# Patient Record
Sex: FEMALE | Race: WHITE | NOT HISPANIC OR LATINO | Employment: OTHER | URBAN - NONMETROPOLITAN AREA
[De-identification: names, ages, dates, MRNs, and addresses within clinical notes are randomized per-mention and may not be internally consistent; named-entity substitution may affect disease eponyms.]

---

## 2017-04-12 ENCOUNTER — HOSPITAL ENCOUNTER (INPATIENT)
Facility: HOSPITAL | Age: 57
LOS: 8 days | Discharge: HOME HEALTH | End: 2017-04-20
Attending: EMERGENCY MEDICINE | Admitting: FAMILY MEDICINE

## 2017-04-12 ENCOUNTER — APPOINTMENT (OUTPATIENT)
Dept: GENERAL RADIOLOGY | Facility: HOSPITAL | Age: 57
End: 2017-04-12

## 2017-04-12 DIAGNOSIS — L03.116 CELLULITIS OF FOOT, LEFT: Primary | ICD-10-CM

## 2017-04-12 LAB
ALBUMIN SERPL-MCNC: 4 G/DL (ref 3.5–5)
ALBUMIN/GLOB SERPL: 1.3 G/DL (ref 1.5–2.5)
ALP SERPL-CCNC: 92 U/L (ref 35–104)
ALT SERPL W P-5'-P-CCNC: 22 U/L (ref 10–36)
ANION GAP SERPL CALCULATED.3IONS-SCNC: 4.1 MMOL/L (ref 3.6–11.2)
APTT PPP: 30.8 SECONDS (ref 24.4–31)
AST SERPL-CCNC: 20 U/L (ref 10–30)
BASOPHILS # BLD AUTO: 0.02 10*3/MM3 (ref 0–0.3)
BASOPHILS NFR BLD AUTO: 0.2 % (ref 0–2)
BILIRUB SERPL-MCNC: 0.6 MG/DL (ref 0.2–1.8)
BILIRUB UR QL STRIP: NEGATIVE
BUN BLD-MCNC: 20 MG/DL (ref 7–21)
BUN/CREAT SERPL: 11.2 (ref 7–25)
CALCIUM SPEC-SCNC: 8.8 MG/DL (ref 7.7–10)
CHLORIDE SERPL-SCNC: 99 MMOL/L (ref 99–112)
CLARITY UR: CLEAR
CO2 SERPL-SCNC: 29.9 MMOL/L (ref 24.3–31.9)
COLOR UR: YELLOW
CREAT BLD-MCNC: 1.78 MG/DL (ref 0.43–1.29)
D-LACTATE SERPL-SCNC: 0.8 MMOL/L (ref 0.5–2)
DEPRECATED RDW RBC AUTO: 44.9 FL (ref 37–54)
EOSINOPHIL # BLD AUTO: 0.07 10*3/MM3 (ref 0–0.7)
EOSINOPHIL NFR BLD AUTO: 0.9 % (ref 0–5)
ERYTHROCYTE [DISTWIDTH] IN BLOOD BY AUTOMATED COUNT: 13.3 % (ref 11.5–14.5)
GFR SERPL CREATININE-BSD FRML MDRD: 29 ML/MIN/1.73
GLOBULIN UR ELPH-MCNC: 3 GM/DL
GLUCOSE BLD-MCNC: 406 MG/DL (ref 70–110)
GLUCOSE BLDC GLUCOMTR-MCNC: 393 MG/DL (ref 70–130)
GLUCOSE UR STRIP-MCNC: ABNORMAL MG/DL
HCT VFR BLD AUTO: 32.6 % (ref 37–47)
HGB BLD-MCNC: 10.5 G/DL (ref 12–16)
HGB UR QL STRIP.AUTO: NEGATIVE
IMM GRANULOCYTES # BLD: 0.03 10*3/MM3 (ref 0–0.03)
IMM GRANULOCYTES NFR BLD: 0.4 % (ref 0–0.5)
INR PPP: 0.91 (ref 0.8–1.1)
KETONES UR QL STRIP: NEGATIVE
LEUKOCYTE ESTERASE UR QL STRIP.AUTO: NEGATIVE
LYMPHOCYTES # BLD AUTO: 1.43 10*3/MM3 (ref 1–3)
LYMPHOCYTES NFR BLD AUTO: 17.4 % (ref 21–51)
MCH RBC QN AUTO: 29.7 PG (ref 27–33)
MCHC RBC AUTO-ENTMCNC: 32.2 G/DL (ref 33–37)
MCV RBC AUTO: 92.4 FL (ref 80–94)
MONOCYTES # BLD AUTO: 0.83 10*3/MM3 (ref 0.1–0.9)
MONOCYTES NFR BLD AUTO: 10.1 % (ref 0–10)
NEUTROPHILS # BLD AUTO: 5.84 10*3/MM3 (ref 1.4–6.5)
NEUTROPHILS NFR BLD AUTO: 71 % (ref 30–70)
NITRITE UR QL STRIP: NEGATIVE
OSMOLALITY SERPL CALC.SUM OF ELEC: 286.1 MOSM/KG (ref 273–305)
PH UR STRIP.AUTO: 5.5 [PH] (ref 5–8)
PLATELET # BLD AUTO: 126 10*3/MM3 (ref 130–400)
PMV BLD AUTO: 11.1 FL (ref 6–10)
POTASSIUM BLD-SCNC: 4 MMOL/L (ref 3.5–5.3)
PROT SERPL-MCNC: 7 G/DL (ref 6–8)
PROT UR QL STRIP: NEGATIVE
PROTHROMBIN TIME: 10 SECONDS (ref 9.8–11.9)
RBC # BLD AUTO: 3.53 10*6/MM3 (ref 4.2–5.4)
SODIUM BLD-SCNC: 133 MMOL/L (ref 135–153)
SP GR UR STRIP: 1.01 (ref 1–1.03)
UROBILINOGEN UR QL STRIP: ABNORMAL
WBC NRBC COR # BLD: 8.22 10*3/MM3 (ref 4.5–12.5)

## 2017-04-12 PROCEDURE — 87070 CULTURE OTHR SPECIMN AEROBIC: CPT | Performed by: EMERGENCY MEDICINE

## 2017-04-12 PROCEDURE — 87077 CULTURE AEROBIC IDENTIFY: CPT | Performed by: EMERGENCY MEDICINE

## 2017-04-12 PROCEDURE — 87040 BLOOD CULTURE FOR BACTERIA: CPT | Performed by: EMERGENCY MEDICINE

## 2017-04-12 PROCEDURE — 85610 PROTHROMBIN TIME: CPT | Performed by: EMERGENCY MEDICINE

## 2017-04-12 PROCEDURE — 25010000002 VANCOMYCIN PER 500 MG: Performed by: EMERGENCY MEDICINE

## 2017-04-12 PROCEDURE — 73620 X-RAY EXAM OF FOOT: CPT

## 2017-04-12 PROCEDURE — 85025 COMPLETE CBC W/AUTO DIFF WBC: CPT | Performed by: EMERGENCY MEDICINE

## 2017-04-12 PROCEDURE — 83605 ASSAY OF LACTIC ACID: CPT | Performed by: EMERGENCY MEDICINE

## 2017-04-12 PROCEDURE — 99284 EMERGENCY DEPT VISIT MOD MDM: CPT

## 2017-04-12 PROCEDURE — 87205 SMEAR GRAM STAIN: CPT | Performed by: EMERGENCY MEDICINE

## 2017-04-12 PROCEDURE — 36415 COLL VENOUS BLD VENIPUNCTURE: CPT

## 2017-04-12 PROCEDURE — 63710000001 INSULIN REGULAR HUMAN PER 5 UNITS: Performed by: EMERGENCY MEDICINE

## 2017-04-12 PROCEDURE — 81003 URINALYSIS AUTO W/O SCOPE: CPT | Performed by: EMERGENCY MEDICINE

## 2017-04-12 PROCEDURE — 85730 THROMBOPLASTIN TIME PARTIAL: CPT | Performed by: EMERGENCY MEDICINE

## 2017-04-12 PROCEDURE — 80053 COMPREHEN METABOLIC PANEL: CPT | Performed by: EMERGENCY MEDICINE

## 2017-04-12 PROCEDURE — 87186 SC STD MICRODIL/AGAR DIL: CPT | Performed by: EMERGENCY MEDICINE

## 2017-04-12 PROCEDURE — 82962 GLUCOSE BLOOD TEST: CPT

## 2017-04-12 PROCEDURE — 87147 CULTURE TYPE IMMUNOLOGIC: CPT | Performed by: EMERGENCY MEDICINE

## 2017-04-12 PROCEDURE — 73630 X-RAY EXAM OF FOOT: CPT | Performed by: RADIOLOGY

## 2017-04-12 RX ORDER — LIDOCAINE HYDROCHLORIDE 10 MG/ML
10 INJECTION, SOLUTION EPIDURAL; INFILTRATION; INTRACAUDAL; PERINEURAL ONCE
Status: DISCONTINUED | OUTPATIENT
Start: 2017-04-12 | End: 2017-04-20 | Stop reason: HOSPADM

## 2017-04-12 RX ORDER — HYDROXYZINE HYDROCHLORIDE 10 MG/1
10 TABLET, FILM COATED ORAL DAILY
Status: ON HOLD | COMMUNITY
End: 2018-11-23

## 2017-04-12 RX ORDER — MELOXICAM 7.5 MG/1
7.5 TABLET ORAL DAILY
Status: ON HOLD | COMMUNITY
End: 2017-04-13

## 2017-04-12 RX ORDER — LIDOCAINE HYDROCHLORIDE 10 MG/ML
INJECTION, SOLUTION INFILTRATION; PERINEURAL
Status: DISPENSED
Start: 2017-04-12 | End: 2017-04-13

## 2017-04-12 RX ORDER — CLINDAMYCIN PHOSPHATE 900 MG/50ML
900 INJECTION INTRAVENOUS ONCE
Status: COMPLETED | OUTPATIENT
Start: 2017-04-12 | End: 2017-04-12

## 2017-04-12 RX ORDER — TIZANIDINE 4 MG/1
4 TABLET ORAL EVERY 6 HOURS PRN
COMMUNITY
End: 2018-11-25 | Stop reason: HOSPADM

## 2017-04-12 RX ORDER — CETIRIZINE HYDROCHLORIDE 5 MG/1
5 TABLET ORAL DAILY
Status: ON HOLD | COMMUNITY
End: 2017-04-13

## 2017-04-12 RX ORDER — FLUOXETINE HYDROCHLORIDE 20 MG/1
20 CAPSULE ORAL DAILY
COMMUNITY
End: 2018-11-25 | Stop reason: HOSPADM

## 2017-04-12 RX ORDER — TEMAZEPAM 30 MG/1
30 CAPSULE ORAL NIGHTLY
Status: ON HOLD | COMMUNITY
End: 2021-11-05

## 2017-04-12 RX ORDER — INSULIN GLARGINE 100 [IU]/ML
22 INJECTION, SOLUTION SUBCUTANEOUS NIGHTLY
Status: ON HOLD | COMMUNITY
End: 2017-05-25

## 2017-04-12 RX ORDER — ATENOLOL 50 MG/1
40 TABLET ORAL DAILY
Status: ON HOLD | COMMUNITY
End: 2017-04-13

## 2017-04-12 RX ADMIN — CLINDAMYCIN PHOSPHATE 900 MG: 18 INJECTION, SOLUTION INTRAVENOUS at 21:05

## 2017-04-12 RX ADMIN — SODIUM CHLORIDE 1000 ML: 9 INJECTION, SOLUTION INTRAVENOUS at 21:05

## 2017-04-12 RX ADMIN — VANCOMYCIN HYDROCHLORIDE 1000 MG: 5 INJECTION, POWDER, LYOPHILIZED, FOR SOLUTION INTRAVENOUS at 21:27

## 2017-04-12 RX ADMIN — HUMAN INSULIN 12 UNITS: 100 INJECTION, SOLUTION SUBCUTANEOUS at 22:18

## 2017-04-13 LAB
GLUCOSE BLDC GLUCOMTR-MCNC: 233 MG/DL (ref 70–130)
GLUCOSE BLDC GLUCOMTR-MCNC: 298 MG/DL (ref 70–130)
GLUCOSE BLDC GLUCOMTR-MCNC: 310 MG/DL (ref 70–130)

## 2017-04-13 PROCEDURE — 25010000002 VANCOMYCIN PER 500 MG: Performed by: FAMILY MEDICINE

## 2017-04-13 PROCEDURE — 25010000002 MORPHINE PER 10 MG: Performed by: FAMILY MEDICINE

## 2017-04-13 PROCEDURE — 82962 GLUCOSE BLOOD TEST: CPT

## 2017-04-13 PROCEDURE — 25010000002 CEFEPIME: Performed by: FAMILY MEDICINE

## 2017-04-13 PROCEDURE — 63710000001 INSULIN ASPART PER 5 UNITS: Performed by: FAMILY MEDICINE

## 2017-04-13 PROCEDURE — 63710000001 INSULIN DETEMIR PER 5 UNITS: Performed by: FAMILY MEDICINE

## 2017-04-13 RX ORDER — GABAPENTIN 400 MG/1
800 CAPSULE ORAL 3 TIMES DAILY
Status: CANCELLED | OUTPATIENT
Start: 2017-04-13

## 2017-04-13 RX ORDER — CETIRIZINE HYDROCHLORIDE 10 MG/1
10 TABLET ORAL DAILY
Status: CANCELLED | OUTPATIENT
Start: 2017-04-13

## 2017-04-13 RX ORDER — GABAPENTIN 800 MG/1
800 TABLET ORAL 3 TIMES DAILY
Status: ON HOLD | COMMUNITY
End: 2018-11-25 | Stop reason: SDUPTHER

## 2017-04-13 RX ORDER — HYDROXYZINE HYDROCHLORIDE 10 MG/1
10 TABLET, FILM COATED ORAL DAILY
Status: CANCELLED | OUTPATIENT
Start: 2017-04-13

## 2017-04-13 RX ORDER — HYDROCODONE BITARTRATE AND ACETAMINOPHEN 10; 325 MG/1; MG/1
1 TABLET ORAL EVERY 8 HOURS SCHEDULED
Status: DISCONTINUED | OUTPATIENT
Start: 2017-04-13 | End: 2017-04-20 | Stop reason: HOSPADM

## 2017-04-13 RX ORDER — HYDROCODONE BITARTRATE AND ACETAMINOPHEN 10; 325 MG/1; MG/1
1 TABLET ORAL 3 TIMES DAILY
Status: CANCELLED | OUTPATIENT
Start: 2017-04-13

## 2017-04-13 RX ORDER — FLUOXETINE HYDROCHLORIDE 20 MG/1
20 CAPSULE ORAL DAILY
Status: DISCONTINUED | OUTPATIENT
Start: 2017-04-13 | End: 2017-04-20 | Stop reason: HOSPADM

## 2017-04-13 RX ORDER — CETIRIZINE HYDROCHLORIDE 10 MG/1
10 TABLET ORAL DAILY
Status: ON HOLD | COMMUNITY
End: 2018-11-23

## 2017-04-13 RX ORDER — TIZANIDINE 4 MG/1
4 TABLET ORAL EVERY 6 HOURS SCHEDULED
Status: DISCONTINUED | OUTPATIENT
Start: 2017-04-13 | End: 2017-04-20 | Stop reason: HOSPADM

## 2017-04-13 RX ORDER — GABAPENTIN 400 MG/1
800 CAPSULE ORAL EVERY 8 HOURS SCHEDULED
Status: DISCONTINUED | OUTPATIENT
Start: 2017-04-13 | End: 2017-04-20 | Stop reason: HOSPADM

## 2017-04-13 RX ORDER — HYDROCODONE BITARTRATE AND ACETAMINOPHEN 10; 325 MG/1; MG/1
1 TABLET ORAL EVERY 6 HOURS PRN
Status: ON HOLD | COMMUNITY
End: 2018-11-25 | Stop reason: SDUPTHER

## 2017-04-13 RX ORDER — ATENOLOL 25 MG/1
50 TABLET ORAL 2 TIMES DAILY
Status: ON HOLD | COMMUNITY
End: 2017-05-25

## 2017-04-13 RX ORDER — NICOTINE POLACRILEX 4 MG
15 LOZENGE BUCCAL
Status: DISCONTINUED | OUTPATIENT
Start: 2017-04-13 | End: 2017-04-20 | Stop reason: HOSPADM

## 2017-04-13 RX ORDER — ATENOLOL 25 MG/1
25 TABLET ORAL DAILY
Status: CANCELLED | OUTPATIENT
Start: 2017-04-13

## 2017-04-13 RX ORDER — SODIUM CHLORIDE 9 MG/ML
75 INJECTION, SOLUTION INTRAVENOUS CONTINUOUS
Status: DISCONTINUED | OUTPATIENT
Start: 2017-04-13 | End: 2017-04-20 | Stop reason: HOSPADM

## 2017-04-13 RX ORDER — MORPHINE SULFATE 2 MG/ML
2 INJECTION, SOLUTION INTRAMUSCULAR; INTRAVENOUS
Status: DISCONTINUED | OUTPATIENT
Start: 2017-04-13 | End: 2017-04-20 | Stop reason: HOSPADM

## 2017-04-13 RX ORDER — TIZANIDINE 4 MG/1
4 TABLET ORAL 4 TIMES DAILY
Status: CANCELLED | OUTPATIENT
Start: 2017-04-13

## 2017-04-13 RX ORDER — ACETAMINOPHEN 325 MG/1
500 TABLET ORAL EVERY 4 HOURS PRN
Status: DISCONTINUED | OUTPATIENT
Start: 2017-04-13 | End: 2017-04-20 | Stop reason: HOSPADM

## 2017-04-13 RX ORDER — DEXTROSE MONOHYDRATE 25 G/50ML
25 INJECTION, SOLUTION INTRAVENOUS
Status: DISCONTINUED | OUTPATIENT
Start: 2017-04-13 | End: 2017-04-20 | Stop reason: HOSPADM

## 2017-04-13 RX ORDER — TEMAZEPAM 15 MG/1
30 CAPSULE ORAL NIGHTLY PRN
Status: DISCONTINUED | OUTPATIENT
Start: 2017-04-13 | End: 2017-04-20 | Stop reason: HOSPADM

## 2017-04-13 RX ORDER — CETIRIZINE HYDROCHLORIDE 10 MG/1
10 TABLET ORAL DAILY
Status: DISCONTINUED | OUTPATIENT
Start: 2017-04-13 | End: 2017-04-20 | Stop reason: HOSPADM

## 2017-04-13 RX ORDER — ATENOLOL 25 MG/1
25 TABLET ORAL DAILY
Status: DISCONTINUED | OUTPATIENT
Start: 2017-04-13 | End: 2017-04-20 | Stop reason: HOSPADM

## 2017-04-13 RX ORDER — FLUOXETINE HYDROCHLORIDE 20 MG/1
20 CAPSULE ORAL DAILY
Status: CANCELLED | OUTPATIENT
Start: 2017-04-13

## 2017-04-13 RX ORDER — HYDROXYZINE HYDROCHLORIDE 10 MG/1
10 TABLET, FILM COATED ORAL DAILY
Status: DISCONTINUED | OUTPATIENT
Start: 2017-04-13 | End: 2017-04-20 | Stop reason: HOSPADM

## 2017-04-13 RX ADMIN — ACETAMINOPHEN 487.5 MG: 325 TABLET, FILM COATED ORAL at 08:49

## 2017-04-13 RX ADMIN — INSULIN ASPART 7 UNITS: 100 INJECTION, SOLUTION INTRAVENOUS; SUBCUTANEOUS at 17:38

## 2017-04-13 RX ADMIN — HYDROCODONE BITARTRATE AND ACETAMINOPHEN 1 TABLET: 10; 325 TABLET ORAL at 20:51

## 2017-04-13 RX ADMIN — METRONIDAZOLE 500 MG: 500 INJECTION, SOLUTION INTRAVENOUS at 18:20

## 2017-04-13 RX ADMIN — CETIRIZINE HYDROCHLORIDE 10 MG: 10 TABLET ORAL at 12:10

## 2017-04-13 RX ADMIN — CEFEPIME 2 G: 2 INJECTION, POWDER, FOR SOLUTION INTRAVENOUS at 15:45

## 2017-04-13 RX ADMIN — VANCOMYCIN HYDROCHLORIDE 1000 MG: 5 INJECTION, POWDER, LYOPHILIZED, FOR SOLUTION INTRAVENOUS at 20:52

## 2017-04-13 RX ADMIN — SODIUM CHLORIDE 75 ML/HR: 9 INJECTION, SOLUTION INTRAVENOUS at 15:45

## 2017-04-13 RX ADMIN — MORPHINE SULFATE 2 MG: 2 INJECTION, SOLUTION INTRAMUSCULAR; INTRAVENOUS at 04:27

## 2017-04-13 RX ADMIN — TIZANIDINE 4 MG: 4 TABLET ORAL at 17:38

## 2017-04-13 RX ADMIN — ATENOLOL 25 MG: 25 TABLET ORAL at 12:10

## 2017-04-13 RX ADMIN — CEFEPIME 2 G: 2 INJECTION, POWDER, FOR SOLUTION INTRAVENOUS at 23:48

## 2017-04-13 RX ADMIN — SODIUM CHLORIDE 75 ML/HR: 9 INJECTION, SOLUTION INTRAVENOUS at 00:30

## 2017-04-13 RX ADMIN — MORPHINE SULFATE 2 MG: 2 INJECTION, SOLUTION INTRAMUSCULAR; INTRAVENOUS at 14:04

## 2017-04-13 RX ADMIN — MORPHINE SULFATE 2 MG: 2 INJECTION, SOLUTION INTRAMUSCULAR; INTRAVENOUS at 11:05

## 2017-04-13 RX ADMIN — HYDROCODONE BITARTRATE AND ACETAMINOPHEN 1 TABLET: 10; 325 TABLET ORAL at 12:10

## 2017-04-13 RX ADMIN — MORPHINE SULFATE 2 MG: 2 INJECTION, SOLUTION INTRAMUSCULAR; INTRAVENOUS at 08:53

## 2017-04-13 RX ADMIN — INSULIN DETEMIR 22 UNITS: 100 INJECTION, SOLUTION SUBCUTANEOUS at 20:53

## 2017-04-13 RX ADMIN — FLUOXETINE 20 MG: 20 CAPSULE ORAL at 12:10

## 2017-04-13 RX ADMIN — INSULIN ASPART 6 UNITS: 100 INJECTION, SOLUTION INTRAVENOUS; SUBCUTANEOUS at 20:51

## 2017-04-13 RX ADMIN — TIZANIDINE 4 MG: 4 TABLET ORAL at 12:10

## 2017-04-13 RX ADMIN — CEFEPIME 2 G: 2 INJECTION, POWDER, FOR SOLUTION INTRAVENOUS at 08:48

## 2017-04-13 RX ADMIN — TIZANIDINE 4 MG: 4 TABLET ORAL at 23:48

## 2017-04-13 RX ADMIN — MORPHINE SULFATE 2 MG: 2 INJECTION, SOLUTION INTRAMUSCULAR; INTRAVENOUS at 00:29

## 2017-04-13 RX ADMIN — GABAPENTIN 800 MG: 400 CAPSULE ORAL at 12:10

## 2017-04-13 RX ADMIN — METRONIDAZOLE 500 MG: 500 INJECTION, SOLUTION INTRAVENOUS at 12:00

## 2017-04-13 RX ADMIN — TEMAZEPAM 30 MG: 15 CAPSULE ORAL at 20:51

## 2017-04-13 RX ADMIN — GABAPENTIN 800 MG: 400 CAPSULE ORAL at 20:52

## 2017-04-13 RX ADMIN — INSULIN ASPART 4 UNITS: 100 INJECTION, SOLUTION INTRAVENOUS; SUBCUTANEOUS at 12:11

## 2017-04-13 RX ADMIN — HYDROXYZINE HYDROCHLORIDE 10 MG: 10 TABLET ORAL at 12:10

## 2017-04-13 NOTE — ED NOTES
IV medications and fluids completed. IV site to left forearm patent. Report called to Vicky CASTRO on [address].      Frank Francois RN  04/12/17 0457

## 2017-04-13 NOTE — PLAN OF CARE
Problem: Patient Care Overview (Adult)  Goal: Plan of Care Review  Outcome: Ongoing (interventions implemented as appropriate)  Goal: Adult Individualization and Mutuality  Outcome: Ongoing (interventions implemented as appropriate)  Goal: Discharge Needs Assessment  Outcome: Ongoing (interventions implemented as appropriate)    Problem: Cellulitis (Adult)  Goal: Signs and Symptoms of Listed Potential Problems Will be Absent or Manageable (Cellulitis)  Outcome: Ongoing (interventions implemented as appropriate)    Problem: Pressure Ulcer Risk (Helio Scale) (Adult,Obstetrics,Pediatric)  Goal: Identify Related Risk Factors and Signs and Symptoms  Outcome: Ongoing (interventions implemented as appropriate)  Goal: Skin Integrity  Outcome: Ongoing (interventions implemented as appropriate)    04/13/17 1527   Pressure Ulcer Risk (Helio Scale) (Adult,Obstetrics,Pediatric)   Skin Integrity making progress toward outcome

## 2017-04-13 NOTE — PROGRESS NOTES
Discharge Planning Assessment   Jose Alfredo     Patient Name: Reny Elena  MRN: [id number]  Today's Date: 4/13/2017    Admit Date: 4/12/2017          Discharge Needs Assessment       04/13/17 1445    Living Environment    Lives With alone    Living Arrangements house    Quality Of Family Relationships --   States she has family that assist her as needed.    Able to Return to Prior Living Arrangements yes    Discharge Needs Assessment    Concerns To Be Addressed no discharge needs identified    Readmission Within The Last 30 Days no previous admission in last 30 days    Outpatient/Agency/Support Group Needs --   Patient may need home health or DME at discharge. Will follow.    Community Agency Name(S) Previously has used [facility].    Equipment Currently Used at Home none    Equipment Needed After Discharge none    Transportation Available car   She does not drive. Family provides all transportation.    Discharge Disposition still a patient            Discharge Plan       04/13/17 1447    Case Management/Social Work Plan    Plan Plans to return home at discharge with good family support if needed.    Patient/Family In Agreement With Plan yes    Additional Comments Patient came to ED with c/o wound in her left toe area. I/D in ED with Cx taken. Receiving IV ATB's. Wnd has a drsg over area. ID consulted. Home Health or DME can be arranged with order. CM will follow and assist as needed.        Discharge Placement     No information found                Demographic Summary       04/13/17 1442    Referral Information    Admission Type inpatient    Arrived From home or self-care    Referral Source admission list    Reason For Consult discharge planning    Record Reviewed medical record    Primary Care Physician Information    Name Dr. Paulson-PCP.            Functional Status       04/13/17 1443    Functional Status Current    Communication 0-->understands/communicates without difficulty    Swallowing (if  score 2 or more for any item, consult Rehab Services) 0-->swallows foods/liquids without difficulty    Current Functional Level Comment States she is independent with ADL's.    Change in Functional Status Since Onset of Current Illness/Injury yes    Functional Status Prior    Communication 0-->understands/communicates without difficulty    Swallowing 0-->swallows foods/liquids without difficulty    Prior Functional Level Comment Previously she was independent with ADL's.    IADL    IADL Comments Indpendent with IADL's.    Activity Tolerance    Current Activity Limitations --   Per MD orders.    Usual Activity Tolerance excellent    Current Activity Tolerance moderate   She has a wound on left foot toes.    Cognitive/Perceptual/Developmental    Current Mental Status/Cognitive Functioning no deficits noted   Alert and oriented.    Recent Changes in Mental Status/Cognitive Functioning no changes    Employment/Financial    Financial Concerns none   She has [employer] coverage. She does not have medication copay or financial issues.             Psychosocial     None. Pleasant. Alert and oriented.            Abuse/Neglect     None            Legal       04/13/17 8188    Legal    Assistance with Managing/Advocating Healthcare Needs --   She does not have a POA or AD. Declines info.            Substance Abuse     None            Patient Forms     None          Erika Piper RN

## 2017-04-13 NOTE — ED PROVIDER NOTES
Subjective   Patient is a 57 y.o. female presenting with lower extremity pain.   Lower Extremity Issue   Location:  Foot  Time since incident:  8 hours  Injury: no    Foot location:  L foot  Pain details:     Quality:  Unable to specify (Patient has diabetes and neuropathy she now has a cellulitis her left foot and she doesn't feel much)    Radiates to:  Does not radiate    Severity:  Severe    Onset quality:  Sudden    Progression:  Worsening (This diabetic patient has a chronic low-grade infection of her left foot and her toes judgment fine until this morning the  went to change and surgery would became very red and swollen and with a large subcutaneous effusion between the great toe and)  Chronicity:  Recurrent  Worsened by:  Nothing  Associated symptoms: fever (patient states that she's had a fever since chest today she said it was above 204 today)        Review of Systems   Constitutional: Positive for fever (patient states that she's had a fever since chest today she said it was above 204 today). Negative for activity change.   HENT: Negative.  Negative for trouble swallowing.    Eyes: Negative for discharge.   Respiratory: Negative for cough, shortness of breath, wheezing and stridor.    Cardiovascular: Negative for chest pain.   Gastrointestinal: Negative for abdominal pain, diarrhea, nausea and vomiting.   Genitourinary: Negative for difficulty urinating, dysuria and flank pain.   Musculoskeletal: Negative for arthralgias and myalgias.        Her left foot has swelling and erythema around the web of the first and second toe there is a blister with fluid-filled area.  There is a red streak up the left leg   Skin: Negative for rash and wound.   Neurological: Negative for dizziness.   Psychiatric/Behavioral: Negative for agitation.   All other systems reviewed and are negative.      Past Medical History:   Diagnosis Date   • Arthritis    • Depression    • Diabetes mellitus    • Hypertension         Allergies   Allergen Reactions   • Codeine    • Penicillins    • Sulfa Antibiotics        Past Surgical History:   Procedure Laterality Date   • BACK SURGERY     •  SECTION     • DENTAL PROCEDURE     • HYSTERECTOMY     • JOINT REPLACEMENT     • TOE AMPUTATION     • TUBAL ABDOMINAL LIGATION         History reviewed. No pertinent family history.    Social History     Social History   • Marital status: Single     Spouse name: N/A   • Number of children: N/A   • Years of education: N/A     Social History Main Topics   • Smoking status: Never Smoker   • Smokeless tobacco: None   • Alcohol use No   • Drug use: No   • Sexual activity: Defer     Other Topics Concern   • None     Social History Narrative   • None           Objective   Physical Exam   Constitutional: She is oriented to person, place, and time. She appears well-developed and well-nourished.   HENT:   Head: Normocephalic.   Right Ear: External ear normal.   Left Ear: External ear normal.   Nose: Nose normal.   Mouth/Throat: Oropharynx is clear and moist.   Eyes: EOM are normal. Pupils are equal, round, and reactive to light.   Neck: Normal range of motion. Neck supple. No thyromegaly present.   Cardiovascular: Normal rate, regular rhythm, normal heart sounds and intact distal pulses.    Pulmonary/Chest: Effort normal and breath sounds normal. No respiratory distress. She has no wheezes. She has no rales.   Abdominal: Soft. She exhibits no distension. There is no tenderness. There is no rebound and no guarding.   Musculoskeletal: Normal range of motion. She exhibits no edema or tenderness.   Eft foot has erythema and swelling between the web of the first second digit of the left foot that's not acutely tender but she has neuropathy.  There is a large area of Listerine with fluid blister on the underside of the toe on the first digit.  There is a red streak going up the left leg retirement up the mid tibia   Neurological: She is alert and oriented to  person, place, and time. She has normal reflexes. No cranial nerve deficit.   Skin: Skin is warm and dry. No rash noted.   Psychiatric: She has a normal mood and affect. Her behavior is normal. Judgment and thought content normal.   Nursing note and vitals reviewed.      Incision & Drainage  Date/Time: 4/12/2017 10:03 PM  Performed by: CARLOS PIPER  Authorized by: CARLOS PIPER     Consent:     Consent obtained:  Verbal    Consent given by:  Patient    Risks discussed:  Bleeding and infection    Alternatives discussed:  No treatment  Location:     Indications for incision and drainage: Large blister was debridable between the first and second toe well.  Pre-procedure details:     Skin preparation:  Betadine  Anesthesia (see MAR for exact dosages):     Anesthesia method:  Local infiltration    Local anesthetic:  Lidocaine 1% w/o epi  Procedure details:     Complexity:  Simple    Needle aspiration: no      Incision types:  Single straight    Incision depth:  Dermal    Scalpel blade:  11    Wound management:  Irrigated with saline and probed and deloculated    Drainage:  Serous    Drainage amount:  Moderate    Wound treatment:  Wound left open    Packing materials:  None  Post-procedure details:     Patient tolerance of procedure:  Tolerated well, no immediate complications             ED Course  ED Course   Comment By Time   I did an I&D on this small little stronger and effusion between the web of the first and second toe was quite a bit of fluid there but was clear sent for culture.  I spoke to Dr. ALEJANDRA Ahuja.  He will admit the patient for Dr. Lorene Piper MD 04/12 2202                  MDM  Number of Diagnoses or Management Options  Cellulitis of foot, left:   Patient Progress  Patient progress: improved      Final diagnoses:   Cellulitis of foot, left            Carlos Piper MD  04/12/17 2211

## 2017-04-13 NOTE — PAYOR COMM NOTE
"Contact: Syl Stephens RN @ [facility]  Phone: [phone]  Fax: [phone]    Inpatient status  Clinical             Reny Merino (57 y.o. Female)     Date of Birth Social Security Number Address Home Phone MRN    1960  [id number] [place] [address] [phone] [id number]    Druze Marital Status          None Single       Admission Date Admission Type Admitting Provider Attending Provider Department, Room/Bed    4/12/17 Emergency Yandel Paulson MD Perry, Kelvin D, MD [address], 3351/1S    Discharge Date Discharge Disposition Discharge Destination                      Attending Provider: Alonso Ahuja MD     Allergies:  Codeine, Penicillins, Sulfa Antibiotics    Isolation:  None   Infection:  None   Code Status:  FULL    Ht:  66\" (167.6 cm)   Wt:  157 lb 9 oz (71.5 kg)    Admission Cmt:  None   Principal Problem:  None                Active Insurance as of 4/12/2017     Primary Coverage     Payor Plan Insurance Group Employer/Plan Group    [employer] [place] [employer]      Payor Plan Address Payor Plan Phone Number Effective From Effective To    [address] [address] [phone] 4/12/2017     [place] [id number]       Subscriber Name Subscriber Birth Date Member ID       RENY MERINO 1960 [id number]                 Emergency Contacts      (Rel.) Home Phone Work Phone Mobile Phone    Liza Oliva (Daughter) [phone] -- --            History & Physical     No notes of this type exist for this encounter.           Emergency Department Notes      Carlos Piper MD at 4/12/2017  8:22 PM      Procedure Orders:    1. Incision & Drainage [[id number]] ordered by Carlos Piper MD at 04/12/17 2203                Subjective   Patient is a 57 y.o. female presenting with lower extremity pain.   Lower Extremity Issue   Location:  Foot  Time since incident:  8 hours  Injury: no    Foot location:  L foot  Pain details:     Quality:  Unable to " specify (Patient has diabetes and neuropathy she now has a cellulitis her left foot and she doesn't feel much)    Radiates to:  Does not radiate    Severity:  Severe    Onset quality:  Sudden    Progression:  Worsening (This diabetic patient has a chronic low-grade infection of her left foot and her toes judgment fine until this morning the  went to change and surgery would became very red and swollen and with a large subcutaneous effusion between the great toe and)  Chronicity:  Recurrent  Worsened by:  Nothing  Associated symptoms: fever (patient states that she's had a fever since chest today she said it was above 204 today)        Review of Systems   Constitutional: Positive for fever (patient states that she's had a fever since chest today she said it was above 204 today). Negative for activity change.   HENT: Negative.  Negative for trouble swallowing.    Eyes: Negative for discharge.   Respiratory: Negative for cough, shortness of breath, wheezing and stridor.    Cardiovascular: Negative for chest pain.   Gastrointestinal: Negative for abdominal pain, diarrhea, nausea and vomiting.   Genitourinary: Negative for difficulty urinating, dysuria and flank pain.   Musculoskeletal: Negative for arthralgias and myalgias.        Her left foot has swelling and erythema around the web of the first and second toe there is a blister with fluid-filled area.  There is a red streak up the left leg   Skin: Negative for rash and wound.   Neurological: Negative for dizziness.   Psychiatric/Behavioral: Negative for agitation.   All other systems reviewed and are negative.      Past Medical History:   Diagnosis Date   • Arthritis    • Depression    • Diabetes mellitus    • Hypertension        Allergies   Allergen Reactions   • Codeine    • Penicillins    • Sulfa Antibiotics        Past Surgical History:   Procedure Laterality Date   • BACK SURGERY     •  SECTION     • DENTAL PROCEDURE     • HYSTERECTOMY     • JOINT  REPLACEMENT     • TOE AMPUTATION     • TUBAL ABDOMINAL LIGATION         History reviewed. No pertinent family history.    Social History     Social History   • Marital status: Single     Spouse name: N/A   • Number of children: N/A   • Years of education: N/A     Social History Main Topics   • Smoking status: Never Smoker   • Smokeless tobacco: None   • Alcohol use No   • Drug use: No   • Sexual activity: Defer     Other Topics Concern   • None     Social History Narrative   • None           Objective   Physical Exam   Constitutional: She is oriented to person, place, and time. She appears well-developed and well-nourished.   HENT:   Head: Normocephalic.   Right Ear: External ear normal.   Left Ear: External ear normal.   Nose: Nose normal.   Mouth/Throat: Oropharynx is clear and moist.   Eyes: EOM are normal. Pupils are equal, round, and reactive to light.   Neck: Normal range of motion. Neck supple. No thyromegaly present.   Cardiovascular: Normal rate, regular rhythm, normal heart sounds and intact distal pulses.    Pulmonary/Chest: Effort normal and breath sounds normal. No respiratory distress. She has no wheezes. She has no rales.   Abdominal: Soft. She exhibits no distension. There is no tenderness. There is no rebound and no guarding.   Musculoskeletal: Normal range of motion. She exhibits no edema or tenderness.   Eft foot has erythema and swelling between the web of the first second digit of the left foot that's not acutely tender but she has neuropathy.  There is a large area of Listerine with fluid blister on the underside of the toe on the first digit.  There is a red streak going up the left leg retirement up the mid tibia   Neurological: She is alert and oriented to person, place, and time. She has normal reflexes. No cranial nerve deficit.   Skin: Skin is warm and dry. No rash noted.   Psychiatric: She has a normal mood and affect. Her behavior is normal. Judgment and thought content normal.   Nursing  note and vitals reviewed.      Incision & Drainage  Date/Time: 4/12/2017 10:03 PM  Performed by: TETO SOSA  Authorized by: TETO SOSA     Consent:     Consent obtained:  Verbal    Consent given by:  Patient    Risks discussed:  Bleeding and infection    Alternatives discussed:  No treatment  Location:     Indications for incision and drainage: Large blister was debridable between the first and second toe well.  Pre-procedure details:     Skin preparation:  Betadine  Anesthesia (see MAR for exact dosages):     Anesthesia method:  Local infiltration    Local anesthetic:  Lidocaine 1% w/o epi  Procedure details:     Complexity:  Simple    Needle aspiration: no      Incision types:  Single straight    Incision depth:  Dermal    Scalpel blade:  11    Wound management:  Irrigated with saline and probed and deloculated    Drainage:  Serous    Drainage amount:  Moderate    Wound treatment:  Wound left open    Packing materials:  None  Post-procedure details:     Patient tolerance of procedure:  Tolerated well, no immediate complications            ED Course  ED Course   Comment By Time   I did an I&D on this small little stronger and effusion between the web of the first and second toe was quite a bit of fluid there but was clear sent for culture.  I spoke to Dr. ALEJANDRA Ahuja.  He will admit the patient for Dr. Lorene Sosa MD 04/12 2202                  MDM  Number of Diagnoses or Management Options  Cellulitis of foot, left:   Patient Progress  Patient progress: improved      Final diagnoses:   Cellulitis of foot, left            Teto Sosa MD  04/12/17 2211       Electronically signed by Teto Sosa MD at 4/12/2017 10:11 PM      Pallavi De Santiago at 4/12/2017  8:29 PM          accucheck 393mg/dl MD notified.      Pallavi De Santiago  04/12/17 2029       Electronically signed by Pallavi De Santiago at 4/12/2017  8:29 PM      Frank Francois RN at 4/12/2017 10:32 PM          IV medications and  fluids completed. IV site to left forearm patent. Report called to Vicky CASTRO on [address].      Frank Francois RN  04/12/17 2232       Electronically signed by Frank Francois RN at 4/12/2017 10:32 PM        Physician Progress Notes (last 24 hours) (Notes from 4/12/2017  9:57 AM through 4/13/2017  9:57 AM)     No notes of this type exist for this encounter.        All medication doses during the admission are shown, including meds that are no longer on order.     Scheduled Meds Sorted by Name for Reny Elena as of 04/13/17 0958       1 Day 3 Days 7 Days 10 Days < Today >    Legend:                           Inactive     Active     Other Encounter    Linked               Medications 04/04 04/05 04/06 04/07 04/08 04/09 04/10 04/11 04/12 04/13   atenolol (TENORMIN) tablet 25 mg  Dose: 25 mg Freq: Daily Route: PO  Start: 04/13/17 1200             1200          cefepime (MAXIPIME) 2 g/100 mL 0.9% NS (mbp)  Dose: 2 g Freq: Every 8 Hours Route: IV  Indications of Use: SKIN AND SOFT TISSUE INFECTION  Start: 04/13/17 0745    Admin Instructions:   Activate vial before using.             0848       [id number]          cetirizine (zyrTEC) tablet 10 mg  Dose: 10 mg Freq: Daily Route: PO  Start: 04/13/17 1200             1200          clindamycin (CLEOCIN) 900 mg in dextrose 5% 50 mL IVPB (premix)  Dose: 900 mg Freq: Once Route: IV  Indications of Use: SKIN AND SOFT TISSUE INFECTION  Last Dose: Stopped (04/12/17 2220)  Start: 04/12/17 2024 End: 04/12/17 2220    Admin Instructions:   Do Not refrigerate.            2105 2220           FLUoxetine (PROzac) capsule 20 mg  Dose: 20 mg Freq: Daily Route: PO  Start: 04/13/17 1200             1200          gabapentin (NEURONTIN) capsule 800 mg  Dose: 800 mg Freq: Every 8 Hours Scheduled Route: PO  Start: 04/13/17 1200             1200       2200          HYDROcodone-acetaminophen (NORCO)  MG per tablet 1 tablet  Dose: 1 tablet Freq: Every 8 Hours Scheduled Route:  PO  Start: 04/13/17 1200    Admin Instructions:   Do not exceed 4 grams of acetaminophen in a 24 hr period.             1200       2200          hydrOXYzine (ATARAX) tablet 10 mg  Dose: 10 mg Freq: Daily Route: PO  Start: 04/13/17 1200             1200          insulin detemir (LEVEMIR) injection 22 Units  Dose: 22 Units Freq: Nightly Route: SC  Start: 04/13/17 2100    Admin Instructions:   22 or 44 units depending on bs             2100          insulin regular (humuLIN R,novoLIN R) injection 12 Units  Dose: 12 Units Freq: Once Route: SC  Start: 04/12/17 2215 End: 04/12/17 2218    Admin Instructions:               2218           lidocaine PF 1% (XYLOCAINE) injection 10 mL  Dose: 10 mL Freq: Once Route: IJ  Start: 04/12/17 2132    Admin Instructions:   Vial to bedside - Document actual dose at time of admin by provider.            2031           Pharmacy Meds to Bed Consult  Freq: Daily Route: XX  Start: 04/13/17 0800    Admin Instructions:   Patient is enrolled in our Meds to Beds service.  Hours of service is 8 am to 6 pm.  Must have prescriptions by 5:30 pm.  Contact Pharmacy Patient Services at ext #[phone] when physician completes discharge reconciliation.  Any discharges after hours will be looked at the next morning.                 (0902) [C]          sodium chloride 0.9 % bolus 1,000 mL  Dose: 1,000 mL Freq: Once Route: IV  Last Dose: Stopped (04/12/17 2231)  Start: 04/12/17 2024 End: 04/12/17 2231 2105 2231           tiZANidine (ZANAFLEX) tablet 4 mg  Dose: 4 mg Freq: Every 6 Hours Scheduled Route: PO  Start: 04/13/17 1200             1200       1800          vancomycin (VANCOCIN) 1,000 mg in sodium chloride 0.9 % 250 mL IVPB  Dose: 1,000 mg Freq: Every 24 Hours Route: IV  Indications of Use: SKIN AND SOFT TISSUE INFECTION  Start: 04/13/17 2100             2100          vancomycin (VANCOCIN) 1,000 mg in sodium chloride 0.9 % 250 mL IVPB  Dose: 1,000 mg Freq: Once Route: IV  Indications of  Use: SKIN AND SOFT TISSUE INFECTION  Last Dose: Stopped (04/12/17 2231)  Start: 04/12/17 2024 End: 04/12/17 2231 2127 2231           Medications 04/04 04/05 04/06 04/07 04/08 04/09 04/10 04/11 04/12 04/13         Continuous Meds Sorted by Name for Reny Elena as of 04/13/17 0958      Legend:         Inactive     Active     Other Encounter    Linked               Medications 04/04 04/05 04/06 04/07 04/08 04/09 04/10 04/11 04/12 04/13   Pharmacy to dose vancomycin  Freq: Continuous PRN Route: XX  PRN Reason: Consult  Start: 04/13/17 0006 End: 04/13/17 0728             0728-D/C'd      sodium chloride 0.9 % infusion  Rate: 75 mL/hr Freq: Continuous Route: IV  Last Dose: 75 mL/hr (04/13/17 0030)  Start: 04/13/17 0045             0030                PRN Meds Sorted by Name for Reny Elena as of 04/13/17 0958      Legend:         Inactive     Active     Other Encounter    Linked               Medications 04/04 04/05 04/06 04/07 04/08 04/09 04/10 04/11 04/12 04/13   acetaminophen (TYLENOL) tablet 487.5 mg  Dose: 487.5 mg Freq: Every 4 Hours PRN Route: PO  PRN Reason: Fever  Start: 04/13/17 0703    Admin Instructions:   Do not exceed 4 grams of acetaminophen in a 24 hr period.             0849 [C]          morphine injection 2 mg  Dose: 2 mg Freq: Every 2 Hours PRN Route: IV  PRN Reason: Severe Pain (7-10)  Start: 04/13/17 0010 End: 04/23/17 0009             0029       0427       0853          Pharmacy to dose vancomycin  Freq: Continuous PRN Route: XX  PRN Reason: Consult  Start: 04/13/17 0006 End: 04/13/17 0728             0728-D/C'd      temazepam (RESTORIL) capsule 30 mg  Dose: 30 mg Freq: Nightly PRN Route: PO  PRN Reason: Sleep  Start: 04/13/17 0115                        Consult Notes (last 24 hours) (Notes from 4/12/2017  9:57 AM through 4/13/2017  9:57 AM)     No notes of this type exist for this encounter.

## 2017-04-13 NOTE — CONSULTS
INFECTIOUS DISEASE CONSULTATION REPORT      Referring Provider: Dr. Paulson  Reason for Consultation: Cellulitis      Principal problem: <principal problem not specified>    Subjective .     History of present illness:    As you well know Dr. Paulson, MsTyson Elena is a 57 y.o. years old female with past medical history significant for diabetes and hypertension, who presented to [facility] Emergency Department on 4/12/2017 for left foot pain and blister formation with redness and swelling after she punctured her foot while playing with her grandson a few weeks ago.  She she states she is scheduled for follow-up with podiatry but developed a fever of 101.5 and worsening of her foot which prompted her to come to the ED.  Normal lactic acid and normal white count.  An I&D was performed in the ED with culture collected.    Infectious Disease consultation was requested for antimicrobial management.     History taken from: patient chart RN    Case was discussed with patient and nursing staff    Review of Systems    Constitutional: See history of present illness   Eyes: no eye drainage, itching or redness.  HEENT: no mouth sores, dysphagia or nose bleed.  Respiratory: no for shortness of breath, cough or production of sputum.  Cardiovascular: no chest pain, no palpitations, no orthopnea.  Gastrointestinal: no nausea, vomiting or diarrhea. No abdominal pain, hematemesis or rectal bleeding.  Genitourinary: no dysuria or polyuria.  Hematologic/lymphatic: no lymph node abnormalities, no easy bruising or easy bleeding.  Musculoskeletal: no muscle or joint pain.  Skin: See history of present illness  Neurological: no loss of consciousness, no seizure, no headache.  Behavioral/Psych: no depression or suicidal ideation.  Endocrine: no hot flashes.  Immunologic: negative.    Past Medical History    Past Medical History:   Diagnosis Date   • Arthritis    • Depression    • Diabetes mellitus    • Hypertension        Past  "Surgical History    Past Surgical History:   Procedure Laterality Date   • BACK SURGERY     • CATARACT EXTRACTION      Left    •  SECTION     • DENTAL PROCEDURE     • HYSTERECTOMY     • TOE AMPUTATION     • TUBAL ABDOMINAL LIGATION         Family History    Family History   Problem Relation Age of Onset   • Heart disease Mother    • Cancer Mother    • COPD Mother        Social History    Social History   Substance Use Topics   • Smoking status: Never Smoker   • Smokeless tobacco: None   • Alcohol use No       Allergies    Codeine; Penicillins; and Sulfa antibiotics    Objective     /51 (BP Location: Right arm, Patient Position: Lying)  Pulse 76  Temp (!) 101.5 °F (38.6 °C) (Oral)   Resp 18  Ht 66\" (167.6 cm)  Wt 157 lb 9 oz (71.5 kg)  SpO2 97%  BMI 25.43 kg/m2    Temp:  [98 °F (36.7 °C)-101.5 °F (38.6 °C)] 101.5 °F (38.6 °C)        Intake/Output Summary (Last 24 hours) at 17 0978  Last data filed at 17 0300   Gross per 24 hour   Intake                0 ml   Output                0 ml   Net                0 ml         Physical Exam:     General Appearance:    Alert, cooperative, in no acute distress   Head:    Normocephalic, without obvious abnormality, atraumatic   Eyes:            Lids and lashes normal, conjunctivae and sclerae normal, no   icterus, no pallor, corneas clear, PERRLA   Ears:    Ears appear intact with no abnormalities noted   Throat:   No oral lesions, no thrush, oral mucosa moist   Neck:   No adenopathy, supple, trachea midline, no thyromegaly, no   carotid bruit, no JVD   Back:     No tenderness to percussion or palpation, range of motion   normal   Lungs:     Clear to auscultation,respirations regular, even and unlabored. No wheezing, no ronchi and no crackles.    Heart:    Regular rhythm and normal rate, normal S1 and S2, no            murmur, no gallop, no rub, no click   Chest Wall:    No abnormalities observed   Abdomen:     Normal bowel sounds, no masses, no " organomegaly, soft        non-tender, non-distended, no guarding, no rebound                tenderness   Rectal:     Deferred   Extremities:   Moves all extremities well, no edema, no cyanosis, no             redness   Pulses:   Pulses palpable and equal bilaterally   Skin:   status post left foot I&D with erythema and drainage.     Lymph nodes:   No palpable adenopathy   Neurologic:   Awake, alert and oriented x 3. Following commands.       Results:      Results from last 7 days  Lab Units 04/12/17 2047   WBC 10*3/mm3 8.22     Lab Results   Component Value Date    NEUTROABS 5.84 04/12/2017         Results from last 7 days  Lab Units 04/12/17 2047   CREATININE mg/dL 1.78*             Imaging Results (last 24 hours)     Procedure Component Value Units Date/Time    XR Foot 2 View Left [[id number]] Updated:  04/12/17 2118          Results Review:    I have personally reviewed laboratory data, culture results, radiology studies and antimicrobial therapy.    Hospital Medications (active)       Dose Frequency Start End    acetaminophen (TYLENOL) tablet 487.5 mg 487.5 mg Every 4 Hours PRN 4/13/2017     Sig - Route: Take 1.5 tablets by mouth Every 4 (Four) Hours As Needed for Fever. - Oral    atenolol (TENORMIN) tablet 25 mg 25 mg Daily 4/13/2017     Sig - Route: Take 1 tablet by mouth Daily. - Oral    cefepime (MAXIPIME) 2 g/100 mL 0.9% NS (mbp) 2 g Every 8 Hours 4/13/2017     Sig - Route: Infuse 100 mL into a venous catheter Every 8 (Eight) Hours. - Intravenous    cetirizine (zyrTEC) tablet 10 mg 10 mg Daily 4/13/2017     Sig - Route: Take 1 tablet by mouth Daily. - Oral    clindamycin (CLEOCIN) 900 mg in dextrose 5% 50 mL IVPB (premix) 900 mg Once 4/12/2017 4/12/2017    Sig - Route: Infuse 50 mL into a venous catheter 1 (One) Time. - Intravenous    FLUoxetine (PROzac) capsule 20 mg 20 mg Daily 4/13/2017     Sig - Route: Take 1 capsule by mouth Daily. - Oral    gabapentin (NEURONTIN) capsule 800 mg 800 mg Every 8 Hours  Scheduled 4/13/2017     Sig - Route: Take 2 capsules by mouth Every 8 (Eight) Hours. - Oral    HYDROcodone-acetaminophen (NORCO)  MG per tablet 1 tablet 1 tablet Every 8 Hours Scheduled 4/13/2017     Sig - Route: Take 1 tablet by mouth Every 8 (Eight) Hours. - Oral    hydrOXYzine (ATARAX) tablet 10 mg 10 mg Daily 4/13/2017     Sig - Route: Take 1 tablet by mouth Daily. - Oral    insulin detemir (LEVEMIR) injection 22 Units 22 Units Nightly 4/13/2017     Sig - Route: Inject 22 Units under the skin Every Night. - Subcutaneous    insulin regular (humuLIN R,novoLIN R) injection 12 Units 12 Units Once 4/12/2017 4/12/2017    Sig - Route: Inject 12 Units under the skin 1 (One) Time. - Subcutaneous    lidocaine PF 1% (XYLOCAINE) injection 10 mL 10 mL Once 4/12/2017     Sig - Route: Inject 10 mL as directed 1 (One) Time. - Injection    Cosign for Ordering: Accepted by Carlos Piper MD on 4/12/2017 10:11 PM    morphine injection 2 mg 2 mg Every 2 Hours PRN 4/13/2017 4/23/2017    Sig - Route: Infuse 1 mL into a venous catheter Every 2 (Two) Hours As Needed for Severe Pain (7-10). - Intravenous    Pharmacy Meds to Bed Consult  Daily 4/13/2017     Sig - Route: Daily. - Does not apply    sodium chloride 0.9 % bolus 1,000 mL 1,000 mL Once 4/12/2017 4/12/2017    Sig - Route: Infuse 1,000 mL into a venous catheter 1 (One) Time. - Intravenous    sodium chloride 0.9 % infusion 75 mL/hr Continuous 4/13/2017     Sig - Route: Infuse 75 mL/hr into a venous catheter Continuous. - Intravenous    temazepam (RESTORIL) capsule 30 mg 30 mg Nightly PRN 4/13/2017     Sig - Route: Take 2 capsules by mouth At Night As Needed for Sleep. - Oral    tiZANidine (ZANAFLEX) tablet 4 mg 4 mg Every 6 Hours Scheduled 4/13/2017     Sig - Route: Take 1 tablet by mouth Every 6 (Six) Hours. - Oral    vancomycin (VANCOCIN) 1,000 mg in sodium chloride 0.9 % 250 mL IVPB 1,000 mg Once 4/12/2017 4/12/2017    Sig - Route: Infuse 1,000 mg into a venous catheter  1 (One) Time. - Intravenous    vancomycin (VANCOCIN) 1,000 mg in sodium chloride 0.9 % 250 mL IVPB 1,000 mg Every 24 Hours 4/13/2017     Sig - Route: Infuse 1,000 mg into a venous catheter Daily. - Intravenous    Pharmacy to dose vancomycin (Discontinued)  Continuous PRN 4/13/2017 4/13/2017    Sig - Route: Continuous As Needed for Consult. - Does not apply    Reason for Discontinue: Dose adjustment            PROBLEM LIST:    Patient Active Problem List   Diagnosis   • Cellulitis of foot, left       Assessment/Plan     ASSESSMENT:    1.  Abscess of the foot    PLAN:    Unfortunately the patient carries a poor limb prognosis in the setting of possible abscess to the foot.  Would recommend adding pseudomonal and anaerobic coverage along with vancomycin until cultures are available.  Would continue cefepime 2 g IV every 12 hours and initiated Flagyl 500 mg IV every 8 hours.  We'll continue follow culture results and adjust antibiotic therapy appropriately.  CBC and CRP ordered for a.m.      Patients findings and recommendations were discussed with patient and nursing staff    Code Status: Full Code    Annalise Garcia PA-C  04/13/17  9:49 AM

## 2017-04-13 NOTE — PLAN OF CARE
Problem: Patient Care Overview (Adult)  Goal: Plan of Care Review  Outcome: Ongoing (interventions implemented as appropriate)    04/13/17 0107   Coping/Psychosocial Response Interventions   Plan Of Care Reviewed With patient   Patient Care Overview   Progress no change         Problem: Cellulitis (Adult)  Goal: Signs and Symptoms of Listed Potential Problems Will be Absent or Manageable (Cellulitis)  Outcome: Ongoing (interventions implemented as appropriate)    04/13/17 0107   Cellulitis   Problems Assessed (Cellulitis) all   Problems Present (Cellulitis) pain         Problem: Pressure Ulcer Risk (Helio Scale) (Adult,Obstetrics,Pediatric)  Goal: Identify Related Risk Factors and Signs and Symptoms  Outcome: Ongoing (interventions implemented as appropriate)  Goal: Skin Integrity  Outcome: Ongoing (interventions implemented as appropriate)    04/13/17 0107   Pressure Ulcer Risk (Helio Scale) (Adult,Obstetrics,Pediatric)   Skin Integrity making progress toward outcome

## 2017-04-13 NOTE — PROGRESS NOTES
Patient evaluated for vancomycin dosing to treat cellulitis. Based on her demographics and estimated renal function, will dose empirically at 1 gm q 24 hrs to target trough of 15-20 mg/L. Pharmacy will follow and obtain trough level when steady state is reached to assess need for dose adjustment. Thank you for consulting.    Ashely Real [facility]  04/13/17  7:25 AM

## 2017-04-14 ENCOUNTER — APPOINTMENT (OUTPATIENT)
Dept: ULTRASOUND IMAGING | Facility: HOSPITAL | Age: 57
End: 2017-04-14
Attending: PODIATRIST

## 2017-04-14 LAB
CRP SERPL-MCNC: 30.05 MG/DL (ref 0–0.99)
GLUCOSE BLDC GLUCOMTR-MCNC: 115 MG/DL (ref 70–130)
GLUCOSE BLDC GLUCOMTR-MCNC: 206 MG/DL (ref 70–130)
GLUCOSE BLDC GLUCOMTR-MCNC: 216 MG/DL (ref 70–130)
GLUCOSE BLDC GLUCOMTR-MCNC: 222 MG/DL (ref 70–130)
GLUCOSE BLDC GLUCOMTR-MCNC: 252 MG/DL (ref 70–130)

## 2017-04-14 PROCEDURE — 25010000002 VANCOMYCIN PER 500 MG: Performed by: FAMILY MEDICINE

## 2017-04-14 PROCEDURE — 25010000002 CEFEPIME

## 2017-04-14 PROCEDURE — 82962 GLUCOSE BLOOD TEST: CPT

## 2017-04-14 PROCEDURE — 63710000001 INSULIN ASPART PER 5 UNITS: Performed by: FAMILY MEDICINE

## 2017-04-14 PROCEDURE — 25010000002 MORPHINE PER 10 MG: Performed by: FAMILY MEDICINE

## 2017-04-14 PROCEDURE — 93925 LOWER EXTREMITY STUDY: CPT | Performed by: RADIOLOGY

## 2017-04-14 PROCEDURE — 63710000001 INSULIN DETEMIR PER 5 UNITS: Performed by: FAMILY MEDICINE

## 2017-04-14 PROCEDURE — 93925 LOWER EXTREMITY STUDY: CPT

## 2017-04-14 PROCEDURE — 87040 BLOOD CULTURE FOR BACTERIA: CPT | Performed by: PODIATRIST

## 2017-04-14 PROCEDURE — 86140 C-REACTIVE PROTEIN: CPT | Performed by: PODIATRIST

## 2017-04-14 PROCEDURE — 25010000002 CEFEPIME: Performed by: FAMILY MEDICINE

## 2017-04-14 RX ORDER — ALPRAZOLAM 0.5 MG/1
0.5 TABLET ORAL ONCE
Status: COMPLETED | OUTPATIENT
Start: 2017-04-14 | End: 2017-04-14

## 2017-04-14 RX ADMIN — TIZANIDINE 4 MG: 4 TABLET ORAL at 17:17

## 2017-04-14 RX ADMIN — CEFEPIME 2 G: 2 INJECTION, POWDER, FOR SOLUTION INTRAVENOUS at 07:37

## 2017-04-14 RX ADMIN — INSULIN ASPART 4 UNITS: 100 INJECTION, SOLUTION INTRAVENOUS; SUBCUTANEOUS at 22:21

## 2017-04-14 RX ADMIN — HYDROCODONE BITARTRATE AND ACETAMINOPHEN 1 TABLET: 10; 325 TABLET ORAL at 05:42

## 2017-04-14 RX ADMIN — TIZANIDINE 4 MG: 4 TABLET ORAL at 12:08

## 2017-04-14 RX ADMIN — TIZANIDINE 4 MG: 4 TABLET ORAL at 05:42

## 2017-04-14 RX ADMIN — ATENOLOL 25 MG: 25 TABLET ORAL at 08:52

## 2017-04-14 RX ADMIN — INSULIN DETEMIR 22 UNITS: 100 INJECTION, SOLUTION SUBCUTANEOUS at 22:22

## 2017-04-14 RX ADMIN — TIZANIDINE 4 MG: 4 TABLET ORAL at 23:57

## 2017-04-14 RX ADMIN — METRONIDAZOLE 500 MG: 500 INJECTION, SOLUTION INTRAVENOUS at 03:00

## 2017-04-14 RX ADMIN — HYDROXYZINE HYDROCHLORIDE 10 MG: 10 TABLET ORAL at 08:52

## 2017-04-14 RX ADMIN — GABAPENTIN 800 MG: 400 CAPSULE ORAL at 05:42

## 2017-04-14 RX ADMIN — HYDROCODONE BITARTRATE AND ACETAMINOPHEN 1 TABLET: 10; 325 TABLET ORAL at 14:06

## 2017-04-14 RX ADMIN — HYDROCODONE BITARTRATE AND ACETAMINOPHEN 1 TABLET: 10; 325 TABLET ORAL at 22:21

## 2017-04-14 RX ADMIN — METRONIDAZOLE 500 MG: 500 INJECTION, SOLUTION INTRAVENOUS at 18:19

## 2017-04-14 RX ADMIN — ALPRAZOLAM 0.5 MG: 0.5 TABLET ORAL at 02:42

## 2017-04-14 RX ADMIN — MORPHINE SULFATE 2 MG: 2 INJECTION, SOLUTION INTRAMUSCULAR; INTRAVENOUS at 20:35

## 2017-04-14 RX ADMIN — MORPHINE SULFATE 2 MG: 2 INJECTION, SOLUTION INTRAMUSCULAR; INTRAVENOUS at 18:16

## 2017-04-14 RX ADMIN — INSULIN ASPART 4 UNITS: 100 INJECTION, SOLUTION INTRAVENOUS; SUBCUTANEOUS at 17:17

## 2017-04-14 RX ADMIN — INSULIN ASPART 4 UNITS: 100 INJECTION, SOLUTION INTRAVENOUS; SUBCUTANEOUS at 08:23

## 2017-04-14 RX ADMIN — FLUOXETINE 20 MG: 20 CAPSULE ORAL at 08:52

## 2017-04-14 RX ADMIN — CEFEPIME 2 G: 2 INJECTION, POWDER, FOR SOLUTION INTRAVENOUS at 21:22

## 2017-04-14 RX ADMIN — GABAPENTIN 800 MG: 400 CAPSULE ORAL at 22:21

## 2017-04-14 RX ADMIN — SODIUM CHLORIDE 75 ML/HR: 9 INJECTION, SOLUTION INTRAVENOUS at 19:23

## 2017-04-14 RX ADMIN — VANCOMYCIN HYDROCHLORIDE 1000 MG: 5 INJECTION, POWDER, LYOPHILIZED, FOR SOLUTION INTRAVENOUS at 22:22

## 2017-04-14 RX ADMIN — MORPHINE SULFATE 2 MG: 2 INJECTION, SOLUTION INTRAMUSCULAR; INTRAVENOUS at 12:19

## 2017-04-14 RX ADMIN — GABAPENTIN 800 MG: 400 CAPSULE ORAL at 14:06

## 2017-04-14 RX ADMIN — MORPHINE SULFATE 2 MG: 2 INJECTION, SOLUTION INTRAMUSCULAR; INTRAVENOUS at 08:23

## 2017-04-14 RX ADMIN — METRONIDAZOLE 500 MG: 500 INJECTION, SOLUTION INTRAVENOUS at 12:00

## 2017-04-14 RX ADMIN — CETIRIZINE HYDROCHLORIDE 10 MG: 10 TABLET ORAL at 08:52

## 2017-04-14 NOTE — PLAN OF CARE
Problem: Patient Care Overview (Adult)  Goal: Plan of Care Review  Outcome: Ongoing (interventions implemented as appropriate)    04/13/17 0107 04/14/17 0730   Coping/Psychosocial Response Interventions   Plan Of Care Reviewed With --  patient   Patient Care Overview   Progress no change --        Goal: Adult Individualization and Mutuality  Outcome: Ongoing (interventions implemented as appropriate)  Goal: Discharge Needs Assessment  Outcome: Ongoing (interventions implemented as appropriate)    04/13/17 1445   Discharge Needs Assessment   Concerns To Be Addressed no discharge needs identified   Readmission Within The Last 30 Days no previous admission in last 30 days   Community Agency Name(S) Previously has uses [employer].   Equipment Needed After Discharge none   Discharge Disposition still a patient   Current Health   Outpatient/Agency/Support Group Needs (Patient may need home health or DME at discharge. Will follow.)   Living Environment   Transportation Available car  (She does not drive. Family provides all transportation.)   Self-Care   Equipment Currently Used at Home none         Problem: Cellulitis (Adult)  Intervention: Monitor/Manage Neurovascular Compromise    04/14/17 0730   Skin Interventions   Skin Protection adhesive use limited;tubing/devices free from skin contact       Intervention: Monitor/Manage Pain    04/14/17 0730 04/14/17 0800 04/14/17 0823   Manage Acute Burn Pain   Bowel Intervention ambulation promoted;adequate fluid intake promoted;privacy promoted --  --    Pain Management Interventions --  --  medicated   Safety Interventions   Medication Review/Management --  medications reviewed --        Intervention: Prevent/Manage Infection Progression    04/14/17 0730 04/14/17 0800   Safety Interventions   Infection Prevention --  barrier precautions utilized;glycemic control managed;rest/sleep promoted   Safety Interventions   Infection Management aseptic technique maintained  --        Intervention: Prevent/Manage DVT/VTE Risk    04/14/17 0730   Support Surgical/Anesthesia Recovery   Venous Thromboembolism Prevent/Manage intravenous hydration;fluids promoted         Goal: Signs and Symptoms of Listed Potential Problems Will be Absent or Manageable (Cellulitis)  Outcome: Ongoing (interventions implemented as appropriate)    04/14/17 0145   Cellulitis   Problems Assessed (Cellulitis) all   Problems Present (Cellulitis) pain         Problem: Pressure Ulcer Risk (Helio Scale) (Adult,Obstetrics,Pediatric)  Intervention: Promote/Optimize Nutrition    04/14/17 0730   Nutrition Interventions   Oral Nutrition Promotion rest periods promoted       Intervention: Prevent/Manage Excess Moisture    04/14/17 0730 04/14/17 0900   Hygiene Care   Bathing/Skin Care --  linen changed;bath, partial   Skin Interventions   Skin Protection adhesive use limited;tubing/devices free from skin contact --        Intervention: Maintain Head of Bed Elevation Less Than 30 degrees as Tolerated    04/14/17 0730   Positioning   Head Of Bed (HOB) Position HOB elevated       Intervention: Prevent/Minimize Sheer/Friction Injuries    04/14/17 0730   Positioning   Positioning/Transfer Devices pillows;in use   Skin Interventions   Pressure Reduction Techniques frequent weight shift encouraged;heels elevated off bed   Pressure Reduction Devices pressure-redistributing mattress utilized       Intervention: Turn/Reposition Often    04/14/17 0730 04/14/17 0900   Positioning   Positioning --  (enc. turn)   Skin Interventions   Pressure Reduction Techniques frequent weight shift encouraged;heels elevated off bed --          Goal: Identify Related Risk Factors and Signs and Symptoms  Outcome: Ongoing (interventions implemented as appropriate)  Goal: Skin Integrity  Outcome: Ongoing (interventions implemented as appropriate)    04/13/17 1527   Pressure Ulcer Risk (Helio Scale) (Adult,Obstetrics,Pediatric)   Skin Integrity making  progress toward outcome         Problem: Pain, Acute (Adult)  Goal: Identify Related Risk Factors and Signs and Symptoms  Outcome: Ongoing (interventions implemented as appropriate)  Goal: Acceptable Pain Control/Comfort Level  Outcome: Ongoing (interventions implemented as appropriate)    04/14/17 0145   Pain, Acute (Adult)   Acceptable Pain Control/Comfort Level making progress toward outcome

## 2017-04-14 NOTE — PLAN OF CARE
Problem: Patient Care Overview (Adult)  Goal: Plan of Care Review  Outcome: Ongoing (interventions implemented as appropriate)    04/13/17 0107 04/13/17 2051   Coping/Psychosocial Response Interventions   Plan Of Care Reviewed With --  patient   Patient Care Overview   Progress no change --          Problem: Cellulitis (Adult)  Goal: Signs and Symptoms of Listed Potential Problems Will be Absent or Manageable (Cellulitis)  Outcome: Ongoing (interventions implemented as appropriate)    04/14/17 0145   Cellulitis   Problems Assessed (Cellulitis) all   Problems Present (Cellulitis) pain         Problem: Pressure Ulcer Risk (Helio Scale) (Adult,Obstetrics,Pediatric)  Goal: Identify Related Risk Factors and Signs and Symptoms  Outcome: Ongoing (interventions implemented as appropriate)    Problem: Pain, Acute (Adult)  Goal: Identify Related Risk Factors and Signs and Symptoms  Outcome: Ongoing (interventions implemented as appropriate)  Goal: Acceptable Pain Control/Comfort Level  Outcome: Ongoing (interventions implemented as appropriate)    04/14/17 0145   Pain, Acute (Adult)   Acceptable Pain Control/Comfort Level making progress toward outcome

## 2017-04-14 NOTE — PROGRESS NOTES
Reny Elena 57 y.o.   04/14/17    Subjective: Patient currently alert but very confused last night pulling out IVs and talking to the spirits  Objective: Vital signs stable currently afebrile with a MAXIMUM TEMPERATURE of 101  Vital Signs (last 72 hrs)       04/10 0700  -  04/11 0659 04/11 0700  -  04/12 0659 04/12 0700  -  04/13 0659 04/13 0700  -  04/14 0616   Most Recent    Temp (°F)     98 -  (!)101.5    98.7 -  99.7     98.7 (37.1)    Heart Rate     69 -  76    71 -  76     76    Resp     18 -  20    17 -  18     18    BP     112/51 -  175/87    91/44 -  143/76     143/76    SpO2 (%)     94 -  97      91     91        Lab Results (last 72 hours)     Procedure Component Value Units Date/Time    POC Glucose Fingerstick [[id number]]  (Abnormal) Collected:  04/12/17 2027    Specimen:  Blood Updated:  04/12/17 2032     Glucose 393 (H) mg/dL     Narrative:       Meter: [id number] : [id number] LYNDON PEMBERTON    CBC & Differential [[id number]] Collected:  04/12/17 2047    Specimen:  Blood Updated:  04/12/17 2102    Narrative:       The following orders were created for panel order CBC & Differential.  Procedure                               Abnormality         Status                     ---------                               -----------         ------                     CBC Auto Differential[[id number]]         Abnormal            Final result                 Please view results for these tests on the individual orders.    CBC Auto Differential [[id number]]  (Abnormal) Collected:  04/12/17 2047    Specimen:  Blood from Arm, Right Updated:  04/12/17 2102     WBC 8.22 10*3/mm3      RBC 3.53 (L) 10*6/mm3      Hemoglobin 10.5 (L) g/dL      Hematocrit 32.6 (L) %      MCV 92.4 fL      MCH 29.7 pg      MCHC 32.2 (L) g/dL      RDW 13.3 %      RDW-SD 44.9 fl      MPV 11.1 (H) fL      Platelets 126 (L) 10*3/mm3      Neutrophil % 71.0 (H) %      Lymphocyte % 17.4 (L) %      Monocyte % 10.1 (H) %      Eosinophil % 0.9 %       Basophil % 0.2 %      Immature Grans % 0.4 %      Neutrophils, Absolute 5.84 10*3/mm3      Lymphocytes, Absolute 1.43 10*3/mm3      Monocytes, Absolute 0.83 10*3/mm3      Eosinophils, Absolute 0.07 10*3/mm3      Basophils, Absolute 0.02 10*3/mm3      Immature Grans, Absolute 0.03 10*3/mm3     Lactic Acid, Plasma [[id number]]  (Normal) Collected:  04/12/17 2047    Specimen:  Blood from Arm, Right Updated:  04/12/17 2116     Lactate 0.8 mmol/L     Comprehensive Metabolic Panel [[id number]]  (Abnormal) Collected:  04/12/17 2047    Specimen:  Blood from Arm, Right Updated:  04/12/17 2124     Glucose 406 (H) mg/dL      BUN 20 mg/dL      Creatinine 1.78 (H) mg/dL      Sodium 133 (L) mmol/L      Potassium 4.0 mmol/L      Chloride 99 mmol/L      CO2 29.9 mmol/L      Calcium 8.8 mg/dL      Total Protein 7.0 g/dL      Albumin 4.00 g/dL      ALT (SGPT) 22 U/L      AST (SGOT) 20 U/L      Alkaline Phosphatase 92 U/L       Note New Reference Ranges        Total Bilirubin 0.6 mg/dL      eGFR Non African Amer 29 (L) mL/min/1.73      Globulin 3.0 gm/dL      A/G Ratio 1.3 (L) g/dL      BUN/Creatinine Ratio 11.2     Anion Gap 4.1 mmol/L     Osmolality, Calculated [[id number]]  (Normal) Collected:  04/12/17 2047    Specimen:  Blood from Arm, Right Updated:  04/12/17 2124     Osmolality Calc 286.1 mOsm/kg     aPTT [[id number]]  (Normal) Collected:  04/12/17 2047    Specimen:  Blood from Arm, Right Updated:  04/12/17 2124     PTT 30.8 seconds     Narrative:       Heparin protocol:    Therapeutic range 56-80 seconds    Protime-INR [[id number]]  (Normal) Collected:  04/12/17 2047    Specimen:  Blood from Arm, Right Updated:  04/12/17 2124     Protime 10.0 Seconds      INR 0.91    Narrative:       Patients not on anticoagulant therapy:    INR 0.90-1.10     Suggested INR therapeutic range for stable oral anticoagulant therapy:             Routine therapy                      2.00-3.00           Recurrent MI                         2.50-3.50            Mechanical prosthetic valve          2.50-3.50    Urinalysis With / Culture If Indicated [[id number]]  (Abnormal) Collected:  04/12/17 2136    Specimen:  Urine from Urine, Clean Catch Updated:  04/12/17 2145     Color, UA Yellow     Appearance, UA Clear     pH, UA 5.5     Specific Gravity, UA 1.012     Glucose, UA >=1000 mg/dL (3+) (A)     Ketones, UA Negative     Bilirubin, UA Negative     Blood, UA Negative     Protein, UA Negative     Leuk Esterase, UA Negative     Nitrite, UA Negative     Urobilinogen, UA 0.2 E.U./dL    Narrative:       Urine microscopic not indicated.    Wound Culture [[id number]] Collected:  04/12/17 2047    Specimen:  Wound from Foot, Left Updated:  04/13/17 0057     Gram Stain Result No WBCs or organisms seen    POC Glucose Fingerstick [[id number]]  (Abnormal) Collected:  04/13/17 1159    Specimen:  Blood Updated:  04/13/17 1202     Glucose 233 (H) mg/dL     Narrative:       Meter: [id number] : [id number] [employer]    POC Glucose Fingerstick [[id number]]  (Abnormal) Collected:  04/13/17 1705    Specimen:  Blood Updated:  04/13/17 1708     Glucose 310 (H) mg/dL     Narrative:       Meter: [id number] : [id number]    POC Glucose Fingerstick [[id number]]  (Abnormal) Collected:  04/13/17 2039    Specimen:  Blood Updated:  04/13/17 2042     Glucose 298 (H) mg/dL     Narrative:       Meter: [id number] : [id number] rafael bravo    Blood Culture [[id number]]  (Normal) Collected:  04/12/17 2047    Specimen:  Blood from Arm, Right Updated:  04/13/17 2201     Blood Culture No growth at 24 hours    Blood Culture [[id number]]  (Normal) Collected:  04/12/17 2127    Specimen:  Blood from Arm, Left Updated:  04/14/17 0002     Blood Culture No growth at 24 hours    POC Glucose Fingerstick [[id number]]  (Abnormal) Collected:  04/14/17 0320    Specimen:  Blood Updated:  04/14/17 0323     Glucose 252 (H) mg/dL     Narrative:       Meter: [id number] : [id number] rafael bravo        Imaging  Results (last 24 hours)     Procedure Component Value Units Date/Time    XR Foot 2 View Left [[id number]] Collected:  04/13/17 1002     Updated:  04/13/17 1005    Narrative:       EXAMINATION: XR FOOT 2 VW LEFT-      CLINICAL INDICATION:Infection        COMPARISON: None      TECHNIQUE: 3 views left foot     FINDINGS:   Diffuse soft tissue edema most pronounced in the mid foot and forefoot  regions. Please correlate for underlying cellulitis. No destructive  osseous changes identified to suggest advanced osteomyelitis changes  radiographically. Please note bone scan or MRI for greater sensitivity  for osteomyelitis. No acute fracture identified.       Impression:       Findings most consistent with diffuse cellulitis but no acute osseous  findings identified. If concern for osteomyelitis, bone scan or MRI  offer improved sensitivity.     This report was finalized on 4/13/2017 10:03 AM by Dr. Helder Kennedy MD.           Assessment:Active Problems:    Cellulitis of foot, left   Abscess left foot does post I&D  Plan: And U IV antibiotics wound care and await culture results

## 2017-04-14 NOTE — H&P
ADMITTING DIAGNOSIS:  Left foot cellulitis and abscess.     HISTORY OF PRESENT ILLNESS:  The patient is a 57-year-old well-known insulin-dependent diabetic with hypertension, who presented to the emergency room with swelling in the forefoot of her left foot after having several days of swelling in her dorsal foot with some tenderness and fullness. She states that she punctured her foot a number of weeks ago outside. It was incised and drained and cultured in the emergency room and she was started on IV antibiotics.     PAST MEDICAL HISTORY:  Multiple foot infections and surgical drainage and debridement along with osteo and gangrene of toe.     SOCIAL HISTORY:   She does not smoke or drink.     ALLERGIES:   1.  PENICILLIN.  2.  CODEINE.   3.  SULFA.     PHYSICAL EXAMINATION:  GENERAL:  She is alert, oriented, and in no acute distress.   NECK: Supple.   LUNGS: Clear.   HEART: S1 and S2.   ABDOMEN: Soft.  Left foot is currently dressed following incision and drainage with tenderness to palpation and swelling over her dorsal ball of her foot.     IMPRESSION: Abscess and cellulitis of the foot.     PLAN:  Await cultures, IV antibiotics, and may need surgical intervention.         D: PARAMJIT 04/14/2017 15:04:00 ET  T: nahomy / 04/14/2017 15:26:59 ET  Revision Count: 0  Job ID: [id number]  [id number]  cc:        Dictator Signature:___________________________     Yandel Paulson MD

## 2017-04-14 NOTE — PROGRESS NOTES
"  I have personally seen and examined the patient today and discussed overnight interval progress and pertinent issues with nursing staff.    Subjective    The patient was very confused overnight and pulled out her IVs.  Foot reveals large blister with extending erythema to the dorsum with bruising, swelling, and tenderness as well as warmth.  Nurse at bedside    Review of Systems    Constitutional: no fever, chills and night sweats. No appetite change or unexpected weight change. No fatigue.  Eyes: no eye drainage, itching or redness.  HEENT: no mouth sores, dysphagia or nose bleed.  Respiratory: no for shortness of breath, cough or production of sputum.  Cardiovascular: no chest pain, no palpitations, no orthopnea.  Gastrointestinal: no nausea, vomiting or diarrhea. No abdominal pain, hematemesis or rectal bleeding.  Genitourinary: no dysuria or polyuria.  Hematologic/lymphatic: no lymph node abnormalities, no easy bruising or easy bleeding.  Musculoskeletal: Positive for foot pain  Skin: Positive for foot drainage  Neurological: no loss of consciousness, no seizure, no headache.  Behavioral/Psych: no depression or suicidal ideation.  Endocrine: no hot flashes.  Immunologic: negative.      History taken from: patient chart RN      Vital Signs    /77 (BP Location: Right arm, Patient Position: Lying)  Pulse 72  Temp 98.6 °F (37 °C) (Oral)   Resp 18  Ht 66\" (167.6 cm)  Wt 157 lb 9 oz (71.5 kg)  SpO2 91%  BMI 25.43 kg/m2    Temp:  [98.6 °F (37 °C)-99.7 °F (37.6 °C)] 98.6 °F (37 °C)      Intake/Output Summary (Last 24 hours) at 04/14/17 1053  Last data filed at 04/14/17 0737   Gross per 24 hour   Intake             2210 ml   Output              600 ml   Net             1610 ml     Intake & Output (last 3 days)       04/11 0701 - 04/12 0700 04/12 0701 - 04/13 0700 04/13 0701 - 04/14 0700 04/14 0701 - 04/15 0700    P.O.  0 1860     IV Piggyback   250 100    Total Intake(mL/kg)  0 (0) 2110 (29.5) 100 (1.4)    " Urine (mL/kg/hr)   600 (0.3)     Total Output     600      Net   0 +1510 +100            Unmeasured Urine Occurrence  1 x 6 x 1 x    Unmeasured Stool Occurrence  0 x 0 x           Physical exam:    General Appearance:    Slightly confused, Alert, cooperative, in no acute distress, nurse at bedside   Head:    Normocephalic, without obvious abnormality, atraumatic   Eyes:            Lids and lashes normal, conjunctivae and sclerae normal, no   icterus, no pallor, corneas clear, PERRLA   Ears:    Ears appear intact with no abnormalities noted   Throat:   No oral lesions, no thrush, oral mucosa moist   Neck:   No adenopathy, supple, trachea midline, no thyromegaly, no   carotid bruit, no JVD   Back:     No tenderness to percussion or palpation, range of motion   normal   Lungs:     Clear to auscultation,respirations regular, even and unlabored. No wheezing, no ronchi and no crackles.    Heart:    Regular rhythm and normal rate, normal S1 and S2, no            murmur, no gallop, no rub, no click   Chest Wall:    No abnormalities observed   Abdomen:     Normal bowel sounds, no masses, no organomegaly, soft        non-tender, non-distended, no guarding, no rebound                tenderness   Rectal:     Deferred   Extremities:   Foot reveals large blister with extending erythema to the dorsum with bruising, swelling, and tenderness as well as warmth.     Pulses:   Pulses palpable and equal bilaterally   Skin:  Foot reveals large blister with extending erythema to the dorsum with bruising, swelling, and tenderness as well as warmth.     Lymph nodes:   No palpable adenopathy   Neurologic:   Awake, alert, slightly confused           Results:      Results from last 7 days  Lab Units 04/12/17 2047   WBC 10*3/mm3 8.22     Lab Results   Component Value Date    NEUTROABS 5.84 04/12/2017         Results from last 7 days  Lab Units 04/12/17 2047   CREATININE mg/dL 1.78*     Results Review:    I have personally reviewed laboratory  data, culture results, radiology studies and antimicrobial therapy.    Hospital Medications (active)       Dose Frequency Start End    acetaminophen (TYLENOL) tablet 487.5 mg 487.5 mg Every 4 Hours PRN 4/13/2017     Sig - Route: Take 1.5 tablets by mouth Every 4 (Four) Hours As Needed for Fever. - Oral    ALPRAZolam (XANAX) tablet 0.5 mg 0.5 mg Once 4/14/2017 4/14/2017    Sig - Route: Take 1 tablet by mouth 1 (One) Time. - Oral    atenolol (TENORMIN) tablet 25 mg 25 mg Daily 4/13/2017     Sig - Route: Take 1 tablet by mouth Daily. - Oral    cefepime (MAXIPIME) 2 g/100 mL 0.9% NS (mbp) 2 g Every 12 Hours 4/14/2017     Sig - Route: Infuse 100 mL into a venous catheter Every 12 (Twelve) Hours. - Intravenous    cetirizine (zyrTEC) tablet 10 mg 10 mg Daily 4/13/2017     Sig - Route: Take 1 tablet by mouth Daily. - Oral    dextrose (D50W) solution 25 g 25 g Every 15 Minutes PRN 4/13/2017     Sig - Route: Infuse 50 mL into a venous catheter Every 15 (Fifteen) Minutes As Needed for Low Blood Sugar (Blood Sugar Less Than 70, Patient Has IV Access - Unresponsive, NPO or Unable To Safely Swallow). - Intravenous    dextrose (GLUTOSE) oral gel 15 g 15 g Every 15 Minutes PRN 4/13/2017     Sig - Route: Take 15 g by mouth Every 15 (Fifteen) Minutes As Needed for Low Blood Sugar (Blood Sugar Less Than 70, Patient Alert, Is Not NPO & Can Safely Swallow). - Oral    FLUoxetine (PROzac) capsule 20 mg 20 mg Daily 4/13/2017     Sig - Route: Take 1 capsule by mouth Daily. - Oral    gabapentin (NEURONTIN) capsule 800 mg 800 mg Every 8 Hours Scheduled 4/13/2017     Sig - Route: Take 2 capsules by mouth Every 8 (Eight) Hours. - Oral    glucagon (human recombinant) (GLUCAGEN DIAGNOSTIC) injection 1 mg 1 mg Every 15 Minutes PRN 4/13/2017     Sig - Route: Inject 1 mg under the skin Every 15 (Fifteen) Minutes As Needed (Blood Glucose Less Than 70 - Patient Without IV Access - Unresponsive, NPO or Unable To Safely Swallow). - Subcutaneous     HYDROcodone-acetaminophen (NORCO)  MG per tablet 1 tablet 1 tablet Every 8 Hours Scheduled 4/13/2017     Sig - Route: Take 1 tablet by mouth Every 8 (Eight) Hours. - Oral    hydrOXYzine (ATARAX) tablet 10 mg 10 mg Daily 4/13/2017     Sig - Route: Take 1 tablet by mouth Daily. - Oral    insulin aspart (novoLOG) injection 0-9 Units 0-9 Units 4 Times Daily Before Meals & Nightly 4/13/2017     Sig - Route: Inject 0-9 Units under the skin 4 (Four) Times a Day Before Meals & at Bedtime. - Subcutaneous    insulin detemir (LEVEMIR) injection 22 Units 22 Units Nightly 4/13/2017     Sig - Route: Inject 22 Units under the skin Every Night. - Subcutaneous    lidocaine PF 1% (XYLOCAINE) injection 10 mL 10 mL Once 4/12/2017     Sig - Route: Inject 10 mL as directed 1 (One) Time. - Injection    Cosign for Ordering: Accepted by Carlos Piper MD on 4/12/2017 10:11 PM    metroNIDAZOLE (FLAGYL) IVPB 500 mg 500 mg Every 8 Hours 4/13/2017     Sig - Route: Infuse 100 mL into a venous catheter Every 8 (Eight) Hours. - Intravenous    Cosign for Ordering: Accepted by Ricci Rubio MD on 4/13/2017 12:50 PM    morphine injection 2 mg 2 mg Every 2 Hours PRN 4/13/2017 4/23/2017    Sig - Route: Infuse 1 mL into a venous catheter Every 2 (Two) Hours As Needed for Severe Pain (7-10). - Intravenous    Pharmacy Meds to Bed Consult  Daily 4/13/2017     Sig - Route: Daily. - Does not apply    sodium chloride 0.9 % infusion 75 mL/hr Continuous 4/13/2017     Sig - Route: Infuse 75 mL/hr into a venous catheter Continuous. - Intravenous    temazepam (RESTORIL) capsule 30 mg 30 mg Nightly PRN 4/13/2017     Sig - Route: Take 2 capsules by mouth At Night As Needed for Sleep. - Oral    tiZANidine (ZANAFLEX) tablet 4 mg 4 mg Every 6 Hours Scheduled 4/13/2017     Sig - Route: Take 1 tablet by mouth Every 6 (Six) Hours. - Oral    vancomycin (VANCOCIN) 1,000 mg in sodium chloride 0.9 % 250 mL IVPB 1,000 mg Every 24 Hours 4/13/2017     Sig - Route:  Infuse 1,000 mg into a venous catheter Daily. - Intravenous    cefepime (MAXIPIME) 2 g/100 mL 0.9% NS (mbp) (Discontinued) 2 g Every 8 Hours 4/13/2017 4/14/2017    Sig - Route: Infuse 100 mL into a venous catheter Every 8 (Eight) Hours. - Intravenous            Cultures:    Blood Culture   Date Value Ref Range Status   04/12/2017 No growth at 24 hours  Preliminary   04/12/2017 No growth at 24 hours  Preliminary     Wound Culture   Date Value Ref Range Status   04/12/2017 No growth at 24 hours  Preliminary       PROBLEM LIST:    Patient Active Problem List   Diagnosis   • Cellulitis of foot, left       Assessment/Plan     ASSESSMENT:    1. Abscess of the foot     PLAN:     The patient was very confused overnight and pulled out her IVs.  Foot reveals large blister with extending erythema to the dorsum with bruising, swelling, and tenderness as well as warmth.  The patient most likely has underlying osteomyelitis and is going to require surgical evaluation and since she has seen Dr. ferguson in the past a consult was put in for him.    Unfortunately the patient carries a poor limb prognosis in the setting of possible abscess to the foot. Would recommend to continue pseudomonal and anaerobic coverage along with vancomycin until cultures are available. Would continue cefepime 2 g IV every 12 hours and initiated Flagyl 500 mg IV every 8 hours. We'll continue follow culture results and adjust antibiotic therapy appropriately.       Patients findings and recommendations were discussed with patient and nursing staff    Code Status: Full Code    Annalise Garcia PA-C  04/14/17  10:53 AM    Physician Attestation:    I have personally seen and examined the patient. I reviewed the patient's data including history of present illness, review of systems, physical examination, assessment and treatment plan and agree with findings above. The assessment and plan are my own.  I have reviewed and edited the note above after discussing the  findings with Annalise Garcia PA-C.    Ricci Rubio MD  Infectious Diseases  04/14/17  2:19 PM

## 2017-04-14 NOTE — NURSING NOTE
Patient has become confused and disoriented X3 throughout the shift and has admitted to talking to spirits. Patient had removed her 1st IV. Got 2nd IV access and explained to patient that we needed to keep it. Patient removed 2nd IV as well. Patient was seen to be having hallucinations. Patient admitted to seeing and speaking to her family who are . Patient seems to be anxious and says that she has a lot of family concerns right now. Checked patients VS, O2 saturation, and blood sugar - all seem to be stable. Called  and explained that patient was confused, anxious, and seeing hallucinations - He then gave a 1X order for 0.5 Xanax. Patient appears to be asleep in the bed right now with the bed alarm on.

## 2017-04-14 NOTE — PROGRESS NOTES
Cefepime dose was decreased to 2g iv q12hrs for CrCl = 31 ml/min based on literature recommendations per P&T. Thank you.

## 2017-04-15 ENCOUNTER — ANESTHESIA (OUTPATIENT)
Dept: PERIOP | Facility: HOSPITAL | Age: 57
End: 2017-04-15

## 2017-04-15 ENCOUNTER — ANESTHESIA EVENT (OUTPATIENT)
Dept: PERIOP | Facility: HOSPITAL | Age: 57
End: 2017-04-15

## 2017-04-15 LAB
ANION GAP SERPL CALCULATED.3IONS-SCNC: 10.4 MMOL/L (ref 3.6–11.2)
BUN BLD-MCNC: 13 MG/DL (ref 7–21)
BUN/CREAT SERPL: 9.4 (ref 7–25)
CALCIUM SPEC-SCNC: 8.7 MG/DL (ref 7.7–10)
CHLORIDE SERPL-SCNC: 107 MMOL/L (ref 99–112)
CO2 SERPL-SCNC: 17.6 MMOL/L (ref 24.3–31.9)
CREAT BLD-MCNC: 1.38 MG/DL (ref 0.43–1.29)
ERYTHROCYTE [SEDIMENTATION RATE] IN BLOOD: 81 MM/HR (ref 0–30)
GFR SERPL CREATININE-BSD FRML MDRD: 39 ML/MIN/1.73
GLUCOSE BLD-MCNC: 218 MG/DL (ref 70–110)
GLUCOSE BLDC GLUCOMTR-MCNC: 170 MG/DL (ref 70–130)
GLUCOSE BLDC GLUCOMTR-MCNC: 186 MG/DL (ref 70–130)
GLUCOSE BLDC GLUCOMTR-MCNC: 233 MG/DL (ref 70–130)
GLUCOSE BLDC GLUCOMTR-MCNC: 242 MG/DL (ref 70–130)
OSMOLALITY SERPL CALC.SUM OF ELEC: 276.9 MOSM/KG (ref 273–305)
POTASSIUM BLD-SCNC: 4.2 MMOL/L (ref 3.5–5.3)
SODIUM BLD-SCNC: 135 MMOL/L (ref 135–153)
VANCOMYCIN TROUGH SERPL-MCNC: 9.7 MCG/ML (ref 5–15)

## 2017-04-15 PROCEDURE — 87077 CULTURE AEROBIC IDENTIFY: CPT | Performed by: FAMILY MEDICINE

## 2017-04-15 PROCEDURE — 87070 CULTURE OTHR SPECIMN AEROBIC: CPT | Performed by: FAMILY MEDICINE

## 2017-04-15 PROCEDURE — 25010000002 PROPOFOL 10 MG/ML EMULSION: Performed by: NURSE ANESTHETIST, CERTIFIED REGISTERED

## 2017-04-15 PROCEDURE — 25010000002 MORPHINE PER 10 MG: Performed by: FAMILY MEDICINE

## 2017-04-15 PROCEDURE — 80202 ASSAY OF VANCOMYCIN: CPT

## 2017-04-15 PROCEDURE — 0JBR0ZZ EXCISION OF LEFT FOOT SUBCUTANEOUS TISSUE AND FASCIA, OPEN APPROACH: ICD-10-PCS | Performed by: PODIATRIST

## 2017-04-15 PROCEDURE — 82962 GLUCOSE BLOOD TEST: CPT

## 2017-04-15 PROCEDURE — 94799 UNLISTED PULMONARY SVC/PX: CPT

## 2017-04-15 PROCEDURE — 87147 CULTURE TYPE IMMUNOLOGIC: CPT | Performed by: FAMILY MEDICINE

## 2017-04-15 PROCEDURE — 87205 SMEAR GRAM STAIN: CPT | Performed by: PODIATRIST

## 2017-04-15 PROCEDURE — 87186 SC STD MICRODIL/AGAR DIL: CPT | Performed by: PODIATRIST

## 2017-04-15 PROCEDURE — 63710000001 INSULIN ASPART PER 5 UNITS: Performed by: FAMILY MEDICINE

## 2017-04-15 PROCEDURE — 25010000002 ONDANSETRON PER 1 MG: Performed by: NURSE ANESTHETIST, CERTIFIED REGISTERED

## 2017-04-15 PROCEDURE — 87070 CULTURE OTHR SPECIMN AEROBIC: CPT | Performed by: PODIATRIST

## 2017-04-15 PROCEDURE — 63710000001 INSULIN DETEMIR PER 5 UNITS: Performed by: FAMILY MEDICINE

## 2017-04-15 PROCEDURE — 80048 BASIC METABOLIC PNL TOTAL CA: CPT

## 2017-04-15 PROCEDURE — 85652 RBC SED RATE AUTOMATED: CPT | Performed by: PODIATRIST

## 2017-04-15 PROCEDURE — 25010000002 VANCOMYCIN PER 500 MG: Performed by: FAMILY MEDICINE

## 2017-04-15 PROCEDURE — 87205 SMEAR GRAM STAIN: CPT | Performed by: FAMILY MEDICINE

## 2017-04-15 PROCEDURE — 87147 CULTURE TYPE IMMUNOLOGIC: CPT | Performed by: PODIATRIST

## 2017-04-15 PROCEDURE — 87077 CULTURE AEROBIC IDENTIFY: CPT | Performed by: PODIATRIST

## 2017-04-15 PROCEDURE — 25010000002 CEFEPIME

## 2017-04-15 PROCEDURE — 0J9R0ZZ DRAINAGE OF LEFT FOOT SUBCUTANEOUS TISSUE AND FASCIA, OPEN APPROACH: ICD-10-PCS | Performed by: PODIATRIST

## 2017-04-15 PROCEDURE — 25010000002 MIDAZOLAM PER 1 MG: Performed by: NURSE ANESTHETIST, CERTIFIED REGISTERED

## 2017-04-15 PROCEDURE — 87186 SC STD MICRODIL/AGAR DIL: CPT | Performed by: FAMILY MEDICINE

## 2017-04-15 RX ORDER — MEPERIDINE HYDROCHLORIDE 25 MG/ML
12.5 INJECTION INTRAMUSCULAR; INTRAVENOUS; SUBCUTANEOUS
Status: DISCONTINUED | OUTPATIENT
Start: 2017-04-15 | End: 2017-04-15 | Stop reason: HOSPADM

## 2017-04-15 RX ORDER — HYDROCODONE BITARTRATE AND ACETAMINOPHEN 7.5; 325 MG/1; MG/1
1 TABLET ORAL EVERY 4 HOURS PRN
Status: DISCONTINUED | OUTPATIENT
Start: 2017-04-15 | End: 2017-04-18 | Stop reason: SDUPTHER

## 2017-04-15 RX ORDER — SODIUM CHLORIDE, SODIUM LACTATE, POTASSIUM CHLORIDE, CALCIUM CHLORIDE 600; 310; 30; 20 MG/100ML; MG/100ML; MG/100ML; MG/100ML
125 INJECTION, SOLUTION INTRAVENOUS CONTINUOUS
Status: CANCELLED | OUTPATIENT
Start: 2017-04-15

## 2017-04-15 RX ORDER — SODIUM CHLORIDE 9 MG/ML
100 INJECTION, SOLUTION INTRAVENOUS CONTINUOUS
Status: DISCONTINUED | OUTPATIENT
Start: 2017-04-15 | End: 2017-04-20 | Stop reason: HOSPADM

## 2017-04-15 RX ORDER — ONDANSETRON 2 MG/ML
INJECTION INTRAMUSCULAR; INTRAVENOUS AS NEEDED
Status: DISCONTINUED | OUTPATIENT
Start: 2017-04-15 | End: 2017-04-15 | Stop reason: SURG

## 2017-04-15 RX ORDER — PROPOFOL 10 MG/ML
VIAL (ML) INTRAVENOUS AS NEEDED
Status: DISCONTINUED | OUTPATIENT
Start: 2017-04-15 | End: 2017-04-15 | Stop reason: SURG

## 2017-04-15 RX ORDER — SODIUM HYPOCHLORITE 1.25 MG/ML
SOLUTION TOPICAL AS NEEDED
Status: DISCONTINUED | OUTPATIENT
Start: 2017-04-15 | End: 2017-04-15 | Stop reason: HOSPADM

## 2017-04-15 RX ORDER — IPRATROPIUM BROMIDE AND ALBUTEROL SULFATE 2.5; .5 MG/3ML; MG/3ML
3 SOLUTION RESPIRATORY (INHALATION) ONCE AS NEEDED
Status: DISCONTINUED | OUTPATIENT
Start: 2017-04-15 | End: 2017-04-15 | Stop reason: HOSPADM

## 2017-04-15 RX ORDER — IBUPROFEN 800 MG/1
800 TABLET ORAL EVERY 6 HOURS PRN
Status: DISCONTINUED | OUTPATIENT
Start: 2017-04-15 | End: 2017-04-18 | Stop reason: SDUPTHER

## 2017-04-15 RX ORDER — SODIUM CHLORIDE 0.9 % (FLUSH) 0.9 %
1-10 SYRINGE (ML) INJECTION AS NEEDED
Status: CANCELLED | OUTPATIENT
Start: 2017-04-15

## 2017-04-15 RX ORDER — MORPHINE SULFATE 2 MG/ML
1 INJECTION, SOLUTION INTRAMUSCULAR; INTRAVENOUS EVERY 4 HOURS PRN
Status: DISCONTINUED | OUTPATIENT
Start: 2017-04-15 | End: 2017-04-18 | Stop reason: SDUPTHER

## 2017-04-15 RX ORDER — FAMOTIDINE 10 MG/ML
INJECTION, SOLUTION INTRAVENOUS AS NEEDED
Status: DISCONTINUED | OUTPATIENT
Start: 2017-04-15 | End: 2017-04-15 | Stop reason: SURG

## 2017-04-15 RX ORDER — FENTANYL CITRATE 50 UG/ML
50 INJECTION, SOLUTION INTRAMUSCULAR; INTRAVENOUS
Status: DISCONTINUED | OUTPATIENT
Start: 2017-04-15 | End: 2017-04-15 | Stop reason: HOSPADM

## 2017-04-15 RX ORDER — SODIUM CHLORIDE, SODIUM LACTATE, POTASSIUM CHLORIDE, CALCIUM CHLORIDE 600; 310; 30; 20 MG/100ML; MG/100ML; MG/100ML; MG/100ML
INJECTION, SOLUTION INTRAVENOUS CONTINUOUS PRN
Status: DISCONTINUED | OUTPATIENT
Start: 2017-04-15 | End: 2017-04-15 | Stop reason: SURG

## 2017-04-15 RX ORDER — MIDAZOLAM HYDROCHLORIDE 1 MG/ML
INJECTION INTRAMUSCULAR; INTRAVENOUS AS NEEDED
Status: DISCONTINUED | OUTPATIENT
Start: 2017-04-15 | End: 2017-04-15 | Stop reason: SURG

## 2017-04-15 RX ORDER — NALOXONE HCL 0.4 MG/ML
0.4 VIAL (ML) INJECTION
Status: DISCONTINUED | OUTPATIENT
Start: 2017-04-15 | End: 2017-04-20 | Stop reason: HOSPADM

## 2017-04-15 RX ORDER — ONDANSETRON 2 MG/ML
4 INJECTION INTRAMUSCULAR; INTRAVENOUS ONCE AS NEEDED
Status: DISCONTINUED | OUTPATIENT
Start: 2017-04-15 | End: 2017-04-15 | Stop reason: HOSPADM

## 2017-04-15 RX ADMIN — VANCOMYCIN HYDROCHLORIDE 1000 MG: 5 INJECTION, POWDER, LYOPHILIZED, FOR SOLUTION INTRAVENOUS at 22:18

## 2017-04-15 RX ADMIN — MIDAZOLAM HYDROCHLORIDE 2 MG: 1 INJECTION, SOLUTION INTRAMUSCULAR; INTRAVENOUS at 08:00

## 2017-04-15 RX ADMIN — PROPOFOL 180 MG: 10 INJECTION, EMULSION INTRAVENOUS at 08:07

## 2017-04-15 RX ADMIN — HYDROCODONE BITARTRATE AND ACETAMINOPHEN 1 TABLET: 10; 325 TABLET ORAL at 21:26

## 2017-04-15 RX ADMIN — SODIUM CHLORIDE, POTASSIUM CHLORIDE, SODIUM LACTATE AND CALCIUM CHLORIDE: 600; 310; 30; 20 INJECTION, SOLUTION INTRAVENOUS at 07:48

## 2017-04-15 RX ADMIN — EPHEDRINE SULFATE 10 MG: 50 INJECTION INTRAMUSCULAR; INTRAVENOUS; SUBCUTANEOUS at 08:27

## 2017-04-15 RX ADMIN — CEFEPIME 2 G: 2 INJECTION, POWDER, FOR SOLUTION INTRAVENOUS at 21:26

## 2017-04-15 RX ADMIN — INSULIN ASPART 4 UNITS: 100 INJECTION, SOLUTION INTRAVENOUS; SUBCUTANEOUS at 18:05

## 2017-04-15 RX ADMIN — FAMOTIDINE 20 MG: 10 INJECTION, SOLUTION INTRAVENOUS at 08:00

## 2017-04-15 RX ADMIN — INSULIN ASPART 4 UNITS: 100 INJECTION, SOLUTION INTRAVENOUS; SUBCUTANEOUS at 21:26

## 2017-04-15 RX ADMIN — METRONIDAZOLE 500 MG: 500 INJECTION, SOLUTION INTRAVENOUS at 03:46

## 2017-04-15 RX ADMIN — HYDROCODONE BITARTRATE AND ACETAMINOPHEN 1 TABLET: 10; 325 TABLET ORAL at 15:12

## 2017-04-15 RX ADMIN — SODIUM CHLORIDE 100 ML/HR: 9 INJECTION, SOLUTION INTRAVENOUS at 15:13

## 2017-04-15 RX ADMIN — INSULIN ASPART 2 UNITS: 100 INJECTION, SOLUTION INTRAVENOUS; SUBCUTANEOUS at 12:54

## 2017-04-15 RX ADMIN — SODIUM CHLORIDE 75 ML/HR: 9 INJECTION, SOLUTION INTRAVENOUS at 12:54

## 2017-04-15 RX ADMIN — METRONIDAZOLE 500 MG: 500 INJECTION, SOLUTION INTRAVENOUS at 20:00

## 2017-04-15 RX ADMIN — METRONIDAZOLE 500 MG: 500 INJECTION, SOLUTION INTRAVENOUS at 12:54

## 2017-04-15 RX ADMIN — MORPHINE SULFATE 2 MG: 2 INJECTION, SOLUTION INTRAMUSCULAR; INTRAVENOUS at 03:46

## 2017-04-15 RX ADMIN — GABAPENTIN 800 MG: 400 CAPSULE ORAL at 21:26

## 2017-04-15 RX ADMIN — ATENOLOL 25 MG: 25 TABLET ORAL at 06:57

## 2017-04-15 RX ADMIN — GABAPENTIN 800 MG: 400 CAPSULE ORAL at 15:12

## 2017-04-15 RX ADMIN — TIZANIDINE 4 MG: 4 TABLET ORAL at 12:54

## 2017-04-15 RX ADMIN — INSULIN DETEMIR 22 UNITS: 100 INJECTION, SOLUTION SUBCUTANEOUS at 21:26

## 2017-04-15 RX ADMIN — ONDANSETRON 4 MG: 2 INJECTION, SOLUTION INTRAMUSCULAR; INTRAVENOUS at 08:00

## 2017-04-15 RX ADMIN — SODIUM CHLORIDE 100 ML/HR: 9 INJECTION, SOLUTION INTRAVENOUS at 12:55

## 2017-04-15 RX ADMIN — TIZANIDINE 4 MG: 4 TABLET ORAL at 18:05

## 2017-04-15 NOTE — ANESTHESIA PREPROCEDURE EVALUATION
Anesthesia Evaluation     Patient summary reviewed   no history of anesthetic complications:  NPO Status: > 8 hours   Airway   Mallampati: II  TM distance: >3 FB  Neck ROM: full  no difficulty expected  Dental - normal exam     Pulmonary - negative pulmonary ROS and normal exam   Cardiovascular     Rhythm: regular  Rate: normal    (+) hypertension well controlled,       Neuro/Psych  (+) psychiatric history Depression,    GI/Hepatic/Renal/Endo    (+)  diabetes mellitus type 2 poorly controlled using insulin,     Musculoskeletal     Abdominal  - normal exam   Substance History - negative use     OB/GYN negative ob/gyn ROS         Other   (+) arthritis                               Anesthesia Plan    ASA 3     general     intravenous induction   Anesthetic plan and risks discussed with patient and spouse/significant other.  Use of blood products discussed with patient and spouse/significant other  Consented to blood products.   Plan discussed with attending.

## 2017-04-15 NOTE — PROGRESS NOTES
"     LOS: 3 days     Chief Complaint:  Abscess of foot    Subjective     Interval History:     She is awake and alert today.  She is postoperative.  This morning she went incision and drainage of the subcutaneous abscess left foot with debridement of necrotic tissue.  She's having some pain postoperatively.  She's not having any fevers and denies any chills.  Blood sugars are running between 170 and 222 over the past 12 hours.  She is on Levemir and sliding scale insulin.  No episodes of hypoglycemia.      Objective     Vital Signs  /73 (BP Location: Right arm, Patient Position: Lying)  Pulse 75  Temp 97.9 °F (36.6 °C) (Oral)   Resp 20  Ht 66\" (167.6 cm)  Wt 157 lb 9 oz (71.5 kg)  SpO2 92%  BMI 25.43 kg/m2  Intake & Output (last day)       04/14 0701 - 04/15 0700 04/15 0701 - 04/16 0700    P.O. 1080     I.V. (mL/kg) 1000 (14) 500 (7)    IV Piggyback 100     Total Intake(mL/kg) 2180 (30.5) 500 (7)    Urine (mL/kg/hr)      Total Output        Net +2180 +500          Unmeasured Urine Occurrence 5 x     Unmeasured Stool Occurrence 0 x             Physical Exam:     General Appearance:    Alert, cooperative, in no acute distress   Head:    Normocephalic, without obvious abnormality, atraumatic   Eyes:            Lids and lashes normal, conjunctivae and sclerae normal, no   icterus, no pallor, corneas clear, PERRLA   Ears:    Ears appear intact with no abnormalities noted   Throat:   No oral lesions, no thrush, oral mucosa moist   Neck:   No adenopathy, supple, trachea midline, no thyromegaly, no   carotid bruit, no JVD   Lungs:     Clear to auscultation,respirations regular, even and                  unlabored    Heart:    Regular rhythm and normal rate, normal S1 and S2, no            murmur, no gallop, no rub, no click   Chest Wall:    No abnormalities observed   Abdomen:     Normal bowel sounds, no masses, no organomegaly, soft        non-tender, non-distended, no guarding, no rebound                " tenderness   Extremities:   diminished sensation both feet.  No edema or cyanosis.  Foot bandaged.     Pulses:   Pulses palpable and equal bilaterally   Skin:   No bleeding, bruising or rash   Lymph nodes:   No palpable adenopathy   Neurologic:   Cranial nerves 2 - 12 grossly intact, sensation intact, DTR       present and equal bilaterally        Results Review:    Lab Results   Component Value Date    WBC 8.22 04/12/2017    HGB 10.5 (L) 04/12/2017    HCT 32.6 (L) 04/12/2017    MCV 92.4 04/12/2017     (L) 04/12/2017       Lab Results   Component Value Date    GLUCOSE 218 (H) 04/15/2017    BUN 13 04/15/2017    CREATININE 1.38 (H) 04/15/2017    EGFRIFNONA 39 (L) 04/15/2017    BCR 9.4 04/15/2017     04/15/2017    K 4.2 04/15/2017     04/15/2017    CO2 17.6 (L) 04/15/2017    CALCIUM 8.7 04/15/2017    ALBUMIN 4.00 04/12/2017    LABIL2 1.3 (L) 04/12/2017    AST 20 04/12/2017    ALT 22 04/12/2017     Lab Results   Component Value Date    INR 0.91 04/12/2017       Glucose   Date Value Ref Range Status   04/15/2017 170 (H) 70 - 130 mg/dL Final   04/15/2017 186 (H) 70 - 130 mg/dL Final   04/14/2017 222 (H) 70 - 130 mg/dL Final   04/14/2017 206 (H) 70 - 130 mg/dL Final   04/14/2017 115 70 - 130 mg/dL Final          Medication Review:     Current Facility-Administered Medications:   •  acetaminophen (TYLENOL) tablet 487.5 mg, 487.5 mg, Oral, Q4H PRN, Yandel Paulson MD, 487.5 mg at 04/13/17 0849  •  atenolol (TENORMIN) tablet 25 mg, 25 mg, Oral, Daily, Yandel Paulson MD, 25 mg at 04/15/17 0657  •  cefepime (MAXIPIME) 2 g/100 mL 0.9% NS (mbp), 2 g, Intravenous, Q12H, Funmilayo Ventura, [facility], 2 g at 04/14/17 2122  •  cetirizine (zyrTEC) tablet 10 mg, 10 mg, Oral, Daily, Yandel Paulson MD, 10 mg at 04/14/17 0852  •  dextrose (D50W) solution 25 g, 25 g, Intravenous, Q15 Min PRN, Yandel Paulson MD  •  dextrose (GLUTOSE) oral gel 15 g, 15 g, Oral, Q15 Min PRN, Yandel Paulson MD  •  [START  ON 4/16/2017] enoxaparin (LOVENOX) syringe 40 mg, 40 mg, Subcutaneous, Daily, Basilio Morris MD  •  FLUoxetine (PROzac) capsule 20 mg, 20 mg, Oral, Daily, Yandel Paulson MD, 20 mg at 04/14/17 0852  •  gabapentin (NEURONTIN) capsule 800 mg, 800 mg, Oral, Q8H, Yandel Paulson MD, 800 mg at 04/14/17 2221  •  glucagon (human recombinant) (GLUCAGEN DIAGNOSTIC) injection 1 mg, 1 mg, Subcutaneous, Q15 Min PRN, Yandel Paulson MD  •  HYDROcodone-acetaminophen (NORCO)  MG per tablet 1 tablet, 1 tablet, Oral, Q8H, Yandel Paulson MD, 1 tablet at 04/14/17 2221  •  HYDROcodone-acetaminophen (NORCO) 7.5-325 MG per tablet 1 tablet, 1 tablet, Oral, Q4H PRN, Basilio Morris MD  •  hydrOXYzine (ATARAX) tablet 10 mg, 10 mg, Oral, Daily, Yandel Paulson MD, 10 mg at 04/14/17 0852  •  ibuprofen (ADVIL,MOTRIN) tablet 800 mg, 800 mg, Oral, Q6H PRN, Basilio Morris MD  •  insulin aspart (novoLOG) injection 0-9 Units, 0-9 Units, Subcutaneous, 4x Daily AC & at Bedtime, Yandel Paulson MD, 4 Units at 04/14/17 2221  •  insulin detemir (LEVEMIR) injection 22 Units, 22 Units, Subcutaneous, Nightly, Yandel Paulson MD, 22 Units at 04/14/17 2222  •  lidocaine PF 1% (XYLOCAINE) injection 10 mL, 10 mL, Injection, Once, Carlos Piper MD  •  metroNIDAZOLE (FLAGYL) IVPB 500 mg, 500 mg, Intravenous, Q8H, Annalise Garcia PA-C, 500 mg at 04/15/17 0346  •  morphine injection 1 mg, 1 mg, Intravenous, Q4H PRN **AND** naloxone (NARCAN) injection 0.4 mg, 0.4 mg, Intravenous, Q5 Min PRN, Basilio Morris MD  •  morphine injection 2 mg, 2 mg, Intravenous, Q2H PRN, Alonso Ahuja MD, 2 mg at 04/15/17 0346  •  Pharmacy Meds to Bed Consult, , Does not apply, Daily, Kiki Rahman, [facility]  •  promethazine (PHENERGAN) 12.5 mg in sodium chloride 0.9 % 50 mL, 12.5 mg, Intravenous, Q4H PRN, Basilio Morris MD  •  sodium chloride 0.9 % infusion, 75 mL/hr, Intravenous, Continuous, Alonso Ahuja MD, Last Rate: 75 mL/hr at 04/14/17  1923, 75 mL/hr at 04/14/17 1923  •  sodium chloride 0.9 % infusion, 100 mL/hr, Intravenous, Continuous, Basilio Morris MD  •  temazepam (RESTORIL) capsule 30 mg, 30 mg, Oral, Nightly PRN, Alonso Ahuja MD, 30 mg at 04/13/17 2051  •  tiZANidine (ZANAFLEX) tablet 4 mg, 4 mg, Oral, Q6H, Yandel Paulson MD, 4 mg at 04/14/17 2357  •  vancomycin (VANCOCIN) 1,000 mg in sodium chloride 0.9 % 250 mL IVPB, 1,000 mg, Intravenous, Q24H, Alonso Ahuja MD, 1,000 mg at 04/14/17 2222      Assessment/Plan     Active Problems:    Cellulitis of foot, left    Type 2 diabetes mellitus with diabetic peripheral neuropathy.  Requiring long-term insulin.    Sliding scale insulin.  Accu-Cheks every before meals and at bedtime.  Continue Levemir    Cefepime and vancomycin and Flagyl ongoing    Continue hydrocodone for pain control.  She does have when necessary morphine      Samuel Duane Kreis, MD  04/15/17  12:23 PM

## 2017-04-15 NOTE — PLAN OF CARE
Problem: Patient Care Overview (Adult)  Goal: Plan of Care Review  Outcome: Ongoing (interventions implemented as appropriate)    04/15/17 0934   Coping/Psychosocial Response Interventions   Plan Of Care Reviewed With patient         Problem: Cellulitis (Adult)  Goal: Signs and Symptoms of Listed Potential Problems Will be Absent or Manageable (Cellulitis)  Outcome: Ongoing (interventions implemented as appropriate)    04/14/17 0145   Cellulitis   Problems Assessed (Cellulitis) all   Problems Present (Cellulitis) pain         Problem: Pressure Ulcer Risk (Helio Scale) (Adult,Obstetrics,Pediatric)  Goal: Identify Related Risk Factors and Signs and Symptoms  Outcome: Ongoing (interventions implemented as appropriate)    04/15/17 0019   Pressure Ulcer Risk (Helio Scale)   Related Risk Factors (Pressure Ulcer Risk (Helio Scale)) infection;mobility impaired         Problem: Pain, Acute (Adult)  Goal: Identify Related Risk Factors and Signs and Symptoms  Outcome: Ongoing (interventions implemented as appropriate)    04/15/17 0019   Pain, Acute   Related Risk Factors (Acute Pain) persistent pain;infection   Signs and Symptoms (Acute Pain) guarding/abnormal posturing/positioning;questions meaning of pain

## 2017-04-15 NOTE — ANESTHESIA POSTPROCEDURE EVALUATION
Patient: Reny Elena    Procedure Summary     Date Anesthesia Start Anesthesia Stop Room / Location    04/15/17 0800 0850  [facility] / [facility]       Procedure Diagnosis Surgeon Provider    INCISION AND DRAINAGE LOWER EXTREMITY (Left ) Cellulitis of foot, left  (Cellulitis of foot, left [L03.116]) MD Jasvir Morales DO          Anesthesia Type: general  Last vitals  /82 (04/15/17 0934)    Temp 98.4 °F (36.9 °C) (04/15/17 0934)    Pulse 80 (04/15/17 0934)   Resp 15 (04/15/17 0922)    SpO2 98 % (04/15/17 0934)      Post Anesthesia Care and Evaluation    Patient location during evaluation: PHASE II  Patient participation: complete - patient participated  Level of consciousness: awake and alert  Pain score: 1  Pain management: adequate  Airway patency: patent  Anesthetic complications: No anesthetic complications  PONV Status: controlled  Cardiovascular status: acceptable  Respiratory status: acceptable  Hydration status: acceptable

## 2017-04-15 NOTE — OP NOTE
INCISION AND DRAINAGE LOWER EXTREMITY  Procedure Note    Reny Elena  4/12/2017 - 4/15/2017      Pre-op Diagnosis:   Cellulitis of foot, left [L03.116]    Post-op Diagnosis:     Post-Op Diagnosis Codes:     * Cellulitis of foot, left [L03.116]    Procedure(s):  1. Incision and Drainage Abcess Subcutaneous, Left Foot  2. Excisional Debridement Necrotic Tissue Plantar and Medial First MTPJ Wounds, Left foot     Surgeon(s):  Basilio Morris MD    Anesthesia: Choice    Staff:   Circulator: Brii Andino, RN  Scrub Person: Lizzy Pugh  Assistant: Curtis Szymanski    HEMOSTASIS:PCT, 250mmhg    Estimated Blood Loss: * No values recorded between 4/15/2017  8:03 AM and 4/15/2017  8:50 AM *    Specimens:                  Order Name Source Comment Collection Info Order Time   CULTURE, ROUTINE Toe, Left  Collected By: Basilio Morris MD 4/15/2017  8:43 AM   CULTURE, ROUTINE Toe, Left  Collected By: Basilio Morris MD 4/15/2017  8:43 AM   TISSUE EXAM Toe, Left  Collected By: Basilio Morris MD 4/15/2017  8:43 AM       Grafts/Implants:none     Injectables:.5% Marcaine Plain      Drains:           Findings: Abcess first Webspace, abcess medial and plantar central subq first MTPJ, Left foot     Complications: None   Procedure(s):    1. Incision and Drainage Abcess Subcutaneous, Left Foot.   Under IV sedation the patient was brought to the OR placed in operative table in the supine position.  The extremity was scrubbed prepped and draped in usual sterile manner.  The limb was elevated disseminated pneumatic tourniquet for approximately 250 mmHg pressure at the calf level.  Attention was then directed to the left lower extremity where the incision was made on the plantar wound where a Rika hemostat was used for dissection of the subcutaneous tissue abscess where abscess of the first webspace plantar central and medial first met jet a were decompressed and evacuated of infectious serosanguineous fluid.  This area  was fully irrigated with 2 L vancomycin infiltrated saline pulse lavage irrigation with light curettage.  The 3 mm plantar first MTP joint wound seen to sinus tract through the subcutaneous tissue to the medial first MPJ exit wound/abscess.  After thorough irrigation of all abscess voids and sinus tracts this was packed with quarter-inch string gauze soaked in quarter strength Dakin solution.    2. Excisional Debridement Necrotic Tissue Plantar and Medial First MTPJ Wounds, Left foot .  The nonviable sloughing necrotic tissue surrounding the entire first MTP joint was sharply excisionally debrided with iris scissors pickups, the sloughing necrotic tissue attached was curettaged to normal tissue layer.  The necrotic tissue within the plantar and medial first MTP joint wounds was sharply excised with iris scissors pickups and Leonid.  This tissue sent for C&S and Gram stain.  Povidone Dressing was applied to the macerated skin first MPJ area bulky dressing was applied.  A 10 cc point percent Marcaine plain lopez block was performed proximal to the infectious area postoperatively. Tourniquet released vascular response nourished extremity patient transferred or PACU vessel stable neurovascular status intact extremity irritation    Basilio Morris MD     Date: 4/15/2017  Time: 9:01 AM

## 2017-04-15 NOTE — ANESTHESIA PROCEDURE NOTES
Airway  Urgency: elective    Date/Time: 4/15/2017 8:07 AM  End Time:4/15/2017 8:08 AM    General Information and Staff    Patient location during procedure: OR  Anesthesiologist: WATSON DENISE  CRNA: SNEHA SEPULVEDA    Indications and Patient Condition  Indications for airway management: airway protection    Preoxygenated: yes  MILS maintained throughout  Mask difficulty assessment: 0 - not attempted    Final Airway Details  Final airway type: supraglottic airway      Successful airway: unique  Size 3  Airway Seal Pressure (cm H2O): 20    Number of attempts at approach: 1    Additional Comments  Atraumatic

## 2017-04-15 NOTE — PLAN OF CARE
Problem: Patient Care Overview (Adult)  Goal: Plan of Care Review    04/15/17 0019   Coping/Psychosocial Response Interventions   Plan Of Care Reviewed With patient   Patient Care Overview   Progress no change         Problem: Cellulitis (Adult)  Goal: Signs and Symptoms of Listed Potential Problems Will be Absent or Manageable (Cellulitis)    04/14/17 0145   Cellulitis   Problems Assessed (Cellulitis) all   Problems Present (Cellulitis) pain         Problem: Pressure Ulcer Risk (Helio Scale) (Adult,Obstetrics,Pediatric)  Goal: Identify Related Risk Factors and Signs and Symptoms    04/15/17 0019   Pressure Ulcer Risk (Helio Scale)   Related Risk Factors (Pressure Ulcer Risk (Helio Scale)) infection;mobility impaired       Goal: Skin Integrity    04/15/17 0019   Pressure Ulcer Risk (Helio Scale) (Adult,Obstetrics,Pediatric)   Skin Integrity making progress toward outcome         Problem: Pain, Acute (Adult)  Goal: Identify Related Risk Factors and Signs and Symptoms    04/15/17 0019   Pain, Acute   Related Risk Factors (Acute Pain) persistent pain;infection   Signs and Symptoms (Acute Pain) guarding/abnormal posturing/positioning;questions meaning of pain       Goal: Acceptable Pain Control/Comfort Level    04/15/17 0019   Pain, Acute (Adult)   Acceptable Pain Control/Comfort Level making progress toward outcome

## 2017-04-15 NOTE — BRIEF OP NOTE
INCISION AND DRAINAGE LOWER EXTREMITY  Procedure Note    Reny Elena  4/12/2017 - 4/15/2017    Pre-op Diagnosis:   Cellulitis of foot, left [L03.116]    Post-op Diagnosis:     Post-Op Diagnosis Codes:     * Cellulitis of foot, left [L03.116]    Procedure/CPT® Codes:      Procedure(s):  INCISION AND DRAINAGE LOWER EXTREMITY    Surgeon(s):  Basilio Morris MD    Anesthesia: Choice    Staff:   Circulator: Brii Andino RN  Scrub Person: Lizzy Pugh  Assistant: Curtis Szymanski    Estimated Blood Loss: * No values recorded between 4/15/2017  8:03 AM and 4/15/2017  8:50 AM *  Urine Voided: * No values recorded between 4/15/2017  8:03 AM and 4/15/2017  8:50 AM *    Specimens:                  ID Type Source Tests Collected by Time Destination   1 :  Swab Toe, Left CULTURE, ROUTINE Basilio Morris MD 4/15/2017 0841    A :  Tissue Toe, Left TISSUE EXAM, CULTURE, ROUTINE Basilio Morris MD 4/15/2017 0842            Basilio Morris MD     Date: 4/15/2017  Time: 8:59 AM

## 2017-04-16 LAB
ANION GAP SERPL CALCULATED.3IONS-SCNC: 7.4 MMOL/L (ref 3.6–11.2)
BASOPHILS # BLD AUTO: 0.03 10*3/MM3 (ref 0–0.3)
BASOPHILS NFR BLD AUTO: 0.5 % (ref 0–2)
BUN BLD-MCNC: 11 MG/DL (ref 7–21)
BUN/CREAT SERPL: 9.4 (ref 7–25)
CALCIUM SPEC-SCNC: 8.1 MG/DL (ref 7.7–10)
CHLORIDE SERPL-SCNC: 110 MMOL/L (ref 99–112)
CO2 SERPL-SCNC: 18.6 MMOL/L (ref 24.3–31.9)
CREAT BLD-MCNC: 1.17 MG/DL (ref 0.43–1.29)
DEPRECATED RDW RBC AUTO: 46.4 FL (ref 37–54)
EOSINOPHIL # BLD AUTO: 0.26 10*3/MM3 (ref 0–0.7)
EOSINOPHIL NFR BLD AUTO: 4.5 % (ref 0–5)
ERYTHROCYTE [DISTWIDTH] IN BLOOD BY AUTOMATED COUNT: 13.4 % (ref 11.5–14.5)
GFR SERPL CREATININE-BSD FRML MDRD: 47 ML/MIN/1.73
GLUCOSE BLD-MCNC: 237 MG/DL (ref 70–110)
GLUCOSE BLDC GLUCOMTR-MCNC: 199 MG/DL (ref 70–130)
GLUCOSE BLDC GLUCOMTR-MCNC: 212 MG/DL (ref 70–130)
GLUCOSE BLDC GLUCOMTR-MCNC: 220 MG/DL (ref 70–130)
GLUCOSE BLDC GLUCOMTR-MCNC: 224 MG/DL (ref 70–130)
HCT VFR BLD AUTO: 28.7 % (ref 37–47)
HGB BLD-MCNC: 8.4 G/DL (ref 12–16)
IMM GRANULOCYTES # BLD: 0.03 10*3/MM3 (ref 0–0.03)
IMM GRANULOCYTES NFR BLD: 0.5 % (ref 0–0.5)
LYMPHOCYTES # BLD AUTO: 1.09 10*3/MM3 (ref 1–3)
LYMPHOCYTES NFR BLD AUTO: 18.8 % (ref 21–51)
MCH RBC QN AUTO: 29 PG (ref 27–33)
MCHC RBC AUTO-ENTMCNC: 29.3 G/DL (ref 33–37)
MCV RBC AUTO: 99 FL (ref 80–94)
MONOCYTES # BLD AUTO: 0.85 10*3/MM3 (ref 0.1–0.9)
MONOCYTES NFR BLD AUTO: 14.7 % (ref 0–10)
NEUTROPHILS # BLD AUTO: 3.53 10*3/MM3 (ref 1.4–6.5)
NEUTROPHILS NFR BLD AUTO: 61 % (ref 30–70)
OSMOLALITY SERPL CALC.SUM OF ELEC: 279.1 MOSM/KG (ref 273–305)
PLATELET # BLD AUTO: 140 10*3/MM3 (ref 130–400)
PMV BLD AUTO: 11 FL (ref 6–10)
POTASSIUM BLD-SCNC: 4.1 MMOL/L (ref 3.5–5.3)
RBC # BLD AUTO: 2.9 10*6/MM3 (ref 4.2–5.4)
SODIUM BLD-SCNC: 136 MMOL/L (ref 135–153)
WBC NRBC COR # BLD: 5.79 10*3/MM3 (ref 4.5–12.5)

## 2017-04-16 PROCEDURE — 25010000002 ONDANSETRON PER 1 MG: Performed by: FAMILY MEDICINE

## 2017-04-16 PROCEDURE — 25010000002 ENOXAPARIN PER 10 MG: Performed by: PODIATRIST

## 2017-04-16 PROCEDURE — 94799 UNLISTED PULMONARY SVC/PX: CPT

## 2017-04-16 PROCEDURE — 87040 BLOOD CULTURE FOR BACTERIA: CPT | Performed by: FAMILY MEDICINE

## 2017-04-16 PROCEDURE — 63710000001 INSULIN ASPART PER 5 UNITS: Performed by: FAMILY MEDICINE

## 2017-04-16 PROCEDURE — 82962 GLUCOSE BLOOD TEST: CPT

## 2017-04-16 PROCEDURE — 80048 BASIC METABOLIC PNL TOTAL CA: CPT | Performed by: PODIATRIST

## 2017-04-16 PROCEDURE — 25010000002 VANCOMYCIN PER 500 MG

## 2017-04-16 PROCEDURE — 63710000001 INSULIN DETEMIR PER 5 UNITS: Performed by: FAMILY MEDICINE

## 2017-04-16 PROCEDURE — 25010000002 CEFEPIME

## 2017-04-16 PROCEDURE — 25010000002 PROMETHAZINE PER 50 MG: Performed by: PODIATRIST

## 2017-04-16 PROCEDURE — 85025 COMPLETE CBC W/AUTO DIFF WBC: CPT | Performed by: PODIATRIST

## 2017-04-16 PROCEDURE — 25010000002 MORPHINE PER 10 MG: Performed by: FAMILY MEDICINE

## 2017-04-16 RX ORDER — ONDANSETRON 4 MG/1
4 TABLET, FILM COATED ORAL EVERY 6 HOURS PRN
Status: DISCONTINUED | OUTPATIENT
Start: 2017-04-16 | End: 2017-04-20 | Stop reason: HOSPADM

## 2017-04-16 RX ORDER — ONDANSETRON 2 MG/ML
4 INJECTION INTRAMUSCULAR; INTRAVENOUS EVERY 6 HOURS PRN
Status: DISCONTINUED | OUTPATIENT
Start: 2017-04-16 | End: 2017-04-20 | Stop reason: HOSPADM

## 2017-04-16 RX ORDER — LABETALOL HYDROCHLORIDE 5 MG/ML
20 INJECTION, SOLUTION INTRAVENOUS EVERY 6 HOURS PRN
Status: DISCONTINUED | OUTPATIENT
Start: 2017-04-16 | End: 2017-04-20 | Stop reason: HOSPADM

## 2017-04-16 RX ORDER — SODIUM HYPOCHLORITE 2.5 MG/ML
SOLUTION TOPICAL EVERY 12 HOURS
Status: DISCONTINUED | OUTPATIENT
Start: 2017-04-16 | End: 2017-04-20 | Stop reason: HOSPADM

## 2017-04-16 RX ADMIN — INSULIN ASPART 4 UNITS: 100 INJECTION, SOLUTION INTRAVENOUS; SUBCUTANEOUS at 12:18

## 2017-04-16 RX ADMIN — CEFEPIME 2 G: 2 INJECTION, POWDER, FOR SOLUTION INTRAVENOUS at 20:24

## 2017-04-16 RX ADMIN — SODIUM HYPOCHLORITE: 2.5 SOLUTION TOPICAL at 20:30

## 2017-04-16 RX ADMIN — GABAPENTIN 800 MG: 400 CAPSULE ORAL at 21:24

## 2017-04-16 RX ADMIN — METRONIDAZOLE 500 MG: 500 INJECTION, SOLUTION INTRAVENOUS at 18:00

## 2017-04-16 RX ADMIN — METRONIDAZOLE 500 MG: 500 INJECTION, SOLUTION INTRAVENOUS at 12:29

## 2017-04-16 RX ADMIN — SODIUM CHLORIDE 75 ML/HR: 9 INJECTION, SOLUTION INTRAVENOUS at 08:41

## 2017-04-16 RX ADMIN — VANCOMYCIN HYDROCHLORIDE 1250 MG: 5 INJECTION, POWDER, LYOPHILIZED, FOR SOLUTION INTRAVENOUS at 15:03

## 2017-04-16 RX ADMIN — HYDROCODONE BITARTRATE AND ACETAMINOPHEN 1 TABLET: 10; 325 TABLET ORAL at 21:24

## 2017-04-16 RX ADMIN — TIZANIDINE 4 MG: 4 TABLET ORAL at 00:08

## 2017-04-16 RX ADMIN — ENOXAPARIN SODIUM 40 MG: 40 INJECTION SUBCUTANEOUS at 08:45

## 2017-04-16 RX ADMIN — LABETALOL HYDROCHLORIDE 20 MG: 5 INJECTION INTRAVENOUS at 14:04

## 2017-04-16 RX ADMIN — SODIUM HYPOCHLORITE: 2.5 SOLUTION TOPICAL at 13:19

## 2017-04-16 RX ADMIN — PROMETHAZINE HYDROCHLORIDE 12.5 MG: 25 INJECTION, SOLUTION INTRAMUSCULAR; INTRAVENOUS at 13:19

## 2017-04-16 RX ADMIN — MORPHINE SULFATE 2 MG: 2 INJECTION, SOLUTION INTRAMUSCULAR; INTRAVENOUS at 14:03

## 2017-04-16 RX ADMIN — ONDANSETRON 4 MG: 2 INJECTION, SOLUTION INTRAMUSCULAR; INTRAVENOUS at 21:32

## 2017-04-16 RX ADMIN — INSULIN ASPART 4 UNITS: 100 INJECTION, SOLUTION INTRAVENOUS; SUBCUTANEOUS at 21:24

## 2017-04-16 RX ADMIN — ONDANSETRON 4 MG: 2 INJECTION, SOLUTION INTRAMUSCULAR; INTRAVENOUS at 08:40

## 2017-04-16 RX ADMIN — HYDROCODONE BITARTRATE AND ACETAMINOPHEN 1 TABLET: 7.5; 325 TABLET ORAL at 18:41

## 2017-04-16 RX ADMIN — ATENOLOL 25 MG: 25 TABLET ORAL at 12:18

## 2017-04-16 RX ADMIN — INSULIN ASPART 4 UNITS: 100 INJECTION, SOLUTION INTRAVENOUS; SUBCUTANEOUS at 18:00

## 2017-04-16 RX ADMIN — INSULIN ASPART 2 UNITS: 100 INJECTION, SOLUTION INTRAVENOUS; SUBCUTANEOUS at 08:44

## 2017-04-16 RX ADMIN — SODIUM CHLORIDE 100 ML/HR: 9 INJECTION, SOLUTION INTRAVENOUS at 12:30

## 2017-04-16 RX ADMIN — MORPHINE SULFATE 2 MG: 2 INJECTION, SOLUTION INTRAMUSCULAR; INTRAVENOUS at 03:09

## 2017-04-16 RX ADMIN — TIZANIDINE 4 MG: 4 TABLET ORAL at 17:59

## 2017-04-16 RX ADMIN — METRONIDAZOLE 500 MG: 500 INJECTION, SOLUTION INTRAVENOUS at 03:08

## 2017-04-16 RX ADMIN — INSULIN DETEMIR 25 UNITS: 100 INJECTION, SOLUTION SUBCUTANEOUS at 21:24

## 2017-04-16 RX ADMIN — ACETAMINOPHEN 487.5 MG: 325 TABLET, FILM COATED ORAL at 14:04

## 2017-04-16 RX ADMIN — CEFEPIME 2 G: 2 INJECTION, POWDER, FOR SOLUTION INTRAVENOUS at 08:40

## 2017-04-16 RX ADMIN — PROMETHAZINE HYDROCHLORIDE 12.5 MG: 25 INJECTION, SOLUTION INTRAMUSCULAR; INTRAVENOUS at 04:17

## 2017-04-16 NOTE — PROGRESS NOTES
Pharmacokinetics Service Note:    Ms. lEena continues on day 5 of vancomycin 1 gm q24hrs for her L foot cellulitis.  A 20.5 hour post infusion trough level was reported as 9.7 mg/L last PM.  Her renal function has significantly improved with an estimated ClCr of 47 ml/min today.  The trough is less than desired and consistent with improved clearance.  Will change the dosage to 1.25 gm q18hrs to target troughs = 15-20 mg/L.  Will repeat a trough level at steady state.    Thank you.  Amy Hairston, Pharm.D.  4/16/2017  11:26 AM

## 2017-04-16 NOTE — PROGRESS NOTES
"     LOS: 4 days     Chief Complaint:  Abscess of foot    Subjective     Interval History:     She complains of having nausea this morning and is vomited about 6 times since 2 AM.  She denies any abdominal pain.  She is passing gas.  Blood glucose consistently running around 200 or higher.  No difficulty with urination.      Objective     Vital Signs  /79 (BP Location: Right arm, Patient Position: Lying)  Pulse 78  Temp 98.2 °F (36.8 °C) (Oral)   Resp 18  Ht 66\" (167.6 cm)  Wt 157 lb 9 oz (71.5 kg)  SpO2 96%  BMI 25.43 kg/m2  Intake & Output (last day)       04/15 0701 - 04/16 0700    P.O. 1080    I.V. (mL/kg) 500 (7)    Total Intake(mL/kg) 1580 (22.1)    Net +1580         Unmeasured Urine Occurrence 8 x    Unmeasured Stool Occurrence 5 x            Physical Exam:     General Appearance:    Alert, cooperative, in no acute distress   Head:    Normocephalic, without obvious abnormality, atraumatic   Eyes:            Lids and lashes normal, conjunctivae and sclerae normal, no   icterus, no pallor, corneas clear, PERRLA           Neck:   No adenopathy, supple, trachea midline, no thyromegaly, no   carotid bruit, no JVD   Lungs:     Clear to auscultation,respirations regular, even and                  unlabored    Heart:    Regular rhythm and normal rate, normal S1 and S2, no            murmur, no gallop, no rub, no click   Chest Wall:    No abnormalities observed   Abdomen:     Normal bowel sounds, no masses, no organomegaly, soft        non-tender, non-distended, no guarding, no rebound                tenderness   Extremities:   diminished sensation both feet.  No edema or cyanosis.  Left foot bandaged.     Pulses:   Pulses palpable and equal bilaterally   Skin:   No bleeding, bruising or rash                Results Review:    Lab Results   Component Value Date    WBC 5.79 04/16/2017    HGB 8.4 (L) 04/16/2017    HCT 28.7 (L) 04/16/2017    MCV 99.0 (H) 04/16/2017     04/16/2017       Lab Results "   Component Value Date    GLUCOSE 237 (H) 04/16/2017    BUN 11 04/16/2017    CREATININE 1.17 04/16/2017    EGFRIFNONA 47 (L) 04/16/2017    BCR 9.4 04/16/2017     04/16/2017    K 4.1 04/16/2017     04/16/2017    CO2 18.6 (L) 04/16/2017    CALCIUM 8.1 04/16/2017    ALBUMIN 4.00 04/12/2017    LABIL2 1.3 (L) 04/12/2017    AST 20 04/12/2017    ALT 22 04/12/2017     Lab Results   Component Value Date    INR 0.91 04/12/2017       Glucose   Date Value Ref Range Status   04/15/2017 233 (H) 70 - 130 mg/dL Final   04/15/2017 242 (H) 70 - 130 mg/dL Final   04/15/2017 170 (H) 70 - 130 mg/dL Final   04/15/2017 186 (H) 70 - 130 mg/dL Final   04/14/2017 222 (H) 70 - 130 mg/dL Final          Medication Review:     Current Facility-Administered Medications:   •  acetaminophen (TYLENOL) tablet 487.5 mg, 487.5 mg, Oral, Q4H PRN, Yandel Paulson MD, 487.5 mg at 04/13/17 0849  •  atenolol (TENORMIN) tablet 25 mg, 25 mg, Oral, Daily, Yandel Paulson MD, 25 mg at 04/15/17 0657  •  cefepime (MAXIPIME) 2 g/100 mL 0.9% NS (mbp), 2 g, Intravenous, Q12H, Funmilayo Ventuar, [facility], 2 g at 04/15/17 2126  •  cetirizine (zyrTEC) tablet 10 mg, 10 mg, Oral, Daily, Yandel Paulson MD, 10 mg at 04/14/17 0852  •  dextrose (D50W) solution 25 g, 25 g, Intravenous, Q15 Min PRN, Yandel Paulson MD  •  dextrose (GLUTOSE) oral gel 15 g, 15 g, Oral, Q15 Min PRN, Yandel Paulson MD  •  enoxaparin (LOVENOX) syringe 40 mg, 40 mg, Subcutaneous, Daily, Basilio Morris MD  •  FLUoxetine (PROzac) capsule 20 mg, 20 mg, Oral, Daily, Yandel Paulson MD, 20 mg at 04/14/17 0852  •  gabapentin (NEURONTIN) capsule 800 mg, 800 mg, Oral, Q8H, Yandel Paulson MD, 800 mg at 04/15/17 2126  •  glucagon (human recombinant) (GLUCAGEN DIAGNOSTIC) injection 1 mg, 1 mg, Subcutaneous, Q15 Min PRN, Yandel Paulson MD  •  HYDROcodone-acetaminophen (NORCO)  MG per tablet 1 tablet, 1 tablet, Oral, Q8H, Yandel Paulson MD, 1 tablet at  04/15/17 2126  •  HYDROcodone-acetaminophen (NORCO) 7.5-325 MG per tablet 1 tablet, 1 tablet, Oral, Q4H PRN, Basilio Morris MD  •  hydrOXYzine (ATARAX) tablet 10 mg, 10 mg, Oral, Daily, Yandel Paulson MD, 10 mg at 04/14/17 0852  •  ibuprofen (ADVIL,MOTRIN) tablet 800 mg, 800 mg, Oral, Q6H PRN, Basilio Morris MD  •  insulin aspart (novoLOG) injection 0-9 Units, 0-9 Units, Subcutaneous, 4x Daily AC & at Bedtime, Yandel Paulson MD, 4 Units at 04/15/17 2126  •  insulin detemir (LEVEMIR) injection 22 Units, 22 Units, Subcutaneous, Nightly, Yandel Paulson MD, 22 Units at 04/15/17 2126  •  lidocaine PF 1% (XYLOCAINE) injection 10 mL, 10 mL, Injection, Once, Carlos Piper MD  •  metroNIDAZOLE (FLAGYL) IVPB 500 mg, 500 mg, Intravenous, Q8H, Annalise Garcia PA-C, 500 mg at 04/16/17 0308  •  morphine injection 1 mg, 1 mg, Intravenous, Q4H PRN **AND** naloxone (NARCAN) injection 0.4 mg, 0.4 mg, Intravenous, Q5 Min PRN, Basilio Morris MD  •  morphine injection 2 mg, 2 mg, Intravenous, Q2H PRN, Alonso Ahuja MD, 2 mg at 04/16/17 0309  •  Pharmacy Meds to Bed Consult, , Does not apply, Daily, Kiki Rahman, [facility]  •  promethazine (PHENERGAN) 12.5 mg in sodium chloride 0.9 % 50 mL, 12.5 mg, Intravenous, Q4H PRN, Basilio Morris MD, 12.5 mg at 04/16/17 0417  •  sodium chloride 0.9 % infusion, 75 mL/hr, Intravenous, Continuous, Alonso Ahuja MD, Last Rate: 75 mL/hr at 04/15/17 1254, 75 mL/hr at 04/15/17 1254  •  sodium chloride 0.9 % infusion, 100 mL/hr, Intravenous, Continuous, Basilio oMrris MD, Last Rate: 100 mL/hr at 04/15/17 1513, 100 mL/hr at 04/15/17 1513  •  temazepam (RESTORIL) capsule 30 mg, 30 mg, Oral, Nightly PRN, Alonso Ahuja MD, 30 mg at 04/13/17 2051  •  tiZANidine (ZANAFLEX) tablet 4 mg, 4 mg, Oral, Q6H, Yandel Paulson MD, 4 mg at 04/16/17 0008  •  vancomycin (VANCOCIN) 1,000 mg in sodium chloride 0.9 % 250 mL IVPB, 1,000 mg, Intravenous, Q24H, Alonso Ahuja MD, 1,000 mg at  04/15/17 7610      Assessment/Plan     Active Problems:    Cellulitis of foot, left    Type 2 diabetes mellitus with diabetic peripheral neuropathy and hyperglycemia.  Requiring long-term insulin.    Sliding scale insulin.  Accu-Cheks every before meals and at bedtime.  Continue Levemir but increase to 25 units daily    Cefepime and vancomycin and Flagyl ongoing    Continue hydrocodone for pain control.  She does have when necessary morphine    She's having some vomiting this morning.  She's had when necessary Phenergan.  Give when necessary Zofran.  Her abdominal exam is benign      Samuel Duane Kreis, MD  04/16/17  6:53 AM

## 2017-04-16 NOTE — PLAN OF CARE
Problem: Patient Care Overview (Adult)  Goal: Plan of Care Review  Outcome: Ongoing (interventions implemented as appropriate)    04/16/17 0840   Coping/Psychosocial Response Interventions   Plan Of Care Reviewed With patient         Problem: Cellulitis (Adult)  Goal: Signs and Symptoms of Listed Potential Problems Will be Absent or Manageable (Cellulitis)  Outcome: Ongoing (interventions implemented as appropriate)    04/14/17 0145   Cellulitis   Problems Present (Cellulitis) pain         Problem: Pressure Ulcer Risk (Helio Scale) (Adult,Obstetrics,Pediatric)  Goal: Identify Related Risk Factors and Signs and Symptoms  Outcome: Ongoing (interventions implemented as appropriate)    04/15/17 0019   Pressure Ulcer Risk (Helio Scale)   Related Risk Factors (Pressure Ulcer Risk (Helio Scale)) infection;mobility impaired         Problem: Pain, Acute (Adult)  Goal: Identify Related Risk Factors and Signs and Symptoms  Outcome: Ongoing (interventions implemented as appropriate)    04/15/17 0019   Pain, Acute   Related Risk Factors (Acute Pain) persistent pain;infection   Signs and Symptoms (Acute Pain) guarding/abnormal posturing/positioning;questions meaning of pain

## 2017-04-16 NOTE — PLAN OF CARE
Problem: Patient Care Overview (Adult)  Goal: Plan of Care Review    04/16/17 0041   Coping/Psychosocial Response Interventions   Plan Of Care Reviewed With patient   Patient Care Overview   Progress no change         Problem: Cellulitis (Adult)  Goal: Signs and Symptoms of Listed Potential Problems Will be Absent or Manageable (Cellulitis)    04/14/17 0145   Cellulitis   Problems Assessed (Cellulitis) all   Problems Present (Cellulitis) pain         Problem: Pressure Ulcer Risk (Helio Scale) (Adult,Obstetrics,Pediatric)  Goal: Identify Related Risk Factors and Signs and Symptoms    04/15/17 0019   Pressure Ulcer Risk (Helio Scale)   Related Risk Factors (Pressure Ulcer Risk (Helio Scale)) infection;mobility impaired       Goal: Skin Integrity    04/16/17 0041   Pressure Ulcer Risk (Helio Scale) (Adult,Obstetrics,Pediatric)   Skin Integrity making progress toward outcome         Problem: Pain, Acute (Adult)  Goal: Identify Related Risk Factors and Signs and Symptoms    04/15/17 0019   Pain, Acute   Related Risk Factors (Acute Pain) persistent pain;infection   Signs and Symptoms (Acute Pain) guarding/abnormal posturing/positioning;questions meaning of pain       Goal: Acceptable Pain Control/Comfort Level    04/16/17 0041   Pain, Acute (Adult)   Acceptable Pain Control/Comfort Level making progress toward outcome         Problem: Perioperative Period (Adult)  Goal: Signs and Symptoms of Listed Potential Problems Will be Absent or Manageable (Perioperative Period)    04/16/17 0041   Perioperative Period   Problems Assessed (Perioperative Period) pain;wound complications;infection;situational response   Problems Present (Perioperative Period) pain;wound complications;infection;situational response

## 2017-04-16 NOTE — NURSING NOTE
Spoke with Dr Crockett concerning pt blood pressure elevation and temperature and he states to give labetalol 20 mg q6 prn for systolic blood pressure greater than 160 and to get a set of blood cultures x 2.

## 2017-04-17 ENCOUNTER — APPOINTMENT (OUTPATIENT)
Dept: MRI IMAGING | Facility: HOSPITAL | Age: 57
End: 2017-04-17

## 2017-04-17 LAB
BACTERIA SPEC AEROBE CULT: ABNORMAL
BACTERIA SPEC AEROBE CULT: ABNORMAL
BACTERIA SPEC AEROBE CULT: NORMAL
GLUCOSE BLDC GLUCOMTR-MCNC: 164 MG/DL (ref 70–130)
GLUCOSE BLDC GLUCOMTR-MCNC: 184 MG/DL (ref 70–130)
GLUCOSE BLDC GLUCOMTR-MCNC: 191 MG/DL (ref 70–130)
GLUCOSE BLDC GLUCOMTR-MCNC: 215 MG/DL (ref 70–130)
GRAM STN SPEC: ABNORMAL
GRAM STN SPEC: ABNORMAL

## 2017-04-17 PROCEDURE — 94799 UNLISTED PULMONARY SVC/PX: CPT

## 2017-04-17 PROCEDURE — 82962 GLUCOSE BLOOD TEST: CPT

## 2017-04-17 PROCEDURE — 73718 MRI LOWER EXTREMITY W/O DYE: CPT

## 2017-04-17 PROCEDURE — 73718 MRI LOWER EXTREMITY W/O DYE: CPT | Performed by: RADIOLOGY

## 2017-04-17 PROCEDURE — 25010000002 ONDANSETRON PER 1 MG: Performed by: FAMILY MEDICINE

## 2017-04-17 PROCEDURE — 63710000001 INSULIN DETEMIR PER 5 UNITS: Performed by: FAMILY MEDICINE

## 2017-04-17 PROCEDURE — 63710000001 INSULIN ASPART PER 5 UNITS: Performed by: FAMILY MEDICINE

## 2017-04-17 PROCEDURE — 25010000002 VANCOMYCIN PER 500 MG

## 2017-04-17 PROCEDURE — 25010000003 CEFAZOLIN PER 500 MG: Performed by: PHYSICIAN ASSISTANT

## 2017-04-17 PROCEDURE — 25010000002 CEFEPIME

## 2017-04-17 PROCEDURE — 25010000002 ENOXAPARIN PER 10 MG: Performed by: PODIATRIST

## 2017-04-17 RX ADMIN — METRONIDAZOLE 500 MG: 500 INJECTION, SOLUTION INTRAVENOUS at 02:21

## 2017-04-17 RX ADMIN — VANCOMYCIN HYDROCHLORIDE 1250 MG: 5 INJECTION, POWDER, LYOPHILIZED, FOR SOLUTION INTRAVENOUS at 11:22

## 2017-04-17 RX ADMIN — ATENOLOL 25 MG: 25 TABLET ORAL at 09:38

## 2017-04-17 RX ADMIN — HYDROCODONE BITARTRATE AND ACETAMINOPHEN 1 TABLET: 7.5; 325 TABLET ORAL at 11:31

## 2017-04-17 RX ADMIN — CEFAZOLIN SODIUM 2 G: 2 SOLUTION INTRAVENOUS at 14:15

## 2017-04-17 RX ADMIN — HYDROCODONE BITARTRATE AND ACETAMINOPHEN 1 TABLET: 10; 325 TABLET ORAL at 14:15

## 2017-04-17 RX ADMIN — HYDROXYZINE HYDROCHLORIDE 10 MG: 10 TABLET ORAL at 09:38

## 2017-04-17 RX ADMIN — GABAPENTIN 800 MG: 400 CAPSULE ORAL at 21:28

## 2017-04-17 RX ADMIN — CEFEPIME 2 G: 2 INJECTION, POWDER, FOR SOLUTION INTRAVENOUS at 09:38

## 2017-04-17 RX ADMIN — SODIUM CHLORIDE 100 ML/HR: 9 INJECTION, SOLUTION INTRAVENOUS at 06:37

## 2017-04-17 RX ADMIN — INSULIN ASPART 2 UNITS: 100 INJECTION, SOLUTION INTRAVENOUS; SUBCUTANEOUS at 17:29

## 2017-04-17 RX ADMIN — FLUOXETINE 20 MG: 20 CAPSULE ORAL at 09:38

## 2017-04-17 RX ADMIN — INSULIN ASPART 2 UNITS: 100 INJECTION, SOLUTION INTRAVENOUS; SUBCUTANEOUS at 12:54

## 2017-04-17 RX ADMIN — SODIUM CHLORIDE 100 ML/HR: 9 INJECTION, SOLUTION INTRAVENOUS at 22:12

## 2017-04-17 RX ADMIN — GABAPENTIN 800 MG: 400 CAPSULE ORAL at 14:15

## 2017-04-17 RX ADMIN — INSULIN DETEMIR 25 UNITS: 100 INJECTION, SOLUTION SUBCUTANEOUS at 21:35

## 2017-04-17 RX ADMIN — CEFAZOLIN SODIUM 2 G: 2 SOLUTION INTRAVENOUS at 21:31

## 2017-04-17 RX ADMIN — SODIUM HYPOCHLORITE: 2.5 SOLUTION TOPICAL at 21:00

## 2017-04-17 RX ADMIN — INSULIN ASPART 2 UNITS: 100 INJECTION, SOLUTION INTRAVENOUS; SUBCUTANEOUS at 09:38

## 2017-04-17 RX ADMIN — HYDROCODONE BITARTRATE AND ACETAMINOPHEN 1 TABLET: 10; 325 TABLET ORAL at 21:32

## 2017-04-17 RX ADMIN — ONDANSETRON 4 MG: 4 TABLET, FILM COATED ORAL at 22:12

## 2017-04-17 RX ADMIN — TIZANIDINE 4 MG: 4 TABLET ORAL at 00:17

## 2017-04-17 RX ADMIN — SODIUM HYPOCHLORITE: 2.5 SOLUTION TOPICAL at 09:39

## 2017-04-17 RX ADMIN — INSULIN ASPART 4 UNITS: 100 INJECTION, SOLUTION INTRAVENOUS; SUBCUTANEOUS at 22:12

## 2017-04-17 RX ADMIN — ENOXAPARIN SODIUM 40 MG: 40 INJECTION SUBCUTANEOUS at 09:38

## 2017-04-17 RX ADMIN — ONDANSETRON 4 MG: 2 INJECTION, SOLUTION INTRAMUSCULAR; INTRAVENOUS at 06:45

## 2017-04-17 RX ADMIN — TIZANIDINE 4 MG: 4 TABLET ORAL at 17:28

## 2017-04-17 RX ADMIN — TIZANIDINE 4 MG: 4 TABLET ORAL at 06:37

## 2017-04-17 RX ADMIN — HYDROCODONE BITARTRATE AND ACETAMINOPHEN 1 TABLET: 10; 325 TABLET ORAL at 06:37

## 2017-04-17 RX ADMIN — TIZANIDINE 4 MG: 4 TABLET ORAL at 12:54

## 2017-04-17 RX ADMIN — GABAPENTIN 800 MG: 400 CAPSULE ORAL at 06:37

## 2017-04-17 RX ADMIN — CETIRIZINE HYDROCHLORIDE 10 MG: 10 TABLET ORAL at 09:38

## 2017-04-17 NOTE — PROGRESS NOTES
Reny Elena 57 y.o.   04/17/17    Subjective: Patient currently alert and had episode of nausea vomiting yesterday morning that his seems to have cleared.  Objective: Pulse unstable patient alert wound left foot dressed  Vital Signs (last 72 hrs)       04/13 0700  -  04/14 0659 04/14 0700  -  04/15 0659 04/15 0700  -  04/16 0659 04/16 0700  -  04/17 0622   Most Recent    Temp (°F) 98.6 -  99.7    97.7 -  98.7    97.2 -  98.7    98.1 -  100.1     98.6 (37)    Heart Rate 71 -  76    61 -  98    72 -  90    79 -  97     82    Resp 17 -  18      18    8 -  20      18     18    BP 91/44 -  152/77    118/66 -  148/59    135/73 -  168/83    135/68 -  (!) 207/94     138/82    SpO2 (%)   91      93    92 -  100      91     91        Lab Results (last 72 hours)     Procedure Component Value Units Date/Time    POC Glucose Fingerstick [[id number]]  (Abnormal) Collected:  04/14/17 0719    Specimen:  Blood Updated:  04/14/17 0736     Glucose 216 (H) mg/dL     Narrative:       Meter: [id number] : [id number] Kamaljit Thresa L    POC Glucose Fingerstick [[id number]]  (Normal) Collected:  04/14/17 1206    Specimen:  Blood Updated:  04/14/17 1213     Glucose 115 mg/dL     Narrative:       Meter: [id number] : [id number] Kamaljit Bowensa L    C-reactive Protein [[id number]]  (Abnormal) Collected:  04/14/17 1429    Specimen:  Blood Updated:  04/14/17 1540     C-Reactive Protein 30.05 (H) mg/dL     POC Glucose Fingerstick [[id number]]  (Abnormal) Collected:  04/14/17 1709    Specimen:  Blood Updated:  04/14/17 1715     Glucose 206 (H) mg/dL     Narrative:       Meter: [id number] : [id number] Kamaljit Thresa L    POC Glucose Fingerstick [[id number]]  (Abnormal) Collected:  04/14/17 2210    Specimen:  Blood Updated:  04/14/17 2214     Glucose 222 (H) mg/dL     Narrative:       Meter: [id number] : [id number] stephani piedra    Sedimentation Rate [[id number]]  (Abnormal) Collected:  04/15/17 0034    Specimen:  Blood Updated:  04/15/17 0116      Sed Rate 81 (H) mm/hr     Basic Metabolic Panel [[id number]]  (Abnormal) Collected:  04/15/17 0034    Specimen:  Blood Updated:  04/15/17 0124     Glucose 218 (H) mg/dL      BUN 13 mg/dL      Creatinine 1.38 (H) mg/dL      Sodium 135 mmol/L      Potassium 4.2 mmol/L      Chloride 107 mmol/L      CO2 17.6 (L) mmol/L      Calcium 8.7 mg/dL      eGFR Non African Amer 39 (L) mL/min/1.73      BUN/Creatinine Ratio 9.4     Anion Gap 10.4 mmol/L     Narrative:       GFR Normal >60  Chronic Kidney Disease <60  Kidney Failure <15    Osmolality, Calculated [[id number]]  (Normal) Collected:  04/15/17 0034    Specimen:  Blood Updated:  04/15/17 0124     Osmolality Calc 276.9 mOsm/kg     POC Glucose Fingerstick [[id number]]  (Abnormal) Collected:  04/15/17 0746    Specimen:  Blood Updated:  04/15/17 0749     Glucose 186 (H) mg/dL     Narrative:       Physician Notified Meter: [id number] : [id number] Becca MICHELLE    POC Glucose Fingerstick [[id number]]  (Abnormal) Collected:  04/15/17 1147    Specimen:  Blood Updated:  04/15/17 1155     Glucose 170 (H) mg/dL     Narrative:       Meter: [id number] : [id number] [employer]    POC Glucose Fingerstick [[id number]]  (Abnormal) Collected:  04/15/17 1703    Specimen:  Blood Updated:  04/15/17 1708     Glucose 242 (H) mg/dL     Narrative:       Meter: [id number] : [id number] [employer]    POC Glucose Fingerstick [[id number]]  (Abnormal) Collected:  04/15/17 2043    Specimen:  Blood Updated:  04/15/17 2046     Glucose 233 (H) mg/dL     Narrative:       Meter: [id number] : [id number] Venkata Majano    Vancomycin, Trough [[id number]]  (Normal) Collected:  04/15/17 2003    Specimen:  Blood Updated:  04/15/17 2054     Vancomycin Trough 9.70 mcg/mL     CBC & Differential [[id number]] Collected:  04/16/17 0032    Specimen:  Blood Updated:  04/16/17 0203    Narrative:       The following orders were created for panel order CBC & Differential.  Procedure                                Abnormality         Status                     ---------                               -----------         ------                     CBC Auto Differential[[id number]]         Abnormal            Final result                 Please view results for these tests on the individual orders.    CBC Auto Differential [[id number]]  (Abnormal) Collected:  04/16/17 0032    Specimen:  Blood Updated:  04/16/17 0203     WBC 5.79 10*3/mm3      RBC 2.90 (L) 10*6/mm3      Hemoglobin 8.4 (L) g/dL      Hematocrit 28.7 (L) %      MCV 99.0 (H) fL      MCH 29.0 pg      MCHC 29.3 (L) g/dL      RDW 13.4 %      RDW-SD 46.4 fl      MPV 11.0 (H) fL      Platelets 140 10*3/mm3      Neutrophil % 61.0 %      Lymphocyte % 18.8 (L) %      Monocyte % 14.7 (H) %      Eosinophil % 4.5 %      Basophil % 0.5 %      Immature Grans % 0.5 %      Neutrophils, Absolute 3.53 10*3/mm3      Lymphocytes, Absolute 1.09 10*3/mm3      Monocytes, Absolute 0.85 10*3/mm3      Eosinophils, Absolute 0.26 10*3/mm3      Basophils, Absolute 0.03 10*3/mm3      Immature Grans, Absolute 0.03 10*3/mm3     Basic Metabolic Panel [[id number]]  (Abnormal) Collected:  04/16/17 0032    Specimen:  Blood Updated:  04/16/17 0236     Glucose 237 (H) mg/dL      BUN 11 mg/dL      Creatinine 1.17 mg/dL      Sodium 136 mmol/L      Potassium 4.1 mmol/L      Chloride 110 mmol/L      CO2 18.6 (L) mmol/L      Calcium 8.1 mg/dL      eGFR Non African Amer 47 (L) mL/min/1.73      BUN/Creatinine Ratio 9.4     Anion Gap 7.4 mmol/L     Narrative:       GFR Normal >60  Chronic Kidney Disease <60  Kidney Failure <15    Osmolality, Calculated [[id number]]  (Normal) Collected:  04/16/17 0032    Specimen:  Blood Updated:  04/16/17 0236     Osmolality Calc 279.1 mOsm/kg     Culture, Routine [[id number]]  (Normal) Collected:  04/15/17 1118    Specimen:  Tissue from Toe, Left Updated:  04/16/17 0704     Culture Culture in progress     Gram Stain Result Occasional Gram positive cocci in pairs    Culture, Routine  [[id number]]  (Abnormal) Collected:  04/15/17 0841    Specimen:  Swab from Toe, Left Updated:  04/16/17 0717     Culture Moderate growth (3+) Gram positive cocci, consistent with Staph spp (A)     Gram Stain Result Occasional Gram positive cocci in pairs    POC Glucose Fingerstick [[id number]]  (Abnormal) Collected:  04/16/17 0719    Specimen:  Blood Updated:  04/16/17 0744     Glucose 199 (H) mg/dL     Narrative:       Meter: [id number] : [id number] [employer]    Wound Culture [[id number]]  (Abnormal) Collected:  04/12/17 2047    Specimen:  Wound from Foot, Left Updated:  04/16/17 1106     Wound Culture Scant growth (1+) Gram positive cocci, consistent with Staph spp (A)     Gram Stain Result No WBCs or organisms seen    POC Glucose Fingerstick [[id number]]  (Abnormal) Collected:  04/16/17 1158    Specimen:  Blood Updated:  04/16/17 1207     Glucose 224 (H) mg/dL     Narrative:       Meter: [id number] : [id number] [employer]    Blood Culture [[id number]]  (Normal) Collected:  04/14/17 1429    Specimen:  Blood from Arm, Right Updated:  04/16/17 1501     Blood Culture No growth at 2 days    Blood Culture [[id number]]  (Normal) Collected:  04/14/17 1519    Specimen:  Blood from Arm, Right Updated:  04/16/17 1701     Blood Culture No growth at 2 days    POC Glucose Fingerstick [[id number]]  (Abnormal) Collected:  04/16/17 1706    Specimen:  Blood Updated:  04/16/17 1713     Glucose 220 (H) mg/dL     Narrative:       Meter: [id number] : [id number] [employer]    POC Glucose Fingerstick [[id number]]  (Abnormal) Collected:  04/16/17 2046    Specimen:  Blood Updated:  04/16/17 2049     Glucose 212 (H) mg/dL     Narrative:       Meter: [id number] : [id number] Venkata Majano    Blood Culture [[id number]]  (Normal) Collected:  04/12/17 2047    Specimen:  Blood from Arm, Right Updated:  04/16/17 2201     Blood Culture No growth at 4 days    Blood Culture [[id number]]  (Normal) Collected:  04/12/17 2122     Specimen:  Blood from Arm, Left Updated:  04/17/17 0001     Blood Culture No growth at 4 days    Blood Culture [[id number]]  (Normal) Collected:  04/16/17 1407    Specimen:  Blood from Arm, Right Updated:  04/17/17 0307     Blood Culture No growth at less than 24 hours    Blood Culture [[id number]]  (Normal) Collected:  04/16/17 1522    Specimen:  Blood from Arm, Right Updated:  04/17/17 0501     Blood Culture No growth at less than 24 hours        Imaging Results (last 24 hours)     ** No results found for the last 24 hours. **        Assessment:Active Problems:    Cellulitis of foot, left   Excess left foot  Plan: Patient states MRI ordered for evaluation of osteomyelitis

## 2017-04-17 NOTE — PLAN OF CARE
Problem: Patient Care Overview (Adult)  Goal: Plan of Care Review    04/17/17 0133   Coping/Psychosocial Response Interventions   Plan Of Care Reviewed With patient;spouse   Patient Care Overview   Progress improving         Problem: Cellulitis (Adult)  Goal: Signs and Symptoms of Listed Potential Problems Will be Absent or Manageable (Cellulitis)    04/17/17 0133   Cellulitis   Problems Assessed (Cellulitis) pain;infection;situational response   Problems Present (Cellulitis) pain;infection;situational response         Problem: Pressure Ulcer Risk (Helio Scale) (Adult,Obstetrics,Pediatric)  Goal: Identify Related Risk Factors and Signs and Symptoms    04/15/17 0019   Pressure Ulcer Risk (Helio Scale)   Related Risk Factors (Pressure Ulcer Risk (Helio Scale)) infection;mobility impaired       Goal: Skin Integrity    04/17/17 0133   Pressure Ulcer Risk (Helio Scale) (Adult,Obstetrics,Pediatric)   Skin Integrity making progress toward outcome         Problem: Pain, Acute (Adult)  Goal: Identify Related Risk Factors and Signs and Symptoms    04/15/17 0019   Pain, Acute   Related Risk Factors (Acute Pain) persistent pain;infection   Signs and Symptoms (Acute Pain) guarding/abnormal posturing/positioning;questions meaning of pain       Goal: Acceptable Pain Control/Comfort Level    04/17/17 0133   Pain, Acute (Adult)   Acceptable Pain Control/Comfort Level making progress toward outcome         Problem: Perioperative Period (Adult)  Goal: Signs and Symptoms of Listed Potential Problems Will be Absent or Manageable (Perioperative Period)    04/16/17 0041   Perioperative Period   Problems Assessed (Perioperative Period) pain;wound complications;infection;situational response   Problems Present (Perioperative Period) pain;wound complications;infection;situational response

## 2017-04-17 NOTE — PLAN OF CARE
Problem: Patient Care Overview (Adult)  Goal: Plan of Care Review  Outcome: Ongoing (interventions implemented as appropriate)    04/17/17 1705   Coping/Psychosocial Response Interventions   Plan Of Care Reviewed With patient   Patient Care Overview   Progress improving       Goal: Adult Individualization and Mutuality  Outcome: Ongoing (interventions implemented as appropriate)  Goal: Discharge Needs Assessment  Outcome: Ongoing (interventions implemented as appropriate)    Problem: Cellulitis (Adult)  Goal: Signs and Symptoms of Listed Potential Problems Will be Absent or Manageable (Cellulitis)  Outcome: Ongoing (interventions implemented as appropriate)    Problem: Pressure Ulcer Risk (Helio Scale) (Adult,Obstetrics,Pediatric)  Goal: Identify Related Risk Factors and Signs and Symptoms  Outcome: Ongoing (interventions implemented as appropriate)  Goal: Skin Integrity  Outcome: Ongoing (interventions implemented as appropriate)    04/17/17 1705   Pressure Ulcer Risk (Helio Scale) (Adult,Obstetrics,Pediatric)   Skin Integrity making progress toward outcome         Problem: Pain, Acute (Adult)  Goal: Identify Related Risk Factors and Signs and Symptoms  Outcome: Ongoing (interventions implemented as appropriate)  Goal: Acceptable Pain Control/Comfort Level  Outcome: Ongoing (interventions implemented as appropriate)    04/17/17 1705   Pain, Acute (Adult)   Acceptable Pain Control/Comfort Level making progress toward outcome

## 2017-04-17 NOTE — PAYOR COMM NOTE
"[facility]  NPI:[id number]    Utilization Review  Contact: Adele Oropeza RN  Phone: [phone]  Fax:[phone]    CLINICAL UPDATE  AUTH # [id number]  ADDITIONAL DAYS NEEDED  Reny Merino (57 y.o. Female)     Date of Birth Social Security Number Address Home Phone MRN    1960  [address] [place] [address] [phone] [id number]    [employer] Marital Status          None Single       Admission Date Admission Type Admitting Provider Attending Provider Department, Room/Bed    4/12/17 Emergency Yandel Paulson MD Watts, Yandel Tay MD [address], [facility]8/    Discharge Date Discharge Disposition Discharge Destination                      Attending Provider: Yandel Paulson MD     Allergies:  Codeine, Latex, Penicillins, Sulfa Antibiotics    Isolation:  None   Infection:  None   Code Status:  FULL    Ht:  66\" (167.6 cm)   Wt:  157 lb 9 oz (71.5 kg)    Admission Cmt:  None   Principal Problem:  None                Active Insurance as of 4/12/2017     Primary Coverage     Payor Plan Insurance Group Employer/Plan Group    [employer] [place] [employer]      Payor Plan Address Payor Plan Phone Number Effective From Effective To    [address] [address] [phone] 4/12/2017     [place] [address]       Subscriber Name Subscriber Birth Date Member ID       RENY MERION [id number]                 Emergency Contacts      (Rel.) Home Phone Work Phone Mobile Phone    Liza Oliva (Daughter) [phone] -- --               Physician Progress Notes (last 72 hours) (Notes from 4/14/2017  8:16 AM through 4/17/2017  8:16 AM)      Ricci Rubio MD at 4/14/2017 10:53 AM  Version 2 of 2           I have personally seen and examined the patient today and discussed overnight interval progress and pertinent issues with nursing staff.    Subjective    The patient was very confused overnight and pulled out her IVs.  Foot reveals large blister " "with extending erythema to the dorsum with bruising, swelling, and tenderness as well as warmth.  Nurse at bedside    Review of Systems    Constitutional: no fever, chills and night sweats. No appetite change or unexpected weight change. No fatigue.  Eyes: no eye drainage, itching or redness.  HEENT: no mouth sores, dysphagia or nose bleed.  Respiratory: no for shortness of breath, cough or production of sputum.  Cardiovascular: no chest pain, no palpitations, no orthopnea.  Gastrointestinal: no nausea, vomiting or diarrhea. No abdominal pain, hematemesis or rectal bleeding.  Genitourinary: no dysuria or polyuria.  Hematologic/lymphatic: no lymph node abnormalities, no easy bruising or easy bleeding.  Musculoskeletal: Positive for foot pain  Skin: Positive for foot drainage  Neurological: no loss of consciousness, no seizure, no headache.  Behavioral/Psych: no depression or suicidal ideation.  Endocrine: no hot flashes.  Immunologic: negative.      History taken from: patient chart RN      Vital Signs    /77 (BP Location: Right arm, Patient Position: Lying)  Pulse 72  Temp 98.6 °F (37 °C) (Oral)   Resp 18  Ht 66\" (167.6 cm)  Wt 157 lb 9 oz (71.5 kg)  SpO2 91%  BMI 25.43 kg/m2    Temp:  [98.6 °F (37 °C)-99.7 °F (37.6 °C)] 98.6 °F (37 °C)      Intake/Output Summary (Last 24 hours) at 04/14/17 1053  Last data filed at 04/14/17 0737   Gross per 24 hour   Intake             2210 ml   Output              600 ml   Net             1610 ml     Intake & Output (last 3 days)       04/11 0701 - 04/12 0700 04/12 0701 - 04/13 0700 04/13 0701 - 04/14 0700 04/14 0701 - 04/15 0700    P.O.  0 1860     IV Piggyback   250 100    Total Intake(mL/kg)  0 (0) 2110 (29.5) 100 (1.4)    Urine (mL/kg/hr)   600 (0.3)     Total Output     600      Net   0 +1510 +100            Unmeasured Urine Occurrence  1 x 6 x 1 x    Unmeasured Stool Occurrence  0 x 0 x           Physical exam:    General Appearance:    Slightly confused, Alert, " cooperative, in no acute distress, nurse at bedside   Head:    Normocephalic, without obvious abnormality, atraumatic   Eyes:            Lids and lashes normal, conjunctivae and sclerae normal, no   icterus, no pallor, corneas clear, PERRLA   Ears:    Ears appear intact with no abnormalities noted   Throat:   No oral lesions, no thrush, oral mucosa moist   Neck:   No adenopathy, supple, trachea midline, no thyromegaly, no   carotid bruit, no JVD   Back:     No tenderness to percussion or palpation, range of motion   normal   Lungs:     Clear to auscultation,respirations regular, even and unlabored. No wheezing, no ronchi and no crackles.    Heart:    Regular rhythm and normal rate, normal S1 and S2, no            murmur, no gallop, no rub, no click   Chest Wall:    No abnormalities observed   Abdomen:     Normal bowel sounds, no masses, no organomegaly, soft        non-tender, non-distended, no guarding, no rebound                tenderness   Rectal:     Deferred   Extremities:   Foot reveals large blister with extending erythema to the dorsum with bruising, swelling, and tenderness as well as warmth.     Pulses:   Pulses palpable and equal bilaterally   Skin:  Foot reveals large blister with extending erythema to the dorsum with bruising, swelling, and tenderness as well as warmth.     Lymph nodes:   No palpable adenopathy   Neurologic:   Awake, alert, slightly confused           Results:      Results from last 7 days  Lab Units 04/12/17 2047   WBC 10*3/mm3 8.22     Lab Results   Component Value Date    NEUTROABS 5.84 04/12/2017         Results from last 7 days  Lab Units 04/12/17 2047   CREATININE mg/dL 1.78*     Results Review:    I have personally reviewed laboratory data, culture results, radiology studies and antimicrobial therapy.    Hospital Medications (active)       Dose Frequency Start End    acetaminophen (TYLENOL) tablet 487.5 mg 487.5 mg Every 4 Hours PRN 4/13/2017     Sig - Route: Take 1.5 tablets  by mouth Every 4 (Four) Hours As Needed for Fever. - Oral    ALPRAZolam (XANAX) tablet 0.5 mg 0.5 mg Once 4/14/2017 4/14/2017    Sig - Route: Take 1 tablet by mouth 1 (One) Time. - Oral    atenolol (TENORMIN) tablet 25 mg 25 mg Daily 4/13/2017     Sig - Route: Take 1 tablet by mouth Daily. - Oral    cefepime (MAXIPIME) 2 g/100 mL 0.9% NS (mbp) 2 g Every 12 Hours 4/14/2017     Sig - Route: Infuse 100 mL into a venous catheter Every 12 (Twelve) Hours. - Intravenous    cetirizine (zyrTEC) tablet 10 mg 10 mg Daily 4/13/2017     Sig - Route: Take 1 tablet by mouth Daily. - Oral    dextrose (D50W) solution 25 g 25 g Every 15 Minutes PRN 4/13/2017     Sig - Route: Infuse 50 mL into a venous catheter Every 15 (Fifteen) Minutes As Needed for Low Blood Sugar (Blood Sugar Less Than 70, Patient Has IV Access - Unresponsive, NPO or Unable To Safely Swallow). - Intravenous    dextrose (GLUTOSE) oral gel 15 g 15 g Every 15 Minutes PRN 4/13/2017     Sig - Route: Take 15 g by mouth Every 15 (Fifteen) Minutes As Needed for Low Blood Sugar (Blood Sugar Less Than 70, Patient Alert, Is Not NPO & Can Safely Swallow). - Oral    FLUoxetine (PROzac) capsule 20 mg 20 mg Daily 4/13/2017     Sig - Route: Take 1 capsule by mouth Daily. - Oral    gabapentin (NEURONTIN) capsule 800 mg 800 mg Every 8 Hours Scheduled 4/13/2017     Sig - Route: Take 2 capsules by mouth Every 8 (Eight) Hours. - Oral    glucagon (human recombinant) (GLUCAGEN DIAGNOSTIC) injection 1 mg 1 mg Every 15 Minutes PRN 4/13/2017     Sig - Route: Inject 1 mg under the skin Every 15 (Fifteen) Minutes As Needed (Blood Glucose Less Than 70 - Patient Without IV Access - Unresponsive, NPO or Unable To Safely Swallow). - Subcutaneous    HYDROcodone-acetaminophen (NORCO)  MG per tablet 1 tablet 1 tablet Every 8 Hours Scheduled 4/13/2017     Sig - Route: Take 1 tablet by mouth Every 8 (Eight) Hours. - Oral    hydrOXYzine (ATARAX) tablet 10 mg 10 mg Daily 4/13/2017     Sig -  Route: Take 1 tablet by mouth Daily. - Oral    insulin aspart (novoLOG) injection 0-9 Units 0-9 Units 4 Times Daily Before Meals & Nightly 4/13/2017     Sig - Route: Inject 0-9 Units under the skin 4 (Four) Times a Day Before Meals & at Bedtime. - Subcutaneous    insulin detemir (LEVEMIR) injection 22 Units 22 Units Nightly 4/13/2017     Sig - Route: Inject 22 Units under the skin Every Night. - Subcutaneous    lidocaine PF 1% (XYLOCAINE) injection 10 mL 10 mL Once 4/12/2017     Sig - Route: Inject 10 mL as directed 1 (One) Time. - Injection    Cosign for Ordering: Accepted by Carlos Piper MD on 4/12/2017 10:11 PM    metroNIDAZOLE (FLAGYL) IVPB 500 mg 500 mg Every 8 Hours 4/13/2017     Sig - Route: Infuse 100 mL into a venous catheter Every 8 (Eight) Hours. - Intravenous    Cosign for Ordering: Accepted by Ricci Rubio MD on 4/13/2017 12:50 PM    morphine injection 2 mg 2 mg Every 2 Hours PRN 4/13/2017 4/23/2017    Sig - Route: Infuse 1 mL into a venous catheter Every 2 (Two) Hours As Needed for Severe Pain (7-10). - Intravenous    Pharmacy Meds to Bed Consult  Daily 4/13/2017     Sig - Route: Daily. - Does not apply    sodium chloride 0.9 % infusion 75 mL/hr Continuous 4/13/2017     Sig - Route: Infuse 75 mL/hr into a venous catheter Continuous. - Intravenous    temazepam (RESTORIL) capsule 30 mg 30 mg Nightly PRN 4/13/2017     Sig - Route: Take 2 capsules by mouth At Night As Needed for Sleep. - Oral    tiZANidine (ZANAFLEX) tablet 4 mg 4 mg Every 6 Hours Scheduled 4/13/2017     Sig - Route: Take 1 tablet by mouth Every 6 (Six) Hours. - Oral    vancomycin (VANCOCIN) 1,000 mg in sodium chloride 0.9 % 250 mL IVPB 1,000 mg Every 24 Hours 4/13/2017     Sig - Route: Infuse 1,000 mg into a venous catheter Daily. - Intravenous    cefepime (MAXIPIME) 2 g/100 mL 0.9% NS (mbp) (Discontinued) 2 g Every 8 Hours 4/13/2017 4/14/2017    Sig - Route: Infuse 100 mL into a venous catheter Every 8 (Eight) Hours. -  Intravenous            Cultures:    Blood Culture   Date Value Ref Range Status   04/12/2017 No growth at 24 hours  Preliminary   04/12/2017 No growth at 24 hours  Preliminary     Wound Culture   Date Value Ref Range Status   04/12/2017 No growth at 24 hours  Preliminary       PROBLEM LIST:    Patient Active Problem List   Diagnosis   • Cellulitis of foot, left       Assessment/Plan     ASSESSMENT:    1. Abscess of the foot     PLAN:     The patient was very confused overnight and pulled out her IVs.  Foot reveals large blister with extending erythema to the dorsum with bruising, swelling, and tenderness as well as warmth.  The patient most likely has underlying osteomyelitis and is going to require surgical evaluation and since she has seen Dr. ferguson in the past a consult was put in for him.    Unfortunately the patient carries a poor limb prognosis in the setting of possible abscess to the foot. Would recommend to continue pseudomonal and anaerobic coverage along with vancomycin until cultures are available. Would continue cefepime 2 g IV every 12 hours and initiated Flagyl 500 mg IV every 8 hours. We'll continue follow culture results and adjust antibiotic therapy appropriately.       Patients findings and recommendations were discussed with patient and nursing staff    Code Status: Full Code    Annalise Garcia PA-C  04/14/17  10:53 AM    Physician Attestation:    I have personally seen and examined the patient. I reviewed the patient's data including history of present illness, review of systems, physical examination, assessment and treatment plan and agree with findings above. The assessment and plan are my own.  I have reviewed and edited the note above after discussing the findings with Annalise Garcia PA-C.    Ricci Rubio MD  Infectious Diseases  04/14/17  2:19 PM       Electronically signed by Ricci Rubio MD at 4/14/2017  2:19 PM      Annalise Garcia PA-C at 4/14/2017 10:53 AM  Version 1  "of 2           I have personally seen and examined the patient today and discussed overnight interval progress and pertinent issues with nursing staff.    Subjective    The patient was very confused overnight and pulled out her IVs.  Foot reveals large blister with extending erythema to the dorsum with bruising, swelling, and tenderness as well as warmth.  Nurse at bedside    Review of Systems    Constitutional: no fever, chills and night sweats. No appetite change or unexpected weight change. No fatigue.  Eyes: no eye drainage, itching or redness.  HEENT: no mouth sores, dysphagia or nose bleed.  Respiratory: no for shortness of breath, cough or production of sputum.  Cardiovascular: no chest pain, no palpitations, no orthopnea.  Gastrointestinal: no nausea, vomiting or diarrhea. No abdominal pain, hematemesis or rectal bleeding.  Genitourinary: no dysuria or polyuria.  Hematologic/lymphatic: no lymph node abnormalities, no easy bruising or easy bleeding.  Musculoskeletal: Positive for foot pain  Skin: Positive for foot drainage  Neurological: no loss of consciousness, no seizure, no headache.  Behavioral/Psych: no depression or suicidal ideation.  Endocrine: no hot flashes.  Immunologic: negative.      History taken from: patient chart RN      Vital Signs    /77 (BP Location: Right arm, Patient Position: Lying)  Pulse 72  Temp 98.6 °F (37 °C) (Oral)   Resp 18  Ht 66\" (167.6 cm)  Wt 157 lb 9 oz (71.5 kg)  SpO2 91%  BMI 25.43 kg/m2    Temp:  [98.6 °F (37 °C)-99.7 °F (37.6 °C)] 98.6 °F (37 °C)      Intake/Output Summary (Last 24 hours) at 04/14/17 1053  Last data filed at 04/14/17 0737   Gross per 24 hour   Intake             2210 ml   Output              600 ml   Net             1610 ml     Intake & Output (last 3 days)       04/11 0701 - 04/12 0700 04/12 0701 - 04/13 0700 04/13 0701 - 04/14 0700 04/14 0701 - 04/15 0700    P.O.  0 1860     IV Piggyback   250 100    Total Intake(mL/kg)  0 (0) 2110 (29.5) " 100 (1.4)    Urine (mL/kg/hr)   600 (0.3)     Total Output     600      Net   0 +1510 +100            Unmeasured Urine Occurrence  1 x 6 x 1 x    Unmeasured Stool Occurrence  0 x 0 x           Physical exam:    General Appearance:    Slightly confused, Alert, cooperative, in no acute distress, nurse at bedside   Head:    Normocephalic, without obvious abnormality, atraumatic   Eyes:            Lids and lashes normal, conjunctivae and sclerae normal, no   icterus, no pallor, corneas clear, PERRLA   Ears:    Ears appear intact with no abnormalities noted   Throat:   No oral lesions, no thrush, oral mucosa moist   Neck:   No adenopathy, supple, trachea midline, no thyromegaly, no   carotid bruit, no JVD   Back:     No tenderness to percussion or palpation, range of motion   normal   Lungs:     Clear to auscultation,respirations regular, even and unlabored. No wheezing, no ronchi and no crackles.    Heart:    Regular rhythm and normal rate, normal S1 and S2, no            murmur, no gallop, no rub, no click   Chest Wall:    No abnormalities observed   Abdomen:     Normal bowel sounds, no masses, no organomegaly, soft        non-tender, non-distended, no guarding, no rebound                tenderness   Rectal:     Deferred   Extremities:   Foot reveals large blister with extending erythema to the dorsum with bruising, swelling, and tenderness as well as warmth.     Pulses:   Pulses palpable and equal bilaterally   Skin:  Foot reveals large blister with extending erythema to the dorsum with bruising, swelling, and tenderness as well as warmth.     Lymph nodes:   No palpable adenopathy   Neurologic:   Awake, alert, slightly confused           Results:      Results from last 7 days  Lab Units 04/12/17 2047   WBC 10*3/mm3 8.22     Lab Results   Component Value Date    NEUTROABS 5.84 04/12/2017         Results from last 7 days  Lab Units 04/12/17 2047   CREATININE mg/dL 1.78*     Results Review:    I have personally  reviewed laboratory data, culture results, radiology studies and antimicrobial therapy.    Hospital Medications (active)       Dose Frequency Start End    acetaminophen (TYLENOL) tablet 487.5 mg 487.5 mg Every 4 Hours PRN 4/13/2017     Sig - Route: Take 1.5 tablets by mouth Every 4 (Four) Hours As Needed for Fever. - Oral    ALPRAZolam (XANAX) tablet 0.5 mg 0.5 mg Once 4/14/2017 4/14/2017    Sig - Route: Take 1 tablet by mouth 1 (One) Time. - Oral    atenolol (TENORMIN) tablet 25 mg 25 mg Daily 4/13/2017     Sig - Route: Take 1 tablet by mouth Daily. - Oral    cefepime (MAXIPIME) 2 g/100 mL 0.9% NS (mbp) 2 g Every 12 Hours 4/14/2017     Sig - Route: Infuse 100 mL into a venous catheter Every 12 (Twelve) Hours. - Intravenous    cetirizine (zyrTEC) tablet 10 mg 10 mg Daily 4/13/2017     Sig - Route: Take 1 tablet by mouth Daily. - Oral    dextrose (D50W) solution 25 g 25 g Every 15 Minutes PRN 4/13/2017     Sig - Route: Infuse 50 mL into a venous catheter Every 15 (Fifteen) Minutes As Needed for Low Blood Sugar (Blood Sugar Less Than 70, Patient Has IV Access - Unresponsive, NPO or Unable To Safely Swallow). - Intravenous    dextrose (GLUTOSE) oral gel 15 g 15 g Every 15 Minutes PRN 4/13/2017     Sig - Route: Take 15 g by mouth Every 15 (Fifteen) Minutes As Needed for Low Blood Sugar (Blood Sugar Less Than 70, Patient Alert, Is Not NPO & Can Safely Swallow). - Oral    FLUoxetine (PROzac) capsule 20 mg 20 mg Daily 4/13/2017     Sig - Route: Take 1 capsule by mouth Daily. - Oral    gabapentin (NEURONTIN) capsule 800 mg 800 mg Every 8 Hours Scheduled 4/13/2017     Sig - Route: Take 2 capsules by mouth Every 8 (Eight) Hours. - Oral    glucagon (human recombinant) (GLUCAGEN DIAGNOSTIC) injection 1 mg 1 mg Every 15 Minutes PRN 4/13/2017     Sig - Route: Inject 1 mg under the skin Every 15 (Fifteen) Minutes As Needed (Blood Glucose Less Than 70 - Patient Without IV Access - Unresponsive, NPO or Unable To Safely Swallow). -  Subcutaneous    HYDROcodone-acetaminophen (NORCO)  MG per tablet 1 tablet 1 tablet Every 8 Hours Scheduled 4/13/2017     Sig - Route: Take 1 tablet by mouth Every 8 (Eight) Hours. - Oral    hydrOXYzine (ATARAX) tablet 10 mg 10 mg Daily 4/13/2017     Sig - Route: Take 1 tablet by mouth Daily. - Oral    insulin aspart (novoLOG) injection 0-9 Units 0-9 Units 4 Times Daily Before Meals & Nightly 4/13/2017     Sig - Route: Inject 0-9 Units under the skin 4 (Four) Times a Day Before Meals & at Bedtime. - Subcutaneous    insulin detemir (LEVEMIR) injection 22 Units 22 Units Nightly 4/13/2017     Sig - Route: Inject 22 Units under the skin Every Night. - Subcutaneous    lidocaine PF 1% (XYLOCAINE) injection 10 mL 10 mL Once 4/12/2017     Sig - Route: Inject 10 mL as directed 1 (One) Time. - Injection    Cosign for Ordering: Accepted by Carlos Piper MD on 4/12/2017 10:11 PM    metroNIDAZOLE (FLAGYL) IVPB 500 mg 500 mg Every 8 Hours 4/13/2017     Sig - Route: Infuse 100 mL into a venous catheter Every 8 (Eight) Hours. - Intravenous    Cosign for Ordering: Accepted by Ricci Rubio MD on 4/13/2017 12:50 PM    morphine injection 2 mg 2 mg Every 2 Hours PRN 4/13/2017 4/23/2017    Sig - Route: Infuse 1 mL into a venous catheter Every 2 (Two) Hours As Needed for Severe Pain (7-10). - Intravenous    Pharmacy Meds to Bed Consult  Daily 4/13/2017     Sig - Route: Daily. - Does not apply    sodium chloride 0.9 % infusion 75 mL/hr Continuous 4/13/2017     Sig - Route: Infuse 75 mL/hr into a venous catheter Continuous. - Intravenous    temazepam (RESTORIL) capsule 30 mg 30 mg Nightly PRN 4/13/2017     Sig - Route: Take 2 capsules by mouth At Night As Needed for Sleep. - Oral    tiZANidine (ZANAFLEX) tablet 4 mg 4 mg Every 6 Hours Scheduled 4/13/2017     Sig - Route: Take 1 tablet by mouth Every 6 (Six) Hours. - Oral    vancomycin (VANCOCIN) 1,000 mg in sodium chloride 0.9 % 250 mL IVPB 1,000 mg Every 24 Hours 4/13/2017      Sig - Route: Infuse 1,000 mg into a venous catheter Daily. - Intravenous    cefepime (MAXIPIME) 2 g/100 mL 0.9% NS (mbp) (Discontinued) 2 g Every 8 Hours 4/13/2017 4/14/2017    Sig - Route: Infuse 100 mL into a venous catheter Every 8 (Eight) Hours. - Intravenous            Cultures:    Blood Culture   Date Value Ref Range Status   04/12/2017 No growth at 24 hours  Preliminary   04/12/2017 No growth at 24 hours  Preliminary     Wound Culture   Date Value Ref Range Status   04/12/2017 No growth at 24 hours  Preliminary       PROBLEM LIST:    Patient Active Problem List   Diagnosis   • Cellulitis of foot, left       Assessment/Plan     ASSESSMENT:    1. Abscess of the foot     PLAN:     The patient was very confused overnight and pulled out her IVs.  Foot reveals large blister with extending erythema to the dorsum with bruising, swelling, and tenderness as well as warmth.  The patient most likely has underlying osteomyelitis and is going to require surgical evaluation and since she has seen Dr. ferguson in the past a consult was put in for him.    Unfortunately the patient carries a poor limb prognosis in the setting of possible abscess to the foot. Would recommend to continue pseudomonal and anaerobic coverage along with vancomycin until cultures are available. Would continue cefepime 2 g IV every 12 hours and initiated Flagyl 500 mg IV every 8 hours. We'll continue follow culture results and adjust antibiotic therapy appropriately.       Patients findings and recommendations were discussed with patient and nursing staff    Code Status: Full Code    Annalise Garcia PA-C  04/14/17  10:53 AM       Electronically signed by Annalise Garcia PA-C at 4/14/2017 10:57 AM      Samuel Duane Kreis, MD at 4/15/2017 12:23 PM  Version 1 of 1              LOS: 3 days     Chief Complaint:  Abscess of foot    Subjective     Interval History:     She is awake and alert today.  She is postoperative.  This morning she went incision  "and drainage of the subcutaneous abscess left foot with debridement of necrotic tissue.  She's having some pain postoperatively.  She's not having any fevers and denies any chills.  Blood sugars are running between 170 and 222 over the past 12 hours.  She is on Levemir and sliding scale insulin.  No episodes of hypoglycemia.      Objective     Vital Signs  /73 (BP Location: Right arm, Patient Position: Lying)  Pulse 75  Temp 97.9 °F (36.6 °C) (Oral)   Resp 20  Ht 66\" (167.6 cm)  Wt 157 lb 9 oz (71.5 kg)  SpO2 92%  BMI 25.43 kg/m2  Intake & Output (last day)       04/14 0701 - 04/15 0700 04/15 0701 - 04/16 0700    P.O. 1080     I.V. (mL/kg) 1000 (14) 500 (7)    IV Piggyback 100     Total Intake(mL/kg) 2180 (30.5) 500 (7)    Urine (mL/kg/hr)      Total Output        Net +2180 +500          Unmeasured Urine Occurrence 5 x     Unmeasured Stool Occurrence 0 x             Physical Exam:     General Appearance:    Alert, cooperative, in no acute distress   Head:    Normocephalic, without obvious abnormality, atraumatic   Eyes:            Lids and lashes normal, conjunctivae and sclerae normal, no   icterus, no pallor, corneas clear, PERRLA   Ears:    Ears appear intact with no abnormalities noted   Throat:   No oral lesions, no thrush, oral mucosa moist   Neck:   No adenopathy, supple, trachea midline, no thyromegaly, no   carotid bruit, no JVD   Lungs:     Clear to auscultation,respirations regular, even and                  unlabored    Heart:    Regular rhythm and normal rate, normal S1 and S2, no            murmur, no gallop, no rub, no click   Chest Wall:    No abnormalities observed   Abdomen:     Normal bowel sounds, no masses, no organomegaly, soft        non-tender, non-distended, no guarding, no rebound                tenderness   Extremities:   diminished sensation both feet.  No edema or cyanosis.  Foot bandaged.     Pulses:   Pulses palpable and equal bilaterally   Skin:   No bleeding, bruising or " rash   Lymph nodes:   No palpable adenopathy   Neurologic:   Cranial nerves 2 - 12 grossly intact, sensation intact, DTR       present and equal bilaterally        Results Review:    Lab Results   Component Value Date    WBC 8.22 04/12/2017    HGB 10.5 (L) 04/12/2017    HCT 32.6 (L) 04/12/2017    MCV 92.4 04/12/2017     (L) 04/12/2017       Lab Results   Component Value Date    GLUCOSE 218 (H) 04/15/2017    BUN 13 04/15/2017    CREATININE 1.38 (H) 04/15/2017    EGFRIFNONA 39 (L) 04/15/2017    BCR 9.4 04/15/2017     04/15/2017    K 4.2 04/15/2017     04/15/2017    CO2 17.6 (L) 04/15/2017    CALCIUM 8.7 04/15/2017    ALBUMIN 4.00 04/12/2017    LABIL2 1.3 (L) 04/12/2017    AST 20 04/12/2017    ALT 22 04/12/2017     Lab Results   Component Value Date    INR 0.91 04/12/2017       Glucose   Date Value Ref Range Status   04/15/2017 170 (H) 70 - 130 mg/dL Final   04/15/2017 186 (H) 70 - 130 mg/dL Final   04/14/2017 222 (H) 70 - 130 mg/dL Final   04/14/2017 206 (H) 70 - 130 mg/dL Final   04/14/2017 115 70 - 130 mg/dL Final          Medication Review:     Current Facility-Administered Medications:   •  acetaminophen (TYLENOL) tablet 487.5 mg, 487.5 mg, Oral, Q4H PRN, Yandel Paulson MD, 487.5 mg at 04/13/17 0849  •  atenolol (TENORMIN) tablet 25 mg, 25 mg, Oral, Daily, Yandel Paulson MD, 25 mg at 04/15/17 0657  •  cefepime (MAXIPIME) 2 g/100 mL 0.9% NS (mbp), 2 g, Intravenous, Q12H, Funmilayo Ventura [facility], 2 g at 04/14/17 2122  •  cetirizine (zyrTEC) tablet 10 mg, 10 mg, Oral, Daily, Yandel Paulson MD, 10 mg at 04/14/17 0852  •  dextrose (D50W) solution 25 g, 25 g, Intravenous, Q15 Min PRN, Yandel Paulson MD  •  dextrose (GLUTOSE) oral gel 15 g, 15 g, Oral, Q15 Min PRN, Yandel Paulson MD  •  [START ON 4/16/2017] enoxaparin (LOVENOX) syringe 40 mg, 40 mg, Subcutaneous, Daily, Basilio Morris MD  •  FLUoxetine (PROzac) capsule 20 mg, 20 mg, Oral, Daily, Yandel Paulson MD, 20 mg  at 04/14/17 0852  •  gabapentin (NEURONTIN) capsule 800 mg, 800 mg, Oral, Q8H, Yandel Paulson MD, 800 mg at 04/14/17 2221  •  glucagon (human recombinant) (GLUCAGEN DIAGNOSTIC) injection 1 mg, 1 mg, Subcutaneous, Q15 Min PRN, Yandel Paulson MD  •  HYDROcodone-acetaminophen (NORCO)  MG per tablet 1 tablet, 1 tablet, Oral, Q8H, Yandel Paulson MD, 1 tablet at 04/14/17 2221  •  HYDROcodone-acetaminophen (NORCO) 7.5-325 MG per tablet 1 tablet, 1 tablet, Oral, Q4H PRN, Basilio Morris MD  •  hydrOXYzine (ATARAX) tablet 10 mg, 10 mg, Oral, Daily, Yandel Paulson MD, 10 mg at 04/14/17 0852  •  ibuprofen (ADVIL,MOTRIN) tablet 800 mg, 800 mg, Oral, Q6H PRN, Basilio Morris MD  •  insulin aspart (novoLOG) injection 0-9 Units, 0-9 Units, Subcutaneous, 4x Daily AC & at Bedtime, Yandel Paulson MD, 4 Units at 04/14/17 2221  •  insulin detemir (LEVEMIR) injection 22 Units, 22 Units, Subcutaneous, Nightly, Yandel Paulson MD, 22 Units at 04/14/17 2222  •  lidocaine PF 1% (XYLOCAINE) injection 10 mL, 10 mL, Injection, Once, Carlos Piper MD  •  metroNIDAZOLE (FLAGYL) IVPB 500 mg, 500 mg, Intravenous, Q8H, Annalise Garcia PA-C, 500 mg at 04/15/17 0346  •  morphine injection 1 mg, 1 mg, Intravenous, Q4H PRN **AND** naloxone (NARCAN) injection 0.4 mg, 0.4 mg, Intravenous, Q5 Min PRN, Basilio Morris MD  •  morphine injection 2 mg, 2 mg, Intravenous, Q2H PRN, Alonso Ahuja MD, 2 mg at 04/15/17 0346  •  Pharmacy Meds to Bed Consult, , Does not apply, Daily, Kiki Rahman, [facility]  •  promethazine (PHENERGAN) 12.5 mg in sodium chloride 0.9 % 50 mL, 12.5 mg, Intravenous, Q4H PRN, Basilio Morris MD  •  sodium chloride 0.9 % infusion, 75 mL/hr, Intravenous, Continuous, Alonso Ahuja MD, Last Rate: 75 mL/hr at 04/14/17 1923, 75 mL/hr at 04/14/17 1923  •  sodium chloride 0.9 % infusion, 100 mL/hr, Intravenous, Continuous, Basilio Morris MD  •  temazepam (RESTORIL) capsule 30 mg, 30 mg, Oral, Nightly  "PRN, Alonso Ahuja MD, 30 mg at 04/13/17 2051  •  tiZANidine (ZANAFLEX) tablet 4 mg, 4 mg, Oral, Q6H, Yandel Paulson MD, 4 mg at 04/14/17 7087  •  vancomycin (VANCOCIN) 1,000 mg in sodium chloride 0.9 % 250 mL IVPB, 1,000 mg, Intravenous, Q24H, Alonso Ahuja MD, 1,000 mg at 04/14/17 2222      Assessment/Plan     Active Problems:    Cellulitis of foot, left    Type 2 diabetes mellitus with diabetic peripheral neuropathy.  Requiring long-term insulin.    Sliding scale insulin.  Accu-Cheks every before meals and at bedtime.  Continue Levemir    Cefepime and vancomycin and Flagyl ongoing    Continue hydrocodone for pain control.  She does have when necessary morphine      Samuel Duane Kreis, MD  04/15/17  12:23 PM       Electronically signed by Samuel Duane Kreis, MD at 4/15/2017 12:26 PM      Samuel Duane Kreis, MD at 4/16/2017  6:53 AM  Version 1 of 1              LOS: 4 days     Chief Complaint:  Abscess of foot    Subjective     Interval History:     She complains of having nausea this morning and is vomited about 6 times since 2 AM.  She denies any abdominal pain.  She is passing gas.  Blood glucose consistently running around 200 or higher.  No difficulty with urination.      Objective     Vital Signs  /79 (BP Location: Right arm, Patient Position: Lying)  Pulse 78  Temp 98.2 °F (36.8 °C) (Oral)   Resp 18  Ht 66\" (167.6 cm)  Wt 157 lb 9 oz (71.5 kg)  SpO2 96%  BMI 25.43 kg/m2  Intake & Output (last day)       04/15 0701 - 04/16 0700    P.O. 1080    I.V. (mL/kg) 500 (7)    Total Intake(mL/kg) 1580 (22.1)    Net +1580         Unmeasured Urine Occurrence 8 x    Unmeasured Stool Occurrence 5 x            Physical Exam:     General Appearance:    Alert, cooperative, in no acute distress   Head:    Normocephalic, without obvious abnormality, atraumatic   Eyes:            Lids and lashes normal, conjunctivae and sclerae normal, no   icterus, no pallor, corneas clear, PERRLA           Neck:   No " adenopathy, supple, trachea midline, no thyromegaly, no   carotid bruit, no JVD   Lungs:     Clear to auscultation,respirations regular, even and                  unlabored    Heart:    Regular rhythm and normal rate, normal S1 and S2, no            murmur, no gallop, no rub, no click   Chest Wall:    No abnormalities observed   Abdomen:     Normal bowel sounds, no masses, no organomegaly, soft        non-tender, non-distended, no guarding, no rebound                tenderness   Extremities:   diminished sensation both feet.  No edema or cyanosis.  Left foot bandaged.     Pulses:   Pulses palpable and equal bilaterally   Skin:   No bleeding, bruising or rash                Results Review:    Lab Results   Component Value Date    WBC 5.79 04/16/2017    HGB 8.4 (L) 04/16/2017    HCT 28.7 (L) 04/16/2017    MCV 99.0 (H) 04/16/2017     04/16/2017       Lab Results   Component Value Date    GLUCOSE 237 (H) 04/16/2017    BUN 11 04/16/2017    CREATININE 1.17 04/16/2017    EGFRIFNONA 47 (L) 04/16/2017    BCR 9.4 04/16/2017     04/16/2017    K 4.1 04/16/2017     04/16/2017    CO2 18.6 (L) 04/16/2017    CALCIUM 8.1 04/16/2017    ALBUMIN 4.00 04/12/2017    LABIL2 1.3 (L) 04/12/2017    AST 20 04/12/2017    ALT 22 04/12/2017     Lab Results   Component Value Date    INR 0.91 04/12/2017       Glucose   Date Value Ref Range Status   04/15/2017 233 (H) 70 - 130 mg/dL Final   04/15/2017 242 (H) 70 - 130 mg/dL Final   04/15/2017 170 (H) 70 - 130 mg/dL Final   04/15/2017 186 (H) 70 - 130 mg/dL Final   04/14/2017 222 (H) 70 - 130 mg/dL Final          Medication Review:     Current Facility-Administered Medications:   •  acetaminophen (TYLENOL) tablet 487.5 mg, 487.5 mg, Oral, Q4H PRN, Yandel Paulson MD, 487.5 mg at 04/13/17 0849  •  atenolol (TENORMIN) tablet 25 mg, 25 mg, Oral, Daily, Yandel Paulson MD, 25 mg at 04/15/17 0657  •  cefepime (MAXIPIME) 2 g/100 mL 0.9% NS (mbp), 2 g, Intravenous, Q12H, Funmilayo  Bria Ventura, [facility], 2 g at 04/15/17 2126  •  cetirizine (zyrTEC) tablet 10 mg, 10 mg, Oral, Daily, Yandel Paulson MD, 10 mg at 04/14/17 0852  •  dextrose (D50W) solution 25 g, 25 g, Intravenous, Q15 Min PRN, Yandel Paulson MD  •  dextrose (GLUTOSE) oral gel 15 g, 15 g, Oral, Q15 Min PRN, Yandel Paulson MD  •  enoxaparin (LOVENOX) syringe 40 mg, 40 mg, Subcutaneous, Daily, Basilio Morris MD  •  FLUoxetine (PROzac) capsule 20 mg, 20 mg, Oral, Daily, Yandel Paulson MD, 20 mg at 04/14/17 0852  •  gabapentin (NEURONTIN) capsule 800 mg, 800 mg, Oral, Q8H, Yandel Paulson MD, 800 mg at 04/15/17 2126  •  glucagon (human recombinant) (GLUCAGEN DIAGNOSTIC) injection 1 mg, 1 mg, Subcutaneous, Q15 Min PRN, Yandel Paulson MD  •  HYDROcodone-acetaminophen (NORCO)  MG per tablet 1 tablet, 1 tablet, Oral, Q8H, Yandel Paulson MD, 1 tablet at 04/15/17 2126  •  HYDROcodone-acetaminophen (NORCO) 7.5-325 MG per tablet 1 tablet, 1 tablet, Oral, Q4H PRN, Basilio Morris MD  •  hydrOXYzine (ATARAX) tablet 10 mg, 10 mg, Oral, Daily, Yandel Paulson MD, 10 mg at 04/14/17 0852  •  ibuprofen (ADVIL,MOTRIN) tablet 800 mg, 800 mg, Oral, Q6H PRN, Basilio Morris MD  •  insulin aspart (novoLOG) injection 0-9 Units, 0-9 Units, Subcutaneous, 4x Daily AC & at Bedtime, Yandel Paulson MD, 4 Units at 04/15/17 2126  •  insulin detemir (LEVEMIR) injection 22 Units, 22 Units, Subcutaneous, Nightly, Yandel Paulson MD, 22 Units at 04/15/17 2126  •  lidocaine PF 1% (XYLOCAINE) injection 10 mL, 10 mL, Injection, Once, Carlos Piper MD  •  metroNIDAZOLE (FLAGYL) IVPB 500 mg, 500 mg, Intravenous, Q8H, Annalise Garcia PA-C, 500 mg at 04/16/17 0308  •  morphine injection 1 mg, 1 mg, Intravenous, Q4H PRN **AND** naloxone (NARCAN) injection 0.4 mg, 0.4 mg, Intravenous, Q5 Min PRN, Basilio Morris MD  •  morphine injection 2 mg, 2 mg, Intravenous, Q2H PRN, Alonso Ahuja MD, 2 mg at 04/16/17 0309  •  Pharmacy  Meds to Bed Consult, , Does not apply, Daily, Kiki Rahman, [facility]  •  promethazine (PHENERGAN) 12.5 mg in sodium chloride 0.9 % 50 mL, 12.5 mg, Intravenous, Q4H PRN, Basilio Morris MD, 12.5 mg at 04/16/17 0417  •  sodium chloride 0.9 % infusion, 75 mL/hr, Intravenous, Continuous, Alonso Ahuja MD, Last Rate: 75 mL/hr at 04/15/17 1254, 75 mL/hr at 04/15/17 1254  •  sodium chloride 0.9 % infusion, 100 mL/hr, Intravenous, Continuous, Basilio Morris MD, Last Rate: 100 mL/hr at 04/15/17 1513, 100 mL/hr at 04/15/17 1513  •  temazepam (RESTORIL) capsule 30 mg, 30 mg, Oral, Nightly PRN, Alonso Ahuja MD, 30 mg at 04/13/17 2051  •  tiZANidine (ZANAFLEX) tablet 4 mg, 4 mg, Oral, Q6H, Yandel Paulson MD, 4 mg at 04/16/17 0008  •  vancomycin (VANCOCIN) 1,000 mg in sodium chloride 0.9 % 250 mL IVPB, 1,000 mg, Intravenous, Q24H, Alonso Ahuja MD, 1,000 mg at 04/15/17 2218      Assessment/Plan     Active Problems:    Cellulitis of foot, left    Type 2 diabetes mellitus with diabetic peripheral neuropathy and hyperglycemia.  Requiring long-term insulin.    Sliding scale insulin.  Accu-Cheks every before meals and at bedtime.  Continue Levemir but increase to 25 units daily    Cefepime and vancomycin and Flagyl ongoing    Continue hydrocodone for pain control.  She does have when necessary morphine    She's having some vomiting this morning.  She's had when necessary Phenergan.  Give when necessary Zofran.  Her abdominal exam is benign      Samuel Duane Kreis, MD  04/16/17  6:53 AM       Electronically signed by Samuel Duane Kreis, MD at 4/16/2017  6:55 AM      Yandel Paulson MD at 4/17/2017  6:22 AM  Version 1 of 1         Reny S Kassy 57 y.o.   04/17/17    Subjective: Patient currently alert and had episode of nausea vomiting yesterday morning that his seems to have cleared.  Objective: Pulse unstable patient alert wound left foot dressed  Vital Signs (last 72 hrs)       04/13 0700  -  04/14 0659 04/14 0700  -   04/15 0659 04/15 0700  -  04/16 0659 04/16 0700  -  04/17 0622   Most Recent    Temp (°F) 98.6 -  99.7    97.7 -  98.7    97.2 -  98.7    98.1 -  100.1     98.6 (37)    Heart Rate 71 -  76    61 -  98    72 -  90    79 -  97     82    Resp 17 -  18      18    8 -  20      18     18    BP 91/44 -  152/77    118/66 -  148/59    135/73 -  168/83    135/68 -  (!) 207/94     138/82    SpO2 (%)   91      93    92 -  100      91     91        Lab Results (last 72 hours)     Procedure Component Value Units Date/Time    POC Glucose Fingerstick [[id number]]  (Abnormal) Collected:  04/14/17 0719    Specimen:  Blood Updated:  04/14/17 0736     Glucose 216 (H) mg/dL     Narrative:       Meter: [id number] : [id number] Mari Thresa L    POC Glucose Fingerstick [[id number]]  (Normal) Collected:  04/14/17 1206    Specimen:  Blood Updated:  04/14/17 1213     Glucose 115 mg/dL     Narrative:       Meter: [id number] : [id number] Mari Thresa L    C-reactive Protein [[id number]]  (Abnormal) Collected:  04/14/17 1429    Specimen:  Blood Updated:  04/14/17 1540     C-Reactive Protein 30.05 (H) mg/dL     POC Glucose Fingerstick [[id number]]  (Abnormal) Collected:  04/14/17 1709    Specimen:  Blood Updated:  04/14/17 1715     Glucose 206 (H) mg/dL     Narrative:       Meter: [id number] : [id number] Mari Thresa L    POC Glucose Fingerstick [[id number]]  (Abnormal) Collected:  04/14/17 2210    Specimen:  Blood Updated:  04/14/17 2214     Glucose 222 (H) mg/dL     Narrative:       Meter: [id number] : [id number] stephani piedra    Sedimentation Rate [[id number]]  (Abnormal) Collected:  04/15/17 0034    Specimen:  Blood Updated:  04/15/17 0116     Sed Rate 81 (H) mm/hr     Basic Metabolic Panel [[id number]]  (Abnormal) Collected:  04/15/17 0034    Specimen:  Blood Updated:  04/15/17 0124     Glucose 218 (H) mg/dL      BUN 13 mg/dL      Creatinine 1.38 (H) mg/dL      Sodium 135 mmol/L      Potassium 4.2 mmol/L      Chloride 107 mmol/L      CO2  17.6 (L) mmol/L      Calcium 8.7 mg/dL      eGFR Non African Amer 39 (L) mL/min/1.73      BUN/Creatinine Ratio 9.4     Anion Gap 10.4 mmol/L     Narrative:       GFR Normal >60  Chronic Kidney Disease <60  Kidney Failure <15    Osmolality, Calculated [[id number]]  (Normal) Collected:  04/15/17 0034    Specimen:  Blood Updated:  04/15/17 0124     Osmolality Calc 276.9 mOsm/kg     POC Glucose Fingerstick [[id number]]  (Abnormal) Collected:  04/15/17 0746    Specimen:  Blood Updated:  04/15/17 0749     Glucose 186 (H) mg/dL     Narrative:       Physician Notified Meter: [id number] : [id number] Becca MICHELLE    POC Glucose Fingerstick [[id number]]  (Abnormal) Collected:  04/15/17 1147    Specimen:  Blood Updated:  04/15/17 1155     Glucose 170 (H) mg/dL     Narrative:       Meter: [id number] : [id number] [employer]    POC Glucose Fingerstick [[id number]]  (Abnormal) Collected:  04/15/17 1703    Specimen:  Blood Updated:  04/15/17 1708     Glucose 242 (H) mg/dL     Narrative:       Meter: [id number] : [id number] [employer]    POC Glucose Fingerstick [[id number]]  (Abnormal) Collected:  04/15/17 2043    Specimen:  Blood Updated:  04/15/17 2046     Glucose 233 (H) mg/dL     Narrative:       Meter: [id number] : [id number] Venkata Majano    Vancomycin, Trough [[id number]]  (Normal) Collected:  04/15/17 2003    Specimen:  Blood Updated:  04/15/17 2054     Vancomycin Trough 9.70 mcg/mL     CBC & Differential [[id number]] Collected:  04/16/17 0032    Specimen:  Blood Updated:  04/16/17 0203    Narrative:       The following orders were created for panel order CBC & Differential.  Procedure                               Abnormality         Status                     ---------                               -----------         ------                     CBC Auto Differential[[id number]]         Abnormal            Final result                 Please view results for these tests on the individual orders.    CBC Auto  Differential [[id number]]  (Abnormal) Collected:  04/16/17 0032    Specimen:  Blood Updated:  04/16/17 0203     WBC 5.79 10*3/mm3      RBC 2.90 (L) 10*6/mm3      Hemoglobin 8.4 (L) g/dL      Hematocrit 28.7 (L) %      MCV 99.0 (H) fL      MCH 29.0 pg      MCHC 29.3 (L) g/dL      RDW 13.4 %      RDW-SD 46.4 fl      MPV 11.0 (H) fL      Platelets 140 10*3/mm3      Neutrophil % 61.0 %      Lymphocyte % 18.8 (L) %      Monocyte % 14.7 (H) %      Eosinophil % 4.5 %      Basophil % 0.5 %      Immature Grans % 0.5 %      Neutrophils, Absolute 3.53 10*3/mm3      Lymphocytes, Absolute 1.09 10*3/mm3      Monocytes, Absolute 0.85 10*3/mm3      Eosinophils, Absolute 0.26 10*3/mm3      Basophils, Absolute 0.03 10*3/mm3      Immature Grans, Absolute 0.03 10*3/mm3     Basic Metabolic Panel [[id number]]  (Abnormal) Collected:  04/16/17 0032    Specimen:  Blood Updated:  04/16/17 0236     Glucose 237 (H) mg/dL      BUN 11 mg/dL      Creatinine 1.17 mg/dL      Sodium 136 mmol/L      Potassium 4.1 mmol/L      Chloride 110 mmol/L      CO2 18.6 (L) mmol/L      Calcium 8.1 mg/dL      eGFR Non African Amer 47 (L) mL/min/1.73      BUN/Creatinine Ratio 9.4     Anion Gap 7.4 mmol/L     Narrative:       GFR Normal >60  Chronic Kidney Disease <60  Kidney Failure <15    Osmolality, Calculated [[id number]]  (Normal) Collected:  04/16/17 0032    Specimen:  Blood Updated:  04/16/17 0236     Osmolality Calc 279.1 mOsm/kg     Culture, Routine [[id number]]  (Normal) Collected:  04/15/17 1118    Specimen:  Tissue from Toe, Left Updated:  04/16/17 0704     Culture Culture in progress     Gram Stain Result Occasional Gram positive cocci in pairs    Culture, Routine [[id number]]  (Abnormal) Collected:  04/15/17 0841    Specimen:  Swab from Toe, Left Updated:  04/16/17 0717     Culture Moderate growth (3+) Gram positive cocci, consistent with Staph spp (A)     Gram Stain Result Occasional Gram positive cocci in pairs    POC Glucose Fingerstick [[id number]]   (Abnormal) Collected:  04/16/17 0719    Specimen:  Blood Updated:  04/16/17 0744     Glucose 199 (H) mg/dL     Narrative:       Meter: [id number] : [id number] Sulema Smith    Wound Culture [[id number]]  (Abnormal) Collected:  04/12/17 2047    Specimen:  Wound from Foot, Left Updated:  04/16/17 1106     Wound Culture Scant growth (1+) Gram positive cocci, consistent with Staph spp (A)     Gram Stain Result No WBCs or organisms seen    POC Glucose Fingerstick [[id number]]  (Abnormal) Collected:  04/16/17 1158    Specimen:  Blood Updated:  04/16/17 1207     Glucose 224 (H) mg/dL     Narrative:       Meter: [id number] : [id number] Sulema Smith    Blood Culture [[id number]]  (Normal) Collected:  04/14/17 1429    Specimen:  Blood from Arm, Right Updated:  04/16/17 1501     Blood Culture No growth at 2 days    Blood Culture [[id number]]  (Normal) Collected:  04/14/17 1519    Specimen:  Blood from Arm, Right Updated:  04/16/17 1701     Blood Culture No growth at 2 days    POC Glucose Fingerstick [[id number]]  (Abnormal) Collected:  04/16/17 1706    Specimen:  Blood Updated:  04/16/17 1713     Glucose 220 (H) mg/dL     Narrative:       Meter: [id number] : [id number] Sulema Sarah    POC Glucose Fingerstick [[id number]]  (Abnormal) Collected:  04/16/17 2046    Specimen:  Blood Updated:  04/16/17 2049     Glucose 212 (H) mg/dL     Narrative:       Meter: [id number] : [id number] Venkata Majano    Blood Culture [[id number]]  (Normal) Collected:  04/12/17 2047    Specimen:  Blood from Arm, Right Updated:  04/16/17 2201     Blood Culture No growth at 4 days    Blood Culture [[id number]]  (Normal) Collected:  04/12/17 2127    Specimen:  Blood from Arm, Left Updated:  04/17/17 0001     Blood Culture No growth at 4 days    Blood Culture [[id number]]  (Normal) Collected:  04/16/17 1407    Specimen:  Blood from Arm, Right Updated:  04/17/17 0307     Blood Culture No growth at less than 24 hours    Blood Culture [[id number]]   (Normal) Collected:  04/16/17 1522    Specimen:  Blood from Arm, Right Updated:  04/17/17 0501     Blood Culture No growth at less than 24 hours        Imaging Results (last 24 hours)     ** No results found for the last 24 hours. **        Assessment:Active Problems:    Cellulitis of foot, left   Excess left foot  Plan: Patient states MRI ordered for evaluation of osteomyelitis     Electronically signed by Yandel Paulson MD at 4/17/2017  6:23 AM           Consult Notes (last 72 hours) (Notes from 4/14/2017  8:16 AM through 4/17/2017  8:16 AM)      Basilio Morris MD at 4/14/2017  2:17 PM  Version 1 of 1     Consult Orders:    1. Inpatient Consult to Podiatry [[id number]] ordered by Ricci Rubio MD at 04/14/17 0851                  PODIATRIC SURGERY CONSULTATION REPORT      Referring Provider: Lorene  Reason for Consultation: Left foot infection Gas Gangrene, Abcess rule out OM      Principal problem: <principal problem not specified>    Subjective .        History of present illness:   Ms. Elena, 57 y.o. years old female with past medical history significant for diabetes,hypertension presented to [facility] on 4/12/2017 for left foot pain and blister formation with redness and swelling after she punctured her foot while playing with her grandson a few weeks ago. She developed a fever of 101.5 and worsening symptoms which prompted her to come to the ED. Denies vomiting or fever. Podiatric Surgery consultation was requested for lower extremity management. History taken from: mayo, Vibha RN, Case was discussed with patient and nursing staff     Review of Systems     Constitutional: See history of present illness, confusion   Eyes: no eye drainage, itching or redness.  HEENT: no mouth sores, dysphagia or nose bleed.  Respiratory: no for shortness of breath, cough or production of sputum.  Cardiovascular: no chest pain, no palpitations, no orthopnea.  Gastrointestinal: no  "nausea, vomiting or diarrhea. No abdominal pain, hematemesis or rectal bleeding.  Genitourinary: no dysuria or polyuria.  Hematologic/lymphatic: no lymph node abnormalities, no easy bruising or easy bleeding.  Musculoskeletal: no muscle or joint pain.  Skin: See history of present illness  Neurological: no loss of consciousness, no seizure, no headache.  Behavioral/Psych: no depression or suicidal ideation.  Endocrine: no hot flashes.  Immunologic: negative.     Past Medical History      Medical History         Past Medical History:   Diagnosis Date   • Arthritis     • Depression     • Diabetes mellitus     • Hypertension              Past Surgical History      Surgical History          Past Surgical History:   Procedure Laterality Date   • BACK SURGERY       • CATARACT EXTRACTION         Left    •  SECTION       • DENTAL PROCEDURE       • HYSTERECTOMY       • TOE AMPUTATION       • TUBAL ABDOMINAL LIGATION                Family History           Family History   Problem Relation Age of Onset   • Heart disease Mother     • Cancer Mother     • COPD Mother           Social History          Social History   Substance Use Topics   • Smoking status: Never Smoker   • Smokeless tobacco: None   • Alcohol use No         Allergies     Codeine; Penicillins; and Sulfa antibiotics        Objective          /51 (BP Location: Right arm, Patient Position: Lying)  Pulse 76  Temp (!) 101.5 °F (38.6 °C) (Oral)  Resp 18  Ht 66\" (167.6 cm)  Wt 157 lb 9 oz (71.5 kg)  SpO2 97%  BMI 25.43 kg/m2     Temp: [98 °F (36.7 °C)-101.5 °F (38.6 °C)] 101.5 °F (38.6 °C)           Intake/Output Summary (Last 24 hours) at 17 0973  Last data filed at 17 0300    Gross per 24 hour   Intake 0 ml   Output 0 ml   Net 0 ml            Physical Exam:      General Appearance:  Alert but seems confusedLe, cooperative, in no acute distress   Head:  Normocephalic, without obvious abnormality, atraumatic   Eyes:      Lids and lashes " normal, conjunctivae and sclerae normal, no icterus, no pallor, corneas clear, PERRLA   Ears:  Ears appear intact with no abnormalities noted   Throat: No oral lesions, no thrush, oral mucosa moist   Neck: No adenopathy, supple, trachea midline, no thyromegaly, no carotid bruit, no JVD   Back:  No tenderness to percussion or palpation, range of motion normal   Lungs:  Clear to auscultation,respirations regular, even and unlabored. No wheezing, no ronchi and no crackles.   Heart:  Regular rhythm and normal rate, normal S1 and S2, no murmur, no gallop, no rub, no click   Chest Wall:  No abnormalities observed   Abdomen:  Normal bowel sounds, no masses, no organomegaly, soft non-tender, non-distended, no guarding, no rebound tenderness   Rectal:  Deferred   Extremities: Left foot necrotic sloughing tissue entire first mtpj dorsum, medial and plantar with extensive erythema, edema, warmth. Mild crepitus with Range of motion first mtpj. Erythema extends tip hallux over 2nd and 3rd mtpj.  Clinical Gas Gangrene. Plantar 2mm puncture wound with drainaing purulent fluid.    Pulses: Pulses palpable and equal bilaterally   Skin: status post left foot I&D with erythema and drainage.    Lymph nodes: No palpable adenopathy   Neurologic: Awake, alert and oriented x 3. Following commands.         Results:        Results from last 7 days  Lab Units 04/12/17 2047   WBC 10*3/mm3 8.22            Lab Results   Component Value Date     NEUTROABS 5.84 04/12/2017            Results from last 7 days  Lab Units 04/12/17 2047   CREATININE mg/dL 1.78*          Imaging Results (last 24 hours)     Procedure Component Value Units Date/Time     XR Foot 2 View Left [[id number]] Updated: 04/12/17 2118            Results Review:  I have personally reviewed laboratory data, culture results, radiology studies and antimicrobial therapy.     Hospital Medications (active)        Dose Frequency Start End     acetaminophen (TYLENOL) tablet 487.5 mg 487.5 mg  Every 4 Hours PRN 4/13/2017       Sig - Route: Take 1.5 tablets by mouth Every 4 (Four) Hours As Needed for Fever. - Oral     atenolol (TENORMIN) tablet 25 mg 25 mg Daily 4/13/2017       Sig - Route: Take 1 tablet by mouth Daily. - Oral     cefepime (MAXIPIME) 2 g/100 mL 0.9% NS (mbp) 2 g Every 8 Hours 4/13/2017       Sig - Route: Infuse 100 mL into a venous catheter Every 8 (Eight) Hours. - Intravenous     cetirizine (zyrTEC) tablet 10 mg 10 mg Daily 4/13/2017       Sig - Route: Take 1 tablet by mouth Daily. - Oral     clindamycin (CLEOCIN) 900 mg in dextrose 5% 50 mL IVPB (premix) 900 mg Once 4/12/2017 4/12/2017     Sig - Route: Infuse 50 mL into a venous catheter 1 (One) Time. - Intravenous     FLUoxetine (PROzac) capsule 20 mg 20 mg Daily 4/13/2017       Sig - Route: Take 1 capsule by mouth Daily. - Oral     gabapentin (NEURONTIN) capsule 800 mg 800 mg Every 8 Hours Scheduled 4/13/2017       Sig - Route: Take 2 capsules by mouth Every 8 (Eight) Hours. - Oral     HYDROcodone-acetaminophen (NORCO)  MG per tablet 1 tablet 1 tablet Every 8 Hours Scheduled 4/13/2017       Sig - Route: Take 1 tablet by mouth Every 8 (Eight) Hours. - Oral     hydrOXYzine (ATARAX) tablet 10 mg 10 mg Daily 4/13/2017       Sig - Route: Take 1 tablet by mouth Daily. - Oral     insulin detemir (LEVEMIR) injection 22 Units 22 Units Nightly 4/13/2017       Sig - Route: Inject 22 Units under the skin Every Night. - Subcutaneous     insulin regular (humuLIN R,novoLIN R) injection 12 Units 12 Units Once 4/12/2017 4/12/2017     Sig - Route: Inject 12 Units under the skin 1 (One) Time. - Subcutaneous     lidocaine PF 1% (XYLOCAINE) injection 10 mL 10 mL Once 4/12/2017       Sig - Route: Inject 10 mL as directed 1 (One) Time. - Injection     Cosign for Ordering: Accepted by Carlos Piper MD on 4/12/2017 10:11 PM     morphine injection 2 mg 2 mg Every 2 Hours PRN 4/13/2017 4/23/2017     Sig - Route: Infuse 1 mL into a venous catheter Every 2  (Two) Hours As Needed for Severe Pain (7-10). - Intravenous     Pharmacy Meds to Bed Consult   Daily 4/13/2017       Sig - Route: Daily. - Does not apply     sodium chloride 0.9 % bolus 1,000 mL 1,000 mL Once 4/12/2017 4/12/2017     Sig - Route: Infuse 1,000 mL into a venous catheter 1 (One) Time. - Intravenous     sodium chloride 0.9 % infusion 75 mL/hr Continuous 4/13/2017       Sig - Route: Infuse 75 mL/hr into a venous catheter Continuous. - Intravenous     temazepam (RESTORIL) capsule 30 mg 30 mg Nightly PRN 4/13/2017       Sig - Route: Take 2 capsules by mouth At Night As Needed for Sleep. - Oral     tiZANidine (ZANAFLEX) tablet 4 mg 4 mg Every 6 Hours Scheduled 4/13/2017       Sig - Route: Take 1 tablet by mouth Every 6 (Six) Hours. - Oral     vancomycin (VANCOCIN) 1,000 mg in sodium chloride 0.9 % 250 mL IVPB 1,000 mg Once 4/12/2017 4/12/2017     Sig - Route: Infuse 1,000 mg into a venous catheter 1 (One) Time. - Intravenous     vancomycin (VANCOCIN) 1,000 mg in sodium chloride 0.9 % 250 mL IVPB 1,000 mg Every 24 Hours 4/13/2017       Sig - Route: Infuse 1,000 mg into a venous catheter Daily. - Intravenous     Pharmacy to dose vancomycin (Discontinued)   Continuous PRN 4/13/2017 4/13/2017     Sig - Route: Continuous As Needed for Consult. - Does not apply     Reason for Discontinue: Dose adjustment             PROBLEM LIST:         Patient Active Problem List   Diagnosis   • Cellulitis of foot, left          ASSESSMENT:     1. Gas Gangrene with Abcess, Cellulitis, Edema, Rule out septic Joint/Osteomyeltis Left foot  2. Diabetes with Neuropathy, Left.      PLAN:     Unfortunately the patient carries a poor limb prognosis in the setting of possible gas gangrene with abcess rule out om/septic joint. Although the patient has eaten breakfast and lunch today, will keep npo thereafter for immediate I and D with Deep Culture. Check CRP and sed rate with blood cultures. Advise Diagnostic imaging to evaluate the  degree of Osteomyelitis involved. ID Following has advised pseudomonal and anaerobic coverage along with vancomycin until cultures back. Will Follow        Patients findings and recommendations were discussed with patient and nursing staff     Code Status: Full Code    Basilio Morris  04/14/2017  11:30 a.m.          Electronically signed by Basilio Morris MD at 4/14/2017  4:00 PM

## 2017-04-17 NOTE — PROGRESS NOTES
PODIATRIC SURGERY PROGRESS NOTE     LOS: 5 days     Subjective     Interval History:     Patient Complaints: none   History taken from: patient      Objective     Vital Signs  Temp:  [97.9 °F (36.6 °C)-98.4 °F (36.9 °C)] 98.3 °F (36.8 °C)  Heart Rate:  [74-82] 78  Resp:  [20] 20  BP: (138-158)/(70-82) 148/72    Labs & Rads    Imaging Results (last 72 hours)     Procedure Component Value Units Date/Time    US Arterial Doppler Lower Extremity Complete [[id number]] Collected:  04/15/17 0831     Updated:  04/15/17 0928    Narrative:       US ARTERIAL DOPPLER LOWER EXTREMITY  COMPLETE-     REASON FOR EXAM:  dm ulcer; L03.116-Cellulitis of left lower limb.     TECHNIQUE:  Multiple real-time color doppler images were obtained.  M-mode Doppler was used for velocity determination and spectral pattern.  Stenosis was evaluated using the NASCET or similar measurement  technique.     Right leg: The external iliac and common femoral arteries are normal in  caliber. The Doppler wave patterns are triphasic. The velocities were 90  and 112 cm/s. There is flow in the profunda femoral and superficial  femoral artery. The superficial femoral artery flow is triphasic  throughout the thigh. The popliteal artery has a triphasic wave pattern,  a velocity of 67 cm/s. There is triphasic flow in the posterior tibial  artery to the ankle. The velocity was 75 cm/s.     Left leg: The external iliac and common femoral arteries are normal in  caliber. The velocities were 146 and 100 cm/s. Doppler wave patterns are  triphasic. There was triphasic flow in the superficial femoral artery.  The popliteal artery has a triphasic wave pattern, velocity of 89 cm/s.  There is triphasic flow in the posterior tibial artery to the ankle.       Impression:       Triphasic flows throughout both lower extremities. No  significant stenosis or occluded vessels were demonstrated.     This report was finalized on 4/15/2017 9:26 AM by Dr. Emanuel Aguiar II, MD.        MRI Foot Left Without Contrast [[id number]] Collected:  04/17/17 1130     Updated:  04/17/17 1328    Narrative:       MRI FOOT LEFT WITHOUT CONTRAST-     REASON FOR EXAM:  Abscess OM; L03.116-Cellulitis of left lower limb.     FINDINGS: Scans were obtained through the foot in the sagittal, axial  and coronal planes using T1 and T2-weighted sequences. The patient had  difficulty holding still for the exam despite repeating sequences. There  is motion artifact on the exam. The marrow signal in the bones of the  foot showed no areas of edema to suggest osteomyelitis. There is soft  tissue swelling around the foot with a defect along the plantar surface  at the level of the 1st metatarsophalangeal joint space.       Impression:       Limited exam due to motion artifact. There were no marrow  signal changes to suggest osteomyelitis. There is edema and a open wound  in the foot just beneath the 1st metatarsophalangeal joint.     This report was finalized on 4/17/2017 1:26 PM by Dr. Emanuel Aguiar II, MD.             Results from last 7 days  Lab Units 04/16/17  0032 04/14/17  1429 04/12/17  2047   WBC 10*3/mm3 5.79  --  8.22   CRP mg/dL  --  30.05*  --      Lab Results   Component Value Date    NEUTROABS 3.53 04/16/2017       Results from last 7 days  Lab Units 04/16/17  0032   CREATININE mg/dL 1.17       Blood Culture   Date Value Ref Range Status   04/16/2017 No growth at 24 hours  Preliminary               Culture   Date Value Ref Range Status   04/15/2017 (A)  Preliminary    Light growth (2+) Gram positive cocci, consistent with Staph spp     Wound Culture   Date Value Ref Range Status   04/12/2017 Scant growth (1+) Staphylococcus aureus (A)  Final       Results Review:    I have personally reviewed laboratory data, culture results, radiology studies and antimicrobial therapy.    Physical Exam:   Physical Exam Erythema with necrosis skin first mtpj,  Edema, erythema. Open necrotic wounds medial and plantar serous  drainage.      Results Review:     I reviewed the patient's new clinical results.      Assessment:  Active Problems:    Cellulitis of foot, left      Plan:Continue packing, IV antibiotic per ID, plan DC with home health.         Basilio Morris MD  04/17/17  7:15 PM

## 2017-04-17 NOTE — PROGRESS NOTES
Discharge Planning Assessment   Jose Alfredo     Patient Name: Reny Elena  MRN: [id number]  Today's Date: 4/17/2017    Admit Date: 4/12/2017       Discharge Plan       04/17/17 1341    Case Management/Social Work Plan    Plan SS received consult per md request for wound management. SS spoke to daughter, Liza Oliva [phone] who states pt has utilized [facility] in the past and will need [facility] arranged prior to discharge. [facility] to be arranged with MD order. CM following.           Expected Discharge Date and Time     Expected Discharge Date Expected Discharge Time    Apr 19, 2017           Jihan Dugan

## 2017-04-17 NOTE — PROGRESS NOTES
"  I have personally seen and examined the patient today and discussed overnight interval progress and pertinent issues with nursing staff.    Subjective    Status post I&D for left foot over the weekend.  No complaints this morning.  Culture finalized as MSSA.    Review of Systems    Constitutional: no fever, chills and night sweats. No appetite change or unexpected weight change. No fatigue.  Eyes: no eye drainage, itching or redness.  HEENT: no mouth sores, dysphagia or nose bleed.  Respiratory: no for shortness of breath, cough or production of sputum.  Cardiovascular: no chest pain, no palpitations, no orthopnea.  Gastrointestinal: no nausea, vomiting or diarrhea. No abdominal pain, hematemesis or rectal bleeding.  Genitourinary: no dysuria or polyuria.  Hematologic/lymphatic: no lymph node abnormalities, no easy bruising or easy bleeding.  Musculoskeletal: Positive for foot pain  Skin: Status post I&D of the left foot  Neurological: no loss of consciousness, no seizure, no headache.  Behavioral/Psych: no depression or suicidal ideation.  Endocrine: no hot flashes.  Immunologic: negative.      History taken from: patient chart RN      Vital Signs    /70 (BP Location: Right arm, Patient Position: Lying)  Pulse 80  Temp 97.9 °F (36.6 °C) (Oral)   Resp 20  Ht 66\" (167.6 cm)  Wt 157 lb 9 oz (71.5 kg)  SpO2 98%  BMI 25.43 kg/m2    Temp:  [97.9 °F (36.6 °C)-100.1 °F (37.8 °C)] 97.9 °F (36.6 °C)      Intake/Output Summary (Last 24 hours) at 04/17/17 1210  Last data filed at 04/17/17 0813   Gross per 24 hour   Intake             1080 ml   Output                0 ml   Net             1080 ml     Intake & Output (last 3 days)       04/14 0701 - 04/15 0700 04/15 0701 - 04/16 0700 04/16 0701 - 04/17 0700 04/17 0701 - 04/18 0700    P.O. 1080 1080 720 360    I.V. (mL/kg) 1000 (14) 500 (7)      IV Piggyback 100       Total Intake(mL/kg) 2180 (30.5) 1580 (22.1) 720 (10.1) 360 (5)    Urine (mL/kg/hr)        Total " Output            Net +2180 +1580 +720 +360            Unmeasured Urine Occurrence 5 x 8 x 9 x     Unmeasured Stool Occurrence 0 x 5 x 3 x           Physical exam:    General Appearance:    Slightly confused, Alert, cooperative, in no acute distress, nurse at bedside   Head:    Normocephalic, without obvious abnormality, atraumatic   Eyes:            Lids and lashes normal, conjunctivae and sclerae normal, no   icterus, no pallor, corneas clear, PERRLA   Ears:    Ears appear intact with no abnormalities noted   Throat:   No oral lesions, no thrush, oral mucosa moist   Neck:   No adenopathy, supple, trachea midline, no thyromegaly, no   carotid bruit, no JVD   Back:     No tenderness to percussion or palpation, range of motion   normal   Lungs:     Clear to auscultation,respirations regular, even and unlabored. No wheezing, no ronchi and no crackles.    Heart:    Regular rhythm and normal rate, normal S1 and S2, no            murmur, no gallop, no rub, no click   Chest Wall:    No abnormalities observed   Abdomen:     Normal bowel sounds, no masses, no organomegaly, soft        non-tender, non-distended, no guarding, no rebound                tenderness   Rectal:     Deferred   Extremities:   Foot reveals large blister with extending erythema to the dorsum with bruising, swelling, and tenderness as well as warmth.     Pulses:   Pulses palpable and equal bilaterally   Skin:  Foot reveals large blister with extending erythema to the dorsum with bruising, swelling, and tenderness as well as warmth.     Lymph nodes:   No palpable adenopathy   Neurologic:   Awake, alert, slightly confused           Results:      Results from last 7 days  Lab Units 04/16/17  0032 04/12/17  2047   WBC 10*3/mm3 5.79 8.22     Lab Results   Component Value Date    NEUTROABS 3.53 04/16/2017         Results from last 7 days  Lab Units 04/16/17  0032   CREATININE mg/dL 1.17     Results Review:    I have personally reviewed laboratory data,  culture results, radiology studies and antimicrobial therapy.    Hospital Medications (active)       Dose Frequency Start End    acetaminophen (TYLENOL) tablet 487.5 mg 487.5 mg Every 4 Hours PRN 4/13/2017     Sig - Route: Take 1.5 tablets by mouth Every 4 (Four) Hours As Needed for Fever. - Oral    ALPRAZolam (XANAX) tablet 0.5 mg 0.5 mg Once 4/14/2017 4/14/2017    Sig - Route: Take 1 tablet by mouth 1 (One) Time. - Oral    atenolol (TENORMIN) tablet 25 mg 25 mg Daily 4/13/2017     Sig - Route: Take 1 tablet by mouth Daily. - Oral    cefepime (MAXIPIME) 2 g/100 mL 0.9% NS (mbp) 2 g Every 12 Hours 4/14/2017     Sig - Route: Infuse 100 mL into a venous catheter Every 12 (Twelve) Hours. - Intravenous    cetirizine (zyrTEC) tablet 10 mg 10 mg Daily 4/13/2017     Sig - Route: Take 1 tablet by mouth Daily. - Oral    dextrose (D50W) solution 25 g 25 g Every 15 Minutes PRN 4/13/2017     Sig - Route: Infuse 50 mL into a venous catheter Every 15 (Fifteen) Minutes As Needed for Low Blood Sugar (Blood Sugar Less Than 70, Patient Has IV Access - Unresponsive, NPO or Unable To Safely Swallow). - Intravenous    dextrose (GLUTOSE) oral gel 15 g 15 g Every 15 Minutes PRN 4/13/2017     Sig - Route: Take 15 g by mouth Every 15 (Fifteen) Minutes As Needed for Low Blood Sugar (Blood Sugar Less Than 70, Patient Alert, Is Not NPO & Can Safely Swallow). - Oral    FLUoxetine (PROzac) capsule 20 mg 20 mg Daily 4/13/2017     Sig - Route: Take 1 capsule by mouth Daily. - Oral    gabapentin (NEURONTIN) capsule 800 mg 800 mg Every 8 Hours Scheduled 4/13/2017     Sig - Route: Take 2 capsules by mouth Every 8 (Eight) Hours. - Oral    glucagon (human recombinant) (GLUCAGEN DIAGNOSTIC) injection 1 mg 1 mg Every 15 Minutes PRN 4/13/2017     Sig - Route: Inject 1 mg under the skin Every 15 (Fifteen) Minutes As Needed (Blood Glucose Less Than 70 - Patient Without IV Access - Unresponsive, NPO or Unable To Safely Swallow). - Subcutaneous     HYDROcodone-acetaminophen (NORCO)  MG per tablet 1 tablet 1 tablet Every 8 Hours Scheduled 4/13/2017     Sig - Route: Take 1 tablet by mouth Every 8 (Eight) Hours. - Oral    hydrOXYzine (ATARAX) tablet 10 mg 10 mg Daily 4/13/2017     Sig - Route: Take 1 tablet by mouth Daily. - Oral    insulin aspart (novoLOG) injection 0-9 Units 0-9 Units 4 Times Daily Before Meals & Nightly 4/13/2017     Sig - Route: Inject 0-9 Units under the skin 4 (Four) Times a Day Before Meals & at Bedtime. - Subcutaneous    insulin detemir (LEVEMIR) injection 22 Units 22 Units Nightly 4/13/2017     Sig - Route: Inject 22 Units under the skin Every Night. - Subcutaneous    lidocaine PF 1% (XYLOCAINE) injection 10 mL 10 mL Once 4/12/2017     Sig - Route: Inject 10 mL as directed 1 (One) Time. - Injection    Cosign for Ordering: Accepted by Carlos Piper MD on 4/12/2017 10:11 PM    metroNIDAZOLE (FLAGYL) IVPB 500 mg 500 mg Every 8 Hours 4/13/2017     Sig - Route: Infuse 100 mL into a venous catheter Every 8 (Eight) Hours. - Intravenous    Cosign for Ordering: Accepted by Ricci Rubio MD on 4/13/2017 12:50 PM    morphine injection 2 mg 2 mg Every 2 Hours PRN 4/13/2017 4/23/2017    Sig - Route: Infuse 1 mL into a venous catheter Every 2 (Two) Hours As Needed for Severe Pain (7-10). - Intravenous    Pharmacy Meds to Bed Consult  Daily 4/13/2017     Sig - Route: Daily. - Does not apply    sodium chloride 0.9 % infusion 75 mL/hr Continuous 4/13/2017     Sig - Route: Infuse 75 mL/hr into a venous catheter Continuous. - Intravenous    temazepam (RESTORIL) capsule 30 mg 30 mg Nightly PRN 4/13/2017     Sig - Route: Take 2 capsules by mouth At Night As Needed for Sleep. - Oral    tiZANidine (ZANAFLEX) tablet 4 mg 4 mg Every 6 Hours Scheduled 4/13/2017     Sig - Route: Take 1 tablet by mouth Every 6 (Six) Hours. - Oral    vancomycin (VANCOCIN) 1,000 mg in sodium chloride 0.9 % 250 mL IVPB 1,000 mg Every 24 Hours 4/13/2017     Sig - Route:  Infuse 1,000 mg into a venous catheter Daily. - Intravenous    cefepime (MAXIPIME) 2 g/100 mL 0.9% NS (mbp) (Discontinued) 2 g Every 8 Hours 4/13/2017 4/14/2017    Sig - Route: Infuse 100 mL into a venous catheter Every 8 (Eight) Hours. - Intravenous            Cultures:    Blood Culture   Date Value Ref Range Status   04/12/2017 No growth at 24 hours  Preliminary   04/12/2017 No growth at 24 hours  Preliminary     Wound Culture   Date Value Ref Range Status   04/12/2017 No growth at 24 hours  Preliminary       PROBLEM LIST:    Patient Active Problem List   Diagnosis   • Cellulitis of foot, left       Assessment/Plan     ASSESSMENT:    1. Abscess of the foot     PLAN:    Based on culture results antibiotic therapy was optimized to cefazolin 2 g IV every 8 hours and duration to be decided when MRI results are available as the patient might require prolonged therapy and/or debridement.     Status post of the left foot over the weekend.    Unfortunately the patient carries a poor limb prognosis in the setting of possible abscess to the foot.       Patients findings and recommendations were discussed with patient and nursing staff    Code Status: Full Code    Annalise Garcia PA-C  04/17/17  12:10 PM

## 2017-04-18 LAB
ANION GAP SERPL CALCULATED.3IONS-SCNC: 8.7 MMOL/L (ref 3.6–11.2)
BACTERIA SPEC AEROBE CULT: ABNORMAL
BACTERIA SPEC AEROBE CULT: NORMAL
BASOPHILS # BLD AUTO: 0.03 10*3/MM3 (ref 0–0.3)
BASOPHILS NFR BLD AUTO: 0.5 % (ref 0–2)
BUN BLD-MCNC: 9 MG/DL (ref 7–21)
BUN/CREAT SERPL: 9.6 (ref 7–25)
CALCIUM SPEC-SCNC: 8.3 MG/DL (ref 7.7–10)
CHLORIDE SERPL-SCNC: 108 MMOL/L (ref 99–112)
CO2 SERPL-SCNC: 23.3 MMOL/L (ref 24.3–31.9)
CREAT BLD-MCNC: 0.94 MG/DL (ref 0.43–1.29)
DEPRECATED RDW RBC AUTO: 49.3 FL (ref 37–54)
EOSINOPHIL # BLD AUTO: 0.16 10*3/MM3 (ref 0–0.7)
EOSINOPHIL NFR BLD AUTO: 2.7 % (ref 0–5)
ERYTHROCYTE [DISTWIDTH] IN BLOOD BY AUTOMATED COUNT: 13.8 % (ref 11.5–14.5)
GFR SERPL CREATININE-BSD FRML MDRD: 61 ML/MIN/1.73
GLUCOSE BLD-MCNC: 195 MG/DL (ref 70–110)
GLUCOSE BLDC GLUCOMTR-MCNC: 143 MG/DL (ref 70–130)
GLUCOSE BLDC GLUCOMTR-MCNC: 145 MG/DL (ref 70–130)
GLUCOSE BLDC GLUCOMTR-MCNC: 204 MG/DL (ref 70–130)
GLUCOSE BLDC GLUCOMTR-MCNC: 233 MG/DL (ref 70–130)
GRAM STN SPEC: ABNORMAL
HCT VFR BLD AUTO: 31.1 % (ref 37–47)
HGB BLD-MCNC: 9.5 G/DL (ref 12–16)
IMM GRANULOCYTES # BLD: 0.07 10*3/MM3 (ref 0–0.03)
IMM GRANULOCYTES NFR BLD: 1.2 % (ref 0–0.5)
LYMPHOCYTES # BLD AUTO: 0.89 10*3/MM3 (ref 1–3)
LYMPHOCYTES NFR BLD AUTO: 15.2 % (ref 21–51)
MCH RBC QN AUTO: 29.6 PG (ref 27–33)
MCHC RBC AUTO-ENTMCNC: 30.5 G/DL (ref 33–37)
MCV RBC AUTO: 96.9 FL (ref 80–94)
MONOCYTES # BLD AUTO: 0.55 10*3/MM3 (ref 0.1–0.9)
MONOCYTES NFR BLD AUTO: 9.4 % (ref 0–10)
NEUTROPHILS # BLD AUTO: 4.14 10*3/MM3 (ref 1.4–6.5)
NEUTROPHILS NFR BLD AUTO: 71 % (ref 30–70)
OSMOLALITY SERPL CALC.SUM OF ELEC: 283.4 MOSM/KG (ref 273–305)
PLATELET # BLD AUTO: 187 10*3/MM3 (ref 130–400)
PMV BLD AUTO: 10.9 FL (ref 6–10)
POTASSIUM BLD-SCNC: 3.7 MMOL/L (ref 3.5–5.3)
RBC # BLD AUTO: 3.21 10*6/MM3 (ref 4.2–5.4)
SODIUM BLD-SCNC: 140 MMOL/L (ref 135–153)
WBC NRBC COR # BLD: 5.84 10*3/MM3 (ref 4.5–12.5)

## 2017-04-18 PROCEDURE — 82962 GLUCOSE BLOOD TEST: CPT

## 2017-04-18 PROCEDURE — 25010000003 CEFAZOLIN PER 500 MG: Performed by: PHYSICIAN ASSISTANT

## 2017-04-18 PROCEDURE — 63710000001 INSULIN ASPART PER 5 UNITS: Performed by: FAMILY MEDICINE

## 2017-04-18 PROCEDURE — 80048 BASIC METABOLIC PNL TOTAL CA: CPT | Performed by: PODIATRIST

## 2017-04-18 PROCEDURE — 85025 COMPLETE CBC W/AUTO DIFF WBC: CPT | Performed by: PODIATRIST

## 2017-04-18 PROCEDURE — 25010000002 ONDANSETRON PER 1 MG: Performed by: FAMILY MEDICINE

## 2017-04-18 PROCEDURE — 25010000002 ENOXAPARIN PER 10 MG: Performed by: PODIATRIST

## 2017-04-18 PROCEDURE — 94799 UNLISTED PULMONARY SVC/PX: CPT

## 2017-04-18 PROCEDURE — 63710000001 INSULIN DETEMIR PER 5 UNITS: Performed by: FAMILY MEDICINE

## 2017-04-18 RX ORDER — IBUPROFEN 800 MG/1
800 TABLET ORAL EVERY 6 HOURS PRN
Status: DISCONTINUED | OUTPATIENT
Start: 2017-04-18 | End: 2017-04-20 | Stop reason: HOSPADM

## 2017-04-18 RX ORDER — L.ACID,CASEI/B.ANIMAL/S.THERMO 16B CELL
1 CAPSULE ORAL DAILY
Status: DISCONTINUED | OUTPATIENT
Start: 2017-04-18 | End: 2017-04-20 | Stop reason: HOSPADM

## 2017-04-18 RX ORDER — HYDROCODONE BITARTRATE AND ACETAMINOPHEN 7.5; 325 MG/1; MG/1
1 TABLET ORAL EVERY 4 HOURS PRN
Status: DISCONTINUED | OUTPATIENT
Start: 2017-04-18 | End: 2017-04-20 | Stop reason: HOSPADM

## 2017-04-18 RX ORDER — SODIUM CHLORIDE 9 MG/ML
100 INJECTION, SOLUTION INTRAVENOUS CONTINUOUS
Status: DISCONTINUED | OUTPATIENT
Start: 2017-04-18 | End: 2017-04-20 | Stop reason: HOSPADM

## 2017-04-18 RX ORDER — MORPHINE SULFATE 2 MG/ML
1 INJECTION, SOLUTION INTRAMUSCULAR; INTRAVENOUS EVERY 4 HOURS PRN
Status: DISCONTINUED | OUTPATIENT
Start: 2017-04-18 | End: 2017-04-20 | Stop reason: HOSPADM

## 2017-04-18 RX ORDER — NALOXONE HCL 0.4 MG/ML
0.4 VIAL (ML) INJECTION
Status: DISCONTINUED | OUTPATIENT
Start: 2017-04-18 | End: 2017-04-20 | Stop reason: HOSPADM

## 2017-04-18 RX ADMIN — GABAPENTIN 800 MG: 400 CAPSULE ORAL at 05:35

## 2017-04-18 RX ADMIN — CEFAZOLIN SODIUM 2 G: 2 SOLUTION INTRAVENOUS at 12:31

## 2017-04-18 RX ADMIN — INSULIN ASPART 4 UNITS: 100 INJECTION, SOLUTION INTRAVENOUS; SUBCUTANEOUS at 21:25

## 2017-04-18 RX ADMIN — ENOXAPARIN SODIUM 40 MG: 40 INJECTION SUBCUTANEOUS at 09:00

## 2017-04-18 RX ADMIN — TIZANIDINE 4 MG: 4 TABLET ORAL at 05:35

## 2017-04-18 RX ADMIN — INSULIN DETEMIR 25 UNITS: 100 INJECTION, SOLUTION SUBCUTANEOUS at 21:32

## 2017-04-18 RX ADMIN — GABAPENTIN 800 MG: 400 CAPSULE ORAL at 21:23

## 2017-04-18 RX ADMIN — HYDROCODONE BITARTRATE AND ACETAMINOPHEN 1 TABLET: 10; 325 TABLET ORAL at 14:42

## 2017-04-18 RX ADMIN — FLUOXETINE 20 MG: 20 CAPSULE ORAL at 09:00

## 2017-04-18 RX ADMIN — SODIUM HYPOCHLORITE: 2.5 SOLUTION TOPICAL at 09:01

## 2017-04-18 RX ADMIN — HYDROCODONE BITARTRATE AND ACETAMINOPHEN 1 TABLET: 10; 325 TABLET ORAL at 21:23

## 2017-04-18 RX ADMIN — ONDANSETRON 4 MG: 2 INJECTION, SOLUTION INTRAMUSCULAR; INTRAVENOUS at 20:39

## 2017-04-18 RX ADMIN — SODIUM HYPOCHLORITE: 2.5 SOLUTION TOPICAL at 23:38

## 2017-04-18 RX ADMIN — GABAPENTIN 800 MG: 400 CAPSULE ORAL at 14:42

## 2017-04-18 RX ADMIN — TIZANIDINE 4 MG: 4 TABLET ORAL at 00:05

## 2017-04-18 RX ADMIN — SODIUM CHLORIDE 75 ML/HR: 9 INJECTION, SOLUTION INTRAVENOUS at 10:12

## 2017-04-18 RX ADMIN — ONDANSETRON 4 MG: 2 INJECTION, SOLUTION INTRAMUSCULAR; INTRAVENOUS at 06:36

## 2017-04-18 RX ADMIN — TIZANIDINE 4 MG: 4 TABLET ORAL at 12:30

## 2017-04-18 RX ADMIN — HYDROCODONE BITARTRATE AND ACETAMINOPHEN 1 TABLET: 10; 325 TABLET ORAL at 05:35

## 2017-04-18 RX ADMIN — TIZANIDINE 4 MG: 4 TABLET ORAL at 17:23

## 2017-04-18 RX ADMIN — Medication 1 CAPSULE: at 11:59

## 2017-04-18 RX ADMIN — CETIRIZINE HYDROCHLORIDE 10 MG: 10 TABLET ORAL at 09:00

## 2017-04-18 RX ADMIN — ATENOLOL 25 MG: 25 TABLET ORAL at 09:00

## 2017-04-18 RX ADMIN — HYDROCODONE BITARTRATE AND ACETAMINOPHEN 1 TABLET: 7.5; 325 TABLET ORAL at 11:15

## 2017-04-18 RX ADMIN — TIZANIDINE 4 MG: 4 TABLET ORAL at 23:38

## 2017-04-18 RX ADMIN — INSULIN ASPART 4 UNITS: 100 INJECTION, SOLUTION INTRAVENOUS; SUBCUTANEOUS at 17:23

## 2017-04-18 RX ADMIN — CEFAZOLIN SODIUM 2 G: 2 SOLUTION INTRAVENOUS at 20:40

## 2017-04-18 RX ADMIN — HYDROXYZINE HYDROCHLORIDE 10 MG: 10 TABLET ORAL at 09:00

## 2017-04-18 RX ADMIN — CEFAZOLIN SODIUM 2 G: 2 SOLUTION INTRAVENOUS at 05:35

## 2017-04-18 NOTE — PROGRESS NOTES
Reny Elena 57 y.o.   04/18/17    Subjective: Patient currently doing well lying in bed no complaints  Objective: STABLE WOUND DRESSED  Vital Signs (last 72 hrs)       04/14 0700  -  04/15 0659 04/15 0700  -  04/16 0659 04/16 0700  -  04/17 0659 04/17 0700  -  04/18 0620   Most Recent    Temp (°F) 97.7 -  98.7    97.2 -  98.7    98.1 -  100.1    97.9 -  98.4     98.1 (36.7)    Heart Rate 61 -  98    72 -  90    79 -  97    68 -  80     78    Resp   18    8 -  20      18      20     20    /66 -  148/59    135/73 -  168/83    135/68 -  (!) 207/94    144/70 -  175/79     175/79    SpO2 (%)   93    92 -  100      91    96 -  98     96        Lab Results (last 72 hours)     Procedure Component Value Units Date/Time    POC Glucose Fingerstick [[id number]]  (Abnormal) Collected:  04/15/17 0746    Specimen:  Blood Updated:  04/15/17 0749     Glucose 186 (H) mg/dL     Narrative:       Physician Notified Meter: [id number] : [id number] Becca MICHELLE    POC Glucose Fingerstick [[id number]]  (Abnormal) Collected:  04/15/17 1147    Specimen:  Blood Updated:  04/15/17 1155     Glucose 170 (H) mg/dL     Narrative:       Meter: [id number] : [id number] [employer]    POC Glucose Fingerstick [[id number]]  (Abnormal) Collected:  04/15/17 1703    Specimen:  Blood Updated:  04/15/17 1708     Glucose 242 (H) mg/dL     Narrative:       Meter: [id number] : [id number] [employer]    POC Glucose Fingerstick [[id number]]  (Abnormal) Collected:  04/15/17 2043    Specimen:  Blood Updated:  04/15/17 2046     Glucose 233 (H) mg/dL     Narrative:       Meter: [id number] : [id number] Venkata Majano    Vancomycin, Trough [[id number]]  (Normal) Collected:  04/15/17 2003    Specimen:  Blood Updated:  04/15/17 2054     Vancomycin Trough 9.70 mcg/mL     CBC & Differential [[id number]] Collected:  04/16/17 0032    Specimen:  Blood Updated:  04/16/17 0203    Narrative:       The following orders were created for panel order CBC &  Differential.  Procedure                               Abnormality         Status                     ---------                               -----------         ------                     CBC Auto Differential[[id number]]         Abnormal            Final result                 Please view results for these tests on the individual orders.    CBC Auto Differential [[id number]]  (Abnormal) Collected:  04/16/17 0032    Specimen:  Blood Updated:  04/16/17 0203     WBC 5.79 10*3/mm3      RBC 2.90 (L) 10*6/mm3      Hemoglobin 8.4 (L) g/dL      Hematocrit 28.7 (L) %      MCV 99.0 (H) fL      MCH 29.0 pg      MCHC 29.3 (L) g/dL      RDW 13.4 %      RDW-SD 46.4 fl      MPV 11.0 (H) fL      Platelets 140 10*3/mm3      Neutrophil % 61.0 %      Lymphocyte % 18.8 (L) %      Monocyte % 14.7 (H) %      Eosinophil % 4.5 %      Basophil % 0.5 %      Immature Grans % 0.5 %      Neutrophils, Absolute 3.53 10*3/mm3      Lymphocytes, Absolute 1.09 10*3/mm3      Monocytes, Absolute 0.85 10*3/mm3      Eosinophils, Absolute 0.26 10*3/mm3      Basophils, Absolute 0.03 10*3/mm3      Immature Grans, Absolute 0.03 10*3/mm3     Basic Metabolic Panel [[id number]]  (Abnormal) Collected:  04/16/17 0032    Specimen:  Blood Updated:  04/16/17 0236     Glucose 237 (H) mg/dL      BUN 11 mg/dL      Creatinine 1.17 mg/dL      Sodium 136 mmol/L      Potassium 4.1 mmol/L      Chloride 110 mmol/L      CO2 18.6 (L) mmol/L      Calcium 8.1 mg/dL      eGFR Non African Amer 47 (L) mL/min/1.73      BUN/Creatinine Ratio 9.4     Anion Gap 7.4 mmol/L     Narrative:       GFR Normal >60  Chronic Kidney Disease <60  Kidney Failure <15    Osmolality, Calculated [[id number]]  (Normal) Collected:  04/16/17 0032    Specimen:  Blood Updated:  04/16/17 0236     Osmolality Calc 279.1 mOsm/kg     POC Glucose Fingerstick [[id number]]  (Abnormal) Collected:  04/16/17 0719    Specimen:  Blood Updated:  04/16/17 0744     Glucose 199 (H) mg/dL     Narrative:       Meter: [id number]  : [id number] Ontela    POC Glucose Fingerstick [[id number]]  (Abnormal) Collected:  04/16/17 1158    Specimen:  Blood Updated:  04/16/17 1207     Glucose 224 (H) mg/dL     Narrative:       Meter: [id number] : [id number] Ontela    POC Glucose Fingerstick [[id number]]  (Abnormal) Collected:  04/16/17 1706    Specimen:  Blood Updated:  04/16/17 1713     Glucose 220 (H) mg/dL     Narrative:       Meter: [id number] : [id number] Ontela    POC Glucose Fingerstick [[id number]]  (Abnormal) Collected:  04/16/17 2046    Specimen:  Blood Updated:  04/16/17 2049     Glucose 212 (H) mg/dL     Narrative:       Meter: [id number] : [id number] Venkata Majano    Culture, Routine [[id number]]  (Abnormal)  (Susceptibility) Collected:  04/15/17 0841    Specimen:  Swab from Toe, Left Updated:  04/17/17 0720     Culture Moderate growth (3+) Staphylococcus aureus (A)     Gram Stain Result Occasional Gram positive cocci in pairs    Susceptibility      Staphylococcus aureus     DAVID     Amoxicillin + Clavulanate <=4/2 ug/ml Susceptible     Ampicillin <=2 ug/ml Resistant     Ampicillin + Sulbactam <=8/4 ug/ml Susceptible     Ceftriaxone <=8 ug/ml Susceptible     Ciprofloxacin <=1 ug/ml Susceptible     Clindamycin <=0.5 ug/ml Susceptible     Erythromycin <=0.5 ug/ml Susceptible     Gentamicin <=4 ug/ml Susceptible     Levofloxacin <=1 ug/ml Susceptible  [1]      Moxifloxacin <=0.5 ug/ml Susceptible     Oxacillin <=0.25 ug/ml Susceptible     Penicillin G <=0.03 ug/ml Resistant     Rifampin <=1 ug/ml Susceptible     Tetracycline <=4 ug/ml Susceptible     Trimethoprim + Sulfamethoxazole <=0.5/9.5 ug/ml Susceptible     Vancomycin 1 ug/ml Susceptible            [1]   Staphylococcus species may develop resistance during prolonged therapy with quinolones.  Isolates that are initially susceptible may become resistant within three to four days after initiation of therapy. Testing of repeat isolates may be warranted.                  Wound Culture [[id number]]  (Abnormal)  (Susceptibility) Collected:  04/12/17 2047    Specimen:  Wound from Foot, Left Updated:  04/17/17 0731     Wound Culture Scant growth (1+) Staphylococcus aureus (A)     Gram Stain Result No WBCs or organisms seen    Susceptibility      Staphylococcus aureus     DAVID     Amoxicillin + Clavulanate <=4/2 ug/ml Susceptible     Ampicillin <=2 ug/ml Resistant     Ampicillin + Sulbactam <=8/4 ug/ml Susceptible     Ceftriaxone <=8 ug/ml Susceptible     Ciprofloxacin <=1 ug/ml Susceptible     Clindamycin <=0.5 ug/ml Susceptible     Erythromycin <=0.5 ug/ml Susceptible     Gentamicin <=4 ug/ml Susceptible     Levofloxacin <=1 ug/ml Susceptible  [1]      Moxifloxacin <=0.5 ug/ml Susceptible     Oxacillin <=0.25 ug/ml Susceptible     Penicillin G <=0.03 ug/ml Resistant     Rifampin <=1 ug/ml Susceptible     Tetracycline <=4 ug/ml Susceptible     Trimethoprim + Sulfamethoxazole <=0.5/9.5 ug/ml Susceptible     Vancomycin 1 ug/ml Susceptible            [1]   Staphylococcus species may develop resistance during prolonged therapy with quinolones.  Isolates that are initially susceptible may become resistant within three to four days after initiation of therapy. Testing of repeat isolates may be warranted.                 POC Glucose Fingerstick [[id number]]  (Abnormal) Collected:  04/17/17 0746    Specimen:  Blood Updated:  04/17/17 0750     Glucose 164 (H) mg/dL     Narrative:       Meter: [id number] : [id number] chucho robert    Culture, Routine [[id number]]  (Abnormal) Collected:  04/15/17 1118    Specimen:  Tissue from Toe, Left Updated:  04/17/17 0830     Culture Light growth (2+) Gram positive cocci, consistent with Staph spp (A)     Gram Stain Result Occasional Gram positive cocci in pairs    POC Glucose Fingerstick [[id number]]  (Abnormal) Collected:  04/17/17 1133    Specimen:  Blood Updated:  04/17/17 1137     Glucose 184 (H) mg/dL     Narrative:       Meter: [id number]  : [id number] rankin robert    Blood Culture [[id number]]  (Normal) Collected:  04/16/17 1407    Specimen:  Blood from Arm, Right Updated:  04/17/17 1501     Blood Culture No growth at 24 hours    Blood Culture [[id number]]  (Normal) Collected:  04/14/17 1429    Specimen:  Blood from Arm, Right Updated:  04/17/17 1501     Blood Culture No growth at 3 days    Blood Culture [[id number]]  (Normal) Collected:  04/14/17 1519    Specimen:  Blood from Arm, Right Updated:  04/17/17 1601     Blood Culture No growth at 3 days    POC Glucose Fingerstick [[id number]]  (Abnormal) Collected:  04/17/17 1655    Specimen:  Blood Updated:  04/17/17 1700     Glucose 191 (H) mg/dL     Narrative:       Meter: [id number] : [id number] chucho jones    Blood Culture [[id number]]  (Normal) Collected:  04/16/17 1522    Specimen:  Blood from Arm, Right Updated:  04/17/17 1701     Blood Culture No growth at 24 hours    POC Glucose Fingerstick [[id number]]  (Abnormal) Collected:  04/17/17 2135    Specimen:  Blood Updated:  04/17/17 2143     Glucose 215 (H) mg/dL     Narrative:       Meter: [id number] : [id number] harvey fermin    Blood Culture [[id number]]  (Normal) Collected:  04/12/17 2047    Specimen:  Blood from Arm, Right Updated:  04/17/17 2201     Blood Culture No growth at 5 days    Blood Culture [[id number]]  (Normal) Collected:  04/12/17 2127    Specimen:  Blood from Arm, Left Updated:  04/18/17 0002     Blood Culture No growth at 5 days    CBC & Differential [[id number]] Collected:  04/18/17 0029    Specimen:  Blood Updated:  04/18/17 0114    Narrative:       The following orders were created for panel order CBC & Differential.  Procedure                               Abnormality         Status                     ---------                               -----------         ------                     CBC Auto Differential[[id number]]         Abnormal            Final result                 Please view results for these tests on the individual  orders.    CBC Auto Differential [[id number]]  (Abnormal) Collected:  04/18/17 0029    Specimen:  Blood Updated:  04/18/17 0114     WBC 5.84 10*3/mm3      RBC 3.21 (L) 10*6/mm3      Hemoglobin 9.5 (L) g/dL      Hematocrit 31.1 (L) %      MCV 96.9 (H) fL      MCH 29.6 pg      MCHC 30.5 (L) g/dL      RDW 13.8 %      RDW-SD 49.3 fl      MPV 10.9 (H) fL      Platelets 187 10*3/mm3      Neutrophil % 71.0 (H) %      Lymphocyte % 15.2 (L) %      Monocyte % 9.4 %      Eosinophil % 2.7 %      Basophil % 0.5 %      Immature Grans % 1.2 (H) %      Neutrophils, Absolute 4.14 10*3/mm3      Lymphocytes, Absolute 0.89 (L) 10*3/mm3      Monocytes, Absolute 0.55 10*3/mm3      Eosinophils, Absolute 0.16 10*3/mm3      Basophils, Absolute 0.03 10*3/mm3      Immature Grans, Absolute 0.07 (H) 10*3/mm3     Basic Metabolic Panel [[id number]]  (Abnormal) Collected:  04/18/17 0029    Specimen:  Blood Updated:  04/18/17 0142     Glucose 195 (H) mg/dL      BUN 9 mg/dL      Creatinine 0.94 mg/dL      Sodium 140 mmol/L      Potassium 3.7 mmol/L      Chloride 108 mmol/L      CO2 23.3 (L) mmol/L      Calcium 8.3 mg/dL      eGFR Non African Amer 61 mL/min/1.73      BUN/Creatinine Ratio 9.6     Anion Gap 8.7 mmol/L     Narrative:       GFR Normal >60  Chronic Kidney Disease <60  Kidney Failure <15    Osmolality, Calculated [[id number]]  (Normal) Collected:  04/18/17 0029    Specimen:  Blood Updated:  04/18/17 0142     Osmolality Calc 283.4 mOsm/kg         Imaging Results (last 24 hours)     Procedure Component Value Units Date/Time    MRI Foot Left Without Contrast [[id number]] Collected:  04/17/17 1130     Updated:  04/17/17 1328    Narrative:       MRI FOOT LEFT WITHOUT CONTRAST-     REASON FOR EXAM:  Abscess OM; L03.116-Cellulitis of left lower limb.     FINDINGS: Scans were obtained through the foot in the sagittal, axial  and coronal planes using T1 and T2-weighted sequences. The patient had  difficulty holding still for the exam despite repeating  sequences. There  is motion artifact on the exam. The marrow signal in the bones of the  foot showed no areas of edema to suggest osteomyelitis. There is soft  tissue swelling around the foot with a defect along the plantar surface  at the level of the 1st metatarsophalangeal joint space.       Impression:       Limited exam due to motion artifact. There were no marrow  signal changes to suggest osteomyelitis. There is edema and a open wound  in the foot just beneath the 1st metatarsophalangeal joint.     This report was finalized on 4/17/2017 1:26 PM by Dr. Emanuel Aguiar II, MD.           Assessment:Active Problems:    Cellulitis of foot, left   Abscess on the left foot  Plan: MRI showed no signs of osteomyelitis will continue with the IV antibiotics and wound care

## 2017-04-18 NOTE — PLAN OF CARE
Problem: Patient Care Overview (Adult)  Goal: Plan of Care Review  Outcome: Ongoing (interventions implemented as appropriate)    04/17/17 1705 04/17/17 2100   Coping/Psychosocial Response Interventions   Plan Of Care Reviewed With --  patient   Patient Care Overview   Progress improving --        Goal: Adult Individualization and Mutuality  Outcome: Ongoing (interventions implemented as appropriate)    Problem: Cellulitis (Adult)  Goal: Signs and Symptoms of Listed Potential Problems Will be Absent or Manageable (Cellulitis)  Outcome: Ongoing (interventions implemented as appropriate)    Problem: Pressure Ulcer Risk (Ehlio Scale) (Adult,Obstetrics,Pediatric)  Goal: Identify Related Risk Factors and Signs and Symptoms  Outcome: Ongoing (interventions implemented as appropriate)  Goal: Skin Integrity  Outcome: Ongoing (interventions implemented as appropriate)    Problem: Pain, Acute (Adult)  Goal: Identify Related Risk Factors and Signs and Symptoms  Outcome: Ongoing (interventions implemented as appropriate)  Goal: Acceptable Pain Control/Comfort Level  Outcome: Ongoing (interventions implemented as appropriate)    Problem: Perioperative Period (Adult)  Goal: Signs and Symptoms of Listed Potential Problems Will be Absent or Manageable (Perioperative Period)  Outcome: Ongoing (interventions implemented as appropriate)

## 2017-04-18 NOTE — PLAN OF CARE
Problem: Patient Care Overview (Adult)  Goal: Plan of Care Review  Outcome: Ongoing (interventions implemented as appropriate)  Goal: Adult Individualization and Mutuality  Outcome: Ongoing (interventions implemented as appropriate)  Goal: Discharge Needs Assessment  Outcome: Ongoing (interventions implemented as appropriate)    Problem: Cellulitis (Adult)  Goal: Signs and Symptoms of Listed Potential Problems Will be Absent or Manageable (Cellulitis)  Outcome: Ongoing (interventions implemented as appropriate)    Problem: Pressure Ulcer Risk (Helio Scale) (Adult,Obstetrics,Pediatric)  Goal: Identify Related Risk Factors and Signs and Symptoms  Outcome: Ongoing (interventions implemented as appropriate)  Goal: Skin Integrity  Outcome: Ongoing (interventions implemented as appropriate)    Problem: Pain, Acute (Adult)  Goal: Identify Related Risk Factors and Signs and Symptoms  Outcome: Ongoing (interventions implemented as appropriate)  Goal: Acceptable Pain Control/Comfort Level  Outcome: Ongoing (interventions implemented as appropriate)    Problem: Perioperative Period (Adult)  Goal: Signs and Symptoms of Listed Potential Problems Will be Absent or Manageable (Perioperative Period)  Outcome: Ongoing (interventions implemented as appropriate)

## 2017-04-18 NOTE — PROGRESS NOTES
"  I have personally seen and examined the patient today and discussed overnight interval progress and pertinent issues with nursing staff.    Subjective    Left foot I&D on Saturday by Dr. Morris that looks significantly better.  The wound is clean without drainage.  No fever reported but the patient had 3 loose bowel movement overnight.  Left toe culture from 4/15/2017 finalized MSSA which is the same as the culture from 4/12/2017.     Review of Systems    Constitutional: no fever, chills and night sweats. No appetite change or unexpected weight change. No fatigue.  Eyes: no eye drainage, itching or redness.  HEENT: no mouth sores, dysphagia or nose bleed.  Respiratory: no for shortness of breath, cough or production of sputum.  Cardiovascular: no chest pain, no palpitations, no orthopnea.  Gastrointestinal: no nausea, vomiting or diarrhea. No abdominal pain, hematemesis or rectal bleeding.  Genitourinary: no dysuria or polyuria.  Hematologic/lymphatic: no lymph node abnormalities, no easy bruising or easy bleeding.  Musculoskeletal: Positive for foot pain  Skin: Status post I&D of the left foot  Neurological: no loss of consciousness, no seizure, no headache.  Behavioral/Psych: no depression or suicidal ideation.  Endocrine: no hot flashes.  Immunologic: negative.      History taken from: patient chart RN      Vital Signs    /68 (BP Location: Right arm, Patient Position: Lying)  Pulse 70  Temp 98.7 °F (37.1 °C) (Oral)   Resp 20  Ht 66\" (167.6 cm)  Wt 157 lb 9 oz (71.5 kg)  SpO2 100%  BMI 25.43 kg/m2    Temp:  [98.1 °F (36.7 °C)-98.7 °F (37.1 °C)] 98.7 °F (37.1 °C)      Intake/Output Summary (Last 24 hours) at 04/18/17 1148  Last data filed at 04/18/17 0300   Gross per 24 hour   Intake             1200 ml   Output                0 ml   Net             1200 ml     Intake & Output (last 3 days)       04/15 0701 - 04/16 0700 04/16 0701 - 04/17 0700 04/17 0701 - 04/18 0700 04/18 0701 - 04/19 0700    P.O. " [id number]     I.V. (mL/kg) 500 (7)       IV Piggyback        Total Intake(mL/kg) 1580 (22.1) 720 (10.1) 1560 (21.8)     Net +1580 +720 +1560              Unmeasured Urine Occurrence 8 x 9 x 5 x     Unmeasured Stool Occurrence 5 x 3 x 1 x           Physical exam:    General Appearance:    Slightly confused, Alert, cooperative, in no acute distress, nurse at bedside   Head:    Normocephalic, without obvious abnormality, atraumatic   Eyes:            Lids and lashes normal, conjunctivae and sclerae normal, no   icterus, no pallor, corneas clear, PERRLA   Ears:    Ears appear intact with no abnormalities noted   Throat:   No oral lesions, no thrush, oral mucosa moist   Neck:   No adenopathy, supple, trachea midline, no thyromegaly, no   carotid bruit, no JVD   Back:     No tenderness to percussion or palpation, range of motion   normal   Lungs:     Clear to auscultation,respirations regular, even and unlabored. No wheezing, no ronchi and no crackles.    Heart:    Regular rhythm and normal rate, normal S1 and S2, no            murmur, no gallop, no rub, no click   Chest Wall:    No abnormalities observed   Abdomen:     Normal bowel sounds, no masses, no organomegaly, soft        non-tender, non-distended, no guarding, no rebound                tenderness   Rectal:     Deferred   Extremities:   Left foot I&D on Saturday by Dr. Morris that looks significantly better.  The wound is clean without drainage.   Pulses:   Pulses palpable and equal bilaterally   Skin:  Left foot I&D on Saturday by Dr. Morris that looks significantly better.  The wound is clean without drainage.     Lymph nodes:   No palpable adenopathy   Neurologic:   Awake, alert, slightly confused           Results:      Results from last 7 days  Lab Units 04/18/17  0029 04/16/17  0032 04/12/17  2047   WBC 10*3/mm3 5.84 5.79 8.22     Lab Results   Component Value Date    NEUTROABS 4.14 04/18/2017         Results from last 7 days  Lab Units 04/18/17  0029    CREATININE mg/dL 0.94     Results Review:    I have personally reviewed laboratory data, culture results, radiology studies and antimicrobial therapy.    Hospital Medications (active)       Dose Frequency Start End    acetaminophen (TYLENOL) tablet 487.5 mg 487.5 mg Every 4 Hours PRN 4/13/2017     Sig - Route: Take 1.5 tablets by mouth Every 4 (Four) Hours As Needed for Fever. - Oral    ALPRAZolam (XANAX) tablet 0.5 mg 0.5 mg Once 4/14/2017 4/14/2017    Sig - Route: Take 1 tablet by mouth 1 (One) Time. - Oral    atenolol (TENORMIN) tablet 25 mg 25 mg Daily 4/13/2017     Sig - Route: Take 1 tablet by mouth Daily. - Oral    cefepime (MAXIPIME) 2 g/100 mL 0.9% NS (mbp) 2 g Every 12 Hours 4/14/2017     Sig - Route: Infuse 100 mL into a venous catheter Every 12 (Twelve) Hours. - Intravenous    cetirizine (zyrTEC) tablet 10 mg 10 mg Daily 4/13/2017     Sig - Route: Take 1 tablet by mouth Daily. - Oral    dextrose (D50W) solution 25 g 25 g Every 15 Minutes PRN 4/13/2017     Sig - Route: Infuse 50 mL into a venous catheter Every 15 (Fifteen) Minutes As Needed for Low Blood Sugar (Blood Sugar Less Than 70, Patient Has IV Access - Unresponsive, NPO or Unable To Safely Swallow). - Intravenous    dextrose (GLUTOSE) oral gel 15 g 15 g Every 15 Minutes PRN 4/13/2017     Sig - Route: Take 15 g by mouth Every 15 (Fifteen) Minutes As Needed for Low Blood Sugar (Blood Sugar Less Than 70, Patient Alert, Is Not NPO & Can Safely Swallow). - Oral    FLUoxetine (PROzac) capsule 20 mg 20 mg Daily 4/13/2017     Sig - Route: Take 1 capsule by mouth Daily. - Oral    gabapentin (NEURONTIN) capsule 800 mg 800 mg Every 8 Hours Scheduled 4/13/2017     Sig - Route: Take 2 capsules by mouth Every 8 (Eight) Hours. - Oral    glucagon (human recombinant) (GLUCAGEN DIAGNOSTIC) injection 1 mg 1 mg Every 15 Minutes PRN 4/13/2017     Sig - Route: Inject 1 mg under the skin Every 15 (Fifteen) Minutes As Needed (Blood Glucose Less Than 70 - Patient  Without IV Access - Unresponsive, NPO or Unable To Safely Swallow). - Subcutaneous    HYDROcodone-acetaminophen (NORCO)  MG per tablet 1 tablet 1 tablet Every 8 Hours Scheduled 4/13/2017     Sig - Route: Take 1 tablet by mouth Every 8 (Eight) Hours. - Oral    hydrOXYzine (ATARAX) tablet 10 mg 10 mg Daily 4/13/2017     Sig - Route: Take 1 tablet by mouth Daily. - Oral    insulin aspart (novoLOG) injection 0-9 Units 0-9 Units 4 Times Daily Before Meals & Nightly 4/13/2017     Sig - Route: Inject 0-9 Units under the skin 4 (Four) Times a Day Before Meals & at Bedtime. - Subcutaneous    insulin detemir (LEVEMIR) injection 22 Units 22 Units Nightly 4/13/2017     Sig - Route: Inject 22 Units under the skin Every Night. - Subcutaneous    lidocaine PF 1% (XYLOCAINE) injection 10 mL 10 mL Once 4/12/2017     Sig - Route: Inject 10 mL as directed 1 (One) Time. - Injection    Cosign for Ordering: Accepted by Carlos Piper MD on 4/12/2017 10:11 PM    metroNIDAZOLE (FLAGYL) IVPB 500 mg 500 mg Every 8 Hours 4/13/2017     Sig - Route: Infuse 100 mL into a venous catheter Every 8 (Eight) Hours. - Intravenous    Cosign for Ordering: Accepted by Ricci Rubio MD on 4/13/2017 12:50 PM    morphine injection 2 mg 2 mg Every 2 Hours PRN 4/13/2017 4/23/2017    Sig - Route: Infuse 1 mL into a venous catheter Every 2 (Two) Hours As Needed for Severe Pain (7-10). - Intravenous    Pharmacy Meds to Bed Consult  Daily 4/13/2017     Sig - Route: Daily. - Does not apply    sodium chloride 0.9 % infusion 75 mL/hr Continuous 4/13/2017     Sig - Route: Infuse 75 mL/hr into a venous catheter Continuous. - Intravenous    temazepam (RESTORIL) capsule 30 mg 30 mg Nightly PRN 4/13/2017     Sig - Route: Take 2 capsules by mouth At Night As Needed for Sleep. - Oral    tiZANidine (ZANAFLEX) tablet 4 mg 4 mg Every 6 Hours Scheduled 4/13/2017     Sig - Route: Take 1 tablet by mouth Every 6 (Six) Hours. - Oral    vancomycin (VANCOCIN) 1,000 mg in  sodium chloride 0.9 % 250 mL IVPB 1,000 mg Every 24 Hours 4/13/2017     Sig - Route: Infuse 1,000 mg into a venous catheter Daily. - Intravenous    cefepime (MAXIPIME) 2 g/100 mL 0.9% NS (mbp) (Discontinued) 2 g Every 8 Hours 4/13/2017 4/14/2017    Sig - Route: Infuse 100 mL into a venous catheter Every 8 (Eight) Hours. - Intravenous            Cultures:    Blood Culture   Date Value Ref Range Status   04/12/2017 No growth at 24 hours  Preliminary   04/12/2017 No growth at 24 hours  Preliminary     Wound Culture   Date Value Ref Range Status   04/12/2017 No growth at 24 hours  Preliminary       PROBLEM LIST:    Patient Active Problem List   Diagnosis   • Cellulitis of foot, left       Assessment/Plan     ASSESSMENT:    1. Abscess of the foot     PLAN:    Left foot I&D on Saturday by Dr. Morirs that looks significantly better.  The wound is clean without drainage.  No fever reported but the patient had 3 loose bowel movement overnight.  Left toe culture from 4/15/2017 finalized MSSA which is the same as the culture from 4/12/2017.  The patient is tolerating high-dose IV cefazolin 2 g IV every 8 hours and if the patient is unable to do every 8 hours dosing then high-dose Rocephin or daptomycin would be adequate for the patient upon discharge.      Even though the MRI shows no osteomyelitis the patient's clinical presentation with plantar and dorsal cellulitis and bruising as well as CRP level of 30 are all indicative of acute osteomyelitis.  For now would treat as such.  IV antibiotic therapy will be recommended to continue through 5/15/2017 as the patient carries a very high morbidity and guarded limb prognosis.    In the setting of diarrhea and broad-spectrum antibiotic coverage C. difficile toxin PCR was ordered as well as Risaquad.         Patients findings and recommendations were discussed with patient and nursing staff    Code Status: Full Code     Written by Annalise Garcia PA-C, acting as scribe for   Joanne.    Annalise Garcia PA-C  04/18/17  11:48 AM    Physician Attestation:    I have personally seen and examined the patient. I reviewed the patient's data including history of present illness, review of systems, physical examination, assessment and treatment plan and agree with findings above. The assessment and plan are my own.  I have reviewed and edited the note above after discussing the findings with Annalise Garcia PA-C.    Ricci Rubio MD  Infectious Diseases  04/19/17  4:06 PM

## 2017-04-18 NOTE — PROGRESS NOTES
Antimicrobial Length of Therapy:    Day 2 cefazolin (day 7 total antibiotic therapy)    Thank you.  Amy Hairston, Pharm.D.  4/18/2017  2:30 PM

## 2017-04-18 NOTE — PLAN OF CARE
Problem: Patient Care Overview (Adult)  Goal: Plan of Care Review  Outcome: Ongoing (interventions implemented as appropriate)    04/17/17 1705 04/17/17 2100   Coping/Psychosocial Response Interventions   Plan Of Care Reviewed With --  patient   Patient Care Overview   Progress improving --        Goal: Adult Individualization and Mutuality  Outcome: Ongoing (interventions implemented as appropriate)    Problem: Cellulitis (Adult)  Goal: Signs and Symptoms of Listed Potential Problems Will be Absent or Manageable (Cellulitis)  Outcome: Ongoing (interventions implemented as appropriate)    Problem: Pressure Ulcer Risk (Helio Scale) (Adult,Obstetrics,Pediatric)  Goal: Identify Related Risk Factors and Signs and Symptoms  Outcome: Ongoing (interventions implemented as appropriate)  Goal: Skin Integrity  Outcome: Ongoing (interventions implemented as appropriate)    Problem: Pain, Acute (Adult)  Goal: Identify Related Risk Factors and Signs and Symptoms  Outcome: Ongoing (interventions implemented as appropriate)  Goal: Acceptable Pain Control/Comfort Level  Outcome: Ongoing (interventions implemented as appropriate)    Problem: Perioperative Period (Adult)  Goal: Signs and Symptoms of Listed Potential Problems Will be Absent or Manageable (Perioperative Period)  Outcome: Ongoing (interventions implemented as appropriate)

## 2017-04-19 LAB
BACTERIA SPEC AEROBE CULT: NORMAL
BACTERIA SPEC AEROBE CULT: NORMAL
CRP SERPL-MCNC: 3.18 MG/DL (ref 0–0.99)
GLUCOSE BLDC GLUCOMTR-MCNC: 174 MG/DL (ref 70–130)
GLUCOSE BLDC GLUCOMTR-MCNC: 191 MG/DL (ref 70–130)
GLUCOSE BLDC GLUCOMTR-MCNC: 193 MG/DL (ref 70–130)
GLUCOSE BLDC GLUCOMTR-MCNC: 206 MG/DL (ref 70–130)

## 2017-04-19 PROCEDURE — 63710000001 INSULIN ASPART PER 5 UNITS: Performed by: FAMILY MEDICINE

## 2017-04-19 PROCEDURE — 82962 GLUCOSE BLOOD TEST: CPT

## 2017-04-19 PROCEDURE — 86140 C-REACTIVE PROTEIN: CPT | Performed by: PODIATRIST

## 2017-04-19 PROCEDURE — 63710000001 INSULIN DETEMIR PER 5 UNITS: Performed by: FAMILY MEDICINE

## 2017-04-19 PROCEDURE — 25010000002 ENOXAPARIN PER 10 MG: Performed by: PODIATRIST

## 2017-04-19 PROCEDURE — 94799 UNLISTED PULMONARY SVC/PX: CPT

## 2017-04-19 PROCEDURE — 25010000003 CEFAZOLIN PER 500 MG: Performed by: PHYSICIAN ASSISTANT

## 2017-04-19 RX ADMIN — CETIRIZINE HYDROCHLORIDE 10 MG: 10 TABLET ORAL at 09:15

## 2017-04-19 RX ADMIN — GABAPENTIN 800 MG: 400 CAPSULE ORAL at 13:49

## 2017-04-19 RX ADMIN — SODIUM HYPOCHLORITE: 2.5 SOLUTION TOPICAL at 21:21

## 2017-04-19 RX ADMIN — SODIUM CHLORIDE 75 ML/HR: 9 INJECTION, SOLUTION INTRAVENOUS at 17:29

## 2017-04-19 RX ADMIN — HYDROCODONE BITARTRATE AND ACETAMINOPHEN 1 TABLET: 10; 325 TABLET ORAL at 05:35

## 2017-04-19 RX ADMIN — INSULIN ASPART 2 UNITS: 100 INJECTION, SOLUTION INTRAVENOUS; SUBCUTANEOUS at 17:18

## 2017-04-19 RX ADMIN — SODIUM HYPOCHLORITE: 2.5 SOLUTION TOPICAL at 09:20

## 2017-04-19 RX ADMIN — TIZANIDINE 4 MG: 4 TABLET ORAL at 05:33

## 2017-04-19 RX ADMIN — CEFAZOLIN SODIUM 2 G: 2 SOLUTION INTRAVENOUS at 21:22

## 2017-04-19 RX ADMIN — SODIUM CHLORIDE 100 ML/HR: 9 INJECTION, SOLUTION INTRAVENOUS at 02:10

## 2017-04-19 RX ADMIN — GABAPENTIN 800 MG: 400 CAPSULE ORAL at 21:21

## 2017-04-19 RX ADMIN — CEFAZOLIN SODIUM 2 G: 2 SOLUTION INTRAVENOUS at 05:33

## 2017-04-19 RX ADMIN — FLUOXETINE 20 MG: 20 CAPSULE ORAL at 09:15

## 2017-04-19 RX ADMIN — ENOXAPARIN SODIUM 40 MG: 40 INJECTION SUBCUTANEOUS at 09:15

## 2017-04-19 RX ADMIN — TIZANIDINE 4 MG: 4 TABLET ORAL at 11:57

## 2017-04-19 RX ADMIN — HYDROCODONE BITARTRATE AND ACETAMINOPHEN 1 TABLET: 10; 325 TABLET ORAL at 21:21

## 2017-04-19 RX ADMIN — INSULIN ASPART 4 UNITS: 100 INJECTION, SOLUTION INTRAVENOUS; SUBCUTANEOUS at 22:03

## 2017-04-19 RX ADMIN — INSULIN ASPART 2 UNITS: 100 INJECTION, SOLUTION INTRAVENOUS; SUBCUTANEOUS at 09:15

## 2017-04-19 RX ADMIN — ATENOLOL 25 MG: 25 TABLET ORAL at 09:14

## 2017-04-19 RX ADMIN — Medication 1 CAPSULE: at 09:14

## 2017-04-19 RX ADMIN — HYDROXYZINE HYDROCHLORIDE 10 MG: 10 TABLET ORAL at 09:15

## 2017-04-19 RX ADMIN — HYDROCODONE BITARTRATE AND ACETAMINOPHEN 1 TABLET: 10; 325 TABLET ORAL at 13:49

## 2017-04-19 RX ADMIN — INSULIN ASPART 2 UNITS: 100 INJECTION, SOLUTION INTRAVENOUS; SUBCUTANEOUS at 11:57

## 2017-04-19 RX ADMIN — HYDROCODONE BITARTRATE AND ACETAMINOPHEN 1 TABLET: 7.5; 325 TABLET ORAL at 09:19

## 2017-04-19 RX ADMIN — INSULIN DETEMIR 25 UNITS: 100 INJECTION, SOLUTION SUBCUTANEOUS at 22:04

## 2017-04-19 RX ADMIN — TIZANIDINE 4 MG: 4 TABLET ORAL at 17:18

## 2017-04-19 RX ADMIN — CEFAZOLIN SODIUM 2 G: 2 SOLUTION INTRAVENOUS at 13:49

## 2017-04-19 RX ADMIN — GABAPENTIN 800 MG: 400 CAPSULE ORAL at 05:33

## 2017-04-19 NOTE — DISCHARGE PLACEMENT REQUEST
"Reny Merino (57 y.o. Female)     Date of Birth Social Security Number Address Home Phone MRN    1960  [address] [place] [address] [phone] [id number]    [employer] Marital Status          None Single       Admission Date Admission Type Admitting Provider Attending Provider Department, Room/Bed    4/12/17 Emergency Yandel Paulson MD Watts, John Michael, MD [address], [address]/    Discharge Date Discharge Disposition Discharge Destination                      Attending Provider: Yandel Paulson MD     Allergies:  Codeine, Latex, Penicillins, Sulfa Antibiotics    Isolation:  None   Infection:  None   Code Status:  FULL    Ht:  66\" (167.6 cm)   Wt:  157 lb 9 oz (71.5 kg)    Admission Cmt:  None   Principal Problem:  None                Active Insurance as of 4/12/2017     Primary Coverage     Payor Plan Insurance Group Employer/Plan Group    [employer] [place] [employer]      Payor Plan Address Payor Plan Phone Number Effective From Effective To    [address] [address] [phone] 4/12/2017     [place] [address]       Subscriber Name Subscriber Birth Date Member ID       RENY MERINO [id number]                 Emergency Contacts      (Rel.) Home Phone Work Phone Mobile Phone    Liza Oliva (Daughter) [phone] -- --               History & Physical      H&P signed by Yandel Paulson MD at 4/16/2017  7:09 PM            ADMITTING DIAGNOSIS:  Left foot cellulitis and abscess.     HISTORY OF PRESENT ILLNESS:  The patient is a 57-year-old well-known insulin-dependent diabetic with hypertension, who presented to the emergency room with swelling in the forefoot of her left foot after having several days of swelling in her dorsal foot with some tenderness and fullness. She states that she punctured her foot a number of weeks ago outside. It was incised and drained and cultured in the emergency room and she was started on IV antibiotics. "     PAST MEDICAL HISTORY:  Multiple foot infections and surgical drainage and debridement along with osteo and gangrene of toe.     SOCIAL HISTORY:   She does not smoke or drink.     ALLERGIES:   1.  PENICILLIN.  2.  CODEINE.   3.  SULFA.     PHYSICAL EXAMINATION:  GENERAL:  She is alert, oriented, and in no acute distress.   NECK: Supple.   LUNGS: Clear.   HEART: S1 and S2.   ABDOMEN: Soft.  Left foot is currently dressed following incision and drainage with tenderness to palpation and swelling over her dorsal ball of her foot.     IMPRESSION: Abscess and cellulitis of the foot.     PLAN:  Await cultures, IV antibiotics, and may need surgical intervention.         D: PARAMJIT 04/14/2017 15:04:00 ET  T: nahomy / 04/14/2017 15:26:59 ET  Revision Count: 0  Job ID: [id number]  [id number]  cc:        Dictator Signature:___________________________     Yandel Paulson MD     Electronically signed by Yandel Paulson MD at 4/16/2017  7:09 PM        Vital Signs (last 24 hours)       04/18 0700  -  04/19 0659 04/19 0700  -  04/19 1304   Most Recent    Temp (°F) 97.7 -  98.9      97.9     97.9 (36.6)    Heart Rate 67 -  72      65     65    Resp 18 -  20       18    /55 -  179/84      141/71     141/71    SpO2 (%) 92 -  100      97     97          Intake & Output (last day)       04/18 0701 - 04/19 0700 04/19 0701 - 04/20 0700    P.O. 960     Total Intake(mL/kg) 960 (13.4)     Net +960            Unmeasured Urine Occurrence 8 x     Unmeasured Stool Occurrence 1 x         Hospital Medications (active)       Dose Frequency Start End    acetaminophen (TYLENOL) tablet 487.5 mg 487.5 mg Every 4 Hours PRN 4/13/2017     Sig - Route: Take 1.5 tablets by mouth Every 4 (Four) Hours As Needed for Fever. - Oral    atenolol (TENORMIN) tablet 25 mg 25 mg Daily 4/13/2017     Sig - Route: Take 1 tablet by mouth Daily. - Oral    ceFAZolin (ANCEF) IVPB (duplex) 2 g 2 g Every 8 Hours 4/17/2017     Sig - Route: Infuse 2,000 mg into a venous  catheter Every 8 (Eight) Hours. - Intravenous    Cosign for Ordering: Required by Ricci Rubio MD    cetirizine (zyrTEC) tablet 10 mg 10 mg Daily 4/13/2017     Sig - Route: Take 1 tablet by mouth Daily. - Oral    dextrose (D50W) solution 25 g 25 g Every 15 Minutes PRN 4/13/2017     Sig - Route: Infuse 50 mL into a venous catheter Every 15 (Fifteen) Minutes As Needed for Low Blood Sugar (Blood Sugar Less Than 70, Patient Has IV Access - Unresponsive, NPO or Unable To Safely Swallow). - Intravenous    dextrose (GLUTOSE) oral gel 15 g 15 g Every 15 Minutes PRN 4/13/2017     Sig - Route: Take 15 g by mouth Every 15 (Fifteen) Minutes As Needed for Low Blood Sugar (Blood Sugar Less Than 70, Patient Alert, Is Not NPO & Can Safely Swallow). - Oral    enoxaparin (LOVENOX) syringe 40 mg 40 mg Daily 4/18/2017     Sig - Route: Inject 0.4 mL under the skin Daily. - Subcutaneous    FLUoxetine (PROzac) capsule 20 mg 20 mg Daily 4/13/2017     Sig - Route: Take 1 capsule by mouth Daily. - Oral    gabapentin (NEURONTIN) capsule 800 mg 800 mg Every 8 Hours Scheduled 4/13/2017     Sig - Route: Take 2 capsules by mouth Every 8 (Eight) Hours. - Oral    glucagon (human recombinant) (GLUCAGEN DIAGNOSTIC) injection 1 mg 1 mg Every 15 Minutes PRN 4/13/2017     Sig - Route: Inject 1 mg under the skin Every 15 (Fifteen) Minutes As Needed (Blood Glucose Less Than 70 - Patient Without IV Access - Unresponsive, NPO or Unable To Safely Swallow). - Subcutaneous    HYDROcodone-acetaminophen (NORCO)  MG per tablet 1 tablet 1 tablet Every 8 Hours Scheduled 4/13/2017     Sig - Route: Take 1 tablet by mouth Every 8 (Eight) Hours. - Oral    HYDROcodone-acetaminophen (NORCO) 7.5-325 MG per tablet 1 tablet 1 tablet Every 4 Hours PRN 4/18/2017 4/28/2017    Sig - Route: Take 1 tablet by mouth Every 4 (Four) Hours As Needed for Moderate Pain (4-6). - Oral    hydrOXYzine (ATARAX) tablet 10 mg 10 mg Daily 4/13/2017     Sig - Route: Take 1 tablet by  "mouth Daily. - Oral    ibuprofen (ADVIL,MOTRIN) tablet 800 mg 800 mg Every 6 Hours PRN 4/18/2017     Sig - Route: Take 1 tablet by mouth Every 6 (Six) Hours As Needed for Mild Pain (1-3). - Oral    insulin aspart (novoLOG) injection 0-9 Units 0-9 Units 4 Times Daily Before Meals & Nightly 4/13/2017     Sig - Route: Inject 0-9 Units under the skin 4 (Four) Times a Day Before Meals & at Bedtime. - Subcutaneous    insulin detemir (LEVEMIR) injection 25 Units 25 Units Nightly 4/16/2017     Sig - Route: Inject 25 Units under the skin Every Night. - Subcutaneous    labetalol (NORMODYNE,TRANDATE) injection 20 mg 20 mg Every 6 Hours PRN 4/16/2017     Sig - Route: Infuse 4 mL into a venous catheter Every 6 (Six) Hours As Needed for High Blood Pressure. - Intravenous    lidocaine PF 1% (XYLOCAINE) injection 10 mL 10 mL Once 4/12/2017     Sig - Route: Inject 10 mL as directed 1 (One) Time. - Injection    Cosign for Ordering: Accepted by Carlos Piper MD on 4/12/2017 10:11 PM    morphine injection 1 mg 1 mg Every 4 Hours PRN 4/18/2017 4/28/2017    Sig - Route: Infuse 0.5 mL into a venous catheter Every 4 (Four) Hours As Needed for Moderate Pain (4-6). - Intravenous    Linked Group 1:  \"And\" Linked Group Details        morphine injection 2 mg 2 mg Every 2 Hours PRN 4/13/2017 4/23/2017    Sig - Route: Infuse 1 mL into a venous catheter Every 2 (Two) Hours As Needed for Severe Pain (7-10). - Intravenous    naloxone (NARCAN) injection 0.4 mg 0.4 mg Every 5 Minutes PRN 4/18/2017     Sig - Route: Infuse 1 mL into a venous catheter Every 5 (Five) Minutes As Needed for Respiratory Depression. - Intravenous    Linked Group 1:  \"And\" Linked Group Details        naloxone (NARCAN) injection 0.4 mg 0.4 mg Every 5 Minutes PRN 4/15/2017     Sig - Route: Infuse 1 mL into a venous catheter Every 5 (Five) Minutes As Needed for Respiratory Depression. - Intravenous    Linked Group 2:  \"And\" Linked Group Details        ondansetron (ZOFRAN) " injection 4 mg 4 mg Every 6 Hours PRN 4/16/2017     Sig - Route: Infuse 2 mL into a venous catheter Every 6 (Six) Hours As Needed for Nausea or Vomiting. - Intravenous    ondansetron (ZOFRAN) tablet 4 mg 4 mg Every 6 Hours PRN 4/16/2017     Sig - Route: Take 1 tablet by mouth Every 6 (Six) Hours As Needed for Nausea or Vomiting. - Oral    Pharmacy Meds to Bed Consult  Daily 4/13/2017     Sig - Route: Daily. - Does not apply    promethazine (PHENERGAN) 12.5 mg in sodium chloride 0.9 % 50 mL 12.5 mg Every 4 Hours PRN 4/18/2017     Sig - Route: Infuse 12.5 mg into a venous catheter Every 4 (Four) Hours As Needed for Nausea. - Intravenous    promethazine (PHENERGAN) 12.5 mg in sodium chloride 0.9 % 50 mL 12.5 mg Every 4 Hours PRN 4/15/2017     Sig - Route: Infuse 12.5 mg into a venous catheter Every 4 (Four) Hours As Needed for Nausea. - Intravenous    Risaquad-2 capsule 1 capsule 1 capsule Daily 4/18/2017     Sig - Route: Take 1 capsule by mouth Daily. - Oral    sodium chloride 0.9 % infusion 75 mL/hr Continuous 4/13/2017     Sig - Route: Infuse 75 mL/hr into a venous catheter Continuous. - Intravenous    sodium chloride 0.9 % infusion 100 mL/hr Continuous 4/18/2017     Sig - Route: Infuse 100 mL/hr into a venous catheter Continuous. - Intravenous    sodium chloride 0.9 % infusion 100 mL/hr Continuous 4/15/2017     Sig - Route: Infuse 100 mL/hr into a venous catheter Continuous. - Intravenous    sodium hypochlorite (DAKIN'S) topical solution 0.25% (half strength)  Every 12 Hours 4/16/2017     Sig - Route: Irrigate with  as directed Every 12 (Twelve) Hours. - Irrigation    temazepam (RESTORIL) capsule 30 mg 30 mg Nightly PRN 4/13/2017     Sig - Route: Take 2 capsules by mouth At Night As Needed for Sleep. - Oral    tiZANidine (ZANAFLEX) tablet 4 mg 4 mg Every 6 Hours Scheduled 4/13/2017     Sig - Route: Take 1 tablet by mouth Every 6 (Six) Hours. - Oral            Lab Results (last 24 hours)     Procedure Component  Value Units Date/Time    Blood Culture [[id number]]  (Normal) Collected:  04/16/17 1407    Specimen:  Blood from Arm, Right Updated:  04/18/17 1501     Blood Culture No growth at 2 days    Blood Culture [[id number]]  (Normal) Collected:  04/14/17 1429    Specimen:  Blood from Arm, Right Updated:  04/18/17 1501     Blood Culture No growth at 4 days    Blood Culture [[id number]]  (Normal) Collected:  04/14/17 1519    Specimen:  Blood from Arm, Right Updated:  04/18/17 1601     Blood Culture No growth at 4 days    POC Glucose Fingerstick [[id number]]  (Abnormal) Collected:  04/18/17 1648    Specimen:  Blood Updated:  04/18/17 1652     Glucose 233 (H) mg/dL     Narrative:       Meter: [id number] : [id number] chucho jones    Blood Culture [[id number]]  (Normal) Collected:  04/16/17 1522    Specimen:  Blood from Arm, Right Updated:  04/18/17 1701     Blood Culture No growth at 2 days    POC Glucose Fingerstick [[id number]]  (Abnormal) Collected:  04/18/17 2046    Specimen:  Blood Updated:  04/18/17 2110     Glucose 204 (H) mg/dL     Narrative:       Meter: [id number] : [id number] harvey fermin    POC Glucose Fingerstick [[id number]]  (Abnormal) Collected:  04/19/17 0753    Specimen:  Blood Updated:  04/19/17 0758     Glucose 193 (H) mg/dL     Narrative:       Meter: [id number] : [id number] chucho jones    POC Glucose Fingerstick [[id number]]  (Abnormal) Collected:  04/19/17 1123    Specimen:  Blood Updated:  04/19/17 1126     Glucose 191 (H) mg/dL     Narrative:       Meter: [id number] : [id number]        Imaging Results (last 24 hours)     ** No results found for the last 24 hours. **        Orders (last 24 hrs)     Start     Ordered    04/19/17 1223  Inpatient Consult to Case Management   Once     Provider:  (Not yet assigned)    04/19/17 1223    04/19/17 1132  Inpatient Consult For PICC  Once     Comments:  After Line Placement, Flushes and Line Care Should Be Ordered Using the Line Care  (Flushes & Dressing Changes) - All Line Types Order Set   Provider:  (Not yet assigned)    04/19/17 1131    04/19/17 1127  POC Glucose Fingerstick  Once      04/19/17 1126    04/19/17 0759  POC Glucose Fingerstick  Once      04/19/17 0758    04/19/17 0715  Inpatient Consult to Diabetes Educator  Once     Comments:  Glucose > 400 when admitted   Provider:  (Not yet assigned)    04/19/17 0715    04/18/17 2110  POC Glucose Fingerstick  Once      04/18/17 2109    04/18/17 1653  POC Glucose Fingerstick  Once      04/18/17 1652    04/18/17 1100  Risaquad-2 capsule 1 capsule  Daily      04/18/17 0958    04/18/17 0100  sodium chloride 0.9 % infusion  Continuous      04/18/17 0016    04/18/17 0016  ibuprofen (ADVIL,MOTRIN) tablet 800 mg  Every 6 Hours PRN      04/18/17 0016    04/18/17 0016  HYDROcodone-acetaminophen (NORCO) 7.5-325 MG per tablet 1 tablet  Every 4 Hours PRN      04/18/17 0016    04/18/17 0016  morphine injection 1 mg  Every 4 Hours PRN      04/18/17 0016    04/18/17 0016  naloxone (NARCAN) injection 0.4 mg  Every 5 Minutes PRN      04/18/17 0016    04/18/17 0016  promethazine (PHENERGAN) 12.5 mg in sodium chloride 0.9 % 50 mL  Every 4 Hours PRN      04/18/17 0016    04/18/17 0016  enoxaparin (LOVENOX) syringe 40 mg  Daily      04/18/17 0016    04/17/17 1300  ceFAZolin (ANCEF) IVPB (duplex) 2 g  Every 8 Hours      04/17/17 1214    04/16/17 2100  insulin detemir (LEVEMIR) injection 25 Units  Nightly      04/16/17 0655    04/16/17 2000  sodium hypochlorite (DAKIN'S) topical solution 0.25% (half strength)  Every 12 Hours      04/16/17 1212    04/16/17 1349  labetalol (NORMODYNE,TRANDATE) injection 20 mg  Every 6 Hours PRN      04/16/17 1349    04/16/17 0653  ondansetron (ZOFRAN) tablet 4 mg  Every 6 Hours PRN      04/16/17 0655    04/16/17 0653  ondansetron (ZOFRAN) injection 4 mg  Every 6 Hours PRN      04/16/17 0655    04/15/17 1145  sodium chloride 0.9 % infusion  Continuous      04/15/17 1102    04/15/17  1102  naloxone (NARCAN) injection 0.4 mg  Every 5 Minutes PRN      04/15/17 1102    04/15/17 1102  promethazine (PHENERGAN) 12.5 mg in sodium chloride 0.9 % 50 mL  Every 4 Hours PRN      04/15/17 1102    04/13/17 1700  POC Glucose Fingerstick  4 Times Daily Before Meals & at Bedtime      04/13/17 1117    04/13/17 1200  atenolol (TENORMIN) tablet 25 mg  Daily      04/13/17 0900    04/13/17 1200  cetirizine (zyrTEC) tablet 10 mg  Daily      04/13/17 0900    04/13/17 1200  FLUoxetine (PROzac) capsule 20 mg  Daily      04/13/17 0900    04/13/17 1200  gabapentin (NEURONTIN) capsule 800 mg  Every 8 Hours Scheduled      04/13/17 0900    04/13/17 1200  HYDROcodone-acetaminophen (NORCO)  MG per tablet 1 tablet  Every 8 Hours Scheduled      04/13/17 0900    04/13/17 1200  hydrOXYzine (ATARAX) tablet 10 mg  Daily      04/13/17 0900    04/13/17 1200  tiZANidine (ZANAFLEX) tablet 4 mg  Every 6 Hours Scheduled      04/13/17 0900    04/13/17 1200  dextrose (GLUTOSE) oral gel 15 g  Every 15 Minutes PRN      04/13/17 1117    04/13/17 1200  dextrose (D50W) solution 25 g  Every 15 Minutes PRN      04/13/17 1117    04/13/17 1200  glucagon (human recombinant) (GLUCAGEN DIAGNOSTIC) injection 1 mg  Every 15 Minutes PRN      04/13/17 1117    04/13/17 1200  insulin aspart (novoLOG) injection 0-9 Units  4 Times Daily Before Meals & Nightly      04/13/17 1117    04/13/17 0800  Pharmacy Meds to Bed Consult  Daily      04/13/17 0129    04/13/17 0703  acetaminophen (TYLENOL) tablet 487.5 mg  Every 4 Hours PRN      04/13/17 0705    04/13/17 0115  temazepam (RESTORIL) capsule 30 mg  Nightly PRN      04/13/17 0115    04/13/17 0045  sodium chloride 0.9 % infusion  Continuous      04/13/17 0007    04/13/17 0010  morphine injection 2 mg  Every 2 Hours PRN      04/13/17 0010    04/12/17 2132  lidocaine PF 1% (XYLOCAINE) injection 10 mL  Once      04/12/17 2130    Unscheduled  Apply ice to affected area  As Needed      04/18/17 0016    Unscheduled   Apply ice to affected area  As Needed      04/15/17 1102    --  insulin glargine (LANTUS) 100 UNIT/ML injection  Nightly      04/12/17 1945    --  insulin lispro (humaLOG) 100 UNIT/ML injection  3 Times Daily Before Meals      04/12/17 1945    --  tiZANidine (ZANAFLEX) 4 MG tablet  4 Times Daily      04/12/17 1945    --  FLUoxetine (PROzac) 20 MG capsule  Daily      04/12/17 1945    --  hydrOXYzine (ATARAX) 10 MG tablet  Daily      04/12/17 1945    --  temazepam (RESTORIL) 30 MG capsule  Nightly PRN      04/12/17 1945    --  atenolol (TENORMIN) 25 MG tablet  Daily      04/13/17 0030    --  cetirizine (zyrTEC) 10 MG tablet  Daily      04/13/17 0030    --  gabapentin (NEURONTIN) 800 MG tablet  3 Times Daily      04/13/17 0030    --  HYDROcodone-acetaminophen (NORCO)  MG per tablet  3 Times Daily      04/13/17 0030    Signed and Held  sodium chloride 0.9 % flush 1-10 mL  As Needed,   Status:  Canceled      Signed and Held    Signed and Held  lactated ringers infusion  Continuous,   Status:  Canceled      Signed and Held    Signed and Held  sodium chloride 0.9 % flush 1-10 mL  As Needed,   Status:  Canceled      Signed and Held    Signed and Held  lactated ringers infusion  Continuous,   Status:  Canceled      Signed and Held             Operative/Procedure Notes (most recent note)      Basilio Morris MD at 4/15/2017  9:00 AM  Version 1 of 1             INCISION AND DRAINAGE LOWER EXTREMITY  Procedure Note    Reny GOMEZ Kassy  4/12/2017 - 4/15/2017      Pre-op Diagnosis:   Cellulitis of foot, left [L03.116]    Post-op Diagnosis:     Post-Op Diagnosis Codes:     * Cellulitis of foot, left [L03.116]    Procedure(s):  1. Incision and Drainage Abcess Subcutaneous, Left Foot  2. Excisional Debridement Necrotic Tissue Plantar and Medial First MTPJ Wounds, Left foot     Surgeon(s):  Basilio Morris MD    Anesthesia: Choice    Staff:   Circulator: Brii Andino RN  Scrub Person: Lizzy Pugh  Assistant: Curtis  Josh Arlinrashad    HEMOSTASIS:PCT, 250mmhg    Estimated Blood Loss: * No values recorded between 4/15/2017  8:03 AM and 4/15/2017  8:50 AM *    Specimens:                  Order Name Source Comment Collection Info Order Time   CULTURE, ROUTINE Toe, Left  Collected By: Basilio Morris MD 4/15/2017  8:43 AM   CULTURE, ROUTINE Toe, Left  Collected By: Basilio Morris MD 4/15/2017  8:43 AM   TISSUE EXAM Toe, Left  Collected By: Basilio Morris MD 4/15/2017  8:43 AM       Grafts/Implants:none     Injectables:.5% Marcaine Plain      Drains:           Findings: Abcess first Webspace, abcess medial and plantar central subq first MTPJ, Left foot     Complications: None   Procedure(s):    1. Incision and Drainage Abcess Subcutaneous, Left Foot.   Under IV sedation the patient was brought to the OR placed in operative table in the supine position.  The extremity was scrubbed prepped and draped in usual sterile manner.  The limb was elevated disseminated pneumatic tourniquet for approximately 250 mmHg pressure at the calf level.  Attention was then directed to the left lower extremity where the incision was made on the plantar wound where a Rika hemostat was used for dissection of the subcutaneous tissue abscess where abscess of the first webspace plantar central and medial first met jet a were decompressed and evacuated of infectious serosanguineous fluid.  This area was fully irrigated with 2 L vancomycin infiltrated saline pulse lavage irrigation with light curettage.  The 3 mm plantar first MTP joint wound seen to sinus tract through the subcutaneous tissue to the medial first MPJ exit wound/abscess.  After thorough irrigation of all abscess voids and sinus tracts this was packed with quarter-inch string gauze soaked in quarter strength Dakin solution.    2. Excisional Debridement Necrotic Tissue Plantar and Medial First MTPJ Wounds, Left foot .  The nonviable sloughing necrotic tissue surrounding the entire first MTP joint  was sharply excisionally debrided with iris scissors pickups, the sloughing necrotic tissue attached was curettaged to normal tissue layer.  The necrotic tissue within the plantar and medial first MTP joint wounds was sharply excised with iris scissors pickups and Leonid.  This tissue sent for C&S and Gram stain.  Povidone Dressing was applied to the macerated skin first MPJ area bulky dressing was applied.  A 10 cc point percent Marcaine plain lopez block was performed proximal to the infectious area postoperatively. Tourniquet released vascular response nourished extremity patient transferred or PACU vessel stable neurovascular status intact extremity irritation    Basilio Morris MD     Date: 4/15/2017  Time: 9:01 AM     Electronically signed by Basilio Morris MD at 4/15/2017  9:11 AM           Physician Progress Notes (most recent note)      Annalise Garcia PA-C at 4/19/2017 11:02 AM  Version 1 of 1           I have personally seen and examined the patient today and discussed overnight interval progress and pertinent issues with nursing staff.    Subjective    She continues to do well on IV cefazolin with no complaints and no issues reported by her nurse.  Normal white count.    Review of Systems    Constitutional: no fever, chills and night sweats. No appetite change or unexpected weight change. No fatigue.  Eyes: no eye drainage, itching or redness.  HEENT: no mouth sores, dysphagia or nose bleed.  Respiratory: no for shortness of breath, cough or production of sputum.  Cardiovascular: no chest pain, no palpitations, no orthopnea.  Gastrointestinal: no nausea, vomiting or diarrhea. No abdominal pain, hematemesis or rectal bleeding.  Genitourinary: no dysuria or polyuria.  Hematologic/lymphatic: no lymph node abnormalities, no easy bruising or easy bleeding.  Musculoskeletal: Positive for foot pain  Skin: Status post I&D of the left foot  Neurological: no loss of consciousness, no seizure, no  "headache.  Behavioral/Psych: no depression or suicidal ideation.  Endocrine: no hot flashes.  Immunologic: negative.      History taken from: patient chart RN      Vital Signs    /82 (BP Location: Left arm, Patient Position: Lying)  Pulse 67  Temp 98.5 °F (36.9 °C) (Oral)   Resp 18  Ht 66\" (167.6 cm)  Wt 157 lb 9 oz (71.5 kg)  SpO2 97%  BMI 25.43 kg/m2    Temp:  [97.7 °F (36.5 °C)-98.9 °F (37.2 °C)] 98.5 °F (36.9 °C)      Intake/Output Summary (Last 24 hours) at 04/19/17 1102  Last data filed at 04/19/17 0300   Gross per 24 hour   Intake              960 ml   Output                0 ml   Net              960 ml     Intake & Output (last 3 days)       04/16 0701 - 04/17 0700 04/17 0701 - 04/18 0700 04/18 0701 - 04/19 0700 04/19 0701 - 04/20 0700    P.O. 720 1560 960     I.V. (mL/kg)        Total Intake(mL/kg) 720 (10.1) 1560 (21.8) 960 (13.4)     Net +720 +1560 +960              Unmeasured Urine Occurrence 9 x 5 x 8 x     Unmeasured Stool Occurrence 3 x 1 x 1 x           Physical exam:    General Appearance:    Slightly confused, Alert, cooperative, in no acute distress, nurse at bedside   Head:    Normocephalic, without obvious abnormality, atraumatic   Eyes:            Lids and lashes normal, conjunctivae and sclerae normal, no   icterus, no pallor, corneas clear, PERRLA   Ears:    Ears appear intact with no abnormalities noted   Throat:   No oral lesions, no thrush, oral mucosa moist   Neck:   No adenopathy, supple, trachea midline, no thyromegaly, no   carotid bruit, no JVD   Back:     No tenderness to percussion or palpation, range of motion   normal   Lungs:     Clear to auscultation,respirations regular, even and unlabored. No wheezing, no ronchi and no crackles.    Heart:    Regular rhythm and normal rate, normal S1 and S2, no            murmur, no gallop, no rub, no click   Chest Wall:    No abnormalities observed   Abdomen:     Normal bowel sounds, no masses, no organomegaly, soft        " non-tender, non-distended, no guarding, no rebound                tenderness   Rectal:     Deferred   Extremities:   Left foot I&D on Saturday by Dr. Morris that looks significantly better.  The wound is clean without drainage.   Pulses:   Pulses palpable and equal bilaterally   Skin:  Left foot I&D on Saturday by Dr. Morris that looks significantly better.  The wound is clean without drainage.     Lymph nodes:   No palpable adenopathy   Neurologic:   Awake, alert, slightly confused           Results:      Results from last 7 days  Lab Units 04/18/17  0029 04/16/17  0032 04/12/17  2047   WBC 10*3/mm3 5.84 5.79 8.22     Lab Results   Component Value Date    NEUTROABS 4.14 04/18/2017         Results from last 7 days  Lab Units 04/18/17  0029   CREATININE mg/dL 0.94     Results Review:    I have personally reviewed laboratory data, culture results, radiology studies and antimicrobial therapy.    Hospital Medications (active)       Dose Frequency Start End    acetaminophen (TYLENOL) tablet 487.5 mg 487.5 mg Every 4 Hours PRN 4/13/2017     Sig - Route: Take 1.5 tablets by mouth Every 4 (Four) Hours As Needed for Fever. - Oral    ALPRAZolam (XANAX) tablet 0.5 mg 0.5 mg Once 4/14/2017 4/14/2017    Sig - Route: Take 1 tablet by mouth 1 (One) Time. - Oral    atenolol (TENORMIN) tablet 25 mg 25 mg Daily 4/13/2017     Sig - Route: Take 1 tablet by mouth Daily. - Oral    cefepime (MAXIPIME) 2 g/100 mL 0.9% NS (mbp) 2 g Every 12 Hours 4/14/2017     Sig - Route: Infuse 100 mL into a venous catheter Every 12 (Twelve) Hours. - Intravenous    cetirizine (zyrTEC) tablet 10 mg 10 mg Daily 4/13/2017     Sig - Route: Take 1 tablet by mouth Daily. - Oral    dextrose (D50W) solution 25 g 25 g Every 15 Minutes PRN 4/13/2017     Sig - Route: Infuse 50 mL into a venous catheter Every 15 (Fifteen) Minutes As Needed for Low Blood Sugar (Blood Sugar Less Than 70, Patient Has IV Access - Unresponsive, NPO or Unable To Safely Swallow). - Intravenous     dextrose (GLUTOSE) oral gel 15 g 15 g Every 15 Minutes PRN 4/13/2017     Sig - Route: Take 15 g by mouth Every 15 (Fifteen) Minutes As Needed for Low Blood Sugar (Blood Sugar Less Than 70, Patient Alert, Is Not NPO & Can Safely Swallow). - Oral    FLUoxetine (PROzac) capsule 20 mg 20 mg Daily 4/13/2017     Sig - Route: Take 1 capsule by mouth Daily. - Oral    gabapentin (NEURONTIN) capsule 800 mg 800 mg Every 8 Hours Scheduled 4/13/2017     Sig - Route: Take 2 capsules by mouth Every 8 (Eight) Hours. - Oral    glucagon (human recombinant) (GLUCAGEN DIAGNOSTIC) injection 1 mg 1 mg Every 15 Minutes PRN 4/13/2017     Sig - Route: Inject 1 mg under the skin Every 15 (Fifteen) Minutes As Needed (Blood Glucose Less Than 70 - Patient Without IV Access - Unresponsive, NPO or Unable To Safely Swallow). - Subcutaneous    HYDROcodone-acetaminophen (NORCO)  MG per tablet 1 tablet 1 tablet Every 8 Hours Scheduled 4/13/2017     Sig - Route: Take 1 tablet by mouth Every 8 (Eight) Hours. - Oral    hydrOXYzine (ATARAX) tablet 10 mg 10 mg Daily 4/13/2017     Sig - Route: Take 1 tablet by mouth Daily. - Oral    insulin aspart (novoLOG) injection 0-9 Units 0-9 Units 4 Times Daily Before Meals & Nightly 4/13/2017     Sig - Route: Inject 0-9 Units under the skin 4 (Four) Times a Day Before Meals & at Bedtime. - Subcutaneous    insulin detemir (LEVEMIR) injection 22 Units 22 Units Nightly 4/13/2017     Sig - Route: Inject 22 Units under the skin Every Night. - Subcutaneous    lidocaine PF 1% (XYLOCAINE) injection 10 mL 10 mL Once 4/12/2017     Sig - Route: Inject 10 mL as directed 1 (One) Time. - Injection    Cosign for Ordering: Accepted by Carlos Piper MD on 4/12/2017 10:11 PM    metroNIDAZOLE (FLAGYL) IVPB 500 mg 500 mg Every 8 Hours 4/13/2017     Sig - Route: Infuse 100 mL into a venous catheter Every 8 (Eight) Hours. - Intravenous    Cosign for Ordering: Accepted by Ricci Rubio MD on 4/13/2017 12:50 PM    morphine  injection 2 mg 2 mg Every 2 Hours PRN 4/13/2017 4/23/2017    Sig - Route: Infuse 1 mL into a venous catheter Every 2 (Two) Hours As Needed for Severe Pain (7-10). - Intravenous    Pharmacy Meds to Bed Consult  Daily 4/13/2017     Sig - Route: Daily. - Does not apply    sodium chloride 0.9 % infusion 75 mL/hr Continuous 4/13/2017     Sig - Route: Infuse 75 mL/hr into a venous catheter Continuous. - Intravenous    temazepam (RESTORIL) capsule 30 mg 30 mg Nightly PRN 4/13/2017     Sig - Route: Take 2 capsules by mouth At Night As Needed for Sleep. - Oral    tiZANidine (ZANAFLEX) tablet 4 mg 4 mg Every 6 Hours Scheduled 4/13/2017     Sig - Route: Take 1 tablet by mouth Every 6 (Six) Hours. - Oral    vancomycin (VANCOCIN) 1,000 mg in sodium chloride 0.9 % 250 mL IVPB 1,000 mg Every 24 Hours 4/13/2017     Sig - Route: Infuse 1,000 mg into a venous catheter Daily. - Intravenous    cefepime (MAXIPIME) 2 g/100 mL 0.9% NS (mbp) (Discontinued) 2 g Every 8 Hours 4/13/2017 4/14/2017    Sig - Route: Infuse 100 mL into a venous catheter Every 8 (Eight) Hours. - Intravenous            Cultures:    Blood Culture   Date Value Ref Range Status   04/12/2017 No growth at 24 hours  Preliminary   04/12/2017 No growth at 24 hours  Preliminary     Wound Culture   Date Value Ref Range Status   04/12/2017 No growth at 24 hours  Preliminary       PROBLEM LIST:    Patient Active Problem List   Diagnosis   • Cellulitis of foot, left       Assessment/Plan     ASSESSMENT:    1. Abscess of the foot     PLAN:    The patient is tolerating high-dose IV cefazolin 2 g IV every 8 hours and if the patient is unable to do every 8 hours dosing then high-dose Rocephin or daptomycin would be adequate for the patient upon discharge through 5/15/2017.    Left foot I&D on Saturday by Dr. Morris that looks significantly better.  The wound is clean without drainage.   Left toe culture from 4/15/2017 finalized MSSA which is the same as the culture from 4/12/2017.         Even though the MRI shows no osteomyelitis the patient's clinical presentation with plantar and dorsal cellulitis and bruising as well as CRP level of 30 are all indicative of acute osteomyelitis.  For now would treat as such.  IV antibiotic therapy will be recommended to continue through 5/15/2017 as the patient carries a very high morbidity and guarded limb prognosis.    The patient is okay to discharge from infectious disease standpoint on IV antibiotic therapy.    In the setting of diarrhea and broad-spectrum antibiotic coverage C. difficile toxin PCR was ordered as well as Risaquad.         Patients findings and recommendations were discussed with patient and nursing staff    Code Status: Full Code     Written by Annalise Garcia PA-C, acting as scribe for Dr. Rubio.    Annalise Garcia PA-C  04/19/17  11:02 AM         Electronically signed by Annalise Garcia PA-C at 4/19/2017 11:05 AM           Consult Notes (most recent note)      Basilio Morris MD at 4/14/2017  2:17 PM  Version 1 of 1     Consult Orders:    1. Inpatient Consult to Podiatry [[id number]] ordered by Ricci Rubio MD at 04/14/17 0851                  PODIATRIC SURGERY CONSULTATION REPORT      Referring Provider: Lorene  Reason for Consultation: Left foot infection Gas Gangrene, Abcess rule out OM      Principal problem: <principal problem not specified>    Subjective .        History of present illness:   Ms. Elena, 57 y.o. years old female with past medical history significant for diabetes,hypertension presented to [facility] Emergency Department on 4/12/2017 for left foot pain and blister formation with redness and swelling after she punctured her foot while playing with her grandson a few weeks ago. She developed a fever of 101.5 and worsening symptoms which prompted her to come to the ED. Denies vomiting or fever. Podiatric Surgery consultation was requested for lower extremity management. History taken from:  "patientVibha RN, Case was discussed with patient and nursing staff     Review of Systems     Constitutional: See history of present illness, confusion   Eyes: no eye drainage, itching or redness.  HEENT: no mouth sores, dysphagia or nose bleed.  Respiratory: no for shortness of breath, cough or production of sputum.  Cardiovascular: no chest pain, no palpitations, no orthopnea.  Gastrointestinal: no nausea, vomiting or diarrhea. No abdominal pain, hematemesis or rectal bleeding.  Genitourinary: no dysuria or polyuria.  Hematologic/lymphatic: no lymph node abnormalities, no easy bruising or easy bleeding.  Musculoskeletal: no muscle or joint pain.  Skin: See history of present illness  Neurological: no loss of consciousness, no seizure, no headache.  Behavioral/Psych: no depression or suicidal ideation.  Endocrine: no hot flashes.  Immunologic: negative.     Past Medical History      Medical History         Past Medical History:   Diagnosis Date   • Arthritis     • Depression     • Diabetes mellitus     • Hypertension              Past Surgical History      Surgical History          Past Surgical History:   Procedure Laterality Date   • BACK SURGERY       • CATARACT EXTRACTION         Left    •  SECTION       • DENTAL PROCEDURE       • HYSTERECTOMY       • TOE AMPUTATION       • TUBAL ABDOMINAL LIGATION                Family History           Family History   Problem Relation Age of Onset   • Heart disease Mother     • Cancer Mother     • COPD Mother           Social History          Social History   Substance Use Topics   • Smoking status: Never Smoker   • Smokeless tobacco: None   • Alcohol use No         Allergies     Codeine; Penicillins; and Sulfa antibiotics        Objective          /51 (BP Location: Right arm, Patient Position: Lying)  Pulse 76  Temp (!) 101.5 °F (38.6 °C) (Oral)  Resp 18  Ht 66\" (167.6 cm)  Wt 157 lb 9 oz (71.5 kg)  SpO2 97%  BMI 25.43 kg/m2     Temp: [98 °F (36.7 " °C)-101.5 °F (38.6 °C)] 101.5 °F (38.6 °C)           Intake/Output Summary (Last 24 hours) at 04/13/17 0949  Last data filed at 04/13/17 0300    Gross per 24 hour   Intake 0 ml   Output 0 ml   Net 0 ml            Physical Exam:      General Appearance:  Alert but seems confusedLe, cooperative, in no acute distress   Head:  Normocephalic, without obvious abnormality, atraumatic   Eyes:      Lids and lashes normal, conjunctivae and sclerae normal, no icterus, no pallor, corneas clear, PERRLA   Ears:  Ears appear intact with no abnormalities noted   Throat: No oral lesions, no thrush, oral mucosa moist   Neck: No adenopathy, supple, trachea midline, no thyromegaly, no carotid bruit, no JVD   Back:  No tenderness to percussion or palpation, range of motion normal   Lungs:  Clear to auscultation,respirations regular, even and unlabored. No wheezing, no ronchi and no crackles.   Heart:  Regular rhythm and normal rate, normal S1 and S2, no murmur, no gallop, no rub, no click   Chest Wall:  No abnormalities observed   Abdomen:  Normal bowel sounds, no masses, no organomegaly, soft non-tender, non-distended, no guarding, no rebound tenderness   Rectal:  Deferred   Extremities: Left foot necrotic sloughing tissue entire first mtpj dorsum, medial and plantar with extensive erythema, edema, warmth. Mild crepitus with Range of motion first mtpj. Erythema extends tip hallux over 2nd and 3rd mtpj.  Clinical Gas Gangrene. Plantar 2mm puncture wound with drainaing purulent fluid.    Pulses: Pulses palpable and equal bilaterally   Skin: status post left foot I&D with erythema and drainage.    Lymph nodes: No palpable adenopathy   Neurologic: Awake, alert and oriented x 3. Following commands.         Results:        Results from last 7 days  Lab Units 04/12/17 2047   WBC 10*3/mm3 8.22            Lab Results   Component Value Date     NEUTROABS 5.84 04/12/2017            Results from last 7 days  Lab Units 04/12/17 2047   CREATININE  mg/dL 1.78*          Imaging Results (last 24 hours)     Procedure Component Value Units Date/Time     XR Foot 2 View Left [[id number]] Updated: 04/12/17 2118            Results Review:  I have personally reviewed laboratory data, culture results, radiology studies and antimicrobial therapy.     Hospital Medications (active)        Dose Frequency Start End     acetaminophen (TYLENOL) tablet 487.5 mg 487.5 mg Every 4 Hours PRN 4/13/2017       Sig - Route: Take 1.5 tablets by mouth Every 4 (Four) Hours As Needed for Fever. - Oral     atenolol (TENORMIN) tablet 25 mg 25 mg Daily 4/13/2017       Sig - Route: Take 1 tablet by mouth Daily. - Oral     cefepime (MAXIPIME) 2 g/100 mL 0.9% NS (mbp) 2 g Every 8 Hours 4/13/2017       Sig - Route: Infuse 100 mL into a venous catheter Every 8 (Eight) Hours. - Intravenous     cetirizine (zyrTEC) tablet 10 mg 10 mg Daily 4/13/2017       Sig - Route: Take 1 tablet by mouth Daily. - Oral     clindamycin (CLEOCIN) 900 mg in dextrose 5% 50 mL IVPB (premix) 900 mg Once 4/12/2017 4/12/2017     Sig - Route: Infuse 50 mL into a venous catheter 1 (One) Time. - Intravenous     FLUoxetine (PROzac) capsule 20 mg 20 mg Daily 4/13/2017       Sig - Route: Take 1 capsule by mouth Daily. - Oral     gabapentin (NEURONTIN) capsule 800 mg 800 mg Every 8 Hours Scheduled 4/13/2017       Sig - Route: Take 2 capsules by mouth Every 8 (Eight) Hours. - Oral     HYDROcodone-acetaminophen (NORCO)  MG per tablet 1 tablet 1 tablet Every 8 Hours Scheduled 4/13/2017       Sig - Route: Take 1 tablet by mouth Every 8 (Eight) Hours. - Oral     hydrOXYzine (ATARAX) tablet 10 mg 10 mg Daily 4/13/2017       Sig - Route: Take 1 tablet by mouth Daily. - Oral     insulin detemir (LEVEMIR) injection 22 Units 22 Units Nightly 4/13/2017       Sig - Route: Inject 22 Units under the skin Every Night. - Subcutaneous     insulin regular (humuLIN R,novoLIN R) injection 12 Units 12 Units Once 4/12/2017 4/12/2017     Sig -  Route: Inject 12 Units under the skin 1 (One) Time. - Subcutaneous     lidocaine PF 1% (XYLOCAINE) injection 10 mL 10 mL Once 4/12/2017       Sig - Route: Inject 10 mL as directed 1 (One) Time. - Injection     Cosign for Ordering: Accepted by Carlos Piper MD on 4/12/2017 10:11 PM     morphine injection 2 mg 2 mg Every 2 Hours PRN 4/13/2017 4/23/2017     Sig - Route: Infuse 1 mL into a venous catheter Every 2 (Two) Hours As Needed for Severe Pain (7-10). - Intravenous     Pharmacy Meds to Bed Consult   Daily 4/13/2017       Sig - Route: Daily. - Does not apply     sodium chloride 0.9 % bolus 1,000 mL 1,000 mL Once 4/12/2017 4/12/2017     Sig - Route: Infuse 1,000 mL into a venous catheter 1 (One) Time. - Intravenous     sodium chloride 0.9 % infusion 75 mL/hr Continuous 4/13/2017       Sig - Route: Infuse 75 mL/hr into a venous catheter Continuous. - Intravenous     temazepam (RESTORIL) capsule 30 mg 30 mg Nightly PRN 4/13/2017       Sig - Route: Take 2 capsules by mouth At Night As Needed for Sleep. - Oral     tiZANidine (ZANAFLEX) tablet 4 mg 4 mg Every 6 Hours Scheduled 4/13/2017       Sig - Route: Take 1 tablet by mouth Every 6 (Six) Hours. - Oral     vancomycin (VANCOCIN) 1,000 mg in sodium chloride 0.9 % 250 mL IVPB 1,000 mg Once 4/12/2017 4/12/2017     Sig - Route: Infuse 1,000 mg into a venous catheter 1 (One) Time. - Intravenous     vancomycin (VANCOCIN) 1,000 mg in sodium chloride 0.9 % 250 mL IVPB 1,000 mg Every 24 Hours 4/13/2017       Sig - Route: Infuse 1,000 mg into a venous catheter Daily. - Intravenous     Pharmacy to dose vancomycin (Discontinued)   Continuous PRN 4/13/2017 4/13/2017     Sig - Route: Continuous As Needed for Consult. - Does not apply     Reason for Discontinue: Dose adjustment             PROBLEM LIST:         Patient Active Problem List   Diagnosis   • Cellulitis of foot, left          ASSESSMENT:     1. Gas Gangrene with Abcess, Cellulitis, Edema, Rule out septic  Joint/Osteomyeltis Left foot  2. Diabetes with Neuropathy, Left.      PLAN:     Unfortunately the patient carries a poor limb prognosis in the setting of possible gas gangrene with abcess rule out om/septic joint. Although the patient has eaten breakfast and lunch today, will keep npo thereafter for immediate I and D with Deep Culture. Check CRP and sed rate with blood cultures. Advise Diagnostic imaging to evaluate the degree of Osteomyelitis involved. ID Following has advised pseudomonal and anaerobic coverage along with vancomycin until cultures back. Will Follow        Patients findings and recommendations were discussed with patient and nursing staff     Code Status: Full Code    Basilio Morris  04/14/2017  11:30 a.m.          Electronically signed by Basilio Morris MD at 4/14/2017  4:00 PM

## 2017-04-19 NOTE — PROGRESS NOTES
"  I have personally seen and examined the patient today and discussed overnight interval progress and pertinent issues with nursing staff.    Subjective    She continues to do well on IV cefazolin with no complaints and no issues reported by her nurse.  Normal white count.    Review of Systems    Constitutional: no fever, chills and night sweats. No appetite change or unexpected weight change. No fatigue.  Eyes: no eye drainage, itching or redness.  HEENT: no mouth sores, dysphagia or nose bleed.  Respiratory: no for shortness of breath, cough or production of sputum.  Cardiovascular: no chest pain, no palpitations, no orthopnea.  Gastrointestinal: no nausea, vomiting or diarrhea. No abdominal pain, hematemesis or rectal bleeding.  Genitourinary: no dysuria or polyuria.  Hematologic/lymphatic: no lymph node abnormalities, no easy bruising or easy bleeding.  Musculoskeletal: Positive for foot pain  Skin: Status post I&D of the left foot  Neurological: no loss of consciousness, no seizure, no headache.  Behavioral/Psych: no depression or suicidal ideation.  Endocrine: no hot flashes.  Immunologic: negative.      History taken from: patient chart RN      Vital Signs    /82 (BP Location: Left arm, Patient Position: Lying)  Pulse 67  Temp 98.5 °F (36.9 °C) (Oral)   Resp 18  Ht 66\" (167.6 cm)  Wt 157 lb 9 oz (71.5 kg)  SpO2 97%  BMI 25.43 kg/m2    Temp:  [97.7 °F (36.5 °C)-98.9 °F (37.2 °C)] 98.5 °F (36.9 °C)      Intake/Output Summary (Last 24 hours) at 04/19/17 1102  Last data filed at 04/19/17 0300   Gross per 24 hour   Intake              960 ml   Output                0 ml   Net              960 ml     Intake & Output (last 3 days)       04/16 0701 - 04/17 0700 04/17 0701 - 04/18 0700 04/18 0701 - 04/19 0700 04/19 0701 - 04/20 0700    P.O. 720 1560 960     I.V. (mL/kg)        Total Intake(mL/kg) 720 (10.1) 1560 (21.8) 960 (13.4)     Net +720 +1560 +960              Unmeasured Urine Occurrence 9 x 5 x 8 x "     Unmeasured Stool Occurrence 3 x 1 x 1 x           Physical exam:    General Appearance:    Slightly confused, Alert, cooperative, in no acute distress, nurse at bedside   Head:    Normocephalic, without obvious abnormality, atraumatic   Eyes:            Lids and lashes normal, conjunctivae and sclerae normal, no   icterus, no pallor, corneas clear, PERRLA   Ears:    Ears appear intact with no abnormalities noted   Throat:   No oral lesions, no thrush, oral mucosa moist   Neck:   No adenopathy, supple, trachea midline, no thyromegaly, no   carotid bruit, no JVD   Back:     No tenderness to percussion or palpation, range of motion   normal   Lungs:     Clear to auscultation,respirations regular, even and unlabored. No wheezing, no ronchi and no crackles.    Heart:    Regular rhythm and normal rate, normal S1 and S2, no            murmur, no gallop, no rub, no click   Chest Wall:    No abnormalities observed   Abdomen:     Normal bowel sounds, no masses, no organomegaly, soft        non-tender, non-distended, no guarding, no rebound                tenderness   Rectal:     Deferred   Extremities:   Left foot I&D on Saturday by Dr. Morris that looks significantly better.  The wound is clean without drainage.   Pulses:   Pulses palpable and equal bilaterally   Skin:  Left foot I&D on Saturday by Dr. Morris that looks significantly better.  The wound is clean without drainage.     Lymph nodes:   No palpable adenopathy   Neurologic:   Awake, alert, slightly confused           Results:      Results from last 7 days  Lab Units 04/18/17  0029 04/16/17  0032 04/12/17  2047   WBC 10*3/mm3 5.84 5.79 8.22     Lab Results   Component Value Date    NEUTROABS 4.14 04/18/2017         Results from last 7 days  Lab Units 04/18/17  0029   CREATININE mg/dL 0.94     Results Review:    I have personally reviewed laboratory data, culture results, radiology studies and antimicrobial therapy.    Hospital Medications (active)       Dose  Frequency Start End    acetaminophen (TYLENOL) tablet 487.5 mg 487.5 mg Every 4 Hours PRN 4/13/2017     Sig - Route: Take 1.5 tablets by mouth Every 4 (Four) Hours As Needed for Fever. - Oral    ALPRAZolam (XANAX) tablet 0.5 mg 0.5 mg Once 4/14/2017 4/14/2017    Sig - Route: Take 1 tablet by mouth 1 (One) Time. - Oral    atenolol (TENORMIN) tablet 25 mg 25 mg Daily 4/13/2017     Sig - Route: Take 1 tablet by mouth Daily. - Oral    cefepime (MAXIPIME) 2 g/100 mL 0.9% NS (mbp) 2 g Every 12 Hours 4/14/2017     Sig - Route: Infuse 100 mL into a venous catheter Every 12 (Twelve) Hours. - Intravenous    cetirizine (zyrTEC) tablet 10 mg 10 mg Daily 4/13/2017     Sig - Route: Take 1 tablet by mouth Daily. - Oral    dextrose (D50W) solution 25 g 25 g Every 15 Minutes PRN 4/13/2017     Sig - Route: Infuse 50 mL into a venous catheter Every 15 (Fifteen) Minutes As Needed for Low Blood Sugar (Blood Sugar Less Than 70, Patient Has IV Access - Unresponsive, NPO or Unable To Safely Swallow). - Intravenous    dextrose (GLUTOSE) oral gel 15 g 15 g Every 15 Minutes PRN 4/13/2017     Sig - Route: Take 15 g by mouth Every 15 (Fifteen) Minutes As Needed for Low Blood Sugar (Blood Sugar Less Than 70, Patient Alert, Is Not NPO & Can Safely Swallow). - Oral    FLUoxetine (PROzac) capsule 20 mg 20 mg Daily 4/13/2017     Sig - Route: Take 1 capsule by mouth Daily. - Oral    gabapentin (NEURONTIN) capsule 800 mg 800 mg Every 8 Hours Scheduled 4/13/2017     Sig - Route: Take 2 capsules by mouth Every 8 (Eight) Hours. - Oral    glucagon (human recombinant) (GLUCAGEN DIAGNOSTIC) injection 1 mg 1 mg Every 15 Minutes PRN 4/13/2017     Sig - Route: Inject 1 mg under the skin Every 15 (Fifteen) Minutes As Needed (Blood Glucose Less Than 70 - Patient Without IV Access - Unresponsive, NPO or Unable To Safely Swallow). - Subcutaneous    HYDROcodone-acetaminophen (NORCO)  MG per tablet 1 tablet 1 tablet Every 8 Hours Scheduled 4/13/2017     Sig -  Route: Take 1 tablet by mouth Every 8 (Eight) Hours. - Oral    hydrOXYzine (ATARAX) tablet 10 mg 10 mg Daily 4/13/2017     Sig - Route: Take 1 tablet by mouth Daily. - Oral    insulin aspart (novoLOG) injection 0-9 Units 0-9 Units 4 Times Daily Before Meals & Nightly 4/13/2017     Sig - Route: Inject 0-9 Units under the skin 4 (Four) Times a Day Before Meals & at Bedtime. - Subcutaneous    insulin detemir (LEVEMIR) injection 22 Units 22 Units Nightly 4/13/2017     Sig - Route: Inject 22 Units under the skin Every Night. - Subcutaneous    lidocaine PF 1% (XYLOCAINE) injection 10 mL 10 mL Once 4/12/2017     Sig - Route: Inject 10 mL as directed 1 (One) Time. - Injection    Cosign for Ordering: Accepted by Carlos Piper MD on 4/12/2017 10:11 PM    metroNIDAZOLE (FLAGYL) IVPB 500 mg 500 mg Every 8 Hours 4/13/2017     Sig - Route: Infuse 100 mL into a venous catheter Every 8 (Eight) Hours. - Intravenous    Cosign for Ordering: Accepted by Ricci Rubio MD on 4/13/2017 12:50 PM    morphine injection 2 mg 2 mg Every 2 Hours PRN 4/13/2017 4/23/2017    Sig - Route: Infuse 1 mL into a venous catheter Every 2 (Two) Hours As Needed for Severe Pain (7-10). - Intravenous    Pharmacy Meds to Bed Consult  Daily 4/13/2017     Sig - Route: Daily. - Does not apply    sodium chloride 0.9 % infusion 75 mL/hr Continuous 4/13/2017     Sig - Route: Infuse 75 mL/hr into a venous catheter Continuous. - Intravenous    temazepam (RESTORIL) capsule 30 mg 30 mg Nightly PRN 4/13/2017     Sig - Route: Take 2 capsules by mouth At Night As Needed for Sleep. - Oral    tiZANidine (ZANAFLEX) tablet 4 mg 4 mg Every 6 Hours Scheduled 4/13/2017     Sig - Route: Take 1 tablet by mouth Every 6 (Six) Hours. - Oral    vancomycin (VANCOCIN) 1,000 mg in sodium chloride 0.9 % 250 mL IVPB 1,000 mg Every 24 Hours 4/13/2017     Sig - Route: Infuse 1,000 mg into a venous catheter Daily. - Intravenous    cefepime (MAXIPIME) 2 g/100 mL 0.9% NS (mbp)  (Discontinued) 2 g Every 8 Hours 4/13/2017 4/14/2017    Sig - Route: Infuse 100 mL into a venous catheter Every 8 (Eight) Hours. - Intravenous            Cultures:    Blood Culture   Date Value Ref Range Status   04/12/2017 No growth at 24 hours  Preliminary   04/12/2017 No growth at 24 hours  Preliminary     Wound Culture   Date Value Ref Range Status   04/12/2017 No growth at 24 hours  Preliminary       PROBLEM LIST:    Patient Active Problem List   Diagnosis   • Cellulitis of foot, left       Assessment/Plan     ASSESSMENT:    1. Abscess of the foot     PLAN:    The patient is tolerating high-dose IV cefazolin 2 g IV every 8 hours and if the patient is unable to do every 8 hours dosing then high-dose Rocephin or daptomycin would be adequate for the patient upon discharge through 5/15/2017.    Left foot I&D on Saturday by Dr. Morris that looks significantly better.  The wound is clean without drainage.   Left toe culture from 4/15/2017 finalized MSSA which is the same as the culture from 4/12/2017.        Even though the MRI shows no osteomyelitis the patient's clinical presentation with plantar and dorsal cellulitis and bruising as well as CRP level of 30 are all indicative of acute osteomyelitis.  For now would treat as such.  IV antibiotic therapy will be recommended to continue through 5/15/2017 as the patient carries a very high morbidity and guarded limb prognosis.    The patient is okay to discharge from infectious disease standpoint on IV antibiotic therapy.    In the setting of diarrhea and broad-spectrum antibiotic coverage C. difficile toxin PCR was ordered as well as Risaquad.         Patients findings and recommendations were discussed with patient and nursing staff    Code Status: Full Code     Written by Annalise Garcia PA-C, acting as scribe for Dr. Rubio.    Annalise Garcia PA-C  04/19/17  11:02 AM    Physician Attestation:    I have personally seen and examined the patient. I reviewed the  patient's data including history of present illness, review of systems, physical examination, assessment and treatment plan and agree with findings above. The assessment and plan are my own.  I have reviewed and edited the note above after discussing the findings with Annalise Garcia PA-C.    Ricci Rubio MD  Infectious Diseases  04/19/17  4:06 PM

## 2017-04-19 NOTE — PROGRESS NOTES
PODIATRIC SURGERY PROGRESS NOTE     LOS: 7 days     Subjective     Interval History:     Patient Complaints: None     Objective     Vital Signs  Temp:  [97.7 °F (36.5 °C)-98.5 °F (36.9 °C)] 98.2 °F (36.8 °C)  Heart Rate:  [65-77] 77  Resp:  [18-20] 18  BP: (126-178)/(55-83) 156/78    Labs & Rads    Imaging Results (last 72 hours)     Procedure Component Value Units Date/Time    MRI Foot Left Without Contrast [[id number]] Collected:  04/17/17 1130     Updated:  04/17/17 1328    Narrative:       MRI FOOT LEFT WITHOUT CONTRAST-     REASON FOR EXAM:  Abscess OM; L03.116-Cellulitis of left lower limb.     FINDINGS: Scans were obtained through the foot in the sagittal, axial  and coronal planes using T1 and T2-weighted sequences. The patient had  difficulty holding still for the exam despite repeating sequences. There  is motion artifact on the exam. The marrow signal in the bones of the  foot showed no areas of edema to suggest osteomyelitis. There is soft  tissue swelling around the foot with a defect along the plantar surface  at the level of the 1st metatarsophalangeal joint space.       Impression:       Limited exam due to motion artifact. There were no marrow  signal changes to suggest osteomyelitis. There is edema and a open wound  in the foot just beneath the 1st metatarsophalangeal joint.     This report was finalized on 4/17/2017 1:26 PM by Dr. Emanuel Aguiar II, MD.             Results from last 7 days  Lab Units 04/18/17  0029 04/16/17  0032 04/14/17  1429 04/12/17  2047   WBC 10*3/mm3 5.84 5.79  --  8.22   CRP mg/dL  --   --  30.05*  --      Lab Results   Component Value Date    NEUTROABS 4.14 04/18/2017       Results from last 7 days  Lab Units 04/18/17  0029   CREATININE mg/dL 0.94       Blood Culture   Date Value Ref Range Status   04/16/2017 No growth at 3 days  Preliminary            Culture   Date Value Ref Range Status   04/15/2017 Light growth (2+) Staphylococcus aureus (A)  Final     Wound Culture    Date Value Ref Range Status   04/12/2017 Scant growth (1+) Staphylococcus aureus (A)  Final       Results Review:    I have personally reviewed laboratory data, culture results, radiology studies and antimicrobial therapy.    Physical Exam:   Physical Exam: Continued Mild Erythema and Edema, Plantar and medial MTPJ wound no drainage.      Results Review:     I reviewed the patient's new clinical results.      Assessment:  Active Problems:    Cellulitis of foot, left    Acute Osteomyelitis, Left First MTPJ    DM with PN    Elevated CRP    History partial foot amputation.       Plan: Continue IV antibiotics, agree with plan for treating as acute OM with depth of infection and elevated CRP level. Continue Aseptic packing for secondary granulation, no indication for negative pressure therapy. Will Follow.         Basilio Morris MD  04/19/17  6:21 PM

## 2017-04-19 NOTE — PROGRESS NOTES
Discharge Planning Assessment   [facility]     Patient Name: Reny Elena  MRN: [id number]  Today's Date: 4/19/2017    Admit Date: 4/12/2017          Discharge Plan       04/19/17 1125    Case Management/Social Work Plan    Plan SS spoke to pt who states plans to return home at discharge. Pt states having a friend that is a RN whom assist as needed, however she recently had surgery. Pt states sign. other will assist with her care after discharge while friend is recovering from surgery. Pt request [facility]. Pt states being informed by MD that she will need long-term IV antibiotics. [facility] and IV antibiotics to be arranged with MD order. MD has discussed PICC line with pt. Pt has good family support. Pt's dtr and son lives nearby. SS to follow and assist as needed.     13:40 SS received consult home health/home iv abx. Pt request [facility]. [phone] Leanna [facility]. SS faxed information to [facility] [phone]. [facility] to be contacted at discharge. SS contacted [facility] [place] [facility] [phone] per Dilia who will verify IV antibiotic copay. SS faxed information including note by Dr. Rubio to [facility] [place] [phone]. SS to follow.     14:27 SS received call from [facility] per Anu who states pt has no copay for IV Rocephin 2 grams every 24 hours. SS received call from [facility] per Mariola who request prescription for Dakins and wound care due to [employer] requirements. SS to follow.           Expected Discharge Date and Time     Expected Discharge Date Expected Discharge Time    Apr 20, 2017         Jihan Dugan

## 2017-04-19 NOTE — DISCHARGE PLACEMENT REQUEST
"Reny Merino (57 y.o. Female)     Date of Birth Social Security Number Address Home Phone MRN    1960  [address] [place] [address] [phone] [id number]    Sikh Marital Status          None Single       Admission Date Admission Type Admitting Provider Attending Provider Department, Room/Bed    4/12/17 Emergency Yandel Paulson MD Watts, John Michael, MD [address], [address]/    Discharge Date Discharge Disposition Discharge Destination                      Attending Provider: Yandel Paulson MD     Allergies:  Codeine, Latex, Penicillins, Sulfa Antibiotics    Isolation:  None   Infection:  None   Code Status:  FULL    Ht:  66\" (167.6 cm)   Wt:  157 lb 9 oz (71.5 kg)    Admission Cmt:  None   Principal Problem:  None                Active Insurance as of 4/12/2017     Primary Coverage     Payor Plan Insurance Group Employer/Plan Group    [employer] [place] [employer] [place]      Payor Plan Address Payor Plan Phone Number Effective From Effective To    [address] [address] [phone] 4/12/2017     [place] [address]       Subscriber Name Subscriber Birth Date Member ID       RENY MERINO [id number]                 Emergency Contacts      (Rel.) Home Phone Work Phone Mobile Phone    Liza Oliva (Daughter) [phone] -- --               History & Physical      H&P signed by Yandel Paulson MD at 4/16/2017  7:09 PM            ADMITTING DIAGNOSIS:  Left foot cellulitis and abscess.     HISTORY OF PRESENT ILLNESS:  The patient is a 57-year-old well-known insulin-dependent diabetic with hypertension, who presented to the emergency room with swelling in the forefoot of her left foot after having several days of swelling in her dorsal foot with some tenderness and fullness. She states that she punctured her foot a number of weeks ago outside. It was incised and drained and cultured in the emergency room and she was started on IV antibiotics. "     PAST MEDICAL HISTORY:  Multiple foot infections and surgical drainage and debridement along with osteo and gangrene of toe.     SOCIAL HISTORY:   She does not smoke or drink.     ALLERGIES:   1.  PENICILLIN.  2.  CODEINE.   3.  SULFA.     PHYSICAL EXAMINATION:  GENERAL:  She is alert, oriented, and in no acute distress.   NECK: Supple.   LUNGS: Clear.   HEART: S1 and S2.   ABDOMEN: Soft.  Left foot is currently dressed following incision and drainage with tenderness to palpation and swelling over her dorsal ball of her foot.     IMPRESSION: Abscess and cellulitis of the foot.     PLAN:  Await cultures, IV antibiotics, and may need surgical intervention.         D: PARAMJIT 04/14/2017 15:04:00 ET  T: nahomy / 04/14/2017 15:26:59 ET  Revision Count: 0  Job ID: [id number]  [id number]  cc:        Dictator Signature:___________________________     Yandel Paulson MD     Electronically signed by Yandel Paulson MD at 4/16/2017  7:09 PM        Hospital Medications (active)       Dose Frequency Start End    acetaminophen (TYLENOL) tablet 487.5 mg 487.5 mg Every 4 Hours PRN 4/13/2017     Sig - Route: Take 1.5 tablets by mouth Every 4 (Four) Hours As Needed for Fever. - Oral    atenolol (TENORMIN) tablet 25 mg 25 mg Daily 4/13/2017     Sig - Route: Take 1 tablet by mouth Daily. - Oral    ceFAZolin (ANCEF) IVPB (duplex) 2 g 2 g Every 8 Hours 4/17/2017     Sig - Route: Infuse 2,000 mg into a venous catheter Every 8 (Eight) Hours. - Intravenous    Cosign for Ordering: Required by Ricci Rubio MD    cetirizine (zyrTEC) tablet 10 mg 10 mg Daily 4/13/2017     Sig - Route: Take 1 tablet by mouth Daily. - Oral    dextrose (D50W) solution 25 g 25 g Every 15 Minutes PRN 4/13/2017     Sig - Route: Infuse 50 mL into a venous catheter Every 15 (Fifteen) Minutes As Needed for Low Blood Sugar (Blood Sugar Less Than 70, Patient Has IV Access - Unresponsive, NPO or Unable To Safely Swallow). - Intravenous    dextrose (GLUTOSE) oral  gel 15 g 15 g Every 15 Minutes PRN 4/13/2017     Sig - Route: Take 15 g by mouth Every 15 (Fifteen) Minutes As Needed for Low Blood Sugar (Blood Sugar Less Than 70, Patient Alert, Is Not NPO & Can Safely Swallow). - Oral    enoxaparin (LOVENOX) syringe 40 mg 40 mg Daily 4/18/2017     Sig - Route: Inject 0.4 mL under the skin Daily. - Subcutaneous    FLUoxetine (PROzac) capsule 20 mg 20 mg Daily 4/13/2017     Sig - Route: Take 1 capsule by mouth Daily. - Oral    gabapentin (NEURONTIN) capsule 800 mg 800 mg Every 8 Hours Scheduled 4/13/2017     Sig - Route: Take 2 capsules by mouth Every 8 (Eight) Hours. - Oral    glucagon (human recombinant) (GLUCAGEN DIAGNOSTIC) injection 1 mg 1 mg Every 15 Minutes PRN 4/13/2017     Sig - Route: Inject 1 mg under the skin Every 15 (Fifteen) Minutes As Needed (Blood Glucose Less Than 70 - Patient Without IV Access - Unresponsive, NPO or Unable To Safely Swallow). - Subcutaneous    HYDROcodone-acetaminophen (NORCO)  MG per tablet 1 tablet 1 tablet Every 8 Hours Scheduled 4/13/2017     Sig - Route: Take 1 tablet by mouth Every 8 (Eight) Hours. - Oral    HYDROcodone-acetaminophen (NORCO) 7.5-325 MG per tablet 1 tablet 1 tablet Every 4 Hours PRN 4/18/2017 4/28/2017    Sig - Route: Take 1 tablet by mouth Every 4 (Four) Hours As Needed for Moderate Pain (4-6). - Oral    hydrOXYzine (ATARAX) tablet 10 mg 10 mg Daily 4/13/2017     Sig - Route: Take 1 tablet by mouth Daily. - Oral    ibuprofen (ADVIL,MOTRIN) tablet 800 mg 800 mg Every 6 Hours PRN 4/18/2017     Sig - Route: Take 1 tablet by mouth Every 6 (Six) Hours As Needed for Mild Pain (1-3). - Oral    insulin aspart (novoLOG) injection 0-9 Units 0-9 Units 4 Times Daily Before Meals & Nightly 4/13/2017     Sig - Route: Inject 0-9 Units under the skin 4 (Four) Times a Day Before Meals & at Bedtime. - Subcutaneous    insulin detemir (LEVEMIR) injection 25 Units 25 Units Nightly 4/16/2017     Sig - Route: Inject 25 Units under the skin  "Every Night. - Subcutaneous    labetalol (NORMODYNE,TRANDATE) injection 20 mg 20 mg Every 6 Hours PRN 4/16/2017     Sig - Route: Infuse 4 mL into a venous catheter Every 6 (Six) Hours As Needed for High Blood Pressure. - Intravenous    lidocaine PF 1% (XYLOCAINE) injection 10 mL 10 mL Once 4/12/2017     Sig - Route: Inject 10 mL as directed 1 (One) Time. - Injection    Cosign for Ordering: Accepted by Carlos Piper MD on 4/12/2017 10:11 PM    morphine injection 1 mg 1 mg Every 4 Hours PRN 4/18/2017 4/28/2017    Sig - Route: Infuse 0.5 mL into a venous catheter Every 4 (Four) Hours As Needed for Moderate Pain (4-6). - Intravenous    Linked Group 1:  \"And\" Linked Group Details        morphine injection 2 mg 2 mg Every 2 Hours PRN 4/13/2017 4/23/2017    Sig - Route: Infuse 1 mL into a venous catheter Every 2 (Two) Hours As Needed for Severe Pain (7-10). - Intravenous    naloxone (NARCAN) injection 0.4 mg 0.4 mg Every 5 Minutes PRN 4/18/2017     Sig - Route: Infuse 1 mL into a venous catheter Every 5 (Five) Minutes As Needed for Respiratory Depression. - Intravenous    Linked Group 1:  \"And\" Linked Group Details        naloxone (NARCAN) injection 0.4 mg 0.4 mg Every 5 Minutes PRN 4/15/2017     Sig - Route: Infuse 1 mL into a venous catheter Every 5 (Five) Minutes As Needed for Respiratory Depression. - Intravenous    Linked Group 2:  \"And\" Linked Group Details        ondansetron (ZOFRAN) injection 4 mg 4 mg Every 6 Hours PRN 4/16/2017     Sig - Route: Infuse 2 mL into a venous catheter Every 6 (Six) Hours As Needed for Nausea or Vomiting. - Intravenous    ondansetron (ZOFRAN) tablet 4 mg 4 mg Every 6 Hours PRN 4/16/2017     Sig - Route: Take 1 tablet by mouth Every 6 (Six) Hours As Needed for Nausea or Vomiting. - Oral    Pharmacy Meds to Bed Consult  Daily 4/13/2017     Sig - Route: Daily. - Does not apply    promethazine (PHENERGAN) 12.5 mg in sodium chloride 0.9 % 50 mL 12.5 mg Every 4 Hours PRN 4/18/2017     Sig - " Route: Infuse 12.5 mg into a venous catheter Every 4 (Four) Hours As Needed for Nausea. - Intravenous    promethazine (PHENERGAN) 12.5 mg in sodium chloride 0.9 % 50 mL 12.5 mg Every 4 Hours PRN 4/15/2017     Sig - Route: Infuse 12.5 mg into a venous catheter Every 4 (Four) Hours As Needed for Nausea. - Intravenous    Risaquad-2 capsule 1 capsule 1 capsule Daily 4/18/2017     Sig - Route: Take 1 capsule by mouth Daily. - Oral    sodium chloride 0.9 % infusion 75 mL/hr Continuous 4/13/2017     Sig - Route: Infuse 75 mL/hr into a venous catheter Continuous. - Intravenous    sodium chloride 0.9 % infusion 100 mL/hr Continuous 4/18/2017     Sig - Route: Infuse 100 mL/hr into a venous catheter Continuous. - Intravenous    sodium chloride 0.9 % infusion 100 mL/hr Continuous 4/15/2017     Sig - Route: Infuse 100 mL/hr into a venous catheter Continuous. - Intravenous    sodium hypochlorite (DAKIN'S) topical solution 0.25% (half strength)  Every 12 Hours 4/16/2017     Sig - Route: Irrigate with  as directed Every 12 (Twelve) Hours. - Irrigation    temazepam (RESTORIL) capsule 30 mg 30 mg Nightly PRN 4/13/2017     Sig - Route: Take 2 capsules by mouth At Night As Needed for Sleep. - Oral    tiZANidine (ZANAFLEX) tablet 4 mg 4 mg Every 6 Hours Scheduled 4/13/2017     Sig - Route: Take 1 tablet by mouth Every 6 (Six) Hours. - Oral            Lab Results (last 24 hours)     Procedure Component Value Units Date/Time    Blood Culture [[id number]]  (Normal) Collected:  04/16/17 1407    Specimen:  Blood from Arm, Right Updated:  04/18/17 1501     Blood Culture No growth at 2 days    Blood Culture [[id number]]  (Normal) Collected:  04/14/17 1429    Specimen:  Blood from Arm, Right Updated:  04/18/17 1501     Blood Culture No growth at 4 days    Blood Culture [[id number]]  (Normal) Collected:  04/14/17 1519    Specimen:  Blood from Arm, Right Updated:  04/18/17 1601     Blood Culture No growth at 4 days    POC Glucose Fingerstick [[id number]]   (Abnormal) Collected:  04/18/17 1648    Specimen:  Blood Updated:  04/18/17 1652     Glucose 233 (H) mg/dL     Narrative:       Meter: [id number] : [id number] chucho jones    Blood Culture [[id number]]  (Normal) Collected:  04/16/17 1522    Specimen:  Blood from Arm, Right Updated:  04/18/17 1701     Blood Culture No growth at 2 days    POC Glucose Fingerstick [[id number]]  (Abnormal) Collected:  04/18/17 2046    Specimen:  Blood Updated:  04/18/17 2110     Glucose 204 (H) mg/dL     Narrative:       Meter: [id number] : [id number] harvey zander    POC Glucose Fingerstick [[id number]]  (Abnormal) Collected:  04/19/17 0753    Specimen:  Blood Updated:  04/19/17 0758     Glucose 193 (H) mg/dL     Narrative:       Meter: [id number] : [id number] chucho jones    POC Glucose Fingerstick [[id number]]  (Abnormal) Collected:  04/19/17 1123    Specimen:  Blood Updated:  04/19/17 1126     Glucose 191 (H) mg/dL     Narrative:       Meter: [id number] : [id number] chucho jones           Operative/Procedure Notes (most recent note)      Basilio Morris MD at 4/15/2017  9:00 AM  Version 1 of 1             INCISION AND DRAINAGE LOWER EXTREMITY  Procedure Note    Reny Elena  4/12/2017 - 4/15/2017      Pre-op Diagnosis:   Cellulitis of foot, left [L03.116]    Post-op Diagnosis:     Post-Op Diagnosis Codes:     * Cellulitis of foot, left [L03.116]    Procedure(s):  1. Incision and Drainage Abcess Subcutaneous, Left Foot  2. Excisional Debridement Necrotic Tissue Plantar and Medial First MTPJ Wounds, Left foot     Surgeon(s):  Basilio Morris MD    Anesthesia: Choice    Staff:   Circulator: Brii Andino RN  Scrub Person: Lizzy Pugh  Assistant: Curtis Szymanski    HEMOSTASIS:PCT, 250mmhg    Estimated Blood Loss: * No values recorded between 4/15/2017  8:03 AM and 4/15/2017  8:50 AM *    Specimens:                  Order Name Source Comment Collection Info Order Time   CULTURE, ROUTINE Toe, Left  Collected  By: Basilio Morris MD 4/15/2017  8:43 AM   CULTURE, ROUTINE Toe, Left  Collected By: Basilio Morris MD 4/15/2017  8:43 AM   TISSUE EXAM Toe, Left  Collected By: Basilio Morris MD 4/15/2017  8:43 AM       Grafts/Implants:none     Injectables:.5% Marcaine Plain      Drains:           Findings: Abcess first Webspace, abcess medial and plantar central subq first MTPJ, Left foot     Complications: None   Procedure(s):    1. Incision and Drainage Abcess Subcutaneous, Left Foot.   Under IV sedation the patient was brought to the OR placed in operative table in the supine position.  The extremity was scrubbed prepped and draped in usual sterile manner.  The limb was elevated disseminated pneumatic tourniquet for approximately 250 mmHg pressure at the calf level.  Attention was then directed to the left lower extremity where the incision was made on the plantar wound where a Rika hemostat was used for dissection of the subcutaneous tissue abscess where abscess of the first webspace plantar central and medial first met jet a were decompressed and evacuated of infectious serosanguineous fluid.  This area was fully irrigated with 2 L vancomycin infiltrated saline pulse lavage irrigation with light curettage.  The 3 mm plantar first MTP joint wound seen to sinus tract through the subcutaneous tissue to the medial first MPJ exit wound/abscess.  After thorough irrigation of all abscess voids and sinus tracts this was packed with quarter-inch string gauze soaked in quarter strength Dakin solution.    2. Excisional Debridement Necrotic Tissue Plantar and Medial First MTPJ Wounds, Left foot .  The nonviable sloughing necrotic tissue surrounding the entire first MTP joint was sharply excisionally debrided with iris scissors pickups, the sloughing necrotic tissue attached was curettaged to normal tissue layer.  The necrotic tissue within the plantar and medial first MTP joint wounds was sharply excised with iris scissors pickups  donna Jaquez.  This tissue sent for C&S and Gram stain.  Povidone Dressing was applied to the macerated skin first MPJ area bulky dressing was applied.  A 10 cc point percent Marcaine plain lopez block was performed proximal to the infectious area postoperatively. Tourniquet released vascular response nourished extremity patient transferred or PACU vessel stable neurovascular status intact extremity irritation    Basilio Morris MD     Date: 4/15/2017  Time: 9:01 AM     Electronically signed by Basilio Morris MD at 4/15/2017  9:11 AM        [address] [address]  Phone:  [phone]  Fax:          Patient:     Reny Elena MRN:  [id number]   [address] [place] [address] :  1960  SSN:    Phone: [phone] Sex:  F      INSURANCE PAYOR PLAN GROUP # SUBSCRIBER ID   Primary: [facility] [id number]   [id number]   Admitting Diagnosis: Cellulitis of foot, left [L03.116]  Order Date:  2017               Inpatient Consult to Case Management        (Order ID: [id number])     Diagnosis:         Priority:  Routine Expected Date:   Expiration Date:        Interval:   Count:    Reason for Consult? home health/home iv abx     Specimen Type:   Specimen Source:   Specimen Taken Date:   Specimen Taken Time:                         Authorizing Provider:Yandel Paulson MD  Order Entered By: Yandel Paulson MD 2017 12:23 PM     Electronically signed by: Yandel Paulson MD (NPI: [id number]) 2017 12:23 PM

## 2017-04-19 NOTE — PLAN OF CARE
Problem: Patient Care Overview (Adult)  Goal: Plan of Care Review  Outcome: Ongoing (interventions implemented as appropriate)    04/19/17 1422   Coping/Psychosocial Response Interventions   Plan Of Care Reviewed With patient   Patient Care Overview   Progress improving       Goal: Adult Individualization and Mutuality  Outcome: Ongoing (interventions implemented as appropriate)  Goal: Discharge Needs Assessment  Outcome: Ongoing (interventions implemented as appropriate)    Problem: Cellulitis (Adult)  Goal: Signs and Symptoms of Listed Potential Problems Will be Absent or Manageable (Cellulitis)  Outcome: Ongoing (interventions implemented as appropriate)    04/19/17 1422   Cellulitis   Problems Assessed (Cellulitis) pain;infection   Problems Present (Cellulitis) pain;situational response         Problem: Pressure Ulcer Risk (Helio Scale) (Adult,Obstetrics,Pediatric)  Goal: Identify Related Risk Factors and Signs and Symptoms  Outcome: Ongoing (interventions implemented as appropriate)  Goal: Skin Integrity  Outcome: Ongoing (interventions implemented as appropriate)    04/19/17 1422   Pressure Ulcer Risk (Helio Scale) (Adult,Obstetrics,Pediatric)   Skin Integrity making progress toward outcome         Problem: Pain, Acute (Adult)  Goal: Identify Related Risk Factors and Signs and Symptoms  Outcome: Ongoing (interventions implemented as appropriate)  Goal: Acceptable Pain Control/Comfort Level  Outcome: Ongoing (interventions implemented as appropriate)    04/19/17 1422   Pain, Acute (Adult)   Acceptable Pain Control/Comfort Level making progress toward outcome

## 2017-04-19 NOTE — PLAN OF CARE
Problem: Cellulitis (Adult)  Goal: Signs and Symptoms of Listed Potential Problems Will be Absent or Manageable (Cellulitis)  Outcome: Ongoing (interventions implemented as appropriate)    Problem: Pressure Ulcer Risk (Helio Scale) (Adult,Obstetrics,Pediatric)  Goal: Identify Related Risk Factors and Signs and Symptoms  Outcome: Ongoing (interventions implemented as appropriate)  Goal: Skin Integrity  Outcome: Ongoing (interventions implemented as appropriate)    Problem: Pain, Acute (Adult)  Goal: Identify Related Risk Factors and Signs and Symptoms  Outcome: Ongoing (interventions implemented as appropriate)  Goal: Acceptable Pain Control/Comfort Level  Outcome: Ongoing (interventions implemented as appropriate)    Problem: Perioperative Period (Adult)  Goal: Signs and Symptoms of Listed Potential Problems Will be Absent or Manageable (Perioperative Period)  Outcome: Ongoing (interventions implemented as appropriate)

## 2017-04-19 NOTE — PROGRESS NOTES
Reny Elena 57 y.o.   04/19/17    Subjective: Patient doing well no complaints  Objective: Vital signs stable wound looks good with packing removed and redressed this morning  Vital Signs (last 72 hrs)       04/15 0700  -  04/16 0659 04/16 0700  -  04/17 0659 04/17 0700  -  04/18 0659 04/18 0700  -  04/19 0626   Most Recent    Temp (°F) 97.2 -  98.7    98.1 -  100.1    97.9 -  98.4    97.7 -  98.9     97.7 (36.5)    Heart Rate 72 -  90    79 -  97    68 -  80    70 -  72     72    Resp 8 -  20      18      20      20     20    /73 -  168/83    135/68 -  (!) 207/94    144/70 -  175/79    126/55 -  179/84     126/55    SpO2 (%) 92 -  100      91    96 -  98    92 -  100     93        Lab Results (last 72 hours)     Procedure Component Value Units Date/Time    POC Glucose Fingerstick [[id number]]  (Abnormal) Collected:  04/16/17 0719    Specimen:  Blood Updated:  04/16/17 0744     Glucose 199 (H) mg/dL     Narrative:       Meter: [id number] : [id number] [employer]    POC Glucose Fingerstick [[id number]]  (Abnormal) Collected:  04/16/17 1158    Specimen:  Blood Updated:  04/16/17 1207     Glucose 224 (H) mg/dL     Narrative:       Meter: [id number] : [id number] [employer]    POC Glucose Fingerstick [[id number]]  (Abnormal) Collected:  04/16/17 1706    Specimen:  Blood Updated:  04/16/17 1713     Glucose 220 (H) mg/dL     Narrative:       Meter: [id number] : [id number] [employer]    POC Glucose Fingerstick [[id number]]  (Abnormal) Collected:  04/16/17 2046    Specimen:  Blood Updated:  04/16/17 2049     Glucose 212 (H) mg/dL     Narrative:       Meter: [id number] : [id number] Venkata Majano    Culture, Routine [[id number]]  (Abnormal)  (Susceptibility) Collected:  04/15/17 0841    Specimen:  Swab from Toe, Left Updated:  04/17/17 0720     Culture Moderate growth (3+) Staphylococcus aureus (A)     Gram Stain Result Occasional Gram positive cocci in pairs    Susceptibility       Staphylococcus aureus     DAVID     Amoxicillin + Clavulanate <=4/2 ug/ml Susceptible     Ampicillin <=2 ug/ml Resistant     Ampicillin + Sulbactam <=8/4 ug/ml Susceptible     Ceftriaxone <=8 ug/ml Susceptible     Ciprofloxacin <=1 ug/ml Susceptible     Clindamycin <=0.5 ug/ml Susceptible     Erythromycin <=0.5 ug/ml Susceptible     Gentamicin <=4 ug/ml Susceptible     Levofloxacin <=1 ug/ml Susceptible  [1]      Moxifloxacin <=0.5 ug/ml Susceptible     Oxacillin <=0.25 ug/ml Susceptible     Penicillin G <=0.03 ug/ml Resistant     Rifampin <=1 ug/ml Susceptible     Tetracycline <=4 ug/ml Susceptible     Trimethoprim + Sulfamethoxazole <=0.5/9.5 ug/ml Susceptible     Vancomycin 1 ug/ml Susceptible            [1]   Staphylococcus species may develop resistance during prolonged therapy with quinolones.  Isolates that are initially susceptible may become resistant within three to four days after initiation of therapy. Testing of repeat isolates may be warranted.                 Wound Culture [[id number]]  (Abnormal)  (Susceptibility) Collected:  04/12/17 2047    Specimen:  Wound from Foot, Left Updated:  04/17/17 0731     Wound Culture Scant growth (1+) Staphylococcus aureus (A)     Gram Stain Result No WBCs or organisms seen    Susceptibility      Staphylococcus aureus     DAVID     Amoxicillin + Clavulanate <=4/2 ug/ml Susceptible     Ampicillin <=2 ug/ml Resistant     Ampicillin + Sulbactam <=8/4 ug/ml Susceptible     Ceftriaxone <=8 ug/ml Susceptible     Ciprofloxacin <=1 ug/ml Susceptible     Clindamycin <=0.5 ug/ml Susceptible     Erythromycin <=0.5 ug/ml Susceptible     Gentamicin <=4 ug/ml Susceptible     Levofloxacin <=1 ug/ml Susceptible  [1]      Moxifloxacin <=0.5 ug/ml Susceptible     Oxacillin <=0.25 ug/ml Susceptible     Penicillin G <=0.03 ug/ml Resistant     Rifampin <=1 ug/ml Susceptible     Tetracycline <=4 ug/ml Susceptible     Trimethoprim + Sulfamethoxazole <=0.5/9.5 ug/ml Susceptible     Vancomycin 1  ug/ml Susceptible            [1]   Staphylococcus species may develop resistance during prolonged therapy with quinolones.  Isolates that are initially susceptible may become resistant within three to four days after initiation of therapy. Testing of repeat isolates may be warranted.                 POC Glucose Fingerstick [[id number]]  (Abnormal) Collected:  04/17/17 0746    Specimen:  Blood Updated:  04/17/17 0750     Glucose 164 (H) mg/dL     Narrative:       Meter: [id number] : [id number] [employer]    POC Glucose Fingerstick [[id number]]  (Abnormal) Collected:  04/17/17 1133    Specimen:  Blood Updated:  04/17/17 1137     Glucose 184 (H) mg/dL     Narrative:       Meter: [id number] : [id number] [employer]    POC Glucose Fingerstick [[id number]]  (Abnormal) Collected:  04/17/17 1655    Specimen:  Blood Updated:  04/17/17 1700     Glucose 191 (H) mg/dL     Narrative:       Meter: [id number] : [id number] [employer]    POC Glucose Fingerstick [[id number]]  (Abnormal) Collected:  04/17/17 2135    Specimen:  Blood Updated:  04/17/17 2143     Glucose 215 (H) mg/dL     Narrative:       Meter: [id number] : [id number] harvey fermin    Blood Culture [[id number]]  (Normal) Collected:  04/12/17 2047    Specimen:  Blood from Arm, Right Updated:  04/17/17 2201     Blood Culture No growth at 5 days    Blood Culture [[id number]]  (Normal) Collected:  04/12/17 2127    Specimen:  Blood from Arm, Left Updated:  04/18/17 0002     Blood Culture No growth at 5 days    CBC & Differential [[id number]] Collected:  04/18/17 0029    Specimen:  Blood Updated:  04/18/17 0114    Narrative:       The following orders were created for panel order CBC & Differential.  Procedure                               Abnormality         Status                     ---------                               -----------         ------                     CBC Auto Differential[[id number]]         Abnormal            Final result                 Please  view results for these tests on the individual orders.    CBC Auto Differential [[id number]]  (Abnormal) Collected:  04/18/17 0029    Specimen:  Blood Updated:  04/18/17 0114     WBC 5.84 10*3/mm3      RBC 3.21 (L) 10*6/mm3      Hemoglobin 9.5 (L) g/dL      Hematocrit 31.1 (L) %      MCV 96.9 (H) fL      MCH 29.6 pg      MCHC 30.5 (L) g/dL      RDW 13.8 %      RDW-SD 49.3 fl      MPV 10.9 (H) fL      Platelets 187 10*3/mm3      Neutrophil % 71.0 (H) %      Lymphocyte % 15.2 (L) %      Monocyte % 9.4 %      Eosinophil % 2.7 %      Basophil % 0.5 %      Immature Grans % 1.2 (H) %      Neutrophils, Absolute 4.14 10*3/mm3      Lymphocytes, Absolute 0.89 (L) 10*3/mm3      Monocytes, Absolute 0.55 10*3/mm3      Eosinophils, Absolute 0.16 10*3/mm3      Basophils, Absolute 0.03 10*3/mm3      Immature Grans, Absolute 0.07 (H) 10*3/mm3     Basic Metabolic Panel [[id number]]  (Abnormal) Collected:  04/18/17 0029    Specimen:  Blood Updated:  04/18/17 0142     Glucose 195 (H) mg/dL      BUN 9 mg/dL      Creatinine 0.94 mg/dL      Sodium 140 mmol/L      Potassium 3.7 mmol/L      Chloride 108 mmol/L      CO2 23.3 (L) mmol/L      Calcium 8.3 mg/dL      eGFR Non African Amer 61 mL/min/1.73      BUN/Creatinine Ratio 9.6     Anion Gap 8.7 mmol/L     Narrative:       GFR Normal >60  Chronic Kidney Disease <60  Kidney Failure <15    Osmolality, Calculated [[id number]]  (Normal) Collected:  04/18/17 0029    Specimen:  Blood Updated:  04/18/17 0142     Osmolality Calc 283.4 mOsm/kg     Culture, Routine [[id number]]  (Abnormal)  (Susceptibility) Collected:  04/15/17 1118    Specimen:  Tissue from Toe, Left Updated:  04/18/17 0715     Culture Light growth (2+) Staphylococcus aureus (A)     Gram Stain Result Occasional Gram positive cocci in pairs    Susceptibility      Staphylococcus aureus     DAVID     Amoxicillin + Clavulanate <=4/2 ug/ml Susceptible     Ampicillin <=2 ug/ml Resistant     Ampicillin + Sulbactam <=8/4 ug/ml Susceptible      Ceftriaxone <=8 ug/ml Susceptible     Ciprofloxacin <=1 ug/ml Susceptible     Clindamycin <=0.5 ug/ml Susceptible     Erythromycin <=0.5 ug/ml Susceptible     Gentamicin <=4 ug/ml Susceptible     Levofloxacin <=1 ug/ml Susceptible  [1]      Moxifloxacin <=0.5 ug/ml Susceptible     Oxacillin <=0.25 ug/ml Susceptible     Penicillin G <=0.03 ug/ml Resistant     Rifampin <=1 ug/ml Susceptible     Tetracycline <=4 ug/ml Susceptible     Trimethoprim + Sulfamethoxazole <=0.5/9.5 ug/ml Susceptible     Vancomycin 1 ug/ml Susceptible            [1]   Staphylococcus species may develop resistance during prolonged therapy with quinolones.  Isolates that are initially susceptible may become resistant within three to four days after initiation of therapy. Testing of repeat isolates may be warranted.                 POC Glucose Fingerstick [[id number]]  (Abnormal) Collected:  04/18/17 0720    Specimen:  Blood Updated:  04/18/17 0724     Glucose 143 (H) mg/dL     Narrative:       Meter: [id number] : [employer]    POC Glucose Fingerstick [[id number]]  (Abnormal) Collected:  04/18/17 1201    Specimen:  Blood Updated:  04/18/17 1205     Glucose 145 (H) mg/dL     Narrative:       Meter: [id number] : [employer]    Blood Culture [[id number]]  (Normal) Collected:  04/16/17 1407    Specimen:  Blood from Arm, Right Updated:  04/18/17 1501     Blood Culture No growth at 2 days    Blood Culture [[id number]]  (Normal) Collected:  04/14/17 1429    Specimen:  Blood from Arm, Right Updated:  04/18/17 1501     Blood Culture No growth at 4 days    Blood Culture [[id number]]  (Normal) Collected:  04/14/17 1519    Specimen:  Blood from Arm, Right Updated:  04/18/17 1601     Blood Culture No growth at 4 days    POC Glucose Fingerstick [[id number]]  (Abnormal) Collected:  04/18/17 1648    Specimen:  Blood Updated:  04/18/17 1652     Glucose 233 (H) mg/dL     Narrative:       Meter: [id number] : [employer]    Blood  Culture [[id number]]  (Normal) Collected:  04/16/17 1522    Specimen:  Blood from Arm, Right Updated:  04/18/17 1701     Blood Culture No growth at 2 days    POC Glucose Fingerstick [[id number]]  (Abnormal) Collected:  04/18/17 2046    Specimen:  Blood Updated:  04/18/17 2110     Glucose 204 (H) mg/dL     Narrative:       Meter: [id number] : [id number] harvey fermin        Imaging Results (last 24 hours)     ** No results found for the last 24 hours. **        Assessment:Active Problems:    Cellulitis of foot, left   Abscess left foot  Plan: Home with IV antibiotics O1 okay with infectious disease

## 2017-04-19 NOTE — DISCHARGE PLACEMENT REQUEST
"Reny Merino (57 y.o. Female)     Date of Birth Social Security Number Address Home Phone MRN    1960  [address] [place] [address] [phone] [id number]    Muslim Marital Status          None Single       Admission Date Admission Type Admitting Provider Attending Provider Department, Room/Bed    4/12/17 Emergency Yandel Paulson MD Watts, John Michael, MD [address], 3328/    Discharge Date Discharge Disposition Discharge Destination                      Attending Provider: Yandel Paulson MD     Allergies:  Codeine, Latex, Penicillins, Sulfa Antibiotics    Isolation:  None   Infection:  None   Code Status:  FULL    Ht:  66\" (167.6 cm)   Wt:  157 lb 9 oz (71.5 kg)    Admission Cmt:  None   Principal Problem:  None                Active Insurance as of 4/12/2017     Primary Coverage     Payor Plan Insurance Group Employer/Plan Group    [employer] [place]      Payor Plan Address Payor Plan Phone Number Effective From Effective To    [address] [address] [phone] 4/12/2017     [place] [address]       Subscriber Name Subscriber Birth Date Member ID       RENY MERINO [id number]                 Emergency Contacts      (Rel.) Home Phone Work Phone Mobile Phone    Liza Oliva (Daughter) [phone] -- --               Physician Progress Notes (last 24 hours) (Notes from 4/18/2017  1:02 PM through 4/19/2017  1:02 PM)      Yandel Paulson MD at 4/19/2017  6:26 AM  Version 1 of 1         Reny Merino 57 y.o.   04/19/17    Subjective: Patient doing well no complaints  Objective: Vital signs stable wound looks good with packing removed and redressed this morning  Vital Signs (last 72 hrs)       04/15 0700  -  04/16 0659 04/16 0700  -  04/17 0659 04/17 0700  -  04/18 0659 04/18 0700  -  04/19 0626   Most Recent    Temp (°F) 97.2 -  98.7    98.1 -  100.1    97.9 -  98.4    97.7 -  98.9     97.7 (36.5)    Heart Rate 72 -  90    " 79 -  97    68 -  80    70 -  72     72    Resp 8 -  20      18      20      20     20    /73 -  168/83    135/68 -  (!) 207/94    144/70 -  175/79    126/55 -  179/84     126/55    SpO2 (%) 92 -  100      91    96 -  98    92 -  100     93        Lab Results (last 72 hours)     Procedure Component Value Units Date/Time    POC Glucose Fingerstick [[id number]]  (Abnormal) Collected:  04/16/17 0719    Specimen:  Blood Updated:  04/16/17 0744     Glucose 199 (H) mg/dL     Narrative:       Meter: [id number] : [id number] [employer]    POC Glucose Fingerstick [[id number]]  (Abnormal) Collected:  04/16/17 1158    Specimen:  Blood Updated:  04/16/17 1207     Glucose 224 (H) mg/dL     Narrative:       Meter: [id number] : [id number] [employer]    POC Glucose Fingerstick [[id number]]  (Abnormal) Collected:  04/16/17 1706    Specimen:  Blood Updated:  04/16/17 1713     Glucose 220 (H) mg/dL     Narrative:       Meter: [id number] : [id number] [employer]    POC Glucose Fingerstick [[id number]]  (Abnormal) Collected:  04/16/17 2046    Specimen:  Blood Updated:  04/16/17 2049     Glucose 212 (H) mg/dL     Narrative:       Meter: [id number] : [id number] [employer]    Culture, Routine [[id number]]  (Abnormal)  (Susceptibility) Collected:  04/15/17 0841    Specimen:  Swab from Toe, Left Updated:  04/17/17 0720     Culture Moderate growth (3+) Staphylococcus aureus (A)     Gram Stain Result Occasional Gram positive cocci in pairs    Susceptibility      Staphylococcus aureus     DAVID     Amoxicillin + Clavulanate <=4/2 ug/ml Susceptible     Ampicillin <=2 ug/ml Resistant     Ampicillin + Sulbactam <=8/4 ug/ml Susceptible     Ceftriaxone <=8 ug/ml Susceptible     Ciprofloxacin <=1 ug/ml Susceptible     Clindamycin <=0.5 ug/ml Susceptible     Erythromycin <=0.5 ug/ml Susceptible     Gentamicin <=4 ug/ml Susceptible     Levofloxacin <=1 ug/ml Susceptible  [1]      Moxifloxacin <=0.5 ug/ml Susceptible      Oxacillin <=0.25 ug/ml Susceptible     Penicillin G <=0.03 ug/ml Resistant     Rifampin <=1 ug/ml Susceptible     Tetracycline <=4 ug/ml Susceptible     Trimethoprim + Sulfamethoxazole <=0.5/9.5 ug/ml Susceptible     Vancomycin 1 ug/ml Susceptible            [1]   Staphylococcus species may develop resistance during prolonged therapy with quinolones.  Isolates that are initially susceptible may become resistant within three to four days after initiation of therapy. Testing of repeat isolates may be warranted.                 Wound Culture [[id number]]  (Abnormal)  (Susceptibility) Collected:  04/12/17 2047    Specimen:  Wound from Foot, Left Updated:  04/17/17 0731     Wound Culture Scant growth (1+) Staphylococcus aureus (A)     Gram Stain Result No WBCs or organisms seen    Susceptibility      Staphylococcus aureus     DAVID     Amoxicillin + Clavulanate <=4/2 ug/ml Susceptible     Ampicillin <=2 ug/ml Resistant     Ampicillin + Sulbactam <=8/4 ug/ml Susceptible     Ceftriaxone <=8 ug/ml Susceptible     Ciprofloxacin <=1 ug/ml Susceptible     Clindamycin <=0.5 ug/ml Susceptible     Erythromycin <=0.5 ug/ml Susceptible     Gentamicin <=4 ug/ml Susceptible     Levofloxacin <=1 ug/ml Susceptible  [1]      Moxifloxacin <=0.5 ug/ml Susceptible     Oxacillin <=0.25 ug/ml Susceptible     Penicillin G <=0.03 ug/ml Resistant     Rifampin <=1 ug/ml Susceptible     Tetracycline <=4 ug/ml Susceptible     Trimethoprim + Sulfamethoxazole <=0.5/9.5 ug/ml Susceptible     Vancomycin 1 ug/ml Susceptible            [1]   Staphylococcus species may develop resistance during prolonged therapy with quinolones.  Isolates that are initially susceptible may become resistant within three to four days after initiation of therapy. Testing of repeat isolates may be warranted.                 POC Glucose Fingerstick [[id number]]  (Abnormal) Collected:  04/17/17 0746    Specimen:  Blood Updated:  04/17/17 0750     Glucose 164 (H) mg/dL     Narrative:        Meter: [id number] : [id number] [employer]    POC Glucose Fingerstick [[id number]]  (Abnormal) Collected:  04/17/17 1133    Specimen:  Blood Updated:  04/17/17 1137     Glucose 184 (H) mg/dL     Narrative:       Meter: [id number] : [id number] [employer]    POC Glucose Fingerstick [[id number]]  (Abnormal) Collected:  04/17/17 1655    Specimen:  Blood Updated:  04/17/17 1700     Glucose 191 (H) mg/dL     Narrative:       Meter: [id number] : [id number] [employer]    POC Glucose Fingerstick [[id number]]  (Abnormal) Collected:  04/17/17 2135    Specimen:  Blood Updated:  04/17/17 2143     Glucose 215 (H) mg/dL     Narrative:       Meter: [id number] : [id number] harvey zander    Blood Culture [[id number]]  (Normal) Collected:  04/12/17 2047    Specimen:  Blood from Arm, Right Updated:  04/17/17 2201     Blood Culture No growth at 5 days    Blood Culture [[id number]]  (Normal) Collected:  04/12/17 2127    Specimen:  Blood from Arm, Left Updated:  04/18/17 0002     Blood Culture No growth at 5 days    CBC & Differential [[id number]] Collected:  04/18/17 0029    Specimen:  Blood Updated:  04/18/17 0114    Narrative:       The following orders were created for panel order CBC & Differential.  Procedure                               Abnormality         Status                     ---------                               -----------         ------                     CBC Auto Differential[[id number]]         Abnormal            Final result                 Please view results for these tests on the individual orders.    CBC Auto Differential [[id number]]  (Abnormal) Collected:  04/18/17 0029    Specimen:  Blood Updated:  04/18/17 0114     WBC 5.84 10*3/mm3      RBC 3.21 (L) 10*6/mm3      Hemoglobin 9.5 (L) g/dL      Hematocrit 31.1 (L) %      MCV 96.9 (H) fL      MCH 29.6 pg      MCHC 30.5 (L) g/dL      RDW 13.8 %      RDW-SD 49.3 fl      MPV 10.9 (H) fL      Platelets 187 10*3/mm3      Neutrophil % 71.0 (H) %       Lymphocyte % 15.2 (L) %      Monocyte % 9.4 %      Eosinophil % 2.7 %      Basophil % 0.5 %      Immature Grans % 1.2 (H) %      Neutrophils, Absolute 4.14 10*3/mm3      Lymphocytes, Absolute 0.89 (L) 10*3/mm3      Monocytes, Absolute 0.55 10*3/mm3      Eosinophils, Absolute 0.16 10*3/mm3      Basophils, Absolute 0.03 10*3/mm3      Immature Grans, Absolute 0.07 (H) 10*3/mm3     Basic Metabolic Panel [[id number]]  (Abnormal) Collected:  04/18/17 0029    Specimen:  Blood Updated:  04/18/17 0142     Glucose 195 (H) mg/dL      BUN 9 mg/dL      Creatinine 0.94 mg/dL      Sodium 140 mmol/L      Potassium 3.7 mmol/L      Chloride 108 mmol/L      CO2 23.3 (L) mmol/L      Calcium 8.3 mg/dL      eGFR Non African Amer 61 mL/min/1.73      BUN/Creatinine Ratio 9.6     Anion Gap 8.7 mmol/L     Narrative:       GFR Normal >60  Chronic Kidney Disease <60  Kidney Failure <15    Osmolality, Calculated [[id number]]  (Normal) Collected:  04/18/17 0029    Specimen:  Blood Updated:  04/18/17 0142     Osmolality Calc 283.4 mOsm/kg     Culture, Routine [[id number]]  (Abnormal)  (Susceptibility) Collected:  04/15/17 1118    Specimen:  Tissue from Toe, Left Updated:  04/18/17 0715     Culture Light growth (2+) Staphylococcus aureus (A)     Gram Stain Result Occasional Gram positive cocci in pairs    Susceptibility      Staphylococcus aureus     DAVID     Amoxicillin + Clavulanate <=4/2 ug/ml Susceptible     Ampicillin <=2 ug/ml Resistant     Ampicillin + Sulbactam <=8/4 ug/ml Susceptible     Ceftriaxone <=8 ug/ml Susceptible     Ciprofloxacin <=1 ug/ml Susceptible     Clindamycin <=0.5 ug/ml Susceptible     Erythromycin <=0.5 ug/ml Susceptible     Gentamicin <=4 ug/ml Susceptible     Levofloxacin <=1 ug/ml Susceptible  [1]      Moxifloxacin <=0.5 ug/ml Susceptible     Oxacillin <=0.25 ug/ml Susceptible     Penicillin G <=0.03 ug/ml Resistant     Rifampin <=1 ug/ml Susceptible     Tetracycline <=4 ug/ml Susceptible     Trimethoprim + Sulfamethoxazole  <=0.5/9.5 ug/ml Susceptible     Vancomycin 1 ug/ml Susceptible            [1]   Staphylococcus species may develop resistance during prolonged therapy with quinolones.  Isolates that are initially susceptible may become resistant within three to four days after initiation of therapy. Testing of repeat isolates may be warranted.                 POC Glucose Fingerstick [[id number]]  (Abnormal) Collected:  04/18/17 0720    Specimen:  Blood Updated:  04/18/17 0724     Glucose 143 (H) mg/dL     Narrative:       Meter: [id number] : [id number] [employer]    POC Glucose Fingerstick [[id number]]  (Abnormal) Collected:  04/18/17 1201    Specimen:  Blood Updated:  04/18/17 1205     Glucose 145 (H) mg/dL     Narrative:       Meter: [id number] : [id number] [employer]    Blood Culture [[id number]]  (Normal) Collected:  04/16/17 1407    Specimen:  Blood from Arm, Right Updated:  04/18/17 1501     Blood Culture No growth at 2 days    Blood Culture [[id number]]  (Normal) Collected:  04/14/17 1429    Specimen:  Blood from Arm, Right Updated:  04/18/17 1501     Blood Culture No growth at 4 days    Blood Culture [[id number]]  (Normal) Collected:  04/14/17 1519    Specimen:  Blood from Arm, Right Updated:  04/18/17 1601     Blood Culture No growth at 4 days    POC Glucose Fingerstick [[id number]]  (Abnormal) Collected:  04/18/17 1648    Specimen:  Blood Updated:  04/18/17 1652     Glucose 233 (H) mg/dL     Narrative:       Meter: [id number] : [id number] [employer]    Blood Culture [[id number]]  (Normal) Collected:  04/16/17 1522    Specimen:  Blood from Arm, Right Updated:  04/18/17 1701     Blood Culture No growth at 2 days    POC Glucose Fingerstick [[id number]]  (Abnormal) Collected:  04/18/17 2046    Specimen:  Blood Updated:  04/18/17 2110     Glucose 204 (H) mg/dL     Narrative:       Meter: [id number] : [id number] harvey fermin        Imaging Results (last 24 hours)     ** No results found for the last 24 hours. **     "    Assessment:Active Problems:    Cellulitis of foot, left   Abscess left foot  Plan: Home with IV antibiotics O1 okay with infectious disease     Electronically signed by Yandel Paulson MD at 4/19/2017  6:26 AM      Annalise Garcia PA-C at 4/19/2017 11:02 AM  Version 1 of 1           I have personally seen and examined the patient today and discussed overnight interval progress and pertinent issues with nursing staff.    Subjective    She continues to do well on IV cefazolin with no complaints and no issues reported by her nurse.  Normal white count.    Review of Systems    Constitutional: no fever, chills and night sweats. No appetite change or unexpected weight change. No fatigue.  Eyes: no eye drainage, itching or redness.  HEENT: no mouth sores, dysphagia or nose bleed.  Respiratory: no for shortness of breath, cough or production of sputum.  Cardiovascular: no chest pain, no palpitations, no orthopnea.  Gastrointestinal: no nausea, vomiting or diarrhea. No abdominal pain, hematemesis or rectal bleeding.  Genitourinary: no dysuria or polyuria.  Hematologic/lymphatic: no lymph node abnormalities, no easy bruising or easy bleeding.  Musculoskeletal: Positive for foot pain  Skin: Status post I&D of the left foot  Neurological: no loss of consciousness, no seizure, no headache.  Behavioral/Psych: no depression or suicidal ideation.  Endocrine: no hot flashes.  Immunologic: negative.      History taken from: patient chart RN      Vital Signs    /82 (BP Location: Left arm, Patient Position: Lying)  Pulse 67  Temp 98.5 °F (36.9 °C) (Oral)   Resp 18  Ht 66\" (167.6 cm)  Wt 157 lb 9 oz (71.5 kg)  SpO2 97%  BMI 25.43 kg/m2    Temp:  [97.7 °F (36.5 °C)-98.9 °F (37.2 °C)] 98.5 °F (36.9 °C)      Intake/Output Summary (Last 24 hours) at 04/19/17 1102  Last data filed at 04/19/17 0300   Gross per 24 hour   Intake              960 ml   Output                0 ml   Net              960 ml     Intake & " Output (last 3 days)       04/16 0701 - 04/17 0700 04/17 0701 - 04/18 0700 04/18 0701 - 04/19 0700 04/19 0701 - 04/20 0700    P.O. 720 1560 960     I.V. (mL/kg)        Total Intake(mL/kg) 720 (10.1) 1560 (21.8) 960 (13.4)     Net +720 +1560 +960              Unmeasured Urine Occurrence 9 x 5 x 8 x     Unmeasured Stool Occurrence 3 x 1 x 1 x           Physical exam:    General Appearance:    Slightly confused, Alert, cooperative, in no acute distress, nurse at bedside   Head:    Normocephalic, without obvious abnormality, atraumatic   Eyes:            Lids and lashes normal, conjunctivae and sclerae normal, no   icterus, no pallor, corneas clear, PERRLA   Ears:    Ears appear intact with no abnormalities noted   Throat:   No oral lesions, no thrush, oral mucosa moist   Neck:   No adenopathy, supple, trachea midline, no thyromegaly, no   carotid bruit, no JVD   Back:     No tenderness to percussion or palpation, range of motion   normal   Lungs:     Clear to auscultation,respirations regular, even and unlabored. No wheezing, no ronchi and no crackles.    Heart:    Regular rhythm and normal rate, normal S1 and S2, no            murmur, no gallop, no rub, no click   Chest Wall:    No abnormalities observed   Abdomen:     Normal bowel sounds, no masses, no organomegaly, soft        non-tender, non-distended, no guarding, no rebound                tenderness   Rectal:     Deferred   Extremities:   Left foot I&D on Saturday by Dr. Morris that looks significantly better.  The wound is clean without drainage.   Pulses:   Pulses palpable and equal bilaterally   Skin:  Left foot I&D on Saturday by Dr. Morris that looks significantly better.  The wound is clean without drainage.     Lymph nodes:   No palpable adenopathy   Neurologic:   Awake, alert, slightly confused           Results:      Results from last 7 days  Lab Units 04/18/17  0029 04/16/17  0032 04/12/17 2047   WBC 10*3/mm3 5.84 5.79 8.22     Lab Results   Component  Value Date    NEUTROABS 4.14 04/18/2017         Results from last 7 days  Lab Units 04/18/17  0029   CREATININE mg/dL 0.94     Results Review:    I have personally reviewed laboratory data, culture results, radiology studies and antimicrobial therapy.    Hospital Medications (active)       Dose Frequency Start End    acetaminophen (TYLENOL) tablet 487.5 mg 487.5 mg Every 4 Hours PRN 4/13/2017     Sig - Route: Take 1.5 tablets by mouth Every 4 (Four) Hours As Needed for Fever. - Oral    ALPRAZolam (XANAX) tablet 0.5 mg 0.5 mg Once 4/14/2017 4/14/2017    Sig - Route: Take 1 tablet by mouth 1 (One) Time. - Oral    atenolol (TENORMIN) tablet 25 mg 25 mg Daily 4/13/2017     Sig - Route: Take 1 tablet by mouth Daily. - Oral    cefepime (MAXIPIME) 2 g/100 mL 0.9% NS (mbp) 2 g Every 12 Hours 4/14/2017     Sig - Route: Infuse 100 mL into a venous catheter Every 12 (Twelve) Hours. - Intravenous    cetirizine (zyrTEC) tablet 10 mg 10 mg Daily 4/13/2017     Sig - Route: Take 1 tablet by mouth Daily. - Oral    dextrose (D50W) solution 25 g 25 g Every 15 Minutes PRN 4/13/2017     Sig - Route: Infuse 50 mL into a venous catheter Every 15 (Fifteen) Minutes As Needed for Low Blood Sugar (Blood Sugar Less Than 70, Patient Has IV Access - Unresponsive, NPO or Unable To Safely Swallow). - Intravenous    dextrose (GLUTOSE) oral gel 15 g 15 g Every 15 Minutes PRN 4/13/2017     Sig - Route: Take 15 g by mouth Every 15 (Fifteen) Minutes As Needed for Low Blood Sugar (Blood Sugar Less Than 70, Patient Alert, Is Not NPO & Can Safely Swallow). - Oral    FLUoxetine (PROzac) capsule 20 mg 20 mg Daily 4/13/2017     Sig - Route: Take 1 capsule by mouth Daily. - Oral    gabapentin (NEURONTIN) capsule 800 mg 800 mg Every 8 Hours Scheduled 4/13/2017     Sig - Route: Take 2 capsules by mouth Every 8 (Eight) Hours. - Oral    glucagon (human recombinant) (GLUCAGEN DIAGNOSTIC) injection 1 mg 1 mg Every 15 Minutes PRN 4/13/2017     Sig - Route: Inject 1  mg under the skin Every 15 (Fifteen) Minutes As Needed (Blood Glucose Less Than 70 - Patient Without IV Access - Unresponsive, NPO or Unable To Safely Swallow). - Subcutaneous    HYDROcodone-acetaminophen (NORCO)  MG per tablet 1 tablet 1 tablet Every 8 Hours Scheduled 4/13/2017     Sig - Route: Take 1 tablet by mouth Every 8 (Eight) Hours. - Oral    hydrOXYzine (ATARAX) tablet 10 mg 10 mg Daily 4/13/2017     Sig - Route: Take 1 tablet by mouth Daily. - Oral    insulin aspart (novoLOG) injection 0-9 Units 0-9 Units 4 Times Daily Before Meals & Nightly 4/13/2017     Sig - Route: Inject 0-9 Units under the skin 4 (Four) Times a Day Before Meals & at Bedtime. - Subcutaneous    insulin detemir (LEVEMIR) injection 22 Units 22 Units Nightly 4/13/2017     Sig - Route: Inject 22 Units under the skin Every Night. - Subcutaneous    lidocaine PF 1% (XYLOCAINE) injection 10 mL 10 mL Once 4/12/2017     Sig - Route: Inject 10 mL as directed 1 (One) Time. - Injection    Cosign for Ordering: Accepted by Carlos Piper MD on 4/12/2017 10:11 PM    metroNIDAZOLE (FLAGYL) IVPB 500 mg 500 mg Every 8 Hours 4/13/2017     Sig - Route: Infuse 100 mL into a venous catheter Every 8 (Eight) Hours. - Intravenous    Cosign for Ordering: Accepted by Ricci Rubio MD on 4/13/2017 12:50 PM    morphine injection 2 mg 2 mg Every 2 Hours PRN 4/13/2017 4/23/2017    Sig - Route: Infuse 1 mL into a venous catheter Every 2 (Two) Hours As Needed for Severe Pain (7-10). - Intravenous    Pharmacy Meds to Bed Consult  Daily 4/13/2017     Sig - Route: Daily. - Does not apply    sodium chloride 0.9 % infusion 75 mL/hr Continuous 4/13/2017     Sig - Route: Infuse 75 mL/hr into a venous catheter Continuous. - Intravenous    temazepam (RESTORIL) capsule 30 mg 30 mg Nightly PRN 4/13/2017     Sig - Route: Take 2 capsules by mouth At Night As Needed for Sleep. - Oral    tiZANidine (ZANAFLEX) tablet 4 mg 4 mg Every 6 Hours Scheduled 4/13/2017     Sig -  Route: Take 1 tablet by mouth Every 6 (Six) Hours. - Oral    vancomycin (VANCOCIN) 1,000 mg in sodium chloride 0.9 % 250 mL IVPB 1,000 mg Every 24 Hours 4/13/2017     Sig - Route: Infuse 1,000 mg into a venous catheter Daily. - Intravenous    cefepime (MAXIPIME) 2 g/100 mL 0.9% NS (mbp) (Discontinued) 2 g Every 8 Hours 4/13/2017 4/14/2017    Sig - Route: Infuse 100 mL into a venous catheter Every 8 (Eight) Hours. - Intravenous            Cultures:    Blood Culture   Date Value Ref Range Status   04/12/2017 No growth at 24 hours  Preliminary   04/12/2017 No growth at 24 hours  Preliminary     Wound Culture   Date Value Ref Range Status   04/12/2017 No growth at 24 hours  Preliminary       PROBLEM LIST:    Patient Active Problem List   Diagnosis   • Cellulitis of foot, left       Assessment/Plan     ASSESSMENT:    1. Abscess of the foot     PLAN:    The patient is tolerating high-dose IV cefazolin 2 g IV every 8 hours and if the patient is unable to do every 8 hours dosing then high-dose Rocephin or daptomycin would be adequate for the patient upon discharge through 5/15/2017.    Left foot I&D on Saturday by Dr. Morris that looks significantly better.  The wound is clean without drainage.   Left toe culture from 4/15/2017 finalized MSSA which is the same as the culture from 4/12/2017.        Even though the MRI shows no osteomyelitis the patient's clinical presentation with plantar and dorsal cellulitis and bruising as well as CRP level of 30 are all indicative of acute osteomyelitis.  For now would treat as such.  IV antibiotic therapy will be recommended to continue through 5/15/2017 as the patient carries a very high morbidity and guarded limb prognosis.    The patient is okay to discharge from infectious disease standpoint on IV antibiotic therapy.    In the setting of diarrhea and broad-spectrum antibiotic coverage C. difficile toxin PCR was ordered as well as Risaquad.         Patients findings and recommendations  were discussed with patient and nursing staff    Code Status: Full Code     Written by Annalise Garcia PA-C, acting as scribe for Dr. Rubio.    Annalise Garcia PA-C  04/19/17  11:02 AM         Electronically signed by Annalise Garcia PA-C at 4/19/2017 11:05 AM

## 2017-04-20 ENCOUNTER — APPOINTMENT (OUTPATIENT)
Dept: GENERAL RADIOLOGY | Facility: HOSPITAL | Age: 57
End: 2017-04-20

## 2017-04-20 VITALS
RESPIRATION RATE: 18 BRPM | HEART RATE: 64 BPM | HEIGHT: 66 IN | DIASTOLIC BLOOD PRESSURE: 73 MMHG | OXYGEN SATURATION: 95 % | BODY MASS INDEX: 25.32 KG/M2 | TEMPERATURE: 97.9 F | WEIGHT: 157.56 LBS | SYSTOLIC BLOOD PRESSURE: 175 MMHG

## 2017-04-20 LAB
GLUCOSE BLDC GLUCOMTR-MCNC: 103 MG/DL (ref 70–130)
GLUCOSE BLDC GLUCOMTR-MCNC: 208 MG/DL (ref 70–130)

## 2017-04-20 PROCEDURE — 25010000003 CEFAZOLIN PER 500 MG: Performed by: PHYSICIAN ASSISTANT

## 2017-04-20 PROCEDURE — C1751 CATH, INF, PER/CENT/MIDLINE: HCPCS

## 2017-04-20 PROCEDURE — 63710000001 INSULIN ASPART PER 5 UNITS: Performed by: FAMILY MEDICINE

## 2017-04-20 PROCEDURE — 94799 UNLISTED PULMONARY SVC/PX: CPT

## 2017-04-20 PROCEDURE — 82962 GLUCOSE BLOOD TEST: CPT

## 2017-04-20 PROCEDURE — 71010 XR CHEST 1 VW: CPT | Performed by: RADIOLOGY

## 2017-04-20 PROCEDURE — 25010000002 CEFTRIAXONE: Performed by: FAMILY MEDICINE

## 2017-04-20 PROCEDURE — 25010000002 ENOXAPARIN PER 10 MG: Performed by: PODIATRIST

## 2017-04-20 PROCEDURE — 71010 HC CHEST PA OR AP: CPT

## 2017-04-20 RX ORDER — SODIUM CHLORIDE 0.9 % (FLUSH) 0.9 %
10 SYRINGE (ML) INJECTION EVERY 12 HOURS SCHEDULED
Status: DISCONTINUED | OUTPATIENT
Start: 2017-04-20 | End: 2017-04-20 | Stop reason: HOSPADM

## 2017-04-20 RX ORDER — SODIUM HYPOCHLORITE 2.5 MG/ML
SOLUTION TOPICAL EVERY 12 HOURS
Qty: 360 ML | Refills: 0 | Status: ON HOLD | OUTPATIENT
Start: 2017-04-20 | End: 2017-05-25

## 2017-04-20 RX ORDER — SODIUM CHLORIDE 0.9 % (FLUSH) 0.9 %
10 SYRINGE (ML) INJECTION AS NEEDED
Status: DISCONTINUED | OUTPATIENT
Start: 2017-04-20 | End: 2017-04-20 | Stop reason: HOSPADM

## 2017-04-20 RX ADMIN — CEFTRIAXONE 2 G: 2 INJECTION, POWDER, FOR SOLUTION INTRAMUSCULAR; INTRAVENOUS at 10:28

## 2017-04-20 RX ADMIN — ENOXAPARIN SODIUM 40 MG: 40 INJECTION SUBCUTANEOUS at 10:28

## 2017-04-20 RX ADMIN — FLUOXETINE 20 MG: 20 CAPSULE ORAL at 10:27

## 2017-04-20 RX ADMIN — TIZANIDINE 4 MG: 4 TABLET ORAL at 05:25

## 2017-04-20 RX ADMIN — CETIRIZINE HYDROCHLORIDE 10 MG: 10 TABLET ORAL at 10:27

## 2017-04-20 RX ADMIN — GABAPENTIN 800 MG: 400 CAPSULE ORAL at 05:25

## 2017-04-20 RX ADMIN — TIZANIDINE 4 MG: 4 TABLET ORAL at 12:22

## 2017-04-20 RX ADMIN — INSULIN ASPART 4 UNITS: 100 INJECTION, SOLUTION INTRAVENOUS; SUBCUTANEOUS at 12:22

## 2017-04-20 RX ADMIN — SODIUM HYPOCHLORITE: 2.5 SOLUTION TOPICAL at 10:30

## 2017-04-20 RX ADMIN — TIZANIDINE 4 MG: 4 TABLET ORAL at 00:55

## 2017-04-20 RX ADMIN — HYDROCODONE BITARTRATE AND ACETAMINOPHEN 1 TABLET: 7.5; 325 TABLET ORAL at 12:21

## 2017-04-20 RX ADMIN — Medication 10 ML: at 10:30

## 2017-04-20 RX ADMIN — Medication 1 CAPSULE: at 10:28

## 2017-04-20 RX ADMIN — HYDROCODONE BITARTRATE AND ACETAMINOPHEN 1 TABLET: 10; 325 TABLET ORAL at 05:25

## 2017-04-20 RX ADMIN — Medication 10 ML: at 12:23

## 2017-04-20 RX ADMIN — ATENOLOL 25 MG: 25 TABLET ORAL at 10:28

## 2017-04-20 RX ADMIN — HYDROXYZINE HYDROCHLORIDE 10 MG: 10 TABLET ORAL at 10:27

## 2017-04-20 RX ADMIN — CEFAZOLIN SODIUM 2 G: 2 SOLUTION INTRAVENOUS at 05:00

## 2017-04-20 NOTE — DISCHARGE PLACEMENT REQUEST
"Reny Merino (57 y.o. Female)     Date of Birth Social Security Number Address Home Phone MRN    1960  [address] [place] [address] [phone] [id number]    Christianity Marital Status          None Single       Admission Date Admission Type Admitting Provider Attending Provider Department, Room/Bed    4/12/17 Emergency Yandel Paulson MD Watts, John Michael, MD [address], [facility]8/    Discharge Date Discharge Disposition Discharge Destination         Home-Health Care [facility]             Attending Provider: Yandel Paulson MD     Allergies:  Codeine, Latex, Penicillins, Sulfa Antibiotics    Isolation:  None   Infection:  None   Code Status:  FULL    Ht:  66\" (167.6 cm)   Wt:  157 lb 9 oz (71.5 kg)    Admission Cmt:  None   Principal Problem:  None                Active Insurance as of 4/12/2017     Primary Coverage     Payor Plan Insurance Group Employer/Plan Group    [employer] [place]      Payor Plan Address Payor Plan Phone Number Effective From Effective To    [address] [address] [phone] 4/12/2017     [place] [address]       Subscriber Name Subscriber Birth Date Member ID       RENY MERINO 1960 [id number]                 Emergency Contacts      (Rel.) Home Phone Work Phone Mobile Phone    Liza Oliva (Daughter) [phone] -- --              Discharge Summary     No notes of this type exist for this encounter.        Discharge Order     Start     Ordered    04/20/17 1000  Discharge patient  Once     Comments:  Continue IV Antibiotics per Home Health   Expected Discharge Date:  04/20/17    Discharge Disposition:  Home-Health Care Svc    Please choose which facility the patient is currently admitted if they are being discharged to another facility or unit.:  [facility]       Question:  Please choose which facility the patient is currently admitted if they are being discharged to another facility or unit.  Answer:  [facility]    " 04/20/17 1000

## 2017-04-20 NOTE — PROGRESS NOTES
Discharge Planning Assessment   Jose Alfredo     Patient Name: Reny Elena  MRN: [id number]  Today's Date: 4/20/2017    Admit Date: 4/12/2017       Discharge Plan       04/20/17 1224    Final Note    Final Note Pt is being discharged home today. SS received prescriptions for dakins, wound care and IV Rocephin 2 grams every 24 hours through 5-15-17. SS contacted [facility] per Jd and [facility] per Mariola. Nurse to call report to [facility] to deliver IV antibiotics to pt's home. Pt's next dose of IV antibiotics will be due on 4-21-17 at 10:28. No other needs identified.         Discharge Placement     Facility/Agency Request Status Selected? Address Phone Number Fax Number    [facility] Accepted     [address] [facility] [id number] [phone]        Expected Discharge Date and Time     Expected Discharge Date Expected Discharge Time    Apr 20, 2017                Jihan Dugan

## 2017-04-20 NOTE — DISCHARGE INSTR - APPOINTMENTS
Follow-up appointment has been scheduled for you to see Dr. Paulson on April 24, 2017 at 2:45 pm.  You can reach the office at .                                                                                                                                                                                                                                                                                                                                                                                                                                                                                                                                                                                                                                                                                                                                Follow-up appointment has been scheduled for you to see Dr. Morris on May 5, 2017 at 1:00 pm.  You can reach the office at .

## 2017-04-20 NOTE — PLAN OF CARE
Problem: Patient Care Overview (Adult)  Goal: Plan of Care Review  Outcome: Ongoing (interventions implemented as appropriate)    04/19/17 1422 04/19/17 2100   Coping/Psychosocial Response Interventions   Plan Of Care Reviewed With --  patient   Patient Care Overview   Progress improving --        Goal: Adult Individualization and Mutuality  Outcome: Ongoing (interventions implemented as appropriate)    Problem: Cellulitis (Adult)  Goal: Signs and Symptoms of Listed Potential Problems Will be Absent or Manageable (Cellulitis)  Outcome: Ongoing (interventions implemented as appropriate)    Problem: Pressure Ulcer Risk (Helio Scale) (Adult,Obstetrics,Pediatric)  Goal: Identify Related Risk Factors and Signs and Symptoms  Outcome: Ongoing (interventions implemented as appropriate)  Goal: Skin Integrity  Outcome: Ongoing (interventions implemented as appropriate)    Problem: Pain, Acute (Adult)  Goal: Identify Related Risk Factors and Signs and Symptoms  Outcome: Ongoing (interventions implemented as appropriate)  Goal: Acceptable Pain Control/Comfort Level  Outcome: Ongoing (interventions implemented as appropriate)    Problem: Perioperative Period (Adult)  Goal: Signs and Symptoms of Listed Potential Problems Will be Absent or Manageable (Perioperative Period)  Outcome: Ongoing (interventions implemented as appropriate)

## 2017-04-20 NOTE — CONSULTS
"Diabetes Education  Assessment/Teaching    Patient Name:  Reny Elena  YOB: 1960  MRN: [id number]  Admit Date:  4/12/2017      Assessment Date:  4/20/2017       Most Recent Value    General Information      Height  5' 6\" (1.676 m)    Height Method  Stated    Weight  157 lb 9 oz (71.5 kg)    Weight Method  Bed scale    Pregnancy Assessment     Diabetes History     What type of diabetes do you have?  Type 2    Length of Diabetes Diagnosis  10 + years [\"20 yrs\"]    Have you had diabetes education/teaching in the past?  yes [with my mother 5/6 yrs ago]    Do you test your blood sugar at home?  yes    How would you rate your diabetes control?  -- [\"good while I have been in here\"]    What makes it difficult for you to take care of your diabetes or yourself?  surgery on feet    Do you have any diabetes complications?  foot ulcers    Education Preferences     Nutrition Information     What is the biggest challenge you have with your diet?  Other (comment) [\"been eating out last year when traveling info on former and 1800 basic info provided]    What type of support do you currently use to help you with your health issues?  problems with chewing ( no teeth) discussed options with food, \"fritos are husbands\"    Assessment Topics     Healthy Eating - Assessment  Competent    Being Active - Assessment  Competent    Taking Medication - Assessment  Competent    Problem Solving - Assessment  Competent    Reducing Risk - Assessment  Competent    Healthy Coping - Assessment  Competent    Monitoring - Assessment  Competent    DM Goals                Most Recent Value    DM Education Needs     Meter  Has own    Frequency of Testing  3 times a day    Medication  Insulin    Problem Solving  Hypoglycemia, Hyperglycemia, Sick days, Signs, Symptoms, Treatment, Other (comment)    Reducing Risks  A1C testing, Foot care, Other (comment)    Healthy Eating  Reviewed meal plan    Healthy Coping  Appropriate    Discharge Plan  " Home, Follow-up with PCP, Home Care    Motivation  Moderate    Teaching Method  Explanation, Discussion, Handouts, Teach back    Patient Response  Verbalized understanding            Other Comments:          Electronically signed by:  Karrie Rodríguez RN  04/20/17 12:12 PM

## 2017-04-20 NOTE — PROGRESS NOTES
"  I have personally seen and examined the patient today and discussed overnight interval progress and pertinent issues with nursing staff.    Subjective    The patient continues to show improvement with improvement of her wound and no complaints.     Review of Systems    Constitutional: no fever, chills and night sweats. No appetite change or unexpected weight change. No fatigue.  Eyes: no eye drainage, itching or redness.  HEENT: no mouth sores, dysphagia or nose bleed.  Respiratory: no for shortness of breath, cough or production of sputum.  Cardiovascular: no chest pain, no palpitations, no orthopnea.  Gastrointestinal: no nausea, vomiting or diarrhea. No abdominal pain, hematemesis or rectal bleeding.  Genitourinary: no dysuria or polyuria.  Hematologic/lymphatic: no lymph node abnormalities, no easy bruising or easy bleeding.  Musculoskeletal: Positive for foot pain  Skin: Status post I&D of the left foot  Neurological: no loss of consciousness, no seizure, no headache.  Behavioral/Psych: no depression or suicidal ideation.  Endocrine: no hot flashes.  Immunologic: negative.      History taken from: patient chart RN      Vital Signs    /73 (BP Location: Left arm, Patient Position: Lying)  Pulse 64  Temp 97.9 °F (36.6 °C) (Oral)   Resp 18  Ht 66\" (167.6 cm)  Wt 157 lb 9 oz (71.5 kg)  SpO2 94%  BMI 25.43 kg/m2    Temp:  [97.9 °F (36.6 °C)-98.6 °F (37 °C)] 97.9 °F (36.6 °C)      Intake/Output Summary (Last 24 hours) at 04/20/17 1008  Last data filed at 04/19/17 2300   Gross per 24 hour   Intake             1440 ml   Output                0 ml   Net             1440 ml     Intake & Output (last 3 days)       04/17 0701 - 04/18 0700 04/18 0701 - 04/19 0700 04/19 0701 - 04/20 0700 04/20 0701 - 04/21 0700    P.O. 9366 [id number] 1254     Total Intake(mL/kg) 1560 (21.8) 960 (13.4) 1440 (20.1)     Net +1560 +960 +1440              Unmeasured Urine Occurrence 5 x 8 x 8 x     Unmeasured Stool Occurrence 1 x 1 x 0 x  "          Physical exam:    General Appearance:    Slightly confused, Alert, cooperative, in no acute distress, nurse at bedside   Head:    Normocephalic, without obvious abnormality, atraumatic   Eyes:            Lids and lashes normal, conjunctivae and sclerae normal, no   icterus, no pallor, corneas clear, PERRLA   Ears:    Ears appear intact with no abnormalities noted   Throat:   No oral lesions, no thrush, oral mucosa moist   Neck:   No adenopathy, supple, trachea midline, no thyromegaly, no   carotid bruit, no JVD   Back:     No tenderness to percussion or palpation, range of motion   normal   Lungs:     Clear to auscultation,respirations regular, even and unlabored. No wheezing, no ronchi and no crackles.    Heart:    Regular rhythm and normal rate, normal S1 and S2, no            murmur, no gallop, no rub, no click   Chest Wall:    No abnormalities observed   Abdomen:     Normal bowel sounds, no masses, no organomegaly, soft        non-tender, non-distended, no guarding, no rebound                tenderness   Rectal:     Deferred   Extremities:   Left foot I&D on Saturday by Dr. Morris that looks significantly better.  The wound is clean without drainage.   Pulses:   Pulses palpable and equal bilaterally   Skin:  Left foot I&D on Saturday by Dr. Morris that looks significantly better.  The wound is clean without drainage.     Lymph nodes:   No palpable adenopathy   Neurologic:   Awake, alert, slightly confused           Results:      Results from last 7 days  Lab Units 04/18/17  0029 04/16/17  0032   WBC 10*3/mm3 5.84 5.79     Lab Results   Component Value Date    NEUTROABS 4.14 04/18/2017         Results from last 7 days  Lab Units 04/18/17  0029   CREATININE mg/dL 0.94     Results Review:    I have personally reviewed laboratory data, culture results, radiology studies and antimicrobial therapy.    Hospital Medications (active)       Dose Frequency Start End    acetaminophen (TYLENOL) tablet 487.5 mg 487.5 mg  Every 4 Hours PRN 4/13/2017     Sig - Route: Take 1.5 tablets by mouth Every 4 (Four) Hours As Needed for Fever. - Oral    ALPRAZolam (XANAX) tablet 0.5 mg 0.5 mg Once 4/14/2017 4/14/2017    Sig - Route: Take 1 tablet by mouth 1 (One) Time. - Oral    atenolol (TENORMIN) tablet 25 mg 25 mg Daily 4/13/2017     Sig - Route: Take 1 tablet by mouth Daily. - Oral    cefepime (MAXIPIME) 2 g/100 mL 0.9% NS (mbp) 2 g Every 12 Hours 4/14/2017     Sig - Route: Infuse 100 mL into a venous catheter Every 12 (Twelve) Hours. - Intravenous    cetirizine (zyrTEC) tablet 10 mg 10 mg Daily 4/13/2017     Sig - Route: Take 1 tablet by mouth Daily. - Oral    dextrose (D50W) solution 25 g 25 g Every 15 Minutes PRN 4/13/2017     Sig - Route: Infuse 50 mL into a venous catheter Every 15 (Fifteen) Minutes As Needed for Low Blood Sugar (Blood Sugar Less Than 70, Patient Has IV Access - Unresponsive, NPO or Unable To Safely Swallow). - Intravenous    dextrose (GLUTOSE) oral gel 15 g 15 g Every 15 Minutes PRN 4/13/2017     Sig - Route: Take 15 g by mouth Every 15 (Fifteen) Minutes As Needed for Low Blood Sugar (Blood Sugar Less Than 70, Patient Alert, Is Not NPO & Can Safely Swallow). - Oral    FLUoxetine (PROzac) capsule 20 mg 20 mg Daily 4/13/2017     Sig - Route: Take 1 capsule by mouth Daily. - Oral    gabapentin (NEURONTIN) capsule 800 mg 800 mg Every 8 Hours Scheduled 4/13/2017     Sig - Route: Take 2 capsules by mouth Every 8 (Eight) Hours. - Oral    glucagon (human recombinant) (GLUCAGEN DIAGNOSTIC) injection 1 mg 1 mg Every 15 Minutes PRN 4/13/2017     Sig - Route: Inject 1 mg under the skin Every 15 (Fifteen) Minutes As Needed (Blood Glucose Less Than 70 - Patient Without IV Access - Unresponsive, NPO or Unable To Safely Swallow). - Subcutaneous    HYDROcodone-acetaminophen (NORCO)  MG per tablet 1 tablet 1 tablet Every 8 Hours Scheduled 4/13/2017     Sig - Route: Take 1 tablet by mouth Every 8 (Eight) Hours. - Oral     hydrOXYzine (ATARAX) tablet 10 mg 10 mg Daily 4/13/2017     Sig - Route: Take 1 tablet by mouth Daily. - Oral    insulin aspart (novoLOG) injection 0-9 Units 0-9 Units 4 Times Daily Before Meals & Nightly 4/13/2017     Sig - Route: Inject 0-9 Units under the skin 4 (Four) Times a Day Before Meals & at Bedtime. - Subcutaneous    insulin detemir (LEVEMIR) injection 22 Units 22 Units Nightly 4/13/2017     Sig - Route: Inject 22 Units under the skin Every Night. - Subcutaneous    lidocaine PF 1% (XYLOCAINE) injection 10 mL 10 mL Once 4/12/2017     Sig - Route: Inject 10 mL as directed 1 (One) Time. - Injection    Cosign for Ordering: Accepted by Carlos Piper MD on 4/12/2017 10:11 PM    metroNIDAZOLE (FLAGYL) IVPB 500 mg 500 mg Every 8 Hours 4/13/2017     Sig - Route: Infuse 100 mL into a venous catheter Every 8 (Eight) Hours. - Intravenous    Cosign for Ordering: Accepted by Ricci Rubio MD on 4/13/2017 12:50 PM    morphine injection 2 mg 2 mg Every 2 Hours PRN 4/13/2017 4/23/2017    Sig - Route: Infuse 1 mL into a venous catheter Every 2 (Two) Hours As Needed for Severe Pain (7-10). - Intravenous    Pharmacy Meds to Bed Consult  Daily 4/13/2017     Sig - Route: Daily. - Does not apply    sodium chloride 0.9 % infusion 75 mL/hr Continuous 4/13/2017     Sig - Route: Infuse 75 mL/hr into a venous catheter Continuous. - Intravenous    temazepam (RESTORIL) capsule 30 mg 30 mg Nightly PRN 4/13/2017     Sig - Route: Take 2 capsules by mouth At Night As Needed for Sleep. - Oral    tiZANidine (ZANAFLEX) tablet 4 mg 4 mg Every 6 Hours Scheduled 4/13/2017     Sig - Route: Take 1 tablet by mouth Every 6 (Six) Hours. - Oral    vancomycin (VANCOCIN) 1,000 mg in sodium chloride 0.9 % 250 mL IVPB 1,000 mg Every 24 Hours 4/13/2017     Sig - Route: Infuse 1,000 mg into a venous catheter Daily. - Intravenous    cefepime (MAXIPIME) 2 g/100 mL 0.9% NS (mbp) (Discontinued) 2 g Every 8 Hours 4/13/2017 4/14/2017    Sig - Route:  Infuse 100 mL into a venous catheter Every 8 (Eight) Hours. - Intravenous            Cultures:    Blood Culture   Date Value Ref Range Status   04/12/2017 No growth at 24 hours  Preliminary   04/12/2017 No growth at 24 hours  Preliminary     Wound Culture   Date Value Ref Range Status   04/12/2017 No growth at 24 hours  Preliminary       PROBLEM LIST:    Patient Active Problem List   Diagnosis   • Cellulitis of foot, left       Assessment/Plan     ASSESSMENT:    1. Abscess of the foot     PLAN:    The patient continues to show improvement with improvement of her wound and no complaints.  Would continue with recommendations below.    The patient is tolerating high-dose IV cefazolin 2 g IV every 8 hours and if the patient is unable to do every 8 hours dosing then high-dose Rocephin or daptomycin would be adequate for the patient upon discharge through 5/15/2017.    Left foot I&D on Saturday by Dr. Morris that looks significantly better.  The wound is clean without drainage.   Left toe culture from 4/15/2017 finalized MSSA which is the same as the culture from 4/12/2017.        Even though the MRI shows no osteomyelitis the patient's clinical presentation with plantar and dorsal cellulitis and bruising as well as CRP level of 30 are all indicative of acute osteomyelitis.  For now would treat as such.  IV antibiotic therapy will be recommended to continue through 5/15/2017 as the patient carries a very high morbidity and guarded limb prognosis.    The patient is okay to discharge from infectious disease standpoint on IV antibiotic therapy.    In the setting of diarrhea and broad-spectrum antibiotic coverage C. difficile toxin PCR was ordered as well as Risaquad.     Infectious Disease will sign off at this time. Please call back with any questions. Thank you!    Patients findings and recommendations were discussed with patient and nursing staff    Code Status: Full Code       Annalise Garcia PA-C  04/20/17  10:08  AM

## 2017-04-21 LAB
BACTERIA SPEC AEROBE CULT: NORMAL
BACTERIA SPEC AEROBE CULT: NORMAL

## 2017-04-27 ENCOUNTER — LAB REQUISITION (OUTPATIENT)
Dept: LAB | Facility: HOSPITAL | Age: 57
End: 2017-04-27

## 2017-04-27 DIAGNOSIS — E11.40 TYPE 2 DIABETES MELLITUS WITH DIABETIC NEUROPATHY (HCC): ICD-10-CM

## 2017-04-27 LAB — HBA1C MFR BLD: 9.2 % (ref 4.5–5.7)

## 2017-04-27 PROCEDURE — 83036 HEMOGLOBIN GLYCOSYLATED A1C: CPT | Performed by: FAMILY MEDICINE

## 2017-05-08 ENCOUNTER — LAB REQUISITION (OUTPATIENT)
Dept: LAB | Facility: HOSPITAL | Age: 57
End: 2017-05-08

## 2017-05-08 DIAGNOSIS — R89.9 UNSPECIFIED ABNORMAL FINDING IN SPECIMENS FROM OTHER ORGANS, SYSTEMS AND TISSUES: ICD-10-CM

## 2017-05-08 LAB
BASOPHILS # BLD AUTO: 0.04 10*3/MM3 (ref 0–0.3)
BASOPHILS NFR BLD AUTO: 1.2 % (ref 0–2)
CRP SERPL-MCNC: <0.5 MG/DL (ref 0–0.99)
DEPRECATED RDW RBC AUTO: 45 FL (ref 37–54)
EOSINOPHIL # BLD AUTO: 0.19 10*3/MM3 (ref 0–0.7)
EOSINOPHIL NFR BLD AUTO: 5.8 % (ref 0–5)
ERYTHROCYTE [DISTWIDTH] IN BLOOD BY AUTOMATED COUNT: 13.8 % (ref 11.5–14.5)
ERYTHROCYTE [SEDIMENTATION RATE] IN BLOOD: 29 MM/HR (ref 0–30)
HCT VFR BLD AUTO: 32.7 % (ref 37–47)
HGB BLD-MCNC: 10.2 G/DL (ref 12–16)
IMM GRANULOCYTES # BLD: 0.01 10*3/MM3 (ref 0–0.03)
IMM GRANULOCYTES NFR BLD: 0.3 % (ref 0–0.5)
LYMPHOCYTES # BLD AUTO: 1.39 10*3/MM3 (ref 1–3)
LYMPHOCYTES NFR BLD AUTO: 42.8 % (ref 21–51)
MCH RBC QN AUTO: 29.2 PG (ref 27–33)
MCHC RBC AUTO-ENTMCNC: 31.2 G/DL (ref 33–37)
MCV RBC AUTO: 93.7 FL (ref 80–94)
MONOCYTES # BLD AUTO: 0.28 10*3/MM3 (ref 0.1–0.9)
MONOCYTES NFR BLD AUTO: 8.6 % (ref 0–10)
NEUTROPHILS # BLD AUTO: 1.34 10*3/MM3 (ref 1.4–6.5)
NEUTROPHILS NFR BLD AUTO: 41.3 % (ref 30–70)
PLATELET # BLD AUTO: 132 10*3/MM3 (ref 130–400)
PMV BLD AUTO: 12.1 FL (ref 6–10)
RBC # BLD AUTO: 3.49 10*6/MM3 (ref 4.2–5.4)
WBC NRBC COR # BLD: 3.25 10*3/MM3 (ref 4.5–12.5)

## 2017-05-08 PROCEDURE — 85025 COMPLETE CBC W/AUTO DIFF WBC: CPT | Performed by: PODIATRIST

## 2017-05-08 PROCEDURE — 85652 RBC SED RATE AUTOMATED: CPT | Performed by: PODIATRIST

## 2017-05-08 PROCEDURE — 87186 SC STD MICRODIL/AGAR DIL: CPT | Performed by: PODIATRIST

## 2017-05-08 PROCEDURE — 87070 CULTURE OTHR SPECIMN AEROBIC: CPT | Performed by: PODIATRIST

## 2017-05-08 PROCEDURE — 86140 C-REACTIVE PROTEIN: CPT | Performed by: PODIATRIST

## 2017-05-08 PROCEDURE — 87077 CULTURE AEROBIC IDENTIFY: CPT | Performed by: PODIATRIST

## 2017-05-08 PROCEDURE — 87205 SMEAR GRAM STAIN: CPT | Performed by: PODIATRIST

## 2017-05-11 LAB
BACTERIA SPEC AEROBE CULT: ABNORMAL
BACTERIA SPEC AEROBE CULT: ABNORMAL
GRAM STN SPEC: ABNORMAL

## 2017-05-15 ENCOUNTER — HOSPITAL ENCOUNTER (EMERGENCY)
Facility: HOSPITAL | Age: 57
Discharge: HOME | End: 2017-05-15
Attending: EMERGENCY MEDICINE | Admitting: EMERGENCY MEDICINE

## 2017-05-15 ENCOUNTER — APPOINTMENT (OUTPATIENT)
Dept: CT IMAGING | Facility: HOSPITAL | Age: 57
End: 2017-05-15

## 2017-05-15 ENCOUNTER — APPOINTMENT (OUTPATIENT)
Dept: GENERAL RADIOLOGY | Facility: HOSPITAL | Age: 57
End: 2017-05-15

## 2017-05-15 VITALS
WEIGHT: 145 LBS | RESPIRATION RATE: 18 BRPM | SYSTOLIC BLOOD PRESSURE: 127 MMHG | OXYGEN SATURATION: 95 % | BODY MASS INDEX: 24.16 KG/M2 | HEART RATE: 76 BPM | TEMPERATURE: 98.8 F | HEIGHT: 65 IN | DIASTOLIC BLOOD PRESSURE: 71 MMHG

## 2017-05-15 DIAGNOSIS — I10 ESSENTIAL HYPERTENSION: ICD-10-CM

## 2017-05-15 DIAGNOSIS — R51.9 NONINTRACTABLE EPISODIC HEADACHE, UNSPECIFIED HEADACHE TYPE: Primary | ICD-10-CM

## 2017-05-15 LAB
ALBUMIN SERPL-MCNC: 4.1 G/DL (ref 3.5–5)
ALBUMIN/GLOB SERPL: 1.2 G/DL (ref 1.5–2.5)
ALP SERPL-CCNC: 104 U/L (ref 35–104)
ALT SERPL W P-5'-P-CCNC: 25 U/L (ref 10–36)
ANION GAP SERPL CALCULATED.3IONS-SCNC: 2.9 MMOL/L (ref 3.6–11.2)
AST SERPL-CCNC: 30 U/L (ref 10–30)
BACTERIA UR QL AUTO: ABNORMAL /HPF
BASOPHILS # BLD AUTO: 0.05 10*3/MM3 (ref 0–0.3)
BASOPHILS NFR BLD AUTO: 1 % (ref 0–2)
BILIRUB SERPL-MCNC: 0.3 MG/DL (ref 0.2–1.8)
BILIRUB UR QL STRIP: NEGATIVE
BUN BLD-MCNC: 10 MG/DL (ref 7–21)
BUN/CREAT SERPL: 11.2 (ref 7–25)
CALCIUM SPEC-SCNC: 9.5 MG/DL (ref 7.7–10)
CHLORIDE SERPL-SCNC: 109 MMOL/L (ref 99–112)
CK MB SERPL-CCNC: 1.54 NG/ML (ref 0–5)
CK MB SERPL-RTO: 1.7 % (ref 0–3)
CK SERPL-CCNC: 89 U/L (ref 24–173)
CLARITY UR: CLEAR
CO2 SERPL-SCNC: 32.1 MMOL/L (ref 24.3–31.9)
COLOR UR: YELLOW
CREAT BLD-MCNC: 0.89 MG/DL (ref 0.43–1.29)
CRP SERPL-MCNC: <0.5 MG/DL (ref 0–0.99)
DEPRECATED RDW RBC AUTO: 45.3 FL (ref 37–54)
EOSINOPHIL # BLD AUTO: 0.17 10*3/MM3 (ref 0–0.7)
EOSINOPHIL NFR BLD AUTO: 3.3 % (ref 0–5)
ERYTHROCYTE [DISTWIDTH] IN BLOOD BY AUTOMATED COUNT: 13.9 % (ref 11.5–14.5)
GFR SERPL CREATININE-BSD FRML MDRD: 65 ML/MIN/1.73
GLOBULIN UR ELPH-MCNC: 3.4 GM/DL
GLUCOSE BLD-MCNC: 147 MG/DL (ref 70–110)
GLUCOSE UR STRIP-MCNC: NEGATIVE MG/DL
HCT VFR BLD AUTO: 37.1 % (ref 37–47)
HGB BLD-MCNC: 11.6 G/DL (ref 12–16)
HGB UR QL STRIP.AUTO: NEGATIVE
HYALINE CASTS UR QL AUTO: ABNORMAL /LPF
IMM GRANULOCYTES # BLD: 0 10*3/MM3 (ref 0–0.03)
IMM GRANULOCYTES NFR BLD: 0 % (ref 0–0.5)
KETONES UR QL STRIP: NEGATIVE
LEUKOCYTE ESTERASE UR QL STRIP.AUTO: NEGATIVE
LYMPHOCYTES # BLD AUTO: 1.42 10*3/MM3 (ref 1–3)
LYMPHOCYTES NFR BLD AUTO: 27.3 % (ref 21–51)
MCH RBC QN AUTO: 28.8 PG (ref 27–33)
MCHC RBC AUTO-ENTMCNC: 31.3 G/DL (ref 33–37)
MCV RBC AUTO: 92.1 FL (ref 80–94)
MONOCYTES # BLD AUTO: 0.38 10*3/MM3 (ref 0.1–0.9)
MONOCYTES NFR BLD AUTO: 7.3 % (ref 0–10)
MYOGLOBIN SERPL-MCNC: 39 NG/ML (ref 0–109)
NEUTROPHILS # BLD AUTO: 3.19 10*3/MM3 (ref 1.4–6.5)
NEUTROPHILS NFR BLD AUTO: 61.1 % (ref 30–70)
NITRITE UR QL STRIP: NEGATIVE
OSMOLALITY SERPL CALC.SUM OF ELEC: 288.6 MOSM/KG (ref 273–305)
PH UR STRIP.AUTO: 6 [PH] (ref 5–8)
PLATELET # BLD AUTO: 174 10*3/MM3 (ref 130–400)
PMV BLD AUTO: 10.3 FL (ref 6–10)
POTASSIUM BLD-SCNC: 4.2 MMOL/L (ref 3.5–5.3)
PROT SERPL-MCNC: 7.5 G/DL (ref 6–8)
PROT UR QL STRIP: ABNORMAL
RBC # BLD AUTO: 4.03 10*6/MM3 (ref 4.2–5.4)
RBC # UR: ABNORMAL /HPF
REF LAB TEST METHOD: ABNORMAL
SODIUM BLD-SCNC: 144 MMOL/L (ref 135–153)
SP GR UR STRIP: 1.01 (ref 1–1.03)
SQUAMOUS #/AREA URNS HPF: ABNORMAL /HPF
TROPONIN I SERPL-MCNC: <0.006 NG/ML
UROBILINOGEN UR QL STRIP: ABNORMAL
WBC NRBC COR # BLD: 5.21 10*3/MM3 (ref 4.5–12.5)
WBC UR QL AUTO: ABNORMAL /HPF

## 2017-05-15 PROCEDURE — 85025 COMPLETE CBC W/AUTO DIFF WBC: CPT | Performed by: EMERGENCY MEDICINE

## 2017-05-15 PROCEDURE — 80053 COMPREHEN METABOLIC PANEL: CPT | Performed by: EMERGENCY MEDICINE

## 2017-05-15 PROCEDURE — 93005 ELECTROCARDIOGRAM TRACING: CPT | Performed by: EMERGENCY MEDICINE

## 2017-05-15 PROCEDURE — 86140 C-REACTIVE PROTEIN: CPT | Performed by: EMERGENCY MEDICINE

## 2017-05-15 PROCEDURE — 25010000002 LORAZEPAM PER 2 MG: Performed by: EMERGENCY MEDICINE

## 2017-05-15 PROCEDURE — 96374 THER/PROPH/DIAG INJ IV PUSH: CPT

## 2017-05-15 PROCEDURE — 82550 ASSAY OF CK (CPK): CPT | Performed by: EMERGENCY MEDICINE

## 2017-05-15 PROCEDURE — 82553 CREATINE MB FRACTION: CPT | Performed by: EMERGENCY MEDICINE

## 2017-05-15 PROCEDURE — 99284 EMERGENCY DEPT VISIT MOD MDM: CPT

## 2017-05-15 PROCEDURE — 71010 XR CHEST 1 VW: CPT | Performed by: RADIOLOGY

## 2017-05-15 PROCEDURE — 70450 CT HEAD/BRAIN W/O DYE: CPT

## 2017-05-15 PROCEDURE — 96375 TX/PRO/DX INJ NEW DRUG ADDON: CPT

## 2017-05-15 PROCEDURE — 93010 ELECTROCARDIOGRAM REPORT: CPT | Performed by: INTERNAL MEDICINE

## 2017-05-15 PROCEDURE — 84484 ASSAY OF TROPONIN QUANT: CPT | Performed by: EMERGENCY MEDICINE

## 2017-05-15 PROCEDURE — 83874 ASSAY OF MYOGLOBIN: CPT | Performed by: EMERGENCY MEDICINE

## 2017-05-15 PROCEDURE — 81001 URINALYSIS AUTO W/SCOPE: CPT | Performed by: EMERGENCY MEDICINE

## 2017-05-15 PROCEDURE — 70450 CT HEAD/BRAIN W/O DYE: CPT | Performed by: RADIOLOGY

## 2017-05-15 PROCEDURE — 96361 HYDRATE IV INFUSION ADD-ON: CPT

## 2017-05-15 PROCEDURE — 71010 HC CHEST PA OR AP: CPT

## 2017-05-15 RX ORDER — LORAZEPAM 2 MG/ML
1 INJECTION INTRAMUSCULAR ONCE
Status: COMPLETED | OUTPATIENT
Start: 2017-05-15 | End: 2017-05-15

## 2017-05-15 RX ORDER — OXYCODONE AND ACETAMINOPHEN 10; 325 MG/1; MG/1
1 TABLET ORAL ONCE
Status: COMPLETED | OUTPATIENT
Start: 2017-05-15 | End: 2017-05-15

## 2017-05-15 RX ORDER — SODIUM CHLORIDE 0.9 % (FLUSH) 0.9 %
10 SYRINGE (ML) INJECTION AS NEEDED
Status: DISCONTINUED | OUTPATIENT
Start: 2017-05-15 | End: 2017-05-15 | Stop reason: HOSPADM

## 2017-05-15 RX ORDER — LABETALOL HYDROCHLORIDE 5 MG/ML
10 INJECTION, SOLUTION INTRAVENOUS ONCE
Status: COMPLETED | OUTPATIENT
Start: 2017-05-15 | End: 2017-05-15

## 2017-05-15 RX ORDER — SODIUM CHLORIDE 9 MG/ML
125 INJECTION, SOLUTION INTRAVENOUS CONTINUOUS
Status: DISCONTINUED | OUTPATIENT
Start: 2017-05-15 | End: 2017-05-15 | Stop reason: HOSPADM

## 2017-05-15 RX ADMIN — SODIUM CHLORIDE 125 ML/HR: 9 INJECTION, SOLUTION INTRAVENOUS at 19:30

## 2017-05-15 RX ADMIN — LABETALOL HYDROCHLORIDE 10 MG: 5 INJECTION, SOLUTION INTRAVENOUS at 19:30

## 2017-05-15 RX ADMIN — LORAZEPAM 1 MG: 2 INJECTION INTRAMUSCULAR; INTRAVENOUS at 18:19

## 2017-05-15 RX ADMIN — SODIUM CHLORIDE 500 ML: 9 INJECTION, SOLUTION INTRAVENOUS at 18:21

## 2017-05-15 RX ADMIN — OXYCODONE HYDROCHLORIDE AND ACETAMINOPHEN 1 TABLET: 10; 325 TABLET ORAL at 18:20

## 2017-05-23 ENCOUNTER — HOSPITAL ENCOUNTER (OUTPATIENT)
Dept: GENERAL RADIOLOGY | Facility: HOSPITAL | Age: 57
Discharge: HOME | End: 2017-05-23
Attending: FAMILY MEDICINE

## 2017-05-23 ENCOUNTER — TRANSCRIBE ORDERS (OUTPATIENT)
Dept: INFUSION THERAPY | Facility: HOSPITAL | Age: 57
End: 2017-05-23

## 2017-05-23 ENCOUNTER — HOSPITAL ENCOUNTER (OUTPATIENT)
Dept: INFUSION THERAPY | Facility: HOSPITAL | Age: 57
Discharge: HOME | End: 2017-05-23
Attending: FAMILY MEDICINE | Admitting: FAMILY MEDICINE

## 2017-05-23 VITALS
HEART RATE: 70 BPM | DIASTOLIC BLOOD PRESSURE: 88 MMHG | TEMPERATURE: 98.5 F | RESPIRATION RATE: 18 BRPM | SYSTOLIC BLOOD PRESSURE: 154 MMHG

## 2017-05-23 DIAGNOSIS — L97.509 DIABETIC FOOT ULCER ASSOCIATED WITH DIABETES MELLITUS DUE TO UNDERLYING CONDITION, UNSPECIFIED LATERALITY: Primary | ICD-10-CM

## 2017-05-23 DIAGNOSIS — L03.119 CELLULITIS AND ABSCESS OF FOOT: ICD-10-CM

## 2017-05-23 DIAGNOSIS — E08.621 DIABETIC FOOT ULCER ASSOCIATED WITH DIABETES MELLITUS DUE TO UNDERLYING CONDITION, UNSPECIFIED LATERALITY: Primary | ICD-10-CM

## 2017-05-23 DIAGNOSIS — M86.9 OSTEOMYELITIS OF ANKLE OR FOOT: Primary | ICD-10-CM

## 2017-05-23 DIAGNOSIS — M86.9 OSTEOMYELITIS OF ANKLE OR FOOT: ICD-10-CM

## 2017-05-23 DIAGNOSIS — L97.529 TYPE 1 DIABETES MELLITUS WITH LEFT DIABETIC FOOT ULCER (HCC): Primary | ICD-10-CM

## 2017-05-23 DIAGNOSIS — E10.621 TYPE 1 DIABETES MELLITUS WITH LEFT DIABETIC FOOT ULCER (HCC): Primary | ICD-10-CM

## 2017-05-23 DIAGNOSIS — L02.619 CELLULITIS AND ABSCESS OF FOOT: ICD-10-CM

## 2017-05-23 PROCEDURE — 71010 HC CHEST PA OR AP: CPT

## 2017-05-23 PROCEDURE — G0463 HOSPITAL OUTPT CLINIC VISIT: HCPCS

## 2017-05-23 PROCEDURE — 71010 XR CHEST 1 VW: CPT | Performed by: RADIOLOGY

## 2017-05-24 ENCOUNTER — HOSPITAL ENCOUNTER (OUTPATIENT)
Dept: INFUSION THERAPY | Facility: HOSPITAL | Age: 57
Discharge: HOME | End: 2017-05-24
Admitting: NURSE PRACTITIONER

## 2017-05-24 VITALS
TEMPERATURE: 98.7 F | SYSTOLIC BLOOD PRESSURE: 190 MMHG | RESPIRATION RATE: 20 BRPM | HEART RATE: 70 BPM | DIASTOLIC BLOOD PRESSURE: 108 MMHG

## 2017-05-24 DIAGNOSIS — L97.509 DIABETIC FOOT ULCER ASSOCIATED WITH DIABETES MELLITUS DUE TO UNDERLYING CONDITION, UNSPECIFIED LATERALITY: ICD-10-CM

## 2017-05-24 DIAGNOSIS — E08.621 DIABETIC FOOT ULCER ASSOCIATED WITH DIABETES MELLITUS DUE TO UNDERLYING CONDITION, UNSPECIFIED LATERALITY: ICD-10-CM

## 2017-05-24 DIAGNOSIS — M86.9 OSTEOMYELITIS OF ANKLE OR FOOT: ICD-10-CM

## 2017-05-24 RX ORDER — LOSARTAN POTASSIUM 100 MG/1
100 TABLET ORAL DAILY
Status: ON HOLD | COMMUNITY
End: 2018-11-23

## 2017-05-25 ENCOUNTER — HOSPITAL ENCOUNTER (INPATIENT)
Facility: HOSPITAL | Age: 57
LOS: 5 days | Discharge: HOME HEALTH | End: 2017-05-30
Attending: FAMILY MEDICINE | Admitting: FAMILY MEDICINE

## 2017-05-25 DIAGNOSIS — L03.119 CELLULITIS AND ABSCESS OF FOOT: Primary | ICD-10-CM

## 2017-05-25 DIAGNOSIS — L02.619 CELLULITIS AND ABSCESS OF FOOT: Primary | ICD-10-CM

## 2017-05-25 LAB
CRP SERPL-MCNC: <0.5 MG/DL (ref 0–0.99)
GLUCOSE BLDC GLUCOMTR-MCNC: 201 MG/DL (ref 70–130)

## 2017-05-25 PROCEDURE — 63710000001 INSULIN ASPART PER 5 UNITS: Performed by: FAMILY MEDICINE

## 2017-05-25 PROCEDURE — 63710000001 INSULIN DETEMIR PER 5 UNITS: Performed by: FAMILY MEDICINE

## 2017-05-25 PROCEDURE — 82962 GLUCOSE BLOOD TEST: CPT

## 2017-05-25 PROCEDURE — 25010000002 DAPTOMYCIN PER 1 MG: Performed by: FAMILY MEDICINE

## 2017-05-25 PROCEDURE — 86140 C-REACTIVE PROTEIN: CPT | Performed by: PODIATRIST

## 2017-05-25 PROCEDURE — 25010000002 HEPARIN (PORCINE) PER 1000 UNITS: Performed by: FAMILY MEDICINE

## 2017-05-25 RX ORDER — BUSPIRONE HYDROCHLORIDE 10 MG/1
10 TABLET ORAL EVERY 12 HOURS SCHEDULED
Status: DISCONTINUED | OUTPATIENT
Start: 2017-05-25 | End: 2017-05-30 | Stop reason: HOSPADM

## 2017-05-25 RX ORDER — DEXTROSE MONOHYDRATE 25 G/50ML
25 INJECTION, SOLUTION INTRAVENOUS
Status: DISCONTINUED | OUTPATIENT
Start: 2017-05-25 | End: 2017-05-30 | Stop reason: HOSPADM

## 2017-05-25 RX ORDER — ATENOLOL 50 MG/1
100 TABLET ORAL DAILY
Status: DISCONTINUED | OUTPATIENT
Start: 2017-05-26 | End: 2017-05-30 | Stop reason: HOSPADM

## 2017-05-25 RX ORDER — HEPARIN SODIUM 5000 [USP'U]/ML
5000 INJECTION, SOLUTION INTRAVENOUS; SUBCUTANEOUS EVERY 8 HOURS SCHEDULED
Status: DISCONTINUED | OUTPATIENT
Start: 2017-05-25 | End: 2017-05-26 | Stop reason: SDUPTHER

## 2017-05-25 RX ORDER — CETIRIZINE HYDROCHLORIDE 10 MG/1
10 TABLET ORAL DAILY
Status: DISCONTINUED | OUTPATIENT
Start: 2017-05-26 | End: 2017-05-27

## 2017-05-25 RX ORDER — TEMAZEPAM 15 MG/1
30 CAPSULE ORAL NIGHTLY PRN
Status: DISCONTINUED | OUTPATIENT
Start: 2017-05-25 | End: 2017-05-30 | Stop reason: HOSPADM

## 2017-05-25 RX ORDER — BUSPIRONE HYDROCHLORIDE 10 MG/1
10 TABLET ORAL 2 TIMES DAILY
Status: ON HOLD | COMMUNITY
End: 2018-11-23

## 2017-05-25 RX ORDER — ATENOLOL 100 MG/1
50 TABLET ORAL 2 TIMES DAILY
Status: ON HOLD | COMMUNITY
End: 2018-11-23

## 2017-05-25 RX ORDER — HYDROCODONE BITARTRATE AND ACETAMINOPHEN 10; 325 MG/1; MG/1
1 TABLET ORAL EVERY 6 HOURS PRN
Status: DISCONTINUED | OUTPATIENT
Start: 2017-05-25 | End: 2017-05-28

## 2017-05-25 RX ORDER — NICOTINE POLACRILEX 4 MG
15 LOZENGE BUCCAL
Status: DISCONTINUED | OUTPATIENT
Start: 2017-05-25 | End: 2017-05-30 | Stop reason: HOSPADM

## 2017-05-25 RX ORDER — HYDROXYZINE HYDROCHLORIDE 10 MG/1
10 TABLET, FILM COATED ORAL DAILY
Status: DISCONTINUED | OUTPATIENT
Start: 2017-05-26 | End: 2017-05-30 | Stop reason: HOSPADM

## 2017-05-25 RX ORDER — GABAPENTIN 400 MG/1
800 CAPSULE ORAL EVERY 8 HOURS SCHEDULED
Status: DISCONTINUED | OUTPATIENT
Start: 2017-05-25 | End: 2017-05-30 | Stop reason: HOSPADM

## 2017-05-25 RX ORDER — FLUOXETINE HYDROCHLORIDE 20 MG/1
20 CAPSULE ORAL DAILY
Status: DISCONTINUED | OUTPATIENT
Start: 2017-05-26 | End: 2017-05-30 | Stop reason: HOSPADM

## 2017-05-25 RX ORDER — LOSARTAN POTASSIUM 50 MG/1
100 TABLET ORAL DAILY
Status: DISCONTINUED | OUTPATIENT
Start: 2017-05-26 | End: 2017-05-30 | Stop reason: HOSPADM

## 2017-05-25 RX ORDER — TIZANIDINE 4 MG/1
4 TABLET ORAL 4 TIMES DAILY
Status: DISCONTINUED | OUTPATIENT
Start: 2017-05-25 | End: 2017-05-30 | Stop reason: HOSPADM

## 2017-05-25 RX ADMIN — GABAPENTIN 800 MG: 400 CAPSULE ORAL at 21:13

## 2017-05-25 RX ADMIN — HYDROCODONE BITARTRATE AND ACETAMINOPHEN 1 TABLET: 10; 325 TABLET ORAL at 21:13

## 2017-05-25 RX ADMIN — TIZANIDINE 4 MG: 4 TABLET ORAL at 21:14

## 2017-05-25 RX ADMIN — DAPTOMYCIN 400 MG: 500 INJECTION, POWDER, LYOPHILIZED, FOR SOLUTION INTRAVENOUS at 21:41

## 2017-05-25 RX ADMIN — INSULIN ASPART 2 UNITS: 100 INJECTION, SOLUTION INTRAVENOUS; SUBCUTANEOUS at 21:48

## 2017-05-25 RX ADMIN — INSULIN DETEMIR 22 UNITS: 100 INJECTION, SOLUTION SUBCUTANEOUS at 21:40

## 2017-05-25 RX ADMIN — BUSPIRONE HYDROCHLORIDE 10 MG: 10 TABLET ORAL at 21:13

## 2017-05-25 RX ADMIN — TEMAZEPAM 30 MG: 15 CAPSULE ORAL at 21:42

## 2017-05-25 RX ADMIN — HEPARIN SODIUM 5000 UNITS: 5000 INJECTION, SOLUTION INTRAVENOUS; SUBCUTANEOUS at 21:41

## 2017-05-26 ENCOUNTER — ANESTHESIA EVENT (OUTPATIENT)
Dept: PERIOP | Facility: HOSPITAL | Age: 57
End: 2017-05-26

## 2017-05-26 ENCOUNTER — ANESTHESIA (OUTPATIENT)
Dept: PERIOP | Facility: HOSPITAL | Age: 57
End: 2017-05-26

## 2017-05-26 PROBLEM — L02.619 CELLULITIS AND ABSCESS OF FOOT: Status: ACTIVE | Noted: 2017-05-26

## 2017-05-26 PROBLEM — L03.119 CELLULITIS AND ABSCESS OF FOOT: Status: ACTIVE | Noted: 2017-05-26

## 2017-05-26 LAB
GLUCOSE BLDC GLUCOMTR-MCNC: 145 MG/DL (ref 70–130)
GLUCOSE BLDC GLUCOMTR-MCNC: 146 MG/DL (ref 70–130)
GLUCOSE BLDC GLUCOMTR-MCNC: 149 MG/DL (ref 70–130)
GLUCOSE BLDC GLUCOMTR-MCNC: 271 MG/DL (ref 70–130)

## 2017-05-26 PROCEDURE — 25010000002 DAPTOMYCIN PER 1 MG: Performed by: FAMILY MEDICINE

## 2017-05-26 PROCEDURE — 25010000002 CEFTRIAXONE: Performed by: FAMILY MEDICINE

## 2017-05-26 PROCEDURE — 82962 GLUCOSE BLOOD TEST: CPT

## 2017-05-26 PROCEDURE — 87070 CULTURE OTHR SPECIMN AEROBIC: CPT | Performed by: PODIATRIST

## 2017-05-26 PROCEDURE — 94799 UNLISTED PULMONARY SVC/PX: CPT

## 2017-05-26 PROCEDURE — 25010000002 FENTANYL CITRATE (PF) 100 MCG/2ML SOLUTION: Performed by: NURSE ANESTHETIST, CERTIFIED REGISTERED

## 2017-05-26 PROCEDURE — 0HBNXZZ EXCISION OF LEFT FOOT SKIN, EXTERNAL APPROACH: ICD-10-PCS | Performed by: PODIATRIST

## 2017-05-26 PROCEDURE — 63710000001 INSULIN DETEMIR PER 5 UNITS: Performed by: FAMILY MEDICINE

## 2017-05-26 PROCEDURE — 63710000001 INSULIN ASPART PER 5 UNITS: Performed by: FAMILY MEDICINE

## 2017-05-26 PROCEDURE — 25010000002 MIDAZOLAM PER 1 MG: Performed by: NURSE ANESTHETIST, CERTIFIED REGISTERED

## 2017-05-26 PROCEDURE — 87205 SMEAR GRAM STAIN: CPT | Performed by: PODIATRIST

## 2017-05-26 PROCEDURE — 25010000002 ONDANSETRON PER 1 MG: Performed by: PODIATRIST

## 2017-05-26 RX ORDER — FENTANYL CITRATE 50 UG/ML
50 INJECTION, SOLUTION INTRAMUSCULAR; INTRAVENOUS
Status: DISCONTINUED | OUTPATIENT
Start: 2017-05-26 | End: 2017-05-26 | Stop reason: HOSPADM

## 2017-05-26 RX ORDER — HYDROCODONE BITARTRATE AND ACETAMINOPHEN 7.5; 325 MG/1; MG/1
1 TABLET ORAL EVERY 4 HOURS PRN
Status: DISCONTINUED | OUTPATIENT
Start: 2017-05-26 | End: 2017-05-28

## 2017-05-26 RX ORDER — PROPOFOL 10 MG/ML
VIAL (ML) INTRAVENOUS AS NEEDED
Status: DISCONTINUED | OUTPATIENT
Start: 2017-05-26 | End: 2017-05-26

## 2017-05-26 RX ORDER — MIDAZOLAM HYDROCHLORIDE 1 MG/ML
INJECTION INTRAMUSCULAR; INTRAVENOUS AS NEEDED
Status: DISCONTINUED | OUTPATIENT
Start: 2017-05-26 | End: 2017-05-26 | Stop reason: SURG

## 2017-05-26 RX ORDER — MAGNESIUM HYDROXIDE 1200 MG/15ML
LIQUID ORAL AS NEEDED
Status: DISCONTINUED | OUTPATIENT
Start: 2017-05-26 | End: 2017-05-26 | Stop reason: HOSPADM

## 2017-05-26 RX ORDER — IPRATROPIUM BROMIDE AND ALBUTEROL SULFATE 2.5; .5 MG/3ML; MG/3ML
3 SOLUTION RESPIRATORY (INHALATION) ONCE AS NEEDED
Status: DISCONTINUED | OUTPATIENT
Start: 2017-05-26 | End: 2017-05-26 | Stop reason: HOSPADM

## 2017-05-26 RX ORDER — NALOXONE HCL 0.4 MG/ML
0.4 VIAL (ML) INJECTION
Status: DISCONTINUED | OUTPATIENT
Start: 2017-05-26 | End: 2017-05-26

## 2017-05-26 RX ORDER — BUPIVACAINE HYDROCHLORIDE 2.5 MG/ML
INJECTION, SOLUTION EPIDURAL; INFILTRATION; INTRACAUDAL AS NEEDED
Status: DISCONTINUED | OUTPATIENT
Start: 2017-05-26 | End: 2017-05-26 | Stop reason: HOSPADM

## 2017-05-26 RX ORDER — SODIUM CHLORIDE, SODIUM LACTATE, POTASSIUM CHLORIDE, CALCIUM CHLORIDE 600; 310; 30; 20 MG/100ML; MG/100ML; MG/100ML; MG/100ML
125 INJECTION, SOLUTION INTRAVENOUS CONTINUOUS
Status: DISCONTINUED | OUTPATIENT
Start: 2017-05-26 | End: 2017-05-26

## 2017-05-26 RX ORDER — LIDOCAINE HYDROCHLORIDE 20 MG/ML
INJECTION, SOLUTION INFILTRATION; PERINEURAL AS NEEDED
Status: DISCONTINUED | OUTPATIENT
Start: 2017-05-26 | End: 2017-05-26 | Stop reason: SURG

## 2017-05-26 RX ORDER — MORPHINE SULFATE 2 MG/ML
1 INJECTION, SOLUTION INTRAMUSCULAR; INTRAVENOUS EVERY 4 HOURS PRN
Status: DISCONTINUED | OUTPATIENT
Start: 2017-05-26 | End: 2017-05-26

## 2017-05-26 RX ORDER — FENTANYL CITRATE 50 UG/ML
INJECTION, SOLUTION INTRAMUSCULAR; INTRAVENOUS AS NEEDED
Status: DISCONTINUED | OUTPATIENT
Start: 2017-05-26 | End: 2017-05-26 | Stop reason: SURG

## 2017-05-26 RX ORDER — HEPARIN SODIUM 5000 [USP'U]/ML
5000 INJECTION, SOLUTION INTRAVENOUS; SUBCUTANEOUS EVERY 8 HOURS SCHEDULED
Status: CANCELLED | OUTPATIENT
Start: 2017-05-26

## 2017-05-26 RX ORDER — ONDANSETRON 2 MG/ML
4 INJECTION INTRAMUSCULAR; INTRAVENOUS EVERY 6 HOURS PRN
Status: DISCONTINUED | OUTPATIENT
Start: 2017-05-26 | End: 2017-05-30 | Stop reason: HOSPADM

## 2017-05-26 RX ORDER — ONDANSETRON 2 MG/ML
4 INJECTION INTRAMUSCULAR; INTRAVENOUS ONCE AS NEEDED
Status: DISCONTINUED | OUTPATIENT
Start: 2017-05-26 | End: 2017-05-26 | Stop reason: HOSPADM

## 2017-05-26 RX ORDER — SODIUM CHLORIDE 9 MG/ML
100 INJECTION, SOLUTION INTRAVENOUS CONTINUOUS
Status: DISCONTINUED | OUTPATIENT
Start: 2017-05-26 | End: 2017-05-30 | Stop reason: HOSPADM

## 2017-05-26 RX ORDER — MEPERIDINE HYDROCHLORIDE 25 MG/ML
12.5 INJECTION INTRAMUSCULAR; INTRAVENOUS; SUBCUTANEOUS
Status: DISCONTINUED | OUTPATIENT
Start: 2017-05-26 | End: 2017-05-26 | Stop reason: HOSPADM

## 2017-05-26 RX ORDER — SODIUM CHLORIDE 0.9 % (FLUSH) 0.9 %
1-10 SYRINGE (ML) INJECTION AS NEEDED
Status: DISCONTINUED | OUTPATIENT
Start: 2017-05-26 | End: 2017-05-26 | Stop reason: HOSPADM

## 2017-05-26 RX ADMIN — SODIUM CHLORIDE 100 ML/HR: 9 INJECTION, SOLUTION INTRAVENOUS at 16:17

## 2017-05-26 RX ADMIN — BUSPIRONE HYDROCHLORIDE 10 MG: 10 TABLET ORAL at 20:57

## 2017-05-26 RX ADMIN — MIDAZOLAM HYDROCHLORIDE 2 MG: 1 INJECTION, SOLUTION INTRAMUSCULAR; INTRAVENOUS at 14:11

## 2017-05-26 RX ADMIN — ONDANSETRON 4 MG: 2 INJECTION, SOLUTION INTRAMUSCULAR; INTRAVENOUS at 16:17

## 2017-05-26 RX ADMIN — SODIUM CHLORIDE, POTASSIUM CHLORIDE, SODIUM LACTATE AND CALCIUM CHLORIDE: 600; 310; 30; 20 INJECTION, SOLUTION INTRAVENOUS at 14:11

## 2017-05-26 RX ADMIN — INSULIN ASPART 6 UNITS: 100 INJECTION, SOLUTION INTRAVENOUS; SUBCUTANEOUS at 21:12

## 2017-05-26 RX ADMIN — FENTANYL CITRATE 100 MCG: 50 INJECTION INTRAMUSCULAR; INTRAVENOUS at 14:11

## 2017-05-26 RX ADMIN — INSULIN DETEMIR 22 UNITS: 100 INJECTION, SOLUTION SUBCUTANEOUS at 20:59

## 2017-05-26 RX ADMIN — HYDROCODONE BITARTRATE AND ACETAMINOPHEN 1 TABLET: 10; 325 TABLET ORAL at 12:45

## 2017-05-26 RX ADMIN — DAPTOMYCIN 400 MG: 500 INJECTION, POWDER, LYOPHILIZED, FOR SOLUTION INTRAVENOUS at 20:55

## 2017-05-26 RX ADMIN — LIDOCAINE HYDROCHLORIDE 60 MG: 20 INJECTION, SOLUTION INFILTRATION; PERINEURAL at 14:13

## 2017-05-26 RX ADMIN — GABAPENTIN 800 MG: 400 CAPSULE ORAL at 06:03

## 2017-05-26 RX ADMIN — TIZANIDINE 4 MG: 4 TABLET ORAL at 17:31

## 2017-05-26 RX ADMIN — ATENOLOL 100 MG: 50 TABLET ORAL at 08:55

## 2017-05-26 RX ADMIN — HYDROCODONE BITARTRATE AND ACETAMINOPHEN 1 TABLET: 10; 325 TABLET ORAL at 06:22

## 2017-05-26 RX ADMIN — CEFTRIAXONE 2 G: 2 INJECTION, POWDER, FOR SOLUTION INTRAMUSCULAR; INTRAVENOUS at 08:56

## 2017-05-26 RX ADMIN — LOSARTAN POTASSIUM 100 MG: 50 TABLET, FILM COATED ORAL at 08:55

## 2017-05-27 LAB
ANION GAP SERPL CALCULATED.3IONS-SCNC: 3 MMOL/L (ref 3.6–11.2)
BASOPHILS # BLD AUTO: 0.02 10*3/MM3 (ref 0–0.3)
BASOPHILS NFR BLD AUTO: 0.4 % (ref 0–2)
BUN BLD-MCNC: 21 MG/DL (ref 7–21)
BUN/CREAT SERPL: 16.5 (ref 7–25)
CALCIUM SPEC-SCNC: 8.7 MG/DL (ref 7.7–10)
CHLORIDE SERPL-SCNC: 108 MMOL/L (ref 99–112)
CO2 SERPL-SCNC: 29 MMOL/L (ref 24.3–31.9)
CREAT BLD-MCNC: 1.27 MG/DL (ref 0.43–1.29)
DEPRECATED RDW RBC AUTO: 44.2 FL (ref 37–54)
EOSINOPHIL # BLD AUTO: 0.18 10*3/MM3 (ref 0–0.7)
EOSINOPHIL NFR BLD AUTO: 3.9 % (ref 0–5)
ERYTHROCYTE [DISTWIDTH] IN BLOOD BY AUTOMATED COUNT: 13.5 % (ref 11.5–14.5)
GFR SERPL CREATININE-BSD FRML MDRD: 43 ML/MIN/1.73
GLUCOSE BLD-MCNC: 283 MG/DL (ref 70–110)
GLUCOSE BLDC GLUCOMTR-MCNC: 174 MG/DL (ref 70–130)
GLUCOSE BLDC GLUCOMTR-MCNC: 222 MG/DL (ref 70–130)
GLUCOSE BLDC GLUCOMTR-MCNC: 225 MG/DL (ref 70–130)
GLUCOSE BLDC GLUCOMTR-MCNC: 275 MG/DL (ref 70–130)
HCT VFR BLD AUTO: 32.6 % (ref 37–47)
HGB BLD-MCNC: 10.2 G/DL (ref 12–16)
IMM GRANULOCYTES # BLD: 0.01 10*3/MM3 (ref 0–0.03)
IMM GRANULOCYTES NFR BLD: 0.2 % (ref 0–0.5)
LYMPHOCYTES # BLD AUTO: 1.3 10*3/MM3 (ref 1–3)
LYMPHOCYTES NFR BLD AUTO: 28.4 % (ref 21–51)
MCH RBC QN AUTO: 29.4 PG (ref 27–33)
MCHC RBC AUTO-ENTMCNC: 31.3 G/DL (ref 33–37)
MCV RBC AUTO: 93.9 FL (ref 80–94)
MONOCYTES # BLD AUTO: 0.31 10*3/MM3 (ref 0.1–0.9)
MONOCYTES NFR BLD AUTO: 6.8 % (ref 0–10)
NEUTROPHILS # BLD AUTO: 2.75 10*3/MM3 (ref 1.4–6.5)
NEUTROPHILS NFR BLD AUTO: 60.3 % (ref 30–70)
OSMOLALITY SERPL CALC.SUM OF ELEC: 292.6 MOSM/KG (ref 273–305)
PLATELET # BLD AUTO: 109 10*3/MM3 (ref 130–400)
PMV BLD AUTO: 10.7 FL (ref 6–10)
POTASSIUM BLD-SCNC: 4.5 MMOL/L (ref 3.5–5.3)
RBC # BLD AUTO: 3.47 10*6/MM3 (ref 4.2–5.4)
SODIUM BLD-SCNC: 140 MMOL/L (ref 135–153)
WBC NRBC COR # BLD: 4.57 10*3/MM3 (ref 4.5–12.5)

## 2017-05-27 PROCEDURE — 85025 COMPLETE CBC W/AUTO DIFF WBC: CPT | Performed by: PODIATRIST

## 2017-05-27 PROCEDURE — 63710000001 INSULIN ASPART PER 5 UNITS: Performed by: FAMILY MEDICINE

## 2017-05-27 PROCEDURE — 63710000001 INSULIN DETEMIR PER 5 UNITS: Performed by: FAMILY MEDICINE

## 2017-05-27 PROCEDURE — 82962 GLUCOSE BLOOD TEST: CPT

## 2017-05-27 PROCEDURE — 94799 UNLISTED PULMONARY SVC/PX: CPT

## 2017-05-27 PROCEDURE — 25010000002 ENOXAPARIN PER 10 MG: Performed by: PODIATRIST

## 2017-05-27 PROCEDURE — 25010000002 DAPTOMYCIN PER 1 MG: Performed by: FAMILY MEDICINE

## 2017-05-27 PROCEDURE — 80048 BASIC METABOLIC PNL TOTAL CA: CPT | Performed by: PODIATRIST

## 2017-05-27 PROCEDURE — 25010000002 CEFTRIAXONE: Performed by: FAMILY MEDICINE

## 2017-05-27 RX ORDER — CETIRIZINE HYDROCHLORIDE 10 MG/1
5 TABLET ORAL DAILY
Status: DISCONTINUED | OUTPATIENT
Start: 2017-05-28 | End: 2017-05-30 | Stop reason: HOSPADM

## 2017-05-27 RX ADMIN — SODIUM CHLORIDE 100 ML/HR: 9 INJECTION, SOLUTION INTRAVENOUS at 01:38

## 2017-05-27 RX ADMIN — SODIUM CHLORIDE 100 ML/HR: 9 INJECTION, SOLUTION INTRAVENOUS at 12:06

## 2017-05-27 RX ADMIN — BUSPIRONE HYDROCHLORIDE 10 MG: 10 TABLET ORAL at 08:49

## 2017-05-27 RX ADMIN — CEFTRIAXONE 2 G: 2 INJECTION, POWDER, FOR SOLUTION INTRAMUSCULAR; INTRAVENOUS at 08:41

## 2017-05-27 RX ADMIN — INSULIN DETEMIR 22 UNITS: 100 INJECTION, SOLUTION SUBCUTANEOUS at 08:41

## 2017-05-27 RX ADMIN — TIZANIDINE 4 MG: 4 TABLET ORAL at 12:06

## 2017-05-27 RX ADMIN — HYDROCODONE BITARTRATE AND ACETAMINOPHEN 1 TABLET: 10; 325 TABLET ORAL at 21:40

## 2017-05-27 RX ADMIN — DAPTOMYCIN 400 MG: 500 INJECTION, POWDER, LYOPHILIZED, FOR SOLUTION INTRAVENOUS at 21:27

## 2017-05-27 RX ADMIN — TIZANIDINE 4 MG: 4 TABLET ORAL at 17:37

## 2017-05-27 RX ADMIN — TEMAZEPAM 30 MG: 15 CAPSULE ORAL at 01:36

## 2017-05-27 RX ADMIN — LOSARTAN POTASSIUM 100 MG: 50 TABLET, FILM COATED ORAL at 08:49

## 2017-05-27 RX ADMIN — GABAPENTIN 800 MG: 400 CAPSULE ORAL at 05:58

## 2017-05-27 RX ADMIN — FLUOXETINE 20 MG: 20 CAPSULE ORAL at 08:49

## 2017-05-27 RX ADMIN — INSULIN ASPART 6 UNITS: 100 INJECTION, SOLUTION INTRAVENOUS; SUBCUTANEOUS at 17:37

## 2017-05-27 RX ADMIN — INSULIN ASPART 2 UNITS: 100 INJECTION, SOLUTION INTRAVENOUS; SUBCUTANEOUS at 08:37

## 2017-05-27 RX ADMIN — INSULIN DETEMIR 22 UNITS: 100 INJECTION, SOLUTION SUBCUTANEOUS at 21:41

## 2017-05-27 RX ADMIN — ENOXAPARIN SODIUM 40 MG: 40 INJECTION SUBCUTANEOUS at 08:49

## 2017-05-27 RX ADMIN — ATENOLOL 100 MG: 50 TABLET ORAL at 08:48

## 2017-05-27 RX ADMIN — HYDROCODONE BITARTRATE AND ACETAMINOPHEN 1 TABLET: 10; 325 TABLET ORAL at 14:35

## 2017-05-27 RX ADMIN — HYDROXYZINE HYDROCHLORIDE 10 MG: 10 TABLET ORAL at 08:49

## 2017-05-27 RX ADMIN — INSULIN ASPART 4 UNITS: 100 INJECTION, SOLUTION INTRAVENOUS; SUBCUTANEOUS at 12:06

## 2017-05-27 RX ADMIN — GABAPENTIN 800 MG: 400 CAPSULE ORAL at 21:24

## 2017-05-27 RX ADMIN — INSULIN ASPART 4 UNITS: 100 INJECTION, SOLUTION INTRAVENOUS; SUBCUTANEOUS at 21:42

## 2017-05-27 RX ADMIN — HYDROCODONE BITARTRATE AND ACETAMINOPHEN 1 TABLET: 10; 325 TABLET ORAL at 07:27

## 2017-05-27 RX ADMIN — GABAPENTIN 800 MG: 400 CAPSULE ORAL at 14:35

## 2017-05-27 RX ADMIN — CETIRIZINE HYDROCHLORIDE 10 MG: 10 TABLET ORAL at 08:48

## 2017-05-27 RX ADMIN — TIZANIDINE 4 MG: 4 TABLET ORAL at 08:49

## 2017-05-27 RX ADMIN — BUSPIRONE HYDROCHLORIDE 10 MG: 10 TABLET ORAL at 21:24

## 2017-05-27 RX ADMIN — HYDROCODONE BITARTRATE AND ACETAMINOPHEN 1 TABLET: 10; 325 TABLET ORAL at 00:29

## 2017-05-28 LAB
GLUCOSE BLDC GLUCOMTR-MCNC: 185 MG/DL (ref 70–130)
GLUCOSE BLDC GLUCOMTR-MCNC: 221 MG/DL (ref 70–130)
GLUCOSE BLDC GLUCOMTR-MCNC: 262 MG/DL (ref 70–130)
GLUCOSE BLDC GLUCOMTR-MCNC: 272 MG/DL (ref 70–130)

## 2017-05-28 PROCEDURE — 25010000002 CEFTRIAXONE: Performed by: FAMILY MEDICINE

## 2017-05-28 PROCEDURE — 63710000001 INSULIN ASPART PER 5 UNITS: Performed by: FAMILY MEDICINE

## 2017-05-28 PROCEDURE — 82962 GLUCOSE BLOOD TEST: CPT

## 2017-05-28 PROCEDURE — 25010000002 ENOXAPARIN PER 10 MG: Performed by: PODIATRIST

## 2017-05-28 PROCEDURE — 63710000001 INSULIN DETEMIR PER 5 UNITS: Performed by: FAMILY MEDICINE

## 2017-05-28 PROCEDURE — 25010000002 DAPTOMYCIN PER 1 MG: Performed by: FAMILY MEDICINE

## 2017-05-28 PROCEDURE — 94799 UNLISTED PULMONARY SVC/PX: CPT

## 2017-05-28 RX ORDER — SODIUM HYPOCHLORITE 1.25 MG/ML
SOLUTION TOPICAL 2 TIMES DAILY
Status: DISCONTINUED | OUTPATIENT
Start: 2017-05-28 | End: 2017-05-29

## 2017-05-28 RX ORDER — OXYCODONE HYDROCHLORIDE AND ACETAMINOPHEN 5; 325 MG/1; MG/1
2 TABLET ORAL EVERY 6 HOURS PRN
Status: DISCONTINUED | OUTPATIENT
Start: 2017-05-28 | End: 2017-05-30 | Stop reason: HOSPADM

## 2017-05-28 RX ADMIN — INSULIN DETEMIR 22 UNITS: 100 INJECTION, SOLUTION SUBCUTANEOUS at 08:34

## 2017-05-28 RX ADMIN — INSULIN ASPART 6 UNITS: 100 INJECTION, SOLUTION INTRAVENOUS; SUBCUTANEOUS at 12:04

## 2017-05-28 RX ADMIN — BUSPIRONE HYDROCHLORIDE 10 MG: 10 TABLET ORAL at 08:35

## 2017-05-28 RX ADMIN — INSULIN ASPART 6 UNITS: 100 INJECTION, SOLUTION INTRAVENOUS; SUBCUTANEOUS at 22:30

## 2017-05-28 RX ADMIN — CETIRIZINE HYDROCHLORIDE 5 MG: 10 TABLET ORAL at 08:35

## 2017-05-28 RX ADMIN — ENOXAPARIN SODIUM 40 MG: 40 INJECTION SUBCUTANEOUS at 08:34

## 2017-05-28 RX ADMIN — TIZANIDINE 4 MG: 4 TABLET ORAL at 12:07

## 2017-05-28 RX ADMIN — TIZANIDINE 4 MG: 4 TABLET ORAL at 22:02

## 2017-05-28 RX ADMIN — TIZANIDINE 4 MG: 4 TABLET ORAL at 08:34

## 2017-05-28 RX ADMIN — LOSARTAN POTASSIUM 100 MG: 50 TABLET, FILM COATED ORAL at 08:35

## 2017-05-28 RX ADMIN — FLUOXETINE 20 MG: 20 CAPSULE ORAL at 08:35

## 2017-05-28 RX ADMIN — GABAPENTIN 800 MG: 400 CAPSULE ORAL at 22:01

## 2017-05-28 RX ADMIN — TIZANIDINE 4 MG: 4 TABLET ORAL at 17:41

## 2017-05-28 RX ADMIN — BUSPIRONE HYDROCHLORIDE 10 MG: 10 TABLET ORAL at 22:00

## 2017-05-28 RX ADMIN — HYDROCODONE BITARTRATE AND ACETAMINOPHEN 1 TABLET: 10; 325 TABLET ORAL at 05:42

## 2017-05-28 RX ADMIN — INSULIN ASPART 4 UNITS: 100 INJECTION, SOLUTION INTRAVENOUS; SUBCUTANEOUS at 17:41

## 2017-05-28 RX ADMIN — GABAPENTIN 800 MG: 400 CAPSULE ORAL at 05:42

## 2017-05-28 RX ADMIN — INSULIN ASPART 2 UNITS: 100 INJECTION, SOLUTION INTRAVENOUS; SUBCUTANEOUS at 08:34

## 2017-05-28 RX ADMIN — ATENOLOL 100 MG: 50 TABLET ORAL at 08:35

## 2017-05-28 RX ADMIN — CEFTRIAXONE 2 G: 2 INJECTION, POWDER, FOR SOLUTION INTRAMUSCULAR; INTRAVENOUS at 08:38

## 2017-05-28 RX ADMIN — OXYCODONE HYDROCHLORIDE AND ACETAMINOPHEN 2 TABLET: 5; 325 TABLET ORAL at 17:41

## 2017-05-28 RX ADMIN — DAPTOMYCIN 400 MG: 500 INJECTION, POWDER, LYOPHILIZED, FOR SOLUTION INTRAVENOUS at 22:00

## 2017-05-28 RX ADMIN — GABAPENTIN 800 MG: 400 CAPSULE ORAL at 13:55

## 2017-05-28 RX ADMIN — SODIUM HYPOCHLORITE: 1.25 SOLUTION TOPICAL at 17:42

## 2017-05-28 RX ADMIN — HYDROXYZINE HYDROCHLORIDE 10 MG: 10 TABLET ORAL at 08:35

## 2017-05-28 RX ADMIN — OXYCODONE HYDROCHLORIDE AND ACETAMINOPHEN 2 TABLET: 5; 325 TABLET ORAL at 12:04

## 2017-05-28 RX ADMIN — INSULIN DETEMIR 22 UNITS: 100 INJECTION, SOLUTION SUBCUTANEOUS at 22:32

## 2017-05-29 LAB
BACTERIA SPEC AEROBE CULT: NORMAL
GLUCOSE BLDC GLUCOMTR-MCNC: 119 MG/DL (ref 70–130)
GLUCOSE BLDC GLUCOMTR-MCNC: 179 MG/DL (ref 70–130)
GLUCOSE BLDC GLUCOMTR-MCNC: 259 MG/DL (ref 70–130)
GLUCOSE BLDC GLUCOMTR-MCNC: 72 MG/DL (ref 70–130)
GRAM STN SPEC: NORMAL

## 2017-05-29 PROCEDURE — 82962 GLUCOSE BLOOD TEST: CPT

## 2017-05-29 PROCEDURE — 25010000002 ENOXAPARIN PER 10 MG: Performed by: PODIATRIST

## 2017-05-29 PROCEDURE — 94799 UNLISTED PULMONARY SVC/PX: CPT

## 2017-05-29 PROCEDURE — 63710000001 INSULIN DETEMIR PER 5 UNITS: Performed by: FAMILY MEDICINE

## 2017-05-29 PROCEDURE — 25010000002 CEFTRIAXONE: Performed by: FAMILY MEDICINE

## 2017-05-29 PROCEDURE — 63710000001 INSULIN ASPART PER 5 UNITS: Performed by: FAMILY MEDICINE

## 2017-05-29 PROCEDURE — 25010000002 DAPTOMYCIN PER 1 MG: Performed by: FAMILY MEDICINE

## 2017-05-29 RX ORDER — SODIUM HYPOCHLORITE 1.25 MG/ML
SOLUTION TOPICAL EVERY 12 HOURS
Status: DISCONTINUED | OUTPATIENT
Start: 2017-05-29 | End: 2017-05-30 | Stop reason: HOSPADM

## 2017-05-29 RX ADMIN — TIZANIDINE 4 MG: 4 TABLET ORAL at 08:38

## 2017-05-29 RX ADMIN — INSULIN ASPART 2 UNITS: 100 INJECTION, SOLUTION INTRAVENOUS; SUBCUTANEOUS at 08:36

## 2017-05-29 RX ADMIN — GABAPENTIN 800 MG: 400 CAPSULE ORAL at 21:44

## 2017-05-29 RX ADMIN — HYDROXYZINE HYDROCHLORIDE 10 MG: 10 TABLET ORAL at 08:38

## 2017-05-29 RX ADMIN — OXYCODONE HYDROCHLORIDE AND ACETAMINOPHEN 2 TABLET: 5; 325 TABLET ORAL at 03:56

## 2017-05-29 RX ADMIN — SODIUM HYPOCHLORITE: 1.25 SOLUTION TOPICAL at 21:44

## 2017-05-29 RX ADMIN — ENOXAPARIN SODIUM 40 MG: 40 INJECTION SUBCUTANEOUS at 08:35

## 2017-05-29 RX ADMIN — ATENOLOL 100 MG: 50 TABLET ORAL at 08:35

## 2017-05-29 RX ADMIN — INSULIN ASPART 6 UNITS: 100 INJECTION, SOLUTION INTRAVENOUS; SUBCUTANEOUS at 13:48

## 2017-05-29 RX ADMIN — TIZANIDINE 4 MG: 4 TABLET ORAL at 13:52

## 2017-05-29 RX ADMIN — BUSPIRONE HYDROCHLORIDE 10 MG: 10 TABLET ORAL at 08:37

## 2017-05-29 RX ADMIN — TEMAZEPAM 30 MG: 15 CAPSULE ORAL at 21:58

## 2017-05-29 RX ADMIN — BUSPIRONE HYDROCHLORIDE 10 MG: 10 TABLET ORAL at 21:44

## 2017-05-29 RX ADMIN — GABAPENTIN 800 MG: 400 CAPSULE ORAL at 05:14

## 2017-05-29 RX ADMIN — INSULIN DETEMIR 22 UNITS: 100 INJECTION, SOLUTION SUBCUTANEOUS at 21:00

## 2017-05-29 RX ADMIN — SODIUM HYPOCHLORITE: 1.25 SOLUTION TOPICAL at 08:34

## 2017-05-29 RX ADMIN — INSULIN DETEMIR 22 UNITS: 100 INJECTION, SOLUTION SUBCUTANEOUS at 09:58

## 2017-05-29 RX ADMIN — CEFTRIAXONE 2 G: 2 INJECTION, POWDER, FOR SOLUTION INTRAMUSCULAR; INTRAVENOUS at 08:34

## 2017-05-29 RX ADMIN — GABAPENTIN 800 MG: 400 CAPSULE ORAL at 15:10

## 2017-05-29 RX ADMIN — CETIRIZINE HYDROCHLORIDE 5 MG: 10 TABLET ORAL at 08:36

## 2017-05-29 RX ADMIN — OXYCODONE HYDROCHLORIDE AND ACETAMINOPHEN 2 TABLET: 5; 325 TABLET ORAL at 12:07

## 2017-05-29 RX ADMIN — DAPTOMYCIN 400 MG: 500 INJECTION, POWDER, LYOPHILIZED, FOR SOLUTION INTRAVENOUS at 21:58

## 2017-05-29 RX ADMIN — TIZANIDINE 4 MG: 4 TABLET ORAL at 21:44

## 2017-05-29 RX ADMIN — OXYCODONE HYDROCHLORIDE AND ACETAMINOPHEN 2 TABLET: 5; 325 TABLET ORAL at 18:35

## 2017-05-29 RX ADMIN — LOSARTAN POTASSIUM 100 MG: 50 TABLET, FILM COATED ORAL at 08:37

## 2017-05-29 RX ADMIN — TIZANIDINE 4 MG: 4 TABLET ORAL at 17:29

## 2017-05-29 RX ADMIN — FLUOXETINE 20 MG: 20 CAPSULE ORAL at 08:38

## 2017-05-30 VITALS
WEIGHT: 150 LBS | HEART RATE: 63 BPM | SYSTOLIC BLOOD PRESSURE: 119 MMHG | OXYGEN SATURATION: 88 % | HEIGHT: 65 IN | BODY MASS INDEX: 24.99 KG/M2 | RESPIRATION RATE: 18 BRPM | TEMPERATURE: 97.9 F | DIASTOLIC BLOOD PRESSURE: 53 MMHG

## 2017-05-30 LAB — GLUCOSE BLDC GLUCOMTR-MCNC: 157 MG/DL (ref 70–130)

## 2017-05-30 PROCEDURE — 94799 UNLISTED PULMONARY SVC/PX: CPT

## 2017-05-30 PROCEDURE — 63710000001 INSULIN ASPART PER 5 UNITS: Performed by: FAMILY MEDICINE

## 2017-05-30 PROCEDURE — 25010000002 ENOXAPARIN PER 10 MG: Performed by: PODIATRIST

## 2017-05-30 PROCEDURE — 82962 GLUCOSE BLOOD TEST: CPT

## 2017-05-30 PROCEDURE — 63710000001 INSULIN DETEMIR PER 5 UNITS: Performed by: FAMILY MEDICINE

## 2017-05-30 PROCEDURE — 25010000002 CEFTRIAXONE: Performed by: FAMILY MEDICINE

## 2017-05-30 RX ORDER — SODIUM HYPOCHLORITE 1.25 MG/ML
SOLUTION TOPICAL
Qty: 1 BOTTLE | Refills: 0 | Status: SHIPPED | OUTPATIENT
Start: 2017-05-30 | End: 2017-06-21

## 2017-05-30 RX ORDER — OXYCODONE AND ACETAMINOPHEN 10; 325 MG/1; MG/1
1 TABLET ORAL EVERY 6 HOURS PRN
Qty: 60 TABLET | Refills: 0 | Status: SHIPPED | OUTPATIENT
Start: 2017-05-30 | End: 2017-06-13 | Stop reason: SDUPTHER

## 2017-05-30 RX ADMIN — LOSARTAN POTASSIUM 100 MG: 50 TABLET, FILM COATED ORAL at 09:31

## 2017-05-30 RX ADMIN — ENOXAPARIN SODIUM 40 MG: 40 INJECTION SUBCUTANEOUS at 09:31

## 2017-05-30 RX ADMIN — CETIRIZINE HYDROCHLORIDE 5 MG: 10 TABLET ORAL at 09:31

## 2017-05-30 RX ADMIN — INSULIN DETEMIR 22 UNITS: 100 INJECTION, SOLUTION SUBCUTANEOUS at 09:32

## 2017-05-30 RX ADMIN — FLUOXETINE 20 MG: 20 CAPSULE ORAL at 09:31

## 2017-05-30 RX ADMIN — BUSPIRONE HYDROCHLORIDE 10 MG: 10 TABLET ORAL at 09:31

## 2017-05-30 RX ADMIN — CEFTRIAXONE 2 G: 2 INJECTION, POWDER, FOR SOLUTION INTRAMUSCULAR; INTRAVENOUS at 09:32

## 2017-05-30 RX ADMIN — ATENOLOL 100 MG: 50 TABLET ORAL at 09:30

## 2017-05-30 RX ADMIN — GABAPENTIN 800 MG: 400 CAPSULE ORAL at 06:09

## 2017-05-30 RX ADMIN — TIZANIDINE 4 MG: 4 TABLET ORAL at 11:54

## 2017-05-30 RX ADMIN — OXYCODONE HYDROCHLORIDE AND ACETAMINOPHEN 2 TABLET: 5; 325 TABLET ORAL at 06:09

## 2017-05-30 RX ADMIN — SODIUM HYPOCHLORITE: 1.25 SOLUTION TOPICAL at 09:33

## 2017-05-30 RX ADMIN — INSULIN ASPART 2 UNITS: 100 INJECTION, SOLUTION INTRAVENOUS; SUBCUTANEOUS at 09:33

## 2017-05-30 RX ADMIN — TIZANIDINE 4 MG: 4 TABLET ORAL at 09:31

## 2017-05-30 RX ADMIN — HYDROXYZINE HYDROCHLORIDE 10 MG: 10 TABLET ORAL at 09:30

## 2017-05-30 RX ADMIN — OXYCODONE HYDROCHLORIDE AND ACETAMINOPHEN 2 TABLET: 5; 325 TABLET ORAL at 00:13

## 2017-06-08 NOTE — OP NOTE
INCISION AND DRAINAGE LOWER EXTREMITY  Procedure Note    Reny Elena  5/25/2017 - 5/26/2017      Pre-op Diagnosis:   Cellulitis and abscess of foot [L03.119, L02.619]    Post-op Diagnosis:     Post-Op Diagnosis Codes:     * Cellulitis and abscess of foot [L03.119, L02.619]      Procedure(s):  Irrigation and Debridment abcess diabetic wound left foot with deep culture    Surgeon(s):  Basilio Morris MD    Procedure: Incision and Drainage Abcess with Soft tissue Non Excisional Debridement via Currettage Left foot    Anesthesia: General    Staff:   Circulator: Rosie Spears RN  Scrub Person: Christal Monsivais  Assistant: Simon Viera    HEMOSTASIS:Pneumatic Tourniquet 250mmhg    Estimated Blood Loss: *No blood loss documented*    MATERIALS: None     Specimens:                  Order Name Source Comment Collection Info Order Time   CULTURE, ROUTINE Foot, Left  Collected By: Basilio Morris MD 5/26/2017  2:24 PM       Grafts/Implants:None     Injectables: 10cc Marcaine Plain       Drains:           Findings:None     Complications: None      Incision and Drainage Abcess with Soft tissue Non Excisional Debridement via Currettage Left foot  Under IV sedation, the patient was brought to OR and placed operative table supine position. Anesthesia administered successfully.  The limb was scrubbed prepped and draped usual sterile manner. Pneumatic ankle tourniquet elevated after eschmarch esanguination. Attention directed to the left foot where open wound plantar first MTPJ was debrided of hyperkeratotic tissue 10 blade. The open wound was probed through sinus tract leading to the first interspace abcess void where theabcess fluid collection correlating with the MRI abcess location was released/decompressed with swab culture for C/S and gram stain. Pulse lavage irrigation with 2 liters vancomycin solution with currettage of the soft tissue void perimeter to lightly bleeding. Dakins 1/4 string gauze packing of the wound with  bulky dressing, light ace wrap. Tourniquet released 250mmhg. Patient tolerated procedure well transferred from OR to PACU with vital signs stable.     Basilio Morris MD     Date: 6/8/2017  Time: 12:23 PM

## 2017-06-12 NOTE — DISCHARGE SUMMARY
DATE OF ADMISSION:  05/25/2017  DATE OF DISCHARGE:   05/30/2017    ADMITTING DIAGNOSIS:  Diabetic foot abscess, left foot.    DISCHARGE DIAGNOSIS:  Diabetic foot abscess, left foot, status post incision and drainage.    HISTORY/HOSPITAL COURSE:  The patient is a 57-year-old admitted with abscess of her left foot secondary to diabetic foot abscess. The patient underwent the 1st metatarsal phalangeal space for incision and drainage and undergoing packing.  She has been on IV antibiotics during his course of stay and with the assistance of Infectious Disease, the patient was discharged on daptomycin and Rocephin and will continue this for 4 weeks. The patient is to follow back up with Dr. Morirs.       D: PARAMJIT 06/12/2017 13:59:14 ET  T: tf / 06/12/2017 15:20:55 ET  Revision Count: 0  Job ID: [id number]  [id number]  cc:        Dictator Signature:___________________________     Yandel Paulson MD

## 2017-06-19 ENCOUNTER — LAB REQUISITION (OUTPATIENT)
Dept: LAB | Facility: HOSPITAL | Age: 57
End: 2017-06-19

## 2017-06-19 DIAGNOSIS — M86.9 OSTEOMYELITIS (HCC): ICD-10-CM

## 2017-06-19 LAB
BASOPHILS # BLD AUTO: 0.03 10*3/MM3 (ref 0–0.3)
BASOPHILS NFR BLD AUTO: 0.7 % (ref 0–2)
CK SERPL-CCNC: 80 U/L (ref 24–173)
CRP SERPL-MCNC: <0.5 MG/DL (ref 0–0.99)
DEPRECATED RDW RBC AUTO: 40.7 FL (ref 37–54)
EOSINOPHIL # BLD AUTO: 0.4 10*3/MM3 (ref 0–0.7)
EOSINOPHIL NFR BLD AUTO: 9.7 % (ref 0–5)
ERYTHROCYTE [DISTWIDTH] IN BLOOD BY AUTOMATED COUNT: 13 % (ref 11.5–14.5)
HCT VFR BLD AUTO: 38.4 % (ref 37–47)
HGB BLD-MCNC: 12.6 G/DL (ref 12–16)
IMM GRANULOCYTES # BLD: 0 10*3/MM3 (ref 0–0.03)
IMM GRANULOCYTES NFR BLD: 0 % (ref 0–0.5)
LYMPHOCYTES # BLD AUTO: 1.2 10*3/MM3 (ref 1–3)
LYMPHOCYTES NFR BLD AUTO: 29.1 % (ref 21–51)
MCH RBC QN AUTO: 28.8 PG (ref 27–33)
MCHC RBC AUTO-ENTMCNC: 32.8 G/DL (ref 33–37)
MCV RBC AUTO: 87.7 FL (ref 80–94)
MONOCYTES # BLD AUTO: 0.31 10*3/MM3 (ref 0.1–0.9)
MONOCYTES NFR BLD AUTO: 7.5 % (ref 0–10)
NEUTROPHILS # BLD AUTO: 2.19 10*3/MM3 (ref 1.4–6.5)
NEUTROPHILS NFR BLD AUTO: 53 % (ref 30–70)
PLATELET # BLD AUTO: 143 10*3/MM3 (ref 130–400)
PMV BLD AUTO: 10.8 FL (ref 6–10)
RBC # BLD AUTO: 4.38 10*6/MM3 (ref 4.2–5.4)
WBC NRBC COR # BLD: 4.13 10*3/MM3 (ref 4.5–12.5)

## 2017-06-19 PROCEDURE — 86140 C-REACTIVE PROTEIN: CPT | Performed by: PODIATRIST

## 2017-06-19 PROCEDURE — 82550 ASSAY OF CK (CPK): CPT | Performed by: PODIATRIST

## 2017-06-19 PROCEDURE — 85025 COMPLETE CBC W/AUTO DIFF WBC: CPT | Performed by: PODIATRIST

## 2017-06-20 ENCOUNTER — HOSPITAL ENCOUNTER (OUTPATIENT)
Facility: HOSPITAL | Age: 57
Setting detail: OBSERVATION
Discharge: HOME HEALTH | End: 2017-06-23
Attending: EMERGENCY MEDICINE | Admitting: FAMILY MEDICINE

## 2017-06-20 DIAGNOSIS — E11.621 DIABETIC ULCER OF LEFT FOOT ASSOCIATED WITH TYPE 2 DIABETES MELLITUS (HCC): Primary | ICD-10-CM

## 2017-06-20 DIAGNOSIS — L97.529 DIABETIC ULCER OF LEFT FOOT ASSOCIATED WITH TYPE 2 DIABETES MELLITUS (HCC): Primary | ICD-10-CM

## 2017-06-20 LAB
BASOPHILS # BLD AUTO: 0.04 10*3/MM3 (ref 0–0.3)
BASOPHILS NFR BLD AUTO: 0.7 % (ref 0–2)
DEPRECATED RDW RBC AUTO: 40.7 FL (ref 37–54)
EOSINOPHIL # BLD AUTO: 0.34 10*3/MM3 (ref 0–0.7)
EOSINOPHIL NFR BLD AUTO: 6 % (ref 0–5)
ERYTHROCYTE [DISTWIDTH] IN BLOOD BY AUTOMATED COUNT: 13 % (ref 11.5–14.5)
HCT VFR BLD AUTO: 39.3 % (ref 37–47)
HGB BLD-MCNC: 13.1 G/DL (ref 12–16)
IMM GRANULOCYTES # BLD: 0 10*3/MM3 (ref 0–0.03)
IMM GRANULOCYTES NFR BLD: 0 % (ref 0–0.5)
LYMPHOCYTES # BLD AUTO: 1.82 10*3/MM3 (ref 1–3)
LYMPHOCYTES NFR BLD AUTO: 31.9 % (ref 21–51)
MCH RBC QN AUTO: 29 PG (ref 27–33)
MCHC RBC AUTO-ENTMCNC: 33.3 G/DL (ref 33–37)
MCV RBC AUTO: 87.1 FL (ref 80–94)
MONOCYTES # BLD AUTO: 0.42 10*3/MM3 (ref 0.1–0.9)
MONOCYTES NFR BLD AUTO: 7.4 % (ref 0–10)
NEUTROPHILS # BLD AUTO: 3.08 10*3/MM3 (ref 1.4–6.5)
NEUTROPHILS NFR BLD AUTO: 54 % (ref 30–70)
PLATELET # BLD AUTO: 149 10*3/MM3 (ref 130–400)
PMV BLD AUTO: 10.8 FL (ref 6–10)
RBC # BLD AUTO: 4.51 10*6/MM3 (ref 4.2–5.4)
WBC NRBC COR # BLD: 5.7 10*3/MM3 (ref 4.5–12.5)

## 2017-06-20 PROCEDURE — 85025 COMPLETE CBC W/AUTO DIFF WBC: CPT | Performed by: EMERGENCY MEDICINE

## 2017-06-20 PROCEDURE — 99284 EMERGENCY DEPT VISIT MOD MDM: CPT

## 2017-06-20 PROCEDURE — 80053 COMPREHEN METABOLIC PANEL: CPT | Performed by: EMERGENCY MEDICINE

## 2017-06-20 PROCEDURE — 86140 C-REACTIVE PROTEIN: CPT | Performed by: EMERGENCY MEDICINE

## 2017-06-20 PROCEDURE — 25010000002 MEPERIDINE PER 100 MG: Performed by: EMERGENCY MEDICINE

## 2017-06-20 RX ORDER — CLONIDINE HYDROCHLORIDE 0.1 MG/1
0.1 TABLET ORAL ONCE
Status: COMPLETED | OUTPATIENT
Start: 2017-06-20 | End: 2017-06-20

## 2017-06-20 RX ORDER — MEPERIDINE HYDROCHLORIDE 25 MG/ML
25 INJECTION INTRAMUSCULAR; INTRAVENOUS; SUBCUTANEOUS ONCE
Status: COMPLETED | OUTPATIENT
Start: 2017-06-20 | End: 2017-06-20

## 2017-06-20 RX ADMIN — MEPERIDINE HYDROCHLORIDE 25 MG: 25 INJECTION, SOLUTION INTRAMUSCULAR; INTRAVENOUS; SUBCUTANEOUS at 23:38

## 2017-06-20 RX ADMIN — CLONIDINE HYDROCHLORIDE 0.1 MG: 0.1 TABLET ORAL at 23:38

## 2017-06-21 PROBLEM — L97.509 DIABETIC FOOT ULCERS (HCC): Status: ACTIVE | Noted: 2017-06-21

## 2017-06-21 PROBLEM — E11.621 DIABETIC FOOT ULCERS: Status: ACTIVE | Noted: 2017-06-21

## 2017-06-21 LAB
ALBUMIN SERPL-MCNC: 4.3 G/DL (ref 3.5–5)
ALBUMIN/GLOB SERPL: 1.2 G/DL (ref 1.5–2.5)
ALP SERPL-CCNC: 111 U/L (ref 35–104)
ALT SERPL W P-5'-P-CCNC: 29 U/L (ref 10–36)
ANION GAP SERPL CALCULATED.3IONS-SCNC: 9.7 MMOL/L (ref 3.6–11.2)
AST SERPL-CCNC: 23 U/L (ref 10–30)
BILIRUB SERPL-MCNC: 0.3 MG/DL (ref 0.2–1.8)
BUN BLD-MCNC: 19 MG/DL (ref 7–21)
BUN/CREAT SERPL: 14.6 (ref 7–25)
CALCIUM SPEC-SCNC: 9.6 MG/DL (ref 7.7–10)
CHLORIDE SERPL-SCNC: 105 MMOL/L (ref 99–112)
CO2 SERPL-SCNC: 26.3 MMOL/L (ref 24.3–31.9)
CREAT BLD-MCNC: 1.3 MG/DL (ref 0.43–1.29)
CRP SERPL-MCNC: <0.5 MG/DL (ref 0–0.99)
GFR SERPL CREATININE-BSD FRML MDRD: 42 ML/MIN/1.73
GLOBULIN UR ELPH-MCNC: 3.6 GM/DL
GLUCOSE BLD-MCNC: 338 MG/DL (ref 70–110)
GLUCOSE BLDC GLUCOMTR-MCNC: 219 MG/DL (ref 70–130)
GLUCOSE BLDC GLUCOMTR-MCNC: 229 MG/DL (ref 70–130)
GLUCOSE BLDC GLUCOMTR-MCNC: 259 MG/DL (ref 70–130)
GLUCOSE BLDC GLUCOMTR-MCNC: 294 MG/DL (ref 70–130)
GLUCOSE BLDC GLUCOMTR-MCNC: 331 MG/DL (ref 70–130)
OSMOLALITY SERPL CALC.SUM OF ELEC: 296.8 MOSM/KG (ref 273–305)
POTASSIUM BLD-SCNC: 4.2 MMOL/L (ref 3.5–5.3)
PROT SERPL-MCNC: 7.9 G/DL (ref 6–8)
SODIUM BLD-SCNC: 141 MMOL/L (ref 135–153)

## 2017-06-21 PROCEDURE — 25010000002 HEPARIN (PORCINE) PER 1000 UNITS: Performed by: FAMILY MEDICINE

## 2017-06-21 PROCEDURE — 82962 GLUCOSE BLOOD TEST: CPT

## 2017-06-21 PROCEDURE — 25010000002 CEFTRIAXONE: Performed by: INTERNAL MEDICINE

## 2017-06-21 PROCEDURE — 96372 THER/PROPH/DIAG INJ SC/IM: CPT

## 2017-06-21 PROCEDURE — G0378 HOSPITAL OBSERVATION PER HR: HCPCS

## 2017-06-21 PROCEDURE — 63710000001 INSULIN ASPART PER 5 UNITS: Performed by: FAMILY MEDICINE

## 2017-06-21 PROCEDURE — 25010000002 VANCOMYCIN PER 500 MG

## 2017-06-21 PROCEDURE — 63710000001 INSULIN DETEMIR PER 5 UNITS: Performed by: FAMILY MEDICINE

## 2017-06-21 RX ORDER — HYDROXYZINE HYDROCHLORIDE 10 MG/1
10 TABLET, FILM COATED ORAL DAILY
Status: DISCONTINUED | OUTPATIENT
Start: 2017-06-21 | End: 2017-06-23 | Stop reason: HOSPADM

## 2017-06-21 RX ORDER — CLONIDINE HYDROCHLORIDE 0.1 MG/1
0.1 TABLET ORAL EVERY 12 HOURS SCHEDULED
Status: DISCONTINUED | OUTPATIENT
Start: 2017-06-21 | End: 2017-06-23 | Stop reason: HOSPADM

## 2017-06-21 RX ORDER — LOSARTAN POTASSIUM 50 MG/1
100 TABLET ORAL DAILY
Status: CANCELLED | OUTPATIENT
Start: 2017-06-21

## 2017-06-21 RX ORDER — BUSPIRONE HYDROCHLORIDE 10 MG/1
10 TABLET ORAL EVERY 12 HOURS SCHEDULED
Status: DISCONTINUED | OUTPATIENT
Start: 2017-06-21 | End: 2017-06-23 | Stop reason: HOSPADM

## 2017-06-21 RX ORDER — CETIRIZINE HYDROCHLORIDE 10 MG/1
5 TABLET ORAL DAILY
Status: CANCELLED | OUTPATIENT
Start: 2017-06-21

## 2017-06-21 RX ORDER — OXYCODONE AND ACETAMINOPHEN 10; 325 MG/1; MG/1
1 TABLET ORAL EVERY 8 HOURS SCHEDULED
Status: CANCELLED | OUTPATIENT
Start: 2017-06-21

## 2017-06-21 RX ORDER — OXYCODONE AND ACETAMINOPHEN 10; 325 MG/1; MG/1
1 TABLET ORAL 3 TIMES DAILY
Status: CANCELLED | OUTPATIENT
Start: 2017-06-21

## 2017-06-21 RX ORDER — LOSARTAN POTASSIUM 50 MG/1
100 TABLET ORAL DAILY
Status: DISCONTINUED | OUTPATIENT
Start: 2017-06-21 | End: 2017-06-23 | Stop reason: HOSPADM

## 2017-06-21 RX ORDER — NICOTINE POLACRILEX 4 MG
15 LOZENGE BUCCAL
Status: DISCONTINUED | OUTPATIENT
Start: 2017-06-21 | End: 2017-06-23 | Stop reason: HOSPADM

## 2017-06-21 RX ORDER — TEMAZEPAM 15 MG/1
30 CAPSULE ORAL NIGHTLY PRN
Status: DISCONTINUED | OUTPATIENT
Start: 2017-06-21 | End: 2017-06-23 | Stop reason: HOSPADM

## 2017-06-21 RX ORDER — LIDOCAINE HYDROCHLORIDE 10 MG/ML
20 INJECTION, SOLUTION INFILTRATION; PERINEURAL ONCE
Status: DISCONTINUED | OUTPATIENT
Start: 2017-06-21 | End: 2017-06-23 | Stop reason: HOSPADM

## 2017-06-21 RX ORDER — FLUOXETINE HYDROCHLORIDE 20 MG/1
20 CAPSULE ORAL DAILY
Status: CANCELLED | OUTPATIENT
Start: 2017-06-21

## 2017-06-21 RX ORDER — HEPARIN SODIUM 5000 [USP'U]/ML
5000 INJECTION, SOLUTION INTRAVENOUS; SUBCUTANEOUS EVERY 8 HOURS SCHEDULED
Status: DISCONTINUED | OUTPATIENT
Start: 2017-06-21 | End: 2017-06-23 | Stop reason: HOSPADM

## 2017-06-21 RX ORDER — CLONIDINE HYDROCHLORIDE 0.1 MG/1
0.1 TABLET ORAL 2 TIMES DAILY
COMMUNITY
End: 2018-11-25 | Stop reason: HOSPADM

## 2017-06-21 RX ORDER — TIZANIDINE 4 MG/1
4 TABLET ORAL 4 TIMES DAILY
Status: CANCELLED | OUTPATIENT
Start: 2017-06-21

## 2017-06-21 RX ORDER — ATENOLOL 50 MG/1
50 TABLET ORAL EVERY 12 HOURS SCHEDULED
Status: CANCELLED | OUTPATIENT
Start: 2017-06-21

## 2017-06-21 RX ORDER — ATENOLOL 50 MG/1
50 TABLET ORAL 2 TIMES DAILY
Status: DISCONTINUED | OUTPATIENT
Start: 2017-06-21 | End: 2017-06-23 | Stop reason: HOSPADM

## 2017-06-21 RX ORDER — HYDROCODONE BITARTRATE AND ACETAMINOPHEN 7.5; 325 MG/1; MG/1
1 TABLET ORAL EVERY 8 HOURS PRN
Status: DISCONTINUED | OUTPATIENT
Start: 2017-06-21 | End: 2017-06-23 | Stop reason: HOSPADM

## 2017-06-21 RX ORDER — GABAPENTIN 400 MG/1
800 CAPSULE ORAL EVERY 8 HOURS PRN
Status: DISCONTINUED | OUTPATIENT
Start: 2017-06-21 | End: 2017-06-23 | Stop reason: HOSPADM

## 2017-06-21 RX ORDER — DEXTROSE MONOHYDRATE 25 G/50ML
25 INJECTION, SOLUTION INTRAVENOUS
Status: DISCONTINUED | OUTPATIENT
Start: 2017-06-21 | End: 2017-06-23 | Stop reason: HOSPADM

## 2017-06-21 RX ORDER — HYDROCODONE BITARTRATE AND ACETAMINOPHEN 10; 325 MG/1; MG/1
1 TABLET ORAL EVERY 6 HOURS PRN
Status: CANCELLED | OUTPATIENT
Start: 2017-06-21

## 2017-06-21 RX ORDER — CETIRIZINE HYDROCHLORIDE 10 MG/1
5 TABLET ORAL DAILY
Status: DISCONTINUED | OUTPATIENT
Start: 2017-06-21 | End: 2017-06-23 | Stop reason: HOSPADM

## 2017-06-21 RX ORDER — BUSPIRONE HYDROCHLORIDE 10 MG/1
10 TABLET ORAL 2 TIMES DAILY
Status: CANCELLED | OUTPATIENT
Start: 2017-06-21

## 2017-06-21 RX ORDER — CLONIDINE HYDROCHLORIDE 0.1 MG/1
0.1 TABLET ORAL 2 TIMES DAILY
Status: CANCELLED | OUTPATIENT
Start: 2017-06-21

## 2017-06-21 RX ORDER — TIZANIDINE 4 MG/1
4 TABLET ORAL 4 TIMES DAILY
Status: DISCONTINUED | OUTPATIENT
Start: 2017-06-21 | End: 2017-06-23 | Stop reason: HOSPADM

## 2017-06-21 RX ORDER — HYDROCODONE BITARTRATE AND ACETAMINOPHEN 10; 325 MG/1; MG/1
1 TABLET ORAL EVERY 6 HOURS PRN
Status: DISCONTINUED | OUTPATIENT
Start: 2017-06-21 | End: 2017-06-23 | Stop reason: HOSPADM

## 2017-06-21 RX ORDER — TEMAZEPAM 15 MG/1
30 CAPSULE ORAL NIGHTLY PRN
Status: CANCELLED | OUTPATIENT
Start: 2017-06-21

## 2017-06-21 RX ORDER — GABAPENTIN 300 MG/1
600 CAPSULE ORAL EVERY 12 HOURS SCHEDULED
Status: CANCELLED | OUTPATIENT
Start: 2017-06-21

## 2017-06-21 RX ORDER — HYDROXYZINE HYDROCHLORIDE 10 MG/1
10 TABLET, FILM COATED ORAL DAILY
Status: CANCELLED | OUTPATIENT
Start: 2017-06-21

## 2017-06-21 RX ORDER — FLUOXETINE HYDROCHLORIDE 20 MG/1
20 CAPSULE ORAL DAILY
Status: DISCONTINUED | OUTPATIENT
Start: 2017-06-21 | End: 2017-06-23 | Stop reason: HOSPADM

## 2017-06-21 RX ADMIN — ATENOLOL 50 MG: 50 TABLET ORAL at 17:51

## 2017-06-21 RX ADMIN — ATENOLOL 50 MG: 50 TABLET ORAL at 08:37

## 2017-06-21 RX ADMIN — INSULIN ASPART 10 UNITS: 100 INJECTION, SOLUTION INTRAVENOUS; SUBCUTANEOUS at 17:14

## 2017-06-21 RX ADMIN — TIZANIDINE 4 MG: 4 TABLET ORAL at 21:04

## 2017-06-21 RX ADMIN — CLONIDINE HYDROCHLORIDE 0.1 MG: 0.1 TABLET ORAL at 21:04

## 2017-06-21 RX ADMIN — CEFTRIAXONE 2 G: 2 INJECTION, POWDER, FOR SOLUTION INTRAMUSCULAR; INTRAVENOUS at 10:57

## 2017-06-21 RX ADMIN — FLUOXETINE 20 MG: 20 CAPSULE ORAL at 13:03

## 2017-06-21 RX ADMIN — TIZANIDINE 4 MG: 4 TABLET ORAL at 17:51

## 2017-06-21 RX ADMIN — HYDROCODONE BITARTRATE AND ACETAMINOPHEN 1 TABLET: 10; 325 TABLET ORAL at 21:31

## 2017-06-21 RX ADMIN — TIZANIDINE 4 MG: 4 TABLET ORAL at 13:02

## 2017-06-21 RX ADMIN — CLONIDINE HYDROCHLORIDE 0.1 MG: 0.1 TABLET ORAL at 11:52

## 2017-06-21 RX ADMIN — VANCOMYCIN HYDROCHLORIDE 1250 MG: 5 INJECTION, POWDER, LYOPHILIZED, FOR SOLUTION INTRAVENOUS at 13:04

## 2017-06-21 RX ADMIN — HYDROCODONE BITARTRATE AND ACETAMINOPHEN 1 TABLET: 7.5; 325 TABLET ORAL at 07:03

## 2017-06-21 RX ADMIN — INSULIN ASPART 8 UNITS: 100 INJECTION, SOLUTION INTRAVENOUS; SUBCUTANEOUS at 21:31

## 2017-06-21 RX ADMIN — HEPARIN SODIUM 5000 UNITS: 5000 INJECTION, SOLUTION INTRAVENOUS; SUBCUTANEOUS at 21:05

## 2017-06-21 RX ADMIN — BUSPIRONE HYDROCHLORIDE 10 MG: 10 TABLET ORAL at 13:04

## 2017-06-21 RX ADMIN — HEPARIN SODIUM 5000 UNITS: 5000 INJECTION, SOLUTION INTRAVENOUS; SUBCUTANEOUS at 11:51

## 2017-06-21 RX ADMIN — HYDROXYZINE HYDROCHLORIDE 10 MG: 10 TABLET ORAL at 13:02

## 2017-06-21 RX ADMIN — HYDROCODONE BITARTRATE AND ACETAMINOPHEN 1 TABLET: 10; 325 TABLET ORAL at 13:33

## 2017-06-21 RX ADMIN — INSULIN DETEMIR 22 UNITS: 100 INJECTION, SOLUTION SUBCUTANEOUS at 11:53

## 2017-06-21 RX ADMIN — INSULIN DETEMIR 22 UNITS: 100 INJECTION, SOLUTION SUBCUTANEOUS at 21:34

## 2017-06-21 RX ADMIN — INSULIN ASPART 8 UNITS: 100 INJECTION, SOLUTION INTRAVENOUS; SUBCUTANEOUS at 11:51

## 2017-06-21 RX ADMIN — BUSPIRONE HYDROCHLORIDE 10 MG: 10 TABLET ORAL at 21:05

## 2017-06-21 RX ADMIN — CETIRIZINE HYDROCHLORIDE 5 MG: 10 TABLET ORAL at 13:03

## 2017-06-21 RX ADMIN — LOSARTAN POTASSIUM 100 MG: 50 TABLET, FILM COATED ORAL at 13:02

## 2017-06-21 RX ADMIN — TEMAZEPAM 30 MG: 15 CAPSULE ORAL at 22:39

## 2017-06-21 RX ADMIN — HEPARIN SODIUM 5000 UNITS: 5000 INJECTION, SOLUTION INTRAVENOUS; SUBCUTANEOUS at 15:25

## 2017-06-21 NOTE — NURSING NOTE
Dry wound to left foot covered with black eschar.  Callous noted to edges.  Per GLORIA Groves, patient will have debridement tomorrow with Dr. Morris.      Treatment ordered.

## 2017-06-21 NOTE — PLAN OF CARE
Problem: Patient Care Overview (Adult)  Goal: Plan of Care Review  Outcome: Ongoing (interventions implemented as appropriate)  Goal: Discharge Needs Assessment  Outcome: Ongoing (interventions implemented as appropriate)    Problem: Diabetes, Type 2 (Adult)  Goal: Signs and Symptoms of Listed Potential Problems Will be Absent or Manageable (Diabetes, Type 2)  Outcome: Ongoing (interventions implemented as appropriate)    Problem: Infection, Risk/Actual (Adult)  Goal: Identify Related Risk Factors and Signs and Symptoms  Outcome: Ongoing (interventions implemented as appropriate)  Goal: Infection Prevention/Resolution  Outcome: Ongoing (interventions implemented as appropriate)

## 2017-06-21 NOTE — ED PROVIDER NOTES
Subjective   HPI Comments: 56 yo WF, presented to ER w/ c/o of wound on left foot.  Pt is hx for diabetic foot ulcers.  Pt caregiver reports that wound on lateral side of left foot has worsened over course of day.  Foot has become swollen and he noticed red streaking.  Previous tx of foot ulcer on planter side has been healing, he admitted to packing wound.  Pt is taking IV ABX at home.        Review of Systems   Constitutional: Negative.  Negative for fever.   HENT: Negative.    Respiratory: Negative.    Cardiovascular: Negative.  Negative for chest pain.   Gastrointestinal: Negative.  Negative for abdominal pain.   Endocrine: Negative.    Genitourinary: Negative.  Negative for dysuria.   Musculoskeletal: Positive for gait problem.   Skin: Negative.    Psychiatric/Behavioral: Negative.    All other systems reviewed and are negative.      Past Medical History:   Diagnosis Date   • Arthritis    • Depression    • Diabetes mellitus    • Hypertension        Allergies   Allergen Reactions   • Codeine      Pt tolerates Norco @ home   • Latex      Added based on information entered during case entry, please review and add reactions, type, and severity as needed   • Morphine And Related Confusion   • Penicillins    • Sulfa Antibiotics        Past Surgical History:   Procedure Laterality Date   • BACK SURGERY     • CATARACT EXTRACTION      Left    •  SECTION     • DENTAL PROCEDURE     • HYSTERECTOMY     • INCISION AND DRAINAGE LEG Left 4/15/2017    Procedure: INCISION AND DRAINAGE LOWER EXTREMITY;  Surgeon: Basilio Morris MD;  Location: [facility] OR;  Service:    • INCISION AND DRAINAGE LEG Left 2017    Procedure: Irrigation and Debridment abcess diabetic wound left foot with deep culture;  Surgeon: Basilio Morris MD;  Location: [facility] OR;  Service:    • TOE AMPUTATION     • TUBAL ABDOMINAL LIGATION         Family History   Problem Relation Age of Onset   • Heart disease Mother    • Cancer Mother    • COPD Mother         Social History     Social History   • Marital status:      Spouse name: N/A   • Number of children: N/A   • Years of education: N/A     Social History Main Topics   • Smoking status: Never Smoker   • Smokeless tobacco: None   • Alcohol use No   • Drug use: No   • Sexual activity: Defer     Other Topics Concern   • None     Social History Narrative           Objective   Physical Exam   Constitutional: She is oriented to person, place, and time. She appears well-developed and well-nourished. No distress.   HENT:   Head: Normocephalic and atraumatic.   Nose: Nose normal.   Eyes: Conjunctivae and EOM are normal. Pupils are equal, round, and reactive to light.   Neck: Normal range of motion. Neck supple. No JVD present. No tracheal deviation present.   Cardiovascular: Normal rate, regular rhythm and normal heart sounds.    No murmur heard.  Pulmonary/Chest: Effort normal and breath sounds normal. No respiratory distress. She has no wheezes.   Abdominal: Soft. Bowel sounds are normal. There is no tenderness.   Musculoskeletal: Normal range of motion. She exhibits no edema or deformity.   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    Neurological: She is alert and oriented to person, place, and time. No cranial nerve deficit.   Skin: Skin is warm and dry. No rash noted. She is not diaphoretic. No erythema. No pallor.   Lateral left foot, ulcer w/ discoloration.  Erythema noted surrounding wound.  Swelling noted to left foot.  Planter side diabetic foot ulcer appears to be healing    Psychiatric: She has a normal mood and affect. Her behavior is normal. Thought content normal.   Nursing note and vitals reviewed.      Procedures         ED Course  ED Course                  MDM  Number of Diagnoses or Management Options  Diabetic ulcer of left foot associated with type 2 diabetes mellitus: established and worsening     Amount and/or Complexity of Data Reviewed  Clinical lab tests: ordered and reviewed    Risk  of Complications, Morbidity, and/or Mortality  Presenting problems: moderate  Diagnostic procedures: moderate  Management options: moderate    Patient Progress  Patient progress: stable      Final diagnoses:   Diabetic ulcer of left foot associated with type 2 diabetes mellitus            ESSENCE Quintanilla  06/21/17 0034

## 2017-06-21 NOTE — NURSING NOTE
Dr Morris called instructed pt he will do debridement in am.. Patient now taking fluids and eating  Clean dressing reapplied to left foot

## 2017-06-21 NOTE — CONSULTS
"Diabetes Education  Assessment/Teaching    Patient Name:  Reny Elena  YOB: 1960  MRN: [id number]  Admit Date:  6/20/2017      Assessment Date:  6/21/2017       Most Recent Value    General Information      Height  5' 5\" (1.651 m)    Height Method  Stated    Weight  145 lb 3.2 oz (65.9 kg)    Weight Method  Bed scale    Pregnancy Assessment     Diabetes History     What type of diabetes do you have?  Type 2    Length of Diabetes Diagnosis  10 + years    Current DM knowledge  good    Have you had diabetes education/teaching in the past?  yes [mother 5/6 yrs learned about it]    Do you test your blood sugar at home?  yes    How would you rate your diabetes control?  good    When was the last time your doctor checked your feet?  Dr Morris is coming to take care of my foot    What makes it difficult for you to take care of your diabetes or yourself?  above    Do you have any diabetes complications?  foot ulcers    Education Preferences     Nutrition Information     Assessment Topics     Healthy Eating - Assessment  Competent    Being Active - Assessment  Competent    Taking Medication - Assessment  Competent    Problem Solving - Assessment  Competent    Reducing Risk - Assessment  Competent    Healthy Coping - Assessment  Competent    Monitoring - Assessment  Competent    DM Goals                Most Recent Value    DM Education Needs     Meter  Has own    Problem Solving  Hypoglycemia, Hyperglycemia, Sick days, Signs, Symptoms, Treatment    Healthy Coping  Appropriate    Discharge Plan  Home    Motivation  Engaged    Teaching Method  Explanation, Discussion, Teach back    Patient Response  Verbalized understanding            Other Comments:          Electronically signed by:  Karrie Rodríguez RN  06/21/17 1:51 PM  "

## 2017-06-21 NOTE — H&P
CHIEF COMPLAINT:  New ulcer on left foot.     HISTORY OF PRESENT ILLNESS: The patient is a 57-year-old insulin-dependent diabetic that is having a new ulcer developed over her distal 5th MCP joint. She was brought to the emergency room by her  and was admitted for new diabetic ulcer. She recently has been going through therapy and is on home IV antibiotics for osteomyelitis, status post I and D of foot abscess. She currently is taking dapsone and Rocephin. The patient was admitted, continued on IV antibiotics and will have Dr. Morris evaluate. If change in antibiotics need to occur, then we will consult infectious disease.     SOCIAL HISTORY: The patient does not smoke or drink.     ALLERGIES:   1.  CODEINE.    2.  SULFA DRUGS.     MEDICATIONS:  1.  Atenolol 100.    2.  BuSpar 10.   3.  Zyrtec 10.   4.  Prozac 20.  5.  Gabapentin 800.   6.  Hydrocodone 10.   7.  Levemir.   8.  Losartan 100.    9.  Temazepam.     PAST SURGICAL HISTORY:   1.  Previous foot surgeries with amputation of great toe.    2.  Hysterectomy.    3.  Prior back surgery.     PHYSICAL EXAMINATION:  GENERAL:  She is alert, oriented, in no acute distress.   NECK: Supple.   LUNGS: Clear.   HEART: S1 and S2.   ABDOMEN: Soft.    EXTREMITIES:  Left foot dressed.     IMPRESSION: New diabetic foot ulcer.     PLAN: Continue antibiotics and consult Dr. Morris for further evaluation and recommendations.       D: PARAMJIT 06/21/2017 06:27:41 ET  T: brittany / 06/21/2017 06:34:20 ET  Revision Count: 0  Job ID: [id number]  [id number]  cc:        Dictator Signature:___________________________     Yandel Paulson MD

## 2017-06-21 NOTE — PROGRESS NOTES
Discharge Planning Assessment  [facility]     Patient Name: Reny Elena  MRN: [id number]  Today's Date: 6/21/2017    Admit Date: 6/20/2017          Discharge Needs Assessment       06/21/17 1234    Living Environment    Lives With significant other   Pt lives with significant other, Cliff Abbie.     Transportation Available family or friend will provide    Living Environment    Quality Of Family Relationships supportive    Able to Return to Prior Living Arrangements yes    Discharge Needs Assessment    Readmission Within The Last 30 Days --   Pt was discharged from [facility] [place] on 5-30-17.     Outpatient/Agency/Support Group Needs --   Pt is receiving IV Rocephin and IV Daptomycin from [facility] and was ordered 7 more days of IV antibiotics on 6-20-17. ID consulted and states pt can be discharged. SS attempted contact with Dr. Paulson and left message.      Equipment Currently Used at Home wheelchair;cane, straight    Discharge Facility/Level Of Care Needs home with home health   Pt utilizes [facility] for skilled nursing services.     Discharge Disposition home healthcare service            Discharge Plan       06/21/17 1235    Case Management/Social Work Plan    Plan Pt lives at home with sign. other, Cliff Leiva and plans to return home at discharge. [phone]. Pt has a wheelchair and straight cane from unknown provider. Pt is currently receiving home IV Rocephin and IV Daptomycin at home from [facility] and has been ordered IV antibiotics for one more week. [facility] [phone] to be contacted at discharge. SS to follow and assist as needed with discharge planning.     13:13 SS spoke to Dr. Paulson who states nurse informed him that pt is scheduled for surgery in am. SS to follow and assist as needed with discharge planning.     Patient/Family In Agreement With Plan yes        Discharge  Placement     Facility/Agency Request Status Selected? Address Phone Number Fax Number    [facility] [place] Pending - No Request Sent     [address] [place] [address] [phone]                Demographic Summary       06/21/17 1234    Referral Information    Referral Source nursing    Reason For Consult --   SS received consult HH. SS spoke to pt.     Primary Care Physician Information    Name Dr. Paulson and pt also see's Dr. Morris and Dr. Mendez          Legal       06/21/17 1235    Legal    Legal Comments Pt does not have a POA or advance directive. Pt has received information about advance directive in the past.           Jihan Dugan

## 2017-06-21 NOTE — PLAN OF CARE
Problem: Patient Care Overview (Adult)  Goal: Discharge Needs Assessment  Outcome: Ongoing (interventions implemented as appropriate)  Discharge Planning Assessment   [facility]     Patient Name: Rney Elena                   MRN: [id number]  Today's Date: 6/21/2017                     Admit Date: 6/20/2017             Discharge Needs Assessment        06/21/17 1235     Living Environment     Lives With significant other   Pt lives with significant other, Cliff Leiva.      Transportation Available family or friend will provide     Living Environment     Quality Of Family Relationships supportive     Able to Return to Prior Living Arrangements yes     Discharge Needs Assessment     Readmission Within The Last 30 Days --   Pt was discharged from [facility] on 5-30-17.      Outpatient/Agency/Support Group Needs --   Pt is receiving IV Rocephin and IV Daptomycin from [facility] and was ordered 7 more days of IV antibiotics on 6-20-17. ID consulted and states pt can be discharged. SS attempted contact with Dr. Paulson and left message.       Equipment Currently Used at Home wheelchair;cane, straight     Discharge Facility/Level Of Care Needs home with home health   [facility].      Discharge Disposition home healthcare service       [facility]       Discharge Plan        06/21/17 1238     Case Management/Social Work Plan     Plan Pt lives at home with sign. other, Cliff Leiva and plans to return home at discharge. [phone] to be contacted at discharge. Pt has a wheelchair and straight cane from unknown provider. Pt is currently receiving home IV Rocephin and IV Daptomycin at home from [facility] and has been ordered IV antibiotics for one more week. [facility] [phone] to be contacted at discharge. SS to follow and assist as needed with discharge planning.      Patient/Family In  Agreement With Plan yes       mk  Discharge Placement      Facility/Agency Request Status Selected? Address Phone Number Fax Number     [facility] [place] Pending - No Request Sent       [address] [place] [address] [address] [phone]                     Demographic Summary        06/21/17 1234     Referral Information     Referral Source nursing     Reason For Consult --   SS received consult HH. SS spoke to pt.      Primary Care Physician Information     Name Dr. Paulson and pt also see's Dr. Morris and Dr. Mendez        [facility]       Legal        06/21/17 1235     Legal     Legal Comments Pt does not have a POA or advance directive. Pt has received information about advance directive in the past.    Jihan Dugan

## 2017-06-21 NOTE — PHARMACY RECOMMENDATION
Recommend renally adjusting home medications with patients current renal function of approximately 38mL/min. Literature recommends zyrtec 5 mg daily for CrCl less than 50 mL/min and gabapentin dose is recommended to dose at 200-700 mg BID for CrCl 30-59 mL/min.  Thank you,  Megan Lakhani. Luis, [facility]  06/21/17  11:38 AM

## 2017-06-21 NOTE — CONSULTS
INFECTIOUS DISEASE CONSULTATION REPORT      Referring Provider: Dr. Paulson  Reason for Consultation: 5th metatarsal diabetic ulcer      Subjective .     History of present illness:    As you well know Dr. Paulson, MsTyson Elena is a 57 y.o. years old female with past medical history significant for arthritis, depression, diabetes mellitus, hypertension, who presented to [facility] Emergency Department on 6/20/2017 for diabetic foot ulcer.  Patient had previous diagnosis of plantar ulcer with underlying osteomyelitis and has been following up with Dr. Maradiaga and Dr. ferguson as outpatient receiving IV daptomycin and high-dose Rocephin with significant improvement of the infection, decreased amount of packing and complete healing of the ulcer.  But unfortunately since then and possibly related to a compression boot patient developed a left foot fifth metatarsal ulcer with scab and surrounding erythema.    Infectious Disease consultation was requested for antimicrobial management.     History taken from: patient chart RN    Case was discussed with patient and nursing staff    Review of Systems     Constitutional: no fever, chills and night sweats. No appetite change or unexpected weight change. No fatigue.  Eyes: no eye drainage, itching or redness.  HEENT: no mouth sores, dysphagia or nose bleed.  Respiratory: no for shortness of breath, cough or production of sputum.  Cardiovascular: no chest pain, no palpitations, no orthopnea.  Gastrointestinal: no nausea, vomiting or diarrhea. No abdominal pain, hematemesis or rectal bleeding.  Genitourinary: no dysuria or polyuria.  Hematologic/lymphatic: no lymph node abnormalities, no easy bruising or easy bleeding.  Musculoskeletal: See history of present illness.  Skin: See history of present illness  Neurological: no loss of consciousness, no seizure, no headache.  Behavioral/Psych: no depression or suicidal ideation.  Endocrine: no hot flashes.  Immunologic:  "negative.    Past Medical History    Past Medical History:   Diagnosis Date   • Arthritis    • Depression    • Diabetes mellitus    • Hypertension        Past Surgical History    Past Surgical History:   Procedure Laterality Date   • BACK SURGERY     • CATARACT EXTRACTION      Left    •  SECTION     • DENTAL PROCEDURE     • HYSTERECTOMY     • INCISION AND DRAINAGE LEG Left 4/15/2017    Procedure: INCISION AND DRAINAGE LOWER EXTREMITY;  Surgeon: Basilio Morris MD;  Location: [facility];  Service:    • INCISION AND DRAINAGE LEG Left 2017    Procedure: Irrigation and Debridment abcess diabetic wound left foot with deep culture;  Surgeon: Basilio Morris MD;  Location: [facility];  Service:    • TOE AMPUTATION     • TUBAL ABDOMINAL LIGATION         Family History    Family History   Problem Relation Age of Onset   • Heart disease Mother    • Cancer Mother    • COPD Mother        Social History    Social History   Substance Use Topics   • Smoking status: Never Smoker   • Smokeless tobacco: None   • Alcohol use No       Allergies    Codeine; Latex; Morphine and related; Penicillins; and Sulfa antibiotics    Objective     BP (!) 190/97 (BP Location: Left arm, Patient Position: Lying) Comment: rn darvin aware  Pulse 66  Temp 98.1 °F (36.7 °C) (Oral)   Resp 20  Ht 65\" (165.1 cm)  Wt 145 lb 3.2 oz (65.9 kg)  LMP 2005  SpO2 97%  BMI 24.16 kg/m2    Temp:  [97.5 °F (36.4 °C)-98.5 °F (36.9 °C)] 98.1 °F (36.7 °C)      Physical Exam:      General Appearance:    Alert, cooperative, in no acute distress   Head:    Normocephalic, without obvious abnormality, atraumatic   Eyes:            Lids and lashes normal, conjunctivae and sclerae normal, no   icterus, no pallor, corneas clear, PERRLA   Ears:    Ears appear intact with no abnormalities noted   Throat:   No oral lesions, no thrush, oral mucosa moist   Neck:   No adenopathy, supple, trachea midline, no thyromegaly, no   carotid bruit, no JVD   Back:     " No tenderness to percussion or palpation, range of motion   normal   Lungs:     Clear to auscultation,respirations regular, even and unlabored. No wheezing, no ronchi and no crackles.    Heart:    Regular rhythm and normal rate, normal S1 and S2, no            murmur, no gallop, no rub, no click   Chest Wall:    No abnormalities observed   Abdomen:     Normal bowel sounds, no masses, no organomegaly, soft        non-tender, non-distended, no guarding, no rebound                tenderness   Rectal:     Deferred   Extremities:   Moves all extremities well, no edema, no cyanosis, no             redness   Pulses:   Pulses palpable and equal bilaterally   Skin:   Left fifth metatarsal ulcer with scab and surrounding erythema.  No drainage .   Lymph nodes:   No palpable adenopathy   Neurologic:   Awake, alert and oriented x 3. Following commands.       Results:      Results from last 7 days  Lab Units 06/20/17  2337 06/19/17  1200   WBC 10*3/mm3 5.70 4.13*     Lab Results   Component Value Date    NEUTROABS 3.08 06/20/2017         Results from last 7 days  Lab Units 06/20/17  2337   CREATININE mg/dL 1.30*         Results from last 7 days  Lab Units 06/20/17  2337 06/19/17  1200   CRP mg/dL <0.50 <0.50       Imaging Results (last 24 hours)     ** No results found for the last 24 hours. **            Cultures:         Results Review:    I have personally reviewed laboratory data, culture results, radiology studies and antimicrobial therapy.    Hospital Medications (active)       Dose Frequency Start End    atenolol (TENORMIN) tablet 50 mg 50 mg 2 Times Daily 6/21/2017     Sig - Route: Take 1 tablet by mouth 2 (Two) Times a Day. - Oral    CloNIDine (CATAPRES) tablet 0.1 mg 0.1 mg Once 6/20/2017 6/20/2017    Sig - Route: Take 1 tablet by mouth 1 (One) Time. - Oral    dextrose (D50W) solution 25 g 25 g Every 15 Minutes PRN 6/21/2017     Sig - Route: Infuse 50 mL into a venous catheter Every 15 (Fifteen) Minutes As Needed for  Low Blood Sugar (Blood Sugar Less Than 70, Patient Has IV Access - Unresponsive, NPO or Unable To Safely Swallow). - Intravenous    dextrose (GLUTOSE) oral gel 15 g 15 g Every 15 Minutes PRN 6/21/2017     Sig - Route: Take 15 g by mouth Every 15 (Fifteen) Minutes As Needed for Low Blood Sugar (Blood Sugar Less Than 70, Patient Alert, Is Not NPO & Can Safely Swallow). - Oral    glucagon (human recombinant) (GLUCAGEN DIAGNOSTIC) injection 1 mg 1 mg Every 15 Minutes PRN 6/21/2017     Sig - Route: Inject 1 mg under the skin Every 15 (Fifteen) Minutes As Needed (Blood Glucose Less Than 70 - Patient Without IV Access - Unresponsive, NPO or Unable To Safely Swallow). - Subcutaneous    heparin (porcine) 5000 UNIT/ML injection 5,000 Units 5,000 Units Every 8 Hours Scheduled 6/21/2017     Sig - Route: Inject 1 mL under the skin Every 8 (Eight) Hours. - Subcutaneous    HYDROcodone-acetaminophen (NORCO) 7.5-325 MG per tablet 1 tablet 1 tablet Every 8 Hours PRN 6/21/2017 7/1/2017    Sig - Route: Take 1 tablet by mouth Every 8 (Eight) Hours As Needed for Moderate Pain (4-6). - Oral    insulin aspart (novoLOG) injection 0-14 Units 0-14 Units 4 Times Daily Before Meals & Nightly 6/21/2017     Sig - Route: Inject 0-14 Units under the skin 4 (Four) Times a Day Before Meals & at Bedtime. - Subcutaneous    insulin detemir (LEVEMIR) injection 22 Units 22 Units Every 12 Hours Scheduled 6/21/2017     Sig - Route: Inject 22 Units under the skin Every 12 (Twelve) Hours. - Subcutaneous    meperidine (DEMEROL) injection 25 mg 25 mg Once 6/20/2017 6/20/2017    Sig - Route: Infuse 1 mL into a venous catheter 1 (One) Time. - Intravenous            PROBLEM LIST:    Patient Active Problem List   Diagnosis   • Cellulitis of foot, left   • Cellulitis and abscess of foot   • Diabetic foot ulcers       Assessment/Plan     ASSESSMENT:    1.  Diabetic foot ulcer versus osteomyelitis    PLAN:    At this point patient shows no evidence suggestive of  active infection especially in the setting of normal CRP level.  Most likely her ulcer is related to a mechanical pressure and would require topical therapy and the relief of the pressure with very close surgical follow-up as she is at very high risk of developing underlying osteomyelitis.  For now would recommend for now to continue antibiotic therapy Kendall substituted daptomycin with vancomycin and to continue high-dose Rocephin 2 g IV every 24 hours as her regimen is supposed to finish in about a week with already follow-up scheduled with Dr. Maradiaga in July.    Patient can be discharged home from ID standpoint.      Patients findings and recommendations were discussed with patient    Code Status: Full Code    Attestation:  NIKO Bryant RN acting as scribe for Dr. Joanne Bryant RN  06/21/17  9:24 AM

## 2017-06-21 NOTE — PROGRESS NOTES
Pharmacokinetics Service Note:    Ms. Elena has been evaluated for vancomycin dosing to treat her L 5th MT diabetic foot ulcer/osteomyelitis.  Based on her previous dosing history on our service (4/2017) along with her currently estimated ClCr of around 38 ml/min and demographics, will dose at 1.25 gm q18hrs to target troughs = 15-20 mg/L.  Will follow her renal function closely and monitor/adjust as appropriate.      Thank you.  Amy Hairston, Pharm.D.  6/21/2017  10:12 AM

## 2017-06-21 NOTE — ED NOTES
Patient lying in bed at this time. Requests a blanket. [place] provided to patient. PA at bedside with RN. Updated patient on POC and awaiting for room to become available. Denies any needs at this time. Resps even and unlabored. CANDIE. TRISTAN. Call light within reach.      Nkechi Herzog RN  06/21/17 1445

## 2017-06-22 LAB
ANION GAP SERPL CALCULATED.3IONS-SCNC: 7.2 MMOL/L (ref 3.6–11.2)
BUN BLD-MCNC: 18 MG/DL (ref 7–21)
BUN/CREAT SERPL: 15.8 (ref 7–25)
CALCIUM SPEC-SCNC: 9.1 MG/DL (ref 7.7–10)
CHLORIDE SERPL-SCNC: 105 MMOL/L (ref 99–112)
CO2 SERPL-SCNC: 28.8 MMOL/L (ref 24.3–31.9)
CREAT BLD-MCNC: 1.14 MG/DL (ref 0.43–1.29)
GFR SERPL CREATININE-BSD FRML MDRD: 49 ML/MIN/1.73
GLUCOSE BLD-MCNC: 113 MG/DL (ref 70–110)
GLUCOSE BLDC GLUCOMTR-MCNC: 134 MG/DL (ref 70–130)
GLUCOSE BLDC GLUCOMTR-MCNC: 147 MG/DL (ref 70–130)
GLUCOSE BLDC GLUCOMTR-MCNC: 243 MG/DL (ref 70–130)
GLUCOSE BLDC GLUCOMTR-MCNC: 264 MG/DL (ref 70–130)
OSMOLALITY SERPL CALC.SUM OF ELEC: 284 MOSM/KG (ref 273–305)
POTASSIUM BLD-SCNC: 4 MMOL/L (ref 3.5–5.3)
SODIUM BLD-SCNC: 141 MMOL/L (ref 135–153)

## 2017-06-22 PROCEDURE — 25010000002 CEFTRIAXONE: Performed by: INTERNAL MEDICINE

## 2017-06-22 PROCEDURE — 87205 SMEAR GRAM STAIN: CPT | Performed by: PODIATRIST

## 2017-06-22 PROCEDURE — 87070 CULTURE OTHR SPECIMN AEROBIC: CPT | Performed by: PODIATRIST

## 2017-06-22 PROCEDURE — 82962 GLUCOSE BLOOD TEST: CPT

## 2017-06-22 PROCEDURE — 63710000001 INSULIN DETEMIR PER 5 UNITS: Performed by: FAMILY MEDICINE

## 2017-06-22 PROCEDURE — G0378 HOSPITAL OBSERVATION PER HR: HCPCS

## 2017-06-22 PROCEDURE — 25010000002 HEPARIN (PORCINE) PER 1000 UNITS: Performed by: FAMILY MEDICINE

## 2017-06-22 PROCEDURE — 63710000001 INSULIN ASPART PER 5 UNITS: Performed by: FAMILY MEDICINE

## 2017-06-22 PROCEDURE — 96372 THER/PROPH/DIAG INJ SC/IM: CPT

## 2017-06-22 PROCEDURE — 25010000002 VANCOMYCIN PER 500 MG

## 2017-06-22 PROCEDURE — 80048 BASIC METABOLIC PNL TOTAL CA: CPT

## 2017-06-22 RX ADMIN — HYDROCODONE BITARTRATE AND ACETAMINOPHEN 1 TABLET: 7.5; 325 TABLET ORAL at 09:05

## 2017-06-22 RX ADMIN — INSULIN ASPART 5 UNITS: 100 INJECTION, SOLUTION INTRAVENOUS; SUBCUTANEOUS at 12:06

## 2017-06-22 RX ADMIN — BUSPIRONE HYDROCHLORIDE 10 MG: 10 TABLET ORAL at 20:47

## 2017-06-22 RX ADMIN — LOSARTAN POTASSIUM 100 MG: 50 TABLET, FILM COATED ORAL at 09:04

## 2017-06-22 RX ADMIN — CETIRIZINE HYDROCHLORIDE 5 MG: 10 TABLET ORAL at 09:04

## 2017-06-22 RX ADMIN — HEPARIN SODIUM 5000 UNITS: 5000 INJECTION, SOLUTION INTRAVENOUS; SUBCUTANEOUS at 05:28

## 2017-06-22 RX ADMIN — INSULIN ASPART 8 UNITS: 100 INJECTION, SOLUTION INTRAVENOUS; SUBCUTANEOUS at 18:10

## 2017-06-22 RX ADMIN — TIZANIDINE 4 MG: 4 TABLET ORAL at 09:04

## 2017-06-22 RX ADMIN — ATENOLOL 50 MG: 50 TABLET ORAL at 09:05

## 2017-06-22 RX ADMIN — BUSPIRONE HYDROCHLORIDE 10 MG: 10 TABLET ORAL at 09:06

## 2017-06-22 RX ADMIN — CEFTRIAXONE 2 G: 2 INJECTION, POWDER, FOR SOLUTION INTRAMUSCULAR; INTRAVENOUS at 10:53

## 2017-06-22 RX ADMIN — TIZANIDINE 4 MG: 4 TABLET ORAL at 18:09

## 2017-06-22 RX ADMIN — FLUOXETINE 20 MG: 20 CAPSULE ORAL at 09:04

## 2017-06-22 RX ADMIN — INSULIN DETEMIR 22 UNITS: 100 INJECTION, SOLUTION SUBCUTANEOUS at 20:56

## 2017-06-22 RX ADMIN — CLONIDINE HYDROCHLORIDE 0.1 MG: 0.1 TABLET ORAL at 09:04

## 2017-06-22 RX ADMIN — TEMAZEPAM 30 MG: 15 CAPSULE ORAL at 23:48

## 2017-06-22 RX ADMIN — CLONIDINE HYDROCHLORIDE 0.1 MG: 0.1 TABLET ORAL at 20:47

## 2017-06-22 RX ADMIN — ATENOLOL 50 MG: 50 TABLET ORAL at 18:09

## 2017-06-22 RX ADMIN — INSULIN DETEMIR 22 UNITS: 100 INJECTION, SOLUTION SUBCUTANEOUS at 09:07

## 2017-06-22 RX ADMIN — TIZANIDINE 4 MG: 4 TABLET ORAL at 12:06

## 2017-06-22 RX ADMIN — TIZANIDINE 4 MG: 4 TABLET ORAL at 20:47

## 2017-06-22 RX ADMIN — HYDROCODONE BITARTRATE AND ACETAMINOPHEN 1 TABLET: 10; 325 TABLET ORAL at 05:28

## 2017-06-22 RX ADMIN — HYDROXYZINE HYDROCHLORIDE 10 MG: 10 TABLET ORAL at 09:04

## 2017-06-22 RX ADMIN — HYDROCODONE BITARTRATE AND ACETAMINOPHEN 1 TABLET: 10; 325 TABLET ORAL at 14:15

## 2017-06-22 RX ADMIN — HEPARIN SODIUM 5000 UNITS: 5000 INJECTION, SOLUTION INTRAVENOUS; SUBCUTANEOUS at 14:10

## 2017-06-22 RX ADMIN — VANCOMYCIN HYDROCHLORIDE 1250 MG: 5 INJECTION, POWDER, LYOPHILIZED, FOR SOLUTION INTRAVENOUS at 23:52

## 2017-06-22 RX ADMIN — VANCOMYCIN HYDROCHLORIDE 1250 MG: 5 INJECTION, POWDER, LYOPHILIZED, FOR SOLUTION INTRAVENOUS at 05:28

## 2017-06-22 RX ADMIN — HYDROCODONE BITARTRATE AND ACETAMINOPHEN 1 TABLET: 10; 325 TABLET ORAL at 20:55

## 2017-06-22 RX ADMIN — HEPARIN SODIUM 5000 UNITS: 5000 INJECTION, SOLUTION INTRAVENOUS; SUBCUTANEOUS at 21:01

## 2017-06-22 NOTE — PROGRESS NOTES
Nutrition Services    Patient Name:  Reny Elena  YOB: 1960  MRN: [id number]  Admit Date:  6/20/2017      WILL ADD CONSISTENT CARB to regular diet order, and WILL ADD XTRA PROTEIN W/ MEALS, per protocol for healing.    RECOMMEND:  ADDING MVI DAILY to aid in healing.     RD will follow, thank you      Electronically signed by:  Leah Hightower RD  06/22/17 1:36 PM

## 2017-06-22 NOTE — PLAN OF CARE
Problem: Patient Care Overview (Adult)  Goal: Plan of Care Review  Outcome: Ongoing (interventions implemented as appropriate)    06/22/17 0316   Coping/Psychosocial Response Interventions   Plan Of Care Reviewed With patient   Patient Care Overview   Progress no change         Problem: Infection, Risk/Actual (Adult)  Goal: Identify Related Risk Factors and Signs and Symptoms  Outcome: Ongoing (interventions implemented as appropriate)

## 2017-06-22 NOTE — PROGRESS NOTES
Reny Elena 57 y.o.   06/22/17    Subjective: Patient feels well has no complaints  Objective: Vital signs stable no change physical exam with left foot dressed  Vital Signs (last 72 hrs)       06/18 0700  -  06/19 0659 06/19 0700  -  06/20 0659 06/20 0700  -  06/21 0659 06/21 0700  -  06/22 0631   Most Recent    Temp (°F)     97.5 -  98.5    97.9 -  98.4     97.9 (36.6)    Heart Rate     65 -  75    60 -  72     60    Resp     18 -  20 18 -  20     18    BP     174/88 -  (!) 199/102    106/58 -  (!) 190/97     106/58    SpO2 (%)     95 -  99      91     91        Lab Results (last 72 hours)     Procedure Component Value Units Date/Time    CBC & Differential [[id number]] Collected:  06/20/17 2337    Specimen:  Blood Updated:  06/20/17 2346    Narrative:       The following orders were created for panel order CBC & Differential.  Procedure                               Abnormality         Status                     ---------                               -----------         ------                     CBC Auto Differential[[id number]]        Abnormal            Final result                 Please view results for these tests on the individual orders.    CBC Auto Differential [[id number]]  (Abnormal) Collected:  06/20/17 2337    Specimen:  Blood Updated:  06/20/17 2346     WBC 5.70 10*3/mm3      RBC 4.51 10*6/mm3      Hemoglobin 13.1 g/dL      Hematocrit 39.3 %      MCV 87.1 fL      MCH 29.0 pg      MCHC 33.3 g/dL      RDW 13.0 %      RDW-SD 40.7 fl      MPV 10.8 (H) fL      Platelets 149 10*3/mm3      Neutrophil % 54.0 %      Lymphocyte % 31.9 %      Monocyte % 7.4 %      Eosinophil % 6.0 (H) %      Basophil % 0.7 %      Immature Grans % 0.0 %      Neutrophils, Absolute 3.08 10*3/mm3      Lymphocytes, Absolute 1.82 10*3/mm3      Monocytes, Absolute 0.42 10*3/mm3      Eosinophils, Absolute 0.34 10*3/mm3      Basophils, Absolute 0.04 10*3/mm3      Immature Grans, Absolute 0.00 10*3/mm3     Comprehensive Metabolic  Panel [[id number]]  (Abnormal) Collected:  06/20/17 2337    Specimen:  Blood Updated:  06/21/17 0019     Glucose 338 (H) mg/dL      BUN 19 mg/dL      Creatinine 1.30 (H) mg/dL      Sodium 141 mmol/L      Potassium 4.2 mmol/L      Chloride 105 mmol/L      CO2 26.3 mmol/L      Calcium 9.6 mg/dL      Total Protein 7.9 g/dL      Albumin 4.30 g/dL      ALT (SGPT) 29 U/L      AST (SGOT) 23 U/L      Alkaline Phosphatase 111 (H) U/L       Note New Reference Ranges        Total Bilirubin 0.3 mg/dL      eGFR Non African Amer 42 (L) mL/min/1.73      Globulin 3.6 gm/dL      A/G Ratio 1.2 (L) g/dL      BUN/Creatinine Ratio 14.6     Anion Gap 9.7 mmol/L     Osmolality, Calculated [[id number]]  (Normal) Collected:  06/20/17 2337    Specimen:  Blood Updated:  06/21/17 0019     Osmolality Calc 296.8 mOsm/kg     C-reactive Protein [[id number]]  (Normal) Collected:  06/20/17 2337    Specimen:  Blood Updated:  06/21/17 0021     C-Reactive Protein <0.50 mg/dL     POC Glucose Fingerstick [[id number]]  (Abnormal) Collected:  06/21/17 0438    Specimen:  Blood Updated:  06/21/17 0444     Glucose 219 (H) mg/dL     Narrative:       Meter: [id number] : [id number] katie montes    POC Glucose Fingerstick [[id number]]  (Abnormal) Collected:  06/21/17 0753    Specimen:  Blood Updated:  06/21/17 0803     Glucose 229 (H) mg/dL     Narrative:       Meter: [id number] : [id number] Peace Yaneli S    POC Glucose Fingerstick [[id number]]  (Abnormal) Collected:  06/21/17 1140    Specimen:  Blood Updated:  06/21/17 1149     Glucose 294 (H) mg/dL     Narrative:       Meter: [id number] : [id number] Peace Yaneli S    POC Glucose Fingerstick [[id number]]  (Abnormal) Collected:  06/21/17 1704    Specimen:  Blood Updated:  06/21/17 1712     Glucose 331 (H) mg/dL     Narrative:       Meter: [id number] : [id number] Amisha GOMEZ    POC Glucose Fingerstick [[id number]]  (Abnormal) Collected:  06/21/17 2110    Specimen:  Blood Updated:  06/21/17 2136      Glucose 259 (H) mg/dL     Narrative:       Meter: [id number] : [id number] kateryna miller    Basic Metabolic Panel [[id number]]  (Abnormal) Collected:  06/22/17 0125    Specimen:  Blood Updated:  06/22/17 0335     Glucose 113 (H) mg/dL      BUN 18 mg/dL      Creatinine 1.14 mg/dL      Sodium 141 mmol/L      Potassium 4.0 mmol/L      Chloride 105 mmol/L      CO2 28.8 mmol/L      Calcium 9.1 mg/dL      eGFR Non African Amer 49 (L) mL/min/1.73      BUN/Creatinine Ratio 15.8     Anion Gap 7.2 mmol/L     Narrative:       GFR Normal >60  Chronic Kidney Disease <60  Kidney Failure <15    Osmolality, Calculated [[id number]]  (Normal) Collected:  06/22/17 0125    Specimen:  Blood Updated:  06/22/17 0336     Osmolality Calc 284.0 mOsm/kg         Imaging Results (last 24 hours)     ** No results found for the last 24 hours. **        Assessment:Active Problems:    Diabetic foot ulcers     Plan:Patient to have a left foot debridement today and home when okay with Dr. ferguson

## 2017-06-22 NOTE — NURSING NOTE
Dr Morris here. Time ou done  debridment of wound on left outer foot .. Wound culture sent to lab.  Pt tolerated well.

## 2017-06-22 NOTE — PLAN OF CARE
Problem: Infection, Risk/Actual (Adult)  Goal: Identify Related Risk Factors and Signs and Symptoms  Outcome: Ongoing (interventions implemented as appropriate)    06/22/17 1024   Infection, Risk/Actual   Infection, Risk/Actual: Related Risk Factors chronic illness/condition;tissue perfusion altered

## 2017-06-22 NOTE — CONSULTS
PODIATRIC SURGERY CONSULTATION REPORT      Referring Provider: Lorene   Reason for Consultation: Left foot Necrosis 5th MTPJ with redness.       Principal problem: <principal problem not specified>    Subjective .     History of present illness:     Ms. Reny Elena is a 57 y.o. years old female with past medical history significant for DM with Ulcers, OM. Admitted for worsening necrosis left foot 5th MTPJ area with redness failure IV antibiotics. Podiatric surgery consultation was requested for  management.   History taken from: patient. Case was discussed with patient    Review of Systems    Constitutional: no fever, chills and night sweats. No appetite change or unexpected weight change. No fatigue.  Eyes: no eye drainage, itching or redness.  HEENT: no mouth sores, dysphagia or nose bleed.  Respiratory: no for shortness of breath, cough or production of sputum.  Cardiovascular: no chest pain, no palpitations, no orthopnea.  Gastrointestinal: no nausea, vomiting or diarrhea. No abdominal pain, hematemesis or rectal bleeding.  Genitourinary: no dysuria or polyuria.  Hematologic/lymphatic: no lymph node abnormalities, no easy bruising or easy bleeding.  Musculoskeletal: no muscle or joint pain.  Skin: No rash and no itching.  Neurological: no loss of consciousness, no seizure, no headache.  Behavioral/Psych: no depression or suicidal ideation.  Endocrine: no hot flashes.  Immunologic: negative.    Past Medical History    Past Medical History:   Diagnosis Date   • Arthritis    • Depression    • Diabetes mellitus    • Hypertension        Past Surgical History    Past Surgical History:   Procedure Laterality Date   • BACK SURGERY     • CATARACT EXTRACTION      Left    •  SECTION     • DENTAL PROCEDURE     • HYSTERECTOMY     • INCISION AND DRAINAGE LEG Left 4/15/2017    Procedure: INCISION AND DRAINAGE LOWER EXTREMITY;  Surgeon: Basilio Morris MD;  Location: [facility];  Service:    • INCISION AND DRAINAGE  "LEG Left 5/26/2017    Procedure: Irrigation and Debridment abcess diabetic wound left foot with deep culture;  Surgeon: Basilio Morris MD;  Location: [facility];  Service:    • TOE AMPUTATION     • TUBAL ABDOMINAL LIGATION         Family History    Family History   Problem Relation Age of Onset   • Heart disease Mother    • Cancer Mother    • COPD Mother        Social History    Social History   Substance Use Topics   • Smoking status: Never Smoker   • Smokeless tobacco: None   • Alcohol use No       Allergies    Codeine; Latex; Morphine and related; Penicillins; and Sulfa antibiotics    Objective     /70 (BP Location: Left arm, Patient Position: Lying)  Pulse 62  Temp 98.2 °F (36.8 °C) (Oral)   Resp 20  Ht 65\" (165.1 cm)  Wt 145 lb 3.2 oz (65.9 kg)  LMP 05/26/2005  SpO2 91%  BMI 24.16 kg/m2    Temp:  [97.9 °F (36.6 °C)-98.4 °F (36.9 °C)] 98.2 °F (36.8 °C)        Intake/Output Summary (Last 24 hours) at 06/22/17 0907  Last data filed at 06/21/17 1858   Gross per 24 hour   Intake              840 ml   Output                0 ml   Net              840 ml         Physical Exam:     General Appearance:    Alert, cooperative, in no acute distress   Head:    Normocephalic, without obvious abnormality, atraumatic   Eyes:            Lids and lashes normal, conjunctivae and sclerae normal, no   icterus, no pallor, corneas clear, PERRLA   Ears:    Ears appear intact with no abnormalities noted   Throat:   No oral lesions, no thrush, oral mucosa moist   Neck:   No adenopathy, supple, trachea midline, no thyromegaly, no   carotid bruit, no JVD   Back:     No tenderness to percussion or palpation, range of motion   normal   Lungs:     Clear to auscultation,respirations regular, even and unlabored. No wheezing, no ronchi and no crackles.    Heart:    Regular rhythm and normal rate, normal S1 and S2, no            murmur, no gallop, no rub, no click   Chest Wall:    No abnormalities observed   Abdomen:     Normal " bowel sounds, no masses, no organomegaly, soft        non-tender, non-distended, no guarding, no rebound                tenderness   Rectal:     Deferred   Extremities: Necrotic 2cm x 1cm necrotic soft tissue with surrounding erythema, tender.    Pulses:   Decreased Pulses palpable and equal bilaterally   Skin: Ulcer probe subq plantar first MTPJ   Lymph nodes:   No palpable adenopathy   Neurologic:   Awake, alert and oriented x 3. Following commands.       Results:      Results from last 7 days  Lab Units 06/20/17  2337 06/19/17  1200   WBC 10*3/mm3 5.70 4.13*     Lab Results   Component Value Date    NEUTROABS 3.08 06/20/2017         Results from last 7 days  Lab Units 06/22/17  0125   CREATININE mg/dL 1.14         Results from last 7 days  Lab Units 06/20/17  2337 06/19/17  1200   CRP mg/dL <0.50 <0.50       Imaging Results (last 24 hours)     ** No results found for the last 24 hours. **            Results Review:    I have personally reviewed laboratory data, culture results, radiology studies and antimicrobial therapy.    Hospital Medications (active)       Dose Frequency Start End    atenolol (TENORMIN) tablet 50 mg 50 mg 2 Times Daily 6/21/2017     Sig - Route: Take 1 tablet by mouth 2 (Two) Times a Day. - Oral    busPIRone (BUSPAR) tablet 10 mg 10 mg Every 12 Hours Scheduled 6/21/2017     Sig - Route: Take 1 tablet by mouth Every 12 (Twelve) Hours. - Oral    cefTRIAXone (ROCEPHIN) 2 g/100 mL 0.9% NS VTB (AVELINA) 2 g Every 24 Hours 6/21/2017     Sig - Route: Infuse 100 mL into a venous catheter Daily. - Intravenous    cetirizine (zyrTEC) tablet 5 mg 5 mg Daily 6/21/2017     Sig - Route: Take 0.5 tablets by mouth Daily. - Oral    CloNIDine (CATAPRES) tablet 0.1 mg 0.1 mg Every 12 Hours Scheduled 6/21/2017     Sig - Route: Take 1 tablet by mouth Every 12 (Twelve) Hours. - Oral    dextrose (D50W) solution 25 g 25 g Every 15 Minutes PRN 6/21/2017     Sig - Route: Infuse 50 mL into a venous catheter Every 15  (Fifteen) Minutes As Needed for Low Blood Sugar (Blood Sugar Less Than 70, Patient Has IV Access - Unresponsive, NPO or Unable To Safely Swallow). - Intravenous    dextrose (GLUTOSE) oral gel 15 g 15 g Every 15 Minutes PRN 6/21/2017     Sig - Route: Take 15 g by mouth Every 15 (Fifteen) Minutes As Needed for Low Blood Sugar (Blood Sugar Less Than 70, Patient Alert, Is Not NPO & Can Safely Swallow). - Oral    FLUoxetine (PROzac) capsule 20 mg 20 mg Daily 6/21/2017     Sig - Route: Take 1 capsule by mouth Daily. - Oral    gabapentin (NEURONTIN) capsule 800 mg 800 mg Every 8 Hours PRN 6/21/2017     Sig - Route: Take 2 capsules by mouth Every 8 (Eight) Hours As Needed (nerve pain). - Oral    glucagon (human recombinant) (GLUCAGEN DIAGNOSTIC) injection 1 mg 1 mg Every 15 Minutes PRN 6/21/2017     Sig - Route: Inject 1 mg under the skin Every 15 (Fifteen) Minutes As Needed (Blood Glucose Less Than 70 - Patient Without IV Access - Unresponsive, NPO or Unable To Safely Swallow). - Subcutaneous    heparin (porcine) 5000 UNIT/ML injection 5,000 Units 5,000 Units Every 8 Hours Scheduled 6/21/2017     Sig - Route: Inject 1 mL under the skin Every 8 (Eight) Hours. - Subcutaneous    HYDROcodone-acetaminophen (NORCO)  MG per tablet 1 tablet 1 tablet Every 6 Hours PRN 6/21/2017     Sig - Route: Take 1 tablet by mouth Every 6 (Six) Hours As Needed for Moderate Pain (4-6). - Oral    HYDROcodone-acetaminophen (NORCO) 7.5-325 MG per tablet 1 tablet 1 tablet Every 8 Hours PRN 6/21/2017 7/1/2017    Sig - Route: Take 1 tablet by mouth Every 8 (Eight) Hours As Needed for Moderate Pain (4-6). - Oral    hydrOXYzine (ATARAX) tablet 10 mg 10 mg Daily 6/21/2017     Sig - Route: Take 1 tablet by mouth Daily. - Oral    insulin aspart (novoLOG) injection 0-14 Units 0-14 Units 4 Times Daily Before Meals & Nightly 6/21/2017     Sig - Route: Inject 0-14 Units under the skin 4 (Four) Times a Day Before Meals & at Bedtime. - Subcutaneous     insulin detemir (LEVEMIR) injection 22 Units 22 Units Every 12 Hours Scheduled 6/21/2017     Sig - Route: Inject 22 Units under the skin Every 12 (Twelve) Hours. - Subcutaneous    lidocaine (XYLOCAINE) 1 % injection 20 mL 20 mL Once 6/21/2017     Sig - Route: 20 mL by Infiltration route 1 (One) Time. - Infiltration    losartan (COZAAR) tablet 100 mg 100 mg Daily 6/21/2017     Sig - Route: Take 2 tablets by mouth Daily. - Oral    temazepam (RESTORIL) capsule 30 mg 30 mg Nightly PRN 6/21/2017     Sig - Route: Take 2 capsules by mouth At Night As Needed for Sleep. - Oral    tiZANidine (ZANAFLEX) tablet 4 mg 4 mg 4 Times Daily 6/21/2017     Sig - Route: Take 1 tablet by mouth 4 (Four) Times a Day. - Oral    vancomycin (VANCOCIN) 1,250 mg in sodium chloride 0.9 % 250 mL IVPB 1,250 mg Every 18 Hours 6/21/2017     Sig - Route: Infuse 1,250 mg into a venous catheter Every 18 (Eighteen) Hours. - Intravenous    Pharmacy to dose vancomycin (Discontinued)  Continuous PRN 6/21/2017 6/21/2017    Sig - Route: Continuous As Needed for Consult. - Does not apply            PROBLEM LIST:    Patient Active Problem List   Diagnosis   • Cellulitis of foot, left   • Cellulitis and abscess of foot   • Diabetic foot ulcers       Plan  Excisional Debridement left foot necrotic tissue with culture, aseptic packing wound bed. IV Antibioticcs with ID consult. Will follow.       Patients findings and recommendations were discussed with patient and family    Code Status: Full Code    Basilio Morris MD  06/22/17  9:07 AM

## 2017-06-23 ENCOUNTER — DOCUMENTATION (OUTPATIENT)
Dept: PODIATRY | Facility: HOSPITAL | Age: 57
End: 2017-06-23

## 2017-06-23 VITALS
TEMPERATURE: 97.9 F | HEIGHT: 65 IN | OXYGEN SATURATION: 93 % | BODY MASS INDEX: 24.19 KG/M2 | DIASTOLIC BLOOD PRESSURE: 76 MMHG | RESPIRATION RATE: 20 BRPM | SYSTOLIC BLOOD PRESSURE: 158 MMHG | WEIGHT: 145.2 LBS | HEART RATE: 72 BPM

## 2017-06-23 PROBLEM — L97.529 DIABETIC ULCER OF LEFT FOOT ASSOCIATED WITH TYPE 2 DIABETES MELLITUS: Status: ACTIVE | Noted: 2017-06-23

## 2017-06-23 PROBLEM — E11.621 DIABETIC ULCER OF LEFT FOOT ASSOCIATED WITH TYPE 2 DIABETES MELLITUS (HCC): Status: ACTIVE | Noted: 2017-06-23

## 2017-06-23 LAB
GLUCOSE BLDC GLUCOMTR-MCNC: 104 MG/DL (ref 70–130)
GLUCOSE BLDC GLUCOMTR-MCNC: 253 MG/DL (ref 70–130)
GLUCOSE BLDC GLUCOMTR-MCNC: 269 MG/DL (ref 70–130)

## 2017-06-23 PROCEDURE — 63710000001 INSULIN DETEMIR PER 5 UNITS: Performed by: FAMILY MEDICINE

## 2017-06-23 PROCEDURE — G0378 HOSPITAL OBSERVATION PER HR: HCPCS

## 2017-06-23 PROCEDURE — 25010000002 VANCOMYCIN PER 500 MG

## 2017-06-23 PROCEDURE — 25010000002 HEPARIN (PORCINE) PER 1000 UNITS: Performed by: FAMILY MEDICINE

## 2017-06-23 PROCEDURE — 25010000002 CEFTRIAXONE: Performed by: INTERNAL MEDICINE

## 2017-06-23 PROCEDURE — 63710000001 INSULIN ASPART PER 5 UNITS: Performed by: FAMILY MEDICINE

## 2017-06-23 PROCEDURE — 82962 GLUCOSE BLOOD TEST: CPT

## 2017-06-23 RX ADMIN — HYDROCODONE BITARTRATE AND ACETAMINOPHEN 1 TABLET: 10; 325 TABLET ORAL at 03:11

## 2017-06-23 RX ADMIN — HEPARIN SODIUM 5000 UNITS: 5000 INJECTION, SOLUTION INTRAVENOUS; SUBCUTANEOUS at 06:00

## 2017-06-23 RX ADMIN — ATENOLOL 50 MG: 50 TABLET ORAL at 08:14

## 2017-06-23 RX ADMIN — LOSARTAN POTASSIUM 100 MG: 50 TABLET, FILM COATED ORAL at 08:14

## 2017-06-23 RX ADMIN — TIZANIDINE 4 MG: 4 TABLET ORAL at 17:10

## 2017-06-23 RX ADMIN — HYDROXYZINE HYDROCHLORIDE 10 MG: 10 TABLET ORAL at 08:14

## 2017-06-23 RX ADMIN — INSULIN ASPART 8 UNITS: 100 INJECTION, SOLUTION INTRAVENOUS; SUBCUTANEOUS at 17:11

## 2017-06-23 RX ADMIN — CETIRIZINE HYDROCHLORIDE 5 MG: 10 TABLET ORAL at 08:14

## 2017-06-23 RX ADMIN — INSULIN ASPART 8 UNITS: 100 INJECTION, SOLUTION INTRAVENOUS; SUBCUTANEOUS at 12:39

## 2017-06-23 RX ADMIN — VANCOMYCIN HYDROCHLORIDE 1250 MG: 5 INJECTION, POWDER, LYOPHILIZED, FOR SOLUTION INTRAVENOUS at 17:03

## 2017-06-23 RX ADMIN — INSULIN DETEMIR 22 UNITS: 100 INJECTION, SOLUTION SUBCUTANEOUS at 08:10

## 2017-06-23 RX ADMIN — ATENOLOL 50 MG: 50 TABLET ORAL at 17:10

## 2017-06-23 RX ADMIN — FLUOXETINE 20 MG: 20 CAPSULE ORAL at 08:14

## 2017-06-23 RX ADMIN — TIZANIDINE 4 MG: 4 TABLET ORAL at 08:14

## 2017-06-23 RX ADMIN — HYDROCODONE BITARTRATE AND ACETAMINOPHEN 1 TABLET: 10; 325 TABLET ORAL at 09:44

## 2017-06-23 RX ADMIN — CLONIDINE HYDROCHLORIDE 0.1 MG: 0.1 TABLET ORAL at 08:14

## 2017-06-23 RX ADMIN — CEFTRIAXONE 2 G: 2 INJECTION, POWDER, FOR SOLUTION INTRAMUSCULAR; INTRAVENOUS at 10:36

## 2017-06-23 RX ADMIN — HEPARIN SODIUM 5000 UNITS: 5000 INJECTION, SOLUTION INTRAVENOUS; SUBCUTANEOUS at 13:37

## 2017-06-23 RX ADMIN — TIZANIDINE 4 MG: 4 TABLET ORAL at 12:50

## 2017-06-23 RX ADMIN — BUSPIRONE HYDROCHLORIDE 10 MG: 10 TABLET ORAL at 08:14

## 2017-06-23 NOTE — DISCHARGE INSTRUCTIONS
Left foot dressing needs to be changed twice a day.. Clean with saline and pack with gauze and apply a dry dressing.

## 2017-06-23 NOTE — PROGRESS NOTES
"  I have personally seen and examined the patient today and discussed overnight interval progress and pertinent issues with nursing staff.    Subjective    Dr. Morris performed unroofing of ulcer with wound culture from 6/22/2017 so far showing no growth and as stated before we do not believe the patient has active infection but rather mechanical pressure ulcer.  Antibiotic therapy would need to be discontinued upon discharge.    Review of Systems    Constitutional: no fever, chills and night sweats. No appetite change or unexpected weight change. No fatigue.  Eyes: no eye drainage, itching or redness.  HEENT: no mouth sores, dysphagia or nose bleed.  Respiratory: no for shortness of breath, cough or production of sputum.  Cardiovascular: no chest pain, no palpitations, no orthopnea.  Gastrointestinal: no nausea, vomiting or diarrhea. No abdominal pain, hematemesis or rectal bleeding.  Genitourinary: no dysuria or polyuria.  Hematologic/lymphatic: no lymph node abnormalities, no easy bruising or easy bleeding.  Musculoskeletal: no muscle or joint pain.  Skin: No rash and no itching.  Neurological: no loss of consciousness, no seizure, no headache.  Behavioral/Psych: no depression or suicidal ideation.  Endocrine: no hot flashes.  Immunologic: negative.      History taken from: patient chart RN      Vital Signs    /72 (BP Location: Left arm, Patient Position: Lying)  Pulse 72  Temp 97.9 °F (36.6 °C) (Oral)   Resp 20  Ht 65\" (165.1 cm)  Wt 145 lb 3.2 oz (65.9 kg)  LMP 05/26/2005  SpO2 93%  BMI 24.16 kg/m2    Temp:  [97.6 °F (36.4 °C)-98.3 °F (36.8 °C)] 97.9 °F (36.6 °C)      Intake/Output Summary (Last 24 hours) at 06/23/17 1106  Last data filed at 06/23/17 1036   Gross per 24 hour   Intake             1550 ml   Output                0 ml   Net             1550 ml     Intake & Output (last 3 days)       06/20 0701 - 06/21 0700 06/21 0701 - 06/22 0700 06/22 0701 - 06/23 0700 06/23 0701 - 06/24 0700    P.O.  " 840 1560     IV Piggyback   250 100    Total Intake(mL/kg)  840 (12.8) 1810 (27.5) 100 (1.5)    Net   +840 +1810 +100            Unmeasured Urine Occurrence 2 x 5 x 7 x     Unmeasured Stool Occurrence 0 x  1 x         Physical Exam:       General Appearance:  Alert, cooperative, in no acute distress   Head:  Normocephalic, without obvious abnormality, atraumatic   Eyes:      Lids and lashes normal, conjunctivae and sclerae normal, no icterus, no pallor, corneas clear, PERRLA   Ears:  Ears appear intact with no abnormalities noted   Throat: No oral lesions, no thrush, oral mucosa moist   Neck: No adenopathy, supple, trachea midline, no thyromegaly, no carotid bruit, no JVD   Back:  No tenderness to percussion or palpation, range of motion normal   Lungs:  Clear to auscultation,respirations regular, even and unlabored. No wheezing, no ronchi and no crackles.   Heart:  Regular rhythm and normal rate, normal S1 and S2, no murmur, no gallop, no rub, no click   Chest Wall:  No abnormalities observed   Abdomen:  Normal bowel sounds, no masses, no organomegaly, soft non-tender, non-distended, no guarding, no rebound tenderness   Rectal:  Deferred   Extremities: Moves all extremities well, no edema, no cyanosis, no redness   Pulses: Pulses palpable and equal bilaterally   Skin: Left fifth metatarsal ulcer with scab and surrounding erythema. No drainage .   Lymph nodes: No palpable adenopathy   Neurologic: Awake, alert and oriented x 3. Following commands.       Results:      Results from last 7 days  Lab Units 06/20/17  2337 06/19/17  1200   WBC 10*3/mm3 5.70 4.13*     Lab Results   Component Value Date    NEUTROABS 3.08 06/20/2017         Results from last 7 days  Lab Units 06/22/17  0125   CREATININE mg/dL 1.14         Results from last 7 days  Lab Units 06/20/17  2337 06/19/17  1200   CRP mg/dL <0.50 <0.50         Results Review:    I have personally reviewed laboratory data, culture results, radiology studies and  antimicrobial therapy.    Hospital Medications (active)       Dose Frequency Start End    atenolol (TENORMIN) tablet 50 mg 50 mg 2 Times Daily 6/21/2017     Sig - Route: Take 1 tablet by mouth 2 (Two) Times a Day. - Oral    busPIRone (BUSPAR) tablet 10 mg 10 mg Every 12 Hours Scheduled 6/21/2017     Sig - Route: Take 1 tablet by mouth Every 12 (Twelve) Hours. - Oral    cefTRIAXone (ROCEPHIN) 2 g/100 mL 0.9% NS VTB (AVELINA) 2 g Every 24 Hours 6/21/2017     Sig - Route: Infuse 100 mL into a venous catheter Daily. - Intravenous    cetirizine (zyrTEC) tablet 5 mg 5 mg Daily 6/21/2017     Sig - Route: Take 0.5 tablets by mouth Daily. - Oral    CloNIDine (CATAPRES) tablet 0.1 mg 0.1 mg Every 12 Hours Scheduled 6/21/2017     Sig - Route: Take 1 tablet by mouth Every 12 (Twelve) Hours. - Oral    dextrose (D50W) solution 25 g 25 g Every 15 Minutes PRN 6/21/2017     Sig - Route: Infuse 50 mL into a venous catheter Every 15 (Fifteen) Minutes As Needed for Low Blood Sugar (Blood Sugar Less Than 70, Patient Has IV Access - Unresponsive, NPO or Unable To Safely Swallow). - Intravenous    dextrose (GLUTOSE) oral gel 15 g 15 g Every 15 Minutes PRN 6/21/2017     Sig - Route: Take 15 g by mouth Every 15 (Fifteen) Minutes As Needed for Low Blood Sugar (Blood Sugar Less Than 70, Patient Alert, Is Not NPO & Can Safely Swallow). - Oral    FLUoxetine (PROzac) capsule 20 mg 20 mg Daily 6/21/2017     Sig - Route: Take 1 capsule by mouth Daily. - Oral    gabapentin (NEURONTIN) capsule 800 mg 800 mg Every 8 Hours PRN 6/21/2017     Sig - Route: Take 2 capsules by mouth Every 8 (Eight) Hours As Needed (nerve pain). - Oral    glucagon (human recombinant) (GLUCAGEN DIAGNOSTIC) injection 1 mg 1 mg Every 15 Minutes PRN 6/21/2017     Sig - Route: Inject 1 mg under the skin Every 15 (Fifteen) Minutes As Needed (Blood Glucose Less Than 70 - Patient Without IV Access - Unresponsive, NPO or Unable To Safely Swallow). - Subcutaneous    heparin (porcine)  5000 UNIT/ML injection 5,000 Units 5,000 Units Every 8 Hours Scheduled 6/21/2017     Sig - Route: Inject 1 mL under the skin Every 8 (Eight) Hours. - Subcutaneous    HYDROcodone-acetaminophen (NORCO)  MG per tablet 1 tablet 1 tablet Every 6 Hours PRN 6/21/2017     Sig - Route: Take 1 tablet by mouth Every 6 (Six) Hours As Needed for Moderate Pain (4-6). - Oral    HYDROcodone-acetaminophen (NORCO) 7.5-325 MG per tablet 1 tablet 1 tablet Every 8 Hours PRN 6/21/2017 7/1/2017    Sig - Route: Take 1 tablet by mouth Every 8 (Eight) Hours As Needed for Moderate Pain (4-6). - Oral    hydrOXYzine (ATARAX) tablet 10 mg 10 mg Daily 6/21/2017     Sig - Route: Take 1 tablet by mouth Daily. - Oral    insulin aspart (novoLOG) injection 0-14 Units 0-14 Units 4 Times Daily Before Meals & Nightly 6/21/2017     Sig - Route: Inject 0-14 Units under the skin 4 (Four) Times a Day Before Meals & at Bedtime. - Subcutaneous    insulin detemir (LEVEMIR) injection 22 Units 22 Units Every 12 Hours Scheduled 6/21/2017     Sig - Route: Inject 22 Units under the skin Every 12 (Twelve) Hours. - Subcutaneous    lidocaine (XYLOCAINE) 1 % injection 20 mL 20 mL Once 6/21/2017     Sig - Route: 20 mL by Infiltration route 1 (One) Time. - Infiltration    losartan (COZAAR) tablet 100 mg 100 mg Daily 6/21/2017     Sig - Route: Take 2 tablets by mouth Daily. - Oral    temazepam (RESTORIL) capsule 30 mg 30 mg Nightly PRN 6/21/2017     Sig - Route: Take 2 capsules by mouth At Night As Needed for Sleep. - Oral    tiZANidine (ZANAFLEX) tablet 4 mg 4 mg 4 Times Daily 6/21/2017     Sig - Route: Take 1 tablet by mouth 4 (Four) Times a Day. - Oral    vancomycin (VANCOCIN) 1,250 mg in sodium chloride 0.9 % 250 mL IVPB 1,250 mg Every 18 Hours 6/21/2017     Sig - Route: Infuse 1,250 mg into a venous catheter Every 18 (Eighteen) Hours. - Intravenous            Cultures:    Wound Culture   Date Value Ref Range Status   06/22/2017 No growth at 24 hours  Preliminary        PROBLEM LIST:    Patient Active Problem List   Diagnosis   • Cellulitis of foot, left   • Cellulitis and abscess of foot   • Diabetic foot ulcers       Assessment/Plan     ASSESSMENT:    1. Diabetic foot ulcer versus osteomyelitis     PLAN:    Dr. Morris performed unroofing of ulcer with wound culture from 6/22/2017 so far showing no growth and as stated before we do not believe the patient has active infection but rather mechanical pressure ulcer.  Antibiotic therapy would need to be discontinued upon discharge.     At this point patient shows no evidence suggestive of active infection especially in the setting of normal CRP level. Most likely her ulcer is related to a mechanical pressure and would require topical therapy and the relief of the pressure with very close surgical follow-up as she is at very high risk of developing underlying osteomyelitis. For now would recommend for now to continue antibiotic therapy Michael substituted daptomycin with vancomycin and to continue high-dose Rocephin 2 g IV every 24 hours as her regimen is supposed to finish in about a week with already follow-up scheduled with Dr. Maradiaga in July.     Patient can be discharged home from ID standpoint.      Patients findings and recommendations were discussed with patient and nursing staff    Code Status: Full Code     Scribed for Dr. Rubio by Annalise Garcia PA-C.     Annalise Garcia PA-C  06/23/17  11:06 AM    Physician Attestation:    I have personally seen and examined the patient. I reviewed the patient's data including history of present illness, review of systems, physical examination, assessment and treatment plan and agree with findings above. The assessment and plan are my own.  I have reviewed and edited the note above after discussing the findings with Annalise Garcia PA-C.    Ricci Rubio MD  Infectious Diseases  06/23/17  12:38 PM

## 2017-06-23 NOTE — PROGRESS NOTES
Discharge Planning Assessment   [facility]     Patient Name: Reny Elena  MRN: [id number]  Today's Date: 6/23/2017    Admit Date: 6/20/2017       Discharge Plan       06/23/17 1155    Case Management/Social Work Plan    Plan SS spoke to pt. Pt lives with significant other and plans to return home at discharge. [facility] utilizes [facility] [phone] to be contacted at discharge. Pt has a wheelchair and cane. Pt utilizes [facility] [phone] for IV antibiotics. SS to follow.         Discharge Placement     Facility/Agency Request Status Selected? Address Phone Number Fax Number    [facility] [place] Pending - No Request Sent     [address] [facility] [place] [address] [phone]        Expected Discharge Date and Time     Expected Discharge Date Expected Discharge Time    Jun 23, 2017           Jihan Dugan

## 2017-06-23 NOTE — PLAN OF CARE
Problem: Patient Care Overview (Adult)  Goal: Plan of Care Review  Outcome: Ongoing (interventions implemented as appropriate)    06/23/17 0404   Coping/Psychosocial Response Interventions   Plan Of Care Reviewed With patient   Patient Care Overview   Progress no change         Problem: Diabetes, Type 2 (Adult)  Goal: Signs and Symptoms of Listed Potential Problems Will be Absent or Manageable (Diabetes, Type 2)  Outcome: Ongoing (interventions implemented as appropriate)    Problem: Infection, Risk/Actual (Adult)  Goal: Identify Related Risk Factors and Signs and Symptoms  Outcome: Ongoing (interventions implemented as appropriate)

## 2017-06-23 NOTE — DISCHARGE PLACEMENT REQUEST
"Reny Merino (57 y.o. Female)     Date of Birth Social Security Number Address Home Phone MRN    1960  [address] [place] [address] [phone] [id number]    Rastafari Marital Status          Shinto        Admission Date Admission Type Admitting Provider Attending Provider Department, Room/Bed    6/20/17 Emergency Yandel Paulson MD Watts, John Michael, MD [address], [address]/    Discharge Date Discharge Disposition Discharge Destination         Home-Health Care [facility]             Attending Provider: Yandel Paulson MD     Allergies:  Codeine, Latex, Morphine And Related, Penicillins, Sulfa Antibiotics    Isolation:  None   Infection:  None   Code Status:  FULL    Ht:  65\" (165.1 cm)   Wt:  145 lb 3.2 oz (65.9 kg)    Admission Cmt:  None   Principal Problem:  None                Active Insurance as of 6/20/2017     Primary Coverage     Payor Plan Insurance Group Employer/Plan Group    [employer] [place] [employer]ICA[place]      Payor Plan Address Payor Plan Phone Number Effective From Effective To    [address] [address] [phone] 4/12/2017     [place] [id number]       Subscriber Name Subscriber Birth Date Member ID       RENY MERINO 1960 [id number]                 Emergency Contacts      (Rel.) Home Phone Work Phone Mobile Phone    Liza Oliva (Daughter) [phone] -- --            History & Physical     No notes of this type exist for this encounter.        Vital Signs (last 24 hours)       06/22 0700  -  06/23 0659 06/23 0700  -  06/23 1714   Most Recent    Temp (°F) 97.6 -  98.3      97.9     97.9 (36.6)    Heart Rate 59 -  64      72     72    Resp 18 -  20      20     20    BP 93/43 -  169/81    134/72 -  158/76     158/76    SpO2 (%) 93 -  96       93          Intake & Output (last day)       06/22 0701 - 06/23 0700 06/23 0701 - 06/24 0700    P.O. 1560 600    IV Piggyback 250 100    Total Intake(mL/kg) 1810 (27.5) 700 (10.6)    Net " +1810 +700          Unmeasured Urine Occurrence 7 x     Unmeasured Stool Occurrence 1 x         Hospital Medications (active)       Dose Frequency Start End    atenolol (TENORMIN) tablet 50 mg 50 mg 2 Times Daily 6/21/2017     Sig - Route: Take 1 tablet by mouth 2 (Two) Times a Day. - Oral    busPIRone (BUSPAR) tablet 10 mg 10 mg Every 12 Hours Scheduled 6/21/2017     Sig - Route: Take 1 tablet by mouth Every 12 (Twelve) Hours. - Oral    cefTRIAXone (ROCEPHIN) 2 g/100 mL 0.9% NS VTB (AVELINA) 2 g Every 24 Hours 6/21/2017     Sig - Route: Infuse 100 mL into a venous catheter Daily. - Intravenous    cetirizine (zyrTEC) tablet 5 mg 5 mg Daily 6/21/2017     Sig - Route: Take 0.5 tablets by mouth Daily. - Oral    CloNIDine (CATAPRES) tablet 0.1 mg 0.1 mg Every 12 Hours Scheduled 6/21/2017     Sig - Route: Take 1 tablet by mouth Every 12 (Twelve) Hours. - Oral    dextrose (D50W) solution 25 g 25 g Every 15 Minutes PRN 6/21/2017     Sig - Route: Infuse 50 mL into a venous catheter Every 15 (Fifteen) Minutes As Needed for Low Blood Sugar (Blood Sugar Less Than 70, Patient Has IV Access - Unresponsive, NPO or Unable To Safely Swallow). - Intravenous    dextrose (GLUTOSE) oral gel 15 g 15 g Every 15 Minutes PRN 6/21/2017     Sig - Route: Take 15 g by mouth Every 15 (Fifteen) Minutes As Needed for Low Blood Sugar (Blood Sugar Less Than 70, Patient Alert, Is Not NPO & Can Safely Swallow). - Oral    FLUoxetine (PROzac) capsule 20 mg 20 mg Daily 6/21/2017     Sig - Route: Take 1 capsule by mouth Daily. - Oral    gabapentin (NEURONTIN) capsule 800 mg 800 mg Every 8 Hours PRN 6/21/2017     Sig - Route: Take 2 capsules by mouth Every 8 (Eight) Hours As Needed (nerve pain). - Oral    glucagon (human recombinant) (GLUCAGEN DIAGNOSTIC) injection 1 mg 1 mg Every 15 Minutes PRN 6/21/2017     Sig - Route: Inject 1 mg under the skin Every 15 (Fifteen) Minutes As Needed (Blood Glucose Less Than 70 - Patient Without IV Access - Unresponsive, NPO  or Unable To Safely Swallow). - Subcutaneous    heparin (porcine) 5000 UNIT/ML injection 5,000 Units 5,000 Units Every 8 Hours Scheduled 6/21/2017     Sig - Route: Inject 1 mL under the skin Every 8 (Eight) Hours. - Subcutaneous    HYDROcodone-acetaminophen (NORCO)  MG per tablet 1 tablet 1 tablet Every 6 Hours PRN 6/21/2017     Sig - Route: Take 1 tablet by mouth Every 6 (Six) Hours As Needed for Moderate Pain (4-6). - Oral    HYDROcodone-acetaminophen (NORCO) 7.5-325 MG per tablet 1 tablet 1 tablet Every 8 Hours PRN 6/21/2017 7/1/2017    Sig - Route: Take 1 tablet by mouth Every 8 (Eight) Hours As Needed for Moderate Pain (4-6). - Oral    hydrOXYzine (ATARAX) tablet 10 mg 10 mg Daily 6/21/2017     Sig - Route: Take 1 tablet by mouth Daily. - Oral    insulin aspart (novoLOG) injection 0-14 Units 0-14 Units 4 Times Daily Before Meals & Nightly 6/21/2017     Sig - Route: Inject 0-14 Units under the skin 4 (Four) Times a Day Before Meals & at Bedtime. - Subcutaneous    insulin detemir (LEVEMIR) injection 22 Units 22 Units Every 12 Hours Scheduled 6/21/2017     Sig - Route: Inject 22 Units under the skin Every 12 (Twelve) Hours. - Subcutaneous    lidocaine (XYLOCAINE) 1 % injection 20 mL 20 mL Once 6/21/2017     Sig - Route: 20 mL by Infiltration route 1 (One) Time. - Infiltration    losartan (COZAAR) tablet 100 mg 100 mg Daily 6/21/2017     Sig - Route: Take 2 tablets by mouth Daily. - Oral    temazepam (RESTORIL) capsule 30 mg 30 mg Nightly PRN 6/21/2017     Sig - Route: Take 2 capsules by mouth At Night As Needed for Sleep. - Oral    tiZANidine (ZANAFLEX) tablet 4 mg 4 mg 4 Times Daily 6/21/2017     Sig - Route: Take 1 tablet by mouth 4 (Four) Times a Day. - Oral    vancomycin (VANCOCIN) 1,250 mg in sodium chloride 0.9 % 250 mL IVPB 1,250 mg Every 18 Hours 6/21/2017     Sig - Route: Infuse 1,250 mg into a venous catheter Every 18 (Eighteen) Hours. - Intravenous            Lab Results (last 24 hours)      Procedure Component Value Units Date/Time    POC Glucose Fingerstick [[id number]]  (Abnormal) Collected:  06/22/17 1717    Specimen:  Blood Updated:  06/22/17 1723     Glucose 264 (H) mg/dL     Narrative:       Meter: [id number] : [id number] phyllis hallman    POC Glucose Fingerstick [[id number]]  (Abnormal) Collected:  06/22/17 2050    Specimen:  Blood Updated:  06/22/17 2116     Glucose 134 (H) mg/dL     Narrative:       Meter: [id number] : [id number] kateryna floydelle angela    Wound Culture [[id number]]  (Normal) Collected:  06/22/17 0955    Specimen:  Wound from Foot, Left Updated:  06/23/17 0732     Wound Culture No growth at 24 hours     Gram Stain Result Rare (1+) WBCs seen      No organisms seen    POC Glucose Fingerstick [[id number]]  (Normal) Collected:  06/23/17 0726    Specimen:  Blood Updated:  06/23/17 0745     Glucose 104 mg/dL     Narrative:       Meter: [id number] : [id number] Kamaljit Bowensa L    POC Glucose Fingerstick [[id number]]  (Abnormal) Collected:  06/23/17 1203    Specimen:  Blood Updated:  06/23/17 1215     Glucose 253 (H) mg/dL     Narrative:       Meter: [id number] : [id number] Kamaljit Thresa L        Imaging Results (last 24 hours)     ** No results found for the last 24 hours. **        Orders (last 24 hrs)     Start     Ordered    06/23/17 1614  Discharge patient  Once     Comments:  Pharmacy to assist with IV Antibiotic treatment at home.    06/23/17 1614    06/23/17 1600  Change Dressing  2 Times Daily     Comments:  Normal saline wet-to-dry    06/23/17 1551    06/23/17 1216  POC Glucose Fingerstick  Once      06/23/17 1215    06/23/17 1126  Initiate Observation Status  Once      06/23/17 1126    06/23/17 0746  POC Glucose Fingerstick  Once      06/23/17 0745    06/23/17 0000  DAPTOmycin 400 mg in sodium chloride 0.9 % 50 mL  Every 24 Hours,   Status:  Discontinued      06/23/17 1553    06/23/17 0000  cefTRIAXone (ROCEPHIN) 2 g/100 mL 0.9% NS (MBP)  Every 24 Hours,   Status:   Discontinued      06/23/17 1553    06/22/17 2117  POC Glucose Fingerstick  Once      06/22/17 2116    06/22/17 1724  POC Glucose Fingerstick  Once      06/22/17 1723    06/21/17 1715  lidocaine (XYLOCAINE) 1 % injection 20 mL  Once      06/21/17 1641    06/21/17 1300  tiZANidine (ZANAFLEX) tablet 4 mg  4 Times Daily      06/21/17 1146    06/21/17 1300  losartan (COZAAR) tablet 100 mg  Daily      06/21/17 1146    06/21/17 1300  hydrOXYzine (ATARAX) tablet 10 mg  Daily      06/21/17 1146    06/21/17 1300  FLUoxetine (PROzac) capsule 20 mg  Daily      06/21/17 1146    06/21/17 1300  cetirizine (zyrTEC) tablet 5 mg  Daily      06/21/17 1146    06/21/17 1300  busPIRone (BUSPAR) tablet 10 mg  Every 12 Hours Scheduled      06/21/17 1146    06/21/17 1200  vancomycin (VANCOCIN) 1,250 mg in sodium chloride 0.9 % 250 mL IVPB  Every 18 Hours      06/21/17 1010    06/21/17 1145  temazepam (RESTORIL) capsule 30 mg  Nightly PRN      06/21/17 1146    06/21/17 1145  HYDROcodone-acetaminophen (NORCO)  MG per tablet 1 tablet  Every 6 Hours PRN      06/21/17 1146    06/21/17 1145  gabapentin (NEURONTIN) capsule 800 mg  Every 8 Hours PRN      06/21/17 1146    06/21/17 1130  insulin aspart (novoLOG) injection 0-14 Units  4 Times Daily Before Meals & Nightly      06/21/17 0905    06/21/17 1100  POC Glucose Fingerstick  4 Times Daily Before Meals & at Bedtime      06/21/17 0905    06/21/17 1100  insulin detemir (LEVEMIR) injection 22 Units  Every 12 Hours Scheduled      06/21/17 0907    06/21/17 1100  cefTRIAXone (ROCEPHIN) 2 g/100 mL 0.9% NS VTB (AVELINA)  Every 24 Hours      06/21/17 0945    06/21/17 1100  CloNIDine (CATAPRES) tablet 0.1 mg  Every 12 Hours Scheduled      06/21/17 1024    06/21/17 0904  dextrose (GLUTOSE) oral gel 15 g  Every 15 Minutes PRN      06/21/17 0905    06/21/17 0904  dextrose (D50W) solution 25 g  Every 15 Minutes PRN      06/21/17 0905    06/21/17 0904  glucagon (human recombinant) (GLUCAGEN DIAGNOSTIC)  injection 1 mg  Every 15 Minutes PRN      06/21/17 0905    06/21/17 0900  atenolol (TENORMIN) tablet 50 mg  2 Times Daily      06/21/17 0701    06/21/17 0700  heparin (porcine) 5000 UNIT/ML injection 5,000 Units  Every 8 Hours Scheduled      06/21/17 0624    06/21/17 0658  HYDROcodone-acetaminophen (NORCO) 7.5-325 MG per tablet 1 tablet  Every 8 Hours PRN      06/21/17 0659    06/21/17 0000  vancomycin 1,250 mg in sodium chloride 0.9 % 250 mL IVPB  Every 18 Hours,   Status:  Discontinued      06/21/17 1254    05/30/17 0000  DAPTOmycin in sodium chloride 0.9 % 50 mL  Every 24 Hours      06/23/17 1604    05/30/17 0000  cefTRIAXone (ROCEPHIN) 2 g/100 mL 0.9% NS (MBP)  Every 24 Hours      06/23/17 1604    --  CloNIDine (CATAPRES) 0.1 MG tablet  2 Times Daily      06/21/17 0931          Operative/Procedure Notes (most recent note)     No notes of this type exist for this encounter.           Physician Progress Notes (most recent note)      Ricci Rubio MD at 6/23/2017 11:06 AM  Version 2 of 2           I have personally seen and examined the patient today and discussed overnight interval progress and pertinent issues with nursing staff.    Subjective    Dr. Morris performed unroofing of ulcer with wound culture from 6/22/2017 so far showing no growth and as stated before we do not believe the patient has active infection but rather mechanical pressure ulcer.  Antibiotic therapy would need to be discontinued upon discharge.    Review of Systems    Constitutional: no fever, chills and night sweats. No appetite change or unexpected weight change. No fatigue.  Eyes: no eye drainage, itching or redness.  HEENT: no mouth sores, dysphagia or nose bleed.  Respiratory: no for shortness of breath, cough or production of sputum.  Cardiovascular: no chest pain, no palpitations, no orthopnea.  Gastrointestinal: no nausea, vomiting or diarrhea. No abdominal pain, hematemesis or rectal bleeding.  Genitourinary: no dysuria or  "polyuria.  Hematologic/lymphatic: no lymph node abnormalities, no easy bruising or easy bleeding.  Musculoskeletal: no muscle or joint pain.  Skin: No rash and no itching.  Neurological: no loss of consciousness, no seizure, no headache.  Behavioral/Psych: no depression or suicidal ideation.  Endocrine: no hot flashes.  Immunologic: negative.      History taken from: patient chart RN      Vital Signs    /72 (BP Location: Left arm, Patient Position: Lying)  Pulse 72  Temp 97.9 °F (36.6 °C) (Oral)   Resp 20  Ht 65\" (165.1 cm)  Wt 145 lb 3.2 oz (65.9 kg)  LMP 05/26/2005  SpO2 93%  BMI 24.16 kg/m2    Temp:  [97.6 °F (36.4 °C)-98.3 °F (36.8 °C)] 97.9 °F (36.6 °C)      Intake/Output Summary (Last 24 hours) at 06/23/17 1106  Last data filed at 06/23/17 1036   Gross per 24 hour   Intake             1550 ml   Output                0 ml   Net             1550 ml     Intake & Output (last 3 days)       06/20 0701 - 06/21 0700 06/21 0701 - 06/22 0700 06/22 0701 - 06/23 0700 06/23 0701 - 06/24 0700    P.O.  840 1560     IV Piggyback   250 100    Total Intake(mL/kg)  840 (12.8) 1810 (27.5) 100 (1.5)    Net   +840 +1810 +100            Unmeasured Urine Occurrence 2 x 5 x 7 x     Unmeasured Stool Occurrence 0 x  1 x         Physical Exam:       General Appearance:  Alert, cooperative, in no acute distress   Head:  Normocephalic, without obvious abnormality, atraumatic   Eyes:      Lids and lashes normal, conjunctivae and sclerae normal, no icterus, no pallor, corneas clear, PERRLA   Ears:  Ears appear intact with no abnormalities noted   Throat: No oral lesions, no thrush, oral mucosa moist   Neck: No adenopathy, supple, trachea midline, no thyromegaly, no carotid bruit, no JVD   Back:  No tenderness to percussion or palpation, range of motion normal   Lungs:  Clear to auscultation,respirations regular, even and unlabored. No wheezing, no ronchi and no crackles.   Heart:  Regular rhythm and normal rate, normal S1 and " S2, no murmur, no gallop, no rub, no click   Chest Wall:  No abnormalities observed   Abdomen:  Normal bowel sounds, no masses, no organomegaly, soft non-tender, non-distended, no guarding, no rebound tenderness   Rectal:  Deferred   Extremities: Moves all extremities well, no edema, no cyanosis, no redness   Pulses: Pulses palpable and equal bilaterally   Skin: Left fifth metatarsal ulcer with scab and surrounding erythema. No drainage .   Lymph nodes: No palpable adenopathy   Neurologic: Awake, alert and oriented x 3. Following commands.       Results:      Results from last 7 days  Lab Units 06/20/17  2337 06/19/17  1200   WBC 10*3/mm3 5.70 4.13*     Lab Results   Component Value Date    NEUTROABS 3.08 06/20/2017         Results from last 7 days  Lab Units 06/22/17  0125   CREATININE mg/dL 1.14         Results from last 7 days  Lab Units 06/20/17  2337 06/19/17  1200   CRP mg/dL <0.50 <0.50         Results Review:    I have personally reviewed laboratory data, culture results, radiology studies and antimicrobial therapy.    Hospital Medications (active)       Dose Frequency Start End    atenolol (TENORMIN) tablet 50 mg 50 mg 2 Times Daily 6/21/2017     Sig - Route: Take 1 tablet by mouth 2 (Two) Times a Day. - Oral    busPIRone (BUSPAR) tablet 10 mg 10 mg Every 12 Hours Scheduled 6/21/2017     Sig - Route: Take 1 tablet by mouth Every 12 (Twelve) Hours. - Oral    cefTRIAXone (ROCEPHIN) 2 g/100 mL 0.9% NS VTB (AVELINA) 2 g Every 24 Hours 6/21/2017     Sig - Route: Infuse 100 mL into a venous catheter Daily. - Intravenous    cetirizine (zyrTEC) tablet 5 mg 5 mg Daily 6/21/2017     Sig - Route: Take 0.5 tablets by mouth Daily. - Oral    CloNIDine (CATAPRES) tablet 0.1 mg 0.1 mg Every 12 Hours Scheduled 6/21/2017     Sig - Route: Take 1 tablet by mouth Every 12 (Twelve) Hours. - Oral    dextrose (D50W) solution 25 g 25 g Every 15 Minutes PRN 6/21/2017     Sig - Route: Infuse 50 mL into a venous catheter Every 15  (Fifteen) Minutes As Needed for Low Blood Sugar (Blood Sugar Less Than 70, Patient Has IV Access - Unresponsive, NPO or Unable To Safely Swallow). - Intravenous    dextrose (GLUTOSE) oral gel 15 g 15 g Every 15 Minutes PRN 6/21/2017     Sig - Route: Take 15 g by mouth Every 15 (Fifteen) Minutes As Needed for Low Blood Sugar (Blood Sugar Less Than 70, Patient Alert, Is Not NPO & Can Safely Swallow). - Oral    FLUoxetine (PROzac) capsule 20 mg 20 mg Daily 6/21/2017     Sig - Route: Take 1 capsule by mouth Daily. - Oral    gabapentin (NEURONTIN) capsule 800 mg 800 mg Every 8 Hours PRN 6/21/2017     Sig - Route: Take 2 capsules by mouth Every 8 (Eight) Hours As Needed (nerve pain). - Oral    glucagon (human recombinant) (GLUCAGEN DIAGNOSTIC) injection 1 mg 1 mg Every 15 Minutes PRN 6/21/2017     Sig - Route: Inject 1 mg under the skin Every 15 (Fifteen) Minutes As Needed (Blood Glucose Less Than 70 - Patient Without IV Access - Unresponsive, NPO or Unable To Safely Swallow). - Subcutaneous    heparin (porcine) 5000 UNIT/ML injection 5,000 Units 5,000 Units Every 8 Hours Scheduled 6/21/2017     Sig - Route: Inject 1 mL under the skin Every 8 (Eight) Hours. - Subcutaneous    HYDROcodone-acetaminophen (NORCO)  MG per tablet 1 tablet 1 tablet Every 6 Hours PRN 6/21/2017     Sig - Route: Take 1 tablet by mouth Every 6 (Six) Hours As Needed for Moderate Pain (4-6). - Oral    HYDROcodone-acetaminophen (NORCO) 7.5-325 MG per tablet 1 tablet 1 tablet Every 8 Hours PRN 6/21/2017 7/1/2017    Sig - Route: Take 1 tablet by mouth Every 8 (Eight) Hours As Needed for Moderate Pain (4-6). - Oral    hydrOXYzine (ATARAX) tablet 10 mg 10 mg Daily 6/21/2017     Sig - Route: Take 1 tablet by mouth Daily. - Oral    insulin aspart (novoLOG) injection 0-14 Units 0-14 Units 4 Times Daily Before Meals & Nightly 6/21/2017     Sig - Route: Inject 0-14 Units under the skin 4 (Four) Times a Day Before Meals & at Bedtime. - Subcutaneous     insulin detemir (LEVEMIR) injection 22 Units 22 Units Every 12 Hours Scheduled 6/21/2017     Sig - Route: Inject 22 Units under the skin Every 12 (Twelve) Hours. - Subcutaneous    lidocaine (XYLOCAINE) 1 % injection 20 mL 20 mL Once 6/21/2017     Sig - Route: 20 mL by Infiltration route 1 (One) Time. - Infiltration    losartan (COZAAR) tablet 100 mg 100 mg Daily 6/21/2017     Sig - Route: Take 2 tablets by mouth Daily. - Oral    temazepam (RESTORIL) capsule 30 mg 30 mg Nightly PRN 6/21/2017     Sig - Route: Take 2 capsules by mouth At Night As Needed for Sleep. - Oral    tiZANidine (ZANAFLEX) tablet 4 mg 4 mg 4 Times Daily 6/21/2017     Sig - Route: Take 1 tablet by mouth 4 (Four) Times a Day. - Oral    vancomycin (VANCOCIN) 1,250 mg in sodium chloride 0.9 % 250 mL IVPB 1,250 mg Every 18 Hours 6/21/2017     Sig - Route: Infuse 1,250 mg into a venous catheter Every 18 (Eighteen) Hours. - Intravenous            Cultures:    Wound Culture   Date Value Ref Range Status   06/22/2017 No growth at 24 hours  Preliminary       PROBLEM LIST:    Patient Active Problem List   Diagnosis   • Cellulitis of foot, left   • Cellulitis and abscess of foot   • Diabetic foot ulcers       Assessment/Plan     ASSESSMENT:    1. Diabetic foot ulcer versus osteomyelitis     PLAN:    Dr. Morris performed unroofing of ulcer with wound culture from 6/22/2017 so far showing no growth and as stated before we do not believe the patient has active infection but rather mechanical pressure ulcer.  Antibiotic therapy would need to be discontinued upon discharge.     At this point patient shows no evidence suggestive of active infection especially in the setting of normal CRP level. Most likely her ulcer is related to a mechanical pressure and would require topical therapy and the relief of the pressure with very close surgical follow-up as she is at very high risk of developing underlying osteomyelitis. For now would recommend for now to continue  antibiotic therapy [place] substituted daptomycin with vancomycin and to continue high-dose Rocephin 2 g IV every 24 hours as her regimen is supposed to finish in about a week with already follow-up scheduled with Dr. Maradiaga in July.     Patient can be discharged home from ID standpoint.      Patients findings and recommendations were discussed with patient and nursing staff    Code Status: Full Code     Scribed for Dr. Rubio by Annalise Garcia PA-C.     Annalise Garcia PA-C  06/23/17  11:06 AM    Physician Attestation:    I have personally seen and examined the patient. I reviewed the patient's data including history of present illness, review of systems, physical examination, assessment and treatment plan and agree with findings above. The assessment and plan are my own.  I have reviewed and edited the note above after discussing the findings with Annalise Garcia PA-C.    Ricci Rubio MD  Infectious Diseases  06/23/17  12:38 PM       Electronically signed by Ricci Rubio MD at 6/23/2017 12:38 PM      Annalise Garcia PA-C at 6/23/2017 11:06 AM  Version 1 of 2           I have personally seen and examined the patient today and discussed overnight interval progress and pertinent issues with nursing staff.    Subjective    Dr. Morris performed unroofing of ulcer with wound culture from 6/22/2017 so far showing no growth and as stated before we do not believe the patient has active infection but rather mechanical pressure ulcer.  Antibiotic therapy would need to be discontinued upon discharge.    Review of Systems    Constitutional: no fever, chills and night sweats. No appetite change or unexpected weight change. No fatigue.  Eyes: no eye drainage, itching or redness.  HEENT: no mouth sores, dysphagia or nose bleed.  Respiratory: no for shortness of breath, cough or production of sputum.  Cardiovascular: no chest pain, no palpitations, no orthopnea.  Gastrointestinal: no nausea, vomiting or diarrhea. No  "abdominal pain, hematemesis or rectal bleeding.  Genitourinary: no dysuria or polyuria.  Hematologic/lymphatic: no lymph node abnormalities, no easy bruising or easy bleeding.  Musculoskeletal: no muscle or joint pain.  Skin: No rash and no itching.  Neurological: no loss of consciousness, no seizure, no headache.  Behavioral/Psych: no depression or suicidal ideation.  Endocrine: no hot flashes.  Immunologic: negative.      History taken from: patient chart RN      Vital Signs    /72 (BP Location: Left arm, Patient Position: Lying)  Pulse 72  Temp 97.9 °F (36.6 °C) (Oral)   Resp 20  Ht 65\" (165.1 cm)  Wt 145 lb 3.2 oz (65.9 kg)  LMP 05/26/2005  SpO2 93%  BMI 24.16 kg/m2    Temp:  [97.6 °F (36.4 °C)-98.3 °F (36.8 °C)] 97.9 °F (36.6 °C)      Intake/Output Summary (Last 24 hours) at 06/23/17 1106  Last data filed at 06/23/17 1036   Gross per 24 hour   Intake             1550 ml   Output                0 ml   Net             1550 ml     Intake & Output (last 3 days)       06/20 0701 - 06/21 0700 06/21 0701 - 06/22 0700 06/22 0701 - 06/23 0700 06/23 0701 - 06/24 0700    P.O.  840 1560     IV Piggyback   250 100    Total Intake(mL/kg)  840 (12.8) 1810 (27.5) 100 (1.5)    Net   +840 +1810 +100            Unmeasured Urine Occurrence 2 x 5 x 7 x     Unmeasured Stool Occurrence 0 x  1 x         Physical Exam:       General Appearance:  Alert, cooperative, in no acute distress   Head:  Normocephalic, without obvious abnormality, atraumatic   Eyes:      Lids and lashes normal, conjunctivae and sclerae normal, no icterus, no pallor, corneas clear, PERRLA   Ears:  Ears appear intact with no abnormalities noted   Throat: No oral lesions, no thrush, oral mucosa moist   Neck: No adenopathy, supple, trachea midline, no thyromegaly, no carotid bruit, no JVD   Back:  No tenderness to percussion or palpation, range of motion normal   Lungs:  Clear to auscultation,respirations regular, even and unlabored. No wheezing, no " ronchi and no crackles.   Heart:  Regular rhythm and normal rate, normal S1 and S2, no murmur, no gallop, no rub, no click   Chest Wall:  No abnormalities observed   Abdomen:  Normal bowel sounds, no masses, no organomegaly, soft non-tender, non-distended, no guarding, no rebound tenderness   Rectal:  Deferred   Extremities: Moves all extremities well, no edema, no cyanosis, no redness   Pulses: Pulses palpable and equal bilaterally   Skin: Left fifth metatarsal ulcer with scab and surrounding erythema. No drainage .   Lymph nodes: No palpable adenopathy   Neurologic: Awake, alert and oriented x 3. Following commands.       Results:      Results from last 7 days  Lab Units 06/20/17  2337 06/19/17  1200   WBC 10*3/mm3 5.70 4.13*     Lab Results   Component Value Date    NEUTROABS 3.08 06/20/2017         Results from last 7 days  Lab Units 06/22/17  0125   CREATININE mg/dL 1.14         Results from last 7 days  Lab Units 06/20/17  2337 06/19/17  1200   CRP mg/dL <0.50 <0.50         Results Review:    I have personally reviewed laboratory data, culture results, radiology studies and antimicrobial therapy.    Hospital Medications (active)       Dose Frequency Start End    atenolol (TENORMIN) tablet 50 mg 50 mg 2 Times Daily 6/21/2017     Sig - Route: Take 1 tablet by mouth 2 (Two) Times a Day. - Oral    busPIRone (BUSPAR) tablet 10 mg 10 mg Every 12 Hours Scheduled 6/21/2017     Sig - Route: Take 1 tablet by mouth Every 12 (Twelve) Hours. - Oral    cefTRIAXone (ROCEPHIN) 2 g/100 mL 0.9% NS VTB (AVELINA) 2 g Every 24 Hours 6/21/2017     Sig - Route: Infuse 100 mL into a venous catheter Daily. - Intravenous    cetirizine (zyrTEC) tablet 5 mg 5 mg Daily 6/21/2017     Sig - Route: Take 0.5 tablets by mouth Daily. - Oral    CloNIDine (CATAPRES) tablet 0.1 mg 0.1 mg Every 12 Hours Scheduled 6/21/2017     Sig - Route: Take 1 tablet by mouth Every 12 (Twelve) Hours. - Oral    dextrose (D50W) solution 25 g 25 g Every 15 Minutes PRN  6/21/2017     Sig - Route: Infuse 50 mL into a venous catheter Every 15 (Fifteen) Minutes As Needed for Low Blood Sugar (Blood Sugar Less Than 70, Patient Has IV Access - Unresponsive, NPO or Unable To Safely Swallow). - Intravenous    dextrose (GLUTOSE) oral gel 15 g 15 g Every 15 Minutes PRN 6/21/2017     Sig - Route: Take 15 g by mouth Every 15 (Fifteen) Minutes As Needed for Low Blood Sugar (Blood Sugar Less Than 70, Patient Alert, Is Not NPO & Can Safely Swallow). - Oral    FLUoxetine (PROzac) capsule 20 mg 20 mg Daily 6/21/2017     Sig - Route: Take 1 capsule by mouth Daily. - Oral    gabapentin (NEURONTIN) capsule 800 mg 800 mg Every 8 Hours PRN 6/21/2017     Sig - Route: Take 2 capsules by mouth Every 8 (Eight) Hours As Needed (nerve pain). - Oral    glucagon (human recombinant) (GLUCAGEN DIAGNOSTIC) injection 1 mg 1 mg Every 15 Minutes PRN 6/21/2017     Sig - Route: Inject 1 mg under the skin Every 15 (Fifteen) Minutes As Needed (Blood Glucose Less Than 70 - Patient Without IV Access - Unresponsive, NPO or Unable To Safely Swallow). - Subcutaneous    heparin (porcine) 5000 UNIT/ML injection 5,000 Units 5,000 Units Every 8 Hours Scheduled 6/21/2017     Sig - Route: Inject 1 mL under the skin Every 8 (Eight) Hours. - Subcutaneous    HYDROcodone-acetaminophen (NORCO)  MG per tablet 1 tablet 1 tablet Every 6 Hours PRN 6/21/2017     Sig - Route: Take 1 tablet by mouth Every 6 (Six) Hours As Needed for Moderate Pain (4-6). - Oral    HYDROcodone-acetaminophen (NORCO) 7.5-325 MG per tablet 1 tablet 1 tablet Every 8 Hours PRN 6/21/2017 7/1/2017    Sig - Route: Take 1 tablet by mouth Every 8 (Eight) Hours As Needed for Moderate Pain (4-6). - Oral    hydrOXYzine (ATARAX) tablet 10 mg 10 mg Daily 6/21/2017     Sig - Route: Take 1 tablet by mouth Daily. - Oral    insulin aspart (novoLOG) injection 0-14 Units 0-14 Units 4 Times Daily Before Meals & Nightly 6/21/2017     Sig - Route: Inject 0-14 Units under the skin  4 (Four) Times a Day Before Meals & at Bedtime. - Subcutaneous    insulin detemir (LEVEMIR) injection 22 Units 22 Units Every 12 Hours Scheduled 6/21/2017     Sig - Route: Inject 22 Units under the skin Every 12 (Twelve) Hours. - Subcutaneous    lidocaine (XYLOCAINE) 1 % injection 20 mL 20 mL Once 6/21/2017     Sig - Route: 20 mL by Infiltration route 1 (One) Time. - Infiltration    losartan (COZAAR) tablet 100 mg 100 mg Daily 6/21/2017     Sig - Route: Take 2 tablets by mouth Daily. - Oral    temazepam (RESTORIL) capsule 30 mg 30 mg Nightly PRN 6/21/2017     Sig - Route: Take 2 capsules by mouth At Night As Needed for Sleep. - Oral    tiZANidine (ZANAFLEX) tablet 4 mg 4 mg 4 Times Daily 6/21/2017     Sig - Route: Take 1 tablet by mouth 4 (Four) Times a Day. - Oral    vancomycin (VANCOCIN) 1,250 mg in sodium chloride 0.9 % 250 mL IVPB 1,250 mg Every 18 Hours 6/21/2017     Sig - Route: Infuse 1,250 mg into a venous catheter Every 18 (Eighteen) Hours. - Intravenous            Cultures:    Wound Culture   Date Value Ref Range Status   06/22/2017 No growth at 24 hours  Preliminary       PROBLEM LIST:    Patient Active Problem List   Diagnosis   • Cellulitis of foot, left   • Cellulitis and abscess of foot   • Diabetic foot ulcers       Assessment/Plan     ASSESSMENT:    1. Diabetic foot ulcer versus osteomyelitis     PLAN:    Dr. Morris performed unroofing of ulcer with wound culture from 6/22/2017 so far showing no growth and as stated before we do not believe the patient has active infection but rather mechanical pressure ulcer.  Antibiotic therapy would need to be discontinued upon discharge.     At this point patient shows no evidence suggestive of active infection especially in the setting of normal CRP level. Most likely her ulcer is related to a mechanical pressure and would require topical therapy and the relief of the pressure with very close surgical follow-up as she is at very high risk of developing underlying  osteomyelitis. For now would recommend for now to continue antibiotic therapy [place] substituted daptomycin with vancomycin and to continue high-dose Rocephin 2 g IV every 24 hours as her regimen is supposed to finish in about a week with already follow-up scheduled with Dr. Maradiaga in July.     Patient can be discharged home from ID standpoint.      Patients findings and recommendations were discussed with patient and nursing staff    Code Status: Full Code     Scribed for Dr. Rubio by Annalise Garcia PA-C.     Annalise Garcia PA-C  06/23/17  11:06 AM       Electronically signed by Annalise Garcia PA-C at 6/23/2017 11:10 AM           Consult Notes (most recent note)      Basilio Morris MD at 6/22/2017  9:06 AM  Version 1 of 1     Consult Orders:    1. Inpatient Consult to Podiatry [[id number]] ordered by ESSENCE Quintanilla at 06/21/17 0029                  PODIATRIC SURGERY CONSULTATION REPORT      Referring Provider: Lorene   Reason for Consultation: Left foot Necrosis 5th MTPJ with redness.       Principal problem: <principal problem not specified>    Subjective .     History of present illness:     Ms. Reny Elena is a 57 y.o. years old female with past medical history significant for DM with Ulcers, OM. Admitted for worsening necrosis left foot 5th MTPJ area with redness failure IV antibiotics. Podiatric surgery consultation was requested for  management.   History taken from: patient. Case was discussed with patient    Review of Systems    Constitutional: no fever, chills and night sweats. No appetite change or unexpected weight change. No fatigue.  Eyes: no eye drainage, itching or redness.  HEENT: no mouth sores, dysphagia or nose bleed.  Respiratory: no for shortness of breath, cough or production of sputum.  Cardiovascular: no chest pain, no palpitations, no orthopnea.  Gastrointestinal: no nausea, vomiting or diarrhea. No abdominal pain, hematemesis or rectal bleeding.  Genitourinary: no dysuria or  "polyuria.  Hematologic/lymphatic: no lymph node abnormalities, no easy bruising or easy bleeding.  Musculoskeletal: no muscle or joint pain.  Skin: No rash and no itching.  Neurological: no loss of consciousness, no seizure, no headache.  Behavioral/Psych: no depression or suicidal ideation.  Endocrine: no hot flashes.  Immunologic: negative.    Past Medical History    Past Medical History:   Diagnosis Date   • Arthritis    • Depression    • Diabetes mellitus    • Hypertension        Past Surgical History    Past Surgical History:   Procedure Laterality Date   • BACK SURGERY     • CATARACT EXTRACTION      Left    •  SECTION     • DENTAL PROCEDURE     • HYSTERECTOMY     • INCISION AND DRAINAGE LEG Left 4/15/2017    Procedure: INCISION AND DRAINAGE LOWER EXTREMITY;  Surgeon: Basilio Morris MD;  Location: [facility];  Service:    • INCISION AND DRAINAGE LEG Left 2017    Procedure: Irrigation and Debridment abcess diabetic wound left foot with deep culture;  Surgeon: Basilio Morris MD;  Location: [facility];  Service:    • TOE AMPUTATION     • TUBAL ABDOMINAL LIGATION         Family History    Family History   Problem Relation Age of Onset   • Heart disease Mother    • Cancer Mother    • COPD Mother        Social History    Social History   Substance Use Topics   • Smoking status: Never Smoker   • Smokeless tobacco: None   • Alcohol use No       Allergies    Codeine; Latex; Morphine and related; Penicillins; and Sulfa antibiotics    Objective     /70 (BP Location: Left arm, Patient Position: Lying)  Pulse 62  Temp 98.2 °F (36.8 °C) (Oral)   Resp 20  Ht 65\" (165.1 cm)  Wt 145 lb 3.2 oz (65.9 kg)  LMP 2005  SpO2 91%  BMI 24.16 kg/m2    Temp:  [97.9 °F (36.6 °C)-98.4 °F (36.9 °C)] 98.2 °F (36.8 °C)        Intake/Output Summary (Last 24 hours) at 17 0907  Last data filed at 17 1858   Gross per 24 hour   Intake              840 ml   Output                0 ml   Net              " 840 ml         Physical Exam:     General Appearance:    Alert, cooperative, in no acute distress   Head:    Normocephalic, without obvious abnormality, atraumatic   Eyes:            Lids and lashes normal, conjunctivae and sclerae normal, no   icterus, no pallor, corneas clear, PERRLA   Ears:    Ears appear intact with no abnormalities noted   Throat:   No oral lesions, no thrush, oral mucosa moist   Neck:   No adenopathy, supple, trachea midline, no thyromegaly, no   carotid bruit, no JVD   Back:     No tenderness to percussion or palpation, range of motion   normal   Lungs:     Clear to auscultation,respirations regular, even and unlabored. No wheezing, no ronchi and no crackles.    Heart:    Regular rhythm and normal rate, normal S1 and S2, no            murmur, no gallop, no rub, no click   Chest Wall:    No abnormalities observed   Abdomen:     Normal bowel sounds, no masses, no organomegaly, soft        non-tender, non-distended, no guarding, no rebound                tenderness   Rectal:     Deferred   Extremities: Necrotic 2cm x 1cm necrotic soft tissue with surrounding erythema, tender.    Pulses:   Decreased Pulses palpable and equal bilaterally   Skin: Ulcer probe subq plantar first MTPJ   Lymph nodes:   No palpable adenopathy   Neurologic:   Awake, alert and oriented x 3. Following commands.       Results:      Results from last 7 days  Lab Units 06/20/17  2337 06/19/17  1200   WBC 10*3/mm3 5.70 4.13*     Lab Results   Component Value Date    NEUTROABS 3.08 06/20/2017         Results from last 7 days  Lab Units 06/22/17  0125   CREATININE mg/dL 1.14         Results from last 7 days  Lab Units 06/20/17  2337 06/19/17  1200   CRP mg/dL <0.50 <0.50       Imaging Results (last 24 hours)     ** No results found for the last 24 hours. **            Results Review:    I have personally reviewed laboratory data, culture results, radiology studies and antimicrobial therapy.    Hospital Medications (active)        Dose Frequency Start End    atenolol (TENORMIN) tablet 50 mg 50 mg 2 Times Daily 6/21/2017     Sig - Route: Take 1 tablet by mouth 2 (Two) Times a Day. - Oral    busPIRone (BUSPAR) tablet 10 mg 10 mg Every 12 Hours Scheduled 6/21/2017     Sig - Route: Take 1 tablet by mouth Every 12 (Twelve) Hours. - Oral    cefTRIAXone (ROCEPHIN) 2 g/100 mL 0.9% NS VTB (AVELINA) 2 g Every 24 Hours 6/21/2017     Sig - Route: Infuse 100 mL into a venous catheter Daily. - Intravenous    cetirizine (zyrTEC) tablet 5 mg 5 mg Daily 6/21/2017     Sig - Route: Take 0.5 tablets by mouth Daily. - Oral    CloNIDine (CATAPRES) tablet 0.1 mg 0.1 mg Every 12 Hours Scheduled 6/21/2017     Sig - Route: Take 1 tablet by mouth Every 12 (Twelve) Hours. - Oral    dextrose (D50W) solution 25 g 25 g Every 15 Minutes PRN 6/21/2017     Sig - Route: Infuse 50 mL into a venous catheter Every 15 (Fifteen) Minutes As Needed for Low Blood Sugar (Blood Sugar Less Than 70, Patient Has IV Access - Unresponsive, NPO or Unable To Safely Swallow). - Intravenous    dextrose (GLUTOSE) oral gel 15 g 15 g Every 15 Minutes PRN 6/21/2017     Sig - Route: Take 15 g by mouth Every 15 (Fifteen) Minutes As Needed for Low Blood Sugar (Blood Sugar Less Than 70, Patient Alert, Is Not NPO & Can Safely Swallow). - Oral    FLUoxetine (PROzac) capsule 20 mg 20 mg Daily 6/21/2017     Sig - Route: Take 1 capsule by mouth Daily. - Oral    gabapentin (NEURONTIN) capsule 800 mg 800 mg Every 8 Hours PRN 6/21/2017     Sig - Route: Take 2 capsules by mouth Every 8 (Eight) Hours As Needed (nerve pain). - Oral    glucagon (human recombinant) (GLUCAGEN DIAGNOSTIC) injection 1 mg 1 mg Every 15 Minutes PRN 6/21/2017     Sig - Route: Inject 1 mg under the skin Every 15 (Fifteen) Minutes As Needed (Blood Glucose Less Than 70 - Patient Without IV Access - Unresponsive, NPO or Unable To Safely Swallow). - Subcutaneous    heparin (porcine) 5000 UNIT/ML injection 5,000 Units 5,000 Units Every 8 Hours  Scheduled 6/21/2017     Sig - Route: Inject 1 mL under the skin Every 8 (Eight) Hours. - Subcutaneous    HYDROcodone-acetaminophen (NORCO)  MG per tablet 1 tablet 1 tablet Every 6 Hours PRN 6/21/2017     Sig - Route: Take 1 tablet by mouth Every 6 (Six) Hours As Needed for Moderate Pain (4-6). - Oral    HYDROcodone-acetaminophen (NORCO) 7.5-325 MG per tablet 1 tablet 1 tablet Every 8 Hours PRN 6/21/2017 7/1/2017    Sig - Route: Take 1 tablet by mouth Every 8 (Eight) Hours As Needed for Moderate Pain (4-6). - Oral    hydrOXYzine (ATARAX) tablet 10 mg 10 mg Daily 6/21/2017     Sig - Route: Take 1 tablet by mouth Daily. - Oral    insulin aspart (novoLOG) injection 0-14 Units 0-14 Units 4 Times Daily Before Meals & Nightly 6/21/2017     Sig - Route: Inject 0-14 Units under the skin 4 (Four) Times a Day Before Meals & at Bedtime. - Subcutaneous    insulin detemir (LEVEMIR) injection 22 Units 22 Units Every 12 Hours Scheduled 6/21/2017     Sig - Route: Inject 22 Units under the skin Every 12 (Twelve) Hours. - Subcutaneous    lidocaine (XYLOCAINE) 1 % injection 20 mL 20 mL Once 6/21/2017     Sig - Route: 20 mL by Infiltration route 1 (One) Time. - Infiltration    losartan (COZAAR) tablet 100 mg 100 mg Daily 6/21/2017     Sig - Route: Take 2 tablets by mouth Daily. - Oral    temazepam (RESTORIL) capsule 30 mg 30 mg Nightly PRN 6/21/2017     Sig - Route: Take 2 capsules by mouth At Night As Needed for Sleep. - Oral    tiZANidine (ZANAFLEX) tablet 4 mg 4 mg 4 Times Daily 6/21/2017     Sig - Route: Take 1 tablet by mouth 4 (Four) Times a Day. - Oral    vancomycin (VANCOCIN) 1,250 mg in sodium chloride 0.9 % 250 mL IVPB 1,250 mg Every 18 Hours 6/21/2017     Sig - Route: Infuse 1,250 mg into a venous catheter Every 18 (Eighteen) Hours. - Intravenous    Pharmacy to dose vancomycin (Discontinued)  Continuous PRN 6/21/2017 6/21/2017    Sig - Route: Continuous As Needed for Consult. - Does not apply            PROBLEM  LIST:    Patient Active Problem List   Diagnosis   • Cellulitis of foot, left   • Cellulitis and abscess of foot   • Diabetic foot ulcers       Plan  Excisional Debridement left foot necrotic tissue with culture, aseptic packing wound bed. IV Antibioticcs with ID consult. Will follow.       Patients findings and recommendations were discussed with patient and family    Code Status: Full Code    Basilio Morris MD  06/22/17  9:07 AM       Electronically signed by Basilio Morris MD at 6/22/2017  9:10 AM           Nutrition Notes (most recent note)      Leah Hightower RD at 6/22/2017  1:36 PM  Version 1 of 1         Nutrition Services    Patient Name:  Reny Elena  YOB: 1960  MRN: [id number]  Admit Date:  6/20/2017      WILL ADD CONSISTENT CARB to regular diet order, and WILL ADD XTRA PROTEIN W/ MEALS, per protocol for healing.    RECOMMEND:  ADDING MVI DAILY to aid in healing.     RD will follow, thank you      Electronically signed by:  Leah Hightower RD  06/22/17 1:36 PM      Electronically signed by Leah Hightower RD at 6/22/2017  1:38 PM        Physical Therapy Notes (most recent note)     No notes of this type exist for this encounter.        Occupational Therapy Notes (most recent note)     No notes of this type exist for this encounter.        Speech Language Pathology Notes (most recent note)     No notes of this type exist for this encounter.        Respiratory Therapy Notes (most recent note)     No notes of this type exist for this encounter.            Discharge Summary     No notes of this type exist for this encounter.        Discharge Order     Start     Ordered    06/23/17 1614  Discharge patient  Once     Comments:  Pharmacy to assist with IV Antibiotic treatment at home.   Expected Discharge Date:  06/23/17    Dis[place] Dis[place] [facility] [place] which facility the patient is currently admitted if they are being discharged to another facility or unit.:    [facility]       Question:  Please choose which facility the patient is currently admitted if they are being discharged to another facility or unit.  Answer:  [facility]    06/23/17 1614    06/21/17 1254  Discharge patient  Once,   Status:  Canceled     Expected Discharge Date:  06/21/17    Discharge Disposition:  Home or Self Care        06/21/17 1250

## 2017-06-23 NOTE — PROGRESS NOTES
Reny Elena 57 y.o.   06/23/17    Subjective: Patient doing well well post debridement of left diabetic foot ulcer  Objective: Vital signs stable foot dressed  Vital Signs (last 72 hrs)       06/19 0700  -  06/20 0659 06/20 0700  -  06/21 0659 06/21 0700  -  06/22 0659 06/22 0700  -  06/23 0644   Most Recent    Temp (°F)   97.5 -  98.5    97.9 -  98.4    97.6 -  98.3     98 (36.7)    Heart Rate   65 -  75    60 -  72    59 -  64     61    Resp   18 -  20    18 -  20    18 -  20     18    BP   174/88 -  (!) 199/102    106/58 -  (!) 190/97    93/43 -  169/81     149/75    SpO2 (%)   95 -  99      91    93 -  96     93        Lab Results (last 72 hours)     Procedure Component Value Units Date/Time    CBC & Differential [[id number]] Collected:  06/20/17 2337    Specimen:  Blood Updated:  06/20/17 2346    Narrative:       The following orders were created for panel order CBC & Differential.  Procedure                               Abnormality         Status                     ---------                               -----------         ------                     CBC Auto Differential[[id number]]        Abnormal            Final result                 Please view results for these tests on the individual orders.    CBC Auto Differential [[id number]]  (Abnormal) Collected:  06/20/17 2337    Specimen:  Blood Updated:  06/20/17 2346     WBC 5.70 10*3/mm3      RBC 4.51 10*6/mm3      Hemoglobin 13.1 g/dL      Hematocrit 39.3 %      MCV 87.1 fL      MCH 29.0 pg      MCHC 33.3 g/dL      RDW 13.0 %      RDW-SD 40.7 fl      MPV 10.8 (H) fL      Platelets 149 10*3/mm3      Neutrophil % 54.0 %      Lymphocyte % 31.9 %      Monocyte % 7.4 %      Eosinophil % 6.0 (H) %      Basophil % 0.7 %      Immature Grans % 0.0 %      Neutrophils, Absolute 3.08 10*3/mm3      Lymphocytes, Absolute 1.82 10*3/mm3      Monocytes, Absolute 0.42 10*3/mm3      Eosinophils, Absolute 0.34 10*3/mm3      Basophils, Absolute 0.04 10*3/mm3      Immature  Grans, Absolute 0.00 10*3/mm3     Comprehensive Metabolic Panel [[id number]]  (Abnormal) Collected:  06/20/17 2337    Specimen:  Blood Updated:  06/21/17 0019     Glucose 338 (H) mg/dL      BUN 19 mg/dL      Creatinine 1.30 (H) mg/dL      Sodium 141 mmol/L      Potassium 4.2 mmol/L      Chloride 105 mmol/L      CO2 26.3 mmol/L      Calcium 9.6 mg/dL      Total Protein 7.9 g/dL      Albumin 4.30 g/dL      ALT (SGPT) 29 U/L      AST (SGOT) 23 U/L      Alkaline Phosphatase 111 (H) U/L       Note New Reference Ranges        Total Bilirubin 0.3 mg/dL      eGFR Non African Amer 42 (L) mL/min/1.73      Globulin 3.6 gm/dL      A/G Ratio 1.2 (L) g/dL      BUN/Creatinine Ratio 14.6     Anion Gap 9.7 mmol/L     Osmolality, Calculated [[id number]]  (Normal) Collected:  06/20/17 2337    Specimen:  Blood Updated:  06/21/17 0019     Osmolality Calc 296.8 mOsm/kg     C-reactive Protein [[id number]]  (Normal) Collected:  06/20/17 2337    Specimen:  Blood Updated:  06/21/17 0021     C-Reactive Protein <0.50 mg/dL     POC Glucose Fingerstick [[id number]]  (Abnormal) Collected:  06/21/17 0438    Specimen:  Blood Updated:  06/21/17 0444     Glucose 219 (H) mg/dL     Narrative:       Meter: [id number] : [id number] katie montes    POC Glucose Fingerstick [[id number]]  (Abnormal) Collected:  06/21/17 0753    Specimen:  Blood Updated:  06/21/17 0803     Glucose 229 (H) mg/dL     Narrative:       Meter: [id number] : [id number] Peace Yaneli S    POC Glucose Fingerstick [[id number]]  (Abnormal) Collected:  06/21/17 1140    Specimen:  Blood Updated:  06/21/17 1149     Glucose 294 (H) mg/dL     Narrative:       Meter: [id number] : [id number] Peace Yaneli S    POC Glucose Fingerstick [[id number]]  (Abnormal) Collected:  06/21/17 1704    Specimen:  Blood Updated:  06/21/17 1712     Glucose 331 (H) mg/dL     Narrative:       Meter: [id number] : [id number] Amisha GOMEZ    POC Glucose Fingerstick [[id number]]  (Abnormal) Collected:   06/21/17 2110    Specimen:  Blood Updated:  06/21/17 2136     Glucose 259 (H) mg/dL     Narrative:       Meter: [id number] : [id number] miller    Basic Metabolic Panel [[id number]]  (Abnormal) Collected:  06/22/17 0125    Specimen:  Blood Updated:  06/22/17 0335     Glucose 113 (H) mg/dL      BUN 18 mg/dL      Creatinine 1.14 mg/dL      Sodium 141 mmol/L      Potassium 4.0 mmol/L      Chloride 105 mmol/L      CO2 28.8 mmol/L      Calcium 9.1 mg/dL      eGFR Non African Amer 49 (L) mL/min/1.73      BUN/Creatinine Ratio 15.8     Anion Gap 7.2 mmol/L     Narrative:       GFR Normal >60  Chronic Kidney Disease <60  Kidney Failure <15    Osmolality, Calculated [[id number]]  (Normal) Collected:  06/22/17 0125    Specimen:  Blood Updated:  06/22/17 0336     Osmolality Calc 284.0 mOsm/kg     POC Glucose Fingerstick [[id number]]  (Abnormal) Collected:  06/22/17 0733    Specimen:  Blood Updated:  06/22/17 0741     Glucose 147 (H) mg/dL     Narrative:       Meter: [id number] : [id number] Amisha GOMEZ    POC Glucose Fingerstick [[id number]]  (Abnormal) Collected:  06/22/17 1200    Specimen:  Blood Updated:  06/22/17 1238     Glucose 243 (H) mg/dL     Narrative:       Meter: [id number] : [id number] Amisha Groves S    Wound Culture [[id number]] Collected:  06/22/17 0955    Specimen:  Wound from Foot, Left Updated:  06/22/17 1518     Gram Stain Result Rare (1+) WBCs seen      No organisms seen    POC Glucose Fingerstick [[id number]]  (Abnormal) Collected:  06/22/17 1717    Specimen:  Blood Updated:  06/22/17 1723     Glucose 264 (H) mg/dL     Narrative:       Meter: [id number] : [id number] phyllis hallman    POC Glucose Fingerstick [[id number]]  (Abnormal) Collected:  06/22/17 2050    Specimen:  Blood Updated:  06/22/17 2116     Glucose 134 (H) mg/dL     Narrative:       Meter: [id number] : [id number] miller        Imaging Results (last 24 hours)     ** No results found for the last 24  hours. **        Assessment:Active Problems:    Diabetic foot ulcers     Plan:Doing well post treatment and okay for discharge if okay with Dr. ferguson today

## 2017-06-23 NOTE — PROGRESS NOTES
Discharge Planning Assessment   Jose Alfredo     Patient Name: Reny Elena  MRN: [id number]  Today's Date: 6/23/2017    Admit Date: 6/20/2017       Discharge Plan       06/23/17 1715    Final Note    Final Note Pt is being discharged home. Pt to continue IV Rocephin and IV Daptomycin for one week. SS contacted [facility] per Dilia who states pt has one week of IV antibiotics at home. Pt utilizes [facility] [place], [facility]. SS contacted [facility] per Tayler. Nurse to call report to [facility]. SS faxed information to [facility]. No other needs identified.         Discharge Placement     Facility/Agency Request Status Selected? Address Phone Number Fax Number    [facility] Accepted     [address] [facility] [address] [phone]        Expected Discharge Date and Time     Expected Discharge Date Expected Discharge Time    Jun 23, 2017           Jihan Dugan

## 2017-06-24 LAB
BACTERIA SPEC AEROBE CULT: NO GROWTH
GRAM STN SPEC: NORMAL
GRAM STN SPEC: NORMAL

## 2017-06-24 NOTE — PAYOR COMM NOTE
"[facility]   GRANT PEREZ  PHONE  [phone]  FAX  [phone]    PATIENT D/C 6/23/17    Reny Merino (57 y.o. Female)     Date of Birth Social Security Number Address Home Phone MRN    1960  [address] [address] [phone] [id number]    [employer] Marital Status          Yazidi        Admission Date Admission Type Admitting Provider Attending Provider Department, Room/Bed    6/20/17 Emergency Yandel Paulson MD  [address], 3347/1S    Discharge Date Discharge Disposition Discharge Destination        6/23/2017 Home-Health Care Svc             Attending Provider: (none)    Allergies:  Codeine, Latex, Morphine And Related, Penicillins, Sulfa Antibiotics    Isolation:  None   Infection:  None   Code Status:  Prior    Ht:  65\" (165.1 cm)   Wt:  145 lb 3.2 oz (65.9 kg)    Admission Cmt:  None   Principal Problem:  None                Active Insurance as of 6/20/2017     Primary Coverage     Payor Plan Insurance Group Employer/Plan Group    [employer] [place] [employer]ICA[employer]      Payor Plan Address Payor Plan Phone Number Effective From Effective To    [address] [address] [phone] 4/12/2017     [place] [address]       Subscriber Name Subscriber Birth Date Member ID       RENY MERINO 1960 [id number]                 Emergency Contacts      (Rel.) Home Phone Work Phone Mobile Phone    Liza Oliva (Daughter) [phone] -- --              "

## 2017-06-25 NOTE — OP NOTE
DATE OF SURGERY: 06/22/2017    PREOPERATIVE DIAGNOSES:  1.  Necrotic diabetic wound 2 x 1 cm left 5th metatarsal head.   2.  Diabetes with neuropathy.     POSTOPERATIVE DIAGNOSES:   1.  Necrotic diabetic wound 2 x 1 cm left 5th metatarsal head.   2.  Diabetes with neuropathy.     PROCEDURE: Excisional debridement of 2 x 1 cm necrotic wound subcutaneous depth, left foot.     SURGEON: Basilio Morris DPM     SPECIMEN: Swab culture and sensitivity, and Gram stain.     DESCRIPTION OF PROCEDURE: Excisional debridement of subcutaneous depth wound, 2 x 1 cm, left foot, iodine prep: Left foot was excisionally debrided with a #15 blade pickup, removing nonviable, gangrenous necrotic tissue. The wound base was deep. Subcutaneous was lightly curettaged to lightly bleeding subcutaneous tissue. Swab culture was sent for culture and sensitivity and Gram stain. Bulky dressing was applied. The patient tolerated the procedure well.          D: HAROLDO 06/23/2017 21:20:32 ET  T: co / 06/23/2017 22:08:15 ET  Revision Count: 1  Job ID: [id number]  [id number]  cc:        Dictator Signature:___________________________     Basilio Morris DPM

## 2017-07-28 ENCOUNTER — LAB REQUISITION (OUTPATIENT)
Dept: LAB | Facility: HOSPITAL | Age: 57
End: 2017-07-28

## 2017-07-28 DIAGNOSIS — N19 KIDNEY FAILURE: ICD-10-CM

## 2017-07-28 LAB
ALBUMIN UR-MCNC: 55.3 MG/L
ANION GAP SERPL CALCULATED.3IONS-SCNC: 6 MMOL/L (ref 3.6–11.2)
BUN BLD-MCNC: 27 MG/DL (ref 7–21)
BUN/CREAT SERPL: 23.3 (ref 7–25)
CALCIUM SPEC-SCNC: 8.2 MG/DL (ref 7.7–10)
CHLORIDE SERPL-SCNC: 109 MMOL/L (ref 99–112)
CO2 SERPL-SCNC: 22 MMOL/L (ref 24.3–31.9)
CREAT BLD-MCNC: 1.16 MG/DL (ref 0.43–1.29)
CREAT UR-MCNC: 26.7 MG/DL
CREAT UR-MCNC: 26.7 MG/DL
GFR SERPL CREATININE-BSD FRML MDRD: 48 ML/MIN/1.73
GLUCOSE BLD-MCNC: 262 MG/DL (ref 70–110)
MICROALBUMIN/CREAT UR: 207.1 MG/G
OSMOLALITY SERPL CALC.SUM OF ELEC: 288 MOSM/KG (ref 273–305)
POTASSIUM BLD-SCNC: 4.5 MMOL/L (ref 3.5–5.3)
SODIUM BLD-SCNC: 137 MMOL/L (ref 135–153)

## 2017-07-28 PROCEDURE — 82043 UR ALBUMIN QUANTITATIVE: CPT | Performed by: INTERNAL MEDICINE

## 2017-07-28 PROCEDURE — 80048 BASIC METABOLIC PNL TOTAL CA: CPT | Performed by: INTERNAL MEDICINE

## 2017-07-28 PROCEDURE — 82570 ASSAY OF URINE CREATININE: CPT | Performed by: INTERNAL MEDICINE

## 2017-09-14 ENCOUNTER — LAB REQUISITION (OUTPATIENT)
Dept: LAB | Facility: HOSPITAL | Age: 57
End: 2017-09-14

## 2017-09-14 DIAGNOSIS — D64.9 ANEMIA: ICD-10-CM

## 2017-09-14 DIAGNOSIS — M86.9 OSTEOMYELITIS (HCC): ICD-10-CM

## 2017-09-14 LAB
BASOPHILS # BLD AUTO: 0.02 10*3/MM3 (ref 0–0.3)
BASOPHILS NFR BLD AUTO: 0.5 % (ref 0–2)
CRP SERPL-MCNC: <0.5 MG/DL (ref 0–0.99)
DEPRECATED RDW RBC AUTO: 44 FL (ref 37–54)
EOSINOPHIL # BLD AUTO: 0.17 10*3/MM3 (ref 0–0.7)
EOSINOPHIL NFR BLD AUTO: 4 % (ref 0–5)
ERYTHROCYTE [DISTWIDTH] IN BLOOD BY AUTOMATED COUNT: 13.9 % (ref 11.5–14.5)
ERYTHROCYTE [SEDIMENTATION RATE] IN BLOOD: 1 MM/HR (ref 0–30)
HCT VFR BLD AUTO: 35.5 % (ref 37–47)
HGB BLD-MCNC: 11.7 G/DL (ref 12–16)
IMM GRANULOCYTES # BLD: 0.01 10*3/MM3 (ref 0–0.03)
IMM GRANULOCYTES NFR BLD: 0.2 % (ref 0–0.5)
LYMPHOCYTES # BLD AUTO: 1.41 10*3/MM3 (ref 1–3)
LYMPHOCYTES NFR BLD AUTO: 33.1 % (ref 21–51)
MCH RBC QN AUTO: 29.2 PG (ref 27–33)
MCHC RBC AUTO-ENTMCNC: 33 G/DL (ref 33–37)
MCV RBC AUTO: 88.5 FL (ref 80–94)
MONOCYTES # BLD AUTO: 0.29 10*3/MM3 (ref 0.1–0.9)
MONOCYTES NFR BLD AUTO: 6.8 % (ref 0–10)
NEUTROPHILS # BLD AUTO: 2.36 10*3/MM3 (ref 1.4–6.5)
NEUTROPHILS NFR BLD AUTO: 55.4 % (ref 30–70)
PLATELET # BLD AUTO: 111 10*3/MM3 (ref 130–400)
PMV BLD AUTO: 11.4 FL (ref 6–10)
RBC # BLD AUTO: 4.01 10*6/MM3 (ref 4.2–5.4)
WBC NRBC COR # BLD: 4.26 10*3/MM3 (ref 4.5–12.5)

## 2017-09-14 PROCEDURE — 86140 C-REACTIVE PROTEIN: CPT | Performed by: PODIATRIST

## 2017-09-14 PROCEDURE — 85652 RBC SED RATE AUTOMATED: CPT | Performed by: PODIATRIST

## 2017-09-14 PROCEDURE — 85025 COMPLETE CBC W/AUTO DIFF WBC: CPT | Performed by: PODIATRIST

## 2017-10-16 ENCOUNTER — LAB REQUISITION (OUTPATIENT)
Dept: LAB | Facility: HOSPITAL | Age: 57
End: 2017-10-16

## 2017-10-16 DIAGNOSIS — N18.9 CHRONIC KIDNEY DISEASE: ICD-10-CM

## 2017-10-16 DIAGNOSIS — E11.9 TYPE 2 DIABETES MELLITUS WITHOUT COMPLICATIONS (HCC): ICD-10-CM

## 2017-10-16 DIAGNOSIS — I10 ESSENTIAL (PRIMARY) HYPERTENSION: ICD-10-CM

## 2017-10-16 LAB
ALBUMIN UR-MCNC: 149.2 MG/L
ANION GAP SERPL CALCULATED.3IONS-SCNC: 7.6 MMOL/L (ref 3.6–11.2)
BUN BLD-MCNC: 25 MG/DL (ref 7–21)
BUN/CREAT SERPL: 18.8 (ref 7–25)
CALCIUM SPEC-SCNC: 9.8 MG/DL (ref 7.7–10)
CHLORIDE SERPL-SCNC: 102 MMOL/L (ref 99–112)
CO2 SERPL-SCNC: 27.4 MMOL/L (ref 24.3–31.9)
CREAT BLD-MCNC: 1.33 MG/DL (ref 0.43–1.29)
CREAT UR-MCNC: 73.4 MG/DL
CREAT UR-MCNC: 73.4 MG/DL
GFR SERPL CREATININE-BSD FRML MDRD: 41 ML/MIN/1.73
GLUCOSE BLD-MCNC: 227 MG/DL (ref 70–110)
MICROALBUMIN/CREAT UR: 203.3 MG/G
OSMOLALITY SERPL CALC.SUM OF ELEC: 285.4 MOSM/KG (ref 273–305)
POTASSIUM BLD-SCNC: 4.7 MMOL/L (ref 3.5–5.3)
PROT UR-MCNC: 21.9 MG/DL
PROT/CREAT UR: 298.4 MG/G CREA (ref 0–200)
SODIUM BLD-SCNC: 137 MMOL/L (ref 135–153)

## 2017-10-16 PROCEDURE — 82043 UR ALBUMIN QUANTITATIVE: CPT | Performed by: INTERNAL MEDICINE

## 2017-10-16 PROCEDURE — 84156 ASSAY OF PROTEIN URINE: CPT | Performed by: INTERNAL MEDICINE

## 2017-10-16 PROCEDURE — 80048 BASIC METABOLIC PNL TOTAL CA: CPT | Performed by: INTERNAL MEDICINE

## 2017-10-16 PROCEDURE — 82570 ASSAY OF URINE CREATININE: CPT | Performed by: INTERNAL MEDICINE

## 2017-11-09 ENCOUNTER — LAB REQUISITION (OUTPATIENT)
Dept: LAB | Facility: HOSPITAL | Age: 57
End: 2017-11-09

## 2017-11-09 DIAGNOSIS — I10 ESSENTIAL (PRIMARY) HYPERTENSION: ICD-10-CM

## 2017-11-09 DIAGNOSIS — D64.9 ANEMIA: ICD-10-CM

## 2017-11-09 LAB
ALBUMIN SERPL-MCNC: 3.9 G/DL (ref 3.5–5)
ALBUMIN/GLOB SERPL: 1.6 G/DL (ref 1.5–2.5)
ALP SERPL-CCNC: 88 U/L (ref 35–104)
ALT SERPL W P-5'-P-CCNC: 23 U/L (ref 10–36)
ANION GAP SERPL CALCULATED.3IONS-SCNC: 4.3 MMOL/L (ref 3.6–11.2)
AST SERPL-CCNC: 20 U/L (ref 10–30)
BASOPHILS # BLD AUTO: 0.02 10*3/MM3 (ref 0–0.3)
BASOPHILS NFR BLD AUTO: 0.5 % (ref 0–2)
BILIRUB SERPL-MCNC: 0.3 MG/DL (ref 0.2–1.8)
BUN BLD-MCNC: 22 MG/DL (ref 7–21)
BUN/CREAT SERPL: 17.1 (ref 7–25)
CALCIUM SPEC-SCNC: 8.7 MG/DL (ref 7.7–10)
CHLORIDE SERPL-SCNC: 107 MMOL/L (ref 99–112)
CO2 SERPL-SCNC: 28.7 MMOL/L (ref 24.3–31.9)
CREAT BLD-MCNC: 1.29 MG/DL (ref 0.43–1.29)
DEPRECATED RDW RBC AUTO: 42.3 FL (ref 37–54)
EOSINOPHIL # BLD AUTO: 0.35 10*3/MM3 (ref 0–0.7)
EOSINOPHIL NFR BLD AUTO: 8.2 % (ref 0–5)
ERYTHROCYTE [DISTWIDTH] IN BLOOD BY AUTOMATED COUNT: 12.6 % (ref 11.5–14.5)
GFR SERPL CREATININE-BSD FRML MDRD: 42 ML/MIN/1.73
GLOBULIN UR ELPH-MCNC: 2.5 GM/DL
GLUCOSE BLD-MCNC: 283 MG/DL (ref 70–110)
HCT VFR BLD AUTO: 34.2 % (ref 37–47)
HGB BLD-MCNC: 11.1 G/DL (ref 12–16)
IMM GRANULOCYTES # BLD: 0 10*3/MM3 (ref 0–0.03)
IMM GRANULOCYTES NFR BLD: 0 % (ref 0–0.5)
LYMPHOCYTES # BLD AUTO: 1.49 10*3/MM3 (ref 1–3)
LYMPHOCYTES NFR BLD AUTO: 34.9 % (ref 21–51)
MCH RBC QN AUTO: 29.8 PG (ref 27–33)
MCHC RBC AUTO-ENTMCNC: 32.5 G/DL (ref 33–37)
MCV RBC AUTO: 91.7 FL (ref 80–94)
MONOCYTES # BLD AUTO: 0.14 10*3/MM3 (ref 0.1–0.9)
MONOCYTES NFR BLD AUTO: 3.3 % (ref 0–10)
NEUTROPHILS # BLD AUTO: 2.27 10*3/MM3 (ref 1.4–6.5)
NEUTROPHILS NFR BLD AUTO: 53.1 % (ref 30–70)
OSMOLALITY SERPL CALC.SUM OF ELEC: 293 MOSM/KG (ref 273–305)
PLATELET # BLD AUTO: 136 10*3/MM3 (ref 130–400)
PMV BLD AUTO: 10.1 FL (ref 6–10)
POTASSIUM BLD-SCNC: 4.4 MMOL/L (ref 3.5–5.3)
PROT SERPL-MCNC: 6.4 G/DL (ref 6–8)
RBC # BLD AUTO: 3.73 10*6/MM3 (ref 4.2–5.4)
SODIUM BLD-SCNC: 140 MMOL/L (ref 135–153)
WBC NRBC COR # BLD: 4.27 10*3/MM3 (ref 4.5–12.5)

## 2017-11-09 PROCEDURE — 85025 COMPLETE CBC W/AUTO DIFF WBC: CPT | Performed by: PODIATRIST

## 2017-11-09 PROCEDURE — 80053 COMPREHEN METABOLIC PANEL: CPT | Performed by: PODIATRIST

## 2017-12-14 ENCOUNTER — LAB REQUISITION (OUTPATIENT)
Dept: LAB | Facility: HOSPITAL | Age: 57
End: 2017-12-14

## 2017-12-14 DIAGNOSIS — R80.9 PROTEINURIA: ICD-10-CM

## 2017-12-14 DIAGNOSIS — N17.9 ACUTE KIDNEY FAILURE (HCC): ICD-10-CM

## 2017-12-14 LAB
ALBUMIN UR-MCNC: 167.9 MG/L
ANION GAP SERPL CALCULATED.3IONS-SCNC: 4.5 MMOL/L (ref 3.6–11.2)
BUN BLD-MCNC: 16 MG/DL (ref 7–21)
BUN/CREAT SERPL: 16.2 (ref 7–25)
CALCIUM SPEC-SCNC: 8.5 MG/DL (ref 7.7–10)
CHLORIDE SERPL-SCNC: 108 MMOL/L (ref 99–112)
CO2 SERPL-SCNC: 29.5 MMOL/L (ref 24.3–31.9)
CREAT BLD-MCNC: 0.99 MG/DL (ref 0.43–1.29)
CREAT UR-MCNC: 70.9 MG/DL
CREAT UR-MCNC: 73 MG/DL
GFR SERPL CREATININE-BSD FRML MDRD: 57 ML/MIN/1.73
GLUCOSE BLD-MCNC: 279 MG/DL (ref 70–110)
MICROALBUMIN/CREAT UR: 236.8 MG/G
OSMOLALITY SERPL CALC.SUM OF ELEC: 294.3 MOSM/KG (ref 273–305)
POTASSIUM BLD-SCNC: 4.3 MMOL/L (ref 3.5–5.3)
PROT UR-MCNC: 33.7 MG/DL
PROT/CREAT UR: 461.6 MG/G CREA (ref 0–200)
SODIUM BLD-SCNC: 142 MMOL/L (ref 135–153)

## 2017-12-14 PROCEDURE — 82043 UR ALBUMIN QUANTITATIVE: CPT | Performed by: INTERNAL MEDICINE

## 2017-12-14 PROCEDURE — 84156 ASSAY OF PROTEIN URINE: CPT | Performed by: INTERNAL MEDICINE

## 2017-12-14 PROCEDURE — 80048 BASIC METABOLIC PNL TOTAL CA: CPT | Performed by: INTERNAL MEDICINE

## 2017-12-14 PROCEDURE — 82570 ASSAY OF URINE CREATININE: CPT | Performed by: INTERNAL MEDICINE

## 2018-01-12 ENCOUNTER — LAB REQUISITION (OUTPATIENT)
Dept: LAB | Facility: HOSPITAL | Age: 58
End: 2018-01-12

## 2018-01-12 DIAGNOSIS — I10 ESSENTIAL (PRIMARY) HYPERTENSION: ICD-10-CM

## 2018-01-12 LAB
ANION GAP SERPL CALCULATED.3IONS-SCNC: 3.9 MMOL/L (ref 3.6–11.2)
BUN BLD-MCNC: 20 MG/DL (ref 7–21)
BUN/CREAT SERPL: 18.7 (ref 7–25)
CALCIUM SPEC-SCNC: 8.5 MG/DL (ref 7.7–10)
CHLORIDE SERPL-SCNC: 111 MMOL/L (ref 99–112)
CO2 SERPL-SCNC: 25.1 MMOL/L (ref 24.3–31.9)
CREAT BLD-MCNC: 1.07 MG/DL (ref 0.43–1.29)
GFR SERPL CREATININE-BSD FRML MDRD: 52 ML/MIN/1.73
GLUCOSE BLD-MCNC: 211 MG/DL (ref 70–110)
OSMOLALITY SERPL CALC.SUM OF ELEC: 288.3 MOSM/KG (ref 273–305)
POTASSIUM BLD-SCNC: 4.2 MMOL/L (ref 3.5–5.3)
SODIUM BLD-SCNC: 140 MMOL/L (ref 135–153)

## 2018-01-12 PROCEDURE — 80048 BASIC METABOLIC PNL TOTAL CA: CPT | Performed by: INTERNAL MEDICINE

## 2018-01-18 ENCOUNTER — APPOINTMENT (OUTPATIENT)
Dept: GENERAL RADIOLOGY | Facility: HOSPITAL | Age: 58
End: 2018-01-18

## 2018-01-18 ENCOUNTER — HOSPITAL ENCOUNTER (EMERGENCY)
Facility: HOSPITAL | Age: 58
Discharge: HOME | End: 2018-01-18
Attending: EMERGENCY MEDICINE | Admitting: EMERGENCY MEDICINE

## 2018-01-18 ENCOUNTER — APPOINTMENT (OUTPATIENT)
Dept: CT IMAGING | Facility: HOSPITAL | Age: 58
End: 2018-01-18

## 2018-01-18 VITALS
RESPIRATION RATE: 18 BRPM | DIASTOLIC BLOOD PRESSURE: 112 MMHG | HEIGHT: 66 IN | BODY MASS INDEX: 22.34 KG/M2 | TEMPERATURE: 98.2 F | SYSTOLIC BLOOD PRESSURE: 143 MMHG | WEIGHT: 139 LBS | OXYGEN SATURATION: 100 % | HEART RATE: 62 BPM

## 2018-01-18 DIAGNOSIS — I95.9 TRANSIENT HYPOTENSION: ICD-10-CM

## 2018-01-18 DIAGNOSIS — R55 NEAR SYNCOPE: Primary | ICD-10-CM

## 2018-01-18 DIAGNOSIS — Z79.4 TYPE 2 DIABETES MELLITUS WITH HYPERGLYCEMIA, WITH LONG-TERM CURRENT USE OF INSULIN (HCC): ICD-10-CM

## 2018-01-18 DIAGNOSIS — E11.65 TYPE 2 DIABETES MELLITUS WITH HYPERGLYCEMIA, WITH LONG-TERM CURRENT USE OF INSULIN (HCC): ICD-10-CM

## 2018-01-18 LAB
ALBUMIN SERPL-MCNC: 4.4 G/DL (ref 3.5–5)
ALBUMIN/GLOB SERPL: 1.6 G/DL (ref 1.5–2.5)
ALP SERPL-CCNC: 115 U/L (ref 35–104)
ALT SERPL W P-5'-P-CCNC: 17 U/L (ref 10–36)
ANION GAP SERPL CALCULATED.3IONS-SCNC: 5.4 MMOL/L (ref 3.6–11.2)
AST SERPL-CCNC: 24 U/L (ref 10–30)
BACTERIA UR QL AUTO: NORMAL /HPF
BASOPHILS # BLD AUTO: 0.03 10*3/MM3 (ref 0–0.3)
BASOPHILS NFR BLD AUTO: 0.6 % (ref 0–2)
BILIRUB SERPL-MCNC: 0.9 MG/DL (ref 0.2–1.8)
BILIRUB UR QL STRIP: NEGATIVE
BUN BLD-MCNC: 20 MG/DL (ref 7–21)
BUN/CREAT SERPL: 14.2 (ref 7–25)
CALCIUM SPEC-SCNC: 9.2 MG/DL (ref 7.7–10)
CHLORIDE SERPL-SCNC: 99 MMOL/L (ref 99–112)
CK MB SERPL-CCNC: 28.24 NG/ML (ref 0–5)
CK MB SERPL-CCNC: 30.66 NG/ML (ref 0–5)
CK MB SERPL-RTO: 10.3 % (ref 0–3)
CK MB SERPL-RTO: 10.9 % (ref 0–3)
CK SERPL-CCNC: 275 U/L (ref 24–173)
CK SERPL-CCNC: 281 U/L (ref 24–173)
CLARITY UR: CLEAR
CO2 SERPL-SCNC: 26.6 MMOL/L (ref 24.3–31.9)
COLOR UR: YELLOW
CREAT BLD-MCNC: 1.41 MG/DL (ref 0.43–1.29)
D-LACTATE SERPL-SCNC: 1.6 MMOL/L (ref 0.5–2)
DEPRECATED RDW RBC AUTO: 41.9 FL (ref 37–54)
DEVELOPER EXPIRATION DATE: NORMAL
DEVELOPER LOT NUMBER: NORMAL
EOSINOPHIL # BLD AUTO: 0.32 10*3/MM3 (ref 0–0.7)
EOSINOPHIL NFR BLD AUTO: 6.9 % (ref 0–5)
ERYTHROCYTE [DISTWIDTH] IN BLOOD BY AUTOMATED COUNT: 13.2 % (ref 11.5–14.5)
EXPIRATION DATE: NORMAL
FECAL OCCULT BLOOD SCREEN, POC: NEGATIVE
FLUAV AG NPH QL: NEGATIVE
FLUBV AG NPH QL IA: NEGATIVE
GFR SERPL CREATININE-BSD FRML MDRD: 38 ML/MIN/1.73
GLOBULIN UR ELPH-MCNC: 2.8 GM/DL
GLUCOSE BLD-MCNC: 522 MG/DL (ref 70–110)
GLUCOSE BLDC GLUCOMTR-MCNC: 298 MG/DL (ref 70–130)
GLUCOSE UR STRIP-MCNC: ABNORMAL MG/DL
HBA1C MFR BLD: 8.9 % (ref 4.5–5.7)
HCT VFR BLD AUTO: 33.9 % (ref 37–47)
HGB BLD-MCNC: 11.3 G/DL (ref 12–16)
HGB UR QL STRIP.AUTO: NEGATIVE
HYALINE CASTS UR QL AUTO: NORMAL /LPF
IMM GRANULOCYTES # BLD: 0 10*3/MM3 (ref 0–0.03)
IMM GRANULOCYTES NFR BLD: 0 % (ref 0–0.5)
KETONES UR QL STRIP: NEGATIVE
LEUKOCYTE ESTERASE UR QL STRIP.AUTO: ABNORMAL
LIPASE SERPL-CCNC: 29 U/L (ref 13–60)
LYMPHOCYTES # BLD AUTO: 1.51 10*3/MM3 (ref 1–3)
LYMPHOCYTES NFR BLD AUTO: 32.6 % (ref 21–51)
Lab: NORMAL
MAGNESIUM SERPL-MCNC: 2.1 MG/DL (ref 1.7–2.6)
MCH RBC QN AUTO: 29.8 PG (ref 27–33)
MCHC RBC AUTO-ENTMCNC: 33.3 G/DL (ref 33–37)
MCV RBC AUTO: 89.4 FL (ref 80–94)
MONOCYTES # BLD AUTO: 0.38 10*3/MM3 (ref 0.1–0.9)
MONOCYTES NFR BLD AUTO: 8.2 % (ref 0–10)
NEGATIVE CONTROL: NEGATIVE
NEUTROPHILS # BLD AUTO: 2.39 10*3/MM3 (ref 1.4–6.5)
NEUTROPHILS NFR BLD AUTO: 51.7 % (ref 30–70)
NITRITE UR QL STRIP: NEGATIVE
OSMOLALITY SERPL CALC.SUM OF ELEC: 288.8 MOSM/KG (ref 273–305)
PH UR STRIP.AUTO: 6 [PH] (ref 5–8)
PLATELET # BLD AUTO: 135 10*3/MM3 (ref 130–400)
PMV BLD AUTO: 11.2 FL (ref 6–10)
POSITIVE CONTROL: POSITIVE
POTASSIUM BLD-SCNC: 5.2 MMOL/L (ref 3.5–5.3)
PROT SERPL-MCNC: 7.2 G/DL (ref 6–8)
PROT UR QL STRIP: NEGATIVE
RBC # BLD AUTO: 3.79 10*6/MM3 (ref 4.2–5.4)
RBC # UR: NORMAL /HPF
REF LAB TEST METHOD: NORMAL
SODIUM BLD-SCNC: 131 MMOL/L (ref 135–153)
SP GR UR STRIP: 1.01 (ref 1–1.03)
SQUAMOUS #/AREA URNS HPF: NORMAL /HPF
TROPONIN I SERPL-MCNC: 0.01 NG/ML
TROPONIN I SERPL-MCNC: <0.006 NG/ML
TSH SERPL DL<=0.05 MIU/L-ACNC: 2.31 MIU/ML (ref 0.55–4.78)
UROBILINOGEN UR QL STRIP: ABNORMAL
WBC NRBC COR # BLD: 4.63 10*3/MM3 (ref 4.5–12.5)
WBC UR QL AUTO: NORMAL /HPF

## 2018-01-18 PROCEDURE — 82270 OCCULT BLOOD FECES: CPT | Performed by: EMERGENCY MEDICINE

## 2018-01-18 PROCEDURE — 87804 INFLUENZA ASSAY W/OPTIC: CPT | Performed by: EMERGENCY MEDICINE

## 2018-01-18 PROCEDURE — 83605 ASSAY OF LACTIC ACID: CPT | Performed by: EMERGENCY MEDICINE

## 2018-01-18 PROCEDURE — 83735 ASSAY OF MAGNESIUM: CPT | Performed by: EMERGENCY MEDICINE

## 2018-01-18 PROCEDURE — 81001 URINALYSIS AUTO W/SCOPE: CPT | Performed by: EMERGENCY MEDICINE

## 2018-01-18 PROCEDURE — 70450 CT HEAD/BRAIN W/O DYE: CPT | Performed by: RADIOLOGY

## 2018-01-18 PROCEDURE — 80050 GENERAL HEALTH PANEL: CPT | Performed by: EMERGENCY MEDICINE

## 2018-01-18 PROCEDURE — 82550 ASSAY OF CK (CPK): CPT | Performed by: EMERGENCY MEDICINE

## 2018-01-18 PROCEDURE — 93005 ELECTROCARDIOGRAM TRACING: CPT | Performed by: EMERGENCY MEDICINE

## 2018-01-18 PROCEDURE — 63710000001 INSULIN REGULAR HUMAN PER 5 UNITS: Performed by: EMERGENCY MEDICINE

## 2018-01-18 PROCEDURE — 93010 ELECTROCARDIOGRAM REPORT: CPT | Performed by: INTERNAL MEDICINE

## 2018-01-18 PROCEDURE — 83690 ASSAY OF LIPASE: CPT | Performed by: EMERGENCY MEDICINE

## 2018-01-18 PROCEDURE — 96360 HYDRATION IV INFUSION INIT: CPT

## 2018-01-18 PROCEDURE — 82962 GLUCOSE BLOOD TEST: CPT

## 2018-01-18 PROCEDURE — 84484 ASSAY OF TROPONIN QUANT: CPT | Performed by: EMERGENCY MEDICINE

## 2018-01-18 PROCEDURE — 99284 EMERGENCY DEPT VISIT MOD MDM: CPT

## 2018-01-18 PROCEDURE — 83036 HEMOGLOBIN GLYCOSYLATED A1C: CPT | Performed by: EMERGENCY MEDICINE

## 2018-01-18 PROCEDURE — 71045 X-RAY EXAM CHEST 1 VIEW: CPT

## 2018-01-18 PROCEDURE — 70450 CT HEAD/BRAIN W/O DYE: CPT

## 2018-01-18 PROCEDURE — 82553 CREATINE MB FRACTION: CPT | Performed by: EMERGENCY MEDICINE

## 2018-01-18 PROCEDURE — 87040 BLOOD CULTURE FOR BACTERIA: CPT | Performed by: EMERGENCY MEDICINE

## 2018-01-18 PROCEDURE — 36415 COLL VENOUS BLD VENIPUNCTURE: CPT

## 2018-01-18 PROCEDURE — 71045 X-RAY EXAM CHEST 1 VIEW: CPT | Performed by: RADIOLOGY

## 2018-01-18 PROCEDURE — 96361 HYDRATE IV INFUSION ADD-ON: CPT

## 2018-01-18 RX ORDER — SODIUM CHLORIDE 9 MG/ML
150 INJECTION, SOLUTION INTRAVENOUS CONTINUOUS
Status: DISCONTINUED | OUTPATIENT
Start: 2018-01-18 | End: 2018-01-18 | Stop reason: HOSPADM

## 2018-01-18 RX ADMIN — SODIUM CHLORIDE 150 ML/HR: 9 INJECTION, SOLUTION INTRAVENOUS at 13:55

## 2018-01-18 RX ADMIN — SODIUM CHLORIDE 1000 ML: 9 INJECTION, SOLUTION INTRAVENOUS at 12:39

## 2018-01-18 RX ADMIN — HUMAN INSULIN 5 UNITS: 100 INJECTION, SOLUTION SUBCUTANEOUS at 14:56

## 2018-01-18 NOTE — ED NOTES
Patient states she raised her arms up this morning to grab her jeans out of her closet and felt like she was going to pass out. Patients spouse states patient then fell to the ground and was out for a few seconds but was never completely out. Patient states she took her BP at home and it was 68/44 and then when they went to PCP office her BP was 58/54. Patient states here lately she gets dizzy and light headed when she raises her hands up above her head.  Patient resting quietly on stretcher with no complaints. Pt AAOx4. No respiratory distress noted. Respirations even and unlabored. Pt denies any needs at this time. Skin PWD. Will continue to monitor and follow plan of care. Bed rails up x 2, bed in lowest position, call light within reach.      Ashely Stevens RN  01/18/18 5137

## 2018-01-18 NOTE — ED PROVIDER NOTES
Subjective   HPI Comments: Patient is a 57-year-old white female sent from Dr. Lorene barron for evaluation.  She and her  (common-law) tell me that she had an appointment that was a regularly scheduled visit with Dr. Paulson today.  She has not been ill in any way.   states that her blood pressure at home was 68 systolic, with Dr. Lorene barron and she had an episode of near syncope where she did not completely lose consciousness.  He states that her blood pressure Dr. Paulson office was 50 systolic.  Patient was thus sent to the emergency department.  Upon arrival here she voices she feels better she no longer feels lightheaded.  She has had no fever, chills, headaches, chest pain, shortness of breath, nausea, vomiting, diarrhea, hemoptysis, hematemesis, hematochezia or melena.  She has had no focal numbness or weakness, no dizziness until the episode today at Dr. Lorene barron.  She denies having had any other associated symptoms or complaints.  She tells me that she is asymptomatic now.    Patient is a 57 y.o. female presenting with syncope.   History provided by:  Patient and spouse  Syncope   Associated symptoms: no chest pain, no confusion, no diaphoresis, no difficulty breathing, no fever, no focal weakness, no headaches, no nausea, no palpitations, no recent fall, no recent injury, no recent surgery, no rectal bleeding, no seizures, no shortness of breath, no visual change and no vomiting        Review of Systems   Constitutional: Negative for chills, diaphoresis and fever.   HENT: Negative for congestion, ear pain, sore throat and tinnitus.    Eyes: Negative for photophobia, pain and redness.   Respiratory: Negative for shortness of breath.    Cardiovascular: Positive for syncope. Negative for chest pain and palpitations.   Gastrointestinal: Negative for abdominal distention, abdominal pain, blood in stool, diarrhea, nausea and vomiting.   Endocrine: Negative for polydipsia, polyphagia and polyuria.    Genitourinary: Negative for difficulty urinating, dysuria and flank pain.   Musculoskeletal: Negative for back pain, neck pain and neck stiffness.   Skin: Negative for rash and wound.   Neurological: Negative for tremors, focal weakness, seizures, syncope, facial asymmetry, speech difficulty, numbness and headaches.   Psychiatric/Behavioral: Negative for confusion.   All other systems reviewed and are negative.      Past Medical History:   Diagnosis Date   • Arthritis    • Depression    • Diabetes mellitus    • Hypertension        Allergies   Allergen Reactions   • Codeine      Pt tolerates Norco @ home   • Latex      Added based on information entered during case entry, please review and add reactions, type, and severity as needed   • Morphine And Related Confusion   • Penicillins    • Sulfa Antibiotics        Past Surgical History:   Procedure Laterality Date   • BACK SURGERY     • CATARACT EXTRACTION      Left    •  SECTION     • DENTAL PROCEDURE     • HYSTERECTOMY     • INCISION AND DRAINAGE LEG Left 4/15/2017    Procedure: INCISION AND DRAINAGE LOWER EXTREMITY;  Surgeon: Basilio Morris MD;  Location: [facility];  Service:    • INCISION AND DRAINAGE LEG Left 2017    Procedure: Irrigation and Debridment abcess diabetic wound left foot with deep culture;  Surgeon: Basilio Morris MD;  Location: [facility];  Service:    • TOE AMPUTATION     • TUBAL ABDOMINAL LIGATION         Family History   Problem Relation Age of Onset   • Heart disease Mother    • Cancer Mother    • COPD Mother        Social History     Social History   • Marital status:      Spouse name: N/A   • Number of children: N/A   • Years of education: N/A     Social History Main Topics   • Smoking status: Never Smoker   • Smokeless tobacco: None   • Alcohol use No   • Drug use: No   • Sexual activity: Defer     Other Topics Concern   • None     Social History Narrative           Objective   Physical Exam   Constitutional: She is  oriented to person, place, and time. She appears well-developed and well-nourished. No distress.   HENT:   Head: Normocephalic and atraumatic.   Eyes: EOM are normal. Pupils are equal, round, and reactive to light. No scleral icterus.   Neck: Normal range of motion. Neck supple. No rigidity. No tracheal deviation present.   Cardiovascular: Normal rate, regular rhythm and intact distal pulses.    Pulmonary/Chest: Effort normal and breath sounds normal. No respiratory distress. She exhibits no tenderness.   Abdominal: Soft. Bowel sounds are normal. She exhibits no distension. There is no tenderness. There is no rebound and no guarding.   Genitourinary:   Genitourinary Comments: Rectal exam reveals no palpable masses.  Stool is soft, brown, heme negative.   Musculoskeletal: Normal range of motion. She exhibits no tenderness.   Neurological: She is alert and oriented to person, place, and time. She has normal strength. No sensory deficit. She exhibits normal muscle tone. Coordination normal. GCS eye subscore is 4. GCS verbal subscore is 5. GCS motor subscore is 6.   Skin: Skin is warm and dry. She is not diaphoretic. No cyanosis.   Psychiatric: She has a normal mood and affect. Her behavior is normal.   Vitals reviewed.      Procedures  EKG shows sinus bradycardia with a rate of 58.  There is some baseline artifact.  Nonseptic ST-T changes.  CT Head Without Contrast   Final Result   No acute intracranial abnormality.       This report was finalized on 1/18/2018 1:39 PM by Dr. Harrison Biswas MD.          XR Chest 1 View   Final Result   No radiographic evidence of acute cardiac or pulmonary   disease.       This report was finalized on 1/18/2018 12:57 PM by Dr. Harrison Biswas MD.            Results for orders placed or performed during the hospital encounter of 01/18/18   Influenza Antigen, Rapid - Swab, Nasopharynx   Result Value Ref Range    Influenza A Ag, EIA Negative Negative    Influenza B Ag, EIA Negative Negative    Comprehensive Metabolic Panel   Result Value Ref Range    Glucose 522 (C) 70 - 110 mg/dL    BUN 20 7 - 21 mg/dL    Creatinine 1.41 (H) 0.43 - 1.29 mg/dL    Sodium 131 (L) 135 - 153 mmol/L    Potassium 5.2 3.5 - 5.3 mmol/L    Chloride 99 99 - 112 mmol/L    CO2 26.6 24.3 - 31.9 mmol/L    Calcium 9.2 7.7 - 10.0 mg/dL    Total Protein 7.2 6.0 - 8.0 g/dL    Albumin 4.40 3.50 - 5.00 g/dL    ALT (SGPT) 17 10 - 36 U/L    AST (SGOT) 24 10 - 30 U/L    Alkaline Phosphatase 115 (H) 35 - 104 U/L    Total Bilirubin 0.9 0.2 - 1.8 mg/dL    eGFR Non African Amer 38 (L) >60 mL/min/1.73    Globulin 2.8 gm/dL    A/G Ratio 1.6 1.5 - 2.5 g/dL    BUN/Creatinine Ratio 14.2 7.0 - 25.0    Anion Gap 5.4 3.6 - 11.2 mmol/L   Lipase   Result Value Ref Range    Lipase 29 13 - 60 U/L   Urinalysis With / Culture If Indicated - Urine, Clean Catch   Result Value Ref Range    Color, UA Yellow Yellow, Straw    Appearance, UA Clear Clear    pH, UA 6.0 5.0 - 8.0    Specific Gravity, UA 1.011 1.005 - 1.030    Glucose, UA >=1000 mg/dL (3+) (A) Negative    Ketones, UA Negative Negative    Bilirubin, UA Negative Negative    Blood, UA Negative Negative    Protein, UA Negative Negative    Leuk Esterase, UA Trace (A) Negative    Nitrite, UA Negative Negative    Urobilinogen, UA 0.2 E.U./dL 0.2 - 1.0 E.U./dL   CK   Result Value Ref Range    Creatine Kinase 275 (H) 24 - 173 U/L   CK-MB   Result Value Ref Range    CKMB 28.24 (C) 0.00 - 5.00 ng/mL   Troponin   Result Value Ref Range    Troponin I 0.007 <=0.040 ng/mL   Lactic Acid, Plasma   Result Value Ref Range    Lactate 1.6 0.5 - 2.0 mmol/L   TSH   Result Value Ref Range    TSH 2.313 0.550 - 4.780 mIU/mL   Magnesium   Result Value Ref Range    Magnesium 2.1 1.7 - 2.6 mg/dL   CBC Auto Differential   Result Value Ref Range    WBC 4.63 4.50 - 12.50 10*3/mm3    RBC 3.79 (L) 4.20 - 5.40 10*6/mm3    Hemoglobin 11.3 (L) 12.0 - 16.0 g/dL    Hematocrit 33.9 (L) 37.0 - 47.0 %    MCV 89.4 80.0 - 94.0 fL    MCH 29.8 27.0  - 33.0 pg    MCHC 33.3 33.0 - 37.0 g/dL    RDW 13.2 11.5 - 14.5 %    RDW-SD 41.9 37.0 - 54.0 fl    MPV 11.2 (H) 6.0 - 10.0 fL    Platelets 135 130 - 400 10*3/mm3    Neutrophil % 51.7 30.0 - 70.0 %    Lymphocyte % 32.6 21.0 - 51.0 %    Monocyte % 8.2 0.0 - 10.0 %    Eosinophil % 6.9 (H) 0.0 - 5.0 %    Basophil % 0.6 0.0 - 2.0 %    Immature Grans % 0.0 0.0 - 0.5 %    Neutrophils, Absolute 2.39 1.40 - 6.50 10*3/mm3    Lymphocytes, Absolute 1.51 1.00 - 3.00 10*3/mm3    Monocytes, Absolute 0.38 0.10 - 0.90 10*3/mm3    Eosinophils, Absolute 0.32 0.00 - 0.70 10*3/mm3    Basophils, Absolute 0.03 0.00 - 0.30 10*3/mm3    Immature Grans, Absolute 0.00 0.00 - 0.03 10*3/mm3   Hemoglobin A1c   Result Value Ref Range    Hemoglobin A1C 8.90 (H) 4.50 - 5.70 %   Osmolality, Calculated   Result Value Ref Range    Osmolality Calc 288.8 273.0 - 305.0 mOsm/kg   CK-MB Index   Result Value Ref Range    CK-MB Index 10.3 (H) 0.0 - 3.0 %   CK   Result Value Ref Range    Creatine Kinase 281 (H) 24 - 173 U/L   CK-MB   Result Value Ref Range    CKMB 30.66 (C) 0.00 - 5.00 ng/mL   Troponin   Result Value Ref Range    Troponin I <0.006 <=0.040 ng/mL   Urinalysis, Microscopic Only - Urine, Clean Catch   Result Value Ref Range    RBC, UA 0-2 None Seen, 0-2 /HPF    WBC, UA 0-2 None Seen, 0-2 /HPF    Bacteria, UA None Seen None Seen /HPF    Squamous Epithelial Cells, UA None Seen None Seen, 0-2 /HPF    Hyaline Casts, UA None Seen None Seen /LPF    Methodology Automated Microscopy    CK-MB Index   Result Value Ref Range    CK-MB Index 10.9 (H) 0.0 - 3.0 %   POC Occult Blood Stool   Result Value Ref Range    Fecal Occult Blood Negative Negative    Lot Number [id number]     Expiration Date 12/19     DEVELOPER LOT NUMBER [id number]     DEVELOPER EXPIRATION DATE 2/20     Positive Control Positive Positive    Negative Control Negative Negative   POC Glucose Once   Result Value Ref Range    Glucose 298 (H) 70 - 130 mg/dL              ED Course  ED Course   Comment By  Time   Patient's emergency department stay has not been complicated.  Never has she shown any signs of distress.  She voices she feels much better and she wants to go home.  I discussed case with Dr. Jaimes, who advises that the patient does not meet admission criteria and advises for me to discharge her to home.  I discussed the cardiac enzymes with Dr. Prather, who advises that the elevated MB fractions are not significant, and with her elevated troponins that it is fine for her to go home.  Patient is very pleased that she does not have been admitted to the hospital.  She advises that she will follow-up closely with Dr. Paulson.  She will return the emergency Department right away for symptoms worsen or if she has any other problems. Yoan Santos MD 01/18 3157                  [facility]    Final diagnoses:   Near syncope   Type 2 diabetes mellitus with hyperglycemia, with long-term current use of insulin   Transient hypotension             Please note that portions of this note were completed with a voice recognition program. Efforts were made to edit the dictations, but occasionally words are mistranscribed.       Yoan Santos MD  01/18/18 1800

## 2018-01-18 NOTE — ED NOTES
Notified Dr. Santos of patients rising BP. Patient states she has not taken her BP medication today.      Ashely Stevens RN  01/18/18 3662

## 2018-01-18 NOTE — DISCHARGE INSTRUCTIONS
Home in care of family.  Rest and drink plenty of fluids.  Take your medications as prescribed.  Follow-up with Dr. Paulson in the office tomorrow for recheck.  Return the emergency Department right away if symptoms worsen/any problems.    Hypertension  Hypertension, commonly called high blood pressure, is when the force of blood pumping through your arteries is too strong. Your arteries are the blood vessels that carry blood from your heart throughout your body. A blood pressure reading consists of a higher number over a lower number, such as 110/72. The higher number (systolic) is the pressure inside your arteries when your heart pumps. The lower number (diastolic) is the pressure inside your arteries when your heart relaxes. Ideally you want your blood pressure below 120/80.  Hypertension forces your heart to work harder to pump blood. Your arteries may become narrow or stiff. Having untreated or uncontrolled hypertension can cause heart attack, stroke, kidney disease, and other problems.  What increases the risk?  Some risk factors for high blood pressure are controllable. Others are not.  Risk factors you cannot control include:  · Race. You may be at higher risk if you are .  · Age. Risk increases with age.  · Gender. Men are at higher risk than women before age 45 years. After age 65, women are at higher risk than men.  Risk factors you can control include:  · Not getting enough exercise or physical activity.  · Being overweight.  · Getting too much fat, sugar, calories, or salt in your diet.  · Drinking too much alcohol.  What are the signs or symptoms?  Hypertension does not usually cause signs or symptoms. Extremely high blood pressure (hypertensive crisis) may cause headache, anxiety, shortness of breath, and nosebleed.  How is this diagnosed?  To check if you have hypertension, your health care provider will measure your blood pressure while you are seated, with your arm held at the level  of your heart. It should be measured at least twice using the same arm. Certain conditions can cause a difference in blood pressure between your right and left arms. A blood pressure reading that is higher than normal on one occasion does not mean that you need treatment. If it is not clear whether you have high blood pressure, you may be asked to return on a different day to have your blood pressure checked again. Or, you may be asked to monitor your blood pressure at home for 1 or more weeks.  How is this treated?  Treating high blood pressure includes making lifestyle changes and possibly taking medicine. Living a healthy lifestyle can help lower high blood pressure. You may need to change some of your habits.  Lifestyle changes may include:  · Following the DASH diet. This diet is high in fruits, vegetables, and whole grains. It is low in salt, red meat, and added sugars.  · Keep your sodium intake below 2,300 mg per day.  · Getting at least 30-45 minutes of aerobic exercise at least 4 times per week.  · Losing weight if necessary.  · Not smoking.  · Limiting alcoholic beverages.  · Learning ways to reduce stress.  Your health care provider may prescribe medicine if lifestyle changes are not enough to get your blood pressure under control, and if one of the following is true:  · You are 18-59 years of age and your systolic blood pressure is above 140.  · You are 60 years of age or older, and your systolic blood pressure is above 150.  · Your diastolic blood pressure is above 90.  · You have diabetes, and your systolic blood pressure is over 140 or your diastolic blood pressure is over 90.  · You have kidney disease and your blood pressure is above 140/90.  · You have heart disease and your blood pressure is above 140/90.  Your personal target blood pressure may vary depending on your medical conditions, your age, and other factors.  Follow these instructions at home:  · Have your blood pressure rechecked as  directed by your health care provider.  · Take medicines only as directed by your health care provider. Follow the directions carefully. Blood pressure medicines must be taken as prescribed. The medicine does not work as well when you skip doses. Skipping doses also puts you at risk for problems.  · Do not smoke.  · Monitor your blood pressure at home as directed by your health care provider.  Contact a health care provider if:  · You think you are having a reaction to medicines taken.  · You have recurrent headaches or feel dizzy.  · You have swelling in your ankles.  · You have trouble with your vision.  Get help right away if:  · You develop a severe headache or confusion.  · You have unusual weakness, numbness, or feel faint.  · You have severe chest or abdominal pain.  · You vomit repeatedly.  · You have trouble breathing.  This information is not intended to replace advice given to you by your health care provider. Make sure you discuss any questions you have with your health care provider.  Document Released: 12/18/2006 Document Revised: 05/25/2017 Document Reviewed: 10/10/2014  [employer] Patient Education © 2017 [employer]

## 2018-01-23 LAB
BACTERIA SPEC AEROBE CULT: NORMAL
BACTERIA SPEC AEROBE CULT: NORMAL

## 2018-04-06 ENCOUNTER — LAB (OUTPATIENT)
Dept: LAB | Facility: HOSPITAL | Age: 58
End: 2018-04-06
Attending: INTERNAL MEDICINE

## 2018-04-06 ENCOUNTER — TRANSCRIBE ORDERS (OUTPATIENT)
Dept: ADMINISTRATIVE | Facility: HOSPITAL | Age: 58
End: 2018-04-06

## 2018-04-06 DIAGNOSIS — R80.9 PROTEINURIA, UNSPECIFIED TYPE: Primary | ICD-10-CM

## 2018-04-06 DIAGNOSIS — R80.9 PROTEINURIA, UNSPECIFIED TYPE: ICD-10-CM

## 2018-04-06 LAB
ALBUMIN UR-MCNC: 378.3 MG/L
ANION GAP SERPL CALCULATED.3IONS-SCNC: 6.3 MMOL/L (ref 3.6–11.2)
BUN BLD-MCNC: 23 MG/DL (ref 7–21)
BUN/CREAT SERPL: 20.4 (ref 7–25)
CALCIUM SPEC-SCNC: 9.4 MG/DL (ref 7.7–10)
CHLORIDE SERPL-SCNC: 103 MMOL/L (ref 99–112)
CO2 SERPL-SCNC: 30.7 MMOL/L (ref 24.3–31.9)
CREAT BLD-MCNC: 1.13 MG/DL (ref 0.43–1.29)
CREAT UR-MCNC: 49.4 MG/DL
CREAT UR-MCNC: 50.2 MG/DL
GFR SERPL CREATININE-BSD FRML MDRD: 49 ML/MIN/1.73
GLUCOSE BLD-MCNC: 247 MG/DL (ref 70–110)
MICROALBUMIN/CREAT UR: 753.6 MG/G
OSMOLALITY SERPL CALC.SUM OF ELEC: 291.3 MOSM/KG (ref 273–305)
POTASSIUM BLD-SCNC: 4.4 MMOL/L (ref 3.5–5.3)
PROT UR-MCNC: 55.2 MG/DL
PROT/CREAT UR: 1117.4 MG/G CREA (ref 0–200)
SODIUM BLD-SCNC: 140 MMOL/L (ref 135–153)

## 2018-04-06 PROCEDURE — 80048 BASIC METABOLIC PNL TOTAL CA: CPT

## 2018-04-06 PROCEDURE — 82570 ASSAY OF URINE CREATININE: CPT

## 2018-04-06 PROCEDURE — 36415 COLL VENOUS BLD VENIPUNCTURE: CPT

## 2018-04-06 PROCEDURE — 84156 ASSAY OF PROTEIN URINE: CPT

## 2018-04-06 PROCEDURE — 82043 UR ALBUMIN QUANTITATIVE: CPT

## 2018-06-16 ENCOUNTER — HOSPITAL ENCOUNTER (EMERGENCY)
Facility: HOSPITAL | Age: 58
Discharge: HOME | End: 2018-06-16
Attending: EMERGENCY MEDICINE | Admitting: EMERGENCY MEDICINE

## 2018-06-16 ENCOUNTER — APPOINTMENT (OUTPATIENT)
Dept: GENERAL RADIOLOGY | Facility: HOSPITAL | Age: 58
End: 2018-06-16

## 2018-06-16 VITALS
BODY MASS INDEX: 22.5 KG/M2 | HEART RATE: 62 BPM | HEIGHT: 66 IN | OXYGEN SATURATION: 97 % | SYSTOLIC BLOOD PRESSURE: 167 MMHG | WEIGHT: 140 LBS | RESPIRATION RATE: 18 BRPM | TEMPERATURE: 98.4 F | DIASTOLIC BLOOD PRESSURE: 70 MMHG

## 2018-06-16 DIAGNOSIS — M79.672 LEFT FOOT PAIN: Primary | ICD-10-CM

## 2018-06-16 LAB
ALBUMIN SERPL-MCNC: 4.8 G/DL (ref 3.4–4.8)
ALBUMIN/GLOB SERPL: 1.5 G/DL (ref 1.5–2.5)
ALP SERPL-CCNC: 99 U/L (ref 35–104)
ALT SERPL W P-5'-P-CCNC: 18 U/L (ref 10–36)
ANION GAP SERPL CALCULATED.3IONS-SCNC: 6.3 MMOL/L (ref 3.6–11.2)
AST SERPL-CCNC: 18 U/L (ref 10–30)
BACTERIA UR QL AUTO: NORMAL /HPF
BASOPHILS # BLD AUTO: 0.03 10*3/MM3 (ref 0–0.3)
BASOPHILS NFR BLD AUTO: 0.5 % (ref 0–2)
BILIRUB SERPL-MCNC: 0.6 MG/DL (ref 0.2–1.8)
BILIRUB UR QL STRIP: NEGATIVE
BUN BLD-MCNC: 20 MG/DL (ref 7–21)
BUN/CREAT SERPL: 18 (ref 7–25)
CALCIUM SPEC-SCNC: 9.6 MG/DL (ref 7.7–10)
CHLORIDE SERPL-SCNC: 105 MMOL/L (ref 99–112)
CLARITY UR: CLEAR
CO2 SERPL-SCNC: 27.7 MMOL/L (ref 24.3–31.9)
COLOR UR: YELLOW
CREAT BLD-MCNC: 1.11 MG/DL (ref 0.43–1.29)
CRP SERPL-MCNC: 2.4 MG/DL (ref 0–0.99)
D-LACTATE SERPL-SCNC: 0.8 MMOL/L (ref 0.5–2)
DEPRECATED RDW RBC AUTO: 40.9 FL (ref 37–54)
EOSINOPHIL # BLD AUTO: 0.12 10*3/MM3 (ref 0–0.7)
EOSINOPHIL NFR BLD AUTO: 1.9 % (ref 0–5)
ERYTHROCYTE [DISTWIDTH] IN BLOOD BY AUTOMATED COUNT: 12.8 % (ref 11.5–14.5)
ERYTHROCYTE [SEDIMENTATION RATE] IN BLOOD: 53 MM/HR (ref 0–30)
GFR SERPL CREATININE-BSD FRML MDRD: 50 ML/MIN/1.73
GLOBULIN UR ELPH-MCNC: 3.2 GM/DL
GLUCOSE BLD-MCNC: 264 MG/DL (ref 70–110)
GLUCOSE UR STRIP-MCNC: ABNORMAL MG/DL
HCT VFR BLD AUTO: 39.2 % (ref 37–47)
HGB BLD-MCNC: 13.2 G/DL (ref 12–16)
HGB UR QL STRIP.AUTO: ABNORMAL
HYALINE CASTS UR QL AUTO: NORMAL /LPF
IMM GRANULOCYTES # BLD: 0.01 10*3/MM3 (ref 0–0.03)
IMM GRANULOCYTES NFR BLD: 0.2 % (ref 0–0.5)
KETONES UR QL STRIP: ABNORMAL
LEUKOCYTE ESTERASE UR QL STRIP.AUTO: NEGATIVE
LYMPHOCYTES # BLD AUTO: 1.46 10*3/MM3 (ref 1–3)
LYMPHOCYTES NFR BLD AUTO: 23.4 % (ref 21–51)
MCH RBC QN AUTO: 29.9 PG (ref 27–33)
MCHC RBC AUTO-ENTMCNC: 33.7 G/DL (ref 33–37)
MCV RBC AUTO: 88.7 FL (ref 80–94)
MONOCYTES # BLD AUTO: 0.27 10*3/MM3 (ref 0.1–0.9)
MONOCYTES NFR BLD AUTO: 4.3 % (ref 0–10)
NEUTROPHILS # BLD AUTO: 4.36 10*3/MM3 (ref 1.4–6.5)
NEUTROPHILS NFR BLD AUTO: 69.7 % (ref 30–70)
NITRITE UR QL STRIP: NEGATIVE
OSMOLALITY SERPL CALC.SUM OF ELEC: 289.3 MOSM/KG (ref 273–305)
PH UR STRIP.AUTO: <=5 [PH] (ref 5–8)
PLATELET # BLD AUTO: 180 10*3/MM3 (ref 130–400)
PMV BLD AUTO: 10.2 FL (ref 6–10)
POTASSIUM BLD-SCNC: 4.2 MMOL/L (ref 3.5–5.3)
PROT SERPL-MCNC: 8 G/DL (ref 6–8)
PROT UR QL STRIP: ABNORMAL
RBC # BLD AUTO: 4.42 10*6/MM3 (ref 4.2–5.4)
RBC # UR: NORMAL /HPF
REF LAB TEST METHOD: NORMAL
SODIUM BLD-SCNC: 139 MMOL/L (ref 135–153)
SP GR UR STRIP: 1.02 (ref 1–1.03)
SQUAMOUS #/AREA URNS HPF: NORMAL /HPF
UROBILINOGEN UR QL STRIP: ABNORMAL
WBC NRBC COR # BLD: 6.25 10*3/MM3 (ref 4.5–12.5)
WBC UR QL AUTO: NORMAL /HPF

## 2018-06-16 PROCEDURE — 73630 X-RAY EXAM OF FOOT: CPT | Performed by: RADIOLOGY

## 2018-06-16 PROCEDURE — 86140 C-REACTIVE PROTEIN: CPT | Performed by: NURSE PRACTITIONER

## 2018-06-16 PROCEDURE — 99283 EMERGENCY DEPT VISIT LOW MDM: CPT

## 2018-06-16 PROCEDURE — 85025 COMPLETE CBC W/AUTO DIFF WBC: CPT | Performed by: NURSE PRACTITIONER

## 2018-06-16 PROCEDURE — 36415 COLL VENOUS BLD VENIPUNCTURE: CPT

## 2018-06-16 PROCEDURE — 73630 X-RAY EXAM OF FOOT: CPT

## 2018-06-16 PROCEDURE — 80053 COMPREHEN METABOLIC PANEL: CPT | Performed by: NURSE PRACTITIONER

## 2018-06-16 PROCEDURE — 85652 RBC SED RATE AUTOMATED: CPT | Performed by: NURSE PRACTITIONER

## 2018-06-16 PROCEDURE — 87040 BLOOD CULTURE FOR BACTERIA: CPT | Performed by: NURSE PRACTITIONER

## 2018-06-16 PROCEDURE — 83605 ASSAY OF LACTIC ACID: CPT | Performed by: NURSE PRACTITIONER

## 2018-06-16 PROCEDURE — 81001 URINALYSIS AUTO W/SCOPE: CPT | Performed by: NURSE PRACTITIONER

## 2018-06-16 RX ORDER — SODIUM CHLORIDE 0.9 % (FLUSH) 0.9 %
10 SYRINGE (ML) INJECTION AS NEEDED
Status: DISCONTINUED | OUTPATIENT
Start: 2018-06-16 | End: 2018-06-16 | Stop reason: HOSPADM

## 2018-06-16 RX ORDER — DOXYCYCLINE 100 MG/1
100 CAPSULE ORAL 2 TIMES DAILY
Qty: 20 CAPSULE | Refills: 0 | Status: SHIPPED | OUTPATIENT
Start: 2018-06-16 | End: 2018-06-26

## 2018-06-16 RX ORDER — OXYCODONE AND ACETAMINOPHEN 10; 325 MG/1; MG/1
1 TABLET ORAL ONCE
Status: COMPLETED | OUTPATIENT
Start: 2018-06-16 | End: 2018-06-16

## 2018-06-16 RX ADMIN — OXYCODONE HYDROCHLORIDE AND ACETAMINOPHEN 1 TABLET: 10; 325 TABLET ORAL at 15:54

## 2018-06-17 NOTE — ED PROVIDER NOTES
Subjective     History provided by:  Patient  Lower Extremity Issue   Location:  Foot  Injury: no    Foot location:  L foot  Pain details:     Quality:  Aching    Radiates to:  Does not radiate    Severity:  Moderate    Onset quality:  Gradual    Timing:  Constant    Progression:  Worsening  Chronicity:  Recurrent  Dislocation: no    Foreign body present:  No foreign bodies  Tetanus status:  Up to date  Prior injury to area:  Yes  Relieved by:  None tried  Worsened by:  Nothing  Ineffective treatments:  None tried  Associated symptoms: decreased ROM and swelling    Associated symptoms: no fever        Review of Systems   Constitutional: Negative.  Negative for fever.   HENT: Negative.    Respiratory: Negative.    Cardiovascular: Negative.  Negative for chest pain.   Gastrointestinal: Negative.  Negative for abdominal pain.   Endocrine: Negative.    Genitourinary: Negative.  Negative for dysuria.   Skin: Negative.    Neurological: Negative.    Psychiatric/Behavioral: Negative.    All other systems reviewed and are negative.      Past Medical History:   Diagnosis Date   • Arthritis    • Depression    • Diabetes mellitus    • Hypertension        Allergies   Allergen Reactions   • Codeine      Pt tolerates Norco @ home   • Latex      Added based on information entered during case entry, please review and add reactions, type, and severity as needed   • Morphine And Related Confusion   • Penicillins    • Sulfa Antibiotics        Past Surgical History:   Procedure Laterality Date   • BACK SURGERY     • CATARACT EXTRACTION      Left    •  SECTION     • DENTAL PROCEDURE     • HYSTERECTOMY     • INCISION AND DRAINAGE LEG Left 4/15/2017    Procedure: INCISION AND DRAINAGE LOWER EXTREMITY;  Surgeon: Basilio Morris MD;  Location: [facility];  Service:    • INCISION AND DRAINAGE LEG Left 2017    Procedure: Irrigation and Debridment abcess diabetic wound left foot with deep culture;  Surgeon: Basilio Morris MD;   Location: [facility] OR;  Service:    • TOE AMPUTATION     • TUBAL ABDOMINAL LIGATION         Family History   Problem Relation Age of Onset   • Heart disease Mother    • Cancer Mother    • COPD Mother        Social History     Social History   • Marital status:      Social History Main Topics   • Smoking status: Never Smoker   • Alcohol use No   • Drug use: No   • Sexual activity: Defer     Other Topics Concern   • Not on file           Objective   Physical Exam   Constitutional: She is oriented to person, place, and time. She appears well-developed and well-nourished. No distress.   HENT:   Head: Normocephalic and atraumatic.   Right Ear: External ear normal.   Left Ear: External ear normal.   Nose: Nose normal.   Eyes: Conjunctivae and EOM are normal. Pupils are equal, round, and reactive to light.   Neck: Normal range of motion. Neck supple. No JVD present. No tracheal deviation present.   Cardiovascular: Normal rate, regular rhythm and normal heart sounds.    No murmur heard.  Pulmonary/Chest: Effort normal and breath sounds normal. No respiratory distress. She has no wheezes.   Abdominal: Soft. Bowel sounds are normal. There is no tenderness.   Musculoskeletal: She exhibits no edema or deformity.        Left foot: There is decreased range of motion and tenderness.   Neurological: She is alert and oriented to person, place, and time. No cranial nerve deficit.   Skin: Skin is warm and dry. No rash noted. She is not diaphoretic. No erythema. No pallor.   Psychiatric: She has a normal mood and affect. Her behavior is normal. Thought content normal.   Nursing note and vitals reviewed.      Procedures           ED Course                  MDM  Number of Diagnoses or Management Options  Left foot pain: new and does not require workup     Amount and/or Complexity of Data Reviewed  Clinical lab tests: reviewed  Tests in the radiology section of CPT®: reviewed  Decide to obtain previous medical records or to obtain  history from someone other than the patient: yes    Risk of Complications, Morbidity, and/or Mortality  Presenting problems: low  Diagnostic procedures: low  Management options: low    Patient Progress  Patient progress: stable        Final diagnoses:   Left foot pain            Brie Juarez, APRN  06/17/18 [id number]

## 2018-06-21 LAB
BACTERIA SPEC AEROBE CULT: NORMAL
BACTERIA SPEC AEROBE CULT: NORMAL

## 2018-07-18 ENCOUNTER — APPOINTMENT (OUTPATIENT)
Dept: GENERAL RADIOLOGY | Facility: HOSPITAL | Age: 58
End: 2018-07-18

## 2018-07-18 ENCOUNTER — APPOINTMENT (OUTPATIENT)
Dept: CT IMAGING | Facility: HOSPITAL | Age: 58
End: 2018-07-18

## 2018-07-18 ENCOUNTER — HOSPITAL ENCOUNTER (EMERGENCY)
Facility: HOSPITAL | Age: 58
Discharge: ANOTHER HOSPITAL | End: 2018-07-18
Attending: EMERGENCY MEDICINE | Admitting: EMERGENCY MEDICINE

## 2018-07-18 ENCOUNTER — APPOINTMENT (OUTPATIENT)
Dept: NUCLEAR MEDICINE | Facility: HOSPITAL | Age: 58
End: 2018-07-18

## 2018-07-18 VITALS
WEIGHT: 136 LBS | SYSTOLIC BLOOD PRESSURE: 151 MMHG | DIASTOLIC BLOOD PRESSURE: 78 MMHG | RESPIRATION RATE: 16 BRPM | TEMPERATURE: 97.5 F | BODY MASS INDEX: 22.66 KG/M2 | OXYGEN SATURATION: 100 % | HEART RATE: 59 BPM | HEIGHT: 65 IN

## 2018-07-18 DIAGNOSIS — N17.9 AKI (ACUTE KIDNEY INJURY) (HCC): ICD-10-CM

## 2018-07-18 DIAGNOSIS — S82.831A CLOSED FRACTURE OF DISTAL END OF RIGHT FIBULA, UNSPECIFIED FRACTURE MORPHOLOGY, INITIAL ENCOUNTER: ICD-10-CM

## 2018-07-18 DIAGNOSIS — K92.2 GASTROINTESTINAL HEMORRHAGE, UNSPECIFIED GASTROINTESTINAL HEMORRHAGE TYPE: ICD-10-CM

## 2018-07-18 DIAGNOSIS — R55 SYNCOPE, UNSPECIFIED SYNCOPE TYPE: Primary | ICD-10-CM

## 2018-07-18 LAB
6-ACETYL MORPHINE: NEGATIVE
A-A DO2: 23.3 MMHG (ref 0–300)
ALBUMIN SERPL-MCNC: 3.7 G/DL (ref 3.4–4.8)
ALBUMIN/GLOB SERPL: 1.4 G/DL (ref 1.5–2.5)
ALP SERPL-CCNC: 108 U/L (ref 35–104)
ALT SERPL W P-5'-P-CCNC: 16 U/L (ref 10–36)
AMMONIA BLD-SCNC: 20 UMOL/L (ref 11–51)
AMPHET+METHAMPHET UR QL: NEGATIVE
AMYLASE SERPL-CCNC: 65 U/L (ref 28–100)
ANION GAP SERPL CALCULATED.3IONS-SCNC: 8 MMOL/L (ref 3.6–11.2)
APAP SERPL-MCNC: <10 MCG/ML (ref 0–200)
ARTERIAL PATENCY WRIST A: POSITIVE
AST SERPL-CCNC: 20 U/L (ref 10–30)
ATMOSPHERIC PRESS: 728 MMHG
BACTERIA UR QL AUTO: ABNORMAL /HPF
BARBITURATES UR QL SCN: NEGATIVE
BASE EXCESS BLDA CALC-SCNC: -6.2 MMOL/L
BASOPHILS # BLD AUTO: 0.03 10*3/MM3 (ref 0–0.3)
BASOPHILS NFR BLD AUTO: 0.5 % (ref 0–2)
BDY SITE: ABNORMAL
BENZODIAZ UR QL SCN: POSITIVE
BILIRUB SERPL-MCNC: 0.2 MG/DL (ref 0.2–1.8)
BILIRUB UR QL STRIP: NEGATIVE
BODY TEMPERATURE: 98.6 C
BUN BLD-MCNC: 69 MG/DL (ref 7–21)
BUN/CREAT SERPL: 33.8 (ref 7–25)
BUPRENORPHINE SERPL-MCNC: NEGATIVE NG/ML
CALCIUM SPEC-SCNC: 8.5 MG/DL (ref 7.7–10)
CANNABINOIDS SERPL QL: NEGATIVE
CHLORIDE SERPL-SCNC: 106 MMOL/L (ref 99–112)
CLARITY UR: CLEAR
CO2 SERPL-SCNC: 22 MMOL/L (ref 24.3–31.9)
COCAINE UR QL: NEGATIVE
COHGB MFR BLD: 0.9 % (ref 0–5)
COLOR UR: YELLOW
CREAT BLD-MCNC: 2.04 MG/DL (ref 0.43–1.29)
CRP SERPL-MCNC: 0.54 MG/DL (ref 0–0.99)
D DIMER PPP FEU-MCNC: 5.21 MCGFEU/ML (ref 0–0.5)
D-LACTATE SERPL-SCNC: 0.7 MMOL/L (ref 0.5–2)
DEPRECATED RDW RBC AUTO: 41.5 FL (ref 37–54)
DEVELOPER EXPIRATION DATE: ABNORMAL
DEVELOPER LOT NUMBER: ABNORMAL
EOSINOPHIL # BLD AUTO: 0.26 10*3/MM3 (ref 0–0.7)
EOSINOPHIL NFR BLD AUTO: 4.1 % (ref 0–5)
ERYTHROCYTE [DISTWIDTH] IN BLOOD BY AUTOMATED COUNT: 13 % (ref 11.5–14.5)
ETHANOL BLD-MCNC: <10 MG/DL
ETHANOL UR QL: <0.01 %
EXPIRATION DATE: ABNORMAL
FECAL OCCULT BLOOD SCREEN, POC: POSITIVE
GFR SERPL CREATININE-BSD FRML MDRD: 25 ML/MIN/1.73
GLOBULIN UR ELPH-MCNC: 2.6 GM/DL
GLUCOSE BLD-MCNC: 392 MG/DL (ref 70–110)
GLUCOSE BLDC GLUCOMTR-MCNC: 195 MG/DL (ref 70–130)
GLUCOSE BLDC GLUCOMTR-MCNC: 390 MG/DL (ref 70–130)
GLUCOSE UR STRIP-MCNC: ABNORMAL MG/DL
HCO3 BLDA-SCNC: 20.3 MMOL/L (ref 22–26)
HCT VFR BLD AUTO: 33.2 % (ref 37–47)
HCT VFR BLD CALC: 33 % (ref 37–47)
HGB BLD-MCNC: 10.8 G/DL (ref 12–16)
HGB BLDA-MCNC: 11.1 G/DL (ref 12–16)
HGB UR QL STRIP.AUTO: NEGATIVE
HOLD SPECIMEN: NORMAL
HOLD SPECIMEN: NORMAL
HOROWITZ INDEX BLD+IHG-RTO: 21 %
HYALINE CASTS UR QL AUTO: ABNORMAL /LPF
IMM GRANULOCYTES # BLD: 0.01 10*3/MM3 (ref 0–0.03)
IMM GRANULOCYTES NFR BLD: 0.2 % (ref 0–0.5)
KETONES UR QL STRIP: NEGATIVE
LEUKOCYTE ESTERASE UR QL STRIP.AUTO: NEGATIVE
LIPASE SERPL-CCNC: 188 U/L (ref 13–60)
LYMPHOCYTES # BLD AUTO: 2.3 10*3/MM3 (ref 1–3)
LYMPHOCYTES NFR BLD AUTO: 35.9 % (ref 21–51)
Lab: ABNORMAL
MAGNESIUM SERPL-MCNC: 2.2 MG/DL (ref 1.7–2.6)
MCH RBC QN AUTO: 29.2 PG (ref 27–33)
MCHC RBC AUTO-ENTMCNC: 32.5 G/DL (ref 33–37)
MCV RBC AUTO: 89.7 FL (ref 80–94)
METHADONE UR QL SCN: NEGATIVE
METHGB BLD QL: 0.4 % (ref 0–3)
MODALITY: ABNORMAL
MONOCYTES # BLD AUTO: 0.56 10*3/MM3 (ref 0.1–0.9)
MONOCYTES NFR BLD AUTO: 8.8 % (ref 0–10)
NEGATIVE CONTROL: NEGATIVE
NEUTROPHILS # BLD AUTO: 3.24 10*3/MM3 (ref 1.4–6.5)
NEUTROPHILS NFR BLD AUTO: 50.5 % (ref 30–70)
NITRITE UR QL STRIP: NEGATIVE
OPIATES UR QL: NEGATIVE
OSMOLALITY SERPL CALC.SUM OF ELEC: 308.4 MOSM/KG (ref 273–305)
OXYCODONE UR QL SCN: NEGATIVE
OXYHGB MFR BLDV: 89.6 % (ref 85–100)
PCO2 BLDA: 44.2 MM HG (ref 35–45)
PCO2 TEMP ADJ BLD: ABNORMAL MM HG (ref 35–45)
PCP UR QL SCN: NEGATIVE
PH BLDA: 7.28 PH UNITS (ref 7.35–7.45)
PH UR STRIP.AUTO: <=5 [PH] (ref 5–8)
PH, TEMP CORRECTED: ABNORMAL PH UNITS (ref 7.35–7.45)
PLATELET # BLD AUTO: 142 10*3/MM3 (ref 130–400)
PMV BLD AUTO: 10.8 FL (ref 6–10)
PO2 BLDA: 66.8 MM HG (ref 80–100)
PO2 TEMP ADJ BLD: ABNORMAL MM HG (ref 83–108)
POSITIVE CONTROL: POSITIVE
POTASSIUM BLD-SCNC: 4.6 MMOL/L (ref 3.5–5.3)
PROT SERPL-MCNC: 6.3 G/DL (ref 6–8)
PROT UR QL STRIP: ABNORMAL
RBC # BLD AUTO: 3.7 10*6/MM3 (ref 4.2–5.4)
RBC # UR: ABNORMAL /HPF
REF LAB TEST METHOD: ABNORMAL
SALICYLATES SERPL-MCNC: <1 MG/DL (ref 0–30)
SAO2 % BLDCOA: 90.8 % (ref 90–100)
SODIUM BLD-SCNC: 136 MMOL/L (ref 135–153)
SP GR UR STRIP: 1.02 (ref 1–1.03)
SQUAMOUS #/AREA URNS HPF: ABNORMAL /HPF
TROPONIN I SERPL-MCNC: <0.006 NG/ML
TROPONIN I SERPL-MCNC: <0.006 NG/ML
UROBILINOGEN UR QL STRIP: ABNORMAL
WBC NRBC COR # BLD: 6.4 10*3/MM3 (ref 4.5–12.5)
WBC UR QL AUTO: ABNORMAL /HPF
WHOLE BLOOD HOLD SPECIMEN: NORMAL
WHOLE BLOOD HOLD SPECIMEN: NORMAL

## 2018-07-18 PROCEDURE — 73564 X-RAY EXAM KNEE 4 OR MORE: CPT | Performed by: RADIOLOGY

## 2018-07-18 PROCEDURE — 80307 DRUG TEST PRSMV CHEM ANLYZR: CPT | Performed by: PHYSICIAN ASSISTANT

## 2018-07-18 PROCEDURE — P9612 CATHETERIZE FOR URINE SPEC: HCPCS

## 2018-07-18 PROCEDURE — 83735 ASSAY OF MAGNESIUM: CPT | Performed by: PHYSICIAN ASSISTANT

## 2018-07-18 PROCEDURE — 82375 ASSAY CARBOXYHB QUANT: CPT | Performed by: PHYSICIAN ASSISTANT

## 2018-07-18 PROCEDURE — A9540 TC99M MAA: HCPCS | Performed by: EMERGENCY MEDICINE

## 2018-07-18 PROCEDURE — 82805 BLOOD GASES W/O2 SATURATION: CPT | Performed by: PHYSICIAN ASSISTANT

## 2018-07-18 PROCEDURE — 83690 ASSAY OF LIPASE: CPT | Performed by: PHYSICIAN ASSISTANT

## 2018-07-18 PROCEDURE — 84484 ASSAY OF TROPONIN QUANT: CPT | Performed by: PHYSICIAN ASSISTANT

## 2018-07-18 PROCEDURE — 74176 CT ABD & PELVIS W/O CONTRAST: CPT | Performed by: RADIOLOGY

## 2018-07-18 PROCEDURE — 82270 OCCULT BLOOD FECES: CPT | Performed by: PHYSICIAN ASSISTANT

## 2018-07-18 PROCEDURE — 80053 COMPREHEN METABOLIC PANEL: CPT | Performed by: PHYSICIAN ASSISTANT

## 2018-07-18 PROCEDURE — 73610 X-RAY EXAM OF ANKLE: CPT | Performed by: RADIOLOGY

## 2018-07-18 PROCEDURE — 72125 CT NECK SPINE W/O DYE: CPT | Performed by: RADIOLOGY

## 2018-07-18 PROCEDURE — 82962 GLUCOSE BLOOD TEST: CPT

## 2018-07-18 PROCEDURE — 73564 X-RAY EXAM KNEE 4 OR MORE: CPT

## 2018-07-18 PROCEDURE — 70450 CT HEAD/BRAIN W/O DYE: CPT

## 2018-07-18 PROCEDURE — 70450 CT HEAD/BRAIN W/O DYE: CPT | Performed by: RADIOLOGY

## 2018-07-18 PROCEDURE — 93005 ELECTROCARDIOGRAM TRACING: CPT | Performed by: EMERGENCY MEDICINE

## 2018-07-18 PROCEDURE — 0 TECHNETIUM TC 99M PENTETATE KIT: Performed by: EMERGENCY MEDICINE

## 2018-07-18 PROCEDURE — 63710000001 INSULIN REGULAR HUMAN PER 5 UNITS: Performed by: PHYSICIAN ASSISTANT

## 2018-07-18 PROCEDURE — 78582 LUNG VENTILAT&PERFUS IMAGING: CPT | Performed by: RADIOLOGY

## 2018-07-18 PROCEDURE — 83050 HGB METHEMOGLOBIN QUAN: CPT | Performed by: PHYSICIAN ASSISTANT

## 2018-07-18 PROCEDURE — 71250 CT THORAX DX C-: CPT | Performed by: RADIOLOGY

## 2018-07-18 PROCEDURE — 96365 THER/PROPH/DIAG IV INF INIT: CPT

## 2018-07-18 PROCEDURE — 71045 X-RAY EXAM CHEST 1 VIEW: CPT

## 2018-07-18 PROCEDURE — 96361 HYDRATE IV INFUSION ADD-ON: CPT

## 2018-07-18 PROCEDURE — 82140 ASSAY OF AMMONIA: CPT | Performed by: PHYSICIAN ASSISTANT

## 2018-07-18 PROCEDURE — 81001 URINALYSIS AUTO W/SCOPE: CPT | Performed by: PHYSICIAN ASSISTANT

## 2018-07-18 PROCEDURE — 99285 EMERGENCY DEPT VISIT HI MDM: CPT

## 2018-07-18 PROCEDURE — 85379 FIBRIN DEGRADATION QUANT: CPT | Performed by: PHYSICIAN ASSISTANT

## 2018-07-18 PROCEDURE — 72170 X-RAY EXAM OF PELVIS: CPT

## 2018-07-18 PROCEDURE — 71250 CT THORAX DX C-: CPT

## 2018-07-18 PROCEDURE — 83605 ASSAY OF LACTIC ACID: CPT | Performed by: PHYSICIAN ASSISTANT

## 2018-07-18 PROCEDURE — 72125 CT NECK SPINE W/O DYE: CPT

## 2018-07-18 PROCEDURE — 82150 ASSAY OF AMYLASE: CPT | Performed by: PHYSICIAN ASSISTANT

## 2018-07-18 PROCEDURE — A9539 TC99M PENTETATE: HCPCS | Performed by: EMERGENCY MEDICINE

## 2018-07-18 PROCEDURE — 36600 WITHDRAWAL OF ARTERIAL BLOOD: CPT | Performed by: PHYSICIAN ASSISTANT

## 2018-07-18 PROCEDURE — 78582 LUNG VENTILAT&PERFUS IMAGING: CPT

## 2018-07-18 PROCEDURE — 0 TECHNETIUM ALBUMIN AGGREGATED: Performed by: EMERGENCY MEDICINE

## 2018-07-18 PROCEDURE — 74176 CT ABD & PELVIS W/O CONTRAST: CPT

## 2018-07-18 PROCEDURE — 96376 TX/PRO/DX INJ SAME DRUG ADON: CPT

## 2018-07-18 PROCEDURE — 73610 X-RAY EXAM OF ANKLE: CPT

## 2018-07-18 PROCEDURE — 72170 X-RAY EXAM OF PELVIS: CPT | Performed by: RADIOLOGY

## 2018-07-18 PROCEDURE — 86140 C-REACTIVE PROTEIN: CPT | Performed by: PHYSICIAN ASSISTANT

## 2018-07-18 PROCEDURE — 85025 COMPLETE CBC W/AUTO DIFF WBC: CPT | Performed by: PHYSICIAN ASSISTANT

## 2018-07-18 PROCEDURE — 96375 TX/PRO/DX INJ NEW DRUG ADDON: CPT

## 2018-07-18 PROCEDURE — 25010000002 LORAZEPAM PER 2 MG: Performed by: EMERGENCY MEDICINE

## 2018-07-18 PROCEDURE — 87040 BLOOD CULTURE FOR BACTERIA: CPT | Performed by: PHYSICIAN ASSISTANT

## 2018-07-18 PROCEDURE — 71045 X-RAY EXAM CHEST 1 VIEW: CPT | Performed by: RADIOLOGY

## 2018-07-18 RX ORDER — SODIUM CHLORIDE 9 MG/ML
125 INJECTION, SOLUTION INTRAVENOUS CONTINUOUS
Status: DISCONTINUED | OUTPATIENT
Start: 2018-07-18 | End: 2018-07-18 | Stop reason: HOSPADM

## 2018-07-18 RX ORDER — LORAZEPAM 2 MG/ML
0.5 INJECTION INTRAMUSCULAR ONCE
Status: COMPLETED | OUTPATIENT
Start: 2018-07-18 | End: 2018-07-18

## 2018-07-18 RX ORDER — PANTOPRAZOLE SODIUM 40 MG/10ML
80 INJECTION, POWDER, LYOPHILIZED, FOR SOLUTION INTRAVENOUS ONCE
Status: COMPLETED | OUTPATIENT
Start: 2018-07-18 | End: 2018-07-18

## 2018-07-18 RX ORDER — HYDROCODONE BITARTRATE AND ACETAMINOPHEN 5; 325 MG/1; MG/1
1 TABLET ORAL ONCE
Status: COMPLETED | OUTPATIENT
Start: 2018-07-18 | End: 2018-07-18

## 2018-07-18 RX ORDER — PANTOPRAZOLE SODIUM 40 MG/10ML
INJECTION, POWDER, LYOPHILIZED, FOR SOLUTION INTRAVENOUS
Status: COMPLETED
Start: 2018-07-18 | End: 2018-07-18

## 2018-07-18 RX ORDER — SODIUM CHLORIDE 0.9 % (FLUSH) 0.9 %
10 SYRINGE (ML) INJECTION AS NEEDED
Status: DISCONTINUED | OUTPATIENT
Start: 2018-07-18 | End: 2018-07-18 | Stop reason: HOSPADM

## 2018-07-18 RX ADMIN — SODIUM CHLORIDE 1000 ML: 9 INJECTION, SOLUTION INTRAVENOUS at 11:20

## 2018-07-18 RX ADMIN — SODIUM CHLORIDE 125 ML/HR: 9 INJECTION, SOLUTION INTRAVENOUS at 15:31

## 2018-07-18 RX ADMIN — PANTOPRAZOLE SODIUM 8 MG/HR: 40 INJECTION, POWDER, FOR SOLUTION INTRAVENOUS at 15:54

## 2018-07-18 RX ADMIN — HUMAN INSULIN 5 UNITS: 100 INJECTION, SOLUTION SUBCUTANEOUS at 11:21

## 2018-07-18 RX ADMIN — HYDROCODONE BITARTRATE AND ACETAMINOPHEN 1 TABLET: 5; 325 TABLET ORAL at 15:42

## 2018-07-18 RX ADMIN — LORAZEPAM 0.5 MG: 2 INJECTION INTRAMUSCULAR; INTRAVENOUS at 15:43

## 2018-07-18 RX ADMIN — Medication 1 DOSE: at 14:17

## 2018-07-18 RX ADMIN — PANTOPRAZOLE SODIUM 80 MG: 40 INJECTION, POWDER, FOR SOLUTION INTRAVENOUS at 15:32

## 2018-07-18 RX ADMIN — KIT FOR THE PREPARATION OF TECHNETIUM TC 99M PENTETATE 1 DOSE: 20 INJECTION, POWDER, LYOPHILIZED, FOR SOLUTION INTRAVENOUS; RESPIRATORY (INHALATION) at 14:01

## 2018-07-18 RX ADMIN — PANTOPRAZOLE SODIUM 80 MG: 40 INJECTION, POWDER, LYOPHILIZED, FOR SOLUTION INTRAVENOUS at 15:32

## 2018-07-23 LAB
BACTERIA SPEC AEROBE CULT: NORMAL
BACTERIA SPEC AEROBE CULT: NORMAL

## 2018-11-21 ENCOUNTER — HOSPITAL ENCOUNTER (INPATIENT)
Facility: HOSPITAL | Age: 58
LOS: 3 days | Discharge: HOME | End: 2018-11-25
Attending: EMERGENCY MEDICINE | Admitting: HOSPITALIST

## 2018-11-21 ENCOUNTER — APPOINTMENT (OUTPATIENT)
Dept: GENERAL RADIOLOGY | Facility: HOSPITAL | Age: 58
End: 2018-11-21

## 2018-11-21 ENCOUNTER — APPOINTMENT (OUTPATIENT)
Dept: CT IMAGING | Facility: HOSPITAL | Age: 58
End: 2018-11-21

## 2018-11-21 DIAGNOSIS — A41.9 SEVERE SEPSIS (HCC): ICD-10-CM

## 2018-11-21 DIAGNOSIS — R65.20 SEVERE SEPSIS (HCC): ICD-10-CM

## 2018-11-21 DIAGNOSIS — R41.82 ALTERED MENTAL STATUS, UNSPECIFIED ALTERED MENTAL STATUS TYPE: Primary | ICD-10-CM

## 2018-11-21 DIAGNOSIS — R73.9 HYPERGLYCEMIA: ICD-10-CM

## 2018-11-21 LAB
6-ACETYL MORPHINE: NEGATIVE
A-A DO2: 18.7 MMHG (ref 0–300)
ALBUMIN SERPL-MCNC: 4.2 G/DL (ref 3.4–4.8)
ALBUMIN SERPL-MCNC: 4.7 G/DL (ref 3.4–4.8)
ALBUMIN/GLOB SERPL: 1.4 G/DL (ref 1.5–2.5)
ALBUMIN/GLOB SERPL: 1.4 G/DL (ref 1.5–2.5)
ALP SERPL-CCNC: 138 U/L (ref 35–104)
ALP SERPL-CCNC: 172 U/L (ref 35–104)
ALT SERPL W P-5'-P-CCNC: 14 U/L (ref 10–36)
ALT SERPL W P-5'-P-CCNC: 21 U/L (ref 10–36)
AMPHET+METHAMPHET UR QL: NEGATIVE
ANION GAP SERPL CALCULATED.3IONS-SCNC: 15.1 MMOL/L (ref 3.6–11.2)
ANION GAP SERPL CALCULATED.3IONS-SCNC: 17 MMOL/L (ref 3.6–11.2)
ARTERIAL PATENCY WRIST A: ABNORMAL
AST SERPL-CCNC: 23 U/L (ref 10–30)
AST SERPL-CCNC: 25 U/L (ref 10–30)
ATMOSPHERIC PRESS: 734 MMHG
BACTERIA UR QL AUTO: ABNORMAL /HPF
BARBITURATES UR QL SCN: NEGATIVE
BASE EXCESS BLDA CALC-SCNC: 2.7 MMOL/L
BASOPHILS # BLD AUTO: 0.04 10*3/MM3 (ref 0–0.3)
BASOPHILS NFR BLD AUTO: 0.4 % (ref 0–2)
BDY SITE: ABNORMAL
BENZODIAZ UR QL SCN: POSITIVE
BILIRUB SERPL-MCNC: 0.5 MG/DL (ref 0.2–1.8)
BILIRUB SERPL-MCNC: 0.8 MG/DL (ref 0.2–1.8)
BILIRUB UR QL STRIP: NEGATIVE
BNP SERPL-MCNC: 163 PG/ML (ref 0–100)
BODY TEMPERATURE: 103.8 C
BUN BLD-MCNC: 17 MG/DL (ref 7–21)
BUN BLD-MCNC: 20 MG/DL (ref 7–21)
BUN/CREAT SERPL: 14.5 (ref 7–25)
BUN/CREAT SERPL: 14.8 (ref 7–25)
BUPRENORPHINE SERPL-MCNC: NEGATIVE NG/ML
CALCIUM SPEC-SCNC: 9.4 MG/DL (ref 7.7–10)
CALCIUM SPEC-SCNC: 9.8 MG/DL (ref 7.7–10)
CANNABINOIDS SERPL QL: NEGATIVE
CHLORIDE SERPL-SCNC: 108 MMOL/L (ref 99–112)
CHLORIDE SERPL-SCNC: 97 MMOL/L (ref 99–112)
CLARITY UR: ABNORMAL
CO2 SERPL-SCNC: 21.9 MMOL/L (ref 24.3–31.9)
CO2 SERPL-SCNC: 28 MMOL/L (ref 24.3–31.9)
COCAINE UR QL: NEGATIVE
COHGB MFR BLD: 1.5 % (ref 0–5)
COLOR UR: YELLOW
CREAT BLD-MCNC: 1.15 MG/DL (ref 0.43–1.29)
CREAT BLD-MCNC: 1.38 MG/DL (ref 0.43–1.29)
CRP SERPL-MCNC: <0.5 MG/DL (ref 0–0.99)
D-LACTATE SERPL-SCNC: 5.9 MMOL/L (ref 0.5–2)
DEPRECATED RDW RBC AUTO: 40.6 FL (ref 37–54)
EOSINOPHIL # BLD AUTO: 0.03 10*3/MM3 (ref 0–0.7)
EOSINOPHIL NFR BLD AUTO: 0.3 % (ref 0–5)
ERYTHROCYTE [DISTWIDTH] IN BLOOD BY AUTOMATED COUNT: 13.1 % (ref 11.5–14.5)
FLUAV AG NPH QL: NEGATIVE
FLUBV AG NPH QL IA: NEGATIVE
GFR SERPL CREATININE-BSD FRML MDRD: 39 ML/MIN/1.73
GFR SERPL CREATININE-BSD FRML MDRD: 48 ML/MIN/1.73
GLOBULIN UR ELPH-MCNC: 2.9 GM/DL
GLOBULIN UR ELPH-MCNC: 3.3 GM/DL
GLUCOSE BLD-MCNC: 176 MG/DL (ref 70–110)
GLUCOSE BLD-MCNC: 402 MG/DL (ref 70–110)
GLUCOSE BLDC GLUCOMTR-MCNC: 212 MG/DL (ref 70–130)
GLUCOSE BLDC GLUCOMTR-MCNC: 417 MG/DL (ref 70–130)
GLUCOSE UR STRIP-MCNC: ABNORMAL MG/DL
HCO3 BLDA-SCNC: 27.1 MMOL/L (ref 22–26)
HCT VFR BLD AUTO: 37.9 % (ref 37–47)
HCT VFR BLD CALC: 39 % (ref 37–47)
HGB BLD-MCNC: 12.8 G/DL (ref 12–16)
HGB BLDA-MCNC: 13.4 G/DL (ref 12–16)
HGB UR QL STRIP.AUTO: ABNORMAL
HOROWITZ INDEX BLD+IHG-RTO: 21 %
IMM GRANULOCYTES # BLD: 0.02 10*3/MM3 (ref 0–0.03)
IMM GRANULOCYTES NFR BLD: 0.2 % (ref 0–0.5)
KETONES UR QL STRIP: ABNORMAL
LEUKOCYTE ESTERASE UR QL STRIP.AUTO: NEGATIVE
LYMPHOCYTES # BLD AUTO: 0.89 10*3/MM3 (ref 1–3)
LYMPHOCYTES NFR BLD AUTO: 9.7 % (ref 21–51)
MCH RBC QN AUTO: 29 PG (ref 27–33)
MCHC RBC AUTO-ENTMCNC: 33.8 G/DL (ref 33–37)
MCV RBC AUTO: 85.9 FL (ref 80–94)
METHADONE UR QL SCN: NEGATIVE
METHGB BLD QL: 0.5 % (ref 0–3)
MODALITY: ABNORMAL
MONOCYTES # BLD AUTO: 0.27 10*3/MM3 (ref 0.1–0.9)
MONOCYTES NFR BLD AUTO: 2.9 % (ref 0–10)
NEUTROPHILS # BLD AUTO: 7.91 10*3/MM3 (ref 1.4–6.5)
NEUTROPHILS NFR BLD AUTO: 86.5 % (ref 30–70)
NITRITE UR QL STRIP: NEGATIVE
OPIATES UR QL: POSITIVE
OSMOLALITY SERPL CALC.SUM OF ELEC: 294.5 MOSM/KG (ref 273–305)
OSMOLALITY SERPL CALC.SUM OF ELEC: 302.6 MOSM/KG (ref 273–305)
OXYCODONE UR QL SCN: NEGATIVE
OXYHGB MFR BLDV: 88.1 % (ref 85–100)
PCO2 BLDA: 41.1 MM HG (ref 35–45)
PCP UR QL SCN: NEGATIVE
PH BLDA: 7.44 PH UNITS (ref 7.35–7.45)
PH UR STRIP.AUTO: 6.5 [PH] (ref 5–8)
PLATELET # BLD AUTO: 203 10*3/MM3 (ref 130–400)
PMV BLD AUTO: 10.6 FL (ref 6–10)
PO2 BLDA: 55.5 MM HG (ref 80–100)
POTASSIUM BLD-SCNC: 3.2 MMOL/L (ref 3.5–5.3)
POTASSIUM BLD-SCNC: 3.4 MMOL/L (ref 3.5–5.3)
PROT SERPL-MCNC: 7.1 G/DL (ref 6–8)
PROT SERPL-MCNC: 8 G/DL (ref 6–8)
PROT UR QL STRIP: ABNORMAL
RBC # BLD AUTO: 4.41 10*6/MM3 (ref 4.2–5.4)
RBC # UR: ABNORMAL /HPF
REF LAB TEST METHOD: ABNORMAL
SAO2 % BLDCOA: 89.9 % (ref 90–100)
SODIUM BLD-SCNC: 142 MMOL/L (ref 135–153)
SODIUM BLD-SCNC: 145 MMOL/L (ref 135–153)
SP GR UR STRIP: >1.03 (ref 1–1.03)
SQUAMOUS #/AREA URNS HPF: ABNORMAL /HPF
TROPONIN I SERPL-MCNC: 0.01 NG/ML
UROBILINOGEN UR QL STRIP: ABNORMAL
WBC NRBC COR # BLD: 9.16 10*3/MM3 (ref 4.5–12.5)
WBC UR QL AUTO: ABNORMAL /HPF

## 2018-11-21 PROCEDURE — P9612 CATHETERIZE FOR URINE SPEC: HCPCS

## 2018-11-21 PROCEDURE — 80307 DRUG TEST PRSMV CHEM ANLYZR: CPT | Performed by: PHYSICIAN ASSISTANT

## 2018-11-21 PROCEDURE — 36415 COLL VENOUS BLD VENIPUNCTURE: CPT

## 2018-11-21 PROCEDURE — 81001 URINALYSIS AUTO W/SCOPE: CPT | Performed by: PHYSICIAN ASSISTANT

## 2018-11-21 PROCEDURE — 82375 ASSAY CARBOXYHB QUANT: CPT | Performed by: PHYSICIAN ASSISTANT

## 2018-11-21 PROCEDURE — 87040 BLOOD CULTURE FOR BACTERIA: CPT | Performed by: PHYSICIAN ASSISTANT

## 2018-11-21 PROCEDURE — 83050 HGB METHEMOGLOBIN QUAN: CPT | Performed by: PHYSICIAN ASSISTANT

## 2018-11-21 PROCEDURE — 82805 BLOOD GASES W/O2 SATURATION: CPT | Performed by: PHYSICIAN ASSISTANT

## 2018-11-21 PROCEDURE — 00JU3ZZ INSPECTION OF SPINAL CANAL, PERCUTANEOUS APPROACH: ICD-10-PCS | Performed by: EMERGENCY MEDICINE

## 2018-11-21 PROCEDURE — 25010000002 KETOROLAC TROMETHAMINE PER 15 MG: Performed by: PHYSICIAN ASSISTANT

## 2018-11-21 PROCEDURE — 25010000002 VANCOMYCIN 5 G RECONSTITUTED SOLUTION 5,000 MG VIAL: Performed by: PHYSICIAN ASSISTANT

## 2018-11-21 PROCEDURE — 63710000001 INSULIN REGULAR HUMAN PER 5 UNITS: Performed by: PHYSICIAN ASSISTANT

## 2018-11-21 PROCEDURE — 70450 CT HEAD/BRAIN W/O DYE: CPT

## 2018-11-21 PROCEDURE — 71045 X-RAY EXAM CHEST 1 VIEW: CPT

## 2018-11-21 PROCEDURE — 87804 INFLUENZA ASSAY W/OPTIC: CPT | Performed by: PHYSICIAN ASSISTANT

## 2018-11-21 PROCEDURE — 82962 GLUCOSE BLOOD TEST: CPT

## 2018-11-21 PROCEDURE — 25010000002 ONDANSETRON PER 1 MG: Performed by: PHYSICIAN ASSISTANT

## 2018-11-21 PROCEDURE — 84484 ASSAY OF TROPONIN QUANT: CPT | Performed by: PHYSICIAN ASSISTANT

## 2018-11-21 PROCEDURE — 93005 ELECTROCARDIOGRAM TRACING: CPT | Performed by: PHYSICIAN ASSISTANT

## 2018-11-21 PROCEDURE — 83605 ASSAY OF LACTIC ACID: CPT | Performed by: PHYSICIAN ASSISTANT

## 2018-11-21 PROCEDURE — 86140 C-REACTIVE PROTEIN: CPT | Performed by: PHYSICIAN ASSISTANT

## 2018-11-21 PROCEDURE — 80053 COMPREHEN METABOLIC PANEL: CPT | Performed by: PHYSICIAN ASSISTANT

## 2018-11-21 PROCEDURE — 99285 EMERGENCY DEPT VISIT HI MDM: CPT

## 2018-11-21 PROCEDURE — 85025 COMPLETE CBC W/AUTO DIFF WBC: CPT | Performed by: PHYSICIAN ASSISTANT

## 2018-11-21 PROCEDURE — 83880 ASSAY OF NATRIURETIC PEPTIDE: CPT | Performed by: PHYSICIAN ASSISTANT

## 2018-11-21 PROCEDURE — 25010000002 PROPOFOL 10 MG/ML EMULSION: Performed by: EMERGENCY MEDICINE

## 2018-11-21 PROCEDURE — 83735 ASSAY OF MAGNESIUM: CPT | Performed by: INTERNAL MEDICINE

## 2018-11-21 PROCEDURE — 70450 CT HEAD/BRAIN W/O DYE: CPT | Performed by: RADIOLOGY

## 2018-11-21 PROCEDURE — 36600 WITHDRAWAL OF ARTERIAL BLOOD: CPT | Performed by: PHYSICIAN ASSISTANT

## 2018-11-21 PROCEDURE — 71045 X-RAY EXAM CHEST 1 VIEW: CPT | Performed by: RADIOLOGY

## 2018-11-21 PROCEDURE — 25010000002 PROMETHAZINE PER 50 MG: Performed by: PHYSICIAN ASSISTANT

## 2018-11-21 RX ORDER — ACETAMINOPHEN 650 MG/1
650 SUPPOSITORY RECTAL ONCE
Status: COMPLETED | OUTPATIENT
Start: 2018-11-21 | End: 2018-11-21

## 2018-11-21 RX ORDER — MIDAZOLAM HYDROCHLORIDE 1 MG/ML
INJECTION INTRAMUSCULAR; INTRAVENOUS
Status: DISCONTINUED
Start: 2018-11-21 | End: 2018-11-21 | Stop reason: WASHOUT

## 2018-11-21 RX ORDER — PROMETHAZINE HYDROCHLORIDE 25 MG/ML
12.5 INJECTION, SOLUTION INTRAMUSCULAR; INTRAVENOUS ONCE
Status: COMPLETED | OUTPATIENT
Start: 2018-11-21 | End: 2018-11-21

## 2018-11-21 RX ORDER — ONDANSETRON 2 MG/ML
4 INJECTION INTRAMUSCULAR; INTRAVENOUS ONCE
Status: COMPLETED | OUTPATIENT
Start: 2018-11-21 | End: 2018-11-21

## 2018-11-21 RX ORDER — KETOROLAC TROMETHAMINE 30 MG/ML
30 INJECTION, SOLUTION INTRAMUSCULAR; INTRAVENOUS ONCE
Status: COMPLETED | OUTPATIENT
Start: 2018-11-21 | End: 2018-11-21

## 2018-11-21 RX ORDER — ACETAMINOPHEN 650 MG/1
650 SUPPOSITORY RECTAL ONCE
Status: DISCONTINUED | OUTPATIENT
Start: 2018-11-21 | End: 2018-11-21

## 2018-11-21 RX ORDER — MIDAZOLAM HYDROCHLORIDE 1 MG/ML
INJECTION INTRAMUSCULAR; INTRAVENOUS
Status: DISPENSED
Start: 2018-11-21 | End: 2018-11-22

## 2018-11-21 RX ORDER — PROPOFOL 10 MG/ML
VIAL (ML) INTRAVENOUS
Status: DISPENSED
Start: 2018-11-21 | End: 2018-11-22

## 2018-11-21 RX ORDER — SODIUM CHLORIDE 0.9 % (FLUSH) 0.9 %
10 SYRINGE (ML) INJECTION AS NEEDED
Status: DISCONTINUED | OUTPATIENT
Start: 2018-11-21 | End: 2018-11-25 | Stop reason: HOSPADM

## 2018-11-21 RX ADMIN — HUMAN INSULIN 6 UNITS: 100 INJECTION, SOLUTION SUBCUTANEOUS at 20:42

## 2018-11-21 RX ADMIN — ACETAMINOPHEN 650 MG: 650 SUPPOSITORY RECTAL at 20:40

## 2018-11-21 RX ADMIN — ACETAMINOPHEN 650 MG: 650 SUPPOSITORY RECTAL at 22:40

## 2018-11-21 RX ADMIN — ONDANSETRON 4 MG: 2 INJECTION INTRAMUSCULAR; INTRAVENOUS at 20:08

## 2018-11-21 RX ADMIN — PROMETHAZINE HYDROCHLORIDE 12.5 MG: 25 INJECTION INTRAMUSCULAR; INTRAVENOUS at 21:10

## 2018-11-21 RX ADMIN — AZTREONAM 2 G: 2 INJECTION, POWDER, FOR SOLUTION INTRAMUSCULAR; INTRAVENOUS at 22:01

## 2018-11-21 RX ADMIN — KETOROLAC TROMETHAMINE 30 MG: 30 INJECTION, SOLUTION INTRAMUSCULAR; INTRAVENOUS at 22:03

## 2018-11-21 RX ADMIN — MIDAZOLAM HYDROCHLORIDE 2 MG: 5 INJECTION INTRAMUSCULAR; INTRAVENOUS at 23:36

## 2018-11-21 RX ADMIN — PROPOFOL 80 MG: 10 INJECTION, EMULSION INTRAVENOUS at 23:39

## 2018-11-21 RX ADMIN — VANCOMYCIN HYDROCHLORIDE 1250 MG: 5 INJECTION, POWDER, LYOPHILIZED, FOR SOLUTION INTRAVENOUS at 22:27

## 2018-11-21 RX ADMIN — SODIUM CHLORIDE 1000 ML: 9 INJECTION, SOLUTION INTRAVENOUS at 20:40

## 2018-11-21 RX ADMIN — PROPOFOL 40 MG: 10 INJECTION, EMULSION INTRAVENOUS at 23:43

## 2018-11-21 RX ADMIN — MIDAZOLAM HYDROCHLORIDE 2 MG: 5 INJECTION INTRAMUSCULAR; INTRAVENOUS at 23:28

## 2018-11-22 ENCOUNTER — APPOINTMENT (OUTPATIENT)
Dept: GENERAL RADIOLOGY | Facility: HOSPITAL | Age: 58
End: 2018-11-22

## 2018-11-22 ENCOUNTER — APPOINTMENT (OUTPATIENT)
Dept: CARDIOLOGY | Facility: HOSPITAL | Age: 58
End: 2018-11-22
Attending: INTERNAL MEDICINE

## 2018-11-22 PROBLEM — A41.9 SEVERE SEPSIS: Status: ACTIVE | Noted: 2018-11-22

## 2018-11-22 PROBLEM — R65.20 SEVERE SEPSIS (HCC): Status: ACTIVE | Noted: 2018-11-22

## 2018-11-22 LAB
AMMONIA BLD-SCNC: 24 UMOL/L (ref 11–51)
BACTERIA UR QL AUTO: ABNORMAL /HPF
BH CV ECHO MEAS - % IVS THICK: 7.9 %
BH CV ECHO MEAS - % LVPW THICK: 76.7 %
BH CV ECHO MEAS - ACS: 1.7 CM
BH CV ECHO MEAS - AO MAX PG (FULL): 4.8 MMHG
BH CV ECHO MEAS - AO MAX PG: 11.1 MMHG
BH CV ECHO MEAS - AO MEAN PG (FULL): 3.2 MMHG
BH CV ECHO MEAS - AO MEAN PG: 6.5 MMHG
BH CV ECHO MEAS - AO ROOT AREA (BSA CORRECTED): 1.8
BH CV ECHO MEAS - AO ROOT AREA: 6.9 CM^2
BH CV ECHO MEAS - AO ROOT DIAM: 3 CM
BH CV ECHO MEAS - AO V2 MAX: 166.7 CM/SEC
BH CV ECHO MEAS - AO V2 MEAN: 119.4 CM/SEC
BH CV ECHO MEAS - AO V2 VTI: 42.3 CM
BH CV ECHO MEAS - AVA(I,A): 1.7 CM^2
BH CV ECHO MEAS - AVA(I,D): 1.7 CM^2
BH CV ECHO MEAS - AVA(V,A): 1.7 CM^2
BH CV ECHO MEAS - AVA(V,D): 1.7 CM^2
BH CV ECHO MEAS - BSA(HAYCOCK): 1.6 M^2
BH CV ECHO MEAS - BSA: 1.6 M^2
BH CV ECHO MEAS - BZI_BMI: 21.6 KILOGRAMS/M^2
BH CV ECHO MEAS - BZI_METRIC_HEIGHT: 162.6 CM
BH CV ECHO MEAS - BZI_METRIC_WEIGHT: 57.2 KG
BH CV ECHO MEAS - EDV(CUBED): 101.5 ML
BH CV ECHO MEAS - EDV(MOD-SP4): 69 ML
BH CV ECHO MEAS - EDV(TEICH): 100.6 ML
BH CV ECHO MEAS - EF(CUBED): 72.3 %
BH CV ECHO MEAS - EF(MOD-SP4): 63.8 %
BH CV ECHO MEAS - EF(TEICH): 64 %
BH CV ECHO MEAS - ESV(CUBED): 28.1 ML
BH CV ECHO MEAS - ESV(MOD-SP4): 25 ML
BH CV ECHO MEAS - ESV(TEICH): 36.2 ML
BH CV ECHO MEAS - FS: 34.8 %
BH CV ECHO MEAS - IVS/LVPW: 1.1
BH CV ECHO MEAS - IVSD: 1 CM
BH CV ECHO MEAS - IVSS: 1.1 CM
BH CV ECHO MEAS - LA DIMENSION: 3.2 CM
BH CV ECHO MEAS - LA/AO: 1.1
BH CV ECHO MEAS - LV DIASTOLIC VOL/BSA (35-75): 42.9 ML/M^2
BH CV ECHO MEAS - LV MASS(C)D: 152.3 GRAMS
BH CV ECHO MEAS - LV MASS(C)DI: 94.8 GRAMS/M^2
BH CV ECHO MEAS - LV MASS(C)S: 133.4 GRAMS
BH CV ECHO MEAS - LV MASS(C)SI: 83 GRAMS/M^2
BH CV ECHO MEAS - LV MAX PG: 6.3 MMHG
BH CV ECHO MEAS - LV MEAN PG: 3.3 MMHG
BH CV ECHO MEAS - LV SYSTOLIC VOL/BSA (12-30): 15.6 ML/M^2
BH CV ECHO MEAS - LV V1 MAX: 125.6 CM/SEC
BH CV ECHO MEAS - LV V1 MEAN: 84.1 CM/SEC
BH CV ECHO MEAS - LV V1 VTI: 33.4 CM
BH CV ECHO MEAS - LVIDD: 4.7 CM
BH CV ECHO MEAS - LVIDS: 3 CM
BH CV ECHO MEAS - LVLD AP4: 6.7 CM
BH CV ECHO MEAS - LVLS AP4: 5.9 CM
BH CV ECHO MEAS - LVOT AREA (M): 2.3 CM^2
BH CV ECHO MEAS - LVOT AREA: 2.2 CM^2
BH CV ECHO MEAS - LVOT DIAM: 1.7 CM
BH CV ECHO MEAS - LVPWD: 0.91 CM
BH CV ECHO MEAS - LVPWS: 1.6 CM
BH CV ECHO MEAS - MV A MAX VEL: 104.1 CM/SEC
BH CV ECHO MEAS - MV E MAX VEL: 88.8 CM/SEC
BH CV ECHO MEAS - MV E/A: 0.85
BH CV ECHO MEAS - PA ACC SLOPE: 1423 CM/SEC^2
BH CV ECHO MEAS - PA ACC TIME: 0.09 SEC
BH CV ECHO MEAS - PA PR(ACCEL): 37.8 MMHG
BH CV ECHO MEAS - RAP SYSTOLE: 10 MMHG
BH CV ECHO MEAS - RVDD: 1 CM
BH CV ECHO MEAS - RVSP: 34.2 MMHG
BH CV ECHO MEAS - SI(AO): 182.2 ML/M^2
BH CV ECHO MEAS - SI(CUBED): 45.7 ML/M^2
BH CV ECHO MEAS - SI(LVOT): 45.6 ML/M^2
BH CV ECHO MEAS - SI(MOD-SP4): 27.4 ML/M^2
BH CV ECHO MEAS - SI(TEICH): 40.1 ML/M^2
BH CV ECHO MEAS - SV(AO): 292.8 ML
BH CV ECHO MEAS - SV(CUBED): 73.4 ML
BH CV ECHO MEAS - SV(LVOT): 73.3 ML
BH CV ECHO MEAS - SV(MOD-SP4): 44 ML
BH CV ECHO MEAS - SV(TEICH): 64.4 ML
BH CV ECHO MEAS - TR MAX VEL: 245.8 CM/SEC
BILIRUB UR QL STRIP: NEGATIVE
CK MB SERPL-CCNC: 0.97 NG/ML (ref 0–5)
CK MB SERPL-CCNC: 1.26 NG/ML (ref 0–5)
CK MB SERPL-RTO: 0.5 % (ref 0–3)
CK MB SERPL-RTO: 0.7 % (ref 0–3)
CK SERPL-CCNC: 181 U/L (ref 24–173)
CK SERPL-CCNC: 187 U/L (ref 24–173)
CLARITY UR: CLEAR
COLOR UR: YELLOW
D-LACTATE SERPL-SCNC: 2.6 MMOL/L (ref 0.5–2)
GLUCOSE BLDC GLUCOMTR-MCNC: 216 MG/DL (ref 70–130)
GLUCOSE BLDC GLUCOMTR-MCNC: 241 MG/DL (ref 70–130)
GLUCOSE BLDC GLUCOMTR-MCNC: 255 MG/DL (ref 70–130)
GLUCOSE BLDC GLUCOMTR-MCNC: 275 MG/DL (ref 70–130)
GLUCOSE BLDC GLUCOMTR-MCNC: 307 MG/DL (ref 70–130)
GLUCOSE BLDC GLUCOMTR-MCNC: 345 MG/DL (ref 70–130)
GLUCOSE BLDC GLUCOMTR-MCNC: 368 MG/DL (ref 70–130)
GLUCOSE UR STRIP-MCNC: ABNORMAL MG/DL
HBA1C MFR BLD: 11.5 % (ref 4.5–5.7)
HGB UR QL STRIP.AUTO: ABNORMAL
HOLD SPECIMEN: NORMAL
HYALINE CASTS UR QL AUTO: ABNORMAL /LPF
KETONES UR QL STRIP: ABNORMAL
LEUKOCYTE ESTERASE UR QL STRIP.AUTO: NEGATIVE
MAGNESIUM SERPL-MCNC: 2 MG/DL (ref 1.7–2.6)
MAXIMAL PREDICTED HEART RATE: 160 BPM
MYOGLOBIN SERPL-MCNC: 216 NG/ML (ref 0–109)
MYOGLOBIN SERPL-MCNC: 414 NG/ML (ref 0–109)
NITRITE UR QL STRIP: NEGATIVE
PH UR STRIP.AUTO: 5.5 [PH] (ref 5–8)
PHOSPHATE SERPL-MCNC: 2.8 MG/DL (ref 2.7–4.5)
PROT UR QL STRIP: ABNORMAL
RBC # UR: ABNORMAL /HPF
REF LAB TEST METHOD: ABNORMAL
SP GR UR STRIP: 1.03 (ref 1–1.03)
SQUAMOUS #/AREA URNS HPF: ABNORMAL /HPF
STRESS TARGET HR: 136 BPM
TROPONIN I SERPL-MCNC: 0.05 NG/ML
TROPONIN I SERPL-MCNC: 0.06 NG/ML
UROBILINOGEN UR QL STRIP: ABNORMAL
WBC UR QL AUTO: ABNORMAL /HPF

## 2018-11-22 PROCEDURE — 73590 X-RAY EXAM OF LOWER LEG: CPT

## 2018-11-22 PROCEDURE — 25010000002 VANCOMYCIN 5 G RECONSTITUTED SOLUTION 5,000 MG VIAL: Performed by: INTERNAL MEDICINE

## 2018-11-22 PROCEDURE — 73610 X-RAY EXAM OF ANKLE: CPT

## 2018-11-22 PROCEDURE — 82140 ASSAY OF AMMONIA: CPT | Performed by: SPECIALIST

## 2018-11-22 PROCEDURE — 93306 TTE W/DOPPLER COMPLETE: CPT

## 2018-11-22 PROCEDURE — 83036 HEMOGLOBIN GLYCOSYLATED A1C: CPT | Performed by: INTERNAL MEDICINE

## 2018-11-22 PROCEDURE — 83930 ASSAY OF BLOOD OSMOLALITY: CPT | Performed by: INTERNAL MEDICINE

## 2018-11-22 PROCEDURE — 94799 UNLISTED PULMONARY SVC/PX: CPT

## 2018-11-22 PROCEDURE — 73610 X-RAY EXAM OF ANKLE: CPT | Performed by: RADIOLOGY

## 2018-11-22 PROCEDURE — 83605 ASSAY OF LACTIC ACID: CPT | Performed by: PHYSICIAN ASSISTANT

## 2018-11-22 PROCEDURE — 25010000002 HEPARIN (PORCINE) PER 1000 UNITS: Performed by: INTERNAL MEDICINE

## 2018-11-22 PROCEDURE — 73590 X-RAY EXAM OF LOWER LEG: CPT | Performed by: RADIOLOGY

## 2018-11-22 PROCEDURE — 93005 ELECTROCARDIOGRAM TRACING: CPT | Performed by: INTERNAL MEDICINE

## 2018-11-22 PROCEDURE — 82962 GLUCOSE BLOOD TEST: CPT

## 2018-11-22 PROCEDURE — 84100 ASSAY OF PHOSPHORUS: CPT | Performed by: INTERNAL MEDICINE

## 2018-11-22 PROCEDURE — 92610 EVALUATE SWALLOWING FUNCTION: CPT

## 2018-11-22 PROCEDURE — 82550 ASSAY OF CK (CPK): CPT | Performed by: INTERNAL MEDICINE

## 2018-11-22 PROCEDURE — 99291 CRITICAL CARE FIRST HOUR: CPT | Performed by: INTERNAL MEDICINE

## 2018-11-22 PROCEDURE — 83874 ASSAY OF MYOGLOBIN: CPT | Performed by: INTERNAL MEDICINE

## 2018-11-22 PROCEDURE — 84484 ASSAY OF TROPONIN QUANT: CPT | Performed by: INTERNAL MEDICINE

## 2018-11-22 PROCEDURE — 81001 URINALYSIS AUTO W/SCOPE: CPT | Performed by: INTERNAL MEDICINE

## 2018-11-22 PROCEDURE — 63710000001 INSULIN DETEMIR PER 5 UNITS: Performed by: HOSPITALIST

## 2018-11-22 PROCEDURE — G8997 SWALLOW GOAL STATUS: HCPCS

## 2018-11-22 PROCEDURE — 25010000002 MIDAZOLAM PER 1 MG

## 2018-11-22 PROCEDURE — 63710000001 INSULIN ASPART PER 5 UNITS: Performed by: INTERNAL MEDICINE

## 2018-11-22 PROCEDURE — 93306 TTE W/DOPPLER COMPLETE: CPT | Performed by: INTERNAL MEDICINE

## 2018-11-22 PROCEDURE — G8998 SWALLOW D/C STATUS: HCPCS

## 2018-11-22 PROCEDURE — 82553 CREATINE MB FRACTION: CPT | Performed by: INTERNAL MEDICINE

## 2018-11-22 PROCEDURE — G8996 SWALLOW CURRENT STATUS: HCPCS

## 2018-11-22 RX ORDER — SENNA AND DOCUSATE SODIUM 50; 8.6 MG/1; MG/1
2 TABLET, FILM COATED ORAL NIGHTLY
Status: DISCONTINUED | OUTPATIENT
Start: 2018-11-22 | End: 2018-11-25 | Stop reason: HOSPADM

## 2018-11-22 RX ORDER — ASPIRIN 300 MG/1
600 SUPPOSITORY RECTAL EVERY 6 HOURS PRN
Status: DISCONTINUED | OUTPATIENT
Start: 2018-11-22 | End: 2018-11-25 | Stop reason: HOSPADM

## 2018-11-22 RX ORDER — IBUPROFEN 400 MG/1
400 TABLET ORAL EVERY 4 HOURS PRN
Status: DISCONTINUED | OUTPATIENT
Start: 2018-11-22 | End: 2018-11-25 | Stop reason: HOSPADM

## 2018-11-22 RX ORDER — SODIUM CHLORIDE 0.9 % (FLUSH) 0.9 %
3 SYRINGE (ML) INJECTION EVERY 12 HOURS SCHEDULED
Status: DISCONTINUED | OUTPATIENT
Start: 2018-11-22 | End: 2018-11-25 | Stop reason: HOSPADM

## 2018-11-22 RX ORDER — NICOTINE POLACRILEX 4 MG
15 LOZENGE BUCCAL
Status: DISCONTINUED | OUTPATIENT
Start: 2018-11-22 | End: 2018-11-25 | Stop reason: HOSPADM

## 2018-11-22 RX ORDER — ASPIRIN 81 MG/1
81 TABLET ORAL DAILY
Status: DISCONTINUED | OUTPATIENT
Start: 2018-11-22 | End: 2018-11-25 | Stop reason: HOSPADM

## 2018-11-22 RX ORDER — DEXTROSE MONOHYDRATE 25 G/50ML
25 INJECTION, SOLUTION INTRAVENOUS
Status: DISCONTINUED | OUTPATIENT
Start: 2018-11-22 | End: 2018-11-25 | Stop reason: HOSPADM

## 2018-11-22 RX ORDER — SODIUM CHLORIDE 9 MG/ML
50 INJECTION, SOLUTION INTRAVENOUS CONTINUOUS
Status: DISCONTINUED | OUTPATIENT
Start: 2018-11-22 | End: 2018-11-25 | Stop reason: HOSPADM

## 2018-11-22 RX ORDER — NITROGLYCERIN 0.4 MG/1
0.4 TABLET SUBLINGUAL
Status: DISCONTINUED | OUTPATIENT
Start: 2018-11-22 | End: 2018-11-25 | Stop reason: HOSPADM

## 2018-11-22 RX ORDER — ECHINACEA PURPUREA EXTRACT 125 MG
2 TABLET ORAL AS NEEDED
Status: DISCONTINUED | OUTPATIENT
Start: 2018-11-22 | End: 2018-11-25 | Stop reason: HOSPADM

## 2018-11-22 RX ORDER — MIDAZOLAM HYDROCHLORIDE 5 MG/ML
INJECTION INTRAMUSCULAR; INTRAVENOUS
Status: COMPLETED | OUTPATIENT
Start: 2018-11-21 | End: 2018-11-21

## 2018-11-22 RX ORDER — PANTOPRAZOLE SODIUM 40 MG/10ML
40 INJECTION, POWDER, LYOPHILIZED, FOR SOLUTION INTRAVENOUS
Status: DISCONTINUED | OUTPATIENT
Start: 2018-11-22 | End: 2018-11-25 | Stop reason: HOSPADM

## 2018-11-22 RX ORDER — PROPOFOL 10 MG/ML
VIAL (ML) INTRAVENOUS
Status: COMPLETED | OUTPATIENT
Start: 2018-11-21 | End: 2018-11-21

## 2018-11-22 RX ORDER — UREA 10 %
1 LOTION (ML) TOPICAL DAILY
Status: DISCONTINUED | OUTPATIENT
Start: 2018-11-22 | End: 2018-11-25 | Stop reason: HOSPADM

## 2018-11-22 RX ORDER — ACETAMINOPHEN 325 MG/1
650 TABLET ORAL EVERY 6 HOURS PRN
Status: DISCONTINUED | OUTPATIENT
Start: 2018-11-22 | End: 2018-11-25 | Stop reason: HOSPADM

## 2018-11-22 RX ORDER — HEPARIN SODIUM 5000 [USP'U]/ML
5000 INJECTION, SOLUTION INTRAVENOUS; SUBCUTANEOUS EVERY 12 HOURS SCHEDULED
Status: DISCONTINUED | OUTPATIENT
Start: 2018-11-22 | End: 2018-11-25 | Stop reason: HOSPADM

## 2018-11-22 RX ORDER — SODIUM CHLORIDE 0.9 % (FLUSH) 0.9 %
3-10 SYRINGE (ML) INJECTION AS NEEDED
Status: DISCONTINUED | OUTPATIENT
Start: 2018-11-22 | End: 2018-11-25 | Stop reason: HOSPADM

## 2018-11-22 RX ADMIN — SODIUM CHLORIDE 125 ML/HR: 9 INJECTION, SOLUTION INTRAVENOUS at 11:34

## 2018-11-22 RX ADMIN — HEPARIN SODIUM 5000 UNITS: 5000 INJECTION INTRAVENOUS; SUBCUTANEOUS at 02:39

## 2018-11-22 RX ADMIN — Medication 1 TABLET: at 08:50

## 2018-11-22 RX ADMIN — PANTOPRAZOLE SODIUM 40 MG: 40 INJECTION, POWDER, FOR SOLUTION INTRAVENOUS at 06:39

## 2018-11-22 RX ADMIN — INSULIN DETEMIR 10 UNITS: 100 INJECTION, SOLUTION SUBCUTANEOUS at 13:42

## 2018-11-22 RX ADMIN — VANCOMYCIN HYDROCHLORIDE 1000 MG: 5 INJECTION, POWDER, LYOPHILIZED, FOR SOLUTION INTRAVENOUS at 22:28

## 2018-11-22 RX ADMIN — ASPIRIN 81 MG: 81 TABLET ORAL at 08:50

## 2018-11-22 RX ADMIN — INSULIN ASPART 4 UNITS: 100 INJECTION, SOLUTION INTRAVENOUS; SUBCUTANEOUS at 15:31

## 2018-11-22 RX ADMIN — AZTREONAM 2 G: 2 INJECTION, POWDER, FOR SOLUTION INTRAMUSCULAR; INTRAVENOUS at 17:48

## 2018-11-22 RX ADMIN — INSULIN ASPART 4 UNITS: 100 INJECTION, SOLUTION INTRAVENOUS; SUBCUTANEOUS at 11:35

## 2018-11-22 RX ADMIN — HEPARIN SODIUM 5000 UNITS: 5000 INJECTION INTRAVENOUS; SUBCUTANEOUS at 08:50

## 2018-11-22 RX ADMIN — HEPARIN SODIUM 5000 UNITS: 5000 INJECTION INTRAVENOUS; SUBCUTANEOUS at 22:38

## 2018-11-22 RX ADMIN — SODIUM CHLORIDE, PRESERVATIVE FREE 3 ML: 5 INJECTION INTRAVENOUS at 22:38

## 2018-11-22 RX ADMIN — ACETAMINOPHEN 650 MG: 325 TABLET, FILM COATED ORAL at 13:00

## 2018-11-22 RX ADMIN — INSULIN ASPART 6 UNITS: 100 INJECTION, SOLUTION INTRAVENOUS; SUBCUTANEOUS at 08:50

## 2018-11-22 RX ADMIN — INSULIN DETEMIR 10 UNITS: 100 INJECTION, SOLUTION SUBCUTANEOUS at 22:41

## 2018-11-22 RX ADMIN — DEXMEDETOMIDINE HYDROCHLORIDE 0.2 MCG/KG/HR: 100 INJECTION, SOLUTION INTRAVENOUS at 01:15

## 2018-11-22 RX ADMIN — AZTREONAM 2 G: 2 INJECTION, POWDER, FOR SOLUTION INTRAMUSCULAR; INTRAVENOUS at 11:35

## 2018-11-22 RX ADMIN — SODIUM CHLORIDE 125 ML/HR: 9 INJECTION, SOLUTION INTRAVENOUS at 02:34

## 2018-11-22 RX ADMIN — SODIUM CHLORIDE 125 ML/HR: 9 INJECTION, SOLUTION INTRAVENOUS at 22:29

## 2018-11-22 RX ADMIN — AZTREONAM 2 G: 2 INJECTION, POWDER, FOR SOLUTION INTRAMUSCULAR; INTRAVENOUS at 02:39

## 2018-11-22 RX ADMIN — DEXMEDETOMIDINE HYDROCHLORIDE 1.5 MCG/KG/HR: 100 INJECTION, SOLUTION INTRAVENOUS at 04:07

## 2018-11-22 RX ADMIN — ACETAMINOPHEN 650 MG: 325 TABLET, FILM COATED ORAL at 22:40

## 2018-11-22 RX ADMIN — INSULIN ASPART 7 UNITS: 100 INJECTION, SOLUTION INTRAVENOUS; SUBCUTANEOUS at 22:40

## 2018-11-22 RX ADMIN — SODIUM CHLORIDE, PRESERVATIVE FREE 3 ML: 5 INJECTION INTRAVENOUS at 08:52

## 2018-11-22 RX ADMIN — INSULIN ASPART 8 UNITS: 100 INJECTION, SOLUTION INTRAVENOUS; SUBCUTANEOUS at 04:10

## 2018-11-23 LAB
ANION GAP SERPL CALCULATED.3IONS-SCNC: 11.2 MMOL/L (ref 3.6–11.2)
BASOPHILS # BLD AUTO: 0.03 10*3/MM3 (ref 0–0.3)
BASOPHILS NFR BLD AUTO: 0.4 % (ref 0–2)
BUN BLD-MCNC: 24 MG/DL (ref 7–21)
BUN/CREAT SERPL: 18.3 (ref 7–25)
CALCIUM SPEC-SCNC: 8.3 MG/DL (ref 7.7–10)
CHLORIDE SERPL-SCNC: 106 MMOL/L (ref 99–112)
CO2 SERPL-SCNC: 22.8 MMOL/L (ref 24.3–31.9)
CREAT BLD-MCNC: 1.31 MG/DL (ref 0.43–1.29)
DEPRECATED RDW RBC AUTO: 42.3 FL (ref 37–54)
EOSINOPHIL # BLD AUTO: 0.09 10*3/MM3 (ref 0–0.7)
EOSINOPHIL NFR BLD AUTO: 1.2 % (ref 0–5)
ERYTHROCYTE [DISTWIDTH] IN BLOOD BY AUTOMATED COUNT: 13.7 % (ref 11.5–14.5)
GFR SERPL CREATININE-BSD FRML MDRD: 41 ML/MIN/1.73
GLUCOSE BLD-MCNC: 224 MG/DL (ref 70–110)
GLUCOSE BLDC GLUCOMTR-MCNC: 157 MG/DL (ref 70–130)
GLUCOSE BLDC GLUCOMTR-MCNC: 162 MG/DL (ref 70–130)
GLUCOSE BLDC GLUCOMTR-MCNC: 178 MG/DL (ref 70–130)
GLUCOSE BLDC GLUCOMTR-MCNC: 207 MG/DL (ref 70–130)
GLUCOSE BLDC GLUCOMTR-MCNC: 331 MG/DL (ref 70–130)
HCT VFR BLD AUTO: 32 % (ref 37–47)
HGB BLD-MCNC: 10.7 G/DL (ref 12–16)
IMM GRANULOCYTES # BLD: 0.01 10*3/MM3 (ref 0–0.03)
IMM GRANULOCYTES NFR BLD: 0.1 % (ref 0–0.5)
LYMPHOCYTES # BLD AUTO: 2.17 10*3/MM3 (ref 1–3)
LYMPHOCYTES NFR BLD AUTO: 29.5 % (ref 21–51)
MAGNESIUM SERPL-MCNC: 2 MG/DL (ref 1.7–2.6)
MAGNESIUM SERPL-MCNC: 2 MG/DL (ref 1.7–2.6)
MCH RBC QN AUTO: 29.4 PG (ref 27–33)
MCHC RBC AUTO-ENTMCNC: 33.4 G/DL (ref 33–37)
MCV RBC AUTO: 87.9 FL (ref 80–94)
MONOCYTES # BLD AUTO: 0.53 10*3/MM3 (ref 0.1–0.9)
MONOCYTES NFR BLD AUTO: 7.2 % (ref 0–10)
NEUTROPHILS # BLD AUTO: 4.53 10*3/MM3 (ref 1.4–6.5)
NEUTROPHILS NFR BLD AUTO: 61.6 % (ref 30–70)
OSMOLALITY SERPL CALC.SUM OF ELEC: 290.4 MOSM/KG (ref 273–305)
PLATELET # BLD AUTO: 168 10*3/MM3 (ref 130–400)
PMV BLD AUTO: 10.5 FL (ref 6–10)
POTASSIUM BLD-SCNC: 2.7 MMOL/L (ref 3.5–5.3)
RBC # BLD AUTO: 3.64 10*6/MM3 (ref 4.2–5.4)
SODIUM BLD-SCNC: 140 MMOL/L (ref 135–153)
WBC NRBC COR # BLD: 7.36 10*3/MM3 (ref 4.5–12.5)

## 2018-11-23 PROCEDURE — 82962 GLUCOSE BLOOD TEST: CPT

## 2018-11-23 PROCEDURE — 85025 COMPLETE CBC W/AUTO DIFF WBC: CPT | Performed by: HOSPITALIST

## 2018-11-23 PROCEDURE — 63710000001 INSULIN DETEMIR PER 5 UNITS: Performed by: HOSPITALIST

## 2018-11-23 PROCEDURE — 83735 ASSAY OF MAGNESIUM: CPT | Performed by: INTERNAL MEDICINE

## 2018-11-23 PROCEDURE — 25010000002 HEPARIN (PORCINE) PER 1000 UNITS: Performed by: INTERNAL MEDICINE

## 2018-11-23 PROCEDURE — 94799 UNLISTED PULMONARY SVC/PX: CPT

## 2018-11-23 PROCEDURE — 25010000002 HYDRALAZINE PER 20 MG: Performed by: HOSPITALIST

## 2018-11-23 PROCEDURE — 83735 ASSAY OF MAGNESIUM: CPT | Performed by: HOSPITALIST

## 2018-11-23 PROCEDURE — 99232 SBSQ HOSP IP/OBS MODERATE 35: CPT | Performed by: HOSPITALIST

## 2018-11-23 PROCEDURE — 80048 BASIC METABOLIC PNL TOTAL CA: CPT | Performed by: HOSPITALIST

## 2018-11-23 PROCEDURE — 63710000001 INSULIN ASPART PER 5 UNITS: Performed by: INTERNAL MEDICINE

## 2018-11-23 PROCEDURE — 25010000002 VANCOMYCIN 5 G RECONSTITUTED SOLUTION 5,000 MG VIAL: Performed by: INTERNAL MEDICINE

## 2018-11-23 RX ORDER — INSULIN GLARGINE 100 [IU]/ML
30 INJECTION, SOLUTION SUBCUTANEOUS 2 TIMES DAILY
Status: ON HOLD | COMMUNITY
End: 2018-11-25 | Stop reason: SDUPTHER

## 2018-11-23 RX ORDER — ESCITALOPRAM OXALATE 10 MG/1
10 TABLET ORAL NIGHTLY
Status: CANCELLED | OUTPATIENT
Start: 2018-11-23

## 2018-11-23 RX ORDER — ESCITALOPRAM OXALATE 10 MG/1
10 TABLET ORAL NIGHTLY
Status: DISCONTINUED | OUTPATIENT
Start: 2018-11-23 | End: 2018-11-25 | Stop reason: HOSPADM

## 2018-11-23 RX ORDER — ATENOLOL 25 MG/1
25 TABLET ORAL 2 TIMES DAILY
Status: CANCELLED | OUTPATIENT
Start: 2018-11-23

## 2018-11-23 RX ORDER — ATENOLOL 25 MG/1
25 TABLET ORAL 2 TIMES DAILY
COMMUNITY
End: 2019-03-20 | Stop reason: HOSPADM

## 2018-11-23 RX ORDER — FLUDROCORTISONE ACETATE 0.1 MG/1
0.1 TABLET ORAL DAILY
Status: DISCONTINUED | OUTPATIENT
Start: 2018-11-23 | End: 2018-11-23

## 2018-11-23 RX ORDER — FLUDROCORTISONE ACETATE 0.1 MG/1
0.1 TABLET ORAL DAILY
Status: CANCELLED | OUTPATIENT
Start: 2018-11-23

## 2018-11-23 RX ORDER — ATENOLOL 25 MG/1
25 TABLET ORAL 2 TIMES DAILY
Status: DISCONTINUED | OUTPATIENT
Start: 2018-11-23 | End: 2018-11-25 | Stop reason: HOSPADM

## 2018-11-23 RX ORDER — GABAPENTIN 400 MG/1
400 CAPSULE ORAL 3 TIMES DAILY
Status: DISCONTINUED | OUTPATIENT
Start: 2018-11-23 | End: 2018-11-25 | Stop reason: HOSPADM

## 2018-11-23 RX ORDER — ESCITALOPRAM OXALATE 10 MG/1
10 TABLET ORAL NIGHTLY
COMMUNITY
End: 2019-03-20 | Stop reason: HOSPADM

## 2018-11-23 RX ORDER — FLUDROCORTISONE ACETATE 0.1 MG/1
0.1 TABLET ORAL DAILY
COMMUNITY
End: 2019-04-10

## 2018-11-23 RX ORDER — HYDRALAZINE HYDROCHLORIDE 20 MG/ML
10 INJECTION INTRAMUSCULAR; INTRAVENOUS EVERY 4 HOURS PRN
Status: DISCONTINUED | OUTPATIENT
Start: 2018-11-23 | End: 2018-11-25 | Stop reason: HOSPADM

## 2018-11-23 RX ORDER — PANTOPRAZOLE SODIUM 40 MG/1
40 TABLET, DELAYED RELEASE ORAL DAILY
Status: CANCELLED | OUTPATIENT
Start: 2018-11-23

## 2018-11-23 RX ORDER — HYDROXYZINE HYDROCHLORIDE 25 MG/1
25 TABLET, FILM COATED ORAL NIGHTLY PRN
Status: CANCELLED | OUTPATIENT
Start: 2018-11-23

## 2018-11-23 RX ORDER — TEMAZEPAM 15 MG/1
30 CAPSULE ORAL NIGHTLY
Status: DISCONTINUED | OUTPATIENT
Start: 2018-11-23 | End: 2018-11-25 | Stop reason: HOSPADM

## 2018-11-23 RX ORDER — ERGOCALCIFEROL 1.25 MG/1
50000 CAPSULE ORAL WEEKLY
Status: ON HOLD | COMMUNITY
End: 2021-11-05

## 2018-11-23 RX ORDER — POTASSIUM CHLORIDE 7.45 MG/ML
10 INJECTION INTRAVENOUS
Status: DISCONTINUED | OUTPATIENT
Start: 2018-11-23 | End: 2018-11-25 | Stop reason: HOSPADM

## 2018-11-23 RX ORDER — POTASSIUM CHLORIDE 20 MEQ/1
40 TABLET, EXTENDED RELEASE ORAL AS NEEDED
Status: DISCONTINUED | OUTPATIENT
Start: 2018-11-23 | End: 2018-11-25 | Stop reason: HOSPADM

## 2018-11-23 RX ORDER — POTASSIUM CHLORIDE 1.5 G/1.77G
40 POWDER, FOR SOLUTION ORAL AS NEEDED
Status: DISCONTINUED | OUTPATIENT
Start: 2018-11-23 | End: 2018-11-25 | Stop reason: HOSPADM

## 2018-11-23 RX ORDER — POTASSIUM CHLORIDE 20 MEQ/1
40 TABLET, EXTENDED RELEASE ORAL EVERY 4 HOURS
Status: COMPLETED | OUTPATIENT
Start: 2018-11-23 | End: 2018-11-23

## 2018-11-23 RX ORDER — HYDROXYZINE HYDROCHLORIDE 25 MG/1
25 TABLET, FILM COATED ORAL NIGHTLY PRN
COMMUNITY
End: 2018-11-25 | Stop reason: HOSPADM

## 2018-11-23 RX ORDER — FLUOXETINE HYDROCHLORIDE 20 MG/1
20 CAPSULE ORAL DAILY
Status: CANCELLED | OUTPATIENT
Start: 2018-11-23

## 2018-11-23 RX ORDER — PANTOPRAZOLE SODIUM 40 MG/1
40 TABLET, DELAYED RELEASE ORAL DAILY
Status: ON HOLD | COMMUNITY
End: 2022-07-02

## 2018-11-23 RX ORDER — AMLODIPINE BESYLATE 10 MG/1
10 TABLET ORAL
Status: DISCONTINUED | OUTPATIENT
Start: 2018-11-23 | End: 2018-11-25 | Stop reason: HOSPADM

## 2018-11-23 RX ADMIN — INSULIN ASPART 7 UNITS: 100 INJECTION, SOLUTION INTRAVENOUS; SUBCUTANEOUS at 01:54

## 2018-11-23 RX ADMIN — POTASSIUM CHLORIDE 40 MEQ: 1500 TABLET, EXTENDED RELEASE ORAL at 09:09

## 2018-11-23 RX ADMIN — IBUPROFEN 400 MG: 400 TABLET, FILM COATED ORAL at 16:27

## 2018-11-23 RX ADMIN — ACETAMINOPHEN 650 MG: 325 TABLET, FILM COATED ORAL at 05:50

## 2018-11-23 RX ADMIN — SODIUM CHLORIDE, PRESERVATIVE FREE 3 ML: 5 INJECTION INTRAVENOUS at 09:13

## 2018-11-23 RX ADMIN — HEPARIN SODIUM 5000 UNITS: 5000 INJECTION INTRAVENOUS; SUBCUTANEOUS at 09:16

## 2018-11-23 RX ADMIN — POTASSIUM CHLORIDE 40 MEQ: 1500 TABLET, EXTENDED RELEASE ORAL at 16:27

## 2018-11-23 RX ADMIN — SODIUM CHLORIDE 125 ML/HR: 9 INJECTION, SOLUTION INTRAVENOUS at 09:11

## 2018-11-23 RX ADMIN — INSULIN DETEMIR 10 UNITS: 100 INJECTION, SOLUTION SUBCUTANEOUS at 09:08

## 2018-11-23 RX ADMIN — ESCITALOPRAM OXALATE 10 MG: 10 TABLET, FILM COATED ORAL at 20:52

## 2018-11-23 RX ADMIN — Medication 1 TABLET: at 09:09

## 2018-11-23 RX ADMIN — SODIUM CHLORIDE, PRESERVATIVE FREE 3 ML: 5 INJECTION INTRAVENOUS at 20:53

## 2018-11-23 RX ADMIN — INSULIN ASPART 2 UNITS: 100 INJECTION, SOLUTION INTRAVENOUS; SUBCUTANEOUS at 23:39

## 2018-11-23 RX ADMIN — INSULIN ASPART 2 UNITS: 100 INJECTION, SOLUTION INTRAVENOUS; SUBCUTANEOUS at 16:28

## 2018-11-23 RX ADMIN — AZTREONAM 2 G: 2 INJECTION, POWDER, FOR SOLUTION INTRAMUSCULAR; INTRAVENOUS at 17:10

## 2018-11-23 RX ADMIN — AZTREONAM 2 G: 2 INJECTION, POWDER, FOR SOLUTION INTRAMUSCULAR; INTRAVENOUS at 01:54

## 2018-11-23 RX ADMIN — INSULIN DETEMIR 25 UNITS: 100 INJECTION, SOLUTION SUBCUTANEOUS at 20:49

## 2018-11-23 RX ADMIN — AMLODIPINE BESYLATE 10 MG: 10 TABLET ORAL at 06:57

## 2018-11-23 RX ADMIN — INSULIN ASPART 2 UNITS: 100 INJECTION, SOLUTION INTRAVENOUS; SUBCUTANEOUS at 05:50

## 2018-11-23 RX ADMIN — INSULIN ASPART 2 UNITS: 100 INJECTION, SOLUTION INTRAVENOUS; SUBCUTANEOUS at 09:07

## 2018-11-23 RX ADMIN — PANTOPRAZOLE SODIUM 40 MG: 40 INJECTION, POWDER, FOR SOLUTION INTRAVENOUS at 05:50

## 2018-11-23 RX ADMIN — INSULIN ASPART 4 UNITS: 100 INJECTION, SOLUTION INTRAVENOUS; SUBCUTANEOUS at 12:28

## 2018-11-23 RX ADMIN — HYDRALAZINE HYDROCHLORIDE 10 MG: 20 INJECTION INTRAMUSCULAR; INTRAVENOUS at 17:05

## 2018-11-23 RX ADMIN — TEMAZEPAM 30 MG: 15 CAPSULE ORAL at 21:29

## 2018-11-23 RX ADMIN — HEPARIN SODIUM 5000 UNITS: 5000 INJECTION INTRAVENOUS; SUBCUTANEOUS at 20:52

## 2018-11-23 RX ADMIN — ATENOLOL 25 MG: 25 TABLET ORAL at 16:27

## 2018-11-23 RX ADMIN — HYDRALAZINE HYDROCHLORIDE 10 MG: 20 INJECTION INTRAMUSCULAR; INTRAVENOUS at 21:29

## 2018-11-23 RX ADMIN — FLUDROCORTISONE ACETATE 0.1 MG: 0.1 TABLET ORAL at 16:27

## 2018-11-23 RX ADMIN — ASPIRIN 81 MG: 81 TABLET ORAL at 09:09

## 2018-11-23 RX ADMIN — POTASSIUM CHLORIDE 40 MEQ: 1500 TABLET, EXTENDED RELEASE ORAL at 12:27

## 2018-11-23 RX ADMIN — VANCOMYCIN HYDROCHLORIDE 1000 MG: 5 INJECTION, POWDER, LYOPHILIZED, FOR SOLUTION INTRAVENOUS at 21:30

## 2018-11-23 RX ADMIN — SENNOSIDES AND DOCUSATE SODIUM 2 TABLET: 8.6; 5 TABLET ORAL at 20:52

## 2018-11-23 RX ADMIN — AZTREONAM 2 G: 2 INJECTION, POWDER, FOR SOLUTION INTRAMUSCULAR; INTRAVENOUS at 09:08

## 2018-11-23 RX ADMIN — INSULIN ASPART 6 UNITS: 100 INJECTION, SOLUTION INTRAVENOUS; SUBCUTANEOUS at 20:50

## 2018-11-23 RX ADMIN — GABAPENTIN 400 MG: 400 CAPSULE ORAL at 23:32

## 2018-11-23 RX ADMIN — SALINE NASAL SPRAY 2 SPRAY: 1.5 SOLUTION NASAL at 02:00

## 2018-11-24 ENCOUNTER — APPOINTMENT (OUTPATIENT)
Dept: GENERAL RADIOLOGY | Facility: HOSPITAL | Age: 58
End: 2018-11-24

## 2018-11-24 LAB
ANION GAP SERPL CALCULATED.3IONS-SCNC: 9.8 MMOL/L (ref 3.6–11.2)
BASOPHILS # BLD AUTO: 0.03 10*3/MM3 (ref 0–0.3)
BASOPHILS NFR BLD AUTO: 0.4 % (ref 0–2)
BUN BLD-MCNC: 14 MG/DL (ref 7–21)
BUN/CREAT SERPL: 16.7 (ref 7–25)
CALCIUM SPEC-SCNC: 8.4 MG/DL (ref 7.7–10)
CHLORIDE SERPL-SCNC: 109 MMOL/L (ref 99–112)
CO2 SERPL-SCNC: 21.2 MMOL/L (ref 24.3–31.9)
CREAT BLD-MCNC: 0.84 MG/DL (ref 0.43–1.29)
DEPRECATED RDW RBC AUTO: 46.8 FL (ref 37–54)
EOSINOPHIL # BLD AUTO: 0.15 10*3/MM3 (ref 0–0.7)
EOSINOPHIL NFR BLD AUTO: 1.8 % (ref 0–5)
ERYTHROCYTE [DISTWIDTH] IN BLOOD BY AUTOMATED COUNT: 14.3 % (ref 11.5–14.5)
GFR SERPL CREATININE-BSD FRML MDRD: 69 ML/MIN/1.73
GLUCOSE BLD-MCNC: 165 MG/DL (ref 70–110)
GLUCOSE BLDC GLUCOMTR-MCNC: 147 MG/DL (ref 70–130)
GLUCOSE BLDC GLUCOMTR-MCNC: 274 MG/DL (ref 70–130)
GLUCOSE BLDC GLUCOMTR-MCNC: 312 MG/DL (ref 70–130)
GLUCOSE BLDC GLUCOMTR-MCNC: 318 MG/DL (ref 70–130)
GLUCOSE BLDC GLUCOMTR-MCNC: 382 MG/DL (ref 70–130)
GLUCOSE BLDC GLUCOMTR-MCNC: 75 MG/DL (ref 70–130)
HCT VFR BLD AUTO: 36.2 % (ref 37–47)
HGB BLD-MCNC: 11.8 G/DL (ref 12–16)
IMM GRANULOCYTES # BLD: 0.02 10*3/MM3 (ref 0–0.03)
IMM GRANULOCYTES NFR BLD: 0.2 % (ref 0–0.5)
LYMPHOCYTES # BLD AUTO: 2.64 10*3/MM3 (ref 1–3)
LYMPHOCYTES NFR BLD AUTO: 32.2 % (ref 21–51)
MCH RBC QN AUTO: 29.3 PG (ref 27–33)
MCHC RBC AUTO-ENTMCNC: 32.6 G/DL (ref 33–37)
MCV RBC AUTO: 89.8 FL (ref 80–94)
MONOCYTES # BLD AUTO: 0.73 10*3/MM3 (ref 0.1–0.9)
MONOCYTES NFR BLD AUTO: 8.9 % (ref 0–10)
NEUTROPHILS # BLD AUTO: 4.63 10*3/MM3 (ref 1.4–6.5)
NEUTROPHILS NFR BLD AUTO: 56.5 % (ref 30–70)
OSMOLALITY SERPL CALC.SUM OF ELEC: 283.6 MOSM/KG (ref 273–305)
OSMOLALITY SERPL: 297 MOSMOL/KG (ref 275–295)
PLATELET # BLD AUTO: 169 10*3/MM3 (ref 130–400)
PMV BLD AUTO: 10.4 FL (ref 6–10)
POTASSIUM BLD-SCNC: 3.6 MMOL/L (ref 3.5–5.3)
RBC # BLD AUTO: 4.03 10*6/MM3 (ref 4.2–5.4)
SODIUM BLD-SCNC: 140 MMOL/L (ref 135–153)
WBC NRBC COR # BLD: 8.2 10*3/MM3 (ref 4.5–12.5)

## 2018-11-24 PROCEDURE — 25010000002 HEPARIN (PORCINE) PER 1000 UNITS: Performed by: INTERNAL MEDICINE

## 2018-11-24 PROCEDURE — 80048 BASIC METABOLIC PNL TOTAL CA: CPT | Performed by: HOSPITALIST

## 2018-11-24 PROCEDURE — 63710000001 INSULIN ASPART PER 5 UNITS: Performed by: INTERNAL MEDICINE

## 2018-11-24 PROCEDURE — 63710000001 INSULIN DETEMIR PER 5 UNITS: Performed by: HOSPITALIST

## 2018-11-24 PROCEDURE — 71045 X-RAY EXAM CHEST 1 VIEW: CPT

## 2018-11-24 PROCEDURE — 71045 X-RAY EXAM CHEST 1 VIEW: CPT | Performed by: RADIOLOGY

## 2018-11-24 PROCEDURE — 99232 SBSQ HOSP IP/OBS MODERATE 35: CPT | Performed by: HOSPITALIST

## 2018-11-24 PROCEDURE — 82962 GLUCOSE BLOOD TEST: CPT

## 2018-11-24 PROCEDURE — 85025 COMPLETE CBC W/AUTO DIFF WBC: CPT | Performed by: HOSPITALIST

## 2018-11-24 RX ADMIN — ATENOLOL 25 MG: 25 TABLET ORAL at 20:18

## 2018-11-24 RX ADMIN — AMLODIPINE BESYLATE 10 MG: 10 TABLET ORAL at 08:42

## 2018-11-24 RX ADMIN — SODIUM CHLORIDE 50 ML/HR: 9 INJECTION, SOLUTION INTRAVENOUS at 04:07

## 2018-11-24 RX ADMIN — GABAPENTIN 400 MG: 400 CAPSULE ORAL at 08:42

## 2018-11-24 RX ADMIN — INSULIN ASPART 8 UNITS: 100 INJECTION, SOLUTION INTRAVENOUS; SUBCUTANEOUS at 15:47

## 2018-11-24 RX ADMIN — INSULIN ASPART 7 UNITS: 100 INJECTION, SOLUTION INTRAVENOUS; SUBCUTANEOUS at 20:32

## 2018-11-24 RX ADMIN — TEMAZEPAM 30 MG: 15 CAPSULE ORAL at 20:18

## 2018-11-24 RX ADMIN — AZTREONAM 2 G: 2 INJECTION, POWDER, FOR SOLUTION INTRAMUSCULAR; INTRAVENOUS at 02:05

## 2018-11-24 RX ADMIN — ATENOLOL 25 MG: 25 TABLET ORAL at 08:42

## 2018-11-24 RX ADMIN — DOXYCYCLINE 100 MG: 100 INJECTION, POWDER, LYOPHILIZED, FOR SOLUTION INTRAVENOUS at 15:10

## 2018-11-24 RX ADMIN — GABAPENTIN 400 MG: 400 CAPSULE ORAL at 15:47

## 2018-11-24 RX ADMIN — SODIUM CHLORIDE, PRESERVATIVE FREE 3 ML: 5 INJECTION INTRAVENOUS at 08:41

## 2018-11-24 RX ADMIN — HEPARIN SODIUM 5000 UNITS: 5000 INJECTION INTRAVENOUS; SUBCUTANEOUS at 20:17

## 2018-11-24 RX ADMIN — Medication 1 TABLET: at 08:42

## 2018-11-24 RX ADMIN — GABAPENTIN 400 MG: 400 CAPSULE ORAL at 20:18

## 2018-11-24 RX ADMIN — INSULIN DETEMIR 25 UNITS: 100 INJECTION, SOLUTION SUBCUTANEOUS at 22:40

## 2018-11-24 RX ADMIN — DOXYCYCLINE 100 MG: 100 INJECTION, POWDER, LYOPHILIZED, FOR SOLUTION INTRAVENOUS at 22:40

## 2018-11-24 RX ADMIN — ASPIRIN 81 MG: 81 TABLET ORAL at 08:42

## 2018-11-24 RX ADMIN — PANTOPRAZOLE SODIUM 40 MG: 40 INJECTION, POWDER, FOR SOLUTION INTRAVENOUS at 05:37

## 2018-11-24 RX ADMIN — INSULIN DETEMIR 25 UNITS: 100 INJECTION, SOLUTION SUBCUTANEOUS at 08:42

## 2018-11-24 RX ADMIN — ESCITALOPRAM OXALATE 10 MG: 10 TABLET, FILM COATED ORAL at 20:18

## 2018-11-24 RX ADMIN — HEPARIN SODIUM 5000 UNITS: 5000 INJECTION INTRAVENOUS; SUBCUTANEOUS at 08:42

## 2018-11-24 RX ADMIN — SODIUM CHLORIDE, PRESERVATIVE FREE 3 ML: 5 INJECTION INTRAVENOUS at 20:33

## 2018-11-25 VITALS
TEMPERATURE: 98.1 F | RESPIRATION RATE: 20 BRPM | OXYGEN SATURATION: 98 % | SYSTOLIC BLOOD PRESSURE: 162 MMHG | HEART RATE: 75 BPM | BODY MASS INDEX: 22.77 KG/M2 | WEIGHT: 133.4 LBS | DIASTOLIC BLOOD PRESSURE: 89 MMHG | HEIGHT: 64 IN

## 2018-11-25 PROBLEM — L03.116 CELLULITIS OF FOOT, LEFT: Status: RESOLVED | Noted: 2017-04-12 | Resolved: 2018-11-25

## 2018-11-25 PROBLEM — L02.619 CELLULITIS AND ABSCESS OF FOOT: Status: RESOLVED | Noted: 2017-05-26 | Resolved: 2018-11-25

## 2018-11-25 PROBLEM — E11.621 DIABETIC FOOT ULCERS: Status: RESOLVED | Noted: 2017-06-21 | Resolved: 2018-11-25

## 2018-11-25 PROBLEM — L97.509 DIABETIC FOOT ULCERS: Status: RESOLVED | Noted: 2017-06-21 | Resolved: 2018-11-25

## 2018-11-25 PROBLEM — L03.119 CELLULITIS AND ABSCESS OF FOOT: Status: RESOLVED | Noted: 2017-05-26 | Resolved: 2018-11-25

## 2018-11-25 LAB
CRP SERPL-MCNC: 0.65 MG/DL (ref 0–0.99)
D-LACTATE SERPL-SCNC: 1.5 MMOL/L (ref 0.5–2)
GLUCOSE BLDC GLUCOMTR-MCNC: 108 MG/DL (ref 70–130)
GLUCOSE BLDC GLUCOMTR-MCNC: 195 MG/DL (ref 70–130)
MAGNESIUM SERPL-MCNC: 1.8 MG/DL (ref 1.7–2.6)
POTASSIUM BLD-SCNC: 3.6 MMOL/L (ref 3.5–5.3)

## 2018-11-25 PROCEDURE — 83735 ASSAY OF MAGNESIUM: CPT | Performed by: INTERNAL MEDICINE

## 2018-11-25 PROCEDURE — 86140 C-REACTIVE PROTEIN: CPT | Performed by: HOSPITALIST

## 2018-11-25 PROCEDURE — 63710000001 INSULIN ASPART PER 5 UNITS: Performed by: INTERNAL MEDICINE

## 2018-11-25 PROCEDURE — 99239 HOSP IP/OBS DSCHRG MGMT >30: CPT | Performed by: HOSPITALIST

## 2018-11-25 PROCEDURE — 84132 ASSAY OF SERUM POTASSIUM: CPT | Performed by: INTERNAL MEDICINE

## 2018-11-25 PROCEDURE — 63710000001 INSULIN DETEMIR PER 5 UNITS: Performed by: HOSPITALIST

## 2018-11-25 PROCEDURE — 94799 UNLISTED PULMONARY SVC/PX: CPT

## 2018-11-25 PROCEDURE — 83605 ASSAY OF LACTIC ACID: CPT | Performed by: HOSPITALIST

## 2018-11-25 PROCEDURE — 25010000002 HEPARIN (PORCINE) PER 1000 UNITS: Performed by: INTERNAL MEDICINE

## 2018-11-25 PROCEDURE — 82962 GLUCOSE BLOOD TEST: CPT

## 2018-11-25 RX ORDER — HYDROCODONE BITARTRATE AND ACETAMINOPHEN 10; 325 MG/1; MG/1
0.5 TABLET ORAL EVERY 6 HOURS PRN
Qty: 30 TABLET | Refills: 0
Start: 2018-11-25 | End: 2019-03-15 | Stop reason: DRUGHIGH

## 2018-11-25 RX ORDER — SENNA AND DOCUSATE SODIUM 50; 8.6 MG/1; MG/1
1 TABLET, FILM COATED ORAL 2 TIMES DAILY
Qty: 60 TABLET | Refills: 0 | Status: ON HOLD | OUTPATIENT
Start: 2018-11-25 | End: 2019-03-16

## 2018-11-25 RX ORDER — DOXYCYCLINE HYCLATE 100 MG/1
100 TABLET, DELAYED RELEASE ORAL 2 TIMES DAILY
Qty: 10 TABLET | Refills: 0 | Status: ON HOLD | OUTPATIENT
Start: 2018-11-25 | End: 2018-12-25

## 2018-11-25 RX ORDER — AMLODIPINE BESYLATE 10 MG/1
10 TABLET ORAL
Qty: 30 TABLET | Refills: 3 | Status: SHIPPED | OUTPATIENT
Start: 2018-11-26 | End: 2019-03-20 | Stop reason: HOSPADM

## 2018-11-25 RX ORDER — INSULIN GLARGINE 100 [IU]/ML
25 INJECTION, SOLUTION SUBCUTANEOUS 2 TIMES DAILY
Qty: 1 ML | Refills: 12 | Status: ON HOLD | OUTPATIENT
Start: 2018-11-25 | End: 2019-08-08 | Stop reason: SDUPTHER

## 2018-11-25 RX ORDER — GABAPENTIN 800 MG/1
400 TABLET ORAL 3 TIMES DAILY
Qty: 90 TABLET | Refills: 0 | Status: SHIPPED | OUTPATIENT
Start: 2018-11-25 | End: 2019-03-15 | Stop reason: DRUGHIGH

## 2018-11-25 RX ORDER — POTASSIUM CHLORIDE 20 MEQ/1
40 TABLET, EXTENDED RELEASE ORAL EVERY 4 HOURS
Status: DISCONTINUED | OUTPATIENT
Start: 2018-11-25 | End: 2018-11-25 | Stop reason: HOSPADM

## 2018-11-25 RX ADMIN — AMLODIPINE BESYLATE 10 MG: 10 TABLET ORAL at 09:03

## 2018-11-25 RX ADMIN — DOXYCYCLINE 100 MG: 100 INJECTION, POWDER, LYOPHILIZED, FOR SOLUTION INTRAVENOUS at 09:02

## 2018-11-25 RX ADMIN — HEPARIN SODIUM 5000 UNITS: 5000 INJECTION INTRAVENOUS; SUBCUTANEOUS at 09:03

## 2018-11-25 RX ADMIN — INSULIN DETEMIR 25 UNITS: 100 INJECTION, SOLUTION SUBCUTANEOUS at 08:00

## 2018-11-25 RX ADMIN — PANTOPRAZOLE SODIUM 40 MG: 40 INJECTION, POWDER, FOR SOLUTION INTRAVENOUS at 05:14

## 2018-11-25 RX ADMIN — ATENOLOL 25 MG: 25 TABLET ORAL at 09:03

## 2018-11-25 RX ADMIN — Medication 1 TABLET: at 09:03

## 2018-11-25 RX ADMIN — POTASSIUM CHLORIDE 40 MEQ: 1500 TABLET, EXTENDED RELEASE ORAL at 09:01

## 2018-11-25 RX ADMIN — GABAPENTIN 400 MG: 400 CAPSULE ORAL at 09:01

## 2018-11-25 RX ADMIN — SODIUM CHLORIDE, PRESERVATIVE FREE 3 ML: 5 INJECTION INTRAVENOUS at 09:04

## 2018-11-25 RX ADMIN — INSULIN ASPART 2 UNITS: 100 INJECTION, SOLUTION INTRAVENOUS; SUBCUTANEOUS at 09:02

## 2018-11-25 RX ADMIN — ASPIRIN 81 MG: 81 TABLET ORAL at 09:03

## 2018-11-26 ENCOUNTER — READMISSION MANAGEMENT (OUTPATIENT)
Dept: CALL CENTER | Facility: HOSPITAL | Age: 58
End: 2018-11-26

## 2018-11-26 ENCOUNTER — TELEPHONE (OUTPATIENT)
Dept: TELEMETRY | Facility: HOSPITAL | Age: 58
End: 2018-11-26

## 2018-11-26 LAB
BACTERIA SPEC AEROBE CULT: NORMAL
BACTERIA SPEC AEROBE CULT: NORMAL

## 2018-11-26 NOTE — OUTREACH NOTE
Prep Survey      Responses   Facility patient discharged from?  [facility]   Is patient eligible?  Yes   Discharge diagnosis  Acute metabolic encephalopathy, acute gastroenteritis probably viral, probable right-sided pneumonia   Does the patient have one of the following disease processes/diagnoses(primary or secondary)?  Sepsis   Does the patient have Home health ordered?  No   What is the Home health agency?   has 24 hour caregivers   Is there a DME ordered?  No   What DME was ordered?  already has DMS at home-wheelchair, rolling walker,  bedside commode and shower chair   Prep survey completed?  Yes          Megan Noel RN

## 2018-11-28 ENCOUNTER — READMISSION MANAGEMENT (OUTPATIENT)
Dept: CALL CENTER | Facility: HOSPITAL | Age: 58
End: 2018-11-28

## 2018-11-28 NOTE — OUTREACH NOTE
Sepsis Week 1 Survey      Responses   Facility patient discharged from?  [facility]   Does the patient have one of the following disease processes/diagnoses(primary or secondary)?  Sepsis   Is there a successful TCM telephone encounter documented?  No   Week 1 attempt successful?  No   Unsuccessful attempts  Attempt 1          Rika Magallanes RN

## 2018-11-29 ENCOUNTER — READMISSION MANAGEMENT (OUTPATIENT)
Dept: CALL CENTER | Facility: HOSPITAL | Age: 58
End: 2018-11-29

## 2018-11-29 NOTE — OUTREACH NOTE
Sepsis Week 1 Survey      Responses   Facility patient discharged from?  [facility]   Does the patient have one of the following disease processes/diagnoses(primary or secondary)?  Sepsis   Is there a successful TCM telephone encounter documented?  No   Week 1 attempt successful?  Yes   Call start time  1508   Call end time  1524   Discharge diagnosis  Acute metabolic encephalopathy, acute gastroenteritis probably viral, probable right-sided pneumonia   Meds reviewed with patient/caregiver?  Yes   Is the patient having any side effects they believe may be caused by any medication additions or changes?  No   Does the patient have all medications related to this admission filled (includes all antibiotics, inhalers, nebulizers,steroids,etc.)  Yes   Is the patient taking all medications as directed (includes completed medication regime)?  Yes   Comments regarding appointments  Orthopaedist appt today 11/28/2018   Does the patient have a primary care provider?   Yes   Comments regarding PCP  Has a followup scheduled next week with Dr Paulson   Does the patient have an appointment with their PCP within 7 days of discharge?  Greater than 7 days   What is preventing the patient from scheduling follow up appointments within 7 days of discharge?  Earlier appointment not available   Nursing Interventions  Verified appointment date/time/provider, Educated patient on importance of making appointment, Advised patient to make appointment   Has the patient kept scheduled appointments due by today?  Yes   What is the Home health agency?   -   Psychosocial issues?  Yes   Did the patient receive a copy of their discharge instructions?  Yes   Nursing interventions  Reviewed instructions with patient   What is the patient's perception of their health status since discharge?  Improving   Nursing interventions  Nurse provided patient education   Is the patient/caregiver able to teach back Sepsis?  S - Shivering,fever or very cold, E - Extreme  pain or generalized discomfort (worst ever,especially abdomen), P - Pale or discolored skin, S - Sleepy, difficult to arouse,confused, I -   I feel like I might die-a feeling of hopelessness, S - Short of breath   Nursing interventions  Nurse provided reassurance to patient, Nurse provided patient education   Is patient/caregiver able to teach back steps to recovery at home?  Set small, achievable goals for return to baseline health, Rest and regain strength, Talk about feelings with family/friends, Make a list of questions for PCP appoinment   Is the patient/caregiver able to teach back signs and symptoms of worsening condition:  Fever, Hyperthermia, Rapid heart rate (>90), Shortness of breath/rapid respiratory rate, Altered mental status(confusion/coma), Edema, High blood glucose without diabetes   Is the patient/caregiver able to teach back the hierarchy of who to call/visit for symptoms/problems? PCP, Specialist, Home health nurse, Urgent Care, ED, 911  Yes   Week 1 call completed?  Yes          Harry Collier RN

## 2018-12-07 ENCOUNTER — READMISSION MANAGEMENT (OUTPATIENT)
Dept: CALL CENTER | Facility: HOSPITAL | Age: 58
End: 2018-12-07

## 2018-12-07 NOTE — OUTREACH NOTE
Sepsis Week 2 Survey      Responses   Facility patient discharged from?  [facility]   Does the patient have one of the following disease processes/diagnoses(primary or secondary)?  Sepsis   Week 2 attempt successful?  Yes   Call start time  0834   Call end time  0842   Discharge diagnosis  Acute metabolic encephalopathy, acute gastroenteritis probably viral, probable right-sided pneumonia   Meds reviewed with patient/caregiver?  Yes   Is the patient taking all medications as directed (includes completed medication regime)?  Yes   Comments regarding appointments  Orthopaedist appt today 11/28/2018   Does the patient have a primary care provider?   Yes   Has the patient kept scheduled appointments due by today?  Yes   Comments  Pt had a new cast put on yesterday due to having a wound on her shin. Wound being closely monitered.   Week 2 call completed?  Yes          Rika Magallanes RN

## 2018-12-15 ENCOUNTER — READMISSION MANAGEMENT (OUTPATIENT)
Dept: CALL CENTER | Facility: HOSPITAL | Age: 58
End: 2018-12-15

## 2018-12-15 NOTE — OUTREACH NOTE
Sepsis Week 3 Survey      Responses   Facility patient discharged from?  [facility]   Does the patient have one of the following disease processes/diagnoses(primary or secondary)?  Sepsis   Week 3 attempt successful?  No   Unsuccessful attempts  Attempt 1          Camilla Kelly RN

## 2018-12-17 ENCOUNTER — READMISSION MANAGEMENT (OUTPATIENT)
Dept: CALL CENTER | Facility: HOSPITAL | Age: 58
End: 2018-12-17

## 2018-12-17 NOTE — OUTREACH NOTE
Sepsis Week 3 Survey      Responses   Facility patient discharged from?  [facility]   Does the patient have one of the following disease processes/diagnoses(primary or secondary)?  Sepsis   Week 3 attempt successful?  Yes   Call start time  1642   Call end time  1652   Discharge diagnosis  Acute metabolic encephalopathy, acute gastroenteritis probably viral, probable right-sided pneumonia   Is patient permission given to speak with other caregiver?  No   Meds reviewed with patient/caregiver?  Yes   Is the patient having any side effects they believe may be caused by any medication additions or changes?  No   Does the patient have all medications related to this admission filled (includes all antibiotics, inhalers, nebulizers,steroids,etc.)  Yes   Is the patient taking all medications as directed (includes completed medication regime)?  Yes   Does the patient have a primary care provider?   Yes   Comments regarding PCP  Yandel Paulson MD   Does the patient have an appointment with their PCP within 7 days of discharge?  Greater than 7 days   What is preventing the patient from scheduling follow up appointments within 7 days of discharge?  Earlier appointment not available   Nursing Interventions  Verified appointment date/time/provider   Has the patient kept scheduled appointments due by today?  Yes   Comments  Patient to return and have new cast put on this thurs. 12/20/18.    Has home health visited the patient within 72 hours of discharge?  N/A   What DME was ordered?  already has DMS at home-wheelchair, rolling walker,  bedside commode and shower chair   Did the patient receive a copy of their discharge instructions?  Yes   Nursing interventions  Reviewed instructions with patient   What is the patient's perception of their health status since discharge?  Improving   Nursing interventions  Nurse provided patient education   Is the patient/caregiver able to teach back Sepsis?  S - Shivering,fever or very cold, E -  Extreme pain or generalized discomfort (worst ever,especially abdomen), P - Pale or discolored skin, S - Sleepy, difficult to arouse,confused, I -   I feel like I might die-a feeling of hopelessness, S - Short of breath   Nursing interventions  Nurse provided reassurance to patient, Nurse provided patient education   Is patient/caregiver able to teach back steps to recovery at home?  Set small, achievable goals for return to baseline health, Rest and regain strength   Is the patient/caregiver able to teach back signs and symptoms of worsening condition:  Fever, Hyperthermia, Rapid heart rate (>90), Shortness of breath/rapid respiratory rate, Altered mental status(confusion/coma), Edema, High blood glucose without diabetes   Is the patient/caregiver able to teach back the hierarchy of who to call/visit for symptoms/problems? PCP, Specialist, Home health nurse, Urgent Care, ED, 911  Yes   Week 3 call completed?  Yes          Charu Soto RN

## 2018-12-25 ENCOUNTER — APPOINTMENT (OUTPATIENT)
Dept: GENERAL RADIOLOGY | Facility: HOSPITAL | Age: 58
End: 2018-12-25

## 2018-12-25 ENCOUNTER — HOSPITAL ENCOUNTER (OUTPATIENT)
Facility: HOSPITAL | Age: 58
Discharge: HOME | End: 2018-12-30
Attending: EMERGENCY MEDICINE | Admitting: ORTHOPAEDIC SURGERY

## 2018-12-25 DIAGNOSIS — S82.401A CLOSED FRACTURE OF RIGHT TIBIA AND FIBULA, INITIAL ENCOUNTER: Primary | ICD-10-CM

## 2018-12-25 DIAGNOSIS — S82.201A CLOSED FRACTURE OF RIGHT TIBIA AND FIBULA, INITIAL ENCOUNTER: Primary | ICD-10-CM
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
## 2018-12-25 LAB
ALBUMIN SERPL-MCNC: 3 G/DL (ref 3.4–4.8)
ALBUMIN/GLOB SERPL: 1.3 G/DL (ref 1.5–2.5)
ALP SERPL-CCNC: 146 U/L (ref 35–104)
ALT SERPL W P-5'-P-CCNC: 16 U/L (ref 10–36)
ANION GAP SERPL CALCULATED.3IONS-SCNC: 5.9 MMOL/L (ref 3.6–11.2)
AST SERPL-CCNC: 14 U/L (ref 10–30)
BASOPHILS # BLD AUTO: 0.01 10*3/MM3 (ref 0–0.3)
BASOPHILS NFR BLD AUTO: 0.3 % (ref 0–2)
BILIRUB SERPL-MCNC: 0.7 MG/DL (ref 0.2–1.8)
BUN BLD-MCNC: 29 MG/DL (ref 7–21)
BUN/CREAT SERPL: 22 (ref 7–25)
CALCIUM SPEC-SCNC: 7.7 MG/DL (ref 7.7–10)
CHLORIDE SERPL-SCNC: 105 MMOL/L (ref 99–112)
CO2 SERPL-SCNC: 25.1 MMOL/L (ref 24.3–31.9)
CREAT BLD-MCNC: 1.32 MG/DL (ref 0.43–1.29)
DEPRECATED RDW RBC AUTO: 45.3 FL (ref 37–54)
EOSINOPHIL # BLD AUTO: 0.2 10*3/MM3 (ref 0–0.7)
EOSINOPHIL NFR BLD AUTO: 5 % (ref 0–5)
ERYTHROCYTE [DISTWIDTH] IN BLOOD BY AUTOMATED COUNT: 14.1 % (ref 11.5–14.5)
GFR SERPL CREATININE-BSD FRML MDRD: 41 ML/MIN/1.73
GLOBULIN UR ELPH-MCNC: 2.3 GM/DL
GLUCOSE BLD-MCNC: 424 MG/DL (ref 70–110)
GLUCOSE BLDC GLUCOMTR-MCNC: 354 MG/DL (ref 70–130)
HCT VFR BLD AUTO: 23.5 % (ref 37–47)
HGB BLD-MCNC: 7.4 G/DL (ref 12–16)
IMM GRANULOCYTES # BLD AUTO: 0.01 10*3/MM3 (ref 0–0.03)
IMM GRANULOCYTES NFR BLD AUTO: 0.3 % (ref 0–0.5)
LYMPHOCYTES # BLD AUTO: 0.99 10*3/MM3 (ref 1–3)
LYMPHOCYTES NFR BLD AUTO: 24.8 % (ref 21–51)
MAGNESIUM SERPL-MCNC: 2.2 MG/DL (ref 1.7–2.6)
MCH RBC QN AUTO: 29.1 PG (ref 27–33)
MCHC RBC AUTO-ENTMCNC: 31.5 G/DL (ref 33–37)
MCV RBC AUTO: 92.5 FL (ref 80–94)
MONOCYTES # BLD AUTO: 0.49 10*3/MM3 (ref 0.1–0.9)
MONOCYTES NFR BLD AUTO: 12.3 % (ref 0–10)
NEUTROPHILS # BLD AUTO: 2.3 10*3/MM3 (ref 1.4–6.5)
NEUTROPHILS NFR BLD AUTO: 57.3 % (ref 30–70)
OSMOLALITY SERPL CALC.SUM OF ELEC: 295.9 MOSM/KG (ref 273–305)
PLATELET # BLD AUTO: 157 10*3/MM3 (ref 130–400)
PMV BLD AUTO: 9.7 FL (ref 6–10)
POTASSIUM BLD-SCNC: 4.2 MMOL/L (ref 3.5–5.3)
PROT SERPL-MCNC: 5.3 G/DL (ref 6–8)
RBC # BLD AUTO: 2.54 10*6/MM3 (ref 4.2–5.4)
SODIUM BLD-SCNC: 136 MMOL/L (ref 135–153)
WBC NRBC COR # BLD: 4 10*3/MM3 (ref 4.5–12.5)

## 2018-12-25 PROCEDURE — 99223 1ST HOSP IP/OBS HIGH 75: CPT | Performed by: HOSPITALIST

## 2018-12-25 PROCEDURE — 73610 X-RAY EXAM OF ANKLE: CPT

## 2018-12-25 PROCEDURE — G0378 HOSPITAL OBSERVATION PER HR: HCPCS

## 2018-12-25 PROCEDURE — 94799 UNLISTED PULMONARY SVC/PX: CPT

## 2018-12-25 PROCEDURE — 71045 X-RAY EXAM CHEST 1 VIEW: CPT

## 2018-12-25 PROCEDURE — 83735 ASSAY OF MAGNESIUM: CPT | Performed by: HOSPITALIST

## 2018-12-25 PROCEDURE — 96374 THER/PROPH/DIAG INJ IV PUSH: CPT

## 2018-12-25 PROCEDURE — 96375 TX/PRO/DX INJ NEW DRUG ADDON: CPT

## 2018-12-25 PROCEDURE — 82962 GLUCOSE BLOOD TEST: CPT

## 2018-12-25 PROCEDURE — 25010000002 HYDROMORPHONE PER 4 MG: Performed by: PHYSICIAN ASSISTANT

## 2018-12-25 PROCEDURE — 71045 X-RAY EXAM CHEST 1 VIEW: CPT | Performed by: RADIOLOGY

## 2018-12-25 PROCEDURE — 25010000002 HYDROMORPHONE PER 4 MG: Performed by: HOSPITALIST

## 2018-12-25 PROCEDURE — 85025 COMPLETE CBC W/AUTO DIFF WBC: CPT | Performed by: PHYSICIAN ASSISTANT

## 2018-12-25 PROCEDURE — 96376 TX/PRO/DX INJ SAME DRUG ADON: CPT

## 2018-12-25 PROCEDURE — 73610 X-RAY EXAM OF ANKLE: CPT | Performed by: RADIOLOGY

## 2018-12-25 PROCEDURE — 96361 HYDRATE IV INFUSION ADD-ON: CPT

## 2018-12-25 PROCEDURE — 99284 EMERGENCY DEPT VISIT MOD MDM: CPT

## 2018-12-25 PROCEDURE — 25010000002 HYDROMORPHONE 1 MG/ML SOLUTION: Performed by: EMERGENCY MEDICINE

## 2018-12-25 PROCEDURE — 80053 COMPREHEN METABOLIC PANEL: CPT | Performed by: PHYSICIAN ASSISTANT

## 2018-12-25 PROCEDURE — 63710000001 INSULIN ASPART PER 5 UNITS: Performed by: HOSPITALIST

## 2018-12-25 PROCEDURE — 73590 X-RAY EXAM OF LOWER LEG: CPT

## 2018-12-25 PROCEDURE — 93005 ELECTROCARDIOGRAM TRACING: CPT | Performed by: PHYSICIAN ASSISTANT

## 2018-12-25 PROCEDURE — 63710000001 INSULIN DETEMIR PER 5 UNITS: Performed by: HOSPITALIST

## 2018-12-25 PROCEDURE — 73590 X-RAY EXAM OF LOWER LEG: CPT | Performed by: RADIOLOGY

## 2018-12-25 RX ORDER — ACETAMINOPHEN 325 MG/1
650 TABLET ORAL EVERY 4 HOURS PRN
Status: DISCONTINUED | OUTPATIENT
Start: 2018-12-25 | End: 2018-12-30 | Stop reason: HOSPADM

## 2018-12-25 RX ORDER — PROMETHAZINE HYDROCHLORIDE 12.5 MG/1
12.5 TABLET ORAL EVERY 6 HOURS PRN
Status: DISCONTINUED | OUTPATIENT
Start: 2018-12-25 | End: 2018-12-30 | Stop reason: HOSPADM

## 2018-12-25 RX ORDER — AMLODIPINE BESYLATE 10 MG/1
10 TABLET ORAL
Status: CANCELLED | OUTPATIENT
Start: 2018-12-26

## 2018-12-25 RX ORDER — HYDROCODONE BITARTRATE AND ACETAMINOPHEN 7.5; 325 MG/1; MG/1
1 TABLET ORAL EVERY 8 HOURS PRN
COMMUNITY
End: 2018-12-30 | Stop reason: HOSPADM

## 2018-12-25 RX ORDER — DEXTROSE MONOHYDRATE 25 G/50ML
25 INJECTION, SOLUTION INTRAVENOUS
Status: DISCONTINUED | OUTPATIENT
Start: 2018-12-25 | End: 2018-12-26

## 2018-12-25 RX ORDER — HYDROCODONE BITARTRATE AND ACETAMINOPHEN 7.5; 325 MG/1; MG/1
1 TABLET ORAL EVERY 4 HOURS PRN
Qty: 15 TABLET | Refills: 0 | Status: ON HOLD | OUTPATIENT
Start: 2018-12-25 | End: 2019-03-16

## 2018-12-25 RX ORDER — HYDROXYZINE HYDROCHLORIDE 25 MG/1
25 TABLET, FILM COATED ORAL DAILY PRN
Status: ON HOLD | COMMUNITY
End: 2019-08-06

## 2018-12-25 RX ORDER — NICOTINE POLACRILEX 4 MG
15 LOZENGE BUCCAL
Status: DISCONTINUED | OUTPATIENT
Start: 2018-12-25 | End: 2018-12-26

## 2018-12-25 RX ORDER — HYDROMORPHONE HYDROCHLORIDE 1 MG/ML
0.5 INJECTION, SOLUTION INTRAMUSCULAR; INTRAVENOUS; SUBCUTANEOUS
Status: DISCONTINUED | OUTPATIENT
Start: 2018-12-25 | End: 2018-12-30 | Stop reason: HOSPADM

## 2018-12-25 RX ORDER — TIZANIDINE 4 MG/1
4 TABLET ORAL EVERY 6 HOURS PRN
Status: CANCELLED | OUTPATIENT
Start: 2018-12-25

## 2018-12-25 RX ORDER — HYDROCODONE BITARTRATE AND ACETAMINOPHEN 10; 325 MG/1; MG/1
1 TABLET ORAL EVERY 6 HOURS PRN
Status: CANCELLED | OUTPATIENT
Start: 2018-12-25

## 2018-12-25 RX ORDER — TIZANIDINE 4 MG/1
4 TABLET ORAL EVERY 6 HOURS PRN
COMMUNITY

## 2018-12-25 RX ORDER — GABAPENTIN 400 MG/1
800 CAPSULE ORAL 3 TIMES DAILY
Status: CANCELLED | OUTPATIENT
Start: 2018-12-26

## 2018-12-25 RX ORDER — SODIUM CHLORIDE 0.9 % (FLUSH) 0.9 %
10 SYRINGE (ML) INJECTION AS NEEDED
Status: DISCONTINUED | OUTPATIENT
Start: 2018-12-25 | End: 2018-12-30 | Stop reason: HOSPADM

## 2018-12-25 RX ORDER — ATENOLOL 25 MG/1
25 TABLET ORAL 2 TIMES DAILY
Status: CANCELLED | OUTPATIENT
Start: 2018-12-26

## 2018-12-25 RX ORDER — SODIUM CHLORIDE 0.9 % (FLUSH) 0.9 %
3 SYRINGE (ML) INJECTION EVERY 12 HOURS SCHEDULED
Status: DISCONTINUED | OUTPATIENT
Start: 2018-12-25 | End: 2018-12-30 | Stop reason: HOSPADM

## 2018-12-25 RX ORDER — PANTOPRAZOLE SODIUM 40 MG/10ML
40 INJECTION, POWDER, LYOPHILIZED, FOR SOLUTION INTRAVENOUS
Status: DISCONTINUED | OUTPATIENT
Start: 2018-12-25 | End: 2018-12-30

## 2018-12-25 RX ORDER — HYDROCODONE BITARTRATE AND ACETAMINOPHEN 5; 325 MG/1; MG/1
1 TABLET ORAL ONCE
Status: COMPLETED | OUTPATIENT
Start: 2018-12-25 | End: 2018-12-25

## 2018-12-25 RX ORDER — HYDROCODONE BITARTRATE AND ACETAMINOPHEN 7.5; 325 MG/1; MG/1
1 TABLET ORAL EVERY 8 HOURS PRN
Status: CANCELLED | OUTPATIENT
Start: 2018-12-25

## 2018-12-25 RX ORDER — SODIUM CHLORIDE 9 MG/ML
100 INJECTION, SOLUTION INTRAVENOUS CONTINUOUS
Status: DISCONTINUED | OUTPATIENT
Start: 2018-12-25 | End: 2018-12-27

## 2018-12-25 RX ORDER — SENNA AND DOCUSATE SODIUM 50; 8.6 MG/1; MG/1
2 TABLET, FILM COATED ORAL 2 TIMES DAILY
Status: DISCONTINUED | OUTPATIENT
Start: 2018-12-25 | End: 2018-12-27

## 2018-12-25 RX ORDER — SODIUM CHLORIDE 0.9 % (FLUSH) 0.9 %
3-10 SYRINGE (ML) INJECTION AS NEEDED
Status: DISCONTINUED | OUTPATIENT
Start: 2018-12-25 | End: 2018-12-30 | Stop reason: HOSPADM

## 2018-12-25 RX ADMIN — INSULIN ASPART 12 UNITS: 100 INJECTION, SOLUTION INTRAVENOUS; SUBCUTANEOUS at 22:36

## 2018-12-25 RX ADMIN — HYDROMORPHONE HYDROCHLORIDE 1 MG: 1 INJECTION, SOLUTION INTRAMUSCULAR; INTRAVENOUS; SUBCUTANEOUS at 17:25

## 2018-12-25 RX ADMIN — SENNOSIDES AND DOCUSATE SODIUM 2 TABLET: 8.6; 5 TABLET ORAL at 22:16

## 2018-12-25 RX ADMIN — SODIUM CHLORIDE 1000 ML: 9 INJECTION, SOLUTION INTRAVENOUS at 17:26

## 2018-12-25 RX ADMIN — HYDROMORPHONE HYDROCHLORIDE 0.5 MG: 1 INJECTION, SOLUTION INTRAMUSCULAR; INTRAVENOUS; SUBCUTANEOUS at 22:23

## 2018-12-25 RX ADMIN — PANTOPRAZOLE SODIUM 40 MG: 40 INJECTION, POWDER, FOR SOLUTION INTRAVENOUS at 22:16

## 2018-12-25 RX ADMIN — SODIUM CHLORIDE 100 ML/HR: 9 INJECTION, SOLUTION INTRAVENOUS at 22:16

## 2018-12-25 RX ADMIN — INSULIN DETEMIR 15 UNITS: 100 INJECTION, SOLUTION SUBCUTANEOUS at 22:36

## 2018-12-25 RX ADMIN — HYDROCODONE BITARTRATE AND ACETAMINOPHEN 1 TABLET: 5; 325 TABLET ORAL at 16:38

## 2018-12-26 ENCOUNTER — READMISSION MANAGEMENT (OUTPATIENT)
Dept: CALL CENTER | Facility: HOSPITAL | Age: 58
End: 2018-12-26

## 2018-12-26 LAB
ABO + RH BLD: NORMAL
ABO GROUP BLD: NORMAL
ABO GROUP BLD: NORMAL
AMORPH URATE CRY URNS QL MICRO: ABNORMAL /HPF
ANION GAP SERPL CALCULATED.3IONS-SCNC: 5.2 MMOL/L (ref 3.6–11.2)
APTT PPP: 39.7 SECONDS (ref 23.8–36.1)
BACTERIA UR QL AUTO: ABNORMAL /HPF
BASOPHILS # BLD AUTO: 0.02 10*3/MM3 (ref 0–0.3)
BASOPHILS NFR BLD AUTO: 0.5 % (ref 0–2)
BH BB BLOOD EXPIRATION DATE: NORMAL
BH BB BLOOD TYPE BARCODE: 5100
BH BB DISPENSE STATUS: NORMAL
BH BB PRODUCT CODE: NORMAL
BH BB UNIT NUMBER: NORMAL
BILIRUB UR QL STRIP: NEGATIVE
BLD GP AB SCN SERPL QL: NEGATIVE
BUN BLD-MCNC: 29 MG/DL (ref 7–21)
BUN/CREAT SERPL: 21.5 (ref 7–25)
CALCIUM SPEC-SCNC: 7.6 MG/DL (ref 7.7–10)
CHLORIDE SERPL-SCNC: 109 MMOL/L (ref 99–112)
CLARITY UR: CLEAR
CO2 SERPL-SCNC: 27.8 MMOL/L (ref 24.3–31.9)
COLOR UR: YELLOW
CREAT BLD-MCNC: 1.35 MG/DL (ref 0.43–1.29)
DEPRECATED RDW RBC AUTO: 46.7 FL (ref 37–54)
EOSINOPHIL # BLD AUTO: 0.29 10*3/MM3 (ref 0–0.7)
EOSINOPHIL NFR BLD AUTO: 7 % (ref 0–5)
ERYTHROCYTE [DISTWIDTH] IN BLOOD BY AUTOMATED COUNT: 14.3 % (ref 11.5–14.5)
GFR SERPL CREATININE-BSD FRML MDRD: 40 ML/MIN/1.73
GLUCOSE BLD-MCNC: 122 MG/DL (ref 70–110)
GLUCOSE BLDC GLUCOMTR-MCNC: 218 MG/DL (ref 70–130)
GLUCOSE BLDC GLUCOMTR-MCNC: 308 MG/DL (ref 70–130)
GLUCOSE BLDC GLUCOMTR-MCNC: 76 MG/DL (ref 70–130)
GLUCOSE BLDC GLUCOMTR-MCNC: 92 MG/DL (ref 70–130)
GLUCOSE UR STRIP-MCNC: ABNORMAL MG/DL
HCT VFR BLD AUTO: 22.2 % (ref 37–47)
HCT VFR BLD AUTO: 27.9 % (ref 37–47)
HGB BLD-MCNC: 6.8 G/DL (ref 12–16)
HGB BLD-MCNC: 8.8 G/DL (ref 12–16)
HGB UR QL STRIP.AUTO: NEGATIVE
HYALINE CASTS UR QL AUTO: ABNORMAL /LPF
IMM GRANULOCYTES # BLD AUTO: 0.01 10*3/MM3 (ref 0–0.03)
IMM GRANULOCYTES NFR BLD AUTO: 0.2 % (ref 0–0.5)
INR PPP: 1.12 (ref 0.9–1.1)
KETONES UR QL STRIP: NEGATIVE
LEUKOCYTE ESTERASE UR QL STRIP.AUTO: NEGATIVE
LYMPHOCYTES # BLD AUTO: 1.38 10*3/MM3 (ref 1–3)
LYMPHOCYTES NFR BLD AUTO: 33.1 % (ref 21–51)
MCH RBC QN AUTO: 28.6 PG (ref 27–33)
MCHC RBC AUTO-ENTMCNC: 30.6 G/DL (ref 33–37)
MCV RBC AUTO: 93.3 FL (ref 80–94)
MONOCYTES # BLD AUTO: 0.45 10*3/MM3 (ref 0.1–0.9)
MONOCYTES NFR BLD AUTO: 10.8 % (ref 0–10)
NEUTROPHILS # BLD AUTO: 2.02 10*3/MM3 (ref 1.4–6.5)
NEUTROPHILS NFR BLD AUTO: 48.4 % (ref 30–70)
NITRITE UR QL STRIP: NEGATIVE
OSMOLALITY SERPL CALC.SUM OF ELEC: 290.3 MOSM/KG (ref 273–305)
PH UR STRIP.AUTO: <=5 [PH] (ref 5–8)
PLATELET # BLD AUTO: 160 10*3/MM3 (ref 130–400)
PMV BLD AUTO: 9.6 FL (ref 6–10)
POTASSIUM BLD-SCNC: 3.9 MMOL/L (ref 3.5–5.3)
PROT UR QL STRIP: ABNORMAL
PROTHROMBIN TIME: 14.6 SECONDS (ref 11–15.4)
RBC # BLD AUTO: 2.38 10*6/MM3 (ref 4.2–5.4)
RBC # UR: ABNORMAL /HPF
REF LAB TEST METHOD: ABNORMAL
RH BLD: POSITIVE
RH BLD: POSITIVE
SODIUM BLD-SCNC: 142 MMOL/L (ref 135–153)
SP GR UR STRIP: 1.02 (ref 1–1.03)
SQUAMOUS #/AREA URNS HPF: ABNORMAL /HPF
T&S EXPIRATION DATE: NORMAL
TSH SERPL DL<=0.05 MIU/L-ACNC: 5.71 MIU/ML (ref 0.55–4.78)
UNIT  ABO: NORMAL
UNIT  RH: NORMAL
UROBILINOGEN UR QL STRIP: ABNORMAL
WBC NRBC COR # BLD: 4.17 10*3/MM3 (ref 4.5–12.5)
WBC UR QL AUTO: ABNORMAL /HPF

## 2018-12-26 PROCEDURE — 86850 RBC ANTIBODY SCREEN: CPT | Performed by: HOSPITALIST

## 2018-12-26 PROCEDURE — 86900 BLOOD TYPING SEROLOGIC ABO: CPT

## 2018-12-26 PROCEDURE — 94799 UNLISTED PULMONARY SVC/PX: CPT

## 2018-12-26 PROCEDURE — 96376 TX/PRO/DX INJ SAME DRUG ADON: CPT

## 2018-12-26 PROCEDURE — 25010000002 HYDROMORPHONE PER 4 MG: Performed by: HOSPITALIST

## 2018-12-26 PROCEDURE — P9016 RBC LEUKOCYTES REDUCED: HCPCS

## 2018-12-26 PROCEDURE — 85730 THROMBOPLASTIN TIME PARTIAL: CPT | Performed by: HOSPITALIST

## 2018-12-26 PROCEDURE — 85014 HEMATOCRIT: CPT | Performed by: HOSPITALIST

## 2018-12-26 PROCEDURE — 80048 BASIC METABOLIC PNL TOTAL CA: CPT | Performed by: HOSPITALIST

## 2018-12-26 PROCEDURE — 63710000001 INSULIN DETEMIR PER 5 UNITS: Performed by: HOSPITALIST

## 2018-12-26 PROCEDURE — 84443 ASSAY THYROID STIM HORMONE: CPT | Performed by: HOSPITALIST

## 2018-12-26 PROCEDURE — 96361 HYDRATE IV INFUSION ADD-ON: CPT

## 2018-12-26 PROCEDURE — 86901 BLOOD TYPING SEROLOGIC RH(D): CPT | Performed by: HOSPITALIST

## 2018-12-26 PROCEDURE — 63710000001 INSULIN ASPART PER 5 UNITS: Performed by: HOSPITALIST

## 2018-12-26 PROCEDURE — 86901 BLOOD TYPING SEROLOGIC RH(D): CPT

## 2018-12-26 PROCEDURE — G0378 HOSPITAL OBSERVATION PER HR: HCPCS

## 2018-12-26 PROCEDURE — 85025 COMPLETE CBC W/AUTO DIFF WBC: CPT | Performed by: HOSPITALIST

## 2018-12-26 PROCEDURE — 81001 URINALYSIS AUTO W/SCOPE: CPT | Performed by: HOSPITALIST

## 2018-12-26 PROCEDURE — 86900 BLOOD TYPING SEROLOGIC ABO: CPT | Performed by: HOSPITALIST

## 2018-12-26 PROCEDURE — 82962 GLUCOSE BLOOD TEST: CPT

## 2018-12-26 PROCEDURE — 25010000002 HYDROMORPHONE PER 4 MG: Performed by: PHYSICIAN ASSISTANT

## 2018-12-26 PROCEDURE — 51702 INSERT TEMP BLADDER CATH: CPT

## 2018-12-26 PROCEDURE — 36430 TRANSFUSION BLD/BLD COMPNT: CPT

## 2018-12-26 PROCEDURE — 85610 PROTHROMBIN TIME: CPT | Performed by: HOSPITALIST

## 2018-12-26 PROCEDURE — 99232 SBSQ HOSP IP/OBS MODERATE 35: CPT | Performed by: HOSPITALIST

## 2018-12-26 PROCEDURE — 86923 COMPATIBILITY TEST ELECTRIC: CPT

## 2018-12-26 PROCEDURE — 85018 HEMOGLOBIN: CPT | Performed by: HOSPITALIST

## 2018-12-26 RX ORDER — SENNA AND DOCUSATE SODIUM 50; 8.6 MG/1; MG/1
1 TABLET, FILM COATED ORAL 2 TIMES DAILY
Status: DISCONTINUED | OUTPATIENT
Start: 2018-12-26 | End: 2018-12-26

## 2018-12-26 RX ORDER — TEMAZEPAM 15 MG/1
30 CAPSULE ORAL NIGHTLY
Status: DISCONTINUED | OUTPATIENT
Start: 2018-12-26 | End: 2018-12-30 | Stop reason: HOSPADM

## 2018-12-26 RX ORDER — SODIUM CHLORIDE 9 MG/ML
INJECTION, SOLUTION INTRAVENOUS
Status: COMPLETED
Start: 2018-12-26 | End: 2018-12-26

## 2018-12-26 RX ORDER — PANTOPRAZOLE SODIUM 40 MG/1
40 TABLET, DELAYED RELEASE ORAL DAILY
Status: DISCONTINUED | OUTPATIENT
Start: 2018-12-26 | End: 2018-12-26

## 2018-12-26 RX ORDER — HYDROCODONE BITARTRATE AND ACETAMINOPHEN 10; 325 MG/1; MG/1
1 TABLET ORAL EVERY 6 HOURS PRN
Status: DISCONTINUED | OUTPATIENT
Start: 2018-12-26 | End: 2018-12-30 | Stop reason: HOSPADM

## 2018-12-26 RX ORDER — ESCITALOPRAM OXALATE 10 MG/1
10 TABLET ORAL NIGHTLY
Status: DISCONTINUED | OUTPATIENT
Start: 2018-12-26 | End: 2018-12-30 | Stop reason: HOSPADM

## 2018-12-26 RX ORDER — NICOTINE POLACRILEX 4 MG
15 LOZENGE BUCCAL
Status: DISCONTINUED | OUTPATIENT
Start: 2018-12-26 | End: 2018-12-30 | Stop reason: HOSPADM

## 2018-12-26 RX ORDER — CLINDAMYCIN PHOSPHATE 900 MG/50ML
900 INJECTION INTRAVENOUS
Status: DISCONTINUED | OUTPATIENT
Start: 2018-12-27 | End: 2018-12-27

## 2018-12-26 RX ORDER — GABAPENTIN 400 MG/1
400 CAPSULE ORAL EVERY 8 HOURS SCHEDULED
Status: DISCONTINUED | OUTPATIENT
Start: 2018-12-26 | End: 2018-12-30 | Stop reason: HOSPADM

## 2018-12-26 RX ORDER — DEXTROSE MONOHYDRATE 25 G/50ML
25 INJECTION, SOLUTION INTRAVENOUS
Status: DISCONTINUED | OUTPATIENT
Start: 2018-12-26 | End: 2018-12-30 | Stop reason: HOSPADM

## 2018-12-26 RX ORDER — FLUDROCORTISONE ACETATE 0.1 MG/1
0.1 TABLET ORAL DAILY
Status: DISCONTINUED | OUTPATIENT
Start: 2018-12-26 | End: 2018-12-30 | Stop reason: HOSPADM

## 2018-12-26 RX ORDER — HYDROXYZINE HYDROCHLORIDE 25 MG/1
25 TABLET, FILM COATED ORAL 3 TIMES DAILY PRN
Status: DISCONTINUED | OUTPATIENT
Start: 2018-12-26 | End: 2018-12-30 | Stop reason: HOSPADM

## 2018-12-26 RX ORDER — TIZANIDINE 4 MG/1
4 TABLET ORAL EVERY 8 HOURS PRN
Status: DISCONTINUED | OUTPATIENT
Start: 2018-12-26 | End: 2018-12-30 | Stop reason: HOSPADM

## 2018-12-26 RX ADMIN — INSULIN ASPART 5 UNITS: 100 INJECTION, SOLUTION INTRAVENOUS; SUBCUTANEOUS at 21:21

## 2018-12-26 RX ADMIN — HYDROMORPHONE HYDROCHLORIDE 0.5 MG: 1 INJECTION, SOLUTION INTRAMUSCULAR; INTRAVENOUS; SUBCUTANEOUS at 04:22

## 2018-12-26 RX ADMIN — HYDROMORPHONE HYDROCHLORIDE 0.5 MG: 1 INJECTION, SOLUTION INTRAMUSCULAR; INTRAVENOUS; SUBCUTANEOUS at 16:01

## 2018-12-26 RX ADMIN — FLUDROCORTISONE ACETATE 0.1 MG: 0.1 TABLET ORAL at 21:12

## 2018-12-26 RX ADMIN — GABAPENTIN 400 MG: 400 CAPSULE ORAL at 21:12

## 2018-12-26 RX ADMIN — HYDROMORPHONE HYDROCHLORIDE 0.5 MG: 1 INJECTION, SOLUTION INTRAMUSCULAR; INTRAVENOUS; SUBCUTANEOUS at 19:05

## 2018-12-26 RX ADMIN — SODIUM CHLORIDE, PRESERVATIVE FREE 10 ML: 5 INJECTION INTRAVENOUS at 08:49

## 2018-12-26 RX ADMIN — TEMAZEPAM 30 MG: 15 CAPSULE ORAL at 21:20

## 2018-12-26 RX ADMIN — INSULIN ASPART 3 UNITS: 100 INJECTION, SOLUTION INTRAVENOUS; SUBCUTANEOUS at 18:45

## 2018-12-26 RX ADMIN — HYDROMORPHONE HYDROCHLORIDE 0.5 MG: 1 INJECTION, SOLUTION INTRAMUSCULAR; INTRAVENOUS; SUBCUTANEOUS at 11:28

## 2018-12-26 RX ADMIN — PANTOPRAZOLE SODIUM 40 MG: 40 INJECTION, POWDER, FOR SOLUTION INTRAVENOUS at 08:49

## 2018-12-26 RX ADMIN — SODIUM CHLORIDE 250 ML: 9 INJECTION, SOLUTION INTRAVENOUS at 04:29

## 2018-12-26 RX ADMIN — INSULIN DETEMIR 10 UNITS: 100 INJECTION, SOLUTION SUBCUTANEOUS at 21:14

## 2018-12-26 RX ADMIN — ESCITALOPRAM 10 MG: 10 TABLET, FILM COATED ORAL at 21:12

## 2018-12-26 RX ADMIN — HYDROMORPHONE HYDROCHLORIDE 0.5 MG: 1 INJECTION, SOLUTION INTRAMUSCULAR; INTRAVENOUS; SUBCUTANEOUS at 22:28

## 2018-12-26 RX ADMIN — OFLOXACIN 50000 UNITS: 300 TABLET, COATED ORAL at 21:12

## 2018-12-26 RX ADMIN — PANTOPRAZOLE SODIUM 40 MG: 40 INJECTION, POWDER, FOR SOLUTION INTRAVENOUS at 18:45

## 2018-12-26 RX ADMIN — SENNOSIDES AND DOCUSATE SODIUM 2 TABLET: 8.6; 5 TABLET ORAL at 21:12

## 2018-12-26 RX ADMIN — GABAPENTIN 400 MG: 400 CAPSULE ORAL at 16:00

## 2018-12-26 RX ADMIN — SODIUM CHLORIDE 100 ML/HR: 9 INJECTION, SOLUTION INTRAVENOUS at 15:07

## 2018-12-26 RX ADMIN — SODIUM CHLORIDE, PRESERVATIVE FREE 3 ML: 5 INJECTION INTRAVENOUS at 08:51

## 2018-12-26 NOTE — OUTREACH NOTE
Sepsis Week 4 Survey      Responses   Facility patient discharged from?  [facility]   Does the patient have one of the following disease processes/diagnoses(primary or secondary)?  Sepsis   Week 4 attempt successful?  No   Revoke  Readmitted          Lily Pacheco RN

## 2018-12-27 LAB
ANION GAP SERPL CALCULATED.3IONS-SCNC: 5.3 MMOL/L (ref 3.6–11.2)
BASOPHILS # BLD AUTO: 0.02 10*3/MM3 (ref 0–0.3)
BASOPHILS NFR BLD AUTO: 0.5 % (ref 0–2)
BUN BLD-MCNC: 14 MG/DL (ref 7–21)
BUN/CREAT SERPL: 12.8 (ref 7–25)
CALCIUM SPEC-SCNC: 8.3 MG/DL (ref 7.7–10)
CHLORIDE SERPL-SCNC: 108 MMOL/L (ref 99–112)
CO2 SERPL-SCNC: 29.7 MMOL/L (ref 24.3–31.9)
CREAT BLD-MCNC: 1.09 MG/DL (ref 0.43–1.29)
DEPRECATED RDW RBC AUTO: 46.6 FL (ref 37–54)
EOSINOPHIL # BLD AUTO: 0.3 10*3/MM3 (ref 0–0.7)
EOSINOPHIL NFR BLD AUTO: 7.5 % (ref 0–5)
ERYTHROCYTE [DISTWIDTH] IN BLOOD BY AUTOMATED COUNT: 14.3 % (ref 11.5–14.5)
GFR SERPL CREATININE-BSD FRML MDRD: 51 ML/MIN/1.73
GLUCOSE BLD-MCNC: 130 MG/DL (ref 70–110)
GLUCOSE BLDC GLUCOMTR-MCNC: 159 MG/DL (ref 70–130)
GLUCOSE BLDC GLUCOMTR-MCNC: 240 MG/DL (ref 70–130)
GLUCOSE BLDC GLUCOMTR-MCNC: 265 MG/DL (ref 70–130)
GLUCOSE BLDC GLUCOMTR-MCNC: 357 MG/DL (ref 70–130)
HCT VFR BLD AUTO: 30.4 % (ref 37–47)
HEMOCCULT STL QL: NEGATIVE
HGB BLD-MCNC: 9.5 G/DL (ref 12–16)
IMM GRANULOCYTES # BLD AUTO: 0.01 10*3/MM3 (ref 0–0.03)
IMM GRANULOCYTES NFR BLD AUTO: 0.3 % (ref 0–0.5)
LYMPHOCYTES # BLD AUTO: 1.03 10*3/MM3 (ref 1–3)
LYMPHOCYTES NFR BLD AUTO: 25.9 % (ref 21–51)
MCH RBC QN AUTO: 28.9 PG (ref 27–33)
MCHC RBC AUTO-ENTMCNC: 31.3 G/DL (ref 33–37)
MCV RBC AUTO: 92.4 FL (ref 80–94)
MONOCYTES # BLD AUTO: 0.39 10*3/MM3 (ref 0.1–0.9)
MONOCYTES NFR BLD AUTO: 9.8 % (ref 0–10)
NEUTROPHILS # BLD AUTO: 2.23 10*3/MM3 (ref 1.4–6.5)
NEUTROPHILS NFR BLD AUTO: 56 % (ref 30–70)
OSMOLALITY SERPL CALC.SUM OF ELEC: 287.2 MOSM/KG (ref 273–305)
PLATELET # BLD AUTO: 193 10*3/MM3 (ref 130–400)
PMV BLD AUTO: 9.1 FL (ref 6–10)
POTASSIUM BLD-SCNC: 4.6 MMOL/L (ref 3.5–5.3)
RBC # BLD AUTO: 3.29 10*6/MM3 (ref 4.2–5.4)
SODIUM BLD-SCNC: 143 MMOL/L (ref 135–153)
WBC NRBC COR # BLD: 3.98 10*3/MM3 (ref 4.5–12.5)

## 2018-12-27 PROCEDURE — 82272 OCCULT BLD FECES 1-3 TESTS: CPT | Performed by: INTERNAL MEDICINE

## 2018-12-27 PROCEDURE — 96376 TX/PRO/DX INJ SAME DRUG ADON: CPT

## 2018-12-27 PROCEDURE — 82962 GLUCOSE BLOOD TEST: CPT

## 2018-12-27 PROCEDURE — 96361 HYDRATE IV INFUSION ADD-ON: CPT

## 2018-12-27 PROCEDURE — 63710000001 INSULIN DETEMIR PER 5 UNITS: Performed by: HOSPITALIST

## 2018-12-27 PROCEDURE — 99233 SBSQ HOSP IP/OBS HIGH 50: CPT | Performed by: PHYSICIAN ASSISTANT

## 2018-12-27 PROCEDURE — G0378 HOSPITAL OBSERVATION PER HR: HCPCS

## 2018-12-27 PROCEDURE — 85025 COMPLETE CBC W/AUTO DIFF WBC: CPT | Performed by: HOSPITALIST

## 2018-12-27 PROCEDURE — 63710000001 INSULIN ASPART PER 5 UNITS: Performed by: HOSPITALIST

## 2018-12-27 PROCEDURE — 94799 UNLISTED PULMONARY SVC/PX: CPT

## 2018-12-27 PROCEDURE — 80048 BASIC METABOLIC PNL TOTAL CA: CPT | Performed by: HOSPITALIST

## 2018-12-27 PROCEDURE — 25010000002 HYDROMORPHONE PER 4 MG: Performed by: PHYSICIAN ASSISTANT

## 2018-12-27 PROCEDURE — 25010000002 HYDROMORPHONE PER 4 MG: Performed by: HOSPITALIST

## 2018-12-27 PROCEDURE — 99232 SBSQ HOSP IP/OBS MODERATE 35: CPT | Performed by: INTERNAL MEDICINE

## 2018-12-27 RX ORDER — ATENOLOL 25 MG/1
25 TABLET ORAL 2 TIMES DAILY
Status: DISCONTINUED | OUTPATIENT
Start: 2018-12-27 | End: 2018-12-30 | Stop reason: HOSPADM

## 2018-12-27 RX ORDER — CLINDAMYCIN PHOSPHATE 900 MG/50ML
900 INJECTION INTRAVENOUS
Status: DISCONTINUED | OUTPATIENT
Start: 2018-12-28 | End: 2018-12-28 | Stop reason: HOSPADM

## 2018-12-27 RX ORDER — DOCUSATE SODIUM 100 MG/1
100 CAPSULE, LIQUID FILLED ORAL 2 TIMES DAILY
Status: DISCONTINUED | OUTPATIENT
Start: 2018-12-27 | End: 2018-12-30 | Stop reason: HOSPADM

## 2018-12-27 RX ADMIN — HYDROMORPHONE HYDROCHLORIDE 0.5 MG: 1 INJECTION, SOLUTION INTRAMUSCULAR; INTRAVENOUS; SUBCUTANEOUS at 22:01

## 2018-12-27 RX ADMIN — INSULIN ASPART 3 UNITS: 100 INJECTION, SOLUTION INTRAVENOUS; SUBCUTANEOUS at 11:12

## 2018-12-27 RX ADMIN — GABAPENTIN 400 MG: 400 CAPSULE ORAL at 14:43

## 2018-12-27 RX ADMIN — HYDROMORPHONE HYDROCHLORIDE 0.5 MG: 1 INJECTION, SOLUTION INTRAMUSCULAR; INTRAVENOUS; SUBCUTANEOUS at 14:43

## 2018-12-27 RX ADMIN — INSULIN ASPART 7 UNITS: 100 INJECTION, SOLUTION INTRAVENOUS; SUBCUTANEOUS at 20:19

## 2018-12-27 RX ADMIN — INSULIN DETEMIR 10 UNITS: 100 INJECTION, SOLUTION SUBCUTANEOUS at 20:20

## 2018-12-27 RX ADMIN — TEMAZEPAM 30 MG: 15 CAPSULE ORAL at 20:18

## 2018-12-27 RX ADMIN — HYDROMORPHONE HYDROCHLORIDE 0.5 MG: 1 INJECTION, SOLUTION INTRAMUSCULAR; INTRAVENOUS; SUBCUTANEOUS at 11:12

## 2018-12-27 RX ADMIN — HYDROMORPHONE HYDROCHLORIDE 0.5 MG: 1 INJECTION, SOLUTION INTRAMUSCULAR; INTRAVENOUS; SUBCUTANEOUS at 02:40

## 2018-12-27 RX ADMIN — PANTOPRAZOLE SODIUM 40 MG: 40 INJECTION, POWDER, FOR SOLUTION INTRAVENOUS at 17:26

## 2018-12-27 RX ADMIN — PANTOPRAZOLE SODIUM 40 MG: 40 INJECTION, POWDER, FOR SOLUTION INTRAVENOUS at 08:04

## 2018-12-27 RX ADMIN — SODIUM CHLORIDE 100 ML/HR: 9 INJECTION, SOLUTION INTRAVENOUS at 01:00

## 2018-12-27 RX ADMIN — SODIUM CHLORIDE, PRESERVATIVE FREE 3 ML: 5 INJECTION INTRAVENOUS at 20:13

## 2018-12-27 RX ADMIN — SODIUM CHLORIDE 100 ML/HR: 9 INJECTION, SOLUTION INTRAVENOUS at 11:04

## 2018-12-27 RX ADMIN — ATENOLOL 25 MG: 25 TABLET ORAL at 20:13

## 2018-12-27 RX ADMIN — ESCITALOPRAM 10 MG: 10 TABLET, FILM COATED ORAL at 20:13

## 2018-12-27 RX ADMIN — INSULIN ASPART 4 UNITS: 100 INJECTION, SOLUTION INTRAVENOUS; SUBCUTANEOUS at 16:55

## 2018-12-28 ENCOUNTER — ANESTHESIA EVENT (OUTPATIENT)
Dept: PERIOP | Facility: HOSPITAL | Age: 58
End: 2018-12-28

## 2018-12-28 ENCOUNTER — APPOINTMENT (OUTPATIENT)
Dept: GENERAL RADIOLOGY | Facility: HOSPITAL | Age: 58
End: 2018-12-28

## 2018-12-28 ENCOUNTER — ANESTHESIA (OUTPATIENT)
Dept: PERIOP | Facility: HOSPITAL | Age: 58
End: 2018-12-28

## 2018-12-28 LAB
25(OH)D3 SERPL-MCNC: 34 NG/ML
ALBUMIN SERPL-MCNC: 3.4 G/DL (ref 3.4–4.8)
ALBUMIN/GLOB SERPL: 1.3 G/DL (ref 1.5–2.5)
ALP SERPL-CCNC: 164 U/L (ref 35–104)
ALT SERPL W P-5'-P-CCNC: 14 U/L (ref 10–36)
ANION GAP SERPL CALCULATED.3IONS-SCNC: 6.3 MMOL/L (ref 3.6–11.2)
AST SERPL-CCNC: 15 U/L (ref 10–30)
BASOPHILS # BLD AUTO: 0.01 10*3/MM3 (ref 0–0.3)
BASOPHILS NFR BLD AUTO: 0.2 % (ref 0–2)
BILIRUB SERPL-MCNC: 0.8 MG/DL (ref 0.2–1.8)
BUN BLD-MCNC: 16 MG/DL (ref 7–21)
BUN/CREAT SERPL: 15.8 (ref 7–25)
CALCIUM SPEC-SCNC: 8.5 MG/DL (ref 7.7–10)
CHLORIDE SERPL-SCNC: 106 MMOL/L (ref 99–112)
CO2 SERPL-SCNC: 29.7 MMOL/L (ref 24.3–31.9)
CREAT BLD-MCNC: 1.01 MG/DL (ref 0.43–1.29)
DEPRECATED RDW RBC AUTO: 44.7 FL (ref 37–54)
EOSINOPHIL # BLD AUTO: 0.23 10*3/MM3 (ref 0–0.7)
EOSINOPHIL NFR BLD AUTO: 5.3 % (ref 0–5)
ERYTHROCYTE [DISTWIDTH] IN BLOOD BY AUTOMATED COUNT: 14 % (ref 11.5–14.5)
FERRITIN SERPL-MCNC: 308 NG/ML (ref 10–290.3)
FOLATE SERPL-MCNC: 10.25 NG/ML (ref 5.4–20)
GFR SERPL CREATININE-BSD FRML MDRD: 56 ML/MIN/1.73
GLOBULIN UR ELPH-MCNC: 2.6 GM/DL
GLUCOSE BLD-MCNC: 232 MG/DL (ref 70–110)
GLUCOSE BLDC GLUCOMTR-MCNC: 187 MG/DL (ref 70–130)
GLUCOSE BLDC GLUCOMTR-MCNC: 223 MG/DL (ref 70–130)
GLUCOSE BLDC GLUCOMTR-MCNC: 235 MG/DL (ref 70–130)
GLUCOSE BLDC GLUCOMTR-MCNC: 252 MG/DL (ref 70–130)
HCT VFR BLD AUTO: 30.4 % (ref 37–47)
HGB BLD-MCNC: 9.7 G/DL (ref 12–16)
IMM GRANULOCYTES # BLD AUTO: 0.02 10*3/MM3 (ref 0–0.03)
IMM GRANULOCYTES NFR BLD AUTO: 0.5 % (ref 0–0.5)
IRON 24H UR-MRATE: 40 MCG/DL (ref 49–151)
IRON SATN MFR SERPL: 17 % (ref 15–50)
LYMPHOCYTES # BLD AUTO: 1.09 10*3/MM3 (ref 1–3)
LYMPHOCYTES NFR BLD AUTO: 25.1 % (ref 21–51)
MAGNESIUM SERPL-MCNC: 1.8 MG/DL (ref 1.7–2.6)
MCH RBC QN AUTO: 28.8 PG (ref 27–33)
MCHC RBC AUTO-ENTMCNC: 31.9 G/DL (ref 33–37)
MCV RBC AUTO: 90.2 FL (ref 80–94)
MONOCYTES # BLD AUTO: 0.42 10*3/MM3 (ref 0.1–0.9)
MONOCYTES NFR BLD AUTO: 9.7 % (ref 0–10)
NEUTROPHILS # BLD AUTO: 2.57 10*3/MM3 (ref 1.4–6.5)
NEUTROPHILS NFR BLD AUTO: 59.2 % (ref 30–70)
OSMOLALITY SERPL CALC.SUM OF ELEC: 291.7 MOSM/KG (ref 273–305)
PHOSPHATE SERPL-MCNC: 3 MG/DL (ref 2.7–4.5)
PLATELET # BLD AUTO: 186 10*3/MM3 (ref 130–400)
PMV BLD AUTO: 9 FL (ref 6–10)
POTASSIUM BLD-SCNC: 3.8 MMOL/L (ref 3.5–5.3)
PROT SERPL-MCNC: 6 G/DL (ref 6–8)
RBC # BLD AUTO: 3.37 10*6/MM3 (ref 4.2–5.4)
SODIUM BLD-SCNC: 142 MMOL/L (ref 135–153)
TIBC SERPL-MCNC: 238 MCG/DL (ref 241–421)
VIT B12 BLD-MCNC: 354 PG/ML (ref 211–911)
WBC NRBC COR # BLD: 4.34 10*3/MM3 (ref 4.5–12.5)

## 2018-12-28 PROCEDURE — 82746 ASSAY OF FOLIC ACID SERUM: CPT | Performed by: INTERNAL MEDICINE

## 2018-12-28 PROCEDURE — 96376 TX/PRO/DX INJ SAME DRUG ADON: CPT

## 2018-12-28 PROCEDURE — 25010000002 ONDANSETRON PER 1 MG: Performed by: NURSE ANESTHETIST, CERTIFIED REGISTERED

## 2018-12-28 PROCEDURE — G0378 HOSPITAL OBSERVATION PER HR: HCPCS

## 2018-12-28 PROCEDURE — C1713 ANCHOR/SCREW BN/BN,TIS/BN: HCPCS | Performed by: ORTHOPAEDIC SURGERY

## 2018-12-28 PROCEDURE — 76000 FLUOROSCOPY <1 HR PHYS/QHP: CPT

## 2018-12-28 PROCEDURE — 83735 ASSAY OF MAGNESIUM: CPT | Performed by: PHYSICIAN ASSISTANT

## 2018-12-28 PROCEDURE — 84100 ASSAY OF PHOSPHORUS: CPT | Performed by: PHYSICIAN ASSISTANT

## 2018-12-28 PROCEDURE — 99232 SBSQ HOSP IP/OBS MODERATE 35: CPT | Performed by: INTERNAL MEDICINE

## 2018-12-28 PROCEDURE — G0108 DIAB MANAGE TRN  PER INDIV: HCPCS

## 2018-12-28 PROCEDURE — 96375 TX/PRO/DX INJ NEW DRUG ADDON: CPT

## 2018-12-28 PROCEDURE — 25010000002 HYDRALAZINE PER 20 MG: Performed by: NURSE PRACTITIONER

## 2018-12-28 PROCEDURE — 83540 ASSAY OF IRON: CPT | Performed by: INTERNAL MEDICINE

## 2018-12-28 PROCEDURE — 82962 GLUCOSE BLOOD TEST: CPT

## 2018-12-28 PROCEDURE — 82306 VITAMIN D 25 HYDROXY: CPT | Performed by: INTERNAL MEDICINE

## 2018-12-28 PROCEDURE — 85025 COMPLETE CBC W/AUTO DIFF WBC: CPT | Performed by: INTERNAL MEDICINE

## 2018-12-28 PROCEDURE — 25010000002 FENTANYL CITRATE (PF) 100 MCG/2ML SOLUTION: Performed by: ANESTHESIOLOGY

## 2018-12-28 PROCEDURE — 83550 IRON BINDING TEST: CPT | Performed by: INTERNAL MEDICINE

## 2018-12-28 PROCEDURE — 25010000002 DEXAMETHASONE PER 1 MG: Performed by: NURSE ANESTHETIST, CERTIFIED REGISTERED

## 2018-12-28 PROCEDURE — 82607 VITAMIN B-12: CPT | Performed by: INTERNAL MEDICINE

## 2018-12-28 PROCEDURE — 63710000001 INSULIN ASPART PER 5 UNITS: Performed by: HOSPITALIST

## 2018-12-28 PROCEDURE — 73590 X-RAY EXAM OF LOWER LEG: CPT | Performed by: RADIOLOGY

## 2018-12-28 PROCEDURE — 82728 ASSAY OF FERRITIN: CPT | Performed by: INTERNAL MEDICINE

## 2018-12-28 PROCEDURE — 80053 COMPREHEN METABOLIC PANEL: CPT | Performed by: PHYSICIAN ASSISTANT

## 2018-12-28 PROCEDURE — 25010000002 MIDAZOLAM PER 1 MG: Performed by: ANESTHESIOLOGY

## 2018-12-28 PROCEDURE — 63710000001 INSULIN ASPART PER 5 UNITS: Performed by: INTERNAL MEDICINE

## 2018-12-28 PROCEDURE — 25010000002 PROPOFOL 10 MG/ML EMULSION: Performed by: ANESTHESIOLOGY

## 2018-12-28 PROCEDURE — 25010000002 HYDROMORPHONE PER 4 MG: Performed by: PHYSICIAN ASSISTANT

## 2018-12-28 PROCEDURE — 94799 UNLISTED PULMONARY SVC/PX: CPT

## 2018-12-28 DEVICE — IMPLANTABLE DEVICE: Type: IMPLANTABLE DEVICE | Site: TIBIA | Status: FUNCTIONAL

## 2018-12-28 DEVICE — SCRW LK S/TAP T8STRDRV 2.7X36MM: Type: IMPLANTABLE DEVICE | Site: TIBIA | Status: FUNCTIONAL

## 2018-12-28 DEVICE — WAX BONE HEMO NAT 2.5G: Type: IMPLANTABLE DEVICE | Site: TIBIA | Status: FUNCTIONAL

## 2018-12-28 DEVICE — SCRW LK VA/LCP S/TAP STRDRV 3.5X34MM: Type: IMPLANTABLE DEVICE | Site: TIBIA | Status: FUNCTIONAL

## 2018-12-28 DEVICE — SCRW LK S/TAP T8STRDRV 2.7X34MM: Type: IMPLANTABLE DEVICE | Site: TIBIA | Status: FUNCTIONAL

## 2018-12-28 DEVICE — SCRW LK S/TAP T8STRDRV 2.7X18MM: Type: IMPLANTABLE DEVICE | Site: TIBIA | Status: FUNCTIONAL

## 2018-12-28 DEVICE — SCRW LK VA/LCP S/TAP STRDRV 3.5X32MM: Type: IMPLANTABLE DEVICE | Site: TIBIA | Status: FUNCTIONAL

## 2018-12-28 DEVICE — SCRW LK S/TAP T8STRDRV 2.7X38MM: Type: IMPLANTABLE DEVICE | Site: TIBIA | Status: FUNCTIONAL

## 2018-12-28 DEVICE — SCRW LK VA/LCP S/TAP STRDRV 3.5X30MM: Type: IMPLANTABLE DEVICE | Site: TIBIA | Status: FUNCTIONAL

## 2018-12-28 DEVICE — SCRW LK S/TAP T8STRDRV 2.7X20MM: Type: IMPLANTABLE DEVICE | Site: TIBIA | Status: FUNCTIONAL

## 2018-12-28 DEVICE — SCRW LK S/TAP STRDRV 3.5X10MM: Type: IMPLANTABLE DEVICE | Site: TIBIA | Status: FUNCTIONAL

## 2018-12-28 RX ORDER — SODIUM CHLORIDE 9 MG/ML
100 INJECTION, SOLUTION INTRAVENOUS CONTINUOUS
Status: DISCONTINUED | OUTPATIENT
Start: 2018-12-28 | End: 2018-12-29

## 2018-12-28 RX ORDER — SODIUM CHLORIDE 0.9 % (FLUSH) 0.9 %
3-10 SYRINGE (ML) INJECTION AS NEEDED
Status: DISCONTINUED | OUTPATIENT
Start: 2018-12-28 | End: 2018-12-28 | Stop reason: HOSPADM

## 2018-12-28 RX ORDER — HYDRALAZINE HYDROCHLORIDE 20 MG/ML
10 INJECTION INTRAMUSCULAR; INTRAVENOUS EVERY 4 HOURS PRN
Status: DISCONTINUED | OUTPATIENT
Start: 2018-12-28 | End: 2018-12-30 | Stop reason: HOSPADM

## 2018-12-28 RX ORDER — FAMOTIDINE 10 MG/ML
INJECTION, SOLUTION INTRAVENOUS AS NEEDED
Status: DISCONTINUED | OUTPATIENT
Start: 2018-12-28 | End: 2018-12-28 | Stop reason: SURG

## 2018-12-28 RX ORDER — CLINDAMYCIN PHOSPHATE 900 MG/50ML
900 INJECTION INTRAVENOUS EVERY 8 HOURS
Status: DISCONTINUED | OUTPATIENT
Start: 2018-12-29 | End: 2018-12-30 | Stop reason: HOSPADM

## 2018-12-28 RX ORDER — PROPOFOL 10 MG/ML
VIAL (ML) INTRAVENOUS AS NEEDED
Status: DISCONTINUED | OUTPATIENT
Start: 2018-12-28 | End: 2018-12-28 | Stop reason: SURG

## 2018-12-28 RX ORDER — MIDAZOLAM HYDROCHLORIDE 1 MG/ML
INJECTION INTRAMUSCULAR; INTRAVENOUS AS NEEDED
Status: DISCONTINUED | OUTPATIENT
Start: 2018-12-28 | End: 2018-12-28 | Stop reason: SURG

## 2018-12-28 RX ORDER — SODIUM CHLORIDE 0.9 % (FLUSH) 0.9 %
3 SYRINGE (ML) INJECTION EVERY 12 HOURS SCHEDULED
Status: DISCONTINUED | OUTPATIENT
Start: 2018-12-28 | End: 2018-12-28 | Stop reason: HOSPADM

## 2018-12-28 RX ORDER — HYDRALAZINE HYDROCHLORIDE 20 MG/ML
10 INJECTION INTRAMUSCULAR; INTRAVENOUS ONCE
Status: COMPLETED | OUTPATIENT
Start: 2018-12-28 | End: 2018-12-28

## 2018-12-28 RX ORDER — DEXAMETHASONE SODIUM PHOSPHATE 4 MG/ML
INJECTION, SOLUTION INTRA-ARTICULAR; INTRALESIONAL; INTRAMUSCULAR; INTRAVENOUS; SOFT TISSUE AS NEEDED
Status: DISCONTINUED | OUTPATIENT
Start: 2018-12-28 | End: 2018-12-28 | Stop reason: SURG

## 2018-12-28 RX ORDER — FENTANYL CITRATE 50 UG/ML
50 INJECTION, SOLUTION INTRAMUSCULAR; INTRAVENOUS
Status: DISCONTINUED | OUTPATIENT
Start: 2018-12-28 | End: 2018-12-28 | Stop reason: HOSPADM

## 2018-12-28 RX ORDER — SODIUM CHLORIDE 9 MG/ML
INJECTION, SOLUTION INTRAVENOUS
Status: DISPENSED
Start: 2018-12-28 | End: 2018-12-29

## 2018-12-28 RX ORDER — IPRATROPIUM BROMIDE AND ALBUTEROL SULFATE 2.5; .5 MG/3ML; MG/3ML
3 SOLUTION RESPIRATORY (INHALATION) ONCE AS NEEDED
Status: DISCONTINUED | OUTPATIENT
Start: 2018-12-28 | End: 2018-12-28 | Stop reason: HOSPADM

## 2018-12-28 RX ORDER — MEPERIDINE HYDROCHLORIDE 25 MG/ML
12.5 INJECTION INTRAMUSCULAR; INTRAVENOUS; SUBCUTANEOUS
Status: DISCONTINUED | OUTPATIENT
Start: 2018-12-28 | End: 2018-12-28 | Stop reason: HOSPADM

## 2018-12-28 RX ORDER — AMLODIPINE BESYLATE 10 MG/1
10 TABLET ORAL
Status: DISCONTINUED | OUTPATIENT
Start: 2018-12-28 | End: 2018-12-30 | Stop reason: HOSPADM

## 2018-12-28 RX ORDER — ONDANSETRON 2 MG/ML
4 INJECTION INTRAMUSCULAR; INTRAVENOUS ONCE AS NEEDED
Status: DISCONTINUED | OUTPATIENT
Start: 2018-12-28 | End: 2018-12-28 | Stop reason: HOSPADM

## 2018-12-28 RX ORDER — FENTANYL CITRATE 50 UG/ML
INJECTION, SOLUTION INTRAMUSCULAR; INTRAVENOUS AS NEEDED
Status: DISCONTINUED | OUTPATIENT
Start: 2018-12-28 | End: 2018-12-28 | Stop reason: SURG

## 2018-12-28 RX ORDER — SODIUM CHLORIDE, SODIUM LACTATE, POTASSIUM CHLORIDE, CALCIUM CHLORIDE 600; 310; 30; 20 MG/100ML; MG/100ML; MG/100ML; MG/100ML
125 INJECTION, SOLUTION INTRAVENOUS CONTINUOUS
Status: DISCONTINUED | OUTPATIENT
Start: 2018-12-28 | End: 2018-12-28

## 2018-12-28 RX ORDER — ONDANSETRON 2 MG/ML
INJECTION INTRAMUSCULAR; INTRAVENOUS AS NEEDED
Status: DISCONTINUED | OUTPATIENT
Start: 2018-12-28 | End: 2018-12-28 | Stop reason: SURG

## 2018-12-28 RX ADMIN — PROPOFOL 50 MG: 10 INJECTION, EMULSION INTRAVENOUS at 19:10

## 2018-12-28 RX ADMIN — SODIUM CHLORIDE, PRESERVATIVE FREE 3 ML: 5 INJECTION INTRAVENOUS at 08:08

## 2018-12-28 RX ADMIN — INSULIN ASPART 3 UNITS: 100 INJECTION, SOLUTION INTRAVENOUS; SUBCUTANEOUS at 17:21

## 2018-12-28 RX ADMIN — INSULIN ASPART 4 UNITS: 100 INJECTION, SOLUTION INTRAVENOUS; SUBCUTANEOUS at 08:02

## 2018-12-28 RX ADMIN — GABAPENTIN 400 MG: 400 CAPSULE ORAL at 13:18

## 2018-12-28 RX ADMIN — HYDROMORPHONE HYDROCHLORIDE 0.5 MG: 1 INJECTION, SOLUTION INTRAMUSCULAR; INTRAVENOUS; SUBCUTANEOUS at 16:19

## 2018-12-28 RX ADMIN — ONDANSETRON 4 MG: 2 INJECTION, SOLUTION INTRAMUSCULAR; INTRAVENOUS at 20:18

## 2018-12-28 RX ADMIN — FENTANYL CITRATE 50 MCG: 50 INJECTION INTRAMUSCULAR; INTRAVENOUS at 21:34

## 2018-12-28 RX ADMIN — INSULIN ASPART 3 UNITS: 100 INJECTION, SOLUTION INTRAVENOUS; SUBCUTANEOUS at 11:21

## 2018-12-28 RX ADMIN — FENTANYL CITRATE 50 MCG: 50 INJECTION INTRAMUSCULAR; INTRAVENOUS at 21:35

## 2018-12-28 RX ADMIN — HYDRALAZINE HYDROCHLORIDE 10 MG: 20 INJECTION INTRAMUSCULAR; INTRAVENOUS at 15:19

## 2018-12-28 RX ADMIN — HYDROMORPHONE HYDROCHLORIDE 0.5 MG: 1 INJECTION, SOLUTION INTRAMUSCULAR; INTRAVENOUS; SUBCUTANEOUS at 13:18

## 2018-12-28 RX ADMIN — DEXAMETHASONE SODIUM PHOSPHATE 8 MG: 4 INJECTION, SOLUTION INTRAMUSCULAR; INTRAVENOUS at 20:18

## 2018-12-28 RX ADMIN — FENTANYL CITRATE 100 MCG: 50 INJECTION INTRAMUSCULAR; INTRAVENOUS at 19:16

## 2018-12-28 RX ADMIN — HYDROMORPHONE HYDROCHLORIDE 0.5 MG: 1 INJECTION, SOLUTION INTRAMUSCULAR; INTRAVENOUS; SUBCUTANEOUS at 03:05

## 2018-12-28 RX ADMIN — INSULIN ASPART 5 UNITS: 100 INJECTION, SOLUTION INTRAVENOUS; SUBCUTANEOUS at 17:21

## 2018-12-28 RX ADMIN — PANTOPRAZOLE SODIUM 40 MG: 40 INJECTION, POWDER, FOR SOLUTION INTRAVENOUS at 08:01

## 2018-12-28 RX ADMIN — CLINDAMYCIN PHOSPHATE 900 MG: 900 INJECTION, SOLUTION INTRAVENOUS at 23:34

## 2018-12-28 RX ADMIN — SODIUM CHLORIDE 100 ML/HR: 9 INJECTION, SOLUTION INTRAVENOUS at 23:35

## 2018-12-28 RX ADMIN — FLUDROCORTISONE ACETATE 0.1 MG: 0.1 TABLET ORAL at 08:01

## 2018-12-28 RX ADMIN — ATENOLOL 25 MG: 25 TABLET ORAL at 08:02

## 2018-12-28 RX ADMIN — FAMOTIDINE 20 MG: 10 INJECTION, SOLUTION INTRAVENOUS at 20:18

## 2018-12-28 RX ADMIN — HYDROMORPHONE HYDROCHLORIDE 0.5 MG: 1 INJECTION, SOLUTION INTRAMUSCULAR; INTRAVENOUS; SUBCUTANEOUS at 08:15

## 2018-12-28 RX ADMIN — SODIUM CHLORIDE, POTASSIUM CHLORIDE, SODIUM LACTATE AND CALCIUM CHLORIDE: 600; 310; 30; 20 INJECTION, SOLUTION INTRAVENOUS at 19:07

## 2018-12-28 RX ADMIN — MIDAZOLAM HYDROCHLORIDE 2 MG: 1 INJECTION, SOLUTION INTRAMUSCULAR; INTRAVENOUS at 19:00

## 2018-12-28 RX ADMIN — PANTOPRAZOLE SODIUM 40 MG: 40 INJECTION, POWDER, FOR SOLUTION INTRAVENOUS at 17:23

## 2018-12-28 RX ADMIN — HYDROMORPHONE HYDROCHLORIDE 0.5 MG: 1 INJECTION, SOLUTION INTRAMUSCULAR; INTRAVENOUS; SUBCUTANEOUS at 23:34

## 2018-12-28 NOTE — ANESTHESIA PREPROCEDURE EVALUATION
Anesthesia Evaluation     Patient summary reviewed   no history of anesthetic complications:  NPO Solid Status: > 8 hours  NPO Liquid Status: > 8 hours           Airway   Mallampati: II  TM distance: >3 FB  Neck ROM: full  Dental - normal exam     Pulmonary - negative pulmonary ROS and normal exam   Cardiovascular     Rhythm: regular  Rate: normal    (+) hypertension well controlled, hyperlipidemia,       Neuro/Psych  (+) psychiatric history Depression,     GI/Hepatic/Renal/Endo    (+)   diabetes mellitus type 2 poorly controlled using insulin,     Musculoskeletal     Abdominal  - normal exam   Substance History - negative use     OB/GYN negative ob/gyn ROS         Other   (+) arthritis                     Anesthesia Plan    ASA 3     general     intravenous induction   Anesthetic plan, all risks, benefits, and alternatives have been provided, discussed and informed consent has been obtained with: patient.

## 2018-12-29 LAB
ANION GAP SERPL CALCULATED.3IONS-SCNC: 8.2 MMOL/L (ref 3.6–11.2)
ANION GAP SERPL CALCULATED.3IONS-SCNC: 9.6 MMOL/L (ref 3.6–11.2)
BASOPHILS # BLD AUTO: 0.01 10*3/MM3 (ref 0–0.3)
BASOPHILS NFR BLD AUTO: 0.2 % (ref 0–2)
BUN BLD-MCNC: 25 MG/DL (ref 7–21)
BUN BLD-MCNC: 29 MG/DL (ref 7–21)
BUN/CREAT SERPL: 21 (ref 7–25)
BUN/CREAT SERPL: 22 (ref 7–25)
CALCIUM SPEC-SCNC: 7.9 MG/DL (ref 7.7–10)
CALCIUM SPEC-SCNC: 7.9 MG/DL (ref 7.7–10)
CHLORIDE SERPL-SCNC: 103 MMOL/L (ref 99–112)
CHLORIDE SERPL-SCNC: 104 MMOL/L (ref 99–112)
CO2 SERPL-SCNC: 26.4 MMOL/L (ref 24.3–31.9)
CO2 SERPL-SCNC: 27.8 MMOL/L (ref 24.3–31.9)
CREAT BLD-MCNC: 1.19 MG/DL (ref 0.43–1.29)
CREAT BLD-MCNC: 1.32 MG/DL (ref 0.43–1.29)
DEPRECATED RDW RBC AUTO: 43.9 FL (ref 37–54)
EOSINOPHIL # BLD AUTO: 0 10*3/MM3 (ref 0–0.7)
EOSINOPHIL NFR BLD AUTO: 0 % (ref 0–5)
ERYTHROCYTE [DISTWIDTH] IN BLOOD BY AUTOMATED COUNT: 13.8 % (ref 11.5–14.5)
GFR SERPL CREATININE-BSD FRML MDRD: 41 ML/MIN/1.73
GFR SERPL CREATININE-BSD FRML MDRD: 46 ML/MIN/1.73
GLUCOSE BLD-MCNC: 404 MG/DL (ref 70–110)
GLUCOSE BLD-MCNC: 454 MG/DL (ref 70–110)
GLUCOSE BLDC GLUCOMTR-MCNC: 380 MG/DL (ref 70–130)
GLUCOSE BLDC GLUCOMTR-MCNC: 403 MG/DL (ref 70–130)
GLUCOSE BLDC GLUCOMTR-MCNC: 479 MG/DL (ref 70–130)
GLUCOSE BLDC GLUCOMTR-MCNC: 489 MG/DL (ref 70–130)
GLUCOSE BLDC GLUCOMTR-MCNC: 541 MG/DL (ref 70–130)
HBA1C MFR BLD: 9.4 % (ref 4.5–5.7)
HCT VFR BLD AUTO: 30.2 % (ref 37–47)
HGB BLD-MCNC: 9.7 G/DL (ref 12–16)
IMM GRANULOCYTES # BLD AUTO: 0.03 10*3/MM3 (ref 0–0.03)
IMM GRANULOCYTES NFR BLD AUTO: 0.5 % (ref 0–0.5)
LYMPHOCYTES # BLD AUTO: 0.44 10*3/MM3 (ref 1–3)
LYMPHOCYTES NFR BLD AUTO: 7.7 % (ref 21–51)
MAGNESIUM SERPL-MCNC: 1.7 MG/DL (ref 1.7–2.6)
MCH RBC QN AUTO: 29.1 PG (ref 27–33)
MCHC RBC AUTO-ENTMCNC: 32.1 G/DL (ref 33–37)
MCV RBC AUTO: 90.7 FL (ref 80–94)
MONOCYTES # BLD AUTO: 0.05 10*3/MM3 (ref 0.1–0.9)
MONOCYTES NFR BLD AUTO: 0.9 % (ref 0–10)
NEUTROPHILS # BLD AUTO: 5.21 10*3/MM3 (ref 1.4–6.5)
NEUTROPHILS NFR BLD AUTO: 90.7 % (ref 30–70)
OSMOLALITY SERPL CALC.SUM OF ELEC: 301.7 MOSM/KG (ref 273–305)
OSMOLALITY SERPL CALC.SUM OF ELEC: 302.2 MOSM/KG (ref 273–305)
PHOSPHATE SERPL-MCNC: 4.7 MG/DL (ref 2.7–4.5)
PLATELET # BLD AUTO: 185 10*3/MM3 (ref 130–400)
PMV BLD AUTO: 9.4 FL (ref 6–10)
POTASSIUM BLD-SCNC: 4.2 MMOL/L (ref 3.5–5.3)
POTASSIUM BLD-SCNC: 4.8 MMOL/L (ref 3.5–5.3)
RBC # BLD AUTO: 3.33 10*6/MM3 (ref 4.2–5.4)
SODIUM BLD-SCNC: 139 MMOL/L (ref 135–153)
SODIUM BLD-SCNC: 140 MMOL/L (ref 135–153)
T4 FREE SERPL-MCNC: 1.1 NG/DL (ref 0.89–1.76)
TSH SERPL DL<=0.05 MIU/L-ACNC: 1.41 MIU/ML (ref 0.55–4.78)
WBC NRBC COR # BLD: 5.74 10*3/MM3 (ref 4.5–12.5)

## 2018-12-29 PROCEDURE — 99233 SBSQ HOSP IP/OBS HIGH 50: CPT | Performed by: INTERNAL MEDICINE

## 2018-12-29 PROCEDURE — 80048 BASIC METABOLIC PNL TOTAL CA: CPT | Performed by: INTERNAL MEDICINE

## 2018-12-29 PROCEDURE — 63710000001 INSULIN ASPART PER 5 UNITS: Performed by: INTERNAL MEDICINE

## 2018-12-29 PROCEDURE — 63710000001 INSULIN ASPART PER 5 UNITS: Performed by: HOSPITALIST

## 2018-12-29 PROCEDURE — 25010000002 ENOXAPARIN PER 10 MG: Performed by: ORTHOPAEDIC SURGERY

## 2018-12-29 PROCEDURE — 85025 COMPLETE CBC W/AUTO DIFF WBC: CPT | Performed by: INTERNAL MEDICINE

## 2018-12-29 PROCEDURE — 83735 ASSAY OF MAGNESIUM: CPT | Performed by: INTERNAL MEDICINE

## 2018-12-29 PROCEDURE — 84439 ASSAY OF FREE THYROXINE: CPT | Performed by: INTERNAL MEDICINE

## 2018-12-29 PROCEDURE — 97530 THERAPEUTIC ACTIVITIES: CPT

## 2018-12-29 PROCEDURE — G8979 MOBILITY GOAL STATUS: HCPCS

## 2018-12-29 PROCEDURE — 97163 PT EVAL HIGH COMPLEX 45 MIN: CPT

## 2018-12-29 PROCEDURE — G8978 MOBILITY CURRENT STATUS: HCPCS

## 2018-12-29 PROCEDURE — 63710000001 INSULIN DETEMIR PER 5 UNITS: Performed by: INTERNAL MEDICINE

## 2018-12-29 PROCEDURE — 83036 HEMOGLOBIN GLYCOSYLATED A1C: CPT | Performed by: INTERNAL MEDICINE

## 2018-12-29 PROCEDURE — 84100 ASSAY OF PHOSPHORUS: CPT | Performed by: INTERNAL MEDICINE

## 2018-12-29 PROCEDURE — 25010000002 HYDROMORPHONE PER 4 MG: Performed by: PHYSICIAN ASSISTANT

## 2018-12-29 PROCEDURE — 94799 UNLISTED PULMONARY SVC/PX: CPT

## 2018-12-29 PROCEDURE — 84443 ASSAY THYROID STIM HORMONE: CPT | Performed by: INTERNAL MEDICINE

## 2018-12-29 PROCEDURE — 82962 GLUCOSE BLOOD TEST: CPT

## 2018-12-29 PROCEDURE — 63710000001 INSULIN REGULAR HUMAN PER 5 UNITS

## 2018-12-29 RX ORDER — SODIUM CHLORIDE 9 MG/ML
100 INJECTION, SOLUTION INTRAVENOUS CONTINUOUS
Status: DISCONTINUED | OUTPATIENT
Start: 2018-12-29 | End: 2018-12-30

## 2018-12-29 RX ADMIN — GABAPENTIN 400 MG: 400 CAPSULE ORAL at 06:31

## 2018-12-29 RX ADMIN — PANTOPRAZOLE SODIUM 40 MG: 40 INJECTION, POWDER, FOR SOLUTION INTRAVENOUS at 17:11

## 2018-12-29 RX ADMIN — GABAPENTIN 400 MG: 400 CAPSULE ORAL at 13:07

## 2018-12-29 RX ADMIN — TEMAZEPAM 30 MG: 15 CAPSULE ORAL at 20:05

## 2018-12-29 RX ADMIN — INSULIN ASPART 5 UNITS: 100 INJECTION, SOLUTION INTRAVENOUS; SUBCUTANEOUS at 08:34

## 2018-12-29 RX ADMIN — HYDROCODONE BITARTRATE AND ACETAMINOPHEN 1 TABLET: 10; 325 TABLET ORAL at 14:13

## 2018-12-29 RX ADMIN — DOCUSATE SODIUM 100 MG: 100 CAPSULE ORAL at 08:33

## 2018-12-29 RX ADMIN — SODIUM CHLORIDE 100 ML/HR: 9 INJECTION, SOLUTION INTRAVENOUS at 17:34

## 2018-12-29 RX ADMIN — ESCITALOPRAM 10 MG: 10 TABLET, FILM COATED ORAL at 20:05

## 2018-12-29 RX ADMIN — SODIUM CHLORIDE, PRESERVATIVE FREE 3 ML: 5 INJECTION INTRAVENOUS at 20:04

## 2018-12-29 RX ADMIN — PANTOPRAZOLE SODIUM 40 MG: 40 INJECTION, POWDER, FOR SOLUTION INTRAVENOUS at 08:33

## 2018-12-29 RX ADMIN — FLUDROCORTISONE ACETATE 0.1 MG: 0.1 TABLET ORAL at 08:35

## 2018-12-29 RX ADMIN — SODIUM CHLORIDE 100 ML/HR: 9 INJECTION, SOLUTION INTRAVENOUS at 08:35

## 2018-12-29 RX ADMIN — HUMAN INSULIN 15 UNITS: 100 INJECTION, SOLUTION SUBCUTANEOUS at 11:25

## 2018-12-29 RX ADMIN — INSULIN ASPART 8 UNITS: 100 INJECTION, SOLUTION INTRAVENOUS; SUBCUTANEOUS at 20:04

## 2018-12-29 RX ADMIN — INSULIN ASPART 7 UNITS: 100 INJECTION, SOLUTION INTRAVENOUS; SUBCUTANEOUS at 08:33

## 2018-12-29 RX ADMIN — HYDROMORPHONE HYDROCHLORIDE 0.5 MG: 1 INJECTION, SOLUTION INTRAMUSCULAR; INTRAVENOUS; SUBCUTANEOUS at 20:19

## 2018-12-29 RX ADMIN — HYDROMORPHONE HYDROCHLORIDE 0.5 MG: 1 INJECTION, SOLUTION INTRAMUSCULAR; INTRAVENOUS; SUBCUTANEOUS at 03:45

## 2018-12-29 RX ADMIN — CLINDAMYCIN PHOSPHATE 900 MG: 900 INJECTION, SOLUTION INTRAVENOUS at 23:38

## 2018-12-29 RX ADMIN — ATENOLOL 25 MG: 25 TABLET ORAL at 08:33

## 2018-12-29 RX ADMIN — HYDROMORPHONE HYDROCHLORIDE 0.5 MG: 1 INJECTION, SOLUTION INTRAMUSCULAR; INTRAVENOUS; SUBCUTANEOUS at 08:33

## 2018-12-29 RX ADMIN — INSULIN ASPART 5 UNITS: 100 INJECTION, SOLUTION INTRAVENOUS; SUBCUTANEOUS at 11:25

## 2018-12-29 RX ADMIN — INSULIN ASPART 10 UNITS: 100 INJECTION, SOLUTION INTRAVENOUS; SUBCUTANEOUS at 17:11

## 2018-12-29 RX ADMIN — GABAPENTIN 400 MG: 400 CAPSULE ORAL at 20:05

## 2018-12-29 RX ADMIN — CLINDAMYCIN PHOSPHATE 900 MG: 900 INJECTION, SOLUTION INTRAVENOUS at 08:33

## 2018-12-29 RX ADMIN — INSULIN DETEMIR 20 UNITS: 100 INJECTION, SOLUTION SUBCUTANEOUS at 21:32

## 2018-12-29 RX ADMIN — INSULIN ASPART 7 UNITS: 100 INJECTION, SOLUTION INTRAVENOUS; SUBCUTANEOUS at 11:24

## 2018-12-29 RX ADMIN — CLINDAMYCIN PHOSPHATE 900 MG: 900 INJECTION, SOLUTION INTRAVENOUS at 15:12

## 2018-12-29 RX ADMIN — ENOXAPARIN SODIUM 40 MG: 40 INJECTION SUBCUTANEOUS at 08:33

## 2018-12-29 RX ADMIN — ATENOLOL 25 MG: 25 TABLET ORAL at 20:05

## 2018-12-29 RX ADMIN — AMLODIPINE BESYLATE 10 MG: 10 TABLET ORAL at 08:34

## 2018-12-29 RX ADMIN — SODIUM CHLORIDE 500 ML: 9 INJECTION, SOLUTION INTRAVENOUS at 11:29

## 2018-12-29 NOTE — ANESTHESIA PROCEDURE NOTES
ANESTHESIA INTUBATION  Urgency: elective    Date/Time: 12/28/2018 7:10 PM  End Time:12/28/2018 7:10 PM    General Information and Staff    Patient location during procedure: OR    Indications and Patient Condition  Indications for airway management: airway protection    Preoxygenated: yes  Mask difficulty assessment: 1 - vent by mask    Final Airway Details  Final airway type: supraglottic airway      Successful airway: classic  Size 3  Cuff Pressure (cm H2O): 10  Airway Seal Pressure (cm H2O): 10    Number of attempts at approach: 1

## 2018-12-29 NOTE — ANESTHESIA POSTPROCEDURE EVALUATION
Patient: Reny Elena    Procedure Summary     Date:  12/28/18 Room / Location:  [facility] OR  / [facility] OR    Anesthesia Start:  1907 Anesthesia Stop:      Procedure:  TIBIAL  FRACTURE OPEN REDUCTION INTERNAL FIXATION (Right Leg Lower) Diagnosis:       Closed fracture of right tibia and fibula, initial encounter      (Closed fracture of right tibia and fibula, initial encounter [S82.201A, S82.401A])    Surgeon:  Jung Barragan MD Provider:  John Coronel MD    Anesthesia Type:  general ASA Status:  3          Anesthesia Type: general  Last vitals  BP   151/53 (12/28/18 2219)   Temp   97.6 °F (36.4 °C) (12/28/18 2219)   Pulse   74 (12/28/18 2219)   Resp   14 (12/28/18 2219)     SpO2   100 % (12/28/18 2223)     Post Anesthesia Care and Evaluation    Patient location during evaluation: bedside  Patient participation: complete - patient participated  Level of consciousness: awake and alert  Pain score: 1  Pain management: adequate  Airway patency: patent  Anesthetic complications: No anesthetic complications  PONV Status: none  Cardiovascular status: acceptable  Respiratory status: acceptable  Hydration status: acceptable

## 2018-12-30 VITALS
RESPIRATION RATE: 18 BRPM | HEART RATE: 63 BPM | WEIGHT: 161.6 LBS | HEIGHT: 65 IN | SYSTOLIC BLOOD PRESSURE: 130 MMHG | DIASTOLIC BLOOD PRESSURE: 60 MMHG | TEMPERATURE: 97.6 F | OXYGEN SATURATION: 97 % | BODY MASS INDEX: 26.92 KG/M2

## 2018-12-30 PROBLEM — D50.9 IRON DEFICIENCY ANEMIA: Status: ACTIVE | Noted: 2018-12-30

## 2018-12-30 PROBLEM — E11.65 TYPE 2 DIABETES MELLITUS WITH HYPERGLYCEMIA, WITH LONG-TERM CURRENT USE OF INSULIN (HCC): Status: ACTIVE | Noted: 2018-12-30

## 2018-12-30 PROBLEM — Z79.4 TYPE 2 DIABETES MELLITUS WITH HYPERGLYCEMIA, WITH LONG-TERM CURRENT USE OF INSULIN: Status: ACTIVE | Noted: 2018-12-30

## 2018-12-30 LAB
ANION GAP SERPL CALCULATED.3IONS-SCNC: 7.8 MMOL/L (ref 3.6–11.2)
BASOPHILS # BLD AUTO: 0.01 10*3/MM3 (ref 0–0.3)
BASOPHILS NFR BLD AUTO: 0.1 % (ref 0–2)
BUN BLD-MCNC: 27 MG/DL (ref 7–21)
BUN/CREAT SERPL: 23.7 (ref 7–25)
CALCIUM SPEC-SCNC: 8.3 MG/DL (ref 7.7–10)
CHLORIDE SERPL-SCNC: 103 MMOL/L (ref 99–112)
CO2 SERPL-SCNC: 27.2 MMOL/L (ref 24.3–31.9)
CREAT BLD-MCNC: 1.14 MG/DL (ref 0.43–1.29)
DEPRECATED RDW RBC AUTO: 46.1 FL (ref 37–54)
EOSINOPHIL # BLD AUTO: 0.26 10*3/MM3 (ref 0–0.7)
EOSINOPHIL NFR BLD AUTO: 3.7 % (ref 0–5)
ERYTHROCYTE [DISTWIDTH] IN BLOOD BY AUTOMATED COUNT: 14.3 % (ref 11.5–14.5)
FERRITIN SERPL-MCNC: 302 NG/ML (ref 10–290.3)
GFR SERPL CREATININE-BSD FRML MDRD: 49 ML/MIN/1.73
GLUCOSE BLD-MCNC: 152 MG/DL (ref 70–110)
GLUCOSE BLDC GLUCOMTR-MCNC: 146 MG/DL (ref 70–130)
GLUCOSE BLDC GLUCOMTR-MCNC: 305 MG/DL (ref 70–130)
HCT VFR BLD AUTO: 24.4 % (ref 37–47)
HCT VFR BLD AUTO: 25.1 % (ref 37–47)
HGB BLD-MCNC: 7.9 G/DL (ref 12–16)
HGB BLD-MCNC: 8 G/DL (ref 12–16)
IMM GRANULOCYTES # BLD AUTO: 0.01 10*3/MM3 (ref 0–0.03)
IMM GRANULOCYTES NFR BLD AUTO: 0.1 % (ref 0–0.5)
IRON 24H UR-MRATE: 29 MCG/DL (ref 49–151)
IRON SATN MFR SERPL: 12 % (ref 15–50)
LYMPHOCYTES # BLD AUTO: 1.9 10*3/MM3 (ref 1–3)
LYMPHOCYTES NFR BLD AUTO: 27.2 % (ref 21–51)
MAGNESIUM SERPL-MCNC: 1.7 MG/DL (ref 1.7–2.6)
MCH RBC QN AUTO: 28.5 PG (ref 27–33)
MCHC RBC AUTO-ENTMCNC: 32.4 G/DL (ref 33–37)
MCV RBC AUTO: 88.1 FL (ref 80–94)
MONOCYTES # BLD AUTO: 0.61 10*3/MM3 (ref 0.1–0.9)
MONOCYTES NFR BLD AUTO: 8.7 % (ref 0–10)
NEUTROPHILS # BLD AUTO: 4.19 10*3/MM3 (ref 1.4–6.5)
NEUTROPHILS NFR BLD AUTO: 60.2 % (ref 30–70)
OSMOLALITY SERPL CALC.SUM OF ELEC: 283.8 MOSM/KG (ref 273–305)
PHOSPHATE SERPL-MCNC: 3.4 MG/DL (ref 2.7–4.5)
PLATELET # BLD AUTO: 169 10*3/MM3 (ref 130–400)
PMV BLD AUTO: 9.6 FL (ref 6–10)
POTASSIUM BLD-SCNC: 3.9 MMOL/L (ref 3.5–5.3)
RBC # BLD AUTO: 2.77 10*6/MM3 (ref 4.2–5.4)
SODIUM BLD-SCNC: 138 MMOL/L (ref 135–153)
TIBC SERPL-MCNC: 238 MCG/DL (ref 241–421)
WBC NRBC COR # BLD: 6.98 10*3/MM3 (ref 4.5–12.5)

## 2018-12-30 PROCEDURE — 83735 ASSAY OF MAGNESIUM: CPT | Performed by: INTERNAL MEDICINE

## 2018-12-30 PROCEDURE — 25010000002 HYDROMORPHONE PER 4 MG: Performed by: PHYSICIAN ASSISTANT

## 2018-12-30 PROCEDURE — 85014 HEMATOCRIT: CPT | Performed by: INTERNAL MEDICINE

## 2018-12-30 PROCEDURE — 82962 GLUCOSE BLOOD TEST: CPT

## 2018-12-30 PROCEDURE — 63710000001 INSULIN ASPART PER 5 UNITS: Performed by: INTERNAL MEDICINE

## 2018-12-30 PROCEDURE — 83540 ASSAY OF IRON: CPT | Performed by: INTERNAL MEDICINE

## 2018-12-30 PROCEDURE — 85025 COMPLETE CBC W/AUTO DIFF WBC: CPT | Performed by: INTERNAL MEDICINE

## 2018-12-30 PROCEDURE — 83550 IRON BINDING TEST: CPT | Performed by: INTERNAL MEDICINE

## 2018-12-30 PROCEDURE — 99239 HOSP IP/OBS DSCHRG MGMT >30: CPT | Performed by: INTERNAL MEDICINE

## 2018-12-30 PROCEDURE — 85018 HEMOGLOBIN: CPT | Performed by: INTERNAL MEDICINE

## 2018-12-30 PROCEDURE — 82728 ASSAY OF FERRITIN: CPT | Performed by: INTERNAL MEDICINE

## 2018-12-30 PROCEDURE — 25010000002 ENOXAPARIN PER 10 MG: Performed by: ORTHOPAEDIC SURGERY

## 2018-12-30 PROCEDURE — 80048 BASIC METABOLIC PNL TOTAL CA: CPT | Performed by: INTERNAL MEDICINE

## 2018-12-30 PROCEDURE — 84100 ASSAY OF PHOSPHORUS: CPT | Performed by: INTERNAL MEDICINE

## 2018-12-30 RX ORDER — IRON POLYSACCHARIDE COMPLEX 150 MG
150 CAPSULE ORAL DAILY
Qty: 30 CAPSULE | Refills: 0 | Status: SHIPPED | OUTPATIENT
Start: 2018-12-31 | End: 2018-12-30

## 2018-12-30 RX ORDER — PANTOPRAZOLE SODIUM 40 MG/1
40 TABLET, DELAYED RELEASE ORAL
Status: DISCONTINUED | OUTPATIENT
Start: 2018-12-31 | End: 2018-12-30 | Stop reason: HOSPADM

## 2018-12-30 RX ORDER — IRON POLYSACCHARIDE COMPLEX 150 MG
150 CAPSULE ORAL DAILY
Status: DISCONTINUED | OUTPATIENT
Start: 2018-12-30 | End: 2018-12-30 | Stop reason: HOSPADM

## 2018-12-30 RX ORDER — IRON POLYSACCHARIDE COMPLEX 150 MG
150 CAPSULE ORAL DAILY
Qty: 30 CAPSULE | Refills: 0 | Status: ON HOLD | OUTPATIENT
Start: 2018-12-31 | End: 2019-03-16

## 2018-12-30 RX ADMIN — ATENOLOL 25 MG: 25 TABLET ORAL at 09:04

## 2018-12-30 RX ADMIN — HYDROCODONE BITARTRATE AND ACETAMINOPHEN 1 TABLET: 10; 325 TABLET ORAL at 09:05

## 2018-12-30 RX ADMIN — AMLODIPINE BESYLATE 10 MG: 10 TABLET ORAL at 09:04

## 2018-12-30 RX ADMIN — GABAPENTIN 400 MG: 400 CAPSULE ORAL at 04:59

## 2018-12-30 RX ADMIN — PANTOPRAZOLE SODIUM 40 MG: 40 INJECTION, POWDER, FOR SOLUTION INTRAVENOUS at 09:04

## 2018-12-30 RX ADMIN — Medication 150 MG: at 11:14

## 2018-12-30 RX ADMIN — INSULIN ASPART 10 UNITS: 100 INJECTION, SOLUTION INTRAVENOUS; SUBCUTANEOUS at 09:05

## 2018-12-30 RX ADMIN — INSULIN ASPART 7 UNITS: 100 INJECTION, SOLUTION INTRAVENOUS; SUBCUTANEOUS at 11:05

## 2018-12-30 RX ADMIN — HYDROMORPHONE HYDROCHLORIDE 0.5 MG: 1 INJECTION, SOLUTION INTRAMUSCULAR; INTRAVENOUS; SUBCUTANEOUS at 05:58

## 2018-12-30 RX ADMIN — CLINDAMYCIN PHOSPHATE 900 MG: 900 INJECTION, SOLUTION INTRAVENOUS at 09:05

## 2018-12-30 RX ADMIN — INSULIN ASPART 10 UNITS: 100 INJECTION, SOLUTION INTRAVENOUS; SUBCUTANEOUS at 11:05

## 2018-12-30 RX ADMIN — SODIUM CHLORIDE, PRESERVATIVE FREE 3 ML: 5 INJECTION INTRAVENOUS at 09:12

## 2018-12-30 RX ADMIN — SODIUM CHLORIDE 100 ML/HR: 9 INJECTION, SOLUTION INTRAVENOUS at 02:59

## 2018-12-30 RX ADMIN — FLUDROCORTISONE ACETATE 0.1 MG: 0.1 TABLET ORAL at 09:04

## 2018-12-30 RX ADMIN — GABAPENTIN 400 MG: 400 CAPSULE ORAL at 13:36

## 2018-12-30 RX ADMIN — ENOXAPARIN SODIUM 40 MG: 40 INJECTION SUBCUTANEOUS at 09:04

## 2018-12-31 ENCOUNTER — TELEPHONE (OUTPATIENT)
Dept: MEDSURG UNIT | Facility: HOSPITAL | Age: 58
End: 2018-12-31

## 2018-12-31 ENCOUNTER — READMISSION MANAGEMENT (OUTPATIENT)
Dept: CALL CENTER | Facility: HOSPITAL | Age: 58
End: 2018-12-31

## 2018-12-31 NOTE — OUTREACH NOTE
Prep Survey      Responses   Facility patient discharged from?  [facility]   Is patient eligible?  Yes   Discharge diagnosis  closed fx rt fibula and tibia, sp open reduction/internal fixation, DM2, HTN, CKD3   Does the patient have one of the following disease processes/diagnoses(primary or secondary)?  General Surgery   Does the patient have Home health ordered?  No   Is there a DME ordered?  No   Prep survey completed?  Yes          Negin Castro RN

## 2019-01-03 ENCOUNTER — READMISSION MANAGEMENT (OUTPATIENT)
Dept: CALL CENTER | Facility: HOSPITAL | Age: 59
End: 2019-01-03

## 2019-01-03 NOTE — OUTREACH NOTE
General Surgery Week 1 Survey      Responses   Facility patient discharged from?  [facility]   Does the patient have one of the following disease processes/diagnoses(primary or secondary)?  General Surgery   Is there a successful TCM telephone encounter documented?  No   Week 1 attempt successful?  Yes   Call start time  1457   Call end time  1502   Discharge diagnosis  closed fx rt fibula and tibia, sp open reduction/internal fixation, DM2, HTN, CKD3   Meds reviewed with patient/caregiver?  Yes   Is the patient having any side effects they believe may be caused by any medication additions or changes?  No   Does the patient have all medications related to this admission filled (includes all antibiotics, pain medications, etc.)  Yes   Is the patient taking all medications as directed (includes completed medication regime)?  Yes   Does the patient have a follow up appointment scheduled with their surgeon?  Yes   Has the patient kept scheduled appointments due by today?  N/A   Has home health visited the patient within 72 hours of discharge?  N/A   Psychosocial issues?  No   Did the patient receive a copy of their discharge instructions?  Yes   Nursing interventions  Reviewed instructions with patient   What is the patient's perception of their health status since discharge?  Same   Nursing interventions  Nurse provided patient education   Is the patient /caregiver able to teach back basic post-op care?  Continue use of incentive spirometry at least 1 week post discharge, Practice 'cough and deep breath', Take showers only when approved by MD-sponge bathe until then, Keep incision areas clean,dry and protected   Is the patient/caregiver able to teach back steps to recovery at home?  Rest and rebuild strength, gradually increase activity   Additional teach back comments  Pt still having a lot of pain and pain med does not help. Incision is to be covered and not removed until nex Dr visit.    Week 1 call completed?  Yes           Camilla Kelly RN

## 2019-01-10 ENCOUNTER — READMISSION MANAGEMENT (OUTPATIENT)
Dept: CALL CENTER | Facility: HOSPITAL | Age: 59
End: 2019-01-10

## 2019-01-10 NOTE — OUTREACH NOTE
General Surgery Week 2 Survey      Responses   Facility patient discharged from?  [facility]   Does the patient have one of the following disease processes/diagnoses(primary or secondary)?  General Surgery   Week 2 attempt successful?  Yes   Call start time  0750   Call end time  0755   Discharge diagnosis  closed fx rt fibula and tibia, sp open reduction/internal fixation, DM2, HTN, CKD3   Is patient permission given to speak with other caregiver?  No   Meds reviewed with patient/caregiver?  Yes   Is the patient taking all medications as directed (includes completed medication regime)?  Yes   Has the patient kept scheduled appointments due by today?  Yes   Comments  1/17/19 will see ortho   What is the patient's perception of their health status since discharge?  Same   Is the patient/caregiver able to teach back signs and symptoms of incisional infection?  Increased redness, swelling or pain at the incisonal site, Increased drainage or bleeding, Incisional warmth, Pus or odor from incision, Fever   Week 2 call completed?  Yes   Wrap up additional comments  This same leg has been broken twice before, broke a titanium nail this time. Doing the exercises she was shown to do. Pain rated today 8. Just woke up and has not had her pain med yet this morning. States the norco does not do much for her. Dilaudid does help but makes her hallucinate. Percocet makes her vomit. Morphine causes a rash.  I suggested muscle relaxer and she states yes that does help and so does neurontin but is short lived. Call surgeon.           Lauren Rahman RN

## 2019-01-10 NOTE — ED PROVIDER NOTES
Subjective   History of Present Illness    Review of Systems    Past Medical History:   Diagnosis Date   • Arthritis    • Depression    • Essential hypertension    • Hyperlipidemia    • PAD (peripheral artery disease) (CMS/[facility])    • Type 2 diabetes mellitus (CMS/[facility])        Allergies   Allergen Reactions   • Latex      Added based on information entered during case entry, please review and add reactions, type, and severity as needed   • Morphine And Related Confusion   • Penicillins    • Codeine Rash     Pt tolerates Norco @ home   • Sulfa Antibiotics Rash       Past Surgical History:   Procedure Laterality Date   • BACK SURGERY      Post spinal diskectomy, osteophytectomy in one lumbar interspace   • CATARACT EXTRACTION      Left    •  SECTION     • DENTAL PROCEDURE     • HYSTERECTOMY     • INCISION AND DRAINAGE LEG Left 4/15/2017    Procedure: INCISION AND DRAINAGE LOWER EXTREMITY;  Surgeon: Basilio Morris MD;  Location: [facility];  Service:    • INCISION AND DRAINAGE LEG Left 2017    Procedure: Irrigation and Debridment abcess diabetic wound left foot with deep culture;  Surgeon: Basilio Morris MD;  Location: [facility];  Service:    • KNEE ARTHROSCOPY Right    • ORIF TIBIA FRACTURE Right 2018    Procedure: TIBIAL  FRACTURE OPEN REDUCTION INTERNAL FIXATION;  Surgeon: Jung Barragan MD;  Location: [facility] OR;  Service: Orthopedics   • TOE AMPUTATION Right     5th   • TUBAL ABDOMINAL LIGATION         Family History   Problem Relation Age of Onset   • Heart disease Mother    • Cancer Mother    • COPD Mother    • Diabetes Other    • Depression Other        Social History     Socioeconomic History   • Marital status:      Spouse name: Not on file   • Number of children: Not on file   • Years of education: Not on file   • Highest education level: Not on file   Tobacco Use   • Smoking status: Never Smoker   Substance and Sexual Activity   • Alcohol use: No   • Drug use: No   • Sexual activity:  Defer           Objective   Physical Exam    Lumbar Puncture  Date/Time: 1/10/2019 3:43 PM  Performed by: González Perla MD  Authorized by: Rebeka Woodruff MD     Consent:     Consent obtained:  Verbal    Consent given by:  Patient    Risks discussed:  Bleeding, headache and infection  Pre-procedure details:     Procedure purpose:  Diagnostic  Sedation:     Sedation type:  Moderate (conscious) sedation  Procedure details:     Lumbar space:  L4-L5 interspace    Patient position:  L lateral decubitus    Needle gauge:  22    Ultrasound guidance: no      Number of attempts:  2  Post-procedure:     Puncture site:  Adhesive bandage applied    Patient tolerance of procedure:  Tolerated well, no immediate complications  Comments:      Unable to get spinal fluid                   ED Course  ED Course as of Quan 10 1753   Wed Nov 21, 2018 2121 Neg per Dr. Perla  XR Chest 1 View [ML]   2128 2100- Reviewed by Dr. Perla, rate 95, NSR, no ischemia  ECG 12 Lead [ML]   2203 D/W Dr. Mcdowell - recommends IV toradol for temp   [ML]   2222 No acute intracranial findings. Mucoperiosteal thickening in the paranasal sinuses without air fluid levels to suggest acute sinusitis per [facility]  CT Head Without Contrast [ML]   2346 Unsuccessful LP per Dr. Perla   [ML]   Thu Nov 22, 2018   0006 D/W  will accept to CCU Modality: Room Air [ML]      ED Course User Index  [ML] Veronica Glez PA                  [facility]      Final diagnoses:   Altered mental status, unspecified altered mental status type   Severe sepsis (CMS/HCC)   Hyperglycemia            González Perla MD  01/10/19 1544       González Perla MD  01/10/19 1841

## 2019-01-18 ENCOUNTER — READMISSION MANAGEMENT (OUTPATIENT)
Dept: CALL CENTER | Facility: HOSPITAL | Age: 59
End: 2019-01-18

## 2019-01-18 NOTE — OUTREACH NOTE
General Surgery Week 3 Survey      Responses   Facility patient discharged from?  [facility]   Does the patient have one of the following disease processes/diagnoses(primary or secondary)?  General Surgery   Week 3 attempt successful?  Yes   Call start time  0755   Call end time  0804   Discharge diagnosis  closed fx rt fibula and tibia, sp open reduction/internal fixation, DM2, HTN, CKD3   Meds reviewed with patient/caregiver?  N/A   Is the patient taking all medications as directed (includes completed medication regime)?  N/A   Has the patient kept scheduled appointments due by today?  Yes   Comments  Pt has seen ortho and is doing well.   What DME was ordered?  already has DMS at home-wheelchair, rolling walker,  bedside commode and shower chair   Psychosocial issues?  No   What is the patient's perception of their health status since discharge?  Same   Nursing interventions  Nurse provided patient education   Additional teach back comments  Pt continues to have pain and has been talking to her surgeon and pcp on how to manage this.   Week 3 call completed?  Yes   Wrap up additional comments  Pt continues to have pain and has to lay flat at home at all times to protect her foot.  If she injures that foot again it will have to be amputated.          Rika Magallanes RN

## 2019-01-25 ENCOUNTER — READMISSION MANAGEMENT (OUTPATIENT)
Dept: CALL CENTER | Facility: HOSPITAL | Age: 59
End: 2019-01-25

## 2019-01-25 NOTE — OUTREACH NOTE
General Surgery Week 4 Survey      Responses   Facility patient discharged from?  [facility]   Does the patient have one of the following disease processes/diagnoses(primary or secondary)?  General Surgery   Week 4 attempt successful?  Yes   Call start time  1703   Call end time  1707   Discharge diagnosis  closed fx rt fibula and tibia, sp open reduction/internal fixation, DM2, HTN, CKD3   Is the patient taking all medications as directed (includes completed medication regime)?  Yes   Has the patient kept scheduled appointments due by today?  Yes   Is the patient still receiving Home Health Services?  N/A   Psychosocial issues?  No   Comments  pt states is not yet completely wt bearing, still has some sutures remaining   What is the patient's perception of their health status since discharge?  Improving   Nursing interventions  Nurse provided patient education   Is the patient/caregiver able to teach back steps to recovery at home?  Set small, achievable goals for return to baseline health, Rest and rebuild strength, gradually increase activity   Is the patient/caregiver able to teach back the hierarchy of who to call/visit for symptoms/problems? PCP, Specialist, Home health nurse, Urgent Care, ED, 911  Yes   Additional teach back comments  pain is still problem, surgeon controls pain management, and then will go to pain clinic   Week 4 call completed?  Yes   Would the patient like one additional call?  No   Graduated  Yes   Did the patient feel the follow up calls were helpful during their recovery period?  Yes   Was the number of calls appropriate?  Yes          Lisette Little RN

## 2019-02-04 ENCOUNTER — HOSPITAL ENCOUNTER (OUTPATIENT)
Dept: INFUSION THERAPY | Facility: HOSPITAL | Age: 59
Setting detail: INFUSION SERIES
Discharge: HOME | End: 2019-02-04
Attending: ORTHOPAEDIC SURGERY

## 2019-02-04 ENCOUNTER — TRANSCRIBE ORDERS (OUTPATIENT)
Dept: INFUSION THERAPY | Facility: HOSPITAL | Age: 59
End: 2019-02-04

## 2019-02-04 DIAGNOSIS — S91.001A WOUND OF RIGHT ANKLE, INITIAL ENCOUNTER: Primary | ICD-10-CM

## 2019-02-04 DIAGNOSIS — S91.001A OPEN WOUND OF RIGHT ANKLE, INITIAL ENCOUNTER: Primary | ICD-10-CM

## 2019-02-04 PROCEDURE — 96365 THER/PROPH/DIAG IV INF INIT: CPT

## 2019-02-04 PROCEDURE — 25010000002 CEFTRIAXONE: Performed by: NURSE PRACTITIONER

## 2019-02-04 RX ORDER — FLUOXETINE HYDROCHLORIDE 20 MG/1
20 CAPSULE ORAL DAILY
COMMUNITY
End: 2019-08-08 | Stop reason: HOSPADM

## 2019-02-04 RX ADMIN — CEFTRIAXONE 2 G: 2 INJECTION, POWDER, FOR SOLUTION INTRAMUSCULAR; INTRAVENOUS at 16:17

## 2019-02-05 ENCOUNTER — APPOINTMENT (OUTPATIENT)
Dept: INFUSION THERAPY | Facility: HOSPITAL | Age: 59
End: 2019-02-05
Attending: ORTHOPAEDIC SURGERY

## 2019-02-12 ENCOUNTER — LAB (OUTPATIENT)
Dept: LAB | Facility: HOSPITAL | Age: 59
End: 2019-02-12

## 2019-02-12 ENCOUNTER — TRANSCRIBE ORDERS (OUTPATIENT)
Dept: ADMINISTRATIVE | Facility: HOSPITAL | Age: 59
End: 2019-02-12

## 2019-02-12 DIAGNOSIS — B99.9 INFECTION: Primary | ICD-10-CM

## 2019-02-12 DIAGNOSIS — B99.9 INFECTION: ICD-10-CM

## 2019-02-12 LAB
BASOPHILS # BLD AUTO: 0.04 10*3/MM3 (ref 0–0.3)
BASOPHILS NFR BLD AUTO: 0.9 % (ref 0–2)
CRP SERPL-MCNC: <0.5 MG/DL (ref 0–0.99)
DEPRECATED RDW RBC AUTO: 47.1 FL (ref 37–54)
EOSINOPHIL # BLD AUTO: 0.46 10*3/MM3 (ref 0–0.7)
EOSINOPHIL NFR BLD AUTO: 10.2 % (ref 0–5)
ERYTHROCYTE [DISTWIDTH] IN BLOOD BY AUTOMATED COUNT: 14.8 % (ref 11.5–14.5)
ERYTHROCYTE [SEDIMENTATION RATE] IN BLOOD: 60 MM/HR (ref 0–30)
HCT VFR BLD AUTO: 35.3 % (ref 37–47)
HGB BLD-MCNC: 11.7 G/DL (ref 12–16)
IMM GRANULOCYTES # BLD AUTO: 0.01 10*3/MM3 (ref 0–0.03)
IMM GRANULOCYTES NFR BLD AUTO: 0.2 % (ref 0–0.5)
LYMPHOCYTES # BLD AUTO: 1.63 10*3/MM3 (ref 1–3)
LYMPHOCYTES NFR BLD AUTO: 36.2 % (ref 21–51)
MCH RBC QN AUTO: 29.2 PG (ref 27–33)
MCHC RBC AUTO-ENTMCNC: 33.1 G/DL (ref 33–37)
MCV RBC AUTO: 88 FL (ref 80–94)
MONOCYTES # BLD AUTO: 0.27 10*3/MM3 (ref 0.1–0.9)
MONOCYTES NFR BLD AUTO: 6 % (ref 0–10)
NEUTROPHILS # BLD AUTO: 2.09 10*3/MM3 (ref 1.4–6.5)
NEUTROPHILS NFR BLD AUTO: 46.5 % (ref 30–70)
PLATELET # BLD AUTO: 190 10*3/MM3 (ref 130–400)
PMV BLD AUTO: 9.5 FL (ref 6–10)
RBC # BLD AUTO: 4.01 10*6/MM3 (ref 4.2–5.4)
WBC NRBC COR # BLD: 4.5 10*3/MM3 (ref 4.5–12.5)

## 2019-02-12 PROCEDURE — 36415 COLL VENOUS BLD VENIPUNCTURE: CPT

## 2019-02-12 PROCEDURE — 86140 C-REACTIVE PROTEIN: CPT

## 2019-02-12 PROCEDURE — 85025 COMPLETE CBC W/AUTO DIFF WBC: CPT

## 2019-02-12 PROCEDURE — 85652 RBC SED RATE AUTOMATED: CPT

## 2019-02-13 ENCOUNTER — TRANSCRIBE ORDERS (OUTPATIENT)
Dept: INFUSION THERAPY | Facility: HOSPITAL | Age: 59
End: 2019-02-13

## 2019-02-13 DIAGNOSIS — T81.49XA SURGICAL WOUND INFECTION: Primary | ICD-10-CM

## 2019-02-14 ENCOUNTER — HOSPITAL ENCOUNTER (OUTPATIENT)
Dept: INFUSION THERAPY | Facility: HOSPITAL | Age: 59
Setting detail: INFUSION SERIES
Discharge: HOME | End: 2019-02-14
Attending: ORTHOPAEDIC SURGERY

## 2019-02-14 VITALS
BODY MASS INDEX: 20.83 KG/M2 | DIASTOLIC BLOOD PRESSURE: 86 MMHG | SYSTOLIC BLOOD PRESSURE: 170 MMHG | HEART RATE: 62 BPM | HEIGHT: 65 IN | RESPIRATION RATE: 17 BRPM | TEMPERATURE: 98.2 F | OXYGEN SATURATION: 99 % | WEIGHT: 125 LBS

## 2019-02-14 DIAGNOSIS — T81.49XA SURGICAL WOUND INFECTION: ICD-10-CM

## 2019-02-14 PROCEDURE — C1751 CATH, INF, PER/CENT/MIDLINE: HCPCS

## 2019-03-15 ENCOUNTER — HOSPITAL ENCOUNTER (INPATIENT)
Facility: HOSPITAL | Age: 59
LOS: 3 days | Discharge: HOME HEALTH | End: 2019-03-20
Attending: EMERGENCY MEDICINE | Admitting: INTERNAL MEDICINE

## 2019-03-15 ENCOUNTER — APPOINTMENT (OUTPATIENT)
Dept: GENERAL RADIOLOGY | Facility: HOSPITAL | Age: 59
End: 2019-03-15

## 2019-03-15 DIAGNOSIS — R31.29 HEMATURIA, MICROSCOPIC: ICD-10-CM

## 2019-03-15 DIAGNOSIS — R73.9 HYPERGLYCEMIA: Primary | ICD-10-CM

## 2019-03-15 DIAGNOSIS — N17.9 ACUTE KIDNEY INJURY (HCC): ICD-10-CM

## 2019-03-15 LAB
6-ACETYL MORPHINE: NEGATIVE
ALBUMIN SERPL-MCNC: 4.18 G/DL (ref 3.5–5.2)
ALBUMIN/GLOB SERPL: 1.1 G/DL
ALP SERPL-CCNC: 159 U/L (ref 39–117)
ALT SERPL W P-5'-P-CCNC: 10 U/L (ref 1–33)
AMPHET+METHAMPHET UR QL: NEGATIVE
ANION GAP SERPL CALCULATED.3IONS-SCNC: 18.3 MMOL/L
AST SERPL-CCNC: 18 U/L (ref 1–32)
BACTERIA UR QL AUTO: ABNORMAL /HPF
BARBITURATES UR QL SCN: NEGATIVE
BASOPHILS # BLD AUTO: 0.02 10*3/MM3 (ref 0–0.2)
BASOPHILS NFR BLD AUTO: 0.1 % (ref 0–1.5)
BENZODIAZ UR QL SCN: NEGATIVE
BILIRUB SERPL-MCNC: 0.6 MG/DL (ref 0.1–1.2)
BILIRUB UR QL STRIP: NEGATIVE
BUN BLD-MCNC: 19 MG/DL (ref 8–23)
BUN/CREAT SERPL: 15.6 (ref 7–25)
BUPRENORPHINE SERPL-MCNC: NEGATIVE NG/ML
CALCIUM SPEC-SCNC: 9.5 MG/DL (ref 8.6–10.5)
CANNABINOIDS SERPL QL: NEGATIVE
CHLORIDE SERPL-SCNC: 93 MMOL/L (ref 98–107)
CLARITY UR: ABNORMAL
CO2 SERPL-SCNC: 23.7 MMOL/L (ref 22–29)
COCAINE UR QL: NEGATIVE
COLOR UR: YELLOW
CREAT BLD-MCNC: 1.22 MG/DL (ref 0.57–1)
DEPRECATED RDW RBC AUTO: 40.9 FL (ref 37–54)
EOSINOPHIL # BLD AUTO: 0.01 10*3/MM3 (ref 0–0.4)
EOSINOPHIL NFR BLD AUTO: 0.1 % (ref 0.3–6.2)
ERYTHROCYTE [DISTWIDTH] IN BLOOD BY AUTOMATED COUNT: 13.4 % (ref 12.3–15.4)
ETHANOL BLD-MCNC: <10 MG/DL (ref 0–10)
ETHANOL UR QL: <0.01 %
FLUAV AG NPH QL: NEGATIVE
FLUBV AG NPH QL IA: NEGATIVE
GFR SERPL CREATININE-BSD FRML MDRD: 45 ML/MIN/1.73
GLOBULIN UR ELPH-MCNC: 3.7 GM/DL
GLUCOSE BLD-MCNC: 494 MG/DL (ref 65–99)
GLUCOSE BLDC GLUCOMTR-MCNC: 286 MG/DL (ref 70–130)
GLUCOSE BLDC GLUCOMTR-MCNC: 326 MG/DL (ref 70–130)
GLUCOSE UR STRIP-MCNC: ABNORMAL MG/DL
HCT VFR BLD AUTO: 39.8 % (ref 34–46.6)
HGB BLD-MCNC: 13.9 G/DL (ref 12–15.9)
HGB UR QL STRIP.AUTO: ABNORMAL
HYALINE CASTS UR QL AUTO: ABNORMAL /LPF
IMM GRANULOCYTES # BLD AUTO: 0.02 10*3/MM3 (ref 0–0.05)
IMM GRANULOCYTES NFR BLD AUTO: 0.1 % (ref 0–0.5)
KETONES UR QL STRIP: ABNORMAL
LEUKOCYTE ESTERASE UR QL STRIP.AUTO: NEGATIVE
LIPASE SERPL-CCNC: 11 U/L (ref 13–60)
LYMPHOCYTES # BLD AUTO: 1.47 10*3/MM3 (ref 0.7–3.1)
LYMPHOCYTES NFR BLD AUTO: 10.9 % (ref 19.6–45.3)
MAGNESIUM SERPL-MCNC: 1.9 MG/DL (ref 1.6–2.4)
MCH RBC QN AUTO: 29.7 PG (ref 26.6–33)
MCHC RBC AUTO-ENTMCNC: 34.9 G/DL (ref 31.5–35.7)
MCV RBC AUTO: 85 FL (ref 79–97)
METHADONE UR QL SCN: NEGATIVE
MONOCYTES # BLD AUTO: 0.85 10*3/MM3 (ref 0.1–0.9)
MONOCYTES NFR BLD AUTO: 6.3 % (ref 5–12)
NEUTROPHILS # BLD AUTO: 11.13 10*3/MM3 (ref 1.4–7)
NEUTROPHILS NFR BLD AUTO: 82.5 % (ref 42.7–76)
NITRITE UR QL STRIP: NEGATIVE
OPIATES UR QL: NEGATIVE
OXYCODONE UR QL SCN: NEGATIVE
PCP UR QL SCN: NEGATIVE
PH UR STRIP.AUTO: 6 [PH] (ref 5–8)
PLATELET # BLD AUTO: 205 10*3/MM3 (ref 140–450)
PMV BLD AUTO: 10.4 FL (ref 6–12)
POTASSIUM BLD-SCNC: 4.1 MMOL/L (ref 3.5–5.2)
PROT SERPL-MCNC: 7.9 G/DL (ref 6–8.5)
PROT UR QL STRIP: ABNORMAL
RBC # BLD AUTO: 4.68 10*6/MM3 (ref 3.77–5.28)
RBC # UR: ABNORMAL /HPF
REF LAB TEST METHOD: ABNORMAL
SODIUM BLD-SCNC: 135 MMOL/L (ref 136–145)
SP GR UR STRIP: 1.03 (ref 1–1.03)
SQUAMOUS #/AREA URNS HPF: ABNORMAL /HPF
UROBILINOGEN UR QL STRIP: ABNORMAL
WBC NRBC COR # BLD: 13.5 10*3/MM3 (ref 3.4–10.8)
WBC UR QL AUTO: ABNORMAL /HPF

## 2019-03-15 PROCEDURE — 80307 DRUG TEST PRSMV CHEM ANLYZR: CPT | Performed by: EMERGENCY MEDICINE

## 2019-03-15 PROCEDURE — 71045 X-RAY EXAM CHEST 1 VIEW: CPT | Performed by: RADIOLOGY

## 2019-03-15 PROCEDURE — 81001 URINALYSIS AUTO W/SCOPE: CPT | Performed by: EMERGENCY MEDICINE

## 2019-03-15 PROCEDURE — 99285 EMERGENCY DEPT VISIT HI MDM: CPT

## 2019-03-15 PROCEDURE — 87804 INFLUENZA ASSAY W/OPTIC: CPT | Performed by: EMERGENCY MEDICINE

## 2019-03-15 PROCEDURE — 85025 COMPLETE CBC W/AUTO DIFF WBC: CPT | Performed by: EMERGENCY MEDICINE

## 2019-03-15 PROCEDURE — 82962 GLUCOSE BLOOD TEST: CPT

## 2019-03-15 PROCEDURE — 83735 ASSAY OF MAGNESIUM: CPT | Performed by: EMERGENCY MEDICINE

## 2019-03-15 PROCEDURE — 25010000002 HALOPERIDOL LACTATE PER 5 MG: Performed by: EMERGENCY MEDICINE

## 2019-03-15 PROCEDURE — 25010000002 CEFTRIAXONE: Performed by: EMERGENCY MEDICINE

## 2019-03-15 PROCEDURE — 63710000001 INSULIN REGULAR HUMAN PER 5 UNITS: Performed by: EMERGENCY MEDICINE

## 2019-03-15 PROCEDURE — 93005 ELECTROCARDIOGRAM TRACING: CPT | Performed by: NURSE PRACTITIONER

## 2019-03-15 PROCEDURE — 25010000002 ONDANSETRON PER 1 MG: Performed by: EMERGENCY MEDICINE

## 2019-03-15 PROCEDURE — 71045 X-RAY EXAM CHEST 1 VIEW: CPT

## 2019-03-15 PROCEDURE — 80053 COMPREHEN METABOLIC PANEL: CPT | Performed by: EMERGENCY MEDICINE

## 2019-03-15 PROCEDURE — 36415 COLL VENOUS BLD VENIPUNCTURE: CPT

## 2019-03-15 PROCEDURE — 25010000002 PROMETHAZINE PER 50 MG: Performed by: EMERGENCY MEDICINE

## 2019-03-15 PROCEDURE — G0378 HOSPITAL OBSERVATION PER HR: HCPCS

## 2019-03-15 PROCEDURE — 83690 ASSAY OF LIPASE: CPT | Performed by: EMERGENCY MEDICINE

## 2019-03-15 RX ORDER — ONDANSETRON 2 MG/ML
4 INJECTION INTRAMUSCULAR; INTRAVENOUS ONCE
Status: COMPLETED | OUTPATIENT
Start: 2019-03-15 | End: 2019-03-15

## 2019-03-15 RX ORDER — IBUPROFEN 400 MG/1
800 TABLET ORAL ONCE
Status: COMPLETED | OUTPATIENT
Start: 2019-03-15 | End: 2019-03-15

## 2019-03-15 RX ORDER — GABAPENTIN 800 MG/1
800 TABLET ORAL 3 TIMES DAILY
Status: ON HOLD | COMMUNITY
End: 2019-08-01 | Stop reason: SDUPTHER

## 2019-03-15 RX ORDER — HALOPERIDOL 5 MG/ML
2 INJECTION INTRAMUSCULAR EVERY 4 HOURS PRN
Status: DISCONTINUED | OUTPATIENT
Start: 2019-03-15 | End: 2019-03-20 | Stop reason: HOSPADM

## 2019-03-15 RX ORDER — ONDANSETRON 4 MG/1
4 TABLET, FILM COATED ORAL EVERY 6 HOURS PRN
Status: DISCONTINUED | OUTPATIENT
Start: 2019-03-15 | End: 2019-03-17

## 2019-03-15 RX ORDER — SODIUM CHLORIDE 0.9 % (FLUSH) 0.9 %
3 SYRINGE (ML) INJECTION EVERY 12 HOURS SCHEDULED
Status: DISCONTINUED | OUTPATIENT
Start: 2019-03-16 | End: 2019-03-20 | Stop reason: HOSPADM

## 2019-03-15 RX ORDER — NITROFURANTOIN 25; 75 MG/1; MG/1
100 CAPSULE ORAL 2 TIMES DAILY
Qty: 14 CAPSULE | Refills: 0 | Status: SHIPPED | OUTPATIENT
Start: 2019-03-15 | End: 2019-03-20 | Stop reason: HOSPADM

## 2019-03-15 RX ORDER — NITROGLYCERIN 0.4 MG/1
0.4 TABLET SUBLINGUAL
Status: DISCONTINUED | OUTPATIENT
Start: 2019-03-15 | End: 2019-03-20 | Stop reason: HOSPADM

## 2019-03-15 RX ORDER — NICOTINE POLACRILEX 4 MG
15 LOZENGE BUCCAL
Status: DISCONTINUED | OUTPATIENT
Start: 2019-03-15 | End: 2019-03-20 | Stop reason: HOSPADM

## 2019-03-15 RX ORDER — ACETAMINOPHEN 325 MG/1
975 TABLET ORAL ONCE
Status: COMPLETED | OUTPATIENT
Start: 2019-03-15 | End: 2019-03-15

## 2019-03-15 RX ORDER — ONDANSETRON 4 MG/1
4 TABLET, ORALLY DISINTEGRATING ORAL EVERY 6 HOURS PRN
Status: DISCONTINUED | OUTPATIENT
Start: 2019-03-15 | End: 2019-03-17

## 2019-03-15 RX ORDER — ACETAMINOPHEN 650 MG/1
650 SUPPOSITORY RECTAL EVERY 4 HOURS PRN
Status: DISCONTINUED | OUTPATIENT
Start: 2019-03-15 | End: 2019-03-20 | Stop reason: HOSPADM

## 2019-03-15 RX ORDER — ACETAMINOPHEN 325 MG/1
650 TABLET ORAL EVERY 4 HOURS PRN
Status: DISCONTINUED | OUTPATIENT
Start: 2019-03-15 | End: 2019-03-20 | Stop reason: HOSPADM

## 2019-03-15 RX ORDER — ALUMINA, MAGNESIA, AND SIMETHICONE 2400; 2400; 240 MG/30ML; MG/30ML; MG/30ML
15 SUSPENSION ORAL EVERY 6 HOURS PRN
Status: DISCONTINUED | OUTPATIENT
Start: 2019-03-15 | End: 2019-03-20 | Stop reason: HOSPADM

## 2019-03-15 RX ORDER — HYDRALAZINE HYDROCHLORIDE 20 MG/ML
10 INJECTION INTRAMUSCULAR; INTRAVENOUS ONCE
Status: COMPLETED | OUTPATIENT
Start: 2019-03-16 | End: 2019-03-16

## 2019-03-15 RX ORDER — PROMETHAZINE HYDROCHLORIDE 25 MG/ML
12.5 INJECTION, SOLUTION INTRAMUSCULAR; INTRAVENOUS ONCE
Status: COMPLETED | OUTPATIENT
Start: 2019-03-15 | End: 2019-03-15

## 2019-03-15 RX ORDER — DEXTROSE MONOHYDRATE 25 G/50ML
25 INJECTION, SOLUTION INTRAVENOUS
Status: DISCONTINUED | OUTPATIENT
Start: 2019-03-15 | End: 2019-03-20 | Stop reason: HOSPADM

## 2019-03-15 RX ORDER — SODIUM CHLORIDE 0.9 % (FLUSH) 0.9 %
3-10 SYRINGE (ML) INJECTION AS NEEDED
Status: DISCONTINUED | OUTPATIENT
Start: 2019-03-15 | End: 2019-03-20 | Stop reason: HOSPADM

## 2019-03-15 RX ORDER — ONDANSETRON 2 MG/ML
4 INJECTION INTRAMUSCULAR; INTRAVENOUS EVERY 6 HOURS PRN
Status: DISCONTINUED | OUTPATIENT
Start: 2019-03-15 | End: 2019-03-17

## 2019-03-15 RX ADMIN — CEFTRIAXONE 1 G: 1 INJECTION, POWDER, FOR SOLUTION INTRAMUSCULAR; INTRAVENOUS at 20:22

## 2019-03-15 RX ADMIN — HUMAN INSULIN 8 UNITS: 100 INJECTION, SOLUTION SUBCUTANEOUS at 20:22

## 2019-03-15 RX ADMIN — IBUPROFEN 800 MG: 400 TABLET, FILM COATED ORAL at 20:59

## 2019-03-15 RX ADMIN — PROMETHAZINE HYDROCHLORIDE 12.5 MG: 25 INJECTION INTRAMUSCULAR; INTRAVENOUS at 19:58

## 2019-03-15 RX ADMIN — ACETAMINOPHEN 975 MG: 325 TABLET, FILM COATED ORAL at 19:46

## 2019-03-15 RX ADMIN — SODIUM CHLORIDE 1000 ML: 9 INJECTION, SOLUTION INTRAVENOUS at 21:52

## 2019-03-15 RX ADMIN — ONDANSETRON 4 MG: 2 INJECTION, SOLUTION INTRAMUSCULAR; INTRAVENOUS at 19:46

## 2019-03-15 RX ADMIN — HALOPERIDOL LACTATE 2 MG: 5 INJECTION, SOLUTION INTRAMUSCULAR at 23:00

## 2019-03-15 RX ADMIN — SODIUM CHLORIDE 1000 ML: 9 INJECTION, SOLUTION INTRAVENOUS at 19:46

## 2019-03-15 NOTE — ED NOTES
Pt states she is unable to provide a urine sample at this time     Savannah Rivera  03/15/19 1937

## 2019-03-16 ENCOUNTER — APPOINTMENT (OUTPATIENT)
Dept: CT IMAGING | Facility: HOSPITAL | Age: 59
End: 2019-03-16

## 2019-03-16 PROBLEM — R41.0 CONFUSION: Status: ACTIVE | Noted: 2019-03-16

## 2019-03-16 LAB
A-A DO2: 46.5 MMHG (ref 0–300)
AMMONIA BLD-SCNC: 30 UMOL/L (ref 11–51)
ANION GAP SERPL CALCULATED.3IONS-SCNC: 18.9 MMOL/L
ARTERIAL PATENCY WRIST A: ABNORMAL
ATMOSPHERIC PRESS: 727 MMHG
BASE EXCESS BLDA CALC-SCNC: -1.5 MMOL/L
BASOPHILS # BLD AUTO: 0.02 10*3/MM3 (ref 0–0.2)
BASOPHILS # BLD AUTO: 0.02 10*3/MM3 (ref 0–0.2)
BASOPHILS NFR BLD AUTO: 0.2 % (ref 0–1.5)
BASOPHILS NFR BLD AUTO: 0.2 % (ref 0–1.5)
BDY SITE: ABNORMAL
BODY TEMPERATURE: 98.6 C
BUN BLD-MCNC: 19 MG/DL (ref 8–23)
BUN/CREAT SERPL: 16.7 (ref 7–25)
CALCIUM SPEC-SCNC: 8.7 MG/DL (ref 8.6–10.5)
CHLORIDE SERPL-SCNC: 98 MMOL/L (ref 98–107)
CO2 SERPL-SCNC: 18.1 MMOL/L (ref 22–29)
COHGB MFR BLD: 0.8 % (ref 0–5)
CREAT BLD-MCNC: 1.14 MG/DL (ref 0.57–1)
CRP SERPL-MCNC: 0.62 MG/DL (ref 0–0.5)
D-LACTATE SERPL-SCNC: 2 MMOL/L (ref 0.5–2)
D-LACTATE SERPL-SCNC: 2.6 MMOL/L (ref 0.5–2)
DEPRECATED RDW RBC AUTO: 43 FL (ref 37–54)
DEPRECATED RDW RBC AUTO: 44 FL (ref 37–54)
EOSINOPHIL # BLD AUTO: 0 10*3/MM3 (ref 0–0.4)
EOSINOPHIL # BLD AUTO: 0 10*3/MM3 (ref 0–0.4)
EOSINOPHIL NFR BLD AUTO: 0 % (ref 0.3–6.2)
EOSINOPHIL NFR BLD AUTO: 0 % (ref 0.3–6.2)
ERYTHROCYTE [DISTWIDTH] IN BLOOD BY AUTOMATED COUNT: 13.9 % (ref 12.3–15.4)
ERYTHROCYTE [DISTWIDTH] IN BLOOD BY AUTOMATED COUNT: 14 % (ref 12.3–15.4)
GFR SERPL CREATININE-BSD FRML MDRD: 48 ML/MIN/1.73
GLUCOSE BLD-MCNC: 301 MG/DL (ref 65–99)
GLUCOSE BLDC GLUCOMTR-MCNC: 262 MG/DL (ref 70–130)
GLUCOSE BLDC GLUCOMTR-MCNC: 264 MG/DL (ref 70–130)
GLUCOSE BLDC GLUCOMTR-MCNC: 362 MG/DL (ref 70–130)
GLUCOSE BLDC GLUCOMTR-MCNC: 365 MG/DL (ref 70–130)
GLUCOSE BLDC GLUCOMTR-MCNC: 411 MG/DL (ref 70–130)
HBA1C MFR BLD: 10.3 % (ref 4.8–5.6)
HCO3 BLDA-SCNC: 22.5 MMOL/L (ref 22–26)
HCT VFR BLD AUTO: 40.5 % (ref 34–46.6)
HCT VFR BLD AUTO: 41.9 % (ref 34–46.6)
HCT VFR BLD CALC: 37 % (ref 37–47)
HGB BLD-MCNC: 13.8 G/DL (ref 12–15.9)
HGB BLD-MCNC: 13.9 G/DL (ref 12–15.9)
HGB BLDA-MCNC: 12.6 G/DL (ref 12–16)
HOLD SPECIMEN: NORMAL
HOROWITZ INDEX BLD+IHG-RTO: 21 %
IMM GRANULOCYTES # BLD AUTO: 0.02 10*3/MM3 (ref 0–0.05)
IMM GRANULOCYTES # BLD AUTO: 0.02 10*3/MM3 (ref 0–0.05)
IMM GRANULOCYTES NFR BLD AUTO: 0.2 % (ref 0–0.5)
IMM GRANULOCYTES NFR BLD AUTO: 0.2 % (ref 0–0.5)
LYMPHOCYTES # BLD AUTO: 1.06 10*3/MM3 (ref 0.7–3.1)
LYMPHOCYTES # BLD AUTO: 1.62 10*3/MM3 (ref 0.7–3.1)
LYMPHOCYTES NFR BLD AUTO: 13.7 % (ref 19.6–45.3)
LYMPHOCYTES NFR BLD AUTO: 9.5 % (ref 19.6–45.3)
MAGNESIUM SERPL-MCNC: 1.8 MG/DL (ref 1.6–2.4)
MCH RBC QN AUTO: 29 PG (ref 26.6–33)
MCH RBC QN AUTO: 29.6 PG (ref 26.6–33)
MCHC RBC AUTO-ENTMCNC: 33.2 G/DL (ref 31.5–35.7)
MCHC RBC AUTO-ENTMCNC: 34.1 G/DL (ref 31.5–35.7)
MCV RBC AUTO: 86.7 FL (ref 79–97)
MCV RBC AUTO: 87.5 FL (ref 79–97)
METHGB BLD QL: 0.4 % (ref 0–3)
MODALITY: ABNORMAL
MONOCYTES # BLD AUTO: 0.51 10*3/MM3 (ref 0.1–0.9)
MONOCYTES # BLD AUTO: 0.52 10*3/MM3 (ref 0.1–0.9)
MONOCYTES NFR BLD AUTO: 4.3 % (ref 5–12)
MONOCYTES NFR BLD AUTO: 4.6 % (ref 5–12)
NEUTROPHILS # BLD AUTO: 9.59 10*3/MM3 (ref 1.4–7)
NEUTROPHILS # BLD AUTO: 9.67 10*3/MM3 (ref 1.4–7)
NEUTROPHILS NFR BLD AUTO: 81.6 % (ref 42.7–76)
NEUTROPHILS NFR BLD AUTO: 85.5 % (ref 42.7–76)
OXYHGB MFR BLDV: 86.2 % (ref 85–100)
PCO2 BLDA: 35.6 MM HG (ref 35–45)
PH BLDA: 7.42 PH UNITS (ref 7.35–7.45)
PLATELET # BLD AUTO: 147 10*3/MM3 (ref 140–450)
PLATELET # BLD AUTO: 147 10*3/MM3 (ref 140–450)
PMV BLD AUTO: 10.2 FL (ref 6–12)
PMV BLD AUTO: 10.8 FL (ref 6–12)
PO2 BLDA: 53.7 MM HG (ref 80–100)
POTASSIUM BLD-SCNC: 3.8 MMOL/L (ref 3.5–5.2)
RBC # BLD AUTO: 4.67 10*6/MM3 (ref 3.77–5.28)
RBC # BLD AUTO: 4.79 10*6/MM3 (ref 3.77–5.28)
SAO2 % BLDCOA: 87.2 % (ref 90–100)
SODIUM BLD-SCNC: 135 MMOL/L (ref 136–145)
WBC NRBC COR # BLD: 11.21 10*3/MM3 (ref 3.4–10.8)
WBC NRBC COR # BLD: 11.84 10*3/MM3 (ref 3.4–10.8)

## 2019-03-16 PROCEDURE — 73700 CT LOWER EXTREMITY W/O DYE: CPT | Performed by: RADIOLOGY

## 2019-03-16 PROCEDURE — 25010000002 VANCOMYCIN 5 G RECONSTITUTED SOLUTION 5,000 MG VIAL: Performed by: NURSE PRACTITIONER

## 2019-03-16 PROCEDURE — 70450 CT HEAD/BRAIN W/O DYE: CPT | Performed by: RADIOLOGY

## 2019-03-16 PROCEDURE — 36600 WITHDRAWAL OF ARTERIAL BLOOD: CPT | Performed by: NURSE PRACTITIONER

## 2019-03-16 PROCEDURE — 86140 C-REACTIVE PROTEIN: CPT | Performed by: NURSE PRACTITIONER

## 2019-03-16 PROCEDURE — 85025 COMPLETE CBC W/AUTO DIFF WBC: CPT | Performed by: NURSE PRACTITIONER

## 2019-03-16 PROCEDURE — 70450 CT HEAD/BRAIN W/O DYE: CPT

## 2019-03-16 PROCEDURE — 63710000001 INSULIN ASPART PER 5 UNITS: Performed by: NURSE PRACTITIONER

## 2019-03-16 PROCEDURE — 83036 HEMOGLOBIN GLYCOSYLATED A1C: CPT | Performed by: NURSE PRACTITIONER

## 2019-03-16 PROCEDURE — 80048 BASIC METABOLIC PNL TOTAL CA: CPT | Performed by: NURSE PRACTITIONER

## 2019-03-16 PROCEDURE — 25010000002 ENOXAPARIN PER 10 MG: Performed by: NURSE PRACTITIONER

## 2019-03-16 PROCEDURE — 87070 CULTURE OTHR SPECIMN AEROBIC: CPT | Performed by: NURSE PRACTITIONER

## 2019-03-16 PROCEDURE — 63710000001 INSULIN DETEMIR PER 5 UNITS: Performed by: INTERNAL MEDICINE

## 2019-03-16 PROCEDURE — 87040 BLOOD CULTURE FOR BACTERIA: CPT | Performed by: NURSE PRACTITIONER

## 2019-03-16 PROCEDURE — 83050 HGB METHEMOGLOBIN QUAN: CPT | Performed by: NURSE PRACTITIONER

## 2019-03-16 PROCEDURE — 82962 GLUCOSE BLOOD TEST: CPT

## 2019-03-16 PROCEDURE — 83605 ASSAY OF LACTIC ACID: CPT | Performed by: NURSE PRACTITIONER

## 2019-03-16 PROCEDURE — 25010000002 CEFEPIME 2 G/NS 100 ML SOLUTION: Performed by: NURSE PRACTITIONER

## 2019-03-16 PROCEDURE — 73700 CT LOWER EXTREMITY W/O DYE: CPT

## 2019-03-16 PROCEDURE — 25010000002 HYDRALAZINE PER 20 MG: Performed by: NURSE PRACTITIONER

## 2019-03-16 PROCEDURE — 82805 BLOOD GASES W/O2 SATURATION: CPT | Performed by: NURSE PRACTITIONER

## 2019-03-16 PROCEDURE — G0378 HOSPITAL OBSERVATION PER HR: HCPCS

## 2019-03-16 PROCEDURE — 93010 ELECTROCARDIOGRAM REPORT: CPT | Performed by: INTERNAL MEDICINE

## 2019-03-16 PROCEDURE — 83735 ASSAY OF MAGNESIUM: CPT | Performed by: NURSE PRACTITIONER

## 2019-03-16 PROCEDURE — 87205 SMEAR GRAM STAIN: CPT | Performed by: NURSE PRACTITIONER

## 2019-03-16 PROCEDURE — 82375 ASSAY CARBOXYHB QUANT: CPT | Performed by: NURSE PRACTITIONER

## 2019-03-16 PROCEDURE — 82140 ASSAY OF AMMONIA: CPT | Performed by: NURSE PRACTITIONER

## 2019-03-16 RX ORDER — CLONIDINE HYDROCHLORIDE 0.1 MG/1
0.1 TABLET ORAL EVERY 6 HOURS PRN
Status: DISCONTINUED | OUTPATIENT
Start: 2019-03-16 | End: 2019-03-20 | Stop reason: HOSPADM

## 2019-03-16 RX ORDER — PROMETHAZINE HYDROCHLORIDE 25 MG/1
25 TABLET ORAL EVERY 6 HOURS PRN
Status: CANCELLED | OUTPATIENT
Start: 2019-03-16

## 2019-03-16 RX ORDER — GABAPENTIN 300 MG/1
600 CAPSULE ORAL 2 TIMES DAILY
Status: DISCONTINUED | OUTPATIENT
Start: 2019-03-16 | End: 2019-03-20 | Stop reason: HOSPADM

## 2019-03-16 RX ORDER — HYDROCODONE BITARTRATE AND ACETAMINOPHEN 10; 325 MG/1; MG/1
1 TABLET ORAL EVERY 6 HOURS PRN
Status: DISCONTINUED | OUTPATIENT
Start: 2019-03-16 | End: 2019-03-20 | Stop reason: HOSPADM

## 2019-03-16 RX ORDER — CLONIDINE HYDROCHLORIDE 0.1 MG/1
0.1 TABLET ORAL 2 TIMES DAILY
COMMUNITY
End: 2021-11-09 | Stop reason: HOSPADM

## 2019-03-16 RX ORDER — PANTOPRAZOLE SODIUM 40 MG/1
40 TABLET, DELAYED RELEASE ORAL EVERY MORNING
Status: DISCONTINUED | OUTPATIENT
Start: 2019-03-16 | End: 2019-03-17

## 2019-03-16 RX ORDER — TEMAZEPAM 15 MG/1
30 CAPSULE ORAL NIGHTLY
Status: DISCONTINUED | OUTPATIENT
Start: 2019-03-16 | End: 2019-03-20 | Stop reason: HOSPADM

## 2019-03-16 RX ORDER — HYDROCODONE BITARTRATE AND ACETAMINOPHEN 10; 325 MG/1; MG/1
1 TABLET ORAL EVERY 6 HOURS PRN
Status: ON HOLD | COMMUNITY
End: 2021-11-09 | Stop reason: SDUPTHER

## 2019-03-16 RX ORDER — MAGNESIUM SULFATE HEPTAHYDRATE 40 MG/ML
2 INJECTION, SOLUTION INTRAVENOUS AS NEEDED
Status: DISCONTINUED | OUTPATIENT
Start: 2019-03-16 | End: 2019-03-17

## 2019-03-16 RX ORDER — TIZANIDINE 4 MG/1
4 TABLET ORAL EVERY 6 HOURS PRN
Status: DISCONTINUED | OUTPATIENT
Start: 2019-03-16 | End: 2019-03-20 | Stop reason: HOSPADM

## 2019-03-16 RX ORDER — LORAZEPAM 2 MG/ML
0.5 INJECTION INTRAMUSCULAR EVERY 6 HOURS PRN
Status: DISCONTINUED | OUTPATIENT
Start: 2019-03-16 | End: 2019-03-20 | Stop reason: HOSPADM

## 2019-03-16 RX ORDER — HYDROXYZINE HYDROCHLORIDE 25 MG/1
25 TABLET, FILM COATED ORAL DAILY PRN
Status: DISCONTINUED | OUTPATIENT
Start: 2019-03-16 | End: 2019-03-17

## 2019-03-16 RX ORDER — ROPINIROLE 0.25 MG/1
0.5 TABLET, FILM COATED ORAL 2 TIMES DAILY
Status: DISCONTINUED | OUTPATIENT
Start: 2019-03-16 | End: 2019-03-20 | Stop reason: HOSPADM

## 2019-03-16 RX ORDER — POTASSIUM CHLORIDE 1.5 G/1.77G
40 POWDER, FOR SOLUTION ORAL AS NEEDED
Status: DISCONTINUED | OUTPATIENT
Start: 2019-03-16 | End: 2019-03-17

## 2019-03-16 RX ORDER — MAGNESIUM SULFATE HEPTAHYDRATE 40 MG/ML
4 INJECTION, SOLUTION INTRAVENOUS ONCE
Status: COMPLETED | OUTPATIENT
Start: 2019-03-16 | End: 2019-03-16

## 2019-03-16 RX ORDER — PROMETHAZINE HYDROCHLORIDE 25 MG/1
25 TABLET ORAL EVERY 6 HOURS PRN
COMMUNITY
End: 2019-03-20 | Stop reason: HOSPADM

## 2019-03-16 RX ORDER — FLUOXETINE HYDROCHLORIDE 20 MG/1
20 CAPSULE ORAL DAILY
Status: DISCONTINUED | OUTPATIENT
Start: 2019-03-16 | End: 2019-03-20 | Stop reason: HOSPADM

## 2019-03-16 RX ORDER — MAGNESIUM SULFATE HEPTAHYDRATE 40 MG/ML
4 INJECTION, SOLUTION INTRAVENOUS AS NEEDED
Status: DISCONTINUED | OUTPATIENT
Start: 2019-03-16 | End: 2019-03-17

## 2019-03-16 RX ORDER — SODIUM CHLORIDE 9 MG/ML
100 INJECTION, SOLUTION INTRAVENOUS CONTINUOUS
Status: DISCONTINUED | OUTPATIENT
Start: 2019-03-16 | End: 2019-03-18

## 2019-03-16 RX ORDER — ESCITALOPRAM OXALATE 10 MG/1
10 TABLET ORAL NIGHTLY
Status: DISCONTINUED | OUTPATIENT
Start: 2019-03-16 | End: 2019-03-17

## 2019-03-16 RX ORDER — ATORVASTATIN CALCIUM 10 MG/1
10 TABLET, FILM COATED ORAL DAILY
Status: DISCONTINUED | OUTPATIENT
Start: 2019-03-16 | End: 2019-03-17

## 2019-03-16 RX ORDER — POTASSIUM CHLORIDE 20 MEQ/1
40 TABLET, EXTENDED RELEASE ORAL AS NEEDED
Status: DISCONTINUED | OUTPATIENT
Start: 2019-03-16 | End: 2019-03-17

## 2019-03-16 RX ORDER — PRAVASTATIN SODIUM 20 MG
20 TABLET ORAL DAILY
COMMUNITY
End: 2019-03-20 | Stop reason: HOSPADM

## 2019-03-16 RX ORDER — POTASSIUM CHLORIDE 7.45 MG/ML
10 INJECTION INTRAVENOUS
Status: DISCONTINUED | OUTPATIENT
Start: 2019-03-16 | End: 2019-03-17

## 2019-03-16 RX ORDER — AMLODIPINE BESYLATE 10 MG/1
10 TABLET ORAL
Status: DISCONTINUED | OUTPATIENT
Start: 2019-03-16 | End: 2019-03-17

## 2019-03-16 RX ORDER — FLUDROCORTISONE ACETATE 0.1 MG/1
0.1 TABLET ORAL DAILY
Status: DISCONTINUED | OUTPATIENT
Start: 2019-03-16 | End: 2019-03-20 | Stop reason: HOSPADM

## 2019-03-16 RX ORDER — ATENOLOL 25 MG/1
25 TABLET ORAL 2 TIMES DAILY
Status: DISCONTINUED | OUTPATIENT
Start: 2019-03-16 | End: 2019-03-17

## 2019-03-16 RX ORDER — ROPINIROLE 0.5 MG/1
0.5 TABLET, FILM COATED ORAL 2 TIMES DAILY
COMMUNITY

## 2019-03-16 RX ADMIN — HYDRALAZINE HYDROCHLORIDE 10 MG: 20 INJECTION INTRAMUSCULAR; INTRAVENOUS at 00:14

## 2019-03-16 RX ADMIN — CEFEPIME 2 G: 2 INJECTION, POWDER, FOR SOLUTION INTRAVENOUS at 08:43

## 2019-03-16 RX ADMIN — AMLODIPINE BESYLATE 10 MG: 10 TABLET ORAL at 12:47

## 2019-03-16 RX ADMIN — ATORVASTATIN CALCIUM 10 MG: 10 TABLET, FILM COATED ORAL at 12:48

## 2019-03-16 RX ADMIN — INSULIN ASPART 3 UNITS: 100 INJECTION, SOLUTION INTRAVENOUS; SUBCUTANEOUS at 22:06

## 2019-03-16 RX ADMIN — SODIUM CHLORIDE 1000 ML: 9 INJECTION, SOLUTION INTRAVENOUS at 23:46

## 2019-03-16 RX ADMIN — AZTREONAM 2 G: 2 INJECTION, POWDER, FOR SOLUTION INTRAMUSCULAR; INTRAVENOUS at 03:09

## 2019-03-16 RX ADMIN — INSULIN DETEMIR 25 UNITS: 100 INJECTION, SOLUTION SUBCUTANEOUS at 14:16

## 2019-03-16 RX ADMIN — PANTOPRAZOLE SODIUM 40 MG: 40 TABLET, DELAYED RELEASE ORAL at 12:47

## 2019-03-16 RX ADMIN — SODIUM CHLORIDE 100 ML/HR: 9 INJECTION, SOLUTION INTRAVENOUS at 10:48

## 2019-03-16 RX ADMIN — SODIUM CHLORIDE, PRESERVATIVE FREE 3 ML: 5 INJECTION INTRAVENOUS at 08:45

## 2019-03-16 RX ADMIN — INSULIN ASPART 6 UNITS: 100 INJECTION, SOLUTION INTRAVENOUS; SUBCUTANEOUS at 17:15

## 2019-03-16 RX ADMIN — ROPINIROLE HYDROCHLORIDE 0.5 MG: 0.25 TABLET, FILM COATED ORAL at 12:48

## 2019-03-16 RX ADMIN — ATENOLOL 25 MG: 25 TABLET ORAL at 12:47

## 2019-03-16 RX ADMIN — VANCOMYCIN HYDROCHLORIDE 1000 MG: 5 INJECTION, POWDER, LYOPHILIZED, FOR SOLUTION INTRAVENOUS at 03:37

## 2019-03-16 RX ADMIN — INSULIN ASPART 3 UNITS: 100 INJECTION, SOLUTION INTRAVENOUS; SUBCUTANEOUS at 00:15

## 2019-03-16 RX ADMIN — SODIUM CHLORIDE, PRESERVATIVE FREE 3 ML: 5 INJECTION INTRAVENOUS at 00:16

## 2019-03-16 RX ADMIN — ENOXAPARIN SODIUM 40 MG: 40 INJECTION SUBCUTANEOUS at 08:44

## 2019-03-16 RX ADMIN — SODIUM CHLORIDE 500 ML: 9 INJECTION, SOLUTION INTRAVENOUS at 19:48

## 2019-03-16 RX ADMIN — GABAPENTIN 600 MG: 300 CAPSULE ORAL at 12:47

## 2019-03-16 RX ADMIN — INSULIN DETEMIR 25 UNITS: 100 INJECTION, SOLUTION SUBCUTANEOUS at 22:06

## 2019-03-16 RX ADMIN — FLUOXETINE 20 MG: 20 CAPSULE ORAL at 12:48

## 2019-03-16 RX ADMIN — MAGNESIUM SULFATE HEPTAHYDRATE 4 G: 40 INJECTION, SOLUTION INTRAVENOUS at 08:43

## 2019-03-16 RX ADMIN — INSULIN ASPART 7 UNITS: 100 INJECTION, SOLUTION INTRAVENOUS; SUBCUTANEOUS at 08:44

## 2019-03-16 RX ADMIN — SODIUM CHLORIDE 100 ML/HR: 9 INJECTION, SOLUTION INTRAVENOUS at 19:48

## 2019-03-16 RX ADMIN — INSULIN ASPART 6 UNITS: 100 INJECTION, SOLUTION INTRAVENOUS; SUBCUTANEOUS at 11:27

## 2019-03-16 RX ADMIN — CEFEPIME 2 G: 2 INJECTION, POWDER, FOR SOLUTION INTRAVENOUS at 22:06

## 2019-03-16 RX ADMIN — ACETAMINOPHEN 650 MG: 650 SUPPOSITORY RECTAL at 00:15

## 2019-03-16 NOTE — PROGRESS NOTES
Discharge Planning Assessment   [facility]     Patient Name: Reny Elena  MRN: [id number]  Today's Date: 3/16/2019    Admit Date: 3/15/2019    Discharge Needs Assessment     Row Name 03/16/19 1404       Living Environment    Lives With  alone    Current Living Arrangements  home/apartment/condo    Primary Care Provided by  self    Provides Primary Care For  no one    Family Caregiver if Needed  none    Quality of Family Relationships  supportive;helpful;involved    Able to Return to Prior Arrangements  yes       Resource/Environmental Concerns    Resource/Environmental Concerns  none    Transportation Concerns  car, none       Transition Planning    Patient/Family Anticipates Transition to  home    Patient/Family Anticipated Services at Transition  home health care [facility]     Transportation Anticipated  family or friend will provide       Discharge Needs Assessment    Readmission Within the Last 30 Days  no previous admission in last 30 days    Concerns to be Addressed  denies needs/concerns at this time    Equipment Currently Used at Home  commode;wheelchair;glucometer;shower chair    Anticipated Changes Related to Illness  none    Equipment Needed After Discharge  glucometer;other (see comments) Patient needs a new glucometer and B/P cuff and her insurance is already sending these items to her.     Current Discharge Risk  lives alone        Discharge Plan     Row Name 03/16/19 3909       Plan    Plan  Patient is independent and lives at home alone where she plans to return at discharge.  Patient's PCP is Dr. Paulson and she uses [facility] for her pharmacy needs.  Patient denies any insurance or financial needs at this time.  Patient has a wheelchair, glucometer, walker and BSC that she uses at home and she utilizes [facility].  No other issues or concerns noted at this time. SS consult has been made and they will continue to follow and assist with any discharge needs.      Patient/Family in  Agreement with Plan  yes        Destination      No service coordination in this encounter.      Durable Medical Equipment      No service coordination in this encounter.      Dialysis/Infusion      No service coordination in this encounter.      Home Medical Care      No service coordination in this encounter.      Community Resources      No service coordination in this encounter.          Demographic Summary     Row Name 03/16/19 1403       General Information    Admission Type  observation    Arrived From  home;emergency department    Referral Source  admission list    Reason for Consult  discharge planning    Preferred Language  English     Used During This Interaction  no        Functional Status     Row Name 03/16/19 1404       Functional Status    Functional Status Comments  Independent        Functional Status, IADL    IADL Comments  Independent        Mental Status    General Appearance WDL  WDL       Mental Status Summary    Recent Changes in Mental Status/Cognitive Functioning  no changes       Employment/    Employment Status  disabled        Psychosocial    No documentation.       Abuse/Neglect    No documentation.       Legal    No documentation.       Substance Abuse    No documentation.       Patient Forms    No documentation.           Kianna Turner RN

## 2019-03-16 NOTE — PLAN OF CARE
Problem: Fall Risk (Adult)  Goal: Identify Related Risk Factors and Signs and Symptoms  Outcome: Ongoing (interventions implemented as appropriate)    Goal: Absence of Fall  Outcome: Ongoing (interventions implemented as appropriate)      Problem: Patient Care Overview  Goal: Plan of Care Review  Outcome: Ongoing (interventions implemented as appropriate)    Goal: Individualization and Mutuality  Outcome: Ongoing (interventions implemented as appropriate)    Goal: Discharge Needs Assessment  Outcome: Ongoing (interventions implemented as appropriate)    Goal: Interprofessional Rounds/Family Conf  Outcome: Ongoing (interventions implemented as appropriate)      Problem: Skin Injury Risk (Adult)  Goal: Identify Related Risk Factors and Signs and Symptoms  Outcome: Ongoing (interventions implemented as appropriate)    Goal: Skin Health and Integrity  Outcome: Ongoing (interventions implemented as appropriate)      Problem: Diabetes, Type 2 (Adult)  Goal: Signs and Symptoms of Listed Potential Problems Will be Absent, Minimized or Managed (Diabetes, Type 2)  Outcome: Ongoing (interventions implemented as appropriate)

## 2019-03-16 NOTE — ED NOTES
Spoke with pt's daughter Liza Oliva, she says herself or someone else will be here to  her mother shortly.     Kianna Salcedo, GLORIA  03/15/19 2046

## 2019-03-16 NOTE — PLAN OF CARE
Problem: Fall Risk (Adult)  Goal: Identify Related Risk Factors and Signs and Symptoms  Outcome: Ongoing (interventions implemented as appropriate)   03/15/19 2345   Fall Risk (Adult)   Related Risk Factors (Fall Risk) confusion/agitation;age-related changes;sleep pattern alteration;slippery/uneven surfaces;environment unfamiliar   Signs and Symptoms (Fall Risk) presence of risk factors       Problem: Patient Care Overview  Goal: Plan of Care Review  Outcome: Ongoing (interventions implemented as appropriate)   03/16/19 0762   Coping/Psychosocial   Plan of Care Reviewed With patient       Problem: Skin Injury Risk (Adult)  Goal: Identify Related Risk Factors and Signs and Symptoms  Outcome: Ongoing (interventions implemented as appropriate)   03/15/19 2024   Skin Injury Risk (Adult)   Related Risk Factors (Skin Injury Risk) advanced age;cognitive impairment       Problem: Diabetes, Type 2 (Adult)  Goal: Signs and Symptoms of Listed Potential Problems Will be Absent, Minimized or Managed (Diabetes, Type 2)  Outcome: Ongoing (interventions implemented as appropriate)   03/15/19 2345   Goal/Outcome Evaluation   Problems Assessed (Type 2 Diabetes) all   Problems Present (Type 2 Diabetes) hyperglycemia

## 2019-03-16 NOTE — ED NOTES
Shania Justin from [facility] is on the line with Dr. Perla at this time.      Curtis Soler  03/15/19 7880

## 2019-03-16 NOTE — ED PROVIDER NOTES
Subjective   Patient presents to ER with nausea, vomiting and fever.        Vomiting   The primary symptoms include fever, nausea, vomiting and dysuria.   The illness is also significant for chills and back pain.       Review of Systems   Constitutional: Positive for chills and fever.   HENT: Negative.    Eyes: Negative.    Respiratory: Negative.    Cardiovascular: Negative.    Gastrointestinal: Positive for nausea and vomiting.   Endocrine: Positive for polydipsia and polyuria.   Genitourinary: Positive for difficulty urinating and dysuria.   Musculoskeletal: Positive for back pain.   Allergic/Immunologic: Negative.    Neurological: Negative.    Hematological: Negative.    Psychiatric/Behavioral: Negative.        Past Medical History:   Diagnosis Date   • Arthritis    • Depression    • Essential hypertension    • Hyperlipidemia    • PAD (peripheral artery disease) (CMS/[facility])    • Type 2 diabetes mellitus (CMS/[facility])        Allergies   Allergen Reactions   • Latex      Added based on information entered during case entry, please review and add reactions, type, and severity as needed   • Morphine And Related Confusion   • Penicillins    • Codeine Rash     Pt tolerates Norco @ home   • Sulfa Antibiotics Rash       Past Surgical History:   Procedure Laterality Date   • BACK SURGERY      Post spinal diskectomy, osteophytectomy in one lumbar interspace   • CATARACT EXTRACTION      Left    •  SECTION     • DENTAL PROCEDURE     • HYSTERECTOMY     • INCISION AND DRAINAGE LEG Left 4/15/2017    Procedure: INCISION AND DRAINAGE LOWER EXTREMITY;  Surgeon: Basilio Morris MD;  Location: [facility];  Service:    • INCISION AND DRAINAGE LEG Left 2017    Procedure: Irrigation and Debridment abcess diabetic wound left foot with deep culture;  Surgeon: Basilio Morris MD;  Location: [facility];  Service:    • KNEE ARTHROSCOPY Right    • ORIF TIBIA FRACTURE Right 2018    Procedure: TIBIAL  FRACTURE OPEN REDUCTION  INTERNAL FIXATION;  Surgeon: Jung Barragan MD;  Location: [facility];  Service: Orthopedics   • TOE AMPUTATION Right     5th   • TUBAL ABDOMINAL LIGATION         Family History   Problem Relation Age of Onset   • Heart disease Mother    • Cancer Mother    • COPD Mother    • Diabetes Other    • Depression Other        Social History     Socioeconomic History   • Marital status:      Spouse name: Not on file   • Number of children: Not on file   • Years of education: Not on file   • Highest education level: Not on file   Tobacco Use   • Smoking status: Never Smoker   Substance and Sexual Activity   • Alcohol use: No   • Drug use: No   • Sexual activity: Defer           Objective   Physical Exam   Constitutional: She appears well-developed and well-nourished.   HENT:   Head: Normocephalic and atraumatic.   Eyes: Pupils are equal, round, and reactive to light.   Neck: Normal range of motion.   Cardiovascular: Normal rate.   Pulmonary/Chest: Effort normal.   Abdominal: There is tenderness.   Musculoskeletal: Normal range of motion.   Neurological:   Oriented to person, place and time   Skin: Skin is warm.   Psychiatric: She has a normal mood and affect.   Nursing note and vitals reviewed.      Procedures           ED Course                  MDM      Final diagnoses:   Hyperglycemia   Hematuria, microscopic            González Perla MD  03/15/19 2027       González Perla MD  03/15/19 3724

## 2019-03-16 NOTE — PROGRESS NOTES
Pharmacy to dose Vancomycin for sepsis.  Wt = 53.1kg  CrCl = 43.4.  Dosed as follows:  Vancomycin 1000mg x 1 then 750mg iv q24h.  Pharmacy will monitor and adjust dosing as needed.     Rober Mckenzie RPH  4:13 AM  03/16/1

## 2019-03-16 NOTE — PLAN OF CARE
Problem: Fall Risk (Adult)  Goal: Identify Related Risk Factors and Signs and Symptoms  Outcome: Ongoing (interventions implemented as appropriate)    Goal: Absence of Fall  Outcome: Ongoing (interventions implemented as appropriate)      Problem: Patient Care Overview  Goal: Plan of Care Review  Outcome: Ongoing (interventions implemented as appropriate)    Goal: Individualization and Mutuality  Outcome: Ongoing (interventions implemented as appropriate)    Goal: Interprofessional Rounds/Family Conf  Outcome: Ongoing (interventions implemented as appropriate)      Problem: Skin Injury Risk (Adult)  Goal: Identify Related Risk Factors and Signs and Symptoms  Outcome: Ongoing (interventions implemented as appropriate)    Goal: Skin Health and Integrity  Outcome: Ongoing (interventions implemented as appropriate)      Problem: Diabetes, Type 2 (Adult)  Goal: Signs and Symptoms of Listed Potential Problems Will be Absent, Minimized or Managed (Diabetes, Type 2)  Outcome: Ongoing (interventions implemented as appropriate)

## 2019-03-16 NOTE — H&P
HISTORY AND PHYSICAL    Patient Identification:  Name:  Reny Elena  Age:  59 y.o.  Sex:  female  :  1960  MRN:  [id number]   Visit Number:  [id number]  Primary Care Physician:  Yandel Paulson MD       Subjective     Subjective     Chief complaint:     Chief Complaint   Patient presents with   • Vomiting   • Nausea   • Fever       History of presenting illness:     According to ER report, patient was brought into the ER for nausea, vomiting and fever.  Patient was admitted to the observation unit for further evaluation monitoring.  Upon my evaluation, patient was very confused and was unable to answer any questions.    Blood glucose in the ED is 494 which was treated, hyponatremia at 135, lipase 11, magnesium 1.9, white blood count 13.50, influenza negative, UA not concerning for UTI, urine tox screen negative, chest x-ray pending.  ---------------------------------------------------------------------------------------------------------------------  Review of Systems   Unable to perform ROS: Mental status change     ---------------------------------------------------------------------------------------------------------------------     Past Medical History    Past Medical History:   Diagnosis Date   • Arthritis    • Depression    • Essential hypertension    • Hyperlipidemia    • PAD (peripheral artery disease) (CMS/HCC)    • Type 2 diabetes mellitus (CMS/[facility])        Past Surgical History    Past Surgical History:   Procedure Laterality Date   • BACK SURGERY      Post spinal diskectomy, osteophytectomy in one lumbar interspace   • CATARACT EXTRACTION      Left    •  SECTION     • DENTAL PROCEDURE     • HYSTERECTOMY     • INCISION AND DRAINAGE LEG Left 4/15/2017    Procedure: INCISION AND DRAINAGE LOWER EXTREMITY;  Surgeon: Basilio Morris MD;  Location: [facility];  Service:    • INCISION AND DRAINAGE LEG Left 2017    Procedure: Irrigation and Debridment abcess diabetic wound left  foot with deep culture;  Surgeon: Basilio Morris MD;  Location: [facility];  Service:    • KNEE ARTHROSCOPY Right    • ORIF TIBIA FRACTURE Right 12/28/2018    Procedure: TIBIAL  FRACTURE OPEN REDUCTION INTERNAL FIXATION;  Surgeon: Jung Barragan MD;  Location: [facility];  Service: Orthopedics   • TOE AMPUTATION Right     5th   • TUBAL ABDOMINAL LIGATION         Family History    Family History   Problem Relation Age of Onset   • Heart disease Mother    • Cancer Mother    • COPD Mother    • Diabetes Other    • Depression Other        Social History    Social History     Tobacco Use   • Smoking status: Never Smoker   Substance Use Topics   • Alcohol use: No   • Drug use: No       Allergies    Latex; Morphine and related; Penicillins; Codeine; and Sulfa antibiotics  ---------------------------------------------------------------------------------------------------------------------     Home Medications:    Prior to Admission Medications     Prescriptions Last Dose Informant Patient Reported? Taking?    gabapentin (NEURONTIN) 800 MG tablet  CECI Yes Yes    Take 800 mg by mouth 3 (Three) Times a Day.    amLODIPine (NORVASC) 10 MG tablet  Self No No    Take 1 tablet by mouth Daily.    atenolol (TENORMIN) 25 MG tablet  Self Yes No    Take 25 mg by mouth 2 (Two) Times a Day.    escitalopram (LEXAPRO) 10 MG tablet  Self Yes No    Take 10 mg by mouth Every Night.    fludrocortisone 0.1 MG tablet  Self Yes No    Take 0.1 mg by mouth Daily.    FLUoxetine (PROzac) 20 MG capsule   Yes No    Take 20 mg by mouth.    HYDROcodone-acetaminophen (NORCO) 7.5-325 MG per tablet   No No    Take 1 tablet by mouth Every 4 (Four) Hours As Needed for Moderate Pain .    hydrOXYzine (ATARAX) 25 MG tablet  Self Yes No    Take 25 mg by mouth 3 (Three) Times a Day As Needed (coughing).    insulin aspart (novoLOG) 100 UNIT/ML injection   No No    Sliding scale TID with meals. Glucose 150-199:2 units. 240-249: 3 units. 250-299- 4 units. 300-349- 5  units. 350-400 :6 units. Over 400: 7un    Insulin Glargine (BASAGLAR KWIKPEN) 100 UNIT/ML injection pen  Self No No    Inject 25 Units under the skin into the appropriate area as directed 2 (Two) Times a Day.    iron polysaccharides (NIFEREX) 150 MG capsule   No No    Take 1 capsule by mouth Daily.    pantoprazole (PROTONIX) 40 MG EC tablet  Self Yes No    Take 40 mg by mouth Daily.    sennosides-docusate sodium (SENOKOT-S) 8.6-50 MG tablet  Self No No    Take 1 tablet by mouth 2 (Two) Times a Day.    Patient taking differently:  Take 1 tablet by mouth 2 (Two) Times a Day As Needed.    temazepam (RESTORIL) 30 MG capsule  Self Yes No    Take 30 mg by mouth Every Night.    tiZANidine (ZANAFLEX) 4 MG tablet  Self Yes No    Take 4 mg by mouth Every 6 (Six) Hours As Needed for Muscle Spasms.    vitamin D (ERGOCALCIFEROL) 79859 units capsule capsule  Self Yes No    Take 50,000 Units by mouth 1 (One) Time Per Week. Prior to [employer] Admission, Patient was on: takes on wednesdays        ---------------------------------------------------------------------------------------------------------------------    Objective     Objective     Hospital Scheduled Meds:    aztreonam 2 g Intravenous Q8H   enoxaparin 40 mg Subcutaneous Q24H   insulin aspart 0-7 Units Subcutaneous 4x Daily AC & at Bedtime   sodium chloride 3 mL Intravenous Q12H   [START ON 3/17/2019] vancomycin 15 mg/kg Intravenous Q24H       Pharmacy to dose vancomycin      ---------------------------------------------------------------------------------------------------------------------   Vital Signs:  Temp:  [99 °F (37.2 °C)-103.3 °F (39.6 °C)] 99.8 °F (37.7 °C)  Heart Rate:  [] 96  Resp:  [17-20] 20  BP: (136-173)/() 136/78  Mean Arterial Pressure (Non-Invasive) for the past 24 hrs (Last 3 readings):   Noninvasive MAP (mmHg)   03/16/19 0502 97   03/16/19 0100 105   03/15/19 2337 108     SpO2 Percentage    03/15/19 2048 03/15/19 2053 03/16/19 0502   SpO2:  (!) 80% 96% 98%     SpO2:  [80 %-98 %] 98 %  on  Flow (L/min):  [2] 2;   Device (Oxygen Therapy): nasal cannula    Body mass index is 18.33 kg/m².  Wt Readings from Last 3 Encounters:   03/15/19 53.1 kg (117 lb 1 oz)   02/14/19 56.7 kg (125 lb)   12/30/18 73.3 kg (161 lb 9.6 oz)     ---------------------------------------------------------------------------------------------------------------------     Physical Exam:    Constitutional:  Well-developed and well-nourished.  No respiratory distress.      HENT:  Head: Normocephalic and atraumatic.  Mouth:  Moist mucous membranes.    Eyes:  Conjunctivae and EOM are normal.  No scleral icterus.  Neck:  Neck supple.  No JVD present.    Cardiovascular:  Normal rate, regular rhythm and normal heart sounds with no murmur. No edema.  Pulmonary/Chest:  No respiratory distress, no wheezes, no crackles, with normal breath sounds and good air movement.  Abdominal:  Soft.  Bowel sounds are normal.  No distension and no tenderness.   Musculoskeletal:  No edema, no tenderness, and no deformity.  No swelling or redness of joints.  Neurological: Confused  Skin:  Skin is warm and dry.  No rash noted.  No pallor.   Psychiatric:  Normal mood and affect.  Behavior is normal.    ---------------------------------------------------------------------------------------------------------------------  I have personally reviewed the EKG/Telemetry strip  ---------------------------------------------------------------------------------------------------------------------           Results from last 7 days   Lab Units 03/16/19  0018   PH, ARTERIAL pH units 7.419   PO2 ART mm Hg 53.7*   PCO2, ARTERIAL mm Hg 35.6   HCO3 ART mmol/L 22.5     Results from last 7 days   Lab Units 03/16/19  0334 03/16/19  0053 03/15/19  1928   LACTATE mmol/L  --  2.6*  --    WBC 10*3/mm3 11.84* 11.21* 13.50*   HEMOGLOBIN g/dL 13.9 13.8 13.9   HEMATOCRIT % 41.9 40.5 39.8   MCV fL 87.5 86.7 85.0   MCHC g/dL 33.2 34.1 34.9    PLATELETS 10*3/mm3 147 147 205     Results from last 7 days   Lab Units 03/16/19  0053 03/15/19  1928   SODIUM mmol/L 135* 135*   POTASSIUM mmol/L 3.8 4.1   MAGNESIUM mg/dL 1.8 1.9   CHLORIDE mmol/L 98 93*   CO2 mmol/L 18.1* 23.7   BUN mg/dL 19 19   CREATININE mg/dL 1.14* 1.22*   EGFR IF NONAFRICN AM mL/min/1.73 48* 45*   CALCIUM mg/dL 8.7 9.5   GLUCOSE mg/dL 301* 494*   ALBUMIN g/dL  --  4.18   BILIRUBIN mg/dL  --  0.6   ALK PHOS U/L  --  159*   AST (SGOT) U/L  --  18   ALT (SGPT) U/L  --  10   Estimated Creatinine Clearance: 43.4 mL/min (A) (by C-G formula based on SCr of 1.14 mg/dL (H)).  Ammonia   Date Value Ref Range Status   03/16/2019 30 11 - 51 umol/L Final       Glucose   Date/Time Value Ref Range Status   03/15/2019 2334 286 (H) 70 - 130 mg/dL Final   03/15/2019 2229 326 (H) 70 - 130 mg/dL Final     Lab Results   Component Value Date    HGBA1C 9.40 (H) 12/29/2018     Lab Results   Component Value Date    TSH 1.413 12/29/2018    FREET4 1.10 12/29/2018                      Pain Management Panel     Pain Management Panel Latest Ref Rng & Units 3/15/2019 11/21/2018    CREATININE UR mg/dL - -    AMPHETAMINES SCREEN, URINE Negative Negative Negative    BARBITURATES SCREEN Negative Negative Negative    BENZODIAZEPINE SCREEN, URINE Negative Negative Positive(A)    BUPRENORPHINE Negative Negative Negative    COCAINE SCREEN, URINE Negative Negative Negative    METHADONE SCREEN, URINE Negative Negative Negative        I have personally reviewed the above laboratory results.   ---------------------------------------------------------------------------------------------------------------------  Imaging Results (last 7 days)     Procedure Component Value Units Date/Time    CT Head Without Contrast [[id number]] Updated:  03/16/19 0125    XR Chest 1 View [[id number]] Updated:  03/15/19 1928        I have personally reviewed the above radiology results.    ---------------------------------------------------------------------------------------------------------------------      Assessment & Plan        Assessment/Plan       ASSESSMENT:    1.  Hyperglycemia  2.  Confusion    PLAN:    According to ER report, patient was brought into the ER for nausea, vomiting and fever.  Patient was admitted to the observation unit for further evaluation monitoring.  Upon my evaluation, patient was very confused and was unable to answer any questions.    Blood glucose in the ED is 494 which was treated, hyponatremia at 135, lipase 11, magnesium 1.9, white blood count 13.50, influenza negative, UA not concerning for UTI, urine tox screen negative, chest x-ray pending.    Due to patient's confusion, CT of head ordered as well as ammonia level, ABG, lactic acid, CBC, mag, and EKG.    Patient's temp 102.8 upon arrival to the observation unit, Tylenol given, will continue to monitor.    O2 order to maintain sats greater than 90%.    Patient very confused trying to get out of bed, and pulling at lines and appears very anxious 0.5 mg Ativan IV given.    Patient had positive sepsis screen, sepsis bundle ordered.    Azactam 2 g IV every 8 hours and vancomycin per pharmacy to dose given for sepsis.    Sitter was ordered for bedside    Lovenox 40 mg subcutaneous every 24 hours ordered for VT E prophylaxis.    We will repeat labs in a.m.    Patient's findings and recommendations were discussed with patient and nursing staff      Code Status:   Code Status and Medical Interventions:   Ordered at: 03/15/19 5217     Level Of Support Discussed With:    Patient     Code Status:    CPR     Medical Interventions (Level of Support Prior to Arrest):    Full           Shania Justin, APRN  03/16/19  6:14 AM

## 2019-03-16 NOTE — PROGRESS NOTES
PROGRESS NOTE         Patient Identification:  Name:  Reny Elena  Age:  59 y.o.  Sex:  female  :  1960  MRN:  [id number]  Visit Number:  [id number]  Primary Care Provider:  Yandel Paulson MD      ----------------------------------------------------------------------------------------------------------------------  Subjective       Chief Complaints:    Vomiting; Nausea; and Fever        Interval History:      Patient confused this morning but comfortable. No complaints. Right leg wrapped, CT ordered to rule out cellulitis or abscess. WBC 11.84. Lactic acid normalized. Hemoglobin A1C 10.30.  CRP 0.62. CT of head negative. Blood cultures and wound cultures in progress. Chest x-ray unremarkable.     Review of Systems:    Unable to obtain. Confused.  ----------------------------------------------------------------------------------------------------------------------      Objective       Current [facility] Meds:    amLODIPine 10 mg Oral Q24H   atenolol 25 mg Oral BID   atorvastatin 10 mg Oral Daily   cefepime 2 g Intravenous Q12H   [START ON 3/20/2019] cholecalciferol 50,000 Units Oral Weekly   enoxaparin 40 mg Subcutaneous Q24H   escitalopram 10 mg Oral Nightly   fludrocortisone 0.1 mg Oral Daily   FLUoxetine 20 mg Oral Daily   gabapentin 600 mg Oral BID   insulin aspart 0-7 Units Subcutaneous 4x Daily AC & at Bedtime   insulin detemir 25 Units Subcutaneous Q12H   pantoprazole 40 mg Oral QAM   rOPINIRole 0.5 mg Oral BID   sodium chloride 3 mL Intravenous Q12H   temazepam 30 mg Oral Nightly   [START ON 3/17/2019] vancomycin 15 mg/kg Intravenous Q24H       sodium chloride 100 mL/hr Last Rate: 100 mL/hr (19 1358)     ----------------------------------------------------------------------------------------------------------------------    Vital Signs:  Temp:  [97.5 °F (36.4 °C)-103.3 °F (39.6 °C)] 98.8 °F (37.1 °C)  Heart Rate:  [] 102  Resp:  [17-20] 20  BP: (115-173)/()  115/57  Mean Arterial Pressure (Non-Invasive) for the past 24 hrs (Last 3 readings):   Noninvasive MAP (mmHg)   03/16/19 1231 75   03/16/19 0740 106   03/16/19 0502 97     SpO2 Percentage    03/16/19 0502 03/16/19 0740 03/16/19 1231   SpO2: 98% 97% 95%     SpO2:  [80 %-98 %] 95 %  on  Flow (L/min):  [2] 2;   Device (Oxygen Therapy): nasal cannula    Body mass index is 18.33 kg/m².  Wt Readings from Last 3 Encounters:   03/15/19 53.1 kg (117 lb 1 oz)   02/14/19 56.7 kg (125 lb)   12/30/18 73.3 kg (161 lb 9.6 oz)        Intake/Output Summary (Last 24 hours) at 3/16/2019 1433  Last data filed at 3/16/2019 1231  Gross per 24 hour   Intake 2218 ml   Output --   Net 2218 ml     Diet Regular; Consistent Carbohydrate  ----------------------------------------------------------------------------------------------------------------------    Physical exam:    Constitutional:  Well-developed and well-nourished.  No respiratory distress.      HENT:  Head:  Normocephalic and atraumatic.  Mouth:  Moist mucous membranes.    Eyes:  Conjunctivae and EOM are normal.  No scleral icterus.    Neck:  Neck supple.  No JVD present.    Cardiovascular:  Normal rate, regular rhythm and normal heart sounds with no murmur. No edema.  Pulmonary/Chest:  No respiratory distress, no wheezes, no crackles, with normal breath sounds and good air movement.  Abdominal:  Soft.  Bowel sounds are normal.  No distension and no tenderness.   Musculoskeletal:  RLE in ace wrap from Tib/Fib surgery in December, redness noted.   Neurological:  Confused.  No slurred speech.   Skin:  Skin is warm and dry. No rash noted. No pallor.     ----------------------------------------------------------------------------------------------------------------------  I have personally reviewed the EKG/Telemetry strips   ----------------------------------------------------------------------------------------------------------------------          Results from last 7 days   Lab  Units 03/16/19  0018   PH, ARTERIAL pH units 7.419   PO2 ART mm Hg 53.7*   PCO2, ARTERIAL mm Hg 35.6   HCO3 ART mmol/L 22.5     Results from last 7 days   Lab Units 03/16/19  0551 03/16/19  0334 03/16/19  0053 03/15/19  1928   CRP mg/dL  --   --  0.62*  --    LACTATE mmol/L 2.0  --  2.6*  --    WBC 10*3/mm3  --  11.84* 11.21* 13.50*   HEMOGLOBIN g/dL  --  13.9 13.8 13.9   HEMATOCRIT %  --  41.9 40.5 39.8   MCV fL  --  87.5 86.7 85.0   MCHC g/dL  --  33.2 34.1 34.9   PLATELETS 10*3/mm3  --  147 147 205     Results from last 7 days   Lab Units 03/16/19  0053 03/15/19  1928   SODIUM mmol/L 135* 135*   POTASSIUM mmol/L 3.8 4.1   MAGNESIUM mg/dL 1.8 1.9   CHLORIDE mmol/L 98 93*   CO2 mmol/L 18.1* 23.7   BUN mg/dL 19 19   CREATININE mg/dL 1.14* 1.22*   EGFR IF NONAFRICN AM mL/min/1.73 48* 45*   CALCIUM mg/dL 8.7 9.5   GLUCOSE mg/dL 301* 494*   ALBUMIN g/dL  --  4.18   BILIRUBIN mg/dL  --  0.6   ALK PHOS U/L  --  159*   AST (SGOT) U/L  --  18   ALT (SGPT) U/L  --  10   Estimated Creatinine Clearance: 43.4 mL/min (A) (by C-G formula based on SCr of 1.14 mg/dL (H)).  Ammonia   Date Value Ref Range Status   03/16/2019 30 11 - 51 umol/L Final       Hemoglobin A1C   Date/Time Value Ref Range Status   03/16/2019 1042 10.30 (H) 4.80 - 5.60 % Final     Glucose   Date/Time Value Ref Range Status   03/16/2019 1121 365 (H) 70 - 130 mg/dL Final   03/16/2019 0735 411 (H) 70 - 130 mg/dL Final   03/16/2019 0141 264 (H) 70 - 130 mg/dL Final   03/15/2019 2334 286 (H) 70 - 130 mg/dL Final   03/15/2019 2229 326 (H) 70 - 130 mg/dL Final     Lab Results   Component Value Date    HGBA1C 10.30 (H) 03/16/2019     Lab Results   Component Value Date    TSH 1.413 12/29/2018    FREET4 1.10 12/29/2018                      Pain Management Panel     Pain Management Panel Latest Ref Rng & Units 3/15/2019 11/21/2018    CREATININE UR mg/dL - -    AMPHETAMINES SCREEN, URINE Negative Negative Negative    BARBITURATES SCREEN Negative Negative Negative     BENZODIAZEPINE SCREEN, URINE Negative Negative Positive(A)    BUPRENORPHINE Negative Negative Negative    COCAINE SCREEN, URINE Negative Negative Negative    METHADONE SCREEN, URINE Negative Negative Negative          I have personally reviewed the above laboratory results.   ----------------------------------------------------------------------------------------------------------------------  Imaging Results (last 24 hours)     Procedure Component Value Units Date/Time    CT Lower Extremity Right Without Contrast [[id number]] Updated:  03/16/19 1359    XR Chest 1 View [[id number]] Collected:  03/16/19 0851     Updated:  03/16/19 0854    Narrative:       EXAMINATION: XR CHEST 1 VW-      CLINICAL INDICATION:     fever     TECHNIQUE:  XR CHEST 1 VW-      COMPARISON: NONE      FINDINGS: Left PICC with tip in SVC.  The lungs remain aerated.  Heart and mediastinum contours are unremarkable.  No pleural effusion.  No pneumothorax.   Bony and soft tissue structures are unremarkable.       Impression:       No radiographic evidence of acute cardiac or pulmonary  disease.     This report was finalized on 3/16/2019 8:52 AM by Dr. Harrison Biswas MD.       CT Head Without Contrast [[id number]] Collected:  03/16/19 0851     Updated:  03/16/19 0854    Narrative:          EXAMINATION: CT HEAD WO CONTRAST-      CLINICAL INDICATION:     Confusion/delirium, altered LOC, unexplained;  R73.9-Hyperglycemia, unspecified; R31.29-Other microscopic hematuria     TECHNIQUE: Contiguous axial CT images of the head were obtained without  contrast administration.      Radiation dose reduction techniques were utilized per ALARA protocol.  Automated exposure control was initiated through either or [employer] or  [employer] software packages by  protocol.       [id number] mGy.cm     COMPARISON:  None.       FINDINGS: Generalized cerebral atrophy is present. There is no mass  effect, midline displacement, or hydrocephalus. There are patchy  areas  of decreased density within the periventricular white matter which  likely reflect chronic small vessel ischemic changes. There is no CT  evidence of acute infarct or hemorrhage. Bone windows reveal no osseous  abnormalities or fractures.        Impression:       Atrophy and chronic small vessel ischemic change, but there  are no acute intracranial abnormalities identified.         This report was finalized on 3/16/2019 8:51 AM by Dr. Harrison Biswas MD.           I have personally reviewed the above radiology results.   ----------------------------------------------------------------------------------------------------------------------    Assessment/Plan       Assessment/Plan     ASSESSMENT:    1.  Hyperglycemia  2.  Confusion    PLAN:    Patient confused this morning but comfortable. No complaints. Right leg wrapped, CT ordered to rule out cellulitis or abscess. WBC 11.84. Lactic acid normalized. Hemoglobin A1C 10.30.  CRP 0.62. CT of head negative. Blood cultures and wound cultures in progress. Chest x-ray unremarkable.     In the setting of diabetes, and possible wound infection antibiotic therapy was changed to Cefepime 2gm IV Q12H and Vancomycin pharmacy to dose to allow for MRSA and pseudomonal coverage.     Patient tolerated Cefepime and Rocephin the past.     Will continue IV fluids, antibiotic therapy, and glucose management. Sitter at bedside.       Code Status:   Code Status and Medical Interventions:   Ordered at: 03/15/19 6582     Level Of Support Discussed With:    Patient     Code Status:    CPR     Medical Interventions (Level of Support Prior to Arrest):    Full       Bertha Barnes, APRN  03/16/19  2:33 PM

## 2019-03-16 NOTE — NURSING NOTE
When Pt arrived pt was very lethargic. Pt was disoriented to person, place, time and situation. Admission questions were not able to be answered by Pt.

## 2019-03-16 NOTE — ED NOTES
Called both emergency contacts listed in the patient's demographics but unable to reach either of them at this time. Will continue to reach out to them to come  pt for discharge. Provider Dr Perla made aware and Charge nurse Peg made aware.     Kianna Salcedo, GLORIA  03/15/19 2039

## 2019-03-17 ENCOUNTER — APPOINTMENT (OUTPATIENT)
Dept: ULTRASOUND IMAGING | Facility: HOSPITAL | Age: 59
End: 2019-03-17

## 2019-03-17 PROBLEM — E11.621 DIABETIC ULCER OF LEFT FOOT ASSOCIATED WITH TYPE 2 DIABETES MELLITUS (HCC): Status: RESOLVED | Noted: 2017-06-23 | Resolved: 2019-03-17

## 2019-03-17 PROBLEM — S82.201A CLOSED FRACTURE OF RIGHT FIBULA AND TIBIA: Status: RESOLVED | Noted: 2018-12-25 | Resolved: 2019-03-17

## 2019-03-17 PROBLEM — A41.9 SEVERE SEPSIS (HCC): Status: RESOLVED | Noted: 2018-11-22 | Resolved: 2019-03-17

## 2019-03-17 PROBLEM — S82.401A CLOSED FRACTURE OF RIGHT FIBULA AND TIBIA: Status: RESOLVED | Noted: 2018-12-25 | Resolved: 2019-03-17

## 2019-03-17 PROBLEM — L97.529 DIABETIC ULCER OF LEFT FOOT ASSOCIATED WITH TYPE 2 DIABETES MELLITUS: Status: RESOLVED | Noted: 2017-06-23 | Resolved: 2019-03-17

## 2019-03-17 PROBLEM — R65.20 SEVERE SEPSIS (HCC): Status: RESOLVED | Noted: 2018-11-22 | Resolved: 2019-03-17

## 2019-03-17 LAB
ANION GAP SERPL CALCULATED.3IONS-SCNC: 8.8 MMOL/L
BASOPHILS # BLD AUTO: 0.03 10*3/MM3 (ref 0–0.2)
BASOPHILS NFR BLD AUTO: 0.3 % (ref 0–1.5)
BUN BLD-MCNC: 31 MG/DL (ref 8–23)
BUN/CREAT SERPL: 18.2 (ref 7–25)
CALCIUM SPEC-SCNC: 7.9 MG/DL (ref 8.6–10.5)
CHLORIDE SERPL-SCNC: 109 MMOL/L (ref 98–107)
CHLORIDE UR-SCNC: 22 MMOL/L
CO2 SERPL-SCNC: 21.2 MMOL/L (ref 22–29)
CREAT BLD-MCNC: 1.7 MG/DL (ref 0.57–1)
CREAT UR-MCNC: 29.2 MG/DL
CRP SERPL-MCNC: 1.64 MG/DL (ref 0–0.5)
DEPRECATED RDW RBC AUTO: 44.9 FL (ref 37–54)
EOSINOPHIL # BLD AUTO: 0.28 10*3/MM3 (ref 0–0.4)
EOSINOPHIL NFR BLD AUTO: 3.2 % (ref 0.3–6.2)
ERYTHROCYTE [DISTWIDTH] IN BLOOD BY AUTOMATED COUNT: 14.2 % (ref 12.3–15.4)
FERRITIN SERPL-MCNC: 159.4 NG/ML (ref 13–150)
GFR SERPL CREATININE-BSD FRML MDRD: 31 ML/MIN/1.73
GLUCOSE BLD-MCNC: 61 MG/DL (ref 65–99)
GLUCOSE BLDC GLUCOMTR-MCNC: 118 MG/DL (ref 70–130)
GLUCOSE BLDC GLUCOMTR-MCNC: 136 MG/DL (ref 70–130)
GLUCOSE BLDC GLUCOMTR-MCNC: 158 MG/DL (ref 70–130)
GLUCOSE BLDC GLUCOMTR-MCNC: 210 MG/DL (ref 70–130)
GLUCOSE BLDC GLUCOMTR-MCNC: 254 MG/DL (ref 70–130)
GLUCOSE BLDC GLUCOMTR-MCNC: 61 MG/DL (ref 70–130)
HCT VFR BLD AUTO: 32.2 % (ref 34–46.6)
HCT VFR BLD AUTO: 33.8 % (ref 34–46.6)
HGB BLD-MCNC: 10.4 G/DL (ref 12–15.9)
HGB BLD-MCNC: 11 G/DL (ref 12–15.9)
IMM GRANULOCYTES # BLD AUTO: 0.01 10*3/MM3 (ref 0–0.05)
IMM GRANULOCYTES NFR BLD AUTO: 0.1 % (ref 0–0.5)
IRON 24H UR-MRATE: 27 MCG/DL (ref 37–145)
IRON SATN MFR SERPL: 14 % (ref 20–50)
LYMPHOCYTES # BLD AUTO: 3.52 10*3/MM3 (ref 0.7–3.1)
LYMPHOCYTES NFR BLD AUTO: 39.8 % (ref 19.6–45.3)
MAGNESIUM SERPL-MCNC: 3 MG/DL (ref 1.6–2.4)
MCH RBC QN AUTO: 29.3 PG (ref 26.6–33)
MCHC RBC AUTO-ENTMCNC: 32.5 G/DL (ref 31.5–35.7)
MCV RBC AUTO: 89.9 FL (ref 79–97)
MONOCYTES # BLD AUTO: 0.55 10*3/MM3 (ref 0.1–0.9)
MONOCYTES NFR BLD AUTO: 6.2 % (ref 5–12)
NEUTROPHILS # BLD AUTO: 4.45 10*3/MM3 (ref 1.4–7)
NEUTROPHILS NFR BLD AUTO: 50.4 % (ref 42.7–76)
PLATELET # BLD AUTO: 140 10*3/MM3 (ref 140–450)
PMV BLD AUTO: 10.4 FL (ref 6–12)
POTASSIUM BLD-SCNC: 3.8 MMOL/L (ref 3.5–5.2)
POTASSIUM UR-SCNC: 10.3 MMOL/L
PROT UR-MCNC: 120 MG/DL
PROT/CREAT UR: 4109.6 MG/G CREA (ref 0–200)
RBC # BLD AUTO: 3.76 10*6/MM3 (ref 3.77–5.28)
SODIUM BLD-SCNC: 139 MMOL/L (ref 136–145)
SODIUM UR-SCNC: 24 MMOL/L
TIBC SERPL-MCNC: 200 MCG/DL (ref 298–536)
TRANSFERRIN SERPL-MCNC: 134 MG/DL (ref 200–360)
TROPONIN T SERPL-MCNC: 0.1 NG/ML (ref 0–0.03)
WBC NRBC COR # BLD: 8.84 10*3/MM3 (ref 3.4–10.8)

## 2019-03-17 PROCEDURE — 99233 SBSQ HOSP IP/OBS HIGH 50: CPT | Performed by: INTERNAL MEDICINE

## 2019-03-17 PROCEDURE — 85018 HEMOGLOBIN: CPT | Performed by: PHYSICIAN ASSISTANT

## 2019-03-17 PROCEDURE — 83540 ASSAY OF IRON: CPT | Performed by: PHYSICIAN ASSISTANT

## 2019-03-17 PROCEDURE — 25010000002 VANCOMYCIN 5 G RECONSTITUTED SOLUTION 5,000 MG VIAL: Performed by: INTERNAL MEDICINE

## 2019-03-17 PROCEDURE — 63710000001 INSULIN ASPART PER 5 UNITS: Performed by: NURSE PRACTITIONER

## 2019-03-17 PROCEDURE — 84300 ASSAY OF URINE SODIUM: CPT | Performed by: INTERNAL MEDICINE

## 2019-03-17 PROCEDURE — 84484 ASSAY OF TROPONIN QUANT: CPT | Performed by: PHYSICIAN ASSISTANT

## 2019-03-17 PROCEDURE — 82436 ASSAY OF URINE CHLORIDE: CPT | Performed by: INTERNAL MEDICINE

## 2019-03-17 PROCEDURE — 84466 ASSAY OF TRANSFERRIN: CPT | Performed by: PHYSICIAN ASSISTANT

## 2019-03-17 PROCEDURE — 82962 GLUCOSE BLOOD TEST: CPT

## 2019-03-17 PROCEDURE — 76775 US EXAM ABDO BACK WALL LIM: CPT | Performed by: RADIOLOGY

## 2019-03-17 PROCEDURE — 85014 HEMATOCRIT: CPT | Performed by: PHYSICIAN ASSISTANT

## 2019-03-17 PROCEDURE — 25010000002 CEFEPIME 2 G/NS 100 ML SOLUTION: Performed by: NURSE PRACTITIONER

## 2019-03-17 PROCEDURE — 63710000001 INSULIN DETEMIR PER 5 UNITS: Performed by: INTERNAL MEDICINE

## 2019-03-17 PROCEDURE — 82570 ASSAY OF URINE CREATININE: CPT | Performed by: INTERNAL MEDICINE

## 2019-03-17 PROCEDURE — 93005 ELECTROCARDIOGRAM TRACING: CPT | Performed by: PHYSICIAN ASSISTANT

## 2019-03-17 PROCEDURE — 94799 UNLISTED PULMONARY SVC/PX: CPT

## 2019-03-17 PROCEDURE — 85025 COMPLETE CBC W/AUTO DIFF WBC: CPT | Performed by: NURSE PRACTITIONER

## 2019-03-17 PROCEDURE — 25010000002 ENOXAPARIN PER 10 MG: Performed by: NURSE PRACTITIONER

## 2019-03-17 PROCEDURE — 82728 ASSAY OF FERRITIN: CPT | Performed by: PHYSICIAN ASSISTANT

## 2019-03-17 PROCEDURE — 76775 US EXAM ABDO BACK WALL LIM: CPT

## 2019-03-17 PROCEDURE — 93010 ELECTROCARDIOGRAM REPORT: CPT | Performed by: INTERNAL MEDICINE

## 2019-03-17 PROCEDURE — 84156 ASSAY OF PROTEIN URINE: CPT | Performed by: INTERNAL MEDICINE

## 2019-03-17 PROCEDURE — 84133 ASSAY OF URINE POTASSIUM: CPT | Performed by: INTERNAL MEDICINE

## 2019-03-17 PROCEDURE — 86140 C-REACTIVE PROTEIN: CPT | Performed by: NURSE PRACTITIONER

## 2019-03-17 PROCEDURE — 83735 ASSAY OF MAGNESIUM: CPT | Performed by: NURSE PRACTITIONER

## 2019-03-17 PROCEDURE — 80048 BASIC METABOLIC PNL TOTAL CA: CPT | Performed by: NURSE PRACTITIONER

## 2019-03-17 PROCEDURE — 63710000001 INSULIN DETEMIR PER 5 UNITS: Performed by: PHYSICIAN ASSISTANT

## 2019-03-17 RX ORDER — PANTOPRAZOLE SODIUM 40 MG/10ML
40 INJECTION, POWDER, LYOPHILIZED, FOR SOLUTION INTRAVENOUS EVERY 12 HOURS SCHEDULED
Status: DISCONTINUED | OUTPATIENT
Start: 2019-03-17 | End: 2019-03-20 | Stop reason: HOSPADM

## 2019-03-17 RX ORDER — ESCITALOPRAM OXALATE 10 MG/1
5 TABLET ORAL NIGHTLY
Status: DISCONTINUED | OUTPATIENT
Start: 2019-03-17 | End: 2019-03-19

## 2019-03-17 RX ORDER — AMLODIPINE BESYLATE 5 MG/1
5 TABLET ORAL
Status: DISCONTINUED | OUTPATIENT
Start: 2019-03-17 | End: 2019-03-17

## 2019-03-17 RX ORDER — ATORVASTATIN CALCIUM 40 MG/1
40 TABLET, FILM COATED ORAL DAILY
Status: DISCONTINUED | OUTPATIENT
Start: 2019-03-18 | End: 2019-03-20 | Stop reason: HOSPADM

## 2019-03-17 RX ORDER — HEPARIN SODIUM 5000 [USP'U]/ML
5000 INJECTION, SOLUTION INTRAVENOUS; SUBCUTANEOUS EVERY 12 HOURS SCHEDULED
Status: DISCONTINUED | OUTPATIENT
Start: 2019-03-18 | End: 2019-03-17

## 2019-03-17 RX ADMIN — ROPINIROLE HYDROCHLORIDE 0.5 MG: 0.25 TABLET, FILM COATED ORAL at 09:35

## 2019-03-17 RX ADMIN — ATENOLOL 25 MG: 25 TABLET ORAL at 09:35

## 2019-03-17 RX ADMIN — ATORVASTATIN CALCIUM 10 MG: 10 TABLET, FILM COATED ORAL at 09:34

## 2019-03-17 RX ADMIN — VANCOMYCIN HYDROCHLORIDE 750 MG: 5 INJECTION, POWDER, LYOPHILIZED, FOR SOLUTION INTRAVENOUS at 04:27

## 2019-03-17 RX ADMIN — ESCITALOPRAM OXALATE 5 MG: 10 TABLET ORAL at 22:02

## 2019-03-17 RX ADMIN — AMLODIPINE BESYLATE 5 MG: 5 TABLET ORAL at 09:35

## 2019-03-17 RX ADMIN — SODIUM CHLORIDE 100 ML/HR: 9 INJECTION, SOLUTION INTRAVENOUS at 17:35

## 2019-03-17 RX ADMIN — FLUOXETINE 20 MG: 20 CAPSULE ORAL at 09:35

## 2019-03-17 RX ADMIN — PANTOPRAZOLE SODIUM 40 MG: 40 INJECTION, POWDER, FOR SOLUTION INTRAVENOUS at 22:21

## 2019-03-17 RX ADMIN — GABAPENTIN 600 MG: 300 CAPSULE ORAL at 22:03

## 2019-03-17 RX ADMIN — CEFEPIME 2 G: 2 INJECTION, POWDER, FOR SOLUTION INTRAVENOUS at 23:41

## 2019-03-17 RX ADMIN — INSULIN DETEMIR 25 UNITS: 100 INJECTION, SOLUTION SUBCUTANEOUS at 09:36

## 2019-03-17 RX ADMIN — INSULIN ASPART 2 UNITS: 100 INJECTION, SOLUTION INTRAVENOUS; SUBCUTANEOUS at 17:35

## 2019-03-17 RX ADMIN — ROPINIROLE HYDROCHLORIDE 0.5 MG: 0.25 TABLET, FILM COATED ORAL at 22:22

## 2019-03-17 RX ADMIN — INSULIN ASPART 4 UNITS: 100 INJECTION, SOLUTION INTRAVENOUS; SUBCUTANEOUS at 21:29

## 2019-03-17 RX ADMIN — ENOXAPARIN SODIUM 40 MG: 40 INJECTION SUBCUTANEOUS at 09:35

## 2019-03-17 RX ADMIN — INSULIN DETEMIR 15 UNITS: 100 INJECTION, SOLUTION SUBCUTANEOUS at 22:09

## 2019-03-17 RX ADMIN — SODIUM CHLORIDE 100 ML/HR: 9 INJECTION, SOLUTION INTRAVENOUS at 04:28

## 2019-03-17 RX ADMIN — PANTOPRAZOLE SODIUM 40 MG: 40 TABLET, DELAYED RELEASE ORAL at 06:50

## 2019-03-17 RX ADMIN — INSULIN ASPART 2 UNITS: 100 INJECTION, SOLUTION INTRAVENOUS; SUBCUTANEOUS at 11:50

## 2019-03-17 RX ADMIN — GABAPENTIN 600 MG: 300 CAPSULE ORAL at 09:34

## 2019-03-17 RX ADMIN — FLUDROCORTISONE ACETATE 0.1 MG: 0.1 TABLET ORAL at 09:34

## 2019-03-17 NOTE — PAYOR COMM NOTE
"[facility]   [facility] [place]  PHONE  [phone]  FAX  [phone]  NPI:  [id number]    REQUEST FOR INPATIENT AUTH    Reny Merino (59 y.o. Female)     Date of Birth Social Security Number Address Home Phone MRN    1960  [address] [facility] [address] [phone] [id number]    Denominational Marital Status          Tenriism        Admission Date Admission Type Admitting Provider Attending Provider Department, Room/Bed    3/15/19 Emergency Ricci Rubio MD Grace, Aimee Russell, DO [facility] OBSERVATION UNIT, 210/1S    Discharge Date Discharge Disposition Discharge Destination                       Attending Provider:  Berenice Hicks DO    Allergies:  Latex, Morphine And Related, Penicillins, Codeine, Sulfa Antibiotics    Isolation:  None   Infection:  None   Code Status:  CPR    Ht:  170.2 cm (67\")   Wt:  53.1 kg (117 lb 1 oz)    Admission Cmt:  None   Principal Problem:  None                Active Insurance as of 3/15/2019     Primary Coverage     Payor Plan Insurance Group Employer/Plan Group    [employer] [place] [employer] [place]ICA[place]      Payor Plan Address Payor Plan Phone Number Payor Plan Fax Number Effective Dates    [address] [address] [phone]  2017 - None Entered    [facility] [id number]       Subscriber Name Subscriber Birth Date Member ID       RENY MERINO 1960 [id number]                 Emergency Contacts      (Rel.) Home Phone Work Phone Mobile Phone    Liza Oliva (Daughter) [phone] -- --    Cliff Leiva (Significant Other) [phone] -- --               History & Physical      Shania Justin, DARIUS at 3/15/2019 11:30 PM              HISTORY AND PHYSICAL    Patient Identification:  Name:  Reny Merino  Age:  59 y.o.  Sex:  female  :  1960  MRN:  [id number]   Visit Number:  [id number]  Primary Care Physician:  Yandel Paulson MD       Subjective     Subjective     Chief complaint:     Chief Complaint "   Patient presents with   • Vomiting   • Nausea   • Fever       History of presenting illness:     According to ER report, patient was brought into the ER for nausea, vomiting and fever.  Patient was admitted to the observation unit for further evaluation monitoring.  Upon my evaluation, patient was very confused and was unable to answer any questions.    Blood glucose in the ED is 494 which was treated, hyponatremia at 135, lipase 11, magnesium 1.9, white blood count 13.50, influenza negative, UA not concerning for UTI, urine tox screen negative, chest x-ray pending.  ---------------------------------------------------------------------------------------------------------------------  Review of Systems   Unable to perform ROS: Mental status change     ---------------------------------------------------------------------------------------------------------------------     Past Medical History    Past Medical History:   Diagnosis Date   • Arthritis    • Depression    • Essential hypertension    • Hyperlipidemia    • PAD (peripheral artery disease) (CMS/[facility])    • Type 2 diabetes mellitus (CMS/[facility])        Past Surgical History    Past Surgical History:   Procedure Laterality Date   • BACK SURGERY      Post spinal diskectomy, osteophytectomy in one lumbar interspace   • CATARACT EXTRACTION      Left    •  SECTION     • DENTAL PROCEDURE     • HYSTERECTOMY     • INCISION AND DRAINAGE LEG Left 4/15/2017    Procedure: INCISION AND DRAINAGE LOWER EXTREMITY;  Surgeon: Basilio Morris MD;  Location: [facility];  Service:    • INCISION AND DRAINAGE LEG Left 2017    Procedure: Irrigation and Debridment abcess diabetic wound left foot with deep culture;  Surgeon: Basilio Morris MD;  Location: [facility];  Service:    • KNEE ARTHROSCOPY Right    • ORIF TIBIA FRACTURE Right 2018    Procedure: TIBIAL  FRACTURE OPEN REDUCTION INTERNAL FIXATION;  Surgeon: Jung Barragan MD;  Location: [facility];  Service: Orthopedics    • TOE AMPUTATION Right     5th   • TUBAL ABDOMINAL LIGATION         Family History    Family History   Problem Relation Age of Onset   • Heart disease Mother    • Cancer Mother    • COPD Mother    • Diabetes Other    • Depression Other        Social History    Social History     Tobacco Use   • Smoking status: Never Smoker   Substance Use Topics   • Alcohol use: No   • Drug use: No       Allergies    Latex; Morphine and related; Penicillins; Codeine; and Sulfa antibiotics  ---------------------------------------------------------------------------------------------------------------------     Home Medications:    Prior to Admission Medications     Prescriptions Last Dose Informant Patient Reported? Taking?    gabapentin (NEURONTIN) 800 MG tablet  CECI Yes Yes    Take 800 mg by mouth 3 (Three) Times a Day.    amLODIPine (NORVASC) 10 MG tablet  Self No No    Take 1 tablet by mouth Daily.    atenolol (TENORMIN) 25 MG tablet  Self Yes No    Take 25 mg by mouth 2 (Two) Times a Day.    escitalopram (LEXAPRO) 10 MG tablet  Self Yes No    Take 10 mg by mouth Every Night.    fludrocortisone 0.1 MG tablet  Self Yes No    Take 0.1 mg by mouth Daily.    FLUoxetine (PROzac) 20 MG capsule   Yes No    Take 20 mg by mouth.    HYDROcodone-acetaminophen (NORCO) 7.5-325 MG per tablet   No No    Take 1 tablet by mouth Every 4 (Four) Hours As Needed for Moderate Pain .    hydrOXYzine (ATARAX) 25 MG tablet  Self Yes No    Take 25 mg by mouth 3 (Three) Times a Day As Needed (coughing).    insulin aspart (novoLOG) 100 UNIT/ML injection   No No    Sliding scale TID with meals. Glucose 150-199:2 units. 240-249: 3 units. 250-299- 4 units. 300-349- 5 units. 350-400 :6 units. Over 400: 7un    Insulin Glargine (BASAGLAR KWIKPEN) 100 UNIT/ML injection pen  Self No No    Inject 25 Units under the skin into the appropriate area as directed 2 (Two) Times a Day.    iron polysaccharides (NIFEREX) 150 MG capsule   No No    Take 1 capsule by mouth  Daily.    pantoprazole (PROTONIX) 40 MG EC tablet  Self Yes No    Take 40 mg by mouth Daily.    sennosides-docusate sodium (SENOKOT-S) 8.6-50 MG tablet  Self No No    Take 1 tablet by mouth 2 (Two) Times a Day.    Patient taking differently:  Take 1 tablet by mouth 2 (Two) Times a Day As Needed.    temazepam (RESTORIL) 30 MG capsule  Self Yes No    Take 30 mg by mouth Every Night.    tiZANidine (ZANAFLEX) 4 MG tablet  Self Yes No    Take 4 mg by mouth Every 6 (Six) Hours As Needed for Muscle Spasms.    vitamin D (ERGOCALCIFEROL) 00430 units capsule capsule  Self Yes No    Take 50,000 Units by mouth 1 (One) Time Per Week. Prior to [facility], Patient was on: takes on wednesdays        ---------------------------------------------------------------------------------------------------------------------    Objective     Objective     Hospital Scheduled Meds:    aztreonam 2 g Intravenous Q8H   enoxaparin 40 mg Subcutaneous Q24H   insulin aspart 0-7 Units Subcutaneous 4x Daily AC & at Bedtime   sodium chloride 3 mL Intravenous Q12H   [START ON 3/17/2019] vancomycin 15 mg/kg Intravenous Q24H       Pharmacy to dose vancomycin      ---------------------------------------------------------------------------------------------------------------------   Vital Signs:  Temp:  [99 °F (37.2 °C)-103.3 °F (39.6 °C)] 99.8 °F (37.7 °C)  Heart Rate:  [] 96  Resp:  [17-20] 20  BP: (136-173)/() 136/78  Mean Arterial Pressure (Non-Invasive) for the past 24 hrs (Last 3 readings):   Noninvasive MAP (mmHg)   03/16/19 0502 97   03/16/19 0100 105   03/15/19 2337 108     SpO2 Percentage    03/15/19 2048 03/15/19 2053 03/16/19 0502   SpO2: (!) 80% 96% 98%     SpO2:  [80 %-98 %] 98 %  on  Flow (L/min):  [2] 2;   Device (Oxygen Therapy): nasal cannula    Body mass index is 18.33 kg/m².  Wt Readings from Last 3 Encounters:   03/15/19 53.1 kg (117 lb 1 oz)   02/14/19 56.7 kg (125 lb)   12/30/18 73.3 kg (161 lb 9.6 oz)      ---------------------------------------------------------------------------------------------------------------------     Physical Exam:    Constitutional:  Well-developed and well-nourished.  No respiratory distress.      HENT:  Head: Normocephalic and atraumatic.  Mouth:  Moist mucous membranes.    Eyes:  Conjunctivae and EOM are normal.  No scleral icterus.  Neck:  Neck supple.  No JVD present.    Cardiovascular:  Normal rate, regular rhythm and normal heart sounds with no murmur. No edema.  Pulmonary/Chest:  No respiratory distress, no wheezes, no crackles, with normal breath sounds and good air movement.  Abdominal:  Soft.  Bowel sounds are normal.  No distension and no tenderness.   Musculoskeletal:  No edema, no tenderness, and no deformity.  No swelling or redness of joints.  Neurological: Confused  Skin:  Skin is warm and dry.  No rash noted.  No pallor.   Psychiatric:  Normal mood and affect.  Behavior is normal.    ---------------------------------------------------------------------------------------------------------------------  I have personally reviewed the EKG/Telemetry strip  ---------------------------------------------------------------------------------------------------------------------           Results from last 7 days   Lab Units 03/16/19  0018   PH, ARTERIAL pH units 7.419   PO2 ART mm Hg 53.7*   PCO2, ARTERIAL mm Hg 35.6   HCO3 ART mmol/L 22.5     Results from last 7 days   Lab Units 03/16/19  0334 03/16/19  0053 03/15/19  1928   LACTATE mmol/L  --  2.6*  --    WBC 10*3/mm3 11.84* 11.21* 13.50*   HEMOGLOBIN g/dL 13.9 13.8 13.9   HEMATOCRIT % 41.9 40.5 39.8   MCV fL 87.5 86.7 85.0   MCHC g/dL 33.2 34.1 34.9   PLATELETS 10*3/mm3 147 147 205     Results from last 7 days   Lab Units 03/16/19  0053 03/15/19  1928   SODIUM mmol/L 135* 135*   POTASSIUM mmol/L 3.8 4.1   MAGNESIUM mg/dL 1.8 1.9   CHLORIDE mmol/L 98 93*   CO2 mmol/L 18.1* 23.7   BUN mg/dL 19 19   CREATININE mg/dL 1.14* 1.22*   EGFR  IF NONAFRICN AM mL/min/1.73 48* 45*   CALCIUM mg/dL 8.7 9.5   GLUCOSE mg/dL 301* 494*   ALBUMIN g/dL  --  4.18   BILIRUBIN mg/dL  --  0.6   ALK PHOS U/L  --  159*   AST (SGOT) U/L  --  18   ALT (SGPT) U/L  --  10   Estimated Creatinine Clearance: 43.4 mL/min (A) (by C-G formula based on SCr of 1.14 mg/dL (H)).  Ammonia   Date Value Ref Range Status   03/16/2019 30 11 - 51 umol/L Final       Glucose   Date/Time Value Ref Range Status   03/15/2019 2334 286 (H) 70 - 130 mg/dL Final   03/15/2019 2229 326 (H) 70 - 130 mg/dL Final     Lab Results   Component Value Date    HGBA1C 9.40 (H) 12/29/2018     Lab Results   Component Value Date    TSH 1.413 12/29/2018    FREET4 1.10 12/29/2018                      Pain Management Panel     Pain Management Panel Latest Ref Rng & Units 3/15/2019 11/21/2018    CREATININE UR mg/dL - -    AMPHETAMINES SCREEN, URINE Negative Negative Negative    BARBITURATES SCREEN Negative Negative Negative    BENZODIAZEPINE SCREEN, URINE Negative Negative Positive(A)    BUPRENORPHINE Negative Negative Negative    COCAINE SCREEN, URINE Negative Negative Negative    METHADONE SCREEN, URINE Negative Negative Negative        I have personally reviewed the above laboratory results.   ---------------------------------------------------------------------------------------------------------------------  Imaging Results (last 7 days)     Procedure Component Value Units Date/Time    CT Head Without Contrast [[id number]] Updated:  03/16/19 0125    XR Chest 1 View [[id number]] Updated:  03/15/19 1928        I have personally reviewed the above radiology results.   ---------------------------------------------------------------------------------------------------------------------      Assessment & Plan        Assessment/Plan       ASSESSMENT:    1.  Hyperglycemia  2.  Confusion    PLAN:    According to ER report, patient was brought into the ER for nausea, vomiting and fever.  Patient was admitted to the  observation unit for further evaluation monitoring.  Upon my evaluation, patient was very confused and was unable to answer any questions.    Blood glucose in the ED is 494 which was treated, hyponatremia at 135, lipase 11, magnesium 1.9, white blood count 13.50, influenza negative, UA not concerning for UTI, urine tox screen negative, chest x-ray pending.    Due to patient's confusion, CT of head ordered as well as ammonia level, ABG, lactic acid, CBC, mag, and EKG.    Patient's temp 102.8 upon arrival to the observation unit, Tylenol given, will continue to monitor.    O2 order to maintain sats greater than 90%.    Patient very confused trying to get out of bed, and pulling at lines and appears very anxious 0.5 mg Ativan IV given.    Patient had positive sepsis screen, sepsis bundle ordered.    Azactam 2 g IV every 8 hours and vancomycin per pharmacy to dose given for sepsis.    Sitter was ordered for bedside    Lovenox 40 mg subcutaneous every 24 hours ordered for VT E prophylaxis.    We will repeat labs in a.m.    Patient's findings and recommendations were discussed with patient and nursing staff      Code Status:   Code Status and Medical Interventions:   Ordered at: 03/15/19 5088     Level Of Support Discussed With:    Patient     Code Status:    CPR     Medical Interventions (Level of Support Prior to Arrest):    Full           DARIUS Gamboa  03/16/19  6:14 AM        Electronically signed by Ricci Rubio MD at 3/17/2019 10:21 AM          Emergency Department Notes      Savannah Rivera at 3/15/2019  7:37 PM        Pt states she is unable to provide a urine sample at this time     Savannah Rivera  03/15/19 1937      Electronically signed by Savannah Rivera at 3/15/2019  7:37 PM     González Perla MD at 3/15/2019  8:25 PM          Subjective   Patient presents to ER with nausea, vomiting and fever.        Vomiting   The primary symptoms include fever, nausea, vomiting and  dysuria.   The illness is also significant for chills and back pain.       Review of Systems   Constitutional: Positive for chills and fever.   HENT: Negative.    Eyes: Negative.    Respiratory: Negative.    Cardiovascular: Negative.    Gastrointestinal: Positive for nausea and vomiting.   Endocrine: Positive for polydipsia and polyuria.   Genitourinary: Positive for difficulty urinating and dysuria.   Musculoskeletal: Positive for back pain.   Allergic/Immunologic: Negative.    Neurological: Negative.    Hematological: Negative.    Psychiatric/Behavioral: Negative.        Past Medical History:   Diagnosis Date   • Arthritis    • Depression    • Essential hypertension    • Hyperlipidemia    • PAD (peripheral artery disease) (CMS/[facility])    • Type 2 diabetes mellitus (CMS/[facility])        Allergies   Allergen Reactions   • Latex      Added based on information entered during case entry, please review and add reactions, type, and severity as needed   • Morphine And Related Confusion   • Penicillins    • Codeine Rash     Pt tolerates Norco @ home   • Sulfa Antibiotics Rash       Past Surgical History:   Procedure Laterality Date   • BACK SURGERY      Post spinal diskectomy, osteophytectomy in one lumbar interspace   • CATARACT EXTRACTION      Left    •  SECTION     • DENTAL PROCEDURE     • HYSTERECTOMY     • INCISION AND DRAINAGE LEG Left 4/15/2017    Procedure: INCISION AND DRAINAGE LOWER EXTREMITY;  Surgeon: Basilio Morris MD;  Location: [facility];  Service:    • INCISION AND DRAINAGE LEG Left 2017    Procedure: Irrigation and Debridment abcess diabetic wound left foot with deep culture;  Surgeon: Basilio Morris MD;  Location: [facility];  Service:    • KNEE ARTHROSCOPY Right    • ORIF TIBIA FRACTURE Right 2018    Procedure: TIBIAL  FRACTURE OPEN REDUCTION INTERNAL FIXATION;  Surgeon: Jung Barragan MD;  Location: [facility];  Service: Orthopedics   • TOE AMPUTATION Right        • TUBAL ABDOMINAL  LIGATION         Family History   Problem Relation Age of Onset   • Heart disease Mother    • Cancer Mother    • COPD Mother    • Diabetes Other    • Depression Other        Social History     Socioeconomic History   • Marital status:      Spouse name: Not on file   • Number of children: Not on file   • Years of education: Not on file   • Highest education level: Not on file   Tobacco Use   • Smoking status: Never Smoker   Substance and Sexual Activity   • Alcohol use: No   • Drug use: No   • Sexual activity: Defer           Objective   Physical Exam   Constitutional: She appears well-developed and well-nourished.   HENT:   Head: Normocephalic and atraumatic.   Eyes: Pupils are equal, round, and reactive to light.   Neck: Normal range of motion.   Cardiovascular: Normal rate.   Pulmonary/Chest: Effort normal.   Abdominal: There is tenderness.   Musculoskeletal: Normal range of motion.   Neurological:   Oriented to person, place and time   Skin: Skin is warm.   Psychiatric: She has a normal mood and affect.   Nursing note and vitals reviewed.      Procedures          ED Course                  MDM      Final diagnoses:   Hyperglycemia   Hematuria, microscopic            González Perla MD  03/15/19 2027       González Perla MD  03/15/19 2226      Electronically signed by González Perla MD at 3/15/2019 10:26 PM     Kianna Salcedo RN at 3/15/2019  8:37 PM        Called both emergency contacts listed in the patient's demographics but unable to reach either of them at this time. Will continue to reach out to them to come  pt for discharge. Provider Dr Perla made aware and Charge nurse Peg made aware.     Kianna Salcedo RN  03/15/19 2039      Electronically signed by Kianna Salcedo RN at 3/15/2019  8:39 PM     Kianna Salcedo RN at 3/15/2019  8:48 PM        Spoke with pt's daughter Liza Oliva, she says herself or someone else will be here to  her mother shortly.      Kianna Salcedo, GLORIA  03/15/19 2049      Electronically signed by Kianna Salcedo RN at 3/15/2019  8:49 PM     Curtis Soler at 3/15/2019 10:22 PM        Shania Justin from [facility] is on the line with Dr. Perla at this time.      Curtis Soler  03/15/19 2222      Electronically signed by Curtis Soler at 3/15/2019 10:22 PM     Tru Spears at 3/15/2019 10:30 PM        Glucose 326. Dr. Perla aware     Tru Spears  03/15/19 2231      Electronically signed by Tru Spears at 3/15/2019 10:31 PM       Hospital Medications (active)       Dose Frequency Start End    acetaminophen (TYLENOL) suppository 650 mg 650 mg Every 4 Hours PRN 3/15/2019     Sig - Route: Insert 1 suppository into the rectum Every 4 (Four) Hours As Needed for Fever. - Rectal    acetaminophen (TYLENOL) tablet 650 mg 650 mg Every 4 Hours PRN 3/15/2019     Sig - Route: Take 2 tablets by mouth Every 4 (Four) Hours As Needed for Mild Pain . - Oral    aluminum-magnesium hydroxide-simethicone (MAALOX MAX) 400-400-40 MG/5ML suspension 15 mL 15 mL Every 6 Hours PRN 3/15/2019     Sig - Route: Take 15 mL by mouth Every 6 (Six) Hours As Needed for Heartburn. - Oral    amLODIPine (NORVASC) tablet 5 mg 5 mg Every 24 Hours Scheduled 3/17/2019     Sig - Route: Take 1 tablet by mouth Daily. - Oral    atenolol (TENORMIN) tablet 25 mg 25 mg 2 Times Daily 3/16/2019     Sig - Route: Take 1 tablet by mouth 2 (Two) Times a Day. - Oral    atorvastatin (LIPITOR) tablet 10 mg 10 mg Daily 3/16/2019     Sig - Route: Take 1 tablet by mouth Daily. - Oral    cefepime (MAXIPIME) 2 g/100 mL 0.9% NS (mbp) 2 g Every 24 Hours 3/17/2019 3/24/2019    Sig - Route: Infuse 100 mL into a venous catheter Daily. - Intravenous    cholecalciferol (VITAMIN D3) capsule 50,000 Units 50,000 Units Weekly 3/20/2019     Sig - Route: Take 1 capsule by mouth 1 (One) Time Per Week. - Oral    CloNIDine (CATAPRES) tablet 0.1 mg 0.1 mg Every 6 Hours PRN 3/16/2019     Sig -  Route: Take 1 tablet by mouth Every 6 (Six) Hours As Needed for High Blood Pressure (greater than 160/90). - Oral    dextrose (D50W) 25 g/ 50mL Intravenous Solution 25 g 25 g Every 15 Minutes PRN 3/15/2019     Sig - Route: Infuse 50 mL into a venous catheter Every 15 (Fifteen) Minutes As Needed for Low Blood Sugar (Blood Sugar Less Than 70). - Intravenous    dextrose (GLUTOSE) oral gel 15 g 15 g Every 15 Minutes PRN 3/15/2019     Sig - Route: Take 15 g by mouth Every 15 (Fifteen) Minutes As Needed for Low Blood Sugar (Blood sugar less than 70). - Oral    enoxaparin (LOVENOX) syringe 40 mg 40 mg Every 24 Hours 3/16/2019     Sig - Route: Inject 0.4 mL under the skin into the appropriate area as directed Daily. - Subcutaneous    escitalopram (LEXAPRO) tablet 10 mg 10 mg Nightly 3/16/2019     Sig - Route: Take 1 tablet by mouth Every Night. - Oral    fludrocortisone tablet 0.1 mg 0.1 mg Daily 3/16/2019     Sig - Route: Take 1 tablet by mouth Daily. - Oral    FLUoxetine (PROzac) capsule 20 mg 20 mg Daily 3/16/2019     Sig - Route: Take 1 capsule by mouth Daily. - Oral    gabapentin (NEURONTIN) capsule 600 mg 600 mg 2 Times Daily 3/16/2019     Sig - Route: Take 2 capsules by mouth 2 (Two) Times a Day. - Oral    glucagon (human recombinant) (GLUCAGEN DIAGNOSTIC) injection 1 mg 1 mg As Needed 3/15/2019     Sig - Route: Inject 1 mg under the skin into the appropriate area as directed As Needed (Blood Glucose Less Than 70). - Subcutaneous    haloperidol lactate (HALDOL) injection 2 mg 2 mg Every 4 Hours PRN 3/15/2019     Sig - Route: Inject 0.4 mL into the appropriate muscle as directed by prescriber Every 4 (Four) Hours As Needed for Agitation. - Intramuscular    HYDROcodone-acetaminophen (NORCO)  MG per tablet 1 tablet 1 tablet Every 6 Hours PRN 3/16/2019     Sig - Route: Take 1 tablet by mouth Every 6 (Six) Hours As Needed for Mild Pain . - Oral    hydrOXYzine (ATARAX) tablet 25 mg 25 mg Daily PRN 3/16/2019     Sig -  "Route: Take 1 tablet by mouth Daily As Needed for Anxiety. - Oral    insulin aspart (novoLOG) injection 0-7 Units 0-7 Units 4 Times Daily Before Meals & Nightly 3/16/2019     Sig - Route: Inject 0-7 Units under the skin into the appropriate area as directed 4 (Four) Times a Day Before Meals & at Bedtime. - Subcutaneous    insulin detemir (LEVEMIR) injection 25 Units 25 Units Every 12 Hours Scheduled 3/16/2019     Sig - Route: Inject 25 Units under the skin into the appropriate area as directed Every 12 (Twelve) Hours. - Subcutaneous    LORazepam (ATIVAN) injection 0.5 mg 0.5 mg Every 6 Hours PRN 3/16/2019 3/26/2019    Sig - Route: Infuse 0.25 mL into a venous catheter Every 6 (Six) Hours As Needed for Anxiety. - Intravenous    Magnesium Sulfate 2 gram / 50mL Infusion (GIVE X 3 BAGS TO EQUAL 6GM TOTAL DOSE) - Mg 1.1 - 1.5 mg/dl 2 g As Needed 3/16/2019     Sig - Route: Infuse 50 mL into a venous catheter As Needed (See Administration Instructions). - Intravenous    Linked Group 1:  \"Or\" Linked Group Details        Magnesium Sulfate 2 gram Bolus, followed by 8 gram infusion (total Mg dose 10 grams)- Mg less than or equal to 1mg/dL 2 g As Needed 3/16/2019     Sig - Route: Infuse 50 mL into a venous catheter As Needed (See Administration Instructions). - Intravenous    Linked Group 1:  \"Or\" Linked Group Details        Magnesium Sulfate 4 gram infusion- Mg 1.6-1.9 mg/dL 4 g As Needed 3/16/2019     Sig - Route: Infuse 100 mL into a venous catheter As Needed (See Administration Instructions). - Intravenous    Linked Group 1:  \"Or\" Linked Group Details        nitroglycerin (NITROSTAT) SL tablet 0.4 mg 0.4 mg Every 5 Minutes PRN 3/15/2019     Sig - Route: Place 1 tablet under the tongue Every 5 (Five) Minutes As Needed for Chest Pain (Chest Pain With Systolic Blood Pressure Greater Than 100). - Sublingual    ondansetron (ZOFRAN) injection 4 mg 4 mg Every 6 Hours PRN 3/15/2019     Sig - Route: Infuse 2 mL into a venous " "catheter Every 6 (Six) Hours As Needed for Nausea or Vomiting. - Intravenous    Linked Group 2:  \"Or\" Linked Group Details        ondansetron (ZOFRAN) tablet 4 mg 4 mg Every 6 Hours PRN 3/15/2019     Sig - Route: Take 1 tablet by mouth Every 6 (Six) Hours As Needed for Nausea or Vomiting. - Oral    Linked Group 2:  \"Or\" Linked Group Details        ondansetron ODT (ZOFRAN-ODT) disintegrating tablet 4 mg 4 mg Every 6 Hours PRN 3/15/2019     Sig - Route: Take 1 tablet by mouth Every 6 (Six) Hours As Needed for Nausea or Vomiting. - Oral    Linked Group 2:  \"Or\" Linked Group Details        pantoprazole (PROTONIX) EC tablet 40 mg 40 mg Every Morning 3/16/2019     Sig - Route: Take 1 tablet by mouth Every Morning. - Oral    potassium chloride (K-DUR,KLOR-CON) CR tablet 40 mEq 40 mEq As Needed 3/16/2019     Sig - Route: Take 2 tablets by mouth As Needed (potassium replacement.  see admin instructions). - Oral    Linked Group 3:  \"Or\" Linked Group Details        potassium chloride (KLOR-CON) packet 40 mEq 40 mEq As Needed 3/16/2019     Sig - Route: Take 40 mEq by mouth As Needed (potassium replacement, see admin instructions). - Oral    Linked Group 3:  \"Or\" Linked Group Details        potassium chloride 10 mEq in 100 mL IVPB 10 mEq Every 1 Hour PRN 3/16/2019     Sig - Route: Infuse 100 mL into a venous catheter Every 1 (One) Hour As Needed (potassium protocol PERIPHERAL - see admin instructions). - Intravenous    Linked Group 3:  \"Or\" Linked Group Details        rOPINIRole (REQUIP) tablet 0.5 mg 0.5 mg 2 Times Daily 3/16/2019     Sig - Route: Take 2 tablets by mouth 2 (Two) Times a Day. - Oral    sodium chloride 0.9 % bolus 1,593 mL 30 mL/kg × 53.1 kg As Needed 3/16/2019     Sig - Route: Infuse 1,593 mL into a venous catheter As Needed (MAP Less Than 65 or SPB <90 or Lactic Acid 4 or Higher). - Intravenous    sodium chloride 0.9 % bolus 500 mL 500 mL Once 3/16/2019 3/16/2019    Sig - Route: Infuse 500 mL into a venous " catheter 1 (One) Time. - Intravenous    sodium chloride 0.9 % flush 3 mL 3 mL Every 12 Hours Scheduled 3/16/2019     Sig - Route: Infuse 3 mL into a venous catheter Every 12 (Twelve) Hours. - Intravenous    sodium chloride 0.9 % flush 3-10 mL 3-10 mL As Needed 3/15/2019     Sig - Route: Infuse 3-10 mL into a venous catheter As Needed for Line Care. - Intravenous    sodium chloride 0.9 % infusion 100 mL/hr Continuous 3/16/2019     Sig - Route: Infuse 100 mL/hr into a venous catheter Continuous. - Intravenous    temazepam (RESTORIL) capsule 30 mg 30 mg Nightly 3/16/2019     Sig - Route: Take 2 capsules by mouth Every Night. - Oral    tiZANidine (ZANAFLEX) tablet 4 mg 4 mg Every 6 Hours PRN 3/16/2019     Sig - Route: Take 1 tablet by mouth Every 6 (Six) Hours As Needed for Muscle Spasms. - Oral    vancomycin (VANCOCIN) 750 mg in sodium chloride 0.9 % 250 mL IVPB 15 mg/kg × 53.1 kg Every 24 Hours 3/17/2019 3/24/2019    Sig - Route: Infuse 750 mg into a venous catheter Daily. - Intravenous    amLODIPine (NORVASC) tablet 10 mg (Discontinued) 10 mg Every 24 Hours Scheduled 3/16/2019 3/17/2019    Sig - Route: Take 1 tablet by mouth Daily. - Oral    cefepime (MAXIPIME) 2 g/100 mL 0.9% NS (mbp) (Discontinued) 2 g Every 12 Hours 3/16/2019 3/17/2019    Sig - Route: Infuse 100 mL into a venous catheter Every 12 (Twelve) Hours. - Intravenous    Pharmacy Consult - Pharmacy to dose (Discontinued)  Continuous PRN 3/17/2019 3/17/2019    Sig - Route: Continuous As Needed for Consult. - Does not apply    Reason for Discontinue: *Therapy completed          Orders (last 24 hrs)     Start     Ordered    03/20/19 0900  cholecalciferol (VITAMIN D3) capsule 50,000 Units  Weekly      03/16/19 1127    03/18/19 0400  CBC & Differential  Morning Draw      03/17/19 1029    03/18/19 0400  Comprehensive Metabolic Panel  Morning Draw      03/17/19 1029    03/18/19 0400  Magnesium  Morning Draw      03/17/19 1029    03/18/19 0300  Vancomycin, Trough   Timed      03/17/19 1000    03/18/19 0200  A vancomycin trough level has been ordered prior to the 0400 dose 3/18. Draw level at 0300.  Hold dose if trough level is greater than 20 mg/L.  Nursing Communication  Once     Comments:  A vancomycin trough level has been ordered prior to the 0400 dose 3/18. Draw level at 0300.  Hold dose if trough level is greater than 20 mg/L.    03/17/19 1000    03/17/19 2200  cefepime (MAXIPIME) 2 g/100 mL 0.9% NS (mbp)  Every 24 Hours      03/17/19 0752    03/17/19 1458  Inpatient Admission  Once      03/17/19 1457    03/17/19 1456  Transfer Patient  Once      03/17/19 1456    03/17/19 1152  POC Glucose Once  Once      03/17/19 1107    03/17/19 1030  Potassium, Urine, Random - Urine, Clean Catch  Once      03/17/19 1029    03/17/19 1030  Chloride, Urine, Random - Urine, Clean Catch  Once      03/17/19 1029    03/17/19 1030  Sodium, Urine, Random - Urine, Clean Catch  Once      03/17/19 1029    03/17/19 1030  Protein / Creatinine Ratio, Urine - Urine, Clean Catch  Once      03/17/19 1029    03/17/19 1030  Urinalysis without microscopic (no culture) -  Once      03/17/19 1029    03/17/19 1030  US Renal Bilateral  1 Time Imaging,   Status:  Canceled      03/17/19 1029    03/17/19 0900  amLODIPine (NORVASC) tablet 5 mg  Every 24 Hours Scheduled      03/17/19 0733    03/17/19 0730  Pharmacy Consult - Pharmacy to dose  Continuous PRN,   Status:  Discontinued      03/17/19 0730    03/17/19 0730  Inpatient Nephrology Consult  Once     Specialty:  Nephrology  Provider:  Santiago Burks MD    03/17/19 0729    03/17/19 0729  POC Glucose Once  Once      03/17/19 0721    03/17/19 0729  US Renal Limited  1 Time Imaging      03/17/19 0728    03/17/19 0600  C-reactive Protein  Morning Draw      03/16/19 0715    03/17/19 0600  CBC Auto Differential  PROCEDURE ONCE      03/17/19 0002    03/17/19 0528  POC Glucose Once  Once      03/17/19 0520    03/17/19 0441  POC Glucose Once  Once      03/17/19  0433    03/17/19 0300  vancomycin (VANCOCIN) 750 mg in sodium chloride 0.9 % 250 mL IVPB  Every 24 Hours      03/16/19 0412    03/16/19 2143  POC Glucose Once  Once      03/16/19 2135    03/16/19 2100  cefepime (MAXIPIME) 2 g/100 mL 0.9% NS (mbp)  Every 12 Hours,   Status:  Discontinued      03/16/19 0728    03/16/19 2100  escitalopram (LEXAPRO) tablet 10 mg  Nightly      03/16/19 1127    03/16/19 2100  temazepam (RESTORIL) capsule 30 mg  Nightly      03/16/19 1127    03/16/19 2045  sodium chloride 0.9 % bolus 500 mL  Once      03/16/19 1948    03/16/19 1649  POC Glucose Once  Once      03/16/19 1641    03/16/19 1300  amLODIPine (NORVASC) tablet 10 mg  Every 24 Hours Scheduled,   Status:  Discontinued      03/16/19 1127    03/16/19 1300  atenolol (TENORMIN) tablet 25 mg  2 Times Daily      03/16/19 1127    03/16/19 1300  fludrocortisone tablet 0.1 mg  Daily      03/16/19 1127    03/16/19 1300  FLUoxetine (PROzac) capsule 20 mg  Daily      03/16/19 1127    03/16/19 1300  gabapentin (NEURONTIN) capsule 600 mg  2 Times Daily      03/16/19 1127    03/16/19 1300  hydrOXYzine (ATARAX) tablet 25 mg  Daily PRN      03/16/19 1127    03/16/19 1300  insulin detemir (LEVEMIR) injection 25 Units  Every 12 Hours Scheduled      03/16/19 1127    03/16/19 1300  pantoprazole (PROTONIX) EC tablet 40 mg  Every Morning      03/16/19 1127    03/16/19 1300  atorvastatin (LIPITOR) tablet 10 mg  Daily      03/16/19 1127    03/16/19 1300  rOPINIRole (REQUIP) tablet 0.5 mg  2 Times Daily      03/16/19 1127    03/16/19 1300  tiZANidine (ZANAFLEX) tablet 4 mg  Every 6 Hours PRN      03/16/19 1127    03/16/19 1118  HYDROcodone-acetaminophen (NORCO)  MG per tablet 1 tablet  Every 6 Hours PRN      03/16/19 1127    03/16/19 1110  CloNIDine (CATAPRES) tablet 0.1 mg  Every 6 Hours PRN      03/16/19 1127    03/16/19 0900  enoxaparin (LOVENOX) syringe 40 mg  Every 24 Hours      03/15/19 2339    03/16/19 0800  sodium chloride 0.9 % infusion   Continuous      03/16/19 0714    03/16/19 0708  Magnesium Sulfate 2 gram Bolus, followed by 8 gram infusion (total Mg dose 10 grams)- Mg less than or equal to 1mg/dL  As Needed      03/16/19 0708    03/16/19 0708  Magnesium Sulfate 2 gram / 50mL Infusion (GIVE X 3 BAGS TO EQUAL 6GM TOTAL DOSE) - Mg 1.1 - 1.5 mg/dl  As Needed      03/16/19 0708    03/16/19 0708  Magnesium Sulfate 4 gram infusion- Mg 1.6-1.9 mg/dL  As Needed      03/16/19 0708    03/16/19 0708  potassium chloride (K-DUR,KLOR-CON) CR tablet 40 mEq  As Needed      03/16/19 0708 03/16/19 0708  potassium chloride (KLOR-CON) packet 40 mEq  As Needed      03/16/19 0708 03/16/19 0708  potassium chloride 10 mEq in 100 mL IVPB  Every 1 Hour PRN      03/16/19 0708 03/16/19 0700  POC Glucose 4x Daily AC & at Bedtime  4 Times Daily Before Meals & at Bedtime      03/15/19 2339    03/16/19 0600  Basic Metabolic Panel  Daily      03/15/19 2339 03/16/19 0600  CBC & Differential  Daily      03/15/19 2339    03/16/19 0600  Magnesium  Daily      03/15/19 2339    03/16/19 0211  sodium chloride 0.9 % bolus 1,593 mL  As Needed      03/16/19 0213    03/16/19 0105  LORazepam (ATIVAN) injection 0.5 mg  Every 6 Hours PRN      03/16/19 0107    03/16/19 0030  sodium chloride 0.9 % flush 3 mL  Every 12 Hours Scheduled      03/15/19 2339 03/16/19 0030  insulin aspart (novoLOG) injection 0-7 Units  4 Times Daily Before Meals & Nightly      03/15/19 2339    03/15/19 2341  acetaminophen (TYLENOL) suppository 650 mg  Every 4 Hours PRN      03/15/19 2345    03/15/19 2339  dextrose (GLUTOSE) oral gel 15 g  Every 15 Minutes PRN      03/15/19 2339    03/15/19 2339  dextrose (D50W) 25 g/ 50mL Intravenous Solution 25 g  Every 15 Minutes PRN      03/15/19 2339    03/15/19 2339  glucagon (human recombinant) (GLUCAGEN DIAGNOSTIC) injection 1 mg  As Needed      03/15/19 2339    03/15/19 2339  ondansetron (ZOFRAN) tablet 4 mg  Every 6 Hours PRN      03/15/19 2339    03/15/19 2339   ondansetron ODT (ZOFRAN-ODT) disintegrating tablet 4 mg  Every 6 Hours PRN      03/15/19 2339    03/15/19 2339  ondansetron (ZOFRAN) injection 4 mg  Every 6 Hours PRN      03/15/19 2339    03/15/19 2338  sodium chloride 0.9 % flush 3-10 mL  As Needed      03/15/19 2339    03/15/19 2338  nitroglycerin (NITROSTAT) SL tablet 0.4 mg  Every 5 Minutes PRN      03/15/19 2339    03/15/19 2338  acetaminophen (TYLENOL) tablet 650 mg  Every 4 Hours PRN      03/15/19 2339    03/15/19 2338  aluminum-magnesium hydroxide-simethicone (MAALOX MAX) 400-400-40 MG/5ML suspension 15 mL  Every 6 Hours PRN      03/15/19 2339    03/15/19 2240  haloperidol lactate (HALDOL) injection 2 mg  Every 4 Hours PRN      03/15/19 2241    03/15/19 0000  nitrofurantoin, macrocrystal-monohydrate, (MACROBID) 100 MG capsule  2 Times Daily      03/15/19 2022    Unscheduled  ECG 12 Lead  As Needed     Comments:  Nurse to Release if Patient Expericences Acute Chest Pain or Dysrhythmias    03/15/19 2339    Unscheduled  Potassium  As Needed     Comments:  For Ventricular Arrhythmias      03/15/19 2339    Unscheduled  Magnesium  As Needed     Comments:  For Ventricular Arrhythmias      03/15/19 2339    Unscheduled  Troponin  As Needed     Comments:  For Chest Pain      03/15/19 2339    Unscheduled  Digoxin Level  As Needed     Comments:  For Atrial Arrhythmias      03/15/19 2339    Unscheduled  Blood Gas, Arterial  As Needed     Comments:  Per O2 PolicyNotify Physician      03/15/19 2339    Unscheduled  Magnesium  As Needed      03/16/19 0708    Unscheduled  Potassium  As Needed      03/16/19 0708    Unscheduled  Change Dressing to IV Site As Needed When Damp, Loose or Soiled  As Needed      03/16/19 0742    Unscheduled  Change Needleless Connectors  As Needed     Comments:  Change Needleless Connectors When:  - Removed For Any Reason  - Residual Blood or Debris Within Connector  - Prior to Drawing Blood Cultures  - Contamination of Connector  - After  Administration of Blood or Blood Components    19 0742    --  gabapentin (NEURONTIN) 800 MG tablet  3 Times Daily      03/15/19 2247    --  HYDROcodone-acetaminophen (NORCO)  MG per tablet  Every 4 Hours PRN      19 1020    --  pravastatin (PRAVACHOL) 20 MG tablet  Daily      19 1022    --  CloNIDine (CATAPRES) 0.1 MG tablet  Every 6 Hours PRN      19 1022    --  promethazine (PHENERGAN) 25 MG tablet  Every 6 Hours PRN      19 1022    --  rOPINIRole (REQUIP) 0.5 MG tablet  2 Times Daily      19 1022             Physician Progress Notes (last 24 hours) (Notes from 3/16/2019  3:18 PM through 3/17/2019  3:18 PM)      Bertha Barnes APRN at 3/17/2019 11:15 AM                     PROGRESS NOTE         Patient Identification:  Name:  Reny Elena  Age:  59 y.o.  Sex:  female  :  1960  MRN:  [id number]  Visit Number:  [id number]  Primary Care Provider:  Yandel Paulson MD      ----------------------------------------------------------------------------------------------------------------------  Subjective       Chief Complaints:    Vomiting; Nausea; and Fever        Interval History:      Patient's confusion resolved. Hypotensive overnight, now resolved. CRP slightly worsened at 1.64. WBC normal. Creatinine worsening at 1.70.  Ulcer noted to right foot with redness around it, patient reported it had been there approximately 2 weeks. Right tib/fib incision healing well. CT of right lower extremity revealed some soft tissue swelling likely representing cellulitis.       ----------------------------------------------------------------------------------------------------------------------      Objective       Current Hospital Meds:    amLODIPine 5 mg Oral Q24H   atenolol 25 mg Oral BID   atorvastatin 10 mg Oral Daily   cefepime 2 g Intravenous Q24H   [START ON 3/20/2019] cholecalciferol 50,000 Units Oral Weekly   enoxaparin 40 mg Subcutaneous Q24H   escitalopram 10 mg  Oral Nightly   fludrocortisone 0.1 mg Oral Daily   FLUoxetine 20 mg Oral Daily   gabapentin 600 mg Oral BID   insulin aspart 0-7 Units Subcutaneous 4x Daily AC & at Bedtime   insulin detemir 25 Units Subcutaneous Q12H   pantoprazole 40 mg Oral QAM   rOPINIRole 0.5 mg Oral BID   sodium chloride 3 mL Intravenous Q12H   temazepam 30 mg Oral Nightly   vancomycin 15 mg/kg Intravenous Q24H       sodium chloride 100 mL/hr Last Rate: 100 mL/hr (03/17/19 1150)     ----------------------------------------------------------------------------------------------------------------------    Vital Signs:  Temp:  [97.6 °F (36.4 °C)-98.6 °F (37 °C)] 98.6 °F (37 °C)  Heart Rate:  [51-74] 71  Resp:  [20] 20  BP: ()/(39-72) 137/72  Mean Arterial Pressure (Non-Invasive) for the past 24 hrs (Last 3 readings):   Noninvasive MAP (mmHg)   03/17/19 1246 97   03/17/19 0729 95   03/17/19 0430 67     SpO2 Percentage    03/17/19 0430 03/17/19 0729 03/17/19 1246   SpO2: 97% 96% 99%     SpO2:  [96 %-99 %] 99 %  on  Flow (L/min):  [2] 2;   Device (Oxygen Therapy): nasal cannula    Body mass index is 18.33 kg/m².  Wt Readings from Last 3 Encounters:   03/15/19 53.1 kg (117 lb 1 oz)   02/14/19 56.7 kg (125 lb)   12/30/18 73.3 kg (161 lb 9.6 oz)        Intake/Output Summary (Last 24 hours) at 3/17/2019 1315  Last data filed at 3/16/2019 1700  Gross per 24 hour   Intake 118 ml   Output --   Net 118 ml     Diet Regular; Consistent Carbohydrate  ----------------------------------------------------------------------------------------------------------------------    Physical exam:    Constitutional:  Well-developed and well-nourished.  No respiratory distress.      HENT:  Head:  Normocephalic and atraumatic.  Mouth:  Moist mucous membranes.    Eyes:  Conjunctivae and EOM are normal.  No scleral icterus.    Neck:  Neck supple.  No JVD present.    Cardiovascular:  Normal rate, regular rhythm and normal heart sounds with no murmur. No  edema.  Pulmonary/Chest:  No respiratory distress, no wheezes, no crackles, with normal breath sounds and good air movement.  Abdominal:  Soft.  Bowel sounds are normal.  No distension and no tenderness.   Musculoskeletal:  RLE in ace wrap from Tib/Fib surgery in December, redness noted to ulcer on right heel.    Neurological:  Confused.  No slurred speech.   Skin:  Skin is warm and dry. No rash noted. No pallor.     ----------------------------------------------------------------------------------------------------------------------  I have personally reviewed the EKG/Telemetry strips   ----------------------------------------------------------------------------------------------------------------------          Results from last 7 days   Lab Units 03/16/19  0018   PH, ARTERIAL pH units 7.419   PO2 ART mm Hg 53.7*   PCO2, ARTERIAL mm Hg 35.6   HCO3 ART mmol/L 22.5     Results from last 7 days   Lab Units 03/17/19  0416 03/16/19  0551 03/16/19  0334 03/16/19  0053   CRP mg/dL 1.64*  --   --  0.62*   LACTATE mmol/L  --  2.0  --  2.6*   WBC 10*3/mm3 8.84  --  11.84* 11.21*   HEMOGLOBIN g/dL 11.0*  --  13.9 13.8   HEMATOCRIT % 33.8*  --  41.9 40.5   MCV fL 89.9  --  87.5 86.7   MCHC g/dL 32.5  --  33.2 34.1   PLATELETS 10*3/mm3 140  --  147 147     Results from last 7 days   Lab Units 03/17/19  0416 03/16/19  0053 03/15/19  1928   SODIUM mmol/L 139 135* 135*   POTASSIUM mmol/L 3.8 3.8 4.1   MAGNESIUM mg/dL 3.0* 1.8 1.9   CHLORIDE mmol/L 109* 98 93*   CO2 mmol/L 21.2* 18.1* 23.7   BUN mg/dL 31* 19 19   CREATININE mg/dL 1.70* 1.14* 1.22*   EGFR IF NONAFRICN AM mL/min/1.73 31* 48* 45*   CALCIUM mg/dL 7.9* 8.7 9.5   GLUCOSE mg/dL 61* 301* 494*   ALBUMIN g/dL  --   --  4.18   BILIRUBIN mg/dL  --   --  0.6   ALK PHOS U/L  --   --  159*   AST (SGOT) U/L  --   --  18   ALT (SGPT) U/L  --   --  10   Estimated Creatinine Clearance: 29.1 mL/min (A) (by C-G formula based on SCr of 1.7 mg/dL (H)).  Ammonia   Date Value Ref Range  Status   03/16/2019 30 11 - 51 umol/L Final       Hemoglobin A1C   Date/Time Value Ref Range Status   03/16/2019 1042 10.30 (H) 4.80 - 5.60 % Final     Glucose   Date/Time Value Ref Range Status   03/17/2019 1107 210 (H) 70 - 130 mg/dL Final   03/17/2019 0721 136 (H) 70 - 130 mg/dL Final   03/17/2019 0520 118 70 - 130 mg/dL Final   03/17/2019 0433 61 (L) 70 - 130 mg/dL Final   03/16/2019 2135 262 (H) 70 - 130 mg/dL Final   03/16/2019 1641 362 (H) 70 - 130 mg/dL Final   03/16/2019 1121 365 (H) 70 - 130 mg/dL Final   03/16/2019 0735 411 (H) 70 - 130 mg/dL Final     Lab Results   Component Value Date    HGBA1C 10.30 (H) 03/16/2019     Lab Results   Component Value Date    TSH 1.413 12/29/2018    FREET4 1.10 12/29/2018                      Pain Management Panel     Pain Management Panel Latest Ref Rng & Units 3/15/2019 11/21/2018    CREATININE UR mg/dL - -    AMPHETAMINES SCREEN, URINE Negative Negative Negative    BARBITURATES SCREEN Negative Negative Negative    BENZODIAZEPINE SCREEN, URINE Negative Negative Positive(A)    BUPRENORPHINE Negative Negative Negative    COCAINE SCREEN, URINE Negative Negative Negative    METHADONE SCREEN, URINE Negative Negative Negative          I have personally reviewed the above laboratory results.   ----------------------------------------------------------------------------------------------------------------------  Imaging Results (last 24 hours)     Procedure Component Value Units Date/Time    CT Lower Extremity Right Without Contrast [[id number]] Collected:  03/17/19 0905     Updated:  03/17/19 0909    Narrative:       EXAMINATION: CT LOWER EXTREMITY RIGHT WO CONTRAST-      CLINICAL INDICATION: Pain      TECHNIQUE: Multiple axial CT images were obtained through the right  lower extremity without IV contrast administration.  The axial CT data  set was used to generate high resolution reformatted images in the  coronal and sagittal planes to facilitate diagnostic accuracy  and  treatment planning.      Radiation dose reduction techniques were utilized per ALARA protocol.  Automated exposure control was initiated through either or CareDose or  DoseRight software packages by  protocol.       [id number] mGy.cm     COMPARISON: None      FINDINGS: Patient has had a prior tibia and hindfoot hardware fixation.  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 Metallic streak artifact limits evaluation.  Fracture lines are still present. Some soft tissue swelling may  represent cellulitis or edema.       Impression:       Patient has had a prior tibia and hindfoot hardware  fixation. 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 Metallic streak artifact limits  evaluation. Fracture lines are still present. Some soft tissue swelling  may represent cellulitis or edema.     This report was finalized on 3/17/2019 9:07 AM by Dr. Harrison Biswas MD.       US Renal Limited [[id number]] Collected:  03/17/19 0905     Updated:  03/17/19 0907    Narrative:       EXAMINATION: US RENAL LIMITED-      CLINICAL INDICATION:     worsening kidney function; R73.9-Hyperglycemia,  unspecified; R31.29-Other microscopic hematuria     TECHNIQUE: Multiplanar gray scale sonographic imaging of the kidneys.      COMPARISON: NONE      FINDINGS: Both kidneys are normal in size and echogenicity. There is no  renal mass, stone, or hydronephrosis seen in either kidney. The right  kidney measures    [id number] cm in length; the left kidney  measures     [id number] cm in length.        Impression:       Unremarkable bilateral renal ultrasound.      This report was finalized on 3/17/2019 9:05 AM by Dr. Harrison Biswas MD.           I have personally reviewed the above radiology results.    ----------------------------------------------------------------------------------------------------------------------    Assessment/Plan       Assessment/Plan     ASSESSMENT:    1.  Hyperglycemia  2.  Confusion    PLAN:    Patient's confusion resolved. Hypotensive overnight, now resolved. CRP slightly worsened at 1.64. WBC normal. Creatinine worsening at 1.70.  Ulcer noted to right foot with redness around it, patient reported it had been there approximately 2 weeks. Right tib/fib incision healing well. CT of right lower extremity revealed some soft tissue swelling likely representing cellulitis.     CT of head negative. Blood cultures and wound cultures in progress.       Chest x-ray unremarkable.     In the setting of diabetes, will continue Cefepime 2gm IV Q12H and Vancomycin pharmacy to dose to allow for MRSA and pseudomonal coverage.     Patient tolerated Cefepime and Rocephin the past.     Will continue IV fluids, antibiotic therapy, and glucose management. Nephrology consulted for worsening serum creatinine.     Due to need for prolonged hospital stay patient will be transferred to inpatient status under hospitalist service.       Code Status:   Code Status and Medical Interventions:   Ordered at: 03/15/19 2734     Level Of Support Discussed With:    Patient     Code Status:    CPR     Medical Interventions (Level of Support Prior to Arrest):    Full       DARIUS Moreno  03/17/19  1:15 PM    Electronically signed by Bertha Barnes APRN at 3/17/2019  1:25 PM          Consult Notes (last 24 hours) (Notes from 3/16/2019  3:18 PM through 3/17/2019  3:18 PM)      Santiago Burks MD at 3/17/2019  8:29 AM          Nephrology Consult Note    Referring Provider: DR juarez   Reason for Consultation: Acute renal failure    Subjective Dehydration    History of present illness:  Reny Elena is a 59 y.o. female who presented to [facility] emergency department with chief complaint of nausea  vomiting and fever.  Patient states that she thinks she has been dehydrated has been taking Aldactone but no nephrotoxic medication when she came into the hospital her sugar was 494 and the creatinine patient confusion was 1.7.  Patient sodium was slightly low also but patient states she has been having nausea vomiting but has no diarrhea.  Patient's confusion is better  no reviewed chronic NSAIDS use. Patient denies hematuria, dysuria, difficulty passing urine. No prior history of renal stones . No family history of renal disease    Acute Kidney Injury Risk Factors :    Medications : Reviewed  Contrast : no  NSAIDS : no  Volume depletion : yes  Hemodynamic Instability : no    History  Past Medical History:   Diagnosis Date   • Arthritis    • Depression    • Essential hypertension    • Hyperlipidemia    • PAD (peripheral artery disease) (CMS/[facility])    • Type 2 diabetes mellitus (CMS/[facility])    , Past Surgical History:   Procedure Laterality Date   • BACK SURGERY      Post spinal diskectomy, osteophytectomy in one lumbar interspace   • CATARACT EXTRACTION      Left    •  SECTION     • DENTAL PROCEDURE     • HYSTERECTOMY     • INCISION AND DRAINAGE LEG Left 4/15/2017    Procedure: INCISION AND DRAINAGE LOWER EXTREMITY;  Surgeon: Basilio Morris MD;  Location: [facility] OR;  Service:    • INCISION AND DRAINAGE LEG Left 2017    Procedure: Irrigation and Debridment abcess diabetic wound left foot with deep culture;  Surgeon: Basilio Morris MD;  Location: [facility] OR;  Service:    • KNEE ARTHROSCOPY Right    • ORIF TIBIA FRACTURE Right 2018    Procedure: TIBIAL  FRACTURE OPEN REDUCTION INTERNAL FIXATION;  Surgeon: Jung Barragan MD;  Location: [facility] OR;  Service: Orthopedics   • TOE AMPUTATION Right     5th   • TUBAL ABDOMINAL LIGATION     , Family History   Problem Relation Age of Onset   • Heart disease Mother    • Cancer Mother    • COPD Mother    • Diabetes Other    • Depression Other    , Social History      Tobacco Use   • Smoking status: Never Smoker   Substance Use Topics   • Alcohol use: No   • Drug use: No   , Medications Prior to Admission   Medication Sig Dispense Refill Last Dose   • amLODIPine (NORVASC) 10 MG tablet Take 1 tablet by mouth Daily. 30 tablet 3 Past Week at Unknown time   • atenolol (TENORMIN) 25 MG tablet Take 25 mg by mouth 2 (Two) Times a Day.   Past Week at Unknown time   • escitalopram (LEXAPRO) 10 MG tablet Take 10 mg by mouth Every Night.   Past Week at Unknown time   • fludrocortisone 0.1 MG tablet Take 0.1 mg by mouth Daily.   Past Week at Unknown time   • FLUoxetine (PROzac) 20 MG capsule Take 20 mg by mouth.   Past Week at Unknown time   • gabapentin (NEURONTIN) 800 MG tablet Take 800 mg by mouth 3 (Three) Times a Day.   Past Week at Unknown time   • HYDROcodone-acetaminophen (NORCO)  MG per tablet Take 1 tablet by mouth Every 4 (Four) Hours As Needed for Mild Pain . Pharmacy directions states every 4 to 6 hours prn pain.   Past Week at Unknown time   • hydrOXYzine (ATARAX) 25 MG tablet Take 25 mg by mouth Daily As Needed for Anxiety.   Past Week at Unknown time   • insulin aspart (novoLOG) 100 UNIT/ML injection Sliding scale TID with meals. Glucose 150-199:2 units. 240-249: 3 units. 250-299- 4 units. 300-349- 5 units. 350-400 :6 units. Over 400: 7un (Patient taking differently: Inject 0-6 Units under the skin into the appropriate area as directed 3 (Three) Times a Day Before Meals. Sliding scale TID with meals. Glucose 150-199:2 units. 240-249: 3 units. 250-299- 4 units. 300-349- 5 units. 350-400 :6 units. Over 400: 7un)  12 Past Week at Unknown time   • Insulin Glargine (BASAGLAR KWIKPEN) 100 UNIT/ML injection pen Inject 25 Units under the skin into the appropriate area as directed 2 (Two) Times a Day. 1 mL 12 Past Week at Unknown time   • pantoprazole (PROTONIX) 40 MG EC tablet Take 40 mg by mouth Daily.   Past Week at Unknown time   • pravastatin (PRAVACHOL) 20 MG tablet  Take 20 mg by mouth Daily.   Past Week at Unknown time   • rOPINIRole (REQUIP) 0.5 MG tablet Take 0.5 mg by mouth 2 (Two) Times a Day. Take 1 hour before bedtime.   Past Week at Unknown time   • temazepam (RESTORIL) 30 MG capsule Take 30 mg by mouth Every Night.   Past Week at Unknown time   • tiZANidine (ZANAFLEX) 4 MG tablet Take 4 mg by mouth Every 6 (Six) Hours As Needed for Muscle Spasms.   Past Week at Unknown time   • vitamin D (ERGOCALCIFEROL) 69980 units capsule capsule Take 50,000 Units by mouth 1 (One) Time Per Week. Prior to [facility] Admission, Patient was on: takes on wednesdays   Past Week at Unknown time   • CloNIDine (CATAPRES) 0.1 MG tablet Take 0.1 mg by mouth Every 6 (Six) Hours As Needed for High Blood Pressure (greater than 160/90).   Unknown at Unknown time   • promethazine (PHENERGAN) 25 MG tablet Take 25 mg by mouth Every 6 (Six) Hours As Needed for Nausea or Vomiting.   Unknown at Unknown time   , Scheduled Meds:    amLODIPine 5 mg Oral Q24H   atenolol 25 mg Oral BID   atorvastatin 10 mg Oral Daily   cefepime 2 g Intravenous Q24H   [START ON 3/20/2019] cholecalciferol 50,000 Units Oral Weekly   enoxaparin 40 mg Subcutaneous Q24H   escitalopram 10 mg Oral Nightly   fludrocortisone 0.1 mg Oral Daily   FLUoxetine 20 mg Oral Daily   gabapentin 600 mg Oral BID   insulin aspart 0-7 Units Subcutaneous 4x Daily AC & at Bedtime   insulin detemir 25 Units Subcutaneous Q12H   pantoprazole 40 mg Oral QAM   rOPINIRole 0.5 mg Oral BID   sodium chloride 3 mL Intravenous Q12H   temazepam 30 mg Oral Nightly   vancomycin 15 mg/kg Intravenous Q24H   , Continuous Infusions:    sodium chloride 100 mL/hr Last Rate: 100 mL/hr (03/17/19 0643)   , PRN Meds:  •  acetaminophen  •  acetaminophen  •  aluminum-magnesium hydroxide-simethicone  •  CloNIDine  •  dextrose  •  dextrose  •  glucagon (human recombinant)  •  haloperidol lactate  •  HYDROcodone-acetaminophen  •  hydrOXYzine  •  LORazepam  •  magnesium sulfate  **OR** magnesium sulfate **OR** magnesium sulfate  •  nitroglycerin  •  ondansetron **OR** ondansetron ODT **OR** ondansetron  •  potassium chloride **OR** potassium chloride **OR** potassium chloride  •  sodium chloride  •  sodium chloride  •  tiZANidine and Allergies:  Latex; Morphine and related; Penicillins; Codeine; and Sulfa antibiotics    Review of Systems  More than 10 point review of systems was done. Pertinent items are noted in HPI, all other systems reviewed and negative    Objective     Vital Signs  Temp:  [97.6 °F (36.4 °C)-98.8 °F (37.1 °C)] 97.7 °F (36.5 °C)  Heart Rate:  [] 65  Resp:  [20] 20  BP: ()/(39-67) 156/67    No intake/output data recorded.  I/O last 3 completed shifts:  In: 2454 [P.O.:354; IV Piggyback:2100]  Out: -     Physical Examination:    General Appearance : alert, appears stated age, cooperative   Head : normocephalic, without obvious abnormality and atraumatic  Eyes : conjunctivae and sclerae normal, no icterus, no pallor and PERRLA  Throat : oral mucosa moist  Neck: no adenopathy, suppple, no carotid bruit and no JVD  Lungs : clear to auscultation, respirations regular and unlabored  Heart : regular rhythm & normal rate, normal S1, S2, no murmur, no nahid, no rub Abdomen :  normal bowel sounds, no masses and soft non-tender  Rectal : Deferred  Extremities : moves extremities well, no redness and no edema  Pulses :  palpable and equal bilaterally  Skin : no bleeding, bruising or rash  Neurologic : orientated to person, place, time, grossly no focal deficitis    Laboratory Data :      WBC WBC   Date Value Ref Range Status   03/17/2019 8.84 3.40 - 10.80 10*3/mm3 Final   03/16/2019 11.84 (H) 3.40 - 10.80 10*3/mm3 Final   03/16/2019 11.21 (H) 3.40 - 10.80 10*3/mm3 Final   03/15/2019 13.50 (H) 3.40 - 10.80 10*3/mm3 Final      HGB Hemoglobin   Date Value Ref Range Status   03/17/2019 11.0 (L) 12.0 - 15.9 g/dL Final   03/16/2019 13.9 12.0 - 15.9 g/dL Final   03/16/2019 13.8  12.0 - 15.9 g/dL Final   03/15/2019 13.9 12.0 - 15.9 g/dL Final      HCT Hematocrit   Date Value Ref Range Status   03/17/2019 33.8 (L) 34.0 - 46.6 % Final   03/16/2019 41.9 34.0 - 46.6 % Final   03/16/2019 40.5 34.0 - 46.6 % Final   03/15/2019 39.8 34.0 - 46.6 % Final      Platlets No results found for: LABPLAT   MCV MCV   Date Value Ref Range Status   03/17/2019 89.9 79.0 - 97.0 fL Final   03/16/2019 87.5 79.0 - 97.0 fL Final   03/16/2019 86.7 79.0 - 97.0 fL Final   03/15/2019 85.0 79.0 - 97.0 fL Final          Sodium Sodium   Date Value Ref Range Status   03/17/2019 139 136 - 145 mmol/L Final   03/16/2019 135 (L) 136 - 145 mmol/L Final   03/15/2019 135 (L) 136 - 145 mmol/L Final      Potassium Potassium   Date Value Ref Range Status   03/17/2019 3.8 3.5 - 5.2 mmol/L Final   03/16/2019 3.8 3.5 - 5.2 mmol/L Final   03/15/2019 4.1 3.5 - 5.2 mmol/L Final      Chloride Chloride   Date Value Ref Range Status   03/17/2019 109 (H) 98 - 107 mmol/L Final   03/16/2019 98 98 - 107 mmol/L Final   03/15/2019 93 (L) 98 - 107 mmol/L Final      CO2 CO2   Date Value Ref Range Status   03/17/2019 21.2 (L) 22.0 - 29.0 mmol/L Final   03/16/2019 18.1 (L) 22.0 - 29.0 mmol/L Final   03/15/2019 23.7 22.0 - 29.0 mmol/L Final      BUN BUN   Date Value Ref Range Status   03/17/2019 31 (H) 8 - 23 mg/dL Final   03/16/2019 19 8 - 23 mg/dL Final   03/15/2019 19 8 - 23 mg/dL Final      Creatinine Creatinine   Date Value Ref Range Status   03/17/2019 1.70 (H) 0.57 - 1.00 mg/dL Final   03/16/2019 1.14 (H) 0.57 - 1.00 mg/dL Final   03/15/2019 1.22 (H) 0.57 - 1.00 mg/dL Final      Calcium Calcium   Date Value Ref Range Status   03/17/2019 7.9 (L) 8.6 - 10.5 mg/dL Final   03/16/2019 8.7 8.6 - 10.5 mg/dL Final   03/15/2019 9.5 8.6 - 10.5 mg/dL Final      PO4 No results found for: CAPO4   Albumin Albumin   Date Value Ref Range Status   03/15/2019 4.18 3.50 - 5.20 g/dL Final      Magnesium Magnesium   Date Value Ref Range Status   03/17/2019 3.0 (H) 1.6  - 2.4 mg/dL Final   03/16/2019 1.8 1.6 - 2.4 mg/dL Final   03/15/2019 1.9 1.6 - 2.4 mg/dL Final      Uric Acid No results found for: URICACID     Radiology results :     Imaging Results (last 72 hours)     Procedure Component Value Units Date/Time    CT Lower Extremity Right Without Contrast [[id number]] Updated:  03/16/19 1359    XR Chest 1 View [[id number]] Collected:  03/16/19 0851     Updated:  03/16/19 0854    Narrative:       EXAMINATION: XR CHEST 1 VW-      CLINICAL INDICATION:     fever     TECHNIQUE:  XR CHEST 1 VW-      COMPARISON: NONE      FINDINGS: Left PICC with tip in SVC.  The lungs remain aerated.  Heart and mediastinum contours are unremarkable.  No pleural effusion.  No pneumothorax.   Bony and soft tissue structures are unremarkable.       Impression:       No radiographic evidence of acute cardiac or pulmonary  disease.     This report was finalized on 3/16/2019 8:52 AM by Dr. Harrison Biswas MD.       CT Head Without Contrast [[id number]] Collected:  03/16/19 0851     Updated:  03/16/19 0854    Narrative:          EXAMINATION: CT HEAD WO CONTRAST-      CLINICAL INDICATION:     Confusion/delirium, altered LOC, unexplained;  R73.9-Hyperglycemia, unspecified; R31.29-Other microscopic hematuria     TECHNIQUE: Contiguous axial CT images of the head were obtained without  contrast administration.      Radiation dose reduction techniques were utilized per ALARA protocol.  Automated exposure control was initiated through either or [employer] or  D[employer]Right software packages by  protocol.       [phone] mGy.cm     COMPARISON:  None.       FINDINGS: Generalized cerebral atrophy is present. There is no mass  effect, midline displacement, or hydrocephalus. There are patchy areas  of decreased density within the periventricular white matter which  likely reflect chronic small vessel ischemic changes. There is no CT  evidence of acute infarct or hemorrhage. Bone windows reveal no osseous  abnormalities  or fractures.        Impression:       Atrophy and chronic small vessel ischemic change, but there  are no acute intracranial abnormalities identified.         This report was finalized on 3/16/2019 8:51 AM by Dr. Harrison Biswas MD.               Medications:        amLODIPine 5 mg Oral Q24H   atenolol 25 mg Oral BID   atorvastatin 10 mg Oral Daily   cefepime 2 g Intravenous Q24H   [START ON 3/20/2019] cholecalciferol 50,000 Units Oral Weekly   enoxaparin 40 mg Subcutaneous Q24H   escitalopram 10 mg Oral Nightly   fludrocortisone 0.1 mg Oral Daily   FLUoxetine 20 mg Oral Daily   gabapentin 600 mg Oral BID   insulin aspart 0-7 Units Subcutaneous 4x Daily AC & at Bedtime   insulin detemir 25 Units Subcutaneous Q12H   pantoprazole 40 mg Oral QAM   rOPINIRole 0.5 mg Oral BID   sodium chloride 3 mL Intravenous Q12H   temazepam 30 mg Oral Nightly   vancomycin 15 mg/kg Intravenous Q24H       sodium chloride 100 mL/hr Last Rate: 100 mL/hr (03/17/19 0643)       Assessment/Plan       Hyperglycemia    Confusion      1.  Acute renal failure on chronic kidney disease stage III: Patient baseline creatinine is around 1.1-1.7 looks like most likely patient is intravascular depleted with high sugars will aggressively hydrate the patient check urine lites protein and ultrasound of bilateral kidney    2.  Hyperglycemia: Patient is being treated for that we will continue IV hydration    3.  Confusion: Patient had confusion at admission but now feeling better    4.  Fever: Patient had a blood pressure 102 Tylenol as of antibiotic    Thanks Dr juarez  for the consult. We will follow with you.  I discussed the patient's findings and my recommendations with patient, nursing staff and primary care team    S Crispin Burks MD  03/17/19  8:29 AM            Electronically signed by Santiago Burks MD at 3/17/2019 10:28 AM

## 2019-03-17 NOTE — NURSING NOTE
Patient received from observation unit at this time. She is alert & oriented, in no obvious distress, & not complaints are noted. Instructed on how to use call light, & to ring out for any assistance needed.

## 2019-03-17 NOTE — CONSULTS
Nephrology Consult Note    Referring Provider: DR juarez   Reason for Consultation: Acute renal failure    Subjective Dehydration    History of present illness:  Reny Elena is a 59 y.o. female who presented to [facility] emergency department with chief complaint of nausea vomiting and fever.  Patient states that she thinks she has been dehydrated has been taking Aldactone but no nephrotoxic medication when she came into the hospital her sugar was 494 and the creatinine patient confusion was 1.7.  Patient sodium was slightly low also but patient states she has been having nausea vomiting but has no diarrhea.  Patient's confusion is better  no reviewed chronic NSAIDS use. Patient denies hematuria, dysuria, difficulty passing urine. No prior history of renal stones . No family history of renal disease    Acute Kidney Injury Risk Factors :    Medications : Reviewed  Contrast : no  NSAIDS : no  Volume depletion : yes  Hemodynamic Instability : no    History  Past Medical History:   Diagnosis Date   • Arthritis    • Depression    • Essential hypertension    • Hyperlipidemia    • PAD (peripheral artery disease) (CMS/[facility])    • Type 2 diabetes mellitus (CMS/[facility])    , Past Surgical History:   Procedure Laterality Date   • BACK SURGERY      Post spinal diskectomy, osteophytectomy in one lumbar interspace   • CATARACT EXTRACTION      Left    •  SECTION     • DENTAL PROCEDURE     • HYSTERECTOMY     • INCISION AND DRAINAGE LEG Left 4/15/2017    Procedure: INCISION AND DRAINAGE LOWER EXTREMITY;  Surgeon: Basilio Morris MD;  Location: [facility];  Service:    • INCISION AND DRAINAGE LEG Left 2017    Procedure: Irrigation and Debridment abcess diabetic wound left foot with deep culture;  Surgeon: Basilio Morris MD;  Location: [facility];  Service:    • KNEE ARTHROSCOPY Right    • ORIF TIBIA FRACTURE Right 2018    Procedure: TIBIAL  FRACTURE OPEN REDUCTION INTERNAL FIXATION;  Surgeon: Jung Barragan,  MD;  Location: [facility];  Service: Orthopedics   • TOE AMPUTATION Right     5th   • TUBAL ABDOMINAL LIGATION     , Family History   Problem Relation Age of Onset   • Heart disease Mother    • Cancer Mother    • COPD Mother    • Diabetes Other    • Depression Other    , Social History     Tobacco Use   • Smoking status: Never Smoker   Substance Use Topics   • Alcohol use: No   • Drug use: No   , Medications Prior to Admission   Medication Sig Dispense Refill Last Dose   • amLODIPine (NORVASC) 10 MG tablet Take 1 tablet by mouth Daily. 30 tablet 3 Past Week at Unknown time   • atenolol (TENORMIN) 25 MG tablet Take 25 mg by mouth 2 (Two) Times a Day.   Past Week at Unknown time   • escitalopram (LEXAPRO) 10 MG tablet Take 10 mg by mouth Every Night.   Past Week at Unknown time   • fludrocortisone 0.1 MG tablet Take 0.1 mg by mouth Daily.   Past Week at Unknown time   • FLUoxetine (PROzac) 20 MG capsule Take 20 mg by mouth.   Past Week at Unknown time   • gabapentin (NEURONTIN) 800 MG tablet Take 800 mg by mouth 3 (Three) Times a Day.   Past Week at Unknown time   • HYDROcodone-acetaminophen (NORCO)  MG per tablet Take 1 tablet by mouth Every 4 (Four) Hours As Needed for Mild Pain . Pharmacy directions states every 4 to 6 hours prn pain.   Past Week at Unknown time   • hydrOXYzine (ATARAX) 25 MG tablet Take 25 mg by mouth Daily As Needed for Anxiety.   Past Week at Unknown time   • insulin aspart (novoLOG) 100 UNIT/ML injection Sliding scale TID with meals. Glucose 150-199:2 units. 240-249: 3 units. 250-299- 4 units. 300-349- 5 units. 350-400 :6 units. Over 400: 7un (Patient taking differently: Inject 0-6 Units under the skin into the appropriate area as directed 3 (Three) Times a Day Before Meals. Sliding scale TID with meals. Glucose 150-199:2 units. 240-249: 3 units. 250-299- 4 units. 300-349- 5 units. 350-400 :6 units. Over 400: 7un)  12 Past Week at Unknown time   • Insulin Glargine (BASAGLAR KWIKPEN) 100  UNIT/ML injection pen Inject 25 Units under the skin into the appropriate area as directed 2 (Two) Times a Day. 1 mL 12 Past Week at Unknown time   • pantoprazole (PROTONIX) 40 MG EC tablet Take 40 mg by mouth Daily.   Past Week at Unknown time   • pravastatin (PRAVACHOL) 20 MG tablet Take 20 mg by mouth Daily.   Past Week at Unknown time   • rOPINIRole (REQUIP) 0.5 MG tablet Take 0.5 mg by mouth 2 (Two) Times a Day. Take 1 hour before bedtime.   Past Week at Unknown time   • temazepam (RESTORIL) 30 MG capsule Take 30 mg by mouth Every Night.   Past Week at Unknown time   • tiZANidine (ZANAFLEX) 4 MG tablet Take 4 mg by mouth Every 6 (Six) Hours As Needed for Muscle Spasms.   Past Week at Unknown time   • vitamin D (ERGOCALCIFEROL) 43186 units capsule capsule Take 50,000 Units by mouth 1 (One) Time Per Week. Prior to [facility] Admission, Patient was on: takes on wednesdays   Past Week at Unknown time   • CloNIDine (CATAPRES) 0.1 MG tablet Take 0.1 mg by mouth Every 6 (Six) Hours As Needed for High Blood Pressure (greater than 160/90).   Unknown at Unknown time   • promethazine (PHENERGAN) 25 MG tablet Take 25 mg by mouth Every 6 (Six) Hours As Needed for Nausea or Vomiting.   Unknown at Unknown time   , Scheduled Meds:    amLODIPine 5 mg Oral Q24H   atenolol 25 mg Oral BID   atorvastatin 10 mg Oral Daily   cefepime 2 g Intravenous Q24H   [START ON 3/20/2019] cholecalciferol 50,000 Units Oral Weekly   enoxaparin 40 mg Subcutaneous Q24H   escitalopram 10 mg Oral Nightly   fludrocortisone 0.1 mg Oral Daily   FLUoxetine 20 mg Oral Daily   gabapentin 600 mg Oral BID   insulin aspart 0-7 Units Subcutaneous 4x Daily AC & at Bedtime   insulin detemir 25 Units Subcutaneous Q12H   pantoprazole 40 mg Oral QAM   rOPINIRole 0.5 mg Oral BID   sodium chloride 3 mL Intravenous Q12H   temazepam 30 mg Oral Nightly   vancomycin 15 mg/kg Intravenous Q24H   , Continuous Infusions:    sodium chloride 100 mL/hr Last Rate: 100 mL/hr  (03/17/19 0643)   , PRN Meds:  •  acetaminophen  •  acetaminophen  •  aluminum-magnesium hydroxide-simethicone  •  CloNIDine  •  dextrose  •  dextrose  •  glucagon (human recombinant)  •  haloperidol lactate  •  HYDROcodone-acetaminophen  •  hydrOXYzine  •  LORazepam  •  magnesium sulfate **OR** magnesium sulfate **OR** magnesium sulfate  •  nitroglycerin  •  ondansetron **OR** ondansetron ODT **OR** ondansetron  •  potassium chloride **OR** potassium chloride **OR** potassium chloride  •  sodium chloride  •  sodium chloride  •  tiZANidine and Allergies:  Latex; Morphine and related; Penicillins; Codeine; and Sulfa antibiotics    Review of Systems  More than 10 point review of systems was done. Pertinent items are noted in HPI, all other systems reviewed and negative    Objective     Vital Signs  Temp:  [97.6 °F (36.4 °C)-98.8 °F (37.1 °C)] 97.7 °F (36.5 °C)  Heart Rate:  [] 65  Resp:  [20] 20  BP: ()/(39-67) 156/67    No intake/output data recorded.  I/O last 3 completed shifts:  In: 2454 [P.O.:354; IV Piggyback:2100]  Out: -     Physical Examination:    General Appearance : alert, appears stated age, cooperative   Head : normocephalic, without obvious abnormality and atraumatic  Eyes : conjunctivae and sclerae normal, no icterus, no pallor and PERRLA  Throat : oral mucosa moist  Neck: no adenopathy, suppple, no carotid bruit and no JVD  Lungs : clear to auscultation, respirations regular and unlabored  Heart : regular rhythm & normal rate, normal S1, S2, no murmur, no nahid, no rub Abdomen :  normal bowel sounds, no masses and soft non-tender  Rectal : Deferred  Extremities : moves extremities well, no redness and no edema  Pulses :  palpable and equal bilaterally  Skin : no bleeding, bruising or rash  Neurologic : orientated to person, place, time, grossly no focal deficitis    Laboratory Data :      WBC WBC   Date Value Ref Range Status   03/17/2019 8.84 3.40 - 10.80 10*3/mm3 Final   03/16/2019  11.84 (H) 3.40 - 10.80 10*3/mm3 Final   03/16/2019 11.21 (H) 3.40 - 10.80 10*3/mm3 Final   03/15/2019 13.50 (H) 3.40 - 10.80 10*3/mm3 Final      HGB Hemoglobin   Date Value Ref Range Status   03/17/2019 11.0 (L) 12.0 - 15.9 g/dL Final   03/16/2019 13.9 12.0 - 15.9 g/dL Final   03/16/2019 13.8 12.0 - 15.9 g/dL Final   03/15/2019 13.9 12.0 - 15.9 g/dL Final      HCT Hematocrit   Date Value Ref Range Status   03/17/2019 33.8 (L) 34.0 - 46.6 % Final   03/16/2019 41.9 34.0 - 46.6 % Final   03/16/2019 40.5 34.0 - 46.6 % Final   03/15/2019 39.8 34.0 - 46.6 % Final      Platlets No results found for: LABPLAT   MCV MCV   Date Value Ref Range Status   03/17/2019 89.9 79.0 - 97.0 fL Final   03/16/2019 87.5 79.0 - 97.0 fL Final   03/16/2019 86.7 79.0 - 97.0 fL Final   03/15/2019 85.0 79.0 - 97.0 fL Final          Sodium Sodium   Date Value Ref Range Status   03/17/2019 139 136 - 145 mmol/L Final   03/16/2019 135 (L) 136 - 145 mmol/L Final   03/15/2019 135 (L) 136 - 145 mmol/L Final      Potassium Potassium   Date Value Ref Range Status   03/17/2019 3.8 3.5 - 5.2 mmol/L Final   03/16/2019 3.8 3.5 - 5.2 mmol/L Final   03/15/2019 4.1 3.5 - 5.2 mmol/L Final      Chloride Chloride   Date Value Ref Range Status   03/17/2019 109 (H) 98 - 107 mmol/L Final   03/16/2019 98 98 - 107 mmol/L Final   03/15/2019 93 (L) 98 - 107 mmol/L Final      CO2 CO2   Date Value Ref Range Status   03/17/2019 21.2 (L) 22.0 - 29.0 mmol/L Final   03/16/2019 18.1 (L) 22.0 - 29.0 mmol/L Final   03/15/2019 23.7 22.0 - 29.0 mmol/L Final      BUN BUN   Date Value Ref Range Status   03/17/2019 31 (H) 8 - 23 mg/dL Final   03/16/2019 19 8 - 23 mg/dL Final   03/15/2019 19 8 - 23 mg/dL Final      Creatinine Creatinine   Date Value Ref Range Status   03/17/2019 1.70 (H) 0.57 - 1.00 mg/dL Final   03/16/2019 1.14 (H) 0.57 - 1.00 mg/dL Final   03/15/2019 1.22 (H) 0.57 - 1.00 mg/dL Final      Calcium Calcium   Date Value Ref Range Status   03/17/2019 7.9 (L) 8.6 - 10.5  mg/dL Final   03/16/2019 8.7 8.6 - 10.5 mg/dL Final   03/15/2019 9.5 8.6 - 10.5 mg/dL Final      PO4 No results found for: CAPO4   Albumin Albumin   Date Value Ref Range Status   03/15/2019 4.18 3.50 - 5.20 g/dL Final      Magnesium Magnesium   Date Value Ref Range Status   03/17/2019 3.0 (H) 1.6 - 2.4 mg/dL Final   03/16/2019 1.8 1.6 - 2.4 mg/dL Final   03/15/2019 1.9 1.6 - 2.4 mg/dL Final      Uric Acid No results found for: URICACID     Radiology results :     Imaging Results (last 72 hours)     Procedure Component Value Units Date/Time    CT Lower Extremity Right Without Contrast [[id number]] Updated:  03/16/19 1359    XR Chest 1 View [[id number]] Collected:  03/16/19 0851     Updated:  03/16/19 0854    Narrative:       EXAMINATION: XR CHEST 1 VW-      CLINICAL INDICATION:     fever     TECHNIQUE:  XR CHEST 1 VW-      COMPARISON: NONE      FINDINGS: Left PICC with tip in SVC.  The lungs remain aerated.  Heart and mediastinum contours are unremarkable.  No pleural effusion.  No pneumothorax.   Bony and soft tissue structures are unremarkable.       Impression:       No radiographic evidence of acute cardiac or pulmonary  disease.     This report was finalized on 3/16/2019 8:52 AM by Dr. Harrison Biswas MD.       CT Head Without Contrast [[id number]] Collected:  03/16/19 0851     Updated:  03/16/19 0854    Narrative:          EXAMINATION: CT HEAD WO CONTRAST-      CLINICAL INDICATION:     Confusion/delirium, altered LOC, unexplained;  R73.9-Hyperglycemia, unspecified; R31.29-Other microscopic hematuria     TECHNIQUE: Contiguous axial CT images of the head were obtained without  contrast administration.      Radiation dose reduction techniques were utilized per ALARA protocol.  Automated exposure control was initiated through either or [employer] or  [employer] software packages by  protocol.       [id number] mGy.cm     COMPARISON:  None.       FINDINGS: Generalized cerebral atrophy is present. There is no  mass  effect, midline displacement, or hydrocephalus. There are patchy areas  of decreased density within the periventricular white matter which  likely reflect chronic small vessel ischemic changes. There is no CT  evidence of acute infarct or hemorrhage. Bone windows reveal no osseous  abnormalities or fractures.        Impression:       Atrophy and chronic small vessel ischemic change, but there  are no acute intracranial abnormalities identified.         This report was finalized on 3/16/2019 8:51 AM by Dr. Harrison Biswas MD.               Medications:        amLODIPine 5 mg Oral Q24H   atenolol 25 mg Oral BID   atorvastatin 10 mg Oral Daily   cefepime 2 g Intravenous Q24H   [START ON 3/20/2019] cholecalciferol 50,000 Units Oral Weekly   enoxaparin 40 mg Subcutaneous Q24H   escitalopram 10 mg Oral Nightly   fludrocortisone 0.1 mg Oral Daily   FLUoxetine 20 mg Oral Daily   gabapentin 600 mg Oral BID   insulin aspart 0-7 Units Subcutaneous 4x Daily AC & at Bedtime   insulin detemir 25 Units Subcutaneous Q12H   pantoprazole 40 mg Oral QAM   rOPINIRole 0.5 mg Oral BID   sodium chloride 3 mL Intravenous Q12H   temazepam 30 mg Oral Nightly   vancomycin 15 mg/kg Intravenous Q24H       sodium chloride 100 mL/hr Last Rate: 100 mL/hr (03/17/19 0643)       Assessment/Plan       Hyperglycemia    Confusion      1.  Acute renal failure on chronic kidney disease stage III: Patient baseline creatinine is around 1.1-1.7 looks like most likely patient is intravascular depleted with high sugars will aggressively hydrate the patient check urine lites protein and ultrasound of bilateral kidney    2.  Hyperglycemia: Patient is being treated for that we will continue IV hydration    3.  Confusion: Patient had confusion at admission but now feeling better    4.  Fever: Patient had a blood pressure 102 Tylenol as of antibiotic    Thanks Dr juarez  for the consult. We will follow with you.  I discussed the patient's findings and my  recommendations with patient, nursing staff and primary care team    JASON Burks MD  03/17/19  8:29 AM

## 2019-03-17 NOTE — PROGRESS NOTES
[facility] HOSPITALIST PROGRESS NOTE     Patient Identification:  Name:  Reny Elena  Age:  59 y.o.  Sex:  female  :  1960  MRN:  [id number]  Visit Number:  [id number]  Primary Care Provider:  Yandel Paulson MD    Date of admission: 3/15/2019  Length of stay:  0    I have seen the patient in conjunction with Ashely Tatum PA-C, and I agree with the following statements:  ----------------------------------------------------------------------------------------------------------------------  Subjective     Chief Complaint:   Chief Complaint   Patient presents with   • Vomiting   • Nausea   • Fever     Subjective/Interval History:    Ms. Elena  is a 59 y.o. female who was admitted on 3/15/2019 after presenting to the emergency department of [facility] with complaints of 2-3 days of nausea, vomiting, and fever.  She also reports being confused.  She has a past medical history significant for type II IDDM, hypertension, dyslipidemia, adrenal insufficiency, and reported history of peptic ulcers. She suffered a fracture of her right tib/fib and underwent repair in 2018 at [facility] under the care of Dr. Barragan. She has been receiving wound care as well as IV Rocephin via PICC line at home with home health for the last month.     On presentation to the ED on 3/15/2019, she had fever up to 103.3F, heart rate up to 108, lactic acid 2.6, arterial O2 sat of 87.2%.  Blood pressure did decrease to 70/39.  She received the sepsis protocol fluid bolus. She was hyperglycemic with a glucose of 494.  Anion gap was 18.3.  White blood cell count was elevated at 13.50.  Influenza A and B were negative.  Chest x-ray was unremarkable.  Urinalysis did not show signs of infection.  Lipase was within normal limits.  CT scan of the head showed atrophy and chronic small vessel ischemic change, but no acute intracranial abnormality.  CT scan of the right lower extremity showed  some soft tissue swelling which may represent cellulitis or edema.  Due to worsening renal function, nephrology was consulted and renal ultrasound is unremarkable.  It was felt that she would require a longer hospital stay, hospitalist services were contacted and she was accepted as an inpatient. For complete admission information, please see history and physical.     Today, the patient reports that she is feeling much better since admission. She denies any chest pains or shortness. No further fever or chills. Her mind is now clear and she is alert and oriented.  She denies any further nausea or vomiting.  She has been tolerating a regular diet.  She denies any diarrhea or constipation.  She has not appreciated any bleeding.  She does report that when she was vomiting, it did have a rui color.     ----------------------------------------------------------------------------------------------------------------------  Objective   Current Hospital Meds:    amLODIPine 5 mg Oral Q24H   atorvastatin 10 mg Oral Daily   cefepime 2 g Intravenous Q24H   [START ON 3/20/2019] cholecalciferol 50,000 Units Oral Weekly   escitalopram 5 mg Oral Nightly   fludrocortisone 0.1 mg Oral Daily   FLUoxetine 20 mg Oral Daily   gabapentin 600 mg Oral BID   [START ON 3/18/2019] heparin (porcine) 5,000 Units Subcutaneous Q12H   insulin aspart 0-7 Units Subcutaneous 4x Daily AC & at Bedtime   insulin detemir 15 Units Subcutaneous Q12H   pantoprazole 40 mg Intravenous Q12H   rOPINIRole 0.5 mg Oral BID   sodium chloride 3 mL Intravenous Q12H   temazepam 30 mg Oral Nightly   vancomycin 15 mg/kg Intravenous Q24H       Pharmacy Consult - Pharmacy to dose     sodium chloride 100 mL/hr Last Rate: 100 mL/hr (03/17/19 1735)     ----------------------------------------------------------------------------------------------------------------------  Vital Signs:  Temp:  [97.6 °F (36.4 °C)-99 °F (37.2 °C)] 99 °F (37.2 °C)  Heart Rate:  [51-78] 78  Resp:   [18-20] 18  BP: ()/(40-72) 114/58  Mean Arterial Pressure (Non-Invasive) for the past 24 hrs (Last 3 readings):   Noninvasive MAP (mmHg)   03/17/19 1645 85   03/17/19 1246 97   03/17/19 0729 95     SpO2 Percentage    03/17/19 1645 03/17/19 1840 03/1960   SpO2: 97% 98% 96%     SpO2:  [96 %-99 %] 96 %  on  Flow (L/min):  [2] 2;   Device (Oxygen Therapy): nasal cannula    Body mass index is 22.37 kg/m².  Wt Readings from Last 3 Encounters:   03/17/19 61 kg (134 lb 6.4 oz)   02/14/19 56.7 kg (125 lb)   12/30/18 73.3 kg (161 lb 9.6 oz)      Diet Regular; Consistent Carbohydrate  ----------------------------------------------------------------------------------------------------------------------  Physical exam:  Constitutional:  Well-developed and well-nourished.  No respiratory distress.      HENT:  Head:  Normocephalic and atraumatic.  Mouth:  Moist mucous membranes.    Eyes:  Conjunctivae and EOM are normal. No scleral icterus. No erythema or drainage.  Neck:  Neck supple.  No JVD present.  No carotid bruits.  Cardiovascular:  Normal rate, regular rhythm and normal heart sounds with no murmur.  Pulmonary/Chest:  No respiratory distress, no wheezes, with normal breath sounds and good air movement. Few crackles left base.   Abdominal:  Soft.  Bowel sounds are present x4.  No distension and no tenderness.   Musculoskeletal:  No edema, no tenderness, and no deformity.  No red or swollen joints anywhere.  Anterior right lower extremity wound noted. No surrounding erythema or exudates noted. There is a small ulcer on the right heel with some mild erythema noted.   Neurological:  Alert and oriented to person, place, and time.  No cranial nerve deficit.  No tongue deviation.  No facial droop.  No slurred speech.   Skin:  Skin is warm and dry. No rash noted. No pallor. See MS exam.   Peripheral vascular: No clubbing, no cyanosis, no edema. 2+ pedal pulses bilaterally.   Genitourinary: No tate catheter in place.    ----------------------------------------------------------------------------------------------------------------------  Tele: Sinus rhythm in the 70s and 80s.     EKG: Per my read, initial EKG at admission showed a sinus tachycardia at 107 bpm with left atrial enlargement, old septal myocardial infarction which appears to be new from previous EKGs.  Subtle ST elevation in V2, I, and aVL. T wave inversion in V1 and V2. QTC prolonged at 504ms.    Repeat EKG today per my interpretation shows likely sinus rhythm at 76 bpm.  Continued changes of old septal myocardial infarction.  Significant T wave inversion increased in V1, V2, and V3.  Continued subtle ST elevation in lead I and aVL no longer present in V2 poor R wave progression QTc 490 ms.     I have personally reviewed the EKG/Telemetry strips.  Dr. Sandoval also looked at the EKGs and telemetry strips.  ----------------------------------------------------------------------------------------------------------------------  Results from last 7 days   Lab Units 03/17/19 1954   TROPONIN T ng/mL 0.105*         Results from last 7 days   Lab Units 03/16/19 0018   PH, ARTERIAL pH units 7.419   PO2 ART mm Hg 53.7*   PCO2, ARTERIAL mm Hg 35.6   HCO3 ART mmol/L 22.5     Results from last 7 days   Lab Units 03/17/19 1954 03/17/19  0416 03/16/19  0551 03/16/19  0334 03/16/19 0053   CRP mg/dL  --  1.64*  --   --  0.62*   LACTATE mmol/L  --   --  2.0  --  2.6*   WBC 10*3/mm3  --  8.84  --  11.84* 11.21*   HEMOGLOBIN g/dL 10.4* 11.0*  --  13.9 13.8   HEMATOCRIT % 32.2* 33.8*  --  41.9 40.5   MCV fL  --  89.9  --  87.5 86.7   MCHC g/dL  --  32.5  --  33.2 34.1   PLATELETS 10*3/mm3  --  140  --  147 147     Results from last 7 days   Lab Units 03/17/19  0416 03/16/19 0053 03/15/19  1928   SODIUM mmol/L 139 135* 135*   POTASSIUM mmol/L 3.8 3.8 4.1   MAGNESIUM mg/dL 3.0* 1.8 1.9   CHLORIDE mmol/L 109* 98 93*   CO2 mmol/L 21.2* 18.1* 23.7   BUN mg/dL 31* 19 19   CREATININE mg/dL  1.70* 1.14* 1.22*   EGFR IF NONAFRICN AM mL/min/1.73 31* 48* 45*   CALCIUM mg/dL 7.9* 8.7 9.5   GLUCOSE mg/dL 61* 301* 494*   ALBUMIN g/dL  --   --  4.18   BILIRUBIN mg/dL  --   --  0.6   ALK PHOS U/L  --   --  159*   AST (SGOT) U/L  --   --  18   ALT (SGPT) U/L  --   --  10   Estimated Creatinine Clearance: 33.5 mL/min (A) (by C-G formula based on SCr of 1.7 mg/dL (H)).  Ammonia   Date Value Ref Range Status   03/16/2019 30 11 - 51 umol/L Final       Hemoglobin A1C   Date/Time Value Ref Range Status   03/16/2019 1042 10.30 (H) 4.80 - 5.60 % Final     Glucose   Date/Time Value Ref Range Status   03/17/2019 1636 158 (H) 70 - 130 mg/dL Final   03/17/2019 1107 210 (H) 70 - 130 mg/dL Final   03/17/2019 0721 136 (H) 70 - 130 mg/dL Final   03/17/2019 0520 118 70 - 130 mg/dL Final   03/17/2019 0433 61 (L) 70 - 130 mg/dL Final   03/16/2019 2135 262 (H) 70 - 130 mg/dL Final   03/16/2019 1641 362 (H) 70 - 130 mg/dL Final   03/16/2019 1121 365 (H) 70 - 130 mg/dL Final     Lab Results   Component Value Date    HGBA1C 10.30 (H) 03/16/2019     Lab Results   Component Value Date    TSH 1.413 12/29/2018    FREET4 1.10 12/29/2018       Blood Culture   Date Value Ref Range Status   03/16/2019 No growth at 24 hours  Preliminary   03/16/2019 No growth at 24 hours  Preliminary     Wound Culture   Date Value Ref Range Status   03/16/2019 No growth at 24 hours  Preliminary     Pain Management Panel     Pain Management Panel Latest Ref Rng & Units 3/17/2019 3/15/2019    CREATININE UR mg/dL 29.2 -    AMPHETAMINES SCREEN, URINE Negative - Negative    BARBITURATES SCREEN Negative - Negative    BENZODIAZEPINE SCREEN, URINE Negative - Negative    BUPRENORPHINE Negative - Negative    COCAINE SCREEN, URINE Negative - Negative    METHADONE SCREEN, URINE Negative - Negative        I have personally reviewed the above laboratory results.    ----------------------------------------------------------------------------------------------------------------------  Imaging Results (last 24 hours)     Procedure Component Value Units Date/Time    CT Lower Extremity Right Without Contrast [[id number]] Collected:  03/17/19 0905     Updated:  03/17/19 0909    Narrative:       EXAMINATION: CT LOWER EXTREMITY RIGHT WO CONTRAST-      CLINICAL INDICATION: Pain      TECHNIQUE: Multiple axial CT images were obtained through the right  lower extremity without IV contrast administration.  The axial CT data  set was used to generate high resolution reformatted images in the  coronal and sagittal planes to facilitate diagnostic accuracy and  treatment planning.      Radiation dose reduction techniques were utilized per ALARA protocol.  Automated exposure control was initiated through either or CareDoiTOK or  DoseRight software packages by  protocol.       1527.99700 mGy.cm     COMPARISON: None      FINDINGS: Patient has had a prior tibia and hindfoot hardware fixation.  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 Metallic streak artifact limits evaluation.  Fracture lines are still present. Some soft tissue swelling may  represent cellulitis or edema.       Impression:       Patient has had a prior tibia and hindfoot hardware  fixation. 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 Metallic streak artifact limits  evaluation. Fracture lines are still present. Some soft tissue swelling  may represent cellulitis or edema.     This report was finalized on 3/17/2019 9:07 AM by Dr. Harrison Biswas MD.       US Renal Limited [[id number]] Collected:  03/17/19 0905     Updated:  03/17/19 0907    Narrative:       EXAMINATION: US RENAL LIMITED-      CLINICAL INDICATION:     worsening kidney function; R73.9-Hyperglycemia,  unspecified; R31.29-Other microscopic hematuria    "  TECHNIQUE: Multiplanar gray scale sonographic imaging of the kidneys.      COMPARISON: NONE      FINDINGS: Both kidneys are normal in size and echogenicity. There is no  renal mass, stone, or hydronephrosis seen in either kidney. The right  kidney measures    [id number] cm in length; the left kidney  measures     9.20663497158 cm in length.        Impression:       Unremarkable bilateral renal ultrasound.      This report was finalized on 3/17/2019 9:05 AM by Dr. Harrison Biswas MD.           I have personally reviewed the above radiology results.   ----------------------------------------------------------------------------------------------------------------------  Assessment/Plan     -Severe sepsis, present on admission (tachycardia up to 108, fever up to 103.3, arterial O2 saturation 87.2%, lactic acid 2.6) with acute metabolic encephalopathy and possible cellulitis of the right lower extremity: Blood cultures show no growth after 24 hours. RLE wound culture also with no growth so far. Metabolic encephalopathy has resolved. CRP mildly increased. Continue Cefepime and Vancomycin. Wound care consulted. Repeat CBC and CRP in AM.     -Acute kidney injury on stage III CKD: Baseline creatinine 1.1. Admission creatinine 1.22. Now worsening to 1.7. Nephrology following, appreciate their input. Renal ultrasound reviewed and unremarkable. Continue IV fluids per nephrology. Hold atenolol and lovenox. Will ask pharmacy to review dosing on gabapentin due to worsening renal function and adjust if needed. Repeat CMP in AM.     -EKG changes with elevated troponin: Chest pain free. Follow up on serial troponin levels. Obtain transthoracic echocardiogram. Increase atorvastatin to 40mg daily. Check fasting lipid panel in AM. Unable to give aspirin at this time due to declining H/H with reports of \"rui\" vomitus and history of bleeding peptic ulcers. Atenolol d/c'd due to worsening renal function. Will hold amlodipine and " "start low dose metoprolol 12.5mg BID with holding parameters in AM as BP is borderline low this evening.     -Anemia: Worsening. She reports a history of bleeding peptic ulcers late last year as well as \"rui\" colored vomitus at admission. Vomiting has resolved. Repeat H/H declining further. Hold subcutaneous heparin for now. Place MARIELY/SCUD on the left lower extremity only as the right has a large surgical wound. Check stool for occult blood. Start Protonix 40mg IV BID. Repeat CBC in AM.     -Acute hypoxic respiratory failure, likely secondary to sepsis on admission: CXR unremarkable. O2 saturations have been in the mid to high 90s on 2 L O2 via nasal cannula.  We will wean O2 as tolerated. Monitor continuous O2sat. Incentive spirometry every 4 hours.     -Prolonged QTc: 504ms on admission, improving to 490ms on repeat EKG now. Magnesium elevated after replacement. Potassium normal. Likely related to medications. Hold hydroxyzine and PRN Zofran. Reduce Lexpro to 5mg daily. Continue to monitor.     -Nausea/Vomiting: Resolved. Now tolerating a regular diet. PRN zofran discontinued due to prolonged QTc. Continue IV fluids. Lipase normal on admission. Repeat CMP in AM.     -Type 2 diabetes mellitus, insulin requiring, with both hyper and hypoglycemia: Hypoglycemia noted at 59 last evening. Decrease levemir from 25 to 15 units BID. Continue low dose sliding scale insulin with finger stick blood glucose readings AC and HS. Continue hypoglycemia protocol as needed. Hemoglobin A1c 10.3%. Consult diabetes education. If recurrent hyperglycemia, will advance SSI.     -Elevated alkaline phosphatase: Noted on admission. Repeat CMP in AM and elevated further if needed.     -Hypocalcemia: Albumin normal on admission. Repeat CMP in AM.     -Acute hypermagnesemia: After replacement. Discontinue magnesium replacement protocol for now. Continue to monitor level. If declines again, will restart protocol at a lower dose.     -Essential " hypertension: hypotensive at admission, improving. Hold atenolol due to renal function. Hold amlodipine for now. Add low dose metoprolol as noted above instead due to EKG changes and elevated troponin.     -Dyslipidemia: On atorvastatin, dose increased. AM lipid panel ordered.     -DVT Prophylaxis: Heparin on hold due to declining H/H. Place MARIELY/JAMES on LLE only.     The patient is high risk due to the following diagnoses/reasons: severe sepsis, HORACE, anemia, hypo and hyperglycemia.     I have discussed the patient's assessment and plan with the patient and admitting physician, Dr. Sandoval.     Disposition: Home when medically stable. She has home health services arranged. Consult case management to assist with discharge needs.     ESSENCE Bruno  03/17/19  9:02 PM    ----------------------------------------------------------------------------------------------------------------------    Brief Attending Note     I have seen and examined the patient, performing an independent face-to-face diagnostic evaluation at 10 in the PM.  The plan of care reviewed and developed with the advanced practice clinician (APC):  Ashely Tatum PA-C.    Brief Summary Statement/HPI:  58 yo female who was admitted on 3/15/2019 to the observation unit with complaints of nausea, vomiting, and fever.  sepsis and a possible right heal infection.  Currently, the patient is no longer confused.  She states that she is feeling better.  She denies chest pain and denies trouble breathing.  She also denies coughing and denies nausea, vomiting, diarrhea.  She is eating well with no choking.    Attending Physical Exam:  Constitutional:  Well-developed and well-nourished.  No respiratory distress.      HENT:  Head: Normocephalic and atraumatic.  Mouth:  Moist mucous membranes.    Eyes:  Conjunctivae and EOM are normal.  Pupils are equal, round, and reactive to light.  No scleral icterus.    Neck:  Neck supple.  No JVD present.    Cardiovascular:   Normal rate, regular rhythm and normal heart sounds with no murmur.  Pulmonary/Chest:  No respiratory distress, no wheezes, no crackles, with normal breath sounds and good air movement.  Abdominal:  Soft.  Bowel sounds are normal.  No distension and no tenderness.   Musculoskeletal:  No edema, no tenderness, and no deformity.  No red or swollen joints anywhere.    Neurological:  Alert and oriented to person, place, and time.  No cranial nerve deficit.  No tongue deviation.  No facial droop.  No slurred speech.   Skin: Skin is warm and dry. No rash noted. No pallor.  Right heel has a almost dime sized red crusted lesion with surrounding erythema that does not have a distinct border.  The skin feels a little soft but is not macerated.  There is no discharge and no pain with palpation.  Psychiatric:  Normal mood and affect.  Behavior is normal.  Judgment and thought content normal.   Peripheral vascular:  strong pulses on all 4 extremities with no clubbing, no cyanosis, and no edema.  Genitourinary:    Brief Assessment/Plan:    See above for further detained assessment and plan developed with APC which I have reviewed and/or edited.    Avoid QT prolonging agents.  We will follow her vital signs and labs closely to ensure that the sepsis continues to improve.  The patient does not appear to have pneumonia on imaging and thus we will monitor her oxygen saturations and try to wean her off oxygen for sats at 90% or above.  We await wound culture; the CT scan does not show any osteo-but we are unable to perform an MRI due to the metal that was recently placed in her fractured leg.  We await further opinions from the orthopedic surgeon.  We will continue cefepime and vancomycin.  The patient had some hypoglycemia earlier today and then later on after eating she had hyperglycemia; her home Levemir has been decreased by 10 units but we will continue with a sliding scale NovoLog.  We will continue to monitor the glucose levels  closely at bedside.  Her blood pressures have improved we will continue to monitor these closely.    Vinny Sandoval MD  [facility] Medicine Team  03/18/19  4:39 AM

## 2019-03-17 NOTE — PROGRESS NOTES
PROGRESS NOTE         Patient Identification:  Name:  Reny Elena  Age:  59 y.o.  Sex:  female  :  1960  MRN:  [id number]  Visit Number:  [id number]  Primary Care Provider:  Yandel Paulson MD      ----------------------------------------------------------------------------------------------------------------------  Subjective       Chief Complaints:    Vomiting; Nausea; and Fever        Interval History:      Patient's confusion resolved. Hypotensive overnight, now resolved. CRP slightly worsened at 1.64. WBC normal. Creatinine worsening at 1.70.  Ulcer noted to right foot with redness around it, patient reported it had been there approximately 2 weeks. Right tib/fib incision healing well. CT of right lower extremity revealed some soft tissue swelling likely representing cellulitis.       ----------------------------------------------------------------------------------------------------------------------      Objective       Current [facility] Meds:    amLODIPine 5 mg Oral Q24H   atenolol 25 mg Oral BID   atorvastatin 10 mg Oral Daily   cefepime 2 g Intravenous Q24H   [START ON 3/20/2019] cholecalciferol 50,000 Units Oral Weekly   enoxaparin 40 mg Subcutaneous Q24H   escitalopram 10 mg Oral Nightly   fludrocortisone 0.1 mg Oral Daily   FLUoxetine 20 mg Oral Daily   gabapentin 600 mg Oral BID   insulin aspart 0-7 Units Subcutaneous 4x Daily AC & at Bedtime   insulin detemir 25 Units Subcutaneous Q12H   pantoprazole 40 mg Oral QAM   rOPINIRole 0.5 mg Oral BID   sodium chloride 3 mL Intravenous Q12H   temazepam 30 mg Oral Nightly   vancomycin 15 mg/kg Intravenous Q24H       sodium chloride 100 mL/hr Last Rate: 100 mL/hr (19 1150)     ----------------------------------------------------------------------------------------------------------------------    Vital Signs:  Temp:  [97.6 °F (36.4 °C)-98.6 °F (37 °C)] 98.6 °F (37 °C)  Heart Rate:  [51-74] 71  Resp:  [20] 20  BP:  ()/(39-72) 137/72  Mean Arterial Pressure (Non-Invasive) for the past 24 hrs (Last 3 readings):   Noninvasive MAP (mmHg)   03/17/19 1246 97   03/17/19 0729 95   03/17/19 0430 67     SpO2 Percentage    03/17/19 0430 03/17/19 0729 03/17/19 1246   SpO2: 97% 96% 99%     SpO2:  [96 %-99 %] 99 %  on  Flow (L/min):  [2] 2;   Device (Oxygen Therapy): nasal cannula    Body mass index is 18.33 kg/m².  Wt Readings from Last 3 Encounters:   03/15/19 53.1 kg (117 lb 1 oz)   02/14/19 56.7 kg (125 lb)   12/30/18 73.3 kg (161 lb 9.6 oz)        Intake/Output Summary (Last 24 hours) at 3/17/2019 1315  Last data filed at 3/16/2019 1700  Gross per 24 hour   Intake 118 ml   Output --   Net 118 ml     Diet Regular; Consistent Carbohydrate  ----------------------------------------------------------------------------------------------------------------------    Physical exam:    Constitutional:  Well-developed and well-nourished.  No respiratory distress.      HENT:  Head:  Normocephalic and atraumatic.  Mouth:  Moist mucous membranes.    Eyes:  Conjunctivae and EOM are normal.  No scleral icterus.    Neck:  Neck supple.  No JVD present.    Cardiovascular:  Normal rate, regular rhythm and normal heart sounds with no murmur. No edema.  Pulmonary/Chest:  No respiratory distress, no wheezes, no crackles, with normal breath sounds and good air movement.  Abdominal:  Soft.  Bowel sounds are normal.  No distension and no tenderness.   Musculoskeletal:  RLE in ace wrap from Tib/Fib surgery in December, redness noted to ulcer on right heel.    Neurological:  Confused.  No slurred speech.   Skin:  Skin is warm and dry. No rash noted. No pallor.     ----------------------------------------------------------------------------------------------------------------------  I have personally reviewed the EKG/Telemetry strips   ----------------------------------------------------------------------------------------------------------------------           Results from last 7 days   Lab Units 03/16/19  0018   PH, ARTERIAL pH units 7.419   PO2 ART mm Hg 53.7*   PCO2, ARTERIAL mm Hg 35.6   HCO3 ART mmol/L 22.5     Results from last 7 days   Lab Units 03/17/19  0416 03/16/19  0551 03/16/19  0334 03/16/19  0053   CRP mg/dL 1.64*  --   --  0.62*   LACTATE mmol/L  --  2.0  --  2.6*   WBC 10*3/mm3 8.84  --  11.84* 11.21*   HEMOGLOBIN g/dL 11.0*  --  13.9 13.8   HEMATOCRIT % 33.8*  --  41.9 40.5   MCV fL 89.9  --  87.5 86.7   MCHC g/dL 32.5  --  33.2 34.1   PLATELETS 10*3/mm3 140  --  147 147     Results from last 7 days   Lab Units 03/17/19  0416 03/16/19  0053 03/15/19  1928   SODIUM mmol/L 139 135* 135*   POTASSIUM mmol/L 3.8 3.8 4.1   MAGNESIUM mg/dL 3.0* 1.8 1.9   CHLORIDE mmol/L 109* 98 93*   CO2 mmol/L 21.2* 18.1* 23.7   BUN mg/dL 31* 19 19   CREATININE mg/dL 1.70* 1.14* 1.22*   EGFR IF NONAFRICN AM mL/min/1.73 31* 48* 45*   CALCIUM mg/dL 7.9* 8.7 9.5   GLUCOSE mg/dL 61* 301* 494*   ALBUMIN g/dL  --   --  4.18   BILIRUBIN mg/dL  --   --  0.6   ALK PHOS U/L  --   --  159*   AST (SGOT) U/L  --   --  18   ALT (SGPT) U/L  --   --  10   Estimated Creatinine Clearance: 29.1 mL/min (A) (by C-G formula based on SCr of 1.7 mg/dL (H)).  Ammonia   Date Value Ref Range Status   03/16/2019 30 11 - 51 umol/L Final       Hemoglobin A1C   Date/Time Value Ref Range Status   03/16/2019 1042 10.30 (H) 4.80 - 5.60 % Final     Glucose   Date/Time Value Ref Range Status   03/17/2019 1107 210 (H) 70 - 130 mg/dL Final   03/17/2019 0721 136 (H) 70 - 130 mg/dL Final   03/17/2019 0520 118 70 - 130 mg/dL Final   03/17/2019 0433 61 (L) 70 - 130 mg/dL Final   03/16/2019 2135 262 (H) 70 - 130 mg/dL Final   03/16/2019 1641 362 (H) 70 - 130 mg/dL Final   03/16/2019 1121 365 (H) 70 - 130 mg/dL Final   03/16/2019 0735 411 (H) 70 - 130 mg/dL Final     Lab Results   Component Value Date    HGBA1C 10.30 (H) 03/16/2019     Lab Results   Component Value Date    TSH 1.413 12/29/2018    FREET4 1.10  12/29/2018                      Pain Management Panel     Pain Management Panel Latest Ref Rng & Units 3/15/2019 11/21/2018    CREATININE UR mg/dL - -    AMPHETAMINES SCREEN, URINE Negative Negative Negative    BARBITURATES SCREEN Negative Negative Negative    BENZODIAZEPINE SCREEN, URINE Negative Negative Positive(A)    BUPRENORPHINE Negative Negative Negative    COCAINE SCREEN, URINE Negative Negative Negative    METHADONE SCREEN, URINE Negative Negative Negative          I have personally reviewed the above laboratory results.   ----------------------------------------------------------------------------------------------------------------------  Imaging Results (last 24 hours)     Procedure Component Value Units Date/Time    CT Lower Extremity Right Without Contrast [[id number]] Collected:  03/17/19 0905     Updated:  03/17/19 0909    Narrative:       EXAMINATION: CT LOWER EXTREMITY RIGHT WO CONTRAST-      CLINICAL INDICATION: Pain      TECHNIQUE: Multiple axial CT images were obtained through the right  lower extremity without IV contrast administration.  The axial CT data  set was used to generate high resolution reformatted images in the  coronal and sagittal planes to facilitate diagnostic accuracy and  treatment planning.      Radiation dose reduction techniques were utilized per ALARA protocol.  Automated exposure control was initiated through either or [employer] or  DoseRight software packages by  protocol.       1527.83847 mGy.cm     COMPARISON: None      FINDINGS: Patient has had a prior tibia and hindfoot hardware fixation.  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 Metallic streak artifact limits evaluation.  Fracture lines are still present. Some soft tissue swelling may  represent cellulitis or edema.       Impression:       Patient has had a prior tibia and hindfoot hardware  fixation. Some bony demineralization probably from disuse  osteopenia  predominantly noted of the hindfoot. Metallic streak artifact limits  evaluation. Fracture lines are still present. Some soft tissue swelling  may represent cellulitis or edema.     This report was finalized on 3/17/2019 9:07 AM by Dr. Harrison Biswas MD.       US Renal Limited [[id number]] Collected:  03/17/19 0905     Updated:  03/17/19 0907    Narrative:       EXAMINATION: US RENAL LIMITED-      CLINICAL INDICATION:     worsening kidney function; R73.9-Hyperglycemia,  unspecified; R31.29-Other microscopic hematuria     TECHNIQUE: Multiplanar gray scale sonographic imaging of the kidneys.      COMPARISON: NONE      FINDINGS: Both kidneys are normal in size and echogenicity. There is no  renal mass, stone, or hydronephrosis seen in either kidney. The right  kidney measures    11.270667113531 cm in length; the left kidney  measures     9.28494799146 cm in length.        Impression:       Unremarkable bilateral renal ultrasound.      This report was finalized on 3/17/2019 9:05 AM by Dr. Harrison Biswas MD.           I have personally reviewed the above radiology results.   ----------------------------------------------------------------------------------------------------------------------    Assessment/Plan       Assessment/Plan     ASSESSMENT:    1.  Hyperglycemia  2.  Confusion    PLAN:    Patient's confusion resolved. Hypotensive overnight, now resolved. CRP slightly worsened at 1.64. WBC normal. Creatinine worsening at 1.70.  Ulcer noted to right foot with redness around it, patient reported it had been there approximately 2 weeks. Right tib/fib incision healing well. CT of right lower extremity revealed some soft tissue swelling likely representing cellulitis.     CT of head negative. Blood cultures and wound cultures in progress.       Chest x-ray unremarkable.     In the setting of diabetes, will continue Cefepime 2gm IV Q12H and Vancomycin pharmacy to dose to allow for MRSA and pseudomonal coverage.      Patient tolerated Cefepime and Rocephin the past.     Will continue IV fluids, antibiotic therapy, and glucose management. Nephrology consulted for worsening serum creatinine.     Due to need for prolonged hospital stay patient will be transferred to inpatient status under hospitalist service.       Code Status:   Code Status and Medical Interventions:   Ordered at: 03/15/19 2318     Level Of Support Discussed With:    Patient     Code Status:    CPR     Medical Interventions (Level of Support Prior to Arrest):    Full       DARIUS Moreno  03/17/19  1:15 PM

## 2019-03-17 NOTE — PLAN OF CARE
Problem: Fall Risk (Adult)  Goal: Identify Related Risk Factors and Signs and Symptoms  Outcome: Ongoing (interventions implemented as appropriate)   03/15/19 2345   Fall Risk (Adult)   Related Risk Factors (Fall Risk) confusion/agitation;age-related changes;sleep pattern alteration;slippery/uneven surfaces;environment unfamiliar   Signs and Symptoms (Fall Risk) presence of risk factors       Problem: Patient Care Overview  Goal: Plan of Care Review  Outcome: Ongoing (interventions implemented as appropriate)   03/17/19 0730   Coping/Psychosocial   Plan of Care Reviewed With patient       Problem: Skin Injury Risk (Adult)  Goal: Identify Related Risk Factors and Signs and Symptoms  Outcome: Ongoing (interventions implemented as appropriate)   03/15/19 2345   Skin Injury Risk (Adult)   Related Risk Factors (Skin Injury Risk) advanced age;cognitive impairment       Problem: Diabetes, Type 2 (Adult)  Goal: Signs and Symptoms of Listed Potential Problems Will be Absent, Minimized or Managed (Diabetes, Type 2)  Outcome: Ongoing (interventions implemented as appropriate)   03/15/19 2345   Goal/Outcome Evaluation   Problems Assessed (Type 2 Diabetes) all   Problems Present (Type 2 Diabetes) hyperglycemia

## 2019-03-17 NOTE — PROGRESS NOTES
Pharmacy was consulted to dose cefepime to treat skin infection. Based on the patient's current estimated creatinine clearance of 29 mL/min, cefepime was dosed at 2g every 24 hours. Pharmacy will continue to follow.    Thank you,    Leda Cristobal RPH  03/17/19  8:08 AM

## 2019-03-18 ENCOUNTER — APPOINTMENT (OUTPATIENT)
Dept: CARDIOLOGY | Facility: HOSPITAL | Age: 59
End: 2019-03-18

## 2019-03-18 LAB
ALBUMIN SERPL-MCNC: 2.8 G/DL (ref 3.5–5.2)
ALBUMIN/GLOB SERPL: 1.2 G/DL
ALP SERPL-CCNC: 103 U/L (ref 39–117)
ALT SERPL W P-5'-P-CCNC: 9 U/L (ref 1–33)
ANION GAP SERPL CALCULATED.3IONS-SCNC: 6.9 MMOL/L
AST SERPL-CCNC: 18 U/L (ref 1–32)
BASOPHILS # BLD AUTO: 0.03 10*3/MM3 (ref 0–0.2)
BASOPHILS NFR BLD AUTO: 0.5 % (ref 0–1.5)
BH CV ECHO MEAS - % IVS THICK: -11.2 %
BH CV ECHO MEAS - % LVPW THICK: 38.1 %
BH CV ECHO MEAS - ACS: 1.8 CM
BH CV ECHO MEAS - AI DEC SLOPE: 235.7 CM/SEC^2
BH CV ECHO MEAS - AI MAX PG: 63.9 MMHG
BH CV ECHO MEAS - AI MAX VEL: 399.6 CM/SEC
BH CV ECHO MEAS - AI P1/2T: 496.6 MSEC
BH CV ECHO MEAS - AO MAX PG: 11.5 MMHG
BH CV ECHO MEAS - AO MEAN PG: 5.5 MMHG
BH CV ECHO MEAS - AO ROOT AREA (BSA CORRECTED): 1.6
BH CV ECHO MEAS - AO ROOT AREA: 5.7 CM^2
BH CV ECHO MEAS - AO ROOT DIAM: 2.7 CM
BH CV ECHO MEAS - AO V2 MAX: 169.4 CM/SEC
BH CV ECHO MEAS - AO V2 MEAN: 109 CM/SEC
BH CV ECHO MEAS - AO V2 VTI: 29.9 CM
BH CV ECHO MEAS - BSA(HAYCOCK): 1.7 M^2
BH CV ECHO MEAS - BSA: 1.7 M^2
BH CV ECHO MEAS - BZI_BMI: 22.3 KILOGRAMS/M^2
BH CV ECHO MEAS - BZI_METRIC_HEIGHT: 165.1 CM
BH CV ECHO MEAS - BZI_METRIC_WEIGHT: 60.8 KG
BH CV ECHO MEAS - EDV(CUBED): 104.9 ML
BH CV ECHO MEAS - EDV(MOD-SP4): 44 ML
BH CV ECHO MEAS - EDV(TEICH): 103.2 ML
BH CV ECHO MEAS - EF(CUBED): 60 %
BH CV ECHO MEAS - EF(MOD-SP4): 52.3 %
BH CV ECHO MEAS - EF(TEICH): 51.5 %
BH CV ECHO MEAS - ESV(CUBED): 42 ML
BH CV ECHO MEAS - ESV(MOD-SP4): 21 ML
BH CV ECHO MEAS - ESV(TEICH): 50 ML
BH CV ECHO MEAS - FS: 26.3 %
BH CV ECHO MEAS - IVS/LVPW: 0.97
BH CV ECHO MEAS - IVSD: 0.95 CM
BH CV ECHO MEAS - IVSS: 0.84 CM
BH CV ECHO MEAS - LA DIMENSION: 3.5 CM
BH CV ECHO MEAS - LA/AO: 1.3
BH CV ECHO MEAS - LV DIASTOLIC VOL/BSA (35-75): 26.4 ML/M^2
BH CV ECHO MEAS - LV MASS(C)D: 157.8 GRAMS
BH CV ECHO MEAS - LV MASS(C)DI: 94.6 GRAMS/M^2
BH CV ECHO MEAS - LV MASS(C)S: 117.8 GRAMS
BH CV ECHO MEAS - LV MASS(C)SI: 70.6 GRAMS/M^2
BH CV ECHO MEAS - LV SYSTOLIC VOL/BSA (12-30): 12.6 ML/M^2
BH CV ECHO MEAS - LVIDD: 4.7 CM
BH CV ECHO MEAS - LVIDS: 3.5 CM
BH CV ECHO MEAS - LVLD AP4: 6.8 CM
BH CV ECHO MEAS - LVLS AP4: 6.2 CM
BH CV ECHO MEAS - LVOT AREA (M): 2.3 CM^2
BH CV ECHO MEAS - LVOT AREA: 2.3 CM^2
BH CV ECHO MEAS - LVOT DIAM: 1.7 CM
BH CV ECHO MEAS - LVPWD: 0.98 CM
BH CV ECHO MEAS - LVPWS: 1.4 CM
BH CV ECHO MEAS - MV A MAX VEL: 109.7 CM/SEC
BH CV ECHO MEAS - MV E MAX VEL: 81.6 CM/SEC
BH CV ECHO MEAS - MV E/A: 0.74
BH CV ECHO MEAS - PA ACC SLOPE: 1468 CM/SEC^2
BH CV ECHO MEAS - PA ACC TIME: 0.09 SEC
BH CV ECHO MEAS - PA PR(ACCEL): 37.8 MMHG
BH CV ECHO MEAS - RAP SYSTOLE: 10 MMHG
BH CV ECHO MEAS - RVSP: 33.5 MMHG
BH CV ECHO MEAS - SI(AO): 103 ML/M^2
BH CV ECHO MEAS - SI(CUBED): 37.7 ML/M^2
BH CV ECHO MEAS - SI(MOD-SP4): 13.8 ML/M^2
BH CV ECHO MEAS - SI(TEICH): 31.9 ML/M^2
BH CV ECHO MEAS - SV(AO): 171.8 ML
BH CV ECHO MEAS - SV(CUBED): 63 ML
BH CV ECHO MEAS - SV(MOD-SP4): 23 ML
BH CV ECHO MEAS - SV(TEICH): 53.2 ML
BH CV ECHO MEAS - TR MAX VEL: 242.2 CM/SEC
BILIRUB SERPL-MCNC: <0.2 MG/DL (ref 0.1–1.2)
BUN BLD-MCNC: 28 MG/DL (ref 8–23)
BUN/CREAT SERPL: 20.9 (ref 7–25)
CALCIUM SPEC-SCNC: 7.8 MG/DL (ref 8.6–10.5)
CHLORIDE SERPL-SCNC: 110 MMOL/L (ref 98–107)
CHOLEST SERPL-MCNC: 158 MG/DL (ref 0–200)
CO2 SERPL-SCNC: 21.1 MMOL/L (ref 22–29)
CREAT BLD-MCNC: 1.34 MG/DL (ref 0.57–1)
DEPRECATED RDW RBC AUTO: 43.7 FL (ref 37–54)
EOSINOPHIL # BLD AUTO: 0.26 10*3/MM3 (ref 0–0.4)
EOSINOPHIL NFR BLD AUTO: 4.6 % (ref 0.3–6.2)
ERYTHROCYTE [DISTWIDTH] IN BLOOD BY AUTOMATED COUNT: 13.8 % (ref 12.3–15.4)
GFR SERPL CREATININE-BSD FRML MDRD: 40 ML/MIN/1.73
GLOBULIN UR ELPH-MCNC: 2.4 GM/DL
GLUCOSE BLD-MCNC: 231 MG/DL (ref 65–99)
GLUCOSE BLDC GLUCOMTR-MCNC: 118 MG/DL (ref 70–130)
GLUCOSE BLDC GLUCOMTR-MCNC: 125 MG/DL (ref 70–130)
GLUCOSE BLDC GLUCOMTR-MCNC: 174 MG/DL (ref 70–130)
GLUCOSE BLDC GLUCOMTR-MCNC: 278 MG/DL (ref 70–130)
GLUCOSE BLDC GLUCOMTR-MCNC: 77 MG/DL (ref 70–130)
HCT VFR BLD AUTO: 31.1 % (ref 34–46.6)
HDLC SERPL-MCNC: 43 MG/DL (ref 40–60)
HGB BLD-MCNC: 10 G/DL (ref 12–15.9)
IMM GRANULOCYTES # BLD AUTO: 0 10*3/MM3 (ref 0–0.05)
IMM GRANULOCYTES NFR BLD AUTO: 0 % (ref 0–0.5)
LDLC SERPL CALC-MCNC: 70 MG/DL (ref 0–100)
LDLC/HDLC SERPL: 1.64 {RATIO}
LYMPHOCYTES # BLD AUTO: 1.47 10*3/MM3 (ref 0.7–3.1)
LYMPHOCYTES NFR BLD AUTO: 26 % (ref 19.6–45.3)
MAGNESIUM SERPL-MCNC: 2.3 MG/DL (ref 1.6–2.4)
MAXIMAL PREDICTED HEART RATE: 159 BPM
MCH RBC QN AUTO: 29 PG (ref 26.6–33)
MCHC RBC AUTO-ENTMCNC: 32.2 G/DL (ref 31.5–35.7)
MCV RBC AUTO: 90.1 FL (ref 79–97)
MONOCYTES # BLD AUTO: 0.38 10*3/MM3 (ref 0.1–0.9)
MONOCYTES NFR BLD AUTO: 6.7 % (ref 5–12)
NEUTROPHILS # BLD AUTO: 3.51 10*3/MM3 (ref 1.4–7)
NEUTROPHILS NFR BLD AUTO: 62.2 % (ref 42.7–76)
PLATELET # BLD AUTO: 131 10*3/MM3 (ref 140–450)
PMV BLD AUTO: 10.9 FL (ref 6–12)
POTASSIUM BLD-SCNC: 4 MMOL/L (ref 3.5–5.2)
PROT SERPL-MCNC: 5.2 G/DL (ref 6–8.5)
RBC # BLD AUTO: 3.45 10*6/MM3 (ref 3.77–5.28)
SODIUM BLD-SCNC: 138 MMOL/L (ref 136–145)
STRESS TARGET HR: 135 BPM
TRIGL SERPL-MCNC: 223 MG/DL (ref 0–150)
TROPONIN T SERPL-MCNC: 0.12 NG/ML (ref 0–0.03)
TROPONIN T SERPL-MCNC: 0.12 NG/ML (ref 0–0.03)
TROPONIN T SERPL-MCNC: 0.15 NG/ML (ref 0–0.03)
VANCOMYCIN TROUGH SERPL-MCNC: 13.4 MCG/ML (ref 5–20)
VLDLC SERPL-MCNC: 44.6 MG/DL
WBC NRBC COR # BLD: 5.65 10*3/MM3 (ref 3.4–10.8)

## 2019-03-18 PROCEDURE — 85025 COMPLETE CBC W/AUTO DIFF WBC: CPT | Performed by: NURSE PRACTITIONER

## 2019-03-18 PROCEDURE — 63710000001 INSULIN ASPART PER 5 UNITS: Performed by: NURSE PRACTITIONER

## 2019-03-18 PROCEDURE — 84484 ASSAY OF TROPONIN QUANT: CPT | Performed by: PHYSICIAN ASSISTANT

## 2019-03-18 PROCEDURE — 80061 LIPID PANEL: CPT | Performed by: PHYSICIAN ASSISTANT

## 2019-03-18 PROCEDURE — 25010000002 VANCOMYCIN 5 G RECONSTITUTED SOLUTION 5,000 MG VIAL

## 2019-03-18 PROCEDURE — 80053 COMPREHEN METABOLIC PANEL: CPT | Performed by: PHYSICIAN ASSISTANT

## 2019-03-18 PROCEDURE — 82962 GLUCOSE BLOOD TEST: CPT

## 2019-03-18 PROCEDURE — 25010000002 VANCOMYCIN 5 G RECONSTITUTED SOLUTION 5,000 MG VIAL: Performed by: INTERNAL MEDICINE

## 2019-03-18 PROCEDURE — 63710000001 INSULIN DETEMIR PER 5 UNITS: Performed by: PHYSICIAN ASSISTANT

## 2019-03-18 PROCEDURE — 93306 TTE W/DOPPLER COMPLETE: CPT | Performed by: INTERNAL MEDICINE

## 2019-03-18 PROCEDURE — 80202 ASSAY OF VANCOMYCIN: CPT

## 2019-03-18 PROCEDURE — G0108 DIAB MANAGE TRN  PER INDIV: HCPCS

## 2019-03-18 PROCEDURE — 25010000002 CEFEPIME 2 G/NS 100 ML SOLUTION: Performed by: NURSE PRACTITIONER

## 2019-03-18 PROCEDURE — 99253 IP/OBS CNSLTJ NEW/EST LOW 45: CPT | Performed by: INTERNAL MEDICINE

## 2019-03-18 PROCEDURE — 99233 SBSQ HOSP IP/OBS HIGH 50: CPT | Performed by: INTERNAL MEDICINE

## 2019-03-18 PROCEDURE — 93306 TTE W/DOPPLER COMPLETE: CPT

## 2019-03-18 PROCEDURE — 83735 ASSAY OF MAGNESIUM: CPT | Performed by: NURSE PRACTITIONER

## 2019-03-18 RX ORDER — MULTIVITAMIN
1 TABLET ORAL DAILY
Status: DISCONTINUED | OUTPATIENT
Start: 2019-03-19 | End: 2019-03-20 | Stop reason: HOSPADM

## 2019-03-18 RX ORDER — CLOPIDOGREL BISULFATE 75 MG/1
75 TABLET ORAL DAILY
Status: DISCONTINUED | OUTPATIENT
Start: 2019-03-18 | End: 2019-03-20 | Stop reason: HOSPADM

## 2019-03-18 RX ADMIN — INSULIN DETEMIR 15 UNITS: 100 INJECTION, SOLUTION SUBCUTANEOUS at 08:30

## 2019-03-18 RX ADMIN — HYDROCODONE BITARTRATE AND ACETAMINOPHEN 1 TABLET: 10; 325 TABLET ORAL at 15:23

## 2019-03-18 RX ADMIN — METOPROLOL TARTRATE 12.5 MG: 25 TABLET, FILM COATED ORAL at 22:55

## 2019-03-18 RX ADMIN — PANTOPRAZOLE SODIUM 40 MG: 40 INJECTION, POWDER, FOR SOLUTION INTRAVENOUS at 21:26

## 2019-03-18 RX ADMIN — INSULIN ASPART 4 UNITS: 100 INJECTION, SOLUTION INTRAVENOUS; SUBCUTANEOUS at 11:57

## 2019-03-18 RX ADMIN — SODIUM CHLORIDE 100 ML/HR: 9 INJECTION, SOLUTION INTRAVENOUS at 08:28

## 2019-03-18 RX ADMIN — SODIUM CHLORIDE, PRESERVATIVE FREE 3 ML: 5 INJECTION INTRAVENOUS at 20:13

## 2019-03-18 RX ADMIN — ATORVASTATIN CALCIUM 40 MG: 40 TABLET, FILM COATED ORAL at 08:28

## 2019-03-18 RX ADMIN — PANTOPRAZOLE SODIUM 40 MG: 40 INJECTION, POWDER, FOR SOLUTION INTRAVENOUS at 08:28

## 2019-03-18 RX ADMIN — FLUOXETINE 20 MG: 20 CAPSULE ORAL at 08:29

## 2019-03-18 RX ADMIN — FLUDROCORTISONE ACETATE 0.1 MG: 0.1 TABLET ORAL at 08:28

## 2019-03-18 RX ADMIN — ROPINIROLE HYDROCHLORIDE 0.5 MG: 0.25 TABLET, FILM COATED ORAL at 08:29

## 2019-03-18 RX ADMIN — METOPROLOL TARTRATE 12.5 MG: 25 TABLET, FILM COATED ORAL at 08:28

## 2019-03-18 RX ADMIN — ROPINIROLE HYDROCHLORIDE 0.5 MG: 0.25 TABLET, FILM COATED ORAL at 21:26

## 2019-03-18 RX ADMIN — VANCOMYCIN HYDROCHLORIDE 1000 MG: 5 INJECTION, POWDER, LYOPHILIZED, FOR SOLUTION INTRAVENOUS at 21:27

## 2019-03-18 RX ADMIN — SODIUM CHLORIDE, PRESERVATIVE FREE 3 ML: 5 INJECTION INTRAVENOUS at 08:29

## 2019-03-18 RX ADMIN — CLOPIDOGREL 75 MG: 75 TABLET, FILM COATED ORAL at 18:04

## 2019-03-18 RX ADMIN — TEMAZEPAM 30 MG: 15 CAPSULE ORAL at 20:13

## 2019-03-18 RX ADMIN — GABAPENTIN 600 MG: 300 CAPSULE ORAL at 20:13

## 2019-03-18 RX ADMIN — CLONIDINE HYDROCHLORIDE 0.1 MG: 0.1 TABLET ORAL at 15:23

## 2019-03-18 RX ADMIN — VANCOMYCIN HYDROCHLORIDE 750 MG: 5 INJECTION, POWDER, LYOPHILIZED, FOR SOLUTION INTRAVENOUS at 04:59

## 2019-03-18 RX ADMIN — ESCITALOPRAM OXALATE 5 MG: 10 TABLET ORAL at 21:26

## 2019-03-18 RX ADMIN — GABAPENTIN 600 MG: 300 CAPSULE ORAL at 08:34

## 2019-03-18 NOTE — PROGRESS NOTES
Nephrology Progress Note      Subjective     Patient feeling much better denies any nausea vomiting or diarrhea has some confusion    Objective       Vitals signs :     Temp:  [97.7 °F (36.5 °C)-99 °F (37.2 °C)] 98.6 °F (37 °C)  Heart Rate:  [65-78] 68  Resp:  [17-20] 17  BP: (114-156)/(58-72) 120/58    I/O last 3 completed shifts:  In: 1640 [P.O.:1640]  Out: -   No intake/output data recorded.    Physical Exam:    General Appearance : He is alert answering most of the question  Head  :  normocephalic, without obvious abnormality and atraumatic  Eyes  :  no icterus, no pallor and PERRLA  Neck  :  no adenopathy, suppple, no carotid bruit, no JVD   Lungs : clear to auscultation, respirations regular and unlabored  Heart :  regular rhythm & normal rate, normal S1, S2 and no murmur, no nahid, no rub  Abdomen : normal bowel sounds, no masses,  soft non-tender  Extremities : moves extremities well, TRACE  edema, no cyanosis and no redness  Skin :  no bleeding, bruising or rash  Neurologic : Mildly confused      Laboratory Data :         WBC WBC   Date Value Ref Range Status   03/18/2019 5.65 3.40 - 10.80 10*3/mm3 Final   03/17/2019 8.84 3.40 - 10.80 10*3/mm3 Final   03/16/2019 11.84 (H) 3.40 - 10.80 10*3/mm3 Final   03/16/2019 11.21 (H) 3.40 - 10.80 10*3/mm3 Final   03/15/2019 13.50 (H) 3.40 - 10.80 10*3/mm3 Final      HGB Hemoglobin   Date Value Ref Range Status   03/18/2019 10.0 (L) 12.0 - 15.9 g/dL Final   03/17/2019 10.4 (L) 12.0 - 15.9 g/dL Final   03/17/2019 11.0 (L) 12.0 - 15.9 g/dL Final   03/16/2019 13.9 12.0 - 15.9 g/dL Final   03/16/2019 13.8 12.0 - 15.9 g/dL Final   03/15/2019 13.9 12.0 - 15.9 g/dL Final      HCT Hematocrit   Date Value Ref Range Status   03/18/2019 31.1 (L) 34.0 - 46.6 % Final   03/17/2019 32.2 (L) 34.0 - 46.6 % Final   03/17/2019 33.8 (L) 34.0 - 46.6 % Final   03/16/2019 41.9 34.0 - 46.6 % Final   03/16/2019 40.5 34.0 - 46.6 % Final   03/15/2019 39.8 34.0 - 46.6 % Final      Platlets No  results found for: LABPLAT   MCV MCV   Date Value Ref Range Status   03/18/2019 90.1 79.0 - 97.0 fL Final   03/17/2019 89.9 79.0 - 97.0 fL Final   03/16/2019 87.5 79.0 - 97.0 fL Final   03/16/2019 86.7 79.0 - 97.0 fL Final   03/15/2019 85.0 79.0 - 97.0 fL Final          Sodium Sodium   Date Value Ref Range Status   03/18/2019 138 136 - 145 mmol/L Final   03/17/2019 139 136 - 145 mmol/L Final   03/16/2019 135 (L) 136 - 145 mmol/L Final   03/15/2019 135 (L) 136 - 145 mmol/L Final      Potassium Potassium   Date Value Ref Range Status   03/18/2019 4.0 3.5 - 5.2 mmol/L Final   03/17/2019 3.8 3.5 - 5.2 mmol/L Final   03/16/2019 3.8 3.5 - 5.2 mmol/L Final   03/15/2019 4.1 3.5 - 5.2 mmol/L Final      Chloride Chloride   Date Value Ref Range Status   03/18/2019 110 (H) 98 - 107 mmol/L Final   03/17/2019 109 (H) 98 - 107 mmol/L Final   03/16/2019 98 98 - 107 mmol/L Final   03/15/2019 93 (L) 98 - 107 mmol/L Final      CO2 CO2   Date Value Ref Range Status   03/18/2019 21.1 (L) 22.0 - 29.0 mmol/L Final   03/17/2019 21.2 (L) 22.0 - 29.0 mmol/L Final   03/16/2019 18.1 (L) 22.0 - 29.0 mmol/L Final   03/15/2019 23.7 22.0 - 29.0 mmol/L Final      BUN BUN   Date Value Ref Range Status   03/18/2019 28 (H) 8 - 23 mg/dL Final   03/17/2019 31 (H) 8 - 23 mg/dL Final   03/16/2019 19 8 - 23 mg/dL Final   03/15/2019 19 8 - 23 mg/dL Final      Creatinine Creatinine   Date Value Ref Range Status   03/18/2019 1.34 (H) 0.57 - 1.00 mg/dL Final   03/17/2019 1.70 (H) 0.57 - 1.00 mg/dL Final   03/16/2019 1.14 (H) 0.57 - 1.00 mg/dL Final   03/15/2019 1.22 (H) 0.57 - 1.00 mg/dL Final      Calcium Calcium   Date Value Ref Range Status   03/18/2019 7.8 (L) 8.6 - 10.5 mg/dL Final   03/17/2019 7.9 (L) 8.6 - 10.5 mg/dL Final   03/16/2019 8.7 8.6 - 10.5 mg/dL Final   03/15/2019 9.5 8.6 - 10.5 mg/dL Final      PO4 No results found for: CAPO4   Albumin Albumin   Date Value Ref Range Status   03/18/2019 2.80 (L) 3.50 - 5.20 g/dL Final   03/15/2019 4.18 3.50 -  5.20 g/dL Final      Magnesium Magnesium   Date Value Ref Range Status   03/18/2019 2.3 1.6 - 2.4 mg/dL Final   03/17/2019 3.0 (H) 1.6 - 2.4 mg/dL Final   03/16/2019 1.8 1.6 - 2.4 mg/dL Final   03/15/2019 1.9 1.6 - 2.4 mg/dL Final      Uric Acid No results found for: URICACID       Medications :       atorvastatin 40 mg Oral Daily   cefepime 2 g Intravenous Q24H   [START ON 3/20/2019] cholecalciferol 50,000 Units Oral Weekly   escitalopram 5 mg Oral Nightly   fludrocortisone 0.1 mg Oral Daily   FLUoxetine 20 mg Oral Daily   gabapentin 600 mg Oral BID   insulin aspart 0-7 Units Subcutaneous 4x Daily AC & at Bedtime   insulin detemir 15 Units Subcutaneous Q12H   metoprolol tartrate 12.5 mg Oral Q12H   pantoprazole 40 mg Intravenous Q12H   rOPINIRole 0.5 mg Oral BID   sodium chloride 3 mL Intravenous Q12H   temazepam 30 mg Oral Nightly   vancomycin 15 mg/kg Intravenous Q24H       Pharmacy Consult - Pharmacy to dose     sodium chloride 100 mL/hr Last Rate: 100 mL/hr (03/17/19 9885)       Radiology Results :     Imaging Results (last 72 hours)     Procedure Component Value Units Date/Time    CT Lower Extremity Right Without Contrast [[id number]] Collected:  03/17/19 0905     Updated:  03/17/19 0909    Narrative:       EXAMINATION: CT LOWER EXTREMITY RIGHT WO CONTRAST-      CLINICAL INDICATION: Pain      TECHNIQUE: Multiple axial CT images were obtained through the right  lower extremity without IV contrast administration.  The axial CT data  set was used to generate high resolution reformatted images in the  coronal and sagittal planes to facilitate diagnostic accuracy and  treatment planning.      Radiation dose reduction techniques were utilized per ALARA protocol.  Automated exposure control was initiated through either or [employer] or  DoseRight software packages by  protocol.       1527.82027 mGy.cm     COMPARISON: None      FINDINGS: Patient has had a prior tibia and hindfoot hardware fixation.  Some bony  demineralization probably from disuse osteopenia predominantly  noted of the hindfoot. Metallic streak artifact limits evaluation.  Fracture lines are still present. Some soft tissue swelling may  represent cellulitis or edema.       Impression:       Patient has had a prior tibia and hindfoot hardware  fixation. 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 Metallic streak artifact limits  evaluation. Fracture lines are still present. Some soft tissue swelling  may represent cellulitis or edema.     This report was finalized on 3/17/2019 9:07 AM by Dr. Harrison Biswas MD.       US Renal Limited [[id number]] Collected:  03/17/19 0905     Updated:  03/17/19 0907    Narrative:       EXAMINATION: US RENAL LIMITED-      CLINICAL INDICATION:     worsening kidney function; R73.9-Hyperglycemia,  unspecified; R31.29-Other microscopic hematuria     TECHNIQUE: Multiplanar gray scale sonographic imaging of the kidneys.      COMPARISON: NONE      FINDINGS: Both kidneys are normal in size and echogenicity. There is no  renal mass, stone, or hydronephrosis seen in either kidney. The right  kidney measures    11.928893681056 cm in length; the left kidney  measures     9.69345599068 cm in length.        Impression:       Unremarkable bilateral renal ultrasound.      This report was finalized on 3/17/2019 9:05 AM by Dr. Harrison Biswas MD.       XR Chest 1 View [[id number]] Collected:  03/16/19 0851     Updated:  03/16/19 0854    Narrative:       EXAMINATION: XR CHEST 1 VW-      CLINICAL INDICATION:     fever     TECHNIQUE:  XR CHEST 1 VW-      COMPARISON: NONE      FINDINGS: Left PICC with tip in SVC.  The lungs remain aerated.  Heart and mediastinum contours are unremarkable.  No pleural effusion.  No pneumothorax.   Bony and soft tissue structures are unremarkable.       Impression:       No radiographic evidence of acute cardiac or pulmonary  disease.     This report was finalized on  3/16/2019 8:52 AM by Dr. Harrison Biswas MD.       CT Head Without Contrast [[id number]] Collected:  03/16/19 0851     Updated:  03/16/19 0854    Narrative:          EXAMINATION: CT HEAD WO CONTRAST-      CLINICAL INDICATION:     Confusion/delirium, altered LOC, unexplained;  R73.9-Hyperglycemia, unspecified; R31.29-Other microscopic hematuria     TECHNIQUE: Contiguous axial CT images of the head were obtained without  contrast administration.      Radiation dose reduction techniques were utilized per ALARA protocol.  Automated exposure control was initiated through either or [employer] or  DoseRight software packages by  protocol.       [id number] mGy.cm     COMPARISON:  None.       FINDINGS: Generalized cerebral atrophy is present. There is no mass  effect, midline displacement, or hydrocephalus. There are patchy areas  of decreased density within the periventricular white matter which  likely reflect chronic small vessel ischemic changes. There is no CT  evidence of acute infarct or hemorrhage. Bone windows reveal no osseous  abnormalities or fractures.        Impression:       Atrophy and chronic small vessel ischemic change, but there  are no acute intracranial abnormalities identified.         This report was finalized on 3/16/2019 8:51 AM by Dr. Harrison Biswas MD.               Assessment/Plan       1.  Acute renal failure on chronic kidney disease stage III: Patient baseline creatinine is around 1.1- went up to 1.7 now 1.3 patient has proteinuria of 4 g so will repeat 24-hour urine protein and decide further looks like most likely patient is intravascular depleted with high sugars ultrasound of the kidneys are normal will DC IV fluids as the patient's kidney function is much better    2.  Hyperglycemia:  Getting better     3.  Confusion: Patient had confusion at admission but now feeling better     4.  Fever:  Better , patient is currently continued on cefepime and vancomycin         Discussed with  patient      S Crispin Burks MD  03/18/19  7:06 AM

## 2019-03-18 NOTE — PLAN OF CARE
Problem: Fall Risk (Adult)  Goal: Identify Related Risk Factors and Signs and Symptoms  Outcome: Ongoing (interventions implemented as appropriate)   03/15/19 2345   Fall Risk (Adult)   Related Risk Factors (Fall Risk) confusion/agitation;age-related changes;sleep pattern alteration;slippery/uneven surfaces;environment unfamiliar   Signs and Symptoms (Fall Risk) presence of risk factors      03/15/19 2345   Fall Risk (Adult)   Related Risk Factors (Fall Risk) confusion/agitation;age-related changes;sleep pattern alteration;slippery/uneven surfaces;environment unfamiliar   Signs and Symptoms (Fall Risk) presence of risk factors     Goal: Absence of Fall  Outcome: Ongoing (interventions implemented as appropriate)      Problem: Patient Care Overview  Goal: Plan of Care Review  Outcome: Ongoing (interventions implemented as appropriate)      Problem: Skin Injury Risk (Adult)  Goal: Identify Related Risk Factors and Signs and Symptoms  Outcome: Ongoing (interventions implemented as appropriate)    Goal: Skin Health and Integrity  Outcome: Ongoing (interventions implemented as appropriate)      Problem: Diabetes, Type 2 (Adult)  Goal: Signs and Symptoms of Listed Potential Problems Will be Absent, Minimized or Managed (Diabetes, Type 2)  Outcome: Ongoing (interventions implemented as appropriate)

## 2019-03-18 NOTE — CONSULTS
Patient resting comfortably in bed, alert and oriented.  Made patient aware of current HgB A1C of 10.3. Patient reports she had been sick before showing up in the ER reporting vomiting.  Counseled patient on sick day rules and importance of frequent blood glucose monitoring on those days along with calling doctor for blood glucose over 300  Counseled patient on diabetes basic handout which discusses exactly what diabetes is and how it affects the body.  Counseled patient on complications of hyperglycemia and ways to prevent it.  Counseled patient on hypoglycemia and treatment by using the rule of 15.  Counseled patient on the importance of checking blood glucose levels frequently and keeping a log to take to all MD appointments.  Counseled patient on how to care for themselves during sick day.  Stressed the importance of healthy eating with a limit of carbohydrates to 180 per day.  Counseled on importance of complying with medications as ordered by provider.  Counseled patient to seek medical attention if blood glucose above 300.  Patient verbalizes understanding of all information given. Encouraged patient to attend an outpatient diabetes smart class or attend diabetes support  group.  Left time and dates of classes with patient along with my name and contact information, will continue to monitor patient as needed.

## 2019-03-18 NOTE — PROGRESS NOTES
Nutrition Services    Patient Name:  Reny Elena  YOB: 1960  MRN: [id number]  Admit Date:  3/15/2019    Rec MVI to promote healing of skin    Electronically signed by:  Kristin Daniel RD  03/18/19 2:12 PM

## 2019-03-18 NOTE — PROGRESS NOTES
Discharge Planning Assessment   Jose Alfredo     Patient Name: Reny Elena  MRN: [id number]  Today's Date: 3/18/2019    Admit Date: 3/15/2019    Discharge Needs Assessment     Row Name 03/18/19 5505       Living Environment    Lives With  alone    Current Living Arrangements  home/apartment/condo    Primary Care Provided by  other (see comments);self    Family Caregiver if Needed  none    Quality of Family Relationships  helpful;involved;supportive    Able to Return to Prior Arrangements  yes        Discharge Plan     Row Name 03/18/19 1436       Plan    Plan  SS spoke with pt on this date. Pt lives at home alone with aide services. Pt has a wheelchair, rollator, bedside commode, and shower chair. Pt utilizes [facility]. HH to be contacted at at discharge. SS will follow.     Patient/Family in Agreement with Plan  yes          Demographic Summary     Row Name 03/18/19 1319       General Information    Referral Source  nursing    Reason for Consult  discharge planning    Preferred Language  English            Charu Kemp

## 2019-03-18 NOTE — CONSULTS
Inpatient Cardiology Consult  Consult performed by: Crescencio Helton MD  Consult ordered by: Mark Crawford DO        Date of Admit: 3/15/2019  Date of Consult: 03/18/19  No ref. provider found  Reny Elena  1960  Consulting Physician[facility] Cres[facility]    Cardiology consultation    Reason for consultation: Elevated Troponin and Abnormal EKG    Assessment:  1. Elevated Troponin, Peaked at 0.146, trended down to 0.118. Suspected acute non-ST elevation MI.  2. Abnormal EKG, T wave inversion concerning for anterior wall ischemia.  3. Essential Hypertension, controlled  4. Acute kidney injury, On Stage III CKD, Creatinine 1.34, Nephrology managing  5. Sepsis, Managed by the primary team  6. Anemia with a drop in H/H from 13.9 to 10.0 since 3/16/19, stool for occult blood pending, Hemoglobin 10.0  7. Acute hypoxic respiratory failure,resolved.  8. Prolonged QTc, 490 ms.  9. Diabetes Mellitus, type 2  10. Dyslipidemia, on statin therapy      Recommendations:  1. Move to telemetry floor for closer observation.  2. Will continue to trend cardiac enzymes.  3. Agree with echocardiogram to evaluate LV function and tailor therapy accordingly   4. Check stools for occult blood  5. Start Plavix 75 mg PO daily, continue to monitor hemoglobin and hematocrit closely. Will avoid aspirin for now due to recent history of peptic ulcer.   6. Will evaluate elevated troponin and ischemic changes on EKG further with a lexiscan sestamibi in the AM.  7. Will keep her NPO after midnight  8. Continue with atorvastatin and metoprolol      Subjective       History of Present Illness    Reny Elena is a 59 y.o. female with past medical history significant for hypertension, dyslipidemia, diabetes mellitus type 2 insulin dependent, chronic kidney disease, peptic ulcer disease and right tib/fib fracture status post repair. She was admitted to [facility] on 3/15/19 with complaints of nausea, vomiting and fever.  Initial evaluation in the ED revealed a fever of 103.3 F, hypotension and hypoxemia. Chest x-ray was unremarkable. White blood cell count was 13.5 and creatinine was 1.22. She was diagnosed with sepsis and was admitted for further evaluation. During admission she developed acute kidney injury, creatinine 1.7, nephrology as consulted.  Troponin on 3/17/19 was 0.105 and peaked at 0.146. It has trended down to 0.118. EKG revealed changes concerning for ischemia, hence cardiology was consulted. Upon evaluation today, patient denies chest pain and shortness of breath. She reports the fever, abdominal pain and vomiting has resolved.Overall she reports felling better. She is not a smoker. She has a history of peptic ulcers that was diagnosed in 2017. Her last echocardiogram on 11/22/18 revealed an EF of 56-60% with mild mitral and tricuspid regurgitation and mild aortic stenosis. Echocardiogram has been ordered by the primary team and stools for occult blood are pending.                                                   Cardiac risk factors:diabetes mellitus and hypertension    Last Echo: 11/22/18  · Left ventricular systolic function is low normal. Estimated EF appears to be in the range of 56 - 60%.  · Left ventricular diastolic dysfunction (grade I a) consistent with impaired relaxation.  · Mild tricuspid valve regurgitation is present.  · Mild mitral valve regurgitation is present  · Mild aortic valve stenosis is present.  · Right ventricular cavity is borderline dilated.  · There is no evidence of pericardial effusion.    Past Medical History:   Diagnosis Date   • Arthritis    • Closed fracture of right fibula and tibia 12/25/2018   • Depression    • Diabetic ulcer of left foot associated with type 2 diabetes mellitus (CMS/[facility]) 6/23/2017   • Diastolic dysfunction    • Essential hypertension    • Hyperlipidemia    • Iron deficiency anemia 12/30/2018   • PAD (peripheral artery disease) (CMS/[facility])    • Type 2 diabetes  mellitus with hyperglycemia, with long-term current use of insulin (CMS/[facility]) 2018     Past Surgical History:   Procedure Laterality Date   • BACK SURGERY      Post spinal diskectomy, osteophytectomy in one lumbar interspace   • CATARACT EXTRACTION      Left    •  SECTION     • DENTAL PROCEDURE     • HYSTERECTOMY     • INCISION AND DRAINAGE LEG Left 4/15/2017    Procedure: INCISION AND DRAINAGE LOWER EXTREMITY;  Surgeon: Basilio Morris MD;  Location: [facility] OR;  Service:    • INCISION AND DRAINAGE LEG Left 2017    Procedure: Irrigation and Debridment abcess diabetic wound left foot with deep culture;  Surgeon: Basilio Morris MD;  Location: [facility] OR;  Service:    • KNEE ARTHROSCOPY Right    • ORIF TIBIA FRACTURE Right 2018    Procedure: TIBIAL  FRACTURE OPEN REDUCTION INTERNAL FIXATION;  Surgeon: Jung Barragan MD;  Location: [facility] OR;  Service: Orthopedics   • TOE AMPUTATION Right     5th   • TUBAL ABDOMINAL LIGATION       Family History   Problem Relation Age of Onset   • Heart disease Mother    • Cancer Mother    • COPD Mother    • Diabetes Other    • Depression Other      Social History     Tobacco Use   • Smoking status: Never Smoker   Substance Use Topics   • Alcohol use: No   • Drug use: No     Medications Prior to Admission   Medication Sig Dispense Refill Last Dose   • amLODIPine (NORVASC) 10 MG tablet Take 1 tablet by mouth Daily. 30 tablet 3 Past Week at Unknown time   • atenolol (TENORMIN) 25 MG tablet Take 25 mg by mouth 2 (Two) Times a Day.   Past Week at Unknown time   • escitalopram (LEXAPRO) 10 MG tablet Take 10 mg by mouth Every Night.   Past Week at Unknown time   • fludrocortisone 0.1 MG tablet Take 0.1 mg by mouth Daily.   Past Week at Unknown time   • FLUoxetine (PROzac) 20 MG capsule Take 20 mg by mouth.   Past Week at Unknown time   • gabapentin (NEURONTIN) 800 MG tablet Take 800 mg by mouth 3 (Three) Times a Day.   Past Week at Unknown time   •  HYDROcodone-acetaminophen (NORCO)  MG per tablet Take 1 tablet by mouth Every 4 (Four) Hours As Needed for Mild Pain . Pharmacy directions states every 4 to 6 hours prn pain.   Past Week at Unknown time   • hydrOXYzine (ATARAX) 25 MG tablet Take 25 mg by mouth Daily As Needed for Anxiety.   Past Week at Unknown time   • insulin aspart (novoLOG) 100 UNIT/ML injection Sliding scale TID with meals. Glucose 150-199:2 units. 240-249: 3 units. 250-299- 4 units. 300-349- 5 units. 350-400 :6 units. Over 400: 7un (Patient taking differently: Inject 0-6 Units under the skin into the appropriate area as directed 3 (Three) Times a Day Before Meals. Sliding scale TID with meals. Glucose 150-199:2 units. 240-249: 3 units. 250-299- 4 units. 300-349- 5 units. 350-400 :6 units. Over 400: 7un)  12 Past Week at Unknown time   • Insulin Glargine (BASAGLAR KWIKPEN) 100 UNIT/ML injection pen Inject 25 Units under the skin into the appropriate area as directed 2 (Two) Times a Day. 1 mL 12 Past Week at Unknown time   • pantoprazole (PROTONIX) 40 MG EC tablet Take 40 mg by mouth Daily.   Past Week at Unknown time   • pravastatin (PRAVACHOL) 20 MG tablet Take 20 mg by mouth Daily.   Past Week at Unknown time   • rOPINIRole (REQUIP) 0.5 MG tablet Take 0.5 mg by mouth 2 (Two) Times a Day. Take 1 hour before bedtime.   Past Week at Unknown time   • temazepam (RESTORIL) 30 MG capsule Take 30 mg by mouth Every Night.   Past Week at Unknown time   • tiZANidine (ZANAFLEX) 4 MG tablet Take 4 mg by mouth Every 6 (Six) Hours As Needed for Muscle Spasms.   Past Week at Unknown time   • vitamin D (ERGOCALCIFEROL) 19606 units capsule capsule Take 50,000 Units by mouth 1 (One) Time Per Week. Prior to [facility] [place] Admission, Patient was on: takes on wednesdays   Past Week at Unknown time   • CloNIDine (CATAPRES) 0.1 MG tablet Take 0.1 mg by mouth Every 6 (Six) Hours As Needed for High Blood Pressure (greater than 160/90).   Unknown at Unknown time   •  promethazine (PHENERGAN) 25 MG tablet Take 25 mg by mouth Every 6 (Six) Hours As Needed for Nausea or Vomiting.   Unknown at Unknown time     Allergies:  Latex; Morphine and related; Penicillins; Codeine; and Sulfa antibiotics      Current Facility-Administered Medications:   •  acetaminophen (TYLENOL) suppository 650 mg, 650 mg, Rectal, Q4H PRN, Shania Justin APRN, 650 mg at 03/16/19 0015  •  acetaminophen (TYLENOL) tablet 650 mg, 650 mg, Oral, Q4H PRN, Shania Justin APRN  •  aluminum-magnesium hydroxide-simethicone (MAALOX MAX) 400-400-40 MG/5ML suspension 15 mL, 15 mL, Oral, Q6H PRN, Shania Justin APRN  •  atorvastatin (LIPITOR) tablet 40 mg, 40 mg, Oral, Daily, Ashely Tatum PA, 40 mg at 03/18/19 0828  •  cefepime (MAXIPIME) 2 g/100 mL 0.9% NS (mbp), 2 g, Intravenous, Q24H, Bertha Barnes APRN, 2 g at 03/17/19 2341  •  [START ON 3/20/2019] cholecalciferol (VITAMIN D3) capsule 50,000 Units, 50,000 Units, Oral, Weekly, Ricci Rubio MD  •  CloNIDine (CATAPRES) tablet 0.1 mg, 0.1 mg, Oral, Q6H PRN, Ricci Rubio MD  •  dextrose (D50W) 25 g/ 50mL Intravenous Solution 25 g, 25 g, Intravenous, Q15 Min PRN, Shania Justin APRN  •  dextrose (GLUTOSE) oral gel 15 g, 15 g, Oral, Q15 Min PRN, Shania Justin APRN  •  escitalopram (LEXAPRO) tablet 5 mg, 5 mg, Oral, Nightly, Ashely Tatum PA, 5 mg at 03/17/19 2202  •  fludrocortisone tablet 0.1 mg, 0.1 mg, Oral, Daily, Ricci Rubio MD, 0.1 mg at 03/18/19 0828  •  FLUoxetine (PROzac) capsule 20 mg, 20 mg, Oral, Daily, Ricci Rubio MD, 20 mg at 03/18/19 0829  •  gabapentin (NEURONTIN) capsule 600 mg, 600 mg, Oral, BID, Ricci Rubio MD, 600 mg at 03/18/19 0834  •  glucagon (human recombinant) (GLUCAGEN DIAGNOSTIC) injection 1 mg, 1 mg, Subcutaneous, PRN, Shania Justin, DARIUS  •  haloperidol lactate (HALDOL) injection 2 mg, 2 mg, Intramuscular, Q4H PRN, Pan,  González MICHEL MD, 2 mg at 03/15/19 2300  •  HYDROcodone-acetaminophen (NORCO)  MG per tablet 1 tablet, 1 tablet, Oral, Q6H PRN, Ricci Rubio MD  •  insulin aspart (novoLOG) injection 0-7 Units, 0-7 Units, Subcutaneous, 4x Daily AC & at Bedtime, Shania Justin APRN, 4 Units at 03/18/19 1157  •  insulin detemir (LEVEMIR) injection 15 Units, 15 Units, Subcutaneous, Q12H, Ashely Tatum PA, 15 Units at 03/18/19 0830  •  LORazepam (ATIVAN) injection 0.5 mg, 0.5 mg, Intravenous, Q6H PRN, Ricci Rubio MD  •  metoprolol tartrate (LOPRESSOR) tablet 12.5 mg, 12.5 mg, Oral, Q12H, Ashely Tatum PA, 12.5 mg at 03/18/19 0828  •  nitroglycerin (NITROSTAT) SL tablet 0.4 mg, 0.4 mg, Sublingual, Q5 Min PRN, Shania Justin APRN  •  pantoprazole (PROTONIX) injection 40 mg, 40 mg, Intravenous, Q12H, Ashely Tatum PA, 40 mg at 03/18/19 0828  •  Pharmacy Consult - Pharmacy to dose, , Does not apply, Continuous PRN, Ashely Tatum PA  •  rOPINIRole (REQUIP) tablet 0.5 mg, 0.5 mg, Oral, BID, Ricci Rubio MD, 0.5 mg at 03/18/19 0829  •  sodium chloride 0.9 % bolus 1,593 mL, 30 mL/kg, Intravenous, PRN, Shania Justin APRN, Stopped at 03/17/19 0046  •  sodium chloride 0.9 % flush 3 mL, 3 mL, Intravenous, Q12H, Shania Justin, DARIUS, 3 mL at 03/18/19 0829  •  sodium chloride 0.9 % flush 3-10 mL, 3-10 mL, Intravenous, PRN, Shania Justin APRN  •  sodium chloride 0.9 % infusion, 100 mL/hr, Intravenous, Continuous, Bertha Barnes APRN, Last Rate: 100 mL/hr at 03/18/19 0828, 100 mL/hr at 03/18/19 0828  •  temazepam (RESTORIL) capsule 30 mg, 30 mg, Oral, Nightly, Ashely Tatum PA, Stopped at 03/17/19 2203  •  tiZANidine (ZANAFLEX) tablet 4 mg, 4 mg, Oral, Q6H PRN, Ricci Rubio MD  •  vancomycin (VANCOCIN) 1,000 mg in sodium chloride 0.9 % 250 mL IVPB, 1,000 mg, Intravenous, Q24H, Funmilayo Ventura, [facility]    Review of  Systems   Constitutional: Negative for activity change and appetite change.   HENT: Negative for sore throat and trouble swallowing.    Respiratory: Negative for cough, chest tightness, shortness of breath and wheezing.    Cardiovascular: Negative for chest pain, palpitations and leg swelling.   Gastrointestinal: Negative for abdominal distention and abdominal pain.   Genitourinary: Negative for difficulty urinating, dyspareunia, dysuria and flank pain.   Musculoskeletal: Negative for myalgias and neck pain.   Skin: Negative for color change and pallor.   Neurological: Negative for dizziness, seizures, syncope, light-headedness and headaches.     Objective      Vital Signs  Temp:  [98.2 °F (36.8 °C)-99 °F (37.2 °C)] 98.2 °F (36.8 °C)  Heart Rate:  [68-78] 76  Resp:  [16-20] 16  BP: (114-144)/(58-76) 144/76  Body mass index is 22.37 kg/m².    Intake/Output Summary (Last 24 hours) at 3/18/2019 1416  Last data filed at 3/18/2019 0800  Gross per 24 hour   Intake 1435 ml   Output --   Net 1435 ml       Physical Exam   Constitutional: She is oriented to person, place, and time. She appears well-developed and well-nourished.   HENT:   Head: Normocephalic and atraumatic.   Neck: Normal range of motion. No JVD present.   Cardiovascular: Normal rate, regular rhythm and normal heart sounds.   No murmur heard.  Pulmonary/Chest: Effort normal and breath sounds normal. No respiratory distress. She has no wheezes.   Abdominal: Soft. Bowel sounds are normal. She exhibits no distension. There is no tenderness.   Musculoskeletal: She exhibits edema (1+ Right lower extremity).   Post surgical scarring noted right lower extremity    Neurological: She is alert and oriented to person, place, and time.   Psychiatric: She has a normal mood and affect. Her behavior is normal.         Results Review:   I reviewed the patient's new clinical results.  Results from last 7 days   Lab Units 03/18/19  1249 03/18/19  0751 03/18/19  0056  03/17/19 1954   TROPONIN T ng/mL 0.118* 0.146* 0.125* 0.105*     Results from last 7 days   Lab Units 03/18/19 0055 03/17/19 1954 03/17/19 0416 03/16/19 0334 03/16/19  0053 03/15/19  1928   WBC 10*3/mm3 5.65  --  8.84 11.84* 11.21* 13.50*   HEMOGLOBIN g/dL 10.0* 10.4* 11.0* 13.9 13.8 13.9   PLATELETS 10*3/mm3 131*  --  140 147 147 205     Results from last 7 days   Lab Units 03/18/19  0055 03/17/19  0416 03/16/19  0053 03/15/19  1928   SODIUM mmol/L 138 139 135* 135*   POTASSIUM mmol/L 4.0 3.8 3.8 4.1   CHLORIDE mmol/L 110* 109* 98 93*   CO2 mmol/L 21.1* 21.2* 18.1* 23.7   BUN mg/dL 28* 31* 19 19   CREATININE mg/dL 1.34* 1.70* 1.14* 1.22*   CALCIUM mg/dL 7.8* 7.9* 8.7 9.5   GLUCOSE mg/dL 231* 61* 301* 494*   ALT (SGPT) U/L 9  --   --  10   AST (SGOT) U/L 18  --   --  18     Lab Results   Component Value Date    INR 1.12 (H) 12/26/2018    INR 0.91 04/12/2017     Lab Results   Component Value Date    MG 2.3 03/18/2019    MG 3.0 (H) 03/17/2019    MG 1.8 03/16/2019     Lab Results   Component Value Date    TSH 1.413 12/29/2018    TRIG 223 (H) 03/18/2019    HDL 43 03/18/2019    LDL 70 03/18/2019      Lab Results   Component Value Date    .0 (H) 11/21/2018       EKG: 3/17/19      Imaging Results (last 72 hours)     Procedure Component Value Units Date/Time    CT Lower Extremity Right Without Contrast [[id number]] Collected:  03/17/19 0905     Updated:  03/17/19 0909    Narrative:       EXAMINATION: CT LOWER EXTREMITY RIGHT WO CONTRAST-      CLINICAL INDICATION: Pain      TECHNIQUE: Multiple axial CT images were obtained through the right  lower extremity without IV contrast administration.  The axial CT data  set was used to generate high resolution reformatted images in the  coronal and sagittal planes to facilitate diagnostic accuracy and  treatment planning.      Radiation dose reduction techniques were utilized per ALARA protocol.  Automated exposure control was initiated through either or [employer]  or  DoseRight software packages by  protocol.       1527.20593 mGy.cm     COMPARISON: None      FINDINGS: Patient has had a prior tibia and hindfoot hardware fixation.  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 Metallic streak artifact limits evaluation.  Fracture lines are still present. Some soft tissue swelling may  represent cellulitis or edema.       Impression:       Patient has had a prior tibia and hindfoot hardware  fixation. 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 Metallic streak artifact limits  evaluation. Fracture lines are still present. Some soft tissue swelling  may represent cellulitis or edema.     This report was finalized on 3/17/2019 9:07 AM by Dr. Harrison Biswas MD.       US Renal Limited [[id number]] Collected:  03/17/19 0905     Updated:  03/17/19 0907    Narrative:       EXAMINATION: US RENAL LIMITED-      CLINICAL INDICATION:     worsening kidney function; R73.9-Hyperglycemia,  unspecified; R31.29-Other microscopic hematuria     TECHNIQUE: Multiplanar gray scale sonographic imaging of the kidneys.      COMPARISON: NONE      FINDINGS: Both kidneys are normal in size and echogenicity. There is no  renal mass, stone, or hydronephrosis seen in either kidney. The right  kidney measures    11.051284833675 cm in length; the left kidney  measures     9.98702867669 cm in length.        Impression:       Unremarkable bilateral renal ultrasound.      This report was finalized on 3/17/2019 9:05 AM by Dr. Harrison Biswas MD.       XR Chest 1 View [[id number]] Collected:  03/16/19 0851     Updated:  03/16/19 0854    Narrative:       EXAMINATION: XR CHEST 1 VW-      CLINICAL INDICATION:     fever     TECHNIQUE:  XR CHEST 1 VW-      COMPARISON: NONE      FINDINGS: Left PICC with tip in SVC.  The lungs remain aerated.  Heart and mediastinum contours are unremarkable.  No pleural  effusion.  No pneumothorax.   Bony and soft tissue structures are unremarkable.       Impression:       No radiographic evidence of acute cardiac or pulmonary  disease.     This report was finalized on 3/16/2019 8:52 AM by Dr. Harrison Biswas MD.       CT Head Without Contrast [[id number]] Collected:  03/16/19 0851     Updated:  03/16/19 0854    Narrative:          EXAMINATION: CT HEAD WO CONTRAST-      CLINICAL INDICATION:     Confusion/delirium, altered LOC, unexplained;  R73.9-Hyperglycemia, unspecified; R31.29-Other microscopic hematuria     TECHNIQUE: Contiguous axial CT images of the head were obtained without  contrast administration.      Radiation dose reduction techniques were utilized per ALARA protocol.  Automated exposure control was initiated through either or [employer] or  DoseRight software packages by  protocol.       [id number] mGy.cm     COMPARISON:  None.       FINDINGS: Generalized cerebral atrophy is present. There is no mass  effect, midline displacement, or hydrocephalus. There are patchy areas  of decreased density within the periventricular white matter which  likely reflect chronic small vessel ischemic changes. There is no CT  evidence of acute infarct or hemorrhage. Bone windows reveal no osseous  abnormalities or fractures.        Impression:       Atrophy and chronic small vessel ischemic change, but there  are no acute intracranial abnormalities identified.         This report was finalized on 3/16/2019 8:51 AM by Dr. Harrison Biswas MD.              Thank you very much for asking us to be involved in this patient's care.  We will follow along with you.    DARIUS Bernal acting as scribe for Dr. Crescencio Helton MD, [facility]  03/18/19  2:16 PM    I, Crescencio Helton MD, [facility], personally performed the services described in this documentation as scribed by the above named individual in my presence, made necessary changes and the note is both accurate and complete.     Crescencio  MD Danie, FACC   03/18/19  2:16 PM

## 2019-03-18 NOTE — PLAN OF CARE
Problem: Fall Risk (Adult)  Goal: Identify Related Risk Factors and Signs and Symptoms  Outcome: Ongoing (interventions implemented as appropriate)    Goal: Absence of Fall  Outcome: Ongoing (interventions implemented as appropriate)      Problem: Patient Care Overview  Goal: Plan of Care Review  Outcome: Ongoing (interventions implemented as appropriate)   03/18/19 1617   Plan of Care Review   Progress no change       Problem: Skin Injury Risk (Adult)  Goal: Identify Related Risk Factors and Signs and Symptoms  Outcome: Ongoing (interventions implemented as appropriate)   03/15/19 2345   Skin Injury Risk (Adult)   Related Risk Factors (Skin Injury Risk) advanced age   03/18/19 1617   Skin Injury Risk (Adult)   Related Risk Factors (Skin Injury Risk) advanced age;mobility impaired   ment       Problem: Diabetes, Type 2 (Adult)  Goal: Signs and Symptoms of Listed Potential Problems Will be Absent, Minimized or Managed (Diabetes, Type 2)  Outcome: Ongoing (interventions implemented as appropriate)   03/18/19 1617   Goal/Outcome Evaluation   Problems Assessed (Type 2 Diabetes) all   Problems Present (Type 2 Diabetes) hyperglycemia

## 2019-03-18 NOTE — PROGRESS NOTES
Vancomycin trough level obtained last night was reported at 13.4 mg/L.  Based on the timing of the level and improved renal function today, this is likely to be lower than desired.  Recommend increasing dose to 1g iv q24hrs to target trough levels of 15-20 mg/L.  Pharmacy will continue to follow and repeat trough level at steady state, if therapy continues.  Thank you.

## 2019-03-19 ENCOUNTER — APPOINTMENT (OUTPATIENT)
Dept: NUCLEAR MEDICINE | Facility: HOSPITAL | Age: 59
End: 2019-03-19

## 2019-03-19 ENCOUNTER — APPOINTMENT (OUTPATIENT)
Dept: CARDIOLOGY | Facility: HOSPITAL | Age: 59
End: 2019-03-19

## 2019-03-19 LAB
ALBUMIN SERPL-MCNC: 2.91 G/DL (ref 3.5–5.2)
ALBUMIN/GLOB SERPL: 1 G/DL
ALP SERPL-CCNC: 113 U/L (ref 39–117)
ALT SERPL W P-5'-P-CCNC: 12 U/L (ref 1–33)
ANION GAP SERPL CALCULATED.3IONS-SCNC: 9.2 MMOL/L
AST SERPL-CCNC: 22 U/L (ref 1–32)
BACTERIA SPEC AEROBE CULT: NORMAL
BASOPHILS # BLD AUTO: 0.02 10*3/MM3 (ref 0–0.2)
BASOPHILS NFR BLD AUTO: 0.4 % (ref 0–1.5)
BH CV NUCLEAR PRIOR STUDY: 3
BH CV STRESS BP STAGE 1: NORMAL
BH CV STRESS BP STAGE 2: NORMAL
BH CV STRESS COMMENTS STAGE 1: NORMAL
BH CV STRESS COMMENTS STAGE 2: NORMAL
BH CV STRESS DOSE REGADENOSON STAGE 1: 0.4
BH CV STRESS DURATION MIN STAGE 1: 0
BH CV STRESS DURATION MIN STAGE 2: 4
BH CV STRESS DURATION SEC STAGE 1: 10
BH CV STRESS DURATION SEC STAGE 2: 0
BH CV STRESS HR STAGE 1: 86
BH CV STRESS HR STAGE 2: 82
BH CV STRESS PROTOCOL 1: NORMAL
BH CV STRESS RECOVERY BP: NORMAL MMHG
BH CV STRESS RECOVERY HR: 82 BPM
BH CV STRESS STAGE 1: 1
BH CV STRESS STAGE 2: 2
BILIRUB SERPL-MCNC: 0.2 MG/DL (ref 0.2–1.2)
BUN BLD-MCNC: 19 MG/DL (ref 8–23)
BUN/CREAT SERPL: 14.2 (ref 7–25)
CALCIUM SPEC-SCNC: 8.3 MG/DL (ref 8.6–10.5)
CHLORIDE SERPL-SCNC: 109 MMOL/L (ref 98–107)
CO2 SERPL-SCNC: 19.8 MMOL/L (ref 22–29)
CREAT BLD-MCNC: 1.34 MG/DL (ref 0.57–1)
CRP SERPL-MCNC: 0.44 MG/DL (ref 0–0.5)
DEPRECATED RDW RBC AUTO: 44.7 FL (ref 37–54)
EOSINOPHIL # BLD AUTO: 0.2 10*3/MM3 (ref 0–0.4)
EOSINOPHIL NFR BLD AUTO: 4.4 % (ref 0.3–6.2)
ERYTHROCYTE [DISTWIDTH] IN BLOOD BY AUTOMATED COUNT: 13.6 % (ref 12.3–15.4)
GFR SERPL CREATININE-BSD FRML MDRD: 40 ML/MIN/1.73
GLOBULIN UR ELPH-MCNC: 3 GM/DL
GLUCOSE BLD-MCNC: 248 MG/DL (ref 65–99)
GLUCOSE BLDC GLUCOMTR-MCNC: 244 MG/DL (ref 70–130)
GLUCOSE BLDC GLUCOMTR-MCNC: 281 MG/DL (ref 70–130)
GLUCOSE BLDC GLUCOMTR-MCNC: 308 MG/DL (ref 70–130)
GRAM STN SPEC: NORMAL
HCT VFR BLD AUTO: 35.6 % (ref 34–46.6)
HGB BLD-MCNC: 11.6 G/DL (ref 12–15.9)
IMM GRANULOCYTES # BLD AUTO: 0.01 10*3/MM3 (ref 0–0.05)
IMM GRANULOCYTES NFR BLD AUTO: 0.2 % (ref 0–0.5)
LV EF NUC BP: 52 %
LYMPHOCYTES # BLD AUTO: 1.2 10*3/MM3 (ref 0.7–3.1)
LYMPHOCYTES NFR BLD AUTO: 26.5 % (ref 19.6–45.3)
MAGNESIUM SERPL-MCNC: 2 MG/DL (ref 1.6–2.4)
MAXIMAL PREDICTED HEART RATE: 159 BPM
MCH RBC QN AUTO: 29.3 PG (ref 26.6–33)
MCHC RBC AUTO-ENTMCNC: 32.6 G/DL (ref 31.5–35.7)
MCV RBC AUTO: 89.9 FL (ref 79–97)
MONOCYTES # BLD AUTO: 0.32 10*3/MM3 (ref 0.1–0.9)
MONOCYTES NFR BLD AUTO: 7.1 % (ref 5–12)
NEUTROPHILS # BLD AUTO: 2.77 10*3/MM3 (ref 1.4–7)
NEUTROPHILS NFR BLD AUTO: 61.4 % (ref 42.7–76)
PERCENT MAX PREDICTED HR: 54.09 %
PLATELET # BLD AUTO: 116 10*3/MM3 (ref 140–450)
PMV BLD AUTO: 11.3 FL (ref 6–12)
POTASSIUM BLD-SCNC: 4.9 MMOL/L (ref 3.5–5.2)
PROT SERPL-MCNC: 5.9 G/DL (ref 6–8.5)
RBC # BLD AUTO: 3.96 10*6/MM3 (ref 3.77–5.28)
SODIUM BLD-SCNC: 138 MMOL/L (ref 136–145)
STRESS BASELINE BP: NORMAL MMHG
STRESS BASELINE HR: 73 BPM
STRESS PERCENT HR: 64 %
STRESS POST PEAK BP: NORMAL MMHG
STRESS POST PEAK HR: 86 BPM
STRESS TARGET HR: 135 BPM
WBC NRBC COR # BLD: 4.52 10*3/MM3 (ref 3.4–10.8)

## 2019-03-19 PROCEDURE — 85025 COMPLETE CBC W/AUTO DIFF WBC: CPT | Performed by: INTERNAL MEDICINE

## 2019-03-19 PROCEDURE — 63710000001 INSULIN DETEMIR PER 5 UNITS: Performed by: PHYSICIAN ASSISTANT

## 2019-03-19 PROCEDURE — 0 TECHNETIUM SESTAMIBI: Performed by: INTERNAL MEDICINE

## 2019-03-19 PROCEDURE — 25010000002 REGADENOSON 0.4 MG/5ML SOLUTION: Performed by: INTERNAL MEDICINE

## 2019-03-19 PROCEDURE — 78452 HT MUSCLE IMAGE SPECT MULT: CPT

## 2019-03-19 PROCEDURE — 82962 GLUCOSE BLOOD TEST: CPT

## 2019-03-19 PROCEDURE — 86140 C-REACTIVE PROTEIN: CPT | Performed by: INTERNAL MEDICINE

## 2019-03-19 PROCEDURE — 83735 ASSAY OF MAGNESIUM: CPT | Performed by: NURSE PRACTITIONER

## 2019-03-19 PROCEDURE — 93018 CV STRESS TEST I&R ONLY: CPT | Performed by: INTERNAL MEDICINE

## 2019-03-19 PROCEDURE — 99231 SBSQ HOSP IP/OBS SF/LOW 25: CPT | Performed by: INTERNAL MEDICINE

## 2019-03-19 PROCEDURE — 93017 CV STRESS TEST TRACING ONLY: CPT

## 2019-03-19 PROCEDURE — 78452 HT MUSCLE IMAGE SPECT MULT: CPT | Performed by: INTERNAL MEDICINE

## 2019-03-19 PROCEDURE — 80053 COMPREHEN METABOLIC PANEL: CPT | Performed by: PHYSICIAN ASSISTANT

## 2019-03-19 PROCEDURE — 99233 SBSQ HOSP IP/OBS HIGH 50: CPT | Performed by: INTERNAL MEDICINE

## 2019-03-19 PROCEDURE — A9500 TC99M SESTAMIBI: HCPCS | Performed by: INTERNAL MEDICINE

## 2019-03-19 PROCEDURE — 63710000001 INSULIN ASPART PER 5 UNITS: Performed by: NURSE PRACTITIONER

## 2019-03-19 RX ORDER — CEFDINIR 300 MG/1
300 CAPSULE ORAL EVERY 12 HOURS SCHEDULED
Status: DISCONTINUED | OUTPATIENT
Start: 2019-03-19 | End: 2019-03-20 | Stop reason: HOSPADM

## 2019-03-19 RX ORDER — DOXYCYCLINE 100 MG/1
100 CAPSULE ORAL EVERY 12 HOURS SCHEDULED
Status: DISCONTINUED | OUTPATIENT
Start: 2019-03-19 | End: 2019-03-20 | Stop reason: HOSPADM

## 2019-03-19 RX ORDER — HYDRALAZINE HYDROCHLORIDE 20 MG/ML
10 INJECTION INTRAMUSCULAR; INTRAVENOUS EVERY 6 HOURS PRN
Status: DISCONTINUED | OUTPATIENT
Start: 2019-03-19 | End: 2019-03-20 | Stop reason: HOSPADM

## 2019-03-19 RX ORDER — AMLODIPINE BESYLATE 10 MG/1
10 TABLET ORAL
Status: DISCONTINUED | OUTPATIENT
Start: 2019-03-19 | End: 2019-03-20

## 2019-03-19 RX ADMIN — INSULIN ASPART 5 UNITS: 100 INJECTION, SOLUTION INTRAVENOUS; SUBCUTANEOUS at 21:07

## 2019-03-19 RX ADMIN — PANTOPRAZOLE SODIUM 40 MG: 40 INJECTION, POWDER, FOR SOLUTION INTRAVENOUS at 08:47

## 2019-03-19 RX ADMIN — METOPROLOL TARTRATE 12.5 MG: 25 TABLET, FILM COATED ORAL at 08:38

## 2019-03-19 RX ADMIN — PANTOPRAZOLE SODIUM 40 MG: 40 INJECTION, POWDER, FOR SOLUTION INTRAVENOUS at 21:07

## 2019-03-19 RX ADMIN — INSULIN ASPART 3 UNITS: 100 INJECTION, SOLUTION INTRAVENOUS; SUBCUTANEOUS at 16:31

## 2019-03-19 RX ADMIN — REGADENOSON 0.4 MG: 0.08 INJECTION, SOLUTION INTRAVENOUS at 13:20

## 2019-03-19 RX ADMIN — CLONIDINE HYDROCHLORIDE 0.1 MG: 0.1 TABLET ORAL at 14:56

## 2019-03-19 RX ADMIN — METOPROLOL TARTRATE 25 MG: 25 TABLET, FILM COATED ORAL at 21:07

## 2019-03-19 RX ADMIN — SODIUM CHLORIDE, PRESERVATIVE FREE 3 ML: 5 INJECTION INTRAVENOUS at 21:07

## 2019-03-19 RX ADMIN — CEFDINIR 300 MG: 300 CAPSULE ORAL at 16:32

## 2019-03-19 RX ADMIN — TEMAZEPAM 30 MG: 15 CAPSULE ORAL at 21:11

## 2019-03-19 RX ADMIN — DOXYCYCLINE 100 MG: 100 CAPSULE ORAL at 21:11

## 2019-03-19 RX ADMIN — HYDROCODONE BITARTRATE AND ACETAMINOPHEN 1 TABLET: 10; 325 TABLET ORAL at 08:37

## 2019-03-19 RX ADMIN — AMLODIPINE BESYLATE 10 MG: 10 TABLET ORAL at 16:31

## 2019-03-19 RX ADMIN — GABAPENTIN 600 MG: 300 CAPSULE ORAL at 21:11

## 2019-03-19 RX ADMIN — ROPINIROLE HYDROCHLORIDE 0.5 MG: 0.25 TABLET, FILM COATED ORAL at 21:07

## 2019-03-19 RX ADMIN — ACETAMINOPHEN 650 MG: 325 TABLET, FILM COATED ORAL at 03:49

## 2019-03-19 RX ADMIN — HYDROCODONE BITARTRATE AND ACETAMINOPHEN 1 TABLET: 10; 325 TABLET ORAL at 14:56

## 2019-03-19 RX ADMIN — TECHNETIUM TC 99M SESTAMIBI 1 DOSE: 1 INJECTION INTRAVENOUS at 13:20

## 2019-03-19 RX ADMIN — INSULIN DETEMIR 15 UNITS: 100 INJECTION, SOLUTION SUBCUTANEOUS at 21:08

## 2019-03-19 RX ADMIN — TECHNETIUM TC 99M SESTAMIBI 1 DOSE: 1 INJECTION INTRAVENOUS at 10:35

## 2019-03-19 NOTE — PROGRESS NOTES
Subjective     History:   Reny Elena is a 59 y.o. female admitted on 3/15/2019 secondary to <principal problem not specified>     Procedures: None    CC: Follow up sepsis    Patient seen and examined with GLORIA Nelson. Awake and alert with sitter present at bedside. Confusion appears resolved. Denies any current complaints. Denies CP, dyspnea or palpitations. Denies fever or chills. Denies nausea or vomiting. No acute events overnight per RN.     History taken from: patient, chart, and RN.      Objective     Vital Signs  Temp:  [98.2 °F (36.8 °C)-98.6 °F (37 °C)] 98.2 °F (36.8 °C)  Heart Rate:  [68-79] 79  Resp:  [16-18] 18  BP: (114-181)/(58-93) 127/72    Intake/Output Summary (Last 24 hours) at 3/18/2019 2244  Last data filed at 3/18/2019 1346  Gross per 24 hour   Intake 1320 ml   Output --   Net 1320 ml         Physical Exam:  General:    Awake, alert, in no acute distress   Heart:      Normal S1 and S2. Regular rate and rhythm. No significant murmur, rubs or gallops appreciated.   Lungs:     Respirations regular, even and unlabored. Lungs clear to auscultation B/L. No wheezes, rales or rhonchi.   Abdomen:   Soft and nontender. No guarding, rebound tenderness or  organomegaly noted. Bowel sounds present x 4.   Extremities:  No clubbing, cyanosis or edema noted. Moves UE and LE equally B/L.Small ulcer noted on right heel with mild erythema.      Results Review:    Results from last 7 days   Lab Units 03/18/19 0055 03/17/19 1954 03/17/19 0416 03/16/19  0334 03/16/19  0053 03/15/19  1928   WBC 10*3/mm3 5.65  --  8.84 11.84* 11.21* 13.50*   HEMOGLOBIN g/dL 10.0* 10.4* 11.0* 13.9 13.8 13.9   PLATELETS 10*3/mm3 131*  --  140 147 147 205     Results from last 7 days   Lab Units 03/18/19 0055 03/17/19 0416 03/16/19  0053 03/15/19  1928   SODIUM mmol/L 138 139 135* 135*   POTASSIUM mmol/L 4.0 3.8 3.8 4.1   CHLORIDE mmol/L 110* 109* 98 93*   CO2 mmol/L 21.1* 21.2* 18.1* 23.7   BUN mg/dL 28* 31* 19 19   CREATININE  mg/dL 1.34* 1.70* 1.14* 1.22*   CALCIUM mg/dL 7.8* 7.9* 8.7 9.5   GLUCOSE mg/dL 231* 61* 301* 494*     Results from last 7 days   Lab Units 03/18/19  0055 03/15/19  1928   BILIRUBIN mg/dL <0.2 0.6   ALK PHOS U/L 103 159*   AST (SGOT) U/L 18 18   ALT (SGPT) U/L 9 10     Results from last 7 days   Lab Units 03/18/19  0055 03/17/19  0416 03/16/19  0053 03/15/19  1928   MAGNESIUM mg/dL 2.3 3.0* 1.8 1.9         Results from last 7 days   Lab Units 03/18/19  1249 03/18/19  0751 03/18/19  0055   TROPONIN T ng/mL 0.118* 0.146* 0.125*       Imaging Results (last 24 hours)     ** No results found for the last 24 hours. **            Medications:    atorvastatin 40 mg Oral Daily   cefepime 2 g Intravenous Q24H   [START ON 3/20/2019] cholecalciferol 50,000 Units Oral Weekly   clopidogrel 75 mg Oral Daily   escitalopram 5 mg Oral Nightly   fludrocortisone 0.1 mg Oral Daily   FLUoxetine 20 mg Oral Daily   gabapentin 600 mg Oral BID   insulin aspart 0-7 Units Subcutaneous 4x Daily AC & at Bedtime   insulin detemir 15 Units Subcutaneous Q12H   metoprolol tartrate 12.5 mg Oral Q12H   pantoprazole 40 mg Intravenous Q12H   rOPINIRole 0.5 mg Oral BID   sodium chloride 3 mL Intravenous Q12H   temazepam 30 mg Oral Nightly   vancomycin 1,000 mg Intravenous Q24H       Pharmacy Consult - Pharmacy to dose            Assessment/Plan   Severe sepsis with metabolic encephalopathy: Concern for RLE cellulitis. Appears clinically improved. Fever has resolved. Leukocytosis has resolved with stable WBC today. CRP mildly elevated. Cultures with NGTD. Currently on Vanc and Cefepime. Will plan to deescalate soon if continues to clinically improve.     HORACE on CKD III with proteinuria: Renal US unremarkable. Creatine improved today and IV fluids have been D/C'd. Repeat labs in the AM. Nephrology input appreciated.     Metabolic encephalopathy: CT head negative for any acute intracranial abnormalities. Possibly 2/2 above. Now resolved. D/C bedside sitter.  "    Abnormal EKG with elevated troponin: Pt denies CP. Echo reveals an EF of 46-50%, grade I diastolic dysfunction, mild AR and mild MR. Cardiology has added Plavix. Avoiding ASA in setting of anemia and hx of bleeding peptic ulcers. Cardiology consulted with plans for nuclear stress test tomorrow. Transfer to telemetry. Cardiology input appreciated.     Acute hypoxic respiratory failure: Likely 2/2 sepsis. No evidence of pneumonia on imaging. Attempt to wean supplemental O2.    DM II, insulin dependent: BG levels overall stable today. Cont current insulin regimen and monitor.     Prolonged QTc: Lexapro reduced. Monitor electrolytes.     Essential HTN: Hypotensive upon admission but has now risen. Holding home atenolol and amlodipine. Cont metoprolol in setting of elevation troponin.     Normocytic anemia: Iron studies consistent with anemia of chronic disease. Reports of \"rui\" colored emesis on admission. Vomiting now resolved. Hx of bleeding peptic ulcers. Cont PPI.     Dyslipidemia: Cont statin.     DVT PPX: SCD's.    Discussed with Dr. Burks.    Transfer to telemetry floor.     Pt is at high risk 2/2 severe sepsis with metabolic encephalopathy, HORACE on CKD III, RLE cellulitis, abnormal EKG with elevated troponin, acute hypoxic resp failure, DM with labile BG and anemia.       Mark Crawford, DO  03/18/19  10:44 PM  "

## 2019-03-19 NOTE — PROGRESS NOTES
Subjective     History:   Reny Elena is a 59 y.o. female admitted on 3/15/2019 secondary to <principal problem not specified>     Procedures:   3/19/19: Nuclear stress test with results pending     CC: Follow up sepsis    Patient seen and examined with GLORIA Arboleda. Awake and alert. Recently returned from stress test. Denies CP, dyspnea or palpitations. Denies HA or vision changes. Reports dysuria. Denies fever or chills. Denies nausea or vomiting. BP elevated. No acute events overnight per RN.      History taken from: patient, chart, and RN.      Objective     Vital Signs  Temp:  [97.5 °F (36.4 °C)-98.7 °F (37.1 °C)] 98 °F (36.7 °C)  Heart Rate:  [72-84] 73  Resp:  [18] 18  BP: (111-218)/() 111/62    Intake/Output Summary (Last 24 hours) at 3/19/2019 1841  Last data filed at 3/19/2019 1610  Gross per 24 hour   Intake 600 ml   Output 3690 ml   Net -3090 ml         Physical Exam:  General:    Awake, alert, in no acute distress   Heart:      Normal S1 and S2. Regular rate and rhythm. No significant murmur, rubs or gallops appreciated.   Lungs:     Respirations regular, even and unlabored. Lungs clear to auscultation B/L. No wheezes, rales or rhonchi.   Abdomen:   Soft and nontender. No guarding, rebound tenderness or  organomegaly noted. Bowel sounds present x 4.   Extremities:  No clubbing, cyanosis or edema noted. Moves UE and LE equally B/L.Small ulcer noted on right heel with mild erythema but improving.       Results Review:    Results from last 7 days   Lab Units 03/19/19  0333 03/18/19  0055 03/17/19 1954 03/17/19  0416 03/16/19  0334 03/16/19  0053 03/15/19  1928   WBC 10*3/mm3 4.52 5.65  --  8.84 11.84* 11.21* 13.50*   HEMOGLOBIN g/dL 11.6* 10.0* 10.4* 11.0* 13.9 13.8 13.9   PLATELETS 10*3/mm3 116* 131*  --  140 147 147 205     Results from last 7 days   Lab Units 03/19/19  0333 03/18/19  0055 03/17/19  0416 03/16/19  0053 03/15/19  1928   SODIUM mmol/L 138 138 139 135* 135*   POTASSIUM mmol/L 4.9  4.0 3.8 3.8 4.1   CHLORIDE mmol/L 109* 110* 109* 98 93*   CO2 mmol/L 19.8* 21.1* 21.2* 18.1* 23.7   BUN mg/dL 19 28* 31* 19 19   CREATININE mg/dL 1.34* 1.34* 1.70* 1.14* 1.22*   CALCIUM mg/dL 8.3* 7.8* 7.9* 8.7 9.5   GLUCOSE mg/dL 248* 231* 61* 301* 494*     Results from last 7 days   Lab Units 03/19/19  0333 03/18/19  0055 03/15/19  1928   BILIRUBIN mg/dL 0.2 <0.2 0.6   ALK PHOS U/L 113 103 159*   AST (SGOT) U/L 22 18 18   ALT (SGPT) U/L 12 9 10     Results from last 7 days   Lab Units 03/19/19  0333 03/18/19  0055 03/17/19  0416 03/16/19  0053 03/15/19  1928   MAGNESIUM mg/dL 2.0 2.3 3.0* 1.8 1.9         Results from last 7 days   Lab Units 03/18/19  1249 03/18/19  0751 03/18/19  0055   TROPONIN T ng/mL 0.118* 0.146* 0.125*       Imaging Results (last 24 hours)     ** No results found for the last 24 hours. **            Medications:    amLODIPine 10 mg Oral Q24H   atorvastatin 40 mg Oral Daily   cefdinir 300 mg Oral Q12H   [START ON 3/20/2019] cholecalciferol 50,000 Units Oral Weekly   clopidogrel 75 mg Oral Daily   doxycycline 100 mg Oral Q12H   fludrocortisone 0.1 mg Oral Daily   FLUoxetine 20 mg Oral Daily   gabapentin 600 mg Oral BID   insulin aspart 0-7 Units Subcutaneous 4x Daily AC & at Bedtime   insulin detemir 15 Units Subcutaneous Q12H   metoprolol tartrate 25 mg Oral Q12H   multivitamin 1 tablet Oral Daily   pantoprazole 40 mg Intravenous Q12H   rOPINIRole 0.5 mg Oral BID   sodium chloride 3 mL Intravenous Q12H   temazepam 30 mg Oral Nightly       Pharmacy Consult - Pharmacy to dose            Assessment/Plan   Severe sepsis with metabolic encephalopathy: Concern for RLE cellulitis. Appears clinically improved. Fever has resolved. Leukocytosis has resolved with stable WBC today. CRP has normalized today. Cultures with NGTD. Currently on Vanc and Cefepime. Will deescalate to PO Doxycycline and Omnicef.     HORACE on CKD III with proteinuria: Renal US unremarkable. Creatine overall improved and stable  "today. Repeat labs in the AM. Nephrology input appreciated.     Metabolic encephalopathy: CT head negative for any acute intracranial abnormalities. Possibly 2/2 above. Now resolved.      Abnormal EKG with elevated troponin: Pt denies CP. Echo reveals an EF of 46-50%, grade I diastolic dysfunction, mild AR and mild MR. Cardiology has added Plavix. Avoiding ASA in setting of anemia and hx of bleeding peptic ulcers. Stress test completed today with results pending. Cardiology input appreciated.     Acute hypoxic respiratory failure: Likely 2/2 sepsis. No evidence of pneumonia on imaging. Improved.    DM II, insulin dependent: BG levels fluctuating today. Cont current insulin regimen and monitor.If remains elevated will adjust insulin regimen.     Prolonged QTc: Stop Lexapro as pt is on Prozac as well. Monitor electrolytes. Repeat EKG ordered for the AM.     Essential HTN: Hypotensive upon admission but has now risen with elevated BP today. Increase metoprolol. Restart home amlodipine. Add PRN IV hydralazine.     Normocytic anemia: Iron studies consistent with anemia of chronic disease. Reports of \"rui\" colored emesis on admission. Vomiting now resolved. Hx of bleeding peptic ulcers. Cont PPI.     Dyslipidemia: Cont statin.     Dysuria: Repeat UA.     DVT PPX: SCD's.      Pt is at high risk 2/2 severe sepsis with metabolic encephalopathy, HORACE on CKD III, RLE cellulitis, abnormal EKG with elevated troponin, acute hypoxic resp failure, DM with labile BG and anemia.       Mark Crawford, DO  03/19/19  6:41 PM  "

## 2019-03-19 NOTE — PLAN OF CARE
Problem: Patient Care Overview  Goal: Plan of Care Review  Outcome: Ongoing (interventions implemented as appropriate)    Goal: Individualization and Mutuality  Outcome: Ongoing (interventions implemented as appropriate)    Goal: Interprofessional Rounds/Family Conf  Outcome: Ongoing (interventions implemented as appropriate)      Problem: Skin Injury Risk (Adult)  Goal: Identify Related Risk Factors and Signs and Symptoms  Outcome: Ongoing (interventions implemented as appropriate)    Goal: Skin Health and Integrity  Outcome: Ongoing (interventions implemented as appropriate)      Problem: Diabetes, Type 2 (Adult)  Goal: Signs and Symptoms of Listed Potential Problems Will be Absent, Minimized or Managed (Diabetes, Type 2)  Outcome: Ongoing (interventions implemented as appropriate)      Problem: Cardiac: ACS (Acute Coronary Syndrome) (Adult)  Goal: Signs and Symptoms of Listed Potential Problems Will be Absent, Minimized or Managed (Cardiac: ACS)  Outcome: Ongoing (interventions implemented as appropriate)

## 2019-03-19 NOTE — PROGRESS NOTES
LOS: 2 days     Name: Reny Elena  Age/Sex: 59 y.o. female  :  1960        PCP: Yandel Paulson MD    Active Problems:    Hyperglycemia    Confusion      Admission Information:    Reny Elena is a 59 y.o. female with past medical history significant for hypertension, dyslipidemia, diabetes mellitus type 2 insulin dependent, chronic kidney disease, peptic ulcer disease and right tib/fib fracture status post repair. She was admitted to [facility] on 3/15/19 with complaints of nausea, vomiting and fever. Initial evaluation in the ED revealed a fever of 103.3 F, hypotension and hypoxemia. Chest x-ray was unremarkable. White blood cell count was 13.5 and creatinine was 1.22. She was diagnosed with sepsis and was admitted for further evaluation. During admission she developed acute kidney injury, creatinine 1.7, nephrology as consulted.  Troponin on 3/17/19 was 0.105 and peaked at 0.146. It has trended down to 0.118. EKG revealed changes concerning for ischemia, hence cardiology was consulted. Upon evaluation today, patient denies chest pain and shortness of breath. She reports the fever, abdominal pain and vomiting has resolved.Overall she reports felling better. She is not a smoker. She has a history of peptic ulcers that was diagnosed in 2017. Her last echocardiogram on 18 revealed an EF of 56-60% with mild mitral and tricuspid regurgitation and mild aortic stenosis. Echocardiogram has been ordered by the primary team and stools for occult blood are pending.           Following for: possible non-ST elevation MI    Telemetry Monitoring: sinus rhythm in the 80s    Interval history: She is resting in bed this morning. She denies any chest pains or shortness of breath. Her only complaint is urinary frequency. She is NPO for Lexiscan sestamibi  this morning. Echocardiogram reveals EF 46-50%. Grade I diastolic dysfunction.      Subjective     ROS    Vital Signs  Vitals:    19  03/19/19 0115 03/19/19 0343 03/19/19 0639   BP: 127/72 155/86 165/89 164/89   BP Location: Right arm Right arm Right arm    Patient Position: Lying Sitting Lying Lying   Pulse: 79 83 82 84   Resp: 18 18 18 18   Temp: 98.2 °F (36.8 °C) 98.7 °F (37.1 °C) 98 °F (36.7 °C) 98 °F (36.7 °C)   TempSrc: Oral Oral Oral Oral   SpO2: 96% 96% 94% 97%   Weight:       Height:         Body mass index is 22.37 kg/m².      Intake/Output Summary (Last 24 hours) at 3/19/2019 0914  Last data filed at 3/19/2019 0343  Gross per 24 hour   Intake 240 ml   Output 2200 ml   Net -1960 ml       Physical Exam   Constitutional: She is oriented to person, place, and time. She appears well-developed and well-nourished.   HENT:   Head: Normocephalic and atraumatic.   Neck: No JVD present.   Cardiovascular: Normal rate, regular rhythm and normal heart sounds. Exam reveals no S3 and no S4.   No murmur heard.  Pulmonary/Chest: Effort normal and breath sounds normal. She has no wheezes. She has no rales.   Abdominal: Soft. Bowel sounds are normal.   Musculoskeletal: She exhibits edema (mild edema RLE).   Neurological: She is alert and oriented to person, place, and time.   Skin: Skin is warm and dry.   Surgical scarring noted RLE     Psychiatric: She has a normal mood and affect. Her behavior is normal.              Results Review:     Results from last 7 days   Lab Units 03/19/19 0333 03/18/19 0055 03/17/19 1954 03/17/19 0416 03/16/19  0334 03/16/19  0053 03/15/19  1928   WBC 10*3/mm3 4.52 5.65  --  8.84 11.84* 11.21* 13.50*   HEMOGLOBIN g/dL 11.6* 10.0* 10.4* 11.0* 13.9 13.8 13.9   PLATELETS 10*3/mm3 116* 131*  --  140 147 147 205     Results from last 7 days   Lab Units 03/19/19 0333 03/18/19 0055 03/17/19 0416 03/16/19  0053 03/15/19  1928   SODIUM mmol/L 138 138 139 135* 135*   POTASSIUM mmol/L 4.9 4.0 3.8 3.8 4.1   CHLORIDE mmol/L 109* 110* 109* 98 93*   CO2 mmol/L 19.8* 21.1* 21.2* 18.1* 23.7   BUN mg/dL 19 28* 31* 19 19   CREATININE mg/dL  1.34* 1.34* 1.70* 1.14* 1.22*   CALCIUM mg/dL 8.3* 7.8* 7.9* 8.7 9.5   GLUCOSE mg/dL 248* 231* 61* 301* 494*     Results from last 7 days   Lab Units 19  1249 19  0751 19  0055 19   TROPONIN T ng/mL 0.118* 0.146* 0.125* 0.105*     Reny Elena   Regadenoson Stress Test With Myocardial Perfusion SPECT (Multi Study)   Order# [id number]   Reading physician: Crescencio Helton MD Ordering physician: Crescencio Helton MD Study date: 3/19/19   Patient Information     Patient Name  Reny Elena MRN  [id number] Sex  Female  (Age)  1960 (59 y.o.)   Admission Information     Admission Date/Time Discharge Date/Time Room/Bed   03/15/19  1901  3315/2S   Interpretation Summary     · Myocardial perfusion imaging indicates a normal myocardial perfusion study with no evidence of ischemia.  · Normal LV cavity size. Abnormal LV wall motion consistent with mild apical hypokinesis.  · Left ventricular ejection fraction is borderline normal (Calculated EF = 52%).  · Impressions are consistent with a low risk study.               I reviewed the patient's new clinical results.  I reviewed the patient's new imaging results and agree with the interpretation.  I personally viewed and interpreted the patient's EKG/Telemetry data      Medication Review:     atorvastatin 40 mg Oral Daily   cefepime 2 g Intravenous Q24H   [START ON 3/20/2019] cholecalciferol 50,000 Units Oral Weekly   clopidogrel 75 mg Oral Daily   escitalopram 5 mg Oral Nightly   fludrocortisone 0.1 mg Oral Daily   FLUoxetine 20 mg Oral Daily   gabapentin 600 mg Oral BID   insulin aspart 0-7 Units Subcutaneous 4x Daily AC & at Bedtime   insulin detemir 15 Units Subcutaneous Q12H   metoprolol tartrate 12.5 mg Oral Q12H   multivitamin 1 tablet Oral Daily   pantoprazole 40 mg Intravenous Q12H   rOPINIRole 0.5 mg Oral BID   sodium chloride 3 mL Intravenous Q12H   temazepam 30 mg Oral Nightly   vancomycin 1,000 mg Intravenous Q24H       Pharmacy  Consult - Pharmacy to dose        Assessment:  1. Elevated Troponin, Peaked at 0.146, trended down to 0.118. Suspected acute non-ST elevation MI.  2. Abnormal EKG, T wave inversion concerning for anterior wall ischemia.  3. Essential Hypertension, controlled  4. Acute kidney injury, On Stage III CKD, Creatinine 1.34, Nephrology managing  5. Sepsis, Managed by the primary team  6. Anemia with a drop in H/H from 13.9 to 10.0 since 3/16/19, stool for occult blood pending, Hemoglobin 11.6  7. Acute hypoxic respiratory failure,resolved.  8. Prolonged QTc, 490 ms.  9. Diabetes Mellitus, type 2  10. Dyslipidemia, on statin therapy  11. Mild cardiomyopathy with EF 46-50%      Recommendations:  1. Will make further recommendation pending lexiscan sestamibi study results.  2. Continue with Plavix, metoprolol, and atorvastatin    I discussed the patients findings and my recommendations with patient and family      DARIUS Rider, acting as scribe for Dr. Crescencio Helton MD, [facility].  03/19/19  9:14 AM    Addendum:         Disposition        Dr. Crescencio Helton MD, FACC  03/19/19  9:14 AM

## 2019-03-19 NOTE — PROGRESS NOTES
Nephrology Progress Note      Subjective     Patient feeling much better denies any nausea vomiting or diarrhea has some confusion    Objective       Vitals signs :     Temp:  [98 °F (36.7 °C)-98.7 °F (37.1 °C)] 98 °F (36.7 °C)  Heart Rate:  [74-84] 84  Resp:  [16-18] 18  BP: (127-181)/(72-93) 164/89    I/O last 3 completed shifts:  In: 1320 [P.O.:1320]  Out: 2200 [Urine:2200]  No intake/output data recorded.    Physical Exam:    General Appearance : He is alert answering most of the question  Head  :  normocephalic, without obvious abnormality and atraumatic  Eyes  :  no icterus, no pallor and PERRLA  Neck  :  no adenopathy, suppple, no carotid bruit, no JVD   Lungs : clear to auscultation, respirations regular and unlabored  Heart :  regular rhythm & normal rate, normal S1, S2 and no murmur, no nahid, no rub  Abdomen : normal bowel sounds, no masses,  soft non-tender  Extremities : moves extremities well, TRACE  edema, no cyanosis and no redness  Skin :  no bleeding, bruising or rash  Neurologic : Mildly confused      Laboratory Data :         WBC WBC   Date Value Ref Range Status   03/19/2019 4.52 3.40 - 10.80 10*3/mm3 Final   03/18/2019 5.65 3.40 - 10.80 10*3/mm3 Final   03/17/2019 8.84 3.40 - 10.80 10*3/mm3 Final      HGB Hemoglobin   Date Value Ref Range Status   03/19/2019 11.6 (L) 12.0 - 15.9 g/dL Final   03/18/2019 10.0 (L) 12.0 - 15.9 g/dL Final   03/17/2019 10.4 (L) 12.0 - 15.9 g/dL Final   03/17/2019 11.0 (L) 12.0 - 15.9 g/dL Final      HCT Hematocrit   Date Value Ref Range Status   03/19/2019 35.6 34.0 - 46.6 % Final   03/18/2019 31.1 (L) 34.0 - 46.6 % Final   03/17/2019 32.2 (L) 34.0 - 46.6 % Final   03/17/2019 33.8 (L) 34.0 - 46.6 % Final      Platlets No results found for: LABPLAT   MCV MCV   Date Value Ref Range Status   03/19/2019 89.9 79.0 - 97.0 fL Final   03/18/2019 90.1 79.0 - 97.0 fL Final   03/17/2019 89.9 79.0 - 97.0 fL Final          Sodium Sodium   Date Value Ref Range Status   03/19/2019  138 136 - 145 mmol/L Final   03/18/2019 138 136 - 145 mmol/L Final   03/17/2019 139 136 - 145 mmol/L Final      Potassium Potassium   Date Value Ref Range Status   03/19/2019 4.9 3.5 - 5.2 mmol/L Final   03/18/2019 4.0 3.5 - 5.2 mmol/L Final   03/17/2019 3.8 3.5 - 5.2 mmol/L Final      Chloride Chloride   Date Value Ref Range Status   03/19/2019 109 (H) 98 - 107 mmol/L Final   03/18/2019 110 (H) 98 - 107 mmol/L Final   03/17/2019 109 (H) 98 - 107 mmol/L Final      CO2 CO2   Date Value Ref Range Status   03/19/2019 19.8 (L) 22.0 - 29.0 mmol/L Final   03/18/2019 21.1 (L) 22.0 - 29.0 mmol/L Final   03/17/2019 21.2 (L) 22.0 - 29.0 mmol/L Final      BUN BUN   Date Value Ref Range Status   03/19/2019 19 8 - 23 mg/dL Final   03/18/2019 28 (H) 8 - 23 mg/dL Final   03/17/2019 31 (H) 8 - 23 mg/dL Final      Creatinine Creatinine   Date Value Ref Range Status   03/19/2019 1.34 (H) 0.57 - 1.00 mg/dL Final   03/18/2019 1.34 (H) 0.57 - 1.00 mg/dL Final   03/17/2019 1.70 (H) 0.57 - 1.00 mg/dL Final      Calcium Calcium   Date Value Ref Range Status   03/19/2019 8.3 (L) 8.6 - 10.5 mg/dL Final   03/18/2019 7.8 (L) 8.6 - 10.5 mg/dL Final   03/17/2019 7.9 (L) 8.6 - 10.5 mg/dL Final      PO4 No results found for: CAPO4   Albumin Albumin   Date Value Ref Range Status   03/19/2019 2.91 (L) 3.50 - 5.20 g/dL Final   03/18/2019 2.80 (L) 3.50 - 5.20 g/dL Final      Magnesium Magnesium   Date Value Ref Range Status   03/19/2019 2.0 1.6 - 2.4 mg/dL Final   03/18/2019 2.3 1.6 - 2.4 mg/dL Final   03/17/2019 3.0 (H) 1.6 - 2.4 mg/dL Final      Uric Acid No results found for: URICACID       Medications :       atorvastatin 40 mg Oral Daily   cefepime 2 g Intravenous Q24H   [START ON 3/20/2019] cholecalciferol 50,000 Units Oral Weekly   clopidogrel 75 mg Oral Daily   escitalopram 5 mg Oral Nightly   fludrocortisone 0.1 mg Oral Daily   FLUoxetine 20 mg Oral Daily   gabapentin 600 mg Oral BID   insulin aspart 0-7 Units Subcutaneous 4x Daily AC & at  Bedtime   insulin detemir 15 Units Subcutaneous Q12H   metoprolol tartrate 12.5 mg Oral Q12H   multivitamin 1 tablet Oral Daily   pantoprazole 40 mg Intravenous Q12H   rOPINIRole 0.5 mg Oral BID   sodium chloride 3 mL Intravenous Q12H   temazepam 30 mg Oral Nightly   vancomycin 1,000 mg Intravenous Q24H       Pharmacy Consult - Pharmacy to dose        Radiology Results :     Imaging Results (last 72 hours)     Procedure Component Value Units Date/Time    CT Lower Extremity Right Without Contrast [[id number]] Collected:  03/17/19 0905     Updated:  03/17/19 0909    Narrative:       EXAMINATION: CT LOWER EXTREMITY RIGHT WO CONTRAST-      CLINICAL INDICATION: Pain      TECHNIQUE: Multiple axial CT images were obtained through the right  lower extremity without IV contrast administration.  The axial CT data  set was used to generate high resolution reformatted images in the  coronal and sagittal planes to facilitate diagnostic accuracy and  treatment planning.      Radiation dose reduction techniques were utilized per ALARA protocol.  Automated exposure control was initiated through either or [employer] or  DoseRight software packages by  protocol.       1527.61733 mGy.cm     COMPARISON: None      FINDINGS: Patient has had a prior tibia and hindfoot hardware fixation.  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 Metallic streak artifact limits evaluation.  Fracture lines are still present. Some soft tissue swelling may  represent cellulitis or edema.       Impression:       Patient has had a prior tibia and hindfoot hardware  fixation. 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 Metallic streak artifact limits  evaluation. Fracture lines are still present. Some soft tissue swelling  may represent cellulitis or edema.     This report was finalized on 3/17/2019 9:07 AM by Dr. Harrison Biswas MD.        Renal  Limited [[id number]] Collected:  03/17/19 0905     Updated:  03/17/19 0907    Narrative:       EXAMINATION: US RENAL LIMITED-      CLINICAL INDICATION:     worsening kidney function; R73.9-Hyperglycemia,  unspecified; R31.29-Other microscopic hematuria     TECHNIQUE: Multiplanar gray scale sonographic imaging of the kidneys.      COMPARISON: NONE      FINDINGS: Both kidneys are normal in size and echogenicity. There is no  renal mass, stone, or hydronephrosis seen in either kidney. The right  kidney measures    [phone] cm in length; the left kidney  measures     9.68681392877 cm in length.        Impression:       Unremarkable bilateral renal ultrasound.      This report was finalized on 3/17/2019 9:05 AM by Dr. Harrison Biswas MD.       XR Chest 1 View [[id number]] Collected:  03/16/19 0851     Updated:  03/16/19 0854    Narrative:       EXAMINATION: XR CHEST 1 VW-      CLINICAL INDICATION:     fever     TECHNIQUE:  XR CHEST 1 VW-      COMPARISON: NONE      FINDINGS: Left PICC with tip in SVC.  The lungs remain aerated.  Heart and mediastinum contours are unremarkable.  No pleural effusion.  No pneumothorax.   Bony and soft tissue structures are unremarkable.       Impression:       No radiographic evidence of acute cardiac or pulmonary  disease.     This report was finalized on 3/16/2019 8:52 AM by Dr. Harrison Biswas MD.       CT Head Without Contrast [[id number]] Collected:  03/16/19 0851     Updated:  03/16/19 0854    Narrative:          EXAMINATION: CT HEAD WO CONTRAST-      CLINICAL INDICATION:     Confusion/delirium, altered LOC, unexplained;  R73.9-Hyperglycemia, unspecified; R31.29-Other microscopic hematuria     TECHNIQUE: Contiguous axial CT images of the head were obtained without  contrast administration.      Radiation dose reduction techniques were utilized per ALARA protocol.  Automated exposure control was initiated through either or [employer] or  DoseRight software packages by   protocol.       416.041522 mGy.cm     COMPARISON:  None.       FINDINGS: Generalized cerebral atrophy is present. There is no mass  effect, midline displacement, or hydrocephalus. There are patchy areas  of decreased density within the periventricular white matter which  likely reflect chronic small vessel ischemic changes. There is no CT  evidence of acute infarct or hemorrhage. Bone windows reveal no osseous  abnormalities or fractures.        Impression:       Atrophy and chronic small vessel ischemic change, but there  are no acute intracranial abnormalities identified.         This report was finalized on 3/16/2019 8:51 AM by Dr. Harrison Biswas MD.               Assessment/Plan       1.  Acute renal failure on chronic kidney disease stage III: Patient baseline creatinine is around 1.1- went up to 1.7 now 1.3 patient has proteinuria of 4 g so will repeat 24-hour urine protein and decide further looks like most likely patient is intravascular depleted with high sugars ultrasound of the kidneys are normal will DC IV fluids as the patient's kidney function is much better    2.  Hyperglycemia:  Getting better     3.  Confusion: Patient had confusion at admission but now feeling better     4.  Fever:  Better , patient is currently continued on cefepime and vancomycin    5.  Systolic congestive heart failure: EF is 45% with diastolic congestive heart failure also will use Lasix as needed         Discussed with patient      S Crispin Burks MD  03/19/19  7:05 AM

## 2019-03-19 NOTE — PROGRESS NOTES
Antibiotic length of therapy :    Cefepime   Day 4    Vancomycin  Day 4          The patient continues on simutaneous lexapro and prozac ?  Will check ekg in am for QTc value.

## 2019-03-19 NOTE — NURSING NOTE
Consult received for left inner ankle  Small amount of discoloration noted  No evidence of pressure injury

## 2019-03-20 VITALS
OXYGEN SATURATION: 97 % | BODY MASS INDEX: 21.67 KG/M2 | HEIGHT: 65 IN | RESPIRATION RATE: 18 BRPM | TEMPERATURE: 97.5 F | DIASTOLIC BLOOD PRESSURE: 82 MMHG | HEART RATE: 82 BPM | WEIGHT: 130.1 LBS | SYSTOLIC BLOOD PRESSURE: 157 MMHG

## 2019-03-20 PROBLEM — G93.41 METABOLIC ENCEPHALOPATHY: Status: ACTIVE | Noted: 2019-03-20

## 2019-03-20 PROBLEM — L03.90 CELLULITIS: Status: ACTIVE | Noted: 2019-03-20

## 2019-03-20 LAB
ALBUMIN SERPL-MCNC: 2.69 G/DL (ref 3.5–5.2)
ALBUMIN/GLOB SERPL: 1 G/DL
ALP SERPL-CCNC: 101 U/L (ref 39–117)
ALT SERPL W P-5'-P-CCNC: 8 U/L (ref 1–33)
ANION GAP SERPL CALCULATED.3IONS-SCNC: 11.6 MMOL/L
AST SERPL-CCNC: 13 U/L (ref 1–32)
BASOPHILS # BLD AUTO: 0.02 10*3/MM3 (ref 0–0.2)
BASOPHILS NFR BLD AUTO: 0.4 % (ref 0–1.5)
BILIRUB SERPL-MCNC: 0.2 MG/DL (ref 0.2–1.2)
BUN BLD-MCNC: 19 MG/DL (ref 8–23)
BUN/CREAT SERPL: 18.4 (ref 7–25)
CALCIUM SPEC-SCNC: 8.3 MG/DL (ref 8.6–10.5)
CHLORIDE SERPL-SCNC: 106 MMOL/L (ref 98–107)
CO2 SERPL-SCNC: 23.4 MMOL/L (ref 22–29)
COLLECT DURATION TIME UR: 24 HRS
CREAT BLD-MCNC: 1.03 MG/DL (ref 0.57–1)
DEPRECATED RDW RBC AUTO: 41.9 FL (ref 37–54)
EOSINOPHIL # BLD AUTO: 0.19 10*3/MM3 (ref 0–0.4)
EOSINOPHIL NFR BLD AUTO: 4.2 % (ref 0.3–6.2)
ERYTHROCYTE [DISTWIDTH] IN BLOOD BY AUTOMATED COUNT: 13.3 % (ref 12.3–15.4)
GFR SERPL CREATININE-BSD FRML MDRD: 54 ML/MIN/1.73
GLOBULIN UR ELPH-MCNC: 2.7 GM/DL
GLUCOSE BLD-MCNC: 208 MG/DL (ref 65–99)
GLUCOSE BLDC GLUCOMTR-MCNC: 128 MG/DL (ref 70–130)
GLUCOSE BLDC GLUCOMTR-MCNC: 217 MG/DL (ref 70–130)
GLUCOSE BLDC GLUCOMTR-MCNC: 250 MG/DL (ref 70–130)
HCT VFR BLD AUTO: 33.5 % (ref 34–46.6)
HGB BLD-MCNC: 10.9 G/DL (ref 12–15.9)
IMM GRANULOCYTES # BLD AUTO: 0 10*3/MM3 (ref 0–0.05)
IMM GRANULOCYTES NFR BLD AUTO: 0 % (ref 0–0.5)
LYMPHOCYTES # BLD AUTO: 1.48 10*3/MM3 (ref 0.7–3.1)
LYMPHOCYTES NFR BLD AUTO: 32.7 % (ref 19.6–45.3)
MAGNESIUM SERPL-MCNC: 1.9 MG/DL (ref 1.6–2.4)
MCH RBC QN AUTO: 28.9 PG (ref 26.6–33)
MCHC RBC AUTO-ENTMCNC: 32.5 G/DL (ref 31.5–35.7)
MCV RBC AUTO: 88.9 FL (ref 79–97)
MONOCYTES # BLD AUTO: 0.29 10*3/MM3 (ref 0.1–0.9)
MONOCYTES NFR BLD AUTO: 6.4 % (ref 5–12)
NEUTROPHILS # BLD AUTO: 2.55 10*3/MM3 (ref 1.4–7)
NEUTROPHILS NFR BLD AUTO: 56.3 % (ref 42.7–76)
PLATELET # BLD AUTO: 128 10*3/MM3 (ref 140–450)
PMV BLD AUTO: 10.7 FL (ref 6–12)
POTASSIUM BLD-SCNC: 3.3 MMOL/L (ref 3.5–5.2)
PROT 24H UR-MRATE: 1773 MG/24HOURS (ref 0–150)
PROT SERPL-MCNC: 5.4 G/DL (ref 6–8.5)
PROT UR-MCNC: 98.5 MG/DL
RBC # BLD AUTO: 3.77 10*6/MM3 (ref 3.77–5.28)
SODIUM BLD-SCNC: 141 MMOL/L (ref 136–145)
SPECIMEN VOL 24H UR: 1800 ML
WBC NRBC COR # BLD: 4.53 10*3/MM3 (ref 3.4–10.8)

## 2019-03-20 PROCEDURE — 93005 ELECTROCARDIOGRAM TRACING: CPT | Performed by: INTERNAL MEDICINE

## 2019-03-20 PROCEDURE — 85025 COMPLETE CBC W/AUTO DIFF WBC: CPT | Performed by: NURSE PRACTITIONER

## 2019-03-20 PROCEDURE — 84156 ASSAY OF PROTEIN URINE: CPT | Performed by: INTERNAL MEDICINE

## 2019-03-20 PROCEDURE — 97162 PT EVAL MOD COMPLEX 30 MIN: CPT

## 2019-03-20 PROCEDURE — 63710000001 INSULIN ASPART PER 5 UNITS: Performed by: NURSE PRACTITIONER

## 2019-03-20 PROCEDURE — 82962 GLUCOSE BLOOD TEST: CPT

## 2019-03-20 PROCEDURE — 93010 ELECTROCARDIOGRAM REPORT: CPT | Performed by: INTERNAL MEDICINE

## 2019-03-20 PROCEDURE — 83735 ASSAY OF MAGNESIUM: CPT | Performed by: NURSE PRACTITIONER

## 2019-03-20 PROCEDURE — 97530 THERAPEUTIC ACTIVITIES: CPT

## 2019-03-20 PROCEDURE — 80053 COMPREHEN METABOLIC PANEL: CPT | Performed by: PHYSICIAN ASSISTANT

## 2019-03-20 PROCEDURE — 99232 SBSQ HOSP IP/OBS MODERATE 35: CPT | Performed by: INTERNAL MEDICINE

## 2019-03-20 PROCEDURE — 97116 GAIT TRAINING THERAPY: CPT

## 2019-03-20 PROCEDURE — 81050 URINALYSIS VOLUME MEASURE: CPT | Performed by: INTERNAL MEDICINE

## 2019-03-20 PROCEDURE — 99239 HOSP IP/OBS DSCHRG MGMT >30: CPT | Performed by: PHYSICIAN ASSISTANT

## 2019-03-20 PROCEDURE — 63710000001 INSULIN DETEMIR PER 5 UNITS: Performed by: PHYSICIAN ASSISTANT

## 2019-03-20 RX ORDER — AMLODIPINE BESYLATE 5 MG/1
5 TABLET ORAL
Qty: 30 TABLET | Refills: 0 | Status: SHIPPED | OUTPATIENT
Start: 2019-03-21 | End: 2019-03-20 | Stop reason: HOSPADM

## 2019-03-20 RX ORDER — POTASSIUM CHLORIDE 750 MG/1
40 TABLET, FILM COATED, EXTENDED RELEASE ORAL AS NEEDED
Status: DISCONTINUED | OUTPATIENT
Start: 2019-03-20 | End: 2019-03-20 | Stop reason: HOSPADM

## 2019-03-20 RX ORDER — POTASSIUM CHLORIDE 20 MEQ/1
40 TABLET, EXTENDED RELEASE ORAL EVERY 4 HOURS
Status: COMPLETED | OUTPATIENT
Start: 2019-03-20 | End: 2019-03-20

## 2019-03-20 RX ORDER — ATORVASTATIN CALCIUM 40 MG/1
40 TABLET, FILM COATED ORAL DAILY
Qty: 30 TABLET | Refills: 0 | Status: ON HOLD | OUTPATIENT
Start: 2019-03-21 | End: 2019-08-06

## 2019-03-20 RX ORDER — POTASSIUM CHLORIDE 7.45 MG/ML
10 INJECTION INTRAVENOUS
Status: DISCONTINUED | OUTPATIENT
Start: 2019-03-20 | End: 2019-03-20 | Stop reason: HOSPADM

## 2019-03-20 RX ORDER — MAGNESIUM SULFATE HEPTAHYDRATE 40 MG/ML
4 INJECTION, SOLUTION INTRAVENOUS AS NEEDED
Status: DISCONTINUED | OUTPATIENT
Start: 2019-03-20 | End: 2019-03-20 | Stop reason: HOSPADM

## 2019-03-20 RX ORDER — CEFDINIR 300 MG/1
300 CAPSULE ORAL EVERY 12 HOURS SCHEDULED
Qty: 10 CAPSULE | Refills: 0 | Status: SHIPPED | OUTPATIENT
Start: 2019-03-20 | End: 2019-03-25

## 2019-03-20 RX ORDER — MAGNESIUM SULFATE HEPTAHYDRATE 40 MG/ML
4 INJECTION, SOLUTION INTRAVENOUS ONCE
Status: COMPLETED | OUTPATIENT
Start: 2019-03-20 | End: 2019-03-20

## 2019-03-20 RX ORDER — METOPROLOL SUCCINATE 25 MG/1
25 TABLET, EXTENDED RELEASE ORAL
Status: DISCONTINUED | OUTPATIENT
Start: 2019-03-20 | End: 2019-03-20 | Stop reason: HOSPADM

## 2019-03-20 RX ORDER — MAGNESIUM SULFATE HEPTAHYDRATE 40 MG/ML
2 INJECTION, SOLUTION INTRAVENOUS AS NEEDED
Status: DISCONTINUED | OUTPATIENT
Start: 2019-03-20 | End: 2019-03-20 | Stop reason: HOSPADM

## 2019-03-20 RX ORDER — HYDRALAZINE HYDROCHLORIDE 25 MG/1
25 TABLET, FILM COATED ORAL EVERY 8 HOURS SCHEDULED
Status: DISCONTINUED | OUTPATIENT
Start: 2019-03-20 | End: 2019-03-20 | Stop reason: HOSPADM

## 2019-03-20 RX ORDER — FUROSEMIDE 20 MG/1
20 TABLET ORAL DAILY PRN
Qty: 30 TABLET | Refills: 0 | Status: ON HOLD | OUTPATIENT
Start: 2019-03-20 | End: 2019-08-06

## 2019-03-20 RX ORDER — POTASSIUM CHLORIDE 750 MG/1
10 TABLET, FILM COATED, EXTENDED RELEASE ORAL DAILY PRN
Qty: 30 TABLET | Refills: 0 | Status: ON HOLD | OUTPATIENT
Start: 2019-03-20 | End: 2019-08-06

## 2019-03-20 RX ORDER — MULTIVITAMIN
1 TABLET ORAL DAILY
Qty: 30 TABLET | Refills: 0 | Status: ON HOLD | OUTPATIENT
Start: 2019-03-21 | End: 2019-08-06

## 2019-03-20 RX ORDER — AMLODIPINE BESYLATE 5 MG/1
5 TABLET ORAL
Status: DISCONTINUED | OUTPATIENT
Start: 2019-03-21 | End: 2019-03-20

## 2019-03-20 RX ORDER — METOPROLOL SUCCINATE 25 MG/1
25 TABLET, EXTENDED RELEASE ORAL
Qty: 30 TABLET | Refills: 0 | Status: SHIPPED | OUTPATIENT
Start: 2019-03-20 | End: 2019-03-25

## 2019-03-20 RX ORDER — HYDRALAZINE HYDROCHLORIDE 25 MG/1
25 TABLET, FILM COATED ORAL EVERY 8 HOURS SCHEDULED
Qty: 90 TABLET | Refills: 0 | Status: ON HOLD | OUTPATIENT
Start: 2019-03-20 | End: 2019-08-06

## 2019-03-20 RX ORDER — POTASSIUM CHLORIDE 1.5 G/1.77G
40 POWDER, FOR SOLUTION ORAL AS NEEDED
Status: DISCONTINUED | OUTPATIENT
Start: 2019-03-20 | End: 2019-03-20 | Stop reason: HOSPADM

## 2019-03-20 RX ORDER — DOXYCYCLINE 100 MG/1
100 CAPSULE ORAL EVERY 12 HOURS SCHEDULED
Qty: 10 CAPSULE | Refills: 0 | Status: SHIPPED | OUTPATIENT
Start: 2019-03-20 | End: 2019-03-25

## 2019-03-20 RX ADMIN — HYDROCODONE BITARTRATE AND ACETAMINOPHEN 1 TABLET: 10; 325 TABLET ORAL at 16:22

## 2019-03-20 RX ADMIN — HYDROCODONE BITARTRATE AND ACETAMINOPHEN 1 TABLET: 10; 325 TABLET ORAL at 08:01

## 2019-03-20 RX ADMIN — FLUDROCORTISONE ACETATE 0.1 MG: 0.1 TABLET ORAL at 08:03

## 2019-03-20 RX ADMIN — HYDROCODONE BITARTRATE AND ACETAMINOPHEN 1 TABLET: 10; 325 TABLET ORAL at 00:55

## 2019-03-20 RX ADMIN — GABAPENTIN 600 MG: 300 CAPSULE ORAL at 08:01

## 2019-03-20 RX ADMIN — ATORVASTATIN CALCIUM 40 MG: 40 TABLET, FILM COATED ORAL at 08:01

## 2019-03-20 RX ADMIN — POTASSIUM CHLORIDE 40 MEQ: 1500 TABLET, EXTENDED RELEASE ORAL at 11:31

## 2019-03-20 RX ADMIN — POTASSIUM CHLORIDE 40 MEQ: 1500 TABLET, EXTENDED RELEASE ORAL at 15:59

## 2019-03-20 RX ADMIN — DOXYCYCLINE 100 MG: 100 CAPSULE ORAL at 08:01

## 2019-03-20 RX ADMIN — ROPINIROLE HYDROCHLORIDE 0.5 MG: 0.25 TABLET, FILM COATED ORAL at 08:01

## 2019-03-20 RX ADMIN — MAGNESIUM SULFATE HEPTAHYDRATE 4 G: 40 INJECTION, SOLUTION INTRAVENOUS at 11:31

## 2019-03-20 RX ADMIN — OFLOXACIN 50000 UNITS: 300 TABLET, COATED ORAL at 08:01

## 2019-03-20 RX ADMIN — CLONIDINE HYDROCHLORIDE 0.1 MG: 0.1 TABLET ORAL at 00:55

## 2019-03-20 RX ADMIN — SODIUM CHLORIDE, PRESERVATIVE FREE 3 ML: 5 INJECTION INTRAVENOUS at 08:03

## 2019-03-20 RX ADMIN — INSULIN DETEMIR 15 UNITS: 100 INJECTION, SOLUTION SUBCUTANEOUS at 08:09

## 2019-03-20 RX ADMIN — CLOPIDOGREL 75 MG: 75 TABLET, FILM COATED ORAL at 08:02

## 2019-03-20 RX ADMIN — INSULIN ASPART 4 UNITS: 100 INJECTION, SOLUTION INTRAVENOUS; SUBCUTANEOUS at 11:31

## 2019-03-20 RX ADMIN — PANTOPRAZOLE SODIUM 40 MG: 40 INJECTION, POWDER, FOR SOLUTION INTRAVENOUS at 08:01

## 2019-03-20 RX ADMIN — FLUOXETINE 20 MG: 20 CAPSULE ORAL at 08:01

## 2019-03-20 RX ADMIN — CEFDINIR 300 MG: 300 CAPSULE ORAL at 08:03

## 2019-03-20 RX ADMIN — INSULIN ASPART 4 UNITS: 100 INJECTION, SOLUTION INTRAVENOUS; SUBCUTANEOUS at 16:23

## 2019-03-20 RX ADMIN — Medication 1 TABLET: at 08:02

## 2019-03-20 NOTE — PLAN OF CARE
Problem: Fall Risk (Adult)  Goal: Identify Related Risk Factors and Signs and Symptoms   03/15/19 2345   Fall Risk (Adult)   Related Risk Factors (Fall Risk) confusion/agitation;age-related changes;sleep pattern alteration;slippery/uneven surfaces;environment unfamiliar   Signs and Symptoms (Fall Risk) presence of risk factors     Goal: Absence of Fall   03/18/19 1617   Fall Risk (Adult)   Absence of Fall making progress toward outcome       Problem: Patient Care Overview  Goal: Plan of Care Review   03/19/19 0958 03/19/19 2110   Coping/Psychosocial   Plan of Care Reviewed With --  patient   Plan of Care Review   Progress improving --    OTHER   Outcome Summary Patient is undergoing stress test today --      Goal: Individualization and Mutuality  Outcome: Ongoing (interventions implemented as appropriate)    Goal: Discharge Needs Assessment   03/16/19 1404   Discharge Needs Assessment   Readmission Within the Last 30 Days no previous admission in last 30 days   Concerns to be Addressed denies needs/concerns at this time   Patient/Family Anticipates Transition to home   Patient/Family Anticipated Services at Transition home health care  ([facility] )   Transportation Concerns car, none   Transportation Anticipated family or friend will provide   Anticipated Changes Related to Illness none   Equipment Needed After Discharge glucometer;other (see comments)  (Patient needs a new glucometer and B/P cuff and her insurance is already sending these items to her. )   Current Discharge Risk lives alone   Disability   Equipment Currently Used at Home commode;wheelchair;glucometer;shower chair     Goal: Interprofessional Rounds/Family Conf   03/15/19 2345   Interdisciplinary Rounds/Family Conf   Participants nursing;patient;physician;pharmacy       Problem: Skin Injury Risk (Adult)  Goal: Skin Health and Integrity   03/19/19 0958   Skin Injury Risk (Adult)   Skin Health and Integrity making progress toward outcome        Problem: Diabetes, Type 2 (Adult)  Goal: Signs and Symptoms of Listed Potential Problems Will be Absent, Minimized or Managed (Diabetes, Type 2)   03/19/19 0958   Goal/Outcome Evaluation   Problems Assessed (Type 2 Diabetes) all   Problems Present (Type 2 Diabetes) hyperglycemia;situational response       Problem: Cardiac: ACS (Acute Coronary Syndrome) (Adult)  Goal: Signs and Symptoms of Listed Potential Problems Will be Absent, Minimized or Managed (Cardiac: ACS)   03/19/19 0958   Goal/Outcome Evaluation   Problems Assessed (Acute Coronary Syndrome) all   Problems Present (Acute Coronary Syn) heart failure/shock

## 2019-03-20 NOTE — THERAPY EVALUATION
Acute Care - Physical Therapy Initial Evaluation  [facility]     Patient Name: Reny Elena  : 1960  MRN: [id number]  Today's Date: 3/20/2019   Onset of Illness/Injury or Date of Surgery: 19  Date of Referral to PT: 19  Referring Physician: Dr. Crawford      Admit Date: 3/15/2019    Visit Dx:     ICD-10-CM ICD-9-CM   1. Hyperglycemia R73.9 790.29   2. Hematuria, microscopic R31.29 599.72   3. Acute kidney injury (CMS/[facility]) N17.9 584.9     Patient Active Problem List   Diagnosis   • Severe sepsis (CMS/[facility])   • Iron deficiency anemia   • Type 2 diabetes mellitus with hyperglycemia, with long-term current use of insulin (CMS/[facility])   • Wound of right ankle   • Hyperglycemia   • Confusion   • Cellulitis   • Metabolic encephalopathy     Past Medical History:   Diagnosis Date   • Arthritis    • Closed fracture of right fibula and tibia 2018   • Depression    • Diabetic ulcer of left foot associated with type 2 diabetes mellitus (CMS/HCC) 2017   • Diastolic dysfunction    • Essential hypertension    • Hyperlipidemia    • Iron deficiency anemia 2018   • PAD (peripheral artery disease) (CMS/[facility])    • Type 2 diabetes mellitus with hyperglycemia, with long-term current use of insulin (CMS/[facility]) 2018     Past Surgical History:   Procedure Laterality Date   • BACK SURGERY      Post spinal diskectomy, osteophytectomy in one lumbar interspace   • CATARACT EXTRACTION      Left    •  SECTION     • DENTAL PROCEDURE     • HYSTERECTOMY     • INCISION AND DRAINAGE LEG Left 4/15/2017    Procedure: INCISION AND DRAINAGE LOWER EXTREMITY;  Surgeon: Basilio Morris MD;  Location: [facility] OR;  Service:    • INCISION AND DRAINAGE LEG Left 2017    Procedure: Irrigation and Debridment abcess diabetic wound left foot with deep culture;  Surgeon: Basilio Morris MD;  Location: [facility] OR;  Service:    • KNEE ARTHROSCOPY Right    • ORIF TIBIA FRACTURE Right 2018    Procedure: TIBIAL  FRACTURE  OPEN REDUCTION INTERNAL FIXATION;  Surgeon: Jung Barragan MD;  Location: [facility];  Service: Orthopedics   • TOE AMPUTATION Right     5th   • TUBAL ABDOMINAL LIGATION          PT ASSESSMENT (last 12 hours)      Physical Therapy Evaluation     Row Name 03/20/19 1500          PT Evaluation Time/Intention    Subjective Information  no complaints  -BC     Document Type  evaluation  -BC     Mode of Treatment  physical therapy;individual therapy  -BC     Patient Effort  adequate  -BC     Row Name 03/20/19 1600 03/20/19 1500       General Information    Patient Profile Reviewed?  --  yes  -BC    Onset of Illness/Injury or Date of Surgery  --  03/20/19  -BC    Referring Physician  --  Dr. Crawford  -BC    Patient Observations  cooperative;agree to therapy  -BC  --    Prior Level of Function  independent:  -BC  --    Equipment Currently Used at Home  commode;wheelchair;glucometer;shower chair  -BC  --    Risks Reviewed  patient:;LOB;nausea/vomiting;dizziness;increased discomfort;change in vital signs;increased drainage;lines disloged  -BC  --    Benefits Reviewed  patient:;improve function;increase independence;increase strength;increase balance;decrease pain;decrease risk of DVT;improve skin integrity;increase knowledge  -BC  --    Row Name 03/20/19 1600          Relationship/Environment    Primary Source of Support/Comfort  spouse  -BC     Lives With  alone  -BC     Family Caregiver if Needed  none  -BC     Row Name 03/20/19 1600          Resource/Environmental Concerns    Current Living Arrangements  home/apartment/condo  -BC     Resource/Environmental Concerns  none  -BC     Transportation Concerns  car, none  -BC     Row Name 03/20/19 1600          Cognitive Assessment/Intervention- PT/OT    Orientation Status (Cognition)  oriented x 3  -BC     Follows Commands (Cognition)  WFL  -BC     Row Name 03/20/19 1600          Bed Mobility Assessment/Treatment    Bed Mobility Assessment/Treatment  bed mobility (all) activities   -BC     [place] Level (Bed Mobility)  minimum assist (75% patient effort);moderate assist (50% patient effort)  -BC     Assistive Device (Bed Mobility)  bed rails  -BC     Row Name 03/20/19 1600          Transfer Assessment/Treatment    Transfer Assessment/Treatment  sit-stand transfer;stand-sit transfer  -BC     Maintains Weight-bearing Status (Transfers)  able to maintain  -BC     Sit-Stand [place] (Transfers)  minimum assist (75% patient effort);moderate assist (50% patient effort)  -BC     Stand-Sit [place] (Transfers)  minimum assist (75% patient effort)  -BC     Row Name 03/20/19 1600          Sit-Stand Transfer    Assistive Device (Sit-Stand Transfers)  walker, front-wheeled  -BC     Row Name 03/20/19 1600          Stand-Sit Transfer    Assistive Device (Stand-Sit Transfers)  walker, front-wheeled  -BC     Row Name 03/20/19 1600          Gait/Stairs Assessment/Training    Gait/Stairs Assessment/Training  gait/ambulation independence  -BC     [place] Level (Gait)  minimum assist (75% patient effort);moderate assist (50% patient effort);2 person assist  -BC     Assistive Device (Gait)  walker, front-wheeled  -BC     Distance in Feet (Gait)  40  -BC     Row Name 03/20/19 1600          General ROM    GENERAL ROM COMMENTS  BLE ROM WFL  -BC     Row Name 03/20/19 1600          MMT (Manual Muscle Testing)    General MMT Comments  BLE MMT 3+/5  -BC     Row Name             Wound 12/28/18 2057 Right leg incision    Wound - Properties Group Date first assessed: 12/28/18  -JH Time first assessed: 2057 -JH Side: Right  -JH Location: leg  -JH Type: incision  -JH    Row Name             Wound 03/17/19 1900 Right heel pressure injury    Wound - Properties Group Date first assessed: 03/17/19  -CM Time first assessed: 1900  -CM Side: Right  -CM Location: heel  -CM Type: pressure injury  -CM Stage, Pressure Injury: unstageable  -CM    Row Name             Wound 03/19/19 2110 Bilateral medial coccyx pressure  injury    Wound - Properties Group Date first assessed: 03/19/19  -AN Time first assessed: 2110  -AN Side: Bilateral  -AN Orientation: medial  -AN Location: coccyx  -AN Type: pressure injury  -AN Stage, Pressure Injury: Stage 1  -AN, Possible stage 1 wound care nurse to assess     Row Name 03/20/19 1600          Physical Therapy Clinical Impression    Date of Referral to PT  03/19/19  -BC     Functional Level at Time of Evaluation (PT Clinical Impression)  adequate  -BC     Criteria for Skilled Interventions Met (PT Clinical Impression)  no;other (see comments) plans to discharge  -BC     Row Name 03/20/19 1600          Positioning and Restraints    Pre-Treatment Position  in bed  -BC     Post Treatment Position  bed  -BC     In Bed  notified nsg;supine;call light within reach;encouraged to call for assist;side rails up x3  -BC       User Key  (r) = Recorded By, (t) = Taken By, (c) = Cosigned By    Initials Name Provider Type    BC Sherrell Chavez PT Physical Therapist    Megan Alvarez, RN Registered Nurse    Negra Ortiz RN Registered Nurse    Chin Herrera RN Registered Nurse        Physical Therapy Education     Title: PT OT SLP Therapies (Done)     Topic: Physical Therapy (Done)     Point: Mobility training (Done)     Learning Progress Summary           Patient Acceptance, E, VU by BC at 3/20/2019  4:14 PM                   Point: Home exercise program (Done)     Learning Progress Summary           Patient Acceptance, E, VU by BC at 3/20/2019  4:14 PM                   Point: Body mechanics (Done)     Learning Progress Summary           Patient Acceptance, E, VU by BC at 3/20/2019  4:14 PM                   Point: Precautions (Done)     Learning Progress Summary           Patient Acceptance, E, VU by BC at 3/20/2019  4:14 PM                               User Key     Initials Effective Dates Name Provider Type Discipline    BC 03/14/16 -  Sherrell Chavez PT Physical Therapist  PT              PT Recommendation and Plan        Outcome Measures     Row Name 03/20/19 1600             How much help from another person do you currently need...    Turning from your back to your side while in flat bed without using bedrails?  3  -BC      Moving from lying on back to sitting on the side of a flat bed without bedrails?  3  -BC      Moving to and from a bed to a chair (including a wheelchair)?  2  -BC      Standing up from a chair using your arms (e.g., wheelchair, bedside chair)?  2  -BC      Climbing 3-5 steps with a railing?  2  -BC      To walk in hospital room?  2  -BC      AM-PAC 6 Clicks Score  14  -BC         Functional Assessment    Outcome Measure Options  AM-PAC 6 Clicks Basic Mobility (PT)  -BC        User Key  (r) = Recorded By, (t) = Taken By, (c) = Cosigned By    Initials Name Provider Type    BC Sherrell Chavez PT Physical Therapist         Time Calculation:   PT Charges     Row Name 03/20/19 1616 03/20/19 1608          Time Calculation    PT Received On  03/20/19  -BC  03/20/19  -BC        Time Calculation- PT    Total Timed Code Minutes- PT  60 minute(s)  -BC  --        Timed Charges    59434 - Gait Training Minutes   15  -BC  --     78952 - PT Therapeutic Activity Minutes  15  -BC  --       User Key  (r) = Recorded By, (t) = Taken By, (c) = Cosigned By    Initials Name Provider Type    BC Sherrell Chavez, PT Physical Therapist        Therapy Charges for Today     Code Description Service Date Service Provider Modifiers Qty    15262515572 HC GAIT TRAINING EA 15 MIN 3/20/2019 Sherrell Chavez, PT GP 1    59219594655 HC PT THERAPEUTIC ACT EA 15 MIN 3/20/2019 Sherrell Chavez, PT GP 1    44959281511 HC PT THER SUPP EA 15 MIN 3/20/2019 Sherrell Chavez, PT GP 4    29070923103 HC PT EVAL MOD COMPLEXITY 2 3/20/2019 Sherrell Chavez, PT GP 1          PT G-Codes  Outcome Measure Options: AM-PAC 6 Clicks Basic Mobility (PT)  AM-PAC 6 Clicks Score: 14      Sherrell Chavez  PT  3/20/2019

## 2019-03-20 NOTE — DISCHARGE SUMMARY
[facility] Medicine Services  DISCHARGE SUMMARY    Date of Admission: 3/15/2019    Date of Discharge:  3/20/2019    PCP: Yandel Paulson MD    Discharging Provider: Dr. Mark Crawford/ Ashely Acevedo PA-C     Admission Diagnosis:   Please see admission H&P    Discharge Diagnosis:   · Severe sepsis with metabolic encephalopathy  · Right lower extremity cellulitis  · Acute kidney injury on chronic kidney disease stage III with proteinuria  · Metabolic encephalopathy, resolved  · Abnormal EKG with elevated troponin  · Acute hypoxic respiratory failure  · Diabetes mellitus type 2, insulin-dependent  · Prolonged QTc  · Essential hypertension  · Normocytic anemia  · Iron deficiency  · History of peptic ulcers  · Dyslipidemia  · Hypokalemia  · Mild hypomagnesemia      Procedures/Testing Performed:  · Stress test 3/19/19 with reading per Dr. Crescencio Helton:  Interpretation Summary     · Myocardial perfusion imaging indicates a normal myocardial perfusion study with no evidence of ischemia.  · Normal LV cavity size. Abnormal LV wall motion consistent with mild apical hypokinesis.  · Left ventricular ejection fraction is borderline normal (Calculated EF = 52%).  · Impressions are consistent with a low risk study.     · Echocardiogram 3/18/19 with reading per Dr. Crescencio Helton:  Interpretation Summary     · Normal left ventricular cavity size and wall thickness noted.  · Left ventricular diastolic dysfunction (grade I) consistent with impaired relaxation.  · Left ventricular systolic function is mildly decreased.  · Estimated EF appears to be in the range of 46 - 50%.  · The aortic valve is abnormal in structure. The valve exhibits sclerosis. Mild aortic valve regurgitation is present. No aortic valve stenosis is present.  · The mitral valve is normal in structure. Mild mitral valve regurgitation is present. No significant mitral valve stenosis is present.  · The tricuspid valve is normal.  No evidence of tricuspid valve stenosis is present. No tricuspid valve regurgitation is present.  · There is no evidence of pericardial effusion.  · Comments: LV systolic function has decreased since 11/22/18(LVEF decreased from 56-60% then to 46-50% now)          Consults:   Consults     Date and Time Order Name Status Description    3/18/2019 1115 Inpatient Cardiology Consult Completed     3/17/2019 8171 Inpatient Nephrology Consult              HPI     History of Present Illness:  Reny Elena is a 59 y.o. female with past medical history of closed fracture of the right fibula and tibia, arthritis, depression, diabetic ulcer of left foot, diastolic dysfunction, peripheral artery disease, iron deficiency anemia, type 2 diabetes mellitus with hyperglycemia, and hyperlipidemia.  She presented to the emergency department of [facility] on March 15 with reports of apparently nausea, vomiting, and fever.  She was also reportedly confused upon admission.  Her fever was found to be 103.3 °F, heart rate 108, lactic acid 2.6, and arterial oxygen saturation of 87.2% she was initially admitted to the observation unit secondary to reported hyperglycemia with glucose elevated at 494 in addition to underlying confusion.     Of note, prior to admission she was receiving IV Rocephin via PICC line at home which she had reportedly completed due to infected right foot ulcer.    Hospital Course     Hospital Course  Reny Elena was admitted as outlined in above HPI.  CT of her head was negative.  There was concern for right lower extremity cellulitis with possible infected left heel ulcer.  IV antibiotics with cefepime and vancomycin were started while in the observation unit.  Finally, while in the observation unit, she did receive a consultation for nephrology due to acute renal failure on chronic kidney disease stage III with creatinine rise from 1.2 on 315-1.7 on March 17, 2019.  This was felt to most likely be  secondary to vascular depletion.  She did respond well to IV fluids during admission    On March 17, 2019 she was transferred to the hospital medicine service from the observation unit as it that she would require a longer hospital stay for IV antibiotics.  She did have a CT scan of the right lower extremity that did demonstrate some soft tissue swelling felt to represent cellulitis or edema.    Due to troponin T elevation of 0.105 during admission in addition to EKG with T wave inversion concerning for anterior wall ischemia cardiology consultation was placed.  She was evaluated by Dr. Helton.  An echocardiogram was performed and this did reveal a decrease in her ejection fraction. She did undergo stress test that was found to be low risk study. Due to cardiomyopathy, hydralazine 25mg TID was added in addition to Metoprolol XL 25mg daily with home Amlodipine and Metoprolol tartrate discontinued.  She did have some hypotension upon admission that did resolve while hospitalized followed by hypertension and resumed home medications that were ultimately changed to the above mentioned prior to discharge. Plavix was added during admission with aspirin held due to reported gastric ulcers. Given normal stress test, she was not discharged on Plavix as discussed with Dr. Helton by Dr. Crawford.      Lexapro was stopped while hospitalized 2/2 prolonged QTc, as she was already on Prozac as well.      She was okay for discharge from the cardiac standpoint with follow-up in 2-3 weeks.      As for her RLE cellulitis, She was de escalated to oral doxycycline and omnicef, which she tolerated well.      Discussed with EVERETT Mello on day of discharge.     Follow-up treatments were arranged as outlined within this document below.    Pertinent Laboratory and Radiology Results     Pertinent Test Results:      Results from last 7 days   Lab Units 03/16/19  0018   PH, ARTERIAL pH units 7.419   PO2 ART mm Hg 53.7*   PCO2, ARTERIAL mm Hg  35.6   HCO3 ART mmol/L 22.5     Results from last 7 days   Lab Units 03/20/19  0109 03/19/19  0333 03/18/19  0055 03/17/19  1954 03/17/19  0416 03/16/19  0334 03/16/19  0053 03/15/19  1928   WBC 10*3/mm3 4.53 4.52 5.65  --  8.84 11.84* 11.21* 13.50*   HEMOGLOBIN g/dL 10.9* 11.6* 10.0* 10.4* 11.0* 13.9 13.8 13.9   HEMATOCRIT % 33.5* 35.6 31.1* 32.2* 33.8* 41.9 40.5 39.8   PLATELETS 10*3/mm3 128* 116* 131*  --  140 147 147 205   MCV fL 88.9 89.9 90.1  --  89.9 87.5 86.7 85.0   SODIUM mmol/L 141 138 138  --  139  --  135* 135*   POTASSIUM mmol/L 3.3* 4.9 4.0  --  3.8  --  3.8 4.1   CHLORIDE mmol/L 106 109* 110*  --  109*  --  98 93*   CO2 mmol/L 23.4 19.8* 21.1*  --  21.2*  --  18.1* 23.7   BUN mg/dL 19 19 28*  --  31*  --  19 19   CREATININE mg/dL 1.03* 1.34* 1.34*  --  1.70*  --  1.14* 1.22*   GLUCOSE mg/dL 208* 248* 231*  --  61*  --  301* 494*   CALCIUM mg/dL 8.3* 8.3* 7.8*  --  7.9*  --  8.7 9.5        Results from last 7 days   Lab Units 03/18/19  1249 03/18/19  0751 03/18/19 0055 03/17/19 1954   TROPONIN T ng/mL 0.118* 0.146* 0.125* 0.105*     Results from last 7 days   Lab Units 03/20/19  0109 03/19/19  0333 03/18/19  0055 03/17/19  0416 03/16/19  0551 03/16/19  0334 03/16/19  0053 03/15/19  1928   CRP mg/dL  --  0.44  --  1.64*  --   --  0.62*  --    LACTATE mmol/L  --   --   --   --  2.0  --  2.6*  --    WBC 10*3/mm3 4.53 4.52 5.65 8.84  --  11.84* 11.21* 13.50*     Results from last 7 days   Lab Units 03/20/19  0109 03/19/19 0333 03/18/19 0055 03/15/19  1928   BILIRUBIN mg/dL 0.2 0.2 <0.2 0.6   ALK PHOS U/L 101 113 103 159*   ALT (SGPT) U/L 8 12 9 10   AST (SGOT) U/L 13 22 18 18     Results from last 7 days   Lab Units 03/18/19  0055   CHOLESTEROL mg/dL 158   TRIGLYCERIDES mg/dL 223*   HDL CHOL mg/dL 43     Results from last 7 days   Lab Units 03/16/19  1042   HEMOGLOBIN A1C % 10.30*     Brief Urine Lab Results  (Last result in the past 365 days)      Color   Clarity   Blood   Leuk Est   Nitrite    Protein   CREAT   Urine HCG        03/17/19 1234             29.2                      Results for orders placed during the hospital encounter of 03/15/19   Adult Transthoracic Echo Complete W/ Cont if Necessary Per Protocol    Narrative · Normal left ventricular cavity size and wall thickness noted.  · Left ventricular diastolic dysfunction (grade I) consistent with   impaired relaxation.  · Left ventricular systolic function is mildly decreased.  · Estimated EF appears to be in the range of 46 - 50%.  · The aortic valve is abnormal in structure. The valve exhibits sclerosis.   Mild aortic valve regurgitation is present. No aortic valve stenosis is   present.  · The mitral valve is normal in structure. Mild mitral valve regurgitation   is present. No significant mitral valve stenosis is present.  · The tricuspid valve is normal. No evidence of tricuspid valve stenosis   is present. No tricuspid valve regurgitation is present.  · There is no evidence of pericardial effusion.  · Comments: LV systolic function has decreased since 11/22/18(LVEF   decreased from 56-60% then to 46-50% now)           Order Current Status    Blood Culture - Blood, Arm, Left Preliminary result    Blood Culture - Blood, Arm, Right Preliminary result            ----------------------------------------------------------------------------------------------------------------------  Ct Head Without Contrast    Result Date: 3/16/2019  Atrophy and chronic small vessel ischemic change, but there are no acute intracranial abnormalities identified.     This report was finalized on 3/16/2019 8:51 AM by Dr. Harrison Biswas MD.      Us Renal Limited    Result Date: 3/17/2019  Unremarkable bilateral renal ultrasound.  This report was finalized on 3/17/2019 9:05 AM by Dr. Harrison Biswas MD.      Xr Chest 1 View    Result Date: 3/16/2019  No radiographic evidence of acute cardiac or pulmonary disease.  This report was finalized on 3/16/2019 8:52 AM by   Harrison Biswas MD.      Ct Lower Extremity Right Without Contrast    Result Date: 3/17/2019  Patient has had a prior tibia and hindfoot hardware fixation. Some bony demineralization probably from disuse osteopenia predominantly noted of the hindfoot. Metallic streak artifact limits evaluation. Fracture lines are still present. Some soft tissue swelling may represent cellulitis or edema.  This report was finalized on 3/17/2019 9:07 AM by Dr. Harrison Biswsa MD.          Microbiology Results (last 10 days)     Procedure Component Value - Date/Time    Wound Culture - Wound, Leg, Right [[id number]] Collected:  03/16/19 0553    Lab Status:  Final result Specimen:  Wound from Leg, Right Updated:  03/19/19 0946     Wound Culture No growth at 3 days     Gram Stain No WBCs or organisms seen    Blood Culture - Blood, Arm, Right [[id number]] Collected:  03/16/19 0334    Lab Status:  Preliminary result Specimen:  Blood from Arm, Right Updated:  03/20/19 0501     Blood Culture No growth at 4 days    Blood Culture - Blood, Arm, Left [[id number]] Collected:  03/16/19 0334    Lab Status:  Preliminary result Specimen:  Blood from Arm, Left Updated:  03/20/19 0501     Blood Culture No growth at 4 days    Influenza Antigen, Rapid - Swab, Nasopharynx [[id number]]  (Normal) Collected:  03/15/19 1949    Lab Status:  Final result Specimen:  Swab from Nasopharynx Updated:  03/15/19 2007     Influenza A Ag, EIA Negative     Influenza B Ag, EIA Negative            Discharge Vitals and Physical Examination       Vital Signs  Temp:  [97.6 °F (36.4 °C)-98 °F (36.7 °C)] 98 °F (36.7 °C)  Heart Rate:  [61-74] 66  Resp:  [17-18] 17  BP: ()/(53-81) 116/62     Physical Exam:  General:    Awake, alert, in no acute distress   Heart:      Normal S1 and S2. Regular rate and rhythm. No significant murmur, rubs or gallops appreciated.   Lungs:     Respirations regular, even and unlabored. Lungs clear to auscultation B/L. No wheezes, rales or rhonchi.    Abdomen:   Soft and nontender. No guarding, rebound tenderness or  organomegaly noted. Bowel sounds present x 4.   Extremities:  RLE heel with healing appearing ulcer. No clubbing, cyanosis or edema noted. Moves UE and LE equally B/L.         Discharge Disposition, Discharge Medications, and Discharge Appointments     Discharge Disposition:   home    Condition on Discharge:  Fair    Discharge Medications:     Discharge Medications      New Medications      Instructions Start Date   atorvastatin 40 MG tablet  Commonly known as:  LIPITOR  Replaces:  pravastatin 20 MG tablet   40 mg, Oral, Daily   Start Date:  3/21/2019     cefdinir 300 MG capsule  Commonly known as:  OMNICEF   300 mg, Oral, Every 12 Hours Scheduled      doxycycline 100 MG capsule  Commonly known as:  MONODOX   100 mg, Oral, Every 12 Hours Scheduled      furosemide 20 MG tablet  Commonly known as:  LASIX   20 mg, Oral, Daily PRN      hydrALAZINE 25 MG tablet  Commonly known as:  APRESOLINE   25 mg, Oral, Every 8 Hours Scheduled      metoprolol succinate XL 25 MG 24 hr tablet  Commonly known as:  TOPROL-XL   25 mg, Oral, Every 24 Hours Scheduled      multivitamin tablet tablet   1 tablet, Oral, Daily   Start Date:  3/21/2019     potassium chloride 10 MEQ CR tablet  Commonly known as:  K-DUR   10 mEq, Oral, Daily PRN         Changes to Medications      Instructions Start Date   insulin aspart 100 UNIT/ML injection  Commonly known as:  novoLOG  What changed:    · how much to take  · how to take this  · when to take this  · additional instructions   Sliding scale TID with meals. Glucose 150-199:2 units. 240-249: 3 units. 250-299- 4 units. 300-349- 5 units. 350-400 :6 units. Over 400: 7un         Continue These Medications      Instructions Start Date   CloNIDine 0.1 MG tablet  Commonly known as:  CATAPRES   0.1 mg, Oral, Every 6 Hours PRN      fludrocortisone 0.1 MG tablet   0.1 mg, Oral, Daily      FLUoxetine 20 MG capsule  Commonly known as:  PROzac    20 mg, Oral      gabapentin 800 MG tablet  Commonly known as:  NEURONTIN   800 mg, Oral, 3 Times Daily      HYDROcodone-acetaminophen  MG per tablet  Commonly known as:  NORCO   1 tablet, Oral, Every 4 Hours PRN, Pharmacy directions states every 4 to 6 hours prn pain.      hydrOXYzine 25 MG tablet  Commonly known as:  ATARAX   25 mg, Oral, Daily PRN      Insulin Glargine 100 UNIT/ML injection pen  Commonly known as:  BASAGLAR KWIKPEN   25 Units, Subcutaneous, 2 Times Daily      pantoprazole 40 MG EC tablet  Commonly known as:  PROTONIX   40 mg, Oral, Daily      rOPINIRole 0.5 MG tablet  Commonly known as:  REQUIP   0.5 mg, Oral, 2 Times Daily, Take 1 hour before bedtime.      temazepam 30 MG capsule  Commonly known as:  RESTORIL   30 mg, Oral, Nightly      tiZANidine 4 MG tablet  Commonly known as:  ZANAFLEX   4 mg, Oral, Every 6 Hours PRN      vitamin D 98607 units capsule capsule  Commonly known as:  ERGOCALCIFEROL   50,000 Units, Oral, Weekly, Prior to Religious Admission, Patient was on: takes on wednesdays         Stop These Medications    amLODIPine 10 MG tablet  Commonly known as:  NORVASC     atenolol 25 MG tablet  Commonly known as:  TENORMIN     escitalopram 10 MG tablet  Commonly known as:  LEXAPRO     pravastatin 20 MG tablet  Commonly known as:  PRAVACHOL  Replaced by:  atorvastatin 40 MG tablet     promethazine 25 MG tablet  Commonly known as:  PHENERGAN            Discharged medication regimen discussed with attending physician prior to discharge.     Discharge Diet:   diabetic diet  Dietary Orders (From admission, onward)    Start     Ordered    03/19/19 1614  Diet Regular; Consistent Carbohydrate  Diet Effective Now     Question Answer Comment   Diet Texture / Consistency Regular    Common Modifiers Consistent Carbohydrate        03/19/19 1615          Activity at Discharge:  activity as tolerated         Discharge Disposition:    Home or Self Care        Follow-up Appointments:  Your Scheduled  Appointments    Apr 22, 2019  3:15 PM EDT  Follow Up with DARIUS Rider  [facility] CARDIOLOGY (--) [address]  [facility] [address] [phone]   Arrive 15 minutes prior to appointment.    Additional instructions:      Pt  Has  An  Apt  With  Dr Paulson march 28  At  9  :20  Dr giles  For    April 3  At  3  Pm  In  [place]  Office  And  An  Apt  witj  Dr barragan  For  March 27  At  2:15                    Additional Instructions for the Follow-ups that You Need to Schedule     Discharge Follow-up with PCP   As directed       Currently Documented PCP:    Yandel Paulson MD    PCP Phone Number:    [phone]     Follow Up Details:  Follow up with Dr. Paulson in 1 week.         Discharge Follow-up with Specialty: Follow up with Dr. Helton in 4 weeks.   As directed      Specialty:  Follow up with Dr. Helton in 4 weeks.         Discharge Follow-up with Specified Provider: Follow up with Dr. Burks in 2 weeks.   As directed      To:  Follow up with Dr. Burks in 2 weeks.         Discharge Follow-up with Specified Provider: Follow up with Dr. Barragan in 1 week.   As directed      To:  Follow up with Dr. Barragan in 1 week.         Basic Metabolic Panel    Mar 25, 2019 (Approximate)      Please send results to Soha José.    Order Comments:  Please send results to Soha José.             Contact information for follow-up providers     Schedule an appointment as soon as possible for a visit  with Yandel Paulson MD.    Specialty:  Family Medicine  Why:  If symptoms worsen  Contact information:  [address] [facility] [address]  [phone]             Schedule an appointment as soon as possible for a visit  with Yandel Paulson MD.    Specialty:  Family Medicine  Why:  If symptoms worsen  Contact information:  [address] [facility] [address]  [phone]             Yandel Paulson MD .    Specialty:  Family Medicine  Why:  Follow up with Dr. Paulson in 1  week.  Contact information:  [address] [address]  [phone]                   Contact information for after-discharge care     Home Medical Care     [facility] .    Service:  Home Health Services  Contact information:  [address]  [place] [address] [phone]                             Additional Instructions for the Follow-ups that You Need to Schedule     Discharge Follow-up with PCP   As directed       Currently Documented PCP:    Yandel Paulson MD    PCP Phone Number:    [phone]     Follow Up Details:  Follow up with Dr. Paulson in 1 week.         Discharge Follow-up with Specialty: Follow up with Dr. Helton in 4 weeks.   As directed      Specialty:  Follow up with Dr. Helton in 4 weeks.         Discharge Follow-up with Specified Provider: Follow up with Dr. Burks in 2 weeks.   As directed      To:  Follow up with Dr. Burks in 2 weeks.         Discharge Follow-up with Specified Provider: Follow up with Dr. Barragan in 1 week.   As directed      To:  Follow up with Dr. Barragan in 1 week.         Basic Metabolic Panel    Mar 25, 2019 (Approximate)      Please send results to Soha José.    Order Comments:  Please send results to Soha Paulson and Kimmie.                Test Results Pending at Discharge:     Order Current Status    Blood Culture - Blood, Arm, Left Preliminary result    Blood Culture - Blood, Arm, Right Preliminary result             Ashely Acevedo PA-C  [facility] Medicine Team  03/20/19  4:24 PM      Time: Greater than 30 minutes spent on this discharge.

## 2019-03-20 NOTE — PROGRESS NOTES
Nephrology Progress Note      Subjective     Patient is feeling much better patient denies any nausea vomiting or diarrhea    Objective       Vitals signs :     Temp:  [97.5 °F (36.4 °C)-98 °F (36.7 °C)] 98 °F (36.7 °C)  Heart Rate:  [61-84] 61  Resp:  [18] 18  BP: ()/() 90/53    I/O last 3 completed shifts:  In: 1200 [P.O.:1200]  Out: 3990 [Urine:3990]  I/O this shift:  In: -   Out: 600 [Urine:600]    Physical Exam:    General Appearance : He is alert answering most of the question  Head  :  normocephalic, without obvious abnormality and atraumatic  Eyes  :  no icterus, no pallor and PERRLA  Neck  :  no adenopathy, suppple, no carotid bruit, no JVD   Lungs : clear to auscultation, respirations regular and unlabored  Heart :  regular rhythm & normal rate, normal S1, S2 and no murmur, no nahid, no rub  Abdomen : normal bowel sounds, no masses,  soft non-tender  Extremities : moves extremities well, TRACE  edema, no cyanosis and no redness  Skin :  no bleeding, bruising or rash  Neurologic : Mildly confused      Laboratory Data :         WBC WBC   Date Value Ref Range Status   03/20/2019 4.53 3.40 - 10.80 10*3/mm3 Final   03/19/2019 4.52 3.40 - 10.80 10*3/mm3 Final   03/18/2019 5.65 3.40 - 10.80 10*3/mm3 Final      HGB Hemoglobin   Date Value Ref Range Status   03/20/2019 10.9 (L) 12.0 - 15.9 g/dL Final   03/19/2019 11.6 (L) 12.0 - 15.9 g/dL Final   03/18/2019 10.0 (L) 12.0 - 15.9 g/dL Final   03/17/2019 10.4 (L) 12.0 - 15.9 g/dL Final      HCT Hematocrit   Date Value Ref Range Status   03/20/2019 33.5 (L) 34.0 - 46.6 % Final   03/19/2019 35.6 34.0 - 46.6 % Final   03/18/2019 31.1 (L) 34.0 - 46.6 % Final   03/17/2019 32.2 (L) 34.0 - 46.6 % Final      Platlets No results found for: LABPLAT   MCV MCV   Date Value Ref Range Status   03/20/2019 88.9 79.0 - 97.0 fL Final   03/19/2019 89.9 79.0 - 97.0 fL Final   03/18/2019 90.1 79.0 - 97.0 fL Final          Sodium Sodium   Date Value Ref Range Status    03/20/2019 141 136 - 145 mmol/L Final   03/19/2019 138 136 - 145 mmol/L Final   03/18/2019 138 136 - 145 mmol/L Final      Potassium Potassium   Date Value Ref Range Status   03/20/2019 3.3 (L) 3.5 - 5.2 mmol/L Final   03/19/2019 4.9 3.5 - 5.2 mmol/L Final   03/18/2019 4.0 3.5 - 5.2 mmol/L Final      Chloride Chloride   Date Value Ref Range Status   03/20/2019 106 98 - 107 mmol/L Final   03/19/2019 109 (H) 98 - 107 mmol/L Final   03/18/2019 110 (H) 98 - 107 mmol/L Final      CO2 CO2   Date Value Ref Range Status   03/20/2019 23.4 22.0 - 29.0 mmol/L Final   03/19/2019 19.8 (L) 22.0 - 29.0 mmol/L Final   03/18/2019 21.1 (L) 22.0 - 29.0 mmol/L Final      BUN BUN   Date Value Ref Range Status   03/20/2019 19 8 - 23 mg/dL Final   03/19/2019 19 8 - 23 mg/dL Final   03/18/2019 28 (H) 8 - 23 mg/dL Final      Creatinine Creatinine   Date Value Ref Range Status   03/20/2019 1.03 (H) 0.57 - 1.00 mg/dL Final   03/19/2019 1.34 (H) 0.57 - 1.00 mg/dL Final   03/18/2019 1.34 (H) 0.57 - 1.00 mg/dL Final      Calcium Calcium   Date Value Ref Range Status   03/20/2019 8.3 (L) 8.6 - 10.5 mg/dL Final   03/19/2019 8.3 (L) 8.6 - 10.5 mg/dL Final   03/18/2019 7.8 (L) 8.6 - 10.5 mg/dL Final      PO4 No results found for: CAPO4   Albumin Albumin   Date Value Ref Range Status   03/20/2019 2.69 (L) 3.50 - 5.20 g/dL Final   03/19/2019 2.91 (L) 3.50 - 5.20 g/dL Final   03/18/2019 2.80 (L) 3.50 - 5.20 g/dL Final      Magnesium Magnesium   Date Value Ref Range Status   03/20/2019 1.9 1.6 - 2.4 mg/dL Final   03/19/2019 2.0 1.6 - 2.4 mg/dL Final   03/18/2019 2.3 1.6 - 2.4 mg/dL Final      Uric Acid No results found for: URICACID       Medications :       amLODIPine 10 mg Oral Q24H   atorvastatin 40 mg Oral Daily   cefdinir 300 mg Oral Q12H   cholecalciferol 50,000 Units Oral Weekly   clopidogrel 75 mg Oral Daily   doxycycline 100 mg Oral Q12H   fludrocortisone 0.1 mg Oral Daily   FLUoxetine 20 mg Oral Daily   gabapentin 600 mg Oral BID   insulin  aspart 0-7 Units Subcutaneous 4x Daily AC & at Bedtime   insulin detemir 15 Units Subcutaneous Q12H   metoprolol tartrate 25 mg Oral Q12H   multivitamin 1 tablet Oral Daily   pantoprazole 40 mg Intravenous Q12H   rOPINIRole 0.5 mg Oral BID   sodium chloride 3 mL Intravenous Q12H   temazepam 30 mg Oral Nightly       Pharmacy Consult - Pharmacy to dose        Radiology Results :     Imaging Results (last 72 hours)     Procedure Component Value Units Date/Time    CT Lower Extremity Right Without Contrast [[id number]] Collected:  03/17/19 0905     Updated:  03/17/19 0909    Narrative:       EXAMINATION: CT LOWER EXTREMITY RIGHT WO CONTRAST-      CLINICAL INDICATION: Pain      TECHNIQUE: Multiple axial CT images were obtained through the right  lower extremity without IV contrast administration.  The axial CT data  set was used to generate high resolution reformatted images in the  coronal and sagittal planes to facilitate diagnostic accuracy and  treatment planning.      Radiation dose reduction techniques were utilized per ALARA protocol.  Automated exposure control was initiated through either or [employer] or  DoseRight software packages by  protocol.       [phone] mGy.cm     COMPARISON: None      FINDINGS: Patient has had a prior tibia and hindfoot hardware fixation.  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 Metallic streak artifact limits evaluation.  Fracture lines are still present. Some soft tissue swelling may  represent cellulitis or edema.       Impression:       Patient has had a prior tibia and hindfoot hardware  fixation. 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 Metallic streak artifact limits  evaluation. Fracture lines are still present. Some soft tissue swelling  may represent cellulitis or edema.     This report was finalized on 3/17/2019 9:07 AM by Dr. Harrison Biswas MD.         Renal Limited [[id number]] Collected:  03/17/19 0905     Updated:  03/17/19 0907    Narrative:       EXAMINATION: US RENAL LIMITED-      CLINICAL INDICATION:     worsening kidney function; R73.9-Hyperglycemia,  unspecified; R31.29-Other microscopic hematuria     TECHNIQUE: Multiplanar gray scale sonographic imaging of the kidneys.      COMPARISON: NONE      FINDINGS: Both kidneys are normal in size and echogenicity. There is no  renal mass, stone, or hydronephrosis seen in either kidney. The right  kidney measures    [phone] cm in length; the left kidney  measures     9.45834866851 cm in length.        Impression:       Unremarkable bilateral renal ultrasound.      This report was finalized on 3/17/2019 9:05 AM by Dr. Harrison Biswas MD.       XR Chest 1 View [[id number]] Collected:  03/16/19 0851     Updated:  03/16/19 0854    Narrative:       EXAMINATION: XR CHEST 1 VW-      CLINICAL INDICATION:     fever     TECHNIQUE:  XR CHEST 1 VW-      COMPARISON: NONE      FINDINGS: Left PICC with tip in SVC.  The lungs remain aerated.  Heart and mediastinum contours are unremarkable.  No pleural effusion.  No pneumothorax.   Bony and soft tissue structures are unremarkable.       Impression:       No radiographic evidence of acute cardiac or pulmonary  disease.     This report was finalized on 3/16/2019 8:52 AM by Dr. Harrison Biswas MD.       CT Head Without Contrast [[id number]] Collected:  03/16/19 0851     Updated:  03/16/19 0854    Narrative:          EXAMINATION: CT HEAD WO CONTRAST-      CLINICAL INDICATION:     Confusion/delirium, altered LOC, unexplained;  R73.9-Hyperglycemia, unspecified; R31.29-Other microscopic hematuria     TECHNIQUE: Contiguous axial CT images of the head were obtained without  contrast administration.      Radiation dose reduction techniques were utilized per ALARA protocol.  Automated exposure control was initiated through either or [employer] or  DoseRight software packages by   protocol.       [id number] mGy.cm     COMPARISON:  None.       FINDINGS: Generalized cerebral atrophy is present. There is no mass  effect, midline displacement, or hydrocephalus. There are patchy areas  of decreased density within the periventricular white matter which  likely reflect chronic small vessel ischemic changes. There is no CT  evidence of acute infarct or hemorrhage. Bone windows reveal no osseous  abnormalities or fractures.        Impression:       Atrophy and chronic small vessel ischemic change, but there  are no acute intracranial abnormalities identified.         This report was finalized on 3/16/2019 8:51 AM by Dr. Harrison Biswas MD.               Assessment/Plan       1.  Acute renal failure on chronic kidney disease stage III: Patient baseline creatinine is around 1.1- went up to 1.7 now 1.3 patient has proteinuria of 4 g .  Patient is getting 24-hour urine protein and will follow-up as an outpatient on that    2.  Hyperglycemia:  Getting better     3.  Confusion: Patient had confusion at admission but now feeling better     4.  Fever:  Better , patient is currently continued on cefepime and vancomycin    5.  Systolic congestive heart failure: EF is 45% with diastolic congestive heart failure also will use Lasix as needed    Will sign off please call if needed we will schedule the patient to see us as outpatient in 2 weeks         Discussed with patient      S Crispin Burks MD  03/20/19  6:32 AM

## 2019-03-20 NOTE — DISCHARGE INSTR - APPOINTMENTS
Pt  Has  An  Apt  With  Dr Paulson march 28  At  9  :20  Dr giles  For    April 3  At  3  Pm  In  [facility]  And  An  Apt  [facility]  Dr alamo  For  March 27  At  2:15

## 2019-03-20 NOTE — PROGRESS NOTES
LOS: 3 days     Name: Reny Elena  Age/Sex: 59 y.o. female  :  1960        PCP: Yandel Paulson MD    Active Problems:    Hyperglycemia    Confusion      Admission Information: Reny Elena is a 59 y.o. female with past medical history significant for hypertension, dyslipidemia, diabetes mellitus type 2 insulin dependent, chronic kidney disease, peptic ulcer disease and right tib/fib fracture status post repair. She was admitted to [facility] on 3/15/19 with complaints of nausea, vomiting and fever. Initial evaluation in the ED revealed a fever of 103.3 F, hypotension and hypoxemia. Chest x-ray was unremarkable. White blood cell count was 13.5 and creatinine was 1.22. She was diagnosed with sepsis and was admitted for further evaluation. During admission she developed acute kidney injury, creatinine 1.7, nephrology as consulted.  Troponin on 3/17/19 was 0.105 and peaked at 0.146. It has trended down to 0.118. EKG revealed changes concerning for ischemia, hence cardiology was consulted. Upon evaluation today, patient denies chest pain and shortness of breath. She reports the fever, abdominal pain and vomiting has resolved.Overall she reports felling better. She is not a smoker. She has a history of peptic ulcers that was diagnosed in 2017. Her last echocardiogram on 18 revealed an EF of 56-60% with mild mitral and tricuspid regurgitation and mild aortic stenosis. Echocardiogram has been ordered by the primary team and stools for occult blood are pending.          Following for: possible non-ST elevation MI    Telemetry Monitoring: sinus rhythm 60s to 70s    Interval history: She is resting in bed this morning. States she is feeling much better today. She denies chest pain, shortness of breath, or palpitations. Stress test revealed no evidence of ischemia.    Subjective     ROS    Vital Signs  Vitals:    19 1834 19 0035 19 0310 19 0633   BP: 111/62 161/81  90/53 100/55   BP Location: Right arm Left arm Left arm Left arm   Patient Position: Lying Lying Lying Lying   Pulse: 73 73 61 63   Resp: 18 18 18 18   Temp: 98 °F (36.7 °C) 97.6 °F (36.4 °C) 98 °F (36.7 °C) 97.9 °F (36.6 °C)   TempSrc: Oral Oral Oral Oral   SpO2: 97% 97% 93% 98%   Weight:   59 kg (130 lb 1.6 oz)    Height:         Body mass index is 21.65 kg/m².      Intake/Output Summary (Last 24 hours) at 3/20/2019 0822  Last data filed at 3/19/2019 2300  Gross per 24 hour   Intake 600 ml   Output 2390 ml   Net -1790 ml       Physical Exam   Constitutional: She is oriented to person, place, and time. She appears well-developed and well-nourished.   HENT:   Head: Normocephalic and atraumatic.   Neck: No JVD present.   Cardiovascular: Normal rate, regular rhythm and normal heart sounds. Exam reveals no S3 and no S4.   No murmur heard.  Pulmonary/Chest: Effort normal and breath sounds normal. She has no wheezes. She has no rales.   Abdominal: Soft. Bowel sounds are normal.   Musculoskeletal: She exhibits edema (mild edema RLE).   Neurological: She is alert and oriented to person, place, and time.   Skin: Skin is warm and dry.   Surgical scarring noted RLE     Psychiatric: She has a normal mood and affect. Her behavior is normal.              Results Review:     Results from last 7 days   Lab Units 03/20/19  0109 03/19/19 0333 03/18/19 0055 03/17/19 1954 03/17/19  0416 03/16/19  0334 03/16/19  0053 03/15/19  1928   WBC 10*3/mm3 4.53 4.52 5.65  --  8.84 11.84* 11.21* 13.50*   HEMOGLOBIN g/dL 10.9* 11.6* 10.0* 10.4* 11.0* 13.9 13.8 13.9   PLATELETS 10*3/mm3 128* 116* 131*  --  140 147 147 205     Results from last 7 days   Lab Units 03/20/19 0109 03/19/19  0333 03/18/19  0055 03/17/19  0416 03/16/19  0053 03/15/19  1928   SODIUM mmol/L 141 138 138 139 135* 135*   POTASSIUM mmol/L 3.3* 4.9 4.0 3.8 3.8 4.1   CHLORIDE mmol/L 106 109* 110* 109* 98 93*   CO2 mmol/L 23.4 19.8* 21.1* 21.2* 18.1* 23.7   BUN mg/dL 19 19 28*  31* 19 19   CREATININE mg/dL 1.03* 1.34* 1.34* 1.70* 1.14* 1.22*   CALCIUM mg/dL 8.3* 8.3* 7.8* 7.9* 8.7 9.5   GLUCOSE mg/dL 208* 248* 231* 61* 301* 494*     Results from last 7 days   Lab Units 19  1249 19  0751 19  0055 19   TROPONIN T ng/mL 0.118* 0.146* 0.125* 0.105*     Reny Elena   Echocardiogram   Order# [id number]   Reading physician:   Crescencio Helton MD Ordering physician:   Ashely Tatum PA Study date: 3/18/19   Patient Information     Patient Name  Reny Elena MRN  [id number] Sex  Female  (Age)  1960 (59 y.o.)   Admission Information     Admission Date/Time Discharge Date/Time Room/Bed   03/15/19  1901  3315/2S   Sedation Narrator Report     Interpretation Summary     · Normal left ventricular cavity size and wall thickness noted.  · Left ventricular diastolic dysfunction (grade I) consistent with impaired relaxation.  · Left ventricular systolic function is mildly decreased.  · Estimated EF appears to be in the range of 46 - 50%.  · The aortic valve is abnormal in structure. The valve exhibits sclerosis. Mild aortic valve regurgitation is present. No aortic valve stenosis is present.  · The mitral valve is normal in structure. Mild mitral valve regurgitation is present. No significant mitral valve stenosis is present.  · The tricuspid valve is normal. No evidence of tricuspid valve stenosis is present. No tricuspid valve regurgitation is present.  · There is no evidence of pericardial effusion.  · Comments: LV systolic function has decreased since 18(LVEF decreased from 56-60% then to 46-50% now)          I reviewed the patient's new clinical results.  I reviewed the patient's new imaging results and agree with the interpretation.  I personally viewed and interpreted the patient's EKG/Telemetry data      Medication Review:     amLODIPine 10 mg Oral Q24H   atorvastatin 40 mg Oral Daily   cefdinir 300 mg Oral Q12H   cholecalciferol 50,000 Units  Oral Weekly   clopidogrel 75 mg Oral Daily   doxycycline 100 mg Oral Q12H   fludrocortisone 0.1 mg Oral Daily   FLUoxetine 20 mg Oral Daily   gabapentin 600 mg Oral BID   insulin aspart 0-7 Units Subcutaneous 4x Daily AC & at Bedtime   insulin detemir 15 Units Subcutaneous Q12H   metoprolol tartrate 25 mg Oral Q12H   multivitamin 1 tablet Oral Daily   pantoprazole 40 mg Intravenous Q12H   rOPINIRole 0.5 mg Oral BID   sodium chloride 3 mL Intravenous Q12H   temazepam 30 mg Oral Nightly       Pharmacy Consult - Pharmacy to dose        Assessment:  1. Elevated Troponin, Peaked at 0.146, trended down to 0.118. Suspected acute non-ST elevation MI.  2. Abnormal EKG, T wave inversion concerning for anterior wall ischemia.  3. Essential Hypertension, controlled  4. Acute kidney injury, On Stage III CKD, Creatinine 1.03 today, Nephrology managing  5. Sepsis, Managed by the primary team  6. Anemia with a drop in H/H from 13.9 to 10.0 since 3/16/19, stool for occult blood pending, Hemoglobin 10.9 today  7. Acute hypoxic respiratory failure,resolved.  8. Prolonged QTc, 489 ms.  9. Diabetes Mellitus, type 2  10. Dyslipidemia, on statin therapy  11. Mild cardiomyopathy with EF 46-50%      Recommendations:  1. Given her cardiomyopathy, will add hydralazine 25 mg p.o. 3 times daily and Toprol-XL 25 mg daily and discontinue the amlodipine and metoprolol tartrate.  2. She is okay for discharge home from cardiac standpoint and follow-up in our office in 1-2 weeks.    I discussed the patients findings and my recommendations with patient and Dr. Hollins.      DARIUS iRder, acting as scribe for Dr. Crescencio Helton MD, FACC.  03/20/19  8:22 AM    I, Crescencio Helton MD, FACC, personally performed the services described in this documentation as scribed by the above named individual in my presence, made necessary changes and the note is both accurate and complete.     Crescencio Helton MD, FACC  03/20/19  8:22 AM

## 2019-03-20 NOTE — NURSING NOTE
Pt to be discharged after Mag and potassium replacements have been given. Discharge will take approximately four hours because of these protocols.

## 2019-03-20 NOTE — PLAN OF CARE
Problem: Fall Risk (Adult)  Goal: Identify Related Risk Factors and Signs and Symptoms  Outcome: Ongoing (interventions implemented as appropriate)    Goal: Absence of Fall  Outcome: Ongoing (interventions implemented as appropriate)      Problem: Patient Care Overview  Goal: Plan of Care Review  Outcome: Ongoing (interventions implemented as appropriate)    Goal: Individualization and Mutuality  Outcome: Ongoing (interventions implemented as appropriate)    Goal: Discharge Needs Assessment  Outcome: Ongoing (interventions implemented as appropriate)    Goal: Interprofessional Rounds/Family Conf  Outcome: Ongoing (interventions implemented as appropriate)      Problem: Skin Injury Risk (Adult)  Goal: Identify Related Risk Factors and Signs and Symptoms  Outcome: Ongoing (interventions implemented as appropriate)    Goal: Skin Health and Integrity  Outcome: Ongoing (interventions implemented as appropriate)      Problem: Diabetes, Type 2 (Adult)  Goal: Signs and Symptoms of Listed Potential Problems Will be Absent, Minimized or Managed (Diabetes, Type 2)  Outcome: Ongoing (interventions implemented as appropriate)      Problem: Cardiac: ACS (Acute Coronary Syndrome) (Adult)  Goal: Signs and Symptoms of Listed Potential Problems Will be Absent, Minimized or Managed (Cardiac: ACS)  Outcome: Ongoing (interventions implemented as appropriate)

## 2019-03-20 NOTE — PROGRESS NOTES
Discharge Planning Assessment  EDWARD Veliz     Patient Name: Reny Elena  MRN: [id number]  Today's Date: 3/20/2019    Admit Date: 3/15/2019      Discharge Plan     Row Name 03/20/19 1159       Plan    Final Discharge Disposition Code  06 - home with home health care    Final Note  Pt to be discharged home with continued home health per [facility].   notified [facility] per [place] at [phone] and faxed pt information to [phone],            Gisela Sandoval

## 2019-03-20 NOTE — DISCHARGE PLACEMENT REQUEST
"Reny Merino (59 y.o. Female)     Date of Birth Social Security Number Address Home Phone MRN    1960  [address] [place] [address] [phone] [id number]    Orthodoxy Marital Status          Restorationist        Admission Date Admission Type Admitting Provider Attending Provider Department, Room/Bed    3/15/19 Emergency Ricci Rubio MD Troxell, Christopher A, DO [address], [address]    Discharge Date Discharge Disposition Discharge Destination         Home or Self Care              Attending Provider:  Mark Crawford DO    Allergies:  Latex, Morphine And Related, Penicillins, Codeine, Sulfa Antibiotics    Isolation:  None   Infection:  None   Code Status:  CPR    Ht:  165.1 cm (65\")   Wt:  59 kg (130 lb 1.6 oz)    Admission Cmt:  None   Principal Problem:  None                Active Insurance as of 3/15/2019     Primary Coverage     Payor Plan Insurance Group Employer/Plan Group    [employer] [place] [employer] [place]      Payor Plan Address Payor Plan Phone Number Payor Plan Fax Number Effective Dates    [address] [address] [phone]  4/12/2017 - None Entered    [facility] [id number]       Subscriber Name Subscriber Birth Date Member ID       RENY MERINO 1960 [id number]                 Emergency Contacts      (Rel.) Home Phone Work Phone Mobile Phone    Liza Oliva (Daughter) [phone] -- --    Cliff Leiva (Significant Other) [phone] -- --            Emergency Contact Information     Name Relation Home Work Mobile    Liza Oilva Daughter [phone]      Cliff Leiva Significant Other [phone]            Insurance Information                [facility] MEDICAID Phone: [phone]    Subscriber: Reny Merino Subscriber#: [id number]    Group#:  Precert#:           Treatment Team     Provider Relationship Specialty Contact    Mark Crawford DO Attending, Physician of Record Hospitalist [phone]    " Sherrell Chavez, MARIA C Physical Therapist Physical Therapy     Santiago Burks MD Consulting Physician Nephrology [phone]    Yaneli Gallegos, RRT Respiratory Therapist --     Crescencio Helton MD Consulting Physician Cardiology [phone]    Funmilayo Nevarez RN Registered Nurse -- [phone]    Bruna Phelan Patient Care Technician --           Problem List           Codes Noted - Resolved       Hospital    Cellulitis ICD-10-CM: L03.90  ICD-9-CM: 682.9 3/20/2019 - Present    Metabolic encephalopathy ICD-10-CM: G93.41  ICD-9-CM: 348.31 3/20/2019 - Present    Confusion ICD-10-CM: R41.0  ICD-9-CM: 298.9 3/16/2019 - Present    Hyperglycemia ICD-10-CM: R73.9  ICD-9-CM: 790.29 3/15/2019 - Present    Severe sepsis (CMS/HCC) ICD-10-CM: A41.9, R65.20  ICD-9-CM: 038.9, 995.92 2018 - Present       Non-Hospital    Wound of right ankle ICD-10-CM: S91.001A  ICD-9-CM: 891.0 2019 - Present    Iron deficiency anemia ICD-10-CM: D50.9  ICD-9-CM: 280.9 2018 - Present    Type 2 diabetes mellitus with hyperglycemia, with long-term current use of insulin (CMS/HCC) ICD-10-CM: E11.65, Z79.4  ICD-9-CM: 250.00, 790.29, V58.67 2018 - Present             History & Physical      Shania Justin APRN at 3/15/2019 11:30 PM              HISTORY AND PHYSICAL    Patient Identification:  Name:  Reny Elena  Age:  59 y.o.  Sex:  female  :  1960  MRN:  [id number]   Visit Number:  [id number]  Primary Care Physician:  Yandel Paulson MD       Subjective     Subjective     Chief complaint:     Chief Complaint   Patient presents with   • Vomiting   • Nausea   • Fever       History of presenting illness:     According to ER report, patient was brought into the ER for nausea, vomiting and fever.  Patient was admitted to the observation unit for further evaluation monitoring.  Upon my evaluation, patient was very confused and was unable to answer any questions.    Blood glucose in the ED  is 494 which was treated, hyponatremia at 135, lipase 11, magnesium 1.9, white blood count 13.50, influenza negative, UA not concerning for UTI, urine tox screen negative, chest x-ray pending.  ---------------------------------------------------------------------------------------------------------------------  Review of Systems   Unable to perform ROS: Mental status change     ---------------------------------------------------------------------------------------------------------------------     Past Medical History    Past Medical History:   Diagnosis Date   • Arthritis    • Depression    • Essential hypertension    • Hyperlipidemia    • PAD (peripheral artery disease) (CMS/[facility])    • Type 2 diabetes mellitus (CMS/[facility])        Past Surgical History    Past Surgical History:   Procedure Laterality Date   • BACK SURGERY      Post spinal diskectomy, osteophytectomy in one lumbar interspace   • CATARACT EXTRACTION      Left    •  SECTION     • DENTAL PROCEDURE     • HYSTERECTOMY     • INCISION AND DRAINAGE LEG Left 4/15/2017    Procedure: INCISION AND DRAINAGE LOWER EXTREMITY;  Surgeon: Basilio Morris MD;  Location: [facility] OR;  Service:    • INCISION AND DRAINAGE LEG Left 2017    Procedure: Irrigation and Debridment abcess diabetic wound left foot with deep culture;  Surgeon: Basilio Morris MD;  Location: [facility] OR;  Service:    • KNEE ARTHROSCOPY Right    • ORIF TIBIA FRACTURE Right 2018    Procedure: TIBIAL  FRACTURE OPEN REDUCTION INTERNAL FIXATION;  Surgeon: Jung Barragan MD;  Location: [facility] OR;  Service: Orthopedics   • TOE AMPUTATION Right     5th   • TUBAL ABDOMINAL LIGATION         Family History    Family History   Problem Relation Age of Onset   • Heart disease Mother    • Cancer Mother    • COPD Mother    • Diabetes Other    • Depression Other        Social History    Social History     Tobacco Use   • Smoking status: Never Smoker   Substance Use Topics   • Alcohol use: No   • Drug  use: No       Allergies    Latex; Morphine and related; Penicillins; Codeine; and Sulfa antibiotics  ---------------------------------------------------------------------------------------------------------------------     Home Medications:    Prior to Admission Medications     Prescriptions Last Dose Informant Patient Reported? Taking?    gabapentin (NEURONTIN) 800 MG tablet  CECI Yes Yes    Take 800 mg by mouth 3 (Three) Times a Day.    amLODIPine (NORVASC) 10 MG tablet  Self No No    Take 1 tablet by mouth Daily.    atenolol (TENORMIN) 25 MG tablet  Self Yes No    Take 25 mg by mouth 2 (Two) Times a Day.    escitalopram (LEXAPRO) 10 MG tablet  Self Yes No    Take 10 mg by mouth Every Night.    fludrocortisone 0.1 MG tablet  Self Yes No    Take 0.1 mg by mouth Daily.    FLUoxetine (PROzac) 20 MG capsule   Yes No    Take 20 mg by mouth.    HYDROcodone-acetaminophen (NORCO) 7.5-325 MG per tablet   No No    Take 1 tablet by mouth Every 4 (Four) Hours As Needed for Moderate Pain .    hydrOXYzine (ATARAX) 25 MG tablet  Self Yes No    Take 25 mg by mouth 3 (Three) Times a Day As Needed (coughing).    insulin aspart (novoLOG) 100 UNIT/ML injection   No No    Sliding scale TID with meals. Glucose 150-199:2 units. 240-249: 3 units. 250-299- 4 units. 300-349- 5 units. 350-400 :6 units. Over 400: 7un    Insulin Glargine (BASAGLAR KWIKPEN) 100 UNIT/ML injection pen  Self No No    Inject 25 Units under the skin into the appropriate area as directed 2 (Two) Times a Day.    iron polysaccharides (NIFEREX) 150 MG capsule   No No    Take 1 capsule by mouth Daily.    pantoprazole (PROTONIX) 40 MG EC tablet  Self Yes No    Take 40 mg by mouth Daily.    sennosides-docusate sodium (SENOKOT-S) 8.6-50 MG tablet  Self No No    Take 1 tablet by mouth 2 (Two) Times a Day.    Patient taking differently:  Take 1 tablet by mouth 2 (Two) Times a Day As Needed.    temazepam (RESTORIL) 30 MG capsule  Self Yes No    Take 30 mg by mouth Every  Night.    tiZANidine (ZANAFLEX) 4 MG tablet  Self Yes No    Take 4 mg by mouth Every 6 (Six) Hours As Needed for Muscle Spasms.    vitamin D (ERGOCALCIFEROL) 63517 units capsule capsule  Self Yes No    Take 50,000 Units by mouth 1 (One) Time Per Week. Prior to [facility] Admission, Patient was on: takes on wednesdays        ---------------------------------------------------------------------------------------------------------------------    Objective     Objective     Hospital Scheduled Meds:    aztreonam 2 g Intravenous Q8H   enoxaparin 40 mg Subcutaneous Q24H   insulin aspart 0-7 Units Subcutaneous 4x Daily AC & at Bedtime   sodium chloride 3 mL Intravenous Q12H   [START ON 3/17/2019] vancomycin 15 mg/kg Intravenous Q24H       Pharmacy to dose vancomycin      ---------------------------------------------------------------------------------------------------------------------   Vital Signs:  Temp:  [99 °F (37.2 °C)-103.3 °F (39.6 °C)] 99.8 °F (37.7 °C)  Heart Rate:  [] 96  Resp:  [17-20] 20  BP: (136-173)/() 136/78  Mean Arterial Pressure (Non-Invasive) for the past 24 hrs (Last 3 readings):   Noninvasive MAP (mmHg)   03/16/19 0502 97   03/16/19 0100 105   03/15/19 2337 108     SpO2 Percentage    03/15/19 2048 03/15/19 2053 03/16/19 0502   SpO2: (!) 80% 96% 98%     SpO2:  [80 %-98 %] 98 %  on  Flow (L/min):  [2] 2;   Device (Oxygen Therapy): nasal cannula    Body mass index is 18.33 kg/m².  Wt Readings from Last 3 Encounters:   03/15/19 53.1 kg (117 lb 1 oz)   02/14/19 56.7 kg (125 lb)   12/30/18 73.3 kg (161 lb 9.6 oz)     ---------------------------------------------------------------------------------------------------------------------     Physical Exam:    Constitutional:  Well-developed and well-nourished.  No respiratory distress.      HENT:  Head: Normocephalic and atraumatic.  Mouth:  Moist mucous membranes.    Eyes:  Conjunctivae and EOM are normal.  No scleral icterus.  Neck:  Neck supple.  No  JVD present.    Cardiovascular:  Normal rate, regular rhythm and normal heart sounds with no murmur. No edema.  Pulmonary/Chest:  No respiratory distress, no wheezes, no crackles, with normal breath sounds and good air movement.  Abdominal:  Soft.  Bowel sounds are normal.  No distension and no tenderness.   Musculoskeletal:  No edema, no tenderness, and no deformity.  No swelling or redness of joints.  Neurological: Confused  Skin:  Skin is warm and dry.  No rash noted.  No pallor.   Psychiatric:  Normal mood and affect.  Behavior is normal.    ---------------------------------------------------------------------------------------------------------------------  I have personally reviewed the EKG/Telemetry strip  ---------------------------------------------------------------------------------------------------------------------           Results from last 7 days   Lab Units 03/16/19  0018   PH, ARTERIAL pH units 7.419   PO2 ART mm Hg 53.7*   PCO2, ARTERIAL mm Hg 35.6   HCO3 ART mmol/L 22.5     Results from last 7 days   Lab Units 03/16/19  0334 03/16/19  0053 03/15/19  1928   LACTATE mmol/L  --  2.6*  --    WBC 10*3/mm3 11.84* 11.21* 13.50*   HEMOGLOBIN g/dL 13.9 13.8 13.9   HEMATOCRIT % 41.9 40.5 39.8   MCV fL 87.5 86.7 85.0   MCHC g/dL 33.2 34.1 34.9   PLATELETS 10*3/mm3 147 147 205     Results from last 7 days   Lab Units 03/16/19  0053 03/15/19  1928   SODIUM mmol/L 135* 135*   POTASSIUM mmol/L 3.8 4.1   MAGNESIUM mg/dL 1.8 1.9   CHLORIDE mmol/L 98 93*   CO2 mmol/L 18.1* 23.7   BUN mg/dL 19 19   CREATININE mg/dL 1.14* 1.22*   EGFR IF NONAFRICN AM mL/min/1.73 48* 45*   CALCIUM mg/dL 8.7 9.5   GLUCOSE mg/dL 301* 494*   ALBUMIN g/dL  --  4.18   BILIRUBIN mg/dL  --  0.6   ALK PHOS U/L  --  159*   AST (SGOT) U/L  --  18   ALT (SGPT) U/L  --  10   Estimated Creatinine Clearance: 43.4 mL/min (A) (by C-G formula based on SCr of 1.14 mg/dL (H)).  Ammonia   Date Value Ref Range Status   03/16/2019 30 11 - 51 umol/L Final        Glucose   Date/Time Value Ref Range Status   03/15/2019 2334 286 (H) 70 - 130 mg/dL Final   03/15/2019 2229 326 (H) 70 - 130 mg/dL Final     Lab Results   Component Value Date    HGBA1C 9.40 (H) 12/29/2018     Lab Results   Component Value Date    TSH 1.413 12/29/2018    FREET4 1.10 12/29/2018                      Pain Management Panel     Pain Management Panel Latest Ref Rng & Units 3/15/2019 11/21/2018    CREATININE UR mg/dL - -    AMPHETAMINES SCREEN, URINE Negative Negative Negative    BARBITURATES SCREEN Negative Negative Negative    BENZODIAZEPINE SCREEN, URINE Negative Negative Positive(A)    BUPRENORPHINE Negative Negative Negative    COCAINE SCREEN, URINE Negative Negative Negative    METHADONE SCREEN, URINE Negative Negative Negative        I have personally reviewed the above laboratory results.   ---------------------------------------------------------------------------------------------------------------------  Imaging Results (last 7 days)     Procedure Component Value Units Date/Time    CT Head Without Contrast [[id number]] Updated:  03/16/19 0125    XR Chest 1 View [[id number]] Updated:  03/15/19 1928        I have personally reviewed the above radiology results.   ---------------------------------------------------------------------------------------------------------------------      Assessment & Plan        Assessment/Plan       ASSESSMENT:    1.  Hyperglycemia  2.  Confusion    PLAN:    According to ER report, patient was brought into the ER for nausea, vomiting and fever.  Patient was admitted to the observation unit for further evaluation monitoring.  Upon my evaluation, patient was very confused and was unable to answer any questions.    Blood glucose in the ED is 494 which was treated, hyponatremia at 135, lipase 11, magnesium 1.9, white blood count 13.50, influenza negative, UA not concerning for UTI, urine tox screen negative, chest x-ray pending.    Due to patient's confusion, CT of  head ordered as well as ammonia level, ABG, lactic acid, CBC, mag, and EKG.    Patient's temp 102.8 upon arrival to the observation unit, Tylenol given, will continue to monitor.    O2 order to maintain sats greater than 90%.    Patient very confused trying to get out of bed, and pulling at lines and appears very anxious 0.5 mg Ativan IV given.    Patient had positive sepsis screen, sepsis bundle ordered.    Azactam 2 g IV every 8 hours and vancomycin per pharmacy to dose given for sepsis.    Sitter was ordered for bedside    Lovenox 40 mg subcutaneous every 24 hours ordered for VT E prophylaxis.    We will repeat labs in a.m.    Patient's findings and recommendations were discussed with patient and nursing staff      Code Status:   Code Status and Medical Interventions:   Ordered at: 03/15/19 6888     Level Of Support Discussed With:    Patient     Code Status:    CPR     Medical Interventions (Level of Support Prior to Arrest):    Full           Shania Justin, APRN  03/16/19  6:14 AM        Electronically signed by Ricci Rubio MD at 3/17/2019 10:21 AM       Intake & Output (last day)       03/19 0701 - 03/20 0700 03/20 0701 - 03/21 0700    P.O. 600     Total Intake(mL/kg) 600 (10.2)     Urine (mL/kg/hr) 2390 (1.7)     Stool      Total Output 2390     Net -1790           Urine Unmeasured Occurrence  2 x        Lines, Drains & Airways    Active LDAs     Name:   Placement date:   Placement time:   Site:   Days:    PICC Single Lumen 02/14/19 Left [facility]   02/14/19    1250    Basilic   [address] [place] [address] Medications (active)       Dose Frequency Start End    acetaminophen (TYLENOL) suppository 650 mg 650 mg Every 4 Hours PRN 3/15/2019     Sig - Route: Insert 1 suppository into the rectum Every 4 (Four) Hours As Needed for Fever. - Rectal    acetaminophen (TYLENOL) tablet 650 mg 650 mg Every 4 Hours PRN 3/15/2019     Sig - Route: Take 2 tablets by mouth Every 4 (Four) Hours As Needed for Mild  Pain . - Oral    aluminum-magnesium hydroxide-simethicone (MAALOX MAX) 400-400-40 MG/5ML suspension 15 mL 15 mL Every 6 Hours PRN 3/15/2019     Sig - Route: Take 15 mL by mouth Every 6 (Six) Hours As Needed for Heartburn. - Oral    atorvastatin (LIPITOR) tablet 40 mg 40 mg Daily 3/18/2019     Sig - Route: Take 1 tablet by mouth Daily. - Oral    Cosign for Ordering: Accepted by Berenice Hicks DO on 3/17/2019  9:09 PM    cefdinir (OMNICEF) capsule 300 mg 300 mg Every 12 Hours Scheduled 3/19/2019 3/25/2019    Sig - Route: Take 1 capsule by mouth Every 12 (Twelve) Hours. - Oral    cholecalciferol (VITAMIN D3) capsule 50,000 Units 50,000 Units Weekly 3/20/2019     Sig - Route: Take 1 capsule by mouth 1 (One) Time Per Week. - Oral    CloNIDine (CATAPRES) tablet 0.1 mg 0.1 mg Every 6 Hours PRN 3/16/2019     Sig - Route: Take 1 tablet by mouth Every 6 (Six) Hours As Needed for High Blood Pressure (greater than 160/90). - Oral    clopidogrel (PLAVIX) tablet 75 mg 75 mg Daily 3/18/2019     Sig - Route: Take 1 tablet by mouth Daily. - Oral    dextrose (D50W) 25 g/ 50mL Intravenous Solution 25 g 25 g Every 15 Minutes PRN 3/15/2019     Sig - Route: Infuse 50 mL into a venous catheter Every 15 (Fifteen) Minutes As Needed for Low Blood Sugar (Blood Sugar Less Than 70). - Intravenous    dextrose (GLUTOSE) oral gel 15 g 15 g Every 15 Minutes PRN 3/15/2019     Sig - Route: Take 15 g by mouth Every 15 (Fifteen) Minutes As Needed for Low Blood Sugar (Blood sugar less than 70). - Oral    doxycycline (MONODOX) capsule 100 mg 100 mg Every 12 Hours Scheduled 3/19/2019 3/25/2019    Sig - Route: Take 1 capsule by mouth Every 12 (Twelve) Hours. - Oral    fludrocortisone tablet 0.1 mg 0.1 mg Daily 3/16/2019     Sig - Route: Take 1 tablet by mouth Daily. - Oral    FLUoxetine (PROzac) capsule 20 mg 20 mg Daily 3/16/2019     Sig - Route: Take 1 capsule by mouth Daily. - Oral    gabapentin (NEURONTIN) capsule 600 mg 600 mg 2 Times Daily  3/16/2019     Sig - Route: Take 2 capsules by mouth 2 (Two) Times a Day. - Oral    glucagon (human recombinant) (GLUCAGEN DIAGNOSTIC) injection 1 mg 1 mg As Needed 3/15/2019     Sig - Route: Inject 1 mg under the skin into the appropriate area as directed As Needed (Blood Glucose Less Than 70). - Subcutaneous    haloperidol lactate (HALDOL) injection 2 mg 2 mg Every 4 Hours PRN 3/15/2019     Sig - Route: Inject 0.4 mL into the appropriate muscle as directed by prescriber Every 4 (Four) Hours As Needed for Agitation. - Intramuscular    hydrALAZINE (APRESOLINE) injection 10 mg 10 mg Every 6 Hours PRN 3/19/2019     Sig - Route: Infuse 0.5 mL into a venous catheter Every 6 (Six) Hours As Needed for High Blood Pressure. - Intravenous    hydrALAZINE (APRESOLINE) tablet 25 mg 25 mg Every 8 Hours Scheduled 3/20/2019     Sig - Route: Take 1 tablet by mouth Every 8 (Eight) Hours. - Oral    HYDROcodone-acetaminophen (NORCO)  MG per tablet 1 tablet 1 tablet Every 6 Hours PRN 3/16/2019     Sig - Route: Take 1 tablet by mouth Every 6 (Six) Hours As Needed for Mild Pain . - Oral    insulin aspart (novoLOG) injection 0-7 Units 0-7 Units 4 Times Daily Before Meals & Nightly 3/16/2019     Sig - Route: Inject 0-7 Units under the skin into the appropriate area as directed 4 (Four) Times a Day Before Meals & at Bedtime. - Subcutaneous    insulin detemir (LEVEMIR) injection 20 Units 20 Units Every 12 Hours Scheduled 3/20/2019     Sig - Route: Inject 20 Units under the skin into the appropriate area as directed Every 12 (Twelve) Hours. - Subcutaneous    LORazepam (ATIVAN) injection 0.5 mg 0.5 mg Every 6 Hours PRN 3/16/2019 3/26/2019    Sig - Route: Infuse 0.25 mL into a venous catheter Every 6 (Six) Hours As Needed for Anxiety. - Intravenous    Magnesium Sulfate 2 gram / 50mL Infusion (GIVE X 3 BAGS TO EQUAL 6GM TOTAL DOSE) - Mg 1.1 - 1.5 mg/dl 2 g As Needed 3/20/2019     Sig - Route: Infuse 50 mL into a venous catheter As Needed  "(See Administration Instructions). - Intravenous    Cosign for Ordering: Accepted by Mark Crawford DO on 3/20/2019 10:50 AM    Linked Group 1:  \"Or\" Linked Group Details        Magnesium Sulfate 2 gram Bolus, followed by 8 gram infusion (total Mg dose 10 grams)- Mg less than or equal to 1mg/dL 2 g As Needed 3/20/2019     Sig - Route: Infuse 50 mL into a venous catheter As Needed (See Administration Instructions). - Intravenous    Cosign for Ordering: Accepted by Mark Crawford DO on 3/20/2019 10:50 AM    Linked Group 1:  \"Or\" Linked Group Details        Magnesium Sulfate 4 gram infusion- Mg 1.6-1.9 mg/dL 4 g As Needed 3/20/2019     Sig - Route: Infuse 100 mL into a venous catheter As Needed (See Administration Instructions). - Intravenous    Cosign for Ordering: Accepted by Mark Crawford DO on 3/20/2019 10:50 AM    Linked Group 1:  \"Or\" Linked Group Details        Magnesium Sulfate 4 gram infusion- Mg 1.6-1.9 mg/dL 4 g Once 3/20/2019     Sig - Route: Infuse 100 mL into a venous catheter 1 (One) Time. - Intravenous    metoprolol succinate XL (TOPROL-XL) 24 hr tablet 25 mg 25 mg Every 24 Hours Scheduled 3/20/2019     Sig - Route: Take 1 tablet by mouth Daily. - Oral    multivitamin (DAILY FRAN) tablet 1 tablet 1 tablet Daily 3/19/2019     Sig - Route: Take 1 tablet by mouth Daily. - Oral    nitroglycerin (NITROSTAT) SL tablet 0.4 mg 0.4 mg Every 5 Minutes PRN 3/15/2019     Sig - Route: Place 1 tablet under the tongue Every 5 (Five) Minutes As Needed for Chest Pain (Chest Pain With Systolic Blood Pressure Greater Than 100). - Sublingual    pantoprazole (PROTONIX) injection 40 mg 40 mg Every 12 Hours Scheduled 3/17/2019     Sig - Route: Infuse 10 mL into a venous catheter Every 12 (Twelve) Hours. - Intravenous    Cosign for Ordering: Accepted by Berenice Hicks DO on 3/17/2019  9:09 PM    Pharmacy Consult - Pharmacy to dose  Continuous PRN 3/17/2019     Sig - Route: Continuous As " "Needed for Consult. - Does not apply    Cosign for Ordering: Accepted by Berenice Hicks DO on 3/17/2019  9:07 PM    potassium chloride (K-DUR,KLOR-CON) CR tablet 40 mEq 40 mEq Every 4 Hours 3/20/2019 3/20/2019    Sig - Route: Take 2 tablets by mouth Every 4 (Four) Hours. - Oral    potassium chloride (K-DUR,KLOR-CON) ER tablet 40 mEq 40 mEq As Needed 3/20/2019     Sig - Route: Take 4 tablets by mouth As Needed (potassium replacement.  see admin instructions). - Oral    Cosign for Ordering: Accepted by Mark Crawford DO on 3/20/2019 10:50 AM    Linked Group 2:  \"Or\" Linked Group Details        potassium chloride (KLOR-CON) packet 40 mEq 40 mEq As Needed 3/20/2019     Sig - Route: Take 40 mEq by mouth As Needed (potassium replacement, see admin instructions). - Oral    Cosign for Ordering: Accepted by Mark Crawford DO on 3/20/2019 10:50 AM    Linked Group 2:  \"Or\" Linked Group Details        potassium chloride 10 mEq in 100 mL IVPB 10 mEq Every 1 Hour PRN 3/20/2019     Sig - Route: Infuse 100 mL into a venous catheter Every 1 (One) Hour As Needed (potassium protocol PERIPHERAL - see admin instructions). - Intravenous    Cosign for Ordering: Accepted by Mark Crawford DO on 3/20/2019 10:50 AM    Linked Group 2:  \"Or\" Linked Group Details        regadenoson (LEXISCAN) injection 0.4 mg 0.4 mg Once in Imaging 3/19/2019 3/19/2019    Sig - Route: Infuse 5 mL into a venous catheter Once. - Intravenous    rOPINIRole (REQUIP) tablet 0.5 mg 0.5 mg 2 Times Daily 3/16/2019     Sig - Route: Take 2 tablets by mouth 2 (Two) Times a Day. - Oral    sodium chloride 0.9 % bolus 1,593 mL 30 mL/kg × 53.1 kg As Needed 3/16/2019     Sig - Route: Infuse 1,593 mL into a venous catheter As Needed (MAP Less Than 65 or SPB <90 or Lactic Acid 4 or Higher). - Intravenous    sodium chloride 0.9 % flush 3 mL 3 mL Every 12 Hours Scheduled 3/16/2019     Sig - Route: Infuse 3 mL into a venous catheter Every 12 (Twelve) " Hours. - Intravenous    sodium chloride 0.9 % flush 3-10 mL 3-10 mL As Needed 3/15/2019     Sig - Route: Infuse 3-10 mL into a venous catheter As Needed for Line Care. - Intravenous    technetium sestamibi (CARDIOLITE) injection 1 dose 1 dose Once in Imaging 3/19/2019 3/19/2019    Sig - Route: Infuse 1 dose into a venous catheter Once. - Intravenous    temazepam (RESTORIL) capsule 30 mg 30 mg Nightly 3/16/2019     Sig - Route: Take 2 capsules by mouth Every Night. - Oral    tiZANidine (ZANAFLEX) tablet 4 mg 4 mg Every 6 Hours PRN 3/16/2019     Sig - Route: Take 1 tablet by mouth Every 6 (Six) Hours As Needed for Muscle Spasms. - Oral    amLODIPine (NORVASC) tablet 10 mg (Discontinued) 10 mg Every 24 Hours Scheduled 3/19/2019 3/20/2019    Sig - Route: Take 1 tablet by mouth Daily. - Oral    amLODIPine (NORVASC) tablet 5 mg (Discontinued) 5 mg Every 24 Hours Scheduled 3/21/2019 3/20/2019    Sig - Route: Take 1 tablet by mouth Daily. - Oral    cefepime (MAXIPIME) 2 g/100 mL 0.9% NS (mbp) (Discontinued) 2 g Every 24 Hours 3/17/2019 3/19/2019    Sig - Route: Infuse 100 mL into a venous catheter Daily. - Intravenous    escitalopram (LEXAPRO) tablet 5 mg (Discontinued) 5 mg Nightly 3/17/2019 3/19/2019    Sig - Route: Take 0.5 tablets by mouth Every Night. - Oral    Cosign for Ordering: Accepted by Berenice Hicks DO on 3/17/2019  9:07 PM    insulin detemir (LEVEMIR) injection 15 Units (Discontinued) 15 Units Every 12 Hours Scheduled 3/17/2019 3/20/2019    Sig - Route: Inject 15 Units under the skin into the appropriate area as directed Every 12 (Twelve) Hours. - Subcutaneous    Cosign for Ordering: Accepted by Berenice Hicks DO on 3/17/2019  9:07 PM    metoprolol tartrate (LOPRESSOR) tablet 12.5 mg (Discontinued) 12.5 mg Every 12 Hours Scheduled 3/18/2019 3/19/2019    Sig - Route: Take 0.5 tablets by mouth Every 12 (Twelve) Hours. - Oral    Cosign for Ordering: Accepted by Vinny Sandoval MD on 3/17/2019   9:39 PM    metoprolol tartrate (LOPRESSOR) tablet 25 mg (Discontinued) 25 mg Every 12 Hours Scheduled 3/19/2019 3/20/2019    Sig - Route: Take 1 tablet by mouth Every 12 (Twelve) Hours. - Oral    vancomycin (VANCOCIN) 1,000 mg in sodium chloride 0.9 % 250 mL IVPB (Discontinued) 1,000 mg Every 24 Hours 3/18/2019 3/19/2019    Sig - Route: Infuse 1,000 mg into a venous catheter Daily. - Intravenous            Lab Results (last 24 hours)     Procedure Component Value Units Date/Time    POC Glucose Once [[id number]]  (Abnormal) Collected:  03/20/19 1113    Specimen:  Blood Updated:  03/20/19 1120     Glucose 217 mg/dL     POC Glucose Once [[id number]]  (Normal) Collected:  03/20/19 0724    Specimen:  Blood Updated:  03/20/19 0731     Glucose 128 mg/dL     Blood Culture - Blood, Arm, Right [[id number]] Collected:  03/16/19 0334    Specimen:  Blood from Arm, Right Updated:  03/20/19 0501     Blood Culture No growth at 4 days    Blood Culture - Blood, Arm, Left [[id number]] Collected:  03/16/19 0334    Specimen:  Blood from Arm, Left Updated:  03/20/19 0501     Blood Culture No growth at 4 days    Comprehensive Metabolic Panel [[id number]]  (Abnormal) Collected:  03/20/19 0109    Specimen:  Blood Updated:  03/20/19 0458     Glucose 208 mg/dL      BUN 19 mg/dL      Creatinine 1.03 mg/dL      Sodium 141 mmol/L      Potassium 3.3 mmol/L      Chloride 106 mmol/L      CO2 23.4 mmol/L      Calcium 8.3 mg/dL      Total Protein 5.4 g/dL      Albumin 2.69 g/dL      ALT (SGPT) 8 U/L      AST (SGOT) 13 U/L      Alkaline Phosphatase 101 U/L      Total Bilirubin 0.2 mg/dL      eGFR Non African Amer 54 mL/min/1.73      Globulin 2.7 gm/dL      A/G Ratio 1.0 g/dL      BUN/Creatinine Ratio 18.4     Anion Gap 11.6 mmol/L     Narrative:       GFR Normal >60  Chronic Kidney Disease <60  Kidney Failure <15    Magnesium [[id number]]  (Normal) Collected:  03/20/19 0109    Specimen:  Blood Updated:  03/20/19 0449     Magnesium 1.9 mg/dL     CBC &  Differential [[id number]] Collected:  03/20/19 0109    Specimen:  Blood Updated:  03/20/19 0200    Narrative:       The following orders were created for panel order CBC & Differential.  Procedure                               Abnormality         Status                     ---------                               -----------         ------                     CBC Auto Differential[[id number]]        Abnormal            Final result                 Please view results for these tests on the individual orders.    CBC Auto Differential [[id number]]  (Abnormal) Collected:  03/20/19 0109    Specimen:  Blood Updated:  03/20/19 0200     WBC 4.53 10*3/mm3      RBC 3.77 10*6/mm3      Hemoglobin 10.9 g/dL      Hematocrit 33.5 %      MCV 88.9 fL      MCH 28.9 pg      MCHC 32.5 g/dL      RDW 13.3 %      RDW-SD 41.9 fl      MPV 10.7 fL      Platelets 128 10*3/mm3      Neutrophil % 56.3 %      Lymphocyte % 32.7 %      Monocyte % 6.4 %      Eosinophil % 4.2 %      Basophil % 0.4 %      Immature Grans % 0.0 %      Neutrophils, Absolute 2.55 10*3/mm3      Lymphocytes, Absolute 1.48 10*3/mm3      Monocytes, Absolute 0.29 10*3/mm3      Eosinophils, Absolute 0.19 10*3/mm3      Basophils, Absolute 0.02 10*3/mm3      Immature Grans, Absolute 0.00 10*3/mm3     Protein, Urine, 24 Hour - Urine, Clean Catch [[id number]]  (Abnormal) Collected:  03/20/19 0044    Specimen:  24 Hour Urine from Urine, Clean Catch Updated:  03/20/19 0115     Protein, 24H Urine 1,773.0 mg/24hours      Total Protein, Urine 98.5 mg/dL      24H Urine Volume 1,800 mL      Time (Hours) 24 hrs     POC Glucose Once [[id number]]  (Abnormal) Collected:  03/19/19 2103    Specimen:  Blood Updated:  03/19/19 2109     Glucose 308 mg/dL     POC Glucose Once [[id number]]  (Abnormal) Collected:  03/19/19 1456    Specimen:  Blood Updated:  03/19/19 1504     Glucose 244 mg/dL         Orders (last 24 hrs)     Start     Ordered    03/21/19 0900  amLODIPine (NORVASC) tablet 5 mg  Every 24  Hours Scheduled,   Status:  Discontinued      03/20/19 1056    03/21/19 0000  atorvastatin (LIPITOR) 40 MG tablet  Daily      03/20/19 1124    03/21/19 0000  amLODIPine (NORVASC) 5 MG tablet  Every 24 Hours Scheduled,   Status:  Discontinued      03/20/19 1124    03/21/19 0000  multivitamin (DAILY FRAN) tablet tablet  Daily      03/20/19 1124    03/20/19 2100  insulin detemir (LEVEMIR) injection 20 Units  Every 12 Hours Scheduled      03/20/19 1056    03/20/19 1400  hydrALAZINE (APRESOLINE) tablet 25 mg  Every 8 Hours Scheduled      03/20/19 1129    03/20/19 1215  metoprolol succinate XL (TOPROL-XL) 24 hr tablet 25 mg  Every 24 Hours Scheduled      03/20/19 1129    03/20/19 1200  Magnesium Sulfate 4 gram infusion- Mg 1.6-1.9 mg/dL  Once      03/20/19 1000    03/20/19 1200  potassium chloride (K-DUR,KLOR-CON) CR tablet 40 mEq  Every 4 Hours      03/20/19 1000    03/20/19 1124  Discontinue Telemetry  Once      03/20/19 1124    03/20/19 1123  Discontinue IV  Once      03/20/19 1124    03/20/19 1123  Discontinue PICC  Once      03/20/19 1124    03/20/19 1121  POC Glucose Once  Once      03/20/19 1113    03/20/19 1117  Discharge patient  Once      03/20/19 1124    03/20/19 0953  Magnesium Sulfate 2 gram Bolus, followed by 8 gram infusion (total Mg dose 10 grams)- Mg less than or equal to 1mg/dL  As Needed      03/20/19 0953    03/20/19 0953  Magnesium Sulfate 2 gram / 50mL Infusion (GIVE X 3 BAGS TO EQUAL 6GM TOTAL DOSE) - Mg 1.1 - 1.5 mg/dl  As Needed      03/20/19 0953    03/20/19 0953  Magnesium Sulfate 4 gram infusion- Mg 1.6-1.9 mg/dL  As Needed      03/20/19 0953    03/20/19 0953  potassium chloride (K-DUR,KLOR-CON) ER tablet 40 mEq  As Needed      03/20/19 0953    03/20/19 0953  potassium chloride (KLOR-CON) packet 40 mEq  As Needed      03/20/19 0953    03/20/19 0953  potassium chloride 10 mEq in 100 mL IVPB  Every 1 Hour PRN      03/20/19 0953    03/20/19 0900  cholecalciferol (VITAMIN D3) capsule 50,000 Units   Weekly      03/16/19 1127    03/20/19 0812  Please please schedule the patient to see Dr. Burks in 2 weeks  Nursing Communication  Once     Comments:  Please please schedule the patient to see Dr. Burks in 2 weeks    03/20/19 0811    03/20/19 0732  POC Glucose Once  Once      03/20/19 0724    03/20/19 0600  CBC Auto Differential  PROCEDURE ONCE      03/20/19 0002    03/20/19 0500  ECG 12 Lead  Tomorrow AM      03/19/19 0958    03/20/19 0039  Protein, Urine, 24 Hour - Urine, Clean Catch  Once      03/20/19 0042    03/20/19 0000  metoprolol tartrate (LOPRESSOR) 25 MG tablet  Every 12 Hours Scheduled,   Status:  Discontinued      03/20/19 1124    03/20/19 0000  cefdinir (OMNICEF) 300 MG capsule  Every 12 Hours Scheduled      03/20/19 1124    03/20/19 0000  doxycycline (MONODOX) 100 MG capsule  Every 12 Hours Scheduled      03/20/19 1124    03/20/19 0000  furosemide (LASIX) 20 MG tablet  Daily PRN      03/20/19 1124    03/20/19 0000  potassium chloride (K-DUR) 10 MEQ CR tablet  Daily PRN      03/20/19 1124    03/20/19 0000  Discharge Follow-up with PCP      03/20/19 1124    03/20/19 0000  Discharge Follow-up with Specified Provider: Follow up with Dr. Barragan in 1 week.      03/20/19 1124    03/20/19 0000  Discharge Follow-up with Specified Provider: Follow up with Dr. Burks in 2 weeks.      03/20/19 1124    03/20/19 0000  Discharge Follow-up with Specialty: Follow up with Dr. Helton in 4 weeks.      03/20/19 1124    03/20/19 0000  Basic Metabolic Panel     Comments:  Please send results to Soha Paulson and Kimmie.      03/20/19 1125    03/20/19 0000  hydrALAZINE (APRESOLINE) 25 MG tablet  Every 8 Hours Scheduled      03/20/19 1131    03/20/19 0000  metoprolol succinate XL (TOPROL-XL) 25 MG 24 hr tablet  Every 24 Hours Scheduled      03/20/19 1131    03/19/19 2110  POC Glucose Once  Once      03/19/19 2103    03/19/19 2100  metoprolol tartrate (LOPRESSOR) tablet 25 mg  Every 12 Hours Scheduled,   Status:  Discontinued       03/19/19 1453    03/19/19 2100  doxycycline (MONODOX) capsule 100 mg  Every 12 Hours Scheduled      03/19/19 1528    03/19/19 1800  cefdinir (OMNICEF) capsule 300 mg  Every 12 Hours Scheduled      03/19/19 1528    03/19/19 1700  amLODIPine (NORVASC) tablet 10 mg  Every 24 Hours Scheduled,   Status:  Discontinued      03/19/19 1521    03/19/19 1614  Diet Regular; Consistent Carbohydrate  Diet Effective Now      03/19/19 1615    03/19/19 1526  Urinalysis With Culture If Indicated - Urine, Clean Catch  Once      03/19/19 1525    03/19/19 1524  hydrALAZINE (APRESOLINE) injection 10 mg  Every 6 Hours PRN      03/19/19 1524    03/19/19 1505  POC Glucose Once  Once      03/19/19 1456    03/19/19 1400  regadenoson (LEXISCAN) injection 0.4 mg  Once in Imaging      03/19/19 1305    03/19/19 1330  technetium sestamibi (CARDIOLITE) injection 1 dose  Once in Imaging      03/19/19 1428    03/19/19 0900  multivitamin (DAILY FRAN) tablet 1 tablet  Daily      03/18/19 2319    03/18/19 2200  vancomycin (VANCOCIN) 1,000 mg in sodium chloride 0.9 % 250 mL IVPB  Every 24 Hours,   Status:  Discontinued      03/18/19 1257    03/18/19 1700  clopidogrel (PLAVIX) tablet 75 mg  Daily      03/18/19 1457    03/18/19 0900  atorvastatin (LIPITOR) tablet 40 mg  Daily      03/17/19 2108    03/18/19 0900  metoprolol tartrate (LOPRESSOR) tablet 12.5 mg  Every 12 Hours Scheduled,   Status:  Discontinued      03/17/19 2135    03/18/19 0600  Comprehensive Metabolic Panel  Daily      03/17/19 1925    03/17/19 2200  cefepime (MAXIPIME) 2 g/100 mL 0.9% NS (mbp)  Every 24 Hours,   Status:  Discontinued      03/17/19 0752    03/17/19 2200  pantoprazole (PROTONIX) injection 40 mg  Every 12 Hours Scheduled      03/17/19 2101 03/17/19 2100  escitalopram (LEXAPRO) tablet 5 mg  Nightly,   Status:  Discontinued      03/17/19 1925 03/17/19 2100  insulin detemir (LEVEMIR) injection 15 Units  Every 12 Hours Scheduled,   Status:  Discontinued      03/17/19 1930     03/17/19 1919  Pharmacy Consult - Pharmacy to dose  Continuous PRN      03/17/19 1925    03/16/19 2100  temazepam (RESTORIL) capsule 30 mg  Nightly      03/16/19 1127    03/16/19 1300  fludrocortisone tablet 0.1 mg  Daily      03/16/19 1127    03/16/19 1300  FLUoxetine (PROzac) capsule 20 mg  Daily      03/16/19 1127    03/16/19 1300  gabapentin (NEURONTIN) capsule 600 mg  2 Times Daily      03/16/19 1127    03/16/19 1300  rOPINIRole (REQUIP) tablet 0.5 mg  2 Times Daily      03/16/19 1127    03/16/19 1300  tiZANidine (ZANAFLEX) tablet 4 mg  Every 6 Hours PRN      03/16/19 1127    03/16/19 1118  HYDROcodone-acetaminophen (NORCO)  MG per tablet 1 tablet  Every 6 Hours PRN      03/16/19 1127    03/16/19 1110  CloNIDine (CATAPRES) tablet 0.1 mg  Every 6 Hours PRN      03/16/19 1127    03/16/19 0700  POC Glucose 4x Daily AC & at Bedtime  4 Times Daily Before Meals & at Bedtime      03/15/19 2339 03/16/19 0600  CBC & Differential  Daily      03/15/19 2339 03/16/19 0600  Magnesium  Daily      03/15/19 2339 03/16/19 0211  sodium chloride 0.9 % bolus 1,593 mL  As Needed      03/16/19 0213    03/16/19 0105  LORazepam (ATIVAN) injection 0.5 mg  Every 6 Hours PRN      03/16/19 0107    03/16/19 0030  sodium chloride 0.9 % flush 3 mL  Every 12 Hours Scheduled      03/15/19 2339    03/16/19 0030  insulin aspart (novoLOG) injection 0-7 Units  4 Times Daily Before Meals & Nightly      03/15/19 2339    03/15/19 2341  acetaminophen (TYLENOL) suppository 650 mg  Every 4 Hours PRN      03/15/19 2345    03/15/19 2339  dextrose (GLUTOSE) oral gel 15 g  Every 15 Minutes PRN      03/15/19 2339    03/15/19 2339  dextrose (D50W) 25 g/ 50mL Intravenous Solution 25 g  Every 15 Minutes PRN      03/15/19 2339    03/15/19 2339  glucagon (human recombinant) (GLUCAGEN DIAGNOSTIC) injection 1 mg  As Needed      03/15/19 2339    03/15/19 2338  sodium chloride 0.9 % flush 3-10 mL  As Needed      03/15/19 2339    03/15/19 2338   nitroglycerin (NITROSTAT) SL tablet 0.4 mg  Every 5 Minutes PRN      03/15/19 2339    03/15/19 2338  acetaminophen (TYLENOL) tablet 650 mg  Every 4 Hours PRN      03/15/19 2339    03/15/19 2338  aluminum-magnesium hydroxide-simethicone (MAALOX MAX) 400-400-40 MG/5ML suspension 15 mL  Every 6 Hours PRN      03/15/19 2339    03/15/19 2240  haloperidol lactate (HALDOL) injection 2 mg  Every 4 Hours PRN      03/15/19 2241    03/15/19 0000  nitrofurantoin, macrocrystal-monohydrate, (MACROBID) 100 MG capsule  2 Times Daily,   Status:  Discontinued      03/15/19 2022    Unscheduled  ECG 12 Lead  As Needed     Comments:  Nurse to Release if Patient Expericences Acute Chest Pain or Dysrhythmias    03/15/19 2339    Unscheduled  Potassium  As Needed     Comments:  For Ventricular Arrhythmias      03/15/19 2339    Unscheduled  Magnesium  As Needed     Comments:  For Ventricular Arrhythmias      03/15/19 2339    Unscheduled  Troponin  As Needed     Comments:  For Chest Pain      03/15/19 2339    Unscheduled  Digoxin Level  As Needed     Comments:  For Atrial Arrhythmias      03/15/19 2339    Unscheduled  Blood Gas, Arterial  As Needed     Comments:  Per O2 PolicyNotify Physician      03/15/19 2339    Unscheduled  Magnesium  As Needed      03/16/19 0708    Unscheduled  Potassium  As Needed      03/16/19 0708    Unscheduled  Change Dressing to IV Site As Needed When Damp, Loose or Soiled  As Needed      03/16/19 0742    Unscheduled  Change Needleless Connectors  As Needed     Comments:  Change Needleless Connectors When:  - Removed For Any Reason  - Residual Blood or Debris Within Connector  - Prior to Drawing Blood Cultures  - Contamination of Connector  - After Administration of Blood or Blood Components    03/16/19 0742    Unscheduled  Occult Blood X 1, Stool - Stool, Per Rectum  As Needed      03/17/19 2125    Unscheduled  Wound Care Follow-up  As Needed      03/20/19 0143    Unscheduled  Magnesium  As Needed      03/20/19  0953    Unscheduled  Potassium  As Needed      19 0953    --  gabapentin (NEURONTIN) 800 MG tablet  3 Times Daily      03/15/19 2247    --  HYDROcodone-acetaminophen (NORCO)  MG per tablet  Every 4 Hours PRN      19 1020    --  pravastatin (PRAVACHOL) 20 MG tablet  Daily      19 1022    --  CloNIDine (CATAPRES) 0.1 MG tablet  Every 6 Hours PRN      19 1022    --  promethazine (PHENERGAN) 25 MG tablet  Every 6 Hours PRN      19 1022    --  rOPINIRole (REQUIP) 0.5 MG tablet  2 Times Daily      19 1022             Physician Progress Notes (last 24 hours) (Notes from 3/19/2019 11:58 AM through 3/20/2019 11:58 AM)      Crescencio Helton MD at 3/20/2019  8:22 AM             LOS: 3 days     Name: Reny Elena  Age/Sex: 59 y.o. female  :  1960        PCP: Yandel Paulson MD    Active Problems:    Hyperglycemia    Confusion      Admission Information: Reny Elena is a 59 y.o. female with past medical history significant for hypertension, dyslipidemia, diabetes mellitus type 2 insulin dependent, chronic kidney disease, peptic ulcer disease and right tib/fib fracture status post repair. She was admitted to [facility] on 3/15/19 with complaints of nausea, vomiting and fever. Initial evaluation in the ED revealed a fever of 103.3 F, hypotension and hypoxemia. Chest x-ray was unremarkable. White blood cell count was 13.5 and creatinine was 1.22. She was diagnosed with sepsis and was admitted for further evaluation. During admission she developed acute kidney injury, creatinine 1.7, nephrology as consulted.  Troponin on 3/17/19 was 0.105 and peaked at 0.146. It has trended down to 0.118. EKG revealed changes concerning for ischemia, hence cardiology was consulted. Upon evaluation today, patient denies chest pain and shortness of breath. She reports the fever, abdominal pain and vomiting has resolved.Overall she reports felling better. She is not a smoker. She  has a history of peptic ulcers that was diagnosed in 2017. Her last echocardiogram on 11/22/18 revealed an EF of 56-60% with mild mitral and tricuspid regurgitation and mild aortic stenosis. Echocardiogram has been ordered by the primary team and stools for occult blood are pending.          Following for: possible non-ST elevation MI    Telemetry Monitoring: sinus rhythm 60s to 70s    Interval history: She is resting in bed this morning. States she is feeling much better today. She denies chest pain, shortness of breath, or palpitations. Stress test revealed no evidence of ischemia.    Subjective     ROS    Vital Signs  Vitals:    03/19/19 1834 03/20/19 0035 03/20/19 0310 03/20/19 0633   BP: 111/62 161/81 90/53 100/55   BP Location: Right arm Left arm Left arm Left arm   Patient Position: Lying Lying Lying Lying   Pulse: 73 73 61 63   Resp: 18 18 18 18   Temp: 98 °F (36.7 °C) 97.6 °F (36.4 °C) 98 °F (36.7 °C) 97.9 °F (36.6 °C)   TempSrc: Oral Oral Oral Oral   SpO2: 97% 97% 93% 98%   Weight:   59 kg (130 lb 1.6 oz)    Height:         Body mass index is 21.65 kg/m².      Intake/Output Summary (Last 24 hours) at 3/20/2019 0822  Last data filed at 3/19/2019 2300  Gross per 24 hour   Intake 600 ml   Output 2390 ml   Net -1790 ml       Physical Exam   Constitutional: She is oriented to person, place, and time. She appears well-developed and well-nourished.   HENT:   Head: Normocephalic and atraumatic.   Neck: No JVD present.   Cardiovascular: Normal rate, regular rhythm and normal heart sounds. Exam reveals no S3 and no S4.   No murmur heard.  Pulmonary/Chest: Effort normal and breath sounds normal. She has no wheezes. She has no rales.   Abdominal: Soft. Bowel sounds are normal.   Musculoskeletal: She exhibits edema (mild edema RLE).   Neurological: She is alert and oriented to person, place, and time.   Skin: Skin is warm and dry.   Surgical scarring noted RLE     Psychiatric: She has a normal mood and affect. Her  behavior is normal.              Results Review:     Results from last 7 days   Lab Units 19  0109 19  0333 19  0055 19  0416 03/16/19  0334 03/16/19  0053 03/15/19  1928   WBC 10*3/mm3 4.53 4.52 5.65  --  8.84 11.84* 11.21* 13.50*   HEMOGLOBIN g/dL 10.9* 11.6* 10.0* 10.4* 11.0* 13.9 13.8 13.9   PLATELETS 10*3/mm3 128* 116* 131*  --  140 147 147 205     Results from last 7 days   Lab Units 19  0109 19  0333 195 196 03/16/19  0053 03/15/19  1928   SODIUM mmol/L 141 138 138 139 135* 135*   POTASSIUM mmol/L 3.3* 4.9 4.0 3.8 3.8 4.1   CHLORIDE mmol/L 106 109* 110* 109* 98 93*   CO2 mmol/L 23.4 19.8* 21.1* 21.2* 18.1* 23.7   BUN mg/dL 19 19 28* 31* 19 19   CREATININE mg/dL 1.03* 1.34* 1.34* 1.70* 1.14* 1.22*   CALCIUM mg/dL 8.3* 8.3* 7.8* 7.9* 8.7 9.5   GLUCOSE mg/dL 208* 248* 231* 61* 301* 494*     Results from last 7 days   Lab Units 19  1249 19  0751 195 19   TROPONIN T ng/mL 0.118* 0.146* 0.125* 0.105*     Reny Elena   Echocardiogram   Order# [id number]   Reading physician:   Crescencio Helton MD Ordering physician:   Ashely Tatum PA Study date: 3/18/19   Patient Information     Patient Name  Reny Elena MRN  [id number] Sex  Female  (Age)  1960 (59 y.o.)   Admission Information     Admission Date/Time Discharge Date/Time Room/Bed   03/15/19  1901  3315/2S   Sedation Narrator Report     Interpretation Summary     · Normal left ventricular cavity size and wall thickness noted.  · Left ventricular diastolic dysfunction (grade I) consistent with impaired relaxation.  · Left ventricular systolic function is mildly decreased.  · Estimated EF appears to be in the range of 46 - 50%.  · The aortic valve is abnormal in structure. The valve exhibits sclerosis. Mild aortic valve regurgitation is present. No aortic valve stenosis is present.  · The mitral valve is normal in structure. Mild mitral  valve regurgitation is present. No significant mitral valve stenosis is present.  · The tricuspid valve is normal. No evidence of tricuspid valve stenosis is present. No tricuspid valve regurgitation is present.  · There is no evidence of pericardial effusion.  · Comments: LV systolic function has decreased since 11/22/18(LVEF decreased from 56-60% then to 46-50% now)          I reviewed the patient's new clinical results.  I reviewed the patient's new imaging results and agree with the interpretation.  I personally viewed and interpreted the patient's EKG/Telemetry data      Medication Review:     amLODIPine 10 mg Oral Q24H   atorvastatin 40 mg Oral Daily   cefdinir 300 mg Oral Q12H   cholecalciferol 50,000 Units Oral Weekly   clopidogrel 75 mg Oral Daily   doxycycline 100 mg Oral Q12H   fludrocortisone 0.1 mg Oral Daily   FLUoxetine 20 mg Oral Daily   gabapentin 600 mg Oral BID   insulin aspart 0-7 Units Subcutaneous 4x Daily AC & at Bedtime   insulin detemir 15 Units Subcutaneous Q12H   metoprolol tartrate 25 mg Oral Q12H   multivitamin 1 tablet Oral Daily   pantoprazole 40 mg Intravenous Q12H   rOPINIRole 0.5 mg Oral BID   sodium chloride 3 mL Intravenous Q12H   temazepam 30 mg Oral Nightly       Pharmacy Consult - Pharmacy to dose        Assessment:  1. Elevated Troponin, Peaked at 0.146, trended down to 0.118. Suspected acute non-ST elevation MI.  2. Abnormal EKG, T wave inversion concerning for anterior wall ischemia.  3. Essential Hypertension, controlled  4. Acute kidney injury, On Stage III CKD, Creatinine 1.03 today, Nephrology managing  5. Sepsis, Managed by the primary team  6. Anemia with a drop in H/H from 13.9 to 10.0 since 3/16/19, stool for occult blood pending, Hemoglobin  10.9 today  7. Acute hypoxic respiratory failure,resolved.  8. Prolonged QTc, 489 ms.  9. Diabetes Mellitus, type 2  10. Dyslipidemia, on statin therapy  11. Mild cardiomyopathy with EF 46-50%      Recommendations:  1. Given her  cardiomyopathy, will add hydralazine 25 mg p.o. 3 times daily and Toprol-XL 25 mg daily and discontinue the amlodipine and metoprolol tartrate.  2. She is okay for discharge home from cardiac standpoint and follow-up in our office in 1-2 weeks.    I discussed the patients findings and my recommendations with patient and Dr. Hollins.      DARIUS Rider, acting as scribe for Dr. Crescencio Helton MD, [facility].  03/20/19  8:22 AM    ICrescencio MD, [facility], personally performed the services described in this documentation as scribed by the above named individual in my presence, made necessary changes and the note is both accurate and complete.     Crescencio Helton MD, [facility]  03/20/19  8:22 AM      Electronically signed by Crescencio Helton MD at 3/20/2019 11:30 AM     Santiago Burks MD at 3/20/2019  6:32 AM          Nephrology Progress Note      Subjective     Patient is feeling much better patient denies any nausea vomiting or diarrhea    Objective       Vitals signs :     Temp:  [97.5 °F (36.4 °C)-98 °F (36.7 °C)] 98 °F (36.7 °C)  Heart Rate:  [61-84] 61  Resp:  [18] 18  BP: ()/() 90/53    I/O last 3 completed shifts:  In: 1200 [P.O.:1200]  Out: 3990 [Urine:3990]  I/O this shift:  In: -   Out: 600 [Urine:600]    Physical Exam:    General Appearance : He is alert answering most of the question  Head  :  normocephalic, without obvious abnormality and atraumatic  Eyes  :  no icterus, no pallor and PERRLA  Neck  :  no adenopathy, suppple, no carotid bruit, no JVD   Lungs : clear to auscultation, respirations regular and unlabored  Heart :  regular rhythm & normal rate, normal S1, S2 and no murmur, no nahid, no rub  Abdomen : normal bowel sounds, no masses,  soft non-tender  Extremities : moves extremities well, TRACE  edema, no cyanosis and no redness  Skin :  no bleeding, bruising or rash  Neurologic : Mildly confused      Laboratory Data :         WBC WBC   Date Value Ref Range Status   03/20/2019 4.53  3.40 - 10.80 10*3/mm3 Final   03/19/2019 4.52 3.40 - 10.80 10*3/mm3 Final   03/18/2019 5.65 3.40 - 10.80 10*3/mm3 Final      HGB Hemoglobin   Date Value Ref Range Status   03/20/2019 10.9 (L) 12.0 - 15.9 g/dL Final   03/19/2019 11.6 (L) 12.0 - 15.9 g/dL Final   03/18/2019 10.0 (L) 12.0 - 15.9 g/dL Final   03/17/2019 10.4 (L) 12.0 - 15.9 g/dL Final      HCT Hematocrit   Date Value Ref Range Status   03/20/2019 33.5 (L) 34.0 - 46.6 % Final   03/19/2019 35.6 34.0 - 46.6 % Final   03/18/2019 31.1 (L) 34.0 - 46.6 % Final   03/17/2019 32.2 (L) 34.0 - 46.6 % Final      Platlets No results found for: LABPLAT   MCV MCV   Date Value Ref Range Status   03/20/2019 88.9 79.0 - 97.0 fL Final   03/19/2019 89.9 79.0 - 97.0 fL Final   03/18/2019 90.1 79.0 - 97.0 fL Final          Sodium Sodium   Date Value Ref Range Status   03/20/2019 141 136 - 145 mmol/L Final   03/19/2019 138 136 - 145 mmol/L Final   03/18/2019 138 136 - 145 mmol/L Final      Potassium Potassium   Date Value Ref Range Status   03/20/2019 3.3 (L) 3.5 - 5.2 mmol/L Final   03/19/2019 4.9 3.5 - 5.2 mmol/L Final   03/18/2019 4.0 3.5 - 5.2 mmol/L Final      Chloride Chloride   Date Value Ref Range Status   03/20/2019 106 98 - 107 mmol/L Final   03/19/2019 109 (H) 98 - 107 mmol/L Final   03/18/2019 110 (H) 98 - 107 mmol/L Final      CO2 CO2   Date Value Ref Range Status   03/20/2019 23.4 22.0 - 29.0 mmol/L Final   03/19/2019 19.8 (L) 22.0 - 29.0 mmol/L Final   03/18/2019 21.1 (L) 22.0 - 29.0 mmol/L Final      BUN BUN   Date Value Ref Range Status   03/20/2019 19 8 - 23 mg/dL Final   03/19/2019 19 8 - 23 mg/dL Final   03/18/2019 28 (H) 8 - 23 mg/dL Final      Creatinine Creatinine   Date Value Ref Range Status   03/20/2019 1.03 (H) 0.57 - 1.00 mg/dL Final   03/19/2019 1.34 (H) 0.57 - 1.00 mg/dL Final   03/18/2019 1.34 (H) 0.57 - 1.00 mg/dL Final      Calcium Calcium   Date Value Ref Range Status   03/20/2019 8.3 (L) 8.6 - 10.5 mg/dL Final   03/19/2019 8.3 (L) 8.6 - 10.5  mg/dL Final   03/18/2019 7.8 (L) 8.6 - 10.5 mg/dL Final      PO4 No results found for: CAPO4   Albumin Albumin   Date Value Ref Range Status   03/20/2019 2.69 (L) 3.50 - 5.20 g/dL Final   03/19/2019 2.91 (L) 3.50 - 5.20 g/dL Final   03/18/2019 2.80 (L) 3.50 - 5.20 g/dL Final      Magnesium Magnesium   Date Value Ref Range Status   03/20/2019 1.9 1.6 - 2.4 mg/dL Final   03/19/2019 2.0 1.6 - 2.4 mg/dL Final   03/18/2019 2.3 1.6 - 2.4 mg/dL Final      Uric Acid No results found for: URICACID       Medications :       amLODIPine 10 mg Oral Q24H   atorvastatin 40 mg Oral Daily   cefdinir 300 mg Oral Q12H   cholecalciferol 50,000 Units Oral Weekly   clopidogrel 75 mg Oral Daily   doxycycline 100 mg Oral Q12H   fludrocortisone 0.1 mg Oral Daily   FLUoxetine 20 mg Oral Daily   gabapentin 600 mg Oral BID   insulin aspart 0-7 Units Subcutaneous 4x Daily AC & at Bedtime   insulin detemir 15 Units Subcutaneous Q12H   metoprolol tartrate 25 mg Oral Q12H   multivitamin 1 tablet Oral Daily   pantoprazole 40 mg Intravenous Q12H   rOPINIRole 0.5 mg Oral BID   sodium chloride 3 mL Intravenous Q12H   temazepam 30 mg Oral Nightly       Pharmacy Consult - Pharmacy to dose        Radiology Results :     Imaging Results (last 72 hours)     Procedure Component Value Units Date/Time    CT Lower Extremity Right Without Contrast [[id number]] Collected:  03/17/19 0905     Updated:  03/17/19 0909    Narrative:       EXAMINATION: CT LOWER EXTREMITY RIGHT WO CONTRAST-      CLINICAL INDICATION: Pain      TECHNIQUE: Multiple axial CT images were obtained through the right  lower extremity without IV contrast administration.  The axial CT data  set was used to generate high resolution reformatted images in the  coronal and sagittal planes to facilitate diagnostic accuracy and  treatment planning.      Radiation dose reduction techniques were utilized per ALARA protocol.  Automated exposure control was initiated through either or [facility]  or  [employer] software packages by  protocol.       1527.00843 mGy.cm     COMPARISON: None      FINDINGS: Patient has had a prior tibia and hindfoot hardware fixation.  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 Metallic streak artifact limits evaluation.  Fracture lines are still present. Some soft tissue swelling may  represent cellulitis or edema.       Impression:       Patient has had a prior tibia and hindfoot hardware  fixation. 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 Metallic streak artifact limits  evaluation. Fracture lines are still present. Some soft tissue swelling  may represent cellulitis or edema.     This report was finalized on 3/17/2019 9:07 AM by Dr. Harrison Biswas MD.       US Renal Limited [[id number]] Collected:  03/17/19 0905     Updated:  03/17/19 0907    Narrative:       EXAMINATION: US RENAL LIMITED-      CLINICAL INDICATION:     worsening kidney function; R73.9-Hyperglycemia,  unspecified; R31.29-Other microscopic hematuria     TECHNIQUE: Multiplanar gray scale sonographic imaging of the kidneys.      COMPARISON: NONE      FINDINGS: Both kidneys are normal in size and echogenicity. There is no  renal mass, stone, or hydronephrosis seen in either kidney. The right  kidney measures    [id number] cm in length; the left kidney  measures     9.22752380388 cm in length.        Impression:       Unremarkable bilateral renal ultrasound.      This report was finalized on 3/17/2019 9:05 AM by Dr. Harrison Biswas MD.       XR Chest 1 View [[id number]] Collected:  03/16/19 0851     Updated:  03/16/19 0854    Narrative:       EXAMINATION: XR CHEST 1 VW-      CLINICAL INDICATION:     fever     TECHNIQUE:  XR CHEST 1 VW-      COMPARISON: NONE      FINDINGS: Left PICC with tip in SVC.  The lungs remain aerated.  Heart and mediastinum contours are unremarkable.  No pleural  effusion.  No pneumothorax.   Bony and soft tissue structures are unremarkable.       Impression:       No radiographic evidence of acute cardiac or pulmonary  disease.     This report was finalized on 3/16/2019 8:52 AM by Dr. Harrison Biswas MD.       CT Head Without Contrast [[id number]] Collected:  03/16/19 0851     Updated:  03/16/19 0854    Narrative:          EXAMINATION: CT HEAD WO CONTRAST-      CLINICAL INDICATION:     Confusion/delirium, altered LOC, unexplained;  R73.9-Hyperglycemia, unspecified; R31.29-Other microscopic hematuria     TECHNIQUE: Contiguous axial CT images of the head were obtained without  contrast administration.      Radiation dose reduction techniques were utilized per ALARA protocol.  Automated exposure control was initiated through either or [employer] or  [employer]Right software packages by  protocol.       [id number] mGy.cm     COMPARISON:  None.       FINDINGS: Generalized cerebral atrophy is present. There is no mass  effect, midline displacement, or hydrocephalus. There are patchy areas  of decreased density within the periventricular white matter which  likely reflect chronic small vessel ischemic changes. There is no CT  evidence of acute infarct or hemorrhage. Bone windows reveal no osseous  abnormalities or fractures.        Impression:       Atrophy and chronic small vessel ischemic change, but there  are no acute intracranial abnormalities identified.         This report was finalized on 3/16/2019 8:51 AM by Dr. Harrison Biswas MD.               Assessment/Plan       1.  Acute renal failure on chronic kidney disease stage III: Patient baseline creatinine is around 1.1- went up to 1.7 now 1.3 patient has proteinuria of 4 g .  Patient is getting 24-hour urine protein and will follow-up as an outpatient on that    2.  Hyperglycemia:  Getting better     3.  Confusion: Patient had confusion at admission but now feeling better     4.  Fever:  Better , patient is currently  continued on cefepime and vancomycin    5.  Systolic congestive heart failure: EF is 45% with diastolic congestive heart failure also will use Lasix as needed    Will sign off please call if needed we will schedule the patient to see us as outpatient in 2 weeks         Discussed with patient      S Crispin Burks MD  03/20/19  6:32 AM      Electronically signed by Santiago Burks MD at 3/20/2019  8:13 AM     Mark Crawford DO at 3/19/2019  6:41 PM          Subjective     History:   Reny Elena is a 59 y.o. female admitted on 3/15/2019 secondary to <principal problem not specified>     Procedures:   3/19/19: Nuclear stress test with results pending     CC: Follow up sepsis    Patient seen and examined with GLORIA Arboleda. Awake and alert. Recently returned from stress test. Denies CP, dyspnea or palpitations. Denies HA or vision changes. Reports dysuria. Denies fever or chills. Denies nausea or vomiting. BP elevated. No acute events overnight per RN.      History taken from: patient, chart, and RN.      Objective     Vital Signs  Temp:  [97.5 °F (36.4 °C)-98.7 °F (37.1 °C)] 98 °F (36.7 °C)  Heart Rate:  [72-84] 73  Resp:  [18] 18  BP: (111-218)/() 111/62    Intake/Output Summary (Last 24 hours) at 3/19/2019 1841  Last data filed at 3/19/2019 1610  Gross per 24 hour   Intake 600 ml   Output 3690 ml   Net -3090 ml         Physical Exam:  General:    Awake, alert, in no acute distress   Heart:      Normal S1 and S2. Regular rate and rhythm. No significant murmur, rubs or gallops appreciated.   Lungs:     Respirations regular, even and unlabored. Lungs clear to auscultation B/L. No wheezes, rales or rhonchi.   Abdomen:   Soft and nontender. No guarding, rebound tenderness or  organomegaly noted. Bowel sounds present x 4.   Extremities:  No clubbing, cyanosis or edema noted. Moves UE and LE equally B/L.Small ulcer noted on right heel with mild erythema but improving.       Results Review:    Results from  last 7 days   Lab Units 03/19/19  0333 03/18/19  0055 03/17/19  1954 03/17/19  0416 03/16/19  0334 03/16/19  0053 03/15/19  1928   WBC 10*3/mm3 4.52 5.65  --  8.84 11.84* 11.21* 13.50*   HEMOGLOBIN g/dL 11.6* 10.0* 10.4* 11.0* 13.9 13.8 13.9   PLATELETS 10*3/mm3 116* 131*  --  140 147 147 205     Results from last 7 days   Lab Units 03/19/19  0333 03/18/19  0055 03/17/19 0416 03/16/19  0053 03/15/19  1928   SODIUM mmol/L 138 138 139 135* 135*   POTASSIUM mmol/L 4.9 4.0 3.8 3.8 4.1   CHLORIDE mmol/L 109* 110* 109* 98 93*   CO2 mmol/L 19.8* 21.1* 21.2* 18.1* 23.7   BUN mg/dL 19 28* 31* 19 19   CREATININE mg/dL 1.34* 1.34* 1.70* 1.14* 1.22*   CALCIUM mg/dL 8.3* 7.8* 7.9* 8.7 9.5   GLUCOSE mg/dL 248* 231* 61* 301* 494*     Results from last 7 days   Lab Units 03/19/19  0333 03/18/19 0055 03/15/19  1928   BILIRUBIN mg/dL 0.2 <0.2 0.6   ALK PHOS U/L 113 103 159*   AST (SGOT) U/L 22 18 18   ALT (SGPT) U/L 12 9 10     Results from last 7 days   Lab Units 03/19/19  0333 03/18/19  0055 03/17/19  0416 03/16/19  0053 03/15/19  1928   MAGNESIUM mg/dL 2.0 2.3 3.0* 1.8 1.9         Results from last 7 days   Lab Units 03/18/19  1249 03/18/19  0751 03/18/19  0055   TROPONIN T ng/mL 0.118* 0.146* 0.125*       Imaging Results (last 24 hours)     ** No results found for the last 24 hours. **            Medications:    amLODIPine 10 mg Oral Q24H   atorvastatin 40 mg Oral Daily   cefdinir 300 mg Oral Q12H   [START ON 3/20/2019] cholecalciferol 50,000 Units Oral Weekly   clopidogrel 75 mg Oral Daily   doxycycline 100 mg Oral Q12H   fludrocortisone 0.1 mg Oral Daily   FLUoxetine 20 mg Oral Daily   gabapentin 600 mg Oral BID   insulin aspart 0-7 Units Subcutaneous 4x Daily AC & at Bedtime   insulin detemir 15 Units Subcutaneous Q12H   metoprolol tartrate 25 mg Oral Q12H   multivitamin 1 tablet Oral Daily   pantoprazole 40 mg Intravenous Q12H   rOPINIRole 0.5 mg Oral BID   sodium chloride 3 mL Intravenous Q12H   temazepam 30 mg Oral  "Nightly       Pharmacy Consult - Pharmacy to dose            Assessment/Plan   Severe sepsis with metabolic encephalopathy: Concern for RLE cellulitis. Appears clinically improved. Fever has resolved. Leukocytosis has resolved with stable WBC today. CRP has normalized today. Cultures with NGTD. Currently on Vanc and Cefepime. Will deescalate to PO Doxycycline and Omnicef.     HORACE on CKD III with proteinuria: Renal US unremarkable. Creatine overall improved and stable today. Repeat labs in the AM. Nephrology input appreciated.     Metabolic encephalopathy: CT head negative for any acute intracranial abnormalities. Possibly 2/2 above. Now resolved.      Abnormal EKG with elevated troponin: Pt denies CP. Echo reveals an EF of 46-50%, grade I diastolic dysfunction, mild AR and mild MR. Cardiology has added Plavix. Avoiding ASA in setting of anemia and hx of bleeding peptic ulcers. Stress test completed today with results pending. Cardiology input appreciated.     Acute hypoxic respiratory failure: Likely 2/2 sepsis. No evidence of pneumonia on imaging. Improved.    DM II, insulin dependent: BG levels fluctuating today. Cont current insulin regimen and monitor.If remains elevated will adjust insulin regimen.     Prolonged QTc: Stop Lexapro as pt is on Prozac as well. Monitor electrolytes. Repeat EKG ordered for the AM.     Essential HTN: Hypotensive upon admission but has now risen with elevated BP today. Increase metoprolol. Restart home amlodipine. Add PRN IV hydralazine.     Normocytic anemia: Iron studies consistent with anemia of chronic disease. Reports of \"rui\" colored emesis on admission. Vomiting now resolved. Hx of bleeding peptic ulcers. Cont PPI.     Dyslipidemia: Cont statin.     Dysuria: Repeat UA.     DVT PPX: SCD's.      Pt is at high risk 2/2 severe sepsis with metabolic encephalopathy, HORACE on CKD III, RLE cellulitis, abnormal EKG with elevated troponin, acute hypoxic resp failure, DM with labile BG " and anemia.       Abdias NICK. DO Ty  03/19/19  6:41 PM    Electronically signed by Abdias Benson DO at 3/19/2019  6:48 PM       Consult Notes (last 24 hours) (Notes from 3/19/2019 11:58 AM through 3/20/2019 11:58 AM)     No notes of this type exist for this encounter.        Physical Therapy Notes (last 24 hours) (Notes from 3/19/2019 11:58 AM through 3/20/2019 11:58 AM)     No notes of this type exist for this encounter.        Occupational Therapy Notes (last 24 hours) (Notes from 3/19/2019 11:58 AM through 3/20/2019 11:58 AM)     No notes of this type exist for this encounter.          Discharge Summary     No notes of this type exist for this encounter.        Discharge Order (From admission, onward)    Start     Ordered    03/20/19 1117  Discharge patient  Once     Expected Discharge Date:  03/20/19    Discharge Disposition:  Home or Self Care    Physician of Record for Attribution - Please select from Treatment Team:  ABDIAS BENSON [[id number]]    Review needed by CMO to determine Physician of Record:  No       Question Answer Comment   Physician of Record for Attribution - Please select from Treatment Team ABDIAS BENSON    Review needed by CMO to determine Physician of Record No        03/20/19 1124

## 2019-03-21 ENCOUNTER — READMISSION MANAGEMENT (OUTPATIENT)
Dept: CALL CENTER | Facility: HOSPITAL | Age: 59
End: 2019-03-21

## 2019-03-21 LAB
BACTERIA SPEC AEROBE CULT: NORMAL
BACTERIA SPEC AEROBE CULT: NORMAL

## 2019-03-21 NOTE — OUTREACH NOTE
Prep Survey      Responses   Facility patient discharged from?  [facility]   Is patient eligible?  Yes   Discharge diagnosis  Severe sepsis with metabolic encephalopathy   Does the patient have one of the following disease processes/diagnoses(primary or secondary)?  Sepsis   Does the patient have Home health ordered?  Yes   What is the Home health agency?   [facility]    Is there a DME ordered?  No   Medication alerts for this patient  Lasix    Prep survey completed?  Yes          Evelyn Kemp RN

## 2019-03-21 NOTE — PAYOR COMM NOTE
"[facility]  NPI:[id number]    Utilization Review  Contact: Adele Oropeza RN  Phone: [phone]  Fax:[phone]    DISCHARGE NOTIFICATION    DISCHARGE TO HOME ON 3/20/2019 AUTH PENDING     Reny Merino (59 y.o. Female)     Date of Birth Social Security Number Address Home Phone MRN    1960  [address] [facility] [address] [phone] [id number]    [employer] Marital Status          [employer]        Admission Date Admission Type Admitting Provider Attending Provider Department, Room/Bed    3/15/19 Emergency Ricci Rubio MD  [address], 3315/2S    Discharge Date Discharge Disposition Discharge Destination        3/20/2019 Home or Self Care              Attending Provider:  (none)   Allergies:  Latex, Morphine And Related, Penicillins, Codeine, Sulfa Antibiotics    Isolation:  None   Infection:  None   Code Status:  Prior    Ht:  165.1 cm (65\")   Wt:  59 kg (130 lb 1.6 oz)    Admission Cmt:  None   Principal Problem:  None                Active Insurance as of 3/15/2019     Primary Coverage     Payor Plan Insurance Group Employer/Plan Group    [facility] MEDICAID      Payor Plan Address Payor Plan Phone Number Payor Plan Fax Number Effective Dates    [address] [address] [phone]  4/12/2017 - None Entered    [facility] [id number]       Subscriber Name Subscriber Birth Date Member ID       RENY MERINO [id number]                 Emergency Contacts      (Rel.) Home Phone Work Phone Mobile Phone    Liza Oliva (Daughter) [phone] -- --    Cliff Leiva (Significant Other) [phone] -- --          Ashely Acevedo PA-C   Physician Assistant   Medicine   Discharge Summary   Cosign Needed   Date of Service:  3/20/2019 11:06 AM   Creation Time:  3/20/2019 11:06 AM            Cosign Needed                  Show:Clear all  [x]Manual[x]Template[x]Copied    Added by:  [x]Ashely Acevedo PA-C      []Alejandra " for details           [facility] Medicine Services  DISCHARGE SUMMARY     Date of Admission: 3/15/2019     Date of Discharge:  3/20/2019     PCP: Yandel Paulson MD     Discharging Provider: Dr. Mark Crawford/ Ashely Acevedo PA-C      Admission Diagnosis:   Please see admission H&P     Discharge Diagnosis:   · Severe sepsis with metabolic encephalopathy  · Right lower extremity cellulitis  · Acute kidney injury on chronic kidney disease stage III with proteinuria  · Metabolic encephalopathy, resolved  · Abnormal EKG with elevated troponin  · Acute hypoxic respiratory failure  · Diabetes mellitus type 2, insulin-dependent  · Prolonged QTc  · Essential hypertension  · Normocytic anemia  · Iron deficiency  · History of peptic ulcers  · Dyslipidemia  · Hypokalemia  · Mild hypomagnesemia        Procedures/Testing Performed:  · Stress test 3/19/19 with reading per Dr. Crescencio Helton:  Interpretation Summary      · Myocardial perfusion imaging indicates a normal myocardial perfusion study with no evidence of ischemia.  · Normal LV cavity size. Abnormal LV wall motion consistent with mild apical hypokinesis.  · Left ventricular ejection fraction is borderline normal (Calculated EF = 52%).  · Impressions are consistent with a low risk study.      · Echocardiogram 3/18/19 with reading per Dr. Crescencio Helton:  Interpretation Summary      · Normal left ventricular cavity size and wall thickness noted.  · Left ventricular diastolic dysfunction (grade I) consistent with impaired relaxation.  · Left ventricular systolic function is mildly decreased.  · Estimated EF appears to be in the range of 46 - 50%.  · The aortic valve is abnormal in structure. The valve exhibits sclerosis. Mild aortic valve regurgitation is present. No aortic valve stenosis is present.  · The mitral valve is normal in structure. Mild mitral valve regurgitation is present. No significant mitral valve stenosis is  present.  · The tricuspid valve is normal. No evidence of tricuspid valve stenosis is present. No tricuspid valve regurgitation is present.  · There is no evidence of pericardial effusion.  · Comments: LV systolic function has decreased since 11/22/18(LVEF decreased from 56-60% then to 46-50% now)            Consults:           Consults      Date and Time Order Name Status Description     3/18/2019 1115 Inpatient Cardiology Consult Completed       3/17/2019 0752 Inpatient Nephrology Consult                    HPI      History of Present Illness:  Reny Elena is a 59 y.o. female with past medical history of closed fracture of the right fibula and tibia, arthritis, depression, diabetic ulcer of left foot, diastolic dysfunction, peripheral artery disease, iron deficiency anemia, type 2 diabetes mellitus with hyperglycemia, and hyperlipidemia.  She presented to the emergency department of [facility] on March 15 with reports of apparently nausea, vomiting, and fever.  She was also reportedly confused upon admission.  Her fever was found to be 103.3 °F, heart rate 108, lactic acid 2.6, and arterial oxygen saturation of 87.2% she was initially admitted to the observation unit secondary to reported hyperglycemia with glucose elevated at 494 in addition to underlying confusion.     Of note, prior to admission she was receiving IV Rocephin via PICC line at home which she had reportedly completed due to infected right foot ulcer.     Hospital Course      Hospital Course  Reny Elena was admitted as outlined in above HPI.  CT of her head was negative.  There was concern for right lower extremity cellulitis with possible infected left heel ulcer.  IV antibiotics with cefepime and vancomycin were started while in the observation unit.  Finally, while in the observation unit, she did receive a consultation for nephrology due to acute renal failure on chronic kidney disease stage III with creatinine rise from 1.2  on 315-1.7 on March 17, 2019.  This was felt to most likely be secondary to vascular depletion.  She did respond well to IV fluids during admission     On March 17, 2019 she was transferred to the hospital medicine service from the observation unit as it that she would require a longer hospital stay for IV antibiotics.  She did have a CT scan of the right lower extremity that did demonstrate some soft tissue swelling felt to represent cellulitis or edema.     Due to troponin T elevation of 0.105 during admission in addition to EKG with T wave inversion concerning for anterior wall ischemia cardiology consultation was placed.  She was evaluated by Dr. Helton.  An echocardiogram was performed and this did reveal a decrease in her ejection fraction. She did undergo stress test that was found to be low risk study. Due to cardiomyopathy, hydralazine 25mg TID was added in addition to Metoprolol XL 25mg daily with home Amlodipine and Metoprolol tartrate discontinued.  She did have some hypotension upon admission that did resolve while hospitalized followed by hypertension and resumed home medications that were ultimately changed to the above mentioned prior to discharge. Plavix was added during admission with aspirin held due to reported gastric ulcers. Given normal stress test, she was not discharged on Plavix as discussed with Dr. Helton by Dr. Crawford.       Lexapro was stopped while hospitalized 2/2 prolonged QTc, as she was already on Prozac as well.       She was okay for discharge from the cardiac standpoint with follow-up in 2-3 weeks.       As for her RLE cellulitis, She was de escalated to oral doxycycline and omnicef, which she tolerated well.       Discussed with AM GLORIA Mello on day of discharge.      Follow-up treatments were arranged as outlined within this document below.     Pertinent Laboratory and Radiology Results      Pertinent Test Results:             Results from last 7 days   Lab Units 03/16/19  0018    PH, ARTERIAL pH units 7.419   PO2 ART mm Hg 53.7*   PCO2, ARTERIAL mm Hg 35.6   HCO3 ART mmol/L 22.5                  Results from last 7 days   Lab Units 03/20/19  0109 03/19/19  0333 03/18/19  0055 03/17/19  1954 03/17/19  0416 03/16/19  0334 03/16/19  0053 03/15/19  1928   WBC 10*3/mm3 4.53 4.52 5.65  --  8.84 11.84* 11.21* 13.50*   HEMOGLOBIN g/dL 10.9* 11.6* 10.0* 10.4* 11.0* 13.9 13.8 13.9   HEMATOCRIT % 33.5* 35.6 31.1* 32.2* 33.8* 41.9 40.5 39.8   PLATELETS 10*3/mm3 128* 116* 131*  --  140 147 147 205   MCV fL 88.9 89.9 90.1  --  89.9 87.5 86.7 85.0   SODIUM mmol/L 141 138 138  --  139  --  135* 135*   POTASSIUM mmol/L 3.3* 4.9 4.0  --  3.8  --  3.8 4.1   CHLORIDE mmol/L 106 109* 110*  --  109*  --  98 93*   CO2 mmol/L 23.4 19.8* 21.1*  --  21.2*  --  18.1* 23.7   BUN mg/dL 19 19 28*  --  31*  --  19 19   CREATININE mg/dL 1.03* 1.34* 1.34*  --  1.70*  --  1.14* 1.22*   GLUCOSE mg/dL 208* 248* 231*  --  61*  --  301* 494*   CALCIUM mg/dL 8.3* 8.3* 7.8*  --  7.9*  --  8.7 9.5                 Results from last 7 days   Lab Units 03/18/19  1249 03/18/19  0751 03/18/19 0055 03/17/19 1954   TROPONIN T ng/mL 0.118* 0.146* 0.125* 0.105*                  Results from last 7 days   Lab Units 03/20/19  0109 03/19/19 0333 03/18/19 0055 03/17/19  0416 03/16/19  0551 03/16/19  0334 03/16/19  0053 03/15/19  1928   CRP mg/dL  --  0.44  --  1.64*  --   --  0.62*  --    LACTATE mmol/L  --   --   --   --  2.0  --  2.6*  --    WBC 10*3/mm3 4.53 4.52 5.65 8.84  --  11.84* 11.21* 13.50*              Results from last 7 days   Lab Units 03/20/19  0109 03/19/19  0333 03/18/19  0055 03/15/19  1928   BILIRUBIN mg/dL 0.2 0.2 <0.2 0.6   ALK PHOS U/L 101 113 103 159*   ALT (SGPT) U/L 8 12 9 10   AST (SGOT) U/L 13 22 18 18           Results from last 7 days   Lab Units 03/18/19  0055   CHOLESTEROL mg/dL 158   TRIGLYCERIDES mg/dL 223*   HDL CHOL mg/dL 43           Results from last 7 days   Lab Units 03/16/19  1042   HEMOGLOBIN A1C  % 10.30*                            Brief Urine Lab Results  (Last result in the past 365 days)       Color   Clarity   Blood   Leuk Est   Nitrite   Protein   CREAT   Urine HCG         03/17/19 1234                         29.2                                 Results for orders placed during the hospital encounter of 03/15/19   Adult Transthoracic Echo Complete W/ Cont if Necessary Per Protocol     Narrative · Normal left ventricular cavity size and wall thickness noted.  · Left ventricular diastolic dysfunction (grade I) consistent with   impaired relaxation.  · Left ventricular systolic function is mildly decreased.  · Estimated EF appears to be in the range of 46 - 50%.  · The aortic valve is abnormal in structure. The valve exhibits sclerosis.   Mild aortic valve regurgitation is present. No aortic valve stenosis is   present.  · The mitral valve is normal in structure. Mild mitral valve regurgitation   is present. No significant mitral valve stenosis is present.  · The tricuspid valve is normal. No evidence of tricuspid valve stenosis   is present. No tricuspid valve regurgitation is present.  · There is no evidence of pericardial effusion.  · Comments: LV systolic function has decreased since 11/22/18(LVEF   decreased from 56-60% then to 46-50% now)              Order Current Status     Blood Culture - Blood, Arm, Left Preliminary result     Blood Culture - Blood, Arm, Right Preliminary result                ----------------------------------------------------------------------------------------------------------------------  Ct Head Without Contrast     Result Date: 3/16/2019  Atrophy and chronic small vessel ischemic change, but there are no acute intracranial abnormalities identified.     This report was finalized on 3/16/2019 8:51 AM by Dr. Harrison Biswas MD.       Us Renal Limited     Result Date: 3/17/2019  Unremarkable bilateral renal ultrasound.  This report was finalized on 3/17/2019 9:05 AM by   Harrison Biswas MD.       Xr Chest 1 View     Result Date: 3/16/2019  No radiographic evidence of acute cardiac or pulmonary disease.  This report was finalized on 3/16/2019 8:52 AM by Dr. Harrison Biswas MD.       Ct Lower Extremity Right Without Contrast     Result Date: 3/17/2019  Patient has had a prior tibia and hindfoot hardware fixation. Some bony demineralization probably from disuse osteopenia predominantly noted of the hindfoot. Metallic streak artifact limits evaluation. Fracture lines are still present. Some soft tissue swelling may represent cellulitis or edema.  This report was finalized on 3/17/2019 9:07 AM by Dr. Harrison Biswas MD.                      Microbiology Results (last 10 days)      Procedure Component Value - Date/Time     Wound Culture - Wound, Leg, Right [[id number]] Collected:  03/16/19 0553     Lab Status:  Final result Specimen:  Wound from Leg, Right Updated:  03/19/19 0946       Wound Culture No growth at 3 days       Gram Stain No WBCs or organisms seen     Blood Culture - Blood, Arm, Right [[id number]] Collected:  03/16/19 0334     Lab Status:  Preliminary result Specimen:  Blood from Arm, Right Updated:  03/20/19 0501       Blood Culture No growth at 4 days     Blood Culture - Blood, Arm, Left [[id number]] Collected:  03/16/19 0334     Lab Status:  Preliminary result Specimen:  Blood from Arm, Left Updated:  03/20/19 0501       Blood Culture No growth at 4 days     Influenza Antigen, Rapid - Swab, Nasopharynx [[id number]]  (Normal) Collected:  03/15/19 1949     Lab Status:  Final result Specimen:  Swab from Nasopharynx Updated:  03/15/19 2007       Influenza A Ag, EIA Negative       Influenza B Ag, EIA Negative                Discharge Vitals and Physical Examination         Vital Signs  Temp:  [97.6 °F (36.4 °C)-98 °F (36.7 °C)] 98 °F (36.7 °C)  Heart Rate:  [61-74] 66  Resp:  [17-18] 17  BP: ()/(53-81) 116/62      Physical Exam:  General:    Awake, alert, in no acute distress    Heart:      Normal S1 and S2. Regular rate and rhythm. No significant murmur, rubs or gallops appreciated.   Lungs:     Respirations regular, even and unlabored. Lungs clear to auscultation B/L. No wheezes, rales or rhonchi.   Abdomen:   Soft and nontender. No guarding, rebound tenderness or  organomegaly noted. Bowel sounds present x 4.   Extremities:  RLE heel with healing appearing ulcer. No clubbing, cyanosis or edema noted. Moves UE and LE equally B/L.            Discharge Disposition, Discharge Medications, and Discharge Appointments      Discharge Disposition:   home     Condition on Discharge:  Fair     Discharge Medications:           Discharge Medications            New Medications      Instructions Start Date   atorvastatin 40 MG tablet  Commonly known as:  LIPITOR  Replaces:  pravastatin 20 MG tablet    40 mg, Oral, Daily    Start Date:  3/21/2019      cefdinir 300 MG capsule  Commonly known as:  OMNICEF    300 mg, Oral, Every 12 Hours Scheduled        doxycycline 100 MG capsule  Commonly known as:  MONODOX    100 mg, Oral, Every 12 Hours Scheduled        furosemide 20 MG tablet  Commonly known as:  LASIX    20 mg, Oral, Daily PRN        hydrALAZINE 25 MG tablet  Commonly known as:  APRESOLINE    25 mg, Oral, Every 8 Hours Scheduled        metoprolol succinate XL 25 MG 24 hr tablet  Commonly known as:  TOPROL-XL    25 mg, Oral, Every 24 Hours Scheduled        multivitamin tablet tablet    1 tablet, Oral, Daily    Start Date:  3/21/2019      potassium chloride 10 MEQ CR tablet  Commonly known as:  K-DUR    10 mEq, Oral, Daily PRN                      Changes to Medications      Instructions Start Date   insulin aspart 100 UNIT/ML injection  Commonly known as:  novoLOG  What changed:    · how much to take  · how to take this  · when to take this  · additional instructions    Sliding scale TID with meals. Glucose 150-199:2 units. 240-249: 3 units. 250-299- 4 units. 300-349- 5 units. 350-400 :6 units. Over  400: 7un                      Continue These Medications      Instructions Start Date   CloNIDine 0.1 MG tablet  Commonly known as:  CATAPRES    0.1 mg, Oral, Every 6 Hours PRN        fludrocortisone 0.1 MG tablet    0.1 mg, Oral, Daily        FLUoxetine 20 MG capsule  Commonly known as:  PROzac    20 mg, Oral        gabapentin 800 MG tablet  Commonly known as:  NEURONTIN    800 mg, Oral, 3 Times Daily        HYDROcodone-acetaminophen  MG per tablet  Commonly known as:  NORCO    1 tablet, Oral, Every 4 Hours PRN, Pharmacy directions states every 4 to 6 hours prn pain.        hydrOXYzine 25 MG tablet  Commonly known as:  ATARAX    25 mg, Oral, Daily PRN        Insulin Glargine 100 UNIT/ML injection pen  Commonly known as:  BASAGLAR KWIKPEN    25 Units, Subcutaneous, 2 Times Daily        pantoprazole 40 MG EC tablet  Commonly known as:  PROTONIX    40 mg, Oral, Daily        rOPINIRole 0.5 MG tablet  Commonly known as:  REQUIP    0.5 mg, Oral, 2 Times Daily, Take 1 hour before bedtime.        temazepam 30 MG capsule  Commonly known as:  RESTORIL    30 mg, Oral, Nightly        tiZANidine 4 MG tablet  Commonly known as:  ZANAFLEX    4 mg, Oral, Every 6 Hours PRN        vitamin D 36751 units capsule capsule  Commonly known as:  ERGOCALCIFEROL    50,000 Units, Oral, Weekly, Prior to [employer] Admission, Patient was on: takes on wednesdays            Stop These Medications    amLODIPine 10 MG tablet  Commonly known as:  NORVASC      atenolol 25 MG tablet  Commonly known as:  TENORMIN      escitalopram 10 MG tablet  Commonly known as:  LEXAPRO      pravastatin 20 MG tablet  Commonly known as:  PRAVACHOL  Replaced by:  atorvastatin 40 MG tablet      promethazine 25 MG tablet  Commonly known as:  PHENERGAN                Discharged medication regimen discussed with attending physician prior to discharge.      Discharge Diet:   diabetic diet          Dietary Orders (From admission, onward)     Start     Ordered     03/19/19  1614   Diet Regular; Consistent Carbohydrate  Diet Effective Now     Question Answer Comment   Diet Texture / Consistency Regular     Common Modifiers Consistent Carbohydrate         03/19/19 1615             Activity at Discharge:  activity as tolerated           Discharge Disposition:     Home or Self Care           Follow-up Appointments:       Your Scheduled Appointments         Apr 22, 2019  3:15 PM EDT  Follow Up with DARIUS Rider  [facility] CARDIOLOGY (--) [address]  [facility] [address] [phone]   Arrive 15 minutes prior to appointment.     Additional instructions:                 Pt  Has  An  Apt  With  Dr Paulson march 28  At  9  :20  Dr giles  For    April 3  At  3  Pm  In  [place]  Office  And  An  Apt  [facility]  Dr barragan  For  March 27  At  2:15                              Additional Instructions for the Follow-ups that You Need to Schedule      Discharge Follow-up with PCP   As directed         Currently Documented PCP:    Yandel Paulson MD    PCP Phone Number:    [phone]      Follow Up Details:  Follow up with Dr. Paulson in 1 week.           Discharge Follow-up with Specialty: Follow up with Dr. Helton in 4 weeks.   As directed        Specialty:  Follow up with Dr. Helton in 4 weeks.           Discharge Follow-up with Specified Provider: Follow up with Dr. Burks in 2 weeks.   As directed        To:  Follow up with Dr. Burks in 2 weeks.           Discharge Follow-up with Specified Provider: Follow up with Dr. Barragan in 1 week.   As directed        To:  Follow up with Dr. Barragan in 1 week.           Basic Metabolic Panel    Mar 25, 2019 (Approximate)        Please send results to [facility].     Order Comments:  Please send results to [facility].                         Contact information for follow-up providers           Schedule an appointment as soon as possible for a visit  with Yandel Paulson MD.    Specialty:  Family  Medicine  Why:  If symptoms worsen  Contact information:  [address] [place] [id number]  [phone]                       Schedule an appointment as soon as possible for a visit  with Yandel Paulson MD.    Specialty:  Family Medicine  Why:  If symptoms worsen  Contact information:  [address] [facility] [address]  [phone]                       Yandel Paulson MD .    Specialty:  Family Medicine  Why:  Follow up with Dr. Paulson in 1 week.  Contact information:  [address] [place] [address]  [phone]                              Contact information for after-discharge care                Home Medical Care           PROFESSIONAL [facility] .    Service:  [facility]  Contact information:  [address] [facility] [address] [phone]                                           Additional Instructions for the Follow-ups that You Need to Schedule      Discharge Follow-up with PCP   As directed         Currently Documented PCP:    Yandel Paulson MD    PCP Phone Number:    [phone]      Follow Up Details:  Follow up with Dr. Paulson in 1 week.           Discharge Follow-up with Specialty: Follow up with Dr. Helton in 4 weeks.   As directed        Specialty:  Follow up with Dr. Helton in 4 weeks.           Discharge Follow-up with Specified Provider: Follow up with Dr. Burks in 2 weeks.   As directed        To:  Follow up with Dr. Burks in 2 weeks.           Discharge Follow-up with Specified Provider: Follow up with Dr. Barragan in 1 week.   As directed        To:  Follow up with Dr. Barragan in 1 week.           Basic Metabolic Panel    Mar 25, 2019 (Approximate)        Please send results to Soha José.     Order Comments:  Please send results to Soha José.                    Test Results Pending at Discharge:       Order Current Status     Blood Culture - Blood, Arm, Left Preliminary result     Blood Culture - Blood, Arm, Right Preliminary result                 Ashely Acevedo PA-C  [facility] Medicine Team  03/20/19  4:24 PM        Time: Greater than 30 minutes spent on this discharge.

## 2019-03-22 ENCOUNTER — READMISSION MANAGEMENT (OUTPATIENT)
Dept: CALL CENTER | Facility: HOSPITAL | Age: 59
End: 2019-03-22

## 2019-03-22 NOTE — OUTREACH NOTE
Sepsis Week 1 Survey      Responses   Facility patient discharged from?  [facility]   Does the patient have one of the following disease processes/diagnoses(primary or secondary)?  Sepsis   Is there a successful TCM telephone encounter documented?  No   Week 1 attempt successful?  No   Unsuccessful attempts  Attempt 1          Dee Castañeda RN

## 2019-03-25 ENCOUNTER — OFFICE VISIT (OUTPATIENT)
Dept: CARDIOLOGY | Facility: CLINIC | Age: 59
End: 2019-03-25

## 2019-03-25 ENCOUNTER — READMISSION MANAGEMENT (OUTPATIENT)
Dept: CALL CENTER | Facility: HOSPITAL | Age: 59
End: 2019-03-25

## 2019-03-25 VITALS
RESPIRATION RATE: 16 BRPM | HEART RATE: 73 BPM | HEIGHT: 65 IN | DIASTOLIC BLOOD PRESSURE: 87 MMHG | WEIGHT: 121 LBS | SYSTOLIC BLOOD PRESSURE: 174 MMHG | BODY MASS INDEX: 20.16 KG/M2

## 2019-03-25 DIAGNOSIS — Z79.4 TYPE 2 DIABETES MELLITUS WITH HYPERGLYCEMIA, WITH LONG-TERM CURRENT USE OF INSULIN (HCC): ICD-10-CM

## 2019-03-25 DIAGNOSIS — I25.2 HISTORY OF NON-ST ELEVATION MYOCARDIAL INFARCTION (NSTEMI): Primary | ICD-10-CM

## 2019-03-25 DIAGNOSIS — E11.65 TYPE 2 DIABETES MELLITUS WITH HYPERGLYCEMIA, WITH LONG-TERM CURRENT USE OF INSULIN (HCC): ICD-10-CM

## 2019-03-25 DIAGNOSIS — N18.30 CKD (CHRONIC KIDNEY DISEASE) STAGE 3, GFR 30-59 ML/MIN (HCC): ICD-10-CM

## 2019-03-25 PROCEDURE — 99214 OFFICE O/P EST MOD 30 MIN: CPT | Performed by: INTERNAL MEDICINE

## 2019-03-25 PROCEDURE — 93000 ELECTROCARDIOGRAM COMPLETE: CPT | Performed by: INTERNAL MEDICINE

## 2019-03-25 RX ORDER — METOPROLOL SUCCINATE 50 MG/1
50 TABLET, EXTENDED RELEASE ORAL DAILY
Qty: 30 TABLET | Refills: 5 | Status: SHIPPED | OUTPATIENT
Start: 2019-03-25 | End: 2019-08-08 | Stop reason: HOSPADM

## 2019-03-25 RX ORDER — METOPROLOL SUCCINATE 50 MG/1
50 TABLET, EXTENDED RELEASE ORAL
Qty: 30 TABLET | Refills: 5 | Status: SHIPPED | OUTPATIENT
Start: 2019-03-25 | End: 2019-03-25 | Stop reason: DRUGHIGH

## 2019-03-25 RX ORDER — CLOPIDOGREL BISULFATE 75 MG/1
75 TABLET ORAL DAILY
Qty: 30 TABLET | Refills: 4 | Status: SHIPPED | OUTPATIENT
Start: 2019-03-25 | End: 2022-07-10 | Stop reason: HOSPADM

## 2019-03-25 NOTE — OUTREACH NOTE
Sepsis Week 1 Survey      Responses   Facility patient discharged from?  [facility]   Does the patient have one of the following disease processes/diagnoses(primary or secondary)?  Sepsis   Is there a successful TCM telephone encounter documented?  No   Week 1 attempt successful?  Yes   Call start time  1023   Rescheduled  Rescheduled-pt requested [leaving for MD f/u appt.]   Call end time  1023   Discharge diagnosis  Severe sepsis with metabolic encephalopathy          Melissa Grady RN

## 2019-03-25 NOTE — PROGRESS NOTES
Yandel Paulson MD  Reny Elena  1960  03/25/2019    Patient Active Problem List   Diagnosis   • Severe sepsis (CMS/[facility])   • Iron deficiency anemia   • Type 2 diabetes mellitus with hyperglycemia, with long-term current use of insulin (CMS/[facility])   • Wound of right ankle   • Hyperglycemia   • Confusion   • Cellulitis   • Metabolic encephalopathy   • History of non-ST elevation myocardial infarction (NSTEMI)   • CKD (chronic kidney disease) stage 3, GFR 30-59 ml/min (CMS/[facility])       Dear Yandel Paulson MD:    Subjective     Reny Elena is a 59 y.o. female with the problems as listed above, presents    Chief Complaint: Follow-up of recent questionable acute non-ST elevation myocardial infarction with mildly elevated troponin.     History of Present Illness: Ms. Elena is a pleasant 59-year-old  female with recent history of sepsis and had mild elevation of troponin T up to 0.146 on 3/18/2019.  She was subsequently evaluated with a Lexiscan sestamibi study that revealed no evidence of myocardial ischemia.  However EKG was abnormal with some T wave inversion across the precordial leads.  She has not had any chest pains at that time or since.  She is brought in today as a follow-up to see how she is doing and to discuss further cardiac evaluation either with cardiac catheterization or CT coronary angiography.  She has not had any chest pain or discomfort or shortness of breath since discharge from the hospital.  He overall feels pretty good since discharge from the hospital.  She says her blood pressure at home usually runs in the 130s over 60s.    Allergies   Allergen Reactions   • Latex      Added based on information entered during case entry, please review and add reactions, type, and severity as needed   • Morphine And Related Confusion   • Penicillins Other (See Comments)     Patient has received cefazolin, ceftriaxone and cefepime during past admissions.   • Codeine Rash     Pt tolerates  Norco @ home   • Sulfa Antibiotics Rash   :      Current Outpatient Medications:   •  atorvastatin (LIPITOR) 40 MG tablet, Take 1 tablet by mouth Daily., Disp: 30 tablet, Rfl: 0  •  cefdinir (OMNICEF) 300 MG capsule, Take 1 capsule by mouth Every 12 (Twelve) Hours for 10 doses., Disp: 10 capsule, Rfl: 0  •  CloNIDine (CATAPRES) 0.1 MG tablet, Take 0.1 mg by mouth Every 6 (Six) Hours As Needed for High Blood Pressure (greater than 160/90)., Disp: , Rfl:   •  doxycycline (MONODOX) 100 MG capsule, Take 1 capsule by mouth Every 12 (Twelve) Hours for 10 doses., Disp: 10 capsule, Rfl: 0  •  fludrocortisone 0.1 MG tablet, Take 0.1 mg by mouth Daily., Disp: , Rfl:   •  FLUoxetine (PROzac) 20 MG capsule, Take 20 mg by mouth., Disp: , Rfl:   •  furosemide (LASIX) 20 MG tablet, Take 1 tablet by mouth Daily As Needed (Increased shortness of breath or swelling.)., Disp: 30 tablet, Rfl: 0  •  gabapentin (NEURONTIN) 800 MG tablet, Take 800 mg by mouth 3 (Three) Times a Day., Disp: , Rfl:   •  hydrALAZINE (APRESOLINE) 25 MG tablet, Take 1 tablet by mouth Every 8 (Eight) Hours., Disp: 90 tablet, Rfl: 0  •  HYDROcodone-acetaminophen (NORCO)  MG per tablet, Take 1 tablet by mouth Every 4 (Four) Hours As Needed for Mild Pain . Pharmacy directions states every 4 to 6 hours prn pain., Disp: , Rfl:   •  hydrOXYzine (ATARAX) 25 MG tablet, Take 25 mg by mouth Daily As Needed for Anxiety., Disp: , Rfl:   •  insulin aspart (novoLOG) 100 UNIT/ML injection, Sliding scale TID with meals. Glucose 150-199:2 units. 240-249: 3 units. 250-299- 4 units. 300-349- 5 units. 350-400 :6 units. Over 400: 7un (Patient taking differently: Inject 0-6 Units under the skin into the appropriate area as directed 3 (Three) Times a Day Before Meals. Sliding scale TID with meals. Glucose 150-199:2 units. 240-249: 3 units. 250-299- 4 units. 300-349- 5 units. 350-400 :6 units. Over 400: 7un), Disp: , Rfl: 12  •  Insulin Glargine (BASAGLAR KWIKPEN) 100 UNIT/ML  injection pen, Inject 25 Units under the skin into the appropriate area as directed 2 (Two) Times a Day., Disp: 1 mL, Rfl: 12  •  multivitamin (DAILY FRAN) tablet tablet, Take 1 tablet by mouth Daily., Disp: 30 tablet, Rfl: 0  •  pantoprazole (PROTONIX) 40 MG EC tablet, Take 40 mg by mouth Daily., Disp: , Rfl:   •  potassium chloride (K-DUR) 10 MEQ CR tablet, Take 1 tablet by mouth Daily As Needed (Only take on days you take Lasix.)., Disp: 30 tablet, Rfl: 0  •  rOPINIRole (REQUIP) 0.5 MG tablet, Take 0.5 mg by mouth 2 (Two) Times a Day. Take 1 hour before bedtime., Disp: , Rfl:   •  temazepam (RESTORIL) 30 MG capsule, Take 30 mg by mouth Every Night., Disp: , Rfl:   •  tiZANidine (ZANAFLEX) 4 MG tablet, Take 4 mg by mouth Every 6 (Six) Hours As Needed for Muscle Spasms., Disp: , Rfl:   •  vitamin D (ERGOCALCIFEROL) 26977 units capsule capsule, Take 50,000 Units by mouth 1 (One) Time Per Week. Prior to [facility] Admission, Patient was on: takes on wednesdays, Disp: , Rfl:   •  clopidogrel (PLAVIX) 75 MG tablet, Take 1 tablet by mouth Daily., Disp: 30 tablet, Rfl: 4  •  metoprolol succinate XL (TOPROL-XL) 50 MG 24 hr tablet, Take 1 tablet by mouth Daily., Disp: 30 tablet, Rfl: 5      The following portions of the patient's history were reviewed and updated as appropriate: allergies, current medications, past family history, past medical history, past social history, past surgical history and problem list.    Social History     Tobacco Use   • Smoking status: Never Smoker   • Smokeless tobacco: Never Used   Substance Use Topics   • Alcohol use: No   • Drug use: No       Review of Systems   Constitution: Negative for chills and fever.   HENT: Negative for nosebleeds and sore throat.    Cardiovascular: Negative for chest pain, leg swelling and palpitations.   Respiratory: Negative for cough, hemoptysis, shortness of breath and wheezing.    Gastrointestinal: Negative for abdominal pain, hematemesis, hematochezia, melena,  "nausea and vomiting.   Genitourinary: Negative for dysuria and hematuria.   Neurological: Negative for headaches.       Objective   Vitals:    03/25/19 1113   BP: 174/87   Pulse: 73   Resp: 16   Weight: 54.9 kg (121 lb)   Height: 165.1 cm (65\")     Body mass index is 20.14 kg/m².        Physical Exam   Constitutional: She is oriented to person, place, and time. She appears well-developed and well-nourished.   HENT:   Head: Normocephalic.   Eyes: Conjunctivae and EOM are normal.   Neck: Normal range of motion. Neck supple. No JVD present. No tracheal deviation present. No thyromegaly present.   Cardiovascular: Normal rate and regular rhythm. Exam reveals no gallop and no friction rub.   No murmur heard.  Pulmonary/Chest: Breath sounds normal. No respiratory distress. She has no wheezes. She has no rales.   Abdominal: Soft. Bowel sounds are normal. She exhibits no mass. There is no tenderness.   Musculoskeletal: She exhibits no edema.   Scarring on her right shin from old surgery.   Neurological: She is alert and oriented to person, place, and time. No cranial nerve deficit.   Skin: Skin is warm and dry.   Psychiatric: She has a normal mood and affect.       Lab Results   Component Value Date     03/20/2019    K 3.3 (L) 03/20/2019     03/20/2019    CO2 23.4 03/20/2019    BUN 19 03/20/2019    CREATININE 1.03 (H) 03/20/2019    GLUCOSE 208 (H) 03/20/2019    CALCIUM 8.3 (L) 03/20/2019    AST 13 03/20/2019    ALT 8 03/20/2019    ALKPHOS 101 03/20/2019     Lab Results   Component Value Date    CKTOTAL 181 (H) 11/22/2018     Lab Results   Component Value Date    WBC 4.53 03/20/2019    HGB 10.9 (L) 03/20/2019    HCT 33.5 (L) 03/20/2019     (L) 03/20/2019     Lab Results   Component Value Date    INR 1.12 (H) 12/26/2018    INR 0.91 04/12/2017     Lab Results   Component Value Date    MG 1.9 03/20/2019     Lab Results   Component Value Date    TSH 1.413 12/29/2018    TRIG 223 (H) 03/18/2019    HDL 43 " 03/18/2019    LDL 70 03/18/2019      Lab Results   Component Value Date    .0 (H) 11/21/2018     Component      Latest Ref Rng & Units 3/17/2019 3/18/2019 3/18/2019 3/18/2019           12:55 AM  7:51 AM 12:49 PM   Troponin T      0.000 - 0.030 ng/mL 0.105 (HH) 0.125 (HH) 0.146 (HH) 0.118 (HH)       Procedures    Assessment/Plan :   Diagnosis Plan   1. History of non-ST elevation myocardial infarction (NSTEMI)  CT Angiogram Coronary   2. Type 2 diabetes mellitus with hyperglycemia, with long-term current use of insulin     3. CKD (chronic kidney disease) stage 3, GFR 30-59 ml/min          Recommendations:  1. Since patient is not on antiplatelet agent, we will go ahead and restart the Plavix 75 mg daily.  2. Continue with atorvastatin.  3. Increase Toprol-XL to 50 mg daily.  4. I have discussed with her about the option of further cardiac evaluation either with a CT coronary angiography or cardiac catheterization.  Since her myocardial perfusion study was normal and with elevated troponin recently with EKG changes, will go ahead and evaluate further with a CT coronary angiography to rule out any underlying significant coronary artery disease and then plan further management accordingly.    Return in about 2 weeks (around 4/8/2019).    As always, Yandel Paulson MD  I appreciate very much the opportunity to participate in the cardiovascular care of your patients. Please do not hesitate to call me with any questions with regards to Reny Elena's evaluation and management.       With Best Regards,        Crescencio Helton MD, [facility]    Dragon disclaimer:  Much of this encounter note is an electronic transcription/translation of spoken language to printed text. The electronic translation of spoken language may permit erroneous, or at times, nonsensical words or phrases to be inadvertently transcribed; Although I have reviewed the note for such errors, some may still exist.

## 2019-03-26 ENCOUNTER — READMISSION MANAGEMENT (OUTPATIENT)
Dept: CALL CENTER | Facility: HOSPITAL | Age: 59
End: 2019-03-26

## 2019-03-26 NOTE — OUTREACH NOTE
Sepsis Week 1 Survey      Responses   Facility patient discharged from?  [facility]   Does the patient have one of the following disease processes/diagnoses(primary or secondary)?  Sepsis   Is there a successful TCM telephone encounter documented?  No   Week 1 attempt successful?  No   Unsuccessful attempts  Attempt 2   Rescheduled  Revoked   Revoke  Decline to participate          Lauren Rahman, RN

## 2019-04-06 ENCOUNTER — APPOINTMENT (OUTPATIENT)
Dept: GENERAL RADIOLOGY | Facility: HOSPITAL | Age: 59
End: 2019-04-06

## 2019-04-06 ENCOUNTER — APPOINTMENT (OUTPATIENT)
Dept: CT IMAGING | Facility: HOSPITAL | Age: 59
End: 2019-04-06

## 2019-04-06 ENCOUNTER — HOSPITAL ENCOUNTER (EMERGENCY)
Facility: HOSPITAL | Age: 59
Discharge: HOME | End: 2019-04-06
Attending: EMERGENCY MEDICINE | Admitting: EMERGENCY MEDICINE

## 2019-04-06 ENCOUNTER — APPOINTMENT (OUTPATIENT)
Dept: ULTRASOUND IMAGING | Facility: HOSPITAL | Age: 59
End: 2019-04-06

## 2019-04-06 VITALS
RESPIRATION RATE: 18 BRPM | DIASTOLIC BLOOD PRESSURE: 61 MMHG | WEIGHT: 119 LBS | SYSTOLIC BLOOD PRESSURE: 111 MMHG | HEIGHT: 65 IN | HEART RATE: 66 BPM | BODY MASS INDEX: 19.83 KG/M2 | OXYGEN SATURATION: 98 % | TEMPERATURE: 98.2 F

## 2019-04-06 DIAGNOSIS — L03.115 CELLULITIS OF RIGHT LOWER LEG: Primary | ICD-10-CM

## 2019-04-06 LAB
ALBUMIN SERPL-MCNC: 3.1 G/DL (ref 3.5–5.2)
ALBUMIN/GLOB SERPL: 0.8 G/DL
ALP SERPL-CCNC: 156 U/L (ref 39–117)
ALT SERPL W P-5'-P-CCNC: 17 U/L (ref 1–33)
ANION GAP SERPL CALCULATED.3IONS-SCNC: 12.3 MMOL/L
AST SERPL-CCNC: 21 U/L (ref 1–32)
BASOPHILS # BLD AUTO: 0.05 10*3/MM3 (ref 0–0.2)
BASOPHILS NFR BLD AUTO: 0.6 % (ref 0–1.5)
BILIRUB SERPL-MCNC: 0.5 MG/DL (ref 0.2–1.2)
BUN BLD-MCNC: 26 MG/DL (ref 8–23)
BUN/CREAT SERPL: 19.7 (ref 7–25)
CALCIUM SPEC-SCNC: 8.7 MG/DL (ref 8.6–10.5)
CHLORIDE SERPL-SCNC: 97 MMOL/L (ref 98–107)
CO2 SERPL-SCNC: 25.7 MMOL/L (ref 22–29)
CREAT BLD-MCNC: 1.32 MG/DL (ref 0.57–1)
CRP SERPL-MCNC: 8.42 MG/DL (ref 0–0.5)
DEPRECATED RDW RBC AUTO: 39.5 FL (ref 37–54)
EOSINOPHIL # BLD AUTO: 0.3 10*3/MM3 (ref 0–0.4)
EOSINOPHIL NFR BLD AUTO: 3.7 % (ref 0.3–6.2)
ERYTHROCYTE [DISTWIDTH] IN BLOOD BY AUTOMATED COUNT: 12.9 % (ref 12.3–15.4)
ERYTHROCYTE [SEDIMENTATION RATE] IN BLOOD: >100 MM/HR (ref 0–30)
GFR SERPL CREATININE-BSD FRML MDRD: 41 ML/MIN/1.73
GLOBULIN UR ELPH-MCNC: 3.7 GM/DL
GLUCOSE BLD-MCNC: 199 MG/DL (ref 65–99)
HCT VFR BLD AUTO: 32.6 % (ref 34–46.6)
HGB BLD-MCNC: 10.9 G/DL (ref 12–15.9)
IMM GRANULOCYTES # BLD AUTO: 0.01 10*3/MM3 (ref 0–0.05)
IMM GRANULOCYTES NFR BLD AUTO: 0.1 % (ref 0–0.5)
LYMPHOCYTES # BLD AUTO: 1.49 10*3/MM3 (ref 0.7–3.1)
LYMPHOCYTES NFR BLD AUTO: 18.4 % (ref 19.6–45.3)
MCH RBC QN AUTO: 29.2 PG (ref 26.6–33)
MCHC RBC AUTO-ENTMCNC: 33.4 G/DL (ref 31.5–35.7)
MCV RBC AUTO: 87.4 FL (ref 79–97)
MONOCYTES # BLD AUTO: 0.62 10*3/MM3 (ref 0.1–0.9)
MONOCYTES NFR BLD AUTO: 7.6 % (ref 5–12)
NEUTROPHILS # BLD AUTO: 5.64 10*3/MM3 (ref 1.4–7)
NEUTROPHILS NFR BLD AUTO: 69.6 % (ref 42.7–76)
PLATELET # BLD AUTO: 207 10*3/MM3 (ref 140–450)
PMV BLD AUTO: 10.2 FL (ref 6–12)
POTASSIUM BLD-SCNC: 4.6 MMOL/L (ref 3.5–5.2)
PROT SERPL-MCNC: 6.8 G/DL (ref 6–8.5)
RBC # BLD AUTO: 3.73 10*6/MM3 (ref 3.77–5.28)
SODIUM BLD-SCNC: 135 MMOL/L (ref 136–145)
WBC NRBC COR # BLD: 8.11 10*3/MM3 (ref 3.4–10.8)

## 2019-04-06 PROCEDURE — 87040 BLOOD CULTURE FOR BACTERIA: CPT | Performed by: PHYSICIAN ASSISTANT

## 2019-04-06 PROCEDURE — 96365 THER/PROPH/DIAG IV INF INIT: CPT

## 2019-04-06 PROCEDURE — 96375 TX/PRO/DX INJ NEW DRUG ADDON: CPT

## 2019-04-06 PROCEDURE — 93971 EXTREMITY STUDY: CPT | Performed by: RADIOLOGY

## 2019-04-06 PROCEDURE — 73700 CT LOWER EXTREMITY W/O DYE: CPT | Performed by: RADIOLOGY

## 2019-04-06 PROCEDURE — 93971 EXTREMITY STUDY: CPT

## 2019-04-06 PROCEDURE — 73590 X-RAY EXAM OF LOWER LEG: CPT

## 2019-04-06 PROCEDURE — 73700 CT LOWER EXTREMITY W/O DYE: CPT

## 2019-04-06 PROCEDURE — 85025 COMPLETE CBC W/AUTO DIFF WBC: CPT | Performed by: PHYSICIAN ASSISTANT

## 2019-04-06 PROCEDURE — 85652 RBC SED RATE AUTOMATED: CPT | Performed by: PHYSICIAN ASSISTANT

## 2019-04-06 PROCEDURE — 80053 COMPREHEN METABOLIC PANEL: CPT | Performed by: PHYSICIAN ASSISTANT

## 2019-04-06 PROCEDURE — 73590 X-RAY EXAM OF LOWER LEG: CPT | Performed by: RADIOLOGY

## 2019-04-06 PROCEDURE — 86140 C-REACTIVE PROTEIN: CPT | Performed by: PHYSICIAN ASSISTANT

## 2019-04-06 PROCEDURE — 99284 EMERGENCY DEPT VISIT MOD MDM: CPT

## 2019-04-06 PROCEDURE — 25010000002 HYDROMORPHONE PER 4 MG: Performed by: EMERGENCY MEDICINE

## 2019-04-06 RX ORDER — CLINDAMYCIN PHOSPHATE 600 MG/50ML
600 INJECTION INTRAVENOUS ONCE
Status: COMPLETED | OUTPATIENT
Start: 2019-04-06 | End: 2019-04-06

## 2019-04-06 RX ORDER — CLINDAMYCIN HYDROCHLORIDE 300 MG/1
300 CAPSULE ORAL 4 TIMES DAILY
Qty: 40 CAPSULE | Refills: 0 | Status: SHIPPED | OUTPATIENT
Start: 2019-04-06 | End: 2019-04-16

## 2019-04-06 RX ORDER — SODIUM CHLORIDE 0.9 % (FLUSH) 0.9 %
10 SYRINGE (ML) INJECTION AS NEEDED
Status: DISCONTINUED | OUTPATIENT
Start: 2019-04-06 | End: 2019-04-06 | Stop reason: HOSPADM

## 2019-04-06 RX ORDER — HYDROMORPHONE HYDROCHLORIDE 1 MG/ML
0.5 INJECTION, SOLUTION INTRAMUSCULAR; INTRAVENOUS; SUBCUTANEOUS ONCE
Status: COMPLETED | OUTPATIENT
Start: 2019-04-06 | End: 2019-04-06

## 2019-04-06 RX ADMIN — CLINDAMYCIN PHOSPHATE 600 MG: 600 INJECTION, SOLUTION INTRAVENOUS at 17:41

## 2019-04-06 RX ADMIN — HYDROMORPHONE HYDROCHLORIDE 0.5 MG: 1 INJECTION, SOLUTION INTRAMUSCULAR; INTRAVENOUS; SUBCUTANEOUS at 15:23

## 2019-04-06 NOTE — ED PROVIDER NOTES
Subjective   59-year-old female presents to the ER with complaint of right lower leg swelling that began yesterday.  Patient states that she is also noticed some erythema to this area.  Past medical history significant for tib-fib fracture back in August 2018, she has had several corrective surgeries on this, she does have plates and screws in place.  Last surgery was approximately 4 months ago.  She denies any new injury to this area.  She states she walks about 4-5 steps on this leg per day.  She denies any fever or chills. She denies any chest pain or shortness of breath        History provided by:  Patient   used: No    Lower Extremity Issue   Location:  Leg  Time since incident:  1 day  Injury: no    Leg location:  R lower leg  Pain details:     Quality:  Aching and dull    Radiates to:  Does not radiate    Severity:  Moderate    Onset quality:  Sudden    Timing:  Constant    Progression:  Unchanged  Chronicity:  New  Dislocation: no    Foreign body present:  No foreign bodies  Tetanus status:  Up to date  Prior injury to area:  No  Relieved by:  None tried  Worsened by:  Nothing  Ineffective treatments:  None tried  Associated symptoms: swelling    Associated symptoms: no decreased ROM, no fever, no neck pain, no numbness, no stiffness and no tingling    Risk factors: no concern for non-accidental trauma and no frequent fractures        Review of Systems   Constitutional: Negative for activity change and fever.   HENT: Negative for congestion, rhinorrhea and sore throat.    Eyes: Negative for pain and redness.   Respiratory: Negative for cough, shortness of breath and wheezing.    Cardiovascular: Negative for chest pain.   Gastrointestinal: Negative for abdominal distention, diarrhea, nausea and vomiting.   Endocrine: Negative for polyphagia and polyuria.   Genitourinary: Negative for decreased urine volume, difficulty urinating and dysuria.   Musculoskeletal: Negative for arthralgias,  myalgias, neck pain and stiffness.   Skin: Negative for rash and wound.   Allergic/Immunologic: Negative for food allergies and immunocompromised state.   Neurological: Negative for dizziness and headaches.   Hematological: Negative for adenopathy. Does not bruise/bleed easily.   Psychiatric/Behavioral: Negative for agitation and confusion.   All other systems reviewed and are negative.      Past Medical History:   Diagnosis Date   • Arthritis    • Closed fracture of right fibula and tibia 2018   • Depression    • Diabetic ulcer of left foot associated with type 2 diabetes mellitus (CMS/[facility]) 2017   • Diastolic dysfunction    • Essential hypertension    • Hyperlipidemia    • Iron deficiency anemia 2018   • PAD (peripheral artery disease) (CMS/[facility])    • Type 2 diabetes mellitus with hyperglycemia, with long-term current use of insulin (CMS/[facility]) 2018       Allergies   Allergen Reactions   • Latex      Added based on information entered during case entry, please review and add reactions, type, and severity as needed   • Morphine And Related Confusion   • Penicillins Other (See Comments)     Patient has received cefazolin, ceftriaxone and cefepime during past admissions.   • Codeine Rash     Pt tolerates Norco @ home   • Sulfa Antibiotics Rash       Past Surgical History:   Procedure Laterality Date   • BACK SURGERY      Post spinal diskectomy, osteophytectomy in one lumbar interspace   • CATARACT EXTRACTION      Left    •  SECTION     • DENTAL PROCEDURE     • HYSTERECTOMY     • INCISION AND DRAINAGE LEG Left 4/15/2017    Procedure: INCISION AND DRAINAGE LOWER EXTREMITY;  Surgeon: Basilio Morris MD;  Location: [facility] OR;  Service:    • INCISION AND DRAINAGE LEG Left 2017    Procedure: Irrigation and Debridment abcess diabetic wound left foot with deep culture;  Surgeon: Basilio Morris MD;  Location: [facility] OR;  Service:    • KNEE ARTHROSCOPY Right    • ORIF TIBIA FRACTURE Right  12/28/2018    Procedure: TIBIAL  FRACTURE OPEN REDUCTION INTERNAL FIXATION;  Surgeon: Jung Barragan MD;  Location: [facility];  Service: Orthopedics   • TOE AMPUTATION Right     5th   • TUBAL ABDOMINAL LIGATION         Family History   Problem Relation Age of Onset   • Heart disease Mother    • Cancer Mother    • COPD Mother    • Diabetes Other    • Depression Other        Social History     Socioeconomic History   • Marital status:      Spouse name: Not on file   • Number of children: Not on file   • Years of education: Not on file   • Highest education level: Not on file   Tobacco Use   • Smoking status: Never Smoker   • Smokeless tobacco: Never Used   Substance and Sexual Activity   • Alcohol use: No   • Drug use: No   • Sexual activity: Defer           Objective   Physical Exam   Constitutional: She is oriented to person, place, and time. She appears well-developed and well-nourished.   HENT:   Head: Normocephalic and atraumatic.   Eyes: EOM are normal. Pupils are equal, round, and reactive to light.   Neck: Normal range of motion. Neck supple.   Cardiovascular: Normal rate, regular rhythm and normal heart sounds.   Pulmonary/Chest: Effort normal and breath sounds normal.   Abdominal: Soft. Bowel sounds are normal.   Musculoskeletal: Normal range of motion.   Neurological: She is alert and oriented to person, place, and time.   Skin: Skin is warm and dry.        Psychiatric: She has a normal mood and affect. Her behavior is normal. Judgment and thought content normal.   Nursing note and vitals reviewed.      Procedures           ED Course  ED Course as of Apr 06 1959   Sat Apr 06, 2019   1658 Discussed patient with Dr. Andrade.  Advises to give patient a dose of IV antibiotics here, then will discharge patient home on antibiotics and follow-up with Dr. Barragan in the office on Tuesday morning.  Advises to have patient elevate her leg as well.  [KATHIA]      ED Course User Index  [KATHIA] Chivo Staley PA                   MDM  Number of Diagnoses or Management Options  Cellulitis of right lower leg:      Amount and/or Complexity of Data Reviewed  Clinical lab tests: reviewed and ordered  Tests in the radiology section of CPT®: ordered and reviewed  Tests in the medicine section of CPT®: ordered and reviewed  Decide to obtain previous medical records or to obtain history from someone other than the patient: yes  Independent visualization of images, tracings, or specimens: yes    Patient Progress  Patient progress: stable        Final diagnoses:   Cellulitis of right lower leg            Chivo Staley PA  04/06/19 1959

## 2019-04-09 ENCOUNTER — TRANSCRIBE ORDERS (OUTPATIENT)
Dept: INFUSION THERAPY | Facility: HOSPITAL | Age: 59
End: 2019-04-09

## 2019-04-09 DIAGNOSIS — M86.9 OSTEOMYELITIS OF RIGHT LOWER EXTREMITY (HCC): Primary | ICD-10-CM

## 2019-04-10 ENCOUNTER — HOSPITAL ENCOUNTER (OUTPATIENT)
Dept: INFUSION THERAPY | Facility: HOSPITAL | Age: 59
Discharge: HOME | End: 2019-04-10
Admitting: ORTHOPAEDIC SURGERY

## 2019-04-10 VITALS
DIASTOLIC BLOOD PRESSURE: 96 MMHG | HEART RATE: 78 BPM | TEMPERATURE: 98.1 F | SYSTOLIC BLOOD PRESSURE: 200 MMHG | RESPIRATION RATE: 20 BRPM

## 2019-04-10 DIAGNOSIS — M86.9 OSTEOMYELITIS OF RIGHT LOWER EXTREMITY (HCC): ICD-10-CM

## 2019-04-10 PROCEDURE — G0463 HOSPITAL OUTPT CLINIC VISIT: HCPCS

## 2019-04-10 NOTE — PROGRESS NOTES
"Dr. Baca notified of pt's B/P of 200/95.  Notified him, pt did not take b/p medication or did not bring meds  with her.  Stated \"unable to order any  Medication for b/p to call primary MD.\"  Pt not register to primary physician.  Advised pt to go to ER for control of B/p.  Pt stated she wanted  to go home and take own medication and reschedule for tomorrow.  Pt rescheduled for 1:00 o'clock tomorrow 4-11-19.  Reinforced to pt  if b/p does not come down, to go to ER.  Pt verbalized understanding.    "

## 2019-04-11 ENCOUNTER — HOSPITAL ENCOUNTER (OUTPATIENT)
Dept: INFUSION THERAPY | Facility: HOSPITAL | Age: 59
Discharge: HOME | End: 2019-04-11
Admitting: ORTHOPAEDIC SURGERY

## 2019-04-11 VITALS
RESPIRATION RATE: 16 BRPM | TEMPERATURE: 97.7 F | DIASTOLIC BLOOD PRESSURE: 85 MMHG | SYSTOLIC BLOOD PRESSURE: 171 MMHG | HEART RATE: 65 BPM

## 2019-04-11 DIAGNOSIS — S91.001A WOUND OF RIGHT ANKLE, INITIAL ENCOUNTER: Primary | ICD-10-CM

## 2019-04-11 LAB
BACTERIA SPEC AEROBE CULT: NORMAL
BACTERIA SPEC AEROBE CULT: NORMAL

## 2019-04-11 PROCEDURE — C1751 CATH, INF, PER/CENT/MIDLINE: HCPCS

## 2019-04-11 PROCEDURE — 96365 THER/PROPH/DIAG IV INF INIT: CPT

## 2019-04-11 PROCEDURE — 25010000002 CEFTRIAXONE: Performed by: ORTHOPAEDIC SURGERY

## 2019-04-11 RX ADMIN — CEFTRIAXONE 2 G: 2 INJECTION, POWDER, FOR SOLUTION INTRAMUSCULAR; INTRAVENOUS at 14:51

## 2019-04-11 NOTE — PATIENT INSTRUCTIONS
"PICC Insertion, Care After  This sheet gives you information about how to care for yourself after your procedure. Your health care provider may also give you more specific instructions. If you have problems or questions, contact your health care provider.  What can I expect after the procedure?  After your procedure, it is common to have mild discomfort at the insertion site.  Follow these instructions at home:  Activity  · Rest as told by your health care provider. Ask your health care provider when you can return to your normal activities.  · If your PICC is near or at the bend of your elbow, avoid activity with repeated motion at the elbow.  · Do not lift anything that is heavier than 10 lb (4.5 kg), or the limit that you are told, until your health care provider says that it is safe.  · Avoid using a crutch with the arm on the same side as your PICC. You may need to use a walker.  · Avoid any activity that requires great effort as told by your health care provider.  Bathing  · Do not take baths, swim, or use a hot tub until your health care provider approves. Ask your health care provider if you can take showers. You may only be allowed to take sponge baths for bathing.  · Keep the bandage (dressing) dry until your health care provider says it can be removed.  General instructions  · Do not drive for 24 hours if you were given a medicine to help you relax (sedative).  · Take over-the-counter and prescription medicines only as told by your health care provider.  · Keep all follow-up visits as told by your health care provider. This is important.  Contact a health care provider if:  · You have a fever or chills.  · You have more redness, swelling, or pain around the insertion site.  · You have fluid or blood coming from the insertion site.  · Your insertion site feels warm to the touch.  · You have pus or a bad smell coming from the insertion site.  · Your vein feels hard and raised like a \"cord\" under the skin " "around the insertion site.  Get help right away if:  · You have swelling in the arm in which the PICC was inserted.  · You have numbness or tingling in your fingers, hand, or arm.  · You have a red streak going up your arm from where the PICC was inserted.  · Your PICC cannot be flushed, is hard to flush, or leaks around the insertion site when flushed.  · You have pain in your arm, ear, face, or teeth.  · Your PICC is accidentally pulled all the way out. If this happens, cover the insertion site with a bandage or gauze dressing. Do not throw the PICC away. Your health care provider will need to check it.  · Your PICC was tugged or pulled and has partially come out. Do not push the PICC back in.  · You hear a \"flushing\" sound when the PICC is flushed.  · You feel your heart racing or skipping beats.  · There is a hole or tear in the PICC.  · You have pain in your chest.  · You have shortness of breath or difficulty breathing.  Summary  · After your procedure, it is common to have mild discomfort at the insertion site.  · Do not lift anything that is heavier than 10 lb (4.5 kg), or the limit that you are told, until your health care provider says that it is safe.  · Flush the PICC as told by your health care provider. Let your health care provider know right away if the PICC is hard to flush or does not flush. Do not use force to flush the PICC.  · Check your insertion site every day for signs of infection.  This information is not intended to replace advice given to you by your health care provider. Make sure you discuss any questions you have with your health care provider.  Document Released: 10/08/2014 Document Revised: 02/12/2018 Document Reviewed: 02/12/2018  [employer] Patient Education © 2019 [employer]  Ceftriaxone injection  What is this medicine?  CEFTRIAXONE (sef try AX one) is a cephalosporin antibiotic. It is used to treat certain kinds of bacterial infections. It will not work for colds, " flu, or other viral infections.  This medicine may be used for other purposes; ask your health care provider or pharmacist if you have questions.  COMMON BRAND NAME(S): Ceftrisol Plus, Rocephin  What should I tell my health care provider before I take this medicine?  They need to know if you have any of these conditions:  -any chronic illness  -bowel disease, like colitis  -both kidney and liver disease  -high bilirubin level in  patients  -an unusual or allergic reaction to ceftriaxone, other cephalosporin or penicillin antibiotics, foods, dyes, or preservatives  -pregnant or trying to get pregnant  -breast-feeding  How should I use this medicine?  This medicine is injected into a muscle or infused it into a vein. It is usually given in a medical office or clinic. If you are to give this medicine you will be taught how to inject it. Follow instructions carefully. Use your doses at regular intervals. Do not take your medicine more often than directed. Do not skip doses or stop your medicine early even if you feel better. Do not stop taking except on your doctor's advice.  Talk to your pediatrician regarding the use of this medicine in children. Special care may be needed.  Overdosage: If you think you have taken too much of this medicine contact a poison control center or emergency room at once.  NOTE: This medicine is only for you. Do not share this medicine with others.  What if I miss a dose?  If you miss a dose, take it as soon as you can. If it is almost time for your next dose, take only that dose. Do not take double or extra doses.  What may interact with this medicine?  Do not take this medicine with any of the following medications:  -intravenous calcium  This medicine may also interact with the following medications:  -birth control pills  This list may not describe all possible interactions. Give your health care provider a list of all the medicines, herbs, non-prescription drugs, or dietary  supplements you use. Also tell them if you smoke, drink alcohol, or use illegal drugs. Some items may interact with your medicine.  What should I watch for while using this medicine?  Tell your doctor or health care professional if your symptoms do not improve or if they get worse.  Do not treat diarrhea with over the counter products. Contact your doctor if you have diarrhea that lasts more than 2 days or if it is severe and watery.  If you are being treated for a sexually transmitted disease, avoid sexual contact until you have finished your treatment. Having sex can infect your sexual partner.  Calcium may bind to this medicine and cause lung or kidney problems. Avoid calcium products while taking this medicine and for 48 hours after taking the last dose of this medicine.  What side effects may I notice from receiving this medicine?  Side effects that you should report to your doctor or health care professional as soon as possible:  -allergic reactions like skin rash, itching or hives, swelling of the face, lips, or tongue  -breathing problems  -fever, chills  -irregular heartbeat  -pain when passing urine  -seizures  -stomach pain, cramps  -unusual bleeding, bruising  -unusually weak or tired  Side effects that usually do not require medical attention (report to your doctor or health care professional if they continue or are bothersome):  -diarrhea  -dizzy, drowsy  -headache  -nausea, vomiting  -pain, swelling, irritation where injected  -stomach upset  -sweating  This list may not describe all possible side effects. Call your doctor for medical advice about side effects. You may report side effects to FDA at 8-505-FDA-7795.  Where should I keep my medicine?  Keep out of the reach of children.  Store at room temperature below 25 degrees C (77 degrees F). Protect from light. Throw away any unused vials after the expiration date.  NOTE: This sheet is a summary. It may not cover all possible information. If you  have questions about this medicine, talk to your doctor, pharmacist, or health care provider.  © 2019 [employer] (2015-07-06 09:14:54)

## 2019-04-17 ENCOUNTER — OFFICE VISIT (OUTPATIENT)
Dept: CARDIOLOGY | Facility: CLINIC | Age: 59
End: 2019-04-17

## 2019-04-17 VITALS
WEIGHT: 121 LBS | HEIGHT: 65 IN | BODY MASS INDEX: 20.16 KG/M2 | OXYGEN SATURATION: 99 % | SYSTOLIC BLOOD PRESSURE: 103 MMHG | HEART RATE: 68 BPM | DIASTOLIC BLOOD PRESSURE: 57 MMHG

## 2019-04-17 DIAGNOSIS — R77.8 ELEVATED TROPONIN: Primary | ICD-10-CM

## 2019-04-17 PROBLEM — R79.89 ELEVATED TROPONIN: Status: ACTIVE | Noted: 2019-04-17

## 2019-04-17 RX ORDER — BUSPIRONE HYDROCHLORIDE 10 MG/1
10 TABLET ORAL 3 TIMES DAILY
COMMUNITY
Start: 2019-04-10 | End: 2021-11-09 | Stop reason: HOSPADM

## 2019-07-23 ENCOUNTER — APPOINTMENT (OUTPATIENT)
Dept: CT IMAGING | Facility: HOSPITAL | Age: 59
End: 2019-07-23

## 2019-07-23 ENCOUNTER — APPOINTMENT (OUTPATIENT)
Dept: GENERAL RADIOLOGY | Facility: HOSPITAL | Age: 59
End: 2019-07-23

## 2019-07-23 ENCOUNTER — HOSPITAL ENCOUNTER (EMERGENCY)
Facility: HOSPITAL | Age: 59
Discharge: ANOTHER HOSPITAL | End: 2019-07-24
Attending: FAMILY MEDICINE | Admitting: FAMILY MEDICINE

## 2019-07-23 DIAGNOSIS — I10 HYPERTENSION, UNSPECIFIED TYPE: ICD-10-CM

## 2019-07-23 DIAGNOSIS — R41.0 DISORIENTATION: ICD-10-CM

## 2019-07-23 DIAGNOSIS — R47.01 EXPRESSIVE APHASIA: Primary | ICD-10-CM

## 2019-07-23 DIAGNOSIS — R73.9 HYPERGLYCEMIA: ICD-10-CM

## 2019-07-23 LAB
6-ACETYL MORPHINE: NEGATIVE
A-A DO2: 19 MMHG (ref 0–300)
ABO GROUP BLD: NORMAL
ALBUMIN SERPL-MCNC: 3.22 G/DL (ref 3.5–5.2)
ALBUMIN/GLOB SERPL: 0.9 G/DL
ALP SERPL-CCNC: 171 U/L (ref 39–117)
ALT SERPL W P-5'-P-CCNC: 28 U/L (ref 1–33)
AMPHET+METHAMPHET UR QL: NEGATIVE
ANION GAP SERPL CALCULATED.3IONS-SCNC: 11.2 MMOL/L (ref 5–15)
APAP SERPL-MCNC: <5 MCG/ML (ref 10–30)
APTT PPP: 26.8 SECONDS (ref 23.8–36.1)
ARTERIAL PATENCY WRIST A: ABNORMAL
AST SERPL-CCNC: 27 U/L (ref 1–32)
ATMOSPHERIC PRESS: 727 MMHG
BACTERIA UR QL AUTO: NORMAL /HPF
BARBITURATES UR QL SCN: NEGATIVE
BASE EXCESS BLDA CALC-SCNC: -0.7 MMOL/L
BASOPHILS # BLD AUTO: 0.03 10*3/MM3 (ref 0–0.2)
BASOPHILS NFR BLD AUTO: 0.5 % (ref 0–1.5)
BDY SITE: ABNORMAL
BENZODIAZ UR QL SCN: NEGATIVE
BILIRUB SERPL-MCNC: 0.3 MG/DL (ref 0.2–1.2)
BILIRUB UR QL STRIP: NEGATIVE
BLD GP AB SCN SERPL QL: NEGATIVE
BODY TEMPERATURE: 98.6 C
BUN BLD-MCNC: 24 MG/DL (ref 8–23)
BUN/CREAT SERPL: 25 (ref 7–25)
BUPRENORPHINE SERPL-MCNC: NEGATIVE NG/ML
CALCIUM SPEC-SCNC: 9.1 MG/DL (ref 8.6–10.5)
CANNABINOIDS SERPL QL: NEGATIVE
CHLORIDE SERPL-SCNC: 102 MMOL/L (ref 98–107)
CLARITY UR: CLEAR
CO2 SERPL-SCNC: 22.8 MMOL/L (ref 22–29)
COCAINE UR QL: NEGATIVE
COHGB MFR BLD: 1.4 % (ref 0–5)
COLOR UR: YELLOW
CREAT BLD-MCNC: 0.96 MG/DL (ref 0.57–1)
CRP SERPL-MCNC: 0.1 MG/DL (ref 0–0.5)
D-LACTATE SERPL-SCNC: 0.9 MMOL/L (ref 0.5–2)
DEPRECATED RDW RBC AUTO: 45.7 FL (ref 37–54)
EOSINOPHIL # BLD AUTO: 0.29 10*3/MM3 (ref 0–0.4)
EOSINOPHIL NFR BLD AUTO: 4.4 % (ref 0.3–6.2)
ERYTHROCYTE [DISTWIDTH] IN BLOOD BY AUTOMATED COUNT: 14.5 % (ref 12.3–15.4)
ERYTHROCYTE [SEDIMENTATION RATE] IN BLOOD: 63 MM/HR (ref 0–30)
ETHANOL BLD-MCNC: <10 MG/DL (ref 0–10)
ETHANOL UR QL: <0.01 %
GFR SERPL CREATININE-BSD FRML MDRD: 59 ML/MIN/1.73
GLOBULIN UR ELPH-MCNC: 3.8 GM/DL
GLUCOSE BLD-MCNC: 334 MG/DL (ref 65–99)
GLUCOSE BLDC GLUCOMTR-MCNC: 291 MG/DL (ref 70–130)
GLUCOSE UR STRIP-MCNC: ABNORMAL MG/DL
HCO3 BLDA-SCNC: 24.3 MMOL/L (ref 22–26)
HCT VFR BLD AUTO: 33.3 % (ref 34–46.6)
HCT VFR BLD CALC: 35 % (ref 37–47)
HGB BLD-MCNC: 11.2 G/DL (ref 12–15.9)
HGB BLDA-MCNC: 11.9 G/DL (ref 12–16)
HGB UR QL STRIP.AUTO: ABNORMAL
HOLD SPECIMEN: NORMAL
HOLD SPECIMEN: NORMAL
HOROWITZ INDEX BLD+IHG-RTO: 21 %
HYALINE CASTS UR QL AUTO: NORMAL /LPF
IMM GRANULOCYTES # BLD AUTO: 0.01 10*3/MM3 (ref 0–0.05)
IMM GRANULOCYTES NFR BLD AUTO: 0.2 % (ref 0–0.5)
INR PPP: 0.94 (ref 0.9–1.1)
KETONES UR QL STRIP: NEGATIVE
LEUKOCYTE ESTERASE UR QL STRIP.AUTO: NEGATIVE
LYMPHOCYTES # BLD AUTO: 0.97 10*3/MM3 (ref 0.7–3.1)
LYMPHOCYTES NFR BLD AUTO: 14.7 % (ref 19.6–45.3)
MCH RBC QN AUTO: 29.8 PG (ref 26.6–33)
MCHC RBC AUTO-ENTMCNC: 33.6 G/DL (ref 31.5–35.7)
MCV RBC AUTO: 88.6 FL (ref 79–97)
METHADONE UR QL SCN: NEGATIVE
METHGB BLD QL: 0.2 % (ref 0–3)
MODALITY: ABNORMAL
MONOCYTES # BLD AUTO: 0.38 10*3/MM3 (ref 0.1–0.9)
MONOCYTES NFR BLD AUTO: 5.7 % (ref 5–12)
NEUTROPHILS # BLD AUTO: 4.94 10*3/MM3 (ref 1.7–7)
NEUTROPHILS NFR BLD AUTO: 74.5 % (ref 42.7–76)
NITRITE UR QL STRIP: NEGATIVE
OPIATES UR QL: POSITIVE
OXYCODONE UR QL SCN: NEGATIVE
OXYHGB MFR BLDV: 93.4 % (ref 85–100)
PCO2 BLDA: 41.3 MM HG (ref 35–45)
PCP UR QL SCN: NEGATIVE
PH BLDA: 7.39 PH UNITS (ref 7.35–7.45)
PH UR STRIP.AUTO: 6.5 [PH] (ref 5–8)
PLATELET # BLD AUTO: 158 10*3/MM3 (ref 140–450)
PMV BLD AUTO: 10.2 FL (ref 6–12)
PO2 BLDA: 74.3 MM HG (ref 80–100)
POTASSIUM BLD-SCNC: 5.3 MMOL/L (ref 3.5–5.2)
PROT SERPL-MCNC: 7 G/DL (ref 6–8.5)
PROT UR QL STRIP: ABNORMAL
PROTHROMBIN TIME: 13.1 SECONDS (ref 11–15.4)
RBC # BLD AUTO: 3.76 10*6/MM3 (ref 3.77–5.28)
RBC # UR: NORMAL /HPF
REF LAB TEST METHOD: NORMAL
RH BLD: POSITIVE
SALICYLATES SERPL-MCNC: <0.3 MG/DL
SAO2 % BLDCOA: 94.9 % (ref 90–100)
SODIUM BLD-SCNC: 136 MMOL/L (ref 136–145)
SP GR UR STRIP: 1.01 (ref 1–1.03)
SQUAMOUS #/AREA URNS HPF: NORMAL /HPF
T&S EXPIRATION DATE: NORMAL
TROPONIN T SERPL-MCNC: <0.01 NG/ML (ref 0–0.03)
UROBILINOGEN UR QL STRIP: ABNORMAL
WBC NRBC COR # BLD: 6.62 10*3/MM3 (ref 3.4–10.8)
WBC UR QL AUTO: NORMAL /HPF
WHOLE BLOOD HOLD SPECIMEN: NORMAL
WHOLE BLOOD HOLD SPECIMEN: NORMAL

## 2019-07-23 PROCEDURE — 85652 RBC SED RATE AUTOMATED: CPT | Performed by: FAMILY MEDICINE

## 2019-07-23 PROCEDURE — 82746 ASSAY OF FOLIC ACID SERUM: CPT | Performed by: PSYCHIATRY & NEUROLOGY

## 2019-07-23 PROCEDURE — 81001 URINALYSIS AUTO W/SCOPE: CPT | Performed by: FAMILY MEDICINE

## 2019-07-23 PROCEDURE — 83605 ASSAY OF LACTIC ACID: CPT | Performed by: FAMILY MEDICINE

## 2019-07-23 PROCEDURE — 80307 DRUG TEST PRSMV CHEM ANLYZR: CPT | Performed by: FAMILY MEDICINE

## 2019-07-23 PROCEDURE — 86140 C-REACTIVE PROTEIN: CPT | Performed by: FAMILY MEDICINE

## 2019-07-23 PROCEDURE — 80053 COMPREHEN METABOLIC PANEL: CPT | Performed by: FAMILY MEDICINE

## 2019-07-23 PROCEDURE — 70450 CT HEAD/BRAIN W/O DYE: CPT | Performed by: RADIOLOGY

## 2019-07-23 PROCEDURE — 82805 BLOOD GASES W/O2 SATURATION: CPT | Performed by: FAMILY MEDICINE

## 2019-07-23 PROCEDURE — 85730 THROMBOPLASTIN TIME PARTIAL: CPT | Performed by: FAMILY MEDICINE

## 2019-07-23 PROCEDURE — 82375 ASSAY CARBOXYHB QUANT: CPT | Performed by: FAMILY MEDICINE

## 2019-07-23 PROCEDURE — 82607 VITAMIN B-12: CPT | Performed by: PSYCHIATRY & NEUROLOGY

## 2019-07-23 PROCEDURE — 86901 BLOOD TYPING SEROLOGIC RH(D): CPT | Performed by: FAMILY MEDICINE

## 2019-07-23 PROCEDURE — 99285 EMERGENCY DEPT VISIT HI MDM: CPT

## 2019-07-23 PROCEDURE — 83050 HGB METHEMOGLOBIN QUAN: CPT | Performed by: FAMILY MEDICINE

## 2019-07-23 PROCEDURE — 82962 GLUCOSE BLOOD TEST: CPT

## 2019-07-23 PROCEDURE — 85610 PROTHROMBIN TIME: CPT | Performed by: FAMILY MEDICINE

## 2019-07-23 PROCEDURE — 51702 INSERT TEMP BLADDER CATH: CPT

## 2019-07-23 PROCEDURE — 71045 X-RAY EXAM CHEST 1 VIEW: CPT | Performed by: RADIOLOGY

## 2019-07-23 PROCEDURE — 36600 WITHDRAWAL OF ARTERIAL BLOOD: CPT | Performed by: FAMILY MEDICINE

## 2019-07-23 PROCEDURE — 85025 COMPLETE CBC W/AUTO DIFF WBC: CPT | Performed by: FAMILY MEDICINE

## 2019-07-23 PROCEDURE — 84484 ASSAY OF TROPONIN QUANT: CPT | Performed by: FAMILY MEDICINE

## 2019-07-23 PROCEDURE — 93010 ELECTROCARDIOGRAM REPORT: CPT | Performed by: INTERNAL MEDICINE

## 2019-07-23 PROCEDURE — 87040 BLOOD CULTURE FOR BACTERIA: CPT | Performed by: FAMILY MEDICINE

## 2019-07-23 PROCEDURE — 86850 RBC ANTIBODY SCREEN: CPT | Performed by: FAMILY MEDICINE

## 2019-07-23 PROCEDURE — 86900 BLOOD TYPING SEROLOGIC ABO: CPT | Performed by: FAMILY MEDICINE

## 2019-07-23 PROCEDURE — 71045 X-RAY EXAM CHEST 1 VIEW: CPT

## 2019-07-23 PROCEDURE — 70450 CT HEAD/BRAIN W/O DYE: CPT

## 2019-07-23 PROCEDURE — 93005 ELECTROCARDIOGRAM TRACING: CPT | Performed by: FAMILY MEDICINE

## 2019-07-23 RX ORDER — ASPIRIN 81 MG/1
324 TABLET, CHEWABLE ORAL ONCE
Status: COMPLETED | OUTPATIENT
Start: 2019-07-23 | End: 2019-07-23

## 2019-07-23 RX ORDER — SODIUM CHLORIDE 0.9 % (FLUSH) 0.9 %
10 SYRINGE (ML) INJECTION AS NEEDED
Status: DISCONTINUED | OUTPATIENT
Start: 2019-07-23 | End: 2019-07-24 | Stop reason: HOSPADM

## 2019-07-23 RX ADMIN — ASPIRIN 324 MG: 81 TABLET, CHEWABLE ORAL at 22:57

## 2019-07-24 ENCOUNTER — HOSPITAL ENCOUNTER (INPATIENT)
Facility: HOSPITAL | Age: 59
LOS: 8 days | Discharge: HOME HEALTH | End: 2019-08-01
Attending: FAMILY MEDICINE | Admitting: INTERNAL MEDICINE

## 2019-07-24 ENCOUNTER — APPOINTMENT (OUTPATIENT)
Dept: GENERAL RADIOLOGY | Facility: HOSPITAL | Age: 59
End: 2019-07-24

## 2019-07-24 ENCOUNTER — APPOINTMENT (OUTPATIENT)
Dept: CT IMAGING | Facility: HOSPITAL | Age: 59
End: 2019-07-24

## 2019-07-24 ENCOUNTER — APPOINTMENT (OUTPATIENT)
Dept: MRI IMAGING | Facility: HOSPITAL | Age: 59
End: 2019-07-24

## 2019-07-24 ENCOUNTER — APPOINTMENT (OUTPATIENT)
Dept: NEUROLOGY | Facility: HOSPITAL | Age: 59
End: 2019-07-24

## 2019-07-24 VITALS
BODY MASS INDEX: 21.66 KG/M2 | RESPIRATION RATE: 18 BRPM | HEIGHT: 65 IN | TEMPERATURE: 98.6 F | OXYGEN SATURATION: 99 % | HEART RATE: 74 BPM | SYSTOLIC BLOOD PRESSURE: 151 MMHG | DIASTOLIC BLOOD PRESSURE: 102 MMHG | WEIGHT: 130 LBS

## 2019-07-24 DIAGNOSIS — G93.41 METABOLIC ENCEPHALOPATHY: ICD-10-CM

## 2019-07-24 DIAGNOSIS — E11.65 TYPE 2 DIABETES MELLITUS WITH HYPERGLYCEMIA, WITH LONG-TERM CURRENT USE OF INSULIN (HCC): ICD-10-CM

## 2019-07-24 DIAGNOSIS — Z74.09 IMPAIRED MOBILITY AND ADLS: Primary | ICD-10-CM

## 2019-07-24 DIAGNOSIS — Z78.9 IMPAIRED MOBILITY AND ADLS: Primary | ICD-10-CM

## 2019-07-24 DIAGNOSIS — S82.401G CLOSED FRACTURE OF RIGHT TIBIA AND FIBULA WITH DELAYED HEALING, SUBSEQUENT ENCOUNTER: ICD-10-CM

## 2019-07-24 DIAGNOSIS — S82.201G CLOSED FRACTURE OF RIGHT TIBIA AND FIBULA WITH DELAYED HEALING, SUBSEQUENT ENCOUNTER: ICD-10-CM

## 2019-07-24 DIAGNOSIS — N18.30 CKD (CHRONIC KIDNEY DISEASE) STAGE 3, GFR 30-59 ML/MIN (HCC): ICD-10-CM

## 2019-07-24 DIAGNOSIS — Z79.4 TYPE 2 DIABETES MELLITUS WITH HYPERGLYCEMIA, WITH LONG-TERM CURRENT USE OF INSULIN (HCC): ICD-10-CM

## 2019-07-24 DIAGNOSIS — R41.841 COGNITIVE COMMUNICATION DISORDER: ICD-10-CM

## 2019-07-24 DIAGNOSIS — S91.001A WOUND OF RIGHT ANKLE, INITIAL ENCOUNTER: ICD-10-CM

## 2019-07-24 DIAGNOSIS — Z74.09 IMPAIRED FUNCTIONAL MOBILITY, BALANCE, GAIT, AND ENDURANCE: ICD-10-CM

## 2019-07-24 DIAGNOSIS — I63.9 CEREBROVASCULAR ACCIDENT (CVA), UNSPECIFIED MECHANISM (HCC): ICD-10-CM

## 2019-07-24 PROBLEM — F32.A DEPRESSION: Status: ACTIVE | Noted: 2019-07-24

## 2019-07-24 PROBLEM — I50.32 CHRONIC DIASTOLIC CHF (CONGESTIVE HEART FAILURE): Status: ACTIVE | Noted: 2019-07-24

## 2019-07-24 PROBLEM — E87.5 HYPERKALEMIA: Status: ACTIVE | Noted: 2019-07-24

## 2019-07-24 PROBLEM — S82.209A TIBIA/FIBULA FRACTURE: Status: ACTIVE | Noted: 2018-12-25

## 2019-07-24 PROBLEM — I10 HTN (HYPERTENSION): Status: ACTIVE | Noted: 2019-07-24

## 2019-07-24 PROBLEM — R47.01 EXPRESSIVE APHASIA: Status: ACTIVE | Noted: 2019-07-24

## 2019-07-24 PROBLEM — L89.159 DECUBITUS ULCER OF COCCYX, UNSPECIFIED PRESSURE ULCER STAGE: Status: ACTIVE | Noted: 2019-07-24

## 2019-07-24 PROBLEM — S82.409A TIBIA/FIBULA FRACTURE: Status: ACTIVE | Noted: 2018-12-25

## 2019-07-24 LAB
AMMONIA BLD-SCNC: 52 UMOL/L (ref 11–51)
CHOLEST SERPL-MCNC: 183 MG/DL (ref 0–200)
FOLATE SERPL-MCNC: 15.6 NG/ML (ref 4.78–24.2)
GLUCOSE BLDC GLUCOMTR-MCNC: 120 MG/DL (ref 70–130)
GLUCOSE BLDC GLUCOMTR-MCNC: 229 MG/DL (ref 70–130)
GLUCOSE BLDC GLUCOMTR-MCNC: 276 MG/DL (ref 70–130)
HBA1C MFR BLD: 9.2 % (ref 4.8–5.6)
HDLC SERPL-MCNC: 56 MG/DL (ref 40–60)
LDLC SERPL CALC-MCNC: 90 MG/DL (ref 0–100)
LDLC/HDLC SERPL: 1.6 {RATIO}
T4 FREE SERPL-MCNC: 1.16 NG/DL (ref 0.93–1.7)
TRIGL SERPL-MCNC: 186 MG/DL (ref 0–150)
TSH SERPL DL<=0.05 MIU/L-ACNC: 8.32 MIU/ML (ref 0.27–4.2)
VIT B12 BLD-MCNC: 347 PG/ML (ref 211–946)
VLDLC SERPL-MCNC: 37.2 MG/DL

## 2019-07-24 PROCEDURE — 84439 ASSAY OF FREE THYROXINE: CPT | Performed by: PSYCHIATRY & NEUROLOGY

## 2019-07-24 PROCEDURE — 74018 RADEX ABDOMEN 1 VIEW: CPT

## 2019-07-24 PROCEDURE — 70551 MRI BRAIN STEM W/O DYE: CPT

## 2019-07-24 PROCEDURE — G0378 HOSPITAL OBSERVATION PER HR: HCPCS

## 2019-07-24 PROCEDURE — 97166 OT EVAL MOD COMPLEX 45 MIN: CPT

## 2019-07-24 PROCEDURE — 73590 X-RAY EXAM OF LOWER LEG: CPT

## 2019-07-24 PROCEDURE — 0042T HC CT CEREBRAL PERFUSION W/WO CONTRAST: CPT

## 2019-07-24 PROCEDURE — 70496 CT ANGIOGRAPHY HEAD: CPT

## 2019-07-24 PROCEDURE — 0 IOPAMIDOL PER 1 ML: Performed by: FAMILY MEDICINE

## 2019-07-24 PROCEDURE — 63710000001 INSULIN LISPRO (HUMAN) PER 5 UNITS: Performed by: PHYSICIAN ASSISTANT

## 2019-07-24 PROCEDURE — 92523 SPEECH SOUND LANG COMPREHEN: CPT

## 2019-07-24 PROCEDURE — 99223 1ST HOSP IP/OBS HIGH 75: CPT | Performed by: FAMILY MEDICINE

## 2019-07-24 PROCEDURE — 83036 HEMOGLOBIN GLYCOSYLATED A1C: CPT | Performed by: PHYSICIAN ASSISTANT

## 2019-07-24 PROCEDURE — 82962 GLUCOSE BLOOD TEST: CPT

## 2019-07-24 PROCEDURE — 82140 ASSAY OF AMMONIA: CPT | Performed by: PSYCHIATRY & NEUROLOGY

## 2019-07-24 PROCEDURE — 97530 THERAPEUTIC ACTIVITIES: CPT

## 2019-07-24 PROCEDURE — 95816 EEG AWAKE AND DROWSY: CPT

## 2019-07-24 PROCEDURE — 25010000003 LEVETIRACETAM IN NACL 0.75% 1000 MG/100ML SOLUTION: Performed by: PSYCHIATRY & NEUROLOGY

## 2019-07-24 PROCEDURE — 70498 CT ANGIOGRAPHY NECK: CPT

## 2019-07-24 PROCEDURE — 99255 IP/OBS CONSLTJ NEW/EST HI 80: CPT | Performed by: PSYCHIATRY & NEUROLOGY

## 2019-07-24 PROCEDURE — 63710000001 INSULIN DETEMIR PER 5 UNITS: Performed by: PHYSICIAN ASSISTANT

## 2019-07-24 PROCEDURE — 97162 PT EVAL MOD COMPLEX 30 MIN: CPT

## 2019-07-24 PROCEDURE — 84443 ASSAY THYROID STIM HORMONE: CPT | Performed by: PHYSICIAN ASSISTANT

## 2019-07-24 PROCEDURE — 25010000002 CEFEPIME PER 500 MG: Performed by: INTERNAL MEDICINE

## 2019-07-24 PROCEDURE — 80061 LIPID PANEL: CPT | Performed by: PHYSICIAN ASSISTANT

## 2019-07-24 RX ORDER — FLUOXETINE HYDROCHLORIDE 20 MG/1
20 CAPSULE ORAL DAILY
Status: DISCONTINUED | OUTPATIENT
Start: 2019-07-24 | End: 2019-08-01 | Stop reason: HOSPADM

## 2019-07-24 RX ORDER — SODIUM CHLORIDE 0.9 % (FLUSH) 0.9 %
3 SYRINGE (ML) INJECTION EVERY 12 HOURS SCHEDULED
Status: DISCONTINUED | OUTPATIENT
Start: 2019-07-24 | End: 2019-08-01 | Stop reason: HOSPADM

## 2019-07-24 RX ORDER — SODIUM CHLORIDE 0.9 % (FLUSH) 0.9 %
3-10 SYRINGE (ML) INJECTION AS NEEDED
Status: DISCONTINUED | OUTPATIENT
Start: 2019-07-24 | End: 2019-08-01 | Stop reason: HOSPADM

## 2019-07-24 RX ORDER — NICOTINE POLACRILEX 4 MG
15 LOZENGE BUCCAL
Status: DISCONTINUED | OUTPATIENT
Start: 2019-07-24 | End: 2019-08-01 | Stop reason: HOSPADM

## 2019-07-24 RX ORDER — PANTOPRAZOLE SODIUM 40 MG/1
40 TABLET, DELAYED RELEASE ORAL DAILY
Status: DISCONTINUED | OUTPATIENT
Start: 2019-07-24 | End: 2019-08-01 | Stop reason: HOSPADM

## 2019-07-24 RX ORDER — LEVETIRACETAM 10 MG/ML
1000 INJECTION INTRAVASCULAR ONCE
Status: COMPLETED | OUTPATIENT
Start: 2019-07-24 | End: 2019-07-24

## 2019-07-24 RX ORDER — CLOPIDOGREL BISULFATE 75 MG/1
75 TABLET ORAL DAILY
Status: DISCONTINUED | OUTPATIENT
Start: 2019-07-24 | End: 2019-07-25

## 2019-07-24 RX ORDER — ONDANSETRON 4 MG/1
4 TABLET, FILM COATED ORAL EVERY 6 HOURS PRN
Status: DISCONTINUED | OUTPATIENT
Start: 2019-07-24 | End: 2019-08-01 | Stop reason: HOSPADM

## 2019-07-24 RX ORDER — ATORVASTATIN CALCIUM 40 MG/1
40 TABLET, FILM COATED ORAL DAILY
Status: DISCONTINUED | OUTPATIENT
Start: 2019-07-24 | End: 2019-08-01 | Stop reason: HOSPADM

## 2019-07-24 RX ORDER — ROPINIROLE 0.5 MG/1
0.5 TABLET, FILM COATED ORAL 2 TIMES DAILY
Status: DISCONTINUED | OUTPATIENT
Start: 2019-07-24 | End: 2019-08-01 | Stop reason: HOSPADM

## 2019-07-24 RX ORDER — LEVETIRACETAM 10 MG/ML
1000 INJECTION INTRAVASCULAR EVERY 12 HOURS SCHEDULED
Status: DISCONTINUED | OUTPATIENT
Start: 2019-07-25 | End: 2019-07-28

## 2019-07-24 RX ORDER — DEXTROSE MONOHYDRATE 25 G/50ML
25 INJECTION, SOLUTION INTRAVENOUS
Status: DISCONTINUED | OUTPATIENT
Start: 2019-07-24 | End: 2019-08-01 | Stop reason: HOSPADM

## 2019-07-24 RX ORDER — SODIUM CHLORIDE 9 MG/ML
75 INJECTION, SOLUTION INTRAVENOUS ONCE
Status: COMPLETED | OUTPATIENT
Start: 2019-07-24 | End: 2019-07-24

## 2019-07-24 RX ORDER — ONDANSETRON 2 MG/ML
4 INJECTION INTRAMUSCULAR; INTRAVENOUS EVERY 6 HOURS PRN
Status: DISCONTINUED | OUTPATIENT
Start: 2019-07-24 | End: 2019-08-01 | Stop reason: HOSPADM

## 2019-07-24 RX ADMIN — LEVETIRACETAM INJECTION 1000 MG: 10 INJECTION INTRAVENOUS at 23:51

## 2019-07-24 RX ADMIN — ROPINIROLE 0.5 MG: 0.5 TABLET, FILM COATED ORAL at 21:42

## 2019-07-24 RX ADMIN — CLOPIDOGREL BISULFATE 75 MG: 75 TABLET ORAL at 08:50

## 2019-07-24 RX ADMIN — INSULIN LISPRO 4 UNITS: 100 INJECTION, SOLUTION INTRAVENOUS; SUBCUTANEOUS at 18:19

## 2019-07-24 RX ADMIN — FLUOXETINE HYDROCHLORIDE 20 MG: 20 CAPSULE ORAL at 08:50

## 2019-07-24 RX ADMIN — IOPAMIDOL 115 ML: 755 INJECTION, SOLUTION INTRAVENOUS at 05:28

## 2019-07-24 RX ADMIN — INSULIN DETEMIR 10 UNITS: 100 INJECTION, SOLUTION SUBCUTANEOUS at 09:10

## 2019-07-24 RX ADMIN — INSULIN DETEMIR 10 UNITS: 100 INJECTION, SOLUTION SUBCUTANEOUS at 21:49

## 2019-07-24 RX ADMIN — ATORVASTATIN CALCIUM 40 MG: 40 TABLET, FILM COATED ORAL at 08:50

## 2019-07-24 RX ADMIN — SODIUM CHLORIDE, PRESERVATIVE FREE 3 ML: 5 INJECTION INTRAVENOUS at 21:42

## 2019-07-24 RX ADMIN — SODIUM CHLORIDE 75 ML/HR: 9 INJECTION, SOLUTION INTRAVENOUS at 09:11

## 2019-07-24 RX ADMIN — CEFEPIME HYDROCHLORIDE 2 G: 2 INJECTION, POWDER, FOR SOLUTION INTRAVENOUS at 15:58

## 2019-07-24 RX ADMIN — ROPINIROLE 0.5 MG: 0.5 TABLET, FILM COATED ORAL at 08:51

## 2019-07-24 RX ADMIN — LEVETIRACETAM INJECTION 1000 MG: 10 INJECTION INTRAVENOUS at 16:35

## 2019-07-24 RX ADMIN — SODIUM CHLORIDE, PRESERVATIVE FREE 3 ML: 5 INJECTION INTRAVENOUS at 08:53

## 2019-07-25 ENCOUNTER — APPOINTMENT (OUTPATIENT)
Dept: NEUROLOGY | Facility: HOSPITAL | Age: 59
End: 2019-07-25

## 2019-07-25 LAB
GLUCOSE BLDC GLUCOMTR-MCNC: 110 MG/DL (ref 70–130)
GLUCOSE BLDC GLUCOMTR-MCNC: 115 MG/DL (ref 70–130)
GLUCOSE BLDC GLUCOMTR-MCNC: 125 MG/DL (ref 70–130)
GLUCOSE BLDC GLUCOMTR-MCNC: 127 MG/DL (ref 70–130)
GLUCOSE BLDC GLUCOMTR-MCNC: 154 MG/DL (ref 70–130)

## 2019-07-25 PROCEDURE — 25010000002 HYDRALAZINE PER 20 MG: Performed by: NURSE PRACTITIONER

## 2019-07-25 PROCEDURE — 87040 BLOOD CULTURE FOR BACTERIA: CPT | Performed by: INTERNAL MEDICINE

## 2019-07-25 PROCEDURE — 25010000002 ACYCLOVIR PER 5 MG

## 2019-07-25 PROCEDURE — 25010000002 HYDROMORPHONE PER 4 MG: Performed by: PSYCHIATRY & NEUROLOGY

## 2019-07-25 PROCEDURE — 25010000002 CEFTRIAXONE PER 250 MG: Performed by: NURSE PRACTITIONER

## 2019-07-25 PROCEDURE — 97110 THERAPEUTIC EXERCISES: CPT

## 2019-07-25 PROCEDURE — 95951 HC EEG WITH VIDEO RECORDING EACH 24 HRS: CPT

## 2019-07-25 PROCEDURE — 82962 GLUCOSE BLOOD TEST: CPT

## 2019-07-25 PROCEDURE — 97530 THERAPEUTIC ACTIVITIES: CPT

## 2019-07-25 PROCEDURE — 99233 SBSQ HOSP IP/OBS HIGH 50: CPT | Performed by: PSYCHIATRY & NEUROLOGY

## 2019-07-25 PROCEDURE — 63710000001 INSULIN DETEMIR PER 5 UNITS: Performed by: PHYSICIAN ASSISTANT

## 2019-07-25 PROCEDURE — 99233 SBSQ HOSP IP/OBS HIGH 50: CPT | Performed by: INTERNAL MEDICINE

## 2019-07-25 PROCEDURE — 95819 EEG AWAKE AND ASLEEP: CPT

## 2019-07-25 PROCEDURE — 25010000002 CEFEPIME 2 G/NS 100 ML SOLUTION: Performed by: INTERNAL MEDICINE

## 2019-07-25 PROCEDURE — 25010000002 KETOROLAC TROMETHAMINE PER 15 MG: Performed by: NURSE PRACTITIONER

## 2019-07-25 PROCEDURE — 25010000002 HYDRALAZINE PER 20 MG: Performed by: INTERNAL MEDICINE

## 2019-07-25 PROCEDURE — 25010000003 LEVETIRACETAM IN NACL 0.75% 1000 MG/100ML SOLUTION: Performed by: PSYCHIATRY & NEUROLOGY

## 2019-07-25 RX ORDER — HYDRALAZINE HYDROCHLORIDE 20 MG/ML
10 INJECTION INTRAMUSCULAR; INTRAVENOUS ONCE
Status: COMPLETED | OUTPATIENT
Start: 2019-07-25 | End: 2019-07-25

## 2019-07-25 RX ORDER — ASPIRIN 300 MG/1
325 SUPPOSITORY RECTAL DAILY
Status: DISCONTINUED | OUTPATIENT
Start: 2019-07-25 | End: 2019-07-27

## 2019-07-25 RX ORDER — CLONIDINE HYDROCHLORIDE 0.1 MG/1
0.1 TABLET ORAL EVERY 6 HOURS PRN
Status: DISCONTINUED | OUTPATIENT
Start: 2019-07-25 | End: 2019-08-01 | Stop reason: HOSPADM

## 2019-07-25 RX ORDER — KETOROLAC TROMETHAMINE 30 MG/ML
15 INJECTION, SOLUTION INTRAMUSCULAR; INTRAVENOUS ONCE
Status: COMPLETED | OUTPATIENT
Start: 2019-07-25 | End: 2019-07-25

## 2019-07-25 RX ORDER — METOPROLOL SUCCINATE 50 MG/1
50 TABLET, EXTENDED RELEASE ORAL DAILY
Status: DISCONTINUED | OUTPATIENT
Start: 2019-07-25 | End: 2019-08-01 | Stop reason: HOSPADM

## 2019-07-25 RX ORDER — HYDRALAZINE HYDROCHLORIDE 20 MG/ML
10 INJECTION INTRAMUSCULAR; INTRAVENOUS EVERY 6 HOURS PRN
Status: DISCONTINUED | OUTPATIENT
Start: 2019-07-25 | End: 2019-08-01 | Stop reason: HOSPADM

## 2019-07-25 RX ORDER — HYDROMORPHONE HYDROCHLORIDE 1 MG/ML
0.5 INJECTION, SOLUTION INTRAMUSCULAR; INTRAVENOUS; SUBCUTANEOUS
Status: DISPENSED | OUTPATIENT
Start: 2019-07-25 | End: 2019-07-27

## 2019-07-25 RX ORDER — HYDRALAZINE HYDROCHLORIDE 25 MG/1
25 TABLET, FILM COATED ORAL EVERY 8 HOURS SCHEDULED
Status: DISCONTINUED | OUTPATIENT
Start: 2019-07-25 | End: 2019-08-01 | Stop reason: HOSPADM

## 2019-07-25 RX ADMIN — PANTOPRAZOLE SODIUM 40 MG: 40 TABLET, DELAYED RELEASE ORAL at 09:00

## 2019-07-25 RX ADMIN — HYDROMORPHONE HYDROCHLORIDE 0.5 MG: 1 INJECTION, SOLUTION INTRAMUSCULAR; INTRAVENOUS; SUBCUTANEOUS at 16:00

## 2019-07-25 RX ADMIN — LEVETIRACETAM INJECTION 1000 MG: 10 INJECTION INTRAVENOUS at 11:49

## 2019-07-25 RX ADMIN — CEFTRIAXONE SODIUM 2 G: 2 INJECTION, POWDER, FOR SOLUTION INTRAMUSCULAR; INTRAVENOUS at 16:09

## 2019-07-25 RX ADMIN — HYDROMORPHONE HYDROCHLORIDE 0.5 MG: 1 INJECTION, SOLUTION INTRAMUSCULAR; INTRAVENOUS; SUBCUTANEOUS at 11:46

## 2019-07-25 RX ADMIN — HYDRALAZINE HYDROCHLORIDE 10 MG: 20 INJECTION INTRAMUSCULAR; INTRAVENOUS at 04:04

## 2019-07-25 RX ADMIN — VALPROATE SODIUM 1200 MG: 100 INJECTION, SOLUTION INTRAVENOUS at 12:58

## 2019-07-25 RX ADMIN — HYDRALAZINE HYDROCHLORIDE 10 MG: 20 INJECTION INTRAMUSCULAR; INTRAVENOUS at 19:56

## 2019-07-25 RX ADMIN — HYDRALAZINE HYDROCHLORIDE 10 MG: 20 INJECTION INTRAMUSCULAR; INTRAVENOUS at 23:19

## 2019-07-25 RX ADMIN — INSULIN DETEMIR 10 UNITS: 100 INJECTION, SOLUTION SUBCUTANEOUS at 23:00

## 2019-07-25 RX ADMIN — CEFEPIME 2 G: 2 INJECTION, POWDER, FOR SOLUTION INTRAVENOUS at 03:03

## 2019-07-25 RX ADMIN — ACYCLOVIR SODIUM 437 MG: 50 INJECTION, SOLUTION INTRAVENOUS at 03:41

## 2019-07-25 RX ADMIN — HYDROMORPHONE HYDROCHLORIDE 0.5 MG: 1 INJECTION, SOLUTION INTRAMUSCULAR; INTRAVENOUS; SUBCUTANEOUS at 04:13

## 2019-07-25 RX ADMIN — INSULIN LISPRO 2 UNITS: 100 INJECTION, SOLUTION INTRAVENOUS; SUBCUTANEOUS at 13:26

## 2019-07-25 RX ADMIN — ROPINIROLE 0.5 MG: 0.5 TABLET, FILM COATED ORAL at 22:56

## 2019-07-25 RX ADMIN — ATORVASTATIN CALCIUM 40 MG: 40 TABLET, FILM COATED ORAL at 09:00

## 2019-07-25 RX ADMIN — HYDROMORPHONE HYDROCHLORIDE 0.5 MG: 1 INJECTION, SOLUTION INTRAMUSCULAR; INTRAVENOUS; SUBCUTANEOUS at 01:52

## 2019-07-25 RX ADMIN — SODIUM CHLORIDE, PRESERVATIVE FREE 3 ML: 5 INJECTION INTRAVENOUS at 09:01

## 2019-07-25 RX ADMIN — ROPINIROLE 0.5 MG: 0.5 TABLET, FILM COATED ORAL at 09:00

## 2019-07-25 RX ADMIN — KETOROLAC TROMETHAMINE 15 MG: 30 INJECTION, SOLUTION INTRAMUSCULAR at 01:30

## 2019-07-25 RX ADMIN — CEFTRIAXONE SODIUM 2 G: 2 INJECTION, POWDER, FOR SOLUTION INTRAMUSCULAR; INTRAVENOUS at 04:06

## 2019-07-25 RX ADMIN — VALPROATE SODIUM 500 MG: 100 INJECTION, SOLUTION INTRAVENOUS at 22:56

## 2019-07-25 RX ADMIN — INSULIN DETEMIR 10 UNITS: 100 INJECTION, SOLUTION SUBCUTANEOUS at 08:59

## 2019-07-25 RX ADMIN — HYDRALAZINE HYDROCHLORIDE 10 MG: 20 INJECTION INTRAMUSCULAR; INTRAVENOUS at 00:17

## 2019-07-25 RX ADMIN — SODIUM CHLORIDE, PRESERVATIVE FREE 3 ML: 5 INJECTION INTRAVENOUS at 22:56

## 2019-07-25 RX ADMIN — FLUOXETINE HYDROCHLORIDE 20 MG: 20 CAPSULE ORAL at 09:00

## 2019-07-25 RX ADMIN — ACYCLOVIR SODIUM 440 MG: 50 INJECTION, SOLUTION INTRAVENOUS at 17:44

## 2019-07-26 ENCOUNTER — APPOINTMENT (OUTPATIENT)
Dept: GENERAL RADIOLOGY | Facility: HOSPITAL | Age: 59
End: 2019-07-26

## 2019-07-26 ENCOUNTER — APPOINTMENT (OUTPATIENT)
Dept: NEUROLOGY | Facility: HOSPITAL | Age: 59
End: 2019-07-26

## 2019-07-26 LAB
ANION GAP SERPL CALCULATED.3IONS-SCNC: 19 MMOL/L (ref 5–15)
APPEARANCE CSF: CLEAR
APPEARANCE CSF: CLEAR
BASOPHILS # BLD AUTO: 0.03 10*3/MM3 (ref 0–0.2)
BASOPHILS NFR BLD AUTO: 0.6 % (ref 0–1.5)
BUN BLD-MCNC: 25 MG/DL (ref 8–23)
BUN/CREAT SERPL: 25 (ref 7–25)
C GATTII+NEOFOR DNA CSF QL NAA+NON-PROBE: NOT DETECTED
CALCIUM SPEC-SCNC: 8.4 MG/DL (ref 8.6–10.5)
CHLORIDE SERPL-SCNC: 107 MMOL/L (ref 98–107)
CMV DNA CSF QL NAA+PROBE: NOT DETECTED
CO2 SERPL-SCNC: 19 MMOL/L (ref 22–29)
COLOR CSF: COLORLESS
COLOR CSF: COLORLESS
COLOR SPUN CSF: COLORLESS
COLOR SPUN CSF: COLORLESS
CREAT BLD-MCNC: 1 MG/DL (ref 0.57–1)
DEPRECATED RDW RBC AUTO: 49.9 FL (ref 37–54)
E COLI K1 DNA CSF QL NAA+NON-PROBE: NOT DETECTED
EOSINOPHIL # BLD AUTO: 0.02 10*3/MM3 (ref 0–0.4)
EOSINOPHIL NFR BLD AUTO: 0.4 % (ref 0.3–6.2)
ERYTHROCYTE [DISTWIDTH] IN BLOOD BY AUTOMATED COUNT: 15.4 % (ref 12.3–15.4)
EV RNA CSF QL NAA+PROBE: NOT DETECTED
GFR SERPL CREATININE-BSD FRML MDRD: 56 ML/MIN/1.73
GLUCOSE BLD-MCNC: 208 MG/DL (ref 65–99)
GLUCOSE BLDC GLUCOMTR-MCNC: 137 MG/DL (ref 70–130)
GLUCOSE BLDC GLUCOMTR-MCNC: 175 MG/DL (ref 70–130)
GLUCOSE BLDC GLUCOMTR-MCNC: 177 MG/DL (ref 70–130)
GLUCOSE BLDC GLUCOMTR-MCNC: 187 MG/DL (ref 70–130)
GLUCOSE CSF-MCNC: 95 MG/DL (ref 40–70)
GP B STREP DNA SPEC QL NAA+PROBE: NOT DETECTED
HAEM INFLU SEROTYP DNA SPEC NAA+PROBE: NOT DETECTED
HCT VFR BLD AUTO: 31.7 % (ref 34–46.6)
HGB BLD-MCNC: 10.3 G/DL (ref 12–15.9)
HHV6 DNA CSF QL NAA+PROBE: NOT DETECTED
HSV1 DNA CSF QL NAA+PROBE: NOT DETECTED
HSV2 DNA CSF QL NAA+PROBE: NOT DETECTED
IMM GRANULOCYTES # BLD AUTO: 0.01 10*3/MM3 (ref 0–0.05)
IMM GRANULOCYTES NFR BLD AUTO: 0.2 % (ref 0–0.5)
L MONOCYTOG RRNA SPEC QL PROBE: NOT DETECTED
LYMPHOCYTES # BLD AUTO: 0.66 10*3/MM3 (ref 0.7–3.1)
LYMPHOCYTES NFR BLD AUTO: 12.8 % (ref 19.6–45.3)
MCH RBC QN AUTO: 29.3 PG (ref 26.6–33)
MCHC RBC AUTO-ENTMCNC: 32.5 G/DL (ref 31.5–35.7)
MCV RBC AUTO: 90.1 FL (ref 79–97)
MONOCYTES # BLD AUTO: 0.32 10*3/MM3 (ref 0.1–0.9)
MONOCYTES NFR BLD AUTO: 6.2 % (ref 5–12)
N MEN DNA SPEC QL NAA+PROBE: NOT DETECTED
NEUTROPHILS # BLD AUTO: 4.11 10*3/MM3 (ref 1.7–7)
NEUTROPHILS NFR BLD AUTO: 79.8 % (ref 42.7–76)
NRBC BLD AUTO-RTO: 0 /100 WBC (ref 0–0.2)
PARECHOVIRUS A RNA CSF QL NAA+NON-PROBE: NOT DETECTED
PLATELET # BLD AUTO: 132 10*3/MM3 (ref 140–450)
PMV BLD AUTO: 10.1 FL (ref 6–12)
POTASSIUM BLD-SCNC: 3.9 MMOL/L (ref 3.5–5.2)
PROT CSF-MCNC: 40.1 MG/DL (ref 15–45)
RBC # BLD AUTO: 3.52 10*6/MM3 (ref 3.77–5.28)
RBC # CSF MANUAL: 10 /MM3 (ref 0–5)
RBC # CSF MANUAL: 176 /MM3 (ref 0–5)
S PNEUM DNA CSF QL NAA+NON-PROBE: NOT DETECTED
SODIUM BLD-SCNC: 145 MMOL/L (ref 136–145)
SPECIMEN VOL CSF: 7.5 ML
SPECIMEN VOL CSF: 7.5 ML
TUBE # CSF: 1
TUBE # CSF: 4
VALPROATE SERPL-MCNC: 59.5 MCG/ML (ref 50–125)
VZV DNA CSF QL NAA+PROBE: NOT DETECTED
WBC # CSF MANUAL: 1 /MM3 (ref 0–5)
WBC # CSF MANUAL: 4 /MM3 (ref 0–5)
WBC NRBC COR # BLD: 5.15 10*3/MM3 (ref 3.4–10.8)

## 2019-07-26 PROCEDURE — 25010000003 LIDOCAINE 1 % SOLUTION: Performed by: PHYSICIAN ASSISTANT

## 2019-07-26 PROCEDURE — 25010000003 LEVETIRACETAM IN NACL 0.75% 1000 MG/100ML SOLUTION: Performed by: PSYCHIATRY & NEUROLOGY

## 2019-07-26 PROCEDURE — 95951 HC EEG WITH VIDEO RECORDING EACH 24 HRS: CPT

## 2019-07-26 PROCEDURE — 85025 COMPLETE CBC W/AUTO DIFF WBC: CPT | Performed by: PSYCHIATRY & NEUROLOGY

## 2019-07-26 PROCEDURE — 89050 BODY FLUID CELL COUNT: CPT | Performed by: PSYCHIATRY & NEUROLOGY

## 2019-07-26 PROCEDURE — 99232 SBSQ HOSP IP/OBS MODERATE 35: CPT | Performed by: PSYCHIATRY & NEUROLOGY

## 2019-07-26 PROCEDURE — 25010000002 ACYCLOVIR PER 5 MG

## 2019-07-26 PROCEDURE — 84157 ASSAY OF PROTEIN OTHER: CPT | Performed by: PSYCHIATRY & NEUROLOGY

## 2019-07-26 PROCEDURE — 009U3ZX DRAINAGE OF SPINAL CANAL, PERCUTANEOUS APPROACH, DIAGNOSTIC: ICD-10-PCS | Performed by: PSYCHIATRY & NEUROLOGY

## 2019-07-26 PROCEDURE — 80048 BASIC METABOLIC PNL TOTAL CA: CPT | Performed by: PSYCHIATRY & NEUROLOGY

## 2019-07-26 PROCEDURE — 82962 GLUCOSE BLOOD TEST: CPT

## 2019-07-26 PROCEDURE — 82945 GLUCOSE OTHER FLUID: CPT | Performed by: PSYCHIATRY & NEUROLOGY

## 2019-07-26 PROCEDURE — 87483 CNS DNA AMP PROBE TYPE 12-25: CPT | Performed by: PSYCHIATRY & NEUROLOGY

## 2019-07-26 PROCEDURE — 92507 TX SP LANG VOICE COMM INDIV: CPT

## 2019-07-26 PROCEDURE — 63710000001 INSULIN DETEMIR PER 5 UNITS: Performed by: PHYSICIAN ASSISTANT

## 2019-07-26 PROCEDURE — 77003 FLUOROGUIDE FOR SPINE INJECT: CPT

## 2019-07-26 PROCEDURE — 99233 SBSQ HOSP IP/OBS HIGH 50: CPT | Performed by: INTERNAL MEDICINE

## 2019-07-26 PROCEDURE — 25010000002 HALOPERIDOL LACTATE PER 5 MG: Performed by: PSYCHIATRY & NEUROLOGY

## 2019-07-26 PROCEDURE — 25010000002 CEFTRIAXONE PER 250 MG: Performed by: NURSE PRACTITIONER

## 2019-07-26 PROCEDURE — 80164 ASSAY DIPROPYLACETIC ACD TOT: CPT | Performed by: PSYCHIATRY & NEUROLOGY

## 2019-07-26 PROCEDURE — 25010000002 HYDROMORPHONE PER 4 MG: Performed by: PSYCHIATRY & NEUROLOGY

## 2019-07-26 PROCEDURE — 25010000002 ONDANSETRON PER 1 MG: Performed by: PHYSICIAN ASSISTANT

## 2019-07-26 RX ORDER — HALOPERIDOL 5 MG/ML
1 INJECTION INTRAMUSCULAR ONCE
Status: COMPLETED | OUTPATIENT
Start: 2019-07-26 | End: 2019-07-26

## 2019-07-26 RX ORDER — LIDOCAINE HYDROCHLORIDE 10 MG/ML
INJECTION, SOLUTION EPIDURAL; INFILTRATION; INTRACAUDAL; PERINEURAL
Status: COMPLETED
Start: 2019-07-26 | End: 2019-07-26

## 2019-07-26 RX ORDER — HYDRALAZINE HYDROCHLORIDE 20 MG/ML
10 INJECTION INTRAMUSCULAR; INTRAVENOUS EVERY 6 HOURS PRN
Status: CANCELLED | OUTPATIENT
Start: 2019-07-26 | End: 2019-08-02

## 2019-07-26 RX ORDER — LIDOCAINE HYDROCHLORIDE 10 MG/ML
10 INJECTION, SOLUTION INFILTRATION; PERINEURAL ONCE
Status: COMPLETED | OUTPATIENT
Start: 2019-07-26 | End: 2019-07-26

## 2019-07-26 RX ORDER — HALOPERIDOL 5 MG/ML
1 INJECTION INTRAMUSCULAR ONCE AS NEEDED
Status: DISCONTINUED | OUTPATIENT
Start: 2019-07-26 | End: 2019-08-01 | Stop reason: HOSPADM

## 2019-07-26 RX ADMIN — CEFTRIAXONE SODIUM 2 G: 2 INJECTION, POWDER, FOR SOLUTION INTRAMUSCULAR; INTRAVENOUS at 16:32

## 2019-07-26 RX ADMIN — VALPROATE SODIUM 1000 MG: 100 INJECTION, SOLUTION INTRAVENOUS at 11:17

## 2019-07-26 RX ADMIN — INSULIN LISPRO 2 UNITS: 100 INJECTION, SOLUTION INTRAVENOUS; SUBCUTANEOUS at 20:59

## 2019-07-26 RX ADMIN — HALOPERIDOL LACTATE 1 MG: 5 INJECTION, SOLUTION INTRAMUSCULAR at 13:14

## 2019-07-26 RX ADMIN — INSULIN LISPRO 2 UNITS: 100 INJECTION, SOLUTION INTRAVENOUS; SUBCUTANEOUS at 18:23

## 2019-07-26 RX ADMIN — SODIUM CHLORIDE, PRESERVATIVE FREE 3 ML: 5 INJECTION INTRAVENOUS at 08:46

## 2019-07-26 RX ADMIN — SODIUM CHLORIDE, PRESERVATIVE FREE 3 ML: 5 INJECTION INTRAVENOUS at 21:00

## 2019-07-26 RX ADMIN — NICARDIPINE HYDROCHLORIDE 5 MG/HR: 0.1 INJECTION, SOLUTION INTRAVENOUS at 10:17

## 2019-07-26 RX ADMIN — ASPIRIN 300 MG: 300 SUPPOSITORY RECTAL at 00:01

## 2019-07-26 RX ADMIN — VALPROATE SODIUM 750 MG: 100 INJECTION, SOLUTION INTRAVENOUS at 17:32

## 2019-07-26 RX ADMIN — ACYCLOVIR SODIUM 440 MG: 50 INJECTION, SOLUTION INTRAVENOUS at 18:25

## 2019-07-26 RX ADMIN — INSULIN LISPRO 2 UNITS: 100 INJECTION, SOLUTION INTRAVENOUS; SUBCUTANEOUS at 08:48

## 2019-07-26 RX ADMIN — HYDROMORPHONE HYDROCHLORIDE 0.5 MG: 1 INJECTION, SOLUTION INTRAMUSCULAR; INTRAVENOUS; SUBCUTANEOUS at 00:07

## 2019-07-26 RX ADMIN — INSULIN DETEMIR 10 UNITS: 100 INJECTION, SOLUTION SUBCUTANEOUS at 08:47

## 2019-07-26 RX ADMIN — LEVETIRACETAM INJECTION 1000 MG: 10 INJECTION INTRAVENOUS at 00:01

## 2019-07-26 RX ADMIN — NICARDIPINE HYDROCHLORIDE 5 MG/HR: 0.1 INJECTION, SOLUTION INTRAVENOUS at 23:17

## 2019-07-26 RX ADMIN — HYDROMORPHONE HYDROCHLORIDE 0.5 MG: 1 INJECTION, SOLUTION INTRAMUSCULAR; INTRAVENOUS; SUBCUTANEOUS at 04:53

## 2019-07-26 RX ADMIN — ACYCLOVIR SODIUM 440 MG: 50 INJECTION, SOLUTION INTRAVENOUS at 04:17

## 2019-07-26 RX ADMIN — NICARDIPINE HYDROCHLORIDE 5 MG/HR: 0.1 INJECTION, SOLUTION INTRAVENOUS at 13:30

## 2019-07-26 RX ADMIN — NICARDIPINE HYDROCHLORIDE 5 MG/HR: 0.1 INJECTION, SOLUTION INTRAVENOUS at 18:22

## 2019-07-26 RX ADMIN — ONDANSETRON 4 MG: 2 INJECTION INTRAMUSCULAR; INTRAVENOUS at 00:14

## 2019-07-26 RX ADMIN — LEVETIRACETAM INJECTION 1000 MG: 10 INJECTION INTRAVENOUS at 23:54

## 2019-07-26 RX ADMIN — LIDOCAINE HYDROCHLORIDE 5 ML: 10 INJECTION, SOLUTION EPIDURAL; INFILTRATION; INTRACAUDAL; PERINEURAL at 11:05

## 2019-07-26 RX ADMIN — VALPROATE SODIUM 500 MG: 100 INJECTION, SOLUTION INTRAVENOUS at 05:56

## 2019-07-26 RX ADMIN — INSULIN DETEMIR 10 UNITS: 100 INJECTION, SOLUTION SUBCUTANEOUS at 20:59

## 2019-07-26 RX ADMIN — HYDROMORPHONE HYDROCHLORIDE 0.5 MG: 1 INJECTION, SOLUTION INTRAMUSCULAR; INTRAVENOUS; SUBCUTANEOUS at 21:00

## 2019-07-26 RX ADMIN — HALOPERIDOL LACTATE 1 MG: 5 INJECTION, SOLUTION INTRAMUSCULAR at 10:58

## 2019-07-26 RX ADMIN — HALOPERIDOL LACTATE 1 MG: 5 INJECTION, SOLUTION INTRAMUSCULAR at 11:05

## 2019-07-26 RX ADMIN — CEFTRIAXONE SODIUM 2 G: 2 INJECTION, POWDER, FOR SOLUTION INTRAMUSCULAR; INTRAVENOUS at 04:17

## 2019-07-26 RX ADMIN — LIDOCAINE HYDROCHLORIDE 10 ML: 10 INJECTION, SOLUTION INFILTRATION; PERINEURAL at 13:50

## 2019-07-26 RX ADMIN — LEVETIRACETAM INJECTION 1000 MG: 10 INJECTION INTRAVENOUS at 12:58

## 2019-07-27 LAB
GLUCOSE BLDC GLUCOMTR-MCNC: 120 MG/DL (ref 70–130)
GLUCOSE BLDC GLUCOMTR-MCNC: 223 MG/DL (ref 70–130)
GLUCOSE BLDC GLUCOMTR-MCNC: 240 MG/DL (ref 70–130)
GLUCOSE BLDC GLUCOMTR-MCNC: 246 MG/DL (ref 70–130)
VALPROATE SERPL-MCNC: 70.3 MCG/ML (ref 50–125)

## 2019-07-27 PROCEDURE — 25010000002 HYDROMORPHONE PER 4 MG: Performed by: PSYCHIATRY & NEUROLOGY

## 2019-07-27 PROCEDURE — 25010000002 ACYCLOVIR PER 5 MG

## 2019-07-27 PROCEDURE — 25010000002 CEFTRIAXONE PER 250 MG: Performed by: NURSE PRACTITIONER

## 2019-07-27 PROCEDURE — 99232 SBSQ HOSP IP/OBS MODERATE 35: CPT | Performed by: INTERNAL MEDICINE

## 2019-07-27 PROCEDURE — 25010000003 LEVETIRACETAM IN NACL 0.75% 1000 MG/100ML SOLUTION: Performed by: PSYCHIATRY & NEUROLOGY

## 2019-07-27 PROCEDURE — 82962 GLUCOSE BLOOD TEST: CPT

## 2019-07-27 PROCEDURE — 80164 ASSAY DIPROPYLACETIC ACD TOT: CPT | Performed by: PSYCHIATRY & NEUROLOGY

## 2019-07-27 PROCEDURE — 99232 SBSQ HOSP IP/OBS MODERATE 35: CPT | Performed by: PSYCHIATRY & NEUROLOGY

## 2019-07-27 PROCEDURE — 63710000001 INSULIN DETEMIR PER 5 UNITS: Performed by: PHYSICIAN ASSISTANT

## 2019-07-27 RX ORDER — ASPIRIN 325 MG
325 TABLET ORAL DAILY
Status: DISCONTINUED | OUTPATIENT
Start: 2019-07-27 | End: 2019-08-01 | Stop reason: HOSPADM

## 2019-07-27 RX ORDER — OXYMETAZOLINE HYDROCHLORIDE 0.05 G/100ML
2 SPRAY NASAL 2 TIMES DAILY
Status: COMPLETED | OUTPATIENT
Start: 2019-07-27 | End: 2019-07-30

## 2019-07-27 RX ORDER — HYDROMORPHONE HYDROCHLORIDE 1 MG/ML
0.5 INJECTION, SOLUTION INTRAMUSCULAR; INTRAVENOUS; SUBCUTANEOUS
Status: DISCONTINUED | OUTPATIENT
Start: 2019-07-27 | End: 2019-07-28

## 2019-07-27 RX ADMIN — VALPROATE SODIUM 750 MG: 100 INJECTION, SOLUTION INTRAVENOUS at 16:42

## 2019-07-27 RX ADMIN — NICARDIPINE HYDROCHLORIDE 5 MG/HR: 0.1 INJECTION, SOLUTION INTRAVENOUS at 07:24

## 2019-07-27 RX ADMIN — CEFTRIAXONE SODIUM 2 G: 2 INJECTION, POWDER, FOR SOLUTION INTRAMUSCULAR; INTRAVENOUS at 15:35

## 2019-07-27 RX ADMIN — Medication 2 SPRAY: at 21:23

## 2019-07-27 RX ADMIN — ASPIRIN 325 MG ORAL TABLET 325 MG: 325 PILL ORAL at 11:15

## 2019-07-27 RX ADMIN — INSULIN LISPRO 4 UNITS: 100 INJECTION, SOLUTION INTRAVENOUS; SUBCUTANEOUS at 11:15

## 2019-07-27 RX ADMIN — NICARDIPINE HYDROCHLORIDE 5 MG/HR: 0.1 INJECTION, SOLUTION INTRAVENOUS at 20:16

## 2019-07-27 RX ADMIN — CEFTRIAXONE SODIUM 2 G: 2 INJECTION, POWDER, FOR SOLUTION INTRAMUSCULAR; INTRAVENOUS at 04:13

## 2019-07-27 RX ADMIN — LEVETIRACETAM INJECTION 1000 MG: 10 INJECTION INTRAVENOUS at 11:12

## 2019-07-27 RX ADMIN — INSULIN DETEMIR 10 UNITS: 100 INJECTION, SOLUTION SUBCUTANEOUS at 21:29

## 2019-07-27 RX ADMIN — VALPROATE SODIUM 750 MG: 100 INJECTION, SOLUTION INTRAVENOUS at 00:04

## 2019-07-27 RX ADMIN — ACYCLOVIR SODIUM 440 MG: 50 INJECTION, SOLUTION INTRAVENOUS at 04:14

## 2019-07-27 RX ADMIN — VALPROATE SODIUM 750 MG: 100 INJECTION, SOLUTION INTRAVENOUS at 10:12

## 2019-07-27 RX ADMIN — NICARDIPINE HYDROCHLORIDE 5 MG/HR: 0.1 INJECTION, SOLUTION INTRAVENOUS at 11:30

## 2019-07-27 RX ADMIN — ROPINIROLE 0.5 MG: 0.5 TABLET, FILM COATED ORAL at 21:23

## 2019-07-27 RX ADMIN — PANTOPRAZOLE SODIUM 40 MG: 40 TABLET, DELAYED RELEASE ORAL at 05:26

## 2019-07-27 RX ADMIN — SODIUM CHLORIDE, PRESERVATIVE FREE 3 ML: 5 INJECTION INTRAVENOUS at 09:47

## 2019-07-27 RX ADMIN — INSULIN DETEMIR 10 UNITS: 100 INJECTION, SOLUTION SUBCUTANEOUS at 09:46

## 2019-07-27 RX ADMIN — NICARDIPINE HYDROCHLORIDE 5 MG/HR: 0.1 INJECTION, SOLUTION INTRAVENOUS at 15:36

## 2019-07-27 RX ADMIN — INSULIN LISPRO 4 UNITS: 100 INJECTION, SOLUTION INTRAVENOUS; SUBCUTANEOUS at 21:22

## 2019-07-27 RX ADMIN — INSULIN LISPRO 4 UNITS: 100 INJECTION, SOLUTION INTRAVENOUS; SUBCUTANEOUS at 17:35

## 2019-07-27 RX ADMIN — HYDROMORPHONE HYDROCHLORIDE 0.5 MG: 1 INJECTION, SOLUTION INTRAMUSCULAR; INTRAVENOUS; SUBCUTANEOUS at 01:43

## 2019-07-27 RX ADMIN — METOPROLOL SUCCINATE 50 MG: 50 TABLET, EXTENDED RELEASE ORAL at 09:44

## 2019-07-27 RX ADMIN — ROPINIROLE 0.5 MG: 0.5 TABLET, FILM COATED ORAL at 09:44

## 2019-07-27 RX ADMIN — ATORVASTATIN CALCIUM 40 MG: 40 TABLET, FILM COATED ORAL at 09:44

## 2019-07-27 RX ADMIN — FLUOXETINE HYDROCHLORIDE 20 MG: 20 CAPSULE ORAL at 09:44

## 2019-07-27 RX ADMIN — HYDRALAZINE HYDROCHLORIDE 25 MG: 25 TABLET ORAL at 05:26

## 2019-07-27 RX ADMIN — NICARDIPINE HYDROCHLORIDE 5 MG/HR: 0.1 INJECTION, SOLUTION INTRAVENOUS at 03:07

## 2019-07-28 LAB
BACTERIA SPEC AEROBE CULT: NORMAL
BACTERIA SPEC AEROBE CULT: NORMAL
GLUCOSE BLDC GLUCOMTR-MCNC: 113 MG/DL (ref 70–130)
GLUCOSE BLDC GLUCOMTR-MCNC: 149 MG/DL (ref 70–130)
GLUCOSE BLDC GLUCOMTR-MCNC: 259 MG/DL (ref 70–130)
GLUCOSE BLDC GLUCOMTR-MCNC: 297 MG/DL (ref 70–130)

## 2019-07-28 PROCEDURE — 99232 SBSQ HOSP IP/OBS MODERATE 35: CPT | Performed by: INTERNAL MEDICINE

## 2019-07-28 PROCEDURE — 99231 SBSQ HOSP IP/OBS SF/LOW 25: CPT | Performed by: PSYCHIATRY & NEUROLOGY

## 2019-07-28 PROCEDURE — 25010000002 CEFTRIAXONE PER 250 MG: Performed by: NURSE PRACTITIONER

## 2019-07-28 PROCEDURE — 82962 GLUCOSE BLOOD TEST: CPT

## 2019-07-28 PROCEDURE — 63710000001 INSULIN DETEMIR PER 5 UNITS: Performed by: PHYSICIAN ASSISTANT

## 2019-07-28 PROCEDURE — 25010000002 HYDRALAZINE PER 20 MG: Performed by: INTERNAL MEDICINE

## 2019-07-28 PROCEDURE — 25010000003 LEVETIRACETAM IN NACL 0.75% 1000 MG/100ML SOLUTION: Performed by: PSYCHIATRY & NEUROLOGY

## 2019-07-28 PROCEDURE — 97530 THERAPEUTIC ACTIVITIES: CPT

## 2019-07-28 RX ORDER — ACETAMINOPHEN 325 MG/1
650 TABLET ORAL EVERY 6 HOURS PRN
Status: DISCONTINUED | OUTPATIENT
Start: 2019-07-28 | End: 2019-08-01 | Stop reason: HOSPADM

## 2019-07-28 RX ORDER — HYDROCODONE BITARTRATE AND ACETAMINOPHEN 10; 325 MG/1; MG/1
1 TABLET ORAL ONCE AS NEEDED
Status: COMPLETED | OUTPATIENT
Start: 2019-07-28 | End: 2019-07-28

## 2019-07-28 RX ORDER — LEVETIRACETAM 500 MG/1
1000 TABLET ORAL EVERY 12 HOURS SCHEDULED
Status: DISCONTINUED | OUTPATIENT
Start: 2019-07-28 | End: 2019-07-30

## 2019-07-28 RX ADMIN — HYDRALAZINE HYDROCHLORIDE 25 MG: 25 TABLET ORAL at 05:42

## 2019-07-28 RX ADMIN — Medication 2 SPRAY: at 22:02

## 2019-07-28 RX ADMIN — LEVETIRACETAM INJECTION 1000 MG: 10 INJECTION INTRAVENOUS at 00:13

## 2019-07-28 RX ADMIN — INSULIN DETEMIR 10 UNITS: 100 INJECTION, SOLUTION SUBCUTANEOUS at 09:09

## 2019-07-28 RX ADMIN — HYDRALAZINE HYDROCHLORIDE 10 MG: 20 INJECTION INTRAMUSCULAR; INTRAVENOUS at 15:59

## 2019-07-28 RX ADMIN — DIVALPROEX SODIUM 750 MG: 250 TABLET, DELAYED RELEASE ORAL at 15:57

## 2019-07-28 RX ADMIN — ACETAMINOPHEN 650 MG: 325 TABLET, FILM COATED ORAL at 15:57

## 2019-07-28 RX ADMIN — HYDRALAZINE HYDROCHLORIDE 25 MG: 25 TABLET ORAL at 22:01

## 2019-07-28 RX ADMIN — ROPINIROLE 0.5 MG: 0.5 TABLET, FILM COATED ORAL at 22:01

## 2019-07-28 RX ADMIN — ATORVASTATIN CALCIUM 40 MG: 40 TABLET, FILM COATED ORAL at 09:12

## 2019-07-28 RX ADMIN — VALPROATE SODIUM 750 MG: 100 INJECTION, SOLUTION INTRAVENOUS at 00:13

## 2019-07-28 RX ADMIN — LEVETIRACETAM 1000 MG: 500 TABLET, FILM COATED ORAL at 22:01

## 2019-07-28 RX ADMIN — SODIUM CHLORIDE, PRESERVATIVE FREE 3 ML: 5 INJECTION INTRAVENOUS at 09:13

## 2019-07-28 RX ADMIN — FLUOXETINE HYDROCHLORIDE 20 MG: 20 CAPSULE ORAL at 09:12

## 2019-07-28 RX ADMIN — Medication 2 SPRAY: at 09:10

## 2019-07-28 RX ADMIN — INSULIN LISPRO 6 UNITS: 100 INJECTION, SOLUTION INTRAVENOUS; SUBCUTANEOUS at 22:02

## 2019-07-28 RX ADMIN — SODIUM CHLORIDE, PRESERVATIVE FREE 3 ML: 5 INJECTION INTRAVENOUS at 22:01

## 2019-07-28 RX ADMIN — CEFTRIAXONE SODIUM 2 G: 2 INJECTION, POWDER, FOR SOLUTION INTRAMUSCULAR; INTRAVENOUS at 03:40

## 2019-07-28 RX ADMIN — ROPINIROLE 0.5 MG: 0.5 TABLET, FILM COATED ORAL at 09:12

## 2019-07-28 RX ADMIN — VALPROATE SODIUM 750 MG: 100 INJECTION, SOLUTION INTRAVENOUS at 09:13

## 2019-07-28 RX ADMIN — CEFTRIAXONE SODIUM 2 G: 2 INJECTION, POWDER, FOR SOLUTION INTRAMUSCULAR; INTRAVENOUS at 15:57

## 2019-07-28 RX ADMIN — PANTOPRAZOLE SODIUM 40 MG: 40 TABLET, DELAYED RELEASE ORAL at 05:42

## 2019-07-28 RX ADMIN — LEVETIRACETAM INJECTION 1000 MG: 10 INJECTION INTRAVENOUS at 13:00

## 2019-07-28 RX ADMIN — HYDROCODONE BITARTRATE AND ACETAMINOPHEN 1 TABLET: 10; 325 TABLET ORAL at 22:56

## 2019-07-28 RX ADMIN — CLONIDINE HYDROCHLORIDE 0.1 MG: 0.1 TABLET ORAL at 18:34

## 2019-07-28 RX ADMIN — ASPIRIN 325 MG ORAL TABLET 325 MG: 325 PILL ORAL at 09:12

## 2019-07-28 RX ADMIN — INSULIN LISPRO 6 UNITS: 100 INJECTION, SOLUTION INTRAVENOUS; SUBCUTANEOUS at 13:00

## 2019-07-28 RX ADMIN — INSULIN DETEMIR 10 UNITS: 100 INJECTION, SOLUTION SUBCUTANEOUS at 22:03

## 2019-07-28 RX ADMIN — LEVETIRACETAM 1000 MG: 500 TABLET, FILM COATED ORAL at 15:59

## 2019-07-28 RX ADMIN — METOPROLOL SUCCINATE 50 MG: 50 TABLET, EXTENDED RELEASE ORAL at 09:12

## 2019-07-28 RX ADMIN — DIVALPROEX SODIUM 750 MG: 250 TABLET, DELAYED RELEASE ORAL at 22:02

## 2019-07-28 RX ADMIN — HYDRALAZINE HYDROCHLORIDE 25 MG: 25 TABLET ORAL at 13:00

## 2019-07-29 ENCOUNTER — APPOINTMENT (OUTPATIENT)
Dept: NEUROLOGY | Facility: HOSPITAL | Age: 59
End: 2019-07-29

## 2019-07-29 LAB
ALBUMIN SERPL-MCNC: 2.8 G/DL (ref 3.5–5.2)
ALBUMIN/GLOB SERPL: 1.2 G/DL
ALP SERPL-CCNC: 103 U/L (ref 39–117)
ALT SERPL W P-5'-P-CCNC: 8 U/L (ref 1–33)
ANION GAP SERPL CALCULATED.3IONS-SCNC: 11 MMOL/L (ref 5–15)
AST SERPL-CCNC: 11 U/L (ref 1–32)
BASOPHILS # BLD AUTO: 0.03 10*3/MM3 (ref 0–0.2)
BASOPHILS NFR BLD AUTO: 0.7 % (ref 0–1.5)
BILIRUB SERPL-MCNC: <0.2 MG/DL (ref 0.2–1.2)
BUN BLD-MCNC: 31 MG/DL (ref 8–23)
BUN/CREAT SERPL: 29.5 (ref 7–25)
CALCIUM SPEC-SCNC: 8 MG/DL (ref 8.6–10.5)
CHLORIDE SERPL-SCNC: 108 MMOL/L (ref 98–107)
CO2 SERPL-SCNC: 25 MMOL/L (ref 22–29)
CREAT BLD-MCNC: 1.05 MG/DL (ref 0.57–1)
CRP SERPL-MCNC: 0.09 MG/DL (ref 0–0.5)
DEPRECATED RDW RBC AUTO: 51.3 FL (ref 37–54)
EOSINOPHIL # BLD AUTO: 0.03 10*3/MM3 (ref 0–0.4)
EOSINOPHIL NFR BLD AUTO: 0.7 % (ref 0.3–6.2)
ERYTHROCYTE [DISTWIDTH] IN BLOOD BY AUTOMATED COUNT: 15.3 % (ref 12.3–15.4)
ERYTHROCYTE [SEDIMENTATION RATE] IN BLOOD: 10 MM/HR (ref 0–30)
GFR SERPL CREATININE-BSD FRML MDRD: 53 ML/MIN/1.73
GLOBULIN UR ELPH-MCNC: 2.4 GM/DL
GLUCOSE BLD-MCNC: 77 MG/DL (ref 65–99)
GLUCOSE BLDC GLUCOMTR-MCNC: 157 MG/DL (ref 70–130)
GLUCOSE BLDC GLUCOMTR-MCNC: 212 MG/DL (ref 70–130)
GLUCOSE BLDC GLUCOMTR-MCNC: 243 MG/DL (ref 70–130)
GLUCOSE BLDC GLUCOMTR-MCNC: 58 MG/DL (ref 70–130)
GLUCOSE BLDC GLUCOMTR-MCNC: 60 MG/DL (ref 70–130)
GLUCOSE BLDC GLUCOMTR-MCNC: 93 MG/DL (ref 70–130)
GLUCOSE BLDC GLUCOMTR-MCNC: 97 MG/DL (ref 70–130)
HCT VFR BLD AUTO: 29.8 % (ref 34–46.6)
HGB BLD-MCNC: 9.7 G/DL (ref 12–15.9)
IMM GRANULOCYTES # BLD AUTO: 0.02 10*3/MM3 (ref 0–0.05)
IMM GRANULOCYTES NFR BLD AUTO: 0.5 % (ref 0–0.5)
LYMPHOCYTES # BLD AUTO: 1.3 10*3/MM3 (ref 0.7–3.1)
LYMPHOCYTES NFR BLD AUTO: 30.2 % (ref 19.6–45.3)
MCH RBC QN AUTO: 29.4 PG (ref 26.6–33)
MCHC RBC AUTO-ENTMCNC: 32.6 G/DL (ref 31.5–35.7)
MCV RBC AUTO: 90.3 FL (ref 79–97)
MONOCYTES # BLD AUTO: 0.25 10*3/MM3 (ref 0.1–0.9)
MONOCYTES NFR BLD AUTO: 5.8 % (ref 5–12)
NEUTROPHILS # BLD AUTO: 2.68 10*3/MM3 (ref 1.7–7)
NEUTROPHILS NFR BLD AUTO: 62.1 % (ref 42.7–76)
NRBC BLD AUTO-RTO: 0 /100 WBC (ref 0–0.2)
PLATELET # BLD AUTO: 126 10*3/MM3 (ref 140–450)
PMV BLD AUTO: 10.1 FL (ref 6–12)
POTASSIUM BLD-SCNC: 3.6 MMOL/L (ref 3.5–5.2)
PROT SERPL-MCNC: 5.2 G/DL (ref 6–8.5)
RBC # BLD AUTO: 3.3 10*6/MM3 (ref 3.77–5.28)
SODIUM BLD-SCNC: 144 MMOL/L (ref 136–145)
VALPROATE SERPL-MCNC: 94.6 MCG/ML (ref 50–125)
WBC NRBC COR # BLD: 4.31 10*3/MM3 (ref 3.4–10.8)

## 2019-07-29 PROCEDURE — 80053 COMPREHEN METABOLIC PANEL: CPT | Performed by: NURSE PRACTITIONER

## 2019-07-29 PROCEDURE — 85652 RBC SED RATE AUTOMATED: CPT | Performed by: NURSE PRACTITIONER

## 2019-07-29 PROCEDURE — 80164 ASSAY DIPROPYLACETIC ACD TOT: CPT | Performed by: PSYCHIATRY & NEUROLOGY

## 2019-07-29 PROCEDURE — 63710000001 INSULIN DETEMIR PER 5 UNITS: Performed by: PHYSICIAN ASSISTANT

## 2019-07-29 PROCEDURE — 82962 GLUCOSE BLOOD TEST: CPT

## 2019-07-29 PROCEDURE — 97535 SELF CARE MNGMENT TRAINING: CPT | Performed by: OCCUPATIONAL THERAPIST

## 2019-07-29 PROCEDURE — 95819 EEG AWAKE AND ASLEEP: CPT

## 2019-07-29 PROCEDURE — 86140 C-REACTIVE PROTEIN: CPT | Performed by: NURSE PRACTITIONER

## 2019-07-29 PROCEDURE — 99232 SBSQ HOSP IP/OBS MODERATE 35: CPT | Performed by: INTERNAL MEDICINE

## 2019-07-29 PROCEDURE — 25010000002 CEFTRIAXONE PER 250 MG: Performed by: NURSE PRACTITIONER

## 2019-07-29 PROCEDURE — 97530 THERAPEUTIC ACTIVITIES: CPT | Performed by: OCCUPATIONAL THERAPIST

## 2019-07-29 PROCEDURE — 85025 COMPLETE CBC W/AUTO DIFF WBC: CPT | Performed by: NURSE PRACTITIONER

## 2019-07-29 PROCEDURE — 99232 SBSQ HOSP IP/OBS MODERATE 35: CPT | Performed by: PSYCHIATRY & NEUROLOGY

## 2019-07-29 RX ORDER — CLOPIDOGREL BISULFATE 75 MG/1
75 TABLET ORAL DAILY
Status: DISCONTINUED | OUTPATIENT
Start: 2019-07-29 | End: 2019-08-01 | Stop reason: HOSPADM

## 2019-07-29 RX ORDER — LISINOPRIL 20 MG/1
20 TABLET ORAL
Status: DISCONTINUED | OUTPATIENT
Start: 2019-07-29 | End: 2019-07-30

## 2019-07-29 RX ORDER — POTASSIUM CHLORIDE 750 MG/1
20 CAPSULE, EXTENDED RELEASE ORAL 2 TIMES DAILY WITH MEALS
Status: COMPLETED | OUTPATIENT
Start: 2019-07-29 | End: 2019-07-31

## 2019-07-29 RX ADMIN — HYDRALAZINE HYDROCHLORIDE 25 MG: 25 TABLET ORAL at 14:11

## 2019-07-29 RX ADMIN — DIVALPROEX SODIUM 750 MG: 250 TABLET, DELAYED RELEASE ORAL at 09:20

## 2019-07-29 RX ADMIN — METOPROLOL SUCCINATE 50 MG: 50 TABLET, EXTENDED RELEASE ORAL at 09:20

## 2019-07-29 RX ADMIN — POTASSIUM CHLORIDE 20 MEQ: 750 CAPSULE, EXTENDED RELEASE ORAL at 11:51

## 2019-07-29 RX ADMIN — CEFTRIAXONE SODIUM 2 G: 2 INJECTION, POWDER, FOR SOLUTION INTRAMUSCULAR; INTRAVENOUS at 15:40

## 2019-07-29 RX ADMIN — POTASSIUM CHLORIDE 20 MEQ: 750 CAPSULE, EXTENDED RELEASE ORAL at 22:19

## 2019-07-29 RX ADMIN — ROPINIROLE 0.5 MG: 0.5 TABLET, FILM COATED ORAL at 09:20

## 2019-07-29 RX ADMIN — CEFTRIAXONE SODIUM 2 G: 2 INJECTION, POWDER, FOR SOLUTION INTRAMUSCULAR; INTRAVENOUS at 04:09

## 2019-07-29 RX ADMIN — Medication 2 SPRAY: at 22:19

## 2019-07-29 RX ADMIN — LISINOPRIL 20 MG: 20 TABLET ORAL at 09:19

## 2019-07-29 RX ADMIN — DIVALPROEX SODIUM 750 MG: 250 TABLET, DELAYED RELEASE ORAL at 04:10

## 2019-07-29 RX ADMIN — HYDRALAZINE HYDROCHLORIDE 25 MG: 25 TABLET ORAL at 05:32

## 2019-07-29 RX ADMIN — HYDRALAZINE HYDROCHLORIDE 25 MG: 25 TABLET ORAL at 22:15

## 2019-07-29 RX ADMIN — LEVETIRACETAM 1000 MG: 500 TABLET, FILM COATED ORAL at 09:19

## 2019-07-29 RX ADMIN — ATORVASTATIN CALCIUM 40 MG: 40 TABLET, FILM COATED ORAL at 09:20

## 2019-07-29 RX ADMIN — DIVALPROEX SODIUM 750 MG: 250 TABLET, DELAYED RELEASE ORAL at 22:19

## 2019-07-29 RX ADMIN — FLUOXETINE HYDROCHLORIDE 20 MG: 20 CAPSULE ORAL at 09:20

## 2019-07-29 RX ADMIN — INSULIN LISPRO 2 UNITS: 100 INJECTION, SOLUTION INTRAVENOUS; SUBCUTANEOUS at 11:51

## 2019-07-29 RX ADMIN — ASPIRIN 325 MG ORAL TABLET 325 MG: 325 PILL ORAL at 09:20

## 2019-07-29 RX ADMIN — SODIUM CHLORIDE, PRESERVATIVE FREE 3 ML: 5 INJECTION INTRAVENOUS at 22:22

## 2019-07-29 RX ADMIN — INSULIN DETEMIR 10 UNITS: 100 INJECTION, SOLUTION SUBCUTANEOUS at 09:33

## 2019-07-29 RX ADMIN — CLOPIDOGREL BISULFATE 75 MG: 75 TABLET ORAL at 11:51

## 2019-07-29 RX ADMIN — Medication 2 SPRAY: at 09:20

## 2019-07-29 RX ADMIN — ROPINIROLE 0.5 MG: 0.5 TABLET, FILM COATED ORAL at 22:15

## 2019-07-29 RX ADMIN — LEVETIRACETAM 1000 MG: 500 TABLET, FILM COATED ORAL at 22:19

## 2019-07-29 RX ADMIN — DEXTROSE 50 % IN WATER (D50W) INTRAVENOUS SYRINGE 25 G: at 17:35

## 2019-07-29 RX ADMIN — DIVALPROEX SODIUM 750 MG: 250 TABLET, DELAYED RELEASE ORAL at 15:50

## 2019-07-29 RX ADMIN — PANTOPRAZOLE SODIUM 40 MG: 40 TABLET, DELAYED RELEASE ORAL at 05:32

## 2019-07-29 RX ADMIN — Medication 15 G: at 17:07

## 2019-07-29 RX ADMIN — CLONIDINE HYDROCHLORIDE 0.1 MG: 0.1 TABLET ORAL at 06:29

## 2019-07-30 LAB
ALBUMIN SERPL-MCNC: 2.8 G/DL (ref 3.5–5.2)
ALBUMIN/GLOB SERPL: 1.1 G/DL
ALP SERPL-CCNC: 107 U/L (ref 39–117)
ALT SERPL W P-5'-P-CCNC: 7 U/L (ref 1–33)
ANION GAP SERPL CALCULATED.3IONS-SCNC: 12 MMOL/L (ref 5–15)
AST SERPL-CCNC: 10 U/L (ref 1–32)
BACTERIA SPEC AEROBE CULT: NORMAL
BASOPHILS # BLD AUTO: 0.01 10*3/MM3 (ref 0–0.2)
BASOPHILS NFR BLD AUTO: 0.1 % (ref 0–1.5)
BILIRUB SERPL-MCNC: <0.2 MG/DL (ref 0.2–1.2)
BUN BLD-MCNC: 29 MG/DL (ref 8–23)
BUN/CREAT SERPL: 27.9 (ref 7–25)
CALCIUM SPEC-SCNC: 8.1 MG/DL (ref 8.6–10.5)
CHLORIDE SERPL-SCNC: 107 MMOL/L (ref 98–107)
CO2 SERPL-SCNC: 24 MMOL/L (ref 22–29)
CREAT BLD-MCNC: 1.04 MG/DL (ref 0.57–1)
CRP SERPL-MCNC: 0.15 MG/DL (ref 0–0.5)
DEPRECATED RDW RBC AUTO: 51.2 FL (ref 37–54)
EOSINOPHIL # BLD AUTO: 0.06 10*3/MM3 (ref 0–0.4)
EOSINOPHIL NFR BLD AUTO: 0.9 % (ref 0.3–6.2)
ERYTHROCYTE [DISTWIDTH] IN BLOOD BY AUTOMATED COUNT: 15.6 % (ref 12.3–15.4)
ERYTHROCYTE [SEDIMENTATION RATE] IN BLOOD: 13 MM/HR (ref 0–30)
GFR SERPL CREATININE-BSD FRML MDRD: 54 ML/MIN/1.73
GLOBULIN UR ELPH-MCNC: 2.5 GM/DL
GLUCOSE BLD-MCNC: 84 MG/DL (ref 65–99)
GLUCOSE BLDC GLUCOMTR-MCNC: 135 MG/DL (ref 70–130)
GLUCOSE BLDC GLUCOMTR-MCNC: 149 MG/DL (ref 70–130)
GLUCOSE BLDC GLUCOMTR-MCNC: 186 MG/DL (ref 70–130)
GLUCOSE BLDC GLUCOMTR-MCNC: 211 MG/DL (ref 70–130)
GLUCOSE BLDC GLUCOMTR-MCNC: 216 MG/DL (ref 70–130)
GLUCOSE BLDC GLUCOMTR-MCNC: 82 MG/DL (ref 70–130)
HCT VFR BLD AUTO: 30.8 % (ref 34–46.6)
HGB BLD-MCNC: 9.8 G/DL (ref 12–15.9)
IMM GRANULOCYTES # BLD AUTO: 0.03 10*3/MM3 (ref 0–0.05)
IMM GRANULOCYTES NFR BLD AUTO: 0.4 % (ref 0–0.5)
LYMPHOCYTES # BLD AUTO: 1.35 10*3/MM3 (ref 0.7–3.1)
LYMPHOCYTES NFR BLD AUTO: 20.1 % (ref 19.6–45.3)
MAGNESIUM SERPL-MCNC: 1.9 MG/DL (ref 1.6–2.4)
MCH RBC QN AUTO: 28.9 PG (ref 26.6–33)
MCHC RBC AUTO-ENTMCNC: 31.8 G/DL (ref 31.5–35.7)
MCV RBC AUTO: 90.9 FL (ref 79–97)
MONOCYTES # BLD AUTO: 0.42 10*3/MM3 (ref 0.1–0.9)
MONOCYTES NFR BLD AUTO: 6.2 % (ref 5–12)
NEUTROPHILS # BLD AUTO: 4.86 10*3/MM3 (ref 1.7–7)
NEUTROPHILS NFR BLD AUTO: 72.3 % (ref 42.7–76)
NRBC BLD AUTO-RTO: 0 /100 WBC (ref 0–0.2)
PHOSPHATE SERPL-MCNC: 2.9 MG/DL (ref 2.5–4.5)
PLATELET # BLD AUTO: 140 10*3/MM3 (ref 140–450)
PMV BLD AUTO: 10.7 FL (ref 6–12)
POTASSIUM BLD-SCNC: 3.6 MMOL/L (ref 3.5–5.2)
PROT SERPL-MCNC: 5.3 G/DL (ref 6–8.5)
RBC # BLD AUTO: 3.39 10*6/MM3 (ref 3.77–5.28)
SODIUM BLD-SCNC: 143 MMOL/L (ref 136–145)
TROPONIN T SERPL-MCNC: <0.01 NG/ML (ref 0–0.03)
WBC NRBC COR # BLD: 6.73 10*3/MM3 (ref 3.4–10.8)

## 2019-07-30 PROCEDURE — 97110 THERAPEUTIC EXERCISES: CPT

## 2019-07-30 PROCEDURE — 25010000002 CEFTRIAXONE PER 250 MG: Performed by: NURSE PRACTITIONER

## 2019-07-30 PROCEDURE — 25010000002 HYDRALAZINE PER 20 MG: Performed by: NURSE PRACTITIONER

## 2019-07-30 PROCEDURE — 99232 SBSQ HOSP IP/OBS MODERATE 35: CPT | Performed by: INTERNAL MEDICINE

## 2019-07-30 PROCEDURE — 80053 COMPREHEN METABOLIC PANEL: CPT | Performed by: NURSE PRACTITIONER

## 2019-07-30 PROCEDURE — 84484 ASSAY OF TROPONIN QUANT: CPT | Performed by: NURSE PRACTITIONER

## 2019-07-30 PROCEDURE — 93005 ELECTROCARDIOGRAM TRACING: CPT | Performed by: INTERNAL MEDICINE

## 2019-07-30 PROCEDURE — 86140 C-REACTIVE PROTEIN: CPT | Performed by: NURSE PRACTITIONER

## 2019-07-30 PROCEDURE — 85652 RBC SED RATE AUTOMATED: CPT | Performed by: NURSE PRACTITIONER

## 2019-07-30 PROCEDURE — 97530 THERAPEUTIC ACTIVITIES: CPT

## 2019-07-30 PROCEDURE — 84100 ASSAY OF PHOSPHORUS: CPT | Performed by: INTERNAL MEDICINE

## 2019-07-30 PROCEDURE — 25010000002 ONDANSETRON PER 1 MG: Performed by: PHYSICIAN ASSISTANT

## 2019-07-30 PROCEDURE — 85025 COMPLETE CBC W/AUTO DIFF WBC: CPT | Performed by: NURSE PRACTITIONER

## 2019-07-30 PROCEDURE — 92507 TX SP LANG VOICE COMM INDIV: CPT

## 2019-07-30 PROCEDURE — 25010000002 HYDRALAZINE PER 20 MG: Performed by: INTERNAL MEDICINE

## 2019-07-30 PROCEDURE — 83735 ASSAY OF MAGNESIUM: CPT | Performed by: INTERNAL MEDICINE

## 2019-07-30 PROCEDURE — 93010 ELECTROCARDIOGRAM REPORT: CPT | Performed by: INTERNAL MEDICINE

## 2019-07-30 PROCEDURE — 82962 GLUCOSE BLOOD TEST: CPT

## 2019-07-30 PROCEDURE — 99232 SBSQ HOSP IP/OBS MODERATE 35: CPT | Performed by: PSYCHIATRY & NEUROLOGY

## 2019-07-30 PROCEDURE — G0108 DIAB MANAGE TRN  PER INDIV: HCPCS | Performed by: REGISTERED NURSE

## 2019-07-30 RX ORDER — NITROGLYCERIN 0.4 MG/1
0.4 TABLET SUBLINGUAL
Status: DISCONTINUED | OUTPATIENT
Start: 2019-07-30 | End: 2019-08-01 | Stop reason: HOSPADM

## 2019-07-30 RX ORDER — LEVETIRACETAM 750 MG/1
750 TABLET ORAL EVERY 12 HOURS SCHEDULED
Status: DISCONTINUED | OUTPATIENT
Start: 2019-07-30 | End: 2019-08-01 | Stop reason: HOSPADM

## 2019-07-30 RX ORDER — NITROGLYCERIN 0.4 MG/1
TABLET SUBLINGUAL
Status: COMPLETED
Start: 2019-07-30 | End: 2019-07-30

## 2019-07-30 RX ORDER — HYDRALAZINE HYDROCHLORIDE 20 MG/ML
10 INJECTION INTRAMUSCULAR; INTRAVENOUS ONCE
Status: COMPLETED | OUTPATIENT
Start: 2019-07-30 | End: 2019-07-30

## 2019-07-30 RX ADMIN — HYDRALAZINE HYDROCHLORIDE 10 MG: 20 INJECTION INTRAMUSCULAR; INTRAVENOUS at 18:13

## 2019-07-30 RX ADMIN — LEVETIRACETAM 1000 MG: 500 TABLET, FILM COATED ORAL at 08:47

## 2019-07-30 RX ADMIN — CLONIDINE HYDROCHLORIDE 0.1 MG: 0.1 TABLET ORAL at 06:49

## 2019-07-30 RX ADMIN — ASPIRIN 325 MG ORAL TABLET 325 MG: 325 PILL ORAL at 08:47

## 2019-07-30 RX ADMIN — DIVALPROEX SODIUM 750 MG: 250 TABLET, DELAYED RELEASE ORAL at 03:59

## 2019-07-30 RX ADMIN — HYDRALAZINE HYDROCHLORIDE 25 MG: 25 TABLET ORAL at 20:55

## 2019-07-30 RX ADMIN — SODIUM CHLORIDE, PRESERVATIVE FREE 3 ML: 5 INJECTION INTRAVENOUS at 21:01

## 2019-07-30 RX ADMIN — POTASSIUM CHLORIDE 20 MEQ: 750 CAPSULE, EXTENDED RELEASE ORAL at 18:13

## 2019-07-30 RX ADMIN — SODIUM CHLORIDE, PRESERVATIVE FREE 3 ML: 5 INJECTION INTRAVENOUS at 08:48

## 2019-07-30 RX ADMIN — ONDANSETRON 4 MG: 2 INJECTION INTRAMUSCULAR; INTRAVENOUS at 20:24

## 2019-07-30 RX ADMIN — LEVETIRACETAM 750 MG: 750 TABLET, FILM COATED ORAL at 20:48

## 2019-07-30 RX ADMIN — HYDRALAZINE HYDROCHLORIDE 10 MG: 20 INJECTION INTRAMUSCULAR; INTRAVENOUS at 21:27

## 2019-07-30 RX ADMIN — ROPINIROLE 0.5 MG: 0.5 TABLET, FILM COATED ORAL at 08:47

## 2019-07-30 RX ADMIN — NITROGLYCERIN 0.4 MG: 0.4 TABLET SUBLINGUAL at 21:07

## 2019-07-30 RX ADMIN — POTASSIUM CHLORIDE 20 MEQ: 750 CAPSULE, EXTENDED RELEASE ORAL at 08:47

## 2019-07-30 RX ADMIN — CEFTRIAXONE SODIUM 2 G: 2 INJECTION, POWDER, FOR SOLUTION INTRAMUSCULAR; INTRAVENOUS at 03:59

## 2019-07-30 RX ADMIN — ROPINIROLE 0.5 MG: 0.5 TABLET, FILM COATED ORAL at 20:47

## 2019-07-30 RX ADMIN — METOPROLOL SUCCINATE 50 MG: 50 TABLET, EXTENDED RELEASE ORAL at 08:47

## 2019-07-30 RX ADMIN — Medication 2 SPRAY: at 08:47

## 2019-07-30 RX ADMIN — LISINOPRIL 20 MG: 20 TABLET ORAL at 08:47

## 2019-07-30 RX ADMIN — PANTOPRAZOLE SODIUM 40 MG: 40 TABLET, DELAYED RELEASE ORAL at 05:42

## 2019-07-30 RX ADMIN — DIVALPROEX SODIUM 750 MG: 250 TABLET, DELAYED RELEASE ORAL at 10:52

## 2019-07-30 RX ADMIN — ATORVASTATIN CALCIUM 40 MG: 40 TABLET, FILM COATED ORAL at 08:47

## 2019-07-30 RX ADMIN — HYDRALAZINE HYDROCHLORIDE 25 MG: 25 TABLET ORAL at 05:44

## 2019-07-30 RX ADMIN — DIVALPROEX SODIUM 750 MG: 250 TABLET, DELAYED RELEASE ORAL at 20:54

## 2019-07-30 RX ADMIN — FLUOXETINE HYDROCHLORIDE 20 MG: 20 CAPSULE ORAL at 08:47

## 2019-07-30 RX ADMIN — CEFTRIAXONE SODIUM 2 G: 2 INJECTION, POWDER, FOR SOLUTION INTRAMUSCULAR; INTRAVENOUS at 15:29

## 2019-07-30 RX ADMIN — HYDRALAZINE HYDROCHLORIDE 25 MG: 25 TABLET ORAL at 15:29

## 2019-07-30 RX ADMIN — CLONIDINE HYDROCHLORIDE 0.1 MG: 0.1 TABLET ORAL at 20:58

## 2019-07-30 RX ADMIN — CLOPIDOGREL BISULFATE 75 MG: 75 TABLET ORAL at 08:47

## 2019-07-30 RX ADMIN — DIVALPROEX SODIUM 750 MG: 250 TABLET, DELAYED RELEASE ORAL at 15:29

## 2019-07-31 LAB
GLUCOSE BLDC GLUCOMTR-MCNC: 153 MG/DL (ref 70–130)
GLUCOSE BLDC GLUCOMTR-MCNC: 154 MG/DL (ref 70–130)
GLUCOSE BLDC GLUCOMTR-MCNC: 207 MG/DL (ref 70–130)
GLUCOSE BLDC GLUCOMTR-MCNC: 217 MG/DL (ref 70–130)

## 2019-07-31 PROCEDURE — 25010000002 HYDRALAZINE PER 20 MG: Performed by: INTERNAL MEDICINE

## 2019-07-31 PROCEDURE — 99232 SBSQ HOSP IP/OBS MODERATE 35: CPT | Performed by: PSYCHIATRY & NEUROLOGY

## 2019-07-31 PROCEDURE — 82962 GLUCOSE BLOOD TEST: CPT

## 2019-07-31 PROCEDURE — 25010000002 CEFTRIAXONE PER 250 MG: Performed by: NURSE PRACTITIONER

## 2019-07-31 PROCEDURE — 25010000002 ONDANSETRON PER 1 MG: Performed by: PHYSICIAN ASSISTANT

## 2019-07-31 PROCEDURE — 99232 SBSQ HOSP IP/OBS MODERATE 35: CPT | Performed by: INTERNAL MEDICINE

## 2019-07-31 PROCEDURE — 97110 THERAPEUTIC EXERCISES: CPT

## 2019-07-31 PROCEDURE — 97530 THERAPEUTIC ACTIVITIES: CPT

## 2019-07-31 RX ORDER — L.ACID,PARA/B.BIFIDUM/S.THERM 8B CELL
1 CAPSULE ORAL DAILY
Status: DISCONTINUED | OUTPATIENT
Start: 2019-07-31 | End: 2019-08-01 | Stop reason: HOSPADM

## 2019-07-31 RX ORDER — ECHINACEA PURPUREA EXTRACT 125 MG
1 TABLET ORAL AS NEEDED
Status: DISCONTINUED | OUTPATIENT
Start: 2019-07-31 | End: 2019-08-01 | Stop reason: HOSPADM

## 2019-07-31 RX ORDER — DIVALPROEX SODIUM 500 MG/1
1000 TABLET, DELAYED RELEASE ORAL EVERY 12 HOURS SCHEDULED
Status: DISCONTINUED | OUTPATIENT
Start: 2019-07-31 | End: 2019-08-01 | Stop reason: HOSPADM

## 2019-07-31 RX ADMIN — METOPROLOL SUCCINATE 50 MG: 50 TABLET, EXTENDED RELEASE ORAL at 10:00

## 2019-07-31 RX ADMIN — CEFTRIAXONE SODIUM 2 G: 2 INJECTION, POWDER, FOR SOLUTION INTRAMUSCULAR; INTRAVENOUS at 16:57

## 2019-07-31 RX ADMIN — ATORVASTATIN CALCIUM 40 MG: 40 TABLET, FILM COATED ORAL at 09:59

## 2019-07-31 RX ADMIN — LEVETIRACETAM 750 MG: 750 TABLET, FILM COATED ORAL at 10:00

## 2019-07-31 RX ADMIN — LISINOPRIL 30 MG: 20 TABLET ORAL at 09:59

## 2019-07-31 RX ADMIN — Medication 1 CAPSULE: at 14:20

## 2019-07-31 RX ADMIN — SODIUM CHLORIDE, PRESERVATIVE FREE 3 ML: 5 INJECTION INTRAVENOUS at 21:09

## 2019-07-31 RX ADMIN — ONDANSETRON 4 MG: 2 INJECTION INTRAMUSCULAR; INTRAVENOUS at 03:57

## 2019-07-31 RX ADMIN — POTASSIUM CHLORIDE 20 MEQ: 750 CAPSULE, EXTENDED RELEASE ORAL at 18:07

## 2019-07-31 RX ADMIN — ROPINIROLE 0.5 MG: 0.5 TABLET, FILM COATED ORAL at 09:59

## 2019-07-31 RX ADMIN — ROPINIROLE 0.5 MG: 0.5 TABLET, FILM COATED ORAL at 21:08

## 2019-07-31 RX ADMIN — DIVALPROEX SODIUM 750 MG: 250 TABLET, DELAYED RELEASE ORAL at 05:35

## 2019-07-31 RX ADMIN — POTASSIUM CHLORIDE 20 MEQ: 750 CAPSULE, EXTENDED RELEASE ORAL at 10:00

## 2019-07-31 RX ADMIN — NICARDIPINE HYDROCHLORIDE 5 MG/HR: 0.1 INJECTION, SOLUTION INTRAVENOUS at 03:56

## 2019-07-31 RX ADMIN — ASPIRIN 325 MG ORAL TABLET 325 MG: 325 PILL ORAL at 10:00

## 2019-07-31 RX ADMIN — HYDRALAZINE HYDROCHLORIDE 25 MG: 25 TABLET ORAL at 14:17

## 2019-07-31 RX ADMIN — CLOPIDOGREL BISULFATE 75 MG: 75 TABLET ORAL at 10:00

## 2019-07-31 RX ADMIN — PANTOPRAZOLE SODIUM 40 MG: 40 TABLET, DELAYED RELEASE ORAL at 05:35

## 2019-07-31 RX ADMIN — FLUOXETINE HYDROCHLORIDE 20 MG: 20 CAPSULE ORAL at 10:00

## 2019-07-31 RX ADMIN — LEVETIRACETAM 750 MG: 750 TABLET, FILM COATED ORAL at 21:09

## 2019-07-31 RX ADMIN — DIVALPROEX SODIUM 1000 MG: 500 TABLET, DELAYED RELEASE ORAL at 21:09

## 2019-07-31 RX ADMIN — CLONIDINE HYDROCHLORIDE 0.1 MG: 0.1 TABLET ORAL at 12:36

## 2019-07-31 RX ADMIN — CEFTRIAXONE SODIUM 2 G: 2 INJECTION, POWDER, FOR SOLUTION INTRAMUSCULAR; INTRAVENOUS at 03:50

## 2019-07-31 RX ADMIN — HYDRALAZINE HYDROCHLORIDE 10 MG: 20 INJECTION INTRAMUSCULAR; INTRAVENOUS at 18:18

## 2019-07-31 RX ADMIN — HYDRALAZINE HYDROCHLORIDE 25 MG: 25 TABLET ORAL at 05:37

## 2019-07-31 RX ADMIN — HYDRALAZINE HYDROCHLORIDE 25 MG: 25 TABLET ORAL at 21:11

## 2019-08-01 VITALS
OXYGEN SATURATION: 98 % | HEART RATE: 66 BPM | WEIGHT: 114.7 LBS | HEIGHT: 64 IN | SYSTOLIC BLOOD PRESSURE: 164 MMHG | DIASTOLIC BLOOD PRESSURE: 71 MMHG | RESPIRATION RATE: 16 BRPM | TEMPERATURE: 98 F | BODY MASS INDEX: 19.58 KG/M2

## 2019-08-01 LAB
GLUCOSE BLDC GLUCOMTR-MCNC: 125 MG/DL (ref 70–130)
GLUCOSE BLDC GLUCOMTR-MCNC: 173 MG/DL (ref 70–130)
GLUCOSE BLDC GLUCOMTR-MCNC: 185 MG/DL (ref 70–130)
GLUCOSE BLDC GLUCOMTR-MCNC: 260 MG/DL (ref 70–130)

## 2019-08-01 PROCEDURE — 25010000002 CEFTRIAXONE PER 250 MG: Performed by: NURSE PRACTITIONER

## 2019-08-01 PROCEDURE — 97110 THERAPEUTIC EXERCISES: CPT

## 2019-08-01 PROCEDURE — 97530 THERAPEUTIC ACTIVITIES: CPT

## 2019-08-01 PROCEDURE — 82962 GLUCOSE BLOOD TEST: CPT

## 2019-08-01 PROCEDURE — 99239 HOSP IP/OBS DSCHRG MGMT >30: CPT | Performed by: NURSE PRACTITIONER

## 2019-08-01 RX ORDER — LISINOPRIL 40 MG/1
40 TABLET ORAL
Status: DISCONTINUED | OUTPATIENT
Start: 2019-08-02 | End: 2019-08-01 | Stop reason: HOSPADM

## 2019-08-01 RX ORDER — LEVETIRACETAM 750 MG/1
750 TABLET ORAL EVERY 12 HOURS SCHEDULED
Qty: 60 TABLET | Refills: 0 | Status: ON HOLD | OUTPATIENT
Start: 2019-08-01 | End: 2021-11-05

## 2019-08-01 RX ORDER — GABAPENTIN 800 MG/1
800 TABLET ORAL EVERY 12 HOURS
Status: ON HOLD
Start: 2019-08-01 | End: 2019-08-06

## 2019-08-01 RX ORDER — LISINOPRIL 40 MG/1
40 TABLET ORAL
Qty: 30 TABLET | Refills: 0 | Status: ON HOLD | OUTPATIENT
Start: 2019-08-02 | End: 2021-11-05

## 2019-08-01 RX ORDER — CEPHALEXIN 500 MG/1
500 TABLET ORAL DAILY
Qty: 30 TABLET | Refills: 0 | Status: ON HOLD | OUTPATIENT
Start: 2019-08-01 | End: 2021-11-05

## 2019-08-01 RX ORDER — LISINOPRIL 10 MG/1
10 TABLET ORAL ONCE
Status: COMPLETED | OUTPATIENT
Start: 2019-08-01 | End: 2019-08-01

## 2019-08-01 RX ORDER — DIVALPROEX SODIUM 500 MG/1
1000 TABLET, DELAYED RELEASE ORAL EVERY 12 HOURS SCHEDULED
Qty: 120 TABLET | Refills: 0 | Status: SHIPPED | OUTPATIENT
Start: 2019-08-01 | End: 2019-08-08 | Stop reason: HOSPADM

## 2019-08-01 RX ADMIN — FLUOXETINE HYDROCHLORIDE 20 MG: 20 CAPSULE ORAL at 09:14

## 2019-08-01 RX ADMIN — ASPIRIN 325 MG ORAL TABLET 325 MG: 325 PILL ORAL at 09:14

## 2019-08-01 RX ADMIN — DIVALPROEX SODIUM 1000 MG: 500 TABLET, DELAYED RELEASE ORAL at 09:15

## 2019-08-01 RX ADMIN — METOPROLOL SUCCINATE 50 MG: 50 TABLET, EXTENDED RELEASE ORAL at 09:14

## 2019-08-01 RX ADMIN — HYDRALAZINE HYDROCHLORIDE 25 MG: 25 TABLET ORAL at 06:00

## 2019-08-01 RX ADMIN — CEFTRIAXONE SODIUM 2 G: 2 INJECTION, POWDER, FOR SOLUTION INTRAMUSCULAR; INTRAVENOUS at 04:03

## 2019-08-01 RX ADMIN — Medication 1 CAPSULE: at 09:14

## 2019-08-01 RX ADMIN — CLONIDINE HYDROCHLORIDE 0.1 MG: 0.1 TABLET ORAL at 01:05

## 2019-08-01 RX ADMIN — LISINOPRIL 10 MG: 10 TABLET ORAL at 12:23

## 2019-08-01 RX ADMIN — LEVETIRACETAM 750 MG: 750 TABLET, FILM COATED ORAL at 09:14

## 2019-08-01 RX ADMIN — SODIUM CHLORIDE, PRESERVATIVE FREE 3 ML: 5 INJECTION INTRAVENOUS at 09:15

## 2019-08-01 RX ADMIN — ATORVASTATIN CALCIUM 40 MG: 40 TABLET, FILM COATED ORAL at 09:14

## 2019-08-01 RX ADMIN — CEFTRIAXONE SODIUM 2 G: 2 INJECTION, POWDER, FOR SOLUTION INTRAMUSCULAR; INTRAVENOUS at 12:22

## 2019-08-01 RX ADMIN — HYDRALAZINE HYDROCHLORIDE 25 MG: 25 TABLET ORAL at 14:52

## 2019-08-01 RX ADMIN — CLOPIDOGREL BISULFATE 75 MG: 75 TABLET ORAL at 09:14

## 2019-08-01 RX ADMIN — ROPINIROLE 0.5 MG: 0.5 TABLET, FILM COATED ORAL at 09:15

## 2019-08-01 RX ADMIN — PANTOPRAZOLE SODIUM 40 MG: 40 TABLET, DELAYED RELEASE ORAL at 06:00

## 2019-08-01 RX ADMIN — LISINOPRIL 30 MG: 20 TABLET ORAL at 09:15

## 2019-08-01 NOTE — PROGRESS NOTES
Continued Stay Note   [facility]     Patient Name: Reny Elena  MRN: [id number]  Today's Date: 8/1/2019    Admit Date: 7/24/2019    Discharge Plan     Row Name 08/01/19 1025       Plan    Plan  Home with      Patient/Family in Agreement with Plan  yes    Plan Comments  Plan for patient to d/c to home today.  arranged tranportation with brother in law and they plan to be here around 3-3:30. Attended AM multidisplinary rounds and patient still able to discharge. I called [facility]  @ [phone] spoke with Matias and they will use patient's PCP:  to get resume  order. I faxed requested clinical information to . Olamide @ 1632        Discharge Codes    No documentation.       Expected Discharge Date and Time     Expected Discharge Date Expected Discharge Time    Aug 1, 2019             Kristin Rogers RN

## 2019-08-01 NOTE — PLAN OF CARE
Problem: Patient Care Overview  Goal: Plan of Care Review  Outcome: Ongoing (interventions implemented as appropriate)   08/01/19 0835   Coping/Psychosocial   Plan of Care Reviewed With patient   Plan of Care Review   Progress improving   OTHER   Outcome Summary Pt. continues to progress to goals, CI in and out of bed and and CGA with sit to stand and keeping NWB with cues. Good participation UE TE. Will continue to focus on strengthening and transfering keep NWB RLE.

## 2019-08-01 NOTE — DISCHARGE INSTR - APPOINTMENTS
You have an appointment with Yandel Paulson MD  on August 12, 2019 @ 9:40 AM.   Call them if you have any questions. Phone: [phone]  [address] [facility] [place] [address]    You have an appointment with Anthony Crouch MD  on ***.   The doctor's office will call you with an appointment time. Call them if you have any questions. Phone: [phone]  [address] [place] [address]

## 2019-08-01 NOTE — PLAN OF CARE
Problem: Patient Care Overview  Goal: Plan of Care Review  Outcome: Ongoing (interventions implemented as appropriate)   08/01/19 0302   OTHER   Outcome Summary Patient still having increased BP. PRN Clonidine given this shift for SBP >160. Patient excited to go home in am. VS otherwise stable. Will continue to monitor.

## 2019-08-01 NOTE — THERAPY TREATMENT NOTE
Acute Care - Occupational Therapy Treatment Note  [facility]     Patient Name: Reny Elena  : 1960  MRN: [id number]  Today's Date: 2019  Onset of Illness/Injury or Date of Surgery: 19  Date of Referral to OT: 19  Referring Physician: VASILIY SETHI     Admit Date: 2019       ICD-10-CM ICD-9-CM   1. Impaired mobility and ADLs Z74.09 799.89   2. Impaired functional mobility, balance, gait, and endurance Z74.09 V49.89   3. Cerebrovascular accident (CVA), unspecified mechanism (CMS/Formerly [facility]) I63.9 434.91   4. Cognitive communication disorder R41.841 315.32     Patient Active Problem List   Diagnosis   • Severe sepsis (CMS/Formerly [facility])   • Tibia/fibula fracture   • Iron deficiency anemia   • Type 2 diabetes mellitus with hyperglycemia, with long-term current use of insulin (CMS/Formerly [facility])   • Wound of right ankle   • Hyperglycemia   • Confusion   • Cellulitis   • Metabolic encephalopathy   • History of non-ST elevation myocardial infarction (NSTEMI)   • CKD (chronic kidney disease) stage 3, GFR 30-59 ml/min (CMS/Formerly [facility])   • Elevated troponin   • HTN (hypertension)   • CVA (cerebral vascular accident) (CMS/Formerly [facility])   • Chronic diastolic CHF (congestive heart failure) (CMS/Formerly [facility])   • Decubitus ulcer of coccyx, unspecified pressure ulcer stage   • Depression   • Hyperkalemia   • Expressive aphasia   • Impaired mobility and ADLs     Past Medical History:   Diagnosis Date   • Arthritis    • Closed fracture of right fibula and tibia 2018   • Depression    • Diabetic ulcer of left foot associated with type 2 diabetes mellitus (CMS/Formerly [facility]) 2017   • Diastolic dysfunction    • Essential hypertension    • Hyperlipidemia    • Iron deficiency anemia 2018   • PAD (peripheral artery disease) (CMS/Formerly [facility])    • Type 2 diabetes mellitus with hyperglycemia, with long-term current use of insulin (CMS/Formerly [facility]) 2018     Past Surgical History:   Procedure Laterality Date   • BACK SURGERY      Post spinal diskectomy,  osteophytectomy in one lumbar interspace   • CATARACT EXTRACTION      Left    •  SECTION     • DENTAL PROCEDURE     • HYSTERECTOMY     • INCISION AND DRAINAGE LEG Left 4/15/2017    Procedure: INCISION AND DRAINAGE LOWER EXTREMITY;  Surgeon: Basilio Morris MD;  Location: [facility] OR;  Service:    • INCISION AND DRAINAGE LEG Left 2017    Procedure: Irrigation and Debridment abcess diabetic wound left foot with deep culture;  Surgeon: Basilio Morris MD;  Location: [facility] OR;  Service:    • KNEE ARTHROSCOPY Right    • ORIF TIBIA FRACTURE Right 2018    Procedure: TIBIAL  FRACTURE OPEN REDUCTION INTERNAL FIXATION;  Surgeon: Jung Barragan MD;  Location: [facility] OR;  Service: Orthopedics   • TOE AMPUTATION Right     5th   • TUBAL ABDOMINAL LIGATION         Therapy Treatment    Rehabilitation Treatment Summary     Row Name 19 0835             Treatment Time/Intention    Discipline  occupational therapist  -TARA      Document Type  therapy note (daily note)  -TARA      Subjective Information  no complaints  -TARA      Mode of Treatment  individual therapy;occupational therapy  -TARA      Patient/Family Observations  Pt. in bed on RA, tele, bed alarm, no family present.  -TARA      Care Plan Review  evaluation/treatment results reviewed;risks/benefits reviewed  -TARA      Patient Effort  good  -TARA      Comment  NWB RLE RLE re-injury x 2 with last in   -TARA      Existing Precautions/Restrictions  hip;non-weight bearing  (Significant)  NWB RLE  -TARA      Patient Response to Treatment  no adverse effects  -TARA      Recorded by [TARA] Donya Anthony, KORINA 19 0934      Row Name 19 0835             Vital Signs    Pre Systolic BP Rehab  138  -TARA      Pre Treatment Diastolic BP  73  -TARA      Pretreatment Heart Rate (beats/min)  72  -TARA      Posttreatment Heart Rate (beats/min)  69  -TARA      Pre SpO2 (%)  93  -TARA      O2 Delivery Pre Treatment  room air  -TARA      Post SpO2 (%)  100  -TARA      O2 Delivery Post  Treatment  room air  -TARA      Pre Patient Position  Supine  -TARA      Intra Patient Position  Sitting  -TARA      Post Patient Position  Sitting  -TARA      Recorded by [TARA] Donya Anthony, OT 08/01/19 0934      Row Name 08/01/19 0835             Cognitive Assessment/Intervention- PT/OT    Affect/Mental Status (Cognitive)  WFL  -TARA      Orientation Status (Cognition)  oriented x 3  -TARA      Follows Commands (Cognition)  follows one step commands;over 90% accuracy;repetition of directions required  -TARA      Safety Deficit (Cognitive)  mild deficit;safety precautions awareness  -TARA      Cognitive Interventions (Cognitive)  occupation/activity based interventions  -TARA      Personal Safety Interventions  fall prevention program maintained;gait belt;nonskid shoes/slippers when out of bed  -TARA      Recorded by [TARA] Donya Anthony, OT 08/01/19 0934      Row Name 08/01/19 0835             Bed Mobility Assessment/Treatment    Supine-Sit [place] (Bed Mobility)  supervision  -TARA      Sit-Supine [place] (Bed Mobility)  supervision  -TARA      Bed Mobility, Safety Issues  decreased use of legs for bridging/pushing  -TAAR      Assistive Device (Bed Mobility)  bed rails;head of bed elevated  -TARA      Comment (Bed Mobility)  pt. laid back down for rest and then resat up a second time.  -TARA      Recorded by [TARA] Donya Anthony, OT 08/01/19 0934      Row Name 08/01/19 0835             Functional Mobility    Functional Mobility- Ind. Level  not tested  -TARA      Recorded by [TARA] Donya Anthony, OT 08/01/19 0934      Row Name 08/01/19 0835             Transfer Assessment/Treatment    Transfer Assessment/Treatment  stand-sit transfer;sit-stand transfer  -TARA      Maintains Weight-bearing Status (Transfers)  -- pt. able to maintain NWB RLE with cues  -TARA      Comment (Transfers)  cues not to even let R toes rest on floor.  Pt. stood and did turn pivot to right and then back to left and back to sit EOB  -TARA      Recorded by [TARA] Raj  Donya Guerrero, OT 08/01/19 0934      Row Name 08/01/19 0835             Sit-Stand Transfer    Sit-Stand [place] (Transfers)  contact guard  -TARA      Assistive Device (Sit-Stand Transfers)  walker, front-wheeled  -TARA      Recorded by [TARA] Donya Anthony, OT 08/01/19 0934      Row Name 08/01/19 0835             Stand-Sit Transfer    Stand-Sit [place] (Transfers)  contact guard  -TARA      Assistive Device (Stand-Sit Transfers)  walker, front-wheeled  -TARA      Recorded by [TARA] Donya Anthony, OT 08/01/19 0934      Row Name 08/01/19 0835             ADL Assessment/Intervention    11143 - OT Self Care/Mgmt Minutes  5  -TARA      BADL Assessment/Intervention  grooming  -TARA      Recorded by [TARA] Donya Anthony, OT 08/01/19 0934      Row Name 08/01/19 0835             Grooming Assessment/Training    [place] Level (Grooming)  hair care, combing/brushing;minimum assist (75% patient effort);verbal cues  -TARA      Comment (Grooming)  Pt. sitting EOB required extended time to comb hair, but still unable to push hard enought to fully get to roots of hair and OT had to assist.  -TARA      Recorded by [TARA] Donya Anthony, OT 08/01/19 0934      Row Name 08/01/19 0835             BADL Safety/Performance    Impairments, BADL Safety/Performance  balance;strength  -TARA      Recorded by [TARA] Donya Anthony, OT 08/01/19 0934      Row Name 08/01/19 0835             Therapeutic Exercise    38307 - OT Therapeutic Exercise Minutes  10  -TARA      49295 - OT Therapeutic Activity Minutes  8  -TARA      Recorded by [TARA] Donya Anthony, OT 08/01/19 0934      Row Name 08/01/19 0835             Therapeutic Exercise    Upper Extremity Range of Motion (Therapeutic Exercise)  shoulder flexion/extension, bilateral;shoulder abduction/adduction, bilateral;elbow flexion/extension, bilateral  -TARA      Lower Extremity (Therapeutic Exercise)  gluteal sets  -TARA      Exercise Type (Therapeutic Exercise)  AROM (active range of motion);resistive exercises   -TARA      Position (Therapeutic Exercise)  seated;supine  -TARA      Sets/Reps (Therapeutic Exercise)  1/15  -TARA      Expected Outcome (Therapeutic Exercise)  improve functional tolerance, self-care activity;improve performance, transfer skills  -TARA      Comment (Therapeutic Exercise)  Pt. required cues to continue and do fully range.  Pt. moved very slowly and holding mvmt at times.  -TARA      Recorded by [TARA] Donya Anthony, OT 08/01/19 0934      Row Name 08/01/19 0835             Balance    Balance  dynamic sitting balance  -TARA      Recorded by [TARA] Donya Anthony, OT 08/01/19 0934      Row Name 08/01/19 0835             Static Sitting Balance    Level of [place] (Unsupported Sitting, Static Balance)  independent  -TARA      Sitting Position (Unsupported Sitting, Static Balance)  sitting on edge of bed  -TARA      Time Able to Maintain Position (Unsupported Sitting, Static Balance)  more than 5 minutes  -TARA      Recorded by [TARA] Donya Anthony, OT 08/01/19 0934      Row Name 08/01/19 0835             Dynamic Sitting Balance    Level of [place], Reaches Outside Midline (Sitting, Dynamic Balance)  independent  -TARA      Sitting Position, Reaches Outside Midline (Sitting, Dynamic Balance)  sitting on edge of bed UE TE  -TARA      Recorded by [TARA] Donya Anthony, OT 08/01/19 0934      Row Name 08/01/19 0835             Static Standing Balance    Level of [place] (Supported Standing, Static Balance)  contact guard assist  -TARA      Time Able to Maintain Position (Supported Standing, Static Balance)  45 to 60 seconds  -TARA      Assistive Device Utilized (Supported Standing, Static Balance)  walker, rolling  -TARA      Recorded by [TARA] Donya Anthony, OT 08/01/19 0934      Row Name 08/01/19 0835             Positioning and Restraints    Pre-Treatment Position  in bed  -TARA      Post Treatment Position  bed  -TARA      In Bed  sitting EOB;call light within reach;encouraged to call for assist;exit alarm on nurse OK pt.  to sit EOB and eat BF  -TARA      Recorded by [TARA] Donya Anthony, OT 08/01/19 0934      Row Name 08/01/19 0863             Pain Scale: Numbers Pre/Post-Treatment    Pain Scale: Numbers, Pretreatment  0/10 - no pain  -TARA      Pain Scale: Numbers, Post-Treatment  0/10 - no pain  -TARA      Recorded by [TARA] Donya Anthony, OT 08/01/19 0934      Row Name                Wound 07/24/19 0100 Right anterior hip incision    Wound - Properties Group Date first assessed: 07/24/19 [SD] Time first assessed: 0100 [SD] Present On Admission : yes [SD] Side: Right [SD] Orientation: anterior [SD] Location: hip [SD] Type: incision [SD] Stage, Pressure Injury: other (see comments) [SD], incision site from previous surgery  Wound Outcome: Other [SD], in the process of healing  Recorded by:  [SD] Leah Overton RN 07/24/19 0433    Row Name                Wound 07/24/19 0100 Bilateral lower coccyx pressure injury    Wound - Properties Group Date first assessed: 07/24/19 [SD] Time first assessed: 0100 [SD] Present On Admission : yes [SD] Side: Bilateral [SD] Orientation: lower [SD] Location: coccyx [SD] Type: pressure injury [SD] Stage, Pressure Injury: Stage 1 [SD] Recorded by:  [SD] Leah Overton RN 07/24/19 0435    Row Name 08/01/19 0816             Plan of Care Review    Plan of Care Reviewed With  patient  -TARA      Recorded by [TARA] Donya Anthony, OT 08/01/19 0934      Row Name 08/01/19 0835             Outcome Summary/Treatment Plan (OT)    Daily Summary of Progress (OT)  progress toward functional goals is good  -TARA      Plan for Continued Treatment (OT)  Cont OT POC  -TARA      Anticipated Discharge Disposition (OT)  home with assist;home with home health pt. declining rehab  -TARA      Recorded by [TARA] Donya Anthony, KORINA 08/01/19 0934        User Key  (r) = Recorded By, (t) = Taken By, (c) = Cosigned By    Initials Name Effective Dates Discipline    TARA Donya Anthony, KORINA 06/08/18 -  OT    Leah Queen RN 06/23/17 -  Nurse         Wound 07/24/19 0100 Right anterior hip incision (Active)   Dressing Appearance open to air 8/1/2019  6:00 AM   Closure Liquid skin adhesive 8/1/2019  6:00 AM   Base clean;dry 8/1/2019  6:00 AM   Periwound dry;intact 8/1/2019  2:21 AM   Periwound Temperature warm 8/1/2019  2:21 AM   Edges rolled/closed 8/1/2019  2:21 AM   Drainage Amount none 8/1/2019  2:21 AM   Dressing Care, Wound open to air 8/1/2019  2:21 AM       Wound 07/24/19 0100 Bilateral lower coccyx pressure injury (Active)   Dressing Appearance open to air 8/1/2019  6:00 AM   Base red;blanchable 7/31/2019  8:00 PM   Periwound intact;dry;pink;blanchable 7/31/2019  8:00 PM   Edges irregular 7/31/2019  8:00 PM   Drainage Amount none 7/31/2019  8:00 PM   Care, Wound cleansed with 7/31/2019  8:00 PM   Dressing Care, Wound other (see comments) 7/31/2019  8:00 PM   Periwound Care, Wound dry periwound area maintained 7/31/2019  8:00 PM     Rehab Goal Summary     Row Name 08/01/19 0830             Transfer Goal 1 (OT)    Progress/Outcome (Transfer Goal 1, OT)  goal partially met simulated sit to stand and stand [place] with EOB transfers  -TARA         Toileting Goal 1 (OT)    Progress/Outcome (Toileting Goal 1, OT)  goal ongoing pt. just off bed pan prior to OT arrival  -TARA         Grooming Goal 1 (OT)    Progress/Outcome (Grooming Goal 1, OT)  goal ongoing simulated, but not demonstrated  -TARA        User Key  (r) = Recorded By, (t) = Taken By, (c) = Cosigned By    Initials Name Provider Type Discipline    Donya Terry, OT Occupational Therapist OT        Occupational Therapy Education     Title: PT OT SLP Therapies (Done)     Topic: Occupational Therapy (Done)     Point: ADL training (Done)     Description: Instruct learner(s) on proper safety adaptation and remediation techniques during self care or transfers.   Instruct in proper use of assistive devices.    Learning Progress Summary           Patient Acceptance, E, NR,VU by TARA at 8/1/2019  8:35 AM     Comment:  UE TE, benefits of activity, transfer safety    Acceptance, E,TB, VU by KATHRINE at 7/30/2019  3:57 PM    Acceptance, E, VU by DEEPALI at 7/29/2019  1:39 AM    Nonacceptance, E, NL by JOHNATHAN at 7/25/2019  3:47 PM    Comment:  ADL retraining; therapeutic ex    Nonacceptance, E, NL by JOHNATHAN at 7/24/2019  8:20 AM    Comment:  Educated pt on OT role, pt deficits.                   Point: Home exercise program (Done)     Description: Instruct learner(s) on appropriate technique for monitoring, assisting and/or progressing therapeutic exercises/activities.    Learning Progress Summary           Patient Acceptance, E, NR,VU by TARA at 8/1/2019  8:35 AM    Comment:  UE TE, benefits of activity, transfer safety    Acceptance, E,TB, VU by KATHRINE at 7/30/2019  3:57 PM    Acceptance, E, VU by DEEPALI at 7/29/2019  1:39 AM                   Point: Precautions (Done)     Description: Instruct learner(s) on prescribed precautions during self-care and functional transfers.    Learning Progress Summary           Patient Acceptance, E, NR,VU by TARA at 8/1/2019  8:35 AM    Comment:  UE TE, benefits of activity, transfer safety    Acceptance, E,TB, VU by KATHRINE at 7/30/2019  3:57 PM    Acceptance, E, VU by DEEPALI at 7/29/2019  1:39 AM                   Point: Body mechanics (Done)     Description: Instruct learner(s) on proper positioning and spine alignment during self-care, functional mobility activities and/or exercises.    Learning Progress Summary           Patient Acceptance, E,TB, VU by KATHRINE at 7/30/2019  3:57 PM    Acceptance, E, VU by DEEPALI at 7/29/2019  1:39 AM                               User Key     Initials Effective Dates Name Provider Type Discipline    TARA 06/08/18 -  Donya Anthony OT Occupational Therapist OT    AN 06/22/15 -  Vianca Vargas OT Occupational Therapist OT     02/15/19 -  Ella Mark RN Registered Nurse Nurse    KATHRINE 06/23/17 -  Elsa Verdin RN Registered Nurse Nurse                KORINA Recommendation and Plan  Outcome  Summary/Treatment Plan (OT)  Daily Summary of Progress (OT): progress toward functional goals is good  Plan for Continued Treatment (OT): Cont OT POC  Anticipated Discharge Disposition (OT): home with assist, home with home health(pt. declining rehab)  Daily Summary of Progress (OT): progress toward functional goals is good  Plan of Care Review  Plan of Care Reviewed With: patient  Plan of Care Reviewed With: patient  Outcome Summary: Pt. continues to progress to goals, CI in and out of bed and and CGA with sit to stand and keeping NWB with cues.  Good participation UE TE.  Will continue to focus on strengthening and transfering keep NWB RLE.  Outcome Measures     Row Name 08/01/19 0835 07/31/19 1455 07/30/19 1423       How much help from another person do you currently need...    Turning from your back to your side while in flat bed without using bedrails?  --  4  -VG  4  -VG    Moving from lying on back to sitting on the side of a flat bed without bedrails?  --  4  -VG  4  -VG    Moving to and from a bed to a chair (including a wheelchair)?  --  3  -VG  3  -VG    Standing up from a chair using your arms (e.g., wheelchair, bedside chair)?  --  3  -VG  3  -VG    Climbing 3-5 steps with a railing?  --  1  -VG  1  -VG    To walk in hospital room?  --  1  -VG  1  -VG    AM-PAC 6 Clicks Score (PT)  --  16  -VG  16  -VG       How much help from another is currently needed...    Putting on and taking off regular lower body clothing?  2  -TARA  --  --    Bathing (including washing, rinsing, and drying)  2  -TARA  --  --    Toileting (which includes using toilet bed pan or urinal)  2  -TARA  --  --    Putting on and taking off regular upper body clothing  3  -TARA  --  --    Taking care of personal grooming (such as brushing teeth)  3  -TARA  --  --    Eating meals  4  -TARA  --  --    AM-PAC 6 Clicks Score (OT)  16  -TARA  --  --       Modified Edilia Scale    Modified Grant Scale  4 - Moderately severe disability.  Unable to walk  without assistance, and unable to attend to own bodily needs without assistance.  -TARA  --  --       Functional Assessment    Outcome Measure Options  AM-PAC 6 Clicks Daily Activity (OT)  -TARA  AM-PAC 6 Clicks Basic Mobility (PT)  -VG  AM-PAC 6 Clicks Basic Mobility (PT)  -VG    Row Name 07/29/19 1020             How much help from another is currently needed...    Putting on and taking off regular lower body clothing?  2  -SD      Bathing (including washing, rinsing, and drying)  2  -SD      Toileting (which includes using toilet bed pan or urinal)  2  -SD      Putting on and taking off regular upper body clothing  3  -SD      Taking care of personal grooming (such as brushing teeth)  3  -SD      Eating meals  3  -SD      AM-PAC 6 Clicks Score (OT)  15  -SD         Functional Assessment    Outcome Measure Options  AM-PAC 6 Clicks Daily Activity (OT)  -SD        User Key  (r) = Recorded By, (t) = Taken By, (c) = Cosigned By    Initials Name Provider Type    Donya Terry, OT Occupational Therapist    Spring Zarate, OT Occupational Therapist    Betsy Valverde, PT Physical Therapist           Time Calculation:   Time Calculation- OT     Row Name 08/01/19 0835             Time Calculation- OT    OT Start Time  0835  -TARA      OT Received On  08/01/19  -TARA      OT Goal Re-Cert Due Date  08/03/19  -TARA         Timed Charges    45338 - OT Therapeutic Exercise Minutes  10  -TARA      61546 - OT Therapeutic Activity Minutes  8  -TARA      28200 - OT Self Care/Mgmt Minutes  5  -TARA        User Key  (r) = Recorded By, (t) = Taken By, (c) = Cosigned By    Initials Name Provider Type    Donya Terry OT Occupational Therapist        Therapy Charges for Today     Code Description Service Date Service Provider Modifiers Qty    93113668205 HC OT THER PROC EA 15 MIN 8/1/2019 Donya Anthony OT GO 1    27451904181 HC OT THERAPEUTIC ACT EA 15 MIN 8/1/2019 Donya Anthony OT GO 1               Donya Anthony  OT  8/1/2019

## 2019-08-01 NOTE — PROGRESS NOTES
Continued Stay Note  [facility]     Patient Name: Reny Elena  MRN: [id number]  Today's Date: 8/1/2019    Admit Date: 7/24/2019    Discharge Plan     Row Name 08/01/19 1421       Plan    Plan  Update     Plan Comments  I rec'd a call from Mariola with [facility] and they haven't followed patient since March 2019. Mariola also called [facility](only other HH for that [facility]) and patient not current with their agency.  Our documentation stated current with [facility]. I rec'd orders from  for SKN and PT. I faxed order and discharge summary to Mariola/Judy PALUMBO. Olamide @ [phone]        Discharge Codes    No documentation.       Expected Discharge Date and Time     Expected Discharge Date Expected Discharge Time    Aug 1, 2019             Kristin Rogers RN

## 2019-08-01 NOTE — PLAN OF CARE
Problem: Patient Care Overview  Goal: Plan of Care Review  Outcome: Outcome(s) achieved Date Met: 08/01/19 08/01/19 0835   Coping/Psychosocial   Plan of Care Reviewed With patient   Plan of Care Review   Progress improving   OTHER   Outcome Summary Pt. continues to progress to goals, CI in and out of bed and and CGA with sit to stand and keeping NWB with cues. Good participation UE TE. Will continue to focus on strengthening and transfering keep NWB RLE.     Goal: Individualization and Mutuality  Outcome: Outcome(s) achieved Date Met: 08/01/19    Goal: Discharge Needs Assessment  Outcome: Outcome(s) achieved Date Met: 08/01/19    Goal: Interprofessional Rounds/Family Conf  Outcome: Outcome(s) achieved Date Met: 08/01/19      Problem: Fall Risk (Adult)  Goal: Identify Related Risk Factors and Signs and Symptoms  Outcome: Outcome(s) achieved Date Met: 08/01/19    Goal: Absence of Fall  Outcome: Outcome(s) achieved Date Met: 08/01/19      Problem: Skin Injury Risk (Adult)  Goal: Identify Related Risk Factors and Signs and Symptoms  Outcome: Outcome(s) achieved Date Met: 08/01/19    Goal: Skin Health and Integrity  Outcome: Outcome(s) achieved Date Met: 08/01/19      Problem: Seizure Disorder/Epilepsy (Adult)  Goal: Signs and Symptoms of Listed Potential Problems Will be Absent, Minimized or Managed (Seizure Disorder/Epilepsy)  Outcome: Outcome(s) achieved Date Met: 08/01/19

## 2019-08-01 NOTE — DISCHARGE SUMMARY
[facility] Medicine Services  DISCHARGE SUMMARY    Patient Name: Reny Elena  : 1960  MRN: [id number]    Date of Admission: 2019  Date of Discharge:  2019  Primary Care Physician: Yandel Paulson MD    Consults     Date and Time Order Name Status Description    2019 0030 Inpatient Infectious Diseases Consult Completed     2019 0249 Inpatient Neurology Consult Stroke Completed           Hospital Course     Presenting Problem:   CVA (cerebral vascular accident) (CMS/Formerly [facility]) [I63.9]  CVA (cerebral vascular accident) (CMS/Formerly [facility]) [I63.9]  Impaired mobility and ADLs [Z74.09]    Active Hospital Problems    Diagnosis  POA   • **CVA (cerebral vascular accident) (CMS/Formerly [facility]) [I63.9]  Yes   • HTN (hypertension) [I10]  Yes   • Chronic diastolic CHF (congestive heart failure) (CMS/Formerly [facility]) [I50.32]  Yes   • Decubitus ulcer of coccyx, unspecified pressure ulcer stage [L89.159]  Yes   • Depression [F32.9]  Yes   • Hyperkalemia [E87.5]  Unknown   • Expressive aphasia [R47.01]  Unknown   • Impaired mobility and ADLs [Z74.09]  Yes   • Hyperglycemia [R73.9]  Yes   • Type 2 diabetes mellitus with hyperglycemia, with long-term current use of insulin (CMS/Formerly [facility]) [E11.65, Z79.4]  Not Applicable   • Tibia/fibula fracture [S82.209A, S82.409A]  Yes      Resolved Hospital Problems   No resolved problems to display.          Hospital Course:  Reny Elena is a 59 y.o. female  with a medical hx significant for diastolic CHF, HTN, HLD, T2DM with ulcer of left foot, and depression was brought to OSH via EMS for sudden onset of aphasia. She was transferred to [facility] for higher level of care.      Patient was admitted to hospital medicine for further evaluation. Neurology was consulted for acute encephalopathy, nonconvulsive/new onset seizures. Imaging studies have not revealed acute disease other than CT perfusion images which noted potentially reversible ischemia but these images were compromised by  artifact and these findings were not confirmed on MRI.  EEG which suggested recent seizures. Neurology suspected seizures were related to cefepime. Patient was started on Depakote and Keppra. Neurology recommends for patient to follow up with Neurology closer to home so patient will be able to make to appt for further medication adjustments as needed. Encephalopathy and epileptiform activity has resolved.     Infectious diease was consulted for antibiotic management for chronic osteomyelitis following trimalleolar fracture and multiple surgeries. . Cefepime was stopped and patient was given Rocephin.Patient had LP and no growth from culture and NGTD on blood cultures.  ID talked with Dr. Ware who is patient surgeon. Patient will be sent home with Cephalexin 500 mg daily for chronic osteo with a plan for indefinite therapy. Dr. Crouch will follow up as outpatient.      Patient has had some elevated blood pressures and  lisinopril was started.  We will defer to PCP for further medication adjustments as needed.     Patient has remained clinically stable and will be discharged home today. She will need to follow up with PCP one week. Follow up with Neurology 2-3 weeks. Follow up Dr. Crouch 1 week    Discharge Follow Up Recommendations for labs/diagnostics:   Pcp one week   Follow up with Dr. Ware one week   Neurology follow up  2-3 weeks in [facility]     Day of Discharge     HPI:   Patient is sitting on side of bed in NAD eating breakfast. She feels good today. Tolerating diet. Patient is agreeable to discharge home today.     Review of Systems  Gen- No fevers, chills  CV- No chest pain, palpitations  Resp- No cough, dyspnea  GI- No N/V/D, abd pain  Otherwise ROS is negative except as mentioned in the HPI.    Vital Signs:   Temp:  [97.9 °F (36.6 °C)-98.1 °F (36.7 °C)] 98 °F (36.7 °C)  Heart Rate:  [55-71] 68  Resp:  [16-20] 16  BP: (134-199)/(68-96) 159/79     Physical Exam:  GEN- resting in bed in no evident  distress  HEENT- atraumatic, normocephlic  NECK- supple, trachea midline, no masses  RESP: ctab, normal effort, room air   CV: no murmurs, s1/s2, rrr  MSK: No edema of lower extremities, right LE with boot and ace wrap   NEURO: Awake, alert, Ox3.  There is no focality  SKIN: no rashes      Pertinent  and/or Most Recent Results     Results from last 7 days   Lab Units 07/30/19  0459 07/29/19  0805 07/26/19  1123 07/26/19  0714   WBC 10*3/mm3 6.73 4.31 5.15  --    HEMOGLOBIN g/dL 9.8* 9.7* 10.3*  --    HEMATOCRIT % 30.8* 29.8* 31.7*  --    PLATELETS 10*3/mm3 140 126* 132*  --    SODIUM mmol/L 143 144  --  145   POTASSIUM mmol/L 3.6 3.6  --  3.9   CHLORIDE mmol/L 107 108*  --  107   CO2 mmol/L 24.0 25.0  --  19.0*   BUN mg/dL 29* 31*  --  25*   CREATININE mg/dL 1.04* 1.05*  --  1.00   GLUCOSE mg/dL 84 77  --  208*   CALCIUM mg/dL 8.1* 8.0*  --  8.4*     Results from last 7 days   Lab Units 07/30/19  0459 07/29/19  0805   BILIRUBIN mg/dL <0.2* <0.2*   ALK PHOS U/L 107 103   ALT (SGPT) U/L 7 8   AST (SGOT) U/L 10 11           Invalid input(s): TG, LDLCALC, LDLREALC  Results from last 7 days   Lab Units 07/30/19  2116   TROPONIN T ng/mL <0.010       Brief Urine Lab Results  (Last result in the past 365 days)      Color   Clarity   Blood   Leuk Est   Nitrite   Protein   CREAT   Urine HCG        07/23/19 2240 Yellow Clear Trace Negative Negative >=300 mg/dL (3+)               Microbiology Results Abnormal     Procedure Component Value - Date/Time    Blood Culture - Blood, Blood, Central Line [[id number]] Collected:  07/25/19 1329    Lab Status:  Final result Specimen:  Blood, Central Line Updated:  07/30/19 1415     Blood Culture No growth at 5 days    Meningitis / Encephalitis Panel, PCR - Cerebrospinal Fluid, Lumbar Puncture [[id number]]  (Normal) Collected:  07/26/19 1410    Lab Status:  Final result Specimen:  Cerebrospinal Fluid from Lumbar Puncture Updated:  07/26/19 1604     ESCHERICHIA COLI K1, PCR Not Detected      HAEMOPHILUS INFLUENZAE, PCR Not Detected     LISTERIA MONOCYTOGENES, PCR Not Detected     NEISSERIA MENINGITIDIS, PCR Not Detected     STREPTOCOCCUS AGALACTIAE, PCR Not Detected     STREPTOCOCCUS PNEUMONIAE, PCR Not Detected     CYTOMEGALOVIRUS (CMV), PCR Not Detected     ENTEROVIRUS, PCR Not Detected     HERPES SIMPLEX VIRUS 1 (HSV-1), PCR Not Detected     HERPES SIMPLEX VIRUS 2 (HSV-2), PCR Not Detected     HUMAN PARECHOVIRUS, PCR Not Detected     VARICELLA ZOSTER VIRUS (VZV), PCR Not Detected     CRYPTOCOCCUS NEOFORMANS / GATTII, PCR Not Detected     HUMAN HERPES VIRUS 6 PCR Not Detected          Imaging Results (all)     Procedure Component Value Units Date/Time    IR Lumbar Puncture Diag/Thera [[id number]] Collected:  07/26/19 1535     Updated:  07/26/19 1617    Narrative:       EXAMINATION: IR LUMBAR PUNCTURE DIAG/THERA-     INDICATION: seizures, encephalopathy; Z74.09-Other reduced mobility;  Z74.09-Other reduced mobility; I63.9-Cerebral infarction, unspecified;  R41.841-Cognitive communication deficit     TECHNIQUE: 18 seconds of fluoroscopic time was used for this procedure.  1 associated image was saved. Informed consent was obtained emergently  by Dr. Caban. The patient was placed in the prone position on the  table. The back was prepped and draped in a sterile fashion. 1%  lidocaine was used as a local anesthetic to the skin and subcutaneous  tissues. Under fluoroscopic guidance a 22-gauge spinal needle was  advanced at the L2-L3 level to the CSF space. Opening pressure was not  able to be measured accurately at this point. Approximately 8 cc of  clear and colorless CSF was returned and collected in 4 numbered vials.  Sample vials were labeled and sent to pathology for further analysis.  The needle was removed.     COMPARISON: NONE     FINDINGS: The patient tolerated the procedure well, and no immediate  complications occurred.          Impression:       Successful fluoroscopic guided lumbar puncture  as above with  no immediate complications.         This report was finalized on 7/26/2019 4:13 PM by Dr. Narayan Cam MD.       MRI Brain Without Contrast [[id number]] Collected:  07/24/19 0636     Updated:  07/24/19 0638    Narrative:       MRI Brain WO    INDICATION:   Patient with sudden onset of aphasia. Episodes of confusion. Symptom onset 7/23/2019.    TECHNIQUE:   MRI of the brain without contrast.    COMPARISON:    CT brain perfusion 7/24/2019    FINDINGS:  Mild prominence of ventricles, sulci and basilar cisterns suggesting mild atrophy. No mass, mass effect or midline shift. No hemorrhage or abnormal extra-axial fluid collections. Brain parenchyma appears normal. No restricted diffusion. Mild diffuse  increased FLAIR signal within the periventricular white matter could reflect early chronic microvascular disease.    Normal intracranial flow voids. Skull base and mastoids unremarkable. Chronic appearing bilateral maxillary and right ethmoid sinus disease. Hypoplastic frontal sinuses.      Impression:       No acute intracranial abnormality identified. In particular no restricted diffusion and no signal abnormality within the frontal lobes bilaterally.    Chronic maxillary and right ethmoid sinus disease.    Signer Name: GWENDOLYN Ventura MD   Signed: 7/24/2019 6:36 AM   Workstation Name: [id number]       CT Angiogram Head With & Without Contrast [[id number]] Collected:  07/24/19 0554     Updated:  07/24/19 0556    Narrative:       CT ANGIOGRAM, HEAD AND NECK, 7/24/2019    HISTORY:  59-year-old female with a fascia beginning early yesterday morning.    TECHNIQUE:  CT angiogram of the head and neck with contrast. 3-D postprocessing was performed and reviewed. Evaluation for a significant carotid arterial stenosis is based on NASCET criteria. Radiation dose reduction techniques included automated exposure control.  Radiation audit for known CT and nuclear cardiology exams in the last 12 months:  0.    COMPARISON:  CT cerebral perfusion 7/24/2019    CTA NECK:  Normal aortic arch branching pattern with no proximal great vessel stenosis. The vertebral arteries are widely patent and codominant. Cervical carotid bifurcations demonstrate no evidence of hemodynamically significant stenoses by NASCET criteria.    Cervical soft tissues are unremarkable. No acute osseous abnormality.    CTA HEAD:  Intracranially, there is symmetric distal vascular contrast distribution in the anterior, middle and posterior cerebral artery territories. No evidence of intracranial arterial flow limiting stenosis. No intracranial aneurysm or vascular malformation is  identified. The dural venous sinuses appear normal. No intracranial mass or abnormal contrast enhancement. Mucosal thickening bilateral maxillary sinuses.      Impression:       Negative CT angiogram of the head and neck. No intracranial or extracranial arterial flow limiting stenosis or aneurysm. Mucosal thickening bilateral maxillary sinuses.    Signer Name: Jas Castrejon MD   Signed: 7/24/2019 5:54 AM   Workstation Name: [id number]       CT Angiogram Neck With & Without Contrast [[id number]] Collected:  07/24/19 0554     Updated:  07/24/19 0556    Narrative:       CT ANGIOGRAM, HEAD AND NECK, 7/24/2019    HISTORY:  59-year-old female with a fascia beginning early yesterday morning.    TECHNIQUE:  CT angiogram of the head and neck with contrast. 3-D postprocessing was performed and reviewed. Evaluation for a significant carotid arterial stenosis is based on NASCET criteria. Radiation dose reduction techniques included automated exposure control.  Radiation audit for known CT and nuclear cardiology exams in the last 12 months: 0.    COMPARISON:  CT cerebral perfusion 7/24/2019    CTA NECK:  Normal aortic arch branching pattern with no proximal great vessel stenosis. The vertebral arteries are widely patent and codominant. Cervical carotid bifurcations demonstrate no  evidence of hemodynamically significant stenoses by NASCET criteria.    Cervical soft tissues are unremarkable. No acute osseous abnormality.    CTA HEAD:  Intracranially, there is symmetric distal vascular contrast distribution in the anterior, middle and posterior cerebral artery territories. No evidence of intracranial arterial flow limiting stenosis. No intracranial aneurysm or vascular malformation is  identified. The dural venous sinuses appear normal. No intracranial mass or abnormal contrast enhancement. Mucosal thickening bilateral maxillary sinuses.      Impression:       Negative CT angiogram of the head and neck. No intracranial or extracranial arterial flow limiting stenosis or aneurysm. Mucosal thickening bilateral maxillary sinuses.    Signer Name: Jas Castrejon MD   Signed: 7/24/2019 5:54 AM   Workstation Name: [email or web address]       CT Cerebral Perfusion With & Without Contrast [[id number]] Collected:  07/24/19 0550     Updated:  07/24/19 0552    Narrative:       CT CEREBRAL PERFUSION, WITH AND WITHOUT CONTRAST, 7/24/2019    HISTORY:   59-year-old female with a fascia beginning early morning yesterday.    TECHNIQUE:   Axial CT images of the brain without and with intravenous contrast using cerebral perfusion protocol. Post-processing parametric maps were created using RAPID software and reviewed. Radiation dose reduction techniques included automated exposure control  or exposure modulation based on body size. CT and nuclear cardiology exams in the last 12 months: 0.    COMPARISON:   None available.    FINDINGS:   Arterial input function is optimal. However, the patient's IV extravasated at the end of the perfusion contrast bolus and the patient moved during the exam.    PERFUSION PARAMETERS:  *  Ischemic tissue volume (Tmax > 6 sec.): 34 mL.  *  Infarct core volume (CBF < 30%): 0 mL.  *  Mismatch (penumbra) volume: 34 mL.  *  Mismatch ratio: Infinite.  *  Location/territory: Bilateral anterior frontal  lobes..    COLLATERAL CIRCULATION PARAMETERS:  *  Volume poor collateral perfusion (Tmax > 10 sec.): 0 mL.  *  Hypoperfusion index (Tmax >10 sec./Tmax > 6 sec.): 0.0.  *  CBV Index (rCBV in Tmax > 6 sec.): 0.9.        Impression:       Questionable potentially reversible ischemia in the anterior frontal lobes bilaterally without evidence of core infarct. The accuracy of the exam may be limited secondary to IV extravasation at the end of the perfusion contrast bolus and patient motion  during the exam.          Signer Name: Jas Castrejon MD   Signed: 7/24/2019 5:50 AM   Workstation Name: [id number]       XR Tibia & Fibula 1 View Right [[id number]] Collected:  07/24/19 0519     Updated:  07/24/19 0521    Narrative:       CR Tibia Fibula 2 Vws RT    INDICATION:   59-year-old female for screening for MRI clearance.    COMPARISON:   None available.    FINDINGS:   Single AP view of the right tibia/fibula.  Postsurgical changes from ORIF of the right tibia and hindfoot fusion. Partially healed fracture of the mid tibial shaft. Partially healed fracture of the mid fibular shaft. No foreign body.      Impression:       ORIF of the right tibia and hindfoot. Partially healed fractures of the mid tibial and fibular shafts. No radiopaque foreign bodies. No contraindication to MRI.    Signer Name: Jas Castrejon MD   Signed: 7/24/2019 5:19 AM   Workstation Name: [id number]       XR Abdomen KUB [[id number]] Collected:  07/24/19 0517     Updated:  07/24/19 0519    Narrative:       CR Abdomen 1 Vw    INDICATION:   59-year-old female for MRI clearance.    COMPARISON:   None available    FINDINGS:  AP radiographs of the abdomen. The renal shadows are symmetric. No renal calculi. No bladder calculi.    The bowel gas pattern is nonobstructive. No acute osseous abnormalities. No radiopaque foreign body.      Impression:       Negative KUB.    Signer Name: Jas Castrejon MD   Signed: 7/24/2019 5:17 AM   Workstation Name: [id number]                        Results for orders placed during the hospital encounter of 03/15/19   Adult Transthoracic Echo Complete W/ Cont if Necessary Per Protocol    Narrative · Normal left ventricular cavity size and wall thickness noted.  · Left ventricular diastolic dysfunction (grade I) consistent with   impaired relaxation.  · Left ventricular systolic function is mildly decreased.  · Estimated EF appears to be in the range of 46 - 50%.  · The aortic valve is abnormal in structure. The valve exhibits sclerosis.   Mild aortic valve regurgitation is present. No aortic valve stenosis is   present.  · The mitral valve is normal in structure. Mild mitral valve regurgitation   is present. No significant mitral valve stenosis is present.  · The tricuspid valve is normal. No evidence of tricuspid valve stenosis   is present. No tricuspid valve regurgitation is present.  · There is no evidence of pericardial effusion.  · Comments: LV systolic function has decreased since 11/22/18(LVEF   decreased from 56-60% then to 46-50% now)            Discharge Details        Discharge Medications      New Medications      Instructions Start Date   Cephalexin 500 MG tablet   500 mg, Oral, Daily      divalproex 500 MG DR tablet  Commonly known as:  DEPAKOTE   1,000 mg, Oral, Every 12 Hours Scheduled      levETIRAcetam 750 MG tablet  Commonly known as:  KEPPRA   750 mg, Oral, Every 12 Hours Scheduled      lisinopril 40 MG tablet  Commonly known as:  PRINIVIL,ZESTRIL   40 mg, Oral, Every 24 Hours Scheduled   Start Date:  8/2/2019        Changes to Medications      Instructions Start Date   gabapentin 800 MG tablet  Commonly known as:  NEURONTIN  What changed:  when to take this   800 mg, Oral, Every 12 Hours      insulin aspart 100 UNIT/ML injection  Commonly known as:  novoLOG  What changed:    · how much to take  · how to take this  · when to take this  · additional instructions   Sliding scale TID with meals. Glucose 150-199:2 units.  240-249: 3 units. 250-299- 4 units. 300-349- 5 units. 350-400 :6 units. Over 400: 7un         Continue These Medications      Instructions Start Date   atorvastatin 40 MG tablet  Commonly known as:  LIPITOR   40 mg, Oral, Daily      busPIRone 10 MG tablet  Commonly known as:  BUSPAR   1 tablet, Oral, Every 8 Hours      CloNIDine 0.1 MG tablet  Commonly known as:  CATAPRES   0.1 mg, Oral, Every 6 Hours PRN      clopidogrel 75 MG tablet  Commonly known as:  PLAVIX   75 mg, Oral, Daily      FLUoxetine 20 MG capsule  Commonly known as:  PROzac   20 mg, Oral      furosemide 20 MG tablet  Commonly known as:  LASIX   20 mg, Oral, Daily PRN      hydrALAZINE 25 MG tablet  Commonly known as:  APRESOLINE   25 mg, Oral, Every 8 Hours Scheduled      HYDROcodone-acetaminophen  MG per tablet  Commonly known as:  NORCO   1 tablet, Oral, Every 4 Hours PRN, Pharmacy directions states every 4 to 6 hours prn pain.      hydrOXYzine 25 MG tablet  Commonly known as:  ATARAX   25 mg, Oral, Daily PRN      Insulin Glargine 100 UNIT/ML injection pen  Commonly known as:  BASAGLAR KWIKPEN   25 Units, Subcutaneous, 2 Times Daily      metoprolol succinate XL 50 MG 24 hr tablet  Commonly known as:  TOPROL-XL   50 mg, Oral, Daily      multivitamin tablet tablet   1 tablet, Oral, Daily      pantoprazole 40 MG EC tablet  Commonly known as:  PROTONIX   40 mg, Oral, Daily      potassium chloride 10 MEQ CR tablet  Commonly known as:  K-DUR   10 mEq, Oral, Daily PRN      rOPINIRole 0.5 MG tablet  Commonly known as:  REQUIP   0.5 mg, Oral, 2 Times Daily, Take 1 hour before bedtime.      temazepam 30 MG capsule  Commonly known as:  RESTORIL   30 mg, Oral, Nightly      tiZANidine 4 MG tablet  Commonly known as:  ZANAFLEX   4 mg, Oral, Every 6 Hours PRN      vitamin D 78480 units capsule capsule  Commonly known as:  ERGOCALCIFEROL   50,000 Units, Oral, Weekly, Prior to [facility] Admission, Patient was on: takes on wednesdays             Allergies    Allergen Reactions   • Penicillins Other (See Comments)     Patient has received cefazolin, ceftriaxone and cefepime during past admissions.   • Codeine Rash     Pt tolerates Norco @ home   • Sulfa Antibiotics Rash         Discharge Disposition:  Home or Self Care    Discharge Diet:  Diet Order   Procedures   • Diet Soft Texture; Ground; Thin; Consistent Carbohydrate         Discharge Activity:   Activity Instructions     Activity as Tolerated      Measure Blood Pressure      Measure Weight              CODE STATUS:    Code Status and Medical Interventions:   Ordered at: 07/24/19 0249     Level Of Support Discussed With:    Patient     Code Status:    CPR     Medical Interventions (Level of Support Prior to Arrest):    Full         No future appointments.    Additional Instructions for the Follow-ups that You Need to Schedule     Discharge Follow-up with PCP   As directed       Currently Documented PCP:    Yandel Paulson MD    PCP Phone Number:    [phone]     Follow Up Details:  pcp one week         Discharge Follow-up with Specified Provider: Jeanie Sweeney orthopedic surgeon in [facility]; 1 Week   As directed      To:  Jeanie Sweeney orthopedic surgeon in [facility]    Follow Up:  1 Week         Discharge Follow-up with Specified Provider: Neurology  near [facility] will need to be established has not seen anyone before for seizure follow up   As directed      To:  Neurology  near [facility] will need to be established has not seen anyone before for seizure follow up    Follow Up Details:  2-3 weeks               Time Spent on Discharge:  35 minutes    Electronically signed by DARIUS Aragon, 08/01/19, 11:00 AM.

## 2019-08-02 ENCOUNTER — READMISSION MANAGEMENT (OUTPATIENT)
Dept: CALL CENTER | Facility: HOSPITAL | Age: 59
End: 2019-08-02

## 2019-08-02 NOTE — PROGRESS NOTES
Case Management Discharge Note    Final Note: Patient d/c to home with [facility] and  picked her up     Destination      No service has been selected for the patient.      Durable Medical Equipment      No service has been selected for the patient.      Dialysis/Infusion      No service has been selected for the patient.      Home Medical Care - Selection Complete      Service Provider Request Status Selected Services Address Phone Number Fax Number    [facility] [place] [facility] [address] [place] [address] [phone]      Therapy      No service has been selected for the patient.      Community Resources      No service has been selected for the patient.        Transportation Services  Private: Car    Final Discharge Disposition Code: 06 - home with home health care

## 2019-08-02 NOTE — OUTREACH NOTE
Prep Survey      Responses   Facility patient discharged from?  [facility]   Is patient eligible?  Yes   Discharge diagnosis  CVA (cerebral vascular accident r/o.... Impaired mobility and ADLs    Does the patient have one of the following disease processes/diagnoses(primary or secondary)?  Other   Does the patient have Home health ordered?  Yes   What is the Home health agency?   [facility]   Is there a DME ordered?  No   Prep survey completed?  Yes          Breanna Esteban RN

## 2019-08-03 ENCOUNTER — READMISSION MANAGEMENT (OUTPATIENT)
Dept: CALL CENTER | Facility: HOSPITAL | Age: 59
End: 2019-08-03

## 2019-08-03 NOTE — OUTREACH NOTE
Medical Week 1 Survey      Responses   Facility patient discharged from?  [facility]   Does the patient have one of the following disease processes/diagnoses(primary or secondary)?  Other   Is there a successful TCM telephone encounter documented?  No   Week 1 attempt successful?  Yes   Call start time  1231   Call end time  1239   Discharge diagnosis  CVA (cerebral vascular accident r/o.... Impaired mobility and ADLs    Person spoke with today (if not patient) and relationship  Cliff, shannan other   Meds reviewed with patient/caregiver?  Yes   Is the patient having any side effects they believe may be caused by any medication additions or changes?  No   Does the patient have all medications ordered at discharge?  Yes   Is the patient taking all medications as directed (includes completed medication regime)?  Yes   Does the patient have a primary care provider?   Yes   Does the patient have an appointment with their PCP within 7 days of discharge?  Greater than 7 days   What is preventing the patient from scheduling follow up appointments within 7 days of discharge?  Earlier appointment not available   Has the patient kept scheduled appointments due by today?  N/A   What is the Home health agency?   [facility]   Has home health visited the patient within 72 hours of discharge?  Yes   Psychosocial issues?  No   Comments  Cliff states pt is sleepy but no signs of sx activity, etc, states BP higher than normal before starting on new Lisinopril med, advised Cliff to have pt monitor BP daily and consult HH nurse if needed, Cliff agreed with plan   Did the patient receive a copy of their discharge instructions?  Yes   Nursing interventions  Reviewed instructions with patient   What is the patient's perception of their health status since discharge?  Improving   Is the patient/caregiver able to teach back signs and symptoms related to disease process for when to call PCP?  Yes   Is the patient/caregiver able to teach back  signs and symptoms related to disease process for when to call 911?  Yes   Is the patient/caregiver able to teach back the hierarchy of who to call/visit for symptoms/problems? PCP, Specialist, Home health nurse, Urgent Care, ED, 911  Yes   Additional teach back comments  pain under control according to Cliff   Week 1 call completed?  Yes          Lisette Little RN

## 2019-08-05 ENCOUNTER — HOSPITAL ENCOUNTER (INPATIENT)
Facility: HOSPITAL | Age: 59
LOS: 3 days | Discharge: HOME | End: 2019-08-08
Attending: FAMILY MEDICINE | Admitting: HOSPITALIST

## 2019-08-05 ENCOUNTER — APPOINTMENT (OUTPATIENT)
Dept: GENERAL RADIOLOGY | Facility: HOSPITAL | Age: 59
End: 2019-08-05

## 2019-08-05 ENCOUNTER — APPOINTMENT (OUTPATIENT)
Dept: CT IMAGING | Facility: HOSPITAL | Age: 59
End: 2019-08-05

## 2019-08-05 DIAGNOSIS — T68.XXXA HYPOTHERMIA, INITIAL ENCOUNTER: Primary | ICD-10-CM

## 2019-08-05 DIAGNOSIS — K52.9 ENTERITIS: ICD-10-CM

## 2019-08-05 LAB
6-ACETYL MORPHINE: NEGATIVE
ALBUMIN SERPL-MCNC: 3.34 G/DL (ref 3.5–5.2)
ALBUMIN/GLOB SERPL: 0.9 G/DL
ALP SERPL-CCNC: 134 U/L (ref 39–117)
ALT SERPL W P-5'-P-CCNC: 14 U/L (ref 1–33)
AMPHET+METHAMPHET UR QL: NEGATIVE
ANION GAP SERPL CALCULATED.3IONS-SCNC: 15.7 MMOL/L (ref 5–15)
AST SERPL-CCNC: 17 U/L (ref 1–32)
BACTERIA UR QL AUTO: ABNORMAL /HPF
BARBITURATES UR QL SCN: NEGATIVE
BASOPHILS # BLD AUTO: 0.02 10*3/MM3 (ref 0–0.2)
BASOPHILS NFR BLD AUTO: 0.4 % (ref 0–1.5)
BENZODIAZ UR QL SCN: NEGATIVE
BILIRUB SERPL-MCNC: 0.3 MG/DL (ref 0.2–1.2)
BILIRUB UR QL STRIP: NEGATIVE
BUN BLD-MCNC: 33 MG/DL (ref 8–23)
BUN/CREAT SERPL: 27.7 (ref 7–25)
BUPRENORPHINE SERPL-MCNC: NEGATIVE NG/ML
CALCIUM SPEC-SCNC: 8.9 MG/DL (ref 8.6–10.5)
CANNABINOIDS SERPL QL: NEGATIVE
CHLORIDE SERPL-SCNC: 98 MMOL/L (ref 98–107)
CLARITY UR: ABNORMAL
CO2 SERPL-SCNC: 25.3 MMOL/L (ref 22–29)
COCAINE UR QL: NEGATIVE
COLOR UR: YELLOW
CORTIS SERPL-MCNC: 17.47 MCG/DL
CREAT BLD-MCNC: 1.19 MG/DL (ref 0.57–1)
CRP SERPL-MCNC: 2.94 MG/DL (ref 0–0.5)
D-LACTATE SERPL-SCNC: 1.5 MMOL/L (ref 0.5–2)
DEPRECATED RDW RBC AUTO: 48.8 FL (ref 37–54)
EOSINOPHIL # BLD AUTO: 0.01 10*3/MM3 (ref 0–0.4)
EOSINOPHIL NFR BLD AUTO: 0.2 % (ref 0.3–6.2)
ERYTHROCYTE [DISTWIDTH] IN BLOOD BY AUTOMATED COUNT: 15.2 % (ref 12.3–15.4)
ETHANOL BLD-MCNC: <10 MG/DL (ref 0–10)
ETHANOL UR QL: <0.01 %
GFR SERPL CREATININE-BSD FRML MDRD: 46 ML/MIN/1.73
GLOBULIN UR ELPH-MCNC: 3.7 GM/DL
GLUCOSE BLD-MCNC: 178 MG/DL (ref 65–99)
GLUCOSE BLDC GLUCOMTR-MCNC: 113 MG/DL (ref 70–130)
GLUCOSE BLDC GLUCOMTR-MCNC: 125 MG/DL (ref 70–130)
GLUCOSE BLDC GLUCOMTR-MCNC: 144 MG/DL (ref 70–130)
GLUCOSE BLDC GLUCOMTR-MCNC: 149 MG/DL (ref 70–130)
GLUCOSE BLDC GLUCOMTR-MCNC: 81 MG/DL (ref 70–130)
GLUCOSE UR STRIP-MCNC: NEGATIVE MG/DL
HCT VFR BLD AUTO: 41.1 % (ref 34–46.6)
HGB BLD-MCNC: 13.2 G/DL (ref 12–15.9)
HGB UR QL STRIP.AUTO: NEGATIVE
HYALINE CASTS UR QL AUTO: ABNORMAL /LPF
IMM GRANULOCYTES # BLD AUTO: 0.05 10*3/MM3 (ref 0–0.05)
IMM GRANULOCYTES NFR BLD AUTO: 0.9 % (ref 0–0.5)
KETONES UR QL STRIP: NEGATIVE
LEUKOCYTE ESTERASE UR QL STRIP.AUTO: NEGATIVE
LYMPHOCYTES # BLD AUTO: 1.76 10*3/MM3 (ref 0.7–3.1)
LYMPHOCYTES NFR BLD AUTO: 31.9 % (ref 19.6–45.3)
MAGNESIUM SERPL-MCNC: 2 MG/DL (ref 1.6–2.4)
MCH RBC QN AUTO: 29 PG (ref 26.6–33)
MCHC RBC AUTO-ENTMCNC: 32.1 G/DL (ref 31.5–35.7)
MCV RBC AUTO: 90.3 FL (ref 79–97)
METHADONE UR QL SCN: NEGATIVE
MONOCYTES # BLD AUTO: 0.27 10*3/MM3 (ref 0.1–0.9)
MONOCYTES NFR BLD AUTO: 4.9 % (ref 5–12)
NEUTROPHILS # BLD AUTO: 3.4 10*3/MM3 (ref 1.7–7)
NEUTROPHILS NFR BLD AUTO: 61.7 % (ref 42.7–76)
NITRITE UR QL STRIP: NEGATIVE
OPIATES UR QL: NEGATIVE
OXYCODONE UR QL SCN: NEGATIVE
PCP UR QL SCN: NEGATIVE
PH UR STRIP.AUTO: <=5 [PH] (ref 5–8)
PLATELET # BLD AUTO: 160 10*3/MM3 (ref 140–450)
PMV BLD AUTO: 10.8 FL (ref 6–12)
POTASSIUM BLD-SCNC: 3.8 MMOL/L (ref 3.5–5.2)
PROT SERPL-MCNC: 7 G/DL (ref 6–8.5)
PROT UR QL STRIP: ABNORMAL
RBC # BLD AUTO: 4.55 10*6/MM3 (ref 3.77–5.28)
RBC # UR: ABNORMAL /HPF
REF LAB TEST METHOD: ABNORMAL
SODIUM BLD-SCNC: 139 MMOL/L (ref 136–145)
SP GR UR STRIP: 1.01 (ref 1–1.03)
SQUAMOUS #/AREA URNS HPF: ABNORMAL /HPF
TROPONIN T SERPL-MCNC: <0.01 NG/ML (ref 0–0.03)
TSH SERPL DL<=0.05 MIU/L-ACNC: 6.06 MIU/ML (ref 0.27–4.2)
UROBILINOGEN UR QL STRIP: ABNORMAL
VALPROATE SERPL-MCNC: 99 MCG/ML (ref 50–125)
WBC NRBC COR # BLD: 5.51 10*3/MM3 (ref 3.4–10.8)
WBC UR QL AUTO: ABNORMAL /HPF

## 2019-08-05 PROCEDURE — 80307 DRUG TEST PRSMV CHEM ANLYZR: CPT | Performed by: PHYSICIAN ASSISTANT

## 2019-08-05 PROCEDURE — 80164 ASSAY DIPROPYLACETIC ACD TOT: CPT | Performed by: PHYSICIAN ASSISTANT

## 2019-08-05 PROCEDURE — 83605 ASSAY OF LACTIC ACID: CPT | Performed by: PHYSICIAN ASSISTANT

## 2019-08-05 PROCEDURE — 71250 CT THORAX DX C-: CPT

## 2019-08-05 PROCEDURE — 71045 X-RAY EXAM CHEST 1 VIEW: CPT

## 2019-08-05 PROCEDURE — 74176 CT ABD & PELVIS W/O CONTRAST: CPT | Performed by: RADIOLOGY

## 2019-08-05 PROCEDURE — 87040 BLOOD CULTURE FOR BACTERIA: CPT | Performed by: PHYSICIAN ASSISTANT

## 2019-08-05 PROCEDURE — 74176 CT ABD & PELVIS W/O CONTRAST: CPT

## 2019-08-05 PROCEDURE — 87086 URINE CULTURE/COLONY COUNT: CPT | Performed by: HOSPITALIST

## 2019-08-05 PROCEDURE — 82962 GLUCOSE BLOOD TEST: CPT

## 2019-08-05 PROCEDURE — 86140 C-REACTIVE PROTEIN: CPT | Performed by: PHYSICIAN ASSISTANT

## 2019-08-05 PROCEDURE — 84484 ASSAY OF TROPONIN QUANT: CPT | Performed by: PHYSICIAN ASSISTANT

## 2019-08-05 PROCEDURE — 93005 ELECTROCARDIOGRAM TRACING: CPT | Performed by: PHYSICIAN ASSISTANT

## 2019-08-05 PROCEDURE — 71045 X-RAY EXAM CHEST 1 VIEW: CPT | Performed by: RADIOLOGY

## 2019-08-05 PROCEDURE — 25010000002 CEFTRIAXONE: Performed by: PHYSICIAN ASSISTANT

## 2019-08-05 PROCEDURE — 99285 EMERGENCY DEPT VISIT HI MDM: CPT

## 2019-08-05 PROCEDURE — 82533 TOTAL CORTISOL: CPT | Performed by: HOSPITALIST

## 2019-08-05 PROCEDURE — 71250 CT THORAX DX C-: CPT | Performed by: RADIOLOGY

## 2019-08-05 PROCEDURE — 81001 URINALYSIS AUTO W/SCOPE: CPT | Performed by: PHYSICIAN ASSISTANT

## 2019-08-05 PROCEDURE — 80177 DRUG SCRN QUAN LEVETIRACETAM: CPT | Performed by: HOSPITALIST

## 2019-08-05 PROCEDURE — 80050 GENERAL HEALTH PANEL: CPT | Performed by: PHYSICIAN ASSISTANT

## 2019-08-05 PROCEDURE — 83735 ASSAY OF MAGNESIUM: CPT | Performed by: PHYSICIAN ASSISTANT

## 2019-08-05 RX ORDER — SODIUM CHLORIDE 9 MG/ML
50 INJECTION, SOLUTION INTRAVENOUS CONTINUOUS
Status: DISCONTINUED | OUTPATIENT
Start: 2019-08-05 | End: 2019-08-07

## 2019-08-05 RX ORDER — SODIUM CHLORIDE 0.9 % (FLUSH) 0.9 %
10 SYRINGE (ML) INJECTION AS NEEDED
Status: DISCONTINUED | OUTPATIENT
Start: 2019-08-05 | End: 2019-08-08 | Stop reason: HOSPADM

## 2019-08-05 RX ORDER — HEPARIN SODIUM 5000 [USP'U]/ML
5000 INJECTION, SOLUTION INTRAVENOUS; SUBCUTANEOUS EVERY 12 HOURS SCHEDULED
Status: DISCONTINUED | OUTPATIENT
Start: 2019-08-06 | End: 2019-08-08 | Stop reason: HOSPADM

## 2019-08-05 RX ORDER — SODIUM CHLORIDE 0.9 % (FLUSH) 0.9 %
3-10 SYRINGE (ML) INJECTION AS NEEDED
Status: DISCONTINUED | OUTPATIENT
Start: 2019-08-05 | End: 2019-08-08 | Stop reason: HOSPADM

## 2019-08-05 RX ORDER — SODIUM CHLORIDE 0.9 % (FLUSH) 0.9 %
3 SYRINGE (ML) INJECTION EVERY 12 HOURS SCHEDULED
Status: DISCONTINUED | OUTPATIENT
Start: 2019-08-06 | End: 2019-08-08 | Stop reason: HOSPADM

## 2019-08-05 RX ORDER — SODIUM CHLORIDE 9 MG/ML
75 INJECTION, SOLUTION INTRAVENOUS CONTINUOUS
Status: CANCELLED | OUTPATIENT
Start: 2019-08-05

## 2019-08-05 RX ADMIN — METRONIDAZOLE 500 MG: 500 INJECTION, SOLUTION INTRAVENOUS at 19:37

## 2019-08-05 RX ADMIN — SODIUM CHLORIDE 500 ML: 9 INJECTION, SOLUTION INTRAVENOUS at 12:56

## 2019-08-05 RX ADMIN — CEFTRIAXONE 2 G: 2 INJECTION, POWDER, FOR SOLUTION INTRAMUSCULAR; INTRAVENOUS at 14:35

## 2019-08-05 RX ADMIN — SODIUM CHLORIDE 125 ML/HR: 9 INJECTION, SOLUTION INTRAVENOUS at 12:02

## 2019-08-05 RX ADMIN — SODIUM CHLORIDE 1000 ML: 9 INJECTION, SOLUTION INTRAVENOUS at 15:32

## 2019-08-05 NOTE — ED PROVIDER NOTES
Subjective   59-year-old female presents to the ED today for hypothermia.  She states she slept really well last night and felt her normal self when she woke up this morning she felt extremely cold.  She states her temperature was 90.2 at home.  She took this orally.  She states she thought she might have been hypoglycemic so she ate but that did not improve her temperature.  Her family called the ambulance who brought her here.  The patient denies any cough.  She denies any chest pain or shortness of breath.  She denies any nausea, vomiting or diarrhea.  She denies any abdominal pain.  She denies any urinary symptoms.  She states she has had 2 episodes of hypothermia in the past.  She states it happened once when she broke her leg and it happened more recently while she was admitted to [facility].  She states she was there 2 weeks ago because they thought she was having a stroke and then she had a seizure.  She states she had a complete neurologic work-up that came back negative.  They started her on Depakote and Keppra for seizures.  She states they were unable to find out why she had the episode of hypothermia.        History provided by:  Patient  Cold Exposure   Severity:  Severe  Onset quality:  Sudden  Duration: started this morning.  Timing:  Constant  Progression:  Improving  Chronicity:  Recurrent  Associated symptoms: no abdominal pain, no chest pain, no congestion, no cough, no diarrhea, no ear pain, no fatigue, no fever, no headaches, no loss of consciousness, no myalgias, no nausea, no rash, no rhinorrhea, no shortness of breath, no sore throat, no vomiting and no wheezing        Review of Systems   Constitutional: Positive for chills. Negative for appetite change, fatigue and fever.   HENT: Negative for congestion, ear pain, rhinorrhea and sore throat.    Eyes: Negative.    Respiratory: Negative for cough, shortness of breath and wheezing.    Cardiovascular: Negative for chest pain.    Gastrointestinal: Negative for abdominal pain, diarrhea, nausea and vomiting.   Genitourinary: Negative.    Musculoskeletal: Negative for myalgias.   Skin: Negative for rash.   Neurological: Negative for loss of consciousness and headaches.   Psychiatric/Behavioral: Negative.    All other systems reviewed and are negative.      Past Medical History:   Diagnosis Date   • Arthritis    • Closed fracture of right fibula and tibia 2018   • Depression    • Diabetic ulcer of left foot associated with type 2 diabetes mellitus (CMS/[facility]) 2017   • Diastolic dysfunction    • Essential hypertension    • Hyperlipidemia    • Iron deficiency anemia 2018   • PAD (peripheral artery disease) (CMS/[facility])    • Type 2 diabetes mellitus with hyperglycemia, with long-term current use of insulin (CMS/[facility]) 2018       Allergies   Allergen Reactions   • Penicillins Other (See Comments)     Patient has received cefazolin, ceftriaxone and cefepime during past admissions.   • Codeine Rash     Pt tolerates Norco @ home   • Sulfa Antibiotics Rash       Past Surgical History:   Procedure Laterality Date   • BACK SURGERY      Post spinal diskectomy, osteophytectomy in one lumbar interspace   • CATARACT EXTRACTION      Left    •  SECTION     • DENTAL PROCEDURE     • HYSTERECTOMY     • INCISION AND DRAINAGE LEG Left 4/15/2017    Procedure: INCISION AND DRAINAGE LOWER EXTREMITY;  Surgeon: Basilio Morris MD;  Location: [facility] OR;  Service:    • INCISION AND DRAINAGE LEG Left 2017    Procedure: Irrigation and Debridment abcess diabetic wound left foot with deep culture;  Surgeon: Basilio Morris MD;  Location: [facility] OR;  Service:    • KNEE ARTHROSCOPY Right    • ORIF TIBIA FRACTURE Right 2018    Procedure: TIBIAL  FRACTURE OPEN REDUCTION INTERNAL FIXATION;  Surgeon: Jung Barragan MD;  Location: [facility] OR;  Service: Orthopedics   • TOE AMPUTATION Right        • TUBAL ABDOMINAL LIGATION         Family History    Problem Relation Age of Onset   • Heart disease Mother    • Cancer Mother    • COPD Mother    • Diabetes Other    • Depression Other        Social History     Socioeconomic History   • Marital status:      Spouse name: Not on file   • Number of children: Not on file   • Years of education: Not on file   • Highest education level: Not on file   Tobacco Use   • Smoking status: Never Smoker   • Smokeless tobacco: Never Used   Substance and Sexual Activity   • Alcohol use: No   • Drug use: No   • Sexual activity: Defer           Objective   Physical Exam   Constitutional: She is oriented to person, place, and time. She appears well-developed and well-nourished. No distress.   HENT:   Head: Normocephalic and atraumatic.   Right Ear: External ear normal.   Left Ear: External ear normal.   Nose: Nose normal.   Mouth/Throat: Oropharynx is clear and moist.   Eyes: Conjunctivae and EOM are normal. Pupils are equal, round, and reactive to light.   Neck: Normal range of motion. Neck supple.   Cardiovascular: Regular rhythm, normal heart sounds and intact distal pulses. Bradycardia present.   Pulmonary/Chest: Effort normal and breath sounds normal. No respiratory distress. She has no wheezes. She exhibits no tenderness.   Abdominal: Soft. Bowel sounds are normal. There is no tenderness. There is no rebound and no guarding.   Musculoskeletal: Normal range of motion.   Neurological: She is alert and oriented to person, place, and time.   Skin: Skin is dry. Capillary refill takes less than 2 seconds. There is pallor.   Psychiatric: She has a normal mood and affect. Her behavior is normal. Judgment and thought content normal.   Nursing note and vitals reviewed.      Procedures           ED Course  ED Course as of Aug 05 1934   Mon Aug 05, 2019   1119 Temp is 92.2 rectal, will apply warming blanket  []   1136 Dr. Tucekr notified of level 2 patient  []   1221 11:52 - sinus bradycardia, rate of 53, no acute ischemia,  reviewed by Dr. Tucker ECG 12 Lead [AH]   1416 Dr. Tuckre has seen and evaluated the patient, recommended CT of chest and abd/pelvis.  [AH]   1527 Patient has been sleeping, no acute distress.  Awaiting CT scans.  [AH]   1611 Patient is going to CT at this time.  [AH]   1729 I have notified CT tech that I do not have a reading yet on her CT scans.  They will contact the radiologist.  [AH]   1755 Patient awake and alert, no distress.  Awaiting CT readings, there are currently issues with transmitting images.  Patient is aware of the delay.  She states she is hungry and ready to eat, tray has been ordered.  [AH]   1847 No acute findings on CT of chest.  CT abd/pelvis shows nonspecific bowel wall thickening in LUQ.  Will discuss with hospitalist service about admission.  [AH]   1933 I have discussed with Dr. Tatum who will admit.  [AH]      ED Course User Index  [] Anu Bennett PA                  MDM  Number of Diagnoses or Management Options  Enteritis:   Hypothermia, initial encounter:      Amount and/or Complexity of Data Reviewed  Clinical lab tests: reviewed  Tests in the radiology section of CPT®: reviewed  Tests in the medicine section of CPT®: reviewed  Decide to obtain previous medical records or to obtain history from someone other than the patient: yes  Discuss the patient with other providers: yes    Patient Progress  Patient progress: improved        Final diagnoses:   Hypothermia, initial encounter   Enteritis            Anu Bennett PA  08/05/19 1934

## 2019-08-06 ENCOUNTER — READMISSION MANAGEMENT (OUTPATIENT)
Dept: CALL CENTER | Facility: HOSPITAL | Age: 59
End: 2019-08-06

## 2019-08-06 LAB
ALBUMIN SERPL-MCNC: 2.46 G/DL (ref 3.5–5.2)
ALBUMIN/GLOB SERPL: 0.8 G/DL
ALP SERPL-CCNC: 104 U/L (ref 39–117)
ALT SERPL W P-5'-P-CCNC: 11 U/L (ref 1–33)
ANION GAP SERPL CALCULATED.3IONS-SCNC: 14.1 MMOL/L (ref 5–15)
AST SERPL-CCNC: 21 U/L (ref 1–32)
BASOPHILS # BLD AUTO: 0.02 10*3/MM3 (ref 0–0.2)
BASOPHILS NFR BLD AUTO: 0.4 % (ref 0–1.5)
BILIRUB SERPL-MCNC: <0.2 MG/DL (ref 0.2–1.2)
BUN BLD-MCNC: 33 MG/DL (ref 8–23)
BUN/CREAT SERPL: 26.8 (ref 7–25)
CALCIUM SPEC-SCNC: 7.9 MG/DL (ref 8.6–10.5)
CHLORIDE SERPL-SCNC: 106 MMOL/L (ref 98–107)
CO2 SERPL-SCNC: 21.9 MMOL/L (ref 22–29)
CORTIS SERPL-MCNC: 14.24 MCG/DL
CREAT BLD-MCNC: 1.23 MG/DL (ref 0.57–1)
CRP SERPL-MCNC: 1.66 MG/DL (ref 0–0.5)
DEPRECATED RDW RBC AUTO: 50.6 FL (ref 37–54)
EOSINOPHIL # BLD AUTO: 0.16 10*3/MM3 (ref 0–0.4)
EOSINOPHIL NFR BLD AUTO: 3.2 % (ref 0.3–6.2)
ERYTHROCYTE [DISTWIDTH] IN BLOOD BY AUTOMATED COUNT: 15.5 % (ref 12.3–15.4)
GFR SERPL CREATININE-BSD FRML MDRD: 44 ML/MIN/1.73
GLOBULIN UR ELPH-MCNC: 2.9 GM/DL
GLUCOSE BLD-MCNC: 203 MG/DL (ref 65–99)
GLUCOSE BLDC GLUCOMTR-MCNC: 159 MG/DL (ref 70–130)
GLUCOSE BLDC GLUCOMTR-MCNC: 166 MG/DL (ref 70–130)
GLUCOSE BLDC GLUCOMTR-MCNC: 171 MG/DL (ref 70–130)
GLUCOSE BLDC GLUCOMTR-MCNC: 174 MG/DL (ref 70–130)
GLUCOSE BLDC GLUCOMTR-MCNC: 230 MG/DL (ref 70–130)
GLUCOSE BLDC GLUCOMTR-MCNC: 319 MG/DL (ref 70–130)
HCT VFR BLD AUTO: 35.2 % (ref 34–46.6)
HGB BLD-MCNC: 10.8 G/DL (ref 12–15.9)
IMM GRANULOCYTES # BLD AUTO: 0.05 10*3/MM3 (ref 0–0.05)
IMM GRANULOCYTES NFR BLD AUTO: 1 % (ref 0–0.5)
LYMPHOCYTES # BLD AUTO: 1.51 10*3/MM3 (ref 0.7–3.1)
LYMPHOCYTES NFR BLD AUTO: 30.3 % (ref 19.6–45.3)
MCH RBC QN AUTO: 29 PG (ref 26.6–33)
MCHC RBC AUTO-ENTMCNC: 30.7 G/DL (ref 31.5–35.7)
MCV RBC AUTO: 94.4 FL (ref 79–97)
MONOCYTES # BLD AUTO: 0.55 10*3/MM3 (ref 0.1–0.9)
MONOCYTES NFR BLD AUTO: 11 % (ref 5–12)
NEUTROPHILS # BLD AUTO: 2.69 10*3/MM3 (ref 1.7–7)
NEUTROPHILS NFR BLD AUTO: 54.1 % (ref 42.7–76)
NT-PROBNP SERPL-MCNC: 351.3 PG/ML (ref 5–900)
PLATELET # BLD AUTO: 122 10*3/MM3 (ref 140–450)
PMV BLD AUTO: 11.3 FL (ref 6–12)
POTASSIUM BLD-SCNC: 3.7 MMOL/L (ref 3.5–5.2)
PROT SERPL-MCNC: 5.4 G/DL (ref 6–8.5)
RBC # BLD AUTO: 3.73 10*6/MM3 (ref 3.77–5.28)
SODIUM BLD-SCNC: 142 MMOL/L (ref 136–145)
T3FREE SERPL-MCNC: 1.59 PG/ML (ref 2–4.4)
T4 FREE SERPL-MCNC: 0.83 NG/DL (ref 0.93–1.7)
TROPONIN T SERPL-MCNC: <0.01 NG/ML (ref 0–0.03)
WBC NRBC COR # BLD: 4.98 10*3/MM3 (ref 3.4–10.8)

## 2019-08-06 PROCEDURE — 63710000001 INSULIN ASPART PER 5 UNITS

## 2019-08-06 PROCEDURE — 63710000001 INSULIN ASPART PER 5 UNITS: Performed by: HOSPITALIST

## 2019-08-06 PROCEDURE — 85025 COMPLETE CBC W/AUTO DIFF WBC: CPT | Performed by: HOSPITALIST

## 2019-08-06 PROCEDURE — 82962 GLUCOSE BLOOD TEST: CPT

## 2019-08-06 PROCEDURE — 84484 ASSAY OF TROPONIN QUANT: CPT | Performed by: HOSPITALIST

## 2019-08-06 PROCEDURE — 80053 COMPREHEN METABOLIC PANEL: CPT | Performed by: HOSPITALIST

## 2019-08-06 PROCEDURE — 99223 1ST HOSP IP/OBS HIGH 75: CPT | Performed by: HOSPITALIST

## 2019-08-06 PROCEDURE — 84481 FREE ASSAY (FT-3): CPT | Performed by: HOSPITALIST

## 2019-08-06 PROCEDURE — 25010000002 HEPARIN (PORCINE) PER 1000 UNITS: Performed by: HOSPITALIST

## 2019-08-06 PROCEDURE — 84439 ASSAY OF FREE THYROXINE: CPT | Performed by: HOSPITALIST

## 2019-08-06 PROCEDURE — 82533 TOTAL CORTISOL: CPT | Performed by: HOSPITALIST

## 2019-08-06 PROCEDURE — 83880 ASSAY OF NATRIURETIC PEPTIDE: CPT | Performed by: HOSPITALIST

## 2019-08-06 PROCEDURE — 97162 PT EVAL MOD COMPLEX 30 MIN: CPT

## 2019-08-06 PROCEDURE — 86140 C-REACTIVE PROTEIN: CPT | Performed by: HOSPITALIST

## 2019-08-06 PROCEDURE — 97166 OT EVAL MOD COMPLEX 45 MIN: CPT

## 2019-08-06 PROCEDURE — 25010000002 CEFTRIAXONE: Performed by: HOSPITALIST

## 2019-08-06 PROCEDURE — 63710000001 INSULIN DETEMIR PER 5 UNITS: Performed by: HOSPITALIST

## 2019-08-06 RX ORDER — HYDRALAZINE HYDROCHLORIDE 25 MG/1
25 TABLET, FILM COATED ORAL EVERY 8 HOURS SCHEDULED
Status: DISCONTINUED | OUTPATIENT
Start: 2019-08-06 | End: 2019-08-06

## 2019-08-06 RX ORDER — HYDROCODONE BITARTRATE AND ACETAMINOPHEN 5; 325 MG/1; MG/1
1 TABLET ORAL EVERY 6 HOURS PRN
Status: DISCONTINUED | OUTPATIENT
Start: 2019-08-06 | End: 2019-08-08 | Stop reason: HOSPADM

## 2019-08-06 RX ORDER — CLONIDINE HYDROCHLORIDE 0.1 MG/1
0.1 TABLET ORAL EVERY 6 HOURS PRN
Status: DISCONTINUED | OUTPATIENT
Start: 2019-08-06 | End: 2019-08-08 | Stop reason: HOSPADM

## 2019-08-06 RX ORDER — NICOTINE POLACRILEX 4 MG
15 LOZENGE BUCCAL
Status: DISCONTINUED | OUTPATIENT
Start: 2019-08-06 | End: 2019-08-07

## 2019-08-06 RX ORDER — DIVALPROEX SODIUM 500 MG/1
1000 TABLET, DELAYED RELEASE ORAL EVERY 12 HOURS SCHEDULED
Status: DISCONTINUED | OUTPATIENT
Start: 2019-08-06 | End: 2019-08-06

## 2019-08-06 RX ORDER — CLOPIDOGREL BISULFATE 75 MG/1
75 TABLET ORAL DAILY
Status: DISCONTINUED | OUTPATIENT
Start: 2019-08-06 | End: 2019-08-06

## 2019-08-06 RX ORDER — METOPROLOL SUCCINATE 50 MG/1
50 TABLET, EXTENDED RELEASE ORAL DAILY
Status: DISCONTINUED | OUTPATIENT
Start: 2019-08-06 | End: 2019-08-06

## 2019-08-06 RX ORDER — PRAVASTATIN SODIUM 40 MG
20 TABLET ORAL NIGHTLY
Status: DISCONTINUED | OUTPATIENT
Start: 2019-08-07 | End: 2019-08-08 | Stop reason: HOSPADM

## 2019-08-06 RX ORDER — ESCITALOPRAM OXALATE 10 MG/1
10 TABLET ORAL DAILY
Status: CANCELLED | OUTPATIENT
Start: 2019-08-06

## 2019-08-06 RX ORDER — CEPHALEXIN 500 MG/1
500 CAPSULE ORAL EVERY 24 HOURS
Status: DISCONTINUED | OUTPATIENT
Start: 2019-08-06 | End: 2019-08-06

## 2019-08-06 RX ORDER — ROPINIROLE 0.25 MG/1
0.5 TABLET, FILM COATED ORAL 2 TIMES DAILY
Status: DISCONTINUED | OUTPATIENT
Start: 2019-08-06 | End: 2019-08-08 | Stop reason: HOSPADM

## 2019-08-06 RX ORDER — GABAPENTIN 100 MG/1
200 CAPSULE ORAL EVERY 12 HOURS SCHEDULED
Status: DISCONTINUED | OUTPATIENT
Start: 2019-08-06 | End: 2019-08-08 | Stop reason: HOSPADM

## 2019-08-06 RX ORDER — BUSPIRONE HYDROCHLORIDE 10 MG/1
10 TABLET ORAL EVERY 8 HOURS
Status: DISCONTINUED | OUTPATIENT
Start: 2019-08-06 | End: 2019-08-08 | Stop reason: HOSPADM

## 2019-08-06 RX ORDER — ESCITALOPRAM OXALATE 10 MG/1
10 TABLET ORAL DAILY
Status: DISCONTINUED | OUTPATIENT
Start: 2019-08-07 | End: 2019-08-08 | Stop reason: HOSPADM

## 2019-08-06 RX ORDER — FLUOXETINE HYDROCHLORIDE 20 MG/1
20 CAPSULE ORAL DAILY
Status: DISCONTINUED | OUTPATIENT
Start: 2019-08-06 | End: 2019-08-06

## 2019-08-06 RX ORDER — PRAVASTATIN SODIUM 20 MG
20 TABLET ORAL DAILY
COMMUNITY
End: 2022-07-10 | Stop reason: HOSPADM

## 2019-08-06 RX ORDER — ESCITALOPRAM OXALATE 10 MG/1
10 TABLET ORAL DAILY
Status: ON HOLD | COMMUNITY
End: 2021-11-05

## 2019-08-06 RX ORDER — DEXTROSE MONOHYDRATE 25 G/50ML
25 INJECTION, SOLUTION INTRAVENOUS
Status: DISCONTINUED | OUTPATIENT
Start: 2019-08-06 | End: 2019-08-06

## 2019-08-06 RX ORDER — ATORVASTATIN CALCIUM 40 MG/1
40 TABLET, FILM COATED ORAL DAILY
Status: DISCONTINUED | OUTPATIENT
Start: 2019-08-06 | End: 2019-08-06

## 2019-08-06 RX ORDER — CEPHALEXIN 500 MG/1
500 CAPSULE ORAL DAILY
Status: DISCONTINUED | OUTPATIENT
Start: 2019-08-07 | End: 2019-08-08 | Stop reason: HOSPADM

## 2019-08-06 RX ORDER — LEVOTHYROXINE SODIUM 0.03 MG/1
25 TABLET ORAL
Status: DISCONTINUED | OUTPATIENT
Start: 2019-08-07 | End: 2019-08-08 | Stop reason: HOSPADM

## 2019-08-06 RX ORDER — CARVEDILOL 6.25 MG/1
12.5 TABLET ORAL 2 TIMES DAILY WITH MEALS
Status: DISCONTINUED | OUTPATIENT
Start: 2019-08-06 | End: 2019-08-08 | Stop reason: HOSPADM

## 2019-08-06 RX ORDER — DIVALPROEX SODIUM 500 MG/1
500 TABLET, DELAYED RELEASE ORAL EVERY 12 HOURS SCHEDULED
Status: DISCONTINUED | OUTPATIENT
Start: 2019-08-06 | End: 2019-08-07

## 2019-08-06 RX ORDER — GABAPENTIN 400 MG/1
800 CAPSULE ORAL 3 TIMES DAILY
Status: CANCELLED | OUTPATIENT
Start: 2019-08-06

## 2019-08-06 RX ORDER — L.ACID,PARA/B.BIFIDUM/S.THERM 8B CELL
1 CAPSULE ORAL DAILY
Status: DISCONTINUED | OUTPATIENT
Start: 2019-08-06 | End: 2019-08-08 | Stop reason: HOSPADM

## 2019-08-06 RX ORDER — CLOPIDOGREL BISULFATE 75 MG/1
75 TABLET ORAL DAILY
Status: DISCONTINUED | OUTPATIENT
Start: 2019-08-06 | End: 2019-08-08 | Stop reason: HOSPADM

## 2019-08-06 RX ORDER — MULTIVITAMIN
1 TABLET ORAL DAILY
Status: DISCONTINUED | OUTPATIENT
Start: 2019-08-06 | End: 2019-08-06

## 2019-08-06 RX ORDER — HYDROCODONE BITARTRATE AND ACETAMINOPHEN 10; 325 MG/1; MG/1
1 TABLET ORAL EVERY 4 HOURS PRN
Status: DISCONTINUED | OUTPATIENT
Start: 2019-08-06 | End: 2019-08-08 | Stop reason: HOSPADM

## 2019-08-06 RX ORDER — NICOTINE POLACRILEX 4 MG
15 LOZENGE BUCCAL
Status: DISCONTINUED | OUTPATIENT
Start: 2019-08-06 | End: 2019-08-06

## 2019-08-06 RX ORDER — LISINOPRIL 10 MG/1
40 TABLET ORAL
Status: DISCONTINUED | OUTPATIENT
Start: 2019-08-06 | End: 2019-08-08 | Stop reason: HOSPADM

## 2019-08-06 RX ORDER — PRAVASTATIN SODIUM 40 MG
20 TABLET ORAL DAILY
Status: CANCELLED | OUTPATIENT
Start: 2019-08-06

## 2019-08-06 RX ORDER — PANTOPRAZOLE SODIUM 40 MG/1
40 TABLET, DELAYED RELEASE ORAL
Status: DISCONTINUED | OUTPATIENT
Start: 2019-08-06 | End: 2019-08-08 | Stop reason: HOSPADM

## 2019-08-06 RX ORDER — HYDRALAZINE HYDROCHLORIDE 25 MG/1
25 TABLET, FILM COATED ORAL EVERY 8 HOURS SCHEDULED
Status: DISCONTINUED | OUTPATIENT
Start: 2019-08-06 | End: 2019-08-07

## 2019-08-06 RX ORDER — DEXTROSE MONOHYDRATE 25 G/50ML
25 INJECTION, SOLUTION INTRAVENOUS
Status: DISCONTINUED | OUTPATIENT
Start: 2019-08-06 | End: 2019-08-07

## 2019-08-06 RX ORDER — GABAPENTIN 800 MG/1
800 TABLET ORAL 3 TIMES DAILY
Status: ON HOLD | COMMUNITY
End: 2019-08-08 | Stop reason: SDUPTHER

## 2019-08-06 RX ADMIN — PANTOPRAZOLE SODIUM 40 MG: 40 TABLET, DELAYED RELEASE ORAL at 09:26

## 2019-08-06 RX ADMIN — INSULIN ASPART 3 UNITS: 100 INJECTION, SOLUTION INTRAVENOUS; SUBCUTANEOUS at 12:35

## 2019-08-06 RX ADMIN — CEFTRIAXONE 1 G: 1 INJECTION, POWDER, FOR SOLUTION INTRAMUSCULAR; INTRAVENOUS at 13:41

## 2019-08-06 RX ADMIN — HYDRALAZINE HYDROCHLORIDE 25 MG: 25 TABLET ORAL at 21:02

## 2019-08-06 RX ADMIN — DIVALPROEX SODIUM 500 MG: 500 TABLET, DELAYED RELEASE ORAL at 21:02

## 2019-08-06 RX ADMIN — METRONIDAZOLE 500 MG: 500 INJECTION, SOLUTION INTRAVENOUS at 03:22

## 2019-08-06 RX ADMIN — FLUOXETINE 20 MG: 20 CAPSULE ORAL at 09:26

## 2019-08-06 RX ADMIN — INSULIN ASPART 5 UNITS: 100 INJECTION, SOLUTION INTRAVENOUS; SUBCUTANEOUS at 17:03

## 2019-08-06 RX ADMIN — BUSPIRONE HYDROCHLORIDE 10 MG: 10 TABLET ORAL at 09:25

## 2019-08-06 RX ADMIN — LEVETIRACETAM 750 MG: 500 TABLET, FILM COATED ORAL at 22:21

## 2019-08-06 RX ADMIN — LEVETIRACETAM 750 MG: 500 TABLET, FILM COATED ORAL at 09:28

## 2019-08-06 RX ADMIN — Medication 1 CAPSULE: at 13:20

## 2019-08-06 RX ADMIN — SODIUM CHLORIDE, PRESERVATIVE FREE 10 ML: 5 INJECTION INTRAVENOUS at 01:19

## 2019-08-06 RX ADMIN — HYDROCODONE BITARTRATE AND ACETAMINOPHEN 1 TABLET: 5; 325 TABLET ORAL at 05:21

## 2019-08-06 RX ADMIN — SODIUM CHLORIDE, PRESERVATIVE FREE 3 ML: 5 INJECTION INTRAVENOUS at 09:27

## 2019-08-06 RX ADMIN — Medication 1 TABLET: at 09:26

## 2019-08-06 RX ADMIN — METRONIDAZOLE 500 MG: 500 INJECTION, SOLUTION INTRAVENOUS at 12:35

## 2019-08-06 RX ADMIN — INSULIN ASPART 2 UNITS: 100 INJECTION, SOLUTION INTRAVENOUS; SUBCUTANEOUS at 21:04

## 2019-08-06 RX ADMIN — ROPINIROLE HYDROCHLORIDE 0.5 MG: 0.25 TABLET, FILM COATED ORAL at 22:22

## 2019-08-06 RX ADMIN — HEPARIN SODIUM 5000 UNITS: 5000 INJECTION INTRAVENOUS; SUBCUTANEOUS at 20:59

## 2019-08-06 RX ADMIN — HYDRALAZINE HYDROCHLORIDE 25 MG: 25 TABLET ORAL at 05:21

## 2019-08-06 RX ADMIN — BUSPIRONE HYDROCHLORIDE 10 MG: 10 TABLET ORAL at 17:03

## 2019-08-06 RX ADMIN — ATORVASTATIN CALCIUM 40 MG: 40 TABLET, FILM COATED ORAL at 09:25

## 2019-08-06 RX ADMIN — SODIUM CHLORIDE, PRESERVATIVE FREE 3 ML: 5 INJECTION INTRAVENOUS at 21:04

## 2019-08-06 RX ADMIN — GABAPENTIN 200 MG: 100 CAPSULE ORAL at 21:03

## 2019-08-06 RX ADMIN — METOPROLOL SUCCINATE 50 MG: 50 TABLET, FILM COATED, EXTENDED RELEASE ORAL at 09:26

## 2019-08-06 RX ADMIN — DIVALPROEX SODIUM 1000 MG: 500 TABLET, DELAYED RELEASE ORAL at 09:26

## 2019-08-06 RX ADMIN — HEPARIN SODIUM 5000 UNITS: 5000 INJECTION INTRAVENOUS; SUBCUTANEOUS at 01:19

## 2019-08-06 RX ADMIN — HYDRALAZINE HYDROCHLORIDE 25 MG: 25 TABLET ORAL at 14:46

## 2019-08-06 RX ADMIN — HEPARIN SODIUM 5000 UNITS: 5000 INJECTION INTRAVENOUS; SUBCUTANEOUS at 09:26

## 2019-08-06 RX ADMIN — GABAPENTIN 200 MG: 100 CAPSULE ORAL at 09:25

## 2019-08-06 RX ADMIN — ROPINIROLE HYDROCHLORIDE 0.5 MG: 0.25 TABLET, FILM COATED ORAL at 09:26

## 2019-08-06 RX ADMIN — LISINOPRIL 40 MG: 10 TABLET ORAL at 09:26

## 2019-08-06 RX ADMIN — CLONIDINE HYDROCHLORIDE 0.1 MG: 0.1 TABLET ORAL at 12:35

## 2019-08-06 RX ADMIN — CLOPIDOGREL 75 MG: 75 TABLET, FILM COATED ORAL at 09:26

## 2019-08-06 RX ADMIN — INSULIN DETEMIR 15 UNITS: 100 INJECTION, SOLUTION SUBCUTANEOUS at 21:05

## 2019-08-06 NOTE — PAYOR COMM NOTE
"  [facility]  NPI: [id number]    Utilization Review   Contact:Neli Delvalle MSN, APRN, NP-C  Phone: [phone]  Fax: [phone]    [employer]/Attn: nesha Denis Auth Req  REF: [id number]  DX: T68.XXXA, R65.10    Reny Merino (59 y.o. Female)     Date of Birth Social Security Number Address Home Phone MRN    1960  [address] [place] [id number] [phone] [id number]    Faith Marital Status          Islam        Admission Date Admission Type Admitting Provider Attending Provider Department, Room/Bed    19 Emergency Erwin Tatum MD Srinivas, Priyanka, MD [facility] PROGRESS CARE, P205/1P    Discharge Date Discharge Disposition Discharge Destination                       Attending Provider:  Samanta Ibarra MD    Allergies:  Penicillins, Codeine, Sulfa Antibiotics    Isolation:  None   Infection:  None   Code Status:  CPR    Ht:  165.1 cm (65\")   Wt:  56.8 kg (125 lb 3.5 oz)    Admission Cmt:  None   Principal Problem:  None                Active Insurance as of 2019     Primary Coverage     Payor Plan Insurance Group Employer/Plan Group    [employer] [place] [employer] [place]      Payor Plan Address Payor Plan Phone Number Payor Plan Fax Number Effective Dates    [address] [address] [phone]  2017 - None Entered    [facility] [id number]       Subscriber Name Subscriber Birth Date Member ID       RENY MERINO 1960 [id number]                 Emergency Contacts      (Rel.) Home Phone Work Phone Mobile Phone    Liza Oliva (Daughter) [phone] --    Cliff Leiva (Significant Other) [phone] -- --    Sarah Merino (Other) -- -- [phone]               History & Physical      Erwin Tatum MD at 2019  2:38 AM          Hospitalist History and Physical        Patient Identification  Name: Reny Merino  Age/Sex: 59 y.o. female  :  1960        MRN: [id number]  Visit Number: " [id number]  PCP: Yandel Paulson MD      Chief complaint freezing cold, low body temperature    History of Present Illness:  Patient is a 59 y.o. female with history of type II DM, diastolic dysfunction, HTN, PAD, and chronic osteomyelitis of the right lower extremity. She recently was hospitalized at [facility] from 7/24-8/1 for expressive aphasia initially suspected to be secondary to CVA. It was later suspected that her symptoms were due to nonconvulsive seizures based on EEG findings, and that the seizures were possibly related to cefepime which she had been receiving for chronic osteomyelitis. She was started on depakote and keppra. Cefepime was changed by ID to rocephin and later to keflex at time of discharge. She reports that while in the hospital she had two episodes of hypothermia, which her physicians could not explain.     She had been doing relatively well since discharge until the morning of 8/5, when she woke up feeling extremely cold. She checked her temperature and it was only 90 F. Her  tried to find her glucometer to check her glucose level but could not find it. She ate some jello and came on to the hospital. She states most of the time in the ED she was in a fog and cannot remember initial details. She was noted to be sleeping most of her ED stay earlier on but later was awake, alert and oriented. Per ED note, her temp was 92.2 rectal on arrival. Temperature improved with heating blanket. Glucose was 125. Creatinine was up slightly from baseline and BUN:cr ratio was 27. WBC was normal while CRP was up slightly at 2.94. Valproic acid level was normal at 99. UA showed 2+ bacteria, 3-5 WBC but was negative for nitrite or leuk esterase and patient denies any urinary symptoms. CXR and CT chest were unremarkable, while CT abdomen showed nonspecific LUQ small bowel wall thickening that could represent enteritis or other focal inflammatory process. Patient reportedly had some LUQ  tenderness on exam in the ED, though she denies any pain in the PCU and is non-tender on my exam now. Cortisol level is 17. Upon further questioning, patient recalls that at one time she may have been on fludricortisone b/c of low cortisol levels, but her cardiologist Dr Helton ultimately discontinued this medication. She has been admitted to the PCU for further workup and management.     Review of Systems  Review of Systems   Constitutional: Positive for chills, fatigue and fever. Negative for activity change, appetite change, diaphoresis and unexpected weight change.   HENT: Negative for congestion, postnasal drip, rhinorrhea, sinus pressure, sinus pain and sore throat.    Eyes: Negative for photophobia, pain, discharge, redness, itching and visual disturbance.   Respiratory: Negative for cough, shortness of breath and wheezing.    Cardiovascular: Negative for chest pain, palpitations and leg swelling.   Gastrointestinal: Negative for abdominal distention, abdominal pain, constipation, diarrhea, nausea and vomiting.   Endocrine: Negative for cold intolerance, heat intolerance, polydipsia, polyphagia and polyuria.   Genitourinary: Negative for difficulty urinating, dysuria, flank pain and hematuria.   Musculoskeletal: Positive for myalgias (right leg pain, chronic from chronic osteomyelitis). Negative for arthralgias, back pain and joint swelling.   Skin: Positive for wound (right leg surgical wound, appears to be healing well). Negative for color change, pallor and rash.   Allergic/Immunologic: Negative for environmental allergies, food allergies and immunocompromised state.   Neurological: Negative for dizziness, tremors, seizures, syncope, weakness, light-headedness, numbness and headaches.   Hematological: Negative for adenopathy. Does not bruise/bleed easily.   Psychiatric/Behavioral: Negative for agitation, behavioral problems and confusion.       History  Past Medical History:   Diagnosis Date   • Arthritis     • Closed fracture of right fibula and tibia 2018   • Depression    • Diabetic ulcer of left foot associated with type 2 diabetes mellitus (CMS/[facility]) 2017   • Diastolic dysfunction    • Essential hypertension    • Hyperlipidemia    • Iron deficiency anemia 2018   • PAD (peripheral artery disease) (CMS/[facility])    • Type 2 diabetes mellitus with hyperglycemia, with long-term current use of insulin (CMS/[facility]) 2018     Past Surgical History:   Procedure Laterality Date   • BACK SURGERY      Post spinal diskectomy, osteophytectomy in one lumbar interspace   • CATARACT EXTRACTION      Left    •  SECTION     • DENTAL PROCEDURE     • HYSTERECTOMY     • INCISION AND DRAINAGE LEG Left 4/15/2017    Procedure: INCISION AND DRAINAGE LOWER EXTREMITY;  Surgeon: Basilio Morris MD;  Location: [facility] OR;  Service:    • INCISION AND DRAINAGE LEG Left 2017    Procedure: Irrigation and Debridment abcess diabetic wound left foot with deep culture;  Surgeon: Basilio Morris MD;  Location: [facility] OR;  Service:    • KNEE ARTHROSCOPY Right    • ORIF TIBIA FRACTURE Right 2018    Procedure: TIBIAL  FRACTURE OPEN REDUCTION INTERNAL FIXATION;  Surgeon: Jung Barragan MD;  Location: [facility] OR;  Service: Orthopedics   • TOE AMPUTATION Right     5th   • TUBAL ABDOMINAL LIGATION       Family History   Problem Relation Age of Onset   • Heart disease Mother    • Cancer Mother    • COPD Mother    • Diabetes Other    • Depression Other      Social History     Tobacco Use   • Smoking status: Never Smoker   • Smokeless tobacco: Never Used   Substance Use Topics   • Alcohol use: No     Frequency: Never   • Drug use: No     Medications Prior to Admission   Medication Sig Dispense Refill Last Dose   • atorvastatin (LIPITOR) 40 MG tablet Take 1 tablet by mouth Daily. 30 tablet 0 Taking   • busPIRone (BUSPAR) 10 MG tablet Take 1 tablet by mouth Every 8 (Eight) Hours.   Taking   • Cephalexin 500 MG tablet Take 500 mg by  mouth Daily. 30 tablet 0    • CloNIDine (CATAPRES) 0.1 MG tablet Take 0.1 mg by mouth Every 6 (Six) Hours As Needed for High Blood Pressure (greater than 160/90).   Not Taking   • clopidogrel (PLAVIX) 75 MG tablet Take 1 tablet by mouth Daily. 30 tablet 4 Taking   • divalproex (DEPAKOTE) 500 MG DR tablet Take 2 tablets by mouth Every 12 (Twelve) Hours. 120 tablet 0    • FLUoxetine (PROzac) 20 MG capsule Take 20 mg by mouth.   Taking   • furosemide (LASIX) 20 MG tablet Take 1 tablet by mouth Daily As Needed (Increased shortness of breath or swelling.). 30 tablet 0 Taking   • gabapentin (NEURONTIN) 800 MG tablet Take 1 tablet by mouth Every 12 (Twelve) Hours.      • hydrALAZINE (APRESOLINE) 25 MG tablet Take 1 tablet by mouth Every 8 (Eight) Hours. 90 tablet 0 Taking   • HYDROcodone-acetaminophen (NORCO)  MG per tablet Take 1 tablet by mouth Every 4 (Four) Hours As Needed for Mild Pain . Pharmacy directions states every 4 to 6 hours prn pain.   Taking   • hydrOXYzine (ATARAX) 25 MG tablet Take 25 mg by mouth Daily As Needed for Anxiety.   Taking   • insulin aspart (novoLOG) 100 UNIT/ML injection Sliding scale TID with meals. Glucose 150-199:2 units. 240-249: 3 units. 250-299- 4 units. 300-349- 5 units. 350-400 :6 units. Over 400: 7un (Patient taking differently: Inject 0-6 Units under the skin into the appropriate area as directed 3 (Three) Times a Day Before Meals. Sliding scale TID with meals. Glucose 150-199:2 units. 240-249: 3 units. 250-299- 4 units. 300-349- 5 units. 350-400 :6 units. Over 400: 7un)  12 Taking   • Insulin Glargine (BASAGLAR KWIKPEN) 100 UNIT/ML injection pen Inject 25 Units under the skin into the appropriate area as directed 2 (Two) Times a Day. 1 mL 12 Taking   • levETIRAcetam (KEPPRA) 750 MG tablet Take 1 tablet by mouth Every 12 (Twelve) Hours. 60 tablet 0    • lisinopril (PRINIVIL,ZESTRIL) 40 MG tablet Take 1 tablet by mouth Daily. 30 tablet 0    • metoprolol succinate XL (TOPROL-XL)  50 MG 24 hr tablet Take 1 tablet by mouth Daily. 30 tablet 5 Taking   • multivitamin (DAILY FRAN) tablet tablet Take 1 tablet by mouth Daily. 30 tablet 0 Taking   • pantoprazole (PROTONIX) 40 MG EC tablet Take 40 mg by mouth Daily.   Taking   • potassium chloride (K-DUR) 10 MEQ CR tablet Take 1 tablet by mouth Daily As Needed (Only take on days you take Lasix.). 30 tablet 0 Taking   • rOPINIRole (REQUIP) 0.5 MG tablet Take 0.5 mg by mouth 2 (Two) Times a Day. Take 1 hour before bedtime.   Not Taking   • temazepam (RESTORIL) 30 MG capsule Take 30 mg by mouth Every Night.   Taking   • tiZANidine (ZANAFLEX) 4 MG tablet Take 4 mg by mouth Every 6 (Six) Hours As Needed for Muscle Spasms.   Taking   • vitamin D (ERGOCALCIFEROL) 81884 units capsule capsule Take 50,000 Units by mouth 1 (One) Time Per Week. Prior to [facility] Admission, Patient was on: takes on wednesdays   Not Taking     Allergies:  Penicillins; Codeine; and Sulfa antibiotics    Objective     Vital Signs  Temp:  [92.2 °F (33.4 °C)-98.2 °F (36.8 °C)] 98 °F (36.7 °C)  Heart Rate:  [51-64] 64  Resp:  [16-20] 18  BP: ()/(49-99) 143/64  Body mass index is 20.45 kg/m².    Physical Exam:  Physical Exam   Constitutional: She is oriented to person, place, and time.   Chronically ill appearing female, pleasant, no acute distress   HENT:   Head: Normocephalic and atraumatic.   Mouth/Throat: Oropharynx is clear and moist.   Eyes: Conjunctivae and EOM are normal. Pupils are equal, round, and reactive to light.   Neck: Normal range of motion. Neck supple. No JVD present.   Cardiovascular: Normal rate, regular rhythm, normal heart sounds and intact distal pulses.   No murmur heard.  Pulmonary/Chest: Effort normal and breath sounds normal. No respiratory distress. She has no wheezes. She has no rales. She exhibits no tenderness.   Abdominal: Soft. Bowel sounds are normal. She exhibits no distension. There is no tenderness.   Musculoskeletal: Normal range of motion. She  exhibits deformity (right leg with protective splint and wrapped in ACE bandage; removal of bandage reveals surgical incisions of various ages which appear to be healing fairly well). She exhibits no edema.   Lymphadenopathy:     She has no cervical adenopathy.   Neurological: She is alert and oriented to person, place, and time.   No gross focal deficits appreciated   Skin: Skin is warm and dry. Capillary refill takes less than 2 seconds. No rash noted. No erythema. No pallor.   See surgical wounds above   Psychiatric: She has a normal mood and affect. Her behavior is normal. Judgment and thought content normal.         Results Review:       Lab Results:  Results from last 7 days   Lab Units 08/05/19  1144 07/30/19  0459   WBC 10*3/mm3 5.51 6.73   HEMOGLOBIN g/dL 13.2 9.8*   PLATELETS 10*3/mm3 160 140     Results from last 7 days   Lab Units 08/05/19  1144 07/30/19  0539   CRP mg/dL 2.94* 0.15     Results from last 7 days   Lab Units 08/05/19  1144 07/30/19  0459   SODIUM mmol/L 139 143   POTASSIUM mmol/L 3.8 3.6   CHLORIDE mmol/L 98 107   CO2 mmol/L 25.3 24.0   BUN mg/dL 33* 29*   CREATININE mg/dL 1.19* 1.04*   CALCIUM mg/dL 8.9 8.1*   GLUCOSE mg/dL 178* 84     Results from last 7 days   Lab Units 08/05/19  1144 07/30/19  0459   MAGNESIUM mg/dL 2.0 1.9     No results found for: HGBA1C  Results from last 7 days   Lab Units 08/05/19  1144 07/30/19  0459   BILIRUBIN mg/dL 0.3 <0.2*   ALK PHOS U/L 134* 107   AST (SGOT) U/L 17 10   ALT (SGPT) U/L 14 7     Results from last 7 days   Lab Units 08/05/19  1144 07/30/19  2116   TROPONIN T ng/mL <0.010 <0.010                   I have reviewed the patient's laboratory results.    Imaging:  Imaging Results (last 72 hours)     Procedure Component Value Units Date/Time    CT Abdomen Pelvis Without Contrast [[id number]] Collected:  08/05/19 1747     Updated:  08/05/19 1822    Narrative:          CT ABDOMEN PELVIS WO CONTRAST-      TECHNIQUE: Multiple axial CT images were obtained  from lung bases  through pubic symphysis without administration of IV contrast.  Reformatted images in the coronal and/or sagittal plane(s) were  generated from the axial data set to facilitate diagnostic accuracy  and/or surgical planning.  Oral Contrast:NONE.     Radiation dose reduction techniques were utilized per ALARA protocol.  Automated exposure control was initiated through either or [employer] or  DoseRight software packages by  protocol.             Clinical information  Sepsis      Comparison  NONE.     Findings  LOWER THORAX: Clear. No effusions.     ABDOMEN:        LIVER: Homogeneous. No focal hepatic mass or ductal dilatation.        GALLBLADDER: No dilation or stone identified.        PANCREAS: Unremarkable. No mass or ductal dilatation.        SPLEEN: Homogeneous. No splenomegaly.        ADRENALS: No mass.        KIDNEYS: No mass. No obstructive uropathy.  No evidence of  urolithiasis.        GI TRACT: NONSPECIFIC LEFT UPPER QUADRANT SMALL BOWEL WALL THICKENING  THAT COULD REPRESENT ENTERITIS OR OTHER FOCAL INFLAMMATORY PROCESS.        PERITONEUM: No free air. No free fluid or loculated fluid  collections.        MESENTERY: Unremarkable.        LYMPH NODES: No lymphadenopathy.        VASCULATURE: No evidence of aneurysm.        ABDOMINAL WALL: No focal hernia or mass.           OTHER: None.     PELVIS:        BLADDER: No focal mass or significant wall thickening        REPRODUCTIVE: Unremarkable as visualized.        APPENDIX: Nondistended. No surrounding inflammation.     BONES: No acute bony abnormality.       Impression:       Impression:  Nonspecific left upper quadrant small bowel wall thickening that could  represent enteritis or other focal inflammatory process.     This report was finalized on 8/5/2019 6:19 PM by Dr. Harrison Biswas MD.       CT Chest Without Contrast [[id number]] Collected:  08/05/19 1746     Updated:  08/05/19 1820    Narrative:       CT CHEST WO CONTRAST-       TECHNIQUE: Multiple axial CT images were obtained from lung apex through  upper abdomen without administration of IV contrast. Reformatted images  in the coronal and/or sagittal plane(s) were generated from the axial  data set to facilitate diagnostic accuracy and/or surgical planning.  Oral Contrast:NONE.     Radiation dose reduction techniques were utilized per ALARA protocol.  Automated exposure control was initiated through either or [employer] or  DoseRight software packages by  protocol.             Clinical information  Sepsis      Comparison  NONE.     Findings  LUNGS: Unremarkable. No parenchymal soft tissue nodules.  No focal air  space disease.     HEART: Unremarkable.     PERICARDIUM: No effusion.     MEDIASTINUM: No masses. No enlarged lymph nodes.  No fluid collections.     PLEURA: No pleural effusion. No pleural mass or abnormal calcification.     MAJOR AIRWAYS: Clear. No intrinsic mass.     VASCULATURE: No evidence of aneurysm.     VISUALIZED UPPER ABDOMEN:        LIVER: Homogeneous. No focal hepatic mass or ductal dilatation.        SPLEEN: Homogeneous. No splenomegaly.        ADRENALS: No mass.        KIDNEYS: No mass. No obstructive uropathy.  No evidence of  urolithiasis.        GI TRACT: Non-dilated. No definite wall thickening.        PERITONEUM: No free air. No free fluid or loculated fluid  collections.           ABDOMINAL WALL: No focal hernia or mass.           OTHER: None.     BONES: No acute bony abnormality.       Impression:       Impression:  1. Unremarkable exam.  No source for the patient symptoms.  2. Other incidental/non-acute findings as above.     This report was finalized on 8/5/2019 5:46 PM by Dr. Harrison Biswas MD.       XR Chest 1 View [[id number]] Collected:  08/05/19 1241     Updated:  08/05/19 1243    Narrative:       EXAMINATION: XR CHEST 1 VW-      CLINICAL INDICATION:     hypothermia     TECHNIQUE:  XR CHEST 1 VW-      COMPARISON: NONE      FINDINGS:   The  lungs remain aerated.  Heart and mediastinum contours are unremarkable.  No pleural effusion.  No pneumothorax.   Bony and soft tissue structures are unremarkable.       Impression:       No radiographic evidence of acute cardiac or pulmonary  disease.     This report was finalized on 8/5/2019 12:41 PM by Dr. Harrison Biswas MD.           I have personally reviewed the patient's radiologic imaging.          EKG: Sinus bradycardia, HR 53, QTc 476. No overt ST changes to suggest acute ischemia appreciated.     I have personally reviewed the patient's EKG.          Assessment/Plan     - Hypothermia: suspect could have been precipitated by hypoglycemia though cannot confirm this as patient could not find glucometer at home to check glucose and ate jello before coming to the ED. Underlying infectious process on differential, most likely source felt to be intra-abdominal based on exam findings (in ED) and CT findings. Monitor accuchecks q2h. On empiric IV antibiotics including rocephin and flagyl, continue to monitor CRP and follow up on blood cultures. Random cortisol 17. Repeat at 8 am, if low will perform cosyntropin test. Per patient account, she may have been on fludricortisone in the past which raises suspicion for underlying adrenal insufficiency. Patient does have mildly elevated TSH though higher on 7/24/19 and free T4 normal at that time. Repeat free T4 and T3 pending.   - SIRS criteria with hypothermia and CRP elevation: again, suspect possible intra-abdominal process, see plan above.  - Type II DM, insulin dependent: A1c 9.2% as of 7/2019. Monitor accuchecks q2h to investigate for hypoglycemic episodes as noted above. Hold long-acting insulin for now, but make low dose SSI available as needed.  - Mild acute on stage III CKD: gently hydrate with IV fluids. Avoid nephrotoxic meds (lasix, lisinopril). Renally dose gabapentin. Repeat labs in the AM.  - Newly diagnosed seizure activity, non-convulsive: continue  keppra and depakote. Keppra level pending (send-out), depakote level normal.   - PAD: continue home plavix  - Chronic right lower extremity osteomyelitis: on rocephin for now which should suffice (placed on keflex at time of discharge from [facility] earlier this month).   - Hypertension: continue home hydralazine with hold parameters for now. Hold metoprolol due to borderline bradycardia, and hold lisinopril and lasix for reasons explained above.     DVT Prophylaxis: SQ heparin    Estimated Length of Stay >2 midnights    I discussed the patient's findings, assessment and plan with the patient and her RN Claudia who was present during my interview and exam on the PCU.    * patient is high risk due to hypothermia, possible sepsis, mild acute on CKD, PAD, newly diagnosed non-convulsive seizures, chronic osteomyelitis    Erwin Tatum MD  08/06/19  2:38 AM      Electronically signed by Erwin Tatum MD at 8/6/2019  3:28 AM          Emergency Department Notes      Anu Bennett PA at 8/5/2019 11:15 AM          Subjective   59-year-old female presents to the ED today for hypothermia.  She states she slept really well last night and felt her normal self when she woke up this morning she felt extremely cold.  She states her temperature was 90.2 at home.  She took this orally.  She states she thought she might have been hypoglycemic so she ate but that did not improve her temperature.  Her family called the ambulance who brought her here.  The patient denies any cough.  She denies any chest pain or shortness of breath.  She denies any nausea, vomiting or diarrhea.  She denies any abdominal pain.  She denies any urinary symptoms.  She states she has had 2 episodes of hypothermia in the past.  She states it happened once when she broke her leg and it happened more recently while she was admitted to [facility].  She states she was there 2 weeks ago because they thought she was having a stroke and then she  had a seizure.  She states she had a complete neurologic work-up that came back negative.  They started her on Depakote and Keppra for seizures.  She states they were unable to find out why she had the episode of hypothermia.        History provided by:  Patient  Cold Exposure   Severity:  Severe  Onset quality:  Sudden  Duration: started this morning.  Timing:  Constant  Progression:  Improving  Chronicity:  Recurrent  Associated symptoms: no abdominal pain, no chest pain, no congestion, no cough, no diarrhea, no ear pain, no fatigue, no fever, no headaches, no loss of consciousness, no myalgias, no nausea, no rash, no rhinorrhea, no shortness of breath, no sore throat, no vomiting and no wheezing        Review of Systems   Constitutional: Positive for chills. Negative for appetite change, fatigue and fever.   HENT: Negative for congestion, ear pain, rhinorrhea and sore throat.    Eyes: Negative.    Respiratory: Negative for cough, shortness of breath and wheezing.    Cardiovascular: Negative for chest pain.   Gastrointestinal: Negative for abdominal pain, diarrhea, nausea and vomiting.   Genitourinary: Negative.    Musculoskeletal: Negative for myalgias.   Skin: Negative for rash.   Neurological: Negative for loss of consciousness and headaches.   Psychiatric/Behavioral: Negative.    All other systems reviewed and are negative.      Past Medical History:   Diagnosis Date   • Arthritis    • Closed fracture of right fibula and tibia 12/25/2018   • Depression    • Diabetic ulcer of left foot associated with type 2 diabetes mellitus (CMS/[facility]) 6/23/2017   • Diastolic dysfunction    • Essential hypertension    • Hyperlipidemia    • Iron deficiency anemia 12/30/2018   • PAD (peripheral artery disease) (CMS/[facility])    • Type 2 diabetes mellitus with hyperglycemia, with long-term current use of insulin (CMS/[facility]) 12/30/2018       Allergies   Allergen Reactions   • Penicillins Other (See Comments)     Patient has received  cefazolin, ceftriaxone and cefepime during past admissions.   • Codeine Rash     Pt tolerates Norco @ home   • Sulfa Antibiotics Rash       Past Surgical History:   Procedure Laterality Date   • BACK SURGERY      Post spinal diskectomy, osteophytectomy in one lumbar interspace   • CATARACT EXTRACTION      Left    •  SECTION     • DENTAL PROCEDURE     • HYSTERECTOMY     • INCISION AND DRAINAGE LEG Left 4/15/2017    Procedure: INCISION AND DRAINAGE LOWER EXTREMITY;  Surgeon: Basilio Morris MD;  Location: [facility] OR;  Service:    • INCISION AND DRAINAGE LEG Left 2017    Procedure: Irrigation and Debridment abcess diabetic wound left foot with deep culture;  Surgeon: Basilio Morris MD;  Location: [facility] OR;  Service:    • KNEE ARTHROSCOPY Right    • ORIF TIBIA FRACTURE Right 2018    Procedure: TIBIAL  FRACTURE OPEN REDUCTION INTERNAL FIXATION;  Surgeon: Jung Barragan MD;  Location: [facility] OR;  Service: Orthopedics   • TOE AMPUTATION Right     5th   • TUBAL ABDOMINAL LIGATION         Family History   Problem Relation Age of Onset   • Heart disease Mother    • Cancer Mother    • COPD Mother    • Diabetes Other    • Depression Other        Social History     Socioeconomic History   • Marital status:      Spouse name: Not on file   • Number of children: Not on file   • Years of education: Not on file   • Highest education level: Not on file   Tobacco Use   • Smoking status: Never Smoker   • Smokeless tobacco: Never Used   Substance and Sexual Activity   • Alcohol use: No   • Drug use: No   • Sexual activity: Defer           Objective   Physical Exam   Constitutional: She is oriented to person, place, and time. She appears well-developed and well-nourished. No distress.   HENT:   Head: Normocephalic and atraumatic.   Right Ear: External ear normal.   Left Ear: External ear normal.   Nose: Nose normal.   Mouth/Throat: Oropharynx is clear and moist.   Eyes: Conjunctivae and EOM are normal. Pupils  are equal, round, and reactive to light.   Neck: Normal range of motion. Neck supple.   Cardiovascular: Regular rhythm, normal heart sounds and intact distal pulses. Bradycardia present.   Pulmonary/Chest: Effort normal and breath sounds normal. No respiratory distress. She has no wheezes. She exhibits no tenderness.   Abdominal: Soft. Bowel sounds are normal. There is no tenderness. There is no rebound and no guarding.   Musculoskeletal: Normal range of motion.   Neurological: She is alert and oriented to person, place, and time.   Skin: Skin is dry. Capillary refill takes less than 2 seconds. There is pallor.   Psychiatric: She has a normal mood and affect. Her behavior is normal. Judgment and thought content normal.   Nursing note and vitals reviewed.      Procedures          ED Course  ED Course as of Aug 05 1934   Mon Aug 05, 2019   1119 Temp is 92.2 rectal, will apply warming blanket  [AH]   1136 Dr. Tucker notified of level 2 patient  [AH]   1221 11:52 - sinus bradycardia, rate of 53, no acute ischemia, reviewed by Dr. Tucker ECG 12 Lead [AH]   1416 Dr. Tucker has seen and evaluated the patient, recommended CT of chest and abd/pelvis.  [AH]   1527 Patient has been sleeping, no acute distress.  Awaiting CT scans.  [AH]   1611 Patient is going to CT at this time.  [AH]   1729 I have notified CT tech that I do not have a reading yet on her CT scans.  They will contact the radiologist.  [AH]   1755 Patient awake and alert, no distress.  Awaiting CT readings, there are currently issues with transmitting images.  Patient is aware of the delay.  She states she is hungry and ready to eat, tray has been ordered.  [AH]   1847 No acute findings on CT of chest.  CT abd/pelvis shows nonspecific bowel wall thickening in LUQ.  Will discuss with hospitalist service about admission.  [AH]   1933 I have discussed with Dr. Tatum who will admit.  [AH]      ED Course User Index  [AH] Anu Bennett, PA                  [facility]  Number  of Diagnoses or Management Options  Enteritis:   Hypothermia, initial encounter:      Amount and/or Complexity of Data Reviewed  Clinical lab tests: reviewed  Tests in the radiology section of CPT®:  reviewed  Tests in the medicine section of CPT®:  reviewed  Decide to obtain previous medical records or to obtain history from someone other than the patient: yes  Discuss the patient with other providers: yes    Patient Progress  Patient progress: improved        Final diagnoses:   Hypothermia, initial encounter   Enteritis            Anu Bennett PA  08/05/19 1934      Electronically signed by Anu Bennett PA at 8/5/2019  7:34 PM     Yandel Chin, RN at 8/5/2019 11:20 AM        Warming blanket applied.     Yandel Chin, RN  08/05/19 1128      Electronically signed by Yandel Chin, RN at 8/5/2019 11:28 AM     Frannie Cardenas at 8/5/2019  1:33 PM        Pt glucose was 144 and provider made aware.     Frannie Cardenas  08/05/19 1333      Electronically signed by Frannie Cardenas at 8/5/2019  1:33 PM     Elif Dixon at 8/5/2019  6:42 PM        Called an ordered food tray.     Elif Dixon  08/05/19 1843      Electronically signed by Elif Dixon at 8/5/2019  6:43 PM     Shania Bey, RN at 8/5/2019 10:00 PM        Report received from Shania Monsalve RN, GLORIA  08/05/19 4866      Electronically signed by Shania Bey, RN at 8/5/2019 10:18 PM     Scheduled Meds Sorted by Name   for Kassy Reny S as of 8/4/19 through 8/6/19     1 Day 3 Days 7 Days 10 Days < Today >    Legend:                           Inactive     Active     Other Encounter    Linked               Medications 08/04/19 08/05/19 08/06/19   atorvastatin (LIPITOR) tablet 40 mg   Dose: 40 mg  Freq: Daily Route: PO  Start: 08/06/19 0900    Admin Instructions:   Avoid grapefruit juice.      0925            busPIRone (BUSPAR) tablet 10 mg   Dose: 10 mg  Freq: Every 8 Hours Route: PO  Start: 08/06/19 0900    Admin  Instructions:   Take with food. Avoid grapefruit juice.      0925   1700          cefTRIAXone (ROCEPHIN) 1 g/100 mL 0.9% NS (MBP)   Dose: 1 g  Freq: Every 24 Hours Route: IV  Indications of Use: INTRA-ABDOMINAL INFECTION  Start: 08/06/19 1400 End: 08/11/19 1359    Admin Instructions:   Caution: Look alike/sound alike drug alert. Activate vial before using.      1400            cefTRIAXone (ROCEPHIN) 2 g/100 mL 0.9% NS VTB (AVELINA)   Dose: 2 g  Freq: Once Route: IV  Indications of Use: SEPSIS  Last Dose: Stopped (08/05/19 1505)  Start: 08/05/19 1430 End: 08/05/19 1505    Admin Instructions:   Caution: Look alike/sound alike drug alert. Activate vial before using.     1435   1505           cephalexin (KEFLEX) capsule 500 mg   Dose: 500 mg  Freq: Every 24 Hours Route: PO  Indications of Use: SKIN AND SOFT TISSUE INFECTION  Start: 08/06/19 0845 End: 08/06/19 0806    Admin Instructions:   Take with food if GI upset occurs.      0806-D/C'd          clopidogrel (PLAVIX) tablet 75 mg   Dose: 75 mg  Freq: Daily Route: PO  Start: 08/06/19 0900 0926            clopidogrel (PLAVIX) tablet 75 mg   Dose: 75 mg  Freq: Daily Route: PO  Start: 08/06/19 0900 End: 08/06/19 0802      0802-D/C'd          divalproex (DEPAKOTE) DR tablet 1,000 mg   Dose: 1,000 mg  Freq: Every 12 Hours Scheduled Route: PO  Start: 08/06/19 0900    Admin Instructions:   Swallow tablets whole. Do not crush, split or chew.      0926 2100          divalproex (DEPAKOTE) DR tablet 1,000 mg   Dose: 1,000 mg  Freq: Every 12 Hours Scheduled Route: PO  Start: 08/06/19 0900 End: 08/06/19 0802    Admin Instructions:   Swallow tablets whole. Do not crush, split or chew.      0802-D/C'd          FLUoxetine (PROzac) capsule 20 mg   Dose: 20 mg  Freq: Daily Route: PO  Start: 08/06/19 0900    Admin Instructions:   Caution: Look alike/sound alike drug alert      0926            gabapentin (NEURONTIN) capsule 200 mg   Dose: 200 mg  Freq: Every 12 Hours Scheduled Route:  PO  Start: 08/06/19 0900    Admin Instructions:         0925 2100          gabapentin (NEURONTIN) capsule 700 mg   Dose: 700 mg  Freq: Every 12 Hours Scheduled Route: PO  Start: 08/06/19 0900 End: 08/06/19 0802    Admin Instructions:         0802-D/C'd          heparin (porcine) 5000 UNIT/ML injection 5,000 Units   Dose: 5,000 Units  Freq: Every 12 Hours Scheduled Route: SC  Indications Comment: Prophylaxis of Venous Thromboembolism  Start: 08/06/19 0000      0119   0926   2100        hydrALAZINE (APRESOLINE) tablet 25 mg   Dose: 25 mg  Freq: Every 8 Hours Scheduled Route: PO  Start: 08/06/19 1400 End: 08/06/19 0802    Admin Instructions:   Caution: Look alike/sound alike drug alert      0802-D/C'd          hydrALAZINE (APRESOLINE) tablet 25 mg   Dose: 25 mg  Freq: Every 8 Hours Scheduled Route: PO  Start: 08/06/19 0600    Admin Instructions:   Hold for SBP<120  Caution: Look alike/sound alike drug alert      0521   1400   2200        insulin aspart (novoLOG) injection 0-7 Units   Dose: 0-7 Units  Freq: 4 Times Daily Before Meals & Nightly Route: SC  Start: 08/06/19 0730    Admin Instructions:   Correction - Low Dose.  Less than 40 units/day total insulin dose or lean, elderly, renal patients    Blood glucose 150-199 mg/dL - 2 units  Blood glucose 200-249 mg/dL - 3 units  Blood glucose 250-299 mg/dL - 4 units  Blood glucose 300-349 mg/dL - 5 units  Blood glucose 350-400 mg/dL - 6 units  Blood glucose greater than 400 mg/dL - 7 units and call provider        [phone]     2100            levETIRAcetam (KEPPRA) tablet 750 mg   Dose: 750 mg  Freq: Every 12 Hours Scheduled Route: PO  Start: 08/06/19 0900 End: 08/06/19 0802    Admin Instructions:   Swallow whole; do not crush, chew, or split tablet.      0802-D/C'd          levETIRAcetam (KEPPRA) tablet 750 mg   Dose: 750 mg  Freq: Every 12 Hours Scheduled Route: PO  Start: 08/06/19 0900    Admin Instructions:   Swallow whole; do not crush, chew, or split  tablet.      0928 2100          lisinopril (PRINIVIL,ZESTRIL) tablet 40 mg   Dose: 40 mg  Freq: Every 24 Hours Scheduled Route: PO  Start: 08/06/19 0900 0926            metoprolol succinate XL (TOPROL-XL) 24 hr tablet 50 mg   Dose: 50 mg  Freq: Daily Route: PO  Start: 08/06/19 0900    Admin Instructions:   Do not crush or chew.      0926            metroNIDAZOLE (FLAGYL) 500 mg/100mL IVPB   Dose: 500 mg  Freq: Every 8 Hours Route: IV  Indications of Use: INTRA-ABDOMINAL INFECTION  Start: 08/06/19 0330 End: 08/11/19 0329    Admin Instructions:   Do not refrigerate.      0322 1130 1930        metroNIDAZOLE (FLAGYL) 500 mg/100mL IVPB   Dose: 500 mg  Freq: Once Route: IV  Indications of Use: SEPSIS  Last Dose: Stopped (08/05/19 2037)  Start: 08/05/19 1931 End: 08/05/19 2037    Admin Instructions:   Do not refrigerate.     1937 2037           multivitamin (DAILY FRAN) tablet 1 tablet   Dose: 1 tablet  Freq: Daily Route: PO  Start: 08/06/19 0900 0926            pantoprazole (PROTONIX) EC tablet 40 mg   Dose: 40 mg  Freq: Every Early Morning Route: PO  Start: 08/06/19 0900    Admin Instructions:   Swallow whole; do not crush, split, or chew.      0926            rOPINIRole (REQUIP) tablet 0.5 mg   Dose: 0.5 mg  Freq: 2 Times Daily Route: PO  Start: 08/06/19 0900 0926 2100          sodium chloride 0.9 % bolus 1,000 mL   Dose: 1,000 mL  Freq: Once Route: IV  Last Dose: Stopped (08/05/19 1602)  Start: 08/05/19 1525 End: 08/05/19 1602     1532   1602           sodium chloride 0.9 % bolus 500 mL   Dose: 500 mL  Freq: Once Route: IV  Last Dose: Stopped (08/05/19 1326)  Start: 08/05/19 1231 End: 08/05/19 1326     1256   1326           sodium chloride 0.9 % flush 3 mL   Dose: 3 mL  Freq: Every 12 Hours Scheduled Route: IV  Start: 08/06/19 0000      (0120)   0946   2100        Medications 08/04/19 08/05/19 08/06/19       Continuous Meds Sorted by Name   for Reny Elena as of 8/4/19 through 8/6/19     Legend:                           Inactive     Active     Other Encounter    Linked               Medications 08/04/19 08/05/19 08/06/19   sodium chloride 0.9 % infusion   Rate: 75 mL/hr Dose: 75 mL/hr  Freq: Continuous Route: IV  Last Dose: 75 mL/hr (08/06/19 0323)  Start: 08/05/19 1135     1202          0119   0323              PRN Meds Sorted by Name   for Reny Elena as of 8/4/19 through 8/6/19    Legend:         Inactive     Active     Other Encounter    Linked               Medications 08/04/19 08/05/19 08/06/19   CloNIDine (CATAPRES) tablet 0.1 mg   Dose: 0.1 mg  Freq: Every 6 Hours PRN Route: PO  PRN Reason: High Blood Pressure  PRN Comment: greater than 160/90  Start: 08/06/19 0800    Admin Instructions:   Caution: Look alike/sound alike drug alert. Please read the label.  Caution: Look alike/sound alike drug alert.         dextrose (D50W) 25 g/ 50mL Intravenous Solution 25 g   Dose: 25 g  Freq: Every 15 Minutes PRN Route: IV  PRN Reason: Low Blood Sugar  PRN Comment: Blood Sugar Less Than 70  Start: 08/06/19 0320    Admin Instructions:   Blood sugar less than 70; patient has IV access - Unresponsive, NPO or Unable To Safely Swallow         dextrose (GLUTOSE) oral gel 15 g   Dose: 15 g  Freq: Every 15 Minutes PRN Route: PO  PRN Reason: Low Blood Sugar  PRN Comment: Blood sugar less than 70  Start: 08/06/19 0320    Admin Instructions:   BS<70, Patient Alert, Is not NPO, Can safely swallow.         glucagon (human recombinant) (GLUCAGEN DIAGNOSTIC) injection 1 mg   Dose: 1 mg  Freq: As Needed Route: SC  PRN Reason: Low Blood Sugar  PRN Comment: Blood Glucose Less Than 70  Start: 08/06/19 0320    Admin Instructions:   Blood Glucose Less Than 70 - Patient Without IV Access - Unresponsive, NPO or Unable To Safely Swallow         HYDROcodone-acetaminophen (NORCO)  MG per tablet 1 tablet   Dose: 1 tablet  Freq: Every 4 Hours PRN Route: PO  PRN Reason: Mild Pain   Start: 08/06/19 0800    Admin  Instructions:   [JOHNSON]    Do not exceed 4 grams of acetaminophen in a 24 hr period.    If given for pain, use the following pain scale:   Mild Pain = Pain Score of 1-3, CPOT 1-2  Moderate Pain = Pain Score of 4-6, CPOT 3-4  Severe Pain = Pain Score of 7-10, CPOT 5-8         HYDROcodone-acetaminophen (NORCO) 5-325 MG per tablet 1 tablet   Dose: 1 tablet  Freq: Every 6 Hours PRN Route: PO  PRN Reason: Severe Pain   PRN Comment: hold for oversedation, SBP<100  Start: 08/06/19 0318 End: 08/16/19 0317    Admin Instructions:   [JOHNSON]    Do not exceed 4 grams of acetaminophen in a 24 hr period.    If given for pain, use the following pain scale:   Mild Pain = Pain Score of 1-3, CPOT 1-2  Moderate Pain = Pain Score of 4-6, CPOT 3-4  Severe Pain = Pain Score of 7-10, CPOT 5-8      0521            sodium chloride 0.9 % flush 10 mL   Dose: 10 mL  Freq: As Needed Route: IV  PRN Reason: Line Care  Start: 08/05/19 1132         sodium chloride 0.9 % flush 3-10 mL   Dose: 3-10 mL  Freq: As Needed Route: IV  PRN Reason: Line Care  Start: 08/05/19 2304      0119            Medications 08/04/19 08/05/19 08/06/19

## 2019-08-06 NOTE — PROGRESS NOTES
Malnutrition Severity Assessment    Patient Name:  Reny Elena  YOB: 1960  MRN: [id number]  Admit Date:  8/5/2019    Patient meets criteria for : Severe Malnutrition    Comments:  If Md agrees with findings please cosign.     Malnutrition Severity Assessment  Malnutrition Type: Chronic Disease - Related Malnutrition     Malnutrition Type (last 8 hours)      Malnutrition Severity Assessment     Row Name 08/06/19 1413       Malnutrition Severity Assessment    Malnutrition Type  Chronic Disease - Related Malnutrition    Row Name 08/06/19 1413       Unintentional Weight Loss     Unintentional Weight Loss   Weight loss greater than 10% in six months    Row Name 08/06/19 1413       Muscle Loss    Synagogue Region  Moderate - slight depression    Clavicle Bone Region  Severe - protruding prominent bone    Acromion Bone Region  Severe - squared shoulders, bones, and acromion process protrusion prominent    Scapular Bone Region  Moderate - mild depression, bones may show slightly    Dorsal Hand Region  Moderate - slight depression    Patellar Region  Severe - prominent bone, square looking, very little muscle definition    Anterior Thigh Region  Moderate - mild depression on inner thigh    Posterior Calf Region  Severe - thin with very little definition/firmness    Row Name 08/06/19 1413       Fat Loss    Orbital Region   Severe - pronounced hollowness/depression, dark circles, loose saggy skin    Upper Arm Region  Severe - mostly skin, very little space between folds, fingers touch    Thoracic & Lumbar Region  Moderate - ribs visible with mild depressions, iliac crest somewhat prominent    Row Name 08/06/19 1413       Criteria Met (Must meet criteria for severity in at least 2 of these categories: M Wasting, Fat Loss, Fluid, Secondary Signs, Wt. Status, Intake)    Patient meets criteria for   Severe Malnutrition          Electronically signed by:  Blessing Ch RD  08/06/19 2:27 PM

## 2019-08-06 NOTE — PROGRESS NOTES
[facility] HOSPITALIST PROGRESS NOTE     Patient Identification:  Name:  Reny Elena  Age:  59 y.o.  Sex:  female  :  1960  MRN:  [id number]  Visit Number:  [id number]  ROOM: [address]     Primary Care Provider:  Yandel Paulson MD    Length of stay:  1    Subjective        Chief Compliant follow-up for hypothermia    Patient seen and examined this morning with no family at bedside.  States that she is feeling better.  Denies any chest pain, shortness of breath, nausea vomiting abdominal pain diarrhea.  No further episodes of hypothermia.  Noted to have elevated blood pressure.  On room air.  Admitted this morning.      Objective     Current Hospital Meds:  busPIRone 10 mg Oral Q8H   carvedilol 12.5 mg Oral BID With Meals   ceftriaxone 1 g Intravenous Q24H   clopidogrel 75 mg Oral Daily   divalproex 500 mg Oral Q12H   [START ON 2019] escitalopram 10 mg Oral Daily   gabapentin 200 mg Oral Q12H   heparin (porcine) 5,000 Units Subcutaneous Q12H   hydrALAZINE 25 mg Oral Q8H   insulin aspart 0-7 Units Subcutaneous 4x Daily AC & at Bedtime   lactobacillus acidophilus 1 capsule Oral Daily   levETIRAcetam 750 mg Oral Q12H   lisinopril 40 mg Oral Q24H   metroNIDAZOLE 500 mg Intravenous Q8H   pantoprazole 40 mg Oral Q AM   [START ON 2019] pravastatin 20 mg Oral Nightly   rOPINIRole 0.5 mg Oral BID   sodium chloride 3 mL Intravenous Q12H     sodium chloride 50 mL/hr Last Rate: 50 mL/hr (19 1231)     ----------------------------------------------------------------------------------------------------------------------  Vital Signs:  Temp:  [97.5 °F (36.4 °C)-98.2 °F (36.8 °C)] 97.8 °F (36.6 °C)  Heart Rate:  [55-74] 67  Resp:  [12-20] 13  BP: ()/(49-96) 162/76  SpO2:  [94 %-100 %] 98 %  on   ;   Device (Oxygen Therapy): room air  Body mass index is 20.84 kg/m².    Wt Readings from Last 3 Encounters:   19 56.8 kg (125 lb 3.5 oz)   19 52 kg (114 lb 11.2 oz)   19  59 kg (130 lb)       Intake/Output Summary (Last 24 hours) at 8/6/2019 1756  Last data filed at 8/6/2019 1341  Gross per 24 hour   Intake 773 ml   Output 1025 ml   Net -252 ml     Diet Regular; Cardiac, Consistent Carbohydrate, Renal, Low Sodium  ----------------------------------------------------------------------------------------------------------------------  Physical exam:  General: Comfortable, Not in distress.  Well-developed and well-nourished.   HENT:  Head:  Normocephalic and atraumatic.  Mouth:  Moist mucous membranes.    Eyes:  Conjunctivae and EOM are normal.  Pupils are equal, round, and reactive to light.  No scleral icterus.    Neck:  Neck supple.  No JVD present.  trachea midline.  Cardiovascular:  Normal rate, regular rhythm with no murmur.  Pulmonary/Chest:  No respiratory distress, no wheezes, no crackles, with normal breath sounds and good air movement.  Abdomen:  Soft.  Bowel sounds are normal.  No distension and no tenderness. No organomegaly.   Musculoskeletal:  No edema, no tenderness, and no deformity.  No red or swollen joints anywhere.    Neurological:  Alert and oriented to person, place, and time.  No cranial nerve deficit. No focal deficits. No facial droop.  No slurred speech.   Skin:  Skin is warm and dry. No rash noted. No pallor.   Peripheral vascular: pulses in all 4 extremities with no clubbing, no cyanosis, no edema.  Genitourinary: no tate  ----------------------------------------------------------------------------------------------------------------------  ----------------------------------------------------------------------------------------------------------------------Results from last 7 days   Lab Units 08/06/19  0235 08/05/19  1144   CRP mg/dL 1.66* 2.94*   LACTATE mmol/L  --  1.5   WBC 10*3/mm3 4.98 5.51   HEMOGLOBIN g/dL 10.8* 13.2   HEMATOCRIT % 35.2 41.1   MCV fL 94.4 90.3   MCHC g/dL 30.7* 32.1   PLATELETS 10*3/mm3 122* 160     Results from last 7 days   Lab  Units 08/06/19  0235 08/05/19  1144   SODIUM mmol/L 142 139   POTASSIUM mmol/L 3.7 3.8   MAGNESIUM mg/dL  --  2.0   CHLORIDE mmol/L 106 98   CO2 mmol/L 21.9* 25.3   BUN mg/dL 33* 33*   CREATININE mg/dL 1.23* 1.19*   EGFR IF NONAFRICN AM mL/min/1.73 44* 46*   CALCIUM mg/dL 7.9* 8.9   GLUCOSE mg/dL 203* 178*   ALBUMIN g/dL 2.46* 3.34*   BILIRUBIN mg/dL <0.2* 0.3   ALK PHOS U/L 104 134*   AST (SGOT) U/L 21 17   ALT (SGPT) U/L 11 14   Estimated Creatinine Clearance: 43.1 mL/min (A) (by C-G formula based on SCr of 1.23 mg/dL (H)).  Results from last 7 days   Lab Units 08/06/19  0235 08/05/19  1144 07/30/19  2116   TROPONIN T ng/mL <0.010 <0.010 <0.010     Glucose   Date/Time Value Ref Range Status   08/06/2019 1607 319 (H) 70 - 130 mg/dL Final   08/06/2019 1231 230 (H) 70 - 130 mg/dL Final   08/06/2019 0923 166 (H) 70 - 130 mg/dL Final   08/06/2019 0524 159 (H) 70 - 130 mg/dL Final   08/06/2019 0321 171 (H) 70 - 130 mg/dL Final   08/05/2019 1744 81 70 - 130 mg/dL Final   08/05/2019 1528 113 70 - 130 mg/dL Final   08/05/2019 1332 144 (H) 70 - 130 mg/dL Final     No results found for: AMMONIA    Results from last 7 days   Lab Units 08/05/19  1250   NITRITE UA  Negative   WBC UA /HPF 3-5*   BACTERIA UA /HPF 2+*   SQUAM EPITHEL UA /HPF 0-2     Blood Culture   Date Value Ref Range Status   08/05/2019 No growth at 24 hours  Preliminary   08/05/2019 No growth at 24 hours  Preliminary          I have personally looked at the labs and they are summarized above.  ----------------------------------------------------------------------------------------------------------------------  Imaging Results (last 24 hours)     Procedure Component Value Units Date/Time    CT Abdomen Pelvis Without Contrast [[id number]] Collected:  08/05/19 1747     Updated:  08/05/19 1822    Narrative:          CT ABDOMEN PELVIS WO CONTRAST-      TECHNIQUE: Multiple axial CT images were obtained from lung bases  through pubic symphysis without administration  of IV contrast.  Reformatted images in the coronal and/or sagittal plane(s) were  generated from the axial data set to facilitate diagnostic accuracy  and/or surgical planning.  Oral Contrast:NONE.     Radiation dose reduction techniques were utilized per ALARA protocol.  Automated exposure control was initiated through either or [employer] or  DoseRight software packages by  protocol.             Clinical information  Sepsis      Comparison  NONE.     Findings  LOWER THORAX: Clear. No effusions.     ABDOMEN:        LIVER: Homogeneous. No focal hepatic mass or ductal dilatation.        GALLBLADDER: No dilation or stone identified.        PANCREAS: Unremarkable. No mass or ductal dilatation.        SPLEEN: Homogeneous. No splenomegaly.        ADRENALS: No mass.        KIDNEYS: No mass. No obstructive uropathy.  No evidence of  urolithiasis.        GI TRACT: NONSPECIFIC LEFT UPPER QUADRANT SMALL BOWEL WALL THICKENING  THAT COULD REPRESENT ENTERITIS OR OTHER FOCAL INFLAMMATORY PROCESS.        PERITONEUM: No free air. No free fluid or loculated fluid  collections.        MESENTERY: Unremarkable.        LYMPH NODES: No lymphadenopathy.        VASCULATURE: No evidence of aneurysm.        ABDOMINAL WALL: No focal hernia or mass.           OTHER: None.     PELVIS:        BLADDER: No focal mass or significant wall thickening        REPRODUCTIVE: Unremarkable as visualized.        APPENDIX: Nondistended. No surrounding inflammation.     BONES: No acute bony abnormality.       Impression:       Impression:  Nonspecific left upper quadrant small bowel wall thickening that could  represent enteritis or other focal inflammatory process.     This report was finalized on 8/5/2019 6:19 PM by Dr. Harrison Biswas MD.       CT Chest Without Contrast [[id number]] Collected:  08/05/19 1746     Updated:  08/05/19 1820    Narrative:       CT CHEST WO CONTRAST-      TECHNIQUE: Multiple axial CT images were obtained from lung apex  through  upper abdomen without administration of IV contrast. Reformatted images  in the coronal and/or sagittal plane(s) were generated from the axial  data set to facilitate diagnostic accuracy and/or surgical planning.  Oral Contrast:NONE.     Radiation dose reduction techniques were utilized per ALARA protocol.  Automated exposure control was initiated through either or [employer] or  DoseRight software packages by  protocol.             Clinical information  Sepsis      Comparison  NONE.     Findings  LUNGS: Unremarkable. No parenchymal soft tissue nodules.  No focal air  space disease.     HEART: Unremarkable.     PERICARDIUM: No effusion.     MEDIASTINUM: No masses. No enlarged lymph nodes.  No fluid collections.     PLEURA: No pleural effusion. No pleural mass or abnormal calcification.     MAJOR AIRWAYS: Clear. No intrinsic mass.     VASCULATURE: No evidence of aneurysm.     VISUALIZED UPPER ABDOMEN:        LIVER: Homogeneous. No focal hepatic mass or ductal dilatation.        SPLEEN: Homogeneous. No splenomegaly.        ADRENALS: No mass.        KIDNEYS: No mass. No obstructive uropathy.  No evidence of  urolithiasis.        GI TRACT: Non-dilated. No definite wall thickening.        PERITONEUM: No free air. No free fluid or loculated fluid  collections.           ABDOMINAL WALL: No focal hernia or mass.           OTHER: None.     BONES: No acute bony abnormality.       Impression:       Impression:  1. Unremarkable exam.  No source for the patient symptoms.  2. Other incidental/non-acute findings as above.     This report was finalized on 8/5/2019 5:46 PM by Dr. Harrison Biswas MD.           I have personally reviewed the radiology images and read the final radiology report.    Assessment & Plan      Assessment:  Hypothermia secondary to depakote, resolved  HTN accelerated  SIRS   DM2, ID with hyperglycemia  CKD stage 3  Newly diagnosed seizure activity, non-convulsive with aphasia,  resolved  Hypothryroidism  PAD  Chronic RLE OM  Depression  H/O tibia fibula fracture, in cast      Plan:  Hypothermia secondary to Depakote resolved.  This is the third episode of hypothermia, 2 episodes happened during [facility] Hospital[facility]. discussed with Dr. Caban, neurologist at [facility] and she advised to decrease the dose to half for 24 hours and then stop it and then keep the patient for observation for 24 more hours.  Continue with Keppra.  Patient does not have history of adrenal insufficiency.  Patient was started on fludrocortisone for orthostatic hypotension which was discontinued in March 2019.  Cortisol level this morning normal.    Hypertension excoriated.  Patient has been taking Toprol-XL and lisinopril along with clonidine as needed.  She was not taking hydralazine at home.  For now we will continue with lisinopril hydralazine and discontinue Toprol-XL and start Coreg.  Monitor and adjust antihypertensives accordingly.    Sirs with hypothermia and minimal elevated CRP.  No infectious etiology identified.  CRP trended down.  Patient currently afebrile and VSS.  No leukocytosis.  Blood cultures are negative prelim. CT with some findings of enteritis but patient is currently asymptomatic.  Will discontinue Rocephin and Flagyl.    Diabetes mellitus type 2 insulin depression with hyperglycemia, A1c 9.  Will start on Levemir and continue with moderate dose sliding scale.  Continue with ADA diet.    CKD stage III.  Creatinine stable around 1.2.  Continue with IV fluids and monitor closely    Newly diagnosed seizure activity, nonconvulsive with aphasia, per the neurologist Dr. Caban at [facility] likely secondary to cefepime, continue with Keppra and decrease dose of Depakote since likely Depakote is causing hypothermia.    For hypothyroidism new onset with elevated TSH and low free T3 and free T4, will start on low-dose levothyroxine    Continue with Plavix and Pravastatin  for PAD.    For chronic right lower extremity osteomyelitis, resume home Keflex from tomorrow.    For depression, continue with Lexapro and BuSpar    Heparin subcu for DVT prophylaxis  PT OT consult  Transfer to telemetry      The patient is high risk due to the following diagnoses/reasons:  Hypothermia secondary to depakote, resolved  HTN accelerated    Samanta Ibarra MD  08/06/19  5:56 PM

## 2019-08-06 NOTE — PLAN OF CARE
Problem: Patient Care Overview  Goal: Plan of Care Review  Outcome: Ongoing (interventions implemented as appropriate)   08/05/19 2345   Coping/Psychosocial   Plan of Care Reviewed With patient     Goal: Discharge Needs Assessment   08/05/19 2300 08/05/19 2345   Discharge Needs Assessment   Concerns to be Addressed --  denies needs/concerns at this time   Patient/Family Anticipates Transition to --  home with family   Patient/Family Anticipated Services at Transition --     Transportation Concerns --  car, none   Transportation Anticipated --  family or friend will provide   Disability   Equipment Currently Used at Home walker, rolling;walker, standard;commode;bath bench;glucometer;wheelchair --        Problem: Fall Risk (Adult)  Goal: Absence of Fall  Outcome: Ongoing (interventions implemented as appropriate)   08/05/19 2345   Fall Risk (Adult)   Absence of Fall making progress toward outcome       Problem: Pain, Chronic (Adult)  Goal: Acceptable Pain/Comfort Level and Functional Ability  Outcome: Ongoing (interventions implemented as appropriate)   08/05/19 2345   Pain, Chronic (Adult)   Acceptable Pain/Comfort Level and Functional Ability making progress toward outcome       Problem: Skin Injury Risk (Adult)  Goal: Skin Health and Integrity  Outcome: Ongoing (interventions implemented as appropriate)   08/05/19 2345   Skin Injury Risk (Adult)   Skin Health and Integrity making progress toward outcome

## 2019-08-06 NOTE — PLAN OF CARE
Problem: Fall Risk (Adult)  Goal: Absence of Fall  Outcome: Ongoing (interventions implemented as appropriate)      Problem: Pain, Chronic (Adult)  Goal: Acceptable Pain/Comfort Level and Functional Ability  Outcome: Ongoing (interventions implemented as appropriate)      Problem: Skin Injury Risk (Adult)  Goal: Skin Health and Integrity  Outcome: Ongoing (interventions implemented as appropriate)

## 2019-08-06 NOTE — OUTREACH NOTE
Medical Week 2 Survey      Responses   Facility patient discharged from?  [facility]   Does the patient have one of the following disease processes/diagnoses(primary or secondary)?  Other   Week 2 attempt successful?  No   Revoke  Readmitted          Rosie Ventura RN

## 2019-08-06 NOTE — H&P
Hospitalist History and Physical        Patient Identification  Name: Reny Elena  Age/Sex: 59 y.o. female  :  1960        MRN: [id number]  Visit Number: [id number]  PCP: Yandel Paulson MD      Chief complaint freezing cold, low body temperature    History of Present Illness:  Patient is a 59 y.o. female with history of type II DM, diastolic dysfunction, HTN, PAD, and chronic osteomyelitis of the right lower extremity. She recently was hospitalized at [facility] from - for expressive aphasia initially suspected to be secondary to CVA. It was later suspected that her symptoms were due to nonconvulsive seizures based on EEG findings, and that the seizures were possibly related to cefepime which she had been receiving for chronic osteomyelitis. She was started on depakote and keppra. Cefepime was changed by ID to rocephin and later to keflex at time of discharge. She reports that while in the hospital she had two episodes of hypothermia, which her physicians could not explain.     She had been doing relatively well since discharge until the morning of , when she woke up feeling extremely cold. She checked her temperature and it was only 90 F. Her  tried to find her glucometer to check her glucose level but could not find it. She ate some jello and came on to the hospital. She states most of the time in the ED she was in a fog and cannot remember initial details. She was noted to be sleeping most of her ED stay earlier on but later was awake, alert and oriented. Per ED note, her temp was 92.2 rectal on arrival. Temperature improved with heating blanket. Glucose was 125. Creatinine was up slightly from baseline and BUN:cr ratio was 27. WBC was normal while CRP was up slightly at 2.94. Valproic acid level was normal at 99. UA showed 2+ bacteria, 3-5 WBC but was negative for nitrite or leuk esterase and patient denies any urinary symptoms. CXR and CT chest were unremarkable, while  CT abdomen showed nonspecific LUQ small bowel wall thickening that could represent enteritis or other focal inflammatory process. Patient reportedly had some LUQ tenderness on exam in the ED, though she denies any pain in the PCU and is non-tender on my exam now. Cortisol level is 17. Upon further questioning, patient recalls that at one time she may have been on fludricortisone b/c of low cortisol levels, but her cardiologist Dr Helton ultimately discontinued this medication. She has been admitted to the PCU for further workup and management.     Review of Systems  Review of Systems   Constitutional: Positive for chills, fatigue and fever. Negative for activity change, appetite change, diaphoresis and unexpected weight change.   HENT: Negative for congestion, postnasal drip, rhinorrhea, sinus pressure, sinus pain and sore throat.    Eyes: Negative for photophobia, pain, discharge, redness, itching and visual disturbance.   Respiratory: Negative for cough, shortness of breath and wheezing.    Cardiovascular: Negative for chest pain, palpitations and leg swelling.   Gastrointestinal: Negative for abdominal distention, abdominal pain, constipation, diarrhea, nausea and vomiting.   Endocrine: Negative for cold intolerance, heat intolerance, polydipsia, polyphagia and polyuria.   Genitourinary: Negative for difficulty urinating, dysuria, flank pain and hematuria.   Musculoskeletal: Positive for myalgias (right leg pain, chronic from chronic osteomyelitis). Negative for arthralgias, back pain and joint swelling.   Skin: Positive for wound (right leg surgical wound, appears to be healing well). Negative for color change, pallor and rash.   Allergic/Immunologic: Negative for environmental allergies, food allergies and immunocompromised state.   Neurological: Negative for dizziness, tremors, seizures, syncope, weakness, light-headedness, numbness and headaches.   Hematological: Negative for adenopathy. Does not bruise/bleed  easily.   Psychiatric/Behavioral: Negative for agitation, behavioral problems and confusion.       History  Past Medical History:   Diagnosis Date   • Arthritis    • Closed fracture of right fibula and tibia 2018   • Depression    • Diabetic ulcer of left foot associated with type 2 diabetes mellitus (CMS/Formerly [facility]) 2017   • Diastolic dysfunction    • Essential hypertension    • Hyperlipidemia    • Iron deficiency anemia 2018   • PAD (peripheral artery disease) (CMS/Formerly [facility])    • Type 2 diabetes mellitus with hyperglycemia, with long-term current use of insulin (CMS/Formerly [facility]) 2018     Past Surgical History:   Procedure Laterality Date   • BACK SURGERY      Post spinal diskectomy, osteophytectomy in one lumbar interspace   • CATARACT EXTRACTION      Left    •  SECTION     • DENTAL PROCEDURE     • HYSTERECTOMY     • INCISION AND DRAINAGE LEG Left 4/15/2017    Procedure: INCISION AND DRAINAGE LOWER EXTREMITY;  Surgeon: Basilio Morris MD;  Location: [facility] OR;  Service:    • INCISION AND DRAINAGE LEG Left 2017    Procedure: Irrigation and Debridment abcess diabetic wound left foot with deep culture;  Surgeon: Basilio Morris MD;  Location: [facility] OR;  Service:    • KNEE ARTHROSCOPY Right    • ORIF TIBIA FRACTURE Right 2018    Procedure: TIBIAL  FRACTURE OPEN REDUCTION INTERNAL FIXATION;  Surgeon: Jung Barragan MD;  Location: [facility] OR;  Service: Orthopedics   • TOE AMPUTATION Right     5th   • TUBAL ABDOMINAL LIGATION       Family History   Problem Relation Age of Onset   • Heart disease Mother    • Cancer Mother    • COPD Mother    • Diabetes Other    • Depression Other      Social History     Tobacco Use   • Smoking status: Never Smoker   • Smokeless tobacco: Never Used   Substance Use Topics   • Alcohol use: No     Frequency: Never   • Drug use: No     Medications Prior to Admission   Medication Sig Dispense Refill Last Dose   • atorvastatin (LIPITOR) 40 MG tablet Take 1 tablet by mouth  Daily. 30 tablet 0 Taking   • busPIRone (BUSPAR) 10 MG tablet Take 1 tablet by mouth Every 8 (Eight) Hours.   Taking   • Cephalexin 500 MG tablet Take 500 mg by mouth Daily. 30 tablet 0    • CloNIDine (CATAPRES) 0.1 MG tablet Take 0.1 mg by mouth Every 6 (Six) Hours As Needed for High Blood Pressure (greater than 160/90).   Not Taking   • clopidogrel (PLAVIX) 75 MG tablet Take 1 tablet by mouth Daily. 30 tablet 4 Taking   • divalproex (DEPAKOTE) 500 MG DR tablet Take 2 tablets by mouth Every 12 (Twelve) Hours. 120 tablet 0    • FLUoxetine (PROzac) 20 MG capsule Take 20 mg by mouth.   Taking   • furosemide (LASIX) 20 MG tablet Take 1 tablet by mouth Daily As Needed (Increased shortness of breath or swelling.). 30 tablet 0 Taking   • gabapentin (NEURONTIN) 800 MG tablet Take 1 tablet by mouth Every 12 (Twelve) Hours.      • hydrALAZINE (APRESOLINE) 25 MG tablet Take 1 tablet by mouth Every 8 (Eight) Hours. 90 tablet 0 Taking   • HYDROcodone-acetaminophen (NORCO)  MG per tablet Take 1 tablet by mouth Every 4 (Four) Hours As Needed for Mild Pain . Pharmacy directions states every 4 to 6 hours prn pain.   Taking   • hydrOXYzine (ATARAX) 25 MG tablet Take 25 mg by mouth Daily As Needed for Anxiety.   Taking   • insulin aspart (novoLOG) 100 UNIT/ML injection Sliding scale TID with meals. Glucose 150-199:2 units. 240-249: 3 units. 250-299- 4 units. 300-349- 5 units. 350-400 :6 units. Over 400: 7un (Patient taking differently: Inject 0-6 Units under the skin into the appropriate area as directed 3 (Three) Times a Day Before Meals. Sliding scale TID with meals. Glucose 150-199:2 units. 240-249: 3 units. 250-299- 4 units. 300-349- 5 units. 350-400 :6 units. Over 400: 7un)  12 Taking   • Insulin Glargine (BASAGLAR KWIKPEN) 100 UNIT/ML injection pen Inject 25 Units under the skin into the appropriate area as directed 2 (Two) Times a Day. 1 mL 12 Taking   • levETIRAcetam (KEPPRA) 750 MG tablet Take 1 tablet by mouth Every  12 (Twelve) Hours. 60 tablet 0    • lisinopril (PRINIVIL,ZESTRIL) 40 MG tablet Take 1 tablet by mouth Daily. 30 tablet 0    • metoprolol succinate XL (TOPROL-XL) 50 MG 24 hr tablet Take 1 tablet by mouth Daily. 30 tablet 5 Taking   • multivitamin (DAILY FRAN) tablet tablet Take 1 tablet by mouth Daily. 30 tablet 0 Taking   • pantoprazole (PROTONIX) 40 MG EC tablet Take 40 mg by mouth Daily.   Taking   • potassium chloride (K-DUR) 10 MEQ CR tablet Take 1 tablet by mouth Daily As Needed (Only take on days you take Lasix.). 30 tablet 0 Taking   • rOPINIRole (REQUIP) 0.5 MG tablet Take 0.5 mg by mouth 2 (Two) Times a Day. Take 1 hour before bedtime.   Not Taking   • temazepam (RESTORIL) 30 MG capsule Take 30 mg by mouth Every Night.   Taking   • tiZANidine (ZANAFLEX) 4 MG tablet Take 4 mg by mouth Every 6 (Six) Hours As Needed for Muscle Spasms.   Taking   • vitamin D (ERGOCALCIFEROL) 05726 units capsule capsule Take 50,000 Units by mouth 1 (One) Time Per Week. Prior to [facility] Admission, Patient was on: takes on wednesdays   Not Taking     Allergies:  Penicillins; Codeine; and Sulfa antibiotics    Objective     Vital Signs  Temp:  [92.2 °F (33.4 °C)-98.2 °F (36.8 °C)] 98 °F (36.7 °C)  Heart Rate:  [51-64] 64  Resp:  [16-20] 18  BP: ()/(49-99) 143/64  Body mass index is 20.45 kg/m².    Physical Exam:  Physical Exam   Constitutional: She is oriented to person, place, and time.   Chronically ill appearing female, pleasant, no acute distress   HENT:   Head: Normocephalic and atraumatic.   Mouth/Throat: Oropharynx is clear and moist.   Eyes: Conjunctivae and EOM are normal. Pupils are equal, round, and reactive to light.   Neck: Normal range of motion. Neck supple. No JVD present.   Cardiovascular: Normal rate, regular rhythm, normal heart sounds and intact distal pulses.   No murmur heard.  Pulmonary/Chest: Effort normal and breath sounds normal. No respiratory distress. She has no wheezes. She has no rales. She  exhibits no tenderness.   Abdominal: Soft. Bowel sounds are normal. She exhibits no distension. There is no tenderness.   Musculoskeletal: Normal range of motion. She exhibits deformity (right leg with protective splint and wrapped in ACE bandage; removal of bandage reveals surgical incisions of various ages which appear to be healing fairly well). She exhibits no edema.   Lymphadenopathy:     She has no cervical adenopathy.   Neurological: She is alert and oriented to person, place, and time.   No gross focal deficits appreciated   Skin: Skin is warm and dry. Capillary refill takes less than 2 seconds. No rash noted. No erythema. No pallor.   See surgical wounds above   Psychiatric: She has a normal mood and affect. Her behavior is normal. Judgment and thought content normal.         Results Review:       Lab Results:  Results from last 7 days   Lab Units 08/05/19 1144 07/30/19  0459   WBC 10*3/mm3 5.51 6.73   HEMOGLOBIN g/dL 13.2 9.8*   PLATELETS 10*3/mm3 160 140     Results from last 7 days   Lab Units 08/05/19 1144 07/30/19  0539   CRP mg/dL 2.94* 0.15     Results from last 7 days   Lab Units 08/05/19  1144 07/30/19  0459   SODIUM mmol/L 139 143   POTASSIUM mmol/L 3.8 3.6   CHLORIDE mmol/L 98 107   CO2 mmol/L 25.3 24.0   BUN mg/dL 33* 29*   CREATININE mg/dL 1.19* 1.04*   CALCIUM mg/dL 8.9 8.1*   GLUCOSE mg/dL 178* 84     Results from last 7 days   Lab Units 08/05/19 1144 07/30/19  0459   MAGNESIUM mg/dL 2.0 1.9     No results found for: HGBA1C  Results from last 7 days   Lab Units 08/05/19 1144 07/30/19  0459   BILIRUBIN mg/dL 0.3 <0.2*   ALK PHOS U/L 134* 107   AST (SGOT) U/L 17 10   ALT (SGPT) U/L 14 7     Results from last 7 days   Lab Units 08/05/19 1144 07/30/19  2116   TROPONIN T ng/mL <0.010 <0.010                   I have reviewed the patient's laboratory results.    Imaging:  Imaging Results (last 72 hours)     Procedure Component Value Units Date/Time    CT Abdomen Pelvis Without Contrast  [[id number]] Collected:  08/05/19 1747     Updated:  08/05/19 1822    Narrative:          CT ABDOMEN PELVIS WO CONTRAST-      TECHNIQUE: Multiple axial CT images were obtained from lung bases  through pubic symphysis without administration of IV contrast.  Reformatted images in the coronal and/or sagittal plane(s) were  generated from the axial data set to facilitate diagnostic accuracy  and/or surgical planning.  Oral Contrast:NONE.     Radiation dose reduction techniques were utilized per ALARA protocol.  Automated exposure control was initiated through either or EM[employer] or  DoseRight software packages by  protocol.             Clinical information  Sepsis      Comparison  NONE.     Findings  LOWER THORAX: Clear. No effusions.     ABDOMEN:        LIVER: Homogeneous. No focal hepatic mass or ductal dilatation.        GALLBLADDER: No dilation or stone identified.        PANCREAS: Unremarkable. No mass or ductal dilatation.        SPLEEN: Homogeneous. No splenomegaly.        ADRENALS: No mass.        KIDNEYS: No mass. No obstructive uropathy.  No evidence of  urolithiasis.        GI TRACT: NONSPECIFIC LEFT UPPER QUADRANT SMALL BOWEL WALL THICKENING  THAT COULD REPRESENT ENTERITIS OR OTHER FOCAL INFLAMMATORY PROCESS.        PERITONEUM: No free air. No free fluid or loculated fluid  collections.        MESENTERY: Unremarkable.        LYMPH NODES: No lymphadenopathy.        VASCULATURE: No evidence of aneurysm.        ABDOMINAL WALL: No focal hernia or mass.           OTHER: None.     PELVIS:        BLADDER: No focal mass or significant wall thickening        REPRODUCTIVE: Unremarkable as visualized.        APPENDIX: Nondistended. No surrounding inflammation.     BONES: No acute bony abnormality.       Impression:       Impression:  Nonspecific left upper quadrant small bowel wall thickening that could  represent enteritis or other focal inflammatory process.     This report was finalized on 8/5/2019 6:19 PM  by Dr. Harrison Biswas MD.       CT Chest Without Contrast [[id number]] Collected:  08/05/19 1746     Updated:  08/05/19 1820    Narrative:       CT CHEST WO CONTRAST-      TECHNIQUE: Multiple axial CT images were obtained from lung apex through  upper abdomen without administration of IV contrast. Reformatted images  in the coronal and/or sagittal plane(s) were generated from the axial  data set to facilitate diagnostic accuracy and/or surgical planning.  Oral Contrast:NONE.     Radiation dose reduction techniques were utilized per ALARA protocol.  Automated exposure control was initiated through either or [employer] or  DoseRight software packages by  protocol.             Clinical information  Sepsis      Comparison  NONE.     Findings  LUNGS: Unremarkable. No parenchymal soft tissue nodules.  No focal air  space disease.     HEART: Unremarkable.     PERICARDIUM: No effusion.     MEDIASTINUM: No masses. No enlarged lymph nodes.  No fluid collections.     PLEURA: No pleural effusion. No pleural mass or abnormal calcification.     MAJOR AIRWAYS: Clear. No intrinsic mass.     VASCULATURE: No evidence of aneurysm.     VISUALIZED UPPER ABDOMEN:        LIVER: Homogeneous. No focal hepatic mass or ductal dilatation.        SPLEEN: Homogeneous. No splenomegaly.        ADRENALS: No mass.        KIDNEYS: No mass. No obstructive uropathy.  No evidence of  urolithiasis.        GI TRACT: Non-dilated. No definite wall thickening.        PERITONEUM: No free air. No free fluid or loculated fluid  collections.           ABDOMINAL WALL: No focal hernia or mass.           OTHER: None.     BONES: No acute bony abnormality.       Impression:       Impression:  1. Unremarkable exam.  No source for the patient symptoms.  2. Other incidental/non-acute findings as above.     This report was finalized on 8/5/2019 5:46 PM by Dr. Harrison Biswas MD.       XR Chest 1 View [[id number]] Collected:  08/05/19 1241     Updated:  08/05/19 1243     Narrative:       EXAMINATION: XR CHEST 1 VW-      CLINICAL INDICATION:     hypothermia     TECHNIQUE:  XR CHEST 1 VW-      COMPARISON: NONE      FINDINGS:   The lungs remain aerated.  Heart and mediastinum contours are unremarkable.  No pleural effusion.  No pneumothorax.   Bony and soft tissue structures are unremarkable.       Impression:       No radiographic evidence of acute cardiac or pulmonary  disease.     This report was finalized on 8/5/2019 12:41 PM by Dr. Harrison Biswas MD.           I have personally reviewed the patient's radiologic imaging.          EKG: Sinus bradycardia, HR 53, QTc 476. No overt ST changes to suggest acute ischemia appreciated.     I have personally reviewed the patient's EKG.          Assessment/Plan     - Hypothermia: suspect could have been precipitated by hypoglycemia though cannot confirm this as patient could not find glucometer at home to check glucose and ate jello before coming to the ED. Underlying infectious process on differential, most likely source felt to be intra-abdominal based on exam findings (in ED) and CT findings. Monitor accuchecks q2h. On empiric IV antibiotics including rocephin and flagyl, continue to monitor CRP and follow up on blood cultures. Random cortisol 17. Repeat at 8 am, if low will perform cosyntropin test. Per patient account, she may have been on fludricortisone in the past which raises suspicion for underlying adrenal insufficiency. Patient does have mildly elevated TSH though higher on 7/24/19 and free T4 normal at that time. Repeat free T4 and T3 pending.   - SIRS criteria with hypothermia and CRP elevation: again, suspect possible intra-abdominal process, see plan above.  - Type II DM, insulin dependent: A1c 9.2% as of 7/2019. Monitor accuchecks q2h to investigate for hypoglycemic episodes as noted above. Hold long-acting insulin for now, but make low dose SSI available as needed.  - Mild acute on stage III CKD: gently hydrate with IV  fluids. Avoid nephrotoxic meds (lasix, lisinopril). Renally dose gabapentin. Repeat labs in the AM.  - Newly diagnosed seizure activity, non-convulsive: continue keppra and depakote. Keppra level pending (send-out), depakote level normal.   - PAD: continue home plavix  - Chronic right lower extremity osteomyelitis: on rocephin for now which should suffice (placed on keflex at time of discharge from [facility] earlier this month).   - Hypertension: continue home hydralazine with hold parameters for now. Hold metoprolol due to borderline bradycardia, and hold lisinopril and lasix for reasons explained above.     DVT Prophylaxis: SQ heparin    Estimated Length of Stay >2 midnights    I discussed the patient's findings, assessment and plan with the patient and her RN Claudia who was present during my interview and exam on the PCU.    * patient is high risk due to hypothermia, possible sepsis, mild acute on CKD, PAD, newly diagnosed non-convulsive seizures, chronic osteomyelitis    Erwin Tatum MD  08/06/19  2:38 AM

## 2019-08-06 NOTE — THERAPY EVALUATION
Acute Care - Physical Therapy Initial Evaluation   [facility]     Patient Name: Reny Elena  : 1960  MRN: [id number]  Today's Date: 2019   Onset of Illness/Injury or Date of Surgery: 19  Date of Referral to PT: 19  Referring Physician: Dr. Ibarra      Admit Date: 2019    Visit Dx:     ICD-10-CM ICD-9-CM   1. Hypothermia, initial encounter T68.XXXA 991.6   2. Enteritis K52.9 558.9     Patient Active Problem List   Diagnosis   • Severe sepsis (CMS/[facility])   • Tibia/fibula fracture   • Iron deficiency anemia   • Type 2 diabetes mellitus with hyperglycemia, with long-term current use of insulin (CMS/[facility])   • Wound of right ankle   • Hyperglycemia   • Confusion   • Cellulitis   • Metabolic encephalopathy   • History of non-ST elevation myocardial infarction (NSTEMI)   • CKD (chronic kidney disease) stage 3, GFR 30-59 ml/min (CMS/[facility])   • Elevated troponin   • HTN (hypertension)   • CVA (cerebral vascular accident) (CMS/[facility])   • Chronic diastolic CHF (congestive heart failure) (CMS/[facility])   • Decubitus ulcer of coccyx, unspecified pressure ulcer stage   • Depression   • Hyperkalemia   • Expressive aphasia   • Impaired mobility and ADLs   • Hypothermia     Past Medical History:   Diagnosis Date   • Arthritis    • Closed fracture of right fibula and tibia 2018   • Depression    • Diabetic ulcer of left foot associated with type 2 diabetes mellitus (CMS/[facility]) 2017   • Diastolic dysfunction    • Essential hypertension    • Hyperlipidemia    • Iron deficiency anemia 2018   • PAD (peripheral artery disease) (CMS/[facility])    • Type 2 diabetes mellitus with hyperglycemia, with long-term current use of insulin (CMS/[facility]) 2018     Past Surgical History:   Procedure Laterality Date   • BACK SURGERY      Post spinal diskectomy, osteophytectomy in one lumbar interspace   • CATARACT EXTRACTION      Left    •  SECTION     • DENTAL PROCEDURE     • HYSTERECTOMY     • INCISION AND DRAINAGE  LEG Left 4/15/2017    Procedure: INCISION AND DRAINAGE LOWER EXTREMITY;  Surgeon: Basilio Morris MD;  Location: [facility] OR;  Service:    • INCISION AND DRAINAGE LEG Left 5/26/2017    Procedure: Irrigation and Debridment abcess diabetic wound left foot with deep culture;  Surgeon: Basilio Morris MD;  Location: [facility] OR;  Service:    • KNEE ARTHROSCOPY Right    • ORIF TIBIA FRACTURE Right 12/28/2018    Procedure: TIBIAL  FRACTURE OPEN REDUCTION INTERNAL FIXATION;  Surgeon: Jung Barragan MD;  Location: [facility] OR;  Service: Orthopedics   • TOE AMPUTATION Right     5th   • TUBAL ABDOMINAL LIGATION          PT ASSESSMENT (last 12 hours)      Physical Therapy Evaluation     Row Name 08/06/19 1517          PT Evaluation Time/Intention    Subjective Information  no complaints  -AG     Document Type  evaluation  -AG     Mode of Treatment  physical therapy  -AG     Patient Effort  good  -AG     Symptoms Noted During/After Treatment  fatigue  -AG     Comment  hx chronic osteomyelitis  -AG     Row Name 08/06/19 1517          General Information    Patient Profile Reviewed?  yes  -AG     Onset of Illness/Injury or Date of Surgery  08/05/19  -AG     Referring Physician  Dr. Ibarra  -AG     Patient Observations  alert;cooperative;agree to therapy  -AG     Patient/Family Observations  pt. supine; R lower leg splinted.  Pt. pleasant and cooperative.   -AG     Prior Level of Function  independent:;community mobility  -AG     Equipment Currently Used at Home  walker, rolling  -AG     Pertinent History of Current Functional Problem  pt. admitted with hypothermia; hx of chronic osteomyelitis   -AG     Existing Precautions/Restrictions  fall;non-weight bearing  -AG     Limitations/Impairments  safety/cognitive  -AG     Equipment Issued to Patient  walker, front wheeled  -AG     Equipment Ordered for Patient  gait belt  -AG     Risks Reviewed  patient:;LOB;dizziness;increased discomfort  -AG     Benefits Reviewed  patient:;improve  function;increase independence;increase strength;increase balance;decrease risk of DVT;increase knowledge  -     Barriers to Rehab  medically complex;previous functional deficit  -AG     Row Name 08/06/19 1517          Relationship/Environment    Lives With  spouse  -AG     Family Caregiver if Needed  child(kristine), adult  -AG     Row Name 08/06/19 1517          Resource/Environmental Concerns    Current Living Arrangements  home/apartment/condo  -AG     Row Name 08/06/19 1517          Living Environment    Living Arrangements  house  -AG     Home Accessibility  wheelchair accessible  -AG     Row Name 08/06/19 1517          Home Main Entrance    Number of Stairs, Main Entrance  none  -AG     Row Name 08/06/19 1517          Cognitive Assessment/Intervention- PT/OT    Orientation Status (Cognition)  oriented x 3  -AG     Follows Commands (Cognition)  WFL  -AG     Row Name 08/06/19 1517          Safety Issues, Functional Mobility    Safety Issues Affecting Function (Mobility)  safety precaution awareness;safety precautions follow-through/compliance  -     Impairments Affecting Function (Mobility)  balance;endurance/activity tolerance;strength  -AG     Row Name 08/06/19 1517          Mobility Assessment/Treatment    Extremity Weight-bearing Status  left lower extremity;right lower extremity  -AG     Left Lower Extremity (Weight-bearing Status)  full weight-bearing (FWB)  -AG     Right Lower Extremity (Weight-bearing Status)  non weight-bearing (NWB)  -     Row Name 08/06/19 1517          Bed Mobility Assessment/Treatment    Bed Mobility Assessment/Treatment  scooting/bridging;supine-sit  -AG     Scooting/Bridging Novelty (Bed Mobility)  supervision;verbal cues;nonverbal cues (demo/gesture)  -     Supine-Sit Novelty (Bed Mobility)  supervision;verbal cues;nonverbal cues (demo/gesture)  -AG     Bed Mobility, Safety Issues  decreased use of arms for pushing/pulling;decreased use of legs for bridging/pushing   -AG     Assistive Device (Bed Mobility)  bed rails  -AG     Row Name 08/06/19 1517          Transfer Assessment/Treatment    Transfer Assessment/Treatment  sit-stand transfer;stand-sit transfer;stand pivot/stand step transfer  -AG     Maintains Weight-bearing Status (Transfers)  verbal cues to maintain  -AG     Sit-Stand [place] (Transfers)  verbal cues;nonverbal cues (demo/gesture);contact guard  -AG     Stand-Sit [place] (Transfers)  verbal cues;nonverbal cues (demo/gesture);contact guard  -AG     Row Name 08/06/19 1517          Sit-Stand Transfer    Assistive Device (Sit-Stand Transfers)  walker, front-wheeled  -AG     Row Name 08/06/19 1517          Stand-Sit Transfer    Assistive Device (Stand-Sit Transfers)  walker, front-wheeled  -AG     Row Name 08/06/19 1517          Stand Pivot/Stand Step Transfer    Stand Pivot/Stand Step [place]  verbal cues;nonverbal cues (demo/gesture);contact guard  -AG     Assistive Device (Stand Pivot Stand Step Transfer)  walker, front-wheeled  -AG     Row Name 08/06/19 1517          Gait/Stairs Assessment/Training    [place] Level (Gait)  verbal cues;nonverbal cues (demo/gesture);contact guard  -AG     Assistive Device (Gait)  walker, front-wheeled  -AG     Distance in Feet (Gait)  30  -AG     Pattern (Gait)  swing-to  -AG     Deviations/Abnormal Patterns (Gait)  gait speed decreased;stride length decreased  -AG     Bilateral Gait Deviations  forward flexed posture  -AG     Row Name 08/06/19 1517          General ROM    GENERAL ROM COMMENTS  B LE AROM WFL; R ankle NT  -AG     Row Name 08/06/19 1517          MMT (Manual Muscle Testing)    General MMT Comments  4/5 B LE  -AG     Row Name 08/06/19 1517          Motor Assessment/Intervention    Additional Documentation  Balance (Group);Balance Interventions (Group)  -AG     Row Name 08/06/19 1517          Balance    Balance  static sitting balance;static standing balance;dynamic sitting balance;dynamic standing  balance  -AG     [facility] Name 08/06/19 1517          Static Sitting Balance    Level of [place] (Unsupported Sitting, Static Balance)  conditional independence  -AG     Sitting Position (Unsupported Sitting, Static Balance)  sitting in chair;sitting on edge of bed  -AG     Time Able to Maintain Position (Unsupported Sitting, Static Balance)  more than 5 minutes  -[facility] Name 08/06/19 1517          Static Standing Balance    Level of [place] (Supported Standing, Static Balance)  contact guard assist  -AG     Assistive Device Utilized (Supported Standing, Static Balance)  walker, rolling  -AG     [facility] Name 08/06/19 1517          Sensory Assessment/Intervention    Sensory General Assessment  no sensation deficits identified  -AG     Row Name 08/06/19 1517          Vision Assessment/Intervention    Visual Impairment/Limitations  WFL  -[facility] Name 08/06/19 1517          Pain Assessment    Additional Documentation  Pain Scale: FACES Pre/Post-Treatment (Group)  -AG     Row Name 08/06/19 1517          Pain Scale: FACES Pre/Post-Treatment    Pain: FACES Scale, Pretreatment  0-->no hurt  -AG     Pain: FACES Scale, Post-Treatment  0-->no hurt  -AG     [facility] Name 08/06/19 1517          Coping    Observed Emotional State  calm;cooperative  -     Verbalized Emotional State  acceptance  -AG     [facility] Name 08/06/19 1517          Plan of Care Review    Plan of Care Reviewed With  patient  -[facility] Name 08/06/19 1517          Physical Therapy Clinical Impression    Date of Referral to PT  08/06/19  -AG     PT Diagnosis (PT Clinical Impression)  decr functional mobility  -AG     Prognosis (PT Clinical Impression)  good  -AG     Functional Level at Time of Evaluation (PT Clinical Impression)  CGA  -AG     Patient/Family Goals Statement (PT Clinical Impression)  return home  -AG     Criteria for Skilled Interventions Met (PT Clinical Impression)  yes;treatment indicated  -AG     Pathology/Pathophysiology Noted (Describe  Specifically for Each System)  musculoskeletal  -AG     Impairments Found (describe specific impairments)  aerobic capacity/endurance;ergonomics and body mechanics;gait, locomotion, and balance;joint integrity and mobility  -AG     Functional Limitations in Following Categories (Describe Specific Limitations)  community/leisure  -AG     Rehab Potential (PT Clinical Summary)  good, to achieve stated therapy goals  -AG     Predicted Duration of Therapy (PT)  hospital LOS  -AG     Care Plan Review (PT)  evaluation/treatment results reviewed;care plan/treatment goals reviewed;risks/benefits reviewed;current/potential barriers reviewed;patient/other agree to care plan  -AG     Row Name 08/06/19 1517          Physical Therapy Goals    Bed Mobility Goal Selection (PT)  bed mobility, PT goal 1  -AG     Transfer Goal Selection (PT)  transfer, PT goal 1  -AG     Gait Training Goal Selection (PT)  gait training, PT goal 1  -AG     Row Name 08/06/19 1517          Bed Mobility Goal 1 (PT)    Activity/Assistive Device (Bed Mobility Goal 1, PT)  bed mobility activities, all  -AG     [place] Level/Cues Needed (Bed Mobility Goal 1, PT)  conditional independence  -AG     Time Frame (Bed Mobility Goal 1, PT)  by discharge  -AG     Row Name 08/06/19 1517          Transfer Goal 1 (PT)    Activity/Assistive Device (Transfer Goal 1, PT)  sit-to-stand/stand-to-sit;bed-to-chair/chair-to-bed;other (see comments) appropriate a.d.  -AG     [place] Level/Cues Needed (Transfer Goal 1, PT)  standby assist  -AG     Time Frame (Transfer Goal 1, PT)  by discharge  -AG     Row Name 08/06/19 1517          Gait Training Goal 1 (PT)    Activity/Assistive Device (Gait Training Goal 1, PT)  gait (walking locomotion);assistive device use;decrease fall risk;diminish gait deviation;improve balance and speed;increase endurance/gait distance;other (see comments) appropriate a.d.  -AG     [place] Level (Gait Training Goal 1, PT)  standby assist   -AG     Distance (Gait Goal 1, PT)  80  -AG     Time Frame (Gait Training Goal 1, PT)  by discharge  -AG     Row Name 08/06/19 1517          Positioning and Restraints    Pre-Treatment Position  in bed  -AG     Post Treatment Position  chair  -AG     In Chair  notified nsg;sitting;call light within reach;encouraged to call for assist  -AG       User Key  (r) = Recorded By, (t) = Taken By, (c) = Cosigned By    Initials Name Provider Type    Anu Solorzano, PT Physical Therapist          PT Recommendation and Plan  Anticipated Discharge Disposition (PT): home with assist, home with home health  Planned Therapy Interventions (PT Eval): balance training, bed mobility training, gait training, home exercise program, patient/family education, strengthening, transfer training  Therapy Frequency (PT Clinical Impression): other (see comments)(3-5 times/ week per priority policy)  Outcome Summary/Treatment Plan (PT)  Anticipated Equipment Needs at Discharge (PT): (TBD)  Anticipated Discharge Disposition (PT): home with assist, home with home health  Plan of Care Reviewed With: patient  Outcome Measures     Row Name 08/06/19 1500             How much help from another person do you currently need...    Turning from your back to your side while in flat bed without using bedrails?  4  -AG      Moving from lying on back to sitting on the side of a flat bed without bedrails?  4  -AG      Moving to and from a bed to a chair (including a wheelchair)?  3  -AG      Standing up from a chair using your arms (e.g., wheelchair, bedside chair)?  3  -AG      Climbing 3-5 steps with a railing?  2  -AG      To walk in hospital room?  3  -AG      AM-PAC 6 Clicks Score (PT)  19  -AG         Functional Assessment    Outcome Measure Options  AM-PAC 6 Clicks Basic Mobility (PT)  -AG        User Key  (r) = Recorded By, (t) = Taken By, (c) = Cosigned By    Initials Name Provider Type    Anu Solorzano, PT Physical Therapist         Time  Calculation:   PT Charges     Row Name 08/06/19 1538             Time Calculation    PT Received On  08/06/19  -      PT - Next Appointment  08/07/19  -      PT Goal Re-Cert Due Date  08/20/19  -        User Key  (r) = Recorded By, (t) = Taken By, (c) = Cosigned By    Initials Name Provider Type    Anu Solorzano, PT Physical Therapist        Therapy Charges for Today     Code Description Service Date Service Provider Modifiers Qty    [id number] HC PT EVAL MOD COMPLEXITY 4 8/6/2019 Anu Chin, PT GP 1          PT G-Codes  Outcome Measure Options: AM-PAC 6 Clicks Basic Mobility (PT)  AM-PAC 6 Clicks Score (PT): 19      Anu Chin, PT  8/6/2019

## 2019-08-06 NOTE — PROGRESS NOTES
Discharge Planning Assessment  [facility]     Patient Name: Reny Elena  MRN: [id number]  Today's Date: 8/6/2019    Admit Date: 8/5/2019    Discharge Needs Assessment     Row Name 08/06/19 1143       Living Environment    Lives With  other (see comments)    Name(s) of Who Lives With Patient  Pt lives with an [facility].     Current Living Arrangements  home/apartment/condo    Primary Care Provided by  other (see comments) [facility].     Provides Primary Care For  no one, unable/limited ability to care for self    Family Caregiver if Needed  child(kristine), adult    Quality of Family Relationships  involved    Able to Return to Prior Arrangements  yes       Resource/Environmental Concerns    Transportation Concerns  car, none       Transition Planning    Patient/Family Anticipates Transition to  home with family;home with help/services    Patient/Family Anticipated Services at Transition  home health care       Discharge Needs Assessment    Equipment Currently Used at Home  wheelchair;walker, rolling;walker, standard;cane, quad;cane, straight;commode;shower chair    Equipment Needed After Discharge  none        Discharge Plan     Row Name 08/06/19 9425       Plan    Plan  SS spoke wit pt on this day. Pt lives at home with her live in [place] [facility], and [facility] ([facility] Pt has a wheelchair, standard walker, rolling walker, bedside commode, standard cane, quad cane, and shower chair. Pt does not have a POA or a living will. Pt uses [facility], and her PCP is Yandel Paulson. Pt plans to return home at discharge, with transportation provided by a family member. SS will follow and assist as needed.     Patient/Family in Agreement with Plan  yes          Demographic Summary     Row Name 08/06/19 1139       General Information    Admission Type  inpatient    Referral Source  nursing    Reason for Consult  discharge planning    Preferred Language  English     Used During  This Interaction  no            ADRIAN RosenW

## 2019-08-07 LAB
ANION GAP SERPL CALCULATED.3IONS-SCNC: 12.2 MMOL/L (ref 5–15)
BACTERIA SPEC AEROBE CULT: NO GROWTH
BASOPHILS # BLD AUTO: 0.03 10*3/MM3 (ref 0–0.2)
BASOPHILS NFR BLD AUTO: 0.7 % (ref 0–1.5)
BUN BLD-MCNC: 29 MG/DL (ref 8–23)
BUN/CREAT SERPL: 29 (ref 7–25)
CALCIUM SPEC-SCNC: 7.9 MG/DL (ref 8.6–10.5)
CHLORIDE SERPL-SCNC: 109 MMOL/L (ref 98–107)
CO2 SERPL-SCNC: 22.8 MMOL/L (ref 22–29)
CREAT BLD-MCNC: 1 MG/DL (ref 0.57–1)
DEPRECATED RDW RBC AUTO: 49.4 FL (ref 37–54)
EOSINOPHIL # BLD AUTO: 0.14 10*3/MM3 (ref 0–0.4)
EOSINOPHIL NFR BLD AUTO: 3.3 % (ref 0.3–6.2)
ERYTHROCYTE [DISTWIDTH] IN BLOOD BY AUTOMATED COUNT: 15.2 % (ref 12.3–15.4)
GFR SERPL CREATININE-BSD FRML MDRD: 56 ML/MIN/1.73
GLUCOSE BLD-MCNC: 132 MG/DL (ref 65–99)
GLUCOSE BLDC GLUCOMTR-MCNC: 116 MG/DL (ref 70–130)
GLUCOSE BLDC GLUCOMTR-MCNC: 122 MG/DL (ref 70–130)
GLUCOSE BLDC GLUCOMTR-MCNC: 233 MG/DL (ref 70–130)
GLUCOSE BLDC GLUCOMTR-MCNC: 250 MG/DL (ref 70–130)
GLUCOSE BLDC GLUCOMTR-MCNC: 48 MG/DL (ref 70–130)
GLUCOSE BLDC GLUCOMTR-MCNC: 53 MG/DL (ref 70–130)
GLUCOSE BLDC GLUCOMTR-MCNC: 72 MG/DL (ref 70–130)
GLUCOSE BLDC GLUCOMTR-MCNC: 87 MG/DL (ref 70–130)
HCT VFR BLD AUTO: 29.6 % (ref 34–46.6)
HGB BLD-MCNC: 9.1 G/DL (ref 12–15.9)
IMM GRANULOCYTES # BLD AUTO: 0.01 10*3/MM3 (ref 0–0.05)
IMM GRANULOCYTES NFR BLD AUTO: 0.2 % (ref 0–0.5)
LYMPHOCYTES # BLD AUTO: 1.26 10*3/MM3 (ref 0.7–3.1)
LYMPHOCYTES NFR BLD AUTO: 29.8 % (ref 19.6–45.3)
MAGNESIUM SERPL-MCNC: 1.8 MG/DL (ref 1.6–2.4)
MCH RBC QN AUTO: 28.5 PG (ref 26.6–33)
MCHC RBC AUTO-ENTMCNC: 30.7 G/DL (ref 31.5–35.7)
MCV RBC AUTO: 92.8 FL (ref 79–97)
MONOCYTES # BLD AUTO: 0.49 10*3/MM3 (ref 0.1–0.9)
MONOCYTES NFR BLD AUTO: 11.6 % (ref 5–12)
NEUTROPHILS # BLD AUTO: 2.3 10*3/MM3 (ref 1.7–7)
NEUTROPHILS NFR BLD AUTO: 54.4 % (ref 42.7–76)
PLATELET # BLD AUTO: 122 10*3/MM3 (ref 140–450)
PMV BLD AUTO: 11 FL (ref 6–12)
POTASSIUM BLD-SCNC: 3.7 MMOL/L (ref 3.5–5.2)
RBC # BLD AUTO: 3.19 10*6/MM3 (ref 3.77–5.28)
SODIUM BLD-SCNC: 144 MMOL/L (ref 136–145)
WBC NRBC COR # BLD: 4.23 10*3/MM3 (ref 3.4–10.8)

## 2019-08-07 PROCEDURE — 25010000002 HEPARIN (PORCINE) PER 1000 UNITS: Performed by: HOSPITALIST

## 2019-08-07 PROCEDURE — 94799 UNLISTED PULMONARY SVC/PX: CPT

## 2019-08-07 PROCEDURE — 97530 THERAPEUTIC ACTIVITIES: CPT

## 2019-08-07 PROCEDURE — 80048 BASIC METABOLIC PNL TOTAL CA: CPT | Performed by: HOSPITALIST

## 2019-08-07 PROCEDURE — 82962 GLUCOSE BLOOD TEST: CPT

## 2019-08-07 PROCEDURE — 83735 ASSAY OF MAGNESIUM: CPT | Performed by: HOSPITALIST

## 2019-08-07 PROCEDURE — G0108 DIAB MANAGE TRN  PER INDIV: HCPCS

## 2019-08-07 PROCEDURE — 63710000001 INSULIN ASPART PER 5 UNITS: Performed by: HOSPITALIST

## 2019-08-07 PROCEDURE — 99232 SBSQ HOSP IP/OBS MODERATE 35: CPT | Performed by: HOSPITALIST

## 2019-08-07 PROCEDURE — 85025 COMPLETE CBC W/AUTO DIFF WBC: CPT | Performed by: HOSPITALIST

## 2019-08-07 RX ORDER — NICOTINE POLACRILEX 4 MG
15 LOZENGE BUCCAL
Status: DISCONTINUED | OUTPATIENT
Start: 2019-08-07 | End: 2019-08-08 | Stop reason: HOSPADM

## 2019-08-07 RX ORDER — DEXTROSE MONOHYDRATE 25 G/50ML
25 INJECTION, SOLUTION INTRAVENOUS
Status: DISCONTINUED | OUTPATIENT
Start: 2019-08-07 | End: 2019-08-08 | Stop reason: HOSPADM

## 2019-08-07 RX ADMIN — HEPARIN SODIUM 5000 UNITS: 5000 INJECTION INTRAVENOUS; SUBCUTANEOUS at 08:23

## 2019-08-07 RX ADMIN — CEPHALEXIN 500 MG: 500 CAPSULE ORAL at 08:23

## 2019-08-07 RX ADMIN — LEVETIRACETAM 750 MG: 500 TABLET, FILM COATED ORAL at 08:24

## 2019-08-07 RX ADMIN — BUSPIRONE HYDROCHLORIDE 10 MG: 10 TABLET ORAL at 08:23

## 2019-08-07 RX ADMIN — BUSPIRONE HYDROCHLORIDE 10 MG: 10 TABLET ORAL at 17:21

## 2019-08-07 RX ADMIN — LISINOPRIL 40 MG: 10 TABLET ORAL at 08:23

## 2019-08-07 RX ADMIN — LEVETIRACETAM 750 MG: 500 TABLET, FILM COATED ORAL at 20:17

## 2019-08-07 RX ADMIN — INSULIN ASPART 4 UNITS: 100 INJECTION, SOLUTION INTRAVENOUS; SUBCUTANEOUS at 17:20

## 2019-08-07 RX ADMIN — HYDROCODONE BITARTRATE AND ACETAMINOPHEN 1 TABLET: 5; 325 TABLET ORAL at 08:29

## 2019-08-07 RX ADMIN — HYDRALAZINE HYDROCHLORIDE 25 MG: 25 TABLET ORAL at 06:23

## 2019-08-07 RX ADMIN — SODIUM CHLORIDE, PRESERVATIVE FREE 3 ML: 5 INJECTION INTRAVENOUS at 08:23

## 2019-08-07 RX ADMIN — HEPARIN SODIUM 5000 UNITS: 5000 INJECTION INTRAVENOUS; SUBCUTANEOUS at 20:17

## 2019-08-07 RX ADMIN — PRAVASTATIN SODIUM 20 MG: 40 TABLET ORAL at 20:17

## 2019-08-07 RX ADMIN — DIVALPROEX SODIUM 500 MG: 500 TABLET, DELAYED RELEASE ORAL at 08:23

## 2019-08-07 RX ADMIN — SODIUM CHLORIDE 50 ML/HR: 9 INJECTION, SOLUTION INTRAVENOUS at 06:23

## 2019-08-07 RX ADMIN — LEVOTHYROXINE SODIUM 25 MCG: 25 TABLET ORAL at 06:22

## 2019-08-07 RX ADMIN — INSULIN ASPART 3 UNITS: 100 INJECTION, SOLUTION INTRAVENOUS; SUBCUTANEOUS at 20:18

## 2019-08-07 RX ADMIN — ESCITALOPRAM 10 MG: 10 TABLET, FILM COATED ORAL at 08:23

## 2019-08-07 RX ADMIN — CLOPIDOGREL 75 MG: 75 TABLET, FILM COATED ORAL at 08:23

## 2019-08-07 RX ADMIN — BUSPIRONE HYDROCHLORIDE 10 MG: 10 TABLET ORAL at 01:26

## 2019-08-07 RX ADMIN — ROPINIROLE HYDROCHLORIDE 0.5 MG: 0.25 TABLET, FILM COATED ORAL at 20:17

## 2019-08-07 RX ADMIN — CARVEDILOL 12.5 MG: 6.25 TABLET, FILM COATED ORAL at 08:22

## 2019-08-07 RX ADMIN — GABAPENTIN 200 MG: 100 CAPSULE ORAL at 20:18

## 2019-08-07 RX ADMIN — SODIUM CHLORIDE, PRESERVATIVE FREE 3 ML: 5 INJECTION INTRAVENOUS at 20:18

## 2019-08-07 RX ADMIN — GABAPENTIN 200 MG: 100 CAPSULE ORAL at 08:23

## 2019-08-07 RX ADMIN — CARVEDILOL 12.5 MG: 6.25 TABLET, FILM COATED ORAL at 17:21

## 2019-08-07 RX ADMIN — HYDROCODONE BITARTRATE AND ACETAMINOPHEN 1 TABLET: 10; 325 TABLET ORAL at 20:17

## 2019-08-07 RX ADMIN — ROPINIROLE HYDROCHLORIDE 0.5 MG: 0.25 TABLET, FILM COATED ORAL at 08:23

## 2019-08-07 RX ADMIN — PANTOPRAZOLE SODIUM 40 MG: 40 TABLET, DELAYED RELEASE ORAL at 06:22

## 2019-08-07 RX ADMIN — Medication 1 CAPSULE: at 08:23

## 2019-08-07 NOTE — CONSULTS
Patient resting comfortably in bed, alert and oriented.  Patient reports that her blood glucose drops below 70 at least 3 times per week usually in the evening time.  Patient reports that this has been happening since long acting insulin was added to her medication regimen last year.  Patient reports that she has little to no appetite and often skips meals.  Counseled her on the importance of eating small frequent meals with protein to keep her blood glucose stabilized.    Counseled patient on diabetes basic handout which discusses exactly what diabetes is and how it affects the body.  Counseled patient on complications of hyperglycemia and ways to prevent it.  Counseled patient on hypoglycemia and treatment by using the rule of 15.  Counseled patient on the importance of checking blood glucose levels frequently and keeping a log to take to all MD appointments.  Counseled patient on how to care for themselves during sick day.  Stressed the importance of healthy eating with a limit of carbohydrates to 180 per day.  Counseled on importance of complying with medications as ordered by provider.  Counseled patient to seek medical attention if blood glucose above 300.  Counseled on importance of daily exercise. Patient verbalizes understanding of all information given. Encouraged patient to attend an outpatient diabetes smart class or attend diabetes support  group.  Left time and dates of classes with patient along with my name and contact information, will continue to monitor patient as needed.

## 2019-08-07 NOTE — PROGRESS NOTES
[facility] HOSPITALIST PROGRESS NOTE     Patient Identification:  Name:  Reny Elena  Age:  59 y.o.  Sex:  female  :  1960  MRN:  [id number]  Visit Number:  [id number]  ROOM: [address] [place] [address]     Primary Care Provider:  Yandel Paulson MD    Length of stay:  2    Subjective        Chief Compliant follow-up for hypothermia    Patient seen and examined this morning with no family at bedside.  States that she is feeling better.  Denies any chest pain, shortness of breath, nausea vomiting abdominal pain diarrhea. Noted to have symptomatic hypoglycemia this morning with CBG 48.   No further episodes of hypothermia.  On room air.      Objective     Current Hospital Meds:    busPIRone 10 mg Oral Q8H   carvedilol 12.5 mg Oral BID With Meals   cephalexin 500 mg Oral Daily   clopidogrel 75 mg Oral Daily   escitalopram 10 mg Oral Daily   gabapentin 200 mg Oral Q12H   heparin (porcine) 5,000 Units Subcutaneous Q12H   hydrALAZINE 25 mg Oral Q8H   insulin aspart 0-7 Units Subcutaneous 4x Daily AC & at Bedtime   insulin detemir 10 Units Subcutaneous Nightly   lactobacillus acidophilus 1 capsule Oral Daily   levETIRAcetam 750 mg Oral Q12H   levothyroxine 25 mcg Oral Q AM   lisinopril 40 mg Oral Q24H   pantoprazole 40 mg Oral Q AM   pravastatin 20 mg Oral Nightly   rOPINIRole 0.5 mg Oral BID   sodium chloride 3 mL Intravenous Q12H      ----------------------------------------------------------------------------------------------------------------------  Vital Signs:  Temp:  [97.8 °F (36.6 °C)-98.4 °F (36.9 °C)] 97.8 °F (36.6 °C)  Heart Rate:  [57-75] 75  Resp:  [12-22] 18  BP: (100-175)/(51-87) 105/51  SpO2:  [83 %-100 %] 98 %  on   ;   Device (Oxygen Therapy): room air  Body mass index is 19.6 kg/m².    Wt Readings from Last 3 Encounters:   19 53.4 kg (117 lb 12.8 oz)   19 52 kg (114 lb 11.2 oz)   19 59 kg (130 lb)       Intake/Output Summary (Last 24 hours) at 2019 1613  Last data  filed at 8/7/2019 1316  Gross per 24 hour   Intake 2114 ml   Output 900 ml   Net 1214 ml     Diet Regular; Cardiac, Consistent Carbohydrate  ----------------------------------------------------------------------------------------------------------------------  Physical exam:  General: Comfortable, Not in distress.  Well-developed and well-nourished.   HENT:  Head:  Normocephalic and atraumatic.  Mouth:  Moist mucous membranes.    Eyes:  Conjunctivae and EOM are normal.  Pupils are equal, round, and reactive to light.  No scleral icterus.    Neck:  Neck supple.  No JVD present.  trachea midline.  Cardiovascular:  Normal rate, regular rhythm with no murmur.  Pulmonary/Chest:  No respiratory distress, no wheezes, no crackles, with normal breath sounds and good air movement.  Abdomen:  Soft.  Bowel sounds are normal.  No distension and no tenderness. No organomegaly.   Musculoskeletal:  No edema, no tenderness, and no deformity.  No red or swollen joints anywhere.  RLE bandaged.  Neurological:  Alert and oriented to person, place, and time.  No cranial nerve deficit. No focal deficits. No facial droop.  No slurred speech.   Skin:  Skin is warm and dry. No rash noted. No pallor.   Peripheral vascular: pulses in all 4 extremities with no clubbing, no cyanosis, no edema.  Genitourinary: no tate  ----------------------------------------------------------------------------------------------------------------------  ----------------------------------------------------------------------------------------------------------------------  Results from last 7 days   Lab Units 08/07/19  0341 08/06/19  0235 08/05/19  1144   CRP mg/dL  --  1.66* 2.94*   LACTATE mmol/L  --   --  1.5   WBC 10*3/mm3 4.23 4.98 5.51   HEMOGLOBIN g/dL 9.1* 10.8* 13.2   HEMATOCRIT % 29.6* 35.2 41.1   MCV fL 92.8 94.4 90.3   MCHC g/dL 30.7* 30.7* 32.1   PLATELETS 10*3/mm3 122* 122* 160     Results from last 7 days   Lab Units 08/07/19 0341 08/06/19  0235  08/05/19  1144   SODIUM mmol/L 144 142 139   POTASSIUM mmol/L 3.7 3.7 3.8   MAGNESIUM mg/dL 1.8  --  2.0   CHLORIDE mmol/L 109* 106 98   CO2 mmol/L 22.8 21.9* 25.3   BUN mg/dL 29* 33* 33*   CREATININE mg/dL 1.00 1.23* 1.19*   EGFR IF NONAFRICN AM mL/min/1.73 56* 44* 46*   CALCIUM mg/dL 7.9* 7.9* 8.9   GLUCOSE mg/dL 132* 203* 178*   ALBUMIN g/dL  --  2.46* 3.34*   BILIRUBIN mg/dL  --  <0.2* 0.3   ALK PHOS U/L  --  104 134*   AST (SGOT) U/L  --  21 17   ALT (SGPT) U/L  --  11 14   Estimated Creatinine Clearance: 49.8 mL/min (by C-G formula based on SCr of 1 mg/dL).  Results from last 7 days   Lab Units 08/06/19  0235 08/05/19  1144   TROPONIN T ng/mL <0.010 <0.010     Glucose   Date/Time Value Ref Range Status   08/07/2019 0702 87 70 - 130 mg/dL Final   08/07/2019 0615 53 (L) 70 - 130 mg/dL Final   08/07/2019 0140 72 70 - 130 mg/dL Final   08/07/2019 0126 48 (C) 70 - 130 mg/dL Final   08/06/2019 2035 174 (H) 70 - 130 mg/dL Final   08/06/2019 1607 319 (H) 70 - 130 mg/dL Final   08/06/2019 1231 230 (H) 70 - 130 mg/dL Final   08/06/2019 0923 166 (H) 70 - 130 mg/dL Final     No results found for: AMMONIA    Results from last 7 days   Lab Units 08/05/19  1250   NITRITE UA  Negative   WBC UA /HPF 3-5*   BACTERIA UA /HPF 2+*   SQUAM EPITHEL UA /HPF 0-2   URINECX  No growth     Blood Culture   Date Value Ref Range Status   08/05/2019 No growth at 24 hours  Preliminary   08/05/2019 No growth at 24 hours  Preliminary          I have personally looked at the labs and they are summarized above.  ----------------------------------------------------------------------------------------------------------------------  Imaging Results (last 24 hours)     ** No results found for the last 24 hours. **        I have personally reviewed the radiology images and read the final radiology report.    Assessment & Plan      Assessment:  Hypothermia secondary to depakote, resolved  HTN accelerated  SIRS   DM2, ID with hyperglycemia  CKD stage  3  Newly diagnosed seizure activity, non-convulsive with aphasia, resolved  Hypothryroidism  PAD  Chronic RLE OM  Depression  H/O tibia fibula fracture, in cast      Plan:  Hypothermia secondary to Depakote resolved.  Will dc Depakote.     08/6:This is the third episode of hypothermia, 2 episodes happened during [facility] Hospitalization. discussed with Dr. Caban, neurologist at [facility] and she advised to decrease the dose to half for 24 hours and then stop it and then keep the patient for observation for 24 more hours.  Continue with Keppra.  Patient does not have history of adrenal insufficiency.  Patient was started on fludrocortisone for orthostatic hypotension which was discontinued in March 2019.  Cortisol level this morning normal.    Hypertension accelerated. BP stable. Continue with coreg and lisinopril and DC hydralazine.      Sirs with hypothermia and minimal elevated CRP.  No infectious etiology identified.  CRP trended down.  Patient currently afebrile and VSS.  No leukocytosis.  Blood cultures are negative prelim. CT with some findings of enteritis but patient is currently asymptomatic.  Will discontinue Rocephin and Flagyl.    Diabetes mellitus type 2 insulin depression with hyperglycemia, A1c 9.  continue with moderate dose sliding scale. Continue with ADA diet. Hypoglycemia today. Will decrease Levemir to 10 units and change to daily instead of nightly.     CKD stage III.  Creatinine stable around 1.0, baseline.  DC IV fluids and monitor closely    Newly diagnosed seizure activity, nonconvulsive with aphasia, per the neurologist Dr. Caban at [facility] likely secondary to cefepime, continue with Keppra and decrease dose of Depakote since likely Depakote is causing hypothermia.    For hypothyroidism new onset with elevated TSH and low free T3 and free T4, on low-dose levothyroxine    Continue with Plavix and Pravastatin for PAD.    For chronic right lower extremity  osteomyelitis, on home Keflex from tomorrow.    For depression, continue with Lexapro and BuSpar    Heparin subcu for DVT prophylaxis  PT OT on board  DC in 24-48 hrs.     The patient is high risk due to the following diagnoses/reasons:  Hypothermia secondary to depakote, resolved  HTN accelerated    Samanta Ibarra MD  08/07/19  4:13 PM

## 2019-08-07 NOTE — PLAN OF CARE
Problem: Fall Risk (Adult)  Goal: Identify Related Risk Factors and Signs and Symptoms  Outcome: Ongoing (interventions implemented as appropriate)      Problem: Pain, Chronic (Adult)  Goal: Acceptable Pain/Comfort Level and Functional Ability  Outcome: Ongoing (interventions implemented as appropriate)      Problem: Skin Injury Risk (Adult)  Goal: Skin Health and Integrity  Outcome: Ongoing (interventions implemented as appropriate)

## 2019-08-07 NOTE — PLAN OF CARE
Problem: Patient Care Overview  Goal: Plan of Care Review  Outcome: Ongoing (interventions implemented as appropriate)   08/07/19 0058   Coping/Psychosocial   Plan of Care Reviewed With patient     Goal: Discharge Needs Assessment  Outcome: Ongoing (interventions implemented as appropriate)   08/05/19 2345 08/06/19 1143 08/07/19 0058   Discharge Needs Assessment   Concerns to be Addressed --  --  denies needs/concerns at this time   Patient/Family Anticipates Transition to --  home with family;home with help/services --    Patient/Family Anticipated Services at Transition --  home health care --    Transportation Concerns --  car, none --    Transportation Anticipated family or friend will provide --  --    Equipment Needed After Discharge --  none --    Disability   Equipment Currently Used at Home --  wheelchair;walker, rolling;walker, standard;cane, quad;cane, straight;commode;shower chair --        Problem: Fall Risk (Adult)  Goal: Absence of Fall  Outcome: Outcome(s) achieved Date Met: 08/07/19 08/07/19 0058   Fall Risk (Adult)   Absence of Fall making progress toward outcome       Problem: Pain, Chronic (Adult)  Goal: Acceptable Pain/Comfort Level and Functional Ability  Outcome: Ongoing (interventions implemented as appropriate)   08/07/19 0058   Pain, Chronic (Adult)   Acceptable Pain/Comfort Level and Functional Ability making progress toward outcome       Problem: Skin Injury Risk (Adult)  Goal: Skin Health and Integrity  Outcome: Ongoing (interventions implemented as appropriate)   08/07/19 0058   Skin Injury Risk (Adult)   Skin Health and Integrity making progress toward outcome

## 2019-08-07 NOTE — THERAPY TREATMENT NOTE
Acute Care - Physical Therapy Treatment Note   [facility]     Patient Name: Reny Elena  : 1960  MRN: [id number]  Today's Date: 2019  Onset of Illness/Injury or Date of Surgery: 19  Date of Referral to PT: 19  Referring Physician: Dr. Ibarra    Admit Date: 2019    Visit Dx:    ICD-10-CM ICD-9-CM   1. Hypothermia, initial encounter T68.XXXA 991.6   2. Enteritis K52.9 558.9     Patient Active Problem List   Diagnosis   • Severe sepsis (CMS/[facility])   • Tibia/fibula fracture   • Iron deficiency anemia   • Type 2 diabetes mellitus with hyperglycemia, with long-term current use of insulin (CMS/[facility])   • Wound of right ankle   • Hyperglycemia   • Confusion   • Cellulitis   • Metabolic encephalopathy   • History of non-ST elevation myocardial infarction (NSTEMI)   • CKD (chronic kidney disease) stage 3, GFR 30-59 ml/min (CMS/[facility])   • Elevated troponin   • HTN (hypertension)   • CVA (cerebral vascular accident) (CMS/[facility])   • Chronic diastolic CHF (congestive heart failure) (CMS/[facility])   • Decubitus ulcer of coccyx, unspecified pressure ulcer stage   • Depression   • Hyperkalemia   • Expressive aphasia   • Impaired mobility and ADLs   • Hypothermia       Therapy Treatment    Rehabilitation Treatment Summary     Row Name 19 1429             Treatment Time/Intention    Discipline  physical therapy assistant  -RB      Document Type  therapy note (daily note)  -RB      Subjective Information  complains of;fatigue  -RB      Mode of Treatment  physical therapy;individual therapy  -RB      Patient/Family Observations  Pt supine in bed and states that she has already walked today and is tired from being moved from 2nd to 3rd floor.  Pt educated on the importance of participating with therapy and states that she will resume tomorrow.  -RB      Therapy Frequency (PT Clinical Impression)  other (see comments) 3-5 times/ week per priority policy  -RB      Patient Effort  good  -RB      Existing  Precautions/Restrictions  fall;non-weight bearing  -RB      Patient Response to Treatment  Pt supine in bed and states that she has already walked today and is tired from being moved from 2nd to 3rd floor.  Pt educated on the importance of participating with therapy and states that she will resume tomorrow.  -RB      Recorded by [RB] Jack Moyer, PTA 08/07/19 1431      Row Name 08/07/19 1429             Cognitive Assessment/Intervention- PT/OT    Orientation Status (Cognition)  oriented x 3  -RB      Follows Commands (Cognition)  WFL  -RB      Recorded by [RB] Jack Moyer, PTA 08/07/19 1431      Row Name 08/07/19 1429             Safety Issues, Functional Mobility    Impairments Affecting Function (Mobility)  balance;endurance/activity tolerance;strength  -RB      Recorded by [RB] Jack Moyer, PTA 08/07/19 1431      Row Name 08/07/19 1429             Mobility Assessment/Intervention    Extremity Weight-bearing Status  left lower extremity;right lower extremity  -RB      Left Lower Extremity (Weight-bearing Status)  full weight-bearing (FWB)  -RB      Right Lower Extremity (Weight-bearing Status)  non weight-bearing (NWB)  -RB      Recorded by [RB] Jack Moyer, PTA 08/07/19 1431      Row Name 08/07/19 1429             Transfer Assessment/Treatment    Transfer Assessment/Treatment  sit-stand transfer;stand-sit transfer;stand pivot/stand step transfer  -RB      Recorded by [RB] Jack Moyer, PTA 08/07/19 1431      Row Name 08/07/19 1429             Positioning and Restraints    Pre-Treatment Position  in bed  -RB      Post Treatment Position  bed  -RB      In Bed  supine;call light within reach;encouraged to call for assist;side rails up x3  -RB      Recorded by [RB] Jack Moyer, PTA 08/07/19 1431      Row Name 08/07/19 1429             Pain Scale: FACES Pre/Post-Treatment    Pain: FACES Scale, Pretreatment  0-->no hurt  -RB      Pain: FACES Scale, Post-Treatment  0-->no hurt  -RB       Recorded by [RB] Jack Moyer, PTA 08/07/19 1431      Row Name 08/07/19 1429             Sensory Assessment/Intervention    Sensory General Assessment  no sensation deficits identified  -RB      Recorded by [RB] Jack Moyer, PTA 08/07/19 1431      Row Name 08/07/19 1429             Vision Assessment/Intervention    Visual Impairment/Limitations  WFL  -RB      Recorded by [RB] Jack Moyer, PTA 08/07/19 1431      Row Name 08/07/19 1429             Coping    Observed Emotional State  calm;cooperative  -RB      Verbalized Emotional State  acceptance  -RB      Recorded by [RB] Jack Moyer, PTA 08/07/19 1431      Row Name 08/07/19 1429             Outcome Summary/Treatment Plan (PT)    Anticipated Equipment Needs at Discharge (PT)  -- TBD  -RB      Anticipated Discharge Disposition (PT)  home with assist;home with home health  -RB      Recorded by [RB] Jack Moyer, [facility] 08/07/19 1431        User Key  (r) = Recorded By, (t) = Taken By, (c) = Cosigned By    Initials Name Effective Dates Discipline    RB Comfort Jack, PTA 03/29/19 -  PT                   Physical Therapy Education     Title: PT OT SLP Therapies (Done)     Topic: Physical Therapy (Done)     Point: Mobility training (Done)     Learning Progress Summary           Patient Acceptance, E, VU,NR by RB at 8/7/2019  2:31 PM    Acceptance, E, VU,NR by RR at 8/7/2019 12:59 AM                   Point: Home exercise program (Done)     Learning Progress Summary           Patient Acceptance, E, VU,NR by RB at 8/7/2019  2:31 PM    Acceptance, E, VU,NR by RR at 8/7/2019 12:59 AM                   Point: Body mechanics (Done)     Learning Progress Summary           Patient Acceptance, E, VU,NR by RB at 8/7/2019  2:31 PM    Acceptance, E, VU,NR by RR at 8/7/2019 12:59 AM                   Point: Precautions (Done)     Learning Progress Summary           Patient Acceptance, E, VU,NR by RB at 8/7/2019  2:31 PM    Acceptance, E, VU,NR by RR at 8/7/2019  12:59 AM                               User Key     Initials Effective Dates Name Provider Type Discipline    RR 11/02/16 -  Janene Esquivel RN Registered Nurse Nurse    RB 03/29/19 -  Jack Moyer PTA Physical Therapy Assistant PT                PT Recommendation and Plan  Anticipated Discharge Disposition (PT): home with assist, home with home health  Therapy Frequency (PT Clinical Impression): other (see comments)(3-5 times/ week per priority policy)  Outcome Summary/Treatment Plan (PT)  Anticipated Equipment Needs at Discharge (PT): (TBD)  Anticipated Discharge Disposition (PT): home with assist, home with home health  Plan of Care Reviewed With: patient  Progress: no change  Outcome Summary: Pt supine in bed and states that she has already walked today and is tired from being moved from 2nd to 3rd floor.  Pt educated on the importance of participating with therapy and states that she will resume tomorrow.  Continue per POC.  Outcome Measures     Row Name 08/06/19 1607 08/06/19 1600 08/06/19 1500       How much help from another person do you currently need...    Turning from your back to your side while in flat bed without using bedrails?  --  --  4  -AG    Moving from lying on back to sitting on the side of a flat bed without bedrails?  --  --  4  -AG    Moving to and from a bed to a chair (including a wheelchair)?  --  --  3  -AG    Standing up from a chair using your arms (e.g., wheelchair, bedside chair)?  --  --  3  -AG    Climbing 3-5 steps with a railing?  --  --  2  -AG    To walk in hospital room?  --  --  3  -AG    AM-PAC 6 Clicks Score (PT)  --  --  19  -AG       How much help from another is currently needed...    Putting on and taking off regular lower body clothing?  3  -KR  --  --    Bathing (including washing, rinsing, and drying)  3  -KR  --  --    Toileting (which includes using toilet bed pan or urinal)  3  -KR  --  --    Putting on and taking off regular upper body clothing  3  -KR   --  --    Taking care of personal grooming (such as brushing teeth)  3  -KR  --  --    Eating meals  3  -KR  --  --    AM-PAC 6 Clicks Score (OT)  18  -KR  --  --       Functional Assessment    Outcome Measure Options  --  AM-PAC 6 Clicks Daily Activity (OT)  -KR  AM-PAC 6 Clicks Basic Mobility (PT)  -AG      User Key  (r) = Recorded By, (t) = Taken By, (c) = Cosigned By    Initials Name Provider Type    Anu Solorzano, PT Physical Therapist    KR Helder Cam, OT Occupational Therapist         Time Calculation:   PT Charges     Row Name 08/07/19 1431             Time Calculation    PT Received On  08/07/19  -RB      PT - Next Appointment  08/08/19  -RB      PT Goal Re-Cert Due Date  08/20/19  -         Timed Charges    50471 - PT Therapeutic Activity Minutes  15  -RB        User Key  (r) = Recorded By, (t) = Taken By, (c) = Cosigned By    Initials Name Provider Type    RB Jack Moyer PTA Physical Therapy Assistant        Therapy Charges for Today     Code Description Service Date Service Provider Modifiers Qty    [id number]  PT THERAPEUTIC ACT EA 15 MIN 8/7/2019 Jack Moyer PTA GP 1          PT G-Codes  Outcome Measure Options: AM-PAC 6 Clicks Daily Activity (OT)  AM-PAC 6 Clicks Score (PT): 19  AM-PAC 6 Clicks Score (OT): 18    Jack Moyer PTA  8/7/2019

## 2019-08-07 NOTE — PROGRESS NOTES
Discharge Planning Assessment  [facility]     Patient Name: Reny Elena  MRN: [id number]  Today's Date: 8/7/2019    Admit Date: 8/5/2019      Discharge Plan     Row Name 08/07/19 1457       Plan    Plan  SS spoke wit pt on this day. Pt lives at home with her live in [facility], and [facility] receives [facility] ([facility]). Pt has a wheelchair, standard walker, rolling walker, bedside commode, standard cane, quad cane, and shower chair. Pt plans to return home at discharge, with transportation provided by a family member. SS will follow and assist as needed.     Patient/Family in Agreement with Plan  yes          CAITY Rosen

## 2019-08-08 VITALS
WEIGHT: 120.9 LBS | RESPIRATION RATE: 18 BRPM | SYSTOLIC BLOOD PRESSURE: 148 MMHG | BODY MASS INDEX: 20.14 KG/M2 | OXYGEN SATURATION: 98 % | HEART RATE: 71 BPM | TEMPERATURE: 98.1 F | DIASTOLIC BLOOD PRESSURE: 73 MMHG | HEIGHT: 65 IN

## 2019-08-08 PROBLEM — T68.XXXA HYPOTHERMIA: Status: RESOLVED | Noted: 2019-08-05 | Resolved: 2019-08-08

## 2019-08-08 LAB
ANION GAP SERPL CALCULATED.3IONS-SCNC: 12.1 MMOL/L (ref 5–15)
BASOPHILS # BLD AUTO: 0.02 10*3/MM3 (ref 0–0.2)
BASOPHILS NFR BLD AUTO: 0.5 % (ref 0–1.5)
BUN BLD-MCNC: 29 MG/DL (ref 8–23)
BUN/CREAT SERPL: 25 (ref 7–25)
CALCIUM SPEC-SCNC: 7.9 MG/DL (ref 8.6–10.5)
CHLORIDE SERPL-SCNC: 107 MMOL/L (ref 98–107)
CO2 SERPL-SCNC: 22.9 MMOL/L (ref 22–29)
CREAT BLD-MCNC: 1.16 MG/DL (ref 0.57–1)
DEPRECATED RDW RBC AUTO: 51.4 FL (ref 37–54)
EOSINOPHIL # BLD AUTO: 0.13 10*3/MM3 (ref 0–0.4)
EOSINOPHIL NFR BLD AUTO: 3.4 % (ref 0.3–6.2)
ERYTHROCYTE [DISTWIDTH] IN BLOOD BY AUTOMATED COUNT: 15.6 % (ref 12.3–15.4)
GFR SERPL CREATININE-BSD FRML MDRD: 47 ML/MIN/1.73
GLUCOSE BLD-MCNC: 326 MG/DL (ref 65–99)
GLUCOSE BLDC GLUCOMTR-MCNC: 188 MG/DL (ref 70–130)
GLUCOSE BLDC GLUCOMTR-MCNC: 236 MG/DL (ref 70–130)
HCT VFR BLD AUTO: 28.6 % (ref 34–46.6)
HGB BLD-MCNC: 8.8 G/DL (ref 12–15.9)
IMM GRANULOCYTES # BLD AUTO: 0.02 10*3/MM3 (ref 0–0.05)
IMM GRANULOCYTES NFR BLD AUTO: 0.5 % (ref 0–0.5)
LEVETIRACETAM SERPL-MCNC: 22.4 UG/ML (ref 10–40)
LYMPHOCYTES # BLD AUTO: 1.37 10*3/MM3 (ref 0.7–3.1)
LYMPHOCYTES NFR BLD AUTO: 36.1 % (ref 19.6–45.3)
MCH RBC QN AUTO: 29.3 PG (ref 26.6–33)
MCHC RBC AUTO-ENTMCNC: 30.8 G/DL (ref 31.5–35.7)
MCV RBC AUTO: 95.3 FL (ref 79–97)
MONOCYTES # BLD AUTO: 0.46 10*3/MM3 (ref 0.1–0.9)
MONOCYTES NFR BLD AUTO: 12.1 % (ref 5–12)
NEUTROPHILS # BLD AUTO: 1.79 10*3/MM3 (ref 1.7–7)
NEUTROPHILS NFR BLD AUTO: 47.4 % (ref 42.7–76)
PLATELET # BLD AUTO: 123 10*3/MM3 (ref 140–450)
PMV BLD AUTO: 11 FL (ref 6–12)
POTASSIUM BLD-SCNC: 4.4 MMOL/L (ref 3.5–5.2)
RBC # BLD AUTO: 3 10*6/MM3 (ref 3.77–5.28)
SODIUM BLD-SCNC: 142 MMOL/L (ref 136–145)
WBC NRBC COR # BLD: 3.79 10*3/MM3 (ref 3.4–10.8)

## 2019-08-08 PROCEDURE — 80048 BASIC METABOLIC PNL TOTAL CA: CPT | Performed by: HOSPITALIST

## 2019-08-08 PROCEDURE — 99239 HOSP IP/OBS DSCHRG MGMT >30: CPT | Performed by: HOSPITALIST

## 2019-08-08 PROCEDURE — 25010000002 HEPARIN (PORCINE) PER 1000 UNITS: Performed by: HOSPITALIST

## 2019-08-08 PROCEDURE — 85025 COMPLETE CBC W/AUTO DIFF WBC: CPT | Performed by: HOSPITALIST

## 2019-08-08 PROCEDURE — 63710000001 INSULIN ASPART PER 5 UNITS: Performed by: HOSPITALIST

## 2019-08-08 PROCEDURE — 63710000001 INSULIN DETEMIR PER 5 UNITS: Performed by: HOSPITALIST

## 2019-08-08 PROCEDURE — 82962 GLUCOSE BLOOD TEST: CPT

## 2019-08-08 RX ORDER — GABAPENTIN 600 MG/1
600 TABLET ORAL 2 TIMES DAILY
Qty: 14 TABLET | Refills: 0 | OUTPATIENT
Start: 2019-08-08 | End: 2021-11-09

## 2019-08-08 RX ORDER — LEVOTHYROXINE SODIUM 0.03 MG/1
25 TABLET ORAL DAILY
Qty: 30 TABLET | Refills: 0 | Status: SHIPPED | OUTPATIENT
Start: 2019-08-08 | End: 2019-09-07

## 2019-08-08 RX ORDER — CARVEDILOL 12.5 MG/1
12.5 TABLET ORAL 2 TIMES DAILY WITH MEALS
Qty: 60 TABLET | Refills: 0 | OUTPATIENT
Start: 2019-08-08 | End: 2022-05-19

## 2019-08-08 RX ORDER — INSULIN GLARGINE 100 [IU]/ML
12 INJECTION, SOLUTION SUBCUTANEOUS DAILY
Qty: 1 ML | Refills: 12 | Status: ON HOLD
Start: 2019-08-09 | End: 2021-11-05

## 2019-08-08 RX ADMIN — ESCITALOPRAM 10 MG: 10 TABLET, FILM COATED ORAL at 08:04

## 2019-08-08 RX ADMIN — CLOPIDOGREL 75 MG: 75 TABLET, FILM COATED ORAL at 08:03

## 2019-08-08 RX ADMIN — HEPARIN SODIUM 5000 UNITS: 5000 INJECTION INTRAVENOUS; SUBCUTANEOUS at 08:04

## 2019-08-08 RX ADMIN — PANTOPRAZOLE SODIUM 40 MG: 40 TABLET, DELAYED RELEASE ORAL at 04:53

## 2019-08-08 RX ADMIN — GABAPENTIN 200 MG: 100 CAPSULE ORAL at 08:04

## 2019-08-08 RX ADMIN — CARVEDILOL 12.5 MG: 6.25 TABLET, FILM COATED ORAL at 08:03

## 2019-08-08 RX ADMIN — CEPHALEXIN 500 MG: 500 CAPSULE ORAL at 08:03

## 2019-08-08 RX ADMIN — LEVOTHYROXINE SODIUM 25 MCG: 25 TABLET ORAL at 04:53

## 2019-08-08 RX ADMIN — INSULIN ASPART 3 UNITS: 100 INJECTION, SOLUTION INTRAVENOUS; SUBCUTANEOUS at 08:03

## 2019-08-08 RX ADMIN — ROPINIROLE HYDROCHLORIDE 0.5 MG: 0.25 TABLET, FILM COATED ORAL at 08:03

## 2019-08-08 RX ADMIN — LEVETIRACETAM 750 MG: 500 TABLET, FILM COATED ORAL at 08:04

## 2019-08-08 RX ADMIN — HYDROCODONE BITARTRATE AND ACETAMINOPHEN 1 TABLET: 5; 325 TABLET ORAL at 11:24

## 2019-08-08 RX ADMIN — INSULIN DETEMIR 10 UNITS: 100 INJECTION, SOLUTION SUBCUTANEOUS at 08:03

## 2019-08-08 RX ADMIN — BUSPIRONE HYDROCHLORIDE 10 MG: 10 TABLET ORAL at 00:59

## 2019-08-08 RX ADMIN — INSULIN ASPART 2 UNITS: 100 INJECTION, SOLUTION INTRAVENOUS; SUBCUTANEOUS at 11:24

## 2019-08-08 RX ADMIN — LISINOPRIL 40 MG: 10 TABLET ORAL at 08:03

## 2019-08-08 RX ADMIN — HYDROCODONE BITARTRATE AND ACETAMINOPHEN 1 TABLET: 10; 325 TABLET ORAL at 08:11

## 2019-08-08 RX ADMIN — BUSPIRONE HYDROCHLORIDE 10 MG: 10 TABLET ORAL at 08:03

## 2019-08-08 RX ADMIN — Medication 1 CAPSULE: at 08:03

## 2019-08-08 NOTE — DISCHARGE PLACEMENT REQUEST
"Reny Merino (59 y.o. Female)     Date of Birth Social Security Number Address Home Phone MRN    1960  [address] [place] [id number] [phone] [id number]    Evangelical Marital Status          Mandaeism        Admission Date Admission Type Admitting Provider Attending Provider Department, Room/Bed    8/5/19 Emergency Erwin Tatum MD Srinivas, Priyanka, MD [address], 3309/1S    Discharge Date Discharge Disposition Discharge Destination         Home or Self Care              Attending Provider:  Samanta Ibarra MD    Allergies:  Penicillins, Codeine, Sulfa Antibiotics    Isolation:  None   Infection:  None   Code Status:  CPR    Ht:  165.1 cm (65\")   Wt:  54.8 kg (120 lb 14.4 oz)    Admission Cmt:  None   Principal Problem:  None                Active Insurance as of 8/5/2019     Primary Coverage     Payor Plan Insurance Group Employer/Plan Group    [facility] MEDICAID      Payor Plan Address Payor Plan Phone Number Payor Plan Fax Number Effective Dates    [address] [address] [phone]  4/12/2017 - None Entered    [facility] [id number]       Subscriber Name Subscriber Birth Date Member ID       RENY MERINO 1960 [id number]                 Emergency Contacts      (Rel.) Home Phone Work Phone Mobile Phone    Liza Oliva (Daughter) [phone] --    Cliff Leiva (Significant Other) [phone] -- --    Sarah Merino (Other) -- -- [phone]            Emergency Contact Information     Name Relation Home Work Mobile    Liza Oliva Daughter [phone]     Cliff Leiva Significant Other [phone]      Sarah Merino Other   [phone]          Insurance Information                [facility] MEDICAID Phone: [phone]    Subscriber: Reny Merino Subscriber#: [id number]    Group#:  Precert#:           Treatment Team     Provider Relationship Specialty Contact    Samanta Ibarra" MD Attending, Physician of Record Hospitalist [phone]    Jack Moyer PTA Physical Therapy Assistant Physical Therapy     Erwin Tatum MD Consulting Physician Hospitalist [phone]    Brandon Almazan Patient Care Technician -- [phone]    Massiel Snyder, RRT Respiratory Therapist -- [phone]    Funmilayo Nevarez, RN Registered Nurse -- [phone]          Problem List           Codes Noted - Resolved       Non-Hospital    HTN (hypertension) ICD-10-CM: I10  ICD-9-CM: 401.9 7/24/2019 - Present    CVA (cerebral vascular accident) (CMS/[facility]) ICD-10-CM: I63.9  ICD-9-CM: 434.91 7/24/2019 - Present    Chronic diastolic CHF (congestive heart failure) (CMS/[facility]) ICD-10-CM: I50.32  ICD-9-CM: 428.32, 428.0 7/24/2019 - Present    Decubitus ulcer of coccyx, unspecified pressure ulcer stage ICD-10-CM: L89.159  ICD-9-CM: 707.03, 707.20 7/24/2019 - Present    Depression ICD-10-CM: F32.9  ICD-9-CM: 311 7/24/2019 - Present    Hyperkalemia ICD-10-CM: E87.5  ICD-9-CM: 276.7 7/24/2019 - Present    Expressive aphasia ICD-10-CM: R47.01  ICD-9-CM: 784.3 7/24/2019 - Present    Impaired mobility and ADLs ICD-10-CM: Z74.09  ICD-9-CM: 799.89 7/24/2019 - Present    Elevated troponin ICD-10-CM: R74.8  ICD-9-CM: 790.6 4/17/2019 - Present    History of non-ST elevation myocardial infarction (NSTEMI) ICD-10-CM: I25.2  ICD-9-CM: 412 3/25/2019 - Present    CKD (chronic kidney disease) stage 3, GFR 30-59 ml/min (CMS/[facility]) ICD-10-CM: N18.3  ICD-9-CM: 585.3 3/25/2019 - Present    Cellulitis ICD-10-CM: L03.90  ICD-9-CM: 682.9 3/20/2019 - Present    Metabolic encephalopathy ICD-10-CM: G93.41  ICD-9-CM: 348.31 3/20/2019 - Present    Confusion ICD-10-CM: R41.0  ICD-9-CM: 298.9 3/16/2019 - Present    Hyperglycemia ICD-10-CM: R73.9  ICD-9-CM: 790.29 3/15/2019 - Present    Wound of right ankle ICD-10-CM: S91.001A  ICD-9-CM: 891.0 2/4/2019 - Present    Iron deficiency anemia ICD-10-CM: D50.9  ICD-9-CM: 280.9 12/30/2018 - Present     Type 2 diabetes mellitus with hyperglycemia, with long-term current use of insulin (CMS/[facility]) ICD-10-CM: E11.65, Z79.4  ICD-9-CM: 250.00, 790.29, V58.67 2018 - Present    Tibia/fibula fracture ICD-10-CM: S82.209A, S82.409A  ICD-9-CM: 823.82 2018 - Present    Severe sepsis (CMS/HCC) ICD-10-CM: A41.9, R65.20  ICD-9-CM: 038.9, 995.92 2018 - Present             History & Physical      Erwin Tatum MD at 2019  2:38 AM          Hospitalist History and Physical        Patient Identification  Name: Reny Elena  Age/Sex: 59 y.o. female  :  1960        MRN: [id number]  Visit Number: [id number]  PCP: Yandel Paulson MD      Chief complaint freezing cold, low body temperature    History of Present Illness:  Patient is a 59 y.o. female with history of type II DM, diastolic dysfunction, HTN, PAD, and chronic osteomyelitis of the right lower extremity. She recently was hospitalized at [facility] from - for expressive aphasia initially suspected to be secondary to CVA. It was later suspected that her symptoms were due to nonconvulsive seizures based on EEG findings, and that the seizures were possibly related to cefepime which she had been receiving for chronic osteomyelitis. She was started on depakote and keppra. Cefepime was changed by ID to rocephin and later to keflex at time of discharge. She reports that while in the hospital she had two episodes of hypothermia, which her physicians could not explain.     She had been doing relatively well since discharge until the morning of , when she woke up feeling extremely cold. She checked her temperature and it was only 90 F. Her  tried to find her glucometer to check her glucose level but could not find it. She ate some jello and came on to the hospital. She states most of the time in the ED she was in a fog and cannot remember initial details. She was noted to be sleeping most of her ED stay earlier on but  later was awake, alert and oriented. Per ED note, her temp was 92.2 rectal on arrival. Temperature improved with heating blanket. Glucose was 125. Creatinine was up slightly from baseline and BUN:cr ratio was 27. WBC was normal while CRP was up slightly at 2.94. Valproic acid level was normal at 99. UA showed 2+ bacteria, 3-5 WBC but was negative for nitrite or leuk esterase and patient denies any urinary symptoms. CXR and CT chest were unremarkable, while CT abdomen showed nonspecific LUQ small bowel wall thickening that could represent enteritis or other focal inflammatory process. Patient reportedly had some LUQ tenderness on exam in the ED, though she denies any pain in the PCU and is non-tender on my exam now. Cortisol level is 17. Upon further questioning, patient recalls that at one time she may have been on fludricortisone b/c of low cortisol levels, but her cardiologist Dr Helton ultimately discontinued this medication. She has been admitted to the PCU for further workup and management.     Review of Systems  Review of Systems   Constitutional: Positive for chills, fatigue and fever. Negative for activity change, appetite change, diaphoresis and unexpected weight change.   HENT: Negative for congestion, postnasal drip, rhinorrhea, sinus pressure, sinus pain and sore throat.    Eyes: Negative for photophobia, pain, discharge, redness, itching and visual disturbance.   Respiratory: Negative for cough, shortness of breath and wheezing.    Cardiovascular: Negative for chest pain, palpitations and leg swelling.   Gastrointestinal: Negative for abdominal distention, abdominal pain, constipation, diarrhea, nausea and vomiting.   Endocrine: Negative for cold intolerance, heat intolerance, polydipsia, polyphagia and polyuria.   Genitourinary: Negative for difficulty urinating, dysuria, flank pain and hematuria.   Musculoskeletal: Positive for myalgias (right leg pain, chronic from chronic osteomyelitis). Negative for  arthralgias, back pain and joint swelling.   Skin: Positive for wound (right leg surgical wound, appears to be healing well). Negative for color change, pallor and rash.   Allergic/Immunologic: Negative for environmental allergies, food allergies and immunocompromised state.   Neurological: Negative for dizziness, tremors, seizures, syncope, weakness, light-headedness, numbness and headaches.   Hematological: Negative for adenopathy. Does not bruise/bleed easily.   Psychiatric/Behavioral: Negative for agitation, behavioral problems and confusion.       History  Past Medical History:   Diagnosis Date   • Arthritis    • Closed fracture of right fibula and tibia 2018   • Depression    • Diabetic ulcer of left foot associated with type 2 diabetes mellitus (CMS/[facility]) 2017   • Diastolic dysfunction    • Essential hypertension    • Hyperlipidemia    • Iron deficiency anemia 2018   • PAD (peripheral artery disease) (CMS/[facility])    • Type 2 diabetes mellitus with hyperglycemia, with long-term current use of insulin (CMS/[facility]) 2018     Past Surgical History:   Procedure Laterality Date   • BACK SURGERY      Post spinal diskectomy, osteophytectomy in one lumbar interspace   • CATARACT EXTRACTION      Left    •  SECTION     • DENTAL PROCEDURE     • HYSTERECTOMY     • INCISION AND DRAINAGE LEG Left 4/15/2017    Procedure: INCISION AND DRAINAGE LOWER EXTREMITY;  Surgeon: Basilio Morris MD;  Location: [facility] OR;  Service:    • INCISION AND DRAINAGE LEG Left 2017    Procedure: Irrigation and Debridment abcess diabetic wound left foot with deep culture;  Surgeon: Basilio Morris MD;  Location: [facility] OR;  Service:    • KNEE ARTHROSCOPY Right    • ORIF TIBIA FRACTURE Right 2018    Procedure: TIBIAL  FRACTURE OPEN REDUCTION INTERNAL FIXATION;  Surgeon: Jung Barragan MD;  Location: [facility] OR;  Service: Orthopedics   • TOE AMPUTATION Right     5th   • TUBAL ABDOMINAL LIGATION       Family History    Problem Relation Age of Onset   • Heart disease Mother    • Cancer Mother    • COPD Mother    • Diabetes Other    • Depression Other      Social History     Tobacco Use   • Smoking status: Never Smoker   • Smokeless tobacco: Never Used   Substance Use Topics   • Alcohol use: No     Frequency: Never   • Drug use: No     Medications Prior to Admission   Medication Sig Dispense Refill Last Dose   • atorvastatin (LIPITOR) 40 MG tablet Take 1 tablet by mouth Daily. 30 tablet 0 Taking   • busPIRone (BUSPAR) 10 MG tablet Take 1 tablet by mouth Every 8 (Eight) Hours.   Taking   • Cephalexin 500 MG tablet Take 500 mg by mouth Daily. 30 tablet 0    • CloNIDine (CATAPRES) 0.1 MG tablet Take 0.1 mg by mouth Every 6 (Six) Hours As Needed for High Blood Pressure (greater than 160/90).   Not Taking   • clopidogrel (PLAVIX) 75 MG tablet Take 1 tablet by mouth Daily. 30 tablet 4 Taking   • divalproex (DEPAKOTE) 500 MG DR tablet Take 2 tablets by mouth Every 12 (Twelve) Hours. 120 tablet 0    • FLUoxetine (PROzac) 20 MG capsule Take 20 mg by mouth.   Taking   • furosemide (LASIX) 20 MG tablet Take 1 tablet by mouth Daily As Needed (Increased shortness of breath or swelling.). 30 tablet 0 Taking   • gabapentin (NEURONTIN) 800 MG tablet Take 1 tablet by mouth Every 12 (Twelve) Hours.      • hydrALAZINE (APRESOLINE) 25 MG tablet Take 1 tablet by mouth Every 8 (Eight) Hours. 90 tablet 0 Taking   • HYDROcodone-acetaminophen (NORCO)  MG per tablet Take 1 tablet by mouth Every 4 (Four) Hours As Needed for Mild Pain . Pharmacy directions states every 4 to 6 hours prn pain.   Taking   • hydrOXYzine (ATARAX) 25 MG tablet Take 25 mg by mouth Daily As Needed for Anxiety.   Taking   • insulin aspart (novoLOG) 100 UNIT/ML injection Sliding scale TID with meals. Glucose 150-199:2 units. 240-249: 3 units. 250-299- 4 units. 300-349- 5 units. 350-400 :6 units. Over 400: 7un (Patient taking differently: Inject 0-6 Units under the skin into the  appropriate area as directed 3 (Three) Times a Day Before Meals. Sliding scale TID with meals. Glucose 150-199:2 units. 240-249: 3 units. 250-299- 4 units. 300-349- 5 units. 350-400 :6 units. Over 400: 7un)  12 Taking   • Insulin Glargine (BASAGLAR KWIKPEN) 100 UNIT/ML injection pen Inject 25 Units under the skin into the appropriate area as directed 2 (Two) Times a Day. 1 mL 12 Taking   • levETIRAcetam (KEPPRA) 750 MG tablet Take 1 tablet by mouth Every 12 (Twelve) Hours. 60 tablet 0    • lisinopril (PRINIVIL,ZESTRIL) 40 MG tablet Take 1 tablet by mouth Daily. 30 tablet 0    • metoprolol succinate XL (TOPROL-XL) 50 MG 24 hr tablet Take 1 tablet by mouth Daily. 30 tablet 5 Taking   • multivitamin (DAILY FRAN) tablet tablet Take 1 tablet by mouth Daily. 30 tablet 0 Taking   • pantoprazole (PROTONIX) 40 MG EC tablet Take 40 mg by mouth Daily.   Taking   • potassium chloride (K-DUR) 10 MEQ CR tablet Take 1 tablet by mouth Daily As Needed (Only take on days you take Lasix.). 30 tablet 0 Taking   • rOPINIRole (REQUIP) 0.5 MG tablet Take 0.5 mg by mouth 2 (Two) Times a Day. Take 1 hour before bedtime.   Not Taking   • temazepam (RESTORIL) 30 MG capsule Take 30 mg by mouth Every Night.   Taking   • tiZANidine (ZANAFLEX) 4 MG tablet Take 4 mg by mouth Every 6 (Six) Hours As Needed for Muscle Spasms.   Taking   • vitamin D (ERGOCALCIFEROL) 09642 units capsule capsule Take 50,000 Units by mouth 1 (One) Time Per Week. Prior to [facility] Admission, Patient was on: takes on wednesdays   Not Taking     Allergies:  Penicillins; Codeine; and Sulfa antibiotics    Objective     Vital Signs  Temp:  [92.2 °F (33.4 °C)-98.2 °F (36.8 °C)] 98 °F (36.7 °C)  Heart Rate:  [51-64] 64  Resp:  [16-20] 18  BP: ()/(49-99) 143/64  Body mass index is 20.45 kg/m².    Physical Exam:  Physical Exam   Constitutional: She is oriented to person, place, and time.   Chronically ill appearing female, pleasant, no acute distress   HENT:   Head:  Normocephalic and atraumatic.   Mouth/Throat: Oropharynx is clear and moist.   Eyes: Conjunctivae and EOM are normal. Pupils are equal, round, and reactive to light.   Neck: Normal range of motion. Neck supple. No JVD present.   Cardiovascular: Normal rate, regular rhythm, normal heart sounds and intact distal pulses.   No murmur heard.  Pulmonary/Chest: Effort normal and breath sounds normal. No respiratory distress. She has no wheezes. She has no rales. She exhibits no tenderness.   Abdominal: Soft. Bowel sounds are normal. She exhibits no distension. There is no tenderness.   Musculoskeletal: Normal range of motion. She exhibits deformity (right leg with protective splint and wrapped in ACE bandage; removal of bandage reveals surgical incisions of various ages which appear to be healing fairly well). She exhibits no edema.   Lymphadenopathy:     She has no cervical adenopathy.   Neurological: She is alert and oriented to person, place, and time.   No gross focal deficits appreciated   Skin: Skin is warm and dry. Capillary refill takes less than 2 seconds. No rash noted. No erythema. No pallor.   See surgical wounds above   Psychiatric: She has a normal mood and affect. Her behavior is normal. Judgment and thought content normal.         Results Review:       Lab Results:  Results from last 7 days   Lab Units 08/05/19  1144 07/30/19  0459   WBC 10*3/mm3 5.51 6.73   HEMOGLOBIN g/dL 13.2 9.8*   PLATELETS 10*3/mm3 160 140     Results from last 7 days   Lab Units 08/05/19  1144 07/30/19  0539   CRP mg/dL 2.94* 0.15     Results from last 7 days   Lab Units 08/05/19  1144 07/30/19  0459   SODIUM mmol/L 139 143   POTASSIUM mmol/L 3.8 3.6   CHLORIDE mmol/L 98 107   CO2 mmol/L 25.3 24.0   BUN mg/dL 33* 29*   CREATININE mg/dL 1.19* 1.04*   CALCIUM mg/dL 8.9 8.1*   GLUCOSE mg/dL 178* 84     Results from last 7 days   Lab Units 08/05/19  1144 07/30/19  0459   MAGNESIUM mg/dL 2.0 1.9     No results found for: HGBA1C  Results  from last 7 days   Lab Units 08/05/19  1144 07/30/19  0459   BILIRUBIN mg/dL 0.3 <0.2*   ALK PHOS U/L 134* 107   AST (SGOT) U/L 17 10   ALT (SGPT) U/L 14 7     Results from last 7 days   Lab Units 08/05/19  1144 07/30/19  2116   TROPONIN T ng/mL <0.010 <0.010                   I have reviewed the patient's laboratory results.    Imaging:  Imaging Results (last 72 hours)     Procedure Component Value Units Date/Time    CT Abdomen Pelvis Without Contrast [[id number]] Collected:  08/05/19 1747     Updated:  08/05/19 1822    Narrative:          CT ABDOMEN PELVIS WO CONTRAST-      TECHNIQUE: Multiple axial CT images were obtained from lung bases  through pubic symphysis without administration of IV contrast.  Reformatted images in the coronal and/or sagittal plane(s) were  generated from the axial data set to facilitate diagnostic accuracy  and/or surgical planning.  Oral Contrast:NONE.     Radiation dose reduction techniques were utilized per ALARA protocol.  Automated exposure control was initiated through either or Chroma or  DoseRight software packages by  protocol.             Clinical information  Sepsis      Comparison  NONE.     Findings  LOWER THORAX: Clear. No effusions.     ABDOMEN:        LIVER: Homogeneous. No focal hepatic mass or ductal dilatation.        GALLBLADDER: No dilation or stone identified.        PANCREAS: Unremarkable. No mass or ductal dilatation.        SPLEEN: Homogeneous. No splenomegaly.        ADRENALS: No mass.        KIDNEYS: No mass. No obstructive uropathy.  No evidence of  urolithiasis.        GI TRACT: NONSPECIFIC LEFT UPPER QUADRANT SMALL BOWEL WALL THICKENING  THAT COULD REPRESENT ENTERITIS OR OTHER FOCAL INFLAMMATORY PROCESS.        PERITONEUM: No free air. No free fluid or loculated fluid  collections.        MESENTERY: Unremarkable.        LYMPH NODES: No lymphadenopathy.        VASCULATURE: No evidence of aneurysm.        ABDOMINAL WALL: No focal hernia or mass.            OTHER: None.     PELVIS:        BLADDER: No focal mass or significant wall thickening        REPRODUCTIVE: Unremarkable as visualized.        APPENDIX: Nondistended. No surrounding inflammation.     BONES: No acute bony abnormality.       Impression:       Impression:  Nonspecific left upper quadrant small bowel wall thickening that could  represent enteritis or other focal inflammatory process.     This report was finalized on 8/5/2019 6:19 PM by Dr. Harrison Biswas MD.       CT Chest Without Contrast [[id number]] Collected:  08/05/19 1746     Updated:  08/05/19 1820    Narrative:       CT CHEST WO CONTRAST-      TECHNIQUE: Multiple axial CT images were obtained from lung apex through  upper abdomen without administration of IV contrast. Reformatted images  in the coronal and/or sagittal plane(s) were generated from the axial  data set to facilitate diagnostic accuracy and/or surgical planning.  Oral Contrast:NONE.     Radiation dose reduction techniques were utilized per ALARA protocol.  Automated exposure control was initiated through either or Newforma or  DoseRight software packages by  protocol.             Clinical information  Sepsis      Comparison  NONE.     Findings  LUNGS: Unremarkable. No parenchymal soft tissue nodules.  No focal air  space disease.     HEART: Unremarkable.     PERICARDIUM: No effusion.     MEDIASTINUM: No masses. No enlarged lymph nodes.  No fluid collections.     PLEURA: No pleural effusion. No pleural mass or abnormal calcification.     MAJOR AIRWAYS: Clear. No intrinsic mass.     VASCULATURE: No evidence of aneurysm.     VISUALIZED UPPER ABDOMEN:        LIVER: Homogeneous. No focal hepatic mass or ductal dilatation.        SPLEEN: Homogeneous. No splenomegaly.        ADRENALS: No mass.        KIDNEYS: No mass. No obstructive uropathy.  No evidence of  urolithiasis.        GI TRACT: Non-dilated. No definite wall thickening.        PERITONEUM: No free air. No free  fluid or loculated fluid  collections.           ABDOMINAL WALL: No focal hernia or mass.           OTHER: None.     BONES: No acute bony abnormality.       Impression:       Impression:  1. Unremarkable exam.  No source for the patient symptoms.  2. Other incidental/non-acute findings as above.     This report was finalized on 8/5/2019 5:46 PM by Dr. Harrison Biswas MD.       XR Chest 1 View [[id number]] Collected:  08/05/19 1241     Updated:  08/05/19 1243    Narrative:       EXAMINATION: XR CHEST 1 VW-      CLINICAL INDICATION:     hypothermia     TECHNIQUE:  XR CHEST 1 VW-      COMPARISON: NONE      FINDINGS:   The lungs remain aerated.  Heart and mediastinum contours are unremarkable.  No pleural effusion.  No pneumothorax.   Bony and soft tissue structures are unremarkable.       Impression:       No radiographic evidence of acute cardiac or pulmonary  disease.     This report was finalized on 8/5/2019 12:41 PM by Dr. Harrison Biswas MD.           I have personally reviewed the patient's radiologic imaging.          EKG: Sinus bradycardia, HR 53, QTc 476. No overt ST changes to suggest acute ischemia appreciated.     I have personally reviewed the patient's EKG.          Assessment/Plan     - Hypothermia: suspect could have been precipitated by hypoglycemia though cannot confirm this as patient could not find glucometer at home to check glucose and ate jello before coming to the ED. Underlying infectious process on differential, most likely source felt to be intra-abdominal based on exam findings (in ED) and CT findings. Monitor accuchecks q2h. On empiric IV antibiotics including rocephin and flagyl, continue to monitor CRP and follow up on blood cultures. Random cortisol 17. Repeat at 8 am, if low will perform cosyntropin test. Per patient account, she may have been on fludricortisone in the past which raises suspicion for underlying adrenal insufficiency. Patient does have mildly elevated TSH though higher on  7/24/19 and free T4 normal at that time. Repeat free T4 and T3 pending.   - SIRS criteria with hypothermia and CRP elevation: again, suspect possible intra-abdominal process, see plan above.  - Type II DM, insulin dependent: A1c 9.2% as of 7/2019. Monitor accuchecks q2h to investigate for hypoglycemic episodes as noted above. Hold long-acting insulin for now, but make low dose SSI available as needed.  - Mild acute on stage III CKD: gently hydrate with IV fluids. Avoid nephrotoxic meds (lasix, lisinopril). Renally dose gabapentin. Repeat labs in the AM.  - Newly diagnosed seizure activity, non-convulsive: continue keppra and depakote. Keppra level pending (send-out), depakote level normal.   - PAD: continue home plavix  - Chronic right lower extremity osteomyelitis: on rocephin for now which should suffice (placed on keflex at time of discharge from [facility] earlier this month).   - Hypertension: continue home hydralazine with hold parameters for now. Hold metoprolol due to borderline bradycardia, and hold lisinopril and lasix for reasons explained above.     DVT Prophylaxis: SQ heparin    Estimated Length of Stay >2 midnights    I discussed the patient's findings, assessment and plan with the patient and her RN Claudia who was present during my interview and exam on the PCU.    * patient is high risk due to hypothermia, possible sepsis, mild acute on CKD, PAD, newly diagnosed non-convulsive seizures, chronic osteomyelitis    Erwin Tatum MD  08/06/19  2:38 AM      Electronically signed by Erwin Tatum MD at 8/6/2019  3:28 AM       ICU Vital Signs     Row Name 08/08/19 1054 08/08/19 0811 08/08/19 0713 08/08/19 0300 08/07/19 2292       Height and Weight    Weight  --  --  --  54.8 kg (120 lb 14.4 oz)  --    Weight Method  --  --  --  Bed scale  --       Vitals    Temp  98.1 °F (36.7 °C)  --  97.7 °F (36.5 °C)  98 °F (36.7 °C)  --    Temp src  Oral  --  Oral  Oral  --    Pulse  71  --  69  69  --    Heart  Rate Source  Monitor  --  Monitor  Monitor  --    Resp  18  --  18  18  --    Resp Rate Source  Visual  --  Visual  Visual  --    BP  148/73  --  174/75 RN MARTY AWARE BP  131/62  --    Noninvasive MAP (mmHg)  110  --  113  92  --    BP Location  Left arm  --  Left arm  Left arm  --    BP Method  Automatic  --  Automatic  Automatic  --    Patient Position  Lying  --  Lying  Lying  --       Oxygen Therapy    SpO2  98 %  --  96 %  96 %  --    Pulse Oximetry Type  --  --  --  --  --    Device (Oxygen Therapy)  --  room air  --  --  --    Row Name 08/07/19 1956 08/07/19 1720 08/07/19 1300             Vitals    Temp  98.5 °F (36.9 °C)  --  97.8 °F (36.6 °C)      Temp src  Oral  --  Oral      Pulse  78  71  75      Heart Rate Source  Monitor  --  Monitor      Resp  18  --  18      Resp Rate Source  Visual  --  Visual      BP  129/55  145/74  105/51      Noninvasive MAP (mmHg)  --  --  72      BP Location  --  --  Right arm      BP Method  --  --  Automatic      Patient Position  --  --  Lying         Oxygen Therapy    SpO2  98 %  --  98 %      Device (Oxygen Therapy)  --  --  room air          Lines, Drains & Airways    Active LDAs     Name:   Placement date:   Placement time:   Site:   Days:    Peripheral IV 08/05/19 1135 Right Antecubital   08/05/19 1135    Antecubital   2                [facility] Medications (active)       Dose Frequency Start End    busPIRone (BUSPAR) tablet 10 mg 10 mg Every 8 Hours 8/6/2019     Sig - Route: Take 1 tablet by mouth Every 8 (Eight) Hours. - Oral    carvedilol (COREG) tablet 12.5 mg 12.5 mg 2 Times Daily With Meals 8/6/2019     Sig - Route: Take 2 tablets by mouth 2 (Two) Times a Day With Meals. - Oral    cephalexin (KEFLEX) capsule 500 mg 500 mg Daily 8/7/2019 8/14/2019    Sig - Route: Take 1 capsule by mouth Daily. - Oral    CloNIDine (CATAPRES) tablet 0.1 mg 0.1 mg Every 6 Hours PRN 8/6/2019     Sig - Route: Take 1 tablet by mouth Every 6 (Six) Hours As Needed for High Blood  Pressure (greater than 160/90). - Oral    clopidogrel (PLAVIX) tablet 75 mg 75 mg Daily 8/6/2019     Sig - Route: Take 1 tablet by mouth Daily. - Oral    dextrose (D50W) 25 g/ 50mL Intravenous Solution 25 g 25 g Every 15 Minutes PRN 8/7/2019     Sig - Route: Infuse 50 mL into a venous catheter Every 15 (Fifteen) Minutes As Needed for Low Blood Sugar (Blood Sugar Less Than 70). - Intravenous    dextrose (GLUTOSE) oral gel 15 g 15 g Every 15 Minutes PRN 8/7/2019     Sig - Route: Take 15 application by mouth Every 15 (Fifteen) Minutes As Needed for Low Blood Sugar (Blood sugar less than 70). - Oral    escitalopram (LEXAPRO) tablet 10 mg 10 mg Daily 8/7/2019     Sig - Route: Take 1 tablet by mouth Daily. - Oral    gabapentin (NEURONTIN) capsule 200 mg 200 mg Every 12 Hours Scheduled 8/6/2019     Sig - Route: Take 2 capsules by mouth Every 12 (Twelve) Hours. - Oral    glucagon (human recombinant) (GLUCAGEN DIAGNOSTIC) injection 1 mg 1 mg As Needed 8/7/2019     Sig - Route: Inject 1 mg under the skin into the appropriate area as directed As Needed for Low Blood Sugar (Blood Glucose Less Than 70). - Subcutaneous    heparin (porcine) 5000 UNIT/ML injection 5,000 Units 5,000 Units Every 12 Hours Scheduled 8/6/2019     Sig - Route: Inject 1 mL under the skin into the appropriate area as directed Every 12 (Twelve) Hours. - Subcutaneous    HYDROcodone-acetaminophen (NORCO)  MG per tablet 1 tablet 1 tablet Every 4 Hours PRN 8/6/2019     Sig - Route: Take 1 tablet by mouth Every 4 (Four) Hours As Needed for Mild Pain . - Oral    HYDROcodone-acetaminophen (NORCO) 5-325 MG per tablet 1 tablet 1 tablet Every 6 Hours PRN 8/6/2019 8/16/2019    Sig - Route: Take 1 tablet by mouth Every 6 (Six) Hours As Needed for Severe Pain  (hold for oversedation, SBP<100). - Oral    insulin aspart (novoLOG) injection 0-7 Units 0-7 Units 4 Times Daily Before Meals & Nightly 8/7/2019     Sig - Route: Inject 0-7 Units under the skin into the  "appropriate area as directed 4 (Four) Times a Day Before Meals & at Bedtime. - Subcutaneous    insulin detemir (LEVEMIR) injection 10 Units 10 Units Daily 8/8/2019     Sig - Route: Inject 10 Units under the skin into the appropriate area as directed Daily. - Subcutaneous    lactobacillus acidophilus (RISAQUAD) capsule 1 capsule 1 capsule Daily 8/6/2019     Sig - Route: Take 1 capsule by mouth Daily. - Oral    levETIRAcetam (KEPPRA) tablet 750 mg 750 mg Every 12 Hours Scheduled 8/6/2019     Sig - Route: Take 750 mg by mouth Every 12 (Twelve) Hours. - Oral    levothyroxine (SYNTHROID, LEVOTHROID) tablet 25 mcg 25 mcg Every Early Morning 8/7/2019     Sig - Route: Take 1 tablet by mouth Every Morning. - Oral    lisinopril (PRINIVIL,ZESTRIL) tablet 40 mg 40 mg Every 24 Hours Scheduled 8/6/2019     Sig - Route: Take 4 tablets by mouth Daily. - Oral    pantoprazole (PROTONIX) EC tablet 40 mg 40 mg Every Early Morning 8/6/2019     Sig - Route: Take 1 tablet by mouth Every Morning. - Oral    pravastatin (PRAVACHOL) tablet 20 mg 20 mg Nightly 8/7/2019     Sig - Route: Take 0.5 tablets by mouth Every Night. - Oral    rOPINIRole (REQUIP) tablet 0.5 mg 0.5 mg 2 Times Daily 8/6/2019     Sig - Route: Take 2 tablets by mouth 2 (Two) Times a Day. - Oral    sodium chloride 0.9 % flush 10 mL 10 mL As Needed 8/5/2019     Sig - Route: Infuse 10 mL into a venous catheter As Needed for Line Care. - Intravenous    Cosign for Ordering: Accepted by Amanda Tucker DO on 8/5/2019  5:16 PM    Linked Group 1:  \"And\" Linked Group Details        sodium chloride 0.9 % flush 3 mL 3 mL Every 12 Hours Scheduled 8/6/2019     Sig - Route: Infuse 3 mL into a venous catheter Every 12 (Twelve) Hours. - Intravenous    sodium chloride 0.9 % flush 3-10 mL 3-10 mL As Needed 8/5/2019     Sig - Route: Infuse 3-10 mL into a venous catheter As Needed for Line Care. - Intravenous    hydrALAZINE (APRESOLINE) tablet 25 mg (Discontinued) 25 mg Every 8 Hours " Scheduled 8/6/2019 8/7/2019    Sig - Route: Take 1 tablet by mouth Every 8 (Eight) Hours. - Oral    insulin detemir (LEVEMIR) injection 10 Units (Discontinued) 10 Units Nightly 8/7/2019 8/7/2019    Sig - Route: Inject 10 Units under the skin into the appropriate area as directed Every Night. - Subcutaneous            Lab Results (last 24 hours)     Procedure Component Value Units Date/Time    POC Glucose Once [[id number]]  (Abnormal) Collected:  08/08/19 1103    Specimen:  Blood Updated:  08/08/19 1110     Glucose 188 mg/dL     Levetiracetam Level (Keppra) [[id number]] Collected:  08/05/19 2024    Specimen:  Blood Updated:  08/08/19 0910     Levetiracetam 22.4 ug/mL      Comment: This test was developed and its performance characteristics  determined by [email or web address]. It has not been cleared or approved  by the Food and Drug Administration.       Narrative:       Performed at:  [address]  [id number]  : Carlos Bergman MD, Phone:  [phone]    POC Glucose Once [[id number]]  (Abnormal) Collected:  08/08/19 0751    Specimen:  Blood Updated:  08/08/19 0757     Glucose 236 mg/dL     Basic Metabolic Panel [[id number]]  (Abnormal) Collected:  08/08/19 0051    Specimen:  Blood Updated:  08/08/19 0235     Glucose 326 mg/dL      BUN 29 mg/dL      Creatinine 1.16 mg/dL      Sodium 142 mmol/L      Potassium 4.4 mmol/L      Chloride 107 mmol/L      CO2 22.9 mmol/L      Calcium 7.9 mg/dL      eGFR Non African Amer 47 mL/min/1.73      BUN/Creatinine Ratio 25.0     Anion Gap 12.1 mmol/L     Narrative:       GFR Normal >60  Chronic Kidney Disease <60  Kidney Failure <15    CBC & Differential [[id number]] Collected:  08/08/19 0051    Specimen:  Blood Updated:  08/08/19 0202    Narrative:       The following orders were created for panel order CBC & Differential.  Procedure                               Abnormality         Status                     ---------                                -----------         ------                     CBC Auto Differential[[id number]]        Abnormal            Final result                 Please view results for these tests on the individual orders.    CBC Auto Differential [[id number]]  (Abnormal) Collected:  08/08/19 0051    Specimen:  Blood Updated:  08/08/19 0202     WBC 3.79 10*3/mm3      RBC 3.00 10*6/mm3      Hemoglobin 8.8 g/dL      Hematocrit 28.6 %      MCV 95.3 fL      MCH 29.3 pg      MCHC 30.8 g/dL      RDW 15.6 %      RDW-SD 51.4 fl      MPV 11.0 fL      Platelets 123 10*3/mm3      Neutrophil % 47.4 %      Lymphocyte % 36.1 %      Monocyte % 12.1 %      Eosinophil % 3.4 %      Basophil % 0.5 %      Immature Grans % 0.5 %      Neutrophils, Absolute 1.79 10*3/mm3      Lymphocytes, Absolute 1.37 10*3/mm3      Monocytes, Absolute 0.46 10*3/mm3      Eosinophils, Absolute 0.13 10*3/mm3      Basophils, Absolute 0.02 10*3/mm3      Immature Grans, Absolute 0.02 10*3/mm3     POC Glucose Once [[id number]]  (Abnormal) Collected:  08/07/19 1925    Specimen:  Blood Updated:  08/07/19 1938     Glucose 233 mg/dL     POC Glucose Once [[id number]]  (Normal) Collected:  08/07/19 1046    Specimen:  Blood Updated:  08/07/19 1650     Glucose 122 mg/dL     POC Glucose Once [[id number]]  (Normal) Collected:  08/07/19 1020    Specimen:  Blood Updated:  08/07/19 1650     Glucose 116 mg/dL     POC Glucose Once [[id number]]  (Abnormal) Collected:  08/07/19 1605    Specimen:  Blood Updated:  08/07/19 1628     Glucose 250 mg/dL     Blood Culture - Blood, Wrist, Left [[id number]] Collected:  08/05/19 1212    Specimen:  Blood from Wrist, Left Updated:  08/07/19 1301     Blood Culture No growth at 2 days    Blood Culture - Blood, Arm, Right [[id number]] Collected:  08/05/19 1144    Specimen:  Blood from Arm, Right Updated:  08/07/19 1301     Blood Culture No growth at 2 days        Orders (last 24 hrs)     Start     Ordered    08/09/19 0000  Insulin Glargine (BASAGLAR KWIKPEN) 100 UNIT/ML  injection pen  Daily      08/08/19 1117    08/08/19 1116  Discontinue IV  Once      08/08/19 1117    08/08/19 1111  POC Glucose Once  Once      08/08/19 1103    08/08/19 1110  Discharge patient  Once      08/08/19 1117    08/08/19 0900  insulin detemir (LEVEMIR) injection 10 Units  Daily      08/07/19 1617    08/08/19 0758  POC Glucose Once  Once      08/08/19 0751    08/08/19 0600  Basic Metabolic Panel  Morning Draw      08/07/19 1022    08/08/19 0600  CBC & Differential  Morning Draw      08/07/19 1026    08/08/19 0600  CBC Auto Differential  PROCEDURE ONCE      08/08/19 0002    08/08/19 0000  gabapentin (NEURONTIN) 600 MG tablet  2 Times Daily      08/08/19 1117    08/08/19 0000  carvedilol (COREG) 12.5 MG tablet  2 Times Daily With Meals      08/08/19 1117    08/08/19 0000  levothyroxine (SYNTHROID, LEVOTHROID) 25 MCG tablet  Daily      08/08/19 1117    08/08/19 0000  Discharge Follow-up with PCP      08/08/19 1117    08/08/19 0000  Discharge Follow-up with Specialty: Ortho as scheduled      08/08/19 1117    08/08/19 0000  Diet: Consistent Carbohydrate, Cardiac      08/08/19 1117    08/08/19 0000  Activity as Tolerated      08/08/19 1117    08/08/19 0000  Discharge Instructions     Comments:  Check blood sugar 3 times daily and maintain a log book.   Check blood pressure three times daily and maintain a log book    08/08/19 1117    08/07/19 2100  pravastatin (PRAVACHOL) tablet 20 mg  Nightly      08/06/19 1429    08/07/19 2100  insulin detemir (LEVEMIR) injection 10 Units  Nightly,   Status:  Discontinued      08/07/19 1022    08/07/19 1939  POC Glucose Once  Once      08/07/19 1925    08/07/19 1651  POC Glucose Once  Once      08/07/19 1020    08/07/19 1651  POC Glucose Once  Once      08/07/19 1046    08/07/19 1629  POC Glucose Once  Once      08/07/19 1605    08/07/19 1200  Dietary Nutrition Supplements Boost Plus (Ensure Enlive, Ensure Plus)  Daily With Lunch & Dinner     Comments:  strawberry    08/07/19  1139    08/07/19 1130  insulin aspart (novoLOG) injection 0-7 Units  4 Times Daily Before Meals & Nightly      08/07/19 1022    08/07/19 1100  POC Glucose 4x Daily AC & at Bedtime  4 Times Daily Before Meals & at Bedtime      08/07/19 1022    08/07/19 1022  dextrose (GLUTOSE) oral gel 15 g  Every 15 Minutes PRN      08/07/19 1022    08/07/19 1022  dextrose (D50W) 25 g/ 50mL Intravenous Solution 25 g  Every 15 Minutes PRN      08/07/19 1022    08/07/19 1022  glucagon (human recombinant) (GLUCAGEN DIAGNOSTIC) injection 1 mg  As Needed      08/07/19 1022    08/07/19 0900  escitalopram (LEXAPRO) tablet 10 mg  Daily      08/06/19 1429    08/07/19 0900  cephalexin (KEFLEX) capsule 500 mg  Daily      08/06/19 1828    08/07/19 0600  levothyroxine (SYNTHROID, LEVOTHROID) tablet 25 mcg  Every Early Morning      08/06/19 1801    08/06/19 2200  POC Glucose 4x Daily AC & at Bedtime  4 Times Daily Before Meals & at Bedtime,   Status:  Canceled      08/06/19 1757    08/06/19 1800  carvedilol (COREG) tablet 12.5 mg  2 Times Daily With Meals      08/06/19 1414    08/06/19 1400  lactobacillus acidophilus (RISAQUAD) capsule 1 capsule  Daily      08/06/19 1229    08/06/19 0900  levETIRAcetam (KEPPRA) tablet 750 mg  Every 12 Hours Scheduled      08/06/19 0319    08/06/19 0900  gabapentin (NEURONTIN) capsule 200 mg  Every 12 Hours Scheduled      08/06/19 0319    08/06/19 0900  busPIRone (BUSPAR) tablet 10 mg  Every 8 Hours      08/06/19 0801 08/06/19 0900  clopidogrel (PLAVIX) tablet 75 mg  Daily      08/06/19 0801    08/06/19 0900  lisinopril (PRINIVIL,ZESTRIL) tablet 40 mg  Every 24 Hours Scheduled      08/06/19 0801 08/06/19 0900  pantoprazole (PROTONIX) EC tablet 40 mg  Every Early Morning      08/06/19 0801 08/06/19 0900  rOPINIRole (REQUIP) tablet 0.5 mg  2 Times Daily      08/06/19 0801 08/06/19 0800  HYDROcodone-acetaminophen (NORCO)  MG per tablet 1 tablet  Every 4 Hours PRN      08/06/19 0801 08/06/19 0800   CloNIDine (CATAPRES) tablet 0.1 mg  Every 6 Hours PRN      19 0801    19 0600  hydrALAZINE (APRESOLINE) tablet 25 mg  Every 8 Hours Scheduled,   Status:  Discontinued      19 0320    19 0318  HYDROcodone-acetaminophen (NORCO) 5-325 MG per tablet 1 tablet  Every 6 Hours PRN      19 0319    19 0313  POC Glucose Finger Q2H  Every 2 Hours,   Status:  Canceled      19 0312    19 0000  sodium chloride 0.9 % flush 3 mL  Every 12 Hours Scheduled      19 2304    19 0000  heparin (porcine) 5000 UNIT/ML injection 5,000 Units  Every 12 Hours Scheduled      19 2304    19 2304  sodium chloride 0.9 % flush 3-10 mL  As Needed      19 2304    19 1135  sodium chloride 0.9 % infusion  Continuous,   Status:  Discontinued      19 1133    19 1132  sodium chloride 0.9 % flush 10 mL  As Needed      19 1133    Signed and Held  sodium chloride 0.9 % infusion  Continuous,   Status:  Canceled      Signed and Held    --  insulin lispro (ADMELOG) 100 UNIT/ML injection  3 Times Daily Before Meals      19 1053    --  pravastatin (PRAVACHOL) 20 MG tablet  Daily      19 1053    --  escitalopram (LEXAPRO) 10 MG tablet  Daily      19 1053    --  gabapentin (NEURONTIN) 800 MG tablet  3 Times Daily,   Status:  Discontinued      19 1053          Operative/Procedure Notes (last 24 hours) (Notes from 2019 11:29 AM through 2019 11:29 AM)     No notes of this type exist for this encounter.           Physician Progress Notes (last 24 hours) (Notes from 2019 11:29 AM through 2019 11:29 AM)      Samanta Ibarra MD at 2019  4:07 PM              [facility]     Patient Identification:  Name:  Reny Elena  Age:  59 y.o.  Sex:  female  :  1960  MRN:  [id number]  Visit Number:  [id number]  ROOM: [address] [place] [address]     Primary Care Provider:  Yandel Paulson MD    Length of  stay:  2    Subjective        Chief Compliant follow-up for hypothermia    Patient seen and examined this morning with no family at bedside.  States that she is feeling better.  Denies any chest pain, shortness of breath, nausea vomiting abdominal pain diarrhea. Noted to have symptomatic hypoglycemia this morning with CBG 48.   No further episodes of hypothermia.  On room air.      Objective     Current Hospital Meds:    busPIRone 10 mg Oral Q8H   carvedilol 12.5 mg Oral BID With Meals   cephalexin 500 mg Oral Daily   clopidogrel 75 mg Oral Daily   escitalopram 10 mg Oral Daily   gabapentin 200 mg Oral Q12H   heparin (porcine) 5,000 Units Subcutaneous Q12H   hydrALAZINE 25 mg Oral Q8H   insulin aspart 0-7 Units Subcutaneous 4x Daily AC & at Bedtime   insulin detemir 10 Units Subcutaneous Nightly   lactobacillus acidophilus 1 capsule Oral Daily   levETIRAcetam 750 mg Oral Q12H   levothyroxine 25 mcg Oral Q AM   lisinopril 40 mg Oral Q24H   pantoprazole 40 mg Oral Q AM   pravastatin 20 mg Oral Nightly   rOPINIRole 0.5 mg Oral BID   sodium chloride 3 mL Intravenous Q12H      ----------------------------------------------------------------------------------------------------------------------  Vital Signs:  Temp:  [97.8 °F (36.6 °C)-98.4 °F (36.9 °C)] 97.8 °F (36.6 °C)  Heart Rate:  [57-75] 75  Resp:  [12-22] 18  BP: (100-175)/(51-87) 105/51  SpO2:  [83 %-100 %] 98 %  on   ;   Device (Oxygen Therapy): room air  Body mass index is 19.6 kg/m².    Wt Readings from Last 3 Encounters:   08/07/19 53.4 kg (117 lb 12.8 oz)   07/30/19 52 kg (114 lb 11.2 oz)   07/23/19 59 kg (130 lb)       Intake/Output Summary (Last 24 hours) at 8/7/2019 1613  Last data filed at 8/7/2019 1316  Gross per 24 hour   Intake 2114 ml   Output 900 ml   Net 1214 ml     Diet Regular; Cardiac, Consistent Carbohydrate  ----------------------------------------------------------------------------------------------------------------------  Physical  exam:  General: Comfortable, Not in distress.  Well-developed and well-nourished.   HENT:  Head:  Normocephalic and atraumatic.  Mouth:  Moist mucous membranes.    Eyes:  Conjunctivae and EOM are normal.  Pupils are equal, round, and reactive to light.  No scleral icterus.    Neck:  Neck supple.  No JVD present.  trachea midline.  Cardiovascular:  Normal rate, regular rhythm with no murmur.  Pulmonary/Chest:  No respiratory distress, no wheezes, no crackles, with normal breath sounds and good air movement.  Abdomen:  Soft.  Bowel sounds are normal.  No distension and no tenderness. No organomegaly.   Musculoskeletal:  No edema, no tenderness, and no deformity.  No red or swollen joints anywhere.  RLE bandaged.  Neurological:  Alert and oriented to person, place, and time.  No cranial nerve deficit. No focal deficits. No facial droop.  No slurred speech.   Skin:  Skin is warm and dry. No rash noted. No pallor.   Peripheral vascular: pulses in all 4 extremities with no clubbing, no cyanosis, no edema.  Genitourinary: no bebeto  ----------------------------------------------------------------------------------------------------------------------  ----------------------------------------------------------------------------------------------------------------------  Results from last 7 days   Lab Units 08/07/19  0341 08/06/19  0235 08/05/19  1144   CRP mg/dL  --  1.66* 2.94*   LACTATE mmol/L  --   --  1.5   WBC 10*3/mm3 4.23 4.98 5.51   HEMOGLOBIN g/dL 9.1* 10.8* 13.2   HEMATOCRIT % 29.6* 35.2 41.1   MCV fL 92.8 94.4 90.3   MCHC g/dL 30.7* 30.7* 32.1   PLATELETS 10*3/mm3 122* 122* 160     Results from last 7 days   Lab Units 08/07/19  0341 08/06/19  0235 08/05/19  1144   SODIUM mmol/L 144 142 139   POTASSIUM mmol/L 3.7 3.7 3.8   MAGNESIUM mg/dL 1.8  --  2.0   CHLORIDE mmol/L 109* 106 98   CO2 mmol/L 22.8 21.9* 25.3   BUN mg/dL 29* 33* 33*   CREATININE mg/dL 1.00 1.23* 1.19*   EGFR IF NONAFRICN AM mL/min/1.73 56* 44* 46*    CALCIUM mg/dL 7.9* 7.9* 8.9   GLUCOSE mg/dL 132* 203* 178*   ALBUMIN g/dL  --  2.46* 3.34*   BILIRUBIN mg/dL  --  <0.2* 0.3   ALK PHOS U/L  --  104 134*   AST (SGOT) U/L  --  21 17   ALT (SGPT) U/L  --  11 14   Estimated Creatinine Clearance: 49.8 mL/min (by C-G formula based on SCr of 1 mg/dL).  Results from last 7 days   Lab Units 08/06/19  0235 08/05/19  1144   TROPONIN T ng/mL <0.010 <0.010     Glucose   Date/Time Value Ref Range Status   08/07/2019 0702 87 70 - 130 mg/dL Final   08/07/2019 0615 53 (L) 70 - 130 mg/dL Final   08/07/2019 0140 72 70 - 130 mg/dL Final   08/07/2019 0126 48 (C) 70 - 130 mg/dL Final   08/06/2019 2035 174 (H) 70 - 130 mg/dL Final   08/06/2019 1607 319 (H) 70 - 130 mg/dL Final   08/06/2019 1231 230 (H) 70 - 130 mg/dL Final   08/06/2019 0923 166 (H) 70 - 130 mg/dL Final     No results found for: AMMONIA    Results from last 7 days   Lab Units 08/05/19  1250   NITRITE UA  Negative   WBC UA /HPF 3-5*   BACTERIA UA /HPF 2+*   SQUAM EPITHEL UA /HPF 0-2   URINECX  No growth     Blood Culture   Date Value Ref Range Status   08/05/2019 No growth at 24 hours  Preliminary   08/05/2019 No growth at 24 hours  Preliminary          I have personally looked at the labs and they are summarized above.  ----------------------------------------------------------------------------------------------------------------------  Imaging Results (last 24 hours)     ** No results found for the last 24 hours. **        I have personally reviewed the radiology images and read the final radiology report.    Assessment & Plan      Assessment:  Hypothermia secondary to depakote, resolved  HTN accelerated  SIRS   DM2, ID with hyperglycemia  CKD stage 3  Newly diagnosed seizure activity, non-convulsive with aphasia, resolved  Hypothryroidism  PAD  Chronic RLE OM  Depression  H/O tibia fibula fracture, in cast      Plan:  Hypothermia secondary to Depakote resolved.  Will dc Depakote.     08/6:This is the third episode of  hypothermia, 2 episodes happened during [facility] Hospitalization. discussed with Dr. Caban, neurologist at [facility] and she advised to decrease the dose to half for 24 hours and then stop it and then keep the patient for observation for 24 more hours.  Continue with Keppra.  Patient does not have history of adrenal insufficiency.  Patient was started on fludrocortisone for orthostatic hypotension which was discontinued in March 2019.  Cortisol level this morning normal.    Hypertension accelerated. BP stable. Continue with coreg and lisinopril and DC hydralazine.      Sirs with hypothermia and minimal elevated CRP.  No infectious etiology identified.  CRP trended down.  Patient currently afebrile and VSS.  No leukocytosis.  Blood cultures are negative prelim. CT with some findings of enteritis but patient is currently asymptomatic.  Will discontinue Rocephin and Flagyl.    Diabetes mellitus type 2 insulin depression with hyperglycemia, A1c 9.  continue with moderate dose sliding scale. Continue with ADA diet. Hypoglycemia today. Will decrease Levemir to 10 units and change to daily instead of nightly.     CKD stage III.  Creatinine stable around 1.0, baseline.  DC IV fluids and monitor closely    Newly diagnosed seizure activity, nonconvulsive with aphasia, per the neurologist Dr. Caban at [facility] likely secondary to cefepime, continue with Keppra and decrease dose of Depakote since likely Depakote is causing hypothermia.    For hypothyroidism new onset with elevated TSH and low free T3 and free T4, on low-dose levothyroxine    Continue with Plavix and Pravastatin for PAD.    For chronic right lower extremity osteomyelitis, on home Keflex from tomorrow.    For depression, continue with Lexapro and BuSpar    Heparin subcu for DVT prophylaxis  PT OT on board  DC in 24-48 hrs.     The patient is high risk due to the following diagnoses/reasons:  Hypothermia secondary to depakote,  resolved  HTN accelerated    Samanta Ibarra MD  19  4:13 PM    Electronically signed by Samanta Ibarra MD at 2019  4:20 PM       Consult Notes (last 24 hours) (Notes from 2019 11:29 AM through 2019 11:29 AM)     No notes of this type exist for this encounter.           Physical Therapy Notes (last 24 hours) (Notes from 2019 11:29 AM through 2019 11:29 AM)      Jack Moyer PTA at 2019  2:32 PM  Version 1 of 1         Acute Care - Physical Therapy Treatment Note  [facility]     Patient Name: Reny Elena  : 1960  MRN: [id number]  Today's Date: 2019  Onset of Illness/Injury or Date of Surgery: 19  Date of Referral to PT: 19  Referring Physician: Dr. Ibarra    Admit Date: 2019    Visit Dx:    ICD-10-CM ICD-9-CM   1. Hypothermia, initial encounter T68.XXXA 991.6   2. Enteritis K52.9 558.9     Patient Active Problem List   Diagnosis   • Severe sepsis (CMS/[facility])   • Tibia/fibula fracture   • Iron deficiency anemia   • Type 2 diabetes mellitus with hyperglycemia, with long-term current use of insulin (CMS/[facility])   • Wound of right ankle   • Hyperglycemia   • Confusion   • Cellulitis   • Metabolic encephalopathy   • History of non-ST elevation myocardial infarction (NSTEMI)   • CKD (chronic kidney disease) stage 3, GFR 30-59 ml/min (CMS/[facility])   • Elevated troponin   • HTN (hypertension)   • CVA (cerebral vascular accident) (CMS/HCC)   • Chronic diastolic CHF (congestive heart failure) (CMS/[facility])   • Decubitus ulcer of coccyx, unspecified pressure ulcer stage   • Depression   • Hyperkalemia   • Expressive aphasia   • Impaired mobility and ADLs   • Hypothermia       Therapy Treatment    Rehabilitation Treatment Summary     Row Name 19 1429             Treatment Time/Intention    Discipline  physical therapy assistant  -RB      Document Type  therapy note (daily note)  -RB      Subjective Information  complains of;fatigue  -RB      Mode of Treatment   physical therapy;individual therapy  -RB      Patient/Family Observations  Pt supine in bed and states that she has already walked today and is tired from being moved from 2nd to 3rd floor.  Pt educated on the importance of participating with therapy and states that she will resume tomorrow.  -RB      Therapy Frequency (PT Clinical Impression)  other (see comments) 3-5 times/ week per priority policy  -RB      Patient Effort  good  -RB      Existing Precautions/Restrictions  fall;non-weight bearing  -RB      Patient Response to Treatment  Pt supine in bed and states that she has already walked today and is tired from being moved from 2nd to 3rd floor.  Pt educated on the importance of participating with therapy and states that she will resume tomorrow.  -RB      Recorded by [RB] Jack Moyer PTA 08/07/19 1431      Row Name 08/07/19 1429             Cognitive Assessment/Intervention- PT/OT    Orientation Status (Cognition)  oriented x 3  -RB      Follows Commands (Cognition)  WFL  -RB      Recorded by [RB] Jack Moyer PTA 08/07/19 1431      Row Name 08/07/19 1429             Safety Issues, Functional Mobility    Impairments Affecting Function (Mobility)  balance;endurance/activity tolerance;strength  -RB      Recorded by [RB] Jack Moyer PTA 08/07/19 1431      Row Name 08/07/19 1429             Mobility Assessment/Intervention    Extremity Weight-bearing Status  left lower extremity;right lower extremity  -RB      Left Lower Extremity (Weight-bearing Status)  full weight-bearing (FWB)  -RB      Right Lower Extremity (Weight-bearing Status)  non weight-bearing (NWB)  -RB      Recorded by [RB] Jack Moyer PTA 08/07/19 1431      Row Name 08/07/19 1429             Transfer Assessment/Treatment    Transfer Assessment/Treatment  sit-stand transfer;stand-sit transfer;stand pivot/stand step transfer  -RB      Recorded by [RB] Jack Moyer PTA 08/07/19 1431      Row Name 08/07/19 1429              Positioning and Restraints    Pre-Treatment Position  in bed  -RB      Post Treatment Position  bed  -RB      In Bed  supine;call light within reach;encouraged to call for assist;side rails up x3  -RB      Recorded by [RB] Jack Moyer, [facility] 08/07/19 1431      Row Name 08/07/19 1429             Pain Scale: FACES Pre/Post-Treatment    Pain: FACES Scale, Pretreatment  0-->no hurt  -RB      Pain: FACES Scale, Post-Treatment  0-->no hurt  -RB      Recorded by [RB] Jack Moyer, PTA 08/07/19 1431      Row Name 08/07/19 1429             Sensory Assessment/Intervention    Sensory General Assessment  no sensation deficits identified  -RB      Recorded by [RB] Jack Moyer, PTA 08/07/19 1431      Row Name 08/07/19 1429             Vision Assessment/Intervention    Visual Impairment/Limitations  WFL  -RB      Recorded by [RB] Jack Moyer, PTA 08/07/19 1431      Row Name 08/07/19 1429             Coping    Observed Emotional State  calm;cooperative  -RB      Verbalized Emotional State  acceptance  -RB      Recorded by [RB] Jack Moyer, [facility] 08/07/19 1431      Row Name 08/07/19 1429             Outcome Summary/Treatment Plan (PT)    Anticipated Equipment Needs at Discharge (PT)  -- TBD  -RB      Anticipated Discharge Disposition (PT)  home with assist;home with home health  -RB      Recorded by [RB] Jack Moyer, [facility] 08/07/19 1431        User Key  (r) = Recorded By, (t) = Taken By, (c) = Cosigned By    Initials Name Effective Dates Discipline    RB Jack Moyer, PTA 03/29/19 -  PT                   Physical Therapy Education     Title: PT OT SLP Therapies (Done)     Topic: Physical Therapy (Done)     Point: Mobility training (Done)     Learning Progress Summary           Patient Acceptance, E, VU,NR by RB at 8/7/2019  2:31 PM    Acceptance, E, VU,NR by RR at 8/7/2019 12:59 AM                   Point: Home exercise program (Done)     Learning Progress Summary           Patient Acceptance, E, VU,NR by  RB at 8/7/2019  2:31 PM    Acceptance, E, VU,NR by RR at 8/7/2019 12:59 AM                   Point: Body mechanics (Done)     Learning Progress Summary           Patient Acceptance, E, VU,NR by RB at 8/7/2019  2:31 PM    Acceptance, E, VU,NR by RR at 8/7/2019 12:59 AM                   Point: Precautions (Done)     Learning Progress Summary           Patient Acceptance, E, VU,NR by RB at 8/7/2019  2:31 PM    Acceptance, E, VU,NR by RR at 8/7/2019 12:59 AM                               User Key     Initials Effective Dates Name Provider Type Discipline    RR 11/02/16 -  Janene Esquivel RN Registered Nurse Nurse    RB 03/29/19 -  Jack Moyer PTA Physical Therapy Assistant PT                PT Recommendation and Plan  Anticipated Discharge Disposition (PT): home with assist, home with home health  Therapy Frequency (PT Clinical Impression): other (see comments)(3-5 times/ week per priority policy)  Outcome Summary/Treatment Plan (PT)  Anticipated Equipment Needs at Discharge (PT): (TBD)  Anticipated Discharge Disposition (PT): home with assist, home with home health  Plan of Care Reviewed With: patient  Progress: no change  Outcome Summary: Pt supine in bed and states that she has already walked today and is tired from being moved from 2nd to 3rd floor.  Pt educated on the importance of participating with therapy and states that she will resume tomorrow.  Continue per POC.  Outcome Measures     Row Name 08/06/19 1607 08/06/19 1600 08/06/19 1500       How much help from another person do you currently need...    Turning from your back to your side while in flat bed without using bedrails?  --  --  4  -AG    Moving from lying on back to sitting on the side of a flat bed without bedrails?  --  --  4  -AG    Moving to and from a bed to a chair (including a wheelchair)?  --  --  3  -AG    Standing up from a chair using your arms (e.g., wheelchair, bedside chair)?  --  --  3  -AG    Climbing 3-5 steps with a railing?   --  --  2  -AG    To walk in hospital room?  --  --  3  -AG    AM-PAC 6 Clicks Score (PT)  --  --  19  -AG       How much help from another is currently needed...    Putting on and taking off regular lower body clothing?  3  -KR  --  --    Bathing (including washing, rinsing, and drying)  3  -KR  --  --    Toileting (which includes using toilet bed pan or urinal)  3  -KR  --  --    Putting on and taking off regular upper body clothing  3  -KR  --  --    Taking care of personal grooming (such as brushing teeth)  3  -KR  --  --    Eating meals  3  -KR  --  --    AM-PAC 6 Clicks Score (OT)  18  -KR  --  --       Functional Assessment    Outcome Measure Options  --  AM-PAC 6 Clicks Daily Activity (OT)  -KR  AM-PAC 6 Clicks Basic Mobility (PT)  -AG      User Key  (r) = Recorded By, (t) = Taken By, (c) = Cosigned By    Initials Name Provider Type    AG Anu Chin, PT Physical Therapist    Helder Panda, OT Occupational Therapist         Time Calculation:   PT Charges     Row Name 08/07/19 1431             Time Calculation    PT Received On  08/07/19  -RB      PT - Next Appointment  08/08/19  -RB      PT Goal Re-Cert Due Date  08/20/19  -RB         Timed Charges    53547 - PT Therapeutic Activity Minutes  15  -RB        User Key  (r) = Recorded By, (t) = Taken By, (c) = Cosigned By    Initials Name Provider Type    RB Jack Moyer PTA Physical Therapy Assistant        Therapy Charges for Today     Code Description Service Date Service Provider Modifiers Qty    [id number]  PT THERAPEUTIC ACT EA 15 MIN 8/7/2019 Jack Moyer PTA GP 1          PT G-Codes  Outcome Measure Options: AM-PAC 6 Clicks Daily Activity (OT)  AM-PAC 6 Clicks Score (PT): 19  AM-PAC 6 Clicks Score (OT): 18    Jack Moyer PTA  8/7/2019         Electronically signed by Jack Moyer PTA at 8/7/2019  2:32 PM       Occupational Therapy Notes (last 24 hours) (Notes from 8/7/2019 11:29 AM through 8/8/2019 11:29 AM)     No notes of  this type exist for this encounter.          Discharge Summary     No notes of this type exist for this encounter.        Discharge Order (From admission, onward)    Start     Ordered    08/08/19 1110  Discharge patient  Once     Expected Discharge Date:  08/08/19    Discharge Disposition:  Home or Self Care    Physician of Record for Attribution - Please select from Treatment Team:  ALE PANIAGUA [[id number]]    Review needed by CMO to determine Physician of Record:  No       Question Answer Comment   Physician of Record for Attribution - Please select from Treatment Team ALE PANIAGUA    Review needed by CMO to determine Physician of Record No        08/08/19 1117

## 2019-08-08 NOTE — PLAN OF CARE
Problem: Patient Care Overview  Goal: Plan of Care Review  Outcome: Ongoing (interventions implemented as appropriate)    Goal: Individualization and Mutuality  Outcome: Ongoing (interventions implemented as appropriate)    Goal: Discharge Needs Assessment  Outcome: Ongoing (interventions implemented as appropriate)    Goal: Interprofessional Rounds/Family Conf  Outcome: Ongoing (interventions implemented as appropriate)      Problem: Fall Risk (Adult)  Goal: Identify Related Risk Factors and Signs and Symptoms  Outcome: Ongoing (interventions implemented as appropriate)      Problem: Pain, Chronic (Adult)  Goal: Identify Related Risk Factors and Signs and Symptoms  Outcome: Ongoing (interventions implemented as appropriate)    Goal: Acceptable Pain/Comfort Level and Functional Ability  Outcome: Ongoing (interventions implemented as appropriate)      Problem: Skin Injury Risk (Adult)  Goal: Identify Related Risk Factors and Signs and Symptoms  Outcome: Ongoing (interventions implemented as appropriate)    Goal: Skin Health and Integrity  Outcome: Ongoing (interventions implemented as appropriate)

## 2019-08-08 NOTE — PROGRESS NOTES
Discharge Planning Assessment   [facility]     Patient Name: Reny Elena  MRN: [id number]  Today's Date: 8/8/2019    Admit Date: 8/5/2019      Discharge Plan     Row Name 08/08/19 1247       Plan    Final Discharge Disposition Code  06 - home with home health care    Final Note  Pt to be discharged home with continued [facility].  SS notified [facility] of pt discharge. SS faxed pt information to [facility] at [phone] and verified with Matias that [facility] has received information.  RN to call report.            Gisela Sandoval

## 2019-08-08 NOTE — DISCHARGE SUMMARY
[facility] HOSPITALISTS DISCHARGE SUMMARY    Patient Identification:  Name:  Reny Elena  Age:  59 y.o.  Sex:  female  :  1960  MRN:  [id number]  Visit Number:  [id number]    Date of Admission: 2019  Date of Discharge:  2019     PCP: Yandel Paulson MD    DISCHARGE DIAGNOSIS  Hypothermia secondary to depakote, resolved  HTN accelerated  SIRS   DM2, ID with hyperglycemia  CKD stage 3  Newly diagnosed seizure activity, non-convulsive with aphasia, resolved  Hypothryroidism  PAD  Chronic RLE OM  Depression  H/O tibia fibula fracture, in cast    CONSULTS   None    PROCEDURES PERFORMED      HOSPITAL COURSE  Ms. Elena is a 59 y.o. female with past medical history of type II IDDM, diastolic dysfunction, HTN, PAD, CKD stage 3 and chronic osteomyelitis of the right lower extremity presented to [facility] complaining of freezing cold, low body temperature.  Please see the admitting history and physical for further details.      She recently was hospitalized at [facility] from - for expressive aphasia due to nonconvulsive seizures based on EEG findings, and that the seizures were possibly related to cefepime which she had been receiving for chronic osteomyelitis. She was started on depakote and keppra. Cefepime was changed by ID to rocephin and later to keflex at time of discharge. She reports that while in the hospital she had two episodes of hypothermia, which her physicians could not explain.    Patient was noted to have temperature of 92.2 rectal on arrival to ED.  Hypothermia resolved with heating blanket and patient did not have any further episodes of hypothermia.  Did have initial sirs criteria but no infectious etiology identified.  CRP trended down.  Hypothermia was thought to be secondary to Depakote. Discussed with Dr. Caban, neurologist at [facility] and she advised to decrease the dose to 500 mg bid for 24 hours and then stop it and then  keep the patient for observation for 24 more hours.  Continue with Keppra.  Patient does not have history of adrenal insufficiency.  Patient was started on fludrocortisone for orthostatic hypotension which was discontinued in March 2019 since patient was having hypertension.    Depakote was discontinued and patient was observed for 24 hours and did not have any episodes of seizure. Patient did have episode of hypoglycemia so insulin dose was adjusted.  Did have accelerated hypertension at the time of admission which improved after antihypertensive adjustment.  Patient continued to remain vitally stable and improved symptomatically.  No further episodes of hypothermia or hypoglycemia.  PT OT consulted.  Since patient was vitally stable, improved symptomatically and would like to go home, it was decided to discharge the patient to home with adjusted insulin dosing.  Patient was advised to monitor blood pressure and blood sugar.  Discharge disposition stable.      VITAL SIGNS:  Temp:  [97.7 °F (36.5 °C)-98.5 °F (36.9 °C)] 98.1 °F (36.7 °C)  Heart Rate:  [69-78] 71  Resp:  [18] 18  BP: (105-174)/(51-75) 148/73  SpO2:  [96 %-98 %] 98 %  on   ;   Device (Oxygen Therapy): room air    Body mass index is 20.12 kg/m².  Wt Readings from Last 3 Encounters:   08/08/19 54.8 kg (120 lb 14.4 oz)   07/30/19 52 kg (114 lb 11.2 oz)   07/23/19 59 kg (130 lb)       PHYSICAL EXAM:  General: Comfortable, Not in distress.  Well-developed and well-nourished.   HENT:  Head:  Normocephalic and atraumatic.  Mouth:  Moist mucous membranes.    Eyes:  Conjunctivae and EOM are normal.  Pupils are equal, round, and reactive to light.  No scleral icterus.    Neck:  Neck supple.  No JVD present.  trachea midline.  Cardiovascular:  Normal rate, regular rhythm with no murmur.  Pulmonary/Chest:  No respiratory distress, no wheezes, no crackles, with normal breath sounds and good air movement.  Abdomen:  Soft.  Bowel sounds are normal.  No distension and  no tenderness. No organomegaly.   Musculoskeletal:  No edema, no tenderness, and no deformity.  No red or swollen joints anywhere.  RLE bandaged.  Neurological:  Alert and oriented to person, place, and time.  No cranial nerve deficit. No focal deficits. No facial droop.  No slurred speech.   Skin:  Skin is warm and dry. No rash noted. No pallor.   Peripheral vascular: pulses in all 4 extremities with no clubbing, no cyanosis, no edema.  Genitourinary: no tate      DISCHARGE DISPOSITION   Home, to resume     DISCHARGE MEDICATIONS:     Discharge Medications      New Medications      Instructions Start Date   carvedilol 12.5 MG tablet  Commonly known as:  COREG   12.5 mg, Oral, 2 Times Daily With Meals      levothyroxine 25 MCG tablet  Commonly known as:  SYNTHROID, LEVOTHROID   25 mcg, Oral, Daily         Changes to Medications      Instructions Start Date   gabapentin 600 MG tablet  Commonly known as:  NEURONTIN  What changed:    · medication strength  · how much to take  · when to take this   600 mg, Oral, 2 Times Daily      Insulin Glargine 100 UNIT/ML injection pen  Commonly known as:  ELSA LAIRD  What changed:    · how much to take  · when to take this   12 Units, Subcutaneous, Daily   Start Date:  8/9/2019        Continue These Medications      Instructions Start Date   ADMELOG 100 UNIT/ML injection  Generic drug:  insulin lispro   1-5 Units, Subcutaneous, 3 Times Daily Before Meals      busPIRone 10 MG tablet  Commonly known as:  BUSPAR   1 tablet, Oral, 3 Times Daily      Cephalexin 500 MG tablet   500 mg, Oral, Daily      CloNIDine 0.1 MG tablet  Commonly known as:  CATAPRES   0.1 mg, Oral, Every 6 Hours PRN      clopidogrel 75 MG tablet  Commonly known as:  PLAVIX   75 mg, Oral, Daily      escitalopram 10 MG tablet  Commonly known as:  LEXAPRO   10 mg, Oral, Daily      HYDROcodone-acetaminophen  MG per tablet  Commonly known as:  NORCO   1 tablet, Oral, Every 6 Hours PRN, Pharmacy directions  states every 4 to 6 hours prn pain.      levETIRAcetam 750 MG tablet  Commonly known as:  KEPPRA   750 mg, Oral, Every 12 Hours Scheduled      lisinopril 40 MG tablet  Commonly known as:  PRINIVIL,ZESTRIL   40 mg, Oral, Every 24 Hours Scheduled      pantoprazole 40 MG EC tablet  Commonly known as:  PROTONIX   40 mg, Oral, Daily      pravastatin 20 MG tablet  Commonly known as:  PRAVACHOL   20 mg, Oral, Daily      rOPINIRole 0.5 MG tablet  Commonly known as:  REQUIP   0.5 mg, Oral, 2 Times Daily, Take 1 hour before bedtime.      temazepam 30 MG capsule  Commonly known as:  RESTORIL   30 mg, Oral, Nightly      tiZANidine 4 MG tablet  Commonly known as:  ZANAFLEX   4 mg, Oral, Every 6 Hours PRN      vitamin D 48379 units capsule capsule  Commonly known as:  ERGOCALCIFEROL   50,000 Units, Oral, Weekly, Prior to [facility] Admission, Patient was on: takes on wednesdays         Stop These Medications    divalproex 500 MG DR tablet  Commonly known as:  DEPAKOTE     FLUoxetine 20 MG capsule  Commonly known as:  PROzac     metoprolol succinate XL 50 MG 24 hr tablet  Commonly known as:  TOPROL-XL            Diet Instructions     Diet: Consistent Carbohydrate, Cardiac      Discharge Diet:   Consistent Carbohydrate  Cardiac           Activity Instructions     Activity as Tolerated          No future appointments.    Additional Instructions for the Follow-ups that You Need to Schedule     Discharge Follow-up with PCP   As directed       Currently Documented PCP:    Yandel Paulson MD    PCP Phone Number:    [phone]     Follow Up Details:  WITHIN 5-7 DAYS         Discharge Follow-up with Specialty: Ortho as scheduled   As directed      Specialty:  Ortho as scheduled           Follow-up Information     Yandel Paulson MD .    Specialty:  Family Medicine  Why:  WITHIN 5-7 DAYS  Contact information:  [address] [place] [address]  [phone]                    TEST  RESULTS PENDING AT DISCHARGE   Order Current Status     Blood Culture - Blood, Arm, Right Preliminary result    Blood Culture - Blood, Wrist, Left Preliminary result           CODE STATUS  Code Status and Medical Interventions:   Ordered at: 08/05/19 1954     Level Of Support Discussed With:    Patient     Code Status:    CPR     Medical Interventions (Level of Support Prior to Arrest):    Full       Samanta Ibarra MD  08/08/19  11:17 AM    Please note that this discharge summary required more than 30 minutes to complete.    Please send a copy of this dictation to the following providers:  Yandel Paulson MD

## 2019-08-09 ENCOUNTER — READMISSION MANAGEMENT (OUTPATIENT)
Dept: CALL CENTER | Facility: HOSPITAL | Age: 59
End: 2019-08-09

## 2019-08-09 NOTE — OUTREACH NOTE
Prep Survey      Responses   Facility patient discharged from?  [facility]   Is patient eligible?  Yes   Discharge diagnosis  hypothermia   Does the patient have one of the following disease processes/diagnoses(primary or secondary)?  Other   Does the patient have Home health ordered?  Yes   What is the Home health agency?   [facility]   Is there a DME ordered?  No   Comments regarding appointments  see AVS   Medication alerts for this patient  STOP: depakote, prozac, toprol   Prep survey completed?  Yes          Fatou Ventura RN

## 2019-08-10 LAB
BACTERIA SPEC AEROBE CULT: NORMAL
BACTERIA SPEC AEROBE CULT: NORMAL

## 2019-08-12 ENCOUNTER — READMISSION MANAGEMENT (OUTPATIENT)
Dept: CALL CENTER | Facility: HOSPITAL | Age: 59
End: 2019-08-12

## 2019-08-12 NOTE — OUTREACH NOTE
Medical Week 1 Survey      Responses   Facility patient discharged from?  [facility]   Does the patient have one of the following disease processes/diagnoses(primary or secondary)?  Other   Is there a successful TCM telephone encounter documented?  No   Week 1 attempt successful?  Yes   Call start time  1235   Call end time  1240   Discharge diagnosis  hypothermia   Is patient permission given to speak with other caregiver?  Yes   List who call center can speak with  Júnior, sig other   Person spoke with today (if not patient) and relationship  Cliff, sig other   Meds reviewed with patient/caregiver?  Yes   Is the patient having any side effects they believe may be caused by any medication additions or changes?  No   Does the patient have all medications ordered at discharge?  Yes   Is the patient taking all medications as directed (includes completed medication regime)?  Yes   Does the patient have a primary care provider?   Yes   Comments regarding PCP  Yandel Paulson MD   Has the patient kept scheduled appointments due by today?  N/A   Comments  MARYJO CAMEJO FOR AUGUST 15  AT  10:40   What is the Home health agency?   [facility]   Has home health visited the patient within 72 hours of discharge?  Yes   Psychosocial issues?  No   Did the patient receive a copy of their discharge instructions?  Yes   Nursing interventions  Reviewed instructions with patient   What is the patient's perception of their health status since discharge?  Improving   Is the patient/caregiver able to teach back signs and symptoms related to disease process for when to call PCP?  Yes   Is the patient/caregiver able to teach back signs and symptoms related to disease process for when to call 911?  Yes   Is the patient/caregiver able to teach back the hierarchy of who to call/visit for symptoms/problems? PCP, Specialist, Home health nurse, Urgent Care, ED, 911  Yes   Week 1 call completed?  Yes          Charu Soto RN

## 2019-08-19 ENCOUNTER — READMISSION MANAGEMENT (OUTPATIENT)
Dept: CALL CENTER | Facility: HOSPITAL | Age: 59
End: 2019-08-19

## 2019-08-19 NOTE — OUTREACH NOTE
Medical Week 2 Survey      Responses   Facility patient discharged from?  [facility]   Does the patient have one of the following disease processes/diagnoses(primary or secondary)?  Other   Week 2 attempt successful?  Yes   Call start time  1519   Call end time  1523   Person spoke with today (if not patient) and relationship  Cliff, sig other   Meds reviewed with patient/caregiver?  Yes   Is the patient taking all medications as directed (includes completed medication regime)?  Yes   Has the patient kept scheduled appointments due by today?  Yes   Comments  Cliff, says is feeling better, spending time with grand children today   What is the patient's perception of their health status since discharge?  Improving   Week 2 Call Completed?  Yes   Wrap up additional comments  Feeling better, spending time with grand children.           Irina Vazquez RN

## 2019-08-26 ENCOUNTER — HOSPITAL ENCOUNTER (EMERGENCY)
Facility: HOSPITAL | Age: 59
Discharge: LEFT BEFORE DISCHARGE | End: 2019-08-26

## 2019-08-26 VITALS
BODY MASS INDEX: 19.66 KG/M2 | HEIGHT: 65 IN | WEIGHT: 118 LBS | TEMPERATURE: 97.6 F | DIASTOLIC BLOOD PRESSURE: 64 MMHG | OXYGEN SATURATION: 97 % | HEART RATE: 74 BPM | SYSTOLIC BLOOD PRESSURE: 146 MMHG | RESPIRATION RATE: 18 BRPM

## 2019-08-27 ENCOUNTER — READMISSION MANAGEMENT (OUTPATIENT)
Dept: CALL CENTER | Facility: HOSPITAL | Age: 59
End: 2019-08-27

## 2019-08-27 NOTE — OUTREACH NOTE
Medical Week 3 Survey      Responses   Facility patient discharged from?  [facility]   Does the patient have one of the following disease processes/diagnoses(primary or secondary)?  Other   Week 3 attempt successful?  No   Unsuccessful attempts  Attempt 1          Edy Lopez RN

## 2019-08-28 ENCOUNTER — READMISSION MANAGEMENT (OUTPATIENT)
Dept: CALL CENTER | Facility: HOSPITAL | Age: 59
End: 2019-08-28

## 2019-08-28 NOTE — OUTREACH NOTE
Medical Week 3 Survey      Responses   Facility patient discharged from?  [facility]   Does the patient have one of the following disease processes/diagnoses(primary or secondary)?  Other   Week 3 attempt successful?  No   Unsuccessful attempts  Attempt 2          Anu Mccarthy RN

## 2019-09-04 RX ORDER — METOPROLOL SUCCINATE 50 MG/1
TABLET, EXTENDED RELEASE ORAL
Qty: 30 TABLET | Refills: 5 | OUTPATIENT
Start: 2019-09-04

## 2019-09-22 ENCOUNTER — APPOINTMENT (OUTPATIENT)
Dept: CT IMAGING | Facility: HOSPITAL | Age: 59
End: 2019-09-22

## 2019-09-22 ENCOUNTER — HOSPITAL ENCOUNTER (EMERGENCY)
Facility: HOSPITAL | Age: 59
Discharge: HOME | End: 2019-09-22
Attending: EMERGENCY MEDICINE | Admitting: EMERGENCY MEDICINE

## 2019-09-22 ENCOUNTER — APPOINTMENT (OUTPATIENT)
Dept: GENERAL RADIOLOGY | Facility: HOSPITAL | Age: 59
End: 2019-09-22

## 2019-09-22 VITALS
RESPIRATION RATE: 18 BRPM | DIASTOLIC BLOOD PRESSURE: 69 MMHG | WEIGHT: 116 LBS | TEMPERATURE: 98.4 F | SYSTOLIC BLOOD PRESSURE: 107 MMHG | BODY MASS INDEX: 19.33 KG/M2 | HEIGHT: 65 IN | OXYGEN SATURATION: 99 % | HEART RATE: 70 BPM

## 2019-09-22 DIAGNOSIS — R51.9 ACUTE NONINTRACTABLE HEADACHE, UNSPECIFIED HEADACHE TYPE: ICD-10-CM

## 2019-09-22 DIAGNOSIS — I10 HYPERTENSION, UNSPECIFIED TYPE: ICD-10-CM

## 2019-09-22 DIAGNOSIS — R73.9 HYPERGLYCEMIA: Primary | ICD-10-CM

## 2019-09-22 LAB
A-A DO2: 50.2 MMHG (ref 0–300)
ACETONE BLD QL: NEGATIVE
ALBUMIN SERPL-MCNC: 3.91 G/DL (ref 3.5–5.2)
ALBUMIN/GLOB SERPL: 1.3 G/DL
ALP SERPL-CCNC: 195 U/L (ref 39–117)
ALT SERPL W P-5'-P-CCNC: 46 U/L (ref 1–33)
ANION GAP SERPL CALCULATED.3IONS-SCNC: 10.6 MMOL/L (ref 5–15)
AST SERPL-CCNC: 39 U/L (ref 1–32)
ATMOSPHERIC PRESS: 735 MMHG
BACTERIA UR QL AUTO: NORMAL /HPF
BASE EXCESS BLDV CALC-SCNC: -0.5 MMOL/L
BASOPHILS # BLD AUTO: 0.06 10*3/MM3 (ref 0–0.2)
BASOPHILS NFR BLD AUTO: 1.3 % (ref 0–1.5)
BDY SITE: ABNORMAL
BILIRUB SERPL-MCNC: 0.3 MG/DL (ref 0.2–1.2)
BILIRUB UR QL STRIP: NEGATIVE
BODY TEMPERATURE: 98.6 C
BUN BLD-MCNC: 34 MG/DL (ref 8–23)
BUN/CREAT SERPL: 24.3 (ref 7–25)
CALCIUM SPEC-SCNC: 8.9 MG/DL (ref 8.6–10.5)
CHLORIDE SERPL-SCNC: 95 MMOL/L (ref 98–107)
CLARITY UR: CLEAR
CO2 SERPL-SCNC: 27.4 MMOL/L (ref 22–29)
COLOR UR: YELLOW
CREAT BLD-MCNC: 1.4 MG/DL (ref 0.57–1)
DEPRECATED RDW RBC AUTO: 47 FL (ref 37–54)
EOSINOPHIL # BLD AUTO: 0.14 10*3/MM3 (ref 0–0.4)
EOSINOPHIL NFR BLD AUTO: 3.1 % (ref 0.3–6.2)
ERYTHROCYTE [DISTWIDTH] IN BLOOD BY AUTOMATED COUNT: 14.6 % (ref 12.3–15.4)
GFR SERPL CREATININE-BSD FRML MDRD: 38 ML/MIN/1.73
GLOBULIN UR ELPH-MCNC: 3 GM/DL
GLUCOSE BLD-MCNC: 562 MG/DL (ref 65–99)
GLUCOSE BLDC GLUCOMTR-MCNC: 354 MG/DL (ref 70–130)
GLUCOSE BLDC GLUCOMTR-MCNC: 426 MG/DL (ref 70–130)
GLUCOSE BLDC GLUCOMTR-MCNC: 459 MG/DL (ref 70–130)
GLUCOSE BLDC GLUCOMTR-MCNC: 522 MG/DL (ref 70–130)
GLUCOSE UR STRIP-MCNC: ABNORMAL MG/DL
HCO3 BLDV-SCNC: 26.1 MMOL/L
HCT VFR BLD AUTO: 32.8 % (ref 34–46.6)
HGB BLD-MCNC: 11.3 G/DL (ref 12–15.9)
HGB BLDA-MCNC: 11.5 G/DL (ref 12–16)
HGB UR QL STRIP.AUTO: ABNORMAL
HOROWITZ INDEX BLD+IHG-RTO: 21 %
HYALINE CASTS UR QL AUTO: NORMAL /LPF
IMM GRANULOCYTES # BLD AUTO: 0 10*3/MM3 (ref 0–0.05)
IMM GRANULOCYTES NFR BLD AUTO: 0 % (ref 0–0.5)
KETONES UR QL STRIP: NEGATIVE
LEUKOCYTE ESTERASE UR QL STRIP.AUTO: NEGATIVE
LIPASE SERPL-CCNC: 29 U/L (ref 13–60)
LYMPHOCYTES # BLD AUTO: 0.95 10*3/MM3 (ref 0.7–3.1)
LYMPHOCYTES NFR BLD AUTO: 21.3 % (ref 19.6–45.3)
MAGNESIUM SERPL-MCNC: 2.2 MG/DL (ref 1.6–2.4)
MCH RBC QN AUTO: 31 PG (ref 26.6–33)
MCHC RBC AUTO-ENTMCNC: 34.5 G/DL (ref 31.5–35.7)
MCV RBC AUTO: 89.9 FL (ref 79–97)
MODALITY: ABNORMAL
MONOCYTES # BLD AUTO: 0.18 10*3/MM3 (ref 0.1–0.9)
MONOCYTES NFR BLD AUTO: 4 % (ref 5–12)
NEUTROPHILS # BLD AUTO: 3.13 10*3/MM3 (ref 1.7–7)
NEUTROPHILS NFR BLD AUTO: 70.3 % (ref 42.7–76)
NITRITE UR QL STRIP: NEGATIVE
NT-PROBNP SERPL-MCNC: 452.7 PG/ML (ref 5–900)
PCO2 BLDV: 51.3 MM HG
PH BLDV: 7.32 PH UNITS
PH UR STRIP.AUTO: 7.5 [PH] (ref 5–8)
PLATELET # BLD AUTO: 167 10*3/MM3 (ref 140–450)
PMV BLD AUTO: 10.7 FL (ref 6–12)
PO2 BLDV: 32.9 MM HG
POTASSIUM BLD-SCNC: 5 MMOL/L (ref 3.5–5.2)
PROT SERPL-MCNC: 6.9 G/DL (ref 6–8.5)
PROT UR QL STRIP: ABNORMAL
RBC # BLD AUTO: 3.65 10*6/MM3 (ref 3.77–5.28)
RBC # UR: NORMAL /HPF
REF LAB TEST METHOD: NORMAL
SAO2 % BLDCOV: 59.4 %
SODIUM BLD-SCNC: 133 MMOL/L (ref 136–145)
SP GR UR STRIP: 1.01 (ref 1–1.03)
SQUAMOUS #/AREA URNS HPF: NORMAL /HPF
TROPONIN T SERPL-MCNC: <0.01 NG/ML (ref 0–0.03)
UROBILINOGEN UR QL STRIP: ABNORMAL
WBC NRBC COR # BLD: 4.46 10*3/MM3 (ref 3.4–10.8)
WBC UR QL AUTO: NORMAL /HPF

## 2019-09-22 PROCEDURE — 80053 COMPREHEN METABOLIC PANEL: CPT | Performed by: EMERGENCY MEDICINE

## 2019-09-22 PROCEDURE — 81001 URINALYSIS AUTO W/SCOPE: CPT | Performed by: EMERGENCY MEDICINE

## 2019-09-22 PROCEDURE — 83880 ASSAY OF NATRIURETIC PEPTIDE: CPT | Performed by: EMERGENCY MEDICINE

## 2019-09-22 PROCEDURE — 70450 CT HEAD/BRAIN W/O DYE: CPT

## 2019-09-22 PROCEDURE — 36415 COLL VENOUS BLD VENIPUNCTURE: CPT

## 2019-09-22 PROCEDURE — 82962 GLUCOSE BLOOD TEST: CPT

## 2019-09-22 PROCEDURE — 93005 ELECTROCARDIOGRAM TRACING: CPT | Performed by: EMERGENCY MEDICINE

## 2019-09-22 PROCEDURE — 82009 KETONE BODYS QUAL: CPT | Performed by: EMERGENCY MEDICINE

## 2019-09-22 PROCEDURE — 85025 COMPLETE CBC W/AUTO DIFF WBC: CPT | Performed by: EMERGENCY MEDICINE

## 2019-09-22 PROCEDURE — 83735 ASSAY OF MAGNESIUM: CPT | Performed by: EMERGENCY MEDICINE

## 2019-09-22 PROCEDURE — 71045 X-RAY EXAM CHEST 1 VIEW: CPT

## 2019-09-22 PROCEDURE — 84484 ASSAY OF TROPONIN QUANT: CPT | Performed by: EMERGENCY MEDICINE

## 2019-09-22 PROCEDURE — 63710000001 INSULIN ASPART PER 5 UNITS: Performed by: EMERGENCY MEDICINE

## 2019-09-22 PROCEDURE — 96374 THER/PROPH/DIAG INJ IV PUSH: CPT

## 2019-09-22 PROCEDURE — 82805 BLOOD GASES W/O2 SATURATION: CPT | Performed by: EMERGENCY MEDICINE

## 2019-09-22 PROCEDURE — 99284 EMERGENCY DEPT VISIT MOD MDM: CPT

## 2019-09-22 PROCEDURE — 83690 ASSAY OF LIPASE: CPT | Performed by: EMERGENCY MEDICINE

## 2019-09-22 PROCEDURE — 25010000002 HYDRALAZINE PER 20 MG: Performed by: EMERGENCY MEDICINE

## 2019-09-22 RX ORDER — SODIUM CHLORIDE 0.9 % (FLUSH) 0.9 %
10 SYRINGE (ML) INJECTION AS NEEDED
Status: DISCONTINUED | OUTPATIENT
Start: 2019-09-22 | End: 2019-09-22 | Stop reason: HOSPADM

## 2019-09-22 RX ORDER — HYDRALAZINE HYDROCHLORIDE 20 MG/ML
10 INJECTION INTRAMUSCULAR; INTRAVENOUS ONCE
Status: DISCONTINUED | OUTPATIENT
Start: 2019-09-22 | End: 2019-09-22

## 2019-09-22 RX ORDER — HYDRALAZINE HYDROCHLORIDE 20 MG/ML
10 INJECTION INTRAMUSCULAR; INTRAVENOUS ONCE
Status: COMPLETED | OUTPATIENT
Start: 2019-09-22 | End: 2019-09-22

## 2019-09-22 RX ADMIN — SODIUM CHLORIDE 1000 ML: 9 INJECTION, SOLUTION INTRAVENOUS at 03:10

## 2019-09-22 RX ADMIN — HYDRALAZINE HYDROCHLORIDE 10 MG: 20 INJECTION INTRAMUSCULAR; INTRAVENOUS at 03:06

## 2019-09-22 RX ADMIN — INSULIN ASPART 10 UNITS: 100 INJECTION, SOLUTION INTRAVENOUS; SUBCUTANEOUS at 03:37

## 2019-09-22 RX ADMIN — SODIUM CHLORIDE 1000 ML: 9 INJECTION, SOLUTION INTRAVENOUS at 01:41

## 2019-09-22 NOTE — ED NOTES
Pt resting quietly on stretcher with complaints of a headache.  Pt AAOx4 with no resp distress noted, respirations even and unlabored.  Pt denies any needs at this time.  Skin PWD. Will continue to monitor and follow plan of care.  Bed rails up x2, bed in lowest position, call light in reach.       Patrizia Osorio, RN  09/22/19 5702

## 2019-09-22 NOTE — ED PROVIDER NOTES
Subjective   59-year-old female presents with complaints of hypertension, headache, hyperglycemia.  She reports her blood pressure has been high all day today.  She has been taking her blood pressure medication including her clonidine.  Associated symptoms include headache and dizziness.  She denies falls or trauma.  She denies numbness or weakness anywhere.  She does have hyperglycemia and states that she has not taken her fast acting insulin today.  No recent fevers or other illnesses including vomiting or diarrhea.        History provided by:  Patient   used: No        Review of Systems   Constitutional: Positive for fatigue. Negative for fever.   Eyes: Negative.    Respiratory: Positive for shortness of breath.    Cardiovascular: Negative.  Negative for chest pain.   Gastrointestinal: Negative.  Negative for abdominal pain.   Genitourinary: Negative.  Negative for dysuria.   Musculoskeletal: Negative.    Skin: Negative.    Neurological: Positive for dizziness and headaches. Negative for syncope, weakness and numbness.   Psychiatric/Behavioral: Negative.    All other systems reviewed and are negative.      Past Medical History:   Diagnosis Date   • Arthritis    • Closed fracture of right fibula and tibia 12/25/2018   • Depression    • Diabetic ulcer of left foot associated with type 2 diabetes mellitus (CMS/[facility]) 6/23/2017   • Diastolic dysfunction    • Essential hypertension    • Hyperlipidemia    • Iron deficiency anemia 12/30/2018   • PAD (peripheral artery disease) (CMS/[facility])    • Type 2 diabetes mellitus with hyperglycemia, with long-term current use of insulin (CMS/[facility]) 12/30/2018       Allergies   Allergen Reactions   • Penicillins Other (See Comments)     Patient has received cefazolin, ceftriaxone and cefepime during past admissions.   • Codeine Rash     Pt tolerates Norco @ home   • Sulfa Antibiotics Rash     NAUSEA TOO       Past Surgical History:   Procedure Laterality Date   • BACK  SURGERY      Post spinal diskectomy, osteophytectomy in one lumbar interspace   • CATARACT EXTRACTION      Left    •  SECTION     • DENTAL PROCEDURE     • HYSTERECTOMY     • INCISION AND DRAINAGE LEG Left 4/15/2017    Procedure: INCISION AND DRAINAGE LOWER EXTREMITY;  Surgeon: Basilio Morris MD;  Location: [facility] OR;  Service:    • INCISION AND DRAINAGE LEG Left 2017    Procedure: Irrigation and Debridment abcess diabetic wound left foot with deep culture;  Surgeon: Basilio Morris MD;  Location: [facility] OR;  Service:    • KNEE ARTHROSCOPY Right    • ORIF TIBIA FRACTURE Right 2018    Procedure: TIBIAL  FRACTURE OPEN REDUCTION INTERNAL FIXATION;  Surgeon: Jung Barragan MD;  Location: [facility] OR;  Service: Orthopedics   • TOE AMPUTATION Right     5th   • TUBAL ABDOMINAL LIGATION         Family History   Problem Relation Age of Onset   • Heart disease Mother    • Cancer Mother    • COPD Mother    • Diabetes Other    • Depression Other        Social History     Socioeconomic History   • Marital status:      Spouse name: Not on file   • Number of children: Not on file   • Years of education: Not on file   • Highest education level: Not on file   Tobacco Use   • Smoking status: Never Smoker   • Smokeless tobacco: Never Used   Substance and Sexual Activity   • Alcohol use: No     Frequency: Never   • Drug use: No   • Sexual activity: Defer           Objective   Physical Exam   Constitutional: She is oriented to person, place, and time. She appears well-developed and well-nourished. No distress.   HENT:   Head: Normocephalic and atraumatic.   Right Ear: External ear normal.   Left Ear: External ear normal.   Nose: Nose normal.   Eyes: Conjunctivae and EOM are normal. Pupils are equal, round, and reactive to light.   Neck: Normal range of motion. Neck supple. No JVD present. No tracheal deviation present.   Cardiovascular: Normal rate, regular rhythm and normal heart sounds.   No murmur  heard.  Pulmonary/Chest: Effort normal and breath sounds normal. No respiratory distress. She has no wheezes.   Abdominal: Soft. Bowel sounds are normal. There is no tenderness.   Musculoskeletal: Normal range of motion. She exhibits no edema or deformity.   Neurological: She is alert and oriented to person, place, and time. She has normal strength. No cranial nerve deficit or sensory deficit. Coordination normal.   Skin: Skin is warm and dry. No rash noted. No erythema. No pallor.   Psychiatric: She has a normal mood and affect. Her behavior is normal. Thought content normal.   Nursing note and vitals reviewed.      Procedures    EKG: normal EKG, normal sinus rhythm, unchanged from previous tracings.           ED Course                  MDM  Number of Diagnoses or Management Options  Acute nonintractable headache, unspecified headache type:   Hyperglycemia:   Hypertension, unspecified type:   Diagnosis management comments: 59-year-old female presents with hyperglycemia, hypertension, headache.  First, patient notes she has not been taking her insulin.  I think this is the cause for her hyperglycemia.  No evidence for DKA.  Blood sugar has improved with IV fluids and insulin here.  Second, blood pressures been elevated as well.  It has improved after her home clonidine and a dose of hydralazine here.  Remained stable 150 systolic.  Symptoms resolved.  Recommend she resume home meds and follow-up very closely.  No other emergent conditions found for headache.  Labs and other work-up negative.  Patient reassessed, sleeping comfortably, no acute distress, requesting discharge.  No indications for admission or further work-up.  She is happy with the plan.       Amount and/or Complexity of Data Reviewed  Clinical lab tests: ordered and reviewed  Tests in the radiology section of CPT®: reviewed and ordered  Independent visualization of images, tracings, or specimens: yes        Final diagnoses:   Hyperglycemia    Hypertension, unspecified type   Acute nonintractable headache, unspecified headache type              Edy Thomas MD  09/22/19 6309

## 2019-09-22 NOTE — ED NOTES
Pt resting quietly on stretcher with complaints of a headache.  Pt AAOx4 with no resp distress noted, respirations even and unlabored.  Pt denies any needs at this time.  Skin PWD. Will continue to monitor and follow plan of care.  Bed rails up x2, bed in lowest position, call light in reach.           Patrizia Osorio, RN  09/22/19 6588

## 2019-09-22 NOTE — ED NOTES
Pt resting quietly on stretcher with complaints of a headache.  Pt AAOx4 with no resp distress noted, respirations even and unlabored.  Pt denies any needs at this time.  Skin PWD. Will continue to monitor and follow plan of care.  Bed rails up x2, bed in lowest position, call light in reach.       Patrizia Osorio, RN  09/22/19 3609

## 2019-09-22 NOTE — ED NOTES
Pt resting quietly on stretcher with improving complaints of a headache.  Pt AAOx4 with no resp distress noted, respirations even and unlabored.  Pt denies any needs at this time.  Skin PWD. Will continue to monitor and follow plan of care.  Bed rails up x2, bed in lowest position, call light in reach.       Patrizia Osorio, RN  09/22/19 1884

## 2020-01-23 ENCOUNTER — APPOINTMENT (OUTPATIENT)
Dept: CT IMAGING | Facility: HOSPITAL | Age: 60
End: 2020-01-23

## 2020-01-23 ENCOUNTER — HOSPITAL ENCOUNTER (EMERGENCY)
Facility: HOSPITAL | Age: 60
Discharge: HOME | End: 2020-01-23
Attending: EMERGENCY MEDICINE | Admitting: EMERGENCY MEDICINE

## 2020-01-23 VITALS
BODY MASS INDEX: 20.16 KG/M2 | DIASTOLIC BLOOD PRESSURE: 77 MMHG | OXYGEN SATURATION: 99 % | HEART RATE: 101 BPM | HEIGHT: 65 IN | TEMPERATURE: 99.1 F | RESPIRATION RATE: 18 BRPM | SYSTOLIC BLOOD PRESSURE: 147 MMHG | WEIGHT: 121 LBS

## 2020-01-23 DIAGNOSIS — I10 HYPERTENSION, UNSPECIFIED TYPE: Primary | ICD-10-CM

## 2020-01-23 DIAGNOSIS — F80.81 STUTTERING: ICD-10-CM

## 2020-01-23 LAB
ALBUMIN SERPL-MCNC: 3.68 G/DL (ref 3.5–5.2)
ALBUMIN/GLOB SERPL: 1.2 G/DL
ALP SERPL-CCNC: 128 U/L (ref 39–117)
ALT SERPL W P-5'-P-CCNC: 25 U/L (ref 1–33)
ANION GAP SERPL CALCULATED.3IONS-SCNC: 11.6 MMOL/L (ref 5–15)
AST SERPL-CCNC: 25 U/L (ref 1–32)
BASOPHILS # BLD AUTO: 0.04 10*3/MM3 (ref 0–0.2)
BASOPHILS NFR BLD AUTO: 0.9 % (ref 0–1.5)
BILIRUB SERPL-MCNC: 0.4 MG/DL (ref 0.2–1.2)
BUN BLD-MCNC: 38 MG/DL (ref 8–23)
BUN/CREAT SERPL: 18.3 (ref 7–25)
CALCIUM SPEC-SCNC: 8.9 MG/DL (ref 8.6–10.5)
CHLORIDE SERPL-SCNC: 97 MMOL/L (ref 98–107)
CO2 SERPL-SCNC: 25.4 MMOL/L (ref 22–29)
CREAT BLD-MCNC: 2.08 MG/DL (ref 0.57–1)
DEPRECATED RDW RBC AUTO: 39 FL (ref 37–54)
EOSINOPHIL # BLD AUTO: 0.21 10*3/MM3 (ref 0–0.4)
EOSINOPHIL NFR BLD AUTO: 4.6 % (ref 0.3–6.2)
ERYTHROCYTE [DISTWIDTH] IN BLOOD BY AUTOMATED COUNT: 12.1 % (ref 12.3–15.4)
GFR SERPL CREATININE-BSD FRML MDRD: 24 ML/MIN/1.73
GLOBULIN UR ELPH-MCNC: 3.1 GM/DL
GLUCOSE BLD-MCNC: 334 MG/DL (ref 65–99)
HCT VFR BLD AUTO: 33.1 % (ref 34–46.6)
HGB BLD-MCNC: 11.1 G/DL (ref 12–15.9)
HOLD SPECIMEN: NORMAL
HOLD SPECIMEN: NORMAL
IMM GRANULOCYTES # BLD AUTO: 0.01 10*3/MM3 (ref 0–0.05)
IMM GRANULOCYTES NFR BLD AUTO: 0.2 % (ref 0–0.5)
LYMPHOCYTES # BLD AUTO: 1.42 10*3/MM3 (ref 0.7–3.1)
LYMPHOCYTES NFR BLD AUTO: 31.3 % (ref 19.6–45.3)
MCH RBC QN AUTO: 29.7 PG (ref 26.6–33)
MCHC RBC AUTO-ENTMCNC: 33.5 G/DL (ref 31.5–35.7)
MCV RBC AUTO: 88.5 FL (ref 79–97)
MONOCYTES # BLD AUTO: 0.28 10*3/MM3 (ref 0.1–0.9)
MONOCYTES NFR BLD AUTO: 6.2 % (ref 5–12)
NEUTROPHILS # BLD AUTO: 2.57 10*3/MM3 (ref 1.7–7)
NEUTROPHILS NFR BLD AUTO: 56.8 % (ref 42.7–76)
NRBC BLD AUTO-RTO: 0 /100 WBC (ref 0–0.2)
PLATELET # BLD AUTO: 157 10*3/MM3 (ref 140–450)
PMV BLD AUTO: 10 FL (ref 6–12)
POTASSIUM BLD-SCNC: 4.8 MMOL/L (ref 3.5–5.2)
PROT SERPL-MCNC: 6.8 G/DL (ref 6–8.5)
RBC # BLD AUTO: 3.74 10*6/MM3 (ref 3.77–5.28)
SODIUM BLD-SCNC: 134 MMOL/L (ref 136–145)
TROPONIN T SERPL-MCNC: <0.01 NG/ML (ref 0–0.03)
WBC NRBC COR # BLD: 4.53 10*3/MM3 (ref 3.4–10.8)
WHOLE BLOOD HOLD SPECIMEN: NORMAL
WHOLE BLOOD HOLD SPECIMEN: NORMAL

## 2020-01-23 PROCEDURE — 84484 ASSAY OF TROPONIN QUANT: CPT | Performed by: EMERGENCY MEDICINE

## 2020-01-23 PROCEDURE — 93005 ELECTROCARDIOGRAM TRACING: CPT | Performed by: NURSE PRACTITIONER

## 2020-01-23 PROCEDURE — 93010 ELECTROCARDIOGRAM REPORT: CPT | Performed by: INTERNAL MEDICINE

## 2020-01-23 PROCEDURE — 96374 THER/PROPH/DIAG INJ IV PUSH: CPT

## 2020-01-23 PROCEDURE — 80053 COMPREHEN METABOLIC PANEL: CPT | Performed by: EMERGENCY MEDICINE

## 2020-01-23 PROCEDURE — 70450 CT HEAD/BRAIN W/O DYE: CPT | Performed by: RADIOLOGY

## 2020-01-23 PROCEDURE — 25010000002 HYDRALAZINE PER 20 MG: Performed by: NURSE PRACTITIONER

## 2020-01-23 PROCEDURE — 85025 COMPLETE CBC W/AUTO DIFF WBC: CPT | Performed by: EMERGENCY MEDICINE

## 2020-01-23 PROCEDURE — 70450 CT HEAD/BRAIN W/O DYE: CPT

## 2020-01-23 PROCEDURE — 99284 EMERGENCY DEPT VISIT MOD MDM: CPT

## 2020-01-23 RX ORDER — ACETAMINOPHEN 325 MG/1
650 TABLET ORAL EVERY 6 HOURS PRN
Status: DISCONTINUED | OUTPATIENT
Start: 2020-01-23 | End: 2020-01-23 | Stop reason: HOSPADM

## 2020-01-23 RX ORDER — SODIUM CHLORIDE 0.9 % (FLUSH) 0.9 %
10 SYRINGE (ML) INJECTION AS NEEDED
Status: DISCONTINUED | OUTPATIENT
Start: 2020-01-23 | End: 2020-01-23 | Stop reason: HOSPADM

## 2020-01-23 RX ORDER — HYDRALAZINE HYDROCHLORIDE 20 MG/ML
20 INJECTION INTRAMUSCULAR; INTRAVENOUS ONCE
Status: COMPLETED | OUTPATIENT
Start: 2020-01-23 | End: 2020-01-23

## 2020-01-23 RX ADMIN — HYDRALAZINE HYDROCHLORIDE 20 MG: 20 INJECTION INTRAMUSCULAR; INTRAVENOUS at 11:19

## 2020-01-23 RX ADMIN — ACETAMINOPHEN 650 MG: 325 TABLET ORAL at 15:27

## 2020-01-23 NOTE — DISCHARGE INSTRUCTIONS
Follow up with your primary care provider for further evaluation of blood pressure regimen    Return to the emergency room for worsening symptoms.

## 2020-01-23 NOTE — ED PROVIDER NOTES
Subjective     History provided by:  Patient   used: No    Hypertension   Severity:  Moderate  Onset quality:  Sudden  Duration:  3 days  Timing:  Constant  Progression:  Worsening  Chronicity:  Recurrent  Notable PTA blood pressures:  221/118  Context comment:  Patient denies any change  Relieved by:  Nothing  Worsened by:  Nothing  Ineffective treatments:  None tried  Associated symptoms: headaches    Associated symptoms: no chest pain, no loss of consciousness, no neck pain and not vomiting    Risk factors: diabetes    Risk factors: no alcohol use, no cardiac disease, no family history of hypertension, no kidney disease, no prior aneurysm and no prior stroke        Review of Systems   Constitutional: Negative.    Eyes: Negative.    Respiratory: Negative.    Cardiovascular: Negative.  Negative for chest pain.   Gastrointestinal: Negative.  Negative for vomiting.   Endocrine: Negative.    Genitourinary: Negative.    Musculoskeletal: Negative.  Negative for neck pain.   Skin: Negative.    Allergic/Immunologic: Negative.    Neurological: Positive for headaches. Negative for loss of consciousness.   Hematological: Negative.    Psychiatric/Behavioral: Negative.        Past Medical History:   Diagnosis Date   • Arthritis    • Closed fracture of right fibula and tibia 12/25/2018   • Depression    • Diabetic ulcer of left foot associated with type 2 diabetes mellitus (CMS/[facility]) 6/23/2017   • Diastolic dysfunction    • Essential hypertension    • Hyperlipidemia    • Iron deficiency anemia 12/30/2018   • PAD (peripheral artery disease) (CMS/[facility])    • Type 2 diabetes mellitus with hyperglycemia, with long-term current use of insulin (CMS/[facility]) 12/30/2018       Allergies   Allergen Reactions   • Penicillins Other (See Comments)     Patient has received cefazolin, ceftriaxone and cefepime during past admissions.   • Codeine Rash     Pt tolerates Norco @ home   • Sulfa Antibiotics Rash     NAUSEA TOO        Past Surgical History:   Procedure Laterality Date   • BACK SURGERY      Post spinal diskectomy, osteophytectomy in one lumbar interspace   • CATARACT EXTRACTION      Left    •  SECTION     • DENTAL PROCEDURE     • HYSTERECTOMY     • INCISION AND DRAINAGE LEG Left 4/15/2017    Procedure: INCISION AND DRAINAGE LOWER EXTREMITY;  Surgeon: Basilio Morris MD;  Location: [facility] OR;  Service:    • INCISION AND DRAINAGE LEG Left 2017    Procedure: Irrigation and Debridment abcess diabetic wound left foot with deep culture;  Surgeon: Basilio Morris MD;  Location: [facility] OR;  Service:    • KNEE ARTHROSCOPY Right    • ORIF TIBIA FRACTURE Right 2018    Procedure: TIBIAL  FRACTURE OPEN REDUCTION INTERNAL FIXATION;  Surgeon: Jung Barragan MD;  Location: [facility] OR;  Service: Orthopedics   • TOE AMPUTATION Right     5th   • TUBAL ABDOMINAL LIGATION         Family History   Problem Relation Age of Onset   • Heart disease Mother    • Cancer Mother    • COPD Mother    • Diabetes Other    • Depression Other        Social History     Socioeconomic History   • Marital status:      Spouse name: Not on file   • Number of children: Not on file   • Years of education: Not on file   • Highest education level: Not on file   Tobacco Use   • Smoking status: Never Smoker   • Smokeless tobacco: Never Used   Substance and Sexual Activity   • Alcohol use: No     Frequency: Never   • Drug use: No   • Sexual activity: Defer           Objective   Physical Exam   Constitutional: She is oriented to person, place, and time. She appears well-developed and well-nourished.   HENT:   Head: Normocephalic.   Right Ear: External ear normal.   Left Ear: External ear normal.   Mouth/Throat: Oropharynx is clear and moist.   Eyes: Pupils are equal, round, and reactive to light. Conjunctivae and EOM are normal.   Neck: Normal range of motion. Neck supple.   Cardiovascular: Normal rate, regular rhythm, normal heart sounds and intact  distal pulses.   Pulmonary/Chest: Effort normal and breath sounds normal.   Abdominal: Soft. Bowel sounds are normal.   Musculoskeletal: Normal range of motion.   Neurological: She is alert and oriented to person, place, and time.   Skin: Skin is warm and dry. Capillary refill takes less than 2 seconds.   Psychiatric: She has a normal mood and affect. Her behavior is normal. Thought content normal.   Nursing note and vitals reviewed.      Procedures           ED Course                                               MDM    Final diagnoses:   Hypertension, unspecified type   Stuttering            Darin Veronica, APRN  01/23/20 1508

## 2020-06-23 ENCOUNTER — HOSPITAL ENCOUNTER (EMERGENCY)
Facility: HOSPITAL | Age: 60
Discharge: HOME | End: 2020-06-23
Attending: EMERGENCY MEDICINE | Admitting: EMERGENCY MEDICINE

## 2020-06-23 VITALS
WEIGHT: 117 LBS | SYSTOLIC BLOOD PRESSURE: 127 MMHG | RESPIRATION RATE: 20 BRPM | OXYGEN SATURATION: 96 % | HEIGHT: 66 IN | TEMPERATURE: 98.7 F | BODY MASS INDEX: 18.8 KG/M2 | DIASTOLIC BLOOD PRESSURE: 70 MMHG | HEART RATE: 73 BPM

## 2020-06-23 DIAGNOSIS — R11.2 NAUSEA AND VOMITING, INTRACTABILITY OF VOMITING NOT SPECIFIED, UNSPECIFIED VOMITING TYPE: Primary | ICD-10-CM

## 2020-06-23 LAB
ALBUMIN SERPL-MCNC: 3.86 G/DL (ref 3.5–5.2)
ALBUMIN/GLOB SERPL: 1.3 G/DL
ALP SERPL-CCNC: 148 U/L (ref 39–117)
ALT SERPL W P-5'-P-CCNC: 50 U/L (ref 1–33)
ANION GAP SERPL CALCULATED.3IONS-SCNC: 10 MMOL/L (ref 5–15)
AST SERPL-CCNC: 36 U/L (ref 1–32)
BASOPHILS # BLD AUTO: 0.04 10*3/MM3 (ref 0–0.2)
BASOPHILS NFR BLD AUTO: 0.9 % (ref 0–1.5)
BILIRUB SERPL-MCNC: 0.2 MG/DL (ref 0.2–1.2)
BUN BLD-MCNC: 45 MG/DL (ref 8–23)
BUN/CREAT SERPL: 25.7 (ref 7–25)
CALCIUM SPEC-SCNC: 9.6 MG/DL (ref 8.6–10.5)
CHLORIDE SERPL-SCNC: 103 MMOL/L (ref 98–107)
CO2 SERPL-SCNC: 23 MMOL/L (ref 22–29)
CREAT BLD-MCNC: 1.75 MG/DL (ref 0.57–1)
DEPRECATED RDW RBC AUTO: 41.1 FL (ref 37–54)
EOSINOPHIL # BLD AUTO: 0.28 10*3/MM3 (ref 0–0.4)
EOSINOPHIL NFR BLD AUTO: 6.3 % (ref 0.3–6.2)
ERYTHROCYTE [DISTWIDTH] IN BLOOD BY AUTOMATED COUNT: 11.9 % (ref 12.3–15.4)
GFR SERPL CREATININE-BSD FRML MDRD: 29 ML/MIN/1.73
GLOBULIN UR ELPH-MCNC: 2.9 GM/DL
GLUCOSE BLD-MCNC: 387 MG/DL (ref 65–99)
HCT VFR BLD AUTO: 30.8 % (ref 34–46.6)
HGB BLD-MCNC: 10.2 G/DL (ref 12–15.9)
IMM GRANULOCYTES # BLD AUTO: 0.01 10*3/MM3 (ref 0–0.05)
IMM GRANULOCYTES NFR BLD AUTO: 0.2 % (ref 0–0.5)
LYMPHOCYTES # BLD AUTO: 1.37 10*3/MM3 (ref 0.7–3.1)
LYMPHOCYTES NFR BLD AUTO: 30.6 % (ref 19.6–45.3)
MAGNESIUM SERPL-MCNC: 2.1 MG/DL (ref 1.6–2.4)
MCH RBC QN AUTO: 30.9 PG (ref 26.6–33)
MCHC RBC AUTO-ENTMCNC: 33.1 G/DL (ref 31.5–35.7)
MCV RBC AUTO: 93.3 FL (ref 79–97)
MONOCYTES # BLD AUTO: 0.26 10*3/MM3 (ref 0.1–0.9)
MONOCYTES NFR BLD AUTO: 5.8 % (ref 5–12)
NEUTROPHILS # BLD AUTO: 2.51 10*3/MM3 (ref 1.7–7)
NEUTROPHILS NFR BLD AUTO: 56.2 % (ref 42.7–76)
NRBC BLD AUTO-RTO: 0 /100 WBC (ref 0–0.2)
PLATELET # BLD AUTO: 152 10*3/MM3 (ref 140–450)
PMV BLD AUTO: 9.8 FL (ref 6–12)
POTASSIUM BLD-SCNC: 5 MMOL/L (ref 3.5–5.2)
PROT SERPL-MCNC: 6.8 G/DL (ref 6–8.5)
RBC # BLD AUTO: 3.3 10*6/MM3 (ref 3.77–5.28)
SODIUM BLD-SCNC: 136 MMOL/L (ref 136–145)
WBC NRBC COR # BLD: 4.47 10*3/MM3 (ref 3.4–10.8)

## 2020-06-23 PROCEDURE — 25010000002 NALOXONE PER 1 MG: Performed by: EMERGENCY MEDICINE

## 2020-06-23 PROCEDURE — 83735 ASSAY OF MAGNESIUM: CPT | Performed by: EMERGENCY MEDICINE

## 2020-06-23 PROCEDURE — 96375 TX/PRO/DX INJ NEW DRUG ADDON: CPT

## 2020-06-23 PROCEDURE — 63710000001 INSULIN REGULAR HUMAN PER 5 UNITS: Performed by: EMERGENCY MEDICINE

## 2020-06-23 PROCEDURE — 96374 THER/PROPH/DIAG INJ IV PUSH: CPT

## 2020-06-23 PROCEDURE — 25010000002 ONDANSETRON PER 1 MG: Performed by: EMERGENCY MEDICINE

## 2020-06-23 PROCEDURE — 99284 EMERGENCY DEPT VISIT MOD MDM: CPT

## 2020-06-23 PROCEDURE — 80053 COMPREHEN METABOLIC PANEL: CPT | Performed by: EMERGENCY MEDICINE

## 2020-06-23 PROCEDURE — 25010000002 HYDRALAZINE PER 20 MG: Performed by: EMERGENCY MEDICINE

## 2020-06-23 PROCEDURE — 85025 COMPLETE CBC W/AUTO DIFF WBC: CPT | Performed by: EMERGENCY MEDICINE

## 2020-06-23 RX ORDER — ONDANSETRON 2 MG/ML
4 INJECTION INTRAMUSCULAR; INTRAVENOUS ONCE
Status: COMPLETED | OUTPATIENT
Start: 2020-06-23 | End: 2020-06-23

## 2020-06-23 RX ORDER — NALOXONE HCL 0.4 MG/ML
1 VIAL (ML) INJECTION ONCE
Status: COMPLETED | OUTPATIENT
Start: 2020-06-23 | End: 2020-06-23

## 2020-06-23 RX ORDER — ONDANSETRON 4 MG/1
4 TABLET, ORALLY DISINTEGRATING ORAL EVERY 6 HOURS PRN
Qty: 12 TABLET | Refills: 0 | Status: ON HOLD | OUTPATIENT
Start: 2020-06-23 | End: 2021-11-05

## 2020-06-23 RX ORDER — HYDRALAZINE HYDROCHLORIDE 20 MG/ML
20 INJECTION INTRAMUSCULAR; INTRAVENOUS ONCE
Status: COMPLETED | OUTPATIENT
Start: 2020-06-23 | End: 2020-06-23

## 2020-06-23 RX ORDER — CLONIDINE HYDROCHLORIDE 0.1 MG/1
0.1 TABLET ORAL ONCE
Status: COMPLETED | OUTPATIENT
Start: 2020-06-23 | End: 2020-06-23

## 2020-06-23 RX ADMIN — SODIUM CHLORIDE 500 ML: 9 INJECTION, SOLUTION INTRAVENOUS at 16:19

## 2020-06-23 RX ADMIN — SODIUM CHLORIDE 1000 ML: 9 INJECTION, SOLUTION INTRAVENOUS at 13:17

## 2020-06-23 RX ADMIN — HYDRALAZINE HYDROCHLORIDE 20 MG: 20 INJECTION INTRAMUSCULAR; INTRAVENOUS at 14:22

## 2020-06-23 RX ADMIN — SODIUM CHLORIDE 1000 ML: 9 INJECTION, SOLUTION INTRAVENOUS at 15:16

## 2020-06-23 RX ADMIN — NALOXONE HYDROCHLORIDE 1 MG: 0.4 INJECTION, SOLUTION INTRAMUSCULAR; INTRAVENOUS; SUBCUTANEOUS at 16:18

## 2020-06-23 RX ADMIN — HUMAN INSULIN 8 UNITS: 100 INJECTION, SOLUTION SUBCUTANEOUS at 15:42

## 2020-06-23 RX ADMIN — CLONIDINE HYDROCHLORIDE 0.1 MG: 0.1 TABLET ORAL at 13:54

## 2020-06-23 RX ADMIN — ONDANSETRON 4 MG: 2 INJECTION INTRAMUSCULAR; INTRAVENOUS at 13:17

## 2020-06-23 NOTE — ED NOTES
Patient discharged from ED at this time. Patient verbalized understanding of discharge instructions. Patient verbalized understanding of medication and follow up care. No needs or concerns voiced at this time. VSS. NADA.       Alexei Torre, GLORIA  06/23/20 [id number]

## 2020-06-23 NOTE — ED NOTES
Spoke with daughter she will be here to  patient in 30 mins.      Ashely Stevens RN  06/23/20 5019

## 2020-06-23 NOTE — ED NOTES
Dr. Perla aware of patient low BP, provider to bedside. New orders placed, patient placed in trendelenburg, Patient tolerated well.      Ashely Stevens RN  06/23/20 9839

## 2020-06-24 NOTE — ED PROVIDER NOTES
Subjective   Patient presents to ER with vomiting, and diarrhea      Vomiting   The primary symptoms include nausea, vomiting and diarrhea.   The illness is also significant for anorexia.       Review of Systems   Constitutional: Positive for activity change.   HENT: Negative.    Eyes: Negative.    Respiratory: Negative.    Cardiovascular: Negative.    Gastrointestinal: Positive for anorexia, diarrhea, nausea and vomiting.   Endocrine: Negative.    Genitourinary: Negative.    Musculoskeletal: Negative.    Skin: Negative.    Allergic/Immunologic: Negative.    Neurological: Negative.    Hematological: Negative.    Psychiatric/Behavioral: Negative.        Past Medical History:   Diagnosis Date   • Arthritis    • Closed fracture of right fibula and tibia 2018   • Depression    • Diabetic ulcer of left foot associated with type 2 diabetes mellitus (CMS/[facility]) 2017   • Diastolic dysfunction    • Essential hypertension    • Hyperlipidemia    • Iron deficiency anemia 2018   • PAD (peripheral artery disease) (CMS/[facility])    • Type 2 diabetes mellitus with hyperglycemia, with long-term current use of insulin (CMS/[facility]) 2018       Allergies   Allergen Reactions   • Penicillins Other (See Comments)     Patient has received cefazolin, ceftriaxone and cefepime during past admissions.   • Codeine Rash     Pt tolerates Norco @ home   • Sulfa Antibiotics Rash     NAUSEA TOO       Past Surgical History:   Procedure Laterality Date   • BACK SURGERY      Post spinal diskectomy, osteophytectomy in one lumbar interspace   • CATARACT EXTRACTION      Left    •  SECTION     • DENTAL PROCEDURE     • HYSTERECTOMY     • INCISION AND DRAINAGE LEG Left 4/15/2017    Procedure: INCISION AND DRAINAGE LOWER EXTREMITY;  Surgeon: Basilio Morris MD;  Location: [facility];  Service:    • INCISION AND DRAINAGE LEG Left 2017    Procedure: Irrigation and Debridment abcess diabetic wound left foot with deep culture;  Surgeon:  Basilio Morris MD;  Location: [facility] OR;  Service:    • KNEE ARTHROSCOPY Right    • ORIF TIBIA FRACTURE Right 12/28/2018    Procedure: TIBIAL  FRACTURE OPEN REDUCTION INTERNAL FIXATION;  Surgeon: Jung Barragan MD;  Location: [facility] OR;  Service: Orthopedics   • TOE AMPUTATION Right     5th   • TUBAL ABDOMINAL LIGATION         Family History   Problem Relation Age of Onset   • Heart disease Mother    • Cancer Mother    • COPD Mother    • Diabetes Other    • Depression Other        Social History     Socioeconomic History   • Marital status:      Spouse name: Not on file   • Number of children: Not on file   • Years of education: Not on file   • Highest education level: Not on file   Tobacco Use   • Smoking status: Never Smoker   • Smokeless tobacco: Never Used   Substance and Sexual Activity   • Alcohol use: No     Frequency: Never   • Drug use: No   • Sexual activity: Defer           Objective   Physical Exam   Constitutional: She appears well-developed and well-nourished.   HENT:   Head: Normocephalic and atraumatic.   Eyes: Pupils are equal, round, and reactive to light.   Neck: Normal range of motion.   Cardiovascular: Normal rate.   Pulmonary/Chest: Effort normal.   Abdominal: Soft.   Musculoskeletal: Normal range of motion.   Neurological: She is alert.   Skin: Skin is warm.   Psychiatric: She has a normal mood and affect.   Nursing note and vitals reviewed.      Procedures           ED Course                                           MDM    Final diagnoses:   Nausea and vomiting, intractability of vomiting not specified, unspecified vomiting type            González Perla MD  06/24/20 9558

## 2020-12-08 ENCOUNTER — LAB (OUTPATIENT)
Dept: LAB | Facility: HOSPITAL | Age: 60
End: 2020-12-08

## 2020-12-08 ENCOUNTER — TRANSCRIBE ORDERS (OUTPATIENT)
Dept: ADMINISTRATIVE | Facility: HOSPITAL | Age: 60
End: 2020-12-08

## 2020-12-08 DIAGNOSIS — R80.9 PROTEINURIA, UNSPECIFIED TYPE: ICD-10-CM

## 2020-12-08 DIAGNOSIS — R80.9 PROTEINURIA, UNSPECIFIED TYPE: Primary | ICD-10-CM

## 2020-12-08 PROCEDURE — 84165 PROTEIN E-PHORESIS SERUM: CPT

## 2020-12-08 PROCEDURE — 84155 ASSAY OF PROTEIN SERUM: CPT

## 2020-12-08 PROCEDURE — 86160 COMPLEMENT ANTIGEN: CPT

## 2020-12-08 PROCEDURE — 36415 COLL VENOUS BLD VENIPUNCTURE: CPT

## 2020-12-08 PROCEDURE — 82784 ASSAY IGA/IGD/IGG/IGM EACH: CPT

## 2020-12-08 PROCEDURE — 83520 IMMUNOASSAY QUANT NOS NONAB: CPT

## 2020-12-08 PROCEDURE — 86038 ANTINUCLEAR ANTIBODIES: CPT

## 2020-12-08 PROCEDURE — 86256 FLUORESCENT ANTIBODY TITER: CPT

## 2020-12-08 PROCEDURE — 80048 BASIC METABOLIC PNL TOTAL CA: CPT

## 2020-12-08 PROCEDURE — 86225 DNA ANTIBODY NATIVE: CPT

## 2020-12-08 PROCEDURE — 86334 IMMUNOFIX E-PHORESIS SERUM: CPT

## 2020-12-09 LAB
ANION GAP SERPL CALCULATED.3IONS-SCNC: 7.1 MMOL/L (ref 5–15)
BUN SERPL-MCNC: 32 MG/DL (ref 8–23)
BUN/CREAT SERPL: 16.5 (ref 7–25)
C3 SERPL-MCNC: 125 MG/DL (ref 82–167)
C4 SERPL-MCNC: 36 MG/DL (ref 14–44)
CALCIUM SPEC-SCNC: 8.6 MG/DL (ref 8.6–10.5)
CHLORIDE SERPL-SCNC: 108 MMOL/L (ref 98–107)
CO2 SERPL-SCNC: 22.9 MMOL/L (ref 22–29)
CREAT SERPL-MCNC: 1.94 MG/DL (ref 0.57–1)
GFR SERPL CREATININE-BSD FRML MDRD: 26 ML/MIN/1.73
GLUCOSE SERPL-MCNC: 294 MG/DL (ref 65–99)
POTASSIUM SERPL-SCNC: 5.7 MMOL/L (ref 3.5–5.2)
SODIUM SERPL-SCNC: 138 MMOL/L (ref 136–145)

## 2020-12-10 LAB
ALBUMIN SERPL ELPH-MCNC: 3.2 G/DL (ref 2.9–4.4)
ALBUMIN/GLOB SERPL: 1.2 {RATIO} (ref 0.7–1.7)
ALPHA1 GLOB SERPL ELPH-MCNC: 0.2 G/DL (ref 0–0.4)
ALPHA2 GLOB SERPL ELPH-MCNC: 0.9 G/DL (ref 0.4–1)
ANA SER QL: NEGATIVE
B-GLOBULIN SERPL ELPH-MCNC: 0.9 G/DL (ref 0.7–1.3)
DSDNA AB SER-ACNC: 2 IU/ML (ref 0–9)
GAMMA GLOB SERPL ELPH-MCNC: 0.8 G/DL (ref 0.4–1.8)
GLOBULIN SER-MCNC: 2.9 G/DL (ref 2.2–3.9)
IGA SERPL-MCNC: 210 MG/DL (ref 87–352)
IGG SERPL-MCNC: 749 MG/DL (ref 586–1602)
IGM SERPL-MCNC: 80 MG/DL (ref 26–217)
INTERPRETATION SERPL IEP-IMP: NORMAL
LABORATORY COMMENT REPORT: NORMAL
M PROTEIN SERPL ELPH-MCNC: NORMAL G/DL
PROT SERPL-MCNC: 6.1 G/DL (ref 6–8.5)

## 2020-12-11 LAB
C-ANCA TITR SER IF: NORMAL TITER
MYELOPEROXIDASE AB SER IA-ACNC: <9 U/ML (ref 0–9)
P-ANCA ATYPICAL TITR SER IF: NORMAL TITER
P-ANCA TITR SER IF: NORMAL TITER
PROTEINASE3 AB SER IA-ACNC: <3.5 U/ML (ref 0–3.5)

## 2021-02-18 ENCOUNTER — TRANSCRIBE ORDERS (OUTPATIENT)
Dept: ADMINISTRATIVE | Facility: HOSPITAL | Age: 61
End: 2021-02-18

## 2021-02-18 DIAGNOSIS — Z11.59 SPECIAL SCREENING EXAMINATION FOR VIRAL DISEASE: Primary | ICD-10-CM

## 2021-04-02 ENCOUNTER — LAB (OUTPATIENT)
Dept: LAB | Facility: HOSPITAL | Age: 61
End: 2021-04-02

## 2021-04-02 ENCOUNTER — TRANSCRIBE ORDERS (OUTPATIENT)
Dept: ADMINISTRATIVE | Facility: HOSPITAL | Age: 61
End: 2021-04-02

## 2021-04-02 DIAGNOSIS — E87.5 HYPERKALEMIA: ICD-10-CM

## 2021-04-02 DIAGNOSIS — E87.5 HYPERKALEMIA: Primary | ICD-10-CM

## 2021-04-02 DIAGNOSIS — N18.4 CHRONIC KIDNEY DISEASE, STAGE IV (SEVERE) (HCC): ICD-10-CM

## 2021-04-02 PROCEDURE — 84156 ASSAY OF PROTEIN URINE: CPT

## 2021-04-02 PROCEDURE — 82570 ASSAY OF URINE CREATININE: CPT

## 2021-04-02 PROCEDURE — 80048 BASIC METABOLIC PNL TOTAL CA: CPT

## 2021-04-02 PROCEDURE — 36415 COLL VENOUS BLD VENIPUNCTURE: CPT

## 2021-04-02 PROCEDURE — 84100 ASSAY OF PHOSPHORUS: CPT

## 2021-04-02 PROCEDURE — 82043 UR ALBUMIN QUANTITATIVE: CPT

## 2021-04-03 LAB
ALBUMIN UR-MCNC: 310.9 MG/DL
ANION GAP SERPL CALCULATED.3IONS-SCNC: 10.1 MMOL/L (ref 5–15)
BUN SERPL-MCNC: 48 MG/DL (ref 8–23)
BUN/CREAT SERPL: 19.4 (ref 7–25)
CALCIUM SPEC-SCNC: 8.5 MG/DL (ref 8.6–10.5)
CHLORIDE SERPL-SCNC: 108 MMOL/L (ref 98–107)
CO2 SERPL-SCNC: 19.9 MMOL/L (ref 22–29)
CREAT SERPL-MCNC: 2.47 MG/DL (ref 0.57–1)
CREAT UR-MCNC: 56.7 MG/DL
CREAT UR-MCNC: 56.7 MG/DL
GFR SERPL CREATININE-BSD FRML MDRD: 20 ML/MIN/1.73
GLUCOSE SERPL-MCNC: 301 MG/DL (ref 65–99)
MICROALBUMIN/CREAT UR: 5483.2 MG/G
PHOSPHATE SERPL-MCNC: 4.3 MG/DL (ref 2.5–4.5)
POTASSIUM SERPL-SCNC: 5.3 MMOL/L (ref 3.5–5.2)
PROT UR-MCNC: 401.4 MG/DL
PROT/CREAT UR: 7079.4 MG/G CREA (ref 0–200)
SODIUM SERPL-SCNC: 138 MMOL/L (ref 136–145)

## 2021-11-04 ENCOUNTER — TRANSCRIBE ORDERS (OUTPATIENT)
Dept: ADMINISTRATIVE | Facility: HOSPITAL | Age: 61
End: 2021-11-04

## 2021-11-04 ENCOUNTER — LAB (OUTPATIENT)
Dept: LAB | Facility: HOSPITAL | Age: 61
End: 2021-11-04

## 2021-11-04 ENCOUNTER — APPOINTMENT (OUTPATIENT)
Dept: GENERAL RADIOLOGY | Facility: HOSPITAL | Age: 61
End: 2021-11-04

## 2021-11-04 ENCOUNTER — HOSPITAL ENCOUNTER (INPATIENT)
Facility: HOSPITAL | Age: 61
LOS: 4 days | Discharge: HOME | End: 2021-11-09
Attending: EMERGENCY MEDICINE | Admitting: INTERNAL MEDICINE

## 2021-11-04 DIAGNOSIS — R79.89 OTHER SPECIFIED ABNORMAL FINDINGS OF BLOOD CHEMISTRY: ICD-10-CM

## 2021-11-04 DIAGNOSIS — R79.89 OTHER SPECIFIED ABNORMAL FINDINGS OF BLOOD CHEMISTRY: Primary | ICD-10-CM

## 2021-11-04 DIAGNOSIS — N17.9 ACUTE KIDNEY INJURY SUPERIMPOSED ON CHRONIC KIDNEY DISEASE (HCC): Primary | ICD-10-CM

## 2021-11-04 DIAGNOSIS — Z79.4 TYPE 2 DIABETES MELLITUS WITH HYPERGLYCEMIA, WITH LONG-TERM CURRENT USE OF INSULIN (HCC): ICD-10-CM

## 2021-11-04 DIAGNOSIS — E11.65 TYPE 2 DIABETES MELLITUS WITH HYPERGLYCEMIA, WITH LONG-TERM CURRENT USE OF INSULIN (HCC): ICD-10-CM

## 2021-11-04 DIAGNOSIS — N18.9 ACUTE KIDNEY INJURY SUPERIMPOSED ON CHRONIC KIDNEY DISEASE (HCC): Primary | ICD-10-CM

## 2021-11-04 LAB
A-A DO2: 27.9 MMHG (ref 0–300)
ALBUMIN SERPL-MCNC: 3.9 G/DL (ref 3.5–5.2)
ALBUMIN SERPL-MCNC: 4.02 G/DL (ref 3.5–5.2)
ALBUMIN/GLOB SERPL: 1.5 G/DL
ALBUMIN/GLOB SERPL: 1.6 G/DL
ALP SERPL-CCNC: 101 U/L (ref 39–117)
ALP SERPL-CCNC: 94 U/L (ref 39–117)
ALT SERPL W P-5'-P-CCNC: 10 U/L (ref 1–33)
ALT SERPL W P-5'-P-CCNC: 10 U/L (ref 1–33)
ANION GAP SERPL CALCULATED.3IONS-SCNC: 13 MMOL/L (ref 5–15)
ANION GAP SERPL CALCULATED.3IONS-SCNC: 14.1 MMOL/L (ref 5–15)
ANION GAP SERPL CALCULATED.3IONS-SCNC: 16.4 MMOL/L (ref 5–15)
ARTERIAL PATENCY WRIST A: ABNORMAL
AST SERPL-CCNC: 12 U/L (ref 1–32)
AST SERPL-CCNC: 14 U/L (ref 1–32)
ATMOSPHERIC PRESS: 733 MMHG
BACTERIA UR QL AUTO: ABNORMAL /HPF
BASE EXCESS BLDA CALC-SCNC: -7.5 MMOL/L (ref 0–2)
BASOPHILS # BLD AUTO: 0.03 10*3/MM3 (ref 0–0.2)
BASOPHILS NFR BLD AUTO: 0.7 % (ref 0–1.5)
BDY SITE: ABNORMAL
BILIRUB SERPL-MCNC: 0.2 MG/DL (ref 0–1.2)
BILIRUB SERPL-MCNC: <0.2 MG/DL (ref 0–1.2)
BILIRUB UR QL STRIP: NEGATIVE
BODY TEMPERATURE: 0 C
BUN SERPL-MCNC: 64 MG/DL (ref 8–23)
BUN SERPL-MCNC: 64 MG/DL (ref 8–23)
BUN SERPL-MCNC: 65 MG/DL (ref 8–23)
BUN/CREAT SERPL: 14 (ref 7–25)
BUN/CREAT SERPL: 14.2 (ref 7–25)
BUN/CREAT SERPL: 14.9 (ref 7–25)
CALCIUM SPEC-SCNC: 7.5 MG/DL (ref 8.6–10.5)
CALCIUM SPEC-SCNC: 7.6 MG/DL (ref 8.6–10.5)
CALCIUM SPEC-SCNC: 7.6 MG/DL (ref 8.6–10.5)
CHLORIDE SERPL-SCNC: 107 MMOL/L (ref 98–107)
CHLORIDE SERPL-SCNC: 111 MMOL/L (ref 98–107)
CHLORIDE SERPL-SCNC: 112 MMOL/L (ref 98–107)
CLARITY UR: CLEAR
CO2 BLDA-SCNC: 21.2 MMOL/L (ref 22–33)
CO2 SERPL-SCNC: 16 MMOL/L (ref 22–29)
CO2 SERPL-SCNC: 17.6 MMOL/L (ref 22–29)
CO2 SERPL-SCNC: 19.9 MMOL/L (ref 22–29)
COHGB MFR BLD: 1.1 % (ref 0–5)
COLOR UR: YELLOW
CREAT SERPL-MCNC: 4.29 MG/DL (ref 0.57–1)
CREAT SERPL-MCNC: 4.56 MG/DL (ref 0.57–1)
CREAT SERPL-MCNC: 4.57 MG/DL (ref 0.57–1)
DEPRECATED RDW RBC AUTO: 50.6 FL (ref 37–54)
EOSINOPHIL # BLD AUTO: 0.28 10*3/MM3 (ref 0–0.4)
EOSINOPHIL NFR BLD AUTO: 6.3 % (ref 0.3–6.2)
ERYTHROCYTE [DISTWIDTH] IN BLOOD BY AUTOMATED COUNT: 14.1 % (ref 12.3–15.4)
FLUAV RNA RESP QL NAA+PROBE: NOT DETECTED
FLUBV RNA RESP QL NAA+PROBE: NOT DETECTED
GFR SERPL CREATININE-BSD FRML MDRD: 10 ML/MIN/1.73
GFR SERPL CREATININE-BSD FRML MDRD: ABNORMAL ML/MIN/{1.73_M2}
GLOBULIN UR ELPH-MCNC: 2.4 GM/DL
GLOBULIN UR ELPH-MCNC: 2.7 GM/DL
GLUCOSE BLDC GLUCOMTR-MCNC: 152 MG/DL (ref 70–130)
GLUCOSE SERPL-MCNC: 123 MG/DL (ref 65–99)
GLUCOSE SERPL-MCNC: 129 MG/DL (ref 65–99)
GLUCOSE SERPL-MCNC: 53 MG/DL (ref 65–99)
GLUCOSE UR STRIP-MCNC: NEGATIVE MG/DL
HCO3 BLDA-SCNC: 19.8 MMOL/L (ref 20–26)
HCT VFR BLD AUTO: 25.9 % (ref 34–46.6)
HCT VFR BLD CALC: 24 % (ref 38–51)
HGB BLD-MCNC: 8 G/DL (ref 12–15.9)
HGB BLDA-MCNC: 7.8 G/DL (ref 13.5–17.5)
HGB UR QL STRIP.AUTO: ABNORMAL
HOLD SPECIMEN: NORMAL
HOLD SPECIMEN: NORMAL
HYALINE CASTS UR QL AUTO: ABNORMAL /LPF
IMM GRANULOCYTES # BLD AUTO: 0.01 10*3/MM3 (ref 0–0.05)
IMM GRANULOCYTES NFR BLD AUTO: 0.2 % (ref 0–0.5)
INHALED O2 CONCENTRATION: 21 %
KETONES UR QL STRIP: NEGATIVE
LEUKOCYTE ESTERASE UR QL STRIP.AUTO: ABNORMAL
LYMPHOCYTES # BLD AUTO: 1.27 10*3/MM3 (ref 0.7–3.1)
LYMPHOCYTES NFR BLD AUTO: 28.5 % (ref 19.6–45.3)
Lab: ABNORMAL
MAGNESIUM SERPL-MCNC: 2 MG/DL (ref 1.6–2.4)
MCH RBC QN AUTO: 30.7 PG (ref 26.6–33)
MCHC RBC AUTO-ENTMCNC: 30.9 G/DL (ref 31.5–35.7)
MCV RBC AUTO: 99.2 FL (ref 79–97)
METHGB BLD QL: 0.7 % (ref 0–3)
MODALITY: ABNORMAL
MONOCYTES # BLD AUTO: 0.24 10*3/MM3 (ref 0.1–0.9)
MONOCYTES NFR BLD AUTO: 5.4 % (ref 5–12)
NEUTROPHILS NFR BLD AUTO: 2.62 10*3/MM3 (ref 1.7–7)
NEUTROPHILS NFR BLD AUTO: 58.9 % (ref 42.7–76)
NITRITE UR QL STRIP: NEGATIVE
NOTE: ABNORMAL
NRBC BLD AUTO-RTO: 0 /100 WBC (ref 0–0.2)
OXYHGB MFR BLDV: 89.4 % (ref 94–99)
PCO2 BLDA: 48.2 MM HG (ref 35–45)
PCO2 TEMP ADJ BLD: ABNORMAL MM[HG]
PH BLDA: 7.22 PH UNITS (ref 7.35–7.45)
PH UR STRIP.AUTO: <=5 [PH] (ref 5–8)
PH, TEMP CORRECTED: ABNORMAL
PLATELET # BLD AUTO: 114 10*3/MM3 (ref 140–450)
PMV BLD AUTO: 10.4 FL (ref 6–12)
PO2 BLDA: 61.7 MM HG (ref 83–108)
PO2 TEMP ADJ BLD: ABNORMAL MM[HG]
POTASSIUM SERPL-SCNC: 5.1 MMOL/L (ref 3.5–5.2)
POTASSIUM SERPL-SCNC: 6 MMOL/L (ref 3.5–5.2)
POTASSIUM SERPL-SCNC: 6 MMOL/L (ref 3.5–5.2)
PROT SERPL-MCNC: 6.3 G/DL (ref 6–8.5)
PROT SERPL-MCNC: 6.7 G/DL (ref 6–8.5)
PROT UR QL STRIP: ABNORMAL
QT INTERVAL: 412 MS
QTC INTERVAL: 451 MS
RBC # BLD AUTO: 2.61 10*6/MM3 (ref 3.77–5.28)
RBC # UR: ABNORMAL /HPF
REF LAB TEST METHOD: ABNORMAL
SAO2 % BLDCOA: 91 % (ref 94–99)
SARS-COV-2 RNA RESP QL NAA+PROBE: NOT DETECTED
SODIUM SERPL-SCNC: 140 MMOL/L (ref 136–145)
SODIUM SERPL-SCNC: 141 MMOL/L (ref 136–145)
SODIUM SERPL-SCNC: 146 MMOL/L (ref 136–145)
SP GR UR STRIP: 1.01 (ref 1–1.03)
SQUAMOUS #/AREA URNS HPF: ABNORMAL /HPF
TROPONIN T SERPL-MCNC: 0.03 NG/ML (ref 0–0.03)
TROPONIN T SERPL-MCNC: 0.03 NG/ML (ref 0–0.03)
UROBILINOGEN UR QL STRIP: ABNORMAL
VENTILATOR MODE: ABNORMAL
WBC # BLD AUTO: 4.45 10*3/MM3 (ref 3.4–10.8)
WBC UR QL AUTO: ABNORMAL /HPF
WHOLE BLOOD HOLD SPECIMEN: NORMAL
WHOLE BLOOD HOLD SPECIMEN: NORMAL

## 2021-11-04 PROCEDURE — 84484 ASSAY OF TROPONIN QUANT: CPT | Performed by: EMERGENCY MEDICINE

## 2021-11-04 PROCEDURE — 71045 X-RAY EXAM CHEST 1 VIEW: CPT

## 2021-11-04 PROCEDURE — 82375 ASSAY CARBOXYHB QUANT: CPT

## 2021-11-04 PROCEDURE — 99285 EMERGENCY DEPT VISIT HI MDM: CPT

## 2021-11-04 PROCEDURE — 85025 COMPLETE CBC W/AUTO DIFF WBC: CPT | Performed by: PHYSICIAN ASSISTANT

## 2021-11-04 PROCEDURE — 36415 COLL VENOUS BLD VENIPUNCTURE: CPT

## 2021-11-04 PROCEDURE — 87186 SC STD MICRODIL/AGAR DIL: CPT | Performed by: EMERGENCY MEDICINE

## 2021-11-04 PROCEDURE — 81001 URINALYSIS AUTO W/SCOPE: CPT | Performed by: EMERGENCY MEDICINE

## 2021-11-04 PROCEDURE — 80306 DRUG TEST PRSMV INSTRMNT: CPT | Performed by: PHYSICIAN ASSISTANT

## 2021-11-04 PROCEDURE — 87086 URINE CULTURE/COLONY COUNT: CPT | Performed by: EMERGENCY MEDICINE

## 2021-11-04 PROCEDURE — 93005 ELECTROCARDIOGRAM TRACING: CPT | Performed by: PHYSICIAN ASSISTANT

## 2021-11-04 PROCEDURE — 80053 COMPREHEN METABOLIC PANEL: CPT | Performed by: PHYSICIAN ASSISTANT

## 2021-11-04 PROCEDURE — 63710000001 INSULIN REGULAR HUMAN PER 5 UNITS: Performed by: EMERGENCY MEDICINE

## 2021-11-04 PROCEDURE — 93010 ELECTROCARDIOGRAM REPORT: CPT | Performed by: INTERNAL MEDICINE

## 2021-11-04 PROCEDURE — 83735 ASSAY OF MAGNESIUM: CPT | Performed by: PHYSICIAN ASSISTANT

## 2021-11-04 PROCEDURE — 25010000002 CALCIUM GLUCONATE-NACL 1-0.675 GM/50ML-% SOLUTION: Performed by: EMERGENCY MEDICINE

## 2021-11-04 PROCEDURE — 82962 GLUCOSE BLOOD TEST: CPT

## 2021-11-04 PROCEDURE — 83036 HEMOGLOBIN GLYCOSYLATED A1C: CPT | Performed by: PHYSICIAN ASSISTANT

## 2021-11-04 PROCEDURE — 87077 CULTURE AEROBIC IDENTIFY: CPT | Performed by: EMERGENCY MEDICINE

## 2021-11-04 PROCEDURE — 80053 COMPREHEN METABOLIC PANEL: CPT

## 2021-11-04 PROCEDURE — 87636 SARSCOV2 & INF A&B AMP PRB: CPT | Performed by: EMERGENCY MEDICINE

## 2021-11-04 PROCEDURE — 83050 HGB METHEMOGLOBIN QUAN: CPT

## 2021-11-04 PROCEDURE — 36600 WITHDRAWAL OF ARTERIAL BLOOD: CPT

## 2021-11-04 PROCEDURE — 82805 BLOOD GASES W/O2 SATURATION: CPT

## 2021-11-04 RX ORDER — LACTULOSE 10 G/15ML
20 SOLUTION ORAL ONCE
Status: COMPLETED | OUTPATIENT
Start: 2021-11-04 | End: 2021-11-04

## 2021-11-04 RX ORDER — SODIUM CHLORIDE 9 MG/ML
125 INJECTION, SOLUTION INTRAVENOUS CONTINUOUS
Status: DISCONTINUED | OUTPATIENT
Start: 2021-11-04 | End: 2021-11-05

## 2021-11-04 RX ORDER — TRAZODONE HYDROCHLORIDE 50 MG/1
100 TABLET ORAL NIGHTLY
Status: DISCONTINUED | OUTPATIENT
Start: 2021-11-04 | End: 2021-11-09 | Stop reason: HOSPADM

## 2021-11-04 RX ORDER — CALCIUM GLUCONATE 20 MG/ML
1 INJECTION, SOLUTION INTRAVENOUS ONCE
Status: COMPLETED | OUTPATIENT
Start: 2021-11-04 | End: 2021-11-04

## 2021-11-04 RX ORDER — DEXTROSE MONOHYDRATE 25 G/50ML
50 INJECTION, SOLUTION INTRAVENOUS ONCE
Status: COMPLETED | OUTPATIENT
Start: 2021-11-04 | End: 2021-11-04

## 2021-11-04 RX ORDER — SODIUM POLYSTYRENE SULFONATE 4.1 MEQ/G
30 POWDER, FOR SUSPENSION ORAL; RECTAL ONCE
Status: COMPLETED | OUTPATIENT
Start: 2021-11-04 | End: 2021-11-04

## 2021-11-04 RX ORDER — HYDROCODONE BITARTRATE AND ACETAMINOPHEN 10; 325 MG/1; MG/1
1 TABLET ORAL ONCE
Status: COMPLETED | OUTPATIENT
Start: 2021-11-04 | End: 2021-11-05

## 2021-11-04 RX ADMIN — SODIUM CHLORIDE 125 ML/HR: 9 INJECTION, SOLUTION INTRAVENOUS at 18:13

## 2021-11-04 RX ADMIN — CALCIUM GLUCONATE 1 G: 20 INJECTION, SOLUTION INTRAVENOUS at 19:11

## 2021-11-04 RX ADMIN — HUMAN INSULIN 10 UNITS: 100 INJECTION, SOLUTION SUBCUTANEOUS at 19:09

## 2021-11-04 RX ADMIN — SODIUM BICARBONATE 50 MEQ: 84 INJECTION, SOLUTION INTRAVENOUS at 19:22

## 2021-11-04 RX ADMIN — SODIUM CHLORIDE 125 ML/HR: 9 INJECTION, SOLUTION INTRAVENOUS at 19:10

## 2021-11-04 RX ADMIN — SODIUM BICARBONATE 75 MEQ: 84 INJECTION, SOLUTION INTRAVENOUS at 23:52

## 2021-11-04 RX ADMIN — SODIUM POLYSTYRENE SULFONATE 30 G: 1 POWDER ORAL; RECTAL at 19:13

## 2021-11-04 RX ADMIN — DEXTROSE MONOHYDRATE 50 ML: 25 INJECTION, SOLUTION INTRAVENOUS at 19:07

## 2021-11-04 RX ADMIN — LACTULOSE 20 G: 10 SOLUTION ORAL at 19:13

## 2021-11-04 NOTE — ED NOTES
MEDICAL SCREENING:    Reason for Visit: abnormal labs    Patient initially seen in triage.  The patient was advised further evaluation and diagnostic testing will be needed, some of the treatment and testing will be initiated in the lobby in order to begin the process.  The patient will be returned to the waiting area for the time being and possibly be re-assessed by a subsequent ED provider.  The patient will be brought back to the treatment area in as timely manner as possible.         Suleman Fontaine PA  11/04/21 1518

## 2021-11-05 PROBLEM — R41.0 CONFUSION: Status: RESOLVED | Noted: 2019-03-16 | Resolved: 2021-11-05

## 2021-11-05 PROBLEM — R73.9 HYPERGLYCEMIA: Status: RESOLVED | Noted: 2019-03-15 | Resolved: 2021-11-05

## 2021-11-05 PROBLEM — N18.9 ACUTE KIDNEY INJURY SUPERIMPOSED ON CHRONIC KIDNEY DISEASE: Status: ACTIVE | Noted: 2021-11-05

## 2021-11-05 PROBLEM — E87.5 HYPERKALEMIA: Status: RESOLVED | Noted: 2019-07-24 | Resolved: 2021-11-05

## 2021-11-05 PROBLEM — I63.9 CVA (CEREBRAL VASCULAR ACCIDENT): Chronic | Status: ACTIVE | Noted: 2019-07-24

## 2021-11-05 PROBLEM — A41.9 SEVERE SEPSIS (HCC): Status: RESOLVED | Noted: 2018-11-22 | Resolved: 2021-11-05

## 2021-11-05 PROBLEM — N17.9 ACUTE KIDNEY INJURY SUPERIMPOSED ON CHRONIC KIDNEY DISEASE (HCC): Status: ACTIVE | Noted: 2021-11-05

## 2021-11-05 PROBLEM — R65.20 SEVERE SEPSIS (HCC): Status: RESOLVED | Noted: 2018-11-22 | Resolved: 2021-11-05

## 2021-11-05 PROBLEM — I10 HTN (HYPERTENSION): Chronic | Status: ACTIVE | Noted: 2019-07-24

## 2021-11-05 PROBLEM — F32.A DEPRESSION: Chronic | Status: ACTIVE | Noted: 2019-07-24

## 2021-11-05 PROBLEM — I50.32 CHRONIC DIASTOLIC CHF (CONGESTIVE HEART FAILURE): Chronic | Status: ACTIVE | Noted: 2019-07-24

## 2021-11-05 LAB
ALBUMIN SERPL-MCNC: 3.46 G/DL (ref 3.5–5.2)
ALBUMIN/GLOB SERPL: 1.5 G/DL
ALP SERPL-CCNC: 89 U/L (ref 39–117)
ALT SERPL W P-5'-P-CCNC: 10 U/L (ref 1–33)
AMPHET+METHAMPHET UR QL: NEGATIVE
AMPHETAMINES UR QL: NEGATIVE
ANION GAP SERPL CALCULATED.3IONS-SCNC: 13.2 MMOL/L (ref 5–15)
ANION GAP SERPL CALCULATED.3IONS-SCNC: 13.9 MMOL/L (ref 5–15)
AST SERPL-CCNC: 13 U/L (ref 1–32)
BARBITURATES UR QL SCN: NEGATIVE
BASOPHILS # BLD AUTO: 0.04 10*3/MM3 (ref 0–0.2)
BASOPHILS NFR BLD AUTO: 0.8 % (ref 0–1.5)
BENZODIAZ UR QL SCN: NEGATIVE
BILIRUB SERPL-MCNC: <0.2 MG/DL (ref 0–1.2)
BUN SERPL-MCNC: 62 MG/DL (ref 8–23)
BUN SERPL-MCNC: 65 MG/DL (ref 8–23)
BUN/CREAT SERPL: 15.3 (ref 7–25)
BUN/CREAT SERPL: 15.7 (ref 7–25)
BUPRENORPHINE SERPL-MCNC: NEGATIVE NG/ML
CALCIUM SPEC-SCNC: 7.2 MG/DL (ref 8.6–10.5)
CALCIUM SPEC-SCNC: 7.3 MG/DL (ref 8.6–10.5)
CANNABINOIDS SERPL QL: NEGATIVE
CHLORIDE SERPL-SCNC: 111 MMOL/L (ref 98–107)
CHLORIDE SERPL-SCNC: 114 MMOL/L (ref 98–107)
CO2 SERPL-SCNC: 17.8 MMOL/L (ref 22–29)
CO2 SERPL-SCNC: 18.1 MMOL/L (ref 22–29)
COCAINE UR QL: NEGATIVE
CREAT SERPL-MCNC: 3.95 MG/DL (ref 0.57–1)
CREAT SERPL-MCNC: 4.26 MG/DL (ref 0.57–1)
DEPRECATED RDW RBC AUTO: 51.4 FL (ref 37–54)
EOSINOPHIL # BLD AUTO: 0.28 10*3/MM3 (ref 0–0.4)
EOSINOPHIL NFR BLD AUTO: 5.3 % (ref 0.3–6.2)
ERYTHROCYTE [DISTWIDTH] IN BLOOD BY AUTOMATED COUNT: 14.3 % (ref 12.3–15.4)
GFR SERPL CREATININE-BSD FRML MDRD: 11 ML/MIN/1.73
GFR SERPL CREATININE-BSD FRML MDRD: 11 ML/MIN/1.73
GFR SERPL CREATININE-BSD FRML MDRD: ABNORMAL ML/MIN/{1.73_M2}
GFR SERPL CREATININE-BSD FRML MDRD: ABNORMAL ML/MIN/{1.73_M2}
GLOBULIN UR ELPH-MCNC: 2.2 GM/DL
GLUCOSE BLDC GLUCOMTR-MCNC: 145 MG/DL (ref 70–130)
GLUCOSE BLDC GLUCOMTR-MCNC: 197 MG/DL (ref 70–130)
GLUCOSE BLDC GLUCOMTR-MCNC: 198 MG/DL (ref 70–130)
GLUCOSE BLDC GLUCOMTR-MCNC: 248 MG/DL (ref 70–130)
GLUCOSE BLDC GLUCOMTR-MCNC: 59 MG/DL (ref 70–130)
GLUCOSE SERPL-MCNC: 112 MG/DL (ref 65–99)
GLUCOSE SERPL-MCNC: 62 MG/DL (ref 65–99)
HBA1C MFR BLD: 6.4 % (ref 4.8–5.6)
HCT VFR BLD AUTO: 25.6 % (ref 34–46.6)
HGB BLD-MCNC: 7.7 G/DL (ref 12–15.9)
IMM GRANULOCYTES # BLD AUTO: 0.02 10*3/MM3 (ref 0–0.05)
IMM GRANULOCYTES NFR BLD AUTO: 0.4 % (ref 0–0.5)
LYMPHOCYTES # BLD AUTO: 1.54 10*3/MM3 (ref 0.7–3.1)
LYMPHOCYTES NFR BLD AUTO: 29 % (ref 19.6–45.3)
MCH RBC QN AUTO: 30.3 PG (ref 26.6–33)
MCHC RBC AUTO-ENTMCNC: 30.1 G/DL (ref 31.5–35.7)
MCV RBC AUTO: 100.8 FL (ref 79–97)
METHADONE UR QL SCN: NEGATIVE
MONOCYTES # BLD AUTO: 0.36 10*3/MM3 (ref 0.1–0.9)
MONOCYTES NFR BLD AUTO: 6.8 % (ref 5–12)
NEUTROPHILS NFR BLD AUTO: 3.07 10*3/MM3 (ref 1.7–7)
NEUTROPHILS NFR BLD AUTO: 57.7 % (ref 42.7–76)
NRBC BLD AUTO-RTO: 0 /100 WBC (ref 0–0.2)
OPIATES UR QL: POSITIVE
OXYCODONE UR QL SCN: NEGATIVE
PCP UR QL SCN: NEGATIVE
PLATELET # BLD AUTO: 110 10*3/MM3 (ref 140–450)
PMV BLD AUTO: 11.1 FL (ref 6–12)
POTASSIUM SERPL-SCNC: 4.7 MMOL/L (ref 3.5–5.2)
POTASSIUM SERPL-SCNC: 4.9 MMOL/L (ref 3.5–5.2)
PROPOXYPH UR QL: NEGATIVE
PROT SERPL-MCNC: 5.7 G/DL (ref 6–8.5)
RBC # BLD AUTO: 2.54 10*6/MM3 (ref 3.77–5.28)
SODIUM SERPL-SCNC: 143 MMOL/L (ref 136–145)
SODIUM SERPL-SCNC: 145 MMOL/L (ref 136–145)
T4 FREE SERPL-MCNC: 0.87 NG/DL (ref 0.93–1.7)
TRICYCLICS UR QL SCN: NEGATIVE
TROPONIN T SERPL-MCNC: 0.03 NG/ML (ref 0–0.03)
TSH SERPL DL<=0.05 MIU/L-ACNC: 4.94 UIU/ML (ref 0.27–4.2)
WBC # BLD AUTO: 5.31 10*3/MM3 (ref 3.4–10.8)

## 2021-11-05 PROCEDURE — 84439 ASSAY OF FREE THYROXINE: CPT | Performed by: PHYSICIAN ASSISTANT

## 2021-11-05 PROCEDURE — 63710000001 INSULIN ASPART PER 5 UNITS: Performed by: PHYSICIAN ASSISTANT

## 2021-11-05 PROCEDURE — 25010000002 HEPARIN (PORCINE) PER 1000 UNITS: Performed by: INTERNAL MEDICINE

## 2021-11-05 PROCEDURE — 84484 ASSAY OF TROPONIN QUANT: CPT | Performed by: PHYSICIAN ASSISTANT

## 2021-11-05 PROCEDURE — 80050 GENERAL HEALTH PANEL: CPT | Performed by: PHYSICIAN ASSISTANT

## 2021-11-05 PROCEDURE — 82962 GLUCOSE BLOOD TEST: CPT

## 2021-11-05 PROCEDURE — 99223 1ST HOSP IP/OBS HIGH 75: CPT | Performed by: PHYSICIAN ASSISTANT

## 2021-11-05 PROCEDURE — 80048 BASIC METABOLIC PNL TOTAL CA: CPT | Performed by: EMERGENCY MEDICINE

## 2021-11-05 RX ORDER — PANTOPRAZOLE SODIUM 40 MG/1
40 TABLET, DELAYED RELEASE ORAL DAILY
Status: CANCELLED | OUTPATIENT
Start: 2021-11-05

## 2021-11-05 RX ORDER — HEPARIN SODIUM 5000 [USP'U]/ML
5000 INJECTION, SOLUTION INTRAVENOUS; SUBCUTANEOUS EVERY 12 HOURS SCHEDULED
Status: DISCONTINUED | OUTPATIENT
Start: 2021-11-05 | End: 2021-11-09 | Stop reason: HOSPADM

## 2021-11-05 RX ORDER — NITROGLYCERIN 0.4 MG/1
0.4 TABLET SUBLINGUAL
Status: DISCONTINUED | OUTPATIENT
Start: 2021-11-05 | End: 2021-11-09 | Stop reason: HOSPADM

## 2021-11-05 RX ORDER — CARVEDILOL 6.25 MG/1
12.5 TABLET ORAL 2 TIMES DAILY WITH MEALS
Status: DISCONTINUED | OUTPATIENT
Start: 2021-11-05 | End: 2021-11-09 | Stop reason: HOSPADM

## 2021-11-05 RX ORDER — SODIUM CHLORIDE 0.9 % (FLUSH) 0.9 %
10 SYRINGE (ML) INJECTION AS NEEDED
Status: DISCONTINUED | OUTPATIENT
Start: 2021-11-05 | End: 2021-11-09 | Stop reason: HOSPADM

## 2021-11-05 RX ORDER — BUSPIRONE HYDROCHLORIDE 10 MG/1
10 TABLET ORAL 3 TIMES DAILY
Status: CANCELLED | OUTPATIENT
Start: 2021-11-05

## 2021-11-05 RX ORDER — PANTOPRAZOLE SODIUM 40 MG/1
40 TABLET, DELAYED RELEASE ORAL
Status: DISCONTINUED | OUTPATIENT
Start: 2021-11-05 | End: 2021-11-09 | Stop reason: HOSPADM

## 2021-11-05 RX ORDER — CLONIDINE HYDROCHLORIDE 0.1 MG/1
0.1 TABLET ORAL 2 TIMES DAILY
Status: CANCELLED | OUTPATIENT
Start: 2021-11-05

## 2021-11-05 RX ORDER — CETIRIZINE HYDROCHLORIDE 10 MG/1
5 TABLET ORAL DAILY
Status: DISCONTINUED | OUTPATIENT
Start: 2021-11-05 | End: 2021-11-09 | Stop reason: HOSPADM

## 2021-11-05 RX ORDER — TRAZODONE HYDROCHLORIDE 100 MG/1
100 TABLET ORAL NIGHTLY
Status: ON HOLD | COMMUNITY
End: 2021-11-09 | Stop reason: SDUPTHER

## 2021-11-05 RX ORDER — TRAZODONE HYDROCHLORIDE 50 MG/1
100 TABLET ORAL NIGHTLY
Status: CANCELLED | OUTPATIENT
Start: 2021-11-05

## 2021-11-05 RX ORDER — ROPINIROLE 0.25 MG/1
0.5 TABLET, FILM COATED ORAL 2 TIMES DAILY
Status: DISCONTINUED | OUTPATIENT
Start: 2021-11-05 | End: 2021-11-09 | Stop reason: HOSPADM

## 2021-11-05 RX ORDER — DEXTROSE MONOHYDRATE 25 G/50ML
25 INJECTION, SOLUTION INTRAVENOUS ONCE
Status: COMPLETED | OUTPATIENT
Start: 2021-11-05 | End: 2021-11-05

## 2021-11-05 RX ORDER — SODIUM CHLORIDE 0.9 % (FLUSH) 0.9 %
10 SYRINGE (ML) INJECTION EVERY 12 HOURS SCHEDULED
Status: DISCONTINUED | OUTPATIENT
Start: 2021-11-05 | End: 2021-11-09 | Stop reason: HOSPADM

## 2021-11-05 RX ORDER — LEVOTHYROXINE SODIUM 0.03 MG/1
25 TABLET ORAL DAILY
Status: DISCONTINUED | OUTPATIENT
Start: 2021-11-05 | End: 2021-11-09 | Stop reason: HOSPADM

## 2021-11-05 RX ORDER — HYDROCODONE BITARTRATE AND ACETAMINOPHEN 10; 325 MG/1; MG/1
1 TABLET ORAL EVERY 6 HOURS PRN
Status: DISCONTINUED | OUTPATIENT
Start: 2021-11-05 | End: 2021-11-09 | Stop reason: HOSPADM

## 2021-11-05 RX ORDER — DEXTROSE MONOHYDRATE 25 G/50ML
25 INJECTION, SOLUTION INTRAVENOUS
Status: DISCONTINUED | OUTPATIENT
Start: 2021-11-05 | End: 2021-11-09 | Stop reason: HOSPADM

## 2021-11-05 RX ORDER — INSULIN GLARGINE 100 [IU]/ML
18 INJECTION, SOLUTION SUBCUTANEOUS 2 TIMES DAILY
Status: ON HOLD | COMMUNITY
End: 2021-11-09 | Stop reason: SDUPTHER

## 2021-11-05 RX ORDER — LEVOTHYROXINE SODIUM 0.05 MG/1
50 TABLET ORAL
Status: ON HOLD | COMMUNITY
End: 2022-07-02

## 2021-11-05 RX ORDER — CARVEDILOL 12.5 MG/1
12.5 TABLET ORAL 2 TIMES DAILY WITH MEALS
Status: ON HOLD | COMMUNITY
End: 2022-05-19 | Stop reason: SDUPTHER

## 2021-11-05 RX ORDER — CLONIDINE HYDROCHLORIDE 0.1 MG/1
0.1 TABLET ORAL ONCE
Status: COMPLETED | OUTPATIENT
Start: 2021-11-05 | End: 2021-11-05

## 2021-11-05 RX ORDER — TIZANIDINE 4 MG/1
4 TABLET ORAL EVERY 6 HOURS PRN
Status: DISCONTINUED | OUTPATIENT
Start: 2021-11-05 | End: 2021-11-09 | Stop reason: HOSPADM

## 2021-11-05 RX ORDER — GABAPENTIN 600 MG/1
600 TABLET ORAL 3 TIMES DAILY
Status: ON HOLD | COMMUNITY
End: 2021-11-09 | Stop reason: SDUPTHER

## 2021-11-05 RX ORDER — NICOTINE POLACRILEX 4 MG
15 LOZENGE BUCCAL
Status: DISCONTINUED | OUTPATIENT
Start: 2021-11-05 | End: 2021-11-09 | Stop reason: HOSPADM

## 2021-11-05 RX ORDER — FLUTICASONE PROPIONATE 50 MCG
2 SPRAY, SUSPENSION (ML) NASAL DAILY PRN
Status: DISCONTINUED | OUTPATIENT
Start: 2021-11-05 | End: 2021-11-09 | Stop reason: HOSPADM

## 2021-11-05 RX ORDER — FLUTICASONE PROPIONATE 50 MCG
2 SPRAY, SUSPENSION (ML) NASAL DAILY PRN
COMMUNITY

## 2021-11-05 RX ORDER — ONDANSETRON 2 MG/ML
4 INJECTION INTRAMUSCULAR; INTRAVENOUS EVERY 6 HOURS PRN
Status: DISCONTINUED | OUTPATIENT
Start: 2021-11-05 | End: 2021-11-09 | Stop reason: HOSPADM

## 2021-11-05 RX ORDER — CLOPIDOGREL BISULFATE 75 MG/1
75 TABLET ORAL DAILY
Status: DISCONTINUED | OUTPATIENT
Start: 2021-11-05 | End: 2021-11-09 | Stop reason: HOSPADM

## 2021-11-05 RX ORDER — CETIRIZINE HYDROCHLORIDE 10 MG/1
10 TABLET ORAL DAILY
Status: ON HOLD | COMMUNITY
End: 2022-07-02

## 2021-11-05 RX ORDER — CLOPIDOGREL BISULFATE 75 MG/1
75 TABLET ORAL DAILY
Status: CANCELLED | OUTPATIENT
Start: 2021-11-05

## 2021-11-05 RX ORDER — GABAPENTIN 300 MG/1
300 CAPSULE ORAL NIGHTLY
Status: DISCONTINUED | OUTPATIENT
Start: 2021-11-05 | End: 2021-11-09 | Stop reason: HOSPADM

## 2021-11-05 RX ORDER — PRAVASTATIN SODIUM 40 MG
20 TABLET ORAL DAILY
Status: DISCONTINUED | OUTPATIENT
Start: 2021-11-05 | End: 2021-11-09 | Stop reason: HOSPADM

## 2021-11-05 RX ORDER — ACETAMINOPHEN 325 MG/1
650 TABLET ORAL EVERY 6 HOURS PRN
Status: DISCONTINUED | OUTPATIENT
Start: 2021-11-05 | End: 2021-11-09 | Stop reason: HOSPADM

## 2021-11-05 RX ADMIN — CARVEDILOL 12.5 MG: 6.25 TABLET, FILM COATED ORAL at 17:16

## 2021-11-05 RX ADMIN — ROPINIROLE HYDROCHLORIDE 0.5 MG: 0.25 TABLET, FILM COATED ORAL at 21:29

## 2021-11-05 RX ADMIN — SODIUM CHLORIDE, PRESERVATIVE FREE 10 ML: 5 INJECTION INTRAVENOUS at 21:29

## 2021-11-05 RX ADMIN — CLONIDINE HYDROCHLORIDE 0.1 MG: 0.1 TABLET ORAL at 00:32

## 2021-11-05 RX ADMIN — HYDROCODONE BITARTRATE AND ACETAMINOPHEN 1 TABLET: 10; 325 TABLET ORAL at 15:34

## 2021-11-05 RX ADMIN — HYDROCODONE BITARTRATE AND ACETAMINOPHEN 1 TABLET: 10; 325 TABLET ORAL at 00:09

## 2021-11-05 RX ADMIN — CETIRIZINE HYDROCHLORIDE 5 MG: 10 TABLET, FILM COATED ORAL at 15:37

## 2021-11-05 RX ADMIN — PANTOPRAZOLE SODIUM 40 MG: 40 TABLET, DELAYED RELEASE ORAL at 08:53

## 2021-11-05 RX ADMIN — CLOPIDOGREL 75 MG: 75 TABLET, FILM COATED ORAL at 15:38

## 2021-11-05 RX ADMIN — LEVOTHYROXINE SODIUM 25 MCG: 25 TABLET ORAL at 15:37

## 2021-11-05 RX ADMIN — HEPARIN SODIUM 5000 UNITS: 5000 INJECTION INTRAVENOUS; SUBCUTANEOUS at 08:53

## 2021-11-05 RX ADMIN — HEPARIN SODIUM 5000 UNITS: 5000 INJECTION INTRAVENOUS; SUBCUTANEOUS at 21:29

## 2021-11-05 RX ADMIN — DEXTROSE MONOHYDRATE 25 G: 25 INJECTION, SOLUTION INTRAVENOUS at 00:26

## 2021-11-05 RX ADMIN — INSULIN ASPART 2 UNITS: 100 INJECTION, SOLUTION INTRAVENOUS; SUBCUTANEOUS at 17:15

## 2021-11-05 RX ADMIN — TRAZODONE HYDROCHLORIDE 100 MG: 50 TABLET ORAL at 00:09

## 2021-11-05 RX ADMIN — PRAVASTATIN SODIUM 20 MG: 40 TABLET ORAL at 15:38

## 2021-11-05 RX ADMIN — TRAZODONE HYDROCHLORIDE 100 MG: 50 TABLET ORAL at 21:29

## 2021-11-05 RX ADMIN — INSULIN ASPART 3 UNITS: 100 INJECTION, SOLUTION INTRAVENOUS; SUBCUTANEOUS at 12:11

## 2021-11-05 RX ADMIN — SODIUM BICARBONATE 75 MEQ: 84 INJECTION, SOLUTION INTRAVENOUS at 17:16

## 2021-11-05 RX ADMIN — GABAPENTIN 300 MG: 300 CAPSULE ORAL at 21:29

## 2021-11-05 NOTE — CASE MANAGEMENT/SOCIAL WORK
Discharge Planning Assessment   Jose Alfredo     Patient Name: Reny Elena  MRN: [id number]  Today's Date: 11/5/2021    Admit Date: 11/4/2021     Discharge Needs Assessment     Row Name 11/05/21 1030       Living Environment    Lives With other (see comments)    Unique Family Situation Cliff Leiva    Current Living Arrangements home/apartment/condo    Primary Care Provided by self; other (see comments)    Provides Primary Care For no one    Family Caregiver if Needed other (see comments)    Quality of Family Relationships helpful; involved; supportive    Able to Return to Prior Arrangements yes       Resource/Environmental Concerns    Resource/Environmental Concerns none    Transportation Concerns car, none       Transition Planning    Patient/Family Anticipates Transition to home with family    Patient/Family Anticipated Services at Transition none    Transportation Anticipated family or friend will provide       Discharge Needs Assessment    Equipment Currently Used at Home commode; walker, rolling; shower chair; glucometer; cane, straight; bath bench    Concerns to be Addressed no discharge needs identified    Anticipated Changes Related to Illness none    Equipment Needed After Discharge none               Discharge Plan     Row Name 11/05/21 1031       Plan    Plan Pt lives at home with Cliff Leiva. Pt does not utilize home health services at this time. Pt has bath bench, cane, bedside commode, glucometer, shower chair, and rolling walker via unknown company. PCP is Susan Stephens. Pt does not have a POA or living will on file. Cliff to provide transportation home at discharge. SS to follow and assist.    Patient/Family in Agreement with Plan yes               Demographic Summary     Row Name 11/05/21 1030       General Information    Referral Source physician    Reason for Consult --  admission screening              DELROY Son

## 2021-11-05 NOTE — PAYOR COMM NOTE
"CONTACT:  Adelina Soriano, GLORIA    Utilization Management Dept.   [facility]  [address] [place] [address]    Phone:[phone]  Fax: [phone]    REQUEST FOR INPATIENT AUTHORIZATION  REF # [id number]  ICD 10:N17.9, N18.9 , E87.5  ATTENDING MD:    Laura PERRY  NPI:[id number]  FACILITY NPI: [id number]  ADM DATE: 11/4/21      Reny Merino (61 y.o. Female)             Date of Birth Social Security Number Address Home Phone MRN    1960  [address] [place] [address] [id number] [phone] [id number]    Yazdanism Marital Status             Gnosticist        Admission Date Admission Type Admitting Provider Attending Provider Department, Room/Bed    11/4/21 Emergency Berenice Hicks DO Perkins, Jimmye S, MD [place] [address]/    Discharge Date Discharge Disposition Discharge Destination                         Attending Provider: Laura Perry MD    Allergies: Penicillins, Codeine, Sulfa Antibiotics    Isolation: None   Infection: None   Code Status: CPR   Advance Care Planning Activity    Ht: 165.1 cm (65\")   Wt: 57.7 kg (127 lb 3.2 oz)    Admission Cmt: None   Principal Problem: Acute kidney injury superimposed on chronic kidney disease (HCC) [N17.9,N18.9]                 Active Insurance as of 11/4/2021     Primary Coverage     Payor Plan Insurance Group Employer/Plan Group    [employer] [place] [employer] [place]ICA[place]      Payor Plan Address Payor Plan Phone Number Payor Plan Fax Number Effective Dates    [address] [address] [phone]  4/12/2017 - None Entered    [facility] [id number]       Subscriber Name Subscriber Birth Date Member ID       RENY MERINO 1960 [id number]                 Emergency Contacts      (Rel.) Home Phone Work Phone Mobile Phone    Liza Oliva (Daughter) -- -- [phone]    Cliff Leiva (Significant Other) -- -- [phone]               History & Physical      O'Marta Qiu PA at 11/05/21 0602     Attestation " signed by Berenice Hicks DO at 21 0805    I have reviewed this documentation and agree.                         [facility]  HISTORY & PHYSICAL    Patient Identification:  Name:  Reny Elena  Age:  61 y.o.  Sex:  female  :  1960  MRN:  [id number]   Visit Number:  [id number]  Admit Date: 2021   Primary Care Physician:  Susan Stephens APRN     Subjective     Chief complaint:   Chief Complaint   Patient presents with   • Abnormal Lab     History of presenting illness:   Patient is a 61 y.o. 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  Patient states that she was seen by her primary care yesterday for routine follow-up.  Patient states she was contacted and encouraged to come to the ED for further evaluation due to increased creatinine and potassium of 6.0.  Patient denies any complaints.  She states she is been at her baseline state of health.  She denies any abdominal pain or flank pain.  She denies any decreased appetite, diarrhea, nausea, or vomiting.  Denies decreased oral intake.  She denies any urinary symptoms, no hematuria or dysuria, no decreased urine output.  She denies any chest pain or dyspnea.  Denies any headache or dizziness.  She denies any blood in stool.  No fevers or chills.  Patient states she does follow with nephrology, reports she was last seen in May 2021.  She is not on dialysis.    Upon arrival to the ED, vitals were temperature 97.1, HR 75, RR 18, /43 that did increase to 181/71, and oxygen saturation 98% on room air. Troponin T 0.034 with repeat at 0.027. ABG with pH 7.221, pCO2 48.2, pO2  61.7, HCO3 19.8, and oxygen saturation 91% on room air. CMP with glucose 129, potassium 6.0, CO2 17.6, anion gap 16.4, creatinine 4.57, BUN 65, eGFR 10. Mag 2.0. CBC with hemoglobin 8.0, hematocrit 30.7, MCV 99.2, platelets 114. Urinalysis with trace blood, trace LE, 3+ protein, 6-12 WBC, 1+ bacteria. COVID-19/influenza screening negative. CXR with no evidence of acute cardiopulmonary disease. Known ED medication administration: 30 g PO kayexalate, 50 mEq IV sodium bicarb, 20 g PO lactulose, 10 units regular insulin IV, 50 mL D50W (25g Dextrose) x 2, 1 g IV calcium gluconate, 0.1 mg PO clonidine,  acetaminophen/hydrocodone x 1, and 100 mg PO trazodone. Patient was also initially started on NS at 125 ml/hr and was switched over to 1/2 NS with 75 mEq Bicarbonate at 125 ml/hr per nephrology recommendations to ED provider. It is of note that the patient did become hypoglycemia in ED with blood glucose 53 after hyperkalemia treatment, but did improve with D50W injection.     Patient has been admitted to the medical surgical floor for further evaluation and treatment.     Present during exam: N/A  ---------------------------------------------------------------------------------------------------------------------   Review of Systems   Constitutional: Negative for activity change, appetite change, chills, diaphoresis, fatigue and fever.   HENT: Negative for congestion and sore throat.    Eyes: Negative for discharge and visual disturbance.   Respiratory: Negative for cough, chest tightness, shortness of breath and wheezing.    Cardiovascular: Negative for chest pain, palpitations and leg swelling.   Gastrointestinal: Negative for abdominal pain, constipation, diarrhea, nausea and vomiting.   Genitourinary: Negative for decreased urine volume, dysuria, frequency, hematuria and urgency.   Musculoskeletal: Negative for arthralgias, gait problem and myalgias.   Skin: Negative for color change and wound.   Neurological:  Negative for dizziness, weakness, light-headedness and headaches.   Psychiatric/Behavioral: Negative for confusion. The patient is not nervous/anxious.       ---------------------------------------------------------------------------------------------------------------------   Past Medical History:   Diagnosis Date   • Arthritis    • Closed fracture of right fibula and tibia 2018   • Depression    • Diabetic ulcer of left foot associated with type 2 diabetes mellitus (CMS/[facility]) 2017   • Diastolic dysfunction    • Essential hypertension    • Hyperlipidemia    • Iron deficiency anemia 2018   • PAD (peripheral artery disease) (CMS/[facility])    • Type 2 diabetes mellitus with hyperglycemia, with long-term current use of insulin (CMS/[facility]) 2018     Past Surgical History:   Procedure Laterality Date   • BACK SURGERY      Post spinal diskectomy, osteophytectomy in one lumbar interspace   • CATARACT EXTRACTION      Left    •  SECTION     • DENTAL PROCEDURE     • HYSTERECTOMY     • INCISION AND DRAINAGE LEG Left 4/15/2017    Procedure: INCISION AND DRAINAGE LOWER EXTREMITY;  Surgeon: Basilio Morris MD;  Location: [facility];  Service:    • INCISION AND DRAINAGE LEG Left 2017    Procedure: Irrigation and Debridment abcess diabetic wound left foot with deep culture;  Surgeon: Basilio Morris MD;  Location: [facility] OR;  Service:    • KNEE ARTHROSCOPY Right    • ORIF TIBIA FRACTURE Right 2018    Procedure: TIBIAL  FRACTURE OPEN REDUCTION INTERNAL FIXATION;  Surgeon: Jung Barragan MD;  Location: [facility] OR;  Service: Orthopedics   • TOE AMPUTATION Right     5th   • TUBAL ABDOMINAL LIGATION       Family History   Problem Relation Age of Onset   • Heart disease Mother    • Cancer Mother    • COPD Mother    • Diabetes Other    • Depression Other      Social History     Socioeconomic History   • Marital status:    Tobacco Use   • Smoking status: Never Smoker   • Smokeless tobacco: Never Used    Substance and Sexual Activity   • Alcohol use: No   • Drug use: No   • Sexual activity: Defer     ---------------------------------------------------------------------------------------------------------------------   Allergies:  Penicillins, Codeine, and Sulfa antibiotics  ---------------------------------------------------------------------------------------------------------------------   Medications below are reported home medications pulling from within the system; at this time, these medications have not been reconciled unless otherwise specified and are in the verification process for further verifcation as current home medications.    Prior to Admission Medications     Prescriptions Last Dose Informant Patient Reported? Taking?    busPIRone (BUSPAR) 10 MG tablet  Pharmacy Yes No    Take 1 tablet by mouth 3 (Three) Times a Day.    Cephalexin 500 MG tablet  Pharmacy No No    Take 500 mg by mouth Daily.    CloNIDine (CATAPRES) 0.1 MG tablet  Pharmacy Yes No    Take 0.1 mg by mouth Every 6 (Six) Hours As Needed for High Blood Pressure (greater than 160/90).    clopidogrel (PLAVIX) 75 MG tablet  Pharmacy No No    Take 1 tablet by mouth Daily.    escitalopram (LEXAPRO) 10 MG tablet  Pharmacy Yes No    Take 10 mg by mouth Daily.    HYDROcodone-acetaminophen (NORCO)  MG per tablet  [facility] Yes No    Take 1 tablet by mouth Every 6 (Six) Hours As Needed for Mild Pain . Pharmacy directions states every 4 to 6 hours prn pain.     Insulin Glargine (BASAGLAR KWIKPEN) 100 UNIT/ML injection pen   No No    Inject 12 Units under the skin into the appropriate area as directed Daily.    insulin lispro (ADMELOG) 100 UNIT/ML injection  Pharmacy Yes No    Inject 1-5 Units under the skin into the appropriate area as directed 3 (Three) Times a Day Before Meals.    levETIRAcetam (KEPPRA) 750 MG tablet  Pharmacy No No    Take 1 tablet by mouth Every 12 (Twelve) Hours.    lisinopril (PRINIVIL,ZESTRIL) 40 MG tablet  Pharmacy No  No    Take 1 tablet by mouth Daily.    ondansetron ODT (ZOFRAN-ODT) 4 MG disintegrating tablet   No No    Place 1 tablet on the tongue Every 6 (Six) Hours As Needed for Vomiting.    pantoprazole (PROTONIX) 40 MG EC tablet  Pharmacy Yes No    Take 40 mg by mouth Daily.    pravastatin (PRAVACHOL) 20 MG tablet  Pharmacy Yes No    Take 20 mg by mouth Daily.    rOPINIRole (REQUIP) 0.5 MG tablet  Pharmacy Yes No    Take 0.5 mg by mouth 2 (Two) Times a Day. Take 1 hour before bedtime.    temazepam (RESTORIL) 30 MG capsule  Pharmacy Yes No    Take 30 mg by mouth Every Night.    tiZANidine (ZANAFLEX) 4 MG tablet  Pharmacy Yes No    Take 4 mg by mouth Every 6 (Six) Hours As Needed for Muscle Spasms.    vitamin D (ERGOCALCIFEROL) 05887 units capsule capsule  Pharmacy Yes No    Take 50,000 Units by mouth 1 (One) Time Per Week. Prior to [facility] Admission, Patient was on: takes on wednesdays        ---------------------------------------------------------------------------------------------------------------------    Objective     Hospital Scheduled Meds:  traZODone, 100 mg, Oral, Nightly      Pharmacy Consult,   sodium bicarbonate drip (greater than 75 mEq/bag), 75 mEq, Last Rate: 75 mEq (11/04/21 2352)  sodium chloride, 125 mL/hr, Last Rate: Stopped (11/04/21 2352)      Current listed hospital scheduled medications may not yet reflect those currently placed in orders that are signed and held, awaiting patient's arrival to floor/unit.    ---------------------------------------------------------------------------------------------------------------------   Vital Signs:  Temp:  [97.1 °F (36.2 °C)-97.3 °F (36.3 °C)] 97.3 °F (36.3 °C)  Heart Rate:  [75-89] 86  Resp:  [17-18] 17  BP: ()/(43-81) 117/58  Mean Arterial Pressure (Non-Invasive) for the past 24 hrs (Last 3 readings):   Noninvasive MAP (mmHg)   11/05/21 0517 84   11/05/21 0503 79   11/05/21 0447 79     SpO2 Percentage    11/05/21 0447 11/05/21 0503 11/05/21 0517    SpO2: 93% 97% 98%     SpO2:  [90 %-100 %] 98 %  on   ;   Device (Oxygen Therapy): room air    Body mass index is 22.13 kg/m².  Wt Readings from Last 3 Encounters:   11/04/21 60.3 kg (133 lb)   06/23/20 53.1 kg (117 lb)   01/23/20 54.9 kg (121 lb)       ---------------------------------------------------------------------------------------------------------------------   Physical Exam:  Physical Exam  Nursing note reviewed.   Constitutional:       General: She is awake. She is not in acute distress.     Appearance: She is well-developed. She is not toxic-appearing.      Comments: Pleasant, sitting up in bed, no acute distress noted.  On room air.  No family present at bedside.   HENT:      Head: Normocephalic and atraumatic.      Mouth/Throat:      Mouth: Mucous membranes are moist.   Eyes:      Conjunctiva/sclera: Conjunctivae normal.      Pupils: Pupils are equal, round, and reactive to light.   Cardiovascular:      Rate and Rhythm: Normal rate and regular rhythm.      Pulses:           Dorsalis pedis pulses are 2+ on the right side and 2+ on the left side.      Heart sounds: Normal heart sounds. No murmur heard.  No friction rub. No gallop.    Pulmonary:      Effort: Pulmonary effort is normal. No tachypnea, accessory muscle usage or respiratory distress.      Breath sounds: Normal breath sounds and air entry. No wheezing, rhonchi or rales.      Comments: On room air, speaks in full sentences without dyspnea.  Abdominal:      General: Bowel sounds are normal. There is no distension.      Palpations: Abdomen is soft.      Tenderness: There is no abdominal tenderness. There is no right CVA tenderness, left CVA tenderness, guarding or rebound.   Genitourinary:     Comments: No tate catheter in place.  Musculoskeletal:      Cervical back: Neck supple.      Right lower leg: No edema.      Left lower leg: No edema.   Skin:     General: Skin is warm and dry.      Capillary Refill: Capillary refill takes less than 2  seconds.   Neurological:      General: No focal deficit present.      Mental Status: She is alert and oriented to person, place, and time.      GCS: GCS eye subscore is 4. GCS verbal subscore is 5. GCS motor subscore is 6.      Cranial Nerves: Cranial nerves are intact.      Sensory: Sensation is intact.      Motor: Motor function is intact.      Comments: Awake and alert. Follows commands. Answers questions appropriately. Moves all extremities equally. Strength and sensation intact. No focal neuro deficit on exam.   Psychiatric:         Attention and Perception: Attention normal.         Mood and Affect: Mood normal.         Speech: Speech normal.         Behavior: Behavior is cooperative.       ---------------------------------------------------------------------------------------------------------------------  EKG: See cardiology read as per below     Telemetry:    Patient not currently on telemetry monitoring, review once available.    I have personally reviewed the EKG/Telemetry strip  ---------------------------------------------------------------------------------------------------------------------   Results from last 7 days   Lab Units 11/04/21  2220 11/04/21  1523   TROPONIN T ng/mL 0.027 0.034*         Results from last 7 days   Lab Units 11/04/21  2217   PH, ARTERIAL pH units 7.221*   PO2 ART mm Hg 61.7*   PCO2, ARTERIAL mm Hg 48.2*   HCO3 ART mmol/L 19.8*     Results from last 7 days   Lab Units 11/04/21  1523   WBC 10*3/mm3 4.45   HEMOGLOBIN g/dL 8.0*   HEMATOCRIT % 25.9*   MCV fL 99.2*   MCHC g/dL 30.9*   PLATELETS 10*3/mm3 114*     Results from last 7 days   Lab Units 11/05/21  0318 11/04/21  2220 11/04/21  1523 11/04/21  1233 11/04/21  1233   SODIUM mmol/L 143 146* 141   < > 140   POTASSIUM mmol/L 4.9 5.1 6.0*   < > 6.0*   MAGNESIUM mg/dL  --   --  2.0  --   --    CHLORIDE mmol/L 111* 112* 107   < > 111*   CO2 mmol/L 18.1* 19.9* 17.6*   < > 16.0*   BUN mg/dL 65* 64* 65*   < > 64*   CREATININE mg/dL  4.26* 4.56* 4.57*   < > 4.29*   EGFR IF NONAFRICN AM mL/min/1.73 11* 10* 10*   < > 10*   CALCIUM mg/dL 7.2* 7.6* 7.6*   < > 7.5*   GLUCOSE mg/dL 112* 53* 129*   < > 123*   ALBUMIN g/dL  --   --  4.02  --  3.90   BILIRUBIN mg/dL  --   --  0.2  --  <0.2   ALK PHOS U/L  --   --  101  --  94   AST (SGOT) U/L  --   --  14  --  12   ALT (SGPT) U/L  --   --  10  --  10    < > = values in this interval not displayed.   Estimated Creatinine Clearance: 12.9 mL/min (A) (by C-G formula based on SCr of 4.26 mg/dL (H)).    Glucose   Date/Time Value Ref Range Status   11/04/2021 1726 152 (H) 70 - 130 mg/dL Final     Comment:     Meter: [id number] : [id number] leslee varela     Lab Results   Component Value Date    HGBA1C 9.20 (H) 07/24/2019     Lab Results   Component Value Date    TSH 6.060 (H) 08/05/2019    FREET4 0.83 (L) 08/06/2019     Microbiology Results (last 10 days)     Procedure Component Value - Date/Time    COVID-19 and FLU A/B PCR - Swab, Nasopharynx [[id number]]  (Normal) Collected: 11/04/21 1915    Lab Status: Final result Specimen: Swab from Nasopharynx Updated: 11/04/21 2009     COVID19 Not Detected     Influenza A PCR Not Detected     Influenza B PCR Not Detected    Narrative:      Fact sheet for providers: https://www.fda.gov/media/622098/download    Fact sheet for patients: https://www.fda.gov/media/268133/download    Test performed by PCR.         Pain Management Panel     Pain Management Panel Latest Ref Rng & Units 4/2/2021 4/2/2021    CREATININE UR mg/dL 56.7 56.7    AMPHETAMINES SCREEN, URINE Negative - -    BARBITURATES SCREEN Negative - -    BENZODIAZEPINE SCREEN, URINE Negative - -    BUPRENORPHINEUR Negative - -    COCAINE SCREEN, URINE Negative - -    METHADONE SCREEN, URINE Negative - -        I have personally reviewed the above laboratory results.   ---------------------------------------------------------------------------------------------------------------------  Imaging Results (Last 7  Days)     Procedure Component Value Units Date/Time    XR Chest 1 View [[id number]] Collected: 11/04/21 1957     Updated: 11/04/21 1959    Narrative:      FINDINGS:  Portable AP view(s) of the chest.  The heart and mediastinal contours are normal. The lungs are clear. No pneumothorax or pleural effusion.      Impression:      No acute cardiopulmonary findings.    Signer Name: GWENDOLYN Ventura MD   Signed: 11/4/2021 7:57 PM   Workstation Name: [facility]    [facility] have personally reviewed the above radiology results.     Last Echocardiogram:  Results for orders placed during the hospital encounter of 03/15/19    Adult Transthoracic Echo Complete W/ Cont if Necessary Per Protocol    Interpretation Summary  · Normal left ventricular cavity size and wall thickness noted.  · Left ventricular diastolic dysfunction (grade I) consistent with impaired relaxation.  · Left ventricular systolic function is mildly decreased.  · Estimated EF appears to be in the range of 46 - 50%.  · The aortic valve is abnormal in structure. The valve exhibits sclerosis. Mild aortic valve regurgitation is present. No aortic valve stenosis is present.  · The mitral valve is normal in structure. Mild mitral valve regurgitation is present. No significant mitral valve stenosis is present.  · The tricuspid valve is normal. No evidence of tricuspid valve stenosis is present. No tricuspid valve regurgitation is present.  · There is no evidence of pericardial effusion.  · Comments: LV systolic function has decreased since 11/22/18(LVEF decreased from 56-60% then to 46-50% now)    ---------------------------------------------------------------------------------------------------------------------    Assessment & Plan      -Acute on chronic stage IIIb-IV CKD   -Elevated anion gap metabolic acidosis   Baseline creatinine previously 1.0-1.2 in 2019, 1.7-2 2020, only lab from 2021 available to review is from 4/2021 with  creatinine of 2.47. Creatinine on presentation today 4.5.  UA slightly abnormal with bacteria, urine culture pending   3+ proteinuria noted  Obtain urine studies/chemistries   As patient is not having any abdominal pain or back pain/flank pain, will hold off on any further imaging.  Will defer imaging to nephrology once evaluated.  Nephrology consulted, input/assistance is much appreciated   Cont IV fluids with 1/2 NS with 75 mEq 125 cc/hr per nephro recommendations in ED   Treat hyperkalemia as needed, currently improved.   Monitor urine output and renal function closely  Avoid nephrotoxins as much as possible   Repeat chemistry panel in AM    -Hyperkalemia  · Initial potassium 6.0, now improved to 4.9 after treatment in ED with kayexalate, lactulose, calcium gluconate, insulin/dextrose as well as bicarb/fluids   · Closely monitor with serial chemistry panels   · Monitor closely for arrhythmia on telemetry   · EKG stable   · Treat HORACE as outline above, patient reports bowel movement after Kayexalate/lactulose    -Mild troponinemia   · On down trend  · Likely d/t renal dysfunction   · Patient denies chest pain   · EKG without acute ischemic changes   · Cont telemetry monitoring   · Cont IV fluids   · BP management   · Obtain third troponin T     -Essential hypertension  • BP labile in ED. Initially controlled with increase in SBP to 180s. Home clonidine dosage was given, SBP now in the 90s-100s after treatment in ED   • Plan to resume home antihypertensive regimen once reconciled per pharmacy with appropriate holding parameters to prevent hypotension and/or bradycardia   • Closely monitor BP per hospital protocol, titrate medications as necessary     -Hyperlipidemia  · AM lipid panel   · Cont home statin     -Combined systolic (EF 46-50%) and diastolic (grade I) CHF, compensated  -Mild mitral valve regurgitation   -Mild aortic valve regurgitation   · Appears grossly compensated   · TTE from 3/2019 reviewed    · Monitor volume status closely, IV fluids on board for HORACE   · Strict Is and Os, daily weights     -Type II diabetes mellitus, insulin dependent   • Obtain HgbA1C  • Review home medication regimen once reconciled per pharmacy   • Hold home oral DM medications for now.  • Hypoglycemic with BG 57 in ED after regular insulin IV for hyperkalemia, stabilized with D5W  • Start low dose SSI to utilize as needed, titrate dosage as necessary   • Closely monitor blood glucose levels with accuchecks QAC and QHS  • Hypoglycemia protocol in place should it be necessary    -Hypothyroidism  · Obtain TSH and free T4  · Plan resume home levothyroxine dosage when reconciled per pharmacy, adjust dosage if necessary depending on laboratory results    -Macrocytic anemia   -Mild thrombocytopenia   · Patient appears to have chronic anemia, likely AOCD   · However, hemoglobin is slightly decreased from her baseline   · Plts slightly decreased, per review of chart plts low intermittently   · No active bleeding noted or reported   · Obtain PT/INR, aPTT   · Obtain iron profile and ferritin levels   · Obtain vitamin B12, folate, and vitamin D levels. Plan to supplement if patient is found to be deficient.   · Closely monitor H&H, transfuse if necessary   · Repeat CBC in AM     -Seizure disorder   · Seizure precautions   · Resume home medication regimen once reconciled per pharmacy   · Per review of chart, patient experiences non convulsive seizures with associated expressive aphasia     -GERD  • PPI    -Anxiety   -Depression   · Supportive care   · Resume home medication regimen once reconciled per pharmacy     -F/E/N  • IV fluids per nephrology. Replace electrolytes per protocol as necessary. Consistent carbohydrate/renal diet.     ---------------------------------------------------  DVT Prophylaxis: SQ heparin   GI Prophylaxis: PPI  Activity: Up with assistance as tolerated    The patient is considered to be a high risk patient due to:  HORACE on CKD, metabolic acidosis, hyperkalemia, HTN, CHF, anemia, seizure disorder     INPATIENT status due to the need for care which can only be reasonably provided in an hospital setting such as aggressive/expedited ancillary services and/or consultation services, the necessity for IV medications, close physician monitoring and/or the possible need for procedures.  In such, I feel patient’s risk for adverse outcomes and need for care warrant INPATIENT evaluation and predict the patient’s care encounter to likely last beyond 2 midnights.    Code Status: FULL CODE     Disposition/Discharge planning: Plans on home once medically stable, pending clinical course     I have discussed the patient's assessment and plan with the patient, nursing staff, and attending physician DO Marta Beltran PA-C  Hospitalist Service -- [facility]   Pager: [phone]    11/05/21  06:02 EDT    Attending Physician: Berenice Hicks DO       ---------------------------------------------------------------------------------------------------------------------          Electronically signed by Berenice Hicks DO at 11/05/21 0805          Emergency Department Notes      Suleman Fontaine PA at 11/04/21 3648                 MEDICAL SCREENING:    Reason for Visit: abnormal labs    Patient initially seen in triage.  The patient was advised further evaluation and diagnostic testing will be needed, some of the treatment and testing will be initiated in the lobby in order to begin the process.  The patient will be returned to the waiting area for the time being and possibly be re-assessed by a subsequent ED provider.  The patient will be brought back to the treatment area in as timely manner as possible.         Suleman Fontaine PA  11/04/21 2957      Electronically signed by Suleman Fontaine PA at 11/04/21 9580     Clay Maradiaga MD at 11/04/21 6020          Subjective   61-year-old female with chronic  "kidney disease went to her PCP today, had routine labs drawn, then was sent here due to worsened creatinine and a potassium of 6.0. She denies any symptoms of any kind. She states that she feels \"great\". She has had no fever, chills, headaches, neck pain, back pain, chest pain, shortness of breath, abdominal pain, vomiting, diarrhea, hematemesis, hematochezia or melena, hematuria, syncope or near syncope, focal numbness or weakness, other symptoms or other complaints. She states her appetite has been good and she has been eating and drinking well.          Review of Systems   Constitutional: Negative for chills, diaphoresis and fever.   HENT: Negative for ear pain, sore throat and trouble swallowing.    Eyes: Negative for photophobia and pain.   Respiratory: Negative for shortness of breath, wheezing and stridor.    Cardiovascular: Negative for chest pain and palpitations.   Gastrointestinal: Negative for abdominal distention, abdominal pain, blood in stool, diarrhea, nausea and vomiting.   Endocrine: Negative for polydipsia and polyphagia.   Genitourinary: Negative for difficulty urinating and flank pain.   Musculoskeletal: Negative for back pain, neck pain and neck stiffness.   Skin: Negative for color change and pallor.   Neurological: Negative for seizures, syncope and speech difficulty.   Psychiatric/Behavioral: Negative for confusion.   All other systems reviewed and are negative.      Past Medical History:   Diagnosis Date   • Arthritis    • Closed fracture of right fibula and tibia 12/25/2018   • Depression    • Diabetic ulcer of left foot associated with type 2 diabetes mellitus ([facility]) 6/23/2017   • Diastolic dysfunction    • Essential hypertension    • Hyperlipidemia    • Iron deficiency anemia 12/30/2018   • PAD (peripheral artery disease) ([facility])    • Type 2 diabetes mellitus with hyperglycemia, with long-term current use of insulin ([facility]) 12/30/2018       Allergies   Allergen Reactions   • Penicillins Other " (See Comments)     Patient has received cefazolin, ceftriaxone and cefepime during past admissions.   • Codeine Rash     Pt tolerates Norco @ home   • Sulfa Antibiotics Rash     NAUSEA TOO       Past Surgical History:   Procedure Laterality Date   • BACK SURGERY      Post spinal diskectomy, osteophytectomy in one lumbar interspace   • CATARACT EXTRACTION      Left    •  SECTION     • DENTAL PROCEDURE     • HYSTERECTOMY     • INCISION AND DRAINAGE LEG Left 4/15/2017    Procedure: INCISION AND DRAINAGE LOWER EXTREMITY;  Surgeon: Basilio Morris MD;  Location: [facility] OR;  Service:    • INCISION AND DRAINAGE LEG Left 2017    Procedure: Irrigation and Debridment abcess diabetic wound left foot with deep culture;  Surgeon: Basilio Morris MD;  Location: [facility] OR;  Service:    • KNEE ARTHROSCOPY Right    • ORIF TIBIA FRACTURE Right 2018    Procedure: TIBIAL  FRACTURE OPEN REDUCTION INTERNAL FIXATION;  Surgeon: Jung Barragan MD;  Location: [facility] OR;  Service: Orthopedics   • TOE AMPUTATION Right     5th   • TUBAL ABDOMINAL LIGATION         Family History   Problem Relation Age of Onset   • Heart disease Mother    • Cancer Mother    • COPD Mother    • Diabetes Other    • Depression Other        Social History     Socioeconomic History   • Marital status:    Tobacco Use   • Smoking status: Never Smoker   • Smokeless tobacco: Never Used   Substance and Sexual Activity   • Alcohol use: No   • Drug use: No   • Sexual activity: Defer           Objective   Physical Exam  Vitals and nursing note reviewed.   Constitutional:       General: She is not in acute distress.     Appearance: Normal appearance. She is well-developed. She is not toxic-appearing or diaphoretic.   HENT:      Head: Normocephalic and atraumatic.   Eyes:      General: No scleral icterus.     Pupils: Pupils are equal, round, and reactive to light.   Neck:      Trachea: No tracheal deviation.   Cardiovascular:      Rate and Rhythm:  Normal rate and regular rhythm.   Pulmonary:      Effort: Pulmonary effort is normal. No respiratory distress.      Breath sounds: Normal breath sounds.   Chest:      Chest wall: No tenderness.   Abdominal:      General: Bowel sounds are normal.      Palpations: Abdomen is soft.      Tenderness: There is no abdominal tenderness. There is no guarding or rebound.   Musculoskeletal:         General: No tenderness. Normal range of motion.      Cervical back: Normal range of motion and neck supple. No rigidity or tenderness.      Right lower leg: No edema.      Left lower leg: No edema.   Skin:     General: Skin is warm and dry.      Capillary Refill: Capillary refill takes less than 2 seconds.      Coloration: Skin is not pale.   Neurological:      General: No focal deficit present.      Mental Status: She is alert and oriented to person, place, and time.      GCS: GCS eye subscore is 4. GCS verbal subscore is 5. GCS motor subscore is 6.      Motor: No abnormal muscle tone.   Psychiatric:         Mood and Affect: Mood normal.         Behavior: Behavior normal.         Procedures          ED Course  ED Course as of 11/05/21 0616   Thu Nov 04, 2021   1735 EKG at 1527 shows sinus rhythm, rate 72.  TX interval 158, QRS duration 94, QTc 451 ms.  No apparent acute ischemia.  No evidence for STEMI. [CM]   5240 I discussed the case with Dr. Sandy earlier.  He advised venous blood gas, if pH less than 7.36 change IV fluids to half-normal saline with 75 mEq of bicarb at 125 cc an hour, discontinue lisinopril.  He advises to put in a consult for his partner Dr. Arroyo to see in the morning.  I have notified Dr. Hicks of the patient, awaiting her return call. [CM]   Fri Nov 05, 2021   0050 Dr. Hicks advises repeat basic metabolic panel at 3 AM, needs patient's blood pressure to be low, give clonidine. The only beds available upstairs [facility] with telemetry. Case discussed with and care endorsed to Dr. Darnell at end of  shift.  Electronically signed by Yoan Santos MD, 11/05/21, 12:53 AM EDT.   [CM]      ED Course User Index  [CM] Yoan Santos MD                                           MDM  Number of Diagnoses or Management Options  Acute kidney injury superimposed on chronic kidney disease (HCC): established and worsening     Amount and/or Complexity of Data Reviewed  Clinical lab tests: reviewed and ordered  Tests in the radiology section of CPT®: reviewed and ordered  Tests in the medicine section of CPT®: reviewed and ordered  Review and summarize past medical records: yes  Discuss the patient with other providers: yes  Independent visualization of images, tracings, or specimens: yes    Risk of Complications, Morbidity, and/or Mortality  Presenting problems: moderate  Diagnostic procedures: moderate  Management options: moderate    Patient Progress  Patient progress: stable      Final diagnoses:   Acute kidney injury superimposed on chronic kidney disease (HCC)       ED Disposition  ED Disposition     ED Disposition Condition Comment    Decision to Admit  Level of Care: Medical Telemetry [23]   Diagnosis: Acute kidney injury superimposed on chronic kidney disease (HCC) [[id number]]   Admitting Physician: LESLIE SIN [[id number]]   Certification: I Certify That Inpatient Hospital Services Are Medically Necessary For Greater Than 2 Midnights                     Clay Maradiaga MD  11/05/21 0616      Electronically signed by Clay Maradiaga MD at 11/05/21 0616     Bethany Avila PCT at 11/04/21 1819        Second bladder scan 0 ML each time (Scanned 3 times)     Bethany Avila PCT  11/04/21 1819      Electronically signed by Bethany Avila PCT at 11/04/21 1819     Symes, Heather at 11/04/21 2202        Dr. Santos is on the line with Dr. Sandy at this time.      Symes, Heather  11/04/21 2202      Electronically signed by Symes, Heather at 11/04/21 2202       Vital  Signs (last day)     Date/Time Temp Temp src Pulse Resp BP Patient Position SpO2    11/05/21 0658 98 (36.7) Oral 83 20 118/67 Lying 98    11/05/21 0617 -- -- -- -- 97/52 -- 96    11/05/21 0603 -- -- -- -- 94/47 -- 91    11/05/21 0547 -- -- -- -- 110/56 -- --    11/05/21 0532 -- -- -- -- 123/58 -- 96    11/05/21 0517 -- -- -- -- 117/58 -- 98    11/05/21 0503 -- -- -- -- 104/52 -- 97    11/05/21 0447 -- -- -- -- 107/55 -- 93    11/05/21 0432 -- -- -- -- 116/63 -- 95    11/05/21 0417 -- -- -- -- 109/55 -- 94    11/05/21 0403 -- -- -- -- 125/58 -- 92    11/05/21 0348 -- -- -- -- 159/74 -- 97    11/05/21 0333 -- -- -- -- 174/78 -- 97    11/05/21 0317 -- -- -- -- 171/80 -- 99    11/05/21 0247 -- -- -- -- 111/54 -- 93    11/05/21 0232 -- -- -- -- 101/47 -- --    11/05/21 0217 -- -- -- -- 109/51 -- 93    11/05/21 0202 -- -- -- -- 114/53 -- 96    11/05/21 0147 -- -- -- -- 96/46 -- 91    11/05/21 0133 -- -- -- -- 106/48 -- --    11/05/21 0118 -- -- -- -- 151/68 -- 95    11/05/21 0102 -- -- -- -- 155/71 -- 97    11/05/21 0047 -- -- -- -- 173/76 -- 98    11/05/21 0033 -- -- -- -- 165/78 -- 90    11/05/21 0032 -- -- -- -- 165/78 -- --    11/05/21 0002 -- -- 86 -- 172/75 -- 99    11/04/21 2348 -- -- 85 -- 177/81 -- 99    11/04/21 2334 -- -- 89 -- 181/71 -- 96    11/04/21 2318 -- -- 86 -- 151/63 -- 97    11/04/21 2303 -- -- 80 -- 177/81 -- 97    11/04/21 2257 -- -- 81 -- 164/68 -- 97    11/04/21 2256 -- -- 83 -- -- -- 92    11/04/21 2200 -- -- 79 -- -- -- 100    11/04/21 2119 -- -- 78 -- -- -- 99    11/04/21 2051 -- -- 75 -- -- -- 98    11/04/21 2022 -- -- 78 -- -- -- 100    11/04/21 2021 -- -- 78 -- -- -- 95    11/04/21 1950 -- -- 80 -- -- -- 91    11/04/21 1941 -- -- 85 -- -- -- 95    11/04/21 1937 -- -- 84 -- -- -- 99    11/04/21 1934 -- -- 83 -- -- -- 98    11/04/21 1823 -- -- -- -- -- -- 97    11/04/21 17:07:28 97.3 (36.3) Infrared 75 17 140/49 -- 96    11/04/21 1500 97.1 (36.2) Infrared 75 18 136/43 -- 98            Current  Facility-Administered Medications   Medication Dose Route Frequency Provider Last Rate Last Admin   • acetaminophen (TYLENOL) tablet 650 mg  650 mg Oral Q6H PRN Marta Caceres PA       • dextrose (D50W) (25 g/50 mL) IV injection 25 g  25 g Intravenous Q15 Min PRN Berenice Hicks DO       • dextrose (GLUTOSE) oral gel 15 g  15 g Oral Q15 Min PRN Berenice Hicks DO       • glucagon (human recombinant) (GLUCAGEN DIAGNOSTIC) injection 1 mg  1 mg Subcutaneous Q15 Min PRN Berenice Hicks DO       • heparin (porcine) 5000 UNIT/ML injection 5,000 Units  5,000 Units Subcutaneous Q12H Berenice Hicks DO   5,000 Units at 11/05/21 0853   • insulin aspart (novoLOG) injection 0-7 Units  0-7 Units Subcutaneous TID AC Marta Caceres PA       • nitroglycerin (NITROSTAT) SL tablet 0.4 mg  0.4 mg Sublingual Q5 Min PRN Berenice Hicks DO       • ondansetron (ZOFRAN) injection 4 mg  4 mg Intravenous Q6H PRN Marta Caceres PA       • pantoprazole (PROTONIX) EC tablet 40 mg  40 mg Oral Q AM ENRICO'Marta Qiu PA   40 mg at 11/05/21 0853   • Pharmacy Consult   Does not apply Continuous PRN Berenice Hicks DO       • sodium bicarbonate 8.4 % 75 mEq in sodium chloride 0.45 % 1,000 mL infusion (greater than 75 mEq)  75 mEq Intravenous Continuous Berenice Hicks  mL/hr at 11/04/21 2352 75 mEq at 11/04/21 2352   • sodium chloride 0.9 % flush 10 mL  10 mL Intravenous Q12H Berenice Hicks DO       • sodium chloride 0.9 % flush 10 mL  10 mL Intravenous PRN Berenice Hicks DO       • traZODone (DESYREL) tablet 100 mg  100 mg Oral Nightly Berenice Hicks DO   100 mg at 11/05/21 0009       Lab Results (last 24 hours)     Procedure Component Value Units Date/Time    POC Glucose Once [[id number]]  (Abnormal) Collected: 11/05/21 0852    Specimen: Blood Updated: 11/05/21 0910     Glucose 145 mg/dL      Comment: Meter: [id number] : [id number] paul Beatty  Metabolic Panel [[id number]]  (Abnormal) Collected: 11/05/21 0719    Specimen: Blood Updated: 11/05/21 0838     Glucose 62 mg/dL      BUN 62 mg/dL      Creatinine 3.95 mg/dL      Sodium 145 mmol/L      Potassium 4.7 mmol/L      Chloride 114 mmol/L      CO2 17.8 mmol/L      Calcium 7.3 mg/dL      Total Protein 5.7 g/dL      Albumin 3.46 g/dL      ALT (SGPT) 10 U/L      AST (SGOT) 13 U/L      Alkaline Phosphatase 89 U/L      Total Bilirubin <0.2 mg/dL      eGFR Non African Amer 11 mL/min/1.73      Comment: <15 Indicative of kidney failure.        eGFR   Amer --     Comment: <15 Indicative of kidney failure.        Globulin 2.2 gm/dL      A/G Ratio 1.5 g/dL      BUN/Creatinine Ratio 15.7     Anion Gap 13.2 mmol/L     Narrative:      GFR Normal >60  Chronic Kidney Disease <60  Kidney Failure <15      CBC & Differential [[id number]]  (Abnormal) Collected: 11/05/21 0719    Specimen: Blood Updated: 11/05/21 0815    Narrative:      The following orders were created for panel order CBC & Differential.  Procedure                               Abnormality         Status                     ---------                               -----------         ------                     CBC Auto Differential[[id number]]        Abnormal            Final result                 Please view results for these tests on the individual orders.    CBC Auto Differential [[id number]]  (Abnormal) Collected: 11/05/21 0719    Specimen: Blood Updated: 11/05/21 0815     WBC 5.31 10*3/mm3      RBC 2.54 10*6/mm3      Hemoglobin 7.7 g/dL      Hematocrit 25.6 %      .8 fL      MCH 30.3 pg      MCHC 30.1 g/dL      RDW 14.3 %      RDW-SD 51.4 fl      MPV 11.1 fL      Platelets 110 10*3/mm3      Neutrophil % 57.7 %      Lymphocyte % 29.0 %      Monocyte % 6.8 %      Eosinophil % 5.3 %      Basophil % 0.8 %      Immature Grans % 0.4 %      Neutrophils, Absolute 3.07 10*3/mm3      Lymphocytes, Absolute 1.54 10*3/mm3      Monocytes, Absolute 0.36  10*3/mm3      Eosinophils, Absolute 0.28 10*3/mm3      Basophils, Absolute 0.04 10*3/mm3      Immature Grans, Absolute 0.02 10*3/mm3      nRBC 0.0 /100 WBC     POC Glucose Once [[id number]]  (Abnormal) Collected: 11/05/21 0745    Specimen: Blood Updated: 11/05/21 0811     Glucose 59 mg/dL      Comment: Meter: [id number] : [id number] paul rojas       Troponin [[id number]]  (Normal) Collected: 11/05/21 0318    Specimen: Blood from Arm, Left Updated: 11/05/21 0749     Troponin T 0.025 ng/mL     Narrative:      Troponin T Reference Range:  <= 0.03 ng/mL-   Negative for AMI  >0.03 ng/mL-     Abnormal for myocardial necrosis.  Clinicians would have to utilize clinical acumen, EKG, Troponin and serial changes to determine if it is an Acute Myocardial Infarction or myocardial injury due to an underlying chronic condition.       Results may be falsely decreased if patient taking Biotin.      TSH [[id number]]  (Abnormal) Collected: 11/05/21 0318    Specimen: Blood from Arm, Left Updated: 11/05/21 0749     TSH 4.940 uIU/mL     T4, Free [[id number]]  (Abnormal) Collected: 11/05/21 0318    Specimen: Blood from Arm, Left Updated: 11/05/21 0749     Free T4 0.87 ng/dL     Narrative:      Results may be falsely increased if patient taking Biotin.      Hemoglobin A1c [[id number]]  (Abnormal) Collected: 11/04/21 1523    Specimen: Blood Updated: 11/05/21 0743     Hemoglobin A1C 6.40 %     Narrative:      Hemoglobin A1C Ranges:    Increased Risk for Diabetes  5.7% to 6.4%  Diabetes                     >= 6.5%  Diabetic Goal                < 7.0%    Urine Drug Screen - Urine, Clean Catch [[id number]]  (Abnormal) Collected: 11/04/21 1806    Specimen: Urine, Clean Catch Updated: 11/05/21 0712     THC, Screen, Urine Negative     Phencyclidine (PCP), Urine Negative     Cocaine Screen, Urine Negative     Methamphetamine, Ur Negative     Opiate Screen Positive     Amphetamine Screen, Urine Negative     Benzodiazepine Screen, Urine Negative      Tricyclic Antidepressants Screen Negative     Methadone Screen, Urine Negative     Barbiturates Screen, Urine Negative     Oxycodone Screen, Urine Negative     Propoxyphene Screen Negative     Buprenorphine, Screen, Urine Negative    Narrative:      Cutoff For Drugs Screened:    Amphetamines               500 ng/ml  Barbiturates               200 ng/ml  Benzodiazepines            150 ng/ml  Cocaine                    150 ng/ml  Methadone                  200 ng/ml  Opiates                    100 ng/ml  Phencyclidine               25 ng/ml  THC                            50 ng/ml  Methamphetamine            500 ng/ml  Tricyclic Antidepressants  300 ng/ml  Oxycodone                  100 ng/ml  Propoxyphene               300 ng/ml  Buprenorphine               10 ng/ml    The normal value for all drugs tested is negative. This report includes unconfirmed screening results, with the cutoff values listed, to be used for medical treatment purposes only.  Unconfirmed results must not be used for non-medical purposes such as employment or legal testing.  Clinical consideration should be applied to any drug of abuse test, particularly when unconfirmed results are used.      Basic Metabolic Panel [[id number]]  (Abnormal) Collected: 11/05/21 0318    Specimen: Blood from Arm, Left Updated: 11/05/21 0340     Glucose 112 mg/dL      BUN 65 mg/dL      Creatinine 4.26 mg/dL      Sodium 143 mmol/L      Potassium 4.9 mmol/L      Chloride 111 mmol/L      CO2 18.1 mmol/L      Calcium 7.2 mg/dL      eGFR   Amer --     Comment: <15 Indicative of kidney failure.        eGFR Non African Amer 11 mL/min/1.73      Comment: <15 Indicative of kidney failure.        BUN/Creatinine Ratio 15.3     Anion Gap 13.9 mmol/L     Narrative:      GFR Normal >60  Chronic Kidney Disease <60  Kidney Failure <15      Basic Metabolic Panel [[id number]]  (Abnormal) Collected: 11/04/21 2220    Specimen: Blood from Arm, Right Updated: 11/04/21 2244     Glucose  53 mg/dL      BUN 64 mg/dL      Creatinine 4.56 mg/dL      Sodium 146 mmol/L      Potassium 5.1 mmol/L      Chloride 112 mmol/L      CO2 19.9 mmol/L      Calcium 7.6 mg/dL      eGFR   Amer --     Comment: <15 Indicative of kidney failure.        eGFR Non African Amer 10 mL/min/1.73      Comment: <15 Indicative of kidney failure.        BUN/Creatinine Ratio 14.0     Anion Gap 14.1 mmol/L     Narrative:      GFR Normal >60  Chronic Kidney Disease <60  Kidney Failure <15      Troponin [[id number]]  (Normal) Collected: 11/04/21 2220    Specimen: Blood from Arm, Right Updated: 11/04/21 2244     Troponin T 0.027 ng/mL     Narrative:      Troponin T Reference Range:  <= 0.03 ng/mL-   Negative for AMI  >0.03 ng/mL-     Abnormal for myocardial necrosis.  Clinicians would have to utilize clinical acumen, EKG, Troponin and serial changes to determine if it is an Acute Myocardial Infarction or myocardial injury due to an underlying chronic condition.       Results may be falsely decreased if patient taking Biotin.      Blood Gas, Arterial With Co-Ox [[id number]]  (Abnormal) Collected: 11/04/21 2217    Specimen: Arterial Blood Updated: 11/04/21 2220     Site Left Brachial     Óscar's Test N/A     pH, Arterial 7.221 pH units      Comment: 84 Value below reference range        pCO2, Arterial 48.2 mm Hg      pO2, Arterial 61.7 mm Hg      Comment: 84 Value below reference range        HCO3, Arterial 19.8 mmol/L      Comment: 84 Value below reference range        Base Excess, Arterial -7.5 mmol/L      O2 Saturation, Arterial 91.0 %      Comment: 84 Value below reference range        Hemoglobin, Blood Gas 7.8 g/dL      Comment: 84 Value below reference range        Hematocrit, Blood Gas 24.0 %      Comment: 84 Value below reference range        Oxyhemoglobin 89.4 %      Comment: 84 Value below reference range        Methemoglobin 0.70 %      Carboxyhemoglobin 1.1 %      A-a Gradiant 27.9 mmHg      CO2 Content 21.2 mmol/L       Temperature 0.0 C      Barometric Pressure for Blood Gas 733 mmHg      Modality Room Air     FIO2 21 %      Ventilator Mode NA     Note --     Collected by [id number]     Comment: Meter: [id number]     :  [id number]        pH, Temp Corrected --     pCO2, Temperature Corrected --     pO2, Temperature Corrected --    COVID-19 and FLU A/B PCR - Swab, Nasopharynx [[id number]]  (Normal) Collected: 11/04/21 1915    Specimen: Swab from Nasopharynx Updated: 11/04/21 2009     COVID19 Not Detected     Influenza A PCR Not Detected     Influenza B PCR Not Detected    Narrative:      Fact sheet for providers: https://www.fda.gov/media/960050/download    Fact sheet for patients: https://www.fda.gov/media/644692/download    Test performed by PCR.    Urinalysis, Microscopic Only - Urine, Clean Catch [[id number]]  (Abnormal) Collected: 11/04/21 1806    Specimen: Urine, Clean Catch Updated: 11/04/21 1906     RBC, UA 0-2 /HPF      WBC, UA 6-12 /HPF      Bacteria, UA 1+ /HPF      Squamous Epithelial Cells, UA 3-6 /HPF      Hyaline Casts, UA 0-2 /LPF      Methodology Manual Light Microscopy    Urine Culture - Urine, Urine, Clean Catch [[id number]] Collected: 11/04/21 1806    Specimen: Urine, Clean Catch Updated: 11/04/21 1906    Urinalysis With Culture If Indicated - Urine, Clean Catch [[id number]]  (Abnormal) Collected: 11/04/21 1806    Specimen: Urine, Clean Catch Updated: 11/04/21 1848     Color, UA Yellow     Appearance, UA Clear     pH, UA <=5.0     Specific Gravity, UA 1.008     Glucose, UA Negative     Ketones, UA Negative     Bilirubin, UA Negative     Blood, UA Trace     Protein, UA >=300 mg/dL (3+)     Leuk Esterase, UA Trace     Nitrite, UA Negative     Urobilinogen, UA 0.2 E.U./dL    Troponin [[id number]]  (Abnormal) Collected: 11/04/21 1523    Specimen: Blood Updated: 11/04/21 1834     Troponin T 0.034 ng/mL     Narrative:      Troponin T Reference Range:  <= 0.03 ng/mL-   Negative for AMI  >0.03 ng/mL-     Abnormal for  myocardial necrosis.  Clinicians would have to utilize clinical acumen, EKG, Troponin and serial changes to determine if it is an Acute Myocardial Infarction or myocardial injury due to an underlying chronic condition.       Results may be falsely decreased if patient taking Biotin.      POC Glucose Once [[id number]]  (Abnormal) Collected: 11/04/21 1726    Specimen: Blood Updated: 11/04/21 1732     Glucose 152 mg/dL      Comment: Meter: [id number] : [id number] leslee varela       [place] Draw [[id number]] Collected: 11/04/21 1523    Specimen: Blood Updated: 11/04/21 1630    Narrative:      The following orders were created for panel order [place] Draw.  Procedure                               Abnormality         Status                     ---------                               -----------         ------                     Green Top (Gel)[[id number]]                                  Final result               Lavender Top[[id number]]                                     Final result               Gold Top - SST[[id number]]                                   Final result               Light Blue Top[[id number]]                                   Final result                 Please view results for these tests on the individual orders.    Green Top (Gel) [[id number]] Collected: 11/04/21 1523    Specimen: Blood Updated: 11/04/21 1630     Extra Tube Hold for add-ons.     Comment: Auto resulted.       Gold Top - SST [[id number]] Collected: 11/04/21 1523    Specimen: Blood Updated: 11/04/21 1630     Extra Tube Hold for add-ons.     Comment: Auto resulted.       Light Blue Top [[id number]] Collected: 11/04/21 1523    Specimen: Blood Updated: 11/04/21 1630     Extra Tube hold for add-on     Comment: Auto resulted       Lavender Top [[id number]] Collected: 11/04/21 1523    Specimen: Blood Updated: 11/04/21 1630     Extra Tube hold for add-on     Comment: Auto resulted       Comprehensive Metabolic Panel [[id number]]  (Abnormal)  Collected: 11/04/21 1523    Specimen: Blood Updated: 11/04/21 1553     Glucose 129 mg/dL      BUN 65 mg/dL      Creatinine 4.57 mg/dL      Sodium 141 mmol/L      Potassium 6.0 mmol/L      Chloride 107 mmol/L      CO2 17.6 mmol/L      Calcium 7.6 mg/dL      Total Protein 6.7 g/dL      Albumin 4.02 g/dL      ALT (SGPT) 10 U/L      AST (SGOT) 14 U/L      Alkaline Phosphatase 101 U/L      Total Bilirubin 0.2 mg/dL      eGFR Non African Amer 10 mL/min/1.73      Comment: <15 Indicative of kidney failure.        eGFR   Amer --     Comment: <15 Indicative of kidney failure.        Globulin 2.7 gm/dL      A/G Ratio 1.5 g/dL      BUN/Creatinine Ratio 14.2     Anion Gap 16.4 mmol/L     Narrative:      GFR Normal >60  Chronic Kidney Disease <60  Kidney Failure <15      Magnesium [[id number]]  (Normal) Collected: 11/04/21 1523    Specimen: Blood Updated: 11/04/21 1553     Magnesium 2.0 mg/dL     CBC & Differential [[id number]]  (Abnormal) Collected: 11/04/21 1523    Specimen: Blood Updated: 11/04/21 1529    Narrative:      The following orders were created for panel order CBC & Differential.  Procedure                               Abnormality         Status                     ---------                               -----------         ------                     CBC Auto Differential[[id number]]        Abnormal            Final result                 Please view results for these tests on the individual orders.    CBC Auto Differential [[id number]]  (Abnormal) Collected: 11/04/21 1523    Specimen: Blood Updated: 11/04/21 1529     WBC 4.45 10*3/mm3      RBC 2.61 10*6/mm3      Hemoglobin 8.0 g/dL      Hematocrit 25.9 %      MCV 99.2 fL      MCH 30.7 pg      MCHC 30.9 g/dL      RDW 14.1 %      RDW-SD 50.6 fl      MPV 10.4 fL      Platelets 114 10*3/mm3      Neutrophil % 58.9 %      Lymphocyte % 28.5 %      Monocyte % 5.4 %      Eosinophil % 6.3 %      Basophil % 0.7 %      Immature Grans % 0.2 %      Neutrophils, Absolute 2.62  10*3/mm3      Lymphocytes, Absolute 1.27 10*3/mm3      Monocytes, Absolute 0.24 10*3/mm3      Eosinophils, Absolute 0.28 10*3/mm3      Basophils, Absolute 0.03 10*3/mm3      Immature Grans, Absolute 0.01 10*3/mm3      nRBC 0.0 /100 WBC         Imaging Results (Last 24 Hours)     Procedure Component Value Units Date/Time    XR Chest 1 View [[id number]] Collected: 11/04/21 1957     Updated: 11/04/21 1959    Narrative:      CR Chest 1 Vw    INDICATION:   Acute kidney injury. BUN, creatinine and potassium are elevated.     COMPARISON:    9/22/2019    FINDINGS:  Portable AP view(s) of the chest.  The heart and mediastinal contours are normal. The lungs are clear. No pneumothorax or pleural effusion.      Impression:      No acute cardiopulmonary findings.    Signer Name: GWENDOLYN Ventura MD   Signed: 11/4/2021 7:57 PM   Workstation Name: [facility]    [facility]        ECG/EMG Results (last 24 hours)     Procedure Component Value Units Date/Time    ECG 12 Lead [[id number]] Collected: 11/04/21 1527     Updated: 11/04/21 1717     QT Interval 412 ms      QTC Interval 451 ms     Narrative:      Test Reason : ARF  Blood Pressure :   */*   mmHG  Vent. Rate :  72 BPM     Atrial Rate :  72 BPM     P-R Int : 158 ms          QRS Dur :  94 ms      QT Int : 412 ms       P-R-T Axes :   0  -2  36 degrees     QTc Int : 451 ms    Normal sinus rhythm  Normal ECG  When compared with ECG of 23-JAN-2020 11:45,  QT has shortened  Confirmed by Crescencio Helton ([id number]) on 11/4/2021 5:12:34 PM    Referred By: OSMAN           Confirmed By: Crescencio Helton

## 2021-11-05 NOTE — H&P
[facility] Medicine Services  HISTORY & PHYSICAL    Patient Identification:  Name:  Reny Elena  Age:  61 y.o.  Sex:  female  :  1960  MRN:  [id number]   Visit Number:  [id number]  Admit Date: 2021   Primary Care Physician:  Susan Stephens APRN     Subjective     Chief complaint:   Chief Complaint   Patient presents with   • Abnormal Lab     History of presenting illness:   Patient is a 61 y.o. 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  Patient states that she was seen by her primary care yesterday for routine follow-up.  Patient states she was contacted and encouraged to come to the ED for further evaluation due to increased creatinine and potassium of 6.0.  Patient denies any complaints.  She states she is been at her baseline state of health.  She denies any abdominal pain or flank pain.  She denies any decreased appetite, diarrhea, nausea, or vomiting.  Denies decreased oral intake.  She denies any urinary symptoms, no hematuria or dysuria, no decreased urine output.  She denies any chest pain or dyspnea.  Denies any headache or dizziness.  She denies any blood in stool.  No fevers or chills.  Patient states she does follow with nephrology, reports she was last seen in May 2021.  She is not on dialysis.    Upon arrival to the ED, vitals were temperature 97.1, HR 75, RR 18, /43 that did increase to 181/71, and oxygen saturation 98% on room air. Troponin T 0.034 with repeat at 0.027. ABG with pH 7.221, pCO2 48.2, pO2 61.7, HCO3 19.8, and oxygen saturation 91% on room air. CMP with glucose 129, potassium 6.0, CO2 17.6, anion gap 16.4,  creatinine 4.57, BUN 65, eGFR 10. Mag 2.0. CBC with hemoglobin 8.0, hematocrit 30.7, MCV 99.2, platelets 114. Urinalysis with trace blood, trace LE, 3+ protein, 6-12 WBC, 1+ bacteria. COVID-19/influenza screening negative. CXR with no evidence of acute cardiopulmonary disease. Known ED medication administration: 30 g PO kayexalate, 50 mEq IV sodium bicarb, 20 g PO lactulose, 10 units regular insulin IV, 50 mL D50W (25g Dextrose) x 2, 1 g IV calcium gluconate, 0.1 mg PO clonidine,  acetaminophen/hydrocodone x 1, and 100 mg PO trazodone. Patient was also initially started on NS at 125 ml/hr and was switched over to 1/2 NS with 75 mEq Bicarbonate at 125 ml/hr per nephrology recommendations to ED provider. It is of note that the patient did become hypoglycemia in ED with blood glucose 53 after hyperkalemia treatment, but did improve with D50W injection.     Patient has been admitted to the medical surgical floor for further evaluation and treatment.     Present during exam: N/A  ---------------------------------------------------------------------------------------------------------------------   Review of Systems   Constitutional: Negative for activity change, appetite change, chills, diaphoresis, fatigue and fever.   HENT: Negative for congestion and sore throat.    Eyes: Negative for discharge and visual disturbance.   Respiratory: Negative for cough, chest tightness, shortness of breath and wheezing.    Cardiovascular: Negative for chest pain, palpitations and leg swelling.   Gastrointestinal: Negative for abdominal pain, constipation, diarrhea, nausea and vomiting.   Genitourinary: Negative for decreased urine volume, dysuria, frequency, hematuria and urgency.   Musculoskeletal: Negative for arthralgias, gait problem and myalgias.   Skin: Negative for color change and wound.   Neurological: Negative for dizziness, weakness, light-headedness and headaches.   Psychiatric/Behavioral: Negative for confusion. The  patient is not nervous/anxious.       ---------------------------------------------------------------------------------------------------------------------   Past Medical History:   Diagnosis Date   • Arthritis    • Closed fracture of right fibula and tibia 2018   • Depression    • Diabetic ulcer of left foot associated with type 2 diabetes mellitus (CMS/HCC) 2017   • Diastolic dysfunction    • Essential hypertension    • Hyperlipidemia    • Iron deficiency anemia 2018   • PAD (peripheral artery disease) (CMS/HCC)    • Type 2 diabetes mellitus with hyperglycemia, with long-term current use of insulin (CMS/HCC) 2018     Past Surgical History:   Procedure Laterality Date   • BACK SURGERY      Post spinal diskectomy, osteophytectomy in one lumbar interspace   • CATARACT EXTRACTION      Left    •  SECTION     • DENTAL PROCEDURE     • HYSTERECTOMY     • INCISION AND DRAINAGE LEG Left 4/15/2017    Procedure: INCISION AND DRAINAGE LOWER EXTREMITY;  Surgeon: Basilio Morris MD;  Location: [facility];  Service:    • INCISION AND DRAINAGE LEG Left 2017    Procedure: Irrigation and Debridment abcess diabetic wound left foot with deep culture;  Surgeon: Basilio Morris MD;  Location: [facility];  Service:    • KNEE ARTHROSCOPY Right    • ORIF TIBIA FRACTURE Right 2018    Procedure: TIBIAL  FRACTURE OPEN REDUCTION INTERNAL FIXATION;  Surgeon: Jung Barragan MD;  Location: [facility];  Service: Orthopedics   • TOE AMPUTATION Right     5th   • TUBAL ABDOMINAL LIGATION       Family History   Problem Relation Age of Onset   • Heart disease Mother    • Cancer Mother    • COPD Mother    • Diabetes Other    • Depression Other      Social History     Socioeconomic History   • Marital status:    Tobacco Use   • Smoking status: Never Smoker   • Smokeless tobacco: Never Used   Substance and Sexual Activity   • Alcohol use: No   • Drug use: No   • Sexual activity: Defer      ---------------------------------------------------------------------------------------------------------------------   Allergies:  Penicillins, Codeine, and Sulfa antibiotics  ---------------------------------------------------------------------------------------------------------------------   Medications below are reported home medications pulling from within the system; at this time, these medications have not been reconciled unless otherwise specified and are in the verification process for further verifcation as current home medications.    Prior to Admission Medications     Prescriptions Last Dose Informant Patient Reported? Taking?    busPIRone (BUSPAR) 10 MG tablet  Pharmacy Yes No    Take 1 tablet by mouth 3 (Three) Times a Day.    Cephalexin 500 MG tablet  Pharmacy No No    Take 500 mg by mouth Daily.    CloNIDine (CATAPRES) 0.1 MG tablet  Pharmacy Yes No    Take 0.1 mg by mouth Every 6 (Six) Hours As Needed for High Blood Pressure (greater than 160/90).    clopidogrel (PLAVIX) 75 MG tablet  Pharmacy No No    Take 1 tablet by mouth Daily.    escitalopram (LEXAPRO) 10 MG tablet  Pharmacy Yes No    Take 10 mg by mouth Daily.    HYDROcodone-acetaminophen (NORCO)  MG per tablet  [facility] Yes No    Take 1 tablet by mouth Every 6 (Six) Hours As Needed for Mild Pain . Pharmacy directions states every 4 to 6 hours prn pain.     Insulin Glargine (BASAGLAR KWIKPEN) 100 UNIT/ML injection pen   No No    Inject 12 Units under the skin into the appropriate area as directed Daily.    insulin lispro (ADMELOG) 100 UNIT/ML injection  Pharmacy Yes No    Inject 1-5 Units under the skin into the appropriate area as directed 3 (Three) Times a Day Before Meals.    levETIRAcetam (KEPPRA) 750 MG tablet  Pharmacy No No    Take 1 tablet by mouth Every 12 (Twelve) Hours.    lisinopril (PRINIVIL,ZESTRIL) 40 MG tablet  Pharmacy No No    Take 1 tablet by mouth Daily.    ondansetron ODT (ZOFRAN-ODT) 4 MG disintegrating tablet    No No    Place 1 tablet on the tongue Every 6 (Six) Hours As Needed for Vomiting.    pantoprazole (PROTONIX) 40 MG EC tablet  Pharmacy Yes No    Take 40 mg by mouth Daily.    pravastatin (PRAVACHOL) 20 MG tablet  Pharmacy Yes No    Take 20 mg by mouth Daily.    rOPINIRole (REQUIP) 0.5 MG tablet  Pharmacy Yes No    Take 0.5 mg by mouth 2 (Two) Times a Day. Take 1 hour before bedtime.    temazepam (RESTORIL) 30 MG capsule  Pharmacy Yes No    Take 30 mg by mouth Every Night.    tiZANidine (ZANAFLEX) 4 MG tablet  Pharmacy Yes No    Take 4 mg by mouth Every 6 (Six) Hours As Needed for Muscle Spasms.    vitamin D (ERGOCALCIFEROL) 05647 units capsule capsule  Pharmacy Yes No    Take 50,000 Units by mouth 1 (One) Time Per Week. Prior to [facility] Admission, Patient was on: takes on wednesdays        ---------------------------------------------------------------------------------------------------------------------    Objective     Hospital Scheduled Meds:  traZODone, 100 mg, Oral, Nightly      Pharmacy Consult,   sodium bicarbonate drip (greater than 75 mEq/bag), 75 mEq, Last Rate: 75 mEq (11/04/21 2352)  sodium chloride, 125 mL/hr, Last Rate: Stopped (11/04/21 2352)      Current listed hospital scheduled medications may not yet reflect those currently placed in orders that are signed and held, awaiting patient's arrival to floor/unit.    ---------------------------------------------------------------------------------------------------------------------   Vital Signs:  Temp:  [97.1 °F (36.2 °C)-97.3 °F (36.3 °C)] 97.3 °F (36.3 °C)  Heart Rate:  [75-89] 86  Resp:  [17-18] 17  BP: ()/(43-81) 117/58  Mean Arterial Pressure (Non-Invasive) for the past 24 hrs (Last 3 readings):   Noninvasive MAP (mmHg)   11/05/21 0517 84   11/05/21 0503 79   11/05/21 0447 79     SpO2 Percentage    11/05/21 0447 11/05/21 0503 11/05/21 0517   SpO2: 93% 97% 98%     SpO2:  [90 %-100 %] 98 %  on   ;   Device (Oxygen Therapy): room air    Body  mass index is 22.13 kg/m².  Wt Readings from Last 3 Encounters:   11/04/21 60.3 kg (133 lb)   06/23/20 53.1 kg (117 lb)   01/23/20 54.9 kg (121 lb)       ---------------------------------------------------------------------------------------------------------------------   Physical Exam:  Physical Exam  Nursing note reviewed.   Constitutional:       General: She is awake. She is not in acute distress.     Appearance: She is well-developed. She is not toxic-appearing.      Comments: Pleasant, sitting up in bed, no acute distress noted.  On room air.  No family present at bedside.   HENT:      Head: Normocephalic and atraumatic.      Mouth/Throat:      Mouth: Mucous membranes are moist.   Eyes:      Conjunctiva/sclera: Conjunctivae normal.      Pupils: Pupils are equal, round, and reactive to light.   Cardiovascular:      Rate and Rhythm: Normal rate and regular rhythm.      Pulses:           Dorsalis pedis pulses are 2+ on the right side and 2+ on the left side.      Heart sounds: Normal heart sounds. No murmur heard.  No friction rub. No gallop.    Pulmonary:      Effort: Pulmonary effort is normal. No tachypnea, accessory muscle usage or respiratory distress.      Breath sounds: Normal breath sounds and air entry. No wheezing, rhonchi or rales.      Comments: On room air, speaks in full sentences without dyspnea.  Abdominal:      General: Bowel sounds are normal. There is no distension.      Palpations: Abdomen is soft.      Tenderness: There is no abdominal tenderness. There is no right CVA tenderness, left CVA tenderness, guarding or rebound.   Genitourinary:     Comments: No tate catheter in place.  Musculoskeletal:      Cervical back: Neck supple.      Right lower leg: No edema.      Left lower leg: No edema.   Skin:     General: Skin is warm and dry.      Capillary Refill: Capillary refill takes less than 2 seconds.   Neurological:      General: No focal deficit present.      Mental Status: She is alert and  oriented to person, place, and time.      GCS: GCS eye subscore is 4. GCS verbal subscore is 5. GCS motor subscore is 6.      Cranial Nerves: Cranial nerves are intact.      Sensory: Sensation is intact.      Motor: Motor function is intact.      Comments: Awake and alert. Follows commands. Answers questions appropriately. Moves all extremities equally. Strength and sensation intact. No focal neuro deficit on exam.   Psychiatric:         Attention and Perception: Attention normal.         Mood and Affect: Mood normal.         Speech: Speech normal.         Behavior: Behavior is cooperative.       ---------------------------------------------------------------------------------------------------------------------  EKG: See cardiology read as per below     Telemetry:    Patient not currently on telemetry monitoring, review once available.    I have personally reviewed the EKG/Telemetry strip  ---------------------------------------------------------------------------------------------------------------------   Results from last 7 days   Lab Units 11/04/21  2220 11/04/21  1523   TROPONIN T ng/mL 0.027 0.034*         Results from last 7 days   Lab Units 11/04/21  2217   PH, ARTERIAL pH units 7.221*   PO2 ART mm Hg 61.7*   PCO2, ARTERIAL mm Hg 48.2*   HCO3 ART mmol/L 19.8*     Results from last 7 days   Lab Units 11/04/21  1523   WBC 10*3/mm3 4.45   HEMOGLOBIN g/dL 8.0*   HEMATOCRIT % 25.9*   MCV fL 99.2*   MCHC g/dL 30.9*   PLATELETS 10*3/mm3 114*     Results from last 7 days   Lab Units 11/05/21  0318 11/04/21  2220 11/04/21  1523 11/04/21  1233 11/04/21  1233   SODIUM mmol/L 143 146* 141   < > 140   POTASSIUM mmol/L 4.9 5.1 6.0*   < > 6.0*   MAGNESIUM mg/dL  --   --  2.0  --   --    CHLORIDE mmol/L 111* 112* 107   < > 111*   CO2 mmol/L 18.1* 19.9* 17.6*   < > 16.0*   BUN mg/dL 65* 64* 65*   < > 64*   CREATININE mg/dL 4.26* 4.56* 4.57*   < > 4.29*   EGFR IF NONAFRICN AM mL/min/1.73 11* 10* 10*   < > 10*   CALCIUM mg/dL  7.2* 7.6* 7.6*   < > 7.5*   GLUCOSE mg/dL 112* 53* 129*   < > 123*   ALBUMIN g/dL  --   --  4.02  --  3.90   BILIRUBIN mg/dL  --   --  0.2  --  <0.2   ALK PHOS U/L  --   --  101  --  94   AST (SGOT) U/L  --   --  14  --  12   ALT (SGPT) U/L  --   --  10  --  10    < > = values in this interval not displayed.   Estimated Creatinine Clearance: 12.9 mL/min (A) (by C-G formula based on SCr of 4.26 mg/dL (H)).    Glucose   Date/Time Value Ref Range Status   11/04/2021 1726 152 (H) 70 - 130 mg/dL Final     Comment:     Meter: [id number] : [id number] leslee varela     Lab Results   Component Value Date    HGBA1C 9.20 (H) 07/24/2019     Lab Results   Component Value Date    TSH 6.060 (H) 08/05/2019    FREET4 0.83 (L) 08/06/2019     Microbiology Results (last 10 days)     Procedure Component Value - Date/Time    COVID-19 and FLU A/B PCR - Swab, Nasopharynx [[id number]]  (Normal) Collected: 11/04/21 1915    Lab Status: Final result Specimen: Swab from Nasopharynx Updated: 11/04/21 2009     COVID19 Not Detected     Influenza A PCR Not Detected     Influenza B PCR Not Detected    Narrative:      Fact sheet for providers: https://www.fda.gov/media/806881/download    Fact sheet for patients: https://www.fda.gov/media/426656/download    Test performed by PCR.         Pain Management Panel     Pain Management Panel Latest Ref Rng & Units 4/2/2021 4/2/2021    CREATININE UR mg/dL 56.7 56.7    AMPHETAMINES SCREEN, URINE Negative - -    BARBITURATES SCREEN Negative - -    BENZODIAZEPINE SCREEN, URINE Negative - -    BUPRENORPHINEUR Negative - -    COCAINE SCREEN, URINE Negative - -    METHADONE SCREEN, URINE Negative - -        I have personally reviewed the above laboratory results.   ---------------------------------------------------------------------------------------------------------------------  Imaging Results (Last 7 Days)     Procedure Component Value Units Date/Time    XR Chest 1 View [[id number]] Collected: 11/04/21 1957      Updated: 11/04/21 1959    Narrative:      FINDINGS:  Portable AP view(s) of the chest.  The heart and mediastinal contours are normal. The lungs are clear. No pneumothorax or pleural effusion.      Impression:      No acute cardiopulmonary findings.    Signer Name: GWENDOLYN Ventura MD   Signed: 11/4/2021 7:57 PM   Workstation Name: [facility]-    [facility] have personally reviewed the above radiology results.     Last Echocardiogram:  Results for orders placed during the hospital encounter of 03/15/19    Adult Transthoracic Echo Complete W/ Cont if Necessary Per Protocol    Interpretation Summary  · Normal left ventricular cavity size and wall thickness noted.  · Left ventricular diastolic dysfunction (grade I) consistent with impaired relaxation.  · Left ventricular systolic function is mildly decreased.  · Estimated EF appears to be in the range of 46 - 50%.  · The aortic valve is abnormal in structure. The valve exhibits sclerosis. Mild aortic valve regurgitation is present. No aortic valve stenosis is present.  · The mitral valve is normal in structure. Mild mitral valve regurgitation is present. No significant mitral valve stenosis is present.  · The tricuspid valve is normal. No evidence of tricuspid valve stenosis is present. No tricuspid valve regurgitation is present.  · There is no evidence of pericardial effusion.  · Comments: LV systolic function has decreased since 11/22/18(LVEF decreased from 56-60% then to 46-50% now)    ---------------------------------------------------------------------------------------------------------------------    Assessment & Plan      -Acute on chronic stage IIIb-IV CKD   -Elevated anion gap metabolic acidosis   Baseline creatinine previously 1.0-1.2 in 2019, 1.7-2 2020, only lab from 2021 available to review is from 4/2021 with creatinine of 2.47. Creatinine on presentation today 4.5.  UA slightly abnormal with bacteria, urine culture  pending   3+ proteinuria noted  Obtain urine studies/chemistries   As patient is not having any abdominal pain or back pain/flank pain, will hold off on any further imaging.  Will defer imaging to nephrology once evaluated.  Nephrology consulted, input/assistance is much appreciated   Cont IV fluids with 1/2 NS with 75 mEq 125 cc/hr per nephro recommendations in ED   Treat hyperkalemia as needed, currently improved.   Monitor urine output and renal function closely  Avoid nephrotoxins as much as possible   Repeat chemistry panel in AM    -Hyperkalemia  · Initial potassium 6.0, now improved to 4.9 after treatment in ED with kayexalate, lactulose, calcium gluconate, insulin/dextrose as well as bicarb/fluids   · Closely monitor with serial chemistry panels   · Monitor closely for arrhythmia on telemetry   · EKG stable   · Treat HORACE as outline above, patient reports bowel movement after Kayexalate/lactulose    -Mild troponinemia   · On down trend  · Likely d/t renal dysfunction   · Patient denies chest pain   · EKG without acute ischemic changes   · Cont telemetry monitoring   · Cont IV fluids   · BP management   · Obtain third troponin T     -Essential hypertension  • BP labile in ED. Initially controlled with increase in SBP to 180s. Home clonidine dosage was given, SBP now in the 90s-100s after treatment in ED   • Plan to resume home antihypertensive regimen once reconciled per pharmacy with appropriate holding parameters to prevent hypotension and/or bradycardia   • Closely monitor BP per hospital protocol, titrate medications as necessary     -Hyperlipidemia  · AM lipid panel   · Cont home statin     -Combined systolic (EF 46-50%) and diastolic (grade I) CHF, compensated  -Mild mitral valve regurgitation   -Mild aortic valve regurgitation   · Appears grossly compensated   · TTE from 3/2019 reviewed   · Monitor volume status closely, IV fluids on board for HORACE   · Strict Is and Os, daily weights     -Type II  diabetes mellitus, insulin dependent   • Obtain HgbA1C  • Review home medication regimen once reconciled per pharmacy   • Hold home oral DM medications for now.  • Hypoglycemic with BG 57 in ED after regular insulin IV for hyperkalemia, stabilized with D5W  • Start low dose SSI to utilize as needed, titrate dosage as necessary   • Closely monitor blood glucose levels with accuchecks QAC and QHS  • Hypoglycemia protocol in place should it be necessary    -Hypothyroidism  · Obtain TSH and free T4  · Plan resume home levothyroxine dosage when reconciled per pharmacy, adjust dosage if necessary depending on laboratory results    -Macrocytic anemia   -Mild thrombocytopenia   · Patient appears to have chronic anemia, likely AOCD   · However, hemoglobin is slightly decreased from her baseline   · Plts slightly decreased, per review of chart plts low intermittently   · No active bleeding noted or reported   · Obtain PT/INR, aPTT   · Obtain iron profile and ferritin levels   · Obtain vitamin B12, folate, and vitamin D levels. Plan to supplement if patient is found to be deficient.   · Closely monitor H&H, transfuse if necessary   · Repeat CBC in AM     -Seizure disorder   · Seizure precautions   · Resume home medication regimen once reconciled per pharmacy   · Per review of chart, patient experiences non convulsive seizures with associated expressive aphasia     -GERD  • PPI    -Anxiety   -Depression   · Supportive care   · Resume home medication regimen once reconciled per pharmacy     -F/E/N  • IV fluids per nephrology. Replace electrolytes per protocol as necessary. Consistent carbohydrate/renal diet.     ---------------------------------------------------  DVT Prophylaxis: SQ heparin   GI Prophylaxis: PPI  Activity: Up with assistance as tolerated    The patient is considered to be a high risk patient due to: HORACE on CKD, metabolic acidosis, hyperkalemia, HTN, CHF, anemia, seizure disorder     INPATIENT status due to the  need for care which can only be reasonably provided in an hospital setting such as aggressive/expedited ancillary services and/or consultation services, the necessity for IV medications, close physician monitoring and/or the possible need for procedures.  In such, I feel patient’s risk for adverse outcomes and need for care warrant INPATIENT evaluation and predict the patient’s care encounter to likely last beyond 2 midnights.    Code Status: FULL CODE     Disposition/Discharge planning: Plans on home once medically stable, pending clinical course     I have discussed the patient's assessment and plan with the patient, nursing staff, and attending physician DO Marta Beltran PA-C  Hospitalist Service -- [facility]   Pager: [phone]    11/05/21  06:02 EDT    Attending Physician: Berenice Hicks DO       ---------------------------------------------------------------------------------------------------------------------

## 2021-11-05 NOTE — PROGRESS NOTES
[facility]    Patient Identification:  Name:  Reny Elena  Age:  61 y.o.  Sex:  female  :  1960  MRN:  [id number]  Visit number:  [id number]  Primary Care Provider:  Susan Stephens APRN    Length of stay in inpatient status:  0    Brief Update     61-year-old female that was admitted earlier today with acute kidney injury on top of her chronic kidney disease; the patient has been asymptomatic from this and was told to come to the hospital based on abnormal labs that her PCP obtained.  The patient had been seeing nephrology and the last time she saw Dr. Sandy he was in 2021.  The patient stated that she was scared because she saw how her sister was suffering with kidney disease and she did not want that same fate; she thought that if she did not think about having kidney disease then she really did not have it.  The patient then had blood work done twice since 2021 and this last time is when she was told that her creatinine was in the 6 range.  Currently, patient denies chest pain, trouble breathing, coughing, leg edema, headaches, muscle cramps, nausea, vomiting, and diarrhea.  Nephrology has been consulted and we await them to see her.  We will repeat her blood work in the morning.  Please note that the patient did have some hypoglycemia of 59 at around 8 AM this morning.  Thus, I have held the home Lantus and we will continue to monitor the blood glucose levels 4 times a day and if they are consistently elevated then we may need to add back some sliding scale NovoLog and a low-dose.  Also, the patient's blood pressures have been as low as then 90 systolic range; therefore, I will hold the home clonidine as I want the blood pressures to be at least in the normal range in order to better perfuse the kidneys and prevent any further kidney failure.    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, baseline creatinine previously 1.0-1.2 in 2019,  1.7-2 in 2020, and the only lab from 2021 available to review is from 4/2021 with creatinine of 2.47  -Elevated anion gap metabolic acidosis due to the renal failure  -Hyperkalemia, suspect due to renal failure  -Mild troponinemia, suspect due to renal failure  -History of Type II diabetes mellitus, insulin dependent   -History of essential hypertension  -History of hyperlipidemia  -History of combined systolic (EF 46-50%) and diastolic (grade I) CHF, compensated and without an acute exacerbation at this time  -History of mild mitral valve regurgitation   -History of mild aortic valve regurgitation       Vinny Sandoval MD  [facility] Hospitalist  11/05/21  13:43 EDT

## 2021-11-05 NOTE — PLAN OF CARE
Goal Outcome Evaluation:   Patient is a new admit today from ER. She has c/o a HA. PRN pain medication give. No other issues noted at this time.

## 2021-11-06 ENCOUNTER — APPOINTMENT (OUTPATIENT)
Dept: ULTRASOUND IMAGING | Facility: HOSPITAL | Age: 61
End: 2021-11-06

## 2021-11-06 LAB
25(OH)D3 SERPL-MCNC: 26.2 NG/ML (ref 30–100)
ABO GROUP BLD: NORMAL
ANION GAP SERPL CALCULATED.3IONS-SCNC: 13.5 MMOL/L (ref 5–15)
BACTERIA SPEC AEROBE CULT: ABNORMAL
BLD GP AB SCN SERPL QL: NEGATIVE
BUN SERPL-MCNC: 55 MG/DL (ref 8–23)
BUN/CREAT SERPL: 15.5 (ref 7–25)
CA-I SERPL ISE-MCNC: 0.9 MMOL/L (ref 1.12–1.32)
CALCIUM SPEC-SCNC: 6.4 MG/DL (ref 8.6–10.5)
CHLORIDE SERPL-SCNC: 107 MMOL/L (ref 98–107)
CO2 SERPL-SCNC: 17.5 MMOL/L (ref 22–29)
CREAT SERPL-MCNC: 3.54 MG/DL (ref 0.57–1)
DEPRECATED RDW RBC AUTO: 50.7 FL (ref 37–54)
ERYTHROCYTE [DISTWIDTH] IN BLOOD BY AUTOMATED COUNT: 14.1 % (ref 12.3–15.4)
FERRITIN SERPL-MCNC: 115.3 NG/ML (ref 13–150)
FOLATE SERPL-MCNC: 17.6 NG/ML (ref 4.78–24.2)
GFR SERPL CREATININE-BSD FRML MDRD: 13 ML/MIN/1.73
GFR SERPL CREATININE-BSD FRML MDRD: ABNORMAL ML/MIN/{1.73_M2}
GLUCOSE BLDC GLUCOMTR-MCNC: 175 MG/DL (ref 70–130)
GLUCOSE BLDC GLUCOMTR-MCNC: 180 MG/DL (ref 70–130)
GLUCOSE BLDC GLUCOMTR-MCNC: 256 MG/DL (ref 70–130)
GLUCOSE BLDC GLUCOMTR-MCNC: 93 MG/DL (ref 70–130)
GLUCOSE SERPL-MCNC: 87 MG/DL (ref 65–99)
HCT VFR BLD AUTO: 20.7 % (ref 34–46.6)
HCT VFR BLD AUTO: 27.9 % (ref 34–46.6)
HGB BLD-MCNC: 6.4 G/DL (ref 12–15.9)
HGB BLD-MCNC: 8.1 G/DL (ref 12–15.9)
IRON 24H UR-MRATE: 46 MCG/DL (ref 37–145)
IRON SATN MFR SERPL: 22 % (ref 20–50)
MAGNESIUM SERPL-MCNC: 1.6 MG/DL (ref 1.6–2.4)
MCH RBC QN AUTO: 30.5 PG (ref 26.6–33)
MCHC RBC AUTO-ENTMCNC: 30.9 G/DL (ref 31.5–35.7)
MCV RBC AUTO: 98.6 FL (ref 79–97)
PHOSPHATE SERPL-MCNC: 6.2 MG/DL (ref 2.5–4.5)
PLATELET # BLD AUTO: 88 10*3/MM3 (ref 140–450)
PMV BLD AUTO: 11.2 FL (ref 6–12)
POTASSIUM SERPL-SCNC: 4.6 MMOL/L (ref 3.5–5.2)
PTH-INTACT SERPL-MCNC: 369.3 PG/ML (ref 15–65)
RBC # BLD AUTO: 2.1 10*6/MM3 (ref 3.77–5.28)
RH BLD: POSITIVE
SODIUM SERPL-SCNC: 138 MMOL/L (ref 136–145)
SODIUM UR-SCNC: 35 MMOL/L
T&S EXPIRATION DATE: NORMAL
TIBC SERPL-MCNC: 210 MCG/DL (ref 298–536)
TRANSFERRIN SERPL-MCNC: 141 MG/DL (ref 200–360)
VIT B12 BLD-MCNC: 274 PG/ML (ref 211–946)
WBC # BLD AUTO: 6.43 10*3/MM3 (ref 3.4–10.8)

## 2021-11-06 PROCEDURE — 86900 BLOOD TYPING SEROLOGIC ABO: CPT

## 2021-11-06 PROCEDURE — 83735 ASSAY OF MAGNESIUM: CPT | Performed by: INTERNAL MEDICINE

## 2021-11-06 PROCEDURE — 85018 HEMOGLOBIN: CPT | Performed by: INTERNAL MEDICINE

## 2021-11-06 PROCEDURE — 85027 COMPLETE CBC AUTOMATED: CPT | Performed by: INTERNAL MEDICINE

## 2021-11-06 PROCEDURE — 82330 ASSAY OF CALCIUM: CPT | Performed by: INTERNAL MEDICINE

## 2021-11-06 PROCEDURE — 76775 US EXAM ABDO BACK WALL LIM: CPT

## 2021-11-06 PROCEDURE — 63710000001 INSULIN ASPART PER 5 UNITS: Performed by: PHYSICIAN ASSISTANT

## 2021-11-06 PROCEDURE — 83970 ASSAY OF PARATHORMONE: CPT | Performed by: INTERNAL MEDICINE

## 2021-11-06 PROCEDURE — 86901 BLOOD TYPING SEROLOGIC RH(D): CPT | Performed by: PHYSICIAN ASSISTANT

## 2021-11-06 PROCEDURE — 82746 ASSAY OF FOLIC ACID SERUM: CPT | Performed by: PHYSICIAN ASSISTANT

## 2021-11-06 PROCEDURE — 25010000002 MAGNESIUM SULFATE 2 GM/50ML SOLUTION: Performed by: INTERNAL MEDICINE

## 2021-11-06 PROCEDURE — 84156 ASSAY OF PROTEIN URINE: CPT | Performed by: INTERNAL MEDICINE

## 2021-11-06 PROCEDURE — 84466 ASSAY OF TRANSFERRIN: CPT | Performed by: PHYSICIAN ASSISTANT

## 2021-11-06 PROCEDURE — 83540 ASSAY OF IRON: CPT | Performed by: PHYSICIAN ASSISTANT

## 2021-11-06 PROCEDURE — 99232 SBSQ HOSP IP/OBS MODERATE 35: CPT | Performed by: INTERNAL MEDICINE

## 2021-11-06 PROCEDURE — 85014 HEMATOCRIT: CPT | Performed by: INTERNAL MEDICINE

## 2021-11-06 PROCEDURE — 82962 GLUCOSE BLOOD TEST: CPT

## 2021-11-06 PROCEDURE — 84100 ASSAY OF PHOSPHORUS: CPT | Performed by: INTERNAL MEDICINE

## 2021-11-06 PROCEDURE — 82570 ASSAY OF URINE CREATININE: CPT | Performed by: INTERNAL MEDICINE

## 2021-11-06 PROCEDURE — 86923 COMPATIBILITY TEST ELECTRIC: CPT

## 2021-11-06 PROCEDURE — 63710000001 DIPHENHYDRAMINE PER 50 MG: Performed by: PHYSICIAN ASSISTANT

## 2021-11-06 PROCEDURE — 84300 ASSAY OF URINE SODIUM: CPT | Performed by: INTERNAL MEDICINE

## 2021-11-06 PROCEDURE — 82306 VITAMIN D 25 HYDROXY: CPT | Performed by: PHYSICIAN ASSISTANT

## 2021-11-06 PROCEDURE — 86850 RBC ANTIBODY SCREEN: CPT | Performed by: PHYSICIAN ASSISTANT

## 2021-11-06 PROCEDURE — 82728 ASSAY OF FERRITIN: CPT | Performed by: PHYSICIAN ASSISTANT

## 2021-11-06 PROCEDURE — 93005 ELECTROCARDIOGRAM TRACING: CPT | Performed by: PHYSICIAN ASSISTANT

## 2021-11-06 PROCEDURE — 82607 VITAMIN B-12: CPT | Performed by: PHYSICIAN ASSISTANT

## 2021-11-06 PROCEDURE — 80048 BASIC METABOLIC PNL TOTAL CA: CPT | Performed by: INTERNAL MEDICINE

## 2021-11-06 PROCEDURE — P9016 RBC LEUKOCYTES REDUCED: HCPCS

## 2021-11-06 PROCEDURE — 25010000002 HEPARIN (PORCINE) PER 1000 UNITS: Performed by: INTERNAL MEDICINE

## 2021-11-06 PROCEDURE — 36430 TRANSFUSION BLD/BLD COMPNT: CPT

## 2021-11-06 PROCEDURE — 86900 BLOOD TYPING SEROLOGIC ABO: CPT | Performed by: PHYSICIAN ASSISTANT

## 2021-11-06 RX ORDER — ACETAMINOPHEN 325 MG/1
650 TABLET ORAL ONCE
Status: COMPLETED | OUTPATIENT
Start: 2021-11-06 | End: 2021-11-06

## 2021-11-06 RX ORDER — CEFDINIR 300 MG/1
300 CAPSULE ORAL DAILY
Status: DISCONTINUED | OUTPATIENT
Start: 2021-11-06 | End: 2021-11-09 | Stop reason: HOSPADM

## 2021-11-06 RX ORDER — DIPHENHYDRAMINE HCL 25 MG
25 CAPSULE ORAL ONCE
Status: COMPLETED | OUTPATIENT
Start: 2021-11-06 | End: 2021-11-06

## 2021-11-06 RX ORDER — MAGNESIUM SULFATE HEPTAHYDRATE 40 MG/ML
2 INJECTION, SOLUTION INTRAVENOUS AS NEEDED
Status: DISCONTINUED | OUTPATIENT
Start: 2021-11-06 | End: 2021-11-09 | Stop reason: HOSPADM

## 2021-11-06 RX ORDER — MAGNESIUM SULFATE 1 G/100ML
1 INJECTION INTRAVENOUS AS NEEDED
Status: DISCONTINUED | OUTPATIENT
Start: 2021-11-06 | End: 2021-11-09 | Stop reason: HOSPADM

## 2021-11-06 RX ORDER — ACETAMINOPHEN 650 MG/1
650 SUPPOSITORY RECTAL ONCE
Status: COMPLETED | OUTPATIENT
Start: 2021-11-06 | End: 2021-11-06

## 2021-11-06 RX ORDER — ACETAMINOPHEN 160 MG/5ML
650 SOLUTION ORAL ONCE
Status: COMPLETED | OUTPATIENT
Start: 2021-11-06 | End: 2021-11-06

## 2021-11-06 RX ORDER — DIPHENHYDRAMINE HYDROCHLORIDE 50 MG/ML
25 INJECTION INTRAMUSCULAR; INTRAVENOUS ONCE
Status: COMPLETED | OUTPATIENT
Start: 2021-11-06 | End: 2021-11-06

## 2021-11-06 RX ORDER — MAGNESIUM SULFATE HEPTAHYDRATE 40 MG/ML
2 INJECTION, SOLUTION INTRAVENOUS ONCE
Status: COMPLETED | OUTPATIENT
Start: 2021-11-06 | End: 2021-11-06

## 2021-11-06 RX ORDER — CALCIUM ACETATE 667 MG/1
1334 CAPSULE ORAL
Status: DISCONTINUED | OUTPATIENT
Start: 2021-11-06 | End: 2021-11-09 | Stop reason: HOSPADM

## 2021-11-06 RX ORDER — MAGNESIUM SULFATE HEPTAHYDRATE 40 MG/ML
4 INJECTION, SOLUTION INTRAVENOUS AS NEEDED
Status: DISCONTINUED | OUTPATIENT
Start: 2021-11-06 | End: 2021-11-09 | Stop reason: HOSPADM

## 2021-11-06 RX ORDER — CALCITRIOL 0.25 UG/1
0.25 CAPSULE, LIQUID FILLED ORAL DAILY
Status: DISCONTINUED | OUTPATIENT
Start: 2021-11-06 | End: 2021-11-09 | Stop reason: HOSPADM

## 2021-11-06 RX ADMIN — TRAZODONE HYDROCHLORIDE 100 MG: 50 TABLET ORAL at 20:56

## 2021-11-06 RX ADMIN — INSULIN ASPART 2 UNITS: 100 INJECTION, SOLUTION INTRAVENOUS; SUBCUTANEOUS at 10:11

## 2021-11-06 RX ADMIN — CLOPIDOGREL 75 MG: 75 TABLET, FILM COATED ORAL at 07:56

## 2021-11-06 RX ADMIN — CALCIUM ACETATE 1334 MG: 667 CAPSULE ORAL at 16:18

## 2021-11-06 RX ADMIN — DIPHENHYDRAMINE HYDROCHLORIDE 25 MG: 25 CAPSULE ORAL at 09:39

## 2021-11-06 RX ADMIN — HYDROCODONE BITARTRATE AND ACETAMINOPHEN 1 TABLET: 10; 325 TABLET ORAL at 01:04

## 2021-11-06 RX ADMIN — ACETAMINOPHEN 650 MG: 160 SOLUTION ORAL at 09:39

## 2021-11-06 RX ADMIN — ROPINIROLE HYDROCHLORIDE 0.5 MG: 0.25 TABLET, FILM COATED ORAL at 20:56

## 2021-11-06 RX ADMIN — CETIRIZINE HYDROCHLORIDE 5 MG: 10 TABLET, FILM COATED ORAL at 07:56

## 2021-11-06 RX ADMIN — CARVEDILOL 12.5 MG: 6.25 TABLET, FILM COATED ORAL at 07:56

## 2021-11-06 RX ADMIN — SODIUM BICARBONATE 75 MEQ: 84 INJECTION, SOLUTION INTRAVENOUS at 17:16

## 2021-11-06 RX ADMIN — PRAVASTATIN SODIUM 20 MG: 40 TABLET ORAL at 07:56

## 2021-11-06 RX ADMIN — SODIUM CHLORIDE, PRESERVATIVE FREE 10 ML: 5 INJECTION INTRAVENOUS at 20:57

## 2021-11-06 RX ADMIN — CEFDINIR 300 MG: 300 CAPSULE ORAL at 16:18

## 2021-11-06 RX ADMIN — HYDROCODONE BITARTRATE AND ACETAMINOPHEN 1 TABLET: 10; 325 TABLET ORAL at 12:43

## 2021-11-06 RX ADMIN — ROPINIROLE HYDROCHLORIDE 0.5 MG: 0.25 TABLET, FILM COATED ORAL at 07:56

## 2021-11-06 RX ADMIN — PANTOPRAZOLE SODIUM 40 MG: 40 TABLET, DELAYED RELEASE ORAL at 05:25

## 2021-11-06 RX ADMIN — SODIUM BICARBONATE 75 MEQ: 84 INJECTION, SOLUTION INTRAVENOUS at 03:20

## 2021-11-06 RX ADMIN — LEVOTHYROXINE SODIUM 25 MCG: 25 TABLET ORAL at 07:56

## 2021-11-06 RX ADMIN — MAGNESIUM SULFATE 2 G: 2 INJECTION INTRAVENOUS at 20:57

## 2021-11-06 RX ADMIN — INSULIN ASPART 2 UNITS: 100 INJECTION, SOLUTION INTRAVENOUS; SUBCUTANEOUS at 16:29

## 2021-11-06 RX ADMIN — CARVEDILOL 12.5 MG: 6.25 TABLET, FILM COATED ORAL at 16:18

## 2021-11-06 RX ADMIN — CALCITRIOL 0.25 MCG: 0.25 CAPSULE ORAL at 16:18

## 2021-11-06 RX ADMIN — HEPARIN SODIUM 5000 UNITS: 5000 INJECTION INTRAVENOUS; SUBCUTANEOUS at 20:56

## 2021-11-06 NOTE — PLAN OF CARE
Goal Outcome Evaluation:              Outcome Summary: Patient complained of pain PRN medication given, patients 02 Sat dropped <88% placed on 2L NC O2 Sat Currently 96%, Will continue to monitor.

## 2021-11-06 NOTE — PLAN OF CARE
Goal Outcome Evaluation:               Pt resting with no complaints at this time. Pt premedicated and received 1 unit of packed red blood cells. H&H pending at this time. Pt  at bedside. Nephrologist seen pt see new orders.

## 2021-11-06 NOTE — PROGRESS NOTES
[facility] HOSPITALIST PROGRESS NOTE     Patient Identification:  Name:  Reny Elena  Age:  61 y.o.  Sex:  female  :  1960  MRN:  [id number]  Visit Number:  [id number]  ROOM: [address] [place] [address]     Primary Care Provider:  Susan Stephens APRN     Date of Admission: 2021    Length of stay in inpatient status:  1    Subjective     Chief Compliant:    Chief Complaint   Patient presents with   • Abnormal Lab     History of Presenting Illness:  61-year-old female who was admitted on 2021 for acute kidney injury on top of chronic kidney disease.  The patient was started on a 75 meq bicarbonate drip.  So far, the creatinine has improved.  The patient is urinating well.  She denies any blood loss in her urine and stools.  She denies any hematemesis.  She also denies trouble breathing, chest pain, coughing, nausea, and vomiting.    Objective     Current Hospital Meds:carvedilol, 12.5 mg, Oral, BID With Meals  cefdinir, 300 mg, Oral, Q12H  cetirizine, 5 mg, Oral, Daily  clopidogrel, 75 mg, Oral, Daily  gabapentin, 300 mg, Oral, Nightly  heparin (porcine), 5,000 Units, Subcutaneous, Q12H  insulin aspart, 0-7 Units, Subcutaneous, TID AC  levothyroxine, 25 mcg, Oral, Daily  pantoprazole, 40 mg, Oral, Q AM  pravastatin, 20 mg, Oral, Daily  rOPINIRole, 0.5 mg, Oral, BID  sodium chloride, 10 mL, Intravenous, Q12H  traZODone, 100 mg, Oral, Nightly    Pharmacy Consult,   sodium bicarbonate drip (greater than 75 mEq/bag), 75 mEq      Current Antimicrobial Therapy:  Anti-Infectives (From admission, onward)    Ordered     Dose/Rate Route Frequency Start Stop    21 1226  cefdinir (OMNICEF) capsule 300 mg        Ordering Provider: Vinny Sandoval MD    300 mg Oral Every 12 Hours Scheduled 21 1315 21 0859        Current Diuretic Therapy:  Diuretics (From admission, onward)    None         ----------------------------------------------------------------------------------------------------------------------  Vital Signs:  Temp:  [98 °F (36.7 °C)-100.2 °F (37.9 °C)] 98.3 °F (36.8 °C)  Heart Rate:  [77-89] 77  Resp:  [18-20] 18  BP: (111-155)/(54-82) 135/66  SpO2:  [94 %-98 %] 94 %  on   ;   Device (Oxygen Therapy): room air  Body mass index is 21.32 kg/m².    Wt Readings from Last 3 Encounters:   11/06/21 58.1 kg (128 lb 1.6 oz)   06/23/20 53.1 kg (117 lb)   01/23/20 54.9 kg (121 lb)     Intake & Output (last 3 days)       11/03 0701 11/04 0700 11/04 0701 11/05 0700 11/05 0701 11/06 0700 11/06 0701 11/07 0700    P.O.   1200 240    I.V. (mL/kg)   1826.4 (31.4)     Total Intake(mL/kg)   3026.4 (52.1) 240 (4.1)    Urine (mL/kg/hr)   700 (0.5)     Total Output   700     Net   +2326.4 +240            Urine Unmeasured Occurrence   4 x         Diet Regular; Renal, Consistent Carbohydrate  ----------------------------------------------------------------------------------------------------------------------  Physical Exam  Vitals reviewed.   Constitutional:       General: She is not in acute distress.     Appearance: Normal appearance. She is not ill-appearing, toxic-appearing or diaphoretic.   HENT:      Head: Normocephalic and atraumatic.      Right Ear: External ear normal.      Left Ear: External ear normal.      Nose: Nose normal.   Eyes:      General: No scleral icterus.     Pupils: Pupils are equal, round, and reactive to light.   Cardiovascular:      Rate and Rhythm: Normal rate and regular rhythm.      Pulses: Normal pulses.      Heart sounds: No murmur heard.      Pulmonary:      Effort: Pulmonary effort is normal. No respiratory distress.      Breath sounds: No wheezing or rales.   Skin:     Coloration: Skin is not pale.      Findings: No bruising.   Neurological:      Mental Status: She is alert and oriented to person, place, and time. Mental status is at baseline.      Cranial Nerves: No  cranial nerve deficit.   Psychiatric:         Mood and Affect: Mood normal.         Behavior: Behavior normal. Behavior is cooperative.     ----------------------------------------------------------------------------------------------------------------------  Tele: Normal sinus rhythm with heart rate 70s to 80s.  I personally reviewed the telemetry strips.  ----------------------------------------------------------------------------------------------------------------------  LABS:        CBC and coagulation:  Results from last 7 days   Lab Units 11/06/21  0553 11/05/21  0719 11/04/21  1523   WBC 10*3/mm3 6.43 5.31 4.45   HEMOGLOBIN g/dL 6.4* 7.7* 8.0*   HEMATOCRIT % 20.7* 25.6* 25.9*   MCV fL 98.6* 100.8* 99.2*   MCHC g/dL 30.9* 30.1* 30.9*   PLATELETS 10*3/mm3 88* 110* 114*     Renal and electrolytes:  Results from last 7 days   Lab Units 11/06/21  0437 11/05/21 0719 11/05/21  0318 11/04/21  2220 11/04/21  1523 11/04/21  1233   SODIUM mmol/L 138 145 143 146* 141 140   POTASSIUM mmol/L 4.6 4.7 4.9 5.1 6.0* 6.0*   MAGNESIUM mg/dL 1.6  --   --   --  2.0  --    CHLORIDE mmol/L 107 114* 111* 112* 107 111*   CO2 mmol/L 17.5* 17.8* 18.1* 19.9* 17.6* 16.0*   BUN mg/dL 55* 62* 65* 64* 65* 64*   CREATININE mg/dL 3.54* 3.95* 4.26* 4.56* 4.57* 4.29*   EGFR IF NONAFRICN AM mL/min/1.73 13* 11* 11* 10* 10* 10*   CALCIUM mg/dL 6.4* 7.3* 7.2* 7.6* 7.6* 7.5*   IONIZED CALCIUM mmol/L 0.90*  --   --   --   --   --    PHOSPHORUS mg/dL 6.2*  --   --   --   --   --    GLUCOSE mg/dL 87 62* 112* 53* 129* 123*     Estimated Creatinine Clearance: 14.9 mL/min (A) (by C-G formula based on SCr of 3.54 mg/dL (H)).    Liver and pancreatic function:  Results from last 7 days   Lab Units 11/05/21  0719 11/04/21  1523 11/04/21  1233   ALBUMIN g/dL 3.46* 4.02 3.90   BILIRUBIN mg/dL <0.2 0.2 <0.2   ALK PHOS U/L 89 101 94   AST (SGOT) U/L 13 14 12   ALT (SGPT) U/L 10 10 10     Endocrine function:  Lab Results   Component Value Date    HGBA1C 6.40 (H)  11/04/2021     Point of care bedside glucose levels:  Results from last 7 days   Lab Units 11/06/21  1008 11/06/21  0535 11/05/21  2128 11/05/21  1707 11/05/21  1058 11/05/21  0852 11/05/21  0745 11/04/21  1726   GLUCOSE mg/dL 180* 93 198* 197* 248* 145* 59* 152*     Glucose levels from the [facility]:  Results from last 7 days   Lab Units 11/06/21  0437 11/05/21  0719 11/05/21  0318 11/04/21  2220 11/04/21  1523 11/04/21  1233   GLUCOSE mg/dL 87 62* 112* 53* 129* 123*     Lab Results   Component Value Date    TSH 4.940 (H) 11/05/2021    FREET4 0.87 (L) 11/05/2021     Cardiac:  Results from last 7 days   Lab Units 11/05/21  0318 11/04/21  2220 11/04/21  1523   TROPONIN T ng/mL 0.025 0.027 0.034*       Cultures:  Lab Results   Component Value Date    COLORU Yellow 11/04/2021    CLARITYU Clear 11/04/2021    PHUR <=5.0 11/04/2021    PROTEINUR 401.4 04/02/2021    GLUCOSEU Negative 11/04/2021    KETONESU Negative 11/04/2021    BLOODU Trace (A) 11/04/2021    NITRITEU Negative 11/04/2021    LEUKOCYTESUR Trace (A) 11/04/2021    BILIRUBINUR Negative 11/04/2021    UROBILINOGEN 0.2 E.U./dL 11/04/2021    RBCUA 0-2 11/04/2021    WBCUA 6-12 (A) 11/04/2021    BACTERIA 1+ (A) 11/04/2021     Microbiology Results (last 10 days)     Procedure Component Value - Date/Time    COVID-19 and FLU A/B PCR - Swab, Nasopharynx [[id number]]  (Normal) Collected: 11/04/21 1915    Lab Status: Final result Specimen: Swab from Nasopharynx Updated: 11/04/21 2009     COVID19 Not Detected     Influenza A PCR Not Detected     Influenza B PCR Not Detected    Narrative:      Fact sheet for providers: https://www.fda.gov/media/836660/download    Fact sheet for patients: https://www.fda.gov/media/159106/download    Test performed by PCR.    Urine Culture - Urine, Urine, Clean Catch [[id number]]  (Abnormal)  (Susceptibility) Collected: 11/04/21 1806    Lab Status: Final result Specimen: Urine, Clean Catch Updated: 11/06/21 1017     Urine Culture >100,000 CFU/mL  Escherichia coli    Susceptibility      Escherichia coli     DAVID     Ampicillin Resistant     Ampicillin + Sulbactam Intermediate     Cefazolin Susceptible     Cefepime Susceptible     Ceftazidime Susceptible     Ceftriaxone Susceptible     Gentamicin Susceptible     Levofloxacin Intermediate     Nitrofurantoin Susceptible     Piperacillin + Tazobactam Susceptible     Tetracycline Susceptible     Trimethoprim + Sulfamethoxazole Resistant                           I have personally looked at the labs and they are summarized above.    Assessment & Plan      -Acute on chronic stage IV CKD, baseline creatinine previously 1.0-1.2 in 2019, 1.7-2 in 2020, and the only lab from 2021 available to review is from 4/2021 with creatinine of 2.47  -Elevated anion gap metabolic acidosis due to the renal failure  -Renal osteodystrophy  -Anemia of chronic disease due to chronic kidney disease stage IV  -Hyperkalemia, suspect due to renal failure, improved  -Mild troponinemia, suspect due to renal failure  -E coli UTI  -History of Type II diabetes mellitus, insulin dependent   -History of essential hypertension  -History of hyperlipidemia  -History of combined systolic (EF 46-50%) and diastolic (grade I) CHF, compensated and without an acute exacerbation at this time  -History of mild mitral valve regurgitation   -History of mild aortic valve regurgitation     Nephrology saw the patient today; PhosLo has been added as has calcitriol.  We will continue with the IV fluids overnight but I have cut them in half.  Patient is receiving 1 unit of packed red blood cells for the anemia of chronic disease; I do not suspect that she is bleeding anywhere, rather that she had hemodilution from being dehydrated.  We will continue to monitor her for any of overt bleeding and we will continue to trend the hemoglobin.  She also has thrombocytopenia that has slowly developed over time; we will send peripheral smear in the morning.  For her elevated  glucose levels, I have added 5 units of NovoLog to meals and have increased in the sliding scale NovoLog slightly.  I will start her on Omnicef for the E. coli UTI.  We will repeat blood work in the morning.  The patient's creatinine is still improved, then we will send her home with follow-up with Dr. Sandy.  According to the  is at bedside, she has an appointment with Dr. Sandy on November 10, 2021.    VTE Prophylaxis:   Mechanical Order History:     None      Pharmalogical Order History:      Ordered     Dose Route Frequency Stop    11/05/21 0643  heparin (porcine) 5000 UNIT/ML injection 5,000 Units         5,000 Units SC Every 12 Hours Scheduled --              Disposition: Home, hopefully tomorrow    Vinny Sandoval MD  [facility] Hospitalist  11/06/21  12:27 EDT

## 2021-11-06 NOTE — CONSULTS
Nephrology Consultation Note    Referring Provider: Dr. Sandoval  Reason for Consultation: Acute renal failure    Chief complaint abnormal labs    Subjective .     History of present illness: Patient with presumed diabetic nephropathy for nephrotic range proteinuria with negative serologies for lupus and vasculitis and myeloma and hypocomplementemia was noted to have abnormal labs and referred to the emergency room.  Here she was noted to have a creatinine of over 4 with a potassium of 6 and a pH of 7.22.  Patient says that she does not take any diuretics at home and does not take lisinopril which was listed by her pharmacy.  For her blood pressure she takes clonidine and carvedilol.  Patient reports no nonsteroidal drug use.  Has been eating and drinking well.  Has had no vomiting or diarrhea.  She does drink 2 dark katrina a day.  She has not been oliguric.  She has not run a fever.  She has not had blood loss from anywhere.    With treatment her creatinine and potassium are better.  She remains asymptomatic.    Hemodynamic data reviewed     Medications with potential for nephrotoxicity received include none    All other systems were reviewed and negative.    History  Past Medical History:   Diagnosis Date   • Arthritis    • Closed fracture of right fibula and tibia 2018   • Depression    • Diabetic ulcer of left foot associated with type 2 diabetes mellitus ([facility]) 2017   • Diastolic dysfunction    • Essential hypertension    • Hyperlipidemia    • Iron deficiency anemia 2018   • PAD (peripheral artery disease) ([facility])    • Type 2 diabetes mellitus with hyperglycemia, with long-term current use of insulin ([facility]) 2018   ,   Past Surgical History:   Procedure Laterality Date   • BACK SURGERY      Post spinal diskectomy, osteophytectomy in one lumbar interspace   • CATARACT EXTRACTION      Left    •  SECTION     • DENTAL PROCEDURE     •  HYSTERECTOMY     • INCISION AND DRAINAGE LEG Left 4/15/2017    Procedure: INCISION AND DRAINAGE LOWER EXTREMITY;  Surgeon: Basilio Morris MD;  Location: [facility] OR;  Service:    • INCISION AND DRAINAGE LEG Left 5/26/2017    Procedure: Irrigation and Debridment abcess diabetic wound left foot with deep culture;  Surgeon: Basilio Morris MD;  Location: [facility] OR;  Service:    • KNEE ARTHROSCOPY Right    • ORIF TIBIA FRACTURE Right 12/28/2018    Procedure: TIBIAL  FRACTURE OPEN REDUCTION INTERNAL FIXATION;  Surgeon: Jung Barragan MD;  Location: [facility] OR;  Service: Orthopedics   • TOE AMPUTATION Right     5th   • TUBAL ABDOMINAL LIGATION     ,   Family History   Problem Relation Age of Onset   • Heart disease Mother    • Cancer Mother    • COPD Mother    • Diabetes Other    • Depression Other    ,   Social History     Tobacco Use   • Smoking status: Never Smoker   • Smokeless tobacco: Never Used   Substance Use Topics   • Alcohol use: No   • Drug use: No    and Allergies:  Penicillins, Codeine, and Sulfa antibiotics      Vital Signs   Temp:  [98 °F (36.7 °C)-98.9 °F (37.2 °C)] 98.2 °F (36.8 °C)  Heart Rate:  [77-89] 83  Resp:  [18-20] 18  BP: (111-188)/(54-90) 159/76    Physical Exam:     General Appearance:    Alert, cooperative, in no acute distress, oriented times three   Head:    Normocephalic, without obvious abnormality   Eyes:            Conjunctivae and sclerae normal, no icterus, no pallor, pupils CCERLA   Ears:    Ears appear intact    Throat:   No oral lesions, no thrush, oral mucosa moist   Neck:    no JVD   Back:    no tenderness to percussion, range of motion normal   Lungs:     Clear to auscultation,respirations regular, even and                  unlabored    Heart:    Regular rhythm and normal rate, normal S1 and S2   Chest Wall:    No abnormalities observed   Abdomen:     Normal bowel sounds, no tenderness   Rectal:     Deferred   Extremities:  Trace edema   Pulses:   Pulses palpable and equal  bilaterally   Skin:   No petechiae       Neurologic:   Cr Ns grossly intact,  moves all extremities.     Results Review:   I reviewed the patient's new clinical results.      Lab Results (last 24 hours)     Procedure Component Value Units Date/Time    Vitamin D 25 Hydroxy [[id number]]  (Abnormal) Collected: 11/06/21 0437    Specimen: Blood Updated: 11/06/21 1400     25 Hydroxy, Vitamin D 26.2 ng/ml     Narrative:      Reference Range for Total Vitamin D 25(OH)     Deficiency <20.0 ng/mL   Insufficiency 21-29 ng/mL   Sufficiency  ng/mL  Toxicity >100 ng/ml    Results may be falsely increased if patient taking Biotin.      Folate [[id number]]  (Normal) Collected: 11/06/21 0437    Specimen: Blood Updated: 11/06/21 1400     Folate 17.60 ng/mL     Narrative:      Results may be falsely increased if patient taking Biotin.      Vitamin B12 [[id number]]  (Normal) Collected: 11/06/21 0437    Specimen: Blood Updated: 11/06/21 1400     Vitamin B-12 274 pg/mL     Narrative:      Results may be falsely increased if patient taking Biotin.      Urine Culture - Urine, Urine, Clean Catch [[id number]]  (Abnormal)  (Susceptibility) Collected: 11/04/21 1806    Specimen: Urine, Clean Catch Updated: 11/06/21 1017     Urine Culture >100,000 CFU/mL Escherichia coli    Susceptibility      Escherichia coli     DAVID     Ampicillin Resistant     Ampicillin + Sulbactam Intermediate     Cefazolin Susceptible     Cefepime Susceptible     Ceftazidime Susceptible     Ceftriaxone Susceptible     Gentamicin Susceptible     Levofloxacin Intermediate     Nitrofurantoin Susceptible     Piperacillin + Tazobactam Susceptible     Tetracycline Susceptible     Trimethoprim + Sulfamethoxazole Resistant                  Linear View                   POC Glucose Once [[id number]]  (Abnormal) Collected: 11/06/21 1008    Specimen: Blood Updated: 11/06/21 1015     Glucose 180 mg/dL      Comment: Meter: [id number] : [id number] Mary Kay JONES       CBC (No  Diff) [[id number]]  (Abnormal) Collected: 11/06/21 0553    Specimen: Blood Updated: 11/06/21 0643     WBC 6.43 10*3/mm3      RBC 2.10 10*6/mm3      Hemoglobin 6.4 g/dL      Hematocrit 20.7 %      MCV 98.6 fL      MCH 30.5 pg      MCHC 30.9 g/dL      RDW 14.1 %      RDW-SD 50.7 fl      MPV 11.2 fL      Platelets 88 10*3/mm3     POC Glucose Once [[id number]]  (Normal) Collected: 11/06/21 0535    Specimen: Blood Updated: 11/06/21 0551     Glucose 93 mg/dL      Comment: Meter: [id number] : [id number] CAYETANO PEPPER       Ferritin [[id number]]  (Normal) Collected: 11/06/21 0437    Specimen: Blood Updated: 11/06/21 0511     Ferritin 115.30 ng/mL     Narrative:      Results may be falsely decreased if patient taking Biotin.      Basic Metabolic Panel [[id number]]  (Abnormal) Collected: 11/06/21 0437    Specimen: Blood Updated: 11/06/21 0505     Glucose 87 mg/dL      BUN 55 mg/dL      Creatinine 3.54 mg/dL      Sodium 138 mmol/L      Potassium 4.6 mmol/L      Chloride 107 mmol/L      CO2 17.5 mmol/L      Calcium 6.4 mg/dL      eGFR   Amer --     Comment: <15 Indicative of kidney failure.        eGFR Non African Amer 13 mL/min/1.73      Comment: <15 Indicative of kidney failure.        BUN/Creatinine Ratio 15.5     Anion Gap 13.5 mmol/L     Narrative:      GFR Normal >60  Chronic Kidney Disease <60  Kidney Failure <15      Iron Profile [[id number]]  (Abnormal) Collected: 11/06/21 0437    Specimen: Blood Updated: 11/06/21 0505     Iron 46 mcg/dL      Iron Saturation 22 %      Transferrin 141 mg/dL      TIBC 210 mcg/dL     Phosphorus [[id number]]  (Abnormal) Collected: 11/06/21 0437    Specimen: Blood Updated: 11/06/21 0505     Phosphorus 6.2 mg/dL     Magnesium [[id number]]  (Normal) Collected: 11/06/21 0437    Specimen: Blood Updated: 11/06/21 0505     Magnesium 1.6 mg/dL     PTH, Intact [[id number]]  (Abnormal) Collected: 11/06/21 0437    Specimen: Blood Updated: 11/06/21 0503     PTH, Intact 369.3 pg/mL     Narrative:       Please note the potential for biotin to falsely depress the PTH result when high levels of biotin surpassing the daily recommended dose are administered.    Results may be falsely decreased if patient taking Biotin.      Calcium, Ionized [[id number]]  (Abnormal) Collected: 11/06/21 0437    Specimen: Blood Updated: 11/06/21 0501     Ionized Calcium 0.90 mmol/L     POC Glucose Once [[id number]]  (Abnormal) Collected: 11/05/21 2128    Specimen: Blood Updated: 11/05/21 2134     Glucose 198 mg/dL      Comment: Meter: [id number] : [id number] YOMAIRA GROSS       POC Glucose Once [[id number]]  (Abnormal) Collected: 11/05/21 1707    Specimen: Blood Updated: 11/05/21 1714     Glucose 197 mg/dL      Comment: Meter: [id number] : [id number] paul bob             Imaging Results (Last 24 Hours)     Procedure Component Value Units Date/Time    US Renal Bilateral [[id number]] Collected: 11/06/21 1432     Updated: 11/06/21 1434    Narrative:      US Renal Comp    INDICATION:   Acute on chronic renal failure.    COMPARISON:   CT abdomen and pelvis dated 8/5/2019    FINDINGS:   KIDNEYS:  The right kidney measures 10.6 cm. The left kidney measures 9.6 cm. Renal cortical thickness and echogenicity is normal.  No hydronephrosis. No abnormal masses.          Impression:      Renal sizes as described. No hydronephrosis.    Signer Name: Joel Castrejon MD   Signed: 11/6/2021 2:32 PM   Workstation Name: [id number]    [facility]                    Assessment and Plan:    1.  Acute renal failure on CKD stage IV  2.  Metabolic acidosis  3.  Respiratory acidosis  4.  Hyperkalemia  5.  Anemia of chronic disease  6.  Secondary hyperparathyroidism  7.  Type 2 diabetes with nephropathy not biopsy-proven poor control  8.  Hypertension    Her creatinine is improving.  Her acidemia is better as is her hyperkalemia with a bicarbonate drip that was initiated last night.  Will add PhosLo.  Will add calcitriol.  Counseled  not to drink katrina.  No imaging at this point.  Will reassess in the a.m. and decide          Han Sandy MD  11/06/21  15:12 EDT

## 2021-11-07 ENCOUNTER — APPOINTMENT (OUTPATIENT)
Dept: CARDIOLOGY | Facility: HOSPITAL | Age: 61
End: 2021-11-07

## 2021-11-07 LAB
ANION GAP SERPL CALCULATED.3IONS-SCNC: 12.9 MMOL/L (ref 5–15)
ATMOSPHERIC PRESS: 731 MMHG
BASE EXCESS BLDV CALC-SCNC: -0.1 MMOL/L (ref 0–2)
BDY SITE: ABNORMAL
BH BB BLOOD EXPIRATION DATE: NORMAL
BH BB BLOOD TYPE BARCODE: 5100
BH BB DISPENSE STATUS: NORMAL
BH BB PRODUCT CODE: NORMAL
BH BB UNIT NUMBER: NORMAL
BH CV ECHO MEAS - ACS: 1.6 CM
BH CV ECHO MEAS - AO MAX PG: 14.7 MMHG
BH CV ECHO MEAS - AO MEAN PG: 8 MMHG
BH CV ECHO MEAS - AO ROOT AREA (BSA CORRECTED): 1.7
BH CV ECHO MEAS - AO ROOT AREA: 6.2 CM^2
BH CV ECHO MEAS - AO ROOT DIAM: 2.8 CM
BH CV ECHO MEAS - AO V2 MAX: 192 CM/SEC
BH CV ECHO MEAS - AO V2 MEAN: 128 CM/SEC
BH CV ECHO MEAS - AO V2 VTI: 42.9 CM
BH CV ECHO MEAS - BSA(HAYCOCK): 1.6 M^2
BH CV ECHO MEAS - BSA: 1.6 M^2
BH CV ECHO MEAS - BZI_BMI: 21.5 KILOGRAMS/M^2
BH CV ECHO MEAS - BZI_METRIC_HEIGHT: 165.1 CM
BH CV ECHO MEAS - BZI_METRIC_WEIGHT: 58.5 KG
BH CV ECHO MEAS - EDV(CUBED): 42.9 ML
BH CV ECHO MEAS - EDV(MOD-SP4): 59.8 ML
BH CV ECHO MEAS - EDV(TEICH): 50.9 ML
BH CV ECHO MEAS - EF(CUBED): 75.2 %
BH CV ECHO MEAS - EF(MOD-SP4): 82.9 %
BH CV ECHO MEAS - EF(TEICH): 68.1 %
BH CV ECHO MEAS - ESV(CUBED): 10.6 ML
BH CV ECHO MEAS - ESV(MOD-SP4): 10.2 ML
BH CV ECHO MEAS - ESV(TEICH): 16.2 ML
BH CV ECHO MEAS - FS: 37.1 %
BH CV ECHO MEAS - IVS/LVPW: 1.1
BH CV ECHO MEAS - IVSD: 1.8 CM
BH CV ECHO MEAS - LA DIMENSION: 3.6 CM
BH CV ECHO MEAS - LA/AO: 1.3
BH CV ECHO MEAS - LV DIASTOLIC VOL/BSA (35-75): 36.4 ML/M^2
BH CV ECHO MEAS - LV MASS(C)D: 238.2 GRAMS
BH CV ECHO MEAS - LV MASS(C)DI: 145.1 GRAMS/M^2
BH CV ECHO MEAS - LV SYSTOLIC VOL/BSA (12-30): 6.2 ML/M^2
BH CV ECHO MEAS - LVIDD: 3.5 CM
BH CV ECHO MEAS - LVIDS: 2.2 CM
BH CV ECHO MEAS - LVLD AP4: 6.7 CM
BH CV ECHO MEAS - LVLS AP4: 5.6 CM
BH CV ECHO MEAS - LVOT AREA (M): 2.5 CM^2
BH CV ECHO MEAS - LVOT AREA: 2.5 CM^2
BH CV ECHO MEAS - LVOT DIAM: 1.8 CM
BH CV ECHO MEAS - LVPWD: 1.6 CM
BH CV ECHO MEAS - MV A MAX VEL: 115 CM/SEC
BH CV ECHO MEAS - MV E MAX VEL: 111 CM/SEC
BH CV ECHO MEAS - MV E/A: 0.97
BH CV ECHO MEAS - PA ACC TIME: 0.1 SEC
BH CV ECHO MEAS - PA PR(ACCEL): 34.5 MMHG
BH CV ECHO MEAS - RAP SYSTOLE: 10 MMHG
BH CV ECHO MEAS - RVSP: 36.8 MMHG
BH CV ECHO MEAS - SI(AO): 160.9 ML/M^2
BH CV ECHO MEAS - SI(CUBED): 19.6 ML/M^2
BH CV ECHO MEAS - SI(MOD-SP4): 30.2 ML/M^2
BH CV ECHO MEAS - SI(TEICH): 21.1 ML/M^2
BH CV ECHO MEAS - SV(AO): 264.2 ML
BH CV ECHO MEAS - SV(CUBED): 32.2 ML
BH CV ECHO MEAS - SV(MOD-SP4): 49.6 ML
BH CV ECHO MEAS - SV(TEICH): 34.7 ML
BH CV ECHO MEAS - TR MAX VEL: 259 CM/SEC
BUN SERPL-MCNC: 54 MG/DL (ref 8–23)
BUN/CREAT SERPL: 15.7 (ref 7–25)
CALCIUM SPEC-SCNC: 7 MG/DL (ref 8.6–10.5)
CHLORIDE SERPL-SCNC: 106 MMOL/L (ref 98–107)
CO2 BLDA-SCNC: 27.6 MMOL/L (ref 22–33)
CO2 SERPL-SCNC: 20.1 MMOL/L (ref 22–29)
COHGB MFR BLD: 1.7 % (ref 0–5)
CREAT SERPL-MCNC: 3.45 MG/DL (ref 0.57–1)
CREAT UR-MCNC: 24.4 MG/DL
CREAT UR-MCNC: 24.4 MG/DL
CROSSMATCH INTERPRETATION: NORMAL
DEPRECATED RDW RBC AUTO: 50.2 FL (ref 37–54)
ERYTHROCYTE [DISTWIDTH] IN BLOOD BY AUTOMATED COUNT: 14.4 % (ref 12.3–15.4)
GAS FLOW AIRWAY: 1 LPM
GFR SERPL CREATININE-BSD FRML MDRD: 13 ML/MIN/1.73
GFR SERPL CREATININE-BSD FRML MDRD: ABNORMAL ML/MIN/{1.73_M2}
GLUCOSE BLDC GLUCOMTR-MCNC: 154 MG/DL (ref 70–130)
GLUCOSE BLDC GLUCOMTR-MCNC: 162 MG/DL (ref 70–130)
GLUCOSE BLDC GLUCOMTR-MCNC: 219 MG/DL (ref 70–130)
GLUCOSE BLDC GLUCOMTR-MCNC: 241 MG/DL (ref 70–130)
GLUCOSE SERPL-MCNC: 157 MG/DL (ref 65–99)
HCO3 BLDV-SCNC: 26.1 MMOL/L (ref 22–28)
HCT VFR BLD AUTO: 22 % (ref 34–46.6)
HCT VFR BLD AUTO: 30.4 % (ref 34–46.6)
HGB BLD-MCNC: 6.9 G/DL (ref 12–15.9)
HGB BLD-MCNC: 9.8 G/DL (ref 12–15.9)
HGB BLDA-MCNC: 7.3 G/DL (ref 13.5–17.5)
INHALED O2 CONCENTRATION: 24 %
LV EF 2D ECHO EST: 60 %
Lab: ABNORMAL
MAGNESIUM SERPL-MCNC: 2.1 MG/DL (ref 1.6–2.4)
MAXIMAL PREDICTED HEART RATE: 157 BPM
MCH RBC QN AUTO: 30.3 PG (ref 26.6–33)
MCHC RBC AUTO-ENTMCNC: 31.4 G/DL (ref 31.5–35.7)
MCV RBC AUTO: 96.5 FL (ref 79–97)
METHGB BLD QL: 0.5 % (ref 0–3)
MODALITY: ABNORMAL
OXYHGB MFR BLDV: 91.9 % (ref 45–75)
PCO2 BLDV: 50.3 MM HG (ref 41–51)
PH BLDV: 7.32 PH UNITS (ref 7.32–7.42)
PHOSPHATE SERPL-MCNC: 5.7 MG/DL (ref 2.5–4.5)
PLATELET # BLD AUTO: 84 10*3/MM3 (ref 140–450)
PMV BLD AUTO: 11.3 FL (ref 6–12)
PO2 BLDV: 66.6 MM HG (ref 27–53)
POTASSIUM SERPL-SCNC: 4.6 MMOL/L (ref 3.5–5.2)
PROT UR-MCNC: 200 MG/DL
PROT/CREAT UR: 8196.7 MG/G CREA (ref 0–200)
RBC # BLD AUTO: 2.28 10*6/MM3 (ref 3.77–5.28)
SAO2 % BLDCOV: 94 % (ref 45–75)
SODIUM SERPL-SCNC: 139 MMOL/L (ref 136–145)
STRESS TARGET HR: 133 BPM
UNIT  ABO: NORMAL
UNIT  RH: NORMAL
VENTILATOR MODE: ABNORMAL
WBC # BLD AUTO: 6 10*3/MM3 (ref 3.4–10.8)

## 2021-11-07 PROCEDURE — 86900 BLOOD TYPING SEROLOGIC ABO: CPT

## 2021-11-07 PROCEDURE — 85014 HEMATOCRIT: CPT | Performed by: INTERNAL MEDICINE

## 2021-11-07 PROCEDURE — 85027 COMPLETE CBC AUTOMATED: CPT | Performed by: INTERNAL MEDICINE

## 2021-11-07 PROCEDURE — 80048 BASIC METABOLIC PNL TOTAL CA: CPT | Performed by: INTERNAL MEDICINE

## 2021-11-07 PROCEDURE — 84100 ASSAY OF PHOSPHORUS: CPT | Performed by: INTERNAL MEDICINE

## 2021-11-07 PROCEDURE — 93306 TTE W/DOPPLER COMPLETE: CPT

## 2021-11-07 PROCEDURE — 99232 SBSQ HOSP IP/OBS MODERATE 35: CPT | Performed by: INTERNAL MEDICINE

## 2021-11-07 PROCEDURE — 25010000002 HYDRALAZINE PER 20 MG: Performed by: INTERNAL MEDICINE

## 2021-11-07 PROCEDURE — 85060 BLOOD SMEAR INTERPRETATION: CPT | Performed by: INTERNAL MEDICINE

## 2021-11-07 PROCEDURE — 85018 HEMOGLOBIN: CPT | Performed by: INTERNAL MEDICINE

## 2021-11-07 PROCEDURE — P9016 RBC LEUKOCYTES REDUCED: HCPCS

## 2021-11-07 PROCEDURE — 94799 UNLISTED PULMONARY SVC/PX: CPT

## 2021-11-07 PROCEDURE — 93306 TTE W/DOPPLER COMPLETE: CPT | Performed by: INTERNAL MEDICINE

## 2021-11-07 PROCEDURE — 25010000002 ONDANSETRON PER 1 MG: Performed by: PHYSICIAN ASSISTANT

## 2021-11-07 PROCEDURE — 36430 TRANSFUSION BLD/BLD COMPNT: CPT

## 2021-11-07 PROCEDURE — 82820 HEMOGLOBIN-OXYGEN AFFINITY: CPT

## 2021-11-07 PROCEDURE — 25010000002 HEPARIN (PORCINE) PER 1000 UNITS: Performed by: INTERNAL MEDICINE

## 2021-11-07 PROCEDURE — 82805 BLOOD GASES W/O2 SATURATION: CPT

## 2021-11-07 PROCEDURE — 82962 GLUCOSE BLOOD TEST: CPT

## 2021-11-07 PROCEDURE — 63710000001 INSULIN ASPART PER 5 UNITS: Performed by: PHYSICIAN ASSISTANT

## 2021-11-07 PROCEDURE — 83735 ASSAY OF MAGNESIUM: CPT | Performed by: INTERNAL MEDICINE

## 2021-11-07 RX ORDER — ISOSORBIDE MONONITRATE 30 MG/1
30 TABLET, EXTENDED RELEASE ORAL
Status: DISCONTINUED | OUTPATIENT
Start: 2021-11-07 | End: 2021-11-09 | Stop reason: HOSPADM

## 2021-11-07 RX ORDER — HYDRALAZINE HYDROCHLORIDE 25 MG/1
25 TABLET, FILM COATED ORAL EVERY 8 HOURS SCHEDULED
Status: DISCONTINUED | OUTPATIENT
Start: 2021-11-07 | End: 2021-11-08

## 2021-11-07 RX ORDER — HYDRALAZINE HYDROCHLORIDE 20 MG/ML
10 INJECTION INTRAMUSCULAR; INTRAVENOUS EVERY 6 HOURS PRN
Status: DISCONTINUED | OUTPATIENT
Start: 2021-11-07 | End: 2021-11-09 | Stop reason: HOSPADM

## 2021-11-07 RX ADMIN — ISOSORBIDE MONONITRATE 30 MG: 30 TABLET, EXTENDED RELEASE ORAL at 08:57

## 2021-11-07 RX ADMIN — HYDROCODONE BITARTRATE AND ACETAMINOPHEN 1 TABLET: 10; 325 TABLET ORAL at 01:53

## 2021-11-07 RX ADMIN — CALCIUM ACETATE 1334 MG: 667 CAPSULE ORAL at 16:55

## 2021-11-07 RX ADMIN — SODIUM CHLORIDE, PRESERVATIVE FREE 10 ML: 5 INJECTION INTRAVENOUS at 08:13

## 2021-11-07 RX ADMIN — HEPARIN SODIUM 5000 UNITS: 5000 INJECTION INTRAVENOUS; SUBCUTANEOUS at 08:13

## 2021-11-07 RX ADMIN — HYDROCODONE BITARTRATE AND ACETAMINOPHEN 1 TABLET: 10; 325 TABLET ORAL at 10:01

## 2021-11-07 RX ADMIN — ROPINIROLE HYDROCHLORIDE 0.5 MG: 0.25 TABLET, FILM COATED ORAL at 21:14

## 2021-11-07 RX ADMIN — INSULIN ASPART 2 UNITS: 100 INJECTION, SOLUTION INTRAVENOUS; SUBCUTANEOUS at 08:13

## 2021-11-07 RX ADMIN — HEPARIN SODIUM 5000 UNITS: 5000 INJECTION INTRAVENOUS; SUBCUTANEOUS at 21:14

## 2021-11-07 RX ADMIN — PRAVASTATIN SODIUM 20 MG: 40 TABLET ORAL at 08:13

## 2021-11-07 RX ADMIN — CARVEDILOL 12.5 MG: 6.25 TABLET, FILM COATED ORAL at 16:54

## 2021-11-07 RX ADMIN — HYDRALAZINE HYDROCHLORIDE 25 MG: 25 TABLET, FILM COATED ORAL at 21:14

## 2021-11-07 RX ADMIN — HYDROCODONE BITARTRATE AND ACETAMINOPHEN 1 TABLET: 10; 325 TABLET ORAL at 21:19

## 2021-11-07 RX ADMIN — CEFDINIR 300 MG: 300 CAPSULE ORAL at 08:13

## 2021-11-07 RX ADMIN — HYDRALAZINE HYDROCHLORIDE 10 MG: 20 INJECTION INTRAMUSCULAR; INTRAVENOUS at 10:44

## 2021-11-07 RX ADMIN — TRAZODONE HYDROCHLORIDE 100 MG: 50 TABLET ORAL at 21:14

## 2021-11-07 RX ADMIN — ACETAMINOPHEN 650 MG: 325 TABLET ORAL at 06:48

## 2021-11-07 RX ADMIN — CARVEDILOL 12.5 MG: 6.25 TABLET, FILM COATED ORAL at 06:48

## 2021-11-07 RX ADMIN — CALCITRIOL 0.25 MCG: 0.25 CAPSULE ORAL at 08:13

## 2021-11-07 RX ADMIN — SODIUM CHLORIDE, PRESERVATIVE FREE 10 ML: 5 INJECTION INTRAVENOUS at 21:17

## 2021-11-07 RX ADMIN — ROPINIROLE HYDROCHLORIDE 0.5 MG: 0.25 TABLET, FILM COATED ORAL at 08:13

## 2021-11-07 RX ADMIN — CLOPIDOGREL 75 MG: 75 TABLET, FILM COATED ORAL at 08:13

## 2021-11-07 RX ADMIN — LEVOTHYROXINE SODIUM 25 MCG: 25 TABLET ORAL at 08:13

## 2021-11-07 RX ADMIN — HYDRALAZINE HYDROCHLORIDE 25 MG: 25 TABLET, FILM COATED ORAL at 16:55

## 2021-11-07 RX ADMIN — CALCIUM ACETATE 1334 MG: 667 CAPSULE ORAL at 08:12

## 2021-11-07 RX ADMIN — PANTOPRAZOLE SODIUM 40 MG: 40 TABLET, DELAYED RELEASE ORAL at 05:18

## 2021-11-07 RX ADMIN — INSULIN ASPART 3 UNITS: 100 INJECTION, SOLUTION INTRAVENOUS; SUBCUTANEOUS at 16:54

## 2021-11-07 RX ADMIN — ONDANSETRON 4 MG: 2 INJECTION INTRAMUSCULAR; INTRAVENOUS at 10:44

## 2021-11-07 RX ADMIN — CETIRIZINE HYDROCHLORIDE 5 MG: 10 TABLET, FILM COATED ORAL at 08:13

## 2021-11-07 RX ADMIN — HYDRALAZINE HYDROCHLORIDE 25 MG: 25 TABLET, FILM COATED ORAL at 08:57

## 2021-11-07 NOTE — PLAN OF CARE
Goal Outcome Evaluation:              Outcome Summary: Patient complained of pain PRN meds given, 2L NC in place, VSS, no other request at this time, Will continue to monitor.

## 2021-11-07 NOTE — PROGRESS NOTES
Nephrology Progress Note    Interval History:     Patient Complaints: No new complaints.  No nausea or vomiting or shortness of breath.  Creatinine has improved only marginally since yesterday.  Her blood pressure started getting high.  She denies any oliguria or dysuria.        Vital Signs  Temp:  [97.9 °F (36.6 °C)-99.6 °F (37.6 °C)] 99.5 °F (37.5 °C)  Heart Rate:  [74-99] 90  Resp:  [18-20] 18  BP: (120-222)/() 166/76    Physical Exam:    General:           No distress      HEENT:  Pallor               Neck:  No JVD       Lungs:    Bibasal crackles   Heart:   Regular,  no rub       Abdomen:   Normal bowel sounds, soft non-tender, non-distended, no guarding       Extremities:  Trace edema       Skin: No petechiae, no rash       Neurologic: Cranial nerves grossly intact, moves all extremities.        Results Review:    I reviewed the patient's new clinical results.    Lab Results (last 24 hours)     Procedure Component Value Units Date/Time    Protein / Creatinine Ratio, Urine - Urine, Clean Catch [[id number]]  (Abnormal) Collected: 11/06/21 1626    Specimen: Urine, Clean Catch Updated: 11/07/21 1647     Protein/Creatinine Ratio, Urine 8,196.7 mg/G Crea      Creatinine, Urine 24.4 mg/dL      Total Protein, Urine 200.0 mg/dL     POC Glucose Once [[id number]]  (Abnormal) Collected: 11/07/21 1625    Specimen: Blood Updated: 11/07/21 1639     Glucose 241 mg/dL      Comment: Meter: [id number] : [id number] yudelka keith       Creatinine, Urine, Random - Urine, Clean Catch [[id number]] Collected: 11/06/21 1626    Specimen: Urine, Clean Catch Updated: 11/07/21 1636     Creatinine, Urine 24.4 mg/dL     Narrative:      Reference intervals for random urine have not been established.  Clinical usage is dependent upon physician's interpretation in combination with other laboratory tests.       Hemoglobin & Hematocrit, Blood [[id number]]  (Abnormal) Collected:  11/07/21 1138    Specimen: Blood Updated: 11/07/21 1236     Hemoglobin 9.8 g/dL      Hematocrit 30.4 %     Narrative:      Post Transfusion Specimen      POC Glucose Once [[id number]]  (Abnormal) Collected: 11/07/21 1041    Specimen: Blood Updated: 11/07/21 1048     Glucose 154 mg/dL      Comment: Meter: [id number] : [id number] yudelka keith       POC Glucose Once [[id number]]  (Abnormal) Collected: 11/07/21 0642    Specimen: Blood Updated: 11/07/21 0647     Glucose 162 mg/dL      Comment: Meter: [id number] : [id number] IAM PEPPER       Magnesium [[id number]]  (Normal) Collected: 11/07/21 0421    Specimen: Blood Updated: 11/07/21 0557     Magnesium 2.1 mg/dL     Basic Metabolic Panel [[id number]]  (Abnormal) Collected: 11/07/21 0421    Specimen: Blood Updated: 11/07/21 0551     Glucose 157 mg/dL      BUN 54 mg/dL      Creatinine 3.45 mg/dL      Sodium 139 mmol/L      Potassium 4.6 mmol/L      Chloride 106 mmol/L      CO2 20.1 mmol/L      Calcium 7.0 mg/dL      eGFR   Amer --     Comment: <15 Indicative of kidney failure.        eGFR Non African Amer 13 mL/min/1.73      Comment: <15 Indicative of kidney failure.        BUN/Creatinine Ratio 15.7     Anion Gap 12.9 mmol/L     Narrative:      GFR Normal >60  Chronic Kidney Disease <60  Kidney Failure <15      Phosphorus [[id number]]  (Abnormal) Collected: 11/07/21 0421    Specimen: Blood Updated: 11/07/21 0551     Phosphorus 5.7 mg/dL     CBC (No Diff) [[id number]]  (Abnormal) Collected: 11/07/21 0421    Specimen: Blood Updated: 11/07/21 0538     WBC 6.00 10*3/mm3      RBC 2.28 10*6/mm3      Hemoglobin 6.9 g/dL      Hematocrit 22.0 %      MCV 96.5 fL      MCH 30.3 pg      MCHC 31.4 g/dL      RDW 14.4 %      RDW-SD 50.2 fl      MPV 11.3 fL      Platelets 84 10*3/mm3     Peripheral Blood Smear [[id number]] Collected: 11/07/21 0421    Specimen: Blood Updated: 11/07/21 0522    Blood Gas, Venous With Co-Ox [[id number]]  (Abnormal) Collected: 11/07/21 0425     Specimen: Venous Blood Updated: 11/07/21 0429     Site Lab     pH, Venous 7.323 pH Units      pCO2, Venous 50.3 mm Hg      pO2, Venous 66.6 mm Hg      Comment: 83 Value above reference range        HCO3, Venous 26.1 mmol/L      Base Excess, Venous -0.1 mmol/L      O2 Saturation, Venous 94.0 %      Comment: 83 Value above reference range        Hemoglobin, Blood Gas 7.3 g/dL      Comment: 84 Value below reference range        CO2 Content 27.6 mmol/L      Barometric Pressure for Blood Gas 731 mmHg      Modality Nasal Cannula     FIO2 24 %      Flow Rate 1.0 lpm      Ventilator Mode NA     Collected by [id number]     Comment: Meter: [id number]     :  [id number]        Oxyhemoglobin Venous 91.9 %      Comment: 83 Value above reference range        Carboxyhemoglobin Venous 1.7 %      Methemoglobin Venous 0.5 %     POC Glucose Once [[id number]]  (Abnormal) Collected: 11/06/21 2101    Specimen: Blood Updated: 11/06/21 2107     Glucose 256 mg/dL      Comment: Meter: [id number] : [id number] YOMAIRA GROSS             Imaging Results (Last 24 Hours)     ** No results found for the last 24 hours. **          Assessment and Plan:    1.  Acute renal failure on CKD stage IV  2.  Metabolic acidosis  3.  Respiratory acidosis  4.  Hyperkalemia  5.  Anemia of chronic disease  6.  Secondary hyperparathyroidism  7.  Type 2 diabetes with nephropathy not biopsy-proven poor control  8.  Hypertension  9.  Thrombocytopenia    I am worried that her kidney function is truly worse as she has not followed up in a while.  The thrombocytopenia was discussed with Dr. Sandoval.  The patient has really not been very sick so I doubt that we are dealing with HUS/TTP.  Her B12 and folate levels are borderline normal.  I will go ahead and order a haptoglobin, Sawyer TS 13, homocysteine, methylmalonic acid levels.  The increase in proteinuria is consistent with progression of diabetic nephropathy with poorly controlled blood pressure.  I am afraid  she is very close to dialysis.  She is asymptomatic at this time.  Probably need to watch for a day or 2 before we discharge her.  Her hyperkalemia is better.  She has been transfused for her low blood count.        Han Sandy MD  11/07/21  16:57 EST

## 2021-11-07 NOTE — PLAN OF CARE
Goal Outcome Evaluation:       Progress: no change  Outcome Summary: Pt been resting in bed today has ambulated to bathroom. Pt received 1 unit RBCs this AM, Hgb now stable at 9.8. Pt's BP has been elevated most of day, now much better with addition of new medications. Otherwise no new changes/issues.

## 2021-11-07 NOTE — PROGRESS NOTES
[facility] HOSPITALIST PROGRESS NOTE     Patient Identification:  Name:  Reny Elena  Age:  61 y.o.  Sex:  female  :  1960  MRN:  [id number]  Visit Number:  [id number]  ROOM: [address] [place] [address]     Primary Care Provider:  Susan Stephens APRN     Date of Admission: 2021    Length of stay in inpatient status:  2    Subjective     Chief Compliant:    Chief Complaint   Patient presents with   • Abnormal Lab     History of Presenting Illness:  61-year-old female that was admitted earlier today with acute kidney injury on top of her chronic kidney disease; the patient has been asymptomatic from this and was told to come to the hospital based on abnormal labs that her PCP obtained.  The patient was started on IV fluids and her creatinine did come down but she still had an elevated creatinine suggestive of chronic kidney disease stage IV versus V.  She also was noted to have a low hemoglobin level and is currently receiving her second unit of packed red blood cells.  The patient denies any chest pain, trouble breathing, coughing, nausea, vomiting, and diarrhea.  She also denies any leg edema.  She is now having some low platelet counts but there are no petechiae.    Objective     Current Hospital Meds:calcitriol, 0.25 mcg, Oral, Daily  calcium acetate, 1,334 mg, Oral, TID With Meals  carvedilol, 12.5 mg, Oral, BID With Meals  cefdinir, 300 mg, Oral, Daily  cetirizine, 5 mg, Oral, Daily  clopidogrel, 75 mg, Oral, Daily  gabapentin, 300 mg, Oral, Nightly  heparin (porcine), 5,000 Units, Subcutaneous, Q12H  hydrALAZINE, 25 mg, Oral, Q8H  insulin aspart, 0-7 Units, Subcutaneous, TID AC  isosorbide mononitrate, 30 mg, Oral, Q24H  levothyroxine, 25 mcg, Oral, Daily  pantoprazole, 40 mg, Oral, Q AM  pravastatin, 20 mg, Oral, Daily  rOPINIRole, 0.5 mg, Oral, BID  sodium chloride, 10 mL, Intravenous, Q12H  traZODone, 100 mg, Oral, Nightly    Pharmacy Consult,       Current Antimicrobial  Therapy:  Anti-Infectives (From admission, onward)    Ordered     Dose/Rate Route Frequency Start Stop    11/06/21 1226  cefdinir (OMNICEF) capsule 300 mg        Ordering Provider: Vinny Sandoval MD    300 mg Oral Daily 11/06/21 1300 11/13/21 0859        Current Diuretic Therapy:  Diuretics (From admission, onward)    None        ----------------------------------------------------------------------------------------------------------------------  Vital Signs:  Temp:  [97.9 °F (36.6 °C)-99.6 °F (37.6 °C)] 99.5 °F (37.5 °C)  Heart Rate:  [74-99] 90  Resp:  [18-20] 18  BP: (120-222)/() 166/76  SpO2:  [93 %-99 %] 98 %  on  Flow (L/min):  [1] 1;   Device (Oxygen Therapy): nasal cannula  Body mass index is 21.5 kg/m².    Wt Readings from Last 3 Encounters:   11/07/21 58.6 kg (129 lb 3.2 oz)   06/23/20 53.1 kg (117 lb)   01/23/20 54.9 kg (121 lb)     Intake & Output (last 3 days)       11/04 0701 11/05 0700 11/05 0701 11/06 0700 11/06 0701 11/07 0700 11/07 0701 11/08 0700    P.O.  1200 1320 480    I.V. (mL/kg)  1826.4 (31.4) 2032 (34.7) 583.7 (10)    Blood   361.3 376.3    Total Intake(mL/kg)  3026.4 (52.1) 3713.2 (63.4) 1440 (24.6)    Urine (mL/kg/hr)  700 (0.5)  200 (0.3)    Total Output  700  200    Net  +2326.4 +3713.2 +1240            Urine Unmeasured Occurrence  4 x 4 x 1 x        Diet Regular; Renal, Consistent Carbohydrate  ----------------------------------------------------------------------------------------------------------------------  Physical Exam; please note that her physical examination is same as yesterday.  Vitals reviewed.   Constitutional:       General: She is not in acute distress.     Appearance: Normal appearance. She is not ill-appearing, toxic-appearing or diaphoretic.   HENT:      Head: Normocephalic and atraumatic.      Right Ear: External ear normal.      Left Ear: External ear normal.      Nose: Nose normal.   Eyes:      General: No scleral icterus.     Pupils: Pupils are  equal, round, and reactive to light.   Cardiovascular:      Rate and Rhythm: Normal rate and regular rhythm.      Pulses: Normal pulses.      Heart sounds: No murmur heard.  Pulmonary:      Effort: Pulmonary effort is normal. No respiratory distress.      Breath sounds: No wheezing or rales.   Skin:     Coloration: Skin is not pale.      Findings: No bruising.   Neurological:      Mental Status: She is alert and oriented to person, place, and time. Mental status is at baseline.      Cranial Nerves: No cranial nerve deficit.   Psychiatric:         Mood and Affect: Mood normal.         Behavior: Behavior normal. Behavior is cooperative.   ----------------------------------------------------------------------------------------------------------------------  Tele: Normal sinus rhythm heart rate 70s to 90s.  I personally reviewed the telemetry strips.  ----------------------------------------------------------------------------------------------------------------------  LABS:          CBC and coagulation:  Results from last 7 days   Lab Units 11/07/21  1138 11/07/21  0421 11/06/21  1511 11/06/21  0553 11/05/21  0719 11/04/21  1523   WBC 10*3/mm3  --  6.00  --  6.43 5.31 4.45   HEMOGLOBIN g/dL 9.8* 6.9* 8.1* 6.4* 7.7* 8.0*   HEMATOCRIT % 30.4* 22.0* 27.9* 20.7* 25.6* 25.9*   MCV fL  --  96.5  --  98.6* 100.8* 99.2*   MCHC g/dL  --  31.4*  --  30.9* 30.1* 30.9*   PLATELETS 10*3/mm3  --  84*  --  88* 110* 114*     Acid/base balance:  Results from last 7 days   Lab Units 11/04/21  2217   PH, ARTERIAL pH units 7.221*   PO2 ART mm Hg 61.7*   PCO2, ARTERIAL mm Hg 48.2*   HCO3 ART mmol/L 19.8*     Renal and electrolytes:  Results from last 7 days   Lab Units 11/07/21  0421 11/06/21  0437 11/05/21  0719 11/05/21  0318 11/04/21  2220 11/04/21  1523 11/04/21  1233   SODIUM mmol/L 139 138 145 143 146* 141 140   POTASSIUM mmol/L 4.6 4.6 4.7 4.9 5.1 6.0* 6.0*   MAGNESIUM mg/dL 2.1 1.6  --   --   --  2.0  --    CHLORIDE mmol/L 106 107  114* 111* 112* 107 111*   CO2 mmol/L 20.1* 17.5* 17.8* 18.1* 19.9* 17.6* 16.0*   BUN mg/dL 54* 55* 62* 65* 64* 65* 64*   CREATININE mg/dL 3.45* 3.54* 3.95* 4.26* 4.56* 4.57* 4.29*   EGFR IF NONAFRICN AM mL/min/1.73 13* 13* 11* 11* 10* 10* 10*   CALCIUM mg/dL 7.0* 6.4* 7.3* 7.2* 7.6* 7.6* 7.5*   IONIZED CALCIUM mmol/L  --  0.90*  --   --   --   --   --    PHOSPHORUS mg/dL 5.7* 6.2*  --   --   --   --   --    GLUCOSE mg/dL 157* 87 62* 112* 53* 129* 123*     Estimated Creatinine Clearance: 15.4 mL/min (A) (by C-G formula based on SCr of 3.45 mg/dL (H)).    Liver and pancreatic function:  Results from last 7 days   Lab Units 11/05/21  0719 11/04/21  1523 11/04/21  1233   ALBUMIN g/dL 3.46* 4.02 3.90   BILIRUBIN mg/dL <0.2 0.2 <0.2   ALK PHOS U/L 89 101 94   AST (SGOT) U/L 13 14 12   ALT (SGPT) U/L 10 10 10     Endocrine function:  Lab Results   Component Value Date    HGBA1C 6.40 (H) 11/04/2021     Point of care bedside glucose levels:  Results from last 7 days   Lab Units 11/07/21  1625 11/07/21  1041 11/07/21  0642 11/06/21  2101 11/06/21  1625 11/06/21  1008 11/06/21  0535 11/05/21  2128 11/05/21  1707 11/05/21  1058 11/05/21  0852 11/05/21  0745 11/04/21  1726   GLUCOSE mg/dL 241* 154* 162* 256* 175* 180* 93 198* 197* 248* 145* 59* 152*     Glucose levels from the [facility]:  Results from last 7 days   Lab Units 11/07/21  0421 11/06/21  0437 11/05/21  0719 11/05/21  0318 11/04/21  2220 11/04/21  1523 11/04/21  1233   GLUCOSE mg/dL 157* 87 62* 112* 53* 129* 123*     Lab Results   Component Value Date    TSH 4.940 (H) 11/05/2021    FREET4 0.87 (L) 11/05/2021     Cardiac:  Results from last 7 days   Lab Units 11/05/21  0318 11/04/21  2220 11/04/21  1523   TROPONIN T ng/mL 0.025 0.027 0.034*       Cultures:  Lab Results   Component Value Date    COLORU Yellow 11/04/2021    CLARITYU Clear 11/04/2021    PHUR <=5.0 11/04/2021    PROTEINUR 200.0 11/06/2021    GLUCOSEU Negative 11/04/2021    KETONESU Negative 11/04/2021    BLOODU  Trace (A) 11/04/2021    NITRITEU Negative 11/04/2021    LEUKOCYTESUR Trace (A) 11/04/2021    BILIRUBINUR Negative 11/04/2021    UROBILINOGEN 0.2 E.U./dL 11/04/2021    RBCUA 0-2 11/04/2021    WBCUA 6-12 (A) 11/04/2021    BACTERIA 1+ (A) 11/04/2021     Microbiology Results (last 10 days)     Procedure Component Value - Date/Time    COVID-19 and FLU A/B PCR - Swab, Nasopharynx [[id number]]  (Normal) Collected: 11/04/21 1915    Lab Status: Final result Specimen: Swab from Nasopharynx Updated: 11/04/21 2009     COVID19 Not Detected     Influenza A PCR Not Detected     Influenza B PCR Not Detected    Narrative:      Fact sheet for providers: https://www.fda.gov/media/189949/download    Fact sheet for patients: https://www.fda.gov/media/825634/download    Test performed by PCR.    Urine Culture - Urine, Urine, Clean Catch [[id number]]  (Abnormal)  (Susceptibility) Collected: 11/04/21 1806    Lab Status: Final result Specimen: Urine, Clean Catch Updated: 11/06/21 1017     Urine Culture >100,000 CFU/mL Escherichia coli    Susceptibility      Escherichia coli     DAVID     Ampicillin Resistant     Ampicillin + Sulbactam Intermediate     Cefazolin Susceptible     Cefepime Susceptible     Ceftazidime Susceptible     Ceftriaxone Susceptible     Gentamicin Susceptible     Levofloxacin Intermediate     Nitrofurantoin Susceptible     Piperacillin + Tazobactam Susceptible     Tetracycline Susceptible     Trimethoprim + Sulfamethoxazole Resistant                        I have personally looked at the labs and they are summarized above.    Assessment & Plan      -Acute on chronic stage IV CKD (presumed diabetic nephropathy for nephrotic range proteinuria with negative serologies for lupus and vasculitis and myeloma and hypocomplementemia), baseline creatinine previously 1.0-1.2 in 2019, 1.7-2 in 2020, and the only lab from 2021 available to review is from 4/2021 with creatinine of 2.47  -E. 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 Sandy today; he thinks that the increase in proteinuria is due to progression of diabetic nephropathy and poorly controlled blood pressure.  He would like to monitor her labs for 1-2 more days as she is close to being on dialysis.  He is also ordered some morning labs to further delineate her worsening renal function.  After her second unit of blood, her hemoglobin has improved; there is still no obvious bleeding anywhere.  I had stopped her clonidine because she had low blood pressures at the beginning of her hospital stay; her blood pressures then increase to systolics over 200.  Therefore, I have added Imdur and hydralazine as she is unable to take an ACE inhibitor or an ARB due to her kidney function.  So far, her systolics have went down to 140-160s/60-70's.  We will repeat her blood work in the morning and we will continue to monitor her blood pressures closely.  For the thrombocytopenia, I had ordered a peripheral smear but it is still pending.  After I increased her glucose yesterday by adding mealtime NovoLog and increasing her sliding scale, her glucose levels have improved.  We will continue with this current regimen of insulin and continue monitoring her blood glucose levels closely.  She needs to continue with the oral Omnicef for the E. coli UTI.    VTE Prophylaxis:   Mechanical Order History:     None      Pharmalogical Order History:      Ordered     Dose Route Frequency Stop    11/05/21 0643  heparin  (porcine) 5000 UNIT/ML injection 5,000 Units         5,000 Units SC Every 12 Hours Scheduled --              Disposition: Anticipate discharge home in 1 to 2 days per nephrology's request.    Vinny Sandoval MD  [facility]  11/07/21  18:24 EST    Peripheral smear is pending from today.

## 2021-11-08 LAB
ANION GAP SERPL CALCULATED.3IONS-SCNC: 10.9 MMOL/L (ref 5–15)
BH BB BLOOD EXPIRATION DATE: NORMAL
BH BB BLOOD TYPE BARCODE: 5100
BH BB DISPENSE STATUS: NORMAL
BH BB PRODUCT CODE: NORMAL
BH BB UNIT NUMBER: NORMAL
BUN SERPL-MCNC: 56 MG/DL (ref 8–23)
BUN/CREAT SERPL: 14.9 (ref 7–25)
CALCIUM SPEC-SCNC: 7.8 MG/DL (ref 8.6–10.5)
CHLORIDE SERPL-SCNC: 101 MMOL/L (ref 98–107)
CO2 SERPL-SCNC: 22.1 MMOL/L (ref 22–29)
CREAT SERPL-MCNC: 3.75 MG/DL (ref 0.57–1)
CROSSMATCH INTERPRETATION: NORMAL
GFR SERPL CREATININE-BSD FRML MDRD: 12 ML/MIN/1.73
GFR SERPL CREATININE-BSD FRML MDRD: ABNORMAL ML/MIN/{1.73_M2}
GLUCOSE BLDC GLUCOMTR-MCNC: 140 MG/DL (ref 70–130)
GLUCOSE BLDC GLUCOMTR-MCNC: 167 MG/DL (ref 70–130)
GLUCOSE BLDC GLUCOMTR-MCNC: 257 MG/DL (ref 70–130)
GLUCOSE BLDC GLUCOMTR-MCNC: 95 MG/DL (ref 70–130)
GLUCOSE SERPL-MCNC: 186 MG/DL (ref 65–99)
HAPTOGLOB SERPL-MCNC: 179 MG/DL (ref 30–200)
HCT VFR BLD AUTO: 27.5 % (ref 34–46.6)
HCYS SERPL-MCNC: 16 UMOL/L (ref 0–15)
HGB BLD-MCNC: 8.7 G/DL (ref 12–15.9)
MAGNESIUM SERPL-MCNC: 2 MG/DL (ref 1.6–2.4)
PHOSPHATE SERPL-MCNC: 5.3 MG/DL (ref 2.5–4.5)
POTASSIUM SERPL-SCNC: 5 MMOL/L (ref 3.5–5.2)
QT INTERVAL: 410 MS
QTC INTERVAL: 472 MS
SODIUM SERPL-SCNC: 134 MMOL/L (ref 136–145)
UNIT  ABO: NORMAL
UNIT  RH: NORMAL

## 2021-11-08 PROCEDURE — P9047 ALBUMIN (HUMAN), 25%, 50ML: HCPCS | Performed by: INTERNAL MEDICINE

## 2021-11-08 PROCEDURE — 63710000001 INSULIN ASPART PER 5 UNITS: Performed by: PHYSICIAN ASSISTANT

## 2021-11-08 PROCEDURE — 80048 BASIC METABOLIC PNL TOTAL CA: CPT | Performed by: INTERNAL MEDICINE

## 2021-11-08 PROCEDURE — 25010000002 ALBUMIN HUMAN 25% PER 50 ML: Performed by: INTERNAL MEDICINE

## 2021-11-08 PROCEDURE — 25010000002 HYDRALAZINE PER 20 MG: Performed by: INTERNAL MEDICINE

## 2021-11-08 PROCEDURE — 83921 ORGANIC ACID SINGLE QUANT: CPT | Performed by: INTERNAL MEDICINE

## 2021-11-08 PROCEDURE — 84100 ASSAY OF PHOSPHORUS: CPT | Performed by: INTERNAL MEDICINE

## 2021-11-08 PROCEDURE — 85014 HEMATOCRIT: CPT | Performed by: INTERNAL MEDICINE

## 2021-11-08 PROCEDURE — 83090 ASSAY OF HOMOCYSTEINE: CPT | Performed by: INTERNAL MEDICINE

## 2021-11-08 PROCEDURE — 83010 ASSAY OF HAPTOGLOBIN QUANT: CPT | Performed by: INTERNAL MEDICINE

## 2021-11-08 PROCEDURE — 85397 CLOTTING FUNCT ACTIVITY: CPT | Performed by: INTERNAL MEDICINE

## 2021-11-08 PROCEDURE — 94799 UNLISTED PULMONARY SVC/PX: CPT

## 2021-11-08 PROCEDURE — 83735 ASSAY OF MAGNESIUM: CPT | Performed by: INTERNAL MEDICINE

## 2021-11-08 PROCEDURE — 99233 SBSQ HOSP IP/OBS HIGH 50: CPT | Performed by: INTERNAL MEDICINE

## 2021-11-08 PROCEDURE — 63710000001 INSULIN ASPART PER 5 UNITS: Performed by: INTERNAL MEDICINE

## 2021-11-08 PROCEDURE — 63710000001 INSULIN DETEMIR PER 5 UNITS: Performed by: INTERNAL MEDICINE

## 2021-11-08 PROCEDURE — 82962 GLUCOSE BLOOD TEST: CPT

## 2021-11-08 PROCEDURE — 85018 HEMOGLOBIN: CPT | Performed by: INTERNAL MEDICINE

## 2021-11-08 PROCEDURE — 25010000002 HEPARIN (PORCINE) PER 1000 UNITS: Performed by: INTERNAL MEDICINE

## 2021-11-08 RX ORDER — HYDRALAZINE HYDROCHLORIDE 50 MG/1
50 TABLET, FILM COATED ORAL EVERY 12 HOURS SCHEDULED
Status: DISCONTINUED | OUTPATIENT
Start: 2021-11-08 | End: 2021-11-09 | Stop reason: HOSPADM

## 2021-11-08 RX ORDER — ALBUMIN (HUMAN) 12.5 G/50ML
12.5 SOLUTION INTRAVENOUS 2 TIMES DAILY
Status: COMPLETED | OUTPATIENT
Start: 2021-11-08 | End: 2021-11-09

## 2021-11-08 RX ORDER — HYDROXYZINE HYDROCHLORIDE 25 MG/1
25 TABLET, FILM COATED ORAL 3 TIMES DAILY PRN
Status: DISCONTINUED | OUTPATIENT
Start: 2021-11-08 | End: 2021-11-09 | Stop reason: HOSPADM

## 2021-11-08 RX ADMIN — HYDROXYZINE HYDROCHLORIDE 25 MG: 25 TABLET ORAL at 21:05

## 2021-11-08 RX ADMIN — INSULIN ASPART 4 UNITS: 100 INJECTION, SOLUTION INTRAVENOUS; SUBCUTANEOUS at 11:14

## 2021-11-08 RX ADMIN — CEFDINIR 300 MG: 300 CAPSULE ORAL at 08:04

## 2021-11-08 RX ADMIN — FLUTICASONE PROPIONATE 2 SPRAY: 50 SPRAY, METERED NASAL at 12:07

## 2021-11-08 RX ADMIN — CALCITRIOL 0.25 MCG: 0.25 CAPSULE ORAL at 08:05

## 2021-11-08 RX ADMIN — CALCIUM ACETATE 1334 MG: 667 CAPSULE ORAL at 11:14

## 2021-11-08 RX ADMIN — ROPINIROLE HYDROCHLORIDE 0.5 MG: 0.25 TABLET, FILM COATED ORAL at 08:03

## 2021-11-08 RX ADMIN — SODIUM CHLORIDE, PRESERVATIVE FREE 10 ML: 5 INJECTION INTRAVENOUS at 08:07

## 2021-11-08 RX ADMIN — INSULIN ASPART 3 UNITS: 100 INJECTION, SOLUTION INTRAVENOUS; SUBCUTANEOUS at 13:27

## 2021-11-08 RX ADMIN — SODIUM CHLORIDE, PRESERVATIVE FREE 10 ML: 5 INJECTION INTRAVENOUS at 21:02

## 2021-11-08 RX ADMIN — CALCIUM ACETATE 1334 MG: 667 CAPSULE ORAL at 17:04

## 2021-11-08 RX ADMIN — CLOPIDOGREL 75 MG: 75 TABLET, FILM COATED ORAL at 08:04

## 2021-11-08 RX ADMIN — INSULIN ASPART 2 UNITS: 100 INJECTION, SOLUTION INTRAVENOUS; SUBCUTANEOUS at 08:01

## 2021-11-08 RX ADMIN — HYDRALAZINE HYDROCHLORIDE 10 MG: 20 INJECTION INTRAMUSCULAR; INTRAVENOUS at 14:37

## 2021-11-08 RX ADMIN — HEPARIN SODIUM 5000 UNITS: 5000 INJECTION INTRAVENOUS; SUBCUTANEOUS at 08:07

## 2021-11-08 RX ADMIN — CARVEDILOL 12.5 MG: 6.25 TABLET, FILM COATED ORAL at 08:00

## 2021-11-08 RX ADMIN — HYDRALAZINE HYDROCHLORIDE 25 MG: 25 TABLET, FILM COATED ORAL at 05:55

## 2021-11-08 RX ADMIN — TRAZODONE HYDROCHLORIDE 100 MG: 50 TABLET ORAL at 21:05

## 2021-11-08 RX ADMIN — INSULIN DETEMIR 8 UNITS: 100 INJECTION, SOLUTION SUBCUTANEOUS at 15:54

## 2021-11-08 RX ADMIN — ROPINIROLE HYDROCHLORIDE 0.5 MG: 0.25 TABLET, FILM COATED ORAL at 21:05

## 2021-11-08 RX ADMIN — HYDROCODONE BITARTRATE AND ACETAMINOPHEN 1 TABLET: 10; 325 TABLET ORAL at 05:58

## 2021-11-08 RX ADMIN — ALBUMIN HUMAN 12.5 G: 0.25 SOLUTION INTRAVENOUS at 21:01

## 2021-11-08 RX ADMIN — PRAVASTATIN SODIUM 20 MG: 40 TABLET ORAL at 08:04

## 2021-11-08 RX ADMIN — ISOSORBIDE MONONITRATE 30 MG: 30 TABLET, EXTENDED RELEASE ORAL at 08:05

## 2021-11-08 RX ADMIN — HEPARIN SODIUM 5000 UNITS: 5000 INJECTION INTRAVENOUS; SUBCUTANEOUS at 21:04

## 2021-11-08 RX ADMIN — HYDROCODONE BITARTRATE AND ACETAMINOPHEN 1 TABLET: 10; 325 TABLET ORAL at 19:44

## 2021-11-08 RX ADMIN — LEVOTHYROXINE SODIUM 25 MCG: 25 TABLET ORAL at 08:05

## 2021-11-08 RX ADMIN — PANTOPRAZOLE SODIUM 40 MG: 40 TABLET, DELAYED RELEASE ORAL at 05:55

## 2021-11-08 RX ADMIN — CARVEDILOL 12.5 MG: 6.25 TABLET, FILM COATED ORAL at 17:04

## 2021-11-08 RX ADMIN — CETIRIZINE HYDROCHLORIDE 5 MG: 10 TABLET, FILM COATED ORAL at 08:05

## 2021-11-08 RX ADMIN — CALCIUM ACETATE 1334 MG: 667 CAPSULE ORAL at 08:00

## 2021-11-08 RX ADMIN — HYDRALAZINE HYDROCHLORIDE 50 MG: 50 TABLET, FILM COATED ORAL at 21:05

## 2021-11-08 NOTE — CASE MANAGEMENT/SOCIAL WORK
Discharge Planning Assessment   Jose Alfredo     Patient Name: Reny Elena  MRN: [id number]  Today's Date: 11/8/2021    Admit Date: 11/4/2021     Discharge Plan     Row Name 11/08/21 1552       Plan    Plan SS spoke to pt and she plans to return home with friend, Cliff Leiva at discharge. Pt does not utilize home health services. Pt has a bath bench, cane, bedside commode, glucometer, shower chair, and rolling walker. SS to follow.              DELROY Reaves

## 2021-11-08 NOTE — PLAN OF CARE
Goal Outcome Evaluation:           Progress: no change  Outcome Summary: Pt resting in bed at this time. Complaints of pain, PRN pain medications given. No acute changes at this time. Will continue to monitor.

## 2021-11-08 NOTE — PROGRESS NOTES
[facility] HOSPITALIST PROGRESS NOTE     Patient Identification:  Name:  Reny Elena  Age:  61 y.o.  Sex:  female  :  1960  MRN:  [id number]  Visit Number:  [id number]  ROOM: [address] [place] [address]     Primary Care Provider:  Susan Stephens APRN     Date of Admission: 2021    Length of stay in inpatient status:  3    Subjective     Chief Compliant:    Chief Complaint   Patient presents with   • Abnormal Lab     History of Presenting Illness:  61-year-old female that was admitted earlier today with acute kidney injury on top of her chronic kidney disease; the patient has been asymptomatic from this and was told to come to the hospital based on abnormal labs that her PCP obtained.  The patient was started on IV fluids and her creatinine did come down but she still had an elevated creatinine suggestive of chronic kidney disease stage IV versus V.  She also was noted to have a low hemoglobin level and is currently receiving her second unit of packed red blood cells.  The patient denies any chest pain, trouble breathing, coughing, nausea, vomiting, and diarrhea.  She also denies any leg edema.  She is now having some low platelet counts but there are no petechiae.     -pt seen and examined, no new cardiopulmonary distress, no fever or chills reported.  Per pt sh has Bs control anywhere from 40's-300's at home      Objective     Current Hospital Meds:albumin human, 12.5 g, Intravenous, BID  calcitriol, 0.25 mcg, Oral, Daily  calcium acetate, 1,334 mg, Oral, TID With Meals  carvedilol, 12.5 mg, Oral, BID With Meals  cefdinir, 300 mg, Oral, Daily  cetirizine, 5 mg, Oral, Daily  clopidogrel, 75 mg, Oral, Daily  gabapentin, 300 mg, Oral, Nightly  heparin (porcine), 5,000 Units, Subcutaneous, Q12H  hydrALAZINE, 50 mg, Oral, Q12H  insulin aspart, 0-7 Units, Subcutaneous, TID AC  insulin aspart, 3 Units, Subcutaneous, TID With Meals  insulin detemir, 8 Units, Subcutaneous, Daily  isosorbide mononitrate,  30 mg, Oral, Q24H  levothyroxine, 25 mcg, Oral, Daily  pantoprazole, 40 mg, Oral, Q AM  pravastatin, 20 mg, Oral, Daily  rOPINIRole, 0.5 mg, Oral, BID  sodium chloride, 10 mL, Intravenous, Q12H  traZODone, 100 mg, Oral, Nightly    Pharmacy Consult,       Current Antimicrobial Therapy:  Anti-Infectives (From admission, onward)    Ordered     Dose/Rate Route Frequency Start Stop    11/06/21 1226  cefdinir (OMNICEF) capsule 300 mg        Ordering Provider: Vinny Sandoval MD    300 mg Oral Daily 11/06/21 1300 11/13/21 0859        Current Diuretic Therapy:  Diuretics (From admission, onward)    None        ----------------------------------------------------------------------------------------------------------------------  Vital Signs:  Temp:  [98.5 °F (36.9 °C)-99.5 °F (37.5 °C)] 98.8 °F (37.1 °C)  Heart Rate:  [76-90] 76  Resp:  [18-20] 18  BP: (127-210)/(60-94) 130/61  SpO2:  [93 %-98 %] 96 %  on  Flow (L/min):  [1-2] 1;   Device (Oxygen Therapy): nasal cannula  Body mass index is 22.9 kg/m².    Wt Readings from Last 3 Encounters:   11/08/21 62.4 kg (137 lb 9.6 oz)   06/23/20 53.1 kg (117 lb)   01/23/20 54.9 kg (121 lb)     Intake & Output (last 3 days)       11/05 0701 11/06 0700 11/06 0701 11/07 0700 11/07 0701 11/08 0700 11/08 0701 11/09 0700    P.O. 1200 1320 660 240    I.V. (mL/kg) 1826.4 (31.4) 2032 (34.7) 583.7 (9.4)     Blood  361.3 376.3     Total Intake(mL/kg) 3026.4 (52.1) 3713.2 (63.4) 1620 (26) 240 (3.8)    Urine (mL/kg/hr) 700 (0.5)  675 (0.5)     Stool   0     Total Output 700  675     Net +2326.4 +3713.2 +945 +240            Urine Unmeasured Occurrence 4 x 4 x 1 x     Stool Unmeasured Occurrence   0 x         Diet Regular; Renal, Consistent Carbohydrate  ----------------------------------------------------------------------------------------------------------------------  Physical Exam; please note that her physical examination is same as yesterday.  Vitals reviewed.   Constitutional:        General: She is not in acute distress.     Appearance: Normal appearance. She is not ill-appearing, toxic-appearing or diaphoretic.   HENT:      Head: Normocephalic and atraumatic.      Right Ear: External ear normal.      Left Ear: External ear normal.      Nose: Nose normal.   Eyes:      General: No scleral icterus.     Pupils: Pupils are equal, round, and reactive to light.   Cardiovascular:      Rate and Rhythm: Normal rate and regular rhythm.      Pulses: Normal pulses.      Heart sounds: No murmur heard.  Pulmonary:      Effort: Pulmonary effort is normal. No respiratory distress.      Breath sounds: No wheezing or rales.   Skin:     Coloration: Skin is not pale.      Findings: No bruising.   Neurological:      Mental Status: She is alert and oriented to person, place, and time. Mental status is at baseline.      Cranial Nerves: No cranial nerve deficit.   Psychiatric:         Mood and Affect: Mood normal.         Behavior: Behavior normal. Behavior is cooperative.   ----------------------------------------------------------------------------------------------------------------------  \  ----------------------------------------------------------------------------------------------------------------------  LABS:          CBC and coagulation:  Results from last 7 days   Lab Units 11/08/21  0131 11/07/21  1138 11/07/21  0421 11/06/21  1511 11/06/21  0553 11/05/21  0719 11/04/21  1523   WBC 10*3/mm3  --   --  6.00  --  6.43 5.31 4.45   HEMOGLOBIN g/dL 8.7* 9.8* 6.9* 8.1* 6.4* 7.7* 8.0*   HEMATOCRIT % 27.5* 30.4* 22.0* 27.9* 20.7* 25.6* 25.9*   MCV fL  --   --  96.5  --  98.6* 100.8* 99.2*   MCHC g/dL  --   --  31.4*  --  30.9* 30.1* 30.9*   PLATELETS 10*3/mm3  --   --  84*  --  88* 110* 114*     Acid/base balance:  Results from last 7 days   Lab Units 11/04/21  2217   PH, ARTERIAL pH units 7.221*   PO2 ART mm Hg 61.7*   PCO2, ARTERIAL mm Hg 48.2*   HCO3 ART mmol/L 19.8*     Renal and electrolytes:  Results from  last 7 days   Lab Units 11/08/21  0131 11/07/21  0421 11/06/21  0437 11/05/21  0719 11/05/21  0318 11/04/21  2220 11/04/21  1523   SODIUM mmol/L 134* 139 138 145 143 146* 141   POTASSIUM mmol/L 5.0 4.6 4.6 4.7 4.9 5.1 6.0*   MAGNESIUM mg/dL 2.0 2.1 1.6  --   --   --  2.0   CHLORIDE mmol/L 101 106 107 114* 111* 112* 107   CO2 mmol/L 22.1 20.1* 17.5* 17.8* 18.1* 19.9* 17.6*   BUN mg/dL 56* 54* 55* 62* 65* 64* 65*   CREATININE mg/dL 3.75* 3.45* 3.54* 3.95* 4.26* 4.56* 4.57*   EGFR IF NONAFRICN AM mL/min/1.73 12* 13* 13* 11* 11* 10* 10*   CALCIUM mg/dL 7.8* 7.0* 6.4* 7.3* 7.2* 7.6* 7.6*   IONIZED CALCIUM mmol/L  --   --  0.90*  --   --   --   --    PHOSPHORUS mg/dL 5.3* 5.7* 6.2*  --   --   --   --    GLUCOSE mg/dL 186* 157* 87 62* 112* 53* 129*     Estimated Creatinine Clearance: 15.1 mL/min (A) (by C-G formula based on SCr of 3.75 mg/dL (H)).    Liver and pancreatic function:  Results from last 7 days   Lab Units 11/05/21  0719 11/04/21  1523 11/04/21  1233   ALBUMIN g/dL 3.46* 4.02 3.90   BILIRUBIN mg/dL <0.2 0.2 <0.2   ALK PHOS U/L 89 101 94   AST (SGOT) U/L 13 14 12   ALT (SGPT) U/L 10 10 10     Endocrine function:  Lab Results   Component Value Date    HGBA1C 6.40 (H) 11/04/2021     Point of care bedside glucose levels:  Results from last 7 days   Lab Units 11/08/21  1041 11/08/21  0628 11/07/21  2113 11/07/21  1625 11/07/21  1041 11/07/21  0642 11/06/21  2101 11/06/21  1625 11/06/21  1008 11/06/21  0535 11/05/21  2128 11/05/21  1707 11/05/21  1058 11/05/21  0852   GLUCOSE mg/dL 257* 167* 219* 241* 154* 162* 256* 175* 180* 93 198* 197* 248* 145*     Glucose levels from the CMP:  Results from last 7 days   Lab Units 11/08/21  0131 11/07/21  0421 11/06/21  0437 11/05/21  0719 11/05/21  0318 11/04/21  2220 11/04/21  1523 11/04/21  1233   GLUCOSE mg/dL 186* 157* 87 62* 112* 53* 129* 123*     Lab Results   Component Value Date    TSH 4.940 (H) 11/05/2021    FREET4 0.87 (L) 11/05/2021     Cardiac:  Results from last 7  days   Lab Units 11/05/21  0318 11/04/21  2220 11/04/21  1523   TROPONIN T ng/mL 0.025 0.027 0.034*       Cultures:  Lab Results   Component Value Date    COLORU Yellow 11/04/2021    CLARITYU Clear 11/04/2021    PHUR <=5.0 11/04/2021    PROTEINUR 200.0 11/06/2021    GLUCOSEU Negative 11/04/2021    KETONESU Negative 11/04/2021    BLOODU Trace (A) 11/04/2021    NITRITEU Negative 11/04/2021    LEUKOCYTESUR Trace (A) 11/04/2021    BILIRUBINUR Negative 11/04/2021    UROBILINOGEN 0.2 E.U./dL 11/04/2021    RBCUA 0-2 11/04/2021    WBCUA 6-12 (A) 11/04/2021    BACTERIA 1+ (A) 11/04/2021     Microbiology Results (last 10 days)     Procedure Component Value - Date/Time    COVID-19 and FLU A/B PCR - Swab, Nasopharynx [[id number]]  (Normal) Collected: 11/04/21 1915    Lab Status: Final result Specimen: Swab from Nasopharynx Updated: 11/04/21 2009     COVID19 Not Detected     Influenza A PCR Not Detected     Influenza B PCR Not Detected    Narrative:      Fact sheet for providers: https://www.fda.gov/media/749809/download    Fact sheet for patients: https://www.fda.gov/media/583732/download    Test performed by PCR.    Urine Culture - Urine, Urine, Clean Catch [[id number]]  (Abnormal)  (Susceptibility) Collected: 11/04/21 1806    Lab Status: Final result Specimen: Urine, Clean Catch Updated: 11/06/21 1017     Urine Culture >100,000 CFU/mL Escherichia coli    Susceptibility      Escherichia coli     DAVID     Ampicillin Resistant     Ampicillin + Sulbactam Intermediate     Cefazolin Susceptible     Cefepime Susceptible     Ceftazidime Susceptible     Ceftriaxone Susceptible     Gentamicin Susceptible     Levofloxacin Intermediate     Nitrofurantoin Susceptible     Piperacillin + Tazobactam Susceptible     Tetracycline Susceptible     Trimethoprim + Sulfamethoxazole Resistant                        I have personally looked at the labs and they are summarized above.        Assessment & Plan      -Progressive CKD, approaching CKD-V  (presumed diabetic nephropathy for nephrotic range proteinuria)  -E. 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  Needs to be on lower long acting, start levemir 8 units, add aspart 3 units preprandial.  Cont ISS  --pt is getting albumin, will check BMP in am  --adjust hydralazine to 50 BID (for compliance purpose)   --cont other antihypertensives       Nelida Mcintyre MD  11/08/21  13:14 EST

## 2021-11-08 NOTE — PROGRESS NOTES
Nephrology Progress Note      Subjective     Patient denied any chest pain or shortness of breath    Objective       Vital signs :     Temp:  [98.5 °F (36.9 °C)-99.6 °F (37.6 °C)] 98.7 °F (37.1 °C)  Heart Rate:  [74-99] 84  Resp:  [18-20] 20  BP: (127-210)/(60-94) 210/94      Intake/Output Summary (Last 24 hours) at 11/8/2021 0755  Last data filed at 11/8/2021 0536  Gross per 24 hour   Intake 1620 ml   Output 675 ml   Net 945 ml       Physical Exam:    General Appearance : not in acute distress  Lungs : clear to auscultation, respirations regular  Heart :  regular rhythm & normal rate, normal S1, S2 and no murmur, no rub  Abdomen : normal bowel sounds, no masses, no hepatomegaly, no splenomegaly, soft non-tender and no guarding  Extremities : no edema, no cyanosis and no redness  Neurologic :   orientated to person, place, time and situation, Grossly no focal deficits      Laboratory Data :     Albumin No results found for: ALBUMIN   Magnesium Magnesium   Date Value Ref Range Status   11/08/2021 2.0 1.6 - 2.4 mg/dL Final   11/07/2021 2.1 1.6 - 2.4 mg/dL Final   11/06/2021 1.6 1.6 - 2.4 mg/dL Final          PTH                 PTH, Intact   Date Value Ref Range Status   11/06/2021 369.3 (H) 15.0 - 65.0 pg/mL Final       CBC and coagulation:  Results from last 7 days   Lab Units 11/08/21  0131 11/07/21  1138 11/07/21  0421 11/06/21  1511 11/06/21  0553 11/05/21  0719 11/05/21  0719   WBC 10*3/mm3  --   --  6.00  --  6.43  --  5.31   HEMOGLOBIN g/dL 8.7* 9.8* 6.9*   < > 6.4*   < > 7.7*   HEMATOCRIT % 27.5* 30.4* 22.0*   < > 20.7*   < > 25.6*   MCV fL  --   --  96.5  --  98.6*  --  100.8*   MCHC g/dL  --   --  31.4*  --  30.9*  --  30.1*   PLATELETS 10*3/mm3  --   --  84*  --  88*  --  110*    < > = values in this interval not displayed.     Acid/base balance:  Results from last 7 days   Lab Units 11/04/21  2217   PH, ARTERIAL pH units 7.221*   PO2 ART mm Hg 61.7*   PCO2, ARTERIAL mm Hg 48.2*   HCO3 ART mmol/L 19.8*      Renal and electrolytes:  Results from last 7 days   Lab Units 11/08/21  0131 11/07/21  0421 11/06/21  0437 11/05/21  0719 11/05/21  0719 11/05/21 0318 11/05/21  0318   SODIUM mmol/L 134* 139 138  --  145  --  143   POTASSIUM mmol/L 5.0 4.6 4.6   < > 4.7   < > 4.9   MAGNESIUM mg/dL 2.0 2.1 1.6  --   --   --   --    CHLORIDE mmol/L 101 106 107   < > 114*   < > 111*   CO2 mmol/L 22.1 20.1* 17.5*   < > 17.8*   < > 18.1*   BUN mg/dL 56* 54* 55*   < > 62*   < > 65*   CREATININE mg/dL 3.75* 3.45* 3.54*  --  3.95*  --  4.26*   EGFR IF NONAFRICN AM mL/min/1.73 12* 13* 13*   < > 11*   < > 11*   CALCIUM mg/dL 7.8* 7.0* 6.4*   < > 7.3*   < > 7.2*   IONIZED CALCIUM mmol/L  --   --  0.90*  --   --   --   --    PHOSPHORUS mg/dL 5.3* 5.7* 6.2*  --   --   --   --     < > = values in this interval not displayed.     Estimated Creatinine Clearance: 15.1 mL/min (A) (by C-G formula based on SCr of 3.75 mg/dL (H)).    Liver and pancreatic function:  Results from last 7 days   Lab Units 11/05/21 0719 11/04/21  1523 11/04/21  1233   ALBUMIN g/dL 3.46* 4.02 3.90   BILIRUBIN mg/dL <0.2 0.2 <0.2   ALK PHOS U/L 89 101 94   AST (SGOT) U/L 13 14 12   ALT (SGPT) U/L 10 10 10         Cardiac:      Liver and pancreatic function:  Results from last 7 days   Lab Units 11/05/21 0719 11/04/21  1523 11/04/21  1233   ALBUMIN g/dL 3.46* 4.02 3.90   BILIRUBIN mg/dL <0.2 0.2 <0.2   ALK PHOS U/L 89 101 94   AST (SGOT) U/L 13 14 12   ALT (SGPT) U/L 10 10 10       Medications :     calcitriol, 0.25 mcg, Oral, Daily  calcium acetate, 1,334 mg, Oral, TID With Meals  carvedilol, 12.5 mg, Oral, BID With Meals  cefdinir, 300 mg, Oral, Daily  cetirizine, 5 mg, Oral, Daily  clopidogrel, 75 mg, Oral, Daily  gabapentin, 300 mg, Oral, Nightly  heparin (porcine), 5,000 Units, Subcutaneous, Q12H  hydrALAZINE, 25 mg, Oral, Q8H  insulin aspart, 0-7 Units, Subcutaneous, TID AC  isosorbide mononitrate, 30 mg, Oral, Q24H  levothyroxine, 25 mcg, Oral,  Daily  pantoprazole, 40 mg, Oral, Q AM  pravastatin, 20 mg, Oral, Daily  rOPINIRole, 0.5 mg, Oral, BID  sodium chloride, 10 mL, Intravenous, Q12H  traZODone, 100 mg, Oral, Nightly      Pharmacy Consult,           Assessment/Plan     1.  Acute renal failure on CKD stage IV  2. Hyponatremia, mild to watch  5.  Anemia of chronic disease  6.  Secondary hyperparathyroidism  7.  Type 2 diabetes with nephropathy not biopsy-proven poor control  8.  Hypertension  9.  Thrombocytopenia    Cr continue to get worse, GFR relatively stable, no emergent indication for dialysis. Heptoglobin is normal   Sawyer TS 13, homocysteine, methylmalonic acid level are pending. Will give IV albumin today.   If GFR remains stable, will plan to discharge home and follow up with Dr Sandy in office and probable initiation on dialysis as outpatient.             Nicholas Arroyo MD  11/08/21  07:55 EST

## 2021-11-08 NOTE — PLAN OF CARE
Goal Outcome Evaluation:              Outcome Summary: Resting comfortably in bed.  Blood glucose better.  BP meds adjusted.  Continue to monitor.

## 2021-11-09 ENCOUNTER — READMISSION MANAGEMENT (OUTPATIENT)
Dept: CALL CENTER | Facility: HOSPITAL | Age: 61
End: 2021-11-09

## 2021-11-09 VITALS
OXYGEN SATURATION: 94 % | WEIGHT: 137.9 LBS | RESPIRATION RATE: 18 BRPM | TEMPERATURE: 98.1 F | HEIGHT: 65 IN | BODY MASS INDEX: 22.98 KG/M2 | SYSTOLIC BLOOD PRESSURE: 117 MMHG | HEART RATE: 78 BPM | DIASTOLIC BLOOD PRESSURE: 57 MMHG

## 2021-11-09 LAB
ALBUMIN SERPL-MCNC: 3.51 G/DL (ref 3.5–5.2)
ALBUMIN/GLOB SERPL: 1.4 G/DL
ALP SERPL-CCNC: 75 U/L (ref 39–117)
ALT SERPL W P-5'-P-CCNC: 14 U/L (ref 1–33)
ANION GAP SERPL CALCULATED.3IONS-SCNC: 11.5 MMOL/L (ref 5–15)
AST SERPL-CCNC: 14 U/L (ref 1–32)
BILIRUB SERPL-MCNC: 0.2 MG/DL (ref 0–1.2)
BUN SERPL-MCNC: 56 MG/DL (ref 8–23)
BUN/CREAT SERPL: 12.9 (ref 7–25)
CALCIUM SPEC-SCNC: 8.6 MG/DL (ref 8.6–10.5)
CHLORIDE SERPL-SCNC: 103 MMOL/L (ref 98–107)
CO2 SERPL-SCNC: 23.5 MMOL/L (ref 22–29)
CREAT SERPL-MCNC: 4.33 MG/DL (ref 0.57–1)
GFR SERPL CREATININE-BSD FRML MDRD: 10 ML/MIN/1.73
GFR SERPL CREATININE-BSD FRML MDRD: ABNORMAL ML/MIN/{1.73_M2}
GLOBULIN UR ELPH-MCNC: 2.5 GM/DL
GLUCOSE BLDC GLUCOMTR-MCNC: 149 MG/DL (ref 70–130)
GLUCOSE BLDC GLUCOMTR-MCNC: 156 MG/DL (ref 70–130)
GLUCOSE SERPL-MCNC: 167 MG/DL (ref 65–99)
POTASSIUM SERPL-SCNC: 5.5 MMOL/L (ref 3.5–5.2)
PROT SERPL-MCNC: 6 G/DL (ref 6–8.5)
SODIUM SERPL-SCNC: 138 MMOL/L (ref 136–145)

## 2021-11-09 PROCEDURE — 63710000001 INSULIN DETEMIR PER 5 UNITS: Performed by: INTERNAL MEDICINE

## 2021-11-09 PROCEDURE — 63710000001 INSULIN ASPART PER 5 UNITS: Performed by: INTERNAL MEDICINE

## 2021-11-09 PROCEDURE — 25010000002 HYDRALAZINE PER 20 MG: Performed by: INTERNAL MEDICINE

## 2021-11-09 PROCEDURE — 25010000002 HEPARIN (PORCINE) PER 1000 UNITS: Performed by: INTERNAL MEDICINE

## 2021-11-09 PROCEDURE — 25010000002 ALBUMIN HUMAN 25% PER 50 ML: Performed by: INTERNAL MEDICINE

## 2021-11-09 PROCEDURE — 82962 GLUCOSE BLOOD TEST: CPT

## 2021-11-09 PROCEDURE — 63710000001 INSULIN ASPART PER 5 UNITS: Performed by: PHYSICIAN ASSISTANT

## 2021-11-09 PROCEDURE — 99239 HOSP IP/OBS DSCHRG MGMT >30: CPT | Performed by: INTERNAL MEDICINE

## 2021-11-09 PROCEDURE — P9047 ALBUMIN (HUMAN), 25%, 50ML: HCPCS | Performed by: INTERNAL MEDICINE

## 2021-11-09 PROCEDURE — 80053 COMPREHEN METABOLIC PANEL: CPT | Performed by: INTERNAL MEDICINE

## 2021-11-09 RX ORDER — HYDRALAZINE HYDROCHLORIDE 50 MG/1
50 TABLET, FILM COATED ORAL EVERY 12 HOURS SCHEDULED
Qty: 60 TABLET | Refills: 0 | Status: SHIPPED | OUTPATIENT
Start: 2021-11-09 | End: 2022-01-10

## 2021-11-09 RX ORDER — INSULIN GLARGINE 100 [IU]/ML
10 INJECTION, SOLUTION SUBCUTANEOUS 2 TIMES DAILY
Qty: 6 ML | Refills: 0 | Status: SHIPPED | OUTPATIENT
Start: 2021-11-09 | End: 2022-01-10

## 2021-11-09 RX ORDER — GABAPENTIN 600 MG/1
300 TABLET ORAL NIGHTLY
Start: 2021-11-09 | End: 2022-05-11

## 2021-11-09 RX ORDER — CLONIDINE HYDROCHLORIDE 0.1 MG/1
0.1 TABLET ORAL 2 TIMES DAILY
Status: DISCONTINUED | OUTPATIENT
Start: 2021-11-09 | End: 2021-11-09 | Stop reason: HOSPADM

## 2021-11-09 RX ORDER — TRAZODONE HYDROCHLORIDE 100 MG/1
100 TABLET ORAL NIGHTLY
Qty: 10 TABLET | Refills: 0 | Status: SHIPPED | OUTPATIENT
Start: 2021-11-09 | End: 2022-07-10 | Stop reason: HOSPADM

## 2021-11-09 RX ORDER — HYDROCODONE BITARTRATE AND ACETAMINOPHEN 10; 325 MG/1; MG/1
1 TABLET ORAL EVERY 8 HOURS PRN
Status: ON HOLD
Start: 2021-11-09 | End: 2022-05-12

## 2021-11-09 RX ORDER — CALCITRIOL 0.25 UG/1
0.25 CAPSULE, LIQUID FILLED ORAL DAILY
Qty: 30 CAPSULE | Refills: 0 | Status: SHIPPED | OUTPATIENT
Start: 2021-11-10 | End: 2022-01-10

## 2021-11-09 RX ORDER — CEFDINIR 300 MG/1
300 CAPSULE ORAL DAILY
Qty: 3 CAPSULE | Refills: 0 | Status: SHIPPED | OUTPATIENT
Start: 2021-11-10 | End: 2021-11-13

## 2021-11-09 RX ORDER — ISOSORBIDE MONONITRATE 30 MG/1
30 TABLET, EXTENDED RELEASE ORAL
Qty: 30 TABLET | Refills: 0 | Status: SHIPPED | OUTPATIENT
Start: 2021-11-10 | End: 2022-01-10

## 2021-11-09 RX ORDER — CALCIUM ACETATE 667 MG/1
1334 CAPSULE ORAL
Qty: 180 CAPSULE | Refills: 0 | Status: SHIPPED | OUTPATIENT
Start: 2021-11-09 | End: 2021-12-09

## 2021-11-09 RX ORDER — HYDROXYZINE HYDROCHLORIDE 25 MG/1
25 TABLET, FILM COATED ORAL 3 TIMES DAILY PRN
Qty: 15 TABLET | Refills: 0 | Status: SHIPPED | OUTPATIENT
Start: 2021-11-09

## 2021-11-09 RX ADMIN — PRAVASTATIN SODIUM 20 MG: 40 TABLET ORAL at 08:10

## 2021-11-09 RX ADMIN — CALCIUM ACETATE 1334 MG: 667 CAPSULE ORAL at 11:24

## 2021-11-09 RX ADMIN — ROPINIROLE HYDROCHLORIDE 0.5 MG: 0.25 TABLET, FILM COATED ORAL at 08:10

## 2021-11-09 RX ADMIN — INSULIN ASPART 3 UNITS: 100 INJECTION, SOLUTION INTRAVENOUS; SUBCUTANEOUS at 08:08

## 2021-11-09 RX ADMIN — HYDRALAZINE HYDROCHLORIDE 10 MG: 20 INJECTION INTRAMUSCULAR; INTRAVENOUS at 04:26

## 2021-11-09 RX ADMIN — INSULIN ASPART 3 UNITS: 100 INJECTION, SOLUTION INTRAVENOUS; SUBCUTANEOUS at 11:24

## 2021-11-09 RX ADMIN — HEPARIN SODIUM 5000 UNITS: 5000 INJECTION INTRAVENOUS; SUBCUTANEOUS at 08:08

## 2021-11-09 RX ADMIN — CARVEDILOL 12.5 MG: 6.25 TABLET, FILM COATED ORAL at 08:10

## 2021-11-09 RX ADMIN — SODIUM CHLORIDE, PRESERVATIVE FREE 10 ML: 5 INJECTION INTRAVENOUS at 08:12

## 2021-11-09 RX ADMIN — ISOSORBIDE MONONITRATE 30 MG: 30 TABLET, EXTENDED RELEASE ORAL at 08:09

## 2021-11-09 RX ADMIN — HYDROXYZINE HYDROCHLORIDE 25 MG: 25 TABLET ORAL at 09:04

## 2021-11-09 RX ADMIN — ALBUMIN HUMAN 12.5 G: 0.25 SOLUTION INTRAVENOUS at 09:07

## 2021-11-09 RX ADMIN — CEFDINIR 300 MG: 300 CAPSULE ORAL at 08:10

## 2021-11-09 RX ADMIN — CALCIUM ACETATE 1334 MG: 667 CAPSULE ORAL at 08:09

## 2021-11-09 RX ADMIN — LEVOTHYROXINE SODIUM 25 MCG: 25 TABLET ORAL at 08:10

## 2021-11-09 RX ADMIN — HYDROCODONE BITARTRATE AND ACETAMINOPHEN 1 TABLET: 10; 325 TABLET ORAL at 09:07

## 2021-11-09 RX ADMIN — SODIUM ZIRCONIUM CYCLOSILICATE 10 G: 10 POWDER, FOR SUSPENSION ORAL at 13:31

## 2021-11-09 RX ADMIN — CALCITRIOL 0.25 MCG: 0.25 CAPSULE ORAL at 08:09

## 2021-11-09 RX ADMIN — INSULIN ASPART 2 UNITS: 100 INJECTION, SOLUTION INTRAVENOUS; SUBCUTANEOUS at 08:08

## 2021-11-09 RX ADMIN — FLUTICASONE PROPIONATE 2 SPRAY: 50 SPRAY, METERED NASAL at 08:11

## 2021-11-09 RX ADMIN — HYDRALAZINE HYDROCHLORIDE 50 MG: 50 TABLET, FILM COATED ORAL at 08:12

## 2021-11-09 RX ADMIN — INSULIN DETEMIR 8 UNITS: 100 INJECTION, SOLUTION SUBCUTANEOUS at 08:13

## 2021-11-09 RX ADMIN — CETIRIZINE HYDROCHLORIDE 5 MG: 10 TABLET, FILM COATED ORAL at 08:11

## 2021-11-09 RX ADMIN — CLOPIDOGREL 75 MG: 75 TABLET, FILM COATED ORAL at 08:10

## 2021-11-09 RX ADMIN — CLONIDINE HYDROCHLORIDE 0.1 MG: 0.1 TABLET ORAL at 08:10

## 2021-11-09 RX ADMIN — PANTOPRAZOLE SODIUM 40 MG: 40 TABLET, DELAYED RELEASE ORAL at 05:07

## 2021-11-09 NOTE — CASE MANAGEMENT/SOCIAL WORK
Discharge Planning Assessment   Jose Alfredo     Patient Name: Reny Elena  MRN: [id number]  Today's Date: 11/9/2021    Admit Date: 11/4/2021       Discharge Plan     Row Name 11/09/21 1503       Plan    Final Discharge Disposition Code 01 - home or self-care    Final Note Pt is being discharged home today. No needs identified.              DELROY Reaves

## 2021-11-09 NOTE — PLAN OF CARE
Goal Outcome Evaluation:           Progress: no change  Outcome Summary: Pt resting comfortably in bed at this time. Complaints of anxiety, PRN meds given. No other complaints or acute changes. Will continue to monitor.

## 2021-11-09 NOTE — PROGRESS NOTES
Nephrology Progress Note      Subjective     Pt feels fine, no complaints. No chest pain or shortness of breath    Objective       Vital signs :     Temp:  [98 °F (36.7 °C)-99.1 °F (37.3 °C)] 98.4 °F (36.9 °C)  Heart Rate:  [77-87] 87  Resp:  [18-20] 20  BP: ()/(48-98) 97/48      Intake/Output Summary (Last 24 hours) at 11/9/2021 1132  Last data filed at 11/9/2021 1000  Gross per 24 hour   Intake 720 ml   Output 2500 ml   Net -1780 ml       Physical Exam:    General Appearance : not in acute distress  Lungs : clear to auscultation, respirations regular  Heart :  regular rhythm & normal rate, normal S1, S2 and no murmur, no rub  Abdomen : normal bowel sounds, no masses, no hepatomegaly, no splenomegaly, soft non-tender and no guarding  Extremities : no edema, no cyanosis and no redness  Neurologic :   orientated to person, place, time and situation, Grossly no focal deficits      Laboratory Data :     Albumin Albumin   Date Value Ref Range Status   11/09/2021 3.51 3.50 - 5.20 g/dL Final      Magnesium Magnesium   Date Value Ref Range Status   11/08/2021 2.0 1.6 - 2.4 mg/dL Final   11/07/2021 2.1 1.6 - 2.4 mg/dL Final          PTH                 No results found for: PTH    CBC and coagulation:  Results from last 7 days   Lab Units 11/08/21  0131 11/07/21  1138 11/07/21  0421 11/06/21  1511 11/06/21  0553 11/05/21  0719 11/05/21  0719   WBC 10*3/mm3  --   --  6.00  --  6.43  --  5.31   HEMOGLOBIN g/dL 8.7* 9.8* 6.9*   < > 6.4*   < > 7.7*   HEMATOCRIT % 27.5* 30.4* 22.0*   < > 20.7*   < > 25.6*   MCV fL  --   --  96.5  --  98.6*  --  100.8*   MCHC g/dL  --   --  31.4*  --  30.9*  --  30.1*   PLATELETS 10*3/mm3  --   --  84*  --  88*  --  110*    < > = values in this interval not displayed.     Acid/base balance:  Results from last 7 days   Lab Units 11/04/21  2217   PH, ARTERIAL pH units 7.221*   PO2 ART mm Hg 61.7*   PCO2, ARTERIAL mm Hg 48.2*   HCO3 ART mmol/L 19.8*     Renal and electrolytes:  Results from  last 7 days   Lab Units 11/09/21  0806 11/08/21  0131 11/07/21  0421 11/06/21  0437 11/06/21  0437 11/05/21 0719 11/05/21 0719   SODIUM mmol/L 138 134* 139  --  138  --  145   POTASSIUM mmol/L 5.5* 5.0 4.6   < > 4.6   < > 4.7   MAGNESIUM mg/dL  --  2.0 2.1  --  1.6  --   --    CHLORIDE mmol/L 103 101 106   < > 107   < > 114*   CO2 mmol/L 23.5 22.1 20.1*   < > 17.5*   < > 17.8*   BUN mg/dL 56* 56* 54*   < > 55*   < > 62*   CREATININE mg/dL 4.33* 3.75* 3.45*  --  3.54*  --  3.95*   EGFR IF NONAFRICN AM mL/min/1.73 10* 12* 13*   < > 13*   < > 11*   CALCIUM mg/dL 8.6 7.8* 7.0*   < > 6.4*   < > 7.3*   IONIZED CALCIUM mmol/L  --   --   --   --  0.90*  --   --    PHOSPHORUS mg/dL  --  5.3* 5.7*  --  6.2*  --   --     < > = values in this interval not displayed.     Estimated Creatinine Clearance: 13.1 mL/min (A) (by C-G formula based on SCr of 4.33 mg/dL (H)).    Liver and pancreatic function:  Results from last 7 days   Lab Units 11/09/21  0806 11/05/21 0719 11/04/21  1523   ALBUMIN g/dL 3.51 3.46* 4.02   BILIRUBIN mg/dL 0.2 <0.2 0.2   ALK PHOS U/L 75 89 101   AST (SGOT) U/L 14 13 14   ALT (SGPT) U/L 14 10 10         Cardiac:      Liver and pancreatic function:  Results from last 7 days   Lab Units 11/09/21  0806 11/05/21 0719 11/04/21  1523   ALBUMIN g/dL 3.51 3.46* 4.02   BILIRUBIN mg/dL 0.2 <0.2 0.2   ALK PHOS U/L 75 89 101   AST (SGOT) U/L 14 13 14   ALT (SGPT) U/L 14 10 10       Medications :     calcitriol, 0.25 mcg, Oral, Daily  calcium acetate, 1,334 mg, Oral, TID With Meals  carvedilol, 12.5 mg, Oral, BID With Meals  cefdinir, 300 mg, Oral, Daily  cetirizine, 5 mg, Oral, Daily  cloNIDine, 0.1 mg, Oral, BID  clopidogrel, 75 mg, Oral, Daily  gabapentin, 300 mg, Oral, Nightly  heparin (porcine), 5,000 Units, Subcutaneous, Q12H  hydrALAZINE, 50 mg, Oral, Q12H  insulin aspart, 0-7 Units, Subcutaneous, TID AC  insulin aspart, 3 Units, Subcutaneous, TID With Meals  insulin detemir, 8 Units, Subcutaneous,  Daily  isosorbide mononitrate, 30 mg, Oral, Q24H  levothyroxine, 25 mcg, Oral, Daily  pantoprazole, 40 mg, Oral, Q AM  pravastatin, 20 mg, Oral, Daily  rOPINIRole, 0.5 mg, Oral, BID  sodium chloride, 10 mL, Intravenous, Q12H  traZODone, 100 mg, Oral, Nightly      Pharmacy Consult,           Assessment/Plan     1.  Acute renal failure on CKD stage IV  2. Hyponatremia, mild to watch  5.  Anemia of chronic disease  6.  Secondary hyperparathyroidism  7.  Type 2 diabetes with nephropathy not biopsy-proven poor control  8.  Hypertension  9.  Thrombocytopenia    Although eGFR is <15mlmin, no uremic symptoms and no signs of fluid overload with only mild hyperkalemia.   Discussed with the patient in detail about inpatient vs outpatient initiation on dialysis and will have follow up with Dr. Sandy in office for follow up and dialysis initiation as appropriate. Will stat the patient on Lokelma 10G today and then 5G daily until seen in office. Pt can be discharged from nephrology stands point.       Nicholas Arroyo MD  11/09/21  11:32 EST

## 2021-11-09 NOTE — DISCHARGE INSTR - APPOINTMENTS
Follow-up with PCP, Susan MCCOY on 11/15/21 at 10:15 am.  You can reach the office at [phone].

## 2021-11-09 NOTE — PLAN OF CARE
Goal Outcome Evaluation:         Pt has had no complaints or acute changes today. Spouse is at bedside awaiting for discharge.

## 2021-11-09 NOTE — SIGNIFICANT NOTE
[facility] Medicine Services  CROSS COVER NOTE    Contacted per RN. Patient with BP significantly elevated at 210/98. Patient sleeping, asymptomatic. Per review of vitals since hospitalization, patient's BP has been significantly labile with SBP >200 several times despite PO medications scheduled. I reviewed her home medication list, she is on 0.1 mg PO Clonidine BID at home, however this was held on admission. Patient likely with rebound hypertension, also HTN complicated by renal impairment. Home PO clonidine has been resumed. Continue with PRN hydralazine and other scheduled antihypertensive medications. Closely monitor BP per hospital protocol, titrate medications as necessary.       Marta Caceres PA-C  Hospitalist Service -- [facility]   Pager: [phone]    11/09/21  04:39 EST       Update: BP improved to 161/71 after PRN hydralazine. Continue orders per chart. Continue to monitor closely.

## 2021-11-09 NOTE — DISCHARGE SUMMARY
[facility]    Patient Identification:  Name:  Reny Elena  Age:  61 y.o.  Sex:  female  :  1960  MRN:  [id number]  Visit Number:  [id number]    Date of Admission: 2021  Date of Discharge:  2021     PCP: Susan Stephens APRN    DISCHARGE DIAGNOSIS  -Progressive CKD, approaching CKD-V (presumed diabetic nephropathy for nephrotic range proteinuria), I believe the pt will need HD soon.   -E. 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 female who was admitted on 2021 with worsening of her creatinine; we assume that it was an acute acute kidney injury but now we think it might be a progression of her diabetic nephropathy.  Her creatinine did improve with IV fluids but the GFR did not improve dramatically.  Nephrology has sent some more tests for Monday morning.  She at first had some large shifts in her blood pressures with her home clonidine and thus I changed it to Imdur and hydralazine. Pt creatinine is progressively worse, and will need HD, however, no emrgent signs as of yet.  Pt was asked to fu with her Nephrologist in the [facility]. Careful BP treatment without significant drop / riase was advised, see med recs.  Also her diabetic meds adjusted.  She was asked ot be off Gabapentin unless neuropathy is bothering, then it should be on ly 300 mg ond at night time  only.  She is scheduled to see Dr. Sandy tomorrow and is cleared by nephro (Dr. Arroyo for dc)         VITAL SIGNS:  Temp:  [98 °F (36.7 °C)-99.1 °F (37.3 °C)] 98.4 °F (36.9 °C)  Heart Rate:  [77-87] 87  Resp:  [18-20] 20  BP: ()/(48-98) 97/48  SpO2:  [92 %-97 %] 94 %  on  Flow (L/min):  [1] 1;   Device (Oxygen Therapy): nasal cannula    Body mass index is 22.95 kg/m².  Wt Readings from Last 3 Encounters:   11/09/21 62.6 kg (137 lb 14.4 oz)   06/23/20 53.1 kg (117 lb)   01/23/20 54.9 kg (121 lb)       PHYSICAL EXAM:  Constitutional:       General: She is not in acute distress.     Appearance: Normal appearance. She is not ill-appearing, toxic-appearing or diaphoretic.   HENT:      Head: Normocephalic and atraumatic.      Right Ear: External ear normal.      Left Ear: External ear normal.      Nose: Nose normal.   Eyes:      General: No scleral icterus.     Pupils: Pupils are equal, round, and reactive to light.   Cardiovascular:      Rate and Rhythm: Normal rate and regular rhythm.      Pulses: Normal pulses.      Heart sounds: No murmur heard.  Pulmonary:      Effort: Pulmonary effort is normal. No respiratory distress.      Breath sounds: No wheezing or rales.   Skin:     Coloration: Skin is not pale.      Findings: No bruising.   Neurological:      Mental Status: She is alert and oriented to person, place, and time. Mental status is at baseline.      Cranial Nerves: No cranial nerve deficit.   Psychiatric:         Mood and Affect: Mood normal.         Behavior: Behavior normal. Behavior is cooperative.     DISCHARGE DISPOSITION   Stable    DISCHARGE MEDICATIONS:     Discharge Medications      New Medications      Instructions Start Date   calcitriol 0.25 MCG capsule  Commonly known as: ROCALTROL   0.25 mcg, Oral, Daily   Start Date: November 10, 2021     calcium acetate 667 MG capsule capsule  Commonly known as: PHOS BINDER)   1,334 mg, Oral, 3 Times Daily With Meals      cefdinir 300 MG capsule  Commonly  known as: OMNICEF   300 mg, Oral, Daily   Start Date: November 10, 2021     hydrALAZINE 50 MG tablet  Commonly known as: APRESOLINE   50 mg, Oral, Every 12 Hours Scheduled      hydrOXYzine 25 MG tablet  Commonly known as: ATARAX   25 mg, Oral, 3 Times Daily PRN      insulin aspart 100 UNIT/ML injection  Commonly known as: novoLOG   3 Units, Subcutaneous, 3 Times Daily With Meals      isosorbide mononitrate 30 MG 24 hr tablet  Commonly known as: IMDUR   30 mg, Oral, Every 24 Hours Scheduled   Start Date: November 10, 2021     sodium zirconium cyclosilicate 10 g pack  Commonly known as: LOKELMA   5 g, Oral, Once         Changes to Medications      Instructions Start Date   HYDROcodone-acetaminophen  MG per tablet  Commonly known as: NORCO  What changed: when to take this   1 tablet, Oral, Every 8 Hours PRN, Pharmacy directions states every 4 to 6 hours prn pain.       insulin glargine 100 UNIT/ML injection  Commonly known as: LANTUS, SEMGLEE  What changed: how much to take   10 Units, Subcutaneous, 2 Times Daily         Continue These Medications      Instructions Start Date   carvedilol 12.5 MG tablet  Commonly known as: COREG   12.5 mg, Oral, 2 Times Daily With Meals      cetirizine 10 MG tablet  Commonly known as: zyrTEC   10 mg, Oral, Daily      clopidogrel 75 MG tablet  Commonly known as: PLAVIX   75 mg, Oral, Daily      fluticasone 50 MCG/ACT nasal spray  Commonly known as: FLONASE   2 sprays, Nasal, Daily PRN      levothyroxine 25 MCG tablet  Commonly known as: SYNTHROID, LEVOTHROID   25 mcg, Oral, Daily      pantoprazole 40 MG EC tablet  Commonly known as: PROTONIX   40 mg, Oral, Daily      pravastatin 20 MG tablet  Commonly known as: PRAVACHOL   20 mg, Oral, Daily      rOPINIRole 0.5 MG tablet  Commonly known as: REQUIP   0.5 mg, Oral, 2 Times Daily      tiZANidine 4 MG tablet  Commonly known as: ZANAFLEX   4 mg, Oral, Every 6 Hours PRN      traZODone 100 MG tablet  Commonly known as: DESYREL   100 mg,  Oral, Nightly         Stop These Medications    busPIRone 10 MG tablet  Commonly known as: BUSPAR     cloNIDine 0.1 MG tablet  Commonly known as: CATAPRES     insulin lispro 100 UNIT/ML injection  Commonly known as: humaLOG        ASK your doctor about these medications      Instructions Start Date   gabapentin 600 MG tablet  Commonly known as: NEURONTIN  Ask about: Which instructions should I use?   600 mg, Oral, 2 Times Daily      gabapentin 600 MG tablet  Commonly known as: NEURONTIN  Ask about: Which instructions should I use?   300 mg, Oral, Nightly             Diet Instructions     Diet: Renal, Consistent Carbohydrate      Discharge Diet:  Renal  Consistent Carbohydrate           Activity Instructions     Activity as Tolerated          No future appointments.     Follow-up Information     Susan Stephens APRN .    Specialty: Nurse Practitioner  Contact information:  [address] [place], [place] [id number] [phone]                          TEST  RESULTS PENDING AT DISCHARGE  Pending Labs     Order Current Status    [id number] Activity In process    Methylmalonic Acid, Serum In process    Peripheral Blood Smear In process           CODE STATUS  Code Status and Medical Interventions:   Ordered at: 11/05/21 0550     Code Status (Patient has no pulse and is not breathing):    CPR (Attempt to Resuscitate)     Medical Interventions (Patient has pulse or is breathing):    Full Support       Nelida Mcintyre MD  11/09/21  13:27 EST    Please note that this discharge summary required more than 30 minutes to complete.    Please send a copy of this dictation to the following providers:  Susan Stephens APRN

## 2021-11-10 ENCOUNTER — READMISSION MANAGEMENT (OUTPATIENT)
Dept: CALL CENTER | Facility: HOSPITAL | Age: 61
End: 2021-11-10

## 2021-11-10 LAB
CYTOLOGIST CVX/VAG CYTO: NORMAL
PATH INTERP BLD-IMP: NORMAL

## 2021-11-10 NOTE — OUTREACH NOTE
Prep Survey      Responses   [facility] patient discharged from? [facility]   Is LACE score < 7 ? No   Emergency Room discharge w/ pulse ox? No   Eligibility Readm Mgmt   Discharge diagnosis Progressive CKD,-E. coli UTI   Does the patient have one of the following disease processes/diagnoses(primary or secondary)? Other   Does the patient have Home health ordered? No   Is there a DME ordered? No   Prep survey completed? Yes          Chelsi Gilbert RN

## 2021-11-10 NOTE — OUTREACH NOTE
Medical Week 1 Survey      Responses   [facility] patient discharged from? [facility]   Does the patient have one of the following disease processes/diagnoses(primary or secondary)? Other   Week 1 attempt successful? Yes   Call start time 1621   Call end time 1625   Discharge diagnosis Progressive CKD,-E. coli UTI   Person spoke with today (if not patient) and relationship Cliff-Significant other   Meds reviewed with patient/caregiver? Yes   Is the patient having any side effects they believe may be caused by any medication additions or changes? No   Does the patient have all medications ordered at discharge? Yes   Is the patient taking all medications as directed (includes completed medication regime)? Yes   Does the patient have a primary care provider?  Yes   Does the patient have an appointment with their PCP within 7 days of discharge? Yes   Comments regarding PCP 11/15/21 @10:15am   Has the patient kept scheduled appointments due by today? N/A   Has home health visited the patient within 72 hours of discharge? N/A   Psychosocial issues? No   Did the patient receive a copy of their discharge instructions? Yes   Nursing interventions Reviewed instructions with patient   What is the patient's perception of their health status since discharge? Improving   Is the patient/caregiver able to teach back signs and symptoms related to disease process for when to call PCP? Yes   Is the patient/caregiver able to teach back signs and symptoms related to disease process for when to call 911? Yes   Is the patient/caregiver able to teach back the hierarchy of who to call/visit for symptoms/problems? PCP, Specialist, Home health nurse, Urgent Care, ED, 911 Yes   Week 1 call completed? Yes          KAMRAN KUNZ RN

## 2021-11-12 LAB — VWF CP ACT/NOR PPP CHRO: 80.5 %

## 2021-11-16 ENCOUNTER — READMISSION MANAGEMENT (OUTPATIENT)
Dept: CALL CENTER | Facility: HOSPITAL | Age: 61
End: 2021-11-16

## 2021-11-16 NOTE — OUTREACH NOTE
Medical Week 2 Survey      Responses   [facility] patient discharged from? [facility]   Does the patient have one of the following disease processes/diagnoses(primary or secondary)? Other   Week 2 attempt successful? Yes   Call start time 1756   Call end time 1757   Person spoke with today (if not patient) and relationship Cilff-Significant other   Meds reviewed with patient/caregiver? Yes   Is the patient taking all medications as directed (includes completed medication regime)? Yes   Has the patient kept scheduled appointments due by today? Yes   Comments Cliff, says uses scooter, doing some better   What is the patient's perception of their health status since discharge? Improving   Week 2 Call Completed? Yes   Wrap up additional comments Cliff, her friend, says she is some better, just laying around today, no new issue or questions today          Irina Vazquez, RN

## 2021-11-22 ENCOUNTER — READMISSION MANAGEMENT (OUTPATIENT)
Dept: CALL CENTER | Facility: HOSPITAL | Age: 61
End: 2021-11-22

## 2021-11-23 LAB
Lab: ABNORMAL
METHYLMALONATE SERPL-SCNC: 1934 NMOL/L (ref 0–378)

## 2021-11-26 ENCOUNTER — TRANSCRIBE ORDERS (OUTPATIENT)
Dept: LAB | Facility: HOSPITAL | Age: 61
End: 2021-11-26

## 2021-11-26 ENCOUNTER — LAB (OUTPATIENT)
Dept: LAB | Facility: HOSPITAL | Age: 61
End: 2021-11-26

## 2021-11-26 DIAGNOSIS — N25.81 SECONDARY HYPERPARATHYROIDISM, RENAL (HCC): Primary | ICD-10-CM

## 2021-11-26 DIAGNOSIS — N18.9 CHRONIC KIDNEY DISEASE, UNSPECIFIED CKD STAGE: ICD-10-CM

## 2021-11-26 DIAGNOSIS — N25.81 SECONDARY HYPERPARATHYROIDISM, RENAL (HCC): ICD-10-CM

## 2021-11-26 LAB
25(OH)D3 SERPL-MCNC: 23.9 NG/ML (ref 30–100)
ALBUMIN SERPL-MCNC: 3.96 G/DL (ref 3.5–5.2)
ALBUMIN/GLOB SERPL: 1.6 G/DL
ALP SERPL-CCNC: 134 U/L (ref 39–117)
ALT SERPL W P-5'-P-CCNC: 10 U/L (ref 1–33)
ANION GAP SERPL CALCULATED.3IONS-SCNC: 12.4 MMOL/L (ref 5–15)
AST SERPL-CCNC: 11 U/L (ref 1–32)
BASOPHILS # BLD AUTO: 0.04 10*3/MM3 (ref 0–0.2)
BASOPHILS NFR BLD AUTO: 0.7 % (ref 0–1.5)
BILIRUB SERPL-MCNC: 0.2 MG/DL (ref 0–1.2)
BUN SERPL-MCNC: 50 MG/DL (ref 8–23)
BUN/CREAT SERPL: 11.1 (ref 7–25)
CALCIUM SPEC-SCNC: 8.1 MG/DL (ref 8.6–10.5)
CHLORIDE SERPL-SCNC: 105 MMOL/L (ref 98–107)
CO2 SERPL-SCNC: 22.6 MMOL/L (ref 22–29)
CREAT SERPL-MCNC: 4.49 MG/DL (ref 0.57–1)
DEPRECATED RDW RBC AUTO: 46.9 FL (ref 37–54)
EOSINOPHIL # BLD AUTO: 0.24 10*3/MM3 (ref 0–0.4)
EOSINOPHIL NFR BLD AUTO: 4.4 % (ref 0.3–6.2)
ERYTHROCYTE [DISTWIDTH] IN BLOOD BY AUTOMATED COUNT: 13.4 % (ref 12.3–15.4)
FERRITIN SERPL-MCNC: 292.4 NG/ML (ref 13–150)
GFR SERPL CREATININE-BSD FRML MDRD: 10 ML/MIN/1.73
GFR SERPL CREATININE-BSD FRML MDRD: ABNORMAL ML/MIN/{1.73_M2}
GLOBULIN UR ELPH-MCNC: 2.5 GM/DL
GLUCOSE SERPL-MCNC: 265 MG/DL (ref 65–99)
HAV IGM SERPL QL IA: NORMAL
HBV CORE IGM SERPL QL IA: NORMAL
HBV SURFACE AG SERPL QL IA: NORMAL
HCT VFR BLD AUTO: 32 % (ref 34–46.6)
HCV AB SER DONR QL: NORMAL
HGB BLD-MCNC: 10.2 G/DL (ref 12–15.9)
IMM GRANULOCYTES # BLD AUTO: 0.02 10*3/MM3 (ref 0–0.05)
IMM GRANULOCYTES NFR BLD AUTO: 0.4 % (ref 0–0.5)
IRON 24H UR-MRATE: 104 MCG/DL (ref 37–145)
IRON SATN MFR SERPL: 42 % (ref 20–50)
LYMPHOCYTES # BLD AUTO: 1.43 10*3/MM3 (ref 0.7–3.1)
LYMPHOCYTES NFR BLD AUTO: 26.2 % (ref 19.6–45.3)
MCH RBC QN AUTO: 30.3 PG (ref 26.6–33)
MCHC RBC AUTO-ENTMCNC: 31.9 G/DL (ref 31.5–35.7)
MCV RBC AUTO: 95 FL (ref 79–97)
MONOCYTES # BLD AUTO: 0.29 10*3/MM3 (ref 0.1–0.9)
MONOCYTES NFR BLD AUTO: 5.3 % (ref 5–12)
NEUTROPHILS NFR BLD AUTO: 3.44 10*3/MM3 (ref 1.7–7)
NEUTROPHILS NFR BLD AUTO: 63 % (ref 42.7–76)
NRBC BLD AUTO-RTO: 0 /100 WBC (ref 0–0.2)
PHOSPHATE SERPL-MCNC: 5.1 MG/DL (ref 2.5–4.5)
PLATELET # BLD AUTO: 162 10*3/MM3 (ref 140–450)
PMV BLD AUTO: 10.6 FL (ref 6–12)
POTASSIUM SERPL-SCNC: 5 MMOL/L (ref 3.5–5.2)
PROT SERPL-MCNC: 6.5 G/DL (ref 6–8.5)
PTH-INTACT SERPL-MCNC: 298.1 PG/ML (ref 15–65)
RBC # BLD AUTO: 3.37 10*6/MM3 (ref 3.77–5.28)
SODIUM SERPL-SCNC: 140 MMOL/L (ref 136–145)
TIBC SERPL-MCNC: 249 MCG/DL (ref 298–536)
TRANSFERRIN SERPL-MCNC: 167 MG/DL (ref 200–360)
WBC NRBC COR # BLD: 5.46 10*3/MM3 (ref 3.4–10.8)

## 2021-11-26 PROCEDURE — 36415 COLL VENOUS BLD VENIPUNCTURE: CPT

## 2021-11-26 PROCEDURE — 80053 COMPREHEN METABOLIC PANEL: CPT

## 2021-11-26 PROCEDURE — 80074 ACUTE HEPATITIS PANEL: CPT

## 2021-11-26 PROCEDURE — 84100 ASSAY OF PHOSPHORUS: CPT

## 2021-11-26 PROCEDURE — 82306 VITAMIN D 25 HYDROXY: CPT

## 2021-11-26 PROCEDURE — 83970 ASSAY OF PARATHORMONE: CPT

## 2021-11-26 PROCEDURE — 83540 ASSAY OF IRON: CPT

## 2021-11-26 PROCEDURE — 84466 ASSAY OF TRANSFERRIN: CPT

## 2021-11-26 PROCEDURE — 85025 COMPLETE CBC W/AUTO DIFF WBC: CPT

## 2021-11-26 PROCEDURE — 82728 ASSAY OF FERRITIN: CPT

## 2021-11-30 ENCOUNTER — READMISSION MANAGEMENT (OUTPATIENT)
Dept: CALL CENTER | Facility: HOSPITAL | Age: 61
End: 2021-11-30

## 2021-11-30 NOTE — OUTREACH NOTE
Medical Week 4 Survey      Responses   [facility] patient discharged from? [facility]   Does the patient have one of the following disease processes/diagnoses(primary or secondary)? Other   Week 4 attempt successful? Yes   Call start time 1412   Call end time 1418   Discharge diagnosis Progressive CKD,-E. coli UTI   Person spoke with today (if not patient) and relationship Cliff-Significant other   Meds reviewed with patient/caregiver? Yes   Is the patient taking all medications as directed (includes completed medication regime)? Yes   Medication comments Resumed Clonidine   Has the patient kept scheduled appointments due by today? Yes   Comments Dr Sandy second appt tomorrow   Is the patient still receiving Home Health Services? N/A   What is the patient's perception of their health status since discharge? Same   Week 4 Call Completed? Yes   Would the patient like one additional call? No   Graduated Yes   Is the patient interested in additional calls from an ambulatory ?  NOTE:  applies to high risk patients requiring additional follow-up. No   Did the patient feel the follow up calls were helpful during their recovery period? Yes   Wrap up additional comments Cliff says they hav appt tomorrow with Dr Sandy to find out if she will start dialysis. Instructed on Nurse Call Center.          Cindy Garzon RN

## 2021-12-09 ENCOUNTER — OFFICE VISIT (OUTPATIENT)
Dept: SURGERY | Facility: CLINIC | Age: 61
End: 2021-12-09

## 2021-12-09 VITALS
DIASTOLIC BLOOD PRESSURE: 90 MMHG | HEIGHT: 65 IN | SYSTOLIC BLOOD PRESSURE: 142 MMHG | WEIGHT: 127 LBS | BODY MASS INDEX: 21.16 KG/M2

## 2021-12-09 DIAGNOSIS — N18.9 CHRONIC KIDNEY DISEASE, UNSPECIFIED CKD STAGE: Primary | ICD-10-CM

## 2021-12-09 PROBLEM — N19 RENAL FAILURE: Status: ACTIVE | Noted: 2021-11-05

## 2021-12-09 PROCEDURE — 99203 OFFICE O/P NEW LOW 30 MIN: CPT | Performed by: SURGERY

## 2021-12-09 RX ORDER — CLONIDINE HYDROCHLORIDE 0.1 MG/1
0.1 TABLET ORAL 3 TIMES DAILY PRN
COMMUNITY
Start: 2021-11-15 | End: 2022-07-10 | Stop reason: HOSPADM

## 2021-12-09 RX ORDER — ONDANSETRON 4 MG/1
TABLET, ORALLY DISINTEGRATING ORAL
COMMUNITY
Start: 2021-11-22 | End: 2022-01-10

## 2021-12-09 NOTE — PROGRESS NOTES
Subjective   Reny Elena is a 61 y.o. female.     Chief Complaint: renal failure    History of Present Illness She had a recent labs that showed renal disease and had no symptoms. She was recommended to have a peritoneal dialysis catheter placed.    The following portions of the patient's history were reviewed and updated as appropriate: current medications, past family history, past medical history, past social history, past surgical history and problem list.    Review of Systems   Constitutional: Negative for activity change, appetite change, chills, fever and unexpected weight change.   HENT: Negative for congestion, facial swelling and sore throat.    Eyes: Negative for photophobia and visual disturbance.   Respiratory: Negative for chest tightness, shortness of breath and wheezing.    Cardiovascular: Negative for chest pain, palpitations and leg swelling.   Gastrointestinal: Negative for abdominal distention, abdominal pain, anal bleeding, blood in stool, constipation, diarrhea, nausea, rectal pain and vomiting.   Endocrine: Negative for cold intolerance, heat intolerance, polydipsia and polyuria.   Genitourinary: Negative for difficulty urinating, dysuria, flank pain and urgency.   Musculoskeletal: Negative for back pain and myalgias.   Skin: Negative for rash and wound.   Allergic/Immunologic: Negative for immunocompromised state.   Neurological: Negative for dizziness, seizures, syncope, light-headedness, numbness and headaches.   Hematological: Negative for adenopathy. Does not bruise/bleed easily.   Psychiatric/Behavioral: Negative for behavioral problems and confusion. The patient is not nervous/anxious.        Objective   Physical Exam  Vitals reviewed.   Constitutional:       General: She is not in acute distress.     Appearance: She is well-developed. She is not ill-appearing.   HENT:      Head: Normocephalic. No laceration. Hair is normal.      Right Ear: Hearing and ear canal normal.      Left  Ear: Hearing and ear canal normal.      Nose: Nose normal.      Right Sinus: No maxillary sinus tenderness or frontal sinus tenderness.      Left Sinus: No maxillary sinus tenderness or frontal sinus tenderness.   Eyes:      General: Lids are normal.      Conjunctiva/sclera: Conjunctivae normal.      Pupils: Pupils are equal, round, and reactive to light.   Neck:      Thyroid: No thyroid mass or thyromegaly.      Vascular: No JVD.      Trachea: No tracheal tenderness or tracheal deviation.   Cardiovascular:      Rate and Rhythm: Normal rate and regular rhythm.      Heart sounds: No murmur heard.  No gallop.    Pulmonary:      Effort: Pulmonary effort is normal.      Breath sounds: Normal breath sounds. No stridor. No wheezing.   Chest:      Chest wall: No tenderness.   Breasts:      Right: No supraclavicular adenopathy.      Left: No supraclavicular adenopathy.       Abdominal:      General: Bowel sounds are normal. There is no distension.      Palpations: Abdomen is soft. There is no mass.      Tenderness: There is no abdominal tenderness. There is no guarding or rebound.      Hernia: No hernia is present.   Musculoskeletal:         General: No deformity.      Cervical back: Normal range of motion.   Lymphadenopathy:      Cervical: No cervical adenopathy.      Upper Body:      Right upper body: No supraclavicular adenopathy.      Left upper body: No supraclavicular adenopathy.   Skin:     General: Skin is warm and dry.      Coloration: Skin is not pale.      Findings: No erythema or rash.   Neurological:      Mental Status: She is alert and oriented to person, place, and time.      Motor: No abnormal muscle tone.   Psychiatric:         Behavior: Behavior normal.         Thought Content: Thought content normal.         Past Medical History:   Diagnosis Date   • Arthritis    • Closed fracture of right fibula and tibia 12/25/2018   • Depression    • Diabetic ulcer of left foot associated with type 2 diabetes mellitus  ([facility]) 2017   • Diastolic dysfunction    • Essential hypertension    • Hyperlipidemia    • Iron deficiency anemia 2018   • PAD (peripheral artery disease) (Formerly [facility])    • Type 2 diabetes mellitus with hyperglycemia, with long-term current use of insulin ([facility]) 2018       Family History   Problem Relation Age of Onset   • Heart disease Mother    • Cancer Mother    • COPD Mother    • Diabetes Other    • Depression Other        Social History     Tobacco Use   • Smoking status: Never Smoker   • Smokeless tobacco: Never Used   Vaping Use   • Vaping Use: Never used   Substance Use Topics   • Alcohol use: No   • Drug use: No       Past Surgical History:   Procedure Laterality Date   • BACK SURGERY      Post spinal diskectomy, osteophytectomy in one lumbar interspace   • CATARACT EXTRACTION      Left    •  SECTION     • DENTAL PROCEDURE     • HYSTERECTOMY     • INCISION AND DRAINAGE LEG Left 4/15/2017    Procedure: INCISION AND DRAINAGE LOWER EXTREMITY;  Surgeon: Basilio Morris MD;  Location: [facility];  Service:    • INCISION AND DRAINAGE LEG Left 2017    Procedure: Irrigation and Debridment abcess diabetic wound left foot with deep culture;  Surgeon: Basilio Morris MD;  Location: [facility] OR;  Service:    • KNEE ARTHROSCOPY Right    • ORIF TIBIA FRACTURE Right 2018    Procedure: TIBIAL  FRACTURE OPEN REDUCTION INTERNAL FIXATION;  Surgeon: Jung Barragan MD;  Location: [facility] OR;  Service: Orthopedics   • TOE AMPUTATION Right     5th   • TUBAL ABDOMINAL LIGATION         Current Outpatient Medications   Medication Instructions   • calcitriol (ROCALTROL) 0.25 mcg, Oral, Daily   • calcium acetate (PHOS BINDER)) 1,334 mg, Oral, 3 Times Daily With Meals   • carvedilol (COREG) 12.5 mg, Oral, 2 Times Daily With Meals   • cetirizine (ZYRTEC) 10 mg, Oral, Daily   • cloNIDine (CATAPRES) 0.1 MG tablet No dose, route, or frequency recorded.   • clopidogrel (PLAVIX) 75 mg, Oral, Daily   • fluticasone  (FLONASE) 50 MCG/ACT nasal spray 2 sprays, Nasal, Daily PRN   • gabapentin (NEURONTIN) 300 mg, Oral, Nightly   • hydrALAZINE (APRESOLINE) 50 mg, Oral, Every 12 Hours Scheduled   • HYDROcodone-acetaminophen (NORCO)  MG per tablet 1 tablet, Oral, Every 8 Hours PRN, Pharmacy directions states every 4 to 6 hours prn pain.    • hydrOXYzine (ATARAX) 25 mg, Oral, 3 Times Daily PRN   • insulin aspart (NOVOLOG) 3 Units, Subcutaneous, 3 Times Daily With Meals   • insulin glargine (LANTUS, SEMGLEE) 10 Units, Subcutaneous, 2 Times Daily   • isosorbide mononitrate (IMDUR) 30 mg, Oral, Every 24 Hours Scheduled   • levothyroxine (SYNTHROID, LEVOTHROID) 25 mcg, Oral, Daily   • ondansetron ODT (ZOFRAN-ODT) 4 MG disintegrating tablet No dose, route, or frequency recorded.   • pantoprazole (PROTONIX) 40 mg, Oral, Daily   • pravastatin (PRAVACHOL) 20 mg, Oral, Daily   • rOPINIRole (REQUIP) 0.5 mg, Oral, 2 Times Daily   • sodium zirconium cyclosilicate (LOKELMA) 5 g, Oral, Daily   • tiZANidine (ZANAFLEX) 4 mg, Oral, Every 6 Hours PRN   • traZODone (DESYREL) 100 mg, Oral, Nightly         Assessment/Plan   Diagnoses and all orders for this visit:    1. Chronic kidney disease, unspecified CKD stage (Primary)    place peritoneal dialysis catheter

## 2021-12-09 NOTE — H&P (VIEW-ONLY)
Subjective   Reny Elena is a 61 y.o. female.     Chief Complaint: renal failure    History of Present Illness She had a recent labs that showed renal disease and had no symptoms. She was recommended to have a peritoneal dialysis catheter placed.    The following portions of the patient's history were reviewed and updated as appropriate: current medications, past family history, past medical history, past social history, past surgical history and problem list.    Review of Systems   Constitutional: Negative for activity change, appetite change, chills, fever and unexpected weight change.   HENT: Negative for congestion, facial swelling and sore throat.    Eyes: Negative for photophobia and visual disturbance.   Respiratory: Negative for chest tightness, shortness of breath and wheezing.    Cardiovascular: Negative for chest pain, palpitations and leg swelling.   Gastrointestinal: Negative for abdominal distention, abdominal pain, anal bleeding, blood in stool, constipation, diarrhea, nausea, rectal pain and vomiting.   Endocrine: Negative for cold intolerance, heat intolerance, polydipsia and polyuria.   Genitourinary: Negative for difficulty urinating, dysuria, flank pain and urgency.   Musculoskeletal: Negative for back pain and myalgias.   Skin: Negative for rash and wound.   Allergic/Immunologic: Negative for immunocompromised state.   Neurological: Negative for dizziness, seizures, syncope, light-headedness, numbness and headaches.   Hematological: Negative for adenopathy. Does not bruise/bleed easily.   Psychiatric/Behavioral: Negative for behavioral problems and confusion. The patient is not nervous/anxious.        Objective   Physical Exam  Vitals reviewed.   Constitutional:       General: She is not in acute distress.     Appearance: She is well-developed. She is not ill-appearing.   HENT:      Head: Normocephalic. No laceration. Hair is normal.      Right Ear: Hearing and ear canal normal.      Left  Ear: Hearing and ear canal normal.      Nose: Nose normal.      Right Sinus: No maxillary sinus tenderness or frontal sinus tenderness.      Left Sinus: No maxillary sinus tenderness or frontal sinus tenderness.   Eyes:      General: Lids are normal.      Conjunctiva/sclera: Conjunctivae normal.      Pupils: Pupils are equal, round, and reactive to light.   Neck:      Thyroid: No thyroid mass or thyromegaly.      Vascular: No JVD.      Trachea: No tracheal tenderness or tracheal deviation.   Cardiovascular:      Rate and Rhythm: Normal rate and regular rhythm.      Heart sounds: No murmur heard.  No gallop.    Pulmonary:      Effort: Pulmonary effort is normal.      Breath sounds: Normal breath sounds. No stridor. No wheezing.   Chest:      Chest wall: No tenderness.   Breasts:      Right: No supraclavicular adenopathy.      Left: No supraclavicular adenopathy.       Abdominal:      General: Bowel sounds are normal. There is no distension.      Palpations: Abdomen is soft. There is no mass.      Tenderness: There is no abdominal tenderness. There is no guarding or rebound.      Hernia: No hernia is present.   Musculoskeletal:         General: No deformity.      Cervical back: Normal range of motion.   Lymphadenopathy:      Cervical: No cervical adenopathy.      Upper Body:      Right upper body: No supraclavicular adenopathy.      Left upper body: No supraclavicular adenopathy.   Skin:     General: Skin is warm and dry.      Coloration: Skin is not pale.      Findings: No erythema or rash.   Neurological:      Mental Status: She is alert and oriented to person, place, and time.      Motor: No abnormal muscle tone.   Psychiatric:         Behavior: Behavior normal.         Thought Content: Thought content normal.         Past Medical History:   Diagnosis Date   • Arthritis    • Closed fracture of right fibula and tibia 12/25/2018   • Depression    • Diabetic ulcer of left foot associated with type 2 diabetes mellitus  ([facility]) 2017   • Diastolic dysfunction    • Essential hypertension    • Hyperlipidemia    • Iron deficiency anemia 2018   • PAD (peripheral artery disease) ([facility])    • Type 2 diabetes mellitus with hyperglycemia, with long-term current use of insulin ([facility]) 2018       Family History   Problem Relation Age of Onset   • Heart disease Mother    • Cancer Mother    • COPD Mother    • Diabetes Other    • Depression Other        Social History     Tobacco Use   • Smoking status: Never Smoker   • Smokeless tobacco: Never Used   Vaping Use   • Vaping Use: Never used   Substance Use Topics   • Alcohol use: No   • Drug use: No       Past Surgical History:   Procedure Laterality Date   • BACK SURGERY      Post spinal diskectomy, osteophytectomy in one lumbar interspace   • CATARACT EXTRACTION      Left    •  SECTION     • DENTAL PROCEDURE     • HYSTERECTOMY     • INCISION AND DRAINAGE LEG Left 4/15/2017    Procedure: INCISION AND DRAINAGE LOWER EXTREMITY;  Surgeon: Basilio Morris MD;  Location: [facility];  Service:    • INCISION AND DRAINAGE LEG Left 2017    Procedure: Irrigation and Debridment abcess diabetic wound left foot with deep culture;  Surgeon: Basilio Morris MD;  Location: [facility] OR;  Service:    • KNEE ARTHROSCOPY Right    • ORIF TIBIA FRACTURE Right 2018    Procedure: TIBIAL  FRACTURE OPEN REDUCTION INTERNAL FIXATION;  Surgeon: Jung Barragan MD;  Location: [facility] OR;  Service: Orthopedics   • TOE AMPUTATION Right     5th   • TUBAL ABDOMINAL LIGATION         Current Outpatient Medications   Medication Instructions   • calcitriol (ROCALTROL) 0.25 mcg, Oral, Daily   • calcium acetate (PHOS BINDER)) 1,334 mg, Oral, 3 Times Daily With Meals   • carvedilol (COREG) 12.5 mg, Oral, 2 Times Daily With Meals   • cetirizine (ZYRTEC) 10 mg, Oral, Daily   • cloNIDine (CATAPRES) 0.1 MG tablet No dose, route, or frequency recorded.   • clopidogrel (PLAVIX) 75 mg, Oral, Daily   • fluticasone  (FLONASE) 50 MCG/ACT nasal spray 2 sprays, Nasal, Daily PRN   • gabapentin (NEURONTIN) 300 mg, Oral, Nightly   • hydrALAZINE (APRESOLINE) 50 mg, Oral, Every 12 Hours Scheduled   • HYDROcodone-acetaminophen (NORCO)  MG per tablet 1 tablet, Oral, Every 8 Hours PRN, Pharmacy directions states every 4 to 6 hours prn pain.    • hydrOXYzine (ATARAX) 25 mg, Oral, 3 Times Daily PRN   • insulin aspart (NOVOLOG) 3 Units, Subcutaneous, 3 Times Daily With Meals   • insulin glargine (LANTUS, SEMGLEE) 10 Units, Subcutaneous, 2 Times Daily   • isosorbide mononitrate (IMDUR) 30 mg, Oral, Every 24 Hours Scheduled   • levothyroxine (SYNTHROID, LEVOTHROID) 25 mcg, Oral, Daily   • ondansetron ODT (ZOFRAN-ODT) 4 MG disintegrating tablet No dose, route, or frequency recorded.   • pantoprazole (PROTONIX) 40 mg, Oral, Daily   • pravastatin (PRAVACHOL) 20 mg, Oral, Daily   • rOPINIRole (REQUIP) 0.5 mg, Oral, 2 Times Daily   • sodium zirconium cyclosilicate (LOKELMA) 5 g, Oral, Daily   • tiZANidine (ZANAFLEX) 4 mg, Oral, Every 6 Hours PRN   • traZODone (DESYREL) 100 mg, Oral, Nightly         Assessment/Plan   Diagnoses and all orders for this visit:    1. Chronic kidney disease, unspecified CKD stage (Primary)    place peritoneal dialysis catheter

## 2021-12-10 ENCOUNTER — TELEPHONE (OUTPATIENT)
Dept: SURGERY | Facility: CLINIC | Age: 61
End: 2021-12-10

## 2021-12-10 NOTE — TELEPHONE ENCOUNTER
PAT Tue 12/14 @ 12:15 with Covid test after pre-op.  Surgery Thursday 12/16 @ 8 am. Patient needs to be NPO and have a  with her.   Plavix  Noted in chart, if patient is taking it it will need DC 5 days prior.

## 2021-12-13 NOTE — DISCHARGE INSTRUCTIONS
TAKE the following medications the morning of surgery:    All heart or blood pressure medications    Please discontinue all blood thinners and anticoagulants (except aspirin) prior to surgery as per your surgeon and cardiologist instructions.  Aspirin may be continued up to the day prior to surgery.    HOLD all diabetic medications the morning of surgery as order by physician.    Please follow instructions on use of prep cloths provided by nurse. Return instruction sheet to pre-op nurse on day of surgery.    Arrival time for surgery on 12/16/21 will be given to you by Dr. Glover's office.    A RESPONSIBLE PERSON MUST REMAIN IN THE WAITING ROOM DURING YOUR PROCEDURE AND A RESPONSIBLE  MUST BE AVAILABLE UPON YOUR DISCHARGE.    General Instructions:  • Do NOT eat or drink after midnight 12/15/21 which includes water, mints, or gum.  • You may brush your teeth. Dental appliances that are removable must be taken out day of surgery.  • Do NOT smoke, chew tobacco, or drink alcohol within 24 hours prior to surgery.  • Bring medications in original bottles, any inhalers and if applicable your C-PAP/BI-PAP machine  • Bring any papers given to you in the doctor’s office  • Wear clean, comfortable clothes and socks  • Do NOT wear contact lenses or make-up or dark nail polish.  Bring a case for your glasses if applicable.  • Bring crutches or walker if applicable  • Leave all other valuables and jewelry at home  • If you were given a blood bank armband, continue to wear it until discharged.    Preventing a Surgical Site Infection:  • Shower the night before surgery (unless instructed otherwise) using a fresh bar of anti-bacterial soap (such as Dial) and clean washcloth.  Dry with a clean towel and dress in clean clothing.  • For 2 to 3 days before surgery, avoid shaving with a razor near where you will have surgery because the razor can irritate skin and make it easier to develop an infection.  Ask your surgeon if you will  be receiving antibiotics prior to surgery.  • Make sure you, your family, and all healthcare providers clean their hands with soap and water or an alcohol-based hand  before caring for you or your wound.  • If at all possible, quit smoking as many days before surgery as you can.    Day of Surgery:  Upon arrival, a pre-op nurse and anesthesiologist will review your health history, obtain vital signs, and answer questions you may have.  The only belongings needed at this time will be your home medications and if applicable you C-PAP/BI-PAP machine.  If you are staying overnight, your family can leave the rest of your belongings in the car and bring them to your room later.  A pre-op nurse will start an IV and you may receive medication in preparation for surgery.  Due to patient privacy and limited space, only one member of your family will be able to accompany you in the pre-op area.  While you are in surgery your family should notify the waiting room  if they leave the waiting room area and provide a contact number.  Please be aware that surgery does come with discomfort.  We want to make every effort to control your discomfort so please discuss any uncontrolled symptoms with your nurse.  Your doctor will most likely have prescribed pain medications.  If you are going home after surgery you will receive individualized written care instructions before being discharged.  A responsible adult must drive you to and from the hospital on the day of surgery and stay with you for 24 hours.  If you are staying overnight following surgery, you will be transported to your hospital room following the recovery period.

## 2021-12-14 ENCOUNTER — LAB (OUTPATIENT)
Dept: LAB | Facility: HOSPITAL | Age: 61
End: 2021-12-14

## 2021-12-14 ENCOUNTER — PRE-ADMISSION TESTING (OUTPATIENT)
Dept: PREADMISSION TESTING | Facility: HOSPITAL | Age: 61
End: 2021-12-14

## 2021-12-14 DIAGNOSIS — N18.9 CHRONIC KIDNEY DISEASE, UNSPECIFIED CKD STAGE: ICD-10-CM

## 2021-12-14 LAB
FLUAV RNA RESP QL NAA+PROBE: NOT DETECTED
FLUBV RNA RESP QL NAA+PROBE: NOT DETECTED
SARS-COV-2 RNA RESP QL NAA+PROBE: NOT DETECTED

## 2021-12-14 PROCEDURE — 87636 SARSCOV2 & INF A&B AMP PRB: CPT | Performed by: SURGERY

## 2021-12-14 PROCEDURE — C9803 HOPD COVID-19 SPEC COLLECT: HCPCS

## 2021-12-16 ENCOUNTER — ANESTHESIA (OUTPATIENT)
Dept: PERIOP | Facility: HOSPITAL | Age: 61
End: 2021-12-16

## 2021-12-16 ENCOUNTER — APPOINTMENT (OUTPATIENT)
Dept: GENERAL RADIOLOGY | Facility: HOSPITAL | Age: 61
End: 2021-12-16

## 2021-12-16 ENCOUNTER — HOSPITAL ENCOUNTER (EMERGENCY)
Facility: HOSPITAL | Age: 61
Discharge: LEFT BEFORE DISCHARGE | End: 2021-12-16
Admitting: STUDENT IN AN ORGANIZED HEALTH CARE EDUCATION/TRAINING PROGRAM

## 2021-12-16 ENCOUNTER — ANESTHESIA EVENT (OUTPATIENT)
Dept: PERIOP | Facility: HOSPITAL | Age: 61
End: 2021-12-16

## 2021-12-16 ENCOUNTER — HOSPITAL ENCOUNTER (OUTPATIENT)
Facility: HOSPITAL | Age: 61
Setting detail: HOSPITAL OUTPATIENT SURGERY
Discharge: HOME | End: 2021-12-16
Attending: SURGERY | Admitting: SURGERY

## 2021-12-16 VITALS
TEMPERATURE: 97.7 F | SYSTOLIC BLOOD PRESSURE: 164 MMHG | DIASTOLIC BLOOD PRESSURE: 87 MMHG | RESPIRATION RATE: 18 BRPM | WEIGHT: 125 LBS | HEIGHT: 65 IN | HEART RATE: 72 BPM | BODY MASS INDEX: 20.83 KG/M2 | OXYGEN SATURATION: 98 %

## 2021-12-16 VITALS
TEMPERATURE: 98.3 F | BODY MASS INDEX: 20.66 KG/M2 | DIASTOLIC BLOOD PRESSURE: 88 MMHG | RESPIRATION RATE: 16 BRPM | OXYGEN SATURATION: 95 % | HEIGHT: 65 IN | SYSTOLIC BLOOD PRESSURE: 183 MMHG | HEART RATE: 87 BPM | WEIGHT: 124 LBS

## 2021-12-16 DIAGNOSIS — N18.9 CHRONIC KIDNEY DISEASE, UNSPECIFIED CKD STAGE: Primary | ICD-10-CM

## 2021-12-16 LAB — GLUCOSE BLDC GLUCOMTR-MCNC: 250 MG/DL (ref 70–130)

## 2021-12-16 PROCEDURE — 25010000002 DROPERIDOL PER 5 MG: Performed by: NURSE ANESTHETIST, CERTIFIED REGISTERED

## 2021-12-16 PROCEDURE — 25010000002 PROPOFOL 10 MG/ML EMULSION: Performed by: NURSE ANESTHETIST, CERTIFIED REGISTERED

## 2021-12-16 PROCEDURE — 49324 LAP INSERT TUNNEL IP CATH: CPT | Performed by: SURGERY

## 2021-12-16 PROCEDURE — 25010000002 FENTANYL CITRATE (PF) 50 MCG/ML SOLUTION: Performed by: NURSE ANESTHETIST, CERTIFIED REGISTERED

## 2021-12-16 PROCEDURE — 99283 EMERGENCY DEPT VISIT LOW MDM: CPT

## 2021-12-16 PROCEDURE — C1750 CATH, HEMODIALYSIS,LONG-TERM: HCPCS | Performed by: SURGERY

## 2021-12-16 PROCEDURE — 25010000002 NEOSTIGMINE 10 MG/10ML SOLUTION: Performed by: NURSE ANESTHETIST, CERTIFIED REGISTERED

## 2021-12-16 PROCEDURE — 82962 GLUCOSE BLOOD TEST: CPT

## 2021-12-16 PROCEDURE — 25010000002 ONDANSETRON PER 1 MG: Performed by: NURSE ANESTHETIST, CERTIFIED REGISTERED

## 2021-12-16 PROCEDURE — 25010000002 HYDRALAZINE PER 20 MG: Performed by: ANESTHESIOLOGY

## 2021-12-16 DEVICE — KT CATH DIAL PERITONEAL CURL 2CUFF 62CM: Type: IMPLANTABLE DEVICE | Site: ABDOMEN | Status: FUNCTIONAL

## 2021-12-16 RX ORDER — FENTANYL CITRATE 50 UG/ML
INJECTION, SOLUTION INTRAMUSCULAR; INTRAVENOUS AS NEEDED
Status: DISCONTINUED | OUTPATIENT
Start: 2021-12-16 | End: 2021-12-16 | Stop reason: SURG

## 2021-12-16 RX ORDER — OXYCODONE HYDROCHLORIDE AND ACETAMINOPHEN 5; 325 MG/1; MG/1
1 TABLET ORAL ONCE AS NEEDED
Status: COMPLETED | OUTPATIENT
Start: 2021-12-16 | End: 2021-12-16

## 2021-12-16 RX ORDER — FAMOTIDINE 10 MG/ML
INJECTION, SOLUTION INTRAVENOUS AS NEEDED
Status: DISCONTINUED | OUTPATIENT
Start: 2021-12-16 | End: 2021-12-16 | Stop reason: SURG

## 2021-12-16 RX ORDER — PROPOFOL 10 MG/ML
VIAL (ML) INTRAVENOUS AS NEEDED
Status: DISCONTINUED | OUTPATIENT
Start: 2021-12-16 | End: 2021-12-16 | Stop reason: SURG

## 2021-12-16 RX ORDER — SODIUM CHLORIDE, SODIUM LACTATE, POTASSIUM CHLORIDE, CALCIUM CHLORIDE 600; 310; 30; 20 MG/100ML; MG/100ML; MG/100ML; MG/100ML
125 INJECTION, SOLUTION INTRAVENOUS ONCE
Status: COMPLETED | OUTPATIENT
Start: 2021-12-16 | End: 2021-12-16

## 2021-12-16 RX ORDER — SODIUM CHLORIDE, SODIUM LACTATE, POTASSIUM CHLORIDE, CALCIUM CHLORIDE 600; 310; 30; 20 MG/100ML; MG/100ML; MG/100ML; MG/100ML
INJECTION, SOLUTION INTRAVENOUS CONTINUOUS PRN
Status: DISCONTINUED | OUTPATIENT
Start: 2021-12-16 | End: 2021-12-16 | Stop reason: SURG

## 2021-12-16 RX ORDER — HYDROCODONE BITARTRATE AND ACETAMINOPHEN 5; 325 MG/1; MG/1
1 TABLET ORAL EVERY 4 HOURS PRN
Status: DISCONTINUED | OUTPATIENT
Start: 2021-12-16 | End: 2021-12-16 | Stop reason: HOSPADM

## 2021-12-16 RX ORDER — DROPERIDOL 2.5 MG/ML
0.62 INJECTION, SOLUTION INTRAMUSCULAR; INTRAVENOUS ONCE AS NEEDED
Status: COMPLETED | OUTPATIENT
Start: 2021-12-16 | End: 2021-12-16

## 2021-12-16 RX ORDER — MIDAZOLAM HYDROCHLORIDE 1 MG/ML
1 INJECTION INTRAMUSCULAR; INTRAVENOUS
Status: DISCONTINUED | OUTPATIENT
Start: 2021-12-16 | End: 2021-12-16 | Stop reason: HOSPADM

## 2021-12-16 RX ORDER — SODIUM CHLORIDE 0.9 % (FLUSH) 0.9 %
10 SYRINGE (ML) INJECTION EVERY 12 HOURS SCHEDULED
Status: DISCONTINUED | OUTPATIENT
Start: 2021-12-16 | End: 2021-12-16 | Stop reason: HOSPADM

## 2021-12-16 RX ORDER — LIDOCAINE HYDROCHLORIDE 20 MG/ML
INJECTION, SOLUTION INFILTRATION; PERINEURAL AS NEEDED
Status: DISCONTINUED | OUTPATIENT
Start: 2021-12-16 | End: 2021-12-16 | Stop reason: SURG

## 2021-12-16 RX ORDER — ONDANSETRON 2 MG/ML
INJECTION INTRAMUSCULAR; INTRAVENOUS AS NEEDED
Status: DISCONTINUED | OUTPATIENT
Start: 2021-12-16 | End: 2021-12-16 | Stop reason: SURG

## 2021-12-16 RX ORDER — SODIUM CHLORIDE, SODIUM LACTATE, POTASSIUM CHLORIDE, CALCIUM CHLORIDE 600; 310; 30; 20 MG/100ML; MG/100ML; MG/100ML; MG/100ML
100 INJECTION, SOLUTION INTRAVENOUS ONCE AS NEEDED
Status: DISCONTINUED | OUTPATIENT
Start: 2021-12-16 | End: 2021-12-16 | Stop reason: HOSPADM

## 2021-12-16 RX ORDER — HYDRALAZINE HYDROCHLORIDE 20 MG/ML
5 INJECTION INTRAMUSCULAR; INTRAVENOUS 4 TIMES DAILY PRN
Status: DISCONTINUED | OUTPATIENT
Start: 2021-12-16 | End: 2021-12-16 | Stop reason: HOSPADM

## 2021-12-16 RX ORDER — MEPERIDINE HYDROCHLORIDE 25 MG/ML
12.5 INJECTION INTRAMUSCULAR; INTRAVENOUS; SUBCUTANEOUS
Status: DISCONTINUED | OUTPATIENT
Start: 2021-12-16 | End: 2021-12-16 | Stop reason: HOSPADM

## 2021-12-16 RX ORDER — IPRATROPIUM BROMIDE AND ALBUTEROL SULFATE 2.5; .5 MG/3ML; MG/3ML
3 SOLUTION RESPIRATORY (INHALATION) ONCE AS NEEDED
Status: DISCONTINUED | OUTPATIENT
Start: 2021-12-16 | End: 2021-12-16 | Stop reason: HOSPADM

## 2021-12-16 RX ORDER — FENTANYL CITRATE 50 UG/ML
50 INJECTION, SOLUTION INTRAMUSCULAR; INTRAVENOUS
Status: DISCONTINUED | OUTPATIENT
Start: 2021-12-16 | End: 2021-12-16 | Stop reason: HOSPADM

## 2021-12-16 RX ORDER — NEOSTIGMINE METHYLSULFATE 1 MG/ML
INJECTION, SOLUTION INTRAVENOUS AS NEEDED
Status: DISCONTINUED | OUTPATIENT
Start: 2021-12-16 | End: 2021-12-16 | Stop reason: SURG

## 2021-12-16 RX ORDER — MAGNESIUM HYDROXIDE 1200 MG/15ML
LIQUID ORAL AS NEEDED
Status: DISCONTINUED | OUTPATIENT
Start: 2021-12-16 | End: 2021-12-16 | Stop reason: HOSPADM

## 2021-12-16 RX ORDER — ONDANSETRON 2 MG/ML
4 INJECTION INTRAMUSCULAR; INTRAVENOUS AS NEEDED
Status: DISCONTINUED | OUTPATIENT
Start: 2021-12-16 | End: 2021-12-16 | Stop reason: HOSPADM

## 2021-12-16 RX ORDER — HYDROCODONE BITARTRATE AND ACETAMINOPHEN 5; 325 MG/1; MG/1
1 TABLET ORAL EVERY 6 HOURS PRN
Qty: 5 TABLET | Refills: 0 | Status: SHIPPED | OUTPATIENT
Start: 2021-12-16 | End: 2021-12-26

## 2021-12-16 RX ORDER — SODIUM CHLORIDE 0.9 % (FLUSH) 0.9 %
10 SYRINGE (ML) INJECTION AS NEEDED
Status: DISCONTINUED | OUTPATIENT
Start: 2021-12-16 | End: 2021-12-16 | Stop reason: HOSPADM

## 2021-12-16 RX ORDER — GLYCOPYRROLATE 0.2 MG/ML
INJECTION INTRAMUSCULAR; INTRAVENOUS AS NEEDED
Status: DISCONTINUED | OUTPATIENT
Start: 2021-12-16 | End: 2021-12-16 | Stop reason: SURG

## 2021-12-16 RX ORDER — BUPIVACAINE HYDROCHLORIDE AND EPINEPHRINE 2.5; 5 MG/ML; UG/ML
INJECTION, SOLUTION INFILTRATION; PERINEURAL AS NEEDED
Status: DISCONTINUED | OUTPATIENT
Start: 2021-12-16 | End: 2021-12-16 | Stop reason: HOSPADM

## 2021-12-16 RX ORDER — SODIUM CHLORIDE 0.9 % (FLUSH) 0.9 %
10 SYRINGE (ML) INJECTION AS NEEDED
Status: DISCONTINUED | OUTPATIENT
Start: 2021-12-16 | End: 2021-12-17 | Stop reason: HOSPADM

## 2021-12-16 RX ORDER — VECURONIUM BROMIDE 1 MG/ML
INJECTION, POWDER, LYOPHILIZED, FOR SOLUTION INTRAVENOUS AS NEEDED
Status: DISCONTINUED | OUTPATIENT
Start: 2021-12-16 | End: 2021-12-16 | Stop reason: SURG

## 2021-12-16 RX ADMIN — LIDOCAINE HYDROCHLORIDE 20 MG: 20 INJECTION, SOLUTION INFILTRATION; PERINEURAL at 08:38

## 2021-12-16 RX ADMIN — GLYCOPYRROLATE 0.4 MG: 0.2 INJECTION, SOLUTION INTRAMUSCULAR; INTRAVENOUS at 08:55

## 2021-12-16 RX ADMIN — DROPERIDOL 0.62 MG: 2.5 INJECTION, SOLUTION INTRAMUSCULAR; INTRAVENOUS at 10:36

## 2021-12-16 RX ADMIN — FAMOTIDINE 20 MG: 10 INJECTION INTRAVENOUS at 08:32

## 2021-12-16 RX ADMIN — NEOSTIGMINE 2 MG: 1 INJECTION INTRAVENOUS at 09:04

## 2021-12-16 RX ADMIN — HYDRALAZINE HYDROCHLORIDE 5 MG: 20 INJECTION INTRAMUSCULAR; INTRAVENOUS at 09:35

## 2021-12-16 RX ADMIN — SODIUM CHLORIDE, POTASSIUM CHLORIDE, SODIUM LACTATE AND CALCIUM CHLORIDE 125 ML/HR: 600; 310; 30; 20 INJECTION, SOLUTION INTRAVENOUS at 08:27

## 2021-12-16 RX ADMIN — OXYCODONE HYDROCHLORIDE AND ACETAMINOPHEN 1 TABLET: 5; 325 TABLET ORAL at 10:03

## 2021-12-16 RX ADMIN — FENTANYL CITRATE 100 MCG: 50 INJECTION INTRAMUSCULAR; INTRAVENOUS at 08:32

## 2021-12-16 RX ADMIN — PROPOFOL 80 MG: 10 INJECTION, EMULSION INTRAVENOUS at 08:38

## 2021-12-16 RX ADMIN — NEOSTIGMINE 2.5 MG: 1 INJECTION INTRAVENOUS at 08:55

## 2021-12-16 RX ADMIN — VECURONIUM BROMIDE 4 MG: 1 INJECTION, POWDER, LYOPHILIZED, FOR SOLUTION INTRAVENOUS at 08:38

## 2021-12-16 RX ADMIN — ONDANSETRON 4 MG: 2 INJECTION INTRAMUSCULAR; INTRAVENOUS at 08:32

## 2021-12-16 RX ADMIN — SUGAMMADEX 200 MG: 100 INJECTION, SOLUTION INTRAVENOUS at 09:11

## 2021-12-16 RX ADMIN — SODIUM CHLORIDE, POTASSIUM CHLORIDE, SODIUM LACTATE AND CALCIUM CHLORIDE: 600; 310; 30; 20 INJECTION, SOLUTION INTRAVENOUS at 08:32

## 2021-12-16 NOTE — ANESTHESIA POSTPROCEDURE EVALUATION
Patient: Reny Elena    Procedure Summary     Date: 12/16/21 Room / Location: [facility] OR  /  [facility] OR    Anesthesia Start: 0832 Anesthesia Stop: 0908    Procedure: INSERTION PERITONEAL DIALYSIS CATHETER LAPAROSCOPIC (N/A Abdomen) Diagnosis:       Chronic kidney disease, unspecified CKD stage      (Chronic kidney disease, unspecified CKD stage [N18.9])    Surgeons: Edy Glover MD Provider: Jasvir Haywood DO    Anesthesia Type: general ASA Status: 3          Anesthesia Type: general    Vitals  Vitals Value Taken Time   /78 12/16/21 0950   Temp 97.6 °F (36.4 °C) 12/16/21 0940   Pulse 75 12/16/21 0950   Resp 15 12/16/21 0950   SpO2 96 % 12/16/21 0950           Post Anesthesia Care and Evaluation    Patient location during evaluation: PACU  Patient participation: complete - patient participated  Level of consciousness: awake and alert  Pain score: 1  Pain management: adequate  Airway patency: patent  Anesthetic complications: No anesthetic complications  PONV Status: controlled  Cardiovascular status: acceptable  Respiratory status: acceptable and room air  Hydration status: acceptable  No anesthesia care post op

## 2021-12-16 NOTE — ANESTHESIA PREPROCEDURE EVALUATION
Anesthesia Evaluation     Patient summary reviewed and Nursing notes reviewed   no history of anesthetic complications:  NPO Solid Status: > 8 hours  NPO Liquid Status: > 8 hours           Airway   Mallampati: II  TM distance: >3 FB  Neck ROM: full  Dental - normal exam     Pulmonary - negative pulmonary ROS and normal exam   Cardiovascular     ECG reviewed  Rhythm: regular  Rate: normal    (+) hypertension well controlled, CHF , PVD, hyperlipidemia,       Neuro/Psych  (+) CVA, psychiatric history Depression,     GI/Hepatic/Renal/Endo    (+)  GERD,  renal disease ESRD and dialysis, diabetes mellitus type 2 poorly controlled using insulin,     Musculoskeletal     Abdominal  - normal exam   Substance History - negative use     OB/GYN negative ob/gyn ROS         Other   arthritis,      ROS/Med Hx Other: · The left ventricular cavity is small in size.  · Estimated left ventricular EF = 60% Left ventricular systolic function is normal.  · Left ventricular wall thickness is consistent with mild to moderate concentric hypertrophy.  · Left ventricular diastolic function is consistent with (grade I) impaired relaxation.  · Estimated right ventricular systolic pressure from tricuspid regurgitation is normal (<35 mmHg).  · The valvular regurgitations noticed in the study are within normal limits                         Anesthesia Plan    ASA 3     general     intravenous induction     Anesthetic plan, all risks, benefits, and alternatives have been provided, discussed and informed consent has been obtained with: patient.

## 2022-01-09 ENCOUNTER — HOSPITAL ENCOUNTER (INPATIENT)
Facility: HOSPITAL | Age: 62
LOS: 4 days | Discharge: HOME | End: 2022-01-14
Attending: STUDENT IN AN ORGANIZED HEALTH CARE EDUCATION/TRAINING PROGRAM | Admitting: STUDENT IN AN ORGANIZED HEALTH CARE EDUCATION/TRAINING PROGRAM

## 2022-01-09 DIAGNOSIS — R51.9 NONINTRACTABLE HEADACHE, UNSPECIFIED CHRONICITY PATTERN, UNSPECIFIED HEADACHE TYPE: ICD-10-CM

## 2022-01-09 DIAGNOSIS — E87.5 HYPERKALEMIA: Primary | ICD-10-CM

## 2022-01-09 DIAGNOSIS — I21.4 NSTEMI (NON-ST ELEVATED MYOCARDIAL INFARCTION): ICD-10-CM

## 2022-01-09 DIAGNOSIS — I16.0 HYPERTENSIVE URGENCY: ICD-10-CM

## 2022-01-09 LAB
ALBUMIN SERPL-MCNC: 4.02 G/DL (ref 3.5–5.2)
ALBUMIN/GLOB SERPL: 1.4 G/DL
ALP SERPL-CCNC: 98 U/L (ref 39–117)
ALT SERPL W P-5'-P-CCNC: 10 U/L (ref 1–33)
AMYLASE SERPL-CCNC: 46 U/L (ref 28–100)
ANION GAP SERPL CALCULATED.3IONS-SCNC: 13 MMOL/L (ref 5–15)
AST SERPL-CCNC: 12 U/L (ref 1–32)
BASOPHILS # BLD AUTO: 0.04 10*3/MM3 (ref 0–0.2)
BASOPHILS NFR BLD AUTO: 0.7 % (ref 0–1.5)
BILIRUB SERPL-MCNC: 0.2 MG/DL (ref 0–1.2)
BUN SERPL-MCNC: 67 MG/DL (ref 8–23)
BUN/CREAT SERPL: 14.1 (ref 7–25)
CALCIUM SPEC-SCNC: 8.7 MG/DL (ref 8.6–10.5)
CHLORIDE SERPL-SCNC: 105 MMOL/L (ref 98–107)
CO2 SERPL-SCNC: 17 MMOL/L (ref 22–29)
CREAT SERPL-MCNC: 4.75 MG/DL (ref 0.57–1)
CRP SERPL-MCNC: <0.3 MG/DL (ref 0–0.5)
DEPRECATED RDW RBC AUTO: 45.9 FL (ref 37–54)
EOSINOPHIL # BLD AUTO: 0.19 10*3/MM3 (ref 0–0.4)
EOSINOPHIL NFR BLD AUTO: 3.3 % (ref 0.3–6.2)
ERYTHROCYTE [DISTWIDTH] IN BLOOD BY AUTOMATED COUNT: 13.2 % (ref 12.3–15.4)
FLUAV RNA RESP QL NAA+PROBE: NOT DETECTED
FLUBV RNA RESP QL NAA+PROBE: NOT DETECTED
GFR SERPL CREATININE-BSD FRML MDRD: 9 ML/MIN/1.73
GFR SERPL CREATININE-BSD FRML MDRD: ABNORMAL ML/MIN/{1.73_M2}
GLOBULIN UR ELPH-MCNC: 2.9 GM/DL
GLUCOSE BLDC GLUCOMTR-MCNC: 220 MG/DL (ref 70–130)
GLUCOSE BLDC GLUCOMTR-MCNC: 267 MG/DL (ref 70–130)
GLUCOSE SERPL-MCNC: 307 MG/DL (ref 65–99)
HCT VFR BLD AUTO: 29.1 % (ref 34–46.6)
HGB BLD-MCNC: 9.1 G/DL (ref 12–15.9)
HOLD SPECIMEN: NORMAL
HOLD SPECIMEN: NORMAL
IMM GRANULOCYTES # BLD AUTO: 0.01 10*3/MM3 (ref 0–0.05)
IMM GRANULOCYTES NFR BLD AUTO: 0.2 % (ref 0–0.5)
LIPASE SERPL-CCNC: 32 U/L (ref 13–60)
LYMPHOCYTES # BLD AUTO: 1.07 10*3/MM3 (ref 0.7–3.1)
LYMPHOCYTES NFR BLD AUTO: 18.4 % (ref 19.6–45.3)
MAGNESIUM SERPL-MCNC: 2 MG/DL (ref 1.6–2.4)
MCH RBC QN AUTO: 29.8 PG (ref 26.6–33)
MCHC RBC AUTO-ENTMCNC: 31.3 G/DL (ref 31.5–35.7)
MCV RBC AUTO: 95.4 FL (ref 79–97)
MONOCYTES # BLD AUTO: 0.21 10*3/MM3 (ref 0.1–0.9)
MONOCYTES NFR BLD AUTO: 3.6 % (ref 5–12)
NEUTROPHILS NFR BLD AUTO: 4.3 10*3/MM3 (ref 1.7–7)
NEUTROPHILS NFR BLD AUTO: 73.8 % (ref 42.7–76)
NRBC BLD AUTO-RTO: 0 /100 WBC (ref 0–0.2)
PLATELET # BLD AUTO: 181 10*3/MM3 (ref 140–450)
PMV BLD AUTO: 10.2 FL (ref 6–12)
POTASSIUM SERPL-SCNC: 6.2 MMOL/L (ref 3.5–5.2)
PROT SERPL-MCNC: 6.9 G/DL (ref 6–8.5)
RBC # BLD AUTO: 3.05 10*6/MM3 (ref 3.77–5.28)
SARS-COV-2 RNA RESP QL NAA+PROBE: NOT DETECTED
SODIUM SERPL-SCNC: 135 MMOL/L (ref 136–145)
T4 FREE SERPL-MCNC: 1.03 NG/DL (ref 0.93–1.7)
TROPONIN T SERPL-MCNC: 0.04 NG/ML (ref 0–0.03)
TROPONIN T SERPL-MCNC: 0.04 NG/ML (ref 0–0.03)
TSH SERPL DL<=0.05 MIU/L-ACNC: 6.1 UIU/ML (ref 0.27–4.2)
WBC NRBC COR # BLD: 5.82 10*3/MM3 (ref 3.4–10.8)
WHOLE BLOOD HOLD SPECIMEN: NORMAL
WHOLE BLOOD HOLD SPECIMEN: NORMAL

## 2022-01-09 PROCEDURE — 25010000002 ONDANSETRON PER 1 MG: Performed by: STUDENT IN AN ORGANIZED HEALTH CARE EDUCATION/TRAINING PROGRAM

## 2022-01-09 PROCEDURE — 82150 ASSAY OF AMYLASE: CPT | Performed by: PHYSICIAN ASSISTANT

## 2022-01-09 PROCEDURE — 93005 ELECTROCARDIOGRAM TRACING: CPT | Performed by: PHYSICIAN ASSISTANT

## 2022-01-09 PROCEDURE — 83735 ASSAY OF MAGNESIUM: CPT | Performed by: PHYSICIAN ASSISTANT

## 2022-01-09 PROCEDURE — 83690 ASSAY OF LIPASE: CPT | Performed by: PHYSICIAN ASSISTANT

## 2022-01-09 PROCEDURE — 94640 AIRWAY INHALATION TREATMENT: CPT

## 2022-01-09 PROCEDURE — 80050 GENERAL HEALTH PANEL: CPT | Performed by: PHYSICIAN ASSISTANT

## 2022-01-09 PROCEDURE — 82962 GLUCOSE BLOOD TEST: CPT

## 2022-01-09 PROCEDURE — 86140 C-REACTIVE PROTEIN: CPT | Performed by: PHYSICIAN ASSISTANT

## 2022-01-09 PROCEDURE — 87636 SARSCOV2 & INF A&B AMP PRB: CPT | Performed by: PHYSICIAN ASSISTANT

## 2022-01-09 PROCEDURE — 84439 ASSAY OF FREE THYROXINE: CPT | Performed by: STUDENT IN AN ORGANIZED HEALTH CARE EDUCATION/TRAINING PROGRAM

## 2022-01-09 PROCEDURE — 63710000001 INSULIN REGULAR HUMAN PER 5 UNITS: Performed by: STUDENT IN AN ORGANIZED HEALTH CARE EDUCATION/TRAINING PROGRAM

## 2022-01-09 PROCEDURE — 93010 ELECTROCARDIOGRAM REPORT: CPT | Performed by: INTERNAL MEDICINE

## 2022-01-09 PROCEDURE — 84484 ASSAY OF TROPONIN QUANT: CPT | Performed by: PHYSICIAN ASSISTANT

## 2022-01-09 PROCEDURE — 94799 UNLISTED PULMONARY SVC/PX: CPT

## 2022-01-09 PROCEDURE — 99285 EMERGENCY DEPT VISIT HI MDM: CPT

## 2022-01-09 RX ORDER — HYDROCODONE BITARTRATE AND ACETAMINOPHEN 10; 325 MG/1; MG/1
1 TABLET ORAL ONCE
Status: COMPLETED | OUTPATIENT
Start: 2022-01-09 | End: 2022-01-09

## 2022-01-09 RX ORDER — ALBUTEROL SULFATE 2.5 MG/3ML
2.5 SOLUTION RESPIRATORY (INHALATION) ONCE
Status: COMPLETED | OUTPATIENT
Start: 2022-01-09 | End: 2022-01-09

## 2022-01-09 RX ORDER — LACTULOSE 10 G/15ML
20 SOLUTION ORAL 3 TIMES DAILY
Status: DISCONTINUED | OUTPATIENT
Start: 2022-01-09 | End: 2022-01-10

## 2022-01-09 RX ORDER — ONDANSETRON 2 MG/ML
4 INJECTION INTRAMUSCULAR; INTRAVENOUS ONCE
Status: COMPLETED | OUTPATIENT
Start: 2022-01-09 | End: 2022-01-09

## 2022-01-09 RX ORDER — ASPIRIN 81 MG/1
324 TABLET, CHEWABLE ORAL ONCE
Status: COMPLETED | OUTPATIENT
Start: 2022-01-09 | End: 2022-01-09

## 2022-01-09 RX ORDER — LABETALOL HYDROCHLORIDE 5 MG/ML
10 INJECTION, SOLUTION INTRAVENOUS ONCE
Status: DISCONTINUED | OUTPATIENT
Start: 2022-01-09 | End: 2022-01-09

## 2022-01-09 RX ORDER — LABETALOL HYDROCHLORIDE 5 MG/ML
20 INJECTION, SOLUTION INTRAVENOUS ONCE
Status: COMPLETED | OUTPATIENT
Start: 2022-01-09 | End: 2022-01-09

## 2022-01-09 RX ADMIN — SODIUM ZIRCONIUM CYCLOSILICATE 10 G: 10 POWDER, FOR SUSPENSION ORAL at 20:12

## 2022-01-09 RX ADMIN — LABETALOL HYDROCHLORIDE 20 MG: 5 INJECTION INTRAVENOUS at 20:11

## 2022-01-09 RX ADMIN — HYDROCODONE BITARTRATE AND ACETAMINOPHEN 1 TABLET: 10; 325 TABLET ORAL at 21:14

## 2022-01-09 RX ADMIN — ALBUTEROL SULFATE 2.5 MG: 2.5 SOLUTION RESPIRATORY (INHALATION) at 19:43

## 2022-01-09 RX ADMIN — LABETALOL HYDROCHLORIDE 20 MG: 5 INJECTION INTRAVENOUS at 21:14

## 2022-01-09 RX ADMIN — ONDANSETRON 4 MG: 2 INJECTION INTRAMUSCULAR; INTRAVENOUS at 21:38

## 2022-01-09 RX ADMIN — ASPIRIN 324 MG: 81 TABLET, CHEWABLE ORAL at 21:14

## 2022-01-09 RX ADMIN — HUMAN INSULIN 5 UNITS: 100 INJECTION, SOLUTION SUBCUTANEOUS at 20:11

## 2022-01-09 RX ADMIN — LACTULOSE 20 G: 20 SOLUTION ORAL at 21:38

## 2022-01-09 NOTE — ED NOTES
MEDICAL SCREENING:    Reason for Visit: hx of renal failure, worsening hypertension x 2 days, now having vomiting and generalized weakness, last took BP meds at 10:30 am today.    Patient initially seen in triage.  The patient was advised further evaluation and diagnostic testing will be needed, some of the treatment and testing will be initiated in the lobby in order to begin the process.  The patient will be returned to the waiting area for the time being and possibly be re-assessed by a subsequent ED provider.  The patient will be brought back to the treatment area in as timely manner as possible.       Anu Bennett PA  01/09/22 [id number]

## 2022-01-10 PROBLEM — E87.5 HYPERKALEMIA: Status: ACTIVE | Noted: 2022-01-10

## 2022-01-10 LAB
ALBUMIN SERPL-MCNC: 3.88 G/DL (ref 3.5–5.2)
ALBUMIN/GLOB SERPL: 1.5 G/DL
ALP SERPL-CCNC: 82 U/L (ref 39–117)
ALT SERPL W P-5'-P-CCNC: 10 U/L (ref 1–33)
ANION GAP SERPL CALCULATED.3IONS-SCNC: 12.3 MMOL/L (ref 5–15)
ANION GAP SERPL CALCULATED.3IONS-SCNC: 12.4 MMOL/L (ref 5–15)
ANION GAP SERPL CALCULATED.3IONS-SCNC: 14.1 MMOL/L (ref 5–15)
ANION GAP SERPL CALCULATED.3IONS-SCNC: 14.4 MMOL/L (ref 5–15)
AST SERPL-CCNC: 11 U/L (ref 1–32)
BASOPHILS # BLD AUTO: 0.06 10*3/MM3 (ref 0–0.2)
BASOPHILS NFR BLD AUTO: 0.9 % (ref 0–1.5)
BILIRUB SERPL-MCNC: 0.2 MG/DL (ref 0–1.2)
BUN SERPL-MCNC: 65 MG/DL (ref 8–23)
BUN SERPL-MCNC: 67 MG/DL (ref 8–23)
BUN SERPL-MCNC: 69 MG/DL (ref 8–23)
BUN SERPL-MCNC: 70 MG/DL (ref 8–23)
BUN/CREAT SERPL: 13.1 (ref 7–25)
BUN/CREAT SERPL: 13.2 (ref 7–25)
BUN/CREAT SERPL: 13.8 (ref 7–25)
BUN/CREAT SERPL: 13.9 (ref 7–25)
CALCIUM SPEC-SCNC: 8.3 MG/DL (ref 8.6–10.5)
CALCIUM SPEC-SCNC: 8.5 MG/DL (ref 8.6–10.5)
CALCIUM SPEC-SCNC: 8.9 MG/DL (ref 8.6–10.5)
CALCIUM SPEC-SCNC: 9.1 MG/DL (ref 8.6–10.5)
CHLORIDE SERPL-SCNC: 106 MMOL/L (ref 98–107)
CHLORIDE SERPL-SCNC: 106 MMOL/L (ref 98–107)
CHLORIDE SERPL-SCNC: 108 MMOL/L (ref 98–107)
CHLORIDE SERPL-SCNC: 108 MMOL/L (ref 98–107)
CO2 SERPL-SCNC: 14.9 MMOL/L (ref 22–29)
CO2 SERPL-SCNC: 15.6 MMOL/L (ref 22–29)
CO2 SERPL-SCNC: 17.6 MMOL/L (ref 22–29)
CO2 SERPL-SCNC: 17.7 MMOL/L (ref 22–29)
CREAT SERPL-MCNC: 4.95 MG/DL (ref 0.57–1)
CREAT SERPL-MCNC: 4.97 MG/DL (ref 0.57–1)
CREAT SERPL-MCNC: 5.06 MG/DL (ref 0.57–1)
CREAT SERPL-MCNC: 5.09 MG/DL (ref 0.57–1)
CRP SERPL-MCNC: <0.3 MG/DL (ref 0–0.5)
DEPRECATED RDW RBC AUTO: 48.1 FL (ref 37–54)
EOSINOPHIL # BLD AUTO: 0.24 10*3/MM3 (ref 0–0.4)
EOSINOPHIL NFR BLD AUTO: 3.8 % (ref 0.3–6.2)
ERYTHROCYTE [DISTWIDTH] IN BLOOD BY AUTOMATED COUNT: 13.3 % (ref 12.3–15.4)
ERYTHROCYTE [SEDIMENTATION RATE] IN BLOOD: 23 MM/HR (ref 0–30)
GFR SERPL CREATININE-BSD FRML MDRD: 9 ML/MIN/1.73
GFR SERPL CREATININE-BSD FRML MDRD: ABNORMAL ML/MIN/{1.73_M2}
GLOBULIN UR ELPH-MCNC: 2.6 GM/DL
GLUCOSE BLDC GLUCOMTR-MCNC: 252 MG/DL (ref 70–130)
GLUCOSE BLDC GLUCOMTR-MCNC: 266 MG/DL (ref 70–130)
GLUCOSE BLDC GLUCOMTR-MCNC: 341 MG/DL (ref 70–130)
GLUCOSE SERPL-MCNC: 216 MG/DL (ref 65–99)
GLUCOSE SERPL-MCNC: 220 MG/DL (ref 65–99)
GLUCOSE SERPL-MCNC: 244 MG/DL (ref 65–99)
GLUCOSE SERPL-MCNC: 288 MG/DL (ref 65–99)
HCT VFR BLD AUTO: 27.4 % (ref 34–46.6)
HGB BLD-MCNC: 8.5 G/DL (ref 12–15.9)
IMM GRANULOCYTES # BLD AUTO: 0.02 10*3/MM3 (ref 0–0.05)
IMM GRANULOCYTES NFR BLD AUTO: 0.3 % (ref 0–0.5)
LYMPHOCYTES # BLD AUTO: 1.9 10*3/MM3 (ref 0.7–3.1)
LYMPHOCYTES NFR BLD AUTO: 30.1 % (ref 19.6–45.3)
MCH RBC QN AUTO: 30.4 PG (ref 26.6–33)
MCHC RBC AUTO-ENTMCNC: 31 G/DL (ref 31.5–35.7)
MCV RBC AUTO: 97.9 FL (ref 79–97)
MONOCYTES # BLD AUTO: 0.41 10*3/MM3 (ref 0.1–0.9)
MONOCYTES NFR BLD AUTO: 6.5 % (ref 5–12)
NEUTROPHILS NFR BLD AUTO: 3.69 10*3/MM3 (ref 1.7–7)
NEUTROPHILS NFR BLD AUTO: 58.4 % (ref 42.7–76)
NRBC BLD AUTO-RTO: 0 /100 WBC (ref 0–0.2)
PLATELET # BLD AUTO: 170 10*3/MM3 (ref 140–450)
PMV BLD AUTO: 10.2 FL (ref 6–12)
POTASSIUM SERPL-SCNC: 5.2 MMOL/L (ref 3.5–5.2)
POTASSIUM SERPL-SCNC: 5.5 MMOL/L (ref 3.5–5.2)
POTASSIUM SERPL-SCNC: 5.8 MMOL/L (ref 3.5–5.2)
POTASSIUM SERPL-SCNC: 6.2 MMOL/L (ref 3.5–5.2)
PROT SERPL-MCNC: 6.5 G/DL (ref 6–8.5)
QT INTERVAL: 390 MS
QT INTERVAL: 412 MS
QTC INTERVAL: 453 MS
QTC INTERVAL: 460 MS
RBC # BLD AUTO: 2.8 10*6/MM3 (ref 3.77–5.28)
SODIUM SERPL-SCNC: 136 MMOL/L (ref 136–145)
SODIUM SERPL-SCNC: 136 MMOL/L (ref 136–145)
SODIUM SERPL-SCNC: 137 MMOL/L (ref 136–145)
SODIUM SERPL-SCNC: 138 MMOL/L (ref 136–145)
TROPONIN T SERPL-MCNC: 0.05 NG/ML (ref 0–0.03)
WBC NRBC COR # BLD: 6.32 10*3/MM3 (ref 3.4–10.8)

## 2022-01-10 PROCEDURE — 86140 C-REACTIVE PROTEIN: CPT | Performed by: EMERGENCY MEDICINE

## 2022-01-10 PROCEDURE — 82962 GLUCOSE BLOOD TEST: CPT

## 2022-01-10 PROCEDURE — 63710000001 INSULIN ASPART PER 5 UNITS: Performed by: STUDENT IN AN ORGANIZED HEALTH CARE EDUCATION/TRAINING PROGRAM

## 2022-01-10 PROCEDURE — 93010 ELECTROCARDIOGRAM REPORT: CPT | Performed by: INTERNAL MEDICINE

## 2022-01-10 PROCEDURE — 85652 RBC SED RATE AUTOMATED: CPT | Performed by: EMERGENCY MEDICINE

## 2022-01-10 PROCEDURE — 84484 ASSAY OF TROPONIN QUANT: CPT | Performed by: EMERGENCY MEDICINE

## 2022-01-10 PROCEDURE — 93005 ELECTROCARDIOGRAM TRACING: CPT | Performed by: EMERGENCY MEDICINE

## 2022-01-10 PROCEDURE — 63710000001 INSULIN DETEMIR PER 5 UNITS: Performed by: EMERGENCY MEDICINE

## 2022-01-10 PROCEDURE — 99223 1ST HOSP IP/OBS HIGH 75: CPT | Performed by: STUDENT IN AN ORGANIZED HEALTH CARE EDUCATION/TRAINING PROGRAM

## 2022-01-10 PROCEDURE — 63710000001 INSULIN REGULAR HUMAN PER 5 UNITS: Performed by: STUDENT IN AN ORGANIZED HEALTH CARE EDUCATION/TRAINING PROGRAM

## 2022-01-10 PROCEDURE — 85025 COMPLETE CBC W/AUTO DIFF WBC: CPT | Performed by: EMERGENCY MEDICINE

## 2022-01-10 PROCEDURE — 94799 UNLISTED PULMONARY SVC/PX: CPT

## 2022-01-10 PROCEDURE — 25010000002 HEPARIN (PORCINE) PER 1000 UNITS: Performed by: STUDENT IN AN ORGANIZED HEALTH CARE EDUCATION/TRAINING PROGRAM

## 2022-01-10 PROCEDURE — 80053 COMPREHEN METABOLIC PANEL: CPT | Performed by: STUDENT IN AN ORGANIZED HEALTH CARE EDUCATION/TRAINING PROGRAM

## 2022-01-10 PROCEDURE — 36415 COLL VENOUS BLD VENIPUNCTURE: CPT

## 2022-01-10 PROCEDURE — 25010000002 CALCIUM GLUCONATE-NACL 1-0.675 GM/50ML-% SOLUTION: Performed by: STUDENT IN AN ORGANIZED HEALTH CARE EDUCATION/TRAINING PROGRAM

## 2022-01-10 RX ORDER — ISOSORBIDE MONONITRATE 30 MG/1
30 TABLET, EXTENDED RELEASE ORAL DAILY
Status: DISCONTINUED | OUTPATIENT
Start: 2022-01-11 | End: 2022-01-14 | Stop reason: HOSPADM

## 2022-01-10 RX ORDER — HYDRALAZINE HYDROCHLORIDE 50 MG/1
50 TABLET, FILM COATED ORAL EVERY 12 HOURS SCHEDULED
Status: DISCONTINUED | OUTPATIENT
Start: 2022-01-10 | End: 2022-01-11

## 2022-01-10 RX ORDER — INSULIN GLARGINE 100 [IU]/ML
10 INJECTION, SOLUTION SUBCUTANEOUS 2 TIMES DAILY
Status: ON HOLD | COMMUNITY
End: 2022-01-14 | Stop reason: SDUPTHER

## 2022-01-10 RX ORDER — HYDROCODONE BITARTRATE AND ACETAMINOPHEN 10; 325 MG/1; MG/1
1 TABLET ORAL EVERY 8 HOURS PRN
Status: CANCELLED | OUTPATIENT
Start: 2022-01-10

## 2022-01-10 RX ORDER — HEPARIN SODIUM 5000 [USP'U]/ML
5000 INJECTION, SOLUTION INTRAVENOUS; SUBCUTANEOUS EVERY 8 HOURS SCHEDULED
Status: DISCONTINUED | OUTPATIENT
Start: 2022-01-10 | End: 2022-01-14 | Stop reason: HOSPADM

## 2022-01-10 RX ORDER — ROPINIROLE 0.25 MG/1
0.5 TABLET, FILM COATED ORAL 2 TIMES DAILY
Status: DISCONTINUED | OUTPATIENT
Start: 2022-01-10 | End: 2022-01-14 | Stop reason: HOSPADM

## 2022-01-10 RX ORDER — CARVEDILOL 6.25 MG/1
12.5 TABLET ORAL 2 TIMES DAILY WITH MEALS
Status: CANCELLED | OUTPATIENT
Start: 2022-01-10

## 2022-01-10 RX ORDER — HYDRALAZINE HYDROCHLORIDE 25 MG/1
50 TABLET, FILM COATED ORAL ONCE
Status: COMPLETED | OUTPATIENT
Start: 2022-01-10 | End: 2022-01-10

## 2022-01-10 RX ORDER — TRAZODONE HYDROCHLORIDE 50 MG/1
100 TABLET ORAL NIGHTLY
Status: DISCONTINUED | OUTPATIENT
Start: 2022-01-10 | End: 2022-01-14 | Stop reason: HOSPADM

## 2022-01-10 RX ORDER — HYDROXYZINE HYDROCHLORIDE 25 MG/1
25 TABLET, FILM COATED ORAL 3 TIMES DAILY PRN
Status: DISCONTINUED | OUTPATIENT
Start: 2022-01-10 | End: 2022-01-14 | Stop reason: HOSPADM

## 2022-01-10 RX ORDER — HYDROXYZINE HYDROCHLORIDE 25 MG/1
25 TABLET, FILM COATED ORAL 3 TIMES DAILY PRN
Status: CANCELLED | OUTPATIENT
Start: 2022-01-10

## 2022-01-10 RX ORDER — PRAVASTATIN SODIUM 40 MG
20 TABLET ORAL DAILY
Status: DISCONTINUED | OUTPATIENT
Start: 2022-01-10 | End: 2022-01-14 | Stop reason: HOSPADM

## 2022-01-10 RX ORDER — CALCITRIOL 0.25 UG/1
0.25 CAPSULE, LIQUID FILLED ORAL DAILY
Status: DISCONTINUED | OUTPATIENT
Start: 2022-01-10 | End: 2022-01-14 | Stop reason: HOSPADM

## 2022-01-10 RX ORDER — CETIRIZINE HYDROCHLORIDE 10 MG/1
5 TABLET ORAL DAILY
Status: DISCONTINUED | OUTPATIENT
Start: 2022-01-10 | End: 2022-01-14 | Stop reason: HOSPADM

## 2022-01-10 RX ORDER — PANTOPRAZOLE SODIUM 40 MG/1
40 TABLET, DELAYED RELEASE ORAL DAILY
Status: CANCELLED | OUTPATIENT
Start: 2022-01-10

## 2022-01-10 RX ORDER — LACTULOSE 10 G/15ML
20 SOLUTION ORAL 3 TIMES DAILY
Status: DISCONTINUED | OUTPATIENT
Start: 2022-01-10 | End: 2022-01-14 | Stop reason: HOSPADM

## 2022-01-10 RX ORDER — DEXTROSE MONOHYDRATE 25 G/50ML
25 INJECTION, SOLUTION INTRAVENOUS
Status: DISCONTINUED | OUTPATIENT
Start: 2022-01-10 | End: 2022-01-14 | Stop reason: HOSPADM

## 2022-01-10 RX ORDER — GABAPENTIN 300 MG/1
600 CAPSULE ORAL ONCE
Status: COMPLETED | OUTPATIENT
Start: 2022-01-10 | End: 2022-01-10

## 2022-01-10 RX ORDER — CLONIDINE HYDROCHLORIDE 0.1 MG/1
0.1 TABLET ORAL 2 TIMES DAILY
Status: DISCONTINUED | OUTPATIENT
Start: 2022-01-10 | End: 2022-01-10

## 2022-01-10 RX ORDER — CARVEDILOL 6.25 MG/1
12.5 TABLET ORAL 2 TIMES DAILY WITH MEALS
Status: DISCONTINUED | OUTPATIENT
Start: 2022-01-10 | End: 2022-01-14 | Stop reason: HOSPADM

## 2022-01-10 RX ORDER — HYDROCODONE BITARTRATE AND ACETAMINOPHEN 10; 325 MG/1; MG/1
1 TABLET ORAL ONCE AS NEEDED
Status: COMPLETED | OUTPATIENT
Start: 2022-01-10 | End: 2022-01-10

## 2022-01-10 RX ORDER — GABAPENTIN 300 MG/1
300 CAPSULE ORAL NIGHTLY
Status: DISCONTINUED | OUTPATIENT
Start: 2022-01-10 | End: 2022-01-14 | Stop reason: HOSPADM

## 2022-01-10 RX ORDER — PANTOPRAZOLE SODIUM 40 MG/1
40 TABLET, DELAYED RELEASE ORAL DAILY
Status: DISCONTINUED | OUTPATIENT
Start: 2022-01-10 | End: 2022-01-14 | Stop reason: HOSPADM

## 2022-01-10 RX ORDER — ISOSORBIDE MONONITRATE 30 MG/1
30 TABLET, EXTENDED RELEASE ORAL ONCE
Status: COMPLETED | OUTPATIENT
Start: 2022-01-10 | End: 2022-01-10

## 2022-01-10 RX ORDER — FLUTICASONE PROPIONATE 50 MCG
2 SPRAY, SUSPENSION (ML) NASAL DAILY PRN
Status: CANCELLED | OUTPATIENT
Start: 2022-01-10

## 2022-01-10 RX ORDER — CLONIDINE HYDROCHLORIDE 0.1 MG/1
0.1 TABLET ORAL 2 TIMES DAILY
Status: DISCONTINUED | OUTPATIENT
Start: 2022-01-10 | End: 2022-01-14 | Stop reason: HOSPADM

## 2022-01-10 RX ORDER — ALBUTEROL SULFATE 2.5 MG/3ML
2.5 SOLUTION RESPIRATORY (INHALATION) ONCE
Status: COMPLETED | OUTPATIENT
Start: 2022-01-10 | End: 2022-01-10

## 2022-01-10 RX ORDER — ROPINIROLE 0.25 MG/1
0.5 TABLET, FILM COATED ORAL 2 TIMES DAILY
Status: CANCELLED | OUTPATIENT
Start: 2022-01-10

## 2022-01-10 RX ORDER — FLUTICASONE PROPIONATE 50 MCG
2 SPRAY, SUSPENSION (ML) NASAL DAILY PRN
Status: DISCONTINUED | OUTPATIENT
Start: 2022-01-10 | End: 2022-01-14 | Stop reason: HOSPADM

## 2022-01-10 RX ORDER — CLOPIDOGREL BISULFATE 75 MG/1
75 TABLET ORAL DAILY
Status: CANCELLED | OUTPATIENT
Start: 2022-01-10

## 2022-01-10 RX ORDER — CLOPIDOGREL BISULFATE 75 MG/1
75 TABLET ORAL DAILY
Status: DISCONTINUED | OUTPATIENT
Start: 2022-01-10 | End: 2022-01-14 | Stop reason: HOSPADM

## 2022-01-10 RX ORDER — HYDRALAZINE HYDROCHLORIDE 25 MG/1
50 TABLET, FILM COATED ORAL EVERY 12 HOURS
Status: CANCELLED | OUTPATIENT
Start: 2022-01-10

## 2022-01-10 RX ORDER — CLONIDINE HYDROCHLORIDE 0.1 MG/1
0.1 TABLET ORAL ONCE
Status: COMPLETED | OUTPATIENT
Start: 2022-01-10 | End: 2022-01-10

## 2022-01-10 RX ORDER — SODIUM CHLORIDE 9 MG/ML
75 INJECTION, SOLUTION INTRAVENOUS CONTINUOUS
Status: DISCONTINUED | OUTPATIENT
Start: 2022-01-10 | End: 2022-01-12

## 2022-01-10 RX ORDER — NICOTINE POLACRILEX 4 MG
15 LOZENGE BUCCAL
Status: DISCONTINUED | OUTPATIENT
Start: 2022-01-10 | End: 2022-01-14 | Stop reason: HOSPADM

## 2022-01-10 RX ORDER — CALCIUM ACETATE 667 MG/1
1334 CAPSULE ORAL
Status: ON HOLD | COMMUNITY
End: 2022-05-12

## 2022-01-10 RX ORDER — TIZANIDINE 4 MG/1
4 TABLET ORAL EVERY 6 HOURS PRN
Status: CANCELLED | OUTPATIENT
Start: 2022-01-10

## 2022-01-10 RX ORDER — HYDRALAZINE HYDROCHLORIDE 50 MG/1
50 TABLET, FILM COATED ORAL EVERY 12 HOURS
COMMUNITY
End: 2022-01-14 | Stop reason: HOSPADM

## 2022-01-10 RX ORDER — ISOSORBIDE MONONITRATE 30 MG/1
30 TABLET, EXTENDED RELEASE ORAL DAILY
Status: CANCELLED | OUTPATIENT
Start: 2022-01-10

## 2022-01-10 RX ORDER — HYDROXYZINE HYDROCHLORIDE 25 MG/1
25 TABLET, FILM COATED ORAL ONCE
Status: COMPLETED | OUTPATIENT
Start: 2022-01-10 | End: 2022-01-10

## 2022-01-10 RX ORDER — GABAPENTIN 300 MG/1
300 CAPSULE ORAL NIGHTLY
Status: CANCELLED | OUTPATIENT
Start: 2022-01-10

## 2022-01-10 RX ORDER — CARVEDILOL 6.25 MG/1
12.5 TABLET ORAL ONCE
Status: COMPLETED | OUTPATIENT
Start: 2022-01-10 | End: 2022-01-10

## 2022-01-10 RX ORDER — CLONIDINE HYDROCHLORIDE 0.1 MG/1
0.1 TABLET ORAL 2 TIMES DAILY
Status: CANCELLED | OUTPATIENT
Start: 2022-01-10

## 2022-01-10 RX ORDER — PRAVASTATIN SODIUM 40 MG
20 TABLET ORAL DAILY
Status: CANCELLED | OUTPATIENT
Start: 2022-01-10

## 2022-01-10 RX ORDER — CETIRIZINE HYDROCHLORIDE 10 MG/1
10 TABLET ORAL DAILY
Status: CANCELLED | OUTPATIENT
Start: 2022-01-10

## 2022-01-10 RX ORDER — SODIUM CHLORIDE 0.9 % (FLUSH) 0.9 %
10 SYRINGE (ML) INJECTION AS NEEDED
Status: DISCONTINUED | OUTPATIENT
Start: 2022-01-10 | End: 2022-01-14 | Stop reason: HOSPADM

## 2022-01-10 RX ORDER — TRAZODONE HYDROCHLORIDE 50 MG/1
100 TABLET ORAL NIGHTLY
Status: CANCELLED | OUTPATIENT
Start: 2022-01-10

## 2022-01-10 RX ORDER — SODIUM CHLORIDE 0.9 % (FLUSH) 0.9 %
10 SYRINGE (ML) INJECTION EVERY 12 HOURS SCHEDULED
Status: DISCONTINUED | OUTPATIENT
Start: 2022-01-10 | End: 2022-01-14 | Stop reason: HOSPADM

## 2022-01-10 RX ORDER — HYDROCODONE BITARTRATE AND ACETAMINOPHEN 10; 325 MG/1; MG/1
1 TABLET ORAL EVERY 8 HOURS PRN
Status: DISCONTINUED | OUTPATIENT
Start: 2022-01-10 | End: 2022-01-14 | Stop reason: HOSPADM

## 2022-01-10 RX ORDER — CALCIUM ACETATE 667 MG/1
1334 CAPSULE ORAL
Status: CANCELLED | OUTPATIENT
Start: 2022-01-10

## 2022-01-10 RX ORDER — CALCITRIOL 0.25 UG/1
0.25 CAPSULE, LIQUID FILLED ORAL DAILY
Status: CANCELLED | OUTPATIENT
Start: 2022-01-10

## 2022-01-10 RX ORDER — LEVOTHYROXINE SODIUM 0.03 MG/1
25 TABLET ORAL DAILY
Status: CANCELLED | OUTPATIENT
Start: 2022-01-10

## 2022-01-10 RX ORDER — LEVOTHYROXINE SODIUM 0.03 MG/1
25 TABLET ORAL DAILY
Status: DISCONTINUED | OUTPATIENT
Start: 2022-01-10 | End: 2022-01-14 | Stop reason: HOSPADM

## 2022-01-10 RX ORDER — CALCITRIOL 0.25 UG/1
0.25 CAPSULE, LIQUID FILLED ORAL DAILY
Status: ON HOLD | COMMUNITY
End: 2022-07-02

## 2022-01-10 RX ORDER — ISOSORBIDE MONONITRATE 30 MG/1
30 TABLET, EXTENDED RELEASE ORAL DAILY
Status: ON HOLD | COMMUNITY
End: 2022-05-19 | Stop reason: SDUPTHER

## 2022-01-10 RX ORDER — ACETAMINOPHEN 325 MG/1
650 TABLET ORAL EVERY 6 HOURS PRN
Status: DISCONTINUED | OUTPATIENT
Start: 2022-01-10 | End: 2022-01-14 | Stop reason: HOSPADM

## 2022-01-10 RX ORDER — CALCIUM GLUCONATE 20 MG/ML
1 INJECTION, SOLUTION INTRAVENOUS
Status: COMPLETED | OUTPATIENT
Start: 2022-01-10 | End: 2022-01-10

## 2022-01-10 RX ORDER — CALCIUM ACETATE 667 MG/1
1334 CAPSULE ORAL
Status: DISCONTINUED | OUTPATIENT
Start: 2022-01-10 | End: 2022-01-14 | Stop reason: HOSPADM

## 2022-01-10 RX ADMIN — GABAPENTIN 300 MG: 300 CAPSULE ORAL at 21:06

## 2022-01-10 RX ADMIN — CLOPIDOGREL 75 MG: 75 TABLET, FILM COATED ORAL at 11:30

## 2022-01-10 RX ADMIN — PRAVASTATIN SODIUM 20 MG: 40 TABLET ORAL at 11:31

## 2022-01-10 RX ADMIN — CLONIDINE HYDROCHLORIDE 0.1 MG: 0.1 TABLET ORAL at 09:47

## 2022-01-10 RX ADMIN — PANTOPRAZOLE SODIUM 40 MG: 40 TABLET, DELAYED RELEASE ORAL at 21:02

## 2022-01-10 RX ADMIN — CALCIUM GLUCONATE 1 G: 20 INJECTION, SOLUTION INTRAVENOUS at 02:11

## 2022-01-10 RX ADMIN — HYDROCODONE BITARTRATE AND ACETAMINOPHEN 1 TABLET: 10; 325 TABLET ORAL at 10:34

## 2022-01-10 RX ADMIN — SODIUM ZIRCONIUM CYCLOSILICATE 10 G: 10 POWDER, FOR SUSPENSION ORAL at 16:36

## 2022-01-10 RX ADMIN — LACTULOSE 20 G: 10 SOLUTION ORAL at 09:06

## 2022-01-10 RX ADMIN — ALBUTEROL SULFATE 2.5 MG: 2.5 SOLUTION RESPIRATORY (INHALATION) at 01:54

## 2022-01-10 RX ADMIN — SODIUM ZIRCONIUM CYCLOSILICATE 10 G: 10 POWDER, FOR SUSPENSION ORAL at 02:11

## 2022-01-10 RX ADMIN — GABAPENTIN 600 MG: 300 CAPSULE ORAL at 10:34

## 2022-01-10 RX ADMIN — CARVEDILOL 12.5 MG: 6.25 TABLET, FILM COATED ORAL at 17:33

## 2022-01-10 RX ADMIN — CARVEDILOL 12.5 MG: 6.25 TABLET, FILM COATED ORAL at 10:31

## 2022-01-10 RX ADMIN — LACTULOSE 20 G: 10 SOLUTION ORAL at 21:02

## 2022-01-10 RX ADMIN — TRAZODONE HYDROCHLORIDE 100 MG: 50 TABLET ORAL at 21:03

## 2022-01-10 RX ADMIN — HYDRALAZINE HYDROCHLORIDE 50 MG: 50 TABLET ORAL at 21:03

## 2022-01-10 RX ADMIN — SODIUM ZIRCONIUM CYCLOSILICATE 10 G: 10 POWDER, FOR SUSPENSION ORAL at 21:03

## 2022-01-10 RX ADMIN — CALCIUM ACETATE 1334 MG: 667 CAPSULE ORAL at 21:02

## 2022-01-10 RX ADMIN — CETIRIZINE HYDROCHLORIDE 5 MG: 10 TABLET, FILM COATED ORAL at 21:02

## 2022-01-10 RX ADMIN — HEPARIN SODIUM 5000 UNITS: 5000 INJECTION INTRAVENOUS; SUBCUTANEOUS at 21:02

## 2022-01-10 RX ADMIN — LACTULOSE 20 G: 10 SOLUTION ORAL at 16:35

## 2022-01-10 RX ADMIN — ACETAMINOPHEN 650 MG: 325 TABLET ORAL at 07:59

## 2022-01-10 RX ADMIN — LACTULOSE 20 G: 10 SOLUTION ORAL at 02:11

## 2022-01-10 RX ADMIN — LEVOTHYROXINE SODIUM 25 MCG: 25 TABLET ORAL at 11:29

## 2022-01-10 RX ADMIN — CLONIDINE HYDROCHLORIDE 0.1 MG: 0.1 TABLET ORAL at 21:26

## 2022-01-10 RX ADMIN — INSULIN DETEMIR 10 UNITS: 100 INJECTION, SOLUTION SUBCUTANEOUS at 11:46

## 2022-01-10 RX ADMIN — CALCIUM GLUCONATE 1 G: 20 INJECTION, SOLUTION INTRAVENOUS at 03:11

## 2022-01-10 RX ADMIN — HYDROXYZINE 25 MG: 25 TABLET, FILM COATED ORAL at 02:12

## 2022-01-10 RX ADMIN — INSULIN ASPART 5 UNITS: 100 INJECTION, SOLUTION INTRAVENOUS; SUBCUTANEOUS at 21:12

## 2022-01-10 RX ADMIN — ROPINIROLE HYDROCHLORIDE 0.5 MG: 0.25 TABLET, FILM COATED ORAL at 21:03

## 2022-01-10 RX ADMIN — SODIUM CHLORIDE 125 ML/HR: 9 INJECTION, SOLUTION INTRAVENOUS at 10:30

## 2022-01-10 RX ADMIN — HUMAN INSULIN 5 UNITS: 100 INJECTION, SOLUTION SUBCUTANEOUS at 02:11

## 2022-01-10 RX ADMIN — INSULIN DETEMIR 10 UNITS: 100 INJECTION, SOLUTION SUBCUTANEOUS at 23:46

## 2022-01-10 RX ADMIN — CALCITRIOL 0.25 MCG: 0.25 CAPSULE, LIQUID FILLED ORAL at 21:02

## 2022-01-10 RX ADMIN — SODIUM CHLORIDE 500 ML: 9 INJECTION, SOLUTION INTRAVENOUS at 10:30

## 2022-01-10 RX ADMIN — HYDRALAZINE HYDROCHLORIDE 50 MG: 25 TABLET, FILM COATED ORAL at 09:47

## 2022-01-10 RX ADMIN — SODIUM ZIRCONIUM CYCLOSILICATE 10 G: 10 POWDER, FOR SUSPENSION ORAL at 09:07

## 2022-01-10 RX ADMIN — ISOSORBIDE MONONITRATE 30 MG: 30 TABLET, EXTENDED RELEASE ORAL at 10:31

## 2022-01-10 NOTE — PROGRESS NOTES
Discharge Planning Assessment   [place]     Patient Name: Reny Elena  MRN: [id number]  Today's Date: 1/10/2022    Admit Date: 1/9/2022     Discharge Needs Assessment     Row Name 01/10/22 1845       Living Environment    Lives With significant other    Current Living Arrangements home/apartment/condo    Primary Care Provided by self    Quality of Family Relationships unable to assess    Able to Return to Prior Arrangements yes       Resource/Environmental Concerns    Resource/Environmental Concerns none       Transition Planning    Patient/Family Anticipates Transition to home with family    Transportation Anticipated family or friend will provide       Discharge Needs Assessment    Readmission Within the Last 30 Days no previous admission in last 30 days    Equipment Currently Used at Home walker, rolling; cane, straight; glucometer    Concerns to be Addressed discharge planning    Anticipated Changes Related to Illness none    Equipment Needed After Discharge none               Discharge Plan     Row Name 01/10/22 1846       Plan    Plan Pt lives at home with significant other and plans to return home upon discharge, family to provide transportation. Pcp is Susan Stephens, she uses [facility] and  has [facility]. Pt uses glucometer, bsc, straight cane and rolling walker. Pt is to start peritoneal dialysis soon.              Continued Care and Services - Admitted Since 1/9/2022    Coordination has not been started for this encounter.          Demographic Summary     Row Name 01/10/22 1845       General Information    Admission Type inpatient    Arrived From home    Referral Source emergency department    Reason for Consult discharge planning               Functional Status    No documentation.                Psychosocial    No documentation.                Abuse/Neglect    No documentation.                Legal    No documentation.                Substance Abuse    No documentation.                 Patient Forms    No documentation.                   Reny Chung RN

## 2022-01-10 NOTE — ED PROVIDER NOTES
[facility]  emergency department encounter        Pt Name: Reny Elena  MRN: [id number]  Birthdate 1960  Date of evaluation: 1/12/2022    Chief Complaint   Patient presents with   • Hypertension   • Chronic Renal Failure             HISTORY OF PRESENT ILLNESS:   Reny Elena is a 61 y.o. female PMH HTN, HLD, PAD, DM, GERD, hypothyroid, iron deficiency anemia, arthritis, ESRD.  Still makes urine.  Patient had peritoneal dialysis catheter placed 12/16/2021.  She is scheduled to start training for peritoneal dialysis in the next week.  She has never been dialyzed.  Nephrologist Dr. Sandy.    Patient presents with a predominant concern regarding her blood pressure.  Blood pressure has been elevated to 230s over 130s.  Arrival blood pressure 233/115, she endorses nausea over the past few days that she believes is secondary to anxiety.  Also endorses headache ongoing since yesterday.  No associated photophobia nuchal rigidity.  Patient additionally endorses lack of p.o. intake and no stooling over the past couple of days.  She has no abdominal pain and is passing gas.    Denies fever chills cough congestion runny nose chest pain shortness of breath melena vomiting diarrhea dysuria rash myalgias.    No other exacerbating or alleviating factors other than as noted above.  Severity: Severe    PCP: Susan Stephens APRN          REVIEW OF SYSTEMS:     Review of Systems   Constitutional: Positive for appetite change. Negative for fever.   HENT: Negative for congestion and rhinorrhea.    Eyes: Negative for visual disturbance.   Respiratory: Negative for cough and shortness of breath.    Cardiovascular: Negative for chest pain.   Gastrointestinal: Positive for nausea. Negative for abdominal pain and vomiting.   Genitourinary: Negative for dysuria.   Musculoskeletal: Negative for myalgias.   Skin: Negative for rash.   Neurological: Positive for headaches.   Psychiatric/Behavioral: Negative for confusion.        Review of systems otherwise as per HPI.          PREVIOUS HISTORY:         Past Medical History:   Diagnosis Date   • Arthritis    • Closed fracture of right fibula and tibia 2018   • Depression    • Diabetic ulcer of left foot associated with type 2 diabetes mellitus ([facility]) 2017   • Diastolic dysfunction    • Disease of thyroid gland    • Elevated cholesterol    • End stage renal disease ([facility])    • Essential hypertension    • GERD (gastroesophageal reflux disease)    • History of transfusion    • Hyperlipidemia    • Iron deficiency anemia 2018   • PAD (peripheral artery disease) ([facility])    • Renal failure    • Type 2 diabetes mellitus with hyperglycemia, with long-term current use of insulin ([facility]) 2018           Past Surgical History:   Procedure Laterality Date   • ABDOMINAL SURGERY     • BACK SURGERY      Post spinal diskectomy, osteophytectomy in one lumbar interspace   • CATARACT EXTRACTION      Left    •  SECTION     • DENTAL PROCEDURE     • ENDOSCOPY     • FRACTURE SURGERY      right leg   • HYSTERECTOMY     • INCISION AND DRAINAGE LEG Left 4/15/2017    Procedure: INCISION AND DRAINAGE LOWER EXTREMITY;  Surgeon: Basilio Morris MD;  Location: [facility] OR;  Service:    • INCISION AND DRAINAGE LEG Left 2017    Procedure: Irrigation and Debridment abcess diabetic wound left foot with deep culture;  Surgeon: Basilio Morris MD;  Location: [facility] OR;  Service:    • INSERTION PERITONEAL DIALYSIS CATHETER N/A 2021    Procedure: INSERTION PERITONEAL DIALYSIS CATHETER LAPAROSCOPIC;  Surgeon: Edy Glover MD;  Location: [facility] OR;  Service: General;  Laterality: N/A;   • KNEE ARTHROSCOPY Right    • ORIF TIBIA FRACTURE Right 2018    Procedure: TIBIAL  FRACTURE OPEN REDUCTION INTERNAL FIXATION;  Surgeon: Jung Barragan MD;  Location: [facility] OR;  Service: Orthopedics   • TOE AMPUTATION Right     5th   • TUBAL ABDOMINAL LIGATION             Social History      Socioeconomic History   • Marital status:    Tobacco Use   • Smoking status: Never Smoker   • Smokeless tobacco: Never Used   Vaping Use   • Vaping Use: Never used   Substance and Sexual Activity   • Alcohol use: No   • Drug use: No   • Sexual activity: Defer           Family History   Problem Relation Age of Onset   • Heart disease Mother    • Cancer Mother    • COPD Mother    • Diabetes Other    • Depression Other          Current Outpatient Medications   Medication Instructions   • calcitriol (ROCALTROL) 0.25 mcg, Oral, Daily   • calcium acetate (PHOS BINDER)) 1,334 mg, Oral, 3 Times Daily With Meals   • carvedilol (COREG) 12.5 mg, Oral, 2 Times Daily With Meals   • cetirizine (ZYRTEC) 10 mg, Oral, Daily   • cloNIDine (CATAPRES) 0.1 mg, Oral, 2 Times Daily   • clopidogrel (PLAVIX) 75 mg, Oral, Daily   • fluticasone (FLONASE) 50 MCG/ACT nasal spray 2 sprays, Nasal, Daily PRN   • gabapentin (NEURONTIN) 300 mg, Oral, Nightly   • hydrALAZINE (APRESOLINE) 50 mg, Oral, Every 12 Hours   • HYDROcodone-acetaminophen (NORCO)  MG per tablet 1 tablet, Oral, Every 8 Hours PRN, Pharmacy directions states every 4 to 6 hours prn pain.    • hydrOXYzine (ATARAX) 25 mg, Oral, 3 Times Daily PRN   • insulin aspart (NOVOLOG) 3 Units, Subcutaneous, 3 Times Daily Before Meals   • insulin glargine (LANTUS, SEMGLEE) 10 Units, Subcutaneous, 2 Times Daily   • isosorbide mononitrate (IMDUR) 30 mg, Oral, Daily   • levothyroxine (SYNTHROID, LEVOTHROID) 25 mcg, Oral, Daily   • pantoprazole (PROTONIX) 40 mg, Oral, Daily   • pravastatin (PRAVACHOL) 20 mg, Oral, Daily   • rOPINIRole (REQUIP) 0.5 mg, Oral, 2 Times Daily   • tiZANidine (ZANAFLEX) 4 mg, Oral, Every 6 Hours PRN   • traZODone (DESYREL) 100 mg, Oral, Nightly         Allergies:  Penicillins, Codeine, and Sulfa antibiotics    Medications, allergies and past medical, surgical, family, and social history reviewed.        PHYSICAL EXAM:     Physical Exam  Vitals and nursing  note reviewed.   Constitutional:       General: She is not in acute distress.     Appearance: Normal appearance. She is not ill-appearing or toxic-appearing.      Comments: Well-appearing   HENT:      Head: Normocephalic and atraumatic.   Eyes:      Extraocular Movements: Extraocular movements intact.      Conjunctiva/sclera: Conjunctivae normal.   Cardiovascular:      Rate and Rhythm: Normal rate and regular rhythm.   Pulmonary:      Effort: Pulmonary effort is normal. No respiratory distress.   Abdominal:      General: Abdomen is flat. There is no distension.      Palpations: Abdomen is soft.      Tenderness: There is no abdominal tenderness.      Comments: Peritoneal dialysis catheter right upper quadrant, no surrounding erythema or tenderness   Musculoskeletal:         General: No deformity. Normal range of motion.      Cervical back: Normal range of motion. No rigidity.   Neurological:      General: No focal deficit present.      Mental Status: She is alert and oriented to person, place, and time.   Psychiatric:         Mood and Affect: Mood normal.         Behavior: Behavior normal.             COMPLETED DIAGNOSTIC STUDIES AND INTERVENTIONS:     Lab Results (last 24 hours)     Procedure Component Value Units Date/Time    POC Glucose Once [[id number]]  (Abnormal) Collected: 01/11/22 2058    Specimen: Blood Updated: 01/11/22 2104     Glucose 230 mg/dL      Comment: Meter: [id number] : [id number] Morgan Collett       POC Glucose Once [[id number]]  (Abnormal) Collected: 01/11/22 2358    Specimen: Blood Updated: 01/12/22 0004     Glucose 239 mg/dL      Comment: RN Notified Meter: [id number] : [id number] HEMANT WILLIAMSON       CBC & Differential [[id number]]  (Abnormal) Collected: 01/12/22 0025    Specimen: Blood Updated: 01/12/22 0207    Narrative:      The following orders were created for panel order CBC & Differential.  Procedure                               Abnormality         Status                      ---------                               -----------         ------                     CBC Auto Differential[[id number]]        Abnormal            Final result                 Please view results for these tests on the individual orders.    Basic Metabolic Panel [[id number]]  (Abnormal) Collected: 01/12/22 0025    Specimen: Blood Updated: 01/12/22 0224     Glucose 230 mg/dL      BUN 58 mg/dL      Creatinine 4.60 mg/dL      Sodium 133 mmol/L      Potassium 4.7 mmol/L      Chloride 104 mmol/L      CO2 17.9 mmol/L      Calcium 7.7 mg/dL      eGFR   Amer --     Comment: <15 Indicative of kidney failure.        eGFR Non African Amer 10 mL/min/1.73      Comment: <15 Indicative of kidney failure.        BUN/Creatinine Ratio 12.6     Anion Gap 11.1 mmol/L     Narrative:      GFR Normal >60  Chronic Kidney Disease <60  Kidney Failure <15      CBC Auto Differential [[id number]]  (Abnormal) Collected: 01/12/22 0025    Specimen: Blood Updated: 01/12/22 0207     WBC 3.87 10*3/mm3      RBC 2.41 10*6/mm3      Hemoglobin 7.3 g/dL      Hematocrit 23.3 %      MCV 96.7 fL      MCH 30.3 pg      MCHC 31.3 g/dL      RDW 15.1 %      RDW-SD 53.5 fl      MPV 10.2 fL      Platelets 101 10*3/mm3      Neutrophil % 55.2 %      Lymphocyte % 30.5 %      Monocyte % 7.5 %      Eosinophil % 5.7 %      Basophil % 0.8 %      Immature Grans % 0.3 %      Neutrophils, Absolute 2.14 10*3/mm3      Lymphocytes, Absolute 1.18 10*3/mm3      Monocytes, Absolute 0.29 10*3/mm3      Eosinophils, Absolute 0.22 10*3/mm3      Basophils, Absolute 0.03 10*3/mm3      Immature Grans, Absolute 0.01 10*3/mm3      nRBC 0.0 /100 WBC     Iron Profile [[id number]]  (Abnormal) Collected: 01/12/22 0025    Specimen: Blood Updated: 01/12/22 0957     Iron 121 mcg/dL      Iron Saturation 60 %      Transferrin 135 mg/dL      TIBC 201 mcg/dL     POC Glucose Once [[id number]]  (Abnormal) Collected: 01/12/22 0635    Specimen: Blood Updated: 01/12/22 0642     Glucose 183 mg/dL       Comment: Meter: [id number] : [id number] ALY ROSALES       POC Glucose Once [[id number]]  (Normal) Collected: 01/12/22 1027    Specimen: Blood Updated: 01/12/22 1034     Glucose 111 mg/dL      Comment: Meter: [id number] : [id number] ALY ROSALES       Hemoglobin & Hematocrit, Blood [[id number]]  (Abnormal) Collected: 01/12/22 1146    Specimen: Blood Updated: 01/12/22 1159     Hemoglobin 7.2 g/dL      Hematocrit 23.3 %     POC Glucose Once [[id number]]  (Abnormal) Collected: 01/12/22 1602    Specimen: Blood Updated: 01/12/22 1609     Glucose 202 mg/dL      Comment: Meter: [id number] : [id number] ALY ROSALES       POC Glucose Once [[id number]]  (Abnormal) Collected: 01/12/22 1830    Specimen: Blood Updated: 01/12/22 1836     Glucose 183 mg/dL      Comment: RN Notified Meter: [id number] : [id number] stephen santo               No orders to display         New Medications Ordered This Visit   Medications   • insulin regular (humuLIN R,novoLIN R) injection 5 Units   • albuterol (PROVENTIL) nebulizer solution 0.083% 2.5 mg/3mL   • labetalol (NORMODYNE,TRANDATE) injection 20 mg   • aspirin chewable tablet 324 mg   • labetalol (NORMODYNE,TRANDATE) injection 20 mg   • HYDROcodone-acetaminophen (NORCO)  MG per tablet 1 tablet   • ondansetron (ZOFRAN) injection 4 mg   • hydrOXYzine (ATARAX) tablet 25 mg   • insulin regular (humuLIN R,novoLIN R) injection 5 Units   • albuterol (PROVENTIL) nebulizer solution 0.083% 2.5 mg/3mL   • calcium gluconate 1g/50ml 0.675% NaCl IV SOLN   • lactulose (CHRONULAC) 10 GM/15ML solution 20 g   • sodium zirconium cyclosilicate (LOKELMA) pack 10 g   • acetaminophen (TYLENOL) tablet 650 mg   • cloNIDine (CATAPRES) tablet 0.1 mg   • hydrALAZINE (APRESOLINE) tablet 50 mg   • isosorbide mononitrate (IMDUR) 24 hr tablet 30 mg   • carvedilol (COREG) tablet 12.5 mg   • sodium chloride 0.9 % bolus 500 mL   • HYDROcodone-acetaminophen (NORCO)  MG per tablet 1 tablet    • gabapentin (NEURONTIN) capsule 600 mg   • insulin detemir (LEVEMIR) injection 10 Units   • carvedilol (COREG) tablet 12.5 mg   • clopidogrel (PLAVIX) tablet 75 mg   • isosorbide mononitrate (IMDUR) 24 hr tablet 30 mg   • levothyroxine (SYNTHROID, LEVOTHROID) tablet 25 mcg   • pravastatin (PRAVACHOL) tablet 20 mg   • sodium chloride 0.9 % flush 10 mL   • sodium chloride 0.9 % flush 10 mL   • heparin (porcine) 5000 UNIT/ML injection 5,000 Units   • cloNIDine (CATAPRES) tablet 0.1 mg   • calcitriol (ROCALTROL) capsule 0.25 mcg   • calcium acetate (PHOS BINDER)) capsule 1,334 mg   • cetirizine (zyrTEC) tablet 5 mg   • fluticasone (FLONASE) 50 MCG/ACT nasal spray 2 spray   • gabapentin (NEURONTIN) capsule 300 mg   • HYDROcodone-acetaminophen (NORCO)  MG per tablet 1 tablet   • hydrOXYzine (ATARAX) tablet 25 mg   • traZODone (DESYREL) tablet 100 mg   • rOPINIRole (REQUIP) tablet 0.5 mg   • pantoprazole (PROTONIX) EC tablet 40 mg   • dextrose (GLUTOSE) oral gel 15 g   • dextrose (D50W) (25 g/50 mL) IV injection 25 g   • glucagon (human recombinant) (GLUCAGEN DIAGNOSTIC) injection 1 mg   • insulin aspart (novoLOG) injection 0-7 Units   • ondansetron (ZOFRAN) injection 4 mg   • hydrALAZINE (APRESOLINE) tablet 25 mg   • insulin detemir (LEVEMIR) injection 15 Units         Procedures            MEDICAL DECISION-MAKING AND ED COURSE:     ED Course as of 01/12/22 1939   Sun Jan 09, 2022 1943 EKG at 1859 hrs. NSR 81 bpm, , QRS 84, QTc 453, regular axis, no significant ST deviation or T wave or monitor concerning for acute ischemia. [KP]   2120 MDM: 61-year-old female PMH ESRD not yet starting dialysis.  Presents with significant hypertension 233/115, mildly elevated troponin, possible new baseline versus hypertensive emergency and NSTEMI.  Creatinine roughly stable.  Hyperkalemic, potassium 6.2, treated with insulin, albuterol nebulizer, Lokelma.  Will trend hourly glucose and every 4 hour BMP.  Patient has mild  headache at this time, complaining of left leg pain requesting her home dose hydrocodone 10 mg.  Guarding hypertension treated with 20 mg of labetalol IV, blood pressure dropped to 127 systolic and has risen back up to 225.  Administer second dose of 20 mg labetalol, goal blood pressure systolic 170-180.  Plan to start Cardene if goal not achieved.  Discussed case with nephrology Dr. Pedroza he who recommended add on lactulose since patient has not been stooling. [KP]   2125 Free T4: 1.03 [KP]   2138 /88 [KP]   2156 Glucose(!): 220 [KP]   Mon Quan 10, 2022   0032 Glucose(!): 267 [KP]   0218 Repeat BMP with potassium stable at 6.2.  Repeat insulin 5 units, albuterol nebulizer, Lokelma, lactulose.  Avoiding fluid bolus due to ESRD.  Administer calcium 2 g.  Patient's blood pressure currently 174/85, at goal.  Admission delayed secondary to hospital and systemwide overcrowding. [KP]   0219 Endorsed patient to Dr. Darnell at end of shift.    Electronically signed by Helder Barros MD, 01/10/22, 2:20 AM EST.     [KP]   0540 Continue aggressive medical management in the emergency department, while awaiting hospital admission/bed. [ES]   0938 I assumed patient's care from Dr. Maradiaga this morning at shift change.  Patient's blood pressure has gone back up.  She has not had her usual morning medications.  These have been ordered. [CM]   1019 Earlier patient voiced headache, requested Tylenol.  She voices that her headache is much better now, but that it is now time for her usual morning Norco and gabapentin that she takes for her chronic pain, she is requesting these medications.  We will also administer patient's morning long-acting insulin.  Blood sugar moments ago was 252.  Blood pressure has improved, currently 172/74, sinus rhythm at 75 on the monitor.  Pulse ox 94%, respirations 12.  Patient has declined breakfast. [CM]   1508 Potassium(!): 5.5  Case discussed with nephrology Dr. Arroyo, he advises at this  point continue with IV fluids.  Case discussed with hospitalist Dr. Hartley, he is admitting patient to the hospitalist service. [CM]      ED Course User Index  [CM] Yoan Santos MD  [ES] Clay Maradiaga MD  [KP] Helder Barros MD       ?      FINAL IMPRESSION:       1. Hyperkalemia    2. Hypertensive urgency    3. Nonintractable headache, unspecified chronicity pattern, unspecified headache type    4. NSTEMI (non-ST elevated myocardial infarction) (HCC)         The complaints listed here are new problems to this examiner.      FOLLOW-UP  No follow-up provider specified.    DISPOSITION  ED Disposition     ED Disposition Condition Comment    Decision to Admit  Level of Care: Telemetry [5]   Diagnosis: Hyperkalemia [269400]   Certification: I Certify That Inpatient Hospital Services Are Medically Necessary For Greater Than 2 Midnights                  This care is provided during an unprecedented national emergency due to the Novel Coronavirus (COVID-19). COVID-19 infections and transmission risks place heavy strains on healthcare resources. As this pandemic evolves, the Hospital and providers strive to respond fluidly, to remain operational, and to provide care relative to available resources and information. Outcomes are unpredictable and treatments are without well-defined guidelines. Further, the impact of COVID-19 on all aspects of emergency care, including the impact to patients seeking care for reasons other than COVID-19, is unavoidable during this national emergency.    This note was dictated using a [id number] tool. Occasional wrong-word or 'sound-a-like' substitutions may have occurred due to the inherent limitations of voice recognition software. ?Read the chart carefully and recognize, using context, where substitutions have occurred.    Helder Barros MD  19:39 EST  1/12/2022             Helder Barros MD  01/12/22 1939

## 2022-01-10 NOTE — PLAN OF CARE
Goal Outcome Evaluation:   Patient resting in bed, No acute distress noted. Will continue to monitor and follow plan of care.

## 2022-01-11 LAB
A-A DO2: 14.8 MMHG (ref 0–300)
ABO GROUP BLD: NORMAL
ALBUMIN SERPL-MCNC: 2.96 G/DL (ref 3.5–5.2)
ALBUMIN/GLOB SERPL: 1.4 G/DL
ALP SERPL-CCNC: 64 U/L (ref 39–117)
ALT SERPL W P-5'-P-CCNC: 7 U/L (ref 1–33)
ANION GAP SERPL CALCULATED.3IONS-SCNC: 12 MMOL/L (ref 5–15)
ARTERIAL PATENCY WRIST A: ABNORMAL
AST SERPL-CCNC: 9 U/L (ref 1–32)
ATMOSPHERIC PRESS: 739 MMHG
BASE EXCESS BLDA CALC-SCNC: -7.2 MMOL/L (ref 0–2)
BASOPHILS # BLD AUTO: 0.02 10*3/MM3 (ref 0–0.2)
BASOPHILS NFR BLD AUTO: 0.4 % (ref 0–1.5)
BDY SITE: ABNORMAL
BILIRUB SERPL-MCNC: 0.2 MG/DL (ref 0–1.2)
BLD GP AB SCN SERPL QL: NEGATIVE
BODY TEMPERATURE: 0 C
BUN SERPL-MCNC: 65 MG/DL (ref 8–23)
BUN/CREAT SERPL: 13.3 (ref 7–25)
CALCIUM SPEC-SCNC: 7.7 MG/DL (ref 8.6–10.5)
CHLORIDE SERPL-SCNC: 109 MMOL/L (ref 98–107)
CO2 BLDA-SCNC: 21.9 MMOL/L (ref 22–33)
CO2 SERPL-SCNC: 17 MMOL/L (ref 22–29)
COHGB MFR BLD: 1.4 % (ref 0–5)
CREAT SERPL-MCNC: 4.89 MG/DL (ref 0.57–1)
DEPRECATED RDW RBC AUTO: 48.3 FL (ref 37–54)
EOSINOPHIL # BLD AUTO: 0.28 10*3/MM3 (ref 0–0.4)
EOSINOPHIL NFR BLD AUTO: 5.4 % (ref 0.3–6.2)
ERYTHROCYTE [DISTWIDTH] IN BLOOD BY AUTOMATED COUNT: 13.4 % (ref 12.3–15.4)
GFR SERPL CREATININE-BSD FRML MDRD: 9 ML/MIN/1.73
GFR SERPL CREATININE-BSD FRML MDRD: ABNORMAL ML/MIN/{1.73_M2}
GLOBULIN UR ELPH-MCNC: 2.1 GM/DL
GLUCOSE BLDC GLUCOMTR-MCNC: 162 MG/DL (ref 70–130)
GLUCOSE BLDC GLUCOMTR-MCNC: 167 MG/DL (ref 70–130)
GLUCOSE BLDC GLUCOMTR-MCNC: 177 MG/DL (ref 70–130)
GLUCOSE BLDC GLUCOMTR-MCNC: 230 MG/DL (ref 70–130)
GLUCOSE BLDC GLUCOMTR-MCNC: 241 MG/DL (ref 70–130)
GLUCOSE BLDC GLUCOMTR-MCNC: 254 MG/DL (ref 70–130)
GLUCOSE BLDC GLUCOMTR-MCNC: 297 MG/DL (ref 70–130)
GLUCOSE BLDC GLUCOMTR-MCNC: 54 MG/DL (ref 70–130)
GLUCOSE SERPL-MCNC: 111 MG/DL (ref 65–99)
HCO3 BLDA-SCNC: 20.4 MMOL/L (ref 20–26)
HCT VFR BLD AUTO: 21 % (ref 34–46.6)
HCT VFR BLD AUTO: 21.8 % (ref 34–46.6)
HCT VFR BLD AUTO: 26 % (ref 34–46.6)
HCT VFR BLD CALC: 24.8 % (ref 38–51)
HGB BLD-MCNC: 6.6 G/DL (ref 12–15.9)
HGB BLD-MCNC: 6.7 G/DL (ref 12–15.9)
HGB BLD-MCNC: 7.9 G/DL (ref 12–15.9)
HGB BLDA-MCNC: 8.1 G/DL (ref 13.5–17.5)
IMM GRANULOCYTES # BLD AUTO: 0.01 10*3/MM3 (ref 0–0.05)
IMM GRANULOCYTES NFR BLD AUTO: 0.2 % (ref 0–0.5)
INHALED O2 CONCENTRATION: 21 %
LYMPHOCYTES # BLD AUTO: 1.97 10*3/MM3 (ref 0.7–3.1)
LYMPHOCYTES NFR BLD AUTO: 38 % (ref 19.6–45.3)
Lab: ABNORMAL
Lab: ABNORMAL
MCH RBC QN AUTO: 30.7 PG (ref 26.6–33)
MCHC RBC AUTO-ENTMCNC: 31.4 G/DL (ref 31.5–35.7)
MCV RBC AUTO: 97.7 FL (ref 79–97)
METHGB BLD QL: 0.9 % (ref 0–3)
MODALITY: ABNORMAL
MONOCYTES # BLD AUTO: 0.36 10*3/MM3 (ref 0.1–0.9)
MONOCYTES NFR BLD AUTO: 6.9 % (ref 5–12)
NEUTROPHILS NFR BLD AUTO: 2.55 10*3/MM3 (ref 1.7–7)
NEUTROPHILS NFR BLD AUTO: 49.1 % (ref 42.7–76)
NOTE: ABNORMAL
NOTIFIED BY: ABNORMAL
NOTIFIED WHO: ABNORMAL
NRBC BLD AUTO-RTO: 0 /100 WBC (ref 0–0.2)
OXYHGB MFR BLDV: 92.5 % (ref 94–99)
PCO2 BLDA: 50.9 MM HG (ref 35–45)
PCO2 TEMP ADJ BLD: ABNORMAL MM[HG]
PH BLDA: 7.21 PH UNITS (ref 7.35–7.45)
PH, TEMP CORRECTED: ABNORMAL
PLATELET # BLD AUTO: 115 10*3/MM3 (ref 140–450)
PMV BLD AUTO: 10.3 FL (ref 6–12)
PO2 BLDA: 73.1 MM HG (ref 83–108)
PO2 TEMP ADJ BLD: ABNORMAL MM[HG]
POTASSIUM SERPL-SCNC: 4.6 MMOL/L (ref 3.5–5.2)
PROT SERPL-MCNC: 5.1 G/DL (ref 6–8.5)
RBC # BLD AUTO: 2.15 10*6/MM3 (ref 3.77–5.28)
RH BLD: POSITIVE
SAO2 % BLDCOA: 94.7 % (ref 94–99)
SODIUM SERPL-SCNC: 138 MMOL/L (ref 136–145)
T&S EXPIRATION DATE: NORMAL
VENTILATOR MODE: ABNORMAL
WBC NRBC COR # BLD: 5.19 10*3/MM3 (ref 3.4–10.8)

## 2022-01-11 PROCEDURE — 86923 COMPATIBILITY TEST ELECTRIC: CPT

## 2022-01-11 PROCEDURE — 63710000001 INSULIN ASPART PER 5 UNITS: Performed by: STUDENT IN AN ORGANIZED HEALTH CARE EDUCATION/TRAINING PROGRAM

## 2022-01-11 PROCEDURE — 94660 CPAP INITIATION&MGMT: CPT

## 2022-01-11 PROCEDURE — 85014 HEMATOCRIT: CPT | Performed by: STUDENT IN AN ORGANIZED HEALTH CARE EDUCATION/TRAINING PROGRAM

## 2022-01-11 PROCEDURE — 82962 GLUCOSE BLOOD TEST: CPT

## 2022-01-11 PROCEDURE — 63710000001 INSULIN DETEMIR PER 5 UNITS: Performed by: STUDENT IN AN ORGANIZED HEALTH CARE EDUCATION/TRAINING PROGRAM

## 2022-01-11 PROCEDURE — 85025 COMPLETE CBC W/AUTO DIFF WBC: CPT | Performed by: STUDENT IN AN ORGANIZED HEALTH CARE EDUCATION/TRAINING PROGRAM

## 2022-01-11 PROCEDURE — 25010000002 ONDANSETRON PER 1 MG: Performed by: STUDENT IN AN ORGANIZED HEALTH CARE EDUCATION/TRAINING PROGRAM

## 2022-01-11 PROCEDURE — 99232 SBSQ HOSP IP/OBS MODERATE 35: CPT | Performed by: STUDENT IN AN ORGANIZED HEALTH CARE EDUCATION/TRAINING PROGRAM

## 2022-01-11 PROCEDURE — 86900 BLOOD TYPING SEROLOGIC ABO: CPT

## 2022-01-11 PROCEDURE — 80053 COMPREHEN METABOLIC PANEL: CPT | Performed by: STUDENT IN AN ORGANIZED HEALTH CARE EDUCATION/TRAINING PROGRAM

## 2022-01-11 PROCEDURE — 94799 UNLISTED PULMONARY SVC/PX: CPT

## 2022-01-11 PROCEDURE — 36600 WITHDRAWAL OF ARTERIAL BLOOD: CPT

## 2022-01-11 PROCEDURE — 86901 BLOOD TYPING SEROLOGIC RH(D): CPT | Performed by: STUDENT IN AN ORGANIZED HEALTH CARE EDUCATION/TRAINING PROGRAM

## 2022-01-11 PROCEDURE — 36430 TRANSFUSION BLD/BLD COMPNT: CPT

## 2022-01-11 PROCEDURE — 86900 BLOOD TYPING SEROLOGIC ABO: CPT | Performed by: STUDENT IN AN ORGANIZED HEALTH CARE EDUCATION/TRAINING PROGRAM

## 2022-01-11 PROCEDURE — 86850 RBC ANTIBODY SCREEN: CPT | Performed by: STUDENT IN AN ORGANIZED HEALTH CARE EDUCATION/TRAINING PROGRAM

## 2022-01-11 PROCEDURE — 5A09357 ASSISTANCE WITH RESPIRATORY VENTILATION, LESS THAN 24 CONSECUTIVE HOURS, CONTINUOUS POSITIVE AIRWAY PRESSURE: ICD-10-PCS | Performed by: STUDENT IN AN ORGANIZED HEALTH CARE EDUCATION/TRAINING PROGRAM

## 2022-01-11 PROCEDURE — 85018 HEMOGLOBIN: CPT | Performed by: STUDENT IN AN ORGANIZED HEALTH CARE EDUCATION/TRAINING PROGRAM

## 2022-01-11 PROCEDURE — 82375 ASSAY CARBOXYHB QUANT: CPT

## 2022-01-11 PROCEDURE — 83050 HGB METHEMOGLOBIN QUAN: CPT

## 2022-01-11 PROCEDURE — P9016 RBC LEUKOCYTES REDUCED: HCPCS

## 2022-01-11 PROCEDURE — 82805 BLOOD GASES W/O2 SATURATION: CPT

## 2022-01-11 PROCEDURE — 25010000002 HEPARIN (PORCINE) PER 1000 UNITS: Performed by: STUDENT IN AN ORGANIZED HEALTH CARE EDUCATION/TRAINING PROGRAM

## 2022-01-11 RX ORDER — TIZANIDINE 4 MG/1
4 TABLET ORAL EVERY 6 HOURS PRN
Status: CANCELLED | OUTPATIENT
Start: 2022-01-11

## 2022-01-11 RX ORDER — ONDANSETRON 2 MG/ML
4 INJECTION INTRAMUSCULAR; INTRAVENOUS EVERY 6 HOURS PRN
Status: DISCONTINUED | OUTPATIENT
Start: 2022-01-11 | End: 2022-01-14 | Stop reason: HOSPADM

## 2022-01-11 RX ORDER — HYDRALAZINE HYDROCHLORIDE 25 MG/1
25 TABLET, FILM COATED ORAL EVERY 8 HOURS SCHEDULED
Status: DISCONTINUED | OUTPATIENT
Start: 2022-01-11 | End: 2022-01-14 | Stop reason: HOSPADM

## 2022-01-11 RX ADMIN — ISOSORBIDE MONONITRATE 30 MG: 30 TABLET, EXTENDED RELEASE ORAL at 10:32

## 2022-01-11 RX ADMIN — CALCIUM ACETATE 1334 MG: 667 CAPSULE ORAL at 10:33

## 2022-01-11 RX ADMIN — INSULIN DETEMIR 15 UNITS: 100 INJECTION, SOLUTION SUBCUTANEOUS at 21:01

## 2022-01-11 RX ADMIN — LEVOTHYROXINE SODIUM 25 MCG: 25 TABLET ORAL at 10:32

## 2022-01-11 RX ADMIN — CETIRIZINE HYDROCHLORIDE 5 MG: 10 TABLET, FILM COATED ORAL at 10:35

## 2022-01-11 RX ADMIN — SODIUM ZIRCONIUM CYCLOSILICATE 10 G: 10 POWDER, FOR SUSPENSION ORAL at 10:36

## 2022-01-11 RX ADMIN — HYDRALAZINE HYDROCHLORIDE 50 MG: 50 TABLET ORAL at 10:35

## 2022-01-11 RX ADMIN — CARVEDILOL 12.5 MG: 6.25 TABLET, FILM COATED ORAL at 10:34

## 2022-01-11 RX ADMIN — TRAZODONE HYDROCHLORIDE 100 MG: 50 TABLET ORAL at 21:00

## 2022-01-11 RX ADMIN — LACTULOSE 20 G: 10 SOLUTION ORAL at 17:33

## 2022-01-11 RX ADMIN — HEPARIN SODIUM 5000 UNITS: 5000 INJECTION INTRAVENOUS; SUBCUTANEOUS at 14:30

## 2022-01-11 RX ADMIN — CALCIUM ACETATE 1334 MG: 667 CAPSULE ORAL at 14:29

## 2022-01-11 RX ADMIN — HYDROXYZINE HYDROCHLORIDE 25 MG: 25 TABLET ORAL at 17:37

## 2022-01-11 RX ADMIN — HYDROCODONE BITARTRATE AND ACETAMINOPHEN 1 TABLET: 10; 325 TABLET ORAL at 14:30

## 2022-01-11 RX ADMIN — PRAVASTATIN SODIUM 20 MG: 40 TABLET ORAL at 10:36

## 2022-01-11 RX ADMIN — ROPINIROLE HYDROCHLORIDE 0.5 MG: 0.25 TABLET, FILM COATED ORAL at 10:36

## 2022-01-11 RX ADMIN — CARVEDILOL 12.5 MG: 6.25 TABLET, FILM COATED ORAL at 17:35

## 2022-01-11 RX ADMIN — SODIUM CHLORIDE 75 ML/HR: 9 INJECTION, SOLUTION INTRAVENOUS at 07:52

## 2022-01-11 RX ADMIN — ONDANSETRON 4 MG: 2 INJECTION INTRAMUSCULAR; INTRAVENOUS at 14:30

## 2022-01-11 RX ADMIN — HEPARIN SODIUM 5000 UNITS: 5000 INJECTION INTRAVENOUS; SUBCUTANEOUS at 05:47

## 2022-01-11 RX ADMIN — SODIUM CHLORIDE, PRESERVATIVE FREE 10 ML: 5 INJECTION INTRAVENOUS at 10:37

## 2022-01-11 RX ADMIN — LACTULOSE 20 G: 10 SOLUTION ORAL at 21:00

## 2022-01-11 RX ADMIN — CALCITRIOL 0.25 MCG: 0.25 CAPSULE, LIQUID FILLED ORAL at 10:34

## 2022-01-11 RX ADMIN — HEPARIN SODIUM 5000 UNITS: 5000 INJECTION INTRAVENOUS; SUBCUTANEOUS at 21:00

## 2022-01-11 RX ADMIN — HYDRALAZINE HYDROCHLORIDE 25 MG: 50 TABLET, FILM COATED ORAL at 22:49

## 2022-01-11 RX ADMIN — INSULIN ASPART 3 UNITS: 100 INJECTION, SOLUTION INTRAVENOUS; SUBCUTANEOUS at 17:33

## 2022-01-11 RX ADMIN — ROPINIROLE HYDROCHLORIDE 0.5 MG: 0.25 TABLET, FILM COATED ORAL at 21:00

## 2022-01-11 RX ADMIN — GABAPENTIN 300 MG: 300 CAPSULE ORAL at 21:01

## 2022-01-11 RX ADMIN — PANTOPRAZOLE SODIUM 40 MG: 40 TABLET, DELAYED RELEASE ORAL at 10:36

## 2022-01-11 RX ADMIN — DEXTROSE MONOHYDRATE 25 G: 500 INJECTION PARENTERAL at 07:26

## 2022-01-11 RX ADMIN — INSULIN ASPART 2 UNITS: 100 INJECTION, SOLUTION INTRAVENOUS; SUBCUTANEOUS at 11:48

## 2022-01-11 RX ADMIN — HYDROCODONE BITARTRATE AND ACETAMINOPHEN 1 TABLET: 10; 325 TABLET ORAL at 01:49

## 2022-01-11 RX ADMIN — CLOPIDOGREL 75 MG: 75 TABLET, FILM COATED ORAL at 10:35

## 2022-01-11 RX ADMIN — CALCIUM ACETATE 1334 MG: 667 CAPSULE ORAL at 17:35

## 2022-01-11 RX ADMIN — SODIUM CHLORIDE, PRESERVATIVE FREE 10 ML: 5 INJECTION INTRAVENOUS at 21:01

## 2022-01-11 RX ADMIN — LACTULOSE 20 G: 10 SOLUTION ORAL at 10:32

## 2022-01-11 RX ADMIN — CLONIDINE HYDROCHLORIDE 0.1 MG: 0.1 TABLET ORAL at 10:35

## 2022-01-11 RX ADMIN — CLONIDINE HYDROCHLORIDE 0.1 MG: 0.1 TABLET ORAL at 21:01

## 2022-01-11 NOTE — PLAN OF CARE
Goal Outcome Evaluation:    Patient is resting in bed with no s/s of distress. In the AM patient glucose was 52,  one dose of PRN IV push DW50 was given. Patient glucose has maintained above a 100 throughout the rest of the  shift. Patient also received a unit of blood this morning, tolerated well. Will continue with plan of care.

## 2022-01-11 NOTE — H&P
[facility] HOSPITALIST HISTORY AND PHYSICAL    Patient Identification:  Name:  Reny Elena  Age:  61 y.o.  Sex:  female  :  1960  MRN:  [id number]   Visit Number:  [id number]  Admit Date: 2022   Room number:  3304/1S  Primary Care Physician:  Susan Stephens APRN     Subjective     Chief complaint:    Chief Complaint   Patient presents with   • Hypertension   • Chronic Renal Failure       History of presenting illness:  61 y.o. female with past medical history significant for stage V CKD with plans to transition to peritoneal dialysis, hypertension, hyperlipidemia, combined systolic and diastolic heart failure, diabetes mellitus type 2, hypothyroidism, seizure disorder, GERD and anxiety/depression who presented to the hospital for elevated blood pressure.  Patient states that she has not been feeling well for the last 24 to 48 hours with nausea and abdominal discomfort.  Upon presentation to the emergency department patient noted to have blood pressure elevated systolic greater than 230 over diastolic 130.  Patient also reported headache at the time.  She states that she has had poor oral intake over the last several days.  She denies any chest pain, shortness of breath, active abdominal pain at this time and states that she does feel improved from when she presented.  Patient reports that she has not yet started peritoneal dialysis and is still awaiting her training for this which is scheduled to start in the next couple weeks.  She does report some increased blood pressure and hyperglycemia home but denies missing any doses of her diabetes medications or antihypertensives.    Patient's initial evaluation at time of presentation significant for potassium of 6.2 with an elevated glucose of 307 at presentation with a creatinine of 4.75.  Of note last creatinine at time of discharge in 2021 was 4.49.  Patient CBC without leukocytosis or thrombocytopenia or thrombocytosis.   Hemoglobin and hematocrit of 8.5 and 27.4 respectively.  ---------------------------------------------------------------------------------------------------------------------   Review of Systems a 14 system review of systems was performed with pertinent positives and negatives as above in HPI.  ---------------------------------------------------------------------------------------------------------------------   Past Medical History:   Diagnosis Date   • Arthritis    • Closed fracture of right fibula and tibia 2018   • Depression    • Diabetic ulcer of left foot associated with type 2 diabetes mellitus ([facility]) 2017   • Diastolic dysfunction    • Disease of thyroid gland    • Elevated cholesterol    • End stage renal disease ([facility])    • Essential hypertension    • GERD (gastroesophageal reflux disease)    • History of transfusion    • Hyperlipidemia    • Iron deficiency anemia 2018   • PAD (peripheral artery disease) ([facility])    • Renal failure    • Type 2 diabetes mellitus with hyperglycemia, with long-term current use of insulin ([facility]) 2018     Past Surgical History:   Procedure Laterality Date   • ABDOMINAL SURGERY     • BACK SURGERY      Post spinal diskectomy, osteophytectomy in one lumbar interspace   • CATARACT EXTRACTION      Left    •  SECTION     • DENTAL PROCEDURE     • ENDOSCOPY     • FRACTURE SURGERY      right leg   • HYSTERECTOMY     • INCISION AND DRAINAGE LEG Left 4/15/2017    Procedure: INCISION AND DRAINAGE LOWER EXTREMITY;  Surgeon: Basilio Morris MD;  Location: [facility];  Service:    • INCISION AND DRAINAGE LEG Left 2017    Procedure: Irrigation and Debridment abcess diabetic wound left foot with deep culture;  Surgeon: Basilio Morris MD;  Location: [facility] OR;  Service:    • INSERTION PERITONEAL DIALYSIS CATHETER N/A 2021    Procedure: INSERTION PERITONEAL DIALYSIS CATHETER LAPAROSCOPIC;  Surgeon: Edy Glover MD;  Location: [facility] OR;  Service: General;   Laterality: N/A;   • KNEE ARTHROSCOPY Right    • ORIF TIBIA FRACTURE Right 12/28/2018    Procedure: TIBIAL  FRACTURE OPEN REDUCTION INTERNAL FIXATION;  Surgeon: Jung Barragan MD;  Location: [facility];  Service: Orthopedics   • TOE AMPUTATION Right     5th   • TUBAL ABDOMINAL LIGATION       Family History   Problem Relation Age of Onset   • Heart disease Mother    • Cancer Mother    • COPD Mother    • Diabetes Other    • Depression Other      Social History     Socioeconomic History   • Marital status:    Tobacco Use   • Smoking status: Never Smoker   • Smokeless tobacco: Never Used   Vaping Use   • Vaping Use: Never used   Substance and Sexual Activity   • Alcohol use: No   • Drug use: No   • Sexual activity: Defer     ---------------------------------------------------------------------------------------------------------------------   Allergies:  Penicillins, Codeine, and Sulfa antibiotics  ---------------------------------------------------------------------------------------------------------------------   Medications below are reported home medications pulling from within the system; at this time, these medications have not been reconciled unless otherwise specified and are in the verification process for further verifcation as current home medications.    Prior to Admission Medications     Prescriptions Last Dose Informant Patient Reported? Taking?    calcitriol (ROCALTROL) 0.25 MCG capsule 1/9/2022 Self Yes Yes    Take 0.25 mcg by mouth Daily.    calcium acetate (PHOS BINDER,) 667 MG capsule capsule 1/9/2022 Self Yes Yes    Take 1,334 mg by mouth 3 (Three) Times a Day With Meals.    carvedilol (COREG) 12.5 MG tablet 1/9/2022 Self Yes Yes    Take 12.5 mg by mouth 2 (Two) Times a Day With Meals.    cetirizine (zyrTEC) 10 MG tablet 1/9/2022 Self Yes Yes    Take 10 mg by mouth Daily.    cloNIDine (CATAPRES) 0.1 MG tablet 1/9/2022 Self Yes Yes    Take 0.1 mg by mouth 2 (Two) Times a Day.    clopidogrel  (PLAVIX) 75 MG tablet 1/9/2022 Self No Yes    Take 1 tablet by mouth Daily.    fluticasone (FLONASE) 50 MCG/ACT nasal spray 1/9/2022 Self Yes Yes    2 sprays into the nostril(s) as directed by provider Daily As Needed for Allergies.    gabapentin (NEURONTIN) 600 MG tablet 1/9/2022 Self No Yes    Take 0.5 tablets by mouth Every Night.    hydrALAZINE (APRESOLINE) 50 MG tablet 1/9/2022 Self Yes Yes    Take 50 mg by mouth Every 12 (Twelve) Hours.    insulin aspart (novoLOG) 100 UNIT/ML injection 1/9/2022 Self Yes Yes    Inject 3 Units under the skin into the appropriate area as directed 3 (Three) Times a Day Before Meals.    insulin glargine (LANTUS, SEMGLEE) 100 UNIT/ML injection 1/9/2022 Self Yes Yes    Inject 10 Units under the skin into the appropriate area as directed 2 (Two) Times a Day.    isosorbide mononitrate (IMDUR) 30 MG 24 hr tablet 1/9/2022 Self Yes Yes    Take 30 mg by mouth Daily.    levothyroxine (SYNTHROID, LEVOTHROID) 25 MCG tablet 1/9/2022 Self Yes Yes    Take 25 mcg by mouth Daily.    pantoprazole (PROTONIX) 40 MG EC tablet 1/9/2022 Self Yes Yes    Take 40 mg by mouth Daily.    pravastatin (PRAVACHOL) 20 MG tablet 1/9/2022 Self Yes Yes    Take 20 mg by mouth Daily.    rOPINIRole (REQUIP) 0.5 MG tablet 1/9/2022 Self Yes Yes    Take 0.5 mg by mouth 2 (Two) Times a Day.    traZODone (DESYREL) 100 MG tablet Past Week Self No Yes    Take 1 tablet by mouth Every Night.    HYDROcodone-acetaminophen (NORCO)  MG per tablet Unknown Self No No    Take 1 tablet by mouth Every 8 (Eight) Hours As Needed for Mild Pain . Pharmacy directions states every 4 to 6 hours prn pain.    hydrOXYzine (ATARAX) 25 MG tablet Unknown Self No No    Take 1 tablet by mouth 3 (Three) Times a Day As Needed for Anxiety.    tiZANidine (ZANAFLEX) 4 MG tablet Unknown Self Yes No    Take 4 mg by mouth Every 6 (Six) Hours As Needed for Muscle Spasms.        Objective     Vital Signs:  Temp:  [97.9 °F (36.6 °C)] 97.9 °F (36.6  °C)  Heart Rate:  [65-86] 79  Resp:  [18] 18  BP: ()/() 176/81    Mean Arterial Pressure (Non-Invasive) for the past 24 hrs (Last 3 readings):   Noninvasive MAP (mmHg)   01/10/22 1753 98   01/10/22 1703 84   01/10/22 1648 100     SpO2:  [91 %-100 %] 96 %  on   ;   Device (Oxygen Therapy): room air  Body mass index is 20.8 kg/m².    Wt Readings from Last 3 Encounters:   01/10/22 56.7 kg (125 lb)   12/16/21 56.7 kg (125 lb)   12/09/21 57.6 kg (127 lb)      ---------------------------------------------------------------------------------------------------------------------   Physical Exam:  Constitutional:  Well-developed and well-nourished.  No respiratory distress.      HENT:  Head: Normocephalic and atraumatic.  Mouth:  Moist mucous membranes.    Eyes:  Conjunctivae and EOM are normal.  Pupils are equal, round, and reactive to light.  No scleral icterus.  Cardiovascular:  Normal rate, regular rhythm and normal heart sounds with no murmur.  Pulmonary/Chest:  No respiratory distress, no wheezes, no crackles, with normal breath sounds and good air movement.  Abdominal:  Soft.  Bowel sounds are normal.  No distension and no tenderness.  There is a peritoneal dialysis catheter present in the right lower quadrant.  Musculoskeletal:  No edema, no tenderness, and no deformity.  No red or swollen joints anywhere.    Neurological:  Alert and oriented to person, place, and time.  No cranial nerve deficit.    Skin:  Skin is warm and dry.  No rash noted.  No pallor.   Peripheral vascular:  No edema and pulses on all 4 extremities.  Genitourinary:  ---------------------------------------------------------------------------------------------------------------------  EKG:              Last echocardiogram:  Results for orders placed during the hospital encounter of 11/04/21    Adult Transthoracic Echo Complete W/ Cont if Necessary Per Protocol    Interpretation Summary  · The left ventricular cavity is small in size.  ·  Estimated left ventricular EF = 60% Left ventricular systolic function is normal.  · Left ventricular wall thickness is consistent with mild to moderate concentric hypertrophy.  · Left ventricular diastolic function is consistent with (grade I) impaired relaxation.  · Estimated right ventricular systolic pressure from tricuspid regurgitation is normal (<35 mmHg).  · The valvular regurgitations noticed in the study are within normal limits    --------------------------------------------------------------------------------------------------------------------  Labs:  Results from last 7 days   Lab Units 01/10/22  0729 01/09/22  1802   SED RATE mm/hr 23  --    CRP mg/dL <0.30 <0.30   WBC 10*3/mm3 6.32 5.82   HEMOGLOBIN g/dL 8.5* 9.1*   HEMATOCRIT % 27.4* 29.1*   MCV fL 97.9* 95.4   MCHC g/dL 31.0* 31.3*   PLATELETS 10*3/mm3 170 181         Results from last 7 days   Lab Units 01/10/22  1602 01/10/22  0729 01/10/22  0316 01/10/22  0036 01/09/22  1802   SODIUM mmol/L 137 136 138   < > 135*   POTASSIUM mmol/L 5.8* 5.5* 5.2   < > 6.2*   MAGNESIUM mg/dL  --   --   --   --  2.0   CHLORIDE mmol/L 108* 106 108*   < > 105   CO2 mmol/L 14.9* 15.6* 17.7*   < > 17.0*   BUN mg/dL 65* 67* 70*   < > 67*   CREATININE mg/dL 4.97* 5.09* 5.06*   < > 4.75*   EGFR IF NONAFRICN AM mL/min/1.73 9* 9* 9*   < > 9*   CALCIUM mg/dL 8.5* 9.1 8.9   < > 8.7   GLUCOSE mg/dL 216* 244* 220*   < > 307*   ALBUMIN g/dL  --  3.88  --   --  4.02   BILIRUBIN mg/dL  --  0.2  --   --  0.2   ALK PHOS U/L  --  82  --   --  98   AST (SGOT) U/L  --  11  --   --  12   ALT (SGPT) U/L  --  10  --   --  10    < > = values in this interval not displayed.   Estimated Creatinine Clearance: 10.4 mL/min (A) (by C-G formula based on SCr of 4.97 mg/dL (H)).    No results found for: AMMONIA  Results from last 7 days   Lab Units 01/10/22  0729 01/09/22 2000 01/09/22  1802   TROPONIN T ng/mL 0.050* 0.039* 0.041*         Glucose   Date/Time Value Ref Range Status   01/10/2022  0950 252 (H) 70 - 130 mg/dL Final     Comment:     Meter: [id number] : [id number] GIANLUCA MCARTHUR   01/10/2022 0036 266 (H) 70 - 130 mg/dL Final     Comment:     Meter: [id number] : [id number] ang bo   01/09/2022 2319 267 (H) 70 - 130 mg/dL Final     Comment:     Meter: [id number] : [id number] sejal [facility]   01/09/2022 2147 220 (H) 70 - 130 mg/dL Final     Comment:     Meter: [id number] : [id number] [facility]     Lab Results   Component Value Date    TSH 6.100 (H) 01/09/2022    FREET4 1.03 01/09/2022     No results found for: PREGTESTUR, PREGSERUM, HCG, HCGQUANT  Pain Management Panel     Pain Management Panel Latest Ref Rng & Units 11/6/2021 11/6/2021    CREATININE UR mg/dL 24.4 24.4    AMPHETAMINES SCREEN, URINE Negative - -    BARBITURATES SCREEN Negative - -    BENZODIAZEPINE SCREEN, URINE Negative - -    BUPRENORPHINEUR Negative - -    COCAINE SCREEN, URINE Negative - -    METHADONE SCREEN, URINE Negative - -    METHAMPHETAMINEUR Negative - -        Brief Urine Lab Results  (Last result in the past 365 days)      Color   Clarity   Blood   Leuk Est   Nitrite   Protein   CREAT   Urine HCG        11/06/21 1626             24.4         11/06/21 1626             24.4             No results found for: BLOODCX  No results found for: URINECX  No results found for: WOUNDCX  No results found for: STOOLCX    I have personally looked at the labs and they are summarized above.  ----------------------------------------------------------------------------------------------------------------------  Detailed radiology reports for the last 24 hours:    Imaging Results (Last 24 Hours)     ** No results found for the last 24 hours. **        Final impressions for the last 30 days of radiology reports:    No radiology results for the last 30 days.  I have personally looked at the radiology images and read the final radiology report.    Assessment & Plan      Hypertensive emergency    -Blood  pressure elevated with systolics greater than 230 at presentation now improved after receiving IV antihypertensives and being started on her home medications in the emergency department.    -Overall no evidence of any volume overload despite the elevated blood pressures and her renal dysfunction placing her at increased risk for flash pulmonary edema.    -We will place order for as needed hydralazine however cautious usage as patient with significant swings in blood pressure in the emergency department after administration of labetalol.      -Resume home antihypertensives and monitor blood pressure.    Acute hyperkalemia  CKD stage V  Status post PD catheter placement, not yet initiated    -Patient with improvement in potassium after administration of lactulose and potassium binder and insulin.    -Nephrology consulted, appreciate input    -Repeat CMP in the a.m.    -Continue normal saline however will reduce rate to 75 cc an hour given patient's history of heart failure    Diabetes mellitus type 2     -Basal and bolus insulin to maintain target glucose of 140-180    NSTEMI, type II    -Patient with accelerated hypertension and chronic kidney disease with overall fairly unimpressive rise in troponin with flat trend    -Patient chest pain-free at this time    -EKG without any ischemic changes    -Continue to monitor on telemetry and for any complaints of chest pain.    Heart failure with reduced ejection fraction    -Patient appears euvolemic on exam    -Patient most recent echo in November 2021 reviewed, shows evidence of recovery after previous EF of 46 to 50% now reading greater than 60%.    -Cautious IV fluid administration given heart failure.    Seizure disorder    -Resume home medications once reconciled per pharmacy    Normocytic anemia    -Patient with history of anemia of chronic disease with slight decline in hemoglobin since initial presentation however patient has been more than adequately volume  resuscitated in the emergency department.    -Continue to monitor for now with repeat CBC in a.m.    -Threshold to transfuse for hemoglobin less than 7.  Consideration of EPO at nephrology's discretion.    Anxiety  Depression    -Supportive care and resume home medications once reconciled    VTE Prophylaxis:   Mechanical Order History:     None      Pharmalogical Order History:      Ordered     Dose Route Frequency Stop    01/10/22 1812  heparin (porcine) 5000 UNIT/ML injection 5,000 Units         5,000 Units SC Every 8 Hours Scheduled --                The patient is high risk due to the following diagnoses/reasons: Acute hyperkalemia in the setting of chronic kidney disease requiring peritoneal dialysis.  She has not yet initiated as well as hypertensive emergency with markedly elevated systolic blood pressures requiring inpatient hospitalization and IV antihypertensives.    Disposition Home once medically stable and improved.    Emanuel Hartley DO  [facility]  01/10/22  19:43 EST

## 2022-01-11 NOTE — PROGRESS NOTES
Notified by RN of patient's hemoglobin was critical at 6.6 from 8.5. Suspect possible hemodilution. Patient's hemoglobin has came back above 7 on repeat lab draws. I have ordered a repeat hemoglobin/hematocrit for 10 a.m.

## 2022-01-11 NOTE — PAYOR COMM NOTE
"[facility]  NPI:[id number]    Utilization Review  Contact: Adele Oropeza RN  Phone: [phone]  Fax:[phone]    INITIATE INPATIENT AUTHORIZATION   E87.5      Reny Merino (61 y.o. Female)             Date of Birth Social Security Number Address Home Phone MRN    1960  [address] [id number] [phone] [id number]    Sikh Marital Status             Islam        Admission Date Admission Type Admitting Provider Attending Provider Department, Room/Bed    1/10/22 Emergency Emanuel Hartley DO Cagle, William, DO [address], 3304/1S    Discharge Date Discharge Disposition Discharge Destination                         Attending Provider: Emanuel Hartley DO    Allergies: Penicillins, Codeine, Sulfa Antibiotics    Isolation: None   Infection: None   Code Status: No CPR   Advance Care Planning Activity    Ht: 165.1 cm (65\")   Wt: 56.7 kg (125 lb)    Admission Cmt: None   Principal Problem: None                Active Insurance as of 2022     Primary Coverage     Payor Plan Insurance Group Employer/Plan Group    [employer] [place] [employer] [place]      Payor Plan Address Payor Plan Phone Number Payor Plan Fax Number Effective Dates    [address] [address] [phone]  2017 - None Entered    [facility] [address]       Subscriber Name Subscriber Birth Date Member ID       RENY MERINO 1960 [id number]                 Emergency Contacts      (Rel.) Home Phone Work Phone Mobile Phone    Cliff Leiva (Significant Other) -- -- [phone]    Liza Oliva (Daughter) -- -- [phone]               History & Physical      Emanuel Hartley DO at 01/10/22 [facility]3              [facility] HOSPITALIST HISTORY AND PHYSICAL    Patient Identification:  Name:  Reny Merino  Age:  61 y.o.  Sex:  female  :  1960  MRN:  [id number]   Visit Number:  [id number]  Admit Date: 2022   Room number:  3304/1S  Primary Care " Physician:  Susan Stephens APRN     Subjective     Chief complaint:    Chief Complaint   Patient presents with   • Hypertension   • Chronic Renal Failure       History of presenting illness:  61 y.o. female with past medical history significant for stage V CKD with plans to transition to peritoneal dialysis, hypertension, hyperlipidemia, combined systolic and diastolic heart failure, diabetes mellitus type 2, hypothyroidism, seizure disorder, GERD and anxiety/depression who presented to the hospital for elevated blood pressure.  Patient states that she has not been feeling well for the last 24 to 48 hours with nausea and abdominal discomfort.  Upon presentation to the emergency department patient noted to have blood pressure elevated systolic greater than 230 over diastolic 130.  Patient also reported headache at the time.  She states that she has had poor oral intake over the last several days.  She denies any chest pain, shortness of breath, active abdominal pain at this time and states that she does feel improved from when she presented.  Patient reports that she has not yet started peritoneal dialysis and is still awaiting her training for this which is scheduled to start in the next couple weeks.  She does report some increased blood pressure and hyperglycemia home but denies missing any doses of her diabetes medications or antihypertensives.    Patient's initial evaluation at time of presentation significant for potassium of 6.2 with an elevated glucose of 307 at presentation with a creatinine of 4.75.  Of note last creatinine at time of discharge in November 2021 was 4.49.  Patient CBC without leukocytosis or thrombocytopenia or thrombocytosis.  Hemoglobin and hematocrit of 8.5 and 27.4 respectively.  ---------------------------------------------------------------------------------------------------------------------   Review of Systems a 14 system review of systems was performed with pertinent positives  and negatives as above in HPI.  ---------------------------------------------------------------------------------------------------------------------   Past Medical History:   Diagnosis Date   • Arthritis    • Closed fracture of right fibula and tibia 2018   • Depression    • Diabetic ulcer of left foot associated with type 2 diabetes mellitus ([facility]) 2017   • Diastolic dysfunction    • Disease of thyroid gland    • Elevated cholesterol    • End stage renal disease ([facility])    • Essential hypertension    • GERD (gastroesophageal reflux disease)    • History of transfusion    • Hyperlipidemia    • Iron deficiency anemia 2018   • PAD (peripheral artery disease) ([facility])    • Renal failure    • Type 2 diabetes mellitus with hyperglycemia, with long-term current use of insulin ([facility]) 2018     Past Surgical History:   Procedure Laterality Date   • ABDOMINAL SURGERY     • BACK SURGERY      Post spinal diskectomy, osteophytectomy in one lumbar interspace   • CATARACT EXTRACTION      Left    •  SECTION     • DENTAL PROCEDURE     • ENDOSCOPY     • FRACTURE SURGERY      right leg   • HYSTERECTOMY     • INCISION AND DRAINAGE LEG Left 4/15/2017    Procedure: INCISION AND DRAINAGE LOWER EXTREMITY;  Surgeon: Basilio Morris MD;  Location: [facility];  Service:    • INCISION AND DRAINAGE LEG Left 2017    Procedure: Irrigation and Debridment abcess diabetic wound left foot with deep culture;  Surgeon: Basilio Morris MD;  Location: [facility] OR;  Service:    • INSERTION PERITONEAL DIALYSIS CATHETER N/A 2021    Procedure: INSERTION PERITONEAL DIALYSIS CATHETER LAPAROSCOPIC;  Surgeon: Edy Glover MD;  Location: [facility] OR;  Service: General;  Laterality: N/A;   • KNEE ARTHROSCOPY Right    • ORIF TIBIA FRACTURE Right 2018    Procedure: TIBIAL  FRACTURE OPEN REDUCTION INTERNAL FIXATION;  Surgeon: Jung Barragan MD;  Location: [facility] OR;  Service: Orthopedics   • TOE AMPUTATION Right     5th   •  TUBAL ABDOMINAL LIGATION       Family History   Problem Relation Age of Onset   • Heart disease Mother    • Cancer Mother    • COPD Mother    • Diabetes Other    • Depression Other      Social History     Socioeconomic History   • Marital status:    Tobacco Use   • Smoking status: Never Smoker   • Smokeless tobacco: Never Used   Vaping Use   • Vaping Use: Never used   Substance and Sexual Activity   • Alcohol use: No   • Drug use: No   • Sexual activity: Defer     ---------------------------------------------------------------------------------------------------------------------   Allergies:  Penicillins, Codeine, and Sulfa antibiotics  ---------------------------------------------------------------------------------------------------------------------   Medications below are reported home medications pulling from within the system; at this time, these medications have not been reconciled unless otherwise specified and are in the verification process for further verifcation as current home medications.    Prior to Admission Medications     Prescriptions Last Dose Informant Patient Reported? Taking?    calcitriol (ROCALTROL) 0.25 MCG capsule 1/9/2022 Self Yes Yes    Take 0.25 mcg by mouth Daily.    calcium acetate (PHOS BINDER,) 667 MG capsule capsule 1/9/2022 Self Yes Yes    Take 1,334 mg by mouth 3 (Three) Times a Day With Meals.    carvedilol (COREG) 12.5 MG tablet 1/9/2022 Self Yes Yes    Take 12.5 mg by mouth 2 (Two) Times a Day With Meals.    cetirizine (zyrTEC) 10 MG tablet 1/9/2022 Self Yes Yes    Take 10 mg by mouth Daily.    cloNIDine (CATAPRES) 0.1 MG tablet 1/9/2022 Self Yes Yes    Take 0.1 mg by mouth 2 (Two) Times a Day.    clopidogrel (PLAVIX) 75 MG tablet 1/9/2022 Self No Yes    Take 1 tablet by mouth Daily.    fluticasone (FLONASE) 50 MCG/ACT nasal spray 1/9/2022 Self Yes Yes    2 sprays into the nostril(s) as directed by provider Daily As Needed for Allergies.    gabapentin (NEURONTIN) 600  MG tablet 1/9/2022 Self No Yes    Take 0.5 tablets by mouth Every Night.    hydrALAZINE (APRESOLINE) 50 MG tablet 1/9/2022 Self Yes Yes    Take 50 mg by mouth Every 12 (Twelve) Hours.    insulin aspart (novoLOG) 100 UNIT/ML injection 1/9/2022 Self Yes Yes    Inject 3 Units under the skin into the appropriate area as directed 3 (Three) Times a Day Before Meals.    insulin glargine (LANTUS, SEMGLEE) 100 UNIT/ML injection 1/9/2022 Self Yes Yes    Inject 10 Units under the skin into the appropriate area as directed 2 (Two) Times a Day.    isosorbide mononitrate (IMDUR) 30 MG 24 hr tablet 1/9/2022 Self Yes Yes    Take 30 mg by mouth Daily.    levothyroxine (SYNTHROID, LEVOTHROID) 25 MCG tablet 1/9/2022 Self Yes Yes    Take 25 mcg by mouth Daily.    pantoprazole (PROTONIX) 40 MG EC tablet 1/9/2022 Self Yes Yes    Take 40 mg by mouth Daily.    pravastatin (PRAVACHOL) 20 MG tablet 1/9/2022 Self Yes Yes    Take 20 mg by mouth Daily.    rOPINIRole (REQUIP) 0.5 MG tablet 1/9/2022 Self Yes Yes    Take 0.5 mg by mouth 2 (Two) Times a Day.    traZODone (DESYREL) 100 MG tablet Past Week Self No Yes    Take 1 tablet by mouth Every Night.    HYDROcodone-acetaminophen (NORCO)  MG per tablet Unknown Self No No    Take 1 tablet by mouth Every 8 (Eight) Hours As Needed for Mild Pain . Pharmacy directions states every 4 to 6 hours prn pain.    hydrOXYzine (ATARAX) 25 MG tablet Unknown Self No No    Take 1 tablet by mouth 3 (Three) Times a Day As Needed for Anxiety.    tiZANidine (ZANAFLEX) 4 MG tablet Unknown Self Yes No    Take 4 mg by mouth Every 6 (Six) Hours As Needed for Muscle Spasms.        Objective     Vital Signs:  Temp:  [97.9 °F (36.6 °C)] 97.9 °F (36.6 °C)  Heart Rate:  [65-86] 79  Resp:  [18] 18  BP: ()/() 176/81    Mean Arterial Pressure (Non-Invasive) for the past 24 hrs (Last 3 readings):   Noninvasive MAP (mmHg)   01/10/22 1753 98   01/10/22 1703 84   01/10/22 1648 100     SpO2:  [91 %-100 %] 96 %  on    ;   Device (Oxygen Therapy): room air  Body mass index is 20.8 kg/m².    Wt Readings from Last 3 Encounters:   01/10/22 56.7 kg (125 lb)   12/16/21 56.7 kg (125 lb)   12/09/21 57.6 kg (127 lb)      ---------------------------------------------------------------------------------------------------------------------   Physical Exam:  Constitutional:  Well-developed and well-nourished.  No respiratory distress.      HENT:  Head: Normocephalic and atraumatic.  Mouth:  Moist mucous membranes.    Eyes:  Conjunctivae and EOM are normal.  Pupils are equal, round, and reactive to light.  No scleral icterus.  Cardiovascular:  Normal rate, regular rhythm and normal heart sounds with no murmur.  Pulmonary/Chest:  No respiratory distress, no wheezes, no crackles, with normal breath sounds and good air movement.  Abdominal:  Soft.  Bowel sounds are normal.  No distension and no tenderness.  There is a peritoneal dialysis catheter present in the right lower quadrant.  Musculoskeletal:  No edema, no tenderness, and no deformity.  No red or swollen joints anywhere.    Neurological:  Alert and oriented to person, place, and time.  No cranial nerve deficit.    Skin:  Skin is warm and dry.  No rash noted.  No pallor.   Peripheral vascular:  No edema and pulses on all 4 extremities.  Genitourinary:  ---------------------------------------------------------------------------------------------------------------------  EKG:              Last echocardiogram:  Results for orders placed during the hospital encounter of 11/04/21    Adult Transthoracic Echo Complete W/ Cont if Necessary Per Protocol    Interpretation Summary  · The left ventricular cavity is small in size.  · Estimated left ventricular EF = 60% Left ventricular systolic function is normal.  · Left ventricular wall thickness is consistent with mild to moderate concentric hypertrophy.  · Left ventricular diastolic function is consistent with (grade I) impaired relaxation.  ·  Estimated right ventricular systolic pressure from tricuspid regurgitation is normal (<35 mmHg).  · The valvular regurgitations noticed in the study are within normal limits    --------------------------------------------------------------------------------------------------------------------  Labs:  Results from last 7 days   Lab Units 01/10/22  0729 01/09/22  1802   SED RATE mm/hr 23  --    CRP mg/dL <0.30 <0.30   WBC 10*3/mm3 6.32 5.82   HEMOGLOBIN g/dL 8.5* 9.1*   HEMATOCRIT % 27.4* 29.1*   MCV fL 97.9* 95.4   MCHC g/dL 31.0* 31.3*   PLATELETS 10*3/mm3 170 181         Results from last 7 days   Lab Units 01/10/22  1602 01/10/22  0729 01/10/22  0316 01/10/22  0036 01/09/22  1802   SODIUM mmol/L 137 136 138   < > 135*   POTASSIUM mmol/L 5.8* 5.5* 5.2   < > 6.2*   MAGNESIUM mg/dL  --   --   --   --  2.0   CHLORIDE mmol/L 108* 106 108*   < > 105   CO2 mmol/L 14.9* 15.6* 17.7*   < > 17.0*   BUN mg/dL 65* 67* 70*   < > 67*   CREATININE mg/dL 4.97* 5.09* 5.06*   < > 4.75*   EGFR IF NONAFRICN AM mL/min/1.73 9* 9* 9*   < > 9*   CALCIUM mg/dL 8.5* 9.1 8.9   < > 8.7   GLUCOSE mg/dL 216* 244* 220*   < > 307*   ALBUMIN g/dL  --  3.88  --   --  4.02   BILIRUBIN mg/dL  --  0.2  --   --  0.2   ALK PHOS U/L  --  82  --   --  98   AST (SGOT) U/L  --  11  --   --  12   ALT (SGPT) U/L  --  10  --   --  10    < > = values in this interval not displayed.   Estimated Creatinine Clearance: 10.4 mL/min (A) (by C-G formula based on SCr of 4.97 mg/dL (H)).    No results found for: AMMONIA  Results from last 7 days   Lab Units 01/10/22  0729 01/09/22 2000 01/09/22  1802   TROPONIN T ng/mL 0.050* 0.039* 0.041*         Glucose   Date/Time Value Ref Range Status   01/10/2022 0950 252 (H) 70 - 130 mg/dL Final     Comment:     Meter: [id number] : [id number] GIANLUCA MCARTHUR   01/10/2022 0036 266 (H) 70 - 130 mg/dL Final     Comment:     Meter: [id number] : [id number] ang bo   01/09/2022 2319 267 (H) 70 - 130 mg/dL Final      Comment:     Meter: [id number] : [id number] sejal [place]   01/09/2022 2147 220 (H) 70 - 130 mg/dL Final     Comment:     Meter: [id number] : [id number] [facility]     Lab Results   Component Value Date    TSH 6.100 (H) 01/09/2022    FREET4 1.03 01/09/2022     No results found for: PREGTESTUR, PREGSERUM, HCG, HCGQUANT  Pain Management Panel     Pain Management Panel Latest Ref Rng & Units 11/6/2021 11/6/2021    CREATININE UR mg/dL 24.4 24.4    AMPHETAMINES SCREEN, URINE Negative - -    BARBITURATES SCREEN Negative - -    BENZODIAZEPINE SCREEN, URINE Negative - -    BUPRENORPHINEUR Negative - -    COCAINE SCREEN, URINE Negative - -    METHADONE SCREEN, URINE Negative - -    METHAMPHETAMINEUR Negative - -        Brief Urine Lab Results  (Last result in the past 365 days)      Color   Clarity   Blood   Leuk Est   Nitrite   Protein   CREAT   Urine HCG        11/06/21 1626             24.4         11/06/21 1626             24.4             No results found for: BLOODCX  No results found for: URINECX  No results found for: WOUNDCX  No results found for: STOOLCX    I have personally looked at the labs and they are summarized above.  ----------------------------------------------------------------------------------------------------------------------  Detailed radiology reports for the last 24 hours:    Imaging Results (Last 24 Hours)     ** No results found for the last 24 hours. **        Final impressions for the last 30 days of radiology reports:    No radiology results for the last 30 days.  I have personally looked at the radiology images and read the final radiology report.    Assessment & Plan      Hypertensive emergency    -Blood pressure elevated with systolics greater than 230 at presentation now improved after receiving IV antihypertensives and being started on her home medications in the emergency department.    -Overall no evidence of any volume overload despite the elevated blood  pressures and her renal dysfunction placing her at increased risk for flash pulmonary edema.    -We will place order for as needed hydralazine however cautious usage as patient with significant swings in blood pressure in the emergency department after administration of labetalol.      -Resume home antihypertensives and monitor blood pressure.    Acute hyperkalemia  CKD stage V  Status post PD catheter placement, not yet initiated    -Patient with improvement in potassium after administration of lactulose and potassium binder and insulin.    -Nephrology consulted, appreciate input    -Repeat CMP in the a.m.    -Continue normal saline however will reduce rate to 75 cc an hour given patient's history of heart failure    Diabetes mellitus type 2     -Basal and bolus insulin to maintain target glucose of 140-180    NSTEMI, type II    -Patient with accelerated hypertension and chronic kidney disease with overall fairly unimpressive rise in troponin with flat trend    -Patient chest pain-free at this time    -EKG without any ischemic changes    -Continue to monitor on telemetry and for any complaints of chest pain.    Heart failure with reduced ejection fraction    -Patient appears euvolemic on exam    -Patient most recent echo in November 2021 reviewed, shows evidence of recovery after previous EF of 46 to 50% now reading greater than 60%.    -Cautious IV fluid administration given heart failure.    Seizure disorder    -Resume home medications once reconciled per pharmacy    Normocytic anemia    -Patient with history of anemia of chronic disease with slight decline in hemoglobin since initial presentation however patient has been more than adequately volume resuscitated in the emergency department.    -Continue to monitor for now with repeat CBC in a.m.    -Threshold to transfuse for hemoglobin less than 7.  Consideration of EPO at nephrology's discretion.    Anxiety  Depression    -Supportive care and resume home  medications once reconciled    VTE Prophylaxis:   Mechanical Order History:     None      Pharmalogical Order History:      Ordered     Dose Route Frequency Stop    01/10/22 1812  heparin (porcine) 5000 UNIT/ML injection 5,000 Units         5,000 Units SC Every 8 Hours Scheduled --                The patient is high risk due to the following diagnoses/reasons: Acute hyperkalemia in the setting of chronic kidney disease requiring peritoneal dialysis.  She has not yet initiated as well as hypertensive emergency with markedly elevated systolic blood pressures requiring inpatient hospitalization and IV antihypertensives.    Disposition Home once medically stable and improved.    Emanuel Hartley DO  [facility]  01/10/22  19:43 EST      Electronically signed by Emanuel Hartley DO at 01/10/22 2019          Emergency Department Notes      Anu Bennett PA at 01/09/22 1652     Attestation signed by Yoan Santos MD at 01/09/22 1846    .                         MEDICAL SCREENING:    Reason for Visit: hx of renal failure, worsening hypertension x 2 days, now having vomiting and generalized weakness, last took BP meds at 10:30 am today.    Patient initially seen in triage.  The patient was advised further evaluation and diagnostic testing will be needed, some of the treatment and testing will be initiated in the lobby in order to begin the process.  The patient will be returned to the waiting area for the time being and possibly be re-assessed by a subsequent ED provider.  The patient will be brought back to the treatment area in as timely manner as possible.       Anu Bennett PA  01/09/22 1653      Electronically signed by Yoan Santos MD at 01/09/22 1846     Michael Snyder, RN at 01/10/22 0611        Dialysis port dressing changed at this time.      Michael Snyder, RN  01/10/22 0612      Electronically signed by Michael Snyder, RN at 01/10/22 0612     Krystle Garcia, GLORIA at 01/10/22  0950        POC glucose 252 mg/dL     Krystle Garcia RN  01/10/22 0950      Electronically signed by Krystle Garcia RN at 01/10/22 0950         Facility-Administered Medications as of 1/11/2022   Medication Dose Route Frequency Provider Last Rate Last Admin   • acetaminophen (TYLENOL) tablet 650 mg  650 mg Oral Q6H PRN Yoan Santos MD   650 mg at 01/10/22 0759   • [COMPLETED] albuterol (PROVENTIL) nebulizer solution 0.083% 2.5 mg/3mL  2.5 mg Nebulization Once Helder Barros MD   2.5 mg at 01/09/22 1943   • [COMPLETED] albuterol (PROVENTIL) nebulizer solution 0.083% 2.5 mg/3mL  2.5 mg Nebulization Once Helder Barros MD   2.5 mg at 01/10/22 0154   • [COMPLETED] aspirin chewable tablet 324 mg  324 mg Oral Once Helder Barros MD   324 mg at 01/09/22 2114   • calcitriol (ROCALTROL) capsule 0.25 mcg  0.25 mcg Oral Daily Emanuel Hartley DO   0.25 mcg at 01/10/22 2102   • calcium acetate (PHOS BINDER)) capsule 1,334 mg  1,334 mg Oral TID With Meals Emanuel Hartley DO   1,334 mg at 01/10/22 2102   • [COMPLETED] calcium gluconate 1g/50ml 0.675% NaCl IV SOLN  1 g Intravenous Q1H Helder Barros MD   Stopped at 01/10/22 0411   • [COMPLETED] carvedilol (COREG) tablet 12.5 mg  12.5 mg Oral Once Yoan Santos MD   12.5 mg at 01/10/22 1031   • carvedilol (COREG) tablet 12.5 mg  12.5 mg Oral BID With Meals Yoan Santos MD   12.5 mg at 01/10/22 1733   • cetirizine (zyrTEC) tablet 5 mg  5 mg Oral Daily Emanuel Hartley DO   5 mg at 01/10/22 2102   • [COMPLETED] cloNIDine (CATAPRES) tablet 0.1 mg  0.1 mg Oral Once Yoan Santos MD   0.1 mg at 01/10/22 0947   • cloNIDine (CATAPRES) tablet 0.1 mg  0.1 mg Oral BID Emanuel Hartley DO   0.1 mg at 01/10/22 2126   • clopidogrel (PLAVIX) tablet 75 mg  75 mg Oral Daily Yoan Santos MD   75 mg at 01/10/22 1130   • dextrose (D50W) (25 g/50 mL) IV injection 25 g  25 g Intravenous Q15 Min PRN Emanuel Hartley DO       • dextrose (GLUTOSE) oral gel 15  g  15 g Oral Q15 Min PRN Emanuel Hartley DO       • fluticasone (FLONASE) 50 MCG/ACT nasal spray 2 spray  2 spray Nasal Daily PRN Emanuel Hartley DO       • gabapentin (NEURONTIN) capsule 300 mg  300 mg Oral Nightly Emanuel Hartley DO   300 mg at 01/10/22 2106   • [COMPLETED] gabapentin (NEURONTIN) capsule 600 mg  600 mg Oral Once Yoan Santos MD   600 mg at 01/10/22 1034   • glucagon (human recombinant) (GLUCAGEN DIAGNOSTIC) injection 1 mg  1 mg Subcutaneous Q15 Min PRN Emanuel Hartley DO       • heparin (porcine) 5000 UNIT/ML injection 5,000 Units  5,000 Units Subcutaneous Q8H Emanuel Hartley DO   5,000 Units at 01/11/22 0547   • [COMPLETED] hydrALAZINE (APRESOLINE) tablet 50 mg  50 mg Oral Once Yoan Santos MD   50 mg at 01/10/22 0947   • hydrALAZINE (APRESOLINE) tablet 50 mg  50 mg Oral Q12H Yoan Santos MD   50 mg at 01/10/22 2103   • [COMPLETED] HYDROcodone-acetaminophen (NORCO)  MG per tablet 1 tablet  1 tablet Oral Once Helder Barros MD   1 tablet at 01/09/22 2114   • [COMPLETED] HYDROcodone-acetaminophen (NORCO)  MG per tablet 1 tablet  1 tablet Oral Once PRN Yoan Santos MD   1 tablet at 01/10/22 1034   • HYDROcodone-acetaminophen (NORCO)  MG per tablet 1 tablet  1 tablet Oral Q8H PRN Emanuel Hartley DO   1 tablet at 01/11/22 0149   • [COMPLETED] hydrOXYzine (ATARAX) tablet 25 mg  25 mg Oral Once Helder Barros MD   25 mg at 01/10/22 0212   • hydrOXYzine (ATARAX) tablet 25 mg  25 mg Oral TID PRN Emanuel Hartley DO       • insulin aspart (novoLOG) injection 0-7 Units  0-7 Units Subcutaneous TID AC Emanuel Hartley DO   5 Units at 01/10/22 2112   • [COMPLETED] insulin detemir (LEVEMIR) injection 10 Units  10 Units Subcutaneous Once Yoan Santos MD   10 Units at 01/10/22 1146   • insulin detemir (LEVEMIR) injection 10 Units  10 Units Subcutaneous Q12H Yoan Santos MD   10 Units at 01/10/22 2346   • [COMPLETED] insulin regular (humuLIN  R,novoLIN R) injection 5 Units  5 Units Intravenous Once Helder Barros MD   5 Units at 01/09/22 2011   • [COMPLETED] insulin regular (humuLIN R,novoLIN R) injection 5 Units  5 Units Intravenous Once Helder Barros MD   5 Units at 01/10/22 0211   • [COMPLETED] isosorbide mononitrate (IMDUR) 24 hr tablet 30 mg  30 mg Oral Once Yoan Santos MD   30 mg at 01/10/22 1031   • isosorbide mononitrate (IMDUR) 24 hr tablet 30 mg  30 mg Oral Daily Yoan Santos MD       • [COMPLETED] labetalol (NORMODYNE,TRANDATE) injection 20 mg  20 mg Intravenous Once Helder Barros MD   20 mg at 01/09/22 2011   • [COMPLETED] labetalol (NORMODYNE,TRANDATE) injection 20 mg  20 mg Intravenous Once Helder Barros MD   20 mg at 01/09/22 2114   • lactulose (CHRONULAC) 10 GM/15ML solution 20 g  20 g Oral TID Helder Barros MD   20 g at 01/10/22 2102   • levothyroxine (SYNTHROID, LEVOTHROID) tablet 25 mcg  25 mcg Oral Daily Yoan Santos MD   25 mcg at 01/10/22 1129   • [COMPLETED] ondansetron (ZOFRAN) injection 4 mg  4 mg Intravenous Once Helder Barros MD   4 mg at 01/09/22 2138   • pantoprazole (PROTONIX) EC tablet 40 mg  40 mg Oral Daily Emanuel Hartley DO   40 mg at 01/10/22 2102   • pravastatin (PRAVACHOL) tablet 20 mg  20 mg Oral Daily Yoan Santos MD   20 mg at 01/10/22 1131   • rOPINIRole (REQUIP) tablet 0.5 mg  0.5 mg Oral BID Emanuel Hartley DO   0.5 mg at 01/10/22 2103   • [COMPLETED] sodium chloride 0.9 % bolus 500 mL  500 mL Intravenous Once Yoan Santos MD   Stopped at 01/10/22 1132   • sodium chloride 0.9 % flush 10 mL  10 mL Intravenous Q12H Emanuel Hartley DO       • sodium chloride 0.9 % flush 10 mL  10 mL Intravenous PRN Emanuel Hartley DO       • sodium chloride 0.9 % infusion  75 mL/hr Intravenous Continuous Emanuel Hartley DO 75 mL/hr at 01/10/22 2103 75 mL/hr at 01/10/22 2103   • sodium zirconium cyclosilicate (LOKELMA) pack 10 g  10 g Oral TID Helder Barros MD   10 g at  01/10/22 2103   • traZODone (DESYREL) tablet 100 mg  100 mg Oral Nightly Emanuel Hartley DO   100 mg at 01/10/22 2103        Physician Progress Notes (all)      Rand Barclay PA-C at 01/11/22 1499     Attestation signed by Berenice Hicks DO at 01/11/22 0184    I have reviewed this documentation and agree.                  Notified by RN of patient's hemoglobin was critical at 6.6 from 8.5. Suspect possible hemodilution. Patient's hemoglobin has came back above 7 on repeat lab draws. I have ordered a repeat hemoglobin/hematocrit for 10 a.m.        Electronically signed by Berenice Hicks DO at 01/11/22 5719       Consult Notes (all)    No notes of this type exist for this encounter.

## 2022-01-11 NOTE — PLAN OF CARE
Goal Outcome Evaluation:               Patient resting quietly in bed with no acute issues noted. VSS and potassium WNL. Hgb 6.6 but redraw placed for 1000. Will continue to monitor.

## 2022-01-12 LAB
ANION GAP SERPL CALCULATED.3IONS-SCNC: 11.1 MMOL/L (ref 5–15)
BASOPHILS # BLD AUTO: 0.03 10*3/MM3 (ref 0–0.2)
BASOPHILS NFR BLD AUTO: 0.8 % (ref 0–1.5)
BH BB BLOOD EXPIRATION DATE: NORMAL
BH BB BLOOD TYPE BARCODE: 5100
BH BB DISPENSE STATUS: NORMAL
BH BB PRODUCT CODE: NORMAL
BH BB UNIT NUMBER: NORMAL
BUN SERPL-MCNC: 58 MG/DL (ref 8–23)
BUN/CREAT SERPL: 12.6 (ref 7–25)
CALCIUM SPEC-SCNC: 7.7 MG/DL (ref 8.6–10.5)
CHLORIDE SERPL-SCNC: 104 MMOL/L (ref 98–107)
CO2 SERPL-SCNC: 17.9 MMOL/L (ref 22–29)
CREAT SERPL-MCNC: 4.6 MG/DL (ref 0.57–1)
CROSSMATCH INTERPRETATION: NORMAL
DEPRECATED RDW RBC AUTO: 53.5 FL (ref 37–54)
EOSINOPHIL # BLD AUTO: 0.22 10*3/MM3 (ref 0–0.4)
EOSINOPHIL NFR BLD AUTO: 5.7 % (ref 0.3–6.2)
ERYTHROCYTE [DISTWIDTH] IN BLOOD BY AUTOMATED COUNT: 15.1 % (ref 12.3–15.4)
GFR SERPL CREATININE-BSD FRML MDRD: 10 ML/MIN/1.73
GFR SERPL CREATININE-BSD FRML MDRD: ABNORMAL ML/MIN/{1.73_M2}
GLUCOSE BLDC GLUCOMTR-MCNC: 111 MG/DL (ref 70–130)
GLUCOSE BLDC GLUCOMTR-MCNC: 183 MG/DL (ref 70–130)
GLUCOSE BLDC GLUCOMTR-MCNC: 183 MG/DL (ref 70–130)
GLUCOSE BLDC GLUCOMTR-MCNC: 202 MG/DL (ref 70–130)
GLUCOSE BLDC GLUCOMTR-MCNC: 239 MG/DL (ref 70–130)
GLUCOSE SERPL-MCNC: 230 MG/DL (ref 65–99)
HCT VFR BLD AUTO: 23.3 % (ref 34–46.6)
HCT VFR BLD AUTO: 23.3 % (ref 34–46.6)
HGB BLD-MCNC: 7.2 G/DL (ref 12–15.9)
HGB BLD-MCNC: 7.3 G/DL (ref 12–15.9)
IMM GRANULOCYTES # BLD AUTO: 0.01 10*3/MM3 (ref 0–0.05)
IMM GRANULOCYTES NFR BLD AUTO: 0.3 % (ref 0–0.5)
IRON 24H UR-MRATE: 121 MCG/DL (ref 37–145)
IRON SATN MFR SERPL: 60 % (ref 20–50)
LYMPHOCYTES # BLD AUTO: 1.18 10*3/MM3 (ref 0.7–3.1)
LYMPHOCYTES NFR BLD AUTO: 30.5 % (ref 19.6–45.3)
MCH RBC QN AUTO: 30.3 PG (ref 26.6–33)
MCHC RBC AUTO-ENTMCNC: 31.3 G/DL (ref 31.5–35.7)
MCV RBC AUTO: 96.7 FL (ref 79–97)
MONOCYTES # BLD AUTO: 0.29 10*3/MM3 (ref 0.1–0.9)
MONOCYTES NFR BLD AUTO: 7.5 % (ref 5–12)
NEUTROPHILS NFR BLD AUTO: 2.14 10*3/MM3 (ref 1.7–7)
NEUTROPHILS NFR BLD AUTO: 55.2 % (ref 42.7–76)
NRBC BLD AUTO-RTO: 0 /100 WBC (ref 0–0.2)
PLATELET # BLD AUTO: 101 10*3/MM3 (ref 140–450)
PMV BLD AUTO: 10.2 FL (ref 6–12)
POTASSIUM SERPL-SCNC: 4.7 MMOL/L (ref 3.5–5.2)
RBC # BLD AUTO: 2.41 10*6/MM3 (ref 3.77–5.28)
SODIUM SERPL-SCNC: 133 MMOL/L (ref 136–145)
TIBC SERPL-MCNC: 201 MCG/DL (ref 298–536)
TRANSFERRIN SERPL-MCNC: 135 MG/DL (ref 200–360)
UNIT  ABO: NORMAL
UNIT  RH: NORMAL
WBC NRBC COR # BLD: 3.87 10*3/MM3 (ref 3.4–10.8)

## 2022-01-12 PROCEDURE — 25010000002 ONDANSETRON PER 1 MG: Performed by: STUDENT IN AN ORGANIZED HEALTH CARE EDUCATION/TRAINING PROGRAM

## 2022-01-12 PROCEDURE — 94660 CPAP INITIATION&MGMT: CPT

## 2022-01-12 PROCEDURE — 83540 ASSAY OF IRON: CPT | Performed by: STUDENT IN AN ORGANIZED HEALTH CARE EDUCATION/TRAINING PROGRAM

## 2022-01-12 PROCEDURE — 85018 HEMOGLOBIN: CPT | Performed by: STUDENT IN AN ORGANIZED HEALTH CARE EDUCATION/TRAINING PROGRAM

## 2022-01-12 PROCEDURE — 99232 SBSQ HOSP IP/OBS MODERATE 35: CPT | Performed by: STUDENT IN AN ORGANIZED HEALTH CARE EDUCATION/TRAINING PROGRAM

## 2022-01-12 PROCEDURE — 85025 COMPLETE CBC W/AUTO DIFF WBC: CPT | Performed by: STUDENT IN AN ORGANIZED HEALTH CARE EDUCATION/TRAINING PROGRAM

## 2022-01-12 PROCEDURE — 80048 BASIC METABOLIC PNL TOTAL CA: CPT | Performed by: STUDENT IN AN ORGANIZED HEALTH CARE EDUCATION/TRAINING PROGRAM

## 2022-01-12 PROCEDURE — 25010000002 HEPARIN (PORCINE) PER 1000 UNITS: Performed by: STUDENT IN AN ORGANIZED HEALTH CARE EDUCATION/TRAINING PROGRAM

## 2022-01-12 PROCEDURE — 94799 UNLISTED PULMONARY SVC/PX: CPT

## 2022-01-12 PROCEDURE — 85014 HEMATOCRIT: CPT | Performed by: STUDENT IN AN ORGANIZED HEALTH CARE EDUCATION/TRAINING PROGRAM

## 2022-01-12 PROCEDURE — 82962 GLUCOSE BLOOD TEST: CPT

## 2022-01-12 PROCEDURE — 63710000001 INSULIN ASPART PER 5 UNITS: Performed by: STUDENT IN AN ORGANIZED HEALTH CARE EDUCATION/TRAINING PROGRAM

## 2022-01-12 PROCEDURE — 84466 ASSAY OF TRANSFERRIN: CPT | Performed by: STUDENT IN AN ORGANIZED HEALTH CARE EDUCATION/TRAINING PROGRAM

## 2022-01-12 RX ADMIN — HYDRALAZINE HYDROCHLORIDE 25 MG: 50 TABLET, FILM COATED ORAL at 06:19

## 2022-01-12 RX ADMIN — ISOSORBIDE MONONITRATE 30 MG: 30 TABLET, EXTENDED RELEASE ORAL at 09:07

## 2022-01-12 RX ADMIN — ROPINIROLE HYDROCHLORIDE 0.5 MG: 0.25 TABLET, FILM COATED ORAL at 20:58

## 2022-01-12 RX ADMIN — HYDROCODONE BITARTRATE AND ACETAMINOPHEN 1 TABLET: 10; 325 TABLET ORAL at 02:52

## 2022-01-12 RX ADMIN — CLONIDINE HYDROCHLORIDE 0.1 MG: 0.1 TABLET ORAL at 09:08

## 2022-01-12 RX ADMIN — HYDROXYZINE HYDROCHLORIDE 25 MG: 25 TABLET ORAL at 09:07

## 2022-01-12 RX ADMIN — CALCIUM ACETATE 1334 MG: 667 CAPSULE ORAL at 11:24

## 2022-01-12 RX ADMIN — CETIRIZINE HYDROCHLORIDE 5 MG: 10 TABLET, FILM COATED ORAL at 09:08

## 2022-01-12 RX ADMIN — CALCIUM ACETATE 1334 MG: 667 CAPSULE ORAL at 16:30

## 2022-01-12 RX ADMIN — PRAVASTATIN SODIUM 20 MG: 40 TABLET ORAL at 09:08

## 2022-01-12 RX ADMIN — INSULIN ASPART 3 UNITS: 100 INJECTION, SOLUTION INTRAVENOUS; SUBCUTANEOUS at 16:30

## 2022-01-12 RX ADMIN — PANTOPRAZOLE SODIUM 40 MG: 40 TABLET, DELAYED RELEASE ORAL at 09:08

## 2022-01-12 RX ADMIN — HEPARIN SODIUM 5000 UNITS: 5000 INJECTION INTRAVENOUS; SUBCUTANEOUS at 20:58

## 2022-01-12 RX ADMIN — CLONIDINE HYDROCHLORIDE 0.1 MG: 0.1 TABLET ORAL at 20:58

## 2022-01-12 RX ADMIN — CALCITRIOL 0.25 MCG: 0.25 CAPSULE, LIQUID FILLED ORAL at 09:07

## 2022-01-12 RX ADMIN — HYDRALAZINE HYDROCHLORIDE 25 MG: 50 TABLET, FILM COATED ORAL at 13:23

## 2022-01-12 RX ADMIN — CARVEDILOL 12.5 MG: 6.25 TABLET, FILM COATED ORAL at 07:50

## 2022-01-12 RX ADMIN — CARVEDILOL 12.5 MG: 6.25 TABLET, FILM COATED ORAL at 16:30

## 2022-01-12 RX ADMIN — SODIUM CHLORIDE, PRESERVATIVE FREE 10 ML: 5 INJECTION INTRAVENOUS at 20:58

## 2022-01-12 RX ADMIN — CLOPIDOGREL 75 MG: 75 TABLET, FILM COATED ORAL at 09:07

## 2022-01-12 RX ADMIN — TRAZODONE HYDROCHLORIDE 100 MG: 50 TABLET ORAL at 20:58

## 2022-01-12 RX ADMIN — ONDANSETRON 4 MG: 2 INJECTION INTRAMUSCULAR; INTRAVENOUS at 02:54

## 2022-01-12 RX ADMIN — SODIUM CHLORIDE 75 ML/HR: 9 INJECTION, SOLUTION INTRAVENOUS at 01:52

## 2022-01-12 RX ADMIN — HYDROCODONE BITARTRATE AND ACETAMINOPHEN 1 TABLET: 10; 325 TABLET ORAL at 13:33

## 2022-01-12 RX ADMIN — HEPARIN SODIUM 5000 UNITS: 5000 INJECTION INTRAVENOUS; SUBCUTANEOUS at 13:23

## 2022-01-12 RX ADMIN — LACTULOSE 20 G: 10 SOLUTION ORAL at 09:08

## 2022-01-12 RX ADMIN — CALCIUM ACETATE 1334 MG: 667 CAPSULE ORAL at 07:50

## 2022-01-12 RX ADMIN — HYDRALAZINE HYDROCHLORIDE 25 MG: 50 TABLET, FILM COATED ORAL at 21:00

## 2022-01-12 RX ADMIN — LEVOTHYROXINE SODIUM 25 MCG: 25 TABLET ORAL at 09:07

## 2022-01-12 RX ADMIN — HYDROCODONE BITARTRATE AND ACETAMINOPHEN 1 TABLET: 10; 325 TABLET ORAL at 21:04

## 2022-01-12 RX ADMIN — LACTULOSE 20 G: 10 SOLUTION ORAL at 16:24

## 2022-01-12 RX ADMIN — GABAPENTIN 300 MG: 300 CAPSULE ORAL at 20:58

## 2022-01-12 RX ADMIN — ROPINIROLE HYDROCHLORIDE 0.5 MG: 0.25 TABLET, FILM COATED ORAL at 09:08

## 2022-01-12 RX ADMIN — SODIUM CHLORIDE, PRESERVATIVE FREE 10 ML: 5 INJECTION INTRAVENOUS at 09:08

## 2022-01-12 RX ADMIN — INSULIN ASPART 2 UNITS: 100 INJECTION, SOLUTION INTRAVENOUS; SUBCUTANEOUS at 07:50

## 2022-01-12 RX ADMIN — HEPARIN SODIUM 5000 UNITS: 5000 INJECTION INTRAVENOUS; SUBCUTANEOUS at 06:19

## 2022-01-12 NOTE — PROGRESS NOTES
[facility] HOSPITALIST PROGRESS NOTE     Patient Identification:  Name:  Reny Elena  Age:  61 y.o.  Sex:  female  :  1960  MRN:  [id number]  Visit Number:  [id number]  ROOM: [address] [place] [address]     Primary Care Provider:  Susan Stephens APRN    Length of stay in inpatient status:  1    Subjective     Chief Compliant:    Chief Complaint   Patient presents with   • Hypertension   • Chronic Renal Failure       History of Presenting Illness: Patient seen and evaluated in follow-up for hypertensive emergency with acute hyperkalemia in the setting of CKD stage V with recent peritoneal dialysis catheter placement with plans to begin peritoneal dialysis in the coming weeks.  Patient this morning somnolent and lethargic and noted to have hypoglycemia this morning and long-acting insulin reduced.  ABG obtained did show primary respiratory acidosis and will use BiPAP at night for now.  Patient overall throughout the day that with him overall improving clinical progress.  Patient also with acute decline in hemoglobin and transfused 1 unit of packed red blood cells.    Objective     Current Hospital Meds:calcitriol, 0.25 mcg, Oral, Daily  calcium acetate, 1,334 mg, Oral, TID With Meals  carvedilol, 12.5 mg, Oral, BID With Meals  cetirizine, 5 mg, Oral, Daily  cloNIDine, 0.1 mg, Oral, BID  clopidogrel, 75 mg, Oral, Daily  gabapentin, 300 mg, Oral, Nightly  heparin (porcine), 5,000 Units, Subcutaneous, Q8H  hydrALAZINE, 50 mg, Oral, Q12H  insulin aspart, 0-7 Units, Subcutaneous, TID AC  insulin detemir, 10 Units, Subcutaneous, Nightly  isosorbide mononitrate, 30 mg, Oral, Daily  lactulose, 20 g, Oral, TID  levothyroxine, 25 mcg, Oral, Daily  pantoprazole, 40 mg, Oral, Daily  pravastatin, 20 mg, Oral, Daily  rOPINIRole, 0.5 mg, Oral, BID  sodium chloride, 10 mL, Intravenous, Q12H  traZODone, 100 mg, Oral, Nightly    sodium chloride, 75 mL/hr, Last Rate: 75 mL/hr (22 0752)        Current Antimicrobial  Therapy:  Anti-Infectives (From admission, onward)    None        Current Diuretic Therapy:  Diuretics (From admission, onward)    None        ----------------------------------------------------------------------------------------------------------------------  Vital Signs:  Temp:  [96.9 °F (36.1 °C)-99 °F (37.2 °C)] 98.8 °F (37.1 °C)  Heart Rate:  [60-95] 78  Resp:  [16-20] 20  BP: ()/(45-68) 93/45  SpO2:  [94 %-99 %] 96 %  on   ;   Device (Oxygen Therapy): room air  Body mass index is 20.8 kg/m².    Wt Readings from Last 3 Encounters:   01/11/22 56.7 kg (125 lb)   12/16/21 56.7 kg (125 lb)   12/09/21 57.6 kg (127 lb)     Intake & Output (last 3 days)       01/09 0701  01/10 0700 01/10 0701  01/11 0700 01/11 0701  01/12 0700    P.O.  1040 480    Blood   900    IV Piggyback 100 500     Total Intake(mL/kg) 100 (1.8) 1540 (27.2) 1380 (24.3)    Urine (mL/kg/hr)  400 (0.3)     Stool  0     Total Output  400     Net +100 +1140 +1380           Urine Unmeasured Occurrence  2 x     Stool Unmeasured Occurrence  1 x         Diet Regular; Consistent Carbohydrate, Cardiac  ----------------------------------------------------------------------------------------------------------------------  Physical exam:  Constitutional:  Well-developed and well-nourished.  No respiratory distress.      HENT:  Head: Normocephalic and atraumatic.  Mouth:  Moist mucous membranes.    Eyes:  Conjunctivae and EOM are normal.  Pupils are equal, round, and reactive to light.  No scleral icterus.  Cardiovascular:  Normal rate, regular rhythm and normal heart sounds with no murmur.  Pulmonary/Chest:  No respiratory distress, no wheezes, no crackles, with normal breath sounds and good air movement.  Abdominal:  Soft.  Bowel sounds are normal.  No distension and no tenderness.  There is a peritoneal dialysis catheter present in the right lower quadrant.  Musculoskeletal:  No edema, no tenderness, and no deformity.  No red or swollen joints  anywhere.    Neurological:  Alert and oriented to person, place, and time.  No cranial nerve deficit.    Skin:  Skin is warm and dry.  No rash noted.  No pallor.   Peripheral vascular:  No edema and pulses on all 4 extremities.  ----------------------------------------------------------------------------------------------------------------------  Tele:    ----------------------------------------------------------------------------------------------------------------------  Results from last 7 days   Lab Units 01/11/22  1742 01/11/22  0825 01/11/22  0325 01/10/22  0729 01/10/22  0729 01/09/22  1802 01/09/22  1802   CRP mg/dL  --   --   --   --  <0.30  --  <0.30   WBC 10*3/mm3  --   --  5.19  --  6.32  --  5.82   HEMOGLOBIN g/dL 7.9* 6.7* 6.6*   < > 8.5*   < > 9.1*   HEMATOCRIT % 26.0* 21.8* 21.0*   < > 27.4*   < > 29.1*   MCV fL  --   --  97.7*  --  97.9*  --  95.4   MCHC g/dL  --   --  31.4*  --  31.0*  --  31.3*   PLATELETS 10*3/mm3  --   --  115*  --  170  --  181    < > = values in this interval not displayed.     Results from last 7 days   Lab Units 01/11/22  1008   PH, ARTERIAL pH units 7.210*   PO2 ART mm Hg 73.1*   PCO2, ARTERIAL mm Hg 50.9*   HCO3 ART mmol/L 20.4     Results from last 7 days   Lab Units 01/11/22  0140 01/10/22  1602 01/10/22  0729 01/10/22  0036 01/09/22  1802   SODIUM mmol/L 138 137 136   < > 135*   POTASSIUM mmol/L 4.6 5.8* 5.5*   < > 6.2*   MAGNESIUM mg/dL  --   --   --   --  2.0   CHLORIDE mmol/L 109* 108* 106   < > 105   CO2 mmol/L 17.0* 14.9* 15.6*   < > 17.0*   BUN mg/dL 65* 65* 67*   < > 67*   CREATININE mg/dL 4.89* 4.97* 5.09*   < > 4.75*   EGFR IF NONAFRICN AM mL/min/1.73 9* 9* 9*   < > 9*   CALCIUM mg/dL 7.7* 8.5* 9.1   < > 8.7   GLUCOSE mg/dL 111* 216* 244*   < > 307*   ALBUMIN g/dL 2.96*  --  3.88  --  4.02   BILIRUBIN mg/dL 0.2  --  0.2  --  0.2   ALK PHOS U/L 64  --  82  --  98   AST (SGOT) U/L 9  --  11  --  12   ALT (SGPT) U/L 7  --  10  --  10    < > = values in this  interval not displayed.   Estimated Creatinine Clearance: 10.5 mL/min (A) (by C-G formula based on SCr of 4.89 mg/dL (H)).  No results found for: AMMONIA  Results from last 7 days   Lab Units 01/10/22  0729 01/09/22  2000 01/09/22  1802   TROPONIN T ng/mL 0.050* 0.039* 0.041*             Glucose   Date/Time Value Ref Range Status   01/11/2022 1839 297 (H) 70 - 130 mg/dL Final     Comment:     RN Notified Meter: [id number] : [id number] HEMANT WILLIAMSON   01/11/2022 1716 241 (H) 70 - 130 mg/dL Final     Comment:     Meter: [id number] : [id number] RA RAPP   01/11/2022 0938 162 (H) 70 - 130 mg/dL Final     Comment:     Meter: [id number] : [id number] RA RAPP   01/11/2022 0916 167 (H) 70 - 130 mg/dL Final     Comment:     Meter: [id number] : [id number] RA RPAP   01/11/2022 0851 177 (H) 70 - 130 mg/dL Final     Comment:     Meter: [id number] : [id number] ALY ROSALES   01/11/2022 0741 254 (H) 70 - 130 mg/dL Final     Comment:     Meter: [id number] : [id number] RA RAPP   01/11/2022 0716 54 (L) 70 - 130 mg/dL Final     Comment:     Meter: [id number] : [id number] ALY ROSALES   01/10/2022 2107 341 (H) 70 - 130 mg/dL Final     Comment:     Meter: [id number] : [id number] ERON TRINO     Lab Results   Component Value Date    TSH 6.100 (H) 01/09/2022    FREET4 1.03 01/09/2022     No results found for: PREGTESTUR, PREGSERUM, HCG, HCGQUANT  Pain Management Panel     Pain Management Panel Latest Ref Rng & Units 11/6/2021 11/6/2021    CREATININE UR mg/dL 24.4 24.4    AMPHETAMINES SCREEN, URINE Negative - -    BARBITURATES SCREEN Negative - -    BENZODIAZEPINE SCREEN, URINE Negative - -    BUPRENORPHINEUR Negative - -    COCAINE SCREEN, URINE Negative - -    METHADONE SCREEN, URINE Negative - -    METHAMPHETAMINEUR Negative - -        Brief Urine Lab Results  (Last result in the past 365 days)      Color   Clarity   Blood   Leuk Est   Nitrite   Protein   CREAT   Urine HCG         11/06/21 1626             24.4         11/06/21 1626             24.4             No results found for: BLOODCX  No results found for: URINECX  No results found for: WOUNDCX  No results found for: STOOLCX  No results found for: RESPCX  No results found for: AFBCX  Results from last 7 days   Lab Units 01/10/22  0729 01/09/22  1802   SED RATE mm/hr 23  --    CRP mg/dL <0.30 <0.30       I have personally looked at the labs and they are summarized above.  ----------------------------------------------------------------------------------------------------------------------  Detailed radiology reports for the last 24 hours:    Imaging Results (Last 24 Hours)     ** No results found for the last 24 hours. **        Assessment & Plan      Acute normocytic anemia    -Patient with history of anemia of chronic disease with noted acute decline in hemoglobin on recheck this morning.  Patient did receive IV fluids at initial presentation however hemoglobin less than 7 and therefore transfused 1 unit with appropriate response.    -Suspect related to progression of her chronic renal disease with reduced erythroid sánchez.    -Repeat CBC in a.m.    -Threshold to transfuse for hemoglobin less than 7.  Consideration of EPO at nephrology's discretion.    Hypertensive emergency    -Blood pressure elevated with systolics greater than 230 at presentation now improved after receiving IV antihypertensives and being started on her home medications in the emergency department.    -Continue home antihypertensives however of note patient on 50 of hydralazine twice daily and will reduce this to 25 3 times daily for smoother hypertensive control.     Acute hyperkalemia  CKD stage V  Status post PD catheter placement, not yet initiated    -Patient with improvement in potassium after administration of lactulose and potassium binder and insulin.    -Nephrology consulted, appreciate input    -Repeat CMP in the a.m.    -Continue normal saline however  will reduce rate to 75 cc an hour given patient's history of heart failure    Acute respiratory acidosis  CO2 narcosis    -Patient earlier this morning with hypoglycemia but also after administration of D50 50 still with recurrent episode of somnolence and ABG revealed primary respiratory acidosis with elevated CO2 of 51 with pH 7.2.  Patient denies any history of sleep apnea however will place on BiPAP at night and monitor.     Diabetes mellitus type 2     -Basal and bolus insulin to maintain target glucose of 140-180     NSTEMI, type II    -Patient with accelerated hypertension and chronic kidney disease with overall fairly unimpressive rise in troponin with flat trend    -Patient chest pain-free at this time    -EKG without any ischemic changes    -Continue to monitor on telemetry and for any complaints of chest pain.     Heart failure with reduced ejection fraction    -Patient appears euvolemic on exam    -Patient most recent echo in November 2021 reviewed, shows evidence of recovery after previous EF of 46 to 50% now reading greater than 60%.    -Cautious IV fluid administration given heart failure.     History of seizure disorder    -Patient currently not on any antiepileptics, continue to monitor for now     Anxiety  Depression    -Supportive care and continue home medications.      VTE Prophylaxis:   Mechanical Order History:     None      Pharmalogical Order History:      Ordered     Dose Route Frequency Stop    01/10/22 1812  heparin (porcine) 5000 UNIT/ML injection 5,000 Units         5,000 Units SC Every 8 Hours Scheduled --                Disposition Home once medically stable and improved.    Emanuel Hartley DO  [facility] Hospitalist  01/11/22  19:38 EST

## 2022-01-12 NOTE — PLAN OF CARE
Goal Outcome Evaluation:    Patient is resting in bed with no s/s of distress. Possible discharge in the AM pending H&H. Will continue to monitor.

## 2022-01-12 NOTE — CONSULTS
Nephrology Consult Note    Referring Provider: Dr. Hartley  Reason for Consultation: CKD, HORACE    Subjective       History of present illness:  Reny Elena is a 61 y.o. female who presented to [facility] emergency department with chief complaint of not feeling well and having elevated BP.   Pt is known to have hypertension, hyperlipidemia, combined systolic and diastolic heart failure, diabetes mellitus type 2, hypothyroidism, seizure disorder, GERD and anxiety/depression. She has s/p PDx cath placed and is scheduled to get PD training on Monday to start dialysis.   She denied any chest pain, shortness of breath, sleep difficulties or  Appetite change. Pt feels relatively better since she has been admitted to hospital Patient states that she has not been feeling well for the last 24 to 48 hours with nausea and abdominal discomfort  Pt also denied any history of Chronic NSAIDS use. Patient denies hematuria, dysuria, difficulty passing urine. No prior history of renal stones. No family history of renal disease    History  Past Medical History:   Diagnosis Date   • Arthritis    • Closed fracture of right fibula and tibia 2018   • Depression    • Diabetic ulcer of left foot associated with type 2 diabetes mellitus (HCC) 2017   • Diastolic dysfunction    • Disease of thyroid gland    • Elevated cholesterol    • End stage renal disease ([facility])    • Essential hypertension    • GERD (gastroesophageal reflux disease)    • History of transfusion    • Hyperlipidemia    • Iron deficiency anemia 2018   • PAD (peripheral artery disease) ([facility])    • Renal failure    • Type 2 diabetes mellitus with hyperglycemia, with long-term current use of insulin ([facility]) 2018   ,   Past Surgical History:   Procedure Laterality Date   • ABDOMINAL SURGERY     • BACK SURGERY      Post spinal diskectomy, osteophytectomy in one lumbar interspace   • CATARACT EXTRACTION      Left    •  SECTION     • DENTAL  PROCEDURE     • ENDOSCOPY     • FRACTURE SURGERY      right leg   • HYSTERECTOMY     • INCISION AND DRAINAGE LEG Left 4/15/2017    Procedure: INCISION AND DRAINAGE LOWER EXTREMITY;  Surgeon: Basilio Morris MD;  Location: [facility] OR;  Service:    • INCISION AND DRAINAGE LEG Left 5/26/2017    Procedure: Irrigation and Debridment abcess diabetic wound left foot with deep culture;  Surgeon: Basilio Morris MD;  Location: [facility] OR;  Service:    • INSERTION PERITONEAL DIALYSIS CATHETER N/A 12/16/2021    Procedure: INSERTION PERITONEAL DIALYSIS CATHETER LAPAROSCOPIC;  Surgeon: Edy Glover MD;  Location: [facility] OR;  Service: General;  Laterality: N/A;   • KNEE ARTHROSCOPY Right    • ORIF TIBIA FRACTURE Right 12/28/2018    Procedure: TIBIAL  FRACTURE OPEN REDUCTION INTERNAL FIXATION;  Surgeon: Jung Barragan MD;  Location: [facility] OR;  Service: Orthopedics   • TOE AMPUTATION Right     5th   • TUBAL ABDOMINAL LIGATION     ,   Family History   Problem Relation Age of Onset   • Heart disease Mother    • Cancer Mother    • COPD Mother    • Diabetes Other    • Depression Other    ,   Social History     Tobacco Use   • Smoking status: Never Smoker   • Smokeless tobacco: Never Used   Vaping Use   • Vaping Use: Never used   Substance Use Topics   • Alcohol use: No   • Drug use: No   ,   Medications Prior to Admission   Medication Sig Dispense Refill Last Dose   • calcitriol (ROCALTROL) 0.25 MCG capsule Take 0.25 mcg by mouth Daily.   1/9/2022 at Unknown time   • calcium acetate (PHOS BINDER,) 667 MG capsule capsule Take 1,334 mg by mouth 3 (Three) Times a Day With Meals.   1/9/2022 at Unknown time   • carvedilol (COREG) 12.5 MG tablet Take 12.5 mg by mouth 2 (Two) Times a Day With Meals.   1/9/2022 at Unknown time   • cetirizine (zyrTEC) 10 MG tablet Take 10 mg by mouth Daily.   1/9/2022 at Unknown time   • cloNIDine (CATAPRES) 0.1 MG tablet Take 0.1 mg by mouth 2 (Two) Times a Day.   1/9/2022 at Unknown time   • clopidogrel  (PLAVIX) 75 MG tablet Take 1 tablet by mouth Daily. 30 tablet 4 1/9/2022 at Unknown time   • fluticasone (FLONASE) 50 MCG/ACT nasal spray 2 sprays into the nostril(s) as directed by provider Daily As Needed for Allergies.   1/9/2022 at Unknown time   • gabapentin (NEURONTIN) 600 MG tablet Take 0.5 tablets by mouth Every Night.   1/9/2022 at Unknown time   • hydrALAZINE (APRESOLINE) 50 MG tablet Take 50 mg by mouth Every 12 (Twelve) Hours.   1/9/2022 at Unknown time   • insulin aspart (novoLOG) 100 UNIT/ML injection Inject 3 Units under the skin into the appropriate area as directed 3 (Three) Times a Day Before Meals.   1/9/2022 at Unknown time   • insulin glargine (LANTUS, SEMGLEE) 100 UNIT/ML injection Inject 10 Units under the skin into the appropriate area as directed 2 (Two) Times a Day.   1/9/2022 at Unknown time   • isosorbide mononitrate (IMDUR) 30 MG 24 hr tablet Take 30 mg by mouth Daily.   1/9/2022 at Unknown time   • levothyroxine (SYNTHROID, LEVOTHROID) 25 MCG tablet Take 25 mcg by mouth Daily.   1/9/2022 at Unknown time   • pantoprazole (PROTONIX) 40 MG EC tablet Take 40 mg by mouth Daily.   1/9/2022 at Unknown time   • pravastatin (PRAVACHOL) 20 MG tablet Take 20 mg by mouth Daily.   1/9/2022 at Unknown time   • rOPINIRole (REQUIP) 0.5 MG tablet Take 0.5 mg by mouth 2 (Two) Times a Day.   1/9/2022 at Unknown time   • traZODone (DESYREL) 100 MG tablet Take 1 tablet by mouth Every Night. 10 tablet 0 Past Week at Unknown time   • HYDROcodone-acetaminophen (NORCO)  MG per tablet Take 1 tablet by mouth Every 8 (Eight) Hours As Needed for Mild Pain . Pharmacy directions states every 4 to 6 hours prn pain.   Unknown at Unknown time   • hydrOXYzine (ATARAX) 25 MG tablet Take 1 tablet by mouth 3 (Three) Times a Day As Needed for Anxiety. 15 tablet 0 Unknown at Unknown time   • tiZANidine (ZANAFLEX) 4 MG tablet Take 4 mg by mouth Every 6 (Six) Hours As Needed for Muscle Spasms.   Unknown at Unknown time    , Scheduled Meds:  calcitriol, 0.25 mcg, Oral, Daily  calcium acetate, 1,334 mg, Oral, TID With Meals  carvedilol, 12.5 mg, Oral, BID With Meals  cetirizine, 5 mg, Oral, Daily  cloNIDine, 0.1 mg, Oral, BID  clopidogrel, 75 mg, Oral, Daily  gabapentin, 300 mg, Oral, Nightly  heparin (porcine), 5,000 Units, Subcutaneous, Q8H  hydrALAZINE, 25 mg, Oral, Q8H  insulin aspart, 0-7 Units, Subcutaneous, TID AC  insulin detemir, 15 Units, Subcutaneous, Nightly  isosorbide mononitrate, 30 mg, Oral, Daily  lactulose, 20 g, Oral, TID  levothyroxine, 25 mcg, Oral, Daily  pantoprazole, 40 mg, Oral, Daily  pravastatin, 20 mg, Oral, Daily  rOPINIRole, 0.5 mg, Oral, BID  sodium chloride, 10 mL, Intravenous, Q12H  traZODone, 100 mg, Oral, Nightly    , Continuous Infusions:  sodium chloride, 75 mL/hr, Last Rate: 75 mL/hr (01/12/22 0152)    , PRN Meds:  •  acetaminophen  •  dextrose  •  dextrose  •  fluticasone  •  glucagon (human recombinant)  •  HYDROcodone-acetaminophen  •  hydrOXYzine  •  ondansetron  •  sodium chloride and Allergies:  Penicillins, Codeine, and Sulfa antibiotics    Review of Systems  More than 10 point review of systems was done. Pertinent items are noted in HPI, all other systems reviewed and negative    Objective     Vital Signs  Temp:  [97.6 °F (36.4 °C)-99 °F (37.2 °C)] 97.6 °F (36.4 °C)  Heart Rate:  [68-95] 70  Resp:  [18-20] 18  BP: ()/(45-74) 130/63    I/O this shift:  In: 240 [P.O.:240]  Out: -   I/O last 3 completed shifts:  In: 2760 [P.O.:1860; Blood:900]  Out: 750 [Urine:750]    Physical Examination:  General Appearance: not in acute distress  No JVD  Lungs: Clear to auscultation, respirations regular and unlabored  Heart: Regular rhythm & normal rate, normal S1, S2, no murmur, no gallop, no rub   Abdomen: Normal bowel sounds, no masses and soft non-tender  Extremities: no edema  Pulses: Palpable and equal bilaterally  Neurologic: Orientated to person, place, time, grossly no focal  deficitis    Laboratory Data :      WBC WBC   Date Value Ref Range Status   01/12/2022 3.87 3.40 - 10.80 10*3/mm3 Final   01/11/2022 5.19 3.40 - 10.80 10*3/mm3 Final   01/10/2022 6.32 3.40 - 10.80 10*3/mm3 Final   01/09/2022 5.82 3.40 - 10.80 10*3/mm3 Final      HGB Hemoglobin   Date Value Ref Range Status   01/12/2022 7.3 (L) 12.0 - 15.9 g/dL Final   01/11/2022 7.9 (L) 12.0 - 15.9 g/dL Final   01/11/2022 6.7 (C) 12.0 - 15.9 g/dL Final   01/11/2022 6.6 (C) 12.0 - 15.9 g/dL Final   01/10/2022 8.5 (L) 12.0 - 15.9 g/dL Final   01/09/2022 9.1 (L) 12.0 - 15.9 g/dL Final      HCT Hematocrit   Date Value Ref Range Status   01/12/2022 23.3 (L) 34.0 - 46.6 % Final   01/11/2022 26.0 (L) 34.0 - 46.6 % Final   01/11/2022 21.8 (L) 34.0 - 46.6 % Final   01/11/2022 21.0 (L) 34.0 - 46.6 % Final   01/10/2022 27.4 (L) 34.0 - 46.6 % Final   01/09/2022 29.1 (L) 34.0 - 46.6 % Final      Platlets No results found for: LABPLAT   MCV MCV   Date Value Ref Range Status   01/12/2022 96.7 79.0 - 97.0 fL Final   01/11/2022 97.7 (H) 79.0 - 97.0 fL Final   01/10/2022 97.9 (H) 79.0 - 97.0 fL Final   01/09/2022 95.4 79.0 - 97.0 fL Final          Sodium Sodium   Date Value Ref Range Status   01/12/2022 133 (L) 136 - 145 mmol/L Final   01/11/2022 138 136 - 145 mmol/L Final   01/10/2022 137 136 - 145 mmol/L Final   01/10/2022 136 136 - 145 mmol/L Final   01/10/2022 138 136 - 145 mmol/L Final   01/10/2022 136 136 - 145 mmol/L Final   01/09/2022 135 (L) 136 - 145 mmol/L Final      Potassium Potassium   Date Value Ref Range Status   01/12/2022 4.7 3.5 - 5.2 mmol/L Final   01/11/2022 4.6 3.5 - 5.2 mmol/L Final   01/10/2022 5.8 (H) 3.5 - 5.2 mmol/L Final     Comment:     Slight hemolysis detected by analyzer. Results may be affected.   01/10/2022 5.5 (H) 3.5 - 5.2 mmol/L Final   01/10/2022 5.2 3.5 - 5.2 mmol/L Final   01/10/2022 6.2 (C) 3.5 - 5.2 mmol/L Final   01/09/2022 6.2 (C) 3.5 - 5.2 mmol/L Final      Chloride Chloride   Date Value Ref Range Status    01/12/2022 104 98 - 107 mmol/L Final   01/11/2022 109 (H) 98 - 107 mmol/L Final   01/10/2022 108 (H) 98 - 107 mmol/L Final   01/10/2022 106 98 - 107 mmol/L Final   01/10/2022 108 (H) 98 - 107 mmol/L Final   01/10/2022 106 98 - 107 mmol/L Final   01/09/2022 105 98 - 107 mmol/L Final      CO2 CO2   Date Value Ref Range Status   01/12/2022 17.9 (L) 22.0 - 29.0 mmol/L Final   01/11/2022 17.0 (L) 22.0 - 29.0 mmol/L Final   01/10/2022 14.9 (L) 22.0 - 29.0 mmol/L Final   01/10/2022 15.6 (L) 22.0 - 29.0 mmol/L Final   01/10/2022 17.7 (L) 22.0 - 29.0 mmol/L Final   01/10/2022 17.6 (L) 22.0 - 29.0 mmol/L Final   01/09/2022 17.0 (L) 22.0 - 29.0 mmol/L Final      BUN BUN   Date Value Ref Range Status   01/12/2022 58 (H) 8 - 23 mg/dL Final   01/11/2022 65 (H) 8 - 23 mg/dL Final   01/10/2022 65 (H) 8 - 23 mg/dL Final   01/10/2022 67 (H) 8 - 23 mg/dL Final   01/10/2022 70 (H) 8 - 23 mg/dL Final   01/10/2022 69 (H) 8 - 23 mg/dL Final   01/09/2022 67 (H) 8 - 23 mg/dL Final      Creatinine Creatinine   Date Value Ref Range Status   01/12/2022 4.60 (H) 0.57 - 1.00 mg/dL Final   01/11/2022 4.89 (H) 0.57 - 1.00 mg/dL Final   01/10/2022 4.97 (H) 0.57 - 1.00 mg/dL Final   01/10/2022 5.09 (H) 0.57 - 1.00 mg/dL Final   01/10/2022 5.06 (H) 0.57 - 1.00 mg/dL Final   01/10/2022 4.95 (H) 0.57 - 1.00 mg/dL Final   01/09/2022 4.75 (H) 0.57 - 1.00 mg/dL Final      Calcium Calcium   Date Value Ref Range Status   01/12/2022 7.7 (L) 8.6 - 10.5 mg/dL Final   01/11/2022 7.7 (L) 8.6 - 10.5 mg/dL Final   01/10/2022 8.5 (L) 8.6 - 10.5 mg/dL Final   01/10/2022 9.1 8.6 - 10.5 mg/dL Final   01/10/2022 8.9 8.6 - 10.5 mg/dL Final   01/10/2022 8.3 (L) 8.6 - 10.5 mg/dL Final   01/09/2022 8.7 8.6 - 10.5 mg/dL Final      PO4 No results found for: CAPO4   Albumin Albumin   Date Value Ref Range Status   01/11/2022 2.96 (L) 3.50 - 5.20 g/dL Final   01/10/2022 3.88 3.50 - 5.20 g/dL Final   01/09/2022 4.02 3.50 - 5.20 g/dL Final      Magnesium Magnesium   Date Value  Ref Range Status   01/09/2022 2.0 1.6 - 2.4 mg/dL Final      Uric Acid No results found for: URICACID     Radiology results :     Imaging Results (Last 72 Hours)     ** No results found for the last 72 hours. **            Medications:      calcitriol, 0.25 mcg, Oral, Daily  calcium acetate, 1,334 mg, Oral, TID With Meals  carvedilol, 12.5 mg, Oral, BID With Meals  cetirizine, 5 mg, Oral, Daily  cloNIDine, 0.1 mg, Oral, BID  clopidogrel, 75 mg, Oral, Daily  gabapentin, 300 mg, Oral, Nightly  heparin (porcine), 5,000 Units, Subcutaneous, Q8H  hydrALAZINE, 25 mg, Oral, Q8H  insulin aspart, 0-7 Units, Subcutaneous, TID AC  insulin detemir, 15 Units, Subcutaneous, Nightly  isosorbide mononitrate, 30 mg, Oral, Daily  lactulose, 20 g, Oral, TID  levothyroxine, 25 mcg, Oral, Daily  pantoprazole, 40 mg, Oral, Daily  pravastatin, 20 mg, Oral, Daily  rOPINIRole, 0.5 mg, Oral, BID  sodium chloride, 10 mL, Intravenous, Q12H  traZODone, 100 mg, Oral, Nightly      sodium chloride, 75 mL/hr, Last Rate: 75 mL/hr (01/12/22 0152)        Assessment/Plan       Hyperkalemia    1. 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 Anemia of chronic disease  3. Uncontrolled hypertension  4. DM-II    Pt has been scheduled to start for training from Monday, she does not have any emergent indication for dialysis right now  She can be discharged from [facility] and will start on Pd from Monday.   Pt's oral intake is better, BP is more stable and controlled, DC IVF.    Thanks Dr Hartley for the consult. Nephrology will follow the patient.   I discussed the patient's findings and my recommendations with patient and nursing staff    Nicholas Arroyo MD  01/12/22  11:35 EST

## 2022-01-12 NOTE — CASE MANAGEMENT/SOCIAL WORK
Discharge Planning Assessment   Jose Alfredo     Patient Name: Reny Elena  MRN: [id number]  Today's Date: 1/12/2022    Admit Date: 1/9/2022     Discharge Needs Assessment    No documentation.                Discharge Plan     Row Name 01/12/22 1038       Plan    Plan MD made aware of pt request for new glucometer at [place], MD agreeable to provide order.              Continued Care and Services - Admitted Since 1/9/2022    Coordination has not been started for this encounter.       Expected Discharge Date and Time     Expected Discharge Date Expected Discharge Time    Jan 14, 2022          Demographic Summary    No documentation.                Functional Status    No documentation.                Psychosocial    No documentation.                Abuse/Neglect    No documentation.                Legal    No documentation.                Substance Abuse    No documentation.                Patient Forms    No documentation.                   Ame Yuen RN

## 2022-01-12 NOTE — PLAN OF CARE
Goal Outcome Evaluation:   Patient resting well during shift. She has complained of lower extremity pain. PRN medications given as ordered. Patient compliant with wearing BIPAP during the night. No distress noted will continue to follow plan of care.

## 2022-01-12 NOTE — CASE MANAGEMENT/SOCIAL WORK
Discharge Planning Assessment   Jose Alfredo     Patient Name: Reny Elena  MRN: [id number]  Today's Date: 1/12/2022    Admit Date: 1/9/2022     Discharge Needs Assessment    No documentation.                Discharge Plan     Row Name 01/12/22 1004       Plan    Plan CM spoke with pt via phone. She is pleasant to speak with. Pt lives at home with sig.other and plans to return back home when stable for dc. She has  BSC, st cane/RW at home. Pt reports she has Dexcom but it isnt compatible with her mobile device, as it is old and she cant afford a new one right now. She is requesting a new glucometer for home, if possible. Pt reports having been out of bed, ambulating to BR. She Plans to return back home when stable for dc with family providing transportation. CM will follow & assist as needed.              Continued Care and Services - Admitted Since 1/9/2022    Coordination has not been started for this encounter.       Expected Discharge Date and Time     Expected Discharge Date Expected Discharge Time    Jan 14, 2022          Demographic Summary    No documentation.                Functional Status    No documentation.                Psychosocial    No documentation.                Abuse/Neglect    No documentation.                Legal    No documentation.                Substance Abuse    No documentation.                Patient Forms    No documentation.                   Ame Yuen, GLORIA

## 2022-01-12 NOTE — NURSING NOTE
Flushed peritoneal dialysis catheter with approximately 500cc total  peritioneal fluid.  Fluid came back clear.  Patient tolerated  Well.

## 2022-01-13 LAB
ANION GAP SERPL CALCULATED.3IONS-SCNC: 10.6 MMOL/L (ref 5–15)
BUN SERPL-MCNC: 55 MG/DL (ref 8–23)
BUN/CREAT SERPL: 12.4 (ref 7–25)
CALCIUM SPEC-SCNC: 7.8 MG/DL (ref 8.6–10.5)
CHLORIDE SERPL-SCNC: 104 MMOL/L (ref 98–107)
CO2 SERPL-SCNC: 18.4 MMOL/L (ref 22–29)
CREAT SERPL-MCNC: 4.43 MG/DL (ref 0.57–1)
DEPRECATED RDW RBC AUTO: 52.8 FL (ref 37–54)
ERYTHROCYTE [DISTWIDTH] IN BLOOD BY AUTOMATED COUNT: 14.7 % (ref 12.3–15.4)
GFR SERPL CREATININE-BSD FRML MDRD: 10 ML/MIN/1.73
GFR SERPL CREATININE-BSD FRML MDRD: ABNORMAL ML/MIN/{1.73_M2}
GLUCOSE BLDC GLUCOMTR-MCNC: 209 MG/DL (ref 70–130)
GLUCOSE BLDC GLUCOMTR-MCNC: 214 MG/DL (ref 70–130)
GLUCOSE BLDC GLUCOMTR-MCNC: 234 MG/DL (ref 70–130)
GLUCOSE BLDC GLUCOMTR-MCNC: 276 MG/DL (ref 70–130)
GLUCOSE BLDC GLUCOMTR-MCNC: 329 MG/DL (ref 70–130)
GLUCOSE BLDC GLUCOMTR-MCNC: 61 MG/DL (ref 70–130)
GLUCOSE SERPL-MCNC: 285 MG/DL (ref 65–99)
HCT VFR BLD AUTO: 24 % (ref 34–46.6)
HGB BLD-MCNC: 7.4 G/DL (ref 12–15.9)
MCH RBC QN AUTO: 30 PG (ref 26.6–33)
MCHC RBC AUTO-ENTMCNC: 30.8 G/DL (ref 31.5–35.7)
MCV RBC AUTO: 97.2 FL (ref 79–97)
PLATELET # BLD AUTO: 98 10*3/MM3 (ref 140–450)
PMV BLD AUTO: 10.7 FL (ref 6–12)
POTASSIUM SERPL-SCNC: 5.2 MMOL/L (ref 3.5–5.2)
POTASSIUM SERPL-SCNC: 5.9 MMOL/L (ref 3.5–5.2)
QT INTERVAL: 410 MS
QTC INTERVAL: 445 MS
RBC # BLD AUTO: 2.47 10*6/MM3 (ref 3.77–5.28)
SODIUM SERPL-SCNC: 133 MMOL/L (ref 136–145)
WBC NRBC COR # BLD: 4.62 10*3/MM3 (ref 3.4–10.8)

## 2022-01-13 PROCEDURE — 85027 COMPLETE CBC AUTOMATED: CPT | Performed by: STUDENT IN AN ORGANIZED HEALTH CARE EDUCATION/TRAINING PROGRAM

## 2022-01-13 PROCEDURE — 84132 ASSAY OF SERUM POTASSIUM: CPT | Performed by: PHYSICIAN ASSISTANT

## 2022-01-13 PROCEDURE — 82962 GLUCOSE BLOOD TEST: CPT

## 2022-01-13 PROCEDURE — 93010 ELECTROCARDIOGRAM REPORT: CPT | Performed by: INTERNAL MEDICINE

## 2022-01-13 PROCEDURE — 99232 SBSQ HOSP IP/OBS MODERATE 35: CPT | Performed by: STUDENT IN AN ORGANIZED HEALTH CARE EDUCATION/TRAINING PROGRAM

## 2022-01-13 PROCEDURE — 63710000001 INSULIN REGULAR HUMAN PER 5 UNITS: Performed by: PHYSICIAN ASSISTANT

## 2022-01-13 PROCEDURE — 80048 BASIC METABOLIC PNL TOTAL CA: CPT | Performed by: STUDENT IN AN ORGANIZED HEALTH CARE EDUCATION/TRAINING PROGRAM

## 2022-01-13 PROCEDURE — 94660 CPAP INITIATION&MGMT: CPT

## 2022-01-13 PROCEDURE — 63710000001 INSULIN DETEMIR PER 5 UNITS: Performed by: STUDENT IN AN ORGANIZED HEALTH CARE EDUCATION/TRAINING PROGRAM

## 2022-01-13 PROCEDURE — 93005 ELECTROCARDIOGRAM TRACING: CPT | Performed by: PHYSICIAN ASSISTANT

## 2022-01-13 PROCEDURE — 63710000001 INSULIN ASPART PER 5 UNITS: Performed by: STUDENT IN AN ORGANIZED HEALTH CARE EDUCATION/TRAINING PROGRAM

## 2022-01-13 PROCEDURE — 25010000002 HEPARIN (PORCINE) PER 1000 UNITS: Performed by: STUDENT IN AN ORGANIZED HEALTH CARE EDUCATION/TRAINING PROGRAM

## 2022-01-13 RX ORDER — DEXTROSE MONOHYDRATE 25 G/50ML
50 INJECTION, SOLUTION INTRAVENOUS ONCE
Status: COMPLETED | OUTPATIENT
Start: 2022-01-13 | End: 2022-01-13

## 2022-01-13 RX ADMIN — INSULIN DETEMIR 15 UNITS: 100 INJECTION, SOLUTION SUBCUTANEOUS at 23:09

## 2022-01-13 RX ADMIN — CARVEDILOL 12.5 MG: 6.25 TABLET, FILM COATED ORAL at 08:34

## 2022-01-13 RX ADMIN — HYDRALAZINE HYDROCHLORIDE 25 MG: 50 TABLET, FILM COATED ORAL at 21:37

## 2022-01-13 RX ADMIN — CALCIUM ACETATE 1334 MG: 667 CAPSULE ORAL at 11:16

## 2022-01-13 RX ADMIN — HEPARIN SODIUM 5000 UNITS: 5000 INJECTION INTRAVENOUS; SUBCUTANEOUS at 05:43

## 2022-01-13 RX ADMIN — SODIUM ZIRCONIUM CYCLOSILICATE 10 G: 10 POWDER, FOR SUSPENSION ORAL at 11:16

## 2022-01-13 RX ADMIN — HYDRALAZINE HYDROCHLORIDE 25 MG: 50 TABLET, FILM COATED ORAL at 13:19

## 2022-01-13 RX ADMIN — TRAZODONE HYDROCHLORIDE 100 MG: 50 TABLET ORAL at 23:08

## 2022-01-13 RX ADMIN — LEVOTHYROXINE SODIUM 25 MCG: 25 TABLET ORAL at 08:35

## 2022-01-13 RX ADMIN — ISOSORBIDE MONONITRATE 30 MG: 30 TABLET, EXTENDED RELEASE ORAL at 08:36

## 2022-01-13 RX ADMIN — LACTULOSE 20 G: 10 SOLUTION ORAL at 16:42

## 2022-01-13 RX ADMIN — SODIUM CHLORIDE, PRESERVATIVE FREE 10 ML: 5 INJECTION INTRAVENOUS at 08:37

## 2022-01-13 RX ADMIN — ROPINIROLE HYDROCHLORIDE 0.5 MG: 0.25 TABLET, FILM COATED ORAL at 08:35

## 2022-01-13 RX ADMIN — SODIUM BICARBONATE 50 MEQ: 84 INJECTION, SOLUTION INTRAVENOUS at 06:40

## 2022-01-13 RX ADMIN — HUMAN INSULIN 10 UNITS: 100 INJECTION, SOLUTION SUBCUTANEOUS at 06:40

## 2022-01-13 RX ADMIN — HYDROCODONE BITARTRATE AND ACETAMINOPHEN 1 TABLET: 10; 325 TABLET ORAL at 13:20

## 2022-01-13 RX ADMIN — ROPINIROLE HYDROCHLORIDE 0.5 MG: 0.25 TABLET, FILM COATED ORAL at 21:36

## 2022-01-13 RX ADMIN — PRAVASTATIN SODIUM 20 MG: 40 TABLET ORAL at 08:35

## 2022-01-13 RX ADMIN — GABAPENTIN 300 MG: 300 CAPSULE ORAL at 21:37

## 2022-01-13 RX ADMIN — HEPARIN SODIUM 5000 UNITS: 5000 INJECTION INTRAVENOUS; SUBCUTANEOUS at 13:19

## 2022-01-13 RX ADMIN — CLOPIDOGREL 75 MG: 75 TABLET, FILM COATED ORAL at 08:36

## 2022-01-13 RX ADMIN — SODIUM ZIRCONIUM CYCLOSILICATE 10 G: 10 POWDER, FOR SUSPENSION ORAL at 23:08

## 2022-01-13 RX ADMIN — LACTULOSE 20 G: 10 SOLUTION ORAL at 21:37

## 2022-01-13 RX ADMIN — SODIUM CHLORIDE, PRESERVATIVE FREE 10 ML: 5 INJECTION INTRAVENOUS at 21:37

## 2022-01-13 RX ADMIN — HEPARIN SODIUM 5000 UNITS: 5000 INJECTION INTRAVENOUS; SUBCUTANEOUS at 21:37

## 2022-01-13 RX ADMIN — DEXTROSE MONOHYDRATE 50 ML: 500 INJECTION PARENTERAL at 06:40

## 2022-01-13 RX ADMIN — CALCIUM ACETATE 1334 MG: 667 CAPSULE ORAL at 08:35

## 2022-01-13 RX ADMIN — CARVEDILOL 12.5 MG: 6.25 TABLET, FILM COATED ORAL at 16:42

## 2022-01-13 RX ADMIN — INSULIN ASPART 3 UNITS: 100 INJECTION, SOLUTION INTRAVENOUS; SUBCUTANEOUS at 08:34

## 2022-01-13 RX ADMIN — SODIUM ZIRCONIUM CYCLOSILICATE 10 G: 10 POWDER, FOR SUSPENSION ORAL at 16:42

## 2022-01-13 RX ADMIN — CETIRIZINE HYDROCHLORIDE 5 MG: 10 TABLET, FILM COATED ORAL at 08:35

## 2022-01-13 RX ADMIN — INSULIN ASPART 3 UNITS: 100 INJECTION, SOLUTION INTRAVENOUS; SUBCUTANEOUS at 16:43

## 2022-01-13 RX ADMIN — CLONIDINE HYDROCHLORIDE 0.1 MG: 0.1 TABLET ORAL at 08:36

## 2022-01-13 RX ADMIN — CLONIDINE HYDROCHLORIDE 0.1 MG: 0.1 TABLET ORAL at 21:36

## 2022-01-13 RX ADMIN — LACTULOSE 20 G: 10 SOLUTION ORAL at 08:34

## 2022-01-13 RX ADMIN — CALCITRIOL 0.25 MCG: 0.25 CAPSULE, LIQUID FILLED ORAL at 08:37

## 2022-01-13 RX ADMIN — HYDRALAZINE HYDROCHLORIDE 25 MG: 50 TABLET, FILM COATED ORAL at 05:43

## 2022-01-13 RX ADMIN — PANTOPRAZOLE SODIUM 40 MG: 40 TABLET, DELAYED RELEASE ORAL at 08:35

## 2022-01-13 RX ADMIN — CALCIUM ACETATE 1334 MG: 667 CAPSULE ORAL at 16:42

## 2022-01-13 NOTE — PLAN OF CARE
Goal Outcome Evaluation:  Plan of Care Reviewed With: patient        Progress: no change  Outcome Summary: Pt resting with no complaints. No s/s of distress noted. Continue plan of care.

## 2022-01-13 NOTE — PROGRESS NOTES
[facility] HOSPITALIST PROGRESS NOTE     Patient Identification:  Name:  Reny Elena  Age:  61 y.o.  Sex:  female  :  1960  MRN:  [id number]  Visit Number:  [id number]  ROOM: [address] [place] [address]     Primary Care Provider:  Susan Stephens APRN    Length of stay in inpatient status:  2    Subjective     Chief Compliant:    Chief Complaint   Patient presents with   • Hypertension   • Chronic Renal Failure       History of Presenting Illness: Patient seen and evaluated in follow-up for hypertensive emergency with acute hyperkalemia in the setting of CKD stage V with recent peritoneal dialysis catheter placement with plans to begin peritoneal dialysis in the coming weeks.  Patient this morning feeling well.  Hemoglobin has had slight decline but overall stable.  Nephrology has seen and evaluated feels patient stable for discharge from renal standpoint to begin peritoneal dialysis on next Monday.  Discussed with patient and will plan for repeat CBC in the a.m. tomorrow and if stable likely return home as her blood pressure remains well controlled.    Objective     Current Hospital Meds:calcitriol, 0.25 mcg, Oral, Daily  calcium acetate, 1,334 mg, Oral, TID With Meals  carvedilol, 12.5 mg, Oral, BID With Meals  cetirizine, 5 mg, Oral, Daily  cloNIDine, 0.1 mg, Oral, BID  clopidogrel, 75 mg, Oral, Daily  gabapentin, 300 mg, Oral, Nightly  heparin (porcine), 5,000 Units, Subcutaneous, Q8H  hydrALAZINE, 25 mg, Oral, Q8H  insulin aspart, 0-7 Units, Subcutaneous, TID AC  insulin detemir, 15 Units, Subcutaneous, Nightly  isosorbide mononitrate, 30 mg, Oral, Daily  lactulose, 20 g, Oral, TID  levothyroxine, 25 mcg, Oral, Daily  pantoprazole, 40 mg, Oral, Daily  pravastatin, 20 mg, Oral, Daily  rOPINIRole, 0.5 mg, Oral, BID  sodium chloride, 10 mL, Intravenous, Q12H  traZODone, 100 mg, Oral, Nightly         Current Antimicrobial Therapy:  Anti-Infectives (From admission, onward)    None        Current Diuretic  Therapy:  Diuretics (From admission, onward)    None        ----------------------------------------------------------------------------------------------------------------------  Vital Signs:  Temp:  [97.6 °F (36.4 °C)-98.5 °F (36.9 °C)] 98.5 °F (36.9 °C)  Heart Rate:  [69-88] 72  Resp:  [18-20] 20  BP: (120-154)/(60-74) 140/66  SpO2:  [93 %-98 %] 95 %  on   ;   Device (Oxygen Therapy): room air  Body mass index is 21.5 kg/m².    Wt Readings from Last 3 Encounters:   01/12/22 58.6 kg (129 lb 3.2 oz)   12/16/21 56.7 kg (125 lb)   12/09/21 57.6 kg (127 lb)     Intake & Output (last 3 days)       01/10 0701 01/11 0700 01/11 0701 01/12 0700 01/12 0701 01/13 0700    P.O. 1040 820 840    Blood  900     IV Piggyback 500      Total Intake(mL/kg) 1540 (27.2) 1720 (29.4) 840 (14.3)    Urine (mL/kg/hr) 400 (0.3) 350 (0.2)     Stool 0      Total Output 400 350     Net +1140 +1370 +840           Urine Unmeasured Occurrence 2 x  1 x    Stool Unmeasured Occurrence 1 x          Diet Regular; Consistent Carbohydrate, Cardiac  ----------------------------------------------------------------------------------------------------------------------  Physical exam:  Constitutional:  Well-developed and well-nourished.  No respiratory distress.      HENT:  Head: Normocephalic and atraumatic.  Mouth:  Moist mucous membranes.    Eyes:  Conjunctivae and EOM are normal.  Pupils are equal, round, and reactive to light.  No scleral icterus.  Cardiovascular:  Normal rate, regular rhythm and normal heart sounds with no murmur.  Pulmonary/Chest:  No respiratory distress, no wheezes, no crackles, with normal breath sounds and good air movement.  Abdominal:  Soft.  Bowel sounds are normal.  No distension and no tenderness.  There is a peritoneal dialysis catheter present in the right lower quadrant.  Musculoskeletal:  No edema, no tenderness, and no deformity.  No red or swollen joints anywhere.    Neurological:  Alert and oriented to person,  place, and time.  No cranial nerve deficit.    Skin:  Skin is warm and dry.  No rash noted.  No pallor.   Peripheral vascular:  No edema and pulses on all 4 extremities.  ----------------------------------------------------------------------------------------------------------------------  Tele:    ----------------------------------------------------------------------------------------------------------------------  Results from last 7 days   Lab Units 01/12/22  1146 01/12/22  0025 01/11/22  1742 01/11/22  0825 01/11/22  0325 01/10/22  0729 01/10/22  0729 01/09/22  1802 01/09/22  1802   CRP mg/dL  --   --   --   --   --   --  <0.30  --  <0.30   WBC 10*3/mm3  --  3.87  --   --  5.19  --  6.32   < > 5.82   HEMOGLOBIN g/dL 7.2* 7.3* 7.9*   < > 6.6*   < > 8.5*   < > 9.1*   HEMATOCRIT % 23.3* 23.3* 26.0*   < > 21.0*   < > 27.4*   < > 29.1*   MCV fL  --  96.7  --   --  97.7*  --  97.9*   < > 95.4   MCHC g/dL  --  31.3*  --   --  31.4*  --  31.0*   < > 31.3*   PLATELETS 10*3/mm3  --  101*  --   --  115*  --  170   < > 181    < > = values in this interval not displayed.     Results from last 7 days   Lab Units 01/11/22  1008   PH, ARTERIAL pH units 7.210*   PO2 ART mm Hg 73.1*   PCO2, ARTERIAL mm Hg 50.9*   HCO3 ART mmol/L 20.4     Results from last 7 days   Lab Units 01/12/22  0025 01/11/22  0140 01/10/22  1602 01/10/22  0729 01/10/22  0729 01/10/22  0036 01/09/22  1802   SODIUM mmol/L 133* 138 137   < > 136   < > 135*   POTASSIUM mmol/L 4.7 4.6 5.8*   < > 5.5*   < > 6.2*   MAGNESIUM mg/dL  --   --   --   --   --   --  2.0   CHLORIDE mmol/L 104 109* 108*   < > 106   < > 105   CO2 mmol/L 17.9* 17.0* 14.9*   < > 15.6*   < > 17.0*   BUN mg/dL 58* 65* 65*   < > 67*   < > 67*   CREATININE mg/dL 4.60* 4.89* 4.97*   < > 5.09*   < > 4.75*   EGFR IF NONAFRICN AM mL/min/1.73 10* 9* 9*   < > 9*   < > 9*   CALCIUM mg/dL 7.7* 7.7* 8.5*   < > 9.1   < > 8.7   GLUCOSE mg/dL 230* 111* 216*   < > 244*   < > 307*   ALBUMIN g/dL  --  2.96*   --   --  3.88  --  4.02   BILIRUBIN mg/dL  --  0.2  --   --  0.2  --  0.2   ALK PHOS U/L  --  64  --   --  82  --  98   AST (SGOT) U/L  --  9  --   --  11  --  12   ALT (SGPT) U/L  --  7  --   --  10  --  10    < > = values in this interval not displayed.   Estimated Creatinine Clearance: 11.6 mL/min (A) (by C-G formula based on SCr of 4.6 mg/dL (H)).  No results found for: AMMONIA  Results from last 7 days   Lab Units 01/10/22  0729 01/09/22 2000 01/09/22  1802   TROPONIN T ng/mL 0.050* 0.039* 0.041*             Glucose   Date/Time Value Ref Range Status   01/12/2022 1830 183 (H) 70 - 130 mg/dL Final     Comment:     RN Notified Meter: [id number] : [id number] stephen santo   01/12/2022 1602 202 (H) 70 - 130 mg/dL Final     Comment:     Meter: [id number] : [id number] ALY ROSALES   01/12/2022 1027 111 70 - 130 mg/dL Final     Comment:     Meter: [id number] : [id number] ALY ROSALES   01/12/2022 0635 183 (H) 70 - 130 mg/dL Final     Comment:     Meter: [id number] : [id number] ALY CONNIE   01/11/2022 2358 239 (H) 70 - 130 mg/dL Final     Comment:     RN Notified Meter: [id number] : [id number] HEMANT WILLIAMSON   01/11/2022 2058 230 (H) 70 - 130 mg/dL Final     Comment:     Meter: [id number] : [id number] Morgan Collett   01/11/2022 1839 297 (H) 70 - 130 mg/dL Final     Comment:     RN Notified Meter: [id number] : [id number] HEMANT WILLIAMSON   01/11/2022 1716 241 (H) 70 - 130 mg/dL Final     Comment:     Meter: [id number] : [id number] RA RAPP     Lab Results   Component Value Date    TSH 6.100 (H) 01/09/2022    FREET4 1.03 01/09/2022     No results found for: PREGTESTUR, PREGSERUM, HCG, HCGQUANT  Pain Management Panel     Pain Management Panel Latest Ref Rng & Units 11/6/2021 11/6/2021    CREATININE UR mg/dL 24.4 24.4    AMPHETAMINES SCREEN, URINE Negative - -    BARBITURATES SCREEN Negative - -    BENZODIAZEPINE SCREEN, URINE Negative - -    BUPRENORPHINEUR Negative - -     COCAINE SCREEN, URINE Negative - -    METHADONE SCREEN, URINE Negative - -    METHAMPHETAMINEUR Negative - -        Brief Urine Lab Results  (Last result in the past 365 days)      Color   Clarity   Blood   Leuk Est   Nitrite   Protein   CREAT   Urine HCG        11/06/21 1626             24.4         11/06/21 1626             24.4             No results found for: BLOODCX  No results found for: URINECX  No results found for: WOUNDCX  No results found for: STOOLCX  No results found for: RESPCX  No results found for: AFBCX  Results from last 7 days   Lab Units 01/10/22  0729 01/09/22  1802   SED RATE mm/hr 23  --    CRP mg/dL <0.30 <0.30       I have personally looked at the labs and they are summarized above.  ----------------------------------------------------------------------------------------------------------------------  Detailed radiology reports for the last 24 hours:    Imaging Results (Last 24 Hours)     ** No results found for the last 24 hours. **        Assessment & Plan      Acute normocytic anemia    -Patient with history of anemia of chronic disease with noted acute decline in hemoglobin on recheck this morning.  Patient did receive IV fluids at initial presentation however hemoglobin less than 7 and therefore transfused 1 unit with appropriate response.    -Suspect related to progression of her chronic renal disease with reduced erythroid sánchez.    -Hemoglobin remained stable at 7.3 and 7.2 on recheck today.  Repeat CBC in the a.m. and if stable can return home.    -Threshold to transfuse for hemoglobin less than 7.  Consideration of EPO at nephrology's discretion.    Hypertensive emergency    -Blood pressure elevated with systolics greater than 230 at presentation now improved after receiving IV antihypertensives and being started on her home medications in the emergency department.    -Continue home antihypertensives however of note patient on 50 of hydralazine twice daily and will reduce this  to 25 3 times daily for smoother hypertensive control patient has tolerated well with excellent blood pressure control.     Acute hyperkalemia  CKD stage V  Status post PD catheter placement, not yet initiated    -Patient with improvement in potassium after administration of lactulose and potassium binder and insulin.    -Nephrology consulted, appreciate input    -Repeat CMP in the a.m.    -Continue normal saline however will reduce rate to 75 cc an hour given patient's history of heart failure    Acute respiratory acidosis  CO2 narcosis    -Patient earlier this morning with hypoglycemia but also after administration of D50 50 still with recurrent episode of somnolence and ABG revealed primary respiratory acidosis with elevated CO2 of 51 with pH 7.2.  Patient denies any history of sleep apnea however will place on BiPAP at night and monitor.     Diabetes mellitus type 2     -Basal and bolus insulin to maintain target glucose of 140-180     NSTEMI, type II    -Patient with accelerated hypertension and chronic kidney disease with overall fairly unimpressive rise in troponin with flat trend    -Patient chest pain-free at this time    -EKG without any ischemic changes    -Continue to monitor on telemetry and for any complaints of chest pain.     Heart failure with reduced ejection fraction    -Patient appears euvolemic on exam    -Patient most recent echo in November 2021 reviewed, shows evidence of recovery after previous EF of 46 to 50% now reading greater than 60%.    -Cautious IV fluid administration given heart failure.     History of seizure disorder    -Patient currently not on any antiepileptics, continue to monitor for now     Anxiety  Depression    -Supportive care and continue home medications.      VTE Prophylaxis:   Mechanical Order History:     None      Pharmalogical Order History:      Ordered     Dose Route Frequency Stop    01/10/22 1812  heparin (porcine) 5000 UNIT/ML injection 5,000 Units          5,000 Units SC Every 8 Hours Scheduled --                Disposition Home in a.m. if hemoglobin remains stable.  Emanuel Hartley DO  [facility] Emergencyist  01/12/22  19:51 EST

## 2022-01-13 NOTE — CONSULTS
"Diabetes Education  Assessment/Teaching    Patient Name:  Reny Elena  YOB: 1960  MRN: [id number]  Admit Date:  1/9/2022      Assessment Date:  1/13/2022    Most Recent Value   General Information     Height 165.1 cm (65\")   Height Method Stated   Weight 60.1 kg (132 lb 6.4 oz)   Weight Method Bed scale   Pregnancy Assessment    Diabetes History    Education Preferences    Nutrition Information    Assessment Topics    DM Goals             Most Recent Value   DM Education Needs    Meter Needs meter  [Dropped and broke her meter the day she was coming to the hospital. Has requested a new one to be ordered.]   Frequency of Testing 3 times a day   Blood Glucose Target 150   Blood Glucose Target Range 160s   Medication Insulin   Problem Solving Hypoglycemia,  Hyperglycemia,  Sick days,  Signs,  Symptoms,  Treatment   Reducing Risks A1C testing,  Lipids,  Blood pressure,  Eye exam,  Dental exam,  Foot care,  Immunizations,  Cardiovascular,  Neuropathy   Physical Activity Frequency Rarely   Healthy Coping Appropriate   Discharge Plan Home,  Follow-up with PCP   Motivation Engaged   Teaching Method Explanation,  Discussion,  Handouts   Patient Response Verbalized understanding            Other Comments:  HgbA1C 6.4  States has felt poorly for approximately 3 months and will be starting PD soon hoping that will help her to feel better.        Electronically signed by:  Lauren Mora RN  01/13/22 11:58 EST  "

## 2022-01-13 NOTE — PAYOR COMM NOTE
"[facility]  NPI:[id number]    Utilization Review  Contact: Adele Oropeza RN  Phone: [phone]  Fax:[phone]    CLINICAL UPDATE      [id number]    Reny Merino (61 y.o. Female)             Date of Birth Social Security Number Address Home Phone MRN    1960  [address] [id number] [phone] [id number]    Hoahaoism Marital Status             Zoroastrianism        Admission Date Admission Type Admitting Provider Attending Provider Department, Room/Bed    1/9/22 Emergency Emanuel Hartley DO Cagle, William, DO [facility] 3 [facility], 3304/1S    Discharge Date Discharge Disposition Discharge Destination                         Attending Provider: Emanuel Hartley DO    Allergies: Penicillins, Codeine, Sulfa Antibiotics    Isolation: None   Infection: None   Code Status: No CPR   Advance Care Planning Activity    Ht: 165.1 cm (65\")   Wt: 60.1 kg (132 lb 6.4 oz)    Admission Cmt: None   Principal Problem: None                Active Insurance as of 1/9/2022     Primary Coverage     Payor Plan Insurance Group Employer/Plan Group    [employer] [place] [employer] [place]ICA[employer]      Payor Plan Address Payor Plan Phone Number Payor Plan Fax Number Effective Dates    [address] [address] [phone]  4/12/2017 - None Entered    [facility] [id number]       Subscriber Name Subscriber Birth Date Member ID       RENY MERINO [id number]                 Emergency Contacts      (Rel.) Home Phone Work Phone Mobile Phone    Cliff Leiva (Significant Other) -- -- [phone]    HazelLiza (Daughter) -- -- [phone]          Nicholas Arroyo MD   Physician   Medicine   Progress Notes       Signed   Date of Service:  01/13/22 0737   Creation Time:  01/13/22 0737              Signed        Expand All Collapse All[]Expand All by Default          Show:Clear all  [x]Manual[x]Template[x]Copied    Added by:  [x]Nicholas Arroyo MD      []Alejandra for details    Nephrology Progress " Note           Subjective         Patient feels fine, no complaints.             Objective            Vital signs :      Temp:  [97.6 °F (36.4 °C)-98.7 °F (37.1 °C)] 98.7 °F (37.1 °C)  Heart Rate:  [70-84] 84  Resp:  [18-20] 18  BP: (120-151)/(61-69) 136/63        Intake/Output Summary (Last 24 hours) at 1/13/2022 0799  Last data filed at 1/13/2022 0211      Gross per 24 hour   Intake 960 ml   Output 300 ml   Net 660 ml         Physical Exam:     General Appearance : not in acute distress  Lungs : clear to auscultation, respirations regular  Heart :  regular rhythm & normal rate, normal S1, S2 and no murmur, no rub  Abdomen : normal bowel sounds, no masses, no hepatomegaly, no splenomegaly, soft non-tender and no guarding  Extremities : no edema, no cyanosis and no redness  Neurologic :   orientated to person, place, time and situation, Grossly no focal deficits  Acess :         Laboratory Data :      Albumin       Albumin   Date Value Ref Range Status   01/11/2022 2.96 (L) 3.50 - 5.20 g/dL Final       Magnesium No results found for: MG         PTH               No results found for: PTH     CBC and coagulation:               Results from last 7 days   Lab Units 01/13/22  0234 01/12/22  1146 01/12/22  0025 01/11/22  0825 01/11/22  0325 01/10/22  0729 01/10/22  0729 01/09/22  1802 01/09/22  1802   SED RATE mm/hr  --   --   --   --   --   --  23  --   --    CRP mg/dL  --   --   --   --   --   --  <0.30  --  <0.30   WBC 10*3/mm3 4.62  --  3.87  --  5.19   < > 6.32   < > 5.82   HEMOGLOBIN g/dL 7.4* 7.2* 7.3*   < > 6.6*   < > 8.5*   < > 9.1*   HEMATOCRIT % 24.0* 23.3* 23.3*   < > 21.0*   < > 27.4*   < > 29.1*   MCV fL 97.2*  --  96.7  --  97.7*   < > 97.9*   < > 95.4   MCHC g/dL 30.8*  --  31.3*  --  31.4*   < > 31.0*   < > 31.3*   PLATELETS 10*3/mm3 98*  --  101*  --  115*   < > 170   < > 181    < > = values in this interval not displayed.      Acid/base balance:       Results from last 7 days   Lab Units 01/11/22  1004    PH, ARTERIAL pH units 7.210*   PO2 ART mm Hg 73.1*   PCO2, ARTERIAL mm Hg 50.9*   HCO3 ART mmol/L 20.4      Renal and electrolytes:               Results from last 7 days   Lab Units 01/13/22  0234 01/12/22  0025 01/11/22  0140 01/10/22  1602 01/10/22  1602 01/10/22  0729 01/10/22  0729 01/10/22  0036 01/09/22  1802   SODIUM mmol/L 133* 133* 138  --  137  --  136   < > 135*   POTASSIUM mmol/L 5.9* 4.7 4.6   < > 5.8*   < > 5.5*   < > 6.2*   MAGNESIUM mg/dL  --   --   --   --   --   --   --   --  2.0   CHLORIDE mmol/L 104 104 109*   < > 108*   < > 106   < > 105   CO2 mmol/L 18.4* 17.9* 17.0*   < > 14.9*   < > 15.6*   < > 17.0*   BUN mg/dL 55* 58* 65*   < > 65*   < > 67*   < > 67*   CREATININE mg/dL 4.43* 4.60* 4.89*  --  4.97*  --  5.09*   < > 4.75*   EGFR IF NONAFRICN AM mL/min/1.73 10* 10* 9*   < > 9*   < > 9*   < > 9*   CALCIUM mg/dL 7.8* 7.7* 7.7*   < > 8.5*   < > 9.1   < > 8.7    < > = values in this interval not displayed.      Estimated Creatinine Clearance: 12.3 mL/min (A) (by C-G formula based on SCr of 4.43 mg/dL (H)).     Liver and pancreatic function:         Results from last 7 days   Lab Units 01/11/22  0140 01/10/22  0729 01/09/22  1802   ALBUMIN g/dL 2.96* 3.88 4.02   BILIRUBIN mg/dL 0.2 0.2 0.2   ALK PHOS U/L 64 82 98   AST (SGOT) U/L 9 11 12   ALT (SGPT) U/L 7 10 10   AMYLASE U/L  --   --  46   LIPASE U/L  --   --  32            Cardiac:      Liver and pancreatic function:         Results from last 7 days   Lab Units 01/11/22  0140 01/10/22  0729 01/09/22  1802   ALBUMIN g/dL 2.96* 3.88 4.02   BILIRUBIN mg/dL 0.2 0.2 0.2   ALK PHOS U/L 64 82 98   AST (SGOT) U/L 9 11 12   ALT (SGPT) U/L 7 10 10   AMYLASE U/L  --   --  46   LIPASE U/L  --   --  32         Medications :      calcitriol, 0.25 mcg, Oral, Daily  calcium acetate, 1,334 mg, Oral, TID With Meals  carvedilol, 12.5 mg, Oral, BID With Meals  cetirizine, 5 mg, Oral, Daily  cloNIDine, 0.1 mg, Oral, BID  clopidogrel, 75 mg, Oral,  Daily  gabapentin, 300 mg, Oral, Nightly  heparin (porcine), 5,000 Units, Subcutaneous, Q8H  hydrALAZINE, 25 mg, Oral, Q8H  insulin aspart, 0-7 Units, Subcutaneous, TID AC  insulin detemir, 15 Units, Subcutaneous, Nightly  isosorbide mononitrate, 30 mg, Oral, Daily  lactulose, 20 g, Oral, TID  levothyroxine, 25 mcg, Oral, Daily  pantoprazole, 40 mg, Oral, Daily  pravastatin, 20 mg, Oral, Daily  rOPINIRole, 0.5 mg, Oral, BID  sodium chloride, 10 mL, Intravenous, Q12H  traZODone, 100 mg, Oral, Nightly                    Assessment/Plan      1. 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 Anemia of chronic disease  3. Uncontrolled hypertension  4. DM-II     Mild hyperkalemia, Cr further  Improved to 4.4. will give Lokelma, educated about low K diet.   If K is > 5.5 tomorrow, give lokelma 10G daily to continue outpatient and discharge to initiate PD starting on Monday.   I will sign off, please call if any questions.         Nicholas Arroyo MD  01/13/22  07:38 EST

## 2022-01-13 NOTE — PROGRESS NOTES
Nephrology Progress Note      Subjective     Patient feels fine, no complaints.      Objective       Vital signs :     Temp:  [97.6 °F (36.4 °C)-98.7 °F (37.1 °C)] 98.7 °F (37.1 °C)  Heart Rate:  [70-84] 84  Resp:  [18-20] 18  BP: (120-151)/(61-69) 136/63      Intake/Output Summary (Last 24 hours) at 1/13/2022 0738  Last data filed at 1/13/2022 0211  Gross per 24 hour   Intake 960 ml   Output 300 ml   Net 660 ml       Physical Exam:    General Appearance : not in acute distress  Lungs : clear to auscultation, respirations regular  Heart :  regular rhythm & normal rate, normal S1, S2 and no murmur, no rub  Abdomen : normal bowel sounds, no masses, no hepatomegaly, no splenomegaly, soft non-tender and no guarding  Extremities : no edema, no cyanosis and no redness  Neurologic :   orientated to person, place, time and situation, Grossly no focal deficits  Acess :       Laboratory Data :     Albumin Albumin   Date Value Ref Range Status   01/11/2022 2.96 (L) 3.50 - 5.20 g/dL Final      Magnesium No results found for: MG       PTH               No results found for: PTH    CBC and coagulation:  Results from last 7 days   Lab Units 01/13/22  0234 01/12/22  1146 01/12/22  0025 01/11/22  0825 01/11/22  0325 01/10/22  0729 01/10/22  0729 01/09/22  1802 01/09/22  1802   SED RATE mm/hr  --   --   --   --   --   --  23  --   --    CRP mg/dL  --   --   --   --   --   --  <0.30  --  <0.30   WBC 10*3/mm3 4.62  --  3.87  --  5.19   < > 6.32   < > 5.82   HEMOGLOBIN g/dL 7.4* 7.2* 7.3*   < > 6.6*   < > 8.5*   < > 9.1*   HEMATOCRIT % 24.0* 23.3* 23.3*   < > 21.0*   < > 27.4*   < > 29.1*   MCV fL 97.2*  --  96.7  --  97.7*   < > 97.9*   < > 95.4   MCHC g/dL 30.8*  --  31.3*  --  31.4*   < > 31.0*   < > 31.3*   PLATELETS 10*3/mm3 98*  --  101*  --  115*   < > 170   < > 181    < > = values in this interval not displayed.     Acid/base balance:  Results from last 7 days   Lab Units 01/11/22  1008   PH, ARTERIAL pH units 7.210*   PO2 ART  mm Hg 73.1*   PCO2, ARTERIAL mm Hg 50.9*   HCO3 ART mmol/L 20.4     Renal and electrolytes:  Results from last 7 days   Lab Units 01/13/22  0234 01/12/22  0025 01/11/22  0140 01/10/22  1602 01/10/22  1602 01/10/22  0729 01/10/22  0729 01/10/22  0036 01/09/22  1802   SODIUM mmol/L 133* 133* 138  --  137  --  136   < > 135*   POTASSIUM mmol/L 5.9* 4.7 4.6   < > 5.8*   < > 5.5*   < > 6.2*   MAGNESIUM mg/dL  --   --   --   --   --   --   --   --  2.0   CHLORIDE mmol/L 104 104 109*   < > 108*   < > 106   < > 105   CO2 mmol/L 18.4* 17.9* 17.0*   < > 14.9*   < > 15.6*   < > 17.0*   BUN mg/dL 55* 58* 65*   < > 65*   < > 67*   < > 67*   CREATININE mg/dL 4.43* 4.60* 4.89*  --  4.97*  --  5.09*   < > 4.75*   EGFR IF NONAFRICN AM mL/min/1.73 10* 10* 9*   < > 9*   < > 9*   < > 9*   CALCIUM mg/dL 7.8* 7.7* 7.7*   < > 8.5*   < > 9.1   < > 8.7    < > = values in this interval not displayed.     Estimated Creatinine Clearance: 12.3 mL/min (A) (by C-G formula based on SCr of 4.43 mg/dL (H)).    Liver and pancreatic function:  Results from last 7 days   Lab Units 01/11/22  0140 01/10/22  0729 01/09/22  1802   ALBUMIN g/dL 2.96* 3.88 4.02   BILIRUBIN mg/dL 0.2 0.2 0.2   ALK PHOS U/L 64 82 98   AST (SGOT) U/L 9 11 12   ALT (SGPT) U/L 7 10 10   AMYLASE U/L  --   --  46   LIPASE U/L  --   --  32         Cardiac:      Liver and pancreatic function:  Results from last 7 days   Lab Units 01/11/22  0140 01/10/22  0729 01/09/22  1802   ALBUMIN g/dL 2.96* 3.88 4.02   BILIRUBIN mg/dL 0.2 0.2 0.2   ALK PHOS U/L 64 82 98   AST (SGOT) U/L 9 11 12   ALT (SGPT) U/L 7 10 10   AMYLASE U/L  --   --  46   LIPASE U/L  --   --  32       Medications :     calcitriol, 0.25 mcg, Oral, Daily  calcium acetate, 1,334 mg, Oral, TID With Meals  carvedilol, 12.5 mg, Oral, BID With Meals  cetirizine, 5 mg, Oral, Daily  cloNIDine, 0.1 mg, Oral, BID  clopidogrel, 75 mg, Oral, Daily  gabapentin, 300 mg, Oral, Nightly  heparin (porcine), 5,000 Units, Subcutaneous,  Q8H  hydrALAZINE, 25 mg, Oral, Q8H  insulin aspart, 0-7 Units, Subcutaneous, TID AC  insulin detemir, 15 Units, Subcutaneous, Nightly  isosorbide mononitrate, 30 mg, Oral, Daily  lactulose, 20 g, Oral, TID  levothyroxine, 25 mcg, Oral, Daily  pantoprazole, 40 mg, Oral, Daily  pravastatin, 20 mg, Oral, Daily  rOPINIRole, 0.5 mg, Oral, BID  sodium chloride, 10 mL, Intravenous, Q12H  traZODone, 100 mg, Oral, Nightly             Assessment/Plan   1. 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 Anemia of chronic disease  3. Uncontrolled hypertension  4. DM-II     Mild hyperkalemia, Cr further  Improved to 4.4. will give Lokelma, educated about low K diet.   If K is > 5.5 tomorrow, give lokelma 10G daily to continue outpatient and discharge to initiate PD starting on Monday.   I will sign off, please call if any questions.       Nicholas Arroyo MD  01/13/22  07:38 EST

## 2022-01-13 NOTE — PLAN OF CARE
Goal Outcome Evaluation:   Pt resting in bed. No signs or symptoms of distress noted. Vss. Will continue with plan of care.

## 2022-01-14 ENCOUNTER — READMISSION MANAGEMENT (OUTPATIENT)
Dept: CALL CENTER | Facility: HOSPITAL | Age: 62
End: 2022-01-14

## 2022-01-14 VITALS
SYSTOLIC BLOOD PRESSURE: 136 MMHG | WEIGHT: 131.4 LBS | TEMPERATURE: 97 F | OXYGEN SATURATION: 95 % | RESPIRATION RATE: 19 BRPM | HEIGHT: 65 IN | BODY MASS INDEX: 21.89 KG/M2 | DIASTOLIC BLOOD PRESSURE: 61 MMHG | HEART RATE: 75 BPM

## 2022-01-14 LAB
ANION GAP SERPL CALCULATED.3IONS-SCNC: 9.7 MMOL/L (ref 5–15)
BUN SERPL-MCNC: 59 MG/DL (ref 8–23)
BUN/CREAT SERPL: 12.7 (ref 7–25)
CALCIUM SPEC-SCNC: 8.6 MG/DL (ref 8.6–10.5)
CHLORIDE SERPL-SCNC: 106 MMOL/L (ref 98–107)
CO2 SERPL-SCNC: 21.3 MMOL/L (ref 22–29)
CREAT SERPL-MCNC: 4.65 MG/DL (ref 0.57–1)
DEPRECATED RDW RBC AUTO: 51.5 FL (ref 37–54)
ERYTHROCYTE [DISTWIDTH] IN BLOOD BY AUTOMATED COUNT: 14.4 % (ref 12.3–15.4)
GFR SERPL CREATININE-BSD FRML MDRD: 10 ML/MIN/1.73
GFR SERPL CREATININE-BSD FRML MDRD: ABNORMAL ML/MIN/{1.73_M2}
GLUCOSE BLDC GLUCOMTR-MCNC: 107 MG/DL (ref 70–130)
GLUCOSE BLDC GLUCOMTR-MCNC: 109 MG/DL (ref 70–130)
GLUCOSE BLDC GLUCOMTR-MCNC: 229 MG/DL (ref 70–130)
GLUCOSE BLDC GLUCOMTR-MCNC: 78 MG/DL (ref 70–130)
GLUCOSE SERPL-MCNC: 227 MG/DL (ref 65–99)
HCT VFR BLD AUTO: 24.9 % (ref 34–46.6)
HGB BLD-MCNC: 7.7 G/DL (ref 12–15.9)
MCH RBC QN AUTO: 30.2 PG (ref 26.6–33)
MCHC RBC AUTO-ENTMCNC: 30.9 G/DL (ref 31.5–35.7)
MCV RBC AUTO: 97.6 FL (ref 79–97)
PLATELET # BLD AUTO: 105 10*3/MM3 (ref 140–450)
PMV BLD AUTO: 10.8 FL (ref 6–12)
POTASSIUM SERPL-SCNC: 5.3 MMOL/L (ref 3.5–5.2)
POTASSIUM SERPL-SCNC: 6.3 MMOL/L (ref 3.5–5.2)
RBC # BLD AUTO: 2.55 10*6/MM3 (ref 3.77–5.28)
SODIUM SERPL-SCNC: 137 MMOL/L (ref 136–145)
WBC NRBC COR # BLD: 4.42 10*3/MM3 (ref 3.4–10.8)

## 2022-01-14 PROCEDURE — 94799 UNLISTED PULMONARY SVC/PX: CPT

## 2022-01-14 PROCEDURE — 99239 HOSP IP/OBS DSCHRG MGMT >30: CPT | Performed by: STUDENT IN AN ORGANIZED HEALTH CARE EDUCATION/TRAINING PROGRAM

## 2022-01-14 PROCEDURE — 84132 ASSAY OF SERUM POTASSIUM: CPT | Performed by: PHYSICIAN ASSISTANT

## 2022-01-14 PROCEDURE — 25010000002 HEPARIN (PORCINE) PER 1000 UNITS: Performed by: STUDENT IN AN ORGANIZED HEALTH CARE EDUCATION/TRAINING PROGRAM

## 2022-01-14 PROCEDURE — 82962 GLUCOSE BLOOD TEST: CPT

## 2022-01-14 PROCEDURE — 85027 COMPLETE CBC AUTOMATED: CPT | Performed by: STUDENT IN AN ORGANIZED HEALTH CARE EDUCATION/TRAINING PROGRAM

## 2022-01-14 PROCEDURE — 80048 BASIC METABOLIC PNL TOTAL CA: CPT | Performed by: STUDENT IN AN ORGANIZED HEALTH CARE EDUCATION/TRAINING PROGRAM

## 2022-01-14 PROCEDURE — 63710000001 INSULIN REGULAR HUMAN PER 5 UNITS: Performed by: PHYSICIAN ASSISTANT

## 2022-01-14 RX ORDER — INSULIN GLARGINE 100 [IU]/ML
10 INJECTION, SOLUTION SUBCUTANEOUS DAILY
Refills: 12
Start: 2022-01-14 | End: 2022-05-11

## 2022-01-14 RX ORDER — DEXTROSE MONOHYDRATE 25 G/50ML
50 INJECTION, SOLUTION INTRAVENOUS ONCE
Status: COMPLETED | OUTPATIENT
Start: 2022-01-14 | End: 2022-01-14

## 2022-01-14 RX ORDER — SODIUM POLYSTYRENE SULFONATE 4.1 MEQ/G
30 POWDER, FOR SUSPENSION ORAL; RECTAL ONCE
Status: COMPLETED | OUTPATIENT
Start: 2022-01-14 | End: 2022-01-14

## 2022-01-14 RX ORDER — HYDRALAZINE HYDROCHLORIDE 25 MG/1
25 TABLET, FILM COATED ORAL EVERY 8 HOURS SCHEDULED
Qty: 90 TABLET | Refills: 0 | Status: SHIPPED | OUTPATIENT
Start: 2022-01-14 | End: 2022-05-11

## 2022-01-14 RX ORDER — BLOOD-GLUCOSE METER
EACH MISCELLANEOUS
Qty: 1 EACH | Refills: 0 | Status: SHIPPED | OUTPATIENT
Start: 2022-01-14 | End: 2022-05-11

## 2022-01-14 RX ORDER — BLOOD SUGAR DIAGNOSTIC
STRIP MISCELLANEOUS
Qty: 100 EACH | Refills: 12 | Status: SHIPPED | OUTPATIENT
Start: 2022-01-14 | End: 2022-05-11

## 2022-01-14 RX ORDER — SODIUM ZIRCONIUM CYCLOSILICATE 10 G/10G
10 POWDER, FOR SUSPENSION ORAL DAILY
Qty: 3 PACKET | Refills: 0 | Status: SHIPPED | OUTPATIENT
Start: 2022-01-14 | End: 2022-01-17

## 2022-01-14 RX ORDER — LANCETS 30 GAUGE
EACH MISCELLANEOUS
Qty: 100 EACH | Refills: 12 | Status: SHIPPED | OUTPATIENT
Start: 2022-01-14 | End: 2022-05-11

## 2022-01-14 RX ADMIN — DEXTROSE MONOHYDRATE 50 ML: 500 INJECTION PARENTERAL at 04:27

## 2022-01-14 RX ADMIN — SODIUM POLYSTYRENE SULFONATE 30 G: 1 POWDER ORAL; RECTAL at 04:27

## 2022-01-14 RX ADMIN — CLOPIDOGREL 75 MG: 75 TABLET, FILM COATED ORAL at 08:42

## 2022-01-14 RX ADMIN — CETIRIZINE HYDROCHLORIDE 5 MG: 10 TABLET, FILM COATED ORAL at 08:42

## 2022-01-14 RX ADMIN — ISOSORBIDE MONONITRATE 30 MG: 30 TABLET, EXTENDED RELEASE ORAL at 08:42

## 2022-01-14 RX ADMIN — PRAVASTATIN SODIUM 20 MG: 40 TABLET ORAL at 08:42

## 2022-01-14 RX ADMIN — HUMAN INSULIN 10 UNITS: 100 INJECTION, SOLUTION SUBCUTANEOUS at 04:27

## 2022-01-14 RX ADMIN — CLONIDINE HYDROCHLORIDE 0.1 MG: 0.1 TABLET ORAL at 08:42

## 2022-01-14 RX ADMIN — LEVOTHYROXINE SODIUM 25 MCG: 25 TABLET ORAL at 08:42

## 2022-01-14 RX ADMIN — CALCIUM ACETATE 1334 MG: 667 CAPSULE ORAL at 08:42

## 2022-01-14 RX ADMIN — HYDRALAZINE HYDROCHLORIDE 25 MG: 50 TABLET, FILM COATED ORAL at 11:58

## 2022-01-14 RX ADMIN — HYDRALAZINE HYDROCHLORIDE 25 MG: 50 TABLET, FILM COATED ORAL at 05:30

## 2022-01-14 RX ADMIN — CALCIUM ACETATE 1334 MG: 667 CAPSULE ORAL at 11:58

## 2022-01-14 RX ADMIN — SODIUM BICARBONATE 50 MEQ: 84 INJECTION, SOLUTION INTRAVENOUS at 04:27

## 2022-01-14 RX ADMIN — LACTULOSE 20 G: 10 SOLUTION ORAL at 08:42

## 2022-01-14 RX ADMIN — HEPARIN SODIUM 5000 UNITS: 5000 INJECTION INTRAVENOUS; SUBCUTANEOUS at 05:30

## 2022-01-14 RX ADMIN — PANTOPRAZOLE SODIUM 40 MG: 40 TABLET, DELAYED RELEASE ORAL at 08:43

## 2022-01-14 RX ADMIN — CALCITRIOL 0.25 MCG: 0.25 CAPSULE, LIQUID FILLED ORAL at 08:42

## 2022-01-14 RX ADMIN — ROPINIROLE HYDROCHLORIDE 0.5 MG: 0.25 TABLET, FILM COATED ORAL at 08:42

## 2022-01-14 RX ADMIN — CARVEDILOL 12.5 MG: 6.25 TABLET, FILM COATED ORAL at 08:43

## 2022-01-14 RX ADMIN — HYDROCODONE BITARTRATE AND ACETAMINOPHEN 1 TABLET: 10; 325 TABLET ORAL at 02:55

## 2022-01-14 RX ADMIN — HEPARIN SODIUM 5000 UNITS: 5000 INJECTION INTRAVENOUS; SUBCUTANEOUS at 11:58

## 2022-01-14 RX ADMIN — HYDROCODONE BITARTRATE AND ACETAMINOPHEN 1 TABLET: 10; 325 TABLET ORAL at 14:59

## 2022-01-14 RX ADMIN — SODIUM CHLORIDE, PRESERVATIVE FREE 10 ML: 5 INJECTION INTRAVENOUS at 08:43

## 2022-01-14 NOTE — CASE MANAGEMENT/SOCIAL WORK
Discharge Planning Assessment  EDWARD Veliz     Patient Name: Reny Elena  MRN: [id number]  Today's Date: 1/14/2022    Admit Date: 1/9/2022     Discharge Needs Assessment    No documentation.                Discharge Plan     Row Name 01/14/22 1509       Plan    Final Discharge Disposition Code 01 - home or self-care    Final Note Pt being dc'd home today. sig.other will be providing transportation home. Pt expresses sig. other is very helpful at home and assists her as needed. No needs identified at this time.              Continued Care and Services - Admitted Since 1/9/2022    Coordination has not been started for this encounter.       Expected Discharge Date and Time     Expected Discharge Date Expected Discharge Time    Jan 14, 2022          Demographic Summary    No documentation.                Functional Status    No documentation.                Psychosocial    No documentation.                Abuse/Neglect    No documentation.                Legal    No documentation.                Substance Abuse    No documentation.                Patient Forms    No documentation.                   Ame Yuen RN

## 2022-01-14 NOTE — PLAN OF CARE
Goal Outcome Evaluation: Patient has been very pleasant today. Compliant with all medications and care as ordered. She remains on room air, saturations maintaining well above 90%. She has been ambulating to the bedside with assistance from nursing staff, good output noted. Patient has also been setup for each meal today, encouragement provided. Dressing to Peritoneal Dialysis Cath was removed and site was redressed. She did complain of pain this evening, PRN medications given as ordered. Family has been at bedside during visiting hours, also spoke to them via phone, updated on plan of care. BP has been closely monitored during shift, MD aware. She is to be DC on this date. IV and Telemetry Monitor removed and returned as ordered. Will continue to monitor and follow plan of care.     Made Cliff, patient's spouse aware of DC. He will be proving the ride.    DC Papers reviewed with patient and family, understanding verbalized. Medications and supplies delivered to patient. Awaiting transport.

## 2022-01-14 NOTE — PROGRESS NOTES
[facility] HOSPITALIST PROGRESS NOTE     Patient Identification:  Name:  Reny Elena  Age:  61 y.o.  Sex:  female  :  1960  MRN:  [id number]  Visit Number:  [id number]  ROOM: [address] [place] [address]     Primary Care Provider:  Susan Stephens APRN    Length of stay in inpatient status:  3    Subjective     Chief Compliant:    Chief Complaint   Patient presents with   • Hypertension   • Chronic Renal Failure       History of Presenting Illness: Patient seen and evaluated in follow-up for hypertensive emergency with acute hyperkalemia in the setting of CKD stage V with recent peritoneal dialysis catheter placement with plans to begin peritoneal dialysis in the coming weeks.  Patient this morning with stable hemoglobin however potassium trended upward requiring administration of oral potassium binding agent.    Objective     Current Hospital Meds:calcitriol, 0.25 mcg, Oral, Daily  calcium acetate, 1,334 mg, Oral, TID With Meals  carvedilol, 12.5 mg, Oral, BID With Meals  cetirizine, 5 mg, Oral, Daily  cloNIDine, 0.1 mg, Oral, BID  clopidogrel, 75 mg, Oral, Daily  gabapentin, 300 mg, Oral, Nightly  heparin (porcine), 5,000 Units, Subcutaneous, Q8H  hydrALAZINE, 25 mg, Oral, Q8H  insulin aspart, 0-7 Units, Subcutaneous, TID AC  insulin detemir, 15 Units, Subcutaneous, Nightly  isosorbide mononitrate, 30 mg, Oral, Daily  lactulose, 20 g, Oral, TID  levothyroxine, 25 mcg, Oral, Daily  pantoprazole, 40 mg, Oral, Daily  pravastatin, 20 mg, Oral, Daily  rOPINIRole, 0.5 mg, Oral, BID  sodium chloride, 10 mL, Intravenous, Q12H  sodium zirconium cyclosilicate, 10 g, Oral, TID  traZODone, 100 mg, Oral, Nightly         Current Antimicrobial Therapy:  Anti-Infectives (From admission, onward)    None        Current Diuretic Therapy:  Diuretics (From admission, onward)    None        ----------------------------------------------------------------------------------------------------------------------  Vital Signs:  Temp:   [98 °F (36.7 °C)-98.7 °F (37.1 °C)] 98.3 °F (36.8 °C)  Heart Rate:  [69-84] 75  Resp:  [18-20] 20  BP: (120-172)/(60-81) 172/81  SpO2:  [91 %-98 %] 91 %  on   ;   Device (Oxygen Therapy): room air  Body mass index is 22.03 kg/m².    Wt Readings from Last 3 Encounters:   01/13/22 60.1 kg (132 lb 6.4 oz)   12/16/21 56.7 kg (125 lb)   12/09/21 57.6 kg (127 lb)     Intake & Output (last 3 days)       01/11 0701 01/12 0700 01/12 0701 01/13 0700 01/13 0701 01/14 0700    P.O. 820 960 820    Blood 900      IV Piggyback       Total Intake(mL/kg) 1720 (29.4) 960 (16) 820 (13.6)    Urine (mL/kg/hr) 350 (0.2) 300 (0.2) 950 (1.2)    Stool       Total Output 350 300 950    Net +1370 +660 -130           Urine Unmeasured Occurrence  1 x         Diet Regular; Consistent Carbohydrate, Cardiac  ----------------------------------------------------------------------------------------------------------------------  Physical exam:  Constitutional:  Well-developed and well-nourished.  No respiratory distress.      HENT:  Head: Normocephalic and atraumatic.  Mouth:  Moist mucous membranes.    Eyes:  Conjunctivae and EOM are normal.  Pupils are equal, round, and reactive to light.  No scleral icterus.  Cardiovascular:  Normal rate, regular rhythm and normal heart sounds with no murmur.  Pulmonary/Chest:  No respiratory distress, no wheezes, no crackles, with normal breath sounds and good air movement.  Abdominal:  Soft.  Bowel sounds are normal.  No distension and no tenderness.  There is a peritoneal dialysis catheter present in the right lower quadrant.  Musculoskeletal:  No edema, no tenderness, and no deformity.  No red or swollen joints anywhere.    Neurological:  Alert and oriented to person, place, and time.  No cranial nerve deficit.    Skin:  Skin is warm and dry.  No rash noted.  No pallor.   Peripheral vascular:  No edema and pulses on all 4  extremities.  ----------------------------------------------------------------------------------------------------------------------  Tele:    ----------------------------------------------------------------------------------------------------------------------  Results from last 7 days   Lab Units 01/13/22  0234 01/12/22  1146 01/12/22  0025 01/11/22  0825 01/11/22  0325 01/10/22  0729 01/10/22  0729 01/09/22  1802 01/09/22  1802   CRP mg/dL  --   --   --   --   --   --  <0.30  --  <0.30   WBC 10*3/mm3 4.62  --  3.87  --  5.19   < > 6.32   < > 5.82   HEMOGLOBIN g/dL 7.4* 7.2* 7.3*   < > 6.6*   < > 8.5*   < > 9.1*   HEMATOCRIT % 24.0* 23.3* 23.3*   < > 21.0*   < > 27.4*   < > 29.1*   MCV fL 97.2*  --  96.7  --  97.7*   < > 97.9*   < > 95.4   MCHC g/dL 30.8*  --  31.3*  --  31.4*   < > 31.0*   < > 31.3*   PLATELETS 10*3/mm3 98*  --  101*  --  115*   < > 170   < > 181    < > = values in this interval not displayed.     Results from last 7 days   Lab Units 01/11/22  1008   PH, ARTERIAL pH units 7.210*   PO2 ART mm Hg 73.1*   PCO2, ARTERIAL mm Hg 50.9*   HCO3 ART mmol/L 20.4     Results from last 7 days   Lab Units 01/13/22  0933 01/13/22  0234 01/12/22  0025 01/11/22  0140 01/11/22  0140 01/10/22  1602 01/10/22  0729 01/10/22  0036 01/09/22  1802   SODIUM mmol/L  --  133* 133*  --  138   < > 136   < > 135*   POTASSIUM mmol/L 5.2 5.9* 4.7   < > 4.6   < > 5.5*   < > 6.2*   MAGNESIUM mg/dL  --   --   --   --   --   --   --   --  2.0   CHLORIDE mmol/L  --  104 104  --  109*   < > 106   < > 105   CO2 mmol/L  --  18.4* 17.9*  --  17.0*   < > 15.6*   < > 17.0*   BUN mg/dL  --  55* 58*  --  65*   < > 67*   < > 67*   CREATININE mg/dL  --  4.43* 4.60*  --  4.89*   < > 5.09*   < > 4.75*   EGFR IF NONAFRICN AM mL/min/1.73  --  10* 10*  --  9*   < > 9*   < > 9*   CALCIUM mg/dL  --  7.8* 7.7*  --  7.7*   < > 9.1   < > 8.7   GLUCOSE mg/dL  --  285* 230*  --  111*   < > 244*   < > 307*   ALBUMIN g/dL  --   --   --   --  2.96*  --   3.88  --  4.02   BILIRUBIN mg/dL  --   --   --   --  0.2  --  0.2  --  0.2   ALK PHOS U/L  --   --   --   --  64  --  82  --  98   AST (SGOT) U/L  --   --   --   --  9  --  11  --  12   ALT (SGPT) U/L  --   --   --   --  7  --  10  --  10    < > = values in this interval not displayed.   Estimated Creatinine Clearance: 12.3 mL/min (A) (by C-G formula based on SCr of 4.43 mg/dL (H)).  No results found for: AMMONIA  Results from last 7 days   Lab Units 01/10/22  0729 01/09/22 2000 01/09/22  1802   TROPONIN T ng/mL 0.050* 0.039* 0.041*             Glucose   Date/Time Value Ref Range Status   01/13/2022 1823 209 (H) 70 - 130 mg/dL Final     Comment:     Meter: [id number] : [id number] Sarah Rivera   01/13/2022 1612 214 (H) 70 - 130 mg/dL Final     Comment:     Meter: [id number] : [id number] TRISTON MOREJON   01/13/2022 1048 61 (L) 70 - 130 mg/dL Final     Comment:     Meter: [id number] : [id number] TRISTON MOREJON   01/13/2022 0731 234 (H) 70 - 130 mg/dL Final     Comment:     Meter: [id number] : [id number] TRISTON MOREJON   01/13/2022 0701 329 (H) 70 - 130 mg/dL Final     Comment:     Meter: [id number] : [id number] TRISTON MOREJON   01/13/2022 0618 276 (H) 70 - 130 mg/dL Final     Comment:     Meter: [id number] : [id number] TRISTON MOREJON   01/12/2022 1830 183 (H) 70 - 130 mg/dL Final     Comment:     RN Notified Meter: [id number] : [id number] stephen santo   01/12/2022 1602 202 (H) 70 - 130 mg/dL Final     Comment:     Meter: [id number] : [id number] ALY ROSALES     Lab Results   Component Value Date    TSH 6.100 (H) 01/09/2022    FREET4 1.03 01/09/2022     No results found for: PREGTESTUR, PREGSERUM, HCG, HCGQUANT  Pain Management Panel     Pain Management Panel Latest Ref Rng & Units 11/6/2021 11/6/2021    CREATININE UR mg/dL 24.4 24.4    AMPHETAMINES SCREEN, URINE Negative - -    BARBITURATES SCREEN Negative - -    BENZODIAZEPINE SCREEN, URINE Negative - -    BUPRENORPHINEUR  Negative - -    COCAINE SCREEN, URINE Negative - -    METHADONE SCREEN, URINE Negative - -    METHAMPHETAMINEUR Negative - -        Brief Urine Lab Results  (Last result in the past 365 days)      Color   Clarity   Blood   Leuk Est   Nitrite   Protein   CREAT   Urine HCG        11/06/21 1626             24.4         11/06/21 1626             24.4             No results found for: BLOODCX  No results found for: URINECX  No results found for: WOUNDCX  No results found for: STOOLCX  No results found for: RESPCX  No results found for: AFBCX  Results from last 7 days   Lab Units 01/10/22  0729 01/09/22  1802   SED RATE mm/hr 23  --    CRP mg/dL <0.30 <0.30       I have personally looked at the labs and they are summarized above.  ----------------------------------------------------------------------------------------------------------------------  Detailed radiology reports for the last 24 hours:    Imaging Results (Last 24 Hours)     ** No results found for the last 24 hours. **        Assessment & Plan      Acute normocytic anemia    -Patient with history of anemia of chronic disease with noted acute decline in hemoglobin on recheck this morning.  Patient did receive IV fluids at initial presentation however hemoglobin less than 7 and therefore transfused 1 unit with appropriate response.    -Suspect related to progression of her chronic renal disease with reduced erythroid sánchez.    -Hemoglobin remained stable.     -Threshold to transfuse for hemoglobin less than 7.  Consideration of EPO at nephrology's discretion.    Hypertensive emergency    -Blood pressure elevated with systolics greater than 230 at presentation now improved after receiving IV antihypertensives and being started on her home medications in the emergency department.    -Continue home antihypertensives however of note patient on 50 of hydralazine twice daily and will reduce this to 25 3 times daily for smoother hypertensive control patient has  tolerated well with excellent blood pressure control.     Acute hyperkalemia  CKD stage V  Status post PD catheter placement, not yet initiated    -Patient with recurrence of hyperkalemia given Lokelma.  Nephrology recommends if K greater than 5.5 tomorrow to discharge on Lokelma until time of initiation of PD.    -Nephrology consulted, appreciate input    -Repeat CMP in the a.m.    -Continue normal saline however will reduce rate to 75 cc an hour given patient's history of heart failure    Acute respiratory acidosis  CO2 narcosis    -Patient earlier this morning with hypoglycemia but also after administration of D50 50 still with recurrent episode of somnolence and ABG revealed primary respiratory acidosis with elevated CO2 of 51 with pH 7.2.  Patient denies any history of sleep apnea however will place on BiPAP at night and monitor.     Diabetes mellitus type 2     -Basal and bolus insulin to maintain target glucose of 140-180     NSTEMI, type II    -Patient with accelerated hypertension and chronic kidney disease with overall fairly unimpressive rise in troponin with flat trend    -Patient chest pain-free at this time    -EKG without any ischemic changes    -Continue to monitor on telemetry and for any complaints of chest pain.     Heart failure with reduced ejection fraction    -Patient appears euvolemic on exam    -Patient most recent echo in November 2021 reviewed, shows evidence of recovery after previous EF of 46 to 50% now reading greater than 60%.    -Cautious IV fluid administration given heart failure.     History of seizure disorder    -Patient currently not on any antiepileptics, continue to monitor for now     Anxiety  Depression    -Supportive care and continue home medications.      VTE Prophylaxis:   Mechanical Order History:     None      Pharmalogical Order History:      Ordered     Dose Route Frequency Stop    01/10/22 1812  heparin (porcine) 5000 UNIT/ML injection 5,000 Units         5,000  Units SC Every 8 Hours Scheduled --                Disposition Home in a.m. if hemoglobin remains stable.  Emanuel Hartley DO  [facility]  01/13/22  20:18 EST

## 2022-01-15 NOTE — OUTREACH NOTE
Prep Survey      Responses   [facility]thod[facility] facility patient discharged from? [facility]   Is LACE score < 7 ? No   Emergency Room discharge w/ pulse ox? No   Eligibility Readm Mgmt   Discharge diagnosis Hypertensive emergency, Hyperkalemia   Does the patient have one of the following disease processes/diagnoses(primary or secondary)? Other   Does the patient have Home health ordered? No   Is there a DME ordered? No   Prep survey completed? Yes          Shy Irving RN

## 2022-01-15 NOTE — PAYOR COMM NOTE
"[facility]  [facility]ERE[facility]  PHONE  [phone]  FAX  [phone]  NPI:  [id number]    PATIENT D/C 1/14/2021    REF#[id number]  NEEDING ADDITIONAL DAY APPROVED    Reny Merino (61 y.o. Female)             Date of Birth Social Security Number Address Home Phone MRN    1960  [address] [id number] [phone] [id number]    Jew Marital Status             Mandaen        Admission Date Admission Type Admitting Provider Attending Provider Department, Room/Bed    1/9/22 Emergency Emanuel Hartley DO  [facility] 3 SOUTH, 3304/1S    Discharge Date Discharge Disposition Discharge Destination          1/14/2022 Home or Self Care              Attending Provider: (none)   Allergies: Penicillins, Codeine, Sulfa Antibiotics    Isolation: None   Infection: None   Code Status: Prior   Advance Care Planning Activity    Ht: 165.1 cm (65\")   Wt: 59.6 kg (131 lb 6.4 oz)    Admission Cmt: None   Principal Problem: None                Active Insurance as of 1/9/2022     Primary Coverage     Payor Plan Insurance Group Employer/Plan Group    [employer] [place] [employer] [place]      Payor Plan Address Payor Plan Phone Number Payor Plan Fax Number Effective Dates    [address] [address] [phone]  4/12/2017 - None Entered    [facility] [address]       Subscriber Name Subscriber Birth Date Member ID       RENY MERINO 1960 [id number]                 Emergency Contacts      (Rel.) Home Phone Work Phone Mobile Phone    Cliff Leiva (Significant Other) -- -- [phone]    Liza Oliva (Daughter) -- -- [phone]              "

## 2022-01-17 ENCOUNTER — READMISSION MANAGEMENT (OUTPATIENT)
Dept: CALL CENTER | Facility: HOSPITAL | Age: 62
End: 2022-01-17

## 2022-01-17 NOTE — DISCHARGE SUMMARY
[facility] HOSPITALISTS DISCHARGE SUMMARY    Patient Identification:  Name:  Reny Elena  Age:  61 y.o.  Sex:  female  :  1960  MRN:  [id number]  Visit Number:  [id number]    Date of Admission: 2022  Date of Discharge: 2022    PCP: Susan Stephens APRN    DISCHARGE DIAGNOSIS    Acute normocytic anemia  Hypertensive emergency  Acute hyperkalemia  CKD stage V  Status post PD catheter placement, not yet initiated  Acute respiratory acidosis  CO2 narcosis  Diabetes mellitus type 2  NSTEMI, type II  Heart failure with reduced ejection fraction  History of seizure disorder  Anxiety  Depression  CONSULTS     Nephrology  Diabetes education  PROCEDURES PERFORMED      HOSPITAL COURSE  Patient is a 61 y.o. female presented to [facility] complaining of elevated blood pressure at home.  Please see the admitting history and physical for further details.      Patient was admitted to the hospitalist service for hypertensive emergency with acute hyperkalemia in the setting of CKD stage V status post recent peritoneal dialysis catheter placement not yet initiated on therapy.  Blood pressure initially with systolic greater than 230 at presentation which subsequently improved after receiving IV antihypertensives before being restarted on home medications.  Patient was able to be restarted on her home medications and adjustment made to her hydralazine to get better blood pressure control with good success during the hospitalization.  Patient also with acute hyperkalemia in the setting of CKD stage V at time of presentation and did receive insulin and D50 as well as Lokelma with improvement in potassium.  However patient did have persistent recurrent hyperkalemia and per nephrology's recommendations was recommended to be discharged home with 10 g of Lokelma daily until she could begin treatment with peritoneal dialysis.  While in the hospital patient was noted to have development of acute  normocytic anemia and did require transfusion of 1 unit of packed red blood cells with appropriate improvement in her  hemoglobin which remained stable post this.  Patient anemia was felt to be related to her chronic renal disease.  Administration of erythropoietin with left to nephrology's discretion and they did not recommend administration during this hospitalization.  Patient did have a brief episode of acute respiratory acidosis with CO2 narcosis that resolved after 1 night of BiPAP usage is felt to be related to hypoglycemia at the time rather than chronic process she required no further treatment with BiPAP therapy during the course of her hospitalization.  On the day of patient's discharge her potassium remained moderately controlled with the recommended interventions by nephrology and her blood pressure was well controlled on oral regiment and as such she was felt to have achieved maximal benefit of continued hospitalization and was discharged home per nephrology recommendations to follow-up with peritoneal dialysis education on the following Monday at which time she will also receive her first peritoneal dialysis session.      VITAL SIGNS:        on   ;        Body mass index is 21.87 kg/m².  Wt Readings from Last 3 Encounters:   01/14/22 59.6 kg (131 lb 6.4 oz)   12/16/21 56.7 kg (125 lb)   12/09/21 57.6 kg (127 lb)       PHYSICAL EXAM:  Constitutional:  Well-developed and well-nourished.  No respiratory distress.      HENT:  Head: Normocephalic and atraumatic.  Mouth:  Moist mucous membranes.    Eyes:  Conjunctivae and EOM are normal.  Pupils are equal, round, and reactive to light.  No scleral icterus.  Cardiovascular:  Normal rate, regular rhythm and normal heart sounds with no murmur.  Pulmonary/Chest:  No respiratory distress, no wheezes, no crackles, with normal breath sounds and good air movement.  Abdominal:  Soft.  Bowel sounds are normal.  No distension and no tenderness.  There is a peritoneal  dialysis catheter present in the right lower quadrant.  Musculoskeletal:  No edema, no tenderness, and no deformity.  No red or swollen joints anywhere.    Neurological:  Alert and oriented to person, place, and time.  No cranial nerve deficit.    Skin:  Skin is warm and dry.  No rash noted.  No pallor.   Peripheral vascular:  No edema and pulses In all 4 extremities.    DISCHARGE DISPOSITION   Stable    DISCHARGE MEDICATIONS:     Discharge Medications      New Medications      Instructions Start Date   Alcohol Swabs pads   Apply one alcohol swab to injection site of skin immediately prior to insulin injection.      Lokelma 10 g pack  Generic drug: sodium zirconium cyclosilicate   Mix 1 packet (10 g) in approximately 45 mL of water and drink by mouth Daily for 3 days.      ONE TOUCH ULTRA 2 w/Device kit   Use to test blood glucose up to four times daily as needed. Diagnosis: Type 2 Diabetes - Insulin Dependent      OneTouch Delica Plus [id number] misc   Use to test blood glucose up to four times daily as needed. Diagnosis: Type 2 Diabetes - Insulin Dependent      OneTouch Ultra test strip  Generic drug: glucose blood   Use to test blood glucose up to four times daily as needed. Diagnosis: Type 2 Diabetes - Insulin Dependent         Changes to Medications      Instructions Start Date   hydrALAZINE 25 MG tablet  Commonly known as: APRESOLINE  What changed:   · medication strength  · how much to take  · when to take this   25 mg, Oral, Every 8 Hours Scheduled      insulin glargine 100 UNIT/ML injection  Commonly known as: LANTUS, SEMGLEE  What changed: when to take this   10 Units, Subcutaneous, Daily         Continue These Medications      Instructions Start Date   calcitriol 0.25 MCG capsule  Commonly known as: ROCALTROL   0.25 mcg, Oral, Daily      calcium acetate 667 MG capsule capsule  Commonly known as: PHOS BINDER)   1,334 mg, Oral, 3 Times Daily With Meals      carvedilol 12.5 MG tablet  Commonly known as:  COREG   12.5 mg, Oral, 2 Times Daily With Meals      cetirizine 10 MG tablet  Commonly known as: zyrTEC   10 mg, Oral, Daily      cloNIDine 0.1 MG tablet  Commonly known as: CATAPRES   0.1 mg, Oral, 2 Times Daily      clopidogrel 75 MG tablet  Commonly known as: PLAVIX   75 mg, Oral, Daily      fluticasone 50 MCG/ACT nasal spray  Commonly known as: FLONASE   2 sprays, Nasal, Daily PRN      gabapentin 600 MG tablet  Commonly known as: NEURONTIN   300 mg, Oral, Nightly      HYDROcodone-acetaminophen  MG per tablet  Commonly known as: NORCO   1 tablet, Oral, Every 8 Hours PRN, Pharmacy directions states every 4 to 6 hours prn pain.       hydrOXYzine 25 MG tablet  Commonly known as: ATARAX   25 mg, Oral, 3 Times Daily PRN      insulin aspart 100 UNIT/ML injection  Commonly known as: novoLOG   3 Units, Subcutaneous, 3 Times Daily Before Meals      isosorbide mononitrate 30 MG 24 hr tablet  Commonly known as: IMDUR   30 mg, Oral, Daily      levothyroxine 25 MCG tablet  Commonly known as: SYNTHROID, LEVOTHROID   25 mcg, Oral, Daily      pantoprazole 40 MG EC tablet  Commonly known as: PROTONIX   40 mg, Oral, Daily      pravastatin 20 MG tablet  Commonly known as: PRAVACHOL   20 mg, Oral, Daily      rOPINIRole 0.5 MG tablet  Commonly known as: REQUIP   0.5 mg, Oral, 2 Times Daily      tiZANidine 4 MG tablet  Commonly known as: ZANAFLEX   4 mg, Oral, Every 6 Hours PRN      traZODone 100 MG tablet  Commonly known as: DESYREL   100 mg, Oral, Nightly             Diet Instructions    Regular         Activity Instructions    As tolerated         Additional Instructions for the Follow-ups that You Need to Schedule     Discharge Follow-up with PCP   As directed       Currently Documented PCP:    Susan Stephens APRN    PCP Phone Number:    [phone]     Follow Up Details: 1 week post hospital follow up            Follow-up Information     Susan Stephens APRN Follow up in 1 week(s).    Specialty: Nurse  Practitioner  Why: JAN 24@ 10:15AM  Contact information:  [address] [facility] [address]  [phone]             Nicholas Arroyo MD Follow up.    Specialty: Nephrology  Why: JAN 19 @ 12:45PM  Contact information:  [address] [facility] [address]  [phone]             Susan Stephens APRN .    Specialty: Nurse Practitioner  Contact information:  [address] [facility] [address]  [phone]                          TEST  RESULTS PENDING AT DISCHARGE       CODE STATUS  Code Status and Medical Interventions:   Ordered at: 01/10/22 1929     Medical Intervention Limits:    NO intubation (DNI)     Code Status (Patient has no pulse and is not breathing):    No CPR (Do Not Attempt to Resuscitate)     Medical Interventions (Patient has pulse or is breathing):    Limited Support       Emanuel Hartley DO  [facility]  01/16/22  22:11 EST    Please note that this discharge summary required more than 30 minutes to complete.

## 2022-01-17 NOTE — OUTREACH NOTE
Medical Week 1 Survey      Responses   [facility] patient discharged from? [facility]   Does the patient have one of the following disease processes/diagnoses(primary or secondary)? Other   Week 1 attempt successful? Yes   Call start time 1644   Rescheduled Rescheduled-pt requested  [pt requestes call back after 2p on 1/18/22]   Discharge diagnosis Hypertensive emergency, Hyperkalemia          Lisette Little RN

## 2022-01-18 ENCOUNTER — READMISSION MANAGEMENT (OUTPATIENT)
Dept: CALL CENTER | Facility: HOSPITAL | Age: 62
End: 2022-01-18

## 2022-01-18 ENCOUNTER — TELEPHONE (OUTPATIENT)
Dept: TELEMETRY | Facility: HOSPITAL | Age: 62
End: 2022-01-18

## 2022-01-24 ENCOUNTER — READMISSION MANAGEMENT (OUTPATIENT)
Dept: CALL CENTER | Facility: HOSPITAL | Age: 62
End: 2022-01-24

## 2022-01-24 NOTE — OUTREACH NOTE
Medical Week 2 Survey      Responses   [facility] patient discharged from? [facility]   Does the patient have one of the following disease processes/diagnoses(primary or secondary)? Other   Week 2 attempt successful? No   Unsuccessful attempts Attempt 1          Kalpana Turner LPN

## 2022-01-25 ENCOUNTER — READMISSION MANAGEMENT (OUTPATIENT)
Dept: CALL CENTER | Facility: HOSPITAL | Age: 62
End: 2022-01-25

## 2022-01-25 NOTE — OUTREACH NOTE
Medical Week 2 Survey      Responses   [facility] patient discharged from? [facility]   Does the patient have one of the following disease processes/diagnoses(primary or secondary)? Other   Week 2 attempt successful? Yes   Call start time 1249   Call end time 1251   Meds reviewed with patient/caregiver? Yes   Is the patient taking all medications as directed (includes completed medication regime)? Yes   Medication comments Dialysis started   Has the patient kept scheduled appointments due by today? Yes   Comments Has seen Syl Stephens   Has home health visited the patient within 72 hours of discharge? N/A   Comments Daily dialysis 9 hours every night at home, started last week 01/17 first date   What is the patient's perception of their health status since discharge? Improving   Week 2 Call Completed? Yes   Wrap up additional comments Blood sugars up from the solutions used, bp is down, has seen PCP this week, blessed with a wonderful  she states.           Irina Vazquez, RN

## 2022-02-01 ENCOUNTER — READMISSION MANAGEMENT (OUTPATIENT)
Dept: CALL CENTER | Facility: HOSPITAL | Age: 62
End: 2022-02-01

## 2022-02-01 NOTE — OUTREACH NOTE
Medical Week 3 Survey      Responses   [facility] patient discharged from? [facility]   Does the patient have one of the following disease processes/diagnoses(primary or secondary)? Other   Week 3 attempt successful? No   Unsuccessful attempts Attempt 1          Cindy Garzon RN

## 2022-02-02 ENCOUNTER — READMISSION MANAGEMENT (OUTPATIENT)
Dept: CALL CENTER | Facility: HOSPITAL | Age: 62
End: 2022-02-02

## 2022-02-02 NOTE — OUTREACH NOTE
Medical Week 3 Survey      Responses   [facility] patient discharged from? [place]   Does the patient have one of the following disease processes/diagnoses(primary or secondary)? Other   Week 3 attempt successful? No   Unsuccessful attempts Attempt 2          Rika Magallanes RN

## 2022-02-06 ENCOUNTER — HOSPITAL ENCOUNTER (EMERGENCY)
Facility: HOSPITAL | Age: 62
Discharge: HOME | End: 2022-02-06
Attending: EMERGENCY MEDICINE | Admitting: EMERGENCY MEDICINE

## 2022-02-06 ENCOUNTER — APPOINTMENT (OUTPATIENT)
Dept: GENERAL RADIOLOGY | Facility: HOSPITAL | Age: 62
End: 2022-02-06

## 2022-02-06 VITALS
RESPIRATION RATE: 18 BRPM | TEMPERATURE: 97.5 F | DIASTOLIC BLOOD PRESSURE: 92 MMHG | HEART RATE: 79 BPM | WEIGHT: 116 LBS | BODY MASS INDEX: 19.33 KG/M2 | SYSTOLIC BLOOD PRESSURE: 170 MMHG | HEIGHT: 65 IN | OXYGEN SATURATION: 96 %

## 2022-02-06 DIAGNOSIS — U07.1 COVID-19: Primary | ICD-10-CM

## 2022-02-06 LAB
ALBUMIN SERPL-MCNC: 3.69 G/DL (ref 3.5–5.2)
ALBUMIN/GLOB SERPL: 1.4 G/DL
ALP SERPL-CCNC: 71 U/L (ref 39–117)
ALT SERPL W P-5'-P-CCNC: 11 U/L (ref 1–33)
ANION GAP SERPL CALCULATED.3IONS-SCNC: 15.1 MMOL/L (ref 5–15)
AST SERPL-CCNC: 16 U/L (ref 1–32)
BASOPHILS # BLD AUTO: 0.03 10*3/MM3 (ref 0–0.2)
BASOPHILS NFR BLD AUTO: 0.5 % (ref 0–1.5)
BILIRUB SERPL-MCNC: 0.3 MG/DL (ref 0–1.2)
BUN SERPL-MCNC: 46 MG/DL (ref 8–23)
BUN/CREAT SERPL: 8.8 (ref 7–25)
CALCIUM SPEC-SCNC: 8.5 MG/DL (ref 8.6–10.5)
CHLORIDE SERPL-SCNC: 99 MMOL/L (ref 98–107)
CO2 SERPL-SCNC: 25.9 MMOL/L (ref 22–29)
CREAT SERPL-MCNC: 5.23 MG/DL (ref 0.57–1)
CRP SERPL-MCNC: <0.3 MG/DL (ref 0–0.5)
D-LACTATE SERPL-SCNC: 0.8 MMOL/L (ref 0.5–2)
DEPRECATED RDW RBC AUTO: 46.5 FL (ref 37–54)
EOSINOPHIL # BLD AUTO: 0.08 10*3/MM3 (ref 0–0.4)
EOSINOPHIL NFR BLD AUTO: 1.3 % (ref 0.3–6.2)
ERYTHROCYTE [DISTWIDTH] IN BLOOD BY AUTOMATED COUNT: 13.8 % (ref 12.3–15.4)
FERRITIN SERPL-MCNC: 457.8 NG/ML (ref 13–150)
FLUAV RNA RESP QL NAA+PROBE: NOT DETECTED
FLUBV RNA RESP QL NAA+PROBE: NOT DETECTED
GFR SERPL CREATININE-BSD FRML MDRD: 8 ML/MIN/1.73
GFR SERPL CREATININE-BSD FRML MDRD: ABNORMAL ML/MIN/{1.73_M2}
GLOBULIN UR ELPH-MCNC: 2.6 GM/DL
GLUCOSE SERPL-MCNC: 254 MG/DL (ref 65–99)
HCT VFR BLD AUTO: 27 % (ref 34–46.6)
HGB BLD-MCNC: 8.7 G/DL (ref 12–15.9)
IMM GRANULOCYTES # BLD AUTO: 0.03 10*3/MM3 (ref 0–0.05)
IMM GRANULOCYTES NFR BLD AUTO: 0.5 % (ref 0–0.5)
LDH SERPL-CCNC: 220 U/L (ref 135–214)
LYMPHOCYTES # BLD AUTO: 1.25 10*3/MM3 (ref 0.7–3.1)
LYMPHOCYTES NFR BLD AUTO: 19.9 % (ref 19.6–45.3)
MCH RBC QN AUTO: 29.6 PG (ref 26.6–33)
MCHC RBC AUTO-ENTMCNC: 32.2 G/DL (ref 31.5–35.7)
MCV RBC AUTO: 91.8 FL (ref 79–97)
MONOCYTES # BLD AUTO: 0.32 10*3/MM3 (ref 0.1–0.9)
MONOCYTES NFR BLD AUTO: 5.1 % (ref 5–12)
NEUTROPHILS NFR BLD AUTO: 4.57 10*3/MM3 (ref 1.7–7)
NEUTROPHILS NFR BLD AUTO: 72.7 % (ref 42.7–76)
NRBC BLD AUTO-RTO: 0 /100 WBC (ref 0–0.2)
NT-PROBNP SERPL-MCNC: 2391 PG/ML (ref 0–900)
PLATELET # BLD AUTO: 175 10*3/MM3 (ref 140–450)
PMV BLD AUTO: 9.9 FL (ref 6–12)
POTASSIUM SERPL-SCNC: 3.8 MMOL/L (ref 3.5–5.2)
PROCALCITONIN SERPL-MCNC: 0.19 NG/ML (ref 0–0.25)
PROT SERPL-MCNC: 6.3 G/DL (ref 6–8.5)
QT INTERVAL: 418 MS
QTC INTERVAL: 482 MS
RBC # BLD AUTO: 2.94 10*6/MM3 (ref 3.77–5.28)
SARS-COV-2 RNA RESP QL NAA+PROBE: DETECTED
SODIUM SERPL-SCNC: 140 MMOL/L (ref 136–145)
TROPONIN T SERPL-MCNC: 0.06 NG/ML (ref 0–0.03)
TROPONIN T SERPL-MCNC: 0.06 NG/ML (ref 0–0.03)
WBC NRBC COR # BLD: 6.28 10*3/MM3 (ref 3.4–10.8)

## 2022-02-06 PROCEDURE — 93005 ELECTROCARDIOGRAM TRACING: CPT | Performed by: EMERGENCY MEDICINE

## 2022-02-06 PROCEDURE — 83880 ASSAY OF NATRIURETIC PEPTIDE: CPT | Performed by: PHYSICIAN ASSISTANT

## 2022-02-06 PROCEDURE — 93010 ELECTROCARDIOGRAM REPORT: CPT | Performed by: INTERNAL MEDICINE

## 2022-02-06 PROCEDURE — 84484 ASSAY OF TROPONIN QUANT: CPT | Performed by: PHYSICIAN ASSISTANT

## 2022-02-06 PROCEDURE — 83605 ASSAY OF LACTIC ACID: CPT | Performed by: PHYSICIAN ASSISTANT

## 2022-02-06 PROCEDURE — 84145 PROCALCITONIN (PCT): CPT | Performed by: PHYSICIAN ASSISTANT

## 2022-02-06 PROCEDURE — 85025 COMPLETE CBC W/AUTO DIFF WBC: CPT | Performed by: PHYSICIAN ASSISTANT

## 2022-02-06 PROCEDURE — 71045 X-RAY EXAM CHEST 1 VIEW: CPT

## 2022-02-06 PROCEDURE — 82728 ASSAY OF FERRITIN: CPT | Performed by: PHYSICIAN ASSISTANT

## 2022-02-06 PROCEDURE — 80053 COMPREHEN METABOLIC PANEL: CPT | Performed by: PHYSICIAN ASSISTANT

## 2022-02-06 PROCEDURE — 87636 SARSCOV2 & INF A&B AMP PRB: CPT | Performed by: PHYSICIAN ASSISTANT

## 2022-02-06 PROCEDURE — 99284 EMERGENCY DEPT VISIT MOD MDM: CPT

## 2022-02-06 PROCEDURE — 93005 ELECTROCARDIOGRAM TRACING: CPT | Performed by: PHYSICIAN ASSISTANT

## 2022-02-06 PROCEDURE — 25010000002 LORAZEPAM PER 2 MG: Performed by: EMERGENCY MEDICINE

## 2022-02-06 PROCEDURE — 96374 THER/PROPH/DIAG INJ IV PUSH: CPT

## 2022-02-06 PROCEDURE — 36415 COLL VENOUS BLD VENIPUNCTURE: CPT

## 2022-02-06 PROCEDURE — 83615 LACTATE (LD) (LDH) ENZYME: CPT | Performed by: PHYSICIAN ASSISTANT

## 2022-02-06 PROCEDURE — 86140 C-REACTIVE PROTEIN: CPT | Performed by: PHYSICIAN ASSISTANT

## 2022-02-06 RX ORDER — LORAZEPAM 2 MG/ML
0.5 INJECTION INTRAMUSCULAR ONCE
Status: COMPLETED | OUTPATIENT
Start: 2022-02-06 | End: 2022-02-06

## 2022-02-06 RX ORDER — ALBUTEROL SULFATE 90 UG/1
2 AEROSOL, METERED RESPIRATORY (INHALATION) ONCE
Status: COMPLETED | OUTPATIENT
Start: 2022-02-06 | End: 2022-02-06

## 2022-02-06 RX ORDER — SODIUM CHLORIDE 0.9 % (FLUSH) 0.9 %
10 SYRINGE (ML) INJECTION AS NEEDED
Status: DISCONTINUED | OUTPATIENT
Start: 2022-02-06 | End: 2022-02-06 | Stop reason: HOSPADM

## 2022-02-06 RX ADMIN — ALBUTEROL SULFATE 2 PUFF: 90 AEROSOL, METERED RESPIRATORY (INHALATION) at 14:47

## 2022-02-06 RX ADMIN — LORAZEPAM 0.5 MG: 2 INJECTION INTRAMUSCULAR; INTRAVENOUS at 11:14

## 2022-02-06 NOTE — ED NOTES
MEDICAL SCREENING:    Reason for Visit: SOA    Patient initially seen in triage.  The patient was advised further evaluation and diagnostic testing will be needed, some of the treatment and testing will be initiated in the lobby in order to begin the process.  The patient will be returned to the waiting area for the time being and possibly be re-assessed by a subsequent ED provider.  The patient will be brought back to the treatment area in as timely manner as possible.       Jeremias Rodriguez PA-C  02/06/22 0855

## 2022-02-06 NOTE — ED PROVIDER NOTES
Subjective   62 yo female patient presents tot  ED with complaints of chest tightness and SOB that started yesterday around 5PM. Patient states she has hx of CKD with dialysis. PT states she does peritoneal dialysis and is having no problems with that. She denies any swelling or feeling like she is retaining fluid. Patient his afebrile. She denies any worsening or alleviating factors. Patient significant other is with her. He states that he thinks her chest pain and SOB is anxiety related. He states she has been upset and worrisome since the dialysis and just concerned about her health. PT states she feels that she is anxious too.  She also has xh of DM, thyroid disease, elevated cholesterol, GERD, Iron deficiency anemia, PAD, and depression.  Patient states she is not longer having CP. Patient denies any cough, congestion, or wheezing. She is partially vaccinated.        History provided by:  Patient   used: No        Review of Systems   Constitutional: Negative.    HENT: Negative.    Eyes: Negative.    Respiratory: Positive for chest tightness and shortness of breath.    Cardiovascular: Negative.    Gastrointestinal: Negative.    Endocrine: Negative.    Genitourinary: Negative.    Musculoskeletal: Negative.    Skin: Negative.    Allergic/Immunologic: Negative.    Neurological: Negative.    Hematological: Negative.    Psychiatric/Behavioral: The patient is nervous/anxious.    All other systems reviewed and are negative.      Past Medical History:   Diagnosis Date   • Arthritis    • Closed fracture of right fibula and tibia 12/25/2018   • Depression    • Diabetic ulcer of left foot associated with type 2 diabetes mellitus (HCC) 6/23/2017   • Diastolic dysfunction    • Disease of thyroid gland    • Elevated cholesterol    • End stage renal disease (HCC)    • Essential hypertension    • GERD (gastroesophageal reflux disease)    • History of transfusion    • Hyperlipidemia    • Iron deficiency  anemia 2018   • PAD (peripheral artery disease) ([facility])    • Renal failure    • Type 2 diabetes mellitus with hyperglycemia, with long-term current use of insulin ([facility]) 2018       Allergies   Allergen Reactions   • Penicillins Hives and GI Intolerance     Patient has received cefazolin, ceftriaxone and cefepime during past admissions.   • Codeine Hives, Rash and GI Intolerance     Pt tolerates Norco @ home   • Sulfa Antibiotics Hives, Rash and GI Intolerance     NAUSEA TOO       Past Surgical History:   Procedure Laterality Date   • ABDOMINAL SURGERY     • BACK SURGERY      Post spinal diskectomy, osteophytectomy in one lumbar interspace   • CATARACT EXTRACTION      Left    •  SECTION     • DENTAL PROCEDURE     • ENDOSCOPY     • FRACTURE SURGERY      right leg   • HYSTERECTOMY     • INCISION AND DRAINAGE LEG Left 4/15/2017    Procedure: INCISION AND DRAINAGE LOWER EXTREMITY;  Surgeon: Basilio Morris MD;  Location: [facility] OR;  Service:    • INCISION AND DRAINAGE LEG Left 2017    Procedure: Irrigation and Debridment abcess diabetic wound left foot with deep culture;  Surgeon: Basilio Morris MD;  Location: [facility] OR;  Service:    • INSERTION PERITONEAL DIALYSIS CATHETER N/A 2021    Procedure: INSERTION PERITONEAL DIALYSIS CATHETER LAPAROSCOPIC;  Surgeon: Edy Glover MD;  Location: [facility] OR;  Service: General;  Laterality: N/A;   • KNEE ARTHROSCOPY Right    • ORIF TIBIA FRACTURE Right 2018    Procedure: TIBIAL  FRACTURE OPEN REDUCTION INTERNAL FIXATION;  Surgeon: Jung Barragan MD;  Location: [facility] OR;  Service: Orthopedics   • TOE AMPUTATION Right     5th   • TUBAL ABDOMINAL LIGATION         Family History   Problem Relation Age of Onset   • Heart disease Mother    • Cancer Mother    • COPD Mother    • Diabetes Other    • Depression Other        Social History     Socioeconomic History   • Marital status:    Tobacco Use   • Smoking status: Never Smoker   • Smokeless  tobacco: Never Used   Vaping Use   • Vaping Use: Never used   Substance and Sexual Activity   • Alcohol use: No   • Drug use: No   • Sexual activity: Defer           Objective   Physical Exam  Vitals and nursing note reviewed.   Constitutional:       General: She is not in acute distress.     Appearance: She is well-developed and normal weight. She is not ill-appearing, toxic-appearing or diaphoretic.   HENT:      Head: Normocephalic and atraumatic.      Mouth/Throat:      Mouth: Mucous membranes are moist.      Pharynx: Oropharynx is clear.   Eyes:      Extraocular Movements: Extraocular movements intact.      Pupils: Pupils are equal, round, and reactive to light.   Cardiovascular:      Rate and Rhythm: Normal rate and regular rhythm.      Pulses: Normal pulses.      Heart sounds: Normal heart sounds.   Pulmonary:      Effort: Pulmonary effort is normal.      Breath sounds: Normal breath sounds. No decreased breath sounds, wheezing, rhonchi or rales.   Abdominal:      General: Bowel sounds are normal.      Palpations: Abdomen is soft.   Musculoskeletal:         General: Normal range of motion.      Cervical back: Normal range of motion and neck supple.   Skin:     General: Skin is warm and dry.      Capillary Refill: Capillary refill takes less than 2 seconds.   Neurological:      General: No focal deficit present.      Mental Status: She is alert and oriented to person, place, and time.   Psychiatric:         Mood and Affect: Mood normal.         Behavior: Behavior normal.         Procedures           ED Course  ED Course as of 02/10/22 1014   Sun Feb 06, 2022   0827 EKG: Normal sinus rhythm, rate 80, , QRS 98, QTc 482, poor baseline with NSST, no STEMI. [DH]   1028 XR Chest AP  IMPRESSION:  No acute disease.           Signer Name: Tete Flnaagan MD   Signed: 2/6/2022 9:49 AM   Workstation Name: [facility]-    [facility]          Specimen Collected: 02/06/22 09:49 Last Resulted:  02/06/22 09:49           [ML]   1259 PT declined monoclonal antibody infusion.  [ML]   1300 PT ED stay uncomplicated. Discussed findings with the patient. Pt states she will consult Dr. Sandy regarding what she should take for COVID. She declines all symptomatic treatment from me and the [facility]. Patient vitals stable and WNL. Discussed sx that would warrant return to the ED. She voices understanding.  [ML]      ED Course User Index  [DH] Edy Thomas MD  [ML] Veronica Glez PA                                                 [facility]    Final diagnoses:   COVID-19       ED Disposition  ED Disposition     ED Disposition Condition Comment    Discharge Stable           Susan Stephens, APRN  [address] [id number] [phone]    Schedule an appointment as soon as possible for a visit in 1 day      Han Sandy MD  [address] [place] [id number] [phone]    Schedule an appointment as soon as possible for a visit in 1 day           Medication List      No changes were made to your prescriptions during this visit.          Veronica Glez PA  02/10/22 1017

## 2022-03-07 ENCOUNTER — APPOINTMENT (OUTPATIENT)
Dept: CT IMAGING | Facility: HOSPITAL | Age: 62
End: 2022-03-07

## 2022-03-07 ENCOUNTER — HOSPITAL ENCOUNTER (EMERGENCY)
Facility: HOSPITAL | Age: 62
Discharge: HOME | End: 2022-03-07
Attending: STUDENT IN AN ORGANIZED HEALTH CARE EDUCATION/TRAINING PROGRAM | Admitting: STUDENT IN AN ORGANIZED HEALTH CARE EDUCATION/TRAINING PROGRAM

## 2022-03-07 VITALS
HEART RATE: 83 BPM | TEMPERATURE: 97.7 F | OXYGEN SATURATION: 99 % | BODY MASS INDEX: 19.99 KG/M2 | RESPIRATION RATE: 18 BRPM | DIASTOLIC BLOOD PRESSURE: 76 MMHG | WEIGHT: 120 LBS | HEIGHT: 65 IN | SYSTOLIC BLOOD PRESSURE: 138 MMHG

## 2022-03-07 DIAGNOSIS — K59.00 CONSTIPATION, UNSPECIFIED CONSTIPATION TYPE: Primary | ICD-10-CM

## 2022-03-07 LAB
ALBUMIN SERPL-MCNC: 3.38 G/DL (ref 3.5–5.2)
ALBUMIN/GLOB SERPL: 1.3 G/DL
ALP SERPL-CCNC: 48 U/L (ref 39–117)
ALT SERPL W P-5'-P-CCNC: 8 U/L (ref 1–33)
ANION GAP SERPL CALCULATED.3IONS-SCNC: 14.7 MMOL/L (ref 5–15)
AST SERPL-CCNC: 13 U/L (ref 1–32)
BACTERIA UR QL AUTO: ABNORMAL /HPF
BASOPHILS # BLD AUTO: 0.05 10*3/MM3 (ref 0–0.2)
BASOPHILS NFR BLD AUTO: 1 % (ref 0–1.5)
BILIRUB SERPL-MCNC: 0.2 MG/DL (ref 0–1.2)
BILIRUB UR QL STRIP: NEGATIVE
BUN SERPL-MCNC: 46 MG/DL (ref 8–23)
BUN/CREAT SERPL: 8.3 (ref 7–25)
CALCIUM SPEC-SCNC: 8.1 MG/DL (ref 8.6–10.5)
CHLORIDE SERPL-SCNC: 100 MMOL/L (ref 98–107)
CLARITY UR: CLEAR
CO2 SERPL-SCNC: 24.3 MMOL/L (ref 22–29)
COLOR UR: YELLOW
CREAT SERPL-MCNC: 5.51 MG/DL (ref 0.57–1)
D-LACTATE SERPL-SCNC: 0.8 MMOL/L (ref 0.5–2)
DEPRECATED RDW RBC AUTO: 47.9 FL (ref 37–54)
EGFRCR SERPLBLD CKD-EPI 2021: 8.2 ML/MIN/1.73
EOSINOPHIL # BLD AUTO: 0.12 10*3/MM3 (ref 0–0.4)
EOSINOPHIL NFR BLD AUTO: 2.4 % (ref 0.3–6.2)
ERYTHROCYTE [DISTWIDTH] IN BLOOD BY AUTOMATED COUNT: 13.5 % (ref 12.3–15.4)
GLOBULIN UR ELPH-MCNC: 2.6 GM/DL
GLUCOSE BLDC GLUCOMTR-MCNC: 91 MG/DL (ref 70–130)
GLUCOSE SERPL-MCNC: 122 MG/DL (ref 65–99)
GLUCOSE UR STRIP-MCNC: NEGATIVE MG/DL
HCT VFR BLD AUTO: 28.3 % (ref 34–46.6)
HGB BLD-MCNC: 8.9 G/DL (ref 12–15.9)
HGB UR QL STRIP.AUTO: NEGATIVE
HYALINE CASTS UR QL AUTO: ABNORMAL /LPF
IMM GRANULOCYTES # BLD AUTO: 0.01 10*3/MM3 (ref 0–0.05)
IMM GRANULOCYTES NFR BLD AUTO: 0.2 % (ref 0–0.5)
KETONES UR QL STRIP: NEGATIVE
LEUKOCYTE ESTERASE UR QL STRIP.AUTO: ABNORMAL
LYMPHOCYTES # BLD AUTO: 1.36 10*3/MM3 (ref 0.7–3.1)
LYMPHOCYTES NFR BLD AUTO: 27.7 % (ref 19.6–45.3)
MCH RBC QN AUTO: 30.2 PG (ref 26.6–33)
MCHC RBC AUTO-ENTMCNC: 31.4 G/DL (ref 31.5–35.7)
MCV RBC AUTO: 95.9 FL (ref 79–97)
MONOCYTES # BLD AUTO: 0.23 10*3/MM3 (ref 0.1–0.9)
MONOCYTES NFR BLD AUTO: 4.7 % (ref 5–12)
NEUTROPHILS NFR BLD AUTO: 3.14 10*3/MM3 (ref 1.7–7)
NEUTROPHILS NFR BLD AUTO: 64 % (ref 42.7–76)
NITRITE UR QL STRIP: NEGATIVE
NRBC BLD AUTO-RTO: 0 /100 WBC (ref 0–0.2)
PH UR STRIP.AUTO: 6.5 [PH] (ref 5–8)
PLATELET # BLD AUTO: 160 10*3/MM3 (ref 140–450)
PMV BLD AUTO: 9.1 FL (ref 6–12)
POTASSIUM SERPL-SCNC: 5 MMOL/L (ref 3.5–5.2)
PROT SERPL-MCNC: 6 G/DL (ref 6–8.5)
PROT UR QL STRIP: ABNORMAL
QT INTERVAL: 404 MS
QTC INTERVAL: 486 MS
RBC # BLD AUTO: 2.95 10*6/MM3 (ref 3.77–5.28)
RBC # UR STRIP: ABNORMAL /HPF
REF LAB TEST METHOD: ABNORMAL
SODIUM SERPL-SCNC: 139 MMOL/L (ref 136–145)
SP GR UR STRIP: 1.01 (ref 1–1.03)
SQUAMOUS #/AREA URNS HPF: ABNORMAL /HPF
UROBILINOGEN UR QL STRIP: ABNORMAL
WBC # UR STRIP: ABNORMAL /HPF
WBC NRBC COR # BLD: 4.91 10*3/MM3 (ref 3.4–10.8)

## 2022-03-07 PROCEDURE — 96374 THER/PROPH/DIAG INJ IV PUSH: CPT

## 2022-03-07 PROCEDURE — 74176 CT ABD & PELVIS W/O CONTRAST: CPT | Performed by: RADIOLOGY

## 2022-03-07 PROCEDURE — 93010 ELECTROCARDIOGRAM REPORT: CPT | Performed by: INTERNAL MEDICINE

## 2022-03-07 PROCEDURE — 74176 CT ABD & PELVIS W/O CONTRAST: CPT

## 2022-03-07 PROCEDURE — 71250 CT THORAX DX C-: CPT

## 2022-03-07 PROCEDURE — 71250 CT THORAX DX C-: CPT | Performed by: RADIOLOGY

## 2022-03-07 PROCEDURE — 93005 ELECTROCARDIOGRAM TRACING: CPT | Performed by: STUDENT IN AN ORGANIZED HEALTH CARE EDUCATION/TRAINING PROGRAM

## 2022-03-07 PROCEDURE — 87040 BLOOD CULTURE FOR BACTERIA: CPT | Performed by: PHYSICIAN ASSISTANT

## 2022-03-07 PROCEDURE — 96375 TX/PRO/DX INJ NEW DRUG ADDON: CPT

## 2022-03-07 PROCEDURE — 81001 URINALYSIS AUTO W/SCOPE: CPT | Performed by: PHYSICIAN ASSISTANT

## 2022-03-07 PROCEDURE — 99283 EMERGENCY DEPT VISIT LOW MDM: CPT

## 2022-03-07 PROCEDURE — 25010000002 LORAZEPAM PER 2 MG: Performed by: NURSE PRACTITIONER

## 2022-03-07 PROCEDURE — 82962 GLUCOSE BLOOD TEST: CPT

## 2022-03-07 PROCEDURE — 80053 COMPREHEN METABOLIC PANEL: CPT | Performed by: PHYSICIAN ASSISTANT

## 2022-03-07 PROCEDURE — 85025 COMPLETE CBC W/AUTO DIFF WBC: CPT | Performed by: PHYSICIAN ASSISTANT

## 2022-03-07 PROCEDURE — 25010000002 PROCHLORPERAZINE 10 MG/2ML SOLUTION: Performed by: NURSE PRACTITIONER

## 2022-03-07 PROCEDURE — 36415 COLL VENOUS BLD VENIPUNCTURE: CPT

## 2022-03-07 PROCEDURE — 83605 ASSAY OF LACTIC ACID: CPT | Performed by: PHYSICIAN ASSISTANT

## 2022-03-07 RX ORDER — SODIUM CHLORIDE 0.9 % (FLUSH) 0.9 %
10 SYRINGE (ML) INJECTION AS NEEDED
Status: DISCONTINUED | OUTPATIENT
Start: 2022-03-07 | End: 2022-03-07 | Stop reason: HOSPADM

## 2022-03-07 RX ORDER — LORAZEPAM 2 MG/ML
1 INJECTION INTRAMUSCULAR ONCE
Status: COMPLETED | OUTPATIENT
Start: 2022-03-07 | End: 2022-03-07

## 2022-03-07 RX ORDER — PROCHLORPERAZINE EDISYLATE 5 MG/ML
5 INJECTION INTRAMUSCULAR; INTRAVENOUS ONCE
Status: COMPLETED | OUTPATIENT
Start: 2022-03-07 | End: 2022-03-07

## 2022-03-07 RX ORDER — MAGNESIUM CARB/ALUMINUM HYDROX 105-160MG
296 TABLET,CHEWABLE ORAL ONCE
Status: COMPLETED | OUTPATIENT
Start: 2022-03-07 | End: 2022-03-07

## 2022-03-07 RX ORDER — POLYETHYLENE GLYCOL 3350 17 G/17G
17 POWDER, FOR SOLUTION ORAL DAILY
Qty: 510 G | Refills: 0 | Status: SHIPPED | OUTPATIENT
Start: 2022-03-07 | End: 2022-04-06

## 2022-03-07 RX ADMIN — CITROMA MAGNESIUM CITRATE 296 ML: 1.75 LIQUID ORAL at 17:56

## 2022-03-07 RX ADMIN — PROCHLORPERAZINE EDISYLATE 5 MG: 5 INJECTION INTRAMUSCULAR; INTRAVENOUS at 17:43

## 2022-03-07 RX ADMIN — LORAZEPAM 1 MG: 2 INJECTION INTRAMUSCULAR at 17:54

## 2022-03-07 NOTE — PROGRESS NOTES
Case Management/Social Work    Patient Name:  Reny Elena  YOB: 1960  MRN: [id number]  Admit Date:  3/7/2022        SS received call from [facility] per Shanice who states Pt requests to speak with SS about resources at home. SS spoke with Pt on this date who states she is interested in possible home health services. Pt does peritoneal dialysis with assistance from her significant other, Cliff Leiva helping to assist. SS notified Pt's PCP Susan Stephens office staff [phone] who have agreed to notify PCP that Pt is interested in home health services. No other needs identified.       Electronically signed by:  Anu Hurley  03/07/22 15:54 EST

## 2022-03-07 NOTE — EXTERNAL PATIENT INSTRUCTIONS
Patient Education   Table of Contents       Constipation, Adult     To view videos and all your education online visit,   [email or web address]   or scan this QR code with your smartphone.                  Constipation, Adult     Constipation is when a person has fewer than three bowel movements in a week, has difficulty having a bowel movement, or has stools (feces) that are dry, hard, or larger than normal. Constipation may be caused by an underlying condition. It may become worse with age if a person takes certain medicines and does not take in enough fluids.   Follow these instructions at home:   Eating and drinking            Eat foods that have a lot of fiber, such as beans, whole grains, and fresh fruits and vegetables.       Limit foods that are low in fiber and high in fat and processed sugars, such as fried or sweet foods. These include french fries, hamburgers, cookies, candies, and soda.       Drink enough fluid to keep your urine pale yellow.     General instructions         Exercise regularly or as told by your health care provider. Try to do 150 minutes of moderate exercise each week.       Use the bathroom when you have the urge to go. Do not  hold it in.       Take over-the-counter and prescription medicines only as told by your health care provider. This includes any fiber supplements.      During bowel movements:       Practice deep breathing while relaxing the lower abdomen.       Practice pelvic floor relaxation.       Watch your condition for any changes. Let your health care provider know about them.       Keep all follow-up visits as told by your health care provider. This is important.       Contact a health care provider if:         You have pain that gets worse.       You have a fever.       You do not have a bowel movement after 4 days.       You vomit.       You are not hungry or you lose weight.       You are bleeding from the opening between the buttocks (anus).       You  have thin, pencil-like stools.     Get help right away if:         You have a fever and your symptoms suddenly get worse.       You leak stool or have blood in your stool.       Your abdomen is bloated.       You have severe pain in your abdomen.       You feel dizzy or you faint.     Summary         Constipation is when a person has fewer than three bowel movements in a week, has difficulty having a bowel movement, or has stools (feces) that are dry, hard, or larger than normal.       Eat foods that have a lot of fiber, such as beans, whole grains, and fresh fruits and vegetables.       Drink enough fluid to keep your urine pale yellow.       Take over-the-counter and prescription medicines only as told by your health care provider. This includes any fiber supplements.     This information is not intended to replace advice given to you by your health care provider. Make sure you discuss any questions you have with your health care provider.     Document Released: 09/15/2005Document Revised: 11/04/2020Document Reviewed: 11/04/2020     [employer] Patient Education ? 2021 [employer]

## 2022-03-07 NOTE — EXTERNAL PATIENT INSTRUCTIONS
Patient Education   Table of Contents       Constipation, Adult     To view videos and all your education online visit,   [email or web address]   or scan this QR code with your smartphone.                  Constipation, Adult     Constipation is when a person has fewer than three bowel movements in a week, has difficulty having a bowel movement, or has stools (feces) that are dry, hard, or larger than normal. Constipation may be caused by an underlying condition. It may become worse with age if a person takes certain medicines and does not take in enough fluids.   Follow these instructions at home:   Eating and drinking            Eat foods that have a lot of fiber, such as beans, whole grains, and fresh fruits and vegetables.       Limit foods that are low in fiber and high in fat and processed sugars, such as fried or sweet foods. These include french fries, hamburgers, cookies, candies, and soda.       Drink enough fluid to keep your urine pale yellow.     General instructions         Exercise regularly or as told by your health care provider. Try to do 150 minutes of moderate exercise each week.       Use the bathroom when you have the urge to go. Do not  hold it in.       Take over-the-counter and prescription medicines only as told by your health care provider. This includes any fiber supplements.      During bowel movements:       Practice deep breathing while relaxing the lower abdomen.       Practice pelvic floor relaxation.       Watch your condition for any changes. Let your health care provider know about them.       Keep all follow-up visits as told by your health care provider. This is important.       Contact a health care provider if:         You have pain that gets worse.       You have a fever.       You do not have a bowel movement after 4 days.       You vomit.       You are not hungry or you lose weight.       You are bleeding from the opening between the buttocks (anus).       You  have thin, pencil-like stools.     Get help right away if:         You have a fever and your symptoms suddenly get worse.       You leak stool or have blood in your stool.       Your abdomen is bloated.       You have severe pain in your abdomen.       You feel dizzy or you faint.     Summary         Constipation is when a person has fewer than three bowel movements in a week, has difficulty having a bowel movement, or has stools (feces) that are dry, hard, or larger than normal.       Eat foods that have a lot of fiber, such as beans, whole grains, and fresh fruits and vegetables.       Drink enough fluid to keep your urine pale yellow.       Take over-the-counter and prescription medicines only as told by your health care provider. This includes any fiber supplements.     This information is not intended to replace advice given to you by your health care provider. Make sure you discuss any questions you have with your health care provider.     Document Released: 09/15/2005Document Revised: 11/04/2020Document Reviewed: 11/04/2020     [employer] Patient Education ? 2021 [email or web address]

## 2022-03-07 NOTE — ED NOTES
Patient called out reports glucose is low. Patient's glucose 91 at this time.     Estelle Borges, RN  03/07/22 4886

## 2022-03-07 NOTE — ED NOTES
Patient asked second time for urine sample. Patient reports she is unable to at this time.     Estelle Borges, RN  03/07/22 1878

## 2022-03-07 NOTE — ED NOTES
Patient requesting medication for her nerves. ESSENCE Flores made aware.     Estelle Borges, RN  03/07/22 1536

## 2022-03-08 NOTE — ED PROVIDER NOTES
Subjective   See KAREN LOWRY          Review of Systems    Past Medical History:   Diagnosis Date   • Arthritis    • Closed fracture of right fibula and tibia 2018   • Depression    • Diabetic ulcer of left foot associated with type 2 diabetes mellitus ([facility]) 2017   • Diastolic dysfunction    • Disease of thyroid gland    • Elevated cholesterol    • End stage renal disease ([facility])    • Essential hypertension    • GERD (gastroesophageal reflux disease)    • History of transfusion    • Hyperlipidemia    • Iron deficiency anemia 2018   • PAD (peripheral artery disease) ([facility])    • Renal failure    • Type 2 diabetes mellitus with hyperglycemia, with long-term current use of insulin ([facility]) 2018       Allergies   Allergen Reactions   • Penicillins Hives and GI Intolerance     Patient has received cefazolin, ceftriaxone and cefepime during past admissions.   • Codeine Hives, Rash and GI Intolerance     Pt tolerates Norco @ home   • Sulfa Antibiotics Hives, Rash and GI Intolerance     NAUSEA TOO       Past Surgical History:   Procedure Laterality Date   • ABDOMINAL SURGERY     • BACK SURGERY      Post spinal diskectomy, osteophytectomy in one lumbar interspace   • CATARACT EXTRACTION      Left    •  SECTION     • DENTAL PROCEDURE     • ENDOSCOPY     • FRACTURE SURGERY      right leg   • HYSTERECTOMY     • INCISION AND DRAINAGE LEG Left 4/15/2017    Procedure: INCISION AND DRAINAGE LOWER EXTREMITY;  Surgeon: Basilio Morris MD;  Location: [facility] OR;  Service:    • INCISION AND DRAINAGE LEG Left 2017    Procedure: Irrigation and Debridment abcess diabetic wound left foot with deep culture;  Surgeon: Basilio Morris MD;  Location: [facility] OR;  Service:    • INSERTION PERITONEAL DIALYSIS CATHETER N/A 2021    Procedure: INSERTION PERITONEAL DIALYSIS CATHETER LAPAROSCOPIC;  Surgeon: Edy Glover MD;  Location: [facility] OR;  Service: General;  Laterality: N/A;   • KNEE ARTHROSCOPY Right    •  ORIF TIBIA FRACTURE Right 12/28/2018    Procedure: TIBIAL  FRACTURE OPEN REDUCTION INTERNAL FIXATION;  Surgeon: Jung Barragan MD;  Location: [facility];  Service: Orthopedics   • TOE AMPUTATION Right     5th   • TUBAL ABDOMINAL LIGATION         Family History   Problem Relation Age of Onset   • Heart disease Mother    • Cancer Mother    • COPD Mother    • Diabetes Other    • Depression Other        Social History     Socioeconomic History   • Marital status:    Tobacco Use   • Smoking status: Never Smoker   • Smokeless tobacco: Never Used   Vaping Use   • Vaping Use: Never used   Substance and Sexual Activity   • Alcohol use: No   • Drug use: No   • Sexual activity: Defer           Objective   Physical Exam    Procedures         Results for orders placed or performed during the hospital encounter of 03/07/22   Comprehensive Metabolic Panel    Specimen: Arm, Right; Blood   Result Value Ref Range    Glucose 122 (H) 65 - 99 mg/dL    BUN 46 (H) 8 - 23 mg/dL    Creatinine 5.51 (H) 0.57 - 1.00 mg/dL    Sodium 139 136 - 145 mmol/L    Potassium 5.0 3.5 - 5.2 mmol/L    Chloride 100 98 - 107 mmol/L    CO2 24.3 22.0 - 29.0 mmol/L    Calcium 8.1 (L) 8.6 - 10.5 mg/dL    Total Protein 6.0 6.0 - 8.5 g/dL    Albumin 3.38 (L) 3.50 - 5.20 g/dL    ALT (SGPT) 8 1 - 33 U/L    AST (SGOT) 13 1 - 32 U/L    Alkaline Phosphatase 48 39 - 117 U/L    Total Bilirubin 0.2 0.0 - 1.2 mg/dL    Globulin 2.6 gm/dL    A/G Ratio 1.3 g/dL    BUN/Creatinine Ratio 8.3 7.0 - 25.0    Anion Gap 14.7 5.0 - 15.0 mmol/L    eGFR 8.2 (L) >60.0 mL/min/1.73   Urinalysis With Culture If Indicated - Urine, Clean Catch    Specimen: Urine, Clean Catch   Result Value Ref Range    Color, UA Yellow Yellow, Straw    Appearance, UA Clear Clear    pH, UA 6.5 5.0 - 8.0    Specific Gravity, UA 1.012 1.005 - 1.030    Glucose, UA Negative Negative    Ketones, UA Negative Negative    Bilirubin, UA Negative Negative    Blood, UA Negative Negative    Protein, UA >=300 mg/dL  (3+) (A) Negative    Leuk Esterase, UA Trace (A) Negative    Nitrite, UA Negative Negative    Urobilinogen, UA 0.2 E.U./dL 0.2 - 1.0 E.U./dL   Lactic Acid, Plasma    Specimen: Arm, Left; Blood   Result Value Ref Range    Lactate 0.8 0.5 - 2.0 mmol/L   CBC Auto Differential    Specimen: Arm, Left; Blood   Result Value Ref Range    WBC 4.91 3.40 - 10.80 10*3/mm3    RBC 2.95 (L) 3.77 - 5.28 10*6/mm3    Hemoglobin 8.9 (L) 12.0 - 15.9 g/dL    Hematocrit 28.3 (L) 34.0 - 46.6 %    MCV 95.9 79.0 - 97.0 fL    MCH 30.2 26.6 - 33.0 pg    MCHC 31.4 (L) 31.5 - 35.7 g/dL    RDW 13.5 12.3 - 15.4 %    RDW-SD 47.9 37.0 - 54.0 fl    MPV 9.1 6.0 - 12.0 fL    Platelets 160 140 - 450 10*3/mm3    Neutrophil % 64.0 42.7 - 76.0 %    Lymphocyte % 27.7 19.6 - 45.3 %    Monocyte % 4.7 (L) 5.0 - 12.0 %    Eosinophil % 2.4 0.3 - 6.2 %    Basophil % 1.0 0.0 - 1.5 %    Immature Grans % 0.2 0.0 - 0.5 %    Neutrophils, Absolute 3.14 1.70 - 7.00 10*3/mm3    Lymphocytes, Absolute 1.36 0.70 - 3.10 10*3/mm3    Monocytes, Absolute 0.23 0.10 - 0.90 10*3/mm3    Eosinophils, Absolute 0.12 0.00 - 0.40 10*3/mm3    Basophils, Absolute 0.05 0.00 - 0.20 10*3/mm3    Immature Grans, Absolute 0.01 0.00 - 0.05 10*3/mm3    nRBC 0.0 0.0 - 0.2 /100 WBC   Urinalysis, Microscopic Only - Urine, Clean Catch    Specimen: Urine, Clean Catch   Result Value Ref Range    RBC, UA None Seen None Seen, 0-2 /HPF    WBC, UA 3-5 (A) None Seen, 0-2 /HPF    Bacteria, UA None Seen None Seen /HPF    Squamous Epithelial Cells, UA 3-6 (A) None Seen, 0-2 /HPF    Hyaline Casts, UA None Seen None Seen /LPF    Methodology Manual Light Microscopy    POC Glucose Once    Specimen: Blood   Result Value Ref Range    Glucose 91 70 - 130 mg/dL   ECG 12 Lead   Result Value Ref Range    QT Interval 404 ms    QTC Interval 486 ms       ED Course  ED Course as of 03/07/22 2208   Mon Mar 07, 2022   1343 EKG noted sinus rhythm.  87 bpm.  .  QRS 84.  QTc 46.  No acute ST elevation [SF]   4647 Report  given to Shania Bey  [LC]   1717 CT Chest Without Contrast Diagnostic  IMPRESSION:    Small to moderate right pleural effusion. [MB]   1721 CT Abdomen Pelvis Without Contrast  IMPRESSION:  1.  Very abundant right colonic stool.  2.  Now small right pleural effusion. [MB]      ED Course User Index  [LC] Sandra Perla PA  [MB] Shania Bey APRN  [SF] Ajay Chavez DO MDM    Final diagnoses:   Constipation, unspecified constipation type       ED Disposition  ED Disposition     ED Disposition   Discharge    Condition   Stable    Comment   --             Susan Stephens APRN  [address] [facility] [id number] [phone]    Call in 2 days      Han Sandy MD  [address] [place] [address] [phone]    Call in 2 days           Medication List      New Prescriptions    polyethylene glycol 17 GM/SCOOP powder  Commonly known as: MIRALAX  Take 17 g by mouth Daily for 30 days.           Where to Get Your Medications      These medications were sent to [facility] - [place] - [address] [facility] [address] - [phone]  - [phone]   [address] [address] [address]    Phone: [phone]   · polyethylene glycol 17 GM/SCOOP powder          Sandra Perla PA  03/10/22 1036

## 2022-03-10 NOTE — ED PROVIDER NOTES
Subjective   Reason for Visit Patient states that she has had vomiting and fever for 2 days, states that her blood pressure gets low at times.  Patient does have peritoneal dialysis at home.  Patient has a history of depression arthritis hyperlipidemia hypertension peripheral of artery disease and type 2 diabetes.       History provided by:  Patient   used: No    Vomiting  The primary symptoms include abdominal pain. Primary symptoms do not include fever, nausea, vomiting, diarrhea, dysuria or rash.   The illness does not include chills.       Review of Systems   Constitutional: Positive for appetite change. Negative for activity change, chills and fever.   HENT: Negative for congestion, ear pain and sore throat.    Respiratory: Negative for cough, shortness of breath and wheezing.    Cardiovascular: Negative for chest pain.   Gastrointestinal: Positive for abdominal pain. Negative for diarrhea, nausea and vomiting.   Genitourinary: Negative for dysuria and flank pain.   Skin: Negative for rash.   Neurological: Negative for headaches.   Psychiatric/Behavioral: The patient is not nervous/anxious.    All other systems reviewed and are negative.      Past Medical History:   Diagnosis Date   • Arthritis    • Closed fracture of right fibula and tibia 12/25/2018   • Depression    • Diabetic ulcer of left foot associated with type 2 diabetes mellitus ([facility]) 6/23/2017   • Diastolic dysfunction    • Disease of thyroid gland    • Elevated cholesterol    • End stage renal disease ([facility])    • Essential hypertension    • GERD (gastroesophageal reflux disease)    • History of transfusion    • Hyperlipidemia    • Iron deficiency anemia 12/30/2018   • PAD (peripheral artery disease) ([facility])    • Renal failure    • Type 2 diabetes mellitus with hyperglycemia, with long-term current use of insulin ([facility]) 12/30/2018       Allergies   Allergen Reactions   • Penicillins Hives and GI Intolerance     Patient has received  cefazolin, ceftriaxone and cefepime during past admissions.   • Codeine Hives, Rash and GI Intolerance     Pt tolerates Norco @ home   • Sulfa Antibiotics Hives, Rash and GI Intolerance     NAUSEA TOO       Past Surgical History:   Procedure Laterality Date   • ABDOMINAL SURGERY     • BACK SURGERY      Post spinal diskectomy, osteophytectomy in one lumbar interspace   • CATARACT EXTRACTION      Left    •  SECTION     • DENTAL PROCEDURE     • ENDOSCOPY     • FRACTURE SURGERY      right leg   • HYSTERECTOMY     • INCISION AND DRAINAGE LEG Left 4/15/2017    Procedure: INCISION AND DRAINAGE LOWER EXTREMITY;  Surgeon: Basilio Morris MD;  Location: [facility] OR;  Service:    • INCISION AND DRAINAGE LEG Left 2017    Procedure: Irrigation and Debridment abcess diabetic wound left foot with deep culture;  Surgeon: Basilio Morris MD;  Location: [facility] OR;  Service:    • INSERTION PERITONEAL DIALYSIS CATHETER N/A 2021    Procedure: INSERTION PERITONEAL DIALYSIS CATHETER LAPAROSCOPIC;  Surgeon: Edy Glover MD;  Location: [facility] OR;  Service: General;  Laterality: N/A;   • KNEE ARTHROSCOPY Right    • ORIF TIBIA FRACTURE Right 2018    Procedure: TIBIAL  FRACTURE OPEN REDUCTION INTERNAL FIXATION;  Surgeon: Jung Barragan MD;  Location: [facility] OR;  Service: Orthopedics   • TOE AMPUTATION Right     5th   • TUBAL ABDOMINAL LIGATION         Family History   Problem Relation Age of Onset   • Heart disease Mother    • Cancer Mother    • COPD Mother    • Diabetes Other    • Depression Other        Social History     Socioeconomic History   • Marital status:    Tobacco Use   • Smoking status: Never Smoker   • Smokeless tobacco: Never Used   Vaping Use   • Vaping Use: Never used   Substance and Sexual Activity   • Alcohol use: No   • Drug use: No   • Sexual activity: Defer           Objective   Physical Exam  Vitals and nursing note reviewed.   Constitutional:       Appearance: She is well-developed.    HENT:      Head: Normocephalic.   Cardiovascular:      Rate and Rhythm: Normal rate and regular rhythm.   Pulmonary:      Effort: Pulmonary effort is normal.      Breath sounds: Normal breath sounds.   Abdominal:      General: Bowel sounds are normal.      Palpations: Abdomen is soft.      Tenderness: There is no abdominal tenderness.   Musculoskeletal:         General: Normal range of motion.      Cervical back: Neck supple.   Skin:     General: Skin is warm and dry.   Neurological:      Mental Status: She is alert and oriented to person, place, and time.   Psychiatric:         Behavior: Behavior normal.         Thought Content: Thought content normal.         Judgment: Judgment normal.         Procedures           ED Course  ED Course as of 03/10/22 1036   Mon Mar 07, 2022   1343 EKG noted sinus rhythm.  87 bpm.  .  QRS 84.  QTc 46.  No acute ST elevation [SF]   1557 Report given to Shania Bey  [LC]   1717 CT Chest Without Contrast Diagnostic  IMPRESSION:    Small to moderate right pleural effusion. [MB]   1721 CT Abdomen Pelvis Without Contrast  IMPRESSION:  1.  Very abundant right colonic stool.  2.  Now small right pleural effusion. [MB]      ED Course User Index  [LC] Sandra Perla PA  [MB] Shania Bey, DARIUS  [SF] Ajay Chavez DO MDM    Final diagnoses:   Constipation, unspecified constipation type       ED Disposition  ED Disposition     ED Disposition   Discharge    Condition   Stable    Comment   --             Susan Stephens APRN  [address] [facility] [address] [phone]    Call in 2 days      Han Sandy MD  [address] [place] [address] [phone]    Call in 2 days           Medication List      New Prescriptions    polyethylene glycol 17 GM/SCOOP powder  Commonly known as: MIRALAX  Take 17 g by mouth Daily for 30 days.           Where to Get Your Medications      These medications were sent to [facility]  - [place] - [address] [facility] [address] - [phone]  - [phone]  [address] [facility] [place] [address]    Phone: [phone]   · polyethylene glycol 17 GM/SCOOP powder          Sandra Perla PA  03/10/22 103

## 2022-03-12 LAB
BACTERIA SPEC AEROBE CULT: NORMAL
BACTERIA SPEC AEROBE CULT: NORMAL

## 2022-03-28 ENCOUNTER — APPOINTMENT (OUTPATIENT)
Dept: CT IMAGING | Facility: HOSPITAL | Age: 62
End: 2022-03-28

## 2022-03-28 ENCOUNTER — HOSPITAL ENCOUNTER (EMERGENCY)
Facility: HOSPITAL | Age: 62
Discharge: HOME | End: 2022-03-28
Attending: EMERGENCY MEDICINE | Admitting: EMERGENCY MEDICINE

## 2022-03-28 ENCOUNTER — APPOINTMENT (OUTPATIENT)
Dept: GENERAL RADIOLOGY | Facility: HOSPITAL | Age: 62
End: 2022-03-28

## 2022-03-28 VITALS
SYSTOLIC BLOOD PRESSURE: 182 MMHG | DIASTOLIC BLOOD PRESSURE: 91 MMHG | RESPIRATION RATE: 18 BRPM | HEART RATE: 80 BPM | HEIGHT: 65 IN | BODY MASS INDEX: 17.83 KG/M2 | WEIGHT: 107 LBS | OXYGEN SATURATION: 97 % | TEMPERATURE: 98.6 F

## 2022-03-28 DIAGNOSIS — R11.2 INTRACTABLE VOMITING WITH NAUSEA, UNSPECIFIED VOMITING TYPE: Primary | ICD-10-CM

## 2022-03-28 LAB
A-A DO2: 23.7 MMHG (ref 0–300)
ACETONE BLD QL: NEGATIVE
ALBUMIN SERPL-MCNC: 3.37 G/DL (ref 3.5–5.2)
ALBUMIN/GLOB SERPL: 1.5 G/DL
ALP SERPL-CCNC: 53 U/L (ref 39–117)
ALT SERPL W P-5'-P-CCNC: 15 U/L (ref 1–33)
AMYLASE SERPL-CCNC: 34 U/L (ref 28–100)
ANION GAP SERPL CALCULATED.3IONS-SCNC: 11.3 MMOL/L (ref 5–15)
APPEARANCE FLD: CLEAR
ARTERIAL PATENCY WRIST A: POSITIVE
AST SERPL-CCNC: 19 U/L (ref 1–32)
ATMOSPHERIC PRESS: 731 MMHG
BACTERIA UR QL AUTO: ABNORMAL /HPF
BASE EXCESS BLDA CALC-SCNC: 4.6 MMOL/L (ref 0–2)
BASOPHILS # BLD AUTO: 0.07 10*3/MM3 (ref 0–0.2)
BASOPHILS NFR BLD AUTO: 1.1 % (ref 0–1.5)
BDY SITE: ABNORMAL
BILIRUB SERPL-MCNC: 0.3 MG/DL (ref 0–1.2)
BILIRUB UR QL STRIP: NEGATIVE
BODY TEMPERATURE: 0 C
BUN SERPL-MCNC: 21 MG/DL (ref 8–23)
BUN/CREAT SERPL: 5.3 (ref 7–25)
CALCIUM SPEC-SCNC: 8.3 MG/DL (ref 8.6–10.5)
CHLORIDE SERPL-SCNC: 100 MMOL/L (ref 98–107)
CLARITY UR: ABNORMAL
CO2 BLDA-SCNC: 31.6 MMOL/L (ref 22–33)
CO2 SERPL-SCNC: 26.7 MMOL/L (ref 22–29)
COHGB MFR BLD: 1.1 % (ref 0–5)
COLOR FLD: COLORLESS
COLOR UR: ABNORMAL
CREAT SERPL-MCNC: 3.97 MG/DL (ref 0.57–1)
CRP SERPL-MCNC: <0.3 MG/DL (ref 0–0.5)
D-LACTATE SERPL-SCNC: 0.9 MMOL/L (ref 0.5–2)
DEPRECATED RDW RBC AUTO: 47.7 FL (ref 37–54)
EGFRCR SERPLBLD CKD-EPI 2021: 12.1 ML/MIN/1.73
EOSINOPHIL # BLD AUTO: 0.19 10*3/MM3 (ref 0–0.4)
EOSINOPHIL NFR BLD AUTO: 3 % (ref 0.3–6.2)
ERYTHROCYTE [DISTWIDTH] IN BLOOD BY AUTOMATED COUNT: 13.3 % (ref 12.3–15.4)
FLUAV RNA RESP QL NAA+PROBE: NOT DETECTED
FLUBV RNA RESP QL NAA+PROBE: NOT DETECTED
GLOBULIN UR ELPH-MCNC: 2.2 GM/DL
GLUCOSE BLDC GLUCOMTR-MCNC: 183 MG/DL (ref 70–130)
GLUCOSE SERPL-MCNC: 196 MG/DL (ref 65–99)
GLUCOSE UR STRIP-MCNC: ABNORMAL MG/DL
HCO3 BLDA-SCNC: 30.1 MMOL/L (ref 20–26)
HCT VFR BLD AUTO: 29.9 % (ref 34–46.6)
HCT VFR BLD CALC: 26.6 % (ref 38–51)
HGB BLD-MCNC: 9.5 G/DL (ref 12–15.9)
HGB BLDA-MCNC: 8.7 G/DL (ref 13.5–17.5)
HGB UR QL STRIP.AUTO: NEGATIVE
HOLD SPECIMEN: NORMAL
HYALINE CASTS UR QL AUTO: ABNORMAL /LPF
IMM GRANULOCYTES # BLD AUTO: 0.02 10*3/MM3 (ref 0–0.05)
IMM GRANULOCYTES NFR BLD AUTO: 0.3 % (ref 0–0.5)
INHALED O2 CONCENTRATION: 21 %
KETONES UR QL STRIP: ABNORMAL
LEUKOCYTE ESTERASE UR QL STRIP.AUTO: ABNORMAL
LIPASE SERPL-CCNC: 14 U/L (ref 13–60)
LYMPHOCYTES # BLD AUTO: 1.28 10*3/MM3 (ref 0.7–3.1)
LYMPHOCYTES NFR BLD AUTO: 20.3 % (ref 19.6–45.3)
LYMPHOCYTES NFR FLD MANUAL: 15 %
Lab: ABNORMAL
MAGNESIUM SERPL-MCNC: 1.7 MG/DL (ref 1.6–2.4)
MCH RBC QN AUTO: 31.1 PG (ref 26.6–33)
MCHC RBC AUTO-ENTMCNC: 31.8 G/DL (ref 31.5–35.7)
MCV RBC AUTO: 98 FL (ref 79–97)
METHGB BLD QL: 0.6 % (ref 0–3)
METHOD: NORMAL
MODALITY: ABNORMAL
MONOCYTES # BLD AUTO: 0.35 10*3/MM3 (ref 0.1–0.9)
MONOCYTES NFR BLD AUTO: 5.6 % (ref 5–12)
MONOS+MACROS NFR FLD: 82 %
NEUTROPHILS NFR BLD AUTO: 4.38 10*3/MM3 (ref 1.7–7)
NEUTROPHILS NFR BLD AUTO: 69.7 % (ref 42.7–76)
NEUTROPHILS NFR FLD MANUAL: 3 %
NITRITE UR QL STRIP: NEGATIVE
NOTE: ABNORMAL
NRBC BLD AUTO-RTO: 0 /100 WBC (ref 0–0.2)
NUC CELL # FLD: 12 /MM3
OXYHGB MFR BLDV: 91.6 % (ref 94–99)
PCO2 BLDA: 48.8 MM HG (ref 35–45)
PCO2 TEMP ADJ BLD: ABNORMAL MM[HG]
PH BLDA: 7.4 PH UNITS (ref 7.35–7.45)
PH UR STRIP.AUTO: <=5 [PH] (ref 5–8)
PH, TEMP CORRECTED: ABNORMAL
PLATELET # BLD AUTO: 170 10*3/MM3 (ref 140–450)
PMV BLD AUTO: 9.8 FL (ref 6–12)
PO2 BLDA: 64.9 MM HG (ref 83–108)
PO2 TEMP ADJ BLD: ABNORMAL MM[HG]
POTASSIUM SERPL-SCNC: 3 MMOL/L (ref 3.5–5.2)
PROCALCITONIN SERPL-MCNC: 0.35 NG/ML (ref 0–0.25)
PROT SERPL-MCNC: 5.6 G/DL (ref 6–8.5)
PROT UR QL STRIP: ABNORMAL
QT INTERVAL: 480 MS
QTC INTERVAL: 528 MS
RBC # BLD AUTO: 3.05 10*6/MM3 (ref 3.77–5.28)
RBC # FLD AUTO: NORMAL 10*3/UL
RBC # UR STRIP: ABNORMAL /HPF
REF LAB TEST METHOD: ABNORMAL
SAO2 % BLDCOA: 93.2 % (ref 94–99)
SARS-COV-2 RNA RESP QL NAA+PROBE: NOT DETECTED
SODIUM SERPL-SCNC: 138 MMOL/L (ref 136–145)
SP GR UR STRIP: 1.02 (ref 1–1.03)
SQUAMOUS #/AREA URNS HPF: ABNORMAL /HPF
TROPONIN T SERPL-MCNC: 0.05 NG/ML (ref 0–0.03)
TROPONIN T SERPL-MCNC: 0.06 NG/ML (ref 0–0.03)
UROBILINOGEN UR QL STRIP: ABNORMAL
VENTILATOR MODE: ABNORMAL
WBC # UR STRIP: ABNORMAL /HPF
WBC NRBC COR # BLD: 6.29 10*3/MM3 (ref 3.4–10.8)
WHOLE BLOOD HOLD SPECIMEN: NORMAL

## 2022-03-28 PROCEDURE — 89051 BODY FLUID CELL COUNT: CPT | Performed by: PHYSICIAN ASSISTANT

## 2022-03-28 PROCEDURE — 36600 WITHDRAWAL OF ARTERIAL BLOOD: CPT

## 2022-03-28 PROCEDURE — 74176 CT ABD & PELVIS W/O CONTRAST: CPT

## 2022-03-28 PROCEDURE — 87070 CULTURE OTHR SPECIMN AEROBIC: CPT | Performed by: PHYSICIAN ASSISTANT

## 2022-03-28 PROCEDURE — 82009 KETONE BODYS QUAL: CPT | Performed by: PHYSICIAN ASSISTANT

## 2022-03-28 PROCEDURE — 25010000002 PROCHLORPERAZINE 10 MG/2ML SOLUTION: Performed by: PHYSICIAN ASSISTANT

## 2022-03-28 PROCEDURE — 71045 X-RAY EXAM CHEST 1 VIEW: CPT | Performed by: RADIOLOGY

## 2022-03-28 PROCEDURE — 87205 SMEAR GRAM STAIN: CPT | Performed by: PHYSICIAN ASSISTANT

## 2022-03-28 PROCEDURE — 87015 SPECIMEN INFECT AGNT CONCNTJ: CPT | Performed by: PHYSICIAN ASSISTANT

## 2022-03-28 PROCEDURE — 82375 ASSAY CARBOXYHB QUANT: CPT

## 2022-03-28 PROCEDURE — 83690 ASSAY OF LIPASE: CPT | Performed by: PHYSICIAN ASSISTANT

## 2022-03-28 PROCEDURE — 85025 COMPLETE CBC W/AUTO DIFF WBC: CPT | Performed by: PHYSICIAN ASSISTANT

## 2022-03-28 PROCEDURE — 87040 BLOOD CULTURE FOR BACTERIA: CPT | Performed by: PHYSICIAN ASSISTANT

## 2022-03-28 PROCEDURE — 96374 THER/PROPH/DIAG INJ IV PUSH: CPT

## 2022-03-28 PROCEDURE — 36415 COLL VENOUS BLD VENIPUNCTURE: CPT

## 2022-03-28 PROCEDURE — 93010 ELECTROCARDIOGRAM REPORT: CPT | Performed by: INTERNAL MEDICINE

## 2022-03-28 PROCEDURE — 83050 HGB METHEMOGLOBIN QUAN: CPT

## 2022-03-28 PROCEDURE — 84145 PROCALCITONIN (PCT): CPT | Performed by: PHYSICIAN ASSISTANT

## 2022-03-28 PROCEDURE — 71045 X-RAY EXAM CHEST 1 VIEW: CPT

## 2022-03-28 PROCEDURE — 82805 BLOOD GASES W/O2 SATURATION: CPT

## 2022-03-28 PROCEDURE — 81001 URINALYSIS AUTO W/SCOPE: CPT | Performed by: PHYSICIAN ASSISTANT

## 2022-03-28 PROCEDURE — 87636 SARSCOV2 & INF A&B AMP PRB: CPT | Performed by: PHYSICIAN ASSISTANT

## 2022-03-28 PROCEDURE — 82150 ASSAY OF AMYLASE: CPT | Performed by: PHYSICIAN ASSISTANT

## 2022-03-28 PROCEDURE — 86140 C-REACTIVE PROTEIN: CPT | Performed by: PHYSICIAN ASSISTANT

## 2022-03-28 PROCEDURE — 82962 GLUCOSE BLOOD TEST: CPT

## 2022-03-28 PROCEDURE — 93005 ELECTROCARDIOGRAM TRACING: CPT | Performed by: PHYSICIAN ASSISTANT

## 2022-03-28 PROCEDURE — 83735 ASSAY OF MAGNESIUM: CPT | Performed by: PHYSICIAN ASSISTANT

## 2022-03-28 PROCEDURE — 99284 EMERGENCY DEPT VISIT MOD MDM: CPT

## 2022-03-28 PROCEDURE — 74176 CT ABD & PELVIS W/O CONTRAST: CPT | Performed by: RADIOLOGY

## 2022-03-28 PROCEDURE — 84484 ASSAY OF TROPONIN QUANT: CPT | Performed by: PHYSICIAN ASSISTANT

## 2022-03-28 PROCEDURE — 87186 SC STD MICRODIL/AGAR DIL: CPT | Performed by: PHYSICIAN ASSISTANT

## 2022-03-28 PROCEDURE — 83605 ASSAY OF LACTIC ACID: CPT | Performed by: PHYSICIAN ASSISTANT

## 2022-03-28 PROCEDURE — 80053 COMPREHEN METABOLIC PANEL: CPT | Performed by: PHYSICIAN ASSISTANT

## 2022-03-28 RX ORDER — PROCHLORPERAZINE EDISYLATE 5 MG/ML
5 INJECTION INTRAMUSCULAR; INTRAVENOUS ONCE
Status: COMPLETED | OUTPATIENT
Start: 2022-03-28 | End: 2022-03-28

## 2022-03-28 RX ORDER — POTASSIUM CHLORIDE 1.5 G/1.77G
40 POWDER, FOR SOLUTION ORAL ONCE
Status: COMPLETED | OUTPATIENT
Start: 2022-03-28 | End: 2022-03-28

## 2022-03-28 RX ORDER — SODIUM CHLORIDE 0.9 % (FLUSH) 0.9 %
10 SYRINGE (ML) INJECTION AS NEEDED
Status: DISCONTINUED | OUTPATIENT
Start: 2022-03-28 | End: 2022-03-28 | Stop reason: HOSPADM

## 2022-03-28 RX ORDER — PROCHLORPERAZINE MALEATE 5 MG/1
5 TABLET ORAL EVERY 8 HOURS PRN
Qty: 12 TABLET | Refills: 0 | Status: ON HOLD | OUTPATIENT
Start: 2022-03-28 | End: 2022-05-12

## 2022-03-28 RX ADMIN — SODIUM CHLORIDE 500 ML: 9 INJECTION, SOLUTION INTRAVENOUS at 10:39

## 2022-03-28 RX ADMIN — POTASSIUM CHLORIDE 40 MEQ: 1.5 POWDER, FOR SOLUTION ORAL at 13:02

## 2022-03-28 RX ADMIN — PROCHLORPERAZINE EDISYLATE 5 MG: 5 INJECTION INTRAMUSCULAR; INTRAVENOUS at 10:39

## 2022-03-28 RX ADMIN — SODIUM CHLORIDE 500 ML: 9 INJECTION, SOLUTION INTRAVENOUS at 14:14

## 2022-04-01 LAB
BACTERIA FLD CULT: ABNORMAL
GRAM STN SPEC: ABNORMAL

## 2022-04-02 LAB
BACTERIA SPEC AEROBE CULT: NORMAL
BACTERIA SPEC AEROBE CULT: NORMAL

## 2022-05-05 ENCOUNTER — HOSPITAL ENCOUNTER (EMERGENCY)
Facility: HOSPITAL | Age: 62
Discharge: HOME | End: 2022-05-06
Attending: EMERGENCY MEDICINE | Admitting: STUDENT IN AN ORGANIZED HEALTH CARE EDUCATION/TRAINING PROGRAM

## 2022-05-05 DIAGNOSIS — E83.42 HYPOMAGNESEMIA: ICD-10-CM

## 2022-05-05 DIAGNOSIS — E87.6 HYPOKALEMIA: Primary | ICD-10-CM

## 2022-05-05 DIAGNOSIS — R11.2 NAUSEA AND VOMITING, UNSPECIFIED VOMITING TYPE: ICD-10-CM

## 2022-05-05 LAB
ALBUMIN SERPL-MCNC: 2.63 G/DL (ref 3.5–5.2)
ALBUMIN/GLOB SERPL: 0.9 G/DL
ALP SERPL-CCNC: 100 U/L (ref 39–117)
ALT SERPL W P-5'-P-CCNC: 12 U/L (ref 1–33)
ANION GAP SERPL CALCULATED.3IONS-SCNC: 12.8 MMOL/L (ref 5–15)
AST SERPL-CCNC: 20 U/L (ref 1–32)
BASOPHILS # BLD AUTO: 0.04 10*3/MM3 (ref 0–0.2)
BASOPHILS NFR BLD AUTO: 0.7 % (ref 0–1.5)
BILIRUB SERPL-MCNC: 0.5 MG/DL (ref 0–1.2)
BUN SERPL-MCNC: 44 MG/DL (ref 8–23)
BUN/CREAT SERPL: 15.1 (ref 7–25)
CALCIUM SPEC-SCNC: 7.5 MG/DL (ref 8.6–10.5)
CHLORIDE SERPL-SCNC: 99 MMOL/L (ref 98–107)
CO2 SERPL-SCNC: 25.2 MMOL/L (ref 22–29)
CREAT SERPL-MCNC: 2.92 MG/DL (ref 0.57–1)
CRP SERPL-MCNC: 2.91 MG/DL (ref 0–0.5)
DEPRECATED RDW RBC AUTO: 43 FL (ref 37–54)
EGFRCR SERPLBLD CKD-EPI 2021: 17.4 ML/MIN/1.73
EOSINOPHIL # BLD AUTO: 0.05 10*3/MM3 (ref 0–0.4)
EOSINOPHIL NFR BLD AUTO: 0.8 % (ref 0.3–6.2)
ERYTHROCYTE [DISTWIDTH] IN BLOOD BY AUTOMATED COUNT: 13.2 % (ref 12.3–15.4)
ERYTHROCYTE [SEDIMENTATION RATE] IN BLOOD: 9 MM/HR (ref 0–30)
GLOBULIN UR ELPH-MCNC: 2.8 GM/DL
GLUCOSE SERPL-MCNC: 86 MG/DL (ref 65–99)
HCT VFR BLD AUTO: 30.4 % (ref 34–46.6)
HGB BLD-MCNC: 10.2 G/DL (ref 12–15.9)
HOLD SPECIMEN: NORMAL
HOLD SPECIMEN: NORMAL
IMM GRANULOCYTES # BLD AUTO: 0.03 10*3/MM3 (ref 0–0.05)
IMM GRANULOCYTES NFR BLD AUTO: 0.5 % (ref 0–0.5)
LYMPHOCYTES # BLD AUTO: 1.21 10*3/MM3 (ref 0.7–3.1)
LYMPHOCYTES NFR BLD AUTO: 20.1 % (ref 19.6–45.3)
MAGNESIUM SERPL-MCNC: 1.4 MG/DL (ref 1.6–2.4)
MCH RBC QN AUTO: 29.8 PG (ref 26.6–33)
MCHC RBC AUTO-ENTMCNC: 33.6 G/DL (ref 31.5–35.7)
MCV RBC AUTO: 88.9 FL (ref 79–97)
MONOCYTES # BLD AUTO: 0.4 10*3/MM3 (ref 0.1–0.9)
MONOCYTES NFR BLD AUTO: 6.6 % (ref 5–12)
NEUTROPHILS NFR BLD AUTO: 4.29 10*3/MM3 (ref 1.7–7)
NEUTROPHILS NFR BLD AUTO: 71.3 % (ref 42.7–76)
NRBC BLD AUTO-RTO: 0 /100 WBC (ref 0–0.2)
PLATELET # BLD AUTO: 160 10*3/MM3 (ref 140–450)
PMV BLD AUTO: 10 FL (ref 6–12)
POTASSIUM SERPL-SCNC: 2.3 MMOL/L (ref 3.5–5.2)
PROT SERPL-MCNC: 5.4 G/DL (ref 6–8.5)
RBC # BLD AUTO: 3.42 10*6/MM3 (ref 3.77–5.28)
SODIUM SERPL-SCNC: 137 MMOL/L (ref 136–145)
WBC NRBC COR # BLD: 6.02 10*3/MM3 (ref 3.4–10.8)
WHOLE BLOOD HOLD SPECIMEN: NORMAL
WHOLE BLOOD HOLD SPECIMEN: NORMAL

## 2022-05-05 PROCEDURE — 96365 THER/PROPH/DIAG IV INF INIT: CPT

## 2022-05-05 PROCEDURE — 96375 TX/PRO/DX INJ NEW DRUG ADDON: CPT

## 2022-05-05 PROCEDURE — 85025 COMPLETE CBC W/AUTO DIFF WBC: CPT | Performed by: NURSE PRACTITIONER

## 2022-05-05 PROCEDURE — 0 POTASSIUM CHLORIDE 10 MEQ/100ML SOLUTION: Performed by: NURSE PRACTITIONER

## 2022-05-05 PROCEDURE — 93010 ELECTROCARDIOGRAM REPORT: CPT | Performed by: INTERNAL MEDICINE

## 2022-05-05 PROCEDURE — 85652 RBC SED RATE AUTOMATED: CPT | Performed by: NURSE PRACTITIONER

## 2022-05-05 PROCEDURE — 25010000002 LORAZEPAM PER 2 MG: Performed by: NURSE PRACTITIONER

## 2022-05-05 PROCEDURE — 25010000002 MAGNESIUM SULFATE IN D5W 1G/100ML (PREMIX) 1-5 GM/100ML-% SOLUTION: Performed by: NURSE PRACTITIONER

## 2022-05-05 PROCEDURE — 25010000002 HYDRALAZINE PER 20 MG: Performed by: NURSE PRACTITIONER

## 2022-05-05 PROCEDURE — 93005 ELECTROCARDIOGRAM TRACING: CPT | Performed by: NURSE PRACTITIONER

## 2022-05-05 PROCEDURE — 36415 COLL VENOUS BLD VENIPUNCTURE: CPT

## 2022-05-05 PROCEDURE — 86140 C-REACTIVE PROTEIN: CPT | Performed by: NURSE PRACTITIONER

## 2022-05-05 PROCEDURE — 96367 TX/PROPH/DG ADDL SEQ IV INF: CPT

## 2022-05-05 PROCEDURE — 80053 COMPREHEN METABOLIC PANEL: CPT | Performed by: NURSE PRACTITIONER

## 2022-05-05 PROCEDURE — 25010000002 CALCIUM GLUCONATE PER 10 ML: Performed by: NURSE PRACTITIONER

## 2022-05-05 PROCEDURE — 99284 EMERGENCY DEPT VISIT MOD MDM: CPT

## 2022-05-05 PROCEDURE — 25010000002 HYDROMORPHONE PER 4 MG: Performed by: NURSE PRACTITIONER

## 2022-05-05 PROCEDURE — 25010000002 PROCHLORPERAZINE 10 MG/2ML SOLUTION: Performed by: NURSE PRACTITIONER

## 2022-05-05 PROCEDURE — 83735 ASSAY OF MAGNESIUM: CPT | Performed by: NURSE PRACTITIONER

## 2022-05-05 RX ORDER — HYDRALAZINE HYDROCHLORIDE 20 MG/ML
10 INJECTION INTRAMUSCULAR; INTRAVENOUS ONCE
Status: COMPLETED | OUTPATIENT
Start: 2022-05-05 | End: 2022-05-05

## 2022-05-05 RX ORDER — LORAZEPAM 2 MG/ML
1 INJECTION INTRAMUSCULAR ONCE
Status: COMPLETED | OUTPATIENT
Start: 2022-05-05 | End: 2022-05-05

## 2022-05-05 RX ORDER — HYDROMORPHONE HYDROCHLORIDE 1 MG/ML
0.5 INJECTION, SOLUTION INTRAMUSCULAR; INTRAVENOUS; SUBCUTANEOUS ONCE
Status: COMPLETED | OUTPATIENT
Start: 2022-05-05 | End: 2022-05-05

## 2022-05-05 RX ORDER — SODIUM CHLORIDE 0.9 % (FLUSH) 0.9 %
10 SYRINGE (ML) INJECTION AS NEEDED
Status: DISCONTINUED | OUTPATIENT
Start: 2022-05-05 | End: 2022-05-06 | Stop reason: HOSPADM

## 2022-05-05 RX ORDER — HYDROCODONE BITARTRATE AND ACETAMINOPHEN 7.5; 325 MG/1; MG/1
1 TABLET ORAL ONCE
Status: COMPLETED | OUTPATIENT
Start: 2022-05-05 | End: 2022-05-05

## 2022-05-05 RX ORDER — MAGNESIUM SULFATE 1 G/100ML
1 INJECTION INTRAVENOUS ONCE
Status: COMPLETED | OUTPATIENT
Start: 2022-05-05 | End: 2022-05-05

## 2022-05-05 RX ORDER — POTASSIUM CHLORIDE 20 MEQ/1
40 TABLET, EXTENDED RELEASE ORAL ONCE
Status: COMPLETED | OUTPATIENT
Start: 2022-05-05 | End: 2022-05-05

## 2022-05-05 RX ORDER — PROCHLORPERAZINE EDISYLATE 5 MG/ML
10 INJECTION INTRAMUSCULAR; INTRAVENOUS ONCE
Status: COMPLETED | OUTPATIENT
Start: 2022-05-05 | End: 2022-05-05

## 2022-05-05 RX ORDER — POTASSIUM CHLORIDE 7.45 MG/ML
10 INJECTION INTRAVENOUS
Status: DISCONTINUED | OUTPATIENT
Start: 2022-05-05 | End: 2022-05-05 | Stop reason: ALTCHOICE

## 2022-05-05 RX ORDER — POTASSIUM CHLORIDE 7.45 MG/ML
10 INJECTION INTRAVENOUS
Status: DISCONTINUED | OUTPATIENT
Start: 2022-05-05 | End: 2022-05-06 | Stop reason: HOSPADM

## 2022-05-05 RX ADMIN — POTASSIUM CHLORIDE 40 MEQ: 20 TABLET, EXTENDED RELEASE ORAL at 20:16

## 2022-05-05 RX ADMIN — POTASSIUM CHLORIDE 10 MEQ: 7.46 INJECTION, SOLUTION INTRAVENOUS at 22:31

## 2022-05-05 RX ADMIN — HYDRALAZINE HYDROCHLORIDE 10 MG: 20 INJECTION INTRAMUSCULAR; INTRAVENOUS at 23:20

## 2022-05-05 RX ADMIN — HYDROMORPHONE HYDROCHLORIDE 0.5 MG: 1 INJECTION, SOLUTION INTRAMUSCULAR; INTRAVENOUS; SUBCUTANEOUS at 20:17

## 2022-05-05 RX ADMIN — LORAZEPAM 1 MG: 2 INJECTION INTRAMUSCULAR; INTRAVENOUS at 23:22

## 2022-05-05 RX ADMIN — POTASSIUM CHLORIDE 10 MEQ: 7.46 INJECTION, SOLUTION INTRAVENOUS at 23:25

## 2022-05-05 RX ADMIN — POTASSIUM CHLORIDE 10 MEQ: 7.46 INJECTION, SOLUTION INTRAVENOUS at 20:41

## 2022-05-05 RX ADMIN — HYDROCODONE BITARTRATE AND ACETAMINOPHEN 1 TABLET: 7.5; 325 TABLET ORAL at 23:18

## 2022-05-05 RX ADMIN — MAGNESIUM SULFATE IN DEXTROSE 1 G: 10 INJECTION, SOLUTION INTRAVENOUS at 20:40

## 2022-05-05 RX ADMIN — PROCHLORPERAZINE EDISYLATE 10 MG: 5 INJECTION INTRAMUSCULAR; INTRAVENOUS at 20:16

## 2022-05-05 RX ADMIN — SODIUM CHLORIDE 500 ML: 9 INJECTION, SOLUTION INTRAVENOUS at 20:19

## 2022-05-05 RX ADMIN — CALCIUM GLUCONATE 2 G: 98 INJECTION, SOLUTION INTRAVENOUS at 21:49

## 2022-05-06 VITALS
HEIGHT: 62 IN | RESPIRATION RATE: 16 BRPM | HEART RATE: 78 BPM | TEMPERATURE: 98.6 F | WEIGHT: 111 LBS | BODY MASS INDEX: 20.43 KG/M2 | SYSTOLIC BLOOD PRESSURE: 167 MMHG | OXYGEN SATURATION: 96 % | DIASTOLIC BLOOD PRESSURE: 72 MMHG

## 2022-05-06 LAB
ANION GAP SERPL CALCULATED.3IONS-SCNC: 2.6 MMOL/L (ref 5–15)
BUN SERPL-MCNC: 43 MG/DL (ref 8–23)
BUN/CREAT SERPL: 14.7 (ref 7–25)
CALCIUM SPEC-SCNC: 7.4 MG/DL (ref 8.6–10.5)
CHLORIDE SERPL-SCNC: 108 MMOL/L (ref 98–107)
CO2 SERPL-SCNC: 23.4 MMOL/L (ref 22–29)
CREAT SERPL-MCNC: 2.93 MG/DL (ref 0.57–1)
EGFRCR SERPLBLD CKD-EPI 2021: 17.4 ML/MIN/1.73
GLUCOSE SERPL-MCNC: 83 MG/DL (ref 65–99)
POTASSIUM SERPL-SCNC: 2.9 MMOL/L (ref 3.5–5.2)
POTASSIUM SERPL-SCNC: 3.3 MMOL/L (ref 3.5–5.2)
QT INTERVAL: 486 MS
QTC INTERVAL: 542 MS
SODIUM SERPL-SCNC: 134 MMOL/L (ref 136–145)

## 2022-05-06 PROCEDURE — 80048 BASIC METABOLIC PNL TOTAL CA: CPT | Performed by: NURSE PRACTITIONER

## 2022-05-06 PROCEDURE — 84132 ASSAY OF SERUM POTASSIUM: CPT | Performed by: NURSE PRACTITIONER

## 2022-05-06 PROCEDURE — 25010000002 MORPHINE PER 10 MG: Performed by: NURSE PRACTITIONER

## 2022-05-06 PROCEDURE — 96367 TX/PROPH/DG ADDL SEQ IV INF: CPT

## 2022-05-06 PROCEDURE — 25010000002 HYDRALAZINE PER 20 MG: Performed by: NURSE PRACTITIONER

## 2022-05-06 PROCEDURE — 0 POTASSIUM CHLORIDE 10 MEQ/100ML SOLUTION: Performed by: NURSE PRACTITIONER

## 2022-05-06 PROCEDURE — 25010000002 ONDANSETRON PER 1 MG: Performed by: NURSE PRACTITIONER

## 2022-05-06 PROCEDURE — 96375 TX/PRO/DX INJ NEW DRUG ADDON: CPT

## 2022-05-06 PROCEDURE — 96376 TX/PRO/DX INJ SAME DRUG ADON: CPT

## 2022-05-06 RX ORDER — HYDRALAZINE HYDROCHLORIDE 20 MG/ML
10 INJECTION INTRAMUSCULAR; INTRAVENOUS ONCE
Status: COMPLETED | OUTPATIENT
Start: 2022-05-06 | End: 2022-05-06

## 2022-05-06 RX ORDER — ONDANSETRON 2 MG/ML
4 INJECTION INTRAMUSCULAR; INTRAVENOUS ONCE
Status: COMPLETED | OUTPATIENT
Start: 2022-05-06 | End: 2022-05-06

## 2022-05-06 RX ORDER — MORPHINE SULFATE 2 MG/ML
2 INJECTION, SOLUTION INTRAMUSCULAR; INTRAVENOUS ONCE
Status: COMPLETED | OUTPATIENT
Start: 2022-05-06 | End: 2022-05-06

## 2022-05-06 RX ORDER — POTASSIUM CHLORIDE 20 MEQ/1
40 TABLET, EXTENDED RELEASE ORAL ONCE
Status: COMPLETED | OUTPATIENT
Start: 2022-05-06 | End: 2022-05-06

## 2022-05-06 RX ADMIN — POTASSIUM CHLORIDE 10 MEQ: 7.46 INJECTION, SOLUTION INTRAVENOUS at 01:27

## 2022-05-06 RX ADMIN — POTASSIUM CHLORIDE 10 MEQ: 7.46 INJECTION, SOLUTION INTRAVENOUS at 02:35

## 2022-05-06 RX ADMIN — POTASSIUM CHLORIDE 40 MEQ: 20 TABLET, EXTENDED RELEASE ORAL at 04:11

## 2022-05-06 RX ADMIN — HYDRALAZINE HYDROCHLORIDE 10 MG: 20 INJECTION INTRAMUSCULAR; INTRAVENOUS at 04:36

## 2022-05-06 RX ADMIN — MORPHINE SULFATE 2 MG: 2 INJECTION, SOLUTION INTRAMUSCULAR; INTRAVENOUS at 04:37

## 2022-05-06 RX ADMIN — ONDANSETRON 4 MG: 2 INJECTION INTRAMUSCULAR; INTRAVENOUS at 04:11

## 2022-05-06 RX ADMIN — POTASSIUM CHLORIDE 10 MEQ: 7.46 INJECTION, SOLUTION INTRAVENOUS at 00:26

## 2022-05-06 NOTE — ED PROVIDER NOTES
Subjective   Patient is a 62-year-old female with significant past medical history positive for depression, GERD, hyperlipidemia, PAD, hypertension, end-stage renal disease with peritoneal dialysis presenting to the ER complaints of low potassium nausea and vomiting.  Patient states that 1 day ago she started having nausea and vomiting and was unable to hold much down.  Patient saw her PCP this morning and had lab work done and PCP called her back after she had left and sent her here to the ER for potassium replacement.  Patient states that she is feeling little better as far as the nausea and was able to hold down a little bit of water.  Patient denies abdominal pain cough fever shortness of breath or any additional symptoms today.      History provided by:  Patient   used: No        Review of Systems   Constitutional: Negative.  Negative for fever.        Low potassium   HENT: Negative.    Respiratory: Negative.    Cardiovascular: Negative.  Negative for chest pain.   Gastrointestinal: Positive for nausea and vomiting. Negative for abdominal pain.   Endocrine: Negative.    Genitourinary: Negative.  Negative for dysuria.   Skin: Negative.    Neurological: Negative.    Psychiatric/Behavioral: Negative.    All other systems reviewed and are negative.      Past Medical History:   Diagnosis Date   • Arthritis    • Closed fracture of right fibula and tibia 12/25/2018   • Depression    • Diabetic ulcer of left foot associated with type 2 diabetes mellitus ([facility]) 6/23/2017   • Diastolic dysfunction    • Disease of thyroid gland    • Elevated cholesterol    • End stage renal disease ([facility])    • Essential hypertension    • GERD (gastroesophageal reflux disease)    • History of transfusion    • Hyperlipidemia    • Iron deficiency anemia 12/30/2018   • PAD (peripheral artery disease) ([facility]    • Renal failure    • Type 2 diabetes mellitus with hyperglycemia, with long-term current use of insulin ([facility])  2018       Allergies   Allergen Reactions   • Penicillins Hives and GI Intolerance     Patient has received cefazolin, ceftriaxone and cefepime during past admissions.   • Codeine Hives, Rash and GI Intolerance     Pt tolerates Norco @ home   • Sulfa Antibiotics Hives, Rash and GI Intolerance     NAUSEA TOO       Past Surgical History:   Procedure Laterality Date   • ABDOMINAL SURGERY     • BACK SURGERY      Post spinal diskectomy, osteophytectomy in one lumbar interspace   • CATARACT EXTRACTION      Left    •  SECTION     • DENTAL PROCEDURE     • ENDOSCOPY     • FRACTURE SURGERY      right leg   • HYSTERECTOMY     • INCISION AND DRAINAGE LEG Left 4/15/2017    Procedure: INCISION AND DRAINAGE LOWER EXTREMITY;  Surgeon: Basilio Morris MD;  Location: [facility] OR;  Service:    • INCISION AND DRAINAGE LEG Left 2017    Procedure: Irrigation and Debridment abcess diabetic wound left foot with deep culture;  Surgeon: Basilio Morris MD;  Location: [facility] OR;  Service:    • INSERTION PERITONEAL DIALYSIS CATHETER N/A 2021    Procedure: INSERTION PERITONEAL DIALYSIS CATHETER LAPAROSCOPIC;  Surgeon: Edy Glover MD;  Location: [facility] OR;  Service: General;  Laterality: N/A;   • KNEE ARTHROSCOPY Right    • ORIF TIBIA FRACTURE Right 2018    Procedure: TIBIAL  FRACTURE OPEN REDUCTION INTERNAL FIXATION;  Surgeon: Jung Barragan MD;  Location: [facility] OR;  Service: Orthopedics   • TOE AMPUTATION Right     5th   • TUBAL ABDOMINAL LIGATION         Family History   Problem Relation Age of Onset   • Heart disease Mother    • Cancer Mother    • COPD Mother    • Diabetes Other    • Depression Other        Social History     Socioeconomic History   • Marital status:    Tobacco Use   • Smoking status: Never Smoker   • Smokeless tobacco: Never Used   Vaping Use   • Vaping Use: Never used   Substance and Sexual Activity   • Alcohol use: No   • Drug use: No   • Sexual activity: Defer            Objective   Physical Exam  Vitals and nursing note reviewed.   Constitutional:       General: She is not in acute distress.     Appearance: Normal appearance. She is well-developed. She is not ill-appearing, toxic-appearing or diaphoretic.   HENT:      Head: Normocephalic and atraumatic.      Right Ear: External ear normal.      Left Ear: External ear normal.      Nose: Nose normal.   Eyes:      Conjunctiva/sclera: Conjunctivae normal.      Pupils: Pupils are equal, round, and reactive to light.   Neck:      Vascular: No JVD.      Trachea: No tracheal deviation.   Cardiovascular:      Rate and Rhythm: Normal rate and regular rhythm.      Heart sounds: Normal heart sounds. No murmur heard.  Pulmonary:      Effort: Pulmonary effort is normal. No respiratory distress.      Breath sounds: Normal breath sounds. No wheezing.   Abdominal:      General: Bowel sounds are normal. There is distension.      Palpations: Abdomen is soft.      Tenderness: There is no abdominal tenderness.   Musculoskeletal:         General: No swelling, tenderness, deformity or signs of injury. Normal range of motion.      Cervical back: Normal range of motion and neck supple.   Skin:     General: Skin is warm and dry.      Capillary Refill: Capillary refill takes less than 2 seconds.      Coloration: Skin is not pale.      Findings: No erythema or rash.   Neurological:      General: No focal deficit present.      Mental Status: She is alert and oriented to person, place, and time. Mental status is at baseline.      Cranial Nerves: No cranial nerve deficit.   Psychiatric:         Mood and Affect: Mood normal.         Behavior: Behavior normal.         Thought Content: Thought content normal.         Judgment: Judgment normal.         Procedures           ED Course  ED Course as of 05/08/22 2225   Thu May 05, 2022   2000 EKG noted sinus rhythm.  75 bpm.  .  .  QTc 542.  Prolonged QT noted.  No acute ST elevation [SF]   2008  Potassium(!!): 2.3 [SM]   2008 Calcium(!): 7.5 [SM]   2008 Magnesium(!): 1.4 [SM]   Fri May 06, 2022   0203 Waiting for completion of potassium replacement and then will recheck. BMP at 0052 revealed a K+ of 2.9. Pt stable, no complaints. [MB]      ED Course User Index  [MB] Shania Bey, DARIUS  [SF] Ajay Chavez DO  [SM] Beverly Galarza, APRMARIE                                                 [facility]    Final diagnoses:   Hypokalemia   Hypomagnesemia   Nausea and vomiting, unspecified vomiting type       ED Disposition  ED Disposition     ED Disposition   Discharge    Condition   Stable    Comment   --             Susan Stephens, DARIUS  [address] [id number] [phone]    Call in 2 days           Medication List      No changes were made to your prescriptions during this visit.          Beverly Galarza, DARIUS  05/08/22 5503

## 2022-05-11 ENCOUNTER — APPOINTMENT (OUTPATIENT)
Dept: MRI IMAGING | Facility: HOSPITAL | Age: 62
End: 2022-05-11

## 2022-05-11 ENCOUNTER — APPOINTMENT (OUTPATIENT)
Dept: CT IMAGING | Facility: HOSPITAL | Age: 62
End: 2022-05-11

## 2022-05-11 ENCOUNTER — HOSPITAL ENCOUNTER (INPATIENT)
Facility: HOSPITAL | Age: 62
LOS: 8 days | Discharge: HOME | End: 2022-05-19
Attending: STUDENT IN AN ORGANIZED HEALTH CARE EDUCATION/TRAINING PROGRAM | Admitting: STUDENT IN AN ORGANIZED HEALTH CARE EDUCATION/TRAINING PROGRAM

## 2022-05-11 ENCOUNTER — APPOINTMENT (OUTPATIENT)
Dept: GENERAL RADIOLOGY | Facility: HOSPITAL | Age: 62
End: 2022-05-11

## 2022-05-11 DIAGNOSIS — S06.5XAA SUBDURAL HEMATOMA: Primary | ICD-10-CM

## 2022-05-11 DIAGNOSIS — E43 SEVERE MALNUTRITION: ICD-10-CM

## 2022-05-11 LAB
ALBUMIN SERPL-MCNC: 3.23 G/DL (ref 3.5–5.2)
ALBUMIN/GLOB SERPL: 1.1 G/DL
ALP SERPL-CCNC: 92 U/L (ref 39–117)
ALT SERPL W P-5'-P-CCNC: 16 U/L (ref 1–33)
AMMONIA BLD-SCNC: 10 UMOL/L (ref 11–51)
AMPHET+METHAMPHET UR QL: NEGATIVE
AMPHETAMINES UR QL: NEGATIVE
ANION GAP SERPL CALCULATED.3IONS-SCNC: 11.5 MMOL/L (ref 5–15)
APAP SERPL-MCNC: <5 MCG/ML (ref 0–30)
AST SERPL-CCNC: 30 U/L (ref 1–32)
BACTERIA UR QL AUTO: NORMAL /HPF
BARBITURATES UR QL SCN: NEGATIVE
BASOPHILS # BLD AUTO: 0.05 10*3/MM3 (ref 0–0.2)
BASOPHILS NFR BLD AUTO: 0.9 % (ref 0–1.5)
BENZODIAZ UR QL SCN: POSITIVE
BILIRUB SERPL-MCNC: 0.4 MG/DL (ref 0–1.2)
BILIRUB UR QL STRIP: NEGATIVE
BUN SERPL-MCNC: 20 MG/DL (ref 8–23)
BUN/CREAT SERPL: 5.3 (ref 7–25)
BUPRENORPHINE SERPL-MCNC: NEGATIVE NG/ML
CALCIUM SPEC-SCNC: 8.6 MG/DL (ref 8.6–10.5)
CANNABINOIDS SERPL QL: NEGATIVE
CHLORIDE SERPL-SCNC: 100 MMOL/L (ref 98–107)
CLARITY UR: CLEAR
CO2 SERPL-SCNC: 27.5 MMOL/L (ref 22–29)
COCAINE UR QL: NEGATIVE
COLOR UR: YELLOW
CREAT SERPL-MCNC: 3.77 MG/DL (ref 0.57–1)
D-LACTATE SERPL-SCNC: 0.9 MMOL/L (ref 0.5–2)
DEPRECATED RDW RBC AUTO: 46.1 FL (ref 37–54)
EGFRCR SERPLBLD CKD-EPI 2021: 12.8 ML/MIN/1.73
EOSINOPHIL # BLD AUTO: 0.12 10*3/MM3 (ref 0–0.4)
EOSINOPHIL NFR BLD AUTO: 2.2 % (ref 0.3–6.2)
ERYTHROCYTE [DISTWIDTH] IN BLOOD BY AUTOMATED COUNT: 13.6 % (ref 12.3–15.4)
ETHANOL BLD-MCNC: <10 MG/DL (ref 0–10)
ETHANOL UR QL: <0.01 %
FLUAV RNA RESP QL NAA+PROBE: NOT DETECTED
FLUBV RNA RESP QL NAA+PROBE: NOT DETECTED
GLOBULIN UR ELPH-MCNC: 2.9 GM/DL
GLUCOSE BLDC GLUCOMTR-MCNC: 176 MG/DL (ref 70–130)
GLUCOSE BLDC GLUCOMTR-MCNC: 209 MG/DL (ref 70–130)
GLUCOSE SERPL-MCNC: 185 MG/DL (ref 65–99)
GLUCOSE UR STRIP-MCNC: ABNORMAL MG/DL
HAV IGM SERPL QL IA: NORMAL
HBA1C MFR BLD: 6 % (ref 4.8–5.6)
HBV CORE IGM SERPL QL IA: NORMAL
HBV SURFACE AG SERPL QL IA: NORMAL
HCT VFR BLD AUTO: 31.3 % (ref 34–46.6)
HCV AB SER DONR QL: NORMAL
HGB BLD-MCNC: 9.9 G/DL (ref 12–15.9)
HGB UR QL STRIP.AUTO: NEGATIVE
HOLD SPECIMEN: NORMAL
HOLD SPECIMEN: NORMAL
HYALINE CASTS UR QL AUTO: NORMAL /LPF
IMM GRANULOCYTES # BLD AUTO: 0.01 10*3/MM3 (ref 0–0.05)
IMM GRANULOCYTES NFR BLD AUTO: 0.2 % (ref 0–0.5)
KETONES UR QL STRIP: NEGATIVE
LEUKOCYTE ESTERASE UR QL STRIP.AUTO: NEGATIVE
LYMPHOCYTES # BLD AUTO: 1.46 10*3/MM3 (ref 0.7–3.1)
LYMPHOCYTES NFR BLD AUTO: 26.7 % (ref 19.6–45.3)
MAGNESIUM SERPL-MCNC: 1.9 MG/DL (ref 1.6–2.4)
MCH RBC QN AUTO: 29.6 PG (ref 26.6–33)
MCHC RBC AUTO-ENTMCNC: 31.6 G/DL (ref 31.5–35.7)
MCV RBC AUTO: 93.7 FL (ref 79–97)
METHADONE UR QL SCN: NEGATIVE
MONOCYTES # BLD AUTO: 0.29 10*3/MM3 (ref 0.1–0.9)
MONOCYTES NFR BLD AUTO: 5.3 % (ref 5–12)
NEUTROPHILS NFR BLD AUTO: 3.54 10*3/MM3 (ref 1.7–7)
NEUTROPHILS NFR BLD AUTO: 64.7 % (ref 42.7–76)
NITRITE UR QL STRIP: NEGATIVE
NRBC BLD AUTO-RTO: 0 /100 WBC (ref 0–0.2)
OPIATES UR QL: NEGATIVE
OXYCODONE UR QL SCN: NEGATIVE
PCP UR QL SCN: NEGATIVE
PH UR STRIP.AUTO: 8.5 [PH] (ref 5–8)
PLATELET # BLD AUTO: 184 10*3/MM3 (ref 140–450)
PMV BLD AUTO: 9.8 FL (ref 6–12)
POTASSIUM SERPL-SCNC: 5.2 MMOL/L (ref 3.5–5.2)
PROPOXYPH UR QL: NEGATIVE
PROT SERPL-MCNC: 6.1 G/DL (ref 6–8.5)
PROT UR QL STRIP: ABNORMAL
RBC # BLD AUTO: 3.34 10*6/MM3 (ref 3.77–5.28)
RBC # UR STRIP: NORMAL /HPF
REF LAB TEST METHOD: NORMAL
SALICYLATES SERPL-MCNC: <0.3 MG/DL
SARS-COV-2 RNA RESP QL NAA+PROBE: NOT DETECTED
SODIUM SERPL-SCNC: 139 MMOL/L (ref 136–145)
SP GR UR STRIP: 1.01 (ref 1–1.03)
SQUAMOUS #/AREA URNS HPF: NORMAL /HPF
T4 FREE SERPL-MCNC: 1.05 NG/DL (ref 0.93–1.7)
TRICYCLICS UR QL SCN: POSITIVE
UROBILINOGEN UR QL STRIP: ABNORMAL
WBC # UR STRIP: NORMAL /HPF
WBC NRBC COR # BLD: 5.47 10*3/MM3 (ref 3.4–10.8)
WHOLE BLOOD HOLD COAG: NORMAL
WHOLE BLOOD HOLD SPECIMEN: NORMAL

## 2022-05-11 PROCEDURE — 99284 EMERGENCY DEPT VISIT MOD MDM: CPT

## 2022-05-11 PROCEDURE — 81001 URINALYSIS AUTO W/SCOPE: CPT | Performed by: NURSE PRACTITIONER

## 2022-05-11 PROCEDURE — P9612 CATHETERIZE FOR URINE SPEC: HCPCS

## 2022-05-11 PROCEDURE — 87040 BLOOD CULTURE FOR BACTERIA: CPT | Performed by: NURSE PRACTITIONER

## 2022-05-11 PROCEDURE — 85025 COMPLETE CBC W/AUTO DIFF WBC: CPT | Performed by: NURSE PRACTITIONER

## 2022-05-11 PROCEDURE — 80074 ACUTE HEPATITIS PANEL: CPT | Performed by: INTERNAL MEDICINE

## 2022-05-11 PROCEDURE — 3E1M39Z IRRIGATION OF PERITONEAL CAVITY USING DIALYSATE, PERCUTANEOUS APPROACH: ICD-10-PCS | Performed by: STUDENT IN AN ORGANIZED HEALTH CARE EDUCATION/TRAINING PROGRAM

## 2022-05-11 PROCEDURE — 80179 DRUG ASSAY SALICYLATE: CPT | Performed by: NURSE PRACTITIONER

## 2022-05-11 PROCEDURE — 83605 ASSAY OF LACTIC ACID: CPT | Performed by: NURSE PRACTITIONER

## 2022-05-11 PROCEDURE — 70551 MRI BRAIN STEM W/O DYE: CPT | Performed by: RADIOLOGY

## 2022-05-11 PROCEDURE — 70450 CT HEAD/BRAIN W/O DYE: CPT | Performed by: RADIOLOGY

## 2022-05-11 PROCEDURE — 99223 1ST HOSP IP/OBS HIGH 75: CPT | Performed by: HOSPITALIST

## 2022-05-11 PROCEDURE — 80306 DRUG TEST PRSMV INSTRMNT: CPT | Performed by: NURSE PRACTITIONER

## 2022-05-11 PROCEDURE — 84481 FREE ASSAY (FT-3): CPT | Performed by: HOSPITALIST

## 2022-05-11 PROCEDURE — 82962 GLUCOSE BLOOD TEST: CPT

## 2022-05-11 PROCEDURE — 71045 X-RAY EXAM CHEST 1 VIEW: CPT | Performed by: RADIOLOGY

## 2022-05-11 PROCEDURE — 71045 X-RAY EXAM CHEST 1 VIEW: CPT

## 2022-05-11 PROCEDURE — 83036 HEMOGLOBIN GLYCOSYLATED A1C: CPT | Performed by: HOSPITALIST

## 2022-05-11 PROCEDURE — 83735 ASSAY OF MAGNESIUM: CPT | Performed by: NURSE PRACTITIONER

## 2022-05-11 PROCEDURE — 25010000002 HYDRALAZINE PER 20 MG: Performed by: NURSE PRACTITIONER

## 2022-05-11 PROCEDURE — 80053 COMPREHEN METABOLIC PANEL: CPT | Performed by: NURSE PRACTITIONER

## 2022-05-11 PROCEDURE — 70450 CT HEAD/BRAIN W/O DYE: CPT

## 2022-05-11 PROCEDURE — 70551 MRI BRAIN STEM W/O DYE: CPT

## 2022-05-11 PROCEDURE — 87636 SARSCOV2 & INF A&B AMP PRB: CPT | Performed by: STUDENT IN AN ORGANIZED HEALTH CARE EDUCATION/TRAINING PROGRAM

## 2022-05-11 PROCEDURE — 82077 ASSAY SPEC XCP UR&BREATH IA: CPT | Performed by: NURSE PRACTITIONER

## 2022-05-11 PROCEDURE — 84439 ASSAY OF FREE THYROXINE: CPT | Performed by: HOSPITALIST

## 2022-05-11 PROCEDURE — 80143 DRUG ASSAY ACETAMINOPHEN: CPT | Performed by: NURSE PRACTITIONER

## 2022-05-11 PROCEDURE — 82140 ASSAY OF AMMONIA: CPT | Performed by: NURSE PRACTITIONER

## 2022-05-11 RX ORDER — DEXTROSE MONOHYDRATE 25 G/50ML
25 INJECTION, SOLUTION INTRAVENOUS
Status: DISCONTINUED | OUTPATIENT
Start: 2022-05-11 | End: 2022-05-19 | Stop reason: HOSPADM

## 2022-05-11 RX ORDER — NITROGLYCERIN 0.4 MG/1
0.4 TABLET SUBLINGUAL
Status: DISCONTINUED | OUTPATIENT
Start: 2022-05-11 | End: 2022-05-19 | Stop reason: HOSPADM

## 2022-05-11 RX ORDER — HYDROXYZINE HYDROCHLORIDE 25 MG/1
25 TABLET, FILM COATED ORAL ONCE
Status: COMPLETED | OUTPATIENT
Start: 2022-05-11 | End: 2022-05-11

## 2022-05-11 RX ORDER — INSULIN ASPART 100 [IU]/ML
0-7 INJECTION, SOLUTION INTRAVENOUS; SUBCUTANEOUS
Status: DISCONTINUED | OUTPATIENT
Start: 2022-05-12 | End: 2022-05-19 | Stop reason: HOSPADM

## 2022-05-11 RX ORDER — CLONIDINE HYDROCHLORIDE 0.1 MG/1
0.2 TABLET ORAL ONCE
Status: COMPLETED | OUTPATIENT
Start: 2022-05-11 | End: 2022-05-11

## 2022-05-11 RX ORDER — BUSPIRONE HYDROCHLORIDE 10 MG/1
10 TABLET ORAL 2 TIMES DAILY
Status: ON HOLD | COMMUNITY
End: 2022-05-12

## 2022-05-11 RX ORDER — HYDRALAZINE HYDROCHLORIDE 50 MG/1
50 TABLET, FILM COATED ORAL 2 TIMES DAILY
Status: DISCONTINUED | OUTPATIENT
Start: 2022-05-11 | End: 2022-05-13

## 2022-05-11 RX ORDER — HEPARIN SODIUM 5000 [USP'U]/ML
5000 INJECTION, SOLUTION INTRAVENOUS; SUBCUTANEOUS EVERY 12 HOURS SCHEDULED
Status: DISCONTINUED | OUTPATIENT
Start: 2022-05-11 | End: 2022-05-11

## 2022-05-11 RX ORDER — SODIUM CHLORIDE 0.9 % (FLUSH) 0.9 %
10 SYRINGE (ML) INJECTION EVERY 12 HOURS SCHEDULED
Status: DISCONTINUED | OUTPATIENT
Start: 2022-05-11 | End: 2022-05-19 | Stop reason: HOSPADM

## 2022-05-11 RX ORDER — SODIUM CHLORIDE 0.9 % (FLUSH) 0.9 %
10 SYRINGE (ML) INJECTION AS NEEDED
Status: DISCONTINUED | OUTPATIENT
Start: 2022-05-11 | End: 2022-05-19 | Stop reason: HOSPADM

## 2022-05-11 RX ORDER — LISINOPRIL 20 MG/1
20 TABLET ORAL
Status: ON HOLD | COMMUNITY
End: 2022-05-19 | Stop reason: SDUPTHER

## 2022-05-11 RX ORDER — GABAPENTIN 300 MG/1
300 CAPSULE ORAL DAILY
COMMUNITY

## 2022-05-11 RX ORDER — INSULIN GLARGINE 100 [IU]/ML
10 INJECTION, SOLUTION SUBCUTANEOUS NIGHTLY
Status: ON HOLD | COMMUNITY
End: 2022-07-02

## 2022-05-11 RX ORDER — HYDRALAZINE HYDROCHLORIDE 20 MG/ML
20 INJECTION INTRAMUSCULAR; INTRAVENOUS ONCE
Status: COMPLETED | OUTPATIENT
Start: 2022-05-11 | End: 2022-05-11

## 2022-05-11 RX ORDER — CARVEDILOL 6.25 MG/1
12.5 TABLET ORAL 2 TIMES DAILY WITH MEALS
Status: DISCONTINUED | OUTPATIENT
Start: 2022-05-11 | End: 2022-05-15

## 2022-05-11 RX ORDER — NICOTINE POLACRILEX 4 MG
15 LOZENGE BUCCAL
Status: DISCONTINUED | OUTPATIENT
Start: 2022-05-11 | End: 2022-05-19 | Stop reason: HOSPADM

## 2022-05-11 RX ORDER — HYDRALAZINE HYDROCHLORIDE 50 MG/1
50 TABLET, FILM COATED ORAL 2 TIMES DAILY
COMMUNITY
End: 2022-05-19 | Stop reason: HOSPADM

## 2022-05-11 RX ADMIN — HYDRALAZINE HYDROCHLORIDE 20 MG: 20 INJECTION INTRAMUSCULAR; INTRAVENOUS at 18:24

## 2022-05-11 RX ADMIN — HYDROXYZINE HYDROCHLORIDE 25 MG: 25 TABLET ORAL at 18:24

## 2022-05-11 RX ADMIN — CLONIDINE HYDROCHLORIDE 0.2 MG: 0.1 TABLET ORAL at 15:59

## 2022-05-12 ENCOUNTER — APPOINTMENT (OUTPATIENT)
Dept: CT IMAGING | Facility: HOSPITAL | Age: 62
End: 2022-05-12

## 2022-05-12 ENCOUNTER — APPOINTMENT (OUTPATIENT)
Dept: RESPIRATORY THERAPY | Facility: HOSPITAL | Age: 62
End: 2022-05-12

## 2022-05-12 LAB
GLUCOSE BLDC GLUCOMTR-MCNC: 186 MG/DL (ref 70–130)
GLUCOSE BLDC GLUCOMTR-MCNC: 203 MG/DL (ref 70–130)
GLUCOSE BLDC GLUCOMTR-MCNC: 336 MG/DL (ref 70–130)
GLUCOSE BLDC GLUCOMTR-MCNC: 62 MG/DL (ref 70–130)
GLUCOSE BLDC GLUCOMTR-MCNC: 70 MG/DL (ref 70–130)
GLUCOSE BLDC GLUCOMTR-MCNC: 81 MG/DL (ref 70–130)
T3FREE SERPL-MCNC: 2.06 PG/ML (ref 2–4.4)

## 2022-05-12 PROCEDURE — 63710000001 INSULIN ASPART PER 5 UNITS: Performed by: HOSPITALIST

## 2022-05-12 PROCEDURE — 99223 1ST HOSP IP/OBS HIGH 75: CPT | Performed by: PSYCHIATRY & NEUROLOGY

## 2022-05-12 PROCEDURE — 82962 GLUCOSE BLOOD TEST: CPT

## 2022-05-12 PROCEDURE — 63710000001 INSULIN DETEMIR PER 5 UNITS: Performed by: STUDENT IN AN ORGANIZED HEALTH CARE EDUCATION/TRAINING PROGRAM

## 2022-05-12 PROCEDURE — 70450 CT HEAD/BRAIN W/O DYE: CPT

## 2022-05-12 PROCEDURE — 25010000002 HYDRALAZINE PER 20 MG: Performed by: HOSPITALIST

## 2022-05-12 PROCEDURE — 99232 SBSQ HOSP IP/OBS MODERATE 35: CPT | Performed by: STUDENT IN AN ORGANIZED HEALTH CARE EDUCATION/TRAINING PROGRAM

## 2022-05-12 PROCEDURE — 95819 EEG AWAKE AND ASLEEP: CPT

## 2022-05-12 RX ORDER — PROCHLORPERAZINE MALEATE 10 MG
5 TABLET ORAL EVERY 8 HOURS PRN
Status: ON HOLD | COMMUNITY
End: 2022-07-02

## 2022-05-12 RX ORDER — CLONIDINE HYDROCHLORIDE 0.1 MG/1
0.1 TABLET ORAL EVERY 12 HOURS SCHEDULED
Status: DISCONTINUED | OUTPATIENT
Start: 2022-05-12 | End: 2022-05-12

## 2022-05-12 RX ORDER — CLOPIDOGREL BISULFATE 75 MG/1
75 TABLET ORAL DAILY
Status: DISCONTINUED | OUTPATIENT
Start: 2022-05-12 | End: 2022-05-19 | Stop reason: HOSPADM

## 2022-05-12 RX ORDER — HYDROCODONE BITARTRATE AND ACETAMINOPHEN 10; 325 MG/1; MG/1
1 TABLET ORAL 3 TIMES DAILY PRN
COMMUNITY

## 2022-05-12 RX ORDER — LISINOPRIL 10 MG/1
10 TABLET ORAL DAILY
Status: ON HOLD | COMMUNITY
End: 2022-05-12

## 2022-05-12 RX ORDER — LEVOTHYROXINE SODIUM 0.05 MG/1
50 TABLET ORAL
Status: DISCONTINUED | OUTPATIENT
Start: 2022-05-12 | End: 2022-05-19 | Stop reason: HOSPADM

## 2022-05-12 RX ORDER — ROPINIROLE 0.25 MG/1
0.5 TABLET, FILM COATED ORAL 2 TIMES DAILY
Status: CANCELLED | OUTPATIENT
Start: 2022-05-12

## 2022-05-12 RX ORDER — TRAZODONE HYDROCHLORIDE 50 MG/1
100 TABLET ORAL NIGHTLY
Status: CANCELLED | OUTPATIENT
Start: 2022-05-12

## 2022-05-12 RX ORDER — ROPINIROLE 0.25 MG/1
0.5 TABLET, FILM COATED ORAL 2 TIMES DAILY
Status: DISCONTINUED | OUTPATIENT
Start: 2022-05-12 | End: 2022-05-19 | Stop reason: HOSPADM

## 2022-05-12 RX ORDER — HYDROXYZINE HYDROCHLORIDE 25 MG/1
25 TABLET, FILM COATED ORAL 3 TIMES DAILY PRN
Status: DISCONTINUED | OUTPATIENT
Start: 2022-05-12 | End: 2022-05-19 | Stop reason: HOSPADM

## 2022-05-12 RX ORDER — HYDRALAZINE HYDROCHLORIDE 20 MG/ML
10 INJECTION INTRAMUSCULAR; INTRAVENOUS EVERY 6 HOURS PRN
Status: DISCONTINUED | OUTPATIENT
Start: 2022-05-12 | End: 2022-05-19 | Stop reason: HOSPADM

## 2022-05-12 RX ORDER — PRAVASTATIN SODIUM 40 MG
20 TABLET ORAL DAILY
Status: DISCONTINUED | OUTPATIENT
Start: 2022-05-12 | End: 2022-05-19 | Stop reason: HOSPADM

## 2022-05-12 RX ORDER — CALCITRIOL 0.25 UG/1
0.25 CAPSULE, LIQUID FILLED ORAL DAILY
Status: DISCONTINUED | OUTPATIENT
Start: 2022-05-12 | End: 2022-05-19 | Stop reason: HOSPADM

## 2022-05-12 RX ORDER — CETIRIZINE HYDROCHLORIDE 10 MG/1
5 TABLET ORAL DAILY
Status: DISCONTINUED | OUTPATIENT
Start: 2022-05-12 | End: 2022-05-19 | Stop reason: HOSPADM

## 2022-05-12 RX ORDER — PROCHLORPERAZINE MALEATE 5 MG/1
5 TABLET ORAL EVERY 8 HOURS PRN
Status: DISCONTINUED | OUTPATIENT
Start: 2022-05-12 | End: 2022-05-17

## 2022-05-12 RX ORDER — DOCUSATE SODIUM 100 MG/1
100 CAPSULE, LIQUID FILLED ORAL 2 TIMES DAILY
Status: DISCONTINUED | OUTPATIENT
Start: 2022-05-12 | End: 2022-05-19 | Stop reason: HOSPADM

## 2022-05-12 RX ORDER — GABAPENTIN 300 MG/1
300 CAPSULE ORAL DAILY
Status: DISCONTINUED | OUTPATIENT
Start: 2022-05-12 | End: 2022-05-19 | Stop reason: HOSPADM

## 2022-05-12 RX ORDER — INSULIN ASPART 100 [IU]/ML
3 INJECTION, SOLUTION INTRAVENOUS; SUBCUTANEOUS
Status: CANCELLED | OUTPATIENT
Start: 2022-05-12

## 2022-05-12 RX ORDER — CLONIDINE HYDROCHLORIDE 0.1 MG/1
0.1 TABLET ORAL 3 TIMES DAILY
Status: DISCONTINUED | OUTPATIENT
Start: 2022-05-12 | End: 2022-05-13

## 2022-05-12 RX ORDER — HYDROCODONE BITARTRATE AND ACETAMINOPHEN 10; 325 MG/1; MG/1
1 TABLET ORAL 3 TIMES DAILY PRN
Status: DISCONTINUED | OUTPATIENT
Start: 2022-05-12 | End: 2022-05-19 | Stop reason: HOSPADM

## 2022-05-12 RX ORDER — LISINOPRIL 10 MG/1
20 TABLET ORAL DAILY
Status: DISCONTINUED | OUTPATIENT
Start: 2022-05-12 | End: 2022-05-15

## 2022-05-12 RX ORDER — POTASSIUM CHLORIDE 20 MEQ/1
20 TABLET, EXTENDED RELEASE ORAL DAILY
COMMUNITY
End: 2022-05-19 | Stop reason: HOSPADM

## 2022-05-12 RX ORDER — FLUTICASONE PROPIONATE 50 MCG
2 SPRAY, SUSPENSION (ML) NASAL DAILY PRN
Status: DISCONTINUED | OUTPATIENT
Start: 2022-05-12 | End: 2022-05-19 | Stop reason: HOSPADM

## 2022-05-12 RX ORDER — ISOSORBIDE MONONITRATE 30 MG/1
30 TABLET, EXTENDED RELEASE ORAL DAILY
Status: DISCONTINUED | OUTPATIENT
Start: 2022-05-12 | End: 2022-05-19 | Stop reason: HOSPADM

## 2022-05-12 RX ORDER — TRAZODONE HYDROCHLORIDE 50 MG/1
50 TABLET ORAL NIGHTLY PRN
Status: DISCONTINUED | OUTPATIENT
Start: 2022-05-12 | End: 2022-05-19 | Stop reason: HOSPADM

## 2022-05-12 RX ORDER — TIZANIDINE 4 MG/1
4 TABLET ORAL EVERY 6 HOURS PRN
Status: CANCELLED | OUTPATIENT
Start: 2022-05-12

## 2022-05-12 RX ORDER — MAGNESIUM OXIDE 400 MG/1
400 TABLET ORAL DAILY
Status: ON HOLD | COMMUNITY
Start: 2022-05-06 | End: 2022-05-12

## 2022-05-12 RX ORDER — GABAPENTIN 300 MG/1
300 CAPSULE ORAL DAILY
Status: CANCELLED | OUTPATIENT
Start: 2022-05-12

## 2022-05-12 RX ORDER — CLONIDINE HYDROCHLORIDE 0.1 MG/1
0.1 TABLET ORAL 3 TIMES DAILY
Status: CANCELLED | OUTPATIENT
Start: 2022-05-12

## 2022-05-12 RX ORDER — PANTOPRAZOLE SODIUM 40 MG/1
40 TABLET, DELAYED RELEASE ORAL DAILY
Status: DISCONTINUED | OUTPATIENT
Start: 2022-05-12 | End: 2022-05-19 | Stop reason: HOSPADM

## 2022-05-12 RX ORDER — PROCHLORPERAZINE MALEATE 5 MG/1
5 TABLET ORAL EVERY 8 HOURS PRN
Status: CANCELLED | OUTPATIENT
Start: 2022-05-12

## 2022-05-12 RX ORDER — DOCUSATE SODIUM 100 MG/1
100 CAPSULE, LIQUID FILLED ORAL 2 TIMES DAILY PRN
Status: ON HOLD | COMMUNITY
End: 2022-07-10 | Stop reason: SDUPTHER

## 2022-05-12 RX ORDER — FLUOXETINE HYDROCHLORIDE 20 MG/1
40 CAPSULE ORAL EVERY MORNING
Status: CANCELLED | OUTPATIENT
Start: 2022-05-12

## 2022-05-12 RX ORDER — POTASSIUM CHLORIDE 20 MEQ/1
20 TABLET, EXTENDED RELEASE ORAL DAILY
Status: CANCELLED | OUTPATIENT
Start: 2022-05-12

## 2022-05-12 RX ORDER — ROPINIROLE 0.25 MG/1
0.25 TABLET, FILM COATED ORAL EVERY 12 HOURS SCHEDULED
Status: DISCONTINUED | OUTPATIENT
Start: 2022-05-12 | End: 2022-05-12

## 2022-05-12 RX ORDER — LORATADINE 10 MG/1
10 TABLET ORAL DAILY
COMMUNITY
End: 2022-05-19 | Stop reason: HOSPADM

## 2022-05-12 RX ORDER — FLUOXETINE HYDROCHLORIDE 20 MG/1
40 CAPSULE ORAL EVERY MORNING
COMMUNITY
End: 2022-05-19 | Stop reason: HOSPADM

## 2022-05-12 RX ADMIN — ROPINIROLE HYDROCHLORIDE 0.5 MG: 0.25 TABLET, FILM COATED ORAL at 21:12

## 2022-05-12 RX ADMIN — HYDRALAZINE HYDROCHLORIDE 10 MG: 20 INJECTION INTRAMUSCULAR; INTRAVENOUS at 05:21

## 2022-05-12 RX ADMIN — DOCUSATE SODIUM 100 MG: 100 CAPSULE ORAL at 14:27

## 2022-05-12 RX ADMIN — PANTOPRAZOLE SODIUM 40 MG: 40 TABLET, DELAYED RELEASE ORAL at 14:27

## 2022-05-12 RX ADMIN — HYDROCODONE BITARTRATE AND ACETAMINOPHEN 1 TABLET: 10; 325 TABLET ORAL at 21:12

## 2022-05-12 RX ADMIN — HYDROXYZINE HYDROCHLORIDE 25 MG: 25 TABLET ORAL at 21:12

## 2022-05-12 RX ADMIN — INSULIN ASPART 3 UNITS: 100 INJECTION, SOLUTION INTRAVENOUS; SUBCUTANEOUS at 08:03

## 2022-05-12 RX ADMIN — HYDRALAZINE HYDROCHLORIDE 50 MG: 50 TABLET, FILM COATED ORAL at 08:02

## 2022-05-12 RX ADMIN — ROPINIROLE HYDROCHLORIDE 0.25 MG: 0.25 TABLET, FILM COATED ORAL at 01:24

## 2022-05-12 RX ADMIN — LISINOPRIL 20 MG: 10 TABLET ORAL at 08:02

## 2022-05-12 RX ADMIN — Medication 10 ML: at 00:10

## 2022-05-12 RX ADMIN — CLONIDINE HYDROCHLORIDE 0.1 MG: 0.1 TABLET ORAL at 08:02

## 2022-05-12 RX ADMIN — ROPINIROLE HYDROCHLORIDE 0.25 MG: 0.25 TABLET, FILM COATED ORAL at 08:02

## 2022-05-12 RX ADMIN — CARVEDILOL 12.5 MG: 6.25 TABLET, FILM COATED ORAL at 08:02

## 2022-05-12 RX ADMIN — ISOSORBIDE MONONITRATE 30 MG: 30 TABLET, EXTENDED RELEASE ORAL at 14:28

## 2022-05-12 RX ADMIN — CARVEDILOL 12.5 MG: 6.25 TABLET, FILM COATED ORAL at 00:26

## 2022-05-12 RX ADMIN — CALCITRIOL 0.25 MCG: 0.25 CAPSULE ORAL at 14:27

## 2022-05-12 RX ADMIN — INSULIN DETEMIR 10 UNITS: 100 INJECTION, SOLUTION SUBCUTANEOUS at 21:20

## 2022-05-12 RX ADMIN — PRAVASTATIN SODIUM 20 MG: 40 TABLET ORAL at 14:27

## 2022-05-12 RX ADMIN — LEVOTHYROXINE SODIUM 50 MCG: 50 TABLET ORAL at 14:27

## 2022-05-12 RX ADMIN — Medication 10 ML: at 21:12

## 2022-05-12 RX ADMIN — GABAPENTIN 300 MG: 300 CAPSULE ORAL at 14:27

## 2022-05-12 RX ADMIN — CETIRIZINE HYDROCHLORIDE 5 MG: 10 TABLET, FILM COATED ORAL at 14:27

## 2022-05-12 RX ADMIN — CLOPIDOGREL 75 MG: 75 TABLET, FILM COATED ORAL at 14:27

## 2022-05-12 RX ADMIN — HYDRALAZINE HYDROCHLORIDE 50 MG: 50 TABLET, FILM COATED ORAL at 00:26

## 2022-05-12 RX ADMIN — Medication 10 ML: at 08:02

## 2022-05-13 PROBLEM — E43 SEVERE MALNUTRITION (HCC): Status: ACTIVE | Noted: 2022-05-13

## 2022-05-13 LAB
ANION GAP SERPL CALCULATED.3IONS-SCNC: 9.3 MMOL/L (ref 5–15)
BUN SERPL-MCNC: 23 MG/DL (ref 8–23)
BUN/CREAT SERPL: 5.7 (ref 7–25)
CALCIUM SPEC-SCNC: 7.4 MG/DL (ref 8.6–10.5)
CHLORIDE SERPL-SCNC: 101 MMOL/L (ref 98–107)
CO2 SERPL-SCNC: 23.7 MMOL/L (ref 22–29)
CREAT SERPL-MCNC: 4.03 MG/DL (ref 0.57–1)
EGFRCR SERPLBLD CKD-EPI 2021: 11.8 ML/MIN/1.73
GLUCOSE BLDC GLUCOMTR-MCNC: 104 MG/DL (ref 70–130)
GLUCOSE BLDC GLUCOMTR-MCNC: 117 MG/DL (ref 70–130)
GLUCOSE BLDC GLUCOMTR-MCNC: 157 MG/DL (ref 70–130)
GLUCOSE BLDC GLUCOMTR-MCNC: 202 MG/DL (ref 70–130)
GLUCOSE BLDC GLUCOMTR-MCNC: 64 MG/DL (ref 70–130)
GLUCOSE SERPL-MCNC: 254 MG/DL (ref 65–99)
POTASSIUM SERPL-SCNC: 4.2 MMOL/L (ref 3.5–5.2)
SODIUM SERPL-SCNC: 134 MMOL/L (ref 136–145)

## 2022-05-13 PROCEDURE — 63710000001 INSULIN ASPART PER 5 UNITS: Performed by: HOSPITALIST

## 2022-05-13 PROCEDURE — 80048 BASIC METABOLIC PNL TOTAL CA: CPT | Performed by: STUDENT IN AN ORGANIZED HEALTH CARE EDUCATION/TRAINING PROGRAM

## 2022-05-13 PROCEDURE — 99232 SBSQ HOSP IP/OBS MODERATE 35: CPT | Performed by: STUDENT IN AN ORGANIZED HEALTH CARE EDUCATION/TRAINING PROGRAM

## 2022-05-13 PROCEDURE — 63710000001 INSULIN DETEMIR PER 5 UNITS: Performed by: STUDENT IN AN ORGANIZED HEALTH CARE EDUCATION/TRAINING PROGRAM

## 2022-05-13 PROCEDURE — 82962 GLUCOSE BLOOD TEST: CPT

## 2022-05-13 RX ORDER — HYDRALAZINE HYDROCHLORIDE 25 MG/1
25 TABLET, FILM COATED ORAL 2 TIMES DAILY
Status: DISCONTINUED | OUTPATIENT
Start: 2022-05-13 | End: 2022-05-16

## 2022-05-13 RX ORDER — CLONIDINE HYDROCHLORIDE 0.1 MG/1
0.1 TABLET ORAL DAILY
Status: DISCONTINUED | OUTPATIENT
Start: 2022-05-14 | End: 2022-05-15

## 2022-05-13 RX ADMIN — PANTOPRAZOLE SODIUM 40 MG: 40 TABLET, DELAYED RELEASE ORAL at 08:42

## 2022-05-13 RX ADMIN — ROPINIROLE HYDROCHLORIDE 0.5 MG: 0.25 TABLET, FILM COATED ORAL at 22:15

## 2022-05-13 RX ADMIN — PRAVASTATIN SODIUM 20 MG: 40 TABLET ORAL at 08:43

## 2022-05-13 RX ADMIN — LISINOPRIL 20 MG: 10 TABLET ORAL at 08:44

## 2022-05-13 RX ADMIN — CETIRIZINE HYDROCHLORIDE 5 MG: 10 TABLET, FILM COATED ORAL at 08:43

## 2022-05-13 RX ADMIN — CARVEDILOL 12.5 MG: 6.25 TABLET, FILM COATED ORAL at 08:44

## 2022-05-13 RX ADMIN — Medication 10 ML: at 22:17

## 2022-05-13 RX ADMIN — INSULIN DETEMIR 10 UNITS: 100 INJECTION, SOLUTION SUBCUTANEOUS at 22:18

## 2022-05-13 RX ADMIN — CLOPIDOGREL 75 MG: 75 TABLET, FILM COATED ORAL at 08:43

## 2022-05-13 RX ADMIN — HYDROCODONE BITARTRATE AND ACETAMINOPHEN 1 TABLET: 10; 325 TABLET ORAL at 22:15

## 2022-05-13 RX ADMIN — HYDROXYZINE HYDROCHLORIDE 25 MG: 25 TABLET ORAL at 22:15

## 2022-05-13 RX ADMIN — HYDRALAZINE HYDROCHLORIDE 25 MG: 50 TABLET, FILM COATED ORAL at 22:16

## 2022-05-13 RX ADMIN — CLONIDINE HYDROCHLORIDE 0.1 MG: 0.1 TABLET ORAL at 08:45

## 2022-05-13 RX ADMIN — Medication 10 ML: at 08:42

## 2022-05-13 RX ADMIN — ROPINIROLE HYDROCHLORIDE 0.5 MG: 0.25 TABLET, FILM COATED ORAL at 08:43

## 2022-05-13 RX ADMIN — ISOSORBIDE MONONITRATE 30 MG: 30 TABLET, EXTENDED RELEASE ORAL at 08:44

## 2022-05-13 RX ADMIN — INSULIN ASPART 3 UNITS: 100 INJECTION, SOLUTION INTRAVENOUS; SUBCUTANEOUS at 12:46

## 2022-05-13 RX ADMIN — SODIUM CHLORIDE 500 ML: 9 INJECTION, SOLUTION INTRAVENOUS at 13:30

## 2022-05-13 RX ADMIN — CALCITRIOL 0.25 MCG: 0.25 CAPSULE ORAL at 08:43

## 2022-05-13 RX ADMIN — LEVOTHYROXINE SODIUM 50 MCG: 50 TABLET ORAL at 08:42

## 2022-05-14 LAB
GLUCOSE BLDC GLUCOMTR-MCNC: 112 MG/DL (ref 70–130)
GLUCOSE BLDC GLUCOMTR-MCNC: 154 MG/DL (ref 70–130)
GLUCOSE BLDC GLUCOMTR-MCNC: 253 MG/DL (ref 70–130)
GLUCOSE BLDC GLUCOMTR-MCNC: 263 MG/DL (ref 70–130)

## 2022-05-14 PROCEDURE — 99232 SBSQ HOSP IP/OBS MODERATE 35: CPT | Performed by: STUDENT IN AN ORGANIZED HEALTH CARE EDUCATION/TRAINING PROGRAM

## 2022-05-14 PROCEDURE — 82962 GLUCOSE BLOOD TEST: CPT

## 2022-05-14 PROCEDURE — 63710000001 INSULIN ASPART PER 5 UNITS: Performed by: HOSPITALIST

## 2022-05-14 PROCEDURE — 63710000001 INSULIN DETEMIR PER 5 UNITS: Performed by: STUDENT IN AN ORGANIZED HEALTH CARE EDUCATION/TRAINING PROGRAM

## 2022-05-14 RX ADMIN — INSULIN ASPART 4 UNITS: 100 INJECTION, SOLUTION INTRAVENOUS; SUBCUTANEOUS at 11:58

## 2022-05-14 RX ADMIN — ROPINIROLE HYDROCHLORIDE 0.5 MG: 0.25 TABLET, FILM COATED ORAL at 22:00

## 2022-05-14 RX ADMIN — DOCUSATE SODIUM 100 MG: 100 CAPSULE ORAL at 22:00

## 2022-05-14 RX ADMIN — HYDROCODONE BITARTRATE AND ACETAMINOPHEN 1 TABLET: 10; 325 TABLET ORAL at 12:00

## 2022-05-14 RX ADMIN — ROPINIROLE HYDROCHLORIDE 0.5 MG: 0.25 TABLET, FILM COATED ORAL at 09:06

## 2022-05-14 RX ADMIN — HYDROCODONE BITARTRATE AND ACETAMINOPHEN 1 TABLET: 10; 325 TABLET ORAL at 22:41

## 2022-05-14 RX ADMIN — PRAVASTATIN SODIUM 20 MG: 40 TABLET ORAL at 09:06

## 2022-05-14 RX ADMIN — LEVOTHYROXINE SODIUM 50 MCG: 50 TABLET ORAL at 06:22

## 2022-05-14 RX ADMIN — CETIRIZINE HYDROCHLORIDE 5 MG: 10 TABLET, FILM COATED ORAL at 09:06

## 2022-05-14 RX ADMIN — Medication 10 ML: at 09:04

## 2022-05-14 RX ADMIN — CALCITRIOL 0.25 MCG: 0.25 CAPSULE ORAL at 09:06

## 2022-05-14 RX ADMIN — Medication 10 ML: at 22:00

## 2022-05-14 RX ADMIN — CLOPIDOGREL 75 MG: 75 TABLET, FILM COATED ORAL at 09:07

## 2022-05-14 RX ADMIN — HYDRALAZINE HYDROCHLORIDE 25 MG: 50 TABLET, FILM COATED ORAL at 22:00

## 2022-05-14 RX ADMIN — INSULIN DETEMIR 10 UNITS: 100 INJECTION, SOLUTION SUBCUTANEOUS at 20:35

## 2022-05-14 RX ADMIN — PANTOPRAZOLE SODIUM 40 MG: 40 TABLET, DELAYED RELEASE ORAL at 09:06

## 2022-05-14 RX ADMIN — ISOSORBIDE MONONITRATE 30 MG: 30 TABLET, EXTENDED RELEASE ORAL at 09:06

## 2022-05-15 LAB
GLUCOSE BLDC GLUCOMTR-MCNC: 157 MG/DL (ref 70–130)
GLUCOSE BLDC GLUCOMTR-MCNC: 175 MG/DL (ref 70–130)
GLUCOSE BLDC GLUCOMTR-MCNC: 289 MG/DL (ref 70–130)
GLUCOSE BLDC GLUCOMTR-MCNC: 353 MG/DL (ref 70–130)
GLUCOSE BLDC GLUCOMTR-MCNC: 78 MG/DL (ref 70–130)

## 2022-05-15 PROCEDURE — 82962 GLUCOSE BLOOD TEST: CPT

## 2022-05-15 PROCEDURE — 25010000002 HYDRALAZINE PER 20 MG: Performed by: STUDENT IN AN ORGANIZED HEALTH CARE EDUCATION/TRAINING PROGRAM

## 2022-05-15 PROCEDURE — 63710000001 INSULIN ASPART PER 5 UNITS: Performed by: STUDENT IN AN ORGANIZED HEALTH CARE EDUCATION/TRAINING PROGRAM

## 2022-05-15 PROCEDURE — 99232 SBSQ HOSP IP/OBS MODERATE 35: CPT | Performed by: STUDENT IN AN ORGANIZED HEALTH CARE EDUCATION/TRAINING PROGRAM

## 2022-05-15 PROCEDURE — 63710000001 INSULIN DETEMIR PER 5 UNITS: Performed by: STUDENT IN AN ORGANIZED HEALTH CARE EDUCATION/TRAINING PROGRAM

## 2022-05-15 RX ORDER — CARVEDILOL 25 MG/1
25 TABLET ORAL 2 TIMES DAILY WITH MEALS
Status: DISCONTINUED | OUTPATIENT
Start: 2022-05-15 | End: 2022-05-16

## 2022-05-15 RX ORDER — LISINOPRIL 10 MG/1
10 TABLET ORAL EVERY EVENING
Status: DISCONTINUED | OUTPATIENT
Start: 2022-05-16 | End: 2022-05-18

## 2022-05-15 RX ORDER — LISINOPRIL 10 MG/1
20 TABLET ORAL EVERY EVENING
Status: DISCONTINUED | OUTPATIENT
Start: 2022-05-15 | End: 2022-05-15

## 2022-05-15 RX ORDER — CLONIDINE HYDROCHLORIDE 0.1 MG/1
0.1 TABLET ORAL DAILY PRN
Status: DISCONTINUED | OUTPATIENT
Start: 2022-05-15 | End: 2022-05-16

## 2022-05-15 RX ADMIN — CARVEDILOL 12.5 MG: 6.25 TABLET, FILM COATED ORAL at 08:28

## 2022-05-15 RX ADMIN — HYDRALAZINE HYDROCHLORIDE 25 MG: 50 TABLET, FILM COATED ORAL at 20:39

## 2022-05-15 RX ADMIN — ROPINIROLE HYDROCHLORIDE 0.5 MG: 0.25 TABLET, FILM COATED ORAL at 20:38

## 2022-05-15 RX ADMIN — CETIRIZINE HYDROCHLORIDE 5 MG: 10 TABLET, FILM COATED ORAL at 08:29

## 2022-05-15 RX ADMIN — ISOSORBIDE MONONITRATE 30 MG: 30 TABLET, EXTENDED RELEASE ORAL at 09:23

## 2022-05-15 RX ADMIN — INSULIN ASPART 4 UNITS: 100 INJECTION, SOLUTION INTRAVENOUS; SUBCUTANEOUS at 11:41

## 2022-05-15 RX ADMIN — Medication 10 ML: at 20:38

## 2022-05-15 RX ADMIN — SODIUM CHLORIDE 500 ML: 9 INJECTION, SOLUTION INTRAVENOUS at 15:07

## 2022-05-15 RX ADMIN — CALCITRIOL 0.25 MCG: 0.25 CAPSULE ORAL at 08:29

## 2022-05-15 RX ADMIN — HYDRALAZINE HYDROCHLORIDE 10 MG: 20 INJECTION INTRAMUSCULAR; INTRAVENOUS at 07:20

## 2022-05-15 RX ADMIN — CLONIDINE HYDROCHLORIDE 0.1 MG: 0.1 TABLET ORAL at 08:29

## 2022-05-15 RX ADMIN — HYDRALAZINE HYDROCHLORIDE 10 MG: 20 INJECTION INTRAMUSCULAR; INTRAVENOUS at 00:29

## 2022-05-15 RX ADMIN — GABAPENTIN 300 MG: 300 CAPSULE ORAL at 08:29

## 2022-05-15 RX ADMIN — HYDROCODONE BITARTRATE AND ACETAMINOPHEN 1 TABLET: 10; 325 TABLET ORAL at 22:39

## 2022-05-15 RX ADMIN — HYDROXYZINE HYDROCHLORIDE 25 MG: 25 TABLET ORAL at 22:39

## 2022-05-15 RX ADMIN — LEVOTHYROXINE SODIUM 50 MCG: 50 TABLET ORAL at 05:10

## 2022-05-15 RX ADMIN — PANTOPRAZOLE SODIUM 40 MG: 40 TABLET, DELAYED RELEASE ORAL at 08:29

## 2022-05-15 RX ADMIN — INSULIN DETEMIR 10 UNITS: 100 INJECTION, SOLUTION SUBCUTANEOUS at 21:07

## 2022-05-15 RX ADMIN — HYDROCODONE BITARTRATE AND ACETAMINOPHEN 1 TABLET: 10; 325 TABLET ORAL at 11:46

## 2022-05-15 RX ADMIN — PRAVASTATIN SODIUM 20 MG: 40 TABLET ORAL at 08:29

## 2022-05-15 RX ADMIN — INSULIN ASPART 2 UNITS: 100 INJECTION, SOLUTION INTRAVENOUS; SUBCUTANEOUS at 17:39

## 2022-05-15 RX ADMIN — Medication 10 ML: at 08:30

## 2022-05-15 RX ADMIN — TRAZODONE HYDROCHLORIDE 50 MG: 50 TABLET ORAL at 22:39

## 2022-05-15 RX ADMIN — ROPINIROLE HYDROCHLORIDE 0.5 MG: 0.25 TABLET, FILM COATED ORAL at 08:29

## 2022-05-15 RX ADMIN — CLOPIDOGREL 75 MG: 75 TABLET, FILM COATED ORAL at 08:29

## 2022-05-16 ENCOUNTER — APPOINTMENT (OUTPATIENT)
Dept: CARDIOLOGY | Facility: HOSPITAL | Age: 62
End: 2022-05-16

## 2022-05-16 LAB
ALBUMIN SERPL-MCNC: 2.28 G/DL (ref 3.5–5.2)
ALBUMIN/GLOB SERPL: 0.9 G/DL
ALP SERPL-CCNC: 119 U/L (ref 39–117)
ALT SERPL W P-5'-P-CCNC: 14 U/L (ref 1–33)
ANION GAP SERPL CALCULATED.3IONS-SCNC: 8.5 MMOL/L (ref 5–15)
AST SERPL-CCNC: 17 U/L (ref 1–32)
BACTERIA SPEC AEROBE CULT: NORMAL
BACTERIA SPEC AEROBE CULT: NORMAL
BASOPHILS # BLD AUTO: 0.03 10*3/MM3 (ref 0–0.2)
BASOPHILS NFR BLD AUTO: 0.7 % (ref 0–1.5)
BH CV ECHO MEAS - AO ROOT DIAM: 2.5 CM
BH CV ECHO MEAS - EDV(CUBED): 42.9 ML
BH CV ECHO MEAS - EDV(MOD-SP4): 43.6 ML
BH CV ECHO MEAS - EF(MOD-SP4): 65.4 %
BH CV ECHO MEAS - ESV(CUBED): 35.9 ML
BH CV ECHO MEAS - ESV(MOD-SP4): 15.1 ML
BH CV ECHO MEAS - FS: 5.7 %
BH CV ECHO MEAS - IVS/LVPW: 1 CM
BH CV ECHO MEAS - IVSD: 1.7 CM
BH CV ECHO MEAS - LA DIMENSION: 2.9 CM
BH CV ECHO MEAS - LAT PEAK E' VEL: 7.3 CM/SEC
BH CV ECHO MEAS - LV DIASTOLIC VOL/BSA (35-75): 29.9 CM2
BH CV ECHO MEAS - LV MASS(C)D: 238.2 GRAMS
BH CV ECHO MEAS - LV SYSTOLIC VOL/BSA (12-30): 10.4 CM2
BH CV ECHO MEAS - LVIDD: 3.5 CM
BH CV ECHO MEAS - LVIDS: 3.3 CM
BH CV ECHO MEAS - LVOT AREA: 2.8 CM2
BH CV ECHO MEAS - LVOT DIAM: 1.9 CM
BH CV ECHO MEAS - LVPWD: 1.7 CM
BH CV ECHO MEAS - MED PEAK E' VEL: 5 CM/SEC
BH CV ECHO MEAS - MV A MAX VEL: 70.3 CM/SEC
BH CV ECHO MEAS - MV E MAX VEL: 44.1 CM/SEC
BH CV ECHO MEAS - MV E/A: 0.63
BH CV ECHO MEAS - PA ACC TIME: 0.11 SEC
BH CV ECHO MEAS - PA PR(ACCEL): 28.2 MMHG
BH CV ECHO MEAS - RAP SYSTOLE: 10 MMHG
BH CV ECHO MEAS - RVSP: 32.3 MMHG
BH CV ECHO MEAS - SI(MOD-SP4): 19.5 ML/M2
BH CV ECHO MEAS - SV(MOD-SP4): 28.5 ML
BH CV ECHO MEAS - TAPSE (>1.6): 2.02 CM
BH CV ECHO MEAS - TR MAX PG: 22.3 MMHG
BH CV ECHO MEAS - TR MAX VEL: 236 CM/SEC
BH CV ECHO MEASUREMENTS AVERAGE E/E' RATIO: 7.17
BILIRUB SERPL-MCNC: <0.2 MG/DL (ref 0–1.2)
BUN SERPL-MCNC: 29 MG/DL (ref 8–23)
BUN/CREAT SERPL: 8.8 (ref 7–25)
CALCIUM SPEC-SCNC: 7.7 MG/DL (ref 8.6–10.5)
CHLORIDE SERPL-SCNC: 98 MMOL/L (ref 98–107)
CO2 SERPL-SCNC: 26.5 MMOL/L (ref 22–29)
CREAT SERPL-MCNC: 3.3 MG/DL (ref 0.57–1)
DEPRECATED RDW RBC AUTO: 48 FL (ref 37–54)
EGFRCR SERPLBLD CKD-EPI 2021: 15 ML/MIN/1.73
EOSINOPHIL # BLD AUTO: 0.15 10*3/MM3 (ref 0–0.4)
EOSINOPHIL NFR BLD AUTO: 3.7 % (ref 0.3–6.2)
ERYTHROCYTE [DISTWIDTH] IN BLOOD BY AUTOMATED COUNT: 14 % (ref 12.3–15.4)
GLOBULIN UR ELPH-MCNC: 2.4 GM/DL
GLUCOSE BLDC GLUCOMTR-MCNC: 150 MG/DL (ref 70–130)
GLUCOSE BLDC GLUCOMTR-MCNC: 180 MG/DL (ref 70–130)
GLUCOSE BLDC GLUCOMTR-MCNC: 249 MG/DL (ref 70–130)
GLUCOSE BLDC GLUCOMTR-MCNC: 351 MG/DL (ref 70–130)
GLUCOSE SERPL-MCNC: 323 MG/DL (ref 65–99)
HCT VFR BLD AUTO: 25.6 % (ref 34–46.6)
HGB BLD-MCNC: 7.8 G/DL (ref 12–15.9)
IMM GRANULOCYTES # BLD AUTO: 0 10*3/MM3 (ref 0–0.05)
IMM GRANULOCYTES NFR BLD AUTO: 0 % (ref 0–0.5)
LEFT ATRIUM VOLUME INDEX: 31.6 ML/M2
LYMPHOCYTES # BLD AUTO: 1.48 10*3/MM3 (ref 0.7–3.1)
LYMPHOCYTES NFR BLD AUTO: 36.8 % (ref 19.6–45.3)
MAGNESIUM SERPL-MCNC: 1.7 MG/DL (ref 1.6–2.4)
MAXIMAL PREDICTED HEART RATE: 156 BPM
MCH RBC QN AUTO: 29.1 PG (ref 26.6–33)
MCHC RBC AUTO-ENTMCNC: 30.5 G/DL (ref 31.5–35.7)
MCV RBC AUTO: 95.5 FL (ref 79–97)
MONOCYTES # BLD AUTO: 0.31 10*3/MM3 (ref 0.1–0.9)
MONOCYTES NFR BLD AUTO: 7.7 % (ref 5–12)
NEUTROPHILS NFR BLD AUTO: 2.05 10*3/MM3 (ref 1.7–7)
NEUTROPHILS NFR BLD AUTO: 51.1 % (ref 42.7–76)
NRBC BLD AUTO-RTO: 0 /100 WBC (ref 0–0.2)
PLATELET # BLD AUTO: 158 10*3/MM3 (ref 140–450)
PMV BLD AUTO: 10.4 FL (ref 6–12)
POTASSIUM SERPL-SCNC: 4 MMOL/L (ref 3.5–5.2)
PROT SERPL-MCNC: 4.7 G/DL (ref 6–8.5)
RBC # BLD AUTO: 2.68 10*6/MM3 (ref 3.77–5.28)
SODIUM SERPL-SCNC: 133 MMOL/L (ref 136–145)
STRESS TARGET HR: 133 BPM
WBC NRBC COR # BLD: 4.02 10*3/MM3 (ref 3.4–10.8)

## 2022-05-16 PROCEDURE — 99233 SBSQ HOSP IP/OBS HIGH 50: CPT | Performed by: STUDENT IN AN ORGANIZED HEALTH CARE EDUCATION/TRAINING PROGRAM

## 2022-05-16 PROCEDURE — 83735 ASSAY OF MAGNESIUM: CPT | Performed by: INTERNAL MEDICINE

## 2022-05-16 PROCEDURE — 82962 GLUCOSE BLOOD TEST: CPT

## 2022-05-16 PROCEDURE — 94799 UNLISTED PULMONARY SVC/PX: CPT

## 2022-05-16 PROCEDURE — 63710000001 INSULIN DETEMIR PER 5 UNITS: Performed by: STUDENT IN AN ORGANIZED HEALTH CARE EDUCATION/TRAINING PROGRAM

## 2022-05-16 PROCEDURE — 63710000001 PROCHLORPERAZINE MALEATE PER 5 MG: Performed by: STUDENT IN AN ORGANIZED HEALTH CARE EDUCATION/TRAINING PROGRAM

## 2022-05-16 PROCEDURE — 97166 OT EVAL MOD COMPLEX 45 MIN: CPT

## 2022-05-16 PROCEDURE — 93306 TTE W/DOPPLER COMPLETE: CPT | Performed by: INTERNAL MEDICINE

## 2022-05-16 PROCEDURE — 80053 COMPREHEN METABOLIC PANEL: CPT | Performed by: INTERNAL MEDICINE

## 2022-05-16 PROCEDURE — 97162 PT EVAL MOD COMPLEX 30 MIN: CPT

## 2022-05-16 PROCEDURE — 93306 TTE W/DOPPLER COMPLETE: CPT

## 2022-05-16 PROCEDURE — 97530 THERAPEUTIC ACTIVITIES: CPT

## 2022-05-16 PROCEDURE — 85025 COMPLETE CBC W/AUTO DIFF WBC: CPT | Performed by: INTERNAL MEDICINE

## 2022-05-16 PROCEDURE — 63710000001 INSULIN ASPART PER 5 UNITS: Performed by: STUDENT IN AN ORGANIZED HEALTH CARE EDUCATION/TRAINING PROGRAM

## 2022-05-16 RX ORDER — HYDRALAZINE HYDROCHLORIDE 10 MG/1
10 TABLET, FILM COATED ORAL EVERY 8 HOURS SCHEDULED
Status: DISCONTINUED | OUTPATIENT
Start: 2022-05-16 | End: 2022-05-17

## 2022-05-16 RX ORDER — CARVEDILOL 6.25 MG/1
12.5 TABLET ORAL 2 TIMES DAILY WITH MEALS
Status: DISCONTINUED | OUTPATIENT
Start: 2022-05-17 | End: 2022-05-19 | Stop reason: HOSPADM

## 2022-05-16 RX ADMIN — PANTOPRAZOLE SODIUM 40 MG: 40 TABLET, DELAYED RELEASE ORAL at 08:01

## 2022-05-16 RX ADMIN — PROCHLORPERAZINE MALEATE 5 MG: 5 TABLET ORAL at 05:49

## 2022-05-16 RX ADMIN — CARVEDILOL 25 MG: 25 TABLET, FILM COATED ORAL at 08:01

## 2022-05-16 RX ADMIN — HYDROXYZINE HYDROCHLORIDE 25 MG: 25 TABLET ORAL at 05:49

## 2022-05-16 RX ADMIN — INSULIN ASPART 2 UNITS: 100 INJECTION, SOLUTION INTRAVENOUS; SUBCUTANEOUS at 08:02

## 2022-05-16 RX ADMIN — INSULIN DETEMIR 10 UNITS: 100 INJECTION, SOLUTION SUBCUTANEOUS at 21:15

## 2022-05-16 RX ADMIN — HYDROCODONE BITARTRATE AND ACETAMINOPHEN 1 TABLET: 10; 325 TABLET ORAL at 15:55

## 2022-05-16 RX ADMIN — INSULIN ASPART 6 UNITS: 100 INJECTION, SOLUTION INTRAVENOUS; SUBCUTANEOUS at 17:25

## 2022-05-16 RX ADMIN — LEVOTHYROXINE SODIUM 50 MCG: 50 TABLET ORAL at 05:20

## 2022-05-16 RX ADMIN — HYDROXYZINE HYDROCHLORIDE 25 MG: 25 TABLET ORAL at 21:11

## 2022-05-16 RX ADMIN — ISOSORBIDE MONONITRATE 30 MG: 30 TABLET, EXTENDED RELEASE ORAL at 08:01

## 2022-05-16 RX ADMIN — HYDRALAZINE HYDROCHLORIDE 25 MG: 50 TABLET, FILM COATED ORAL at 08:02

## 2022-05-16 RX ADMIN — CETIRIZINE HYDROCHLORIDE 5 MG: 10 TABLET, FILM COATED ORAL at 08:01

## 2022-05-16 RX ADMIN — CALCITRIOL 0.25 MCG: 0.25 CAPSULE ORAL at 08:01

## 2022-05-16 RX ADMIN — TRAZODONE HYDROCHLORIDE 50 MG: 50 TABLET ORAL at 22:43

## 2022-05-16 RX ADMIN — Medication 10 ML: at 08:02

## 2022-05-16 RX ADMIN — ROPINIROLE HYDROCHLORIDE 0.5 MG: 0.25 TABLET, FILM COATED ORAL at 08:02

## 2022-05-16 RX ADMIN — CLONIDINE HYDROCHLORIDE 0.1 MG: 0.1 TABLET ORAL at 06:30

## 2022-05-16 RX ADMIN — DOCUSATE SODIUM 100 MG: 100 CAPSULE ORAL at 08:02

## 2022-05-16 RX ADMIN — ROPINIROLE HYDROCHLORIDE 0.5 MG: 0.25 TABLET, FILM COATED ORAL at 21:11

## 2022-05-16 RX ADMIN — Medication 10 ML: at 21:12

## 2022-05-16 RX ADMIN — CLOPIDOGREL 75 MG: 75 TABLET, FILM COATED ORAL at 08:01

## 2022-05-16 RX ADMIN — PRAVASTATIN SODIUM 20 MG: 40 TABLET ORAL at 08:01

## 2022-05-16 RX ADMIN — GABAPENTIN 300 MG: 300 CAPSULE ORAL at 08:02

## 2022-05-17 LAB
ANION GAP SERPL CALCULATED.3IONS-SCNC: 7.1 MMOL/L (ref 5–15)
BUN SERPL-MCNC: 31 MG/DL (ref 8–23)
BUN/CREAT SERPL: 10 (ref 7–25)
CALCIUM SPEC-SCNC: 7.8 MG/DL (ref 8.6–10.5)
CHLORIDE SERPL-SCNC: 96 MMOL/L (ref 98–107)
CO2 SERPL-SCNC: 26.9 MMOL/L (ref 22–29)
CREAT SERPL-MCNC: 3.11 MG/DL (ref 0.57–1)
DEPRECATED RDW RBC AUTO: 46.4 FL (ref 37–54)
EGFRCR SERPLBLD CKD-EPI 2021: 16.2 ML/MIN/1.73
ERYTHROCYTE [DISTWIDTH] IN BLOOD BY AUTOMATED COUNT: 13.8 % (ref 12.3–15.4)
GLUCOSE BLDC GLUCOMTR-MCNC: 194 MG/DL (ref 70–130)
GLUCOSE BLDC GLUCOMTR-MCNC: 202 MG/DL (ref 70–130)
GLUCOSE BLDC GLUCOMTR-MCNC: 239 MG/DL (ref 70–130)
GLUCOSE BLDC GLUCOMTR-MCNC: 277 MG/DL (ref 70–130)
GLUCOSE SERPL-MCNC: 328 MG/DL (ref 65–99)
HCT VFR BLD AUTO: 26.1 % (ref 34–46.6)
HGB BLD-MCNC: 8.1 G/DL (ref 12–15.9)
MCH RBC QN AUTO: 29.2 PG (ref 26.6–33)
MCHC RBC AUTO-ENTMCNC: 31 G/DL (ref 31.5–35.7)
MCV RBC AUTO: 94.2 FL (ref 79–97)
PLATELET # BLD AUTO: 135 10*3/MM3 (ref 140–450)
PMV BLD AUTO: 10 FL (ref 6–12)
POTASSIUM SERPL-SCNC: 3.6 MMOL/L (ref 3.5–5.2)
RBC # BLD AUTO: 2.77 10*6/MM3 (ref 3.77–5.28)
SODIUM SERPL-SCNC: 130 MMOL/L (ref 136–145)
WBC NRBC COR # BLD: 4.07 10*3/MM3 (ref 3.4–10.8)

## 2022-05-17 PROCEDURE — 94799 UNLISTED PULMONARY SVC/PX: CPT

## 2022-05-17 PROCEDURE — 63710000001 INSULIN ASPART PER 5 UNITS: Performed by: STUDENT IN AN ORGANIZED HEALTH CARE EDUCATION/TRAINING PROGRAM

## 2022-05-17 PROCEDURE — 82962 GLUCOSE BLOOD TEST: CPT

## 2022-05-17 PROCEDURE — 81050 URINALYSIS VOLUME MEASURE: CPT | Performed by: STUDENT IN AN ORGANIZED HEALTH CARE EDUCATION/TRAINING PROGRAM

## 2022-05-17 PROCEDURE — 83835 ASSAY OF METANEPHRINES: CPT | Performed by: STUDENT IN AN ORGANIZED HEALTH CARE EDUCATION/TRAINING PROGRAM

## 2022-05-17 PROCEDURE — 99232 SBSQ HOSP IP/OBS MODERATE 35: CPT | Performed by: STUDENT IN AN ORGANIZED HEALTH CARE EDUCATION/TRAINING PROGRAM

## 2022-05-17 PROCEDURE — 80048 BASIC METABOLIC PNL TOTAL CA: CPT | Performed by: STUDENT IN AN ORGANIZED HEALTH CARE EDUCATION/TRAINING PROGRAM

## 2022-05-17 PROCEDURE — 63710000001 PROCHLORPERAZINE MALEATE PER 5 MG: Performed by: STUDENT IN AN ORGANIZED HEALTH CARE EDUCATION/TRAINING PROGRAM

## 2022-05-17 PROCEDURE — 63710000001 INSULIN DETEMIR PER 5 UNITS: Performed by: STUDENT IN AN ORGANIZED HEALTH CARE EDUCATION/TRAINING PROGRAM

## 2022-05-17 PROCEDURE — 85027 COMPLETE CBC AUTOMATED: CPT | Performed by: STUDENT IN AN ORGANIZED HEALTH CARE EDUCATION/TRAINING PROGRAM

## 2022-05-17 RX ORDER — PROCHLORPERAZINE MALEATE 5 MG/1
5 TABLET ORAL EVERY 6 HOURS PRN
Status: DISCONTINUED | OUTPATIENT
Start: 2022-05-17 | End: 2022-05-19 | Stop reason: HOSPADM

## 2022-05-17 RX ADMIN — HYDRALAZINE HYDROCHLORIDE 10 MG: 10 TABLET, FILM COATED ORAL at 05:32

## 2022-05-17 RX ADMIN — LISINOPRIL 10 MG: 10 TABLET ORAL at 18:28

## 2022-05-17 RX ADMIN — INSULIN DETEMIR 10 UNITS: 100 INJECTION, SOLUTION SUBCUTANEOUS at 20:48

## 2022-05-17 RX ADMIN — INSULIN ASPART 3 UNITS: 100 INJECTION, SOLUTION INTRAVENOUS; SUBCUTANEOUS at 17:34

## 2022-05-17 RX ADMIN — ROPINIROLE HYDROCHLORIDE 0.5 MG: 0.25 TABLET, FILM COATED ORAL at 20:47

## 2022-05-17 RX ADMIN — CARVEDILOL 12.5 MG: 6.25 TABLET, FILM COATED ORAL at 08:27

## 2022-05-17 RX ADMIN — Medication 10 ML: at 09:00

## 2022-05-17 RX ADMIN — PROCHLORPERAZINE MALEATE 5 MG: 5 TABLET ORAL at 08:27

## 2022-05-17 RX ADMIN — HYDROCODONE BITARTRATE AND ACETAMINOPHEN 1 TABLET: 10; 325 TABLET ORAL at 08:27

## 2022-05-17 RX ADMIN — ISOSORBIDE MONONITRATE 30 MG: 30 TABLET, EXTENDED RELEASE ORAL at 08:28

## 2022-05-17 RX ADMIN — CALCITRIOL 0.25 MCG: 0.25 CAPSULE ORAL at 08:27

## 2022-05-17 RX ADMIN — CLOPIDOGREL 75 MG: 75 TABLET, FILM COATED ORAL at 08:28

## 2022-05-17 RX ADMIN — INSULIN ASPART 2 UNITS: 100 INJECTION, SOLUTION INTRAVENOUS; SUBCUTANEOUS at 08:32

## 2022-05-17 RX ADMIN — PRAVASTATIN SODIUM 20 MG: 40 TABLET ORAL at 08:27

## 2022-05-17 RX ADMIN — TRAZODONE HYDROCHLORIDE 50 MG: 50 TABLET ORAL at 20:47

## 2022-05-17 RX ADMIN — ROPINIROLE HYDROCHLORIDE 0.5 MG: 0.25 TABLET, FILM COATED ORAL at 08:28

## 2022-05-17 RX ADMIN — INSULIN ASPART 3 UNITS: 100 INJECTION, SOLUTION INTRAVENOUS; SUBCUTANEOUS at 12:30

## 2022-05-17 RX ADMIN — PANTOPRAZOLE SODIUM 40 MG: 40 TABLET, DELAYED RELEASE ORAL at 08:28

## 2022-05-17 RX ADMIN — HYDROXYZINE HYDROCHLORIDE 25 MG: 25 TABLET ORAL at 20:47

## 2022-05-17 RX ADMIN — Medication 10 ML: at 20:47

## 2022-05-17 RX ADMIN — LEVOTHYROXINE SODIUM 50 MCG: 50 TABLET ORAL at 05:33

## 2022-05-17 RX ADMIN — CETIRIZINE HYDROCHLORIDE 5 MG: 10 TABLET, FILM COATED ORAL at 08:27

## 2022-05-17 RX ADMIN — GABAPENTIN 300 MG: 300 CAPSULE ORAL at 08:32

## 2022-05-17 RX ADMIN — HYDROCODONE BITARTRATE AND ACETAMINOPHEN 1 TABLET: 10; 325 TABLET ORAL at 22:40

## 2022-05-17 RX ADMIN — PROCHLORPERAZINE MALEATE 5 MG: 5 TABLET ORAL at 13:59

## 2022-05-18 LAB
GLUCOSE BLDC GLUCOMTR-MCNC: 218 MG/DL (ref 70–130)
GLUCOSE BLDC GLUCOMTR-MCNC: 233 MG/DL (ref 70–130)
GLUCOSE BLDC GLUCOMTR-MCNC: 265 MG/DL (ref 70–130)
GLUCOSE BLDC GLUCOMTR-MCNC: 353 MG/DL (ref 70–130)
GLUCOSE BLDC GLUCOMTR-MCNC: 393 MG/DL (ref 70–130)

## 2022-05-18 PROCEDURE — 63710000001 PROCHLORPERAZINE MALEATE PER 5 MG: Performed by: STUDENT IN AN ORGANIZED HEALTH CARE EDUCATION/TRAINING PROGRAM

## 2022-05-18 PROCEDURE — 99232 SBSQ HOSP IP/OBS MODERATE 35: CPT | Performed by: STUDENT IN AN ORGANIZED HEALTH CARE EDUCATION/TRAINING PROGRAM

## 2022-05-18 PROCEDURE — 63710000001 INSULIN DETEMIR PER 5 UNITS: Performed by: STUDENT IN AN ORGANIZED HEALTH CARE EDUCATION/TRAINING PROGRAM

## 2022-05-18 PROCEDURE — 97110 THERAPEUTIC EXERCISES: CPT

## 2022-05-18 PROCEDURE — 82962 GLUCOSE BLOOD TEST: CPT

## 2022-05-18 PROCEDURE — 63710000001 INSULIN ASPART PER 5 UNITS: Performed by: STUDENT IN AN ORGANIZED HEALTH CARE EDUCATION/TRAINING PROGRAM

## 2022-05-18 PROCEDURE — 97530 THERAPEUTIC ACTIVITIES: CPT

## 2022-05-18 RX ORDER — LISINOPRIL 10 MG/1
20 TABLET ORAL EVERY EVENING
Status: DISCONTINUED | OUTPATIENT
Start: 2022-05-19 | End: 2022-05-19 | Stop reason: HOSPADM

## 2022-05-18 RX ORDER — INSULIN ASPART 100 [IU]/ML
6 INJECTION, SOLUTION INTRAVENOUS; SUBCUTANEOUS ONCE
Status: COMPLETED | OUTPATIENT
Start: 2022-05-19 | End: 2022-05-18

## 2022-05-18 RX ORDER — LISINOPRIL 10 MG/1
10 TABLET ORAL ONCE
Status: DISCONTINUED | OUTPATIENT
Start: 2022-05-18 | End: 2022-05-19 | Stop reason: HOSPADM

## 2022-05-18 RX ORDER — HYDRALAZINE HYDROCHLORIDE 10 MG/1
10 TABLET, FILM COATED ORAL EVERY 12 HOURS SCHEDULED
Status: DISCONTINUED | OUTPATIENT
Start: 2022-05-18 | End: 2022-05-18

## 2022-05-18 RX ADMIN — HYDROCODONE BITARTRATE AND ACETAMINOPHEN 1 TABLET: 10; 325 TABLET ORAL at 17:46

## 2022-05-18 RX ADMIN — TRAZODONE HYDROCHLORIDE 50 MG: 50 TABLET ORAL at 23:39

## 2022-05-18 RX ADMIN — Medication 10 ML: at 20:37

## 2022-05-18 RX ADMIN — PROCHLORPERAZINE MALEATE 5 MG: 5 TABLET ORAL at 05:14

## 2022-05-18 RX ADMIN — CALCITRIOL 0.25 MCG: 0.25 CAPSULE ORAL at 08:19

## 2022-05-18 RX ADMIN — GABAPENTIN 300 MG: 300 CAPSULE ORAL at 08:18

## 2022-05-18 RX ADMIN — PRAVASTATIN SODIUM 20 MG: 40 TABLET ORAL at 08:19

## 2022-05-18 RX ADMIN — DOCUSATE SODIUM 100 MG: 100 CAPSULE ORAL at 08:19

## 2022-05-18 RX ADMIN — LEVOTHYROXINE SODIUM 50 MCG: 50 TABLET ORAL at 05:13

## 2022-05-18 RX ADMIN — INSULIN ASPART 3 UNITS: 100 INJECTION, SOLUTION INTRAVENOUS; SUBCUTANEOUS at 08:18

## 2022-05-18 RX ADMIN — HYDRALAZINE HYDROCHLORIDE 10 MG: 10 TABLET, FILM COATED ORAL at 11:35

## 2022-05-18 RX ADMIN — INSULIN ASPART 6 UNITS: 100 INJECTION, SOLUTION INTRAVENOUS; SUBCUTANEOUS at 23:39

## 2022-05-18 RX ADMIN — INSULIN ASPART 3 UNITS: 100 INJECTION, SOLUTION INTRAVENOUS; SUBCUTANEOUS at 17:39

## 2022-05-18 RX ADMIN — HYDROXYZINE HYDROCHLORIDE 25 MG: 25 TABLET ORAL at 23:39

## 2022-05-18 RX ADMIN — ISOSORBIDE MONONITRATE 30 MG: 30 TABLET, EXTENDED RELEASE ORAL at 08:19

## 2022-05-18 RX ADMIN — Medication 10 ML: at 08:21

## 2022-05-18 RX ADMIN — ROPINIROLE HYDROCHLORIDE 0.5 MG: 0.25 TABLET, FILM COATED ORAL at 20:36

## 2022-05-18 RX ADMIN — CARVEDILOL 12.5 MG: 6.25 TABLET, FILM COATED ORAL at 08:20

## 2022-05-18 RX ADMIN — INSULIN DETEMIR 15 UNITS: 100 INJECTION, SOLUTION SUBCUTANEOUS at 20:36

## 2022-05-18 RX ADMIN — CLOPIDOGREL 75 MG: 75 TABLET, FILM COATED ORAL at 08:19

## 2022-05-18 RX ADMIN — INSULIN ASPART 3 UNITS: 100 INJECTION, SOLUTION INTRAVENOUS; SUBCUTANEOUS at 12:02

## 2022-05-18 RX ADMIN — HYDROXYZINE HYDROCHLORIDE 25 MG: 25 TABLET ORAL at 05:14

## 2022-05-18 RX ADMIN — CETIRIZINE HYDROCHLORIDE 5 MG: 10 TABLET, FILM COATED ORAL at 08:20

## 2022-05-18 RX ADMIN — PANTOPRAZOLE SODIUM 40 MG: 40 TABLET, DELAYED RELEASE ORAL at 08:19

## 2022-05-18 RX ADMIN — ROPINIROLE HYDROCHLORIDE 0.5 MG: 0.25 TABLET, FILM COATED ORAL at 08:20

## 2022-05-19 ENCOUNTER — READMISSION MANAGEMENT (OUTPATIENT)
Dept: CALL CENTER | Facility: HOSPITAL | Age: 62
End: 2022-05-19

## 2022-05-19 VITALS
TEMPERATURE: 98.1 F | DIASTOLIC BLOOD PRESSURE: 62 MMHG | OXYGEN SATURATION: 96 % | HEART RATE: 91 BPM | BODY MASS INDEX: 22.75 KG/M2 | HEIGHT: 60 IN | WEIGHT: 115.9 LBS | SYSTOLIC BLOOD PRESSURE: 123 MMHG | RESPIRATION RATE: 18 BRPM

## 2022-05-19 LAB
ANION GAP SERPL CALCULATED.3IONS-SCNC: 7.8 MMOL/L (ref 5–15)
BUN SERPL-MCNC: 42 MG/DL (ref 8–23)
BUN/CREAT SERPL: 13 (ref 7–25)
CALCIUM SPEC-SCNC: 7.8 MG/DL (ref 8.6–10.5)
CHLORIDE SERPL-SCNC: 94 MMOL/L (ref 98–107)
CO2 SERPL-SCNC: 28.2 MMOL/L (ref 22–29)
CREAT SERPL-MCNC: 3.22 MG/DL (ref 0.57–1)
EGFRCR SERPLBLD CKD-EPI 2021: 15.5 ML/MIN/1.73
GLUCOSE BLDC GLUCOMTR-MCNC: 122 MG/DL (ref 70–130)
GLUCOSE BLDC GLUCOMTR-MCNC: 177 MG/DL (ref 70–130)
GLUCOSE BLDC GLUCOMTR-MCNC: 324 MG/DL (ref 70–130)
GLUCOSE BLDC GLUCOMTR-MCNC: 54 MG/DL (ref 70–130)
GLUCOSE BLDC GLUCOMTR-MCNC: 54 MG/DL (ref 70–130)
GLUCOSE BLDC GLUCOMTR-MCNC: 59 MG/DL (ref 70–130)
GLUCOSE BLDC GLUCOMTR-MCNC: 60 MG/DL (ref 70–130)
GLUCOSE SERPL-MCNC: 298 MG/DL (ref 65–99)
POTASSIUM SERPL-SCNC: 3.5 MMOL/L (ref 3.5–5.2)
SODIUM SERPL-SCNC: 130 MMOL/L (ref 136–145)

## 2022-05-19 PROCEDURE — 99239 HOSP IP/OBS DSCHRG MGMT >30: CPT | Performed by: STUDENT IN AN ORGANIZED HEALTH CARE EDUCATION/TRAINING PROGRAM

## 2022-05-19 PROCEDURE — 25010000002 HYDRALAZINE PER 20 MG: Performed by: STUDENT IN AN ORGANIZED HEALTH CARE EDUCATION/TRAINING PROGRAM

## 2022-05-19 PROCEDURE — 82962 GLUCOSE BLOOD TEST: CPT

## 2022-05-19 PROCEDURE — 63710000001 INSULIN ASPART PER 5 UNITS: Performed by: PHYSICIAN ASSISTANT

## 2022-05-19 PROCEDURE — 80048 BASIC METABOLIC PNL TOTAL CA: CPT | Performed by: STUDENT IN AN ORGANIZED HEALTH CARE EDUCATION/TRAINING PROGRAM

## 2022-05-19 PROCEDURE — 63710000001 INSULIN ASPART PER 5 UNITS: Performed by: STUDENT IN AN ORGANIZED HEALTH CARE EDUCATION/TRAINING PROGRAM

## 2022-05-19 RX ORDER — ISOSORBIDE MONONITRATE 30 MG/1
30 TABLET, EXTENDED RELEASE ORAL DAILY
Qty: 30 TABLET | Refills: 0 | Status: ON HOLD | OUTPATIENT
Start: 2022-05-19 | End: 2022-07-02

## 2022-05-19 RX ORDER — CARVEDILOL 12.5 MG/1
12.5 TABLET ORAL 2 TIMES DAILY WITH MEALS
Qty: 60 TABLET | Refills: 0 | Status: ON HOLD | OUTPATIENT
Start: 2022-05-19 | End: 2022-07-02

## 2022-05-19 RX ORDER — LISINOPRIL 20 MG/1
20 TABLET ORAL
Qty: 30 TABLET | Refills: 0 | Status: ON HOLD | OUTPATIENT
Start: 2022-05-19 | End: 2022-07-02

## 2022-05-19 RX ADMIN — HYDROCODONE BITARTRATE AND ACETAMINOPHEN 1 TABLET: 10; 325 TABLET ORAL at 14:16

## 2022-05-19 RX ADMIN — DOCUSATE SODIUM 100 MG: 100 CAPSULE ORAL at 09:09

## 2022-05-19 RX ADMIN — ISOSORBIDE MONONITRATE 30 MG: 30 TABLET, EXTENDED RELEASE ORAL at 09:09

## 2022-05-19 RX ADMIN — INSULIN ASPART 2 UNITS: 100 INJECTION, SOLUTION INTRAVENOUS; SUBCUTANEOUS at 12:12

## 2022-05-19 RX ADMIN — Medication 10 ML: at 09:10

## 2022-05-19 RX ADMIN — CLOPIDOGREL 75 MG: 75 TABLET, FILM COATED ORAL at 09:09

## 2022-05-19 RX ADMIN — CETIRIZINE HYDROCHLORIDE 5 MG: 10 TABLET, FILM COATED ORAL at 09:09

## 2022-05-19 RX ADMIN — HYDRALAZINE HYDROCHLORIDE 10 MG: 20 INJECTION INTRAMUSCULAR; INTRAVENOUS at 04:35

## 2022-05-19 RX ADMIN — CALCITRIOL 0.25 MCG: 0.25 CAPSULE ORAL at 09:07

## 2022-05-19 RX ADMIN — LEVOTHYROXINE SODIUM 50 MCG: 50 TABLET ORAL at 05:27

## 2022-05-19 RX ADMIN — PANTOPRAZOLE SODIUM 40 MG: 40 TABLET, DELAYED RELEASE ORAL at 09:09

## 2022-05-19 RX ADMIN — INSULIN ASPART 5 UNITS: 100 INJECTION, SOLUTION INTRAVENOUS; SUBCUTANEOUS at 17:26

## 2022-05-19 RX ADMIN — GABAPENTIN 300 MG: 300 CAPSULE ORAL at 09:07

## 2022-05-19 RX ADMIN — CARVEDILOL 12.5 MG: 6.25 TABLET, FILM COATED ORAL at 09:08

## 2022-05-19 RX ADMIN — PRAVASTATIN SODIUM 20 MG: 40 TABLET ORAL at 09:09

## 2022-05-19 RX ADMIN — ROPINIROLE HYDROCHLORIDE 0.5 MG: 0.25 TABLET, FILM COATED ORAL at 09:08

## 2022-05-19 RX ADMIN — DEXTROSE 15 G: 15 GEL ORAL at 07:17

## 2022-05-20 NOTE — OUTREACH NOTE
Prep Survey    Flowsheet Row Responses   [facility] patient discharged from? [facility]   Is LACE score < 7 ? No   Emergency Room discharge w/ pulse ox? No   Eligibility Readm Mgmt   Discharge diagnosis Subdural hemorrhage    Does the patient have one of the following disease processes/diagnoses(primary or secondary)? Other   Does the patient have Home health ordered? No   Is there a DME ordered? Yes   What DME was ordered?  wheel chair per Jesus Seth   Prep survey completed? Yes          CHUCKY GOMEZ - Registered Nurse

## 2022-05-25 ENCOUNTER — READMISSION MANAGEMENT (OUTPATIENT)
Dept: CALL CENTER | Facility: HOSPITAL | Age: 62
End: 2022-05-25

## 2022-05-25 LAB
METANEPH 24H UR-MRATE: 2 UG/24 HR (ref 36–209)
METANEPHS 24H UR-MCNC: 31 UG/L
NORMETANEPHRINE 24H UR-MCNC: 177 UG/L
NORMETANEPHRINE 24H UR-MRATE: 14 UG/24 HR (ref 131–612)

## 2022-05-25 NOTE — OUTREACH NOTE
Medical Week 1 Survey    Flowsheet Row Responses   [facility] discharged from? [facility]   Does the patient have one of the following disease processes/diagnoses(primary or secondary)? Other   Week 1 attempt successful? No   Unsuccessful attempts Attempt 1          JIGNA GOMEZ - Registered Nurse

## 2022-05-26 ENCOUNTER — READMISSION MANAGEMENT (OUTPATIENT)
Dept: CALL CENTER | Facility: HOSPITAL | Age: 62
End: 2022-05-26

## 2022-05-26 NOTE — OUTREACH NOTE
"Medical Week 1 Survey    Flowsheet Row Responses   [facility] patient discharged from? [facility]   Does the patient have one of the following disease processes/diagnoses(primary or secondary)? Other   Week 1 attempt successful? Yes   Call start time 1219   Call end time 1221   Discharge diagnosis Subdural hemorrhage    Meds reviewed with patient/caregiver? Yes   Is the patient having any side effects they believe may be caused by any medication additions or changes? No   Does the patient have all medications ordered at discharge? N/A   Is the patient taking all medications as directed (includes completed medication regime)? Yes   Does the patient have a primary care provider?  Yes   Does the patient have an appointment with their PCP within 7 days of discharge? Yes   Comments regarding PCP 5/24/22   Has the patient kept scheduled appointments due by today? Yes   What DME was ordered?  wheel chair per Jesus Seth   Has all DME been delivered? Yes   Psychosocial issues? No   Did the patient receive a copy of their discharge instructions? Yes   Nursing interventions Reviewed instructions with patient   What is the patient's perception of their health status since discharge? Improving  [Pt states, \"I'm doing a whole lot better\"]   Week 1 call completed? Yes          ROS TIAN - Registered Nurse  "

## 2022-06-10 ENCOUNTER — READMISSION MANAGEMENT (OUTPATIENT)
Dept: CALL CENTER | Facility: HOSPITAL | Age: 62
End: 2022-06-10

## 2022-06-10 NOTE — OUTREACH NOTE
Medical Week 4 Survey    Flowsheet Row Responses   [facility] patient discharged from? [facility]   Does the patient have one of the following disease processes/diagnoses(primary or secondary)? Other   Week 4 attempt successful? Yes   Call start time 1429   Call end time 1431   Discharge diagnosis Subdural hemorrhage    Person spoke with today (if not patient) and relationship Cliff/SO   Meds reviewed with patient/caregiver? Yes   Is the patient having any side effects they believe may be caused by any medication additions or changes? No   Is the patient taking all medications as directed (includes completed medication regime)? Yes   Has the patient kept scheduled appointments due by today? Yes   Is the patient still receiving Home Health Services? N/A   Psychosocial issues? No   What is the patient's perception of their health status since discharge? Improving   Is the patient/caregiver able to teach back signs and symptoms related to disease process for when to call PCP? Yes   Is the patient/caregiver able to teach back signs and symptoms related to disease process for when to call 911? Yes   Is the patient/caregiver able to teach back the hierarchy of who to call/visit for symptoms/problems? PCP, Specialist, Home health nurse, Urgent Care, ED, 911 Yes   If the patient is a current smoker, are they able to teach back resources for cessation? Not a smoker   Week 4 Call Completed? Yes   Wrap up additional comments SO reports pt. doing well, back to baseline, eating well. No questions or c/v at this time.           MOIZ CLAUDIO - Registered Nurse

## 2022-07-01 ENCOUNTER — APPOINTMENT (OUTPATIENT)
Dept: MRI IMAGING | Facility: HOSPITAL | Age: 62
End: 2022-07-01

## 2022-07-01 ENCOUNTER — APPOINTMENT (OUTPATIENT)
Dept: CT IMAGING | Facility: HOSPITAL | Age: 62
End: 2022-07-01

## 2022-07-01 ENCOUNTER — APPOINTMENT (OUTPATIENT)
Dept: GENERAL RADIOLOGY | Facility: HOSPITAL | Age: 62
End: 2022-07-01

## 2022-07-01 ENCOUNTER — HOSPITAL ENCOUNTER (INPATIENT)
Facility: HOSPITAL | Age: 62
LOS: 8 days | Discharge: HOME | End: 2022-07-10
Attending: FAMILY MEDICINE | Admitting: INTERNAL MEDICINE

## 2022-07-01 DIAGNOSIS — I95.9 HYPOTENSION, UNSPECIFIED HYPOTENSION TYPE: ICD-10-CM

## 2022-07-01 DIAGNOSIS — I63.9 CEREBROVASCULAR ACCIDENT (CVA), UNSPECIFIED MECHANISM: Primary | ICD-10-CM

## 2022-07-01 LAB
A-A DO2: 22.9 MMHG (ref 0–300)
ACETONE BLD QL: NEGATIVE
ALBUMIN SERPL-MCNC: 3.41 G/DL (ref 3.5–5.2)
ALBUMIN/GLOB SERPL: 1.3 G/DL
ALP SERPL-CCNC: 82 U/L (ref 39–117)
ALT SERPL W P-5'-P-CCNC: 16 U/L (ref 1–33)
ANION GAP SERPL CALCULATED.3IONS-SCNC: 12.6 MMOL/L (ref 5–15)
ARTERIAL PATENCY WRIST A: ABNORMAL
AST SERPL-CCNC: 22 U/L (ref 1–32)
ATMOSPHERIC PRESS: 727 MMHG
BASE EXCESS BLDA CALC-SCNC: 4 MMOL/L (ref 0–2)
BASOPHILS # BLD AUTO: 0.04 10*3/MM3 (ref 0–0.2)
BASOPHILS NFR BLD AUTO: 0.9 % (ref 0–1.5)
BDY SITE: ABNORMAL
BILIRUB SERPL-MCNC: 0.5 MG/DL (ref 0–1.2)
BODY TEMPERATURE: 0 C
BUN SERPL-MCNC: 37 MG/DL (ref 8–23)
BUN/CREAT SERPL: 10.9 (ref 7–25)
CALCIUM SPEC-SCNC: 8.6 MG/DL (ref 8.6–10.5)
CHLORIDE SERPL-SCNC: 100 MMOL/L (ref 98–107)
CO2 BLDA-SCNC: 29.3 MMOL/L (ref 22–33)
CO2 SERPL-SCNC: 25.4 MMOL/L (ref 22–29)
COHGB MFR BLD: 1.2 % (ref 0–5)
CREAT SERPL-MCNC: 3.38 MG/DL (ref 0.57–1)
DEPRECATED RDW RBC AUTO: 43.3 FL (ref 37–54)
EGFRCR SERPLBLD CKD-EPI 2021: 14.6 ML/MIN/1.73
EOSINOPHIL # BLD AUTO: 0.14 10*3/MM3 (ref 0–0.4)
EOSINOPHIL NFR BLD AUTO: 3.2 % (ref 0.3–6.2)
ERYTHROCYTE [DISTWIDTH] IN BLOOD BY AUTOMATED COUNT: 13.2 % (ref 12.3–15.4)
GLOBULIN UR ELPH-MCNC: 2.7 GM/DL
GLUCOSE BLDC GLUCOMTR-MCNC: 175 MG/DL (ref 70–130)
GLUCOSE SERPL-MCNC: 206 MG/DL (ref 65–99)
HCO3 BLDA-SCNC: 28.1 MMOL/L (ref 20–26)
HCT VFR BLD AUTO: 35.8 % (ref 34–46.6)
HCT VFR BLD CALC: 35.2 % (ref 38–51)
HGB BLD-MCNC: 11.8 G/DL (ref 12–15.9)
HGB BLDA-MCNC: 11.5 G/DL (ref 13.5–17.5)
HOLD SPECIMEN: NORMAL
HOLD SPECIMEN: NORMAL
IMM GRANULOCYTES # BLD AUTO: 0.01 10*3/MM3 (ref 0–0.05)
IMM GRANULOCYTES NFR BLD AUTO: 0.2 % (ref 0–0.5)
INHALED O2 CONCENTRATION: 21 %
INR PPP: 0.97 (ref 0.9–1.1)
LIPASE SERPL-CCNC: 12 U/L (ref 13–60)
LYMPHOCYTES # BLD AUTO: 1.21 10*3/MM3 (ref 0.7–3.1)
LYMPHOCYTES NFR BLD AUTO: 27.3 % (ref 19.6–45.3)
Lab: ABNORMAL
MCH RBC QN AUTO: 29.5 PG (ref 26.6–33)
MCHC RBC AUTO-ENTMCNC: 33 G/DL (ref 31.5–35.7)
MCV RBC AUTO: 89.5 FL (ref 79–97)
METHGB BLD QL: 0.4 % (ref 0–3)
MODALITY: ABNORMAL
MONOCYTES # BLD AUTO: 0.23 10*3/MM3 (ref 0.1–0.9)
MONOCYTES NFR BLD AUTO: 5.2 % (ref 5–12)
NEUTROPHILS NFR BLD AUTO: 2.8 10*3/MM3 (ref 1.7–7)
NEUTROPHILS NFR BLD AUTO: 63.2 % (ref 42.7–76)
NOTE: ABNORMAL
NRBC BLD AUTO-RTO: 0 /100 WBC (ref 0–0.2)
OXYHGB MFR BLDV: 94.9 % (ref 94–99)
PCO2 BLDA: 39.6 MM HG (ref 35–45)
PCO2 TEMP ADJ BLD: ABNORMAL MM[HG]
PH BLDA: 7.46 PH UNITS (ref 7.35–7.45)
PH, TEMP CORRECTED: ABNORMAL
PLATELET # BLD AUTO: 131 10*3/MM3 (ref 140–450)
PMV BLD AUTO: 9.6 FL (ref 6–12)
PO2 BLDA: 75.3 MM HG (ref 83–108)
PO2 TEMP ADJ BLD: ABNORMAL MM[HG]
POTASSIUM SERPL-SCNC: 3.8 MMOL/L (ref 3.5–5.2)
PROT SERPL-MCNC: 6.1 G/DL (ref 6–8.5)
PROTHROMBIN TIME: 13.1 SECONDS (ref 12.1–14.7)
RBC # BLD AUTO: 4 10*6/MM3 (ref 3.77–5.28)
SAO2 % BLDCOA: 96.4 % (ref 94–99)
SODIUM SERPL-SCNC: 138 MMOL/L (ref 136–145)
TROPONIN T SERPL-MCNC: 0.08 NG/ML (ref 0–0.03)
TROPONIN T SERPL-MCNC: 0.09 NG/ML (ref 0–0.03)
VENTILATOR MODE: ABNORMAL
WBC NRBC COR # BLD: 4.43 10*3/MM3 (ref 3.4–10.8)
WHOLE BLOOD HOLD COAG: NORMAL
WHOLE BLOOD HOLD SPECIMEN: NORMAL

## 2022-07-01 PROCEDURE — 84484 ASSAY OF TROPONIN QUANT: CPT | Performed by: FAMILY MEDICINE

## 2022-07-01 PROCEDURE — 71045 X-RAY EXAM CHEST 1 VIEW: CPT

## 2022-07-01 PROCEDURE — 70450 CT HEAD/BRAIN W/O DYE: CPT

## 2022-07-01 PROCEDURE — 70551 MRI BRAIN STEM W/O DYE: CPT

## 2022-07-01 PROCEDURE — 87636 SARSCOV2 & INF A&B AMP PRB: CPT | Performed by: FAMILY MEDICINE

## 2022-07-01 PROCEDURE — 25010000002 HYDRALAZINE PER 20 MG: Performed by: EMERGENCY MEDICINE

## 2022-07-01 PROCEDURE — 70450 CT HEAD/BRAIN W/O DYE: CPT | Performed by: RADIOLOGY

## 2022-07-01 PROCEDURE — 82375 ASSAY CARBOXYHB QUANT: CPT

## 2022-07-01 PROCEDURE — 25010000002 HYDRALAZINE PER 20 MG: Performed by: FAMILY MEDICINE

## 2022-07-01 PROCEDURE — 83050 HGB METHEMOGLOBIN QUAN: CPT

## 2022-07-01 PROCEDURE — 93005 ELECTROCARDIOGRAM TRACING: CPT | Performed by: FAMILY MEDICINE

## 2022-07-01 PROCEDURE — 83735 ASSAY OF MAGNESIUM: CPT | Performed by: PHYSICIAN ASSISTANT

## 2022-07-01 PROCEDURE — 82009 KETONE BODYS QUAL: CPT | Performed by: FAMILY MEDICINE

## 2022-07-01 PROCEDURE — 25010000002 PROCHLORPERAZINE 10 MG/2ML SOLUTION: Performed by: FAMILY MEDICINE

## 2022-07-01 PROCEDURE — 82805 BLOOD GASES W/O2 SATURATION: CPT

## 2022-07-01 PROCEDURE — 71045 X-RAY EXAM CHEST 1 VIEW: CPT | Performed by: RADIOLOGY

## 2022-07-01 PROCEDURE — 99223 1ST HOSP IP/OBS HIGH 75: CPT | Performed by: INTERNAL MEDICINE

## 2022-07-01 PROCEDURE — 36600 WITHDRAWAL OF ARTERIAL BLOOD: CPT

## 2022-07-01 PROCEDURE — 82962 GLUCOSE BLOOD TEST: CPT

## 2022-07-01 PROCEDURE — 99284 EMERGENCY DEPT VISIT MOD MDM: CPT

## 2022-07-01 PROCEDURE — 36415 COLL VENOUS BLD VENIPUNCTURE: CPT

## 2022-07-01 PROCEDURE — 93010 ELECTROCARDIOGRAM REPORT: CPT | Performed by: INTERNAL MEDICINE

## 2022-07-01 PROCEDURE — 80050 GENERAL HEALTH PANEL: CPT | Performed by: FAMILY MEDICINE

## 2022-07-01 PROCEDURE — 74176 CT ABD & PELVIS W/O CONTRAST: CPT | Performed by: RADIOLOGY

## 2022-07-01 PROCEDURE — 85610 PROTHROMBIN TIME: CPT | Performed by: FAMILY MEDICINE

## 2022-07-01 PROCEDURE — 83690 ASSAY OF LIPASE: CPT | Performed by: FAMILY MEDICINE

## 2022-07-01 PROCEDURE — 74176 CT ABD & PELVIS W/O CONTRAST: CPT

## 2022-07-01 RX ORDER — HYDRALAZINE HYDROCHLORIDE 20 MG/ML
10 INJECTION INTRAMUSCULAR; INTRAVENOUS ONCE
Status: COMPLETED | OUTPATIENT
Start: 2022-07-01 | End: 2022-07-01

## 2022-07-01 RX ORDER — SODIUM CHLORIDE 0.9 % (FLUSH) 0.9 %
10 SYRINGE (ML) INJECTION AS NEEDED
Status: DISCONTINUED | OUTPATIENT
Start: 2022-07-01 | End: 2022-07-10 | Stop reason: HOSPADM

## 2022-07-01 RX ORDER — PROCHLORPERAZINE EDISYLATE 5 MG/ML
2.5 INJECTION INTRAMUSCULAR; INTRAVENOUS ONCE
Status: COMPLETED | OUTPATIENT
Start: 2022-07-01 | End: 2022-07-02

## 2022-07-01 RX ORDER — HYDRALAZINE HYDROCHLORIDE 20 MG/ML
20 INJECTION INTRAMUSCULAR; INTRAVENOUS ONCE
Status: COMPLETED | OUTPATIENT
Start: 2022-07-01 | End: 2022-07-01

## 2022-07-01 RX ORDER — PROCHLORPERAZINE EDISYLATE 5 MG/ML
2.5 INJECTION INTRAMUSCULAR; INTRAVENOUS ONCE
Status: COMPLETED | OUTPATIENT
Start: 2022-07-01 | End: 2022-07-01

## 2022-07-01 RX ADMIN — HYDRALAZINE HYDROCHLORIDE 10 MG: 20 INJECTION INTRAMUSCULAR; INTRAVENOUS at 23:08

## 2022-07-01 RX ADMIN — HYDRALAZINE HYDROCHLORIDE 20 MG: 20 INJECTION INTRAMUSCULAR; INTRAVENOUS at 18:38

## 2022-07-01 RX ADMIN — PROCHLORPERAZINE EDISYLATE 2.5 MG: 5 INJECTION INTRAMUSCULAR; INTRAVENOUS at 23:08

## 2022-07-01 NOTE — ED NOTES
Patient refused to be in and out cathed for urine sample and stated she wanted to want to attempt to provide urine.

## 2022-07-01 NOTE — ED PROVIDER NOTES
Subjective   62-year-old female presents the emergency room with complaints of chest pain.  Patient reports that she had intermittent chest pain since yesterday.  She states she is been having nausea vomiting as well.  She states that she feels that it may be related to her diabetes.  She denies abdominal pain.  She reports that she feels confused as well.  She this is been ongoing for 1 day.  She feels like symptoms have been persistent with regards to her confusion and her nausea vomiting.  She states she takes Lantus and Humalog for diabetes and denies missing any of her medications.  She denies fever or chills.  She denies focal weakness or numbness.      Chest Pain  Pain location:  Substernal area  Pain quality: sharp    Pain radiates to:  Does not radiate  Pain severity:  Moderate  Timing:  Intermittent  Progression:  Waxing and waning  Relieved by:  Nothing  Worsened by:  Nothing  Associated symptoms: fatigue, nausea and vomiting    Associated symptoms: no back pain, no cough and no shortness of breath        Review of Systems   Constitutional: Positive for fatigue.   Respiratory: Negative for cough and shortness of breath.    Cardiovascular: Positive for chest pain.   Gastrointestinal: Positive for nausea and vomiting.   Musculoskeletal: Negative for back pain.   All other systems reviewed and are negative.      Past Medical History:   Diagnosis Date   • Arthritis    • Closed fracture of right fibula and tibia 12/25/2018   • Depression    • Diabetic ulcer of left foot associated with type 2 diabetes mellitus (HCC) 6/23/2017   • Diastolic dysfunction    • Disease of thyroid gland    • Elevated cholesterol    • End stage renal disease (HCC)    • Essential hypertension    • GERD (gastroesophageal reflux disease)    • History of transfusion    • Hyperlipidemia    • Iron deficiency anemia 12/30/2018   • PAD (peripheral artery disease) ([facility])    • Renal failure    • Type 2 diabetes mellitus with hyperglycemia, with  long-term current use of insulin ([facility]) 2018       Allergies   Allergen Reactions   • Penicillins Hives and GI Intolerance     Patient has received cefazolin, ceftriaxone and cefepime during past admissions.   • Codeine Hives, Rash and GI Intolerance     Pt tolerates Norco @ home   • Sulfa Antibiotics Hives, Rash and GI Intolerance     NAUSEA TOO       Past Surgical History:   Procedure Laterality Date   • ABDOMINAL SURGERY     • BACK SURGERY      Post spinal diskectomy, osteophytectomy in one lumbar interspace   • CATARACT EXTRACTION      Left    •  SECTION     • DENTAL PROCEDURE     • ENDOSCOPY     • FRACTURE SURGERY      right leg   • HYSTERECTOMY     • INCISION AND DRAINAGE LEG Left 4/15/2017    Procedure: INCISION AND DRAINAGE LOWER EXTREMITY;  Surgeon: Basilio Morris MD;  Location: [facility] OR;  Service:    • INCISION AND DRAINAGE LEG Left 2017    Procedure: Irrigation and Debridment abcess diabetic wound left foot with deep culture;  Surgeon: Basilio Morris MD;  Location: [facility] OR;  Service:    • INSERTION PERITONEAL DIALYSIS CATHETER N/A 2021    Procedure: INSERTION PERITONEAL DIALYSIS CATHETER LAPAROSCOPIC;  Surgeon: Edy Glover MD;  Location: [facility] OR;  Service: General;  Laterality: N/A;   • KNEE ARTHROSCOPY Right    • ORIF TIBIA FRACTURE Right 2018    Procedure: TIBIAL  FRACTURE OPEN REDUCTION INTERNAL FIXATION;  Surgeon: Jung Barragan MD;  Location: [facility] OR;  Service: Orthopedics   • TOE AMPUTATION Right     5th   • TUBAL ABDOMINAL LIGATION         Family History   Problem Relation Age of Onset   • Heart disease Mother    • Cancer Mother    • COPD Mother    • Diabetes Other    • Depression Other        Social History     Socioeconomic History   • Marital status:    Tobacco Use   • Smoking status: Never Smoker   • Smokeless tobacco: Never Used   Vaping Use   • Vaping Use: Never used   Substance and Sexual Activity   • Alcohol use: No   • Drug use: No   •  Sexual activity: Defer           Objective   Physical Exam  Nursing note reviewed.   Constitutional:       Comments: Elderly.  Nontoxic appearing.  Frail.   HENT:      Head: Normocephalic and atraumatic.   Eyes:      Extraocular Movements: Extraocular movements intact.      Pupils: Pupils are equal, round, and reactive to light.   Cardiovascular:      Rate and Rhythm: Normal rate and regular rhythm.      Comments: 2+ radial pulse bilaterally.  No extrasystoles.  Pulmonary:      Breath sounds: Examination of the right-lower field reveals decreased breath sounds. Examination of the left-lower field reveals decreased breath sounds. Decreased breath sounds present.      Comments: No rhonchi's rales or wheezes.  No accessory muscle use.  Abdominal:      General: Bowel sounds are normal.      Palpations: Abdomen is soft.      Tenderness: There is no guarding.   Musculoskeletal:      Cervical back: Neck supple.      Comments: No calf tenderness appreciated.   Skin:     General: Skin is warm and dry.      Capillary Refill: Capillary refill takes less than 2 seconds.   Neurological:      General: No focal deficit present.      Mental Status: She is alert and oriented to person, place, and time.      Cranial Nerves: No cranial nerve deficit.   Psychiatric:         Mood and Affect: Mood normal.         Procedures           ED Course  ED Course as of 07/01/22 2350   Fri Jul 01, 2022   1759 Patient is noted to have elevated creatinine 3.3 it is close to her baseline 3.22.  Patient BUN 37.  Glucose 206.  Patient is not in DKA.  Lipase 12.  Patient was count 4.43. [BB]   1815 ECG 12 Lead  Vent. rate 93 BPM  OH interval 134 ms  QRS duration 84 ms  QT/QTcB 404/502 ms  P-R-T axes 78 4 35  Normal sinus rhythm  Possible Left atrial enlargement  Nonspecific ST abnormality  Prolonged QT  Abnormal ECG  When compared with ECG of 05-MAY-2022 19:30,  QRS duration has decreased [ES]   1842 Is noted to be hypertensive.  Patient is noted to  have elevated troponin 0.085.  There is no ST elevation appreciated on EKG.  Patient is chronically elevated but is higher than previous checks. [BB]   1843 CT scan of head is unremarkable for acute abnormality [BB]   1843 CT Abdomen Pelvis Without Contrast    Result Date: 7/1/2022   1. Small volume ascites or dialysate. Patient has percutaneous dialysis catheter.  2.No evidence of bowel obstruction 3. Other findings as above       This report was finalized on 7/1/2022 6:26 PM by Dr. Boris Caballero MD.      CT Head Without Contrast    Result Date: 7/1/2022    1. Atrophy and chronic microangiopathic change 2. No parenchymal mass, hemorrhage, or midline shift  This report was finalized on 7/1/2022 6:16 PM by Dr. Boris Caballero MD.      XR Chest 1 View    Result Date: 7/1/2022  No radiographic evidence of acute cardiac or pulmonary disease.  This report was finalized on 7/1/2022 5:33 PM by Dr. Boris Caballero MD.       [BB]   1848 Patient with elevated blood pressure and patient was given IV hydralazine while in the emergency room continue to monitor. [BB]   1852 Patient repeat blood pressure currently is 177/86. [BB]   1955 Repeat EKG: Sinus tachycardia ventricular 105 parable 130 QRS 84 . [BB]   2006 Patient troponin is trending down from 0.085 to 0.081 [BB]   2106 Patient's MRI shows findings of subacute versus acute infarcts.  Patient symptoms have been ongoing since yesterday.  Have spoken to Dr. Jaramillo with neurology who states she will see patient.  Patient will likely be admitted to hospital. [BB]   2342 Have spoken to hospitalist who is agreeable to admit [BB]      ED Course User Index  [BB] Rodrigo Hastings MD  [ES] Clay Maradiaga MD                                           [facility]    Final diagnoses:   Cerebrovascular accident (CVA), unspecified mechanism (HCC)       ED Disposition  ED Disposition     ED Disposition   Decision to Admit    Condition   --    Comment   --             No  follow-up provider specified.       Medication List      No changes were made to your prescriptions during this visit.          Rodrigo Hastings MD  07/01/22 6005

## 2022-07-01 NOTE — ED TRIAGE NOTES
MEDICAL SCREENING:    Reason for Visit:     62-year-old female presents the emergency room with complaints of chest pain.  Patient reports that she had intermittent chest pain since yesterday.  She states she is been having nausea vomiting as well.  She states that she feels that it may be related to her diabetes.  She denies abdominal pain.  She reports that she feels confused as well.  She this is been ongoing for 1 day.  She feels like symptoms have been persistent with regards to her confusion and her nausea vomiting.  She states she takes Lantus and Humalog for diabetes and denies missing any of her medications.  She denies fever or chills.  She denies focal weakness or numbness.    Patient initially seen in triage.  The patient was advised further evaluation and diagnostic testing will be needed, some of the treatment and testing will be initiated in the lobby in order to begin the process.  The patient will be returned to the waiting area for the time being and possibly be re-assessed by a subsequent ED provider.  The patient will be brought back to the treatment area in as timely manner as possible.

## 2022-07-02 ENCOUNTER — APPOINTMENT (OUTPATIENT)
Dept: MRI IMAGING | Facility: HOSPITAL | Age: 62
End: 2022-07-02

## 2022-07-02 ENCOUNTER — APPOINTMENT (OUTPATIENT)
Dept: CARDIOLOGY | Facility: HOSPITAL | Age: 62
End: 2022-07-02

## 2022-07-02 PROBLEM — I63.9 CEREBROVASCULAR ACCIDENT (CVA), UNSPECIFIED MECHANISM: Status: ACTIVE | Noted: 2022-07-02

## 2022-07-02 LAB
ANION GAP SERPL CALCULATED.3IONS-SCNC: 15.9 MMOL/L (ref 5–15)
BACTERIA UR QL AUTO: ABNORMAL /HPF
BASOPHILS # BLD AUTO: 0.06 10*3/MM3 (ref 0–0.2)
BASOPHILS NFR BLD AUTO: 0.9 % (ref 0–1.5)
BH CV ECHO MEAS - ACS: 1.8 CM
BH CV ECHO MEAS - AO MAX PG: 11.3 MMHG
BH CV ECHO MEAS - AO MEAN PG: 6 MMHG
BH CV ECHO MEAS - AO ROOT DIAM: 2.6 CM
BH CV ECHO MEAS - AO V2 MAX: 168 CM/SEC
BH CV ECHO MEAS - AO V2 VTI: 28.2 CM
BH CV ECHO MEAS - EDV(CUBED): 73 ML
BH CV ECHO MEAS - EDV(MOD-SP4): 40.2 ML
BH CV ECHO MEAS - EF(MOD-SP4): 52 %
BH CV ECHO MEAS - ESV(CUBED): 27 ML
BH CV ECHO MEAS - ESV(MOD-SP4): 19.3 ML
BH CV ECHO MEAS - FS: 28.2 %
BH CV ECHO MEAS - IVS/LVPW: 0.84 CM
BH CV ECHO MEAS - IVSD: 0.98 CM
BH CV ECHO MEAS - LA DIMENSION: 3 CM
BH CV ECHO MEAS - LAT PEAK E' VEL: 7.6 CM/SEC
BH CV ECHO MEAS - LV MASS(C)D: 149.8 GRAMS
BH CV ECHO MEAS - LVIDD: 4.2 CM
BH CV ECHO MEAS - LVIDS: 3 CM
BH CV ECHO MEAS - LVOT AREA: 2.27 CM2
BH CV ECHO MEAS - LVOT DIAM: 1.7 CM
BH CV ECHO MEAS - LVPWD: 1.16 CM
BH CV ECHO MEAS - MED PEAK E' VEL: 6.4 CM/SEC
BH CV ECHO MEAS - MV A MAX VEL: 106 CM/SEC
BH CV ECHO MEAS - MV E MAX VEL: 46.9 CM/SEC
BH CV ECHO MEAS - MV E/A: 0.44
BH CV ECHO MEAS - PA ACC TIME: 0.08 SEC
BH CV ECHO MEAS - PA PR(ACCEL): 44.4 MMHG
BH CV ECHO MEAS - SV(MOD-SP4): 20.9 ML
BH CV ECHO MEAS - TAPSE (>1.6): 2.35 CM
BH CV ECHO MEASUREMENTS AVERAGE E/E' RATIO: 6.7
BILIRUB UR QL STRIP: NEGATIVE
BUN SERPL-MCNC: 42 MG/DL (ref 8–23)
BUN/CREAT SERPL: 11.1 (ref 7–25)
CALCIUM SPEC-SCNC: 8.7 MG/DL (ref 8.6–10.5)
CHLORIDE SERPL-SCNC: 101 MMOL/L (ref 98–107)
CHOLEST SERPL-MCNC: 202 MG/DL (ref 0–200)
CLARITY UR: CLEAR
CO2 SERPL-SCNC: 25.1 MMOL/L (ref 22–29)
COLOR UR: YELLOW
CREAT SERPL-MCNC: 3.78 MG/DL (ref 0.57–1)
CRP SERPL-MCNC: <0.3 MG/DL (ref 0–0.5)
DEPRECATED RDW RBC AUTO: 43.6 FL (ref 37–54)
EGFRCR SERPLBLD CKD-EPI 2021: 12.8 ML/MIN/1.73
EOSINOPHIL # BLD AUTO: 0.03 10*3/MM3 (ref 0–0.4)
EOSINOPHIL NFR BLD AUTO: 0.5 % (ref 0.3–6.2)
ERYTHROCYTE [DISTWIDTH] IN BLOOD BY AUTOMATED COUNT: 13.7 % (ref 12.3–15.4)
ERYTHROCYTE [SEDIMENTATION RATE] IN BLOOD: 8 MM/HR (ref 0–30)
FLUAV RNA RESP QL NAA+PROBE: NOT DETECTED
FLUBV RNA RESP QL NAA+PROBE: NOT DETECTED
FOLATE SERPL-MCNC: 9.87 NG/ML (ref 4.78–24.2)
GLUCOSE BLDC GLUCOMTR-MCNC: 120 MG/DL (ref 70–130)
GLUCOSE BLDC GLUCOMTR-MCNC: 181 MG/DL (ref 70–130)
GLUCOSE BLDC GLUCOMTR-MCNC: 187 MG/DL (ref 70–130)
GLUCOSE SERPL-MCNC: 219 MG/DL (ref 65–99)
GLUCOSE UR STRIP-MCNC: ABNORMAL MG/DL
HBA1C MFR BLD: 7.6 % (ref 4.8–5.6)
HCT VFR BLD AUTO: 34.1 % (ref 34–46.6)
HDLC SERPL-MCNC: 84 MG/DL (ref 40–60)
HGB BLD-MCNC: 11.2 G/DL (ref 12–15.9)
HGB UR QL STRIP.AUTO: NEGATIVE
HYALINE CASTS UR QL AUTO: ABNORMAL /LPF
IMM GRANULOCYTES # BLD AUTO: 0.02 10*3/MM3 (ref 0–0.05)
IMM GRANULOCYTES NFR BLD AUTO: 0.3 % (ref 0–0.5)
KETONES UR QL STRIP: NEGATIVE
LDLC SERPL CALC-MCNC: 104 MG/DL (ref 0–100)
LDLC/HDLC SERPL: 1.21 {RATIO}
LEFT ATRIUM VOLUME INDEX: 20.6 ML/M2
LEUKOCYTE ESTERASE UR QL STRIP.AUTO: NEGATIVE
LYMPHOCYTES # BLD AUTO: 1.34 10*3/MM3 (ref 0.7–3.1)
LYMPHOCYTES NFR BLD AUTO: 20.2 % (ref 19.6–45.3)
MAGNESIUM SERPL-MCNC: 1.5 MG/DL (ref 1.6–2.4)
MAGNESIUM SERPL-MCNC: 1.6 MG/DL (ref 1.6–2.4)
MAXIMAL PREDICTED HEART RATE: 156 BPM
MCH RBC QN AUTO: 29.1 PG (ref 26.6–33)
MCHC RBC AUTO-ENTMCNC: 32.8 G/DL (ref 31.5–35.7)
MCV RBC AUTO: 88.6 FL (ref 79–97)
MONOCYTES # BLD AUTO: 0.31 10*3/MM3 (ref 0.1–0.9)
MONOCYTES NFR BLD AUTO: 4.7 % (ref 5–12)
NEUTROPHILS NFR BLD AUTO: 4.86 10*3/MM3 (ref 1.7–7)
NEUTROPHILS NFR BLD AUTO: 73.4 % (ref 42.7–76)
NITRITE UR QL STRIP: NEGATIVE
NRBC BLD AUTO-RTO: 0 /100 WBC (ref 0–0.2)
PH UR STRIP.AUTO: 5.5 [PH] (ref 5–8)
PHOSPHATE SERPL-MCNC: 4 MG/DL (ref 2.5–4.5)
PLATELET # BLD AUTO: 152 10*3/MM3 (ref 140–450)
PMV BLD AUTO: 9.7 FL (ref 6–12)
POTASSIUM SERPL-SCNC: 3.4 MMOL/L (ref 3.5–5.2)
PROT UR QL STRIP: ABNORMAL
QT INTERVAL: 398 MS
QT INTERVAL: 404 MS
QT INTERVAL: 430 MS
QTC INTERVAL: 502 MS
QTC INTERVAL: 545 MS
QTC INTERVAL: 568 MS
RBC # BLD AUTO: 3.85 10*6/MM3 (ref 3.77–5.28)
RBC # UR STRIP: ABNORMAL /HPF
REF LAB TEST METHOD: ABNORMAL
SARS-COV-2 RNA RESP QL NAA+PROBE: NOT DETECTED
SODIUM SERPL-SCNC: 142 MMOL/L (ref 136–145)
SP GR UR STRIP: 1.01 (ref 1–1.03)
SQUAMOUS #/AREA URNS HPF: ABNORMAL /HPF
STRESS TARGET HR: 133 BPM
TRIGL SERPL-MCNC: 80 MG/DL (ref 0–150)
TROPONIN T SERPL-MCNC: 0.07 NG/ML (ref 0–0.03)
TSH SERPL DL<=0.05 MIU/L-ACNC: 7.68 UIU/ML (ref 0.27–4.2)
UROBILINOGEN UR QL STRIP: ABNORMAL
VIT B12 BLD-MCNC: 528 PG/ML (ref 211–946)
VLDLC SERPL-MCNC: 14 MG/DL (ref 5–40)
WBC # UR STRIP: ABNORMAL /HPF
WBC NRBC COR # BLD: 6.62 10*3/MM3 (ref 3.4–10.8)

## 2022-07-02 PROCEDURE — 94799 UNLISTED PULMONARY SVC/PX: CPT

## 2022-07-02 PROCEDURE — 72141 MRI NECK SPINE W/O DYE: CPT

## 2022-07-02 PROCEDURE — 25010000002 ONDANSETRON PER 1 MG: Performed by: STUDENT IN AN ORGANIZED HEALTH CARE EDUCATION/TRAINING PROGRAM

## 2022-07-02 PROCEDURE — 25010000002 HYDRALAZINE PER 20 MG: Performed by: PHYSICIAN ASSISTANT

## 2022-07-02 PROCEDURE — 25010000002 PROCHLORPERAZINE 10 MG/2ML SOLUTION: Performed by: PHYSICIAN ASSISTANT

## 2022-07-02 PROCEDURE — 81001 URINALYSIS AUTO W/SCOPE: CPT | Performed by: FAMILY MEDICINE

## 2022-07-02 PROCEDURE — 85025 COMPLETE CBC W/AUTO DIFF WBC: CPT | Performed by: PHYSICIAN ASSISTANT

## 2022-07-02 PROCEDURE — 82962 GLUCOSE BLOOD TEST: CPT

## 2022-07-02 PROCEDURE — 93010 ELECTROCARDIOGRAM REPORT: CPT | Performed by: INTERNAL MEDICINE

## 2022-07-02 PROCEDURE — 92523 SPEECH SOUND LANG COMPREHEN: CPT

## 2022-07-02 PROCEDURE — 80048 BASIC METABOLIC PNL TOTAL CA: CPT | Performed by: PHYSICIAN ASSISTANT

## 2022-07-02 PROCEDURE — 93306 TTE W/DOPPLER COMPLETE: CPT

## 2022-07-02 PROCEDURE — 86140 C-REACTIVE PROTEIN: CPT | Performed by: PHYSICIAN ASSISTANT

## 2022-07-02 PROCEDURE — 25010000002 PROCHLORPERAZINE 10 MG/2ML SOLUTION: Performed by: FAMILY MEDICINE

## 2022-07-02 PROCEDURE — 70544 MR ANGIOGRAPHY HEAD W/O DYE: CPT | Performed by: RADIOLOGY

## 2022-07-02 PROCEDURE — 94761 N-INVAS EAR/PLS OXIMETRY MLT: CPT

## 2022-07-02 PROCEDURE — 93306 TTE W/DOPPLER COMPLETE: CPT | Performed by: INTERNAL MEDICINE

## 2022-07-02 PROCEDURE — 70547 MR ANGIOGRAPHY NECK W/O DYE: CPT

## 2022-07-02 PROCEDURE — 80061 LIPID PANEL: CPT | Performed by: INTERNAL MEDICINE

## 2022-07-02 PROCEDURE — 83036 HEMOGLOBIN GLYCOSYLATED A1C: CPT | Performed by: INTERNAL MEDICINE

## 2022-07-02 PROCEDURE — 93005 ELECTROCARDIOGRAM TRACING: CPT | Performed by: INTERNAL MEDICINE

## 2022-07-02 PROCEDURE — 82607 VITAMIN B-12: CPT | Performed by: INTERNAL MEDICINE

## 2022-07-02 PROCEDURE — 83735 ASSAY OF MAGNESIUM: CPT | Performed by: PHYSICIAN ASSISTANT

## 2022-07-02 PROCEDURE — 92610 EVALUATE SWALLOWING FUNCTION: CPT

## 2022-07-02 PROCEDURE — 84484 ASSAY OF TROPONIN QUANT: CPT | Performed by: INTERNAL MEDICINE

## 2022-07-02 PROCEDURE — 70547 MR ANGIOGRAPHY NECK W/O DYE: CPT | Performed by: RADIOLOGY

## 2022-07-02 PROCEDURE — 87040 BLOOD CULTURE FOR BACTERIA: CPT | Performed by: PHYSICIAN ASSISTANT

## 2022-07-02 PROCEDURE — 82746 ASSAY OF FOLIC ACID SERUM: CPT | Performed by: INTERNAL MEDICINE

## 2022-07-02 PROCEDURE — 72141 MRI NECK SPINE W/O DYE: CPT | Performed by: RADIOLOGY

## 2022-07-02 PROCEDURE — 85652 RBC SED RATE AUTOMATED: CPT | Performed by: PHYSICIAN ASSISTANT

## 2022-07-02 PROCEDURE — 84100 ASSAY OF PHOSPHORUS: CPT | Performed by: INTERNAL MEDICINE

## 2022-07-02 PROCEDURE — 99232 SBSQ HOSP IP/OBS MODERATE 35: CPT | Performed by: STUDENT IN AN ORGANIZED HEALTH CARE EDUCATION/TRAINING PROGRAM

## 2022-07-02 PROCEDURE — 63710000001 INSULIN ASPART PER 5 UNITS: Performed by: INTERNAL MEDICINE

## 2022-07-02 PROCEDURE — 70544 MR ANGIOGRAPHY HEAD W/O DYE: CPT

## 2022-07-02 RX ORDER — DOCUSATE SODIUM 100 MG/1
100 CAPSULE, LIQUID FILLED ORAL 2 TIMES DAILY
Status: DISCONTINUED | OUTPATIENT
Start: 2022-07-02 | End: 2022-07-10 | Stop reason: HOSPADM

## 2022-07-02 RX ORDER — NICOTINE POLACRILEX 4 MG
15 LOZENGE BUCCAL
Status: DISCONTINUED | OUTPATIENT
Start: 2022-07-02 | End: 2022-07-04 | Stop reason: SDUPTHER

## 2022-07-02 RX ORDER — ONDANSETRON 2 MG/ML
4 INJECTION INTRAMUSCULAR; INTRAVENOUS EVERY 6 HOURS PRN
Status: DISCONTINUED | OUTPATIENT
Start: 2022-07-02 | End: 2022-07-10 | Stop reason: HOSPADM

## 2022-07-02 RX ORDER — CARVEDILOL 12.5 MG/1
12.5 TABLET ORAL 2 TIMES DAILY WITH MEALS
COMMUNITY
End: 2022-07-10 | Stop reason: HOSPADM

## 2022-07-02 RX ORDER — ISOSORBIDE MONONITRATE 30 MG/1
30 TABLET, EXTENDED RELEASE ORAL DAILY
Status: CANCELLED | OUTPATIENT
Start: 2022-07-02

## 2022-07-02 RX ORDER — INSULIN ASPART 100 [IU]/ML
0-7 INJECTION, SOLUTION INTRAVENOUS; SUBCUTANEOUS
Status: DISCONTINUED | OUTPATIENT
Start: 2022-07-02 | End: 2022-07-03

## 2022-07-02 RX ORDER — NITROGLYCERIN 0.4 MG/1
0.4 TABLET SUBLINGUAL
Status: DISCONTINUED | OUTPATIENT
Start: 2022-07-02 | End: 2022-07-10 | Stop reason: HOSPADM

## 2022-07-02 RX ORDER — CETIRIZINE HYDROCHLORIDE 10 MG/1
5 TABLET ORAL DAILY
Status: DISCONTINUED | OUTPATIENT
Start: 2022-07-02 | End: 2022-07-10 | Stop reason: HOSPADM

## 2022-07-02 RX ORDER — POTASSIUM CHLORIDE 20 MEQ/1
20 TABLET, EXTENDED RELEASE ORAL DAILY
COMMUNITY
End: 2022-07-10 | Stop reason: HOSPADM

## 2022-07-02 RX ORDER — ONDANSETRON 4 MG/1
4 TABLET, ORALLY DISINTEGRATING ORAL DAILY PRN
COMMUNITY

## 2022-07-02 RX ORDER — LORAZEPAM 0.5 MG/1
0.5 TABLET ORAL ONCE
Status: COMPLETED | OUTPATIENT
Start: 2022-07-02 | End: 2022-07-02

## 2022-07-02 RX ORDER — ATORVASTATIN CALCIUM 40 MG/1
80 TABLET, FILM COATED ORAL NIGHTLY
Status: DISCONTINUED | OUTPATIENT
Start: 2022-07-02 | End: 2022-07-10 | Stop reason: HOSPADM

## 2022-07-02 RX ORDER — SODIUM CHLORIDE 0.9 % (FLUSH) 0.9 %
10 SYRINGE (ML) INJECTION AS NEEDED
Status: DISCONTINUED | OUTPATIENT
Start: 2022-07-02 | End: 2022-07-10 | Stop reason: HOSPADM

## 2022-07-02 RX ORDER — HYDROXYZINE HYDROCHLORIDE 25 MG/1
25 TABLET, FILM COATED ORAL 3 TIMES DAILY PRN
Status: DISCONTINUED | OUTPATIENT
Start: 2022-07-02 | End: 2022-07-10 | Stop reason: HOSPADM

## 2022-07-02 RX ORDER — ISOSORBIDE MONONITRATE 30 MG/1
30 TABLET, EXTENDED RELEASE ORAL DAILY
COMMUNITY
End: 2022-07-10 | Stop reason: HOSPADM

## 2022-07-02 RX ORDER — HYDRALAZINE HYDROCHLORIDE 25 MG/1
50 TABLET, FILM COATED ORAL 2 TIMES DAILY PRN
Status: CANCELLED | OUTPATIENT
Start: 2022-07-02

## 2022-07-02 RX ORDER — LEVOTHYROXINE SODIUM 0.05 MG/1
50 TABLET ORAL
Status: DISCONTINUED | OUTPATIENT
Start: 2022-07-02 | End: 2022-07-02

## 2022-07-02 RX ORDER — HYDROCODONE BITARTRATE AND ACETAMINOPHEN 10; 325 MG/1; MG/1
1 TABLET ORAL 3 TIMES DAILY PRN
Status: DISCONTINUED | OUTPATIENT
Start: 2022-07-02 | End: 2022-07-10 | Stop reason: HOSPADM

## 2022-07-02 RX ORDER — ONDANSETRON 4 MG/1
4 TABLET, ORALLY DISINTEGRATING ORAL DAILY PRN
Status: CANCELLED | OUTPATIENT
Start: 2022-07-02

## 2022-07-02 RX ORDER — ROPINIROLE 0.25 MG/1
0.5 TABLET, FILM COATED ORAL 2 TIMES DAILY
Status: DISCONTINUED | OUTPATIENT
Start: 2022-07-02 | End: 2022-07-10 | Stop reason: HOSPADM

## 2022-07-02 RX ORDER — PANTOPRAZOLE SODIUM 40 MG/1
40 TABLET, DELAYED RELEASE ORAL
Status: DISCONTINUED | OUTPATIENT
Start: 2022-07-02 | End: 2022-07-10 | Stop reason: HOSPADM

## 2022-07-02 RX ORDER — PROCHLORPERAZINE EDISYLATE 5 MG/ML
2.5 INJECTION INTRAMUSCULAR; INTRAVENOUS EVERY 6 HOURS PRN
Status: DISCONTINUED | OUTPATIENT
Start: 2022-07-02 | End: 2022-07-02

## 2022-07-02 RX ORDER — PROCHLORPERAZINE EDISYLATE 5 MG/ML
2.5 INJECTION INTRAMUSCULAR; INTRAVENOUS EVERY 8 HOURS PRN
Status: DISCONTINUED | OUTPATIENT
Start: 2022-07-02 | End: 2022-07-10 | Stop reason: HOSPADM

## 2022-07-02 RX ORDER — PANTOPRAZOLE SODIUM 40 MG/1
40 TABLET, DELAYED RELEASE ORAL DAILY
COMMUNITY

## 2022-07-02 RX ORDER — HYDRALAZINE HYDROCHLORIDE 50 MG/1
50 TABLET, FILM COATED ORAL 2 TIMES DAILY PRN
COMMUNITY
End: 2022-07-10 | Stop reason: HOSPADM

## 2022-07-02 RX ORDER — DILTIAZEM HCL IN NACL,ISO-OSM 125 MG/125
5-15 PLASTIC BAG, INJECTION (ML) INTRAVENOUS CONTINUOUS
Status: DISCONTINUED | OUTPATIENT
Start: 2022-07-02 | End: 2022-07-03

## 2022-07-02 RX ORDER — FLUOXETINE HYDROCHLORIDE 20 MG/1
40 CAPSULE ORAL DAILY
Status: CANCELLED | OUTPATIENT
Start: 2022-07-02

## 2022-07-02 RX ORDER — CALCITRIOL 0.25 UG/1
0.25 CAPSULE, LIQUID FILLED ORAL DAILY
Status: DISCONTINUED | OUTPATIENT
Start: 2022-07-02 | End: 2022-07-02

## 2022-07-02 RX ORDER — DEXTROSE MONOHYDRATE 25 G/50ML
25 INJECTION, SOLUTION INTRAVENOUS
Status: DISCONTINUED | OUTPATIENT
Start: 2022-07-02 | End: 2022-07-04 | Stop reason: SDUPTHER

## 2022-07-02 RX ORDER — DIPHENOXYLATE HYDROCHLORIDE AND ATROPINE SULFATE 2.5; .025 MG/1; MG/1
1 TABLET ORAL DAILY
Status: DISCONTINUED | OUTPATIENT
Start: 2022-07-02 | End: 2022-07-10 | Stop reason: HOSPADM

## 2022-07-02 RX ORDER — CARVEDILOL 6.25 MG/1
12.5 TABLET ORAL 2 TIMES DAILY WITH MEALS
Status: CANCELLED | OUTPATIENT
Start: 2022-07-02

## 2022-07-02 RX ORDER — LISINOPRIL 10 MG/1
20 TABLET ORAL NIGHTLY
Status: CANCELLED | OUTPATIENT
Start: 2022-07-02

## 2022-07-02 RX ORDER — FLUOXETINE HYDROCHLORIDE 40 MG/1
40 CAPSULE ORAL EVERY MORNING
COMMUNITY

## 2022-07-02 RX ORDER — GABAPENTIN 300 MG/1
300 CAPSULE ORAL DAILY
Status: DISCONTINUED | OUTPATIENT
Start: 2022-07-02 | End: 2022-07-10 | Stop reason: HOSPADM

## 2022-07-02 RX ORDER — PANTOPRAZOLE SODIUM 40 MG/1
40 TABLET, DELAYED RELEASE ORAL DAILY
Status: CANCELLED | OUTPATIENT
Start: 2022-07-02

## 2022-07-02 RX ORDER — TIZANIDINE 4 MG/1
2 TABLET ORAL EVERY 8 HOURS PRN
Status: DISCONTINUED | OUTPATIENT
Start: 2022-07-02 | End: 2022-07-10 | Stop reason: HOSPADM

## 2022-07-02 RX ORDER — ACETAMINOPHEN 325 MG/1
650 TABLET ORAL EVERY 6 HOURS PRN
Status: DISCONTINUED | OUTPATIENT
Start: 2022-07-02 | End: 2022-07-10 | Stop reason: HOSPADM

## 2022-07-02 RX ORDER — FOLIC ACID 1 MG/1
1 TABLET ORAL DAILY
Status: DISCONTINUED | OUTPATIENT
Start: 2022-07-02 | End: 2022-07-10 | Stop reason: HOSPADM

## 2022-07-02 RX ORDER — HYDROCODONE BITARTRATE AND ACETAMINOPHEN 10; 325 MG/1; MG/1
1 TABLET ORAL EVERY 8 HOURS PRN
Status: DISCONTINUED | OUTPATIENT
Start: 2022-07-02 | End: 2022-07-02

## 2022-07-02 RX ORDER — LEVOTHYROXINE SODIUM 0.03 MG/1
25 TABLET ORAL DAILY
COMMUNITY

## 2022-07-02 RX ORDER — CLONIDINE HYDROCHLORIDE 0.1 MG/1
0.1 TABLET ORAL ONCE
Status: DISCONTINUED | OUTPATIENT
Start: 2022-07-02 | End: 2022-07-02

## 2022-07-02 RX ORDER — METHION/INOS/CHOL BT/B COM/LIV 110MG-86MG
100 CAPSULE ORAL DAILY
Status: DISCONTINUED | OUTPATIENT
Start: 2022-07-02 | End: 2022-07-10 | Stop reason: HOSPADM

## 2022-07-02 RX ORDER — POTASSIUM CHLORIDE 20 MEQ/1
20 TABLET, EXTENDED RELEASE ORAL DAILY
Status: CANCELLED | OUTPATIENT
Start: 2022-07-02

## 2022-07-02 RX ORDER — SODIUM CHLORIDE 0.9 % (FLUSH) 0.9 %
10 SYRINGE (ML) INJECTION EVERY 12 HOURS SCHEDULED
Status: DISCONTINUED | OUTPATIENT
Start: 2022-07-02 | End: 2022-07-10 | Stop reason: HOSPADM

## 2022-07-02 RX ORDER — HYDRALAZINE HYDROCHLORIDE 20 MG/ML
10 INJECTION INTRAMUSCULAR; INTRAVENOUS EVERY 6 HOURS PRN
Status: DISCONTINUED | OUTPATIENT
Start: 2022-07-02 | End: 2022-07-02

## 2022-07-02 RX ORDER — LORATADINE 10 MG/1
10 TABLET ORAL DAILY
Status: ON HOLD | COMMUNITY
End: 2022-11-21

## 2022-07-02 RX ORDER — LEVOTHYROXINE SODIUM 0.03 MG/1
25 TABLET ORAL
Status: DISCONTINUED | OUTPATIENT
Start: 2022-07-03 | End: 2022-07-10 | Stop reason: HOSPADM

## 2022-07-02 RX ORDER — LISINOPRIL 20 MG/1
20 TABLET ORAL NIGHTLY
COMMUNITY
End: 2022-07-10 | Stop reason: HOSPADM

## 2022-07-02 RX ORDER — LEVOTHYROXINE SODIUM 0.03 MG/1
25 TABLET ORAL DAILY
Status: CANCELLED | OUTPATIENT
Start: 2022-07-02

## 2022-07-02 RX ADMIN — INSULIN ASPART 2 UNITS: 100 INJECTION, SOLUTION INTRAVENOUS; SUBCUTANEOUS at 08:25

## 2022-07-02 RX ADMIN — FOLIC ACID 1 MG: 1 TABLET ORAL at 08:25

## 2022-07-02 RX ADMIN — ONDANSETRON 4 MG: 2 INJECTION INTRAMUSCULAR; INTRAVENOUS at 15:03

## 2022-07-02 RX ADMIN — ROPINIROLE HYDROCHLORIDE 0.5 MG: 0.25 TABLET, FILM COATED ORAL at 11:23

## 2022-07-02 RX ADMIN — Medication 100 MG: at 08:25

## 2022-07-02 RX ADMIN — ONDANSETRON 4 MG: 2 INJECTION INTRAMUSCULAR; INTRAVENOUS at 21:18

## 2022-07-02 RX ADMIN — Medication 10 ML: at 03:04

## 2022-07-02 RX ADMIN — LEVOTHYROXINE SODIUM 50 MCG: 50 TABLET ORAL at 11:23

## 2022-07-02 RX ADMIN — ATORVASTATIN CALCIUM 80 MG: 40 TABLET, FILM COATED ORAL at 03:04

## 2022-07-02 RX ADMIN — ATORVASTATIN CALCIUM 80 MG: 40 TABLET, FILM COATED ORAL at 21:18

## 2022-07-02 RX ADMIN — CETIRIZINE HYDROCHLORIDE 5 MG: 10 TABLET, FILM COATED ORAL at 21:18

## 2022-07-02 RX ADMIN — HYDRALAZINE HYDROCHLORIDE 10 MG: 20 INJECTION INTRAMUSCULAR; INTRAVENOUS at 03:34

## 2022-07-02 RX ADMIN — Medication 10 ML: at 21:23

## 2022-07-02 RX ADMIN — TIZANIDINE 2 MG: 4 TABLET ORAL at 22:17

## 2022-07-02 RX ADMIN — GABAPENTIN 300 MG: 300 CAPSULE ORAL at 11:23

## 2022-07-02 RX ADMIN — Medication 1 TABLET: at 08:25

## 2022-07-02 RX ADMIN — LORAZEPAM 0.5 MG: 0.5 TABLET ORAL at 22:17

## 2022-07-02 RX ADMIN — PROCHLORPERAZINE EDISYLATE 2.5 MG: 5 INJECTION INTRAMUSCULAR; INTRAVENOUS at 03:35

## 2022-07-02 RX ADMIN — HYDROCODONE BITARTRATE AND ACETAMINOPHEN 1 TABLET: 10; 325 TABLET ORAL at 06:16

## 2022-07-02 RX ADMIN — HYDROCODONE BITARTRATE AND ACETAMINOPHEN 1 TABLET: 10; 325 TABLET ORAL at 15:03

## 2022-07-02 RX ADMIN — PROCHLORPERAZINE EDISYLATE 2.5 MG: 5 INJECTION INTRAMUSCULAR; INTRAVENOUS at 00:01

## 2022-07-02 RX ADMIN — Medication 5 MG/HR: at 11:34

## 2022-07-02 RX ADMIN — ACETAMINOPHEN 650 MG: 325 TABLET ORAL at 11:34

## 2022-07-02 RX ADMIN — HYDROXYZINE HYDROCHLORIDE 25 MG: 25 TABLET ORAL at 03:04

## 2022-07-02 RX ADMIN — ROPINIROLE HYDROCHLORIDE 0.5 MG: 0.25 TABLET, FILM COATED ORAL at 21:18

## 2022-07-02 RX ADMIN — HYDROXYZINE HYDROCHLORIDE 25 MG: 25 TABLET ORAL at 21:18

## 2022-07-02 RX ADMIN — Medication 10 ML: at 08:25

## 2022-07-02 RX ADMIN — PANTOPRAZOLE SODIUM 40 MG: 40 TABLET, DELAYED RELEASE ORAL at 06:16

## 2022-07-02 RX ADMIN — HYDROXYZINE HYDROCHLORIDE 25 MG: 25 TABLET ORAL at 11:34

## 2022-07-02 NOTE — PROGRESS NOTES
Teleneurology Stroke Progress Note    Date of follow up: 7/2/2022  Attending Physician: Ari Rosa*  Current Hospital day: Hospital Day: 2  Location: Floor  Date/Time Telestroke doctor on video: 7/2/2022  0855 ET    Impression: Reny Elena is a 63 y/o right-handed woman with HTN, DM, chronic diastolic dysfunction, ESRD on PD, hypothyroidism, and recent diagnosis of anterior falcine SDH during prior admission in 5/2022 who presented to the [facility] ED on 7/1 for further evaluation and management of persistent nausea and emesis over two days prior to presentation. Additional note of significant unintentional weight loss associated with chills and night sweats per review of initial Neurology consultation.   - Initial imaging evaluation with NCCT head personally reviewed with no evidence for hemorrhage, hyperdense artery, or large evolving cortical infarction. Notable for an age-indeterminate area of lacunar infarction in the left thalamus and a moderate to severe degree of confluent WM disease that is most pronounced posteriorly in the left hemisphere. Interval marked improvement vs resolution of parafalcine SDH.   - Subsequent brain MRI w/o contrast demonstrated acute-appearing focus of diffusion in the left cerebellar hemisphere and less intense diffusion signal in the left frontal WM, both associated with weak ADC correlate, consistent with late acute to subacute ischemic stroke. No evidence for hemorrhagic complication. FLAIR sequence confirm a moderate to severe degree of confluent periventricular WM disease with prominent tacking along the lateral ventricles, which may be seen with transependymal flow vs variant radiographic presentation of SVID.   - Patient with cryptogenic embolic stroke, unintentional weight loss, fatigue, and persistent nausea is concerning for the possibility of occult malignancy. In addition to standard initial stroke workup, patient may benefit from CSF evaluation to r/o a  leptomeningeal process c/t symptoms, as post-contrast MRI evaluation cannot be completed at this time.     Recommendations:   - Continue neuro assessments and vital signs Q4 hrs with continuous telemetry. Please notify on-call Neurology with any abrupt change in exam.   - Would benefit from LP with OP and CSF evaluation to r/o leptomeningeal neoplastic process c/t symptoms. Send routine studies with cell count and diff (tubes 1 and 4), protein, glucose, gram stain, cultures, M/E PCR panel, cytology, and flow cytometry.   - Recommend TOF (no contrast) MRA evaluation of the head and neck vessel for further etiologic evaluation of stroke, r/o mycotic aneurysm.   - Would also benefit from MRI of the cervical spine w/o contrast to r/o myelopathy contributing to frequent falls, r/o contusion.   - Permissive HTN to SBP < 180 mmHg x additional 24-48 hrs, provided no contraindications to permissive HTN (severe CHF, CAD, etc).   - Following initial period of permissive HTN, slow titration of oral antihypertensive medications to goal BP < 130/80 mmHg as tolerated.   - Holding ASA until TTE and blood cultures resulted to r/o possible contributions from embolic stroke due to IE.   - Continue atorvastatin 80 mg PO QHS for the secondary prevention of stroke.   - Stroke labs: Hgb A1c 7.60% (may be falsely lowered in patients with ESRD, increased RBC turnover) /  / TSH 7.680 / ESR 8 / . Please send procalcitonin, free T4 / Vit B12 / folate / Vit D level / iron panel. Blood cultures sent, pending (7/2).   - Transthoracic echo pending. Recommend agitated saline study. Evaluation for valvular vegetations.    - Continued surveillance for occult malignancy as per primary team, PCP.   - Delirium precautions: Lights on, shades open from 0700 to 1900 daily with frequent reorientation. Typical risk factors for delirium include advanced age, premorbid neurological disease, significant impairment in hearing or vision, critical illness,  or prolonged hospital admission.   - Continued PT/OT/SLP evaluation for any post-discharge rehab needs.      Thank you for the opportunity to participate in the care of this patient. We will continue to follow along with you.     If you have any questions about the patient recommendations, please call [phone] at anytime and ask to speak with the neurologist on call. Press 1 for an emergent consult and 2 for a non-emergent consult    Olivia Badillo MD      Interval History:   HD #2. Patient received PRN IV hypertensive medications overnight for SBPs in 230s. Continues to have some mild HA and nausea (intermittent emesis with dark colored sputum). She reports that headache has been going on for several days with n/v for at least two months w/o clear etiology. She has had significant fatigue and worsening tremor for > 3 months. Reports falling and hitting her head on tub with LOC on Wednesday, found by SO.   - SBPs labile since midnight in 130s-200s with HR 90s-110s. Last temp 97.8F at 1633 on 7/1.   - Review of recent labs remarkable for WBC 6.62 with Hgb 11.2, MCV 88.6, and PLT 152K. BUN 42, Cr 3.78. Na 142. Mag 1.5 -> 1.6 today. ESR and CRP WNL.     Medication:   Current Facility-Administered Medications   Medication Dose Route Frequency Provider Last Rate Last Admin   • acetaminophen (TYLENOL) tablet 650 mg  650 mg Oral Q6H PRN Marta Caceres PA       • atorvastatin (LIPITOR) tablet 80 mg  80 mg Oral Nightly Berenice Hicks, DO   80 mg at 07/02/22 0304   • dextrose (D50W) (25 g/50 mL) IV injection 25 g  25 g Intravenous Q15 Min PRN Berenice Hicks, DO       • dextrose (GLUTOSE) oral gel 15 g  15 g Oral Q15 Min PRN Berenice Hicks, DO       • folic acid (FOLVITE) tablet 1 mg  1 mg Oral Daily Berenice Hicks, DO       • glucagon (human recombinant) (GLUCAGEN DIAGNOSTIC) injection 1 mg  1 mg Intramuscular Q15 Min PRN Berenice Hicks, DO       • hydrALAZINE (APRESOLINE)  "injection 10 mg  10 mg Intravenous Q6H PRN ENRICO'Marta Qiu PA   10 mg at 07/02/22 0334   • HYDROcodone-acetaminophen (NORCO)  MG per tablet 1 tablet  1 tablet Oral Q8H PRN Berenice Hicks DO   1 tablet at 07/02/22 0616   • hydrOXYzine (ATARAX) tablet 25 mg  25 mg Oral TID PRN Berenice Hicks DO   25 mg at 07/02/22 0304   • Insulin Aspart (novoLOG) injection 0-7 Units  0-7 Units Subcutaneous TID AC Berenice Hicks DO       • multivitamin (THERAGRAN) tablet 1 tablet  1 tablet Oral Daily ENRICO'Marta Qiu PA       • nitroglycerin (NITROSTAT) SL tablet 0.4 mg  0.4 mg Sublingual Q5 Min PRN ENRICO'Marta Qiu PA       • pantoprazole (PROTONIX) EC tablet 40 mg  40 mg Oral Q AM ENRICO'Marta Qiu PA   40 mg at 07/02/22 0616   • prochlorperazine (COMPAZINE) injection 2.5 mg  2.5 mg Intravenous Q8H PRN Berenice Hicks DO       • sodium chloride 0.9 % flush 10 mL  10 mL Intravenous PRN Berenice Hicks DO       • sodium chloride 0.9 % flush 10 mL  10 mL Intravenous Q12H Berenice Hicks DO   10 mL at 07/02/22 0304   • sodium chloride 0.9 % flush 10 mL  10 mL Intravenous PRN Berenice Hicks DO       • sodium chloride 0.9 % flush 10 mL  10 mL Intravenous PRN Ari Rosa DO       • thiamine (VITAMIN B-1) tablet 100 mg  100 mg Oral Daily Berenice Hicks DO           NIH stroke scale:        1a Level of consciousness 0  1b LOC questions 0  1c LOC commands 0  2 Best Gaze 0  3 Visual 0  4 Facial Palsy 0  5a Motor left arm 0  5b Motor right arm 0  6a Motor left leg 0  6b Motor right leg 0  7 Limb ataxia 0  8 Sensory 0  9 Best language 0  10 Dysarthria 1 (edentulous vs true dysarthria)  11 Extinction and inattention 0   Total 1    Neurological Exam:      /73   Pulse 99   Temp 97.8 °F (36.6 °C) (Tympanic)   Resp 18   Ht 165.1 cm (65\")   Wt 44.4 kg (97 lb 12.8 oz)   LMP 05/26/2005   SpO2 94%   BMI 16.27 kg/m²  BMI: [unfilled]    General: No acute distress. " Semi-upright in bed at the time of evaluation.   Pulm: Normal work of breathing.     Neuro   - Mental Status: Awake, alert. Oriented to self, age, , month, hospital, and reason for admission.    - Speech: Speech fluent, equivocal dysarthria (edentulous). Follows simple commands without visual cues. Using stroke cards, patient is able to read phrases, name objects (6/6 items correct), and describe cookie theft scene without significant difficulty. Repeats a complex sentence with minor errors.    - CN: EOMI. Visual fields full to confrontation. Sensation intact to LT / temp in V1-V3 distribution. Asymmetric face w/o weakness on activation. Tongue midline.   - Motor: Diffusely decreased bulk. There is antigravity effort w/o drift bilaterally. Strength is grossly normal, save trace weakness in LLE due to pain at knee. There is a prominent postural tremor of LUE > RUE, involves BLE to a lesser degree. Occasional myoclonic jerks.  - Sensory: Reports symmetric sensation to LT bilaterally.   - Coordination: Adequate FTN bilaterally. No definite dysmetria.       Results:  Lab Results   Component Value Date    CREATININE 3.78 (H) 2022     Lab Results   Component Value Date    WBC 6.62 2022     No components found for: INR;3,  PROTIME   Lab Results   Component Value Date     (H) 2022      Lab Results   Component Value Date    HGBA1C 7.60 (H) 2022          Teleneurology Patient Verification & Consent    I have proceeded with this evaluation at the direct request of the referring practitioner as there is felt to be no appropriate specialist available at bedside to perform these evaluations. The originating [facility] practitioner has reported to me this is the correct patient and has obtained verbal consent from the patient/surrogate to perform his voluntary telemedicine evaluation (including obtaining history, performing examination and reviewing data provided by the  patient and/or originating site care provider). I attest that I introduced myself to the patient, provided my credentials, disclosed my location, and determined that, based on review of the patient's chart and discussion with the patient's primary team, telemedicine via a HIPAA-compliant, real-time, face-to-face, two-way, interactive audio and video platform is an appropriate and effective means of providing this service.  The patient/surrogate has the right to refuse this evaluation as they have been explained risks (including potential loss of confidentiality), benefits, alternatives, and the potential need for subsequent face to face care. Patient/surrogate understands that there is a risk of medical inaccuracies given that our recommendations will be made based on reported data (and we must therefore assume this information is accurate). Knowing that there is a risk that this information is not reported accurately, and that the telemedicine video, audio, or data feed may be incomplete, the patient agrees to proceed with evaluation and holds us harmless knowing these risks. In this evaluation, we will be providing recommendations only. The ultimate decision to follow, or not follow, these recommendations will be left to the bedside treating/requesting practitioner. The patient/surrogate has been notified that other healthcare professionals (including technical personnel) may be involved in this audio-video evaluation. All laws concerning confidentiality and patient access to medical records and copies of medical records apply to telemedicine. The patient/surrogate has received the originating site's Health Notice of Privacy Practices.    Medical Data Reviewed:   1. Data reviewed include clinical labs, radiology, medical test;  2. Obtaining/reviewing old medical records;  3. Obtaining case history from another source;  4. Independent review of CNS images    Olivia POPE MD, saw patient on 7/2/2022 for  an initial in-patient or emergency room telemedicine face-to-face consult using interactive technology for 20 minutes. The location of the patient was at [facility]. My location was [place], [place]. I was assisted with the exam by RN.

## 2022-07-02 NOTE — CONSULTS
[facility]   Teleneurology Note    Patient Name: Reny Elena  : 1960  MRN: [id number]  Primary Care Physician: Susan Stephens APRN  Referring Site: [place]  Referring Provider: Venkata NARVAEZ    Subjective   Teleneurology Initial Data           Neurologist Evaluation Date: 22               Stroke Risk Factors/ Pertinent Data     Stroke risk factors: diabetes, prior stroke/ TIA  Anticoagulants prior to arrival: none     Pre- Stroke Modified [place] Scale     Pre-Stroke Modified [place] Scale: 3 - Moderate disability.  Requiring some help, but able to walk without assistance.    NIH Stroke Scale           Interval: baseline  1a. Level of Consciousness: 0-->Alert, keenly responsive  1b. LOC Questions: 0-->Answers both questions correctly  1c. LOC Commands: 0-->Performs both tasks correctly  2. Best Gaze: 0-->Normal  3. Visual: 0-->No visual loss  4. Facial Palsy: 0-->Normal symmetrical movements  5a. Motor Arm, Left: 0-->No drift, limb holds 90 (or 45) degrees for full 10 secs  5b. Motor Arm, Right: 0-->No drift, limb holds 90 (or 45) degrees for full 10 secs  6a. Motor Leg, Left: 0-->No drift, leg holds 30 degree position for full 5 secs  6b. Motor Leg, Right: 0-->No drift, leg holds 30 degree position for full 5 secs  7. Limb Ataxia: 0-->Absent  8. Sensory: 0-->Normal, no sensory loss  9. Best Language: 0-->No aphasia, normal  10. Dysarthria: 1-->Mild-to-moderate dysarthria, patient slurs at least some words and, at worst, can be understood with some difficulty  11. Extinction and Inattention (formerly Neglect): 0-->No abnormality  Total (NIH Stroke Scale): 1     Objective     Result Review    Results     Personal review of CNS imaging:(Official report by radiologist pending)  Imaging  CT Imaging Review: CT Imaging reviewed, NO acute infarct/ hemorrhage seen    Thrombolytic   Thrombolytics: tPA not given  Tissue Plasminogen Activator (tPA) Exclusion Criteria: Onset unknown or GREATER than 4.5 hours  "    Assessment & Plan   Assessment/ Plan     Assessment:  Acute Stroke Evaluation: Suspected ACUTE ischemic stroke     Ms. Reny Elena is a 63 yo woman with ESRD on peritoneal dialysis at home, insulin dependent type II DM, HTN, hypothyroidism, chronic diastolic dysfunction, PAD, arthritis and depression, recent spontaneous subdural hematoma presenting with nausea.    Ms. Elena presents alone. She provides her own history.     Ms. Elena states she had her boyfriend bring her in today because of persistent nausea and vomiting over the last 2 days. She reports this is an acute on chronic problem that started months ago. She reports 50lb weight loss over the last year due to these symptoms. She has had multiple hospital admissions over the last year. She denies fevers, no diarrhea, no cough, +chills, +night sweats. She had recent body imaging (March CTchest, today CT abdomen) without pathology concerning for malignancy. She is scheduled for an outpatient upper GI evaluation. She reports generalized fatigue and weakness. No acute onset unilateral weakness, sensory loss, difficulty with speech or comprehension. She reports a lifetime of \"walking difficulty because of my knee problems.\" No assistive devices. No falls.     On exam she is sleepy and falls asleep twice during our interview  She awakens easily to voice   She requires frequent repeating of questions and commands during our interview  She is oriented to 2022, self and place   Able to do simple calculations   Her naming in intact to high frequency objects  Repetition intact  She has high amplitude low frequency jerks in her upper extremities   Her face is symmetric   Her extraocular eye movements are intact  She reports left monocular blurry vision but no field cut   She has myoclonic jerks but no drift in her upper extremities  She holds her lower extremities antigravity for 5 seconds  Her finger-nose-finger is challenging due to jerks  Gait was deferred. "     Labs  WBC 4.4  plts 131   Cr 3.38     Mri brain  1.  A few small patchy foci of acute versus early subacute infarct involving the left frontal lobe and left cerebellar hemisphere.  2.  Previously seen subdural hematomas no longer visualized.  3.  Extensive presumed chronic microvascular ischemic changes with multiple old lacunar infarcts.    #Acute and subacute multifocal punctate embolic appearing strokes in multiple vascular territories   C/f centroembolic source vs hypercoagable state. Given multifocal and subacute and acute infarcts, severe weight loss, chills and night sweats would evaluate for infectious sources such as endocarditis and malignancy.    - [ ] Will defer antiplatelet medications for today given recent spontaneous subdural hematoma and mild thrombocytopenia to 131 and consideration of endocarditis.   - [ ] Vessel imaging of the head and neck   - [ ] Assure malignancy screening up to date given rapid weight loss over the last few months  - [ ] Echocardiogram with bubble, consider RAMANDEEP given slow decline, weight loss, fatigue, evaluating closely for endocarditis    - [ ] blood cultures  - [ ] check electrolytes, b12, folate and nutritional labs. Monitor for refeeding syndrome   - [ ] empiric thiamine and folate   - [ ] Atorvastatin 80mg    - [x] MRI brain with and without contrast   - [ ] Telemetry   - [ ] Stroke labs - lipids, hgbA1c, ESR/CRP  - [ ] 28 day holter monitor at discharge   - [ ] PT and OT evaluation   - [ ] hx of difficult to manage blood pressure that is highly variable and hx of hypertensive emergency. On presentation 230s. Would aim for <200 in the first 24hrs.      Donya Jaramillo MD  We will continue to follow   If you have any questions about the patient recommendations,   please call [phone] at anytime and ask to speak with the neurologist on call.   Press 1 for an emergent consult and 2 for a non-emergent consult.         Disposition     Disposition: The patient  will remain at the referring institution for further evaluation and management    Medical Decision Making  Medical Data Reviewed: Data reviewed including: clinical labs, radiology and/or medical tests  Length of visit: 60 minutes    I, Donya Jaramillo MD, saw the patient on 07/01/22 at   for an initial in-patient or emergency room telememedicine face to face consult using interactive technology for 60 minutes. The location of the patient was [place]. I was located at  . I was assisted with the exam by  .    I have proceeded with this evaluation at the request of the referring practitioner as it is felt to be an emergency setting and no appropriate specialist is available to perform this evaluation. The originating hospital has reported that this is the correct patient and has obtained consent from the patient/surrogate to perform this telemedicine evaluation(including obtaining history, performing examination and reviewing data provided by the patient an/or originating site of care provider)    I have introduced myself to the patient, provided my credentials, disclosed my location, and determined that, based on review of the patient's chart and discussion with the patient's primary team, telemedicine via a HIPAA compliant, real-time, face-to-face two-way, interactive audio and video platform is an appropriate and effective means of providing the service.    The patient/surrogate has a right to refuse this evaluation as they have been explained risks including potential loss of confidentiality, benefits, alternatives, and the potential need for subsequent face-to-face care. In this evaluation, we will be providing recommendations only.  The ultimate decision to follow or not to follow these recommendations will be left to the bedside treating/requesting practitioner.    The patient/surrogate has been notified that other healthcare professionals including technical person may be involved in this A/V evaluation.   All laws concerning confidentiality and patient access to medical records and copies of medical records apply to telemedicine.  The patient/surrogate has received the originating site's Health Notice of Privacy Practices.    Donya Jaramillo MD

## 2022-07-02 NOTE — PAYOR COMM NOTE
"[facility]  [facility]  PHONE [phone]  FAX  [phone]  NPI:  [id number]    REQUEST FOR INPATIENT AUTH    Reny Merino (62 y.o. Female)             Date of Birth   1960    Social Security Number       Address   [address] [place] [address]    Home Phone   [phone]    MRN   [id number]       Oriental orthodox   Zoroastrian    Marital Status                               Admission Date   7/1/22    Admission Type   Emergency    Admitting Provider   Berenice Hicks DO    Attending Provider   Ari Rosa DO    Department, Room/Bed   [facility] PROGRESS CARE, P219/1P       Discharge Date       Discharge Disposition       Discharge Destination                               Attending Provider: Ari Rosa DO    Allergies: Penicillins, Codeine, Sulfa Antibiotics    Isolation: None   Infection: COVID (rule out) (07/01/22)   Code Status: CPR   Advance Care Planning Activity    Ht: 165.1 cm (65\")   Wt: 44.4 kg (97 lb 12.8 oz)    Admission Cmt: None   Principal Problem: None                Active Insurance as of 7/1/2022     Primary Coverage     Payor Plan Insurance Group Employer/Plan Group    [employer] [place] [employer] MEDICA[place]      Payor Plan Address Payor Plan Phone Number Payor Plan Fax Number Effective Dates    [address] [address] [phone]  4/12/2017 - None Entered    [facility] [id number]       Subscriber Name Subscriber Birth Date Member ID       RENY MERINO 1960 [id number]                 Emergency Contacts      (Rel.) Home Phone Work Phone Mobile Phone    Cliff Leiva (Significant Other) [phone] -- [phone]    Liza Oliva (Daughter) [phone] -- [phone]               History & Physical      O'Marta Qiu PA at 07/02/22 0051     Attestation signed by Berenice Hicks DO at 07/02/22 0659    I have reviewed this documentation and agree. I have also seen and examined the patient at bedside. Patient " noted to have vomitus at bedside. Patient complained of some nausea; no vomiting. She does report some weakness in the left leg but relays it to her left knee/OA. No slurred speech/dysarthria. Reports BP usually controlled at home when checked. States N/V worse over the past 3 days or so.     On exam, patient's heart is tachycardic rate in the low 100s/1 teens but regular rhythm without obvious murmur.  Lungs are clear to auscultation anteriorly.  Patient with equal handgrip and lower extremity strength.  No significant myoclonic jerking of upper extremities noted on my exam.  No significant tremor noted at this time.    -Acute and subacute multifocal punctate embolic appearing strokes in multiple vascular territories, POA  -Hypertensive urgency, resolved  -ESRD on PD  -Recent spontaneous subdural hematoma, resolved on imaging  -Nausea and vomiting; possibly multifactorial; may be related to gastroparesis in the setting of diabetes mellitus  -Prolonged QTC, 568 ms  -Recent unintentional weight loss  -Malnutrition with BMI 16.27 kg/m²    Continue with permissive hypertension for now.  Appreciate neurology evaluation and recommendations.  TTE ordered; patient will likely require cardiology consult for RAMANDEEP.  Nephrology has been consulted to assist with patient's peritoneal dialysis.  Avoid further QT prolonging agents; currently holding patient's trazodone.  Continue to monitor on telemetry.  Obtain magnesium level; potassium is within normal limits.  Low-dose IV Compazine as needed for further nausea and/or vomiting.  Neurology is recommending a 28-day Holter monitor at the time of discharge.                       [facility] Medicine Services  HISTORY & PHYSICAL    Patient Identification:  Name:  Reny Elena  Age:  62 y.o.  Sex:  female  :  1960  MRN:  [id number]   Visit Number:  [id number]  Admit Date: 2022   Primary Care Physician:  Susan Stephens APRN     Subjective     Chief  complaint:   Chief Complaint   Patient presents with   • Chest Pain   • Vomiting     History of presenting illness:   Patient is a 62 y.o. female with past medical history significant for ESRD on peritoneal dialysis, HFpEF, essential hypertension, hyperlipidemia, PAD, insulin dependent T2DM, GERD, and anxiety that presented to the [facility] emergency department for evaluation of persistent nausea and vomiting. Patient states that she has had recurrent/persistent nausea and vomiting for the past month. She states that for the past three days her symptoms have been acutely worsened. She reports very little oral intake due to N/V. She denies any abdominal pain or change in BMs. She denies any fevers but does reports chills. She reports night sweats. She reports unintentional weight loss of approximately 50 lbs in the past year. She denies history of colonoscopy/EGD, but reports she is scheduled for outpatient upper GI study soon. She denies any ill contacts with similar symptoms. She denies eating undercooked foods or spoiled foods. She does report ongoing fatigue and generalized weakness. She reports having a fall on Wednesday where she tripped and fell. She reports hitting the back of her head and loosing consciousness after fall. She denies any further episodes, no syncope, no headaches or visual changes. Denies any lateralizing weakness or focal weakness. Denies any numbness, tingling or paraesthesias. She does report intermittent jerking of her upper extremities that has been present for the past month, she denies seeing medical provider for this. She reports chronic back pain from surgery in past, no acute worsening, no bowel/bladder dysfunction. Patient does state that yesterday she had a brief episode of chest pain for which she describes as tightness across her chest without radiation. She states it did not last very long and resolved without intervention. She denies any further chest pain  episodes. Denies any dyspnea. No cough or upper respiratory complaints.      Upon arrival to the ED, vitals were temperature 97.8, heart rate 90, respiratory rate 16, blood pressure 218/110, and oxygen saturation 96% on room air.  Initial troponin T 0.085 with repeat at 0.081.  ABG with pH 7.460, PCO2 39.6, PO2 75.3, HCO3 28.1, and oxygen saturation 96.4% on room air.  CMP with glucose 206, creatinine 3.38, BUN 37, EGFR 14.6, and albumin 3.41.  Blood acetone negative.  Lipase negative.  CBC with hemoglobin 11.8, platelets 131,000, otherwise grossly unremarkable.  COVID-19 and influenza screening negative.  Chest x-ray with no evidence of acute cardiopulmonary disease.  CT abdomen pelvis without contrast with small volume ascites or dialysate, patient has percutaneous dialysis catheter in place.  There is no evidence of obstruction.  CT head without contrast with atrophy and chronic microangiopathic changes, no parenchymal mass/hemorrhage/midline shift appreciated.  MRI brain without contrast with a few small patchy foci of acute versus early subacute infarct involving the left frontal lobe and left cerebral hemisphere.  Previously seen subdural hematomas no longer visualized.  There is extensive presumed chronic microvascular ischemic changes with multiple old lacunar infarcts noted.  Known ED medication administration: 10 mg IV hydralazine x1, 20 mg IV hydralazine x1, Compazine 2.5 mg IV x2.    Patient has been admitted to the progressive care unit for further evaluation and treatment.     Present during exam: N/A   ---------------------------------------------------------------------------------------------------------------------   Review of Systems   Constitutional: Positive for activity change, appetite change, chills, diaphoresis (Night sweats ), fatigue and unexpected weight change (50 lbs in 1 year ). Negative for fever.   HENT: Negative for congestion, sore throat and trouble swallowing.    Eyes: Negative  for discharge and visual disturbance.   Respiratory: Negative for cough, shortness of breath and wheezing.    Cardiovascular: Positive for chest pain (Brief solitary episode). Negative for palpitations and leg swelling.   Gastrointestinal: Positive for nausea and vomiting. Negative for abdominal pain, constipation and diarrhea.   Genitourinary: Negative for decreased urine volume, dysuria, frequency and urgency.   Musculoskeletal: Positive for back pain (Chronic, unchanged from baseline ) and gait problem (Chronic, reports her knee dislocates frequently ). Negative for arthralgias and myalgias.   Skin: Negative for color change and wound.   Neurological: Positive for tremors (Intermittent myoclonic jerking) and weakness. Negative for dizziness, syncope, light-headedness and headaches.   Psychiatric/Behavioral: Negative for confusion. The patient is not nervous/anxious.       ---------------------------------------------------------------------------------------------------------------------   Past Medical History:   Diagnosis Date   • Arthritis    • Closed fracture of right fibula and tibia 2018   • Depression    • Diabetic ulcer of left foot associated with type 2 diabetes mellitus ([facility]) 2017   • Diastolic dysfunction    • Disease of thyroid gland    • Elevated cholesterol    • End stage renal disease ([facility])    • Essential hypertension    • GERD (gastroesophageal reflux disease)    • History of transfusion    • Hyperlipidemia    • Iron deficiency anemia 2018   • PAD (peripheral artery disease) ([facility])    • Renal failure    • Type 2 diabetes mellitus with hyperglycemia, with long-term current use of insulin ([facility]) 2018     Past Surgical History:   Procedure Laterality Date   • ABDOMINAL SURGERY     • BACK SURGERY      Post spinal diskectomy, osteophytectomy in one lumbar interspace   • CATARACT EXTRACTION      Left    •  SECTION     • DENTAL PROCEDURE     • ENDOSCOPY     • FRACTURE SURGERY       right leg   • HYSTERECTOMY     • INCISION AND DRAINAGE LEG Left 4/15/2017    Procedure: INCISION AND DRAINAGE LOWER EXTREMITY;  Surgeon: Basilio Morris MD;  Location: [facility] OR;  Service:    • INCISION AND DRAINAGE LEG Left 5/26/2017    Procedure: Irrigation and Debridment abcess diabetic wound left foot with deep culture;  Surgeon: Basilio Morris MD;  Location: [facility] OR;  Service:    • INSERTION PERITONEAL DIALYSIS CATHETER N/A 12/16/2021    Procedure: INSERTION PERITONEAL DIALYSIS CATHETER LAPAROSCOPIC;  Surgeon: Edy Glover MD;  Location: [facility] OR;  Service: General;  Laterality: N/A;   • KNEE ARTHROSCOPY Right    • ORIF TIBIA FRACTURE Right 12/28/2018    Procedure: TIBIAL  FRACTURE OPEN REDUCTION INTERNAL FIXATION;  Surgeon: Jung Barragan MD;  Location: [facility] OR;  Service: Orthopedics   • TOE AMPUTATION Right     5th   • TUBAL ABDOMINAL LIGATION       Family History   Problem Relation Age of Onset   • Heart disease Mother    • Cancer Mother    • COPD Mother    • Diabetes Other    • Depression Other      Social History     Socioeconomic History   • Marital status:    Tobacco Use   • Smoking status: Never Smoker   • Smokeless tobacco: Never Used   Vaping Use   • Vaping Use: Never used   Substance and Sexual Activity   • Alcohol use: No   • Drug use: No   • Sexual activity: Defer     ---------------------------------------------------------------------------------------------------------------------   Allergies:  Penicillins, Codeine, and Sulfa antibiotics  ---------------------------------------------------------------------------------------------------------------------   Medications below are reported home medications pulling from within the system; at this time, these medications have not been reconciled unless otherwise specified and are in the verification process for further verifcation as current home medications.    Prior to Admission Medications     Prescriptions Last Dose  Informant Patient Reported? Taking?    calcitriol (ROCALTROL) 0.25 MCG capsule  Pharmacy Yes No    Take 0.25 mcg by mouth Daily.    carvedilol (COREG) 12.5 MG tablet   No No    Take 1 tablet by mouth 2 (Two) Times a Day With Meals.    cetirizine (zyrTEC) 10 MG tablet  Pharmacy Yes No    Take 10 mg by mouth Daily.    cloNIDine (CATAPRES) 0.1 MG tablet  Pharmacy Yes No    Take 0.1 mg by mouth 3 (Three) Times a Day.    clopidogrel (PLAVIX) 75 MG tablet  Pharmacy No No    Take 1 tablet by mouth Daily.    docusate sodium (COLACE) 100 MG capsule  Pharmacy Yes No    Take 100 mg by mouth 2 (Two) Times a Day.    fluticasone (FLONASE) 50 MCG/ACT nasal spray  Pharmacy Yes No    2 sprays into the nostril(s) as directed by provider Daily As Needed for Allergies.    gabapentin (NEURONTIN) 300 MG capsule  Pharmacy Yes No    Take 300 mg by mouth Daily.    glucose blood test strip   No No    Use to test blood glucose up to four times daily as needed. Diagnosis: Type 2 Diabetes - Insulin Dependent    HYDROcodone-acetaminophen (NORCO)  MG per tablet   Yes No    Take 1 tablet by mouth 3 (Three) Times a Day As Needed for Moderate Pain .    hydrOXYzine (ATARAX) 25 MG tablet  Pharmacy No No    Take 1 tablet by mouth 3 (Three) Times a Day As Needed for Anxiety.    insulin aspart (novoLOG) 100 UNIT/ML injection  Pharmacy Yes No    Inject 3 Units under the skin into the appropriate area as directed 3 (Three) Times a Day Before Meals.    insulin glargine (LANTUS, SEMGLEE) 100 UNIT/ML injection  Pharmacy Yes No    Inject 10 Units under the skin into the appropriate area as directed Every Night.    isosorbide mononitrate (IMDUR) 30 MG 24 hr tablet   No No    Take 1 tablet by mouth Daily.    levothyroxine (SYNTHROID, LEVOTHROID) 50 MCG tablet  Pharmacy Yes No    Take 50 mcg by mouth Every Morning.    lisinopril (PRINIVIL,ZESTRIL) 20 MG tablet   No No    Take 1 tablet by mouth at bedtime.    pantoprazole (PROTONIX) 40 MG EC tablet  Pharmacy  Yes No    Take 40 mg by mouth Daily.    pravastatin (PRAVACHOL) 20 MG tablet  Pharmacy Yes No    Take 20 mg by mouth Daily.    prochlorperazine (COMPAZINE) 10 MG tablet  Pharmacy Yes No    Take 5 mg by mouth Every 8 (Eight) Hours As Needed for Nausea or Vomiting.    rOPINIRole (REQUIP) 0.5 MG tablet  Pharmacy Yes No    Take 0.5 mg by mouth 2 (Two) Times a Day.    tiZANidine (ZANAFLEX) 4 MG tablet  Pharmacy Yes No    Take 4 mg by mouth Every 6 (Six) Hours As Needed for Muscle Spasms.    traZODone (DESYREL) 100 MG tablet  Pharmacy No No    Take 1 tablet by mouth Every Night.        ---------------------------------------------------------------------------------------------------------------------    Objective     Hospital Scheduled Meds:        Current listed hospital scheduled medications may not yet reflect those currently placed in orders that are signed and held, awaiting patient's arrival to floor/unit.    ---------------------------------------------------------------------------------------------------------------------   Vital Signs:  Temp:  [97.8 °F (36.6 °C)] 97.8 °F (36.6 °C)  Heart Rate:  [] 103  Resp:  [16] 16  BP: (165-239)/() 165/94  Mean Arterial Pressure (Non-Invasive) for the past 24 hrs (Last 3 readings):   Noninvasive MAP (mmHg)   07/02/22 0032 131   07/01/22 2332 154   07/01/22 1942 108     SpO2 Percentage    07/01/22 1942 07/01/22 2332 07/02/22 0032   SpO2: 97% 98% 94%     SpO2:  [94 %-99 %] 94 %  on   ;   Device (Oxygen Therapy): room air    Body mass index is 16.81 kg/m².  Wt Readings from Last 3 Encounters:   07/01/22 45.8 kg (101 lb)   05/19/22 52.6 kg (115 lb 14.4 oz)   05/05/22 50.3 kg (111 lb)       ---------------------------------------------------------------------------------------------------------------------   Physical Exam:  Physical Exam  Nursing note reviewed.   Constitutional:       General: She is awake. She is not in acute distress.     Appearance: She is  well-developed and underweight. She is ill-appearing (Chronically ). She is not toxic-appearing.      Comments: Sitting up in bed. On room air. No acute distress noted. No family present at bedside.   HENT:      Head: Normocephalic and atraumatic.      Mouth/Throat:      Mouth: Mucous membranes are moist.   Eyes:      General: No visual field deficit.     Extraocular Movements: Extraocular movements intact.      Right eye: Normal extraocular motion and no nystagmus.      Left eye: Normal extraocular motion and no nystagmus.      Conjunctiva/sclera: Conjunctivae normal.      Pupils: Pupils are equal, round, and reactive to light.   Cardiovascular:      Rate and Rhythm: Regular rhythm. Tachycardia present.      Pulses:           Dorsalis pedis pulses are 2+ on the right side and 2+ on the left side.      Heart sounds: Normal heart sounds. No murmur heard.    No friction rub. No gallop.   Pulmonary:      Effort: Pulmonary effort is normal. No tachypnea, accessory muscle usage or respiratory distress.      Breath sounds: Normal breath sounds and air entry. No wheezing, rhonchi or rales.      Comments: On room air. Speaks in full sentences without dyspnea.  Chest:      Chest wall: No tenderness.   Abdominal:      General: Bowel sounds are normal. There is no distension.      Palpations: Abdomen is soft.      Tenderness: There is no abdominal tenderness. There is no guarding or rebound.   Genitourinary:     Comments: No tate catheter in place.  Musculoskeletal:      Cervical back: Neck supple.      Right lower leg: No edema.      Left lower leg: No edema.   Skin:     General: Skin is warm and dry.      Capillary Refill: Capillary refill takes less than 2 seconds.   Neurological:      General: No focal deficit present.      Mental Status: She is alert and oriented to person, place, and time.      GCS: GCS eye subscore is 4. GCS verbal subscore is 5. GCS motor subscore is 6.      Cranial Nerves: Cranial nerves are intact.  No dysarthria or facial asymmetry.      Sensory: Sensation is intact.      Motor: Weakness (Generalized, no lateralizing or focal weakness appreciated) and tremor (Frequent intermittent myoclonic jerks of upper extremities) present. No pronator drift.      Coordination: Finger-Nose-Finger Test abnormal (Complicated 2/2 frequent myoclonic jerks of upper extremities). Heel to Currie Test normal.      Comments: Awake and alert. Follows commands. Answers questions appropriately. Moves all extremities equally. Strength and sensation grossly intact. No focal neuro deficit on exam.   Psychiatric:         Attention and Perception: Attention normal.         Mood and Affect: Mood and affect normal.         Speech: Speech normal.         Behavior: Behavior is cooperative.         Cognition and Memory: Cognition and memory normal.       ---------------------------------------------------------------------------------------------------------------------  EKG:  Pending cardiology read. Per my interpretation: Sinus tachycardia,  BPM, QTc prolonged at 568 ms/, non specific ST abnormality noted. Await final cardiology read.     Pending cardiology read. Per my interpretation: NSR with HR 93 BPM, QTc prolonged at 502 ms, non specific ST abnormality. Await final cardiology read.       Telemetry:    Sinus tachycardia 100s    I have personally reviewed the EKG/Telemetry strip  ---------------------------------------------------------------------------------------------------------------------   Results from last 7 days   Lab Units 07/01/22  1931 07/01/22  1727   TROPONIN T ng/mL 0.081* 0.085*         Results from last 7 days   Lab Units 07/01/22  1722   PH, ARTERIAL pH units 7.460*   PO2 ART mm Hg 75.3*   PCO2, ARTERIAL mm Hg 39.6   HCO3 ART mmol/L 28.1*     Results from last 7 days   Lab Units 07/01/22  1727   WBC 10*3/mm3 4.43   HEMOGLOBIN g/dL 11.8*   HEMATOCRIT % 35.8   MCV fL 89.5   MCHC g/dL 33.0   PLATELETS 10*3/mm3 131*   INR   0.97     Results from last 7 days   Lab Units 07/01/22  1727   SODIUM mmol/L 138   POTASSIUM mmol/L 3.8   CHLORIDE mmol/L 100   CO2 mmol/L 25.4   BUN mg/dL 37*   CREATININE mg/dL 3.38*   CALCIUM mg/dL 8.6   GLUCOSE mg/dL 206*   ALBUMIN g/dL 3.41*   BILIRUBIN mg/dL 0.5   ALK PHOS U/L 82   AST (SGOT) U/L 22   ALT (SGPT) U/L 16   Estimated Creatinine Clearance: 12.2 mL/min (A) (by C-G formula based on SCr of 3.38 mg/dL (H)).    Glucose   Date/Time Value Ref Range Status   07/01/2022 1837 175 (H) 70 - 130 mg/dL Final     Comment:     Meter: [id number] : [id number] Rajani Zaragoza     Lab Results   Component Value Date    HGBA1C 6.00 (H) 05/11/2022     Lab Results   Component Value Date    TSH 26.830 (H) 05/10/2022    FREET4 1.05 05/11/2022     Microbiology Results (last 10 days)     Procedure Component Value - Date/Time    COVID-19 and FLU A/B PCR - Swab, Nasopharynx [[id number]]  (Normal) Collected: 07/01/22 0934    Lab Status: Final result Specimen: Swab from Nasopharynx Updated: 07/02/22 0017     COVID19 Not Detected     Influenza A PCR Not Detected     Influenza B PCR Not Detected    Narrative:      Fact sheet for providers: https://www.fda.gov/media/935672/download    Fact sheet for patients: https://www.fda.gov/media/754232/download    Test performed by PCR.         I have personally reviewed the above laboratory results.   ---------------------------------------------------------------------------------------------------------------------  Imaging Results (Last 7 Days)     Procedure Component Value Units Date/Time    MRI Brain Without Contrast [[id number]] Collected: 07/01/22 2049     Updated: 07/01/22 2051    Narrative:      MRI Brain WO    INDICATION:   Weakness, confusion with history of subdural hematoma    TECHNIQUE:   MRI of the brain without IV contrast.    COMPARISON:    CT head May 12, 2022 and July 1, 2022, MRI brain 11 2022    FINDINGS:    Small patchy acute versus early subacute infarcts involving  the left frontal white matter (approximately 10 mm rounded focus) and with a few very small foci of acute versus subacute infarct in the left cerebellar hemisphere.     Previously seen subdural hematoma is no longer visualized.. No mass or mass effect is identified. No hydrocephalus. Basal cisterns are not effaced.    There are scattered T2/FLAIR signal hyperintensities within the bilateral cerebral and deep white matter which are nonspecific but most commonly associated with chronic microvascular ischemic change. These are fairly extensive. Numerous old lacunar  infarcts in the basal ganglia on both sides, worse on the left.    Midline structures are within normal limits.    The major intracranial flow voids are maintained.    Left lens replacement.    Mild mucosal thickening scattered throughout the paranasal sinuses, possibly with air-fluid level in the sphenoid sinuses.    Calvarial marrow signal is within normal limits.        Impression:      1.  A few small patchy foci of acute versus early subacute infarct involving the left frontal lobe and left cerebellar hemisphere.  2.  Previously seen subdural hematomas no longer visualized.  3.  Extensive presumed chronic microvascular ischemic changes with multiple old lacunar infarcts.    Signer Name: ALTA WILLIAMSON MD   Signed: 7/1/2022 8:49 PM   Workstation Name: [id number]    [facility]    CT Abdomen Pelvis Without Contrast [[id number]] Collected: 07/01/22 1824     Updated: 07/01/22 1829    Narrative:      EXAM: CT ABDOMEN PELVIS WO CONTRAST-         TECHNIQUE: Multiple axial CT images were obtained from lung bases  through pubic symphysis WITH administration of IV contrast. Reformatted  images in the coronal and/or sagittal plane(s) were generated from the  axial data set to facilitate diagnostic accuracy and/or surgical  planning.  Oral Contrast:NONE.     Radiation dose reduction techniques were utilized per ALARA protocol.  Automated  exposure control was initiated through either or [employer] or  [employer] software packages by  protocol.    DOSE: 271.21 mGy.cm     Clinical information vomiting      Comparison 03/28/2022     FINDINGS:     Lower thorax: Clear. No effusions.     Abdomen:     Liver: Homogeneous. No focal hepatic mass or ductal dilatation.     Gallbladder: No dilation or stone identified.     Pancreas: Unremarkable. No mass or ductal dilatation.     Spleen: Homogeneous. No splenomegaly.     Adrenals: No mass.     Kidneys/ureters: No mass. No obstructive uropathy.  No evidence of  urolithiasis.     GI tract: Moderate volume stool in the colon. There is no evidence of  appendicitis     MESENTERY: Free fluid in the abdomen. There are small volume ascites or  dialysate     Vasculature: Evidence of atherosclerotic vascular disease     Abdominal wall: Percutaneous dialysis catheter enters in the right  anterior abdomen     Bladder: No focal mass or significant wall thickening     Reproductive: Unremarkable as visualized     Bones: No acute bony abnormality.       Impression:         1. Small volume ascites or dialysate. Patient has percutaneous dialysis  catheter.     2.No evidence of bowel obstruction  3. Other findings as above                    This report was finalized on 7/1/2022 6:26 PM by Dr. Boris Caballero MD.       CT Head Without Contrast [[id number]] Collected: 07/01/22 1815     Updated: 07/01/22 1818    Narrative:      CT HEAD WO CONTRAST-     CLINICAL INDICATION: dizziness        COMPARISON: 05/12/2022      TECHNIQUE: Axial images of the brain were obtained with out intravenous  contrast.  Reformatted images were created in the sagittal and coronal  planes.     DOSE: 1095.78 mGy.cm     Radiation dose reduction techniques were utilized per ALARA protocol.  Automated exposure control was initiated through either or CaseReader or  DoseRight software packages by  protocol.        FINDINGS:    BRAIN:   Unremarkable.  No hemorrhage.  No significant white matter  disease.  No edema.       VENTRICLES:  Cerebral atrophy    BONES/JOINTS:  Unremarkable.  No acute fracture.       SOFT TISSUES:  Unremarkable.       SINUSES:  no air fluid levels       MASTOID AIR CELLS:  Unremarkable as visualized.  No mastoid effusion.          Impression:          1. Atrophy and chronic microangiopathic change  2. No parenchymal mass, hemorrhage, or midline shift     This report was finalized on 7/1/2022 6:16 PM by Dr. Boris Caballero MD.       XR Chest 1 View [[id number]] Collected: 07/01/22 1733     Updated: 07/01/22 1735    Narrative:      XR CHEST 1 VW-     CLINICAL INDICATION: Chest pain protocol        COMPARISON: 05/11/2022      TECHNIQUE: Single frontal view of the chest.     FINDINGS:      LUNGS: Lungs are adequately aerated.      HEART AND MEDIASTINUM: Heart and mediastinal contours are unremarkable        SKELETON: Bony and soft tissue structures are unremarkable.             Impression:      No radiographic evidence of acute cardiac or pulmonary disease.     This report was finalized on 7/1/2022 5:33 PM by Dr. Boris Caballero MD.         I have personally reviewed the above radiology results.     Last Echocardiogram:  Results for orders placed during the hospital encounter of 05/11/22    Adult Transthoracic Echo Complete W/ Cont if Necessary Per Protocol    Interpretation Summary  · Normal left ventricular cavity size noted. Left ventricular wall thickness is consistent with mild to moderate concentric hypertrophy. All left ventricular wall segments contract normally  · Left ventricular ejection fraction appears to be 66 - 70%.  · Left ventricular diastolic function is consistent with (grade I) impaired relaxation.  · The aortic valve is structurally normal with no regurgitation or stenosis present.  · The mitral valve is structurally normal with no regurgitation or significant stenosis present.  · There is no evidence of  "pericardial effusion.    ---------------------------------------------------------------------------------------------------------------------    Assessment & Plan      ACUTE HOSPITAL PROBLEMS    -Acute and subacute multifocal punctate embolic appearing strokes in multiple vascular territories  • Tele neurology on board, input/assistance is much appreciated   • Non contrasted head CT and MRI brain without contrast results reviewed, see reports above   • Per neurology, concern for centro embolic source vs hypercoagulable state. Recommendation to rule out infective endocarditis and malignancy. Blood cultures x 2 obtained as well as ESR/CRP, pending. TTE with agitated saline ordered, consider RAMANDEEP if negative. Patient not up to date on malignancy screenings. She denies history of EGD/Colonoscopy in past, does report having upper GI study scheduled in near future. She cannot remember when her last mammogram was, reports it has \"been a while\".   • Obtain MRA of head and neck for further imaging, due to renal failure unable to obtain contrasted studies   • Obtain TTE with agitated saline study   • Obtain TSH, A1C, lipid panel   • Hold antiplatelet therapy d/t recent spontaneous subdural hematoma per neurology   • Continue with statin   • BP management as per below   • Neuro checks   • PT/OT/SLP   • Fall precautions   • Telemetry monitoring   • Per neurology, patient will need 28 day holter monitor at time of discharge     -Hypertensive urgency   -Essential hypertension  • -230 on initial presentation, improved in ED with PRN medication administration   • Per neurology, maintain SBP < 200 in first 24 hours   • Plan to resume home antihypertensive regimen once reconciled per pharmacy with appropriate holding parameters to prevent hypotension and/or bradycardia   • Closely monitor BP per hospital protocol, titrate medications as necessary  • Will make PRN IV hydralazine available     -Recent spontaneous subdural " hematoma   · Resolved per imaging   · Per neurology, hold antiplatelet therapy   · Monitor closely     -Nausea/vomiting  · CT abdomen/pelvis grossly unrevealing   · Likely multifactorial in setting of ESRD (uremia) as well as DM with possible underlying gastroparesis    · PRN antiemetics available   · Supportive care     -Normocytic anemia, AOCD  -Mild thrombocytopenia   · Hemoglobin actually significantly improved compared to baseline   · Platelets stable compared to labs on file, antiplatelet therapy deferred at this time per neurology recommendations   · No active bleeding noted or reported   · Closely monitor H&H, transfuse if necessary   · Repeat CBC in AM     -Prolonged QTc interval, 568 m/s  · Obtain mag level, potassium WNL  · Obtain repeat EKG for QTc monitoring, repeat EKG in AM as well   · Avoid further QTc prolonging agents as much as possible   · Telemetry monitoring in board   · Repeat electrolytes in AM      -BMI 16.81, suspect malnutrition/reported weight loss 50 lbs in 1 year   -Reported night sweats  · Consult nutrition   · Daily MVI   · Recommend patient have updated malignancy screenings to include mammogram and colonoscopy/EGD evaluations     -Hypoalbuminemia, multifactorial     -F/E/N  • No IV fluids. Replace electrolytes per protocol as necessary.     CHRONIC MEDICAL PROBLEMS    -ESRD on peritoneal dialysis: Creatinine appears to be at her baseline. Consult nephrology for management of peritoneal dialysis. Monitor closely.   -PAD: Cont home medication regimen, no antiplatelet therapy at this time.   -Hyperlipidemia: AM lipid panel. Lipitor on board   -HFpEF: Recent TTE reviewed. On exam, grossly compensated. Repeat TTE with agitated saline study ordered per neurology recommendations. Closely monitor volume status.   -Type II diabetes mellitus, insulin dependent  • Obtain HgbA1C  • Review home medication regimen once reconciled per pharmacy   • Hold home oral DM medications for now.  • Start  low dose SSI, titrate dosage as necessary   • Closely monitor blood glucose levels with accuchecks QAC and QHS  • Hypoglycemia protocol in place should it be necessary  -Hypothyroidism: TSH in 5/2022 sig elevated. Obtain repeat TSH now. Cont home dose of levothyroxine for now, titrate dosage if necessary depending on laboratory results.   -GERD: PPI   -Anxiety/Depressiohn: Supportive care. Cont home medication regimen once reconciled per pharmacy   ---------------------------------------------------  DVT Prophylaxis: SCDs  GI Prophylaxis: PPI   Activity: Up with assistance as tolerated, fall precautions, turn Q2H   ---------------------------------------------------  INPATIENT status due to the need for care which can only be reasonably provided in an hospital setting such as aggressive/expedited ancillary services and/or consultation services, the necessity for IV medications, close physician monitoring and/or the possible need for procedures.  In such, I feel patient’s risk for adverse outcomes and need for care warrant INPATIENT evaluation and predict the patient’s care encounter to likely last beyond 2 midnights.    Code Status: FULL CODE   ---------------------------------------------------  Disposition/Discharge planning:   · Pending clinical course   · Will need 28 day Holter monitor at time of discharge per neurology recommendations   · Need malignancy screening: mammogram, EGD/colonoscopy screenings   ---------------------------------------------------  I have discussed the patient's assessment and plan with the patient, nursing staff, and attending physician Dr. Berenice Hicks DO.     Marta Caceres PA-C  Hospitalist Service -- [facility]   Pager: [phone]    07/02/22  00:51 EDT    Attending Physician: Berenice Hicks DO       ---------------------------------------------------------------------------------------------------------------------          Electronically signed by Berenice Hicks  DO Rodolfo at 07/02/22 0659          Emergency Department Notes      Noe Reeves at 07/01/22 1627        EKG completed @ 1624 and given to Dr. Maradiaga.     Electronically signed by Noe Reeves at 07/01/22 1629     Rodrigo Hastings MD at 07/01/22 1701                 MEDICAL SCREENING:    Reason for Visit:     62-year-old female presents the emergency room with complaints of chest pain.  Patient reports that she had intermittent chest pain since yesterday.  She states she is been having nausea vomiting as well.  She states that she feels that it may be related to her diabetes.  She denies abdominal pain.  She reports that she feels confused as well.  She this is been ongoing for 1 day.  She feels like symptoms have been persistent with regards to her confusion and her nausea vomiting.  She states she takes Lantus and Humalog for diabetes and denies missing any of her medications.  She denies fever or chills.  She denies focal weakness or numbness.    Patient initially seen in triage.  The patient was advised further evaluation and diagnostic testing will be needed, some of the treatment and testing will be initiated in the lobby in order to begin the process.  The patient will be returned to the waiting area for the time being and possibly be re-assessed by a subsequent ED provider.  The patient will be brought back to the treatment area in as timely manner as possible.      Electronically signed by Rodrigo Hastings MD at 07/01/22 1702     Mila Gerard, PCT at 07/01/22 1838        Pt. Glucose 174 mg/dl. GLORIA Ayala notified.     Electronically signed by Mila Gerard, PCT at 07/01/22 1839     Lucy Rodrigez RN at 07/01/22 1844        Patient refused to be in and out cathed for urine sample and stated she wanted to want to attempt to provide urine.     Electronically signed by Lucy Rodrigez RN at 07/01/22 1844     Rodrigo Hastings MD at 07/01/22 1930          Subjective   62-year-old female  presents the emergency room with complaints of chest pain.  Patient reports that she had intermittent chest pain since yesterday.  She states she is been having nausea vomiting as well.  She states that she feels that it may be related to her diabetes.  She denies abdominal pain.  She reports that she feels confused as well.  She this is been ongoing for 1 day.  She feels like symptoms have been persistent with regards to her confusion and her nausea vomiting.  She states she takes Lantus and Humalog for diabetes and denies missing any of her medications.  She denies fever or chills.  She denies focal weakness or numbness.      Chest Pain  Pain location:  Substernal area  Pain quality: sharp    Pain radiates to:  Does not radiate  Pain severity:  Moderate  Timing:  Intermittent  Progression:  Waxing and waning  Relieved by:  Nothing  Worsened by:  Nothing  Associated symptoms: fatigue, nausea and vomiting    Associated symptoms: no back pain, no cough and no shortness of breath        Review of Systems   Constitutional: Positive for fatigue.   Respiratory: Negative for cough and shortness of breath.    Cardiovascular: Positive for chest pain.   Gastrointestinal: Positive for nausea and vomiting.   Musculoskeletal: Negative for back pain.   All other systems reviewed and are negative.      Past Medical History:   Diagnosis Date   • Arthritis    • Closed fracture of right fibula and tibia 12/25/2018   • Depression    • Diabetic ulcer of left foot associated with type 2 diabetes mellitus (HCC) 6/23/2017   • Diastolic dysfunction    • Disease of thyroid gland    • Elevated cholesterol    • End stage renal disease (HCC)    • Essential hypertension    • GERD (gastroesophageal reflux disease)    • History of transfusion    • Hyperlipidemia    • Iron deficiency anemia 12/30/2018   • PAD (peripheral artery disease) ([facility])    • Renal failure    • Type 2 diabetes mellitus with hyperglycemia, with long-term current use of  insulin ([facility]) 2018       Allergies   Allergen Reactions   • Penicillins Hives and GI Intolerance     Patient has received cefazolin, ceftriaxone and cefepime during past admissions.   • Codeine Hives, Rash and GI Intolerance     Pt tolerates Norco @ home   • Sulfa Antibiotics Hives, Rash and GI Intolerance     NAUSEA TOO       Past Surgical History:   Procedure Laterality Date   • ABDOMINAL SURGERY     • BACK SURGERY      Post spinal diskectomy, osteophytectomy in one lumbar interspace   • CATARACT EXTRACTION      Left    •  SECTION     • DENTAL PROCEDURE     • ENDOSCOPY     • FRACTURE SURGERY      right leg   • HYSTERECTOMY     • INCISION AND DRAINAGE LEG Left 4/15/2017    Procedure: INCISION AND DRAINAGE LOWER EXTREMITY;  Surgeon: Basilio Morris MD;  Location: [facility] OR;  Service:    • INCISION AND DRAINAGE LEG Left 2017    Procedure: Irrigation and Debridment abcess diabetic wound left foot with deep culture;  Surgeon: Basilio Morris MD;  Location: [facility] OR;  Service:    • INSERTION PERITONEAL DIALYSIS CATHETER N/A 2021    Procedure: INSERTION PERITONEAL DIALYSIS CATHETER LAPAROSCOPIC;  Surgeon: Edy Glover MD;  Location: [facility] OR;  Service: General;  Laterality: N/A;   • KNEE ARTHROSCOPY Right    • ORIF TIBIA FRACTURE Right 2018    Procedure: TIBIAL  FRACTURE OPEN REDUCTION INTERNAL FIXATION;  Surgeon: Jung Barragan MD;  Location: [facility] OR;  Service: Orthopedics   • TOE AMPUTATION Right     5th   • TUBAL ABDOMINAL LIGATION         Family History   Problem Relation Age of Onset   • Heart disease Mother    • Cancer Mother    • COPD Mother    • Diabetes Other    • Depression Other        Social History     Socioeconomic History   • Marital status:    Tobacco Use   • Smoking status: Never Smoker   • Smokeless tobacco: Never Used   Vaping Use   • Vaping Use: Never used   Substance and Sexual Activity   • Alcohol use: No   • Drug use: No   • Sexual activity: Defer            Objective   Physical Exam  Nursing note reviewed.   Constitutional:       Comments: Elderly.  Nontoxic appearing.  Frail.   HENT:      Head: Normocephalic and atraumatic.   Eyes:      Extraocular Movements: Extraocular movements intact.      Pupils: Pupils are equal, round, and reactive to light.   Cardiovascular:      Rate and Rhythm: Normal rate and regular rhythm.      Comments: 2+ radial pulse bilaterally.  No extrasystoles.  Pulmonary:      Breath sounds: Examination of the right-lower field reveals decreased breath sounds. Examination of the left-lower field reveals decreased breath sounds. Decreased breath sounds present.      Comments: No rhonchi's rales or wheezes.  No accessory muscle use.  Abdominal:      General: Bowel sounds are normal.      Palpations: Abdomen is soft.      Tenderness: There is no guarding.   Musculoskeletal:      Cervical back: Neck supple.      Comments: No calf tenderness appreciated.   Skin:     General: Skin is warm and dry.      Capillary Refill: Capillary refill takes less than 2 seconds.   Neurological:      General: No focal deficit present.      Mental Status: She is alert and oriented to person, place, and time.      Cranial Nerves: No cranial nerve deficit.   Psychiatric:         Mood and Affect: Mood normal.         Procedures          ED Course  ED Course as of 07/01/22 2350   Fri Jul 01, 2022   1759 Patient is noted to have elevated creatinine 3.3 it is close to her baseline 3.22.  Patient BUN 37.  Glucose 206.  Patient is not in DKA.  Lipase 12.  Patient was count 4.43. [BB]   1815 ECG 12 Lead  Vent. rate 93 BPM  KS interval 134 ms  QRS duration 84 ms  QT/QTcB 404/502 ms  P-R-T axes 78 4 35  Normal sinus rhythm  Possible Left atrial enlargement  Nonspecific ST abnormality  Prolonged QT  Abnormal ECG  When compared with ECG of 05-MAY-2022 19:30,  QRS duration has decreased [ES]   1842 Is noted to be hypertensive.  Patient is noted to have elevated troponin  0.085.  There is no ST elevation appreciated on EKG.  Patient is chronically elevated but is higher than previous checks. [BB]   1843 CT scan of head is unremarkable for acute abnormality [BB]   1843 CT Abdomen Pelvis Without Contrast    Result Date: 7/1/2022   1. Small volume ascites or dialysate. Patient has percutaneous dialysis catheter.  2.No evidence of bowel obstruction 3. Other findings as above       This report was finalized on 7/1/2022 6:26 PM by Dr. Boris Caballero MD.      CT Head Without Contrast    Result Date: 7/1/2022    1. Atrophy and chronic microangiopathic change 2. No parenchymal mass, hemorrhage, or midline shift  This report was finalized on 7/1/2022 6:16 PM by Dr. Boris Caballero MD.      XR Chest 1 View    Result Date: 7/1/2022  No radiographic evidence of acute cardiac or pulmonary disease.  This report was finalized on 7/1/2022 5:33 PM by Dr. Boris Caballero MD.       [BB]   1848 Patient with elevated blood pressure and patient was given IV hydralazine while in the emergency room continue to monitor. [BB]   1852 Patient repeat blood pressure currently is 177/86. [BB]   1955 Repeat EKG: Sinus tachycardia ventricular 105 parable 130 QRS 84 . [BB]   2006 Patient troponin is trending down from 0.085 to 0.081 [BB]   2106 Patient's MRI shows findings of subacute versus acute infarcts.  Patient symptoms have been ongoing since yesterday.  Have spoken to Dr. Jaramillo with neurology who states she will see patient.  Patient will likely be admitted to hospital. [BB]   2344 Have spoken to hospitalist who is agreeable to admit [BB]      ED Course User Index  [BB] Rodrigo Hastings MD  [ES] Clay Maradiaga MD                                           [facility]    Final diagnoses:   Cerebrovascular accident (CVA), unspecified mechanism (HCC)       ED Disposition  ED Disposition     ED Disposition   Decision to Admit    Condition   --    Comment   --             No follow-up provider  specified.       Medication List      No changes were made to your prescriptions during this visit.          Rodrigo Hastings MD  07/01/22 2350      Electronically signed by Rodrigo Hastings MD at 07/01/22 2350         Current Facility-Administered Medications   Medication Dose Route Frequency Provider Last Rate Last Admin   • acetaminophen (TYLENOL) tablet 650 mg  650 mg Oral Q6H PRN ENRICO'Matra Qiu PA       • atorvastatin (LIPITOR) tablet 80 mg  80 mg Oral Nightly Berenice Hicks DO   80 mg at 07/02/22 0304   • dextrose (D50W) (25 g/50 mL) IV injection 25 g  25 g Intravenous Q15 Min PRN Berenice Hicks DO       • dextrose (GLUTOSE) oral gel 15 g  15 g Oral Q15 Min PRN Berenice Hicks, DO       • folic acid (FOLVITE) tablet 1 mg  1 mg Oral Daily Berenice Hicks, DO       • glucagon (human recombinant) (GLUCAGEN DIAGNOSTIC) injection 1 mg  1 mg Intramuscular Q15 Min PRN Berenice Hicks, DO       • hydrALAZINE (APRESOLINE) injection 10 mg  10 mg Intravenous Q6H PRN ENRICO'Marta Qiu PA   10 mg at 07/02/22 0334   • HYDROcodone-acetaminophen (NORCO)  MG per tablet 1 tablet  1 tablet Oral Q8H PRN Berenice Hicks DO   1 tablet at 07/02/22 0616   • hydrOXYzine (ATARAX) tablet 25 mg  25 mg Oral TID PRN Berenice Hicks DO   25 mg at 07/02/22 0304   • Insulin Aspart (novoLOG) injection 0-7 Units  0-7 Units Subcutaneous TID AC Berenice Hicks, DO       • multivitamin (THERAGRAN) tablet 1 tablet  1 tablet Oral Daily Marta Caceres PA       • nitroglycerin (NITROSTAT) SL tablet 0.4 mg  0.4 mg Sublingual Q5 Min PRN Marta Caceres PA       • pantoprazole (PROTONIX) EC tablet 40 mg  40 mg Oral Q AM Marta Caceres PA   40 mg at 07/02/22 0616   • prochlorperazine (COMPAZINE) injection 2.5 mg  2.5 mg Intravenous Q8H PRN Berenice Hicks, DO       • sodium chloride 0.9 % flush 10 mL  10 mL Intravenous PRN Berenice Hicks, DO       • sodium chloride 0.9 %  flush 10 mL  10 mL Intravenous Q12H Berenice Hicks,    10 mL at 07/02/22 0304   • sodium chloride 0.9 % flush 10 mL  10 mL Intravenous PRN Berenice Hicks DO       • sodium chloride 0.9 % flush 10 mL  10 mL Intravenous PRN Ari Rosa DO       • thiamine (VITAMIN B-1) tablet 100 mg  100 mg Oral Daily Berenice Hicks DO         Orders (last 24 hrs)      Start     Ordered    07/03/22 0600  Phosphorus  Morning Draw         07/02/22 0235    07/03/22 0500  ECG 12 Lead  Tomorrow AM,   Status:  Canceled         07/02/22 0142    07/03/22 0000  Inpatient Nephrology Consult  Once        Specialty:  Nephrology  Provider:  (Not yet assigned)    07/02/22 0142    07/02/22 0900  thiamine (VITAMIN B-1) tablet 100 mg  Daily         07/02/22 0142    07/02/22 0900  folic acid (FOLVITE) tablet 1 mg  Daily         07/02/22 0142    07/02/22 0900  multivitamin (THERAGRAN) tablet 1 tablet  Daily         07/02/22 0358    07/02/22 0730  Insulin Aspart (novoLOG) injection 0-7 Units  3 Times Daily Before Meals         07/02/22 0142    07/02/22 0715  Connectors / Hubs Must Be Scrubbed 15 Seconds Using 70% Alcohol Before Access - Allow to Dry Before Accessing Line  Continuous         07/02/22 0715    07/02/22 0715  Change Needleless Connectors  Per Order Details        Comments: Change Needleless Connectors When:  - Removed For Any Reason  - Residual Blood or Debris Within Connector  - Prior to Drawing Blood Cultures  - Contamination of Connector  - After Administration of Blood or Blood Components    07/02/22 0715    07/02/22 0714  sodium chloride 0.9 % flush 10 mL  As Needed         07/02/22 0715    07/02/22 0700  prochlorperazine (COMPAZINE) injection 2.5 mg  Every 8 Hours PRN         07/02/22 0651    07/02/22 0633  POC Glucose Once  PROCEDURE ONCE         07/02/22 0622    07/02/22 0630  cloNIDine (CATAPRES) tablet 0.1 mg  Once,   Status:  Discontinued         07/02/22 0532    07/02/22 0600  POC Glucose Q6H   "Every 6 Hours      Comments: May Discontinue After 2 Consecutive Readings Less Than 140Notify Provider if 2 Readings Greater Than 140      07/02/22 0142    07/02/22 0600  Hemoglobin A1c  Morning Draw         07/02/22 0142    07/02/22 0600  Lipid Panel  Morning Draw         07/02/22 0142    07/02/22 0600  Vitamin B12  Morning Draw         07/02/22 0142    07/02/22 0600  Folate  Morning Draw         07/02/22 0142    07/02/22 0600  Phosphorus  Morning Draw         07/02/22 0142    07/02/22 0600  Troponin  Morning Draw         07/02/22 0142    07/02/22 0600  pantoprazole (PROTONIX) EC tablet 40 mg  Every Early Morning         07/02/22 0235    07/02/22 0600  CBC & Differential  Morning Draw         07/02/22 0235    07/02/22 0600  Basic Metabolic Panel  Morning Draw         07/02/22 0235    07/02/22 0600  Magnesium  Morning Draw         07/02/22 0235    07/02/22 0600  Sedimentation Rate  Morning Draw         07/02/22 0235    07/02/22 0600  CBC Auto Differential  PROCEDURE ONCE         07/02/22 0235    07/02/22 0600  C-reactive Protein  Morning Draw         07/02/22 0251    07/02/22 0536  HYDROcodone-acetaminophen (NORCO)  MG per tablet 1 tablet  Every 8 Hours PRN         07/02/22 0536    07/02/22 0453  Blood Culture - Blood, Wrist, Right  Once        \"And\" Linked Group Details    07/02/22 0236    07/02/22 0453  Blood Culture - Blood, Arm, Right  Once        Comments: From a different site than #1.     \"And\" Linked Group Details    07/02/22 0236    07/02/22 0400  Intake and Output  Every 4 Hours       07/02/22 0142    07/02/22 0316  prochlorperazine (COMPAZINE) injection 2.5 mg  Every 6 Hours PRN,   Status:  Discontinued         07/02/22 0316    07/02/22 0238  Magnesium  STAT         07/02/22 0237    07/02/22 0236  TSH  Once         07/02/22 0235    07/02/22 0235  hydrALAZINE (APRESOLINE) injection 10 mg  Every 6 Hours PRN         07/02/22 0235    07/02/22 0233  Telemetry - Maintain IV Access  Continuous         " 07/02/22 0235    07/02/22 0233  Continuous Cardiac Monitoring  Continuous         07/02/22 0235    07/02/22 0233  May Be Off Telemetry for Tests  Continuous         07/02/22 0235    07/02/22 0233  Notify Provider if ACLS Protocol Activated  Until Discontinued         07/02/22 0235    07/02/22 0233  Turn Patient  Now Then Every 2 Hours         07/02/22 0235    07/02/22 0233  Fall Precautions  Continuous         07/02/22 0235    07/02/22 0233  Nutrition Consult  Once        Provider:  (Not yet assigned)    07/02/22 0235    07/02/22 0232  nitroglycerin (NITROSTAT) SL tablet 0.4 mg  Every 5 Minutes PRN         07/02/22 0235    07/02/22 0231  acetaminophen (TYLENOL) tablet 650 mg  Every 6 Hours PRN         07/02/22 0235    07/02/22 0230  sodium chloride 0.9 % flush 10 mL  Every 12 Hours Scheduled         07/02/22 0142    07/02/22 0230  atorvastatin (LIPITOR) tablet 80 mg  Nightly         07/02/22 0142    07/02/22 0228  Diet Regular; Cardiac, Consistent Carbohydrate  Diet Effective Now         07/02/22 0227    07/02/22 0227  ECG 12 Lead  Once         07/02/22 0226    07/02/22 0226  hydrOXYzine (ATARAX) tablet 25 mg  3 Times Daily PRN         07/02/22 0226    07/02/22 0142  Vital Signs  Per Order Details        Comments: For ICU Admission: Vital Signs Every 2 Hours  For Telemetry Unit Admission: Vital Signs Every 4 Hours    07/02/22 0142    07/02/22 0142  Pulse Oximetry, Continuous  Continuous         07/02/22 0142    07/02/22 0142  Cardiac Monitoring  Continuous,   Status:  Canceled         07/02/22 0142    07/02/22 0142  Notify Provider  Until Discontinued         07/02/22 0142    07/02/22 0142  Nursing Dysphagia Screening (Complete Prior to Giving Anything By Mouth)  Once         07/02/22 0142    07/02/22 0142  RN to Place Order SLP Consult - Eval & Treat Choosing Reason of RN Dysphagia Screen Failed  Per Order Details        Comments: RN to Place Order SLP Consult - Eval & Treat Choosing Reason of RN Dysphagia Screen  Failed    07/02/22 0142 07/02/22 0142  Nurse to Call MD or Nutrition Services for Diet if Patient Passes Dysphagia Screen  Once         07/02/22 0142 07/02/22 0142  NPO Diet NPO Type: Strict NPO  Diet Effective Now,   Status:  Canceled        Comments: Strict NPO Until Nursing Dysphagia Screen Passed    07/02/22 0142 07/02/22 0142  Neuro Checks  Per Order Details        Comments: For ICU Admission: Neuro Checks to Include All Stroke Deficits Every Hour x10 Hours, Then Every 2 Hours,  For Telemetry Unit Admission: Neuro Checks to Include All Stroke Deficits Every 4 Hours    07/02/22 0142 07/02/22 0142  NIHSS Assessment  Every Shift      Comments: Turn off all sedation medications prior to performing assessment. Assessment to be performed upon admission, transfer to another unit, discharge, and with neurological decline. If NIHSS change is greater than or equal to 4 and/or neurological decline is noted notify physician.    07/02/22 0142 07/02/22 0142  Provide Stroke Education Material  Prior to Discharge        Comments: Educate patient PRN and daily during hospitalization.    07/02/22 0142 07/02/22 0142  OT Consult: Eval & Treat  Once         07/02/22 0142 07/02/22 0142  PT Consult: Eval & Treat As Tolerated  Once         07/02/22 0142 07/02/22 0142  SLP Consult: Eval & Treat Communication Disorder  Once        Comments: Per stroke protocol.    07/02/22 0142 07/02/22 0142  Inpatient Case Management  Consult  Once        Provider:  (Not yet assigned)    07/02/22 0142 07/02/22 0142  Inpatient Diabetes Educator Consult  Once        Provider:  (Not yet assigned)    07/02/22 0142 07/02/22 0142  Inpatient Neurology Consult Stroke  Once        Comments: ED spoke to Dr Jaramillo   Specialty:  Neurology  Provider:  Donya Jaramillo MD    07/02/22 0142 07/02/22 0142  Assessed for Rehabilitation Services  Once         07/02/22 0142 07/02/22 0142  Insert Peripheral IV   Once         07/02/22 0142    07/02/22 0142  Saline Lock & Maintain IV Access  Continuous,   Status:  Canceled         07/02/22 0142    07/02/22 0142  sodium chloride 0.9 % flush 10 mL  As Needed         07/02/22 0142 07/02/22 0142  Place Sequential Compression Device  Once         07/02/22 0142 07/02/22 0142  Maintain Sequential Compression Device  Continuous         07/02/22 0142 07/02/22 0142  Reason for Not Administering IV Thrombolytic  Once         07/02/22 0142 07/02/22 0142  Activity As Tolerated  Until Discontinued,   Status:  Canceled         07/02/22 0142    07/02/22 0142  Adult Transthoracic Echo Complete W/ Cont if Necessary Per Protocol (With Agitated Saline)  Once         07/02/22 0142 07/02/22 0142  Antithrombotic Already Ordered  Once         07/02/22 0142 07/02/22 0142  MRI Angiogram Head Without Contrast  1 Time Imaging         07/02/22 0142 07/02/22 0142  MRI Angiogram Neck Without Contrast  1 Time Imaging         07/02/22 0142 07/02/22 0142  Do NOT Hold Basal or Correction Scale Insulin When Patient is NPO, Hold Scheduled Mealtime (Bolus) Insulin if NPO  Continuous         07/02/22 0142 07/02/22 0142  Follow [facility] Hypoglycemia Standing Orders For Blood Glucose Less Than 70 mg/dL  Until Discontinued        Comments: ALERT PATIENT - NOT NPO & CAN SAFELY SWALLOW  Administer 4 oz Fruit Juice OR 4 oz Regular Soda OR 8 oz Milk OR 15-30 grams (1 tube) of Glucose Gel.  Recheck Blood Glucose Approximately 15 Minutes After Ingestion, Repeat Treatment & Continue to Recheck Blood Sugar Approximately Every 15 Minutes Until Blood Glucose is 70 or Higher.  Once Blood Glucose is 70 or Higher & if It Will Be More Than 60 Minutes Until Next Meal, Provide Appropriate Snack (Including Carbohydrate Food) Based on Meal Plan Orde coco. Give Meal Tray As Soon As Possible.    PATIENT HAS IV ACCESS - UNRESPONSIVE, NPO OR UNABLE TO SAFELY SWALLOW  Administer 25g (50ml) D50W IV Push.  Recheck Blood  Glucose Approximately 15 Minutes After Administration, if Blood Glucose Remains Less Than 70, Repeat Treatment   Recheck Blood Glucose Approximately 15 Minutes After 2nd Administration, if Blood Glucose Remains Less Than 70 After 2nd Dose of D50W, Contact Provider for Further Treatment Orders & Consider Adding IVF With D5W for [facility]    PATIENT WITHOUT IV ACCESS - UNRESPONSIVE, NPO OR UNABLE TO SAFELY SWALLOW  Administer 1mg Glucagon SQ & Establish IV Access.  Turn Patient on Side - Nausea / Vomiting May Occur.  Recheck Blood Glucose Approximately 15 Minutes After Administration.  If Blood Glucose Remains Less Than 70, Administer 25g D50W IV Push (50ml).  Recheck Blood Glucose Approximately 15 Minutes After Administration of D50W, if Blood Glucose Remains Less Than 70, Contact Provider for Further Treatment Orders & C  Consider Adding IVF With D5 for Maintenance    Document Event & Patient Response to Interventions in EMR, Document Medications on MAR  Notify Provider if Hypoglycemia Treatment Needed    07/02/22 0142    07/02/22 0142  dextrose (GLUTOSE) oral gel 15 g  Every 15 Minutes PRN         07/02/22 0142    07/02/22 0142  dextrose (D50W) (25 g/50 mL) IV injection 25 g  Every 15 Minutes PRN         07/02/22 0142    07/02/22 0142  glucagon (human recombinant) (GLUCAGEN DIAGNOSTIC) injection 1 mg  Every 15 Minutes PRN         07/02/22 0142    07/02/22 0138  Urinalysis, Microscopic Only - Urine, Clean Catch  Once         07/02/22 0137    07/02/22 0011  Code Status and Medical Interventions:  Continuous         07/02/22 0010    07/02/22 0005  Inpatient Admission  Once         07/02/22 0010    07/01/22 2359  prochlorperazine (COMPAZINE) injection 2.5 mg  Once         07/01/22 2357    07/01/22 2340  COVID-19 and FLU A/B PCR - Swab, Nasopharynx  Once         07/01/22 2339    07/01/22 2254  prochlorperazine (COMPAZINE) injection 2.5 mg  Once         07/01/22 2252    07/01/22 2254  hydrALAZINE (APRESOLINE)  injection 10 mg  Once         07/01/22 2252    07/01/22 1942  MRI Brain Without Contrast  1 Time Imaging         07/01/22 1942    07/01/22 1922  ECG 12 Lead  STAT         07/01/22 1921    07/01/22 1844  POC Glucose Once  PROCEDURE ONCE         07/01/22 1837    07/01/22 1828  hydrALAZINE (APRESOLINE) injection 20 mg  Once         07/01/22 1826    07/01/22 1800  CT Abdomen Pelvis Without Contrast  1 Time Imaging         07/01/22 1759    07/01/22 1725  Blood Gas, Arterial With Co-Ox  PROCEDURE ONCE         07/01/22 1722    07/01/22 1702  Blood Gas, Arterial -With Co-Ox Panel: Yes  Once         07/01/22 1701    07/01/22 1702  POC Glucose STAT  STAT         07/01/22 1701    07/01/22 1702  Lipase  STAT         07/01/22 1701    07/01/22 1702  Acetone  Once         07/01/22 1701    07/01/22 1701  Cardiac monitoring  Continuous,   Status:  Canceled         07/01/22 1701    07/01/22 1701  Continuous Pulse Oximetry  Continuous,   Status:  Canceled         07/01/22 1701    07/01/22 1701  Vital Signs  Per Hospital Policy         07/01/22 1701    07/01/22 1701  ECG 12 Lead  Once,   Status:  Canceled         07/01/22 1701    07/01/22 1701  XR Chest 1 View  1 Time Imaging         07/01/22 1701    07/01/22 1701  Insert peripheral IV  Once         07/01/22 1701    07/01/22 1701  [place] Draw  Once         07/01/22 1701    07/01/22 1701  CBC & Differential  Once         07/01/22 1701    07/01/22 1701  Comprehensive Metabolic Panel  Once         07/01/22 1701    07/01/22 1701  Troponin  Now Then Every 2 Hours       07/01/22 1701    07/01/22 1701  Protime-INR  Once         07/01/22 1701    07/01/22 1701  CT Head Without Contrast  1 Time Imaging         07/01/22 1701    07/01/22 1701  Urinalysis With Microscopic If Indicated (No Culture) - Urine, Clean Catch  Once         07/01/22 1701    07/01/22 1701  Green Top (Gel)  PROCEDURE ONCE         07/01/22 1701    07/01/22 1701  Lavender Top  PROCEDURE ONCE         07/01/22 1701    07/01/22  1701  Gold Top - SST  PROCEDURE ONCE         07/01/22 1701    07/01/22 1701  Light Blue Top  PROCEDURE ONCE         07/01/22 1701    07/01/22 1701  CBC Auto Differential  PROCEDURE ONCE         07/01/22 1701    07/01/22 1700  sodium chloride 0.9 % flush 10 mL  As Needed         07/01/22 1701    07/01/22 1636  ECG 12 Lead  Once         07/01/22 1635    Unscheduled  Oxygen Therapy- Nasal Cannula; 2 LPM; Titrate for SPO2: equal to or greater than, 92%  Continuous PRN       07/01/22 1701    Unscheduled  ECG 12 Lead  As Needed       07/01/22 1701    Unscheduled  Order CT Head Without Contrast for Neurological Decline  As Needed       07/02/22 0142    Unscheduled  Telemetry - Pulse Oximetry  Continuous PRN      Comments: If Patient Develops Unresponsiveness, Acute Dyspnea, Cyanosis or Suspected Hypoxemia Start Continuous Pulse Ox Monitoring, Apply Oxygen & Notify Provider    07/02/22 0235    Unscheduled  Potassium  As Needed      Comments: For Ventricular Arrhythmias      07/02/22 0235    Unscheduled  Magnesium  As Needed      Comments: For Ventricular Arrhythmias      07/02/22 0235    Unscheduled  Troponin  As Needed      Comments: For Chest Pain      07/02/22 0235    Unscheduled  Blood Gas, Arterial -With Co-Ox Panel: Yes  As Needed      Comments: Draw for Acute Dyspnea, Cyanosis, Suspected Hypoxemia or UnresponsivenessNotify Provider of Results      07/02/22 0235    Unscheduled  ACLS Protocol For Life Threatening Dysrhythmias (Unless Code Status Indicates Otherwise)  As Needed       07/02/22 0235    Unscheduled  Up With Assistance  As Needed       07/02/22 0235    Unscheduled  Change Dressing to IV Site As Needed When Damp, Loose or Soiled  As Needed       07/02/22 0715    Unscheduled  Insert New Peripheral IV  As Needed      Comments: Frequency Per Facility Policy    07/02/22 0715                Physician Progress Notes (last 24 hours)  Notes from 07/01/22 0723 through 07/02/22 0723   No notes of this type exist for  this encounter.            Consult Notes (last 24 hours)      Donya Jaramillo MD at [phone]            [facility]   Teleneurology Note    Patient Name: Reny Elena  : 1960  MRN: [id number]  Primary Care Physician: Susan Stephens APRN  Referring Site: [place]  Referring Provider: Vnekata NARVAEZ    Subjective   Teleneurology Initial Data           Neurologist Evaluation Date: 22               Stroke Risk Factors/ Pertinent Data     Stroke risk factors: diabetes, prior stroke/ TIA  Anticoagulants prior to arrival: none     Pre- Stroke Modified [place] Scale     Pre-Stroke Modified [place] Scale: 3 - Moderate disability.  Requiring some help, but able to walk without assistance.    NIH Stroke Scale           Interval: baseline  1a. Level of Consciousness: 0-->Alert, keenly responsive  1b. LOC Questions: 0-->Answers both questions correctly  1c. LOC Commands: 0-->Performs both tasks correctly  2. Best Gaze: 0-->Normal  3. Visual: 0-->No visual loss  4. Facial Palsy: 0-->Normal symmetrical movements  5a. Motor Arm, Left: 0-->No drift, limb holds 90 (or 45) degrees for full 10 secs  5b. Motor Arm, Right: 0-->No drift, limb holds 90 (or 45) degrees for full 10 secs  6a. Motor Leg, Left: 0-->No drift, leg holds 30 degree position for full 5 secs  6b. Motor Leg, Right: 0-->No drift, leg holds 30 degree position for full 5 secs  7. Limb Ataxia: 0-->Absent  8. Sensory: 0-->Normal, no sensory loss  9. Best Language: 0-->No aphasia, normal  10. Dysarthria: 1-->Mild-to-moderate dysarthria, patient slurs at least some words and, at worst, can be understood with some difficulty  11. Extinction and Inattention (formerly Neglect): 0-->No abnormality  Total (NIH Stroke Scale): 1     Objective     Result Review    Results     Personal review of CNS imaging:(Official report by radiologist pending)  Imaging  CT Imaging Review: CT Imaging reviewed, NO acute infarct/ hemorrhage seen    Thrombolytic  "  Thrombolytics: tPA not given  Tissue Plasminogen Activator (tPA) Exclusion Criteria: Onset unknown or GREATER than 4.5 hours     Assessment & Plan   Assessment/ Plan     Assessment:  Acute Stroke Evaluation: Suspected ACUTE ischemic stroke     Ms. Reny Elena is a 61 yo woman with ESRD on peritoneal dialysis at home, insulin dependent type II DM, HTN, hypothyroidism, chronic diastolic dysfunction, PAD, arthritis and depression, recent spontaneous subdural hematoma presenting with nausea.    Ms. Elena presents alone. She provides her own history.     Ms. Elena states she had her boyfriend bring her in today because of persistent nausea and vomiting over the last 2 days. She reports this is an acute on chronic problem that started months ago. She reports 50lb weight loss over the last year due to these symptoms. She has had multiple hospital admissions over the last year. She denies fevers, no diarrhea, no cough, +chills, +night sweats. She had recent body imaging (March CTchest, today CT abdomen) without pathology concerning for malignancy. She is scheduled for an outpatient upper GI evaluation. She reports generalized fatigue and weakness. No acute onset unilateral weakness, sensory loss, difficulty with speech or comprehension. She reports a lifetime of \"walking difficulty because of my knee problems.\" No assistive devices. No falls.     On exam she is sleepy and falls asleep twice during our interview  She awakens easily to voice   She requires frequent repeating of questions and commands during our interview  She is oriented to 2022, self and place   Able to do simple calculations   Her naming in intact to high frequency objects  Repetition intact  She has high amplitude low frequency jerks in her upper extremities   Her face is symmetric   Her extraocular eye movements are intact  She reports left monocular blurry vision but no field cut   She has myoclonic jerks but no drift in her upper extremities  She " holds her lower extremities antigravity for 5 seconds  Her finger-nose-finger is challenging due to jerks  Gait was deferred.     Labs  WBC 4.4  plts 131   Cr 3.38     Mri brain  1.  A few small patchy foci of acute versus early subacute infarct involving the left frontal lobe and left cerebellar hemisphere.  2.  Previously seen subdural hematomas no longer visualized.  3.  Extensive presumed chronic microvascular ischemic changes with multiple old lacunar infarcts.    #Acute and subacute multifocal punctate embolic appearing strokes in multiple vascular territories   C/f centroembolic source vs hypercoagable state. Given multifocal and subacute and acute infarcts, severe weight loss, chills and night sweats would evaluate for infectious sources such as endocarditis and malignancy.    - [ ] Will defer antiplatelet medications for today given recent spontaneous subdural hematoma and mild thrombocytopenia to 131 and consideration of endocarditis.   - [ ] Vessel imaging of the head and neck   - [ ] Assure malignancy screening up to date given rapid weight loss over the last few months  - [ ] Echocardiogram with bubble, consider RAMANDEEP given slow decline, weight loss, fatigue, evaluating closely for endocarditis    - [ ] blood cultures  - [ ] check electrolytes, b12, folate and nutritional labs. Monitor for refeeding syndrome   - [ ] empiric thiamine and folate   - [ ] Atorvastatin 80mg    - [x] MRI brain with and without contrast   - [ ] Telemetry   - [ ] Stroke labs - lipids, hgbA1c, ESR/CRP  - [ ] 28 day holter monitor at discharge   - [ ] PT and OT evaluation   - [ ] hx of difficult to manage blood pressure that is highly variable and hx of hypertensive emergency. On presentation 230s. Would aim for <200 in the first 24hrs.      Donya Jaramillo MD  We will continue to follow   If you have any questions about the patient recommendations,   please call [phone] at anytime and ask to speak with the neurologist  on call.   Press 1 for an emergent consult and 2 for a non-emergent consult.         Disposition     Disposition: The patient will remain at the referring institution for further evaluation and management    Medical Decision Making  Medical Data Reviewed: Data reviewed including: clinical labs, radiology and/or medical tests  Length of visit: 60 minutes    IDonya MD, saw the patient on 07/01/22 at   for an initial in-patient or emergency room telememedicine face to face consult using interactive technology for 60 minutes. The location of the patient was [place]. I was located at  . I was assisted with the exam by  .    I have proceeded with this evaluation at the request of the referring practitioner as it is felt to be an emergency setting and no appropriate specialist is available to perform this evaluation. The originating hospital has reported that this is the correct patient and has obtained consent from the patient/surrogate to perform this telemedicine evaluation(including obtaining history, performing examination and reviewing data provided by the patient an/or originating site of care provider)    I have introduced myself to the patient, provided my credentials, disclosed my location, and determined that, based on review of the patient's chart and discussion with the patient's primary team, telemedicine via a HIPAA compliant, real-time, face-to-face two-way, interactive audio and video platform is an appropriate and effective means of providing the service.    The patient/surrogate has a right to refuse this evaluation as they have been explained risks including potential loss of confidentiality, benefits, alternatives, and the potential need for subsequent face-to-face care. In this evaluation, we will be providing recommendations only.  The ultimate decision to follow or not to follow these recommendations will be left to the bedside treating/requesting practitioner.    The  patient/surrogate has been notified that other healthcare professionals including technical person may be involved in this A/V evaluation.  All laws concerning confidentiality and patient access to medical records and copies of medical records apply to telemedicine.  The patient/surrogate has received the originating site's Health Notice of Privacy Practices.    Donya Jaramillo MD    Electronically signed by Donya Jaramillo MD at 07/01/22 2146

## 2022-07-02 NOTE — H&P
[facility] Medicine Services  HISTORY & PHYSICAL    Patient Identification:  Name:  Reny Elena  Age:  62 y.o.  Sex:  female  :  1960  MRN:  [id number]   Visit Number:  [id number]  Admit Date: 2022   Primary Care Physician:  Susan Stephens APRN     Subjective     Chief complaint:   Chief Complaint   Patient presents with   • Chest Pain   • Vomiting     History of presenting illness:   Patient is a 62 y.o. female with past medical history significant for ESRD on peritoneal dialysis, HFpEF, essential hypertension, hyperlipidemia, PAD, insulin dependent T2DM, GERD, and anxiety that presented to the [facility] emergency department for evaluation of persistent nausea and vomiting. Patient states that she has had recurrent/persistent nausea and vomiting for the past month. She states that for the past three days her symptoms have been acutely worsened. She reports very little oral intake due to N/V. She denies any abdominal pain or change in BMs. She denies any fevers but does reports chills. She reports night sweats. She reports unintentional weight loss of approximately 50 lbs in the past year. She denies history of colonoscopy/EGD, but reports she is scheduled for outpatient upper GI study soon. She denies any ill contacts with similar symptoms. She denies eating undercooked foods or spoiled foods. She does report ongoing fatigue and generalized weakness. She reports having a fall on Wednesday where she tripped and fell. She reports hitting the back of her head and loosing consciousness after fall. She denies any further episodes, no syncope, no headaches or visual changes. Denies any lateralizing weakness or focal weakness. Denies any numbness, tingling or paraesthesias. She does report intermittent jerking of her upper extremities that has been present for the past month, she denies seeing medical provider for this. She reports chronic back pain from surgery in  past, no acute worsening, no bowel/bladder dysfunction. Patient does state that yesterday she had a brief episode of chest pain for which she describes as tightness across her chest without radiation. She states it did not last very long and resolved without intervention. She denies any further chest pain episodes. Denies any dyspnea. No cough or upper respiratory complaints.      Upon arrival to the ED, vitals were temperature 97.8, heart rate 90, respiratory rate 16, blood pressure 218/110, and oxygen saturation 96% on room air.  Initial troponin T 0.085 with repeat at 0.081.  ABG with pH 7.460, PCO2 39.6, PO2 75.3, HCO3 28.1, and oxygen saturation 96.4% on room air.  CMP with glucose 206, creatinine 3.38, BUN 37, EGFR 14.6, and albumin 3.41.  Blood acetone negative.  Lipase negative.  CBC with hemoglobin 11.8, platelets 131,000, otherwise grossly unremarkable.  COVID-19 and influenza screening negative.  Chest x-ray with no evidence of acute cardiopulmonary disease.  CT abdomen pelvis without contrast with small volume ascites or dialysate, patient has percutaneous dialysis catheter in place.  There is no evidence of obstruction.  CT head without contrast with atrophy and chronic microangiopathic changes, no parenchymal mass/hemorrhage/midline shift appreciated.  MRI brain without contrast with a few small patchy foci of acute versus early subacute infarct involving the left frontal lobe and left cerebral hemisphere.  Previously seen subdural hematomas no longer visualized.  There is extensive presumed chronic microvascular ischemic changes with multiple old lacunar infarcts noted.  Known ED medication administration: 10 mg IV hydralazine x1, 20 mg IV hydralazine x1, Compazine 2.5 mg IV x2.    Patient has been admitted to the progressive care unit for further evaluation and treatment.     Present during exam: N/A    ---------------------------------------------------------------------------------------------------------------------   Review of Systems   Constitutional: Positive for activity change, appetite change, chills, diaphoresis (Night sweats ), fatigue and unexpected weight change (50 lbs in 1 year ). Negative for fever.   HENT: Negative for congestion, sore throat and trouble swallowing.    Eyes: Negative for discharge and visual disturbance.   Respiratory: Negative for cough, shortness of breath and wheezing.    Cardiovascular: Positive for chest pain (Brief solitary episode). Negative for palpitations and leg swelling.   Gastrointestinal: Positive for nausea and vomiting. Negative for abdominal pain, constipation and diarrhea.   Genitourinary: Negative for decreased urine volume, dysuria, frequency and urgency.   Musculoskeletal: Positive for back pain (Chronic, unchanged from baseline ) and gait problem (Chronic, reports her knee dislocates frequently ). Negative for arthralgias and myalgias.   Skin: Negative for color change and wound.   Neurological: Positive for tremors (Intermittent myoclonic jerking) and weakness. Negative for dizziness, syncope, light-headedness and headaches.   Psychiatric/Behavioral: Negative for confusion. The patient is not nervous/anxious.       ---------------------------------------------------------------------------------------------------------------------   Past Medical History:   Diagnosis Date   • Arthritis    • Closed fracture of right fibula and tibia 12/25/2018   • Depression    • Diabetic ulcer of left foot associated with type 2 diabetes mellitus (HCC) 6/23/2017   • Diastolic dysfunction    • Disease of thyroid gland    • Elevated cholesterol    • End stage renal disease (HCC)    • Essential hypertension    • GERD (gastroesophageal reflux disease)    • History of transfusion    • Hyperlipidemia    • Iron deficiency anemia 12/30/2018   • PAD (peripheral artery disease) (HCC)     • Renal failure    • Type 2 diabetes mellitus with hyperglycemia, with long-term current use of insulin ([facility]) 2018     Past Surgical History:   Procedure Laterality Date   • ABDOMINAL SURGERY     • BACK SURGERY      Post spinal diskectomy, osteophytectomy in one lumbar interspace   • CATARACT EXTRACTION      Left    •  SECTION     • DENTAL PROCEDURE     • ENDOSCOPY     • FRACTURE SURGERY      right leg   • HYSTERECTOMY     • INCISION AND DRAINAGE LEG Left 4/15/2017    Procedure: INCISION AND DRAINAGE LOWER EXTREMITY;  Surgeon: Basilio Morris MD;  Location: [facility] OR;  Service:    • INCISION AND DRAINAGE LEG Left 2017    Procedure: Irrigation and Debridment abcess diabetic wound left foot with deep culture;  Surgeon: Basilio Morris MD;  Location: [facility] OR;  Service:    • INSERTION PERITONEAL DIALYSIS CATHETER N/A 2021    Procedure: INSERTION PERITONEAL DIALYSIS CATHETER LAPAROSCOPIC;  Surgeon: Edy Glover MD;  Location: [facility] OR;  Service: General;  Laterality: N/A;   • KNEE ARTHROSCOPY Right    • ORIF TIBIA FRACTURE Right 2018    Procedure: TIBIAL  FRACTURE OPEN REDUCTION INTERNAL FIXATION;  Surgeon: Jung Barragan MD;  Location: [facility] OR;  Service: Orthopedics   • TOE AMPUTATION Right     5th   • TUBAL ABDOMINAL LIGATION       Family History   Problem Relation Age of Onset   • Heart disease Mother    • Cancer Mother    • COPD Mother    • Diabetes Other    • Depression Other      Social History     Socioeconomic History   • Marital status:    Tobacco Use   • Smoking status: Never Smoker   • Smokeless tobacco: Never Used   Vaping Use   • Vaping Use: Never used   Substance and Sexual Activity   • Alcohol use: No   • Drug use: No   • Sexual activity: Defer     ---------------------------------------------------------------------------------------------------------------------   Allergies:  Penicillins, Codeine, and Sulfa  antibiotics  ---------------------------------------------------------------------------------------------------------------------   Medications below are reported home medications pulling from within the system; at this time, these medications have not been reconciled unless otherwise specified and are in the verification process for further verifcation as current home medications.    Prior to Admission Medications     Prescriptions Last Dose Informant Patient Reported? Taking?    calcitriol (ROCALTROL) 0.25 MCG capsule  Pharmacy Yes No    Take 0.25 mcg by mouth Daily.    carvedilol (COREG) 12.5 MG tablet   No No    Take 1 tablet by mouth 2 (Two) Times a Day With Meals.    cetirizine (zyrTEC) 10 MG tablet  Pharmacy Yes No    Take 10 mg by mouth Daily.    cloNIDine (CATAPRES) 0.1 MG tablet  Pharmacy Yes No    Take 0.1 mg by mouth 3 (Three) Times a Day.    clopidogrel (PLAVIX) 75 MG tablet  Pharmacy No No    Take 1 tablet by mouth Daily.    docusate sodium (COLACE) 100 MG capsule  Pharmacy Yes No    Take 100 mg by mouth 2 (Two) Times a Day.    fluticasone (FLONASE) 50 MCG/ACT nasal spray  Pharmacy Yes No    2 sprays into the nostril(s) as directed by provider Daily As Needed for Allergies.    gabapentin (NEURONTIN) 300 MG capsule  Pharmacy Yes No    Take 300 mg by mouth Daily.    glucose blood test strip   No No    Use to test blood glucose up to four times daily as needed. Diagnosis: Type 2 Diabetes - Insulin Dependent    HYDROcodone-acetaminophen (NORCO)  MG per tablet   Yes No    Take 1 tablet by mouth 3 (Three) Times a Day As Needed for Moderate Pain .    hydrOXYzine (ATARAX) 25 MG tablet  Pharmacy No No    Take 1 tablet by mouth 3 (Three) Times a Day As Needed for Anxiety.    insulin aspart (novoLOG) 100 UNIT/ML injection  Pharmacy Yes No    Inject 3 Units under the skin into the appropriate area as directed 3 (Three) Times a Day Before Meals.    insulin glargine (LANTUS, SEMGLEE) 100 UNIT/ML injection   Pharmacy Yes No    Inject 10 Units under the skin into the appropriate area as directed Every Night.    isosorbide mononitrate (IMDUR) 30 MG 24 hr tablet   No No    Take 1 tablet by mouth Daily.    levothyroxine (SYNTHROID, LEVOTHROID) 50 MCG tablet  Pharmacy Yes No    Take 50 mcg by mouth Every Morning.    lisinopril (PRINIVIL,ZESTRIL) 20 MG tablet   No No    Take 1 tablet by mouth at bedtime.    pantoprazole (PROTONIX) 40 MG EC tablet  Pharmacy Yes No    Take 40 mg by mouth Daily.    pravastatin (PRAVACHOL) 20 MG tablet  Pharmacy Yes No    Take 20 mg by mouth Daily.    prochlorperazine (COMPAZINE) 10 MG tablet  Pharmacy Yes No    Take 5 mg by mouth Every 8 (Eight) Hours As Needed for Nausea or Vomiting.    rOPINIRole (REQUIP) 0.5 MG tablet  Pharmacy Yes No    Take 0.5 mg by mouth 2 (Two) Times a Day.    tiZANidine (ZANAFLEX) 4 MG tablet  Pharmacy Yes No    Take 4 mg by mouth Every 6 (Six) Hours As Needed for Muscle Spasms.    traZODone (DESYREL) 100 MG tablet  Pharmacy No No    Take 1 tablet by mouth Every Night.        ---------------------------------------------------------------------------------------------------------------------    Objective     Hospital Scheduled Meds:        Current listed hospital scheduled medications may not yet reflect those currently placed in orders that are signed and held, awaiting patient's arrival to floor/unit.    ---------------------------------------------------------------------------------------------------------------------   Vital Signs:  Temp:  [97.8 °F (36.6 °C)] 97.8 °F (36.6 °C)  Heart Rate:  [] 103  Resp:  [16] 16  BP: (165-239)/() 165/94  Mean Arterial Pressure (Non-Invasive) for the past 24 hrs (Last 3 readings):   Noninvasive MAP (mmHg)   07/02/22 0032 131   07/01/22 2332 154   07/01/22 1942 108     SpO2 Percentage    07/01/22 1942 07/01/22 2332 07/02/22 0032   SpO2: 97% 98% 94%     SpO2:  [94 %-99 %] 94 %  on   ;   Device (Oxygen Therapy): room  air    Body mass index is 16.81 kg/m².  Wt Readings from Last 3 Encounters:   07/01/22 45.8 kg (101 lb)   05/19/22 52.6 kg (115 lb 14.4 oz)   05/05/22 50.3 kg (111 lb)       ---------------------------------------------------------------------------------------------------------------------   Physical Exam:  Physical Exam  Nursing note reviewed.   Constitutional:       General: She is awake. She is not in acute distress.     Appearance: She is well-developed and underweight. She is ill-appearing (Chronically ). She is not toxic-appearing.      Comments: Sitting up in bed. On room air. No acute distress noted. No family present at bedside.   HENT:      Head: Normocephalic and atraumatic.      Mouth/Throat:      Mouth: Mucous membranes are moist.   Eyes:      General: No visual field deficit.     Extraocular Movements: Extraocular movements intact.      Right eye: Normal extraocular motion and no nystagmus.      Left eye: Normal extraocular motion and no nystagmus.      Conjunctiva/sclera: Conjunctivae normal.      Pupils: Pupils are equal, round, and reactive to light.   Cardiovascular:      Rate and Rhythm: Regular rhythm. Tachycardia present.      Pulses:           Dorsalis pedis pulses are 2+ on the right side and 2+ on the left side.      Heart sounds: Normal heart sounds. No murmur heard.    No friction rub. No gallop.   Pulmonary:      Effort: Pulmonary effort is normal. No tachypnea, accessory muscle usage or respiratory distress.      Breath sounds: Normal breath sounds and air entry. No wheezing, rhonchi or rales.      Comments: On room air. Speaks in full sentences without dyspnea.  Chest:      Chest wall: No tenderness.   Abdominal:      General: Bowel sounds are normal. There is no distension.      Palpations: Abdomen is soft.      Tenderness: There is no abdominal tenderness. There is no guarding or rebound.   Genitourinary:     Comments: No tate catheter in place.  Musculoskeletal:      Cervical back:  Neck supple.      Right lower leg: No edema.      Left lower leg: No edema.   Skin:     General: Skin is warm and dry.      Capillary Refill: Capillary refill takes less than 2 seconds.   Neurological:      General: No focal deficit present.      Mental Status: She is alert and oriented to person, place, and time.      GCS: GCS eye subscore is 4. GCS verbal subscore is 5. GCS motor subscore is 6.      Cranial Nerves: Cranial nerves are intact. No dysarthria or facial asymmetry.      Sensory: Sensation is intact.      Motor: Weakness (Generalized, no lateralizing or focal weakness appreciated) and tremor (Frequent intermittent myoclonic jerks of upper extremities) present. No pronator drift.      Coordination: Finger-Nose-Finger Test abnormal (Complicated 2/2 frequent myoclonic jerks of upper extremities). Heel to Currie Test normal.      Comments: Awake and alert. Follows commands. Answers questions appropriately. Moves all extremities equally. Strength and sensation grossly intact. No focal neuro deficit on exam.   Psychiatric:         Attention and Perception: Attention normal.         Mood and Affect: Mood and affect normal.         Speech: Speech normal.         Behavior: Behavior is cooperative.         Cognition and Memory: Cognition and memory normal.       ---------------------------------------------------------------------------------------------------------------------  EKG:  Pending cardiology read. Per my interpretation: Sinus tachycardia,  BPM, QTc prolonged at 568 ms/, non specific ST abnormality noted. Await final cardiology read.     Pending cardiology read. Per my interpretation: NSR with HR 93 BPM, QTc prolonged at 502 ms, non specific ST abnormality. Await final cardiology read.       Telemetry:    Sinus tachycardia 100s    I have personally reviewed the EKG/Telemetry strip  ---------------------------------------------------------------------------------------------------------------------    Results from last 7 days   Lab Units 07/01/22  1931 07/01/22  1727   TROPONIN T ng/mL 0.081* 0.085*         Results from last 7 days   Lab Units 07/01/22  1722   PH, ARTERIAL pH units 7.460*   PO2 ART mm Hg 75.3*   PCO2, ARTERIAL mm Hg 39.6   HCO3 ART mmol/L 28.1*     Results from last 7 days   Lab Units 07/01/22  1727   WBC 10*3/mm3 4.43   HEMOGLOBIN g/dL 11.8*   HEMATOCRIT % 35.8   MCV fL 89.5   MCHC g/dL 33.0   PLATELETS 10*3/mm3 131*   INR  0.97     Results from last 7 days   Lab Units 07/01/22  1727   SODIUM mmol/L 138   POTASSIUM mmol/L 3.8   CHLORIDE mmol/L 100   CO2 mmol/L 25.4   BUN mg/dL 37*   CREATININE mg/dL 3.38*   CALCIUM mg/dL 8.6   GLUCOSE mg/dL 206*   ALBUMIN g/dL 3.41*   BILIRUBIN mg/dL 0.5   ALK PHOS U/L 82   AST (SGOT) U/L 22   ALT (SGPT) U/L 16   Estimated Creatinine Clearance: 12.2 mL/min (A) (by C-G formula based on SCr of 3.38 mg/dL (H)).    Glucose   Date/Time Value Ref Range Status   07/01/2022 1837 175 (H) 70 - 130 mg/dL Final     Comment:     Meter: [id number] : [id number] Rajani Zaragoza     Lab Results   Component Value Date    HGBA1C 6.00 (H) 05/11/2022     Lab Results   Component Value Date    TSH 26.830 (H) 05/10/2022    FREET4 1.05 05/11/2022     Microbiology Results (last 10 days)     Procedure Component Value - Date/Time    COVID-19 and FLU A/B PCR - Swab, Nasopharynx [[id number]]  (Normal) Collected: 07/01/22 3829    Lab Status: Final result Specimen: Swab from Nasopharynx Updated: 07/02/22 0017     COVID19 Not Detected     Influenza A PCR Not Detected     Influenza B PCR Not Detected    Narrative:      Fact sheet for providers: https://www.fda.gov/media/337151/download    Fact sheet for patients: https://www.fda.gov/media/401371/download    Test performed by PCR.         I have personally reviewed the above laboratory results.   ---------------------------------------------------------------------------------------------------------------------  Imaging Results (Last 7 Days)      Procedure Component Value Units Date/Time    MRI Brain Without Contrast [[id number]] Collected: 07/01/22 2049     Updated: 07/01/22 2051    Narrative:      MRI Brain WO    INDICATION:   Weakness, confusion with history of subdural hematoma    TECHNIQUE:   MRI of the brain without IV contrast.    COMPARISON:    CT head May 12, 2022 and July 1, 2022, MRI brain 11 2022    FINDINGS:    Small patchy acute versus early subacute infarcts involving the left frontal white matter (approximately 10 mm rounded focus) and with a few very small foci of acute versus subacute infarct in the left cerebellar hemisphere.     Previously seen subdural hematoma is no longer visualized.. No mass or mass effect is identified. No hydrocephalus. Basal cisterns are not effaced.    There are scattered T2/FLAIR signal hyperintensities within the bilateral cerebral and deep white matter which are nonspecific but most commonly associated with chronic microvascular ischemic change. These are fairly extensive. Numerous old lacunar  infarcts in the basal ganglia on both sides, worse on the left.    Midline structures are within normal limits.    The major intracranial flow voids are maintained.    Left lens replacement.    Mild mucosal thickening scattered throughout the paranasal sinuses, possibly with air-fluid level in the sphenoid sinuses.    Calvarial marrow signal is within normal limits.        Impression:      1.  A few small patchy foci of acute versus early subacute infarct involving the left frontal lobe and left cerebellar hemisphere.  2.  Previously seen subdural hematomas no longer visualized.  3.  Extensive presumed chronic microvascular ischemic changes with multiple old lacunar infarcts.    Signer Name: ALTA WILLIAMSON MD   Signed: 7/1/2022 8:49 PM   Workstation Name: [id number]    [facility]    CT Abdomen Pelvis Without Contrast [[id number]] Collected: 07/01/22 1824     Updated: 07/01/22 1829    Narrative:       EXAM: CT ABDOMEN PELVIS WO CONTRAST-         TECHNIQUE: Multiple axial CT images were obtained from lung bases  through pubic symphysis WITH administration of IV contrast. Reformatted  images in the coronal and/or sagittal plane(s) were generated from the  axial data set to facilitate diagnostic accuracy and/or surgical  planning.  Oral Contrast:NONE.     Radiation dose reduction techniques were utilized per ALARA protocol.  Automated exposure control was initiated through either or UNIF[employer] Software or  DoseRight software packages by  protocol.    DOSE: 271.21 mGy.cm     Clinical information vomiting      Comparison 03/28/2022     FINDINGS:     Lower thorax: Clear. No effusions.     Abdomen:     Liver: Homogeneous. No focal hepatic mass or ductal dilatation.     Gallbladder: No dilation or stone identified.     Pancreas: Unremarkable. No mass or ductal dilatation.     Spleen: Homogeneous. No splenomegaly.     Adrenals: No mass.     Kidneys/ureters: No mass. No obstructive uropathy.  No evidence of  urolithiasis.     GI tract: Moderate volume stool in the colon. There is no evidence of  appendicitis     MESENTERY: Free fluid in the abdomen. There are small volume ascites or  dialysate     Vasculature: Evidence of atherosclerotic vascular disease     Abdominal wall: Percutaneous dialysis catheter enters in the right  anterior abdomen     Bladder: No focal mass or significant wall thickening     Reproductive: Unremarkable as visualized     Bones: No acute bony abnormality.       Impression:         1. Small volume ascites or dialysate. Patient has percutaneous dialysis  catheter.     2.No evidence of bowel obstruction  3. Other findings as above                    This report was finalized on 7/1/2022 6:26 PM by Dr. Boris Caballero MD.       CT Head Without Contrast [[id number]] Collected: 07/01/22 1815     Updated: 07/01/22 1818    Narrative:      CT HEAD WO CONTRAST-     CLINICAL INDICATION: dizziness         COMPARISON: 05/12/2022      TECHNIQUE: Axial images of the brain were obtained with out intravenous  contrast.  Reformatted images were created in the sagittal and coronal  planes.     DOSE: 1095.78 mGy.cm     Radiation dose reduction techniques were utilized per ALARA protocol.  Automated exposure control was initiated through either or DNAdigest or  DoseRight software packages by  protocol.        FINDINGS:    BRAIN:  Unremarkable.  No hemorrhage.  No significant white matter  disease.  No edema.       VENTRICLES:  Cerebral atrophy    BONES/JOINTS:  Unremarkable.  No acute fracture.       SOFT TISSUES:  Unremarkable.       SINUSES:  no air fluid levels       MASTOID AIR CELLS:  Unremarkable as visualized.  No mastoid effusion.          Impression:          1. Atrophy and chronic microangiopathic change  2. No parenchymal mass, hemorrhage, or midline shift     This report was finalized on 7/1/2022 6:16 PM by Dr. Boris Caballero MD.       XR Chest 1 View [[id number]] Collected: 07/01/22 1733     Updated: 07/01/22 1735    Narrative:      XR CHEST 1 VW-     CLINICAL INDICATION: Chest pain protocol        COMPARISON: 05/11/2022      TECHNIQUE: Single frontal view of the chest.     FINDINGS:      LUNGS: Lungs are adequately aerated.      HEART AND MEDIASTINUM: Heart and mediastinal contours are unremarkable        SKELETON: Bony and soft tissue structures are unremarkable.             Impression:      No radiographic evidence of acute cardiac or pulmonary disease.     This report was finalized on 7/1/2022 5:33 PM by Dr. Boris Caballero MD.         I have personally reviewed the above radiology results.     Last Echocardiogram:  Results for orders placed during the hospital encounter of 05/11/22    Adult Transthoracic Echo Complete W/ Cont if Necessary Per Protocol    Interpretation Summary  · Normal left ventricular cavity size noted. Left ventricular wall thickness is consistent with mild to moderate concentric  "hypertrophy. All left ventricular wall segments contract normally  · Left ventricular ejection fraction appears to be 66 - 70%.  · Left ventricular diastolic function is consistent with (grade I) impaired relaxation.  · The aortic valve is structurally normal with no regurgitation or stenosis present.  · The mitral valve is structurally normal with no regurgitation or significant stenosis present.  · There is no evidence of pericardial effusion.    ---------------------------------------------------------------------------------------------------------------------    Assessment & Plan      ACUTE HOSPITAL PROBLEMS    -Acute and subacute multifocal punctate embolic appearing strokes in multiple vascular territories  • Tele neurology on board, input/assistance is much appreciated   • Non contrasted head CT and MRI brain without contrast results reviewed, see reports above   • Per neurology, concern for centro embolic source vs hypercoagulable state. Recommendation to rule out infective endocarditis and malignancy. Blood cultures x 2 obtained as well as ESR/CRP, pending. TTE with agitated saline ordered, consider RAMANDEEP if negative. Patient not up to date on malignancy screenings. She denies history of EGD/Colonoscopy in past, does report having upper GI study scheduled in near future. She cannot remember when her last mammogram was, reports it has \"been a while\".   • Obtain MRA of head and neck for further imaging, due to renal failure unable to obtain contrasted studies   • Obtain TTE with agitated saline study   • Obtain TSH, A1C, lipid panel   • Hold antiplatelet therapy d/t recent spontaneous subdural hematoma per neurology   • Continue with statin   • BP management as per below   • Neuro checks   • PT/OT/SLP   • Fall precautions   • Telemetry monitoring   • Per neurology, patient will need 28 day holter monitor at time of discharge     -Hypertensive urgency   -Essential hypertension  • -230 on initial " presentation, improved in ED with PRN medication administration   • Per neurology, maintain SBP < 200 in first 24 hours   • Plan to resume home antihypertensive regimen once reconciled per pharmacy with appropriate holding parameters to prevent hypotension and/or bradycardia   • Closely monitor BP per hospital protocol, titrate medications as necessary  • Will make PRN IV hydralazine available     -Recent spontaneous subdural hematoma   · Resolved per imaging   · Per neurology, hold antiplatelet therapy   · Monitor closely     -Nausea/vomiting  · CT abdomen/pelvis grossly unrevealing   · Likely multifactorial in setting of ESRD (uremia) as well as DM with possible underlying gastroparesis    · PRN antiemetics available   · Supportive care     -Normocytic anemia, AOCD  -Mild thrombocytopenia   · Hemoglobin actually significantly improved compared to baseline   · Platelets stable compared to labs on file, antiplatelet therapy deferred at this time per neurology recommendations   · No active bleeding noted or reported   · Closely monitor H&H, transfuse if necessary   · Repeat CBC in AM     -Prolonged QTc interval, 568 m/s  · Obtain mag level, potassium WNL  · Obtain repeat EKG for QTc monitoring, repeat EKG in AM as well   · Avoid further QTc prolonging agents as much as possible   · Telemetry monitoring in board   · Repeat electrolytes in AM      -BMI 16.81, suspect malnutrition/reported weight loss 50 lbs in 1 year   -Reported night sweats  · Consult nutrition   · Daily MVI   · Recommend patient have updated malignancy screenings to include mammogram and colonoscopy/EGD evaluations     -Hypoalbuminemia, multifactorial     -F/E/N  • No IV fluids. Replace electrolytes per protocol as necessary.     CHRONIC MEDICAL PROBLEMS    -ESRD on peritoneal dialysis: Creatinine appears to be at her baseline. Consult nephrology for management of peritoneal dialysis. Monitor closely.   -PAD: Cont home medication regimen, no  antiplatelet therapy at this time.   -Hyperlipidemia: AM lipid panel. Lipitor on board   -HFpEF: Recent TTE reviewed. On exam, grossly compensated. Repeat TTE with agitated saline study ordered per neurology recommendations. Closely monitor volume status.   -Type II diabetes mellitus, insulin dependent  • Obtain HgbA1C  • Review home medication regimen once reconciled per pharmacy   • Hold home oral DM medications for now.  • Start low dose SSI, titrate dosage as necessary   • Closely monitor blood glucose levels with accuchecks QAC and QHS  • Hypoglycemia protocol in place should it be necessary  -Hypothyroidism: TSH in 5/2022 sig elevated. Obtain repeat TSH now. Cont home dose of levothyroxine for now, titrate dosage if necessary depending on laboratory results.   -GERD: PPI   -Anxiety/Depressiohn: Supportive care. Cont home medication regimen once reconciled per pharmacy   ---------------------------------------------------  DVT Prophylaxis: SCDs  GI Prophylaxis: PPI   Activity: Up with assistance as tolerated, fall precautions, turn Q2H   ---------------------------------------------------  INPATIENT status due to the need for care which can only be reasonably provided in an hospital setting such as aggressive/expedited ancillary services and/or consultation services, the necessity for IV medications, close physician monitoring and/or the possible need for procedures.  In such, I feel patient’s risk for adverse outcomes and need for care warrant INPATIENT evaluation and predict the patient’s care encounter to likely last beyond 2 midnights.    Code Status: FULL CODE   ---------------------------------------------------  Disposition/Discharge planning:   · Pending clinical course   · Will need 28 day Holter monitor at time of discharge per neurology recommendations   · Need malignancy screening: mammogram, EGD/colonoscopy screenings   ---------------------------------------------------  I have discussed the  patient's assessment and plan with the patient, nursing staff, and attending physician Dr. Berenice Hicks DO.     Marta Caceres PA-C  [facility] [place] [facility] -- [facility]   Pager: [phone]    07/02/22  00:51 EDT    Attending Physician: Berenice Hicks DO       ---------------------------------------------------------------------------------------------------------------------

## 2022-07-02 NOTE — PROGRESS NOTES
[facility] HOSPITALIST PROGRESS NOTE     Patient Identification:  Name:  Reny Elena  Age:  62 y.o.  Sex:  female  :  1960  MRN:  [id number]  Visit Number:  [id number]  ROOM: [address]     Primary Care Provider:  Susan Stephens APRN    Length of stay in inpatient status:  0    Subjective     Chief Compliant:    Chief Complaint   Patient presents with   • Chest Pain   • Vomiting       History of Presenting Illness:    Patient seen in follow-up for acute/subacute embolic stroke.  She is awake, alert and oriented x3.  Complains of generalized weakness but no focal deficits.  NIHSS 0.  She reports nausea but says this is chronic and takes p.o. Compazine at home.  No adverse events noted overnight.    Objective     Current Hospital Meds:atorvastatin, 80 mg, Oral, Nightly  calcitriol, 0.25 mcg, Oral, Daily  docusate sodium, 100 mg, Oral, BID  folic acid, 1 mg, Oral, Daily  gabapentin, 300 mg, Oral, Daily  Insulin Aspart, 0-7 Units, Subcutaneous, TID AC  insulin detemir, 10 Units, Subcutaneous, Nightly  levothyroxine, 50 mcg, Oral, Q AM  multivitamin, 1 tablet, Oral, Daily  pantoprazole, 40 mg, Oral, Q AM  rOPINIRole, 0.5 mg, Oral, BID  sodium chloride, 10 mL, Intravenous, Q12H  vitamin B-1, 100 mg, Oral, Daily    dilTIAZem, 5-15 mg/hr, Last Rate: 5 mg/hr (22 1134)        Current Antimicrobial Therapy:  Anti-Infectives (From admission, onward)    None        Current Diuretic Therapy:  Diuretics (From admission, onward)    None        ----------------------------------------------------------------------------------------------------------------------  Vital Signs:  Temp:  [97.8 °F (36.6 °C)-99.6 °F (37.6 °C)] 99.6 °F (37.6 °C)  Heart Rate:  [] 96  Resp:  [15-24] 18  BP: (138-239)/() 195/95  SpO2:  [91 %-99 %] 95 %  on   ;   Device (Oxygen Therapy): room air  Body mass index is 16.27 kg/m².    Wt Readings from Last 3 Encounters:   22 44.4 kg (97 lb 12.8 oz)   22 52.6  kg (115 lb 14.4 oz)   05/05/22 50.3 kg (111 lb)     Intake & Output (last 3 days)     None        Diet Regular; Cardiac, Consistent Carbohydrate  ----------------------------------------------------------------------------------------------------------------------  Physical exam:  Constitutional: Elderly female, nontoxic, Well-developed and well-nourished, resting comfortably in bed, no acute distress.      HENT:  Head:  Normocephalic and atraumatic.  Mouth:  Moist mucous membranes.    No facial droop.  No dysphagia or pressured speech.  Eyes:  Conjunctivae and EOM are normal. No scleral icterus.   Cardiovascular:  Normal rate, regular rhythm and normal heart sounds with no murmur. No JVD.   Pulmonary/Chest:  No respiratory distress, no wheezes, no crackles, with normal breath sounds and good air movement. Unlabored. No accessory muscle use.  Abdominal:  Soft. No distension and no tenderness.  Bowel sounds present. No rebound or guarding.   Musculoskeletal:  No tenderness, and no deformity.  No red or swollen joints anywhere.    Neurological:  Alert and oriented to person, place, and time.  No cranial nerve deficit.  Generalized weakness but no focal deficits appreciated.  Skin:  Skin is warm and dry. No rash noted. No pallor.   Peripheral vascular:  No clubbing, no cyanosis, no edema. Pedal and tibial pulses 2 out of 4 bilaterally.   ----------------------------------------------------------------------------------------------------------------------  Results from last 7 days   Lab Units 07/02/22  0512 07/01/22  1727   CRP mg/dL <0.30  --    WBC 10*3/mm3 6.62 4.43   HEMOGLOBIN g/dL 11.2* 11.8*   HEMATOCRIT % 34.1 35.8   MCV fL 88.6 89.5   MCHC g/dL 32.8 33.0   PLATELETS 10*3/mm3 152 131*   INR   --  0.97     Results from last 7 days   Lab Units 07/01/22  1722   PH, ARTERIAL pH units 7.460*   PO2 ART mm Hg 75.3*   PCO2, ARTERIAL mm Hg 39.6   HCO3 ART mmol/L 28.1*     Results from last 7 days   Lab Units  07/02/22 0512 07/01/22 1931 07/01/22  1727   SODIUM mmol/L 142  --  138   POTASSIUM mmol/L 3.4*  --  3.8   MAGNESIUM mg/dL 1.6 1.5*  --    CHLORIDE mmol/L 101  --  100   CO2 mmol/L 25.1  --  25.4   BUN mg/dL 42*  --  37*   CREATININE mg/dL 3.78*  --  3.38*   CALCIUM mg/dL 8.7  --  8.6   PHOSPHORUS mg/dL 4.0  --   --    GLUCOSE mg/dL 219*  --  206*   ALBUMIN g/dL  --   --  3.41*   BILIRUBIN mg/dL  --   --  0.5   ALK PHOS U/L  --   --  82   AST (SGOT) U/L  --   --  22   ALT (SGPT) U/L  --   --  16   Estimated Creatinine Clearance: 10.5 mL/min (A) (by C-G formula based on SCr of 3.78 mg/dL (H)).  No results found for: AMMONIA  Results from last 7 days   Lab Units 07/02/22 0512 07/01/22 1931 07/01/22  1727   TROPONIN T ng/mL 0.071* 0.081* 0.085*         Results from last 7 days   Lab Units 07/02/22 0512   CHOLESTEROL mg/dL 202*   TRIGLYCERIDES mg/dL 80   HDL CHOL mg/dL 84*   LDL CHOL mg/dL 104*     Hemoglobin A1C   Date/Time Value Ref Range Status   07/02/2022 0512 7.60 (H) 4.80 - 5.60 % Final     Glucose   Date/Time Value Ref Range Status   07/02/2022 1127 120 70 - 130 mg/dL Final     Comment:     Meter: [id number] : [id number] MYRANDA PARRISH   07/02/2022 0622 187 (H) 70 - 130 mg/dL Final     Comment:     Meter: [id number] : [id number] CHRISTO KRUEGER   07/01/2022 1837 175 (H) 70 - 130 mg/dL Final     Comment:     Meter: [id number] : [id number] Rajani Zaragoza     Lab Results   Component Value Date    TSH 7.680 (H) 07/01/2022    FREET4 1.05 05/11/2022     No results found for: PREGTESTUR, PREGSERUM, HCG, HCGQUANT  Pain Management Panel     Pain Management Panel Latest Ref Rng & Units 5/11/2022 11/6/2021    CREATININE UR mg/dL - 24.4    AMPHETAMINES SCREEN, URINE Negative Negative -    BARBITURATES SCREEN Negative Negative -    BENZODIAZEPINE SCREEN, URINE Negative Positive(A) -    BUPRENORPHINEUR Negative Negative -    COCAINE SCREEN, URINE Negative Negative -    METHADONE SCREEN, URINE Negative Negative -     METHAMPHETAMINEUR Negative Negative -        Brief Urine Lab Results  (Last result in the past 365 days)      Color   Clarity   Blood   Leuk Est   Nitrite   Protein   CREAT   Urine HCG        07/02/22 0131 Yellow   Clear   Negative   Negative   Negative   >=300 mg/dL (3+)               No results found for: BLOODCX  No results found for: URINECX  No results found for: WOUNDCX  No results found for: STOOLCX  No results found for: RESPCX  No results found for: AFBCX  Results from last 7 days   Lab Units 07/02/22  0512   SED RATE mm/hr 8   CRP mg/dL <0.30       I have personally looked at the labs and they are summarized above.  ----------------------------------------------------------------------------------------------------------------------  Detailed radiology reports for the last 24 hours:  Imaging Results (Last 24 Hours)     Procedure Component Value Units Date/Time    MRI Angiogram Neck Without Contrast [[id number]] Collected: 07/02/22 1337     Updated: 07/02/22 1337    Narrative:      EXAMINATION: MRI ANGIOGRAM NECK WITHOUT CONTRAST-      CLINICAL INDICATION: Stroke, follow up        COMPARISON: MRI brain 07/01/2022.     PROCEDURE:  Using 3-D time-of-flight, magnetic resonance angiography of the carotid  vessels was performed.  Thin cut source images were acquired. Reformatted images were created.     FINDINGS:  No evidence of large vessel occlusion.     No focal stenosis is seen.     No extrinsic deviation of the carotid vessels.       Impression:      No hemodynamically significant stenosis.           MRI Angiogram Head Without Contrast [[id number]] Collected: 07/02/22 1336     Updated: 07/02/22 1336    Narrative:      EXAMINATION: MRI ANGIOGRAM HEAD WITHOUT CONTRAST-      CLINICAL INDICATION: Neuro deficit, acute, stroke suspected        COMPARISON: MRI dated 07/01/2022.     PROCEDURE: Using 3-D time-of-flight, magnetic resonance angiography of  the cerebral circulation was performed.  Thin cut source  images were acquired and reformatted images were  created.     FINDINGS:  No evidence of large vessel occlusion.     No aneurysm.     No evidence of vascular malformation.     No contrast enhancing abnormalities in the parenchyma.       Impression:      No large vessel occlusion, aneurysm, or vascular malformation.           MRI Brain Without Contrast [[id number]] Collected: 07/01/22 2049     Updated: 07/01/22 2051    Narrative:      MRI Brain WO    INDICATION:   Weakness, confusion with history of subdural hematoma    TECHNIQUE:   MRI of the brain without IV contrast.    COMPARISON:    CT head May 12, 2022 and July 1, 2022, MRI brain 11 2022    FINDINGS:    Small patchy acute versus early subacute infarcts involving the left frontal white matter (approximately 10 mm rounded focus) and with a few very small foci of acute versus subacute infarct in the left cerebellar hemisphere.     Previously seen subdural hematoma is no longer visualized.. No mass or mass effect is identified. No hydrocephalus. Basal cisterns are not effaced.    There are scattered T2/FLAIR signal hyperintensities within the bilateral cerebral and deep white matter which are nonspecific but most commonly associated with chronic microvascular ischemic change. These are fairly extensive. Numerous old lacunar  infarcts in the basal ganglia on both sides, worse on the left.    Midline structures are within normal limits.    The major intracranial flow voids are maintained.    Left lens replacement.    Mild mucosal thickening scattered throughout the paranasal sinuses, possibly with air-fluid level in the sphenoid sinuses.    Calvarial marrow signal is within normal limits.        Impression:      1.  A few small patchy foci of acute versus early subacute infarct involving the left frontal lobe and left cerebellar hemisphere.  2.  Previously seen subdural hematomas no longer visualized.  3.  Extensive presumed chronic microvascular ischemic changes with  multiple old lacunar infarcts.    Signer Name: ALTA WILLIAMSON MD   Signed: 7/1/2022 8:49 PM   Workstation Name: [id number]    [facility]    CT Abdomen Pelvis Without Contrast [[id number]] Collected: 07/01/22 1824     Updated: 07/01/22 1829    Narrative:      EXAM: CT ABDOMEN PELVIS WO CONTRAST-         TECHNIQUE: Multiple axial CT images were obtained from lung bases  through pubic symphysis WITH administration of IV contrast. Reformatted  images in the coronal and/or sagittal plane(s) were generated from the  axial data set to facilitate diagnostic accuracy and/or surgical  planning.  Oral Contrast:NONE.     Radiation dose reduction techniques were utilized per ALARA protocol.  Automated exposure control was initiated through either or [employer] or  DoseRight software packages by  protocol.    DOSE: 271.21 mGy.cm     Clinical information vomiting      Comparison 03/28/2022     FINDINGS:     Lower thorax: Clear. No effusions.     Abdomen:     Liver: Homogeneous. No focal hepatic mass or ductal dilatation.     Gallbladder: No dilation or stone identified.     Pancreas: Unremarkable. No mass or ductal dilatation.     Spleen: Homogeneous. No splenomegaly.     Adrenals: No mass.     Kidneys/ureters: No mass. No obstructive uropathy.  No evidence of  urolithiasis.     GI tract: Moderate volume stool in the colon. There is no evidence of  appendicitis     MESENTERY: Free fluid in the abdomen. There are small volume ascites or  dialysate     Vasculature: Evidence of atherosclerotic vascular disease     Abdominal wall: Percutaneous dialysis catheter enters in the right  anterior abdomen     Bladder: No focal mass or significant wall thickening     Reproductive: Unremarkable as visualized     Bones: No acute bony abnormality.       Impression:         1. Small volume ascites or dialysate. Patient has percutaneous dialysis  catheter.     2.No evidence of bowel obstruction  3. Other findings as  above                    This report was finalized on 7/1/2022 6:26 PM by Dr. Boris Caballero MD.       CT Head Without Contrast [[id number]] Collected: 07/01/22 1815     Updated: 07/01/22 1818    Narrative:      CT HEAD WO CONTRAST-     CLINICAL INDICATION: dizziness        COMPARISON: 05/12/2022      TECHNIQUE: Axial images of the brain were obtained with out intravenous  contrast.  Reformatted images were created in the sagittal and coronal  planes.     DOSE: 1095.78 mGy.cm     Radiation dose reduction techniques were utilized per ALARA protocol.  Automated exposure control was initiated through either or [employer] or  DoseRight software packages by  protocol.        FINDINGS:    BRAIN:  Unremarkable.  No hemorrhage.  No significant white matter  disease.  No edema.       VENTRICLES:  Cerebral atrophy    BONES/JOINTS:  Unremarkable.  No acute fracture.       SOFT TISSUES:  Unremarkable.       SINUSES:  no air fluid levels       MASTOID AIR CELLS:  Unremarkable as visualized.  No mastoid effusion.          Impression:          1. Atrophy and chronic microangiopathic change  2. No parenchymal mass, hemorrhage, or midline shift     This report was finalized on 7/1/2022 6:16 PM by Dr. Boris Caballero MD.       XR Chest 1 View [[id number]] Collected: 07/01/22 1733     Updated: 07/01/22 1735    Narrative:      XR CHEST 1 VW-     CLINICAL INDICATION: Chest pain protocol        COMPARISON: 05/11/2022      TECHNIQUE: Single frontal view of the chest.     FINDINGS:      LUNGS: Lungs are adequately aerated.      HEART AND MEDIASTINUM: Heart and mediastinal contours are unremarkable        SKELETON: Bony and soft tissue structures are unremarkable.             Impression:      No radiographic evidence of acute cardiac or pulmonary disease.     This report was finalized on 7/1/2022 5:33 PM by Dr. Boris Caballero MD.           Assessment & Plan      Patient is a 62-year-old female with history significant for ESRD on  peritoneal dialysis, essential hypertension and HFpEF who presented to the ER with complaints of persistent nausea and vomiting and noted to have acute/subacute embolic appearing infarcts.    #Acute versus subacute ischemic stroke  --Mechanism: cardioembolic vs vs small vessel disease (lacunar). No TPA d/t recent subdural hematoma. no thrombectomy d/t no thrombus on imaging.  --Risk factors: ESRD, essential hypertension, type 2 diabetes mellitus  --Stroke labs: TSH, A1C, troponins  --CT head: Atrophy and chronic microangiopathic change  --MRA head/neck no large vessel occlusion, aneurysm or vascular malformation, no significant stenosis  --MRI head w/o few small patchy foci of acute versus early subacute infarct involving left frontal lobe and left cerebellar hemisphere, extensive chronic microvascular ischemic changes with multiple old lacunar infarcts  --bedside dysphagia passed  --Echocardiogram with preserved systolic function and negative agitated saline study  --NIHSS scale on admission and daily  --Blood pressure goal SBP <200, placed on Cardizem gtt. for tight control, target -200  --pt-ot eval  --will order MRI cervical spine without contrast to rule out myelopathy per neuro recs  --Hold antiplatelet therapy per neurology until TTE and blood cultures resulted to rule out possible embolic stroke due to IE  --Continue atorvastatin  --Neurology recommended LP to rule out leptomeningeal neoplastic process, I discussed this with patient at bedside who noted previous LP that was unsuccessful at bedside after multiple unsuccessful attempts, will discuss and will need to ask radiology to perform this on Monday if needed  --will need Holter monitor at the time of discharge  --Neurology following, appreciate recommendations    #Hypertensive urgency  --Systolic greater than 240s in the ER, received IV hydralazine x2.  Although permissive hypertension is in effect, received antihypertensives due to systolic  greater than 240.  She is remained borderline and has occasionally been greater than 200.  In order to accurately and effectively control BP without wide swings in hypo/hypertension, will start Cardizem, target -200 during the first 24 hours for permissive hypertension  --Start Cardizem gtt.    #NSTEMI, likely type II  --EKG sinus tachycardia, troponins peaked at 0.085  --Hold aspirin/Plavix per above until okayed for antiplatelets by neuro, hold Coreg due to permissive hypertension  --Echo per above  --Normal stress test in 2019  --resume meds as appropriate  --Continue to monitor on telemetry    #Chronic nausea/vomiting, concern for gastroparesis  --when patient's above acute issues are resolved, will discuss barium swallow for evaluation of gastroparesis  --As needed Zofran, caution due to prolonged QTC    #ESRD on peritoneal dialysis  --CT abdomen/pelvis noted small volume ascites versus dialysate  --Nephrology consulted for inpatient assistance    #Unintentional weight loss  --suspect due to peritoneal dialysis & protein calorie malnutrition, outpatient work-up, will need outpatient age-appropriate work-up including mammogram and colonoscopy if not up-to-date    #Type 2 diabetes mellitus, A1c 6%, basal 10u HS, SSI, adjust as needed  #Hypothyroidism, TSH 7.6, continue Synthroid  #Recent spontaneous subdural hematoma, resolved on imaging  #Prolonged QTC, QTc 568  #Chronic normocytic anemia  #Peripheral arterial disease, hold antiplatelets  #Hyperlipidemia, continue atorvastatin  #HFpEF, compensated  #Hypokalemia/hypomagnesemia, replace as needed w/ PD nightly  #GERD, PPI    CHECKLIST:  Abx: None  VTE: SCDs  GI ppx: PPI  Diet: Consistent carb  Code: CPR, full  Dispo: Patient is high risk due to acute versus subacute strokes.  Anticipate greater than 2 midnight stay.  Disposition unclear at this time due to critical illness, suspect home with home health services versus short-term rehab.    Ari NICK  DO Moe  [facility] Hospitalist  07/02/22  15:05 EDT

## 2022-07-02 NOTE — PLAN OF CARE
Goal Outcome Evaluation:           Progress: no change  Outcome Evaluation: Pt seen again today by Neurology. MRI angiograms done. Started on IV Cardizem to help control BP and keep SBP between 180-200. Currently on hold due to . Pt continues to c/o headache.

## 2022-07-02 NOTE — PLAN OF CARE
Goal Outcome Evaluation:  Plan of Care Reviewed With: patient   Progress: no change     Pt VSS, afebrile, on room air. Pt current /75, PRN hydralazine given per order, see MAR. PRN compazine given as well for N/V, see MAR. Pt currently resting well, no S/S of distress noted. Pt has no complaints at this time, will continue to monitor for remainder of shift.       Problem: Adult Inpatient Plan of Care  Goal: Plan of Care Review  Outcome: Ongoing, Progressing  Flowsheets (Taken 7/2/2022 0439)  Progress: no change  Plan of Care Reviewed With: patient  Goal: Patient-Specific Goal (Individualized)  Outcome: Ongoing, Progressing  Goal: Absence of Hospital-Acquired Illness or Injury  Outcome: Ongoing, Progressing  Intervention: Prevent Skin Injury  Recent Flowsheet Documentation  Taken 7/2/2022 0236 by Leanna Last RN  Skin Protection:   tubing/devices free from skin contact   adhesive use limited  Intervention: Prevent and Manage VTE (Venous Thromboembolism) Risk  Recent Flowsheet Documentation  Taken 7/2/2022 0236 by Leanna Last RN  VTE Prevention/Management: bleeding risk factor(s) identified  Range of Motion: active ROM (range of motion) encouraged  Goal: Optimal Comfort and Wellbeing  Outcome: Ongoing, Progressing  Intervention: Provide Person-Centered Care  Recent Flowsheet Documentation  Taken 7/2/2022 0236 by Leanna Last RN  Trust Relationship/Rapport:   care explained   choices provided   thoughts/feelings acknowledged  Goal: Readiness for Transition of Care  Outcome: Ongoing, Progressing     Problem: Fall Injury Risk  Goal: Absence of Fall and Fall-Related Injury  Outcome: Ongoing, Progressing

## 2022-07-02 NOTE — THERAPY EVALUATION
"Acute Care - Speech Language Pathology Initial Evaluation  [facility]   SPEECH LANGUAGE COGNITIVE EVALUATION     Patient Name: Reny Elena  : 1960  MRN: [id number]  Today's Date: 2022             Admit Date: 2022     Reny Elena  is seen this am on  P219/1P to participate in a formal s/l and cognitive evaluation to assess safety/function in adls. Pt is positioned slightly reclined at approx 55 degrees to cooperatively participate. All results and observations of this evaluation are felt to be representative of pt's current functional status. Unable to position fully upright per pt nausea and reported increase in urges to vomit.     She has a PMH significant for HTN, DM, chronic diastolic dysfunction, ESRD on PD, hypothyroidism, and recent diagnosis of anterior falcine SDH during prior admission in 2022.  She presented to [facility] ED for further evaluation and management of persistent nausea and emesis. She reports this has been ongoing for several months, however has significantly increased over the past 2 days prior to admission. She reports unintentional weight loss related to this.     She reports that she fell at home and hit the back of her head. She endorses visual changes since this and that \"my thinking is slow\". She describes visual changes as \"my left eye is fuzzy\".    She notes that talking makes her nausea significantly worse. She is evidenced w/ multiple instances of coughing w/ gagging across this assessment.      Per neurology notes, cryptogenic embolic stroke, unintentional weight loss, fatigue, and persistent nausea is concerning for the possibility of occult malignancy.    Social History     Socioeconomic History   • Marital status:    Tobacco Use   • Smoking status: Never Smoker   • Smokeless tobacco: Never Used   Vaping Use   • Vaping Use: Never used   Substance and Sexual Activity   • Alcohol use: No   • Drug use: No   • Sexual activity: Defer        Diet Orders " "(active) (From admission, onward)     Start     Ordered    07/02/22 0228  Diet Regular; Cardiac, Consistent Carbohydrate  Diet Effective Now         07/02/22 0227                She is observed on room air w/o complications.     Receptive language skills are intact. Pt is able to id common objects and personal body parts, follow simple and multi-step directives, understand complex \"wh\" questions and participate in conversational exchange w/o difficulties.    Expressive language skills are intact. Pt is able to complete automatic speech tasks, confrontation naming tasks, complete complex oe sentences, respond to complet oe \"wh\" questions, repeat at word/sentence and multiple digit levels, as well as participate in complex conversational exchange. No aphasia, anomia or verbal apraxia evidenced.    Pt is independently oriented to person, place and time. Immediate, STM and LTM are wfl w/ pt recalling recent adls and providing personal information w/o delays. Thought organization, processing and problem solving are mildly impacted w/ pt demonstrating appropriate comparing/contrasting, convergent and divergent thinking skills, however she demonstrates mild difficulty pertaining to idiomatic language, sequencing, and abstract reasoning.     Facial/oral structures are symmetrical upon observation. Oral mucosa are moist, pink and clean. Secretions are clear, thin and well controlled. OROM/INNA is wfl w/o lingual deviation upon protrusion. Speech intensity, clarity and intelligibility are wfl w/o dysarthria or oral apraxia noted.    Graphic and reading skills are intact. Pt is able to id isolated letters, simple and moderate words, as well as sentences and small paragraphs when provided in a large enough font size.  She reports that her L eye makes \"everything look fuzzy\". Signature is legible. She is noted w/ decreased peripheral vision to L side as compared to R.     Pragmatic skills are wfl w/ appropriate eye gaze patterns " and visual tracking. Language is appropriate in context w/ topic initiation and maintenance independently. No left neglect evidenced. Humor response is intact w/ appropriate affect.    Visit Dx:    ICD-10-CM ICD-9-CM   1. Cerebrovascular accident (CVA), unspecified mechanism (formerly [facility])  I63.9 434.91     Patient Active Problem List   Diagnosis   • Tibia/fibula fracture   • Iron deficiency anemia   • Type 2 diabetes mellitus with hyperglycemia, with long-term current use of insulin (formerly [facility])   • Wound of right ankle   • Cellulitis   • Metabolic encephalopathy   • History of non-ST elevation myocardial infarction (NSTEMI)   • CKD (chronic kidney disease) stage 3, GFR 30-59 ml/min (formerly [facility])   • Elevated troponin   • HTN (hypertension)   • CVA (cerebral vascular accident) (formerly [facility])   • Chronic diastolic CHF (congestive heart failure) (formerly [facility])   • Decubitus ulcer of coccyx, unspecified pressure ulcer stage   • Depression   • Expressive aphasia   • Impaired mobility and ADLs   • Renal failure   • Hyperkalemia   • Subdural hematoma (formerly [facility])   • Severe malnutrition (formerly [facility])   • Cerebrovascular accident (CVA), unspecified mechanism (formerly [facility])     Past Medical History:   Diagnosis Date   • Arthritis    • Closed fracture of right fibula and tibia 12/25/2018   • Depression    • Diabetic ulcer of left foot associated with type 2 diabetes mellitus (formerly [facility]) 6/23/2017   • Diastolic dysfunction    • Disease of thyroid gland    • Elevated cholesterol    • End stage renal disease (formerly [facility])    • Essential hypertension    • GERD (gastroesophageal reflux disease)    • History of transfusion    • Hyperlipidemia    • Iron deficiency anemia 12/30/2018   • PAD (peripheral artery disease) (formerly [facility])    • Renal failure    • Type 2 diabetes mellitus with hyperglycemia, with long-term current use of insulin (formerly [facility]) 12/30/2018     Past Surgical History:   Procedure Laterality Date   • ABDOMINAL SURGERY     • BACK SURGERY      Post spinal diskectomy, osteophytectomy in one lumbar interspace  "  • CATARACT EXTRACTION      Left    •  SECTION     • DENTAL PROCEDURE     • ENDOSCOPY     • FRACTURE SURGERY      right leg   • HYSTERECTOMY     • INCISION AND DRAINAGE LEG Left 4/15/2017    Procedure: INCISION AND DRAINAGE LOWER EXTREMITY;  Surgeon: Basilio Morris MD;  Location: [facility] OR;  Service:    • INCISION AND DRAINAGE LEG Left 2017    Procedure: Irrigation and Debridment abcess diabetic wound left foot with deep culture;  Surgeon: Basilio Morris MD;  Location: [facility] OR;  Service:    • INSERTION PERITONEAL DIALYSIS CATHETER N/A 2021    Procedure: INSERTION PERITONEAL DIALYSIS CATHETER LAPAROSCOPIC;  Surgeon: Edy Glover MD;  Location: [facility] OR;  Service: General;  Laterality: N/A;   • KNEE ARTHROSCOPY Right    • ORIF TIBIA FRACTURE Right 2018    Procedure: TIBIAL  FRACTURE OPEN REDUCTION INTERNAL FIXATION;  Surgeon: uJng Barragan MD;  Location: [facility] OR;  Service: Orthopedics   • TOE AMPUTATION Right     5th   • TUBAL ABDOMINAL LIGATION         IMPRESSION:  WFL receptive and expressive language skills and mildly impacted cognitive skills and reading skills. She demonstrates mild difficulty pertaining to idiomatic language, sequencing, and abstract reasoning.  She additionally notes  that her L eye makes \"everything look fuzzy\" and decreased peripheral vision to L side as compared to R.      SLP Recommendation and Plan       Recommendations: SLP to f/u for S/L/C re-assessment and potential formal cognitive tx pending pt ability to functionally participate 2/2 NV.     D/w pt results and recommendations w/ verbal understanding and agreement.    D/w RN results and recommendations w/ verbal understanding and agreement.     Thank you for allowing me to participate in the care of your patient-  Brie Solitario MS CCC-SLP          EDUCATION  The patient has been educated in the following areas:     Cognitive Impairment Communication Impairment.  Time Calculation:         Therapy " "Charges for Today     Code Description Service Date Service Provider Modifiers Qty    [id number]  ST EVAL SPEECH AND PROD W LANG  4 2022 Altaf Brie, MS CCC-SLP GN 1    [id number] HC ST EVAL ORAL PHARYNG SWALLOW 4 2022 Altaf Brie, MS CCC-SLP GN 1        Brie MS Altaf CCC-SLP  2022 and           Acute Care - Speech Language Pathology   Swallow Initial Evaluation [facility]   CLINICAL DYSPHAGIA ASSESSMENT     Patient Name: Reny Elena  : 1960  MRN: [id number]  Today's Date: 2022             Admit Date: 2022    Reny Elena  is seen at bedside this am on PCU-219 to assess safety/efficacy of swallowing fnx, determine safest/least restrictive diet tolerance. Pt is positioned slightly reclined at approx 55 degrees to cooperatively participate. All results and observations of this evaluation are felt to be representative of pt's current functional status. Unable to position fully upright per pt nausea and reported increase in urges to vomit.     She has a PMH significant for HTN, DM, chronic diastolic dysfunction, ESRD on PD, hypothyroidism, and recent diagnosis of anterior falcine SDH during prior admission in 2022.  She presented to [facility] ED for further evaluation and management of persistent nausea and emesis. She reports this has been ongoing for several months, however has significantly increased over the past 2 days prior to admission. She reports unintentional weight loss related to this.     She reports that she fell at home and hit the back of her head. She endorses visual changes since this and that \"my thinking is slow\". She describes visual changes as \"my left eye is fuzzy\".    She notes that talking makes her nausea significantly worse. She is evidenced w/ multiple instances of coughing w/ gagging across this assessment.      She reports no difficulty w/ swallowing or any s/s concerning for reflux. She reports that \"I just don't want to eat because I'm nauseous.\" She " reports this as occurring primarily w/ solids.     Social History     Socioeconomic History   • Marital status:    Tobacco Use   • Smoking status: Never Smoker   • Smokeless tobacco: Never Used   Vaping Use   • Vaping Use: Never used   Substance and Sexual Activity   • Alcohol use: No   • Drug use: No   • Sexual activity: Defer      MRI Brain WO     INDICATION:   Weakness, confusion with history of subdural hematoma     TECHNIQUE:   MRI of the brain without IV contrast.     COMPARISON:    CT head May 12, 2022 and July 1, 2022, MRI brain 11 2022     FINDINGS:     Small patchy acute versus early subacute infarcts involving the left frontal white matter (approximately 10 mm rounded focus) and with a few very small foci of acute versus subacute infarct in the left cerebellar hemisphere.      Previously seen subdural hematoma is no longer visualized.. No mass or mass effect is identified. No hydrocephalus. Basal cisterns are not effaced.     There are scattered T2/FLAIR signal hyperintensities within the bilateral cerebral and deep white matter which are nonspecific but most commonly associated with chronic microvascular ischemic change. These are fairly extensive. Numerous old lacunar  infarcts in the basal ganglia on both sides, worse on the left.     Midline structures are within normal limits.     The major intracranial flow voids are maintained.     Left lens replacement.     Mild mucosal thickening scattered throughout the paranasal sinuses, possibly with air-fluid level in the sphenoid sinuses.     Calvarial marrow signal is within normal limits.     IMPRESSION:  1.  A few small patchy foci of acute versus early subacute infarct involving the left frontal lobe and left cerebellar hemisphere.  2.  Previously seen subdural hematomas no longer visualized.  3.  Extensive presumed chronic microvascular ischemic changes with multiple old lacunar infarcts.     Signer Name: ALTA WILLIAMSON MD   Signed: 7/1/2022 8:49  PM   Workstation Name: [email or web address]  ------------------------------------------------------------------------------------------------------------------    EXAM: CT ABDOMEN PELVIS WO CONTRAST-      TECHNIQUE: Multiple axial CT images were obtained from lung bases  through pubic symphysis WITH administration of IV contrast. Reformatted  images in the coronal and/or sagittal plane(s) were generated from the  axial data set to facilitate diagnostic accuracy and/or surgical  planning.  Oral Contrast:NONE.     Radiation dose reduction techniques were utilized per ALARA protocol.  Automated exposure control was initiated through either or [employer] or  [employer] software packages by  protocol.    DOSE: 271.21 mGy.cm     Clinical information vomiting      Comparison 03/28/2022     FINDINGS:     Lower thorax: Clear. No effusions.     Abdomen:     Liver: Homogeneous. No focal hepatic mass or ductal dilatation.     Gallbladder: No dilation or stone identified.     Pancreas: Unremarkable. No mass or ductal dilatation.     Spleen: Homogeneous. No splenomegaly.     Adrenals: No mass.     Kidneys/ureters: No mass. No obstructive uropathy.  No evidence of  urolithiasis.     GI tract: Moderate volume stool in the colon. There is no evidence of  appendicitis     MESENTERY: Free fluid in the abdomen. There are small volume ascites or  dialysate     Vasculature: Evidence of atherosclerotic vascular disease     Abdominal wall: Percutaneous dialysis catheter enters in the right  anterior abdomen     Bladder: No focal mass or significant wall thickening     Reproductive: Unremarkable as visualized     Bones: No acute bony abnormality.     IMPRESSION:     1. Small volume ascites or dialysate. Patient has percutaneous dialysis  catheter.     2.No evidence of bowel obstruction  3. Other findings as above     This report was finalized on 7/1/2022 6:26 PM by Dr. Boris Caballero,  MD.  -------------------------------------------------------------------------------------------------------------    XR CHEST 1 VW-     CLINICAL INDICATION: Chest pain protocol     COMPARISON: 05/11/2022      TECHNIQUE: Single frontal view of the chest.     FINDINGS:      LUNGS: Lungs are adequately aerated.      HEART AND MEDIASTINUM: Heart and mediastinal contours are unremarkable     SKELETON: Bony and soft tissue structures are unremarkable.     IMPRESSION:  No radiographic evidence of acute cardiac or pulmonary disease.     This report was finalized on 7/1/2022 5:33 PM by Dr. Boris Caballero MD.    Diet Orders (active) (From admission, onward)     Start     Ordered    07/02/22 0228  Diet Regular; Cardiac, Consistent Carbohydrate  Diet Effective Now         07/02/22 0227                She is observed on room air w/o complications.     Pt is positioned slightly reclined at 50 degrees and centered in bed to accept multiple po presentations of ice chips and thin liquids via spoon, cup and straw.  Pt is able to self feed. She declines solid cracker and puree trials 2/2 NV and requests bed not be raised above current position 2/2 NV as well.     Facial/oral structures are symmetrical upon observation. Lingual protrusion reveals no deviation. Oral mucosa are moist, pink, and clean. She is edentulous at baseline. Secretions are clear, thin, and well controlled. OROM/INNA is wfl to imitate oral postures. Gag is not assessed. Volitional cough is intact w/ adequate intensity, clear in quality, non-productive. Voice is adequate in intensity, clear in quality w/ intelligible speech.    Upon po presentations, adequate bolus anticipation and acceptance w/ good labial seal for bolus clearance via spoon bowl, cup rim stability and suction via straw. Bolus formation, manipulation and control are wfl w/ rotary mastication pattern. A-p transit is timely w/o oral residue.     Pharyngeal swallow is timely w/ adequate hyolaryngeal  elevation per palpation. No overt s/s aspiration evidenced across this evaluation. No silent aspiration suspected as pt is w/o changes in vocal quality, respirations or secretions post po presentations. Pt denies odynophagia.      Visit Dx:     ICD-10-CM ICD-9-CM   1. Cerebrovascular accident (CVA), unspecified mechanism (formerly [facility])  I63.9 434.91     Patient Active Problem List   Diagnosis   • Tibia/fibula fracture   • Iron deficiency anemia   • Type 2 diabetes mellitus with hyperglycemia, with long-term current use of insulin (formerly [facility])   • Wound of right ankle   • Cellulitis   • Metabolic encephalopathy   • History of non-ST elevation myocardial infarction (NSTEMI)   • CKD (chronic kidney disease) stage 3, GFR 30-59 ml/min (formerly [facility])   • Elevated troponin   • HTN (hypertension)   • CVA (cerebral vascular accident) (formerly [facility])   • Chronic diastolic CHF (congestive heart failure) (formerly [facility])   • Decubitus ulcer of coccyx, unspecified pressure ulcer stage   • Depression   • Expressive aphasia   • Impaired mobility and ADLs   • Renal failure   • Hyperkalemia   • Subdural hematoma (formerly [facility])   • Severe malnutrition (formerly [facility])   • Cerebrovascular accident (CVA), unspecified mechanism (formerly [facility])     Past Medical History:   Diagnosis Date   • Arthritis    • Closed fracture of right fibula and tibia 12/25/2018   • Depression    • Diabetic ulcer of left foot associated with type 2 diabetes mellitus (formerly [facility]) 6/23/2017   • Diastolic dysfunction    • Disease of thyroid gland    • Elevated cholesterol    • End stage renal disease (formerly [facility])    • Essential hypertension    • GERD (gastroesophageal reflux disease)    • History of transfusion    • Hyperlipidemia    • Iron deficiency anemia 12/30/2018   • PAD (peripheral artery disease) (formerly [facility])    • Renal failure    • Type 2 diabetes mellitus with hyperglycemia, with long-term current use of insulin (formerly [facility]) 12/30/2018     Past Surgical History:   Procedure Laterality Date   • ABDOMINAL SURGERY     • BACK SURGERY      Post spinal  diskectomy, osteophytectomy in one lumbar interspace   • CATARACT EXTRACTION      Left    •  SECTION     • DENTAL PROCEDURE     • ENDOSCOPY     • FRACTURE SURGERY      right leg   • HYSTERECTOMY     • INCISION AND DRAINAGE LEG Left 4/15/2017    Procedure: INCISION AND DRAINAGE LOWER EXTREMITY;  Surgeon: Basilio Morris MD;  Location: [facility] OR;  Service:    • INCISION AND DRAINAGE LEG Left 2017    Procedure: Irrigation and Debridment abcess diabetic wound left foot with deep culture;  Surgeon: Basilio Morris MD;  Location: [facility] OR;  Service:    • INSERTION PERITONEAL DIALYSIS CATHETER N/A 2021    Procedure: INSERTION PERITONEAL DIALYSIS CATHETER LAPAROSCOPIC;  Surgeon: Edy Glover MD;  Location: [facility] OR;  Service: General;  Laterality: N/A;   • KNEE ARTHROSCOPY Right    • ORIF TIBIA FRACTURE Right 2018    Procedure: TIBIAL  FRACTURE OPEN REDUCTION INTERNAL FIXATION;  Surgeon: Jung Barragan MD;  Location: [facility] OR;  Service: Orthopedics   • TOE AMPUTATION Right     5th   • TUBAL ABDOMINAL LIGATION         IMPRESSION:  Pt presents w/ wfl oropharyngeal swallow w/o s/s aspiration. No s/s indicative of silent aspiration.  No odynophagia reported.  She is felt to most benefit from a continued least restrictive po diet of regular solids and thin liquids.       SLP Recommendation and Plan       Recommendations:  1. Continue regular solids, thin liquids   2. Meds whole in puree/thins.   3. Upright and centered for all po intake  4. FARZANEH precautions.  5. Oral care protocol.  6. Universal aspiration precautions.     No further formal SLP f/u warranted at this time.    D/w pt results and recommendations w/ verbal agreement.    D/w RN results and recommendations w/ verbal agreement.    Thank you for allowing me to participate in the care of your patient-  Brie Solitario MS CCC-SLP          Patient was not wearing a face mask during this therapy encounter. Therapist used appropriate personal  protective equipment including mask, eye protection and gloves.  Mask used was standard procedure mask. Appropriate PPE was worn during the entire therapy session. The patient coughed during this evaluation. Therapist was within 6 feet for 15 minutes or more during the evaluation. Hand hygiene was completed before and after therapy session. Patient is not in enhanced droplet precautions.        EDUCATION  The patient has been educated in the following areas:   Dysphagia (Swallowing Impairment).              Time Calculation:       Therapy Charges for Today     Code Description Service Date Service Provider Modifiers Qty    [id number]  ST EVAL SPEECH AND PROD W LANG  4 7/2/2022 Brie Solitario MS CCC-SLP GN 1    [id number]  ST EVAL ORAL PHARYNG SWALLOW 4 7/2/2022 Brie Solitario MS CCC-SLP GN 1               Brie Solitario MS CCC-SLP  7/2/2022

## 2022-07-03 ENCOUNTER — APPOINTMENT (OUTPATIENT)
Dept: GENERAL RADIOLOGY | Facility: HOSPITAL | Age: 62
End: 2022-07-03

## 2022-07-03 LAB
ALBUMIN SERPL-MCNC: 2.45 G/DL (ref 3.5–5.2)
ALBUMIN/GLOB SERPL: 1.3 G/DL
ALP SERPL-CCNC: 63 U/L (ref 39–117)
ALT SERPL W P-5'-P-CCNC: 11 U/L (ref 1–33)
ANION GAP SERPL CALCULATED.3IONS-SCNC: 13.3 MMOL/L (ref 5–15)
ANION GAP SERPL CALCULATED.3IONS-SCNC: 8.8 MMOL/L (ref 5–15)
AST SERPL-CCNC: 16 U/L (ref 1–32)
BASOPHILS # BLD AUTO: 0.03 10*3/MM3 (ref 0–0.2)
BASOPHILS # BLD AUTO: 0.03 10*3/MM3 (ref 0–0.2)
BASOPHILS NFR BLD AUTO: 0.4 % (ref 0–1.5)
BASOPHILS NFR BLD AUTO: 0.4 % (ref 0–1.5)
BILIRUB SERPL-MCNC: 0.3 MG/DL (ref 0–1.2)
BUN SERPL-MCNC: 43 MG/DL (ref 8–23)
BUN SERPL-MCNC: 48 MG/DL (ref 8–23)
BUN/CREAT SERPL: 12.6 (ref 7–25)
BUN/CREAT SERPL: 12.6 (ref 7–25)
CALCIUM SPEC-SCNC: 7.8 MG/DL (ref 8.6–10.5)
CALCIUM SPEC-SCNC: 8.1 MG/DL (ref 8.6–10.5)
CHLORIDE SERPL-SCNC: 101 MMOL/L (ref 98–107)
CHLORIDE SERPL-SCNC: 98 MMOL/L (ref 98–107)
CO2 SERPL-SCNC: 26.7 MMOL/L (ref 22–29)
CO2 SERPL-SCNC: 27.2 MMOL/L (ref 22–29)
CREAT SERPL-MCNC: 3.4 MG/DL (ref 0.57–1)
CREAT SERPL-MCNC: 3.82 MG/DL (ref 0.57–1)
D-LACTATE SERPL-SCNC: 1.6 MMOL/L (ref 0.5–2)
D-LACTATE SERPL-SCNC: 2.6 MMOL/L (ref 0.5–2)
DEPRECATED RDW RBC AUTO: 45.1 FL (ref 37–54)
DEPRECATED RDW RBC AUTO: 45.2 FL (ref 37–54)
EGFRCR SERPLBLD CKD-EPI 2021: 12.6 ML/MIN/1.73
EGFRCR SERPLBLD CKD-EPI 2021: 14.5 ML/MIN/1.73
EOSINOPHIL # BLD AUTO: 0.02 10*3/MM3 (ref 0–0.4)
EOSINOPHIL # BLD AUTO: 0.15 10*3/MM3 (ref 0–0.4)
EOSINOPHIL NFR BLD AUTO: 0.2 % (ref 0.3–6.2)
EOSINOPHIL NFR BLD AUTO: 1.9 % (ref 0.3–6.2)
ERYTHROCYTE [DISTWIDTH] IN BLOOD BY AUTOMATED COUNT: 13.7 % (ref 12.3–15.4)
ERYTHROCYTE [DISTWIDTH] IN BLOOD BY AUTOMATED COUNT: 13.8 % (ref 12.3–15.4)
GLOBULIN UR ELPH-MCNC: 1.9 GM/DL
GLUCOSE BLDC GLUCOMTR-MCNC: 161 MG/DL (ref 70–130)
GLUCOSE BLDC GLUCOMTR-MCNC: 190 MG/DL (ref 70–130)
GLUCOSE BLDC GLUCOMTR-MCNC: 259 MG/DL (ref 70–130)
GLUCOSE BLDC GLUCOMTR-MCNC: 263 MG/DL (ref 70–130)
GLUCOSE BLDC GLUCOMTR-MCNC: 269 MG/DL (ref 70–130)
GLUCOSE BLDC GLUCOMTR-MCNC: 40 MG/DL (ref 70–130)
GLUCOSE SERPL-MCNC: 233 MG/DL (ref 65–99)
GLUCOSE SERPL-MCNC: 264 MG/DL (ref 65–99)
HCT VFR BLD AUTO: 26.7 % (ref 34–46.6)
HCT VFR BLD AUTO: 30.4 % (ref 34–46.6)
HGB BLD-MCNC: 8.5 G/DL (ref 12–15.9)
HGB BLD-MCNC: 9.8 G/DL (ref 12–15.9)
IMM GRANULOCYTES # BLD AUTO: 0.02 10*3/MM3 (ref 0–0.05)
IMM GRANULOCYTES # BLD AUTO: 0.03 10*3/MM3 (ref 0–0.05)
IMM GRANULOCYTES NFR BLD AUTO: 0.3 % (ref 0–0.5)
IMM GRANULOCYTES NFR BLD AUTO: 0.4 % (ref 0–0.5)
IRON 24H UR-MRATE: 56 MCG/DL (ref 37–145)
IRON SATN MFR SERPL: 38 % (ref 20–50)
LYMPHOCYTES # BLD AUTO: 1.59 10*3/MM3 (ref 0.7–3.1)
LYMPHOCYTES # BLD AUTO: 1.78 10*3/MM3 (ref 0.7–3.1)
LYMPHOCYTES NFR BLD AUTO: 20.3 % (ref 19.6–45.3)
LYMPHOCYTES NFR BLD AUTO: 21.5 % (ref 19.6–45.3)
MCH RBC QN AUTO: 28.8 PG (ref 26.6–33)
MCH RBC QN AUTO: 29 PG (ref 26.6–33)
MCHC RBC AUTO-ENTMCNC: 31.8 G/DL (ref 31.5–35.7)
MCHC RBC AUTO-ENTMCNC: 32.2 G/DL (ref 31.5–35.7)
MCV RBC AUTO: 89.9 FL (ref 79–97)
MCV RBC AUTO: 90.5 FL (ref 79–97)
MONOCYTES # BLD AUTO: 0.41 10*3/MM3 (ref 0.1–0.9)
MONOCYTES # BLD AUTO: 0.52 10*3/MM3 (ref 0.1–0.9)
MONOCYTES NFR BLD AUTO: 5.2 % (ref 5–12)
MONOCYTES NFR BLD AUTO: 6.3 % (ref 5–12)
NEUTROPHILS NFR BLD AUTO: 5.63 10*3/MM3 (ref 1.7–7)
NEUTROPHILS NFR BLD AUTO: 5.9 10*3/MM3 (ref 1.7–7)
NEUTROPHILS NFR BLD AUTO: 71.2 % (ref 42.7–76)
NEUTROPHILS NFR BLD AUTO: 71.9 % (ref 42.7–76)
NRBC BLD AUTO-RTO: 0 /100 WBC (ref 0–0.2)
NRBC BLD AUTO-RTO: 0 /100 WBC (ref 0–0.2)
PHOSPHATE SERPL-MCNC: 4.7 MG/DL (ref 2.5–4.5)
PLATELET # BLD AUTO: 137 10*3/MM3 (ref 140–450)
PLATELET # BLD AUTO: 138 10*3/MM3 (ref 140–450)
PMV BLD AUTO: 10.5 FL (ref 6–12)
PMV BLD AUTO: 9.7 FL (ref 6–12)
POTASSIUM SERPL-SCNC: 3.3 MMOL/L (ref 3.5–5.2)
POTASSIUM SERPL-SCNC: 3.5 MMOL/L (ref 3.5–5.2)
PROCALCITONIN SERPL-MCNC: 0.27 NG/ML (ref 0–0.25)
PROT SERPL-MCNC: 4.3 G/DL (ref 6–8.5)
QT INTERVAL: 444 MS
QTC INTERVAL: 555 MS
RBC # BLD AUTO: 2.95 10*6/MM3 (ref 3.77–5.28)
RBC # BLD AUTO: 3.38 10*6/MM3 (ref 3.77–5.28)
SODIUM SERPL-SCNC: 134 MMOL/L (ref 136–145)
SODIUM SERPL-SCNC: 141 MMOL/L (ref 136–145)
T4 FREE SERPL-MCNC: 1.43 NG/DL (ref 0.93–1.7)
TIBC SERPL-MCNC: 149 MCG/DL (ref 298–536)
TRANSFERRIN SERPL-MCNC: 100 MG/DL (ref 200–360)
WBC NRBC COR # BLD: 7.83 10*3/MM3 (ref 3.4–10.8)
WBC NRBC COR # BLD: 8.28 10*3/MM3 (ref 3.4–10.8)

## 2022-07-03 PROCEDURE — 83605 ASSAY OF LACTIC ACID: CPT | Performed by: STUDENT IN AN ORGANIZED HEALTH CARE EDUCATION/TRAINING PROGRAM

## 2022-07-03 PROCEDURE — 85025 COMPLETE CBC W/AUTO DIFF WBC: CPT | Performed by: STUDENT IN AN ORGANIZED HEALTH CARE EDUCATION/TRAINING PROGRAM

## 2022-07-03 PROCEDURE — 84100 ASSAY OF PHOSPHORUS: CPT | Performed by: PHYSICIAN ASSISTANT

## 2022-07-03 PROCEDURE — 84439 ASSAY OF FREE THYROXINE: CPT | Performed by: STUDENT IN AN ORGANIZED HEALTH CARE EDUCATION/TRAINING PROGRAM

## 2022-07-03 PROCEDURE — 99233 SBSQ HOSP IP/OBS HIGH 50: CPT | Performed by: STUDENT IN AN ORGANIZED HEALTH CARE EDUCATION/TRAINING PROGRAM

## 2022-07-03 PROCEDURE — 63710000001 INSULIN ASPART PER 5 UNITS: Performed by: INTERNAL MEDICINE

## 2022-07-03 PROCEDURE — 71045 X-RAY EXAM CHEST 1 VIEW: CPT | Performed by: RADIOLOGY

## 2022-07-03 PROCEDURE — 84466 ASSAY OF TRANSFERRIN: CPT | Performed by: STUDENT IN AN ORGANIZED HEALTH CARE EDUCATION/TRAINING PROGRAM

## 2022-07-03 PROCEDURE — 94799 UNLISTED PULMONARY SVC/PX: CPT

## 2022-07-03 PROCEDURE — 94761 N-INVAS EAR/PLS OXIMETRY MLT: CPT

## 2022-07-03 PROCEDURE — 36556 INSERT NON-TUNNEL CV CATH: CPT | Performed by: STUDENT IN AN ORGANIZED HEALTH CARE EDUCATION/TRAINING PROGRAM

## 2022-07-03 PROCEDURE — 76937 US GUIDE VASCULAR ACCESS: CPT | Performed by: STUDENT IN AN ORGANIZED HEALTH CARE EDUCATION/TRAINING PROGRAM

## 2022-07-03 PROCEDURE — 83540 ASSAY OF IRON: CPT | Performed by: STUDENT IN AN ORGANIZED HEALTH CARE EDUCATION/TRAINING PROGRAM

## 2022-07-03 PROCEDURE — 80053 COMPREHEN METABOLIC PANEL: CPT | Performed by: STUDENT IN AN ORGANIZED HEALTH CARE EDUCATION/TRAINING PROGRAM

## 2022-07-03 PROCEDURE — 02HV33Z INSERTION OF INFUSION DEVICE INTO SUPERIOR VENA CAVA, PERCUTANEOUS APPROACH: ICD-10-PCS | Performed by: STUDENT IN AN ORGANIZED HEALTH CARE EDUCATION/TRAINING PROGRAM

## 2022-07-03 PROCEDURE — 82962 GLUCOSE BLOOD TEST: CPT

## 2022-07-03 PROCEDURE — 71045 X-RAY EXAM CHEST 1 VIEW: CPT

## 2022-07-03 PROCEDURE — 84145 PROCALCITONIN (PCT): CPT | Performed by: STUDENT IN AN ORGANIZED HEALTH CARE EDUCATION/TRAINING PROGRAM

## 2022-07-03 RX ORDER — NIFEDIPINE 30 MG/1
30 TABLET, FILM COATED, EXTENDED RELEASE ORAL
Status: DISCONTINUED | OUTPATIENT
Start: 2022-07-03 | End: 2022-07-03

## 2022-07-03 RX ORDER — NOREPINEPHRINE BIT/0.9 % NACL 8 MG/250ML
INFUSION BOTTLE (ML) INTRAVENOUS
Status: COMPLETED
Start: 2022-07-03 | End: 2022-07-03

## 2022-07-03 RX ORDER — SODIUM CHLORIDE 0.9 % (FLUSH) 0.9 %
10 SYRINGE (ML) INJECTION AS NEEDED
Status: DISCONTINUED | OUTPATIENT
Start: 2022-07-03 | End: 2022-07-10 | Stop reason: HOSPADM

## 2022-07-03 RX ORDER — MIDODRINE HYDROCHLORIDE 2.5 MG/1
10 TABLET ORAL ONCE
Status: COMPLETED | OUTPATIENT
Start: 2022-07-03 | End: 2022-07-03

## 2022-07-03 RX ORDER — PHENYLEPHRINE HCL IN 0.9% NACL 1 MG/10 ML
40 SYRINGE (ML) INTRAVENOUS ONCE
Status: COMPLETED | OUTPATIENT
Start: 2022-07-03 | End: 2022-07-03

## 2022-07-03 RX ORDER — SODIUM CHLORIDE 0.9 % (FLUSH) 0.9 %
10 SYRINGE (ML) INJECTION EVERY 12 HOURS SCHEDULED
Status: DISCONTINUED | OUTPATIENT
Start: 2022-07-03 | End: 2022-07-10 | Stop reason: HOSPADM

## 2022-07-03 RX ORDER — NOREPINEPHRINE BIT/0.9 % NACL 8 MG/250ML
.02-.3 INFUSION BOTTLE (ML) INTRAVENOUS
Status: DISCONTINUED | OUTPATIENT
Start: 2022-07-03 | End: 2022-07-10 | Stop reason: HOSPADM

## 2022-07-03 RX ORDER — FLUOXETINE HYDROCHLORIDE 20 MG/1
40 CAPSULE ORAL DAILY
Status: DISCONTINUED | OUTPATIENT
Start: 2022-07-03 | End: 2022-07-10 | Stop reason: HOSPADM

## 2022-07-03 RX ORDER — ASPIRIN 81 MG/1
81 TABLET ORAL DAILY
Status: DISCONTINUED | OUTPATIENT
Start: 2022-07-03 | End: 2022-07-10 | Stop reason: HOSPADM

## 2022-07-03 RX ORDER — SODIUM CHLORIDE 0.9 % (FLUSH) 0.9 %
20 SYRINGE (ML) INJECTION AS NEEDED
Status: DISCONTINUED | OUTPATIENT
Start: 2022-07-03 | End: 2022-07-10 | Stop reason: HOSPADM

## 2022-07-03 RX ORDER — SODIUM CHLORIDE, SODIUM LACTATE, POTASSIUM CHLORIDE, CALCIUM CHLORIDE 600; 310; 30; 20 MG/100ML; MG/100ML; MG/100ML; MG/100ML
75 INJECTION, SOLUTION INTRAVENOUS CONTINUOUS
Status: DISCONTINUED | OUTPATIENT
Start: 2022-07-03 | End: 2022-07-03

## 2022-07-03 RX ORDER — CLONIDINE HYDROCHLORIDE 0.1 MG/1
0.1 TABLET ORAL 3 TIMES DAILY PRN
Status: DISCONTINUED | OUTPATIENT
Start: 2022-07-03 | End: 2022-07-08

## 2022-07-03 RX ORDER — CARVEDILOL 3.12 MG/1
3.12 TABLET ORAL 2 TIMES DAILY WITH MEALS
Status: DISCONTINUED | OUTPATIENT
Start: 2022-07-03 | End: 2022-07-03

## 2022-07-03 RX ORDER — CARVEDILOL 6.25 MG/1
12.5 TABLET ORAL 2 TIMES DAILY WITH MEALS
Status: DISCONTINUED | OUTPATIENT
Start: 2022-07-03 | End: 2022-07-03

## 2022-07-03 RX ADMIN — FOLIC ACID 1 MG: 1 TABLET ORAL at 08:04

## 2022-07-03 RX ADMIN — Medication 0.02 MCG/KG/MIN: at 13:46

## 2022-07-03 RX ADMIN — PANTOPRAZOLE SODIUM 40 MG: 40 TABLET, DELAYED RELEASE ORAL at 05:44

## 2022-07-03 RX ADMIN — GABAPENTIN 300 MG: 300 CAPSULE ORAL at 08:03

## 2022-07-03 RX ADMIN — Medication 1 TABLET: at 08:04

## 2022-07-03 RX ADMIN — HYDROCODONE BITARTRATE AND ACETAMINOPHEN 1 TABLET: 10; 325 TABLET ORAL at 08:28

## 2022-07-03 RX ADMIN — FLUOXETINE HYDROCHLORIDE 40 MG: 20 CAPSULE ORAL at 10:26

## 2022-07-03 RX ADMIN — NIFEDIPINE 30 MG: 30 TABLET, EXTENDED RELEASE ORAL at 08:30

## 2022-07-03 RX ADMIN — Medication 10 ML: at 20:14

## 2022-07-03 RX ADMIN — CARBIDOPA AND LEVODOPA 5 MG: 50; 200 TABLET, EXTENDED RELEASE ORAL at 12:13

## 2022-07-03 RX ADMIN — Medication 10 ML: at 08:04

## 2022-07-03 RX ADMIN — INSULIN ASPART 4 UNITS: 100 INJECTION, SOLUTION INTRAVENOUS; SUBCUTANEOUS at 08:03

## 2022-07-03 RX ADMIN — ATORVASTATIN CALCIUM 80 MG: 40 TABLET, FILM COATED ORAL at 20:13

## 2022-07-03 RX ADMIN — Medication 40 MCG: at 12:40

## 2022-07-03 RX ADMIN — SODIUM CHLORIDE, POTASSIUM CHLORIDE, SODIUM LACTATE AND CALCIUM CHLORIDE 75 ML/HR: 600; 310; 30; 20 INJECTION, SOLUTION INTRAVENOUS at 12:40

## 2022-07-03 RX ADMIN — CARVEDILOL 12.5 MG: 6.25 TABLET, FILM COATED ORAL at 08:30

## 2022-07-03 RX ADMIN — Medication 100 MG: at 08:03

## 2022-07-03 RX ADMIN — ROPINIROLE HYDROCHLORIDE 0.5 MG: 0.25 TABLET, FILM COATED ORAL at 08:04

## 2022-07-03 RX ADMIN — LEVOTHYROXINE SODIUM 25 MCG: 50 TABLET ORAL at 05:44

## 2022-07-03 RX ADMIN — Medication 10 ML: at 20:13

## 2022-07-03 RX ADMIN — SODIUM CHLORIDE, POTASSIUM CHLORIDE, SODIUM LACTATE AND CALCIUM CHLORIDE 1000 ML: 600; 310; 30; 20 INJECTION, SOLUTION INTRAVENOUS at 10:47

## 2022-07-03 RX ADMIN — DEXTROSE MONOHYDRATE 25 G: 25 INJECTION, SOLUTION INTRAVENOUS at 11:45

## 2022-07-03 RX ADMIN — CETIRIZINE HYDROCHLORIDE 5 MG: 10 TABLET, FILM COATED ORAL at 08:04

## 2022-07-03 RX ADMIN — ASPIRIN 81 MG: 81 TABLET, COATED ORAL at 08:30

## 2022-07-03 RX ADMIN — ROPINIROLE HYDROCHLORIDE 0.5 MG: 0.25 TABLET, FILM COATED ORAL at 20:13

## 2022-07-03 NOTE — PLAN OF CARE
Goal Outcome Evaluation:  Plan of Care Reviewed With: patient           Outcome Evaluation: Pt started shift alert and oriented, . Scheduled meds given. Pt became hypogylcemic, hypotensive, bradycardic, and lethargic. MD notified. CL placed, 2L of LR, and levo drip started. Pt off levo substaining a SBP >120. Pt is now A&O x3. No acute distress noted at this time.

## 2022-07-03 NOTE — PROCEDURES
"Insert Central Line At Bedside    Date/Time: 7/3/2022 12:29 PM  Performed by: Ari Rosa DO  Authorized by: Ari Rosa DO   Consent: Verbal consent obtained.  Risks and benefits: risks, benefits and alternatives were discussed  Consent given by: daughter.  Patient understanding: patient states understanding of the procedure being performed  Patient consent: the patient's understanding of the procedure matches consent given  Procedure consent: procedure consent matches procedure scheduled  Relevant documents: relevant documents present and verified  Test results: test results available and properly labeled  Site marked: the operative site was marked  Imaging studies: imaging studies available  Required items: required blood products, implants, devices, and special equipment available  Patient identity confirmed: verbally with patient, arm band and provided demographic data  Time out: Immediately prior to procedure a \"time out\" was called to verify the correct patient, procedure, equipment, support staff and site/side marked as required.  Indications: vascular access  Anesthesia: local infiltration    Anesthesia:  Local Anesthetic: lidocaine 1% without epinephrine  Anesthetic total: 10 mL    Sedation:  Patient sedated: no    Preparation: skin prepped with ChloraPrep  Skin prep agent dried: skin prep agent completely dried prior to procedure  Sterile barriers: all five maximum sterile barriers used - cap, mask, sterile gown, sterile gloves, and large sterile sheet  Hand hygiene: hand hygiene performed prior to central venous catheter insertion  Location details: right internal jugular  Patient position: Trendelenburg  Procedural supplies: quad.  Catheter size: 7.5 Fr  Pre-procedure: landmarks identified  Ultrasound guidance: yes  Sterile ultrasound techniques: sterile gel and sterile probe covers were used  Number of attempts: 1  Successful placement: yes  Post-procedure: line sutured and " dressing applied  Assessment: blood return through all ports,  free fluid flow and placement verified by x-ray  Patient tolerance: patient tolerated the procedure well with no immediate complications

## 2022-07-03 NOTE — CONSULTS
Nephrology Consultation Note    Referring Provider: Dr. Hicks  Reason for Consultation: Dialysis    Chief complaint nausea and vomiting    Subjective .     History of present illness: Patient admitted with severe nausea and vomiting that had been going on for a month but was accentuated 3 days prior to admission.  She had also experienced a fall with possible syncope.  In the emergency room she was noted to have on the MRI without contrast, small patchy foci of acute versus early subacute infarct involving the left frontal lobe and left cerebellar hemisphere of uncertain etiology (ischemic versus embolic)    In the hospital patient is experienced extremely labile hypertension.  She has been given multiple medicines to lower her blood pressure and then she is proceeded to drop her blood pressure today thereby requiring a low-dose of Levophed and a 2 L bolus of Ringer lactate    Hemodynamic data reviewed     Patient denies any shortness of breath of blood loss.  Radiologically she is not in CHF on x-ray chest done earlier today.    There is no history of cancer        History  Past Medical History:   Diagnosis Date   • Arthritis    • Closed fracture of right fibula and tibia 12/25/2018   • Depression    • Diabetic ulcer of left foot associated with type 2 diabetes mellitus ([facility]) 6/23/2017   • Diastolic dysfunction    • Disease of thyroid gland    • Elevated cholesterol    • End stage renal disease ([facility])    • Essential hypertension    • GERD (gastroesophageal reflux disease)    • History of transfusion    • Hyperlipidemia    • Iron deficiency anemia 12/30/2018   • PAD (peripheral artery disease) ([facility])    • Renal failure    • Type 2 diabetes mellitus with hyperglycemia, with long-term current use of insulin ([facility]) 12/30/2018   ,   Past Surgical History:   Procedure Laterality Date   • ABDOMINAL SURGERY     • BACK SURGERY      Post spinal diskectomy, osteophytectomy in  one lumbar interspace   • CATARACT EXTRACTION      Left    •  SECTION     • DENTAL PROCEDURE     • ENDOSCOPY     • FRACTURE SURGERY      right leg   • HYSTERECTOMY     • INCISION AND DRAINAGE LEG Left 4/15/2017    Procedure: INCISION AND DRAINAGE LOWER EXTREMITY;  Surgeon: Basilio Morris MD;  Location: [facility] OR;  Service:    • INCISION AND DRAINAGE LEG Left 2017    Procedure: Irrigation and Debridment abcess diabetic wound left foot with deep culture;  Surgeon: Basilio Morris MD;  Location: [facility] OR;  Service:    • INSERTION PERITONEAL DIALYSIS CATHETER N/A 2021    Procedure: INSERTION PERITONEAL DIALYSIS CATHETER LAPAROSCOPIC;  Surgeon: Edy Glover MD;  Location: [facility] OR;  Service: General;  Laterality: N/A;   • KNEE ARTHROSCOPY Right    • ORIF TIBIA FRACTURE Right 2018    Procedure: TIBIAL  FRACTURE OPEN REDUCTION INTERNAL FIXATION;  Surgeon: Jung Barragan MD;  Location: [facility] OR;  Service: Orthopedics   • TOE AMPUTATION Right     5th   • TUBAL ABDOMINAL LIGATION     ,   Family History   Problem Relation Age of Onset   • Heart disease Mother    • Cancer Mother    • COPD Mother    • Diabetes Other    • Depression Other    ,   Social History     Tobacco Use   • Smoking status: Never Smoker   • Smokeless tobacco: Never Used   Vaping Use   • Vaping Use: Never used   Substance Use Topics   • Alcohol use: No   • Drug use: No    and Allergies:  Penicillins, Codeine, and Sulfa antibiotics      Vital Signs   Temp:  [92.7 °F (33.7 °C)-98.3 °F (36.8 °C)] 92.7 °F (33.7 °C)  Heart Rate:  [51-97] 59  Resp:  [10-22] 10  BP: ()/() 109/58    Physical Exam:     General Appearance:    Alert, cooperative, apprehensive but not in distress   Head:    Normocephalic   Eyes:           no pallor, pupils CCERLA       Throat:   oral mucosa moist   Neck:  CVP is about 20 cm       Lungs:     Clear to auscultation,respirations regular, even and                  unlabored    Heart:    Regular  rhythm and normal rate, normal S1 and S2   Chest Wall:    No abnormalities observed   Abdomen:     Normal bowel sounds, no tenderness   Rectal:     Deferred   Extremities:  No edema               Neurologic:   Cr Ns grossly intact, moves all extremities.     Results Review:   I reviewed the patient's new clinical results.      Lab Results (last 24 hours)     Procedure Component Value Units Date/Time    Lactic Acid, Plasma [[id number]]  (Abnormal) Collected: 07/03/22 1445    Specimen: Blood Updated: 07/03/22 1553     Lactate 2.6 mmol/L     Comprehensive Metabolic Panel [[id number]]  (Abnormal) Collected: 07/03/22 1445    Specimen: Blood Updated: 07/03/22 1551     Glucose 233 mg/dL      BUN 43 mg/dL      Creatinine 3.40 mg/dL      Sodium 134 mmol/L      Potassium 3.5 mmol/L      Chloride 98 mmol/L      CO2 27.2 mmol/L      Calcium 7.8 mg/dL      Total Protein 4.3 g/dL      Albumin 2.45 g/dL      ALT (SGPT) 11 U/L      AST (SGOT) 16 U/L      Alkaline Phosphatase 63 U/L      Total Bilirubin 0.3 mg/dL      Globulin 1.9 gm/dL      A/G Ratio 1.3 g/dL      BUN/Creatinine Ratio 12.6     Anion Gap 8.8 mmol/L      eGFR 14.5 mL/min/1.73      Comment: <15 Indicative of kidney failure       Narrative:      GFR Normal >60  Chronic Kidney Disease <60  Kidney Failure <15      CBC & Differential [[id number]]  (Abnormal) Collected: 07/03/22 1445    Specimen: Blood Updated: 07/03/22 1517    Narrative:      The following orders were created for panel order CBC & Differential.  Procedure                               Abnormality         Status                     ---------                               -----------         ------                     CBC Auto Differential[[id number]]        Abnormal            Final result                 Please view results for these tests on the individual orders.    CBC Auto Differential [[id number]]  (Abnormal) Collected: 07/03/22 1445    Specimen: Blood Updated: 07/03/22 1517     WBC 7.83 10*3/mm3      RBC  2.95 10*6/mm3      Hemoglobin 8.5 g/dL      Hematocrit 26.7 %      MCV 90.5 fL      MCH 28.8 pg      MCHC 31.8 g/dL      RDW 13.7 %      RDW-SD 45.1 fl      MPV 10.5 fL      Platelets 137 10*3/mm3      Neutrophil % 71.9 %      Lymphocyte % 20.3 %      Monocyte % 5.2 %      Eosinophil % 1.9 %      Basophil % 0.4 %      Immature Grans % 0.3 %      Neutrophils, Absolute 5.63 10*3/mm3      Lymphocytes, Absolute 1.59 10*3/mm3      Monocytes, Absolute 0.41 10*3/mm3      Eosinophils, Absolute 0.15 10*3/mm3      Basophils, Absolute 0.03 10*3/mm3      Immature Grans, Absolute 0.02 10*3/mm3      nRBC 0.0 /100 WBC     POC Glucose Once [[id number]]  (Abnormal) Collected: 07/03/22 1343    Specimen: Blood Updated: 07/03/22 1350     Glucose 190 mg/dL      Comment: Meter: [id number] : [id number] OUSMANE TONG       POC Glucose Once [[id number]]  (Abnormal) Collected: 07/03/22 1220    Specimen: Blood Updated: 07/03/22 1252     Glucose 161 mg/dL      Comment: Meter: [id number] : [id number] MYRANDA PARRISH       POC Glucose Once [[id number]]  (Abnormal) Collected: 07/03/22 1127    Specimen: Blood Updated: 07/03/22 1149     Glucose 263 mg/dL      Comment: Meter: [id number] : [id number] CIERA IRIZARRY       POC Glucose Once [[id number]]  (Abnormal) Collected: 07/03/22 1116    Specimen: Blood Updated: 07/03/22 1149     Glucose 40 mg/dL      Comment: Result Not Confirmed Meter: [id number] : [id number] CRISTINA VANG       Iron Profile [[id number]]  (Abnormal) Collected: 07/03/22 0022    Specimen: Blood Updated: 07/03/22 1048     Iron 56 mcg/dL      Iron Saturation 38 %      Transferrin 100 mg/dL      TIBC 149 mcg/dL     Procalcitonin [[id number]]  (Abnormal) Collected: 07/03/22 0022    Specimen: Blood Updated: 07/03/22 1037     Procalcitonin 0.27 ng/mL     Narrative:      As a Marker for Sepsis (Non-Neonates):    1. <0.5 ng/mL represents a low risk of severe sepsis and/or septic shock.  2. >2 ng/mL represents a high risk of severe sepsis  "and/or septic shock.    As a Marker for Lower Respiratory Tract Infections that require antibiotic therapy:    PCT on Admission    Antibiotic Therapy       6-12 Hrs later    >0.5                Strongly Recommended  >0.25 - <0.5        Recommended   0.1 - 0.25          Discouraged              Remeasure/reassess PCT  <0.1                Strongly Discouraged     Remeasure/reassess PCT    As 28 day mortality risk marker: \"Change in Procalcitonin Result\" (>80% or <=80%) if Day 0 (or Day 1) and Day 4 values are available. Refer to [email or web address]    Change in PCT <=80%  A decrease of PCT levels below or equal to 80% defines a positive change in PCT test result representing a higher risk for 28-day all-cause mortality of patients diagnosed with severe sepsis for septic shock.    Change in PCT >80%  A decrease of PCT levels of more than 80% defines a negative change in PCT result representing a lower risk for 28-day all-cause mortality of patients diagnosed with severe sepsis or septic shock.       T4, Free [[id number]]  (Normal) Collected: 07/03/22 0022    Specimen: Blood Updated: 07/03/22 1037     Free T4 1.43 ng/dL     Narrative:      Results may be falsely increased if patient taking Biotin.      POC Glucose Once [[id number]]  (Abnormal) Collected: 07/03/22 0547    Specimen: Blood Updated: 07/03/22 0602     Glucose 259 mg/dL      Comment: Meter: [id number] : [id number] CHRISTO KRUEGER       Blood Culture - Blood, Wrist, Right [[id number]]  (Normal) Collected: 07/02/22 0512    Specimen: Blood from Wrist, Right Updated: 07/03/22 0532     Blood Culture No growth at 24 hours    Blood Culture - Blood, Arm, Right [[id number]]  (Normal) Collected: 07/02/22 0512    Specimen: Blood from Arm, Right Updated: 07/03/22 0532     Blood Culture No growth at 24 hours    Basic Metabolic Panel [[id number]]  (Abnormal) Collected: 07/03/22 0022    Specimen: Blood Updated: 07/03/22 0120     Glucose 264 mg/dL      BUN 48 " mg/dL      Creatinine 3.82 mg/dL      Sodium 141 mmol/L      Potassium 3.3 mmol/L      Comment: Slight hemolysis detected by analyzer. Results may be affected.        Chloride 101 mmol/L      CO2 26.7 mmol/L      Calcium 8.1 mg/dL      BUN/Creatinine Ratio 12.6     Anion Gap 13.3 mmol/L      eGFR 12.6 mL/min/1.73      Comment: <15 Indicative of kidney failure       Narrative:      GFR Normal >60  Chronic Kidney Disease <60  Kidney Failure <15      Phosphorus [[id number]]  (Abnormal) Collected: 07/03/22 0022    Specimen: Blood Updated: 07/03/22 0120     Phosphorus 4.7 mg/dL     CBC & Differential [[id number]]  (Abnormal) Collected: 07/03/22 0022    Specimen: Blood Updated: 07/03/22 0101    Narrative:      The following orders were created for panel order CBC & Differential.  Procedure                               Abnormality         Status                     ---------                               -----------         ------                     CBC Auto Differential[[id number]]        Abnormal            Final result                 Please view results for these tests on the individual orders.    CBC Auto Differential [[id number]]  (Abnormal) Collected: 07/03/22 0022    Specimen: Blood Updated: 07/03/22 0101     WBC 8.28 10*3/mm3      RBC 3.38 10*6/mm3      Hemoglobin 9.8 g/dL      Hematocrit 30.4 %      MCV 89.9 fL      MCH 29.0 pg      MCHC 32.2 g/dL      RDW 13.8 %      RDW-SD 45.2 fl      MPV 9.7 fL      Platelets 138 10*3/mm3      Neutrophil % 71.2 %      Lymphocyte % 21.5 %      Monocyte % 6.3 %      Eosinophil % 0.2 %      Basophil % 0.4 %      Immature Grans % 0.4 %      Neutrophils, Absolute 5.90 10*3/mm3      Lymphocytes, Absolute 1.78 10*3/mm3      Monocytes, Absolute 0.52 10*3/mm3      Eosinophils, Absolute 0.02 10*3/mm3      Basophils, Absolute 0.03 10*3/mm3      Immature Grans, Absolute 0.03 10*3/mm3      nRBC 0.0 /100 WBC     POC Glucose Once [[id number]]  (Abnormal) Collected: 07/03/22 0052    Specimen:  Blood Updated: 07/03/22 0101     Glucose 269 mg/dL      Comment: Meter: [id number] : [id number] CHRISTO KRUEGER       POC Glucose Once [[id number]]  (Abnormal) Collected: 07/02/22 1717    Specimen: Blood Updated: 07/02/22 1724     Glucose 181 mg/dL      Comment: Meter: [id number] : [id number] MYRANDA PARRISH             Imaging Results (Last 24 Hours)     Procedure Component Value Units Date/Time    XR Chest 1 View [[id number]] Collected: 07/03/22 1238     Updated: 07/03/22 1240    Narrative:      XR CHEST 1 VW-     CLINICAL INDICATION: confirm central line placement; I63.9-Cerebral  infarction, unspecified        COMPARISON: 07/01/2022      TECHNIQUE: Single frontal view of the chest.     FINDINGS:      LUNGS: Right internal jugular central line tip is at the cavoatrial  junction. No pneumothorax      HEART AND MEDIASTINUM: Heart and mediastinal contours are unremarkable        SKELETON: Bony and soft tissue structures are unremarkable.             Impression:      Central line tip as above     This report was finalized on 7/3/2022 12:38 PM by Dr. Boris Caballero MD.       MRI Cervical Spine Without Contrast [[id number]] Collected: 07/03/22 1119     Updated: 07/03/22 1126    Narrative:      MRI CERVICAL SPINE WO CONTRAST-     CLINICAL INDICATION: frequent falls, r/o myelopathy per neuro;  I63.9-Cerebral infarction, unspecified        COMPARISON: None immediately available     TECHNIQUE: Sagittal spin echo scans were obtained from the posterior  fossa through the upper thoracic spine and axial scans were performed  through selected cervical disc space levels, utilizing both spin echo  and gradient echo scans technique without contrast administration.      FINDINGS:   Alignment is anatomic.   The vertebral body heights are adequately maintained.  The signal characteristics of the vertebral bodies are as expected on  the presented pulse sequences.     Disc space narrowing at C6-7.  Annular disc bulge at C3-4, larger at  C4-5 and central disc herniation  at C5-6.  Flattening of the cerebral spinal fluid space and cord as above. Most  notable is C5-6 where there is a central disc herniation causing  moderate central canal narrowing.     There is no abnormal cord signal.             Impression:      Multiple disc bulges as above. Largest is at C5-6        This report was finalized on 7/3/2022 11:24 AM by Dr. Boris Caballero MD.                       Assessment and Plan:    1.  End-stage renal disease on peritoneal dialysis  2.  Acute CVA left frontal lobe  3.  Severe orthostatic hypotension  4.  Supine hypertension  5.  Type 2 diabetes with neuropathy  6.  Anemia of CKD  7.  Vomiting possible gastroparesis    In the ambulatory setting this patient is often dropped her blood pressure by 60 to 80 mm on standing.  Is very difficult to manage patients like that as they have supine hypertension and very low blood pressures on standing.  We have to compromise.  I will only give her as needed clonidine for systolic blood pressure more than 190.  Other suggestions are welcome.  I would not use multiple medications to control her blood pressure.  We will continue to use 1.5% PD solution as she is not radiologically in CHF and although her CVP is very high, that may reflect the 2 L Ringer lactate infusion that she has received is a bolus shortly before my evaluation.  She does not have edema.    Advised the nurses to give her a reverse Trendelenburg position when her blood pressure gets very high before we try clonidine        Han Sandy MD  07/03/22  16:37 EDT

## 2022-07-03 NOTE — PROGRESS NOTES
Paged due to hypotension. BP >215 earlier this morning. Received home coreg and started on low dose procardia. Became hypotensive, SBP high 60s. She remained AAOx3. I did give po midodrine given significant hypotension. Unable to get peripheral access and CVL placed. 2L bolus given with improvement to SBP 80s remained lethargic despite IV boluses. Repeat SBP 78/45. Will give low dose phenyephrine push 40mcg-for symptomatic hypotension, short acting (10-15min) effect. Closely monitor in pcu, further changes pending course.    Electronically signed by Ari Rosa DO, 07/03/22, 12:34 PM EDT.

## 2022-07-03 NOTE — PLAN OF CARE
Goal Outcome Evaluation:  Plan of Care Reviewed With: patient   Progress: no change     Pt VSS, afebrile, O2 @ 2L NC. Pt started on Oxygen this shift d/t O2 sat dropping to mid-upper 80s during sleep. Cardizem gtt currently off d/t SBP below preferred range, see MAR. Pt has rested well this shift, no S/S of distress noted. Will continue to monitor for remainder of shift.      Problem: Adult Inpatient Plan of Care  Goal: Plan of Care Review  Outcome: Ongoing, Progressing  Flowsheets (Taken 7/3/2022 0435)  Progress: no change  Plan of Care Reviewed With: patient  Goal: Patient-Specific Goal (Individualized)  Outcome: Ongoing, Progressing  Goal: Absence of Hospital-Acquired Illness or Injury  Outcome: Ongoing, Progressing  Intervention: Identify and Manage Fall Risk  Recent Flowsheet Documentation  Taken 7/3/2022 0300 by Leanna Last RN  Safety Promotion/Fall Prevention:   safety round/check completed   activity supervised  Taken 7/3/2022 0100 by Leanna Last RN  Safety Promotion/Fall Prevention:   safety round/check completed   activity supervised  Taken 7/2/2022 2300 by Leanna Last RN  Safety Promotion/Fall Prevention:   safety round/check completed   activity supervised  Taken 7/2/2022 2100 by Leanna Last RN  Safety Promotion/Fall Prevention:   safety round/check completed   activity supervised  Taken 7/2/2022 1900 by Leanna Last RN  Safety Promotion/Fall Prevention:   safety round/check completed   activity supervised  Intervention: Prevent Skin Injury  Recent Flowsheet Documentation  Taken 7/3/2022 0145 by Leanna Last RN  Skin Protection:   tubing/devices free from skin contact   adhesive use limited  Taken 7/2/2022 2020 by Leanna Last RN  Skin Protection:   tubing/devices free from skin contact   adhesive use limited  Intervention: Prevent and Manage VTE (Venous Thromboembolism) Risk  Recent Flowsheet Documentation  Taken 7/3/2022 0145 by Leanna Last RN  VTE Prevention/Management:    bleeding risk factor(s) identified   sequential compression devices off  Taken 7/2/2022 2020 by Leanna Last RN  VTE Prevention/Management:   bleeding risk factor(s) identified   sequential compression devices on  Range of Motion: active ROM (range of motion) encouraged  Goal: Optimal Comfort and Wellbeing  Outcome: Ongoing, Progressing  Intervention: Provide Person-Centered Care  Recent Flowsheet Documentation  Taken 7/3/2022 0145 by Leanna Last RN  Trust Relationship/Rapport:   care explained   choices provided   thoughts/feelings acknowledged  Taken 7/2/2022 2020 by Leanna Last RN  Trust Relationship/Rapport:   care explained   choices provided   thoughts/feelings acknowledged  Goal: Readiness for Transition of Care  Outcome: Ongoing, Progressing     Problem: Fall Injury Risk  Goal: Absence of Fall and Fall-Related Injury  Outcome: Ongoing, Progressing  Intervention: Identify and Manage Contributors  Recent Flowsheet Documentation  Taken 7/3/2022 0300 by Leanna Last RN  Medication Review/Management: medications reviewed  Taken 7/3/2022 0100 by Leanna Last RN  Medication Review/Management: medications reviewed  Taken 7/2/2022 2300 by Leanna aLst RN  Medication Review/Management: medications reviewed  Taken 7/2/2022 2100 by Leanna Last RN  Medication Review/Management: medications reviewed  Taken 7/2/2022 1900 by Leanna Last RN  Medication Review/Management: medications reviewed  Intervention: Promote Injury-Free Environment  Recent Flowsheet Documentation  Taken 7/3/2022 0300 by Leanna Last RN  Safety Promotion/Fall Prevention:   safety round/check completed   activity supervised  Taken 7/3/2022 0100 by Leanna Last RN  Safety Promotion/Fall Prevention:   safety round/check completed   activity supervised  Taken 7/2/2022 2300 by Leanna Last RN  Safety Promotion/Fall Prevention:   safety round/check completed   activity supervised  Taken 7/2/2022 2100 by Leanna Last,  RN  Safety Promotion/Fall Prevention:   safety round/check completed   activity supervised  Taken 7/2/2022 1900 by Leanna Last, RN  Safety Promotion/Fall Prevention:   safety round/check completed   activity supervised

## 2022-07-03 NOTE — NURSING NOTE
Patient glucose checked and was noted to be 40. Attempted to give pt snack at this time and Dextrose IVP per MD order. During this pt was noted to become hypotensive, bradycardic and very lethargic. After attempting 2nd IV access with no success. MD paged regarding all the above mentioned findings. MD to unit to obtain central line and assist in stabilizing pt. See all orders.

## 2022-07-03 NOTE — PROGRESS NOTES
[facility] HOSPITALIST PROGRESS NOTE     Patient Identification:  Name:  Reny Elena  Age:  62 y.o.  Sex:  female  :  1960  MRN:  [id number]  Visit Number:  [id number]  ROOM: [address]     Primary Care Provider:  Susan Stephens APRN    Length of stay in inpatient status:  1    Subjective     Chief Compliant:    Chief Complaint   Patient presents with   • Chest Pain   • Vomiting       History of Presenting Illness:    Patient seen in follow-up for acute/subacute embolic stroke.  She is awake, alert and oriented x3.  Complains of generalized weakness but no focal deficits.  NIHSS 0.  Says she feels better. She is interested in short-term rehab.  No adverse events noted overnight.    Objective     Current Hospital Meds:aspirin, 81 mg, Oral, Daily  atorvastatin, 80 mg, Oral, Nightly  carvedilol, 12.5 mg, Oral, BID With Meals  cetirizine, 5 mg, Oral, Daily  docusate sodium, 100 mg, Oral, BID  FLUoxetine, 40 mg, Oral, Daily  folic acid, 1 mg, Oral, Daily  gabapentin, 300 mg, Oral, Daily  Insulin Aspart, 0-7 Units, Subcutaneous, TID AC  levothyroxine, 25 mcg, Oral, Q AM  multivitamin, 1 tablet, Oral, Daily  NIFEdipine CC, 30 mg, Oral, Q24H  pantoprazole, 40 mg, Oral, Q AM  rOPINIRole, 0.5 mg, Oral, BID  sodium chloride, 10 mL, Intravenous, Q12H  vitamin B-1, 100 mg, Oral, Daily    dilTIAZem, 5-15 mg/hr, Last Rate: Stopped (22)        Current Antimicrobial Therapy:  Anti-Infectives (From admission, onward)    None        Current Diuretic Therapy:  Diuretics (From admission, onward)    None        ----------------------------------------------------------------------------------------------------------------------  Vital Signs:  Temp:  [97.7 °F (36.5 °C)-99.6 °F (37.6 °C)] 97.7 °F (36.5 °C)  Heart Rate:  [] 78  Resp:  [14-24] 22  BP: ()/() 169/80  SpO2:  [87 %-100 %] 97 %  on  Flow (L/min):  [2] 2;   Device (Oxygen Therapy): room air  Body mass index is 16.51  kg/m².    Wt Readings from Last 3 Encounters:   07/03/22 45 kg (99 lb 3.3 oz)   05/19/22 52.6 kg (115 lb 14.4 oz)   05/05/22 50.3 kg (111 lb)     Intake & Output (last 3 days)       06/30 0701 07/01 0700 07/01 0701 07/02 0700 07/02 0701 07/03 0700 07/03 0701 07/04 0700    P.O.   0 120    I.V. (mL/kg)   16.2 (0.4)     Total Intake(mL/kg)   16.2 (0.4) 120 (2.7)    Urine (mL/kg/hr)   0 (0)     Other   249     Total Output   249     Net   -232.9 +120            Urine Unmeasured Occurrence   1 x         Diet Regular; Cardiac, Consistent Carbohydrate  ----------------------------------------------------------------------------------------------------------------------  Physical exam:  Constitutional: Elderly female, nontoxic, Well-developed and well-nourished, resting comfortably in bed, no acute distress.      HENT:  Head:  Normocephalic and atraumatic.  Mouth:  Moist mucous membranes.    No facial droop.  No dysphagia or pressured speech.  Eyes:  Conjunctivae and EOM are normal. No scleral icterus.   Cardiovascular:  Normal rate, regular rhythm and normal heart sounds with no murmur. No JVD.   Pulmonary/Chest:  No respiratory distress, no wheezes, no crackles, with normal breath sounds and good air movement. Unlabored. No accessory muscle use.  Abdominal:  Soft. No distension and no tenderness.  Bowel sounds present. No rebound or guarding.   Musculoskeletal:  No tenderness, and no deformity.  No red or swollen joints anywhere.    Neurological:  Alert and oriented to person, place, and time.  No cranial nerve deficit.  Generalized weakness but no focal deficits appreciated.  Skin:  Skin is warm and dry. No rash noted. No pallor.   Peripheral vascular:  No clubbing, no cyanosis, no edema. Pedal and tibial pulses 2 out of 4 bilaterally.   ----------------------------------------------------------------------------------------------------------------------  Results from last 7 days   Lab Units 07/03/22  0022  07/02/22 0512 07/01/22  1727   CRP mg/dL  --  <0.30  --    WBC 10*3/mm3 8.28 6.62 4.43   HEMOGLOBIN g/dL 9.8* 11.2* 11.8*   HEMATOCRIT % 30.4* 34.1 35.8   MCV fL 89.9 88.6 89.5   MCHC g/dL 32.2 32.8 33.0   PLATELETS 10*3/mm3 138* 152 131*   INR   --   --  0.97     Results from last 7 days   Lab Units 07/01/22  1722   PH, ARTERIAL pH units 7.460*   PO2 ART mm Hg 75.3*   PCO2, ARTERIAL mm Hg 39.6   HCO3 ART mmol/L 28.1*     Results from last 7 days   Lab Units 07/03/22 0022 07/02/22 0512 07/01/22 1931 07/01/22  1727   SODIUM mmol/L 141 142  --  138   POTASSIUM mmol/L 3.3* 3.4*  --  3.8   MAGNESIUM mg/dL  --  1.6 1.5*  --    CHLORIDE mmol/L 101 101  --  100   CO2 mmol/L 26.7 25.1  --  25.4   BUN mg/dL 48* 42*  --  37*   CREATININE mg/dL 3.82* 3.78*  --  3.38*   CALCIUM mg/dL 8.1* 8.7  --  8.6   PHOSPHORUS mg/dL 4.7* 4.0  --   --    GLUCOSE mg/dL 264* 219*  --  206*   ALBUMIN g/dL  --   --   --  3.41*   BILIRUBIN mg/dL  --   --   --  0.5   ALK PHOS U/L  --   --   --  82   AST (SGOT) U/L  --   --   --  22   ALT (SGPT) U/L  --   --   --  16   Estimated Creatinine Clearance: 10.6 mL/min (A) (by C-G formula based on SCr of 3.82 mg/dL (H)).  No results found for: AMMONIA  Results from last 7 days   Lab Units 07/02/22 0512 07/01/22 1931 07/01/22  1727   TROPONIN T ng/mL 0.071* 0.081* 0.085*         Results from last 7 days   Lab Units 07/02/22  0512   CHOLESTEROL mg/dL 202*   TRIGLYCERIDES mg/dL 80   HDL CHOL mg/dL 84*   LDL CHOL mg/dL 104*     Hemoglobin A1C   Date/Time Value Ref Range Status   07/02/2022 0512 7.60 (H) 4.80 - 5.60 % Final     Glucose   Date/Time Value Ref Range Status   07/03/2022 0547 259 (H) 70 - 130 mg/dL Final     Comment:     Meter: [id number] : [id number] CHRISTO KRUEGER   07/03/2022 0052 269 (H) 70 - 130 mg/dL Final     Comment:     Meter: [id number] : [id number] CHRISTO KRUEGER   07/02/2022 1717 181 (H) 70 - 130 mg/dL Final     Comment:     Meter: [id number] : [id number] MYRANDA PARRISH    07/02/2022 1127 120 70 - 130 mg/dL Final     Comment:     Meter: [id number] : [id number] MYRANDA PARRISH   07/02/2022 0622 187 (H) 70 - 130 mg/dL Final     Comment:     Meter: [id number] : [id number] CHRISTO KRUEGER   07/01/2022 1837 175 (H) 70 - 130 mg/dL Final     Comment:     Meter: [id number] : [id number] Rajani Zaragoza     Lab Results   Component Value Date    TSH 7.680 (H) 07/01/2022    FREET4 1.05 05/11/2022     No results found for: PREGTESTUR, PREGSERUM, HCG, HCGQUANT  Pain Management Panel     Pain Management Panel Latest Ref Rng & Units 5/11/2022 11/6/2021    CREATININE UR mg/dL - 24.4    AMPHETAMINES SCREEN, URINE Negative Negative -    BARBITURATES SCREEN Negative Negative -    BENZODIAZEPINE SCREEN, URINE Negative Positive(A) -    BUPRENORPHINEUR Negative Negative -    COCAINE SCREEN, URINE Negative Negative -    METHADONE SCREEN, URINE Negative Negative -    METHAMPHETAMINEUR Negative Negative -        Brief Urine Lab Results  (Last result in the past 365 days)      Color   Clarity   Blood   Leuk Est   Nitrite   Protein   CREAT   Urine HCG        07/02/22 0131 Yellow   Clear   Negative   Negative   Negative   >=300 mg/dL (3+)               No results found for: BLOODCX  No results found for: URINECX  No results found for: WOUNDCX  No results found for: STOOLCX  No results found for: RESPCX  No results found for: AFBCX  Results from last 7 days   Lab Units 07/02/22  0512   SED RATE mm/hr 8   CRP mg/dL <0.30       I have personally looked at the labs and they are summarized above.  ----------------------------------------------------------------------------------------------------------------------  Detailed radiology reports for the last 24 hours:  Imaging Results (Last 24 Hours)     Procedure Component Value Units Date/Time    MRI Cervical Spine Without Contrast [[id number]] Resulted: 07/02/22 1730     Updated: 07/02/22 1820    MRI Angiogram Head Without Contrast [[id number]] Collected: 07/02/22  1336     Updated: 07/02/22 1505    Narrative:      EXAMINATION: MRI ANGIOGRAM HEAD WITHOUT CONTRAST-      CLINICAL INDICATION: Neuro deficit, acute, stroke suspected        COMPARISON: MRI dated 07/01/2022.     PROCEDURE: Using 3-D time-of-flight, magnetic resonance angiography of  the cerebral circulation was performed.  Thin cut source images were acquired and reformatted images were  created.     FINDINGS:  No evidence of large vessel occlusion.     No aneurysm.     No evidence of vascular malformation.     No contrast enhancing abnormalities in the parenchyma.       Impression:      No large vessel occlusion, aneurysm, or vascular malformation.      This report was finalized on 7/2/2022 3:03 PM by Dr. Boris Caballero MD.       MRI Angiogram Neck Without Contrast [[id number]] Collected: 07/02/22 1337     Updated: 07/02/22 1505    Narrative:      EXAMINATION: MRI ANGIOGRAM NECK WITHOUT CONTRAST-      CLINICAL INDICATION: Stroke, follow up        COMPARISON: MRI brain 07/01/2022.     PROCEDURE:  Using 3-D time-of-flight, magnetic resonance angiography of the carotid  vessels was performed.  Thin cut source images were acquired. Reformatted images were created.     FINDINGS:  No evidence of large vessel occlusion.     No focal stenosis is seen.     No extrinsic deviation of the carotid vessels.       Impression:      No hemodynamically significant stenosis.      This report was finalized on 7/2/2022 3:03 PM by Dr. Boris Caballero MD.           Assessment & Plan      Patient is a 62-year-old female with history significant for ESRD on peritoneal dialysis, essential hypertension and HFpEF who presented to the ER with complaints of persistent nausea and vomiting and noted to have acute/subacute embolic appearing infarcts.    #Acute versus subacute ischemic stroke  --Mechanism: cardioembolic vs vs small vessel disease (lacunar). No TPA d/t recent subdural hematoma. no thrombectomy d/t no thrombus on imaging.  --Risk factors:  ESRD, essential hypertension, type 2 diabetes mellitus  --Stroke labs: TSH, A1C, troponins  --CT head: Atrophy and chronic microangiopathic change  --MRA head/neck no large vessel occlusion, aneurysm or vascular malformation, no significant stenosis  --MRI head w/o few small patchy foci of acute versus early subacute infarct involving left frontal lobe and left cerebellar hemisphere, extensive chronic microvascular ischemic changes with multiple old lacunar infarcts  --MRI cervical spine without contrast to rule out myelopathy pending  --bedside dysphagia passed  --Echocardiogram with preserved systolic function and negative agitated saline study  --NIHSS scale on admission and daily  --pt-ot eval  --Target /80  --Patient is now out of the 24-hour permissive hypertensive window, DC Cardizem, resume home Coreg, will add low-dose Procardia and adjust antihypertensive regimen as needed, BP has been very labile in the past  --Continue atorvastatin, resume daily aspirin  --Neurology recommended LP to rule out leptomeningeal neoplastic process, I discussed this with patient at bedside who noted previous LP that was unsuccessful at bedside after multiple unsuccessful attempts, will discuss and will need to ask radiology to perform this on Monday/Tuesday if needed  --will need Holter monitor at the time of discharge  --Neurology following, appreciate recommendations    #Hypertensive urgency, resolved  --Systolic greater than 240s in the ER, received IV hydralazine x2.  Although permissive hypertension is in effect, received antihypertensives due to systolic greater than 240.  She is remained borderline and has occasionally been greater than 200.  In order to accurately and effectively control BP without wide swings in hypo/hypertension, she was started on Cardizem gtt.  --Outside permissive hypertension window per above, LORA Cardizem, uptitrate p.o. meds slowly    #NSTEMI, likely type II  --EKG sinus tachycardia,  troponins peaked at 0.085, did report tightness when BP was greater than 240  --Echo per above  --Normal stress test in 2019  --Continue aspirin/statin, beta-blocker  --Can consider ischemic stress test prior to DC  --Continue to monitor on telemetry    #Chronic nausea/vomiting, concern for gastroparesis  --when patient's above acute issues are resolved, will discuss barium swallow for evaluation of gastroparesis  --As needed Zofran, caution due to prolonged QTC    #ESRD on peritoneal dialysis  --CT abdomen/pelvis noted small volume ascites versus dialysate  --Nephrology consulted for inpatient assistance    #Unintentional weight loss  --suspect due to peritoneal dialysis & protein calorie malnutrition, outpatient work-up, will need outpatient age-appropriate work-up including mammogram and colonoscopy if not up-to-date    #Type 2 diabetes mellitus, A1c 6%, SSI, adjust as needed  #Hypothyroidism, TSH 7.6, continue Synthroid  #Recent spontaneous subdural hematoma, resolved on imaging  #Prolonged QTC, QTc 568  #Chronic normocytic anemia  #Peripheral arterial disease, ASA, statin  #Hyperlipidemia, continue atorvastatin  #HFpEF, compensated  #Hypokalemia/hypomagnesemia, replace as needed w/ PD nightly  #GERD, PPI    CHECKLIST:  Abx: None  VTE: SCDs  GI ppx: PPI  Diet: Consistent carb  Code: CPR, full  Dispo: Patient is high risk due to acute versus subacute strokes.  Anticipate greater than 2 midnight stay.  Disposition unclear at this time due to critical illness, suspect inpatient rehab versus SNF.    Ari Rosa DO  [facility]  07/03/22  10:09 EDT

## 2022-07-04 LAB
25(OH)D3 SERPL-MCNC: 27.4 NG/ML (ref 30–100)
ANION GAP SERPL CALCULATED.3IONS-SCNC: 9.3 MMOL/L (ref 5–15)
BASOPHILS # BLD AUTO: 0.04 10*3/MM3 (ref 0–0.2)
BASOPHILS NFR BLD AUTO: 0.6 % (ref 0–1.5)
BUN SERPL-MCNC: 42 MG/DL (ref 8–23)
BUN/CREAT SERPL: 12.7 (ref 7–25)
CALCIUM SPEC-SCNC: 7.9 MG/DL (ref 8.6–10.5)
CHLORIDE SERPL-SCNC: 99 MMOL/L (ref 98–107)
CO2 SERPL-SCNC: 26.7 MMOL/L (ref 22–29)
CORTIS SERPL-MCNC: 27.42 MCG/DL
CREAT SERPL-MCNC: 3.31 MG/DL (ref 0.57–1)
DEPRECATED RDW RBC AUTO: 44.3 FL (ref 37–54)
EGFRCR SERPLBLD CKD-EPI 2021: 15 ML/MIN/1.73
EOSINOPHIL # BLD AUTO: 0.19 10*3/MM3 (ref 0–0.4)
EOSINOPHIL NFR BLD AUTO: 2.7 % (ref 0.3–6.2)
ERYTHROCYTE [DISTWIDTH] IN BLOOD BY AUTOMATED COUNT: 13.6 % (ref 12.3–15.4)
GLUCOSE BLDC GLUCOMTR-MCNC: 193 MG/DL (ref 70–130)
GLUCOSE SERPL-MCNC: 281 MG/DL (ref 65–99)
HCT VFR BLD AUTO: 26.9 % (ref 34–46.6)
HGB BLD-MCNC: 8.8 G/DL (ref 12–15.9)
IMM GRANULOCYTES # BLD AUTO: 0.02 10*3/MM3 (ref 0–0.05)
IMM GRANULOCYTES NFR BLD AUTO: 0.3 % (ref 0–0.5)
LYMPHOCYTES # BLD AUTO: 1.65 10*3/MM3 (ref 0.7–3.1)
LYMPHOCYTES NFR BLD AUTO: 23.2 % (ref 19.6–45.3)
MCH RBC QN AUTO: 28.9 PG (ref 26.6–33)
MCHC RBC AUTO-ENTMCNC: 32.7 G/DL (ref 31.5–35.7)
MCV RBC AUTO: 88.5 FL (ref 79–97)
MONOCYTES # BLD AUTO: 0.38 10*3/MM3 (ref 0.1–0.9)
MONOCYTES NFR BLD AUTO: 5.4 % (ref 5–12)
NEUTROPHILS NFR BLD AUTO: 4.82 10*3/MM3 (ref 1.7–7)
NEUTROPHILS NFR BLD AUTO: 67.8 % (ref 42.7–76)
NRBC BLD AUTO-RTO: 0 /100 WBC (ref 0–0.2)
PLATELET # BLD AUTO: 129 10*3/MM3 (ref 140–450)
PMV BLD AUTO: 10.5 FL (ref 6–12)
POTASSIUM SERPL-SCNC: 2.9 MMOL/L (ref 3.5–5.2)
RBC # BLD AUTO: 3.04 10*6/MM3 (ref 3.77–5.28)
SODIUM SERPL-SCNC: 135 MMOL/L (ref 136–145)
WBC NRBC COR # BLD: 7.1 10*3/MM3 (ref 3.4–10.8)

## 2022-07-04 PROCEDURE — 97162 PT EVAL MOD COMPLEX 30 MIN: CPT

## 2022-07-04 PROCEDURE — 82306 VITAMIN D 25 HYDROXY: CPT | Performed by: STUDENT IN AN ORGANIZED HEALTH CARE EDUCATION/TRAINING PROGRAM

## 2022-07-04 PROCEDURE — 99233 SBSQ HOSP IP/OBS HIGH 50: CPT | Performed by: INTERNAL MEDICINE

## 2022-07-04 PROCEDURE — 25010000002 ONDANSETRON PER 1 MG: Performed by: STUDENT IN AN ORGANIZED HEALTH CARE EDUCATION/TRAINING PROGRAM

## 2022-07-04 PROCEDURE — 99233 SBSQ HOSP IP/OBS HIGH 50: CPT | Performed by: PSYCHIATRY & NEUROLOGY

## 2022-07-04 PROCEDURE — 80048 BASIC METABOLIC PNL TOTAL CA: CPT | Performed by: STUDENT IN AN ORGANIZED HEALTH CARE EDUCATION/TRAINING PROGRAM

## 2022-07-04 PROCEDURE — 92523 SPEECH SOUND LANG COMPREHEN: CPT

## 2022-07-04 PROCEDURE — 82962 GLUCOSE BLOOD TEST: CPT

## 2022-07-04 PROCEDURE — 82533 TOTAL CORTISOL: CPT | Performed by: INTERNAL MEDICINE

## 2022-07-04 PROCEDURE — 85025 COMPLETE CBC W/AUTO DIFF WBC: CPT | Performed by: STUDENT IN AN ORGANIZED HEALTH CARE EDUCATION/TRAINING PROGRAM

## 2022-07-04 RX ORDER — DEXTROSE MONOHYDRATE 25 G/50ML
25 INJECTION, SOLUTION INTRAVENOUS
Status: DISCONTINUED | OUTPATIENT
Start: 2022-07-04 | End: 2022-07-10 | Stop reason: HOSPADM

## 2022-07-04 RX ORDER — NICOTINE POLACRILEX 4 MG
15 LOZENGE BUCCAL
Status: DISCONTINUED | OUTPATIENT
Start: 2022-07-04 | End: 2022-07-10 | Stop reason: HOSPADM

## 2022-07-04 RX ORDER — POTASSIUM CHLORIDE 20 MEQ/1
40 TABLET, EXTENDED RELEASE ORAL ONCE
Status: COMPLETED | OUTPATIENT
Start: 2022-07-04 | End: 2022-07-04

## 2022-07-04 RX ORDER — INSULIN ASPART 100 [IU]/ML
0-7 INJECTION, SOLUTION INTRAVENOUS; SUBCUTANEOUS
Status: DISCONTINUED | OUTPATIENT
Start: 2022-07-04 | End: 2022-07-10 | Stop reason: HOSPADM

## 2022-07-04 RX ADMIN — CLONIDINE HYDROCHLORIDE 0.1 MG: 0.1 TABLET ORAL at 21:37

## 2022-07-04 RX ADMIN — Medication 10 ML: at 08:24

## 2022-07-04 RX ADMIN — ROPINIROLE HYDROCHLORIDE 0.5 MG: 0.25 TABLET, FILM COATED ORAL at 08:22

## 2022-07-04 RX ADMIN — Medication 1 TABLET: at 08:22

## 2022-07-04 RX ADMIN — Medication 10 ML: at 20:03

## 2022-07-04 RX ADMIN — POTASSIUM CHLORIDE 40 MEQ: 20 TABLET, EXTENDED RELEASE ORAL at 08:42

## 2022-07-04 RX ADMIN — ATORVASTATIN CALCIUM 80 MG: 40 TABLET, FILM COATED ORAL at 20:03

## 2022-07-04 RX ADMIN — LEVOTHYROXINE SODIUM 25 MCG: 50 TABLET ORAL at 06:34

## 2022-07-04 RX ADMIN — FLUOXETINE HYDROCHLORIDE 40 MG: 20 CAPSULE ORAL at 08:22

## 2022-07-04 RX ADMIN — Medication 10 ML: at 20:04

## 2022-07-04 RX ADMIN — Medication 100 MG: at 08:22

## 2022-07-04 RX ADMIN — FOLIC ACID 1 MG: 1 TABLET ORAL at 08:22

## 2022-07-04 RX ADMIN — HYDROCODONE BITARTRATE AND ACETAMINOPHEN 1 TABLET: 10; 325 TABLET ORAL at 00:00

## 2022-07-04 RX ADMIN — ONDANSETRON 4 MG: 2 INJECTION INTRAMUSCULAR; INTRAVENOUS at 11:55

## 2022-07-04 RX ADMIN — PANTOPRAZOLE SODIUM 40 MG: 40 TABLET, DELAYED RELEASE ORAL at 06:34

## 2022-07-04 RX ADMIN — ASPIRIN 81 MG: 81 TABLET, COATED ORAL at 08:41

## 2022-07-04 RX ADMIN — ROPINIROLE HYDROCHLORIDE 0.5 MG: 0.25 TABLET, FILM COATED ORAL at 20:03

## 2022-07-04 RX ADMIN — HYDROCODONE BITARTRATE AND ACETAMINOPHEN 1 TABLET: 10; 325 TABLET ORAL at 17:06

## 2022-07-04 RX ADMIN — Medication 10 ML: at 08:23

## 2022-07-04 RX ADMIN — CETIRIZINE HYDROCHLORIDE 5 MG: 10 TABLET, FILM COATED ORAL at 08:22

## 2022-07-04 RX ADMIN — CLONIDINE HYDROCHLORIDE 0.1 MG: 0.1 TABLET ORAL at 13:31

## 2022-07-04 NOTE — PROGRESS NOTES
Stroke Progress Note       Chief Complaint: Persistent nausea and vomiting    Subjective    Subjective     Subjective:  No acute issues overnight.  Patient continues to be generally weak.  Denies any nausea and vomiting this morning.  She had hypotension yesterday for which was started on Levophed, which is on hold now.  Blood pressure better controlled.  Denies any new focal symptoms.    Review of Systems   Constitutional: Positive for fatigue.   HENT: Negative.    Eyes: Negative.    Respiratory: Negative.    Cardiovascular: Negative.    Gastrointestinal: Positive for nausea.   Endocrine: Negative.    Genitourinary: Negative.    Musculoskeletal: Positive for myalgias.   Skin: Negative.    Allergic/Immunologic: Negative.    Psychiatric/Behavioral: Negative.             Objective    Objective      Temp:  [92.7 °F (33.7 °C)-99.1 °F (37.3 °C)] 99.1 °F (37.3 °C)  Heart Rate:  [51-90] 90  Resp:  [8-25] 11  BP: ()/() 144/97        Neurological Exam  Mental Status  Awake, alert and oriented to person, place and time.Alert. Speech is normal. Language is fluent with no aphasia. Attention and concentration are normal.    Cranial Nerves  CN II: Visual fields full to confrontation.  CN III, IV, VI: Extraocular movements intact bilaterally. Normal lids and orbits bilaterally. Pupils equal round and reactive to light bilaterally.  CN V: Facial sensation is normal.  CN VII: Full and symmetric facial movement.  CN IX, X: Palate elevates symmetrically  CN XI: Shoulder shrug strength is normal.  CN XII: Tongue midline without atrophy or fasciculations.    Motor  Decreased muscle bulk throughout. No fasciculations present.  No obvious focal weakness appreciated.    Sensory  Light touch is normal in upper and lower extremities.     Reflexes  Not assessed.    Coordination    No obvious dysmetria.    Gait    Not assessed.      Physical Exam  Vitals and nursing note reviewed.   Constitutional:       Appearance: She is  ill-appearing.   HENT:      Head: Normocephalic and atraumatic.      Mouth/Throat:      Mouth: Mucous membranes are moist.      Pharynx: Oropharynx is clear.   Eyes:      General: Lids are normal.      Extraocular Movements: Extraocular movements intact.      Pupils: Pupils are equal, round, and reactive to light.   Cardiovascular:      Rate and Rhythm: Normal rate and regular rhythm.   Pulmonary:      Effort: Pulmonary effort is normal. No respiratory distress.   Musculoskeletal:      Cervical back: Normal range of motion and neck supple.   Neurological:      Mental Status: She is alert.   Psychiatric:         Mood and Affect: Mood normal.         Speech: Speech normal.         Behavior: Behavior normal.         Results Review:    I reviewed the patient's new clinical results.    Results for orders placed during the hospital encounter of 07/01/22    Adult Transthoracic Echo Complete W/ Cont if Necessary Per Protocol (With Agitated Saline)    Interpretation Summary  · Left ventricular ejection fraction appears to be 51 - 55%. Left ventricular systolic function is normal.  · Left ventricular diastolic function is consistent with (grade I) impaired relaxation.  · The aortic valve is abnormal in structure. The aortic valve exhibits sclerosis. There is mild calcification of the aortic valve. The aortic valve was poorly visualized but appears trileaflet. Trace aortic valve regurgitation is present. Mild aortic valve stenosis is present.  · Saline test results are negative.            Assessment/Plan     Assessment/Plan:  62-year-old right-handed white female with known diagnosis of hypertension, diabetes, chronic diastolic dysfunction, end-stage renal disease on peritoneal dialysis, recent traumatic subdural hemorrhage, comes in with persistent nausea and vomiting and imaging found to have a small left cerebellar and left frontal region stroke.      1. Multifocal multiple vascular territorial acute to subacute ischemic  strokes.  Unclear etiology, although given her unexplained weight loss, persistent nausea and vomiting, there was concern for possible hypercoagulability from occult malignancy.  It was recommended to get lumbar puncture to look for leptomeningeal process, but patient refusing LP at this time.  Clinically she denies any focal symptoms.  Patient is on aspirin 81 mg and Lipitor 80 mg daily for secondary stroke prevention.  She has had CT chest/abdomen/pelvis which shows no evidence of malignancy.  Recommend prolonged cardiac monitoring on discharge, to look for cardiac arrhythmias.  In the meantime continue monitoring for any overt signs of malignancy.  2. Essential hypertension.  Patient had episode of hypotension requiring pressor support yesterday.  Off pressor now.  Normal blood pressure goals for her.  No need for permissive hypertension.  3. Diabetes mellitus type 2 uncontrolled with hyperglycemia.  Her A1c 7.6, goal of less than 7.  Maintain normoglycemia.  4. Persistent nausea and vomiting.  Unclear etiology.  Continue management as per the primary team.  5. Unexplained weight loss.  Could be secondary to persistent nausea and vomiting.  May need GI work-up as outpatient.  Continue to monitor clinically.  6. Increase activity as tolerated.  7. Healthy heart/diabetic diet.    Case was discussed with patient, nursing and will let the primary team know.  We will follow patient peripherally, please call us with questions.  Thank you for the consult.          Ha Delcid MD  07/04/22  09:46 EDT    This was an audio and video enabled telemedicine encounter.

## 2022-07-04 NOTE — THERAPY EVALUATION
Acute Care - Physical Therapy Initial Evaluation  EDWARD Veliz     Patient Name: Reny Elena  : 1960  MRN: [id number]  Today's Date: 2022      Visit Dx:     ICD-10-CM ICD-9-CM   1. Cerebrovascular accident (CVA), unspecified mechanism ([facility])  I63.9 434.91   2. Hypotension, unspecified hypotension type  I95.9 458.9     Patient Active Problem List   Diagnosis   • Tibia/fibula fracture   • Iron deficiency anemia   • Type 2 diabetes mellitus with hyperglycemia, with long-term current use of insulin ([facility])   • Wound of right ankle   • Cellulitis   • Metabolic encephalopathy   • History of non-ST elevation myocardial infarction (NSTEMI)   • CKD (chronic kidney disease) stage 3, GFR 30-59 ml/min ([facility])   • Elevated troponin   • HTN (hypertension)   • CVA (cerebral vascular accident) ([facility])   • Chronic diastolic CHF (congestive heart failure) ([facility])   • Decubitus ulcer of coccyx, unspecified pressure ulcer stage   • Depression   • Expressive aphasia   • Impaired mobility and ADLs   • Renal failure   • Hyperkalemia   • Subdural hematoma ([facility])   • Severe malnutrition ([facility])   • Cerebrovascular accident (CVA), unspecified mechanism ([facility])     Past Medical History:   Diagnosis Date   • Arthritis    • Closed fracture of right fibula and tibia 2018   • Depression    • Diabetic ulcer of left foot associated with type 2 diabetes mellitus ([facility]) 2017   • Diastolic dysfunction    • Disease of thyroid gland    • Elevated cholesterol    • End stage renal disease ([facility])    • Essential hypertension    • GERD (gastroesophageal reflux disease)    • History of transfusion    • Hyperlipidemia    • Iron deficiency anemia 2018   • PAD (peripheral artery disease) ([facility])    • Renal failure    • Type 2 diabetes mellitus with hyperglycemia, with long-term current use of insulin ([facility]) 2018     Past Surgical History:   Procedure Laterality Date   • ABDOMINAL SURGERY     • BACK SURGERY      Post spinal diskectomy,  osteophytectomy in one lumbar interspace   • CATARACT EXTRACTION      Left    •  SECTION     • DENTAL PROCEDURE     • ENDOSCOPY     • FRACTURE SURGERY      right leg   • HYSTERECTOMY     • INCISION AND DRAINAGE LEG Left 4/15/2017    Procedure: INCISION AND DRAINAGE LOWER EXTREMITY;  Surgeon: Basilio Morris MD;  Location: [facility] OR;  Service:    • INCISION AND DRAINAGE LEG Left 2017    Procedure: Irrigation and Debridment abcess diabetic wound left foot with deep culture;  Surgeon: Basilio Morris MD;  Location: [facility] OR;  Service:    • INSERTION PERITONEAL DIALYSIS CATHETER N/A 2021    Procedure: INSERTION PERITONEAL DIALYSIS CATHETER LAPAROSCOPIC;  Surgeon: Edy Glover MD;  Location: [facility] OR;  Service: General;  Laterality: N/A;   • KNEE ARTHROSCOPY Right    • ORIF TIBIA FRACTURE Right 2018    Procedure: TIBIAL  FRACTURE OPEN REDUCTION INTERNAL FIXATION;  Surgeon: Jung Barragan MD;  Location: [facility] OR;  Service: Orthopedics   • TOE AMPUTATION Right     5th   • TUBAL ABDOMINAL LIGATION       PT Assessment (last 12 hours)     PT Evaluation and Treatment     Row Name 22 1400          Physical Therapy Time and Intention    Subjective Information complains of;weakness;pain  -KM     Document Type evaluation  -KM     Mode of Treatment individual therapy;physical therapy  -KM     Patient Effort good  -KM     Row Name 22 1400          General Information    Patient Profile Reviewed yes  -KM     Patient Observations alert;cooperative;agree to therapy  -KM     Prior Level of Function independent:;all household mobility;ADL's  -KM     Equipment Currently Used at Home rollator;cane, straight  -KM     Existing Precautions/Restrictions fall  -KM     Equipment Issued to Patient gait belt  -KM     Risks Reviewed patient:;LOB;nausea/vomiting;dizziness;increased discomfort;change in vital signs  -KM     Benefits Reviewed patient:;improve function;increase independence;increase  strength;increase balance;decrease pain  -KM     Row Name 07/04/22 1400          Living Environment    Current Living Arrangements home  -KM     People in Home significant other  -KM     Primary Care Provided by self  -KM     Row Name 07/04/22 1400          Cognition    Affect/Mental Status (Cognition) [facility]  -     Orientation Status (Cognition) oriented x 4  -KM     Follows Commands (Cognition) WFL  -KM     Row Name 07/04/22 1400          Range of Motion (ROM)    Range of Motion bilateral lower extremities;ROM is WFL  -KM     Row Name 07/04/22 1400          Strength (Manual Muscle Testing)    Strength (Manual Muscle Testing) bilateral lower extremities;strength is WFL  -KM     Row Name 07/04/22 1400          Bed Mobility    Bed Mobility bed mobility (all) activities  -     All Activities, [place] (Bed Mobility) minimum assist (75% patient effort)  -     Assistive Device (Bed Mobility) head of bed elevated  -KM     Row Name 07/04/22 1400          Transfers    Transfers sit-stand transfer;stand-sit transfer  -     Sit-Stand [place] (Transfers) minimum assist (75% patient effort)  -KM     Stand-Sit [place] (Transfers) minimum assist (75% patient effort)  -KM     Row Name 07/04/22 1400          Sit-Stand Transfer    Assistive Device (Sit-Stand Transfers) walker, front-wheeled  -KM     Row Name 07/04/22 1400          Stand-Sit Transfer    Assistive Device (Stand-Sit Transfers) walker, front-wheeled  -KM     Row Name 07/04/22 1400          Gait/Stairs (Locomotion)    Comment, (Gait/Stairs) Unable to perform ambulation d/t L knee chronic dislocation and pain  -KM     Row Name 07/04/22 1400          Safety Issues, Functional Mobility    Impairments Affecting Function (Mobility) balance;endurance/activity tolerance;pain  -KM     Row Name 07/04/22 1400          Balance    Balance Assessment sitting static balance;standing static balance  -     Static Sitting Balance standby assist  -     Static  Standing Balance minimal assist  -KM     Position/Device Used, Standing Balance walker, front-wheeled  -KM     Row Name 07/04/22 1400          Plan of Care Review    Plan of Care Reviewed With patient  -KM     Outcome Evaluation Pt. performs bed mobility and transfers w/ Yusef. Pt. uses RW for transfers. Pt. has chronic L knee dislocations which hinders mobility. Pt. unable to ambulate d/t knee pain and instability d/t dislocation of L knee. Pt. would benefit from skilled PT services. Anticipated discharge to inpatient rehab facility.  -     Row Name 07/04/22 1400          Therapy Assessment/Plan (PT)    Patient/Family Therapy Goals Statement (PT) Return to PLOF  -KM     Functional Level at Time of Evaluation (PT) Yusef  -KM     PT Diagnosis (PT) lower extremity weakness  -KM     Rehab Potential (PT) good, to achieve stated therapy goals  -     Criteria for Skilled Interventions Met (PT) yes;skilled treatment is necessary  -     Therapy Frequency (PT) 5 times/wk  -KM     Predicted Duration of Therapy Intervention (PT) until discharge  -     Problem List (PT) problems related to;balance;mobility;pain;strength  -KM     Activity Limitations Related to Problem List (PT) unable to ambulate safely;unable to transfer safely  -     Row Name 07/04/22 1400          Therapy Plan Review/Discharge Plan (PT)    Therapy Plan Review (PT) evaluation/treatment results reviewed;care plan/treatment goals reviewed;risks/benefits reviewed;patient  -KM     Row Name 07/04/22 1400          Physical Therapy Goals    Bed Mobility Goal Selection (PT) bed mobility, PT goal 1  -KM     Transfer Goal Selection (PT) transfer, PT goal 1  -KM     Gait Training Goal Selection (PT) gait training, PT goal 1  -KM     Row Name 07/04/22 1400          Bed Mobility Goal 1 (PT)    Activity/Assistive Device (Bed Mobility Goal 1, PT) bed mobility activities, all  -KM     [place] Level/Cues Needed (Bed Mobility Goal 1, PT) standby assist  -KM      Time Frame (Bed Mobility Goal 1, PT) by discharge  -     Row Name 07/04/22 1400          Transfer Goal 1 (PT)    Activity/Assistive Device (Transfer Goal 1, PT) sit-to-stand/stand-to-sit  -KM     [place] Level/Cues Needed (Transfer Goal 1, PT) standby assist  -KM     Time Frame (Transfer Goal 1, PT) by discharge  -     Row Name 07/04/22 1400          Gait Training Goal 1 (PT)    Activity/Assistive Device (Gait Training Goal 1, PT) gait (walking locomotion);assistive device use;walker, rolling  -KM     [place] Level (Gait Training Goal 1, PT) minimum assist (75% or more patient effort)  -KM     Distance (Gait Training Goal 1, PT) 30'  -KM     Time Frame (Gait Training Goal 1, PT) by discharge  -           User Key  (r) = Recorded By, (t) = Taken By, (c) = Cosigned By    Initials Name Provider Type    Dewayne Heard, MARIA C Physical Therapist                Physical Therapy Education                 Title: PT OT SLP Therapies (Done)     Topic: Physical Therapy (Done)     Point: Mobility training (Done)     Learning Progress Summary           Patient Acceptance, E,TB, VU by  at 7/4/2022 1422                   Point: Home exercise program (Done)     Learning Progress Summary           Patient Acceptance, E,TB, VU by  at 7/4/2022 1422                   Point: Body mechanics (Done)     Learning Progress Summary           Patient Acceptance, E,TB, VU by  at 7/4/2022 1422                   Point: Precautions (Done)     Learning Progress Summary           Patient Acceptance, E,TB, VU by  at 7/4/2022 1422                               User Key     Initials Effective Dates Name Provider Type [facility] 05/24/22 -  Dewayne Escamilla, MARIA C Physical Therapist PT              PT Recommendation and Plan  Anticipated Discharge Disposition (PT): inpatient rehabilitation facility  Planned Therapy Interventions (PT): balance training, bed mobility training, gait training, home exercise program, patient/family  education, strengthening, stretching, transfer training  Therapy Frequency (PT): 5 times/wk  Plan of Care Reviewed With: patient  Outcome Evaluation: Pt. performs bed mobility and transfers w/ Yusef. Pt. uses RW for transfers. Pt. has chronic L knee dislocations which hinders mobility. Pt. unable to ambulate d/t knee pain and instability d/t dislocation of L knee. Pt. would benefit from skilled PT services. Anticipated discharge to inpatient rehab facility.       Time Calculation:    PT Charges     Row Name 07/04/22 1413             Time Calculation    PT Received On 07/04/22  -KM      PT Goal Re-Cert Due Date 07/18/22  -KM            User Key  (r) = Recorded By, (t) = Taken By, (c) = Cosigned By    Initials Name Provider Type    Dewayne Heard, PT Physical Therapist              Therapy Charges for Today     Code Description Service Date Service Provider Modifiers Qty    [id number]  PT EVAL MOD COMPLEXITY 4 7/4/2022 Dewayne Escamilla, PT GP 1               Dewayne Escamilla PT  7/4/2022

## 2022-07-04 NOTE — PLAN OF CARE
Goal Outcome Evaluation:   Pt alert and oriented x 4. VSS and afebrile this shift. Pt has had some urinary retention, bladder scan showed 751 ml's in bladder. Pt was given a straight cath, pt had 600 ml's of urine out. No complaints from pt at this time. Safety maintained. Will continue to monitor during this shift.

## 2022-07-04 NOTE — PROGRESS NOTES
[facility] HOSPITALIST PROGRESS NOTE    Subjective     History:   Reny Elena is a 62 y.o. female admitted on 7/1/2022 secondary to <principal problem not specified>     Procedures:   7/2/22: Right IJ central line placement    CC: Follow up CVA    Patient seen and examined with GLORIA Grey. Awake and alert. Reports generalized weakness. Reports HA. Reports ongoing vision changes over the last month. No further episodes of vomiting since yesterday. BP has fluctuated and elevated this AM after becoming hypotensive yesterday briefly requiring vasopressor support.     History taken from: patient, chart, and RN.      Objective     Vital Signs  Temp:  [92.7 °F (33.7 °C)-99.1 °F (37.3 °C)] 99.1 °F (37.3 °C)  Heart Rate:  [53-93] 93  Resp:  [8-25] 22  BP: ()/() 234/115    Intake/Output Summary (Last 24 hours) at 7/4/2022 1234  Last data filed at 7/4/2022 0636  Gross per 24 hour   Intake 2578 ml   Output 1078 ml   Net 1500 ml         Physical Exam:  General:    Awake, alert, in no acute distress, chronically ill appearing    Heart:      Normal S1 and S2. Regular rate and rhythm. No significant murmur, rubs or gallops appreciated.   Lungs:     Respirations regular, even and unlabored. Lungs clear to auscultation B/L. No wheezes, rales or rhonchi.   Abdomen:   Soft and nontender. No guarding, rebound tenderness or  organomegaly noted. Bowel sounds present x 4.   Extremities:  No clubbing, cyanosis or edema noted. Moves UE and LE equally B/L with generalized weakness.      Results Review:    Results from last 7 days   Lab Units 07/04/22  0035 07/03/22  1445 07/03/22  0022 07/02/22  0512 07/01/22  1727   WBC 10*3/mm3 7.10 7.83 8.28 6.62 4.43   HEMOGLOBIN g/dL 8.8* 8.5* 9.8* 11.2* 11.8*   PLATELETS 10*3/mm3 129* 137* 138* 152 131*     Results from last 7 days   Lab Units 07/04/22  0035 07/03/22  1445 07/03/22  0022 07/02/22  0512 07/01/22  1727   SODIUM mmol/L 135* 134* 141 142 138   POTASSIUM mmol/L  2.9* 3.5 3.3* 3.4* 3.8   CHLORIDE mmol/L 99 98 101 101 100   CO2 mmol/L 26.7 27.2 26.7 25.1 25.4   BUN mg/dL 42* 43* 48* 42* 37*   CREATININE mg/dL 3.31* 3.40* 3.82* 3.78* 3.38*   CALCIUM mg/dL 7.9* 7.8* 8.1* 8.7 8.6   GLUCOSE mg/dL 281* 233* 264* 219* 206*     Results from last 7 days   Lab Units 07/03/22  1445 07/01/22  1727   BILIRUBIN mg/dL 0.3 0.5   ALK PHOS U/L 63 82   AST (SGOT) U/L 16 22   ALT (SGPT) U/L 11 16     Results from last 7 days   Lab Units 07/02/22  0512 07/01/22  1931   MAGNESIUM mg/dL 1.6 1.5*     Results from last 7 days   Lab Units 07/01/22  1727   INR  0.97     Results from last 7 days   Lab Units 07/02/22  0512 07/01/22  1931 07/01/22  1727   TROPONIN T ng/mL 0.071* 0.081* 0.085*       Imaging Results (Last 24 Hours)     Procedure Component Value Units Date/Time    XR Chest 1 View [[id number]] Collected: 07/03/22 1238     Updated: 07/03/22 1240    Narrative:      XR CHEST 1 VW-     CLINICAL INDICATION: confirm central line placement; I63.9-Cerebral  infarction, unspecified        COMPARISON: 07/01/2022      TECHNIQUE: Single frontal view of the chest.     FINDINGS:      LUNGS: Right internal jugular central line tip is at the cavoatrial  junction. No pneumothorax      HEART AND MEDIASTINUM: Heart and mediastinal contours are unremarkable        SKELETON: Bony and soft tissue structures are unremarkable.             Impression:      Central line tip as above     This report was finalized on 7/3/2022 12:38 PM by Dr. Boris Caballero MD.               Medications:  aspirin, 81 mg, Oral, Daily  atorvastatin, 80 mg, Oral, Nightly  cetirizine, 5 mg, Oral, Daily  docusate sodium, 100 mg, Oral, BID  FLUoxetine, 40 mg, Oral, Daily  folic acid, 1 mg, Oral, Daily  gabapentin, 300 mg, Oral, Daily  levothyroxine, 25 mcg, Oral, Q AM  multivitamin, 1 tablet, Oral, Daily  pantoprazole, 40 mg, Oral, Q AM  rOPINIRole, 0.5 mg, Oral, BID  sodium chloride, 10 mL, Intravenous, Q12H  sodium chloride, 10 mL,  Intravenous, Q12H  sodium chloride, 10 mL, Intravenous, Q12H  sodium chloride, 10 mL, Intravenous, Q12H  sodium chloride, 10 mL, Intravenous, Q12H  vitamin B-1, 100 mg, Oral, Daily      norepinephrine, 0.02-0.3 mcg/kg/min, Last Rate: Stopped (07/03/22 1801)            Assessment & Plan    Acute to subacute ischemic strokes: Concern for possible cardioembolic etiology vs small vessel. CT head reveals atrophy and chronic microangiopathic change. MRI reveals a few small patchy foci of acute vs early subacute infarct involving the left frontal and left cerebellar hemisphere with extensive presumed chronic microvascular ischemic changes with multiple old lacunar infarcts. No significant stenosis noted on MRA. Echo reveals an EF of 51-55%, grade I diastolic dysfunction, trace AR and mild AS with negative saline test. Cont ASA and statin. PT/OT and SLP. Will need outpatient event monitor. Teleneurology input appreciated.     Hypertensive urgency: Improved and weaned off Cardizem gtt. Received home Coreg and started on Procardia with subsequent hypotension requiring vasopressor support. Concern for severe orthostatic hypotension with supine HTN. Cont PRN Clonidine per nephrology recs. Cont to monitor BP closely.     Nausea and vomiting with recent weight loss: Concern for possible gastroparesis. No acute findings on CT. May benefit from barium swallow. Neurology recommended LP with concern for possible hypercoagulability/underlying malignancy in the setting of CVA's. However, pt is respectfully declining LP and expresses understanding of outlined concerns. No obvious malignancy on imaging at present. Cont supportive treatment.     Recent traumatic subdural hematoma: No evidence of subdural hematoma on imaging. Cont supportive treatment with close monitoring.     Hypokalemia: Replace x 1 in the setting of ESRD and repeat labs including Mg in the AM. PD per nephrology. Cont to monitor.      ESRD on PD: Cont PD per nephrology  with input appreciated.     DM II, non-insulin dependent with neuropathy: HgbA1c 7.6. Episode of hypoglycemia on 7/3 with no further episodes reported. Add Accuchecks.     Chronic diastolic CHF: Echo as above. Appears compensated at present. PD per nephrology. Monitor volume status.     Chronic normocytic anemia: Possibly anemia of CKD. No obvious signs of bleeding. Cont to monitor.     Hyperlipidemia: Cont statin.     Hypothyroidism: TSH is elevated with normal free T4. Cont levothyroxine.     PAD: Cont ASA and statin.     DVT PPX: SCD's    Pt is at high risk 2/2 acute CVA, hypertensive urgency, DM with hypoglycemia, electrolyte abnormalities, recent subdural hematoma, CHF, ESRD on PD, unintentional weight loss.     Disposition: May require short term rehab placement.       Mark Crawford DO  07/04/22  12:34 EDT

## 2022-07-04 NOTE — PROGRESS NOTES
Nephrology Progress Note    Interval History:     Patient Complaints: Feels better today.  Uneventful dialysis with slightly under 500 mL ultrafiltration.  No shortness of breath.  Ate a little.  Is undergoing swallowing evaluation.        Vital Signs  Temp:  [92.7 °F (33.7 °C)-99.1 °F (37.3 °C)] 99.1 °F (37.3 °C)  Heart Rate:  [53-93] 93  Resp:  [8-25] 22  BP: ()/() 234/115    Physical Exam:                  General Appearance:    Alert, cooperative       Eyes:           no pallor, pupils CCERLA         Throat:   oral mucosa moist             Lungs:     Clear to auscultation    Heart:    Regular rhythm and normal rate, normal S1 and S2   Chest Wall:    No abnormalities observed   Abdomen:     Normal bowel sounds, no tenderness   Rectal:     Deferred   Extremities:  No edema            Results Review:    I reviewed the patient's new clinical results.    Lab Results (last 24 hours)     Procedure Component Value Units Date/Time    Cortisol [[id number]] Collected: 07/04/22 0832    Specimen: Blood Updated: 07/04/22 0917     Cortisol 27.42 mcg/dL     Narrative:      Cortisol Reference Ranges:    Cortisol 6AM - 10AM Range: 6.02-18.40 mcg/dl  Cortisol 4PM - 8PM Range: 2.68-10.50 mcg/dl      Results may be falsely increased if patient taking Biotin.      Blood Culture - Blood, Wrist, Right [[id number]]  (Normal) Collected: 07/02/22 0512    Specimen: Blood from Wrist, Right Updated: 07/04/22 0531     Blood Culture No growth at 2 days    Blood Culture - Blood, Arm, Right [[id number]]  (Normal) Collected: 07/02/22 0512    Specimen: Blood from Arm, Right Updated: 07/04/22 0531     Blood Culture No growth at 2 days    Basic Metabolic Panel [[id number]]  (Abnormal) Collected: 07/04/22 0035    Specimen: Blood Updated: 07/04/22 0140     Glucose 281 mg/dL      BUN 42 mg/dL      Creatinine 3.31 mg/dL      Sodium 135 mmol/L      Potassium 2.9 mmol/L      Chloride 99  mmol/L      CO2 26.7 mmol/L      Calcium 7.9 mg/dL      BUN/Creatinine Ratio 12.7     Anion Gap 9.3 mmol/L      eGFR 15.0 mL/min/1.73      Comment: National Kidney Foundation and American Society of Nephrology (ASN) Task Force recommended calculation based on the Chronic Kidney Disease Epidemiology Collaboration (CKD-EPI) equation refit without adjustment for race.       Narrative:      GFR Normal >60  Chronic Kidney Disease <60  Kidney Failure <15      CBC & Differential [[id number]]  (Abnormal) Collected: 07/04/22 0035    Specimen: Blood Updated: 07/04/22 0118    Narrative:      The following orders were created for panel order CBC & Differential.  Procedure                               Abnormality         Status                     ---------                               -----------         ------                     CBC Auto Differential[[id number]]        Abnormal            Final result                 Please view results for these tests on the individual orders.    CBC Auto Differential [[id number]]  (Abnormal) Collected: 07/04/22 0035    Specimen: Blood Updated: 07/04/22 0118     WBC 7.10 10*3/mm3      RBC 3.04 10*6/mm3      Hemoglobin 8.8 g/dL      Hematocrit 26.9 %      MCV 88.5 fL      MCH 28.9 pg      MCHC 32.7 g/dL      RDW 13.6 %      RDW-SD 44.3 fl      MPV 10.5 fL      Platelets 129 10*3/mm3      Neutrophil % 67.8 %      Lymphocyte % 23.2 %      Monocyte % 5.4 %      Eosinophil % 2.7 %      Basophil % 0.6 %      Immature Grans % 0.3 %      Neutrophils, Absolute 4.82 10*3/mm3      Lymphocytes, Absolute 1.65 10*3/mm3      Monocytes, Absolute 0.38 10*3/mm3      Eosinophils, Absolute 0.19 10*3/mm3      Basophils, Absolute 0.04 10*3/mm3      Immature Grans, Absolute 0.02 10*3/mm3      nRBC 0.0 /100 WBC     Vitamin D 25 Hydroxy [[id number]] Collected: 07/04/22 0035    Specimen: Blood Updated: 07/04/22 0115    STAT Lactic Acid, Reflex [[id number]]  (Normal) Collected: 07/03/22 4767    Specimen: Blood Updated:  07/03/22 1753     Lactate 1.6 mmol/L     Lactic Acid, Plasma [[id number]]  (Abnormal) Collected: 07/03/22 1445    Specimen: Blood Updated: 07/03/22 1553     Lactate 2.6 mmol/L     Comprehensive Metabolic Panel [[id number]]  (Abnormal) Collected: 07/03/22 1445    Specimen: Blood Updated: 07/03/22 1551     Glucose 233 mg/dL      BUN 43 mg/dL      Creatinine 3.40 mg/dL      Sodium 134 mmol/L      Potassium 3.5 mmol/L      Chloride 98 mmol/L      CO2 27.2 mmol/L      Calcium 7.8 mg/dL      Total Protein 4.3 g/dL      Albumin 2.45 g/dL      ALT (SGPT) 11 U/L      AST (SGOT) 16 U/L      Alkaline Phosphatase 63 U/L      Total Bilirubin 0.3 mg/dL      Globulin 1.9 gm/dL      A/G Ratio 1.3 g/dL      BUN/Creatinine Ratio 12.6     Anion Gap 8.8 mmol/L      eGFR 14.5 mL/min/1.73      Comment: <15 Indicative of kidney failure       Narrative:      GFR Normal >60  Chronic Kidney Disease <60  Kidney Failure <15      CBC & Differential [[id number]]  (Abnormal) Collected: 07/03/22 1445    Specimen: Blood Updated: 07/03/22 1517    Narrative:      The following orders were created for panel order CBC & Differential.  Procedure                               Abnormality         Status                     ---------                               -----------         ------                     CBC Auto Differential[[id number]]        Abnormal            Final result                 Please view results for these tests on the individual orders.    CBC Auto Differential [[id number]]  (Abnormal) Collected: 07/03/22 1445    Specimen: Blood Updated: 07/03/22 1517     WBC 7.83 10*3/mm3      RBC 2.95 10*6/mm3      Hemoglobin 8.5 g/dL      Hematocrit 26.7 %      MCV 90.5 fL      MCH 28.8 pg      MCHC 31.8 g/dL      RDW 13.7 %      RDW-SD 45.1 fl      MPV 10.5 fL      Platelets 137 10*3/mm3      Neutrophil % 71.9 %      Lymphocyte % 20.3 %      Monocyte % 5.2 %      Eosinophil % 1.9 %      Basophil % 0.4 %      Immature Grans % 0.3 %      Neutrophils,  Absolute 5.63 10*3/mm3      Lymphocytes, Absolute 1.59 10*3/mm3      Monocytes, Absolute 0.41 10*3/mm3      Eosinophils, Absolute 0.15 10*3/mm3      Basophils, Absolute 0.03 10*3/mm3      Immature Grans, Absolute 0.02 10*3/mm3      nRBC 0.0 /100 WBC     POC Glucose Once [[id number]]  (Abnormal) Collected: 07/03/22 1343    Specimen: Blood Updated: 07/03/22 1350     Glucose 190 mg/dL      Comment: Meter: [id number] : [id number] OUSMANE Taomee       POC Glucose Once [[id number]]  (Abnormal) Collected: 07/03/22 1220    Specimen: Blood Updated: 07/03/22 1252     Glucose 161 mg/dL      Comment: Meter: [id number] : [id number] MYRANDA WEBSTERA             Imaging Results (Last 24 Hours)     Procedure Component Value Units Date/Time    XR Chest 1 View [[id number]] Collected: 07/03/22 1238     Updated: 07/03/22 1240    Narrative:      XR CHEST 1 VW-     CLINICAL INDICATION: confirm central line placement; I63.9-Cerebral  infarction, unspecified        COMPARISON: 07/01/2022      TECHNIQUE: Single frontal view of the chest.     FINDINGS:      LUNGS: Right internal jugular central line tip is at the cavoatrial  junction. No pneumothorax      HEART AND MEDIASTINUM: Heart and mediastinal contours are unremarkable        SKELETON: Bony and soft tissue structures are unremarkable.             Impression:      Central line tip as above     This report was finalized on 7/3/2022 12:38 PM by Dr. Boris Caballero MD.             Assessment and Plan:      1.  End-stage renal disease on peritoneal dialysis  2.  Acute CVA left frontal lobe  3.  Severe orthostatic hypotension  4.  Supine hypertension  5.  Type 2 diabetes with neuropathy  6.  Anemia of CKD  7.  Vomiting possible gastroparesis    See note regarding orthostatic hypotension and supine hypertension from yesterday.  Will give orders for dialysis to the dialysis nurse but she is tolerating the same well.  In view of acute stroke have not resumed erythropoietin yet but should probably  be able to do so in a day or 2 if she remains stable    Han Sandy MD  07/04/22  12:34 EDT

## 2022-07-04 NOTE — THERAPY RE-EVALUATION
"Acute Care - Speech Language Pathology Re-Evaluation  [facility]   SPEECH LANGUAGE COGNITIVE RE-EVALUATION     Patient Name: Reny Elena  : 1960  MRN: [id number]  Today's Date: 2022             Admit Date: 2022     Reny Elena  is seen this am on  P219/1P to participate in a formal s/l and cognitive evaluation to assess safety/function in adls. Pt is positioned slightly reclined in bed to cooperatively participate. All results and observations of this evaluation are felt to be representative of pt's current functional status.    She is s/p small left cerebellar and L frontal region stroke.     Ms Elena participated in S/L/Cognitive Evaluation on 22 w/ noted mildly impacted cognitive skills pertaining to idiomatic language, sequencing, and abstract reasoning specifically. She additionally noted visual deficits pertaining to L side.     Social History     Socioeconomic History   • Marital status:    Tobacco Use   • Smoking status: Never Smoker   • Smokeless tobacco: Never Used   Vaping Use   • Vaping Use: Never used   Substance and Sexual Activity   • Alcohol use: No   • Drug use: No   • Sexual activity: Defer        Diet Orders (active) (From admission, onward)     Start     Ordered    22 1200  Dietary Nutrition Supplements Novasource Renal (Nepro)  Daily With Lunch & Dinner       22 1018    22 0228  Diet Regular; Cardiac, Consistent Carbohydrate  Diet Effective Now         22 0227                She is observed on room air w/o complications.     Receptive language skills are intact. Pt is able to id common objects and personal body parts, follow simple and multi-step directives, understand complex \"wh\" questions and participate in conversational exchange w/o difficulties.    Expressive language skills are intact. Pt is able to complete automatic speech tasks, confrontation naming tasks, complete complex oe sentences, respond to complet oe \"wh\" questions, " repeat at word/sentence and multiple digit levels, as well as participate in complex conversational exchange. No aphasia, anomia or verbal apraxia evidenced.    Pt is independently oriented to person, place and time. Immediate, STM and LTM are wfl w/ pt recalling recent adls and providing personal information w/o delays. Thought organization, processing and problem solving are wfl w/ pt demonstrating appropriate comparing/contrasting, understanding of idiomatic language, convergent and divergent thinking skills.    Facial/oral structures are symmetrical upon observation. Oral mucosa are moist, pink and clean. Secretions are clear, thin and well controlled. OROM/INNA is wfl w/o lingual deviation upon protrusion. Speech intensity, clarity and intelligibility are wfl w/o dysarthria or oral apraxia noted.    Graphic and reading skills are intact, premorbid baseline status. Pt is able to id isolated letters, simple and moderate words, as well as sentences and small paragraphs. Signature is legible.    Pragmatic skills are wfl w/ appropriate eye gaze patterns and visual tracking. Language is appropriate in context w/ topic initiation and maintenance independently. No left neglect evidenced. Humor response is intact w/ appropriate affect.      Visit Dx:    ICD-10-CM ICD-9-CM   1. Cerebrovascular accident (CVA), unspecified mechanism ([facility])  I63.9 434.91   2. Hypotension, unspecified hypotension type  I95.9 458.9     Patient Active Problem List   Diagnosis   • Tibia/fibula fracture   • Iron deficiency anemia   • Type 2 diabetes mellitus with hyperglycemia, with long-term current use of insulin ([facility])   • Wound of right ankle   • Cellulitis   • Metabolic encephalopathy   • History of non-ST elevation myocardial infarction (NSTEMI)   • CKD (chronic kidney disease) stage 3, GFR 30-59 ml/min ([facility])   • Elevated troponin   • HTN (hypertension)   • CVA (cerebral vascular accident) ([facility])   • Chronic diastolic CHF (congestive heart  failure) ([facility])   • Decubitus ulcer of coccyx, unspecified pressure ulcer stage   • Depression   • Expressive aphasia   • Impaired mobility and ADLs   • Renal failure   • Hyperkalemia   • Subdural hematoma ([facility])   • Severe malnutrition ([facility])   • Cerebrovascular accident (CVA), unspecified mechanism ([facility])     Past Medical History:   Diagnosis Date   • Arthritis    • Closed fracture of right fibula and tibia 2018   • Depression    • Diabetic ulcer of left foot associated with type 2 diabetes mellitus ([facility]) 2017   • Diastolic dysfunction    • Disease of thyroid gland    • Elevated cholesterol    • End stage renal disease ([facility])    • Essential hypertension    • GERD (gastroesophageal reflux disease)    • History of transfusion    • Hyperlipidemia    • Iron deficiency anemia 2018   • PAD (peripheral artery disease) ([facility])    • Renal failure    • Type 2 diabetes mellitus with hyperglycemia, with long-term current use of insulin ([facility]) 2018     Past Surgical History:   Procedure Laterality Date   • ABDOMINAL SURGERY     • BACK SURGERY      Post spinal diskectomy, osteophytectomy in one lumbar interspace   • CATARACT EXTRACTION      Left    •  SECTION     • DENTAL PROCEDURE     • ENDOSCOPY     • FRACTURE SURGERY      right leg   • HYSTERECTOMY     • INCISION AND DRAINAGE LEG Left 4/15/2017    Procedure: INCISION AND DRAINAGE LOWER EXTREMITY;  Surgeon: Basilio Morris MD;  Location: [facility] OR;  Service:    • INCISION AND DRAINAGE LEG Left 2017    Procedure: Irrigation and Debridment abcess diabetic wound left foot with deep culture;  Surgeon: Basilio Morris MD;  Location: [facility] OR;  Service:    • INSERTION PERITONEAL DIALYSIS CATHETER N/A 2021    Procedure: INSERTION PERITONEAL DIALYSIS CATHETER LAPAROSCOPIC;  Surgeon: Edy Glover MD;  Location: [facility] OR;  Service: General;  Laterality: N/A;   • KNEE ARTHROSCOPY Right    • ORIF TIBIA FRACTURE Right 2018    Procedure: TIBIAL   FRACTURE OPEN REDUCTION INTERNAL FIXATION;  Surgeon: Jung Barragan MD;  Location: [facility];  Service: Orthopedics   • TOE AMPUTATION Right     5th   • TUBAL ABDOMINAL LIGATION         Impression:   WFL speech, language and cognitive skills.    SLP Recommendation and Plan       Recommendations: No further formal SLP f/u recommended for s/l and cognitive skills. Pt will participate in further dysphagia evaluation. Please see full dysphagia note for details.    D/w pt results and recommendations w/ verbal understanding and agreement.    D/w RN results and recommendations w/ verbal understanding and agreement.     Thank you for allowing me to participate in the care of your patient-  Brie Solitario MS, CCC-SLP       EDUCATION  The patient has been educated in the following areas:     Cognitive Impairment Communication Impairment.                      Time Calculation:                        Brie Solitario MS CCC-SLP  7/4/2022

## 2022-07-04 NOTE — PLAN OF CARE
Goal Outcome Evaluation:  Plan of Care Reviewed With: patient        Progress: improving  Outcome Evaluation: Pt A&O x3, remains on room air. SBP continues to run >200. PRN clonidine given per provider order, see MAR. Will continue to monitor.

## 2022-07-05 ENCOUNTER — APPOINTMENT (OUTPATIENT)
Dept: NUCLEAR MEDICINE | Facility: HOSPITAL | Age: 62
End: 2022-07-05

## 2022-07-05 LAB
ALBUMIN SERPL-MCNC: 2.47 G/DL (ref 3.5–5.2)
ALBUMIN/GLOB SERPL: 1.3 G/DL
ALP SERPL-CCNC: 85 U/L (ref 39–117)
ALT SERPL W P-5'-P-CCNC: 12 U/L (ref 1–33)
ANION GAP SERPL CALCULATED.3IONS-SCNC: 8.5 MMOL/L (ref 5–15)
AST SERPL-CCNC: 16 U/L (ref 1–32)
BASOPHILS # BLD AUTO: 0.04 10*3/MM3 (ref 0–0.2)
BASOPHILS NFR BLD AUTO: 0.6 % (ref 0–1.5)
BILIRUB SERPL-MCNC: 0.2 MG/DL (ref 0–1.2)
BUN SERPL-MCNC: 40 MG/DL (ref 8–23)
BUN/CREAT SERPL: 12.8 (ref 7–25)
CALCIUM SPEC-SCNC: 7.8 MG/DL (ref 8.6–10.5)
CHLORIDE SERPL-SCNC: 101 MMOL/L (ref 98–107)
CO2 SERPL-SCNC: 26.5 MMOL/L (ref 22–29)
CREAT SERPL-MCNC: 3.12 MG/DL (ref 0.57–1)
DEPRECATED RDW RBC AUTO: 43.5 FL (ref 37–54)
EGFRCR SERPLBLD CKD-EPI 2021: 16.1 ML/MIN/1.73
EOSINOPHIL # BLD AUTO: 0.27 10*3/MM3 (ref 0–0.4)
EOSINOPHIL NFR BLD AUTO: 4 % (ref 0.3–6.2)
ERYTHROCYTE [DISTWIDTH] IN BLOOD BY AUTOMATED COUNT: 13.6 % (ref 12.3–15.4)
GLOBULIN UR ELPH-MCNC: 1.8 GM/DL
GLUCOSE BLDC GLUCOMTR-MCNC: 109 MG/DL (ref 70–130)
GLUCOSE BLDC GLUCOMTR-MCNC: 156 MG/DL (ref 70–130)
GLUCOSE BLDC GLUCOMTR-MCNC: 201 MG/DL (ref 70–130)
GLUCOSE SERPL-MCNC: 194 MG/DL (ref 65–99)
HAV IGM SERPL QL IA: NORMAL
HBV CORE IGM SERPL QL IA: NORMAL
HBV SURFACE AG SERPL QL IA: NORMAL
HCT VFR BLD AUTO: 26.7 % (ref 34–46.6)
HCV AB SER DONR QL: NORMAL
HGB BLD-MCNC: 8.7 G/DL (ref 12–15.9)
IMM GRANULOCYTES # BLD AUTO: 0.02 10*3/MM3 (ref 0–0.05)
IMM GRANULOCYTES NFR BLD AUTO: 0.3 % (ref 0–0.5)
LYMPHOCYTES # BLD AUTO: 2.12 10*3/MM3 (ref 0.7–3.1)
LYMPHOCYTES NFR BLD AUTO: 31.3 % (ref 19.6–45.3)
MAGNESIUM SERPL-MCNC: 1.4 MG/DL (ref 1.6–2.4)
MCH RBC QN AUTO: 28.9 PG (ref 26.6–33)
MCHC RBC AUTO-ENTMCNC: 32.6 G/DL (ref 31.5–35.7)
MCV RBC AUTO: 88.7 FL (ref 79–97)
MONOCYTES # BLD AUTO: 0.5 10*3/MM3 (ref 0.1–0.9)
MONOCYTES NFR BLD AUTO: 7.4 % (ref 5–12)
NEUTROPHILS NFR BLD AUTO: 3.82 10*3/MM3 (ref 1.7–7)
NEUTROPHILS NFR BLD AUTO: 56.4 % (ref 42.7–76)
NRBC BLD AUTO-RTO: 0 /100 WBC (ref 0–0.2)
PHOSPHATE SERPL-MCNC: 2.7 MG/DL (ref 2.5–4.5)
PLATELET # BLD AUTO: 109 10*3/MM3 (ref 140–450)
PMV BLD AUTO: 9.7 FL (ref 6–12)
POTASSIUM SERPL-SCNC: 3.7 MMOL/L (ref 3.5–5.2)
PROT SERPL-MCNC: 4.3 G/DL (ref 6–8.5)
QT INTERVAL: 460 MS
QTC INTERVAL: 524 MS
RBC # BLD AUTO: 3.01 10*6/MM3 (ref 3.77–5.28)
SODIUM SERPL-SCNC: 136 MMOL/L (ref 136–145)
WBC NRBC COR # BLD: 6.77 10*3/MM3 (ref 3.4–10.8)

## 2022-07-05 PROCEDURE — 83735 ASSAY OF MAGNESIUM: CPT | Performed by: INTERNAL MEDICINE

## 2022-07-05 PROCEDURE — 25010000002 ONDANSETRON PER 1 MG: Performed by: STUDENT IN AN ORGANIZED HEALTH CARE EDUCATION/TRAINING PROGRAM

## 2022-07-05 PROCEDURE — 78264 GASTRIC EMPTYING IMG STUDY: CPT | Performed by: RADIOLOGY

## 2022-07-05 PROCEDURE — 80074 ACUTE HEPATITIS PANEL: CPT | Performed by: INTERNAL MEDICINE

## 2022-07-05 PROCEDURE — 0 TECHNETIUM SULFUR COLLOID: Performed by: INTERNAL MEDICINE

## 2022-07-05 PROCEDURE — 82962 GLUCOSE BLOOD TEST: CPT

## 2022-07-05 PROCEDURE — 85025 COMPLETE CBC W/AUTO DIFF WBC: CPT | Performed by: STUDENT IN AN ORGANIZED HEALTH CARE EDUCATION/TRAINING PROGRAM

## 2022-07-05 PROCEDURE — 99232 SBSQ HOSP IP/OBS MODERATE 35: CPT | Performed by: INTERNAL MEDICINE

## 2022-07-05 PROCEDURE — A9541 TC99M SULFUR COLLOID: HCPCS | Performed by: INTERNAL MEDICINE

## 2022-07-05 PROCEDURE — 93005 ELECTROCARDIOGRAM TRACING: CPT | Performed by: INTERNAL MEDICINE

## 2022-07-05 PROCEDURE — 25010000002 MAGNESIUM SULFATE 2 GM/50ML SOLUTION: Performed by: INTERNAL MEDICINE

## 2022-07-05 PROCEDURE — 63710000001 INSULIN ASPART PER 5 UNITS: Performed by: INTERNAL MEDICINE

## 2022-07-05 PROCEDURE — 25010000002 METOCLOPRAMIDE PER 10 MG: Performed by: INTERNAL MEDICINE

## 2022-07-05 PROCEDURE — 84100 ASSAY OF PHOSPHORUS: CPT | Performed by: INTERNAL MEDICINE

## 2022-07-05 PROCEDURE — 97166 OT EVAL MOD COMPLEX 45 MIN: CPT

## 2022-07-05 PROCEDURE — 80053 COMPREHEN METABOLIC PANEL: CPT | Performed by: INTERNAL MEDICINE

## 2022-07-05 PROCEDURE — 78264 GASTRIC EMPTYING IMG STUDY: CPT

## 2022-07-05 RX ORDER — MAGNESIUM SULFATE HEPTAHYDRATE 40 MG/ML
2 INJECTION, SOLUTION INTRAVENOUS ONCE
Status: COMPLETED | OUTPATIENT
Start: 2022-07-05 | End: 2022-07-05

## 2022-07-05 RX ORDER — METOCLOPRAMIDE HYDROCHLORIDE 5 MG/ML
5 INJECTION INTRAMUSCULAR; INTRAVENOUS ONCE
Status: COMPLETED | OUTPATIENT
Start: 2022-07-05 | End: 2022-07-05

## 2022-07-05 RX ADMIN — FLUOXETINE HYDROCHLORIDE 40 MG: 20 CAPSULE ORAL at 09:02

## 2022-07-05 RX ADMIN — MAGNESIUM SULFATE HEPTAHYDRATE 2 G: 2 INJECTION, SOLUTION INTRAVENOUS at 09:01

## 2022-07-05 RX ADMIN — Medication 10 ML: at 09:03

## 2022-07-05 RX ADMIN — TECHNETIUM TC 99M SULFUR COLLOID 1 DOSE: KIT at 13:44

## 2022-07-05 RX ADMIN — TIZANIDINE 2 MG: 4 TABLET ORAL at 23:59

## 2022-07-05 RX ADMIN — LEVOTHYROXINE SODIUM 25 MCG: 50 TABLET ORAL at 06:05

## 2022-07-05 RX ADMIN — Medication 1 TABLET: at 09:02

## 2022-07-05 RX ADMIN — Medication 10 ML: at 20:38

## 2022-07-05 RX ADMIN — ONDANSETRON 4 MG: 2 INJECTION INTRAMUSCULAR; INTRAVENOUS at 09:02

## 2022-07-05 RX ADMIN — HYDROCODONE BITARTRATE AND ACETAMINOPHEN 1 TABLET: 10; 325 TABLET ORAL at 01:55

## 2022-07-05 RX ADMIN — HYDROCODONE BITARTRATE AND ACETAMINOPHEN 1 TABLET: 10; 325 TABLET ORAL at 20:38

## 2022-07-05 RX ADMIN — ROPINIROLE HYDROCHLORIDE 0.5 MG: 0.25 TABLET, FILM COATED ORAL at 09:01

## 2022-07-05 RX ADMIN — HYDROXYZINE HYDROCHLORIDE 25 MG: 25 TABLET ORAL at 01:55

## 2022-07-05 RX ADMIN — CLONIDINE HYDROCHLORIDE 0.1 MG: 0.1 TABLET ORAL at 23:56

## 2022-07-05 RX ADMIN — Medication 10 ML: at 09:02

## 2022-07-05 RX ADMIN — CETIRIZINE HYDROCHLORIDE 5 MG: 10 TABLET, FILM COATED ORAL at 09:02

## 2022-07-05 RX ADMIN — FOLIC ACID 1 MG: 1 TABLET ORAL at 09:02

## 2022-07-05 RX ADMIN — ATORVASTATIN CALCIUM 80 MG: 40 TABLET, FILM COATED ORAL at 20:38

## 2022-07-05 RX ADMIN — CLONIDINE HYDROCHLORIDE 0.1 MG: 0.1 TABLET ORAL at 11:15

## 2022-07-05 RX ADMIN — Medication 100 MG: at 09:01

## 2022-07-05 RX ADMIN — ROPINIROLE HYDROCHLORIDE 0.5 MG: 0.25 TABLET, FILM COATED ORAL at 20:38

## 2022-07-05 RX ADMIN — METOCLOPRAMIDE 5 MG: 5 INJECTION, SOLUTION INTRAMUSCULAR; INTRAVENOUS at 10:34

## 2022-07-05 RX ADMIN — INSULIN ASPART 3 UNITS: 100 INJECTION, SOLUTION INTRAVENOUS; SUBCUTANEOUS at 09:04

## 2022-07-05 RX ADMIN — PANTOPRAZOLE SODIUM 40 MG: 40 TABLET, DELAYED RELEASE ORAL at 06:05

## 2022-07-05 RX ADMIN — ASPIRIN 81 MG: 81 TABLET, COATED ORAL at 09:02

## 2022-07-05 NOTE — CONSULTS
"Diabetes Education  Assessment/Teaching    Patient Name:  Reny Elena  YOB: 1960  MRN: [id number]  Admit Date:  7/1/2022      Assessment Date:  7/5/2022  Flowsheet Row Most Recent Value   General Information     Height 165.1 cm (65\")   Height Method Stated   Weight 57 kg (125 lb 11.2 oz)   Weight Method Bed scale   Pregnancy Assessment    Diabetes History    Education Preferences    Nutrition Information    Assessment Topics    DM Goals           Flowsheet Row Most Recent Value   DM Education Needs    Meter Has own   Frequency of Testing 3 times a day   Medication Insulin   Problem Solving Hypoglycemia, Hyperglycemia, Sick days, Signs, Symptoms, Treatment   Reducing Risks Cardiovascular, Immunizations, Foot care, Eye exam, Blood pressure, Neuropathy, A1C testing, Lipids, Retinopathy, Dental exam   Healthy Eating Basic meal plan provided   Physical Activity Walking   Physical Activity Frequency Occasionally   Healthy Coping Appropriate   Discharge Plan Home, Follow-up with PCP   Motivation Moderate   Teaching Method Explanation, Discussion, Handouts   Patient Response Verbalized understanding            Other Comments:  A1C 7.6 Patient did not wear a mask. Patient was educated and received AADE7 and ADA handouts on diet, activity, checking blood glucose, taking medication as prescribed, checking feet daily and S/S of hypo/hyperglycemia. Patient was educated on sick rule days. Patient had no questions or concerns. Please re-consult or call for concerns or questions. Thank you.         Electronically signed by:  Kalpana Suarez RN  07/05/22 11:08 EDT  "

## 2022-07-05 NOTE — PROGRESS NOTES
[facility] HOSPITALIST PROGRESS NOTE    Subjective     History:   Reny Elena is a 62 y.o. female admitted on 7/1/2022 secondary to <principal problem not specified>     Procedures:   7/2/22: Right IJ central line placement    CC: Follow up CVA    Patient seen and examined with GLORIA Grey. Awake and alert. States she feels slightly improved today. Reports nausea with no reported vomiting. Reports HA but improved. Continues to report ongoing vision changes. No reported CP or dyspnea. Reports some palpitations. BP remaining intermittently elevated.     History taken from: patient, chart, and RN.      Objective     Vital Signs  Temp:  [98.3 °F (36.8 °C)-98.9 °F (37.2 °C)] 98.3 °F (36.8 °C)  Heart Rate:  [72-93] 84  Resp:  [11-25] 25  BP: (115-238)/() 238/107    Intake/Output Summary (Last 24 hours) at 7/5/2022 1150  Last data filed at 7/5/2022 1100  Gross per 24 hour   Intake 50 ml   Output 1003 ml   Net -953 ml         Physical Exam: Unchanged from previous.   General:    Awake, alert, in no acute distress, chronically ill appearing    Heart:      Normal S1 and S2. Regular rate and rhythm. No significant murmur, rubs or gallops appreciated.   Lungs:     Respirations regular, even and unlabored. Lungs clear to auscultation B/L. No wheezes, rales or rhonchi.   Abdomen:   Soft and nontender. No guarding, rebound tenderness or  organomegaly noted. Bowel sounds present x 4.   Extremities:  No clubbing, cyanosis or edema noted. Moves UE and LE equally B/L with generalized weakness.      Results Review:    Results from last 7 days   Lab Units 07/05/22  0019 07/04/22  0035 07/03/22  1445 07/03/22  0022 07/02/22  0512 07/01/22  1727   WBC 10*3/mm3 6.77 7.10 7.83 8.28 6.62 4.43   HEMOGLOBIN g/dL 8.7* 8.8* 8.5* 9.8* 11.2* 11.8*   PLATELETS 10*3/mm3 109* 129* 137* 138* 152 131*     Results from last 7 days   Lab Units 07/05/22  0019 07/04/22  0035 07/03/22  1445 07/03/22  0022 07/02/22  0512  07/01/22  1727   SODIUM mmol/L 136 135* 134* 141 142 138   POTASSIUM mmol/L 3.7 2.9* 3.5 3.3* 3.4* 3.8   CHLORIDE mmol/L 101 99 98 101 101 100   CO2 mmol/L 26.5 26.7 27.2 26.7 25.1 25.4   BUN mg/dL 40* 42* 43* 48* 42* 37*   CREATININE mg/dL 3.12* 3.31* 3.40* 3.82* 3.78* 3.38*   CALCIUM mg/dL 7.8* 7.9* 7.8* 8.1* 8.7 8.6   GLUCOSE mg/dL 194* 281* 233* 264* 219* 206*     Results from last 7 days   Lab Units 07/05/22  0019 07/03/22  1445 07/01/22  1727   BILIRUBIN mg/dL 0.2 0.3 0.5   ALK PHOS U/L 85 63 82   AST (SGOT) U/L 16 16 22   ALT (SGPT) U/L 12 11 16     Results from last 7 days   Lab Units 07/05/22  0019 07/02/22  0512 07/01/22  1931   MAGNESIUM mg/dL 1.4* 1.6 1.5*     Results from last 7 days   Lab Units 07/01/22  1727   INR  0.97     Results from last 7 days   Lab Units 07/02/22  0512 07/01/22  1931 07/01/22  1727   TROPONIN T ng/mL 0.071* 0.081* 0.085*       Imaging Results (Last 24 Hours)     ** No results found for the last 24 hours. **            Medications:  aspirin, 81 mg, Oral, Daily  atorvastatin, 80 mg, Oral, Nightly  cetirizine, 5 mg, Oral, Daily  docusate sodium, 100 mg, Oral, BID  FLUoxetine, 40 mg, Oral, Daily  folic acid, 1 mg, Oral, Daily  gabapentin, 300 mg, Oral, Daily  Insulin Aspart, 0-7 Units, Subcutaneous, TID AC  levothyroxine, 25 mcg, Oral, Q AM  multivitamin, 1 tablet, Oral, Daily  pantoprazole, 40 mg, Oral, Q AM  rOPINIRole, 0.5 mg, Oral, BID  sodium chloride, 10 mL, Intravenous, Q12H  sodium chloride, 10 mL, Intravenous, Q12H  sodium chloride, 10 mL, Intravenous, Q12H  sodium chloride, 10 mL, Intravenous, Q12H  sodium chloride, 10 mL, Intravenous, Q12H  vitamin B-1, 100 mg, Oral, Daily      norepinephrine, 0.02-0.3 mcg/kg/min, Last Rate: Stopped (07/03/22 1801)            Assessment & Plan    Acute to subacute ischemic strokes: Concern for possible cardioembolic etiology vs small vessel. CT head reveals atrophy and chronic microangiopathic change. MRI reveals a few small patchy foci  of acute vs early subacute infarct involving the left frontal and left cerebellar hemisphere with extensive presumed chronic microvascular ischemic changes with multiple old lacunar infarcts. No significant stenosis noted on MRA. Echo reveals an EF of 51-55%, grade I diastolic dysfunction, trace AR and mild AS with negative saline test. Cont ASA and statin. PT/OT and SLP. Will need outpatient event monitor. Teleneurology has signed off with input appreciated.     Hypertensive urgency: Improved and weaned off Cardizem gtt. Received home Coreg and started on Procardia with subsequent hypotension requiring vasopressor support. Concern for severe orthostatic hypotension with supine HTN. Holding scheduled antihypertensives. Cont PRN Clonidine per nephrology recs. Cont to monitor BP closely.     Nausea and vomiting with recent weight loss: Concern for possible gastroparesis. No acute findings on CT. Neurology recommended LP with concern for possible hypercoagulability/underlying malignancy in the setting of CVA's. However, pt is respectfully declining LP and expresses understanding of outlined concerns. No obvious malignancy on imaging at present. Order gastric emptying study. May benefit from upper GI eval. Cont supportive treatment.     Recent traumatic subdural hematoma: No evidence of subdural hematoma on imaging. Cont supportive treatment with close monitoring.     Hypokalemia: K+ improved today. Mg<2 and replaced. PD per nephrology. Cont to monitor.      Ongoing vision changes: Pt reports ongoing vision changes for the last month. Imaging as above. Consult ophthalmology.     ESRD on PD: Cont PD per nephrology with input appreciated.     DM II, non-insulin dependent with neuropathy: HgbA1c 7.6. Episode of hypoglycemia on 7/3 with no further episodes reported. Cont Accuchecks.     Chronic diastolic CHF: Echo as above. Appears compensated at present. PD per nephrology. Monitor volume status.     Chronic normocytic  anemia: Possibly anemia of CKD. No obvious signs of bleeding. Cont to monitor.     Hyperlipidemia: Cont statin.     Hypothyroidism: TSH is elevated with normal free T4. Cont levothyroxine.     PAD: Cont ASA and statin.     DVT PPX: SCD's    Pt is at high risk 2/2 acute CVA, hypertensive urgency, DM with hypoglycemia, electrolyte abnormalities, recent subdural hematoma, CHF, ESRD on PD, unintentional weight loss.     Disposition: May require short term rehab placement.       Mark Crawford DO  07/05/22  11:50 EDT

## 2022-07-05 NOTE — PLAN OF CARE
Goal Outcome Evaluation:   Pt alert and oriented x 4. Pt's b/p has been elevated during this shift. Prn meds given per order. B/p stable at this time. No other changes this shift.  Safety maintained.

## 2022-07-05 NOTE — CASE MANAGEMENT/SOCIAL WORK
Discharge Planning Assessment   [place]     Patient Name: Reny Elena  MRN: [id number]  Today's Date: 7/5/2022    Admit Date: 7/1/2022     Discharge Needs Assessment     Row Name 07/05/22 1309       Living Environment    People in Home significant other    Name(s) of People in Home Cliff Leiva    Current Living Arrangements home    Primary Care Provided by self;spouse/significant other    Provides Primary Care For no one    Family Caregiver if Needed significant other    Family Caregiver Names Cliff    Quality of Family Relationships helpful;involved;supportive    Able to Return to Prior Arrangements yes       Resource/Environmental Concerns    Resource/Environmental Concerns none    Transportation Concerns none       Transition Planning    Patient/Family Anticipates Transition to home with family    Patient/Family Anticipated Services at Transition none    Transportation Anticipated family or friend will provide       Discharge Needs Assessment    Equipment Currently Used at Home bath bench;shower chair;commode;glucometer;wheelchair;walker, standard;cane, straight;other (see comments)  home PD supplies    Concerns to be Addressed no discharge needs identified    Anticipated Changes Related to Illness none    Equipment Needed After Discharge none               Discharge Plan     Row Name 07/05/22 1311       Plan    Plan SS spoke with pt on this date for discharge planning. Pt lives at home with significant other Cliff and does not utilize home health services. Pt has a bath bench, glucometer, shower chair, bedside commode, wheelchair, standard walker, and cane via [employer]. Pt does home PD, significant other assists. PCP is Susan Stephens. Pt does not have a POA or living will on file. Pt plans to return home at discharge, significant other to provide transportation. SS to follow and assist.    13:33: Pt discussed in multidisciplinary rounds, plan to place inpatient rehab consult.    Patient/Family in Agreement  with Plan yes               Demographic Summary     Row Name 07/05/22 3354       General Information    Referral Source nursing    Reason for Consult --  CVA. assist with safe dc plan/needs.              DELROY Son

## 2022-07-05 NOTE — PLAN OF CARE
Goal Outcome Evaluation:  Plan of Care Reviewed With: patient        Progress: improving  Outcome Evaluation: Pt A&O. SBP continues to run >200. PRN clonidine given for SBP. Pt went for gastric emptying today. Will continue to monitor.

## 2022-07-05 NOTE — PROGRESS NOTES
Nephrology Progress Note      Subjective     Patient denied any chest pain or shortness of breath    Objective       Vital signs :     Temp:  [98.6 °F (37 °C)-99.1 °F (37.3 °C)] 98.6 °F (37 °C)  Heart Rate:  [72-93] 77  Resp:  [10-25] 25  BP: (115-234)/() 176/89      Intake/Output Summary (Last 24 hours) at 7/5/2022 0747  Last data filed at 7/5/2022 0650  Gross per 24 hour   Intake 290 ml   Output 803 ml   Net -513 ml       Physical Exam:    General Appearance : not in acute distress  Lungs : clear to auscultation, respirations regular  Heart :  regular rhythm & normal rate, normal S1, S2 and no murmur, no rub  Abdomen : normal bowel sounds, no masses, no hepatomegaly, no splenomegaly, soft non-tender and no guarding  Extremities : no edema, no cyanosis and no redness  Neurologic :   orientated to person, place, time and situation, Grossly no focal deficits      Laboratory Data :     Albumin Albumin   Date Value Ref Range Status   07/05/2022 2.47 (L) 3.50 - 5.20 g/dL Final   07/03/2022 2.45 (L) 3.50 - 5.20 g/dL Final      Magnesium Magnesium   Date Value Ref Range Status   07/05/2022 1.4 (L) 1.6 - 2.4 mg/dL Final          PTH               No results found for: PTH    CBC and coagulation:  Results from last 7 days   Lab Units 07/05/22  0019 07/04/22  0035 07/03/22  1727 07/03/22  1445 07/03/22  0022 07/02/22  0512 07/01/22  1727   PROCALCITONIN ng/mL  --   --   --   --  0.27*  --   --    LACTATE mmol/L  --   --  1.6 2.6*  --   --   --    SED RATE mm/hr  --   --   --   --   --  8  --    CRP mg/dL  --   --   --   --   --  <0.30  --    WBC 10*3/mm3 6.77 7.10  --  7.83 8.28 6.62 4.43   HEMOGLOBIN g/dL 8.7* 8.8*  --  8.5* 9.8* 11.2* 11.8*   HEMATOCRIT % 26.7* 26.9*  --  26.7* 30.4* 34.1 35.8   MCV fL 88.7 88.5  --  90.5 89.9 88.6 89.5   MCHC g/dL 32.6 32.7  --  31.8 32.2 32.8 33.0   PLATELETS 10*3/mm3 109* 129*  --  137* 138* 152 131*   INR   --   --   --   --   --   --  0.97     Acid/base balance:  Results from  last 7 days   Lab Units 07/01/22  1722   PH, ARTERIAL pH units 7.460*   PO2 ART mm Hg 75.3*   PCO2, ARTERIAL mm Hg 39.6   HCO3 ART mmol/L 28.1*     Renal and electrolytes:  Results from last 7 days   Lab Units 07/05/22  0019 07/04/22  0035 07/03/22  1445 07/03/22  0022 07/02/22  0512 07/01/22  1931   SODIUM mmol/L 136 135* 134* 141 142  --    POTASSIUM mmol/L 3.7 2.9* 3.5 3.3* 3.4*  --    MAGNESIUM mg/dL 1.4*  --   --   --  1.6 1.5*   CHLORIDE mmol/L 101 99 98 101 101  --    CO2 mmol/L 26.5 26.7 27.2 26.7 25.1  --    BUN mg/dL 40* 42* 43* 48* 42*  --    CREATININE mg/dL 3.12* 3.31* 3.40* 3.82* 3.78*  --    CALCIUM mg/dL 7.8* 7.9* 7.8* 8.1* 8.7  --    PHOSPHORUS mg/dL 2.7  --   --  4.7* 4.0  --      Estimated Creatinine Clearance: 16.4 mL/min (A) (by C-G formula based on SCr of 3.12 mg/dL (H)).    Liver and pancreatic function:  Results from last 7 days   Lab Units 07/05/22  0019 07/03/22  1445 07/01/22  1727   ALBUMIN g/dL 2.47* 2.45* 3.41*   BILIRUBIN mg/dL 0.2 0.3 0.5   ALK PHOS U/L 85 63 82   AST (SGOT) U/L 16 16 22   ALT (SGPT) U/L 12 11 16   LIPASE U/L  --   --  12*         Cardiac:      Liver and pancreatic function:  Results from last 7 days   Lab Units 07/05/22  0019 07/03/22  1445 07/01/22  1727   ALBUMIN g/dL 2.47* 2.45* 3.41*   BILIRUBIN mg/dL 0.2 0.3 0.5   ALK PHOS U/L 85 63 82   AST (SGOT) U/L 16 16 22   ALT (SGPT) U/L 12 11 16   LIPASE U/L  --   --  12*       Medications :     aspirin, 81 mg, Oral, Daily  atorvastatin, 80 mg, Oral, Nightly  cetirizine, 5 mg, Oral, Daily  docusate sodium, 100 mg, Oral, BID  FLUoxetine, 40 mg, Oral, Daily  folic acid, 1 mg, Oral, Daily  gabapentin, 300 mg, Oral, Daily  Insulin Aspart, 0-7 Units, Subcutaneous, TID AC  levothyroxine, 25 mcg, Oral, Q AM  magnesium sulfate, 2 g, Intravenous, Once  multivitamin, 1 tablet, Oral, Daily  pantoprazole, 40 mg, Oral, Q AM  rOPINIRole, 0.5 mg, Oral, BID  sodium chloride, 10 mL, Intravenous, Q12H  sodium chloride, 10 mL, Intravenous,  Q12H  sodium chloride, 10 mL, Intravenous, Q12H  sodium chloride, 10 mL, Intravenous, Q12H  sodium chloride, 10 mL, Intravenous, Q12H  vitamin B-1, 100 mg, Oral, Daily      norepinephrine, 0.02-0.3 mcg/kg/min, Last Rate: Stopped (07/03/22 1801)          Assessment & Plan     1.  End-stage renal disease on peritoneal dialysis  2.  Acute CVA left frontal lobe  3.  Severe orthostatic hypotension  4.  Supine hypertension  5.  Type 2 diabetes with neuropathy  6.  Anemia of CKD  7.  Vomiting possible gastroparesis     Continue dialysis, on nightly PDx      Nicholas Arroyo MD  07/05/22  07:47 EDT

## 2022-07-05 NOTE — SIGNIFICANT NOTE
07/05/22 1612   OTHER   Discipline physical therapist   Rehab Time/Intention   Session Not Performed patient/family declined treatment;patient unavailable for treatment   Recommendation   PT - Next Appointment   (PT spoke with pt in AM in which she wasn't feeling well to do therapy this date. PT came back in PM to inquire pt ability to participate, pt away for imaging/not in room.)

## 2022-07-05 NOTE — THERAPY EVALUATION
Acute Care - Occupational Therapy Initial Evaluation   [facility]     Patient Name: Reny Elena  : 1960  MRN: [id number]  Today's Date: 2022             Admit Date: 2022   Pt presents with BUE function equal with no reported/noted deficits unilaterally. BUE sensation equal for light touch. Visual tracking WFL. Pt presents with general weakness with decreased activity tolerance. OT will follow to enhance strength/endurance to enhance fxl performance in home environment. She currently resides with significant other and all AE/DME in place at home at this time.        ICD-10-CM ICD-9-CM   1. Cerebrovascular accident (CVA), unspecified mechanism ([facility])  I63.9 434.91   2. Hypotension, unspecified hypotension type  I95.9 458.9     Patient Active Problem List   Diagnosis   • Tibia/fibula fracture   • Iron deficiency anemia   • Type 2 diabetes mellitus with hyperglycemia, with long-term current use of insulin ([facility])   • Wound of right ankle   • Cellulitis   • Metabolic encephalopathy   • History of non-ST elevation myocardial infarction (NSTEMI)   • CKD (chronic kidney disease) stage 3, GFR 30-59 ml/min ([facility])   • Elevated troponin   • HTN (hypertension)   • CVA (cerebral vascular accident) ([facility])   • Chronic diastolic CHF (congestive heart failure) ([facility])   • Decubitus ulcer of coccyx, unspecified pressure ulcer stage   • Depression   • Expressive aphasia   • Impaired mobility and ADLs   • Renal failure   • Hyperkalemia   • Subdural hematoma ([facility])   • Severe malnutrition ([facility])   • Cerebrovascular accident (CVA), unspecified mechanism ([facility])     Past Medical History:   Diagnosis Date   • Arthritis    • Closed fracture of right fibula and tibia 2018   • Depression    • Diabetic ulcer of left foot associated with type 2 diabetes mellitus ([facility]) 2017   • Diastolic dysfunction    • Disease of thyroid gland    • Elevated cholesterol    • End stage renal disease ([facility])    • Essential hypertension    • GERD  (gastroesophageal reflux disease)    • History of transfusion    • Hyperlipidemia    • Iron deficiency anemia 2018   • PAD (peripheral artery disease) ([facility])    • Renal failure    • Type 2 diabetes mellitus with hyperglycemia, with long-term current use of insulin ([facility]) 2018     Past Surgical History:   Procedure Laterality Date   • ABDOMINAL SURGERY     • BACK SURGERY      Post spinal diskectomy, osteophytectomy in one lumbar interspace   • CATARACT EXTRACTION      Left    •  SECTION     • DENTAL PROCEDURE     • ENDOSCOPY     • FRACTURE SURGERY      right leg   • HYSTERECTOMY     • INCISION AND DRAINAGE LEG Left 4/15/2017    Procedure: INCISION AND DRAINAGE LOWER EXTREMITY;  Surgeon: Basilio Morris MD;  Location: [facility] OR;  Service:    • INCISION AND DRAINAGE LEG Left 2017    Procedure: Irrigation and Debridment abcess diabetic wound left foot with deep culture;  Surgeon: Basilio Morris MD;  Location: [facility] OR;  Service:    • INSERTION PERITONEAL DIALYSIS CATHETER N/A 2021    Procedure: INSERTION PERITONEAL DIALYSIS CATHETER LAPAROSCOPIC;  Surgeon: Edy Glover MD;  Location: [facility] OR;  Service: General;  Laterality: N/A;   • KNEE ARTHROSCOPY Right    • ORIF TIBIA FRACTURE Right 2018    Procedure: TIBIAL  FRACTURE OPEN REDUCTION INTERNAL FIXATION;  Surgeon: Jung Barragan MD;  Location: [facility] OR;  Service: Orthopedics   • TOE AMPUTATION Right     5th   • TUBAL ABDOMINAL LIGATION           OT ASSESSMENT FLOWSHEET (last 12 hours)     OT Evaluation and Treatment     Row Name 22 0712                   OT Time and Intention    Document Type evaluation  -KR        Mode of Treatment occupational therapy  -KR        Patient Effort adequate  -KR                  General Information    General Observations of Patient alert/cooperative  -KR        Prior Level of Function min assist:  -KR                  Living Environment    Current Living Arrangements home  -KR         People in Home significant other  -KR                  Home Use of Assistive/Adaptive Equipment    Equipment Currently Used at Home walker, rolling;commode;wheelchair  -KR                  Cognition    Affect/Mental Status (Cognition) WFL  -KR        Orientation Status (Cognition) oriented x 3  -KR        Follows Commands (Cognition) WFL  -KR                  Range of Motion Comprehensive    Comment, General Range of Motion BUE 4/5 (WFL)  -KR                  Strength Comprehensive (MMT)    Comment, General Manual Muscle Testing (MMT) Assessment BUE 3-/5  -KR                  Sensory    Additional Documentation Sensory Assessment (Somatosensory) (Group);Vision Assessment/Intervention (Group)  -KR                  Vision Assessment/Intervention    Impact of Vision Impairment on Function (Vision) visual tracking/depth perception intact, pt reports blurred vision since recent fall. Vision WFL for self care performance  -KR                  Sensory Assessment (Somatosensory)    Sensory Assessment (Somatosensory) --  no changes noted/reported BUE  -KR                  Activities of Daily Living    BADL Assessment/Intervention bathing;upper body dressing;lower body dressing;grooming;feeding;toileting  -KR                  Bathing Assessment/Intervention    Barber Level (Bathing) bathing skills;minimum assist (75% patient effort)  -KR                  Upper Body Dressing Assessment/Training    Barber Level (Upper Body Dressing) upper body dressing skills;minimum assist (75% patient effort)  -KR                  Lower Body Dressing Assessment/Training    Barber Level (Lower Body Dressing) lower body dressing skills;minimum assist (75% patient effort)  -KR                  Grooming Assessment/Training    Barber Level (Grooming) grooming skills;minimum assist (75% patient effort)  -KR                  Self-Feeding Assessment/Training    Barber Level (Feeding) feeding skills;set up  fxl components  only  -KR                  Toileting Assessment/Training    [place] Level (Toileting) toileting skills;minimum assist (75% patient effort)  -KR                  Plan of Care Review    Plan of Care Reviewed With patient  -KR                  Therapy Assessment/Plan (OT)    Planned Therapy Interventions (OT) activity tolerance training;strengthening exercise  -KR                  Therapy Plan Review/Discharge Plan (OT)    Anticipated Discharge Disposition (OT) home with assist  -KR                  OT Goals    Strength Goal Selection (OT) strength, OT goal 1  -KR        Activity Tolerance Goal Selection (OT) activity tolerance, OT goal 1  -KR                  Strength Goal 1 (OT)    Strength Goal 1 (OT) BUE increase x 1 to enhance self care/mobility performance  -KR        Time Frame (Strength Goal 1, OT) by discharge  -KR                   Activity Tolerance Goal 1 (OT)    Activity Tolerance Goal 1 (OT) Increase to enhance safety/independence in home environment  -KR        Activity Level (Endurance Goal 1, OT) 15 min activity  -KR        Time Frame (Activity Tolerance Goal 1, OT) by discharge  -KR              User Key  (r) = Recorded By, (t) = Taken By, (c) = Cosigned By    Initials Name Effective Dates    KR Helder Cam, OT 06/16/21 -                        OT Recommendation and Plan  Planned Therapy Interventions (OT): activity tolerance training, strengthening exercise  Plan of Care Review  Plan of Care Reviewed With: patient  Plan of Care Reviewed With: patient     Outcome Measures     Row Name 07/05/22 1122 07/05/22 1100          Modified Edilia Scale    Pre-Stroke Modified Edilia Scale 3 - Moderate disability.  Requiring some help, but able to walk without assistance.  -KR --     Modified Edilia Scale 4 - Moderately severe disability.  Unable to walk without assistance, and unable to attend to own bodily needs without assistance.  -KR --            Functional Assessment    Outcome Measure Options --  Modified Edilia COSBY           User Key  (r) = Recorded By, (t) = Taken By, (c) = Cosigned By    Initials Name Provider Type    Helder Panda OT Occupational Therapist                Time Calculation:     Therapy Charges for Today     Code Description Service Date Service Provider Modifiers Qty    [id number] HC OT EVAL MOD COMPLEXITY 4 7/5/2022 Helder Cam OT GO 1               Helder Cam OT  7/5/2022

## 2022-07-06 LAB
ANION GAP SERPL CALCULATED.3IONS-SCNC: 6.8 MMOL/L (ref 5–15)
BASOPHILS # BLD AUTO: 0.02 10*3/MM3 (ref 0–0.2)
BASOPHILS NFR BLD AUTO: 0.5 % (ref 0–1.5)
BUN SERPL-MCNC: 41 MG/DL (ref 8–23)
BUN/CREAT SERPL: 14 (ref 7–25)
CALCIUM SPEC-SCNC: 7.1 MG/DL (ref 8.6–10.5)
CHLORIDE SERPL-SCNC: 100 MMOL/L (ref 98–107)
CO2 SERPL-SCNC: 27.2 MMOL/L (ref 22–29)
CREAT SERPL-MCNC: 2.93 MG/DL (ref 0.57–1)
DEPRECATED RDW RBC AUTO: 45.1 FL (ref 37–54)
EGFRCR SERPLBLD CKD-EPI 2021: 17.4 ML/MIN/1.73
EOSINOPHIL # BLD AUTO: 0.16 10*3/MM3 (ref 0–0.4)
EOSINOPHIL NFR BLD AUTO: 4 % (ref 0.3–6.2)
ERYTHROCYTE [DISTWIDTH] IN BLOOD BY AUTOMATED COUNT: 13.7 % (ref 12.3–15.4)
GLUCOSE BLDC GLUCOMTR-MCNC: 295 MG/DL (ref 70–130)
GLUCOSE BLDC GLUCOMTR-MCNC: 395 MG/DL (ref 70–130)
GLUCOSE BLDC GLUCOMTR-MCNC: 425 MG/DL (ref 70–130)
GLUCOSE BLDC GLUCOMTR-MCNC: 75 MG/DL (ref 70–130)
GLUCOSE SERPL-MCNC: 407 MG/DL (ref 65–99)
HCT VFR BLD AUTO: 23.3 % (ref 34–46.6)
HGB BLD-MCNC: 7.4 G/DL (ref 12–15.9)
IMM GRANULOCYTES # BLD AUTO: 0.02 10*3/MM3 (ref 0–0.05)
IMM GRANULOCYTES NFR BLD AUTO: 0.5 % (ref 0–0.5)
LYMPHOCYTES # BLD AUTO: 1.32 10*3/MM3 (ref 0.7–3.1)
LYMPHOCYTES NFR BLD AUTO: 32.7 % (ref 19.6–45.3)
MAGNESIUM SERPL-MCNC: 1.8 MG/DL (ref 1.6–2.4)
MCH RBC QN AUTO: 28.9 PG (ref 26.6–33)
MCHC RBC AUTO-ENTMCNC: 31.8 G/DL (ref 31.5–35.7)
MCV RBC AUTO: 91 FL (ref 79–97)
MONOCYTES # BLD AUTO: 0.28 10*3/MM3 (ref 0.1–0.9)
MONOCYTES NFR BLD AUTO: 6.9 % (ref 5–12)
NEUTROPHILS NFR BLD AUTO: 2.24 10*3/MM3 (ref 1.7–7)
NEUTROPHILS NFR BLD AUTO: 55.4 % (ref 42.7–76)
NRBC BLD AUTO-RTO: 0 /100 WBC (ref 0–0.2)
PLATELET # BLD AUTO: 88 10*3/MM3 (ref 140–450)
PMV BLD AUTO: 10.4 FL (ref 6–12)
POTASSIUM SERPL-SCNC: 3.7 MMOL/L (ref 3.5–5.2)
RBC # BLD AUTO: 2.56 10*6/MM3 (ref 3.77–5.28)
SODIUM SERPL-SCNC: 134 MMOL/L (ref 136–145)
WBC NRBC COR # BLD: 4.04 10*3/MM3 (ref 3.4–10.8)

## 2022-07-06 PROCEDURE — 25010000002 MAGNESIUM SULFATE IN D5W 1G/100ML (PREMIX) 1-5 GM/100ML-% SOLUTION: Performed by: INTERNAL MEDICINE

## 2022-07-06 PROCEDURE — 97530 THERAPEUTIC ACTIVITIES: CPT

## 2022-07-06 PROCEDURE — 25010000002 PROCHLORPERAZINE 10 MG/2ML SOLUTION: Performed by: INTERNAL MEDICINE

## 2022-07-06 PROCEDURE — 83735 ASSAY OF MAGNESIUM: CPT | Performed by: INTERNAL MEDICINE

## 2022-07-06 PROCEDURE — 99232 SBSQ HOSP IP/OBS MODERATE 35: CPT | Performed by: INTERNAL MEDICINE

## 2022-07-06 PROCEDURE — 63710000001 INSULIN ASPART PER 5 UNITS: Performed by: INTERNAL MEDICINE

## 2022-07-06 PROCEDURE — 25010000002 ONDANSETRON PER 1 MG: Performed by: STUDENT IN AN ORGANIZED HEALTH CARE EDUCATION/TRAINING PROGRAM

## 2022-07-06 PROCEDURE — 85025 COMPLETE CBC W/AUTO DIFF WBC: CPT | Performed by: STUDENT IN AN ORGANIZED HEALTH CARE EDUCATION/TRAINING PROGRAM

## 2022-07-06 PROCEDURE — 82962 GLUCOSE BLOOD TEST: CPT

## 2022-07-06 PROCEDURE — 80048 BASIC METABOLIC PNL TOTAL CA: CPT | Performed by: STUDENT IN AN ORGANIZED HEALTH CARE EDUCATION/TRAINING PROGRAM

## 2022-07-06 PROCEDURE — 63710000001 INSULIN ASPART PER 5 UNITS: Performed by: HOSPITALIST

## 2022-07-06 RX ORDER — METOCLOPRAMIDE 10 MG/1
5 TABLET ORAL
Status: DISCONTINUED | OUTPATIENT
Start: 2022-07-06 | End: 2022-07-10 | Stop reason: HOSPADM

## 2022-07-06 RX ORDER — DOCOSANOL 100 MG/G
1 CREAM TOPICAL
Status: DISCONTINUED | OUTPATIENT
Start: 2022-07-06 | End: 2022-07-10 | Stop reason: HOSPADM

## 2022-07-06 RX ORDER — MAGNESIUM SULFATE 1 G/100ML
1 INJECTION INTRAVENOUS ONCE
Status: COMPLETED | OUTPATIENT
Start: 2022-07-06 | End: 2022-07-06

## 2022-07-06 RX ORDER — INSULIN ASPART 100 [IU]/ML
5 INJECTION, SOLUTION INTRAVENOUS; SUBCUTANEOUS ONCE
Status: COMPLETED | OUTPATIENT
Start: 2022-07-06 | End: 2022-07-06

## 2022-07-06 RX ORDER — DIPHENHYDRAMINE HYDROCHLORIDE AND LIDOCAINE HYDROCHLORIDE AND ALUMINUM HYDROXIDE AND MAGNESIUM HYDRO
10 KIT EVERY 6 HOURS
Status: DISCONTINUED | OUTPATIENT
Start: 2022-07-06 | End: 2022-07-10 | Stop reason: HOSPADM

## 2022-07-06 RX ADMIN — HYDROCODONE BITARTRATE AND ACETAMINOPHEN 1 TABLET: 10; 325 TABLET ORAL at 16:19

## 2022-07-06 RX ADMIN — ATORVASTATIN CALCIUM 80 MG: 40 TABLET, FILM COATED ORAL at 21:08

## 2022-07-06 RX ADMIN — FLUOXETINE HYDROCHLORIDE 40 MG: 20 CAPSULE ORAL at 08:17

## 2022-07-06 RX ADMIN — Medication 10 ML: at 08:19

## 2022-07-06 RX ADMIN — INSULIN ASPART 6 UNITS: 100 INJECTION, SOLUTION INTRAVENOUS; SUBCUTANEOUS at 08:18

## 2022-07-06 RX ADMIN — Medication 10 ML: at 21:09

## 2022-07-06 RX ADMIN — METOCLOPRAMIDE 5 MG: 10 TABLET ORAL at 17:21

## 2022-07-06 RX ADMIN — Medication 0.02 MCG/KG/MIN: at 02:07

## 2022-07-06 RX ADMIN — TIZANIDINE 2 MG: 4 TABLET ORAL at 21:14

## 2022-07-06 RX ADMIN — FOLIC ACID 1 MG: 1 TABLET ORAL at 08:17

## 2022-07-06 RX ADMIN — Medication 10 ML: at 21:10

## 2022-07-06 RX ADMIN — DIPHENHYDRAMINE HYDROCHLORIDE AND LIDOCAINE HYDROCHLORIDE AND ALUMINUM HYDROXIDE AND MAGNESIUM HYDRO 10 ML: KIT at 23:12

## 2022-07-06 RX ADMIN — ONDANSETRON 4 MG: 2 INJECTION INTRAMUSCULAR; INTRAVENOUS at 08:26

## 2022-07-06 RX ADMIN — ROPINIROLE HYDROCHLORIDE 0.5 MG: 0.25 TABLET, FILM COATED ORAL at 08:18

## 2022-07-06 RX ADMIN — PANTOPRAZOLE SODIUM 40 MG: 40 TABLET, DELAYED RELEASE ORAL at 05:28

## 2022-07-06 RX ADMIN — METOCLOPRAMIDE 5 MG: 10 TABLET ORAL at 11:56

## 2022-07-06 RX ADMIN — ROPINIROLE HYDROCHLORIDE 0.5 MG: 0.25 TABLET, FILM COATED ORAL at 21:08

## 2022-07-06 RX ADMIN — PROCHLORPERAZINE EDISYLATE 2.5 MG: 5 INJECTION INTRAMUSCULAR; INTRAVENOUS at 05:28

## 2022-07-06 RX ADMIN — HYDROCODONE BITARTRATE AND ACETAMINOPHEN 1 TABLET: 10; 325 TABLET ORAL at 08:25

## 2022-07-06 RX ADMIN — ASPIRIN 81 MG: 81 TABLET, COATED ORAL at 08:17

## 2022-07-06 RX ADMIN — INSULIN ASPART 4 UNITS: 100 INJECTION, SOLUTION INTRAVENOUS; SUBCUTANEOUS at 17:21

## 2022-07-06 RX ADMIN — INSULIN ASPART 5 UNITS: 100 INJECTION, SOLUTION INTRAVENOUS; SUBCUTANEOUS at 04:15

## 2022-07-06 RX ADMIN — Medication 1 TABLET: at 08:17

## 2022-07-06 RX ADMIN — DIPHENHYDRAMINE HYDROCHLORIDE AND LIDOCAINE HYDROCHLORIDE AND ALUMINUM HYDROXIDE AND MAGNESIUM HYDRO 10 ML: KIT at 16:20

## 2022-07-06 RX ADMIN — MAGNESIUM SULFATE HEPTAHYDRATE 1 G: 1 INJECTION, SOLUTION INTRAVENOUS at 08:18

## 2022-07-06 RX ADMIN — CETIRIZINE HYDROCHLORIDE 5 MG: 10 TABLET, FILM COATED ORAL at 08:17

## 2022-07-06 RX ADMIN — MUPIROCIN 1 APPLICATION: 20 OINTMENT TOPICAL at 17:21

## 2022-07-06 RX ADMIN — DIPHENHYDRAMINE HYDROCHLORIDE AND LIDOCAINE HYDROCHLORIDE AND ALUMINUM HYDROXIDE AND MAGNESIUM HYDRO 10 ML: KIT at 11:56

## 2022-07-06 RX ADMIN — LEVOTHYROXINE SODIUM 25 MCG: 50 TABLET ORAL at 05:28

## 2022-07-06 RX ADMIN — Medication 100 MG: at 08:17

## 2022-07-06 NOTE — SIGNIFICANT NOTE
07/06/22 1447   OTHER   Discipline physical therapist   Rehab Time/Intention   Session Not Performed patient/family declined treatment   Recommendation   PT - Next Appointment   (PT seen this PM and stated she did not feel well. Due to working on therapy this morning, treatment deferred)

## 2022-07-06 NOTE — PAYOR COMM NOTE
"[facility]  NPI: [id number]    Utilization Review   Contact:Neli Delvalle MSN, APRN, NP-C  Phone: [phone]  Fax: [phone]      Continue Stay Update  REF# [id number]      Reny Merino (62 y.o. Female)             Date of Birth   1960    Social Security Number       Address   [address] [place] [address]    Home Phone   [phone]    MRN   [id number]       Evangel[employer]   [employer]    Marital Status                               Admission Date   7/1/22    Admission Type   Emergency    Admitting Provider   Berenice Hicks DO    Attending Provider   Mark Crawford DO    Department, Room/Bed   [facility] PROGRESS CARE, P219/1P       Discharge Date       Discharge Disposition       Discharge Destination                               Attending Provider: Mark Crawford DO    Allergies: Penicillins, Codeine, Sulfa Antibiotics    Isolation: None   Infection: None   Code Status: CPR   Advance Care Planning Activity    Ht: 165.1 cm (65\")   Wt: 45.6 kg (100 lb 9.6 oz)    Admission Cmt: None   Principal Problem: None                Active Insurance as of 7/1/2022     Primary Coverage     Payor Plan Insurance Group Employer/Plan Group    [employer] [place] [employer]      Payor Plan Address Payor Plan Phone Number Payor Plan Fax Number Effective Dates    [address] [address] [phone]  4/12/2017 - None Entered    [facility] [id number]       Subscriber Name Subscriber Birth Date Member ID       RENY MERINO 1960 [id number]                 Emergency Contacts      (Rel.) Home Phone Work Phone Mobile Phone    Cliff Leiva (Significant Other) [phone] -- [phone]    Liza Oliva (Daughter) [phone] -- [phone]            Mark Crawford DO    Physician   Medicine   Progress Notes       Signed   Date of Service:  07/05/22 1150   Creation Time:  07/05/22 1150              Signed        Expand All Collapse " All          Show:Clear all  [x]Manual[x]Template[x]Copied    Added by:  [x]Mark Crawford DO      []Alejandra for details            [facility]        Subjective []Expand by Default        History:   Reny Elena is a 62 y.o. female admitted on 7/1/2022 secondary to <principal problem not specified>      Procedures:   7/2/22: Right IJ central line placement     CC: Follow up CVA     Patient seen and examined with GLORIA Grey. Awake and alert. States she feels slightly improved today. Reports nausea with no reported vomiting. Reports HA but improved. Continues to report ongoing vision changes. No reported CP or dyspnea. Reports some palpitations. BP remaining intermittently elevated.      History taken from: patient, chart, and RN.              Objective []Expand by Default        Vital Signs  Temp:  [98.3 °F (36.8 °C)-98.9 °F (37.2 °C)] 98.3 °F (36.8 °C)  Heart Rate:  [72-93] 84  Resp:  [11-25] 25  BP: (115-238)/() 238/107     Intake/Output Summary (Last 24 hours) at 7/5/2022 1150  Last data filed at 7/5/2022 1100      Gross per 24 hour   Intake 50 ml   Output 1003 ml   Net -953 ml            Physical Exam: Unchanged from previous.   General:    Awake, alert, in no acute distress, chronically ill appearing    Heart:      Normal S1 and S2. Regular rate and rhythm. No significant murmur, rubs or gallops appreciated.   Lungs:     Respirations regular, even and unlabored. Lungs clear to auscultation B/L. No wheezes, rales or rhonchi.   Abdomen:   Soft and nontender. No guarding, rebound tenderness or  organomegaly noted. Bowel sounds present x 4.   Extremities:  No clubbing, cyanosis or edema noted. Moves UE and LE equally B/L with generalized weakness.       Results Review:               Results from last 7 days   Lab Units 07/05/22  0019 07/04/22  0035 07/03/22  1445 07/03/22  0022 07/02/22  0512 07/01/22  1727   WBC 10*3/mm3 6.77 7.10 7.83 8.28 6.62 4.43   HEMOGLOBIN  g/dL 8.7* 8.8* 8.5* 9.8* 11.2* 11.8*   PLATELETS 10*3/mm3 109* 129* 137* 138* 152 131*                Results from last 7 days   Lab Units 07/05/22  0019 07/04/22  0035 07/03/22  1445 07/03/22  0022 07/02/22  0512 07/01/22  1727   SODIUM mmol/L 136 135* 134* 141 142 138   POTASSIUM mmol/L 3.7 2.9* 3.5 3.3* 3.4* 3.8   CHLORIDE mmol/L 101 99 98 101 101 100   CO2 mmol/L 26.5 26.7 27.2 26.7 25.1 25.4   BUN mg/dL 40* 42* 43* 48* 42* 37*   CREATININE mg/dL 3.12* 3.31* 3.40* 3.82* 3.78* 3.38*   CALCIUM mg/dL 7.8* 7.9* 7.8* 8.1* 8.7 8.6   GLUCOSE mg/dL 194* 281* 233* 264* 219* 206*             Results from last 7 days   Lab Units 07/05/22  0019 07/03/22  1445 07/01/22  1727   BILIRUBIN mg/dL 0.2 0.3 0.5   ALK PHOS U/L 85 63 82   AST (SGOT) U/L 16 16 22   ALT (SGPT) U/L 12 11 16             Results from last 7 days   Lab Units 07/05/22  0019 07/02/22  0512 07/01/22  1931   MAGNESIUM mg/dL 1.4* 1.6 1.5*           Results from last 7 days   Lab Units 07/01/22  1727   INR   0.97             Results from last 7 days   Lab Units 07/02/22  0512 07/01/22  1931 07/01/22  1727   TROPONIN T ng/mL 0.071* 0.081* 0.085*             Imaging Results (Last 24 Hours)      ** No results found for the last 24 hours. **                Medications:  aspirin, 81 mg, Oral, Daily  atorvastatin, 80 mg, Oral, Nightly  cetirizine, 5 mg, Oral, Daily  docusate sodium, 100 mg, Oral, BID  FLUoxetine, 40 mg, Oral, Daily  folic acid, 1 mg, Oral, Daily  gabapentin, 300 mg, Oral, Daily  Insulin Aspart, 0-7 Units, Subcutaneous, TID AC  levothyroxine, 25 mcg, Oral, Q AM  multivitamin, 1 tablet, Oral, Daily  pantoprazole, 40 mg, Oral, Q AM  rOPINIRole, 0.5 mg, Oral, BID  sodium chloride, 10 mL, Intravenous, Q12H  sodium chloride, 10 mL, Intravenous, Q12H  sodium chloride, 10 mL, Intravenous, Q12H  sodium chloride, 10 mL, Intravenous, Q12H  sodium chloride, 10 mL, Intravenous, Q12H  vitamin B-1, 100 mg, Oral, Daily        norepinephrine, 0.02-0.3 mcg/kg/min, Last  Rate: Stopped (07/03/22 1801)                       Assessment & Plan      Acute to subacute ischemic strokes: Concern for possible cardioembolic etiology vs small vessel. CT head reveals atrophy and chronic microangiopathic change. MRI reveals a few small patchy foci of acute vs early subacute infarct involving the left frontal and left cerebellar hemisphere with extensive presumed chronic microvascular ischemic changes with multiple old lacunar infarcts. No significant stenosis noted on MRA. Echo reveals an EF of 51-55%, grade I diastolic dysfunction, trace AR and mild AS with negative saline test. Cont ASA and statin. PT/OT and SLP. Will need outpatient event monitor. Teleneurology has signed off with input appreciated.      Hypertensive urgency: Improved and weaned off Cardizem gtt. Received home Coreg and started on Procardia with subsequent hypotension requiring vasopressor support. Concern for severe orthostatic hypotension with supine HTN. Holding scheduled antihypertensives. Cont PRN Clonidine per nephrology recs. Cont to monitor BP closely.      Nausea and vomiting with recent weight loss: Concern for possible gastroparesis. No acute findings on CT. Neurology recommended LP with concern for possible hypercoagulability/underlying malignancy in the setting of CVA's. However, pt is respectfully declining LP and expresses understanding of outlined concerns. No obvious malignancy on imaging at present. Order gastric emptying study. May benefit from upper GI eval. Cont supportive treatment.      Recent traumatic subdural hematoma: No evidence of subdural hematoma on imaging. Cont supportive treatment with close monitoring.      Hypokalemia: K+ improved today. Mg<2 and replaced. PD per nephrology. Cont to monitor.       Ongoing vision changes: Pt reports ongoing vision changes for the last month. Imaging as above. Consult ophthalmology.      ESRD on PD: Cont PD per nephrology with input appreciated.      DM II,  non-insulin dependent with neuropathy: HgbA1c 7.6. Episode of hypoglycemia on 7/3 with no further episodes reported. Cont Accuchecks.      Chronic diastolic CHF: Echo as above. Appears compensated at present. PD per nephrology. Monitor volume status.      Chronic normocytic anemia: Possibly anemia of CKD. No obvious signs of bleeding. Cont to monitor.      Hyperlipidemia: Cont statin.      Hypothyroidism: TSH is elevated with normal free T4. Cont levothyroxine.      PAD: Cont ASA and statin.      DVT PPX: SCD's     Pt is at high risk 2/2 acute CVA, hypertensive urgency, DM with hypoglycemia, electrolyte abnormalities, recent subdural hematoma, CHF, ESRD on PD, unintentional weight loss.      Disposition: May require short term rehab placement.         Mark Crawford DO  07/05/22  11:50 EDT                        Leanna Connolly MSW       Case Management   Case Management/Social Work       Signed   Date of Service:  07/06/22 0933   Creation Time:  07/06/22 0933              Signed              Show:Clear all  []Manual[x]Template[]Copied    Added by:  [x]Leanna Connolly MSW      []Alejandra for details    Discharge Planning Assessment   Jose Alfredo     Patient Name: Reny Elena                   MRN: [id number]  Today's Date: 7/6/2022                       Admit Date: 7/1/2022             Discharge Plan            Row Name 07/06/22 0932             Plan      Plan SS noted that inpatient rehab has submitted pt to insurance for pre auth, no updates on determination at this time. SS following.                  DELROY Son

## 2022-07-06 NOTE — PROGRESS NOTES
[facility] HOSPITALIST PROGRESS NOTE    Subjective     History:   Reny Elena is a 62 y.o. female admitted on 7/1/2022 secondary to <principal problem not specified>     Procedures:   7/2/22: Right IJ central line placement    CC: Follow up CVA    Patient seen and examined with GLORIA Flores. Awake and alert. States she does not feel well today. Reports generalized weakness. Reports nausea but no reported vomiting. Reports episode of palpitations. No reported CP. States she thinks dose of Reglan helped yesterday. BP has continued to fluctuate with elevated BP requiring PRN Clonidine with subsequent severe hypotension requiring initiation of vasopressor support. BP improved and weaned off vasopressor support this AM.     History taken from: patient, chart, and RN.      Objective     Vital Signs  Temp:  [96.5 °F (35.8 °C)-98.9 °F (37.2 °C)] 96.5 °F (35.8 °C)  Heart Rate:  [63-92] 73  Resp:  [12-22] 18  BP: ()/() 176/81    Intake/Output Summary (Last 24 hours) at 7/6/2022 1240  Last data filed at 7/6/2022 1200  Gross per 24 hour   Intake --   Output 535 ml   Net -535 ml         Physical Exam: Unchanged from previous.   General:    Awake, alert, in no acute distress, chronically ill appearing    Heart:      Normal S1 and S2. Regular rate and rhythm. No significant murmur, rubs or gallops appreciated.   Lungs:     Respirations regular, even and unlabored. Lungs clear to auscultation B/L. No wheezes, rales or rhonchi.   Abdomen:   Soft and nontender. No guarding, rebound tenderness or  organomegaly noted. Bowel sounds present x 4.   Extremities:  No clubbing, cyanosis or edema noted. Moves UE and LE equally B/L with generalized weakness.      Results Review:    Results from last 7 days   Lab Units 07/06/22  0143 07/05/22  0019 07/04/22  0035 07/03/22  1445 07/03/22  0022 07/02/22  0512 07/01/22  1727   WBC 10*3/mm3 4.04 6.77 7.10 7.83 8.28 6.62 4.43   HEMOGLOBIN g/dL 7.4* 8.7* 8.8* 8.5* 9.8*  11.2* 11.8*   PLATELETS 10*3/mm3 88* 109* 129* 137* 138* 152 131*     Results from last 7 days   Lab Units 07/06/22  0143 07/05/22  0019 07/04/22  0035 07/03/22  1445 07/03/22  0022 07/02/22  0512 07/01/22  1727   SODIUM mmol/L 134* 136 135* 134* 141 142 138   POTASSIUM mmol/L 3.7 3.7 2.9* 3.5 3.3* 3.4* 3.8   CHLORIDE mmol/L 100 101 99 98 101 101 100   CO2 mmol/L 27.2 26.5 26.7 27.2 26.7 25.1 25.4   BUN mg/dL 41* 40* 42* 43* 48* 42* 37*   CREATININE mg/dL 2.93* 3.12* 3.31* 3.40* 3.82* 3.78* 3.38*   CALCIUM mg/dL 7.1* 7.8* 7.9* 7.8* 8.1* 8.7 8.6   GLUCOSE mg/dL 407* 194* 281* 233* 264* 219* 206*     Results from last 7 days   Lab Units 07/05/22  0019 07/03/22  1445 07/01/22  1727   BILIRUBIN mg/dL 0.2 0.3 0.5   ALK PHOS U/L 85 63 82   AST (SGOT) U/L 16 16 22   ALT (SGPT) U/L 12 11 16     Results from last 7 days   Lab Units 07/06/22  0143 07/05/22  0019 07/02/22  0512 07/01/22  1931   MAGNESIUM mg/dL 1.8 1.4* 1.6 1.5*     Results from last 7 days   Lab Units 07/01/22  1727   INR  0.97     Results from last 7 days   Lab Units 07/02/22  0512 07/01/22  1931 07/01/22  1727   TROPONIN T ng/mL 0.071* 0.081* 0.085*       Imaging Results (Last 24 Hours)     Procedure Component Value Units Date/Time    NM Gastric Emptying [[id number]] Collected: 07/05/22 1517     Updated: 07/05/22 1520    Narrative:      EXAM:    NM Gastric Emptying Scan     EXAM DATE:    7/5/2022 1:34 PM     CLINICAL HISTORY:    Nausea, concern for gastroparesis; I63.9-Cerebral infarction,  unspecified; I95.9-Hypotension, unspecified     TECHNIQUE:    After a period of fasting the patient was fed a meal containing Tc99m  sulfur colloid.  Images of the abdomen were obtained over a period of 1  hour.  Half time of gastric emptying and percent retention of  radionuclide activity were calculated.     COMPARISON:    No relevant prior studies available.     FINDINGS:  At 31 minutes, 77% emptying.     At 59 minutes, 85% emptying.     Half-emptying time: 20  minutes.       Impression:        Normal gastric emptying study.     This report was finalized on 7/5/2022 3:18 PM by Dr. Helder Kennedy MD.               Medications:  aspirin, 81 mg, Oral, Daily  atorvastatin, 80 mg, Oral, Nightly  cetirizine, 5 mg, Oral, Daily  docosanol, 1 application, Topical, 5x Daily  docusate sodium, 100 mg, Oral, BID  First Mouthwash (Magic Mouthwash), 10 mL, Swish & Spit, Q6H  FLUoxetine, 40 mg, Oral, Daily  folic acid, 1 mg, Oral, Daily  gabapentin, 300 mg, Oral, Daily  Insulin Aspart, 0-7 Units, Subcutaneous, TID AC  levothyroxine, 25 mcg, Oral, Q AM  metoclopramide, 5 mg, Oral, TID AC  multivitamin, 1 tablet, Oral, Daily  pantoprazole, 40 mg, Oral, Q AM  rOPINIRole, 0.5 mg, Oral, BID  sodium chloride, 10 mL, Intravenous, Q12H  sodium chloride, 10 mL, Intravenous, Q12H  sodium chloride, 10 mL, Intravenous, Q12H  sodium chloride, 10 mL, Intravenous, Q12H  sodium chloride, 10 mL, Intravenous, Q12H  vitamin B-1, 100 mg, Oral, Daily      norepinephrine, 0.02-0.3 mcg/kg/min, Last Rate: Stopped (07/06/22 0746)            Assessment & Plan    Acute to subacute ischemic strokes: Concern for possible cardioembolic etiology vs small vessel. CT head reveals atrophy and chronic microangiopathic change. MRI reveals a few small patchy foci of acute vs early subacute infarct involving the left frontal and left cerebellar hemisphere with extensive presumed chronic microvascular ischemic changes with multiple old lacunar infarcts. No significant stenosis noted on MRA. Echo reveals an EF of 51-55%, grade I diastolic dysfunction, trace AR and mild AS with negative saline test. Cont ASA and statin. PT/OT and SLP. Will need outpatient event monitor. Teleneurology has signed off with input appreciated.     Hypertensive urgency: Improved and weaned off Cardizem gtt. Received home Coreg and started on Procardia with subsequent hypotension requiring vasopressor support. Concern for severe orthostatic hypotension  with supine HTN. Holding scheduled antihypertensives. Another episode of hypotension noted following dose of PRN Clonidine requiring vasopressor support briefly. Cont PRN Clonidine per nephrology recs. Cont to monitor BP closely.     Nausea and vomiting with recent weight loss: Concern for possible gastroparesis. No acute findings on CT. Neurology recommended LP with concern for possible hypercoagulability/underlying malignancy in the setting of CVA's. However, pt is respectfully declining LP and expresses understanding of outlined concerns. No obvious malignancy on imaging at present. Obtained gastric emptying study which is normal. May benefit from upper GI eval. Start a trial of Reglan. Cont supportive treatment.     Recent traumatic subdural hematoma: No evidence of subdural hematoma on imaging. Cont supportive treatment with close monitoring.     Hypokalemia: K+ improved and stable today. Mg<2 and replaced. PD per nephrology. Cont to monitor.      Ongoing vision changes: Pt reports ongoing vision changes for the last month. Imaging as above. Ophthalmology consulted.     ESRD on PD: Cont PD per nephrology with input appreciated.     DM II, non-insulin dependent with neuropathy: HgbA1c 7.6. Episode of hypoglycemia on 7/3 with no further episodes reported. Hyperglycemic overnight but improved with borderline hypoglycemia later this AM. Cont Accuchecks.     Chronic diastolic CHF: Echo as above. Appears compensated at present. PD per nephrology. Monitor volume status.     Chronic normocytic anemia: Possibly anemia of CKD. No obvious signs of bleeding. Cont to monitor.     Hyperlipidemia: Cont statin.     Hypothyroidism: TSH is elevated with normal free T4. Cont levothyroxine.     PAD: Cont ASA and statin.     DVT PPX: SCD's    Pt is at high risk 2/2 acute CVA, hypertensive urgency, fluctuating BP, DM with hypoglycemia, electrolyte abnormalities, recent subdural hematoma, CHF, ESRD on PD, unintentional weight loss.      Disposition: Inpatient rehab determination pending.        Mark Crawford DO  07/06/22  12:40 EDT

## 2022-07-06 NOTE — PROGRESS NOTES
Malnutrition Severity Assessment      Patient meets criteria for : Severe Malnutrition  Malnutrition Type (last 8 hours)     Malnutrition Severity Assessment     Row Name 07/06/22 1609       Malnutrition Severity Assessment    Malnutrition Type Chronic Disease - Related Malnutrition    Row Name 07/06/22 1609       Insufficient Energy Intake     Insufficient Energy Intake Findings Severe    Insufficient Energy Intake  <50% of est. energy requirement for >or equal to 1 month    Row Name 07/06/22 1609       Muscle Loss    Loss of Muscle Mass Findings Severe    [place] Region Moderate - slight depression    Clavicle Bone Region Moderate - some protrusion in females, visible in males    Acromion Bone Region Moderate - acromion may slightly protrude    Scapular Bone Region Moderate - mild depression, bones may show slightly    Dorsal Hand Region Moderate - slight depression    Patellar Region Severe - prominent bone, square looking, very little muscle definition    Anterior Thigh Region Severe - line/depression along thigh, obviously thin    Posterior Calf Region Severe - thin with very little definition/firmness    Row Name 07/06/22 1609       Fat Loss    Subcutaneous Fat Loss Findings Severe    Orbital Region  Severe - pronounced hollowness/depression, dark circles, loose saggy skin    Upper Arm Region Severe - mostly skin, very little space between folds, fingers touch    Row Name 07/06/22 1609       Declining Functional Status    Declining Functional Status Findings Measurably Reduced    Row Name 07/06/22 1609       Criteria Met (Must meet criteria for severity in at least 2 of these categories: M Wasting, Fat Loss, Fluid, Secondary Signs, Wt. Status, Intake)    Patient meets criteria for  Severe Malnutrition

## 2022-07-06 NOTE — THERAPY TREATMENT NOTE
Acute Care - Physical Therapy Treatment Note  ED[facility] Veliz     Patient Name: Reny Elena  : 1960  MRN: [id number]  Today's Date: 2022      Visit Dx:     ICD-10-CM ICD-9-CM   1. Cerebrovascular accident (CVA), unspecified mechanism ([facility])  I63.9 434.91   2. Hypotension, unspecified hypotension type  I95.9 458.9     Patient Active Problem List   Diagnosis   • Tibia/fibula fracture   • Iron deficiency anemia   • Type 2 diabetes mellitus with hyperglycemia, with long-term current use of insulin ([facility])   • Wound of right ankle   • Cellulitis   • Metabolic encephalopathy   • History of non-ST elevation myocardial infarction (NSTEMI)   • CKD (chronic kidney disease) stage 3, GFR 30-59 ml/min ([facility])   • Elevated troponin   • HTN (hypertension)   • CVA (cerebral vascular accident) ([facility])   • Chronic diastolic CHF (congestive heart failure) ([facility])   • Decubitus ulcer of coccyx, unspecified pressure ulcer stage   • Depression   • Expressive aphasia   • Impaired mobility and ADLs   • Renal failure   • Hyperkalemia   • Subdural hematoma ([facility])   • Severe malnutrition ([facility])   • Cerebrovascular accident (CVA), unspecified mechanism ([facility])     Past Medical History:   Diagnosis Date   • Arthritis    • Closed fracture of right fibula and tibia 2018   • Depression    • Diabetic ulcer of left foot associated with type 2 diabetes mellitus ([facility]) 2017   • Diastolic dysfunction    • Disease of thyroid gland    • Elevated cholesterol    • End stage renal disease ([facility])    • Essential hypertension    • GERD (gastroesophageal reflux disease)    • History of transfusion    • Hyperlipidemia    • Iron deficiency anemia 2018   • PAD (peripheral artery disease) ([facility])    • Renal failure    • Type 2 diabetes mellitus with hyperglycemia, with long-term current use of insulin ([facility]) 2018     Past Surgical History:   Procedure Laterality Date   • ABDOMINAL SURGERY     • BACK SURGERY      Post spinal diskectomy, osteophytectomy  in one lumbar interspace   • CATARACT EXTRACTION      Left    •  SECTION     • DENTAL PROCEDURE     • ENDOSCOPY     • FRACTURE SURGERY      right leg   • HYSTERECTOMY     • INCISION AND DRAINAGE LEG Left 4/15/2017    Procedure: INCISION AND DRAINAGE LOWER EXTREMITY;  Surgeon: Basilio Morris MD;  Location: [facility] OR;  Service:    • INCISION AND DRAINAGE LEG Left 2017    Procedure: Irrigation and Debridment abcess diabetic wound left foot with deep culture;  Surgeon: Basilio Morris MD;  Location: [facility] OR;  Service:    • INSERTION PERITONEAL DIALYSIS CATHETER N/A 2021    Procedure: INSERTION PERITONEAL DIALYSIS CATHETER LAPAROSCOPIC;  Surgeon: Edy Glover MD;  Location: [facility] OR;  Service: General;  Laterality: N/A;   • KNEE ARTHROSCOPY Right    • ORIF TIBIA FRACTURE Right 2018    Procedure: TIBIAL  FRACTURE OPEN REDUCTION INTERNAL FIXATION;  Surgeon: Jung Barragan MD;  Location: [facility] OR;  Service: Orthopedics   • TOE AMPUTATION Right     5th   • TUBAL ABDOMINAL LIGATION       PT Assessment (last 12 hours)     PT Evaluation and Treatment     Row Name 22 1430          Physical Therapy Time and Intention    Document Type therapy note (daily note)  -     Mode of Treatment physical therapy  -     Patient Effort adequate  -     Comment Pt worked with PT this date, working on ambulation with RW and HHA attempt. Rest breaks given.  -     Row Name 22 1430          Bed Mobility    Bed Mobility supine-sit;sit-supine  -     Supine-Sit [place] (Bed Mobility) standby assist;contact guard;minimum assist (75% patient effort)  -     Sit-Supine [place] (Bed Mobility) modified independence  -     Row Name 22 1430          Transfers    Transfers sit-stand transfer;stand-sit transfer  -     Sit-Stand [place] (Transfers) contact guard;minimum assist (75% patient effort)  -     Stand-Sit [place] (Transfers) contact guard;standby assist  -     Row  Name 07/06/22 1430          Sit-Stand Transfer    Assistive Device (Sit-Stand Transfers) walker, front-wheeled  -KH     Row Name 07/06/22 1430          Stand-Sit Transfer    Assistive Device (Stand-Sit Transfers) walker, front-wheeled  -KH     Row Name 07/06/22 1430          Gait/Stairs (Locomotion)    Gait/Stairs Locomotion gait/ambulation assistive device  -     [place] Level (Gait) contact guard  -     Assistive Device (Gait) walker, front-wheeled;other (see comments)  HHA  -     Distance in Feet (Gait) <10' total, grossly 3 trips of 3 feet near bedside plus some steps  -     Row Name 07/06/22 1430          Plan of Care Review    Outcome Evaluation Pt did well with PT this date, decreased endurance/activity tolerance. Pt may benefit from orthopedic input regarding bracing for L patellar subluxation  -     Row Name 07/06/22 1430          Positioning and Restraints    Pre-Treatment Position in bed  -     Post Treatment Position bed  -KH     In Bed supine;with family/caregiver  -           User Key  (r) = Recorded By, (t) = Taken By, (c) = Cosigned By    Initials Name Provider Type    Luzma Faye, PT Physical Therapist                Physical Therapy Education                 Title: PT OT SLP Therapies (Done)     Topic: Physical Therapy (Done)     Point: Mobility training (Done)     Learning Progress Summary           Patient Acceptance, TB,E, VU by BR at 7/5/2022 1621      Show all documentation for this point (1)                 Point: Home exercise program (Done)     Learning Progress Summary           Patient Acceptance, TB,E, VU by BR at 7/5/2022 1621      Show all documentation for this point (1)                 Point: Body mechanics (Done)     Learning Progress Summary           Patient Acceptance, TB,E, VU by BR at 7/5/2022 1621      Show all documentation for this point (1)                 Point: Precautions (Done)     Learning Progress Summary           Patient Acceptance, TB,E,  VU by LICO at 7/5/2022 1621      Show all documentation for this point (1)                             User Key     Initials Effective Dates Name Provider Type Discipline    BR 05/10/22 -  Sierra Nazario RNA Registered Nurse Nurse              PT Recommendation and Plan     Outcome Evaluation: Pt did well with PT this date, decreased endurance/activity tolerance. Pt may benefit from orthopedic input regarding bracing for L patellar subluxation   Outcome Measures     Row Name 07/05/22 1122 07/05/22 1100          Modified [place] Scale    Pre-Stroke Modified [place] Scale 3 - Moderate disability.  Requiring some help, but able to walk without assistance.  -KR --     Modified [place] Scale 4 - Moderately severe disability.  Unable to walk without assistance, and unable to attend to own bodily needs without assistance.  -KR --            Functional Assessment    Outcome Measure Options -- Modified [place]  -KR           User Key  (r) = Recorded By, (t) = Taken By, (c) = Cosigned By    Initials Name Provider Type    Helder Panda, OT Occupational Therapist                 Time Calculation:    PT Charges     Row Name 07/06/22 1434             Time Calculation    PT Received On 07/06/22  -              Time Calculation- PT    Total Timed Code Minutes- PT 15 minute(s)  -            User Key  (r) = Recorded By, (t) = Taken By, (c) = Cosigned By    Initials Name Provider Type    Luzma Faye, PT Physical Therapist              Therapy Charges for Today     Code Description Service Date Service Provider Modifiers Qty    [id number]  PT THERAPEUTIC ACT EA 15 MIN 7/6/2022 Luzma Lanza, PT GP 1          PT G-Codes  Outcome Measure Options: Modified Winder  Modified Ed[place] Scale: 4 - Moderately severe disability.  Unable to walk without assistance, and unable to attend to own bodily needs without assistance.    Luzma Lanza PT  7/6/2022

## 2022-07-06 NOTE — PLAN OF CARE
Goal Outcome Evaluation:   Pt alert and oriented. PT's b/p has been elevated systolic in the 200's during this shift. PRN b/p meds given per order. Pt's b/p then dropped too low. Levophed was started per order. Levo is of and pt's b/p is stable at this time. MD aware. Bed is locked in low position with call light in reach.

## 2022-07-06 NOTE — PLAN OF CARE
Goal Outcome Evaluation:           Progress: no change  Outcome Evaluation: VSS. RA. Patients BP fluctuated most of shift, however did not require levophed or prn BP meds. BP seems to fluctuate most with position changes. PRN pain and n/v meds given. Mag replaced. Patients appetite still poor. Patient resting at this time, call light in reach.

## 2022-07-06 NOTE — CASE MANAGEMENT/SOCIAL WORK
Discharge Planning Assessment  [facility]iz     Patient Name: Reny Elena  MRN: [id number]  Today's Date: 7/6/2022    Admit Date: 7/1/2022     Discharge Plan     Row Name 07/06/22 0932       Plan    Plan SS noted that inpatient rehab has submitted pt to insurance for pre auth, no updates on determination at this time. SS following.              DELROY Son

## 2022-07-07 LAB
ALBUMIN SERPL-MCNC: 1.91 G/DL (ref 3.5–5.2)
ALBUMIN/GLOB SERPL: 0.9 G/DL
ALP SERPL-CCNC: 190 U/L (ref 39–117)
ALT SERPL W P-5'-P-CCNC: 13 U/L (ref 1–33)
ANION GAP SERPL CALCULATED.3IONS-SCNC: 7.1 MMOL/L (ref 5–15)
AST SERPL-CCNC: 18 U/L (ref 1–32)
BACTERIA SPEC AEROBE CULT: NORMAL
BACTERIA SPEC AEROBE CULT: NORMAL
BASOPHILS # BLD AUTO: 0.03 10*3/MM3 (ref 0–0.2)
BASOPHILS NFR BLD AUTO: 0.6 % (ref 0–1.5)
BILIRUB SERPL-MCNC: <0.2 MG/DL (ref 0–1.2)
BUN SERPL-MCNC: 48 MG/DL (ref 8–23)
BUN/CREAT SERPL: 17 (ref 7–25)
CALCIUM SPEC-SCNC: 7.2 MG/DL (ref 8.6–10.5)
CHLORIDE SERPL-SCNC: 98 MMOL/L (ref 98–107)
CO2 SERPL-SCNC: 26.9 MMOL/L (ref 22–29)
CREAT SERPL-MCNC: 2.83 MG/DL (ref 0.57–1)
DEPRECATED RDW RBC AUTO: 43.2 FL (ref 37–54)
EGFRCR SERPLBLD CKD-EPI 2021: 18.1 ML/MIN/1.73
EOSINOPHIL # BLD AUTO: 0.21 10*3/MM3 (ref 0–0.4)
EOSINOPHIL NFR BLD AUTO: 4 % (ref 0.3–6.2)
ERYTHROCYTE [DISTWIDTH] IN BLOOD BY AUTOMATED COUNT: 13.4 % (ref 12.3–15.4)
FLUAV RNA RESP QL NAA+PROBE: NOT DETECTED
FLUBV RNA RESP QL NAA+PROBE: NOT DETECTED
GLOBULIN UR ELPH-MCNC: 2.2 GM/DL
GLUCOSE BLDC GLUCOMTR-MCNC: 135 MG/DL (ref 70–130)
GLUCOSE BLDC GLUCOMTR-MCNC: 261 MG/DL (ref 70–130)
GLUCOSE BLDC GLUCOMTR-MCNC: 284 MG/DL (ref 70–130)
GLUCOSE SERPL-MCNC: 425 MG/DL (ref 65–99)
HCT VFR BLD AUTO: 24.9 % (ref 34–46.6)
HGB BLD-MCNC: 8.1 G/DL (ref 12–15.9)
IMM GRANULOCYTES # BLD AUTO: 0.02 10*3/MM3 (ref 0–0.05)
IMM GRANULOCYTES NFR BLD AUTO: 0.4 % (ref 0–0.5)
LYMPHOCYTES # BLD AUTO: 1.76 10*3/MM3 (ref 0.7–3.1)
LYMPHOCYTES NFR BLD AUTO: 33.4 % (ref 19.6–45.3)
MAGNESIUM SERPL-MCNC: 2.1 MG/DL (ref 1.6–2.4)
MCH RBC QN AUTO: 28.6 PG (ref 26.6–33)
MCHC RBC AUTO-ENTMCNC: 32.5 G/DL (ref 31.5–35.7)
MCV RBC AUTO: 88 FL (ref 79–97)
MONOCYTES # BLD AUTO: 0.37 10*3/MM3 (ref 0.1–0.9)
MONOCYTES NFR BLD AUTO: 7 % (ref 5–12)
NEUTROPHILS NFR BLD AUTO: 2.88 10*3/MM3 (ref 1.7–7)
NEUTROPHILS NFR BLD AUTO: 54.6 % (ref 42.7–76)
NRBC BLD AUTO-RTO: 0 /100 WBC (ref 0–0.2)
PLATELET # BLD AUTO: 99 10*3/MM3 (ref 140–450)
PMV BLD AUTO: 10.6 FL (ref 6–12)
POTASSIUM SERPL-SCNC: 3.8 MMOL/L (ref 3.5–5.2)
PROT SERPL-MCNC: 4.1 G/DL (ref 6–8.5)
QT INTERVAL: 424 MS
QTC INTERVAL: 513 MS
RBC # BLD AUTO: 2.83 10*6/MM3 (ref 3.77–5.28)
SARS-COV-2 RNA RESP QL NAA+PROBE: NOT DETECTED
SODIUM SERPL-SCNC: 132 MMOL/L (ref 136–145)
WBC NRBC COR # BLD: 5.27 10*3/MM3 (ref 3.4–10.8)

## 2022-07-07 PROCEDURE — 83735 ASSAY OF MAGNESIUM: CPT | Performed by: INTERNAL MEDICINE

## 2022-07-07 PROCEDURE — 97110 THERAPEUTIC EXERCISES: CPT

## 2022-07-07 PROCEDURE — 85025 COMPLETE CBC W/AUTO DIFF WBC: CPT | Performed by: STUDENT IN AN ORGANIZED HEALTH CARE EDUCATION/TRAINING PROGRAM

## 2022-07-07 PROCEDURE — 87636 SARSCOV2 & INF A&B AMP PRB: CPT | Performed by: INTERNAL MEDICINE

## 2022-07-07 PROCEDURE — 25010000002 PROCHLORPERAZINE 10 MG/2ML SOLUTION: Performed by: INTERNAL MEDICINE

## 2022-07-07 PROCEDURE — 63710000001 INSULIN ASPART PER 5 UNITS: Performed by: INTERNAL MEDICINE

## 2022-07-07 PROCEDURE — 93005 ELECTROCARDIOGRAM TRACING: CPT | Performed by: INTERNAL MEDICINE

## 2022-07-07 PROCEDURE — 82962 GLUCOSE BLOOD TEST: CPT

## 2022-07-07 PROCEDURE — 63710000001 INSULIN ASPART PER 5 UNITS: Performed by: HOSPITALIST

## 2022-07-07 PROCEDURE — 80053 COMPREHEN METABOLIC PANEL: CPT | Performed by: INTERNAL MEDICINE

## 2022-07-07 PROCEDURE — 99232 SBSQ HOSP IP/OBS MODERATE 35: CPT | Performed by: INTERNAL MEDICINE

## 2022-07-07 PROCEDURE — 97530 THERAPEUTIC ACTIVITIES: CPT

## 2022-07-07 RX ORDER — BISACODYL 10 MG
10 SUPPOSITORY, RECTAL RECTAL DAILY
Status: DISCONTINUED | OUTPATIENT
Start: 2022-07-07 | End: 2022-07-10 | Stop reason: HOSPADM

## 2022-07-07 RX ORDER — LACTULOSE 10 G/15ML
30 SOLUTION ORAL ONCE
Status: COMPLETED | OUTPATIENT
Start: 2022-07-07 | End: 2022-07-07

## 2022-07-07 RX ORDER — INSULIN ASPART 100 [IU]/ML
5 INJECTION, SOLUTION INTRAVENOUS; SUBCUTANEOUS ONCE
Status: COMPLETED | OUTPATIENT
Start: 2022-07-07 | End: 2022-07-07

## 2022-07-07 RX ADMIN — Medication 1 TABLET: at 08:49

## 2022-07-07 RX ADMIN — Medication 10 ML: at 21:36

## 2022-07-07 RX ADMIN — CETIRIZINE HYDROCHLORIDE 5 MG: 10 TABLET, FILM COATED ORAL at 08:49

## 2022-07-07 RX ADMIN — Medication 10 ML: at 08:50

## 2022-07-07 RX ADMIN — ATORVASTATIN CALCIUM 80 MG: 40 TABLET, FILM COATED ORAL at 21:35

## 2022-07-07 RX ADMIN — METOCLOPRAMIDE 5 MG: 10 TABLET ORAL at 08:49

## 2022-07-07 RX ADMIN — LEVOTHYROXINE SODIUM 25 MCG: 50 TABLET ORAL at 06:09

## 2022-07-07 RX ADMIN — Medication 10 ML: at 21:35

## 2022-07-07 RX ADMIN — INSULIN ASPART 5 UNITS: 100 INJECTION, SOLUTION INTRAVENOUS; SUBCUTANEOUS at 02:55

## 2022-07-07 RX ADMIN — ROPINIROLE HYDROCHLORIDE 0.5 MG: 0.25 TABLET, FILM COATED ORAL at 21:35

## 2022-07-07 RX ADMIN — FLUOXETINE HYDROCHLORIDE 40 MG: 20 CAPSULE ORAL at 08:49

## 2022-07-07 RX ADMIN — METOCLOPRAMIDE 5 MG: 10 TABLET ORAL at 17:04

## 2022-07-07 RX ADMIN — METOCLOPRAMIDE 5 MG: 10 TABLET ORAL at 11:31

## 2022-07-07 RX ADMIN — PANTOPRAZOLE SODIUM 40 MG: 40 TABLET, DELAYED RELEASE ORAL at 06:09

## 2022-07-07 RX ADMIN — FOLIC ACID 1 MG: 1 TABLET ORAL at 08:49

## 2022-07-07 RX ADMIN — HYDROCODONE BITARTRATE AND ACETAMINOPHEN 1 TABLET: 10; 325 TABLET ORAL at 17:11

## 2022-07-07 RX ADMIN — HYDROXYZINE HYDROCHLORIDE 25 MG: 25 TABLET ORAL at 19:38

## 2022-07-07 RX ADMIN — INSULIN ASPART 4 UNITS: 100 INJECTION, SOLUTION INTRAVENOUS; SUBCUTANEOUS at 17:04

## 2022-07-07 RX ADMIN — Medication 10 ML: at 08:51

## 2022-07-07 RX ADMIN — INSULIN ASPART 4 UNITS: 100 INJECTION, SOLUTION INTRAVENOUS; SUBCUTANEOUS at 11:31

## 2022-07-07 RX ADMIN — Medication 100 MG: at 08:49

## 2022-07-07 RX ADMIN — LACTULOSE 30 G: 10 SOLUTION ORAL at 17:04

## 2022-07-07 RX ADMIN — PROCHLORPERAZINE EDISYLATE 2.5 MG: 5 INJECTION INTRAMUSCULAR; INTRAVENOUS at 06:46

## 2022-07-07 RX ADMIN — ASPIRIN 81 MG: 81 TABLET, COATED ORAL at 08:49

## 2022-07-07 RX ADMIN — HYDROCODONE BITARTRATE AND ACETAMINOPHEN 1 TABLET: 10; 325 TABLET ORAL at 01:09

## 2022-07-07 RX ADMIN — ROPINIROLE HYDROCHLORIDE 0.5 MG: 0.25 TABLET, FILM COATED ORAL at 08:49

## 2022-07-07 RX ADMIN — BISACODYL 10 MG: 10 SUPPOSITORY RECTAL at 14:17

## 2022-07-07 NOTE — CASE MANAGEMENT/SOCIAL WORK
Discharge Planning Assessment  [facility]     Patient Name: Reny Elena  MRN: [id number]  Today's Date: 7/7/2022    Admit Date: 7/1/2022     Discharge Plan     Row Name 07/07/22 1436       Plan    Plan SS notified by inpatient rehab admissions that they will be able to admit pt tomorrow, 7/8/22, with negative covid test. MD updated. Pt is agreeable. SS following.              Continued Care and Services - Admitted Since 7/1/2022     Destination Coordination complete.    Service Provider Request Status Selected Services Address Phone Fax Patient Preferred    [facility]   Selected Inpatient Rehabilitation [address] [facility] [id number] [phone] --              DELROY Son

## 2022-07-07 NOTE — PROGRESS NOTES
[facility] HOSPITALIST PROGRESS NOTE    Subjective     History:   Reny Elena is a 62 y.o. female admitted on 7/1/2022 secondary to <principal problem not specified>     Procedures:   7/2/22: Right IJ central line placement    CC: Follow up CVA    Patient seen and examined with GLORIA Farah. Awake and alert. Continues to report generalized weakness. Reports nausea but no reported vomiting. Reports constipation. No reported CP, dyspnea or palpitations. BP overall stable. No acute events overnight per RN.     History taken from: patient, chart, and RN.      Objective     Vital Signs  Temp:  [98.3 °F (36.8 °C)-99.4 °F (37.4 °C)] 98.4 °F (36.9 °C)  Heart Rate:  [75-95] 95  Resp:  [12-20] 20  BP: ()/() 110/71    Intake/Output Summary (Last 24 hours) at 7/7/2022 1306  Last data filed at 7/7/2022 1200  Gross per 24 hour   Intake 540 ml   Output 1356 ml   Net -816 ml         Physical Exam: Unchanged from previous.   General:    Awake, alert, in no acute distress, chronically ill appearing    Heart:      Normal S1 and S2. Regular rate and rhythm. No significant murmur, rubs or gallops appreciated.   Lungs:     Respirations regular, even and unlabored. Lungs clear to auscultation B/L. No wheezes, rales or rhonchi.   Abdomen:   Soft and nontender. No guarding, rebound tenderness or  organomegaly noted. Bowel sounds present x 4.   Extremities:  No clubbing, cyanosis or edema noted. Moves UE and LE equally B/L with generalized weakness.      Results Review:    Results from last 7 days   Lab Units 07/07/22  0104 07/06/22  0143 07/05/22  0019 07/04/22  0035 07/03/22  1445 07/03/22  0022 07/02/22  0512   WBC 10*3/mm3 5.27 4.04 6.77 7.10 7.83 8.28 6.62   HEMOGLOBIN g/dL 8.1* 7.4* 8.7* 8.8* 8.5* 9.8* 11.2*   PLATELETS 10*3/mm3 99* 88* 109* 129* 137* 138* 152     Results from last 7 days   Lab Units 07/07/22  0104 07/06/22  0143 07/05/22  0019 07/04/22  0035 07/03/22  1445 07/03/22  0022 07/02/22  0512    SODIUM mmol/L 132* 134* 136 135* 134* 141 142   POTASSIUM mmol/L 3.8 3.7 3.7 2.9* 3.5 3.3* 3.4*   CHLORIDE mmol/L 98 100 101 99 98 101 101   CO2 mmol/L 26.9 27.2 26.5 26.7 27.2 26.7 25.1   BUN mg/dL 48* 41* 40* 42* 43* 48* 42*   CREATININE mg/dL 2.83* 2.93* 3.12* 3.31* 3.40* 3.82* 3.78*   CALCIUM mg/dL 7.2* 7.1* 7.8* 7.9* 7.8* 8.1* 8.7   GLUCOSE mg/dL 425* 407* 194* 281* 233* 264* 219*     Results from last 7 days   Lab Units 07/07/22  0104 07/05/22  0019 07/03/22  1445 07/01/22  1727   BILIRUBIN mg/dL <0.2 0.2 0.3 0.5   ALK PHOS U/L 190* 85 63 82   AST (SGOT) U/L 18 16 16 22   ALT (SGPT) U/L 13 12 11 16     Results from last 7 days   Lab Units 07/07/22  0104 07/06/22  0143 07/05/22  0019 07/02/22  0512 07/01/22  1931   MAGNESIUM mg/dL 2.1 1.8 1.4* 1.6 1.5*     Results from last 7 days   Lab Units 07/01/22  1727   INR  0.97     Results from last 7 days   Lab Units 07/02/22  0512 07/01/22  1931 07/01/22  1727   TROPONIN T ng/mL 0.071* 0.081* 0.085*       Imaging Results (Last 24 Hours)     ** No results found for the last 24 hours. **            Medications:  aspirin, 81 mg, Oral, Daily  atorvastatin, 80 mg, Oral, Nightly  cetirizine, 5 mg, Oral, Daily  docosanol, 1 application, Topical, 5x Daily  docusate sodium, 100 mg, Oral, BID  First Mouthwash (Magic Mouthwash), 10 mL, Swish & Spit, Q6H  FLUoxetine, 40 mg, Oral, Daily  folic acid, 1 mg, Oral, Daily  gabapentin, 300 mg, Oral, Daily  Insulin Aspart, 0-7 Units, Subcutaneous, TID AC  levothyroxine, 25 mcg, Oral, Q AM  metoclopramide, 5 mg, Oral, TID AC  multivitamin, 1 tablet, Oral, Daily  mupirocin, 1 application, Topical, Q24H  pantoprazole, 40 mg, Oral, Q AM  rOPINIRole, 0.5 mg, Oral, BID  sodium chloride, 10 mL, Intravenous, Q12H  sodium chloride, 10 mL, Intravenous, Q12H  sodium chloride, 10 mL, Intravenous, Q12H  sodium chloride, 10 mL, Intravenous, Q12H  sodium chloride, 10 mL, Intravenous, Q12H  vitamin B-1, 100 mg, Oral, Daily      norepinephrine,  0.02-0.3 mcg/kg/min, Last Rate: Stopped (07/06/22 7518)            Assessment & Plan    Acute to subacute ischemic strokes: Concern for possible cardioembolic etiology vs small vessel. CT head reveals atrophy and chronic microangiopathic change. MRI reveals a few small patchy foci of acute vs early subacute infarct involving the left frontal and left cerebellar hemisphere with extensive presumed chronic microvascular ischemic changes with multiple old lacunar infarcts. No significant stenosis noted on MRA. Echo reveals an EF of 51-55%, grade I diastolic dysfunction, trace AR and mild AS with negative saline test. Cont ASA and statin. PT/OT and SLP. Will need outpatient event monitor. Teleneurology has signed off with input appreciated.     Hypertensive urgency: Improved and weaned off Cardizem gtt. Received home Coreg and started on Procardia with subsequent hypotension requiring vasopressor support. Concern for severe orthostatic hypotension with supine HTN. Holding scheduled antihypertensives. Another episode of hypotension noted following dose of PRN Clonidine requiring vasopressor support briefly. BP overall stable today. Cont PRN Clonidine per nephrology recs. Cont to monitor BP closely.     Nausea and vomiting with recent weight loss: Concern for possible gastroparesis. No acute findings on CT. Neurology recommended LP with concern for possible hypercoagulability/underlying malignancy in the setting of CVA's. However, pt is respectfully declining LP and expresses understanding of outlined concerns. No obvious malignancy on imaging at present. Obtained gastric emptying study which is normal. Order SBFT. Cont a trial of Reglan. Cont supportive treatment.     Recent traumatic subdural hematoma: No evidence of subdural hematoma on imaging. Cont supportive treatment with close monitoring.     Hypokalemia: K+ improved and stable today. Mg improved today. PD per nephrology. Cont to monitor.      Ongoing vision  changes: Pt reports ongoing vision changes for the last month. Imaging as above. Ophthalmology consulted.     ESRD on PD: Cont PD per nephrology with input appreciated.     DM II, non-insulin dependent with neuropathy: HgbA1c 7.6. Episode of hypoglycemia on 7/3 with no further episodes reported. Intermittently hyperglycemic but overall stable. Cont Accuchecks.     Chronic diastolic CHF: Echo as above. Appears compensated at present. PD per nephrology. Monitor volume status.     Chronic normocytic anemia: Possibly anemia of CKD. No obvious signs of bleeding. Cont to monitor.     Hyperlipidemia: Cont statin.     Hypothyroidism: TSH is elevated with normal free T4. Cont levothyroxine.     PAD: Cont ASA and statin.     DVT PPX: SCD's    Pt is at high risk 2/2 acute CVA, hypertensive urgency, fluctuating BP, DM with hypoglycemia, electrolyte abnormalities, recent subdural hematoma, CHF, ESRD on PD, unintentional weight loss.     Disposition: Inpatient rehab determination pending.        Mark Crawford DO  07/07/22  13:06 EDT

## 2022-07-07 NOTE — THERAPY TREATMENT NOTE
Acute Care - Physical Therapy Treatment Note  EDWARD Veliz     Patient Name: Reny Elena  : 1960  MRN: [id number]  Today's Date: 2022      Visit Dx:     ICD-10-CM ICD-9-CM   1. Cerebrovascular accident (CVA), unspecified mechanism ([facility])  I63.9 434.91   2. Hypotension, unspecified hypotension type  I95.9 458.9     Patient Active Problem List   Diagnosis   • Tibia/fibula fracture   • Iron deficiency anemia   • Type 2 diabetes mellitus with hyperglycemia, with long-term current use of insulin ([facility])   • Wound of right ankle   • Cellulitis   • Metabolic encephalopathy   • History of non-ST elevation myocardial infarction (NSTEMI)   • CKD (chronic kidney disease) stage 3, GFR 30-59 ml/min ([facility])   • Elevated troponin   • HTN (hypertension)   • CVA (cerebral vascular accident) ([facility])   • Chronic diastolic CHF (congestive heart failure) ([facility])   • Decubitus ulcer of coccyx, unspecified pressure ulcer stage   • Depression   • Expressive aphasia   • Impaired mobility and ADLs   • Renal failure   • Hyperkalemia   • Subdural hematoma ([facility])   • Severe malnutrition ([facility])   • Cerebrovascular accident (CVA), unspecified mechanism ([facility])     Past Medical History:   Diagnosis Date   • Arthritis    • Closed fracture of right fibula and tibia 2018   • Depression    • Diabetic ulcer of left foot associated with type 2 diabetes mellitus ([facility]) 2017   • Diastolic dysfunction    • Disease of thyroid gland    • Elevated cholesterol    • End stage renal disease ([facility])    • Essential hypertension    • GERD (gastroesophageal reflux disease)    • History of transfusion    • Hyperlipidemia    • Iron deficiency anemia 2018   • PAD (peripheral artery disease) ([facility])    • Renal failure    • Type 2 diabetes mellitus with hyperglycemia, with long-term current use of insulin ([facility]) 2018     Past Surgical History:   Procedure Laterality Date   • ABDOMINAL SURGERY     • BACK SURGERY      Post spinal diskectomy, osteophytectomy  in one lumbar interspace   • CATARACT EXTRACTION      Left    •  SECTION     • DENTAL PROCEDURE     • ENDOSCOPY     • FRACTURE SURGERY      right leg   • HYSTERECTOMY     • INCISION AND DRAINAGE LEG Left 4/15/2017    Procedure: INCISION AND DRAINAGE LOWER EXTREMITY;  Surgeon: Basilio Morris MD;  Location: [facility] OR;  Service:    • INCISION AND DRAINAGE LEG Left 2017    Procedure: Irrigation and Debridment abcess diabetic wound left foot with deep culture;  Surgeon: Basilio Morris MD;  Location: [facility] OR;  Service:    • INSERTION PERITONEAL DIALYSIS CATHETER N/A 2021    Procedure: INSERTION PERITONEAL DIALYSIS CATHETER LAPAROSCOPIC;  Surgeon: Edy Glover MD;  Location: [facility] OR;  Service: General;  Laterality: N/A;   • KNEE ARTHROSCOPY Right    • ORIF TIBIA FRACTURE Right 2018    Procedure: TIBIAL  FRACTURE OPEN REDUCTION INTERNAL FIXATION;  Surgeon: Jung Barragan MD;  Location: [facility] OR;  Service: Orthopedics   • TOE AMPUTATION Right     5th   • TUBAL ABDOMINAL LIGATION       PT Assessment (last 12 hours)     PT Evaluation and Treatment     Row Name 22 1137          Physical Therapy Time and Intention    Document Type therapy note (daily note)  -     Mode of Treatment physical therapy  -     Patient Effort adequate  -     Comment Pt worked with PT again this date working on endurance/mobility, LE TE. Pt educated on rehab times, HH intensity not meeting pt needs potentially, standing endurance. During treatment, BP checked to be 114/68 with standing, 2nd check 96/59, third check was 110/71 after some time to sit and recuperate  -     Row Name 22 1137          Bed Mobility    Bed Mobility supine-sit;sit-supine  -     Supine-Sit Towner (Bed Mobility) modified independence;independent  -     Sit-Supine Towner (Bed Mobility) modified independence  -     Row Name 22 1137          Transfers    Transfers sit-stand transfer;stand-sit transfer   -     Sit-Stand [place] (Transfers) contact guard;minimum assist (75% patient effort)  -     Stand-Sit [place] (Transfers) contact guard;standby assist  -     Row Name 07/07/22 1137          Sit-Stand Transfer    Assistive Device (Sit-Stand Transfers) walker, front-wheeled  -     Row Name 07/07/22 1137          Stand-Sit Transfer    Assistive Device (Stand-Sit Transfers) walker, front-wheeled  -     Row Name 07/07/22 1137          Gait/Stairs (Locomotion)    Gait/Stairs Locomotion gait/ambulation assistive device  -     [place]ier Level (Gait) contact guard  -     Assistive Device (Gait) walker, front-wheeled  -     Distance in Feet (Gait) Grossly 10' with RW this date, 1x4', 1x6'  -     Row Name 07/07/22 1137          Balance    Comment, Balance Pt worked on static standing balance with CGA/SBA this date, encouraged to not hold to RW with R UE  -     Row Name 07/07/22 1137          Motor Skills    Therapeutic Exercise hip;knee  -     Row Name 07/07/22 1137          Hip (Therapeutic Exercise)    Hip (Therapeutic Exercise) strengthening exercise;isometric exercises  -     Hip Strengthening (Therapeutic Exercise) bilateral;flexion  -     Row Name 07/07/22 1137          Knee (Therapeutic Exercise)    Knee (Therapeutic Exercise) strengthening exercise;isometric exercises  -     Knee Isometrics (Therapeutic Exercise) right;hamstring sets  HS curls on R LE with PT resistance  -     Knee Strengthening (Therapeutic Exercise) extension;bilateral;10 repetitions;2 sets  L ER with quad ext to promote strengthening of medial fibers,  -     Row Name 07/07/22 1137          Vital Signs    Intra Systolic BP Rehab 96  -     Intra Treatment Diastolic BP 59  -     Pre Patient Position Supine  -     Intra Patient Position Sitting  -     Post Patient Position Supine  -     Row Name 07/07/22 1137          Positioning and Restraints    Pre-Treatment Position in bed  -     Post  Treatment Position bed  -     In Bed supine;call light within reach;exit alarm on  -           User Key  (r) = Recorded By, (t) = Taken By, (c) = Cosigned By    Initials Name Provider Type    Luzma Faye, MARIA C Physical Therapist                Physical Therapy Education                 Title: PT OT SLP Therapies (Done)     Topic: Physical Therapy (Done)     Point: Mobility training (Done)     Learning Progress Summary           Patient Acceptance, E,TB, VU,NR by  at 7/6/2022 1952      Show all documentation for this point (2)                 Point: Home exercise program (Done)     Learning Progress Summary           Patient Acceptance, E,TB, VU,NR by EB at 7/6/2022 1952      Show all documentation for this point (2)                 Point: Body mechanics (Done)     Learning Progress Summary           Patient Acceptance, E,TB, VU,NR by  at 7/6/2022 1952      Show all documentation for this point (2)                 Point: Precautions (Done)     Learning Progress Summary           Patient Acceptance, E,TB, VU,NR by  at 7/6/2022 1952      Show all documentation for this point (2)                             User Key     Initials Effective Dates Name Provider Type Discipline     02/24/21 -  Zhanna Cadet, RN Registered Nurse Nurse              PT Recommendation and Plan     Outcome Evaluation: Pt did well with PT this date, decreased endurance/activity tolerance. Pt may benefit from orthopedic input regarding bracing for L patellar subluxation   Outcome Measures     Row Name 07/05/22 1122 07/05/22 1100          Modified Prospect Scale    Pre-Stroke Modified Edilia Scale 3 - Moderate disability.  Requiring some help, but able to walk without assistance.  -KR --     Modified Prospect Scale 4 - Moderately severe disability.  Unable to walk without assistance, and unable to attend to own bodily needs without assistance.  -KR --            Functional Assessment    Outcome Measure Options -- Modified Edilia   -KR           User Key  (r) = Recorded By, (t) = Taken By, (c) = Cosigned By    Initials Name Provider Type    Helder Panda, OT Occupational Therapist                 Time Calculation:    PT Charges     Row Name 07/07/22 1210             Time Calculation    PT Received On 07/07/22  -              Time Calculation- PT    Total Timed Code Minutes- PT 23 minute(s)  -            User Key  (r) = Recorded By, (t) = Taken By, (c) = Cosigned By    Initials Name Provider Type    Luzma Faye, PT Physical Therapist              Therapy Charges for Today     Code Description Service Date Service Provider Modifiers Qty    63429372243  PT THERAPEUTIC ACT EA 15 MIN 7/6/2022 Luzma Lanza, PT GP 1    00583311279 HC PT THERAPEUTIC ACT EA 15 MIN 7/7/2022 Luzma Lanza, PT GP 1    [id number]  PT THER PROC EA 15 MIN 7/7/2022 Luzma Lanza, PT GP 1          PT G-Codes  Outcome Measure Options: Modified Magna  Modified Magna Scale: 4 - Moderately severe disability.  Unable to walk without assistance, and unable to attend to own bodily needs without assistance.    Luzma Lanza, MARIA C  7/7/2022

## 2022-07-07 NOTE — PLAN OF CARE
Goal Outcome Evaluation:  Plan of Care Reviewed With: patient        Progress: improving  Outcome Evaluation: PT resting in bed at this time. PT remains A/Ox4, and on RA. PT's BP has fluctuated throughout shift but has not been about 190s or below 80 systolic. PRN pain medications given. Peritoneal Dialysis ran throughout shift. PT has had no complaints of discomfort. PT requesting  or someone who can assist with making a POA/Living Will Document. VSS. Afebrile. Will Continue to Monitor PT.      Problem: Adult Inpatient Plan of Care  Goal: Plan of Care Review  7/7/2022 0326 by Zhanna Cadet RN  Outcome: Ongoing, Progressing  Flowsheets (Taken 7/7/2022 0326)  Progress: improving  Plan of Care Reviewed With: patient  Outcome Evaluation: PT resting in bed at this time. PT remains A/Ox4, and on RA. PT's BP has fluctuated throughout shift but has not been about 190s or below 80 systolic. PRN pain medications given. Peritoneal Dialysis ran throughout shift. PT has had no complaints of discomfort. PT requesting  or someone who can assist with making a POA/Living Will Document. VSS. Afebrile. Will Continue to Monitor PT.  7/7/2022 0326 by Zhanna Cadet RN  Outcome: Ongoing, Progressing  Flowsheets (Taken 7/7/2022 0326)  Progress: improving  Plan of Care Reviewed With: patient  Outcome Evaluation: PT resting in bed at this time. PT remains A/Ox4, and on RA. PT's BP has fluctuated throughout shift but has not been about 190s or below 80 systolic. PRN pain medications given. Peritoneal Dialysis ran throughout shift. PT has had no complaints of discomfort. PT requesting  or someone who can assist with making a POA/Living Will Document. VSS. Afebrile. Will Continue to Monitor PT.  Goal: Patient-Specific Goal (Individualized)  7/7/2022 0326 by Zhanna Cadet, RN  Outcome: Ongoing, Progressing  7/7/2022 0326 by Zhanna Cadet, RN  Outcome: Ongoing, Progressing  Goal: Absence of Hospital-Acquired  Illness or Injury  7/7/2022 0326 by Zhanna Cadet, RN  Outcome: Ongoing, Progressing  7/7/2022 0326 by Zhanna Cadet RN  Outcome: Ongoing, Progressing  Intervention: Identify and Manage Fall Risk  Recent Flowsheet Documentation  Taken 7/7/2022 0300 by Zhanna Cadet, RN  Safety Promotion/Fall Prevention:   activity supervised   assistive device/personal items within reach   clutter free environment maintained   fall prevention program maintained   nonskid shoes/slippers when out of bed   room organization consistent   safety round/check completed  Taken 7/7/2022 0200 by Zhanna Cadet, RN  Safety Promotion/Fall Prevention:   activity supervised   assistive device/personal items within reach   clutter free environment maintained   fall prevention program maintained   nonskid shoes/slippers when out of bed   room organization consistent   safety round/check completed  Taken 7/7/2022 0100 by Zhanna Cadet, RN  Safety Promotion/Fall Prevention:   activity supervised   assistive device/personal items within reach   clutter free environment maintained   fall prevention program maintained   nonskid shoes/slippers when out of bed   room organization consistent   safety round/check completed  Taken 7/6/2022 2300 by Zhanna Cadet, RN  Safety Promotion/Fall Prevention:   activity supervised   assistive device/personal items within reach   clutter free environment maintained   fall prevention program maintained   nonskid shoes/slippers when out of bed   room organization consistent   safety round/check completed  Taken 7/6/2022 2210 by Zhanna Cadet, RN  Safety Promotion/Fall Prevention:   activity supervised   assistive device/personal items within reach   clutter free environment maintained   fall prevention program maintained   nonskid shoes/slippers when out of bed   safety round/check completed   room organization consistent  Taken 7/6/2022 1900 by Zhanna Cadet, RN  Safety Promotion/Fall Prevention:    activity supervised   assistive device/personal items within reach   clutter free environment maintained   fall prevention program maintained   nonskid shoes/slippers when out of bed   room organization consistent   safety round/check completed  Intervention: Prevent Skin Injury  Recent Flowsheet Documentation  Taken 7/7/2022 0200 by Zhanna Cadet RN  Body Position: position changed independently  Skin Protection:   adhesive use limited   drying agents applied   incontinence pads utilized  Taken 7/6/2022 2210 by Zhanna Cadet RN  Body Position: position changed independently  Skin Protection:   adhesive use limited   drying agents applied   incontinence pads utilized  Intervention: Prevent and Manage VTE (Venous Thromboembolism) Risk  Recent Flowsheet Documentation  Taken 7/7/2022 0200 by Zhanna Cadet RN  Activity Management: activity adjusted per tolerance  VTE Prevention/Management: bleeding risk factor(s) identified  Taken 7/6/2022 2210 by Zhanna Cadet RN  Activity Management: activity adjusted per tolerance  VTE Prevention/Management: bleeding risk factor(s) identified  Range of Motion: active ROM (range of motion) encouraged  Intervention: Prevent Infection  Recent Flowsheet Documentation  Taken 7/7/2022 0300 by Zhanna Cadet RN  Infection Prevention: rest/sleep promoted  Taken 7/7/2022 0200 by Zhanna Cadet RN  Infection Prevention: rest/sleep promoted  Taken 7/7/2022 0100 by Zhanna Cadet RN  Infection Prevention: rest/sleep promoted  Taken 7/6/2022 2300 by Zhanna Cadet RN  Infection Prevention: rest/sleep promoted  Taken 7/6/2022 2210 by Zhanna Cadet RN  Infection Prevention: rest/sleep promoted  Taken 7/6/2022 1900 by Zhanna Cadet RN  Infection Prevention: rest/sleep promoted  Goal: Optimal Comfort and Wellbeing  7/7/2022 0326 by Zhanna Cadet RN  Outcome: Ongoing, Progressing  7/7/2022 0326 by Zhanna Cadet RN  Outcome: Ongoing, Progressing  Intervention: Provide  Person-Centered Care  Recent Flowsheet Documentation  Taken 7/7/2022 0200 by Zhanna Cadet RN  Trust Relationship/Rapport:   choices provided   care explained   empathic listening provided   questions answered   reassurance provided   thoughts/feelings acknowledged  Taken 7/6/2022 2210 by Zhanna Cadet RN  Trust Relationship/Rapport:   care explained   choices provided   empathic listening provided   questions answered   reassurance provided   thoughts/feelings acknowledged  Goal: Readiness for Transition of Care  7/7/2022 0326 by Zhanna Cadet RN  Outcome: Ongoing, Progressing  7/7/2022 0326 by Zhanna Cadet RN  Outcome: Ongoing, Progressing     Problem: Fall Injury Risk  Goal: Absence of Fall and Fall-Related Injury  7/7/2022 0326 by Zhanna Cadet RN  Outcome: Ongoing, Progressing  7/7/2022 0326 by Zhanna Cadet RN  Outcome: Ongoing, Progressing  Intervention: Identify and Manage Contributors  Recent Flowsheet Documentation  Taken 7/7/2022 0300 by Zhanna Cadet RN  Medication Review/Management: medications reviewed  Taken 7/7/2022 0200 by Zhanna Cadet RN  Medication Review/Management: medications reviewed  Self-Care Promotion:   independence encouraged   BADL personal objects within reach   BADL personal routines maintained  Taken 7/7/2022 0100 by Zhanna Cadet, RN  Medication Review/Management: medications reviewed  Taken 7/6/2022 2300 by Zhanna Cadet, RN  Medication Review/Management: medications reviewed  Taken 7/6/2022 2210 by Zhanna Cadet, RN  Medication Review/Management: medications reviewed  Self-Care Promotion:   independence encouraged   BADL personal objects within reach   BADL personal routines maintained  Taken 7/6/2022 1900 by Zhanna Cadet, RN  Medication Review/Management: medications reviewed  Intervention: Promote Injury-Free Environment  Recent Flowsheet Documentation  Taken 7/7/2022 0300 by Zhanna Cadet RN  Safety Promotion/Fall Prevention:   activity  supervised   assistive device/personal items within reach   clutter free environment maintained   fall prevention program maintained   nonskid shoes/slippers when out of bed   room organization consistent   safety round/check completed  Taken 7/7/2022 0200 by Zhanna Cadet RN  Safety Promotion/Fall Prevention:   activity supervised   assistive device/personal items within reach   clutter free environment maintained   fall prevention program maintained   nonskid shoes/slippers when out of bed   room organization consistent   safety round/check completed  Taken 7/7/2022 0100 by Zhanna Cadet RN  Safety Promotion/Fall Prevention:   activity supervised   assistive device/personal items within reach   clutter free environment maintained   fall prevention program maintained   nonskid shoes/slippers when out of bed   room organization consistent   safety round/check completed  Taken 7/6/2022 2300 by Zhanna Cadet RN  Safety Promotion/Fall Prevention:   activity supervised   assistive device/personal items within reach   clutter free environment maintained   fall prevention program maintained   nonskid shoes/slippers when out of bed   room organization consistent   safety round/check completed  Taken 7/6/2022 2210 by Zhanna Cadet, RN  Safety Promotion/Fall Prevention:   activity supervised   assistive device/personal items within reach   clutter free environment maintained   fall prevention program maintained   nonskid shoes/slippers when out of bed   safety round/check completed   room organization consistent  Taken 7/6/2022 1900 by Zhanna Cadet, RN  Safety Promotion/Fall Prevention:   activity supervised   assistive device/personal items within reach   clutter free environment maintained   fall prevention program maintained   nonskid shoes/slippers when out of bed   room organization consistent   safety round/check completed     Problem: Skin Injury Risk Increased  Goal: Skin Health and Integrity  7/7/2022  0326 by Zhanna Cadet RN  Outcome: Ongoing, Progressing  7/7/2022 0326 by Zhanna Cadet RN  Outcome: Ongoing, Progressing  Intervention: Promote and Optimize Oral Intake  Recent Flowsheet Documentation  Taken 7/7/2022 0200 by Zhanna Cadet RN  Oral Nutrition Promotion: rest periods promoted  Taken 7/6/2022 2210 by Zhanna Cadet RN  Oral Nutrition Promotion: rest periods promoted  Intervention: Optimize Skin Protection  Recent Flowsheet Documentation  Taken 7/7/2022 0200 by Zhanna Cadet RN  Pressure Reduction Techniques: weight shift assistance provided  Head of Bed (HOB) Positioning: HOB at 30-45 degrees  Pressure Reduction Devices: pressure-redistributing mattress utilized  Skin Protection:   adhesive use limited   drying agents applied   incontinence pads utilized  Taken 7/6/2022 2210 by Zhanna Cadet RN  Pressure Reduction Techniques:   weight shift assistance provided   frequent weight shift encouraged  Head of Bed (HOB) Positioning: HOB at 30-45 degrees  Pressure Reduction Devices: pressure-redistributing mattress utilized  Skin Protection:   adhesive use limited   drying agents applied   incontinence pads utilized     Problem: Adjustment to Illness (Stroke, Ischemic/Transient Ischemic Attack)  Goal: Optimal Coping  Outcome: Ongoing, Progressing  Intervention: Support Psychosocial Response to Stroke  Recent Flowsheet Documentation  Taken 7/7/2022 0200 by Zhanna Cadet RN  Family/Support System Care:   support provided   self-care encouraged  Taken 7/6/2022 2210 by Zhanna Cadet RN  Family/Support System Care:   self-care encouraged   support provided     Problem: Bowel Elimination Impaired (Stroke, Ischemic/Transient Ischemic Attack)  Goal: Effective Bowel Elimination  Outcome: Ongoing, Progressing     Problem: Cerebral Tissue Perfusion (Stroke, Ischemic/Transient Ischemic Attack)  Goal: Optimal Cerebral Tissue Perfusion  Outcome: Ongoing, Progressing     Problem: Cognitive Impairment  (Stroke, Ischemic/Transient Ischemic Attack)  Goal: Optimal Cognitive Function  Outcome: Ongoing, Progressing     Problem: Communication Impairment (Stroke, Ischemic/Transient Ischemic Attack)  Goal: Improved Communication Skills  Outcome: Ongoing, Progressing  Intervention: Optimize Communication Skills  Recent Flowsheet Documentation  Taken 7/7/2022 0200 by Zhanna Cadet RN  Communication Enhancement Strategies:   call light answered in person   communication board used   extra time allowed for response   family involved in communication plan  Taken 7/6/2022 2210 by Zhanna Cadet RN  Communication Enhancement Strategies:   call light answered in person   communication board used   extra time allowed for response   family involved in communication plan     Problem: Functional Ability Impaired (Stroke, Ischemic/Transient Ischemic Attack)  Goal: Optimal Functional Ability  Outcome: Ongoing, Progressing  Intervention: Optimize Functional Ability  Recent Flowsheet Documentation  Taken 7/7/2022 0200 by Zhanna Cadet RN  Activity Management: activity adjusted per tolerance  Self-Care Promotion:   independence encouraged   BADL personal objects within reach   BADL personal routines maintained  Taken 7/6/2022 2210 by Zhanna Cadet RN  Activity Management: activity adjusted per tolerance  Self-Care Promotion:   independence encouraged   BADL personal objects within reach   BADL personal routines maintained     Problem: Respiratory Compromise (Stroke, Ischemic/Transient Ischemic Attack)  Goal: Effective Oxygenation and Ventilation  Outcome: Ongoing, Progressing  Intervention: Optimize Oxygenation and Ventilation  Recent Flowsheet Documentation  Taken 7/7/2022 0200 by Zhanna Cadet RN  Head of Bed (HOB) Positioning: HOB at 30-45 degrees  Taken 7/6/2022 2210 by Zhanna Cadet RN  Head of Bed (HOB) Positioning: HOB at 30-45 degrees     Problem: Sensorimotor Impairment (Stroke, Ischemic/Transient Ischemic  Attack)  Goal: Improved Sensorimotor Function  Outcome: Ongoing, Progressing  Intervention: Optimize Range of Motion, Motor Control and Function  Recent Flowsheet Documentation  Taken 7/7/2022 0200 by Zhanna Cadet RN  Positioning/Transfer Devices:   pillows   in use  Taken 7/6/2022 2210 by Zhanna Cadet RN  Positioning/Transfer Devices:   pillows   in use  Range of Motion: active ROM (range of motion) encouraged  Intervention: Optimize Sensory and Perceptual Ability  Recent Flowsheet Documentation  Taken 7/7/2022 0200 by Zhanna Cadet RN  Pressure Reduction Techniques: weight shift assistance provided  Pressure Reduction Devices: pressure-redistributing mattress utilized  Taken 7/6/2022 2210 by Zhanna Cadet RN  Pressure Reduction Techniques:   weight shift assistance provided   frequent weight shift encouraged  Pressure Reduction Devices: pressure-redistributing mattress utilized     Problem: Swallowing Impairment (Stroke, Ischemic/Transient Ischemic Attack)  Goal: Optimal Eating and Swallowing without Aspiration  Outcome: Ongoing, Progressing     Problem: Urinary Elimination Impaired (Stroke, Ischemic/Transient Ischemic Attack)  Goal: Effective Urinary Elimination  Outcome: Ongoing, Progressing

## 2022-07-07 NOTE — NURSING NOTE
Rehab follow up and patient's insurance has approved her IRF admission with the start date of 7/8/22. Notified  and requested a COVID test.

## 2022-07-07 NOTE — PLAN OF CARE
Goal Outcome Evaluation:              Outcome Evaluation: VSS at this time. Patient c/o nausea and back pain. Patient also constipated, see orders. A/Ox4. PRN pain medications given per order. Patient was able to have medium bowel movement, paitent describes bowel movement as painful and states that she was lightheaded while using the bedside commode. Paitent up with assist, bed in low position, call light in reach. Will continue to monitor.

## 2022-07-07 NOTE — PROGRESS NOTES
Nephrology Progress Note      Subjective     Feeling fine, no complaints.     Objective       Vital signs :     Temp:  [98.3 °F (36.8 °C)-99.4 °F (37.4 °C)] 98.4 °F (36.9 °C)  Heart Rate:  [75-95] 87  Resp:  [12-20] 20  BP: ()/() 144/66      Intake/Output Summary (Last 24 hours) at 7/7/2022 1447  Last data filed at 7/7/2022 1200  Gross per 24 hour   Intake 540 ml   Output 1356 ml   Net -816 ml       Physical Exam:    General Appearance : not in acute distress  Lungs : clear to auscultation, respirations regular  Heart :  regular rhythm & normal rate, normal S1, S2 and no murmur, no rub  Abdomen : normal bowel sounds, no masses, no hepatomegaly, no splenomegaly, soft non-tender and no guarding  Extremities : no edema, no cyanosis and no redness  Neurologic :   orientated to person, place, time and situation, Grossly no focal deficits      Laboratory Data :     Albumin Albumin   Date Value Ref Range Status   07/07/2022 1.91 (L) 3.50 - 5.20 g/dL Final   07/05/2022 2.47 (L) 3.50 - 5.20 g/dL Final      Magnesium Magnesium   Date Value Ref Range Status   07/07/2022 2.1 1.6 - 2.4 mg/dL Final   07/06/2022 1.8 1.6 - 2.4 mg/dL Final   07/05/2022 1.4 (L) 1.6 - 2.4 mg/dL Final          PTH               No results found for: PTH    CBC and coagulation:  Results from last 7 days   Lab Units 07/07/22  0104 07/06/22  0143 07/05/22  0019 07/04/22  0035 07/03/22  1727 07/03/22  1445 07/03/22  0022 07/02/22  0512 07/01/22  1727   PROCALCITONIN ng/mL  --   --   --   --   --   --  0.27*  --   --    LACTATE mmol/L  --   --   --   --  1.6 2.6*  --   --   --    SED RATE mm/hr  --   --   --   --   --   --   --  8  --    CRP mg/dL  --   --   --   --   --   --   --  <0.30  --    WBC 10*3/mm3 5.27 4.04 6.77   < >  --  7.83 8.28 6.62 4.43   HEMOGLOBIN g/dL 8.1* 7.4* 8.7*   < >  --  8.5* 9.8* 11.2* 11.8*   HEMATOCRIT % 24.9* 23.3* 26.7*   < >  --  26.7* 30.4* 34.1 35.8   MCV fL 88.0 91.0 88.7   < >  --  90.5 89.9 88.6 89.5   MCHC  g/dL 32.5 31.8 32.6   < >  --  31.8 32.2 32.8 33.0   PLATELETS 10*3/mm3 99* 88* 109*   < >  --  137* 138* 152 131*   INR   --   --   --   --   --   --   --   --  0.97    < > = values in this interval not displayed.     Acid/base balance:  Results from last 7 days   Lab Units 07/01/22  1722   PH, ARTERIAL pH units 7.460*   PO2 ART mm Hg 75.3*   PCO2, ARTERIAL mm Hg 39.6   HCO3 ART mmol/L 28.1*     Renal and electrolytes:  Results from last 7 days   Lab Units 07/07/22  0104 07/06/22  0143 07/05/22  0019 07/04/22  0035 07/03/22  1445 07/03/22  0022 07/02/22  0512   SODIUM mmol/L 132* 134* 136 135* 134* 141 142   POTASSIUM mmol/L 3.8 3.7 3.7 2.9* 3.5 3.3* 3.4*   MAGNESIUM mg/dL 2.1 1.8 1.4*  --   --   --  1.6   CHLORIDE mmol/L 98 100 101 99 98 101 101   CO2 mmol/L 26.9 27.2 26.5 26.7 27.2 26.7 25.1   BUN mg/dL 48* 41* 40* 42* 43* 48* 42*   CREATININE mg/dL 2.83* 2.93* 3.12* 3.31* 3.40* 3.82* 3.78*   CALCIUM mg/dL 7.2* 7.1* 7.8* 7.9* 7.8* 8.1* 8.7   PHOSPHORUS mg/dL  --   --  2.7  --   --  4.7* 4.0     Estimated Creatinine Clearance: 14.5 mL/min (A) (by C-G formula based on SCr of 2.83 mg/dL (H)).    Liver and pancreatic function:  Results from last 7 days   Lab Units 07/07/22  0104 07/05/22  0019 07/03/22  1445 07/01/22  1727   ALBUMIN g/dL 1.91* 2.47* 2.45* 3.41*   BILIRUBIN mg/dL <0.2 0.2 0.3 0.5   ALK PHOS U/L 190* 85 63 82   AST (SGOT) U/L 18 16 16 22   ALT (SGPT) U/L 13 12 11 16   LIPASE U/L  --   --   --  12*         Cardiac:      Liver and pancreatic function:  Results from last 7 days   Lab Units 07/07/22  0104 07/05/22  0019 07/03/22  1445 07/01/22  1727   ALBUMIN g/dL 1.91* 2.47* 2.45* 3.41*   BILIRUBIN mg/dL <0.2 0.2 0.3 0.5   ALK PHOS U/L 190* 85 63 82   AST (SGOT) U/L 18 16 16 22   ALT (SGPT) U/L 13 12 11 16   LIPASE U/L  --   --   --  12*       Medications :     aspirin, 81 mg, Oral, Daily  atorvastatin, 80 mg, Oral, Nightly  bisacodyl, 10 mg, Rectal, Daily  cetirizine, 5 mg, Oral, Daily  docosanol, 1  application, Topical, 5x Daily  docusate sodium, 100 mg, Oral, BID  First Mouthwash (Magic Mouthwash), 10 mL, Swish & Spit, Q6H  FLUoxetine, 40 mg, Oral, Daily  folic acid, 1 mg, Oral, Daily  gabapentin, 300 mg, Oral, Daily  Insulin Aspart, 0-7 Units, Subcutaneous, TID AC  lactulose, 30 g, Oral, Once  levothyroxine, 25 mcg, Oral, Q AM  metoclopramide, 5 mg, Oral, TID AC  multivitamin, 1 tablet, Oral, Daily  mupirocin, 1 application, Topical, Q24H  pantoprazole, 40 mg, Oral, Q AM  rOPINIRole, 0.5 mg, Oral, BID  sodium chloride, 10 mL, Intravenous, Q12H  sodium chloride, 10 mL, Intravenous, Q12H  sodium chloride, 10 mL, Intravenous, Q12H  sodium chloride, 10 mL, Intravenous, Q12H  sodium chloride, 10 mL, Intravenous, Q12H  vitamin B-1, 100 mg, Oral, Daily      norepinephrine, 0.02-0.3 mcg/kg/min, Last Rate: Stopped (07/06/22 0746)          Assessment & Plan     1.  End-stage renal disease on peritoneal dialysis  2.  Acute CVA left frontal lobe  3.  Severe orthostatic hypotension  4.  Supine hypertension  5.  Type 2 diabetes with neuropathy  6.  Anemia of CKD  7.  Vomiting possible gastroparesis     Doing well, and grossly stable from renal stands point,   Continue current [facility] orders      Nicholas Arroyo MD  07/07/22  14:47 EDT

## 2022-07-07 NOTE — PROGRESS NOTES
Nephrology Progress Note      Subjective     Patient denied any chest pain or shortness of breath    Objective       Vital signs :     Temp:  [98.3 °F (36.8 °C)-99.4 °F (37.4 °C)] 98.4 °F (36.9 °C)  Heart Rate:  [75-95] 87  Resp:  [12-20] 20  BP: ()/() 144/66      Intake/Output Summary (Last 24 hours) at 7/7/2022 1446  Last data filed at 7/7/2022 1200  Gross per 24 hour   Intake 540 ml   Output 1356 ml   Net -816 ml       Physical Exam:    General Appearance : not in acute distress  Lungs : clear to auscultation, respirations regular  Heart :  regular rhythm & normal rate, normal S1, S2 and no murmur, no rub  Abdomen : normal bowel sounds, no masses, no hepatomegaly, no splenomegaly, soft non-tender and no guarding  Extremities : no edema, no cyanosis and no redness  Neurologic :   orientated to person, place, time and situation, Grossly no focal deficits      Laboratory Data :     Albumin Albumin   Date Value Ref Range Status   07/07/2022 1.91 (L) 3.50 - 5.20 g/dL Final   07/05/2022 2.47 (L) 3.50 - 5.20 g/dL Final      Magnesium Magnesium   Date Value Ref Range Status   07/07/2022 2.1 1.6 - 2.4 mg/dL Final   07/06/2022 1.8 1.6 - 2.4 mg/dL Final   07/05/2022 1.4 (L) 1.6 - 2.4 mg/dL Final          PTH               No results found for: PTH    CBC and coagulation:  Results from last 7 days   Lab Units 07/07/22  0104 07/06/22  0143 07/05/22  0019 07/04/22  0035 07/03/22  1727 07/03/22  1445 07/03/22  0022 07/02/22  0512 07/01/22  1727   PROCALCITONIN ng/mL  --   --   --   --   --   --  0.27*  --   --    LACTATE mmol/L  --   --   --   --  1.6 2.6*  --   --   --    SED RATE mm/hr  --   --   --   --   --   --   --  8  --    CRP mg/dL  --   --   --   --   --   --   --  <0.30  --    WBC 10*3/mm3 5.27 4.04 6.77   < >  --  7.83 8.28 6.62 4.43   HEMOGLOBIN g/dL 8.1* 7.4* 8.7*   < >  --  8.5* 9.8* 11.2* 11.8*   HEMATOCRIT % 24.9* 23.3* 26.7*   < >  --  26.7* 30.4* 34.1 35.8   MCV fL 88.0 91.0 88.7   < >  --  90.5  89.9 88.6 89.5   MCHC g/dL 32.5 31.8 32.6   < >  --  31.8 32.2 32.8 33.0   PLATELETS 10*3/mm3 99* 88* 109*   < >  --  137* 138* 152 131*   INR   --   --   --   --   --   --   --   --  0.97    < > = values in this interval not displayed.     Acid/base balance:  Results from last 7 days   Lab Units 07/01/22  1722   PH, ARTERIAL pH units 7.460*   PO2 ART mm Hg 75.3*   PCO2, ARTERIAL mm Hg 39.6   HCO3 ART mmol/L 28.1*     Renal and electrolytes:  Results from last 7 days   Lab Units 07/07/22  0104 07/06/22  0143 07/05/22  0019 07/04/22  0035 07/03/22  1445 07/03/22  0022 07/02/22  0512   SODIUM mmol/L 132* 134* 136 135* 134* 141 142   POTASSIUM mmol/L 3.8 3.7 3.7 2.9* 3.5 3.3* 3.4*   MAGNESIUM mg/dL 2.1 1.8 1.4*  --   --   --  1.6   CHLORIDE mmol/L 98 100 101 99 98 101 101   CO2 mmol/L 26.9 27.2 26.5 26.7 27.2 26.7 25.1   BUN mg/dL 48* 41* 40* 42* 43* 48* 42*   CREATININE mg/dL 2.83* 2.93* 3.12* 3.31* 3.40* 3.82* 3.78*   CALCIUM mg/dL 7.2* 7.1* 7.8* 7.9* 7.8* 8.1* 8.7   PHOSPHORUS mg/dL  --   --  2.7  --   --  4.7* 4.0     Estimated Creatinine Clearance: 14.5 mL/min (A) (by C-G formula based on SCr of 2.83 mg/dL (H)).    Liver and pancreatic function:  Results from last 7 days   Lab Units 07/07/22  0104 07/05/22  0019 07/03/22  1445 07/01/22  1727   ALBUMIN g/dL 1.91* 2.47* 2.45* 3.41*   BILIRUBIN mg/dL <0.2 0.2 0.3 0.5   ALK PHOS U/L 190* 85 63 82   AST (SGOT) U/L 18 16 16 22   ALT (SGPT) U/L 13 12 11 16   LIPASE U/L  --   --   --  12*         Cardiac:      Liver and pancreatic function:  Results from last 7 days   Lab Units 07/07/22  0104 07/05/22  0019 07/03/22  1445 07/01/22  1727   ALBUMIN g/dL 1.91* 2.47* 2.45* 3.41*   BILIRUBIN mg/dL <0.2 0.2 0.3 0.5   ALK PHOS U/L 190* 85 63 82   AST (SGOT) U/L 18 16 16 22   ALT (SGPT) U/L 13 12 11 16   LIPASE U/L  --   --   --  12*       Medications :     aspirin, 81 mg, Oral, Daily  atorvastatin, 80 mg, Oral, Nightly  bisacodyl, 10 mg, Rectal, Daily  cetirizine, 5 mg, Oral,  Daily  docosanol, 1 application, Topical, 5x Daily  docusate sodium, 100 mg, Oral, BID  First Mouthwash (Magic Mouthwash), 10 mL, Swish & Spit, Q6H  FLUoxetine, 40 mg, Oral, Daily  folic acid, 1 mg, Oral, Daily  gabapentin, 300 mg, Oral, Daily  Insulin Aspart, 0-7 Units, Subcutaneous, TID AC  lactulose, 30 g, Oral, Once  levothyroxine, 25 mcg, Oral, Q AM  metoclopramide, 5 mg, Oral, TID AC  multivitamin, 1 tablet, Oral, Daily  mupirocin, 1 application, Topical, Q24H  pantoprazole, 40 mg, Oral, Q AM  rOPINIRole, 0.5 mg, Oral, BID  sodium chloride, 10 mL, Intravenous, Q12H  sodium chloride, 10 mL, Intravenous, Q12H  sodium chloride, 10 mL, Intravenous, Q12H  sodium chloride, 10 mL, Intravenous, Q12H  sodium chloride, 10 mL, Intravenous, Q12H  vitamin B-1, 100 mg, Oral, Daily      norepinephrine, 0.02-0.3 mcg/kg/min, Last Rate: Stopped (07/06/22 0746)          Assessment & Plan     1.  End-stage renal disease on peritoneal dialysis  2.  Acute CVA left frontal lobe  3.  Severe orthostatic hypotension  4.  Supine hypertension  5.  Type 2 diabetes with neuropathy  6.  Anemia of CKD  7.  Vomiting possible gastroparesis     Continue on dialysis with same [facility] orders.       Nicholas Arroyo MD  07/07/22  14:46 EDT

## 2022-07-08 ENCOUNTER — APPOINTMENT (OUTPATIENT)
Dept: GENERAL RADIOLOGY | Facility: HOSPITAL | Age: 62
End: 2022-07-08

## 2022-07-08 LAB
ALBUMIN SERPL-MCNC: 2.22 G/DL (ref 3.5–5.2)
ALBUMIN/GLOB SERPL: 1 G/DL
ALP SERPL-CCNC: 108 U/L (ref 39–117)
ALT SERPL W P-5'-P-CCNC: 11 U/L (ref 1–33)
ANION GAP SERPL CALCULATED.3IONS-SCNC: 7.7 MMOL/L (ref 5–15)
AST SERPL-CCNC: 17 U/L (ref 1–32)
BASOPHILS # BLD AUTO: 0.03 10*3/MM3 (ref 0–0.2)
BASOPHILS NFR BLD AUTO: 0.6 % (ref 0–1.5)
BILIRUB SERPL-MCNC: 0.2 MG/DL (ref 0–1.2)
BUN SERPL-MCNC: 41 MG/DL (ref 8–23)
BUN/CREAT SERPL: 15.4 (ref 7–25)
CALCIUM SPEC-SCNC: 7.5 MG/DL (ref 8.6–10.5)
CHLORIDE SERPL-SCNC: 99 MMOL/L (ref 98–107)
CO2 SERPL-SCNC: 27.3 MMOL/L (ref 22–29)
CREAT SERPL-MCNC: 2.67 MG/DL (ref 0.57–1)
DEPRECATED RDW RBC AUTO: 44.9 FL (ref 37–54)
EGFRCR SERPLBLD CKD-EPI 2021: 19.4 ML/MIN/1.73
EOSINOPHIL # BLD AUTO: 0.14 10*3/MM3 (ref 0–0.4)
EOSINOPHIL NFR BLD AUTO: 2.8 % (ref 0.3–6.2)
ERYTHROCYTE [DISTWIDTH] IN BLOOD BY AUTOMATED COUNT: 13.6 % (ref 12.3–15.4)
GLOBULIN UR ELPH-MCNC: 2.2 GM/DL
GLUCOSE BLDC GLUCOMTR-MCNC: 256 MG/DL (ref 70–130)
GLUCOSE BLDC GLUCOMTR-MCNC: 344 MG/DL (ref 70–130)
GLUCOSE BLDC GLUCOMTR-MCNC: 75 MG/DL (ref 70–130)
GLUCOSE SERPL-MCNC: 248 MG/DL (ref 65–99)
HCT VFR BLD AUTO: 25.6 % (ref 34–46.6)
HGB BLD-MCNC: 8.3 G/DL (ref 12–15.9)
IMM GRANULOCYTES # BLD AUTO: 0.01 10*3/MM3 (ref 0–0.05)
IMM GRANULOCYTES NFR BLD AUTO: 0.2 % (ref 0–0.5)
LYMPHOCYTES # BLD AUTO: 1.49 10*3/MM3 (ref 0.7–3.1)
LYMPHOCYTES NFR BLD AUTO: 30.3 % (ref 19.6–45.3)
MCH RBC QN AUTO: 29.2 PG (ref 26.6–33)
MCHC RBC AUTO-ENTMCNC: 32.4 G/DL (ref 31.5–35.7)
MCV RBC AUTO: 90.1 FL (ref 79–97)
MONOCYTES # BLD AUTO: 0.32 10*3/MM3 (ref 0.1–0.9)
MONOCYTES NFR BLD AUTO: 6.5 % (ref 5–12)
NEUTROPHILS NFR BLD AUTO: 2.93 10*3/MM3 (ref 1.7–7)
NEUTROPHILS NFR BLD AUTO: 59.6 % (ref 42.7–76)
NRBC BLD AUTO-RTO: 0 /100 WBC (ref 0–0.2)
PLATELET # BLD AUTO: 125 10*3/MM3 (ref 140–450)
PMV BLD AUTO: 10.1 FL (ref 6–12)
POTASSIUM SERPL-SCNC: 3.5 MMOL/L (ref 3.5–5.2)
PROT SERPL-MCNC: 4.4 G/DL (ref 6–8.5)
RBC # BLD AUTO: 2.84 10*6/MM3 (ref 3.77–5.28)
SODIUM SERPL-SCNC: 134 MMOL/L (ref 136–145)
WBC NRBC COR # BLD: 4.92 10*3/MM3 (ref 3.4–10.8)

## 2022-07-08 PROCEDURE — 74248 X-RAY SM INT F-THRU STD: CPT

## 2022-07-08 PROCEDURE — 80053 COMPREHEN METABOLIC PANEL: CPT | Performed by: INTERNAL MEDICINE

## 2022-07-08 PROCEDURE — 74240 X-RAY XM UPR GI TRC 1CNTRST: CPT | Performed by: RADIOLOGY

## 2022-07-08 PROCEDURE — 82962 GLUCOSE BLOOD TEST: CPT

## 2022-07-08 PROCEDURE — 99232 SBSQ HOSP IP/OBS MODERATE 35: CPT | Performed by: INTERNAL MEDICINE

## 2022-07-08 PROCEDURE — 94761 N-INVAS EAR/PLS OXIMETRY MLT: CPT

## 2022-07-08 PROCEDURE — 85025 COMPLETE CBC W/AUTO DIFF WBC: CPT | Performed by: INTERNAL MEDICINE

## 2022-07-08 PROCEDURE — 94799 UNLISTED PULMONARY SVC/PX: CPT

## 2022-07-08 PROCEDURE — 63710000001 INSULIN ASPART PER 5 UNITS: Performed by: INTERNAL MEDICINE

## 2022-07-08 PROCEDURE — 74240 X-RAY XM UPR GI TRC 1CNTRST: CPT

## 2022-07-08 PROCEDURE — 97110 THERAPEUTIC EXERCISES: CPT

## 2022-07-08 PROCEDURE — 97116 GAIT TRAINING THERAPY: CPT

## 2022-07-08 PROCEDURE — 74248 X-RAY SM INT F-THRU STD: CPT | Performed by: RADIOLOGY

## 2022-07-08 RX ORDER — HYDRALAZINE HYDROCHLORIDE 50 MG/1
100 TABLET, FILM COATED ORAL ONCE
Status: COMPLETED | OUTPATIENT
Start: 2022-07-08 | End: 2022-07-08

## 2022-07-08 RX ORDER — HYDRALAZINE HYDROCHLORIDE 25 MG/1
100 TABLET, FILM COATED ORAL 3 TIMES DAILY PRN
Status: DISCONTINUED | OUTPATIENT
Start: 2022-07-08 | End: 2022-07-10 | Stop reason: HOSPADM

## 2022-07-08 RX ORDER — CLONIDINE HYDROCHLORIDE 0.1 MG/1
0.1 TABLET ORAL 3 TIMES DAILY PRN
Status: DISCONTINUED | OUTPATIENT
Start: 2022-07-08 | End: 2022-07-10 | Stop reason: HOSPADM

## 2022-07-08 RX ADMIN — HYDROCODONE BITARTRATE AND ACETAMINOPHEN 1 TABLET: 10; 325 TABLET ORAL at 15:21

## 2022-07-08 RX ADMIN — Medication 10 ML: at 10:19

## 2022-07-08 RX ADMIN — Medication 10 ML: at 21:46

## 2022-07-08 RX ADMIN — Medication 0.02 MCG/KG/MIN: at 01:21

## 2022-07-08 RX ADMIN — INSULIN ASPART 4 UNITS: 100 INJECTION, SOLUTION INTRAVENOUS; SUBCUTANEOUS at 17:52

## 2022-07-08 RX ADMIN — Medication 10 ML: at 21:45

## 2022-07-08 RX ADMIN — ROPINIROLE HYDROCHLORIDE 0.5 MG: 0.25 TABLET, FILM COATED ORAL at 21:31

## 2022-07-08 RX ADMIN — Medication 10 ML: at 10:18

## 2022-07-08 RX ADMIN — METOCLOPRAMIDE 5 MG: 10 TABLET ORAL at 11:46

## 2022-07-08 RX ADMIN — HYDRALAZINE HYDROCHLORIDE 100 MG: 50 TABLET, FILM COATED ORAL at 11:46

## 2022-07-08 RX ADMIN — CLONIDINE HYDROCHLORIDE 0.1 MG: 0.1 TABLET ORAL at 00:17

## 2022-07-08 RX ADMIN — METOCLOPRAMIDE 5 MG: 10 TABLET ORAL at 17:52

## 2022-07-08 RX ADMIN — ATORVASTATIN CALCIUM 80 MG: 40 TABLET, FILM COATED ORAL at 21:31

## 2022-07-08 RX ADMIN — HYDROCODONE BITARTRATE AND ACETAMINOPHEN 1 TABLET: 10; 325 TABLET ORAL at 00:17

## 2022-07-08 RX ADMIN — TIZANIDINE 2 MG: 4 TABLET ORAL at 00:18

## 2022-07-08 RX ADMIN — HYDRALAZINE HYDROCHLORIDE 100 MG: 25 TABLET, FILM COATED ORAL at 21:49

## 2022-07-08 RX ADMIN — TIZANIDINE 2 MG: 4 TABLET ORAL at 21:32

## 2022-07-08 RX ADMIN — HYDROXYZINE HYDROCHLORIDE 25 MG: 25 TABLET ORAL at 21:33

## 2022-07-08 RX ADMIN — INSULIN ASPART 4 UNITS: 100 INJECTION, SOLUTION INTRAVENOUS; SUBCUTANEOUS at 08:28

## 2022-07-08 NOTE — PLAN OF CARE
Goal Outcome Evaluation:              Outcome Evaluation: Patient resting in bed at this time. BP continues to trend up and down. Patient up to BSC. No complaints at this time, will continue to monitor.

## 2022-07-08 NOTE — CASE MANAGEMENT/SOCIAL WORK
Discharge Planning Assessment  [facility]     Patient Name: Reny Elena  MRN: [id number]  Today's Date: 7/8/2022    Admit Date: 7/1/2022     Discharge Plan     Row Name 07/08/22 0953       Plan    Plan Pt has been accepted by inpatient rehab when medically stable. SS notified that discharge to inpatient rehab will be postponed until Monday to monitor pt's blood pressure, bed will be held. SS following.              Continued Care and Services - Admitted Since 7/1/2022     Destination Coordination complete.    Service Provider Request Status Selected Services Address Phone Fax Patient Preferred    [facility]   Selected Inpatient Rehabilitation [address] [place] [id number] [phone] --              DELROY Son

## 2022-07-08 NOTE — PROGRESS NOTES
[facility] HOSPITALIST PROGRESS NOTE    Subjective     History:   Reny Elena is a 62 y.o. female admitted on 7/1/2022 secondary to <principal problem not specified>     Procedures:   7/2/22: Right IJ central line placement    CC: Follow up CVA    Patient seen and examined with GLORIA Farah. Awake and alert. Continues to report generalized weakness. Reports some improvement in her nausea. Constipation improved with bowel stimulation. No reported CP, dyspnea or palpitations. BP has fluctuated with hypertension and subsequent hypotension following Clonidine requiring vasopressor support briefly. BP improved this AM.      History taken from: patient, chart, and RN.      Objective     Vital Signs  Temp:  [97.6 °F (36.4 °C)-99.5 °F (37.5 °C)] 97.7 °F (36.5 °C)  Heart Rate:  [] 101  Resp:  [12-23] 14  BP: ()/() 212/89    Intake/Output Summary (Last 24 hours) at 7/8/2022 1634  Last data filed at 7/8/2022 1200  Gross per 24 hour   Intake 240 ml   Output --   Net 240 ml         Physical Exam: Unchanged from previous.   General:    Awake, alert, in no acute distress, chronically ill appearing    Heart:      Normal S1 and S2. Regular rate and rhythm. No significant murmur, rubs or gallops appreciated.   Lungs:     Respirations regular, even and unlabored. Lungs clear to auscultation B/L. No wheezes, rales or rhonchi.   Abdomen:   Soft and nontender. No guarding, rebound tenderness or  organomegaly noted. Bowel sounds present x 4.   Extremities:  No clubbing, cyanosis or edema noted. Moves UE and LE equally B/L with generalized weakness.      Results Review:    Results from last 7 days   Lab Units 07/08/22  0046 07/07/22  0104 07/06/22  0143 07/05/22  0019 07/04/22  0035 07/03/22  1445 07/03/22  0022   WBC 10*3/mm3 4.92 5.27 4.04 6.77 7.10 7.83 8.28   HEMOGLOBIN g/dL 8.3* 8.1* 7.4* 8.7* 8.8* 8.5* 9.8*   PLATELETS 10*3/mm3 125* 99* 88* 109* 129* 137* 138*     Results from last 7 days   Lab  Units 07/08/22  0046 07/07/22  0104 07/06/22  0143 07/05/22  0019 07/04/22  0035 07/03/22  1445 07/03/22  0022   SODIUM mmol/L 134* 132* 134* 136 135* 134* 141   POTASSIUM mmol/L 3.5 3.8 3.7 3.7 2.9* 3.5 3.3*   CHLORIDE mmol/L 99 98 100 101 99 98 101   CO2 mmol/L 27.3 26.9 27.2 26.5 26.7 27.2 26.7   BUN mg/dL 41* 48* 41* 40* 42* 43* 48*   CREATININE mg/dL 2.67* 2.83* 2.93* 3.12* 3.31* 3.40* 3.82*   CALCIUM mg/dL 7.5* 7.2* 7.1* 7.8* 7.9* 7.8* 8.1*   GLUCOSE mg/dL 248* 425* 407* 194* 281* 233* 264*     Results from last 7 days   Lab Units 07/08/22  0046 07/07/22  0104 07/05/22  0019 07/03/22  1445 07/01/22  1727   BILIRUBIN mg/dL 0.2 <0.2 0.2 0.3 0.5   ALK PHOS U/L 108 190* 85 63 82   AST (SGOT) U/L 17 18 16 16 22   ALT (SGPT) U/L 11 13 12 11 16     Results from last 7 days   Lab Units 07/07/22  0104 07/06/22  0143 07/05/22  0019 07/02/22  0512 07/01/22  1931   MAGNESIUM mg/dL 2.1 1.8 1.4* 1.6 1.5*     Results from last 7 days   Lab Units 07/01/22  1727   INR  0.97     Results from last 7 days   Lab Units 07/02/22  0512 07/01/22  1931 07/01/22  1727   TROPONIN T ng/mL 0.071* 0.081* 0.085*       Imaging Results (Last 24 Hours)     Procedure Component Value Units Date/Time    FL Upper GI Single Contrast SBFT [[id number]] Collected: 07/08/22 1317     Updated: 07/08/22 1321    Narrative:      EXAM:    FL Upper Gastrointestinal Tract With Small Bowel Follow Through     EXAM DATE:    7/8/2022 9:00 AM     CLINICAL HISTORY:    Persistent nausea; I63.9-Cerebral infarction, unspecified;  I95.9-Hypotension, unspecified     TECHNIQUE:    Fluoroscopy of the upper gastrointestinal tract with oral contrast and  with or without KUB followed by serial images of the small bowel.   Fluoroscopic guidance was provided by a physician. Fluoroscopy exposure  time of approximately 1.18 minutes.     COMPARISON:    No relevant prior studies available.     FINDINGS:    Esophagus:  Esophageal tertiary contractions noted with mild delay  in  esophageal transit.  No esophageal stricture.  Gastroesophageal reflux  noted to level of mid thoracic esophagus.    Stomach:  Limited assessment of the gastric mucosa but no obvious  abnormalities of the stomach noted.  Normal gastric emptying.    Duodenum:  See below.      Small bowel:  Duodenum is unremarkable with no mucosal irregularity  identified.    Colon:  Small bowel transit is within normal limits with contrast  reaching the colon at approximately one hour.    Other findings:  Very small sliding hiatal hernia.       Impression:      1.  Esophageal tertiary contractions with mild esophageal dysmotility.  2.  Gastroesophageal reflux to level of mid thoracic esophagus.  3.  Unremarkable small bowel follow-through.     This report was finalized on 7/8/2022 1:19 PM by Dr. Helder Kennedy MD.               Medications:  aspirin, 81 mg, Oral, Daily  atorvastatin, 80 mg, Oral, Nightly  bisacodyl, 10 mg, Rectal, Daily  cetirizine, 5 mg, Oral, Daily  docosanol, 1 application, Topical, 5x Daily  docusate sodium, 100 mg, Oral, BID  First Mouthwash (Magic Mouthwash), 10 mL, Swish & Spit, Q6H  FLUoxetine, 40 mg, Oral, Daily  folic acid, 1 mg, Oral, Daily  gabapentin, 300 mg, Oral, Daily  Insulin Aspart, 0-7 Units, Subcutaneous, TID AC  levothyroxine, 25 mcg, Oral, Q AM  metoclopramide, 5 mg, Oral, TID AC  multivitamin, 1 tablet, Oral, Daily  mupirocin, 1 application, Topical, Q24H  pantoprazole, 40 mg, Oral, Q AM  rOPINIRole, 0.5 mg, Oral, BID  sodium chloride, 10 mL, Intravenous, Q12H  sodium chloride, 10 mL, Intravenous, Q12H  sodium chloride, 10 mL, Intravenous, Q12H  sodium chloride, 10 mL, Intravenous, Q12H  sodium chloride, 10 mL, Intravenous, Q12H  vitamin B-1, 100 mg, Oral, Daily      norepinephrine, 0.02-0.3 mcg/kg/min, Last Rate: Stopped (07/08/22 0436)            Assessment & Plan    Acute to subacute ischemic strokes: Concern for possible cardioembolic etiology vs small vessel. CT head reveals atrophy  and chronic microangiopathic change. MRI reveals a few small patchy foci of acute vs early subacute infarct involving the left frontal and left cerebellar hemisphere with extensive presumed chronic microvascular ischemic changes with multiple old lacunar infarcts. No significant stenosis noted on MRA. Echo reveals an EF of 51-55%, grade I diastolic dysfunction, trace AR and mild AS with negative saline test. Cont ASA and statin. PT/OT and SLP. Will need outpatient event monitor. Teleneurology has signed off with input appreciated.     Hypertensive urgency: Improved upon admission and weaned off Cardizem gtt. Received home Coreg and started on Procardia with subsequent hypotension requiring vasopressor support. Concern for severe orthostatic hypotension with supine HTN. Holding scheduled antihypertensives. Recurrent episodes of hypotension noted following doses of PRN Clonidine requiring vasopressor support briefly. Discussed with nephrology who has added PRN hydralazine for SBP>185. Change PRN Clonidine to PRN for SBP>220 per nephrology recs. Cont to monitor BP closely. Nephrology input appreciated.     Nausea and vomiting with recent weight loss: Concern for possible gastroparesis. No acute findings on CT. Neurology recommended LP with concern for possible hypercoagulability/underlying malignancy in the setting of CVA's. However, pt has respectfully declined LP and expressed understanding of outlined concerns. No obvious malignancy on imaging at present. Obtained gastric emptying study which is normal. Obtained SBFT which reveals esophageal tertiary contractions with mild esophageal dysmotility and reflux to level of mid thoracic esophagus with unremarkable small bowel follow-through. Cont a trial of Reglan. Cont supportive treatment.     Recent traumatic subdural hematoma: No evidence of subdural hematoma on imaging. Cont supportive treatment with close monitoring.     Hypokalemia: K+ improved and stable today. Mg  improved. PD per nephrology. Cont to monitor.      Ongoing vision changes: Pt reports ongoing vision changes for the last month. Imaging as above. Ophthalmology consulted with concern for PCO in the left eye and recs for dilated exam and possible YAG capsulotomy in their office after discharge. Ophthalmology input appreciated.     ESRD on PD: Cont PD per nephrology with input appreciated.     DM II, non-insulin dependent with neuropathy: HgbA1c 7.6. Episode of hypoglycemia on 7/3 with no further episodes reported. BG continues to fluctuate. Cont Accuchecks.     Chronic diastolic CHF: Echo as above. Appears compensated at present. PD per nephrology. Monitor volume status.     Chronic normocytic anemia: Possibly anemia of CKD. No obvious signs of bleeding. Cont to monitor.     Hyperlipidemia: Cont statin.     Hypothyroidism: TSH is elevated with normal free T4. Cont levothyroxine.     PAD: Cont ASA and statin.     DVT PPX: SCD's    Discussed with Dr. Arroyo.     Pt is at high risk 2/2 acute CVA, hypertensive urgency, fluctuating BP, DM with hypoglycemia, electrolyte abnormalities, recent subdural hematoma, CHF, ESRD on PD, unintentional weight loss.     Disposition: Inpatient rehab when medically stable.        Mark Crawford DO  07/08/22  16:34 EDT

## 2022-07-08 NOTE — THERAPY TREATMENT NOTE
Acute Care - Physical Therapy Treatment Note  EDWARD Veliz     Patient Name: Reny Elena  : 1960  MRN: [id number]  Today's Date: 2022      Visit Dx:     ICD-10-CM ICD-9-CM   1. Cerebrovascular accident (CVA), unspecified mechanism ([facility])  I63.9 434.91   2. Hypotension, unspecified hypotension type  I95.9 458.9     Patient Active Problem List   Diagnosis   • Tibia/fibula fracture   • Iron deficiency anemia   • Type 2 diabetes mellitus with hyperglycemia, with long-term current use of insulin ([facility])   • Wound of right ankle   • Cellulitis   • Metabolic encephalopathy   • History of non-ST elevation myocardial infarction (NSTEMI)   • CKD (chronic kidney disease) stage 3, GFR 30-59 ml/min ([facility])   • Elevated troponin   • HTN (hypertension)   • CVA (cerebral vascular accident) ([facility])   • Chronic diastolic CHF (congestive heart failure) ([facility])   • Decubitus ulcer of coccyx, unspecified pressure ulcer stage   • Depression   • Expressive aphasia   • Impaired mobility and ADLs   • Renal failure   • Hyperkalemia   • Subdural hematoma ([facility])   • Severe malnutrition ([facility])   • Cerebrovascular accident (CVA), unspecified mechanism ([facility])     Past Medical History:   Diagnosis Date   • Arthritis    • Closed fracture of right fibula and tibia 2018   • Depression    • Diabetic ulcer of left foot associated with type 2 diabetes mellitus ([facility]) 2017   • Diastolic dysfunction    • Disease of thyroid gland    • Elevated cholesterol    • End stage renal disease ([facility])    • Essential hypertension    • GERD (gastroesophageal reflux disease)    • History of transfusion    • Hyperlipidemia    • Iron deficiency anemia 2018   • PAD (peripheral artery disease) ([facility])    • Renal failure    • Type 2 diabetes mellitus with hyperglycemia, with long-term current use of insulin ([facility]) 2018     Past Surgical History:   Procedure Laterality Date   • ABDOMINAL SURGERY     • BACK SURGERY      Post spinal diskectomy, osteophytectomy  in one lumbar interspace   • CATARACT EXTRACTION      Left    •  SECTION     • DENTAL PROCEDURE     • ENDOSCOPY     • FRACTURE SURGERY      right leg   • HYSTERECTOMY     • INCISION AND DRAINAGE LEG Left 4/15/2017    Procedure: INCISION AND DRAINAGE LOWER EXTREMITY;  Surgeon: Basilio Morris MD;  Location: [facility] OR;  Service:    • INCISION AND DRAINAGE LEG Left 2017    Procedure: Irrigation and Debridment abcess diabetic wound left foot with deep culture;  Surgeon: Basilio Morris MD;  Location: [facility] OR;  Service:    • INSERTION PERITONEAL DIALYSIS CATHETER N/A 2021    Procedure: INSERTION PERITONEAL DIALYSIS CATHETER LAPAROSCOPIC;  Surgeon: Edy Glover MD;  Location: [facility] OR;  Service: General;  Laterality: N/A;   • KNEE ARTHROSCOPY Right    • ORIF TIBIA FRACTURE Right 2018    Procedure: TIBIAL  FRACTURE OPEN REDUCTION INTERNAL FIXATION;  Surgeon: Jung Barragan MD;  Location: [facility] OR;  Service: Orthopedics   • TOE AMPUTATION Right     5th   • TUBAL ABDOMINAL LIGATION       PT Assessment (last 12 hours)     PT Evaluation and Treatment     Row Name 22 1508          Physical Therapy Time and Intention    Subjective Information no complaints  -HR     Document Type therapy note (daily note)  -HR     Mode of Treatment physical therapy  -HR     Patient Effort adequate  -HR     Comment Pt and RN Gagan in agreement for PT  -HR     Row Name 22 1508          Cognition    Personal Safety Interventions fall prevention program maintained;gait belt;nonskid shoes/slippers when out of bed;supervised activity  -HR     Row Name 22 1503          Transfers    Transfers sit-stand transfer;stand-sit transfer;toilet transfer  -HR     Sit-Stand [place] (Transfers) contact guard  -HR     Stand-Sit [place] (Transfers) contact guard;standby assist  -HR     [place] Level (Toilet Transfer) contact guard;minimum assist (75% patient effort);verbal cues;nonverbal cues  (demo/gesture)  -HR     Assistive Device (Toilet Transfer) commode, bedside without drop arms  -HR     Row Name 07/08/22 1508          Sit-Stand Transfer    Assistive Device (Sit-Stand Transfers) walker, front-wheeled  -HR     Row Name 07/08/22 1508          Stand-Sit Transfer    Assistive Device (Stand-Sit Transfers) walker, front-wheeled  -HR     Row Name 07/08/22 1508          Toilet Transfer    Type (Toilet Transfer) stand pivot/stand step  -HR     Row Name 07/08/22 1508          Gait/Stairs (Locomotion)    Gait/Stairs Locomotion gait/ambulation assistive device  -HR     [place] Level (Gait) contact guard  -HR     Assistive Device (Gait) walker, front-wheeled  -HR     Distance in Feet (Gait) 32'  -HR     Pattern (Gait) step-through  -HR     Row Name 07/08/22 1508          Motor Skills    Therapeutic Exercise other (see comments)  LAQ, AP  -HR     Row Name 07/08/22 1508          Plan of Care Review    Outcome Evaluation Pt does well with PT on this date and walks 32' with RW CGA. EOB exercises until the need to use BSC. Transfer to BSC with min/ CGA, notified nursing.  -HR     Row Name 07/08/22 1508          Positioning and Restraints    Pre-Treatment Position in bed  -HR     Post Treatment Position bsc  -HR     On BS commode sitting;call light within reach;encouraged to call for assist;notified nsg  -HR           User Key  (r) = Recorded By, (t) = Taken By, (c) = Cosigned By    Initials Name Provider Type    HR Anaid Quinonez PTA Physical Therapist Assistant                Physical Therapy Education                 Title: PT OT SLP Therapies (Done)     Topic: Physical Therapy (Done)     Point: Mobility training (Done)     Learning Progress Summary           Patient Acceptance, E, VU by AY at 7/7/2022 1818      Show all documentation for this point (3)                 Point: Home exercise program (Done)     Learning Progress Summary           Patient Acceptance, E, VU by NOLAN at 7/7/2022 1818      Show all  documentation for this point (3)                 Point: Body mechanics (Done)     Learning Progress Summary           Patient Acceptance, E, VU by AY at 7/7/2022 1818      Show all documentation for this point (3)                 Point: Precautions (Done)     Learning Progress Summary           Patient Acceptance, E, VU by AY at 7/7/2022 1818      Show all documentation for this point (3)                             User Key     Initials Effective Dates Name Provider Type Discipline     12/20/21 -  Gagan Noble, RN Registered Nurse Nurse              PT Recommendation and Plan     Outcome Evaluation: Pt does well with PT on this date and walks 32' with RW CGA. EOB exercises until the need to use BSC. Transfer to BSC with min/ CGA, notified nursing.       Time Calculation:    PT Charges     Row Name 07/08/22 1515             Time Calculation    PT Received On 07/08/22  -HR              Time Calculation- PT    Total Timed Code Minutes- PT 25 minute(s)  -HR            User Key  (r) = Recorded By, (t) = Taken By, (c) = Cosigned By    Initials Name Provider Type    HR Anaid Quinonez PTA Physical Therapist Assistant              Therapy Charges for Today     Code Description Service Date Service Provider Modifiers Qty    66490619916 HC GAIT TRAINING EA 15 MIN 7/8/2022 Anaid Quinonez PTA GP, CQ 1    [id number] HC PT THER PROC EA 15 MIN 7/8/2022 Anaid Quinnoez PTA GP, CQ 1          PT G-Codes  Outcome Measure Options: Modified Pelican  Modified Pelican Scale: 4 - Moderately severe disability.  Unable to walk without assistance, and unable to attend to own bodily needs without assistance.    Anaid Quinonez PTA  7/8/2022

## 2022-07-08 NOTE — CONSULTS
[facility]   Consult Note    Patient Name: Reny Elena  : 1960  MRN: [id number]  Primary Care Physician:  Susan Stephens APRN  Referring Physician: No ref. provider found  Date of admission: 2022    Consults  Subjective   Subjective     Reason for Consult/ Chief Complaint: blurry vision    63 yo WF admitted for CVA c/o blurry vision, missing vision since 2021 after a fall.  Further explains it might have gotten a little worse but mostly stable since the fall.    Chest Pain   Associated symptoms include vomiting.   Vomiting   Associated symptoms include chest pain.     Reny Elena is a 62 y.o. female See above HPI    Review of Systems   Eyes: Positive for visual disturbance. Negative for photophobia, pain, discharge, redness and itching.   Cardiovascular: Positive for chest pain.   Gastrointestinal: Positive for vomiting.        Personal History     Past Medical History:   Diagnosis Date   • Arthritis    • Closed fracture of right fibula and tibia 2018   • Depression    • Diabetic ulcer of left foot associated with type 2 diabetes mellitus ([facility]) 2017   • Diastolic dysfunction    • Disease of thyroid gland    • Elevated cholesterol    • End stage renal disease ([facility])    • Essential hypertension    • GERD (gastroesophageal reflux disease)    • History of transfusion    • Hyperlipidemia    • Iron deficiency anemia 2018   • PAD (peripheral artery disease) ([facility])    • Renal failure    • Type 2 diabetes mellitus with hyperglycemia, with long-term current use of insulin ([facility]) 2018       Past Surgical History:   Procedure Laterality Date   • ABDOMINAL SURGERY     • BACK SURGERY      Post spinal diskectomy, osteophytectomy in one lumbar interspace   • CATARACT EXTRACTION      Left    •  SECTION     • DENTAL PROCEDURE     • ENDOSCOPY     • FRACTURE SURGERY      right leg   • HYSTERECTOMY     • INCISION AND DRAINAGE LEG Left 4/15/2017    Procedure: INCISION AND  DRAINAGE LOWER EXTREMITY;  Surgeon: Basilio Morris MD;  Location: [facility] OR;  Service:    • INCISION AND DRAINAGE LEG Left 5/26/2017    Procedure: Irrigation and Debridment abcess diabetic wound left foot with deep culture;  Surgeon: Basilio Morris MD;  Location: [facility] OR;  Service:    • INSERTION PERITONEAL DIALYSIS CATHETER N/A 12/16/2021    Procedure: INSERTION PERITONEAL DIALYSIS CATHETER LAPAROSCOPIC;  Surgeon: Edy Glover MD;  Location: [facility] OR;  Service: General;  Laterality: N/A;   • KNEE ARTHROSCOPY Right    • ORIF TIBIA FRACTURE Right 12/28/2018    Procedure: TIBIAL  FRACTURE OPEN REDUCTION INTERNAL FIXATION;  Surgeon: Jung Barragan MD;  Location: [facility] OR;  Service: Orthopedics   • TOE AMPUTATION Right     5th   • TUBAL ABDOMINAL LIGATION         Family History: family history includes COPD in her mother; Cancer in her mother; Depression in an other family member; Diabetes in an other family member; Heart disease in her mother. Otherwise pertinent FHx was reviewed and not pertinent to current issue.    Social History:  reports that she has never smoked. She has never used smokeless tobacco. She reports that she does not drink alcohol and does not use drugs.    Home Medications:   FLUoxetine, HYDROcodone-acetaminophen, carvedilol, cloNIDine, clopidogrel, docusate sodium, fluticasone, gabapentin, hydrALAZINE, hydrOXYzine, insulin aspart, isosorbide mononitrate, levothyroxine, lisinopril, loratadine, ondansetron ODT, pantoprazole, potassium chloride, pravastatin, rOPINIRole, tiZANidine, and traZODone    Allergies:  Allergies   Allergen Reactions   • Penicillins Hives and GI Intolerance     Patient has received cefazolin, ceftriaxone and cefepime during past admissions.   • Codeine Hives, Rash and GI Intolerance     Pt tolerates Norco @ home   • Sulfa Antibiotics Hives, Rash and GI Intolerance     NAUSEA TOO       Objective    Objective     Vitals:  Temp:  [97.6 °F (36.4 °C)-99.5 °F (37.5 °C)]  97.7 °F (36.5 °C)  Heart Rate:  [] 84  Resp:  [12-23] 14  BP: ()/() 138/79    Physical Exam  Vitals and nursing note reviewed. Exam conducted with a chaperone present.   Eyes:      General: Lids are normal. Vision grossly intact. Gaze aligned appropriately. Visual field deficit present. No allergic shiner.        Right eye: No foreign body, discharge or hordeolum.         Left eye: No foreign body, discharge or hordeolum.      Extraocular Movements: Extraocular movements intact.      Right eye: Normal extraocular motion and no nystagmus.      Left eye: Normal extraocular motion and no nystagmus.      Conjunctiva/sclera: Conjunctivae normal.      Right eye: Right conjunctiva is not injected. No chemosis, exudate or hemorrhage.     Left eye: Left conjunctiva is not injected. No chemosis, exudate or hemorrhage.     Pupils: Pupils are equal, round, and reactive to light.      Slit lamp exam:     Right eye: Anterior chamber quiet. No corneal flare, corneal ulcer, foreign body, hyphema, hypopyon, anterior chamber bulge, anterior chamber flares or photophobia.      Left eye: Anterior chamber quiet. No corneal flare, corneal ulcer, foreign body, hyphema, hypopyon, anterior chamber bulge, anterior chamber flares or photophobia.      Visual Fields: Right eye visual fields normal.      Left eye: ABS in the upper nasal quadrant. CF in the upper temporal quadrant. ABS in the lower nasal quadrant. CF in the lower temporal quadrant.      Comments: Acuity approximately 20/40 OD, 20/100 OS  Tpalp WNL, firm  CVF significant for right sided defect in left eye, right eye seemed full to confrontation  Pupils very miotic, difficult to evaluate.  Cataract in right eye, IOL in left with posterior capsular opacification   Neurological:      Mental Status: She is alert.         Result Review    Result Review:  I have personally reviewed the results from the time of this admission to 7/8/2022 13:46 EDT and agree with these  findings:  []  Laboratory list / accordion  []  Microbiology  []  Radiology  []  EKG/Telemetry   []  Cardiology/Vascular   []  Pathology  [x]  Old records  []  Other:  Most notable findings include: Significant posterior capsular opacification in left eye.      Assessment & Plan   Assessment / Plan     Brief Patient Summary:  Reny Elena is a 62 y.o. female who c/o blurry vision and has significant PCO in the left eye    Active Hospital Problems:  Active Hospital Problems    Diagnosis    • Cerebrovascular accident (CVA), unspecified mechanism ([facility])      Plan:   Follow-up for dilated exam and possible YAG capsulotomy in my office next week, after discharge.    Jeremias Lynn MD

## 2022-07-08 NOTE — PLAN OF CARE
Problem: Adult Inpatient Plan of Care  Goal: Plan of Care Review  Outcome: Ongoing, Progressing  Flowsheets (Taken 7/8/2022 0246)  Progress: no change  Plan of Care Reviewed With: patient  Goal: Patient-Specific Goal (Individualized)  Outcome: Ongoing, Progressing  Goal: Absence of Hospital-Acquired Illness or Injury  Outcome: Ongoing, Progressing  Intervention: Identify and Manage Fall Risk  Description: Perform standard risk assessment on admission using a validated tool or comprehensive approach appropriate to the patient; reassess fall risk frequently, with change in status or transfer to another level of care.  Communicate fall injury risk to interprofessional healthcare team.  Determine need for increased observation, equipment and environmental modification, such as low bed, signage and supportive, nonskid footwear.  Adjust safety measures to individual developmental age, stage and identified risk factors.  Reinforce the importance of safety and physical activity with patient and family.  Perform regular intentional rounding to assess need for position change, pain assessment and personal needs, including assistance with toileting.  Recent Flowsheet Documentation  Taken 7/8/2022 0100 by Pat Graf RN  Safety Promotion/Fall Prevention: safety round/check completed  Taken 7/7/2022 2300 by Pat Graf RN  Safety Promotion/Fall Prevention: safety round/check completed  Taken 7/7/2022 2100 by Pat Graf RN  Safety Promotion/Fall Prevention: safety round/check completed  Taken 7/7/2022 1900 by Pat Graf RN  Safety Promotion/Fall Prevention:   safety round/check completed   nonskid shoes/slippers when out of bed   lighting adjusted   fall prevention program maintained   activity supervised   clutter free environment maintained  Intervention: Prevent Skin Injury  Description: Perform a screening for skin injury risk, such as pressure or moisture associated skin damage on admission and at  regular intervals throughout hospital stay.  Keep all areas of skin (especially folds) clean and dry.  Maintain adequate skin hydration.  Relieve and redistribute pressure and protect bony prominences; implement measures based on patient-specific risk factors.  Match turning and repositioning schedule to clinical condition.  Encourage weight shift frequently; assist with reposition if unable to complete independently.  Float heels off bed; avoid pressure on the Achilles tendon.  Keep skin free from extended contact with medical devices.  Encourage functional activity and mobility, as early as tolerated.  Use aids (e.g., slide boards, mechanical lift) during transfer.  Recent Flowsheet Documentation  Taken 7/7/2022 2003 by Pat Graf RN  Body Position: position changed independently  Intervention: Prevent and Manage VTE (Venous Thromboembolism) Risk  Description: Assess for VTE (venous thromboembolism) risk.  Encourage and assist with early ambulation.  Initiate and maintain compression or other therapy, as indicated, based on identified risk in accordance with organizational protocol and provider order.  Encourage both active and passive leg exercises while in bed, if unable to ambulate.  Recent Flowsheet Documentation  Taken 7/7/2022 2003 by Pat Graf RN  Range of Motion: active ROM (range of motion) encouraged  Intervention: Prevent Infection  Description: Maintain skin and mucous membrane integrity; promote hand, oral and pulmonary hygiene.  Optimize fluid balance, nutrition, sleep and glycemic control to maximize infection resistance.  Identify potential sources of infection early to prevent or mitigate progression of infection (e.g., wound, lines, devices).  Evaluate ongoing need for invasive devices; remove promptly when no longer indicated.  Recent Flowsheet Documentation  Taken 7/7/2022 1900 by Pat Graf RN  Infection Prevention:   rest/sleep promoted   single patient room provided  Goal:  Optimal Comfort and Wellbeing  Outcome: Ongoing, Progressing  Intervention: Monitor Pain and Promote Comfort  Description: Assess pain level, treatment efficacy and patient response at regular intervals using a consistent pain scale.  Consider the presence and impact of preexisting chronic pain.  Encourage patient and caregiver involvement in pain assessment, interventions and safety measures.  Recent Flowsheet Documentation  Taken 7/7/2022 2003 by Pat Graf RN  Pain Management Interventions: see MAR  Intervention: Provide Person-Centered Care  Description: Use a family-focused approach to care.  Develop trust and rapport by proactively providing information, encouraging questions, addressing concerns and offering reassurance.  Acknowledge emotional response to hospitalization.  Recognize and utilize personal coping strategies.  Honor spiritual and cultural preferences.  Recent Flowsheet Documentation  Taken 7/7/2022 2003 by Pat Graf RN  Trust Relationship/Rapport: care explained  Goal: Readiness for Transition of Care  Outcome: Ongoing, Progressing     Problem: Fall Injury Risk  Goal: Absence of Fall and Fall-Related Injury  Outcome: Ongoing, Progressing  Intervention: Identify and Manage Contributors  Description: Develop a fall prevention plan with the patient and caregiver/family.  Provide reorientation, appropriate sensory stimulation and routines with changes in mental status to decrease risk of fall.  Promote use of personal vision and auditory aids.  Assess assistance level required for safe and effective self-care; provide support as needed, such as toileting, mobilization. For age 65 and older, implement timed toileting with assistance.  Encourage physical activity, such as performance of mobility and self-care at highest level of patient ability, multicomponent exercise program and provision of appropriate assistive devices.  If fall occurs, assess the severity of injury; implement fall  injury protocol. Determine the cause and revise fall injury prevention plan.  Regularly review medication contribution to fall risk; adjust medication administration times to minimize risk of falling.  Consider risk related to polypharmacy and age.  Balance adequate pain management with potential for oversedation.  Recent Flowsheet Documentation  Taken 7/7/2022 1900 by Pat Graf RN  Medication Review/Management: medications reviewed  Intervention: Promote Injury-Free Environment  Description: Provide a safe, barrier-free environment that encourages independent activity.  Keep care area uncluttered and well-lighted.  Determine need for increased observation or monitoring.  Avoid use of devices that minimize mobility, such as restraints or indwelling urinary catheter.  Recent Flowsheet Documentation  Taken 7/8/2022 0100 by Pat Graf RN  Safety Promotion/Fall Prevention: safety round/check completed  Taken 7/7/2022 2300 by Pat Graf RN  Safety Promotion/Fall Prevention: safety round/check completed  Taken 7/7/2022 2100 by Pat Graf RN  Safety Promotion/Fall Prevention: safety round/check completed  Taken 7/7/2022 1900 by Pat Graf RN  Safety Promotion/Fall Prevention:   safety round/check completed   nonskid shoes/slippers when out of bed   lighting adjusted   fall prevention program maintained   activity supervised   clutter free environment maintained     Problem: Skin Injury Risk Increased  Goal: Skin Health and Integrity  Outcome: Ongoing, Progressing  Intervention: Optimize Skin Protection  Description: Perform a full pressure injury risk assessment, as indicated by screening, upon admission to care unit.  Reassess skin (injury risk, full inspection) frequently (e.g., scheduled interval, with change in condition) to provide optimal early detection and prevention.  Maintain adequate tissue perfusion (e.g., encourage fluid balance; avoid crossing legs, constrictive clothing or  devices) to promote tissue oxygenation.  Maintain head of bed at lowest degree of elevation tolerated, considering medical condition and other restrictions.  Avoid positioning onto an area that remains reddened.  Minimize incontinence and moisture (e.g., toileting schedule; moisture-wicking pad, diaper or incontinence collection device; skin moisture barrier).  Cleanse skin promptly and gently when soiled utilizing a pH-balanced cleanser.  Relieve and redistribute pressure (e.g., scheduled position changes, weight shifts, use of support surface, medical device repositioning, protective dressing application, use of positioning device, microclimate control, use of pressure-injury-monitor  Encourage increased activity, such as sitting in a chair at the bedside or early mobilization, when able to tolerate.  Recent Flowsheet Documentation  Taken 7/7/2022 2003 by Pat Graf RN  Pressure Reduction Techniques: weight shift assistance provided  Pressure Reduction Devices: pressure-redistributing mattress utilized     Problem: Cerebral Tissue Perfusion (Stroke, Ischemic/Transient Ischemic Attack)  Goal: Optimal Cerebral Tissue Perfusion  Outcome: Ongoing, Progressing     Problem: Urinary Elimination Impaired (Stroke, Ischemic/Transient Ischemic Attack)  Goal: Effective Urinary Elimination  Outcome: Ongoing, Progressing   Goal Outcome Evaluation:  Plan of Care Reviewed With: patient   Levo gtt started this shift for hypotension. PRN pain med given for c/o chronic back pain.  Patient receiving PD tonight.     Progress: no change

## 2022-07-08 NOTE — PROGRESS NOTES
Nephrology Progress Note      Subjective     Pt had elevated BP last night was given clonidine that dropped her BP requiring levo.   No chest pain or shortness of breath.     Objective       Vital signs :     Temp:  [97.6 °F (36.4 °C)-99.5 °F (37.5 °C)] 98.7 °F (37.1 °C)  Heart Rate:  [] 84  Resp:  [12-23] 14  BP: ()/() 138/79    No intake or output data in the 24 hours ending 07/08/22 1205    Physical Exam:    General Appearance : not in acute distress  Lungs : clear to auscultation, respirations regular  Heart :  regular rhythm & normal rate, normal S1, S2 and no murmur, no rub  Abdomen : normal bowel sounds, no masses, no hepatomegaly, no splenomegaly, soft non-tender and no guarding  Extremities : no edema, no cyanosis and no redness  Neurologic :   orientated to person, place, time and situation, Grossly no focal deficits      Laboratory Data :     Albumin Albumin   Date Value Ref Range Status   07/08/2022 2.22 (L) 3.50 - 5.20 g/dL Final   07/07/2022 1.91 (L) 3.50 - 5.20 g/dL Final      Magnesium Magnesium   Date Value Ref Range Status   07/07/2022 2.1 1.6 - 2.4 mg/dL Final   07/06/2022 1.8 1.6 - 2.4 mg/dL Final          PTH               No results found for: PTH    CBC and coagulation:  Results from last 7 days   Lab Units 07/08/22  0046 07/07/22  0104 07/06/22  0143 07/04/22  0035 07/03/22  1727 07/03/22  1445 07/03/22  0022 07/02/22  0512 07/01/22  1727   PROCALCITONIN ng/mL  --   --   --   --   --   --  0.27*  --   --    LACTATE mmol/L  --   --   --   --  1.6 2.6*  --   --   --    SED RATE mm/hr  --   --   --   --   --   --   --  8  --    CRP mg/dL  --   --   --   --   --   --   --  <0.30  --    WBC 10*3/mm3 4.92 5.27 4.04   < >  --  7.83 8.28 6.62 4.43   HEMOGLOBIN g/dL 8.3* 8.1* 7.4*   < >  --  8.5* 9.8* 11.2* 11.8*   HEMATOCRIT % 25.6* 24.9* 23.3*   < >  --  26.7* 30.4* 34.1 35.8   MCV fL 90.1 88.0 91.0   < >  --  90.5 89.9 88.6 89.5   MCHC g/dL 32.4 32.5 31.8   < >  --  31.8 32.2 32.8  33.0   PLATELETS 10*3/mm3 125* 99* 88*   < >  --  137* 138* 152 131*   INR   --   --   --   --   --   --   --   --  0.97    < > = values in this interval not displayed.     Acid/base balance:  Results from last 7 days   Lab Units 07/01/22  1722   PH, ARTERIAL pH units 7.460*   PO2 ART mm Hg 75.3*   PCO2, ARTERIAL mm Hg 39.6   HCO3 ART mmol/L 28.1*     Renal and electrolytes:  Results from last 7 days   Lab Units 07/08/22  0046 07/07/22  0104 07/06/22  0143 07/05/22  0019 07/04/22  0035 07/03/22  1445 07/03/22  0022 07/02/22  0512   SODIUM mmol/L 134* 132* 134* 136 135*   < > 141 142   POTASSIUM mmol/L 3.5 3.8 3.7 3.7 2.9*   < > 3.3* 3.4*   MAGNESIUM mg/dL  --  2.1 1.8 1.4*  --   --   --  1.6   CHLORIDE mmol/L 99 98 100 101 99   < > 101 101   CO2 mmol/L 27.3 26.9 27.2 26.5 26.7   < > 26.7 25.1   BUN mg/dL 41* 48* 41* 40* 42*   < > 48* 42*   CREATININE mg/dL 2.67* 2.83* 2.93* 3.12* 3.31*   < > 3.82* 3.78*   CALCIUM mg/dL 7.5* 7.2* 7.1* 7.8* 7.9*   < > 8.1* 8.7   PHOSPHORUS mg/dL  --   --   --  2.7  --   --  4.7* 4.0    < > = values in this interval not displayed.     Estimated Creatinine Clearance: 15.3 mL/min (A) (by C-G formula based on SCr of 2.67 mg/dL (H)).    Liver and pancreatic function:  Results from last 7 days   Lab Units 07/08/22  0046 07/07/22  0104 07/05/22  0019 07/03/22  1445 07/01/22  1727   ALBUMIN g/dL 2.22* 1.91* 2.47*   < > 3.41*   BILIRUBIN mg/dL 0.2 <0.2 0.2   < > 0.5   ALK PHOS U/L 108 190* 85   < > 82   AST (SGOT) U/L 17 18 16   < > 22   ALT (SGPT) U/L 11 13 12   < > 16   LIPASE U/L  --   --   --   --  12*    < > = values in this interval not displayed.         Cardiac:      Liver and pancreatic function:  Results from last 7 days   Lab Units 07/08/22  0046 07/07/22  0104 07/05/22  0019 07/03/22  1445 07/01/22  1727   ALBUMIN g/dL 2.22* 1.91* 2.47*   < > 3.41*   BILIRUBIN mg/dL 0.2 <0.2 0.2   < > 0.5   ALK PHOS U/L 108 190* 85   < > 82   AST (SGOT) U/L 17 18 16   < > 22   ALT (SGPT) U/L 11 13  12   < > 16   LIPASE U/L  --   --   --   --  12*    < > = values in this interval not displayed.       Medications :     aspirin, 81 mg, Oral, Daily  atorvastatin, 80 mg, Oral, Nightly  bisacodyl, 10 mg, Rectal, Daily  cetirizine, 5 mg, Oral, Daily  docosanol, 1 application, Topical, 5x Daily  docusate sodium, 100 mg, Oral, BID  First Mouthwash (Magic Mouthwash), 10 mL, Swish & Spit, Q6H  FLUoxetine, 40 mg, Oral, Daily  folic acid, 1 mg, Oral, Daily  gabapentin, 300 mg, Oral, Daily  Insulin Aspart, 0-7 Units, Subcutaneous, TID AC  levothyroxine, 25 mcg, Oral, Q AM  metoclopramide, 5 mg, Oral, TID AC  multivitamin, 1 tablet, Oral, Daily  mupirocin, 1 application, Topical, Q24H  pantoprazole, 40 mg, Oral, Q AM  rOPINIRole, 0.5 mg, Oral, BID  sodium chloride, 10 mL, Intravenous, Q12H  sodium chloride, 10 mL, Intravenous, Q12H  sodium chloride, 10 mL, Intravenous, Q12H  sodium chloride, 10 mL, Intravenous, Q12H  sodium chloride, 10 mL, Intravenous, Q12H  vitamin B-1, 100 mg, Oral, Daily      norepinephrine, 0.02-0.3 mcg/kg/min, Last Rate: Stopped (07/08/22 0436)          Assessment & Plan     1.  End-stage renal disease on peritoneal dialysis  2.  Acute CVA left frontal lobe  3.  Severe orthostatic hypotension  4.  Supine hypertension  5.  Type 2 diabetes with neuropathy  6.  Anemia of CKD  7.  Vomiting possible gastroparesis    DC clonidine, start on hydralazine 100mg tid PRN for BP >185mmgh systolic. Pt has very labile BP and will avoid any clonidine until BP is more than 220mmHg systolic   Doing well, and grossly stable from renal stands point,   Continue current [facility] orders      Nicholas Arroyo MD  07/08/22  12:05 EDT

## 2022-07-09 LAB
ANION GAP SERPL CALCULATED.3IONS-SCNC: 9 MMOL/L (ref 5–15)
BASOPHILS # BLD AUTO: 0.03 10*3/MM3 (ref 0–0.2)
BASOPHILS NFR BLD AUTO: 0.4 % (ref 0–1.5)
BUN SERPL-MCNC: 38 MG/DL (ref 8–23)
BUN/CREAT SERPL: 13.8 (ref 7–25)
CALCIUM SPEC-SCNC: 7.8 MG/DL (ref 8.6–10.5)
CHLORIDE SERPL-SCNC: 96 MMOL/L (ref 98–107)
CO2 SERPL-SCNC: 29 MMOL/L (ref 22–29)
CREAT SERPL-MCNC: 2.76 MG/DL (ref 0.57–1)
DEPRECATED RDW RBC AUTO: 45.1 FL (ref 37–54)
EGFRCR SERPLBLD CKD-EPI 2021: 18.6 ML/MIN/1.73
EOSINOPHIL # BLD AUTO: 0.21 10*3/MM3 (ref 0–0.4)
EOSINOPHIL NFR BLD AUTO: 3.1 % (ref 0.3–6.2)
ERYTHROCYTE [DISTWIDTH] IN BLOOD BY AUTOMATED COUNT: 13.9 % (ref 12.3–15.4)
GLUCOSE BLDC GLUCOMTR-MCNC: 200 MG/DL (ref 70–130)
GLUCOSE BLDC GLUCOMTR-MCNC: 274 MG/DL (ref 70–130)
GLUCOSE BLDC GLUCOMTR-MCNC: 333 MG/DL (ref 70–130)
GLUCOSE SERPL-MCNC: 308 MG/DL (ref 65–99)
HCT VFR BLD AUTO: 25.8 % (ref 34–46.6)
HGB BLD-MCNC: 8.3 G/DL (ref 12–15.9)
IMM GRANULOCYTES # BLD AUTO: 0.02 10*3/MM3 (ref 0–0.05)
IMM GRANULOCYTES NFR BLD AUTO: 0.3 % (ref 0–0.5)
LYMPHOCYTES # BLD AUTO: 1.73 10*3/MM3 (ref 0.7–3.1)
LYMPHOCYTES NFR BLD AUTO: 25.4 % (ref 19.6–45.3)
MCH RBC QN AUTO: 28.7 PG (ref 26.6–33)
MCHC RBC AUTO-ENTMCNC: 32.2 G/DL (ref 31.5–35.7)
MCV RBC AUTO: 89.3 FL (ref 79–97)
MONOCYTES # BLD AUTO: 0.58 10*3/MM3 (ref 0.1–0.9)
MONOCYTES NFR BLD AUTO: 8.5 % (ref 5–12)
NEUTROPHILS NFR BLD AUTO: 4.24 10*3/MM3 (ref 1.7–7)
NEUTROPHILS NFR BLD AUTO: 62.3 % (ref 42.7–76)
NRBC BLD AUTO-RTO: 0 /100 WBC (ref 0–0.2)
PLATELET # BLD AUTO: 163 10*3/MM3 (ref 140–450)
PMV BLD AUTO: 11 FL (ref 6–12)
POTASSIUM SERPL-SCNC: 3.7 MMOL/L (ref 3.5–5.2)
RBC # BLD AUTO: 2.89 10*6/MM3 (ref 3.77–5.28)
SODIUM SERPL-SCNC: 134 MMOL/L (ref 136–145)
WBC NRBC COR # BLD: 6.81 10*3/MM3 (ref 3.4–10.8)

## 2022-07-09 PROCEDURE — 80048 BASIC METABOLIC PNL TOTAL CA: CPT | Performed by: INTERNAL MEDICINE

## 2022-07-09 PROCEDURE — 99232 SBSQ HOSP IP/OBS MODERATE 35: CPT | Performed by: INTERNAL MEDICINE

## 2022-07-09 PROCEDURE — 85025 COMPLETE CBC W/AUTO DIFF WBC: CPT | Performed by: INTERNAL MEDICINE

## 2022-07-09 PROCEDURE — 82962 GLUCOSE BLOOD TEST: CPT

## 2022-07-09 PROCEDURE — 63710000001 INSULIN ASPART PER 5 UNITS: Performed by: INTERNAL MEDICINE

## 2022-07-09 RX ADMIN — INSULIN ASPART 3 UNITS: 100 INJECTION, SOLUTION INTRAVENOUS; SUBCUTANEOUS at 17:27

## 2022-07-09 RX ADMIN — HYDROCODONE BITARTRATE AND ACETAMINOPHEN 1 TABLET: 10; 325 TABLET ORAL at 01:32

## 2022-07-09 RX ADMIN — METOCLOPRAMIDE 5 MG: 10 TABLET ORAL at 06:43

## 2022-07-09 RX ADMIN — Medication 10 ML: at 21:02

## 2022-07-09 RX ADMIN — Medication 10 ML: at 08:14

## 2022-07-09 RX ADMIN — Medication 100 MG: at 08:11

## 2022-07-09 RX ADMIN — Medication 10 ML: at 21:03

## 2022-07-09 RX ADMIN — PANTOPRAZOLE SODIUM 40 MG: 40 TABLET, DELAYED RELEASE ORAL at 05:27

## 2022-07-09 RX ADMIN — INSULIN ASPART 5 UNITS: 100 INJECTION, SOLUTION INTRAVENOUS; SUBCUTANEOUS at 08:13

## 2022-07-09 RX ADMIN — HYDROXYZINE HYDROCHLORIDE 25 MG: 25 TABLET ORAL at 05:29

## 2022-07-09 RX ADMIN — CETIRIZINE HYDROCHLORIDE 5 MG: 10 TABLET, FILM COATED ORAL at 08:12

## 2022-07-09 RX ADMIN — Medication 10 ML: at 08:13

## 2022-07-09 RX ADMIN — HYDROCODONE BITARTRATE AND ACETAMINOPHEN 1 TABLET: 10; 325 TABLET ORAL at 23:22

## 2022-07-09 RX ADMIN — ASPIRIN 81 MG: 81 TABLET, COATED ORAL at 08:12

## 2022-07-09 RX ADMIN — METOCLOPRAMIDE 5 MG: 10 TABLET ORAL at 17:27

## 2022-07-09 RX ADMIN — LEVOTHYROXINE SODIUM 25 MCG: 50 TABLET ORAL at 05:27

## 2022-07-09 RX ADMIN — ROPINIROLE HYDROCHLORIDE 0.5 MG: 0.25 TABLET, FILM COATED ORAL at 21:01

## 2022-07-09 RX ADMIN — FLUOXETINE HYDROCHLORIDE 40 MG: 20 CAPSULE ORAL at 08:12

## 2022-07-09 RX ADMIN — Medication 1 TABLET: at 08:12

## 2022-07-09 RX ADMIN — INSULIN ASPART 4 UNITS: 100 INJECTION, SOLUTION INTRAVENOUS; SUBCUTANEOUS at 11:55

## 2022-07-09 RX ADMIN — ATORVASTATIN CALCIUM 80 MG: 40 TABLET, FILM COATED ORAL at 21:01

## 2022-07-09 RX ADMIN — DOCUSATE SODIUM 100 MG: 100 CAPSULE ORAL at 08:12

## 2022-07-09 RX ADMIN — HYDROXYZINE HYDROCHLORIDE 25 MG: 25 TABLET ORAL at 21:01

## 2022-07-09 RX ADMIN — ROPINIROLE HYDROCHLORIDE 0.5 MG: 0.25 TABLET, FILM COATED ORAL at 08:11

## 2022-07-09 RX ADMIN — FOLIC ACID 1 MG: 1 TABLET ORAL at 08:12

## 2022-07-09 RX ADMIN — TIZANIDINE 2 MG: 4 TABLET ORAL at 21:01

## 2022-07-09 RX ADMIN — METOCLOPRAMIDE 5 MG: 10 TABLET ORAL at 11:56

## 2022-07-09 RX ADMIN — HYDROCODONE BITARTRATE AND ACETAMINOPHEN 1 TABLET: 10; 325 TABLET ORAL at 10:03

## 2022-07-09 NOTE — NURSING NOTE
Pt blood pressure was 232/112 gave hydralazine 100 mg as ordered and b/p tanked to 68/48 map 54 so I started levo @ 10:41pm. B/p is now 113/58 map 84 on levo 0.02 will continue to monitor.

## 2022-07-09 NOTE — PLAN OF CARE
Goal Outcome Evaluation:              Outcome Evaluation: VSS at this time. Patient currently resting in bed. Patient ambulates to [facility] with stand-by assist. CVC in place. Continuing to monitor BP closely. No complaints, will continue to monitor.

## 2022-07-09 NOTE — PROGRESS NOTES
Nephrology Progress Note      Subjective     Pt had elevated BP last night was given clonidine that dropped her BP requiring levo.   No chest pain or shortness of breath.     Objective       Vital signs :     Temp:  [97.7 °F (36.5 °C)-99.5 °F (37.5 °C)] 98.2 °F (36.8 °C)  Heart Rate:  [] 98  BP: ()/() 151/74      Intake/Output Summary (Last 24 hours) at 7/9/2022 0757  Last data filed at 7/9/2022 0702  Gross per 24 hour   Intake 240 ml   Output 1015 ml   Net -775 ml       Physical Exam:    General Appearance : not in acute distress  Lungs : clear to auscultation, respirations regular  Heart :  regular rhythm & normal rate, normal S1, S2 and no murmur, no rub  Abdomen : normal bowel sounds, no masses, no hepatomegaly, no splenomegaly, soft non-tender and no guarding  Extremities : no edema, no cyanosis and no redness  Neurologic :   orientated to person, place, time and situation, Grossly no focal deficits      Laboratory Data :     Albumin Albumin   Date Value Ref Range Status   07/08/2022 2.22 (L) 3.50 - 5.20 g/dL Final   07/07/2022 1.91 (L) 3.50 - 5.20 g/dL Final      Magnesium Magnesium   Date Value Ref Range Status   07/07/2022 2.1 1.6 - 2.4 mg/dL Final          PTH               No results found for: PTH    CBC and coagulation:  Results from last 7 days   Lab Units 07/09/22  0100 07/08/22  0046 07/07/22  0104 07/04/22  0035 07/03/22  1727 07/03/22  1445 07/03/22  0022   PROCALCITONIN ng/mL  --   --   --   --   --   --  0.27*   LACTATE mmol/L  --   --   --   --  1.6 2.6*  --    WBC 10*3/mm3 6.81 4.92 5.27   < >  --  7.83 8.28   HEMOGLOBIN g/dL 8.3* 8.3* 8.1*   < >  --  8.5* 9.8*   HEMATOCRIT % 25.8* 25.6* 24.9*   < >  --  26.7* 30.4*   MCV fL 89.3 90.1 88.0   < >  --  90.5 89.9   MCHC g/dL 32.2 32.4 32.5   < >  --  31.8 32.2   PLATELETS 10*3/mm3 163 125* 99*   < >  --  137* 138*    < > = values in this interval not displayed.     Acid/base balance:      Renal and electrolytes:  Results from last  7 days   Lab Units 07/09/22  0100 07/08/22  0046 07/07/22  0104 07/06/22  0143 07/05/22  0019 07/03/22  1445 07/03/22  0022   SODIUM mmol/L 134* 134* 132* 134* 136   < > 141   POTASSIUM mmol/L 3.7 3.5 3.8 3.7 3.7   < > 3.3*   MAGNESIUM mg/dL  --   --  2.1 1.8 1.4*  --   --    CHLORIDE mmol/L 96* 99 98 100 101   < > 101   CO2 mmol/L 29.0 27.3 26.9 27.2 26.5   < > 26.7   BUN mg/dL 38* 41* 48* 41* 40*   < > 48*   CREATININE mg/dL 2.76* 2.67* 2.83* 2.93* 3.12*   < > 3.82*   CALCIUM mg/dL 7.8* 7.5* 7.2* 7.1* 7.8*   < > 8.1*   PHOSPHORUS mg/dL  --   --   --   --  2.7  --  4.7*    < > = values in this interval not displayed.     Estimated Creatinine Clearance: 15.1 mL/min (A) (by C-G formula based on SCr of 2.76 mg/dL (H)).    Liver and pancreatic function:  Results from last 7 days   Lab Units 07/08/22  0046 07/07/22  0104 07/05/22  0019   ALBUMIN g/dL 2.22* 1.91* 2.47*   BILIRUBIN mg/dL 0.2 <0.2 0.2   ALK PHOS U/L 108 190* 85   AST (SGOT) U/L 17 18 16   ALT (SGPT) U/L 11 13 12         Cardiac:      Liver and pancreatic function:  Results from last 7 days   Lab Units 07/08/22  0046 07/07/22  0104 07/05/22  0019   ALBUMIN g/dL 2.22* 1.91* 2.47*   BILIRUBIN mg/dL 0.2 <0.2 0.2   ALK PHOS U/L 108 190* 85   AST (SGOT) U/L 17 18 16   ALT (SGPT) U/L 11 13 12       Medications :     aspirin, 81 mg, Oral, Daily  atorvastatin, 80 mg, Oral, Nightly  bisacodyl, 10 mg, Rectal, Daily  cetirizine, 5 mg, Oral, Daily  docosanol, 1 application, Topical, 5x Daily  docusate sodium, 100 mg, Oral, BID  First Mouthwash (Magic Mouthwash), 10 mL, Swish & Spit, Q6H  FLUoxetine, 40 mg, Oral, Daily  folic acid, 1 mg, Oral, Daily  gabapentin, 300 mg, Oral, Daily  Insulin Aspart, 0-7 Units, Subcutaneous, TID AC  levothyroxine, 25 mcg, Oral, Q AM  metoclopramide, 5 mg, Oral, TID AC  multivitamin, 1 tablet, Oral, Daily  mupirocin, 1 application, Topical, Q24H  pantoprazole, 40 mg, Oral, Q AM  rOPINIRole, 0.5 mg, Oral, BID  sodium chloride, 10 mL,  Intravenous, Q12H  sodium chloride, 10 mL, Intravenous, Q12H  sodium chloride, 10 mL, Intravenous, Q12H  sodium chloride, 10 mL, Intravenous, Q12H  sodium chloride, 10 mL, Intravenous, Q12H  vitamin B-1, 100 mg, Oral, Daily      norepinephrine, 0.02-0.3 mcg/kg/min, Last Rate: Stopped (07/09/22 0129)          Assessment & Plan     1.  End-stage renal disease on peritoneal dialysis  2.  Acute CVA left frontal lobe  3.  Severe orthostatic hypotension  4.  Supine hypertension  5.  Type 2 diabetes with neuropathy  6.  Anemia of CKD  7.  Vomiting possible gastroparesis    DC clonidine, start on hydralazine 100mg tid PRN for BP >185mmgh systolic. Pt has very labile BP and will avoid any clonidine until BP is more than 220mmHg systolic   Doing well, and grossly stable from renal stands point,   Continue current [facility] orders      Nicholas Arroyo MD  07/09/22  07:57 EDT

## 2022-07-09 NOTE — PROGRESS NOTES
[facility] HOSPITALIST PROGRESS NOTE    Subjective     History:   Reny Elena is a 62 y.o. female admitted on 7/1/2022 secondary to <principal problem not specified>     Procedures:   7/2/22: Right IJ central line placement    CC: Follow up CVA    Patient seen and examined. Awake and alert. States she feels overall improved with improving nausea and appetite. No reported vomiting. BP continues to fluctuate and again briefly required vasopressor support overnight.     History taken from: patient, chart, and RN.      Objective     Vital Signs  Temp:  [97.7 °F (36.5 °C)-98.7 °F (37.1 °C)] 98.7 °F (37.1 °C)  Heart Rate:  [] 87  BP: ()/() 162/84    Intake/Output Summary (Last 24 hours) at 7/9/2022 1620  Last data filed at 7/9/2022 1200  Gross per 24 hour   Intake 720 ml   Output 1015 ml   Net -295 ml         Physical Exam: Unchanged from previous.   General:    Awake, alert, in no acute distress, chronically ill appearing    Heart:      Normal S1 and S2. Regular rate and rhythm. No significant murmur, rubs or gallops appreciated.   Lungs:     Respirations regular, even and unlabored. Lungs clear to auscultation B/L. No wheezes, rales or rhonchi.   Abdomen:   Soft and nontender. No guarding, rebound tenderness or  organomegaly noted. Bowel sounds present x 4.   Extremities:  No clubbing, cyanosis or edema noted. Moves UE and LE equally B/L with generalized weakness.      Results Review:    Results from last 7 days   Lab Units 07/09/22  0100 07/08/22  0046 07/07/22  0104 07/06/22  0143 07/05/22  0019 07/04/22  0035 07/03/22  1445   WBC 10*3/mm3 6.81 4.92 5.27 4.04 6.77 7.10 7.83   HEMOGLOBIN g/dL 8.3* 8.3* 8.1* 7.4* 8.7* 8.8* 8.5*   PLATELETS 10*3/mm3 163 125* 99* 88* 109* 129* 137*     Results from last 7 days   Lab Units 07/09/22  0100 07/08/22  0046 07/07/22  0104 07/06/22  0143 07/05/22  0019 07/04/22  0035 07/03/22  1445   SODIUM mmol/L 134* 134* 132* 134* 136 135* 134*    POTASSIUM mmol/L 3.7 3.5 3.8 3.7 3.7 2.9* 3.5   CHLORIDE mmol/L 96* 99 98 100 101 99 98   CO2 mmol/L 29.0 27.3 26.9 27.2 26.5 26.7 27.2   BUN mg/dL 38* 41* 48* 41* 40* 42* 43*   CREATININE mg/dL 2.76* 2.67* 2.83* 2.93* 3.12* 3.31* 3.40*   CALCIUM mg/dL 7.8* 7.5* 7.2* 7.1* 7.8* 7.9* 7.8*   GLUCOSE mg/dL 308* 248* 425* 407* 194* 281* 233*     Results from last 7 days   Lab Units 07/08/22  0046 07/07/22  0104 07/05/22  0019 07/03/22  1445   BILIRUBIN mg/dL 0.2 <0.2 0.2 0.3   ALK PHOS U/L 108 190* 85 63   AST (SGOT) U/L 17 18 16 16   ALT (SGPT) U/L 11 13 12 11     Results from last 7 days   Lab Units 07/07/22  0104 07/06/22  0143 07/05/22  0019   MAGNESIUM mg/dL 2.1 1.8 1.4*               Imaging Results (Last 24 Hours)     ** No results found for the last 24 hours. **            Medications:  aspirin, 81 mg, Oral, Daily  atorvastatin, 80 mg, Oral, Nightly  bisacodyl, 10 mg, Rectal, Daily  cetirizine, 5 mg, Oral, Daily  docosanol, 1 application, Topical, 5x Daily  docusate sodium, 100 mg, Oral, BID  First Mouthwash (Magic Mouthwash), 10 mL, Swish & Spit, Q6H  FLUoxetine, 40 mg, Oral, Daily  folic acid, 1 mg, Oral, Daily  gabapentin, 300 mg, Oral, Daily  Insulin Aspart, 0-7 Units, Subcutaneous, TID AC  levothyroxine, 25 mcg, Oral, Q AM  metoclopramide, 5 mg, Oral, TID AC  multivitamin, 1 tablet, Oral, Daily  mupirocin, 1 application, Topical, Q24H  pantoprazole, 40 mg, Oral, Q AM  rOPINIRole, 0.5 mg, Oral, BID  sodium chloride, 10 mL, Intravenous, Q12H  sodium chloride, 10 mL, Intravenous, Q12H  sodium chloride, 10 mL, Intravenous, Q12H  sodium chloride, 10 mL, Intravenous, Q12H  sodium chloride, 10 mL, Intravenous, Q12H  vitamin B-1, 100 mg, Oral, Daily      norepinephrine, 0.02-0.3 mcg/kg/min, Last Rate: Stopped (07/09/22 0129)            Assessment & Plan    Acute to subacute ischemic strokes: Concern for possible cardioembolic etiology vs small vessel. CT head reveals atrophy and chronic microangiopathic change.  MRI reveals a few small patchy foci of acute vs early subacute infarct involving the left frontal and left cerebellar hemisphere with extensive presumed chronic microvascular ischemic changes with multiple old lacunar infarcts. No significant stenosis noted on MRA. Echo reveals an EF of 51-55%, grade I diastolic dysfunction, trace AR and mild AS with negative saline test. Cont ASA and statin. PT/OT and SLP. Will need outpatient event monitor. Teleneurology has signed off with input appreciated.     Hypertensive urgency: Improved upon admission and weaned off Cardizem gtt. Received home Coreg and started on Procardia with subsequent hypotension requiring vasopressor support. Concern for severe orthostatic hypotension with supine HTN. Holding scheduled antihypertensives. Recurrent episodes of hypotension noted following doses of PRN Clonidine requiring vasopressor support briefly. Discussed with nephrology who added PRN hydralazine for SBP>185. Changed PRN Clonidine to PRN for SBP>220 per nephrology recs. In the setting of widely fluctuating BP's will need to allow permissive HTN to avoid hypotension requiring vasopressors. Cont to monitor BP closely. Nephrology input appreciated.     Nausea and vomiting with recent weight loss: Concern for possible gastroparesis. No acute findings on CT. Neurology recommended LP with concern for possible hypercoagulability/underlying malignancy in the setting of CVA's. However, pt has respectfully declined LP and expressed understanding of outlined concerns. No obvious malignancy on imaging at present. Obtained gastric emptying study which is normal. Obtained SBFT which reveals esophageal tertiary contractions with mild esophageal dysmotility and reflux to level of mid thoracic esophagus with unremarkable small bowel follow-through. Cont a trial of Reglan as she reports improvement in her symptoms following initiation of Rx. Cont supportive treatment.     Recent traumatic subdural  hematoma: No evidence of subdural hematoma on imaging. Cont supportive treatment with close monitoring.     Hypokalemia: K+ improved and stable today. Mg improved. PD per nephrology. Cont to monitor.    Ongoing vision changes: Pt reports ongoing vision changes for the last month. Imaging as above. Ophthalmology consulted who noted concern for PCO in the left eye and recs for dilated exam and possible YAG capsulotomy in their office after discharge. Ophthalmology input appreciated.     ESRD on PD: Cont PD per nephrology with input appreciated.     DM II, non-insulin dependent with neuropathy: HgbA1c 7.6. Episode of hypoglycemia on 7/3 with no further episodes reported. BG continues to fluctuate. Cont Accuchecks.     Chronic diastolic CHF: Echo as above. Appears compensated at present. PD per nephrology. Monitor volume status.     Chronic normocytic anemia: Possibly anemia of CKD. No obvious signs of bleeding. Cont to monitor.     Hyperlipidemia: Cont statin.     Hypothyroidism: TSH is elevated with normal free T4. Cont levothyroxine.     PAD: Cont ASA and statin.     DVT PPX: SCD's    Pt is at high risk 2/2 acute CVA, hypertensive urgency, fluctuating BP, DM with hypoglycemia, electrolyte abnormalities, recent subdural hematoma, CHF, ESRD on PD, unintentional weight loss.     Disposition: Pt now unsure if she wants to go to inpatient rehab.         Mark Crawford DO  07/09/22  16:20 EDT

## 2022-07-09 NOTE — PLAN OF CARE
Goal Outcome Evaluation:  Plan of Care Reviewed With: patient        Progress: no change  Outcome Evaluation: Pt a&o x4. RA. NS.  Reg CCC diet.  Hydralazine given for b/p and it tanked her b/p and levo was started and stopped.  Pt is getting d/c to inpt rehab.  No complaints at this time.  Pt resting in bed with bed low, lock, and alarmed.

## 2022-07-10 ENCOUNTER — HOSPITAL ENCOUNTER (INPATIENT)
Dept: RESPIRATORY THERAPY | Facility: HOSPITAL | Age: 62
Discharge: HOME | End: 2022-07-10

## 2022-07-10 ENCOUNTER — TRANSCRIBE ORDERS (OUTPATIENT)
Dept: ADMINISTRATIVE | Facility: HOSPITAL | Age: 62
End: 2022-07-10

## 2022-07-10 ENCOUNTER — READMISSION MANAGEMENT (OUTPATIENT)
Dept: CALL CENTER | Facility: HOSPITAL | Age: 62
End: 2022-07-10

## 2022-07-10 VITALS
BODY MASS INDEX: 17.63 KG/M2 | DIASTOLIC BLOOD PRESSURE: 65 MMHG | TEMPERATURE: 98.2 F | SYSTOLIC BLOOD PRESSURE: 158 MMHG | HEIGHT: 65 IN | HEART RATE: 92 BPM | WEIGHT: 105.8 LBS | OXYGEN SATURATION: 97 % | RESPIRATION RATE: 9 BRPM

## 2022-07-10 DIAGNOSIS — I63.9 CEREBROVASCULAR ACCIDENT (CVA), UNSPECIFIED MECHANISM: ICD-10-CM

## 2022-07-10 DIAGNOSIS — I63.9 STROKE DETERMINED BY CLINICAL ASSESSMENT: Primary | ICD-10-CM

## 2022-07-10 LAB
ALBUMIN SERPL-MCNC: 2.21 G/DL (ref 3.5–5.2)
ALBUMIN/GLOB SERPL: 1 G/DL
ALP SERPL-CCNC: 134 U/L (ref 39–117)
ALT SERPL W P-5'-P-CCNC: 17 U/L (ref 1–33)
ANION GAP SERPL CALCULATED.3IONS-SCNC: 7.2 MMOL/L (ref 5–15)
AST SERPL-CCNC: 26 U/L (ref 1–32)
BASOPHILS # BLD AUTO: 0.03 10*3/MM3 (ref 0–0.2)
BASOPHILS NFR BLD AUTO: 0.6 % (ref 0–1.5)
BILIRUB SERPL-MCNC: <0.2 MG/DL (ref 0–1.2)
BUN SERPL-MCNC: 40 MG/DL (ref 8–23)
BUN/CREAT SERPL: 13.7 (ref 7–25)
CALCIUM SPEC-SCNC: 7.5 MG/DL (ref 8.6–10.5)
CHLORIDE SERPL-SCNC: 95 MMOL/L (ref 98–107)
CO2 SERPL-SCNC: 28.8 MMOL/L (ref 22–29)
CREAT SERPL-MCNC: 2.92 MG/DL (ref 0.57–1)
DEPRECATED RDW RBC AUTO: 45.6 FL (ref 37–54)
EGFRCR SERPLBLD CKD-EPI 2021: 17.4 ML/MIN/1.73
EOSINOPHIL # BLD AUTO: 0.14 10*3/MM3 (ref 0–0.4)
EOSINOPHIL NFR BLD AUTO: 2.9 % (ref 0.3–6.2)
ERYTHROCYTE [DISTWIDTH] IN BLOOD BY AUTOMATED COUNT: 13.9 % (ref 12.3–15.4)
GLOBULIN UR ELPH-MCNC: 2.2 GM/DL
GLUCOSE BLDC GLUCOMTR-MCNC: 385 MG/DL (ref 70–130)
GLUCOSE SERPL-MCNC: 463 MG/DL (ref 65–99)
HCT VFR BLD AUTO: 23.3 % (ref 34–46.6)
HGB BLD-MCNC: 7.4 G/DL (ref 12–15.9)
IMM GRANULOCYTES # BLD AUTO: 0.02 10*3/MM3 (ref 0–0.05)
IMM GRANULOCYTES NFR BLD AUTO: 0.4 % (ref 0–0.5)
LYMPHOCYTES # BLD AUTO: 1.38 10*3/MM3 (ref 0.7–3.1)
LYMPHOCYTES NFR BLD AUTO: 29 % (ref 19.6–45.3)
MCH RBC QN AUTO: 28.7 PG (ref 26.6–33)
MCHC RBC AUTO-ENTMCNC: 31.8 G/DL (ref 31.5–35.7)
MCV RBC AUTO: 90.3 FL (ref 79–97)
MONOCYTES # BLD AUTO: 0.46 10*3/MM3 (ref 0.1–0.9)
MONOCYTES NFR BLD AUTO: 9.7 % (ref 5–12)
NEUTROPHILS NFR BLD AUTO: 2.73 10*3/MM3 (ref 1.7–7)
NEUTROPHILS NFR BLD AUTO: 57.4 % (ref 42.7–76)
NRBC BLD AUTO-RTO: 0 /100 WBC (ref 0–0.2)
PLATELET # BLD AUTO: 148 10*3/MM3 (ref 140–450)
PMV BLD AUTO: 10.2 FL (ref 6–12)
POTASSIUM SERPL-SCNC: 4 MMOL/L (ref 3.5–5.2)
PROT SERPL-MCNC: 4.4 G/DL (ref 6–8.5)
QT INTERVAL: 418 MS
QTC INTERVAL: 519 MS
RBC # BLD AUTO: 2.58 10*6/MM3 (ref 3.77–5.28)
SODIUM SERPL-SCNC: 131 MMOL/L (ref 136–145)
WBC NRBC COR # BLD: 4.76 10*3/MM3 (ref 3.4–10.8)

## 2022-07-10 PROCEDURE — 63710000001 INSULIN ASPART PER 5 UNITS: Performed by: HOSPITALIST

## 2022-07-10 PROCEDURE — 82962 GLUCOSE BLOOD TEST: CPT

## 2022-07-10 PROCEDURE — 93246 EXT ECG>7D<15D RECORDING: CPT

## 2022-07-10 PROCEDURE — 99239 HOSP IP/OBS DSCHRG MGMT >30: CPT | Performed by: INTERNAL MEDICINE

## 2022-07-10 PROCEDURE — 85025 COMPLETE CBC W/AUTO DIFF WBC: CPT | Performed by: INTERNAL MEDICINE

## 2022-07-10 PROCEDURE — 80053 COMPREHEN METABOLIC PANEL: CPT | Performed by: INTERNAL MEDICINE

## 2022-07-10 PROCEDURE — 93010 ELECTROCARDIOGRAM REPORT: CPT | Performed by: SPECIALIST

## 2022-07-10 PROCEDURE — 93005 ELECTROCARDIOGRAM TRACING: CPT | Performed by: INTERNAL MEDICINE

## 2022-07-10 PROCEDURE — 63710000001 INSULIN ASPART PER 5 UNITS: Performed by: INTERNAL MEDICINE

## 2022-07-10 RX ORDER — FOLIC ACID 1 MG/1
1 TABLET ORAL DAILY
Qty: 30 TABLET | Refills: 0 | Status: ON HOLD | OUTPATIENT
Start: 2022-07-10 | End: 2022-11-21

## 2022-07-10 RX ORDER — ASPIRIN 81 MG/1
81 TABLET ORAL DAILY
Qty: 30 TABLET | Refills: 0 | Status: SHIPPED | OUTPATIENT
Start: 2022-07-10

## 2022-07-10 RX ORDER — METHION/INOS/CHOL BT/B COM/LIV 110MG-86MG
100 CAPSULE ORAL DAILY
Qty: 30 TABLET | Refills: 0 | Status: ON HOLD | OUTPATIENT
Start: 2022-07-10 | End: 2022-11-21

## 2022-07-10 RX ORDER — ATORVASTATIN CALCIUM 80 MG/1
80 TABLET, FILM COATED ORAL NIGHTLY
Qty: 30 TABLET | Refills: 0 | Status: SHIPPED | OUTPATIENT
Start: 2022-07-10

## 2022-07-10 RX ORDER — METOCLOPRAMIDE 5 MG/1
5 TABLET ORAL
Qty: 90 TABLET | Refills: 0 | Status: SHIPPED | OUTPATIENT
Start: 2022-07-10

## 2022-07-10 RX ORDER — CLONIDINE HYDROCHLORIDE 0.1 MG/1
0.1 TABLET ORAL 3 TIMES DAILY PRN
Qty: 90 TABLET | Refills: 0 | Status: ON HOLD | OUTPATIENT
Start: 2022-07-10 | End: 2022-11-21

## 2022-07-10 RX ORDER — HYDRALAZINE HYDROCHLORIDE 100 MG/1
100 TABLET, FILM COATED ORAL 3 TIMES DAILY PRN
Qty: 90 TABLET | Refills: 0 | Status: ON HOLD | OUTPATIENT
Start: 2022-07-10 | End: 2022-11-24 | Stop reason: SDUPTHER

## 2022-07-10 RX ORDER — DIPHENOXYLATE HYDROCHLORIDE AND ATROPINE SULFATE 2.5; .025 MG/1; MG/1
1 TABLET ORAL DAILY
Qty: 30 TABLET | Refills: 0 | Status: SHIPPED | OUTPATIENT
Start: 2022-07-10

## 2022-07-10 RX ORDER — INSULIN ASPART 100 [IU]/ML
7 INJECTION, SOLUTION INTRAVENOUS; SUBCUTANEOUS ONCE
Status: COMPLETED | OUTPATIENT
Start: 2022-07-10 | End: 2022-07-10

## 2022-07-10 RX ORDER — DOCUSATE SODIUM 100 MG/1
100 CAPSULE, LIQUID FILLED ORAL 2 TIMES DAILY
Qty: 60 CAPSULE | Refills: 0 | Status: ON HOLD | OUTPATIENT
Start: 2022-07-10 | End: 2022-11-21

## 2022-07-10 RX ADMIN — METOCLOPRAMIDE 5 MG: 10 TABLET ORAL at 08:47

## 2022-07-10 RX ADMIN — Medication 1 TABLET: at 08:46

## 2022-07-10 RX ADMIN — Medication 10 ML: at 08:49

## 2022-07-10 RX ADMIN — Medication 10 ML: at 08:50

## 2022-07-10 RX ADMIN — Medication 100 MG: at 08:47

## 2022-07-10 RX ADMIN — METOCLOPRAMIDE 5 MG: 10 TABLET ORAL at 06:13

## 2022-07-10 RX ADMIN — PANTOPRAZOLE SODIUM 40 MG: 40 TABLET, DELAYED RELEASE ORAL at 06:13

## 2022-07-10 RX ADMIN — INSULIN ASPART 6 UNITS: 100 INJECTION, SOLUTION INTRAVENOUS; SUBCUTANEOUS at 08:45

## 2022-07-10 RX ADMIN — FOLIC ACID 1 MG: 1 TABLET ORAL at 08:47

## 2022-07-10 RX ADMIN — CETIRIZINE HYDROCHLORIDE 5 MG: 10 TABLET, FILM COATED ORAL at 08:46

## 2022-07-10 RX ADMIN — ROPINIROLE HYDROCHLORIDE 0.5 MG: 0.25 TABLET, FILM COATED ORAL at 08:46

## 2022-07-10 RX ADMIN — ASPIRIN 81 MG: 81 TABLET, COATED ORAL at 08:45

## 2022-07-10 RX ADMIN — HYDROXYZINE HYDROCHLORIDE 25 MG: 25 TABLET ORAL at 06:17

## 2022-07-10 RX ADMIN — GABAPENTIN 300 MG: 300 CAPSULE ORAL at 08:49

## 2022-07-10 RX ADMIN — INSULIN ASPART 7 UNITS: 100 INJECTION, SOLUTION INTRAVENOUS; SUBCUTANEOUS at 03:08

## 2022-07-10 RX ADMIN — FLUOXETINE HYDROCHLORIDE 40 MG: 20 CAPSULE ORAL at 08:45

## 2022-07-10 RX ADMIN — LEVOTHYROXINE SODIUM 25 MCG: 50 TABLET ORAL at 06:13

## 2022-07-10 NOTE — PLAN OF CARE
Goal Outcome Evaluation:  Plan of Care Reviewed With: patient        Progress: no change  Outcome Evaluation: A&o x4. RA. NIH stroke scale per shift. NS.  Reg consistent carb cardiac diet.  PD to RLQ.  Ambulates to [facility]. B/P continues to be elevated.  BS elevated to 463 and 7 units given. Pt resting in bed with call light in hand.  No complaints as of now.

## 2022-07-10 NOTE — NURSING NOTE
Contacted [facility]. Spoke with Randell. Faxed face sheet, home health order, H&P, OT notes and PT notes to [phone]. Still need discharge summary sent when completed.

## 2022-07-10 NOTE — DISCHARGE SUMMARY
[facility] DISCHARGE SUMMARY      Date of Admission: 7/1/2022    Date of Discharge: 7/10/2022     PCP: Susan Stephens APRN    Admission Diagnosis:   Please see admission H&P    Discharge Diagnosis:   Acute to subacute ischemic strokes  Hypertensive urgency  Autonomic insufficiency with supine HTN and severe orthostatic hypotension    Nausea and vomiting with recent weight loss  Recent traumatic subdural hematoma  Hypokalemia  Hypomagnesemia  Ongoing vision changes  ESRD on PD  DM II, non-insulin dependent with neuropathy  Chronic diastolic CHF  Chronic normocytic anemia  Hyperlipidemia  Hypothyroidism  PAD  Generalized weakness    Procedures Performed:  7/2/22: Right IJ central line placement     Consults:   Consults     Date and Time Order Name Status Description    7/5/2022  6:43 AM Inpatient Ophthalmology Consult      7/2/2022  1:42 AM Inpatient Nephrology Consult Completed     7/2/2022  1:42 AM Inpatient Neurology Consult Stroke              History of Present Illness:  Reny Elena is a 62 y.o. female who presented to [facility] ED with CC of persistent nausea and vomiting. Please see admission H&P for complete details.     In the ED, she was hypertensive with SBP in the 200's. Initial troponin T was 0.085 with repeat at 0.081.  ABG with pH 7.460, PCO2 39.6, PO2 75.3, HCO3 28.1, and oxygen saturation 96.4% on room air. CMP with glucose 206, creatinine 3.38, BUN 37, EGFR 14.6, and albumin 3.41.  Blood acetone was negative.  Lipase was negative. CBC with hemoglobin 11.8, platelets 131,000, otherwise grossly unremarkable.  COVID-19 and influenza screening were negative.  Chest x-ray did not reveal any evidence of acute cardiopulmonary disease.  CT abdomen pelvis without contrast revealed small volume ascites or dialysate with percutaneous dialysis catheter in place. There was no evidence of obstruction.  CT head without contrast revealed atrophy and chronic microangiopathic changes with no parenchymal  mass/hemorrhage/midline shift appreciated.  MRI brain without contrast with a few small patchy foci of acute versus early subacute infarct involving the left frontal lobe and left cerebral hemisphere.  Previously seen subdural hematomas no longer visualized.  There is extensive presumed chronic microvascular ischemic changes with multiple old lacunar infarcts noted.She was given IV hydralazine x 2 in addition to IV Compazine x 2. Teleneurology was contacted in the ED.        Hospital Course  Reny Elena was admitted to the PCU for further evaluation and treatment. She was continued on high dose statin. ASA was initially held in the setting of her recent hx of subdural hematoma. Teleneurology was consulted for further input. PT/OT and SLP were consulted. Nephrology was consulted for management of her ESRD on PD.    Neurology voiced concern for possible cardioembolic etiology vs small vessel etiology of her ischemic strokes. No significant stenosis was noted on MRA. Echo revealed an EF of 51-55%, grade I diastolic dysfunction, trace AR and mild AS with negative saline test. Low dose ASA was later initiated in addition to high dose statin and she tolerated it well. Teleneurology recommended an outpatient event monitor. Her BP remained elevated upon admission with permissive HTN initially allowed. However, her BP still remained elevated and a Cardizem gtt was initiated with subsequent improvement.     Once weaned off the Cardizem gtt her home Coreg was resumed with initiation of Procardia. However, her BP subsequently dropped significantly requiring placement of a central line and initiation of vasopressor support. Her BP quickly improved and she was weaned off vasopressor support. Nephrology is very familiar with Ms. Elena and noted hx of similar episodes with dialysis. Concern for autonomic insufficiency was noted with supine HTN and severe orthostatic hypotension. All scheduled antihypertensives were stopped. She  was initially placed on PRN Clonidine with orders for Rx to be given for SBP>190. However, her BP continued to fluctuate requiring PRN Clonidine with subsequent hypotension briefly requiring vasopressor support. Nephrology later added PRN hydralazine for SBP>185 and changed PRN Clonidine to PRN for SBP>220. In the setting of widely fluctuating BP's will need to allow permissive HTN to avoid hypotension. With these changes her BP did remain overall more stable. She reported ongoing vision changes over the last several weeks. Ophthalmology was consulted who noted concern for PCO in the left eye with recs for dilated exam and possible YAG capsulotomy in their office after discharge.      In the setting of her nausea and vomiting with recent weight loss neurology recommended an LP with concern for possible hypercoagulability/underlying malignancy in the setting of CVA's. However, Ms. Elena respectfully declined LP and expressed understanding of the outlined concerns. No obvious malignancy was noted on imaging at present. In the setting of suspected gastroparesis a gastric emptying study was obtained which was normal. SBFT revealed esophageal tertiary contractions with mild esophageal dysmotility and reflux to level of mid thoracic esophagus with unremarkable small bowel follow-through. She was continued on PPI therapy. A trial of Reglan was initiated and she reported improvement in her symptoms. No further episodes of vomiting were reported with improvement in her nausea and PO intake. She experienced some constipation which was addressed during her hospitalization.     SS was consulted to assist with discharge planning. She worked with PT/OT and initially expressed interest in short term rehab. An inpatient rehab referral was placed and submitted to her insurance for approval. Approval was obtained with placement initially delayed 2/2 her labile BP. Once her BP and nausea improved she felt much improved and ultimately  decided that she preferred to return home with home health rather than proceed with rehab placement. Inpatient rehab was encouraged but she politely preferred to return home at discharge.     Ms. Elena was seen and examined with GLORIA Savage on 7/10/22. She remained afebrile and hemodynamically stable with stable AM labs. She felt much improved from upon admission and was eager to go home. She was deemed medically stable for discharge. Home health was ordered at discharge. Orders for an event monitor were placed as well. Instructions were given for follow up with her PCP, neurology and ophthalmology.     Condition on Discharge:  Stable    Vital Signs  Vitals:    07/10/22 0803   BP: 150/86   Pulse: 103   Resp: 16   Temp: 98.2 °F (36.8 °C)   SpO2: 91%       Physical Exam:  General:    Awake, alert, in no acute distress, chronically ill appearing   Heart:      Normal S1 and S2. Regular rate and rhythm. No significant murmur, rubs or gallops appreciated.   Lungs:     Respirations regular, even and unlabored. Lungs clear to auscultation B/L. No wheezes, rales or rhonchi.   Abdomen:   Soft and nontender. No guarding, rebound tenderness or  organomegaly noted. Bowel sounds present x 4.   Extremities:  No clubbing, cyanosis or edema noted. Moves UE and LE equally B/L with generalized weakness.     Discharge Disposition:   home      Discharge Medications:     Discharge Medications      New Medications      Instructions Start Date   aspirin 81 MG EC tablet   81 mg, Oral, Daily      atorvastatin 80 MG tablet  Commonly known as: LIPITOR   80 mg, Oral, Nightly      folic acid 1 MG tablet  Commonly known as: FOLVITE   1 mg, Oral, Daily      metoclopramide 5 MG tablet  Commonly known as: REGLAN   5 mg, Oral, 3 Times Daily Before Meals      multivitamin tablet tablet   1 tablet, Oral, Daily      thiamine 100 MG tablet tablet  Commonly known as: VITAMIN B-1   100 mg, Oral, Daily         Changes to Medications      Instructions Start  Date   cloNIDine 0.1 MG tablet  Commonly known as: CATAPRES  What changed: reasons to take this   0.1 mg, Oral, 3 Times Daily PRN      docusate sodium 100 MG capsule  Commonly known as: COLACE  What changed:   · when to take this  · reasons to take this   100 mg, Oral, 2 Times Daily      hydrALAZINE 100 MG tablet  Commonly known as: APRESOLINE  What changed:   · medication strength  · how much to take  · when to take this  · reasons to take this   100 mg, Oral, 3 Times Daily PRN         Continue These Medications      Instructions Start Date   FLUoxetine 20 MG capsule  Commonly known as: PROzac   40 mg, Oral, Daily      fluticasone 50 MCG/ACT nasal spray  Commonly known as: FLONASE   2 sprays, Nasal, Daily PRN      gabapentin 300 MG capsule  Commonly known as: NEURONTIN   300 mg, Oral, Daily      HYDROcodone-acetaminophen  MG per tablet  Commonly known as: NORCO   1 tablet, Oral, 3 Times Daily PRN      hydrOXYzine 25 MG tablet  Commonly known as: ATARAX   25 mg, Oral, 3 Times Daily PRN      insulin aspart 100 UNIT/ML injection  Commonly known as: novoLOG   2-4 Units, Subcutaneous, 3 Times Daily Before Meals      levothyroxine 25 MCG tablet  Commonly known as: SYNTHROID, LEVOTHROID   25 mcg, Oral, Daily      loratadine 10 MG tablet  Commonly known as: CLARITIN   10 mg, Oral, Daily      ondansetron ODT 4 MG disintegrating tablet  Commonly known as: ZOFRAN-ODT   4 mg, Translingual, Daily PRN      pantoprazole 40 MG EC tablet  Commonly known as: PROTONIX   40 mg, Oral, Daily      rOPINIRole 0.5 MG tablet  Commonly known as: REQUIP   0.5 mg, Oral, 2 Times Daily      tiZANidine 4 MG tablet  Commonly known as: ZANAFLEX   4 mg, Oral, Every 6 Hours PRN         Stop These Medications    carvedilol 12.5 MG tablet  Commonly known as: COREG     clopidogrel 75 MG tablet  Commonly known as: PLAVIX     isosorbide mononitrate 30 MG 24 hr tablet  Commonly known as: IMDUR     lisinopril 20 MG tablet  Commonly known as:  PRINIVIL,ZESTRIL     potassium chloride 20 MEQ CR tablet  Commonly known as: K-DUR,KLOR-CON     pravastatin 20 MG tablet  Commonly known as: PRAVACHOL     traZODone 100 MG tablet  Commonly known as: DESYREL              Discharge Diet:   Dietary Orders (From admission, onward)     Start     Ordered    07/08/22 1106  Diet Regular; Cardiac, Consistent Carbohydrate  Diet Effective Now        Question Answer Comment   Diet Texture / Consistency Regular    Common Modifiers Cardiac    Common Modifiers Consistent Carbohydrate        07/08/22 1105    07/04/22 1200  Dietary Nutrition Supplements Novasource Renal (Nepro)  Daily With Lunch & Dinner      Question:  Select Supplement  Answer:  Novasource Renal (Nepro)    07/04/22 1018                Activity at Discharge:  activity as tolerated    Follow-up Appointments:  Additional Instructions for the Follow-ups that You Need to Schedule     Ambulatory Referral to Home Health   As directed      Face to Face Visit Date: 7/10/2022    Follow-up provider for Plan of Care?: I treated the patient in an acute care facility and will not continue treatment after discharge.    Follow-up provider: SUSAN JETT [[id number]]    Reason/Clinical Findings: Acute and subacute CVA's    Describe mobility limitations that make leaving home difficult: Acute and subacute CVA's, debility    Nursing/Therapeutic Services Requested: Skilled Nursing Physical Therapy Occupational Therapy    Skilled nursing orders: Cardiopulmonary assessments Medication education Neurovascular assessments    Frequency: 1 Week 1         Discharge Follow-up with PCP   As directed       Currently Documented PCP:    Susan Jett APRN    PCP Phone Number:    [phone]     Follow Up Details: Follow up with DARIUS Jimenez in 1 week.         Discharge Follow-up with Specified Provider: Follow up with neurology in 2-4 weeks.   As directed      To: Follow up with neurology in 2-4 weeks.         Discharge Follow-up  with Specified Provider: Follow up with ophthalmology (Dr. Lynn) in 1 week.   As directed      To: Follow up with ophthalmology (Dr. Lynn) in 1 week.            Contact information for follow-up providers     Susan Stephens APRN .    Specialty: Nurse Practitioner  Why: Follow up with DARIUS Jimenez in 1 week.  Contact information:  [address] [facility] [address]  [phone]                   Contact information for after-discharge care     Destination     [facility] .    Service: Inpatient Rehabilitation  Contact information:  [address] [facility] [address] [phone]                                 Test Results Pending at Discharge:       The ASCVD Risk score ([place] LORA Jr., et al., 2013) failed to calculate for the following reasons:    The patient has a prior MI or stroke diagnosis      Mark Crawford DO  07/10/22  08:47 EDT      Time: Greater than 30 minutes spent on this discharge.

## 2022-07-11 NOTE — OUTREACH NOTE
Prep Survey    Flowsheet Row Responses   [facility] patient discharged from? [place]   Is LACE score < 7 ? No   Emergency Room discharge w/ pulse ox? No   Eligibility Not Eligible   What are the reasons patient is not eligible? Subacute Care Center  [[facility] ]   Does the patient have one of the following disease processes/diagnoses(primary or secondary)? Stroke (TIA)   Prep survey completed? Yes          NATALIA JO - Registered Nurse

## 2022-07-12 NOTE — PAYOR COMM NOTE
"[facility]  [facility]  PHONE  [phone]  FAX  [phone]  NPI:  [id number]    PATIENT D/C 7/10/2022  NEED ADDITIONAL DAYS APPROVED.   AUTH#[id number]    Reny Merino (62 y.o. Female)             Date of Birth   1960    Social Security Number       Address   [address] [place] [address]    Home Phone   [phone]    MRN   [id number]       Mosque   Muslim    Marital Status                               Admission Date   7/1/22    Admission Type   Emergency    Admitting Provider   Berenice Hicks DO    Attending Provider       Department, Room/Bed   [facility] PROGRESS CARE, P219/1P       Discharge Date   7/10/2022    Discharge Disposition   Home or Self Care    Discharge Destination                               Attending Provider: (none)   Allergies: Penicillins, Codeine, Sulfa Antibiotics    Isolation: None   Infection: None   Code Status: Prior   Advance Care Planning Activity    Ht: 165.1 cm (65\")   Wt: 48 kg (105 lb 12.8 oz)    Admission Cmt: None   Principal Problem: None                Active Insurance as of 7/1/2022     Primary Coverage     Payor Plan Insurance Group Employer/Plan Group    [employer] [place] [employer] [place]      Payor Plan Address Payor Plan Phone Number Payor Plan Fax Number Effective Dates    [address] [address] [phone]  4/12/2017 - None Entered    [facility] [id number]       Subscriber Name Subscriber Birth Date Member ID       RENY MERINO 1960 [id number]                 Emergency Contacts      (Rel.) Home Phone Work Phone Mobile Phone    Cliff Leiva (Significant Other) [phone] -- [phone]    Liza Oliva (Daughter) [phone] -- [phone]              "

## 2022-07-19 NOTE — PAYOR COMM NOTE
"[facility]  [facility]  PHONE  [phone]  FAX  [phone]  NPI:  [id number]    AUTH#[id number].  NEED ADDITIONAL DAYS APPROVED.    Reny Merino (62 y.o. Female)             Date of Birth   1960    Social Security Number       Address   [address] [place] [address]    Home Phone   [phone]    MRN   [id number]       Rastafarian   Adventism    Marital Status                               Admission Date   7/1/22    Admission Type   Emergency    Admitting Provider   Berenice Hicks DO    Attending Provider       Department, Room/Bed   [facility], P219/1P       Discharge Date   7/10/2022    Discharge Disposition   Home or Self Care    Discharge Destination                               Attending Provider: (none)   Allergies: Penicillins, Codeine, Sulfa Antibiotics    Isolation: None   Infection: None   Code Status: Prior   Advance Care Planning Activity    Ht: 165.1 cm (65\")   Wt: 48 kg (105 lb 12.8 oz)    Admission Cmt: None   Principal Problem: None                Active Insurance as of 7/1/2022     Primary Coverage     Payor Plan Insurance Group Employer/Plan Group    [employer] [place] [employer]      Payor Plan Address Payor Plan Phone Number Payor Plan Fax Number Effective Dates    [address] [address] [phone]  4/12/2017 - None Entered    [facility] [id number]       Subscriber Name Subscriber Birth Date Member ID       RENY MERINO 1960 [id number]                 Emergency Contacts      (Rel.) Home Phone Work Phone Mobile Phone    Cliff Leiva (Significant Other) [phone] -- [phone]    HazelLiza felix (Daughter) [phone] -- [phone]               Discharge Summary      Mark Crawford DO at 07/10/22 0847              [facility] DISCHARGE SUMMARY      Date of Admission: 7/1/2022    Date of Discharge: 7/10/2022     PCP: Susan Stephens APRN    Admission Diagnosis:   Please see admission " H&P    Discharge Diagnosis:   Acute to subacute ischemic strokes  Hypertensive urgency  Autonomic insufficiency with supine HTN and severe orthostatic hypotension    Nausea and vomiting with recent weight loss  Recent traumatic subdural hematoma  Hypokalemia  Hypomagnesemia  Ongoing vision changes  ESRD on PD  DM II, non-insulin dependent with neuropathy  Chronic diastolic CHF  Chronic normocytic anemia  Hyperlipidemia  Hypothyroidism  PAD  Generalized weakness    Procedures Performed:  7/2/22: Right IJ central line placement     Consults:   Consults     Date and Time Order Name Status Description    7/5/2022  6:43 AM Inpatient Ophthalmology Consult      7/2/2022  1:42 AM Inpatient Nephrology Consult Completed     7/2/2022  1:42 AM Inpatient Neurology Consult Stroke              History of Present Illness:  Reny Elena is a 62 y.o. female who presented to [facility] ED with CC of persistent nausea and vomiting. Please see admission H&P for complete details.     In the ED, she was hypertensive with SBP in the 200's. Initial troponin T was 0.085 with repeat at 0.081.  ABG with pH 7.460, PCO2 39.6, PO2 75.3, HCO3 28.1, and oxygen saturation 96.4% on room air. CMP with glucose 206, creatinine 3.38, BUN 37, EGFR 14.6, and albumin 3.41.  Blood acetone was negative.  Lipase was negative. CBC with hemoglobin 11.8, platelets 131,000, otherwise grossly unremarkable.  COVID-19 and influenza screening were negative.  Chest x-ray did not reveal any evidence of acute cardiopulmonary disease.  CT abdomen pelvis without contrast revealed small volume ascites or dialysate with percutaneous dialysis catheter in place. There was no evidence of obstruction.  CT head without contrast revealed atrophy and chronic microangiopathic changes with no parenchymal mass/hemorrhage/midline shift appreciated.  MRI brain without contrast with a few small patchy foci of acute versus early subacute infarct involving the left frontal lobe and left  cerebral hemisphere.  Previously seen subdural hematomas no longer visualized.  There is extensive presumed chronic microvascular ischemic changes with multiple old lacunar infarcts noted.She was given IV hydralazine x 2 in addition to IV Compazine x 2. Teleneurology was contacted in the ED.        Hospital Course  Reny Elena was admitted to the PCU for further evaluation and treatment. She was continued on high dose statin. ASA was initially held in the setting of her recent hx of subdural hematoma. Teleneurology was consulted for further input. PT/OT and SLP were consulted. Nephrology was consulted for management of her ESRD on PD.    Neurology voiced concern for possible cardioembolic etiology vs small vessel etiology of her ischemic strokes. No significant stenosis was noted on MRA. Echo revealed an EF of 51-55%, grade I diastolic dysfunction, trace AR and mild AS with negative saline test. Low dose ASA was later initiated in addition to high dose statin and she tolerated it well. Teleneurology recommended an outpatient event monitor. Her BP remained elevated upon admission with permissive HTN initially allowed. However, her BP still remained elevated and a Cardizem gtt was initiated with subsequent improvement.     Once weaned off the Cardizem gtt her home Coreg was resumed with initiation of Procardia. However, her BP subsequently dropped significantly requiring placement of a central line and initiation of vasopressor support. Her BP quickly improved and she was weaned off vasopressor support. Nephrology is very familiar with Ms. Elena and noted hx of similar episodes with dialysis. Concern for autonomic insufficiency was noted with supine HTN and severe orthostatic hypotension. All scheduled antihypertensives were stopped. She was initially placed on PRN Clonidine with orders for Rx to be given for SBP>190. However, her BP continued to fluctuate requiring PRN Clonidine with subsequent hypotension briefly  requiring vasopressor support. Nephrology later added PRN hydralazine for SBP>185 and changed PRN Clonidine to PRN for SBP>220. In the setting of widely fluctuating BP's will need to allow permissive HTN to avoid hypotension. With these changes her BP did remain overall more stable. She reported ongoing vision changes over the last several weeks. Ophthalmology was consulted who noted concern for PCO in the left eye with recs for dilated exam and possible YAG capsulotomy in their office after discharge.      In the setting of her nausea and vomiting with recent weight loss neurology recommended an LP with concern for possible hypercoagulability/underlying malignancy in the setting of CVA's. However, Ms. Elena respectfully declined LP and expressed understanding of the outlined concerns. No obvious malignancy was noted on imaging at present. In the setting of suspected gastroparesis a gastric emptying study was obtained which was normal. SBFT revealed esophageal tertiary contractions with mild esophageal dysmotility and reflux to level of mid thoracic esophagus with unremarkable small bowel follow-through. She was continued on PPI therapy. A trial of Reglan was initiated and she reported improvement in her symptoms. No further episodes of vomiting were reported with improvement in her nausea and PO intake. She experienced some constipation which was addressed during her hospitalization.     SS was consulted to assist with discharge planning. She worked with PT/OT and initially expressed interest in short term rehab. An inpatient rehab referral was placed and submitted to her insurance for approval. Approval was obtained with placement initially delayed 2/2 her labile BP. Once her BP and nausea improved she felt much improved and ultimately decided that she preferred to return home with home health rather than proceed with rehab placement. Inpatient rehab was encouraged but she politely preferred to return home at  discharge.     Ms. Elena was seen and examined with GLORIA Savage on 7/10/22. She remained afebrile and hemodynamically stable with stable AM labs. She felt much improved from upon admission and was eager to go home. She was deemed medically stable for discharge. Home health was ordered at discharge. Orders for an event monitor were placed as well. Instructions were given for follow up with her PCP, neurology and ophthalmology.     Condition on Discharge:  Stable    Vital Signs  Vitals:    07/10/22 0803   BP: 150/86   Pulse: 103   Resp: 16   Temp: 98.2 °F (36.8 °C)   SpO2: 91%       Physical Exam:  General:    Awake, alert, in no acute distress, chronically ill appearing   Heart:      Normal S1 and S2. Regular rate and rhythm. No significant murmur, rubs or gallops appreciated.   Lungs:     Respirations regular, even and unlabored. Lungs clear to auscultation B/L. No wheezes, rales or rhonchi.   Abdomen:   Soft and nontender. No guarding, rebound tenderness or  organomegaly noted. Bowel sounds present x 4.   Extremities:  No clubbing, cyanosis or edema noted. Moves UE and LE equally B/L with generalized weakness.     Discharge Disposition:   home      Discharge Medications:     Discharge Medications      New Medications      Instructions Start Date   aspirin 81 MG EC tablet   81 mg, Oral, Daily      atorvastatin 80 MG tablet  Commonly known as: LIPITOR   80 mg, Oral, Nightly      folic acid 1 MG tablet  Commonly known as: FOLVITE   1 mg, Oral, Daily      metoclopramide 5 MG tablet  Commonly known as: REGLAN   5 mg, Oral, 3 Times Daily Before Meals      multivitamin tablet tablet   1 tablet, Oral, Daily      thiamine 100 MG tablet tablet  Commonly known as: VITAMIN B-1   100 mg, Oral, Daily         Changes to Medications      Instructions Start Date   cloNIDine 0.1 MG tablet  Commonly known as: CATAPRES  What changed: reasons to take this   0.1 mg, Oral, 3 Times Daily PRN      docusate sodium 100 MG capsule  Commonly  known as: COLACE  What changed:   · when to take this  · reasons to take this   100 mg, Oral, 2 Times Daily      hydrALAZINE 100 MG tablet  Commonly known as: APRESOLINE  What changed:   · medication strength  · how much to take  · when to take this  · reasons to take this   100 mg, Oral, 3 Times Daily PRN         Continue These Medications      Instructions Start Date   FLUoxetine 20 MG capsule  Commonly known as: PROzac   40 mg, Oral, Daily      fluticasone 50 MCG/ACT nasal spray  Commonly known as: FLONASE   2 sprays, Nasal, Daily PRN      gabapentin 300 MG capsule  Commonly known as: NEURONTIN   300 mg, Oral, Daily      HYDROcodone-acetaminophen  MG per tablet  Commonly known as: NORCO   1 tablet, Oral, 3 Times Daily PRN      hydrOXYzine 25 MG tablet  Commonly known as: ATARAX   25 mg, Oral, 3 Times Daily PRN      insulin aspart 100 UNIT/ML injection  Commonly known as: novoLOG   2-4 Units, Subcutaneous, 3 Times Daily Before Meals      levothyroxine 25 MCG tablet  Commonly known as: SYNTHROID, LEVOTHROID   25 mcg, Oral, Daily      loratadine 10 MG tablet  Commonly known as: CLARITIN   10 mg, Oral, Daily      ondansetron ODT 4 MG disintegrating tablet  Commonly known as: ZOFRAN-ODT   4 mg, Translingual, Daily PRN      pantoprazole 40 MG EC tablet  Commonly known as: PROTONIX   40 mg, Oral, Daily      rOPINIRole 0.5 MG tablet  Commonly known as: REQUIP   0.5 mg, Oral, 2 Times Daily      tiZANidine 4 MG tablet  Commonly known as: ZANAFLEX   4 mg, Oral, Every 6 Hours PRN         Stop These Medications    carvedilol 12.5 MG tablet  Commonly known as: COREG     clopidogrel 75 MG tablet  Commonly known as: PLAVIX     isosorbide mononitrate 30 MG 24 hr tablet  Commonly known as: IMDUR     lisinopril 20 MG tablet  Commonly known as: PRINIVIL,ZESTRIL     potassium chloride 20 MEQ CR tablet  Commonly known as: K-DUR,KLOR-CON     pravastatin 20 MG tablet  Commonly known as: PRAVACHOL     traZODone 100 MG  tablet  Commonly known as: DESYREL              Discharge Diet:   Dietary Orders (From admission, onward)     Start     Ordered    07/08/22 1106  Diet Regular; Cardiac, Consistent Carbohydrate  Diet Effective Now        Question Answer Comment   Diet Texture / Consistency Regular    Common Modifiers Cardiac    Common Modifiers Consistent Carbohydrate        07/08/22 1105    07/04/22 1200  Dietary Nutrition Supplements Novasource Renal (Nepro)  Daily With Lunch & Dinner      Question:  Select Supplement  Answer:  Novasource Renal (Nepro)    07/04/22 1018                Activity at Discharge:  activity as tolerated    Follow-up Appointments:  Additional Instructions for the Follow-ups that You Need to Schedule     Ambulatory Referral to Home Health   As directed      Face to Face Visit Date: 7/10/2022    Follow-up provider for Plan of Care?: I treated the patient in an acute care facility and will not continue treatment after discharge.    Follow-up provider: SUSAN JETT [[id number]]    Reason/Clinical Findings: Acute and subacute CVA's    Describe mobility limitations that make leaving home difficult: Acute and subacute CVA's, debility    Nursing/Therapeutic Services Requested: Skilled Nursing Physical Therapy Occupational Therapy    Skilled nursing orders: Cardiopulmonary assessments Medication education Neurovascular assessments    Frequency: 1 Week 1         Discharge Follow-up with PCP   As directed       Currently Documented PCP:    Susan Jett APRN    PCP Phone Number:    [phone]     Follow Up Details: Follow up with DARIUS Jimenez in 1 week.         Discharge Follow-up with Specified Provider: Follow up with neurology in 2-4 weeks.   As directed      To: Follow up with neurology in 2-4 weeks.         Discharge Follow-up with Specified Provider: Follow up with ophthalmology (Dr. Lynn) in 1 week.   As directed      To: Follow up with ophthalmology (Dr. Lynn) in 1 week.            Contact  information for follow-up providers     Susan Stephens APRN .    Specialty: Nurse Practitioner  Why: Follow up with DARIUS Jimenez in 1 week.  Contact information:  [address] [facility] [facility] [address] [phone]                   Contact information for after-discharge care     Destination     [facility] .    Service: Inpatient Rehabilitation  Contact information:  [address] [facility] [address] [phone]                                 Test Results Pending at Discharge:       The ASCVD Risk score (Ирина LORA Jr., et al., 2013) failed to calculate for the following reasons:    The patient has a prior MI or stroke diagnosis      Mark Crawford DO  07/10/22  08:47 EDT      Time: Greater than 30 minutes spent on this discharge.          Electronically signed by Mark Crawford DO at 07/10/22 1936

## 2022-09-07 ENCOUNTER — HOSPITAL ENCOUNTER (EMERGENCY)
Facility: HOSPITAL | Age: 62
Discharge: HOME | End: 2022-09-08
Attending: STUDENT IN AN ORGANIZED HEALTH CARE EDUCATION/TRAINING PROGRAM | Admitting: STUDENT IN AN ORGANIZED HEALTH CARE EDUCATION/TRAINING PROGRAM

## 2022-09-07 ENCOUNTER — APPOINTMENT (OUTPATIENT)
Dept: GENERAL RADIOLOGY | Facility: HOSPITAL | Age: 62
End: 2022-09-07

## 2022-09-07 DIAGNOSIS — I10 ASYMPTOMATIC HYPERTENSION: ICD-10-CM

## 2022-09-07 DIAGNOSIS — Z99.2 PATIENT ON PERITONEAL DIALYSIS: Primary | ICD-10-CM

## 2022-09-07 DIAGNOSIS — G89.4 CHRONIC PAIN SYNDROME: ICD-10-CM

## 2022-09-07 LAB
ALBUMIN SERPL-MCNC: 3.68 G/DL (ref 3.5–5.2)
ALBUMIN/GLOB SERPL: 1.3 G/DL
ALP SERPL-CCNC: 120 U/L (ref 39–117)
ALT SERPL W P-5'-P-CCNC: 16 U/L (ref 1–33)
ANION GAP SERPL CALCULATED.3IONS-SCNC: 15.1 MMOL/L (ref 5–15)
APTT PPP: 26 SECONDS (ref 26.5–34.5)
AST SERPL-CCNC: 22 U/L (ref 1–32)
BACTERIA UR QL AUTO: ABNORMAL /HPF
BASOPHILS # BLD AUTO: 0.04 10*3/MM3 (ref 0–0.2)
BASOPHILS NFR BLD AUTO: 0.6 % (ref 0–1.5)
BILIRUB SERPL-MCNC: 0.3 MG/DL (ref 0–1.2)
BILIRUB UR QL STRIP: NEGATIVE
BUN SERPL-MCNC: 23 MG/DL (ref 8–23)
BUN/CREAT SERPL: 7.2 (ref 7–25)
CALCIUM SPEC-SCNC: 9.2 MG/DL (ref 8.6–10.5)
CHLORIDE SERPL-SCNC: 98 MMOL/L (ref 98–107)
CLARITY UR: CLEAR
CO2 SERPL-SCNC: 24.9 MMOL/L (ref 22–29)
COLOR UR: ABNORMAL
CREAT SERPL-MCNC: 3.2 MG/DL (ref 0.57–1)
D-LACTATE SERPL-SCNC: 0.7 MMOL/L (ref 0.5–2)
DEPRECATED RDW RBC AUTO: 52.7 FL (ref 37–54)
EGFRCR SERPLBLD CKD-EPI 2021: 15.6 ML/MIN/1.73
EOSINOPHIL # BLD AUTO: 0.11 10*3/MM3 (ref 0–0.4)
EOSINOPHIL NFR BLD AUTO: 1.7 % (ref 0.3–6.2)
ERYTHROCYTE [DISTWIDTH] IN BLOOD BY AUTOMATED COUNT: 15.1 % (ref 12.3–15.4)
FLUAV RNA RESP QL NAA+PROBE: NOT DETECTED
FLUBV RNA RESP QL NAA+PROBE: NOT DETECTED
GLOBULIN UR ELPH-MCNC: 2.8 GM/DL
GLUCOSE SERPL-MCNC: 174 MG/DL (ref 65–99)
GLUCOSE UR STRIP-MCNC: ABNORMAL MG/DL
HCT VFR BLD AUTO: 37.7 % (ref 34–46.6)
HGB BLD-MCNC: 12.4 G/DL (ref 12–15.9)
HGB UR QL STRIP.AUTO: NEGATIVE
HOLD SPECIMEN: NORMAL
HOLD SPECIMEN: NORMAL
HYALINE CASTS UR QL AUTO: ABNORMAL /LPF
IMM GRANULOCYTES # BLD AUTO: 0.02 10*3/MM3 (ref 0–0.05)
IMM GRANULOCYTES NFR BLD AUTO: 0.3 % (ref 0–0.5)
INR PPP: 0.85 (ref 0.9–1.1)
KETONES UR QL STRIP: NEGATIVE
LEUKOCYTE ESTERASE UR QL STRIP.AUTO: NEGATIVE
LYMPHOCYTES # BLD AUTO: 1.18 10*3/MM3 (ref 0.7–3.1)
LYMPHOCYTES NFR BLD AUTO: 18.4 % (ref 19.6–45.3)
MCH RBC QN AUTO: 31.2 PG (ref 26.6–33)
MCHC RBC AUTO-ENTMCNC: 32.9 G/DL (ref 31.5–35.7)
MCV RBC AUTO: 94.7 FL (ref 79–97)
MONOCYTES # BLD AUTO: 0.22 10*3/MM3 (ref 0.1–0.9)
MONOCYTES NFR BLD AUTO: 3.4 % (ref 5–12)
NEUTROPHILS NFR BLD AUTO: 4.85 10*3/MM3 (ref 1.7–7)
NEUTROPHILS NFR BLD AUTO: 75.6 % (ref 42.7–76)
NITRITE UR QL STRIP: NEGATIVE
NRBC BLD AUTO-RTO: 0 /100 WBC (ref 0–0.2)
PH UR STRIP.AUTO: 6 [PH] (ref 5–8)
PLATELET # BLD AUTO: 207 10*3/MM3 (ref 140–450)
PMV BLD AUTO: 9.3 FL (ref 6–12)
POTASSIUM SERPL-SCNC: 4.5 MMOL/L (ref 3.5–5.2)
PROT SERPL-MCNC: 6.5 G/DL (ref 6–8.5)
PROT UR QL STRIP: ABNORMAL
PROTHROMBIN TIME: 11.8 SECONDS (ref 12.1–14.7)
RBC # BLD AUTO: 3.98 10*6/MM3 (ref 3.77–5.28)
RBC # UR STRIP: ABNORMAL /HPF
REF LAB TEST METHOD: ABNORMAL
SARS-COV-2 RNA RESP QL NAA+PROBE: NOT DETECTED
SODIUM SERPL-SCNC: 138 MMOL/L (ref 136–145)
SP GR UR STRIP: 1.01 (ref 1–1.03)
SQUAMOUS #/AREA URNS HPF: ABNORMAL /HPF
UROBILINOGEN UR QL STRIP: ABNORMAL
WBC # UR STRIP: ABNORMAL /HPF
WBC NRBC COR # BLD: 6.42 10*3/MM3 (ref 3.4–10.8)
WHOLE BLOOD HOLD COAG: NORMAL
WHOLE BLOOD HOLD SPECIMEN: NORMAL

## 2022-09-07 PROCEDURE — 83605 ASSAY OF LACTIC ACID: CPT | Performed by: STUDENT IN AN ORGANIZED HEALTH CARE EDUCATION/TRAINING PROGRAM

## 2022-09-07 PROCEDURE — 99284 EMERGENCY DEPT VISIT MOD MDM: CPT

## 2022-09-07 PROCEDURE — 80053 COMPREHEN METABOLIC PANEL: CPT | Performed by: STUDENT IN AN ORGANIZED HEALTH CARE EDUCATION/TRAINING PROGRAM

## 2022-09-07 PROCEDURE — 85610 PROTHROMBIN TIME: CPT | Performed by: STUDENT IN AN ORGANIZED HEALTH CARE EDUCATION/TRAINING PROGRAM

## 2022-09-07 PROCEDURE — 87636 SARSCOV2 & INF A&B AMP PRB: CPT | Performed by: STUDENT IN AN ORGANIZED HEALTH CARE EDUCATION/TRAINING PROGRAM

## 2022-09-07 PROCEDURE — 85025 COMPLETE CBC W/AUTO DIFF WBC: CPT | Performed by: STUDENT IN AN ORGANIZED HEALTH CARE EDUCATION/TRAINING PROGRAM

## 2022-09-07 PROCEDURE — P9612 CATHETERIZE FOR URINE SPEC: HCPCS

## 2022-09-07 PROCEDURE — 71045 X-RAY EXAM CHEST 1 VIEW: CPT

## 2022-09-07 PROCEDURE — 93005 ELECTROCARDIOGRAM TRACING: CPT | Performed by: STUDENT IN AN ORGANIZED HEALTH CARE EDUCATION/TRAINING PROGRAM

## 2022-09-07 PROCEDURE — 93010 ELECTROCARDIOGRAM REPORT: CPT | Performed by: INTERNAL MEDICINE

## 2022-09-07 PROCEDURE — 85730 THROMBOPLASTIN TIME PARTIAL: CPT | Performed by: STUDENT IN AN ORGANIZED HEALTH CARE EDUCATION/TRAINING PROGRAM

## 2022-09-07 PROCEDURE — 81001 URINALYSIS AUTO W/SCOPE: CPT | Performed by: STUDENT IN AN ORGANIZED HEALTH CARE EDUCATION/TRAINING PROGRAM

## 2022-09-07 PROCEDURE — 96374 THER/PROPH/DIAG INJ IV PUSH: CPT

## 2022-09-07 RX ORDER — SODIUM CHLORIDE 0.9 % (FLUSH) 0.9 %
10 SYRINGE (ML) INJECTION AS NEEDED
Status: DISCONTINUED | OUTPATIENT
Start: 2022-09-07 | End: 2022-09-08 | Stop reason: HOSPADM

## 2022-09-07 RX ORDER — CLONIDINE HYDROCHLORIDE 0.1 MG/1
0.1 TABLET ORAL ONCE
Status: COMPLETED | OUTPATIENT
Start: 2022-09-07 | End: 2022-09-07

## 2022-09-07 RX ORDER — LABETALOL HYDROCHLORIDE 5 MG/ML
10 INJECTION, SOLUTION INTRAVENOUS ONCE
Status: COMPLETED | OUTPATIENT
Start: 2022-09-07 | End: 2022-09-07

## 2022-09-07 RX ADMIN — CLONIDINE HYDROCHLORIDE 0.1 MG: 0.1 TABLET ORAL at 23:43

## 2022-09-07 RX ADMIN — LABETALOL HYDROCHLORIDE 10 MG: 5 INJECTION INTRAVENOUS at 19:55

## 2022-09-07 NOTE — ED NOTES
Patient advises she has had nausea and vomiting x3 days. States she started having SOB approx 90 min PTA and it has continued to get worse. Patient is a home peritoneal dialysis which she does every night getting approx 1200mls off.

## 2022-09-07 NOTE — ED PROVIDER NOTES
Subjective   PIT    Patient is a 62-year-old female on peritoneal home dialysis daily that presents with complaints of nausea, vomiting and chills.  Patient states that she chronically has elevated blood pressure that is difficult to control she is on hydralazine 3 times daily and she is taken 2 of her 3 doses prior to ED arrival.          Review of Systems   Constitutional: Positive for chills.   Gastrointestinal: Positive for diarrhea and nausea.   All other systems reviewed and are negative.      Past Medical History:   Diagnosis Date   • Arthritis    • Closed fracture of right fibula and tibia 2018   • Depression    • Diabetic ulcer of left foot associated with type 2 diabetes mellitus ([facility]) 2017   • Diastolic dysfunction    • Disease of thyroid gland    • Elevated cholesterol    • End stage renal disease ([facility])    • Essential hypertension    • GERD (gastroesophageal reflux disease)    • History of transfusion    • Hyperlipidemia    • Iron deficiency anemia 2018   • PAD (peripheral artery disease) ([facility])    • Renal failure    • Type 2 diabetes mellitus with hyperglycemia, with long-term current use of insulin ([facility]) 2018       Allergies   Allergen Reactions   • Penicillins Hives and GI Intolerance     Patient has received cefazolin, ceftriaxone and cefepime during past admissions.   • Codeine Hives, Rash and GI Intolerance     Pt tolerates Norco @ home   • Sulfa Antibiotics Hives, Rash and GI Intolerance     NAUSEA TOO       Past Surgical History:   Procedure Laterality Date   • ABDOMINAL SURGERY     • BACK SURGERY      Post spinal diskectomy, osteophytectomy in one lumbar interspace   • CATARACT EXTRACTION      Left    •  SECTION     • DENTAL PROCEDURE     • ENDOSCOPY     • FRACTURE SURGERY      right leg   • HYSTERECTOMY     • INCISION AND DRAINAGE LEG Left 4/15/2017    Procedure: INCISION AND DRAINAGE LOWER EXTREMITY;  Surgeon: Basilio Morris MD;  Location: [facility];  Service:     • INCISION AND DRAINAGE LEG Left 5/26/2017    Procedure: Irrigation and Debridment abcess diabetic wound left foot with deep culture;  Surgeon: Basilio Morris MD;  Location: [facility] OR;  Service:    • INSERTION PERITONEAL DIALYSIS CATHETER N/A 12/16/2021    Procedure: INSERTION PERITONEAL DIALYSIS CATHETER LAPAROSCOPIC;  Surgeon: Edy Glover MD;  Location: [facility] OR;  Service: General;  Laterality: N/A;   • KNEE ARTHROSCOPY Right    • ORIF TIBIA FRACTURE Right 12/28/2018    Procedure: TIBIAL  FRACTURE OPEN REDUCTION INTERNAL FIXATION;  Surgeon: Jung Barragan MD;  Location: [facility] OR;  Service: Orthopedics   • TOE AMPUTATION Right     5th   • TUBAL ABDOMINAL LIGATION         Family History   Problem Relation Age of Onset   • Heart disease Mother    • Cancer Mother    • COPD Mother    • Diabetes Other    • Depression Other        Social History     Socioeconomic History   • Marital status:    Tobacco Use   • Smoking status: Never Smoker   • Smokeless tobacco: Never Used   Vaping Use   • Vaping Use: Never used   Substance and Sexual Activity   • Alcohol use: No   • Drug use: No   • Sexual activity: Defer           Objective   Physical Exam  Vitals and nursing note reviewed.   Constitutional:       General: She is not in acute distress.     Appearance: She is not diaphoretic.      Comments: Chronically ill-appearing; older than stated age   HENT:      Head: Normocephalic and atraumatic.      Right Ear: External ear normal.      Left Ear: External ear normal.      Nose: Nose normal.   Eyes:      Extraocular Movements: Extraocular movements intact.      Conjunctiva/sclera: Conjunctivae normal.      Pupils: Pupils are equal, round, and reactive to light.   Neck:      Vascular: No JVD.      Trachea: No tracheal deviation.   Cardiovascular:      Rate and Rhythm: Normal rate and regular rhythm.      Heart sounds: Normal heart sounds. No murmur heard.  Pulmonary:      Effort: Pulmonary effort is normal. No  respiratory distress.      Breath sounds: Normal breath sounds. No wheezing.   Abdominal:      General: Bowel sounds are normal.      Palpations: Abdomen is soft.      Tenderness: There is no abdominal tenderness.   Musculoskeletal:         General: No deformity. Normal range of motion.      Cervical back: Normal range of motion and neck supple.   Skin:     General: Skin is warm and dry.      Coloration: Skin is not pale.      Findings: No erythema or rash.   Neurological:      General: No focal deficit present.      Mental Status: She is alert and oriented to person, place, and time.      Cranial Nerves: No cranial nerve deficit.   Psychiatric:         Behavior: Behavior normal.         Thought Content: Thought content normal.         Procedures       Results for orders placed or performed during the hospital encounter of 09/07/22   COVID-19 and FLU A/B PCR - Swab, Nasopharynx    Specimen: Nasopharynx; Swab   Result Value Ref Range    COVID19 Not Detected Not Detected - Ref. Range    Influenza A PCR Not Detected Not Detected    Influenza B PCR Not Detected Not Detected   Comprehensive Metabolic Panel    Specimen: Blood   Result Value Ref Range    Glucose 174 (H) 65 - 99 mg/dL    BUN 23 8 - 23 mg/dL    Creatinine 3.20 (H) 0.57 - 1.00 mg/dL    Sodium 138 136 - 145 mmol/L    Potassium 4.5 3.5 - 5.2 mmol/L    Chloride 98 98 - 107 mmol/L    CO2 24.9 22.0 - 29.0 mmol/L    Calcium 9.2 8.6 - 10.5 mg/dL    Total Protein 6.5 6.0 - 8.5 g/dL    Albumin 3.68 3.50 - 5.20 g/dL    ALT (SGPT) 16 1 - 33 U/L    AST (SGOT) 22 1 - 32 U/L    Alkaline Phosphatase 120 (H) 39 - 117 U/L    Total Bilirubin 0.3 0.0 - 1.2 mg/dL    Globulin 2.8 gm/dL    A/G Ratio 1.3 g/dL    BUN/Creatinine Ratio 7.2 7.0 - 25.0    Anion Gap 15.1 (H) 5.0 - 15.0 mmol/L    eGFR 15.6 (L) >60.0 mL/min/1.73   aPTT    Specimen: Blood   Result Value Ref Range    PTT 26.0 (L) 26.5 - 34.5 seconds   Protime-INR    Specimen: Blood   Result Value Ref Range    Protime 11.8  (L) 12.1 - 14.7 Seconds    INR 0.85 (L) 0.90 - 1.10   Urinalysis With Microscopic If Indicated (No Culture) - Urine, Catheter    Specimen: Urine, Catheter   Result Value Ref Range    Color, UA Dark Yellow (A) Yellow, Straw    Appearance, UA Clear Clear    pH, UA 6.0 5.0 - 8.0    Specific Gravity, UA 1.014 1.005 - 1.030    Glucose,  mg/dL (1+) (A) Negative    Ketones, UA Negative Negative    Bilirubin, UA Negative Negative    Blood, UA Negative Negative    Protein, UA >=300 mg/dL (3+) (A) Negative    Leuk Esterase, UA Negative Negative    Nitrite, UA Negative Negative    Urobilinogen, UA 0.2 E.U./dL 0.2 - 1.0 E.U./dL   Lactic Acid, Plasma    Specimen: Blood   Result Value Ref Range    Lactate 0.7 0.5 - 2.0 mmol/L   CBC Auto Differential    Specimen: Blood   Result Value Ref Range    WBC 6.42 3.40 - 10.80 10*3/mm3    RBC 3.98 3.77 - 5.28 10*6/mm3    Hemoglobin 12.4 12.0 - 15.9 g/dL    Hematocrit 37.7 34.0 - 46.6 %    MCV 94.7 79.0 - 97.0 fL    MCH 31.2 26.6 - 33.0 pg    MCHC 32.9 31.5 - 35.7 g/dL    RDW 15.1 12.3 - 15.4 %    RDW-SD 52.7 37.0 - 54.0 fl    MPV 9.3 6.0 - 12.0 fL    Platelets 207 140 - 450 10*3/mm3    Neutrophil % 75.6 42.7 - 76.0 %    Lymphocyte % 18.4 (L) 19.6 - 45.3 %    Monocyte % 3.4 (L) 5.0 - 12.0 %    Eosinophil % 1.7 0.3 - 6.2 %    Basophil % 0.6 0.0 - 1.5 %    Immature Grans % 0.3 0.0 - 0.5 %    Neutrophils, Absolute 4.85 1.70 - 7.00 10*3/mm3    Lymphocytes, Absolute 1.18 0.70 - 3.10 10*3/mm3    Monocytes, Absolute 0.22 0.10 - 0.90 10*3/mm3    Eosinophils, Absolute 0.11 0.00 - 0.40 10*3/mm3    Basophils, Absolute 0.04 0.00 - 0.20 10*3/mm3    Immature Grans, Absolute 0.02 0.00 - 0.05 10*3/mm3    nRBC 0.0 0.0 - 0.2 /100 WBC   Urinalysis, Microscopic Only - Urine, Catheter    Specimen: Urine, Catheter   Result Value Ref Range    RBC, UA 0-2 None Seen, 0-2 /HPF    WBC, UA 3-5 (A) None Seen, 0-2 /HPF    Bacteria, UA None Seen None Seen /HPF    Squamous Epithelial Cells, UA 0-2 None Seen, 0-2  /HPF    Hyaline Casts, UA 3-6 None Seen /LPF    Methodology Automated Microscopy    ECG 12 Lead   Result Value Ref Range    QT Interval 404 ms    QTC Interval 515 ms   Green Top (Gel)   Result Value Ref Range    Extra Tube Hold for add-ons.    Lavender Top   Result Value Ref Range    Extra Tube hold for add-on    Gold Top - SST   Result Value Ref Range    Extra Tube Hold for add-ons.    Light Blue Top   Result Value Ref Range    Extra Tube Hold for add-ons.          ED Course  ED Course as of 09/08/22 0055   Wed Sep 07, 2022   1835 ECG 12 Lead  Normal sinus rhythm  Prolonged QT  No acute ST elevation  Ventricular rate 98 bpm  GA interval 128 MS  QRS duration 80 MS  QT/QTc 404/515 MS [ES]   Thu Sep 08, 2022   0053 Patient blood pressure has responded to clonidine.  Patient was prescribed clonidine to use 3 times a day but states that she just has not been using it because apparently or supposedly it bottomed her blood pressure out when she was in the hospital.  Other than elevated blood pressure today patient is stable and at her baseline.  She was given a tablet of Norco prior to discharge. [LK]   0054 Patient has 1 soft nontender nondistended peritoneal dialysis catheter was in place without surrounding evidence of infection.  Patient had no symptoms on physical examination that would warrant CT of the abdomen. [LK]      ED Course User Index  [ES] Clay Maradiaga MD  [LK] Yvette Mckeon DO                 XR Chest 1 View   Final Result   No active disease.      Signer Name: Tru Jimenez MD    Signed: 9/7/2022 8:37 PM    Workstation Name: [id number]     [facility]    Final diagnoses:   Patient on peritoneal dialysis (HCC)   Asymptomatic hypertension   Chronic pain syndrome       ED Disposition  ED Disposition     ED Disposition   Discharge    Condition   Stable    Comment   --             Susan Stephens, APRN  [address]   [facility] [address]  [phone]               Medication List      No changes were made to your prescriptions during this visit.          Yvette Mckeon DO  09/08/22 0055

## 2022-09-08 VITALS
TEMPERATURE: 98.6 F | BODY MASS INDEX: 15.03 KG/M2 | HEART RATE: 82 BPM | SYSTOLIC BLOOD PRESSURE: 174 MMHG | DIASTOLIC BLOOD PRESSURE: 99 MMHG | RESPIRATION RATE: 18 BRPM | OXYGEN SATURATION: 98 % | HEIGHT: 64 IN | WEIGHT: 88 LBS

## 2022-09-08 LAB
QT INTERVAL: 404 MS
QTC INTERVAL: 515 MS

## 2022-09-08 RX ORDER — HYDROCODONE BITARTRATE AND ACETAMINOPHEN 10; 325 MG/1; MG/1
1 TABLET ORAL ONCE
Status: COMPLETED | OUTPATIENT
Start: 2022-09-08 | End: 2022-09-08

## 2022-09-08 RX ADMIN — HYDROCODONE BITARTRATE AND ACETAMINOPHEN 1 TABLET: 10; 325 TABLET ORAL at 00:55

## 2022-09-08 NOTE — ED NOTES
Pt boyfriend, Cliff, updated via phone call. Pt gave permission to speak.His phone # is [phone].

## 2022-09-09 NOTE — THERAPY EVALUATION
Acute Care - Occupational Therapy Initial Evaluation   [place]     Patient Name: Reny Elena  : 1960  MRN: [id number]  Today's Date: 2019  Onset of Illness/Injury or Date of Surgery: 19     Referring Physician: Dr. Ibarra    Admit Date: 2019       ICD-10-CM ICD-9-CM   1. Hypothermia, initial encounter T68.XXXA 991.6   2. Enteritis K52.9 558.9     Patient Active Problem List   Diagnosis   • Severe sepsis (CMS/[facility])   • Tibia/fibula fracture   • Iron deficiency anemia   • Type 2 diabetes mellitus with hyperglycemia, with long-term current use of insulin (CMS/[facility])   • Wound of right ankle   • Hyperglycemia   • Confusion   • Cellulitis   • Metabolic encephalopathy   • History of non-ST elevation myocardial infarction (NSTEMI)   • CKD (chronic kidney disease) stage 3, GFR 30-59 ml/min (CMS/[facility])   • Elevated troponin   • HTN (hypertension)   • CVA (cerebral vascular accident) (CMS/[facility])   • Chronic diastolic CHF (congestive heart failure) (CMS/[facility])   • Decubitus ulcer of coccyx, unspecified pressure ulcer stage   • Depression   • Hyperkalemia   • Expressive aphasia   • Impaired mobility and ADLs   • Hypothermia     Past Medical History:   Diagnosis Date   • Arthritis    • Closed fracture of right fibula and tibia 2018   • Depression    • Diabetic ulcer of left foot associated with type 2 diabetes mellitus (CMS/[facility]) 2017   • Diastolic dysfunction    • Essential hypertension    • Hyperlipidemia    • Iron deficiency anemia 2018   • PAD (peripheral artery disease) (CMS/[facility])    • Type 2 diabetes mellitus with hyperglycemia, with long-term current use of insulin (CMS/[facility]) 2018     Past Surgical History:   Procedure Laterality Date   • BACK SURGERY      Post spinal diskectomy, osteophytectomy in one lumbar interspace   • CATARACT EXTRACTION      Left    •  SECTION     • DENTAL PROCEDURE     • HYSTERECTOMY     • INCISION AND DRAINAGE LEG Left 4/15/2017    Procedure:  INCISION AND DRAINAGE LOWER EXTREMITY;  Surgeon: Basilio Morris MD;  Location: [facility] OR;  Service:    • INCISION AND DRAINAGE LEG Left 5/26/2017    Procedure: Irrigation and Debridment abcess diabetic wound left foot with deep culture;  Surgeon: Basilio Morris MD;  Location: [facility] OR;  Service:    • KNEE ARTHROSCOPY Right    • ORIF TIBIA FRACTURE Right 12/28/2018    Procedure: TIBIAL  FRACTURE OPEN REDUCTION INTERNAL FIXATION;  Surgeon: Jung Barragan MD;  Location: [facility] OR;  Service: Orthopedics   • TOE AMPUTATION Right     5th   • TUBAL ABDOMINAL LIGATION            OT ASSESSMENT FLOWSHEET (last 12 hours)      Occupational Therapy Evaluation     Row Name 08/06/19 1555                   OT Evaluation Time/Intention    Document Type  evaluation  -KR        Mode of Treatment  physical therapy  -KR        Patient Effort  good  -KR           General Information    Patient Observations  alert;cooperative;agree to therapy  -KR           Cognitive Assessment/Intervention- PT/OT    Orientation Status (Cognition)  oriented x 3  -KR        Follows Commands (Cognition)  WFL  -KR           ADL Assessment/Intervention    BADL Assessment/Intervention  bathing;upper body dressing;lower body dressing;grooming;feeding;toileting  -KR           Bathing Assessment/Intervention    Bathing [place] Level  bathing skills;minimum assist (75% patient effort)  -KR           Upper Body Dressing Assessment/Training    Upper Body Dressing [place] Level  upper body dressing skills;minimum assist (75% patient effort)  -KR           Lower Body Dressing Assessment/Training    Lower Body Dressing [place] Level  lower body dressing skills;minimum assist (75% patient effort)  -KR           Grooming Assessment/Training    [place] Level (Grooming)  grooming skills;minimum assist (75% patient effort)  -KR           Self-Feeding Assessment/Training    [place] Level (Feeding)  feeding skills;set up  -KR           Toileting  Assessment/Training    [place] Level (Toileting)  toileting skills;minimum assist (75% patient effort)  -KR           General ROM    GENERAL ROM COMMENTS  BUE WFL  -KR           MMT (Manual Muscle Testing)    General MMT Comments  BUE 3-/5  -KR           Clinical Impression (OT)    Criteria for Skilled Therapeutic Interventions Met (OT Eval)  yes  -KR        Rehab Potential (OT Eval)  good, to achieve stated therapy goals  -KR           Planned OT Interventions    Planned Therapy Interventions (OT Eval)  strengthening exercise  -KR           OT Goals    Strength Goal Selection (OT)  strength, OT goal 1  -KR        Additional Documentation  Strength Goal Selection (OT) (Row)  -KR           Strength Goal 1 (OT)    Strength Goal 1 (OT)  BUE increase x 1 to enhance self care skills  -KR        Time Frame (Strength Goal 1, OT)  by discharge  -KR          User Key  (r) = Recorded By, (t) = Taken By, (c) = Cosigned By    Initials Name Effective Dates    KR Helder Cam, OT 04/03/18 -                OT Recommendation and Plan  Planned Therapy Interventions (OT Eval): strengthening exercise       Outcome Measures     Row Name 08/06/19 1607 08/06/19 1600 08/06/19 1500       How much help from another person do you currently need...    Turning from your back to your side while in flat bed without using bedrails?  --  --  4  -AG    Moving from lying on back to sitting on the side of a flat bed without bedrails?  --  --  4  -AG    Moving to and from a bed to a chair (including a wheelchair)?  --  --  3  -AG    Standing up from a chair using your arms (e.g., wheelchair, bedside chair)?  --  --  3  -AG    Climbing 3-5 steps with a railing?  --  --  2  -AG    To walk in hospital room?  --  --  3  -AG    AM-PAC 6 Clicks Score (PT)  --  --  19  -AG       How much help from another is currently needed...    Putting on and taking off regular lower body clothing?  3  -KR  --  --    Bathing (including washing, rinsing, and drying)   3  -KR  --  --    Toileting (which includes using toilet bed pan or urinal)  3  -KR  --  --    Putting on and taking off regular upper body clothing  3  -KR  --  --    Taking care of personal grooming (such as brushing teeth)  3  -KR  --  --    Eating meals  3  -KR  --  --    AM-PAC 6 Clicks Score (OT)  18  -KR  --  --       Functional Assessment    Outcome Measure Options  --  AM-PAC 6 Clicks Daily Activity (OT)  -KR  AM-PAC 6 Clicks Basic Mobility (PT)  -AG      User Key  (r) = Recorded By, (t) = Taken By, (c) = Cosigned By    Initials Name Provider Type    AG Anu Chin, PT Physical Therapist    Helder Panda, OT Occupational Therapist          Time Calculation:     Therapy Charges for Today     Code Description Service Date Service Provider Modifiers Qty    [id number]  OT EVAL MOD COMPLEXITY 4 8/6/2019 Helder Cam OT GO 1               Helder Cam OT  8/6/2019   [FreeTextEntry1] : dyspareunia: discussed levator ani tenderness. massage, relaxation recommended, pelvic floor pt prn

## 2022-11-07 ENCOUNTER — LAB (OUTPATIENT)
Dept: LAB | Facility: HOSPITAL | Age: 62
End: 2022-11-07

## 2022-11-07 ENCOUNTER — TRANSCRIBE ORDERS (OUTPATIENT)
Dept: ADMINISTRATIVE | Facility: HOSPITAL | Age: 62
End: 2022-11-07

## 2022-11-07 DIAGNOSIS — N25.81 SECONDARY HYPERPARATHYROIDISM OF RENAL ORIGIN: ICD-10-CM

## 2022-11-07 DIAGNOSIS — N18.6 END STAGE RENAL DISEASE: Primary | ICD-10-CM

## 2022-11-07 DIAGNOSIS — N18.6 END STAGE RENAL DISEASE: ICD-10-CM

## 2022-11-07 LAB
BASOPHILS # BLD AUTO: 0.04 10*3/MM3 (ref 0–0.2)
BASOPHILS NFR BLD AUTO: 1.1 % (ref 0–1.5)
DEPRECATED RDW RBC AUTO: 44.4 FL (ref 37–54)
EOSINOPHIL # BLD AUTO: 0.09 10*3/MM3 (ref 0–0.4)
EOSINOPHIL NFR BLD AUTO: 2.4 % (ref 0.3–6.2)
ERYTHROCYTE [DISTWIDTH] IN BLOOD BY AUTOMATED COUNT: 13.1 % (ref 12.3–15.4)
HCT VFR BLD AUTO: 30 % (ref 34–46.6)
HGB BLD-MCNC: 10 G/DL (ref 12–15.9)
IMM GRANULOCYTES # BLD AUTO: 0.01 10*3/MM3 (ref 0–0.05)
IMM GRANULOCYTES NFR BLD AUTO: 0.3 % (ref 0–0.5)
LYMPHOCYTES # BLD AUTO: 0.95 10*3/MM3 (ref 0.7–3.1)
LYMPHOCYTES NFR BLD AUTO: 25.4 % (ref 19.6–45.3)
MCH RBC QN AUTO: 31.2 PG (ref 26.6–33)
MCHC RBC AUTO-ENTMCNC: 33.3 G/DL (ref 31.5–35.7)
MCV RBC AUTO: 93.5 FL (ref 79–97)
MONOCYTES # BLD AUTO: 0.12 10*3/MM3 (ref 0.1–0.9)
MONOCYTES NFR BLD AUTO: 3.2 % (ref 5–12)
NEUTROPHILS NFR BLD AUTO: 2.53 10*3/MM3 (ref 1.7–7)
NEUTROPHILS NFR BLD AUTO: 67.6 % (ref 42.7–76)
NRBC BLD AUTO-RTO: 0 /100 WBC (ref 0–0.2)
PLATELET # BLD AUTO: 187 10*3/MM3 (ref 140–450)
PMV BLD AUTO: 9.9 FL (ref 6–12)
PTH-INTACT SERPL-MCNC: 84.8 PG/ML (ref 15–65)
RBC # BLD AUTO: 3.21 10*6/MM3 (ref 3.77–5.28)
WBC NRBC COR # BLD: 3.74 10*3/MM3 (ref 3.4–10.8)

## 2022-11-07 PROCEDURE — 85025 COMPLETE CBC W/AUTO DIFF WBC: CPT

## 2022-11-07 PROCEDURE — 83970 ASSAY OF PARATHORMONE: CPT

## 2022-11-07 PROCEDURE — 36415 COLL VENOUS BLD VENIPUNCTURE: CPT

## 2022-11-11 ENCOUNTER — APPOINTMENT (OUTPATIENT)
Dept: CT IMAGING | Facility: HOSPITAL | Age: 62
End: 2022-11-11

## 2022-11-11 ENCOUNTER — HOSPITAL ENCOUNTER (EMERGENCY)
Facility: HOSPITAL | Age: 62
Discharge: HOME | End: 2022-11-11
Attending: EMERGENCY MEDICINE | Admitting: EMERGENCY MEDICINE

## 2022-11-11 ENCOUNTER — APPOINTMENT (OUTPATIENT)
Dept: GENERAL RADIOLOGY | Facility: HOSPITAL | Age: 62
End: 2022-11-11

## 2022-11-11 VITALS
HEIGHT: 65 IN | SYSTOLIC BLOOD PRESSURE: 135 MMHG | HEART RATE: 78 BPM | TEMPERATURE: 96.9 F | WEIGHT: 88 LBS | OXYGEN SATURATION: 100 % | RESPIRATION RATE: 18 BRPM | BODY MASS INDEX: 14.66 KG/M2 | DIASTOLIC BLOOD PRESSURE: 74 MMHG

## 2022-11-11 DIAGNOSIS — N17.1 ACUTE RENAL FAILURE WITH ACUTE RENAL CORTICAL NECROSIS SUPERIMPOSED ON CHRONIC KIDNEY DISEASE, ON CHRONIC DIALYSIS: ICD-10-CM

## 2022-11-11 DIAGNOSIS — I10 PRIMARY HYPERTENSION: Primary | ICD-10-CM

## 2022-11-11 DIAGNOSIS — Z99.2 ACUTE RENAL FAILURE WITH ACUTE RENAL CORTICAL NECROSIS SUPERIMPOSED ON CHRONIC KIDNEY DISEASE, ON CHRONIC DIALYSIS: ICD-10-CM

## 2022-11-11 DIAGNOSIS — N18.9 ACUTE RENAL FAILURE WITH ACUTE RENAL CORTICAL NECROSIS SUPERIMPOSED ON CHRONIC KIDNEY DISEASE, ON CHRONIC DIALYSIS: ICD-10-CM

## 2022-11-11 LAB
ALBUMIN SERPL-MCNC: 3.37 G/DL (ref 3.5–5.2)
ALBUMIN/GLOB SERPL: 1.4 G/DL
ALP SERPL-CCNC: 100 U/L (ref 39–117)
ALT SERPL W P-5'-P-CCNC: 34 U/L (ref 1–33)
ANION GAP SERPL CALCULATED.3IONS-SCNC: 12.4 MMOL/L (ref 5–15)
APTT PPP: 24.4 SECONDS (ref 26.5–34.5)
AST SERPL-CCNC: 17 U/L (ref 1–32)
BASOPHILS # BLD AUTO: 0.03 10*3/MM3 (ref 0–0.2)
BASOPHILS NFR BLD AUTO: 0.8 % (ref 0–1.5)
BILIRUB SERPL-MCNC: 0.4 MG/DL (ref 0–1.2)
BUN SERPL-MCNC: 23 MG/DL (ref 8–23)
BUN/CREAT SERPL: 7.1 (ref 7–25)
CALCIUM SPEC-SCNC: 8.3 MG/DL (ref 8.6–10.5)
CHLORIDE SERPL-SCNC: 98 MMOL/L (ref 98–107)
CO2 SERPL-SCNC: 23.6 MMOL/L (ref 22–29)
CREAT SERPL-MCNC: 3.24 MG/DL (ref 0.57–1)
CRP SERPL-MCNC: 0.34 MG/DL (ref 0–0.5)
D-LACTATE SERPL-SCNC: 0.7 MMOL/L (ref 0.5–2)
DEPRECATED RDW RBC AUTO: 44.4 FL (ref 37–54)
EGFRCR SERPLBLD CKD-EPI 2021: 15.4 ML/MIN/1.73
EOSINOPHIL # BLD AUTO: 0.08 10*3/MM3 (ref 0–0.4)
EOSINOPHIL NFR BLD AUTO: 2.2 % (ref 0.3–6.2)
ERYTHROCYTE [DISTWIDTH] IN BLOOD BY AUTOMATED COUNT: 13.3 % (ref 12.3–15.4)
FLUAV RNA RESP QL NAA+PROBE: NOT DETECTED
FLUBV RNA RESP QL NAA+PROBE: NOT DETECTED
GLOBULIN UR ELPH-MCNC: 2.4 GM/DL
GLUCOSE SERPL-MCNC: 351 MG/DL (ref 65–99)
HCT VFR BLD AUTO: 27.7 % (ref 34–46.6)
HGB BLD-MCNC: 9.6 G/DL (ref 12–15.9)
IMM GRANULOCYTES # BLD AUTO: 0.01 10*3/MM3 (ref 0–0.05)
IMM GRANULOCYTES NFR BLD AUTO: 0.3 % (ref 0–0.5)
INR PPP: 0.88 (ref 0.9–1.1)
LYMPHOCYTES # BLD AUTO: 1.25 10*3/MM3 (ref 0.7–3.1)
LYMPHOCYTES NFR BLD AUTO: 33.6 % (ref 19.6–45.3)
MAGNESIUM SERPL-MCNC: 1.7 MG/DL (ref 1.6–2.4)
MCH RBC QN AUTO: 31.7 PG (ref 26.6–33)
MCHC RBC AUTO-ENTMCNC: 34.7 G/DL (ref 31.5–35.7)
MCV RBC AUTO: 91.4 FL (ref 79–97)
MONOCYTES # BLD AUTO: 0.16 10*3/MM3 (ref 0.1–0.9)
MONOCYTES NFR BLD AUTO: 4.3 % (ref 5–12)
NEUTROPHILS NFR BLD AUTO: 2.19 10*3/MM3 (ref 1.7–7)
NEUTROPHILS NFR BLD AUTO: 58.8 % (ref 42.7–76)
NRBC BLD AUTO-RTO: 0 /100 WBC (ref 0–0.2)
NT-PROBNP SERPL-MCNC: ABNORMAL PG/ML (ref 0–900)
PHOSPHATE SERPL-MCNC: 4.9 MG/DL (ref 2.5–4.5)
PLATELET # BLD AUTO: 149 10*3/MM3 (ref 140–450)
PMV BLD AUTO: 9.3 FL (ref 6–12)
POTASSIUM SERPL-SCNC: 4.6 MMOL/L (ref 3.5–5.2)
PROT SERPL-MCNC: 5.8 G/DL (ref 6–8.5)
PROTHROMBIN TIME: 12.1 SECONDS (ref 12.1–14.7)
RBC # BLD AUTO: 3.03 10*6/MM3 (ref 3.77–5.28)
SARS-COV-2 RNA RESP QL NAA+PROBE: NOT DETECTED
SODIUM SERPL-SCNC: 134 MMOL/L (ref 136–145)
TROPONIN T SERPL-MCNC: 0.05 NG/ML (ref 0–0.03)
TSH SERPL DL<=0.05 MIU/L-ACNC: 133.2 UIU/ML (ref 0.27–4.2)
WBC NRBC COR # BLD: 3.72 10*3/MM3 (ref 3.4–10.8)

## 2022-11-11 PROCEDURE — 70450 CT HEAD/BRAIN W/O DYE: CPT

## 2022-11-11 PROCEDURE — 85610 PROTHROMBIN TIME: CPT | Performed by: STUDENT IN AN ORGANIZED HEALTH CARE EDUCATION/TRAINING PROGRAM

## 2022-11-11 PROCEDURE — 83735 ASSAY OF MAGNESIUM: CPT | Performed by: STUDENT IN AN ORGANIZED HEALTH CARE EDUCATION/TRAINING PROGRAM

## 2022-11-11 PROCEDURE — 36415 COLL VENOUS BLD VENIPUNCTURE: CPT

## 2022-11-11 PROCEDURE — 84100 ASSAY OF PHOSPHORUS: CPT | Performed by: STUDENT IN AN ORGANIZED HEALTH CARE EDUCATION/TRAINING PROGRAM

## 2022-11-11 PROCEDURE — 83880 ASSAY OF NATRIURETIC PEPTIDE: CPT | Performed by: STUDENT IN AN ORGANIZED HEALTH CARE EDUCATION/TRAINING PROGRAM

## 2022-11-11 PROCEDURE — 96375 TX/PRO/DX INJ NEW DRUG ADDON: CPT

## 2022-11-11 PROCEDURE — 96374 THER/PROPH/DIAG INJ IV PUSH: CPT

## 2022-11-11 PROCEDURE — 86140 C-REACTIVE PROTEIN: CPT | Performed by: STUDENT IN AN ORGANIZED HEALTH CARE EDUCATION/TRAINING PROGRAM

## 2022-11-11 PROCEDURE — 84484 ASSAY OF TROPONIN QUANT: CPT | Performed by: STUDENT IN AN ORGANIZED HEALTH CARE EDUCATION/TRAINING PROGRAM

## 2022-11-11 PROCEDURE — 71046 X-RAY EXAM CHEST 2 VIEWS: CPT | Performed by: RADIOLOGY

## 2022-11-11 PROCEDURE — 84443 ASSAY THYROID STIM HORMONE: CPT | Performed by: STUDENT IN AN ORGANIZED HEALTH CARE EDUCATION/TRAINING PROGRAM

## 2022-11-11 PROCEDURE — 71046 X-RAY EXAM CHEST 2 VIEWS: CPT

## 2022-11-11 PROCEDURE — 87636 SARSCOV2 & INF A&B AMP PRB: CPT | Performed by: STUDENT IN AN ORGANIZED HEALTH CARE EDUCATION/TRAINING PROGRAM

## 2022-11-11 PROCEDURE — 80053 COMPREHEN METABOLIC PANEL: CPT | Performed by: STUDENT IN AN ORGANIZED HEALTH CARE EDUCATION/TRAINING PROGRAM

## 2022-11-11 PROCEDURE — 25010000002 ONDANSETRON PER 1 MG: Performed by: EMERGENCY MEDICINE

## 2022-11-11 PROCEDURE — 85025 COMPLETE CBC W/AUTO DIFF WBC: CPT | Performed by: STUDENT IN AN ORGANIZED HEALTH CARE EDUCATION/TRAINING PROGRAM

## 2022-11-11 PROCEDURE — 25010000002 LORAZEPAM PER 2 MG: Performed by: EMERGENCY MEDICINE

## 2022-11-11 PROCEDURE — 83605 ASSAY OF LACTIC ACID: CPT | Performed by: STUDENT IN AN ORGANIZED HEALTH CARE EDUCATION/TRAINING PROGRAM

## 2022-11-11 PROCEDURE — 70450 CT HEAD/BRAIN W/O DYE: CPT | Performed by: RADIOLOGY

## 2022-11-11 PROCEDURE — 25010000002 HYDRALAZINE PER 20 MG: Performed by: EMERGENCY MEDICINE

## 2022-11-11 PROCEDURE — 99284 EMERGENCY DEPT VISIT MOD MDM: CPT

## 2022-11-11 PROCEDURE — 85730 THROMBOPLASTIN TIME PARTIAL: CPT | Performed by: STUDENT IN AN ORGANIZED HEALTH CARE EDUCATION/TRAINING PROGRAM

## 2022-11-11 RX ORDER — HYDRALAZINE HYDROCHLORIDE 20 MG/ML
20 INJECTION INTRAMUSCULAR; INTRAVENOUS ONCE
Status: COMPLETED | OUTPATIENT
Start: 2022-11-11 | End: 2022-11-11

## 2022-11-11 RX ORDER — LORAZEPAM 2 MG/ML
1 INJECTION INTRAMUSCULAR ONCE
Status: COMPLETED | OUTPATIENT
Start: 2022-11-11 | End: 2022-11-11

## 2022-11-11 RX ORDER — CLONIDINE HYDROCHLORIDE 0.1 MG/1
0.1 TABLET ORAL ONCE
Status: DISCONTINUED | OUTPATIENT
Start: 2022-11-11 | End: 2022-11-11 | Stop reason: HOSPADM

## 2022-11-11 RX ORDER — CLONIDINE HYDROCHLORIDE 0.1 MG/1
0.2 TABLET ORAL ONCE
Status: COMPLETED | OUTPATIENT
Start: 2022-11-11 | End: 2022-11-11

## 2022-11-11 RX ORDER — ONDANSETRON 2 MG/ML
4 INJECTION INTRAMUSCULAR; INTRAVENOUS ONCE
Status: COMPLETED | OUTPATIENT
Start: 2022-11-11 | End: 2022-11-11

## 2022-11-11 RX ORDER — SODIUM CHLORIDE 0.9 % (FLUSH) 0.9 %
10 SYRINGE (ML) INJECTION AS NEEDED
Status: DISCONTINUED | OUTPATIENT
Start: 2022-11-11 | End: 2022-11-11 | Stop reason: HOSPADM

## 2022-11-11 RX ADMIN — ONDANSETRON 4 MG: 2 INJECTION INTRAMUSCULAR; INTRAVENOUS at 17:52

## 2022-11-11 RX ADMIN — CLONIDINE HYDROCHLORIDE 0.2 MG: 0.1 TABLET ORAL at 14:21

## 2022-11-11 RX ADMIN — LORAZEPAM 1 MG: 2 INJECTION INTRAMUSCULAR; INTRAVENOUS at 15:05

## 2022-11-11 RX ADMIN — HYDRALAZINE HYDROCHLORIDE 20 MG: 20 INJECTION INTRAMUSCULAR; INTRAVENOUS at 14:37

## 2022-11-11 NOTE — ED NOTES
MEDICAL SCREENING:    Reason for Visit: On peritoneal dialysis here for confusion shaking    Patient initially seen in triage.  The patient was advised further evaluation and diagnostic testing will be needed, some of the treatment and testing will be initiated in the lobby in order to begin the process.  The patient will be returned to the waiting area for the time being and possibly be re-assessed by a subsequent ED provider.  The patient will be brought back to the treatment area in as timely manner as possible.       Helder Barros MD  11/11/22 8984

## 2022-11-12 NOTE — ED PROVIDER NOTES
Subjective   History of Present Illness  Patient presents to ER with severe hypertension, and throbbing headache    Hypertension  Severity:  Severe  Onset quality:  Gradual  Timing:  Constant  Chronicity:  Chronic  Context comment:  Kidney failure  Relieved by:  Nothing  Worsened by:  Nothing  Ineffective treatments:  Angiotensin blockers and beta blockers  Associated symptoms: dizziness and fatigue        Review of Systems   Constitutional: Positive for activity change and fatigue.   HENT: Negative.    Eyes: Negative.    Respiratory: Negative.    Cardiovascular: Negative.    Gastrointestinal: Negative.    Endocrine: Negative.    Genitourinary: Negative.    Musculoskeletal: Negative.    Skin: Negative.    Allergic/Immunologic: Negative.    Neurological: Positive for dizziness.   Hematological: Negative.        Past Medical History:   Diagnosis Date   • Arthritis    • Closed fracture of right fibula and tibia 12/25/2018   • Depression    • Diabetic ulcer of left foot associated with type 2 diabetes mellitus ([facility]) 6/23/2017   • Diastolic dysfunction    • Disease of thyroid gland    • Elevated cholesterol    • End stage renal disease ([facility])    • Essential hypertension    • GERD (gastroesophageal reflux disease)    • History of transfusion    • Hyperlipidemia    • Iron deficiency anemia 12/30/2018   • PAD (peripheral artery disease) ([facility])    • Renal failure    • Type 2 diabetes mellitus with hyperglycemia, with long-term current use of insulin ([facility]) 12/30/2018       Allergies   Allergen Reactions   • Penicillins Hives and GI Intolerance     Patient has received cefazolin, ceftriaxone and cefepime during past admissions.   • Codeine Hives, Rash and GI Intolerance     Pt tolerates Norco @ home   • Sulfa Antibiotics Hives, Rash and GI Intolerance     NAUSEA TOO       Past Surgical History:   Procedure Laterality Date   • ABDOMINAL SURGERY     • BACK SURGERY      Post spinal diskectomy, osteophytectomy in one lumbar  interspace   • CATARACT EXTRACTION      Left    •  SECTION     • DENTAL PROCEDURE     • ENDOSCOPY     • FRACTURE SURGERY      right leg   • HYSTERECTOMY     • INCISION AND DRAINAGE LEG Left 4/15/2017    Procedure: INCISION AND DRAINAGE LOWER EXTREMITY;  Surgeon: Basilio Morris MD;  Location: [facility] OR;  Service:    • INCISION AND DRAINAGE LEG Left 2017    Procedure: Irrigation and Debridment abcess diabetic wound left foot with deep culture;  Surgeon: Basilio Morris MD;  Location: [facility] OR;  Service:    • INSERTION PERITONEAL DIALYSIS CATHETER N/A 2021    Procedure: INSERTION PERITONEAL DIALYSIS CATHETER LAPAROSCOPIC;  Surgeon: Edy Glover MD;  Location: [facility] OR;  Service: General;  Laterality: N/A;   • KNEE ARTHROSCOPY Right    • ORIF TIBIA FRACTURE Right 2018    Procedure: TIBIAL  FRACTURE OPEN REDUCTION INTERNAL FIXATION;  Surgeon: Jung Barragan MD;  Location: [facility] OR;  Service: Orthopedics   • TOE AMPUTATION Right     5th   • TUBAL ABDOMINAL LIGATION         Family History   Problem Relation Age of Onset   • Heart disease Mother    • Cancer Mother    • COPD Mother    • Diabetes Other    • Depression Other        Social History     Socioeconomic History   • Marital status:    Tobacco Use   • Smoking status: Never   • Smokeless tobacco: Never   Vaping Use   • Vaping Use: Never used   Substance and Sexual Activity   • Alcohol use: No   • Drug use: No   • Sexual activity: Defer           Objective   Physical Exam  Vitals and nursing note reviewed.   HENT:      Head: Normocephalic.      Mouth/Throat:      Mouth: Mucous membranes are moist.   Eyes:      Extraocular Movements: Extraocular movements intact.   Cardiovascular:      Rate and Rhythm: Normal rate.      Heart sounds: Normal heart sounds.   Pulmonary:      Effort: Pulmonary effort is normal.   Abdominal:      Palpations: Abdomen is soft.   Musculoskeletal:         General: Normal range of motion.      Cervical  back: Normal range of motion.   Skin:     General: Skin is dry.      Coloration: Skin is pale.   Neurological:      Mental Status: She is alert.   Psychiatric:         Mood and Affect: Mood normal.         Speech: Speech normal.         Procedures           ED Course  ED Course as of 11/11/22 2355 Fri Nov 11, 2022 1945 cT head : stable [RADHA]   1945 cXR : negative [RADHA]      ED Course User Index  [RADHA] González Perla MD                                           [facility]    Final diagnoses:   Primary hypertension   Acute renal failure with acute renal cortical necrosis superimposed on chronic kidney disease, on chronic dialysis (HCC)       ED Disposition  ED Disposition     ED Disposition   Discharge    Condition   Stable    Comment   --             Susan Stephens, APRN  [address] [address]  [phone]    Schedule an appointment as soon as possible for a visit   If symptoms worsen         Medication List      No changes were made to your prescriptions during this visit.          González Perla MD  11/11/22 6404

## 2022-11-20 ENCOUNTER — APPOINTMENT (OUTPATIENT)
Dept: GENERAL RADIOLOGY | Facility: HOSPITAL | Age: 62
End: 2022-11-20

## 2022-11-20 ENCOUNTER — APPOINTMENT (OUTPATIENT)
Dept: CT IMAGING | Facility: HOSPITAL | Age: 62
End: 2022-11-20

## 2022-11-20 ENCOUNTER — APPOINTMENT (OUTPATIENT)
Dept: MRI IMAGING | Facility: HOSPITAL | Age: 62
End: 2022-11-20

## 2022-11-20 ENCOUNTER — HOSPITAL ENCOUNTER (INPATIENT)
Facility: HOSPITAL | Age: 62
LOS: 4 days | Discharge: HOME | End: 2022-11-24
Attending: EMERGENCY MEDICINE | Admitting: INTERNAL MEDICINE

## 2022-11-20 DIAGNOSIS — I67.83 PRES (POSTERIOR REVERSIBLE ENCEPHALOPATHY SYNDROME): ICD-10-CM

## 2022-11-20 DIAGNOSIS — I16.1 HYPERTENSIVE EMERGENCY: Primary | ICD-10-CM

## 2022-11-20 LAB
ALBUMIN SERPL-MCNC: 3.25 G/DL (ref 3.5–5.2)
ALBUMIN SERPL-MCNC: 3.57 G/DL (ref 3.5–5.2)
ALBUMIN/GLOB SERPL: 1.3 G/DL
ALBUMIN/GLOB SERPL: 1.4 G/DL
ALP SERPL-CCNC: 115 U/L (ref 39–117)
ALP SERPL-CCNC: 91 U/L (ref 39–117)
ALT SERPL W P-5'-P-CCNC: 18 U/L (ref 1–33)
ALT SERPL W P-5'-P-CCNC: 19 U/L (ref 1–33)
ANION GAP SERPL CALCULATED.3IONS-SCNC: 14.7 MMOL/L (ref 5–15)
ANION GAP SERPL CALCULATED.3IONS-SCNC: 15.1 MMOL/L (ref 5–15)
APTT PPP: 25 SECONDS (ref 26.5–34.5)
AST SERPL-CCNC: 20 U/L (ref 1–32)
AST SERPL-CCNC: 23 U/L (ref 1–32)
BASOPHILS # BLD AUTO: 0.04 10*3/MM3 (ref 0–0.2)
BASOPHILS NFR BLD AUTO: 1 % (ref 0–1.5)
BILIRUB SERPL-MCNC: 0.4 MG/DL (ref 0–1.2)
BILIRUB SERPL-MCNC: 0.5 MG/DL (ref 0–1.2)
BUN SERPL-MCNC: 20 MG/DL (ref 8–23)
BUN SERPL-MCNC: 22 MG/DL (ref 8–23)
BUN/CREAT SERPL: 6.8 (ref 7–25)
BUN/CREAT SERPL: 7.6 (ref 7–25)
CALCIUM SPEC-SCNC: 7.9 MG/DL (ref 8.6–10.5)
CALCIUM SPEC-SCNC: 8.2 MG/DL (ref 8.6–10.5)
CHLORIDE SERPL-SCNC: 95 MMOL/L (ref 98–107)
CHLORIDE SERPL-SCNC: 98 MMOL/L (ref 98–107)
CO2 SERPL-SCNC: 21.3 MMOL/L (ref 22–29)
CO2 SERPL-SCNC: 22.9 MMOL/L (ref 22–29)
CREAT SERPL-MCNC: 2.88 MG/DL (ref 0.57–1)
CREAT SERPL-MCNC: 2.93 MG/DL (ref 0.57–1)
CRP SERPL-MCNC: <0.3 MG/DL (ref 0–0.5)
DEPRECATED RDW RBC AUTO: 42.1 FL (ref 37–54)
DEPRECATED RDW RBC AUTO: 42.6 FL (ref 37–54)
EGFRCR SERPLBLD CKD-EPI 2021: 17.4 ML/MIN/1.73
EGFRCR SERPLBLD CKD-EPI 2021: 17.7 ML/MIN/1.73
EOSINOPHIL # BLD AUTO: 0.12 10*3/MM3 (ref 0–0.4)
EOSINOPHIL NFR BLD AUTO: 3 % (ref 0.3–6.2)
ERYTHROCYTE [DISTWIDTH] IN BLOOD BY AUTOMATED COUNT: 12.9 % (ref 12.3–15.4)
ERYTHROCYTE [DISTWIDTH] IN BLOOD BY AUTOMATED COUNT: 13.1 % (ref 12.3–15.4)
FLUAV RNA RESP QL NAA+PROBE: NOT DETECTED
FLUBV RNA RESP QL NAA+PROBE: NOT DETECTED
GLOBULIN UR ELPH-MCNC: 2.5 GM/DL
GLOBULIN UR ELPH-MCNC: 2.6 GM/DL
GLUCOSE BLDC GLUCOMTR-MCNC: 143 MG/DL (ref 70–130)
GLUCOSE BLDC GLUCOMTR-MCNC: 300 MG/DL (ref 70–130)
GLUCOSE BLDC GLUCOMTR-MCNC: 412 MG/DL (ref 70–130)
GLUCOSE BLDC GLUCOMTR-MCNC: 457 MG/DL (ref 70–130)
GLUCOSE SERPL-MCNC: 298 MG/DL (ref 65–99)
GLUCOSE SERPL-MCNC: 484 MG/DL (ref 65–99)
HCT VFR BLD AUTO: 26.3 % (ref 34–46.6)
HCT VFR BLD AUTO: 29.2 % (ref 34–46.6)
HGB BLD-MCNC: 10.1 G/DL (ref 12–15.9)
HGB BLD-MCNC: 9.2 G/DL (ref 12–15.9)
HOLD SPECIMEN: NORMAL
HOLD SPECIMEN: NORMAL
IMM GRANULOCYTES # BLD AUTO: 0.01 10*3/MM3 (ref 0–0.05)
IMM GRANULOCYTES NFR BLD AUTO: 0.3 % (ref 0–0.5)
INR PPP: 0.86 (ref 0.9–1.1)
LYMPHOCYTES # BLD AUTO: 1.28 10*3/MM3 (ref 0.7–3.1)
LYMPHOCYTES NFR BLD AUTO: 32.1 % (ref 19.6–45.3)
MAGNESIUM SERPL-MCNC: 1.8 MG/DL (ref 1.6–2.4)
MCH RBC QN AUTO: 30.8 PG (ref 26.6–33)
MCH RBC QN AUTO: 31.3 PG (ref 26.6–33)
MCHC RBC AUTO-ENTMCNC: 34.6 G/DL (ref 31.5–35.7)
MCHC RBC AUTO-ENTMCNC: 35 G/DL (ref 31.5–35.7)
MCV RBC AUTO: 89 FL (ref 79–97)
MCV RBC AUTO: 89.5 FL (ref 79–97)
MONOCYTES # BLD AUTO: 0.19 10*3/MM3 (ref 0.1–0.9)
MONOCYTES NFR BLD AUTO: 4.8 % (ref 5–12)
NEUTROPHILS NFR BLD AUTO: 2.35 10*3/MM3 (ref 1.7–7)
NEUTROPHILS NFR BLD AUTO: 58.8 % (ref 42.7–76)
NRBC BLD AUTO-RTO: 0 /100 WBC (ref 0–0.2)
PLATELET # BLD AUTO: 145 10*3/MM3 (ref 140–450)
PLATELET # BLD AUTO: 176 10*3/MM3 (ref 140–450)
PMV BLD AUTO: 9.3 FL (ref 6–12)
PMV BLD AUTO: 9.9 FL (ref 6–12)
POTASSIUM SERPL-SCNC: 3 MMOL/L (ref 3.5–5.2)
POTASSIUM SERPL-SCNC: 3.3 MMOL/L (ref 3.5–5.2)
PROCALCITONIN SERPL-MCNC: 0.22 NG/ML (ref 0–0.25)
PROT SERPL-MCNC: 5.7 G/DL (ref 6–8.5)
PROT SERPL-MCNC: 6.2 G/DL (ref 6–8.5)
PROTHROMBIN TIME: 11.9 SECONDS (ref 12.1–14.7)
RBC # BLD AUTO: 2.94 10*6/MM3 (ref 3.77–5.28)
RBC # BLD AUTO: 3.28 10*6/MM3 (ref 3.77–5.28)
SARS-COV-2 RNA RESP QL NAA+PROBE: NOT DETECTED
SODIUM SERPL-SCNC: 133 MMOL/L (ref 136–145)
SODIUM SERPL-SCNC: 134 MMOL/L (ref 136–145)
T4 FREE SERPL-MCNC: 0.62 NG/DL (ref 0.93–1.7)
TROPONIN T SERPL-MCNC: 0.05 NG/ML (ref 0–0.03)
TSH SERPL DL<=0.05 MIU/L-ACNC: 127.8 UIU/ML (ref 0.27–4.2)
WBC NRBC COR # BLD: 14 10*3/MM3 (ref 3.4–10.8)
WBC NRBC COR # BLD: 3.99 10*3/MM3 (ref 3.4–10.8)
WHOLE BLOOD HOLD COAG: NORMAL
WHOLE BLOOD HOLD SPECIMEN: NORMAL

## 2022-11-20 PROCEDURE — 63710000001 INSULIN ASPART PER 5 UNITS: Performed by: INTERNAL MEDICINE

## 2022-11-20 PROCEDURE — 93005 ELECTROCARDIOGRAM TRACING: CPT | Performed by: EMERGENCY MEDICINE

## 2022-11-20 PROCEDURE — 70496 CT ANGIOGRAPHY HEAD: CPT

## 2022-11-20 PROCEDURE — 82962 GLUCOSE BLOOD TEST: CPT

## 2022-11-20 PROCEDURE — 82607 VITAMIN B-12: CPT | Performed by: NURSE PRACTITIONER

## 2022-11-20 PROCEDURE — 63710000001 INSULIN REGULAR HUMAN PER 5 UNITS: Performed by: EMERGENCY MEDICINE

## 2022-11-20 PROCEDURE — 82746 ASSAY OF FOLIC ACID SERUM: CPT | Performed by: NURSE PRACTITIONER

## 2022-11-20 PROCEDURE — 83735 ASSAY OF MAGNESIUM: CPT | Performed by: EMERGENCY MEDICINE

## 2022-11-20 PROCEDURE — 84484 ASSAY OF TROPONIN QUANT: CPT | Performed by: EMERGENCY MEDICINE

## 2022-11-20 PROCEDURE — 25010000002 HYDRALAZINE PER 20 MG: Performed by: EMERGENCY MEDICINE

## 2022-11-20 PROCEDURE — 25010000002 HYDROMORPHONE PER 4 MG: Performed by: EMERGENCY MEDICINE

## 2022-11-20 PROCEDURE — 84443 ASSAY THYROID STIM HORMONE: CPT | Performed by: EMERGENCY MEDICINE

## 2022-11-20 PROCEDURE — 80053 COMPREHEN METABOLIC PANEL: CPT | Performed by: INTERNAL MEDICINE

## 2022-11-20 PROCEDURE — 84145 PROCALCITONIN (PCT): CPT | Performed by: INTERNAL MEDICINE

## 2022-11-20 PROCEDURE — 25010000002 ONDANSETRON PER 1 MG: Performed by: EMERGENCY MEDICINE

## 2022-11-20 PROCEDURE — 80053 COMPREHEN METABOLIC PANEL: CPT | Performed by: EMERGENCY MEDICINE

## 2022-11-20 PROCEDURE — 85025 COMPLETE CBC W/AUTO DIFF WBC: CPT | Performed by: EMERGENCY MEDICINE

## 2022-11-20 PROCEDURE — 82306 VITAMIN D 25 HYDROXY: CPT | Performed by: NURSE PRACTITIONER

## 2022-11-20 PROCEDURE — 25010000002 LEVETRIRACETAM PER 10 MG: Performed by: EMERGENCY MEDICINE

## 2022-11-20 PROCEDURE — 84439 ASSAY OF FREE THYROXINE: CPT | Performed by: EMERGENCY MEDICINE

## 2022-11-20 PROCEDURE — 70551 MRI BRAIN STEM W/O DYE: CPT

## 2022-11-20 PROCEDURE — 99285 EMERGENCY DEPT VISIT HI MDM: CPT

## 2022-11-20 PROCEDURE — 99223 1ST HOSP IP/OBS HIGH 75: CPT | Performed by: INTERNAL MEDICINE

## 2022-11-20 PROCEDURE — 85610 PROTHROMBIN TIME: CPT | Performed by: EMERGENCY MEDICINE

## 2022-11-20 PROCEDURE — 70498 CT ANGIOGRAPHY NECK: CPT

## 2022-11-20 PROCEDURE — 71045 X-RAY EXAM CHEST 1 VIEW: CPT

## 2022-11-20 PROCEDURE — 87636 SARSCOV2 & INF A&B AMP PRB: CPT | Performed by: EMERGENCY MEDICINE

## 2022-11-20 PROCEDURE — 85027 COMPLETE CBC AUTOMATED: CPT | Performed by: INTERNAL MEDICINE

## 2022-11-20 PROCEDURE — 0042T HC CT CEREBRAL PERFUSION W/WO CONTRAST: CPT

## 2022-11-20 PROCEDURE — 36415 COLL VENOUS BLD VENIPUNCTURE: CPT

## 2022-11-20 PROCEDURE — 85730 THROMBOPLASTIN TIME PARTIAL: CPT | Performed by: EMERGENCY MEDICINE

## 2022-11-20 PROCEDURE — 86140 C-REACTIVE PROTEIN: CPT | Performed by: INTERNAL MEDICINE

## 2022-11-20 PROCEDURE — 0 IOPAMIDOL PER 1 ML: Performed by: EMERGENCY MEDICINE

## 2022-11-20 PROCEDURE — 25010000002 LEVETIRACETAM IN NACL 0.82% 500 MG/100ML SOLUTION: Performed by: INTERNAL MEDICINE

## 2022-11-20 PROCEDURE — 70450 CT HEAD/BRAIN W/O DYE: CPT

## 2022-11-20 PROCEDURE — 25010000002 HYDROMORPHONE 1 MG/ML SOLUTION: Performed by: INTERNAL MEDICINE

## 2022-11-20 RX ORDER — SODIUM CHLORIDE 0.9 % (FLUSH) 0.9 %
10 SYRINGE (ML) INJECTION AS NEEDED
Status: DISCONTINUED | OUTPATIENT
Start: 2022-11-20 | End: 2022-11-24 | Stop reason: HOSPADM

## 2022-11-20 RX ORDER — ACETAMINOPHEN 325 MG/1
650 TABLET ORAL EVERY 4 HOURS PRN
Status: DISCONTINUED | OUTPATIENT
Start: 2022-11-20 | End: 2022-11-24 | Stop reason: HOSPADM

## 2022-11-20 RX ORDER — NICOTINE POLACRILEX 4 MG
15 LOZENGE BUCCAL
Status: DISCONTINUED | OUTPATIENT
Start: 2022-11-20 | End: 2022-11-24 | Stop reason: HOSPADM

## 2022-11-20 RX ORDER — INSULIN ASPART 100 [IU]/ML
0-7 INJECTION, SOLUTION INTRAVENOUS; SUBCUTANEOUS
Status: CANCELLED | OUTPATIENT
Start: 2022-11-20

## 2022-11-20 RX ORDER — HYDROMORPHONE HYDROCHLORIDE 1 MG/ML
0.25 INJECTION, SOLUTION INTRAMUSCULAR; INTRAVENOUS; SUBCUTANEOUS ONCE
Status: COMPLETED | OUTPATIENT
Start: 2022-11-20 | End: 2022-11-20

## 2022-11-20 RX ORDER — DEXTROSE MONOHYDRATE 25 G/50ML
25 INJECTION, SOLUTION INTRAVENOUS
Status: DISCONTINUED | OUTPATIENT
Start: 2022-11-20 | End: 2022-11-24 | Stop reason: HOSPADM

## 2022-11-20 RX ORDER — ACETAMINOPHEN 650 MG/1
650 SUPPOSITORY RECTAL EVERY 4 HOURS PRN
Status: DISCONTINUED | OUTPATIENT
Start: 2022-11-20 | End: 2022-11-24 | Stop reason: HOSPADM

## 2022-11-20 RX ORDER — LEVETIRACETAM 5 MG/ML
500 INJECTION INTRAVASCULAR DAILY
Status: DISCONTINUED | OUTPATIENT
Start: 2022-11-22 | End: 2022-11-21

## 2022-11-20 RX ORDER — LEVETIRACETAM 5 MG/ML
500 INJECTION INTRAVASCULAR NIGHTLY
Status: DISCONTINUED | OUTPATIENT
Start: 2022-11-20 | End: 2022-11-20

## 2022-11-20 RX ORDER — LEVOTHYROXINE SODIUM ANHYDROUS 100 UG/5ML
50 INJECTION, POWDER, LYOPHILIZED, FOR SOLUTION INTRAVENOUS ONCE
Status: COMPLETED | OUTPATIENT
Start: 2022-11-20 | End: 2022-11-20

## 2022-11-20 RX ORDER — HYDRALAZINE HYDROCHLORIDE 20 MG/ML
20 INJECTION INTRAMUSCULAR; INTRAVENOUS ONCE
Status: COMPLETED | OUTPATIENT
Start: 2022-11-20 | End: 2022-11-20

## 2022-11-20 RX ORDER — SODIUM CHLORIDE 0.9 % (FLUSH) 0.9 %
10 SYRINGE (ML) INJECTION EVERY 12 HOURS SCHEDULED
Status: DISCONTINUED | OUTPATIENT
Start: 2022-11-20 | End: 2022-11-24 | Stop reason: HOSPADM

## 2022-11-20 RX ORDER — INSULIN ASPART 100 [IU]/ML
0-9 INJECTION, SOLUTION INTRAVENOUS; SUBCUTANEOUS EVERY 6 HOURS
Status: DISCONTINUED | OUTPATIENT
Start: 2022-11-20 | End: 2022-11-21 | Stop reason: ALTCHOICE

## 2022-11-20 RX ORDER — ONDANSETRON 2 MG/ML
4 INJECTION INTRAMUSCULAR; INTRAVENOUS ONCE
Status: COMPLETED | OUTPATIENT
Start: 2022-11-20 | End: 2022-11-20

## 2022-11-20 RX ORDER — SODIUM CHLORIDE 9 MG/ML
40 INJECTION, SOLUTION INTRAVENOUS AS NEEDED
Status: DISCONTINUED | OUTPATIENT
Start: 2022-11-20 | End: 2022-11-24 | Stop reason: HOSPADM

## 2022-11-20 RX ADMIN — SODIUM CHLORIDE 2.5 MG/HR: 9 INJECTION, SOLUTION INTRAVENOUS at 18:04

## 2022-11-20 RX ADMIN — HUMAN INSULIN 5 UNITS: 100 INJECTION, SOLUTION SUBCUTANEOUS at 15:54

## 2022-11-20 RX ADMIN — HYDRALAZINE HYDROCHLORIDE 20 MG: 20 INJECTION INTRAMUSCULAR; INTRAVENOUS at 13:54

## 2022-11-20 RX ADMIN — LEVETIRACETAM 250 MG: 100 INJECTION INTRAVENOUS at 15:54

## 2022-11-20 RX ADMIN — HYDROMORPHONE HYDROCHLORIDE 1 MG: 1 INJECTION, SOLUTION INTRAMUSCULAR; INTRAVENOUS; SUBCUTANEOUS at 16:51

## 2022-11-20 RX ADMIN — HYDROMORPHONE HYDROCHLORIDE 1 MG: 1 INJECTION, SOLUTION INTRAMUSCULAR; INTRAVENOUS; SUBCUTANEOUS at 18:37

## 2022-11-20 RX ADMIN — SODIUM CHLORIDE 500 ML: 9 INJECTION, SOLUTION INTRAVENOUS at 16:05

## 2022-11-20 RX ADMIN — ONDANSETRON 4 MG: 2 INJECTION INTRAMUSCULAR; INTRAVENOUS at 15:54

## 2022-11-20 RX ADMIN — HYDROMORPHONE HYDROCHLORIDE 0.25 MG: 1 INJECTION, SOLUTION INTRAMUSCULAR; INTRAVENOUS; SUBCUTANEOUS at 15:53

## 2022-11-20 RX ADMIN — Medication 10 ML: at 20:42

## 2022-11-20 RX ADMIN — INSULIN ASPART 7 UNITS: 100 INJECTION, SOLUTION INTRAVENOUS; SUBCUTANEOUS at 18:13

## 2022-11-20 RX ADMIN — IOPAMIDOL 126 ML: 755 INJECTION, SOLUTION INTRAVENOUS at 13:49

## 2022-11-20 RX ADMIN — LEVETIRACETAM 500 MG: 5 INJECTION, SOLUTION INTRAVENOUS at 21:37

## 2022-11-20 RX ADMIN — LEVOTHYROXINE SODIUM ANHYDROUS 50 MCG: 100 INJECTION, POWDER, LYOPHILIZED, FOR SOLUTION INTRAVENOUS at 23:37

## 2022-11-20 RX ADMIN — SODIUM CHLORIDE 2.5 MG/HR: 9 INJECTION, SOLUTION INTRAVENOUS at 15:30

## 2022-11-21 ENCOUNTER — APPOINTMENT (OUTPATIENT)
Dept: RESPIRATORY THERAPY | Facility: HOSPITAL | Age: 62
End: 2022-11-21

## 2022-11-21 LAB
25(OH)D3 SERPL-MCNC: 18.8 NG/ML (ref 30–100)
ALBUMIN SERPL-MCNC: 3.24 G/DL (ref 3.5–5.2)
ALBUMIN/GLOB SERPL: 1.3 G/DL
ALP SERPL-CCNC: 75 U/L (ref 39–117)
ALT SERPL W P-5'-P-CCNC: 16 U/L (ref 1–33)
ANION GAP SERPL CALCULATED.3IONS-SCNC: 15.4 MMOL/L (ref 5–15)
AST SERPL-CCNC: 17 U/L (ref 1–32)
BILIRUB SERPL-MCNC: 0.4 MG/DL (ref 0–1.2)
BUN SERPL-MCNC: 22 MG/DL (ref 8–23)
BUN/CREAT SERPL: 7.6 (ref 7–25)
CALCIUM SPEC-SCNC: 8 MG/DL (ref 8.6–10.5)
CHLORIDE SERPL-SCNC: 99 MMOL/L (ref 98–107)
CHOLEST SERPL-MCNC: 251 MG/DL (ref 0–200)
CO2 SERPL-SCNC: 21.6 MMOL/L (ref 22–29)
CREAT SERPL-MCNC: 2.91 MG/DL (ref 0.57–1)
DEPRECATED RDW RBC AUTO: 43.6 FL (ref 37–54)
EGFRCR SERPLBLD CKD-EPI 2021: 17.5 ML/MIN/1.73
ERYTHROCYTE [DISTWIDTH] IN BLOOD BY AUTOMATED COUNT: 13.3 % (ref 12.3–15.4)
FOLATE SERPL-MCNC: 11.8 NG/ML (ref 4.78–24.2)
GLOBULIN UR ELPH-MCNC: 2.6 GM/DL
GLUCOSE BLDC GLUCOMTR-MCNC: 114 MG/DL (ref 70–130)
GLUCOSE BLDC GLUCOMTR-MCNC: 117 MG/DL (ref 70–130)
GLUCOSE BLDC GLUCOMTR-MCNC: 171 MG/DL (ref 70–130)
GLUCOSE BLDC GLUCOMTR-MCNC: 238 MG/DL (ref 70–130)
GLUCOSE BLDC GLUCOMTR-MCNC: 457 MG/DL (ref 70–130)
GLUCOSE BLDC GLUCOMTR-MCNC: 66 MG/DL (ref 70–130)
GLUCOSE SERPL-MCNC: 187 MG/DL (ref 65–99)
HBA1C MFR BLD: 9.6 % (ref 4.8–5.6)
HCT VFR BLD AUTO: 28.4 % (ref 34–46.6)
HDLC SERPL-MCNC: 74 MG/DL (ref 40–60)
HGB BLD-MCNC: 9.7 G/DL (ref 12–15.9)
LDLC SERPL CALC-MCNC: 152 MG/DL (ref 0–100)
LDLC/HDLC SERPL: 2.01 {RATIO}
MCH RBC QN AUTO: 30.9 PG (ref 26.6–33)
MCHC RBC AUTO-ENTMCNC: 34.2 G/DL (ref 31.5–35.7)
MCV RBC AUTO: 90.4 FL (ref 79–97)
PLATELET # BLD AUTO: 154 10*3/MM3 (ref 140–450)
PMV BLD AUTO: 9.7 FL (ref 6–12)
POTASSIUM SERPL-SCNC: 3.2 MMOL/L (ref 3.5–5.2)
PROT SERPL-MCNC: 5.8 G/DL (ref 6–8.5)
RBC # BLD AUTO: 3.14 10*6/MM3 (ref 3.77–5.28)
SODIUM SERPL-SCNC: 136 MMOL/L (ref 136–145)
TRIGL SERPL-MCNC: 141 MG/DL (ref 0–150)
VIT B12 BLD-MCNC: 754 PG/ML (ref 211–946)
VLDLC SERPL-MCNC: 25 MG/DL (ref 5–40)
WBC NRBC COR # BLD: 11.33 10*3/MM3 (ref 3.4–10.8)

## 2022-11-21 PROCEDURE — 63710000001 INSULIN ASPART PER 5 UNITS: Performed by: INTERNAL MEDICINE

## 2022-11-21 PROCEDURE — 92610 EVALUATE SWALLOWING FUNCTION: CPT

## 2022-11-21 PROCEDURE — 95819 EEG AWAKE AND ASLEEP: CPT

## 2022-11-21 PROCEDURE — 25010000002 HYDROMORPHONE 1 MG/ML SOLUTION: Performed by: INTERNAL MEDICINE

## 2022-11-21 PROCEDURE — 97162 PT EVAL MOD COMPLEX 30 MIN: CPT

## 2022-11-21 PROCEDURE — 85027 COMPLETE CBC AUTOMATED: CPT | Performed by: INTERNAL MEDICINE

## 2022-11-21 PROCEDURE — 83036 HEMOGLOBIN GLYCOSYLATED A1C: CPT | Performed by: INTERNAL MEDICINE

## 2022-11-21 PROCEDURE — 82962 GLUCOSE BLOOD TEST: CPT

## 2022-11-21 PROCEDURE — 80053 COMPREHEN METABOLIC PANEL: CPT | Performed by: INTERNAL MEDICINE

## 2022-11-21 PROCEDURE — 80061 LIPID PANEL: CPT | Performed by: INTERNAL MEDICINE

## 2022-11-21 PROCEDURE — 25010000002 LORAZEPAM PER 2 MG: Performed by: INTERNAL MEDICINE

## 2022-11-21 PROCEDURE — 99232 SBSQ HOSP IP/OBS MODERATE 35: CPT | Performed by: STUDENT IN AN ORGANIZED HEALTH CARE EDUCATION/TRAINING PROGRAM

## 2022-11-21 PROCEDURE — 99232 SBSQ HOSP IP/OBS MODERATE 35: CPT | Performed by: NURSE PRACTITIONER

## 2022-11-21 RX ORDER — DIPHENOXYLATE HYDROCHLORIDE AND ATROPINE SULFATE 2.5; .025 MG/1; MG/1
1 TABLET ORAL DAILY
Status: DISCONTINUED | OUTPATIENT
Start: 2022-11-22 | End: 2022-11-24 | Stop reason: HOSPADM

## 2022-11-21 RX ORDER — HYDROCODONE BITARTRATE AND ACETAMINOPHEN 10; 325 MG/1; MG/1
1 TABLET ORAL 3 TIMES DAILY PRN
Status: DISCONTINUED | OUTPATIENT
Start: 2022-11-21 | End: 2022-11-24 | Stop reason: HOSPADM

## 2022-11-21 RX ORDER — LISINOPRIL 10 MG/1
20 TABLET ORAL NIGHTLY
Status: CANCELLED | OUTPATIENT
Start: 2022-11-21

## 2022-11-21 RX ORDER — LEVOTHYROXINE SODIUM 0.03 MG/1
25 TABLET ORAL DAILY
Status: DISCONTINUED | OUTPATIENT
Start: 2022-11-21 | End: 2022-11-24 | Stop reason: HOSPADM

## 2022-11-21 RX ORDER — ISOSORBIDE MONONITRATE 30 MG/1
30 TABLET, EXTENDED RELEASE ORAL EVERY MORNING
COMMUNITY

## 2022-11-21 RX ORDER — POTASSIUM CHLORIDE 20 MEQ/1
20 TABLET, EXTENDED RELEASE ORAL DAILY
Status: CANCELLED | OUTPATIENT
Start: 2022-11-21

## 2022-11-21 RX ORDER — TIZANIDINE 4 MG/1
4 TABLET ORAL EVERY 6 HOURS PRN
Status: CANCELLED | OUTPATIENT
Start: 2022-11-21

## 2022-11-21 RX ORDER — LISINOPRIL 20 MG/1
20 TABLET ORAL
COMMUNITY

## 2022-11-21 RX ORDER — LOPERAMIDE HYDROCHLORIDE 2 MG/1
2 CAPSULE ORAL 3 TIMES DAILY PRN
Status: DISCONTINUED | OUTPATIENT
Start: 2022-11-21 | End: 2022-11-24 | Stop reason: HOSPADM

## 2022-11-21 RX ORDER — CETIRIZINE HYDROCHLORIDE 10 MG/1
10 TABLET ORAL DAILY
Status: CANCELLED | OUTPATIENT
Start: 2022-11-21

## 2022-11-21 RX ORDER — FLUOXETINE HYDROCHLORIDE 20 MG/1
40 CAPSULE ORAL EVERY MORNING
Status: DISCONTINUED | OUTPATIENT
Start: 2022-11-21 | End: 2022-11-24 | Stop reason: HOSPADM

## 2022-11-21 RX ORDER — HYDROCODONE BITARTRATE AND ACETAMINOPHEN 10; 325 MG/1; MG/1
1 TABLET ORAL 3 TIMES DAILY PRN
Status: CANCELLED | OUTPATIENT
Start: 2022-11-21

## 2022-11-21 RX ORDER — ROPINIROLE 0.25 MG/1
0.5 TABLET, FILM COATED ORAL 2 TIMES DAILY
Status: CANCELLED | OUTPATIENT
Start: 2022-11-21

## 2022-11-21 RX ORDER — ASPIRIN 81 MG/1
81 TABLET ORAL DAILY
Status: DISCONTINUED | OUTPATIENT
Start: 2022-11-21 | End: 2022-11-24 | Stop reason: HOSPADM

## 2022-11-21 RX ORDER — POTASSIUM CHLORIDE 20 MEQ/1
20 TABLET, EXTENDED RELEASE ORAL DAILY
Status: DISCONTINUED | OUTPATIENT
Start: 2022-11-21 | End: 2022-11-24 | Stop reason: HOSPADM

## 2022-11-21 RX ORDER — LORATADINE 10 MG/1
10 TABLET ORAL DAILY
COMMUNITY

## 2022-11-21 RX ORDER — HYDRALAZINE HYDROCHLORIDE 25 MG/1
100 TABLET, FILM COATED ORAL 3 TIMES DAILY PRN
Status: CANCELLED | OUTPATIENT
Start: 2022-11-21

## 2022-11-21 RX ORDER — LISINOPRIL 10 MG/1
20 TABLET ORAL NIGHTLY
Status: DISCONTINUED | OUTPATIENT
Start: 2022-11-21 | End: 2022-11-24 | Stop reason: HOSPADM

## 2022-11-21 RX ORDER — CLONIDINE HYDROCHLORIDE 0.1 MG/1
0.1 TABLET ORAL 3 TIMES DAILY PRN
COMMUNITY
End: 2022-11-24 | Stop reason: HOSPADM

## 2022-11-21 RX ORDER — HYDROXYZINE HYDROCHLORIDE 25 MG/1
25 TABLET, FILM COATED ORAL 3 TIMES DAILY PRN
Status: CANCELLED | OUTPATIENT
Start: 2022-11-21

## 2022-11-21 RX ORDER — LOPERAMIDE HYDROCHLORIDE 2 MG/1
2 TABLET ORAL DAILY PRN
COMMUNITY

## 2022-11-21 RX ORDER — CETIRIZINE HYDROCHLORIDE 10 MG/1
10 TABLET ORAL DAILY
Status: DISCONTINUED | OUTPATIENT
Start: 2022-11-21 | End: 2022-11-22

## 2022-11-21 RX ORDER — PANTOPRAZOLE SODIUM 40 MG/1
40 TABLET, DELAYED RELEASE ORAL DAILY
Status: CANCELLED | OUTPATIENT
Start: 2022-11-21

## 2022-11-21 RX ORDER — ISOSORBIDE MONONITRATE 30 MG/1
30 TABLET, EXTENDED RELEASE ORAL EVERY MORNING
Status: DISCONTINUED | OUTPATIENT
Start: 2022-11-21 | End: 2022-11-24 | Stop reason: HOSPADM

## 2022-11-21 RX ORDER — SPIRONOLACTONE 25 MG/1
25 TABLET ORAL DAILY
Status: CANCELLED | OUTPATIENT
Start: 2022-11-21

## 2022-11-21 RX ORDER — LEVOTHYROXINE SODIUM 0.03 MG/1
25 TABLET ORAL DAILY
Status: CANCELLED | OUTPATIENT
Start: 2022-11-21

## 2022-11-21 RX ORDER — ONDANSETRON 4 MG/1
4 TABLET, ORALLY DISINTEGRATING ORAL EVERY 8 HOURS PRN
Status: DISCONTINUED | OUTPATIENT
Start: 2022-11-21 | End: 2022-11-24 | Stop reason: HOSPADM

## 2022-11-21 RX ORDER — FLUTICASONE PROPIONATE 50 MCG
2 SPRAY, SUSPENSION (ML) NASAL DAILY PRN
Status: CANCELLED | OUTPATIENT
Start: 2022-11-21

## 2022-11-21 RX ORDER — BENZONATATE 100 MG/1
100 CAPSULE ORAL 3 TIMES DAILY PRN
Status: CANCELLED | OUTPATIENT
Start: 2022-11-21

## 2022-11-21 RX ORDER — TIZANIDINE 4 MG/1
4 TABLET ORAL EVERY 6 HOURS PRN
Status: DISCONTINUED | OUTPATIENT
Start: 2022-11-21 | End: 2022-11-24 | Stop reason: HOSPADM

## 2022-11-21 RX ORDER — ATORVASTATIN CALCIUM 40 MG/1
80 TABLET, FILM COATED ORAL NIGHTLY
Status: CANCELLED | OUTPATIENT
Start: 2022-11-21

## 2022-11-21 RX ORDER — LOPERAMIDE HYDROCHLORIDE 2 MG/1
2 CAPSULE ORAL DAILY PRN
Status: CANCELLED | OUTPATIENT
Start: 2022-11-21

## 2022-11-21 RX ORDER — BENZONATATE 100 MG/1
100 CAPSULE ORAL 3 TIMES DAILY PRN
COMMUNITY

## 2022-11-21 RX ORDER — POTASSIUM CHLORIDE 20 MEQ/1
20 TABLET, EXTENDED RELEASE ORAL DAILY
COMMUNITY

## 2022-11-21 RX ORDER — METOCLOPRAMIDE 10 MG/1
5 TABLET ORAL
Status: CANCELLED | OUTPATIENT
Start: 2022-11-21

## 2022-11-21 RX ORDER — TRAZODONE HYDROCHLORIDE 50 MG/1
100 TABLET ORAL NIGHTLY
Status: CANCELLED | OUTPATIENT
Start: 2022-11-21

## 2022-11-21 RX ORDER — LORAZEPAM 2 MG/ML
1 INJECTION INTRAMUSCULAR ONCE
Status: COMPLETED | OUTPATIENT
Start: 2022-11-21 | End: 2022-11-21

## 2022-11-21 RX ORDER — ROPINIROLE 0.25 MG/1
0.5 TABLET, FILM COATED ORAL 2 TIMES DAILY
Status: DISCONTINUED | OUTPATIENT
Start: 2022-11-21 | End: 2022-11-24 | Stop reason: HOSPADM

## 2022-11-21 RX ORDER — SPIRONOLACTONE 25 MG/1
25 TABLET ORAL DAILY
COMMUNITY
End: 2022-11-24 | Stop reason: HOSPADM

## 2022-11-21 RX ORDER — FLUOXETINE HYDROCHLORIDE 20 MG/1
40 CAPSULE ORAL EVERY MORNING
Status: CANCELLED | OUTPATIENT
Start: 2022-11-21

## 2022-11-21 RX ORDER — TRAZODONE HYDROCHLORIDE 50 MG/1
100 TABLET ORAL NIGHTLY
Status: DISCONTINUED | OUTPATIENT
Start: 2022-11-21 | End: 2022-11-24 | Stop reason: HOSPADM

## 2022-11-21 RX ORDER — CLONIDINE HYDROCHLORIDE 0.1 MG/1
0.1 TABLET ORAL 3 TIMES DAILY PRN
Status: CANCELLED | OUTPATIENT
Start: 2022-11-21

## 2022-11-21 RX ORDER — METOCLOPRAMIDE 10 MG/1
5 TABLET ORAL
Status: DISCONTINUED | OUTPATIENT
Start: 2022-11-22 | End: 2022-11-24 | Stop reason: HOSPADM

## 2022-11-21 RX ORDER — HYDROXYZINE HYDROCHLORIDE 25 MG/1
25 TABLET, FILM COATED ORAL 3 TIMES DAILY PRN
Status: DISCONTINUED | OUTPATIENT
Start: 2022-11-21 | End: 2022-11-24 | Stop reason: HOSPADM

## 2022-11-21 RX ORDER — GABAPENTIN 300 MG/1
300 CAPSULE ORAL DAILY
Status: DISCONTINUED | OUTPATIENT
Start: 2022-11-21 | End: 2022-11-24 | Stop reason: HOSPADM

## 2022-11-21 RX ORDER — DEXTROSE MONOHYDRATE 25 G/50ML
25 INJECTION, SOLUTION INTRAVENOUS
Status: DISCONTINUED | OUTPATIENT
Start: 2022-11-21 | End: 2022-11-24 | Stop reason: HOSPADM

## 2022-11-21 RX ORDER — ATORVASTATIN CALCIUM 40 MG/1
80 TABLET, FILM COATED ORAL NIGHTLY
Status: DISCONTINUED | OUTPATIENT
Start: 2022-11-21 | End: 2022-11-24 | Stop reason: HOSPADM

## 2022-11-21 RX ORDER — FLUTICASONE PROPIONATE 50 MCG
2 SPRAY, SUSPENSION (ML) NASAL DAILY PRN
Status: DISCONTINUED | OUTPATIENT
Start: 2022-11-21 | End: 2022-11-24 | Stop reason: HOSPADM

## 2022-11-21 RX ORDER — GABAPENTIN 300 MG/1
300 CAPSULE ORAL DAILY
Status: CANCELLED | OUTPATIENT
Start: 2022-11-21

## 2022-11-21 RX ORDER — INSULIN ASPART 100 [IU]/ML
3 INJECTION, SOLUTION INTRAVENOUS; SUBCUTANEOUS
Status: CANCELLED | OUTPATIENT
Start: 2022-11-21

## 2022-11-21 RX ORDER — TRAZODONE HYDROCHLORIDE 100 MG/1
100 TABLET ORAL NIGHTLY
COMMUNITY

## 2022-11-21 RX ORDER — INSULIN ASPART 100 [IU]/ML
0-7 INJECTION, SOLUTION INTRAVENOUS; SUBCUTANEOUS
Status: DISCONTINUED | OUTPATIENT
Start: 2022-11-22 | End: 2022-11-24 | Stop reason: HOSPADM

## 2022-11-21 RX ORDER — ASPIRIN 81 MG/1
81 TABLET ORAL DAILY
Status: CANCELLED | OUTPATIENT
Start: 2022-11-21

## 2022-11-21 RX ORDER — DIPHENOXYLATE HYDROCHLORIDE AND ATROPINE SULFATE 2.5; .025 MG/1; MG/1
1 TABLET ORAL DAILY
Status: CANCELLED | OUTPATIENT
Start: 2022-11-21

## 2022-11-21 RX ORDER — PANTOPRAZOLE SODIUM 40 MG/1
40 TABLET, DELAYED RELEASE ORAL DAILY
Status: DISCONTINUED | OUTPATIENT
Start: 2022-11-21 | End: 2022-11-24 | Stop reason: HOSPADM

## 2022-11-21 RX ORDER — ONDANSETRON 4 MG/1
4 TABLET, ORALLY DISINTEGRATING ORAL DAILY PRN
Status: CANCELLED | OUTPATIENT
Start: 2022-11-21

## 2022-11-21 RX ORDER — NICOTINE POLACRILEX 4 MG
15 LOZENGE BUCCAL
Status: DISCONTINUED | OUTPATIENT
Start: 2022-11-21 | End: 2022-11-24 | Stop reason: HOSPADM

## 2022-11-21 RX ORDER — ISOSORBIDE MONONITRATE 30 MG/1
30 TABLET, EXTENDED RELEASE ORAL EVERY MORNING
Status: CANCELLED | OUTPATIENT
Start: 2022-11-21

## 2022-11-21 RX ADMIN — ROPINIROLE HYDROCHLORIDE 0.5 MG: 0.25 TABLET, FILM COATED ORAL at 20:46

## 2022-11-21 RX ADMIN — POTASSIUM CHLORIDE 20 MEQ: 20 TABLET, EXTENDED RELEASE ORAL at 14:42

## 2022-11-21 RX ADMIN — Medication 10 ML: at 20:48

## 2022-11-21 RX ADMIN — HYDROMORPHONE HYDROCHLORIDE 1 MG: 1 INJECTION, SOLUTION INTRAMUSCULAR; INTRAVENOUS; SUBCUTANEOUS at 17:00

## 2022-11-21 RX ADMIN — LISINOPRIL 20 MG: 10 TABLET ORAL at 20:45

## 2022-11-21 RX ADMIN — LEVOTHYROXINE SODIUM 25 MCG: 25 TABLET ORAL at 20:46

## 2022-11-21 RX ADMIN — INSULIN ASPART 4 UNITS: 100 INJECTION, SOLUTION INTRAVENOUS; SUBCUTANEOUS at 05:47

## 2022-11-21 RX ADMIN — Medication 10 ML: at 08:13

## 2022-11-21 RX ADMIN — ATORVASTATIN CALCIUM 80 MG: 40 TABLET, FILM COATED ORAL at 20:47

## 2022-11-21 RX ADMIN — PANTOPRAZOLE SODIUM 40 MG: 40 TABLET, DELAYED RELEASE ORAL at 20:48

## 2022-11-21 RX ADMIN — HYDROMORPHONE HYDROCHLORIDE 1 MG: 1 INJECTION, SOLUTION INTRAMUSCULAR; INTRAVENOUS; SUBCUTANEOUS at 03:57

## 2022-11-21 RX ADMIN — TRAZODONE HYDROCHLORIDE 100 MG: 50 TABLET ORAL at 20:48

## 2022-11-21 RX ADMIN — INSULIN ASPART 2 UNITS: 100 INJECTION, SOLUTION INTRAVENOUS; SUBCUTANEOUS at 17:00

## 2022-11-21 RX ADMIN — HYDROMORPHONE HYDROCHLORIDE 1 MG: 1 INJECTION, SOLUTION INTRAMUSCULAR; INTRAVENOUS; SUBCUTANEOUS at 08:55

## 2022-11-21 RX ADMIN — HYDROMORPHONE HYDROCHLORIDE 1 MG: 1 INJECTION, SOLUTION INTRAMUSCULAR; INTRAVENOUS; SUBCUTANEOUS at 13:42

## 2022-11-21 RX ADMIN — LORAZEPAM 1 MG: 2 INJECTION INTRAMUSCULAR; INTRAVENOUS at 05:39

## 2022-11-21 RX ADMIN — ASPIRIN 81 MG: 81 TABLET, COATED ORAL at 20:47

## 2022-11-22 LAB
ALBUMIN SERPL-MCNC: 3.13 G/DL (ref 3.5–5.2)
ALBUMIN/GLOB SERPL: 1.4 G/DL
ALP SERPL-CCNC: 64 U/L (ref 39–117)
ALT SERPL W P-5'-P-CCNC: 17 U/L (ref 1–33)
ANION GAP SERPL CALCULATED.3IONS-SCNC: 13.6 MMOL/L (ref 5–15)
AST SERPL-CCNC: 21 U/L (ref 1–32)
BILIRUB SERPL-MCNC: 0.3 MG/DL (ref 0–1.2)
BUN SERPL-MCNC: 28 MG/DL (ref 8–23)
BUN/CREAT SERPL: 8.1 (ref 7–25)
CALCIUM SPEC-SCNC: 8.3 MG/DL (ref 8.6–10.5)
CHLORIDE SERPL-SCNC: 99 MMOL/L (ref 98–107)
CO2 SERPL-SCNC: 24.4 MMOL/L (ref 22–29)
CREAT SERPL-MCNC: 3.44 MG/DL (ref 0.57–1)
DEPRECATED RDW RBC AUTO: 47.7 FL (ref 37–54)
EGFRCR SERPLBLD CKD-EPI 2021: 14.3 ML/MIN/1.73
ERYTHROCYTE [DISTWIDTH] IN BLOOD BY AUTOMATED COUNT: 14 % (ref 12.3–15.4)
GLOBULIN UR ELPH-MCNC: 2.3 GM/DL
GLUCOSE BLDC GLUCOMTR-MCNC: 188 MG/DL (ref 70–130)
GLUCOSE BLDC GLUCOMTR-MCNC: 205 MG/DL (ref 70–130)
GLUCOSE BLDC GLUCOMTR-MCNC: 231 MG/DL (ref 70–130)
GLUCOSE BLDC GLUCOMTR-MCNC: 283 MG/DL (ref 70–130)
GLUCOSE SERPL-MCNC: 122 MG/DL (ref 65–99)
HCT VFR BLD AUTO: 26.1 % (ref 34–46.6)
HGB BLD-MCNC: 8.6 G/DL (ref 12–15.9)
MCH RBC QN AUTO: 30.9 PG (ref 26.6–33)
MCHC RBC AUTO-ENTMCNC: 33 G/DL (ref 31.5–35.7)
MCV RBC AUTO: 93.9 FL (ref 79–97)
PLATELET # BLD AUTO: 143 10*3/MM3 (ref 140–450)
PMV BLD AUTO: 9.7 FL (ref 6–12)
POTASSIUM SERPL-SCNC: 3.6 MMOL/L (ref 3.5–5.2)
PROT SERPL-MCNC: 5.4 G/DL (ref 6–8.5)
QT INTERVAL: 314 MS
QTC INTERVAL: 453 MS
RBC # BLD AUTO: 2.78 10*6/MM3 (ref 3.77–5.28)
SODIUM SERPL-SCNC: 137 MMOL/L (ref 136–145)
WBC NRBC COR # BLD: 7.58 10*3/MM3 (ref 3.4–10.8)

## 2022-11-22 PROCEDURE — 92523 SPEECH SOUND LANG COMPREHEN: CPT

## 2022-11-22 PROCEDURE — 82962 GLUCOSE BLOOD TEST: CPT

## 2022-11-22 PROCEDURE — 92610 EVALUATE SWALLOWING FUNCTION: CPT

## 2022-11-22 PROCEDURE — 80053 COMPREHEN METABOLIC PANEL: CPT | Performed by: INTERNAL MEDICINE

## 2022-11-22 PROCEDURE — 63710000001 INSULIN DETEMIR PER 5 UNITS: Performed by: STUDENT IN AN ORGANIZED HEALTH CARE EDUCATION/TRAINING PROGRAM

## 2022-11-22 PROCEDURE — 99231 SBSQ HOSP IP/OBS SF/LOW 25: CPT | Performed by: STUDENT IN AN ORGANIZED HEALTH CARE EDUCATION/TRAINING PROGRAM

## 2022-11-22 PROCEDURE — 85027 COMPLETE CBC AUTOMATED: CPT | Performed by: INTERNAL MEDICINE

## 2022-11-22 PROCEDURE — 63710000001 INSULIN ASPART PER 5 UNITS: Performed by: STUDENT IN AN ORGANIZED HEALTH CARE EDUCATION/TRAINING PROGRAM

## 2022-11-22 PROCEDURE — 25010000002 HYDROMORPHONE 1 MG/ML SOLUTION: Performed by: STUDENT IN AN ORGANIZED HEALTH CARE EDUCATION/TRAINING PROGRAM

## 2022-11-22 RX ORDER — CETIRIZINE HYDROCHLORIDE 10 MG/1
5 TABLET ORAL DAILY
Status: DISCONTINUED | OUTPATIENT
Start: 2022-11-23 | End: 2022-11-24 | Stop reason: HOSPADM

## 2022-11-22 RX ORDER — HYDRALAZINE HYDROCHLORIDE 10 MG/1
10 TABLET, FILM COATED ORAL EVERY 8 HOURS SCHEDULED
Status: DISCONTINUED | OUTPATIENT
Start: 2022-11-22 | End: 2022-11-24 | Stop reason: HOSPADM

## 2022-11-22 RX ADMIN — Medication 1 TABLET: at 08:19

## 2022-11-22 RX ADMIN — ASPIRIN 81 MG: 81 TABLET, COATED ORAL at 08:19

## 2022-11-22 RX ADMIN — TRAZODONE HYDROCHLORIDE 100 MG: 50 TABLET ORAL at 21:24

## 2022-11-22 RX ADMIN — HYDROCODONE BITARTRATE AND ACETAMINOPHEN 1 TABLET: 10; 325 TABLET ORAL at 10:22

## 2022-11-22 RX ADMIN — LEVOTHYROXINE SODIUM 25 MCG: 25 TABLET ORAL at 08:19

## 2022-11-22 RX ADMIN — METOCLOPRAMIDE 5 MG: 10 TABLET ORAL at 08:19

## 2022-11-22 RX ADMIN — HYDROMORPHONE HYDROCHLORIDE 1 MG: 1 INJECTION, SOLUTION INTRAMUSCULAR; INTRAVENOUS; SUBCUTANEOUS at 08:24

## 2022-11-22 RX ADMIN — LISINOPRIL 20 MG: 10 TABLET ORAL at 21:24

## 2022-11-22 RX ADMIN — FLUOXETINE HYDROCHLORIDE 40 MG: 20 CAPSULE ORAL at 06:07

## 2022-11-22 RX ADMIN — INSULIN ASPART 3 UNITS: 100 INJECTION, SOLUTION INTRAVENOUS; SUBCUTANEOUS at 12:04

## 2022-11-22 RX ADMIN — METOCLOPRAMIDE 5 MG: 10 TABLET ORAL at 16:37

## 2022-11-22 RX ADMIN — ROPINIROLE HYDROCHLORIDE 0.5 MG: 0.25 TABLET, FILM COATED ORAL at 21:23

## 2022-11-22 RX ADMIN — Medication 10 ML: at 08:20

## 2022-11-22 RX ADMIN — INSULIN ASPART 3 UNITS: 100 INJECTION, SOLUTION INTRAVENOUS; SUBCUTANEOUS at 16:37

## 2022-11-22 RX ADMIN — Medication 10 ML: at 21:28

## 2022-11-22 RX ADMIN — ATORVASTATIN CALCIUM 80 MG: 40 TABLET, FILM COATED ORAL at 21:24

## 2022-11-22 RX ADMIN — ISOSORBIDE MONONITRATE 30 MG: 30 TABLET, EXTENDED RELEASE ORAL at 06:07

## 2022-11-22 RX ADMIN — INSULIN ASPART 2 UNITS: 100 INJECTION, SOLUTION INTRAVENOUS; SUBCUTANEOUS at 08:19

## 2022-11-22 RX ADMIN — HYDRALAZINE HYDROCHLORIDE 10 MG: 10 TABLET, FILM COATED ORAL at 21:23

## 2022-11-22 RX ADMIN — HYDROMORPHONE HYDROCHLORIDE 1 MG: 1 INJECTION, SOLUTION INTRAMUSCULAR; INTRAVENOUS; SUBCUTANEOUS at 15:03

## 2022-11-22 RX ADMIN — SODIUM CHLORIDE 2.5 MG/HR: 9 INJECTION, SOLUTION INTRAVENOUS at 16:52

## 2022-11-22 RX ADMIN — POTASSIUM CHLORIDE 20 MEQ: 20 TABLET, EXTENDED RELEASE ORAL at 08:19

## 2022-11-22 RX ADMIN — CETIRIZINE HYDROCHLORIDE 10 MG: 10 TABLET, FILM COATED ORAL at 08:19

## 2022-11-22 RX ADMIN — INSULIN DETEMIR 10 UNITS: 100 INJECTION, SOLUTION SUBCUTANEOUS at 21:25

## 2022-11-23 LAB
ANION GAP SERPL CALCULATED.3IONS-SCNC: 11.1 MMOL/L (ref 5–15)
BUN SERPL-MCNC: 31 MG/DL (ref 8–23)
BUN/CREAT SERPL: 8.1 (ref 7–25)
CALCIUM SPEC-SCNC: 7.8 MG/DL (ref 8.6–10.5)
CHLORIDE SERPL-SCNC: 100 MMOL/L (ref 98–107)
CO2 SERPL-SCNC: 25.9 MMOL/L (ref 22–29)
CREAT SERPL-MCNC: 3.83 MG/DL (ref 0.57–1)
DEPRECATED RDW RBC AUTO: 49.9 FL (ref 37–54)
EGFRCR SERPLBLD CKD-EPI 2021: 12.6 ML/MIN/1.73
ERYTHROCYTE [DISTWIDTH] IN BLOOD BY AUTOMATED COUNT: 14.2 % (ref 12.3–15.4)
GLUCOSE BLDC GLUCOMTR-MCNC: 205 MG/DL (ref 70–130)
GLUCOSE BLDC GLUCOMTR-MCNC: 234 MG/DL (ref 70–130)
GLUCOSE BLDC GLUCOMTR-MCNC: 274 MG/DL (ref 70–130)
GLUCOSE BLDC GLUCOMTR-MCNC: 286 MG/DL (ref 70–130)
GLUCOSE BLDC GLUCOMTR-MCNC: 72 MG/DL (ref 70–130)
GLUCOSE BLDC GLUCOMTR-MCNC: 76 MG/DL (ref 70–130)
GLUCOSE SERPL-MCNC: 268 MG/DL (ref 65–99)
HAV IGM SERPL QL IA: NORMAL
HBV CORE IGM SERPL QL IA: NORMAL
HBV SURFACE AG SERPL QL IA: NORMAL
HCT VFR BLD AUTO: 23.9 % (ref 34–46.6)
HCV AB SER DONR QL: NORMAL
HGB BLD-MCNC: 7.8 G/DL (ref 12–15.9)
MCH RBC QN AUTO: 31.1 PG (ref 26.6–33)
MCHC RBC AUTO-ENTMCNC: 32.6 G/DL (ref 31.5–35.7)
MCV RBC AUTO: 95.2 FL (ref 79–97)
PLATELET # BLD AUTO: 135 10*3/MM3 (ref 140–450)
PMV BLD AUTO: 9.2 FL (ref 6–12)
POTASSIUM SERPL-SCNC: 3.7 MMOL/L (ref 3.5–5.2)
RBC # BLD AUTO: 2.51 10*6/MM3 (ref 3.77–5.28)
SODIUM SERPL-SCNC: 137 MMOL/L (ref 136–145)
WBC NRBC COR # BLD: 5.87 10*3/MM3 (ref 3.4–10.8)

## 2022-11-23 PROCEDURE — 85027 COMPLETE CBC AUTOMATED: CPT | Performed by: STUDENT IN AN ORGANIZED HEALTH CARE EDUCATION/TRAINING PROGRAM

## 2022-11-23 PROCEDURE — 80074 ACUTE HEPATITIS PANEL: CPT | Performed by: INTERNAL MEDICINE

## 2022-11-23 PROCEDURE — 80048 BASIC METABOLIC PNL TOTAL CA: CPT | Performed by: STUDENT IN AN ORGANIZED HEALTH CARE EDUCATION/TRAINING PROGRAM

## 2022-11-23 PROCEDURE — 25010000002 NALOXONE PER 1 MG: Performed by: STUDENT IN AN ORGANIZED HEALTH CARE EDUCATION/TRAINING PROGRAM

## 2022-11-23 PROCEDURE — 99232 SBSQ HOSP IP/OBS MODERATE 35: CPT | Performed by: STUDENT IN AN ORGANIZED HEALTH CARE EDUCATION/TRAINING PROGRAM

## 2022-11-23 PROCEDURE — 25010000002 HYDROMORPHONE 1 MG/ML SOLUTION: Performed by: STUDENT IN AN ORGANIZED HEALTH CARE EDUCATION/TRAINING PROGRAM

## 2022-11-23 PROCEDURE — 63710000001 INSULIN ASPART PER 5 UNITS: Performed by: STUDENT IN AN ORGANIZED HEALTH CARE EDUCATION/TRAINING PROGRAM

## 2022-11-23 PROCEDURE — 63710000001 INSULIN DETEMIR PER 5 UNITS: Performed by: STUDENT IN AN ORGANIZED HEALTH CARE EDUCATION/TRAINING PROGRAM

## 2022-11-23 PROCEDURE — 82962 GLUCOSE BLOOD TEST: CPT

## 2022-11-23 RX ORDER — NALOXONE HCL 0.4 MG/ML
0.2 VIAL (ML) INJECTION
Status: DISCONTINUED | OUTPATIENT
Start: 2022-11-23 | End: 2022-11-24 | Stop reason: HOSPADM

## 2022-11-23 RX ADMIN — NALOXONE HYDROCHLORIDE 0.2 MG: 0.4 INJECTION, SOLUTION INTRAMUSCULAR; INTRAVENOUS; SUBCUTANEOUS at 11:16

## 2022-11-23 RX ADMIN — LEVOTHYROXINE SODIUM 25 MCG: 25 TABLET ORAL at 08:14

## 2022-11-23 RX ADMIN — LISINOPRIL 20 MG: 10 TABLET ORAL at 21:06

## 2022-11-23 RX ADMIN — Medication 10 ML: at 08:15

## 2022-11-23 RX ADMIN — CETIRIZINE HYDROCHLORIDE 5 MG: 10 TABLET, FILM COATED ORAL at 08:14

## 2022-11-23 RX ADMIN — TRAZODONE HYDROCHLORIDE 100 MG: 50 TABLET ORAL at 21:06

## 2022-11-23 RX ADMIN — HYDRALAZINE HYDROCHLORIDE 10 MG: 10 TABLET, FILM COATED ORAL at 13:50

## 2022-11-23 RX ADMIN — FLUOXETINE HYDROCHLORIDE 40 MG: 20 CAPSULE ORAL at 06:25

## 2022-11-23 RX ADMIN — ATORVASTATIN CALCIUM 80 MG: 40 TABLET, FILM COATED ORAL at 21:06

## 2022-11-23 RX ADMIN — Medication 1 TABLET: at 08:15

## 2022-11-23 RX ADMIN — HYDROMORPHONE HYDROCHLORIDE 1 MG: 1 INJECTION, SOLUTION INTRAMUSCULAR; INTRAVENOUS; SUBCUTANEOUS at 08:30

## 2022-11-23 RX ADMIN — ISOSORBIDE MONONITRATE 30 MG: 30 TABLET, EXTENDED RELEASE ORAL at 06:25

## 2022-11-23 RX ADMIN — HYDRALAZINE HYDROCHLORIDE 10 MG: 10 TABLET, FILM COATED ORAL at 21:06

## 2022-11-23 RX ADMIN — INSULIN ASPART 3 UNITS: 100 INJECTION, SOLUTION INTRAVENOUS; SUBCUTANEOUS at 16:38

## 2022-11-23 RX ADMIN — METOCLOPRAMIDE 5 MG: 10 TABLET ORAL at 08:15

## 2022-11-23 RX ADMIN — METOCLOPRAMIDE 5 MG: 10 TABLET ORAL at 11:26

## 2022-11-23 RX ADMIN — Medication 10 ML: at 21:07

## 2022-11-23 RX ADMIN — METOCLOPRAMIDE 5 MG: 10 TABLET ORAL at 16:38

## 2022-11-23 RX ADMIN — HYDROCODONE BITARTRATE AND ACETAMINOPHEN 1 TABLET: 10; 325 TABLET ORAL at 23:06

## 2022-11-23 RX ADMIN — PANTOPRAZOLE SODIUM 40 MG: 40 TABLET, DELAYED RELEASE ORAL at 08:15

## 2022-11-23 RX ADMIN — SODIUM CHLORIDE 250 ML: 9 INJECTION, SOLUTION INTRAVENOUS at 09:19

## 2022-11-23 RX ADMIN — INSULIN ASPART 3 UNITS: 100 INJECTION, SOLUTION INTRAVENOUS; SUBCUTANEOUS at 08:15

## 2022-11-23 RX ADMIN — ACETAMINOPHEN 650 MG: 325 TABLET ORAL at 19:50

## 2022-11-23 RX ADMIN — ROPINIROLE HYDROCHLORIDE 0.5 MG: 0.25 TABLET, FILM COATED ORAL at 08:15

## 2022-11-23 RX ADMIN — POTASSIUM CHLORIDE 20 MEQ: 20 TABLET, EXTENDED RELEASE ORAL at 08:15

## 2022-11-23 RX ADMIN — ASPIRIN 81 MG: 81 TABLET, COATED ORAL at 08:15

## 2022-11-23 RX ADMIN — SODIUM CHLORIDE 250 ML: 9 INJECTION, SOLUTION INTRAVENOUS at 10:45

## 2022-11-23 RX ADMIN — INSULIN DETEMIR 10 UNITS: 100 INJECTION, SOLUTION SUBCUTANEOUS at 21:06

## 2022-11-23 RX ADMIN — HYDRALAZINE HYDROCHLORIDE 10 MG: 10 TABLET, FILM COATED ORAL at 06:25

## 2022-11-23 RX ADMIN — FLUTICASONE PROPIONATE 2 SPRAY: 50 SPRAY, METERED NASAL at 21:07

## 2022-11-23 RX ADMIN — ROPINIROLE HYDROCHLORIDE 0.5 MG: 0.25 TABLET, FILM COATED ORAL at 21:06

## 2022-11-24 ENCOUNTER — READMISSION MANAGEMENT (OUTPATIENT)
Dept: CALL CENTER | Facility: HOSPITAL | Age: 62
End: 2022-11-24

## 2022-11-24 VITALS
TEMPERATURE: 98.4 F | BODY MASS INDEX: 18.44 KG/M2 | DIASTOLIC BLOOD PRESSURE: 59 MMHG | HEART RATE: 96 BPM | HEIGHT: 65 IN | OXYGEN SATURATION: 94 % | WEIGHT: 110.67 LBS | RESPIRATION RATE: 16 BRPM | SYSTOLIC BLOOD PRESSURE: 108 MMHG

## 2022-11-24 LAB
ANION GAP SERPL CALCULATED.3IONS-SCNC: 12.4 MMOL/L (ref 5–15)
BUN SERPL-MCNC: 28 MG/DL (ref 8–23)
BUN/CREAT SERPL: 7.6 (ref 7–25)
CALCIUM SPEC-SCNC: 7.6 MG/DL (ref 8.6–10.5)
CHLORIDE SERPL-SCNC: 101 MMOL/L (ref 98–107)
CO2 SERPL-SCNC: 21.6 MMOL/L (ref 22–29)
CREAT SERPL-MCNC: 3.68 MG/DL (ref 0.57–1)
DEPRECATED RDW RBC AUTO: 51.7 FL (ref 37–54)
EGFRCR SERPLBLD CKD-EPI 2021: 13.2 ML/MIN/1.73
ERYTHROCYTE [DISTWIDTH] IN BLOOD BY AUTOMATED COUNT: 14 % (ref 12.3–15.4)
GLUCOSE BLDC GLUCOMTR-MCNC: 175 MG/DL (ref 70–130)
GLUCOSE SERPL-MCNC: 252 MG/DL (ref 65–99)
HCT VFR BLD AUTO: 24.6 % (ref 34–46.6)
HGB BLD-MCNC: 7.5 G/DL (ref 12–15.9)
MCH RBC QN AUTO: 30.7 PG (ref 26.6–33)
MCHC RBC AUTO-ENTMCNC: 30.5 G/DL (ref 31.5–35.7)
MCV RBC AUTO: 100.8 FL (ref 79–97)
PLATELET # BLD AUTO: 127 10*3/MM3 (ref 140–450)
PMV BLD AUTO: 9.7 FL (ref 6–12)
POTASSIUM SERPL-SCNC: 4.4 MMOL/L (ref 3.5–5.2)
RBC # BLD AUTO: 2.44 10*6/MM3 (ref 3.77–5.28)
SODIUM SERPL-SCNC: 135 MMOL/L (ref 136–145)
WBC NRBC COR # BLD: 6.29 10*3/MM3 (ref 3.4–10.8)

## 2022-11-24 PROCEDURE — 63710000001 INSULIN ASPART PER 5 UNITS: Performed by: STUDENT IN AN ORGANIZED HEALTH CARE EDUCATION/TRAINING PROGRAM

## 2022-11-24 PROCEDURE — 80048 BASIC METABOLIC PNL TOTAL CA: CPT | Performed by: STUDENT IN AN ORGANIZED HEALTH CARE EDUCATION/TRAINING PROGRAM

## 2022-11-24 PROCEDURE — 82962 GLUCOSE BLOOD TEST: CPT

## 2022-11-24 PROCEDURE — 85027 COMPLETE CBC AUTOMATED: CPT | Performed by: STUDENT IN AN ORGANIZED HEALTH CARE EDUCATION/TRAINING PROGRAM

## 2022-11-24 PROCEDURE — 99239 HOSP IP/OBS DSCHRG MGMT >30: CPT | Performed by: STUDENT IN AN ORGANIZED HEALTH CARE EDUCATION/TRAINING PROGRAM

## 2022-11-24 PROCEDURE — 94799 UNLISTED PULMONARY SVC/PX: CPT

## 2022-11-24 PROCEDURE — 94761 N-INVAS EAR/PLS OXIMETRY MLT: CPT

## 2022-11-24 RX ORDER — HYDRALAZINE HYDROCHLORIDE 10 MG/1
100 TABLET, FILM COATED ORAL 3 TIMES DAILY PRN
Qty: 90 TABLET | Refills: 1 | Status: SHIPPED | OUTPATIENT
Start: 2022-11-24

## 2022-11-24 RX ADMIN — ROPINIROLE HYDROCHLORIDE 0.5 MG: 0.25 TABLET, FILM COATED ORAL at 08:34

## 2022-11-24 RX ADMIN — LEVOTHYROXINE SODIUM 25 MCG: 25 TABLET ORAL at 08:34

## 2022-11-24 RX ADMIN — METOCLOPRAMIDE 5 MG: 10 TABLET ORAL at 08:34

## 2022-11-24 RX ADMIN — HYDROCODONE BITARTRATE AND ACETAMINOPHEN 1 TABLET: 10; 325 TABLET ORAL at 08:33

## 2022-11-24 RX ADMIN — PANTOPRAZOLE SODIUM 40 MG: 40 TABLET, DELAYED RELEASE ORAL at 08:34

## 2022-11-24 RX ADMIN — POTASSIUM CHLORIDE 20 MEQ: 20 TABLET, EXTENDED RELEASE ORAL at 08:34

## 2022-11-24 RX ADMIN — INSULIN ASPART 2 UNITS: 100 INJECTION, SOLUTION INTRAVENOUS; SUBCUTANEOUS at 08:35

## 2022-11-24 RX ADMIN — HYDRALAZINE HYDROCHLORIDE 10 MG: 10 TABLET, FILM COATED ORAL at 06:32

## 2022-11-24 RX ADMIN — Medication 1 TABLET: at 08:34

## 2022-11-24 RX ADMIN — CETIRIZINE HYDROCHLORIDE 5 MG: 10 TABLET, FILM COATED ORAL at 08:34

## 2022-11-24 RX ADMIN — ISOSORBIDE MONONITRATE 30 MG: 30 TABLET, EXTENDED RELEASE ORAL at 06:32

## 2022-11-24 RX ADMIN — FLUOXETINE HYDROCHLORIDE 40 MG: 20 CAPSULE ORAL at 06:32

## 2022-11-24 RX ADMIN — Medication 10 ML: at 08:35

## 2022-11-24 RX ADMIN — ASPIRIN 81 MG: 81 TABLET, COATED ORAL at 08:34

## 2022-11-25 NOTE — OUTREACH NOTE
Prep Survey    Flowsheet Row Responses   [facility] discharged [facility]? [facility]   Is LACE score < 7 ? No   Emergency Room discharge w/ pulse ox? No   Eligibility Readm Mgmt   Discharge diagnosis Hypertensive emergency   Does the patient have one of the following disease processes/diagnoses(primary or secondary)? Other   Does the patient have Home health ordered? No   Is there a DME ordered? No   Prep survey completed? Yes          CHUCKY GOMEZ - Registered Nurse

## 2022-11-28 ENCOUNTER — TELEPHONE (OUTPATIENT)
Dept: TELEMETRY | Facility: HOSPITAL | Age: 62
End: 2022-11-28

## 2022-12-08 ENCOUNTER — READMISSION MANAGEMENT (OUTPATIENT)
Dept: CALL CENTER | Facility: HOSPITAL | Age: 62
End: 2022-12-08

## 2022-12-08 NOTE — OUTREACH NOTE
Medical Week 2 Survey    Flowsheet Row Responses   [facility] patient discharged from? [facility]   Does the patient have one of the following disease processes/diagnoses(primary or secondary)? Other   Week 2 attempt successful? Yes   Call start time 1431   Discharge diagnosis Hypertensive emergency   Call end time 1438   Is patient permission given to speak with other caregiver? Yes   List who call center can speak with Cliff Leiva (healthcare surrogate) Significant Other    Person spoke with today (if not patient) and relationship Cliff Leiva (healthcare surrogate) Significant Other    Meds reviewed with patient/caregiver? Yes   Does the patient have all medications ordered at discharge? Yes   Is the patient taking all medications as directed (includes completed medication regime)? Yes   Does the patient have a primary care provider?  Yes   Does the patient have an appointment with their PCP within 7 days of discharge? Greater than 7 days   Comments regarding PCP PCP appt tomorrow 12/9   Nursing Interventions Verified appointment date/time/provider   Has the patient kept scheduled appointments due by today? N/A   Has home health visited the patient within 72 hours of discharge? N/A   Psychosocial issues? No   Did the patient receive a copy of their discharge instructions? Yes   Nursing interventions Reviewed instructions with patient   What is the patient's perception of their health status since discharge? Improving  [Reports some improvement, but blood pressures still up and down. ]   Is the patient/caregiver able to teach back signs and symptoms related to disease process for when to call PCP? Yes   Is the patient/caregiver able to teach back signs and symptoms related to disease process for when to call 911? Yes   Is the patient/caregiver able to teach back the hierarchy of who to call/visit for symptoms/problems? PCP, Specialist, Home health nurse, Urgent Care, ED, 911 Yes   If the patient is a current smoker,  are they able to teach back resources for cessation? Not a smoker   Week 2 Call Completed? Yes          HANSEL PEÑA - Registered Nurse

## 2022-12-16 ENCOUNTER — READMISSION MANAGEMENT (OUTPATIENT)
Dept: CALL CENTER | Facility: HOSPITAL | Age: 62
End: 2022-12-16

## 2022-12-16 NOTE — OUTREACH NOTE
Medical Week 3 Survey    Flowsheet Row Responses   [facility] patient discharged from? [facility]   Does the patient have one of the following disease processes/diagnoses(primary or secondary)? Other   Week 3 attempt successful? Yes   Call start time 1305   Call end time 1310   Discharge diagnosis Hypertensive emergency   Is patient permission given to speak with other caregiver? Yes   Person spoke with today (if not patient) and relationship Cliff Leiva (healthcare surrogate) Significant Other    Meds reviewed with patient/caregiver? Yes   Is the patient having any side effects they believe may be caused by any medication additions or changes? No   Does the patient have all medications ordered at discharge? Yes   Is the patient taking all medications as directed (includes completed medication regime)? Yes   Does the patient have a primary care provider?  Yes   Does the patient have an appointment with their PCP within 7 days of discharge? Yes   Has the patient kept scheduled appointments due by today? Yes   Has home health visited the patient within 72 hours of discharge? N/A   Psychosocial issues? No   Did the patient receive a copy of their discharge instructions? Yes   Nursing interventions Reviewed instructions with patient   What is the patient's perception of their health status since discharge? Same   Is the patient/caregiver able to teach back signs and symptoms related to disease process for when to call PCP? Yes   Is the patient/caregiver able to teach back signs and symptoms related to disease process for when to call 911? Yes   Is the patient/caregiver able to teach back the hierarchy of who to call/visit for symptoms/problems? PCP, Specialist, Home health nurse, Urgent Care, ED, 911 Yes   If the patient is a current smoker, are they able to teach back resources for cessation? Not a smoker   Week 3 Call Completed? Yes   Graduated Yes   Is the patient interested in additional calls from an ambulatory  ?  NOTE:  applies to high risk patients requiring additional follow-up. No   Did the patient feel the follow up calls were helpful during their recovery period? Yes   Was the number of calls appropriate? Yes   Wrap up additional comments Apppreciates the call. Patient is comfortable. No needs at this time.           LEATHA TIAN - Registered Nurse

## 2022-12-30 NOTE — OP NOTE
INSERTION PERITONEAL DIALYSIS CATHETER LAPAROSCOPIC  Procedure Note    Reny Elena  12/16/2021    Pre-op Diagnosis:   Chronic kidney disease, unspecified CKD stage [N18.9]    Post-op Diagnosis: same        Procedure(s):  INSERTION PERITONEAL DIALYSIS CATHETER LAPAROSCOPIC    Surgeon(s):  Edy Glover MD    Anesthesia: General    Staff:   Circulator: Adriana Rodriguez RN  Scrub Person: Christal Anderson  Vendor Representative: Claudine Miranda  Assistant: Curtis Szymanski    Estimated Blood Loss: minimal    Specimens:                * No orders in the log *      Drains:   Peritoneal Dialysis Catheter Right lower abdomen (Active)   Site Assessment Clean; Dry; Intact 12/16/21 0856   Dressing Intervention New dressing 12/16/21 0856   Dressing Status Clean; Dry; Intact 12/16/21 0856   Dressing Other (Comment) 12/16/21 0856       Procedure: The abdomen was prepped and draped. A 5 mm port was placed at the umbilicus and a 5 mm port placed obliquely in the right mid abdomen. The peritoneal dialysis catheter was threaded through the trocar with the distal cuff just up outside of the peritoneal cavity. The trocar was removed and the catheter tunneled lateral to another small incision with the cuff in the subcutaneous tissue. The incisions were closed with vicryl and the catheter was anchored with a silk suture. The coiled end of the catheter was laying in the pelvis.     Findings:     Complications: none   Grafts / Implants N/A    Edy Glover MD     Date: 12/16/2021  Time: 09:09 EST   Thank you for connecting with us today on the [facility] service of [facility]. If you have any questions after your visit, please feel free to contact us at [phone].    If you do not have a primary care provider or have any questions about your visit, please call the Virtual Health RN at [phone].    If you are experiencing a medical emergency, call 911 immediately.   Symptoms that require immediate attention require a visit at Urgent Care ([place]), Immediate Care Center ([place]) or the Emergency Room of a nearby hospital.  If your symptoms worsen or do not improve, please contact your primary care provider to schedule an in-person visit.       If you have any billing questions please call the billing department.    Phone: [phone]. Hours: Mon. - Thu. 7:30 a.m. - 6 p.m. and Friday 7:30 p.m. - 5 p.m.    Treatment Recommendations:    Rest and push fluids   Tylenol every 4 hours and ibuprofen every 6 hours as needed for pain/fevers  Sudafed daily  Zyrtec daily   Flonase nasal spray, 2 sprays each nostril daily for 10 days   Delsym cough syrup as needed   Tea with honey  Cough drops  Steam inhalation   Vitamin-C 1000 mg daily, vitamin D3 5000 international units daily, and zinc 30 mg daily for immune support    Coronavirus Disease 2019 (COVID-19): Overview  Coronavirus disease 2019 (COVID-19) is an illness that infects the lungs. It's caused by a type of coronavirus. The virus is called SARS-CoV-2. There are many types of coronaviruses. They are a common cause of colds and bronchitis. They can cause a lung infection called pneumonia. Symptoms can range from mild to severe. Some people have no symptoms. These types of viruses are also found in some animals.  Viruses change (mutate) all the time. The changes lead to different forms of a virus. These are called variants. COVID-19 variants may spread more easily from person to person. They may cause milder symptoms. Or they may cause  more severe symptoms.   The virus spreads and infects people easily. It can infect a person more easily if they are not immune to it. The virus most often spreads through droplets of fluid that a person coughs or sneezes into the air. In some cases, you can get it from touching a surface with the virus on it and then touching your eyes, nose, or mouth.     To help prevent spreading the infection, wash your hands often, or use an alcohol-based hand .     To learn more  For the latest from the CDC:     Go to the [facility] website  Call 386-TJV-WKKN ([phone])     What are the symptoms of COVID-19?  Some people have no symptoms. Some have mild symptoms. Others may have severe symptoms. This varies from person to person. Symptoms may start 2 to 14 days after contact with the virus. They can include:  Fever  Chills  Coughing  Trouble breathing or feeling short of breath  Sore throat  Stuffy or runny nose  Headache  Body aches  Tiredness  Nausea, vomiting, diarrhea, or belly pain  New loss of sense of smell or taste  Check your symptoms with the CDC’s Coronavirus Self-.  What are possible complications of COVID-19?  The virus can cause an infection in the lungs. This is called pneumonia. This can lead to death in some cases. Experts are still learning more about COVID-19 problems. Problems may include:  Low blood pressure  Kidney failure  Inflammation of the brain or heart  Rashes  Some people are at higher risk for problems. This includes:  Older adults  People with heart or lung disease  People with diabetes or kidney disease  People with health conditions that limit the immune system  People who take medicines that limit the immune system  Rarely, a child may have a severe complication. This is called multisystem inflammatory syndrome in children (MIS-C). MIS-C seems to be like Kawasaki disease. This is a rare illness. It causes swelling of blood vessels and body organs. MIS can also happen in adults. But  this is less common.    How is COVID-19 diagnosed?  Your healthcare provider will ask:  What symptoms you have  Where you live  If you’ve traveled recently  If you’ve had contact with sick people  If you are vaccinated against COVID-19  If you have had COVID-19  Know your testing options with the CDC's COVID-19 Viral Testing Tool. You may have 1 of these tests for COVID-19:  Viral (molecular) test. You may also hear this called a PCR or RT-PCR test. Viral tests are very accurate. A viral test looks for the genetic material (RNA) of the SARS-CoV-2 virus. There are a few ways to do this. A swab may be wiped inside your nose or throat. Or a long swab may be put into your nose down to the back of your throat. Or a sample of your saliva may be taken. Your test results may be back in 45 minutes to a few hours. This depends on the type of test. Some tests must be sent to a lab. These can take several days for the results. You can now get test kits to use at home. Some of these need a prescription. Follow the instructions in the kit closely if you use a home kit. Some kits show results quickly at home. Others must be sent to a lab for the results.  Antigen test. This can find proteins from the SARS-CoV-2 virus. A swab may be wiped inside your nose or throat. Or a long swab may be put into your nose down to the back of your throat. Some results are back within 15 to 60 minutes. This depends on the type of test. Positive results are very accurate. But false positive results can happen. And the results can be negative even in people with COVID-19. Antigen tests are more likely to miss a COVID-19 infection than a viral (molecular) test. You may need to have a viral test if your antigen test is negative but you have symptoms of COVID-19.  Breath test. This rapid test is not widely available at this time. It finds SARS-CoV-2 infection in the breath. The test is done at providers' offices, hospitals, and mobile testing sites.  You  may have other tests if your provider thinks or confirms that you have COVID-19. These tests may include:  Antibody blood test. This type of test can show if you had the virus in the past. It shows antibodies for the virus in the blood. The accuracy of these tests varies. And they are not available everywhere. An antibody test may not show if you have an infection right now. This is because it can take up to a few weeks for your body to make antibodies. None of the antibody tests can yet be used to tell if a person is immune to the virus.  Sputum culture. If you have a wet cough, you may be asked to cough up a bit of mucus (sputum) from your lungs. This is tested for the virus. It may be tested for pneumonia.  Imaging tests. You may have a chest X-ray or CT scan.  Can you get COVID-19 again?  Yes, you can get COVID-19 more than once. You may not have immunity. You could have lost the immunity. Or you may get COVID-19 from a different strain (variant) of the virus that you are not immune to. But the COVID-19 vaccine helps lower the risk for COVID-19.  Vaccines for COVID-19  The FDA and CDC advise vaccines to help prevent COVID-19. The vaccines can also make the illness less severe. It can keep you from needing to go to the hospital.  And it can prevent the spread of the virus to others. No vaccine is 100% effective at preventing an illness. But getting a vaccine is important. COVID-19 vaccines are available for people as young as 6 months old. Pregnant or breastfeeding people can have the vaccine. Vaccines are given as a primary series.Boosters are given later to help with protection.  The vaccines are given as a shot (injection) into the muscle. Ask your healthcare provider which vaccine is best for you and your family. There is a 1-dose vaccine from [employer] (J&J) for people ages 18 and older. Or a 2-dose vaccine from Novavax for people ages 12 and older. Two-dose Pfizer and Moderna vaccines are for people  as young as 6 months old. They are given in several doses a few weeks apart. People with a weak immune system may have other advice. Talk with your healthcare provider about which vaccine is best for you and your family.  COVID-19 vaccine booster shots  People age 5 or older can get a COVID-19 booster shot. It's given a few months after their primary series. Boosters can help with protection against COVID-19 that may have decreased over time.  Booster advice varies by vaccine, age, health, and COVID-19 variants. Talk with your provider about your risk and when to get a booster.  How is COVID-19 treated?  The best treatments right now are those to help your body while it fights the virus. This is called supportive care. It includes:  Rest.This helps your body fight the illness.  Fluids. Try to drink 6 to 8 glasses of fluids every day. Ask your provider which drinks are best for you. Don't have drinks with caffeine or alcohol.  Over-the-counter (OTC) medicine. These are used to help ease pain and reduce fever. Ask your provider which OTC medicine is safe for you to use.  Talk with your provider if you have confirmed COVID-19. You may qualify for medicines approved by the FDA to prevent severe COVID-19 infection.  You may need to stay in the hospital for severe illness. Your care may include:  IV fluids. These are given through a vein. This helps to replace fluids in your body.  Oxygen. You may be given extra oxygen. Or you may be put on a breathing machine (ventilator). This is done so you get enough oxygen in your body.  Prone positioning. Your healthcare team may regularly turn you on your stomach. This is called prone positioning. It helps increase the amount of oxygen you get to your lungs. Follow their instructions on position changes while you're in the hospital and at home.  Antivirals and monoclonal antibodies. The FDA has approved certain antivirals and monoclonal antibodies to treat COVID-19. These  treatments are for people who are more likely to get very sick. These treatments are not available for everyone. Talk with your healthcare provider to learn more.  Antivirals stop the SARS-CoV-2 virus from spreading in the body.  Monoclonal antibodies help the immune system fight the virus.     Steroids or other anti-inflammatory medicines. These are used to lessen the inflammation that some people with COVID-19 have. Inflammation can lead to more trouble breathing. It can cause other complications or death.  COVID-19 convalescent plasma. Plasma is the liquid part of blood. People who had COVID-19 may be asked to donate plasma. This is called COVID-19 convalescent plasma. The plasma may have antibodies. These can help fight COVID-19 in people who are very ill with it. Check with your provider to see if this is an option in your area.  Are you at risk for COVID-19?  You are at risk for COVID-19 if any of these apply to you:  You live in or traveled to an area with cases of COVID-19  You had close contact (within 6 feet) with someone who had COVID-19  COVID-19 may be spread by people who don't show symptoms.  Date last modified: 9/08/2022   [employer] last reviewed this educational content on 9/1/2021 © 2000-2022 [employer]. All rights reserved. This information is not intended as a substitute for professional medical care. Always follow your healthcare professional's instructions.           What you need to know about coronavirus disease 2019 (COVID-19)       Covid-19 Patient information sheet

## 2023-02-06 ENCOUNTER — HOSPITAL ENCOUNTER (EMERGENCY)
Facility: HOSPITAL | Age: 63
Discharge: HOME | End: 2023-02-06
Attending: EMERGENCY MEDICINE | Admitting: EMERGENCY MEDICINE
Payer: MEDICAID

## 2023-02-06 ENCOUNTER — APPOINTMENT (OUTPATIENT)
Dept: CT IMAGING | Facility: HOSPITAL | Age: 63
End: 2023-02-06
Payer: MEDICAID

## 2023-02-06 ENCOUNTER — APPOINTMENT (OUTPATIENT)
Dept: GENERAL RADIOLOGY | Facility: HOSPITAL | Age: 63
End: 2023-02-06
Payer: MEDICAID

## 2023-02-06 VITALS
WEIGHT: 93 LBS | BODY MASS INDEX: 18.26 KG/M2 | SYSTOLIC BLOOD PRESSURE: 141 MMHG | DIASTOLIC BLOOD PRESSURE: 86 MMHG | TEMPERATURE: 98.3 F | RESPIRATION RATE: 18 BRPM | HEIGHT: 60 IN | OXYGEN SATURATION: 98 % | HEART RATE: 62 BPM

## 2023-02-06 DIAGNOSIS — N18.9 ACUTE RENAL FAILURE SUPERIMPOSED ON CHRONIC KIDNEY DISEASE, ON CHRONIC DIALYSIS, UNSPECIFIED ACUTE RENAL FAILURE TYPE: Primary | ICD-10-CM

## 2023-02-06 DIAGNOSIS — Z99.2 ACUTE RENAL FAILURE SUPERIMPOSED ON CHRONIC KIDNEY DISEASE, ON CHRONIC DIALYSIS, UNSPECIFIED ACUTE RENAL FAILURE TYPE: Primary | ICD-10-CM

## 2023-02-06 DIAGNOSIS — N30.00 ACUTE CYSTITIS WITHOUT HEMATURIA: ICD-10-CM

## 2023-02-06 DIAGNOSIS — N17.9 ACUTE RENAL FAILURE SUPERIMPOSED ON CHRONIC KIDNEY DISEASE, ON CHRONIC DIALYSIS, UNSPECIFIED ACUTE RENAL FAILURE TYPE: Primary | ICD-10-CM

## 2023-02-06 LAB
ALBUMIN SERPL-MCNC: 3.4 G/DL (ref 3.5–5.2)
ALBUMIN/GLOB SERPL: 1.3 G/DL
ALP SERPL-CCNC: 94 U/L (ref 39–117)
ALT SERPL W P-5'-P-CCNC: 31 U/L (ref 1–33)
ANION GAP SERPL CALCULATED.3IONS-SCNC: 14.9 MMOL/L (ref 5–15)
AST SERPL-CCNC: 29 U/L (ref 1–32)
BACTERIA UR QL AUTO: ABNORMAL /HPF
BASOPHILS # BLD AUTO: 0.05 10*3/MM3 (ref 0–0.2)
BASOPHILS NFR BLD AUTO: 0.6 % (ref 0–1.5)
BILIRUB SERPL-MCNC: 0.3 MG/DL (ref 0–1.2)
BILIRUB UR QL STRIP: NEGATIVE
BUN SERPL-MCNC: 31 MG/DL (ref 8–23)
BUN/CREAT SERPL: 5.9 (ref 7–25)
CALCIUM SPEC-SCNC: 8.2 MG/DL (ref 8.6–10.5)
CHLORIDE SERPL-SCNC: 97 MMOL/L (ref 98–107)
CLARITY UR: ABNORMAL
CO2 SERPL-SCNC: 24.1 MMOL/L (ref 22–29)
COLOR UR: ABNORMAL
CREAT SERPL-MCNC: 5.29 MG/DL (ref 0.57–1)
CRP SERPL-MCNC: 8.43 MG/DL (ref 0–0.5)
DEPRECATED RDW RBC AUTO: 46.2 FL (ref 37–54)
EGFRCR SERPLBLD CKD-EPI 2021: 8.5 ML/MIN/1.73
EOSINOPHIL # BLD AUTO: 0.03 10*3/MM3 (ref 0–0.4)
EOSINOPHIL NFR BLD AUTO: 0.4 % (ref 0.3–6.2)
ERYTHROCYTE [DISTWIDTH] IN BLOOD BY AUTOMATED COUNT: 13.1 % (ref 12.3–15.4)
FLUAV RNA RESP QL NAA+PROBE: NOT DETECTED
FLUBV RNA ISLT QL NAA+PROBE: NOT DETECTED
GLOBULIN UR ELPH-MCNC: 2.7 GM/DL
GLUCOSE SERPL-MCNC: 238 MG/DL (ref 65–99)
GLUCOSE UR STRIP-MCNC: ABNORMAL MG/DL
HCT VFR BLD AUTO: 44.5 % (ref 34–46.6)
HGB BLD-MCNC: 14.7 G/DL (ref 12–15.9)
HGB UR QL STRIP.AUTO: NEGATIVE
HOLD SPECIMEN: NORMAL
HOLD SPECIMEN: NORMAL
HYALINE CASTS UR QL AUTO: ABNORMAL /LPF
IMM GRANULOCYTES # BLD AUTO: 0.11 10*3/MM3 (ref 0–0.05)
IMM GRANULOCYTES NFR BLD AUTO: 1.4 % (ref 0–0.5)
KETONES UR QL STRIP: ABNORMAL
LEUKOCYTE ESTERASE UR QL STRIP.AUTO: ABNORMAL
LYMPHOCYTES # BLD AUTO: 1.33 10*3/MM3 (ref 0.7–3.1)
LYMPHOCYTES NFR BLD AUTO: 16.5 % (ref 19.6–45.3)
MCH RBC QN AUTO: 31.8 PG (ref 26.6–33)
MCHC RBC AUTO-ENTMCNC: 33 G/DL (ref 31.5–35.7)
MCV RBC AUTO: 96.3 FL (ref 79–97)
MONOCYTES # BLD AUTO: 0.47 10*3/MM3 (ref 0.1–0.9)
MONOCYTES NFR BLD AUTO: 5.8 % (ref 5–12)
NEUTROPHILS NFR BLD AUTO: 6.05 10*3/MM3 (ref 1.7–7)
NEUTROPHILS NFR BLD AUTO: 75.3 % (ref 42.7–76)
NITRITE UR QL STRIP: NEGATIVE
NRBC BLD AUTO-RTO: 0 /100 WBC (ref 0–0.2)
NT-PROBNP SERPL-MCNC: 3528 PG/ML (ref 0–900)
PH UR STRIP.AUTO: <=5 [PH] (ref 5–8)
PLATELET # BLD AUTO: 160 10*3/MM3 (ref 140–450)
PMV BLD AUTO: 10 FL (ref 6–12)
POTASSIUM SERPL-SCNC: 3.7 MMOL/L (ref 3.5–5.2)
PROT SERPL-MCNC: 6.1 G/DL (ref 6–8.5)
PROT UR QL STRIP: ABNORMAL
RBC # BLD AUTO: 4.62 10*6/MM3 (ref 3.77–5.28)
RBC # UR STRIP: ABNORMAL /HPF
REF LAB TEST METHOD: ABNORMAL
SARS-COV-2 RNA PNL SPEC NAA+PROBE: NOT DETECTED
SODIUM SERPL-SCNC: 136 MMOL/L (ref 136–145)
SP GR UR STRIP: 1.02 (ref 1–1.03)
SQUAMOUS #/AREA URNS HPF: ABNORMAL /HPF
UROBILINOGEN UR QL STRIP: ABNORMAL
WBC # UR STRIP: ABNORMAL /HPF
WBC NRBC COR # BLD: 8.04 10*3/MM3 (ref 3.4–10.8)
WHOLE BLOOD HOLD COAG: NORMAL
WHOLE BLOOD HOLD SPECIMEN: NORMAL

## 2023-02-06 PROCEDURE — 83880 ASSAY OF NATRIURETIC PEPTIDE: CPT | Performed by: PHYSICIAN ASSISTANT

## 2023-02-06 PROCEDURE — 87636 SARSCOV2 & INF A&B AMP PRB: CPT | Performed by: PHYSICIAN ASSISTANT

## 2023-02-06 PROCEDURE — 36415 COLL VENOUS BLD VENIPUNCTURE: CPT

## 2023-02-06 PROCEDURE — 74176 CT ABD & PELVIS W/O CONTRAST: CPT

## 2023-02-06 PROCEDURE — 99283 EMERGENCY DEPT VISIT LOW MDM: CPT

## 2023-02-06 PROCEDURE — 70450 CT HEAD/BRAIN W/O DYE: CPT

## 2023-02-06 PROCEDURE — 71045 X-RAY EXAM CHEST 1 VIEW: CPT

## 2023-02-06 PROCEDURE — 96365 THER/PROPH/DIAG IV INF INIT: CPT

## 2023-02-06 PROCEDURE — 25010000002 CEFTRIAXONE PER 250 MG: Performed by: PHYSICIAN ASSISTANT

## 2023-02-06 PROCEDURE — 85025 COMPLETE CBC W/AUTO DIFF WBC: CPT | Performed by: PHYSICIAN ASSISTANT

## 2023-02-06 PROCEDURE — 81001 URINALYSIS AUTO W/SCOPE: CPT | Performed by: PHYSICIAN ASSISTANT

## 2023-02-06 PROCEDURE — 80053 COMPREHEN METABOLIC PANEL: CPT | Performed by: PHYSICIAN ASSISTANT

## 2023-02-06 PROCEDURE — 71045 X-RAY EXAM CHEST 1 VIEW: CPT | Performed by: RADIOLOGY

## 2023-02-06 PROCEDURE — 74176 CT ABD & PELVIS W/O CONTRAST: CPT | Performed by: RADIOLOGY

## 2023-02-06 PROCEDURE — 86140 C-REACTIVE PROTEIN: CPT | Performed by: PHYSICIAN ASSISTANT

## 2023-02-06 RX ORDER — SODIUM CHLORIDE 0.9 % (FLUSH) 0.9 %
10 SYRINGE (ML) INJECTION AS NEEDED
Status: DISCONTINUED | OUTPATIENT
Start: 2023-02-06 | End: 2023-02-06 | Stop reason: HOSPADM

## 2023-02-06 RX ORDER — CEFDINIR 300 MG/1
300 CAPSULE ORAL DAILY
Qty: 10 CAPSULE | Refills: 0 | Status: SHIPPED | OUTPATIENT
Start: 2023-02-06 | End: 2023-02-16

## 2023-02-06 RX ADMIN — CEFTRIAXONE 2 G: 2 INJECTION, POWDER, FOR SOLUTION INTRAMUSCULAR; INTRAVENOUS at 18:09

## 2023-02-06 RX ADMIN — SODIUM CHLORIDE 500 ML: 9 INJECTION, SOLUTION INTRAVENOUS at 17:40

## 2023-02-06 NOTE — ED NOTES
MEDICAL SCREENING:    Reason for Visit: BUN/CR abnormal lab     Patient initially seen in triage.  The patient was advised further evaluation and diagnostic testing will be needed, some of the treatment and testing will be initiated in the lobby in order to begin the process.  The patient will be returned to the waiting area for the time being and possibly be re-assessed by a subsequent ED provider.  The patient will be brought back to the treatment area in as timely manner as possible.         Veronica Glez PA  02/06/23 1142

## 2023-02-06 NOTE — ED PROVIDER NOTES
Subjective   History of Present Illness  This is a 62 year old female patient who presents to the ER with chief complaint of abnormal lab. Keyla presents with her. PMH significant for CKD on peritoneal home dialysis daily, DM requiring insulin, HTN, HLD. For the past 1 week, she has had weakness to the point that she can't get up and walk to the bathroom. She went to have blood drawn at her PCP's office on Friday and they called today and told her that her creatinine was very high and she needed to come to the ER immediately. Dr. Sandy is her neprhologist.         Review of Systems   Constitutional: Negative for fever.   HENT: Negative.    Respiratory: Negative.    Cardiovascular: Negative.  Negative for chest pain.   Gastrointestinal: Negative.  Negative for abdominal pain.   Endocrine: Negative.    Genitourinary: Negative.  Negative for dysuria.   Skin: Negative.    Neurological: Positive for weakness. Negative for dizziness, tremors, seizures, syncope, facial asymmetry, speech difficulty, light-headedness, numbness and headaches.   Psychiatric/Behavioral: Negative.    All other systems reviewed and are negative.      Past Medical History:   Diagnosis Date   • Arthritis    • Closed fracture of right fibula and tibia 12/25/2018   • Depression    • Diabetic ulcer of left foot associated with type 2 diabetes mellitus ([facility]) 6/23/2017   • Diastolic dysfunction    • Disease of thyroid gland    • Elevated cholesterol    • End stage renal disease ([facility])    • Essential hypertension    • GERD (gastroesophageal reflux disease)    • History of transfusion    • Hyperlipidemia    • Iron deficiency anemia 12/30/2018   • PAD (peripheral artery disease) ([facility])    • Renal failure    • Type 2 diabetes mellitus with hyperglycemia, with long-term current use of insulin ([facility]) 12/30/2018       Allergies   Allergen Reactions   • Penicillins Hives and GI Intolerance     Patient has received cefazolin, ceftriaxone and cefepime during past  admissions.   • Codeine Hives, Rash and GI Intolerance     Pt tolerates Norco @ home   • Sulfa Antibiotics Hives, Rash and GI Intolerance     NAUSEA TOO       Past Surgical History:   Procedure Laterality Date   • ABDOMINAL SURGERY     • BACK SURGERY      Post spinal diskectomy, osteophytectomy in one lumbar interspace   • CATARACT EXTRACTION      Left    •  SECTION     • DENTAL PROCEDURE     • ENDOSCOPY     • FRACTURE SURGERY      right leg   • HYSTERECTOMY     • INCISION AND DRAINAGE LEG Left 4/15/2017    Procedure: INCISION AND DRAINAGE LOWER EXTREMITY;  Surgeon: Basilio Morris MD;  Location: [facility] OR;  Service:    • INCISION AND DRAINAGE LEG Left 2017    Procedure: Irrigation and Debridment abcess diabetic wound left foot with deep culture;  Surgeon: Basilio Morris MD;  Location: [facility] OR;  Service:    • INSERTION PERITONEAL DIALYSIS CATHETER N/A 2021    Procedure: INSERTION PERITONEAL DIALYSIS CATHETER LAPAROSCOPIC;  Surgeon: Edy Glover MD;  Location: [facility] OR;  Service: General;  Laterality: N/A;   • KNEE ARTHROSCOPY Right    • ORIF TIBIA FRACTURE Right 2018    Procedure: TIBIAL  FRACTURE OPEN REDUCTION INTERNAL FIXATION;  Surgeon: Jung Barragan MD;  Location: [facility] OR;  Service: Orthopedics   • TOE AMPUTATION Right     5th   • TUBAL ABDOMINAL LIGATION         Family History   Problem Relation Age of Onset   • Heart disease Mother    • Cancer Mother    • COPD Mother    • Diabetes Other    • Depression Other        Social History     Socioeconomic History   • Marital status:    Tobacco Use   • Smoking status: Never   • Smokeless tobacco: Never   Vaping Use   • Vaping Use: Never used   Substance and Sexual Activity   • Alcohol use: No   • Drug use: No   • Sexual activity: Defer           Objective   Physical Exam  Vitals and nursing note reviewed.   Constitutional:       General: She is not in acute distress.     Appearance: She is well-developed. She is not  diaphoretic.   HENT:      Head: Normocephalic and atraumatic.      Right Ear: External ear normal.      Left Ear: External ear normal.      Nose: Nose normal.   Eyes:      Conjunctiva/sclera: Conjunctivae normal.      Pupils: Pupils are equal, round, and reactive to light.   Neck:      Vascular: No JVD.      Trachea: No tracheal deviation.   Cardiovascular:      Rate and Rhythm: Normal rate and regular rhythm.      Heart sounds: Normal heart sounds. No murmur heard.  Pulmonary:      Effort: Pulmonary effort is normal. No respiratory distress.      Breath sounds: Normal breath sounds. No wheezing.   Abdominal:      General: Bowel sounds are normal.      Palpations: Abdomen is soft.      Tenderness: There is no abdominal tenderness.   Musculoskeletal:         General: No deformity. Normal range of motion.      Cervical back: Normal range of motion and neck supple.   Skin:     General: Skin is warm and dry.      Coloration: Skin is not pale.      Findings: No erythema or rash.   Neurological:      Mental Status: She is alert and oriented to person, place, and time.      Cranial Nerves: No cranial nerve deficit.   Psychiatric:         Behavior: Behavior normal.         Thought Content: Thought content normal.         Procedures       Results for orders placed or performed during the hospital encounter of 02/06/23   COVID-19 and FLU A/B PCR - Swab, Nasopharynx    Specimen: Nasopharynx; Swab   Result Value Ref Range    COVID19 Not Detected Not Detected - Ref. Range    Influenza A PCR Not Detected Not Detected    Influenza B PCR Not Detected Not Detected   Comprehensive Metabolic Panel    Specimen: Blood   Result Value Ref Range    Glucose 238 (H) 65 - 99 mg/dL    BUN 31 (H) 8 - 23 mg/dL    Creatinine 5.29 (H) 0.57 - 1.00 mg/dL    Sodium 136 136 - 145 mmol/L    Potassium 3.7 3.5 - 5.2 mmol/L    Chloride 97 (L) 98 - 107 mmol/L    CO2 24.1 22.0 - 29.0 mmol/L    Calcium 8.2 (L) 8.6 - 10.5 mg/dL    Total Protein 6.1 6.0 - 8.5  g/dL    Albumin 3.4 (L) 3.5 - 5.2 g/dL    ALT (SGPT) 31 1 - 33 U/L    AST (SGOT) 29 1 - 32 U/L    Alkaline Phosphatase 94 39 - 117 U/L    Total Bilirubin 0.3 0.0 - 1.2 mg/dL    Globulin 2.7 gm/dL    A/G Ratio 1.3 g/dL    BUN/Creatinine Ratio 5.9 (L) 7.0 - 25.0    Anion Gap 14.9 5.0 - 15.0 mmol/L    eGFR 8.5 (L) >60.0 mL/min/1.73   Urinalysis With Microscopic If Indicated (No Culture) - Urine, Clean Catch    Specimen: Urine, Clean Catch   Result Value Ref Range    Color, UA Dark Yellow (A) Yellow, Straw    Appearance, UA Cloudy (A) Clear    pH, UA <=5.0 5.0 - 8.0    Specific Gravity, UA 1.020 1.005 - 1.030    Glucose,  mg/dL (2+) (A) Negative    Ketones, UA Trace (A) Negative    Bilirubin, UA Negative Negative    Blood, UA Negative Negative    Protein, UA >=300 mg/dL (3+) (A) Negative    Leuk Esterase, UA Trace (A) Negative    Nitrite, UA Negative Negative    Urobilinogen, UA 0.2 E.U./dL 0.2 - 1.0 E.U./dL   CBC Auto Differential    Specimen: Blood   Result Value Ref Range    WBC 8.04 3.40 - 10.80 10*3/mm3    RBC 4.62 3.77 - 5.28 10*6/mm3    Hemoglobin 14.7 12.0 - 15.9 g/dL    Hematocrit 44.5 34.0 - 46.6 %    MCV 96.3 79.0 - 97.0 fL    MCH 31.8 26.6 - 33.0 pg    MCHC 33.0 31.5 - 35.7 g/dL    RDW 13.1 12.3 - 15.4 %    RDW-SD 46.2 37.0 - 54.0 fl    MPV 10.0 6.0 - 12.0 fL    Platelets 160 140 - 450 10*3/mm3    Neutrophil % 75.3 42.7 - 76.0 %    Lymphocyte % 16.5 (L) 19.6 - 45.3 %    Monocyte % 5.8 5.0 - 12.0 %    Eosinophil % 0.4 0.3 - 6.2 %    Basophil % 0.6 0.0 - 1.5 %    Immature Grans % 1.4 (H) 0.0 - 0.5 %    Neutrophils, Absolute 6.05 1.70 - 7.00 10*3/mm3    Lymphocytes, Absolute 1.33 0.70 - 3.10 10*3/mm3    Monocytes, Absolute 0.47 0.10 - 0.90 10*3/mm3    Eosinophils, Absolute 0.03 0.00 - 0.40 10*3/mm3    Basophils, Absolute 0.05 0.00 - 0.20 10*3/mm3    Immature Grans, Absolute 0.11 (H) 0.00 - 0.05 10*3/mm3    nRBC 0.0 0.0 - 0.2 /100 WBC   BNP    Specimen: Blood   Result Value Ref Range    proBNP 3,528.0 (H)  0.0 - 900.0 pg/mL   C-reactive Protein    Specimen: Blood   Result Value Ref Range    C-Reactive Protein 8.43 (H) 0.00 - 0.50 mg/dL   Urinalysis, Microscopic Only - Urine, Clean Catch    Specimen: Urine, Clean Catch   Result Value Ref Range    RBC, UA 0-2 None Seen, 0-2 /HPF    WBC, UA 3-5 (A) None Seen, 0-2 /HPF    Bacteria, UA 1+ (A) None Seen /HPF    Squamous Epithelial Cells, UA 0-2 None Seen, 0-2 /HPF    Hyaline Casts, UA None Seen None Seen /LPF    Methodology Manual Light Microscopy    Green Top (Gel)   Result Value Ref Range    Extra Tube Hold for add-ons.    Lavender Top   Result Value Ref Range    Extra Tube hold for add-on    Gold Top - SST   Result Value Ref Range    Extra Tube Hold for add-ons.    Light Blue Top   Result Value Ref Range    Extra Tube Hold for add-ons.        ED Course  ED Course as of 02/06/23 1915   Mon Feb 06, 2023   1514 CT Abdomen Pelvis Without Contrast  IMPRESSION:  1.  An intraperitoneal dialysis catheter is noted in the pelvis there is  a moderate amount of probable dialysate.  2.  Tiny amount of punctate free air noted anteriorly likely result of  intraperitoneal shunt catheter.  3.  Mild L1 vertebral body superior endplate compression deformity of  indeterminate age.  4.  Degenerative changes lumbar spine as described.     This report was finalized on 2/6/2023 3:02 PM by Dr. Harrison Biswas MD [MM]   1602 XR Chest 1 View  IMPRESSION:    No acute cardiopulmonary findings identified.     This report was finalized on 2/6/2023 3:24 PM by Dr. Harrison Biswas MD [MM]   8596 Spoke with Dr. Arroyo who says there is no reason for admission for this type of creatinine elevation. He says I need to check for sources of infection and treat those but otherwise, she can be d/c home to continue her dialysis.  [MM]   6807 CT Head Without Contrast  IMPRESSION:  No acute intracranial abnormality.  CT cannot exclude acute infarct.     Signer Name: Javsir Green MD   Signed: 2/6/2023 5:31 PM    Workstation Name: [id number]    [facility] [MM]   1902 Patient diagnosed with acute on chronic kidney failure and UTI. Will be d/c home with rx for omnicef and instructions to f/u with PCP or return to ER if symptoms worsen.  [MM]      ED Course User Index  [MM] Rivka Montana PA                                           Medical Decision Making    This is a 62 year old female patient who presents to the ER with chief complaint of abnormal lab. Keyla presents with her. PMH significant for CKD on peritoneal home dialysis daily, DM requiring insulin, HTN, HLD. For the past 1 week, she has had weakness to the point that she can't get up and walk to the bathroom. She went to have blood drawn at her PCP's office on Friday and they called today and told her that her creatinine was very high and she needed to come to the ER immediately. Dr. Sandy is her neprhologist.         Acute cystitis without hematuria: acute illness or injury  Acute renal failure superimposed on chronic kidney disease, on chronic dialysis, unspecified acute renal failure type (HCC): chronic illness or injury  Amount and/or Complexity of Data Reviewed  Labs: ordered. Decision-making details documented in ED Course.  Radiology: ordered. Decision-making details documented in ED Course.      Risk  Prescription drug management.          Final diagnoses:   Acute renal failure superimposed on chronic kidney disease, on chronic dialysis, unspecified acute renal failure type (HCC)   Acute cystitis without hematuria       ED Disposition  ED Disposition     ED Disposition   Discharge    Condition   Stable    Comment   --             Susan Stephens, APRN  [address] [id number]  [phone]    In 2 days           Medication List      New Prescriptions    cefdinir 300 MG capsule  Commonly known as: OMNICEF  Take 1 capsule by mouth Daily for 10 days.           Where to Get Your Medications      These medications were sent  to [facility] - [place] - [address] - [phone] PH - [phone] FX  [address] [facility] [address] [place] [address]    Phone: [phone]   · cefdinir 300 MG capsule          Rivka Montana PA  02/06/23 1915

## 2023-02-07 NOTE — DISCHARGE INSTRUCTIONS
Please take your antibiotics and continue your dialysis. Please follow up with your PCP in 2 days or return to ER if symptoms worsen.

## 2023-03-04 ENCOUNTER — APPOINTMENT (OUTPATIENT)
Dept: CT IMAGING | Facility: HOSPITAL | Age: 63
End: 2023-03-04
Payer: MEDICARE

## 2023-03-04 ENCOUNTER — HOSPITAL ENCOUNTER (EMERGENCY)
Facility: HOSPITAL | Age: 63
Discharge: HOME | End: 2023-03-05
Attending: EMERGENCY MEDICINE | Admitting: EMERGENCY MEDICINE
Payer: MEDICARE

## 2023-03-04 DIAGNOSIS — N18.9 CHRONIC RENAL FAILURE, UNSPECIFIED CKD STAGE: ICD-10-CM

## 2023-03-04 DIAGNOSIS — R91.8 OPACITIES OF BOTH LUNGS PRESENT ON CHEST X-RAY: ICD-10-CM

## 2023-03-04 DIAGNOSIS — R19.7 DIARRHEA, UNSPECIFIED TYPE: ICD-10-CM

## 2023-03-04 DIAGNOSIS — R11.2 NAUSEA AND VOMITING, UNSPECIFIED VOMITING TYPE: Primary | ICD-10-CM

## 2023-03-04 LAB
ALBUMIN SERPL-MCNC: 3.1 G/DL (ref 3.5–5.2)
ALBUMIN/GLOB SERPL: 1 G/DL
ALP SERPL-CCNC: 86 U/L (ref 39–117)
ALT SERPL W P-5'-P-CCNC: 38 U/L (ref 1–33)
ANION GAP SERPL CALCULATED.3IONS-SCNC: 9.9 MMOL/L (ref 5–15)
APTT PPP: 22.4 SECONDS (ref 26.5–34.5)
AST SERPL-CCNC: 25 U/L (ref 1–32)
BASOPHILS # BLD AUTO: 0.08 10*3/MM3 (ref 0–0.2)
BASOPHILS NFR BLD AUTO: 1.3 % (ref 0–1.5)
BILIRUB SERPL-MCNC: 0.2 MG/DL (ref 0–1.2)
BUN SERPL-MCNC: 28 MG/DL (ref 8–23)
BUN/CREAT SERPL: 6.6 (ref 7–25)
CALCIUM SPEC-SCNC: 8.4 MG/DL (ref 8.6–10.5)
CHLORIDE SERPL-SCNC: 95 MMOL/L (ref 98–107)
CK SERPL-CCNC: 51 U/L (ref 20–180)
CO2 SERPL-SCNC: 26.1 MMOL/L (ref 22–29)
CREAT SERPL-MCNC: 4.26 MG/DL (ref 0.57–1)
CRP SERPL-MCNC: 2.07 MG/DL (ref 0–0.5)
D-LACTATE SERPL-SCNC: 1.5 MMOL/L (ref 0.5–2)
DEPRECATED RDW RBC AUTO: 45.1 FL (ref 37–54)
EGFRCR SERPLBLD CKD-EPI 2021: 11.1 ML/MIN/1.73
EOSINOPHIL # BLD AUTO: 0.29 10*3/MM3 (ref 0–0.4)
EOSINOPHIL NFR BLD AUTO: 4.9 % (ref 0.3–6.2)
ERYTHROCYTE [DISTWIDTH] IN BLOOD BY AUTOMATED COUNT: 12.5 % (ref 12.3–15.4)
ERYTHROCYTE [SEDIMENTATION RATE] IN BLOOD: 38 MM/HR (ref 0–30)
ETHANOL BLD-MCNC: <10 MG/DL (ref 0–10)
ETHANOL UR QL: <0.01 %
FLUAV RNA RESP QL NAA+PROBE: NOT DETECTED
FLUBV RNA RESP QL NAA+PROBE: NOT DETECTED
GEN 5 2HR TROPONIN T REFLEX: 87 NG/L
GLOBULIN UR ELPH-MCNC: 3.1 GM/DL
GLUCOSE SERPL-MCNC: 246 MG/DL (ref 65–99)
HCT VFR BLD AUTO: 39.3 % (ref 34–46.6)
HGB BLD-MCNC: 12.7 G/DL (ref 12–15.9)
IMM GRANULOCYTES # BLD AUTO: 0.02 10*3/MM3 (ref 0–0.05)
IMM GRANULOCYTES NFR BLD AUTO: 0.3 % (ref 0–0.5)
INR PPP: 0.99 (ref 0.9–1.1)
LYMPHOCYTES # BLD AUTO: 1.6 10*3/MM3 (ref 0.7–3.1)
LYMPHOCYTES NFR BLD AUTO: 26.9 % (ref 19.6–45.3)
MAGNESIUM SERPL-MCNC: 1.8 MG/DL (ref 1.6–2.4)
MCH RBC QN AUTO: 31.5 PG (ref 26.6–33)
MCHC RBC AUTO-ENTMCNC: 32.3 G/DL (ref 31.5–35.7)
MCV RBC AUTO: 97.5 FL (ref 79–97)
MONOCYTES # BLD AUTO: 0.34 10*3/MM3 (ref 0.1–0.9)
MONOCYTES NFR BLD AUTO: 5.7 % (ref 5–12)
NEUTROPHILS NFR BLD AUTO: 3.62 10*3/MM3 (ref 1.7–7)
NEUTROPHILS NFR BLD AUTO: 60.9 % (ref 42.7–76)
NRBC BLD AUTO-RTO: 0 /100 WBC (ref 0–0.2)
NT-PROBNP SERPL-MCNC: 5404 PG/ML (ref 0–900)
PLATELET # BLD AUTO: 174 10*3/MM3 (ref 140–450)
PMV BLD AUTO: 9.5 FL (ref 6–12)
POTASSIUM SERPL-SCNC: 5.4 MMOL/L (ref 3.5–5.2)
PROCALCITONIN SERPL-MCNC: 0.49 NG/ML (ref 0–0.25)
PROT SERPL-MCNC: 6.2 G/DL (ref 6–8.5)
PROTHROMBIN TIME: 13.3 SECONDS (ref 12.1–14.7)
RBC # BLD AUTO: 4.03 10*6/MM3 (ref 3.77–5.28)
SARS-COV-2 RNA RESP QL NAA+PROBE: NOT DETECTED
SODIUM SERPL-SCNC: 131 MMOL/L (ref 136–145)
T4 FREE SERPL-MCNC: 1.15 NG/DL (ref 0.93–1.7)
TROPONIN T DELTA: -7 NG/L
TROPONIN T SERPL HS-MCNC: 94 NG/L
TSH SERPL DL<=0.05 MIU/L-ACNC: 62.88 UIU/ML (ref 0.27–4.2)
WBC NRBC COR # BLD: 5.95 10*3/MM3 (ref 3.4–10.8)

## 2023-03-04 PROCEDURE — 93005 ELECTROCARDIOGRAM TRACING: CPT | Performed by: EMERGENCY MEDICINE

## 2023-03-04 PROCEDURE — 96374 THER/PROPH/DIAG INJ IV PUSH: CPT

## 2023-03-04 PROCEDURE — 87636 SARSCOV2 & INF A&B AMP PRB: CPT | Performed by: EMERGENCY MEDICINE

## 2023-03-04 PROCEDURE — 85652 RBC SED RATE AUTOMATED: CPT | Performed by: EMERGENCY MEDICINE

## 2023-03-04 PROCEDURE — 74176 CT ABD & PELVIS W/O CONTRAST: CPT

## 2023-03-04 PROCEDURE — 83735 ASSAY OF MAGNESIUM: CPT | Performed by: EMERGENCY MEDICINE

## 2023-03-04 PROCEDURE — 80053 COMPREHEN METABOLIC PANEL: CPT | Performed by: EMERGENCY MEDICINE

## 2023-03-04 PROCEDURE — 71250 CT THORAX DX C-: CPT

## 2023-03-04 PROCEDURE — 25010000002 HYDRALAZINE PER 20 MG: Performed by: EMERGENCY MEDICINE

## 2023-03-04 PROCEDURE — 87040 BLOOD CULTURE FOR BACTERIA: CPT | Performed by: EMERGENCY MEDICINE

## 2023-03-04 PROCEDURE — 84439 ASSAY OF FREE THYROXINE: CPT | Performed by: EMERGENCY MEDICINE

## 2023-03-04 PROCEDURE — 93010 ELECTROCARDIOGRAM REPORT: CPT | Performed by: INTERNAL MEDICINE

## 2023-03-04 PROCEDURE — 99284 EMERGENCY DEPT VISIT MOD MDM: CPT

## 2023-03-04 PROCEDURE — 83880 ASSAY OF NATRIURETIC PEPTIDE: CPT | Performed by: EMERGENCY MEDICINE

## 2023-03-04 PROCEDURE — 96375 TX/PRO/DX INJ NEW DRUG ADDON: CPT

## 2023-03-04 PROCEDURE — 85610 PROTHROMBIN TIME: CPT | Performed by: EMERGENCY MEDICINE

## 2023-03-04 PROCEDURE — 84443 ASSAY THYROID STIM HORMONE: CPT | Performed by: EMERGENCY MEDICINE

## 2023-03-04 PROCEDURE — 86140 C-REACTIVE PROTEIN: CPT | Performed by: EMERGENCY MEDICINE

## 2023-03-04 PROCEDURE — 82550 ASSAY OF CK (CPK): CPT | Performed by: EMERGENCY MEDICINE

## 2023-03-04 PROCEDURE — 84145 PROCALCITONIN (PCT): CPT | Performed by: EMERGENCY MEDICINE

## 2023-03-04 PROCEDURE — 83605 ASSAY OF LACTIC ACID: CPT | Performed by: EMERGENCY MEDICINE

## 2023-03-04 PROCEDURE — 85025 COMPLETE CBC W/AUTO DIFF WBC: CPT | Performed by: EMERGENCY MEDICINE

## 2023-03-04 PROCEDURE — 84484 ASSAY OF TROPONIN QUANT: CPT | Performed by: EMERGENCY MEDICINE

## 2023-03-04 PROCEDURE — 82077 ASSAY SPEC XCP UR&BREATH IA: CPT | Performed by: EMERGENCY MEDICINE

## 2023-03-04 PROCEDURE — 85730 THROMBOPLASTIN TIME PARTIAL: CPT | Performed by: EMERGENCY MEDICINE

## 2023-03-04 PROCEDURE — 36415 COLL VENOUS BLD VENIPUNCTURE: CPT

## 2023-03-04 RX ORDER — HYDROCODONE BITARTRATE AND ACETAMINOPHEN 10; 325 MG/1; MG/1
1 TABLET ORAL ONCE
Status: COMPLETED | OUTPATIENT
Start: 2023-03-04 | End: 2023-03-04

## 2023-03-04 RX ORDER — SODIUM CHLORIDE 0.9 % (FLUSH) 0.9 %
10 SYRINGE (ML) INJECTION AS NEEDED
Status: DISCONTINUED | OUTPATIENT
Start: 2023-03-04 | End: 2023-03-05 | Stop reason: HOSPADM

## 2023-03-04 RX ORDER — HYDRALAZINE HYDROCHLORIDE 20 MG/ML
20 INJECTION INTRAMUSCULAR; INTRAVENOUS ONCE
Status: COMPLETED | OUTPATIENT
Start: 2023-03-04 | End: 2023-03-04

## 2023-03-04 RX ORDER — PANTOPRAZOLE SODIUM 40 MG/10ML
40 INJECTION, POWDER, LYOPHILIZED, FOR SOLUTION INTRAVENOUS ONCE
Status: COMPLETED | OUTPATIENT
Start: 2023-03-04 | End: 2023-03-04

## 2023-03-04 RX ADMIN — SODIUM CHLORIDE 1000 ML: 9 INJECTION, SOLUTION INTRAVENOUS at 20:06

## 2023-03-04 RX ADMIN — HYDRALAZINE HYDROCHLORIDE 20 MG: 20 INJECTION INTRAMUSCULAR; INTRAVENOUS at 20:06

## 2023-03-04 RX ADMIN — PANTOPRAZOLE SODIUM 40 MG: 40 INJECTION, POWDER, FOR SOLUTION INTRAVENOUS at 23:38

## 2023-03-04 RX ADMIN — HYDROCODONE BITARTRATE AND ACETAMINOPHEN 1 TABLET: 10; 325 TABLET ORAL at 23:38

## 2023-03-04 NOTE — ED PROVIDER NOTES
Subjective   History of Present Illness  62-year-old female in the emergency department reporting that she has had diarrhea, nausea, and vomiting for the last 2 weeks.  Has progressively been getting worse so came to the emergency department for further evaluation and treatment.  Denies fever, chills, chest pain, or shortness of breath.  Has significant past medical history of diabetes and is now on peritoneal dialysis 9 hours nightly.  Chronically ill with multiple other comorbidities.    History provided by:  Patient   used: No    Nausea  The primary symptoms include weight loss, fatigue, abdominal pain, nausea, vomiting and diarrhea. Primary symptoms do not include fever, melena, hematemesis, jaundice, hematochezia, dysuria, myalgias, arthralgias or rash. The illness began more than 7 days ago. The onset was gradual. The problem has not changed since onset.  The illness does not include chills, anorexia, dysphagia, odynophagia, bloating, constipation, tenesmus, back pain or itching. Associated medical issues do not include inflammatory bowel disease, GERD, gallstones, liver disease, alcohol abuse, PUD, gastric bypass, bowel resection, irritable bowel syndrome, hemorrhoids or diverticulitis.       Review of Systems   Constitutional: Positive for fatigue and weight loss. Negative for activity change, appetite change, chills, diaphoresis and fever.   HENT: Negative for congestion, ear pain and sore throat.    Eyes: Negative for redness.   Respiratory: Negative for cough, chest tightness, shortness of breath and wheezing.    Cardiovascular: Negative for chest pain, palpitations and leg swelling.   Gastrointestinal: Positive for abdominal pain, diarrhea, nausea and vomiting. Negative for anorexia, bloating, constipation, dysphagia, hematemesis, hematochezia, jaundice and melena.   Genitourinary: Negative for dysuria and urgency.   Musculoskeletal: Negative for arthralgias, back pain, myalgias and  neck pain.   Skin: Negative for itching, pallor, rash and wound.   Neurological: Negative for dizziness, speech difficulty, weakness and headaches.   Psychiatric/Behavioral: Negative for agitation, behavioral problems, confusion and decreased concentration.   All other systems reviewed and are negative.      Past Medical History:   Diagnosis Date   • Arthritis    • Closed fracture of right fibula and tibia 2018   • Depression    • Diabetic ulcer of left foot associated with type 2 diabetes mellitus ([facility]) 2017   • Diastolic dysfunction    • Disease of thyroid gland    • Elevated cholesterol    • End stage renal disease ([facility])    • Essential hypertension    • GERD (gastroesophageal reflux disease)    • History of transfusion    • Hyperlipidemia    • Iron deficiency anemia 2018   • PAD (peripheral artery disease) ([facility])    • Renal failure    • Type 2 diabetes mellitus with hyperglycemia, with long-term current use of insulin ([facility]) 2018       Allergies   Allergen Reactions   • Penicillins Hives and GI Intolerance     Patient has received cefazolin, ceftriaxone and cefepime during past admissions.   • Codeine Hives, Rash and GI Intolerance     Pt tolerates Norco @ home   • Sulfa Antibiotics Hives, Rash and GI Intolerance     NAUSEA TOO       Past Surgical History:   Procedure Laterality Date   • ABDOMINAL SURGERY     • BACK SURGERY      Post spinal diskectomy, osteophytectomy in one lumbar interspace   • CATARACT EXTRACTION      Left    •  SECTION     • DENTAL PROCEDURE     • ENDOSCOPY     • FRACTURE SURGERY      right leg   • HYSTERECTOMY     • INCISION AND DRAINAGE LEG Left 4/15/2017    Procedure: INCISION AND DRAINAGE LOWER EXTREMITY;  Surgeon: Basilio Morris MD;  Location: [facility];  Service:    • INCISION AND DRAINAGE LEG Left 2017    Procedure: Irrigation and Debridment abcess diabetic wound left foot with deep culture;  Surgeon: Basilio Morris MD;  Location: [facility] OR;   Service:    • INSERTION PERITONEAL DIALYSIS CATHETER N/A 12/16/2021    Procedure: INSERTION PERITONEAL DIALYSIS CATHETER LAPAROSCOPIC;  Surgeon: Edy Glover MD;  Location:  [facility] OR;  Service: General;  Laterality: N/A;   • KNEE ARTHROSCOPY Right    • ORIF TIBIA FRACTURE Right 12/28/2018    Procedure: TIBIAL  FRACTURE OPEN REDUCTION INTERNAL FIXATION;  Surgeon: Jung Barragan MD;  Location:  [facility] OR;  Service: Orthopedics   • TOE AMPUTATION Right     5th   • TUBAL ABDOMINAL LIGATION         Family History   Problem Relation Age of Onset   • Heart disease Mother    • Cancer Mother    • COPD Mother    • Diabetes Other    • Depression Other        Social History     Socioeconomic History   • Marital status:    Tobacco Use   • Smoking status: Never   • Smokeless tobacco: Never   Vaping Use   • Vaping Use: Never used   Substance and Sexual Activity   • Alcohol use: No   • Drug use: No   • Sexual activity: Defer           Objective   Physical Exam  Vitals and nursing note reviewed.   Constitutional:       General: She is not in acute distress.     Appearance: Normal appearance. She is well-developed. She is ill-appearing. She is not toxic-appearing or diaphoretic.   HENT:      Head: Normocephalic and atraumatic.      Right Ear: External ear normal.      Left Ear: External ear normal.      Nose: Nose normal.      Mouth/Throat:      Pharynx: No oropharyngeal exudate.      Tonsils: No tonsillar exudate.   Eyes:      General: Lids are normal.      Conjunctiva/sclera: Conjunctivae normal.      Pupils: Pupils are equal, round, and reactive to light.   Neck:      Thyroid: No thyromegaly.   Cardiovascular:      Rate and Rhythm: Normal rate and regular rhythm.      Pulses: Normal pulses.      Heart sounds: Normal heart sounds, S1 normal and S2 normal.   Pulmonary:      Effort: Pulmonary effort is normal. No tachypnea or respiratory distress.      Breath sounds: Normal breath sounds. No decreased breath sounds,  wheezing or rales.   Chest:      Chest wall: No tenderness.   Abdominal:      General: Bowel sounds are normal. There is no distension.      Palpations: Abdomen is soft.      Tenderness: There is generalized abdominal tenderness. There is no guarding or rebound.   Musculoskeletal:         General: No tenderness or deformity. Normal range of motion.      Cervical back: Full passive range of motion without pain, normal range of motion and neck supple.   Lymphadenopathy:      Cervical: No cervical adenopathy.   Skin:     General: Skin is warm and dry.      Coloration: Skin is not pale.      Findings: No erythema or rash.   Neurological:      Mental Status: She is alert and oriented to person, place, and time.      GCS: GCS eye subscore is 4. GCS verbal subscore is 5. GCS motor subscore is 6.      Cranial Nerves: No cranial nerve deficit.      Sensory: No sensory deficit.   Psychiatric:         Speech: Speech normal.         Behavior: Behavior normal.         Thought Content: Thought content normal.         Judgment: Judgment normal.         Procedures           ED Course  ED Course as of 03/07/23 0204   Sat Mar 04, 2023   1930 CT Abdomen Pelvis Without Contrast  IMPRESSION:     1. Interval resolution of the right-sided pleural effusion and development of faint ground glass less than 6 mm nodular opacities and associated groundglass attenuation in the lungs bilaterally, right greater than left, likely inflammatory or infectious.  Follow-up to clearing recommended.  2. Old healed granulomatous disease and background emphysematous change. Less than 6 mm noncalcified nodule in the left upper lobe unchanged over the comparison interval. Documentation of 2 years stability recommended.  3. Cirrhosis and ascites with trace free air, likely related to a peritoneal dialysis catheter. Abdomen and pelvis CT dictated separately. [ES]   1930 CT Chest Without Contrast Diagnostic  IMPRESSION:     1. Interval resolution of the  right-sided pleural effusion and development of faint ground glass less than 6 mm nodular opacities and associated groundglass attenuation in the lungs bilaterally, right greater than left, likely inflammatory or infectious.  Follow-up to clearing recommended.  2. Old healed granulomatous disease and background emphysematous change. Less than 6 mm noncalcified nodule in the left upper lobe unchanged over the comparison interval. Documentation of 2 years stability recommended.  3. Cirrhosis and ascites with trace free air, likely related to a peritoneal dialysis catheter. Abdomen and pelvis CT dictated separately. [ES]   Sun Mar 05, 2023   0137 EKG performed earlier this evening at 2038 showed sinus rhythm, rate 86.  MS interval 142, QRS duration 96, QTc 490 ms.  Some baseline artifact.  Nonspecific ST abnormality.  Evidence of previous septal infarct, not felt to be acute.  No evidence for STEMI.  Overall does not appear significantly changed from 11/20/2022. [CM]   0251 Patient resting comfortably.  She had a dinner tray at approximately 11 PM, tolerated this without difficulty.  She still has had no diarrhea, has had no nausea or vomiting, has been unable to give us a stool specimen for the ordered studies.  Patient is having some social issues at home.  She lives with her fiancé of 35 years.  She states that he is morro at times, becomes frustrated with her, will not give her her medications the way she is supposed to have them at times.  She states that he is not doing anything to harm her.  His name is Cliff, I spoke with him by phone.  He is growing frustrated with having to clean up stool all the time, and states that the patient demands that he give her more medication than she is supposed to take.  He states that she wants to take enough medication to knock herself out every night, that he is only giving her the medication as prescribed and will not give her extra, and this has been very frustrating for him.   "When I assumed the patient's care from Dr. Darnell this evening at shift change, he advises me that Cliff had stated that he is \"done\" and taking care of the patient at home and that she is not welcome to come back.  However, he tells me that that is not the case, she is welcome to come home.  He states they do not have a vehicle, he cannot come get her.  I have paged Dr. Tatum to discuss the case.  Patient admits that she has had a cough lately, but she has not found it bothersome, states that it is due to her allergies, she has not been having any dyspnea.  Patient also states that she has been taking her Synthroid as prescribed. [CM]   0325 Case discussed with Dr. Tatum.  He is willing to admit the patient to the hospital, but states that she cannot go to MedSurg.  We will have to await telemetry bed availability later on in the morning, as regular MedSurg beds are all that we have available right now. [CM]   1013 CT Abdomen Pelvis Without Contrast  IMPRESSION:     1. Trace free air likely related to peritoneal dialysis catheter. Moderate amount of dialysate in the abdomen and pelvis.  2. Cirrhosis.  3. Cholelithiasis.  4. Nonobstructing renal stones and renal vascular calcifications.  5. Normal appendix.  6. Wall thickening of the ascending colon likely related to underlying hepatic dysfunction rather than colitis. [ES]   1013 CT Chest Without Contrast Diagnostic  IMPRESSION:     1. Interval resolution of the right-sided pleural effusion and development of faint ground glass less than 6 mm nodular opacities and associated groundglass attenuation in the lungs bilaterally, right greater than left, likely inflammatory or infectious.  Follow-up to clearing recommended.  2. Old healed granulomatous disease and background emphysematous change. Less than 6 mm noncalcified nodule in the left upper lobe unchanged over the comparison interval. Documentation of 2 years stability recommended.  3. Cirrhosis and ascites " with trace free air, likely related to a peritoneal dialysis catheter. Abdomen and pelvis CT dictated separately. [ES]      ED Course User Index  [CM] Yoan Santos MD  [ES] Clay Maradiaga MD                                           [facility]    Final diagnoses:   None       ED Disposition  ED Disposition     ED Disposition   Decision to Admit    Condition   --    Comment   --             No follow-up provider specified.       Medication List      No changes were made to your prescriptions during this visit.          Clay Maradiaga MD  03/07/23 0200

## 2023-03-05 VITALS
WEIGHT: 93 LBS | HEIGHT: 55 IN | BODY MASS INDEX: 21.52 KG/M2 | TEMPERATURE: 99.4 F | RESPIRATION RATE: 18 BRPM | HEART RATE: 87 BPM | OXYGEN SATURATION: 99 % | SYSTOLIC BLOOD PRESSURE: 165 MMHG | DIASTOLIC BLOOD PRESSURE: 93 MMHG

## 2023-03-05 LAB
AMPHET+METHAMPHET UR QL: NEGATIVE
AMPHETAMINES UR QL: NEGATIVE
BACTERIA UR QL AUTO: NORMAL /HPF
BARBITURATES UR QL SCN: NEGATIVE
BENZODIAZ UR QL SCN: NEGATIVE
BILIRUB UR QL STRIP: NEGATIVE
BUPRENORPHINE SERPL-MCNC: NEGATIVE NG/ML
CANNABINOIDS SERPL QL: NEGATIVE
CLARITY UR: CLEAR
COCAINE UR QL: NEGATIVE
COLOR UR: YELLOW
GLUCOSE UR STRIP-MCNC: ABNORMAL MG/DL
HGB UR QL STRIP.AUTO: NEGATIVE
HYALINE CASTS UR QL AUTO: NORMAL /LPF
KETONES UR QL STRIP: NEGATIVE
LEUKOCYTE ESTERASE UR QL STRIP.AUTO: NEGATIVE
METHADONE UR QL SCN: NEGATIVE
NITRITE UR QL STRIP: NEGATIVE
OPIATES UR QL: POSITIVE
OXYCODONE UR QL SCN: NEGATIVE
PCP UR QL SCN: NEGATIVE
PH UR STRIP.AUTO: >=9 [PH] (ref 5–8)
PROPOXYPH UR QL: NEGATIVE
PROT UR QL STRIP: ABNORMAL
QT INTERVAL: 410 MS
QTC INTERVAL: 490 MS
RBC # UR STRIP: NORMAL /HPF
REF LAB TEST METHOD: NORMAL
SP GR UR STRIP: 1.01 (ref 1–1.03)
SQUAMOUS #/AREA URNS HPF: NORMAL /HPF
TRICYCLICS UR QL SCN: NEGATIVE
UROBILINOGEN UR QL STRIP: ABNORMAL
WBC # UR STRIP: NORMAL /HPF

## 2023-03-05 PROCEDURE — 81001 URINALYSIS AUTO W/SCOPE: CPT | Performed by: EMERGENCY MEDICINE

## 2023-03-05 PROCEDURE — 80306 DRUG TEST PRSMV INSTRMNT: CPT | Performed by: EMERGENCY MEDICINE

## 2023-03-05 PROCEDURE — 25010000002 HYDRALAZINE PER 20 MG: Performed by: EMERGENCY MEDICINE

## 2023-03-05 PROCEDURE — 96376 TX/PRO/DX INJ SAME DRUG ADON: CPT

## 2023-03-05 RX ORDER — DOXYCYCLINE 100 MG/1
100 CAPSULE ORAL 2 TIMES DAILY
Qty: 20 CAPSULE | Refills: 0 | Status: SHIPPED | OUTPATIENT
Start: 2023-03-05 | End: 2023-03-15

## 2023-03-05 RX ORDER — HYDRALAZINE HYDROCHLORIDE 20 MG/ML
20 INJECTION INTRAMUSCULAR; INTRAVENOUS ONCE
Status: COMPLETED | OUTPATIENT
Start: 2023-03-05 | End: 2023-03-05

## 2023-03-05 RX ADMIN — HYDRALAZINE HYDROCHLORIDE 20 MG: 20 INJECTION INTRAMUSCULAR; INTRAVENOUS at 10:29

## 2023-03-05 NOTE — ED NOTES
Patient on bedside commode providing urine sample. Patient says she cannot provide stool sample at this time.

## 2023-03-09 LAB
BACTERIA SPEC AEROBE CULT: NORMAL
BACTERIA SPEC AEROBE CULT: NORMAL

## 2023-03-20 ENCOUNTER — PATIENT OUTREACH (OUTPATIENT)
Dept: CASE MANAGEMENT | Facility: OTHER | Age: 63
End: 2023-03-20
Payer: MEDICARE

## 2023-03-20 NOTE — OUTREACH NOTE
AMBULATORY CASE MANAGEMENT NOTE    Name and Relationship of Patient/Support Person: AbbieCliff (healthcare surrogate) - Emergency Contact     Patient Outreach    Pt cg reports pt feeling better, cg has been in contact with primary care provider and pt has an apt for end of this month. Education provided, understanding voiced. Pt/cg deny questions/concerns at present, v/u of dc instructions, 24/7 nurse line number.     Adult Patient Profile  Questions/Answers    Flowsheet Row Most Recent Value   Symptoms/Conditions Managed at Home cardiovascular   Barriers to Managing Health none   Cardiovascular Symptoms/Conditions hypertension   Cardiovascular Management Strategies medication therapy, routine screening, coping strategies   Taking Medications Not Prescribed no   Missed Doses of Prescribed Medications During Past Week no   Taken Prescribed Medications at Different Time or Schedule During Past Week no   Taken More or Less Medication Than Prescribed no   Barriers to Taking Medication as Prescribed none          Education Documentation  Unresolved/Worsening Symptoms, taught by Adelina Wynne, RN at 3/20/2023 12:56 PM.  Learner: Caregiver, Family  Readiness: Acceptance  Method: Explanation  Response: Verbalizes Understanding    Self-Care, taught by Adelina Wynne, RN at 3/20/2023 12:56 PM.  Learner: Caregiver, Family  Readiness: Acceptance  Method: Explanation  Response: Verbalizes Understanding    Provider Follow-Up, taught by Adelina Wynne, RN at 3/20/2023 12:56 PM.  Learner: Caregiver, Family  Readiness: Acceptance  Method: Explanation  Response: Verbalizes Understanding    Blood Pressure Monitoring, taught by Adelina Wynne, RN at 3/20/2023 12:56 PM.  Learner: Caregiver, Family  Readiness: Acceptance  Method: Explanation  Response: Verbalizes Understanding    Risk Factors, taught by Adelina Wynne, RN at 3/20/2023 12:56 PM.  Learner: Caregiver, Family  Readiness: Acceptance  Method: Explanation  Response: Verbalizes  [facility]  Ambulatory Case Management    3/20/2023, 13:22 EDT

## 2023-08-19 ENCOUNTER — HOSPITAL ENCOUNTER (INPATIENT)
Facility: HOSPITAL | Age: 63
LOS: 4 days | DRG: 291 | End: 2023-08-23
Attending: EMERGENCY MEDICINE
Payer: MEDICARE

## 2023-08-19 ENCOUNTER — APPOINTMENT (OUTPATIENT)
Dept: GENERAL RADIOLOGY | Facility: HOSPITAL | Age: 63
DRG: 291 | End: 2023-08-19
Payer: MEDICARE

## 2023-08-19 ENCOUNTER — APPOINTMENT (OUTPATIENT)
Dept: CARDIOLOGY | Facility: HOSPITAL | Age: 63
DRG: 291 | End: 2023-08-19
Payer: MEDICARE

## 2023-08-19 DIAGNOSIS — Z78.9 SELF-CARE DEFICIT: ICD-10-CM

## 2023-08-19 DIAGNOSIS — N18.9 CHRONIC RENAL FAILURE, UNSPECIFIED CKD STAGE: Primary | ICD-10-CM

## 2023-08-19 LAB
ALBUMIN SERPL-MCNC: 3.3 G/DL (ref 3.5–5.2)
ALBUMIN/GLOB SERPL: 1.2 G/DL
ALP SERPL-CCNC: 82 U/L (ref 39–117)
ALT SERPL W P-5'-P-CCNC: 13 U/L (ref 1–33)
ANION GAP SERPL CALCULATED.3IONS-SCNC: 13.8 MMOL/L (ref 5–15)
APTT PPP: 22.8 SECONDS (ref 26.5–34.5)
AST SERPL-CCNC: 16 U/L (ref 1–32)
BACTERIA UR QL AUTO: ABNORMAL /HPF
BASOPHILS # BLD AUTO: 0.03 10*3/MM3 (ref 0–0.2)
BASOPHILS NFR BLD AUTO: 0.5 % (ref 0–1.5)
BH CV ECHO MEAS - ACS: 1.4 CM
BH CV ECHO MEAS - AO MAX PG: 11.4 MMHG
BH CV ECHO MEAS - AO MEAN PG: 6 MMHG
BH CV ECHO MEAS - AO ROOT DIAM: 3.1 CM
BH CV ECHO MEAS - AO V2 MAX: 169 CM/SEC
BH CV ECHO MEAS - AO V2 VTI: 36.9 CM
BH CV ECHO MEAS - AVA(I,D): 1.87 CM2
BH CV ECHO MEAS - EDV(CUBED): 89.9 ML
BH CV ECHO MEAS - EDV(MOD-SP4): 106 ML
BH CV ECHO MEAS - EF(MOD-SP4): 67.2 %
BH CV ECHO MEAS - ESV(CUBED): 36.6 ML
BH CV ECHO MEAS - ESV(MOD-SP4): 34.8 ML
BH CV ECHO MEAS - FS: 25.9 %
BH CV ECHO MEAS - IVS/LVPW: 1.13 CM
BH CV ECHO MEAS - IVSD: 0.98 CM
BH CV ECHO MEAS - LA DIMENSION: 3.3 CM
BH CV ECHO MEAS - LAT PEAK E' VEL: 7.5 CM/SEC
BH CV ECHO MEAS - LV DIASTOLIC VOL/BSA (35-75): 67.5 CM2
BH CV ECHO MEAS - LV MASS(C)D: 136.1 GRAMS
BH CV ECHO MEAS - LV MAX PG: 5.1 MMHG
BH CV ECHO MEAS - LV MEAN PG: 3 MMHG
BH CV ECHO MEAS - LV SYSTOLIC VOL/BSA (12-30): 22.2 CM2
BH CV ECHO MEAS - LV V1 MAX: 113 CM/SEC
BH CV ECHO MEAS - LV V1 VTI: 27.1 CM
BH CV ECHO MEAS - LVIDD: 4.5 CM
BH CV ECHO MEAS - LVIDS: 3.3 CM
BH CV ECHO MEAS - LVOT AREA: 2.5 CM2
BH CV ECHO MEAS - LVOT DIAM: 1.8 CM
BH CV ECHO MEAS - LVPWD: 0.86 CM
BH CV ECHO MEAS - MED PEAK E' VEL: 7.5 CM/SEC
BH CV ECHO MEAS - MV A MAX VEL: 116 CM/SEC
BH CV ECHO MEAS - MV E MAX VEL: 75.3 CM/SEC
BH CV ECHO MEAS - MV E/A: 0.65
BH CV ECHO MEAS - PA ACC TIME: 0.11 SEC
BH CV ECHO MEAS - RAP SYSTOLE: 10 MMHG
BH CV ECHO MEAS - RVSP: 39.4 MMHG
BH CV ECHO MEAS - SI(MOD-SP4): 45.4 ML/M2
BH CV ECHO MEAS - SV(LVOT): 69 ML
BH CV ECHO MEAS - SV(MOD-SP4): 71.2 ML
BH CV ECHO MEAS - TAPSE (>1.6): 2.9 CM
BH CV ECHO MEAS - TR MAX PG: 29.4 MMHG
BH CV ECHO MEAS - TR MAX VEL: 271 CM/SEC
BH CV ECHO MEASUREMENTS AVERAGE E/E' RATIO: 10.04
BILIRUB SERPL-MCNC: 0.3 MG/DL (ref 0–1.2)
BILIRUB UR QL STRIP: NEGATIVE
BUN SERPL-MCNC: 55 MG/DL (ref 8–23)
BUN/CREAT SERPL: 10.6 (ref 7–25)
CALCIUM SPEC-SCNC: 7.4 MG/DL (ref 8.6–10.5)
CHLORIDE SERPL-SCNC: 102 MMOL/L (ref 98–107)
CLARITY UR: CLEAR
CO2 SERPL-SCNC: 22.2 MMOL/L (ref 22–29)
COLOR UR: YELLOW
CREAT SERPL-MCNC: 5.19 MG/DL (ref 0.57–1)
DEPRECATED RDW RBC AUTO: 46.3 FL (ref 37–54)
EGFRCR SERPLBLD CKD-EPI 2021: 8.7 ML/MIN/1.73
EOSINOPHIL # BLD AUTO: 0.25 10*3/MM3 (ref 0–0.4)
EOSINOPHIL NFR BLD AUTO: 4.2 % (ref 0.3–6.2)
ERYTHROCYTE [DISTWIDTH] IN BLOOD BY AUTOMATED COUNT: 14 % (ref 12.3–15.4)
FIBRINOGEN PPP-MCNC: 375 MG/DL (ref 173–524)
FLUAV RNA RESP QL NAA+PROBE: NOT DETECTED
FLUBV RNA RESP QL NAA+PROBE: NOT DETECTED
GEN 5 2HR TROPONIN T REFLEX: 150 NG/L
GLOBULIN UR ELPH-MCNC: 2.7 GM/DL
GLUCOSE BLDC GLUCOMTR-MCNC: 298 MG/DL (ref 70–130)
GLUCOSE SERPL-MCNC: 264 MG/DL (ref 65–99)
GLUCOSE UR STRIP-MCNC: ABNORMAL MG/DL
HAV IGM SERPL QL IA: NORMAL
HBV CORE IGM SERPL QL IA: NORMAL
HBV SURFACE AG SERPL QL IA: NORMAL
HCT VFR BLD AUTO: 31.3 % (ref 34–46.6)
HCV AB SER DONR QL: NORMAL
HGB BLD-MCNC: 10.1 G/DL (ref 12–15.9)
HGB UR QL STRIP.AUTO: NEGATIVE
HYALINE CASTS UR QL AUTO: ABNORMAL /LPF
IMM GRANULOCYTES # BLD AUTO: 0.03 10*3/MM3 (ref 0–0.05)
IMM GRANULOCYTES NFR BLD AUTO: 0.5 % (ref 0–0.5)
INR PPP: 0.85 (ref 0.9–1.1)
KETONES UR QL STRIP: NEGATIVE
LEFT ATRIUM VOLUME INDEX: 33.6 ML/M2
LEUKOCYTE ESTERASE UR QL STRIP.AUTO: NEGATIVE
LYMPHOCYTES # BLD AUTO: 0.83 10*3/MM3 (ref 0.7–3.1)
LYMPHOCYTES NFR BLD AUTO: 13.9 % (ref 19.6–45.3)
MAGNESIUM SERPL-MCNC: 2.2 MG/DL (ref 1.6–2.4)
MCH RBC QN AUTO: 29.9 PG (ref 26.6–33)
MCHC RBC AUTO-ENTMCNC: 32.3 G/DL (ref 31.5–35.7)
MCV RBC AUTO: 92.6 FL (ref 79–97)
MONOCYTES # BLD AUTO: 0.3 10*3/MM3 (ref 0.1–0.9)
MONOCYTES NFR BLD AUTO: 5 % (ref 5–12)
NEUTROPHILS NFR BLD AUTO: 4.51 10*3/MM3 (ref 1.7–7)
NEUTROPHILS NFR BLD AUTO: 75.9 % (ref 42.7–76)
NITRITE UR QL STRIP: NEGATIVE
NRBC BLD AUTO-RTO: 0 /100 WBC (ref 0–0.2)
PH UR STRIP.AUTO: 6 [PH] (ref 5–8)
PHOSPHATE SERPL-MCNC: 6.4 MG/DL (ref 2.5–4.5)
PLATELET # BLD AUTO: 106 10*3/MM3 (ref 140–450)
PMV BLD AUTO: 9.7 FL (ref 6–12)
POTASSIUM SERPL-SCNC: 4 MMOL/L (ref 3.5–5.2)
PROT SERPL-MCNC: 6 G/DL (ref 6–8.5)
PROT UR QL STRIP: ABNORMAL
PROTHROMBIN TIME: 12.1 SECONDS (ref 12.1–14.7)
RBC # BLD AUTO: 3.38 10*6/MM3 (ref 3.77–5.28)
RBC # UR STRIP: ABNORMAL /HPF
REF LAB TEST METHOD: ABNORMAL
SARS-COV-2 RNA RESP QL NAA+PROBE: NOT DETECTED
SODIUM SERPL-SCNC: 138 MMOL/L (ref 136–145)
SP GR UR STRIP: 1.01 (ref 1–1.03)
SQUAMOUS #/AREA URNS HPF: ABNORMAL /HPF
T4 FREE SERPL-MCNC: 0.39 NG/DL (ref 0.93–1.7)
TROPONIN T DELTA: -8 NG/L
TROPONIN T SERPL HS-MCNC: 158 NG/L
TSH SERPL DL<=0.05 MIU/L-ACNC: 155.9 UIU/ML (ref 0.27–4.2)
UROBILINOGEN UR QL STRIP: ABNORMAL
WBC # UR STRIP: ABNORMAL /HPF
WBC NRBC COR # BLD: 5.95 10*3/MM3 (ref 3.4–10.8)

## 2023-08-19 PROCEDURE — 25010000002 HYDRALAZINE PER 20 MG: Performed by: NURSE PRACTITIONER

## 2023-08-19 PROCEDURE — 99223 1ST HOSP IP/OBS HIGH 75: CPT

## 2023-08-19 PROCEDURE — 84443 ASSAY THYROID STIM HORMONE: CPT | Performed by: HOSPITALIST

## 2023-08-19 PROCEDURE — 84100 ASSAY OF PHOSPHORUS: CPT | Performed by: NURSE PRACTITIONER

## 2023-08-19 PROCEDURE — 82948 REAGENT STRIP/BLOOD GLUCOSE: CPT

## 2023-08-19 PROCEDURE — 3E1M39Z IRRIGATION OF PERITONEAL CAVITY USING DIALYSATE, PERCUTANEOUS APPROACH: ICD-10-PCS | Performed by: INTERNAL MEDICINE

## 2023-08-19 PROCEDURE — 25010000002 HEPARIN (PORCINE) PER 1000 UNITS: Performed by: HOSPITALIST

## 2023-08-19 PROCEDURE — 99285 EMERGENCY DEPT VISIT HI MDM: CPT

## 2023-08-19 PROCEDURE — 85384 FIBRINOGEN ACTIVITY: CPT | Performed by: HOSPITALIST

## 2023-08-19 PROCEDURE — 81001 URINALYSIS AUTO W/SCOPE: CPT | Performed by: NURSE PRACTITIONER

## 2023-08-19 PROCEDURE — 93005 ELECTROCARDIOGRAM TRACING: CPT | Performed by: EMERGENCY MEDICINE

## 2023-08-19 PROCEDURE — 36415 COLL VENOUS BLD VENIPUNCTURE: CPT

## 2023-08-19 PROCEDURE — 84484 ASSAY OF TROPONIN QUANT: CPT | Performed by: HOSPITALIST

## 2023-08-19 PROCEDURE — 71045 X-RAY EXAM CHEST 1 VIEW: CPT | Performed by: RADIOLOGY

## 2023-08-19 PROCEDURE — 93010 ELECTROCARDIOGRAM REPORT: CPT | Performed by: INTERNAL MEDICINE

## 2023-08-19 PROCEDURE — 85730 THROMBOPLASTIN TIME PARTIAL: CPT | Performed by: NURSE PRACTITIONER

## 2023-08-19 PROCEDURE — 84439 ASSAY OF FREE THYROXINE: CPT | Performed by: HOSPITALIST

## 2023-08-19 PROCEDURE — 80053 COMPREHEN METABOLIC PANEL: CPT | Performed by: NURSE PRACTITIONER

## 2023-08-19 PROCEDURE — 63710000001 INSULIN LISPRO (HUMAN) PER 5 UNITS: Performed by: HOSPITALIST

## 2023-08-19 PROCEDURE — 63710000001 INSULIN GLARGINE PER 5 UNITS: Performed by: HOSPITALIST

## 2023-08-19 PROCEDURE — 87636 SARSCOV2 & INF A&B AMP PRB: CPT | Performed by: EMERGENCY MEDICINE

## 2023-08-19 PROCEDURE — 93306 TTE W/DOPPLER COMPLETE: CPT

## 2023-08-19 PROCEDURE — 85610 PROTHROMBIN TIME: CPT | Performed by: NURSE PRACTITIONER

## 2023-08-19 PROCEDURE — 85025 COMPLETE CBC W/AUTO DIFF WBC: CPT | Performed by: NURSE PRACTITIONER

## 2023-08-19 PROCEDURE — 71045 X-RAY EXAM CHEST 1 VIEW: CPT

## 2023-08-19 PROCEDURE — 83735 ASSAY OF MAGNESIUM: CPT | Performed by: NURSE PRACTITIONER

## 2023-08-19 PROCEDURE — 93306 TTE W/DOPPLER COMPLETE: CPT | Performed by: INTERNAL MEDICINE

## 2023-08-19 PROCEDURE — 82607 VITAMIN B-12: CPT | Performed by: HOSPITALIST

## 2023-08-19 PROCEDURE — 80074 ACUTE HEPATITIS PANEL: CPT | Performed by: INTERNAL MEDICINE

## 2023-08-19 RX ORDER — METOCLOPRAMIDE 5 MG/1
2.5 TABLET ORAL
Status: CANCELLED | OUTPATIENT
Start: 2023-08-20

## 2023-08-19 RX ORDER — TIZANIDINE 4 MG/1
4 TABLET ORAL DAILY PRN
Status: DISCONTINUED | OUTPATIENT
Start: 2023-08-19 | End: 2023-08-23 | Stop reason: HOSPADM

## 2023-08-19 RX ORDER — ATORVASTATIN CALCIUM 40 MG/1
80 TABLET, FILM COATED ORAL NIGHTLY
Status: DISCONTINUED | OUTPATIENT
Start: 2023-08-19 | End: 2023-08-23 | Stop reason: HOSPADM

## 2023-08-19 RX ORDER — ASPIRIN 81 MG/1
81 TABLET ORAL DAILY
Status: DISCONTINUED | OUTPATIENT
Start: 2023-08-19 | End: 2023-08-23 | Stop reason: HOSPADM

## 2023-08-19 RX ORDER — INSULIN LISPRO 100 [IU]/ML
3 INJECTION, SOLUTION INTRAVENOUS; SUBCUTANEOUS
Status: CANCELLED | OUTPATIENT
Start: 2023-08-19

## 2023-08-19 RX ORDER — GABAPENTIN 300 MG/1
300 CAPSULE ORAL DAILY
Status: DISCONTINUED | OUTPATIENT
Start: 2023-08-20 | End: 2023-08-23 | Stop reason: HOSPADM

## 2023-08-19 RX ORDER — LOPERAMIDE HYDROCHLORIDE 2 MG/1
2 CAPSULE ORAL 3 TIMES DAILY PRN
Status: CANCELLED | OUTPATIENT
Start: 2023-08-19

## 2023-08-19 RX ORDER — FLUOXETINE HYDROCHLORIDE 20 MG/1
40 CAPSULE ORAL EVERY MORNING
Status: DISCONTINUED | OUTPATIENT
Start: 2023-08-20 | End: 2023-08-23 | Stop reason: HOSPADM

## 2023-08-19 RX ORDER — HYDRALAZINE HYDROCHLORIDE 20 MG/ML
20 INJECTION INTRAMUSCULAR; INTRAVENOUS ONCE
Status: DISCONTINUED | OUTPATIENT
Start: 2023-08-19 | End: 2023-08-19

## 2023-08-19 RX ORDER — HYDROXYZINE HYDROCHLORIDE 25 MG/1
12.5 TABLET, FILM COATED ORAL 3 TIMES DAILY PRN
Status: DISCONTINUED | OUTPATIENT
Start: 2023-08-19 | End: 2023-08-23 | Stop reason: HOSPADM

## 2023-08-19 RX ORDER — GLUCAGON 1 MG/ML
1 KIT INJECTION
Status: DISCONTINUED | OUTPATIENT
Start: 2023-08-19 | End: 2023-08-23 | Stop reason: HOSPADM

## 2023-08-19 RX ORDER — BISACODYL 10 MG
10 SUPPOSITORY, RECTAL RECTAL DAILY PRN
Status: DISCONTINUED | OUTPATIENT
Start: 2023-08-19 | End: 2023-08-23 | Stop reason: HOSPADM

## 2023-08-19 RX ORDER — LOPERAMIDE HYDROCHLORIDE 2 MG/1
2 CAPSULE ORAL 4 TIMES DAILY PRN
Status: DISCONTINUED | OUTPATIENT
Start: 2023-08-19 | End: 2023-08-23 | Stop reason: HOSPADM

## 2023-08-19 RX ORDER — METOPROLOL SUCCINATE 25 MG/1
25 TABLET, EXTENDED RELEASE ORAL DAILY
Status: DISCONTINUED | OUTPATIENT
Start: 2023-08-20 | End: 2023-08-23 | Stop reason: HOSPADM

## 2023-08-19 RX ORDER — ROPINIROLE 0.25 MG/1
0.5 TABLET, FILM COATED ORAL 2 TIMES DAILY
Status: DISCONTINUED | OUTPATIENT
Start: 2023-08-19 | End: 2023-08-23 | Stop reason: HOSPADM

## 2023-08-19 RX ORDER — DEXTROSE MONOHYDRATE 25 G/50ML
25 INJECTION, SOLUTION INTRAVENOUS
Status: DISCONTINUED | OUTPATIENT
Start: 2023-08-19 | End: 2023-08-23 | Stop reason: HOSPADM

## 2023-08-19 RX ORDER — LEVOTHYROXINE SODIUM 0.03 MG/1
25 TABLET ORAL DAILY
Status: DISCONTINUED | OUTPATIENT
Start: 2023-08-19 | End: 2023-08-23 | Stop reason: HOSPADM

## 2023-08-19 RX ORDER — NITROGLYCERIN 0.4 MG/1
0.4 TABLET SUBLINGUAL
Status: DISCONTINUED | OUTPATIENT
Start: 2023-08-19 | End: 2023-08-23 | Stop reason: HOSPADM

## 2023-08-19 RX ORDER — CLONIDINE HYDROCHLORIDE 0.1 MG/1
0.1 TABLET ORAL 3 TIMES DAILY PRN
Status: DISCONTINUED | OUTPATIENT
Start: 2023-08-19 | End: 2023-08-23 | Stop reason: HOSPADM

## 2023-08-19 RX ORDER — HYDRALAZINE HYDROCHLORIDE 20 MG/ML
10 INJECTION INTRAMUSCULAR; INTRAVENOUS ONCE
Status: COMPLETED | OUTPATIENT
Start: 2023-08-19 | End: 2023-08-19

## 2023-08-19 RX ORDER — HYDROCODONE BITARTRATE AND ACETAMINOPHEN 10; 325 MG/1; MG/1
1 TABLET ORAL 3 TIMES DAILY PRN
Status: DISCONTINUED | OUTPATIENT
Start: 2023-08-19 | End: 2023-08-23 | Stop reason: HOSPADM

## 2023-08-19 RX ORDER — METOPROLOL SUCCINATE 25 MG/1
25 TABLET, EXTENDED RELEASE ORAL DAILY
COMMUNITY

## 2023-08-19 RX ORDER — PANTOPRAZOLE SODIUM 40 MG/1
40 TABLET, DELAYED RELEASE ORAL DAILY
Status: DISCONTINUED | OUTPATIENT
Start: 2023-08-19 | End: 2023-08-23 | Stop reason: HOSPADM

## 2023-08-19 RX ORDER — TRAZODONE HYDROCHLORIDE 50 MG/1
50 TABLET ORAL NIGHTLY
Status: DISCONTINUED | OUTPATIENT
Start: 2023-08-19 | End: 2023-08-23 | Stop reason: HOSPADM

## 2023-08-19 RX ORDER — NICOTINE POLACRILEX 4 MG
15 LOZENGE BUCCAL
Status: DISCONTINUED | OUTPATIENT
Start: 2023-08-19 | End: 2023-08-23 | Stop reason: HOSPADM

## 2023-08-19 RX ORDER — ONDANSETRON 4 MG/1
4 TABLET, ORALLY DISINTEGRATING ORAL DAILY PRN
Status: DISCONTINUED | OUTPATIENT
Start: 2023-08-19 | End: 2023-08-23 | Stop reason: HOSPADM

## 2023-08-19 RX ORDER — DIPHENOXYLATE HYDROCHLORIDE AND ATROPINE SULFATE 2.5; .025 MG/1; MG/1
1 TABLET ORAL DAILY
Status: DISCONTINUED | OUTPATIENT
Start: 2023-08-20 | End: 2023-08-23 | Stop reason: HOSPADM

## 2023-08-19 RX ORDER — SODIUM CHLORIDE 9 MG/ML
40 INJECTION, SOLUTION INTRAVENOUS AS NEEDED
Status: DISCONTINUED | OUTPATIENT
Start: 2023-08-19 | End: 2023-08-23 | Stop reason: HOSPADM

## 2023-08-19 RX ORDER — HYDRALAZINE HYDROCHLORIDE 10 MG/1
10 TABLET, FILM COATED ORAL 3 TIMES DAILY PRN
Status: DISCONTINUED | OUTPATIENT
Start: 2023-08-19 | End: 2023-08-23 | Stop reason: HOSPADM

## 2023-08-19 RX ORDER — AMOXICILLIN 250 MG
2 CAPSULE ORAL 2 TIMES DAILY
Status: DISCONTINUED | OUTPATIENT
Start: 2023-08-19 | End: 2023-08-23 | Stop reason: HOSPADM

## 2023-08-19 RX ORDER — AMLODIPINE BESYLATE 10 MG/1
10 TABLET ORAL DAILY
Status: DISCONTINUED | OUTPATIENT
Start: 2023-08-20 | End: 2023-08-23 | Stop reason: HOSPADM

## 2023-08-19 RX ORDER — AMLODIPINE BESYLATE 10 MG/1
10 TABLET ORAL DAILY
COMMUNITY
End: 2023-09-07 | Stop reason: HOSPADM

## 2023-08-19 RX ORDER — HYDROXYZINE HYDROCHLORIDE 25 MG/1
25 TABLET, FILM COATED ORAL 3 TIMES DAILY PRN
Status: CANCELLED | OUTPATIENT
Start: 2023-08-19

## 2023-08-19 RX ORDER — BISACODYL 5 MG/1
5 TABLET, DELAYED RELEASE ORAL DAILY PRN
Status: DISCONTINUED | OUTPATIENT
Start: 2023-08-19 | End: 2023-08-23 | Stop reason: HOSPADM

## 2023-08-19 RX ORDER — TIZANIDINE 4 MG/1
4 TABLET ORAL EVERY 8 HOURS PRN
Status: CANCELLED | OUTPATIENT
Start: 2023-08-19

## 2023-08-19 RX ORDER — ISOSORBIDE MONONITRATE 30 MG/1
30 TABLET, EXTENDED RELEASE ORAL EVERY MORNING
Status: DISCONTINUED | OUTPATIENT
Start: 2023-08-19 | End: 2023-08-23 | Stop reason: HOSPADM

## 2023-08-19 RX ORDER — HEPARIN SODIUM 5000 [USP'U]/ML
5000 INJECTION, SOLUTION INTRAVENOUS; SUBCUTANEOUS EVERY 12 HOURS SCHEDULED
Status: DISCONTINUED | OUTPATIENT
Start: 2023-08-19 | End: 2023-08-23 | Stop reason: HOSPADM

## 2023-08-19 RX ORDER — SODIUM CHLORIDE 0.9 % (FLUSH) 0.9 %
10 SYRINGE (ML) INJECTION EVERY 12 HOURS SCHEDULED
Status: DISCONTINUED | OUTPATIENT
Start: 2023-08-19 | End: 2023-08-23 | Stop reason: HOSPADM

## 2023-08-19 RX ORDER — INSULIN DETEMIR 100 [IU]/ML
10 INJECTION, SOLUTION SUBCUTANEOUS NIGHTLY
COMMUNITY
End: 2023-09-07 | Stop reason: HOSPADM

## 2023-08-19 RX ORDER — POLYETHYLENE GLYCOL 3350 17 G/17G
17 POWDER, FOR SOLUTION ORAL DAILY PRN
Status: DISCONTINUED | OUTPATIENT
Start: 2023-08-19 | End: 2023-08-23 | Stop reason: HOSPADM

## 2023-08-19 RX ORDER — INSULIN LISPRO 100 [IU]/ML
2-7 INJECTION, SOLUTION INTRAVENOUS; SUBCUTANEOUS
Status: DISCONTINUED | OUTPATIENT
Start: 2023-08-19 | End: 2023-08-23 | Stop reason: HOSPADM

## 2023-08-19 RX ORDER — SODIUM CHLORIDE 0.9 % (FLUSH) 0.9 %
10 SYRINGE (ML) INJECTION AS NEEDED
Status: DISCONTINUED | OUTPATIENT
Start: 2023-08-19 | End: 2023-08-23 | Stop reason: HOSPADM

## 2023-08-19 RX ORDER — LISINOPRIL 10 MG/1
20 TABLET ORAL
Status: DISCONTINUED | OUTPATIENT
Start: 2023-08-19 | End: 2023-08-19

## 2023-08-19 RX ORDER — FLUTICASONE PROPIONATE 50 MCG
2 SPRAY, SUSPENSION (ML) NASAL DAILY PRN
Status: DISCONTINUED | OUTPATIENT
Start: 2023-08-19 | End: 2023-08-23 | Stop reason: HOSPADM

## 2023-08-19 RX ADMIN — INSULIN LISPRO 4 UNITS: 100 INJECTION, SOLUTION INTRAVENOUS; SUBCUTANEOUS at 20:56

## 2023-08-19 RX ADMIN — DOCUSATE SODIUM 50 MG AND SENNOSIDES 8.6 MG 2 TABLET: 8.6; 5 TABLET, FILM COATED ORAL at 20:54

## 2023-08-19 RX ADMIN — ISOSORBIDE MONONITRATE 30 MG: 30 TABLET, EXTENDED RELEASE ORAL at 20:54

## 2023-08-19 RX ADMIN — INSULIN GLARGINE 6 UNITS: 100 INJECTION, SOLUTION SUBCUTANEOUS at 20:56

## 2023-08-19 RX ADMIN — TRAZODONE HYDROCHLORIDE 50 MG: 50 TABLET ORAL at 20:54

## 2023-08-19 RX ADMIN — ATORVASTATIN CALCIUM 80 MG: 40 TABLET, FILM COATED ORAL at 20:54

## 2023-08-19 RX ADMIN — LEVOTHYROXINE SODIUM 25 MCG: 25 TABLET ORAL at 20:54

## 2023-08-19 RX ADMIN — HYDROXYZINE HYDROCHLORIDE 12.5 MG: 25 TABLET, FILM COATED ORAL at 20:54

## 2023-08-19 RX ADMIN — HYDROCODONE BITARTRATE AND ACETAMINOPHEN 1 TABLET: 10; 325 TABLET ORAL at 20:55

## 2023-08-19 RX ADMIN — ROPINIROLE HYDROCHLORIDE 0.5 MG: 0.25 TABLET, FILM COATED ORAL at 20:54

## 2023-08-19 RX ADMIN — HEPARIN SODIUM 5000 UNITS: 5000 INJECTION INTRAVENOUS; SUBCUTANEOUS at 20:54

## 2023-08-19 RX ADMIN — HYDRALAZINE HYDROCHLORIDE 10 MG: 20 INJECTION INTRAMUSCULAR; INTRAVENOUS at 16:52

## 2023-08-19 RX ADMIN — HYDRALAZINE HYDROCHLORIDE 10 MG: 20 INJECTION INTRAMUSCULAR; INTRAVENOUS at 11:12

## 2023-08-19 RX ADMIN — Medication 10 ML: at 20:54

## 2023-08-19 RX ADMIN — PANTOPRAZOLE SODIUM 40 MG: 40 TABLET, DELAYED RELEASE ORAL at 20:54

## 2023-08-19 NOTE — CASE MANAGEMENT/SOCIAL WORK
Discharge Planning Assessment   Jose Alfredo     Patient Name: Reny Elena  MRN: [id number]  Today's Date: 8/19/2023    Admit Date: 8/19/2023    Plan: Pt lives at home with her significant other Cliff Leiva. Pcp is Syl Stephens, she uses [facility] and has [place] Medicaid and Medicare B. Pt uses rollator and wheelchair at home. Pt does not have home o2. Pt does peritoneal dialysis at home her significant other provides her care and he is currently in the hospital in [place]. Pt has not had dialysis in 5 days.   Discharge Needs Assessment       Row Name 08/19/23 1614       Living Environment    People in Home significant other    Current Living Arrangements home    Primary Care Provided by spouse/significant other    Quality of Family Relationships unable to assess    Able to Return to Prior Arrangements other (see comments)       Transition Planning    Patient/Family Anticipates Transition to home with family    Patient/Family Anticipated Services at Transition        Discharge Needs Assessment    Readmission Within the Last 30 Days no previous admission in last 30 days    Equipment Currently Used at Home rollator;wheelchair    Concerns to be Addressed discharge planning    Anticipated Changes Related to Illness inability to care for self                   Discharge Plan       Row Name 08/19/23 4170       Plan    Plan Pt lives at home with her significant other Cliff Leiva. Pcp is Syl Stephens, she uses [facility] and has [place] Medicaid and Medicare B. Pt uses rollator and wheelchair at home. Pt does not have home o2. Pt does peritoneal dialysis at home her significant other provides her care and he is currently in the hospital in [place]. Pt has not had dialysis in 5 days.                  Continued Care and Services - Admitted Since 8/19/2023    Coordination has not been started for this encounter.       Expected Discharge Date and Time       Expected Discharge Date Expected Discharge Time    Aug  21, 2023            Demographic Summary       Row Name 08/19/23 1612       General Information    Admission Type inpatient    Arrived From home    Referral Source emergency department    Reason for Consult discharge planning                          Reny Chung RN

## 2023-08-19 NOTE — ED NOTES
Patient daughter on phone requesting an update. Per pt, ok to update daughter on poc.     Pt daughter phone #  [phone]

## 2023-08-19 NOTE — PLAN OF CARE
Goal Outcome Evaluation:   Patient admitted to unit. VSS at this time. Complaints of anxiety at this time. No other complaints at this time. Plan of care ongoing.

## 2023-08-19 NOTE — H&P
[facility] Medicine Services  History & Physical    Patient Identification:  Name:  Reny Elena  Age:  63 y.o.  Sex:  female  :  1960  MRN:  [id number]   Visit Number:  [id number]  Admit Date: 2023   Primary Care Physician:  Susan Stephens APRN    Subjective     Chief complaint: Hypertension    History of presenting illness:      Reny Elena is a 63 y.o. female who presented for further evaluation of hypertension and missed dialysis sessions. Patient reports her life partner who cares for her at home is currently admitted to the ICU. She states as such she has not had peritoneal dialysis in 5 days. She states over the past 5 days she has felt more progressively ill. She reports increased swelling of her abdomen and feeling generally weaker than baseline. It is of note that patient is nonambulatory at baseline, uses a wheelchair. She states this morning her neighbor who happens to be a dialysis technician came over to check on her and had concerns so called Dr. Sandy who recommended she come on in to the ED. She also notes her BP was elevated this AM, is generally well controlled at home. She has had some head aches. Petechial rash noted on exam to left forearm and patient states thrombocytopenia has been a chronic issue for her and she gets monthly injections to treat this. Unable to confirm on chart review. She reports compliance with home insulin and antihypertensive regimen.  She denies any chest pain or palpitations, no recent fever or chills. No SOB or cough. Some recent diarrhea though notes has some difficulty with this chronically. See full ROS below.     Past medical history is significant for ESRD on PD, HTN, HLD,hypothyroidism chronic HFpEF, iron deficiency anemia, IDDM 2, Hx CVA, PAD, GERD    Upon arrival to the ED, vital signs were temp 98.7, heart rate 67, respirations 18, /95, SPO2 99% on room air.  BP did trend as high as 208/99 in the ED though has  since improved post hydralazine, 151/65 on last check.  Significant laboratory findings include glucose 264, phosphorus elevated at 6.4, albumin is low at 3.3,, calcium when corrected for hypoalbuminemia is low at 7.6.  CBC noted anemia around recent baseline.  Platelets are low at 106.  EKG normal sinus rhythm, rate 66, prolonged QTc interval at 509    Known Emergency Department medications received prior to my evaluation included hydralazine 10 mg x 1.   Emergency Department Room location at the time of my evaluation was 405.     ---------------------------------------------------------------------------------------------------------------------   Review of Systems   Constitutional:  Negative for chills and fever.   HENT:  Negative for congestion and rhinorrhea.    Respiratory:  Negative for cough and shortness of breath.    Cardiovascular:  Negative for chest pain and palpitations.   Gastrointestinal:  Positive for abdominal distention. Negative for diarrhea, nausea and vomiting.   Musculoskeletal:  Negative for arthralgias and myalgias.   Skin:  Positive for rash. Negative for wound.   Neurological:  Negative for dizziness and light-headedness.      ---------------------------------------------------------------------------------------------------------------------   Past Medical History:   Diagnosis Date    Arthritis     Closed fracture of right fibula and tibia 12/25/2018    Depression     Diabetic ulcer of left foot associated with type 2 diabetes mellitus 6/23/2017    Diastolic dysfunction     Disease of thyroid gland     Elevated cholesterol     End stage renal disease     Essential hypertension     GERD (gastroesophageal reflux disease)     History of transfusion     Hyperlipidemia     Iron deficiency anemia 12/30/2018    PAD (peripheral artery disease)     Renal failure     Type 2 diabetes mellitus with hyperglycemia, with long-term current use of insulin 12/30/2018     Past Surgical History:   Procedure  Laterality Date    ABDOMINAL SURGERY      BACK SURGERY      Post spinal diskectomy, osteophytectomy in one lumbar interspace    CATARACT EXTRACTION      Left      SECTION      DENTAL PROCEDURE      ENDOSCOPY      FRACTURE SURGERY      right leg    HYSTERECTOMY      INCISION AND DRAINAGE LEG Left 4/15/2017    Procedure: INCISION AND DRAINAGE LOWER EXTREMITY;  Surgeon: Basilio Morris MD;  Location: [facility] OR;  Service:     INCISION AND DRAINAGE LEG Left 2017    Procedure: Irrigation and Debridment abcess diabetic wound left foot with deep culture;  Surgeon: Basilio Morris MD;  Location: [facility] OR;  Service:     INSERTION PERITONEAL DIALYSIS CATHETER N/A 2021    Procedure: INSERTION PERITONEAL DIALYSIS CATHETER LAPAROSCOPIC;  Surgeon: Edy Glover MD;  Location: [facility] OR;  Service: General;  Laterality: N/A;    KNEE ARTHROSCOPY Right     ORIF TIBIA FRACTURE Right 2018    Procedure: TIBIAL  FRACTURE OPEN REDUCTION INTERNAL FIXATION;  Surgeon: Jung Barragan MD;  Location: [facility] OR;  Service: Orthopedics    TOE AMPUTATION Right     5th    TUBAL ABDOMINAL LIGATION       Family History   Problem Relation Age of Onset    Heart disease Mother     Cancer Mother     COPD Mother     Diabetes Other     Depression Other      Social History     Socioeconomic History    Marital status:    Tobacco Use    Smoking status: Never    Smokeless tobacco: Never   Vaping Use    Vaping Use: Never used   Substance and Sexual Activity    Alcohol use: No    Drug use: No    Sexual activity: Defer     ---------------------------------------------------------------------------------------------------------------------   Allergies:  Penicillins, Codeine, and Sulfa antibiotics  ---------------------------------------------------------------------------------------------------------------------   Home medications:    Medications below are reported home medications pulling from within the system; at this time,  these medications have not been reconciled unless otherwise specified and are in the verification process for further verifcation as current home medications.  (Not in a hospital admission)      Hospital Scheduled Meds:    No current facility-administered medications for this encounter.      Current listed hospital scheduled medications may not yet reflect those currently placed in orders that are signed and held awaiting patient's arrival to floor.   ---------------------------------------------------------------------------------------------------------------------     Objective     Vital Signs:  Temp:  [98.7 °F (37.1 °C)] 98.7 °F (37.1 °C)  Heart Rate:  [67-68] 68  Resp:  [18] 18  BP: (151-208)/(65-99) 151/65      08/19/23  1014   Weight: 52.6 kg (116 lb)     Body mass index is 146.16 kg/m².  ---------------------------------------------------------------------------------------------------------------------       Physical Exam  Vitals and nursing note reviewed.   Constitutional:       General: She is not in acute distress.     Appearance: She is ill-appearing (chronically ill appearing).   HENT:      Head: Normocephalic and atraumatic.   Eyes:      Extraocular Movements: Extraocular movements intact.      Conjunctiva/sclera: Conjunctivae normal.   Cardiovascular:      Rate and Rhythm: Normal rate and regular rhythm.      Heart sounds: Murmur heard.   Pulmonary:      Effort: Pulmonary effort is normal.      Breath sounds: Normal breath sounds.   Abdominal:      Palpations: Abdomen is soft.      Tenderness: There is no abdominal tenderness.      Comments: PD catheter RUQ   Musculoskeletal:      Right lower leg: No edema.      Left lower leg: No edema.   Skin:     General: Skin is warm and dry.      Comments: Petechial rash, primarily left forearm and hand, beneath BP cuff   Neurological:      Mental Status: She is alert. Mental status is at baseline.   Psychiatric:         Mood and Affect: Mood normal.          Behavior: Behavior normal.             ---------------------------------------------------------------------------------------------------------------------  EKG:        I have personally looked at the EKG.  ---------------------------------------------------------------------------------------------------------------------   Results from last 7 days   Lab Units 08/19/23  1109   WBC 10*3/mm3 5.95   HEMOGLOBIN g/dL 10.1*   HEMATOCRIT % 31.3*   MCV fL 92.6   MCHC g/dL 32.3   PLATELETS 10*3/mm3 106*   INR  0.85*         Results from last 7 days   Lab Units 08/19/23  1109   SODIUM mmol/L 138   POTASSIUM mmol/L 4.0   MAGNESIUM mg/dL 2.2   CHLORIDE mmol/L 102   CO2 mmol/L 22.2   BUN mg/dL 55*   CREATININE mg/dL 5.19*   CALCIUM mg/dL 7.4*   GLUCOSE mg/dL 264*   ALBUMIN g/dL 3.3*   BILIRUBIN mg/dL 0.3   ALK PHOS U/L 82   AST (SGOT) U/L 16   ALT (SGPT) U/L 13   Estimated Creatinine Clearance: 4.4 mL/min (A) (by C-G formula based on SCr of 5.19 mg/dL (H)).  No results found for: AMMONIA          Lab Results   Component Value Date    HGBA1C 8.30 (H) 07/06/2023     Lab Results   Component Value Date    TSH 62.880 (H) 03/04/2023    FREET4 1.15 03/04/2023     No results found for: PREGTESTUR, PREGSERUM, HCG, HCGQUANT  Pain Management Panel  More data exists         Latest Ref Rng & Units 3/5/2023 5/11/2022   Pain Management Panel   Amphetamine, Urine Qual Negative Negative  Negative    Barbiturates Screen, Urine Negative Negative  Negative    Benzodiazepine Screen, Urine Negative Negative  Positive    Buprenorphine, Screen, Urine Negative Negative  Negative    Cocaine Screen, Urine Negative Negative  Negative    Methadone Screen , Urine Negative Negative  Negative    Methamphetamine, Ur Negative Negative  Negative      No results found for: BLOODCX  No results found for: URINECX  No results found for: WOUNDCX  No results found for: STOOLCX       ---------------------------------------------------------------------------------------------------------------------  Imaging Results (Last 7 Days)       Procedure Component Value Units Date/Time    XR Chest AP [[id number]] Resulted: 08/19/23 1327     Updated: 08/19/23 1327            Cultures:  No results found for: BLOODCX, URINECX, WOUNDCX, MRSACX, RESPCX, STOOLCX    Last echocardiogram:  Results for orders placed during the hospital encounter of 07/01/22    Adult Transthoracic Echo Complete W/ Cont if Necessary Per Protocol (With Agitated Saline)    Interpretation Summary  · Left ventricular ejection fraction appears to be 51 - 55%. Left ventricular systolic function is normal.  · Left ventricular diastolic function is consistent with (grade I) impaired relaxation.  · The aortic valve is abnormal in structure. The aortic valve exhibits sclerosis. There is mild calcification of the aortic valve. The aortic valve was poorly visualized but appears trileaflet. Trace aortic valve regurgitation is present. Mild aortic valve stenosis is present.  · Saline test results are negative.          I have personally reviewed the above radiology images and read the final radiology report on 08/19/23  ---------------------------------------------------------------------------------------------------------------------  Assessment / Plan     Active Hospital Problems    Diagnosis  POA    **Renal failure [N19]  Yes       ASSESSMENT/PLAN:    ESRD on PD with multiple missed sessions  Uncontrolled hypertension, POA  Elevated HS troponin, POA, likely predominantly due to #1  IDDM 2 presenting with hyperglycemia  Hypocalcemia  Hyperphosphatemia  Hypoalbuminemia  Thrombocytopenia, appearing to be chronic, intermittent   Patient presents to the ED reporting home BP has been elevated.  Also has ESRD and is on PD though has missed dialysis for the past 5 days as her caretaker is currently admitted to this ICU.  Work-up in the ED  significant for calcium 7.6 when corrected for hypoalbuminemia which is at 3.3, also with hyperphosphatemia at 6.4.  Glucose is 264.  Platelets are decreased at 106.  Patient's BP as high as 208/99 though did improve to 151/65 at last check s/p hydralazine 10 mg x 1 in the ED.  She does report headaches at home.  She will be admitted to telemetry for further management  We will consult nephrology for assistance in the management of ESRD while inpatient.  Assistance is appreciated.  Resume home lisinopril, Imdur.  Continue to monitor BP trend.  May restart home hydralazine as well if indicated, patient has had dose this AM as above.  CXR and UA ordered and pending, will follow up  We will also obtain an updated TTE. Does not appear volume overloaded on exam.  Monitor I's and O's  We will continue with home Lantus 6 units nightly.  Accu-Cheks to monitor with hypoglycemia protocol in place.  We will continue to adjust insulin regimen as needed while inpatient.  Continue to provide supportive care  Monitor platelet count closely. Treat as indicated.       Chronic:  HLD  Hypothyroidism  Chronic HFpEF  Iron deficiency anemia  Hx CVA  PAD  GERD  We will restart home medication regimen as indicated    ----------  -DVT prophylaxis: Subcu heparin  -Activity: Ad christin.  -Expected length of stay: INPATIENT status due to the need for care which can only be reasonably provided in an hospital setting such as aggressive/expedited ancillary services and/or consultation services, the necessity for IV medications, close physician monitoring and/or the possible need for procedures.  In such, I feel patient’s risk for adverse outcomes and need for care warrant INPATIENT evaluation and predict the patient’s care encounter to likely last beyond 2 midnights.   -Disposition pending course    High risk secondary to ESRD with missed sessions, uncontrolled HTN    There are no questions and answers to display.       Babatunde Emery PA-C    08/19/23  13:32 EDT

## 2023-08-19 NOTE — CONSULTS
Nephrology Consultation Note    Referring Provider: Dr Woodruff  Reason for Consultation: Dialysis    Chief complaint worsening diarrhea and inability to do dialysis    Subjective .     History of present illness: Patient came to the hospital as she has not received dialysis for 5 days.  Her care provider has been in the ICU here and he is the one who assists her with dialysis.  She has had worsening diarrhea.  She has been nauseated without abdominal pain hematemesis melena fever or hematochezia.    She says she is able to do the connections but she is unable to lift the bags as she is very frail.  She has not experienced any shortness of breath.  She denies any chest pain    Hemodynamic data reviewed and blood pressure is high.  Patient no longer experiences orthostasis      All other systems were reviewed and negative.  Has not took any antibiotics recently.  Feels weak from the diarrhea    History  Past Medical History:   Diagnosis Date    Arthritis     Closed fracture of right fibula and tibia 2018    Depression     Diabetic ulcer of left foot associated with type 2 diabetes mellitus 2017    Diastolic dysfunction     Disease of thyroid gland     Elevated cholesterol     End stage renal disease     Essential hypertension     GERD (gastroesophageal reflux disease)     History of transfusion     Hyperlipidemia     Iron deficiency anemia 2018    PAD (peripheral artery disease)     Renal failure     Type 2 diabetes mellitus with hyperglycemia, with long-term current use of insulin 2018   ,   Past Surgical History:   Procedure Laterality Date    ABDOMINAL SURGERY      BACK SURGERY      Post spinal diskectomy, osteophytectomy in one lumbar interspace    CATARACT EXTRACTION      Left      SECTION      DENTAL PROCEDURE      ENDOSCOPY      FRACTURE SURGERY      right leg    HYSTERECTOMY      INCISION AND DRAINAGE LEG Left 4/15/2017     Procedure: INCISION AND DRAINAGE LOWER EXTREMITY;  Surgeon: Basilio Morris MD;  Location: [facility] OR;  Service:     INCISION AND DRAINAGE LEG Left 5/26/2017    Procedure: Irrigation and Debridment abcess diabetic wound left foot with deep culture;  Surgeon: Basilio Morris MD;  Location: [facility] OR;  Service:     INSERTION PERITONEAL DIALYSIS CATHETER N/A 12/16/2021    Procedure: INSERTION PERITONEAL DIALYSIS CATHETER LAPAROSCOPIC;  Surgeon: Edy Glover MD;  Location: [facility] OR;  Service: General;  Laterality: N/A;    KNEE ARTHROSCOPY Right     ORIF TIBIA FRACTURE Right 12/28/2018    Procedure: TIBIAL  FRACTURE OPEN REDUCTION INTERNAL FIXATION;  Surgeon: Jung Barragan MD;  Location: [facility] OR;  Service: Orthopedics    TOE AMPUTATION Right     5th    TUBAL ABDOMINAL LIGATION     ,   Family History   Problem Relation Age of Onset    Heart disease Mother     Cancer Mother     COPD Mother     Diabetes Other     Depression Other    ,   Social History     Tobacco Use    Smoking status: Never    Smokeless tobacco: Never   Vaping Use    Vaping Use: Never used   Substance Use Topics    Alcohol use: No    Drug use: No    and Allergies:  Penicillins, Codeine, and Sulfa antibiotics      Vital Signs   Temp:  [98.5 °F (36.9 °C)-98.7 °F (37.1 °C)] 98.5 °F (36.9 °C)  Heart Rate:  [67-79] 79  Resp:  [18-20] 20  BP: (151-208)/(65-99) 165/71    Physical Exam:     General Appearance:    Alert, cooperative, no distress.  Looks very frail   Head:    Normocephalic, without obvious abnormality   Eyes:            Conjunctivae and sclerae normal, no icterus, no pallor, pupils CCERLA   Ears:    Ears appear intact    Throat:   No oral lesions, no thrush, oral mucosa moist   Neck:   no JVD       Lungs:     Clear to auscultation,respirations regular    Heart:    Regular rhythm and normal rate, normal S1 and S2       Abdomen:     Normal bowel sounds, no tenderness       Extremities: No edema   Pulses:   Pulses palpable    Skin:   No  petechiae, no nodules, bruising or rash       Neurologic: No myoclonus.  Oriented.     Results Review:   I reviewed the patient's new clinical results.      Lab Results (last 24 hours)       Procedure Component Value Units Date/Time    Fibrinogen [[id number]]  (Normal) Collected: 08/19/23 1915    Specimen: Blood Updated: 08/19/23 1929     Fibrinogen 375 mg/dL     T4, Free [[id number]]  (Abnormal) Collected: 08/19/23 1456    Specimen: Blood Updated: 08/19/23 1904     Free T4 0.39 ng/dL     Narrative:      Results may be falsely increased if patient taking Biotin.      Hepatitis Panel, Acute [[id number]]  (Normal) Collected: 08/19/23 1542    Specimen: Blood Updated: 08/19/23 1613     Hepatitis B Surface Ag Non-Reactive     Hep A IgM Non-Reactive     Hep B C IgM Non-Reactive     Hepatitis C Ab Non-Reactive    Narrative:      Results may be falsely decreased if patient taking Biotin.     TSH [[id number]]  (Abnormal) Collected: 08/19/23 1414    Specimen: Blood Updated: 08/19/23 1603     .900 uIU/mL     COVID PRE-OP / PRE-PROCEDURE SCREENING ORDER (NO ISOLATION) - Swab, Nasopharynx [[id number]]  (Normal) Collected: 08/19/23 1522    Specimen: Swab from Nasopharynx Updated: 08/19/23 2589    Narrative:      The following orders were created for panel order COVID PRE-OP / PRE-PROCEDURE SCREENING ORDER (NO ISOLATION) - Swab, Nasopharynx.  Procedure                               Abnormality         Status                     ---------                               -----------         ------                     COVID-19 and FLU A/B PCR...[[id number]]  Normal              Final result                 Please view results for these tests on the individual orders.    COVID-19 and FLU A/B PCR - Swab, Nasopharynx [[id number]]  (Normal) Collected: 08/19/23 1522    Specimen: Swab from Nasopharynx Updated: 08/19/23 7139     COVID19 Not Detected     Influenza A PCR Not Detected     Influenza B PCR Not Detected    Narrative:      Fact  sheet for providers: https://www.fda.gov/media/670771/download    Fact sheet for patients: https://www.fda.gov/media/745218/download    Test performed by PCR.    Vitamin B12 [[id number]] Collected: 08/19/23 1456    Specimen: Blood Updated: 08/19/23 1511    High Sensitivity Troponin T 2Hr [[id number]]  (Abnormal) Collected: 08/19/23 1414    Specimen: Blood Updated: 08/19/23 1448     HS Troponin T 150 ng/L      Troponin T Delta -8 ng/L     Narrative:      High Sensitive Troponin T Reference Range:  <10.0 ng/L- Negative Female for AMI  <15.0 ng/L- Negative Male for AMI  >=10 - Abnormal Female indicating possible myocardial injury.  >=15 - Abnormal Male indicating possible myocardial injury.   Clinicians would have to utilize clinical acumen, EKG, Troponin, and serial changes to determine if it is an Acute Myocardial Infarction or myocardial injury due to an underlying chronic condition.         Urinalysis, Microscopic Only - Urine, Clean Catch [[id number]]  (Abnormal) Collected: 08/19/23 1337    Specimen: Urine, Clean Catch Updated: 08/19/23 1349     RBC, UA 3-5 /HPF      WBC, UA 3-5 /HPF      Comment: Urine culture not indicated.        Bacteria, UA None Seen /HPF      Squamous Epithelial Cells, UA 0-2 /HPF      Hyaline Casts, UA None Seen /LPF      Methodology Automated Microscopy    Urinalysis With Culture If Indicated - Urine, Clean Catch [[id number]]  (Abnormal) Collected: 08/19/23 1337    Specimen: Urine, Clean Catch Updated: 08/19/23 1349     Color, UA Yellow     Appearance, UA Clear     pH, UA 6.0     Specific Gravity, UA 1.011     Glucose,  mg/dL (2+)     Ketones, UA Negative     Bilirubin, UA Negative     Blood, UA Negative     Protein,  mg/dL (2+)     Leuk Esterase, UA Negative     Nitrite, UA Negative     Urobilinogen, UA 0.2 E.U./dL    Narrative:      In absence of clinical symptoms, the presence of pyuria, bacteria, and/or nitrites on the urinalysis result does not correlate with infection.     High Sensitivity Troponin T [[id number]]  (Abnormal) Collected: 08/19/23 1109    Specimen: Blood Updated: 08/19/23 1347     HS Troponin T 158 ng/L     Narrative:      High Sensitive Troponin T Reference Range:  <10.0 ng/L- Negative Female for AMI  <15.0 ng/L- Negative Male for AMI  >=10 - Abnormal Female indicating possible myocardial injury.  >=15 - Abnormal Male indicating possible myocardial injury.   Clinicians would have to utilize clinical acumen, EKG, Troponin, and serial changes to determine if it is an Acute Myocardial Infarction or myocardial injury due to an underlying chronic condition.         Protime-INR [[id number]]  (Abnormal) Collected: 08/19/23 1109    Specimen: Blood Updated: 08/19/23 1141     Protime 12.1 Seconds      INR 0.85    Narrative:      Suggested INR therapeutic range for stable oral anticoagulant therapy:    Low Intensity therapy:   1.5-2.0  Moderate Intensity therapy:   2.0-3.0  High Intensity therapy:   2.5-4.0    aPTT [[id number]]  (Abnormal) Collected: 08/19/23 1109    Specimen: Blood Updated: 08/19/23 1141     PTT 22.8 seconds     Narrative:      PTT Heparin Therapeutic Range:  59 - 95 seconds      Comprehensive Metabolic Panel [[id number]]  (Abnormal) Collected: 08/19/23 1109    Specimen: Blood Updated: 08/19/23 1139     Glucose 264 mg/dL      BUN 55 mg/dL      Creatinine 5.19 mg/dL      Sodium 138 mmol/L      Potassium 4.0 mmol/L      Comment: Slight hemolysis detected by analyzer. Results may be affected.        Chloride 102 mmol/L      CO2 22.2 mmol/L      Calcium 7.4 mg/dL      Total Protein 6.0 g/dL      Albumin 3.3 g/dL      ALT (SGPT) 13 U/L      AST (SGOT) 16 U/L      Alkaline Phosphatase 82 U/L      Total Bilirubin 0.3 mg/dL      Globulin 2.7 gm/dL      A/G Ratio 1.2 g/dL      BUN/Creatinine Ratio 10.6     Anion Gap 13.8 mmol/L      eGFR 8.7 mL/min/1.73      Comment: <15 Indicative of kidney failure       Narrative:      GFR Normal >60  Chronic Kidney Disease <60  Kidney  Failure <15      Magnesium [[id number]]  (Normal) Collected: 08/19/23 1109    Specimen: Blood Updated: 08/19/23 1139     Magnesium 2.2 mg/dL     Phosphorus [[id number]]  (Abnormal) Collected: 08/19/23 1109    Specimen: Blood Updated: 08/19/23 1139     Phosphorus 6.4 mg/dL     CBC & Differential [[id number]]  (Abnormal) Collected: 08/19/23 1109    Specimen: Blood Updated: 08/19/23 1115    Narrative:      The following orders were created for panel order CBC & Differential.  Procedure                               Abnormality         Status                     ---------                               -----------         ------                     CBC Auto Differential[[id number]]        Abnormal            Final result                 Please view results for these tests on the individual orders.    CBC Auto Differential [[id number]]  (Abnormal) Collected: 08/19/23 1109    Specimen: Blood Updated: 08/19/23 1115     WBC 5.95 10*3/mm3      RBC 3.38 10*6/mm3      Hemoglobin 10.1 g/dL      Hematocrit 31.3 %      MCV 92.6 fL      MCH 29.9 pg      MCHC 32.3 g/dL      RDW 14.0 %      RDW-SD 46.3 fl      MPV 9.7 fL      Platelets 106 10*3/mm3      Neutrophil % 75.9 %      Lymphocyte % 13.9 %      Monocyte % 5.0 %      Eosinophil % 4.2 %      Basophil % 0.5 %      Immature Grans % 0.5 %      Neutrophils, Absolute 4.51 10*3/mm3      Lymphocytes, Absolute 0.83 10*3/mm3      Monocytes, Absolute 0.30 10*3/mm3      Eosinophils, Absolute 0.25 10*3/mm3      Basophils, Absolute 0.03 10*3/mm3      Immature Grans, Absolute 0.03 10*3/mm3      nRBC 0.0 /100 WBC             Imaging Results (Last 24 Hours)       Procedure Component Value Units Date/Time    XR Chest AP [[id number]] Collected: 08/19/23 1452     Updated: 08/19/23 1455    Narrative:      Procedure: Frontal view of chest obtained.     Comparison: None available     History: weakness; N18.9-Chronic kidney disease, unspecified;  Z78.9-Other specified health status     Findings:     No  pneumonia or acute process seen in the chest.  Normal heart size and mediastinal contours.  Trachea is in midline position.  No edema or effusion is seen.  There is no evidence of pneumothorax.       Impression:         No evidence of acute disease in the chest.     This report was finalized on 8/19/2023 2:53 PM by Steven Melton MD.                         Assessment and Plan:    1.  End-stage renal disease on peritoneal dialysis  2.  Type 2 diabetes with nephropathy  3.  Chronic diarrhea now worse  4.  New onset hypothyroidism  5.  Frailty  6.  Hypertension  7.  Malnutrition  8.  Diabetic neuropathy and myopathy with inability to ambulate independently      Discussed the case with primary team  Given orders for peritoneal dialysis.  Ordered as needed clonidine.          Han Sandy MD  08/19/23  19:45 EDT

## 2023-08-19 NOTE — ED PROVIDER NOTES
Subjective   History of Present Illness  Patient is a 63-year-old female who presents to the emergency room today with a self-care deficit.  Patient gets peritoneal dialysis and states that she has not had it for about 5 days.  Patient reports that her caretaker is in the ICU being treated.  Patient reports weakness, leg pain at times, and headaches.  Patient reports that her blood pressures also been somewhat high.  Patient denies any chest pain or shortness of breath.      Review of Systems   Constitutional: Negative.    HENT: Negative.     Eyes: Negative.    Respiratory: Negative.     Cardiovascular: Negative.    Gastrointestinal: Negative.    Endocrine: Negative.    Genitourinary: Negative.    Musculoskeletal: Negative.    Skin: Negative.    Allergic/Immunologic: Negative.    Neurological: Negative.    Hematological: Negative.    Psychiatric/Behavioral: Negative.       Past Medical History:   Diagnosis Date    Arthritis     Closed fracture of right fibula and tibia 12/25/2018    Depression     Diabetic ulcer of left foot associated with type 2 diabetes mellitus 6/23/2017    Diastolic dysfunction     Disease of thyroid gland     Elevated cholesterol     End stage renal disease     Essential hypertension     GERD (gastroesophageal reflux disease)     History of transfusion     Hyperlipidemia     Iron deficiency anemia 12/30/2018    PAD (peripheral artery disease)     Renal failure     Type 2 diabetes mellitus with hyperglycemia, with long-term current use of insulin 12/30/2018       Allergies   Allergen Reactions    Penicillins Hives and GI Intolerance     Patient has received cefazolin, ceftriaxone and cefepime during past admissions.    Codeine Hives, Rash and GI Intolerance     Pt tolerates [place] @ home    Sulfa Antibiotics Hives, Rash and GI Intolerance     NAUSEA TOO       Past Surgical History:   Procedure Laterality Date    ABDOMINAL SURGERY      BACK SURGERY      Post spinal diskectomy, osteophytectomy in  one lumbar interspace    CATARACT EXTRACTION      Left      SECTION      DENTAL PROCEDURE      ENDOSCOPY      FRACTURE SURGERY      right leg    HYSTERECTOMY      INCISION AND DRAINAGE LEG Left 4/15/2017    Procedure: INCISION AND DRAINAGE LOWER EXTREMITY;  Surgeon: Basilio Morris MD;  Location: [facility] OR;  Service:     INCISION AND DRAINAGE LEG Left 2017    Procedure: Irrigation and Debridment abcess diabetic wound left foot with deep culture;  Surgeon: Basilio Morris MD;  Location: [facility] OR;  Service:     INSERTION PERITONEAL DIALYSIS CATHETER N/A 2021    Procedure: INSERTION PERITONEAL DIALYSIS CATHETER LAPAROSCOPIC;  Surgeon: Edy Glover MD;  Location: [facility] OR;  Service: General;  Laterality: N/A;    KNEE ARTHROSCOPY Right     ORIF TIBIA FRACTURE Right 2018    Procedure: TIBIAL  FRACTURE OPEN REDUCTION INTERNAL FIXATION;  Surgeon: Jung Barragan MD;  Location: [facility] OR;  Service: Orthopedics    TOE AMPUTATION Right     5th    TUBAL ABDOMINAL LIGATION         Family History   Problem Relation Age of Onset    Heart disease Mother     Cancer Mother     COPD Mother     Diabetes Other     Depression Other        Social History     Socioeconomic History    Marital status:    Tobacco Use    Smoking status: Never    Smokeless tobacco: Never   Vaping Use    Vaping Use: Never used   Substance and Sexual Activity    Alcohol use: No    Drug use: No    Sexual activity: Defer           Objective   Physical Exam  Vitals and nursing note reviewed.   Constitutional:       Appearance: She is well-developed.   HENT:      Head: Normocephalic.      Right Ear: External ear normal.      Left Ear: External ear normal.   Eyes:      Conjunctiva/sclera: Conjunctivae normal.      Pupils: Pupils are equal, round, and reactive to light.   Cardiovascular:      Rate and Rhythm: Normal rate and regular rhythm.      Heart sounds: Normal heart sounds.   Pulmonary:      Effort: Pulmonary effort is  normal.      Breath sounds: Normal breath sounds.   Abdominal:      General: Bowel sounds are normal.      Palpations: Abdomen is soft.   Musculoskeletal:         General: Normal range of motion.      Cervical back: Normal range of motion and neck supple.   Skin:     General: Skin is warm and dry.      Capillary Refill: Capillary refill takes less than 2 seconds.   Neurological:      Mental Status: She is alert and oriented to person, place, and time.   Psychiatric:         Behavior: Behavior normal.         Thought Content: Thought content normal.       Procedures           ED Course  ED Course as of 08/22/23 1314   Sat Aug 19, 2023   1149 ECG 12 Lead Other; HTN  Normal sinus rhythm.  Rate 66.  Normal axis.  Prolonged QT interval.  Nonspecific T wave changes.  No ST elevation or depression.  Abnormal EKG.  Interpreted by me.  Electronically signed by Yemi Bustamante MD, 08/19/23, 11:49 AM EDT.   [BC]      ED Course User Index  [BC] Yemi Bustamante MD                                           Medical Decision Making  Problems Addressed:  Chronic renal failure, unspecified CKD stage: complicated acute illness or injury  Self-care deficit: complicated acute illness or injury    Amount and/or Complexity of Data Reviewed  Labs: ordered.  Radiology: ordered.  ECG/medicine tests: ordered. Decision-making details documented in ED Course.    Risk  Prescription drug management.  Decision regarding hospitalization.        Final diagnoses:   Chronic renal failure, unspecified CKD stage   Self-care deficit       ED Disposition  ED Disposition       ED Disposition   Decision to Admit    Condition   --    Comment   Level of Care: Telemetry [5]   Diagnosis: Renal failure [318426]   Certification: I Certify That Inpatient Hospital Services Are Medically Necessary For Greater Than 2 Midnights                 Susan Stephens, APRN  [address] [id number]  [phone]               Medication List        Stop      hydrALAZINE  10 MG tablet  Commonly known as: APRESOLINE     lisinopril 20 MG tablet  Commonly known as: PRINIVIL,ZESTRIL               Where to Get Your Medications        Information about where to get these medications is not yet available    Ask your nurse or doctor about these medications  isosorbide mononitrate 30 MG 24 hr tablet            Darin Veronica, APRN  08/22/23 8915

## 2023-08-20 LAB
ALBUMIN SERPL-MCNC: 2.6 G/DL (ref 3.5–5.2)
ALBUMIN/GLOB SERPL: 1.2 G/DL
ALP SERPL-CCNC: 63 U/L (ref 39–117)
ALT SERPL W P-5'-P-CCNC: 9 U/L (ref 1–33)
ANION GAP SERPL CALCULATED.3IONS-SCNC: 13 MMOL/L (ref 5–15)
AST SERPL-CCNC: 11 U/L (ref 1–32)
BASOPHILS # BLD AUTO: 0.04 10*3/MM3 (ref 0–0.2)
BASOPHILS NFR BLD AUTO: 0.7 % (ref 0–1.5)
BILIRUB SERPL-MCNC: 0.2 MG/DL (ref 0–1.2)
BUN SERPL-MCNC: 49 MG/DL (ref 8–23)
BUN/CREAT SERPL: 10 (ref 7–25)
CALCIUM SPEC-SCNC: 7.1 MG/DL (ref 8.6–10.5)
CHLORIDE SERPL-SCNC: 104 MMOL/L (ref 98–107)
CO2 SERPL-SCNC: 21 MMOL/L (ref 22–29)
CREAT SERPL-MCNC: 4.89 MG/DL (ref 0.57–1)
DEPRECATED RDW RBC AUTO: 48.9 FL (ref 37–54)
EGFRCR SERPLBLD CKD-EPI 2021: 9.3 ML/MIN/1.73
EOSINOPHIL # BLD AUTO: 0.2 10*3/MM3 (ref 0–0.4)
EOSINOPHIL NFR BLD AUTO: 3.4 % (ref 0.3–6.2)
ERYTHROCYTE [DISTWIDTH] IN BLOOD BY AUTOMATED COUNT: 14.6 % (ref 12.3–15.4)
FERRITIN SERPL-MCNC: 192 NG/ML (ref 13–150)
FOLATE SERPL-MCNC: 9.03 NG/ML (ref 4.78–24.2)
GLOBULIN UR ELPH-MCNC: 2.1 GM/DL
GLUCOSE BLDC GLUCOMTR-MCNC: 128 MG/DL (ref 70–130)
GLUCOSE BLDC GLUCOMTR-MCNC: 281 MG/DL (ref 70–130)
GLUCOSE BLDC GLUCOMTR-MCNC: 301 MG/DL (ref 70–130)
GLUCOSE BLDC GLUCOMTR-MCNC: 313 MG/DL (ref 70–130)
GLUCOSE SERPL-MCNC: 366 MG/DL (ref 65–99)
HCT VFR BLD AUTO: 25.3 % (ref 34–46.6)
HGB BLD-MCNC: 8 G/DL (ref 12–15.9)
IMM GRANULOCYTES # BLD AUTO: 0.03 10*3/MM3 (ref 0–0.05)
IMM GRANULOCYTES NFR BLD AUTO: 0.5 % (ref 0–0.5)
IRON 24H UR-MRATE: 27 MCG/DL (ref 37–145)
IRON SATN MFR SERPL: 14 % (ref 20–50)
LYMPHOCYTES # BLD AUTO: 1.22 10*3/MM3 (ref 0.7–3.1)
LYMPHOCYTES NFR BLD AUTO: 20.5 % (ref 19.6–45.3)
MCH RBC QN AUTO: 29.9 PG (ref 26.6–33)
MCHC RBC AUTO-ENTMCNC: 31.6 G/DL (ref 31.5–35.7)
MCV RBC AUTO: 94.4 FL (ref 79–97)
MONOCYTES # BLD AUTO: 0.35 10*3/MM3 (ref 0.1–0.9)
MONOCYTES NFR BLD AUTO: 5.9 % (ref 5–12)
NEUTROPHILS NFR BLD AUTO: 4.1 10*3/MM3 (ref 1.7–7)
NEUTROPHILS NFR BLD AUTO: 69 % (ref 42.7–76)
NRBC BLD AUTO-RTO: 0 /100 WBC (ref 0–0.2)
PLATELET # BLD AUTO: 120 10*3/MM3 (ref 140–450)
PMV BLD AUTO: 10.1 FL (ref 6–12)
POTASSIUM SERPL-SCNC: 3.6 MMOL/L (ref 3.5–5.2)
PROT SERPL-MCNC: 4.7 G/DL (ref 6–8.5)
QT INTERVAL: 486 MS
QTC INTERVAL: 509 MS
RBC # BLD AUTO: 2.68 10*6/MM3 (ref 3.77–5.28)
SODIUM SERPL-SCNC: 138 MMOL/L (ref 136–145)
TIBC SERPL-MCNC: 200 MCG/DL (ref 298–536)
TRANSFERRIN SERPL-MCNC: 134 MG/DL (ref 200–360)
VIT B12 BLD-MCNC: 492 PG/ML (ref 211–946)
WBC NRBC COR # BLD: 5.94 10*3/MM3 (ref 3.4–10.8)

## 2023-08-20 PROCEDURE — 25010000002 HEPARIN (PORCINE) PER 1000 UNITS: Performed by: HOSPITALIST

## 2023-08-20 PROCEDURE — 82948 REAGENT STRIP/BLOOD GLUCOSE: CPT

## 2023-08-20 PROCEDURE — 83540 ASSAY OF IRON: CPT | Performed by: INTERNAL MEDICINE

## 2023-08-20 PROCEDURE — 82728 ASSAY OF FERRITIN: CPT | Performed by: INTERNAL MEDICINE

## 2023-08-20 PROCEDURE — 99232 SBSQ HOSP IP/OBS MODERATE 35: CPT | Performed by: INTERNAL MEDICINE

## 2023-08-20 PROCEDURE — 82746 ASSAY OF FOLIC ACID SERUM: CPT | Performed by: HOSPITALIST

## 2023-08-20 PROCEDURE — 63710000001 INSULIN LISPRO (HUMAN) PER 5 UNITS: Performed by: HOSPITALIST

## 2023-08-20 PROCEDURE — 85025 COMPLETE CBC W/AUTO DIFF WBC: CPT | Performed by: HOSPITALIST

## 2023-08-20 PROCEDURE — 84466 ASSAY OF TRANSFERRIN: CPT | Performed by: INTERNAL MEDICINE

## 2023-08-20 PROCEDURE — 63710000001 INSULIN GLARGINE PER 5 UNITS: Performed by: HOSPITALIST

## 2023-08-20 PROCEDURE — 80053 COMPREHEN METABOLIC PANEL: CPT | Performed by: HOSPITALIST

## 2023-08-20 RX ADMIN — ROPINIROLE HYDROCHLORIDE 0.5 MG: 0.25 TABLET, FILM COATED ORAL at 21:26

## 2023-08-20 RX ADMIN — ISOSORBIDE MONONITRATE 30 MG: 30 TABLET, EXTENDED RELEASE ORAL at 06:37

## 2023-08-20 RX ADMIN — PANTOPRAZOLE SODIUM 40 MG: 40 TABLET, DELAYED RELEASE ORAL at 08:38

## 2023-08-20 RX ADMIN — INSULIN LISPRO 5 UNITS: 100 INJECTION, SOLUTION INTRAVENOUS; SUBCUTANEOUS at 21:33

## 2023-08-20 RX ADMIN — INSULIN LISPRO 5 UNITS: 100 INJECTION, SOLUTION INTRAVENOUS; SUBCUTANEOUS at 08:44

## 2023-08-20 RX ADMIN — LEVOTHYROXINE SODIUM 25 MCG: 25 TABLET ORAL at 08:38

## 2023-08-20 RX ADMIN — Medication 1 TABLET: at 08:37

## 2023-08-20 RX ADMIN — TRAZODONE HYDROCHLORIDE 50 MG: 50 TABLET ORAL at 21:26

## 2023-08-20 RX ADMIN — FLUOXETINE HYDROCHLORIDE 40 MG: 20 CAPSULE ORAL at 06:37

## 2023-08-20 RX ADMIN — Medication 10 ML: at 08:38

## 2023-08-20 RX ADMIN — TIZANIDINE 4 MG: 4 TABLET ORAL at 08:44

## 2023-08-20 RX ADMIN — LOPERAMIDE HYDROCHLORIDE 2 MG: 2 CAPSULE ORAL at 21:25

## 2023-08-20 RX ADMIN — Medication 10 ML: at 21:26

## 2023-08-20 RX ADMIN — ROPINIROLE HYDROCHLORIDE 0.5 MG: 0.25 TABLET, FILM COATED ORAL at 08:38

## 2023-08-20 RX ADMIN — GABAPENTIN 300 MG: 300 CAPSULE ORAL at 08:38

## 2023-08-20 RX ADMIN — AMLODIPINE BESYLATE 10 MG: 10 TABLET ORAL at 08:37

## 2023-08-20 RX ADMIN — INSULIN LISPRO 4 UNITS: 100 INJECTION, SOLUTION INTRAVENOUS; SUBCUTANEOUS at 11:57

## 2023-08-20 RX ADMIN — HEPARIN SODIUM 5000 UNITS: 5000 INJECTION INTRAVENOUS; SUBCUTANEOUS at 08:44

## 2023-08-20 RX ADMIN — INSULIN GLARGINE 6 UNITS: 100 INJECTION, SOLUTION SUBCUTANEOUS at 21:26

## 2023-08-20 RX ADMIN — ATORVASTATIN CALCIUM 80 MG: 40 TABLET, FILM COATED ORAL at 21:25

## 2023-08-20 RX ADMIN — ASPIRIN 81 MG: 81 TABLET, COATED ORAL at 08:38

## 2023-08-20 RX ADMIN — HYDROCODONE BITARTRATE AND ACETAMINOPHEN 1 TABLET: 10; 325 TABLET ORAL at 21:36

## 2023-08-20 RX ADMIN — HEPARIN SODIUM 5000 UNITS: 5000 INJECTION INTRAVENOUS; SUBCUTANEOUS at 21:25

## 2023-08-20 RX ADMIN — METOPROLOL SUCCINATE 25 MG: 25 TABLET, EXTENDED RELEASE ORAL at 08:37

## 2023-08-20 NOTE — PLAN OF CARE
Problem: Adult Inpatient Plan of Care  Goal: Absence of Hospital-Acquired Illness or Injury  Intervention: Prevent Skin Injury  Recent Flowsheet Documentation  Taken 8/19/2023 1919 by Clover Kaye RN  Body Position:   position changed independently   supine  Skin Protection:   adhesive use limited   incontinence pads utilized     Problem: Adult Inpatient Plan of Care  Goal: Absence of Hospital-Acquired Illness or Injury  Intervention: Prevent and Manage VTE (Venous Thromboembolism) Risk  Recent Flowsheet Documentation  Taken 8/19/2023 1919 by Clover Kaye, RN  Activity Management: activity encouraged   Goal Outcome Evaluation:

## 2023-08-21 LAB
ADV 40+41 DNA STL QL NAA+NON-PROBE: NOT DETECTED
ANION GAP SERPL CALCULATED.3IONS-SCNC: 13.2 MMOL/L (ref 5–15)
ASTRO TYP 1-8 RNA STL QL NAA+NON-PROBE: NOT DETECTED
BUN SERPL-MCNC: 43 MG/DL (ref 8–23)
BUN/CREAT SERPL: 8.8 (ref 7–25)
C CAYETANENSIS DNA STL QL NAA+NON-PROBE: NOT DETECTED
C COLI+JEJ+UPSA DNA STL QL NAA+NON-PROBE: NOT DETECTED
CALCIUM SPEC-SCNC: 7.2 MG/DL (ref 8.6–10.5)
CHLORIDE SERPL-SCNC: 100 MMOL/L (ref 98–107)
CO2 SERPL-SCNC: 23.8 MMOL/L (ref 22–29)
CREAT SERPL-MCNC: 4.89 MG/DL (ref 0.57–1)
CRYPTOSP DNA STL QL NAA+NON-PROBE: NOT DETECTED
E HISTOLYT DNA STL QL NAA+NON-PROBE: NOT DETECTED
EAEC PAA PLAS AGGR+AATA ST NAA+NON-PRB: NOT DETECTED
EC STX1+STX2 GENES STL QL NAA+NON-PROBE: NOT DETECTED
EGFRCR SERPLBLD CKD-EPI 2021: 9.3 ML/MIN/1.73
EPEC EAE GENE STL QL NAA+NON-PROBE: NOT DETECTED
ETEC LTA+ST1A+ST1B TOX ST NAA+NON-PROBE: NOT DETECTED
G LAMBLIA DNA STL QL NAA+NON-PROBE: NOT DETECTED
GLUCOSE BLDC GLUCOMTR-MCNC: 112 MG/DL (ref 70–130)
GLUCOSE BLDC GLUCOMTR-MCNC: 201 MG/DL (ref 70–130)
GLUCOSE BLDC GLUCOMTR-MCNC: 206 MG/DL (ref 70–130)
GLUCOSE BLDC GLUCOMTR-MCNC: 226 MG/DL (ref 70–130)
GLUCOSE SERPL-MCNC: 209 MG/DL (ref 65–99)
NOROVIRUS GI+II RNA STL QL NAA+NON-PROBE: NOT DETECTED
P SHIGELLOIDES DNA STL QL NAA+NON-PROBE: NOT DETECTED
POTASSIUM SERPL-SCNC: 3.2 MMOL/L (ref 3.5–5.2)
RVA RNA STL QL NAA+NON-PROBE: NOT DETECTED
S ENT+BONG DNA STL QL NAA+NON-PROBE: NOT DETECTED
SAPO I+II+IV+V RNA STL QL NAA+NON-PROBE: NOT DETECTED
SHIGELLA SP+EIEC IPAH ST NAA+NON-PROBE: NOT DETECTED
SODIUM SERPL-SCNC: 137 MMOL/L (ref 136–145)
V CHOL+PARA+VUL DNA STL QL NAA+NON-PROBE: NOT DETECTED
V CHOLERAE DNA STL QL NAA+NON-PROBE: NOT DETECTED
Y ENTEROCOL DNA STL QL NAA+NON-PROBE: NOT DETECTED

## 2023-08-21 PROCEDURE — 97166 OT EVAL MOD COMPLEX 45 MIN: CPT

## 2023-08-21 PROCEDURE — 25010000002 HEPARIN (PORCINE) PER 1000 UNITS: Performed by: HOSPITALIST

## 2023-08-21 PROCEDURE — 87507 IADNA-DNA/RNA PROBE TQ 12-25: CPT | Performed by: HOSPITALIST

## 2023-08-21 PROCEDURE — 63710000001 INSULIN LISPRO (HUMAN) PER 5 UNITS: Performed by: HOSPITALIST

## 2023-08-21 PROCEDURE — 97162 PT EVAL MOD COMPLEX 30 MIN: CPT

## 2023-08-21 PROCEDURE — 99232 SBSQ HOSP IP/OBS MODERATE 35: CPT | Performed by: INTERNAL MEDICINE

## 2023-08-21 PROCEDURE — 63710000001 INSULIN GLARGINE PER 5 UNITS: Performed by: HOSPITALIST

## 2023-08-21 PROCEDURE — 80048 BASIC METABOLIC PNL TOTAL CA: CPT | Performed by: INTERNAL MEDICINE

## 2023-08-21 PROCEDURE — 82948 REAGENT STRIP/BLOOD GLUCOSE: CPT

## 2023-08-21 RX ADMIN — HYDROXYZINE HYDROCHLORIDE 12.5 MG: 25 TABLET, FILM COATED ORAL at 21:21

## 2023-08-21 RX ADMIN — HEPARIN SODIUM 5000 UNITS: 5000 INJECTION INTRAVENOUS; SUBCUTANEOUS at 08:28

## 2023-08-21 RX ADMIN — LOPERAMIDE HYDROCHLORIDE 2 MG: 2 CAPSULE ORAL at 14:28

## 2023-08-21 RX ADMIN — INSULIN GLARGINE 6 UNITS: 100 INJECTION, SOLUTION SUBCUTANEOUS at 21:21

## 2023-08-21 RX ADMIN — ASPIRIN 81 MG: 81 TABLET, COATED ORAL at 08:28

## 2023-08-21 RX ADMIN — HYDROCODONE BITARTRATE AND ACETAMINOPHEN 1 TABLET: 10; 325 TABLET ORAL at 14:28

## 2023-08-21 RX ADMIN — TIZANIDINE 4 MG: 4 TABLET ORAL at 21:21

## 2023-08-21 RX ADMIN — Medication 1 TABLET: at 08:28

## 2023-08-21 RX ADMIN — INSULIN LISPRO 3 UNITS: 100 INJECTION, SOLUTION INTRAVENOUS; SUBCUTANEOUS at 17:10

## 2023-08-21 RX ADMIN — ATORVASTATIN CALCIUM 80 MG: 40 TABLET, FILM COATED ORAL at 21:21

## 2023-08-21 RX ADMIN — FLUOXETINE HYDROCHLORIDE 40 MG: 20 CAPSULE ORAL at 10:26

## 2023-08-21 RX ADMIN — Medication 10 ML: at 08:28

## 2023-08-21 RX ADMIN — INSULIN LISPRO 3 UNITS: 100 INJECTION, SOLUTION INTRAVENOUS; SUBCUTANEOUS at 12:29

## 2023-08-21 RX ADMIN — ISOSORBIDE MONONITRATE 30 MG: 30 TABLET, EXTENDED RELEASE ORAL at 10:27

## 2023-08-21 RX ADMIN — TRAZODONE HYDROCHLORIDE 50 MG: 50 TABLET ORAL at 21:21

## 2023-08-21 RX ADMIN — ROPINIROLE HYDROCHLORIDE 0.5 MG: 0.25 TABLET, FILM COATED ORAL at 21:21

## 2023-08-21 RX ADMIN — PANTOPRAZOLE SODIUM 40 MG: 40 TABLET, DELAYED RELEASE ORAL at 08:28

## 2023-08-21 RX ADMIN — LEVOTHYROXINE SODIUM 25 MCG: 25 TABLET ORAL at 08:28

## 2023-08-21 RX ADMIN — AMLODIPINE BESYLATE 10 MG: 10 TABLET ORAL at 08:27

## 2023-08-21 RX ADMIN — HEPARIN SODIUM 5000 UNITS: 5000 INJECTION INTRAVENOUS; SUBCUTANEOUS at 21:21

## 2023-08-21 RX ADMIN — GABAPENTIN 300 MG: 300 CAPSULE ORAL at 08:28

## 2023-08-21 RX ADMIN — ROPINIROLE HYDROCHLORIDE 0.5 MG: 0.25 TABLET, FILM COATED ORAL at 08:28

## 2023-08-21 RX ADMIN — METOPROLOL SUCCINATE 25 MG: 25 TABLET, EXTENDED RELEASE ORAL at 08:27

## 2023-08-21 RX ADMIN — Medication 10 ML: at 21:22

## 2023-08-21 NOTE — CASE MANAGEMENT/SOCIAL WORK
Discharge Planning Assessment  [facility]     Patient Name: Reny Elena  MRN: [id number]  Today's Date: 8/21/2023    Admit Date: 8/19/2023    Plan: SS received a consult for discharge planning. SS spoke with pt at bedside on this date. Pt lives at [address] [place] [address] in [facility]. PCP is Susan Stephens. Pt states she receives [facility]. Pt states she utilizes rollator, wheelchair, bedside commode, and Peritoneal Dialysis supplies. Pt does not have a POA/Living will. Pt plans to return home at discharge. Pt is unsure of transportation for discharge at this time. SS to follow and assist with discharge planning.   Discharge Needs Assessment       Row Name 08/21/23 1155       Living Environment    People in Home significant other    Name(s) of People in Home Signifiant other, Cliff    Current Living Arrangements home    Potentially Unsafe Housing Conditions none    Primary Care Provided by spouse/significant other    Quality of Family Relationships unable to assess    Able to Return to Prior Arrangements yes       Resource/Environmental Concerns    Resource/Environmental Concerns none    Transportation Concerns none       Transition Planning    Patient/Family Anticipates Transition to home with family    Patient/Family Anticipated Services at Transition none    Transportation Anticipated family or friend will provide       Discharge Needs Assessment    Equipment Currently Used at Home rollator;wheelchair;commode;other (see comments)  PD equipment via unknown provider.    Equipment Needed After Discharge none                   Discharge Plan       Row Name 08/21/23 1159       Plan    Plan SS received a consult for discharge planning. SS spoke with pt at bedside on this date. Pt lives at [address] [place] [address] in [facility]. PCP is Susan Stephens. Pt states she receives [facility]. Pt states she utilizes rollator, wheelchair, bedside commode, and  Peritoneal Dialysis supplies. Pt does not have a POA/Living will. Pt plans to return home at discharge. Pt is unsure of transportation for discharge at this time. SS to follow and assist with discharge planning.    Patient/Family in Agreement with Plan yes               Continued Care and Services - Admitted Since 8/19/2023    Coordination has not been started for this encounter.       Expected Discharge Date and Time       Expected Discharge Date Expected Discharge Time    Aug 21, 2023            Demographic Summary       Row Name 08/21/23 1154       General Information    Admission Type inpatient    Referral Source nursing    Reason for Consult discharge planning  SS received a consult for discharge planning.    Preferred Language English                  CAITY Julian

## 2023-08-21 NOTE — PLAN OF CARE
Goal Outcome Evaluation:    Pt is resting in bed with no s/s of distress noted. VSS. IV access maintained. Will continue with plan of care.

## 2023-08-21 NOTE — PROGRESS NOTES
Nephrology Progress Note      Subjective     Patient denied any chest pain, shortness of breath    Objective       Vital signs :     Temp:  [97.8 °F (36.6 °C)-98.5 °F (36.9 °C)] 97.8 °F (36.6 °C)  Heart Rate:  [64-70] 64  Resp:  [16-18] 18  BP: ()/(51-80) 162/80    Intake/Output                         08/19/23 0701 - 08/20/23 0700 08/20/23 0701 - 08/21/23 0700     [id number] 1840-4953 Total 5932-6409 2362-0570 Total                 Intake    P.O.  --  360 360  480  -- 480    Total Intake -- 360 360 480 -- 480       Output    Urine  --  150 150  875  -- 875    Dialysis  --  446 446  --  350 350    Total Output -- 596 596              Physical Exam:    General Appearance : alert, pleasant, appears stated age, cooperative and alert  Lungs : clear to auscultation, respirations regular  Heart :  regular rhythm & normal rate, normal S1, S2 and no murmur, no rub  Abdomen : soft, non distended  Extremities : No edema,   Neurologic :   orientated to person, place, time and situation, Grossly no focal deficits        Laboratory Data :     Albumin Albumin   Date Value Ref Range Status   08/20/2023 2.6 (L) 3.5 - 5.2 g/dL Final   08/19/2023 3.3 (L) 3.5 - 5.2 g/dL Final      Magnesium Magnesium   Date Value Ref Range Status   08/19/2023 2.2 1.6 - 2.4 mg/dL Final          PTH               No results found for: PTH    CBC and coagulation:  Results from last 7 days   Lab Units 08/20/23  0124 08/19/23  1109   WBC 10*3/mm3 5.94 5.95   HEMOGLOBIN g/dL 8.0* 10.1*   HEMATOCRIT % 25.3* 31.3*   MCV fL 94.4 92.6   MCHC g/dL 31.6 32.3   PLATELETS 10*3/mm3 120* 106*   INR   --  0.85*     Acid/base balance:      Renal and electrolytes:    Results from last 7 days   Lab Units 08/21/23  0138 08/20/23  0124 08/19/23  1109   SODIUM mmol/L 137 138 138   POTASSIUM mmol/L 3.2* 3.6 4.0   MAGNESIUM mg/dL  --   --  2.2   CHLORIDE mmol/L 100 104 102   CO2 mmol/L 23.8 21.0* 22.2   BUN mg/dL 43* 49* 55*   CREATININE mg/dL 4.89* 4.89*  5.19*   CALCIUM mg/dL 7.2* 7.1* 7.4*   PHOSPHORUS mg/dL  --   --  6.4*     Estimated Creatinine Clearance: 4.9 mL/min (A) (by C-G formula based on SCr of 4.89 mg/dL (H)).  @GFRCG:3@   Liver and pancreatic function:  Results from last 7 days   Lab Units 08/20/23  0124 08/19/23  1109   ALBUMIN g/dL 2.6* 3.3*   BILIRUBIN mg/dL 0.2 0.3   ALK PHOS U/L 63 82   AST (SGOT) U/L 11 16   ALT (SGPT) U/L 9 13         Cardiac:      Liver and pancreatic function:  Results from last 7 days   Lab Units 08/20/23  0124 08/19/23  1109   ALBUMIN g/dL 2.6* 3.3*   BILIRUBIN mg/dL 0.2 0.3   ALK PHOS U/L 63 82   AST (SGOT) U/L 11 16   ALT (SGPT) U/L 9 13       Medications :     amLODIPine, 10 mg, Oral, Daily  aspirin, 81 mg, Oral, Daily  atorvastatin, 80 mg, Oral, Nightly  FLUoxetine, 40 mg, Oral, QAM  gabapentin, 300 mg, Oral, Daily  heparin (porcine), 5,000 Units, Subcutaneous, Q12H  insulin glargine, 6 Units, Subcutaneous, Nightly  insulin lispro, 2-7 Units, Subcutaneous, 4x Daily AC & at Bedtime  isosorbide mononitrate, 30 mg, Oral, QAM  levothyroxine, 25 mcg, Oral, Daily  metoprolol succinate XL, 25 mg, Oral, Daily  multivitamin, 1 tablet, Oral, Daily  pantoprazole, 40 mg, Oral, Daily  rOPINIRole, 0.5 mg, Oral, BID  senna-docusate sodium, 2 tablet, Oral, BID  sodium chloride, 10 mL, Intravenous, Q12H  traZODone, 50 mg, Oral, Nightly             Assessment & Plan     1.  End-stage renal disease on peritoneal dialysis  2.  Type 2 diabetes with nephropathy  3.  Chronic diarrhea now worse  4.  New onset hypothyroidism  5.  Frailty  6.  Hypertension  7.  Malnutrition  8.  Diabetic neuropathy and myopathy with inability to ambulate independently  9.  Anemia of CKD    Continue on CCPD using 1.5% dialysate  Patient would like to continue on peritoneal dialysis for now and will let clinic know if nobody is able to assist her in nightly PD at home, to transition to hemodialysis  Follow-up iron studies      Nicholas Arroyo MD  08/21/23  08:02  EDT

## 2023-08-21 NOTE — PROGRESS NOTES
[facility]     Patient Identification:  Name:  Reny Elena  Age:  63 y.o.  Sex:  female  :  1960  MRN:  [id number]  Visit Number:  [id number]  Primary Care Provider:  Susan Stephens APRN    Length of stay:  2    Chief complaint: None    Subjective:    Patient again seen and examined at bedside with no nursing staff present.  Patient reports no significant change today compared to yesterday.  Patient states that she will have family available to assist with peritoneal dialysis tomorrow.  Unfortunately, they are not available today which will delay her anticipated date of discharge by an additional 24 hours.  ----------------------------------------------------------------------------------------------------------------------  Current Hospital Meds:  amLODIPine, 10 mg, Oral, Daily  aspirin, 81 mg, Oral, Daily  atorvastatin, 80 mg, Oral, Nightly  FLUoxetine, 40 mg, Oral, QAM  gabapentin, 300 mg, Oral, Daily  heparin (porcine), 5,000 Units, Subcutaneous, Q12H  insulin glargine, 6 Units, Subcutaneous, Nightly  insulin lispro, 2-7 Units, Subcutaneous, 4x Daily AC & at Bedtime  isosorbide mononitrate, 30 mg, Oral, QAM  levothyroxine, 25 mcg, Oral, Daily  metoprolol succinate XL, 25 mg, Oral, Daily  multivitamin, 1 tablet, Oral, Daily  pantoprazole, 40 mg, Oral, Daily  rOPINIRole, 0.5 mg, Oral, BID  senna-docusate sodium, 2 tablet, Oral, BID  sodium chloride, 10 mL, Intravenous, Q12H  traZODone, 50 mg, Oral, Nightly         ----------------------------------------------------------------------------------------------------------------------  Vital Signs:  Temp:  [97.8 °F (36.6 °C)-98 °F (36.7 °C)] 98 °F (36.7 °C)  Heart Rate:  [64-72] 72  Resp:  [16-18] 16  BP: (120-162)/(62-80) 120/62      23  1014 23  1718 23  0500   Weight: 52.6 kg (116 lb) 56 kg (123 lb 6.4 oz) 56 kg (123 lb 6.4 oz) (new admit)     Body mass index is 21.18 kg/m².    Intake/Output  Summary (Last 24 hours) at 8/21/2023 1555  Last data filed at 8/21/2023 1437  Gross per 24 hour   Intake 720 ml   Output 1125 ml   Net -405 ml       Diet: Regular/House Diet; Texture: Regular Texture (IDDSI 7); Fluid Consistency: Thin (IDDSI 0)  ----------------------------------------------------------------------------------------------------------------------  Physical exam:  Constitutional: Chronically ill-appearing  female in no apparent distress.     HENT:  Head:  Normocephalic and atraumatic.  Mouth:  Moist mucous membranes.    Eyes:  Conjunctivae and EOM are normal.  Pupils are equal, round, and reactive to light.  No scleral icterus.    Neck:  Neck supple. No thyromegaly.  No JVD present.    Cardiovascular:  Regular rate and rhythm with no murmurs, rubs, clicks or gallops appreciated.  Pulmonary/Chest:  Clear to auscultation bilaterally with no crackles, wheezes or rhonchi appreciated.  Abdominal:  Soft. Nontender. Nondistended  Bowel sounds are normal in all four quadrants. No organomegally appreciated.   Musculoskeletal:  No edema, no tenderness, and no deformity.  No red or swollen joints anywhere.    Neurological:  Alert, Cranial nerves II-XII intact with no focal deficits.  No facial droop.  No slurred speech.   Skin:  Warm and dry to palpation with no rashes or lesions appreciated.  Peripheral vascular:  2+ radial and pedal pulses in bilateral upper and lower extremities.  Psychiatric:  Alert and oriented x3, demonstrates appropriate judgment and insight.    No significant change in physical exam in comparison to 8/20/2023  -------------------------------------------------------------------------------------------------  ----------------------------------------------------------------------------------------------------------------------  Results from last 7 days   Lab Units 08/19/23  1414 08/19/23  1109   HSTROP T ng/L 150* 158*       Results from last 7 days   Lab Units 08/20/23  0124  08/19/23  1109   WBC 10*3/mm3 5.94 5.95   HEMOGLOBIN g/dL 8.0* 10.1*   HEMATOCRIT % 25.3* 31.3*   MCV fL 94.4 92.6   MCHC g/dL 31.6 32.3   PLATELETS 10*3/mm3 120* 106*   INR   --  0.85*           Results from last 7 days   Lab Units 08/21/23  0138 08/20/23  0124 08/19/23  1109   SODIUM mmol/L 137 138 138   POTASSIUM mmol/L 3.2* 3.6 4.0   MAGNESIUM mg/dL  --   --  2.2   CHLORIDE mmol/L 100 104 102   CO2 mmol/L 23.8 21.0* 22.2   BUN mg/dL 43* 49* 55*   CREATININE mg/dL 4.89* 4.89* 5.19*   CALCIUM mg/dL 7.2* 7.1* 7.4*   GLUCOSE mg/dL 209* 366* 264*   ALBUMIN g/dL  --  2.6* 3.3*   BILIRUBIN mg/dL  --  0.2 0.3   ALK PHOS U/L  --  63 82   AST (SGOT) U/L  --  11 16   ALT (SGPT) U/L  --  9 13     Estimated Creatinine Clearance: 10.1 mL/min (A) (by C-G formula based on SCr of 4.89 mg/dL (H)).    No results found for: AMMONIA      No results found for: BLOODCX  No results found for: URINECX  No results found for: WOUNDCX  No results found for: STOOLCX    I have personally looked at the labs and they are summarized above.  ----------------------------------------------------------------------------------------------------------------------  Imaging Results (Last 24 Hours)       ** No results found for the last 24 hours. **          ----------------------------------------------------------------------------------------------------------------------  Assessment and Plan:    ESRD on PD -continue peritoneal dialysis while inpatient    2.  Essential hypertension -well-controlled    3.  Type 2 diabetes mellitus -continue Accu-Cheks before every meal and nightly with sliding scale insulin    4.  Multiple electrolyte abnormalities -all electrolytes are slowly improving with resumption of peritoneal dialysis    5.  Newly diagnosed hypothyroidism -continue Synthroid 25 mcg p.o. daily    Disposition probable discharge tomorrow    Narayan Mayorga DO   08/21/23   15:55 EDT

## 2023-08-21 NOTE — PROGRESS NOTES
Nephrology Progress Note    Interval History:     Patient Complaints: Still has diarrhea.  Swelling better.  Blood pressure better.        Vital Signs  Temp:  [98.2 °F (36.8 °C)-99.9 °F (37.7 °C)] 98.2 °F (36.8 °C)  Heart Rate:  [65-96] 70  Resp:  [16-22] 18  BP: ()/(51-82) 90/51    Physical Exam:    General:           No distress      HEENT: No cyanosis               Neck: No JVD       Lungs:   Clear   Heart:  Regular,  no rub       Abdomen:   Normal bowel sounds, soft non-tender, non-distended, no guarding       Extremities: No edema                        Results Review:    I reviewed the patient's new clinical results.    Lab Results (last 24 hours)       Procedure Component Value Units Date/Time    POC Glucose Once [[id number]]  (Normal) Collected: 08/20/23 1628    Specimen: Blood Updated: 08/20/23 1646     Glucose 128 mg/dL     Folate [[id number]]  (Normal) Collected: 08/20/23 0124    Specimen: Blood Updated: 08/20/23 1231     Folate 9.03 ng/mL     Narrative:      Results may be falsely increased if patient taking Biotin.      Vitamin B12 [[id number]]  (Normal) Collected: 08/19/23 1456    Specimen: Blood Updated: 08/20/23 1230     Vitamin B-12 492 pg/mL     Narrative:      Results may be falsely increased if patient taking Biotin.      POC Glucose Once [[id number]]  (Abnormal) Collected: 08/20/23 1057    Specimen: Blood Updated: 08/20/23 1120     Glucose 281 mg/dL     POC Glucose Once [[id number]]  (Abnormal) Collected: 08/20/23 0653    Specimen: Blood Updated: 08/20/23 0715     Glucose 301 mg/dL     CBC Auto Differential [[id number]]  (Abnormal) Collected: 08/20/23 0124    Specimen: Blood Updated: 08/20/23 0221     WBC 5.94 10*3/mm3      RBC 2.68 10*6/mm3      Hemoglobin 8.0 g/dL      Hematocrit 25.3 %      MCV 94.4 fL      MCH 29.9 pg      MCHC 31.6 g/dL      RDW 14.6 %      RDW-SD 48.9 fl      MPV 10.1 fL      Platelets 120 10*3/mm3       Neutrophil % 69.0 %      Lymphocyte % 20.5 %      Monocyte % 5.9 %      Eosinophil % 3.4 %      Basophil % 0.7 %      Immature Grans % 0.5 %      Neutrophils, Absolute 4.10 10*3/mm3      Lymphocytes, Absolute 1.22 10*3/mm3      Monocytes, Absolute 0.35 10*3/mm3      Eosinophils, Absolute 0.20 10*3/mm3      Basophils, Absolute 0.04 10*3/mm3      Immature Grans, Absolute 0.03 10*3/mm3      nRBC 0.0 /100 WBC     Comprehensive Metabolic Panel [[id number]]  (Abnormal) Collected: 08/20/23 0124    Specimen: Blood Updated: 08/20/23 0214     Glucose 366 mg/dL      BUN 49 mg/dL      Creatinine 4.89 mg/dL      Sodium 138 mmol/L      Potassium 3.6 mmol/L      Comment: Slight hemolysis detected by analyzer. Results may be affected.        Chloride 104 mmol/L      CO2 21.0 mmol/L      Calcium 7.1 mg/dL      Total Protein 4.7 g/dL      Albumin 2.6 g/dL      ALT (SGPT) 9 U/L      AST (SGOT) 11 U/L      Alkaline Phosphatase 63 U/L      Total Bilirubin 0.2 mg/dL      Globulin 2.1 gm/dL      A/G Ratio 1.2 g/dL      BUN/Creatinine Ratio 10.0     Anion Gap 13.0 mmol/L      eGFR 9.3 mL/min/1.73      Comment: <15 Indicative of kidney failure       Narrative:      GFR Normal >60  Chronic Kidney Disease <60  Kidney Failure <15      POC Glucose Once [[id number]]  (Abnormal) Collected: 08/19/23 2023    Specimen: Blood Updated: 08/19/23 2033     Glucose 298 mg/dL             Imaging Results (Last 24 Hours)       ** No results found for the last 24 hours. **            Assessment and Plan:      1.  End-stage renal disease on peritoneal dialysis  2.  Type 2 diabetes with nephropathy  3.  Chronic diarrhea now worse  4.  New onset hypothyroidism  5.  Frailty  6.  Hypertension  7.  Malnutrition  8.  Diabetic neuropathy and myopathy with inability to ambulate independently  9.  Anemia of CKD    Will do dialysis with 1.5% bags  No ultrafiltration planned  Will have peritoneal dialysis nurse from the clinic check to see if we have an alternate person to  train otherwise we will have to switch her to maintenance hemodialysis  If diarrhea persists will obtain stool bio fire  Check iron profile    Also saw the patient on PD    Han Sandy MD  08/20/23  20:12 EDT

## 2023-08-21 NOTE — PLAN OF CARE
Goal Outcome Evaluation:              Outcome Evaluation: patient has rested well tonight. prn imodium given per request. stool sample collected. patient was assisted to bedside commode. patient is very weak to ambulate. diaysis remains indwelling.

## 2023-08-21 NOTE — THERAPY EVALUATION
Acute Care - Physical Therapy Treatment Note  [facility]     Patient Name: Reny Elena  : 1960  MRN: [id number]  Today's Date: 2023      Visit Dx:     ICD-10-CM ICD-9-CM   1. Chronic renal failure, unspecified CKD stage  N18.9 585.9   2. Self-care deficit  Z78.9 V49.89     Patient Active Problem List   Diagnosis    Tibia/fibula fracture    Iron deficiency anemia    Type 2 diabetes mellitus with hyperglycemia, with long-term current use of insulin    Wound of right ankle    Cellulitis    Metabolic encephalopathy    History of non-ST elevation myocardial infarction (NSTEMI)    CKD (chronic kidney disease) stage 3, GFR 30-59 ml/min    Elevated troponin    HTN (hypertension)    CVA (cerebral vascular accident)    Chronic diastolic CHF (congestive heart failure)    Decubitus ulcer of coccyx, unspecified pressure ulcer stage    Depression    Expressive aphasia    Impaired mobility and ADLs    Renal failure    Hyperkalemia    Subdural hematoma    Severe malnutrition    Cerebrovascular accident (CVA), unspecified mechanism    PRES (posterior reversible encephalopathy syndrome)     Past Medical History:   Diagnosis Date    Arthritis     Closed fracture of right fibula and tibia 2018    Depression     Diabetic ulcer of left foot associated with type 2 diabetes mellitus 2017    Diastolic dysfunction     Disease of thyroid gland     Elevated cholesterol     End stage renal disease     Essential hypertension     GERD (gastroesophageal reflux disease)     History of transfusion     Hyperlipidemia     Iron deficiency anemia 2018    PAD (peripheral artery disease)     Renal failure     Type 2 diabetes mellitus with hyperglycemia, with long-term current use of insulin 2018     Past Surgical History:   Procedure Laterality Date    ABDOMINAL SURGERY      BACK SURGERY      Post spinal diskectomy, osteophytectomy in one lumbar interspace    CATARACT EXTRACTION      Left      SECTION       DENTAL PROCEDURE      ENDOSCOPY      FRACTURE SURGERY      right leg    HYSTERECTOMY      INCISION AND DRAINAGE LEG Left 4/15/2017    Procedure: INCISION AND DRAINAGE LOWER EXTREMITY;  Surgeon: Basilio Morris MD;  Location: [facility] OR;  Service:     INCISION AND DRAINAGE LEG Left 5/26/2017    Procedure: Irrigation and Debridment abcess diabetic wound left foot with deep culture;  Surgeon: Basilio Morris MD;  Location: [facility] OR;  Service:     INSERTION PERITONEAL DIALYSIS CATHETER N/A 12/16/2021    Procedure: INSERTION PERITONEAL DIALYSIS CATHETER LAPAROSCOPIC;  Surgeon: Edy Glover MD;  Location: [facility] OR;  Service: General;  Laterality: N/A;    KNEE ARTHROSCOPY Right     ORIF TIBIA FRACTURE Right 12/28/2018    Procedure: TIBIAL  FRACTURE OPEN REDUCTION INTERNAL FIXATION;  Surgeon: Jung Barragan MD;  Location: [facility];  Service: Orthopedics    TOE AMPUTATION Right     5th    TUBAL ABDOMINAL LIGATION       PT Assessment (last 12 hours)       PT Evaluation and Treatment       Row Name 08/21/23 1508          Physical Therapy Time and Intention    Document Type evaluation  -KM     Mode of Treatment physical therapy  -KM     Patient Effort adequate  -KM     Symptoms Noted During/After Treatment other (see comments)  loss of balance  -KM       Row Name 08/21/23 1504          General Information    Patient Profile Reviewed yes  -KM     Patient Observations alert;cooperative;agree to therapy  -KM     Prior Level of Function independent:;all household mobility  pt. received assistance w/ ADLs from significant other as needed  -KM     Existing Precautions/Restrictions fall  -KM     Risks Reviewed patient:;LOB;nausea/vomiting;dizziness;increased discomfort  -KM     Benefits Reviewed patient:;improve function;increase independence;increase strength;increase balance  -KM     Barriers to Rehab previous functional deficit  -KM       Row Name 08/21/23 2390          Living Environment    Current Living Arrangements home   -KM     People in Home significant other  -KM     Primary Care Provided by self;spouse/significant other  -       Row Name 08/21/23 1509          Home Use of Assistive/Adaptive Equipment    Equipment Currently Used at Home rollator;wheelchair;commode  -       Row Name 08/21/23 1509          Cognition    Affect/Mental Status (Cognition) [facility]  -     Orientation Status (Cognition) oriented x 3  -KM     Follows Commands (Cognition) [facility]  -       Row Name 08/21/23 1509          Range of Motion (ROM)    Range of Motion bilateral lower extremities;ROM is [facility] Name 08/21/23 1509          Strength (Manual Muscle Testing)    Strength (Manual Muscle Testing) bilateral lower extremities;strength is [facility] Name 08/21/23 1509          Bed Mobility    Bed Mobility bed mobility (all) activities  -     All Activities, [place] (Bed Mobility) standby assist  -     Assistive Device (Bed Mobility) bed rails;head of bed elevated  -       Row Name 08/21/23 1509          Transfers    Transfers sit-stand transfer;stand-sit transfer  -       Row Name 08/21/23 1509          Sit-Stand Transfer    Sit-Stand [place] (Transfers) minimum assist (75% patient effort);moderate assist (50% patient effort)  -       Row Name 08/21/23 1509          Stand-Sit Transfer    Stand-Sit [place] (Transfers) contact guard;minimum assist (75% patient effort)  -[facility] Name 08/21/23 1509          Gait/Stairs (Locomotion)    Comment, (Gait/Stairs) pt. attempted ambulating but lost balance upon taking steps; pt. marched in place w/ difficulty maintaining balance  -       Row Name 08/21/23 1509          Safety Issues, Functional Mobility    Impairments Affecting Function (Mobility) balance;endurance/activity tolerance  -[facility] Name 08/21/23 1509          Balance    Balance Assessment sitting static balance;standing dynamic balance  -     Static Sitting Balance supervision  -     Position,  Sitting Balance sitting edge of bed  -KM     Dynamic Standing Balance moderate assist  -KM       Row Name             [REMOVED] Wound 12/16/21 0850 abdomen Incision    Wound - Properties Group Placement Date: 12/16/21 -KH Placement Time: 0850  -KH Location: abdomen  -KH Primary Wound Type: Incision  -KH, lap sites x 2  Removal Date: 08/21/23  -LB Removal Time: 0832  -LB    Retired Wound - Properties Group Placement Date: 12/16/21 -KH Placement Time: 0850  -KH Location: abdomen  -KH Primary Wound Type: Incision  -KH, lap sites x 2  Removal Date: 08/21/23  -LB Removal Time: 0832  -LB    Retired Wound - Properties Group Date first assessed: 12/16/21 -KH Time first assessed: 0850 -KH Location: abdomen  -KH Primary Wound Type: Incision  -KH, lap sites x 2  Resolution Date: 08/21/23  -LB Resolution Time: 0832  -LB      Row Name 08/21/23 1508          Plan of Care Review    Plan of Care Reviewed With patient  -KM     Outcome Evaluation Pt. evaluation completed during PT session. She was able to perform bed mobility w/ SBA. She was able to perform sit-stand-sit w/ Yusef-modA. She was unable to ambulate at time of evaluation d/t impaired balance. Pt. would benefit from skilled PT services.  -KM       Row Name 08/21/23 150          Therapy Assessment/Plan (PT)    Patient/Family Therapy Goals Statement (PT) return to PLOF  -KM     Functional Level at Time of Evaluation (PT) modA  -KM     PT Diagnosis (PT) decreased mobility  -KM     Rehab Potential (PT) good, to achieve stated therapy goals  -KM     Criteria for Skilled Interventions Met (PT) yes;skilled treatment is necessary  -KM     Therapy Frequency (PT) 2 times/wk  2-5x/wk  -KM     Predicted Duration of Therapy Intervention (PT) until discharge  -KM     Problem List (PT) problems related to;balance;mobility;strength  -KM     Activity Limitations Related to Problem List (PT) unable to ambulate safely;unable to transfer safely  -KM       Row Name 08/21/23 2268           Therapy Plan Review/Discharge Plan (PT)    Therapy Plan Review (PT) evaluation/treatment results reviewed;care plan/treatment goals reviewed;risks/benefits reviewed;patient  -KM       Row Name 08/21/23 1509          Physical Therapy Goals    Bed Mobility Goal Selection (PT) bed mobility, PT goal 1  -KM     Transfer Goal Selection (PT) transfer, PT goal 1  -KM     Gait Training Goal Selection (PT) gait training, PT goal 1  -KM       Row Name 08/21/23 1509          Bed Mobility Goal 1 (PT)    Activity/Assistive Device (Bed Mobility Goal 1, PT) bed mobility activities, all  -KM     [place] Level/Cues Needed (Bed Mobility Goal 1, PT) modified independence  -KM     Time Frame (Bed Mobility Goal 1, PT) by discharge  -KM       Row Name 08/21/23 1509          Transfer Goal 1 (PT)    Activity/Assistive Device (Transfer Goal 1, PT) transfers, all;walker, rolling  -KM     [place] Level/Cues Needed (Transfer Goal 1, PT) standby assist  -KM     Time Frame (Transfer Goal 1, PT) by discharge  -KM       Row Name 08/21/23 1509          Gait Training Goal 1 (PT)    Activity/Assistive Device (Gait Training Goal 1, PT) gait (walking locomotion);assistive device use;walker, rolling  -KM     [place] Level (Gait Training Goal 1, PT) contact guard required  -KM     Distance (Gait Training Goal 1, PT) 20'  -KM     Time Frame (Gait Training Goal 1, PT) by discharge  -               User Key  (r) = Recorded By, (t) = Taken By, (c) = Cosigned By      Initials Name Provider Type    Adriana Leslie, RN Registered Nurse    Dewayne Heard, MARIA C Physical Therapist    Elizabeth Hawkins, RN Registered Nurse                    Physical Therapy Education       Title: PT OT SLP Therapies (Done)       Topic: Physical Therapy (Done)       Point: Mobility training (Done)       Learning Progress Summary             Patient Acceptance, E,TB, VU by SHAYNE at 8/21/2023 1527    Acceptance, E, VU by SARAH at 8/19/2023 1810                          Point: Home exercise program (Done)       Learning Progress Summary             Patient Acceptance, E,TB, VU by KM at 8/21/2023 1527    Acceptance, E, VU by KJ at 8/19/2023 1810                         Point: Body mechanics (Done)       Learning Progress Summary             Patient Acceptance, E,TB, VU by KM at 8/21/2023 1527    Acceptance, E, VU by KJ at 8/19/2023 1810                         Point: Precautions (Done)       Learning Progress Summary             Patient Acceptance, E,TB, VU by KM at 8/21/2023 1527    Acceptance, E, VU by KJ at 8/19/2023 1810                                         User Key       Initials Effective Dates Name Provider Type Discipline     05/24/22 -  Dewayne Escamilla PT Physical Therapist PT     06/08/23 -  Alice Lagunas RN Registered Nurse Nurse                  PT Recommendation and Plan  Anticipated Discharge Disposition (PT): other (see comments) (TBD based on pt. progress)  Planned Therapy Interventions (PT): balance training, bed mobility training, gait training, home exercise program, patient/family education, postural re-education, ROM (range of motion), strengthening, stretching, transfer training  Therapy Frequency (PT): 2 times/wk (2-5x/wk)  Plan of Care Reviewed With: patient  Outcome Evaluation: Pt. evaluation completed during PT session. She was able to perform bed mobility w/ SBA. She was able to perform sit-stand-sit w/ Yusef-modA. She was unable to ambulate at time of evaluation d/t impaired balance. Pt. would benefit from skilled PT services.       Time Calculation:    PT Charges       Row Name 08/21/23 1509             Time Calculation    PT Received On 08/21/23  -      PT Goal Re-Cert Due Date 09/04/23  -                User Key  (r) = Recorded By, (t) = Taken By, (c) = Cosigned By      Initials Name Provider Type    Dewayne Heard PT Physical Therapist                  Therapy Charges for Today       Code Description Service Date Service Provider  Modifiers Qty    [id number] HC PT EVAL MOD COMPLEXITY 4 8/21/2023 Dewayne Escamilla, PT GP 1            PT G-Codes  AM-PAC 6 Clicks Score (PT): 17    Dewayne Escamilla, PT  8/21/2023

## 2023-08-21 NOTE — THERAPY EVALUATION
Patient Name: Reny Elena  : 1960    MRN: [id number]                              Today's Date: 2023       Admit Date: 2023    Visit Dx:     ICD-10-CM ICD-9-CM   1. Chronic renal failure, unspecified CKD stage  N18.9 585.9   2. Self-care deficit  Z78.9 V49.89     Patient Active Problem List   Diagnosis    Tibia/fibula fracture    Iron deficiency anemia    Type 2 diabetes mellitus with hyperglycemia, with long-term current use of insulin    Wound of right ankle    Cellulitis    Metabolic encephalopathy    History of non-ST elevation myocardial infarction (NSTEMI)    CKD (chronic kidney disease) stage 3, GFR 30-59 ml/min    Elevated troponin    HTN (hypertension)    CVA (cerebral vascular accident)    Chronic diastolic CHF (congestive heart failure)    Decubitus ulcer of coccyx, unspecified pressure ulcer stage    Depression    Expressive aphasia    Impaired mobility and ADLs    Renal failure    Hyperkalemia    Subdural hematoma    Severe malnutrition    Cerebrovascular accident (CVA), unspecified mechanism    PRES (posterior reversible encephalopathy syndrome)     Past Medical History:   Diagnosis Date    Arthritis     Closed fracture of right fibula and tibia 2018    Depression     Diabetic ulcer of left foot associated with type 2 diabetes mellitus 2017    Diastolic dysfunction     Disease of thyroid gland     Elevated cholesterol     End stage renal disease     Essential hypertension     GERD (gastroesophageal reflux disease)     History of transfusion     Hyperlipidemia     Iron deficiency anemia 2018    PAD (peripheral artery disease)     Renal failure     Type 2 diabetes mellitus with hyperglycemia, with long-term current use of insulin 2018     Past Surgical History:   Procedure Laterality Date    ABDOMINAL SURGERY      BACK SURGERY      Post spinal diskectomy, osteophytectomy in one lumbar interspace    CATARACT EXTRACTION      Left      SECTION      DENTAL  PROCEDURE      ENDOSCOPY      FRACTURE SURGERY      right leg    HYSTERECTOMY      INCISION AND DRAINAGE LEG Left 4/15/2017    Procedure: INCISION AND DRAINAGE LOWER EXTREMITY;  Surgeon: Basilio Morris MD;  Location: [facility] OR;  Service:     INCISION AND DRAINAGE LEG Left 5/26/2017    Procedure: Irrigation and Debridment abcess diabetic wound left foot with deep culture;  Surgeon: Basilio Morris MD;  Location: [facility] OR;  Service:     INSERTION PERITONEAL DIALYSIS CATHETER N/A 12/16/2021    Procedure: INSERTION PERITONEAL DIALYSIS CATHETER LAPAROSCOPIC;  Surgeon: Edy Glover MD;  Location: [facility] OR;  Service: General;  Laterality: N/A;    KNEE ARTHROSCOPY Right     ORIF TIBIA FRACTURE Right 12/28/2018    Procedure: TIBIAL  FRACTURE OPEN REDUCTION INTERNAL FIXATION;  Surgeon: Jung Barragan MD;  Location: [facility];  Service: Orthopedics    TOE AMPUTATION Right     5th    TUBAL ABDOMINAL LIGATION        General Information       Row Name 08/21/23 1528          OT Time and Intention    Document Type evaluation  -     Mode of Treatment individual therapy;occupational therapy  -       Row Name 08/21/23 1528          General Information    Patient Profile Reviewed yes  -     Prior Level of Function --  assist as needed from significant other; wheelchair, rolling walker, shower chair, BSC; on dialysis  -     Existing Precautions/Restrictions fall  -     Barriers to Rehab previous functional deficit  -       Row Name 08/21/23 1528          Occupational Profile    Reason for Services/Referral (Occupational Profile) Patient was admitted to [facility] on 8/19/2023. She was referred for OT evaluation due to change in functional performance with ADLs, functional mobility, and/or transfers.  -       Row Name 08/21/23 1528          Living Environment    People in Home significant other  -       Row Name 08/21/23 1528          Cognition    Orientation Status (Cognition) oriented x 3  -        [facility] Name 08/21/23 1528          Safety Issues, Functional Mobility    Impairments Affecting Function (Mobility) balance;endurance/activity tolerance;strength  -               User Key  (r) = Recorded By, (t) = Taken By, (c) = Cosigned By      Initials Name Provider Type     Tammie Dugan, OT Occupational Therapist                     Mobility/ADL's       [facility] Name 08/21/23 1532          Bed Mobility    Bed Mobility bed mobility (all) activities  -     All Activities, [place] (Bed Mobility) standby assist  -     Assistive Device (Bed Mobility) bed rails;head of bed elevated  -[facility] Name 08/21/23 1532          Sit-Stand Transfer    Sit-Stand [place] (Transfers) minimum assist (75% patient effort);moderate assist (50% patient effort)  -[facility] Name 08/21/23 1532          Stand-Sit Transfer    Stand-Sit [place] (Transfers) contact guard;minimum assist (75% patient effort)  -KP       Row Name 08/21/23 1532          Activities of Daily Living    BADL Assessment/Intervention bathing;upper body dressing;lower body dressing;grooming;feeding;toileting  -KP       Row Name 08/21/23 1532          Bathing Assessment/Intervention    [place] Level (Bathing) bathing skills;moderate assist (50% patient effort)  -[facility] Name 08/21/23 1532          Upper Body Dressing Assessment/Training    [place] Level (Upper Body Dressing) upper body dressing skills;moderate assist (50% patient effort)  -[facility] Name 08/21/23 1532          Lower Body Dressing Assessment/Training    [place] Level (Lower Body Dressing) lower body dressing skills;moderate assist (50% patient effort);maximum assist (25% patient effort)  -KP       Row Name 08/21/23 1532          Grooming Assessment/Training    [place] Level (Grooming) grooming skills;moderate assist (50% patient effort)  -KP       Row Name 08/21/23 1532          Self-Feeding Assessment/Training    [place] Level (Feeding) feeding  skills;set up  -[facility] Name 08/21/23 1532          Toileting Assessment/Training    [place] Level (Toileting) toileting skills;moderate assist (50% patient effort)  -               User Key  (r) = Recorded By, (t) = Taken By, (c) = Cosigned By      Initials Name Provider Type    Tammie Ro OT Occupational Therapist                   Obj/Interventions       [facility] Name 08/21/23 1534          Sensory Assessment (Somatosensory)    Sensory Assessment (Somatosensory) UE sensation intact  -KP       Row Name 08/21/23 1534          Vision Assessment/Intervention    Visual Impairment/Limitations WFL  -[facility] Name 08/21/23 1534          Range of Motion Comprehensive    General Range of Motion bilateral upper extremity ROM WFL  -[facility] Name 08/21/23 1534          Strength Comprehensive (MMT)    Comment, General Manual Muscle Testing (MMT) Assessment 4/5 MMT in BUEs  -[facility] Name 08/21/23 1534          Motor Skills    Motor Skills coordination;functional endurance  -     Coordination WFL;bilateral;upper extremity  -     Functional Endurance fair  -KP       Row Name 08/21/23 1534          Balance    Balance Assessment sitting static balance;standing static balance  -     Static Sitting Balance supervision  -     Static Standing Balance minimal assist;moderate assist  -               User Key  (r) = Recorded By, (t) = Taken By, (c) = Cosigned By      Initials Name Provider Type    Tammie Ro OT Occupational Therapist                   Goals/Plan       [facility] Name 08/21/23 1536          Transfer Goal 1 (OT)    Activity/Assistive Device (Transfer Goal 1, OT) toilet  -     [place] Level/Cues Needed (Transfer Goal 1, OT) minimum assist (75% or more patient effort)  -     Time Frame (Transfer Goal 1, OT) by discharge  -[facility] Name 08/21/23 1536          Dressing Goal 1 (OT)    Activity/Device (Dressing Goal 1, OT) dressing skills, all  -KP     [place]/Cues Needed  (Dressing Goal 1, OT) minimum assist (75% or more patient effort)  -       Row Name 08/21/23 1532          Problem Specific Goal 1 (OT)    Problem Specific Goal 1 (OT) Patient will perform sustained activity X12 minutes to promote functional endurance/activity tolerance needed for daily routine.  -     Time Frame (Problem Specific Goal 1, OT) by discharge  -       Row Name 08/21/23 1534          Therapy Assessment/Plan (OT)    Planned Therapy Interventions (OT) activity tolerance training;BADL retraining;transfer/mobility retraining;strengthening exercise;occupation/activity based interventions;ROM/therapeutic exercise;patient/caregiver education/training  -               User Key  (r) = Recorded By, (t) = Taken By, (c) = Cosigned By      Initials Name Provider Type    Tammie Ro, KORINA Occupational Therapist                   Clinical Impression       Row Name 08/21/23 153          Pain Assessment    Pretreatment Pain Rating 0/10 - no pain  -     Posttreatment Pain Rating 0/10 - no pain  -       Row Name 08/21/23 1536          Plan of Care Review    Plan of Care Reviewed With patient  -     Progress no change  -     Outcome Evaluation Patient seen for OT evaluation. She presents with functional limitations including generalized weakness, impaired balance, and limited activity tolerance/functional endurance. She would benefit from skilled OT services to address functional limitations to promote highest level of independence and safety.  -       Row Name 08/21/23 4418          Therapy Assessment/Plan (OT)    Patient/Family Therapy Goal Statement (OT) be able to return home  -     Rehab Potential (OT) good, to achieve stated therapy goals  -     Criteria for Skilled Therapeutic Interventions Met (OT) yes;meets criteria;skilled treatment is necessary  -     Therapy Frequency (OT) 3 times/wk  3-5x/week as able and available to support functional progress  -       Row Name 08/21/23 8060           Therapy Plan Review/Discharge Plan (OT)    Anticipated Discharge Disposition (OT) --  to be determined  -       Row Name 08/21/23 6239          Positioning and Restraints    Pre-Treatment Position in bed  -KP     Post Treatment Position bed  -KP     In Bed fowlers;call light within reach;encouraged to call for assist;exit alarm on  -KP               User Key  (r) = Recorded By, (t) = Taken By, (c) = Cosigned By      Initials Name Provider Type    Tammie Ro, OT Occupational Therapist                   Outcome Measures       Row Name 08/21/23 4660          How much help from another person do you currently need...    Turning from your back to your side while in flat bed without using bedrails? 3  -LB     Moving from lying on back to sitting on the side of a flat bed without bedrails? 3  -LB     Moving to and from a bed to a chair (including a wheelchair)? 3  -LB     Standing up from a chair using your arms (e.g., wheelchair, bedside chair)? 3  -LB     Climbing 3-5 steps with a railing? 2  -LB     To walk in hospital room? 3  -LB     AM-PAC 6 Clicks Score (PT) 17  -LB     Highest level of mobility 5 --> Static standing  -LB               User Key  (r) = Recorded By, (t) = Taken By, (c) = Cosigned By      Initials Name Provider Type    Elizabeth Hawkins, RN Registered Nurse                      OT Recommendation and Plan  Planned Therapy Interventions (OT): activity tolerance training, BADL retraining, transfer/mobility retraining, strengthening exercise, occupation/activity based interventions, ROM/therapeutic exercise, patient/caregiver education/training  Therapy Frequency (OT): 3 times/wk (3-5x/week as able and available to support functional progress)  Plan of Care Review  Plan of Care Reviewed With: patient  Progress: no change  Outcome Evaluation: Patient seen for OT evaluation. She presents with functional limitations including generalized weakness, impaired balance, and limited activity  tolerance/functional endurance. She would benefit from skilled OT services to address functional limitations to promote highest level of independence and safety.     Time Calculation:         Time Calculation- OT       Row Name 08/21/23 1537             Time Calculation- OT    OT Received On 08/21/23  -                User Key  (r) = Recorded By, (t) = Taken By, (c) = Cosigned By      Initials Name Provider Type    Tammie Ro OT Occupational Therapist                  Therapy Charges for Today       Code Description Service Date Service Provider Modifiers Qty    05123170820  OT EVAL MOD COMPLEXITY 4 8/21/2023 Tammie Dugan OT GO 1                 Tammie Dugan OT  8/21/2023

## 2023-08-22 PROBLEM — R79.89 ELEVATED TROPONIN: Status: RESOLVED | Noted: 2019-04-17 | Resolved: 2023-08-22

## 2023-08-22 PROBLEM — R77.8 ELEVATED TROPONIN: Status: RESOLVED | Noted: 2019-04-17 | Resolved: 2023-08-22

## 2023-08-22 PROBLEM — N18.30 CKD (CHRONIC KIDNEY DISEASE) STAGE 3, GFR 30-59 ML/MIN: Status: RESOLVED | Noted: 2019-03-25 | Resolved: 2023-08-22

## 2023-08-22 PROBLEM — N19 RENAL FAILURE: Status: RESOLVED | Noted: 2021-11-05 | Resolved: 2023-08-22

## 2023-08-22 LAB
ANION GAP SERPL CALCULATED.3IONS-SCNC: 12.8 MMOL/L (ref 5–15)
BUN SERPL-MCNC: 41 MG/DL (ref 8–23)
BUN/CREAT SERPL: 8.2 (ref 7–25)
CALCIUM SPEC-SCNC: 6.8 MG/DL (ref 8.6–10.5)
CHLORIDE SERPL-SCNC: 97 MMOL/L (ref 98–107)
CO2 SERPL-SCNC: 24.2 MMOL/L (ref 22–29)
CREAT SERPL-MCNC: 4.97 MG/DL (ref 0.57–1)
EGFRCR SERPLBLD CKD-EPI 2021: 9.1 ML/MIN/1.73
GLUCOSE BLDC GLUCOMTR-MCNC: 183 MG/DL (ref 70–130)
GLUCOSE BLDC GLUCOMTR-MCNC: 190 MG/DL (ref 70–130)
GLUCOSE BLDC GLUCOMTR-MCNC: 209 MG/DL (ref 70–130)
GLUCOSE BLDC GLUCOMTR-MCNC: 227 MG/DL (ref 70–130)
GLUCOSE SERPL-MCNC: 228 MG/DL (ref 65–99)
POTASSIUM SERPL-SCNC: 3.7 MMOL/L (ref 3.5–5.2)
SODIUM SERPL-SCNC: 134 MMOL/L (ref 136–145)

## 2023-08-22 PROCEDURE — 82948 REAGENT STRIP/BLOOD GLUCOSE: CPT

## 2023-08-22 PROCEDURE — 63710000001 INSULIN LISPRO (HUMAN) PER 5 UNITS: Performed by: HOSPITALIST

## 2023-08-22 PROCEDURE — 97530 THERAPEUTIC ACTIVITIES: CPT

## 2023-08-22 PROCEDURE — 63710000001 INSULIN GLARGINE PER 5 UNITS: Performed by: HOSPITALIST

## 2023-08-22 PROCEDURE — 80048 BASIC METABOLIC PNL TOTAL CA: CPT | Performed by: INTERNAL MEDICINE

## 2023-08-22 PROCEDURE — 25010000002 HEPARIN (PORCINE) PER 1000 UNITS: Performed by: HOSPITALIST

## 2023-08-22 PROCEDURE — 97116 GAIT TRAINING THERAPY: CPT

## 2023-08-22 PROCEDURE — 99232 SBSQ HOSP IP/OBS MODERATE 35: CPT | Performed by: INTERNAL MEDICINE

## 2023-08-22 RX ORDER — ISOSORBIDE MONONITRATE 30 MG/1
30 TABLET, EXTENDED RELEASE ORAL EVERY MORNING
Start: 2023-08-22 | End: 2023-09-07 | Stop reason: HOSPADM

## 2023-08-22 RX ADMIN — ROPINIROLE HYDROCHLORIDE 0.5 MG: 0.25 TABLET, FILM COATED ORAL at 08:51

## 2023-08-22 RX ADMIN — ASPIRIN 81 MG: 81 TABLET, COATED ORAL at 08:52

## 2023-08-22 RX ADMIN — INSULIN LISPRO 3 UNITS: 100 INJECTION, SOLUTION INTRAVENOUS; SUBCUTANEOUS at 08:51

## 2023-08-22 RX ADMIN — INSULIN GLARGINE 6 UNITS: 100 INJECTION, SOLUTION SUBCUTANEOUS at 21:29

## 2023-08-22 RX ADMIN — HEPARIN SODIUM 5000 UNITS: 5000 INJECTION INTRAVENOUS; SUBCUTANEOUS at 21:28

## 2023-08-22 RX ADMIN — ISOSORBIDE MONONITRATE 30 MG: 30 TABLET, EXTENDED RELEASE ORAL at 05:33

## 2023-08-22 RX ADMIN — INSULIN LISPRO 2 UNITS: 100 INJECTION, SOLUTION INTRAVENOUS; SUBCUTANEOUS at 18:07

## 2023-08-22 RX ADMIN — Medication 1 TABLET: at 08:52

## 2023-08-22 RX ADMIN — Medication 10 ML: at 08:53

## 2023-08-22 RX ADMIN — TIZANIDINE 4 MG: 4 TABLET ORAL at 21:35

## 2023-08-22 RX ADMIN — FLUOXETINE HYDROCHLORIDE 40 MG: 20 CAPSULE ORAL at 05:32

## 2023-08-22 RX ADMIN — ROPINIROLE HYDROCHLORIDE 0.5 MG: 0.25 TABLET, FILM COATED ORAL at 21:28

## 2023-08-22 RX ADMIN — INSULIN LISPRO 4 UNITS: 100 INJECTION, SOLUTION INTRAVENOUS; SUBCUTANEOUS at 21:28

## 2023-08-22 RX ADMIN — LOPERAMIDE HYDROCHLORIDE 2 MG: 2 CAPSULE ORAL at 15:46

## 2023-08-22 RX ADMIN — HYDROCODONE BITARTRATE AND ACETAMINOPHEN 1 TABLET: 10; 325 TABLET ORAL at 10:25

## 2023-08-22 RX ADMIN — DOCUSATE SODIUM 50 MG AND SENNOSIDES 8.6 MG 2 TABLET: 8.6; 5 TABLET, FILM COATED ORAL at 08:52

## 2023-08-22 RX ADMIN — TRAZODONE HYDROCHLORIDE 50 MG: 50 TABLET ORAL at 21:28

## 2023-08-22 RX ADMIN — PANTOPRAZOLE SODIUM 40 MG: 40 TABLET, DELAYED RELEASE ORAL at 08:52

## 2023-08-22 RX ADMIN — LEVOTHYROXINE SODIUM 25 MCG: 25 TABLET ORAL at 08:52

## 2023-08-22 RX ADMIN — HEPARIN SODIUM 5000 UNITS: 5000 INJECTION INTRAVENOUS; SUBCUTANEOUS at 08:52

## 2023-08-22 RX ADMIN — INSULIN LISPRO 2 UNITS: 100 INJECTION, SOLUTION INTRAVENOUS; SUBCUTANEOUS at 12:39

## 2023-08-22 RX ADMIN — HYDROCODONE BITARTRATE AND ACETAMINOPHEN 1 TABLET: 10; 325 TABLET ORAL at 21:28

## 2023-08-22 RX ADMIN — ATORVASTATIN CALCIUM 80 MG: 40 TABLET, FILM COATED ORAL at 21:28

## 2023-08-22 NOTE — PLAN OF CARE
Goal Outcome Evaluation:   Patient's discharge was discontinued. Patient is currently resting in bed. Inpatient rehab consult placed. No S&S of distress during shift. Will continue with plan of care.       Patient has no IV access, DO Tierra aware.

## 2023-08-22 NOTE — THERAPY TREATMENT NOTE
Acute Care - Physical Therapy Treatment Note  [facility]     Patient Name: Reny Elena  : 1960  MRN: [id number]  Today's Date: 2023      Visit Dx:     ICD-10-CM ICD-9-CM   1. Chronic renal failure, unspecified CKD stage  N18.9 585.9   2. Self-care deficit  Z78.9 V49.89     Patient Active Problem List   Diagnosis    Tibia/fibula fracture    Iron deficiency anemia    Type 2 diabetes mellitus with hyperglycemia, with long-term current use of insulin    Wound of right ankle    Cellulitis    Metabolic encephalopathy    History of non-ST elevation myocardial infarction (NSTEMI)    HTN (hypertension)    CVA (cerebral vascular accident)    Chronic diastolic CHF (congestive heart failure)    Decubitus ulcer of coccyx, unspecified pressure ulcer stage    Depression    Expressive aphasia    Impaired mobility and ADLs    Hyperkalemia    Subdural hematoma    Severe malnutrition    Cerebrovascular accident (CVA), unspecified mechanism    PRES (posterior reversible encephalopathy syndrome)     Past Medical History:   Diagnosis Date    Arthritis     Closed fracture of right fibula and tibia 2018    Depression     Diabetic ulcer of left foot associated with type 2 diabetes mellitus 2017    Diastolic dysfunction     Disease of thyroid gland     Elevated cholesterol     End stage renal disease     Essential hypertension     GERD (gastroesophageal reflux disease)     History of transfusion     Hyperlipidemia     Iron deficiency anemia 2018    PAD (peripheral artery disease)     Renal failure     Type 2 diabetes mellitus with hyperglycemia, with long-term current use of insulin 2018     Past Surgical History:   Procedure Laterality Date    ABDOMINAL SURGERY      BACK SURGERY      Post spinal diskectomy, osteophytectomy in one lumbar interspace    CATARACT EXTRACTION      Left      SECTION      DENTAL PROCEDURE      ENDOSCOPY      FRACTURE SURGERY      right leg    HYSTERECTOMY      INCISION  AND DRAINAGE LEG Left 4/15/2017    Procedure: INCISION AND DRAINAGE LOWER EXTREMITY;  Surgeon: Basilio Morris MD;  Location:  [facility] OR;  Service:     INCISION AND DRAINAGE LEG Left 5/26/2017    Procedure: Irrigation and Debridment abcess diabetic wound left foot with deep culture;  Surgeon: Basilio Morris MD;  Location:  [facility] OR;  Service:     INSERTION PERITONEAL DIALYSIS CATHETER N/A 12/16/2021    Procedure: INSERTION PERITONEAL DIALYSIS CATHETER LAPAROSCOPIC;  Surgeon: Edy Glover MD;  Location: [facility] OR;  Service: General;  Laterality: N/A;    KNEE ARTHROSCOPY Right     ORIF TIBIA FRACTURE Right 12/28/2018    Procedure: TIBIAL  FRACTURE OPEN REDUCTION INTERNAL FIXATION;  Surgeon: Jung Barragan MD;  Location: [facility] OR;  Service: Orthopedics    TOE AMPUTATION Right     5th    TUBAL ABDOMINAL LIGATION       PT Assessment (last 12 hours)       PT Evaluation and Treatment    No documentation.                   Physical Therapy Education       Title: PT OT SLP Therapies (Resolved)       Topic: Physical Therapy (Resolved)       Point: Mobility training (Resolved)       Learning Progress Summary             Patient Acceptance, E,TB, VU by KM at 8/21/2023 1527    Acceptance, E, VU by KJ at 8/19/2023 1810                         Point: Home exercise program (Resolved)       Learning Progress Summary             Patient Acceptance, E,TB, VU by KM at 8/21/2023 1527    Acceptance, E, VU by KJ at 8/19/2023 1810                         Point: Body mechanics (Resolved)       Learning Progress Summary             Patient Acceptance, E,TB, VU by KM at 8/21/2023 1527    Acceptance, E, VU by KJ at 8/19/2023 1810                         Point: Precautions (Resolved)       Learning Progress Summary             Patient Acceptance, E,TB, VU by KM at 8/21/2023 1527    Acceptance, E, VU by KJ at 8/19/2023 1810                                         User Key       Initials Effective Dates Name Provider Type Discipline    KM  05/24/22 -  Dewayne Escamilla, PT Physical Therapist PT    KJ 06/08/23 -  Alice Lagunas, RN Registered Nurse Nurse                  PT Recommendation and Plan  Anticipated Discharge Disposition (PT): other (see comments) (TBD based on pt. progress)  Planned Therapy Interventions (PT): balance training, bed mobility training, gait training, home exercise program, patient/family education, postural re-education, ROM (range of motion), strengthening, stretching, transfer training  Therapy Frequency (PT): 2 times/wk (2-5x/wk)  Progress Summary (PT)  Daily Progress Summary (PT): Pt. demonstrated improved functional mobility skills during PT session. She was able to perform ambulation in room w/ RW. Pt. would continue to benefit from skilled PT services.  Plan of Care Reviewed With: patient  Outcome Evaluation: Pt. evaluation completed during PT session. She was able to perform bed mobility w/ SBA. She was able to perform sit-stand-sit w/ Yusef-modA. She was unable to ambulate at time of evaluation d/t impaired balance. Pt. would benefit from skilled PT services.       Time Calculation:       Therapy Charges for Today       Code Description Service Date Service Provider Modifiers Qty    50018332021  PT THERAPEUTIC ACT EA 15 MIN 8/22/2023 Dewayne Escamilla, PT GP 1    75624403626 HC GAIT TRAINING EA 15 MIN 8/22/2023 Dewayne Escamilla, PT GP 1            PT G-Codes  AM-PAC 6 Clicks Score (PT): 17    Dewayne Escamilla PT  8/23/2023

## 2023-08-22 NOTE — PLAN OF CARE
Problem: Adult Inpatient Plan of Care  Goal: Absence of Hospital-Acquired Illness or Injury  Intervention: Prevent Skin Injury  Recent Flowsheet Documentation  Taken 8/21/2023 1920 by Clover Kaye RN  Body Position: position changed independently  Skin Protection:   adhesive use limited   incontinence pads utilized     Problem: Fall Injury Risk  Goal: Absence of Fall and Fall-Related Injury  Intervention: Identify and Manage Contributors  Recent Flowsheet Documentation  Taken 8/21/2023 1920 by Clover Kaye RN  Medication Review/Management: medications reviewed   Goal Outcome Evaluation:

## 2023-08-22 NOTE — PLAN OF CARE
Goal Outcome Evaluation:   Patient to be DC on this date. IV and tele box removed.

## 2023-08-22 NOTE — PROGRESS NOTES
[facility] HOSPITALIST PROGRESS NOTE     Patient Identification:  Name:  Reny Elena  Age:  63 y.o.  Sex:  female  :  1960  MRN:  [id number]  Visit Number:  [id number]  Primary Care Provider:  Susan Stephens APRN    Length of stay:  3    Chief complaint: None    Subjective:    Patient seen and examined at bedside with patient's daughter present.  Patient reports she is feeling well today and has no complaints.  Patient was to be discharged home today but unfortunately no family is available to help take care of patient at home at this time.  Did have lengthy discussion regarding potential discharge disposition.  Initially, patient and her family were requesting placement in a skilled nursing facility/nursing home.  Unfortunately, patient is not a candidate for skilled nursing facility placement as she has a peritoneal dialysis patient.  Did discuss potentially transitioning to hemodialysis.  However, patient does not want to transition to hemodialysis permanently.  As such, it is not felt in the patient's best interest to transition to hemodialysis for 1 to 2 weeks simply to have patient placed in skilled nursing facility.  As such, we will work closely with case management to search for an alternative option.  ----------------------------------------------------------------------------------------------------------------------  Current Hospital Meds:  amLODIPine, 10 mg, Oral, Daily  aspirin, 81 mg, Oral, Daily  atorvastatin, 80 mg, Oral, Nightly  FLUoxetine, 40 mg, Oral, QAM  gabapentin, 300 mg, Oral, Daily  heparin (porcine), 5,000 Units, Subcutaneous, Q12H  insulin glargine, 6 Units, Subcutaneous, Nightly  insulin lispro, 2-7 Units, Subcutaneous, 4x Daily AC & at Bedtime  isosorbide mononitrate, 30 mg, Oral, QAM  levothyroxine, 25 mcg, Oral, Daily  metoprolol succinate XL, 25 mg, Oral, Daily  multivitamin, 1 tablet, Oral, Daily  pantoprazole, 40 mg, Oral, Daily  rOPINIRole, 0.5 mg,  Oral, BID  senna-docusate sodium, 2 tablet, Oral, BID  sodium chloride, 10 mL, Intravenous, Q12H  traZODone, 50 mg, Oral, Nightly         ----------------------------------------------------------------------------------------------------------------------  Vital Signs:  Temp:  [97.9 °F (36.6 °C)-98.7 °F (37.1 °C)] 98.7 °F (37.1 °C)  Heart Rate:  [61-72] 64  Resp:  [16] 16  BP: ()/(53-63) 93/53      08/19/23  1718 08/20/23  0500 08/22/23  0500   Weight: 56 kg (123 lb 6.4 oz) 56 kg (123 lb 6.4 oz) (new admit) 56.8 kg (125 lb 4.8 oz)     Body mass index is 21.51 kg/m².    Intake/Output Summary (Last 24 hours) at 8/22/2023 1433  Last data filed at 8/22/2023 1400  Gross per 24 hour   Intake 1460 ml   Output 627 ml   Net 833 ml       Diet: Regular/House Diet; Texture: Regular Texture (IDDSI 7); Fluid Consistency: Thin (IDDSI 0)  ----------------------------------------------------------------------------------------------------------------------  Physical exam:  Constitutional: Chronically ill-appearing  female in no apparent distress.     HENT:  Head:  Normocephalic and atraumatic.  Mouth:  Moist mucous membranes.    Eyes:  Conjunctivae and EOM are normal.  Pupils are equal, round, and reactive to light.  No scleral icterus.    Neck:  Neck supple. No thyromegaly.  No JVD present.    Cardiovascular:  Regular rate and rhythm with no murmurs, rubs, clicks or gallops appreciated.  Pulmonary/Chest:  Clear to auscultation bilaterally with no crackles, wheezes or rhonchi appreciated.  Abdominal:  Soft. Nontender. Nondistended  Bowel sounds are normal in all four quadrants. No organomegally appreciated.   Musculoskeletal:  No edema, no tenderness, and no deformity.  No red or swollen joints anywhere.    Neurological:  Alert, Cranial nerves II-XII intact with no focal deficits.  No facial droop.  No slurred speech.   Skin:  Warm and dry to palpation with no rashes or lesions appreciated.  Peripheral vascular:  2+  radial and pedal pulses in bilateral upper and lower extremities.  Psychiatric:  Alert and oriented x3, demonstrates appropriate judgment and insight.    No significant change in physical exam in comparison to 8/21/2023  -------------------------------------------------------------------------------------------------  ----------------------------------------------------------------------------------------------------------------------  Results from last 7 days   Lab Units 08/19/23  1414 08/19/23  1109   HSTROP T ng/L 150* 158*       Results from last 7 days   Lab Units 08/20/23  0124 08/19/23  1109   WBC 10*3/mm3 5.94 5.95   HEMOGLOBIN g/dL 8.0* 10.1*   HEMATOCRIT % 25.3* 31.3*   MCV fL 94.4 92.6   MCHC g/dL 31.6 32.3   PLATELETS 10*3/mm3 120* 106*   INR   --  0.85*           Results from last 7 days   Lab Units 08/22/23  0139 08/21/23  0138 08/20/23  0124 08/19/23  1109   SODIUM mmol/L 134* 137 138 138   POTASSIUM mmol/L 3.7 3.2* 3.6 4.0   MAGNESIUM mg/dL  --   --   --  2.2   CHLORIDE mmol/L 97* 100 104 102   CO2 mmol/L 24.2 23.8 21.0* 22.2   BUN mg/dL 41* 43* 49* 55*   CREATININE mg/dL 4.97* 4.89* 4.89* 5.19*   CALCIUM mg/dL 6.8* 7.2* 7.1* 7.4*   GLUCOSE mg/dL 228* 209* 366* 264*   ALBUMIN g/dL  --   --  2.6* 3.3*   BILIRUBIN mg/dL  --   --  0.2 0.3   ALK PHOS U/L  --   --  63 82   AST (SGOT) U/L  --   --  11 16   ALT (SGPT) U/L  --   --  9 13     Estimated Creatinine Clearance: 10.1 mL/min (A) (by C-G formula based on SCr of 4.97 mg/dL (H)).    No results found for: AMMONIA      No results found for: BLOODCX  No results found for: URINECX  No results found for: WOUNDCX  No results found for: STOOLCX    I have personally looked at the labs and they are summarized above.  ----------------------------------------------------------------------------------------------------------------------  Imaging Results (Last 24 Hours)       ** No results found for the last 24 hours. **           ----------------------------------------------------------------------------------------------------------------------  Assessment and Plan:    ESRD on PD -continue peritoneal dialysis while inpatient    2.  Essential hypertension -well-controlled    3.  Type 2 diabetes mellitus -continue Accu-Cheks before every meal and nightly with sliding scale insulin    4.  Multiple electrolyte abnormalities -resolved with resumption of peritoneal dialysis    5.  Newly diagnosed hypothyroidism -continue Synthroid 25 mcg p.o. daily    Disposition probable discharge tomorrow    Narayan Mayorga DO   08/22/23   14:33 EDT

## 2023-08-22 NOTE — CASE MANAGEMENT/SOCIAL WORK
Discharge Planning Assessment   Jose Alfredo     Patient Name: Reny Elena  MRN: [id number]  Today's Date: 8/22/2023    Admit Date: 8/19/2023    Plan: SS received a call from Jose Alfredo Sal who voiced concerns for pt returning home on this date. Jonelle states they had spoke with pt and pt is agreeable for short term rehab before returning home due to pt's significant other being admitted to the hospital at this time.  was informed that pt's significant other, Cliff is pt's primary care giver. SS received a call from RN who requested SS to speak with pt at bedside on this date to follow up on disposition. SS spoek with pt at bedside on this date who states she is agreeable for nursing home placement. SS informed pt that she is unable to go to SNF with Peritoneal dialysis. SS notified physician of pt's request. SS to follow.       Discharge Plan       Row Name 08/22/23 1038       Plan    Plan SS received a call from Jose Alfredo Sal who voiced concerns for pt returning home on this date. Jonelle states they had spoke with pt and pt is agreeable for short term rehab before returning home due to pt's significant other being admitted to the hospital at this time. SS was informed that pt's significant other, Cliff is pt's primary care giver. SS received a call from RN who requested SS to speak with pt at bedside on this date to follow up on disposition. SS spoek with pt at bedside on this date who states she is agreeable for nursing home placement. SS informed pt that she is unable to go to SNF with Peritoneal dialysis. SS notified physician of pt's request. SS to follow.    11:20-  received a call from [address] [place] [address] who states pt's daughter is at bedside and requesting to speak with SS at bedside.     12:00- SS spoke with pt and pt's daughter at bedside on this date. Pt's daughter voiced concerns about pt returning home due to pt not having 24/7 care at home. Pt's daughter states she spoke with Jose Alfredo YEE per  Jonelle about pt going to [facility] vs nursing home for short term rehab. Pt's daughter requested to speak with physician. SS notified physician.     12:15- SS contacted [facility] per Karrie who states they are not able to take peritoneal dialysis in [facility].     12:30- SS received a call from physician who states he spoke with pt and daughter at bedside on this date. SS informed physician that [facility], [facility], and [facility] is not able to take peritoneal dialysis pt's they only accept Hemodialysis patients. Pt's daughter states she does not want pt to get HD catheter placed.                  Nevin Elena, ADRIANW

## 2023-08-22 NOTE — DISCHARGE PLACEMENT REQUEST
"Reny Merino (63 y.o. Female)       Date of Birth   1960    Social Security Number       Address   [address] [place] [address]    Home Phone   [phone]    MRN   [id number]       Lutheran   Sikh    Marital Status                               Admission Date   8/19/23    Admission Type   Emergency    Admitting Provider       Attending Provider   Narayan Mayorga DO    Department, Room/Bed   [address], 3321/       Discharge Date       Discharge Disposition   Home or Self Care    Discharge Destination                                 Attending Provider: Narayan Mayorga DO    Allergies: Penicillins, Codeine, Sulfa Antibiotics    Isolation: None   Infection: None   Code Status: CPR    Ht: 162.6 cm (64\")   Wt: 56.8 kg (125 lb 4.8 oz)    Admission Cmt: None   Principal Problem: None                  Active Insurance as of 8/19/2023       Primary Coverage       Payor Plan Insurance Group Employer/Plan Group    MEDICARE MEDICARE B ONLY        Payor Plan Address Payor Plan Phone Number Payor Plan Fax Number Effective Dates    [address] [address] [phone]  3/1/2023 - None Entered    [facility] [id number]         Subscriber Name Subscriber Birth Date Member ID       RENY MERINO 1960 [id number]               Secondary Coverage       Payor Plan Insurance Group Employer/Plan Group    [place]        Payor Plan Address Payor Plan Phone Number Payor Plan Fax Number Effective Dates    [address] [phone]  11/7/2022 - None Entered    [facility] [id number]         Subscriber Name Subscriber Birth Date Member ID       RENY MERINO 1960 [id number]                     Emergency Contacts        (Rel.) Home Phone Work Phone Mobile Phone    Cliff Leiva (healthcare surrogate) (Significant Other) [phone] -- [phone]    Liza Oliva (Daughter) [phone] -- [phone]              Insurance Information  "                 MEDICARE/MEDICARE B ONLY Phone: [phone]    Subscriber: Reny Elena Subscriber#: [id number]    Group#: -- Precert#: --        [place] Phone: [phone]    Subscriber: Reny Elena Subscriber#: [id number]    Group#: -- Precert#: [id number]             History & Physical        Babatunde Emery PA-C at 23 1324       Attestation signed by Rebeka Woodruff MD at 23 6227 (Updated)    Patient seen and examined in independently from Babatunde Emery PA-C.  Assisted by Alice Lagunas RN.  Patient has chronic diarrhea but has worsened this past 3 days, she also missed her peritoneal dialysis for past 5 days now since her  who helps her with PD is apparently hospitalized.  She has become weak from diarrhea which has worsened, no abdominal pain no fever no recent antibiotic treatment.  She stated she fell 3 days ago sustaining abrasion on her right leg.  No signs of cellulitis.   Patient is very frail, malnourished.  Also noted significant elevation of TSH.    -Diarrhea chronic with worsening we will check stool panel   -Generalized weakness   -Diabetes with hyperglycemia   -Hypertension poorly controlled   -Malnutrition   -Severe diabetic neuropathy   -End-stage kidney disease on PD   -Elevated TSH   -Thrombocytopenia with no signs of coagulopathy  -Troponin elevation chronic in the setting of end-stage kidney disease    Check free T4, check GI panel, Accu-Chek and sliding scale, nephrology consult, Imodium as needed.  PT OT, fall precautions.  With regards to her platelet count could be related to hypothyroidism.  We will check fibrinogen level.                      [facility]  History & Physical    Patient Identification:  Name:  Reny Elena  Age:  63 y.o.  Sex:  female  :  1960  MRN:  [id number]   Visit Number:  [id number]  Admit Date: 2023   Primary Care Physician:  Angelo  DARIUS Davis    Subjective     Chief complaint: Hypertension    History of presenting illness:      Reny Elena is a 63 y.o. female who presented for further evaluation of hypertension and missed dialysis sessions. Patient reports her life partner who cares for her at home is currently admitted to the ICU. She states as such she has not had peritoneal dialysis in 5 days. She states over the past 5 days she has felt more progressively ill. She reports increased swelling of her abdomen and feeling generally weaker than baseline. It is of note that patient is nonambulatory at baseline, uses a wheelchair. She states this morning her neighbor who happens to be a dialysis technician came over to check on her and had concerns so called Dr. Sandy who recommended she come on in to the ED. She also notes her BP was elevated this AM, is generally well controlled at home. She has had some head aches. Petechial rash noted on exam to left forearm and patient states thrombocytopenia has been a chronic issue for her and she gets monthly injections to treat this. Unable to confirm on chart review. She reports compliance with home insulin and antihypertensive regimen.  She denies any chest pain or palpitations, no recent fever or chills. No SOB or cough. Some recent diarrhea though notes has some difficulty with this chronically. See full ROS below.     Past medical history is significant for ESRD on PD, HTN, HLD,hypothyroidism chronic HFpEF, iron deficiency anemia, IDDM 2, Hx CVA, PAD, GERD    Upon arrival to the ED, vital signs were temp 98.7, heart rate 67, respirations 18, /95, SPO2 99% on room air.  BP did trend as high as 208/99 in the ED though has since improved post hydralazine, 151/65 on last check.  Significant laboratory findings include glucose 264, phosphorus elevated at 6.4, albumin is low at 3.3,, calcium when corrected for hypoalbuminemia is low at 7.6.  CBC noted anemia around recent baseline.  Platelets  are low at 106.  EKG normal sinus rhythm, rate 66, prolonged QTc interval at 509    Known Emergency Department medications received prior to my evaluation included hydralazine 10 mg x 1.   Emergency Department Room location at the time of my evaluation was 405.     ---------------------------------------------------------------------------------------------------------------------   Review of Systems   Constitutional:  Negative for chills and fever.   HENT:  Negative for congestion and rhinorrhea.    Respiratory:  Negative for cough and shortness of breath.    Cardiovascular:  Negative for chest pain and palpitations.   Gastrointestinal:  Positive for abdominal distention. Negative for diarrhea, nausea and vomiting.   Musculoskeletal:  Negative for arthralgias and myalgias.   Skin:  Positive for rash. Negative for wound.   Neurological:  Negative for dizziness and light-headedness.      ---------------------------------------------------------------------------------------------------------------------   Past Medical History:   Diagnosis Date    Arthritis     Closed fracture of right fibula and tibia 2018    Depression     Diabetic ulcer of left foot associated with type 2 diabetes mellitus 2017    Diastolic dysfunction     Disease of thyroid gland     Elevated cholesterol     End stage renal disease     Essential hypertension     GERD (gastroesophageal reflux disease)     History of transfusion     Hyperlipidemia     Iron deficiency anemia 2018    PAD (peripheral artery disease)     Renal failure     Type 2 diabetes mellitus with hyperglycemia, with long-term current use of insulin 2018     Past Surgical History:   Procedure Laterality Date    ABDOMINAL SURGERY      BACK SURGERY      Post spinal diskectomy, osteophytectomy in one lumbar interspace    CATARACT EXTRACTION      Left      SECTION      DENTAL PROCEDURE      ENDOSCOPY      FRACTURE SURGERY      right leg    HYSTERECTOMY       INCISION AND DRAINAGE LEG Left 4/15/2017    Procedure: INCISION AND DRAINAGE LOWER EXTREMITY;  Surgeon: Basilio Morris MD;  Location: [facility] OR;  Service:     INCISION AND DRAINAGE LEG Left 5/26/2017    Procedure: Irrigation and Debridment abcess diabetic wound left foot with deep culture;  Surgeon: Basilio Morris MD;  Location: [facility] OR;  Service:     INSERTION PERITONEAL DIALYSIS CATHETER N/A 12/16/2021    Procedure: INSERTION PERITONEAL DIALYSIS CATHETER LAPAROSCOPIC;  Surgeon: Edy Glover MD;  Location: [facility] OR;  Service: General;  Laterality: N/A;    KNEE ARTHROSCOPY Right     ORIF TIBIA FRACTURE Right 12/28/2018    Procedure: TIBIAL  FRACTURE OPEN REDUCTION INTERNAL FIXATION;  Surgeon: Jung Barragan MD;  Location: [facility] OR;  Service: Orthopedics    TOE AMPUTATION Right     5th    TUBAL ABDOMINAL LIGATION       Family History   Problem Relation Age of Onset    Heart disease Mother     Cancer Mother     COPD Mother     Diabetes Other     Depression Other      Social History     Socioeconomic History    Marital status:    Tobacco Use    Smoking status: Never    Smokeless tobacco: Never   Vaping Use    Vaping Use: Never used   Substance and Sexual Activity    Alcohol use: No    Drug use: No    Sexual activity: Defer     ---------------------------------------------------------------------------------------------------------------------   Allergies:  Penicillins, Codeine, and Sulfa antibiotics  ---------------------------------------------------------------------------------------------------------------------   Home medications:    Medications below are reported home medications pulling from within the system; at this time, these medications have not been reconciled unless otherwise specified and are in the verification process for further verifcation as current home medications.  (Not in a hospital admission)      Hospital Scheduled Meds:    No current facility-administered medications for  this encounter.      Current listed hospital scheduled medications may not yet reflect those currently placed in orders that are signed and held awaiting patient's arrival to floor.   ---------------------------------------------------------------------------------------------------------------------     Objective     Vital Signs:  Temp:  [98.7 °F (37.1 °C)] 98.7 °F (37.1 °C)  Heart Rate:  [67-68] 68  Resp:  [18] 18  BP: (151-208)/(65-99) 151/65      08/19/23  1014   Weight: 52.6 kg (116 lb)     Body mass index is 146.16 kg/m².  ---------------------------------------------------------------------------------------------------------------------       Physical Exam  Vitals and nursing note reviewed.   Constitutional:       General: She is not in acute distress.     Appearance: She is ill-appearing (chronically ill appearing).   HENT:      Head: Normocephalic and atraumatic.   Eyes:      Extraocular Movements: Extraocular movements intact.      Conjunctiva/sclera: Conjunctivae normal.   Cardiovascular:      Rate and Rhythm: Normal rate and regular rhythm.      Heart sounds: Murmur heard.   Pulmonary:      Effort: Pulmonary effort is normal.      Breath sounds: Normal breath sounds.   Abdominal:      Palpations: Abdomen is soft.      Tenderness: There is no abdominal tenderness.      Comments: PD catheter RUQ   Musculoskeletal:      Right lower leg: No edema.      Left lower leg: No edema.   Skin:     General: Skin is warm and dry.      Comments: Petechial rash, primarily left forearm and hand, beneath BP cuff   Neurological:      Mental Status: She is alert. Mental status is at baseline.   Psychiatric:         Mood and Affect: Mood normal.         Behavior: Behavior normal.             ---------------------------------------------------------------------------------------------------------------------  EKG:        I have personally looked at the  EKG.  ---------------------------------------------------------------------------------------------------------------------   Results from last 7 days   Lab Units 08/19/23  1109   WBC 10*3/mm3 5.95   HEMOGLOBIN g/dL 10.1*   HEMATOCRIT % 31.3*   MCV fL 92.6   MCHC g/dL 32.3   PLATELETS 10*3/mm3 106*   INR  0.85*         Results from last 7 days   Lab Units 08/19/23  1109   SODIUM mmol/L 138   POTASSIUM mmol/L 4.0   MAGNESIUM mg/dL 2.2   CHLORIDE mmol/L 102   CO2 mmol/L 22.2   BUN mg/dL 55*   CREATININE mg/dL 5.19*   CALCIUM mg/dL 7.4*   GLUCOSE mg/dL 264*   ALBUMIN g/dL 3.3*   BILIRUBIN mg/dL 0.3   ALK PHOS U/L 82   AST (SGOT) U/L 16   ALT (SGPT) U/L 13   Estimated Creatinine Clearance: 4.4 mL/min (A) (by C-G formula based on SCr of 5.19 mg/dL (H)).  No results found for: AMMONIA          Lab Results   Component Value Date    HGBA1C 8.30 (H) 07/06/2023     Lab Results   Component Value Date    TSH 62.880 (H) 03/04/2023    FREET4 1.15 03/04/2023     No results found for: PREGTESTUR, PREGSERUM, HCG, HCGQUANT  Pain Management Panel  More data exists         Latest Ref Rng & Units 3/5/2023 5/11/2022   Pain Management Panel   Amphetamine, Urine Qual Negative Negative  Negative    Barbiturates Screen, Urine Negative Negative  Negative    Benzodiazepine Screen, Urine Negative Negative  Positive    Buprenorphine, Screen, Urine Negative Negative  Negative    Cocaine Screen, Urine Negative Negative  Negative    Methadone Screen , Urine Negative Negative  Negative    Methamphetamine, Ur Negative Negative  Negative      No results found for: BLOODCX  No results found for: URINECX  No results found for: WOUNDCX  No results found for: STOOLCX      ---------------------------------------------------------------------------------------------------------------------  Imaging Results (Last 7 Days)       Procedure Component Value Units Date/Time    XR Chest AP [[id number]] Resulted: 08/19/23 1327     Updated: 08/19/23 1327             Cultures:  No results found for: BLOODCX, URINECX, WOUNDCX, MRSACX, RESPCX, STOOLCX    Last echocardiogram:  Results for orders placed during the hospital encounter of 07/01/22    Adult Transthoracic Echo Complete W/ Cont if Necessary Per Protocol (With Agitated Saline)    Interpretation Summary  · Left ventricular ejection fraction appears to be 51 - 55%. Left ventricular systolic function is normal.  · Left ventricular diastolic function is consistent with (grade I) impaired relaxation.  · The aortic valve is abnormal in structure. The aortic valve exhibits sclerosis. There is mild calcification of the aortic valve. The aortic valve was poorly visualized but appears trileaflet. Trace aortic valve regurgitation is present. Mild aortic valve stenosis is present.  · Saline test results are negative.          I have personally reviewed the above radiology images and read the final radiology report on 08/19/23  ---------------------------------------------------------------------------------------------------------------------  Assessment / Plan     Active Hospital Problems    Diagnosis  POA    **Renal failure [N19]  Yes       ASSESSMENT/PLAN:    ESRD on PD with multiple missed sessions  Uncontrolled hypertension, POA  Elevated HS troponin, POA, likely predominantly due to #1  IDDM 2 presenting with hyperglycemia  Hypocalcemia  Hyperphosphatemia  Hypoalbuminemia  Thrombocytopenia, appearing to be chronic, intermittent   Patient presents to the ED reporting home BP has been elevated.  Also has ESRD and is on PD though has missed dialysis for the past 5 days as her caretaker is currently admitted to this ICU.  Work-up in the ED significant for calcium 7.6 when corrected for hypoalbuminemia which is at 3.3, also with hyperphosphatemia at 6.4.  Glucose is 264.  Platelets are decreased at 106.  Patient's BP as high as 208/99 though did improve to 151/65 at last check s/p hydralazine 10 mg x 1 in the ED.  She does  report headaches at home.  She will be admitted to telemetry for further management  We will consult nephrology for assistance in the management of ESRD while inpatient.  Assistance is appreciated.  Resume home lisinopril, Imdur.  Continue to monitor BP trend.  May restart home hydralazine as well if indicated, patient has had dose this AM as above.  CXR and UA ordered and pending, will follow up  We will also obtain an updated TTE. Does not appear volume overloaded on exam.  Monitor I's and O's  We will continue with home Lantus 6 units nightly.  Accu-Cheks to monitor with hypoglycemia protocol in place.  We will continue to adjust insulin regimen as needed while inpatient.  Continue to provide supportive care  Monitor platelet count closely. Treat as indicated.       Chronic:  HLD  Hypothyroidism  Chronic HFpEF  Iron deficiency anemia  Hx CVA  PAD  GERD  We will restart home medication regimen as indicated    ----------  -DVT prophylaxis: Subcu heparin  -Activity: Ad christin.  -Expected length of stay: INPATIENT status due to the need for care which can only be reasonably provided in an hospital setting such as aggressive/expedited ancillary services and/or consultation services, the necessity for IV medications, close physician monitoring and/or the possible need for procedures.  In such, I feel patient’s risk for adverse outcomes and need for care warrant INPATIENT evaluation and predict the patient’s care encounter to likely last beyond 2 midnights.   -Disposition pending course    High risk secondary to ESRD with missed sessions, uncontrolled HTN    There are no questions and answers to display.       Babatunde Emery PA-C   08/19/23  13:32 EDT     Electronically signed by Rebeka Woodruff MD at 08/19/23 7755       Current Facility-Administered Medications   Medication Dose Route Frequency Provider Last Rate Last Admin    amLODIPine (NORVASC) tablet 10 mg  10 mg Oral Daily Erwin Tatum MD   10 mg at  08/21/23 0827    aspirin EC tablet 81 mg  81 mg Oral Daily Rebeka Woodruff MD   81 mg at 08/22/23 0852    atorvastatin (LIPITOR) tablet 80 mg  80 mg Oral Nightly Rebeka Woodruff MD   80 mg at 08/21/23 2121    sennosides-docusate (PERICOLACE) 8.6-50 MG per tablet 2 tablet  2 tablet Oral BID Rebeka Woodruff MD   2 tablet at 08/22/23 0852    And    polyethylene glycol (MIRALAX) packet 17 g  17 g Oral Daily PRN Rebeka Woodruff MD        And    bisacodyl (DULCOLAX) EC tablet 5 mg  5 mg Oral Daily PRN Rebeka Woodruff MD        And    bisacodyl (DULCOLAX) suppository 10 mg  10 mg Rectal Daily PRN Rebeka Woodruff MD        cloNIDine (CATAPRES) tablet 0.1 mg  0.1 mg Oral TID PRN Han Sandy MD        dextrose (D50W) (25 g/50 mL) IV injection 25 g  25 g Intravenous Q15 Min PRN Rebeka Woodruff MD        dextrose (GLUTOSE) oral gel 15 g  15 g Oral Q15 Min PRN Rebeka Woodruff MD        FLUoxetine (PROzac) capsule 40 mg  40 mg Oral QAM Erwin Tatum MD   40 mg at 08/22/23 0532    fluticasone (FLONASE) 50 MCG/ACT nasal spray 2 spray  2 spray Nasal Daily PRN Erwin Tatum MD        gabapentin (NEURONTIN) capsule 300 mg  300 mg Oral Daily Erwin Tatum MD   300 mg at 08/21/23 0828    glucagon HCl (Diagnostic) injection 1 mg  1 mg Intramuscular Q15 Min PRN Rebeka Woodruff MD        heparin (porcine) 5000 UNIT/ML injection 5,000 Units  5,000 Units Subcutaneous Q12H Rebeka Woodruff MD   5,000 Units at 08/22/23 0852    hydrALAZINE (APRESOLINE) tablet 10 mg  10 mg Oral TID PRN Erwin Tatum MD        HYDROcodone-acetaminophen (NORCO)  MG per tablet 1 tablet  1 tablet Oral TID PRN Erwin Tatum MD   1 tablet at 08/21/23 1428    hydrOXYzine (ATARAX) tablet 12.5 mg  12.5 mg Oral TID PRN Erwin Tatum MD   12.5 mg at 08/21/23 2121    insulin glargine (LANTUS, SEMGLEE) injection 6 Units  6 Units Subcutaneous Nightly Oculam,  Rebeka Anguiano MD   6 Units at 08/21/23 2121    Insulin Lispro (humaLOG) injection 2-7 Units  2-7 Units Subcutaneous 4x Daily AC & at Bedtime Rebeka Woodruff MD   3 Units at 08/22/23 0851    isosorbide mononitrate (IMDUR) 24 hr tablet 30 mg  30 mg Oral QAM Rebeka Woodruff MD   30 mg at 08/22/23 0533    levothyroxine (SYNTHROID, LEVOTHROID) tablet 25 mcg  25 mcg Oral Daily Rebeka Woodruff MD   25 mcg at 08/22/23 0852    loperamide (IMODIUM) capsule 2 mg  2 mg Oral 4x Daily PRN Rebeka Woodruff MD   2 mg at 08/21/23 1428    metoprolol succinate XL (TOPROL-XL) 24 hr tablet 25 mg  25 mg Oral Daily Erwin Tatum MD   25 mg at 08/21/23 0827    multivitamin (THERAGRAN) tablet 1 tablet  1 tablet Oral Daily Erwin Tatum MD   1 tablet at 08/22/23 0852    nitroglycerin (NITROSTAT) SL tablet 0.4 mg  0.4 mg Sublingual Q5 Min PRN eRbeka Woodruff MD        ondansetron ODT (ZOFRAN-ODT) disintegrating tablet 4 mg  4 mg Oral Daily PRN Erwin Tatum MD        pantoprazole (PROTONIX) EC tablet 40 mg  40 mg Oral Daily Rebeka Woodruff MD   40 mg at 08/22/23 0852    rOPINIRole (REQUIP) tablet 0.5 mg  0.5 mg Oral BID Rebeka Woodruff MD   0.5 mg at 08/22/23 0851    sodium chloride 0.9 % flush 10 mL  10 mL Intravenous Q12H Rebeka Woodruff MD   10 mL at 08/22/23 0853    sodium chloride 0.9 % flush 10 mL  10 mL Intravenous PRN Rebeka Woodruff MD        sodium chloride 0.9 % infusion 40 mL  40 mL Intravenous PRN Rebeka Woodruff MD        tiZANidine (ZANAFLEX) tablet 4 mg  4 mg Oral Daily PRN Erwin Tatum MD   4 mg at 08/21/23 2121    traZODone (DESYREL) tablet 50 mg  50 mg Oral Nightly Rebeka Woodruff MD   50 mg at 08/21/23 2121        Physician Progress Notes (most recent note)        Narayan Mayorga DO at 08/21/23 1555              [facility]     Patient Identification:  Name:  Reny Elena  Age:  63  y.o.  Sex:  female  :  1960  MRN:  [id number]  Visit Number:  [id number]  Primary Care Provider:  Susan Stephens APRN    Length of stay:  2    Chief complaint: None    Subjective:    Patient again seen and examined at bedside with no nursing staff present.  Patient reports no significant change today compared to yesterday.  Patient states that she will have family available to assist with peritoneal dialysis tomorrow.  Unfortunately, they are not available today which will delay her anticipated date of discharge by an additional 24 hours.  ----------------------------------------------------------------------------------------------------------------------  \[facility] [place] [facility]\"" Meds:  amLODIPine, 10 mg, Oral, Daily  aspirin, 81 mg, Oral, Daily  atorvastatin, 80 mg, Oral, Nightly  FLUoxetine, 40 mg, Oral, QAM  gabapentin, 300 mg, Oral, Daily  heparin (porcine), 5,000 Units, Subcutaneous, Q12H  insulin glargine, 6 Units, Subcutaneous, Nightly  insulin lispro, 2-7 Units, Subcutaneous, 4x Daily AC & at Bedtime  isosorbide mononitrate, 30 mg, Oral, QAM  levothyroxine, 25 mcg, Oral, Daily  metoprolol succinate XL, 25 mg, Oral, Daily  multivitamin, 1 tablet, Oral, Daily  pantoprazole, 40 mg, Oral, Daily  rOPINIRole, 0.5 mg, Oral, BID  senna-docusate sodium, 2 tablet, Oral, BID  sodium chloride, 10 mL, Intravenous, Q12H  traZODone, 50 mg, Oral, Nightly         ----------------------------------------------------------------------------------------------------------------------  Vital Signs:  Temp:  [97.8 °F (36.6 °C)-98 °F (36.7 °C)] 98 °F (36.7 °C)  Heart Rate:  [64-72] 72  Resp:  [16-18] 16  BP: (120-162)/(62-80) 120/62      23  1014 23  1718 23  0500   Weight: 52.6 kg (116 lb) 56 kg (123 lb 6.4 oz) 56 kg (123 lb 6.4 oz) (new admit)     Body mass index is 21.18 kg/m².    Intake/Output Summary (Last 24 hours) at 2023 6651  Last data filed at 2023 1437  Gross per 24 hour   Intake 720 ml    Output 1125 ml   Net -405 ml       Diet: Regular/House Diet; Texture: Regular Texture (IDDSI 7); Fluid Consistency: Thin (IDDSI 0)  ----------------------------------------------------------------------------------------------------------------------  Physical exam:  Constitutional: Chronically ill-appearing  female in no apparent distress.     HENT:  Head:  Normocephalic and atraumatic.  Mouth:  Moist mucous membranes.    Eyes:  Conjunctivae and EOM are normal.  Pupils are equal, round, and reactive to light.  No scleral icterus.    Neck:  Neck supple. No thyromegaly.  No JVD present.    Cardiovascular:  Regular rate and rhythm with no murmurs, rubs, clicks or gallops appreciated.  Pulmonary/Chest:  Clear to auscultation bilaterally with no crackles, wheezes or rhonchi appreciated.  Abdominal:  Soft. Nontender. Nondistended  Bowel sounds are normal in all four quadrants. No organomegally appreciated.   Musculoskeletal:  No edema, no tenderness, and no deformity.  No red or swollen joints anywhere.    Neurological:  Alert, Cranial nerves II-XII intact with no focal deficits.  No facial droop.  No slurred speech.   Skin:  Warm and dry to palpation with no rashes or lesions appreciated.  Peripheral vascular:  2+ radial and pedal pulses in bilateral upper and lower extremities.  Psychiatric:  Alert and oriented x3, demonstrates appropriate judgment and insight.    No significant change in physical exam in comparison to 8/20/2023  -------------------------------------------------------------------------------------------------  ----------------------------------------------------------------------------------------------------------------------  Results from last 7 days   Lab Units 08/19/23  1414 08/19/23  1109   HSTROP T ng/L 150* 158*       Results from last 7 days   Lab Units 08/20/23  0124 08/19/23  1109   WBC 10*3/mm3 5.94 5.95   HEMOGLOBIN g/dL 8.0* 10.1*   HEMATOCRIT % 25.3* 31.3*   MCV fL 94.4 92.6    MCHC g/dL 31.6 32.3   PLATELETS 10*3/mm3 120* 106*   INR   --  0.85*           Results from last 7 days   Lab Units 08/21/23  0138 08/20/23  0124 08/19/23  1109   SODIUM mmol/L 137 138 138   POTASSIUM mmol/L 3.2* 3.6 4.0   MAGNESIUM mg/dL  --   --  2.2   CHLORIDE mmol/L 100 104 102   CO2 mmol/L 23.8 21.0* 22.2   BUN mg/dL 43* 49* 55*   CREATININE mg/dL 4.89* 4.89* 5.19*   CALCIUM mg/dL 7.2* 7.1* 7.4*   GLUCOSE mg/dL 209* 366* 264*   ALBUMIN g/dL  --  2.6* 3.3*   BILIRUBIN mg/dL  --  0.2 0.3   ALK PHOS U/L  --  63 82   AST (SGOT) U/L  --  11 16   ALT (SGPT) U/L  --  9 13     Estimated Creatinine Clearance: 10.1 mL/min (A) (by C-G formula based on SCr of 4.89 mg/dL (H)).    No results found for: AMMONIA      No results found for: BLOODCX  No results found for: URINECX  No results found for: WOUNDCX  No results found for: STOOLCX    I have personally looked at the labs and they are summarized above.  ----------------------------------------------------------------------------------------------------------------------  Imaging Results (Last 24 Hours)       ** No results found for the last 24 hours. **          ----------------------------------------------------------------------------------------------------------------------  Assessment and Plan:    ESRD on PD -continue peritoneal dialysis while inpatient    2.  Essential hypertension -well-controlled    3.  Type 2 diabetes mellitus -continue Accu-Cheks before every meal and nightly with sliding scale insulin    4.  Multiple electrolyte abnormalities -all electrolytes are slowly improving with resumption of peritoneal dialysis    5.  Newly diagnosed hypothyroidism -continue Synthroid 25 mcg p.o. daily    Disposition probable discharge tomorrow    Narayan Mayorga DO   08/21/23   15:55 EDT       Electronically signed by Narayan Mayorga DO at 08/21/23 160          Physical Therapy Notes (most recent note)        Dewayne Escamilla, PT at 08/21/23 7084   Version 1 of 1         Acute Care - Physical Therapy Treatment Note  [facility]     Patient Name: Reny Elena  : 1960  MRN: [id number]  Today's Date: 2023      Visit Dx:     ICD-10-CM ICD-9-CM   1. Chronic renal failure, unspecified CKD stage  N18.9 585.9   2. Self-care deficit  Z78.9 V49.89     Patient Active Problem List   Diagnosis    Tibia/fibula fracture    Iron deficiency anemia    Type 2 diabetes mellitus with hyperglycemia, with long-term current use of insulin    Wound of right ankle    Cellulitis    Metabolic encephalopathy    History of non-ST elevation myocardial infarction (NSTEMI)    CKD (chronic kidney disease) stage 3, GFR 30-59 ml/min    Elevated troponin    HTN (hypertension)    CVA (cerebral vascular accident)    Chronic diastolic CHF (congestive heart failure)    Decubitus ulcer of coccyx, unspecified pressure ulcer stage    Depression    Expressive aphasia    Impaired mobility and ADLs    Renal failure    Hyperkalemia    Subdural hematoma    Severe malnutrition    Cerebrovascular accident (CVA), unspecified mechanism    PRES (posterior reversible encephalopathy syndrome)     Past Medical History:   Diagnosis Date    Arthritis     Closed fracture of right fibula and tibia 2018    Depression     Diabetic ulcer of left foot associated with type 2 diabetes mellitus 2017    Diastolic dysfunction     Disease of thyroid gland     Elevated cholesterol     End stage renal disease     Essential hypertension     GERD (gastroesophageal reflux disease)     History of transfusion     Hyperlipidemia     Iron deficiency anemia 2018    PAD (peripheral artery disease)     Renal failure     Type 2 diabetes mellitus with hyperglycemia, with long-term current use of insulin 2018     Past Surgical History:   Procedure Laterality Date    ABDOMINAL SURGERY      BACK SURGERY      Post spinal diskectomy, osteophytectomy in one lumbar interspace    CATARACT EXTRACTION      Left       SECTION      DENTAL PROCEDURE      ENDOSCOPY      FRACTURE SURGERY      right leg    HYSTERECTOMY      INCISION AND DRAINAGE LEG Left 4/15/2017    Procedure: INCISION AND DRAINAGE LOWER EXTREMITY;  Surgeon: Basilio Morris MD;  Location: [facility];  Service:     INCISION AND DRAINAGE LEG Left 2017    Procedure: Irrigation and Debridment abcess diabetic wound left foot with deep culture;  Surgeon: Basilio Morris MD;  Location: [facility];  Service:     INSERTION PERITONEAL DIALYSIS CATHETER N/A 2021    Procedure: INSERTION PERITONEAL DIALYSIS CATHETER LAPAROSCOPIC;  Surgeon: Edy Glover MD;  Location: [facility];  Service: General;  Laterality: N/A;    KNEE ARTHROSCOPY Right     ORIF TIBIA FRACTURE Right 2018    Procedure: TIBIAL  FRACTURE OPEN REDUCTION INTERNAL FIXATION;  Surgeon: Jung Barragan MD;  Location: [facility];  Service: Orthopedics    TOE AMPUTATION Right     5th    TUBAL ABDOMINAL LIGATION       PT Assessment (last 12 hours)       PT Evaluation and Treatment       Row Name 23 1509          Physical Therapy Time and Intention    Document Type evaluation  -KM     Mode of Treatment physical therapy  -KM     Patient Effort adequate  -KM     Symptoms Noted During/After Treatment other (see comments)  loss of balance  -KM       Row Name 23 1502          General Information    Patient Profile Reviewed yes  -KM     Patient Observations alert;cooperative;agree to therapy  -KM     Prior Level of Function independent:;all household mobility  pt. received assistance w/ ADLs from significant other as needed  -KM     Existing Precautions/Restrictions fall  -KM     Risks Reviewed patient:;LOB;nausea/vomiting;dizziness;increased discomfort  -KM     Benefits Reviewed patient:;improve function;increase independence;increase strength;increase balance  -KM     Barriers to Rehab previous functional deficit  -KM       Row Name 23 4360          Living Environment    Current  Living Arrangements home  -KM     People in Home significant other  -KM     Primary Care Provided by self;spouse/significant other  -       Row Name 08/21/23 1509          Home Use of Assistive/Adaptive Equipment    Equipment Currently Used at Home rollator;wheelchair;commode  -       Row Name 08/21/23 1509          Cognition    Affect/Mental Status (Cognition) [facility]  -     Orientation Status (Cognition) oriented x 3  -KM     Follows Commands (Cognition) [facility]  -       Row Name 08/21/23 1509          Range of Motion (ROM)    Range of Motion bilateral lower extremities;ROM is [facility] Name 08/21/23 1509          Strength (Manual Muscle Testing)    Strength (Manual Muscle Testing) bilateral lower extremities;strength is [facility] Name 08/21/23 1509          Bed Mobility    Bed Mobility bed mobility (all) activities  -     All Activities, [place] (Bed Mobility) standby assist  -     Assistive Device (Bed Mobility) bed rails;head of bed elevated  -[facility] Name 08/21/23 1509          Transfers    Transfers sit-stand transfer;stand-sit transfer  -[facility] Name 08/21/23 1509          Sit-Stand Transfer    Sit-Stand [place] (Transfers) minimum assist (75% patient effort);moderate assist (50% patient effort)  -[facility] Name 08/21/23 1509          Stand-Sit Transfer    Stand-Sit [place] (Transfers) contact guard;minimum assist (75% patient effort)  -       Row Name 08/21/23 1509          Gait/Stairs (Locomotion)    Comment, (Gait/Stairs) pt. attempted ambulating but lost balance upon taking steps; pt. marched in place w/ difficulty maintaining balance  -       Row Name 08/21/23 1509          Safety Issues, Functional Mobility    Impairments Affecting Function (Mobility) balance;endurance/activity tolerance  -[facility] Name 08/21/23 1509          Balance    Balance Assessment sitting static balance;standing dynamic balance  -     Static Sitting Balance supervision   -KM     Position, Sitting Balance sitting edge of bed  -KM     Dynamic Standing Balance moderate assist  -KM       Row Name             [REMOVED] Wound 12/16/21 0850 abdomen Incision    Wound - Properties Group Placement Date: 12/16/21 -KH Placement Time: 0850  -KH Location: abdomen  -KH Primary Wound Type: Incision  -KH, lap sites x 2  Removal Date: 08/21/23  -LB Removal Time: 0832  -LB    Retired Wound - Properties Group Placement Date: 12/16/21 -KH Placement Time: 0850  -KH Location: abdomen  -KH Primary Wound Type: Incision  -KH, lap sites x 2  Removal Date: 08/21/23  -LB Removal Time: 0832  -LB    Retired Wound - Properties Group Date first assessed: 12/16/21 -KH Time first assessed: 0850 -KH Location: abdomen  -KH Primary Wound Type: Incision  -KH, lap sites x 2  Resolution Date: 08/21/23  -LB Resolution Time: 0832  -LB      Row Name 08/21/23 1509          Plan of Care Review    Plan of Care Reviewed With patient  -KM     Outcome Evaluation Pt. evaluation completed during PT session. She was able to perform bed mobility w/ SBA. She was able to perform sit-stand-sit w/ Yusef-modA. She was unable to ambulate at time of evaluation d/t impaired balance. Pt. would benefit from skilled PT services.  -KM       Row Name 08/21/23 1508          Therapy Assessment/Plan (PT)    Patient/Family Therapy Goals Statement (PT) return to PLOF  -KM     Functional Level at Time of Evaluation (PT) modA  -KM     PT Diagnosis (PT) decreased mobility  -KM     Rehab Potential (PT) good, to achieve stated therapy goals  -KM     Criteria for Skilled Interventions Met (PT) yes;skilled treatment is necessary  -KM     Therapy Frequency (PT) 2 times/wk  2-5x/wk  -KM     Predicted Duration of Therapy Intervention (PT) until discharge  -KM     Problem List (PT) problems related to;balance;mobility;strength  -KM     Activity Limitations Related to Problem List (PT) unable to ambulate safely;unable to transfer safely  -KM       Row Name  08/21/23 1509          Therapy Plan Review/Discharge Plan (PT)    Therapy Plan Review (PT) evaluation/treatment results reviewed;care plan/treatment goals reviewed;risks/benefits reviewed;patient  -KM       Row Name 08/21/23 1509          Physical Therapy Goals    Bed Mobility Goal Selection (PT) bed mobility, PT goal 1  -KM     Transfer Goal Selection (PT) transfer, PT goal 1  -KM     Gait Training Goal Selection (PT) gait training, PT goal 1  -KM       Row Name 08/21/23 1509          Bed Mobility Goal 1 (PT)    Activity/Assistive Device (Bed Mobility Goal 1, PT) bed mobility activities, all  -KM     [place] Level/Cues Needed (Bed Mobility Goal 1, PT) modified independence  -KM     Time Frame (Bed Mobility Goal 1, PT) by discharge  -KM       Row Name 08/21/23 1509          Transfer Goal 1 (PT)    Activity/Assistive Device (Transfer Goal 1, PT) transfers, all;walker, rolling  -KM     [place] Level/Cues Needed (Transfer Goal 1, PT) standby assist  -KM     Time Frame (Transfer Goal 1, PT) by discharge  -KM       Row Name 08/21/23 1509          Gait Training Goal 1 (PT)    Activity/Assistive Device (Gait Training Goal 1, PT) gait (walking locomotion);assistive device use;walker, rolling  -KM     [place] Level (Gait Training Goal 1, PT) contact guard required  -KM     Distance (Gait Training Goal 1, PT) 20'  -KM     Time Frame (Gait Training Goal 1, PT) by discharge  -               User Key  (r) = Recorded By, (t) = Taken By, (c) = Cosigned By      Initials Name Provider Type    Adriana Leslie, RN Registered Nurse    Dewayne Heard, PT Physical Therapist    Elizabeth Hawkins, RN Registered Nurse                    Physical Therapy Education       Title: PT OT SLP Therapies (Done)       Topic: Physical Therapy (Done)       Point: Mobility training (Done)       Learning Progress Summary             Patient Acceptance, E,TB, VU by SHAYNE at 8/21/2023 1527    Acceptance, E, VU by SARAH at 8/19/2023 1810                          Point: Home exercise program (Done)       Learning Progress Summary             Patient Acceptance, E,TB, VU by KM at 8/21/2023 1527    Acceptance, E, VU by KJ at 8/19/2023 1810                         Point: Body mechanics (Done)       Learning Progress Summary             Patient Acceptance, E,TB, VU by KM at 8/21/2023 1527    Acceptance, E, VU by KJ at 8/19/2023 1810                         Point: Precautions (Done)       Learning Progress Summary             Patient Acceptance, E,TB, VU by KM at 8/21/2023 1527    Acceptance, E, VU by KJ at 8/19/2023 1810                                         User Key       Initials Effective Dates Name Provider Type Discipline     05/24/22 -  Dewayne Escamilla PT Physical Therapist PT     06/08/23 -  Alice Lagunas RN Registered Nurse Nurse                  PT Recommendation and Plan  Anticipated Discharge Disposition (PT): other (see comments) (TBD based on pt. progress)  Planned Therapy Interventions (PT): balance training, bed mobility training, gait training, home exercise program, patient/family education, postural re-education, ROM (range of motion), strengthening, stretching, transfer training  Therapy Frequency (PT): 2 times/wk (2-5x/wk)  Plan of Care Reviewed With: patient  Outcome Evaluation: Pt. evaluation completed during PT session. She was able to perform bed mobility w/ SBA. She was able to perform sit-stand-sit w/ Yusef-modA. She was unable to ambulate at time of evaluation d/t impaired balance. Pt. would benefit from skilled PT services.       Time Calculation:    PT Charges       Row Name 08/21/23 1509             Time Calculation    PT Received On 08/21/23  -      PT Goal Re-Cert Due Date 09/04/23  -                User Key  (r) = Recorded By, (t) = Taken By, (c) = Cosigned By      Initials Name Provider Type    Dewayne Heard PT Physical Therapist                  Therapy Charges for Today       Code Description Service  Date Service Provider Modifiers Qty    92653955908 HC PT EVAL MOD COMPLEXITY 4 2023 Dewayne Escamilla, PT GP 1            PT G-Codes  AM-PAC 6 Clicks Score (PT): 17    Dewayne Escamilla, PT  2023      Electronically signed by Dewayne Escamilla, PT at 23 1527          Occupational Therapy Notes (most recent note)        Tammie Dugan, OT at 23 1538          Patient Name: Reny Elena  : 1960    MRN: [id number]                              Today's Date: 2023       Admit Date: 2023    Visit Dx:     ICD-10-CM ICD-9-CM   1. Chronic renal failure, unspecified CKD stage  N18.9 585.9   2. Self-care deficit  Z78.9 V49.89     Patient Active Problem List   Diagnosis    Tibia/fibula fracture    Iron deficiency anemia    Type 2 diabetes mellitus with hyperglycemia, with long-term current use of insulin    Wound of right ankle    Cellulitis    Metabolic encephalopathy    History of non-ST elevation myocardial infarction (NSTEMI)    CKD (chronic kidney disease) stage 3, GFR 30-59 ml/min    Elevated troponin    HTN (hypertension)    CVA (cerebral vascular accident)    Chronic diastolic CHF (congestive heart failure)    Decubitus ulcer of coccyx, unspecified pressure ulcer stage    Depression    Expressive aphasia    Impaired mobility and ADLs    Renal failure    Hyperkalemia    Subdural hematoma    Severe malnutrition    Cerebrovascular accident (CVA), unspecified mechanism    PRES (posterior reversible encephalopathy syndrome)     Past Medical History:   Diagnosis Date    Arthritis     Closed fracture of right fibula and tibia 2018    Depression     Diabetic ulcer of left foot associated with type 2 diabetes mellitus 2017    Diastolic dysfunction     Disease of thyroid gland     Elevated cholesterol     End stage renal disease     Essential hypertension     GERD (gastroesophageal reflux disease)     History of transfusion     Hyperlipidemia     Iron deficiency anemia 2018    PAD  (peripheral artery disease)     Renal failure     Type 2 diabetes mellitus with hyperglycemia, with long-term current use of insulin 2018     Past Surgical History:   Procedure Laterality Date    ABDOMINAL SURGERY      BACK SURGERY      Post spinal diskectomy, osteophytectomy in one lumbar interspace    CATARACT EXTRACTION      Left      SECTION      DENTAL PROCEDURE      ENDOSCOPY      FRACTURE SURGERY      right leg    HYSTERECTOMY      INCISION AND DRAINAGE LEG Left 4/15/2017    Procedure: INCISION AND DRAINAGE LOWER EXTREMITY;  Surgeon: Basilio Morris MD;  Location: [facility] OR;  Service:     INCISION AND DRAINAGE LEG Left 2017    Procedure: Irrigation and Debridment abcess diabetic wound left foot with deep culture;  Surgeon: Basilio Morris MD;  Location: [facility] OR;  Service:     INSERTION PERITONEAL DIALYSIS CATHETER N/A 2021    Procedure: INSERTION PERITONEAL DIALYSIS CATHETER LAPAROSCOPIC;  Surgeon: Edy Glover MD;  Location: [facility] OR;  Service: General;  Laterality: N/A;    KNEE ARTHROSCOPY Right     ORIF TIBIA FRACTURE Right 2018    Procedure: TIBIAL  FRACTURE OPEN REDUCTION INTERNAL FIXATION;  Surgeon: Jung Barragan MD;  Location: [facility] OR;  Service: Orthopedics    TOE AMPUTATION Right     5th    TUBAL ABDOMINAL LIGATION        General Information       Row Name 23 1528          OT Time and Intention    Document Type evaluation  -     Mode of Treatment individual therapy;occupational therapy  -       Row Name 23 1528          General Information    Patient Profile Reviewed yes  -     Prior Level of Function --  assist as needed from significant other; wheelchair, rolling walker, shower chair, BSC; on dialysis  -     Existing Precautions/Restrictions fall  -     Barriers to Rehab previous functional deficit  -       Row Name 23 1528          Occupational Profile    Reason for Services/Referral (Occupational Profile) Patient was admitted  to [facility] on 8/19/2023. She was referred for OT evaluation due to change in functional performance with ADLs, functional mobility, and/or transfers.  -       Row Name 08/21/23 1528          Living Environment    People in Home significant other  -[facility] Name 08/21/23 1528          Cognition    Orientation Status (Cognition) oriented x 3  -[facility] Name 08/21/23 1528          Safety Issues, Functional Mobility    Impairments Affecting Function (Mobility) balance;endurance/activity tolerance;strength  -               User Key  (r) = Recorded By, (t) = Taken By, (c) = Cosigned By      Initials Name Provider Type     Tammie Dugan, OT Occupational Therapist                     Mobility/ADL's       Row Name 08/21/23 1532          Bed Mobility    Bed Mobility bed mobility (all) activities  -     All Activities, [place] (Bed Mobility) standby assist  -     Assistive Device (Bed Mobility) bed rails;head of bed elevated  -[facility] Name 08/21/23 1532          Sit-Stand Transfer    Sit-Stand [place] (Transfers) minimum assist (75% patient effort);moderate assist (50% patient effort)  -[facility] Name 08/21/23 1532          Stand-Sit Transfer    Stand-Sit [place] (Transfers) contact guard;minimum assist (75% patient effort)  -[facility] Name 08/21/23 1532          Activities of Daily Living    BADL Assessment/Intervention bathing;upper body dressing;lower body dressing;grooming;feeding;toileting  -[facility] Name 08/21/23 1532          Bathing Assessment/Intervention    [place] Level (Bathing) bathing skills;moderate assist (50% patient effort)  -[facility] Name 08/21/23 1532          Upper Body Dressing Assessment/Training    [place] Level (Upper Body Dressing) upper body dressing skills;moderate assist (50% patient effort)  -[facility] Name 08/21/23 1532          Lower Body Dressing Assessment/Training    [place] Level (Lower Body Dressing) lower  body dressing skills;moderate assist (50% patient effort);maximum assist (25% patient effort)  -       Row Name 08/21/23 1532          Grooming Assessment/Training    [place] Level (Grooming) grooming skills;moderate assist (50% patient effort)  -       Row Name 08/21/23 1532          Self-Feeding Assessment/Training    [place] Level (Feeding) feeding skills;set up  -[facility] Name 08/21/23 1532          Toileting Assessment/Training    [place] Level (Toileting) toileting skills;moderate assist (50% patient effort)  -               User Key  (r) = Recorded By, (t) = Taken By, (c) = Cosigned By      Initials Name Provider Type    Tammie Ro OT Occupational Therapist                   Obj/Interventions       Row Name 08/21/23 1534          Sensory Assessment (Somatosensory)    Sensory Assessment (Somatosensory) UE sensation intact  -[facility] Name 08/21/23 1534          Vision Assessment/Intervention    Visual Impairment/Limitations WFL  -[facility] Name 08/21/23 1534          Range of Motion Comprehensive    General Range of Motion bilateral upper extremity ROM WFL  -[facility] Name 08/21/23 1534          Strength Comprehensive (MMT)    Comment, General Manual Muscle Testing (MMT) Assessment 4/5 MMT in BUEs  -[facility] Name 08/21/23 1534          Motor Skills    Motor Skills coordination;functional endurance  -     Coordination WFL;bilateral;upper extremity  -KP     Functional Endurance fair  -[facility] Name 08/21/23 1534          Balance    Balance Assessment sitting static balance;standing static balance  -     Static Sitting Balance supervision  -     Static Standing Balance minimal assist;moderate assist  -               User Key  (r) = Recorded By, (t) = Taken By, (c) = Cosigned By      Initials Name Provider Type    Tammie Ro OT Occupational Therapist                   Goals/Plan       Row Name 08/21/23 1536          Transfer Goal 1 (OT)     Activity/Assistive Device (Transfer Goal 1, OT) toilet  -     [place] Level/Cues Needed (Transfer Goal 1, OT) minimum assist (75% or more patient effort)  -     Time Frame (Transfer Goal 1, OT) by discharge  -       Row Name 08/21/23 1534          Dressing Goal 1 (OT)    Activity/Device (Dressing Goal 1, OT) dressing skills, all  -     [place]/Cues Needed (Dressing Goal 1, OT) minimum assist (75% or more patient effort)  -       Row Name 08/21/23 1532          Problem Specific Goal 1 (OT)    Problem Specific Goal 1 (OT) Patient will perform sustained activity X12 minutes to promote functional endurance/activity tolerance needed for daily routine.  -     Time Frame (Problem Specific Goal 1, OT) by discharge  -       Row Name 08/21/23 1539          Therapy Assessment/Plan (OT)    Planned Therapy Interventions (OT) activity tolerance training;BADL retraining;transfer/mobility retraining;strengthening exercise;occupation/activity based interventions;ROM/therapeutic exercise;patient/caregiver education/training  -               User Key  (r) = Recorded By, (t) = Taken By, (c) = Cosigned By      Initials Name Provider Type     Tammie Dugan, OT Occupational Therapist                   Clinical Impression       Row Name 08/21/23 1539          Pain Assessment    Pretreatment Pain Rating 0/10 - no pain  -     Posttreatment Pain Rating 0/10 - no pain  -       Row Name 08/21/23 1538          Plan of Care Review    Plan of Care Reviewed With patient  -     Progress no change  -     Outcome Evaluation Patient seen for OT evaluation. She presents with functional limitations including generalized weakness, impaired balance, and limited activity tolerance/functional endurance. She would benefit from skilled OT services to address functional limitations to promote highest level of independence and safety.  -       Row Name 08/21/23 1537          Therapy Assessment/Plan (OT)    Patient/Family  Therapy Goal Statement (OT) be able to return home  -     Rehab Potential (OT) good, to achieve stated therapy goals  -     Criteria for Skilled Therapeutic Interventions Met (OT) yes;meets criteria;skilled treatment is necessary  -     Therapy Frequency (OT) 3 times/wk  3-5x/week as able and available to support functional progress  -       Row Name 08/21/23 1535          Therapy Plan Review/Discharge Plan (OT)    Anticipated Discharge Disposition (OT) --  to be determined  -       Row Name 08/21/23 1535          Positioning and Restraints    Pre-Treatment Position in bed  -KP     Post Treatment Position bed  -KP     In Bed fowlers;call light within reach;encouraged to call for assist;exit alarm on  -KP               User Key  (r) = Recorded By, (t) = Taken By, (c) = Cosigned By      Initials Name Provider Type    Tammie Ro, OT Occupational Therapist                   Outcome Measures       Row Name 08/21/23 0750          How much help from another person do you currently need...    Turning from your back to your side while in flat bed without using bedrails? 3  -LB     Moving from lying on back to sitting on the side of a flat bed without bedrails? 3  -LB     Moving to and from a bed to a chair (including a wheelchair)? 3  -LB     Standing up from a chair using your arms (e.g., wheelchair, bedside chair)? 3  -LB     Climbing 3-5 steps with a railing? 2  -LB     To walk in hospital room? 3  -LB     AM-PAC 6 Clicks Score (PT) 17  -LB     Highest level of mobility 5 --> Static standing  -LB               User Key  (r) = Recorded By, (t) = Taken By, (c) = Cosigned By      Initials Name Provider Type    Elizabeth Hawkins, RN Registered Nurse                      OT Recommendation and Plan  Planned Therapy Interventions (OT): activity tolerance training, BADL retraining, transfer/mobility retraining, strengthening exercise, occupation/activity based interventions, ROM/therapeutic exercise,  patient/caregiver education/training  Therapy Frequency (OT): 3 times/wk (3-5x/week as able and available to support functional progress)  Plan of Care Review  Plan of Care Reviewed With: patient  Progress: no change  Outcome Evaluation: Patient seen for OT evaluation. She presents with functional limitations including generalized weakness, impaired balance, and limited activity tolerance/functional endurance. She would benefit from skilled OT services to address functional limitations to promote highest level of independence and safety.     Time Calculation:         Time Calculation- OT       Row Name 08/21/23 1537             Time Calculation- OT    OT Received On 08/21/23  -                User Key  (r) = Recorded By, (t) = Taken By, (c) = Cosigned By      Initials Name Provider Type     Tammie Dugan OT Occupational Therapist                  Therapy Charges for Today       Code Description Service Date Service Provider Modifiers Qty    [id number]  OT EVAL MOD COMPLEXITY 4 8/21/2023 Tammie Dugan OT GO 1              Tammie Dugan OT  8/21/2023    Electronically signed by Tammie Dugan OT at 08/21/23 1538       Speech Language Pathology Notes (most recent note)    No notes exist for this encounter.       ADL Documentation (most recent)      Flowsheet Row Most Recent Value   Transferring 3 - assistive equipment and person   Toileting 3 - assistive equipment and person   Bathing 2 - assistive person   Dressing 2 - assistive person   Eating 0 - independent   Communication 0 - understands/communicates without difficulty   Swallowing 0 - swallows foods/liquids without difficulty   Equipment Currently Used at Home rollator, wheelchair, commode            Discharge Summary    No notes of this type exist for this encounter.       Discharge Order (From admission, onward)       Start     Ordered    08/22/23 0825  Discharge patient  Once        Expected Discharge Date: 08/22/23   Expected Discharge Time:  Morning   Discharge Disposition: Home or Self Care   Physician of Record for Attribution - Please select from Treatment Team: RYAN FAUSTIN [[id number]]   Review needed by CMO to determine Physician of Record: No      Question Answer Comment   Physician of Record for Attribution - Please select from Treatment Team RYAN FAUSTIN    Review needed by CMO to determine Physician of Record No        08/22/23 0839

## 2023-08-22 NOTE — PROGRESS NOTES
Nephrology Progress Note      Subjective     Remains stable, no complaints.  No chest pain shortness of breath    Objective       Vital signs :     Temp:  [97.9 °F (36.6 °C)-98.4 °F (36.9 °C)] 98.1 °F (36.7 °C)  Heart Rate:  [61-72] 61  Resp:  [16-18] 16  BP: (101-150)/(55-70) 105/57    Intake/Output                         08/20/23 0701 - 08/21/23 0700 08/21/23 0701 - 08/22/23 0700     [id number] 3404-0557 Total 3032-6403 3122-0642 Total                 Intake    P.O.  480  -- 480  960  620 1580    Total Intake 480 -- 480        Output    Urine  875  -- 875  400  125 525    Dialysis  --  350 350  --  -- --    Total Output  400 125 525             Physical Exam:    General Appearance : alert, pleasant, appears stated age, cooperative and alert  Lungs : clear to auscultation, respirations regular  Heart :  regular rhythm & normal rate, normal S1, S2 and no murmur, no rub  Abdomen : soft, non distended  Extremities : No edema,   Neurologic :   orientated to person, place, time and situation, Grossly no focal deficits        Laboratory Data :     Albumin Albumin   Date Value Ref Range Status   08/20/2023 2.6 (L) 3.5 - 5.2 g/dL Final   08/19/2023 3.3 (L) 3.5 - 5.2 g/dL Final      Magnesium Magnesium   Date Value Ref Range Status   08/19/2023 2.2 1.6 - 2.4 mg/dL Final          PTH               No results found for: PTH    CBC and coagulation:  Results from last 7 days   Lab Units 08/20/23  0124 08/19/23  1109   WBC 10*3/mm3 5.94 5.95   HEMOGLOBIN g/dL 8.0* 10.1*   HEMATOCRIT % 25.3* 31.3*   MCV fL 94.4 92.6   MCHC g/dL 31.6 32.3   PLATELETS 10*3/mm3 120* 106*   INR   --  0.85*     Acid/base balance:      Renal and electrolytes:    Results from last 7 days   Lab Units 08/22/23  0139 08/21/23  0138 08/20/23  0124 08/19/23  1109   SODIUM mmol/L 134* 137 138 138   POTASSIUM mmol/L 3.7 3.2* 3.6 4.0   MAGNESIUM mg/dL  --   --   --  2.2   CHLORIDE mmol/L 97* 100 104 102   CO2 mmol/L 24.2 23.8 21.0* 22.2    BUN mg/dL 41* 43* 49* 55*   CREATININE mg/dL 4.97* 4.89* 4.89* 5.19*   CALCIUM mg/dL 6.8* 7.2* 7.1* 7.4*   PHOSPHORUS mg/dL  --   --   --  6.4*     Estimated Creatinine Clearance: 10.1 mL/min (A) (by C-G formula based on SCr of 4.97 mg/dL (H)).  @GFRCG:3@   Liver and pancreatic function:  Results from last 7 days   Lab Units 08/20/23  0124 08/19/23  1109   ALBUMIN g/dL 2.6* 3.3*   BILIRUBIN mg/dL 0.2 0.3   ALK PHOS U/L 63 82   AST (SGOT) U/L 11 16   ALT (SGPT) U/L 9 13         Cardiac:      Liver and pancreatic function:  Results from last 7 days   Lab Units 08/20/23  0124 08/19/23  1109   ALBUMIN g/dL 2.6* 3.3*   BILIRUBIN mg/dL 0.2 0.3   ALK PHOS U/L 63 82   AST (SGOT) U/L 11 16   ALT (SGPT) U/L 9 13       Medications :     amLODIPine, 10 mg, Oral, Daily  aspirin, 81 mg, Oral, Daily  atorvastatin, 80 mg, Oral, Nightly  FLUoxetine, 40 mg, Oral, QAM  gabapentin, 300 mg, Oral, Daily  heparin (porcine), 5,000 Units, Subcutaneous, Q12H  insulin glargine, 6 Units, Subcutaneous, Nightly  insulin lispro, 2-7 Units, Subcutaneous, 4x Daily AC & at Bedtime  isosorbide mononitrate, 30 mg, Oral, QAM  levothyroxine, 25 mcg, Oral, Daily  metoprolol succinate XL, 25 mg, Oral, Daily  multivitamin, 1 tablet, Oral, Daily  pantoprazole, 40 mg, Oral, Daily  rOPINIRole, 0.5 mg, Oral, BID  senna-docusate sodium, 2 tablet, Oral, BID  sodium chloride, 10 mL, Intravenous, Q12H  traZODone, 50 mg, Oral, Nightly             Assessment & Plan     1.  End-stage renal disease on peritoneal dialysis  2.  Type 2 diabetes with nephropathy  3.  Chronic diarrhea now worse  4.  New onset hypothyroidism  5.  Frailty  6.  Hypertension  7.  Malnutrition  8.  Diabetic neuropathy and myopathy with inability to ambulate independently  9.  Anemia of CKD    Grossly stable from renal standpoint, continue on nightly PD with 1.5% dialysate    Patient would like to continue on peritoneal dialysis for now and will let clinic know if nobody is able to assist her  in nightly PD at home, to transition to hemodialysis  Follow-up iron studies      Nicholas Arroyo MD  08/22/23  07:26 EDT

## 2023-08-23 ENCOUNTER — HOSPITAL ENCOUNTER (INPATIENT)
Facility: HOSPITAL | Age: 63
DRG: 291 | End: 2023-08-23
Attending: FAMILY MEDICINE | Admitting: FAMILY MEDICINE
Payer: MEDICAID

## 2023-08-23 VITALS
OXYGEN SATURATION: 94 % | BODY MASS INDEX: 21.32 KG/M2 | RESPIRATION RATE: 18 BRPM | WEIGHT: 124.9 LBS | HEART RATE: 74 BPM | DIASTOLIC BLOOD PRESSURE: 64 MMHG | TEMPERATURE: 98.4 F | SYSTOLIC BLOOD PRESSURE: 128 MMHG | HEIGHT: 64 IN

## 2023-08-23 DIAGNOSIS — R53.81 DEBILITY: Primary | ICD-10-CM

## 2023-08-23 LAB
GLUCOSE BLDC GLUCOMTR-MCNC: 179 MG/DL (ref 70–130)
GLUCOSE BLDC GLUCOMTR-MCNC: 199 MG/DL (ref 70–130)
GLUCOSE BLDC GLUCOMTR-MCNC: 203 MG/DL (ref 70–130)
GLUCOSE BLDC GLUCOMTR-MCNC: 255 MG/DL (ref 70–130)
GLUCOSE BLDC GLUCOMTR-MCNC: 311 MG/DL (ref 70–130)
SARS-COV-2 AG RESP QL IA.RAPID: NORMAL

## 2023-08-23 PROCEDURE — 63710000001 INSULIN LISPRO (HUMAN) PER 5 UNITS: Performed by: HOSPITALIST

## 2023-08-23 PROCEDURE — 97116 GAIT TRAINING THERAPY: CPT

## 2023-08-23 PROCEDURE — 25010000002 HEPARIN (PORCINE) PER 1000 UNITS: Performed by: HOSPITALIST

## 2023-08-23 PROCEDURE — 3E1M39Z IRRIGATION OF PERITONEAL CAVITY USING DIALYSATE, PERCUTANEOUS APPROACH: ICD-10-PCS | Performed by: FAMILY MEDICINE

## 2023-08-23 PROCEDURE — 63710000001 INSULIN LISPRO (HUMAN) PER 5 UNITS: Performed by: INTERNAL MEDICINE

## 2023-08-23 PROCEDURE — 82948 REAGENT STRIP/BLOOD GLUCOSE: CPT

## 2023-08-23 PROCEDURE — 99239 HOSP IP/OBS DSCHRG MGMT >30: CPT | Performed by: INTERNAL MEDICINE

## 2023-08-23 PROCEDURE — 63710000001 INSULIN GLARGINE PER 5 UNITS: Performed by: INTERNAL MEDICINE

## 2023-08-23 PROCEDURE — 87426 SARSCOV CORONAVIRUS AG IA: CPT | Performed by: INTERNAL MEDICINE

## 2023-08-23 RX ORDER — TRAZODONE HYDROCHLORIDE 50 MG/1
50 TABLET ORAL NIGHTLY
Status: DISCONTINUED | OUTPATIENT
Start: 2023-08-23 | End: 2023-09-07 | Stop reason: HOSPADM

## 2023-08-23 RX ORDER — HYDROCODONE BITARTRATE AND ACETAMINOPHEN 10; 325 MG/1; MG/1
1 TABLET ORAL 3 TIMES DAILY PRN
Status: CANCELLED | OUTPATIENT
Start: 2023-08-23

## 2023-08-23 RX ORDER — FLUOXETINE HYDROCHLORIDE 20 MG/1
40 CAPSULE ORAL DAILY
Status: DISCONTINUED | OUTPATIENT
Start: 2023-08-24 | End: 2023-09-07 | Stop reason: HOSPADM

## 2023-08-23 RX ORDER — ASPIRIN 81 MG/1
81 TABLET ORAL DAILY
Status: CANCELLED | OUTPATIENT
Start: 2023-08-24

## 2023-08-23 RX ORDER — AMLODIPINE BESYLATE 10 MG/1
10 TABLET ORAL DAILY
Status: CANCELLED | OUTPATIENT
Start: 2023-08-24

## 2023-08-23 RX ORDER — HYDROXYZINE HYDROCHLORIDE 25 MG/1
12.5 TABLET, FILM COATED ORAL 3 TIMES DAILY PRN
Status: DISCONTINUED | OUTPATIENT
Start: 2023-08-23 | End: 2023-09-07 | Stop reason: HOSPADM

## 2023-08-23 RX ORDER — HYDROXYZINE HYDROCHLORIDE 25 MG/1
12.5 TABLET, FILM COATED ORAL 3 TIMES DAILY PRN
Status: CANCELLED | OUTPATIENT
Start: 2023-08-23

## 2023-08-23 RX ORDER — METOPROLOL SUCCINATE 25 MG/1
25 TABLET, EXTENDED RELEASE ORAL DAILY
Status: CANCELLED | OUTPATIENT
Start: 2023-08-24

## 2023-08-23 RX ORDER — PANTOPRAZOLE SODIUM 40 MG/1
40 TABLET, DELAYED RELEASE ORAL DAILY
Status: DISCONTINUED | OUTPATIENT
Start: 2023-08-24 | End: 2023-09-07 | Stop reason: HOSPADM

## 2023-08-23 RX ORDER — HYDRALAZINE HYDROCHLORIDE 10 MG/1
10 TABLET, FILM COATED ORAL 3 TIMES DAILY
COMMUNITY
End: 2023-09-07 | Stop reason: HOSPADM

## 2023-08-23 RX ORDER — LISINOPRIL 20 MG/1
20 TABLET ORAL DAILY PRN
COMMUNITY

## 2023-08-23 RX ORDER — ROPINIROLE 0.25 MG/1
0.5 TABLET, FILM COATED ORAL 2 TIMES DAILY
Status: DISCONTINUED | OUTPATIENT
Start: 2023-08-23 | End: 2023-09-07 | Stop reason: HOSPADM

## 2023-08-23 RX ORDER — ROPINIROLE 0.25 MG/1
0.5 TABLET, FILM COATED ORAL 2 TIMES DAILY
Status: CANCELLED | OUTPATIENT
Start: 2023-08-23

## 2023-08-23 RX ORDER — METOPROLOL SUCCINATE 25 MG/1
25 TABLET, EXTENDED RELEASE ORAL DAILY
Status: DISCONTINUED | OUTPATIENT
Start: 2023-08-24 | End: 2023-09-07 | Stop reason: HOSPADM

## 2023-08-23 RX ORDER — ISOSORBIDE MONONITRATE 30 MG/1
30 TABLET, EXTENDED RELEASE ORAL EVERY MORNING
Status: CANCELLED | OUTPATIENT
Start: 2023-08-24

## 2023-08-23 RX ORDER — INSULIN LISPRO 100 [IU]/ML
2-7 INJECTION, SOLUTION INTRAVENOUS; SUBCUTANEOUS
Status: CANCELLED | OUTPATIENT
Start: 2023-08-23

## 2023-08-23 RX ORDER — ASPIRIN 81 MG/1
81 TABLET ORAL DAILY
Status: DISCONTINUED | OUTPATIENT
Start: 2023-08-24 | End: 2023-09-07 | Stop reason: HOSPADM

## 2023-08-23 RX ORDER — LOPERAMIDE HYDROCHLORIDE 2 MG/1
2 CAPSULE ORAL 4 TIMES DAILY PRN
Status: CANCELLED | OUTPATIENT
Start: 2023-08-23

## 2023-08-23 RX ORDER — PANTOPRAZOLE SODIUM 40 MG/1
40 TABLET, DELAYED RELEASE ORAL DAILY
Status: CANCELLED | OUTPATIENT
Start: 2023-08-24

## 2023-08-23 RX ORDER — DIPHENOXYLATE HYDROCHLORIDE AND ATROPINE SULFATE 2.5; .025 MG/1; MG/1
1 TABLET ORAL DAILY
Status: DISCONTINUED | OUTPATIENT
Start: 2023-08-24 | End: 2023-09-07 | Stop reason: HOSPADM

## 2023-08-23 RX ORDER — ATORVASTATIN CALCIUM 40 MG/1
80 TABLET, FILM COATED ORAL NIGHTLY
Status: CANCELLED | OUTPATIENT
Start: 2023-08-23

## 2023-08-23 RX ORDER — INSULIN LISPRO 100 [IU]/ML
2-7 INJECTION, SOLUTION INTRAVENOUS; SUBCUTANEOUS
Status: DISCONTINUED | OUTPATIENT
Start: 2023-08-23 | End: 2023-08-28

## 2023-08-23 RX ORDER — GABAPENTIN 300 MG/1
300 CAPSULE ORAL DAILY
Status: DISCONTINUED | OUTPATIENT
Start: 2023-08-24 | End: 2023-08-24

## 2023-08-23 RX ORDER — TIZANIDINE 4 MG/1
4 TABLET ORAL DAILY PRN
Status: DISCONTINUED | OUTPATIENT
Start: 2023-08-23 | End: 2023-09-07 | Stop reason: HOSPADM

## 2023-08-23 RX ORDER — AMLODIPINE BESYLATE 10 MG/1
10 TABLET ORAL DAILY
Status: DISCONTINUED | OUTPATIENT
Start: 2023-08-24 | End: 2023-09-01

## 2023-08-23 RX ORDER — TIZANIDINE 4 MG/1
4 TABLET ORAL DAILY PRN
Status: CANCELLED | OUTPATIENT
Start: 2023-08-23

## 2023-08-23 RX ORDER — LOPERAMIDE HYDROCHLORIDE 2 MG/1
2 CAPSULE ORAL 4 TIMES DAILY PRN
Status: DISCONTINUED | OUTPATIENT
Start: 2023-08-23 | End: 2023-09-07 | Stop reason: HOSPADM

## 2023-08-23 RX ORDER — HYDROCODONE BITARTRATE AND ACETAMINOPHEN 10; 325 MG/1; MG/1
1 TABLET ORAL 3 TIMES DAILY PRN
Status: DISCONTINUED | OUTPATIENT
Start: 2023-08-23 | End: 2023-09-07 | Stop reason: HOSPADM

## 2023-08-23 RX ORDER — TRAZODONE HYDROCHLORIDE 50 MG/1
50 TABLET ORAL NIGHTLY
Status: CANCELLED | OUTPATIENT
Start: 2023-08-23

## 2023-08-23 RX ORDER — ISOSORBIDE MONONITRATE 30 MG/1
30 TABLET, EXTENDED RELEASE ORAL DAILY
Status: DISCONTINUED | OUTPATIENT
Start: 2023-08-24 | End: 2023-09-07 | Stop reason: HOSPADM

## 2023-08-23 RX ORDER — DIPHENOXYLATE HYDROCHLORIDE AND ATROPINE SULFATE 2.5; .025 MG/1; MG/1
1 TABLET ORAL DAILY
Status: CANCELLED | OUTPATIENT
Start: 2023-08-24

## 2023-08-23 RX ORDER — LEVOTHYROXINE SODIUM 0.03 MG/1
25 TABLET ORAL DAILY
Status: CANCELLED | OUTPATIENT
Start: 2023-08-24

## 2023-08-23 RX ORDER — ONDANSETRON 4 MG/1
4 TABLET, ORALLY DISINTEGRATING ORAL DAILY PRN
Status: DISCONTINUED | OUTPATIENT
Start: 2023-08-23 | End: 2023-09-07 | Stop reason: HOSPADM

## 2023-08-23 RX ORDER — ATORVASTATIN CALCIUM 40 MG/1
80 TABLET, FILM COATED ORAL NIGHTLY
Status: DISCONTINUED | OUTPATIENT
Start: 2023-08-23 | End: 2023-09-07 | Stop reason: HOSPADM

## 2023-08-23 RX ORDER — ONDANSETRON 4 MG/1
4 TABLET, ORALLY DISINTEGRATING ORAL DAILY PRN
Status: CANCELLED | OUTPATIENT
Start: 2023-08-23

## 2023-08-23 RX ORDER — FLUOXETINE HYDROCHLORIDE 20 MG/1
40 CAPSULE ORAL EVERY MORNING
Status: CANCELLED | OUTPATIENT
Start: 2023-08-24

## 2023-08-23 RX ORDER — LEVOTHYROXINE SODIUM 0.03 MG/1
25 TABLET ORAL DAILY
Status: DISCONTINUED | OUTPATIENT
Start: 2023-08-24 | End: 2023-08-29

## 2023-08-23 RX ORDER — GABAPENTIN 300 MG/1
300 CAPSULE ORAL DAILY
Status: CANCELLED | OUTPATIENT
Start: 2023-08-24

## 2023-08-23 RX ORDER — ISOSORBIDE MONONITRATE 30 MG/1
30 TABLET, EXTENDED RELEASE ORAL DAILY
COMMUNITY
End: 2023-09-07 | Stop reason: HOSPADM

## 2023-08-23 RX ADMIN — ROPINIROLE HYDROCHLORIDE 0.5 MG: 0.25 TABLET, FILM COATED ORAL at 21:23

## 2023-08-23 RX ADMIN — INSULIN LISPRO 4 UNITS: 100 INJECTION, SOLUTION INTRAVENOUS; SUBCUTANEOUS at 08:25

## 2023-08-23 RX ADMIN — GABAPENTIN 300 MG: 300 CAPSULE ORAL at 08:25

## 2023-08-23 RX ADMIN — FLUOXETINE HYDROCHLORIDE 40 MG: 20 CAPSULE ORAL at 06:24

## 2023-08-23 RX ADMIN — HYDROCODONE BITARTRATE AND ACETAMINOPHEN 1 TABLET: 10; 325 TABLET ORAL at 08:25

## 2023-08-23 RX ADMIN — PANTOPRAZOLE SODIUM 40 MG: 40 TABLET, DELAYED RELEASE ORAL at 08:25

## 2023-08-23 RX ADMIN — AMLODIPINE BESYLATE 10 MG: 10 TABLET ORAL at 08:25

## 2023-08-23 RX ADMIN — INSULIN GLARGINE 6 UNITS: 100 INJECTION, SOLUTION SUBCUTANEOUS at 21:02

## 2023-08-23 RX ADMIN — Medication 1 TABLET: at 08:24

## 2023-08-23 RX ADMIN — LOPERAMIDE HYDROCHLORIDE 2 MG: 2 CAPSULE ORAL at 08:27

## 2023-08-23 RX ADMIN — HEPARIN SODIUM 5000 UNITS: 5000 INJECTION INTRAVENOUS; SUBCUTANEOUS at 08:25

## 2023-08-23 RX ADMIN — INSULIN LISPRO 3 UNITS: 100 INJECTION, SOLUTION INTRAVENOUS; SUBCUTANEOUS at 21:01

## 2023-08-23 RX ADMIN — ASPIRIN 81 MG: 81 TABLET, COATED ORAL at 08:25

## 2023-08-23 RX ADMIN — INSULIN LISPRO 2 UNITS: 100 INJECTION, SOLUTION INTRAVENOUS; SUBCUTANEOUS at 17:15

## 2023-08-23 RX ADMIN — INSULIN LISPRO 2 UNITS: 100 INJECTION, SOLUTION INTRAVENOUS; SUBCUTANEOUS at 11:46

## 2023-08-23 RX ADMIN — METOPROLOL SUCCINATE 25 MG: 25 TABLET, EXTENDED RELEASE ORAL at 08:24

## 2023-08-23 RX ADMIN — ISOSORBIDE MONONITRATE 30 MG: 30 TABLET, EXTENDED RELEASE ORAL at 06:24

## 2023-08-23 RX ADMIN — ROPINIROLE HYDROCHLORIDE 0.5 MG: 0.25 TABLET, FILM COATED ORAL at 08:25

## 2023-08-23 RX ADMIN — TRAZODONE HYDROCHLORIDE 50 MG: 50 TABLET ORAL at 21:23

## 2023-08-23 RX ADMIN — ATORVASTATIN CALCIUM 80 MG: 40 TABLET, FILM COATED ORAL at 21:23

## 2023-08-23 RX ADMIN — LEVOTHYROXINE SODIUM 25 MCG: 25 TABLET ORAL at 08:25

## 2023-08-23 NOTE — DISCHARGE SUMMARY
[facility] HOSPITALIST MEDICINE DISCHARGE SUMMARY    Patient Identification:  Name:  Reny Elena  Age:  63 y.o.  Sex:  female  :  1960  MRN:  [id number]  Visit Number:  [id number]    Date of Admission: 2023  Date of Discharge: 2023    PCP: Susan Stephens APRN    DISCHARGE DIAGNOSIS   End-stage renal disease on peritoneal dialysis  Essential hypertension  Type 2 diabetes mellitus  Multiple electrolyte abnormalities  Newly diagnosed hypothyroidism      CONSULTS  Dr. Sandy, Nephrology      PROCEDURES PERFORMED   None      HOSPITAL COURSE  Ms. Elena is a 63 y.o. female who presented to [facility] ED on 2023 with a chief complaint of hypertension and inability to perform peritoneal dialysis at home.  Patient has a past medical history remarkable for end-stage renal disease on peritoneal dialysis, type 2 diabetes mellitus, chronic HFpEF, essential hypertension and hyperlipidemia.  Patient reports that her life partner who cares for her at home was only admitted to the hospital and since that admission she had been unable to have peritoneal dialysis performed.  She reports not receiving peritoneal dialysis for 5 days prior to evaluation in the emergency department.  Secondary to inability to take care of herself at home as well as an inability to perform peritoneal dialysis at home, she did present to the emergency department for further treatment and evaluation.  Initial evaluation in the emergency department did consist of basic laboratory work as well as physical exam and vital signs.  Please see initial H&P for specific details.  After thorough evaluation, patient was admitted for further treatment and evaluation of end-stage renal disease in need of peritoneal dialysis with multiple electrolyte abnormalities.      When patient was admitted, nephrology services were consulted.  After thorough evaluation, nephrology services did arrange for peritoneal dialysis while  in the inpatient setting.  Unfortunately, patient's life partner remains hospitalized and unable to be discharged home to provide care for this patient in a home environment.  Conversations were held with patient's family regarding attempting to provide care at home.  Unfortunately, due to lack of resources and lack of available personnel, patient cannot be safely discharged home at this time.  Did discuss discharging patient to skilled nursing facility.  Unfortunately, this is not able to be arranged as all local SNF's are unable to perform PD.  As such, as patient does have significant physical debility, referral was made to inpatient rehab for consideration of admission.  After thorough evaluation, patient was accepted to inpatient rehab for continued PT/OT.  With this in mind, it is felt patient has reached maximum medical benefit of current hospitalization and will be discharged to inpatient rehab in stable condition today.  The beforementioned plan was thoroughly discussed with the patient and she expressed understanding and willingness to proceed with the beforementioned plan.    VITAL SIGNS:      08/20/23  0500 08/22/23  0500 08/23/23  0500   Weight: 56 kg (123 lb 6.4 oz) (new admit) 56.8 kg (125 lb 4.8 oz) 56.7 kg (124 lb 14.4 oz)     Body mass index is 21.44 kg/m².    PHYSICAL EXAM:  Constitutional: Chronically ill-appearing  female in no apparent distress.     HENT:  Head:  Normocephalic and atraumatic.  Mouth:  Moist mucous membranes.    Eyes:  Conjunctivae and EOM are normal.  Pupils are equal, round, and reactive to light.  No scleral icterus.    Neck:  Neck supple. No thyromegaly.  No JVD present.    Cardiovascular:  Regular rate and rhythm with no murmurs, rubs, clicks or gallops appreciated.  Pulmonary/Chest:  Clear to auscultation bilaterally with no crackles, wheezes or rhonchi appreciated.  Abdominal:  Soft. Nontender. Nondistended  Bowel sounds are normal in all four quadrants. No  organomegally appreciated.   Musculoskeletal:  No edema, no tenderness, and no deformity.  No red or swollen joints anywhere.    Neurological:  Alert, Cranial nerves II-XII intact with no focal deficits.  No facial droop.  No slurred speech.   Skin:  Warm and dry to palpation with no rashes or lesions appreciated.  Peripheral vascular:  2+ radial and pedal pulses in bilateral upper and lower extremities.  Psychiatric:  Alert and oriented x3, demonstrates appropriate judgment and insight.       DISCHARGE DISPOSITION   Stable    DISCHARGE MEDICATIONS:     Discharge Medications        Continue These Medications        Instructions Start Date   amLODIPine 10 MG tablet  Commonly known as: NORVASC   10 mg, Oral, Daily      aspirin 81 MG EC tablet   81 mg, Oral, Daily      atorvastatin 80 MG tablet  Commonly known as: LIPITOR   80 mg, Oral, Nightly      FLUoxetine 40 MG capsule  Commonly known as: PROzac   40 mg, Oral, Every Morning      fluticasone 50 MCG/ACT nasal spray  Commonly known as: FLONASE   2 sprays, Nasal, Daily PRN      gabapentin 300 MG capsule  Commonly known as: NEURONTIN   300 mg, Oral, Daily      HYDROcodone-acetaminophen  MG per tablet  Commonly known as: NORCO   1 tablet, Oral, 3 Times Daily PRN      hydrOXYzine 25 MG tablet  Commonly known as: ATARAX   25 mg, Oral, 3 Times Daily PRN      insulin aspart 100 UNIT/ML injection  Commonly known as: novoLOG   3 Units, Subcutaneous, 3 Times Daily Before Meals      isosorbide mononitrate 30 MG 24 hr tablet  Commonly known as: IMDUR   30 mg, Oral, Every Morning      Levemir FlexPen 100 UNIT/ML injection  Generic drug: insulin detemir   10 Units, Subcutaneous, Nightly      levothyroxine 25 MCG tablet  Commonly known as: SYNTHROID, LEVOTHROID   25 mcg, Oral, Daily      loperamide 2 MG tablet  Commonly known as: IMODIUM A-D   2 mg, Oral, Daily PRN      metoclopramide 5 MG tablet  Commonly known as: REGLAN   5 mg, Oral, 3 Times Daily Before Meals       metoprolol succinate XL 25 MG 24 hr tablet  Commonly known as: TOPROL-XL   25 mg, Oral, Daily      multivitamin tablet tablet   1 tablet, Oral, Daily      ondansetron ODT 4 MG disintegrating tablet  Commonly known as: ZOFRAN-ODT   4 mg, Translingual, Daily PRN      pantoprazole 40 MG EC tablet  Commonly known as: PROTONIX   40 mg, Oral, Daily      rOPINIRole 0.5 MG tablet  Commonly known as: REQUIP   0.5 mg, Oral, 2 Times Daily      tiZANidine 4 MG tablet  Commonly known as: ZANAFLEX   4 mg, Oral, Every 8 Hours PRN      traZODone 100 MG tablet  Commonly known as: DESYREL   100 mg, Oral, Nightly             Stop These Medications      hydrALAZINE 10 MG tablet  Commonly known as: APRESOLINE     lisinopril 20 MG tablet  Commonly known as: PRINIVIL,ZESTRIL              Diet Instructions    Resume diet as tolerated.           Activity Instructions    Resume activity as tolerated.          Follow-up Information       Susan Stephens APRN .    Specialty: Nurse Practitioner  Contact information:  [address] [place], [place] [address] [phone]                             TEST  RESULTS PENDING AT DISCHARGE       The ASCVD Risk score ([place] DK, et al., 2019) failed to calculate for the following reasons:    The patient has a prior MI or stroke diagnosis     Narayan Mayorga DO  08/23/23  15:35 EDT    Please note that this discharge summary required more than 30 minutes to complete.    Please send a copy of this dictation to the following providers:  Susan Stephens APRN

## 2023-08-23 NOTE — PLAN OF CARE
Goal Outcome Evaluation:     Patient resting in bed. No complaints of chest pain or shortness of breath. Patient complained of pain, PRN medication provided, see MAR. No visible indicators of acute distress noted.

## 2023-08-23 NOTE — PROGRESS NOTES
Nephrology Progress Note      Subjective     Patient is doing well no complaint    Objective       Vital signs :     Temp:  [97.6 °F (36.4 °C)-98.7 °F (37.1 °C)] 98 °F (36.7 °C)  Heart Rate:  [64-72] 71  Resp:  [16-18] 18  BP: ()/(53-76) 157/74    Intake/Output                         08/21/23 0701 - 08/22/23 0700 08/22/23 0701 - 08/23/23 0700     [id number] 4189-2904 Total 9819-5221 4396-3511 Total                 Intake    P.O.  960  620 1580  600  300 900    Total Intake  600 300 900       Output    Urine  400  125 525  --  -- --    Dialysis  --  -- --  252  -- 252    Total Output 400 125 525 252 -- 252             Physical Exam:    General Appearance : alert, pleasant, appears stated age, cooperative and alert  Lungs : clear to auscultation, respirations regular  Heart :  regular rhythm & normal rate, normal S1, S2 and no murmur, no rub  Abdomen : soft, non distended  Extremities : No edema,   Neurologic :   orientated to person, place, time and situation, Grossly no focal deficits        Laboratory Data :     Albumin No results found for: ALBUMIN     Magnesium No results found for: MG         PTH               No results found for: PTH    CBC and coagulation:  Results from last 7 days   Lab Units 08/20/23  0124 08/19/23  1109   WBC 10*3/mm3 5.94 5.95   HEMOGLOBIN g/dL 8.0* 10.1*   HEMATOCRIT % 25.3* 31.3*   MCV fL 94.4 92.6   MCHC g/dL 31.6 32.3   PLATELETS 10*3/mm3 120* 106*   INR   --  0.85*     Acid/base balance:      Renal and electrolytes:    Results from last 7 days   Lab Units 08/22/23  0139 08/21/23  0138 08/20/23  0124 08/19/23  1109   SODIUM mmol/L 134* 137 138 138   POTASSIUM mmol/L 3.7 3.2* 3.6 4.0   MAGNESIUM mg/dL  --   --   --  2.2   CHLORIDE mmol/L 97* 100 104 102   CO2 mmol/L 24.2 23.8 21.0* 22.2   BUN mg/dL 41* 43* 49* 55*   CREATININE mg/dL 4.97* 4.89* 4.89* 5.19*   CALCIUM mg/dL 6.8* 7.2* 7.1* 7.4*   PHOSPHORUS mg/dL  --   --   --  6.4*     Estimated Creatinine Clearance:  10.1 mL/min (A) (by C-G formula based on SCr of 4.97 mg/dL (H)).  @GFRCG:3@   Liver and pancreatic function:  Results from last 7 days   Lab Units 08/20/23  0124 08/19/23  1109   ALBUMIN g/dL 2.6* 3.3*   BILIRUBIN mg/dL 0.2 0.3   ALK PHOS U/L 63 82   AST (SGOT) U/L 11 16   ALT (SGPT) U/L 9 13         Cardiac:      Liver and pancreatic function:  Results from last 7 days   Lab Units 08/20/23  0124 08/19/23  1109   ALBUMIN g/dL 2.6* 3.3*   BILIRUBIN mg/dL 0.2 0.3   ALK PHOS U/L 63 82   AST (SGOT) U/L 11 16   ALT (SGPT) U/L 9 13       Medications :     amLODIPine, 10 mg, Oral, Daily  aspirin, 81 mg, Oral, Daily  atorvastatin, 80 mg, Oral, Nightly  FLUoxetine, 40 mg, Oral, QAM  gabapentin, 300 mg, Oral, Daily  heparin (porcine), 5,000 Units, Subcutaneous, Q12H  insulin glargine, 6 Units, Subcutaneous, Nightly  insulin lispro, 2-7 Units, Subcutaneous, 4x Daily AC & at Bedtime  isosorbide mononitrate, 30 mg, Oral, QAM  levothyroxine, 25 mcg, Oral, Daily  metoprolol succinate XL, 25 mg, Oral, Daily  multivitamin, 1 tablet, Oral, Daily  pantoprazole, 40 mg, Oral, Daily  rOPINIRole, 0.5 mg, Oral, BID  senna-docusate sodium, 2 tablet, Oral, BID  sodium chloride, 10 mL, Intravenous, Q12H  traZODone, 50 mg, Oral, Nightly             Assessment & Plan     1.  End-stage renal disease on peritoneal dialysis  2.  Type 2 diabetes with nephropathy  3.  Chronic diarrhea now worse  4.  New onset hypothyroidism  5.  Frailty  6.  Hypertension  7.  Malnutrition  8.  Diabetic neuropathy and myopathy with inability to ambulate independently  9.  Anemia of CKD    Patient can be discharged home from nephrology standpoint, continue on CCPD with 1.5% dialysate.  Reviewed PD data  Patient would like to continue on peritoneal dialysis for now and will let clinic know if nobody is able to assist her in nightly PD at home, to transition to hemodialysis  Follow-up iron studies      Nicholas Arroyo MD  08/23/23  07:05 EDT

## 2023-08-23 NOTE — THERAPY TREATMENT NOTE
Acute Care - Physical Therapy Treatment Note  [facility]     Patient Name: Reny Elena  : 1960  MRN: [id number]  Today's Date: 2023      Visit Dx:     ICD-10-CM ICD-9-CM   1. Chronic renal failure, unspecified CKD stage  N18.9 585.9   2. Self-care deficit  Z78.9 V49.89     Patient Active Problem List   Diagnosis    Tibia/fibula fracture    Iron deficiency anemia    Type 2 diabetes mellitus with hyperglycemia, with long-term current use of insulin    Wound of right ankle    Cellulitis    Metabolic encephalopathy    History of non-ST elevation myocardial infarction (NSTEMI)    HTN (hypertension)    CVA (cerebral vascular accident)    Chronic diastolic CHF (congestive heart failure)    Decubitus ulcer of coccyx, unspecified pressure ulcer stage    Depression    Expressive aphasia    Impaired mobility and ADLs    Hyperkalemia    Subdural hematoma    Severe malnutrition    Cerebrovascular accident (CVA), unspecified mechanism    PRES (posterior reversible encephalopathy syndrome)     Past Medical History:   Diagnosis Date    Arthritis     Closed fracture of right fibula and tibia 2018    Depression     Diabetic ulcer of left foot associated with type 2 diabetes mellitus 2017    Diastolic dysfunction     Disease of thyroid gland     Elevated cholesterol     End stage renal disease     Essential hypertension     GERD (gastroesophageal reflux disease)     History of transfusion     Hyperlipidemia     Iron deficiency anemia 2018    PAD (peripheral artery disease)     Renal failure     Type 2 diabetes mellitus with hyperglycemia, with long-term current use of insulin 2018     Past Surgical History:   Procedure Laterality Date    ABDOMINAL SURGERY      BACK SURGERY      Post spinal diskectomy, osteophytectomy in one lumbar interspace    CATARACT EXTRACTION      Left      SECTION      DENTAL PROCEDURE      ENDOSCOPY      FRACTURE SURGERY      right leg    HYSTERECTOMY      INCISION  AND DRAINAGE LEG Left 4/15/2017    Procedure: INCISION AND DRAINAGE LOWER EXTREMITY;  Surgeon: Basilio Morris MD;  Location: [facility] OR;  Service:     INCISION AND DRAINAGE LEG Left 5/26/2017    Procedure: Irrigation and Debridment abcess diabetic wound left foot with deep culture;  Surgeon: Basilio Morris MD;  Location: [facility] OR;  Service:     INSERTION PERITONEAL DIALYSIS CATHETER N/A 12/16/2021    Procedure: INSERTION PERITONEAL DIALYSIS CATHETER LAPAROSCOPIC;  Surgeon: Edy Glover MD;  Location: [facility] OR;  Service: General;  Laterality: N/A;    KNEE ARTHROSCOPY Right     ORIF TIBIA FRACTURE Right 12/28/2018    Procedure: TIBIAL  FRACTURE OPEN REDUCTION INTERNAL FIXATION;  Surgeon: Jung Barragan MD;  Location: [facility] OR;  Service: Orthopedics    TOE AMPUTATION Right     5th    TUBAL ABDOMINAL LIGATION       PT Assessment (last 12 hours)       PT Evaluation and Treatment       Row Name 08/23/23 1408          Physical Therapy Time and Intention    Document Type therapy note (daily note)  -KM     Mode of Treatment individual therapy;physical therapy  -KM     Patient Effort good  -KM     Symptoms Noted During/After Treatment fatigue  -KM       Row Name 08/23/23 1408          General Information    Patient Profile Reviewed yes  -KM     Patient Observations alert;cooperative;agree to therapy  -KM     Existing Precautions/Restrictions fall  -KM       Row Name 08/23/23 1408          Cognition    Affect/Mental Status (Cognition) WFL  -KM     Follows Commands (Cognition) WFL  -KM       Row Name 08/23/23 1408          Bed Mobility    Bed Mobility bed mobility (all) activities  -KM     All Activities, [place] (Bed Mobility) supervision  -KM     Assistive Device (Bed Mobility) bed rails;head of bed elevated  -KM       Row Name 08/23/23 1408          Transfers    Transfers sit-stand transfer;stand-sit transfer  -KM       Row Name 08/23/23 1408          Sit-Stand Transfer    Sit-Stand [place] (Transfers)  contact guard  -KM     Assistive Device (Sit-Stand Transfers) walker, front-wheeled  -KM       Row Name 08/23/23 1408          Stand-Sit Transfer    Stand-Sit [place] (Transfers) contact guard  -KM     Assistive Device (Stand-Sit Transfers) walker, front-wheeled  -KM       Row Name 08/23/23 1408          Gait/Stairs (Locomotion)    Gait/Stairs Locomotion gait/ambulation independence;gait/ambulation assistive device;distance ambulated  -KM     [place] Level (Gait) contact guard  -KM     Assistive Device (Gait) walker, front-wheeled  -KM     Distance in Feet (Gait) 50'  -KM     Pattern (Gait) step-through  -KM     Deviations/Abnormal Patterns (Gait) base of support, narrow;gait speed decreased  -KM       Row Name 08/23/23 1408          Safety Issues, Functional Mobility    Impairments Affecting Function (Mobility) balance;endurance/activity tolerance;strength  -KM       Row Name 08/23/23 1408          Progress Summary (PT)    Daily Progress Summary (PT) Pt. demonstrates improved functional mobility skills compared to prior session. She was able to increase ambulation distance w/ RW. She did not have any noticeable losses of balance while ambulating. Pt. would continue to benefit from skilled PT services.  -KM               User Key  (r) = Recorded By, (t) = Taken By, (c) = Cosigned By      Initials Name Provider Type    Dewayne Heard, PT Physical Therapist                    Physical Therapy Education       Title: PT OT SLP Therapies (Resolved)       Topic: Physical Therapy (Resolved)       Point: Mobility training (Resolved)       Learning Progress Summary             Patient Acceptance, E,TB, VU by KM at 8/21/2023 1527    Acceptance, E, VU by KJ at 8/19/2023 1810                         Point: Home exercise program (Resolved)       Learning Progress Summary             Patient Acceptance, E,TB, VU by KM at 8/21/2023 1527    Acceptance, E, VU by KJ at 8/19/2023 1810                         Point: Body  mechanics (Resolved)       Learning Progress Summary             Patient Acceptance, E,TB, VU by SHAYNE at 8/21/2023 1527    Acceptance, E, VU by KJ at 8/19/2023 1810                         Point: Precautions (Resolved)       Learning Progress Summary             Patient Acceptance, E,TB, VU by SHAYNE at 8/21/2023 1527    Acceptance, E, VU by KJ at 8/19/2023 1810                                         User Key       Initials Effective Dates Name Provider Type Discipline    SHAYNE 05/24/22 -  Dewayne Escamilla, PT Physical Therapist PT    SARAH 06/08/23 -  Alice Lagunas, RN Registered Nurse Nurse                  PT Recommendation and Plan  Anticipated Discharge Disposition (PT): other (see comments) (TBD based on pt. progress)  Planned Therapy Interventions (PT): balance training, bed mobility training, gait training, home exercise program, patient/family education, postural re-education, ROM (range of motion), strengthening, stretching, transfer training  Therapy Frequency (PT): 2 times/wk (2-5x/wk)  Progress Summary (PT)  Daily Progress Summary (PT): Pt. demonstrates improved functional mobility skills compared to prior session. She was able to increase ambulation distance w/ RW. She did not have any noticeable losses of balance while ambulating. Pt. would continue to benefit from skilled PT services.  Plan of Care Reviewed With: patient  Outcome Evaluation: Pt. evaluation completed during PT session. She was able to perform bed mobility w/ SBA. She was able to perform sit-stand-sit w/ Yusef-modA. She was unable to ambulate at time of evaluation d/t impaired balance. Pt. would benefit from skilled PT services.       Time Calculation:    PT Charges       Row Name 08/23/23 0719             Time Calculation    PT Received On 08/23/23  -KM         Time Calculation- PT    Total Timed Code Minutes- PT 15 minute(s)  -                User Key  (r) = Recorded By, (t) = Taken By, (c) = Cosigned By      Initials Name Provider Type      Dewayne Escamilla, PT Physical Therapist                  Therapy Charges for Today       Code Description Service Date Service Provider Modifiers Qty    79352099594 HC PT THERAPEUTIC ACT EA 15 MIN 8/22/2023 Dewayne Escamilla, PT GP 1    41121762882 HC GAIT TRAINING EA 15 MIN 8/22/2023 Dewayne Escamilla, PT GP 1    07391469797 HC GAIT TRAINING EA 15 MIN 8/23/2023 Dewayne Escamilla, PT GP 1            PT G-Codes  AM-PAC 6 Clicks Score (PT): 17    Dewayne Escamilla, PT  8/23/2023

## 2023-08-23 NOTE — SIGNIFICANT NOTE
08/23/23 1424   Post Acute Pre-Cert Documentation   Date Post Acute Pre-Cert Inititated per Facility 08/23/23   Verification from Payer Yes   Date Post Acute Pre-Cert Completed 08/23/23   Response Accepted   Post Acute Pre-Cert Initiated Comment Prior auth request for Rehab was called to [place] Medicaid and was approved for dates 08/23-08/29 with reference # [id number].

## 2023-08-23 NOTE — CASE MANAGEMENT/SOCIAL WORK
Discharge Planning Assessment  [facility]     Patient Name: Reny Elena  MRN: [id number]  Today's Date: 8/23/2023    Admit Date: 8/19/2023     Discharge Plan       Row Name 08/23/23 1528       Plan    Final Discharge Disposition Code 62 - inpatient rehab facility    Final Note Pt has been approved for  Inpatient Rehab. Pt agreeable for discharge to [facility] on this date.  notified pt's Shy [phone] of discharge. No other needs identified at this time.            ADRIAN JulianW

## 2023-08-23 NOTE — CASE MANAGEMENT/SOCIAL WORK
Discharge Planning Assessment  [facility]     Patient Name: Reny Elena  MRN: [id number]  Today's Date: 8/23/2023    Admit Date: 8/19/2023    Plan: Pt received a consult for  Inpatient Rehab on 8/22/23. SS followed up with  IPR per Yared who states he is reviewing pt's information and will contact SS with a decision. SS to follow.     Discharge Plan       Row Name 08/23/23 0956       Plan    Plan Pt received a consult for  Inpatient Rehab on 8/22/23. SS followed up with  IPR per Yared who states he is reviewing pt's information and will contact SS with a decision. SS to follow.            CAITY Julian

## 2023-08-23 NOTE — PROGRESS NOTES
[facility]     Patient Identification:  Name:  Reny Elena  Age:  63 y.o.  Sex:  female  :  1960  MRN:  [id number]  Visit Number:  [id number]  Primary Care Provider:  Susan Stephens APRN    Length of stay:  4    Chief complaint: None    Subjective:    Patient was asleep when I entered the room but easily awakened.  No new events overnight.  Patient still being considered for inpatient rehab.  ----------------------------------------------------------------------------------------------------------------------  [facility] Meds:  amLODIPine, 10 mg, Oral, Daily  aspirin, 81 mg, Oral, Daily  atorvastatin, 80 mg, Oral, Nightly  FLUoxetine, 40 mg, Oral, QAM  gabapentin, 300 mg, Oral, Daily  heparin (porcine), 5,000 Units, Subcutaneous, Q12H  insulin glargine, 6 Units, Subcutaneous, Nightly  insulin lispro, 2-7 Units, Subcutaneous, 4x Daily AC & at Bedtime  isosorbide mononitrate, 30 mg, Oral, QAM  levothyroxine, 25 mcg, Oral, Daily  metoprolol succinate XL, 25 mg, Oral, Daily  multivitamin, 1 tablet, Oral, Daily  pantoprazole, 40 mg, Oral, Daily  rOPINIRole, 0.5 mg, Oral, BID  senna-docusate sodium, 2 tablet, Oral, BID  sodium chloride, 10 mL, Intravenous, Q12H  traZODone, 50 mg, Oral, Nightly         ----------------------------------------------------------------------------------------------------------------------  Vital Signs:  Temp:  [97.6 °F (36.4 °C)-98.7 °F (37.1 °C)] 97.8 °F (36.6 °C)  Heart Rate:  [68-75] 75  Resp:  [18] 18  BP: (135-158)/(63-78) 136/67      23  0500 23  0500 23  0500   Weight: 56 kg (123 lb 6.4 oz) (new admit) 56.8 kg (125 lb 4.8 oz) 56.7 kg (124 lb 14.4 oz)     Body mass index is 21.44 kg/m².    Intake/Output Summary (Last 24 hours) at 2023 1359  Last data filed at 2023 1300  Gross per 24 hour   Intake 1380 ml   Output 495 ml   Net 885 ml       Diet: Regular/House Diet; Texture: Regular Texture (IDDSI  7); Fluid Consistency: Thin (IDDSI 0)  ----------------------------------------------------------------------------------------------------------------------  Physical exam:  Constitutional: Chronically ill-appearing  female in no apparent distress.     HENT:  Head:  Normocephalic and atraumatic.  Mouth:  Moist mucous membranes.    Eyes:  Conjunctivae and EOM are normal.  Pupils are equal, round, and reactive to light.  No scleral icterus.    Neck:  Neck supple. No thyromegaly.  No JVD present.    Cardiovascular:  Regular rate and rhythm with no murmurs, rubs, clicks or gallops appreciated.  Pulmonary/Chest:  Clear to auscultation bilaterally with no crackles, wheezes or rhonchi appreciated.  Abdominal:  Soft. Nontender. Nondistended  Bowel sounds are normal in all four quadrants. No organomegally appreciated.   Musculoskeletal:  No edema, no tenderness, and no deformity.  No red or swollen joints anywhere.    Neurological:  Alert, Cranial nerves II-XII intact with no focal deficits.  No facial droop.  No slurred speech.   Skin:  Warm and dry to palpation with no rashes or lesions appreciated.  Peripheral vascular:  2+ radial and pedal pulses in bilateral upper and lower extremities.  Psychiatric:  Alert and oriented x3, demonstrates appropriate judgment and insight.    No significant change in physical exam in comparison to 8/22/2023  -------------------------------------------------------------------------------------------------  ----------------------------------------------------------------------------------------------------------------------  Results from last 7 days   Lab Units 08/19/23  1414 08/19/23  1109   HSTROP T ng/L 150* 158*       Results from last 7 days   Lab Units 08/20/23  0124 08/19/23  1109   WBC 10*3/mm3 5.94 5.95   HEMOGLOBIN g/dL 8.0* 10.1*   HEMATOCRIT % 25.3* 31.3*   MCV fL 94.4 92.6   MCHC g/dL 31.6 32.3   PLATELETS 10*3/mm3 120* 106*   INR   --  0.85*           Results from  last 7 days   Lab Units 08/22/23  0139 08/21/23  0138 08/20/23  0124 08/19/23  1109   SODIUM mmol/L 134* 137 138 138   POTASSIUM mmol/L 3.7 3.2* 3.6 4.0   MAGNESIUM mg/dL  --   --   --  2.2   CHLORIDE mmol/L 97* 100 104 102   CO2 mmol/L 24.2 23.8 21.0* 22.2   BUN mg/dL 41* 43* 49* 55*   CREATININE mg/dL 4.97* 4.89* 4.89* 5.19*   CALCIUM mg/dL 6.8* 7.2* 7.1* 7.4*   GLUCOSE mg/dL 228* 209* 366* 264*   ALBUMIN g/dL  --   --  2.6* 3.3*   BILIRUBIN mg/dL  --   --  0.2 0.3   ALK PHOS U/L  --   --  63 82   AST (SGOT) U/L  --   --  11 16   ALT (SGPT) U/L  --   --  9 13     Estimated Creatinine Clearance: 10.1 mL/min (A) (by C-G formula based on SCr of 4.97 mg/dL (H)).    No results found for: AMMONIA      No results found for: BLOODCX  No results found for: URINECX  No results found for: WOUNDCX  No results found for: STOOLCX    I have personally looked at the labs and they are summarized above.  ----------------------------------------------------------------------------------------------------------------------  Imaging Results (Last 24 Hours)       ** No results found for the last 24 hours. **          ----------------------------------------------------------------------------------------------------------------------  Assessment and Plan:    ESRD on PD -continue peritoneal dialysis while inpatient    2.  Essential hypertension -well-controlled    3.  Type 2 diabetes mellitus -continue Accu-Cheks before every meal and nightly with sliding scale insulin    4.  Multiple electrolyte abnormalities -resolved with resumption of peritoneal dialysis    5.  Newly diagnosed hypothyroidism -continue Synthroid 25 mcg p.o. daily    Disposition currently being evaluated by inpatient rehab    Narayan Mayorga DO   08/23/23   13:59 EDT

## 2023-08-24 LAB
ALBUMIN SERPL-MCNC: 2.7 G/DL (ref 3.5–5.2)
ALBUMIN/GLOB SERPL: 1.1 G/DL
ALP SERPL-CCNC: 106 U/L (ref 39–117)
ALT SERPL W P-5'-P-CCNC: 14 U/L (ref 1–33)
ANION GAP SERPL CALCULATED.3IONS-SCNC: 12.5 MMOL/L (ref 5–15)
AST SERPL-CCNC: 22 U/L (ref 1–32)
BASOPHILS # BLD AUTO: 0.05 10*3/MM3 (ref 0–0.2)
BASOPHILS NFR BLD AUTO: 1 % (ref 0–1.5)
BILIRUB SERPL-MCNC: <0.2 MG/DL (ref 0–1.2)
BUN SERPL-MCNC: 45 MG/DL (ref 8–23)
BUN/CREAT SERPL: 9.4 (ref 7–25)
CALCIUM SPEC-SCNC: 7.4 MG/DL (ref 8.6–10.5)
CHLORIDE SERPL-SCNC: 96 MMOL/L (ref 98–107)
CO2 SERPL-SCNC: 24.5 MMOL/L (ref 22–29)
CREAT SERPL-MCNC: 4.78 MG/DL (ref 0.57–1)
DEPRECATED RDW RBC AUTO: 49.1 FL (ref 37–54)
EGFRCR SERPLBLD CKD-EPI 2021: 9.6 ML/MIN/1.73
EOSINOPHIL # BLD AUTO: 0.35 10*3/MM3 (ref 0–0.4)
EOSINOPHIL NFR BLD AUTO: 7.1 % (ref 0.3–6.2)
ERYTHROCYTE [DISTWIDTH] IN BLOOD BY AUTOMATED COUNT: 14.2 % (ref 12.3–15.4)
GLOBULIN UR ELPH-MCNC: 2.4 GM/DL
GLUCOSE BLDC GLUCOMTR-MCNC: 242 MG/DL (ref 70–130)
GLUCOSE BLDC GLUCOMTR-MCNC: 273 MG/DL (ref 70–130)
GLUCOSE BLDC GLUCOMTR-MCNC: 290 MG/DL (ref 70–130)
GLUCOSE BLDC GLUCOMTR-MCNC: 341 MG/DL (ref 70–130)
GLUCOSE SERPL-MCNC: 151 MG/DL (ref 65–99)
HCT VFR BLD AUTO: 27.3 % (ref 34–46.6)
HGB BLD-MCNC: 8.5 G/DL (ref 12–15.9)
IMM GRANULOCYTES # BLD AUTO: 0.06 10*3/MM3 (ref 0–0.05)
IMM GRANULOCYTES NFR BLD AUTO: 1.2 % (ref 0–0.5)
LYMPHOCYTES # BLD AUTO: 1.34 10*3/MM3 (ref 0.7–3.1)
LYMPHOCYTES NFR BLD AUTO: 27 % (ref 19.6–45.3)
MAGNESIUM SERPL-MCNC: 2 MG/DL (ref 1.6–2.4)
MCH RBC QN AUTO: 29.7 PG (ref 26.6–33)
MCHC RBC AUTO-ENTMCNC: 31.1 G/DL (ref 31.5–35.7)
MCV RBC AUTO: 95.5 FL (ref 79–97)
MONOCYTES # BLD AUTO: 0.47 10*3/MM3 (ref 0.1–0.9)
MONOCYTES NFR BLD AUTO: 9.5 % (ref 5–12)
NEUTROPHILS NFR BLD AUTO: 2.69 10*3/MM3 (ref 1.7–7)
NEUTROPHILS NFR BLD AUTO: 54.2 % (ref 42.7–76)
NRBC BLD AUTO-RTO: 0.4 /100 WBC (ref 0–0.2)
PLATELET # BLD AUTO: 141 10*3/MM3 (ref 140–450)
PMV BLD AUTO: 10.1 FL (ref 6–12)
POTASSIUM SERPL-SCNC: 4.1 MMOL/L (ref 3.5–5.2)
PROT SERPL-MCNC: 5.1 G/DL (ref 6–8.5)
RBC # BLD AUTO: 2.86 10*6/MM3 (ref 3.77–5.28)
SODIUM SERPL-SCNC: 133 MMOL/L (ref 136–145)
WBC NRBC COR # BLD: 4.96 10*3/MM3 (ref 3.4–10.8)

## 2023-08-24 PROCEDURE — 99223 1ST HOSP IP/OBS HIGH 75: CPT | Performed by: FAMILY MEDICINE

## 2023-08-24 PROCEDURE — 83735 ASSAY OF MAGNESIUM: CPT | Performed by: FAMILY MEDICINE

## 2023-08-24 PROCEDURE — 97530 THERAPEUTIC ACTIVITIES: CPT

## 2023-08-24 PROCEDURE — 97530 THERAPEUTIC ACTIVITIES: CPT | Performed by: OCCUPATIONAL THERAPIST

## 2023-08-24 PROCEDURE — 97161 PT EVAL LOW COMPLEX 20 MIN: CPT

## 2023-08-24 PROCEDURE — 97110 THERAPEUTIC EXERCISES: CPT

## 2023-08-24 PROCEDURE — 63710000001 INSULIN GLARGINE PER 5 UNITS: Performed by: INTERNAL MEDICINE

## 2023-08-24 PROCEDURE — 97167 OT EVAL HIGH COMPLEX 60 MIN: CPT | Performed by: OCCUPATIONAL THERAPIST

## 2023-08-24 PROCEDURE — 82948 REAGENT STRIP/BLOOD GLUCOSE: CPT

## 2023-08-24 PROCEDURE — 97116 GAIT TRAINING THERAPY: CPT

## 2023-08-24 PROCEDURE — 97110 THERAPEUTIC EXERCISES: CPT | Performed by: OCCUPATIONAL THERAPIST

## 2023-08-24 PROCEDURE — 85025 COMPLETE CBC W/AUTO DIFF WBC: CPT | Performed by: FAMILY MEDICINE

## 2023-08-24 PROCEDURE — 25010000002 HEPARIN (PORCINE) PER 1000 UNITS: Performed by: FAMILY MEDICINE

## 2023-08-24 PROCEDURE — 97535 SELF CARE MNGMENT TRAINING: CPT | Performed by: OCCUPATIONAL THERAPIST

## 2023-08-24 PROCEDURE — 80053 COMPREHEN METABOLIC PANEL: CPT | Performed by: FAMILY MEDICINE

## 2023-08-24 PROCEDURE — 63710000001 INSULIN LISPRO (HUMAN) PER 5 UNITS: Performed by: INTERNAL MEDICINE

## 2023-08-24 RX ORDER — HEPARIN SODIUM 5000 [USP'U]/ML
5000 INJECTION, SOLUTION INTRAVENOUS; SUBCUTANEOUS EVERY 12 HOURS SCHEDULED
Status: DISCONTINUED | OUTPATIENT
Start: 2023-08-24 | End: 2023-08-31

## 2023-08-24 RX ADMIN — LOPERAMIDE HYDROCHLORIDE 2 MG: 2 CAPSULE ORAL at 19:02

## 2023-08-24 RX ADMIN — ASPIRIN 81 MG: 81 TABLET, COATED ORAL at 08:44

## 2023-08-24 RX ADMIN — ATORVASTATIN CALCIUM 80 MG: 40 TABLET, FILM COATED ORAL at 20:39

## 2023-08-24 RX ADMIN — METOPROLOL SUCCINATE 25 MG: 25 TABLET, EXTENDED RELEASE ORAL at 08:44

## 2023-08-24 RX ADMIN — Medication 1 TABLET: at 08:44

## 2023-08-24 RX ADMIN — LEVOTHYROXINE SODIUM 25 MCG: 25 TABLET ORAL at 08:44

## 2023-08-24 RX ADMIN — INSULIN LISPRO 4 UNITS: 100 INJECTION, SOLUTION INTRAVENOUS; SUBCUTANEOUS at 11:49

## 2023-08-24 RX ADMIN — HYDROCODONE BITARTRATE AND ACETAMINOPHEN 1 TABLET: 10; 325 TABLET ORAL at 20:38

## 2023-08-24 RX ADMIN — INSULIN LISPRO 4 UNITS: 100 INJECTION, SOLUTION INTRAVENOUS; SUBCUTANEOUS at 16:58

## 2023-08-24 RX ADMIN — FLUOXETINE HYDROCHLORIDE 40 MG: 20 CAPSULE ORAL at 08:44

## 2023-08-24 RX ADMIN — INSULIN LISPRO 3 UNITS: 100 INJECTION, SOLUTION INTRAVENOUS; SUBCUTANEOUS at 08:45

## 2023-08-24 RX ADMIN — ROPINIROLE HYDROCHLORIDE 0.5 MG: 0.25 TABLET, FILM COATED ORAL at 08:45

## 2023-08-24 RX ADMIN — INSULIN GLARGINE 6 UNITS: 100 INJECTION, SOLUTION SUBCUTANEOUS at 20:41

## 2023-08-24 RX ADMIN — AMLODIPINE BESYLATE 10 MG: 10 TABLET ORAL at 08:44

## 2023-08-24 RX ADMIN — HYDROCODONE BITARTRATE AND ACETAMINOPHEN 1 TABLET: 10; 325 TABLET ORAL at 08:45

## 2023-08-24 RX ADMIN — HEPARIN SODIUM 5000 UNITS: 5000 INJECTION INTRAVENOUS; SUBCUTANEOUS at 11:49

## 2023-08-24 RX ADMIN — PANTOPRAZOLE SODIUM 40 MG: 40 TABLET, DELAYED RELEASE ORAL at 08:44

## 2023-08-24 RX ADMIN — INSULIN LISPRO 5 UNITS: 100 INJECTION, SOLUTION INTRAVENOUS; SUBCUTANEOUS at 20:40

## 2023-08-24 RX ADMIN — HEPARIN SODIUM 5000 UNITS: 5000 INJECTION INTRAVENOUS; SUBCUTANEOUS at 20:39

## 2023-08-24 RX ADMIN — ROPINIROLE HYDROCHLORIDE 0.5 MG: 0.25 TABLET, FILM COATED ORAL at 20:39

## 2023-08-24 RX ADMIN — TRAZODONE HYDROCHLORIDE 50 MG: 50 TABLET ORAL at 20:39

## 2023-08-24 NOTE — PROGRESS NOTES
"Patient Assessment Instrument  Quality Indicators - Admission FY 2023    Section A. Ethnicity/ Race/Language  Ethnicity:  Not of , /a, German Origin  Race:  White  Preferred Language:  english  Requests  to Communicate:   No    Section A. Transportation      Section B. Hearing and Vision  Expression of Ideas and Wants: Expresses complex messages without difficulty and  with speech that is clear and easy to understand.  Understanding Verbal and Non-Verbal Content: Understands: Clear comprehension  without cues or repetitions.  Ability to Hear:  Adequate - no difficulty in normal conversation, social  interaction, listening to TV  Ability to See in Adequate Light:  Impaired - sees large print, but not regular  print in newspapers/books    Section B. Health Literacy  Frequency of Needing Assistance Reading:  Rarely      Section C. Cognitive Patterns  Brief Interview for Mental Status (BIMS) was conducted.  Repetition of Three Words: Three words  Able to report correct year: Correct  Able to report correct month: Accurate within 5 days  Able to report correct day of the week: Correct  Able to recall \"sock\": Yes, no cue required  Able to recall \"blue\": Yes, no cue required  Able to recall \"bed\": Yes, no cue required    BIMS SUMMARY SCORE: 15 Cognitively intact Patient was able to complete the Brief  Interview for Mental Status    Section C. Signs and Symptoms of Delirium (from CAM)  Evidence of Acute Change in Mental Status:   No  Inattention: Behavior not present  Thinking Disorganized or Incoherent:   Behavior not present  Altered Level of Consciousness:   Behavior not present    Section D. Mood  Presence of little interest or pleasure in doing things:   No  Frequency of having little interest or pleasure in doing things:   Never or 1  day  Presence of feeling down, depressed, or hopeless:   No  Frequency of feeling down, depressed, or hopeless:   Never or 1 day   Interview Ended. Above " responses do not meet criteria to continue.  Total Severity Score:   00    Section D. Social Isolation  Frequecy of Feeling Lonely or Isolated:  Never    Section DK0207. Prior Functioning      Section [id number]. Prior Device Use      Section UJ8515. Self Care Performance      Section GA3552. Self Care Discharge Goals      Section [id number]. Mobility Performance      Section GM4439. Mobility Discharge Goals      Section H. Bladder and Bowel      Section I. Active Diagnosis      Section J. Health Conditions      Section J. Health Conditions (Pain)  Pain Effect on Sleep:   Rarely or not at all  Pain Interference with Therapy Activities:   Rarely or not at all  Pain Interference with Day-to-Day Activities:   Rarely or not at all    Section K. Swallowing/Nutritional Status    Nutritional Approaches on Admission:    Section M. Skin Conditions  Unhealed Pressure Ulcer/Injuries at Stage 1 or Higher on Admission:  Yes.  Number of Unhealed Stage 1 Pressure Injuries: 0  Number of Unhealed Stage 2: 1  Number of Unhealed Stage 3: 0  Number of Unhealed Stage 4: 0  Number of Unhealed Unstageable Due to Non-removable Dressing/Device: 0  Number of Unhealed Unstageable Due to Slough/Eschar: 0  Number of Unhealed Unstageable Pressure Injuries Presenting as Deep Tissue  Injury: 1    Section N. Medication    Potential Clinically Significant Medication Issues: No issues found during  review  Patient is taking medications in the following pharmacological classification:  J. Hypoglycemic (including insulin) An indication is noted for all medications  in the Hypoglycemic drug class.  Potential Clinically Significant Medication Issues: No issues found during  review    Section O. Special Treatments, Procedures, and Programs  Dialysis: Peritoneal dialysis    Signed by: Nurse Bety

## 2023-08-24 NOTE — PLAN OF CARE
Goal Outcome Evaluation:  Plan of Care Reviewed With: patient        Progress: improving       Problem: Rehabilitation (IRF) Plan of Care  Goal: Plan of Care Review  Outcome: Ongoing, Progressing  Flowsheets (Taken 8/24/2023 0932)  Progress: improving  Plan of Care Reviewed With: patient  Goal: Patient-Specific Goal (Individualized)  Outcome: Ongoing, Progressing  Goal: Absence of New-Onset Illness or Injury  Outcome: Ongoing, Progressing  Intervention: Prevent Fall and Fall Injury  Recent Flowsheet Documentation  Taken 8/24/2023 0800 by Teo Ventura RN  Safety Promotion/Fall Prevention: safety round/check completed  Intervention: Prevent Infection  Recent Flowsheet Documentation  Taken 8/24/2023 0800 by Teo Ventura RN  Infection Prevention:   hand hygiene promoted   rest/sleep promoted  Intervention: Prevent VTE (Venous Thromboembolism)  Recent Flowsheet Documentation  Taken 8/24/2023 0800 by Teo Ventura RN  VTE Prevention/Management: (see MAR) other (see comments)  Goal: Optimal Comfort and Wellbeing  Outcome: Ongoing, Progressing  Goal: Home and Community Transition Plan Established  Outcome: Ongoing, Progressing     Problem: Fall Injury Risk  Goal: Absence of Fall and Fall-Related Injury  Outcome: Ongoing, Progressing  Intervention: Identify and Manage Contributors  Recent Flowsheet Documentation  Taken 8/24/2023 0800 by Teo Ventura RN  Medication Review/Management: medications reviewed  Intervention: Promote Injury-Free Environment  Recent Flowsheet Documentation  Taken 8/24/2023 0800 by Teo Ventura RN  Safety Promotion/Fall Prevention: safety round/check completed     Problem: Skin Injury Risk Increased  Goal: Skin Health and Integrity  Outcome: Ongoing, Progressing  Intervention: Optimize Skin Protection  Recent Flowsheet Documentation  Taken 8/24/2023 0800 by Teo Ventura RN  Pressure Reduction Techniques:   frequent weight shift encouraged   heels elevated off bed   weight shift assistance  provided  Pressure Reduction Devices:   pressure-redistributing mattress utilized   positioning supports utilized   heel offloading device utilized  Skin Protection:   adhesive use limited   incontinence pads utilized     Problem: Hypertension Comorbidity  Goal: Blood Pressure in Desired Range  Outcome: Ongoing, Progressing  Intervention: Maintain Blood Pressure Management  Recent Flowsheet Documentation  Taken 8/24/2023 0800 by Teo Ventura RN  Medication Review/Management: medications reviewed

## 2023-08-24 NOTE — PROGRESS NOTES
Inpatient Rehabilitation Functional Measures Assessment and Plan of Care    Plan of Care      Functional Measures  LINO Eating:  LINO Grooming:  LINO Bathing:  LINO Upper Body Dressing:  LINO Lower Body Dressing:  LINO Toileting:    LINO Bladder Management  Level of Assistance:  Frequency/Number of Accidents this Shift:    LINO Bowel Management  Level of Assistance:  Frequency/Number of Accidents this Shift:    LINO Bed/Chair/Wheelchair Transfer:  Bed/chair/wheelchair Transfer Score = 4.  Patient performs 75% or more of effort and minimal assistance (little/incidental  help/lifting of one limb/steadying) for transferring to and from the  bed/chair/wheelchair, requiring: Patient requires the following assistive  device(s): Walker. Seating system of wheelchair.  LINO Toilet Transfer:  LINO Tub/Shower Transfer:    Previously Documented Mode of Locomotion at Discharge:  LINO Expected Mode of Locomotion at Discharge: The expected mode of most  frequently used locomotion, at discharge, is expected to be walking.  LINO Walk/Wheelchair:  WHEELCHAIR OBSERVATION   Wheelchair did not occur.    WALK OBSERVATION   Walk Distance Scale = 2.  Distance walked is 50 -149 feet. Walk Score = 2.  Patient performs 75% or more of effort and requires minimal assistance.  Incidental assistance, contact guard or steadying was provided. Patient walked a  distance of 70 feet. Patient requires the following assistive device(s): Rolling  walker.  LINO Stairs:  Stairs did not occur.    LINO Comprehension:  LINO Expression:  LINO Social Interaction:  LINO Problem Solving:  LINO Memory:    Therapy Mode Minutes  Occupational Therapy:  Physical Therapy: Individual: 90 minutes.  Speech Language Pathology:    Discharge Functional Goals:  Bed, Chair, Wheelchair Transfers: 6  Walk: 5    Signed by: Christel Ferguson, Physical Therapist

## 2023-08-24 NOTE — CONSULTS
Adult Nutrition  Assessment/PES    Patient Name:  Reny Elena  YOB: 1960  MRN: [id number]  Admit Date:  8/23/2023    Assessment Date:  8/24/2023    Comments:  Will modify diet to include Consistent carb diet.  Patient has ONS ordered BID. Will follow up with educational needs.     Reason for Assessment       Row Name 08/24/23 1537          Reason for Assessment    Reason For Assessment per organizational policy;physician consult     Diagnosis other (see comments)  debility     Identified At Risk by Screening Criteria no indicators present                      Labs/Tests/Procedures/Meds       Row Name 08/24/23 1536          Labs/Procedures/Meds    Lab Results Reviewed reviewed     Lab Results Comments glu-273; phos-6.4; BUN-45; Cr-4.78        Medications    Pertinent Medications Reviewed reviewed     Pertinent Medications Comments mvi                    Physical Findings       Row Name 08/24/23 1539          Physical Findings    Overall Physical Appearance patient presents with debility and BMI 21.28. Patient has ESRD with PD, has wound on heal and gluteal, DM, CHF, GERD, hypertension                    Estimated/Assessed Needs - Anthropometrics       Row Name 08/24/23 1538          Anthropometrics    Age for Calculations 63     Weight for Calculation 56.2 kg (123 lb 14.4 oz)     Additional Documentation Ideal Body Weight (IBW) (Group)        Ideal Body Weight (IBW)    Ideal Body Weight 54.7kg        Estimated/Assessed Needs    Additional Documentation KCAL/KG (Group)        KCAL/KG    KCAL/KG 25 Kcal/Kg (kcal);35 Kcal/Kg (kcal)     25 Kcal/Kg (kcal) 1405     35 Kcal/Kg (kcal) 1967        Protein Requirements    Weight Used For Protein Calculations 56.2 kg (123 lb 14.4 oz)     Est Protein Requirement Amount (gms/kg) 1.3 gm protein     Estimated Protein Requirements (gms/day) 73.06        Fluid Requirements    Fluid Requirements (mL/day) --  750-1500                    Nutrition Prescription Ordered        Row Name 08/24/23 1541          Nutrition Prescription PO    Current PO Diet Regular     Fluid Consistency Thin     Supplement Novasource Renal (Nepro)     Supplement Frequency 2 times a day                    Evaluation of Received Nutrient/Fluid Intake       Row Name 08/24/23 1542          Fluid Intake Evaluation    Total Fluid Target (mL) 1500     Oral Fluid (mL) 240     Enteral Fluid (mL) 0     Parenteral Fluid (mL) 0     Other Fluid (mL) 0     Total Fluid Intake (mL) 240     % Total Fluid Intake 16        PO Evaluation    Number of Meals 8     % PO Intake 75                       Problem/Interventions:   Problem 1       Row Name 08/24/23 1543          Nutrition Diagnoses Problem 1    Problem 1 Decreased Nutrient Needs     Macronutrient Carbohydrate     Etiology (related to) Medical Diagnosis     Endocrine DM     Renal Peritoneal dialysis     Signs/Symptoms (evidenced by) Report/Observation     Reported/Observed By Patient;RN;MD     Appetite Good;Improved                          Intervention Goal       Row Name 08/24/23 1545          Intervention Goal    General Meet nutritional needs for age/condition;Provide information regarding MNT for treatment/condition     PO Maintain intake                    Nutrition Intervention       Row Name 08/24/23 1545          Nutrition Intervention    RD/Tech Action Recommend/ordered;Follow Tx progress     Recommended/Ordered Diet                    Nutrition Prescription       Row Name 08/24/23 1546          Nutrition Prescription PO    PO Prescription Begin/change diet     Begin/Change Diet to Regular     Fluid Consistency Thin     Common Modifiers Consistent Carbohydrate     New PO Prescription Ordered? Yes                    Education/Evaluation       Row Name 08/24/23 1546          Monitor/Evaluation    Monitor Per protocol;PO intake;Supplement intake;Pertinent labs;Weight;Skin status     Education Follow-up Other (comment)  continue to follow                      Electronically signed by:  Lory Jeff RD  08/24/23 15:48 EDT

## 2023-08-24 NOTE — PROGRESS NOTES
Inpatient Rehabilitation Functional Measures Assessment and Plan of Care    Plan of Care  Self Care    [OT] Toileting(Active)  Current Status(08/24/2023): Mod A  Weekly Goal(08/29/2023): Min A  Discharge Goal: CGA    Performed Intervention(s)  ADL retraining, AE education, GMC/FMC theract, strengthening, fxal mobility    Functional Measures  LINO Eating:  LINO Grooming:  LINO Bathing:  LINO Upper Body Dressing:  LINO Lower Body Dressing:  LINO Toileting:    LINO Bladder Management  Level of Assistance:  Frequency/Number of Accidents this Shift:    LINO Bowel Management  Level of Assistance:  Frequency/Number of Accidents this Shift:    LINO Bed/Chair/Wheelchair Transfer:  LINO Toilet Transfer:  LINO Tub/Shower Transfer:    Previously Documented Mode of Locomotion at Discharge:  LINO Expected Mode of Locomotion at Discharge:  LINO Walk/Wheelchair:  LINO Stairs:    LINO Comprehension:  LINO Expression:  LINO Social Interaction:  LINO Problem Solving:  LINO Memory:    Therapy Mode Minutes  Occupational Therapy: Individual: 90 minutes.  Physical Therapy:  Speech Language Pathology:    Discharge Functional Goals:    Signed by: Yael Mendez OT

## 2023-08-24 NOTE — PROGRESS NOTES
Rehabilitation Nursing  Inpatient Rehabilitation Plan of Care Note    Plan of Care  Copy from POC    Safety    Potential for Injury (Active)  Current Status (8/23/2023 7:00:00 PM): NO FALLS  Weekly Goal: NO FALLS  Discharge Goal: NO FALLS    Body Systems    Integumentary (Active)  Current Status (8/23/2023 7:00:00 PM): NO FURTHER BREAKDOWN  Weekly Goal: NO FURTHER BREAKDOWN  Discharge Goal: NO FURTHER BREAKDOWN    Signed by: Tonya Aggarwal Nurse

## 2023-08-24 NOTE — PROGRESS NOTES
Rehabilitation Nursing  Inpatient Rehabilitation Plan of Care Note    Plan of Care  Safety    Potential for Injury (Active)  Current Status (8/23/2023 7:00:00 PM): NO FALLS  Weekly Goal: NO FALLS  Discharge Goal: NO FALLS    Body Systems    Integumentary (Active)  Current Status (8/23/2023 7:00:00 PM): NO FURTHER BREAKDOWN  Weekly Goal: NO FURTHER BREAKDOWN  Discharge Goal: NO FURTHER BREAKDOWN    Signed by: Teo Ventura RN

## 2023-08-24 NOTE — PLAN OF CARE
Goal Outcome Evaluation:  Plan of Care Reviewed With: patient        Progress: improving  Outcome Evaluation: BED ALARM NOTED; PD IN PROGRESS; PROGRESSING WITH CURRENT THERAPIES; CONTINUE PLAN OF CARE; MONITOR

## 2023-08-24 NOTE — PROGRESS NOTES
Nephrology Progress Note      Subjective     No chest pain, shortness of breath    Objective       Vital signs :     Temp:  [97.8 °F (36.6 °C)-98.4 °F (36.9 °C)] 98.2 °F (36.8 °C)  Heart Rate:  [70-75] 70  Resp:  [14-18] 18  BP: (128-162)/(64-81) 162/81    Intake/Output                   08/23/23 0701 - 08/24/23 0700     3320-7414 2302-4293 Total              Intake    P.O.  --  120 120    Total Intake -- 120 120       Output    Dialysis  --  685 685    Total Output -- 685 685             Physical Exam:    General Appearance : alert, pleasant, appears stated age, cooperative and alert  Lungs : clear to auscultation, respirations regular  Heart :  regular rhythm & normal rate, normal S1, S2 and no murmur, no rub  Abdomen : soft, non distended  Extremities : No edema,   Neurologic :   orientated to person, place, time and situation, Grossly no focal deficits        Laboratory Data :     Albumin Albumin   Date Value Ref Range Status   08/24/2023 2.7 (L) 3.5 - 5.2 g/dL Final        Magnesium Magnesium   Date Value Ref Range Status   08/24/2023 2.0 1.6 - 2.4 mg/dL Final            PTH               No results found for: PTH    CBC and coagulation:  Results from last 7 days   Lab Units 08/24/23  0100 08/20/23  0124 08/19/23  1109   WBC 10*3/mm3 4.96 5.94 5.95   HEMOGLOBIN g/dL 8.5* 8.0* 10.1*   HEMATOCRIT % 27.3* 25.3* 31.3*   MCV fL 95.5 94.4 92.6   MCHC g/dL 31.1* 31.6 32.3   PLATELETS 10*3/mm3 141 120* 106*   INR   --   --  0.85*     Acid/base balance:      Renal and electrolytes:    Results from last 7 days   Lab Units 08/24/23  0100 08/22/23  0139 08/21/23  0138 08/20/23  0124 08/19/23  1109   SODIUM mmol/L 133* 134* 137 138 138   POTASSIUM mmol/L 4.1 3.7 3.2* 3.6 4.0   MAGNESIUM mg/dL 2.0  --   --   --  2.2   CHLORIDE mmol/L 96* 97* 100 104 102   CO2 mmol/L 24.5 24.2 23.8 21.0* 22.2   BUN mg/dL 45* 41* 43* 49* 55*   CREATININE mg/dL 4.78* 4.97* 4.89* 4.89* 5.19*   CALCIUM mg/dL 7.4* 6.8* 7.2* 7.1* 7.4*   PHOSPHORUS  mg/dL  --   --   --   --  6.4*     Estimated Creatinine Clearance: 10.4 mL/min (A) (by C-G formula based on SCr of 4.78 mg/dL (H)).  @GFRCG:3@   Liver and pancreatic function:  Results from last 7 days   Lab Units 08/24/23  0100 08/20/23  0124 08/19/23  1109   ALBUMIN g/dL 2.7* 2.6* 3.3*   BILIRUBIN mg/dL <0.2 0.2 0.3   ALK PHOS U/L 106 63 82   AST (SGOT) U/L 22 11 16   ALT (SGPT) U/L 14 9 13         Cardiac:      Liver and pancreatic function:  Results from last 7 days   Lab Units 08/24/23  0100 08/20/23  0124 08/19/23  1109   ALBUMIN g/dL 2.7* 2.6* 3.3*   BILIRUBIN mg/dL <0.2 0.2 0.3   ALK PHOS U/L 106 63 82   AST (SGOT) U/L 22 11 16   ALT (SGPT) U/L 14 9 13       Medications :     amLODIPine, 10 mg, Oral, Daily  aspirin, 81 mg, Oral, Daily  atorvastatin, 80 mg, Oral, Nightly  FLUoxetine, 40 mg, Oral, Daily  gabapentin, 300 mg, Oral, Daily  insulin glargine, 6 Units, Subcutaneous, Nightly  insulin lispro, 2-7 Units, Subcutaneous, 4x Daily AC & at Bedtime  isosorbide mononitrate, 30 mg, Oral, Daily  levothyroxine, 25 mcg, Oral, Daily  metoprolol succinate XL, 25 mg, Oral, Daily  multivitamin, 1 tablet, Oral, Daily  pantoprazole, 40 mg, Oral, Daily  rOPINIRole, 0.5 mg, Oral, BID  traZODone, 50 mg, Oral, Nightly             Assessment & Plan     1.  End-stage renal disease on peritoneal dialysis  2.  Type 2 diabetes with nephropathy  3.  Chronic diarrhea now worse  4.  New onset hypothyroidism  5.  Frailty  6.  Hypertension  7.  Malnutrition  8.  Diabetic neuropathy and myopathy with inability to ambulate independently  9.  Anemia of CKD    Continue on CCPD while inpatient with 1.5% dialysate.    Patient would like to continue on peritoneal dialysis for now and will let clinic know if nobody is able to assist her in nightly PD at home, to transition to hemodialysis  Follow-up iron studies      Nicholas Arroyo MD  08/24/23  08:28 EDT

## 2023-08-24 NOTE — PLAN OF CARE
Goal Outcome Evaluation:  Plan of Care Reviewed With: patient        Progress: no change  Outcome Evaluation: NEW ADMIT TO REHAB UNIT

## 2023-08-24 NOTE — NURSING NOTE
NOTIFIED [facility] RN OF PATIENT'S ARRIVAL TO REHAB REGARDING HER PERITONEAL DIALYSIS

## 2023-08-24 NOTE — H&P
[facility] HOSPITALIST HISTORY AND PHYSICAL    Patient Identification:  Name:  Reny Elena  Age:  63 y.o.  Sex:  female  :  1960  MRN:  [id number]   Visit Number:  [id number]  Room number:  106/2S  Primary Care Physician:  Susan Stephens APRN     Subjective     2023   Chief complaint:  No chief complaint on file.      History of presenting illness:  63 y.o. female  This pt is seen today for post admission physician evaluation to the inpatient rehabilitation facility.  Patient is a 63-year-old female with a history of multiple medical issues including end-stage renal disease, diabetes mellitus, depression, osteoarthritis, hypothyroidism, CHF preserved EF, GERD, hypertension, dyslipidemia, anemia.  Patient is also had multiple surgeries in the [place] in the past and states she has had multiple fractures in the right leg and is required multiple surgeries to fixate the leg.  Patient states she does have chronic pain as a result of the leg issues.  Patient was brought into the hospital initially secondary to multiple missed courses of her peritoneal dialysis.  Patient became extremely weak and was brought in and has been getting her dialysis and also working to control her blood pressure but again patient is generally weak having difficulty ambulating at this time.  Consequently because of her multiple medical issues and debility it was thought would be a good candidate for inpatient physical rehab      Patient continued to progress medically.  Physical therapy and Occupational therapy evaluations were completed with recommended acute inpatient rehabilitation referral for continued functional mobility intervention and reeducation.  Acute rehab referral ordered for continued medical monitoring and management post prolonged hospitalization, continued respiratory status monitoring, lab monitoring, pain mgt needs, bowel/bladder care with new medication education, skin monitoring and  breakdown prevention along with ongoing medical comorbidities that require ongoing care.    The preadmission mini-FIM score as assessed by the referring facility as follows: Eating 6; grooming 3; bathing 3; dressing upper body 3; dressing lower body 3; toileting 3; transfers to bed chair and wheelchair 5; transfers to toilet 5; locomotion 5; bladder management 3; bowel management 3; comprehension 7; expression 7; substractions 7; problem solving 7; memory 7.  Estimated length of stay 14 days    ---------------------------------------------------------------------------------------------------------------------   Review of Systems:  General: Complains of generalized weakness, no fever no chills  HEENT: Negative  Heart: History of CHF preserved EF  Lungs: Denies cough denies wheezing  Abdomen: No nausea vomiting or diarrhea.  History of GERD  : End-stage renal disease, requires peritoneal dialysis  Musculoskeletal: Chronic right lower extremity pain with history of multiple fractures.  Patient states that she has had issues in the past with the left knee being dislocated.  Neuro: Generalized weakness; history of CVA in the past but complained of generalized weakness afterwards  Skin: Negative  ---------------------------------------------------------------------------------------------------------------------   Past Medical History:   Diagnosis Date    Arthritis     Closed fracture of right fibula and tibia 12/25/2018    Depression     Diabetic ulcer of left foot associated with type 2 diabetes mellitus 6/23/2017    Diastolic dysfunction     Disease of thyroid gland     Elevated cholesterol     End stage renal disease     Essential hypertension     GERD (gastroesophageal reflux disease)     History of transfusion     Hyperlipidemia     Iron deficiency anemia 12/30/2018    PAD (peripheral artery disease)     Renal failure     Type 2 diabetes mellitus with hyperglycemia, with long-term current use of insulin  2018     Past Surgical History:   Procedure Laterality Date    ABDOMINAL SURGERY      BACK SURGERY      Post spinal diskectomy, osteophytectomy in one lumbar interspace    CATARACT EXTRACTION      Left      SECTION      DENTAL PROCEDURE      ENDOSCOPY      FRACTURE SURGERY      right leg    HYSTERECTOMY      INCISION AND DRAINAGE LEG Left 4/15/2017    Procedure: INCISION AND DRAINAGE LOWER EXTREMITY;  Surgeon: Basilio Morris MD;  Location: [facility] OR;  Service:     INCISION AND DRAINAGE LEG Left 2017    Procedure: Irrigation and Debridment abcess diabetic wound left foot with deep culture;  Surgeon: Basilio Morris MD;  Location:  [facility] OR;  Service:     INSERTION PERITONEAL DIALYSIS CATHETER N/A 2021    Procedure: INSERTION PERITONEAL DIALYSIS CATHETER LAPAROSCOPIC;  Surgeon: Edy Glover MD;  Location:  [facility] OR;  Service: General;  Laterality: N/A;    KNEE ARTHROSCOPY Right     ORIF TIBIA FRACTURE Right 2018    Procedure: TIBIAL  FRACTURE OPEN REDUCTION INTERNAL FIXATION;  Surgeon: Jung Barragan MD;  Location:  [facility] OR;  Service: Orthopedics    TOE AMPUTATION Right     5th    TUBAL ABDOMINAL LIGATION       Family History   Problem Relation Age of Onset    Heart disease Mother     Cancer Mother     COPD Mother     Diabetes Other     Depression Other      Social History     Socioeconomic History    Marital status:    Tobacco Use    Smoking status: Never    Smokeless tobacco: Never   Vaping Use    Vaping Use: Never used   Substance and Sexual Activity    Alcohol use: No    Drug use: No    Sexual activity: Defer     ---------------------------------------------------------------------------------------------------------------------   Allergies:  Penicillins, Codeine, and Sulfa antibiotics  ---------------------------------------------------------------------------------------------------------------------   Medications below are reported home medications pulling from  within the system; at this time, these medications have not been reconciled unless otherwise specified and are in the verification process for further verifcation as current home medications.    Prior to Admission Medications       Prescriptions Last Dose Informant Patient Reported? Taking?    amLODIPine (NORVASC) 10 MG tablet Unknown  Yes No    Take 1 tablet by mouth Daily.    aspirin 81 MG EC tablet Unknown Pharmacy No No    Take 1 tablet by mouth Daily.    atorvastatin (LIPITOR) 80 MG tablet Unknown Pharmacy No No    Take 1 tablet by mouth Every Night.    FLUoxetine (PROzac) 40 MG capsule Unknown Pharmacy Yes No    Take 1 capsule by mouth Every Morning.    fluticasone (FLONASE) 50 MCG/ACT nasal spray Unknown Pharmacy Yes No    2 sprays into the nostril(s) as directed by provider Daily As Needed for Allergies.    gabapentin (NEURONTIN) 300 MG capsule Unknown Pharmacy Yes No    Take 1 capsule by mouth Daily.    hydrALAZINE (APRESOLINE) 10 MG tablet Unknown Pharmacy Yes No    Take 1 tablet by mouth 3 (Three) Times a Day.    HYDROcodone-acetaminophen (NORCO)  MG per tablet Unknown Pharmacy Yes No    Take 1 tablet by mouth 3 (Three) Times a Day As Needed for Moderate Pain.    hydrOXYzine (ATARAX) 25 MG tablet Unknown Pharmacy No No    Take 1 tablet by mouth 3 (Three) Times a Day As Needed for Anxiety.    insulin aspart (novoLOG) 100 UNIT/ML injection Unknown Pharmacy, Provider's Office - Patient Acknowledged and Agreed Yes No    Inject 3 Units under the skin into the appropriate area as directed 3 (Three) Times a Day Before Meals.    insulin detemir (Levemir FlexPen) 100 UNIT/ML injection Unknown  Yes No    Inject 10 Units under the skin into the appropriate area as directed Every Night.    isosorbide mononitrate (IMDUR) 30 MG 24 hr tablet Unknown  No No    Take 1 tablet by mouth Every Morning.    isosorbide mononitrate (IMDUR) 30 MG 24 hr tablet Unknown Pharmacy Yes No    Take 1 tablet by mouth Daily.     levothyroxine (SYNTHROID, LEVOTHROID) 25 MCG tablet Unknown Pharmacy Yes No    Take 1 tablet by mouth Daily.    lisinopril (PRINIVIL,ZESTRIL) 20 MG tablet Unknown Pharmacy Yes No    Take 1 tablet by mouth Daily As Needed (high blood pressure).    loperamide (IMODIUM A-D) 2 MG tablet Unknown Pharmacy Yes No    Take 1 tablet by mouth Daily As Needed for Diarrhea.    metoclopramide (REGLAN) 5 MG tablet Unknown Pharmacy No No    Take 1 tablet by mouth 3 (Three) Times a Day Before Meals.    metoprolol succinate XL (TOPROL-XL) 25 MG 24 hr tablet Unknown  Yes No    Take 1 tablet by mouth Daily.    multivitamin (multivitamin) tablet tablet Unknown Pharmacy No No    Take 1 tablet by mouth Daily.    ondansetron ODT (ZOFRAN-ODT) 4 MG disintegrating tablet Unknown Pharmacy Yes No    Place 1 tablet on the tongue Daily As Needed for Nausea or Vomiting.    pantoprazole (PROTONIX) 40 MG EC tablet Unknown Pharmacy Yes No    Take 1 tablet by mouth Daily.    rOPINIRole (REQUIP) 0.5 MG tablet Unknown Pharmacy Yes No    Take 1 tablet by mouth 2 (Two) Times a Day.    tiZANidine (ZANAFLEX) 4 MG tablet Unknown Pharmacy Yes No    Take 1 tablet by mouth Every 8 (Eight) Hours As Needed for Muscle Spasms.    traZODone (DESYREL) 100 MG tablet Unknown Pharmacy Yes No    Take 1 tablet by mouth Every Night.          Objective     Vital Signs:  Temp:  [97.8 °F (36.6 °C)-98.4 °F (36.9 °C)] 98 °F (36.7 °C)  Heart Rate:  [72-75] 72  Resp:  [14-18] 14  BP: (128-151)/(64-72) 151/72    No data found.  SpO2:  [94 %-96 %] 96 %  on   ;   Device (Oxygen Therapy): room air  Body mass index is 21.28 kg/m².    Wt Readings from Last 3 Encounters:   08/23/23 56.2 kg (124 lb)   08/23/23 56.7 kg (124 lb 14.4 oz)   03/04/23 42.2 kg (93 lb)      ---------------------------------------------------------------------------------------------------------------------     Physical exam:  Constitutional: Chronically ill-appearing female in no distress  HEENT: Normocephalic  atraumatic  Neck: Supple  Cardiovascular: Regular rate and rhythm  Pulmonary/Chest: Clear to auscultation  Abdominal:  .  Positive bowel sounds soft; peritoneal dialysis catheter in place  Musculoskeletal: No obvious arthropathy; does have arthritic changes in the lower extremities  Neurological: Generalized weakness, no obvious focal deficits  Skin: No rash  Peripheral vascular: No edema  Genitourinary::  ---------------------------------------------------------------------------------------------------------------------  EKG: Reviewed EKG from August 19, 2023--normal sinus rhythm with nonspecific ST changes.  Prolonged QT interval  No orders to display           Last echocardiogram:  Results for orders placed during the hospital encounter of 08/19/23    Adult Transthoracic Echo Complete W/ Cont if Necessary Per Protocol    Interpretation Summary    Left ventricular systolic function is normal. Left ventricular ejection fraction appears to be 56 - 60%.    Left ventricular diastolic function is consistent with (grade I) impaired relaxation.    Left atrial volume is mildly increased.    Moderate mitral valve stenosis is present.    Estimated right ventricular systolic pressure from tricuspid regurgitation is mildly elevated (35-45 mmHg).    Mild pulmonary hypertension is present.    There is no evidence of pericardial effusion.    --------------------------------------------------------------------------------------------------------------------  Labs:  Results from last 7 days   Lab Units 08/24/23  0100 08/20/23  0124 08/19/23  1109   WBC 10*3/mm3 4.96 5.94 5.95   HEMOGLOBIN g/dL 8.5* 8.0* 10.1*   HEMATOCRIT % 27.3* 25.3* 31.3*   MCV fL 95.5 94.4 92.6   MCHC g/dL 31.1* 31.6 32.3   PLATELETS 10*3/mm3 141 120* 106*   INR   --   --  0.85*         Results from last 7 days   Lab Units 08/24/23  0100 08/22/23  0139 08/21/23  0138 08/20/23  0124 08/19/23  1109   SODIUM mmol/L 133* 134* 137 138 138   POTASSIUM mmol/L 4.1  3.7 3.2* 3.6 4.0   MAGNESIUM mg/dL 2.0  --   --   --  2.2   CHLORIDE mmol/L 96* 97* 100 104 102   CO2 mmol/L 24.5 24.2 23.8 21.0* 22.2   BUN mg/dL 45* 41* 43* 49* 55*   CREATININE mg/dL 4.78* 4.97* 4.89* 4.89* 5.19*   CALCIUM mg/dL 7.4* 6.8* 7.2* 7.1* 7.4*   PHOSPHORUS mg/dL  --   --   --   --  6.4*   GLUCOSE mg/dL 151* 228* 209* 366* 264*   ALBUMIN g/dL 2.7*  --   --  2.6* 3.3*   BILIRUBIN mg/dL <0.2  --   --  0.2 0.3   ALK PHOS U/L 106  --   --  63 82   AST (SGOT) U/L 22  --   --  11 16   ALT (SGPT) U/L 14  --   --  9 13   Estimated Creatinine Clearance: 10.4 mL/min (A) (by C-G formula based on SCr of 4.78 mg/dL (H)).  No results found for: AMMONIA  Results from last 7 days   Lab Units 08/19/23  1414 08/19/23  1109   HSTROP T ng/L 150* 158*         Glucose   Date/Time Value Ref Range Status   08/24/2023 0552 242 (H) 70 - 130 mg/dL Final   08/23/2023 1911 203 (H) 70 - 130 mg/dL Final   08/23/2023 1618 179 (H) 70 - 130 mg/dL Final   08/23/2023 1052 199 (H) 70 - 130 mg/dL Final   08/23/2023 0710 255 (H) 70 - 130 mg/dL Final   08/23/2023 0027 311 (H) 70 - 130 mg/dL Final   08/22/2023 2019 227 (H) 70 - 130 mg/dL Final   08/22/2023 1656 183 (H) 70 - 130 mg/dL Final     Lab Results   Component Value Date    .900 (H) 08/19/2023    FREET4 0.39 (L) 08/19/2023     No results found for: PREGTESTUR, PREGSERUM, HCG, HCGQUANT  Pain Management Panel  More data exists         Latest Ref Rng & Units 3/5/2023 5/11/2022   Pain Management Panel   Amphetamine, Urine Qual Negative Negative  Negative    Barbiturates Screen, Urine Negative Negative  Negative    Benzodiazepine Screen, Urine Negative Negative  Positive    Buprenorphine, Screen, Urine Negative Negative  Negative    Cocaine Screen, Urine Negative Negative  Negative    Methadone Screen , Urine Negative Negative  Negative    Methamphetamine, Ur Negative Negative  Negative      Brief Urine Lab Results  (Last result in the past 365 days)        Color   Clarity   Blood    Leuk Est   Nitrite   Protein   CREAT   Urine HCG        08/19/23 1337 Yellow   Clear   Negative   Negative   Negative   100 mg/dL (2+)                 No results found for: BLOODCX  No results found for: URINECX  No results found for: WOUNDCX  No results found for: STOOLCX    Last Urine Toxicity  More data exists         Latest Ref Rng & Units 3/5/2023 5/11/2022   LAST URINE TOXICITY RESULTS   Amphetamine, Urine Qual Negative Negative  Negative    Barbiturates Screen, Urine Negative Negative  Negative    Benzodiazepine Screen, Urine Negative Negative  Positive    Buprenorphine, Screen, Urine Negative Negative  Negative    Cocaine Screen, Urine Negative Negative  Negative    Methadone Screen , Urine Negative Negative  Negative    Methamphetamine, Ur Negative Negative  Negative        I have personally looked at the labs and they are summarized above.  ----------------------------------------------------------------------------------------------------------------------  Detailed radiology reports for the last 24 hours:    Imaging Results (Last 24 Hours)       ** No results found for the last 24 hours. **          Final impressions for the last 30 days of radiology reports:    XR Chest AP    Result Date: 8/19/2023   No evidence of acute disease in the chest.  This report was finalized on 8/19/2023 2:53 PM by Steven Melton MD.     I have personally looked at the radiology images and read the final radiology report.    Assessment & Plan    Debility patient require physical therapy 90 minutes/day 5 to 6 days/week proper therapeutic exercise, range of motion, endurance, gait training, safety, stair navigation and strengthening.  Require occupational therapy 90 minutes/day 5 to 6 days/week with dressing, ADLs, feeding, home skills, safety and toileting techniques.  Anticipate patient being able to safely perform activities of daily living using adaptive equipment for improved functional mobility.  Independent and safe  bowel bladder function.  Able to navigate home community territory safe without injury or falls.  Anticipate patient going home with assistance [place] require home health with PT and nursing care.  May require bedside commode, wheelchair and walker at discharge.    End-stage renal disease continue peritoneal dialysis per nephrology instructions.  Appreciate nephrology assistance.    Hypertension--patient's blood pressure is elevated at this time.  Continue Norvasc 10 mg daily, metoprolol.  Patient had lisinopril and hydralazine held during her recent admission.  May have to reconsider restarting hydralazine.  We will monitor pressure closely today    Diabetes mellitus--Levemir 10 units nightly and preprandial dosing of NovoLog    Chronic leg pain/neuropathic pain continue gabapentin 300 mg daily, Norco 10 mg every 6 hours as needed for pain control    Allergic rhinitis Flonase    History of CVA continue aspirin and high-dose statin therapy.    Hypothyroidism Synthroid 25 mcg daily      CHF with preserved EF patient currently on Imdur and beta-blocker.  May consider adding hydralazine    GERD continue PPI    Chronic anemia stable    PAD continue aspirin and high-dose statin therapy      VTE Prophylaxis:   Mechanical Order History:       None          Pharmalogical Order History:       None            Falls Risk Assessment high risk for fall secondary to generalized debility, neuropathy    Yeison Braden MD  [facility]  08/24/23  07:17 EDT

## 2023-08-24 NOTE — THERAPY EVALUATION
Inpatient Rehabilitation - Physical Therapy Initial Evaluation       EDWARD Veliz     Patient Name: Reny Elena  : 1960  MRN: [id number]    Today's Date: 2023                    Admit Date: 2023      Visit Dx:   No diagnosis found.    Patient Active Problem List   Diagnosis    Tibia/fibula fracture    Iron deficiency anemia    Type 2 diabetes mellitus with hyperglycemia, with long-term current use of insulin    Wound of right ankle    Cellulitis    Metabolic encephalopathy    History of non-ST elevation myocardial infarction (NSTEMI)    HTN (hypertension)    CVA (cerebral vascular accident)    Chronic diastolic CHF (congestive heart failure)    Decubitus ulcer of coccyx, unspecified pressure ulcer stage    Depression    Expressive aphasia    Impaired mobility and ADLs    Hyperkalemia    Subdural hematoma    Severe malnutrition    Cerebrovascular accident (CVA), unspecified mechanism    PRES (posterior reversible encephalopathy syndrome)    Debility       Past Medical History:   Diagnosis Date    Arthritis     Closed fracture of right fibula and tibia 2018    Depression     Diabetic ulcer of left foot associated with type 2 diabetes mellitus 2017    Diastolic dysfunction     Disease of thyroid gland     Elevated cholesterol     End stage renal disease     Essential hypertension     GERD (gastroesophageal reflux disease)     History of transfusion     Hyperlipidemia     Iron deficiency anemia 2018    PAD (peripheral artery disease)     Renal failure     Type 2 diabetes mellitus with hyperglycemia, with long-term current use of insulin 2018       Past Surgical History:   Procedure Laterality Date    ABDOMINAL SURGERY      BACK SURGERY      Post spinal diskectomy, osteophytectomy in one lumbar interspace    CATARACT EXTRACTION      Left      SECTION      DENTAL PROCEDURE      ENDOSCOPY      FRACTURE SURGERY      right leg    HYSTERECTOMY      INCISION AND DRAINAGE LEG  "Left 4/15/2017    Procedure: INCISION AND DRAINAGE LOWER EXTREMITY;  Surgeon: Basilio Morris MD;  Location: [facility] OR;  Service:     INCISION AND DRAINAGE LEG Left 5/26/2017    Procedure: Irrigation and Debridment abcess diabetic wound left foot with deep culture;  Surgeon: Basilio Morris MD;  Location: [facility] OR;  Service:     INSERTION PERITONEAL DIALYSIS CATHETER N/A 12/16/2021    Procedure: INSERTION PERITONEAL DIALYSIS CATHETER LAPAROSCOPIC;  Surgeon: Edy Glover MD;  Location: [facility] OR;  Service: General;  Laterality: N/A;    KNEE ARTHROSCOPY Right     ORIF TIBIA FRACTURE Right 12/28/2018    Procedure: TIBIAL  FRACTURE OPEN REDUCTION INTERNAL FIXATION;  Surgeon: Jung Barragan MD;  Location: [facility] OR;  Service: Orthopedics    TOE AMPUTATION Right     5th    TUBAL ABDOMINAL LIGATION         PT ASSESSMENT (last 12 hours)       IRF PT Evaluation and Treatment       Row Name 08/24/23 1131          PT Time and Intention    Document Type initial evaluation;daily treatment  -LB     Mode of Treatment individual therapy;physical therapy  -LB       Row Name 08/24/23 1131          General Information    Existing Precautions/Restrictions fall  peritoneal dialysis port at ABD, L patella subluxation  -LB       Row Name 08/24/23 1131          Previous Level of Function/Home Environm    Additional Documentation --  her S.O. is currently hospitilized and he is the one who sets up her home dialysis. she uses a rollator at home, has frequent falls.  -LB       Row Name 08/24/23 1131          Pain Scale: FACES Pre/Post-Treatment    Pain: FACES Scale, Pretreatment 4-->hurts little more  -LB     Posttreatment Pain Rating 4-->hurts little more  -LB     Pain Location - --  \"legs\"  -LB     Pre/Posttreatment Pain Comment rest, repositioned  -LB       Row Name 08/24/23 1131          Cognition/Psychosocial    Affect/Mental Status (Cognition) WFL  -LB     Follows Commands (Cognition) verbal cues/prompting required;physical/tactile " prompts required  -LB     Personal Safety Interventions gait belt;nonskid shoes/slippers when out of bed;supervised activity  -LB       Row Name 08/24/23 1131          Range of Motion (ROM)    Range of Motion --  R ankle limited 75%, L ankle limited 50%, she reports multiple RLE surgeries. when she extends the L knee from 30-0 degrees for terminal knee extension her L patella subluxes.  -LB       Row Name 08/24/23 1131          Strength (Manual Muscle Testing)    Strength (Manual Muscle Testing) --  BLE grossly 3/5  -LB       Row Name 08/24/23 1131          Bed Mobility    Supine-Sit-Supine [place]unky (Bed Mobility) minimum assist (75% patient effort);verbal cues;nonverbal cues (demo/gesture)  -LB       Row Name 08/24/23 1131          Bed-Chair Transfer    Bed-Chair [place]unky (Transfers) minimum assist (75% patient effort);verbal cues;nonverbal cues (demo/gesture)  -LB     Assistive Device (Bed-Chair Transfers) wheelchair  -LB       Row Name 08/24/23 1131          Chair-Bed Transfer    Chair-Bed [place]unky (Transfers) minimum assist (75% patient effort);verbal cues;nonverbal cues (demo/gesture)  -LB     Assistive Device (Chair-Bed Transfers) wheelchair  -LB       Row Name 08/24/23 1131          Sit-Stand Transfer    Sit-Stand [place] (Transfers) minimum assist (75% patient effort);verbal cues;nonverbal cues (demo/gesture)  -LB     Assistive Device (Sit-Stand Transfers) wheelchair;walker, front-wheeled  -LB       Row Name 08/24/23 1131          Stand-Sit Transfer    Stand-Sit [place] (Transfers) minimum assist (75% patient effort);verbal cues;nonverbal cues (demo/gesture)  -LB     Assistive Device (Stand-Sit Transfers) wheelchair;walker, front-wheeled  -LB       Row Name 08/24/23 1131          Toilet Transfer    Type (Toilet Transfer) stand pivot/stand step  -LB     [place] Level (Toilet Transfer) minimum assist (75% patient effort);verbal cues;nonverbal cues (demo/gesture)  -LB     Assistive  "Device (Toilet Transfer) grab bars/safety frame;wheelchair  -LB       Row Name 08/24/23 1131          Gait/Stairs (Locomotion)    [place] Level (Gait) contact guard;verbal cues;nonverbal cues (demo/gesture)  -LB     Assistive Device (Gait) walker, front-wheeled  -LB     Distance in Feet (Gait) 60' 70'  -LB     Deviations/Abnormal Patterns (Gait) casa decreased;gait speed decreased;stride length decreased  -LB     Bilateral Gait Deviations forward flexed posture;heel strike decreased  -LB       Row Name 08/24/23 1131          Safety Issues, Functional Mobility    Impairments Affecting Function (Mobility) balance;endurance/activity tolerance;pain;range of motion (ROM);strength  -LB       Row Name 08/24/23 1131          Balance    Comment, Balance sitting bag toss and  with reacher, 2x using BUE  -LB       Row Name 08/24/23 1131          Motor Skills    Therapeutic Exercise --  sitting ex, supine ex  -LB       Row Name 08/24/23 1131          Positioning and Restraints    Pre-Treatment Position sitting in chair/recliner  -LB     In Bed call light within reach;encouraged to call for assist;heels elevated  -LB       Row Name 08/24/23 1131          Vital Signs    Intra Systolic BP Rehab 163  after amb, she reported feeling \"woozy\"  -LB     Intra Treatment Diastolic BP 66  -LB       Row Name 08/24/23 1131          Therapy Assessment/Plan (PT)    Patient's Goals For Discharge return home  -LB       Row Name 08/24/23 1131          Therapy Assessment/Plan (PT)    PT Diagnosis (PT) Debility-missed dialysis, HTN, hypothyroidism  -LB     Rehab Potential/Prognosis (PT) adequate, monitor progress closely  -LB     Frequency of Treatment (PT) 5 times per week  -LB     Estimated Duration of Therapy (PT) 2 weeks  -LB     Problem List (PT) balance;mobility;range of motion (ROM);strength;pain  -LB     Activity Limitations Related to Problem List (PT) unable to ambulate safely;unable to transfer safely  -LB       Row Name " 08/24/23 1131          Therapy Plan Review/Discharge Plan (PT)    Therapy Plan Review (PT) evaluation/treatment results reviewed;care plan/treatment goals reviewed;risks/benefits reviewed;participants voiced agreement with care plan  -LB     Anticipated Equipment Needs at Discharge (PT Eval) --  tbd  -LB     Anticipated Discharge Disposition (PT) home with assist;home with home health;home with outpatient therapy services  -LB       Row Name 08/24/23 1131          IRF PT Goals    Bed Mobility Goal Selection (PT-IRF) bed mobility, PT goal 1  -LB     Transfer Goal Selection (PT-IRF) transfers, PT goal 1  -LB     Gait (Walking Locomotion) Goal Selection (PT-IRF) gait, PT goal 1  -LB     Stairs Goal Selection (PT-IRF) stairs, PT goal 1  -LB       Row Name 08/24/23 1131          Bed Mobility Goal 1 (PT-IRF)    Activity/Assistive Device (Bed Mobility Goal 1, PT-IRF) sit to supine/supine to sit  -LB     [place] Level (Bed Mobility Goal 1, PT-IRF) independent  -LB     Time Frame (Bed Mobility Goal 1, PT-IRF) by discharge  -LB       Row Name 08/24/23 1131          Transfer Goal 1 (PT-IRF)    Activity/Assistive Device (Transfer Goal 1, PT-IRF) sit-to-stand/stand-to-sit;bed-to-chair/chair-to-bed  -LB     [place] Level (Transfer Goal 1, PT-IRF) modified independence  -LB     Time Frame (Transfer Goal 1, PT-IRF) by discharge  -LB       Row Name 08/24/23 1131          Gait/Walking Locomotion Goal 1 (PT-IRF)    Activity/Assistive Device (Gait/Walking Locomotion Goal 1, PT-IRF) walker, rolling  -LB     Gait/Walking Locomotion Distance Goal 1 (PT-IRF) 300'  -LB     [place] Level (Gait/Walking Locomotion Goal 1, PT-IRF) supervision required  -LB     Time Frame (Gait/Walking Locomotion Goal 1, PT-IRF) by discharge  -LB       Row Name 08/24/23 1131          Stairs Goal 1 (PT-IRF)    Activity/Assistive Device (Stairs Goal 1, PT-IRF) ascending stairs;descending stairs;using handrail, left;using handrail, right  -LB      Number of Stairs (Stairs Goal 1, PT-IRF) 5  -LB     [place] Level (Stairs Goal 1, PT-IRF) supervision required  -LB     Time Frame (Stairs Goal 1, PT-IRF) by discharge  -LB               User Key  (r) = Recorded By, (t) = Taken By, (c) = Cosigned By      Initials Name Provider Type    LB Christel Ferguson, PT Physical Therapist                  Wound 08/23/23 1923 Right posterior heel (Active)   Wound Image   08/23/23 1924   Dressing Appearance open to air 08/24/23 0800   Closure None 08/24/23 0800   Base non-blanchable;maroon/purple 08/24/23 0800   Wound Length (cm) 0.5 cm 08/23/23 1924   Wound Width (cm) 0.75 cm 08/23/23 1924   Wound Surface Area (cm^2) 0.375 cm^2 08/23/23 1924   Drainage Amount none 08/24/23 0800   Care, Wound pressure removed 08/24/23 0800   Dressing Care open to air 08/24/23 0800       Wound 08/23/23 2313 Left gluteal (Active)   Wound Image   08/23/23 2314   Dressing Appearance open to air 08/24/23 0800   Closure None 08/24/23 0800   Base non-blanchable 08/24/23 0800   Periwound blistered 08/24/23 0800   Drainage Amount none 08/24/23 0800   Care, Wound barrier applied;pressure removed 08/24/23 0800   Dressing Care open to air 08/24/23 0800     Physical Therapy Education       Title: PT OT SLP Therapies (Done)       Topic: Physical Therapy (Done)       Point: Mobility training (Done)       Learning Progress Summary             Patient Acceptance, E, VU,NR by LB at 8/24/2023 1149                         Point: Home exercise program (Done)       Learning Progress Summary             Patient Acceptance, E, VU,NR by LB at 8/24/2023 1149                         Point: Body mechanics (Done)       Learning Progress Summary             Patient Acceptance, E, VU,NR by LB at 8/24/2023 1149                         Point: Precautions (Done)       Learning Progress Summary             Patient Acceptance, E, VU,NR by LB at 8/24/2023 1149                                         User Key       Initials  Effective Dates Name Provider Type Discipline    LB 06/16/21 -  Christel Ferguson PT Physical Therapist PT                    PT Recommendation and Plan    Planned Therapy Interventions (PT): balance training, bed mobility training, gait training, patient/family education, ROM (range of motion), strengthening, transfer training  Frequency of Treatment (PT): 5 times per week  Anticipated Equipment Needs at Discharge (PT Eval):  (tbd)                  Time Calculation:      PT Charges       Row Name 08/24/23 1149             Time Calculation    Start Time 1000  -LB      Stop Time 1130  -LB      Time Calculation (min) 90 min  -LB      PT Received On 08/24/23  -LB      PT Goal Re-Cert Due Date 08/31/23  -LB                User Key  (r) = Recorded By, (t) = Taken By, (c) = Cosigned By      Initials Name Provider Type    LB Christel Ferguson, PT Physical Therapist                    Therapy Charges for Today       Code Description Service Date Service Provider Modifiers Qty    96650719858 HC PT EVAL LOW COMPLEXITY 1 8/24/2023 Christel Ferguson, PT GP 1    39670861591 HC GAIT TRAINING EA 15 MIN 8/24/2023 Christel Ferguson, PT GP 1    49729431913 HC PT THER PROC EA 15 MIN 8/24/2023 Christel Ferguson, PT GP 3    61012510357 HC PT THERAPEUTIC ACT EA 15 MIN 8/24/2023 Christel Ferguson, PT GP 1              PT G-Codes  AM-PAC 6 Clicks Score (PT): 17      Christel Ferguson, PT  8/24/2023

## 2023-08-24 NOTE — PROGRESS NOTES
Patient Assessment Instrument  Quality Indicators - Admission FY 2023    Section A. Ethnicity/ Race/Language  Ethnicity:  Race:  Preferred Language:    Section A. Transportation      Section B. Hearing and Vision        Section B. Health Literacy        Section C. Cognitive Patterns      Section C. Signs and Symptoms of Delirium (from CAM)      Section D. Mood      Section D. Social Isolation      Section [id number]. Prior Functioning      Section [id number]. Prior Device Use      Section [id number]. Self Care Performance     Eating: [place] sets up or cleans up; patient completes activity. [place] assists  only prior to or following the activity.   Oral Hygiene: [place] provides verbal cues and/or touching/steadying and/or  contact guard assistance as patient completes activity.   Toileting Hygiene: [place] does less than half the effort. [place] lifts, holds  or supports trunk or limbs but provides less than half the effort.   Shower/Bathe Self: [place] does less than half the effort. [place] lifts, holds  or supports trunk or limbs but provides less than half the effort.   Upper Body Dressing: [place] provides verbal cues and/or touching/steadying  and/or contact guard assistance as patient completes activity.   Lower Body Dressing: [place] does less than half the effort. [place] lifts, holds  or supports trunk or limbs but provides less than half the effort.   Putting On/Taking Off Footwear: [place] does less than half the effort. [place]  lifts, holds or supports trunk or limbs but provides less than half the effort.    Section IV0471. Self Care Discharge Goals     Eating: [place] sets up or cleans up; patient completes activity. [place] assists  only prior to or following the activity.   Oral Hygiene: [place] sets up or cleans up; patient completes activity. [place]  assists only prior to or following the activity.   Toileting Hygiene: [place] provides verbal cues or touching/steadying assistance  as patient completes activity.    Shower/Bathe Self: [place] provides verbal cues or touching/steadying assistance  as patient completes activity.   Upper Body Dressing: [place] sets up or cleans up; patient completes activity.  [place] assists only prior to or following the activity.   Lower Body Dressing: [place] provides verbal cues or touching/steadying  assistance as patient completes activity.   Putting On/Taking Off Footwear: [place] provides verbal cues or  touching/steadying assistance as patient completes activity.    Section [id number]. Mobility Performance      Section TR1543. Mobility Discharge Goals      Section H. Bladder and Bowel      Section I. Active Diagnosis      Section J. Health Conditions      Section J. Health Conditions (Pain)      Section K. Swallowing/Nutritional Status    Nutritional Approaches on Admission:    Section M. Skin Conditions      Section N. Medication        Section O. Special Treatments, Procedures, and Programs    Signed by: Yael Mendez OT

## 2023-08-24 NOTE — THERAPY EVALUATION
Inpatient Rehabilitation - Occupational Therapy Initial Evaluation and Treatment Note    EDWARD Veliz     Patient Name: Reny Elena  : 1960  MRN: [id number]    Today's Date: 2023                 Admit Date: 2023       No diagnosis found.    Patient Active Problem List   Diagnosis    Tibia/fibula fracture    Iron deficiency anemia    Type 2 diabetes mellitus with hyperglycemia, with long-term current use of insulin    Wound of right ankle    Cellulitis    Metabolic encephalopathy    History of non-ST elevation myocardial infarction (NSTEMI)    HTN (hypertension)    CVA (cerebral vascular accident)    Chronic diastolic CHF (congestive heart failure)    Decubitus ulcer of coccyx, unspecified pressure ulcer stage    Depression    Expressive aphasia    Impaired mobility and ADLs    Hyperkalemia    Subdural hematoma    Severe malnutrition    Cerebrovascular accident (CVA), unspecified mechanism    PRES (posterior reversible encephalopathy syndrome)    Debility       Past Medical History:   Diagnosis Date    Arthritis     Closed fracture of right fibula and tibia 2018    Depression     Diabetic ulcer of left foot associated with type 2 diabetes mellitus 2017    Diastolic dysfunction     Disease of thyroid gland     Elevated cholesterol     End stage renal disease     Essential hypertension     GERD (gastroesophageal reflux disease)     History of transfusion     Hyperlipidemia     Iron deficiency anemia 2018    PAD (peripheral artery disease)     Renal failure     Type 2 diabetes mellitus with hyperglycemia, with long-term current use of insulin 2018       Past Surgical History:   Procedure Laterality Date    ABDOMINAL SURGERY      BACK SURGERY      Post spinal diskectomy, osteophytectomy in one lumbar interspace    CATARACT EXTRACTION      Left      SECTION      DENTAL PROCEDURE      ENDOSCOPY      FRACTURE SURGERY      right leg    HYSTERECTOMY      INCISION AND DRAINAGE  LEG Left 4/15/2017    Procedure: INCISION AND DRAINAGE LOWER EXTREMITY;  Surgeon: Basilio Morris MD;  Location:  [facility] OR;  Service:     INCISION AND DRAINAGE LEG Left 5/26/2017    Procedure: Irrigation and Debridment abcess diabetic wound left foot with deep culture;  Surgeon: Basilio Morris MD;  Location:  [facility] OR;  Service:     INSERTION PERITONEAL DIALYSIS CATHETER N/A 12/16/2021    Procedure: INSERTION PERITONEAL DIALYSIS CATHETER LAPAROSCOPIC;  Surgeon: Edy Glover MD;  Location: [facility] OR;  Service: General;  Laterality: N/A;    KNEE ARTHROSCOPY Right     ORIF TIBIA FRACTURE Right 12/28/2018    Procedure: TIBIAL  FRACTURE OPEN REDUCTION INTERNAL FIXATION;  Surgeon: Jung Barragan MD;  Location: [facility] OR;  Service: Orthopedics    TOE AMPUTATION Right     5th    TUBAL ABDOMINAL LIGATION               IRF OT ASSESSMENT FLOWSHEET (last 12 hours)       IRF OT Evaluation and Treatment       Row Name 08/24/23 1454          OT Time and Intention    Document Type initial evaluation;daily treatment  -BF     Mode of Treatment occupational therapy  -BF     Patient Effort good  -BF     Symptoms Noted During/After Treatment fatigue  -BF       Row Name 08/24/23 1457          General Information    Patient Profile Reviewed yes  -BF     Patient/Family/Caregiver Comments/Observations Pt supine in bed, agreeable to OT. Pt reports that she missed dialysis sessions resulting in hospitalization and has felt much weaker than her baseline. Pt's s/o who cares for her at home is currently admitted to ICU.  -BF     Existing Precautions/Restrictions fall  peritoneal dialysis port at ABD, L patella subluxation  -BF       Row Name 08/24/23 9130          Previous Level of Function/Home Environm    BADLs, Premorbid Functional Level partial assistance  -BF       Row Name 08/24/23 7107          Living Environment    Current Living Arrangements home  -BF     People in Home spouse  -BF     Primary Care Provided by  self;spouse/significant other  -BF       Row Name 08/24/23 1450          Home Use of Assistive/Adaptive Equipment    Equipment Currently Used at Home commode;wheelchair;rollator;cane, straight;other (see comments);shower chair  peritoneal dialysis machine  -BF       Row Name 08/24/23 1450          Cognition/Psychosocial    Orientation Status (Cognition) oriented x 3  -BF     Follows Commands (Cognition) follows one-step commands;verbal cues/prompting required  -BF       Row Name 08/24/23 1450          Range of Motion (ROM)    Range of Motion bilateral upper extremities;ROM is WFL  -BF       Row Name 08/24/23 1450          Strength (Manual Muscle Testing)    Left Hand, Setting 1 (Dynamometer Testing) 20 lbs  -BF     Right Hand, Setting 1 (Dynamometer Testing) 18 lbs  -BF     Additional Documentation Left Hand, Setting 1 (Dynamometer Testing) (Row);Right Hand, Setting 1 (Dynamometer Testing) (Row)  -BF       Row Name 08/24/23 1450          Strength Comprehensive (MMT)    Comment, General Manual Muscle Testing (MMT) Assessment BUE grossly 3+/5  -BF       Row Name 08/24/23 1450          Vision Assessment/Intervention    Visual Impairment/Limitations --  Pt reports impaired  vision, needing new glasses  -BF       Row Name 08/24/23 1450          Basic Activities of Daily Living (BADLs)    Basic Activities of Daily Living bathing;upper body dressing;lower body dressing;toileting;grooming;self-feeding  -BF       Row Name 08/24/23 1450          Bathing    Comment (Bathing) Mod A  -BF       [facility] Name 08/24/23 1450          Upper Body Dressing    [place] Level (Upper Body Dressing) set up assistance;supervision;verbal cues  -BF     Position (Upper Body Dressing) supported sitting  -BF     Comment (Upper Body Dressing) Set-up/supervision  -BF       Row Name 08/24/23 1450          Lower Body Dressing    [place] Level (Lower Body Dressing) minimum assist (75% patient effort);verbal cues;nonverbal cues (demo/gesture)  -BF      Position (Lower Body Dressing) supported sitting  -BF     Comment (Lower Body Dressing) Min A  -BF       Row Name 08/24/23 1450          Grooming    [place] Level (Grooming) set up;supervision;verbal cues  -BF     Position (Grooming) supported sitting  -BF     Comment (Grooming) Set-up/supervision  -BF       Row Name 08/24/23 1450          Toileting    [place] Level (Toileting) moderate assist (50% patient effort)  -BF     Position (Toileting) supported sitting  -BF     Comment (Toileting) Mod A  -BF       Row Name 08/24/23 1450          Self-Feeding    Comment (Self-Feeding) Set-up  -BF       Row Name 08/24/23 1450          Transfer Assessment/Treatment    Transfers bed-chair transfer;chair-bed transfer  -BF       Row Name 08/24/23 1450          Bed-Chair Transfer    Bed-Chair [place] (Transfers) minimum assist (75% patient effort);nonverbal cues (demo/gesture);verbal cues  -BF     Assistive Device (Bed-Chair Transfers) wheelchair  -BF       Row Name 08/24/23 1450          Chair-Bed Transfer    Chair-Bed [place] (Transfers) minimum assist (75% patient effort);verbal cues;nonverbal cues (demo/gesture)  -BF     Assistive Device (Chair-Bed Transfers) wheelchair  -BF       Row Name 08/24/23 1450          Motor Skills    Motor Skills functional endurance;motor control/coordination interventions  -BF     Coordination fine motor deficit;bilateral;upper extremity;minimal impairment  -BF     Functional Endurance Poor/fair  -BF     Motor Control/Coordination Interventions fine motor manipulation/dexterity activities;gross motor coordination activities;therapeutic exercise/ROM  BUE GMC/FMC theract, strengthening; BUE flexbar therex, handgripper  -BF       Row Name 08/24/23 1450          Positioning and Restraints    In Bed supine;call light within reach;encouraged to call for assist  In PM  -BF       Row Name 08/24/23 1450          Therapy Assessment/Plan (OT)    Patient's Goals For Discharge return  home;take care of myself at home  -BF       Row Name 08/24/23 1450          Therapy Assessment/Plan (OT)    OT Diagnosis Debility  -BF     Rehab Potential/Prognosis (OT) good, to achieve stated therapy goals  -BF     Frequency of Treatment (OT) 5 times per week  -BF     Estimated Duration of Therapy (OT) 2 weeks  -BF     Problem List (OT) problems related to;balance;coordination;mobility;strength;vision  activity tolerance  -BF     Activity Limitations Related to Problem List (OT) unable to transfer safely;BADLs not performed adequately or safely  -BF     Planned Therapy Interventions (OT) activity tolerance training;adaptive equipment training;BADL retraining;ROM/therapeutic exercise;strengthening exercise;transfer/mobility retraining  -BF       Row Name 08/24/23 1450          Therapy Plan Review/Discharge Plan (OT)    Therapy Plan Review (OT) patient  -BF     Anticipated Equipment Needs At Discharge (OT) --  TBD  -BF     Anticipated Discharge Disposition (OT) home with assist  TBD  -BF       Row Name 08/24/23 1450          IRF OT Goals    LB Dressing Goal Selection (OT-IRF) LB dressing, OT goal 1;LB dressing, OT goal 2  -BF     Toileting Goal Selection (OT-IRF) toileting, OT goal 1;toileting, OT goal 2  -BF       Row Name 08/24/23 1450          LB Dressing Goal 1 (OT-IRF)    Activity/Device (LB Dressing Goal 1, OT-IRF) lower body dressing  -BF     [place] (LB Dressing Goal 1, OT-IRF) contact guard required  -BF     Time Frame (LB Dressing Goal 1, OT-IRF) short-term goal (STG);1 week  -BF       Row Name 08/24/23 7790          LB Dressing Goal 2 (OT-IRF)    Activity/Device (LB Dressing Goal 2, OT-IRF) lower body dressing  -BF     [place] (LB Dressing Goal 2, OT-IRF) standby assist  -BF     Time Frame (LB Dressing Goal 2, OT-IRF) long-term goal (LTG);by discharge  -BF       Row Name 08/24/23 1450          Toileting Goal 1 (OT-IRF)    Activity/Device (Toileting Goal 1, OT-IRF) toileting skills, all  -BF      [place] Level (Toileting Goal 1, OT-IRF) minimum assist (75% or more patient effort)  -     Time Frame (Toileting Goal 1, OT-IRF) short-term goal (STG);1 week  -       Row Name 08/24/23 1450          Toileting Goal 2 (OT-IRF)    Activity/Device (Toileting Goal 2, OT-IRF) toileting skills, all  -BF     [place] Level (Toileting Goal 2, OT-IRF) contact guard required  -     Time Frame (Toileting Goal 2, OT-IRF) long-term goal (LTG);by discharge  -               User Key  (r) = Recorded By, (t) = Taken By, (c) = Cosigned By      Initials Name Effective Dates     Yael Mendez OT 07/11/23 -                      Occupational Therapy Education       Title: PT OT SLP Therapies (Done)       Topic: Occupational Therapy (Done)       Point: ADL training (Done)       Description:   Instruct learner(s) on proper safety adaptation and remediation techniques during self care or transfers.   Instruct in proper use of assistive devices.                  Learning Progress Summary             Patient Acceptance, E, VU,NR by  at 8/24/2023 1450                         Point: Precautions (Done)       Description:   Instruct learner(s) on prescribed precautions during self-care and functional transfers.                  Learning Progress Summary             Patient Acceptance, E, VU,NR by  at 8/24/2023 1450                                         User Key       Initials Effective Dates Name Provider Type Discipline     07/11/23 -  Yael Mendez, OT Occupational Therapist OT                        OT Recommendation and Plan    Planned Therapy Interventions (OT): activity tolerance training, adaptive equipment training, BADL retraining, ROM/therapeutic exercise, strengthening exercise, transfer/mobility retraining                    Time Calculation:      Time Calculation- OT       Row Name 08/24/23 1525 08/24/23 0915          Time Calculation- OT    OT Start Time 1245  - 0915  -     OT Stop  Time 1330  -BF 1000  -BF     OT Time Calculation (min) 45 min  -BF 45 min  -BF     Total Timed Code Minutes- OT 45 minute(s)  -BF 45 minute(s)  -BF     OT Non-Billable Time (min) -- 30 min  -BF               User Key  (r) = Recorded By, (t) = Taken By, (c) = Cosigned By      Initials Name Provider Type    BF Yael Mendez OT Occupational Therapist                  Therapy Charges for Today       Code Description Service Date Service Provider Modifiers Qty    98872246863 HC OT EVAL HIGH COMPLEXITY 3 8/24/2023 Yael Mendez OT GO 1    41576111445 HC OT SELF CARE/MGMT/TRAIN EA 15 MIN 8/24/2023 Yael Mendez OT GO 1    10832923014 HC OT THERAPEUTIC ACT EA 15 MIN 8/24/2023 Yael Mendez OT GO 1    78575287397 HC OT THER PROC EA 15 MIN 8/24/2023 Yael Mendez OT GO 1                     Yael Mendez OT  8/24/2023

## 2023-08-25 LAB
GLUCOSE BLDC GLUCOMTR-MCNC: 186 MG/DL (ref 70–130)
GLUCOSE BLDC GLUCOMTR-MCNC: 224 MG/DL (ref 70–130)
GLUCOSE BLDC GLUCOMTR-MCNC: 244 MG/DL (ref 70–130)
GLUCOSE BLDC GLUCOMTR-MCNC: 324 MG/DL (ref 70–130)
GLUCOSE BLDC GLUCOMTR-MCNC: 344 MG/DL (ref 70–130)

## 2023-08-25 PROCEDURE — 25010000002 HEPARIN (PORCINE) PER 1000 UNITS: Performed by: FAMILY MEDICINE

## 2023-08-25 PROCEDURE — 97110 THERAPEUTIC EXERCISES: CPT

## 2023-08-25 PROCEDURE — 97116 GAIT TRAINING THERAPY: CPT

## 2023-08-25 PROCEDURE — 97530 THERAPEUTIC ACTIVITIES: CPT | Performed by: OCCUPATIONAL THERAPIST

## 2023-08-25 PROCEDURE — 97530 THERAPEUTIC ACTIVITIES: CPT

## 2023-08-25 PROCEDURE — 97535 SELF CARE MNGMENT TRAINING: CPT | Performed by: OCCUPATIONAL THERAPIST

## 2023-08-25 PROCEDURE — 99231 SBSQ HOSP IP/OBS SF/LOW 25: CPT | Performed by: FAMILY MEDICINE

## 2023-08-25 PROCEDURE — 63710000001 INSULIN LISPRO (HUMAN) PER 5 UNITS: Performed by: INTERNAL MEDICINE

## 2023-08-25 PROCEDURE — 97110 THERAPEUTIC EXERCISES: CPT | Performed by: OCCUPATIONAL THERAPIST

## 2023-08-25 PROCEDURE — 82948 REAGENT STRIP/BLOOD GLUCOSE: CPT

## 2023-08-25 PROCEDURE — 63710000001 INSULIN GLARGINE PER 5 UNITS: Performed by: INTERNAL MEDICINE

## 2023-08-25 RX ORDER — CASTOR OIL AND BALSAM, PERU 788; 87 MG/G; MG/G
1 OINTMENT TOPICAL EVERY 12 HOURS SCHEDULED
Status: DISCONTINUED | OUTPATIENT
Start: 2023-08-25 | End: 2023-09-07 | Stop reason: HOSPADM

## 2023-08-25 RX ADMIN — INSULIN LISPRO 3 UNITS: 100 INJECTION, SOLUTION INTRAVENOUS; SUBCUTANEOUS at 11:50

## 2023-08-25 RX ADMIN — CASTOR OIL AND BALSAM, PERU 1 APPLICATION: 788; 87 OINTMENT TOPICAL at 21:38

## 2023-08-25 RX ADMIN — Medication 1 TABLET: at 09:40

## 2023-08-25 RX ADMIN — ROPINIROLE HYDROCHLORIDE 0.5 MG: 0.25 TABLET, FILM COATED ORAL at 21:38

## 2023-08-25 RX ADMIN — TRAZODONE HYDROCHLORIDE 50 MG: 50 TABLET ORAL at 21:38

## 2023-08-25 RX ADMIN — FLUOXETINE HYDROCHLORIDE 40 MG: 20 CAPSULE ORAL at 09:39

## 2023-08-25 RX ADMIN — METOPROLOL SUCCINATE 25 MG: 25 TABLET, EXTENDED RELEASE ORAL at 09:39

## 2023-08-25 RX ADMIN — ISOSORBIDE MONONITRATE 30 MG: 30 TABLET, EXTENDED RELEASE ORAL at 09:39

## 2023-08-25 RX ADMIN — HYDROCODONE BITARTRATE AND ACETAMINOPHEN 1 TABLET: 10; 325 TABLET ORAL at 17:29

## 2023-08-25 RX ADMIN — AMLODIPINE BESYLATE 10 MG: 10 TABLET ORAL at 09:39

## 2023-08-25 RX ADMIN — INSULIN LISPRO 5 UNITS: 100 INJECTION, SOLUTION INTRAVENOUS; SUBCUTANEOUS at 21:38

## 2023-08-25 RX ADMIN — HEPARIN SODIUM 5000 UNITS: 5000 INJECTION INTRAVENOUS; SUBCUTANEOUS at 09:39

## 2023-08-25 RX ADMIN — LEVOTHYROXINE SODIUM 25 MCG: 25 TABLET ORAL at 09:39

## 2023-08-25 RX ADMIN — PANTOPRAZOLE SODIUM 40 MG: 40 TABLET, DELAYED RELEASE ORAL at 09:40

## 2023-08-25 RX ADMIN — HEPARIN SODIUM 5000 UNITS: 5000 INJECTION INTRAVENOUS; SUBCUTANEOUS at 21:38

## 2023-08-25 RX ADMIN — TIZANIDINE 4 MG: 4 TABLET ORAL at 21:39

## 2023-08-25 RX ADMIN — ATORVASTATIN CALCIUM 80 MG: 40 TABLET, FILM COATED ORAL at 21:38

## 2023-08-25 RX ADMIN — ROPINIROLE HYDROCHLORIDE 0.5 MG: 0.25 TABLET, FILM COATED ORAL at 09:40

## 2023-08-25 RX ADMIN — HYDROXYZINE HYDROCHLORIDE 12.5 MG: 25 TABLET, FILM COATED ORAL at 21:39

## 2023-08-25 RX ADMIN — CASTOR OIL AND BALSAM, PERU 1 APPLICATION: 788; 87 OINTMENT TOPICAL at 17:25

## 2023-08-25 RX ADMIN — INSULIN LISPRO 2 UNITS: 100 INJECTION, SOLUTION INTRAVENOUS; SUBCUTANEOUS at 09:52

## 2023-08-25 RX ADMIN — INSULIN GLARGINE 6 UNITS: 100 INJECTION, SOLUTION SUBCUTANEOUS at 21:39

## 2023-08-25 RX ADMIN — INSULIN LISPRO 3 UNITS: 100 INJECTION, SOLUTION INTRAVENOUS; SUBCUTANEOUS at 17:26

## 2023-08-25 RX ADMIN — ASPIRIN 81 MG: 81 TABLET, COATED ORAL at 09:39

## 2023-08-25 NOTE — PROGRESS NOTES
Patient Assessment Instrument  Quality Indicators - Admission FY 2023    Section A. Ethnicity/ Race/Language  Ethnicity:  Race:  Preferred Language:    Section A. Transportation  Issues Due to Lack of Transportation:   No    Section B. Hearing and Vision        Section B. Health Literacy        Section C. Cognitive Patterns      Section C. Signs and Symptoms of Delirium (from CAM)      Section D. Mood      Section D. Social Isolation      Section KS7954. Prior Functioning      Section TD0445. Prior Device Use  Manual wheelchair, Walker    Section [id number]. Self Care Performance      Section [id number]. Self Care Discharge Goals      Section [id number]. Mobility Performance      Section VA8918. Mobility Discharge Goals      Section H. Bladder and Bowel      Section I. Active Diagnosis      Section J. Health Conditions      Section J. Health Conditions (Pain)      Section K. Swallowing/Nutritional Status    Nutritional Approaches on Admission:    Section M. Skin Conditions      Section N. Medication        Section O. Special Treatments, Procedures, and Programs    Signed by: PA Arnold

## 2023-08-25 NOTE — THERAPY TREATMENT NOTE
Inpatient Rehabilitation - Occupational Therapy Treatment Note    EDWARD Veliz     Patient Name: Reny Elena  : 1960  MRN: [id number]    Today's Date: 2023                 Admit Date: 2023       No diagnosis found.    Patient Active Problem List   Diagnosis    Tibia/fibula fracture    Iron deficiency anemia    Type 2 diabetes mellitus with hyperglycemia, with long-term current use of insulin    Wound of right ankle    Cellulitis    Metabolic encephalopathy    History of non-ST elevation myocardial infarction (NSTEMI)    HTN (hypertension)    CVA (cerebral vascular accident)    Chronic diastolic CHF (congestive heart failure)    Decubitus ulcer of coccyx, unspecified pressure ulcer stage    Depression    Expressive aphasia    Impaired mobility and ADLs    Hyperkalemia    Subdural hematoma    Severe malnutrition    Cerebrovascular accident (CVA), unspecified mechanism    PRES (posterior reversible encephalopathy syndrome)    Debility       Past Medical History:   Diagnosis Date    Arthritis     Closed fracture of right fibula and tibia 2018    Depression     Diabetic ulcer of left foot associated with type 2 diabetes mellitus 2017    Diastolic dysfunction     Disease of thyroid gland     Elevated cholesterol     End stage renal disease     Essential hypertension     GERD (gastroesophageal reflux disease)     History of transfusion     Hyperlipidemia     Iron deficiency anemia 2018    PAD (peripheral artery disease)     Renal failure     Type 2 diabetes mellitus with hyperglycemia, with long-term current use of insulin 2018       Past Surgical History:   Procedure Laterality Date    ABDOMINAL SURGERY      BACK SURGERY      Post spinal diskectomy, osteophytectomy in one lumbar interspace    CATARACT EXTRACTION      Left      SECTION      DENTAL PROCEDURE      ENDOSCOPY      FRACTURE SURGERY      right leg    HYSTERECTOMY      INCISION AND DRAINAGE LEG Left 4/15/2017     Procedure: INCISION AND DRAINAGE LOWER EXTREMITY;  Surgeon: Basilio Morris MD;  Location: [facility] OR;  Service:     INCISION AND DRAINAGE LEG Left 5/26/2017    Procedure: Irrigation and Debridment abcess diabetic wound left foot with deep culture;  Surgeon: Basilio Morris MD;  Location: [facility] OR;  Service:     INSERTION PERITONEAL DIALYSIS CATHETER N/A 12/16/2021    Procedure: INSERTION PERITONEAL DIALYSIS CATHETER LAPAROSCOPIC;  Surgeon: Edy Glover MD;  Location: [facility] OR;  Service: General;  Laterality: N/A;    KNEE ARTHROSCOPY Right     ORIF TIBIA FRACTURE Right 12/28/2018    Procedure: TIBIAL  FRACTURE OPEN REDUCTION INTERNAL FIXATION;  Surgeon: Jung Barragan MD;  Location: [facility] OR;  Service: Orthopedics    TOE AMPUTATION Right     5th    TUBAL ABDOMINAL LIGATION               IRF OT ASSESSMENT FLOWSHEET (last 12 hours)       IRF OT Evaluation and Treatment       Row Name 08/25/23 1439          OT Time and Intention    Document Type daily treatment  -BF     Mode of Treatment occupational therapy  -BF     Patient Effort good  -BF     Symptoms Noted During/After Treatment fatigue  -BF       Row Name 08/25/23 1439          General Information    Existing Precautions/Restrictions fall  peritoneal dialysis port at ABD, L patella subluxation  -BF       Row Name 08/25/23 1439          Cognition/Psychosocial    Orientation Status (Cognition) oriented x 3  -BF     Follows Commands (Cognition) follows one-step commands;verbal cues/prompting required  -BF       Row Name 08/25/23 1439          Grooming    [place] Level (Grooming) set up;supervision;verbal cues  -BF     Position (Grooming) supported sitting  -BF       Row Name 08/25/23 1439          Bed-Chair Transfer    Bed-Chair [place] (Transfers) minimum assist (75% patient effort);verbal cues;nonverbal cues (demo/gesture)  -BF     Assistive Device (Bed-Chair Transfers) wheelchair  -BF       Row Name 08/25/23 1439          Motor Skills    Motor  Control/Coordination Interventions fine motor manipulation/dexterity activities;gross motor coordination activities;therapeutic exercise/ROM  BUE GMC/FMC theract, strengthening; theraputty therex, 3 lb dowel therex, handgripper, wrist rolls  -       Row Name 08/25/23 1439          Positioning and Restraints    Post Treatment Position wheelchair  -     In Wheelchair sitting;with PT  after both sessions  -               User Key  (r) = Recorded By, (t) = Taken By, (c) = Cosigned By      Initials Name Effective Dates     Yael Mendez OT 07/11/23 -                      Occupational Therapy Education       Title: PT OT SLP Therapies (Done)       Topic: Occupational Therapy (Done)       Point: ADL training (Done)       Description:   Instruct learner(s) on proper safety adaptation and remediation techniques during self care or transfers.   Instruct in proper use of assistive devices.                  Learning Progress Summary             Patient Acceptance, E, VU,NR by  at 8/25/2023 1438    Acceptance, E, VU,NR by  at 8/24/2023 1450                         Point: Precautions (Done)       Description:   Instruct learner(s) on prescribed precautions during self-care and functional transfers.                  Learning Progress Summary             Patient Acceptance, E, VU,NR by  at 8/25/2023 1438    Acceptance, E, VU,NR by  at 8/24/2023 1450                                         User Key       Initials Effective Dates Name Provider Type Discipline     07/11/23 -  Yael Mendez OT Occupational Therapist OT                        OT Recommendation and Plan    Planned Therapy Interventions (OT): activity tolerance training, adaptive equipment training, BADL retraining, ROM/therapeutic exercise, strengthening exercise, transfer/mobility retraining                    Time Calculation:      Time Calculation- OT       Row Name 08/25/23 1444 08/25/23 0915          Time Calculation- OT    OT  Start Time 1245  -BF 0915  -BF     OT Stop Time 1330  -BF 1000  -BF     OT Time Calculation (min) 45 min  -BF 45 min  -BF     Total Timed Code Minutes- OT 45 minute(s)  -BF 45 minute(s)  -BF     OT Non-Billable Time (min) -- 10 min  -BF               User Key  (r) = Recorded By, (t) = Taken By, (c) = Cosigned By      Initials Name Provider Type     Yael Mendez, OT Occupational Therapist                  Therapy Charges for Today       Code Description Service Date Service Provider Modifiers Qty    76628502927 HC OT EVAL HIGH COMPLEXITY 3 8/24/2023 Yael Mendez, OT GO 1    61765998121 HC OT SELF CARE/MGMT/TRAIN EA 15 MIN 8/24/2023 Yael eMndez, OT GO 1    97864227294 HC OT THERAPEUTIC ACT EA 15 MIN 8/24/2023 Yael Mendez, OT GO 1    74388470207 HC OT THER PROC EA 15 MIN 8/24/2023 Yael Mendez, OT GO 1    67280864383 HC OT SELF CARE/MGMT/TRAIN EA 15 MIN 8/25/2023 Yael Mendez, OT GO 1    50829717217 HC OT THER PROC EA 15 MIN 8/25/2023 Yael Mendez, OT GO 2    71047846226 HC OT THERAPEUTIC ACT EA 15 MIN 8/25/2023 Yael Mendez, OT GO 3                     Yael Mendez, OT  8/25/2023

## 2023-08-25 NOTE — PROGRESS NOTES
Nephrology Progress Note      Subjective     Doing well, no chest pain or shortness of breath lying on bed comfortably    Objective       Vital signs :     Temp:  [97.9 °F (36.6 °C)-98.9 °F (37.2 °C)] 97.9 °F (36.6 °C)  Heart Rate:  [64-76] 64  Resp:  [16] 16  BP: (116-150)/(70-81) 116/70    Intake/Output                         08/23/23 0701 - 08/24/23 0700 08/24/23 0701 - 08/25/23 0700     [id number] 0575-0309 Total 8988-4760 0171-0630 Total                 Intake    P.O.  --  120 120  720  300 1020    Total Intake -- 120 120        Output    Dialysis  --  685 685  --  379 379    Total Output -- 685 685 -- 379 379             Physical Exam:    General Appearance : alert, pleasant, appears stated age, cooperative and alert  Lungs : clear to auscultation, respirations regular  Heart :  regular rhythm & normal rate, normal S1, S2 and no murmur, no rub  Abdomen : soft, non distended  Extremities : No edema,   Neurologic :   orientated to person, place, time and situation, Grossly no focal deficits        Laboratory Data :     Albumin Albumin   Date Value Ref Range Status   08/24/2023 2.7 (L) 3.5 - 5.2 g/dL Final        Magnesium Magnesium   Date Value Ref Range Status   08/24/2023 2.0 1.6 - 2.4 mg/dL Final            PTH               No results found for: PTH    CBC and coagulation:  Results from last 7 days   Lab Units 08/24/23  0100 08/20/23  0124 08/19/23  1109   WBC 10*3/mm3 4.96 5.94 5.95   HEMOGLOBIN g/dL 8.5* 8.0* 10.1*   HEMATOCRIT % 27.3* 25.3* 31.3*   MCV fL 95.5 94.4 92.6   MCHC g/dL 31.1* 31.6 32.3   PLATELETS 10*3/mm3 141 120* 106*   INR   --   --  0.85*     Acid/base balance:      Renal and electrolytes:    Results from last 7 days   Lab Units 08/24/23  0100 08/22/23  0139 08/21/23  0138 08/20/23  0124 08/19/23  1109   SODIUM mmol/L 133* 134* 137 138 138   POTASSIUM mmol/L 4.1 3.7 3.2* 3.6 4.0   MAGNESIUM mg/dL 2.0  --   --   --  2.2   CHLORIDE mmol/L 96* 97* 100 104 102   CO2 mmol/L 24.5  24.2 23.8 21.0* 22.2   BUN mg/dL 45* 41* 43* 49* 55*   CREATININE mg/dL 4.78* 4.97* 4.89* 4.89* 5.19*   CALCIUM mg/dL 7.4* 6.8* 7.2* 7.1* 7.4*   PHOSPHORUS mg/dL  --   --   --   --  6.4*     Estimated Creatinine Clearance: 10.4 mL/min (A) (by C-G formula based on SCr of 4.78 mg/dL (H)).  @GFRCG:3@   Liver and pancreatic function:  Results from last 7 days   Lab Units 08/24/23  0100 08/20/23  0124 08/19/23  1109   ALBUMIN g/dL 2.7* 2.6* 3.3*   BILIRUBIN mg/dL <0.2 0.2 0.3   ALK PHOS U/L 106 63 82   AST (SGOT) U/L 22 11 16   ALT (SGPT) U/L 14 9 13         Cardiac:      Liver and pancreatic function:  Results from last 7 days   Lab Units 08/24/23  0100 08/20/23  0124 08/19/23  1109   ALBUMIN g/dL 2.7* 2.6* 3.3*   BILIRUBIN mg/dL <0.2 0.2 0.3   ALK PHOS U/L 106 63 82   AST (SGOT) U/L 22 11 16   ALT (SGPT) U/L 14 9 13       Medications :     amLODIPine, 10 mg, Oral, Daily  aspirin, 81 mg, Oral, Daily  atorvastatin, 80 mg, Oral, Nightly  FLUoxetine, 40 mg, Oral, Daily  heparin (porcine), 5,000 Units, Subcutaneous, Q12H  insulin glargine, 6 Units, Subcutaneous, Nightly  insulin lispro, 2-7 Units, Subcutaneous, 4x Daily AC & at Bedtime  isosorbide mononitrate, 30 mg, Oral, Daily  levothyroxine, 25 mcg, Oral, Daily  metoprolol succinate XL, 25 mg, Oral, Daily  multivitamin, 1 tablet, Oral, Daily  pantoprazole, 40 mg, Oral, Daily  rOPINIRole, 0.5 mg, Oral, BID  traZODone, 50 mg, Oral, Nightly             Assessment & Plan     1.  End-stage renal disease on peritoneal dialysis  2.  Type 2 diabetes with nephropathy  3.  Chronic diarrhea now worse  4.  New onset hypothyroidism  5.  Frailty  6.  Hypertension  7.  Malnutrition  8.  Diabetic neuropathy and myopathy with inability to ambulate independently  9.  Anemia of CKD    Grossly stable from renal standpoint continue on nightly PD with 1.5% dialysate  Discussed in detail with Gisela PD nurse at [facility], there is no family member yet available to be trained for assistance in  peritoneal dialysis.  Patient is going to tell us back by Monday to anticipate discharge planning, if patient needs to be transitioned to hemodialysis before discharge      Nicholas Arroyo MD  08/25/23  08:35 EDT

## 2023-08-25 NOTE — SIGNIFICANT NOTE
08/25/23 1320   Plan   Plan Spoke to daughter Liza [phone] who says she is unable to miss work to do the training for PD and she says her sister-in-law was going to do the training then wanted someone else to train too if she was unable to.  Daughter has two young children and has to work.  Daughter says her father Cliff took care of her mother at home prior to his hospital admission and administered PD at home.  Discussed option of hemodialysis and pt getting HD catheter placement if she is unable to go home and receive PD.  Daughter wants her father to receive rehab and be able to assist her mother again if possible.  Discussed SNF placement, [place] Medicaid and potential financial obligations to NH if needed.  Reviewed team conference meeting on 8-29-23.  SS will follow and assist with discharge planning.

## 2023-08-25 NOTE — PROGRESS NOTES
PPS CMG Coordinator  Inpatient Rehabilitation Admission    Ethnic Group: White.  Marital Status:  Marital Status: .    IRF Admission Date:  08/23/2023  Admission Class: Initial Rehab.  Admit From:  [facility]    Pre-Hospital Living: Home. Pre-Hospital Living  With: (5) Other. significant  other    Payment Sources: Primary: Not Listed.  Secondary: Medicare Fee for Service  Impairment Group: 16 Debility (non-cardiac, non-pulmonary)  Date of Onset of Impairment: 08/19/2023    Etiologic Diagnosis Code(s):  Rank Code      Description  1    I13.2     Hypertensive heart and chronic kidney disease                 with heart failure and with stage 5 chronic                 kidney disease, or end stage renal disease    Comorbidities:      Height on Admission: 64 inches.  Weight on Admission: 124 pounds.    Are there any arthritis conditions recorded for Impairment Group, Etiologic  Diagnosis, or Comorbid Conditions that meet all of the regulatory requirements  for IRF classification (in 42 .29(b)(2)(x), (xi), and xii))?    LINO Bladder Accidents:  0 - Accidents.  Bladder Score = 7. Patient has not had an accident.  LINO Bowel Accident: 0 -Accidents.  Bowel Score = 7. Patient has no accidents.    Signed by: Leanna Maradiaga, Supervisor

## 2023-08-25 NOTE — PROGRESS NOTES
Physical Medicine and Rehabilitation  Inpatient Rehabilitation Interdisciplinary Plan of Care    Demographics            Age: 63Y            Gender: Female    Admission Date: 8/23/2023 7:00:00 PM  Rehabilitation Diagnosis:  debility    Plan of Care    Updated (if changes indicated)  No changes to plan.    Based on the patient's medical and functional status, their prognosis and  expected level of functional improvement is: good    Interdisciplinary Problem/Goals/Status  Copy from POCMobility    [PT] Bed/Chair/Wheelchair (Active)  Current Status (8/24/2023 12:00:00 AM): min A  Weekly Goal: MI  Discharge Goal: MI    [PT] Walk (Active)  Current Status (8/24/2023 12:00:00 AM): amb 70' RW CGA  Weekly Goal: amb 300' RW Sup  Discharge Goal: amb 300' RW Sup    Self Care    [OT] Toileting (Active)  Current Status (8/24/2023 12:00:00 AM): Mod A  Weekly Goal: Min A  Discharge Goal: CGA    Safety    [RN] Potential for Injury (Active)  Current Status (8/23/2023 7:00:00 PM): NO FALLS  Weekly Goal: NO FALLS  Discharge Goal: NO FALLS    Body Systems    [RN] Integumentary (Active)  Current Status (8/23/2023 7:00:00 PM): NO FURTHER BREAKDOWN  Weekly Goal: NO FURTHER BREAKDOWN  Discharge Goal: NO FURTHER BREAKDOWN    Medical Problems    Comments:    Signed by: Yeison Braden, Physician

## 2023-08-25 NOTE — THERAPY TREATMENT NOTE
Inpatient Rehabilitation - Physical Therapy Treatment Note       EDWARD Veliz     Patient Name: Reny Elena  : 1960  MRN: [id number]    Today's Date: 2023                    Admit Date: 2023      Visit Dx:   No diagnosis found.    Patient Active Problem List   Diagnosis    Tibia/fibula fracture    Iron deficiency anemia    Type 2 diabetes mellitus with hyperglycemia, with long-term current use of insulin    Wound of right ankle    Cellulitis    Metabolic encephalopathy    History of non-ST elevation myocardial infarction (NSTEMI)    HTN (hypertension)    CVA (cerebral vascular accident)    Chronic diastolic CHF (congestive heart failure)    Decubitus ulcer of coccyx, unspecified pressure ulcer stage    Depression    Expressive aphasia    Impaired mobility and ADLs    Hyperkalemia    Subdural hematoma    Severe malnutrition    Cerebrovascular accident (CVA), unspecified mechanism    PRES (posterior reversible encephalopathy syndrome)    Debility       Past Medical History:   Diagnosis Date    Arthritis     Closed fracture of right fibula and tibia 2018    Depression     Diabetic ulcer of left foot associated with type 2 diabetes mellitus 2017    Diastolic dysfunction     Disease of thyroid gland     Elevated cholesterol     End stage renal disease     Essential hypertension     GERD (gastroesophageal reflux disease)     History of transfusion     Hyperlipidemia     Iron deficiency anemia 2018    PAD (peripheral artery disease)     Renal failure     Type 2 diabetes mellitus with hyperglycemia, with long-term current use of insulin 2018       Past Surgical History:   Procedure Laterality Date    ABDOMINAL SURGERY      BACK SURGERY      Post spinal diskectomy, osteophytectomy in one lumbar interspace    CATARACT EXTRACTION      Left      SECTION      DENTAL PROCEDURE      ENDOSCOPY      FRACTURE SURGERY      right leg    HYSTERECTOMY      INCISION AND DRAINAGE LEG Left  4/15/2017    Procedure: INCISION AND DRAINAGE LOWER EXTREMITY;  Surgeon: Basilio Morris MD;  Location: [facility] OR;  Service:     INCISION AND DRAINAGE LEG Left 5/26/2017    Procedure: Irrigation and Debridment abcess diabetic wound left foot with deep culture;  Surgeon: Basilio Morris MD;  Location: [facility] OR;  Service:     INSERTION PERITONEAL DIALYSIS CATHETER N/A 12/16/2021    Procedure: INSERTION PERITONEAL DIALYSIS CATHETER LAPAROSCOPIC;  Surgeon: Edy Glover MD;  Location: [facility] OR;  Service: General;  Laterality: N/A;    KNEE ARTHROSCOPY Right     ORIF TIBIA FRACTURE Right 12/28/2018    Procedure: TIBIAL  FRACTURE OPEN REDUCTION INTERNAL FIXATION;  Surgeon: Jung Barragan MD;  Location: [facility] OR;  Service: Orthopedics    TOE AMPUTATION Right     5th    TUBAL ABDOMINAL LIGATION         PT ASSESSMENT (last 12 hours)       IRF PT Evaluation and Treatment       Row Name 08/25/23 1428          PT Time and Intention    Document Type daily treatment  -RG     Mode of Treatment individual therapy;physical therapy  -RG     Patient/Family/Caregiver Comments/Observations Pt and nursing in agreement for skilled PT on this date.  -RG       Row Name 08/25/23 1428          General Information    Existing Precautions/Restrictions fall  peritoneal dialysis port at ABD, L patella subluxation  -RG       Row Name 08/25/23 1428          Pain Scale: FACES Pre/Post-Treatment    Pain: FACES Scale, Pretreatment 4-->hurts little more  -RG     Posttreatment Pain Rating 4-->hurts little more  -RG       Row Name 08/25/23 1428          Cognition/Psychosocial    Affect/Mental Status (Cognition) WFL  -RG     Follows Commands (Cognition) verbal cues/prompting required;physical/tactile prompts required  -RG     Personal Safety Interventions gait belt;fall prevention program maintained;nonskid shoes/slippers when out of bed  -RG       Row Name 08/25/23 1428          Bed Mobility    Supine-Sit-Supine [place] (Bed Mobility) minimum  assist (75% patient effort);verbal cues;nonverbal cues (demo/gesture)  -RG       Row Name 08/25/23 1428          Bed-Chair Transfer    Bed-Chair [place] (Transfers) minimum assist (75% patient effort);verbal cues;nonverbal cues (demo/gesture)  -RG     Assistive Device (Bed-Chair Transfers) wheelchair  -RG       Row Name 08/25/23 1428          Chair-Bed Transfer    Chair-Bed [place] (Transfers) minimum assist (75% patient effort);verbal cues;nonverbal cues (demo/gesture)  -RG     Assistive Device (Chair-Bed Transfers) wheelchair  -RG       Row Name 08/25/23 1428          Sit-Stand Transfer    Sit-Stand [place] (Transfers) minimum assist (75% patient effort);verbal cues;nonverbal cues (demo/gesture)  -RG     Assistive Device (Sit-Stand Transfers) wheelchair;walker, front-wheeled  -RG       Row Name 08/25/23 1428          Stand-Sit Transfer    Stand-Sit [place] (Transfers) minimum assist (75% patient effort);verbal cues;nonverbal cues (demo/gesture)  -RG     Assistive Device (Stand-Sit Transfers) wheelchair;walker, front-wheeled  -RG       Row Name 08/25/23 1428          Safety Issues, Functional Mobility    Impairments Affecting Function (Mobility) balance;endurance/activity tolerance;pain;range of motion (ROM);strength  -       Row Name 08/25/23 1428          Motor Skills    Therapeutic Exercise hip;knee;ankle  -       Row Name 08/25/23 1428          Hip (Therapeutic Exercise)    Hip (Therapeutic Exercise) strengthening exercise  -RG     Hip Strengthening (Therapeutic Exercise) bilateral;flexion;aBduction;aDduction;external rotation;internal rotation;heel slides;supine;20 repititions  -       Row Name 08/25/23 1428          Knee (Therapeutic Exercise)    Knee (Therapeutic Exercise) strengthening exercise  -RG     Knee Strengthening (Therapeutic Exercise) bilateral;flexion;extension;heel slides;SLR (straight leg raise);SAQ (short arc quad);supine;20 repititions  -       Row Name 08/25/23  1428          Ankle (Therapeutic Exercise)    Ankle (Therapeutic Exercise) strengthening exercise  -RG     Ankle Strengthening (Therapeutic Exercise) bilateral;dorsiflexion;plantarflexion;supine;20 repititions  -RG       Row Name 08/25/23 1428          Positioning and Restraints    Pre-Treatment Position sitting in chair/recliner  -RG     Post Treatment Position bed  -RG     In Bed notified nsg;supine;call light within reach;encouraged to call for assist;legs elevated  -RG       Row Name 08/25/23 1428          Therapy Assessment/Plan (PT)    Patient's Goals For Discharge return home  -RG       Row Name 08/25/23 1428          Therapy Assessment/Plan (PT)    Rehab Potential/Prognosis (PT) adequate, monitor progress closely  -RG     Frequency of Treatment (PT) 5 times per week  -RG     Estimated Duration of Therapy (PT) 2 weeks  -RG     Problem List (PT) balance;mobility;range of motion (ROM);strength;pain  -RG     Activity Limitations Related to Problem List (PT) unable to ambulate safely;unable to transfer safely  -RG       Row Name 08/25/23 1428          Therapy Plan Review/Discharge Plan (PT)    Anticipated Equipment Needs at Discharge (PT Eval) --  tbd  -RG     Anticipated Discharge Disposition (PT) home with assist;home with home health;home with outpatient therapy services  -RG       Row Name 08/25/23 1428          IRF PT Goals    Bed Mobility Goal Selection (PT-IRF) bed mobility, PT goal 1  -RG     Transfer Goal Selection (PT-IRF) transfers, PT goal 1  -RG     Gait (Walking Locomotion) Goal Selection (PT-IRF) gait, PT goal 1  -RG     Stairs Goal Selection (PT-IRF) stairs, PT goal 1  -RG       Row Name 08/25/23 1428          Bed Mobility Goal 1 (PT-IRF)    Activity/Assistive Device (Bed Mobility Goal 1, PT-IRF) sit to supine/supine to sit  -RG     [place] Level (Bed Mobility Goal 1, PT-IRF) independent  -RG     Time Frame (Bed Mobility Goal 1, PT-IRF) by discharge  -       Row Name 08/25/23 1428           Transfer Goal 1 (PT-IRF)    Activity/Assistive Device (Transfer Goal 1, PT-IRF) sit-to-stand/stand-to-sit;bed-to-chair/chair-to-bed  -RG     [place] Level (Transfer Goal 1, PT-IRF) modified independence  -RG     Time Frame (Transfer Goal 1, PT-IRF) by discharge  -RG       Row Name 08/25/23 1428          Gait/Walking Locomotion Goal 1 (PT-IRF)    Activity/Assistive Device (Gait/Walking Locomotion Goal 1, PT-IRF) walker, rolling  -RG     Gait/Walking Locomotion Distance Goal 1 (PT-IRF) 300'  -RG     [place] Level (Gait/Walking Locomotion Goal 1, PT-IRF) supervision required  -RG     Time Frame (Gait/Walking Locomotion Goal 1, PT-IRF) by discharge  -RG       Row Name 08/25/23 1428          Stairs Goal 1 (PT-IRF)    Activity/Assistive Device (Stairs Goal 1, PT-IRF) ascending stairs;descending stairs;using handrail, left;using handrail, right  -RG     Number of Stairs (Stairs Goal 1, PT-IRF) 5  -RG     [place] Level (Stairs Goal 1, PT-IRF) supervision required  -RG     Time Frame (Stairs Goal 1, PT-IRF) by discharge  -RG               User Key  (r) = Recorded By, (t) = Taken By, (c) = Cosigned By      Initials Name Provider Type    Rajiv Garner PTA Physical Therapist Assistant                  Wound 08/23/23 1923 Right posterior heel (Active)   Pressure Injury Stage 1 08/25/23 1009   Dressing Appearance open to air 08/24/23 1905   Closure None 08/25/23 0939   Base non-blanchable;maroon/purple 08/25/23 0939   Drainage Amount none 08/25/23 0939   Care, Wound pressure removed;other (see comments) 08/24/23 1905   Dressing Care open to air 08/24/23 1905       Wound 08/23/23 2313 Left gluteal (Active)   Pressure Injury Stage 2 08/25/23 1009   Dressing Appearance open to air 08/24/23 1905   Closure None 08/25/23 0939   Base non-blanchable 08/25/23 0939   Periwound blistered 08/25/23 0939   Drainage Amount none 08/25/23 0939   Care, Wound barrier applied;pressure removed;other (see comments) 08/24/23 8164    Dressing Care open to air 08/24/23 1905     Physical Therapy Education       Title: PT OT SLP Therapies (Done)       Topic: Physical Therapy (Done)       Point: Mobility training (Done)       Learning Progress Summary             Patient Acceptance, E,D, VU,NR by RG at 8/25/2023 1433    Acceptance, E, VU,NR by LB at 8/24/2023 1149                         Point: Home exercise program (Done)       Learning Progress Summary             Patient Acceptance, E,D, VU,NR by RG at 8/25/2023 1433    Acceptance, E, VU,NR by LB at 8/24/2023 1149                         Point: Body mechanics (Done)       Learning Progress Summary             Patient Acceptance, E,D, VU,NR by RG at 8/25/2023 1433    Acceptance, E, VU,NR by LB at 8/24/2023 1149                         Point: Precautions (Done)       Learning Progress Summary             Patient Acceptance, E,D, VU,NR by RG at 8/25/2023 1433    Acceptance, E, VU,NR by LB at 8/24/2023 1149                                         User Key       Initials Effective Dates Name Provider Type Discipline     06/16/21 -  Christel Ferguson, PT Physical Therapist PT     06/16/21 -  Rajiv Pagan PTA Physical Therapist Assistant PT                    PT Recommendation and Plan    Frequency of Treatment (PT): 5 times per week  Anticipated Equipment Needs at Discharge (PT Eval):  (tbd)                  Time Calculation:      PT Charges       Row Name 08/25/23 1434             Time Calculation    Start Time 1330  -RG      Stop Time 1415  -RG      Time Calculation (min) 45 min  -RG      PT Received On 08/25/23  -RG         Time Calculation- PT    Total Timed Code Minutes- PT 45 minute(s)  -                User Key  (r) = Recorded By, (t) = Taken By, (c) = Cosigned By      Initials Name Provider Type    RG Rajiv Pagan, SHITAL Physical Therapist Assistant                    Therapy Charges for Today       Code Description Service Date Service Provider Modifiers Qty    78638414715  PT  THERAPEUTIC ACT EA 15 MIN 8/25/2023 Rajiv Pagan PTA GP, CQ 1    48892318662 HC PT THER PROC EA 15 MIN 8/25/2023 Rajiv Pagan PTA GP, CQ 2              PT G-Codes  AM-PAC 6 Clicks Score (PT): 17      Rajiv Pagan PTA  8/25/2023

## 2023-08-25 NOTE — SIGNIFICANT NOTE
08/25/23 1407   Plan   Plan Spoke to Gisela, PD nurse at [facility], [phone] about talking to pt and daughter.  Discharge plans will be determined based on if significant other is able to take care of pt when she is ready for discharge.  Will follow.

## 2023-08-25 NOTE — SIGNIFICANT NOTE
08/25/23 0935   Living Environment   People in Home significant other;other (see comments)   Name(s) of People in Home Significant other Cliff Leiva and Cliff's brother Nikita Leiva   Current Living Arrangements home   Potentially Unsafe Housing Conditions none   Primary Care Provided by spouse/significant other   Provides Primary Care For no one, unable/limited ability to care for self   Caregiving Concerns Significant other Cliff Leiva is an inpatient at [facility] and was recently on the ventilator.  Cliff assisted with pt's care and peritoneal dialysis at home.   Family Caregiver if Needed other relative(s)   Family Caregiver Names Pt says her daughter-in-law Leanna Elena is suppose to receive training for peritoneal dialysis.   Quality of Family Relationships involved   Able to Return to Prior Arrangements yes   Living Arrangement Comments SS spoke to pt who states being  and living with significant other Cliff Leiva and his brother Nikita Leiva.  Cliff is currently a patient at [facility]; pt says he was on a ventilator but is no longer on the vent and out of CCU.    Pt has two children: Liza Oliva ([place]) and Hiren Elena ([place]).   Daughter and son work.  Pt receives SSI disability, HUDD, and SNAP benefits.  Pt has [place] Medicaid and Medicare part B only.  Alexy delivers medications to pt's home.  PCP is DARIUS Jimenez.  Significant other Cliff Leiva is pt's healthcare surrogate and she has a living will.  Pt does not have a POA.  Pt receives Waiver services from [facility].  HH aide visits on Tuesday and Thursday for 2 hours to assist with bathing, homemaking, laundry and grocery shopping.  Significant other also assists with bathing, dressing and sometimes she needed help with toileting.  Pt used rollator for mobility, sometimes uses a standard cane and wheelchair when she goes somewhere.  Pt uses [facility] for MD appointments.   Resource/Environmental Concerns    Resource/Environmental Concerns none   Transportation Concerns none   Transition Planning   Patient/Family Anticipates Transition to home with help/services   Patient/Family Anticipated Services at Transition   (To be recommended closer to discharge date.)   Transportation Anticipated family or friend will provide   Discharge Needs Assessment (IRF)   Current Outpatient/Agency/Support Group other (see comments);homecare agency  ([facility] provides Waiver services.  Pt receives an aide 2 days per week x 2 hours and  visits 1 x month.  Pt receives peritoneal dialysis 7 days a week which was administered by significant other.)   Concerns to be Addressed discharge planning;adjustment to diagnosis/illness   Concerns Comments Pt's significant other is her caregiver and administers peritoneal dialysis; he is also in the hospital at this time.  Pt says daughter-in-law Leanna is suppose to get training for PD at home.  Spoke to Dr. Arroyo who says to verify if pt has someone to be trained for PD and if not she will need to receive hemodialysis and have a hemodialysis catheter placed.  Reviewed this information with pt.   Equipment Currently Used at Home wheelchair;cane, straight;glucometer;bp cuff;commode;shower chair;rollator  ([facility].  Pt has a rollator that is less than five years old.  She requests a rolling walker.  Explained if rollator is less than five years old insurance would not pay for another walker until 5 years have passed.)   Current Discharge Risk chronically ill   Discharge Coordination/Progress Pt wants to return home at discharge with daughter-in-law Leanna Elena administering peritoneal dialysis until significant other is able to administer again.  Son and daughter-in-law live two houses from them.  Pt says significant other's brother Nikita works for [employer] starting a 12 pm and get home at different times but he would help her with food and going to the  bathroom.  Team conference will be held on 8-29-23.  SS will follow and assist with discharge planning.

## 2023-08-25 NOTE — PROGRESS NOTES
Case Management  Inpatient Rehabilitation Plan of Care and Discharge Plan Note    Rehabilitation Diagnosis:  debility  Date of Onset:  8-19-23    Medical Summary:  debility, ESRD on PD, HTN, DM, electrolyte abnormalties,  hypothyroidism    Plan of Care      Expected Intensity:  Average of 3 hours of therapy 5 days/week.  Interdisciplinary Team:  Interdisciplinary Team: Medical Supervision and 24 Hour Rehabilitation Nursing.,  Physical Therapy:, Occupational Therapy:, Social Work, Therapeutic Recreation.  Physical Therapy Intensity/Duration: PT 1.5 hours per day/5 days per week  Occupational Therapy Intensity/Duration: OT 1.5 hours per day/5 days per week  Estimated Length of Stay/Anticipated Discharge Date: 14 days  Anticipated Discharge Destination:  Anticipated discharge destination from inpatient rehabilitation is community  discharge with assistance. Discharge plans will be determined based on  caregiving needs.  Pt's significant other is in the hospital and he assisted  with her care at home.      Based on the patient's medical and functional status, their prognosis and  expected level of functional improvement is:  good    Signed by: PA Arnold

## 2023-08-25 NOTE — SIGNIFICANT NOTE
08/25/23 1320   Plan   Plan Spoke to daughter Liza [phone] who says she is unable to miss work to do the training for PD and she says her sister-in-law was going to do the training then wanted someone else to train too if she was unable to.  Daughter has two young children and has to work.  Daughter says her father Cliff took care of her mother at home prior to his hospital admission and administered PD at home.  Discussed option of hemodialysis and pt getting HD catheter placement if she is unable to go home and receive PD.  Daughter wants her father to receive rehab and be able to assist her mother again if possible.  Discussed SNF placement, [place] Medicaid and potential financial obligations to NH if needed.  Reviewed team conference meeting on 8-29-23.  Discharge plans will be determined based on outcome of significant other's ability to assist pt at home when he is ready for discharge from hospital.  SS will follow and assist with discharge planning.

## 2023-08-25 NOTE — SIGNIFICANT NOTE
08/25/23 1030   Plan   Plan Attempted to contact daughter-in-law Leanna [phone] without success; voicemail is not set-up.  Contacted [facility] [phone] and spoke to Gisela, PD nurse who says daughter-in-law wanted someone else to train with her for administering peritoneal dialysis at home and pt's daughter Liza was unable to miss work to be trained, therefore, pt does not have anyone else to be trained.  Pt would have to receive outpatient hemodialysis and get a hemodialysis catheter placed if significant other is unable to provide PD at home.  Spoke to Breanna with [employer]  [phone] who confirmed pt receives Waiver services, aide 2 days a week x 2 hours each visit and  once per month.  Will follow.   Patient/Family in Agreement with Plan yes

## 2023-08-25 NOTE — SIGNIFICANT NOTE
08/25/23 1150   Plan   Plan Spoke to pt about talking to PD nurse at [facility] who says daughter-in-law Megan wanted someone else to train with for peritoneal dialysis and daughter is unable to miss work for training, therefore, there is no one else to train.  Discussed going to outpatient HD and hemodialysis catheter.  Pt will talking to Nephrologist about these issues.  Discussed option of going to SNF if significant other is unable to provide care when she is discharged.  SS will follow and assist with discharge planning.   Patient/Family in Agreement with Plan yes

## 2023-08-25 NOTE — NURSING NOTE
Wound consult for denuded area to the LT gluteal and RT heel. Heel presents as a stage 1 pressure ulcer with a non blanchable jeremiah wound wound skin and and a pin point opening. Buttock presents as a stage 2 with a partial thickness loss of the epidermis and a non blanchable jeremiah wound. No pain reported. New order for Venelex to both areas and leave open to air. Pressure injury prevention protocols in place.

## 2023-08-25 NOTE — PROGRESS NOTES
Occupational Therapy:    Physical Therapy: Individual: 90 minutes.    Speech Language Pathology:    Signed by: Rajiv Pagan PTA

## 2023-08-25 NOTE — PROGRESS NOTES
Patient Assessment Instrument  Quality Indicators - Admission FY 2023    Section A. Ethnicity/ Race/Language  Ethnicity:  Race:  Preferred Language:    Section A. Transportation      Section B. Hearing and Vision        Section B. Health Literacy        Section C. Cognitive Patterns      Section C. Signs and Symptoms of Delirium (from CAM)      Section D. Mood      Section D. Social Isolation      Section [id number]. Prior Functioning    Self Care: Patient needed partial assistance from another person to complete any  activities.  Indoor Mobility: Patient needed partial assistance from another person to  complete activities.  Stairs: Patient needed partial assistance from another person to complete  activities.  Functional Cognition: Patient needed partial assistance from another person to  complete activities.    Section [id number]. Prior Device Use      Section [id number]. Self Care Performance      Section [id number]. Self Care Discharge Goals      Section [id number]. Mobility Performance      Section [id number]. Mobility Discharge Goals      Section H. Bladder and Bowel  Bladder Continence: Always continent (no documented incontinence).  Bowel Continence: Always continent (no documented incontinence).    Section I. Active Diagnosis  Comorbidities and Co-existing Conditions:   Diabetes Mellitus (DM) - e.g.,  diabetic retinopathy, nephropathy, and neuropathy).    Section J. Health Conditions  Patient has had two or more falls, or a fall with injury, in the past year.  Patient has not had major surgery during the 100 days prior to admission.    Section J. Health Conditions (Pain)      Section K. Swallowing/Nutritional Status  Regular food (solids and liquids swallowed safely without supervision or  modified food or liquid consistency).  Nutritional Approaches on Admission:  Nutritional Approaches on Admission:  Therapeutic diet (e.g., low salt, diabetic, low cholesterol), None    Section M. Skin Conditions  Unhealed Pressure  Ulcer/Injuries at Stage 1 or Higher on Admission:  Yes.  Number of Unhealed Stage 1 Pressure Injuries: 0  Number of Unhealed Stage 2: 0  Number of Unhealed Stage 3: 0  Number of Unhealed Stage 4: 0  Number of Unhealed Unstageable Due to Non-removable Dressing/Device: 0  Number of Unhealed Unstageable Due to Slough/Eschar: 0  Number of Unhealed Unstageable Pressure Injuries Presenting as Deep Tissue  Injury: 1    Section N. Medication    Potential Clinically Significant Medication Issues: No issues found during  review  Patient is taking medications in the following pharmacological classification:  E. Anticoagulant An indication is noted for all medications in the Anticoagulant  drug class. I. Antiplatelet An indication is noted for all medications in the  Antiplatelet drug class. J. Hypoglycemic (including insulin) An indication is  noted for all medications in the Hypoglycemic drug class. H. Opioid An  indication is noted for all medications in the Opiod drug class.  Potential Clinically Significant Medication Issues: No issues found during  review    Section O. Special Treatments, Procedures, and Programs    Signed by: Leanna Maradiaga, Supervisor

## 2023-08-25 NOTE — THERAPY TREATMENT NOTE
Inpatient Rehabilitation - Physical Therapy Treatment Note       EDWARD Veliz     Patient Name: Reny Elena  : 1960  MRN: [id number]    Today's Date: 2023                    Admit Date: 2023      Visit Dx:   No diagnosis found.    Patient Active Problem List   Diagnosis    Tibia/fibula fracture    Iron deficiency anemia    Type 2 diabetes mellitus with hyperglycemia, with long-term current use of insulin    Wound of right ankle    Cellulitis    Metabolic encephalopathy    History of non-ST elevation myocardial infarction (NSTEMI)    HTN (hypertension)    CVA (cerebral vascular accident)    Chronic diastolic CHF (congestive heart failure)    Decubitus ulcer of coccyx, unspecified pressure ulcer stage    Depression    Expressive aphasia    Impaired mobility and ADLs    Hyperkalemia    Subdural hematoma    Severe malnutrition    Cerebrovascular accident (CVA), unspecified mechanism    PRES (posterior reversible encephalopathy syndrome)    Debility       Past Medical History:   Diagnosis Date    Arthritis     Closed fracture of right fibula and tibia 2018    Depression     Diabetic ulcer of left foot associated with type 2 diabetes mellitus 2017    Diastolic dysfunction     Disease of thyroid gland     Elevated cholesterol     End stage renal disease     Essential hypertension     GERD (gastroesophageal reflux disease)     History of transfusion     Hyperlipidemia     Iron deficiency anemia 2018    PAD (peripheral artery disease)     Renal failure     Type 2 diabetes mellitus with hyperglycemia, with long-term current use of insulin 2018       Past Surgical History:   Procedure Laterality Date    ABDOMINAL SURGERY      BACK SURGERY      Post spinal diskectomy, osteophytectomy in one lumbar interspace    CATARACT EXTRACTION      Left      SECTION      DENTAL PROCEDURE      ENDOSCOPY      FRACTURE SURGERY      right leg    HYSTERECTOMY      INCISION AND DRAINAGE LEG Left  4/15/2017    Procedure: INCISION AND DRAINAGE LOWER EXTREMITY;  Surgeon: Basilio Morris MD;  [facility];  Service:     INCISION AND DRAINAGE LEG Left 5/26/2017    Procedure: Irrigation and Debridment abcess diabetic wound left foot with deep culture;  Surgeon: Basilio Morris MD;  [facility];  Service:     INSERTION PERITONEAL DIALYSIS CATHETER N/A 12/16/2021    Procedure: INSERTION PERITONEAL DIALYSIS CATHETER LAPAROSCOPIC;  Surgeon: Edy Glover MD;  [facility];  Service: General;  Laterality: N/A;    KNEE ARTHROSCOPY Right     ORIF TIBIA FRACTURE Right 12/28/2018    Procedure: TIBIAL  FRACTURE OPEN REDUCTION INTERNAL FIXATION;  Surgeon: Jung Barragan MD;  [facility];  Service: Orthopedics    TOE AMPUTATION Right     5th    TUBAL ABDOMINAL LIGATION         PT ASSESSMENT (last 12 hours)       IRF PT Evaluation and Treatment       Row Name 08/25/23 1520 08/25/23 1428       PT Time and Intention    Document Type daily treatment  AM session  - daily treatment  -RG    Mode of Treatment individual therapy;physical therapy  -LL individual therapy;physical therapy  -RG    Patient/Family/Caregiver Comments/Observations Patient had no new c/o and was agreeable to AM PT session.  -LL Pt and nursing in agreement for skilled PT on this date.  -RG      Row Name 08/25/23 1520 08/25/23 1428       General Information    Existing Precautions/Restrictions fall  peritoneal dialysis port at ABD, L patella subluxation  -LL fall  peritoneal dialysis port at [facility], L patella subluxation  -RG      Row Name 08/25/23 1520 08/25/23 1428       Pain Scale: FACES Pre/Post-Treatment    Pain: FACES Scale, Pretreatment 4-->hurts little more  -LL 4-->hurts little more  -RG    Posttreatment Pain Rating 4-->hurts little more  -LL 4-->hurts little more  -RG      Row Name 08/25/23 1520 08/25/23 1428       Cognition/Psychosocial    Affect/Mental Status (Cognition) WFL  -LL WFL  -RG    Orientation Status  (Cognition) oriented x 3  -LL --    Follows Commands (Cognition) verbal cues/prompting required;physical/tactile prompts required  -LL verbal cues/prompting required;physical/tactile prompts required  -RG    Personal Safety Interventions fall prevention program maintained;gait belt;nonskid shoes/slippers when out of bed;supervised activity  -LL gait belt;fall prevention program maintained;nonskid shoes/slippers when out of bed  -RG    Cognitive Function WFL  -LL --      Row Name 08/25/23 1520 08/25/23 1428       Bed Mobility    Supine-Sit-Supine [place] (Bed Mobility) minimum assist (75% patient effort);verbal cues;nonverbal cues (demo/gesture)  -LL minimum assist (75% patient effort);verbal cues;nonverbal cues (demo/gesture)  -      Row Name 08/25/23 1428          Bed-Chair Transfer    Bed-Chair [place] (Transfers) minimum assist (75% patient effort);verbal cues;nonverbal cues (demo/gesture)  -RG     Assistive Device (Bed-Chair Transfers) wheelchair  -       Row Name 08/25/23 1520 08/25/23 1428       Chair-Bed Transfer    Chair-Bed [place] (Transfers) minimum assist (75% patient effort);verbal cues;nonverbal cues (demo/gesture);moderate assist (50% patient effort)  -LL minimum assist (75% patient effort);verbal cues;nonverbal cues (demo/gesture)  -RG    Assistive Device (Chair-Bed Transfers) wheelchair  -LL wheelchair  -      Row Name 08/25/23 1520 08/25/23 1428       Sit-Stand Transfer    Sit-Stand [place] (Transfers) minimum assist (75% patient effort);verbal cues;nonverbal cues (demo/gesture);moderate assist (50% patient effort)  -LL minimum assist (75% patient effort);verbal cues;nonverbal cues (demo/gesture)  -RG    Assistive Device (Sit-Stand Transfers) wheelchair;walker, front-wheeled  -LL wheelchair;walker, front-wheeled  -RG      Row Name 08/25/23 1520 08/25/23 1428       Stand-Sit Transfer    Stand-Sit [place] (Transfers) minimum assist (75% patient effort);verbal  cues;nonverbal cues (demo/gesture);moderate assist (50% patient effort)  -LL minimum assist (75% patient effort);verbal cues;nonverbal cues (demo/gesture)  -RG    Assistive Device (Stand-Sit Transfers) wheelchair;walker, front-wheeled  -LL wheelchair;walker, front-wheeled  -RG      Row Name 08/25/23 1520          Gait/Stairs (Locomotion)    [place] Level (Gait) minimum assist (75% patient effort);verbal cues;nonverbal cues (demo/gesture)  -LL     Assistive Device (Gait) walker, front-wheeled  -LL     Distance in Feet (Gait) 60' x 2  -LL     Pattern (Gait) step-through  -LL     Deviations/Abnormal Patterns (Gait) casa decreased;gait speed decreased;stride length decreased  -LL     Bilateral Gait Deviations forward flexed posture;heel strike decreased  -LL       Row Name 08/25/23 1520 08/25/23 1428       Safety Issues, Functional Mobility    Impairments Affecting Function (Mobility) balance;endurance/activity tolerance;pain;range of motion (ROM);strength  -LL balance;endurance/activity tolerance;pain;range of motion (ROM);strength  -      Row Name 08/25/23 1428          Motor Skills    Therapeutic Exercise hip;knee;ankle  -       Row Name 08/25/23 1520 08/25/23 1428       Hip (Therapeutic Exercise)    Hip (Therapeutic Exercise) -- strengthening exercise  -    Hip Strengthening (Therapeutic Exercise) bilateral;flexion;extension;aBduction;aDduction;marching while seated;sitting;resistance band;red  -LL bilateral;flexion;aBduction;aDduction;external rotation;internal rotation;heel slides;supine;20 repititions  -      Row Name 08/25/23 1520 08/25/23 1428       Knee (Therapeutic Exercise)    Knee (Therapeutic Exercise) -- strengthening exercise  -RG    Knee Strengthening (Therapeutic Exercise) bilateral;flexion;extension;marching while seated;LAQ (long arc quad);hamstring curls;sitting;resistance band;red  -LL bilateral;flexion;extension;heel slides;SLR (straight leg raise);SAQ (short arc quad);supine;20  repititions  -RG      Row Name 08/25/23 1520 08/25/23 1428       Ankle (Therapeutic Exercise)    Ankle (Therapeutic Exercise) -- strengthening exercise  -RG    Ankle Strengthening (Therapeutic Exercise) bilateral;dorsiflexion;plantarflexion;sitting  -LL bilateral;dorsiflexion;plantarflexion;supine;20 repititions  -RG      Row Name 08/25/23 1520 08/25/23 1428       Positioning and Restraints    Pre-Treatment Position --  WC w/ OT  -LL sitting in chair/recliner  -RG    Post Treatment Position bed  -LL bed  -RG    In Bed supine;call light within reach;encouraged to call for assist;side rails up x2;heels elevated  -LL notified nsg;supine;call light within reach;encouraged to call for assist;legs elevated  -RG      Row Name 08/25/23 1520 08/25/23 1428       Therapy Assessment/Plan (PT)    Patient's Goals For Discharge return home  -LL return home  -RG      Row Name 08/25/23 1520 08/25/23 1428       Therapy Assessment/Plan (PT)    Rehab Potential/Prognosis (PT) adequate, monitor progress closely  -LL adequate, monitor progress closely  -RG    Frequency of Treatment (PT) 5 times per week  -LL 5 times per week  -RG    Estimated Duration of Therapy (PT) 2 weeks  -LL 2 weeks  -RG    Problem List (PT) balance;mobility;range of motion (ROM);strength;pain  -LL balance;mobility;range of motion (ROM);strength;pain  -RG    Activity Limitations Related to Problem List (PT) unable to ambulate safely;unable to transfer safely  -LL unable to ambulate safely;unable to transfer safely  -RG      Row Name 08/25/23 1520 08/25/23 1428       Therapy Plan Review/Discharge Plan (PT)    Anticipated Equipment Needs at Discharge (PT Eval) --  tbd  -LL --  tbd  -RG    Anticipated Discharge Disposition (PT) home with assist;home with home health;home with outpatient therapy services  -LL home with assist;home with home health;home with outpatient therapy services  -RG      Row Name 08/25/23 1520 08/25/23 1428       IRF PT Goals    Bed Mobility Goal  Selection (PT-IRF) bed mobility, PT goal 1  -LL bed mobility, PT goal 1  -RG    Transfer Goal Selection (PT-IRF) transfers, PT goal 1  -LL transfers, PT goal 1  -RG    Gait (Walking Locomotion) Goal Selection (PT-IRF) gait, PT goal 1  -LL gait, PT goal 1  -RG    Stairs Goal Selection (PT-IRF) stairs, PT goal 1  -LL stairs, PT goal 1  -RG      Row Name 08/25/23 1520 08/25/23 1428       Bed Mobility Goal 1 (PT-IRF)    Activity/Assistive Device (Bed Mobility Goal 1, PT-IRF) sit to supine/supine to sit  -LL sit to supine/supine to sit  -RG    [place] Level (Bed Mobility Goal 1, PT-IRF) independent  -LL independent  -RG    Time Frame (Bed Mobility Goal 1, PT-IRF) by discharge  - by discharge  -RG      Row Name 08/25/23 1520 08/25/23 1428       Transfer Goal 1 (PT-IRF)    Activity/Assistive Device (Transfer Goal 1, PT-IRF) sit-to-stand/stand-to-sit;bed-to-chair/chair-to-bed  -LL sit-to-stand/stand-to-sit;bed-to-chair/chair-to-bed  -RG    [place] Level (Transfer Goal 1, PT-IRF) modified independence  -LL modified independence  -RG    Time Frame (Transfer Goal 1, PT-IRF) by discharge  - by discharge  -RG      Row Name 08/25/23 1520 08/25/23 1428       Gait/Walking Locomotion Goal 1 (PT-IRF)    Activity/Assistive Device (Gait/Walking Locomotion Goal 1, PT-IRF) walker, rolling  -LL walker, rolling  -RG    Gait/Walking Locomotion Distance Goal 1 (PT-IRF) 300'  -'  -RG    [place] Level (Gait/Walking Locomotion Goal 1, PT-IRF) supervision required  -LL supervision required  -RG    Time Frame (Gait/Walking Locomotion Goal 1, PT-IRF) by discharge  - by discharge  -RG      Row Name 08/25/23 1520 08/25/23 1428       Stairs Goal 1 (PT-IRF)    Activity/Assistive Device (Stairs Goal 1, PT-IRF) ascending stairs;descending stairs;using handrail, left;using handrail, right  -LL ascending stairs;descending stairs;using handrail, left;using handrail, right  -RG    Number of Stairs (Stairs Goal 1, PT-IRF) 5  -LL 5   -RG    [place] Level (Stairs Goal 1, PT-IRF) supervision required  -LL supervision required  -RG    Time Frame (Stairs Goal 1, PT-IRF) by discharge  -LL by discharge  -RG              User Key  (r) = Recorded By, (t) = Taken By, (c) = Cosigned By      Initials Name Provider Type    LL Carly Lundberg, SHITAL Physical Therapist Assistant    RG Rajiv Pagan PTA Physical Therapist Assistant                  Wound 08/23/23 1923 Right posterior heel (Active)   Pressure Injury Stage 1 08/25/23 1009   Dressing Appearance open to air 08/24/23 1905   Closure None 08/25/23 0939   Base non-blanchable;maroon/purple 08/25/23 0939   Drainage Amount none 08/25/23 0939   Care, Wound pressure removed;other (see comments) 08/24/23 1905   Dressing Care open to air 08/24/23 1905       Wound 08/23/23 2313 Left gluteal (Active)   Pressure Injury Stage 2 08/25/23 1009   Dressing Appearance open to air 08/24/23 1905   Closure None 08/25/23 0939   Base non-blanchable 08/25/23 0939   Periwound blistered 08/25/23 0939   Drainage Amount none 08/25/23 0939   Care, Wound barrier applied;pressure removed;other (see comments) 08/24/23 1905   Dressing Care open to air 08/24/23 1905     Physical Therapy Education       Title: PT OT SLP Therapies (Done)       Topic: Physical Therapy (Done)       Point: Mobility training (Done)       Learning Progress Summary             Patient Acceptance, E,D, VU,NR by LL at 8/25/2023 1525    Acceptance, E,D, VU,NR by RG at 8/25/2023 1433    Acceptance, E, VU,NR by LB at 8/24/2023 1149                         Point: Home exercise program (Done)       Learning Progress Summary             Patient Acceptance, E,D, VU,NR by LL at 8/25/2023 1525    Acceptance, E,D, VU,NR by RG at 8/25/2023 1433    Acceptance, E, VU,NR by LB at 8/24/2023 1149                         Point: Body mechanics (Done)       Learning Progress Summary             Patient Acceptance, E,D, VU,NR by LL at 8/25/2023 1525    Acceptance, E,D,  VU,NR by RG at 8/25/2023 1433    Acceptance, E, VU,NR by LB at 8/24/2023 1149                         Point: Precautions (Done)       Learning Progress Summary             Patient Acceptance, E,D, VU,NR by  at 8/25/2023 1525    Acceptance, E,D, VU,NR by RG at 8/25/2023 1433    Acceptance, E, VU,NR by LB at 8/24/2023 1149                                         User Key       Initials Effective Dates Name Provider Type Discipline     06/16/21 -  Christel Ferguson, PT Physical Therapist PT     05/02/16 -  Carly Lundberg PTA Physical Therapist Assistant PT    RG 06/16/21 -  Rajiv Pagan PTA Physical Therapist Assistant PT                    PT Recommendation and Plan    Frequency of Treatment (PT): 5 times per week  Anticipated Equipment Needs at Discharge (PT Eval):  (tbd)                  Time Calculation:      PT Charges       Row Name 08/25/23 1525 08/25/23 1434          Time Calculation    Start Time 1000  -LL 1330  -RG     Stop Time 1045  -LL 1415  -RG     Time Calculation (min) 45 min  -LL 45 min  -RG     PT Received On -- 08/25/23  -RG        Time Calculation- PT    Total Timed Code Minutes- PT 45 minute(s)  -LL 45 minute(s)  -RG               User Key  (r) = Recorded By, (t) = Taken By, (c) = Cosigned By      Initials Name Provider Type    LL Carly Lundberg PTA Physical Therapist Assistant     Rajiv Pagan PTA Physical Therapist Assistant                    Therapy Charges for Today       Code Description Service Date Service Provider Modifiers Qty    96018311625 HC GAIT TRAINING EA 15 MIN 8/25/2023 Carly Lundberg PTA GP, CQ 1    45390281073 HC PT THER PROC EA 15 MIN 8/25/2023 Carly Lundberg PTA GP, CQ 2              PT G-Codes  AM-PAC 6 Clicks Score (PT): 17      Coral Lundberg PTA  8/25/2023

## 2023-08-25 NOTE — PROGRESS NOTES
[facility]  PROGRESS NOTE     Patient Identification:  Name:  Reny Elena  Age:  63 y.o.  Sex:  female  :  1960  MRN:  [id number]  Visit Number:  [id number]  ROOM: 106[facility]     Primary Care Provider:  Susan Stephens APRN    Length of stay in inpatient status:  2    Subjective     Chief Compliant:  No chief complaint on file.      History of Presenting Illness: 63-year-old female being treated for debility.  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  Patient states she had a reasonably good night and having no acute complaints this morning.  Patient states that therapy seem to go well yesterday.    Objective     Current Hospital Meds:amLODIPine, 10 mg, Oral, Daily  aspirin, 81 mg, Oral, Daily  atorvastatin, 80 mg, Oral, Nightly  FLUoxetine, 40 mg, Oral, Daily  heparin (porcine), 5,000 Units, Subcutaneous, Q12H  insulin glargine, 6 Units, Subcutaneous, Nightly  insulin lispro, 2-7 Units, Subcutaneous, 4x Daily AC & at Bedtime  isosorbide mononitrate, 30 mg, Oral, Daily  levothyroxine, 25 mcg, Oral, Daily  metoprolol succinate XL, 25 mg, Oral, Daily  multivitamin, 1 tablet, Oral, Daily  pantoprazole, 40 mg, Oral, Daily  rOPINIRole, 0.5 mg, Oral, BID  traZODone, 50 mg, Oral, Nightly       ----------------------------------------------------------------------------------------------------------------------  Vital Signs:  Temp:  [97.9 °F (36.6 °C)-98.9 °F (37.2 °C)] 97.9 °F (36.6 °C)  Heart Rate:  [64-76] 64  Resp:  [16] 16  BP: (116-150)/(70-81) 116/70  SpO2:  [98 %] 98 %  on  Flow (L/min):  [1] 1;   Device (Oxygen Therapy): nasal cannula  Body mass index is 21.28 kg/m².    Wt Readings from Last 3 Encounters:   23 56.2 kg (124 lb)   23 56.7 kg (124 lb 14.4 oz)   23 42.2 kg (93  lb)     Intake & Output (last 3 days)         08/22 0701 08/23 0700 08/23 0701 08/24 0700 08/24 0701 08/25 0700 08/25 0701 08/26 0700    P.O.  120 1140 240    Total Intake(mL/kg)  120 (2.1) 1140 (20.3) 240 (4.3)    Urine (mL/kg/hr)  0 0 (0)     Stool   0     Dialysis  685 379     Total Output  685 379     Net  -565 +761 +240            Urine Unmeasured Occurrence  2 x 3 x     Stool Unmeasured Occurrence   2 x           Diet: Regular/House Diet, Diabetic Diets; Consistent Carbohydrate; Texture: Regular Texture (IDDSI 7); Fluid Consistency: Thin (IDDSI 0)  ----------------------------------------------------------------------------------------------------------------------  Physical exam:  Constitutional: No acute distress  HEENT: Normocephalic atraumatic  Neck: Supple\  Cardiovascular: Regular rate and rhythm  Pulmonary/Chest: Clear to auscultation  Abdominal: Positive bowel sounds soft.   Musculoskeletal: No arthropathy  Neurological: Generalized weakness  Skin: No rash  Peripheral vascular: No edema  Genitourinary:  ----------------------------------------------------------------------------------------------------------------------    Last echocardiogram:  Results for orders placed during the hospital encounter of 08/19/23    Adult Transthoracic Echo Complete W/ Cont if Necessary Per Protocol    Interpretation Summary    Left ventricular systolic function is normal. Left ventricular ejection fraction appears to be 56 - 60%.    Left ventricular diastolic function is consistent with (grade I) impaired relaxation.    Left atrial volume is mildly increased.    Moderate mitral valve stenosis is present.    Estimated right ventricular systolic pressure from tricuspid regurgitation is mildly elevated (35-45 mmHg).    Mild pulmonary hypertension is present.    There is no evidence of pericardial  effusion.    ----------------------------------------------------------------------------------------------------------------------  Results from last 7 days   Lab Units 08/24/23  0100 08/20/23  0124 08/19/23  1109   WBC 10*3/mm3 4.96 5.94 5.95   HEMOGLOBIN g/dL 8.5* 8.0* 10.1*   HEMATOCRIT % 27.3* 25.3* 31.3*   MCV fL 95.5 94.4 92.6   MCHC g/dL 31.1* 31.6 32.3   PLATELETS 10*3/mm3 141 120* 106*   INR   --   --  0.85*         Results from last 7 days   Lab Units 08/24/23  0100 08/22/23  0139 08/21/23  0138 08/20/23  0124 08/19/23  1109   SODIUM mmol/L 133* 134* 137 138 138   POTASSIUM mmol/L 4.1 3.7 3.2* 3.6 4.0   MAGNESIUM mg/dL 2.0  --   --   --  2.2   CHLORIDE mmol/L 96* 97* 100 104 102   CO2 mmol/L 24.5 24.2 23.8 21.0* 22.2   BUN mg/dL 45* 41* 43* 49* 55*   CREATININE mg/dL 4.78* 4.97* 4.89* 4.89* 5.19*   CALCIUM mg/dL 7.4* 6.8* 7.2* 7.1* 7.4*   PHOSPHORUS mg/dL  --   --   --   --  6.4*   GLUCOSE mg/dL 151* 228* 209* 366* 264*   ALBUMIN g/dL 2.7*  --   --  2.6* 3.3*   BILIRUBIN mg/dL <0.2  --   --  0.2 0.3   ALK PHOS U/L 106  --   --  63 82   AST (SGOT) U/L 22  --   --  11 16   ALT (SGPT) U/L 14  --   --  9 13   Estimated Creatinine Clearance: 10.4 mL/min (A) (by C-G formula based on SCr of 4.78 mg/dL (H)).  No results found for: AMMONIA  Results from last 7 days   Lab Units 08/19/23  1414 08/19/23  1109   HSTROP T ng/L 150* 158*             Glucose   Date/Time Value Ref Range Status   08/25/2023 0649 186 (H) 70 - 130 mg/dL Final   08/24/2023 1924 341 (H) 70 - 130 mg/dL Final   08/24/2023 1552 290 (H) 70 - 130 mg/dL Final   08/24/2023 1130 273 (H) 70 - 130 mg/dL Final   08/24/2023 0552 242 (H) 70 - 130 mg/dL Final   08/23/2023 1911 203 (H) 70 - 130 mg/dL Final   08/23/2023 1618 179 (H) 70 - 130 mg/dL Final   08/23/2023 1052 199 (H) 70 - 130 mg/dL Final     Lab Results   Component Value Date    .900 (H) 08/19/2023    FREET4 0.39 (L) 08/19/2023     No results found for: PREGTESTUR, PREGSERUM, HCG,  HCGQUANT  Pain Management Panel  More data exists         Latest Ref Rng & Units 3/5/2023 5/11/2022   Pain Management Panel   Amphetamine, Urine Qual Negative Negative  Negative    Barbiturates Screen, Urine Negative Negative  Negative    Benzodiazepine Screen, Urine Negative Negative  Positive    Buprenorphine, Screen, Urine Negative Negative  Negative    Cocaine Screen, Urine Negative Negative  Negative    Methadone Screen , Urine Negative Negative  Negative    Methamphetamine, Ur Negative Negative  Negative      Brief Urine Lab Results  (Last result in the past 365 days)        Color   Clarity   Blood   Leuk Est   Nitrite   Protein   CREAT   Urine HCG        08/19/23 1337 Yellow   Clear   Negative   Negative   Negative   100 mg/dL (2+)                 No results found for: BLOODCX  Results from last 7 days   Lab Units 08/19/23  1337   NITRITE UA  Negative   WBC UA /HPF 3-5*   BACTERIA UA /HPF None Seen   SQUAM EPITHEL UA /HPF 0-2     No results found for: URINECX  No results found for: WOUNDCX  No results found for: STOOLCX        I have personally looked at the labs and they are summarized above.  ----------------------------------------------------------------------------------------------------------------------  Detailed radiology reports for the last 24 hours:    Imaging Results (Last 24 Hours)       ** No results found for the last 24 hours. **          Final impressions for the last 30 days of radiology reports:    XR Chest AP    Result Date: 8/19/2023   No evidence of acute disease in the chest.  This report was finalized on 8/19/2023 2:53 PM by Steven Melton MD.     I have personally looked at the radiology images and read the final radiology report.    Assessment & Plan    Debility--yesterday was evaluated by therapy and is requiring moderate assistance for bathing; set up for upper body dressing; minimum assist for lower body dressing; set up for grooming; moderate assistance required for toileting.   Requiring minimum assist for bed mobility; minimum assist for bed to chair and chair to bed transfers.  Minimum assist for sit to stand and stand to sit transfers; ambulated 60 feet and 70 feet with front wheel walker and contact-guard yesterday.    End-stage renal disease requiring peritoneal dialysis--patient did receive dialysis last evening and will continue per nephrology recommendations.    Hypertension reasonably well-controlled at this time continue Norvasc and metoprolol.    Diabetes mellitus continue Levemir 10 units nightly and preprandial dosing of NovoLog.    Chronic neuropathic pain continue gabapentin as needed Norco    History of CVA continue high-dose statin therapy and aspirin therapy.    Hypothyroidism Synthroid 25 mcg daily    CHF with preserved EF continue beta-blocker and Imdur therapy.  Patient not a good candidate for ACE inhibitors nor ARB secondary to renal disease    GERD continue PPI    Chronic anemia stable    PAD continue aspirin and high-dose statin therapy.    VTE Prophylaxis:   Mechanical Order History:       None          Pharmalogical Order History:        Ordered     Dose Route Frequency Stop    08/24/23 1130  heparin (porcine) 5000 UNIT/ML injection 5,000 Units         5,000 Units SC Every 12 Hours Scheduled --                        Yeison Braden MD  [facility] Hospitalist  08/25/23  10:47 EDT

## 2023-08-26 LAB
ANION GAP SERPL CALCULATED.3IONS-SCNC: 11.7 MMOL/L (ref 5–15)
BASOPHILS # BLD AUTO: 0.04 10*3/MM3 (ref 0–0.2)
BASOPHILS NFR BLD AUTO: 0.7 % (ref 0–1.5)
BUN SERPL-MCNC: 61 MG/DL (ref 8–23)
BUN/CREAT SERPL: 13 (ref 7–25)
CALCIUM SPEC-SCNC: 7.4 MG/DL (ref 8.6–10.5)
CHLORIDE SERPL-SCNC: 98 MMOL/L (ref 98–107)
CO2 SERPL-SCNC: 23.3 MMOL/L (ref 22–29)
CREAT SERPL-MCNC: 4.69 MG/DL (ref 0.57–1)
DEPRECATED RDW RBC AUTO: 50.9 FL (ref 37–54)
EGFRCR SERPLBLD CKD-EPI 2021: 9.8 ML/MIN/1.73
EOSINOPHIL # BLD AUTO: 0.35 10*3/MM3 (ref 0–0.4)
EOSINOPHIL NFR BLD AUTO: 6.3 % (ref 0.3–6.2)
ERYTHROCYTE [DISTWIDTH] IN BLOOD BY AUTOMATED COUNT: 14.6 % (ref 12.3–15.4)
GLUCOSE BLDC GLUCOMTR-MCNC: 212 MG/DL (ref 70–130)
GLUCOSE BLDC GLUCOMTR-MCNC: 218 MG/DL (ref 70–130)
GLUCOSE BLDC GLUCOMTR-MCNC: 256 MG/DL (ref 70–130)
GLUCOSE BLDC GLUCOMTR-MCNC: 268 MG/DL (ref 70–130)
GLUCOSE SERPL-MCNC: 252 MG/DL (ref 65–99)
HCT VFR BLD AUTO: 26.4 % (ref 34–46.6)
HGB BLD-MCNC: 8.3 G/DL (ref 12–15.9)
IMM GRANULOCYTES # BLD AUTO: 0.06 10*3/MM3 (ref 0–0.05)
IMM GRANULOCYTES NFR BLD AUTO: 1.1 % (ref 0–0.5)
LYMPHOCYTES # BLD AUTO: 1.66 10*3/MM3 (ref 0.7–3.1)
LYMPHOCYTES NFR BLD AUTO: 30 % (ref 19.6–45.3)
MCH RBC QN AUTO: 30.7 PG (ref 26.6–33)
MCHC RBC AUTO-ENTMCNC: 31.4 G/DL (ref 31.5–35.7)
MCV RBC AUTO: 97.8 FL (ref 79–97)
MONOCYTES # BLD AUTO: 0.43 10*3/MM3 (ref 0.1–0.9)
MONOCYTES NFR BLD AUTO: 7.8 % (ref 5–12)
NEUTROPHILS NFR BLD AUTO: 3 10*3/MM3 (ref 1.7–7)
NEUTROPHILS NFR BLD AUTO: 54.1 % (ref 42.7–76)
NRBC BLD AUTO-RTO: 0 /100 WBC (ref 0–0.2)
PLATELET # BLD AUTO: 155 10*3/MM3 (ref 140–450)
PMV BLD AUTO: 10.4 FL (ref 6–12)
POTASSIUM SERPL-SCNC: 4.1 MMOL/L (ref 3.5–5.2)
RBC # BLD AUTO: 2.7 10*6/MM3 (ref 3.77–5.28)
SODIUM SERPL-SCNC: 133 MMOL/L (ref 136–145)
WBC NRBC COR # BLD: 5.54 10*3/MM3 (ref 3.4–10.8)

## 2023-08-26 PROCEDURE — 97116 GAIT TRAINING THERAPY: CPT

## 2023-08-26 PROCEDURE — 85025 COMPLETE CBC W/AUTO DIFF WBC: CPT | Performed by: FAMILY MEDICINE

## 2023-08-26 PROCEDURE — 80048 BASIC METABOLIC PNL TOTAL CA: CPT | Performed by: FAMILY MEDICINE

## 2023-08-26 PROCEDURE — 63710000001 INSULIN LISPRO (HUMAN) PER 5 UNITS: Performed by: INTERNAL MEDICINE

## 2023-08-26 PROCEDURE — 82948 REAGENT STRIP/BLOOD GLUCOSE: CPT

## 2023-08-26 PROCEDURE — 97110 THERAPEUTIC EXERCISES: CPT

## 2023-08-26 PROCEDURE — 97535 SELF CARE MNGMENT TRAINING: CPT

## 2023-08-26 PROCEDURE — 99231 SBSQ HOSP IP/OBS SF/LOW 25: CPT | Performed by: FAMILY MEDICINE

## 2023-08-26 PROCEDURE — 63710000001 INSULIN GLARGINE PER 5 UNITS: Performed by: FAMILY MEDICINE

## 2023-08-26 PROCEDURE — 97530 THERAPEUTIC ACTIVITIES: CPT

## 2023-08-26 PROCEDURE — 25010000002 HEPARIN (PORCINE) PER 1000 UNITS: Performed by: FAMILY MEDICINE

## 2023-08-26 RX ADMIN — Medication 1 TABLET: at 08:00

## 2023-08-26 RX ADMIN — INSULIN LISPRO 3 UNITS: 100 INJECTION, SOLUTION INTRAVENOUS; SUBCUTANEOUS at 20:45

## 2023-08-26 RX ADMIN — INSULIN LISPRO 3 UNITS: 100 INJECTION, SOLUTION INTRAVENOUS; SUBCUTANEOUS at 07:58

## 2023-08-26 RX ADMIN — HEPARIN SODIUM 5000 UNITS: 5000 INJECTION INTRAVENOUS; SUBCUTANEOUS at 08:01

## 2023-08-26 RX ADMIN — HEPARIN SODIUM 5000 UNITS: 5000 INJECTION INTRAVENOUS; SUBCUTANEOUS at 20:45

## 2023-08-26 RX ADMIN — HYDROCODONE BITARTRATE AND ACETAMINOPHEN 1 TABLET: 10; 325 TABLET ORAL at 14:37

## 2023-08-26 RX ADMIN — HYDROCODONE BITARTRATE AND ACETAMINOPHEN 1 TABLET: 10; 325 TABLET ORAL at 05:31

## 2023-08-26 RX ADMIN — ASPIRIN 81 MG: 81 TABLET, COATED ORAL at 08:01

## 2023-08-26 RX ADMIN — INSULIN LISPRO 4 UNITS: 100 INJECTION, SOLUTION INTRAVENOUS; SUBCUTANEOUS at 17:44

## 2023-08-26 RX ADMIN — ATORVASTATIN CALCIUM 80 MG: 40 TABLET, FILM COATED ORAL at 20:45

## 2023-08-26 RX ADMIN — ISOSORBIDE MONONITRATE 30 MG: 30 TABLET, EXTENDED RELEASE ORAL at 08:00

## 2023-08-26 RX ADMIN — LEVOTHYROXINE SODIUM 25 MCG: 25 TABLET ORAL at 08:00

## 2023-08-26 RX ADMIN — TRAZODONE HYDROCHLORIDE 50 MG: 50 TABLET ORAL at 20:45

## 2023-08-26 RX ADMIN — HYDROXYZINE HYDROCHLORIDE 12.5 MG: 25 TABLET, FILM COATED ORAL at 20:45

## 2023-08-26 RX ADMIN — ROPINIROLE HYDROCHLORIDE 0.5 MG: 0.25 TABLET, FILM COATED ORAL at 20:45

## 2023-08-26 RX ADMIN — INSULIN GLARGINE 12 UNITS: 100 INJECTION, SOLUTION SUBCUTANEOUS at 20:46

## 2023-08-26 RX ADMIN — ROPINIROLE HYDROCHLORIDE 0.5 MG: 0.25 TABLET, FILM COATED ORAL at 08:00

## 2023-08-26 RX ADMIN — AMLODIPINE BESYLATE 10 MG: 10 TABLET ORAL at 08:01

## 2023-08-26 RX ADMIN — PANTOPRAZOLE SODIUM 40 MG: 40 TABLET, DELAYED RELEASE ORAL at 08:00

## 2023-08-26 RX ADMIN — FLUOXETINE HYDROCHLORIDE 40 MG: 20 CAPSULE ORAL at 08:00

## 2023-08-26 RX ADMIN — LOPERAMIDE HYDROCHLORIDE 2 MG: 2 CAPSULE ORAL at 14:40

## 2023-08-26 RX ADMIN — INSULIN LISPRO 4 UNITS: 100 INJECTION, SOLUTION INTRAVENOUS; SUBCUTANEOUS at 12:13

## 2023-08-26 RX ADMIN — METOPROLOL SUCCINATE 25 MG: 25 TABLET, EXTENDED RELEASE ORAL at 08:01

## 2023-08-26 RX ADMIN — CASTOR OIL AND BALSAM, PERU 1 APPLICATION: 788; 87 OINTMENT TOPICAL at 08:03

## 2023-08-26 RX ADMIN — CASTOR OIL AND BALSAM, PERU 1 APPLICATION: 788; 87 OINTMENT TOPICAL at 20:46

## 2023-08-26 NOTE — PROGRESS NOTES
[facility]  PROGRESS NOTE     Patient Identification:  Name:  Reny Elena  Age:  63 y.o.  Sex:  female  :  1960  MRN:  [id number]  Visit Number:  [id number]  ROOM: 106/     Primary Care Provider:  Susan Stephens APRN    Length of stay in inpatient status:  3    Subjective     Chief Compliant:  No chief complaint on file.      History of Presenting Illness: Patient 63-year-old female being treated for debility.  Patient states she is doing reasonably well this morning.  Patient somewhat fatigued today.  Patient states she did have some difficulty voiding earlier this morning and states that she just has difficulty on the bedpan.    Objective     Current Hospital Meds:amLODIPine, 10 mg, Oral, Daily  aspirin, 81 mg, Oral, Daily  atorvastatin, 80 mg, Oral, Nightly  castor oil-balsam peru, 1 application , Topical, Q12H  FLUoxetine, 40 mg, Oral, Daily  heparin (porcine), 5,000 Units, Subcutaneous, Q12H  insulin glargine, 6 Units, Subcutaneous, Nightly  insulin lispro, 2-7 Units, Subcutaneous, 4x Daily AC & at Bedtime  isosorbide mononitrate, 30 mg, Oral, Daily  levothyroxine, 25 mcg, Oral, Daily  metoprolol succinate XL, 25 mg, Oral, Daily  multivitamin, 1 tablet, Oral, Daily  pantoprazole, 40 mg, Oral, Daily  rOPINIRole, 0.5 mg, Oral, BID  traZODone, 50 mg, Oral, Nightly       ----------------------------------------------------------------------------------------------------------------------  Vital Signs:  Temp:  [97.7 °F (36.5 °C)-98.7 °F (37.1 °C)] 97.7 °F (36.5 °C)  Heart Rate:  [68-73] 68  Resp:  [16-18] 16  BP: (111-147)/(55-78) 138/74  SpO2:  [92 %-96 %] 94 %  on   ;   Device (Oxygen Therapy): room air  Body mass index is 21.28 kg/m².    Wt Readings from Last 3 Encounters:   23 56.2 kg (124 lb)   23 56.7 kg (124 lb 14.4 oz)   23 42.2 kg (93 lb)     Intake & Output (last 3 days)          07 07 07  0701  08/27 0700    P.O. 120 1140 1560 240    Total Intake(mL/kg) 120 (2.1) 1140 (20.3) 1560 (27.8) 240 (4.3)    Urine (mL/kg/hr) 0 0 (0)      Stool  0      Dialysis 685 379  734    Total Output 685 379  734    Net -565 +761 +1560 -494            Urine Unmeasured Occurrence 2 x 3 x 3 x     Stool Unmeasured Occurrence  2 x            Diet: Regular/House Diet, Diabetic Diets; Consistent Carbohydrate; Texture: Regular Texture (IDDSI 7); Fluid Consistency: Thin (IDDSI 0)  ----------------------------------------------------------------------------------------------------------------------  Physical exam:  Constitutional: Chronically ill-appearing female in no distress  HEENT: Normocephalic atraumatic  Neck:   Supple  Cardiovascular: Regular rate and rhythm  Pulmonary/Chest: Clear to auscultation  Abdominal:  .  Positive bowel sounds soft  Musculoskeletal: No obvious arthropathy  Neurological: Generalized weakness  Skin: No rash  Peripheral vascular: No edema  Genitourinary:  ----------------------------------------------------------------------------------------------------------------------    Last echocardiogram:  Results for orders placed during the hospital encounter of 08/19/23    Adult Transthoracic Echo Complete W/ Cont if Necessary Per Protocol    Interpretation Summary    Left ventricular systolic function is normal. Left ventricular ejection fraction appears to be 56 - 60%.    Left ventricular diastolic function is consistent with (grade I) impaired relaxation.    Left atrial volume is mildly increased.    Moderate mitral valve stenosis is present.    Estimated right ventricular systolic pressure from tricuspid regurgitation is mildly elevated (35-45 mmHg).    Mild pulmonary hypertension is present.    There is no evidence of pericardial effusion.    ----------------------------------------------------------------------------------------------------------------------  Results from last 7 days   Lab Units  08/26/23  0020 08/24/23  0100 08/20/23  0124 08/19/23  1109   WBC 10*3/mm3 5.54 4.96 5.94 5.95   HEMOGLOBIN g/dL 8.3* 8.5* 8.0* 10.1*   HEMATOCRIT % 26.4* 27.3* 25.3* 31.3*   MCV fL 97.8* 95.5 94.4 92.6   MCHC g/dL 31.4* 31.1* 31.6 32.3   PLATELETS 10*3/mm3 155 141 120* 106*   INR   --   --   --  0.85*         Results from last 7 days   Lab Units 08/26/23  0020 08/24/23  0100 08/22/23 0139 08/21/23 0138 08/20/23 0124 08/19/23  1109   SODIUM mmol/L 133* 133* 134*   < > 138 138   POTASSIUM mmol/L 4.1 4.1 3.7   < > 3.6 4.0   MAGNESIUM mg/dL  --  2.0  --   --   --  2.2   CHLORIDE mmol/L 98 96* 97*   < > 104 102   CO2 mmol/L 23.3 24.5 24.2   < > 21.0* 22.2   BUN mg/dL 61* 45* 41*   < > 49* 55*   CREATININE mg/dL 4.69* 4.78* 4.97*   < > 4.89* 5.19*   CALCIUM mg/dL 7.4* 7.4* 6.8*   < > 7.1* 7.4*   PHOSPHORUS mg/dL  --   --   --   --   --  6.4*   GLUCOSE mg/dL 252* 151* 228*   < > 366* 264*   ALBUMIN g/dL  --  2.7*  --   --  2.6* 3.3*   BILIRUBIN mg/dL  --  <0.2  --   --  0.2 0.3   ALK PHOS U/L  --  106  --   --  63 82   AST (SGOT) U/L  --  22  --   --  11 16   ALT (SGPT) U/L  --  14  --   --  9 13    < > = values in this interval not displayed.   Estimated Creatinine Clearance: 10.6 mL/min (A) (by C-G formula based on SCr of 4.69 mg/dL (H)).  No results found for: AMMONIA  Results from last 7 days   Lab Units 08/19/23  1414 08/19/23  1109   HSTROP T ng/L 150* 158*             Glucose   Date/Time Value Ref Range Status   08/26/2023 0615 218 (H) 70 - 130 mg/dL Final   08/25/2023 2008 344 (H) 70 - 130 mg/dL Final   08/25/2023 1918 324 (H) 70 - 130 mg/dL Final   08/25/2023 1555 244 (H) 70 - 130 mg/dL Final   08/25/2023 1110 224 (H) 70 - 130 mg/dL Final   08/25/2023 0649 186 (H) 70 - 130 mg/dL Final   08/24/2023 1924 341 (H) 70 - 130 mg/dL Final   08/24/2023 1552 290 (H) 70 - 130 mg/dL Final     Lab Results   Component Value Date    .900 (H) 08/19/2023    FREET4 0.39 (L) 08/19/2023     No results found for:  PREGTESTUR, PREGSERUM, HCG, HCGQUANT  Pain Management Panel  More data exists         Latest Ref Rng & Units 3/5/2023 5/11/2022   Pain Management Panel   Amphetamine, Urine Qual Negative Negative  Negative    Barbiturates Screen, Urine Negative Negative  Negative    Benzodiazepine Screen, Urine Negative Negative  Positive    Buprenorphine, Screen, Urine Negative Negative  Negative    Cocaine Screen, Urine Negative Negative  Negative    Methadone Screen , Urine Negative Negative  Negative    Methamphetamine, Ur Negative Negative  Negative      Brief Urine Lab Results  (Last result in the past 365 days)        Color   Clarity   Blood   Leuk Est   Nitrite   Protein   CREAT   Urine HCG        08/19/23 1337 Yellow   Clear   Negative   Negative   Negative   100 mg/dL (2+)                 No results found for: BLOODCX  Results from last 7 days   Lab Units 08/19/23  1337   NITRITE UA  Negative   WBC UA /HPF 3-5*   BACTERIA UA /HPF None Seen   SQUAM EPITHEL UA /HPF 0-2     No results found for: URINECX  No results found for: WOUNDCX  No results found for: STOOLCX        I have personally looked at the labs and they are summarized above.  ----------------------------------------------------------------------------------------------------------------------  Detailed radiology reports for the last 24 hours:    Imaging Results (Last 24 Hours)       ** No results found for the last 24 hours. **          Final impressions for the last 30 days of radiology reports:    XR Chest AP    Result Date: 8/19/2023   No evidence of acute disease in the chest.  This report was finalized on 8/19/2023 2:53 PM by Steven Melton MD.     I have personally looked at the radiology images and read the final radiology report.    Assessment & Plan    Debility--for minimum assist for bed mobility; minimum assist for bed to chair and chair to bed transfers.  Minimum assistance to moderate assistance for sit to stand and stand to sit transfers.   Ambulated 60 feet x 2 with front wheel walker and minimum assist yesterday.  Worked with occupational therapy regarding motor skills yesterday to improve ADLs and functional mobility.    End-stage renal disease requiring peritoneal dialysis.  Patient doing well with this and appreciate nephrology management of this issue    Hypertension controlled.  Continue Norvasc and metoprolol    Diabetes mellitus suboptimal control.  Will adjust Levemir    Chronic neuropathic pain continue gabapentin/.  [place]    History of CVA continue high-dose statin therapy and aspirin therapy.    Hypothyroidism continue Synthroid    CHF EF compensated at this time continue beta-blocker and Imdur therapy.  Hydralazine was held during her recent acute care admission secondary to hypotension    GERD continue PPI    Chronic anemia stable    PAD continue aspirin therapy and high-dose statin    VTE Prophylaxis:   Mechanical Order History:       None          Pharmalogical Order History:        Ordered     Dose Route Frequency Stop    08/24/23 1130  heparin (porcine) 5000 UNIT/ML injection 5,000 Units         5,000 Units SC Every 12 Hours Scheduled --                        [facility] Braden [facility]  [facility]  08/26/23  10:03 EDT

## 2023-08-26 NOTE — THERAPY TREATMENT NOTE
Inpatient Rehabilitation - Occupational Therapy Treatment Note    EDWARD Veliz     Patient Name: Reny Elena  : 1960  MRN: [id number]    Today's Date: 2023                 Admit Date: 2023       No diagnosis found.    Patient Active Problem List   Diagnosis    Tibia/fibula fracture    Iron deficiency anemia    Type 2 diabetes mellitus with hyperglycemia, with long-term current use of insulin    Wound of right ankle    Cellulitis    Metabolic encephalopathy    History of non-ST elevation myocardial infarction (NSTEMI)    HTN (hypertension)    CVA (cerebral vascular accident)    Chronic diastolic CHF (congestive heart failure)    Decubitus ulcer of coccyx, unspecified pressure ulcer stage    Depression    Expressive aphasia    Impaired mobility and ADLs    Hyperkalemia    Subdural hematoma    Severe malnutrition    Cerebrovascular accident (CVA), unspecified mechanism    PRES (posterior reversible encephalopathy syndrome)    Debility       Past Medical History:   Diagnosis Date    Arthritis     Closed fracture of right fibula and tibia 2018    Depression     Diabetic ulcer of left foot associated with type 2 diabetes mellitus 2017    Diastolic dysfunction     Disease of thyroid gland     Elevated cholesterol     End stage renal disease     Essential hypertension     GERD (gastroesophageal reflux disease)     History of transfusion     Hyperlipidemia     Iron deficiency anemia 2018    PAD (peripheral artery disease)     Renal failure     Type 2 diabetes mellitus with hyperglycemia, with long-term current use of insulin 2018       Past Surgical History:   Procedure Laterality Date    ABDOMINAL SURGERY      BACK SURGERY      Post spinal diskectomy, osteophytectomy in one lumbar interspace    CATARACT EXTRACTION      Left      SECTION      DENTAL PROCEDURE      ENDOSCOPY      FRACTURE SURGERY      right leg    HYSTERECTOMY      INCISION AND DRAINAGE LEG Left 4/15/2017     Procedure: INCISION AND DRAINAGE LOWER EXTREMITY;  Surgeon: Basilio Morris MD;  Location:  [facility] OR;  Service:     INCISION AND DRAINAGE LEG Left 5/26/2017    Procedure: Irrigation and Debridment abcess diabetic wound left foot with deep culture;  Surgeon: Basilio Morris MD;  Location:  [facility] OR;  Service:     INSERTION PERITONEAL DIALYSIS CATHETER N/A 12/16/2021    Procedure: INSERTION PERITONEAL DIALYSIS CATHETER LAPAROSCOPIC;  Surgeon: Edy Glover MD;  Location: [facility] OR;  Service: General;  Laterality: N/A;    KNEE ARTHROSCOPY Right     ORIF TIBIA FRACTURE Right 12/28/2018    Procedure: TIBIAL  FRACTURE OPEN REDUCTION INTERNAL FIXATION;  Surgeon: Jung Barragan MD;  Location: [facility] OR;  Service: Orthopedics    TOE AMPUTATION Right     5th    TUBAL ABDOMINAL LIGATION               IRF OT ASSESSMENT FLOWSHEET (last 12 hours)       IRF OT Evaluation and Treatment       Row Name 08/26/23 1405          OT Time and Intention    Document Type daily treatment  -HB     Mode of Treatment individual therapy;occupational therapy  -HB     Total Minutes, Occupational Therapy 90  -HB     Patient Effort good  -HB     Symptoms Noted During/After Treatment none  -HB       Row Name 08/26/23 9931          General Information    Patient Profile Reviewed yes  -HB     Patient/Family/Caregiver Comments/Observations Patient and RN okd OT this date.  -HB     General Observations of Patient Patient tolerated OT well with no adverse reactions.  -HB     Existing Precautions/Restrictions fall  -HB       Row Name 08/26/23 2919          Pain Assessment    Pretreatment Pain Rating 0/10 - no pain  -HB     Posttreatment Pain Rating 0/10 - no pain  -HB       Row Name 08/26/23 1407          Cognition/Psychosocial    Affect/Mental Status (Cognition) WFL  -HB     Orientation Status (Cognition) oriented x 3  -HB     Follows Commands (Cognition) verbal cues/prompting required;physical/tactile prompts required  -HB       Row Name 08/26/23 4281           Grooming    [place] Level (Grooming) grooming skills;set up;supervision  -     Position (Grooming) sink side  -       Row Name 08/26/23 1405          Toileting    [place] Level (Toileting) toileting skills;moderate assist (50% patient effort)  -     Assistive Device Use (Toileting) raised toilet seat;grab bar/safety frame  -     Position (Toileting) supported sitting;supported standing  -       Row Name 08/26/23 1405          Bed Mobility    Bed Mobility supine-sit;sit-supine  -     Supine-Sit [place] (Bed Mobility) minimum assist (75% patient effort);nonverbal cues (demo/gesture);verbal cues  -     Sit-Supine [place] (Bed Mobility) moderate assist (50% patient effort);nonverbal cues (demo/gesture);verbal cues  -       Row Name 08/26/23 1405          Bed-Chair Transfer    Bed-Chair [place] (Transfers) minimum assist (75% patient effort);verbal cues;nonverbal cues (demo/gesture)  -     Assistive Device (Bed-Chair Transfers) wheelchair  -       Row Name 08/26/23 1405          Chair-Bed Transfer    Chair-Bed [place] (Transfers) minimum assist (75% patient effort);verbal cues;nonverbal cues (demo/gesture);moderate assist (50% patient effort)  -     Assistive Device (Chair-Bed Transfers) wheelchair  -       Row Name 08/26/23 1405          Sit-Stand Transfer    Sit-Stand [place] (Transfers) minimum assist (75% patient effort);verbal cues;nonverbal cues (demo/gesture);moderate assist (50% patient effort)  -       Row Name 08/26/23 1405          Stand-Sit Transfer    Stand-Sit [place] (Transfers) minimum assist (75% patient effort);verbal cues;nonverbal cues (demo/gesture);moderate assist (50% patient effort)  -       Row Name 08/26/23 1405          Toilet Transfer    Type (Toilet Transfer) stand pivot/stand step  -     [place] Level (Toilet Transfer) minimum assist (75% patient effort);verbal cues;nonverbal cues (demo/gesture)  -        Row Name 08/26/23 1405          Motor Skills    Motor Skills coordination;functional endurance;motor control/coordination interventions  -HB     Motor Control/Coordination Interventions fine motor manipulation/dexterity activities;therapeutic exercise/ROM;gross motor coordination activities  -HB     Therapeutic Exercise shoulder;elbow/forearm;wrist;hand  -HB     Additional Documentation --  BUE TA to increase ADL status/endurance; rest between tasks  -HB       Row Name 08/26/23 1405          Positioning and Restraints    Pre-Treatment Position in bed  -HB     Post Treatment Position bed  -HB     In Bed encouraged to call for assist;call light within reach  -HB       Row Name 08/26/23 1405          Toileting Goal 1 (OT-IRF)    Activity/Device (Toileting Goal 1, OT-IRF) toileting skills, all  -HB     [place] Level (Toileting Goal 1, OT-IRF) minimum assist (75% or more patient effort)  -HB     Progress/Outcomes (Toileting Goal 1, OT-IRF) continuing progress toward goal  -HB     Time Frame (Toileting Goal 1, OT-IRF) short-term goal (STG);1 week  -HB       Row Name 08/26/23 1405          Toileting Goal 2 (OT-IRF)    Activity/Device (Toileting Goal 2, OT-IRF) toileting skills, all  -HB     [place] Level (Toileting Goal 2, OT-IRF) contact guard required  -HB     Progress/Outcomes (Toileting Goal 2, OT-IRF) continuing progress toward goal  -HB     Time Frame (Toileting Goal 2, OT-IRF) long-term goal (LTG);by discharge  -               User Key  (r) = Recorded By, (t) = Taken By, (c) = Cosigned By      Initials Name Effective Dates    HB Ella Wallace, OT 05/25/21 -                      Occupational Therapy Education       Title: PT OT SLP Therapies (Done)       Topic: Occupational Therapy (Done)       Point: ADL training (Done)       Description:   Instruct learner(s) on proper safety adaptation and remediation techniques during self care or transfers.   Instruct in proper use of assistive devices.                   Learning Progress Summary             Patient Acceptance, E, VU,NR by  at 8/26/2023 1417    Acceptance, E, VU,NR by  at 8/25/2023 1438    Acceptance, E, VU,NR by  at 8/24/2023 1450                         Point: Precautions (Done)       Description:   Instruct learner(s) on prescribed precautions during self-care and functional transfers.                  Learning Progress Summary             Patient Acceptance, E, VU,NR by  at 8/26/2023 1417    Acceptance, E, VU,NR by  at 8/25/2023 1438    Acceptance, E, VU,NR by BF at 8/24/2023 1450                                         User Key       Initials Effective Dates Name Provider Type Discipline     07/11/23 -  Yael Mendez OT Occupational Therapist OT     05/25/21 -  Ella Wallace OT Occupational Therapist OT                        OT Recommendation and Plan                         Time Calculation:      Time Calculation- OT       Row Name 08/26/23 1417             Time Calculation- OT    OT Start Time 1230  -HB      OT Stop Time 1400  -      OT Time Calculation (min) 90 min  -      Total Timed Code Minutes- OT 90 minute(s)  -                User Key  (r) = Recorded By, (t) = Taken By, (c) = Cosigned By      Initials Name Provider Type     Ella Wallace OT Occupational Therapist                  Therapy Charges for Today       Code Description Service Date Service Provider Modifiers Qty    90507578876 HC OT SELF CARE/MGMT/TRAIN EA 15 MIN 8/26/2023 Ella Wallace OT GO 2    51521298596 HC OT THER PROC EA 15 MIN 8/26/2023 Ella Wallace OT GO 2    74222020271 HC OT THERAPEUTIC ACT EA 15 MIN 8/26/2023 Ella Wallace OT GO 2                     Ella Wallace OT  8/26/2023

## 2023-08-26 NOTE — THERAPY TREATMENT NOTE
Inpatient Rehabilitation - Physical Therapy Treatment Note       EDWARD Veliz     Patient Name: Reny Elena  : 1960  MRN: [id number]    Today's Date: 2023                    Admit Date: 2023      Visit Dx:   No diagnosis found.    Patient Active Problem List   Diagnosis    Tibia/fibula fracture    Iron deficiency anemia    Type 2 diabetes mellitus with hyperglycemia, with long-term current use of insulin    Wound of right ankle    Cellulitis    Metabolic encephalopathy    History of non-ST elevation myocardial infarction (NSTEMI)    HTN (hypertension)    CVA (cerebral vascular accident)    Chronic diastolic CHF (congestive heart failure)    Decubitus ulcer of coccyx, unspecified pressure ulcer stage    Depression    Expressive aphasia    Impaired mobility and ADLs    Hyperkalemia    Subdural hematoma    Severe malnutrition    Cerebrovascular accident (CVA), unspecified mechanism    PRES (posterior reversible encephalopathy syndrome)    Debility       Past Medical History:   Diagnosis Date    Arthritis     Closed fracture of right fibula and tibia 2018    Depression     Diabetic ulcer of left foot associated with type 2 diabetes mellitus 2017    Diastolic dysfunction     Disease of thyroid gland     Elevated cholesterol     End stage renal disease     Essential hypertension     GERD (gastroesophageal reflux disease)     History of transfusion     Hyperlipidemia     Iron deficiency anemia 2018    PAD (peripheral artery disease)     Renal failure     Type 2 diabetes mellitus with hyperglycemia, with long-term current use of insulin 2018       Past Surgical History:   Procedure Laterality Date    ABDOMINAL SURGERY      BACK SURGERY      Post spinal diskectomy, osteophytectomy in one lumbar interspace    CATARACT EXTRACTION      Left      SECTION      DENTAL PROCEDURE      ENDOSCOPY      FRACTURE SURGERY      right leg    HYSTERECTOMY      INCISION AND DRAINAGE LEG Left  4/15/2017    Procedure: INCISION AND DRAINAGE LOWER EXTREMITY;  Surgeon: Basilio Morris MD;  Location: [facility] OR;  Service:     INCISION AND DRAINAGE LEG Left 5/26/2017    Procedure: Irrigation and Debridment abcess diabetic wound left foot with deep culture;  Surgeon: Basilio Morris MD;  Location: [facility] OR;  Service:     INSERTION PERITONEAL DIALYSIS CATHETER N/A 12/16/2021    Procedure: INSERTION PERITONEAL DIALYSIS CATHETER LAPAROSCOPIC;  Surgeon: Edy Glover MD;  Location: [facility] OR;  Service: General;  Laterality: N/A;    KNEE ARTHROSCOPY Right     ORIF TIBIA FRACTURE Right 12/28/2018    Procedure: TIBIAL  FRACTURE OPEN REDUCTION INTERNAL FIXATION;  Surgeon: Jung Barragan MD;  Location: [facility] OR;  Service: Orthopedics    TOE AMPUTATION Right     5th    TUBAL ABDOMINAL LIGATION         PT ASSESSMENT (last 12 hours)       IRF PT Evaluation and Treatment       Row Name 08/26/23 1518          PT Time and Intention    Document Type daily treatment  -RG     Mode of Treatment individual therapy;physical therapy  -RG     Patient/Family/Caregiver Comments/Observations Pt and nursing in agreement for skilled PT on this date.  -RG       Row Name 08/26/23 1518          General Information    Existing Precautions/Restrictions fall  peritoneal dialysis port at ABD, L patella subluxation  -RG       Row Name 08/26/23 1518          Pain Scale: FACES Pre/Post-Treatment    Pain: FACES Scale, Pretreatment 4-->hurts little more  -RG     Posttreatment Pain Rating 4-->hurts little more  -RG       Row Name 08/26/23 1518          Cognition/Psychosocial    Affect/Mental Status (Cognition) WFL  -RG     Orientation Status (Cognition) oriented x 3  -RG     Follows Commands (Cognition) verbal cues/prompting required;physical/tactile prompts required  -RG     Personal Safety Interventions fall prevention program maintained;gait belt;nonskid shoes/slippers when out of bed  -RG     Cognitive Function WFL  -RG       Row Name 08/26/23  1518          Bed Mobility    Supine-Sit-Supine [place] (Bed Mobility) minimum assist (75% patient effort);verbal cues;nonverbal cues (demo/gesture)  -RG       Row Name 08/26/23 1518          Chair-Bed Transfer    Chair-Bed [place] (Transfers) minimum assist (75% patient effort);verbal cues;nonverbal cues (demo/gesture);moderate assist (50% patient effort)  -RG     Assistive Device (Chair-Bed Transfers) wheelchair  -RG       Row Name 08/26/23 1518          Sit-Stand Transfer    Sit-Stand [place] (Transfers) minimum assist (75% patient effort);verbal cues;nonverbal cues (demo/gesture);moderate assist (50% patient effort)  -RG     Assistive Device (Sit-Stand Transfers) wheelchair;walker, front-wheeled  -RG       Row Name 08/26/23 1518          Stand-Sit Transfer    Stand-Sit [place] (Transfers) minimum assist (75% patient effort);verbal cues;nonverbal cues (demo/gesture);moderate assist (50% patient effort)  -RG     Assistive Device (Stand-Sit Transfers) wheelchair;walker, front-wheeled  -RG       Row Name 08/26/23 1518          Gait/Stairs (Locomotion)    [place] Level (Gait) minimum assist (75% patient effort);verbal cues;nonverbal cues (demo/gesture)  -RG     Assistive Device (Gait) walker, front-wheeled  -RG     Distance in Feet (Gait) 20 x 2  -RG     Pattern (Gait) step-through  -RG     Deviations/Abnormal Patterns (Gait) casa decreased;gait speed decreased;stride length decreased  -RG     Bilateral Gait Deviations forward flexed posture;heel strike decreased  -RG     Comment, (Gait/Stairs) c/o fatigue  -RG       Row Name 08/26/23 1518          Safety Issues, Functional Mobility    Impairments Affecting Function (Mobility) balance;endurance/activity tolerance;pain;range of motion (ROM);strength  -RG       Row Name 08/26/23 1518          Hip (Therapeutic Exercise)    Hip Strengthening (Therapeutic Exercise) bilateral;flexion;aBduction;aDduction;marching while seated;marching while  standing;sitting;standing;2 lb free weight;resistance band;green;10 repetitions;2 sets  -RG       Row Name 08/26/23 1518          Knee (Therapeutic Exercise)    Knee Strengthening (Therapeutic Exercise) bilateral;flexion;extension;marching while seated;marching while standing;LAQ (long arc quad);sitting;standing;2 lb free weight;resistance band;green;10 repetitions;2 sets  -RG       Row Name 08/26/23 1518          Ankle (Therapeutic Exercise)    Ankle Strengthening (Therapeutic Exercise) bilateral;plantarflexion;sitting;20 repititions  -       Row Name 08/26/23 1518          Positioning and Restraints    Pre-Treatment Position in bed  -RG     Post Treatment Position bed  -RG     In Bed notified nsg;supine;call light within reach;encouraged to call for assist;legs elevated  -       Row Name 08/26/23 1518          Therapy Assessment/Plan (PT)    Patient's Goals For Discharge return home  -       Row Name 08/26/23 1518          Therapy Assessment/Plan (PT)    Rehab Potential/Prognosis (PT) adequate, monitor progress closely  -RG     Frequency of Treatment (PT) 5 times per week  -RG     Estimated Duration of Therapy (PT) 2 weeks  -RG     Problem List (PT) balance;mobility;range of motion (ROM);strength;pain  -RG     Activity Limitations Related to Problem List (PT) unable to ambulate safely;unable to transfer safely  -       Row Name 08/26/23 1518          Therapy Plan Review/Discharge Plan (PT)    Anticipated Equipment Needs at Discharge (PT Eval) --  tbd  -RG     Anticipated Discharge Disposition (PT) home with assist;home with home health;home with outpatient therapy services  -       Row Name 08/26/23 1518          IRF PT Goals    Bed Mobility Goal Selection (PT-IRF) bed mobility, PT goal 1  -RG     Transfer Goal Selection (PT-IRF) transfers, PT goal 1  -RG     Gait (Walking Locomotion) Goal Selection (PT-IRF) gait, PT goal 1  -RG     Stairs Goal Selection (PT-IRF) stairs, PT goal 1  -       Row Name  08/26/23 1518          Bed Mobility Goal 1 (PT-IRF)    Activity/Assistive Device (Bed Mobility Goal 1, PT-IRF) sit to supine/supine to sit  -RG     [place] Level (Bed Mobility Goal 1, PT-IRF) independent  -RG     Time Frame (Bed Mobility Goal 1, PT-IRF) by discharge  -RG       Row Name 08/26/23 1518          Transfer Goal 1 (PT-IRF)    Activity/Assistive Device (Transfer Goal 1, PT-IRF) sit-to-stand/stand-to-sit;bed-to-chair/chair-to-bed  -RG     [place] Level (Transfer Goal 1, PT-IRF) modified independence  -RG     Time Frame (Transfer Goal 1, PT-IRF) by discharge  -RG       Row Name 08/26/23 1518          Gait/Walking Locomotion Goal 1 (PT-IRF)    Activity/Assistive Device (Gait/Walking Locomotion Goal 1, PT-IRF) walker, rolling  -RG     Gait/Walking Locomotion Distance Goal 1 (PT-IRF) 300'  -RG     [place] Level (Gait/Walking Locomotion Goal 1, PT-IRF) supervision required  -RG     Time Frame (Gait/Walking Locomotion Goal 1, PT-IRF) by discharge  -RG       Row Name 08/26/23 1518          Stairs Goal 1 (PT-IRF)    Activity/Assistive Device (Stairs Goal 1, PT-IRF) ascending stairs;descending stairs;using handrail, left;using handrail, right  -RG     Number of Stairs (Stairs Goal 1, PT-IRF) 5  -RG     [place] Level (Stairs Goal 1, PT-IRF) supervision required  -RG     Time Frame (Stairs Goal 1, PT-IRF) by discharge  -RG               User Key  (r) = Recorded By, (t) = Taken By, (c) = Cosigned By      Initials Name Provider Type    RG Rajiv Pagan, SHITAL Physical Therapist Assistant                  Wound 08/23/23 1923 Right posterior heel (Active)   Dressing Appearance open to air 08/25/23 1920   Closure None 08/26/23 0801   Base non-blanchable;maroon/purple 08/26/23 0801   Drainage Amount none 08/26/23 0801   Care, Wound cleansed with;sterile normal saline 08/26/23 0801   Dressing Care open to air 08/26/23 0801       Wound 08/23/23 2313 Left gluteal (Active)   Dressing Appearance open to air  08/25/23 1920   Closure None 08/26/23 0801   Base non-blanchable 08/26/23 0801   Periwound blistered 08/26/23 0801   Drainage Amount none 08/26/23 0801   Care, Wound cleansed with;soap and water;sterile normal saline 08/26/23 0801   Dressing Care open to air 08/26/23 0801     Physical Therapy Education       Title: PT OT SLP Therapies (Done)       Topic: Physical Therapy (Done)       Point: Mobility training (Done)       Learning Progress Summary             Patient Acceptance, E,D, VU,NR by RG at 8/26/2023 1522    Acceptance, E,D, VU,NR by  at 8/25/2023 1525    Acceptance, E,D, VU,NR by RG at 8/25/2023 1433    Acceptance, E, VU,NR by LB at 8/24/2023 1149                         Point: Home exercise program (Done)       Learning Progress Summary             Patient Acceptance, E,D, VU,NR by RG at 8/26/2023 1522    Acceptance, E,D, VU,NR by LL at 8/25/2023 1525    Acceptance, E,D, VU,NR by RG at 8/25/2023 1433    Acceptance, E, VU,NR by LB at 8/24/2023 1149                         Point: Body mechanics (Done)       Learning Progress Summary             Patient Acceptance, E,D, VU,NR by RG at 8/26/2023 1522    Acceptance, E,D, VU,NR by  at 8/25/2023 1525    Acceptance, E,D, VU,NR by RG at 8/25/2023 1433    Acceptance, E, VU,NR by LB at 8/24/2023 1149                         Point: Precautions (Done)       Learning Progress Summary             Patient Acceptance, E,D, VU,NR by RG at 8/26/2023 1522    Acceptance, E,D, VU,NR by  at 8/25/2023 1525    Acceptance, E,D, VU,NR by RG at 8/25/2023 1433    Acceptance, E, VU,NR by LB at 8/24/2023 1149                                         User Key       Initials Effective Dates Name Provider Type Discipline     06/16/21 -  Christel Ferguson, PT Physical Therapist PT     05/02/16 -  Lundberg, Carly, PTA Physical Therapist Assistant PT    RG 06/16/21 -  Rajiv Pagan, SHITAL Physical Therapist Assistant PT                    PT Recommendation and Plan    Frequency of  Treatment (PT): 5 times per week  Anticipated Equipment Needs at Discharge (PT Eval):  (tbd)                  Time Calculation:      PT Charges       Row Name 08/26/23 1522             Time Calculation    Start Time 0915  -RG      Stop Time 1045  -RG      Time Calculation (min) 90 min  -RG      PT Received On 08/26/23  -RG         Time Calculation- PT    Total Timed Code Minutes- PT 90 minute(s)  -RG                User Key  (r) = Recorded By, (t) = Taken By, (c) = Cosigned By      Initials Name Provider Type    Rajiv Garner PTA Physical Therapist Assistant                    Therapy Charges for Today       Code Description Service Date Service Provider Modifiers Qty    82698054208 HC PT THERAPEUTIC ACT EA 15 MIN 8/25/2023 Rajiv Pagan, [facility]ITAL GP, CQ 1    64109037396 HC PT THER PROC EA 15 MIN 8/25/2023 Rajiv Pagan, [facility]ITAL GP, CQ 2    43486786526 HC GAIT TRAINING EA 15 MIN 8/26/2023 Rajiv Pagan PTA GP, CQ 1    53870935051 HC PT THERAPEUTIC ACT EA 15 MIN 8/26/2023 Rajiv Pagan PTA GP, CQ 2    17952097069 HC PT THER PROC EA 15 MIN 8/26/2023 Rajiv Pagan PTA GP, CQ 3              PT G-Codes  AM-PAC 6 Clicks Score (PT): 17      Rajiv Pagan PTA  8/26/2023

## 2023-08-26 NOTE — PROGRESS NOTES
Rehabilitation Nursing  Inpatient Rehabilitation Plan of Care Note    Plan of Care  Safety    Potential for Injury (Active)  Current Status (8/23/2023 7:00:00 PM): NO FALLS  Weekly Goal: NO FALLS  Discharge Goal: NO FALLS    Body Systems    Integumentary (Active)  Current Status (8/23/2023 7:00:00 PM): NO FURTHER BREAKDOWN  Weekly Goal: NO FURTHER BREAKDOWN  Discharge Goal: NO FURTHER BREAKDOWNC    Signed by: Peg Meneses Nurse

## 2023-08-26 NOTE — PROGRESS NOTES
Nephrology Progress Note      Subjective     08/25/2023 doing well, no chest pain or shortness of breath lying on bed comfortably    08/26/2023 patient tolerated peritoneal dialysis well is getting rehab denies any shortness of breath    Objective       Vital signs :     Temp:  [97.7 °F (36.5 °C)-98.7 °F (37.1 °C)] 97.7 °F (36.5 °C)  Heart Rate:  [68-73] 68  Resp:  [16-18] 16  BP: (111-147)/(55-78) 138/74    Intake/Output                               08/24/23 0701 - 08/25/23 0700 08/25/23 0701 - 08/26/23 0700 08/26/23 0701 - 08/27/23 0700     6982-4616 7364-0575 Total 6430-1697 3547-4077 Total 0846-5568 5616-9499 Total                    Intake    P.O.  720  420 1140  1440  120 1560  --  -- --    Total Intake  6720 269 3576 -- -- --       Output    Dialysis  --  379 379  --  -- --  734  -- 734    Total Output -- 379 379 -- -- -- 734 -- 734             Physical Exam:  08/26/2023 reviewed as below  General Appearance : alert, pleasant, appears stated age, cooperative and alert  Lungs : clear to auscultation, respirations regular  Heart :  regular rhythm & normal rate, normal S1, S2 and no murmur, no rub  Abdomen : soft, non distended  Extremities : Trace edema   Neurologic :   orientated to person, place, time and situation, Grossly no focal deficits        Laboratory Data :     Albumin Albumin   Date Value Ref Range Status   08/24/2023 2.7 (L) 3.5 - 5.2 g/dL Final        Magnesium Magnesium   Date Value Ref Range Status   08/24/2023 2.0 1.6 - 2.4 mg/dL Final            PTH               No results found for: PTH    CBC and coagulation:  Results from last 7 days   Lab Units 08/26/23  0020 08/24/23  0100 08/20/23  0124 08/19/23  1109   WBC 10*3/mm3 5.54 4.96 5.94 5.95   HEMOGLOBIN g/dL 8.3* 8.5* 8.0* 10.1*   HEMATOCRIT % 26.4* 27.3* 25.3* 31.3*   MCV fL 97.8* 95.5 94.4 92.6   MCHC g/dL 31.4* 31.1* 31.6 32.3   PLATELETS 10*3/mm3 155 141 120* 106*   INR   --   --   --  0.85*     Acid/base balance:      Renal  and electrolytes:    Results from last 7 days   Lab Units 08/26/23  0020 08/24/23  0100 08/22/23  0139 08/21/23  0138 08/20/23  0124 08/19/23  1109   SODIUM mmol/L 133* 133* 134* 137 138 138   POTASSIUM mmol/L 4.1 4.1 3.7 3.2* 3.6 4.0   MAGNESIUM mg/dL  --  2.0  --   --   --  2.2   CHLORIDE mmol/L 98 96* 97* 100 104 102   CO2 mmol/L 23.3 24.5 24.2 23.8 21.0* 22.2   BUN mg/dL 61* 45* 41* 43* 49* 55*   CREATININE mg/dL 4.69* 4.78* 4.97* 4.89* 4.89* 5.19*   CALCIUM mg/dL 7.4* 7.4* 6.8* 7.2* 7.1* 7.4*   PHOSPHORUS mg/dL  --   --   --   --   --  6.4*     Estimated Creatinine Clearance: 10.6 mL/min (A) (by C-G formula based on SCr of 4.69 mg/dL (H)).  @GFRCG:3@   Liver and pancreatic function:  Results from last 7 days   Lab Units 08/24/23  0100 08/20/23  0124 08/19/23  1109   ALBUMIN g/dL 2.7* 2.6* 3.3*   BILIRUBIN mg/dL <0.2 0.2 0.3   ALK PHOS U/L 106 63 82   AST (SGOT) U/L 22 11 16   ALT (SGPT) U/L 14 9 13         Cardiac:      Liver and pancreatic function:  Results from last 7 days   Lab Units 08/24/23  0100 08/20/23  0124 08/19/23  1109   ALBUMIN g/dL 2.7* 2.6* 3.3*   BILIRUBIN mg/dL <0.2 0.2 0.3   ALK PHOS U/L 106 63 82   AST (SGOT) U/L 22 11 16   ALT (SGPT) U/L 14 9 13       Medications :     amLODIPine, 10 mg, Oral, Daily  aspirin, 81 mg, Oral, Daily  atorvastatin, 80 mg, Oral, Nightly  castor oil-balsam peru, 1 application , Topical, Q12H  FLUoxetine, 40 mg, Oral, Daily  heparin (porcine), 5,000 Units, Subcutaneous, Q12H  insulin glargine, 6 Units, Subcutaneous, Nightly  insulin lispro, 2-7 Units, Subcutaneous, 4x Daily AC & at Bedtime  isosorbide mononitrate, 30 mg, Oral, Daily  levothyroxine, 25 mcg, Oral, Daily  metoprolol succinate XL, 25 mg, Oral, Daily  multivitamin, 1 tablet, Oral, Daily  pantoprazole, 40 mg, Oral, Daily  rOPINIRole, 0.5 mg, Oral, BID  traZODone, 50 mg, Oral, Nightly             Assessment & Plan     1.  End-stage renal disease on peritoneal dialysis  2.  Type 2 diabetes with  nephropathy  3.  Chronic diarrhea now worse  4.  New onset hypothyroidism  5.  Frailty  6.  Hypertension  7.  Malnutrition  8.  Diabetic neuropathy and myopathy with inability to ambulate independently  9.  Anemia of CKD    08/25/2023 grossly stable from renal standpoint continue on nightly PD with 1.5% dialysate  Discussed in detail with Gisela PD nurse at [facility], there is no family member yet available to be trained for assistance in peritoneal dialysis.  Patient is going to tell us back by Monday to anticipate discharge planning, if patient needs to be transitioned to hemodialysis before discharge    08/26/2023 patient tolerated peritoneal dialysis well yesterday we will continue 1.5% dialysate solution finalize plan of discharge by Monday    Plan of care was discussed with the patient, understood verbalized agreed        S Crispin Burks MD  08/26/23  09:53 EDT

## 2023-08-26 NOTE — PLAN OF CARE
Problem: Rehabilitation (IRF) Plan of Care  Goal: Plan of Care Review  Outcome: Ongoing, Progressing  Flowsheets  Taken 8/26/2023 1124  Progress: improving  Plan of Care Reviewed With: patient  Taken 8/25/2023 1126  Outcome Evaluation:   Bed and chair alarm noted   Call light and items within reach   progressing with current therapies. No acute signs of distress noted. Continue current plan of care.  Goal: Patient-Specific Goal (Individualized)  Outcome: Ongoing, Progressing  Goal: Absence of New-Onset Illness or Injury  Outcome: Ongoing, Progressing  Intervention: Prevent Fall and Fall Injury  Recent Flowsheet Documentation  Taken 8/26/2023 0801 by Vicky Phillips, RN  Safety Promotion/Fall Prevention:   safety round/check completed   assistive device/personal items within reach   nonskid shoes/slippers when out of bed  Intervention: Prevent Infection  Recent Flowsheet Documentation  Taken 8/26/2023 0801 by Vicky Phillips, RN  Infection Prevention: hand hygiene promoted  Goal: Optimal Comfort and Wellbeing  Outcome: Ongoing, Progressing  Goal: Home and Community Transition Plan Established  Outcome: Ongoing, Progressing   Goal Outcome Evaluation:  Plan of Care Reviewed With: patient        Progress: improving  Outcome Evaluation: Bed and chair alarm noted; Call light and items within reach; progressing with current therapies. No acute signs of distress noted. Continue current plan of care.

## 2023-08-27 LAB
GLUCOSE BLDC GLUCOMTR-MCNC: 105 MG/DL (ref 70–130)
GLUCOSE BLDC GLUCOMTR-MCNC: 252 MG/DL (ref 70–130)
GLUCOSE BLDC GLUCOMTR-MCNC: 271 MG/DL (ref 70–130)
GLUCOSE BLDC GLUCOMTR-MCNC: 272 MG/DL (ref 70–130)

## 2023-08-27 PROCEDURE — 63710000001 INSULIN LISPRO (HUMAN) PER 5 UNITS: Performed by: INTERNAL MEDICINE

## 2023-08-27 PROCEDURE — 25010000002 HEPARIN (PORCINE) PER 1000 UNITS: Performed by: FAMILY MEDICINE

## 2023-08-27 PROCEDURE — 63710000001 INSULIN GLARGINE PER 5 UNITS: Performed by: FAMILY MEDICINE

## 2023-08-27 PROCEDURE — 82948 REAGENT STRIP/BLOOD GLUCOSE: CPT

## 2023-08-27 RX ADMIN — HYDROCODONE BITARTRATE AND ACETAMINOPHEN 1 TABLET: 10; 325 TABLET ORAL at 23:23

## 2023-08-27 RX ADMIN — Medication 1 TABLET: at 08:14

## 2023-08-27 RX ADMIN — TIZANIDINE 4 MG: 4 TABLET ORAL at 23:10

## 2023-08-27 RX ADMIN — FLUOXETINE HYDROCHLORIDE 40 MG: 20 CAPSULE ORAL at 08:15

## 2023-08-27 RX ADMIN — HEPARIN SODIUM 5000 UNITS: 5000 INJECTION INTRAVENOUS; SUBCUTANEOUS at 23:09

## 2023-08-27 RX ADMIN — HYDROXYZINE HYDROCHLORIDE 12.5 MG: 25 TABLET, FILM COATED ORAL at 23:10

## 2023-08-27 RX ADMIN — INSULIN LISPRO 4 UNITS: 100 INJECTION, SOLUTION INTRAVENOUS; SUBCUTANEOUS at 16:48

## 2023-08-27 RX ADMIN — CASTOR OIL AND BALSAM, PERU 1 APPLICATION: 788; 87 OINTMENT TOPICAL at 08:15

## 2023-08-27 RX ADMIN — CASTOR OIL AND BALSAM, PERU 1 APPLICATION: 788; 87 OINTMENT TOPICAL at 23:16

## 2023-08-27 RX ADMIN — INSULIN LISPRO 4 UNITS: 100 INJECTION, SOLUTION INTRAVENOUS; SUBCUTANEOUS at 12:13

## 2023-08-27 RX ADMIN — HEPARIN SODIUM 5000 UNITS: 5000 INJECTION INTRAVENOUS; SUBCUTANEOUS at 08:10

## 2023-08-27 RX ADMIN — PANTOPRAZOLE SODIUM 40 MG: 40 TABLET, DELAYED RELEASE ORAL at 08:14

## 2023-08-27 RX ADMIN — INSULIN GLARGINE 12 UNITS: 100 INJECTION, SOLUTION SUBCUTANEOUS at 23:22

## 2023-08-27 RX ADMIN — ATORVASTATIN CALCIUM 80 MG: 40 TABLET, FILM COATED ORAL at 23:09

## 2023-08-27 RX ADMIN — INSULIN LISPRO 4 UNITS: 100 INJECTION, SOLUTION INTRAVENOUS; SUBCUTANEOUS at 23:22

## 2023-08-27 RX ADMIN — ROPINIROLE HYDROCHLORIDE 0.5 MG: 0.25 TABLET, FILM COATED ORAL at 08:14

## 2023-08-27 RX ADMIN — METOPROLOL SUCCINATE 25 MG: 25 TABLET, EXTENDED RELEASE ORAL at 08:14

## 2023-08-27 RX ADMIN — ISOSORBIDE MONONITRATE 30 MG: 30 TABLET, EXTENDED RELEASE ORAL at 08:15

## 2023-08-27 RX ADMIN — TRAZODONE HYDROCHLORIDE 50 MG: 50 TABLET ORAL at 23:09

## 2023-08-27 RX ADMIN — AMLODIPINE BESYLATE 10 MG: 10 TABLET ORAL at 08:15

## 2023-08-27 RX ADMIN — HYDROCODONE BITARTRATE AND ACETAMINOPHEN 1 TABLET: 10; 325 TABLET ORAL at 08:09

## 2023-08-27 RX ADMIN — LEVOTHYROXINE SODIUM 25 MCG: 25 TABLET ORAL at 08:15

## 2023-08-27 RX ADMIN — ASPIRIN 81 MG: 81 TABLET, COATED ORAL at 08:15

## 2023-08-27 RX ADMIN — ROPINIROLE HYDROCHLORIDE 0.5 MG: 0.25 TABLET, FILM COATED ORAL at 23:09

## 2023-08-27 NOTE — PROGRESS NOTES
Nephrology Progress Note      Subjective     08/25/2023 doing well, no chest pain or shortness of breath lying on bed comfortably    08/26/2023   patient tolerated peritoneal dialysis well is getting rehab denies any shortness of breath    8/27/2023  Patient tolerated PD well denies any shortness of breath no urgency no frequency    Objective       Vital signs :     Temp:  [97.6 °F (36.4 °C)-98.8 °F (37.1 °C)] 98.3 °F (36.8 °C)  Heart Rate:  [66-68] 68  Resp:  [16-18] 18  BP: (116-124)/(60-70) 124/70    Intake/Output                         08/25/23 0701 - 08/26/23 0700 08/26/23 0701 - 08/27/23 0700     7209-9184 6987-3307 Total 9878-0743 4564-4258 Total                 Intake    P.O.  1440  120 1560  1080  120 1200    Total Intake 4799 946 8163 3043 148 5134       Output    Dialysis  --  -- --  734  879 1613    Total Output -- -- --              Physical Exam:  08/26/2023 reviewed as below  8/27/2023 reviewed no change  General Appearance : alert, pleasant, appears stated age, cooperative and alert  Lungs : clear to auscultation, respirations regular  Heart :  regular rhythm & normal rate, normal S1, S2 and no murmur, no rub  Abdomen : soft, non distended  Extremities : Trace edema   Neurologic :   orientated to person, place, time and situation, Grossly no focal deficits        Laboratory Data :     Albumin No results found for: ALBUMIN       Magnesium No results found for: MG           PTH               No results found for: PTH    CBC and coagulation:  Results from last 7 days   Lab Units 08/26/23  0020 08/24/23  0100   WBC 10*3/mm3 5.54 4.96   HEMOGLOBIN g/dL 8.3* 8.5*   HEMATOCRIT % 26.4* 27.3*   MCV fL 97.8* 95.5   MCHC g/dL 31.4* 31.1*   PLATELETS 10*3/mm3 155 141     Acid/base balance:      Renal and electrolytes:    Results from last 7 days   Lab Units 08/26/23  0020 08/24/23  0100 08/22/23  0139 08/21/23  0138   SODIUM mmol/L 133* 133* 134* 137   POTASSIUM mmol/L 4.1 4.1 3.7 3.2*   MAGNESIUM  mg/dL  --  2.0  --   --    CHLORIDE mmol/L 98 96* 97* 100   CO2 mmol/L 23.3 24.5 24.2 23.8   BUN mg/dL 61* 45* 41* 43*   CREATININE mg/dL 4.69* 4.78* 4.97* 4.89*   CALCIUM mg/dL 7.4* 7.4* 6.8* 7.2*     Estimated Creatinine Clearance: 10.6 mL/min (A) (by C-G formula based on SCr of 4.69 mg/dL (H)).  @GFRCG:3@   Liver and pancreatic function:  Results from last 7 days   Lab Units 08/24/23  0100   ALBUMIN g/dL 2.7*   BILIRUBIN mg/dL <0.2   ALK PHOS U/L 106   AST (SGOT) U/L 22   ALT (SGPT) U/L 14         Cardiac:      Liver and pancreatic function:  Results from last 7 days   Lab Units 08/24/23  0100   ALBUMIN g/dL 2.7*   BILIRUBIN mg/dL <0.2   ALK PHOS U/L 106   AST (SGOT) U/L 22   ALT (SGPT) U/L 14       Medications :     amLODIPine, 10 mg, Oral, Daily  aspirin, 81 mg, Oral, Daily  atorvastatin, 80 mg, Oral, Nightly  castor oil-balsam peru, 1 application , Topical, Q12H  FLUoxetine, 40 mg, Oral, Daily  heparin (porcine), 5,000 Units, Subcutaneous, Q12H  insulin glargine, 12 Units, Subcutaneous, Nightly  insulin lispro, 2-7 Units, Subcutaneous, 4x Daily AC & at Bedtime  isosorbide mononitrate, 30 mg, Oral, Daily  levothyroxine, 25 mcg, Oral, Daily  metoprolol succinate XL, 25 mg, Oral, Daily  multivitamin, 1 tablet, Oral, Daily  pantoprazole, 40 mg, Oral, Daily  rOPINIRole, 0.5 mg, Oral, BID  traZODone, 50 mg, Oral, Nightly             Assessment & Plan     1.  End-stage renal disease on peritoneal dialysis  2.  Type 2 diabetes with nephropathy  3.  Chronic diarrhea now worse  4.  New onset hypothyroidism  5.  Frailty  6.  Hypertension  7.  Malnutrition  8.  Diabetic neuropathy and myopathy with inability to ambulate independently  9.  Anemia of CKD    08/25/2023   grossly stable from renal standpoint continue on nightly PD with 1.5% dialysate  Discussed in detail with Gisela PD nurse at [facility], there is no family member yet available to be trained for assistance in peritoneal dialysis.  Patient is going to tell us  back by Monday to anticipate discharge planning, if patient needs to be transitioned to hemodialysis before discharge    08/26/2023   patient tolerated peritoneal dialysis well yesterday we will continue 1.5% dialysate solution finalize plan of discharge by Monday 8/27/2023  Patient is feeling better after doing peritoneal dialysis we will continue 1.5% dialysate    Plan of care was discussed with the patient, understood verbalized agreed        S Crispin Burks MD  08/27/23  09:37 EDT

## 2023-08-27 NOTE — PLAN OF CARE
Problem: Rehabilitation (IRF) Plan of Care  Goal: Plan of Care Review  Outcome: Ongoing, Progressing  Flowsheets (Taken 8/27/2023 1415)  Progress: improving  Plan of Care Reviewed With: patient  Outcome Evaluation: All alarms audible. No acute distress noted. Call light and items within reach. Continue current plan of care.  Goal: Patient-Specific Goal (Individualized)  Outcome: Ongoing, Progressing  Goal: Absence of New-Onset Illness or Injury  Outcome: Ongoing, Progressing  Intervention: Prevent Fall and Fall Injury  Recent Flowsheet Documentation  Taken 8/27/2023 1200 by Vicky Phillips RN  Safety Promotion/Fall Prevention:   safety round/check completed   nonskid shoes/slippers when out of bed   assistive device/personal items within reach  Taken 8/27/2023 1006 by Vicky Phillips RN  Safety Promotion/Fall Prevention:   safety round/check completed   nonskid shoes/slippers when out of bed   assistive device/personal items within reach  Taken 8/27/2023 0806 by Vicky Phillips RN  Safety Promotion/Fall Prevention:   safety round/check completed   nonskid shoes/slippers when out of bed   assistive device/personal items within reach  Intervention: Prevent Infection  Recent Flowsheet Documentation  Taken 8/27/2023 1200 by Vicky Phillips RN  Infection Prevention:   hand hygiene promoted   rest/sleep promoted  Taken 8/27/2023 1006 by Vicky Phillips RN  Infection Prevention:   hand hygiene promoted   rest/sleep promoted  Taken 8/27/2023 0806 by Vicky Phillips RN  Infection Prevention:   hand hygiene promoted   rest/sleep promoted  Goal: Optimal Comfort and Wellbeing  Outcome: Ongoing, Progressing  Goal: Home and Community Transition Plan Established  Outcome: Ongoing, Progressing   Goal Outcome Evaluation:  Plan of Care Reviewed With: patient        Progress: improving  Outcome Evaluation: All alarms audible. No acute distress noted. Call light and items within reach. Continue current plan of care.

## 2023-08-27 NOTE — PROGRESS NOTES
Rehabilitation Nursing  Inpatient Rehabilitation Plan of Care Note    Plan of Care  Copy from POCSafety    Potential for Injury (Active)  Current Status (8/23/2023 7:00:00 PM): NO FALLS  Weekly Goal: NO FALLS  Discharge Goal: NO FALLS    Body Systems    Integumentary (Active)  Current Status (8/23/2023 7:00:00 PM): NO FURTHER BREAKDOWN  Weekly Goal: NO FURTHER BREAKDOWN  Discharge Goal: NO FURTHER BREAKDOWN    Signed by: Frances Meyer, Nurse

## 2023-08-28 VITALS
HEIGHT: 64 IN | RESPIRATION RATE: 18 BRPM | SYSTOLIC BLOOD PRESSURE: 131 MMHG | DIASTOLIC BLOOD PRESSURE: 75 MMHG | OXYGEN SATURATION: 94 % | TEMPERATURE: 97.6 F | HEART RATE: 68 BPM | WEIGHT: 124 LBS | BODY MASS INDEX: 21.17 KG/M2

## 2023-08-28 LAB
ANION GAP SERPL CALCULATED.3IONS-SCNC: 12 MMOL/L (ref 5–15)
BASOPHILS # BLD AUTO: 0.04 10*3/MM3 (ref 0–0.2)
BASOPHILS NFR BLD AUTO: 0.8 % (ref 0–1.5)
BUN SERPL-MCNC: 77 MG/DL (ref 8–23)
BUN/CREAT SERPL: 16.1 (ref 7–25)
CALCIUM SPEC-SCNC: 7.6 MG/DL (ref 8.6–10.5)
CHLORIDE SERPL-SCNC: 92 MMOL/L (ref 98–107)
CO2 SERPL-SCNC: 23 MMOL/L (ref 22–29)
CREAT SERPL-MCNC: 4.78 MG/DL (ref 0.57–1)
DEPRECATED RDW RBC AUTO: 48.7 FL (ref 37–54)
EGFRCR SERPLBLD CKD-EPI 2021: 9.6 ML/MIN/1.73
EOSINOPHIL # BLD AUTO: 0.34 10*3/MM3 (ref 0–0.4)
EOSINOPHIL NFR BLD AUTO: 6.8 % (ref 0.3–6.2)
ERYTHROCYTE [DISTWIDTH] IN BLOOD BY AUTOMATED COUNT: 13.9 % (ref 12.3–15.4)
GLUCOSE BLDC GLUCOMTR-MCNC: 195 MG/DL (ref 70–130)
GLUCOSE BLDC GLUCOMTR-MCNC: 264 MG/DL (ref 70–130)
GLUCOSE BLDC GLUCOMTR-MCNC: 295 MG/DL (ref 70–130)
GLUCOSE BLDC GLUCOMTR-MCNC: 304 MG/DL (ref 70–130)
GLUCOSE SERPL-MCNC: 203 MG/DL (ref 65–99)
HCT VFR BLD AUTO: 25.3 % (ref 34–46.6)
HGB BLD-MCNC: 7.8 G/DL (ref 12–15.9)
IMM GRANULOCYTES # BLD AUTO: 0.05 10*3/MM3 (ref 0–0.05)
IMM GRANULOCYTES NFR BLD AUTO: 1 % (ref 0–0.5)
LYMPHOCYTES # BLD AUTO: 1.27 10*3/MM3 (ref 0.7–3.1)
LYMPHOCYTES NFR BLD AUTO: 25.6 % (ref 19.6–45.3)
MCH RBC QN AUTO: 29.8 PG (ref 26.6–33)
MCHC RBC AUTO-ENTMCNC: 30.8 G/DL (ref 31.5–35.7)
MCV RBC AUTO: 96.6 FL (ref 79–97)
MONOCYTES # BLD AUTO: 0.4 10*3/MM3 (ref 0.1–0.9)
MONOCYTES NFR BLD AUTO: 8 % (ref 5–12)
NEUTROPHILS NFR BLD AUTO: 2.87 10*3/MM3 (ref 1.7–7)
NEUTROPHILS NFR BLD AUTO: 57.8 % (ref 42.7–76)
NRBC BLD AUTO-RTO: 0 /100 WBC (ref 0–0.2)
PLATELET # BLD AUTO: 128 10*3/MM3 (ref 140–450)
PMV BLD AUTO: 10.3 FL (ref 6–12)
POTASSIUM SERPL-SCNC: 4.6 MMOL/L (ref 3.5–5.2)
RBC # BLD AUTO: 2.62 10*6/MM3 (ref 3.77–5.28)
SODIUM SERPL-SCNC: 127 MMOL/L (ref 136–145)
WBC NRBC COR # BLD: 4.97 10*3/MM3 (ref 3.4–10.8)

## 2023-08-28 PROCEDURE — 80048 BASIC METABOLIC PNL TOTAL CA: CPT | Performed by: FAMILY MEDICINE

## 2023-08-28 PROCEDURE — 97116 GAIT TRAINING THERAPY: CPT

## 2023-08-28 PROCEDURE — 97530 THERAPEUTIC ACTIVITIES: CPT

## 2023-08-28 PROCEDURE — 85025 COMPLETE CBC W/AUTO DIFF WBC: CPT | Performed by: FAMILY MEDICINE

## 2023-08-28 PROCEDURE — 97110 THERAPEUTIC EXERCISES: CPT

## 2023-08-28 PROCEDURE — 63710000001 INSULIN GLARGINE PER 5 UNITS: Performed by: FAMILY MEDICINE

## 2023-08-28 PROCEDURE — 63710000001 INSULIN LISPRO (HUMAN) PER 5 UNITS: Performed by: INTERNAL MEDICINE

## 2023-08-28 PROCEDURE — 25010000002 HEPARIN (PORCINE) PER 1000 UNITS: Performed by: FAMILY MEDICINE

## 2023-08-28 PROCEDURE — 97535 SELF CARE MNGMENT TRAINING: CPT

## 2023-08-28 PROCEDURE — 82948 REAGENT STRIP/BLOOD GLUCOSE: CPT

## 2023-08-28 PROCEDURE — 99232 SBSQ HOSP IP/OBS MODERATE 35: CPT | Performed by: INTERNAL MEDICINE

## 2023-08-28 RX ORDER — DEXTROSE MONOHYDRATE 25 G/50ML
25 INJECTION, SOLUTION INTRAVENOUS
Status: DISCONTINUED | OUTPATIENT
Start: 2023-08-28 | End: 2023-08-29

## 2023-08-28 RX ORDER — FLUTICASONE PROPIONATE 50 MCG
2 SPRAY, SUSPENSION (ML) NASAL DAILY
Status: DISCONTINUED | OUTPATIENT
Start: 2023-08-28 | End: 2023-09-07 | Stop reason: HOSPADM

## 2023-08-28 RX ORDER — INSULIN LISPRO 100 [IU]/ML
2-9 INJECTION, SOLUTION INTRAVENOUS; SUBCUTANEOUS
Status: DISCONTINUED | OUTPATIENT
Start: 2023-08-28 | End: 2023-08-29

## 2023-08-28 RX ORDER — GLUCAGON 1 MG/ML
1 KIT INJECTION
Status: DISCONTINUED | OUTPATIENT
Start: 2023-08-28 | End: 2023-08-29

## 2023-08-28 RX ORDER — NICOTINE POLACRILEX 4 MG
15 LOZENGE BUCCAL
Status: DISCONTINUED | OUTPATIENT
Start: 2023-08-28 | End: 2023-08-29

## 2023-08-28 RX ADMIN — CASTOR OIL AND BALSAM, PERU 1 APPLICATION: 788; 87 OINTMENT TOPICAL at 09:53

## 2023-08-28 RX ADMIN — AMLODIPINE BESYLATE 10 MG: 10 TABLET ORAL at 09:51

## 2023-08-28 RX ADMIN — ATORVASTATIN CALCIUM 80 MG: 40 TABLET, FILM COATED ORAL at 20:48

## 2023-08-28 RX ADMIN — HYDROXYZINE HYDROCHLORIDE 12.5 MG: 25 TABLET, FILM COATED ORAL at 20:54

## 2023-08-28 RX ADMIN — FLUTICASONE PROPIONATE 2 SPRAY: 50 SPRAY, METERED NASAL at 20:52

## 2023-08-28 RX ADMIN — ROPINIROLE HYDROCHLORIDE 0.5 MG: 0.25 TABLET, FILM COATED ORAL at 09:52

## 2023-08-28 RX ADMIN — INSULIN LISPRO 2 UNITS: 100 INJECTION, SOLUTION INTRAVENOUS; SUBCUTANEOUS at 09:53

## 2023-08-28 RX ADMIN — HYDROCODONE BITARTRATE AND ACETAMINOPHEN 1 TABLET: 10; 325 TABLET ORAL at 20:55

## 2023-08-28 RX ADMIN — ROPINIROLE HYDROCHLORIDE 0.5 MG: 0.25 TABLET, FILM COATED ORAL at 20:48

## 2023-08-28 RX ADMIN — ASPIRIN 81 MG: 81 TABLET, COATED ORAL at 09:51

## 2023-08-28 RX ADMIN — LEVOTHYROXINE SODIUM 25 MCG: 25 TABLET ORAL at 09:52

## 2023-08-28 RX ADMIN — INSULIN LISPRO 6 UNITS: 100 INJECTION, SOLUTION INTRAVENOUS; SUBCUTANEOUS at 12:01

## 2023-08-28 RX ADMIN — HEPARIN SODIUM 5000 UNITS: 5000 INJECTION INTRAVENOUS; SUBCUTANEOUS at 09:52

## 2023-08-28 RX ADMIN — INSULIN GLARGINE 12 UNITS: 100 INJECTION, SOLUTION SUBCUTANEOUS at 20:48

## 2023-08-28 RX ADMIN — METOPROLOL SUCCINATE 25 MG: 25 TABLET, EXTENDED RELEASE ORAL at 09:52

## 2023-08-28 RX ADMIN — Medication 1 TABLET: at 09:52

## 2023-08-28 RX ADMIN — CASTOR OIL AND BALSAM, PERU 1 APPLICATION: 788; 87 OINTMENT TOPICAL at 20:49

## 2023-08-28 RX ADMIN — ISOSORBIDE MONONITRATE 30 MG: 30 TABLET, EXTENDED RELEASE ORAL at 09:52

## 2023-08-28 RX ADMIN — PANTOPRAZOLE SODIUM 40 MG: 40 TABLET, DELAYED RELEASE ORAL at 09:52

## 2023-08-28 RX ADMIN — FLUOXETINE HYDROCHLORIDE 40 MG: 20 CAPSULE ORAL at 09:51

## 2023-08-28 RX ADMIN — HEPARIN SODIUM 5000 UNITS: 5000 INJECTION INTRAVENOUS; SUBCUTANEOUS at 20:49

## 2023-08-28 RX ADMIN — TRAZODONE HYDROCHLORIDE 50 MG: 50 TABLET ORAL at 20:48

## 2023-08-28 RX ADMIN — INSULIN LISPRO 6 UNITS: 100 INJECTION, SOLUTION INTRAVENOUS; SUBCUTANEOUS at 17:03

## 2023-08-28 NOTE — PROGRESS NOTES
Rehabilitation Nursing  Inpatient Rehabilitation Plan of Care Note    Plan of Care  Safety    Potential for Injury (Active)  Current Status (8/23/2023 7:00:00 PM): NO FALLS  Weekly Goal: NO FALLS  Discharge Goal: NO FALLS    Body Systems    Integumentary (Active)  Current Status (8/23/2023 7:00:00 PM): NO FURTHER BREAKDOWN  Weekly Goal: NO FURTHER BREAKDOWN  Discharge Goal: NO FURTHER BREAKDOWN    Signed by: Neli Esquivel RN

## 2023-08-28 NOTE — THERAPY TREATMENT NOTE
Inpatient Rehabilitation - Occupational Therapy Treatment Note    [facility]     Patient Name: Reny Elena  : 1960  MRN: [id number]    Today's Date: 2023                 Admit Date: 2023       No diagnosis found.    Patient Active Problem List   Diagnosis    Tibia/fibula fracture    Iron deficiency anemia    Type 2 diabetes mellitus with hyperglycemia, with long-term current use of insulin    Wound of right ankle    Cellulitis    Metabolic encephalopathy    History of non-ST elevation myocardial infarction (NSTEMI)    HTN (hypertension)    CVA (cerebral vascular accident)    Chronic diastolic CHF (congestive heart failure)    Decubitus ulcer of coccyx, unspecified pressure ulcer stage    Depression    Expressive aphasia    Impaired mobility and ADLs    Hyperkalemia    Subdural hematoma    Severe malnutrition    Cerebrovascular accident (CVA), unspecified mechanism    PRES (posterior reversible encephalopathy syndrome)    Debility       Past Medical History:   Diagnosis Date    Arthritis     Closed fracture of right fibula and tibia 2018    Depression     Diabetic ulcer of left foot associated with type 2 diabetes mellitus 2017    Diastolic dysfunction     Disease of thyroid gland     Elevated cholesterol     End stage renal disease     Essential hypertension     GERD (gastroesophageal reflux disease)     History of transfusion     Hyperlipidemia     Iron deficiency anemia 2018    PAD (peripheral artery disease)     Renal failure     Type 2 diabetes mellitus with hyperglycemia, with long-term current use of insulin 2018       Past Surgical History:   Procedure Laterality Date    ABDOMINAL SURGERY      BACK SURGERY      Post spinal diskectomy, osteophytectomy in one lumbar interspace    CATARACT EXTRACTION      Left      SECTION      DENTAL PROCEDURE      ENDOSCOPY      FRACTURE SURGERY      right leg    HYSTERECTOMY      INCISION AND DRAINAGE LEG Left 4/15/2017     Procedure: INCISION AND DRAINAGE LOWER EXTREMITY;  Surgeon: Basilio Morris MD;  Location: [facility] OR;  Service:     INCISION AND DRAINAGE LEG Left 5/26/2017    Procedure: Irrigation and Debridment abcess diabetic wound left foot with deep culture;  Surgeon: Basilio Morris MD;  Location: [facility] OR;  Service:     INSERTION PERITONEAL DIALYSIS CATHETER N/A 12/16/2021    Procedure: INSERTION PERITONEAL DIALYSIS CATHETER LAPAROSCOPIC;  Surgeon: Edy Glover MD;  Location: [facility] OR;  Service: General;  Laterality: N/A;    KNEE ARTHROSCOPY Right     ORIF TIBIA FRACTURE Right 12/28/2018    Procedure: TIBIAL  FRACTURE OPEN REDUCTION INTERNAL FIXATION;  Surgeon: Jung Barragan MD;  Location: [facility] OR;  Service: Orthopedics    TOE AMPUTATION Right     5th    TUBAL ABDOMINAL LIGATION               IRF OT ASSESSMENT FLOWSHEET (last 12 hours)       IRF OT Evaluation and Treatment       Row Name 08/28/23 1459          OT Time and Intention    Document Type daily treatment  -     Mode of Treatment occupational therapy  -     Symptoms Noted During/After Treatment none  -       Row Name 08/28/23 1459          General Information    Patient/Family/Caregiver Comments/Observations agreeable to therapy  -     Existing Precautions/Restrictions fall  PD catheter  -       Row Name 08/28/23 1459          Cognition/Psychosocial    Follows Commands (Cognition) verbal cues/prompting required  -       Row Name 08/28/23 1459          Grooming    [place] Level (Grooming) set up;supervision  -       Row Name 08/28/23 1459          Self-Feeding    [place] Level (Self-Feeding) set up  -       Row Name 08/28/23 1459          Bed-Chair Transfer    Bed-Chair [place] (Transfers) minimum assist (75% patient effort);verbal cues  -     Assistive Device (Bed-Chair Transfers) wheelchair  -       Row Name 08/28/23 1459          Chair-Bed Transfer    Chair-Bed [place] (Transfers) minimum assist (75% patient  effort);moderate assist (50% patient effort);verbal cues  -     Assistive Device (Chair-Bed Transfers) wheelchair  -       Row Name 08/28/23 1459          Motor Skills    Motor Skills functional endurance;coordination;motor control/coordination interventions  -     Motor Control/Coordination Interventions therapeutic exercise/ROM;gross motor coordination activities;fine motor manipulation/dexterity activities  BUE ther ex/act, AROM, bilat coord ex, GMC/FMC,  strengthening to increase ADL status  -     Therapeutic Exercise --  hand exerciser, bilat kim  -       Row Name 08/28/23 1459          Positioning and Restraints    Post Treatment Position wheelchair  -     In Bed call light within reach;encouraged to call for assist;exit alarm on;notified nsg;heels elevated  am  -     In Wheelchair with PT  pm  -               User Key  (r) = Recorded By, (t) = Taken By, (c) = Cosigned By      Initials Name Effective Dates     Nichelle Lagunas, OT 06/16/21 -                      Occupational Therapy Education       Title: PT OT SLP Therapies (Done)       Topic: Occupational Therapy (Done)       Point: ADL training (Done)       Description:   Instruct learner(s) on proper safety adaptation and remediation techniques during self care or transfers.   Instruct in proper use of assistive devices.                  Learning Progress Summary             Patient Acceptance, E,D, NR,VU by  at 8/28/2023 1505    Acceptance, TB, NR by DL at 8/27/2023 2309    Acceptance, E, VU,NR by HB at 8/26/2023 1417    Acceptance, E, VU,NR by BF at 8/25/2023 1438    Acceptance, E, VU,NR by BF at 8/24/2023 1450                         Point: Precautions (Done)       Description:   Instruct learner(s) on prescribed precautions during self-care and functional transfers.                  Learning Progress Summary             Patient Acceptance, E,D, NR,VU by  at 8/28/2023 1505    Acceptance, TB, NR by DL at 8/27/2023 2300     Acceptance, E, VU,NR by  at 8/26/2023 1417    Acceptance, E, VU,NR by BF at 8/25/2023 1438    Acceptance, E, VU,NR by BF at 8/24/2023 1450                                         User Key       Initials Effective Dates Name Provider Type Discipline    BF 07/11/23 -  Yael Mendez OT Occupational Therapist OT    CJ 06/16/21 -  Nichelle Lagunas OT Occupational Therapist OT     05/25/21 -  Ella Wallace OT Occupational Therapist OT     03/16/22 -  Claudine Lundberg, RN Registered Nurse Nurse                        OT Recommendation and Plan                         Time Calculation:      Time Calculation- OT       Row Name 08/28/23 1506 08/28/23 1504          Time Calculation- OT    OT Start Time 1240  - 1105  -     OT Stop Time 1330  - 1145  -     OT Time Calculation (min) 50 min  - 40 min  -     Total Timed Code Minutes- OT 50 minute(s)  - 40 minute(s)  -               User Key  (r) = Recorded By, (t) = Taken By, (c) = Cosigned By      Initials Name Provider Type     Nichelle Lagunas OT Occupational Therapist                  Therapy Charges for Today       Code Description Service Date Service Provider Modifiers Qty    27920413024 HC OT SELF CARE/MGMT/TRAIN EA 15 MIN 8/28/2023 Nichelle Lagunas OT GO 1    12169839037 HC OT THER PROC EA 15 MIN 8/28/2023 Nichelle Lagunas OT GO 2    96935699785 HC OT THERAPEUTIC ACT EA 15 MIN 8/28/2023 Nichelle Lagunas OT GO 3                     Nichelle Lagunas OT  8/28/2023

## 2023-08-28 NOTE — THERAPY TREATMENT NOTE
Inpatient Rehabilitation - Physical Therapy Treatment Note       EDWARD Veliz     Patient Name: Reny Elena  : 1960  MRN: [id number]    Today's Date: 2023                    Admit Date: 2023      Visit Dx:   No diagnosis found.    Patient Active Problem List   Diagnosis    Tibia/fibula fracture    Iron deficiency anemia    Type 2 diabetes mellitus with hyperglycemia, with long-term current use of insulin    Wound of right ankle    Cellulitis    Metabolic encephalopathy    History of non-ST elevation myocardial infarction (NSTEMI)    HTN (hypertension)    CVA (cerebral vascular accident)    Chronic diastolic CHF (congestive heart failure)    Decubitus ulcer of coccyx, unspecified pressure ulcer stage    Depression    Expressive aphasia    Impaired mobility and ADLs    Hyperkalemia    Subdural hematoma    Severe malnutrition    Cerebrovascular accident (CVA), unspecified mechanism    PRES (posterior reversible encephalopathy syndrome)    Debility       Past Medical History:   Diagnosis Date    Arthritis     Closed fracture of right fibula and tibia 2018    Depression     Diabetic ulcer of left foot associated with type 2 diabetes mellitus 2017    Diastolic dysfunction     Disease of thyroid gland     Elevated cholesterol     End stage renal disease     Essential hypertension     GERD (gastroesophageal reflux disease)     History of transfusion     Hyperlipidemia     Iron deficiency anemia 2018    PAD (peripheral artery disease)     Renal failure     Type 2 diabetes mellitus with hyperglycemia, with long-term current use of insulin 2018       Past Surgical History:   Procedure Laterality Date    ABDOMINAL SURGERY      BACK SURGERY      Post spinal diskectomy, osteophytectomy in one lumbar interspace    CATARACT EXTRACTION      Left      SECTION      DENTAL PROCEDURE      ENDOSCOPY      FRACTURE SURGERY      right leg    HYSTERECTOMY      INCISION AND DRAINAGE LEG Left  4/15/2017    Procedure: INCISION AND DRAINAGE LOWER EXTREMITY;  Surgeon: Basilio Morris MD;  Location: [facility] OR;  Service:     INCISION AND DRAINAGE LEG Left 5/26/2017    Procedure: Irrigation and Debridment abcess diabetic wound left foot with deep culture;  Surgeon: Basilio Morris MD;  Location: [facility] OR;  Service:     INSERTION PERITONEAL DIALYSIS CATHETER N/A 12/16/2021    Procedure: INSERTION PERITONEAL DIALYSIS CATHETER LAPAROSCOPIC;  Surgeon: Edy Glover MD;  Location: [facility] OR;  Service: General;  Laterality: N/A;    KNEE ARTHROSCOPY Right     ORIF TIBIA FRACTURE Right 12/28/2018    Procedure: TIBIAL  FRACTURE OPEN REDUCTION INTERNAL FIXATION;  Surgeon: Jung Barragan MD;  Location: [facility] OR;  Service: Orthopedics    TOE AMPUTATION Right     5th    TUBAL ABDOMINAL LIGATION         PT ASSESSMENT (last 12 hours)       IRF PT Evaluation and Treatment       Row Name 08/28/23 1447 08/28/23 1431       PT Time and Intention    Document Type daily treatment  - daily treatment  -RG    Mode of Treatment individual therapy;physical therapy  - individual therapy;physical therapy  -RG    Patient/Family/Caregiver Comments/Observations Pt and RN in agreement for AM PT session. Pt walked 20' then 60' with RW min A. Sitting exercises completed until tolerance at end of session with added Wt and resistance.  - Pt and nursing in agreement for skilled PT on this date.  C/o pain on glutes while sitting.  -      Row Name 08/28/23 1447 08/28/23 1431       General Information    Existing Precautions/Restrictions fall  peritoneal dialysis port at ABD, L patella subluxation  - fall  peritoneal dialysis port at ABD, L patella subluxation  -      Row Name 08/28/23 1447 08/28/23 1431       Pain Scale: FACES Pre/Post-Treatment    Pain: FACES Scale, Pretreatment 4-->hurts little more  -HC 4-->hurts little more  -RG    Posttreatment Pain Rating 4-->hurts little more  -HC 4-->hurts little more  -      Row Name  08/28/23 1447 08/28/23 1431       Cognition/Psychosocial    Affect/Mental Status (Cognition) WFL  - WFL  -RG    Orientation Status (Cognition) oriented x 3  -HC oriented x 3  -RG    Follows Commands (Cognition) verbal cues/prompting required;physical/tactile prompts required  - verbal cues/prompting required;physical/tactile prompts required  -RG    Personal Safety Interventions fall prevention program maintained;gait belt;nonskid shoes/slippers when out of bed;supervised activity  - fall prevention program maintained;gait belt;nonskid shoes/slippers when out of bed  -RG    Cognitive Function WFL  -HC WFL  -RG      Row Name 08/28/23 1447 08/28/23 1431       Bed Mobility    Sit-Supine [place] (Bed Mobility) minimum assist (75% patient effort)  - --    Supine-Sit-Supine [place] (Bed Mobility) -- minimum assist (75% patient effort);verbal cues;nonverbal cues (demo/gesture)  -      Row Name 08/28/23 1447 08/28/23 1431       Chair-Bed Transfer    Chair-Bed [place] (Transfers) minimum assist (75% patient effort);verbal cues;nonverbal cues (demo/gesture);moderate assist (50% patient effort)  - minimum assist (75% patient effort);verbal cues;nonverbal cues (demo/gesture);moderate assist (50% patient effort)  -RG    Assistive Device (Chair-Bed Transfers) wheelchair  - wheelchair  -      Row Name 08/28/23 1447 08/28/23 1431       Sit-Stand Transfer    Sit-Stand [place] (Transfers) minimum assist (75% patient effort);verbal cues;nonverbal cues (demo/gesture);moderate assist (50% patient effort)  - minimum assist (75% patient effort);verbal cues;nonverbal cues (demo/gesture);moderate assist (50% patient effort)  -RG    Assistive Device (Sit-Stand Transfers) wheelchair;walker, front-wheeled  - wheelchair;walker, front-wheeled  -RG      Row Name 08/28/23 1447 08/28/23 1431       Stand-Sit Transfer    Stand-Sit [place] (Transfers) minimum assist (75% patient effort);verbal  cues;nonverbal cues (demo/gesture);moderate assist (50% patient effort)  - minimum assist (75% patient effort);verbal cues;nonverbal cues (demo/gesture);moderate assist (50% patient effort)  -    Assistive Device (Stand-Sit Transfers) wheelchair;walker, front-wheeled  - wheelchair;walker, front-wheeled  -      Row Name 23 1447 23 143       Gait/Stairs (Locomotion)    Gait/Stairs Locomotion gait/ambulation independence;gait/ambulation assistive device;distance ambulated  -HC --    [place] Level (Gait) minimum assist (75% patient effort);verbal cues;nonverbal cues (demo/gesture)  -HC --    Assistive Device (Gait) walker, front-wheeled  -HC --    Distance in Feet (Gait) 20', 20'  -HC --    Pattern (Gait) step-through  -HC --    Deviations/Abnormal Patterns (Gait) casa decreased;gait speed decreased;stride length decreased  -HC --    Bilateral Gait Deviations forward flexed posture;heel strike decreased  -HC --    Comment, (Gait/Stairs) -- did not ambulate this session.  -      Row Name 23 1447 23 143       Safety Issues, Functional Mobility    Impairments Affecting Function (Mobility) balance;endurance/activity tolerance;pain;range of motion (ROM);strength  - balance;endurance/activity tolerance;pain;range of motion (ROM);strength  -      Row Name 23 14423 143       Balance    Dynamic Sitting Balance -- independent;supervision;verbal cues;non-verbal cues (demo/gesture)  -    Comment, Balance Sitin# AP, LAQ, March, Ball Sq.  GTB: HS curls, hip abd.  -HC bean bag toss sitting unsupported edge of mat  -      Row Name 23 14423 143       Hip (Therapeutic Exercise)    Hip Strengthening (Therapeutic Exercise) bilateral;flexion;extension;aBduction;aDduction;marching while seated;sitting;1 lb free weight;resistance band;green  - bilateral;flexion;aBduction;aDduction;external rotation;internal rotation;heel slides;supine;10 repetitions;2 sets   -      Row Name 08/28/23 1447 08/28/23 1431       Knee (Therapeutic Exercise)    Knee Strengthening (Therapeutic Exercise) bilateral;flexion;extension;marching while seated;LAQ (long arc quad);hamstring curls;sitting;2 lb free weight;resistance band;green  -HC bilateral;flexion;extension;SLR (straight leg raise);SAQ (short arc quad);heel slides;10 repetitions;2 sets  -      Row Name 08/28/23 1447 08/28/23 1431       Ankle (Therapeutic Exercise)    Ankle Strengthening (Therapeutic Exercise) bilateral;dorsiflexion;plantarflexion;sitting;1 lb free weight  -HC bilateral;dorsiflexion;plantarflexion;supine;10 repetitions;2 sets  -      Row Name 08/28/23 1447 08/28/23 1431       Positioning and Restraints    Pre-Treatment Position other (comment)  in WC  -HC sitting in chair/recliner  -RG    Post Treatment Position bed  - bed  -RG    In Bed fowlers;call light within reach;encouraged to call for assist;side rails up x2  - notified nsg;side lying left;call light within reach;encouraged to call for assist;pillow between legs  -      Row Name 08/28/23 1447 08/28/23 1431       Therapy Assessment/Plan (PT)    Patient's Goals For Discharge return home  - return home  -      Row Name 08/28/23 1447 08/28/23 1431       Therapy Assessment/Plan (PT)    Rehab Potential/Prognosis (PT) adequate, monitor progress closely  - adequate, monitor progress closely  -    Frequency of Treatment (PT) 5 times per week  -HC 5 times per week  -RG    Estimated Duration of Therapy (PT) 2 weeks  - 2 weeks  -RG    Problem List (PT) balance;mobility;range of motion (ROM);strength;pain  - balance;mobility;range of motion (ROM);strength;pain  -RG    Activity Limitations Related to Problem List (PT) unable to ambulate safely;unable to transfer safely  - unable to ambulate safely;unable to transfer safely  -      Row Name 08/28/23 1447 08/28/23 1431       Therapy Plan Review/Discharge Plan (PT)    Anticipated Equipment Needs at  Discharge (PT Eval) --  tbd  -HC --  tbd  -RG    Anticipated Discharge Disposition (PT) home with assist;home with home health;home with outpatient therapy services  - home with assist;home with home health;home with outpatient therapy services  -RG      Row Name 08/28/23 1447 08/28/23 1431       IRF PT Goals    Bed Mobility Goal Selection (PT-IRF) bed mobility, PT goal 1  -HC bed mobility, PT goal 1  -RG    Transfer Goal Selection (PT-IRF) transfers, PT goal 1  -HC transfers, PT goal 1  -RG    Gait (Walking Locomotion) Goal Selection (PT-IRF) gait, PT goal 1  -HC gait, PT goal 1  -RG    Stairs Goal Selection (PT-IRF) stairs, PT goal 1  -HC stairs, PT goal 1  -RG      Row Name 08/28/23 1447 08/28/23 1431       Bed Mobility Goal 1 (PT-IRF)    Activity/Assistive Device (Bed Mobility Goal 1, PT-IRF) sit to supine/supine to sit  -HC sit to supine/supine to sit  -RG    [place] Level (Bed Mobility Goal 1, PT-IRF) independent  -HC independent  -RG    Time Frame (Bed Mobility Goal 1, PT-IRF) by discharge  - by discharge  -RG      Row Name 08/28/23 1447 08/28/23 1431       Transfer Goal 1 (PT-IRF)    Activity/Assistive Device (Transfer Goal 1, PT-IRF) sit-to-stand/stand-to-sit;bed-to-chair/chair-to-bed  -HC sit-to-stand/stand-to-sit;bed-to-chair/chair-to-bed  -RG    [place] Level (Transfer Goal 1, PT-IRF) modified independence  -HC modified independence  -RG    Time Frame (Transfer Goal 1, PT-IRF) by discharge  - by discharge  -RG      Row Name 08/28/23 1447 08/28/23 1431       Gait/Walking Locomotion Goal 1 (PT-IRF)    Activity/Assistive Device (Gait/Walking Locomotion Goal 1, PT-IRF) walker, rolling  -HC walker, rolling  -RG    Gait/Walking Locomotion Distance Goal 1 (PT-IRF) 300'  -'  -RG    [place] Level (Gait/Walking Locomotion Goal 1, PT-IRF) supervision required  -HC supervision required  -RG    Time Frame (Gait/Walking Locomotion Goal 1, PT-IRF) by discharge  - by discharge  -RG      Row  Name 08/28/23 1447 08/28/23 1431       Stairs Goal 1 (PT-IRF)    Activity/Assistive Device (Stairs Goal 1, PT-IRF) ascending stairs;descending stairs;using handrail, left;using handrail, right  -HC ascending stairs;descending stairs;using handrail, left;using handrail, right  -RG    Number of Stairs (Stairs Goal 1, PT-IRF) 5  -HC 5  -RG    [place] Level (Stairs Goal 1, PT-IRF) supervision required  -HC supervision required  -RG    Time Frame (Stairs Goal 1, PT-IRF) by discharge  -HC by discharge  -RG              User Key  (r) = Recorded By, (t) = Taken By, (c) = Cosigned By      Initials Name Provider Type    RG Rajiv Pagan PTA Physical Therapist Assistant     Anaid Bo PTA Physical Therapist Assistant                  Wound 08/23/23 1923 Right posterior heel (Active)   Dressing Appearance open to air 08/27/23 2309   Closure None 08/28/23 0953   Base non-blanchable;maroon/purple 08/28/23 0953   Drainage Amount none 08/28/23 0953   Care, Wound barrier applied 08/27/23 2309   Dressing Care open to air 08/27/23 2309       Wound 08/23/23 2313 Left gluteal (Active)   Dressing Appearance no drainage 08/27/23 2309   Closure None 08/28/23 0953   Base non-blanchable 08/28/23 0953   Periwound blistered 08/28/23 0953   Drainage Amount none 08/28/23 0953     Physical Therapy Education       Title: PT OT SLP Therapies (In Progress)       Topic: Physical Therapy (Done)       Point: Mobility training (Done)       Learning Progress Summary             Patient Acceptance, E,TB, VU,DU by HC at 8/28/2023 1451    Acceptance, D,E, VU,NR by RG at 8/28/2023 1436    Acceptance, TB, NR by DL at 8/27/2023 2309    Acceptance, E,D, VU,NR by RG at 8/26/2023 1522    Acceptance, E,D, VU,NR by LL at 8/25/2023 1525    Acceptance, E,D, VU,NR by RG at 8/25/2023 1433    Acceptance, E, VU,NR by LB at 8/24/2023 1149                         Point: Home exercise program (Done)       Learning Progress Summary             Patient  Acceptance, E,TB, VU,DU by HC at 8/28/2023 1451    Acceptance, D,E, VU,NR by RG at 8/28/2023 1436    Acceptance, TB, NR by DL at 8/27/2023 2309    Acceptance, E,D, VU,NR by RG at 8/26/2023 1522    Acceptance, E,D, VU,NR by LL at 8/25/2023 1525    Acceptance, E,D, VU,NR by RG at 8/25/2023 1433    Acceptance, E, VU,NR by LB at 8/24/2023 1149                         Point: Body mechanics (Done)       Learning Progress Summary             Patient Acceptance, E,TB, VU,DU by HC at 8/28/2023 1451    Acceptance, D,E, VU,NR by RG at 8/28/2023 1436    Acceptance, TB, NR by DL at 8/27/2023 2309    Acceptance, E,D, VU,NR by RG at 8/26/2023 1522    Acceptance, E,D, VU,NR by LL at 8/25/2023 1525    Acceptance, E,D, VU,NR by RG at 8/25/2023 1433    Acceptance, E, VU,NR by LB at 8/24/2023 1149                         Point: Precautions (Done)       Learning Progress Summary             Patient Acceptance, E,TB, VU,DU by HC at 8/28/2023 1451    Acceptance, D,E, VU,NR by RG at 8/28/2023 1436    Acceptance, TB, NR by DL at 8/27/2023 2309    Acceptance, E,D, VU,NR by RG at 8/26/2023 1522    Acceptance, E,D, VU,NR by LL at 8/25/2023 1525    Acceptance, E,D, VU,NR by RG at 8/25/2023 1433    Acceptance, E, VU,NR by LB at 8/24/2023 1149                                         User Key       Initials Effective Dates Name Provider Type Discipline    LB 06/16/21 -  Christel Ferguson, PT Physical Therapist PT    LL 05/02/16 -  Carly Lundberg, PTA Physical Therapist Assistant PT    RG 06/16/21 -  Rajiv Pagan PTA Physical Therapist Assistant PT    HC 01/20/23 -  Anaid Bo, PTA Physical Therapist Assistant PT    DL 03/16/22 -  Claudine Lundberg, RN Registered Nurse Nurse                    PT Recommendation and Plan    Frequency of Treatment (PT): 5 times per week  Anticipated Equipment Needs at Discharge (PT Eval):  (tbd)                  Time Calculation:      PT Charges       Row Name 08/28/23 1451 08/28/23 8671           Time Calculation    Start Time 0915  - 1330  -     Stop Time 1000  - 1415  -RG     Time Calculation (min) 45 min  -HC 45 min  -RG     PT Received On 08/28/23  - 08/28/23  -RG        Time Calculation- PT    Total Timed Code Minutes- PT 45 minute(s)  -HC 45 minute(s)  -RG               User Key  (r) = Recorded By, (t) = Taken By, (c) = Cosigned By      Initials Name Provider Type    Rajiv Garner PTA Physical Therapist Assistant     Anaid Bo PTA Physical Therapist Assistant                    Therapy Charges for Today       Code Description Service Date Service Provider Modifiers Qty    62963777720 HC GAIT TRAINING EA 15 MIN 8/28/2023 Anaid Bo, SHITAL GP, CQ 1    84679315291 HC PT THER PROC EA 15 MIN 8/28/2023 Anaid Bo PTA GP, CQ 1    79933596945 HC PT THERAPEUTIC ACT EA 15 MIN 8/28/2023 Anaid Bo, SHITAL GP, CQ 1              PT G-Codes  AM-PAC 6 Clicks Score (PT): 17      Anaid Bo PTA  8/28/2023

## 2023-08-28 NOTE — THERAPY TREATMENT NOTE
Inpatient Rehabilitation - Physical Therapy Treatment Note       EDWARD Veliz     Patient Name: Reny Elena  : 1960  MRN: [id number]    Today's Date: 2023                    Admit Date: 2023      Visit Dx:   No diagnosis found.    Patient Active Problem List   Diagnosis    Tibia/fibula fracture    Iron deficiency anemia    Type 2 diabetes mellitus with hyperglycemia, with long-term current use of insulin    Wound of right ankle    Cellulitis    Metabolic encephalopathy    History of non-ST elevation myocardial infarction (NSTEMI)    HTN (hypertension)    CVA (cerebral vascular accident)    Chronic diastolic CHF (congestive heart failure)    Decubitus ulcer of coccyx, unspecified pressure ulcer stage    Depression    Expressive aphasia    Impaired mobility and ADLs    Hyperkalemia    Subdural hematoma    Severe malnutrition    Cerebrovascular accident (CVA), unspecified mechanism    PRES (posterior reversible encephalopathy syndrome)    Debility       Past Medical History:   Diagnosis Date    Arthritis     Closed fracture of right fibula and tibia 2018    Depression     Diabetic ulcer of left foot associated with type 2 diabetes mellitus 2017    Diastolic dysfunction     Disease of thyroid gland     Elevated cholesterol     End stage renal disease     Essential hypertension     GERD (gastroesophageal reflux disease)     History of transfusion     Hyperlipidemia     Iron deficiency anemia 2018    PAD (peripheral artery disease)     Renal failure     Type 2 diabetes mellitus with hyperglycemia, with long-term current use of insulin 2018       Past Surgical History:   Procedure Laterality Date    ABDOMINAL SURGERY      BACK SURGERY      Post spinal diskectomy, osteophytectomy in one lumbar interspace    CATARACT EXTRACTION      Left      SECTION      DENTAL PROCEDURE      ENDOSCOPY      FRACTURE SURGERY      right leg    HYSTERECTOMY      INCISION AND DRAINAGE LEG Left  4/15/2017    Procedure: INCISION AND DRAINAGE LOWER EXTREMITY;  Surgeon: Basilio Morris MD;  Location: [facility] OR;  Service:     INCISION AND DRAINAGE LEG Left 5/26/2017    Procedure: Irrigation and Debridment abcess diabetic wound left foot with deep culture;  Surgeon: Basilio Morris MD;  Location: [facility] OR;  Service:     INSERTION PERITONEAL DIALYSIS CATHETER N/A 12/16/2021    Procedure: INSERTION PERITONEAL DIALYSIS CATHETER LAPAROSCOPIC;  Surgeon: Edy Glover MD;  Location: [facility] OR;  Service: General;  Laterality: N/A;    KNEE ARTHROSCOPY Right     ORIF TIBIA FRACTURE Right 12/28/2018    Procedure: TIBIAL  FRACTURE OPEN REDUCTION INTERNAL FIXATION;  Surgeon: Jung Barragan MD;  Location: [facility] OR;  Service: Orthopedics    TOE AMPUTATION Right     5th    TUBAL ABDOMINAL LIGATION         PT ASSESSMENT (last 12 hours)       IRF PT Evaluation and Treatment       Row Name 08/28/23 1431          PT Time and Intention    Document Type daily treatment  -RG     Mode of Treatment individual therapy;physical therapy  -RG     Patient/Family/Caregiver Comments/Observations Pt and nursing in agreement for skilled PT on this date.  C/o pain on glutes while sitting.  -RG       Row Name 08/28/23 1431          General Information    Existing Precautions/Restrictions fall  peritoneal dialysis port at ABD, L patella subluxation  -RG       Row Name 08/28/23 1431          Pain Scale: FACES Pre/Post-Treatment    Pain: FACES Scale, Pretreatment 4-->hurts little more  -RG     Posttreatment Pain Rating 4-->hurts little more  -RG       Row Name 08/28/23 1431          Cognition/Psychosocial    Affect/Mental Status (Cognition) WFL  -RG     Orientation Status (Cognition) oriented x 3  -RG     Follows Commands (Cognition) verbal cues/prompting required;physical/tactile prompts required  -RG     Personal Safety Interventions fall prevention program maintained;gait belt;nonskid shoes/slippers when out of bed  -RG     Cognitive  Function WFL  -       Row Name 08/28/23 1431          Bed Mobility    Supine-Sit-Supine [place] (Bed Mobility) minimum assist (75% patient effort);verbal cues;nonverbal cues (demo/gesture)  -       Row Name 08/28/23 1431          Chair-Bed Transfer    Chair-Bed [place] (Transfers) minimum assist (75% patient effort);verbal cues;nonverbal cues (demo/gesture);moderate assist (50% patient effort)  -     Assistive Device (Chair-Bed Transfers) wheelchair  -       Row Name 08/28/23 1431          Sit-Stand Transfer    Sit-Stand [place] (Transfers) minimum assist (75% patient effort);verbal cues;nonverbal cues (demo/gesture);moderate assist (50% patient effort)  -     Assistive Device (Sit-Stand Transfers) wheelchair;walker, front-wheeled  -       Row Name 08/28/23 1431          Stand-Sit Transfer    Stand-Sit [place] (Transfers) minimum assist (75% patient effort);verbal cues;nonverbal cues (demo/gesture);moderate assist (50% patient effort)  -     Assistive Device (Stand-Sit Transfers) wheelchair;walker, front-wheeled  -       Row Name 08/28/23 1431          Gait/Stairs (Locomotion)    Comment, (Gait/Stairs) did not ambulate this session.  -       Row Name 08/28/23 1431          Safety Issues, Functional Mobility    Impairments Affecting Function (Mobility) balance;endurance/activity tolerance;pain;range of motion (ROM);strength  -[facility] Name 08/28/23 1431          Balance    Dynamic Sitting Balance independent;supervision;verbal cues;non-verbal cues (demo/gesture)  -     Comment, Balance bean bag toss sitting unsupported edge of mat  -       Row Name 08/28/23 1431          Hip (Therapeutic Exercise)    Hip Strengthening (Therapeutic Exercise) bilateral;flexion;aBduction;aDduction;external rotation;internal rotation;heel slides;supine;10 repetitions;2 sets  -[facility] Name 08/28/23 1431          Knee (Therapeutic Exercise)    Knee Strengthening (Therapeutic Exercise)  bilateral;flexion;extension;SLR (straight leg raise);SAQ (short arc quad);heel slides;10 repetitions;2 sets  -RG       Row Name 08/28/23 1431          Ankle (Therapeutic Exercise)    Ankle Strengthening (Therapeutic Exercise) bilateral;dorsiflexion;plantarflexion;supine;10 repetitions;2 sets  -RG       Row Name 08/28/23 1431          Positioning and Restraints    Pre-Treatment Position sitting in chair/recliner  -RG     Post Treatment Position bed  -RG     In Bed notified nsg;side lying left;call light within reach;encouraged to call for assist;pillow between legs  -RG       Row Name 08/28/23 1431          Therapy Assessment/Plan (PT)    Patient's Goals For Discharge return home  -       Row Name 08/28/23 1431          Therapy Assessment/Plan (PT)    Rehab Potential/Prognosis (PT) adequate, monitor progress closely  -RG     Frequency of Treatment (PT) 5 times per week  -RG     Estimated Duration of Therapy (PT) 2 weeks  -RG     Problem List (PT) balance;mobility;range of motion (ROM);strength;pain  -RG     Activity Limitations Related to Problem List (PT) unable to ambulate safely;unable to transfer safely  -       Row Name 08/28/23 1431          Therapy Plan Review/Discharge Plan (PT)    Anticipated Equipment Needs at Discharge (PT Eval) --  tbd  -RG     Anticipated Discharge Disposition (PT) home with assist;home with home health;home with outpatient therapy services  -       Row Name 08/28/23 1431          IRF PT Goals    Bed Mobility Goal Selection (PT-IRF) bed mobility, PT goal 1  -RG     Transfer Goal Selection (PT-IRF) transfers, PT goal 1  -RG     Gait (Walking Locomotion) Goal Selection (PT-IRF) gait, PT goal 1  -RG     Stairs Goal Selection (PT-IRF) stairs, PT goal 1  -RG       Row Name 08/28/23 1431          Bed Mobility Goal 1 (PT-IRF)    Activity/Assistive Device (Bed Mobility Goal 1, PT-IRF) sit to supine/supine to sit  -RG     [place] Level (Bed Mobility Goal 1, PT-IRF) independent  -RG      Time Frame (Bed Mobility Goal 1, PT-IRF) by discharge  -RG       Row Name 08/28/23 1431          Transfer Goal 1 (PT-IRF)    Activity/Assistive Device (Transfer Goal 1, PT-IRF) sit-to-stand/stand-to-sit;bed-to-chair/chair-to-bed  -RG     [place] Level (Transfer Goal 1, PT-IRF) modified independence  -RG     Time Frame (Transfer Goal 1, PT-IRF) by discharge  -RG       Row Name 08/28/23 1431          Gait/Walking Locomotion Goal 1 (PT-IRF)    Activity/Assistive Device (Gait/Walking Locomotion Goal 1, PT-IRF) walker, rolling  -RG     Gait/Walking Locomotion Distance Goal 1 (PT-IRF) 300'  -RG     [place] Level (Gait/Walking Locomotion Goal 1, PT-IRF) supervision required  -RG     Time Frame (Gait/Walking Locomotion Goal 1, PT-IRF) by discharge  -RG       Row Name 08/28/23 1431          Stairs Goal 1 (PT-IRF)    Activity/Assistive Device (Stairs Goal 1, PT-IRF) ascending stairs;descending stairs;using handrail, left;using handrail, right  -RG     Number of Stairs (Stairs Goal 1, PT-IRF) 5  -RG     [place] Level (Stairs Goal 1, PT-IRF) supervision required  -RG     Time Frame (Stairs Goal 1, PT-IRF) by discharge  -RG               User Key  (r) = Recorded By, (t) = Taken By, (c) = Cosigned By      Initials Name Provider Type    RG Rajiv Pagan PTA Physical Therapist Assistant                  Wound 08/23/23 1923 Right posterior heel (Active)   Dressing Appearance open to air 08/27/23 2309   Closure None 08/27/23 2309   Base non-blanchable;maroon/purple 08/27/23 2309   Drainage Amount none 08/27/23 2309   Care, Wound barrier applied 08/27/23 2309   Dressing Care open to air 08/27/23 2309       Wound 08/23/23 2313 Left gluteal (Active)   Dressing Appearance no drainage 08/27/23 2309   Closure None 08/27/23 2309   Base non-blanchable 08/27/23 2309   Periwound blistered 08/27/23 2309   Drainage Amount none 08/27/23 2309     Physical Therapy Education       Title: PT OT SLP Therapies (In Progress)        Topic: Physical Therapy (Done)       Point: Mobility training (Done)       Learning Progress Summary             Patient Acceptance, D,E, VU,NR by RG at 8/28/2023 1436    Acceptance, TB, NR by DL at 8/27/2023 2309    Acceptance, E,D, VU,NR by RG at 8/26/2023 1522    Acceptance, E,D, VU,NR by LL at 8/25/2023 1525    Acceptance, E,D, VU,NR by RG at 8/25/2023 1433    Acceptance, E, VU,NR by LB at 8/24/2023 1149                         Point: Home exercise program (Done)       Learning Progress Summary             Patient Acceptance, D,E, VU,NR by RG at 8/28/2023 1436    Acceptance, TB, NR by DL at 8/27/2023 2309    Acceptance, E,D, VU,NR by RG at 8/26/2023 1522    Acceptance, E,D, VU,NR by LL at 8/25/2023 1525    Acceptance, E,D, VU,NR by RG at 8/25/2023 1433    Acceptance, E, VU,NR by LB at 8/24/2023 1149                         Point: Body mechanics (Done)       Learning Progress Summary             Patient Acceptance, D,E, VU,NR by RG at 8/28/2023 1436    Acceptance, TB, NR by DL at 8/27/2023 2309    Acceptance, E,D, VU,NR by RG at 8/26/2023 1522    Acceptance, E,D, VU,NR by LL at 8/25/2023 1525    Acceptance, E,D, VU,NR by RG at 8/25/2023 1433    Acceptance, E, VU,NR by LB at 8/24/2023 1149                         Point: Precautions (Done)       Learning Progress Summary             Patient Acceptance, D,E, VU,NR by RG at 8/28/2023 1436    Acceptance, TB, NR by DL at 8/27/2023 2309    Acceptance, E,D, VU,NR by RG at 8/26/2023 1522    Acceptance, E,D, VU,NR by LL at 8/25/2023 1525    Acceptance, E,D, VU,NR by RG at 8/25/2023 1433    Acceptance, E, VU,NR by LB at 8/24/2023 1149                                         User Key       Initials Effective Dates Name Provider Type Discipline    LB 06/16/21 -  Christel Ferguson, PT Physical Therapist PT    LL 05/02/16 -  Carly Lundberg, SHITAL Physical Therapist Assistant PT    RG 06/16/21 -  Rajiv Pagan PTA Physical Therapist Assistant PT    DL 03/16/22 -  Toño  Claudine, RN Registered Nurse Nurse                    PT Recommendation and Plan    Frequency of Treatment (PT): 5 times per week  Anticipated Equipment Needs at Discharge (PT Eval):  (tbd)                  Time Calculation:      PT Charges       Row Name 08/28/23 1436             Time Calculation    Start Time 1330  -RG      Stop Time 1415  -RG      Time Calculation (min) 45 min  -RG      PT Received On 08/28/23  -RG         Time Calculation- PT    Total Timed Code Minutes- PT 45 minute(s)  -RG                User Key  (r) = Recorded By, (t) = Taken By, (c) = Cosigned By      Initials Name Provider Type    Rajiv Garner PTA Physical Therapist Assistant                    Therapy Charges for Today       Code Description Service Date Service Provider Modifiers Qty    09992760553 HC PT THERAPEUTIC ACT EA 15 MIN 8/28/2023 Rajiv Pagan PTA GP, CQ 1    50006296913 HC PT THER PROC EA 15 MIN 8/28/2023 Rajiv Pagan PTA GP, CQ 2              PT G-Codes  AM-PAC 6 Clicks Score (PT): 17      Rajiv Pagan PTA  8/28/2023

## 2023-08-28 NOTE — PROGRESS NOTES
Nephrology Progress Note      Subjective     No chest pain, shortness of breath    Objective       Vital signs :     Temp:  [97.6 °F (36.4 °C)-98.9 °F (37.2 °C)] 97.6 °F (36.4 °C)  Heart Rate:  [67-97] 68  Resp:  [16-20] 20  BP: ()/(58-91) 162/91    Intake/Output                         08/26/23 0701 - 08/27/23 0700 08/27/23 0701 - 08/28/23 0700     [id number] 7839-0307 Total 7447-1170 6210-5160 Total                 Intake    P.O.  1080  120 1200  1200  -- 1200    Total Intake 3853 697 8297 1200 -- 1200       Output    Dialysis  734  879 1613  --  773 773    Total Output  -- 773 773             Physical Exam:  General Appearance : alert, pleasant, appears stated age, cooperative and alert  Lungs : clear to auscultation, respirations regular  Heart :  regular rhythm & normal rate, normal S1, S2 and no murmur, no rub  Abdomen : soft, non distended  Extremities : Trace edema   Neurologic :   orientated to person, place, time and situation, Grossly no focal deficits        Laboratory Data :     Albumin No results found for: ALBUMIN       Magnesium No results found for: MG           PTH               No results found for: PTH    CBC and coagulation:  Results from last 7 days   Lab Units 08/28/23 0230 08/26/23 0020 08/24/23  0100   WBC 10*3/mm3 4.97 5.54 4.96   HEMOGLOBIN g/dL 7.8* 8.3* 8.5*   HEMATOCRIT % 25.3* 26.4* 27.3*   MCV fL 96.6 97.8* 95.5   MCHC g/dL 30.8* 31.4* 31.1*   PLATELETS 10*3/mm3 128* 155 141     Acid/base balance:      Renal and electrolytes:    Results from last 7 days   Lab Units 08/28/23 0230 08/26/23  0020 08/24/23  0100 08/22/23  0139   SODIUM mmol/L 127* 133* 133* 134*   POTASSIUM mmol/L 4.6 4.1 4.1 3.7   MAGNESIUM mg/dL  --   --  2.0  --    CHLORIDE mmol/L 92* 98 96* 97*   CO2 mmol/L 23.0 23.3 24.5 24.2   BUN mg/dL 77* 61* 45* 41*   CREATININE mg/dL 4.78* 4.69* 4.78* 4.97*   CALCIUM mg/dL 7.6* 7.4* 7.4* 6.8*     Estimated Creatinine Clearance: 10.4 mL/min (A) (by C-G formula  based on SCr of 4.78 mg/dL (H)).  @GFRCG:3@   Liver and pancreatic function:  Results from last 7 days   Lab Units 08/24/23  0100   ALBUMIN g/dL 2.7*   BILIRUBIN mg/dL <0.2   ALK PHOS U/L 106   AST (SGOT) U/L 22   ALT (SGPT) U/L 14         Cardiac:      Liver and pancreatic function:  Results from last 7 days   Lab Units 08/24/23  0100   ALBUMIN g/dL 2.7*   BILIRUBIN mg/dL <0.2   ALK PHOS U/L 106   AST (SGOT) U/L 22   ALT (SGPT) U/L 14       Medications :     amLODIPine, 10 mg, Oral, Daily  aspirin, 81 mg, Oral, Daily  atorvastatin, 80 mg, Oral, Nightly  castor oil-balsam peru, 1 application , Topical, Q12H  FLUoxetine, 40 mg, Oral, Daily  heparin (porcine), 5,000 Units, Subcutaneous, Q12H  insulin glargine, 12 Units, Subcutaneous, Nightly  insulin lispro, 2-7 Units, Subcutaneous, 4x Daily AC & at Bedtime  isosorbide mononitrate, 30 mg, Oral, Daily  levothyroxine, 25 mcg, Oral, Daily  metoprolol succinate XL, 25 mg, Oral, Daily  multivitamin, 1 tablet, Oral, Daily  pantoprazole, 40 mg, Oral, Daily  rOPINIRole, 0.5 mg, Oral, BID  traZODone, 50 mg, Oral, Nightly             Assessment & Plan     1.  End-stage renal disease on peritoneal dialysis  2.  Type 2 diabetes with nephropathy  3.  Chronic diarrhea now worse  4.  New onset hypothyroidism  5.  Frailty  6.  Hypertension  7.  Malnutrition  8.  Diabetic neuropathy and myopathy with inability to ambulate independently  9.  Anemia of CKD    Continue on PD using 1.5% dialysate.  Continue exploring the possibility of continuing home PT after discharge  Educated and counseled the patient    Plan of care was discussed with the patient, understood verbalized agreed        Nicholas Arroyo MD  08/28/23  09:15 EDT

## 2023-08-28 NOTE — PROGRESS NOTES
Rehabilitation Nursing  Inpatient Rehabilitation Plan of Care Note    Plan of Care  Copy from POCSafety    Potential for Injury (Active)  Current Status (8/23/2023 7:00:00 PM): NO FALLS  Weekly Goal: NO FALLS  Discharge Goal: NO FALLS    Body Systems    Integumentary (Active)  Current Status (8/23/2023 7:00:00 PM): NO FURTHER BREAKDOWN  Weekly Goal: NO FURTHER BREAKDOWN  Discharge Goal: NO FURTHER BREAKDOWN    Signed by: Beverly Snyder Nurse

## 2023-08-28 NOTE — SIGNIFICANT NOTE
08/28/23 0845   Plan   Plan Spoke to Dr. Arroyo about team conference in the am, daughter-in-law and daughter not being able to receive training for peritoneal dialysis.  Discussed significant other resuming PD if he is able after his discharge from hospital based on his recovery.  Plans can be determined regarding peritoneal dialysis or hemodialysis based on if she is able to return home with signficant other Cliff resuming care and PD.  Will follow.

## 2023-08-28 NOTE — PROGRESS NOTES
"Assisted By: Physical therapy    CC: Patient was seen while in the physical therapy department, currently no acute complaints.    Interview History/HPI: History was reviewed, she missed several dialysis sessions secondary to her  being ill.  She states she has been on hemodialysis for 2 years, he has always done a good job getting her \"hooked up\" and she has never had a peritoneal infection.  She thinks her  may go to home in the next day or 2 and thinks he will be able to continue her PD at home.  He has been in the hospital.  Patient denies any chest pain or shortness of breath.          Current Hospital Meds:  amLODIPine, 10 mg, Oral, Daily  aspirin, 81 mg, Oral, Daily  atorvastatin, 80 mg, Oral, Nightly  castor oil-balsam peru, 1 application , Topical, Q12H  FLUoxetine, 40 mg, Oral, Daily  heparin (porcine), 5,000 Units, Subcutaneous, Q12H  insulin glargine, 12 Units, Subcutaneous, Nightly  insulin lispro, 2-7 Units, Subcutaneous, 4x Daily AC & at Bedtime  isosorbide mononitrate, 30 mg, Oral, Daily  levothyroxine, 25 mcg, Oral, Daily  metoprolol succinate XL, 25 mg, Oral, Daily  multivitamin, 1 tablet, Oral, Daily  pantoprazole, 40 mg, Oral, Daily  rOPINIRole, 0.5 mg, Oral, BID  traZODone, 50 mg, Oral, Nightly         Vitals:    08/28/23 0700   BP: 162/91   Pulse: 68   Resp: 20   Temp: 97.6 °F (36.4 °C)   SpO2: 98%         Intake/Output Summary (Last 24 hours) at 8/28/2023 1042  Last data filed at 8/28/2023 0900  Gross per 24 hour   Intake 1440 ml   Output 773 ml   Net 667 ml       EXAM: She has 1+ edema.  Lungs are clear anterior and posterior, she can speak in full sentence without difficulty.  She appears chronically ill, heart is a regular rate and rhythm without murmur, abdomen is soft limited as she is sitting up.  PD catheter in place.      Diet: Regular/House Diet, Diabetic Diets; Consistent Carbohydrate; Texture: Regular Texture (IDDSI 7); Fluid Consistency: Thin (IDDSI 0)        LABS: "     Lab Results (last 48 hours)       Procedure Component Value Units Date/Time    POC Glucose Once [[id number]]  (Abnormal) Collected: 08/28/23 0617    Specimen: Blood Updated: 08/28/23 0624     Glucose 195 mg/dL     Basic Metabolic Panel [[id number]]  (Abnormal) Collected: 08/28/23 0230    Specimen: Blood Updated: 08/28/23 0341     Glucose 203 mg/dL      BUN 77 mg/dL      Creatinine 4.78 mg/dL      Sodium 127 mmol/L      Potassium 4.6 mmol/L      Chloride 92 mmol/L      CO2 23.0 mmol/L      Calcium 7.6 mg/dL      BUN/Creatinine Ratio 16.1     Anion Gap 12.0 mmol/L      eGFR 9.6 mL/min/1.73      Comment: <15 Indicative of kidney failure       Narrative:      GFR Normal >60  Chronic Kidney Disease <60  Kidney Failure <15      CBC & Differential [[id number]]  (Abnormal) Collected: 08/28/23 0230    Specimen: Blood Updated: 08/28/23 0256    Narrative:      The following orders were created for panel order CBC & Differential.  Procedure                               Abnormality         Status                     ---------                               -----------         ------                     CBC Auto Differential[[id number]]        Abnormal            Final result                 Please view results for these tests on the individual orders.    CBC Auto Differential [[id number]]  (Abnormal) Collected: 08/28/23 0230    Specimen: Blood Updated: 08/28/23 0256     WBC 4.97 10*3/mm3      RBC 2.62 10*6/mm3      Hemoglobin 7.8 g/dL      Hematocrit 25.3 %      MCV 96.6 fL      MCH 29.8 pg      MCHC 30.8 g/dL      RDW 13.9 %      RDW-SD 48.7 fl      MPV 10.3 fL      Platelets 128 10*3/mm3      Neutrophil % 57.8 %      Lymphocyte % 25.6 %      Monocyte % 8.0 %      Eosinophil % 6.8 %      Basophil % 0.8 %      Immature Grans % 1.0 %      Neutrophils, Absolute 2.87 10*3/mm3      Lymphocytes, Absolute 1.27 10*3/mm3      Monocytes, Absolute 0.40 10*3/mm3      Eosinophils, Absolute 0.34 10*3/mm3      Basophils, Absolute 0.04 10*3/mm3       Immature Grans, Absolute 0.05 10*3/mm3      nRBC 0.0 /100 WBC     POC Glucose Once [[id number]]  (Abnormal) Collected: 08/27/23 2034    Specimen: Blood Updated: 08/27/23 2041     Glucose 272 mg/dL     POC Glucose Once [[id number]]  (Abnormal) Collected: 08/27/23 1600    Specimen: Blood Updated: 08/27/23 1605     Glucose 271 mg/dL     POC Glucose Once [[id number]]  (Abnormal) Collected: 08/27/23 1126    Specimen: Blood Updated: 08/27/23 1142     Glucose 252 mg/dL     POC Glucose Once [[id number]]  (Normal) Collected: 08/27/23 0542    Specimen: Blood Updated: 08/27/23 0549     Glucose 105 mg/dL     POC Glucose Once [[id number]]  (Abnormal) Collected: 08/26/23 1942    Specimen: Blood Updated: 08/26/23 1949     Glucose 212 mg/dL     POC Glucose Once [[id number]]  (Abnormal) Collected: 08/26/23 1530    Specimen: Blood Updated: 08/26/23 1552     Glucose 256 mg/dL     POC Glucose Once [[id number]]  (Abnormal) Collected: 08/26/23 1112    Specimen: Blood Updated: 08/26/23 1120     Glucose 268 mg/dL                  Radiology:    Imaging Results (Last 72 Hours)       ** No results found for the last 72 hours. **            Results for orders placed during the hospital encounter of 08/19/23    Adult Transthoracic Echo Complete W/ Cont if Necessary Per Protocol    Interpretation Summary    Left ventricular systolic function is normal. Left ventricular ejection fraction appears to be 56 - 60%.    Left ventricular diastolic function is consistent with (grade I) impaired relaxation.    Left atrial volume is mildly increased.    Moderate mitral valve stenosis is present.    Estimated right ventricular systolic pressure from tricuspid regurgitation is mildly elevated (35-45 mmHg).    Mild pulmonary hypertension is present.    There is no evidence of pericardial effusion.      Assessment/Plan:   Debility status post prolonged hospitalization, patient is participating with occupational and physical therapy.  She is getting peritoneal  dialysis at night.  Nephrology note seen and appreciated.  With PT, patient is min assist with bed mobility, min to mod with sit to stand and stand to sit front wheeled walker and wheelchair.  Patient walked 20 feet x 2 with a front wheel walker min assist.  With OT, patient is mod assist with toileting hygiene, set up for upper body dressing, min assist lower body dressing, set up for grooming.  Continue current physical occupational therapy plan    ESRD, on PD, sodium is slightly down otherwise electrolytes acceptable, continue current dialysate, nephrology following.    Thrombocytopenia, she has had some overall fluctuations and diminishing readings but overall stable patient is on subcu heparin however will follow intermittently.    Anemia, probable chronic disease from ESRD, will leave decision about Procrit to nephrology.    Profound hypothyroidism, on replacement but at a very low dose.  Levothyroxine was listed as a home medication, this being the case I am going to recheck her thyroid status to trend and consider adjustment.    Hypertension, reviewing her readings, wide fluctuations but overall controlled, continue amlodipine and Toprol.    Diabetes, overall glucoses appear to be coming under better control but will continue to follow, I have adjusted the sliding scale.    DVT prophylaxis, subcu heparin    History of CVA continue aspirin and statin    HFpEF, appears compensated at this time.    Andrey Spence MD

## 2023-08-28 NOTE — SIGNIFICANT NOTE
08/28/23 6543   Plan   Plan Pt's significant other is being discharged from [facility] today.  Spoke to Gisela, PD nurse, at [facility] about significant other being discharged today and talking to Nephrologist about discharge plans.  Gisela to be contacted when discharge plans are confirmed.   Patient/Family in Agreement with Plan yes

## 2023-08-28 NOTE — PROGRESS NOTES
Inpatient Rehabilitation Functional Measures Assessment and Plan of Care    Plan of Care  Self Care    [OT] Toileting(Active)  Current Status(08/24/2023): Mod A  Weekly Goal(09/05/2023): Min A  Discharge Goal: CGA    Performed Intervention(s)  ADL retraining, AE education, GMC/FMC theract, strengthening, fxal mobility    Functional Measures  LINO Eating:  LINO Grooming:  LINO Bathing:  LINO Upper Body Dressing:  LINO Lower Body Dressing:  LINO Toileting:    LINO Bladder Management  Level of Assistance:  Frequency/Number of Accidents this Shift:    LINO Bowel Management  Level of Assistance:  Frequency/Number of Accidents this Shift:    LINO Bed/Chair/Wheelchair Transfer:  LINO Toilet Transfer:  LINO Tub/Shower Transfer:    Previously Documented Mode of Locomotion at Discharge:  [facility] Expected Mode of Locomotion at Discharge:  LINO Walk/Wheelchair:  [facility] Stairs:    LINO Comprehension:  LINO Expression:  LINO Social Interaction:  LINO Problem Solving:  LINO Memory:    Therapy Mode Minutes  Occupational Therapy: Individual: 90 minutes.  Physical Therapy:  Speech Language Pathology:    Discharge Functional Goals:    Signed by: Nichelle Lagunas Occupational Therapist

## 2023-08-29 LAB
ALBUMIN SERPL-MCNC: 2.5 G/DL (ref 3.5–5.2)
ALBUMIN/GLOB SERPL: 1 G/DL
ALP SERPL-CCNC: 101 U/L (ref 39–117)
ALT SERPL W P-5'-P-CCNC: 23 U/L (ref 1–33)
ANION GAP SERPL CALCULATED.3IONS-SCNC: 11.7 MMOL/L (ref 5–15)
AST SERPL-CCNC: 26 U/L (ref 1–32)
BASOPHILS # BLD AUTO: 0.04 10*3/MM3 (ref 0–0.2)
BASOPHILS NFR BLD AUTO: 0.9 % (ref 0–1.5)
BILIRUB SERPL-MCNC: 0.2 MG/DL (ref 0–1.2)
BUN SERPL-MCNC: 86 MG/DL (ref 8–23)
BUN/CREAT SERPL: 17.7 (ref 7–25)
CALCIUM SPEC-SCNC: 7.7 MG/DL (ref 8.6–10.5)
CHLORIDE SERPL-SCNC: 92 MMOL/L (ref 98–107)
CO2 SERPL-SCNC: 23.3 MMOL/L (ref 22–29)
CREAT SERPL-MCNC: 4.85 MG/DL (ref 0.57–1)
DEPRECATED RDW RBC AUTO: 46.6 FL (ref 37–54)
EGFRCR SERPLBLD CKD-EPI 2021: 9.4 ML/MIN/1.73
EOSINOPHIL # BLD AUTO: 0.24 10*3/MM3 (ref 0–0.4)
EOSINOPHIL NFR BLD AUTO: 5.2 % (ref 0.3–6.2)
ERYTHROCYTE [DISTWIDTH] IN BLOOD BY AUTOMATED COUNT: 13.7 % (ref 12.3–15.4)
GLOBULIN UR ELPH-MCNC: 2.4 GM/DL
GLUCOSE BLDC GLUCOMTR-MCNC: 189 MG/DL (ref 70–130)
GLUCOSE BLDC GLUCOMTR-MCNC: 208 MG/DL (ref 70–130)
GLUCOSE BLDC GLUCOMTR-MCNC: 226 MG/DL (ref 70–130)
GLUCOSE BLDC GLUCOMTR-MCNC: 242 MG/DL (ref 70–130)
GLUCOSE SERPL-MCNC: 292 MG/DL (ref 65–99)
HCT VFR BLD AUTO: 25.6 % (ref 34–46.6)
HGB BLD-MCNC: 8.2 G/DL (ref 12–15.9)
IMM GRANULOCYTES # BLD AUTO: 0.02 10*3/MM3 (ref 0–0.05)
IMM GRANULOCYTES NFR BLD AUTO: 0.4 % (ref 0–0.5)
LYMPHOCYTES # BLD AUTO: 1.06 10*3/MM3 (ref 0.7–3.1)
LYMPHOCYTES NFR BLD AUTO: 23.1 % (ref 19.6–45.3)
MCH RBC QN AUTO: 30 PG (ref 26.6–33)
MCHC RBC AUTO-ENTMCNC: 32 G/DL (ref 31.5–35.7)
MCV RBC AUTO: 93.8 FL (ref 79–97)
MONOCYTES # BLD AUTO: 0.38 10*3/MM3 (ref 0.1–0.9)
MONOCYTES NFR BLD AUTO: 8.3 % (ref 5–12)
NEUTROPHILS NFR BLD AUTO: 2.84 10*3/MM3 (ref 1.7–7)
NEUTROPHILS NFR BLD AUTO: 62.1 % (ref 42.7–76)
NRBC BLD AUTO-RTO: 0 /100 WBC (ref 0–0.2)
PLATELET # BLD AUTO: 151 10*3/MM3 (ref 140–450)
PMV BLD AUTO: 10.1 FL (ref 6–12)
POTASSIUM SERPL-SCNC: 4.7 MMOL/L (ref 3.5–5.2)
PROT SERPL-MCNC: 4.9 G/DL (ref 6–8.5)
RBC # BLD AUTO: 2.73 10*6/MM3 (ref 3.77–5.28)
SODIUM SERPL-SCNC: 127 MMOL/L (ref 136–145)
T4 FREE SERPL-MCNC: 0.32 NG/DL (ref 0.93–1.7)
TSH SERPL DL<=0.05 MIU/L-ACNC: 131.5 UIU/ML (ref 0.27–4.2)
WBC NRBC COR # BLD: 4.58 10*3/MM3 (ref 3.4–10.8)

## 2023-08-29 PROCEDURE — 63710000001 INSULIN GLARGINE PER 5 UNITS: Performed by: INTERNAL MEDICINE

## 2023-08-29 PROCEDURE — 85025 COMPLETE CBC W/AUTO DIFF WBC: CPT | Performed by: INTERNAL MEDICINE

## 2023-08-29 PROCEDURE — 84443 ASSAY THYROID STIM HORMONE: CPT | Performed by: INTERNAL MEDICINE

## 2023-08-29 PROCEDURE — 84439 ASSAY OF FREE THYROXINE: CPT | Performed by: INTERNAL MEDICINE

## 2023-08-29 PROCEDURE — 63710000001 INSULIN LISPRO (HUMAN) PER 5 UNITS: Performed by: INTERNAL MEDICINE

## 2023-08-29 PROCEDURE — 97110 THERAPEUTIC EXERCISES: CPT | Performed by: OCCUPATIONAL THERAPIST

## 2023-08-29 PROCEDURE — 25010000002 HEPARIN (PORCINE) PER 1000 UNITS: Performed by: FAMILY MEDICINE

## 2023-08-29 PROCEDURE — 97530 THERAPEUTIC ACTIVITIES: CPT | Performed by: OCCUPATIONAL THERAPIST

## 2023-08-29 PROCEDURE — 97535 SELF CARE MNGMENT TRAINING: CPT | Performed by: OCCUPATIONAL THERAPIST

## 2023-08-29 PROCEDURE — 97110 THERAPEUTIC EXERCISES: CPT

## 2023-08-29 PROCEDURE — 80053 COMPREHEN METABOLIC PANEL: CPT | Performed by: INTERNAL MEDICINE

## 2023-08-29 PROCEDURE — 97116 GAIT TRAINING THERAPY: CPT

## 2023-08-29 PROCEDURE — 82948 REAGENT STRIP/BLOOD GLUCOSE: CPT

## 2023-08-29 PROCEDURE — 97530 THERAPEUTIC ACTIVITIES: CPT

## 2023-08-29 PROCEDURE — 97112 NEUROMUSCULAR REEDUCATION: CPT

## 2023-08-29 RX ORDER — LEVOTHYROXINE SODIUM 0.05 MG/1
50 TABLET ORAL DAILY
Status: DISCONTINUED | OUTPATIENT
Start: 2023-08-29 | End: 2023-09-07 | Stop reason: HOSPADM

## 2023-08-29 RX ORDER — INSULIN LISPRO 100 [IU]/ML
3-14 INJECTION, SOLUTION INTRAVENOUS; SUBCUTANEOUS
Status: DISCONTINUED | OUTPATIENT
Start: 2023-08-29 | End: 2023-09-07 | Stop reason: HOSPADM

## 2023-08-29 RX ORDER — GLUCAGON 1 MG/ML
1 KIT INJECTION
Status: DISCONTINUED | OUTPATIENT
Start: 2023-08-29 | End: 2023-09-07 | Stop reason: HOSPADM

## 2023-08-29 RX ORDER — DEXTROSE MONOHYDRATE 25 G/50ML
25 INJECTION, SOLUTION INTRAVENOUS
Status: DISCONTINUED | OUTPATIENT
Start: 2023-08-29 | End: 2023-09-07 | Stop reason: HOSPADM

## 2023-08-29 RX ORDER — NICOTINE POLACRILEX 4 MG
15 LOZENGE BUCCAL
Status: DISCONTINUED | OUTPATIENT
Start: 2023-08-29 | End: 2023-09-07 | Stop reason: HOSPADM

## 2023-08-29 RX ADMIN — AMLODIPINE BESYLATE 10 MG: 10 TABLET ORAL at 08:47

## 2023-08-29 RX ADMIN — PANTOPRAZOLE SODIUM 40 MG: 40 TABLET, DELAYED RELEASE ORAL at 08:48

## 2023-08-29 RX ADMIN — CASTOR OIL AND BALSAM, PERU 1 APPLICATION: 788; 87 OINTMENT TOPICAL at 21:04

## 2023-08-29 RX ADMIN — HEPARIN SODIUM 5000 UNITS: 5000 INJECTION INTRAVENOUS; SUBCUTANEOUS at 21:04

## 2023-08-29 RX ADMIN — INSULIN LISPRO 3 UNITS: 100 INJECTION, SOLUTION INTRAVENOUS; SUBCUTANEOUS at 11:51

## 2023-08-29 RX ADMIN — ISOSORBIDE MONONITRATE 30 MG: 30 TABLET, EXTENDED RELEASE ORAL at 08:48

## 2023-08-29 RX ADMIN — INSULIN GLARGINE 12 UNITS: 100 INJECTION, SOLUTION SUBCUTANEOUS at 21:09

## 2023-08-29 RX ADMIN — ASPIRIN 81 MG: 81 TABLET, COATED ORAL at 08:47

## 2023-08-29 RX ADMIN — INSULIN LISPRO 5 UNITS: 100 INJECTION, SOLUTION INTRAVENOUS; SUBCUTANEOUS at 08:47

## 2023-08-29 RX ADMIN — ATORVASTATIN CALCIUM 80 MG: 40 TABLET, FILM COATED ORAL at 21:04

## 2023-08-29 RX ADMIN — HYDROXYZINE HYDROCHLORIDE 12.5 MG: 25 TABLET, FILM COATED ORAL at 21:06

## 2023-08-29 RX ADMIN — HEPARIN SODIUM 5000 UNITS: 5000 INJECTION INTRAVENOUS; SUBCUTANEOUS at 08:48

## 2023-08-29 RX ADMIN — FLUTICASONE PROPIONATE 2 SPRAY: 50 SPRAY, METERED NASAL at 08:51

## 2023-08-29 RX ADMIN — METOPROLOL SUCCINATE 25 MG: 25 TABLET, EXTENDED RELEASE ORAL at 08:48

## 2023-08-29 RX ADMIN — Medication 1 TABLET: at 08:48

## 2023-08-29 RX ADMIN — TIZANIDINE 4 MG: 4 TABLET ORAL at 14:56

## 2023-08-29 RX ADMIN — LEVOTHYROXINE SODIUM 50 MCG: 50 TABLET ORAL at 08:48

## 2023-08-29 RX ADMIN — FLUOXETINE HYDROCHLORIDE 40 MG: 20 CAPSULE ORAL at 08:48

## 2023-08-29 RX ADMIN — INSULIN LISPRO 5 UNITS: 100 INJECTION, SOLUTION INTRAVENOUS; SUBCUTANEOUS at 17:10

## 2023-08-29 RX ADMIN — ROPINIROLE HYDROCHLORIDE 0.5 MG: 0.25 TABLET, FILM COATED ORAL at 21:04

## 2023-08-29 RX ADMIN — CASTOR OIL AND BALSAM, PERU 1 APPLICATION: 788; 87 OINTMENT TOPICAL at 08:50

## 2023-08-29 RX ADMIN — INSULIN GLARGINE 12 UNITS: 100 INJECTION, SOLUTION SUBCUTANEOUS at 08:47

## 2023-08-29 RX ADMIN — TRAZODONE HYDROCHLORIDE 50 MG: 50 TABLET ORAL at 21:04

## 2023-08-29 RX ADMIN — HYDROCODONE BITARTRATE AND ACETAMINOPHEN 1 TABLET: 10; 325 TABLET ORAL at 11:55

## 2023-08-29 RX ADMIN — ROPINIROLE HYDROCHLORIDE 0.5 MG: 0.25 TABLET, FILM COATED ORAL at 08:48

## 2023-08-29 NOTE — PLAN OF CARE
Goal Outcome Evaluation:  Plan of Care Reviewed With: patient continues to participate in therapy, will continue to monitor.

## 2023-08-29 NOTE — PROGRESS NOTES
Nephrology Progress Note      Subjective     No chest pain mild shortness of breath on exertion    Objective       Vital signs :     Temp:  [97.6 °F (36.4 °C)-98.6 °F (37 °C)] 97.7 °F (36.5 °C)  Heart Rate:  [67-73] 67  Resp:  [16-18] 16  BP: (131-176)/(70-80) 176/80    Intake/Output                               08/27/23 0701 - 08/28/23 0700 08/28/23 0701 - 08/29/23 0700 08/29/23 0701 - 08/30/23 0700     [id number] 4996-2396 Total 7576-6499 7658-6094 Total 0437-3825 4571-1002 Total                    Intake    P.O.  1200  -- 1200  1080  120 1200  240  -- 240    Total Intake 1200 -- 1200 1215 543 0714 240 -- 240       Output    Dialysis  --  773 773  --  -- --  640  -- 640    Total Output -- 773 773 -- -- -- 640 -- 640             Physical Exam:  General Appearance : alert, pleasant, appears stated age, cooperative and alert  Lungs : Decreased intensity of breath sounds  Heart :  regular rhythm & normal rate, normal S1, S2 and no murmur, no rub  Abdomen : soft, non distended  Extremities : 1+ edema  Neurologic :   orientated to person, place, time and situation, Grossly no focal deficits        Laboratory Data :     Albumin Albumin   Date Value Ref Range Status   08/29/2023 2.5 (L) 3.5 - 5.2 g/dL Final          Magnesium No results found for: MG           PTH               No results found for: PTH    CBC and coagulation:  Results from last 7 days   Lab Units 08/29/23 0327 08/28/23 0230 08/26/23  0020   WBC 10*3/mm3 4.58 4.97 5.54   HEMOGLOBIN g/dL 8.2* 7.8* 8.3*   HEMATOCRIT % 25.6* 25.3* 26.4*   MCV fL 93.8 96.6 97.8*   MCHC g/dL 32.0 30.8* 31.4*   PLATELETS 10*3/mm3 151 128* 155     Acid/base balance:      Renal and electrolytes:    Results from last 7 days   Lab Units 08/29/23 0327 08/28/23 0230 08/26/23  0020 08/24/23  0100   SODIUM mmol/L 127* 127* 133* 133*   POTASSIUM mmol/L 4.7 4.6 4.1 4.1   MAGNESIUM mg/dL  --   --   --  2.0   CHLORIDE mmol/L 92* 92* 98 96*   CO2 mmol/L 23.3 23.0 23.3 24.5   BUN mg/dL  86* 77* 61* 45*   CREATININE mg/dL 4.85* 4.78* 4.69* 4.78*   CALCIUM mg/dL 7.7* 7.6* 7.4* 7.4*     Estimated Creatinine Clearance: 10.3 mL/min (A) (by C-G formula based on SCr of 4.85 mg/dL (H)).  @GFRCG:3@   Liver and pancreatic function:  Results from last 7 days   Lab Units 08/29/23  0327 08/24/23  0100   ALBUMIN g/dL 2.5* 2.7*   BILIRUBIN mg/dL 0.2 <0.2   ALK PHOS U/L 101 106   AST (SGOT) U/L 26 22   ALT (SGPT) U/L 23 14         Cardiac:      Liver and pancreatic function:  Results from last 7 days   Lab Units 08/29/23  0327 08/24/23  0100   ALBUMIN g/dL 2.5* 2.7*   BILIRUBIN mg/dL 0.2 <0.2   ALK PHOS U/L 101 106   AST (SGOT) U/L 26 22   ALT (SGPT) U/L 23 14       Medications :     amLODIPine, 10 mg, Oral, Daily  aspirin, 81 mg, Oral, Daily  atorvastatin, 80 mg, Oral, Nightly  castor oil-balsam peru, 1 application , Topical, Q12H  FLUoxetine, 40 mg, Oral, Daily  fluticasone, 2 spray, Each Nare, Daily  heparin (porcine), 5,000 Units, Subcutaneous, Q12H  insulin glargine, 12 Units, Subcutaneous, Q12H  insulin lispro, 3-14 Units, Subcutaneous, TID With Meals  isosorbide mononitrate, 30 mg, Oral, Daily  levothyroxine, 50 mcg, Oral, Daily  metoprolol succinate XL, 25 mg, Oral, Daily  multivitamin, 1 tablet, Oral, Daily  pantoprazole, 40 mg, Oral, Daily  rOPINIRole, 0.5 mg, Oral, BID  traZODone, 50 mg, Oral, Nightly             Assessment & Plan     1.  End-stage renal disease on peritoneal dialysis  2.  Type 2 diabetes with nephropathy  3.  Chronic diarrhea now worse  4.  New onset hypothyroidism  5.  Hyponatremia, multifactorial including hypovolemic  6.  Hypertension  7.  Malnutrition  8.  Diabetic neuropathy and myopathy with inability to ambulate independently  9.  Anemia of CKD    Fluid restriction 800 cc in 24 hours, will use 1.5% and 2.5% mix dialysate today.  Increased ultrafiltration.  Part of hyponatremia is pseudohyponatremia in setting of triglyceridemia  Educated and counseled the patient    Plan of care  was discussed with the patient, understood verbalized agreed        Nicholas Arroyo MD  08/29/23  07:46 EDT

## 2023-08-29 NOTE — THERAPY TREATMENT NOTE
Inpatient Rehabilitation - Occupational Therapy Treatment Note    [facility]     Patient Name: Reny Elena  : 1960  MRN: [id number]    Today's Date: 2023                 Admit Date: 2023       No diagnosis found.    Patient Active Problem List   Diagnosis    Tibia/fibula fracture    Iron deficiency anemia    Type 2 diabetes mellitus with hyperglycemia, with long-term current use of insulin    Wound of right ankle    Cellulitis    Metabolic encephalopathy    History of non-ST elevation myocardial infarction (NSTEMI)    HTN (hypertension)    CVA (cerebral vascular accident)    Chronic diastolic CHF (congestive heart failure)    Decubitus ulcer of coccyx, unspecified pressure ulcer stage    Depression    Expressive aphasia    Impaired mobility and ADLs    Hyperkalemia    Subdural hematoma    Severe malnutrition    Cerebrovascular accident (CVA), unspecified mechanism    PRES (posterior reversible encephalopathy syndrome)    Debility       Past Medical History:   Diagnosis Date    Arthritis     Closed fracture of right fibula and tibia 2018    Depression     Diabetic ulcer of left foot associated with type 2 diabetes mellitus 2017    Diastolic dysfunction     Disease of thyroid gland     Elevated cholesterol     End stage renal disease     Essential hypertension     GERD (gastroesophageal reflux disease)     History of transfusion     Hyperlipidemia     Iron deficiency anemia 2018    PAD (peripheral artery disease)     Renal failure     Type 2 diabetes mellitus with hyperglycemia, with long-term current use of insulin 2018       Past Surgical History:   Procedure Laterality Date    ABDOMINAL SURGERY      BACK SURGERY      Post spinal diskectomy, osteophytectomy in one lumbar interspace    CATARACT EXTRACTION      Left      SECTION      DENTAL PROCEDURE      ENDOSCOPY      FRACTURE SURGERY      right leg    HYSTERECTOMY      INCISION AND DRAINAGE LEG Left 4/15/2017     Procedure: INCISION AND DRAINAGE LOWER EXTREMITY;  Surgeon: Basilio Morris MD;  Location:  [facility] OR;  Service:     INCISION AND DRAINAGE LEG Left 5/26/2017    Procedure: Irrigation and Debridment abcess diabetic wound left foot with deep culture;  Surgeon: Basilio Morris MD;  Location:  [facility] OR;  Service:     INSERTION PERITONEAL DIALYSIS CATHETER N/A 12/16/2021    Procedure: INSERTION PERITONEAL DIALYSIS CATHETER LAPAROSCOPIC;  Surgeon: Edy Glover MD;  Location: [facility] OR;  Service: General;  Laterality: N/A;    KNEE ARTHROSCOPY Right     ORIF TIBIA FRACTURE Right 12/28/2018    Procedure: TIBIAL  FRACTURE OPEN REDUCTION INTERNAL FIXATION;  Surgeon: Jung Barragan MD;  Location: [facility] OR;  Service: Orthopedics    TOE AMPUTATION Right     5th    TUBAL ABDOMINAL LIGATION               IRF OT ASSESSMENT FLOWSHEET (last 12 hours)       IRF OT Evaluation and Treatment       Row Name 08/29/23 1448          OT Time and Intention    Document Type daily treatment  -BF     Mode of Treatment occupational therapy  -BF     Patient Effort good  -BF     Symptoms Noted During/After Treatment fatigue  -BF       Row Name 08/29/23 1448          General Information    Existing Precautions/Restrictions fall  PD catheter  -BF       Row Name 08/29/23 1448          Cognition/Psychosocial    Orientation Status (Cognition) oriented x 3  -BF     Follows Commands (Cognition) follows one-step commands;verbal cues/prompting required  -BF       Row Name 08/29/23 1448          Grooming    [place] Level (Grooming) set up;supervision;verbal cues  -BF     Position (Grooming) supported sitting  -BF       Row Name 08/29/23 1448          Toileting    [place] Level (Toileting) moderate assist (50% patient effort);verbal cues  -BF     Assistive Device Use (Toileting) grab bar/safety frame  -BF     Position (Toileting) supported sitting  -BF       Row Name 08/29/23 1448          Bed-Chair Transfer    Bed-Chair [place] (Transfers)  minimum assist (75% patient effort);verbal cues  -BF     Assistive Device (Bed-Chair Transfers) wheelchair  -BF       Row Name 08/29/23 1448          Chair-Bed Transfer    Chair-Bed [place] (Transfers) minimum assist (75% patient effort);moderate assist (50% patient effort);verbal cues  -BF     Assistive Device (Chair-Bed Transfers) wheelchair  -BF       Row Name 08/29/23 1448          Toilet Transfer    [place] Level (Toilet Transfer) minimum assist (75% patient effort);verbal cues;nonverbal cues (demo/gesture)  -BF     Assistive Device (Toilet Transfer) grab bars/safety frame;wheelchair  -BF       Row Name 08/29/23 1448          Motor Skills    Motor Control/Coordination Interventions fine motor manipulation/dexterity activities;gross motor coordination activities;therapeutic exercise/ROM  BUE GMC/FMC theract, light strengthening; BUE PRE 3 mins X3, flexbar therex, wrist rolls  -BF       Row Name 08/29/23 1448          Positioning and Restraints    In Bed supine;call light within reach;encouraged to call for assist;legs elevated  after both sessions  -               User Key  (r) = Recorded By, (t) = Taken By, (c) = Cosigned By      Initials Name Effective Dates    BF Yael Mendez OT 07/11/23 -                      Occupational Therapy Education       Title: PT OT SLP Therapies (In Progress)       Topic: Occupational Therapy (Done)       Point: ADL training (Done)       Description:   Instruct learner(s) on proper safety adaptation and remediation techniques during self care or transfers.   Instruct in proper use of assistive devices.                  Learning Progress Summary             Patient Acceptance, E, VU,NR by BF at 8/29/2023 1447    Acceptance, TB, NR by DL at 8/28/2023 2048    Acceptance, E,D, NR,VU by CJ at 8/28/2023 1505    Acceptance, TB, NR by DL at 8/27/2023 2309    Acceptance, E, VU,NR by HB at 8/26/2023 1417    Acceptance, E, VU,NR by BF at 8/25/2023 1438    Acceptance, E,  VU,NR by BF at 8/24/2023 1450                         Point: Precautions (Done)       Description:   Instruct learner(s) on prescribed precautions during self-care and functional transfers.                  Learning Progress Summary             Patient Acceptance, E, VU,NR by BF at 8/29/2023 1447    Acceptance, TB, NR by DL at 8/28/2023 2048    Acceptance, E,D, NR,VU by CJ at 8/28/2023 1505    Acceptance, TB, NR by DL at 8/27/2023 2309    Acceptance, E, VU,NR by HB at 8/26/2023 1417    Acceptance, E, VU,NR by BF at 8/25/2023 1438    Acceptance, E, VU,NR by BF at 8/24/2023 1450                                         User Key       Initials Effective Dates Name Provider Type Discipline     07/11/23 -  Yael Mendez, OT Occupational Therapist OT    CJ 06/16/21 -  Nichelle Lagunas, OT Occupational Therapist OT    HB 05/25/21 -  Ella Wallace, OT Occupational Therapist OT    DL 03/16/22 -  Claudine Lundberg, RN Registered Nurse Nurse                        OT Recommendation and Plan    Planned Therapy Interventions (OT): activity tolerance training, adaptive equipment training, BADL retraining, ROM/therapeutic exercise, strengthening exercise, transfer/mobility retraining                    Time Calculation:      Time Calculation- OT       Row Name 08/29/23 1451 08/29/23 1105          Time Calculation- OT    OT Start Time 1330  -BF 1105  -BF     OT Stop Time 1425  -BF 1140  -BF     OT Time Calculation (min) 55 min  -BF 35 min  -BF     Total Timed Code Minutes- OT 55 minute(s)  -BF 35 minute(s)  -BF     OT Non-Billable Time (min) -- 10 min  -BF               User Key  (r) = Recorded By, (t) = Taken By, (c) = Cosigned By      Initials Name Provider Type     Yael Mendez OT Occupational Therapist                  Therapy Charges for Today       Code Description Service Date Service Provider Modifiers Qty    [id number]  OT SELF CARE/MGMT/TRAIN EA 15 MIN 8/29/2023 Yael Mendez OT GO 2     [id number]  OT THERAPEUTIC ACT EA 15 MIN 8/29/2023 Yael Mendez, OT GO 3    [id number]  OT THER PROC EA 15 MIN 8/29/2023 Yael Mendez OT GO 1                     Yael Mendez, OT  8/29/2023

## 2023-08-29 NOTE — PROGRESS NOTES
Occupational Therapy: Individual: 90 minutes.    Physical Therapy:    Speech Language Pathology:    Signed by: Yael Mendez OT

## 2023-08-29 NOTE — PROGRESS NOTES
Occupational Therapy:    Physical Therapy: Individual: 90 minutes.    Speech Language Pathology:    Signed by: Patrizia Hope PTA

## 2023-08-29 NOTE — SIGNIFICANT NOTE
08/29/23 6105   Plan   Plan Team conference held today.  Spoke to Gisela, PD nurse at [facility], [phone] about plans for pt to be discharged on 9-7-23 with significant other if he is able to meet caregiving needs and resume PD at home.  Gisela agreeable to this plan.

## 2023-08-29 NOTE — SIGNIFICANT NOTE
08/29/23 7587   Plan   Plan Spoke to pt and significant other Cliff on pt's room phone about plans for pt to be discharged on 9-7-23 and how she is doing.  Cliff says he is able to resume caregiving and PD at home when pt is discharged.  Discussed coming to rehab to observe pt in therapy/receive education and he does not feel he needs to come; he is confident he can take care of pt's needs at discharge.  Will follow.   Patient/Family in Agreement with Plan yes

## 2023-08-29 NOTE — PROGRESS NOTES
Case Management  Inpatient Rehabilitation Team Conference    Conference Date/Time: 8/29/2023 7:01:43 AM    Team Conference Attendees:  MD Shanice Montoya SW Jessica Bill, RN,   GLORIA Borges, PT  Yael Mendez OT    Demographics            Age: 63Y            Gender: Female    Admission Date: 8/23/2023 7:00:00 PM  Rehabilitation Diagnosis:  debility  Comorbidities:      Plan of Care  Anticipated Discharge Date/Estimated Length of Stay: 14 days  Anticipated Discharge Destination: Community discharge with assistance  Discharge Plan : Discharge plans will be determined based on caregiving needs.  Pt's significant other is in the hospital and he assisted with her care at home.  Medical Necessity Expected Level Rationale: good  Intensity and Duration: an average of 3 hours/5 days per week  Medical Supervision and 24 Hour Rehab Nursing: x  Physical Therapy: x  PT Intensity/Duration: PT 1.5 hours per day/5 days per week  Occupational Therapy: x  OT Intensity/Duration: OT 1.5 hours per day/5 days per week  Social Work: x  Therapeutic Recreation: x  Updated (if changes indicated)    Anticipated Discharge Date/Estimated Length of Stay:   9-7-23      Discharge Plan of Care:    Based on the patient's medical and functional status, their prognosis and  expected level of functional improvement is: good      Interdisciplinary Problem/Goals/Status  Safety    [RN] Potential for Injury(Active)  Current Status(08/23/2023): NO FALLS  Weekly Goal(09/01/2023): NO FALLS  Discharge Goal: NO FALLS        Body Systems    [RN] Integumentary(Active)  Current Status(08/23/2023): NO FURTHER BREAKDOWN  Weekly Goal(09/01/2023): NO FURTHER BREAKDOWN  Discharge Goal: NO FURTHER BREAKDOWN        Mobility    [PT] Bed/Chair/Wheelchair(Active)  Current Status(08/24/2023): min A  Weekly Goal(08/31/2023): MI  Discharge Goal: MI    [PT] Walk(Active)  Current Status(08/24/2023): amb 70' RW CGA  Weekly  Goal(08/31/2023): amb 300' RW Sup  Discharge Goal: amb 300' RW Sup        Self Care    [OT] Toileting(Active)  Current Status(08/24/2023): Mod A  Weekly Goal(09/05/2023): Min A  Discharge Goal: CGA    Comments: Pt plans to return home with significant other at discharge if he is  able to meet caregiving needs.    Signed by: PA Arnold    Physician CoSigned By: Andrey Spence 08/29/2023 15:25:08

## 2023-08-29 NOTE — THERAPY TREATMENT NOTE
Inpatient Rehabilitation - Physical Therapy Treatment Note       EDWARD Veliz     Patient Name: Reny Elena  : 1960  MRN: [id number]    Today's Date: 2023                    Admit Date: 2023      Visit Dx:   No diagnosis found.    Patient Active Problem List   Diagnosis    Tibia/fibula fracture    Iron deficiency anemia    Type 2 diabetes mellitus with hyperglycemia, with long-term current use of insulin    Wound of right ankle    Cellulitis    Metabolic encephalopathy    History of non-ST elevation myocardial infarction (NSTEMI)    HTN (hypertension)    CVA (cerebral vascular accident)    Chronic diastolic CHF (congestive heart failure)    Decubitus ulcer of coccyx, unspecified pressure ulcer stage    Depression    Expressive aphasia    Impaired mobility and ADLs    Hyperkalemia    Subdural hematoma    Severe malnutrition    Cerebrovascular accident (CVA), unspecified mechanism    PRES (posterior reversible encephalopathy syndrome)    Debility       Past Medical History:   Diagnosis Date    Arthritis     Closed fracture of right fibula and tibia 2018    Depression     Diabetic ulcer of left foot associated with type 2 diabetes mellitus 2017    Diastolic dysfunction     Disease of thyroid gland     Elevated cholesterol     End stage renal disease     Essential hypertension     GERD (gastroesophageal reflux disease)     History of transfusion     Hyperlipidemia     Iron deficiency anemia 2018    PAD (peripheral artery disease)     Renal failure     Type 2 diabetes mellitus with hyperglycemia, with long-term current use of insulin 2018       Past Surgical History:   Procedure Laterality Date    ABDOMINAL SURGERY      BACK SURGERY      Post spinal diskectomy, osteophytectomy in one lumbar interspace    CATARACT EXTRACTION      Left      SECTION      DENTAL PROCEDURE      ENDOSCOPY      FRACTURE SURGERY      right leg    HYSTERECTOMY      INCISION AND DRAINAGE LEG Left  4/15/2017    Procedure: INCISION AND DRAINAGE LOWER EXTREMITY;  Surgeon: Basilio Morris MD;  Location: [facility] OR;  Service:     INCISION AND DRAINAGE LEG Left 5/26/2017    Procedure: Irrigation and Debridment abcess diabetic wound left foot with deep culture;  Surgeon: Basilio Morris MD;  Location: [facility] OR;  Service:     INSERTION PERITONEAL DIALYSIS CATHETER N/A 12/16/2021    Procedure: INSERTION PERITONEAL DIALYSIS CATHETER LAPAROSCOPIC;  Surgeon: Edy Glover MD;  Location: [facility] OR;  Service: General;  Laterality: N/A;    KNEE ARTHROSCOPY Right     ORIF TIBIA FRACTURE Right 12/28/2018    Procedure: TIBIAL  FRACTURE OPEN REDUCTION INTERNAL FIXATION;  Surgeon: Jung Barragan MD;  Location: [facility] OR;  Service: Orthopedics    TOE AMPUTATION Right     5th    TUBAL ABDOMINAL LIGATION         PT ASSESSMENT (last 12 hours)       IRF PT Evaluation and Treatment       Row Name 08/29/23 1553          PT Time and Intention    Document Type daily treatment  -RM     Mode of Treatment individual therapy;physical therapy  -RM     Patient/Family/Caregiver Comments/Observations Pt reports BLE knee and ankle pain with weightbearing and activity this date.  -RM       Row Name 08/29/23 1553          General Information    Existing Precautions/Restrictions fall  peritoneal dialysis port at ABD, L patella subluxation  -RM       Row Name 08/29/23 1553          Pain Scale: FACES Pre/Post-Treatment    Pain: FACES Scale, Pretreatment 4-->hurts little more  -RM     Posttreatment Pain Rating 4-->hurts little more  -RM       Row Name 08/29/23 1553          Cognition/Psychosocial    Affect/Mental Status (Cognition) WFL  -RM     Orientation Status (Cognition) oriented x 3  -RM     Follows Commands (Cognition) verbal cues/prompting required;physical/tactile prompts required  -RM     Personal Safety Interventions gait belt;nonskid shoes/slippers when out of bed;fall prevention program maintained  -RM     Cognitive Function WFL  -RM        Row Name 08/29/23 1553          Bed Mobility    Supine-Sit [place] (Bed Mobility) contact guard  -RM     Assistive Device (Bed Mobility) bed rails  -RM       Row Name 08/29/23 1553          Chair-Bed Transfer    Chair-Bed [place] (Transfers) minimum assist (75% patient effort);verbal cues;nonverbal cues (demo/gesture)  -RM     Assistive Device (Chair-Bed Transfers) wheelchair  -RM       Row Name 08/29/23 1553          Sit-Stand Transfer    Sit-Stand [place] (Transfers) minimum assist (75% patient effort);verbal cues;nonverbal cues (demo/gesture)  -RM     Assistive Device (Sit-Stand Transfers) wheelchair;walker, front-wheeled  -RM       Row Name 08/29/23 1553          Stand-Sit Transfer    Stand-Sit [place] (Transfers) minimum assist (75% patient effort);verbal cues;nonverbal cues (demo/gesture);moderate assist (50% patient effort)  -RM     Assistive Device (Stand-Sit Transfers) wheelchair;walker, front-wheeled  -RM       Row Name 08/29/23 1553          Toilet Transfer    Type (Toilet Transfer) stand pivot/stand step  -RM     [place] Level (Toilet Transfer) minimum assist (75% patient effort);nonverbal cues (demo/gesture);verbal cues  -RM     Assistive Device (Toilet Transfer) wheelchair;grab bars/safety frame  -       Row Name 08/29/23 1553          Gait/Stairs (Locomotion)    Gait/Stairs Locomotion gait/ambulation independence;gait/ambulation assistive device;distance ambulated  -RM     [place] Level (Gait) minimum assist (75% patient effort);verbal cues;nonverbal cues (demo/gesture);moderate assist (50% patient effort)  -RM     Assistive Device (Gait) walker, front-wheeled  -RM     Distance in Feet (Gait) 40, 20  -RM     Pattern (Gait) step-through  -RM     Deviations/Abnormal Patterns (Gait) casa decreased;gait speed decreased;stride length decreased  -RM     Bilateral Gait Deviations forward flexed posture;heel strike decreased  -RM     Comment, (Gait/Stairs) Posterior LOB  noted upon initial stand; cues required for posture and weightshifting. Cues and assistance required for RW management; increased forward trunk flexion noted. Pt cued for increased step length bilaterally.  -RM       Row Name 08/29/23 1553          Safety Issues, Functional Mobility    Impairments Affecting Function (Mobility) balance;endurance/activity tolerance;pain;range of motion (ROM);strength  -RM       Row Name 08/29/23 1553          Balance    Dynamic Sitting Balance standby assist;supervision  bag toss with reach outside SHELIA to promote trunk control and core strengthening  -RM       Row Name 08/29/23 1553          Motor Skills    Therapeutic Exercise hip;knee;ankle  -RM       Row Name 08/29/23 1553          Hip (Therapeutic Exercise)    Hip Strengthening (Therapeutic Exercise) bilateral;flexion;extension;aBduction;aDduction;heel slides;marching while seated;marching while standing;mini squats;sitting;standing;1 lb free weight;resistance band;green;2 sets;10 repetitions;other (see comments)  #1 in sitting  -RM       Row Name 08/29/23 1553          Knee (Therapeutic Exercise)    Knee Strengthening (Therapeutic Exercise) bilateral;flexion;extension;heel slides;marching while seated;marching while standing;LAQ (long arc quad);hamstring curls;sitting;standing;1 lb free weight;resistance band;green;2 sets;10 repetitions;other (see comments)  #1 in sitting  -RM       Row Name 08/29/23 1553          Ankle (Therapeutic Exercise)    Ankle Strengthening (Therapeutic Exercise) bilateral;dorsiflexion;plantarflexion;sitting;standing;1 lb free weight;2 sets;10 repetitions;other (see comments)  #1 in sitting  -RM       Row Name 08/29/23 1553          Positioning and Restraints    Pre-Treatment Position in bed  -RM     Post Treatment Position wheelchair  -RM     In Wheelchair sitting;with OT  -RM       Row Name 08/29/23 1553          Therapy Assessment/Plan (PT)    Patient's Goals For Discharge return home  -RM       Row  Name 08/29/23 1553          Therapy Assessment/Plan (PT)    Rehab Potential/Prognosis (PT) adequate, monitor progress closely  -RM     Frequency of Treatment (PT) 5 times per week  -RM     Estimated Duration of Therapy (PT) 2 weeks  -RM     Problem List (PT) balance;mobility;range of motion (ROM);strength;pain  -RM     Activity Limitations Related to Problem List (PT) unable to ambulate safely;unable to transfer safely  -RM       Row Name 08/29/23 1553          Daily Progress Summary (PT)    Functional Goal Overall Progress (PT) progressing toward functional goals as expected  -RM     Daily Progress Summary (PT) Assistance required for functional mobility and ambulation this date; LE weakness also noted bilaterally. Continued skilled care required for further improvements to ensure maximum safe function upon D/C.  -RM     Impairments Still Limiting Function (PT) functional activity tolerance impairment;pain;strength deficit  -RM     Recommendations (PT) Continue per current POC  -RM       Row Name 08/29/23 1553          Therapy Plan Review/Discharge Plan (PT)    Anticipated Equipment Needs at Discharge (PT Eval) --  tbd  -RM     Anticipated Discharge Disposition (PT) home with assist;home with home health;home with outpatient therapy services  -RM       Row Name 08/29/23 1553          IRF PT Goals    Bed Mobility Goal Selection (PT-IRF) bed mobility, PT goal 1  -RM     Transfer Goal Selection (PT-IRF) transfers, PT goal 1  -RM     Gait (Walking Locomotion) Goal Selection (PT-IRF) gait, PT goal 1  -RM     Stairs Goal Selection (PT-IRF) stairs, PT goal 1  -RM       Row Name 08/29/23 1553          Bed Mobility Goal 1 (PT-IRF)    Activity/Assistive Device (Bed Mobility Goal 1, PT-IRF) sit to supine/supine to sit  -RM     [place] Level (Bed Mobility Goal 1, PT-IRF) independent  -RM     Time Frame (Bed Mobility Goal 1, PT-IRF) by discharge  -RM       Row Name 08/29/23 0443          Transfer Goal 1 (PT-IRF)     Activity/Assistive Device (Transfer Goal 1, PT-IRF) sit-to-stand/stand-to-sit;bed-to-chair/chair-to-bed  -RM     [place] Level (Transfer Goal 1, PT-IRF) modified independence  -RM     Time Frame (Transfer Goal 1, PT-IRF) by discharge  -RM       Row Name 08/29/23 1553          Gait/Walking Locomotion Goal 1 (PT-IRF)    Activity/Assistive Device (Gait/Walking Locomotion Goal 1, PT-IRF) walker, rolling  -RM     Gait/Walking Locomotion Distance Goal 1 (PT-IRF) 300'  -RM     [place] Level (Gait/Walking Locomotion Goal 1, PT-IRF) supervision required  -RM     Time Frame (Gait/Walking Locomotion Goal 1, PT-IRF) by discharge  -RM       Row Name 08/29/23 1553          Stairs Goal 1 (PT-IRF)    Activity/Assistive Device (Stairs Goal 1, PT-IRF) ascending stairs;descending stairs;using handrail, left;using handrail, right  -RM     Number of Stairs (Stairs Goal 1, PT-IRF) 5  -RM     [place] Level (Stairs Goal 1, PT-IRF) supervision required  -RM     Time Frame (Stairs Goal 1, PT-IRF) by discharge  -RM               User Key  (r) = Recorded By, (t) = Taken By, (c) = Cosigned By      Initials Name Provider Type    Patrizia Sauceda, PTA Physical Therapist Assistant                  Wound 08/23/23 1923 Right posterior heel (Active)   Dressing Appearance open to air 08/29/23 0847   Closure None 08/29/23 0847   Base non-blanchable;maroon/purple 08/29/23 0847   Drainage Amount none 08/29/23 0847   Care, Wound barrier applied 08/29/23 0847   Dressing Care open to air 08/29/23 0847       Wound 08/23/23 2313 Left gluteal (Active)   Dressing Appearance no drainage 08/28/23 2048   Closure None 08/29/23 0847   Base non-blanchable 08/29/23 0847   Periwound dry;non-blanchable 08/29/23 0847   Drainage Amount none 08/29/23 0847   Care, Wound barrier applied 08/29/23 0847     Physical Therapy Education       Title: PT OT SLP Therapies (Done)       Topic: Physical Therapy (Done)       Point: Mobility training (Done)        Learning Progress Summary             Patient Acceptance, E,TB, VU by RM at 8/29/2023 1600    Acceptance, TB, NR by DL at 8/28/2023 2048    Acceptance, E,TB, VU,DU by HC at 8/28/2023 1451    Acceptance, D,E, VU,NR by RG at 8/28/2023 1436    Acceptance, TB, NR by DL at 8/27/2023 2309    Acceptance, E,D, VU,NR by RG at 8/26/2023 1522    Acceptance, E,D, VU,NR by LL at 8/25/2023 1525    Acceptance, E,D, VU,NR by RG at 8/25/2023 1433    Acceptance, E, VU,NR by LB at 8/24/2023 1149                         Point: Home exercise program (Done)       Learning Progress Summary             Patient Acceptance, E,TB, VU by RM at 8/29/2023 1600    Acceptance, TB, NR by DL at 8/28/2023 2048    Acceptance, E,TB, VU,DU by HC at 8/28/2023 1451    Acceptance, D,E, VU,NR by RG at 8/28/2023 1436    Acceptance, TB, NR by DL at 8/27/2023 2309    Acceptance, E,D, VU,NR by RG at 8/26/2023 1522    Acceptance, E,D, VU,NR by LL at 8/25/2023 1525    Acceptance, E,D, VU,NR by RG at 8/25/2023 1433    Acceptance, E, VU,NR by LB at 8/24/2023 1149                         Point: Body mechanics (Done)       Learning Progress Summary             Patient Acceptance, E,TB, VU by RM at 8/29/2023 1600    Acceptance, TB, NR by DL at 8/28/2023 2048    Acceptance, E,TB, VU,DU by HC at 8/28/2023 1451    Acceptance, D,E, VU,NR by RG at 8/28/2023 1436    Acceptance, TB, NR by DL at 8/27/2023 2309    Acceptance, E,D, VU,NR by RG at 8/26/2023 1522    Acceptance, E,D, VU,NR by LL at 8/25/2023 1525    Acceptance, E,D, VU,NR by RG at 8/25/2023 1433    Acceptance, E, VU,NR by LB at 8/24/2023 1149                         Point: Precautions (Done)       Learning Progress Summary             Patient Acceptance, E,TB, VU by RM at 8/29/2023 1600    Acceptance, TB, NR by DL at 8/28/2023 2048    Acceptance, E,TB, VU,DU by HC at 8/28/2023 1451    Acceptance, D,E, VU,NR by RG at 8/28/2023 1436    Acceptance, TB, NR by DL at 8/27/2023 2309    Acceptance, E,D, VU,NR by RG at  8/26/2023 1522    Acceptance, E,D, VU,NR by LL at 8/25/2023 1525    Acceptance, E,D, VU,NR by RG at 8/25/2023 1433    Acceptance, E, VU,NR by LB at 8/24/2023 1149                                         User Key       Initials Effective Dates Name Provider Type Discipline    LB 06/16/21 -  Christel Ferguson, PT Physical Therapist PT    LL 05/02/16 -  Carly Lundberg, PTA Physical Therapist Assistant PT    RM 02/17/23 -  Patrizia Hope, PTA Physical Therapist Assistant PT    RG 06/16/21 -  Rajiv Pagan, PTA Physical Therapist Assistant PT    HC 01/20/23 -  Anaid Bo, PTA Physical Therapist Assistant PT    DL 03/16/22 -  Claudine Lundberg, RN Registered Nurse Nurse                    PT Recommendation and Plan    Frequency of Treatment (PT): 5 times per week  Anticipated Equipment Needs at Discharge (PT Eval):  (tbd)  Daily Progress Summary (PT)  Functional Goal Overall Progress (PT): progressing toward functional goals as expected  Daily Progress Summary (PT): Assistance required for functional mobility and ambulation this date; LE weakness also noted bilaterally. Continued skilled care required for further improvements to ensure maximum safe function upon D/C.  Impairments Still Limiting Function (PT): functional activity tolerance impairment, pain, strength deficit  Recommendations (PT): Continue per current POC               Time Calculation:      PT Charges       Row Name 08/29/23 1600             Time Calculation    Start Time 0915  -RM      Stop Time 1045  -RM      Time Calculation (min) 90 min  -RM      PT Received On 08/29/23  -RM      PT - Next Appointment 08/30/23  -RM      PT Goal Re-Cert Due Date 08/31/23  -RM         Time Calculation- PT    Total Timed Code Minutes- PT 90 minute(s)  -RM                User Key  (r) = Recorded By, (t) = Taken By, (c) = Cosigned By      Initials Name Provider Type    RM Patrizia Hope, SHITAL Physical Therapist Assistant                     Therapy Charges for Today       Code Description Service Date Service Provider Modifiers Qty    85133796450 HC GAIT TRAINING EA 15 MIN 8/29/2023 Patrizia Hope, PTA GP, CQ 2    22382727425 HC PT NEUROMUSC RE EDUCATION EA 15 MIN 8/29/2023 Patrizia Hope, PTA GP, CQ 1    42307991730 HC PT THER PROC EA 15 MIN 8/29/2023 Patrizia Hope, PTA GP, CQ 2    07581853214 HC PT THERAPEUTIC ACT EA 15 MIN 8/29/2023 Patrizia Hope, PTA GP, CQ 1              PT G-Codes  AM-PAC 6 Clicks Score (PT): 17      Patrizia Hope PTA  8/29/2023

## 2023-08-29 NOTE — PROGRESS NOTES
Patient was seen in OT, no new complaints, I did discuss with Dr. Arroyo this morning, he is considering adjusting the dialysate to pull more fluid for her hyponatremia, corrected sodium 130 hyperglycemia.  Vital signs are good.  Bowels have moved today.  Patient states she has been on Synthroid for years, she does not necessarily take this regularly at home.  The importance of this discussed

## 2023-08-29 NOTE — PROGRESS NOTES
Rehabilitation Nursing  Inpatient Rehabilitation Plan of Care Note    Plan of Care  Copy from POCSafety    Potential for Injury (Active)  Current Status (8/23/2023 7:00:00 PM): NO FALLS  Weekly Goal: NO FALLS  Discharge Goal: NO FALLS    Body Systems    Integumentary (Active)  Current Status (8/23/2023 7:00:00 PM): NO FURTHER BREAKDOWN  Weekly Goal: NO FURTHER BREAKDOWN  Discharge Goal: NO FURTHER BREAKDOWN    Signed by: Claudine Lundberg RN

## 2023-08-29 NOTE — PLAN OF CARE
Goal Outcome Evaluation:       Problem: Rehabilitation (IRF) Plan of Care  Goal: Plan of Care Review  Outcome: Ongoing, Progressing  Flowsheets  Taken 8/28/2023 2048 by Claudine Lundberg, RN  Plan of Care Reviewed With: patient  Taken 8/27/2023 1415 by Vicky Phillips, RN  Progress: improving  Goal: Patient-Specific Goal (Individualized)  Outcome: Ongoing, Progressing  Goal: Absence of New-Onset Illness or Injury  Outcome: Ongoing, Progressing  Goal: Optimal Comfort and Wellbeing  Outcome: Ongoing, Progressing  Goal: Home and Community Transition Plan Established  Outcome: Ongoing, Progressing

## 2023-08-30 LAB
GLUCOSE BLDC GLUCOMTR-MCNC: 142 MG/DL (ref 70–130)
GLUCOSE BLDC GLUCOMTR-MCNC: 157 MG/DL (ref 70–130)
GLUCOSE BLDC GLUCOMTR-MCNC: 254 MG/DL (ref 70–130)

## 2023-08-30 PROCEDURE — 97530 THERAPEUTIC ACTIVITIES: CPT

## 2023-08-30 PROCEDURE — 97110 THERAPEUTIC EXERCISES: CPT

## 2023-08-30 PROCEDURE — 97116 GAIT TRAINING THERAPY: CPT

## 2023-08-30 PROCEDURE — 97110 THERAPEUTIC EXERCISES: CPT | Performed by: OCCUPATIONAL THERAPIST

## 2023-08-30 PROCEDURE — 63710000001 INSULIN LISPRO (HUMAN) PER 5 UNITS: Performed by: INTERNAL MEDICINE

## 2023-08-30 PROCEDURE — 63710000001 INSULIN GLARGINE PER 5 UNITS: Performed by: INTERNAL MEDICINE

## 2023-08-30 PROCEDURE — 97535 SELF CARE MNGMENT TRAINING: CPT | Performed by: OCCUPATIONAL THERAPIST

## 2023-08-30 PROCEDURE — 82948 REAGENT STRIP/BLOOD GLUCOSE: CPT

## 2023-08-30 PROCEDURE — 97530 THERAPEUTIC ACTIVITIES: CPT | Performed by: OCCUPATIONAL THERAPIST

## 2023-08-30 PROCEDURE — 99232 SBSQ HOSP IP/OBS MODERATE 35: CPT | Performed by: INTERNAL MEDICINE

## 2023-08-30 PROCEDURE — 94799 UNLISTED PULMONARY SVC/PX: CPT

## 2023-08-30 PROCEDURE — 25010000002 HEPARIN (PORCINE) PER 1000 UNITS: Performed by: FAMILY MEDICINE

## 2023-08-30 RX ADMIN — TRAZODONE HYDROCHLORIDE 50 MG: 50 TABLET ORAL at 21:20

## 2023-08-30 RX ADMIN — ROPINIROLE HYDROCHLORIDE 0.5 MG: 0.25 TABLET, FILM COATED ORAL at 21:20

## 2023-08-30 RX ADMIN — ROPINIROLE HYDROCHLORIDE 0.5 MG: 0.25 TABLET, FILM COATED ORAL at 08:40

## 2023-08-30 RX ADMIN — HYDROCODONE BITARTRATE AND ACETAMINOPHEN 1 TABLET: 10; 325 TABLET ORAL at 08:39

## 2023-08-30 RX ADMIN — PANTOPRAZOLE SODIUM 40 MG: 40 TABLET, DELAYED RELEASE ORAL at 08:40

## 2023-08-30 RX ADMIN — HYDROXYZINE HYDROCHLORIDE 12.5 MG: 25 TABLET, FILM COATED ORAL at 21:30

## 2023-08-30 RX ADMIN — HEPARIN SODIUM 5000 UNITS: 5000 INJECTION INTRAVENOUS; SUBCUTANEOUS at 21:20

## 2023-08-30 RX ADMIN — FLUTICASONE PROPIONATE 2 SPRAY: 50 SPRAY, METERED NASAL at 08:41

## 2023-08-30 RX ADMIN — INSULIN LISPRO 8 UNITS: 100 INJECTION, SOLUTION INTRAVENOUS; SUBCUTANEOUS at 17:23

## 2023-08-30 RX ADMIN — FLUOXETINE HYDROCHLORIDE 40 MG: 20 CAPSULE ORAL at 08:39

## 2023-08-30 RX ADMIN — INSULIN GLARGINE 12 UNITS: 100 INJECTION, SOLUTION SUBCUTANEOUS at 08:38

## 2023-08-30 RX ADMIN — METOPROLOL SUCCINATE 25 MG: 25 TABLET, EXTENDED RELEASE ORAL at 08:40

## 2023-08-30 RX ADMIN — LEVOTHYROXINE SODIUM 50 MCG: 50 TABLET ORAL at 08:40

## 2023-08-30 RX ADMIN — CASTOR OIL AND BALSAM, PERU 1 APPLICATION: 788; 87 OINTMENT TOPICAL at 21:20

## 2023-08-30 RX ADMIN — AMLODIPINE BESYLATE 10 MG: 10 TABLET ORAL at 08:39

## 2023-08-30 RX ADMIN — TIZANIDINE 4 MG: 4 TABLET ORAL at 21:30

## 2023-08-30 RX ADMIN — ASPIRIN 81 MG: 81 TABLET, COATED ORAL at 08:39

## 2023-08-30 RX ADMIN — CASTOR OIL AND BALSAM, PERU 1 APPLICATION: 788; 87 OINTMENT TOPICAL at 08:41

## 2023-08-30 RX ADMIN — ATORVASTATIN CALCIUM 80 MG: 40 TABLET, FILM COATED ORAL at 21:20

## 2023-08-30 RX ADMIN — INSULIN LISPRO 3 UNITS: 100 INJECTION, SOLUTION INTRAVENOUS; SUBCUTANEOUS at 08:38

## 2023-08-30 RX ADMIN — Medication 1 TABLET: at 08:40

## 2023-08-30 RX ADMIN — ISOSORBIDE MONONITRATE 30 MG: 30 TABLET, EXTENDED RELEASE ORAL at 08:40

## 2023-08-30 RX ADMIN — HEPARIN SODIUM 5000 UNITS: 5000 INJECTION INTRAVENOUS; SUBCUTANEOUS at 08:39

## 2023-08-30 RX ADMIN — HYDROCODONE BITARTRATE AND ACETAMINOPHEN 1 TABLET: 10; 325 TABLET ORAL at 21:23

## 2023-08-30 RX ADMIN — INSULIN GLARGINE 15 UNITS: 100 INJECTION, SOLUTION SUBCUTANEOUS at 21:22

## 2023-08-30 RX ADMIN — INSULIN LISPRO 8 UNITS: 100 INJECTION, SOLUTION INTRAVENOUS; SUBCUTANEOUS at 12:26

## 2023-08-30 NOTE — THERAPY TREATMENT NOTE
Inpatient Rehabilitation - Physical Therapy Treatment Note       EDWARD Veliz     Patient Name: Reny Elena  : 1960  MRN: [id number]    Today's Date: 2023                    Admit Date: 2023      Visit Dx:   No diagnosis found.    Patient Active Problem List   Diagnosis    Tibia/fibula fracture    Iron deficiency anemia    Type 2 diabetes mellitus with hyperglycemia, with long-term current use of insulin    Wound of right ankle    Cellulitis    Metabolic encephalopathy    History of non-ST elevation myocardial infarction (NSTEMI)    HTN (hypertension)    CVA (cerebral vascular accident)    Chronic diastolic CHF (congestive heart failure)    Decubitus ulcer of coccyx, unspecified pressure ulcer stage    Depression    Expressive aphasia    Impaired mobility and ADLs    Hyperkalemia    Subdural hematoma    Severe malnutrition    Cerebrovascular accident (CVA), unspecified mechanism    PRES (posterior reversible encephalopathy syndrome)    Debility       Past Medical History:   Diagnosis Date    Arthritis     Closed fracture of right fibula and tibia 2018    Depression     Diabetic ulcer of left foot associated with type 2 diabetes mellitus 2017    Diastolic dysfunction     Disease of thyroid gland     Elevated cholesterol     End stage renal disease     Essential hypertension     GERD (gastroesophageal reflux disease)     History of transfusion     Hyperlipidemia     Iron deficiency anemia 2018    PAD (peripheral artery disease)     Renal failure     Type 2 diabetes mellitus with hyperglycemia, with long-term current use of insulin 2018       Past Surgical History:   Procedure Laterality Date    ABDOMINAL SURGERY      BACK SURGERY      Post spinal diskectomy, osteophytectomy in one lumbar interspace    CATARACT EXTRACTION      Left      SECTION      DENTAL PROCEDURE      ENDOSCOPY      FRACTURE SURGERY      right leg    HYSTERECTOMY      INCISION AND DRAINAGE LEG Left  4/15/2017    Procedure: INCISION AND DRAINAGE LOWER EXTREMITY;  Surgeon: Basilio Morris MD;  Location: [facility] OR;  Service:     INCISION AND DRAINAGE LEG Left 5/26/2017    Procedure: Irrigation and Debridment abcess diabetic wound left foot with deep culture;  Surgeon: Basilio Morris MD;  Location: [facility] OR;  Service:     INSERTION PERITONEAL DIALYSIS CATHETER N/A 12/16/2021    Procedure: INSERTION PERITONEAL DIALYSIS CATHETER LAPAROSCOPIC;  Surgeon: Edy Glover MD;  Location: [facility] OR;  Service: General;  Laterality: N/A;    KNEE ARTHROSCOPY Right     ORIF TIBIA FRACTURE Right 12/28/2018    Procedure: TIBIAL  FRACTURE OPEN REDUCTION INTERNAL FIXATION;  Surgeon: Jung Barragan MD;  Location: [facility] OR;  Service: Orthopedics    TOE AMPUTATION Right     5th    TUBAL ABDOMINAL LIGATION         PT ASSESSMENT (last 12 hours)       IRF PT Evaluation and Treatment       Row Name 08/30/23 1512          PT Time and Intention    Document Type daily treatment  -RM     Mode of Treatment individual therapy;physical therapy  -RM     Patient/Family/Caregiver Comments/Observations Pt reports BLE pain with weightbearing and TE this date.  -RM       Row Name 08/30/23 1512          General Information    Existing Precautions/Restrictions fall  peritoneal dialysis port at ABD, L patella subluxation  -RM       Row Name 08/30/23 1512          Pain Scale: FACES Pre/Post-Treatment    Pain: FACES Scale, Pretreatment 4-->hurts little more  -RM     Posttreatment Pain Rating 4-->hurts little more  -RM       Row Name 08/30/23 1512          Cognition/Psychosocial    Affect/Mental Status (Cognition) WFL  -RM     Orientation Status (Cognition) oriented x 3  -RM     Follows Commands (Cognition) verbal cues/prompting required;physical/tactile prompts required  -RM     Personal Safety Interventions gait belt;nonskid shoes/slippers when out of bed;fall prevention program maintained  -RM     Cognitive Function WFL  -RM       Row Name 08/30/23  1512          Bed Mobility    Supine-Sit [place] (Bed Mobility) contact guard  -RM     Sit-Supine [place] (Bed Mobility) minimum assist (75% patient effort)  -RM     Assistive Device (Bed Mobility) bed rails  -RM       Row Name 08/30/23 1512          Bed-Chair Transfer    Bed-Chair [place] (Transfers) minimum assist (75% patient effort);moderate assist (50% patient effort)  -RM     Assistive Device (Bed-Chair Transfers) wheelchair  -RM       Row Name 08/30/23 1512          Chair-Bed Transfer    Chair-Bed [place] (Transfers) minimum assist (75% patient effort);verbal cues;nonverbal cues (demo/gesture);moderate assist (50% patient effort)  -RM     Assistive Device (Chair-Bed Transfers) wheelchair  -RM       Row Name 08/30/23 1512          Sit-Stand Transfer    Sit-Stand [place] (Transfers) minimum assist (75% patient effort);verbal cues;nonverbal cues (demo/gesture)  -RM     Assistive Device (Sit-Stand Transfers) wheelchair;walker, front-wheeled  -RM       Row Name 08/30/23 1512          Stand-Sit Transfer    Stand-Sit [place] (Transfers) minimum assist (75% patient effort);verbal cues;nonverbal cues (demo/gesture);moderate assist (50% patient effort)  -RM     Assistive Device (Stand-Sit Transfers) wheelchair;walker, front-wheeled  -RM       Row Name 08/30/23 1512          Toilet Transfer    Type (Toilet Transfer) stand pivot/stand step  -     [place] Level (Toilet Transfer) minimum assist (75% patient effort);nonverbal cues (demo/gesture);verbal cues;moderate assist (50% patient effort)  -RM     Assistive Device (Toilet Transfer) wheelchair;grab bars/safety frame  -RM       Row Name 08/30/23 1512          Gait/Stairs (Locomotion)    Gait/Stairs Locomotion gait/ambulation independence;gait/ambulation assistive device;distance ambulated  -     [place] Level (Gait) minimum assist (75% patient effort);verbal cues;nonverbal cues (demo/gesture);moderate assist (50% patient  effort)  -RM     Assistive Device (Gait) walker, front-wheeled  -RM     Distance in Feet (Gait) 60, 20  -RM     Pattern (Gait) step-through  -RM     Deviations/Abnormal Patterns (Gait) casa decreased;gait speed decreased;stride length decreased  -RM     Bilateral Gait Deviations forward flexed posture;heel strike decreased  -RM     Comment, (Gait/Stairs) Knee buckling noted; rest required frequently for fatigue. PT requires assistance with RW management; significant forward flexed posture noted.  -RM       Row Name 08/30/23 1512          Safety Issues, Functional Mobility    Impairments Affecting Function (Mobility) balance;endurance/activity tolerance;pain;range of motion (ROM);strength  -RM       Row Name 08/30/23 1512          Hip (Therapeutic Exercise)    Hip Strengthening (Therapeutic Exercise) bilateral;flexion;extension;aBduction;aDduction;external rotation;internal rotation;heel slides;marching while seated;sitting;supine;1 lb free weight;resistance band;green;2 sets;10 repetitions  -RM       Row Name 08/30/23 1512          Knee (Therapeutic Exercise)    Knee Strengthening (Therapeutic Exercise) bilateral;flexion;extension;heel slides;marching while seated;SAQ (short arc quad);LAQ (long arc quad);hamstring curls;sitting;supine;1 lb free weight;resistance band;green;2 sets;10 repetitions;other (see comments)  Pt unable to achieve full ROM on RLE as patellqar dislocation is noted; OP provided to maintain proper alignment  -RM       Row Name 08/30/23 1512          Ankle (Therapeutic Exercise)    Ankle Strengthening (Therapeutic Exercise) bilateral;dorsiflexion;plantarflexion;sitting;supine;2 sets;10 repetitions  -RM       Row Name 08/30/23 1512          Positioning and Restraints    Pre-Treatment Position sitting in chair/recliner  bed in AM  -RM     In Bed supine;call light within reach;encouraged to call for assist  BID  -RM       Row Name 08/30/23 1512          Therapy Assessment/Plan (PT)    Patient's  Goals For Discharge return home  -RM       Row Name 08/30/23 1512          Therapy Assessment/Plan (PT)    Rehab Potential/Prognosis (PT) adequate, monitor progress closely  -RM     Frequency of Treatment (PT) 5 times per week  -RM     Estimated Duration of Therapy (PT) 2 weeks  -RM     Problem List (PT) balance;mobility;range of motion (ROM);strength;pain  -RM     Activity Limitations Related to Problem List (PT) unable to ambulate safely;unable to transfer safely  -RM       Row Name 08/30/23 1512          Daily Progress Summary (PT)    Functional Goal Overall Progress (PT) progressing toward functional goals as expected  -RM     Daily Progress Summary (PT) Increased assistance required for functional mobility and ambulation this date. Significant LE weakness persisits. Continued skilled crae required for further improvements to ensure maximum safe function upon D/C.  -RM     Impairments Still Limiting Function (PT) functional activity tolerance impairment;pain;strength deficit  -RM       Row Name 08/30/23 1512          Therapy Plan Review/Discharge Plan (PT)    Anticipated Equipment Needs at Discharge (PT Eval) --  tbd  -RM     Anticipated Discharge Disposition (PT) home with assist;home with home health;home with outpatient therapy services  -RM       Row Name 08/30/23 1512          IRF PT Goals    Bed Mobility Goal Selection (PT-IRF) bed mobility, PT goal 1  -RM     Transfer Goal Selection (PT-IRF) transfers, PT goal 1  -RM     Gait (Walking Locomotion) Goal Selection (PT-IRF) gait, PT goal 1  -RM     Stairs Goal Selection (PT-IRF) stairs, PT goal 1  -RM       Row Name 08/30/23 1512          Bed Mobility Goal 1 (PT-IRF)    Activity/Assistive Device (Bed Mobility Goal 1, PT-IRF) sit to supine/supine to sit  -RM     [place] Level (Bed Mobility Goal 1, PT-IRF) independent  -RM     Time Frame (Bed Mobility Goal 1, PT-IRF) by discharge  -RM       Row Name 08/30/23 1512          Transfer Goal 1 (PT-IRF)     Activity/Assistive Device (Transfer Goal 1, PT-IRF) sit-to-stand/stand-to-sit;bed-to-chair/chair-to-bed  -RM     [place] Level (Transfer Goal 1, PT-IRF) modified independence  -RM     Time Frame (Transfer Goal 1, PT-IRF) by discharge  -RM       Row Name 08/30/23 1512          Gait/Walking Locomotion Goal 1 (PT-IRF)    Activity/Assistive Device (Gait/Walking Locomotion Goal 1, PT-IRF) walker, rolling  -RM     Gait/Walking Locomotion Distance Goal 1 (PT-IRF) 300'  -RM     [place] Level (Gait/Walking Locomotion Goal 1, PT-IRF) supervision required  -RM     Time Frame (Gait/Walking Locomotion Goal 1, PT-IRF) by discharge  -RM       Row Name 08/30/23 1512          Stairs Goal 1 (PT-IRF)    Activity/Assistive Device (Stairs Goal 1, PT-IRF) ascending stairs;descending stairs;using handrail, left;using handrail, right  -RM     Number of Stairs (Stairs Goal 1, PT-IRF) 5  -RM     [place] Level (Stairs Goal 1, PT-IRF) supervision required  -RM     Time Frame (Stairs Goal 1, PT-IRF) by discharge  -RM               User Key  (r) = Recorded By, (t) = Taken By, (c) = Cosigned By      Initials Name Provider Type    RM Patrizia Hope, PTA Physical Therapist Assistant                  Wound 08/23/23 1923 Right posterior heel (Active)   Dressing Appearance open to air 08/30/23 0839   Closure None 08/30/23 0839   Base non-blanchable;maroon/purple 08/30/23 0839   Drainage Amount none 08/30/23 0839   Care, Wound barrier applied 08/30/23 0839   Dressing Care open to air 08/30/23 0839       Wound 08/23/23 2313 Left gluteal (Active)   Dressing Appearance no drainage 08/29/23 2104   Closure None 08/30/23 0839   Base non-blanchable 08/30/23 0839   Periwound dry;non-blanchable 08/30/23 0839   Drainage Amount none 08/30/23 0839   Care, Wound barrier applied 08/30/23 0839   Dressing Care open to air 08/30/23 0839     Physical Therapy Education       Title: PT OT SLP Therapies (In Progress)       Topic: Physical Therapy  (Done)       Point: Mobility training (Done)       Learning Progress Summary             Patient Acceptance, E,TB, VU by RM at 8/30/2023 1530    Acceptance, TB, NR by DL at 8/29/2023 2104    Acceptance, E,TB, VU by RM at 8/29/2023 1600    Acceptance, TB, NR by DL at 8/28/2023 2048    Acceptance, E,TB, VU,DU by HC at 8/28/2023 1451    Acceptance, D,E, VU,NR by RG at 8/28/2023 1436    Acceptance, TB, NR by DL at 8/27/2023 2309    Acceptance, E,D, VU,NR by RG at 8/26/2023 1522    Acceptance, E,D, VU,NR by LL at 8/25/2023 1525    Acceptance, E,D, VU,NR by RG at 8/25/2023 1433    Acceptance, E, VU,NR by LB at 8/24/2023 1149                         Point: Home exercise program (Done)       Learning Progress Summary             Patient Acceptance, E,TB, VU by RM at 8/30/2023 1530    Acceptance, TB, NR by DL at 8/29/2023 2104    Acceptance, E,TB, VU by RM at 8/29/2023 1600    Acceptance, TB, NR by DL at 8/28/2023 2048    Acceptance, E,TB, VU,DU by HC at 8/28/2023 1451    Acceptance, D,E, VU,NR by RG at 8/28/2023 1436    Acceptance, TB, NR by DL at 8/27/2023 2309    Acceptance, E,D, VU,NR by RG at 8/26/2023 1522    Acceptance, E,D, VU,NR by LL at 8/25/2023 1525    Acceptance, E,D, VU,NR by RG at 8/25/2023 1433    Acceptance, E, VU,NR by LB at 8/24/2023 1149                         Point: Body mechanics (Done)       Learning Progress Summary             Patient Acceptance, E,TB, VU by RM at 8/30/2023 1530    Acceptance, TB, NR by DL at 8/29/2023 2104    Acceptance, E,TB, VU by RM at 8/29/2023 1600    Acceptance, TB, NR by DL at 8/28/2023 2048    Acceptance, E,TB, VU,DU by HC at 8/28/2023 1451    Acceptance, D,E, VU,NR by RG at 8/28/2023 1436    Acceptance, TB, NR by DL at 8/27/2023 2309    Acceptance, E,D, VU,NR by RG at 8/26/2023 1522    Acceptance, E,D, VU,NR by LL at 8/25/2023 1525    Acceptance, E,D, VU,NR by RG at 8/25/2023 1433    Acceptance, E, VU,NR by LB at 8/24/2023 1149                         Point: Precautions  (Done)       Learning Progress Summary             Patient Acceptance, E,TB, VU by RM at 8/30/2023 1530    Acceptance, TB, NR by DL at 8/29/2023 2104    Acceptance, E,TB, VU by RM at 8/29/2023 1600    Acceptance, TB, NR by DL at 8/28/2023 2048    Acceptance, E,TB, VU,DU by HC at 8/28/2023 1451    Acceptance, D,E, VU,NR by RG at 8/28/2023 1436    Acceptance, TB, NR by DL at 8/27/2023 2309    Acceptance, E,D, VU,NR by RG at 8/26/2023 1522    Acceptance, E,D, VU,NR by LL at 8/25/2023 1525    Acceptance, E,D, VU,NR by RG at 8/25/2023 1433    Acceptance, E, VU,NR by LB at 8/24/2023 1149                                         User Key       Initials Effective Dates Name Provider Type Discipline    LB 06/16/21 -  Christel Ferguson, PT Physical Therapist PT    LL 05/02/16 -  aCrly Lundberg, PTA Physical Therapist Assistant PT    RM 02/17/23 -  Patrizia Hope, PTA Physical Therapist Assistant PT    RG 06/16/21 -  Rajiv Pagan, PTA Physical Therapist Assistant PT    HC 01/20/23 -  Anaid Bo, PTA Physical Therapist Assistant PT    DL 03/16/22 -  Claudine Lundberg, RN Registered Nurse Nurse                    PT Recommendation and Plan    Frequency of Treatment (PT): 5 times per week  Anticipated Equipment Needs at Discharge (PT Eval):  (tbd)  Daily Progress Summary (PT)  Functional Goal Overall Progress (PT): progressing toward functional goals as expected  Daily Progress Summary (PT): Increased assistance required for functional mobility and ambulation this date. Significant LE weakness persisits. Continued skilled crae required for further improvements to ensure maximum safe function upon D/C.  Impairments Still Limiting Function (PT): functional activity tolerance impairment, pain, strength deficit  Recommendations (PT): Continue per current POC               Time Calculation:      PT Charges       Row Name 08/30/23 1541 08/30/23 1531          Time Calculation    Start Time 1330  -RM 1045  -RM      Stop Time 1415  -RM 1130  -RM     Time Calculation (min) 45 min  -RM 45 min  -RM     PT Received On 08/30/23  -RM 08/30/23  -RM     PT - Next Appointment 08/31/23  -RM 08/30/23  -RM     PT Goal Re-Cert Due Date 08/31/23  -RM 08/31/23  -RM        Time Calculation- PT    Total Timed Code Minutes- PT 45 minute(s)  -RM 45 minute(s)  -RM               User Key  (r) = Recorded By, (t) = Taken By, (c) = Cosigned By      Initials Name Provider Type    RM Patrizia Hope PTA Physical Therapist Assistant                    Therapy Charges for Today       Code Description Service Date Service Provider Modifiers Qty    05741425463 HC GAIT TRAINING EA 15 MIN 8/29/2023 Patrizia Hope, PTA GP, CQ 2    84597727785 HC PT NEUROMUSC RE EDUCATION EA 15 MIN 8/29/2023 Patrizia Hope, PTA GP, CQ 1    36832782055 HC PT THER PROC EA 15 MIN 8/29/2023 Patrizia Hope, PTA GP, CQ 2    63617795801 HC PT THERAPEUTIC ACT EA 15 MIN 8/29/2023 Patrizia Hope, PTA GP, CQ 1    87458884615 HC GAIT TRAINING EA 15 MIN 8/30/2023 Patrizia Hope, PTA GP, CQ 1    93873325320 HC PT THER PROC EA 15 MIN 8/30/2023 Patrizia Hope, PTA GP, CQ 4    86755758985 HC PT THERAPEUTIC ACT EA 15 MIN 8/30/2023 Patrizia Hope, PTA GP, CQ 1              PT G-Codes  AM-PAC 6 Clicks Score (PT): 17      Patrizia Hope PTA  8/30/2023

## 2023-08-30 NOTE — PROGRESS NOTES
Nephrology Progress Note      Subjective   Doing well stated her  has been discharged to home.    Objective       Vital signs :     Temp:  [97.8 °F (36.6 °C)-98.8 °F (37.1 °C)] 98.8 °F (37.1 °C)  Heart Rate:  [65-72] 72  Resp:  [16-18] 16  BP: (102-148)/(63-78) 148/78    Intake/Output                         08/28/23 0701 - 08/29/23 0700 08/29/23 0701 - 08/30/23 0700     4927-1173 4714-9858 Total 0840-4354 9582-8874 Total                 Intake    P.O.  1080  120 1200  720  360 1080    Total Intake 4057 846 5960        Output    Dialysis  --  -- --  640  1154 1794    Total Output -- -- -- 640 1154 1794             Physical Exam:  General Appearance : alert, pleasant, appears stated age, cooperative and alert  Lungs : Decreased intensity of breath sounds  Heart :  regular rhythm & normal rate, normal S1, S2 and no murmur, no rub  Abdomen : soft, non distended  Extremities : 1+ edema  Neurologic :   orientated to person, place, time and situation, Grossly no focal deficits        Laboratory Data :     Albumin Albumin   Date Value Ref Range Status   08/29/2023 2.5 (L) 3.5 - 5.2 g/dL Final          Magnesium No results found for: MG           PTH               No results found for: PTH    CBC and coagulation:  Results from last 7 days   Lab Units 08/29/23 0327 08/28/23  0230 08/26/23  0020   WBC 10*3/mm3 4.58 4.97 5.54   HEMOGLOBIN g/dL 8.2* 7.8* 8.3*   HEMATOCRIT % 25.6* 25.3* 26.4*   MCV fL 93.8 96.6 97.8*   MCHC g/dL 32.0 30.8* 31.4*   PLATELETS 10*3/mm3 151 128* 155     Acid/base balance:      Renal and electrolytes:    Results from last 7 days   Lab Units 08/29/23  0327 08/28/23  0230 08/26/23  0020 08/24/23  0100   SODIUM mmol/L 127* 127* 133* 133*   POTASSIUM mmol/L 4.7 4.6 4.1 4.1   MAGNESIUM mg/dL  --   --   --  2.0   CHLORIDE mmol/L 92* 92* 98 96*   CO2 mmol/L 23.3 23.0 23.3 24.5   BUN mg/dL 86* 77* 61* 45*   CREATININE mg/dL 4.85* 4.78* 4.69* 4.78*   CALCIUM mg/dL 7.7* 7.6* 7.4* 7.4*      Estimated Creatinine Clearance: 10.3 mL/min (A) (by C-G formula based on SCr of 4.85 mg/dL (H)).  @GFRCG:3@   Liver and pancreatic function:  Results from last 7 days   Lab Units 08/29/23  0327 08/24/23  0100   ALBUMIN g/dL 2.5* 2.7*   BILIRUBIN mg/dL 0.2 <0.2   ALK PHOS U/L 101 106   AST (SGOT) U/L 26 22   ALT (SGPT) U/L 23 14         Cardiac:      Liver and pancreatic function:  Results from last 7 days   Lab Units 08/29/23  0327 08/24/23  0100   ALBUMIN g/dL 2.5* 2.7*   BILIRUBIN mg/dL 0.2 <0.2   ALK PHOS U/L 101 106   AST (SGOT) U/L 26 22   ALT (SGPT) U/L 23 14       Medications :     amLODIPine, 10 mg, Oral, Daily  aspirin, 81 mg, Oral, Daily  atorvastatin, 80 mg, Oral, Nightly  castor oil-balsam peru, 1 application , Topical, Q12H  FLUoxetine, 40 mg, Oral, Daily  fluticasone, 2 spray, Each Nare, Daily  heparin (porcine), 5,000 Units, Subcutaneous, Q12H  insulin glargine, 12 Units, Subcutaneous, Q12H  insulin lispro, 3-14 Units, Subcutaneous, TID With Meals  isosorbide mononitrate, 30 mg, Oral, Daily  levothyroxine, 50 mcg, Oral, Daily  metoprolol succinate XL, 25 mg, Oral, Daily  multivitamin, 1 tablet, Oral, Daily  pantoprazole, 40 mg, Oral, Daily  rOPINIRole, 0.5 mg, Oral, BID  traZODone, 50 mg, Oral, Nightly             Assessment & Plan     1.  End-stage renal disease on peritoneal dialysis  2.  Type 2 diabetes with nephropathy  3.  Chronic diarrhea now worse  4.  New onset hypothyroidism  5.  Hyponatremia, multifactorial including hypovolemic  6.  Hypertension  7.  Malnutrition  8.  Diabetic neuropathy and myopathy with inability to ambulate independently  9.  Anemia of CKD    Continue on dialysis using 1.5% and 2.5% mix today as well   Patient's  has returned home and she would like to continue her dialysis nightly PD after returning to home      Plan of care was discussed with the patient, understood verbalized agreed        Nicholas Arroyo MD  08/30/23  09:10 EDT

## 2023-08-30 NOTE — THERAPY TREATMENT NOTE
Inpatient Rehabilitation - Occupational Therapy Progress Note and Treatment Note    EDWARD Veliz     Patient Name: Reny Elena  : 1960  MRN: [id number]    Today's Date: 2023                 Admit Date: 2023       No diagnosis found.    Patient Active Problem List   Diagnosis    Tibia/fibula fracture    Iron deficiency anemia    Type 2 diabetes mellitus with hyperglycemia, with long-term current use of insulin    Wound of right ankle    Cellulitis    Metabolic encephalopathy    History of non-ST elevation myocardial infarction (NSTEMI)    HTN (hypertension)    CVA (cerebral vascular accident)    Chronic diastolic CHF (congestive heart failure)    Decubitus ulcer of coccyx, unspecified pressure ulcer stage    Depression    Expressive aphasia    Impaired mobility and ADLs    Hyperkalemia    Subdural hematoma    Severe malnutrition    Cerebrovascular accident (CVA), unspecified mechanism    PRES (posterior reversible encephalopathy syndrome)    Debility       Past Medical History:   Diagnosis Date    Arthritis     Closed fracture of right fibula and tibia 2018    Depression     Diabetic ulcer of left foot associated with type 2 diabetes mellitus 2017    Diastolic dysfunction     Disease of thyroid gland     Elevated cholesterol     End stage renal disease     Essential hypertension     GERD (gastroesophageal reflux disease)     History of transfusion     Hyperlipidemia     Iron deficiency anemia 2018    PAD (peripheral artery disease)     Renal failure     Type 2 diabetes mellitus with hyperglycemia, with long-term current use of insulin 2018       Past Surgical History:   Procedure Laterality Date    ABDOMINAL SURGERY      BACK SURGERY      Post spinal diskectomy, osteophytectomy in one lumbar interspace    CATARACT EXTRACTION      Left      SECTION      DENTAL PROCEDURE      ENDOSCOPY      FRACTURE SURGERY      right leg    HYSTERECTOMY      INCISION AND DRAINAGE LEG  Left 4/15/2017    Procedure: INCISION AND DRAINAGE LOWER EXTREMITY;  Surgeon: Basilio Morris MD;  Location:  [facility] OR;  Service:     INCISION AND DRAINAGE LEG Left 5/26/2017    Procedure: Irrigation and Debridment abcess diabetic wound left foot with deep culture;  Surgeon: Basilio Morris MD;  Location: [facility];  Service:     INSERTION PERITONEAL DIALYSIS CATHETER N/A 12/16/2021    Procedure: INSERTION PERITONEAL DIALYSIS CATHETER LAPAROSCOPIC;  Surgeon: Edy Glover MD;  Location: [facility] OR;  Service: General;  Laterality: N/A;    KNEE ARTHROSCOPY Right     ORIF TIBIA FRACTURE Right 12/28/2018    Procedure: TIBIAL  FRACTURE OPEN REDUCTION INTERNAL FIXATION;  Surgeon: Jung Barragan MD;  Location: [facility] OR;  Service: Orthopedics    TOE AMPUTATION Right     5th    TUBAL ABDOMINAL LIGATION               IRF OT ASSESSMENT FLOWSHEET (last 12 hours)       IRF OT Evaluation and Treatment       Row Name 08/30/23 1235          OT Time and Intention    Document Type daily treatment;progress note  -BF     Mode of Treatment occupational therapy  -BF     Patient Effort good  -BF     Symptoms Noted During/After Treatment fatigue  -BF       Row Name 08/30/23 1235          General Information    Existing Precautions/Restrictions fall  peritoneal dialysis port at ABD, L patella subluxation  -BF       Row Name 08/30/23 1235          Cognition/Psychosocial    Orientation Status (Cognition) oriented x 3  -BF     Follows Commands (Cognition) follows one-step commands;verbal cues/prompting required  -BF       Row Name 08/30/23 1235          Bathing    [place] Level (Bathing) minimum assist (75% patient effort);verbal cues;nonverbal cues (demo/gesture)  -BF     Position (Bathing) edge of bed sitting  -BF     Comment (Bathing) Min A  -BF       Row Name 08/30/23 1235          Upper Body Dressing    [place] Level (Upper Body Dressing) set up assistance;supervision;verbal cues  -BF     Position (Upper Body Dressing) edge  of bed sitting  -BF     Comment (Upper Body Dressing) Set-up/supervision  -BF       Row Name 08/30/23 1235          Lower Body Dressing    [place] Level (Lower Body Dressing) minimum assist (75% patient effort);verbal cues;nonverbal cues (demo/gesture)  -BF     Position (Lower Body Dressing) edge of bed sitting  -BF     Comment (Lower Body Dressing) Min A  -BF       Row Name 08/30/23 1235          Grooming    [place] Level (Grooming) set up;supervision  -BF     Position (Grooming) edge of bed sitting  -BF     Comment (Grooming) Set-up/supervision  -BF       Row Name 08/30/23 1235          Toileting    Comment (Toileting) Mod A  -BF       Row Name 08/30/23 1235          Self-Feeding    Comment (Self-Feeding) Set-up  -BF       Row Name 08/30/23 1235          Bed-Chair Transfer    Bed-Chair [place] (Transfers) minimum assist (75% patient effort);verbal cues  -BF     Assistive Device (Bed-Chair Transfers) wheelchair  -BF       Row Name 08/30/23 1235          Motor Skills    Motor Control/Coordination Interventions fine motor manipulation/dexterity activities;gross motor coordination activities;therapeutic exercise/ROM  BUE GMC/FMC theract, strengthening; BUE flexbar, PRE 3 mins X3, rickshaw 12 lbs X10X4  -BF       Row Name 08/30/23 1235          Positioning and Restraints    In Wheelchair sitting;with PT  -BF               User Key  (r) = Recorded By, (t) = Taken By, (c) = Cosigned By      Initials Name Effective Dates    Yael Cortes OT 07/11/23 -                      Occupational Therapy Education       Title: PT OT SLP Therapies (In Progress)       Topic: Occupational Therapy (In Progress)       Point: ADL training (In Progress)       Description:   Instruct learner(s) on proper safety adaptation and remediation techniques during self care or transfers.   Instruct in proper use of assistive devices.                  Learning Progress Summary             Patient Acceptance, E, NR by BF at  8/30/2023 1235    Acceptance, TB, NR by DL at 8/29/2023 2104    Acceptance, E, VU,NR by BF at 8/29/2023 1447    Acceptance, TB, NR by DL at 8/28/2023 2048    Acceptance, E,D, NR,VU by CJ at 8/28/2023 1505    Acceptance, TB, NR by DL at 8/27/2023 2309    Acceptance, E, VU,NR by HB at 8/26/2023 1417    Acceptance, E, VU,NR by BF at 8/25/2023 1438    Acceptance, E, VU,NR by BF at 8/24/2023 1450                         Point: Precautions (In Progress)       Description:   Instruct learner(s) on prescribed precautions during self-care and functional transfers.                  Learning Progress Summary             Patient Acceptance, E, NR by BF at 8/30/2023 1235    Acceptance, TB, NR by DL at 8/29/2023 2104    Acceptance, E, VU,NR by BF at 8/29/2023 1447    Acceptance, TB, NR by DL at 8/28/2023 2048    Acceptance, E,D, NR,VU by CJ at 8/28/2023 1505    Acceptance, TB, NR by DL at 8/27/2023 2309    Acceptance, E, VU,NR by HB at 8/26/2023 1417    Acceptance, E, VU,NR by BF at 8/25/2023 1438    Acceptance, E, VU,NR by BF at 8/24/2023 1450                                         User Key       Initials Effective Dates Name Provider Type Discipline    BF 07/11/23 -  Yael Mendez OT Occupational Therapist OT    CJ 06/16/21 -  Nichelle Lagunas OT Occupational Therapist OT    HB 05/25/21 -  Ella Wallace OT Occupational Therapist OT    DL 03/16/22 -  Claudine Lundberg, RN Registered Nurse Nurse                        OT Recommendation and Plan    Planned Therapy Interventions (OT): activity tolerance training, adaptive equipment training, BADL retraining, ROM/therapeutic exercise, strengthening exercise, transfer/mobility retraining                    Time Calculation:      Time Calculation- OT       Row Name 08/30/23 1239             Time Calculation- OT    OT Start Time 0915  -BF      OT Stop Time 1045  -BF      OT Time Calculation (min) 90 min  -BF      Total Timed Code Minutes- OT 90 minute(s)  -BF      OT  Non-Billable Time (min) 10 min  -BF                User Key  (r) = Recorded By, (t) = Taken By, (c) = Cosigned By      Initials Name Provider Type    Yael Cortes OT Occupational Therapist                  Therapy Charges for Today       Code Description Service Date Service Provider Modifiers Qty    69137152152 HC OT SELF CARE/MGMT/TRAIN EA 15 MIN 8/29/2023 Yael Mendez OT GO 2    81836071180 HC OT THERAPEUTIC ACT EA 15 MIN 8/29/2023 Yael Mendez OT GO 3    92936153953 HC OT THER PROC EA 15 MIN 8/29/2023 Yael Mendez OT GO 1    51724123555 HC OT SELF CARE/MGMT/TRAIN EA 15 MIN 8/30/2023 Yael Mendez OT GO 3    35209349288 HC OT THER PROC EA 15 MIN 8/30/2023 Yael Mendez, OT GO 2    31080226351 HC OT THERAPEUTIC ACT EA 15 MIN 8/30/2023 Yael Mendez OT GO 1                     Yael Mendez OT  8/30/2023

## 2023-08-30 NOTE — PROGRESS NOTES
CC: Follow-up on debility    Interview History/HPI: Patient thinks dialysis went well through the night.  I noticed that she has had some difficulty eating her sausage, she is edentulous, discussed getting her chopped diet which she is in agreement with.  I did not see any dysphagia with this.  Otherwise no new complaints.  States she is breathing well, no chest pain.          Current Hospital Meds:  amLODIPine, 10 mg, Oral, Daily  aspirin, 81 mg, Oral, Daily  atorvastatin, 80 mg, Oral, Nightly  castor oil-balsam peru, 1 application , Topical, Q12H  FLUoxetine, 40 mg, Oral, Daily  fluticasone, 2 spray, Each Nare, Daily  heparin (porcine), 5,000 Units, Subcutaneous, Q12H  insulin glargine, 12 Units, Subcutaneous, Q12H  insulin lispro, 3-14 Units, Subcutaneous, TID With Meals  isosorbide mononitrate, 30 mg, Oral, Daily  levothyroxine, 50 mcg, Oral, Daily  metoprolol succinate XL, 25 mg, Oral, Daily  multivitamin, 1 tablet, Oral, Daily  pantoprazole, 40 mg, Oral, Daily  rOPINIRole, 0.5 mg, Oral, BID  traZODone, 50 mg, Oral, Nightly         Vitals:    08/30/23 0805   BP:    Pulse:    Resp:    Temp:    SpO2: 92%         Intake/Output Summary (Last 24 hours) at 8/30/2023 1138  Last data filed at 8/30/2023 0900  Gross per 24 hour   Intake 1200 ml   Output 1154 ml   Net 46 ml       EXAM: 98.8, 72, 16, 148/78, prior to this 102/65.  Mood is good skin warm and dry she had some mild difficulty with fine motor movements trying to scoop her eggs onto a fork.  Otherwise strength was symmetric, edentulous, lungs clear, heart regular rate and rhythm questionable 1/6 DOMENIC heard.  Abdomen soft, benign, no edema is noted.      Diet: Regular/House Diet, Diabetic Diets, Fluid Restriction (240 mL/tray) Diets; Consistent Carbohydrate; Other (Specify mL/day) (800 cc restriction daily); Texture: Soft to Chew (NDD 3); Soft to Chew: Chopped Meat; Fluid Consistency: Thin (IDDSI ...        LABS:     Lab Results (last 48 hours)        Procedure Component Value Units Date/Time    POC Glucose Once [[id number]]  (Abnormal) Collected: 08/30/23 1109    Specimen: Blood Updated: 08/30/23 1115     Glucose 254 mg/dL     POC Glucose Once [[id number]]  (Abnormal) Collected: 08/30/23 0616    Specimen: Blood Updated: 08/30/23 0623     Glucose 157 mg/dL     POC Glucose Once [[id number]]  (Abnormal) Collected: 08/29/23 2018    Specimen: Blood Updated: 08/29/23 2026     Glucose 208 mg/dL     POC Glucose Once [[id number]]  (Abnormal) Collected: 08/29/23 1603    Specimen: Blood Updated: 08/29/23 1610     Glucose 226 mg/dL     POC Glucose Once [[id number]]  (Abnormal) Collected: 08/29/23 1037    Specimen: Blood Updated: 08/29/23 1044     Glucose 189 mg/dL     POC Glucose Once [[id number]]  (Abnormal) Collected: 08/29/23 0649    Specimen: Blood Updated: 08/29/23 0656     Glucose 242 mg/dL     TSH [[id number]]  (Abnormal) Collected: 08/29/23 0327    Specimen: Blood Updated: 08/29/23 0439     .500 uIU/mL     T4, Free [[id number]]  (Abnormal) Collected: 08/29/23 0327    Specimen: Blood Updated: 08/29/23 0420     Free T4 0.32 ng/dL     Narrative:      Results may be falsely increased if patient taking Biotin.      Comprehensive Metabolic Panel [[id number]]  (Abnormal) Collected: 08/29/23 0327    Specimen: Blood Updated: 08/29/23 0415     Glucose 292 mg/dL      BUN 86 mg/dL      Creatinine 4.85 mg/dL      Sodium 127 mmol/L      Potassium 4.7 mmol/L      Chloride 92 mmol/L      CO2 23.3 mmol/L      Calcium 7.7 mg/dL      Total Protein 4.9 g/dL      Albumin 2.5 g/dL      ALT (SGPT) 23 U/L      AST (SGOT) 26 U/L      Alkaline Phosphatase 101 U/L      Total Bilirubin 0.2 mg/dL      Globulin 2.4 gm/dL      A/G Ratio 1.0 g/dL      BUN/Creatinine Ratio 17.7     Anion Gap 11.7 mmol/L      eGFR 9.4 mL/min/1.73      Comment: <15 Indicative of kidney failure       Narrative:      GFR Normal >60  Chronic Kidney Disease <60  Kidney Failure <15      CBC & Differential [[id number]]   (Abnormal) Collected: 08/29/23 0327    Specimen: Blood Updated: 08/29/23 0351    Narrative:      The following orders were created for panel order CBC & Differential.  Procedure                               Abnormality         Status                     ---------                               -----------         ------                     CBC Auto Differential[[id number]]        Abnormal            Final result                 Please view results for these tests on the individual orders.    CBC Auto Differential [[id number]]  (Abnormal) Collected: 08/29/23 0327    Specimen: Blood Updated: 08/29/23 0351     WBC 4.58 10*3/mm3      RBC 2.73 10*6/mm3      Hemoglobin 8.2 g/dL      Hematocrit 25.6 %      MCV 93.8 fL      MCH 30.0 pg      MCHC 32.0 g/dL      RDW 13.7 %      RDW-SD 46.6 fl      MPV 10.1 fL      Platelets 151 10*3/mm3      Neutrophil % 62.1 %      Lymphocyte % 23.1 %      Monocyte % 8.3 %      Eosinophil % 5.2 %      Basophil % 0.9 %      Immature Grans % 0.4 %      Neutrophils, Absolute 2.84 10*3/mm3      Lymphocytes, Absolute 1.06 10*3/mm3      Monocytes, Absolute 0.38 10*3/mm3      Eosinophils, Absolute 0.24 10*3/mm3      Basophils, Absolute 0.04 10*3/mm3      Immature Grans, Absolute 0.02 10*3/mm3      nRBC 0.0 /100 WBC     POC Glucose Once [[id number]]  (Abnormal) Collected: 08/28/23 2050    Specimen: Blood Updated: 08/28/23 2055     Glucose 304 mg/dL     POC Glucose Once [[id number]]  (Abnormal) Collected: 08/28/23 1628    Specimen: Blood Updated: 08/28/23 1647     Glucose 295 mg/dL                  Radiology:    Imaging Results (Last 72 Hours)       ** No results found for the last 72 hours. **            Results for orders placed during the hospital encounter of 08/19/23    Adult Transthoracic Echo Complete W/ Cont if Necessary Per Protocol    Interpretation Summary    Left ventricular systolic function is normal. Left ventricular ejection fraction appears to be 56 - 60%.    Left ventricular diastolic  function is consistent with (grade I) impaired relaxation.    Left atrial volume is mildly increased.    Moderate mitral valve stenosis is present.    Estimated right ventricular systolic pressure from tricuspid regurgitation is mildly elevated (35-45 mmHg).    Mild pulmonary hypertension is present.    There is no evidence of pericardial effusion.      Assessment/Plan:   Debility status post prolonged hospitalization.  Patient is participating with occupational and physical therapy.  With bed mobility patient with PT is contact-guard, min assist chair to bed, min assist sit to stand, min to mod stand to sit.  Min assist toilet transfer.  Patient was able to walk 40 feet and 20 feet with a front wheel walker min to mod.  Decreased casa, gait speed, stride length.  She also has a forward flexed posture cues required for posture and weight shifting.  With OT, patient was mod assist for toileting hygiene, set up for grooming, OT continues to work on ADL, strengthening, range of motion, fine motor movement and control, endurance and range of motion.    ESRD, continue dialysis at nephrology discretion, patient's  is doing well at home and she thinks she will be able to resume PD at home.  Recheck electrolytes in the a.m.    Profound hypothyroidism, continue Synthroid at current dosing, this may be rechecked in approximately 3 to 4 weeks.    Diabetes, still not well controlled, basal insulin adjusted, continue current sliding scale.    DVT prophylaxis, subcu heparin    Hypertension, overall controlled (see blood pressure readings above) continue current medication    History of CVA, on aspirin and statin    HFpEF, compensated    Thrombocytopenia on the 28th, probably spurious, resolved     hyponatremia, labs added in a.m. to follow    Andrey Spence MD

## 2023-08-30 NOTE — SIGNIFICANT NOTE
08/30/23 1132   Plan   Plan Attempted to contact daughter Liza [phone] without success/voicemail is not set-up.

## 2023-08-30 NOTE — PLAN OF CARE
Goal Outcome Evaluation:          Problem: Rehabilitation (IRF) Plan of Care  Goal: Plan of Care Review  Outcome: Ongoing, Progressing  Flowsheets  Taken 8/29/2023 0115 by Claudine Lundberg RN  Plan of Care Reviewed With: patient  Taken 8/27/2023 1415 by Vicky Phillips, RN  Progress: improving  Goal: Patient-Specific Goal (Individualized)  Outcome: Ongoing, Progressing  Goal: Absence of New-Onset Illness or Injury  Outcome: Ongoing, Progressing  Intervention: Prevent Fall and Fall Injury  Recent Flowsheet Documentation  Taken 8/30/2023 1000 by Trina Esquivel RN  Safety Promotion/Fall Prevention: safety round/check completed  Taken 8/30/2023 0800 by Trina Esquivel RN  Safety Promotion/Fall Prevention: safety round/check completed  Intervention: Prevent Infection  Recent Flowsheet Documentation  Taken 8/30/2023 1000 by Trina Esquivel RN  Infection Prevention: hand hygiene promoted  Taken 8/30/2023 0800 by Trina Esquivel RN  Infection Prevention: hand hygiene promoted  Goal: Optimal Comfort and Wellbeing  Outcome: Ongoing, Progressing  Goal: Home and Community Transition Plan Established  Outcome: Ongoing, Progressing

## 2023-08-30 NOTE — PLAN OF CARE
Goal Outcome Evaluation:  Plan of Care Reviewed With: patient resting in bed, PD per HD nurse, tolerating well. Continues to participate in therapy, will continue to monitor.

## 2023-08-31 LAB
ANION GAP SERPL CALCULATED.3IONS-SCNC: 10.6 MMOL/L (ref 5–15)
ANISOCYTOSIS BLD QL: NORMAL
BASOPHILS # BLD AUTO: 0.03 10*3/MM3 (ref 0–0.2)
BASOPHILS NFR BLD AUTO: 0.7 % (ref 0–1.5)
BUN SERPL-MCNC: 105 MG/DL (ref 8–23)
BUN/CREAT SERPL: 20.5 (ref 7–25)
CALCIUM SPEC-SCNC: 7.7 MG/DL (ref 8.6–10.5)
CHLORIDE SERPL-SCNC: 96 MMOL/L (ref 98–107)
CO2 SERPL-SCNC: 22.4 MMOL/L (ref 22–29)
CREAT SERPL-MCNC: 5.12 MG/DL (ref 0.57–1)
DEPRECATED RDW RBC AUTO: 55 FL (ref 37–54)
EGFRCR SERPLBLD CKD-EPI 2021: 8.8 ML/MIN/1.73
EOSINOPHIL # BLD AUTO: 0.24 10*3/MM3 (ref 0–0.4)
EOSINOPHIL NFR BLD AUTO: 5.6 % (ref 0.3–6.2)
ERYTHROCYTE [DISTWIDTH] IN BLOOD BY AUTOMATED COUNT: 14.5 % (ref 12.3–15.4)
GLUCOSE BLDC GLUCOMTR-MCNC: 133 MG/DL (ref 70–130)
GLUCOSE BLDC GLUCOMTR-MCNC: 156 MG/DL (ref 70–130)
GLUCOSE BLDC GLUCOMTR-MCNC: 202 MG/DL (ref 70–130)
GLUCOSE BLDC GLUCOMTR-MCNC: 218 MG/DL (ref 70–130)
GLUCOSE SERPL-MCNC: 166 MG/DL (ref 65–99)
HCT VFR BLD AUTO: 26.8 % (ref 34–46.6)
HGB BLD-MCNC: 7.6 G/DL (ref 12–15.9)
HYPOCHROMIA BLD QL: NORMAL
IMM GRANULOCYTES # BLD AUTO: 0.01 10*3/MM3 (ref 0–0.05)
IMM GRANULOCYTES NFR BLD AUTO: 0.2 % (ref 0–0.5)
LYMPHOCYTES # BLD AUTO: 1.36 10*3/MM3 (ref 0.7–3.1)
LYMPHOCYTES NFR BLD AUTO: 31.5 % (ref 19.6–45.3)
MACROCYTES BLD QL SMEAR: NORMAL
MCH RBC QN AUTO: 29.8 PG (ref 26.6–33)
MCHC RBC AUTO-ENTMCNC: 28.4 G/DL (ref 31.5–35.7)
MCV RBC AUTO: 105.1 FL (ref 79–97)
MONOCYTES # BLD AUTO: 0.35 10*3/MM3 (ref 0.1–0.9)
MONOCYTES NFR BLD AUTO: 8.1 % (ref 5–12)
NEUTROPHILS NFR BLD AUTO: 2.33 10*3/MM3 (ref 1.7–7)
NEUTROPHILS NFR BLD AUTO: 53.9 % (ref 42.7–76)
NRBC BLD AUTO-RTO: 0 /100 WBC (ref 0–0.2)
PLAT MORPH BLD: NORMAL
PLATELET # BLD AUTO: 100 10*3/MM3 (ref 140–450)
PLATELET # BLD AUTO: 165 10*3/MM3 (ref 140–450)
PMV BLD AUTO: 9.9 FL (ref 6–12)
POTASSIUM SERPL-SCNC: 4.9 MMOL/L (ref 3.5–5.2)
RBC # BLD AUTO: 2.55 10*6/MM3 (ref 3.77–5.28)
SODIUM SERPL-SCNC: 129 MMOL/L (ref 136–145)
WBC NRBC COR # BLD: 4.32 10*3/MM3 (ref 3.4–10.8)

## 2023-08-31 PROCEDURE — 85007 BL SMEAR W/DIFF WBC COUNT: CPT | Performed by: INTERNAL MEDICINE

## 2023-08-31 PROCEDURE — 97110 THERAPEUTIC EXERCISES: CPT

## 2023-08-31 PROCEDURE — 97535 SELF CARE MNGMENT TRAINING: CPT | Performed by: OCCUPATIONAL THERAPIST

## 2023-08-31 PROCEDURE — 80048 BASIC METABOLIC PNL TOTAL CA: CPT | Performed by: INTERNAL MEDICINE

## 2023-08-31 PROCEDURE — 97116 GAIT TRAINING THERAPY: CPT

## 2023-08-31 PROCEDURE — 85049 AUTOMATED PLATELET COUNT: CPT | Performed by: INTERNAL MEDICINE

## 2023-08-31 PROCEDURE — 85025 COMPLETE CBC W/AUTO DIFF WBC: CPT | Performed by: INTERNAL MEDICINE

## 2023-08-31 PROCEDURE — 63710000001 INSULIN GLARGINE PER 5 UNITS: Performed by: INTERNAL MEDICINE

## 2023-08-31 PROCEDURE — 97530 THERAPEUTIC ACTIVITIES: CPT | Performed by: OCCUPATIONAL THERAPIST

## 2023-08-31 PROCEDURE — 97110 THERAPEUTIC EXERCISES: CPT | Performed by: OCCUPATIONAL THERAPIST

## 2023-08-31 PROCEDURE — 63710000001 INSULIN LISPRO (HUMAN) PER 5 UNITS: Performed by: INTERNAL MEDICINE

## 2023-08-31 PROCEDURE — 82948 REAGENT STRIP/BLOOD GLUCOSE: CPT

## 2023-08-31 PROCEDURE — 97530 THERAPEUTIC ACTIVITIES: CPT

## 2023-08-31 PROCEDURE — 94799 UNLISTED PULMONARY SVC/PX: CPT

## 2023-08-31 RX ADMIN — CASTOR OIL AND BALSAM, PERU 1 APPLICATION: 788; 87 OINTMENT TOPICAL at 08:20

## 2023-08-31 RX ADMIN — LEVOTHYROXINE SODIUM 50 MCG: 50 TABLET ORAL at 08:15

## 2023-08-31 RX ADMIN — INSULIN GLARGINE 15 UNITS: 100 INJECTION, SOLUTION SUBCUTANEOUS at 20:53

## 2023-08-31 RX ADMIN — FLUTICASONE PROPIONATE 2 SPRAY: 50 SPRAY, METERED NASAL at 08:17

## 2023-08-31 RX ADMIN — INSULIN LISPRO 5 UNITS: 100 INJECTION, SOLUTION INTRAVENOUS; SUBCUTANEOUS at 12:24

## 2023-08-31 RX ADMIN — Medication 1 TABLET: at 08:15

## 2023-08-31 RX ADMIN — INSULIN GLARGINE 15 UNITS: 100 INJECTION, SOLUTION SUBCUTANEOUS at 08:10

## 2023-08-31 RX ADMIN — HYDROCODONE BITARTRATE AND ACETAMINOPHEN 1 TABLET: 10; 325 TABLET ORAL at 20:53

## 2023-08-31 RX ADMIN — CASTOR OIL AND BALSAM, PERU 1 APPLICATION: 788; 87 OINTMENT TOPICAL at 20:54

## 2023-08-31 RX ADMIN — ASPIRIN 81 MG: 81 TABLET, COATED ORAL at 08:13

## 2023-08-31 RX ADMIN — PANTOPRAZOLE SODIUM 40 MG: 40 TABLET, DELAYED RELEASE ORAL at 08:15

## 2023-08-31 RX ADMIN — INSULIN LISPRO 3 UNITS: 100 INJECTION, SOLUTION INTRAVENOUS; SUBCUTANEOUS at 17:19

## 2023-08-31 RX ADMIN — ATORVASTATIN CALCIUM 80 MG: 40 TABLET, FILM COATED ORAL at 20:53

## 2023-08-31 RX ADMIN — AMLODIPINE BESYLATE 10 MG: 10 TABLET ORAL at 08:13

## 2023-08-31 RX ADMIN — TRAZODONE HYDROCHLORIDE 50 MG: 50 TABLET ORAL at 20:53

## 2023-08-31 RX ADMIN — FLUOXETINE HYDROCHLORIDE 40 MG: 20 CAPSULE ORAL at 08:14

## 2023-08-31 RX ADMIN — HYDROXYZINE HYDROCHLORIDE 12.5 MG: 25 TABLET, FILM COATED ORAL at 20:53

## 2023-08-31 RX ADMIN — ROPINIROLE HYDROCHLORIDE 0.5 MG: 0.25 TABLET, FILM COATED ORAL at 20:53

## 2023-08-31 RX ADMIN — ISOSORBIDE MONONITRATE 30 MG: 30 TABLET, EXTENDED RELEASE ORAL at 08:14

## 2023-08-31 RX ADMIN — METOPROLOL SUCCINATE 25 MG: 25 TABLET, EXTENDED RELEASE ORAL at 08:15

## 2023-08-31 RX ADMIN — TIZANIDINE 4 MG: 4 TABLET ORAL at 20:53

## 2023-08-31 RX ADMIN — ROPINIROLE HYDROCHLORIDE 0.5 MG: 0.25 TABLET, FILM COATED ORAL at 08:15

## 2023-08-31 NOTE — PLAN OF CARE
Goal Outcome Evaluation:  Plan of Care Reviewed With: patient resting in bed, PD tolerating well. Continues to participate in therapy, will continue to monitor.

## 2023-08-31 NOTE — PLAN OF CARE
Problem: Rehabilitation (IRF) Plan of Care  Goal: Plan of Care Review  Outcome: Ongoing, Progressing  Flowsheets (Taken 8/31/2023 1244)  Progress: improving  Plan of Care Reviewed With: patient  Goal: Patient-Specific Goal (Individualized)  Outcome: Ongoing, Progressing  Goal: Absence of New-Onset Illness or Injury  Outcome: Ongoing, Progressing  Goal: Optimal Comfort and Wellbeing  Outcome: Ongoing, Progressing  Goal: Home and Community Transition Plan Established  Outcome: Ongoing, Progressing     Problem: Fall Injury Risk  Goal: Absence of Fall and Fall-Related Injury  Outcome: Ongoing, Progressing     Problem: Skin Injury Risk Increased  Goal: Skin Health and Integrity  Outcome: Ongoing, Progressing     Problem: Hypertension Comorbidity  Goal: Blood Pressure in Desired Range  Outcome: Ongoing, Progressing   Goal Outcome Evaluation:  Plan of Care Reviewed With: patient        Progress: improving

## 2023-08-31 NOTE — PROGRESS NOTES
Occupational Therapy:    Physical Therapy: Individual: 105 minutes.    Speech Language Pathology:    Signed by: Patrizia Hope PTA

## 2023-08-31 NOTE — PROGRESS NOTES
[facility] Coordinator  Inpatient Rehabilitation Admission    Ethnic Group:  Marital Status:    IRF Admission Date:  08/23/2023  Admission Class:  Admit From:    Pre-Hospital Living:    Payment Sources:  Impairment Group:  Date of Onset of Impairment:    Etiologic Diagnosis Code(s):  Rank Code      Description  1    I13.2     Hypertensive heart and chronic kidney disease                 with heart failure and with stage 5 chronic                 kidney disease, or end stage renal disease    Comorbidities:  Rank Code      Description    1    N18.6     End stage renal disease  2    E46       Unspecified protein-calorie malnutrition  3    D63.1     Anemia in chronic kidney disease  4    E11.22    Type 2 diabetes mellitus with diabetic chronic                 kidney disease  5    Z99.2     Dependence on renal dialysis  6    E03.9     Hypothyroidism, unspecified  7    I50.32    Chronic diastolic (congestive) heart failure  8    E11.40    Type 2 diabetes mellitus with diabetic                 neuropathy, unspecified  9    E11.51    Type 2 diabetes mellitus with diabetic                 peripheral angiopathy without gangrene  10   E78.00    Pure hypercholesterolemia, unspecified  11   G89.29    Other chronic pain  12   J30.9     Allergic rhinitis, unspecified  13   K21.9     Gastro-esophageal reflux disease without                 esophagitis  14   K52.9     Noninfective gastroenteritis and colitis,                 unspecified  15   Z79.4     Long term (current) use of insulin  16   Z79.890   Hormone replacement therapy  17   Z86.73    Personal history of transient ischemic attack                 (TIA), and cerebral infarction without residual                 deficits  18   Z91.81    History of falling    Height on Admission:  Weight on Admission:    Are there any arthritis conditions recorded for Impairment Group, Etiologic  Diagnosis, or Comorbid Conditions that meet all of the regulatory requirements  for IRF  classification (in 42 .29(b)(2)(x), (xi), and xii))?  No    LINO Bladder Accidents:   - Accidents.    LINO Bowel Accident:  -Accidents.    Signed by: Nurse Lima

## 2023-08-31 NOTE — THERAPY TREATMENT NOTE
Inpatient Rehabilitation - Physical Therapy Treatment Note       EDWARD Veliz     Patient Name: Reny Elena  : 1960  MRN: [id number]    Today's Date: 2023                    Admit Date: 2023      Visit Dx:   No diagnosis found.    Patient Active Problem List   Diagnosis    Tibia/fibula fracture    Iron deficiency anemia    Type 2 diabetes mellitus with hyperglycemia, with long-term current use of insulin    Wound of right ankle    Cellulitis    Metabolic encephalopathy    History of non-ST elevation myocardial infarction (NSTEMI)    HTN (hypertension)    CVA (cerebral vascular accident)    Chronic diastolic CHF (congestive heart failure)    Decubitus ulcer of coccyx, unspecified pressure ulcer stage    Depression    Expressive aphasia    Impaired mobility and ADLs    Hyperkalemia    Subdural hematoma    Severe malnutrition    Cerebrovascular accident (CVA), unspecified mechanism    PRES (posterior reversible encephalopathy syndrome)    Debility       Past Medical History:   Diagnosis Date    Arthritis     Closed fracture of right fibula and tibia 2018    Depression     Diabetic ulcer of left foot associated with type 2 diabetes mellitus 2017    Diastolic dysfunction     Disease of thyroid gland     Elevated cholesterol     End stage renal disease     Essential hypertension     GERD (gastroesophageal reflux disease)     History of transfusion     Hyperlipidemia     Iron deficiency anemia 2018    PAD (peripheral artery disease)     Renal failure     Type 2 diabetes mellitus with hyperglycemia, with long-term current use of insulin 2018       Past Surgical History:   Procedure Laterality Date    ABDOMINAL SURGERY      BACK SURGERY      Post spinal diskectomy, osteophytectomy in one lumbar interspace    CATARACT EXTRACTION      Left      SECTION      DENTAL PROCEDURE      ENDOSCOPY      FRACTURE SURGERY      right leg    HYSTERECTOMY      INCISION AND DRAINAGE LEG Left  4/15/2017    Procedure: INCISION AND DRAINAGE LOWER EXTREMITY;  Surgeon: Basilio Morris MD;  Location: [facility] OR;  Service:     INCISION AND DRAINAGE LEG Left 5/26/2017    Procedure: Irrigation and Debridment abcess diabetic wound left foot with deep culture;  Surgeon: Basilio Morris MD;  Location: [facility] OR;  Service:     INSERTION PERITONEAL DIALYSIS CATHETER N/A 12/16/2021    Procedure: INSERTION PERITONEAL DIALYSIS CATHETER LAPAROSCOPIC;  Surgeon: Edy Glover MD;  Location: [facility] OR;  Service: General;  Laterality: N/A;    KNEE ARTHROSCOPY Right     ORIF TIBIA FRACTURE Right 12/28/2018    Procedure: TIBIAL  FRACTURE OPEN REDUCTION INTERNAL FIXATION;  Surgeon: Jung Barragan MD;  Location: [facility] OR;  Service: Orthopedics    TOE AMPUTATION Right     5th    TUBAL ABDOMINAL LIGATION         PT ASSESSMENT (last 12 hours)       IRF PT Evaluation and Treatment       Row Name 08/31/23 1545          PT Time and Intention    Document Type daily treatment;progress note  -RM     Mode of Treatment individual therapy;physical therapy  -RM     Patient/Family/Caregiver Comments/Observations Pt reports BLE pain this date.  -       Row Name 08/31/23 1545          General Information    Existing Precautions/Restrictions fall  peritoneal dialysis port at ABD, L patella subluxation  -RM       Row Name 08/31/23 1545          Pain Scale: FACES Pre/Post-Treatment    Pain: FACES Scale, Pretreatment 4-->hurts little more  -RM     Posttreatment Pain Rating 6-->hurts even more  -RM       Row Name 08/31/23 1545          Cognition/Psychosocial    Affect/Mental Status (Cognition) WFL  -RM     Orientation Status (Cognition) oriented x 3  -RM     Follows Commands (Cognition) verbal cues/prompting required;physical/tactile prompts required  -RM     Personal Safety Interventions gait belt;nonskid shoes/slippers when out of bed;fall prevention program maintained  -RM     Cognitive Function WFL  -RM       Row Name 08/31/23 1545           Bed Mobility    Supine-Sit Holt (Bed Mobility) contact guard;minimum assist (75% patient effort)  -RM     Sit-Supine Holt (Bed Mobility) minimum assist (75% patient effort)  requires assistance with LEs  -RM     Assistive Device (Bed Mobility) bed rails  -RM       Row Name 08/31/23 1545          Bed-Chair Transfer    Bed-Chair Holt (Transfers) minimum assist (75% patient effort);moderate assist (50% patient effort)  -RM     Assistive Device (Bed-Chair Transfers) wheelchair  -RM       Row Name 08/31/23 1545          Chair-Bed Transfer    Chair-Bed Holt (Transfers) minimum assist (75% patient effort);verbal cues;nonverbal cues (demo/gesture);moderate assist (50% patient effort)  -RM     Assistive Device (Chair-Bed Transfers) wheelchair  -RM       Row Name 08/31/23 1545          Sit-Stand Transfer    Sit-Stand Holt (Transfers) minimum assist (75% patient effort);verbal cues;nonverbal cues (demo/gesture);moderate assist (50% patient effort)  -RM     Assistive Device (Sit-Stand Transfers) wheelchair;walker, front-wheeled  -RM       Row Name 08/31/23 1545          Stand-Sit Transfer    Stand-Sit Holt (Transfers) minimum assist (75% patient effort);verbal cues;nonverbal cues (demo/gesture);moderate assist (50% patient effort)  -RM     Assistive Device (Stand-Sit Transfers) wheelchair;walker, front-wheeled  -RM       Row Name 08/31/23 1545          Toilet Transfer    Type (Toilet Transfer) stand pivot/stand step  -     Holt Level (Toilet Transfer) minimum assist (75% patient effort);nonverbal cues (demo/gesture);verbal cues;moderate assist (50% patient effort)  -RM     Assistive Device (Toilet Transfer) wheelchair;grab bars/safety frame  -RM       Row Name 08/31/23 1545          Gait/Stairs (Locomotion)    Gait/Stairs Locomotion gait/ambulation independence;gait/ambulation assistive device;distance ambulated  -     Holt Level (Gait) minimum assist (75%  patient effort);verbal cues;nonverbal cues (demo/gesture);moderate assist (50% patient effort)  -RM     Assistive Device (Gait) walker, front-wheeled  -RM     Distance in Feet (Gait) 60, 40  -RM     Pattern (Gait) step-through  -RM     Deviations/Abnormal Patterns (Gait) casa decreased;gait speed decreased;stride length decreased  -RM     Bilateral Gait Deviations forward flexed posture;heel strike decreased  -RM     Comment, (Gait/Stairs) Knee buckling noted on LLE; Pt requires assistance with RW management as she is not able to keep RW at safe distance. Pt ambulates with forward trunk flexion and decreased step length bilaterally. Faitgue noted quickly.  -RM       Row Name 08/31/23 1545          Safety Issues, Functional Mobility    Impairments Affecting Function (Mobility) balance;endurance/activity tolerance;pain;range of motion (ROM);strength  -RM       Row Name 08/31/23 1545          Balance    Dynamic Standing Balance minimal assist;moderate assist  Dynamic standing performed for dressing at bedside; Pt requires increased assistance and at least 1 UE support for dynamic standing.  -RM       Row Name 08/31/23 1545          Hip (Therapeutic Exercise)    Hip Strengthening (Therapeutic Exercise) bilateral;flexion;extension;aBduction;aDduction;external rotation;internal rotation;heel slides;marching while seated;sitting;supine;1 lb free weight;resistance band;red;2 sets;10 repetitions  #1 in sitting  -RM       Row Name 08/31/23 1545          Knee (Therapeutic Exercise)    Knee Strengthening (Therapeutic Exercise) bilateral;flexion;extension;heel slides;marching while seated;LAQ (long arc quad);SAQ (short arc quad);hamstring curls;sitting;supine;1 lb free weight;resistance band;red;2 sets;10 repetitions  #1 in sitting  -RM       Row Name 08/31/23 1545          Ankle (Therapeutic Exercise)    Ankle Strengthening (Therapeutic Exercise) bilateral;dorsiflexion;plantarflexion;sitting;supine;1 lb free weight;2 sets;10  repetitions  #1 in sitting  -RM       Row Name 08/31/23 1545          Positioning and Restraints    Pre-Treatment Position in bed  -RM     Post Treatment Position bed  -RM     In Bed supine;call light within reach;encouraged to call for assist  -RM     In Wheelchair sitting;with OT  PM  -RM       Row Name 08/31/23 1545          Therapy Assessment/Plan (PT)    Patient's Goals For Discharge return home  -RM       Row Name 08/31/23 1545          Therapy Assessment/Plan (PT)    Rehab Potential/Prognosis (PT) adequate, monitor progress closely  -RM     Frequency of Treatment (PT) 5 times per week  -RM     Estimated Duration of Therapy (PT) 2 weeks  -RM     Problem List (PT) balance;mobility;range of motion (ROM);strength;pain  -RM     Activity Limitations Related to Problem List (PT) unable to ambulate safely;unable to transfer safely  -RM       Row Name 08/31/23 1545          Daily Progress Summary (PT)    Functional Goal Overall Progress (PT) progressing toward functional goals slower than expected  -RM     Daily Progress Summary (PT) Pt continues to demonstrate significant LE weakness bilaterally; increased assistance required for all functional mobility and ambulation at this time. Conitnued skilled care required for further LE strengthening to ensure maximum safe function upon D/C.  -RM     Impairments Still Limiting Function (PT) functional activity tolerance impairment;pain;strength deficit  -RM     Recommendations (PT) Continue per current POC  -RM       Row Name 08/31/23 1545          Therapy Plan Review/Discharge Plan (PT)    Anticipated Equipment Needs at Discharge (PT Eval) --  tbd  -RM     Anticipated Discharge Disposition (PT) home with assist;home with home health;home with outpatient therapy services  -RM       Row Name 08/31/23 1545          IRF PT Goals    Bed Mobility Goal Selection (PT-IRF) bed mobility, PT goal 1  -RM     Transfer Goal Selection (PT-IRF) transfers, PT goal 1  -RM     Gait (Walking  Locomotion) Goal Selection (PT-IRF) gait, PT goal 1  -RM     Stairs Goal Selection (PT-IRF) stairs, PT goal 1  -RM       Row Name 08/31/23 1545          Bed Mobility Goal 1 (PT-IRF)    Activity/Assistive Device (Bed Mobility Goal 1, PT-IRF) sit to supine/supine to sit  -RM     Telferner Level (Bed Mobility Goal 1, PT-IRF) independent  -RM     Time Frame (Bed Mobility Goal 1, PT-IRF) by discharge  -RM     Progress/Outcomes (Bed Mobility Goal 1, PT-IRF) goal ongoing  -RM       Row Name 08/31/23 1545          Transfer Goal 1 (PT-IRF)    Activity/Assistive Device (Transfer Goal 1, PT-IRF) sit-to-stand/stand-to-sit;bed-to-chair/chair-to-bed  -RM     T[place]ner Level (Transfer Goal 1, PT-IRF) modified independence  -RM     Time Frame (Transfer Goal 1, PT-IRF) by discharge  -RM     Progress/Outcomes (Transfer Goal 1, PT-IRF) goal ongoing  -RM       Row Name 08/31/23 1545          Gait/Walking Locomotion Goal 1 (PT-IRF)    Activity/Assistive Device (Gait/Walking Locomotion Goal 1, PT-IRF) walker, rolling  -RM     Gait/Walking Locomotion Distance Goal 1 (PT-IRF) 300'  -RM     T[place]ner Level (Gait/Walking Locomotion Goal 1, PT-IRF) supervision required  -RM     Time Frame (Gait/Walking Locomotion Goal 1, PT-IRF) by discharge  -RM     Progress/Outcomes (Gait/Walking Locomotion Goal 1, PT-IRF) goal ongoing  -RM       Row Name 08/31/23 1545          Stairs Goal 1 (PT-IRF)    Activity/Assistive Device (Stairs Goal 1, PT-IRF) ascending stairs;descending stairs;using handrail, left;using handrail, right  -RM     Number of Stairs (Stairs Goal 1, PT-IRF) 5  -RM     T[place]ner Level (Stairs Goal 1, PT-IRF) supervision required  -RM     Time Frame (Stairs Goal 1, PT-IRF) by discharge  -RM     Progress/Outcomes (Stairs Goal 1, PT-IRF) goal ongoing  -RM               User Key  (r) = Recorded By, (t) = Taken By, (c) = Cosigned By      Initials Name Provider Type    Patrizia Sauceda, PTA Physical Therapist Assistant                   Wound 08/23/23 1923 Right posterior heel (Active)   Dressing Appearance open to air 08/30/23 2120   Closure None 08/30/23 2120   Base blanchable;pink 08/30/23 2120   Drainage Amount none 08/30/23 2120   Care, Wound barrier applied 08/30/23 2120   Dressing Care open to air 08/30/23 2120       Wound 08/23/23 2313 Left gluteal (Active)   Closure None 08/30/23 2120   Base non-blanchable 08/30/23 2120   Periwound blistered 08/30/23 2120   Drainage Amount none 08/30/23 2120   Care, Wound barrier applied 08/30/23 2120   Dressing Care open to air 08/30/23 2120     Physical Therapy Education       Title: PT OT SLP Therapies (In Progress)       Topic: Physical Therapy (In Progress)       Point: Mobility training (In Progress)       Learning Progress Summary             Patient Acceptance, TB, NR by DL at 8/30/2023 2120    Acceptance, E,TB, VU by RM at 8/30/2023 1530    Acceptance, TB, NR by DL at 8/29/2023 2104    Acceptance, E,TB, VU by RM at 8/29/2023 1600    Acceptance, TB, NR by DL at 8/28/2023 2048    Acceptance, E,TB, VU,DU by HC at 8/28/2023 1451    Acceptance, D,E, VU,NR by RG at 8/28/2023 1436    Acceptance, TB, NR by DL at 8/27/2023 2309    Acceptance, E,D, VU,NR by RG at 8/26/2023 1522    Acceptance, E,D, VU,NR by LL at 8/25/2023 1525    Acceptance, E,D, VU,NR by RG at 8/25/2023 1433    Acceptance, E, VU,NR by LB at 8/24/2023 1149                         Point: Home exercise program (In Progress)       Learning Progress Summary             Patient Acceptance, TB, NR by DL at 8/30/2023 2120    Acceptance, E,TB, VU by RM at 8/30/2023 1530    Acceptance, TB, NR by DL at 8/29/2023 2104    Acceptance, E,TB, VU by RM at 8/29/2023 1600    Acceptance, TB, NR by DL at 8/28/2023 2048    Acceptance, E,TB, VU,DU by HC at 8/28/2023 1451    Acceptance, D,E, VU,NR by RG at 8/28/2023 1436    Acceptance, TB, NR by DL at 8/27/2023 2309    Acceptance, E,D, VU,NR by RG at 8/26/2023 1522    Acceptance, E,D, VU,NR by LL  at 8/25/2023 1525    Acceptance, E,D, VU,NR by RG at 8/25/2023 1433    Acceptance, E, VU,NR by LB at 8/24/2023 1149                         Point: Body mechanics (In Progress)       Learning Progress Summary             Patient Acceptance, TB, NR by DL at 8/30/2023 2120    Acceptance, E,TB, VU by RM at 8/30/2023 1530    Acceptance, TB, NR by DL at 8/29/2023 2104    Acceptance, E,TB, VU by RM at 8/29/2023 1600    Acceptance, TB, NR by DL at 8/28/2023 2048    Acceptance, E,TB, VU,DU by HC at 8/28/2023 1451    Acceptance, D,E, VU,NR by RG at 8/28/2023 1436    Acceptance, TB, NR by DL at 8/27/2023 2309    Acceptance, E,D, VU,NR by RG at 8/26/2023 1522    Acceptance, E,D, VU,NR by LL at 8/25/2023 1525    Acceptance, E,D, VU,NR by RG at 8/25/2023 1433    Acceptance, E, VU,NR by LB at 8/24/2023 1149                         Point: Precautions (In Progress)       Learning Progress Summary             Patient Acceptance, TB, NR by DL at 8/30/2023 2120    Acceptance, E,TB, VU by RM at 8/30/2023 1530    Acceptance, TB, NR by DL at 8/29/2023 2104    Acceptance, E,TB, VU by RM at 8/29/2023 1600    Acceptance, TB, NR by DL at 8/28/2023 2048    Acceptance, E,TB, VU,DU by HC at 8/28/2023 1451    Acceptance, D,E, VU,NR by RG at 8/28/2023 1436    Acceptance, TB, NR by DL at 8/27/2023 2309    Acceptance, E,D, VU,NR by RG at 8/26/2023 1522    Acceptance, E,D, VU,NR by LL at 8/25/2023 1525    Acceptance, E,D, VU,NR by RG at 8/25/2023 1433    Acceptance, E, VU,NR by LB at 8/24/2023 1149                                         User Key       Initials Effective Dates Name Provider Type Discipline    LB 06/16/21 -  Christel Ferguson, PT Physical Therapist PT    LL 05/02/16 -  Carly Lundberg, PTA Physical Therapist Assistant PT    RM 02/17/23 -  Patrizia Hope, PTA Physical Therapist Assistant PT    RG 06/16/21 -  Rajiv Pagan, SHITAL Physical Therapist Assistant PT    HC 01/20/23 -  Anaid Bo, PTA Physical Therapist  Assistant PT    DL 03/16/22 -  Claudine Lundberg RN Registered Nurse Nurse                    PT Recommendation and Plan    Frequency of Treatment (PT): 5 times per week  Anticipated Equipment Needs at Discharge (PT Eval):  (tbd)  Daily Progress Summary (PT)  Functional Goal Overall Progress (PT): progressing toward functional goals slower than expected  Daily Progress Summary (PT): Pt continues to demonstrate significant LE weakness bilaterally; increased assistance required for all functional mobility and ambulation at this time. Conitnued skilled care required for further LE strengthening to ensure maximum safe function upon D/C.  Impairments Still Limiting Function (PT): functional activity tolerance impairment, pain, strength deficit  Recommendations (PT): Continue per current POC               Time Calculation:      PT Charges       Row Name 08/31/23 1552 08/31/23 1551          Time Calculation    Start Time 1245  -RM 1045  -RM     Stop Time 1330  -RM 1145  -RM     Time Calculation (min) 45 min  -RM 60 min  -RM     PT Received On 08/31/23  -RM 08/31/23  -RM     PT - Next Appointment 09/01/23  -RM 08/31/23  -RM     PT Goal Re-Cert Due Date 09/07/23  -RM 08/31/23  -RM        Time Calculation- PT    Total Timed Code Minutes- PT 45 minute(s)  -RM 60 minute(s)  -RM               User Key  (r) = Recorded By, (t) = Taken By, (c) = Cosigned By      Initials Name Provider Type    RM Patrizia Hope PTA Physical Therapist Assistant                    Therapy Charges for Today       Code Description Service Date Service Provider Modifiers Qty    36608998804 HC GAIT TRAINING EA 15 MIN 8/30/2023 Patrizia Hope PTA GP, CQ 1    39946529835 HC PT THER PROC EA 15 MIN 8/30/2023 Patrizia Hope PTA GP, CQ 4    10174350310 HC PT THERAPEUTIC ACT EA 15 MIN 8/30/2023 Patrizia Hope PTA GP, CQ 1    55741912075 HC GAIT TRAINING EA 15 MIN 8/31/2023 Patrizia Hope PTA GP, CQ 1     27463206908 HC PT THER PROC EA 15 MIN 8/31/2023 Patrizia Hoep PTA GP, CQ 4    12354951478 HC PT THERAPEUTIC ACT EA 15 MIN 8/31/2023 Patrizia Hope, SHITAL GP, CQ 2              PT G-Codes  AM-PAC 6 Clicks Score (PT): 17      Patrizia Hope PTA  8/31/2023

## 2023-08-31 NOTE — THERAPY TREATMENT NOTE
Inpatient Rehabilitation - Occupational Therapy Treatment Note    EDWARD Veliz     Patient Name: Reny Elena  : 1960  MRN: [id number]    Today's Date: 2023                 Admit Date: 2023       No diagnosis found.    Patient Active Problem List   Diagnosis    Tibia/fibula fracture    Iron deficiency anemia    Type 2 diabetes mellitus with hyperglycemia, with long-term current use of insulin    Wound of right ankle    Cellulitis    Metabolic encephalopathy    History of non-ST elevation myocardial infarction (NSTEMI)    HTN (hypertension)    CVA (cerebral vascular accident)    Chronic diastolic CHF (congestive heart failure)    Decubitus ulcer of coccyx, unspecified pressure ulcer stage    Depression    Expressive aphasia    Impaired mobility and ADLs    Hyperkalemia    Subdural hematoma    Severe malnutrition    Cerebrovascular accident (CVA), unspecified mechanism    PRES (posterior reversible encephalopathy syndrome)    Debility       Past Medical History:   Diagnosis Date    Arthritis     Closed fracture of right fibula and tibia 2018    Depression     Diabetic ulcer of left foot associated with type 2 diabetes mellitus 2017    Diastolic dysfunction     Disease of thyroid gland     Elevated cholesterol     End stage renal disease     Essential hypertension     GERD (gastroesophageal reflux disease)     History of transfusion     Hyperlipidemia     Iron deficiency anemia 2018    PAD (peripheral artery disease)     Renal failure     Type 2 diabetes mellitus with hyperglycemia, with long-term current use of insulin 2018       Past Surgical History:   Procedure Laterality Date    ABDOMINAL SURGERY      BACK SURGERY      Post spinal diskectomy, osteophytectomy in one lumbar interspace    CATARACT EXTRACTION      Left      SECTION      DENTAL PROCEDURE      ENDOSCOPY      FRACTURE SURGERY      right leg    HYSTERECTOMY      INCISION AND DRAINAGE LEG Left 4/15/2017     Procedure: INCISION AND DRAINAGE LOWER EXTREMITY;  Surgeon: Basilio Morris MD;  Location:  [facility] OR;  Service:     INCISION AND DRAINAGE LEG Left 5/26/2017    Procedure: Irrigation and Debridment abcess diabetic wound left foot with deep culture;  Surgeon: Basilio Morris MD;  Location: [facility] OR;  Service:     INSERTION PERITONEAL DIALYSIS CATHETER N/A 12/16/2021    Procedure: INSERTION PERITONEAL DIALYSIS CATHETER LAPAROSCOPIC;  Surgeon: Edy Glover MD;  Location: [facility] OR;  Service: General;  Laterality: N/A;    KNEE ARTHROSCOPY Right     ORIF TIBIA FRACTURE Right 12/28/2018    Procedure: TIBIAL  FRACTURE OPEN REDUCTION INTERNAL FIXATION;  Surgeon: Jung Barragan MD;  Location: [facility] OR;  Service: Orthopedics    TOE AMPUTATION Right     5th    TUBAL ABDOMINAL LIGATION               IRF OT ASSESSMENT FLOWSHEET (last 12 hours)       IRF OT Evaluation and Treatment       Row Name 08/31/23 1430          OT Time and Intention    Document Type daily treatment  -BF     Mode of Treatment occupational therapy  -BF     Patient Effort good  -BF     Symptoms Noted During/After Treatment fatigue  -BF       Row Name 08/31/23 1430          General Information    Existing Precautions/Restrictions fall  peritoneal dialysis port at ABD, L patella subluxation  -BF       Row Name 08/31/23 1430          Cognition/Psychosocial    Orientation Status (Cognition) oriented x 3  -BF     Follows Commands (Cognition) follows one-step commands;verbal cues/prompting required  -BF       Row Name 08/31/23 1430          Grooming    [place] Level (Grooming) set up;supervision  -BF     Position (Grooming) edge of bed sitting  -BF       Row Name 08/31/23 1430          Bed-Chair Transfer    Bed-Chair [place] (Transfers) minimum assist (75% patient effort)  -BF     Assistive Device (Bed-Chair Transfers) wheelchair  -BF       Row Name 08/31/23 1430          Chair-Bed Transfer    Chair-Bed [place] (Transfers) minimum assist (75%  patient effort);verbal cues;nonverbal cues (demo/gesture)  -BF     Assistive Device (Chair-Bed Transfers) wheelchair  -BF       Row Name 08/31/23 1430          Motor Skills    Motor Control/Coordination Interventions fine motor manipulation/dexterity activities;gross motor coordination activities;therapeutic exercise/ROM  BUE GMC/FMC theract, strengthening; BUE rickshaw 15 lbs X 15X7, PRE 4 mins X3, flexbar therex  -BF       Row Name 08/31/23 1430          Positioning and Restraints    In Bed supine;call light within reach;encouraged to call for assist;exit alarm on  In PM  -BF     In Wheelchair sitting;call light within reach;encouraged to call for assist  In AM  -BF               User Key  (r) = Recorded By, (t) = Taken By, (c) = Cosigned By      Initials Name Effective Dates    BF Mendez Yaelmaritza Juarez, OT 07/11/23 -                      Occupational Therapy Education       Title: PT OT SLP Therapies (In Progress)       Topic: Occupational Therapy (Done)       Point: ADL training (Done)       Description:   Instruct learner(s) on proper safety adaptation and remediation techniques during self care or transfers.   Instruct in proper use of assistive devices.                  Learning Progress Summary             Patient Acceptance, E, VU,NR by BF at 8/31/2023 1430    Acceptance, E, NR by BF at 8/30/2023 1235    Acceptance, TB, NR by DL at 8/29/2023 2104    Acceptance, E, VU,NR by BF at 8/29/2023 1447    Acceptance, TB, NR by DL at 8/28/2023 2048    Acceptance, E,D, NR,VU by CJ at 8/28/2023 1505    Acceptance, TB, NR by DL at 8/27/2023 2309    Acceptance, E, VU,NR by HB at 8/26/2023 1417    Acceptance, E, VU,NR by BF at 8/25/2023 1438    Acceptance, E, VU,NR by BF at 8/24/2023 1450                         Point: Precautions (Done)       Description:   Instruct learner(s) on prescribed precautions during self-care and functional transfers.                  Learning Progress Summary             Patient Acceptance,  E, VU,NR by BF at 8/31/2023 1430    Acceptance, E, NR by BF at 8/30/2023 1235    Acceptance, TB, NR by DL at 8/29/2023 2104    Acceptance, E, VU,NR by BF at 8/29/2023 1447    Acceptance, TB, NR by DL at 8/28/2023 2048    Acceptance, E,D, NR,VU by CJ at 8/28/2023 1505    Acceptance, TB, NR by DL at 8/27/2023 2309    Acceptance, E, VU,NR by HB at 8/26/2023 1417    Acceptance, E, VU,NR by BF at 8/25/2023 1438    Acceptance, E, VU,NR by BF at 8/24/2023 1450                                         User Key       Initials Effective Dates Name Provider Type Discipline    BF 07/11/23 -  Yael Mendez, OT Occupational Therapist OT    CJ 06/16/21 -  Nichelle Lagunas, OT Occupational Therapist OT    HB 05/25/21 -  Ella Wallace OT Occupational Therapist OT    DL 03/16/22 -  Claudine Lundberg, RN Registered Nurse Nurse                        OT Recommendation and Plan    Planned Therapy Interventions (OT): activity tolerance training, adaptive equipment training, BADL retraining, ROM/therapeutic exercise, strengthening exercise, transfer/mobility retraining                    Time Calculation:      Time Calculation- OT       Row Name 08/31/23 1434 08/31/23 0915          Time Calculation- OT    OT Start Time 1330  -BF 0915  -BF     OT Stop Time 1415  -BF 1000  -BF     OT Time Calculation (min) 45 min  -BF 45 min  -BF     Total Timed Code Minutes- OT 45 minute(s)  -BF 45 minute(s)  -BF     OT Non-Billable Time (min) -- 10 min  -BF               User Key  (r) = Recorded By, (t) = Taken By, (c) = Cosigned By      Initials Name Provider Type    BF Yael Mendez OT Occupational Therapist                  Therapy Charges for Today       Code Description Service Date Service Provider Modifiers Qty    65616285991 HC OT SELF CARE/MGMT/TRAIN EA 15 MIN 8/30/2023 Yael Mendez OT GO 3    29381438063 HC OT THER PROC EA 15 MIN 8/30/2023 Yael Mendez OT GO 2    66673628916 HC OT THERAPEUTIC ACT EA 15 MIN  8/30/2023 Yael Mendez, OT GO 1    24515881225 HC OT SELF CARE/MGMT/TRAIN EA 15 MIN 8/31/2023 Yael Mendez OT GO 1    64100377559 HC OT THERAPEUTIC ACT EA 15 MIN 8/31/2023 Yael Mendez OT GO 2    36406693305 HC OT THER PROC EA 15 MIN 8/31/2023 Yael Mendez OT GO 3                     Yael Mendez OT  8/31/2023

## 2023-08-31 NOTE — PROGRESS NOTES
Nephrology Progress Note      Subjective   Feels a little better patient puffiness, no shortness of breath    Objective       Vital signs :     Temp:  [97.6 °F (36.4 °C)-98.2 °F (36.8 °C)] 97.9 °F (36.6 °C)  Heart Rate:  [71-86] 73  Resp:  [16-18] 18  BP: (106-158)/(55-81) 158/81    Intake/Output                               08/29/23 0701 - 08/30/23 0700 08/30/23 0701 - 08/31/23 0700 08/31/23 0701 - 09/01/23 0700     7263-1675 3699-7443 Total 1320-8056 2256-8506 Total 4338-1147 6801-8285 Total                    Intake    P.O.  720  360 1080  960  240 1200  240  -- 240    Total Intake   240 -- 240       Output    Dialysis  640  1154 1794  --  -- --  1116  -- 1116    Total Output 640 1154 1794 -- -- -- 1116 -- 1116             Physical Exam:  General Appearance : alert, pleasant, appears stated age, cooperative and alert  Lungs : Decreased intensity of breath sounds  Heart :  regular rhythm & normal rate, normal S1, S2 and no murmur, no rub  Abdomen : soft, non distended  Extremities : 1+ edema  Neurologic :   orientated to person, place, time and situation, Grossly no focal deficits        Laboratory Data :     Albumin Albumin   Date Value Ref Range Status   08/29/2023 2.5 (L) 3.5 - 5.2 g/dL Final          Magnesium No results found for: MG           PTH               No results found for: PTH    CBC and coagulation:  Results from last 7 days   Lab Units 08/31/23 0239 08/29/23 0327 08/28/23  0230   WBC 10*3/mm3 4.32 4.58 4.97   HEMOGLOBIN g/dL 7.6* 8.2* 7.8*   HEMATOCRIT % 26.8* 25.6* 25.3*   MCV fL 105.1* 93.8 96.6   MCHC g/dL 28.4* 32.0 30.8*   PLATELETS 10*3/mm3 100* 151 128*     Acid/base balance:      Renal and electrolytes:    Results from last 7 days   Lab Units 08/31/23 0239 08/29/23 0327 08/28/23  0230 08/26/23  0020   SODIUM mmol/L 129* 127* 127* 133*   POTASSIUM mmol/L 4.9 4.7 4.6 4.1   CHLORIDE mmol/L 96* 92* 92* 98   CO2 mmol/L 22.4 23.3 23.0 23.3   BUN mg/dL 105* 86* 77*  61*   CREATININE mg/dL 5.12* 4.85* 4.78* 4.69*   CALCIUM mg/dL 7.7* 7.7* 7.6* 7.4*     Estimated Creatinine Clearance: 9.7 mL/min (A) (by C-G formula based on SCr of 5.12 mg/dL (H)).  @GFRCG:3@   Liver and pancreatic function:  Results from last 7 days   Lab Units 08/29/23  0327   ALBUMIN g/dL 2.5*   BILIRUBIN mg/dL 0.2   ALK PHOS U/L 101   AST (SGOT) U/L 26   ALT (SGPT) U/L 23         Cardiac:      Liver and pancreatic function:  Results from last 7 days   Lab Units 08/29/23  0327   ALBUMIN g/dL 2.5*   BILIRUBIN mg/dL 0.2   ALK PHOS U/L 101   AST (SGOT) U/L 26   ALT (SGPT) U/L 23       Medications :     amLODIPine, 10 mg, Oral, Daily  aspirin, 81 mg, Oral, Daily  atorvastatin, 80 mg, Oral, Nightly  castor oil-balsam peru, 1 application , Topical, Q12H  FLUoxetine, 40 mg, Oral, Daily  fluticasone, 2 spray, Each Nare, Daily  insulin glargine, 15 Units, Subcutaneous, Q12H  insulin lispro, 3-14 Units, Subcutaneous, TID With Meals  isosorbide mononitrate, 30 mg, Oral, Daily  levothyroxine, 50 mcg, Oral, Daily  metoprolol succinate XL, 25 mg, Oral, Daily  multivitamin, 1 tablet, Oral, Daily  pantoprazole, 40 mg, Oral, Daily  rOPINIRole, 0.5 mg, Oral, BID  traZODone, 50 mg, Oral, Nightly             Assessment & Plan     1.  End-stage renal disease on peritoneal dialysis  2.  Type 2 diabetes with nephropathy  3.  Chronic diarrhea now worse  4.  New onset hypothyroidism  5.  Hyponatremia, multifactorial including hypovolemic  6.  Hypertension  7.  Malnutrition  8.  Diabetic neuropathy and myopathy with inability to ambulate independently  9.  Anemia of CKD      Patient still have mild fluid overload clinically so we will continue on dialysis using 1.5% and 2.5% mix today as well       Plan of care was discussed with the patient, understood verbalized agreed        Nicholas Arroyo MD  08/31/23  09:40 EDT

## 2023-09-01 LAB
GLUCOSE BLDC GLUCOMTR-MCNC: 211 MG/DL (ref 70–130)
GLUCOSE BLDC GLUCOMTR-MCNC: 222 MG/DL (ref 70–130)
GLUCOSE BLDC GLUCOMTR-MCNC: 243 MG/DL (ref 70–130)
GLUCOSE BLDC GLUCOMTR-MCNC: 64 MG/DL (ref 70–130)
GLUCOSE BLDC GLUCOMTR-MCNC: 83 MG/DL (ref 70–130)

## 2023-09-01 PROCEDURE — 97110 THERAPEUTIC EXERCISES: CPT

## 2023-09-01 PROCEDURE — 63710000001 INSULIN GLARGINE PER 5 UNITS: Performed by: INTERNAL MEDICINE

## 2023-09-01 PROCEDURE — 97535 SELF CARE MNGMENT TRAINING: CPT

## 2023-09-01 PROCEDURE — 99232 SBSQ HOSP IP/OBS MODERATE 35: CPT | Performed by: INTERNAL MEDICINE

## 2023-09-01 PROCEDURE — 97530 THERAPEUTIC ACTIVITIES: CPT

## 2023-09-01 PROCEDURE — 63710000001 INSULIN LISPRO (HUMAN) PER 5 UNITS: Performed by: INTERNAL MEDICINE

## 2023-09-01 PROCEDURE — 97116 GAIT TRAINING THERAPY: CPT

## 2023-09-01 PROCEDURE — 82948 REAGENT STRIP/BLOOD GLUCOSE: CPT

## 2023-09-01 RX ORDER — AMLODIPINE BESYLATE 5 MG/1
5 TABLET ORAL DAILY
Status: DISCONTINUED | OUTPATIENT
Start: 2023-09-02 | End: 2023-09-04

## 2023-09-01 RX ADMIN — HYDROXYZINE HYDROCHLORIDE 12.5 MG: 25 TABLET, FILM COATED ORAL at 20:16

## 2023-09-01 RX ADMIN — HYDROCODONE BITARTRATE AND ACETAMINOPHEN 1 TABLET: 10; 325 TABLET ORAL at 11:15

## 2023-09-01 RX ADMIN — CASTOR OIL AND BALSAM, PERU 1 APPLICATION: 788; 87 OINTMENT TOPICAL at 20:21

## 2023-09-01 RX ADMIN — INSULIN LISPRO 5 UNITS: 100 INJECTION, SOLUTION INTRAVENOUS; SUBCUTANEOUS at 17:35

## 2023-09-01 RX ADMIN — LEVOTHYROXINE SODIUM 50 MCG: 50 TABLET ORAL at 09:03

## 2023-09-01 RX ADMIN — FLUTICASONE PROPIONATE 2 SPRAY: 50 SPRAY, METERED NASAL at 09:01

## 2023-09-01 RX ADMIN — ROPINIROLE HYDROCHLORIDE 0.5 MG: 0.25 TABLET, FILM COATED ORAL at 09:02

## 2023-09-01 RX ADMIN — HYDROCODONE BITARTRATE AND ACETAMINOPHEN 1 TABLET: 10; 325 TABLET ORAL at 20:15

## 2023-09-01 RX ADMIN — ROPINIROLE HYDROCHLORIDE 0.5 MG: 0.25 TABLET, FILM COATED ORAL at 20:15

## 2023-09-01 RX ADMIN — FLUOXETINE HYDROCHLORIDE 40 MG: 20 CAPSULE ORAL at 09:03

## 2023-09-01 RX ADMIN — ISOSORBIDE MONONITRATE 30 MG: 30 TABLET, EXTENDED RELEASE ORAL at 09:03

## 2023-09-01 RX ADMIN — PANTOPRAZOLE SODIUM 40 MG: 40 TABLET, DELAYED RELEASE ORAL at 09:02

## 2023-09-01 RX ADMIN — Medication 1 TABLET: at 09:02

## 2023-09-01 RX ADMIN — CASTOR OIL AND BALSAM, PERU 1 APPLICATION: 788; 87 OINTMENT TOPICAL at 12:40

## 2023-09-01 RX ADMIN — TRAZODONE HYDROCHLORIDE 50 MG: 50 TABLET ORAL at 20:15

## 2023-09-01 RX ADMIN — TIZANIDINE 4 MG: 4 TABLET ORAL at 20:16

## 2023-09-01 RX ADMIN — ASPIRIN 81 MG: 81 TABLET, COATED ORAL at 09:03

## 2023-09-01 RX ADMIN — INSULIN GLARGINE 10 UNITS: 100 INJECTION, SOLUTION SUBCUTANEOUS at 12:35

## 2023-09-01 RX ADMIN — ATORVASTATIN CALCIUM 80 MG: 40 TABLET, FILM COATED ORAL at 20:16

## 2023-09-01 NOTE — PROGRESS NOTES
Nephrology Progress Note      Subjective   Doing well no complaints.    Objective       Vital signs :     Temp:  [97.7 °F (36.5 °C)-98.2 °F (36.8 °C)] 97.7 °F (36.5 °C)  Heart Rate:  [67-74] 67  Resp:  [16-18] 16  BP: ()/(58-62) 98/58    Intake/Output                         08/30/23 0701 - 08/31/23 0700 08/31/23 0701 - 09/01/23 0700     0803-8430 5929-9516 Total 0758-1740 8115-3232 Total                 Intake    P.O.  960  240 1200  600  80 680    Total Intake  600 80 680       Output    Dialysis  --  -- --  1116  1027 2143    Total Output -- -- -- 1116 1027 2143             Physical Exam:  General Appearance : alert, pleasant, appears stated age, cooperative and alert  Lungs : Decreased intensity of breath sounds  Heart :  regular rhythm & normal rate, normal S1, S2 and no murmur, no rub  Abdomen : soft, non distended  Extremities : 1+ edema  Neurologic :   orientated to person, place, time and situation, Grossly no focal deficits        Laboratory Data :     Albumin No results found for: ALBUMIN         Magnesium No results found for: MG           PTH               No results found for: PTH    CBC and coagulation:  Results from last 7 days   Lab Units 08/31/23  1618 08/31/23  0239 08/29/23  0327 08/28/23  0230   WBC 10*3/mm3  --  4.32 4.58 4.97   HEMOGLOBIN g/dL  --  7.6* 8.2* 7.8*   HEMATOCRIT %  --  26.8* 25.6* 25.3*   MCV fL  --  105.1* 93.8 96.6   MCHC g/dL  --  28.4* 32.0 30.8*   PLATELETS 10*3/mm3 165 100* 151 128*     Acid/base balance:      Renal and electrolytes:    Results from last 7 days   Lab Units 08/31/23  0239 08/29/23  0327 08/28/23  0230 08/26/23  0020   SODIUM mmol/L 129* 127* 127* 133*   POTASSIUM mmol/L 4.9 4.7 4.6 4.1   CHLORIDE mmol/L 96* 92* 92* 98   CO2 mmol/L 22.4 23.3 23.0 23.3   BUN mg/dL 105* 86* 77* 61*   CREATININE mg/dL 5.12* 4.85* 4.78* 4.69*   CALCIUM mg/dL 7.7* 7.7* 7.6* 7.4*     Estimated Creatinine Clearance: 9.7 mL/min (A) (by C-G formula based on SCr of  5.12 mg/dL (H)).  @GFRCG:3@   Liver and pancreatic function:  Results from last 7 days   Lab Units 08/29/23  0327   ALBUMIN g/dL 2.5*   BILIRUBIN mg/dL 0.2   ALK PHOS U/L 101   AST (SGOT) U/L 26   ALT (SGPT) U/L 23         Cardiac:      Liver and pancreatic function:  Results from last 7 days   Lab Units 08/29/23  0327   ALBUMIN g/dL 2.5*   BILIRUBIN mg/dL 0.2   ALK PHOS U/L 101   AST (SGOT) U/L 26   ALT (SGPT) U/L 23       Medications :     amLODIPine, 10 mg, Oral, Daily  aspirin, 81 mg, Oral, Daily  atorvastatin, 80 mg, Oral, Nightly  castor oil-balsam peru, 1 application , Topical, Q12H  FLUoxetine, 40 mg, Oral, Daily  fluticasone, 2 spray, Each Nare, Daily  insulin glargine, 15 Units, Subcutaneous, Q12H  insulin lispro, 3-14 Units, Subcutaneous, TID With Meals  isosorbide mononitrate, 30 mg, Oral, Daily  levothyroxine, 50 mcg, Oral, Daily  metoprolol succinate XL, 25 mg, Oral, Daily  multivitamin, 1 tablet, Oral, Daily  pantoprazole, 40 mg, Oral, Daily  rOPINIRole, 0.5 mg, Oral, BID  traZODone, 50 mg, Oral, Nightly             Assessment & Plan     1.  End-stage renal disease on peritoneal dialysis  2.  Type 2 diabetes with nephropathy  3.  Chronic diarrhea now worse  4.  New onset hypothyroidism  5.  Hyponatremia, multifactorial including hypovolemic  6.  Hypertension  7.  Malnutrition  8.  Diabetic neuropathy and myopathy with inability to ambulate independently  9.  Anemia of CKD    Continue on CCPD using 1.5% and 2.5% mix dialysate.  Grossly stable from nephrology standpoint.    Plan of care was discussed with the patient, understood verbalized agreed        Nicholas Arroyo MD  09/01/23  08:04 EDT

## 2023-09-01 NOTE — THERAPY TREATMENT NOTE
Inpatient Rehabilitation - Physical Therapy Treatment Note       EDWARD Veliz     Patient Name: Reny Elena  : 1960  MRN: [id number]    Today's Date: 2023                    Admit Date: 2023      Visit Dx:   No diagnosis found.    Patient Active Problem List   Diagnosis    Tibia/fibula fracture    Iron deficiency anemia    Type 2 diabetes mellitus with hyperglycemia, with long-term current use of insulin    Wound of right ankle    Cellulitis    Metabolic encephalopathy    History of non-ST elevation myocardial infarction (NSTEMI)    HTN (hypertension)    CVA (cerebral vascular accident)    Chronic diastolic CHF (congestive heart failure)    Decubitus ulcer of coccyx, unspecified pressure ulcer stage    Depression    Expressive aphasia    Impaired mobility and ADLs    Hyperkalemia    Subdural hematoma    Severe malnutrition    Cerebrovascular accident (CVA), unspecified mechanism    PRES (posterior reversible encephalopathy syndrome)    Debility       Past Medical History:   Diagnosis Date    Arthritis     Closed fracture of right fibula and tibia 2018    Depression     Diabetic ulcer of left foot associated with type 2 diabetes mellitus 2017    Diastolic dysfunction     Disease of thyroid gland     Elevated cholesterol     End stage renal disease     Essential hypertension     GERD (gastroesophageal reflux disease)     History of transfusion     Hyperlipidemia     Iron deficiency anemia 2018    PAD (peripheral artery disease)     Renal failure     Type 2 diabetes mellitus with hyperglycemia, with long-term current use of insulin 2018       Past Surgical History:   Procedure Laterality Date    ABDOMINAL SURGERY      BACK SURGERY      Post spinal diskectomy, osteophytectomy in one lumbar interspace    CATARACT EXTRACTION      Left      SECTION      DENTAL PROCEDURE      ENDOSCOPY      FRACTURE SURGERY      right leg    HYSTERECTOMY      INCISION AND DRAINAGE LEG Left  4/15/2017    Procedure: INCISION AND DRAINAGE LOWER EXTREMITY;  Surgeon: Basilio Morris MD;  Location: [facility] OR;  Service:     INCISION AND DRAINAGE LEG Left 5/26/2017    Procedure: Irrigation and Debridment abcess diabetic wound left foot with deep culture;  Surgeon: Basilio Morris MD;  Location: [facility] OR;  Service:     INSERTION PERITONEAL DIALYSIS CATHETER N/A 12/16/2021    Procedure: INSERTION PERITONEAL DIALYSIS CATHETER LAPAROSCOPIC;  Surgeon: Edy Glover MD;  Location: [facility] OR;  Service: General;  Laterality: N/A;    KNEE ARTHROSCOPY Right     ORIF TIBIA FRACTURE Right 12/28/2018    Procedure: TIBIAL  FRACTURE OPEN REDUCTION INTERNAL FIXATION;  Surgeon: Jung Barragan MD;  Location: [facility] OR;  Service: Orthopedics    TOE AMPUTATION Right     5th    TUBAL ABDOMINAL LIGATION         PT ASSESSMENT (last 12 hours)       IRF PT Evaluation and Treatment       Row Name 09/01/23 1202          PT Time and Intention    Document Type daily treatment  -AG     Mode of Treatment physical therapy  -AG     Patient/Family/Caregiver Comments/Observations pt. seated in w/c prior to treatment session; pt. is pleasant, alert, coopeative for participation; reports no significant complaints.  -AG       Row Name 09/01/23 1202          General Information    Patient Profile Reviewed yes  -AG     Existing Precautions/Restrictions fall  hx L patellar subluxation; peritoneal dialysis port R abdomen  -AG       Row Name 09/01/23 1202          Cognition/Psychosocial    Affect/Mental Status (Cognition) WFL  -     Orientation Status (Cognition) oriented x 3  -AG     Follows Commands (Cognition) verbal cues/prompting required  -AG     Personal Safety Interventions fall prevention program maintained;gait belt;nonskid shoes/slippers when out of bed;supervised activity  -AG     Cognitive Function WFL  -AG       Row Name 09/01/23 1202          Bed Mobility    Comment, (Bed Mobility) deferred; not observed by PT today  -       Row  Name 09/01/23 1202          Transfer Assessment/Treatment    Transfers toilet transfer;stand pivot/stand step transfer;sit-stand transfer;stand-sit transfer  -AG       Row Name 09/01/23 1202          Sit-Stand Transfer    Sit-Stand [place] (Transfers) verbal cues;nonverbal cues (demo/gesture);minimum assist (75% patient effort)  -AG     Assistive Device (Sit-Stand Transfers) walker, front-wheeled;wheelchair  -AG       Row Name 09/01/23 1202          Stand-Sit Transfer    Stand-Sit [place] (Transfers) verbal cues;nonverbal cues (demo/gesture);minimum assist (75% patient effort)  -AG     Assistive Device (Stand-Sit Transfers) walker, front-wheeled;wheelchair  -AG       Row Name 09/01/23 1202          Stand Pivot/Stand Step Transfer    Stand Pivot/Stand Step [place] (Transfers) verbal cues;nonverbal cues (demo/gesture);minimum assist (75% patient effort)  -AG     Assistive Device (Stand Pivot Stand Step Transfer) walker, front-wheeled  -AG       Row Name 09/01/23 1202          Toilet Transfer    Type (Toilet Transfer) stand pivot/stand step  -AG     [place] Level (Toilet Transfer) verbal cues;nonverbal cues (demo/gesture);minimum assist (75% patient effort)  -AG     Assistive Device (Toilet Transfer) commode;grab bars/safety frame;wheelchair  -AG       Row Name 09/01/23 1202          Gait/Stairs (Locomotion)    Gait/Stairs Locomotion gait/ambulation independence;gait/ambulation assistive device;distance ambulated;gait pattern;gait deviations  -AG     [place] Level (Gait) verbal cues;nonverbal cues (demo/gesture);minimum assist (75% patient effort)  -AG     Assistive Device (Gait) walker, front-wheeled  -AG     Distance in Feet (Gait) 60 ft x 2  -AG     Pattern (Gait) step-through  cues for incr stride length and to keep SHELIA inside walker  -AG     Deviations/Abnormal Patterns (Gait) stride length decreased;gait speed decreased;casa decreased  -AG     Bilateral Gait Deviations forward flexed  posture;heel strike decreased  -       Row Name 09/01/23 1202          Safety Issues, Functional Mobility    Impairments Affecting Function (Mobility) balance;endurance/activity tolerance;strength;pain  -AG       Row Name 09/01/23 1202          Balance    Static Standing Balance contact guard;minimal assist;non-verbal cues (demo/gesture);verbal cues  -     Dynamic Standing Balance verbal cues;non-verbal cues (demo/gesture);minimal assist  -     Position/Device Used, Standing Balance walker, front-wheeled  -       Row Name 09/01/23 1202          Motor Skills    Coordination gross motor deficit;bilateral;lower extremity;minimal impairment  -     Therapeutic Exercise knee;ankle;hip  -       Row Name 09/01/23 1202          Hip (Therapeutic Exercise)    Hip (Therapeutic Exercise) strengthening exercise  -     Hip Strengthening (Therapeutic Exercise) bilateral;flexion;extension;aBduction;aDduction;external rotation;internal rotation;marching while seated;sitting;resistance band;red  -       Row Name 09/01/23 1202          Knee (Therapeutic Exercise)    Knee (Therapeutic Exercise) PROM (passive range of motion);isometric exercises  -     Knee PROM (Therapeutic Exercise) bilateral;extension;sitting  long sitting/ hamstrings stretch  -     Knee Isometrics (Therapeutic Exercise) bilateral;quad sets;sitting  long sitting  -AG     Knee Strengthening (Therapeutic Exercise) bilateral;flexion;extension;marching while seated;LAQ (long arc quad);hamstring curls;sitting;resistance band;red  -       Row Name 09/01/23 1202          Ankle (Therapeutic Exercise)    Ankle (Therapeutic Exercise) PROM (passive range of motion)  -     Ankle PROM (Therapeutic Exercise) bilateral;dorsiflexion;sitting  long sitting  -AG     Ankle Strengthening (Therapeutic Exercise) bilateral;dorsiflexion;plantarflexion;sitting  -       Row Name 09/01/23 1202          Coping Strategies    Trust Relationship/Rapport care  explained;choices provided;thoughts/feelings acknowledged  -AG       Row Name 09/01/23 1202          Positioning and Restraints    Pre-Treatment Position sitting in chair/recliner  -AG     Post Treatment Position wheelchair  -AG     In Wheelchair sitting;with OT  -AG       Row Name 09/01/23 1202          Therapy Assessment/Plan (PT)    Patient's Goals For Discharge return home  -AG       Row Name 09/01/23 1202          Therapy Plan Review/Discharge Plan (PT)    Therapy Plan Review (PT) evaluation/treatment results reviewed;care plan/treatment goals reviewed;risks/benefits reviewed;current/potential barriers reviewed;participants voiced agreement with care plan;participants included;patient  -AG     Anticipated Equipment Needs at Discharge (PT Eval) front wheeled walker;wheelchair  -AG     Anticipated Discharge Disposition (PT) home with assist;home with home health  -AG               User Key  (r) = Recorded By, (t) = Taken By, (c) = Cosigned By      Initials Name Provider Type    AG Anu Chin, PT Physical Therapist                  Wound 08/23/23 1923 Right posterior heel (Active)   Dressing Appearance open to air 08/31/23 1910   Closure None 09/01/23 0725   Base non-blanchable;maroon/purple 09/01/23 0725   Drainage Amount none 09/01/23 0725   Care, Wound barrier applied;pressure removed 08/31/23 1910       Wound 08/23/23 2313 Left gluteal (Active)   Dressing Appearance open to air 08/31/23 1910   Closure None 09/01/23 0725   Base non-blanchable 09/01/23 0725   Periwound dry;non-blanchable 09/01/23 0725   Drainage Amount none 09/01/23 0725   Care, Wound barrier applied;pressure removed 08/31/23 1910     Physical Therapy Education       Title: PT OT SLP Therapies (Done)       Topic: Physical Therapy (Done)       Point: Mobility training (Done)       Learning Progress Summary             Patient Acceptance, E,D, VU,NR by AG at 9/1/2023 1201    Acceptance, TB, NR by KEYON at 8/30/2023 2120    Acceptance, E,TB, VU by  RM at 8/30/2023 1530    Acceptance, TB, NR by DL at 8/29/2023 2104    Acceptance, E,TB, VU by RM at 8/29/2023 1600    Acceptance, TB, NR by DL at 8/28/2023 2048    Acceptance, E,TB, VU,DU by HC at 8/28/2023 1451    Acceptance, D,E, VU,NR by RG at 8/28/2023 1436    Acceptance, TB, NR by DL at 8/27/2023 2309    Acceptance, E,D, VU,NR by RG at 8/26/2023 1522    Acceptance, E,D, VU,NR by LL at 8/25/2023 1525    Acceptance, E,D, VU,NR by RG at 8/25/2023 1433    Acceptance, E, VU,NR by LB at 8/24/2023 1149                         Point: Home exercise program (Done)       Learning Progress Summary             Patient Acceptance, E,D, VU,NR by AG at 9/1/2023 1201    Acceptance, TB, NR by DL at 8/30/2023 2120    Acceptance, E,TB, VU by RM at 8/30/2023 1530    Acceptance, TB, NR by DL at 8/29/2023 2104    Acceptance, E,TB, VU by RM at 8/29/2023 1600    Acceptance, TB, NR by DL at 8/28/2023 2048    Acceptance, E,TB, VU,DU by HC at 8/28/2023 1451    Acceptance, D,E, VU,NR by RG at 8/28/2023 1436    Acceptance, TB, NR by DL at 8/27/2023 2309    Acceptance, E,D, VU,NR by RG at 8/26/2023 1522    Acceptance, E,D, VU,NR by LL at 8/25/2023 1525    Acceptance, E,D, VU,NR by RG at 8/25/2023 1433    Acceptance, E, VU,NR by LB at 8/24/2023 1149                         Point: Body mechanics (Done)       Learning Progress Summary             Patient Acceptance, E,D, VU,NR by AG at 9/1/2023 1201    Acceptance, TB, NR by DL at 8/30/2023 2120    Acceptance, E,TB, VU by RM at 8/30/2023 1530    Acceptance, TB, NR by DL at 8/29/2023 2104    Acceptance, E,TB, VU by RM at 8/29/2023 1600    Acceptance, TB, NR by DL at 8/28/2023 2048    Acceptance, E,TB, VU,DU by HC at 8/28/2023 1451    Acceptance, D,E, VU,NR by RG at 8/28/2023 1436    Acceptance, TB, NR by DL at 8/27/2023 2309    Acceptance, E,D, VU,NR by RG at 8/26/2023 1522    Acceptance, E,D, VU,NR by LL at 8/25/2023 1525    Acceptance, E,D, VU,NR by RG at 8/25/2023 1433    Acceptance, E, VU,NR  by LB at 8/24/2023 1149                         Point: Precautions (Done)       Learning Progress Summary             Patient Acceptance, E,D, VU,NR by AG at 9/1/2023 1201    Acceptance, TB, NR by DL at 8/30/2023 2120    Acceptance, E,TB, VU by RM at 8/30/2023 1530    Acceptance, TB, NR by DL at 8/29/2023 2104    Acceptance, E,TB, VU by RM at 8/29/2023 1600    Acceptance, TB, NR by DL at 8/28/2023 2048    Acceptance, E,TB, VU,DU by HC at 8/28/2023 1451    Acceptance, D,E, VU,NR by RG at 8/28/2023 1436    Acceptance, TB, NR by DL at 8/27/2023 2309    Acceptance, E,D, VU,NR by RG at 8/26/2023 1522    Acceptance, E,D, VU,NR by LL at 8/25/2023 1525    Acceptance, E,D, VU,NR by RG at 8/25/2023 1433    Acceptance, E, VU,NR by LB at 8/24/2023 1149                                         User Key       Initials Effective Dates Name Provider Type Discipline    LB 06/16/21 -  Christel Ferguson, PT Physical Therapist PT    AG 06/16/21 -  Anu Chin, PT Physical Therapist PT    LL 05/02/16 -  Carly Lundberg, PTA Physical Therapist Assistant PT    RM 02/17/23 -  Patrizia Hope, PTA Physical Therapist Assistant PT    RG 06/16/21 -  Rajiv Pagan, PTA Physical Therapist Assistant PT    HC 01/20/23 -  Anaid Bo, PTA Physical Therapist Assistant PT    DL 03/16/22 -  Claudine Lundberg, RN Registered Nurse Nurse                    PT Recommendation and Plan       Anticipated Equipment Needs at Discharge (PT Eval): front wheeled walker, wheelchair                  Time Calculation:      PT Charges       Row Name 09/01/23 1201             Time Calculation    Start Time 0915  -AG      Stop Time 1045  -AG      Time Calculation (min) 90 min  -AG      PT Received On 09/01/23  -AG      PT - Next Appointment 09/04/23  -AG                User Key  (r) = Recorded By, (t) = Taken By, (c) = Cosigned By      Initials Name Provider Type    Anu Solorzano, PT Physical Therapist                    Therapy Charges for  Today       Code Description Service Date Service Provider Modifiers Qty    54322725222 HC GAIT TRAINING EA 15 MIN 9/1/2023 Anu Chin, PT GP 1    25680312945 HC PT THERAPEUTIC ACT EA 15 MIN 9/1/2023 Anu Chin, PT GP 1    10551552412 HC PT THER PROC EA 15 MIN 9/1/2023 Anu Chin, PT GP 4              PT G-Codes  AM-PAC 6 Clicks Score (PT): 17      Anu Chin, PT  9/1/2023

## 2023-09-01 NOTE — PROGRESS NOTES
CC: Follow-up on debility    Interview History/HPI: Patient states she is doing very well without new complaints.  I have noticed that her bowels have not moved.  She states she did have a fairly large bowel movement this AM.  No other complaints voiced except she felt shaky through the night, her glucose apparently dropped to 64, she was given some juice and this did come up.  She states as far she knows dialysis went well through the night.          Current Hospital Meds:  amLODIPine, 10 mg, Oral, Daily  aspirin, 81 mg, Oral, Daily  atorvastatin, 80 mg, Oral, Nightly  castor oil-balsam peru, 1 application , Topical, Q12H  FLUoxetine, 40 mg, Oral, Daily  fluticasone, 2 spray, Each Nare, Daily  insulin glargine, 10 Units, Subcutaneous, Daily  insulin lispro, 3-14 Units, Subcutaneous, TID With Meals  isosorbide mononitrate, 30 mg, Oral, Daily  levothyroxine, 50 mcg, Oral, Daily  metoprolol succinate XL, 25 mg, Oral, Daily  multivitamin, 1 tablet, Oral, Daily  pantoprazole, 40 mg, Oral, Daily  rOPINIRole, 0.5 mg, Oral, BID  traZODone, 50 mg, Oral, Nightly         Vitals:    09/01/23 0700   BP: 98/58   Pulse: 67   Resp: 16   Temp: 97.7 °F (36.5 °C)   SpO2: 94%         Intake/Output Summary (Last 24 hours) at 9/1/2023 1456  Last data filed at 9/1/2023 1300  Gross per 24 hour   Intake 1040 ml   Output 1027 ml   Net 13 ml       EXAM: Lungs are clear, heart regular rate and rhythm without murmur, abdomen is soft, benign mood is good strength is symmetric no edema skin warm and dry      Diet: Regular/House Diet, Diabetic Diets, Fluid Restriction (240 mL/tray) Diets; Consistent Carbohydrate; Other (Specify mL/day) (800 cc restriction daily); Texture: Soft to Chew (NDD 3); Soft to Chew: Chopped Meat; Fluid Consistency: Thin (IDDSI ...        LABS:     Lab Results (last 48 hours)       Procedure Component Value Units Date/Time    POC Glucose Once [[id number]]  (Abnormal) Collected: 09/01/23 1120    Specimen: Blood Updated:  09/01/23 1127     Glucose 211 mg/dL     POC Glucose Once [[id number]]  (Normal) Collected: 09/01/23 0633    Specimen: Blood Updated: 09/01/23 0639     Glucose 83 mg/dL     POC Glucose Once [[id number]]  (Abnormal) Collected: 09/01/23 0610    Specimen: Blood Updated: 09/01/23 0617     Glucose 64 mg/dL     POC Glucose Once [[id number]]  (Abnormal) Collected: 08/31/23 2011    Specimen: Blood Updated: 08/31/23 2017     Glucose 202 mg/dL     Platelet Count [[id number]]  (Normal) Collected: 08/31/23 1618    Specimen: Blood Updated: 08/31/23 1638     Platelets 165 10*3/mm3     POC Glucose Once [[id number]]  (Abnormal) Collected: 08/31/23 1554    Specimen: Blood Updated: 08/31/23 1605     Glucose 156 mg/dL     POC Glucose Once [[id number]]  (Abnormal) Collected: 08/31/23 1057    Specimen: Blood Updated: 08/31/23 1104     Glucose 218 mg/dL     POC Glucose Once [[id number]]  (Abnormal) Collected: 08/31/23 0611    Specimen: Blood Updated: 08/31/23 0626     Glucose 133 mg/dL     CBC & Differential [[id number]]  (Abnormal) Collected: 08/31/23 0239    Specimen: Blood Updated: 08/31/23 0325    Narrative:      The following orders were created for panel order CBC & Differential.  Procedure                               Abnormality         Status                     ---------                               -----------         ------                     CBC Auto Differential[[id number]]        Abnormal            Final result               Scan Slide[[id number]]                                       Final result                 Please view results for these tests on the individual orders.    Scan Slide [[id number]] Collected: 08/31/23 0239    Specimen: Blood Updated: 08/31/23 0325     Anisocytosis Slight/1+     Hypochromia Slight/1+     Macrocytes Slight/1+     Platelet Morphology Normal    Basic Metabolic Panel [[id number]]  (Abnormal) Collected: 08/31/23 0239    Specimen: Blood Updated: 08/31/23 0321     Glucose 166 mg/dL       mg/dL       Creatinine 5.12 mg/dL      Sodium 129 mmol/L      Potassium 4.9 mmol/L      Comment: Slight hemolysis detected by analyzer. Results may be affected.        Chloride 96 mmol/L      CO2 22.4 mmol/L      Calcium 7.7 mg/dL      BUN/Creatinine Ratio 20.5     Anion Gap 10.6 mmol/L      eGFR 8.8 mL/min/1.73      Comment: <15 Indicative of kidney failure       Narrative:      GFR Normal >60  Chronic Kidney Disease <60  Kidney Failure <15      CBC Auto Differential [[id number]]  (Abnormal) Collected: 08/31/23 0239    Specimen: Blood Updated: 08/31/23 0316     WBC 4.32 10*3/mm3      RBC 2.55 10*6/mm3      Hemoglobin 7.6 g/dL      Hematocrit 26.8 %      .1 fL      MCH 29.8 pg      MCHC 28.4 g/dL      RDW 14.5 %      RDW-SD 55.0 fl      MPV 9.9 fL      Platelets 100 10*3/mm3      Neutrophil % 53.9 %      Lymphocyte % 31.5 %      Monocyte % 8.1 %      Eosinophil % 5.6 %      Basophil % 0.7 %      Immature Grans % 0.2 %      Neutrophils, Absolute 2.33 10*3/mm3      Lymphocytes, Absolute 1.36 10*3/mm3      Monocytes, Absolute 0.35 10*3/mm3      Eosinophils, Absolute 0.24 10*3/mm3      Basophils, Absolute 0.03 10*3/mm3      Immature Grans, Absolute 0.01 10*3/mm3      nRBC 0.0 /100 WBC     POC Glucose Once [[id number]]  (Abnormal) Collected: 08/30/23 1957    Specimen: Blood Updated: 08/30/23 2007     Glucose 142 mg/dL                  Radiology:    Imaging Results (Last 72 Hours)       ** No results found for the last 72 hours. **            Results for orders placed during the hospital encounter of 08/19/23    Adult Transthoracic Echo Complete W/ Cont if Necessary Per Protocol    Interpretation Summary    Left ventricular systolic function is normal. Left ventricular ejection fraction appears to be 56 - 60%.    Left ventricular diastolic function is consistent with (grade I) impaired relaxation.    Left atrial volume is mildly increased.    Moderate mitral valve stenosis is present.    Estimated right ventricular systolic  pressure from tricuspid regurgitation is mildly elevated (35-45 mmHg).    Mild pulmonary hypertension is present.    There is no evidence of pericardial effusion.      Assessment/Plan:   Debility, status post prolonged hospitalization.  Patient is progressing well with occupational and physical therapy.  With OT, patient is set up for grooming, mod assist with toileting hygiene, min assist chair to bed, min assist toilet transfer, OT continues to work with ADLs, range of motion, strengthening, endurance, fine motor function and coordination.  With PT, patient is min assist for stand to sit and sit to stand, min assist for stand pivot, min assist toilet transfer.  Patient walked 60 feet x 2 min assist front wheel walker.    ESRD, continue dialysis per nephrology recommendations.    Diabetes, I have decreased her insulin to just a daily dosing of basal insulin and continuing sliding scale.  We will adjust further as needed but want to try to avoid hypoglycemia if possible.    History of hypertension with borderline low blood pressure last reading, patient did not receive her amlodipine per parameters, I have decreased this to 5 mg, placed parameters on the Toprol and will follow.    Hypothyroidism, continue current Synthroid dosing, this needs to be rechecked in approximate 4 weeks.  Synthroid was adjusted here this week.    DVT prophylaxis, SCUDs, subcu heparin was stopped secondary to decreased platelets, platelet count however on recheck was normal.  This was probably spurious, recheck again in a.m. and if stable may consider restarting subcu heparin    Hyponatremia, recheck in a.m.    Andrey Spence MD

## 2023-09-01 NOTE — PROGRESS NOTES
Occupational Therapy:    Physical Therapy: Individual: 90 minutes.    Speech Language Pathology:    Signed by: Anu Chin, Physical Therapist

## 2023-09-01 NOTE — PROGRESS NOTES
Occupational Therapy: Individual: 100 minutes.    Physical Therapy:    Speech Language Pathology:    Signed by: Olivia Rahman OT

## 2023-09-01 NOTE — PROGRESS NOTES
Adult Nutrition  Assessment/PES    Patient Name:  Reny Elena  YOB: 1960  MRN: [id number]  Admit Date:  8/23/2023    Assessment Date:  9/1/2023    Comments:  Patient eating 95% of past 5 meals.  Has ONS ordered. Will discontinue ONS related to intakes meeting estimated needs and BG levels elevated.  Will continue to follow.     Reason for Assessment       Row Name 09/01/23 1214          Reason for Assessment    Reason For Assessment follow-up protocol                      Labs/Tests/Procedures/Meds       Row Name 09/01/23 1215          Labs/Procedures/Meds    Lab Results Reviewed reviewed     Lab Results Comments glu 211, bun-105; CR5.12        Medications    Pertinent Medications Reviewed reviewed                        Nutrition Prescription Ordered       Row Name 09/01/23 1221          Nutrition Prescription PO    Current PO Diet Regular     Fluid Consistency Thin     Supplement Novasource Renal (Nepro);Mahesh     Supplement Frequency 2 times a day;Daily  Mahesh BID and Novosource daily     Common Modifiers Consistent Carbohydrate                    Evaluation of Received Nutrient/Fluid Intake       Row Name 09/01/23 1230          Fluid Intake Evaluation    Total Fluid Target (mL) 800     Oral Fluid (mL) 360     Enteral Fluid (mL) 0     Parenteral Fluid (mL) 0     Other Fluid (mL) 0     Total Fluid Intake (mL) 360     % Total Fluid Intake 45        PO Evaluation    Number of Meals 5     % PO Intake 95                       Problem/Interventions:   Problem 1       Row Name 09/01/23 1234          Nutrition Diagnoses Problem 1    Problem 1 Nutrition Appropriate for Condition at this Time     Macronutrient Carbohydrate     Etiology (related to) Medical Diagnosis     Endocrine DM     Renal Peritoneal dialysis     Signs/Symptoms (evidenced by) Report/Observation     Reported/Observed By Patient;RN;MD     Appetite Good;Improved                          Intervention Goal       Row Name 09/01/23 1236           Intervention Goal    General Meet nutritional needs for age/condition     PO Maintain intake                    Nutrition Intervention       Row Name 09/01/23 1238          Nutrition Intervention    RD/Tech Action Follow Tx progress;Supplement provided                      Education/Evaluation       Row Name 09/01/23 1245          Monitor/Evaluation    Monitor Per protocol;PO intake;Supplement intake;Pertinent labs;Weight;Skin status     Education Follow-up Other (comment)  will continue to follow                     Electronically signed by:  Lory Jeff RD  09/01/23 12:50 EDT

## 2023-09-01 NOTE — THERAPY TREATMENT NOTE
Inpatient Rehabilitation - Occupational Therapy Treatment Note    [facility]     Patient Name: Reny Elena  : 1960  MRN: [id number]    Today's Date: 2023                 Admit Date: 2023       No diagnosis found.    Patient Active Problem List   Diagnosis    Tibia/fibula fracture    Iron deficiency anemia    Type 2 diabetes mellitus with hyperglycemia, with long-term current use of insulin    Wound of right ankle    Cellulitis    Metabolic encephalopathy    History of non-ST elevation myocardial infarction (NSTEMI)    HTN (hypertension)    CVA (cerebral vascular accident)    Chronic diastolic CHF (congestive heart failure)    Decubitus ulcer of coccyx, unspecified pressure ulcer stage    Depression    Expressive aphasia    Impaired mobility and ADLs    Hyperkalemia    Subdural hematoma    Severe malnutrition    Cerebrovascular accident (CVA), unspecified mechanism    PRES (posterior reversible encephalopathy syndrome)    Debility       Past Medical History:   Diagnosis Date    Arthritis     Closed fracture of right fibula and tibia 2018    Depression     Diabetic ulcer of left foot associated with type 2 diabetes mellitus 2017    Diastolic dysfunction     Disease of thyroid gland     Elevated cholesterol     End stage renal disease     Essential hypertension     GERD (gastroesophageal reflux disease)     History of transfusion     Hyperlipidemia     Iron deficiency anemia 2018    PAD (peripheral artery disease)     Renal failure     Type 2 diabetes mellitus with hyperglycemia, with long-term current use of insulin 2018       Past Surgical History:   Procedure Laterality Date    ABDOMINAL SURGERY      BACK SURGERY      Post spinal diskectomy, osteophytectomy in one lumbar interspace    CATARACT EXTRACTION      Left      SECTION      DENTAL PROCEDURE      ENDOSCOPY      FRACTURE SURGERY      right leg    HYSTERECTOMY      INCISION AND DRAINAGE LEG Left 4/15/2017     Procedure: INCISION AND DRAINAGE LOWER EXTREMITY;  Surgeon: Basilio Morris MD;  Location:  [facility] OR;  Service:     INCISION AND DRAINAGE LEG Left 5/26/2017    Procedure: Irrigation and Debridment abcess diabetic wound left foot with deep culture;  Surgeon: Basilio Morris MD;  Location: [facility];  Service:     INSERTION PERITONEAL DIALYSIS CATHETER N/A 12/16/2021    Procedure: INSERTION PERITONEAL DIALYSIS CATHETER LAPAROSCOPIC;  Surgeon: Edy Glover MD;  Location: [facility] OR;  Service: General;  Laterality: N/A;    KNEE ARTHROSCOPY Right     ORIF TIBIA FRACTURE Right 12/28/2018    Procedure: TIBIAL  FRACTURE OPEN REDUCTION INTERNAL FIXATION;  Surgeon: Jung Barragan MD;  Location: [facility];  Service: Orthopedics    TOE AMPUTATION Right     5th    TUBAL ABDOMINAL LIGATION               IRF OT ASSESSMENT FLOWSHEET (last 12 hours)       IRF OT Evaluation and Treatment       Row Name 09/01/23 1256          OT Time and Intention    Document Type daily treatment  -TM     Mode of Treatment occupational therapy  -TM     Patient Effort good  -TM     Symptoms Noted During/After Treatment other (see comments)  headache at beginning of 2nd tx session with RN notified/addressing  -TM       Row Name 09/01/23 1253          General Information    General Observations of Patient Pt agreeable for therapy.  -TM     Existing Precautions/Restrictions fall;other (see comments)  peritoneal dialysis port at ABD, abdominal binder, L patella subluxation  -TM       Row Name 09/01/23 1253          Cognition/Psychosocial    Orientation Status (Cognition) oriented x 3  -TM     Follows Commands (Cognition) follows one-step commands;verbal cues/prompting required  -TM       Row Name 09/01/23 1253          Grooming    [place] Level (Grooming) set up  -TM     Position (Grooming) supported sitting  -TM       Row Name 09/01/23 1253          Toileting    [place] Level (Toileting) moderate assist (50% patient effort);verbal cues  -TM      Assistive Device Use (Toileting) raised toilet seat;grab bar/safety frame  -     Position (Toileting) supported sitting  -TM       Row Name 09/01/23 1253          Chair-Bed Transfer    Chair-Bed [place] (Transfers) minimum assist (75% patient effort);verbal cues  -TM     Assistive Device (Chair-Bed Transfers) wheelchair  -TM       Row Name 09/01/23 1253          Toilet Transfer    Type (Toilet Transfer) stand pivot/stand step  -TM     [place] Level (Toilet Transfer) minimum assist (75% patient effort);verbal cues  -TM     Assistive Device (Toilet Transfer) wheelchair;grab bars/safety frame;raised toilet seat  -TM       Row Name 09/01/23 1253          Motor Skills    Motor Skills coordination;functional endurance;motor control/coordination interventions  -     Motor Control/Coordination Interventions therapeutic exercise/ROM;fine motor manipulation/dexterity activities;gross motor coordination activities;other (see comments)  BUE ther ex/act, GMC/FMC, bilateral coord  -TM               User Key  (r) = Recorded By, (t) = Taken By, (c) = Cosigned By      Initials Name Effective Dates     Olivia Rahman, OT 06/16/21 -                      Occupational Therapy Education       Title: PT OT SLP Therapies (Done)       Topic: Occupational Therapy (Done)       Point: ADL training (Done)       Description:   Instruct learner(s) on proper safety adaptation and remediation techniques during self care or transfers.   Instruct in proper use of assistive devices.                  Learning Progress Summary             Patient Acceptance, E,D, VU,NR by TM at 9/1/2023 1251    Acceptance, E, VU,NR by BF at 8/31/2023 1430    Acceptance, E, NR by BF at 8/30/2023 1235    Acceptance, TB, NR by DL at 8/29/2023 2104    Acceptance, E, VU,NR by BF at 8/29/2023 1447    Acceptance, TB, NR by DL at 8/28/2023 2048    Acceptance, E,D, NR,VU by CJ at 8/28/2023 1505    Acceptance, TB, NR by DL at 8/27/2023 2300     Acceptance, E, VU,NR by HB at 8/26/2023 1417    Acceptance, E, VU,NR by BF at 8/25/2023 1438    Acceptance, E, VU,NR by BF at 8/24/2023 1450                         Point: Precautions (Done)       Description:   Instruct learner(s) on prescribed precautions during self-care and functional transfers.                  Learning Progress Summary             Patient Acceptance, E,D, VU,NR by TM at 9/1/2023 1251    Acceptance, E, VU,NR by BF at 8/31/2023 1430    Acceptance, E, NR by BF at 8/30/2023 1235    Acceptance, TB, NR by DL at 8/29/2023 2104    Acceptance, E, VU,NR by BF at 8/29/2023 1447    Acceptance, TB, NR by DL at 8/28/2023 2048    Acceptance, E,D, NR,VU by CJ at 8/28/2023 1505    Acceptance, TB, NR by DL at 8/27/2023 2309    Acceptance, E, VU,NR by HB at 8/26/2023 1417    Acceptance, E, VU,NR by BF at 8/25/2023 1438    Acceptance, E, VU,NR by BF at 8/24/2023 1450                                         User Key       Initials Effective Dates Name Provider Type Discipline    BF 07/11/23 -  Yael Mendez, OT Occupational Therapist OT    CJ 06/16/21 -  Nichelle Lagunas, OT Occupational Therapist OT    TM 06/16/21 -  Olivia Rahman, OT Occupational Therapist OT    HB 05/25/21 -  Ella Wallace OT Occupational Therapist OT    DL 03/16/22 -  Claudine Lundberg, RN Registered Nurse Nurse                        OT Recommendation and Plan                         Time Calculation:      Time Calculation- OT       Row Name 09/01/23 1252 09/01/23 1251          Time Calculation- OT    OT Start Time 1045  -TM 0820  -TM     OT Stop Time 1130  -TM 0915  -TM     OT Time Calculation (min) 45 min  -TM 55 min  -TM     Total Timed Code Minutes- OT 45 minute(s)  -TM 55 minute(s)  -TM     OT Non-Billable Time (min) -- 15 min  -TM               User Key  (r) = Recorded By, (t) = Taken By, (c) = Cosigned By      Initials Name Provider Type    TM Olivia Rahman, OT Occupational Therapist                  Therapy  Charges for Today       Code Description Service Date Service Provider Modifiers Qty    47209496197 HC OT SELF CARE/MGMT/TRAIN EA 15 MIN 9/1/2023 Olivia Rahman, KORINA GO 2    [id number]  OT THERAPEUTIC ACT EA 15 MIN 9/1/2023 Olivia Rahman OT GO 3    [id number]  OT THER PROC EA 15 MIN 9/1/2023 Olivia Rahman OT GO 2                     Olivia Rahman OT  9/1/2023

## 2023-09-02 LAB
ANION GAP SERPL CALCULATED.3IONS-SCNC: 12 MMOL/L (ref 5–15)
BASOPHILS # BLD AUTO: 0.03 10*3/MM3 (ref 0–0.2)
BASOPHILS NFR BLD AUTO: 0.6 % (ref 0–1.5)
BUN SERPL-MCNC: 109 MG/DL (ref 8–23)
BUN/CREAT SERPL: 21.1 (ref 7–25)
CALCIUM SPEC-SCNC: 8 MG/DL (ref 8.6–10.5)
CHLORIDE SERPL-SCNC: 95 MMOL/L (ref 98–107)
CO2 SERPL-SCNC: 24 MMOL/L (ref 22–29)
CREAT SERPL-MCNC: 5.17 MG/DL (ref 0.57–1)
DEPRECATED RDW RBC AUTO: 50.6 FL (ref 37–54)
EGFRCR SERPLBLD CKD-EPI 2021: 8.7 ML/MIN/1.73
EOSINOPHIL # BLD AUTO: 0.28 10*3/MM3 (ref 0–0.4)
EOSINOPHIL NFR BLD AUTO: 6 % (ref 0.3–6.2)
ERYTHROCYTE [DISTWIDTH] IN BLOOD BY AUTOMATED COUNT: 14.3 % (ref 12.3–15.4)
GLUCOSE BLDC GLUCOMTR-MCNC: 204 MG/DL (ref 70–130)
GLUCOSE BLDC GLUCOMTR-MCNC: 205 MG/DL (ref 70–130)
GLUCOSE BLDC GLUCOMTR-MCNC: 268 MG/DL (ref 70–130)
GLUCOSE SERPL-MCNC: 235 MG/DL (ref 65–99)
HCT VFR BLD AUTO: 26.3 % (ref 34–46.6)
HGB BLD-MCNC: 8 G/DL (ref 12–15.9)
IMM GRANULOCYTES # BLD AUTO: 0.03 10*3/MM3 (ref 0–0.05)
IMM GRANULOCYTES NFR BLD AUTO: 0.6 % (ref 0–0.5)
LYMPHOCYTES # BLD AUTO: 1.06 10*3/MM3 (ref 0.7–3.1)
LYMPHOCYTES NFR BLD AUTO: 22.8 % (ref 19.6–45.3)
MCH RBC QN AUTO: 29.6 PG (ref 26.6–33)
MCHC RBC AUTO-ENTMCNC: 30.4 G/DL (ref 31.5–35.7)
MCV RBC AUTO: 97.4 FL (ref 79–97)
MONOCYTES # BLD AUTO: 0.31 10*3/MM3 (ref 0.1–0.9)
MONOCYTES NFR BLD AUTO: 6.7 % (ref 5–12)
NEUTROPHILS NFR BLD AUTO: 2.94 10*3/MM3 (ref 1.7–7)
NEUTROPHILS NFR BLD AUTO: 63.3 % (ref 42.7–76)
NRBC BLD AUTO-RTO: 0 /100 WBC (ref 0–0.2)
PLATELET # BLD AUTO: 147 10*3/MM3 (ref 140–450)
PMV BLD AUTO: 10.5 FL (ref 6–12)
POTASSIUM SERPL-SCNC: 4.8 MMOL/L (ref 3.5–5.2)
RBC # BLD AUTO: 2.7 10*6/MM3 (ref 3.77–5.28)
SODIUM SERPL-SCNC: 131 MMOL/L (ref 136–145)
WBC NRBC COR # BLD: 4.65 10*3/MM3 (ref 3.4–10.8)

## 2023-09-02 PROCEDURE — 25010000002 HEPARIN (PORCINE) PER 1000 UNITS: Performed by: INTERNAL MEDICINE

## 2023-09-02 PROCEDURE — 63710000001 INSULIN GLARGINE PER 5 UNITS: Performed by: INTERNAL MEDICINE

## 2023-09-02 PROCEDURE — 82948 REAGENT STRIP/BLOOD GLUCOSE: CPT

## 2023-09-02 PROCEDURE — 63710000001 INSULIN LISPRO (HUMAN) PER 5 UNITS: Performed by: INTERNAL MEDICINE

## 2023-09-02 PROCEDURE — 80048 BASIC METABOLIC PNL TOTAL CA: CPT | Performed by: INTERNAL MEDICINE

## 2023-09-02 PROCEDURE — 85025 COMPLETE CBC W/AUTO DIFF WBC: CPT | Performed by: INTERNAL MEDICINE

## 2023-09-02 PROCEDURE — 99232 SBSQ HOSP IP/OBS MODERATE 35: CPT | Performed by: INTERNAL MEDICINE

## 2023-09-02 RX ORDER — HEPARIN SODIUM 5000 [USP'U]/ML
5000 INJECTION, SOLUTION INTRAVENOUS; SUBCUTANEOUS EVERY 12 HOURS SCHEDULED
Status: DISCONTINUED | OUTPATIENT
Start: 2023-09-02 | End: 2023-09-07 | Stop reason: HOSPADM

## 2023-09-02 RX ADMIN — HEPARIN SODIUM 5000 UNITS: 5000 INJECTION INTRAVENOUS; SUBCUTANEOUS at 21:00

## 2023-09-02 RX ADMIN — HYDROCODONE BITARTRATE AND ACETAMINOPHEN 1 TABLET: 10; 325 TABLET ORAL at 11:00

## 2023-09-02 RX ADMIN — INSULIN LISPRO 5 UNITS: 100 INJECTION, SOLUTION INTRAVENOUS; SUBCUTANEOUS at 11:24

## 2023-09-02 RX ADMIN — CASTOR OIL AND BALSAM, PERU 1 APPLICATION: 788; 87 OINTMENT TOPICAL at 21:16

## 2023-09-02 RX ADMIN — Medication 1 TABLET: at 08:20

## 2023-09-02 RX ADMIN — TRAZODONE HYDROCHLORIDE 50 MG: 50 TABLET ORAL at 21:00

## 2023-09-02 RX ADMIN — HYDROCODONE BITARTRATE AND ACETAMINOPHEN 1 TABLET: 10; 325 TABLET ORAL at 21:14

## 2023-09-02 RX ADMIN — INSULIN LISPRO 5 UNITS: 100 INJECTION, SOLUTION INTRAVENOUS; SUBCUTANEOUS at 08:16

## 2023-09-02 RX ADMIN — ASPIRIN 81 MG: 81 TABLET, COATED ORAL at 08:20

## 2023-09-02 RX ADMIN — ROPINIROLE HYDROCHLORIDE 0.5 MG: 0.25 TABLET, FILM COATED ORAL at 21:00

## 2023-09-02 RX ADMIN — FLUOXETINE HYDROCHLORIDE 40 MG: 20 CAPSULE ORAL at 08:19

## 2023-09-02 RX ADMIN — INSULIN LISPRO 8 UNITS: 100 INJECTION, SOLUTION INTRAVENOUS; SUBCUTANEOUS at 17:52

## 2023-09-02 RX ADMIN — PANTOPRAZOLE SODIUM 40 MG: 40 TABLET, DELAYED RELEASE ORAL at 08:20

## 2023-09-02 RX ADMIN — ATORVASTATIN CALCIUM 80 MG: 40 TABLET, FILM COATED ORAL at 21:00

## 2023-09-02 RX ADMIN — ISOSORBIDE MONONITRATE 30 MG: 30 TABLET, EXTENDED RELEASE ORAL at 08:20

## 2023-09-02 RX ADMIN — FLUTICASONE PROPIONATE 2 SPRAY: 50 SPRAY, METERED NASAL at 08:17

## 2023-09-02 RX ADMIN — ROPINIROLE HYDROCHLORIDE 0.5 MG: 0.25 TABLET, FILM COATED ORAL at 08:18

## 2023-09-02 RX ADMIN — TIZANIDINE 4 MG: 4 TABLET ORAL at 21:00

## 2023-09-02 RX ADMIN — INSULIN GLARGINE 10 UNITS: 100 INJECTION, SOLUTION SUBCUTANEOUS at 08:15

## 2023-09-02 RX ADMIN — LEVOTHYROXINE SODIUM 50 MCG: 50 TABLET ORAL at 08:19

## 2023-09-02 RX ADMIN — HYDROXYZINE HYDROCHLORIDE 12.5 MG: 25 TABLET, FILM COATED ORAL at 21:00

## 2023-09-02 RX ADMIN — CASTOR OIL AND BALSAM, PERU 1 APPLICATION: 788; 87 OINTMENT TOPICAL at 08:25

## 2023-09-02 RX ADMIN — AMLODIPINE BESYLATE 5 MG: 5 TABLET ORAL at 08:19

## 2023-09-02 RX ADMIN — HEPARIN SODIUM 5000 UNITS: 5000 INJECTION INTRAVENOUS; SUBCUTANEOUS at 11:23

## 2023-09-02 RX ADMIN — METOPROLOL SUCCINATE 25 MG: 25 TABLET, EXTENDED RELEASE ORAL at 08:18

## 2023-09-02 NOTE — PROGRESS NOTES
Nephrology Progress Note    Interval History:     Patient Complaints: No major complaints.  Says she is not as dizzy as she used to be when she stands but still experiences occasionally.  Eating better.  Edematous.  Noted comments by Dr. Spence regarding the hypothyroidism and the diabetes as well as the high blood pressure.        Vital Signs  Temp:  [97.8 °F (36.6 °C)-98.4 °F (36.9 °C)] 97.8 °F (36.6 °C)  Heart Rate:  [80-83] 80  Resp:  [18] 18  BP: (148-190)/(69-86) 161/76    Physical Exam:    General:           Cachectic      HEENT: Pale               Neck: No JVD       Lungs:   Clear   Heart:  RRR,  no rub       Abdomen:   Normal bowel sounds, soft non-tender, non-distended, no guarding       Extremities: 2-3+ edema                        Results Review:    I reviewed the patient's new clinical results.    Lab Results (last 24 hours)       Procedure Component Value Units Date/Time    POC Glucose Once [[id number]]  (Abnormal) Collected: 09/02/23 1111    Specimen: Blood Updated: 09/02/23 1117     Glucose 204 mg/dL     POC Glucose Once [[id number]]  (Abnormal) Collected: 09/02/23 0608    Specimen: Blood Updated: 09/02/23 0614     Glucose 205 mg/dL     Basic Metabolic Panel [[id number]]  (Abnormal) Collected: 09/02/23 0100    Specimen: Blood Updated: 09/02/23 0225     Glucose 235 mg/dL       mg/dL      Creatinine 5.17 mg/dL      Sodium 131 mmol/L      Potassium 4.8 mmol/L      Chloride 95 mmol/L      CO2 24.0 mmol/L      Calcium 8.0 mg/dL      BUN/Creatinine Ratio 21.1     Anion Gap 12.0 mmol/L      eGFR 8.7 mL/min/1.73      Comment: <15 Indicative of kidney failure       Narrative:      GFR Normal >60  Chronic Kidney Disease <60  Kidney Failure <15      CBC & Differential [[id number]]  (Abnormal) Collected: 09/02/23 0100    Specimen: Blood Updated: 09/02/23 0212    Narrative:      The following orders were created for panel order CBC &  Differential.  Procedure                               Abnormality         Status                     ---------                               -----------         ------                     CBC Auto Differential[[id number]]        Abnormal            Final result                 Please view results for these tests on the individual orders.    CBC Auto Differential [[id number]]  (Abnormal) Collected: 09/02/23 0100    Specimen: Blood Updated: 09/02/23 0212     WBC 4.65 10*3/mm3      RBC 2.70 10*6/mm3      Hemoglobin 8.0 g/dL      Hematocrit 26.3 %      MCV 97.4 fL      MCH 29.6 pg      MCHC 30.4 g/dL      RDW 14.3 %      RDW-SD 50.6 fl      MPV 10.5 fL      Platelets 147 10*3/mm3      Neutrophil % 63.3 %      Lymphocyte % 22.8 %      Monocyte % 6.7 %      Eosinophil % 6.0 %      Basophil % 0.6 %      Immature Grans % 0.6 %      Neutrophils, Absolute 2.94 10*3/mm3      Lymphocytes, Absolute 1.06 10*3/mm3      Monocytes, Absolute 0.31 10*3/mm3      Eosinophils, Absolute 0.28 10*3/mm3      Basophils, Absolute 0.03 10*3/mm3      Immature Grans, Absolute 0.03 10*3/mm3      nRBC 0.0 /100 WBC     POC Glucose Once [[id number]]  (Abnormal) Collected: 09/01/23 1940    Specimen: Blood Updated: 09/01/23 1946     Glucose 243 mg/dL             Imaging Results (Last 24 Hours)       ** No results found for the last 24 hours. **            Assessment and Plan:    1.  End-stage renal disease on peritoneal dialysis  2.  Type 2 diabetes with nephropathy  3.  New onset hypothyroidism being addressed by Dr. Spence  4.  Hypertension  5.  Malnutrition  6.  Anasarca    We will go ahead and do more ultrafiltration next to 3 days but we will keep a watch on orthostatics and will increase the protein supplement to twice a day  When we do dialysis today we will do 2 bags of 2.5% each and this was discussed with the dialysis nurse    Han Sandy MD  09/02/23  16:31 EDT

## 2023-09-02 NOTE — PROGRESS NOTES
CC: Follow-up on debility    Interview History/HPI: Patient is complaining of her right leg hurting some and feeling a little weaker today.  She has had multiple fractures in that leg before.  She states the weakness feels like it is is from the knee down, she noticed it when she got up to the bedside commode but this is not new for her, has been going on intermittently a prolonged period of time.  She has no paresthesias or upper extremity weakness, she is swallowing without difficulty.  No other complaints          Current Hospital Meds:  amLODIPine, 5 mg, Oral, Daily  aspirin, 81 mg, Oral, Daily  atorvastatin, 80 mg, Oral, Nightly  castor oil-balsam peru, 1 application , Topical, Q12H  FLUoxetine, 40 mg, Oral, Daily  fluticasone, 2 spray, Each Nare, Daily  insulin glargine, 10 Units, Subcutaneous, Daily  insulin lispro, 3-14 Units, Subcutaneous, TID With Meals  isosorbide mononitrate, 30 mg, Oral, Daily  levothyroxine, 50 mcg, Oral, Daily  metoprolol succinate XL, 25 mg, Oral, Daily  multivitamin, 1 tablet, Oral, Daily  pantoprazole, 40 mg, Oral, Daily  rOPINIRole, 0.5 mg, Oral, BID  traZODone, 50 mg, Oral, Nightly         Vitals:    09/02/23 0700   BP: (!) 190/86   Pulse: 83   Resp: 18   Temp: 97.8 °F (36.6 °C)   SpO2: 100%         Intake/Output Summary (Last 24 hours) at 9/2/2023 1058  Last data filed at 9/2/2023 0900  Gross per 24 hour   Intake 680 ml   Output 930 ml   Net -250 ml       EXAM: Upper extremity strength is symmetric, she can lift both arms above her head, speech is normal, she has no distress skin warm and dry lungs are clear, heart regular rate and rhythm, she has some weakness with dorsiflexion and plantarflexion bilaterally right may be slightly worse than the left, she can lift both legs off the bed.  Trace edema noted only.  No skin rashes erythema or tenderness noted around the right knee or below.      Diet: Regular/House Diet, Diabetic Diets, Fluid Restriction (240 mL/tray) Diets;  Consistent Carbohydrate; Other (Specify mL/day) (800 cc restriction daily); Texture: Soft to Chew (NDD 3); Soft to Chew: Chopped Meat; Fluid Consistency: Thin (IDDSI ...        LABS:     Lab Results (last 48 hours)       Procedure Component Value Units Date/Time    POC Glucose Once [[id number]]  (Abnormal) Collected: 09/02/23 0608    Specimen: Blood Updated: 09/02/23 0614     Glucose 205 mg/dL     Basic Metabolic Panel [[id number]]  (Abnormal) Collected: 09/02/23 0100    Specimen: Blood Updated: 09/02/23 0225     Glucose 235 mg/dL       mg/dL      Creatinine 5.17 mg/dL      Sodium 131 mmol/L      Potassium 4.8 mmol/L      Chloride 95 mmol/L      CO2 24.0 mmol/L      Calcium 8.0 mg/dL      BUN/Creatinine Ratio 21.1     Anion Gap 12.0 mmol/L      eGFR 8.7 mL/min/1.73      Comment: <15 Indicative of kidney failure       Narrative:      GFR Normal >60  Chronic Kidney Disease <60  Kidney Failure <15      CBC & Differential [[id number]]  (Abnormal) Collected: 09/02/23 0100    Specimen: Blood Updated: 09/02/23 0212    Narrative:      The following orders were created for panel order CBC & Differential.  Procedure                               Abnormality         Status                     ---------                               -----------         ------                     CBC Auto Differential[[id number]]        Abnormal            Final result                 Please view results for these tests on the individual orders.    CBC Auto Differential [[id number]]  (Abnormal) Collected: 09/02/23 0100    Specimen: Blood Updated: 09/02/23 0212     WBC 4.65 10*3/mm3      RBC 2.70 10*6/mm3      Hemoglobin 8.0 g/dL      Hematocrit 26.3 %      MCV 97.4 fL      MCH 29.6 pg      MCHC 30.4 g/dL      RDW 14.3 %      RDW-SD 50.6 fl      MPV 10.5 fL      Platelets 147 10*3/mm3      Neutrophil % 63.3 %      Lymphocyte % 22.8 %      Monocyte % 6.7 %      Eosinophil % 6.0 %      Basophil % 0.6 %      Immature Grans % 0.6 %       Neutrophils, Absolute 2.94 10*3/mm3      Lymphocytes, Absolute 1.06 10*3/mm3      Monocytes, Absolute 0.31 10*3/mm3      Eosinophils, Absolute 0.28 10*3/mm3      Basophils, Absolute 0.03 10*3/mm3      Immature Grans, Absolute 0.03 10*3/mm3      nRBC 0.0 /100 WBC     POC Glucose Once [[id number]]  (Abnormal) Collected: 09/01/23 1940    Specimen: Blood Updated: 09/01/23 1946     Glucose 243 mg/dL     POC Glucose Once [[id number]]  (Abnormal) Collected: 09/01/23 1540    Specimen: Blood Updated: 09/01/23 1552     Glucose 222 mg/dL     POC Glucose Once [[id number]]  (Abnormal) Collected: 09/01/23 1120    Specimen: Blood Updated: 09/01/23 1127     Glucose 211 mg/dL     POC Glucose Once [[id number]]  (Normal) Collected: 09/01/23 0633    Specimen: Blood Updated: 09/01/23 0639     Glucose 83 mg/dL     POC Glucose Once [[id number]]  (Abnormal) Collected: 09/01/23 0610    Specimen: Blood Updated: 09/01/23 0617     Glucose 64 mg/dL     POC Glucose Once [[id number]]  (Abnormal) Collected: 08/31/23 2011    Specimen: Blood Updated: 08/31/23 2017     Glucose 202 mg/dL     Platelet Count [[id number]]  (Normal) Collected: 08/31/23 1618    Specimen: Blood Updated: 08/31/23 1638     Platelets 165 10*3/mm3     POC Glucose Once [[id number]]  (Abnormal) Collected: 08/31/23 1554    Specimen: Blood Updated: 08/31/23 1605     Glucose 156 mg/dL     POC Glucose Once [[id number]]  (Abnormal) Collected: 08/31/23 1057    Specimen: Blood Updated: 08/31/23 1104     Glucose 218 mg/dL                  Radiology:    Imaging Results (Last 72 Hours)       ** No results found for the last 72 hours. **            Results for orders placed during the hospital encounter of 08/19/23    Adult Transthoracic Echo Complete W/ Cont if Necessary Per Protocol    Interpretation Summary    Left ventricular systolic function is normal. Left ventricular ejection fraction appears to be 56 - 60%.    Left ventricular diastolic function is consistent with (grade I) impaired  relaxation.    Left atrial volume is mildly increased.    Moderate mitral valve stenosis is present.    Estimated right ventricular systolic pressure from tricuspid regurgitation is mildly elevated (35-45 mmHg).    Mild pulmonary hypertension is present.    There is no evidence of pericardial effusion.      Assessment/Plan:   Debility, patient is undergoing occupational physical therapy.  For progression, see my note from yesterday.  Patient is not having therapy sessions today.  Patient did walk 60 feet x 2 yesterday, she is overall min assist for transfers.  Planned Home date is 9/7.    Hypertension, blood pressure was significant elevated this a.m., 190/86 however repeat at this time after medication 161/76.  Noted yesterday a.m. it was 98/58, she seems to have very wide swings.  We will continue to monitor on current medication.    Perceived below the knee right lower extremity weakness, again this has been intermittent with she states and she says she is having some pain although she does not feel like she has a new fracture, she was requesting a Norco which was given as per her schedule, I do not see anything that suggest new neurologic deficit.    ESRD, continue PD    Hyponatremia, stable    Diabetes, not well controlled, Lantus increased to 15, follow.    Thrombocytopenia, remains resolved, I think the reading 2 days ago was spurious, patient is significant risk for DVT, I am reinstating subcu heparin    Profound hypothyroidism, continue Synthroid at current dosing, the dose was increased earlier in the week, this can be followed up in about 4 weeks as an outpatient.    Remote history of CVA, continue aspirin and statin    HFpEF, compensated, overall being compensated by PD.    Andrey Spence MD

## 2023-09-02 NOTE — PLAN OF CARE
Goal Outcome Evaluation:  Plan of Care Reviewed With: patient        Progress: improving            Problem: Rehabilitation (IRF) Plan of Care  Goal: Plan of Care Review  Outcome: Ongoing, Progressing  Flowsheets (Taken 9/2/2023 1251)  Progress: improving  Plan of Care Reviewed With: patient  Goal: Patient-Specific Goal (Individualized)  Outcome: Ongoing, Progressing  Goal: Absence of New-Onset Illness or Injury  Outcome: Ongoing, Progressing  Intervention: Prevent Fall and Fall Injury  Recent Flowsheet Documentation  Taken 9/2/2023 1202 by Ramos Denny, RN  Safety Promotion/Fall Prevention: safety round/check completed  Taken 9/2/2023 1003 by Ramos Denny, RN  Safety Promotion/Fall Prevention: safety round/check completed  Taken 9/2/2023 0725 by Ramos Denny, RN  Safety Promotion/Fall Prevention:   safety round/check completed   nonskid shoes/slippers when out of bed   gait belt   fall prevention program maintained   clutter free environment maintained   assistive device/personal items within reach  Goal: Optimal Comfort and Wellbeing  Outcome: Ongoing, Progressing  Goal: Home and Community Transition Plan Established  Outcome: Ongoing, Progressing     Problem: Fall Injury Risk  Goal: Absence of Fall and Fall-Related Injury  Outcome: Ongoing, Progressing  Intervention: Identify and Manage Contributors  Recent Flowsheet Documentation  Taken 9/2/2023 0725 by Ramos Denny, RN  Medication Review/Management: medications reviewed  Intervention: Promote Injury-Free Environment  Recent Flowsheet Documentation  Taken 9/2/2023 1202 by Ramos Denny, RN  Safety Promotion/Fall Prevention: safety round/check completed  Taken 9/2/2023 1003 by Ramos Denny, RN  Safety Promotion/Fall Prevention: safety round/check completed  Taken 9/2/2023 0725 by Ramos Denny, RN  Safety Promotion/Fall Prevention:   safety round/check completed   nonskid shoes/slippers when out of bed   gait belt   fall prevention  program maintained   clutter free environment maintained   assistive device/personal items within reach     Problem: Skin Injury Risk Increased  Goal: Skin Health and Integrity  Outcome: Ongoing, Progressing  Intervention: Promote and Optimize Oral Intake  Recent Flowsheet Documentation  Taken 9/2/2023 0725 by Ramos Denny, RN  Oral Nutrition Promotion: physical activity promoted  Intervention: Optimize Skin Protection  Recent Flowsheet Documentation  Taken 9/2/2023 0725 by Ramos Denny, RN  Pressure Reduction Techniques:   pressure points protected   heels elevated off bed   frequent weight shift encouraged  Pressure Reduction Devices: pressure-redistributing mattress utilized  Skin Protection: incontinence pads utilized     Problem: Hypertension Comorbidity  Goal: Blood Pressure in Desired Range  Outcome: Ongoing, Progressing  Intervention: Maintain Blood Pressure Management  Recent Flowsheet Documentation  Taken 9/2/2023 0725 by Ramos Denny, RN  Medication Review/Management: medications reviewed

## 2023-09-02 NOTE — PLAN OF CARE
Pt resting in bed. On peritoneal dialysis HS. Stable VS. No new complaints of pain. No changes from baseline. Continue plan of care.

## 2023-09-02 NOTE — PROGRESS NOTES
Rehabilitation Nursing  Inpatient Rehabilitation Plan of Care Note    Plan of Care  Copy from POCSafety    Potential for Injury (Active)  Current Status (8/23/2023 7:00:00 PM): NO FALLS  Weekly Goal: NO FALLS  Discharge Goal: NO FALLS    Body Systems    Integumentary (Active)  Current Status (8/23/2023 7:00:00 PM): NO FURTHER BREAKDOWN  Weekly Goal: NO FURTHER BREAKDOWN  Discharge Goal: NO FURTHER BREAKDOWN    Signed by: Ramos Denny RN

## 2023-09-03 LAB
GLUCOSE BLDC GLUCOMTR-MCNC: 120 MG/DL (ref 70–130)
GLUCOSE BLDC GLUCOMTR-MCNC: 164 MG/DL (ref 70–130)
GLUCOSE BLDC GLUCOMTR-MCNC: 182 MG/DL (ref 70–130)
GLUCOSE BLDC GLUCOMTR-MCNC: 255 MG/DL (ref 70–130)

## 2023-09-03 PROCEDURE — 25010000002 HEPARIN (PORCINE) PER 1000 UNITS: Performed by: INTERNAL MEDICINE

## 2023-09-03 PROCEDURE — 63710000001 INSULIN GLARGINE PER 5 UNITS: Performed by: INTERNAL MEDICINE

## 2023-09-03 PROCEDURE — 82948 REAGENT STRIP/BLOOD GLUCOSE: CPT

## 2023-09-03 PROCEDURE — 63710000001 INSULIN LISPRO (HUMAN) PER 5 UNITS: Performed by: INTERNAL MEDICINE

## 2023-09-03 RX ORDER — LISINOPRIL 10 MG/1
20 TABLET ORAL NIGHTLY
Status: DISCONTINUED | OUTPATIENT
Start: 2023-09-03 | End: 2023-09-07 | Stop reason: HOSPADM

## 2023-09-03 RX ORDER — GENTAMICIN SULFATE 1 MG/G
1 OINTMENT TOPICAL EVERY 24 HOURS
Status: DISCONTINUED | OUTPATIENT
Start: 2023-09-03 | End: 2023-09-07 | Stop reason: HOSPADM

## 2023-09-03 RX ADMIN — HYDROXYZINE HYDROCHLORIDE 12.5 MG: 25 TABLET, FILM COATED ORAL at 23:43

## 2023-09-03 RX ADMIN — AMLODIPINE BESYLATE 5 MG: 5 TABLET ORAL at 09:34

## 2023-09-03 RX ADMIN — TIZANIDINE 4 MG: 4 TABLET ORAL at 20:15

## 2023-09-03 RX ADMIN — METOPROLOL SUCCINATE 25 MG: 25 TABLET, EXTENDED RELEASE ORAL at 09:34

## 2023-09-03 RX ADMIN — HEPARIN SODIUM 5000 UNITS: 5000 INJECTION INTRAVENOUS; SUBCUTANEOUS at 20:09

## 2023-09-03 RX ADMIN — FLUTICASONE PROPIONATE 2 SPRAY: 50 SPRAY, METERED NASAL at 09:35

## 2023-09-03 RX ADMIN — INSULIN LISPRO 3 UNITS: 100 INJECTION, SOLUTION INTRAVENOUS; SUBCUTANEOUS at 09:33

## 2023-09-03 RX ADMIN — HYDROCODONE BITARTRATE AND ACETAMINOPHEN 1 TABLET: 10; 325 TABLET ORAL at 20:12

## 2023-09-03 RX ADMIN — HEPARIN SODIUM 5000 UNITS: 5000 INJECTION INTRAVENOUS; SUBCUTANEOUS at 09:34

## 2023-09-03 RX ADMIN — ROPINIROLE HYDROCHLORIDE 0.5 MG: 0.25 TABLET, FILM COATED ORAL at 09:33

## 2023-09-03 RX ADMIN — CASTOR OIL AND BALSAM, PERU 1 APPLICATION: 788; 87 OINTMENT TOPICAL at 20:12

## 2023-09-03 RX ADMIN — ROPINIROLE HYDROCHLORIDE 0.5 MG: 0.25 TABLET, FILM COATED ORAL at 20:09

## 2023-09-03 RX ADMIN — LEVOTHYROXINE SODIUM 50 MCG: 50 TABLET ORAL at 09:33

## 2023-09-03 RX ADMIN — Medication 1 TABLET: at 09:34

## 2023-09-03 RX ADMIN — ISOSORBIDE MONONITRATE 30 MG: 30 TABLET, EXTENDED RELEASE ORAL at 09:34

## 2023-09-03 RX ADMIN — ATORVASTATIN CALCIUM 80 MG: 40 TABLET, FILM COATED ORAL at 20:09

## 2023-09-03 RX ADMIN — LISINOPRIL 20 MG: 10 TABLET ORAL at 20:08

## 2023-09-03 RX ADMIN — INSULIN LISPRO 3 UNITS: 100 INJECTION, SOLUTION INTRAVENOUS; SUBCUTANEOUS at 11:50

## 2023-09-03 RX ADMIN — HYDROXYZINE HYDROCHLORIDE 12.5 MG: 25 TABLET, FILM COATED ORAL at 15:33

## 2023-09-03 RX ADMIN — INSULIN GLARGINE 20 UNITS: 100 INJECTION, SOLUTION SUBCUTANEOUS at 09:33

## 2023-09-03 RX ADMIN — ASPIRIN 81 MG: 81 TABLET, COATED ORAL at 09:34

## 2023-09-03 RX ADMIN — FLUOXETINE HYDROCHLORIDE 40 MG: 20 CAPSULE ORAL at 09:34

## 2023-09-03 RX ADMIN — PANTOPRAZOLE SODIUM 40 MG: 40 TABLET, DELAYED RELEASE ORAL at 09:33

## 2023-09-03 RX ADMIN — CASTOR OIL AND BALSAM, PERU 1 APPLICATION: 788; 87 OINTMENT TOPICAL at 09:35

## 2023-09-03 RX ADMIN — TRAZODONE HYDROCHLORIDE 50 MG: 50 TABLET ORAL at 20:09

## 2023-09-03 NOTE — PROGRESS NOTES
Nephrology Progress Note    Interval History:     Patient Complaints: No complaints.  Blood pressure is a little better.  Swelling is better.        Vital Signs  Temp:  [98 °F (36.7 °C)-98.4 °F (36.9 °C)] 98 °F (36.7 °C)  Heart Rate:  [72-80] 80  Resp:  [18] 18  BP: (135-172)/(66-79) 169/77    Physical Exam:    General:                   Cachectic        HEENT: Pale                     Neck: No JVD         Lungs:   Clear   Heart:  RRR,  no rub         Abdomen:   Normal bowel sounds, soft non-tender, non-distended, no guarding         Extremities: + edema        Results Review:    I reviewed the patient's new clinical results.    Lab Results (last 24 hours)       Procedure Component Value Units Date/Time    POC Glucose Once [[id number]]  (Normal) Collected: 09/03/23 1559    Specimen: Blood Updated: 09/03/23 1605     Glucose 120 mg/dL     POC Glucose Once [[id number]]  (Abnormal) Collected: 09/03/23 1108    Specimen: Blood Updated: 09/03/23 1114     Glucose 164 mg/dL     POC Glucose Once [[id number]]  (Abnormal) Collected: 09/03/23 0613    Specimen: Blood Updated: 09/03/23 0635     Glucose 182 mg/dL             Imaging Results (Last 24 Hours)       ** No results found for the last 24 hours. **            Assessment and Plan:       1.  End-stage renal disease on peritoneal dialysis  2.  Type 2 diabetes with nephropathy  3.  New onset hypothyroidism being addressed by Dr. Spence  4.  Hypertension  5.  Malnutrition  6.  Anasarca  7.  Anemia of chronic kidney disease    Gave orders to the peritoneal dialysis nurse to go ahead and do another 2 g of 2.5% as the swelling is better  We will add back lisinopril as his blood pressure although better is still high  We will start Retacrit.  With next blood draws we will check iron profile    Han Sandy MD  09/03/23  17:05 EDT

## 2023-09-03 NOTE — PLAN OF CARE
Pt resting in bed. No changes from baseline. Stable VS. On peritoneal dialysis without complaints. Continue care plan.

## 2023-09-03 NOTE — PLAN OF CARE
Goal Outcome Evaluation:  Plan of Care Reviewed With: patient        Progress: no change  Outcome Evaluation: Patient is resting in bed at this time. Patient complained of anxiety; PRN meds given per order. VSS. No acute distress noted at this time. Will continue with POC.

## 2023-09-04 LAB
GLUCOSE BLDC GLUCOMTR-MCNC: 111 MG/DL (ref 70–130)
GLUCOSE BLDC GLUCOMTR-MCNC: 220 MG/DL (ref 70–130)
GLUCOSE BLDC GLUCOMTR-MCNC: 223 MG/DL (ref 70–130)
GLUCOSE BLDC GLUCOMTR-MCNC: 287 MG/DL (ref 70–130)

## 2023-09-04 PROCEDURE — 63710000001 INSULIN GLARGINE PER 5 UNITS: Performed by: INTERNAL MEDICINE

## 2023-09-04 PROCEDURE — 97110 THERAPEUTIC EXERCISES: CPT | Performed by: OCCUPATIONAL THERAPIST

## 2023-09-04 PROCEDURE — 97530 THERAPEUTIC ACTIVITIES: CPT | Performed by: OCCUPATIONAL THERAPIST

## 2023-09-04 PROCEDURE — 97116 GAIT TRAINING THERAPY: CPT

## 2023-09-04 PROCEDURE — 94799 UNLISTED PULMONARY SVC/PX: CPT

## 2023-09-04 PROCEDURE — 25010000002 HEPARIN (PORCINE) PER 1000 UNITS: Performed by: INTERNAL MEDICINE

## 2023-09-04 PROCEDURE — 97535 SELF CARE MNGMENT TRAINING: CPT | Performed by: OCCUPATIONAL THERAPIST

## 2023-09-04 PROCEDURE — 63710000001 INSULIN LISPRO (HUMAN) PER 5 UNITS: Performed by: INTERNAL MEDICINE

## 2023-09-04 PROCEDURE — 99231 SBSQ HOSP IP/OBS SF/LOW 25: CPT | Performed by: FAMILY MEDICINE

## 2023-09-04 PROCEDURE — 97110 THERAPEUTIC EXERCISES: CPT

## 2023-09-04 PROCEDURE — 25010000002 EPOETIN ALFA PER 1000 UNITS: Performed by: INTERNAL MEDICINE

## 2023-09-04 PROCEDURE — 97530 THERAPEUTIC ACTIVITIES: CPT

## 2023-09-04 PROCEDURE — 82948 REAGENT STRIP/BLOOD GLUCOSE: CPT

## 2023-09-04 RX ORDER — AMLODIPINE BESYLATE 5 MG/1
5 TABLET ORAL 2 TIMES DAILY
Status: DISCONTINUED | OUTPATIENT
Start: 2023-09-04 | End: 2023-09-07 | Stop reason: HOSPADM

## 2023-09-04 RX ADMIN — AMLODIPINE BESYLATE 5 MG: 5 TABLET ORAL at 20:21

## 2023-09-04 RX ADMIN — AMLODIPINE BESYLATE 5 MG: 5 TABLET ORAL at 09:24

## 2023-09-04 RX ADMIN — ERYTHROPOIETIN 10000 UNITS: 20000 INJECTION, SOLUTION INTRAVENOUS; SUBCUTANEOUS at 17:20

## 2023-09-04 RX ADMIN — ROPINIROLE HYDROCHLORIDE 0.5 MG: 0.25 TABLET, FILM COATED ORAL at 09:25

## 2023-09-04 RX ADMIN — INSULIN GLARGINE 20 UNITS: 100 INJECTION, SOLUTION SUBCUTANEOUS at 09:26

## 2023-09-04 RX ADMIN — HEPARIN SODIUM 5000 UNITS: 5000 INJECTION INTRAVENOUS; SUBCUTANEOUS at 20:21

## 2023-09-04 RX ADMIN — TRAZODONE HYDROCHLORIDE 50 MG: 50 TABLET ORAL at 20:21

## 2023-09-04 RX ADMIN — ASPIRIN 81 MG: 81 TABLET, COATED ORAL at 09:24

## 2023-09-04 RX ADMIN — CASTOR OIL AND BALSAM, PERU 1 APPLICATION: 788; 87 OINTMENT TOPICAL at 20:21

## 2023-09-04 RX ADMIN — PANTOPRAZOLE SODIUM 40 MG: 40 TABLET, DELAYED RELEASE ORAL at 09:25

## 2023-09-04 RX ADMIN — FLUTICASONE PROPIONATE 2 SPRAY: 50 SPRAY, METERED NASAL at 09:25

## 2023-09-04 RX ADMIN — ISOSORBIDE MONONITRATE 30 MG: 30 TABLET, EXTENDED RELEASE ORAL at 09:24

## 2023-09-04 RX ADMIN — HYDROCODONE BITARTRATE AND ACETAMINOPHEN 1 TABLET: 10; 325 TABLET ORAL at 20:34

## 2023-09-04 RX ADMIN — LEVOTHYROXINE SODIUM 50 MCG: 50 TABLET ORAL at 09:24

## 2023-09-04 RX ADMIN — ROPINIROLE HYDROCHLORIDE 0.5 MG: 0.25 TABLET, FILM COATED ORAL at 20:21

## 2023-09-04 RX ADMIN — METOPROLOL SUCCINATE 25 MG: 25 TABLET, EXTENDED RELEASE ORAL at 09:24

## 2023-09-04 RX ADMIN — HYDROXYZINE HYDROCHLORIDE 12.5 MG: 25 TABLET, FILM COATED ORAL at 20:26

## 2023-09-04 RX ADMIN — Medication 1 TABLET: at 09:24

## 2023-09-04 RX ADMIN — LISINOPRIL 20 MG: 10 TABLET ORAL at 20:21

## 2023-09-04 RX ADMIN — CASTOR OIL AND BALSAM, PERU 1 APPLICATION: 788; 87 OINTMENT TOPICAL at 09:27

## 2023-09-04 RX ADMIN — ATORVASTATIN CALCIUM 80 MG: 40 TABLET, FILM COATED ORAL at 20:20

## 2023-09-04 RX ADMIN — HEPARIN SODIUM 5000 UNITS: 5000 INJECTION INTRAVENOUS; SUBCUTANEOUS at 09:24

## 2023-09-04 RX ADMIN — INSULIN LISPRO 5 UNITS: 100 INJECTION, SOLUTION INTRAVENOUS; SUBCUTANEOUS at 09:26

## 2023-09-04 RX ADMIN — FLUOXETINE HYDROCHLORIDE 40 MG: 20 CAPSULE ORAL at 09:24

## 2023-09-04 RX ADMIN — HYDROCODONE BITARTRATE AND ACETAMINOPHEN 1 TABLET: 10; 325 TABLET ORAL at 09:24

## 2023-09-04 RX ADMIN — INSULIN LISPRO 5 UNITS: 100 INJECTION, SOLUTION INTRAVENOUS; SUBCUTANEOUS at 12:00

## 2023-09-04 NOTE — THERAPY TREATMENT NOTE
Inpatient Rehabilitation - Physical Therapy Treatment Note       EDWARD Veliz     Patient Name: Reny Elena  : 1960  MRN: [id number]    Today's Date: 2023                    Admit Date: 2023      Visit Dx:   No diagnosis found.    Patient Active Problem List   Diagnosis    Tibia/fibula fracture    Iron deficiency anemia    Type 2 diabetes mellitus with hyperglycemia, with long-term current use of insulin    Wound of right ankle    Cellulitis    Metabolic encephalopathy    History of non-ST elevation myocardial infarction (NSTEMI)    HTN (hypertension)    CVA (cerebral vascular accident)    Chronic diastolic CHF (congestive heart failure)    Decubitus ulcer of coccyx, unspecified pressure ulcer stage    Depression    Expressive aphasia    Impaired mobility and ADLs    Hyperkalemia    Subdural hematoma    Severe malnutrition    Cerebrovascular accident (CVA), unspecified mechanism    PRES (posterior reversible encephalopathy syndrome)    Debility       Past Medical History:   Diagnosis Date    Arthritis     Closed fracture of right fibula and tibia 2018    Depression     Diabetic ulcer of left foot associated with type 2 diabetes mellitus 2017    Diastolic dysfunction     Disease of thyroid gland     Elevated cholesterol     End stage renal disease     Essential hypertension     GERD (gastroesophageal reflux disease)     History of transfusion     Hyperlipidemia     Iron deficiency anemia 2018    PAD (peripheral artery disease)     Renal failure     Type 2 diabetes mellitus with hyperglycemia, with long-term current use of insulin 2018       Past Surgical History:   Procedure Laterality Date    ABDOMINAL SURGERY      BACK SURGERY      Post spinal diskectomy, osteophytectomy in one lumbar interspace    CATARACT EXTRACTION      Left      SECTION      DENTAL PROCEDURE      ENDOSCOPY      FRACTURE SURGERY      right leg    HYSTERECTOMY      INCISION AND DRAINAGE LEG Left  4/15/2017    Procedure: INCISION AND DRAINAGE LOWER EXTREMITY;  Surgeon: Basilio Morris MD;  Location: [facility] OR;  Service:     INCISION AND DRAINAGE LEG Left 5/26/2017    Procedure: Irrigation and Debridment abcess diabetic wound left foot with deep culture;  Surgeon: Basilio Morris MD;  Location: [facility] OR;  Service:     INSERTION PERITONEAL DIALYSIS CATHETER N/A 12/16/2021    Procedure: INSERTION PERITONEAL DIALYSIS CATHETER LAPAROSCOPIC;  Surgeon: Edy Glover MD;  Location: [facility] OR;  Service: General;  Laterality: N/A;    KNEE ARTHROSCOPY Right     ORIF TIBIA FRACTURE Right 12/28/2018    Procedure: TIBIAL  FRACTURE OPEN REDUCTION INTERNAL FIXATION;  Surgeon: Jung Barragan MD;  Location: [facility] OR;  Service: Orthopedics    TOE AMPUTATION Right     5th    TUBAL ABDOMINAL LIGATION         PT ASSESSMENT (last 12 hours)       IRF PT Evaluation and Treatment       Row Name 09/04/23 1415          PT Time and Intention    Document Type daily treatment  -RG     Mode of Treatment physical therapy  -RG     Patient/Family/Caregiver Comments/Observations Pt and nursing in agreement for skilled PT on this date.  -RG       Row Name 09/04/23 1415          General Information    Patient Profile Reviewed yes  -RG     Existing Precautions/Restrictions fall  hx L patellar subluxation; peritoneal dialysis port R abdomen  -RG       Row Name 09/04/23 1415          Cognition/Psychosocial    Affect/Mental Status (Cognition) WFL  -RG     Orientation Status (Cognition) oriented x 3  -RG     Follows Commands (Cognition) verbal cues/prompting required  -RG     Personal Safety Interventions gait belt;fall prevention program maintained;nonskid shoes/slippers when out of bed  -RG     Cognitive Function WFL  -RG       Row Name 09/04/23 1415          Transfer Assessment/Treatment    Transfers toilet transfer;stand pivot/stand step transfer;sit-stand transfer;stand-sit transfer  -RG       Row Name 09/04/23 1415          Sit-Stand  Transfer    Sit-Stand Barber (Transfers) verbal cues;nonverbal cues (demo/gesture);minimum assist (75% patient effort)  -RG     Assistive Device (Sit-Stand Transfers) walker, front-wheeled;wheelchair  -RG       Row Name 09/04/23 1415          Stand-Sit Transfer    Stand-Sit Barber (Transfers) verbal cues;nonverbal cues (demo/gesture);minimum assist (75% patient effort)  -RG     Assistive Device (Stand-Sit Transfers) walker, front-wheeled;wheelchair  -RG       Row Name 09/04/23 1415          Stand Pivot/Stand Step Transfer    Stand Pivot/Stand Step Barber (Transfers) verbal cues;nonverbal cues (demo/gesture);minimum assist (75% patient effort)  -RG     Assistive Device (Stand Pivot Stand Step Transfer) walker, front-wheeled  -RG       Row Name 09/04/23 1415          Gait/Stairs (Locomotion)    Gait/Stairs Locomotion gait/ambulation independence;gait/ambulation assistive device;distance ambulated;gait pattern;gait deviations  -RG     Barber Level (Gait) verbal cues;nonverbal cues (demo/gesture);minimum assist (75% patient effort)  -RG     Assistive Device (Gait) walker, front-wheeled  -RG     Pattern (Gait) step-through  cues for incr stride length and to keep SHELIA inside walker  -RG     Deviations/Abnormal Patterns (Gait) stride length decreased;gait speed decreased;casa decreased  -RG     Bilateral Gait Deviations forward flexed posture;heel strike decreased  -RG     Comment, (Gait/Stairs) did not ambulate 2nd session.  -       Row Name 09/04/23 1415          Safety Issues, Functional Mobility    Impairments Affecting Function (Mobility) balance;endurance/activity tolerance;strength;pain  -       Row Name 09/04/23 1415          Hip (Therapeutic Exercise)    Hip Strengthening (Therapeutic Exercise) bilateral;flexion;aBduction;aDduction;marching while standing;standing;10 repetitions;3 sets  -       Row Name 09/04/23 1415          Knee (Therapeutic Exercise)    Knee Strengthening  (Therapeutic Exercise) flexion;bilateral;extension;marching while standing;standing;20 repititions  -RG       Row Name 09/04/23 1415          Ankle (Therapeutic Exercise)    Ankle Strengthening (Therapeutic Exercise) bilateral;dorsiflexion;plantarflexion;standing;20 repititions  -RG       Row Name 09/04/23 1415          Positioning and Restraints    Pre-Treatment Position in bed  -RG     Post Treatment Position bed  -RG     In Bed notified nsg;supine;call light within reach;encouraged to call for assist;legs elevated  -RG       Row Name 09/04/23 1415          Therapy Assessment/Plan (PT)    Patient's Goals For Discharge return home  -RG       Row Name 09/04/23 1415          Therapy Plan Review/Discharge Plan (PT)    Anticipated Equipment Needs at Discharge (PT Eval) front wheeled walker;wheelchair  -RG     Anticipated Discharge Disposition (PT) home with assist;home with home health  -RG               User Key  (r) = Recorded By, (t) = Taken By, (c) = Cosigned By      Initials Name Provider Type    RG Rajiv Pagan, PTA Physical Therapist Assistant                  Wound 08/23/23 1923 Right posterior heel (Active)   Dressing Appearance open to air 09/04/23 0924   Closure None 09/04/23 0924   Base non-blanchable;maroon/purple 09/04/23 0924   Drainage Amount none 09/04/23 0924   Care, Wound barrier applied 09/04/23 0924   Dressing Care open to air 09/04/23 0924       Wound 08/23/23 2313 Left gluteal (Active)   Dressing Appearance open to air 09/04/23 0924   Closure None 09/04/23 0924   Base non-blanchable 09/04/23 0924   Periwound dry;non-blanchable 09/04/23 0924   Drainage Amount none 09/04/23 0924   Care, Wound barrier applied 09/04/23 0924   Dressing Care open to air 09/04/23 0924     Physical Therapy Education       Title: PT OT SLP Therapies (Done)       Topic: Physical Therapy (Done)       Point: Mobility training (Done)       Learning Progress Summary             Patient Acceptance, E,D, VU,NR by RG at  9/4/2023 1419    Acceptance, TB, NR by DL at 9/3/2023 2009    Acceptance, E,D, VU,NR by AG at 9/1/2023 1201    Acceptance, TB, NR by DL at 8/30/2023 2120    Acceptance, E,TB, VU by RM at 8/30/2023 1530    Acceptance, TB, NR by DL at 8/29/2023 2104    Acceptance, E,TB, VU by RM at 8/29/2023 1600    Acceptance, TB, NR by DL at 8/28/2023 2048    Acceptance, E,TB, VU,DU by HC at 8/28/2023 1451    Acceptance, D,E, VU,NR by RG at 8/28/2023 1436    Acceptance, TB, NR by DL at 8/27/2023 2309    Acceptance, E,D, VU,NR by RG at 8/26/2023 1522    Acceptance, E,D, VU,NR by LL at 8/25/2023 1525    Acceptance, E,D, VU,NR by RG at 8/25/2023 1433    Acceptance, E, VU,NR by LB at 8/24/2023 1149                         Point: Home exercise program (Done)       Learning Progress Summary             Patient Acceptance, E,D, VU,NR by RG at 9/4/2023 1419    Acceptance, TB, NR by DL at 9/3/2023 2009    Acceptance, E,D, VU,NR by AG at 9/1/2023 1201    Acceptance, TB, NR by DL at 8/30/2023 2120    Acceptance, E,TB, VU by RM at 8/30/2023 1530    Acceptance, TB, NR by DL at 8/29/2023 2104    Acceptance, E,TB, VU by RM at 8/29/2023 1600    Acceptance, TB, NR by DL at 8/28/2023 2048    Acceptance, E,TB, VU,DU by HC at 8/28/2023 1451    Acceptance, D,E, VU,NR by RG at 8/28/2023 1436    Acceptance, TB, NR by DL at 8/27/2023 2309    Acceptance, E,D, VU,NR by RG at 8/26/2023 1522    Acceptance, E,D, VU,NR by LL at 8/25/2023 1525    Acceptance, E,D, VU,NR by RG at 8/25/2023 1433    Acceptance, E, VU,NR by LB at 8/24/2023 1149                         Point: Body mechanics (Done)       Learning Progress Summary             Patient Acceptance, E,D, VU,NR by RG at 9/4/2023 1419    Acceptance, TB, NR by DL at 9/3/2023 2009    Acceptance, E,D, VU,NR by AG at 9/1/2023 1201    Acceptance, TB, NR by DL at 8/30/2023 2120    Acceptance, E,TB, VU by RM at 8/30/2023 1530    Acceptance, TB, NR by DL at 8/29/2023 2104    Acceptance, E,TB, VU by RM at 8/29/2023  1600    Acceptance, TB, NR by DL at 8/28/2023 2048    Acceptance, E,TB, VU,DU by HC at 8/28/2023 1451    Acceptance, D,E, VU,NR by RG at 8/28/2023 1436    Acceptance, TB, NR by DL at 8/27/2023 2309    Acceptance, E,D, VU,NR by RG at 8/26/2023 1522    Acceptance, E,D, VU,NR by LL at 8/25/2023 1525    Acceptance, E,D, VU,NR by RG at 8/25/2023 1433    Acceptance, E, VU,NR by LB at 8/24/2023 1149                         Point: Precautions (Done)       Learning Progress Summary             Patient Acceptance, E,D, VU,NR by RG at 9/4/2023 1419    Acceptance, TB, NR by DL at 9/3/2023 2009    Acceptance, E,D, VU,NR by AG at 9/1/2023 1201    Acceptance, TB, NR by DL at 8/30/2023 2120    Acceptance, E,TB, VU by RM at 8/30/2023 1530    Acceptance, TB, NR by DL at 8/29/2023 2104    Acceptance, E,TB, VU by RM at 8/29/2023 1600    Acceptance, TB, NR by DL at 8/28/2023 2048    Acceptance, E,TB, VU,DU by HC at 8/28/2023 1451    Acceptance, D,E, VU,NR by RG at 8/28/2023 1436    Acceptance, TB, NR by DL at 8/27/2023 2309    Acceptance, E,D, VU,NR by RG at 8/26/2023 1522    Acceptance, E,D, VU,NR by LL at 8/25/2023 1525    Acceptance, E,D, VU,NR by RG at 8/25/2023 1433    Acceptance, E, VU,NR by LB at 8/24/2023 1149                                         User Key       Initials Effective Dates Name Provider Type Discipline    LB 06/16/21 -  Christel Ferguson, PT Physical Therapist PT    AG 06/16/21 -  Anu Chin, PT Physical Therapist PT    LL 05/02/16 -  Carly Lundberg PTA Physical Therapist Assistant PT    RM 02/17/23 -  Patrizia Hope, PTA Physical Therapist Assistant PT    RG 06/16/21 -  Rajiv Pagan, SHITAL Physical Therapist Assistant PT    HC 01/20/23 -  Anaid Bo, SHITAL Physical Therapist Assistant PT    DL 03/16/22 -  Claudine Lundberg, RN Registered Nurse Nurse                    PT Recommendation and Plan    Frequency of Treatment (PT): 5 times per week  Anticipated Equipment Needs at Discharge  (PT Eval): front wheeled walker, wheelchair                  Time Calculation:      PT Charges       Row Name 09/04/23 1419             Time Calculation    Start Time 1245  -RG      Stop Time 1330  -RG      Time Calculation (min) 45 min  -RG      PT Received On 09/04/23  -RG         Time Calculation- PT    Total Timed Code Minutes- PT 45 minute(s)  -RG                User Key  (r) = Recorded By, (t) = Taken By, (c) = Cosigned By      Initials Name Provider Type    RG Rajiv Pagan PTA Physical Therapist Assistant                    Therapy Charges for Today       Code Description Service Date Service Provider Modifiers Qty    58540869695 HC PT THERAPEUTIC ACT EA 15 MIN 9/4/2023 Rajiv Pagan PTA CQ 1    42021965833 HC PT THER PROC EA 15 MIN 9/4/2023 Rajiv Pagan PTA CQ 2              PT G-Codes  AM-PAC 6 Clicks Score (PT): 17      Rajiv Pagan PTA  9/4/2023

## 2023-09-04 NOTE — PROGRESS NOTES
Inpatient Rehabilitation Functional Measures Assessment and Plan of Care    Plan of Care  Self Care    [OT] Toileting(Active)  Current Status(09/04/2023): Mod A  Weekly Goal(09/12/2023): Min A  Discharge Goal: CGA    Functional Measures  LINO Eating:  LINO Grooming:  LINO Bathing:  LINO Upper Body Dressing:  LINO Lower Body Dressing:  LINO Toileting:    LINO Bladder Management  Level of Assistance:  Frequency/Number of Accidents this Shift:    LINO Bowel Management  Level of Assistance:  Frequency/Number of Accidents this Shift:    LINO Bed/Chair/Wheelchair Transfer:  LINO Toilet Transfer:  LINO Tub/Shower Transfer:    Previously Documented Mode of Locomotion at Discharge:  LINO Expected Mode of Locomotion at Discharge:  LINO Walk/Wheelchair:  LINO Stairs:    LINO Comprehension:  LINO Expression:  LINO Social Interaction:  LINO Problem Solving:  LINO Memory:    Therapy Mode Minutes  Occupational Therapy: Individual: 100 minutes.  Physical Therapy:  Speech Language Pathology:    Discharge Functional Goals:    Signed by: Yael Mendez OT

## 2023-09-04 NOTE — PROGRESS NOTES
Nephrology Progress Note    Interval History:     Patient Complaints: Ambulating in the hallway.  No complaints.        Vital Signs  Temp:  [98.1 °F (36.7 °C)-98.4 °F (36.9 °C)] 98.1 °F (36.7 °C)  Heart Rate:  [77-80] 78  Resp:  [16-18] 16  BP: (143-191)/() 191/106    Physical Exam:    General:           Up on feet with walker and 2 people assisting      HEENT: No cyanosis               Neck: No JVD       Lungs:   clear   Heart:  REGULAR,  no rub       Abdomen:   Normal bowel sounds, soft non-tender, non-distended, no guarding       Extremities: Trace edeema                        Results Review:    I reviewed the patient's new clinical results.    Lab Results (last 24 hours)       Procedure Component Value Units Date/Time    POC Glucose Once [[id number]]  (Abnormal) Collected: 09/04/23 0558    Specimen: Blood Updated: 09/04/23 0617     Glucose 220 mg/dL     POC Glucose Once [[id number]]  (Abnormal) Collected: 09/03/23 1940    Specimen: Blood Updated: 09/03/23 1953     Glucose 255 mg/dL     POC Glucose Once [[id number]]  (Normal) Collected: 09/03/23 1559    Specimen: Blood Updated: 09/03/23 1605     Glucose 120 mg/dL     POC Glucose Once [[id number]]  (Abnormal) Collected: 09/03/23 1108    Specimen: Blood Updated: 09/03/23 1114     Glucose 164 mg/dL             Imaging Results (Last 24 Hours)       ** No results found for the last 24 hours. **            Assessment and Plan:       1.  End-stage renal disease on peritoneal dialysis  2.  Type 2 diabetes with nephropathy  3.  New onset hypothyroidism being addressed by Dr. Spence  4.  Hypertension  5.  Malnutrition  6.  Anasarca  7.  Anemia of chronic kidney disease    Edema is better  Blood pressure remains a bit high  Will adjust medicines a little bit more  We will plan only 1 bag of 2.5 and 1 bag of 1.5% for dialysis today as edema better    Han Sandy MD  09/04/23  11:04 EDT

## 2023-09-04 NOTE — PROGRESS NOTES
[facility]  PROGRESS NOTE     Patient Identification:  Name:  Reny Elena  Age:  63 y.o.  Sex:  female  :  1960  MRN:  [id number]  Visit Number:  [id number]  ROOM: 106/     Primary Care Provider:  Susan Stephens APRN    Length of stay in inpatient status:  12    Subjective     Chief Compliant:  No chief complaint on file.      History of Presenting Illness: 53-year-old female being treated for debility.  Patient does have end-stage renal disease getting peritoneal dialysis, hypertension, hyponatremia, diabetes mellitus, thrombocytopenia, CHF preserved EF and hypothyroidism.  Patient states she seems to be doing reasonably well has no new complaints    Objective     Current Hospital Meds:amLODIPine, 5 mg, Oral, Daily  aspirin, 81 mg, Oral, Daily  atorvastatin, 80 mg, Oral, Nightly  castor oil-balsam peru, 1 application , Topical, Q12H  epoetin earnest/earnest-epbx, 10,000 Units, Subcutaneous, Once per day on   FLUoxetine, 40 mg, Oral, Daily  fluticasone, 2 spray, Each Nare, Daily  gentamicin, 1 application , Topical, Q24H  heparin (porcine), 5,000 Units, Subcutaneous, Q12H  insulin glargine, 20 Units, Subcutaneous, Daily  insulin lispro, 3-14 Units, Subcutaneous, TID With Meals  isosorbide mononitrate, 30 mg, Oral, Daily  levothyroxine, 50 mcg, Oral, Daily  lisinopril, 20 mg, Oral, Nightly  metoprolol succinate XL, 25 mg, Oral, Daily  multivitamin, 1 tablet, Oral, Daily  pantoprazole, 40 mg, Oral, Daily  rOPINIRole, 0.5 mg, Oral, BID  traZODone, 50 mg, Oral, Nightly       ----------------------------------------------------------------------------------------------------------------------  Vital Signs:  Temp:  [98 °F (36.7 °C)-98.4 °F (36.9 °C)] 98.1 °F (36.7 °C)  Heart Rate:  [72-80] 78  Resp:  [16-18] 16  BP: (143-191)/() 191/106  SpO2:  [95 %-97 %] 96 %  on   ;   Device (Oxygen Therapy): room air  Body mass index is 21.28 kg/m².    Wt Readings from Last 3 Encounters:    08/23/23 56.2 kg (124 lb)   08/23/23 56.7 kg (124 lb 14.4 oz)   03/04/23 42.2 kg (93 lb)     Intake & Output (last 3 days)         09/01 0701 09/02 0700 09/02 0701 09/03 0700 09/03 0701 09/04 0700 09/04 0701 09/05 0700    P.O. 800 1044 1086     Total Intake(mL/kg) 800 (14.2) 1044 (18.6) 1086 (19.3)     Urine (mL/kg/hr)  0 (0)      Stool  0      Dialysis  2325  1315    Total Output  2325  1315    Net +800 -1281 +1086 -1315            Urine Unmeasured Occurrence 4 x 4 x 7 x     Stool Unmeasured Occurrence 1 x 1 x 1 x           Diet: Regular/House Diet, Diabetic Diets, Fluid Restriction (240 mL/tray) Diets; Consistent Carbohydrate; Other (Specify mL/day) (800 cc restriction daily); Texture: Soft to Chew (NDD 3); Soft to Chew: Chopped Meat; Fluid Consistency: Thin (IDDSI ...  ----------------------------------------------------------------------------------------------------------------------  Physical exam:  Constitutional: Chronically ill-appearing female no distress  HEENT: Normocephalic atraumatic  Neck: Supple  Cardiovascular: Regular rate and rhythm  Pulmonary/Chest: Clear to auscultation  Abdominal: Positive bowel sounds soft.   Musculoskeletal: No obvious arthropathy  Neurological: No focal deficits  Skin: No rash  Peripheral vascular: Trace edema  Genitourinary:  ----------------------------------------------------------------------------------------------------------------------    Last echocardiogram:  Results for orders placed during the hospital encounter of 08/19/23    Adult Transthoracic Echo Complete W/ Cont if Necessary Per Protocol    Interpretation Summary    Left ventricular systolic function is normal. Left ventricular ejection fraction appears to be 56 - 60%.    Left ventricular diastolic function is consistent with (grade I) impaired relaxation.    Left atrial volume is mildly increased.    Moderate mitral valve stenosis is present.    Estimated right ventricular systolic pressure from  tricuspid regurgitation is mildly elevated (35-45 mmHg).    Mild pulmonary hypertension is present.    There is no evidence of pericardial effusion.    ----------------------------------------------------------------------------------------------------------------------  Results from last 7 days   Lab Units 09/02/23  0100 08/31/23  1618 08/31/23  0239 08/29/23  0327   WBC 10*3/mm3 4.65  --  4.32 4.58   HEMOGLOBIN g/dL 8.0*  --  7.6* 8.2*   HEMATOCRIT % 26.3*  --  26.8* 25.6*   MCV fL 97.4*  --  105.1* 93.8   MCHC g/dL 30.4*  --  28.4* 32.0   PLATELETS 10*3/mm3 147 165 100* 151         Results from last 7 days   Lab Units 09/02/23 0100 08/31/23 0239 08/29/23  0327   SODIUM mmol/L 131* 129* 127*   POTASSIUM mmol/L 4.8 4.9 4.7   CHLORIDE mmol/L 95* 96* 92*   CO2 mmol/L 24.0 22.4 23.3   BUN mg/dL 109* 105* 86*   CREATININE mg/dL 5.17* 5.12* 4.85*   CALCIUM mg/dL 8.0* 7.7* 7.7*   GLUCOSE mg/dL 235* 166* 292*   ALBUMIN g/dL  --   --  2.5*   BILIRUBIN mg/dL  --   --  0.2   ALK PHOS U/L  --   --  101   AST (SGOT) U/L  --   --  26   ALT (SGPT) U/L  --   --  23   Estimated Creatinine Clearance: 9.6 mL/min (A) (by C-G formula based on SCr of 5.17 mg/dL (H)).  No results found for: AMMONIA              Glucose   Date/Time Value Ref Range Status   09/04/2023 0558 220 (H) 70 - 130 mg/dL Final   09/03/2023 1940 255 (H) 70 - 130 mg/dL Final   09/03/2023 1559 120 70 - 130 mg/dL Final   09/03/2023 1108 164 (H) 70 - 130 mg/dL Final   09/03/2023 0613 182 (H) 70 - 130 mg/dL Final   09/02/2023 1637 268 (H) 70 - 130 mg/dL Final   09/02/2023 1111 204 (H) 70 - 130 mg/dL Final   09/02/2023 0608 205 (H) 70 - 130 mg/dL Final     Lab Results   Component Value Date    .500 (H) 08/29/2023    FREET4 0.32 (L) 08/29/2023     No results found for: PREGTESTUR, PREGSERUM, HCG, HCGQUANT  Pain Management Panel  More data exists         Latest Ref Rng & Units 3/5/2023 5/11/2022   Pain Management Panel   Amphetamine, Urine Qual Negative Negative   Negative    Barbiturates Screen, Urine Negative Negative  Negative    Benzodiazepine Screen, Urine Negative Negative  Positive    Buprenorphine, Screen, Urine Negative Negative  Negative    Cocaine Screen, Urine Negative Negative  Negative    Methadone Screen , Urine Negative Negative  Negative    Methamphetamine, Ur Negative Negative  Negative      Brief Urine Lab Results  (Last result in the past 365 days)        Color   Clarity   Blood   Leuk Est   Nitrite   Protein   CREAT   Urine HCG        08/19/23 1337 Yellow   Clear   Negative   Negative   Negative   100 mg/dL (2+)                 No results found for: BLOODCX      No results found for: URINECX  No results found for: WOUNDCX  No results found for: STOOLCX        I have personally looked at the labs and they are summarized above.  ----------------------------------------------------------------------------------------------------------------------  Detailed radiology reports for the last 24 hours:    Imaging Results (Last 24 Hours)       ** No results found for the last 24 hours. **          Final impressions for the last 30 days of radiology reports:    XR Chest AP    Result Date: 8/19/2023   No evidence of acute disease in the chest.  This report was finalized on 8/19/2023 2:53 PM by Steven Melton MD.     I have personally looked at the radiology images and read the final radiology report.    Assessment & Plan    Debility--continue PT and OT.  Patient making excellent progress    Hypertension patient had elevated blood pressure this morning.  Will reevaluate after morning medications given.    End-stage renal disease continue peritoneal dialysis    Hyponatremia stable    Diabetes mellitus suboptimal control but improved from yesterday.  Will monitor for now    Hypothyroidism continue Synthroid    Remote history of CVA continue statin and aspirin therapy.    CHF preserved EF compensated    VTE Prophylaxis:   Mechanical Order History:        Ordered         08/31/23 0652  Place Sequential Compression Device  Once,   Status:  Canceled            08/31/23 0652  Maintain Sequential Compression Device  Continuous,   Status:  Canceled                          Pharmalogical Order History:        Ordered     Dose Route Frequency Stop    09/02/23 1107  heparin (porcine) 5000 UNIT/ML injection 5,000 Units         5,000 Units SC Every 12 Hours Scheduled --    08/24/23 1130  heparin (porcine) 5000 UNIT/ML injection 5,000 Units  Status:  Discontinued         5,000 Units SC Every 12 Hours Scheduled 08/31/23 0652                        Yeison Braden MD  [facility]  09/04/23  09:13 EDT

## 2023-09-04 NOTE — THERAPY TREATMENT NOTE
Inpatient Rehabilitation - Physical Therapy Treatment Note       EDWARD Veliz     Patient Name: Reny Elena  : 1960  MRN: [id number]    Today's Date: 2023                    Admit Date: 2023      Visit Dx:   No diagnosis found.    Patient Active Problem List   Diagnosis    Tibia/fibula fracture    Iron deficiency anemia    Type 2 diabetes mellitus with hyperglycemia, with long-term current use of insulin    Wound of right ankle    Cellulitis    Metabolic encephalopathy    History of non-ST elevation myocardial infarction (NSTEMI)    HTN (hypertension)    CVA (cerebral vascular accident)    Chronic diastolic CHF (congestive heart failure)    Decubitus ulcer of coccyx, unspecified pressure ulcer stage    Depression    Expressive aphasia    Impaired mobility and ADLs    Hyperkalemia    Subdural hematoma    Severe malnutrition    Cerebrovascular accident (CVA), unspecified mechanism    PRES (posterior reversible encephalopathy syndrome)    Debility       Past Medical History:   Diagnosis Date    Arthritis     Closed fracture of right fibula and tibia 2018    Depression     Diabetic ulcer of left foot associated with type 2 diabetes mellitus 2017    Diastolic dysfunction     Disease of thyroid gland     Elevated cholesterol     End stage renal disease     Essential hypertension     GERD (gastroesophageal reflux disease)     History of transfusion     Hyperlipidemia     Iron deficiency anemia 2018    PAD (peripheral artery disease)     Renal failure     Type 2 diabetes mellitus with hyperglycemia, with long-term current use of insulin 2018       Past Surgical History:   Procedure Laterality Date    ABDOMINAL SURGERY      BACK SURGERY      Post spinal diskectomy, osteophytectomy in one lumbar interspace    CATARACT EXTRACTION      Left      SECTION      DENTAL PROCEDURE      ENDOSCOPY      FRACTURE SURGERY      right leg    HYSTERECTOMY      INCISION AND DRAINAGE LEG Left  4/15/2017    Procedure: INCISION AND DRAINAGE LOWER EXTREMITY;  Surgeon: Basilio Morris MD;  Location: [facility] OR;  Service:     INCISION AND DRAINAGE LEG Left 5/26/2017    Procedure: Irrigation and Debridment abcess diabetic wound left foot with deep culture;  Surgeon: Basilio Morris MD;  Location: [facility] OR;  Service:     INSERTION PERITONEAL DIALYSIS CATHETER N/A 12/16/2021    Procedure: INSERTION PERITONEAL DIALYSIS CATHETER LAPAROSCOPIC;  Surgeon: Edy Glover MD;  Location: [facility] OR;  Service: General;  Laterality: N/A;    KNEE ARTHROSCOPY Right     ORIF TIBIA FRACTURE Right 12/28/2018    Procedure: TIBIAL  FRACTURE OPEN REDUCTION INTERNAL FIXATION;  Surgeon: Jung Barragan MD;  Location: [facility] OR;  Service: Orthopedics    TOE AMPUTATION Right     5th    TUBAL ABDOMINAL LIGATION         PT ASSESSMENT (last 12 hours)       IRF PT Evaluation and Treatment       Row Name 09/04/23 1501 09/04/23 1415       PT Time and Intention    Document Type daily treatment  -RM daily treatment  -RG    Mode of Treatment physical therapy  -RM physical therapy  -RG    Patient/Family/Caregiver Comments/Observations No significant c/o noted this date.  -RM Pt and nursing in agreement for skilled PT on this date.  -RG      Row Name 09/04/23 1501 09/04/23 1415       General Information    Patient Profile Reviewed yes  -RM yes  -RG    Existing Precautions/Restrictions fall  hx L patellar subluxation; peritoneal dialysis port R abdomen  -RM fall  hx L patellar subluxation; peritoneal dialysis port R abdomen  -RG      Row Name 09/04/23 1501 09/04/23 1415       Cognition/Psychosocial    Affect/Mental Status (Cognition) WFL  -RM WFL  -RG    Orientation Status (Cognition) oriented x 3  -RM oriented x 3  -RG    Follows Commands (Cognition) verbal cues/prompting required  -RM verbal cues/prompting required  -RG    Personal Safety Interventions gait belt;nonskid shoes/slippers when out of bed;fall prevention program maintained  -RM gait  belt;fall prevention program maintained;nonskid shoes/slippers when out of bed  -RG    Cognitive Function WFL  -RM WFL  -RG      Row Name 09/04/23 1501 09/04/23 1415       Transfer Assessment/Treatment    Transfers toilet transfer;stand pivot/stand step transfer;sit-stand transfer;stand-sit transfer  -RM toilet transfer;stand pivot/stand step transfer;sit-stand transfer;stand-sit transfer  -RG      Row Name 09/04/23 1501          Bed-Chair Transfer    Bed-Chair [place] (Transfers) minimum assist (75% patient effort);nonverbal cues (demo/gesture);verbal cues  -RM     Assistive Device (Bed-Chair Transfers) wheelchair  -RM       Row Name 09/04/23 1501 09/04/23 1415       Sit-Stand Transfer    Sit-Stand [place] (Transfers) verbal cues;nonverbal cues (demo/gesture);minimum assist (75% patient effort)  -RM verbal cues;nonverbal cues (demo/gesture);minimum assist (75% patient effort)  -RG    Assistive Device (Sit-Stand Transfers) walker, front-wheeled;wheelchair  -RM walker, front-wheeled;wheelchair  -RG      Row Name 09/04/23 1501 09/04/23 1415       Stand-Sit Transfer    Stand-Sit [place] (Transfers) verbal cues;nonverbal cues (demo/gesture);minimum assist (75% patient effort)  -RM verbal cues;nonverbal cues (demo/gesture);minimum assist (75% patient effort)  -RG    Assistive Device (Stand-Sit Transfers) walker, front-wheeled;wheelchair  -RM walker, front-wheeled;wheelchair  -RG      Row Name 09/04/23 1501 09/04/23 1415       Stand Pivot/Stand Step Transfer    Stand Pivot/Stand Step [place] (Transfers) verbal cues;nonverbal cues (demo/gesture);minimum assist (75% patient effort)  -RM verbal cues;nonverbal cues (demo/gesture);minimum assist (75% patient effort)  -RG    Assistive Device (Stand Pivot Stand Step Transfer) walker, front-wheeled  -RM walker, front-wheeled  -      Row Name 09/04/23 1501          Toilet Transfer    Type (Toilet Transfer) stand pivot/stand step  -     [place] Level  (Toilet Transfer) minimum assist (75% patient effort);nonverbal cues (demo/gesture);verbal cues  -RM     Assistive Device (Toilet Transfer) wheelchair;grab bars/safety frame  -RM       Row Name 09/04/23 1501 09/04/23 1415       Gait/Stairs (Locomotion)    Gait/Stairs Locomotion gait/ambulation independence;gait/ambulation assistive device;distance ambulated;gait pattern;gait deviations  -RM gait/ambulation independence;gait/ambulation assistive device;distance ambulated;gait pattern;gait deviations  -RG    [place] Level (Gait) verbal cues;nonverbal cues (demo/gesture);minimum assist (75% patient effort)  -RM verbal cues;nonverbal cues (demo/gesture);minimum assist (75% patient effort)  -RG    Assistive Device (Gait) walker, front-wheeled  -RM walker, front-wheeled  -RG    Distance in Feet (Gait) 40, 15  -RM --    Pattern (Gait) step-through  cues for incr stride length and to keep SHELIA inside walker  -RM step-through  cues for incr stride length and to keep SHELIA inside walker  -RG    Deviations/Abnormal Patterns (Gait) stride length decreased;gait speed decreased;casa decreased  -RM stride length decreased;gait speed decreased;casa decreased  -RG    Bilateral Gait Deviations forward flexed posture;heel strike decreased  -RM forward flexed posture;heel strike decreased  -RG    Comment, (Gait/Stairs) Shuffling pattern noted; Cues required for upright posture as patient demonstrates forward trunk flexion. Assistance required for RW management, although improved this date. Pt fatigues quickly and c/o knee buckling with fatigue.  -RM did not ambulate 2nd session.  -RG      Row Name 09/04/23 1501 09/04/23 1415       Safety Issues, Functional Mobility    Impairments Affecting Function (Mobility) balance;endurance/activity tolerance;strength;pain  -RM balance;endurance/activity tolerance;strength;pain  -RG      Row Name 09/04/23 1501 09/04/23 1415       Hip (Therapeutic Exercise)    Hip Strengthening (Therapeutic  Exercise) bilateral;flexion;extension;aBduction;aDduction;heel slides;marching while seated;sitting;1 lb free weight;resistance band;green;2 sets;10 repetitions  -RM bilateral;flexion;aBduction;aDduction;marching while standing;standing;10 repetitions;3 sets  -RG      Row Name 09/04/23 1501 09/04/23 1415       Knee (Therapeutic Exercise)    Knee Strengthening (Therapeutic Exercise) bilateral;flexion;extension;heel slides;marching while seated;LAQ (long arc quad);hamstring curls;sitting;1 lb free weight;resistance band;green;2 sets;10 repetitions  -RM flexion;bilateral;extension;marching while standing;standing;20 repititions  -RG      Row Name 09/04/23 1501 09/04/23 1415       Ankle (Therapeutic Exercise)    Ankle Strengthening (Therapeutic Exercise) bilateral;dorsiflexion;plantarflexion;sitting;1 lb free weight;2 sets;10 repetitions  -RM bilateral;dorsiflexion;plantarflexion;standing;20 repititions  -RG      Row Name 09/04/23 1501 09/04/23 1415       Positioning and Restraints    Pre-Treatment Position in bed  -RM in bed  -RG    Post Treatment Position wheelchair  -RM bed  -RG    In Bed -- notified nsg;supine;call light within reach;encouraged to call for assist;legs elevated  -RG    In Wheelchair sitting;with OT  -RM --      Row Name 09/04/23 1501 09/04/23 1415       Therapy Assessment/Plan (PT)    Patient's Goals For Discharge return home  -RM return home  -RG      Row Name 09/04/23 1501          Daily Progress Summary (PT)    Functional Goal Overall Progress (PT) progressing toward functional goals as expected  -RM     Daily Progress Summary (PT) Improvements noted in functional mobility and ambulation quality at this time, however, min A continually required for safety. LE weakness continually noted bilaterally. Continued skilled care required for further improvements to ensure maximum safe function upon D.C.  -RM     Impairments Still Limiting Function (PT) functional activity tolerance  impairment;pain;strength deficit  -RM     Recommendations (PT) Continue per current POC  -RM       Row Name 09/04/23 1501 09/04/23 1415       Therapy Plan Review/Discharge Plan (PT)    Anticipated Equipment Needs at Discharge (PT Eval) front wheeled walker;wheelchair  -RM front wheeled walker;wheelchair  -RG    Anticipated Discharge Disposition (PT) home with assist;home with home health  -RM home with assist;home with home health  -RG              User Key  (r) = Recorded By, (t) = Taken By, (c) = Cosigned By      Initials Name Provider Type    RM Patrizia Hope, PTA Physical Therapist Assistant    RG Rajiv Pagan, PTA Physical Therapist Assistant                  Wound 08/23/23 1923 Right posterior heel (Active)   Dressing Appearance open to air 09/04/23 0924   Closure None 09/04/23 0924   Base non-blanchable;maroon/purple 09/04/23 0924   Drainage Amount none 09/04/23 0924   Care, Wound barrier applied 09/04/23 0924   Dressing Care open to air 09/04/23 0924       Wound 08/23/23 2313 Left gluteal (Active)   Dressing Appearance open to air 09/04/23 0924   Closure None 09/04/23 0924   Base non-blanchable 09/04/23 0924   Periwound dry;non-blanchable 09/04/23 0924   Drainage Amount none 09/04/23 0924   Care, Wound barrier applied 09/04/23 0924   Dressing Care open to air 09/04/23 0924     Physical Therapy Education       Title: PT OT SLP Therapies (Done)       Topic: Physical Therapy (Done)       Point: Mobility training (Done)       Learning Progress Summary             Patient Acceptance, E,TB, VU by RM at 9/4/2023 1506    Acceptance, E,D, VU,NR by RG at 9/4/2023 1419    Acceptance, TB, NR by DL at 9/3/2023 2009    Acceptance, E,D, VU,NR by AG at 9/1/2023 1201    Acceptance, TB, NR by DL at 8/30/2023 2120    Acceptance, E,TB, VU by RM at 8/30/2023 1530    Acceptance, TB, NR by DL at 8/29/2023 2104    Acceptance, E,TB, VU by RM at 8/29/2023 1600    Acceptance, TB, NR by DL at 8/28/2023 2048    Acceptance,  E,TB, VU,DU by HC at 8/28/2023 1451    Acceptance, D,E, VU,NR by RG at 8/28/2023 1436    Acceptance, TB, NR by DL at 8/27/2023 2309    Acceptance, E,D, VU,NR by RG at 8/26/2023 1522    Acceptance, E,D, VU,NR by LL at 8/25/2023 1525    Acceptance, E,D, VU,NR by RG at 8/25/2023 1433    Acceptance, E, VU,NR by LB at 8/24/2023 1149                         Point: Home exercise program (Done)       Learning Progress Summary             Patient Acceptance, E,TB, VU by RM at 9/4/2023 1506    Acceptance, E,D, VU,NR by RG at 9/4/2023 1419    Acceptance, TB, NR by DL at 9/3/2023 2009    Acceptance, E,D, VU,NR by AG at 9/1/2023 1201    Acceptance, TB, NR by DL at 8/30/2023 2120    Acceptance, E,TB, VU by RM at 8/30/2023 1530    Acceptance, TB, NR by DL at 8/29/2023 2104    Acceptance, E,TB, VU by RM at 8/29/2023 1600    Acceptance, TB, NR by DL at 8/28/2023 2048    Acceptance, E,TB, VU,DU by HC at 8/28/2023 1451    Acceptance, D,E, VU,NR by RG at 8/28/2023 1436    Acceptance, TB, NR by DL at 8/27/2023 2309    Acceptance, E,D, VU,NR by RG at 8/26/2023 1522    Acceptance, E,D, VU,NR by LL at 8/25/2023 1525    Acceptance, E,D, VU,NR by RG at 8/25/2023 1433    Acceptance, E, VU,NR by LB at 8/24/2023 1149                         Point: Body mechanics (Done)       Learning Progress Summary             Patient Acceptance, E,TB, VU by RM at 9/4/2023 1506    Acceptance, E,D, VU,NR by RG at 9/4/2023 1419    Acceptance, TB, NR by DL at 9/3/2023 2009    Acceptance, E,D, VU,NR by AG at 9/1/2023 1201    Acceptance, TB, NR by DL at 8/30/2023 2120    Acceptance, E,TB, VU by RM at 8/30/2023 1530    Acceptance, TB, NR by DL at 8/29/2023 2104    Acceptance, E,TB, VU by RM at 8/29/2023 1600    Acceptance, TB, NR by DL at 8/28/2023 2048    Acceptance, E,TB, VU,DU by HC at 8/28/2023 1451    Acceptance, D,E, VU,NR by RG at 8/28/2023 1436    Acceptance, TB, NR by DL at 8/27/2023 2309    Acceptance, E,D, VU,NR by RG at 8/26/2023 1522    Acceptance,  E,D, VU,NR by LL at 8/25/2023 1525    Acceptance, E,D, VU,NR by RG at 8/25/2023 1433    Acceptance, E, VU,NR by LB at 8/24/2023 1149                         Point: Precautions (Done)       Learning Progress Summary             Patient Acceptance, E,TB, VU by RM at 9/4/2023 1506    Acceptance, E,D, VU,NR by RG at 9/4/2023 1419    Acceptance, TB, NR by DL at 9/3/2023 2009    Acceptance, E,D, VU,NR by AG at 9/1/2023 1201    Acceptance, TB, NR by DL at 8/30/2023 2120    Acceptance, E,TB, VU by RM at 8/30/2023 1530    Acceptance, TB, NR by DL at 8/29/2023 2104    Acceptance, E,TB, VU by RM at 8/29/2023 1600    Acceptance, TB, NR by DL at 8/28/2023 2048    Acceptance, E,TB, VU,DU by HC at 8/28/2023 1451    Acceptance, D,E, VU,NR by RG at 8/28/2023 1436    Acceptance, TB, NR by DL at 8/27/2023 2309    Acceptance, E,D, VU,NR by RG at 8/26/2023 1522    Acceptance, E,D, VU,NR by LL at 8/25/2023 1525    Acceptance, E,D, VU,NR by RG at 8/25/2023 1433    Acceptance, E, VU,NR by LB at 8/24/2023 1149                                         User Key       Initials Effective Dates Name Provider Type Discipline    LB 06/16/21 -  Christel Ferguson, PT Physical Therapist PT    AG 06/16/21 -  Anu Chin, PT Physical Therapist PT    LL 05/02/16 -  Carly Lundberg, PTA Physical Therapist Assistant PT    RM 02/17/23 -  Ptarizia Hope, PTA Physical Therapist Assistant PT    RG 06/16/21 -  Rajiv Pagan, PTA Physical Therapist Assistant PT    HC 01/20/23 -  Anaid Bo, PTA Physical Therapist Assistant PT    DL 03/16/22 -  Claudine Lundberg, RN Registered Nurse Nurse                    PT Recommendation and Plan    Frequency of Treatment (PT): 5 times per week  Anticipated Equipment Needs at Discharge (PT Eval): front wheeled walker, wheelchair  Daily Progress Summary (PT)  Functional Goal Overall Progress (PT): progressing toward functional goals as expected  Daily Progress Summary (PT): Improvements noted in  functional mobility and ambulation quality at this time, however, min A continually required for safety. LE weakness continually noted bilaterally. Continued skilled care required for further improvements to ensure maximum safe function upon D.C.  Impairments Still Limiting Function (PT): functional activity tolerance impairment, pain, strength deficit  Recommendations (PT): Continue per current POC               Time Calculation:      PT Charges       Row Name 09/04/23 1506 09/04/23 1419          Time Calculation    Start Time 0915  -RM 1245  -RG     Stop Time 1000  -RM 1330  -RG     Time Calculation (min) 45 min  -RM 45 min  -RG     PT Received On 09/04/23  -RM 09/04/23  -RG     PT - Next Appointment 09/04/23  - --     PT Goal Re-Cert Due Date 09/07/23  - --        Time Calculation- PT    Total Timed Code Minutes- PT 45 minute(s)  -RM 45 minute(s)  -RG               User Key  (r) = Recorded By, (t) = Taken By, (c) = Cosigned By      Initials Name Provider Type    Patrizia Sauceda PTA Physical Therapist Assistant    Rajiv Garner PTA Physical Therapist Assistant                    Therapy Charges for Today       Code Description Service Date Service Provider Modifiers Qty    91507525716 HC GAIT TRAINING EA 15 MIN 9/4/2023 Patrizia Hope PTA CQ 1    17176431512 HC PT THER PROC EA 15 MIN 9/4/2023 Patrizia Hope PTA CQ 1    93692293135 HC PT THERAPEUTIC ACT EA 15 MIN 9/4/2023 Patrizia Hope PTA CQ 1              PT G-Codes  AM-PAC 6 Clicks Score (PT): 17      Patrizia Hope PTA  9/4/2023

## 2023-09-04 NOTE — PLAN OF CARE
Goal Outcome Evaluation:  Plan of Care Reviewed With: patient with PD nurse at shift change, patient tolerating it with no c/o. Continues to participate in therapy, will continue to monitor.

## 2023-09-04 NOTE — THERAPY TREATMENT NOTE
Inpatient Rehabilitation - Occupational Therapy Treatment Note    EDWARD Veliz     Patient Name: Reny Elena  : 1960  MRN: [id number]    Today's Date: 2023                 Admit Date: 2023       No diagnosis found.    Patient Active Problem List   Diagnosis    Tibia/fibula fracture    Iron deficiency anemia    Type 2 diabetes mellitus with hyperglycemia, with long-term current use of insulin    Wound of right ankle    Cellulitis    Metabolic encephalopathy    History of non-ST elevation myocardial infarction (NSTEMI)    HTN (hypertension)    CVA (cerebral vascular accident)    Chronic diastolic CHF (congestive heart failure)    Decubitus ulcer of coccyx, unspecified pressure ulcer stage    Depression    Expressive aphasia    Impaired mobility and ADLs    Hyperkalemia    Subdural hematoma    Severe malnutrition    Cerebrovascular accident (CVA), unspecified mechanism    PRES (posterior reversible encephalopathy syndrome)    Debility       Past Medical History:   Diagnosis Date    Arthritis     Closed fracture of right fibula and tibia 2018    Depression     Diabetic ulcer of left foot associated with type 2 diabetes mellitus 2017    Diastolic dysfunction     Disease of thyroid gland     Elevated cholesterol     End stage renal disease     Essential hypertension     GERD (gastroesophageal reflux disease)     History of transfusion     Hyperlipidemia     Iron deficiency anemia 2018    PAD (peripheral artery disease)     Renal failure     Type 2 diabetes mellitus with hyperglycemia, with long-term current use of insulin 2018       Past Surgical History:   Procedure Laterality Date    ABDOMINAL SURGERY      BACK SURGERY      Post spinal diskectomy, osteophytectomy in one lumbar interspace    CATARACT EXTRACTION      Left      SECTION      DENTAL PROCEDURE      ENDOSCOPY      FRACTURE SURGERY      right leg    HYSTERECTOMY      INCISION AND DRAINAGE LEG Left 4/15/2017     Procedure: INCISION AND DRAINAGE LOWER EXTREMITY;  Surgeon: Basilio Morris MD;  Location:  [facility] OR;  Service:     INCISION AND DRAINAGE LEG Left 5/26/2017    Procedure: Irrigation and Debridment abcess diabetic wound left foot with deep culture;  Surgeon: Basilio Morris MD;  Location: [facility] OR;  Service:     INSERTION PERITONEAL DIALYSIS CATHETER N/A 12/16/2021    Procedure: INSERTION PERITONEAL DIALYSIS CATHETER LAPAROSCOPIC;  Surgeon: Edy Glover MD;  Location: [facility] OR;  Service: General;  Laterality: N/A;    KNEE ARTHROSCOPY Right     ORIF TIBIA FRACTURE Right 12/28/2018    Procedure: TIBIAL  FRACTURE OPEN REDUCTION INTERNAL FIXATION;  Surgeon: Jung Barragan MD;  Location: [facility] OR;  Service: Orthopedics    TOE AMPUTATION Right     5th    TUBAL ABDOMINAL LIGATION               IRF OT ASSESSMENT FLOWSHEET (last 12 hours)       IRF OT Evaluation and Treatment       Row Name 09/04/23 1343          OT Time and Intention    Document Type daily treatment  -BF     Mode of Treatment occupational therapy  -BF     Patient Effort good  -BF     Symptoms Noted During/After Treatment fatigue  -BF       Row Name 09/04/23 1343          General Information    Existing Precautions/Restrictions fall;other (see comments)  peritoneal dialysis port at ABD, abdominal binder, L patella subluxation  -BF       Row Name 09/04/23 1343          Cognition/Psychosocial    Orientation Status (Cognition) oriented x 3  -BF     Follows Commands (Cognition) follows one-step commands;verbal cues/prompting required  -BF       Row Name 09/04/23 1343          Grooming    [place] Level (Grooming) set up  -BF     Position (Grooming) supported sitting  -BF       Row Name 09/04/23 1343          Chair-Bed Transfer    Chair-Bed [place] (Transfers) minimum assist (75% patient effort);verbal cues  -BF     Assistive Device (Chair-Bed Transfers) wheelchair  -BF       Row Name 09/04/23 1343          Motor Skills    Motor  Control/Coordination Interventions fine motor manipulation/dexterity activities;gross motor coordination activities;therapeutic exercise/ROM  BUE GMC/FMC theract, strengthening; BUE rickshaw 15 lbs X15X5, flexbar therex, BUE PRE 3 mins X3  -BF       Row Name 09/04/23 1343          Positioning and Restraints    In Bed supine;call light within reach;encouraged to call for assist;legs elevated  -BF               User Key  (r) = Recorded By, (t) = Taken By, (c) = Cosigned By      Initials Name Effective Dates    BF Yael Mendez, KORINA 07/11/23 -                      Occupational Therapy Education       Title: PT OT SLP Therapies (In Progress)       Topic: Occupational Therapy (Done)       Point: ADL training (Done)       Description:   Instruct learner(s) on proper safety adaptation and remediation techniques during self care or transfers.   Instruct in proper use of assistive devices.                  Learning Progress Summary             Patient Acceptance, E, VU,NR by BF at 9/4/2023 1343    Acceptance, TB, NR by DL at 9/3/2023 2009    Acceptance, E,D, VU,NR by TM at 9/1/2023 1251    Acceptance, E, VU,NR by BF at 8/31/2023 1430    Acceptance, E, NR by BF at 8/30/2023 1235    Acceptance, TB, NR by DL at 8/29/2023 2104    Acceptance, E, VU,NR by BF at 8/29/2023 1447    Acceptance, TB, NR by DL at 8/28/2023 2048    Acceptance, E,D, NR,VU by CJ at 8/28/2023 1505    Acceptance, TB, NR by DL at 8/27/2023 2309    Acceptance, E, VU,NR by HB at 8/26/2023 1417    Acceptance, E, VU,NR by BF at 8/25/2023 1438    Acceptance, E, VU,NR by BF at 8/24/2023 1450                         Point: Precautions (Done)       Description:   Instruct learner(s) on prescribed precautions during self-care and functional transfers.                  Learning Progress Summary             Patient Acceptance, E, VU,NR by BF at 9/4/2023 1343    Acceptance, TB, NR by DL at 9/3/2023 2009    Acceptance, E,D, VU,NR by TM at 9/1/2023 1251     Acceptance, E, VU,NR by BF at 8/31/2023 1430    Acceptance, E, NR by BF at 8/30/2023 1235    Acceptance, TB, NR by DL at 8/29/2023 2104    Acceptance, E, VU,NR by BF at 8/29/2023 1447    Acceptance, TB, NR by DL at 8/28/2023 2048    Acceptance, E,D, NR,VU by CJ at 8/28/2023 1505    Acceptance, TB, NR by DL at 8/27/2023 2309    Acceptance, E, VU,NR by HB at 8/26/2023 1417    Acceptance, E, VU,NR by BF at 8/25/2023 1438    Acceptance, E, VU,NR by BF at 8/24/2023 1450                                         User Key       Initials Effective Dates Name Provider Type Discipline    BF 07/11/23 -  Yael Mendez, OT Occupational Therapist OT    CJ 06/16/21 -  Nichelle Lagunas, OT Occupational Therapist OT    TM 06/16/21 -  Olivia Rahman, OT Occupational Therapist OT    HB 05/25/21 -  Ella Wallace, OT Occupational Therapist OT    DL 03/16/22 -  Claudine Lundberg, RN Registered Nurse Nurse                        OT Recommendation and Plan    Planned Therapy Interventions (OT): activity tolerance training, adaptive equipment training, BADL retraining, ROM/therapeutic exercise, strengthening exercise, transfer/mobility retraining                    Time Calculation:      Time Calculation- OT       Row Name 09/04/23 1346             Time Calculation- OT    OT Start Time 1000  -BF      OT Stop Time 1140  -BF      OT Time Calculation (min) 100 min  -BF      Total Timed Code Minutes-  minute(s)  -BF      OT Non-Billable Time (min) 10 min  -BF                User Key  (r) = Recorded By, (t) = Taken By, (c) = Cosigned By      Initials Name Provider Type    BF Yael Mendez OT Occupational Therapist                  Therapy Charges for Today       Code Description Service Date Service Provider Modifiers Qty    50105092303 HC OT SELF CARE/MGMT/TRAIN EA 15 MIN 9/4/2023 Yael Mendez OT  1    54184759706 HC OT THERAPEUTIC ACT EA 15 MIN 9/4/2023 Yael Mendez OT  3    34959317156 HC OT  THER PROC EA 15 MIN 9/4/2023 Yael Mendez, OT  3                     Yael Mendez, OT  9/4/2023

## 2023-09-04 NOTE — PLAN OF CARE
Goal Outcome Evaluation:       Problem: Rehabilitation (IRF) Plan of Care  Goal: Plan of Care Review  Outcome: Ongoing, Progressing  Flowsheets  Taken 9/3/2023 2009 by Claudine Lundberg, RN  Plan of Care Reviewed With: patient  Taken 9/3/2023 1701 by Vicky Sanz, RN  Progress: no change  Goal: Patient-Specific Goal (Individualized)  Outcome: Ongoing, Progressing  Goal: Absence of New-Onset Illness or Injury  Outcome: Ongoing, Progressing  Goal: Optimal Comfort and Wellbeing  Outcome: Ongoing, Progressing  Goal: Home and Community Transition Plan Established  Outcome: Ongoing, Progressing

## 2023-09-05 LAB
GLUCOSE BLDC GLUCOMTR-MCNC: 121 MG/DL (ref 70–130)
GLUCOSE BLDC GLUCOMTR-MCNC: 229 MG/DL (ref 70–130)
GLUCOSE BLDC GLUCOMTR-MCNC: 231 MG/DL (ref 70–130)
GLUCOSE BLDC GLUCOMTR-MCNC: 278 MG/DL (ref 70–130)

## 2023-09-05 PROCEDURE — 97530 THERAPEUTIC ACTIVITIES: CPT

## 2023-09-05 PROCEDURE — 63710000001 INSULIN LISPRO (HUMAN) PER 5 UNITS: Performed by: INTERNAL MEDICINE

## 2023-09-05 PROCEDURE — 82948 REAGENT STRIP/BLOOD GLUCOSE: CPT

## 2023-09-05 PROCEDURE — 97110 THERAPEUTIC EXERCISES: CPT

## 2023-09-05 PROCEDURE — 63710000001 INSULIN GLARGINE PER 5 UNITS: Performed by: INTERNAL MEDICINE

## 2023-09-05 PROCEDURE — 99231 SBSQ HOSP IP/OBS SF/LOW 25: CPT | Performed by: FAMILY MEDICINE

## 2023-09-05 PROCEDURE — 97535 SELF CARE MNGMENT TRAINING: CPT

## 2023-09-05 PROCEDURE — 97116 GAIT TRAINING THERAPY: CPT

## 2023-09-05 PROCEDURE — 25010000002 HEPARIN (PORCINE) PER 1000 UNITS: Performed by: INTERNAL MEDICINE

## 2023-09-05 RX ADMIN — GENTAMICIN SULFATE 1 APPLICATION: 1 OINTMENT TOPICAL at 21:21

## 2023-09-05 RX ADMIN — Medication 1 TABLET: at 09:31

## 2023-09-05 RX ADMIN — HEPARIN SODIUM 5000 UNITS: 5000 INJECTION INTRAVENOUS; SUBCUTANEOUS at 09:21

## 2023-09-05 RX ADMIN — CASTOR OIL AND BALSAM, PERU 1 APPLICATION: 788; 87 OINTMENT TOPICAL at 21:24

## 2023-09-05 RX ADMIN — ROPINIROLE HYDROCHLORIDE 0.5 MG: 0.25 TABLET, FILM COATED ORAL at 21:20

## 2023-09-05 RX ADMIN — INSULIN LISPRO 8 UNITS: 100 INJECTION, SOLUTION INTRAVENOUS; SUBCUTANEOUS at 11:54

## 2023-09-05 RX ADMIN — METOPROLOL SUCCINATE 25 MG: 25 TABLET, EXTENDED RELEASE ORAL at 09:31

## 2023-09-05 RX ADMIN — HEPARIN SODIUM 5000 UNITS: 5000 INJECTION INTRAVENOUS; SUBCUTANEOUS at 21:20

## 2023-09-05 RX ADMIN — PANTOPRAZOLE SODIUM 40 MG: 40 TABLET, DELAYED RELEASE ORAL at 09:31

## 2023-09-05 RX ADMIN — ATORVASTATIN CALCIUM 80 MG: 40 TABLET, FILM COATED ORAL at 21:20

## 2023-09-05 RX ADMIN — FLUOXETINE HYDROCHLORIDE 40 MG: 20 CAPSULE ORAL at 09:21

## 2023-09-05 RX ADMIN — HYDROCODONE BITARTRATE AND ACETAMINOPHEN 1 TABLET: 10; 325 TABLET ORAL at 09:21

## 2023-09-05 RX ADMIN — AMLODIPINE BESYLATE 5 MG: 5 TABLET ORAL at 21:20

## 2023-09-05 RX ADMIN — AMLODIPINE BESYLATE 5 MG: 5 TABLET ORAL at 09:21

## 2023-09-05 RX ADMIN — LEVOTHYROXINE SODIUM 50 MCG: 50 TABLET ORAL at 09:31

## 2023-09-05 RX ADMIN — ASPIRIN 81 MG: 81 TABLET, COATED ORAL at 09:21

## 2023-09-05 RX ADMIN — INSULIN LISPRO 8 UNITS: 100 INJECTION, SOLUTION INTRAVENOUS; SUBCUTANEOUS at 21:19

## 2023-09-05 RX ADMIN — INSULIN GLARGINE 20 UNITS: 100 INJECTION, SOLUTION SUBCUTANEOUS at 09:21

## 2023-09-05 RX ADMIN — FLUTICASONE PROPIONATE 2 SPRAY: 50 SPRAY, METERED NASAL at 09:30

## 2023-09-05 RX ADMIN — ISOSORBIDE MONONITRATE 30 MG: 30 TABLET, EXTENDED RELEASE ORAL at 09:21

## 2023-09-05 RX ADMIN — LISINOPRIL 20 MG: 10 TABLET ORAL at 21:20

## 2023-09-05 RX ADMIN — INSULIN LISPRO 5 UNITS: 100 INJECTION, SOLUTION INTRAVENOUS; SUBCUTANEOUS at 09:21

## 2023-09-05 RX ADMIN — ROPINIROLE HYDROCHLORIDE 0.5 MG: 0.25 TABLET, FILM COATED ORAL at 09:21

## 2023-09-05 RX ADMIN — TRAZODONE HYDROCHLORIDE 50 MG: 50 TABLET ORAL at 21:20

## 2023-09-05 NOTE — PLAN OF CARE
Goal Outcome Evaluation:  Plan of Care Reviewed With: patient continues to participate in therapy. PD here now initiating dialysis, patient tolerates it well, will continue to monitor.

## 2023-09-05 NOTE — PROGRESS NOTES
[facility]  PROGRESS NOTE     Patient Identification:  Name:  Reny Elena  Age:  63 y.o.  Sex:  female  :  1960  MRN:  [id number]  Visit Number:  [id number]  ROOM: [facility]     Primary Care Provider:  Susan Stephens APRN    Length of stay in inpatient status:  13    Subjective     Chief Compliant:  No chief complaint on file.      History of Presenting Illness: 53-year-old female being treated for debility.  Patient states she is doing reasonably well and is making good progress in her therapy goals.  No new complaints    Objective     Current Hospital Meds:amLODIPine, 5 mg, Oral, BID  aspirin, 81 mg, Oral, Daily  atorvastatin, 80 mg, Oral, Nightly  castor oil-balsam peru, 1 application , Topical, Q12H  epoetin earnest/earnest-epbx, 10,000 Units, Subcutaneous, Once per day on   FLUoxetine, 40 mg, Oral, Daily  fluticasone, 2 spray, Each Nare, Daily  gentamicin, 1 application , Topical, Q24H  heparin (porcine), 5,000 Units, Subcutaneous, Q12H  insulin glargine, 20 Units, Subcutaneous, Daily  insulin lispro, 3-14 Units, Subcutaneous, TID With Meals  isosorbide mononitrate, 30 mg, Oral, Daily  levothyroxine, 50 mcg, Oral, Daily  lisinopril, 20 mg, Oral, Nightly  metoprolol succinate XL, 25 mg, Oral, Daily  multivitamin, 1 tablet, Oral, Daily  pantoprazole, 40 mg, Oral, Daily  rOPINIRole, 0.5 mg, Oral, BID  traZODone, 50 mg, Oral, Nightly       ----------------------------------------------------------------------------------------------------------------------  Vital Signs:  Temp:  [98.1 °F (36.7 °C)-99 °F (37.2 °C)] 98.1 °F (36.7 °C)  Heart Rate:  [70-77] 77  Resp:  [14-18] 14  BP: (115-190)/(62-90) 190/88  SpO2:  [92 %-96 %] 95 %  on   ;   Device (Oxygen Therapy): room air  Body mass index is 21.28 kg/m².    Wt Readings from Last 3 Encounters:   23 56.2 kg (124 lb)   23 56.7 kg (124 lb 14.4 oz)   23 42.2 kg (93 lb)     Intake & Output (last 3 days)           0701 09/03 0700 09/03 0701 09/04 0700 09/04 0701 09/05 0700 09/05 0701 09/06 0700    P.O. 1044 1086 780 120    Total Intake(mL/kg) 1044 (18.6) 1086 (19.3) 780 (13.9) 120 (2.1)    Urine (mL/kg/hr) 0 (0)  0 (0)     Stool 0       Dialysis 2325  1315 791    Total Output 2325  1315 791    Net -1281 +1086 -535 -671            Urine Unmeasured Occurrence 4 x 7 x 5 x     Stool Unmeasured Occurrence 1 x 1 x            Diet: Regular/House Diet, Diabetic Diets, Fluid Restriction (240 mL/tray) Diets; Consistent Carbohydrate; Other (Specify mL/day) (800 cc restriction daily); Texture: Soft to Chew (NDD 3); Soft to Chew: Chopped Meat; Fluid Consistency: Thin (IDDSI ...  ----------------------------------------------------------------------------------------------------------------------  Physical exam:  Constitutional: No acute distress  HEENT: Normocephalic atraumatic  Neck: Supple  Cardiovascular: Regular rate and rhythm  Pulmonary/Chest: Clear to auscultation  Abdominal: Positive bowel sounds soft.   Musculoskeletal: No arthropathy  Neurological: No focal deficits  Skin: No rash  Peripheral vascular: No edema  Genitourinary:  ----------------------------------------------------------------------------------------------------------------------    Last echocardiogram:  Results for orders placed during the hospital encounter of 08/19/23    Adult Transthoracic Echo Complete W/ Cont if Necessary Per Protocol    Interpretation Summary  •  Left ventricular systolic function is normal. Left ventricular ejection fraction appears to be 56 - 60%.  •  Left ventricular diastolic function is consistent with (grade I) impaired relaxation.  •  Left atrial volume is mildly increased.  •  Moderate mitral valve stenosis is present.  •  Estimated right ventricular systolic pressure from tricuspid regurgitation is mildly elevated (35-45 mmHg).  •  Mild pulmonary hypertension is present.  •  There is no evidence of pericardial  effusion.    ----------------------------------------------------------------------------------------------------------------------  Results from last 7 days   Lab Units 09/02/23 0100 08/31/23  1618 08/31/23  0239   WBC 10*3/mm3 4.65  --  4.32   HEMOGLOBIN g/dL 8.0*  --  7.6*   HEMATOCRIT % 26.3*  --  26.8*   MCV fL 97.4*  --  105.1*   MCHC g/dL 30.4*  --  28.4*   PLATELETS 10*3/mm3 147 165 100*         Results from last 7 days   Lab Units 09/02/23 0100 08/31/23  0239   SODIUM mmol/L 131* 129*   POTASSIUM mmol/L 4.8 4.9   CHLORIDE mmol/L 95* 96*   CO2 mmol/L 24.0 22.4   BUN mg/dL 109* 105*   CREATININE mg/dL 5.17* 5.12*   CALCIUM mg/dL 8.0* 7.7*   GLUCOSE mg/dL 235* 166*   Estimated Creatinine Clearance: 9.6 mL/min (A) (by C-G formula based on SCr of 5.17 mg/dL (H)).  No results found for: AMMONIA              Glucose   Date/Time Value Ref Range Status   09/05/2023 0550 229 (H) 70 - 130 mg/dL Final   09/04/2023 1955 287 (H) 70 - 130 mg/dL Final   09/04/2023 1629 111 70 - 130 mg/dL Final   09/04/2023 1111 223 (H) 70 - 130 mg/dL Final   09/04/2023 0558 220 (H) 70 - 130 mg/dL Final   09/03/2023 1940 255 (H) 70 - 130 mg/dL Final   09/03/2023 1559 120 70 - 130 mg/dL Final   09/03/2023 1108 164 (H) 70 - 130 mg/dL Final     Lab Results   Component Value Date    .500 (H) 08/29/2023    FREET4 0.32 (L) 08/29/2023     No results found for: PREGTESTUR, PREGSERUM, HCG, HCGQUANT  Pain Management Panel  More data exists         Latest Ref Rng & Units 3/5/2023 5/11/2022   Pain Management Panel   Amphetamine, Urine Qual Negative Negative  Negative    Barbiturates Screen, Urine Negative Negative  Negative    Benzodiazepine Screen, Urine Negative Negative  Positive    Buprenorphine, Screen, Urine Negative Negative  Negative    Cocaine Screen, Urine Negative Negative  Negative    Methadone Screen , Urine Negative Negative  Negative    Methamphetamine, Ur Negative Negative  Negative      Brief Urine Lab Results  (Last result in  the past 365 days)        Color   Clarity   Blood   Leuk Est   Nitrite   Protein   CREAT   Urine HCG        08/19/23 1337 Yellow   Clear   Negative   Negative   Negative   100 mg/dL (2+)                 No results found for: BLOODCX      No results found for: URINECX  No results found for: WOUNDCX  No results found for: STOOLCX        I have personally looked at the labs and they are summarized above.  ----------------------------------------------------------------------------------------------------------------------  Detailed radiology reports for the last 24 hours:    Imaging Results (Last 24 Hours)       ** No results found for the last 24 hours. **          Final impressions for the last 30 days of radiology reports:    XR Chest AP    Result Date: 8/19/2023   No evidence of acute disease in the chest.  This report was finalized on 8/19/2023 2:53 PM by Steven Melton MD.     I have personally looked at the radiology images and read the final radiology report.    Assessment & Plan    Debility--patient requiring minimum to moderate assistance for transfers.  Ambulated 40 feet with rolling walker and minimum assist yesterday.  Patient requiring set up for lower body dressing; quiring minimum assistance for lower body ADLs and toileting at this time    End-stage renal disease continue peritoneal dialysis.  Appreciate nephrology input    Hypertension better controlled      Diabetes mellitus control still suboptimal we will make a minor adjustment in insulin schedule.    Hypothyroidism continue Synthroid    Remote history of CVA continue statin therapy and aspirin    CHF preserved EF compensated    VTE Prophylaxis:   Mechanical Order History:        Ordered        08/31/23 0652  Place Sequential Compression Device  Once,   Status:  Canceled            08/31/23 0652  Maintain Sequential Compression Device  Continuous,   Status:  Canceled                          Pharmalogical Order History:        Ordered     Dose  Route Frequency Stop    09/02/23 1107  heparin (porcine) 5000 UNIT/ML injection 5,000 Units         5,000 Units SC Every 12 Hours Scheduled --    08/24/23 1130  heparin (porcine) 5000 UNIT/ML injection 5,000 Units  Status:  Discontinued         5,000 Units SC Every 12 Hours Scheduled 08/31/23 0652                        Yeison Braden MD  [facility]  09/05/23  09:44 EDT

## 2023-09-05 NOTE — PLAN OF CARE
Goal Outcome Evaluation:       Problem: Rehabilitation (IRF) Plan of Care  Goal: Plan of Care Review  Outcome: Ongoing, Progressing  Flowsheets  Taken 9/4/2023 2020 by Claudine Lundberg, RN  Plan of Care Reviewed With: patient  Taken 9/3/2023 1701 by Vicky Sanz, RN  Progress: no change  Goal: Patient-Specific Goal (Individualized)  Outcome: Ongoing, Progressing  Goal: Absence of New-Onset Illness or Injury  Outcome: Ongoing, Progressing  Goal: Optimal Comfort and Wellbeing  Outcome: Ongoing, Progressing  Goal: Home and Community Transition Plan Established  Outcome: Ongoing, Progressing

## 2023-09-05 NOTE — THERAPY TREATMENT NOTE
Inpatient Rehabilitation - Physical Therapy Treatment Note       EDWARD Veliz     Patient Name: Reny Elena  : 1960  MRN: [id number]    Today's Date: 2023                    Admit Date: 2023      Visit Dx:   No diagnosis found.    Patient Active Problem List   Diagnosis    Tibia/fibula fracture    Iron deficiency anemia    Type 2 diabetes mellitus with hyperglycemia, with long-term current use of insulin    Wound of right ankle    Cellulitis    Metabolic encephalopathy    History of non-ST elevation myocardial infarction (NSTEMI)    HTN (hypertension)    CVA (cerebral vascular accident)    Chronic diastolic CHF (congestive heart failure)    Decubitus ulcer of coccyx, unspecified pressure ulcer stage    Depression    Expressive aphasia    Impaired mobility and ADLs    Hyperkalemia    Subdural hematoma    Severe malnutrition    Cerebrovascular accident (CVA), unspecified mechanism    PRES (posterior reversible encephalopathy syndrome)    Debility       Past Medical History:   Diagnosis Date    Arthritis     Closed fracture of right fibula and tibia 2018    Depression     Diabetic ulcer of left foot associated with type 2 diabetes mellitus 2017    Diastolic dysfunction     Disease of thyroid gland     Elevated cholesterol     End stage renal disease     Essential hypertension     GERD (gastroesophageal reflux disease)     History of transfusion     Hyperlipidemia     Iron deficiency anemia 2018    PAD (peripheral artery disease)     Renal failure     Type 2 diabetes mellitus with hyperglycemia, with long-term current use of insulin 2018       Past Surgical History:   Procedure Laterality Date    ABDOMINAL SURGERY      BACK SURGERY      Post spinal diskectomy, osteophytectomy in one lumbar interspace    CATARACT EXTRACTION      Left      SECTION      DENTAL PROCEDURE      ENDOSCOPY      FRACTURE SURGERY      right leg    HYSTERECTOMY      INCISION AND DRAINAGE LEG Left  "4/15/2017    Procedure: INCISION AND DRAINAGE LOWER EXTREMITY;  Surgeon: Basilio Morris MD;  Location: [facility] OR;  Service:     INCISION AND DRAINAGE LEG Left 5/26/2017    Procedure: Irrigation and Debridment abcess diabetic wound left foot with deep culture;  Surgeon: Basilio Morris MD;  Location: [facility] OR;  Service:     INSERTION PERITONEAL DIALYSIS CATHETER N/A 12/16/2021    Procedure: INSERTION PERITONEAL DIALYSIS CATHETER LAPAROSCOPIC;  Surgeon: Edy Glover MD;  Location: [facility] OR;  Service: General;  Laterality: N/A;    KNEE ARTHROSCOPY Right     ORIF TIBIA FRACTURE Right 12/28/2018    Procedure: TIBIAL  FRACTURE OPEN REDUCTION INTERNAL FIXATION;  Surgeon: Jung Barragan MD;  Location: [facility] OR;  Service: Orthopedics    TOE AMPUTATION Right     5th    TUBAL ABDOMINAL LIGATION         PT ASSESSMENT (last 12 hours)       IRF PT Evaluation and Treatment       Row Name 09/05/23 1602 09/05/23 1424       PT Time and Intention    Document Type daily treatment  -RM daily treatment  -RG    Mode of Treatment physical therapy  -RM physical therapy  -RG    Patient/Family/Caregiver Comments/Observations Pt reports RLE feels \"stiff\" in AM, however, pt states she feels good today.  -RM Pt and nursing in agreement for skilled PT on this date.  -RG      Row Name 09/05/23 1602 09/05/23 142       General Information    Patient Profile Reviewed yes  -RM yes  -RG    Existing Precautions/Restrictions fall  hx L patellar subluxation; peritoneal dialysis port R abdomen  -RM fall  hx L patellar subluxation; peritoneal dialysis port R abdomen  -RG      Row Name 09/05/23 1602 09/05/23 1424       Cognition/Psychosocial    Affect/Mental Status (Cognition) WFL  -RM WFL  -RG    Orientation Status (Cognition) oriented x 3  -RM oriented x 3  -RG    Follows Commands (Cognition) verbal cues/prompting required  -RM verbal cues/prompting required  -RG    Personal Safety Interventions gait belt;nonskid shoes/slippers when out of " bed;fall prevention program maintained  -RM gait belt;fall prevention program maintained;nonskid shoes/slippers when out of bed  -RG    Cognitive Function WFL  -RM WFL  -RG      Row Name 09/05/23 1602          Bed Mobility    Supine-Sit McMullen (Bed Mobility) contact guard  -RM     Sit-Supine McMullen (Bed Mobility) contact guard  -RM     Assistive Device (Bed Mobility) bed rails  -RM       Row Name 09/05/23 1602 09/05/23 1424       Transfer Assessment/Treatment    Transfers toilet transfer;stand pivot/stand step transfer;sit-stand transfer;stand-sit transfer  -RM toilet transfer;stand pivot/stand step transfer;sit-stand transfer;stand-sit transfer  -RG      Row Name 09/05/23 1602 09/05/23 1424       Bed-Chair Transfer    Bed-Chair McMullen (Transfers) minimum assist (75% patient effort);nonverbal cues (demo/gesture);verbal cues  -RM minimum assist (75% patient effort);nonverbal cues (demo/gesture);verbal cues  -RG    Assistive Device (Bed-Chair Transfers) wheelchair  -RM wheelchair  -RG      Row Name 09/05/23 1602          Chair-Bed Transfer    Chair-Bed McMullen (Transfers) minimum assist (75% patient effort)  -RM     Assistive Device (Chair-Bed Transfers) wheelchair;walker, front-wheeled  -       Row Name 09/05/23 1602 09/05/23 1424       Sit-Stand Transfer    Sit-Stand McMullen (Transfers) verbal cues;nonverbal cues (demo/gesture);minimum assist (75% patient effort)  -RM verbal cues;nonverbal cues (demo/gesture);minimum assist (75% patient effort)  -RG    Assistive Device (Sit-Stand Transfers) walker, front-wheeled;wheelchair  -RM walker, front-wheeled;wheelchair  -RG      Row Name 09/05/23 1602 09/05/23 1424       Stand-Sit Transfer    Stand-Sit McMullen (Transfers) verbal cues;nonverbal cues (demo/gesture);minimum assist (75% patient effort)  -RM verbal cues;nonverbal cues (demo/gesture);minimum assist (75% patient effort)  -RG    Assistive Device (Stand-Sit Transfers) walker,  front-wheeled;wheelchair  -RM walker, front-wheeled;wheelchair  -RG      Row Name 09/05/23 1602 09/05/23 1424       Stand Pivot/Stand Step Transfer    Stand Pivot/Stand Step Logan (Transfers) verbal cues;nonverbal cues (demo/gesture);minimum assist (75% patient effort)  -RM verbal cues;nonverbal cues (demo/gesture);minimum assist (75% patient effort)  -RG    Assistive Device (Stand Pivot Stand Step Transfer) walker, front-wheeled  -RM walker, front-wheeled  -RG      Row Name 09/05/23 1602 09/05/23 1424       Gait/Stairs (Locomotion)    Gait/Stairs Locomotion gait/ambulation independence;gait/ambulation assistive device;distance ambulated;gait pattern;gait deviations  -RM gait/ambulation independence;gait/ambulation assistive device;distance ambulated;gait pattern;gait deviations  -RG    Logan Level (Gait) verbal cues;nonverbal cues (demo/gesture);minimum assist (75% patient effort)  -RM verbal cues;nonverbal cues (demo/gesture);minimum assist (75% patient effort)  -RG    Assistive Device (Gait) walker, front-wheeled  -RM walker, front-wheeled  -RG    Distance in Feet (Gait) 100  -RM 80'  -RG    Pattern (Gait) step-through  cues for incr stride length and to keep SHELIA inside walker  -RM step-through  cues for incr stride length and to keep SHELIA inside walker  -RG    Deviations/Abnormal Patterns (Gait) stride length decreased;gait speed decreased;casa decreased  -RM stride length decreased;gait speed decreased;casa decreased  -RG    Bilateral Gait Deviations forward flexed posture;heel strike decreased  -RM forward flexed posture;heel strike decreased  -RG    Comment, (Gait/Stairs) Improved ambulation quality noted as patient able to maintain upright posture with fair RW management. Forward trunk flexion and shuffling pattern noted with fatigue.  -RM Pt c/o fatigue by 2nd session of PT.  She demonstrated decreased step length and shuffling gait pattern.  Cues given for body mechanics and to increase  step length.  -      Row Name 09/05/23 1602 09/05/23 1424       Safety Issues, Functional Mobility    Impairments Affecting Function (Mobility) balance;endurance/activity tolerance;strength;pain  -RM balance;endurance/activity tolerance;strength;pain  -      Row Name 09/05/23 1602 09/05/23 1424       Hip (Therapeutic Exercise)    Hip Strengthening (Therapeutic Exercise) bilateral;flexion;extension;aBduction;aDduction;heel slides;marching while seated;sitting;2 lb free weight;resistance band;green;2 sets;10 repetitions  -RM bilateral;flexion;aBduction;aDduction;marching while seated;marching while standing;standing;10 repetitions;2 sets;sitting  -      Row Name 09/05/23 1602 09/05/23 1424       Knee (Therapeutic Exercise)    Knee Strengthening (Therapeutic Exercise) bilateral;flexion;extension;heel slides;marching while seated;hamstring curls;LAQ (long arc quad);sitting;2 lb free weight;resistance band;green;2 sets;10 repetitions  -RM bilateral;flexion;extension;marching while seated;marching while standing;LAQ (long arc quad);hamstring curls;sitting;standing;10 repetitions;2 sets  -      Row Name 09/05/23 1602          Ankle (Therapeutic Exercise)    Ankle Strengthening (Therapeutic Exercise) bilateral;dorsiflexion;plantarflexion;sitting;2 lb free weight;2 sets;10 repetitions  -RM       Row Name 09/05/23 1602 09/05/23 1424       Positioning and Restraints    Pre-Treatment Position in bed  -RM in bed  -RG    Post Treatment Position bed  -RM wheelchair  -RG    In Bed supine;call light within reach;encouraged to call for assist  -RM --    In Wheelchair -- sitting;notified nsg;with OT;legs elevated  -      Row Name 09/05/23 1602 09/05/23 1424       Therapy Assessment/Plan (PT)    Patient's Goals For Discharge return home  -RM return home  -      Row Name 09/05/23 1602 09/05/23 1424       Daily Progress Summary (PT)    Functional Goal Overall Progress (PT) progressing toward functional goals as expected  -RM  --    Daily Progress Summary (PT) Improvements noted in functional mobility independence and ambulation distance this date. Assistance continaully required for safety, however. Continued skilled care required for further improvements to ensure maximum safe function upon D/C.  -RM --    Impairments Still Limiting Function (PT) functional activity tolerance impairment;pain;strength deficit  -RM functional activity tolerance impairment;pain;strength deficit  -RG    Recommendations (PT) Continue per current POC  -RM --      Row Name 09/05/23 1602 09/05/23 1424       Therapy Plan Review/Discharge Plan (PT)    Anticipated Equipment Needs at Discharge (PT Eval) front wheeled walker;wheelchair  -RM front wheeled walker;wheelchair  -RG    Anticipated Discharge Disposition (PT) home with assist;home with home health  -RM home with assist;home with home health  -RG              User Key  (r) = Recorded By, (t) = Taken By, (c) = Cosigned By      Initials Name Provider Type    RM Patrizia Hope, PTA Physical Therapist Assistant    RG Rajiv Pagan, SHITAL Physical Therapist Assistant                  Wound 08/23/23 1923 Right posterior heel (Active)   Dressing Appearance open to air 09/05/23 0921   Closure None 09/05/23 0921   Base non-blanchable;maroon/purple 09/05/23 0921   Drainage Amount none 09/05/23 0921   Care, Wound barrier applied 09/05/23 0921   Dressing Care open to air 09/05/23 0921       Wound 08/23/23 2313 Left gluteal (Active)   Dressing Appearance open to air 09/05/23 0921   Closure None 09/05/23 0921   Base non-blanchable 09/05/23 0921   Periwound dry;non-blanchable 09/05/23 0921   Drainage Amount none 09/05/23 0921   Care, Wound barrier applied 09/05/23 0921   Dressing Care open to air 09/05/23 0921     Physical Therapy Education       Title: PT OT SLP Therapies (In Progress)       Topic: Physical Therapy (Done)       Point: Mobility training (Done)       Learning Progress Summary             Patient  Acceptance, E,TB, VU by RM at 9/5/2023 1606    Acceptance, E,D, VU,NR by RG at 9/5/2023 1434    Acceptance, TB, NR by DL at 9/4/2023 2021    Acceptance, E,TB, VU by RM at 9/4/2023 1506    Acceptance, E,D, VU,NR by RG at 9/4/2023 1419    Acceptance, TB, NR by DL at 9/3/2023 2009    Acceptance, E,D, VU,NR by AG at 9/1/2023 1201    Acceptance, TB, NR by DL at 8/30/2023 2120    Acceptance, E,TB, VU by RM at 8/30/2023 1530    Acceptance, TB, NR by DL at 8/29/2023 2104    Acceptance, E,TB, VU by RM at 8/29/2023 1600    Acceptance, TB, NR by DL at 8/28/2023 2048    Acceptance, E,TB, VU,DU by HC at 8/28/2023 1451    Acceptance, D,E, VU,NR by RG at 8/28/2023 1436    Acceptance, TB, NR by DL at 8/27/2023 2309    Acceptance, E,D, VU,NR by RG at 8/26/2023 1522    Acceptance, E,D, VU,NR by LL at 8/25/2023 1525    Acceptance, E,D, VU,NR by RG at 8/25/2023 1433    Acceptance, E, VU,NR by LB at 8/24/2023 1149                         Point: Home exercise program (Done)       Learning Progress Summary             Patient Acceptance, E,TB, VU by RM at 9/5/2023 1606    Acceptance, E,D, VU,NR by RG at 9/5/2023 1434    Acceptance, TB, NR by DL at 9/4/2023 2021    Acceptance, E,TB, VU by RM at 9/4/2023 1506    Acceptance, E,D, VU,NR by RG at 9/4/2023 1419    Acceptance, TB, NR by DL at 9/3/2023 2009    Acceptance, E,D, VU,NR by AG at 9/1/2023 1201    Acceptance, TB, NR by DL at 8/30/2023 2120    Acceptance, E,TB, VU by RM at 8/30/2023 1530    Acceptance, TB, NR by DL at 8/29/2023 2104    Acceptance, E,TB, VU by RM at 8/29/2023 1600    Acceptance, TB, NR by DL at 8/28/2023 2048    Acceptance, E,TB, VU,DU by HC at 8/28/2023 1451    Acceptance, D,E, VU,NR by RG at 8/28/2023 1436    Acceptance, TB, NR by DL at 8/27/2023 2309    Acceptance, E,D, VU,NR by RG at 8/26/2023 1522    Acceptance, E,D, VU,NR by LL at 8/25/2023 1525    Acceptance, E,D, VU,NR by RG at 8/25/2023 1433    Acceptance, E, VU,NR by LB at 8/24/2023 1149                          Point: Body mechanics (Done)       Learning Progress Summary             Patient Acceptance, E,TB, VU by RM at 9/5/2023 1606    Acceptance, E,D, VU,NR by RG at 9/5/2023 1434    Acceptance, TB, NR by DL at 9/4/2023 2021    Acceptance, E,TB, VU by RM at 9/4/2023 1506    Acceptance, E,D, VU,NR by RG at 9/4/2023 1419    Acceptance, TB, NR by DL at 9/3/2023 2009    Acceptance, E,D, VU,NR by AG at 9/1/2023 1201    Acceptance, TB, NR by DL at 8/30/2023 2120    Acceptance, E,TB, VU by RM at 8/30/2023 1530    Acceptance, TB, NR by DL at 8/29/2023 2104    Acceptance, E,TB, VU by RM at 8/29/2023 1600    Acceptance, TB, NR by DL at 8/28/2023 2048    Acceptance, E,TB, VU,DU by HC at 8/28/2023 1451    Acceptance, D,E, VU,NR by RG at 8/28/2023 1436    Acceptance, TB, NR by DL at 8/27/2023 2309    Acceptance, E,D, VU,NR by RG at 8/26/2023 1522    Acceptance, E,D, VU,NR by LL at 8/25/2023 1525    Acceptance, E,D, VU,NR by RG at 8/25/2023 1433    Acceptance, E, VU,NR by LB at 8/24/2023 1149                         Point: Precautions (Done)       Learning Progress Summary             Patient Acceptance, E,TB, VU by RM at 9/5/2023 1606    Acceptance, E,D, VU,NR by RG at 9/5/2023 1434    Acceptance, TB, NR by DL at 9/4/2023 2021    Acceptance, E,TB, VU by RM at 9/4/2023 1506    Acceptance, E,D, VU,NR by RG at 9/4/2023 1419    Acceptance, TB, NR by DL at 9/3/2023 2009    Acceptance, E,D, VU,NR by AG at 9/1/2023 1201    Acceptance, TB, NR by DL at 8/30/2023 2120    Acceptance, E,TB, VU by RM at 8/30/2023 1530    Acceptance, TB, NR by DL at 8/29/2023 2104    Acceptance, E,TB, VU by RM at 8/29/2023 1600    Acceptance, TB, NR by DL at 8/28/2023 2048    Acceptance, E,TB, VU,DU by HC at 8/28/2023 1451    Acceptance, D,E, VU,NR by RG at 8/28/2023 1436    Acceptance, TB, NR by DL at 8/27/2023 2309    Acceptance, E,D, VU,NR by RG at 8/26/2023 1522    Acceptance, E,D, VU,NR by LL at 8/25/2023 1525    Acceptance, E,D, VU,NR by RG at 8/25/2023 1433     Acceptance, E, VU,NR by  at 8/24/2023 1149                                         User Key       Initials Effective Dates Name Provider Type Discipline    LB 06/16/21 -  Christel Ferguson, PT Physical Therapist PT    AG 06/16/21 -  Anu Chin, PT Physical Therapist PT    LL 05/02/16 -  Carly Lundberg, PTA Physical Therapist Assistant PT    RM 02/17/23 -  Patrizia Hope, PTA Physical Therapist Assistant PT    RG 06/16/21 -  Rajiv Pagan, PTA Physical Therapist Assistant PT    HC 01/20/23 -  Anaid Bo, PTA Physical Therapist Assistant PT    DL 03/16/22 -  Claudine Lundberg, RN Registered Nurse Nurse                    PT Recommendation and Plan    Frequency of Treatment (PT): 5 times per week  Anticipated Equipment Needs at Discharge (PT Eval): front wheeled walker, wheelchair  Daily Progress Summary (PT)  Functional Goal Overall Progress (PT): progressing toward functional goals as expected  Daily Progress Summary (PT): Improvements noted in functional mobility independence and ambulation distance this date. Assistance continaully required for safety, however. Continued skilled care required for further improvements to ensure maximum safe function upon D/C.  Impairments Still Limiting Function (PT): functional activity tolerance impairment, pain, strength deficit  Recommendations (PT): Continue per current POC               Time Calculation:      PT Charges       Row Name 09/05/23 1606 09/05/23 1434          Time Calculation    Start Time 0915  -RM 1245  -RG     Stop Time 1000  -RM 1330  -RG     Time Calculation (min) 45 min  -RM 45 min  -RG     PT Received On 09/05/23  -RM 09/05/23  -RG     PT - Next Appointment 09/05/23  - --     PT Goal Re-Cert Due Date 09/07/23  -RM --        Time Calculation- PT    Total Timed Code Minutes- PT 45 minute(s)  -RM 45 minute(s)  -RG               User Key  (r) = Recorded By, (t) = Taken By, (c) = Cosigned By      Initials Name Provider Type     Patrizia Sauceda, SHITAL Physical Therapist Assistant    Rajiv Garner, SHITAL Physical Therapist Assistant                    Therapy Charges for Today       Code Description Service Date Service Provider Modifiers Qty    13349642297 HC GAIT TRAINING EA 15 MIN 9/4/2023 Patrizia Hope, PTA GP, CQ 1    90635678063 HC PT THER PROC EA 15 MIN 9/4/2023 Patrizia Hope, PTA GP, CQ 1    70416336321 HC PT THERAPEUTIC ACT EA 15 MIN 9/4/2023 Patrizia Hope, PTA GP, CQ 1    03465172946 HC GAIT TRAINING EA 15 MIN 9/5/2023 Patrizia Hope, PTA GP, CQ 1    92001925776 HC PT THER PROC EA 15 MIN 9/5/2023 Patrizia Hope, PTA GP, CQ 1    74637923682 HC PT THERAPEUTIC ACT EA 15 MIN 9/5/2023 Patrizia Hope, PTA GP, CQ 1              PT G-Codes  AM-PAC 6 Clicks Score (PT): 17      Patrizia Hope PTA  9/5/2023

## 2023-09-05 NOTE — THERAPY TREATMENT NOTE
Inpatient Rehabilitation - Occupational Therapy Treatment Note    EDWARD Veliz     Patient Name: Reny Elena  : 1960  MRN: [id number]    Today's Date: 2023                 Admit Date: 2023       No diagnosis found.    Patient Active Problem List   Diagnosis    Tibia/fibula fracture    Iron deficiency anemia    Type 2 diabetes mellitus with hyperglycemia, with long-term current use of insulin    Wound of right ankle    Cellulitis    Metabolic encephalopathy    History of non-ST elevation myocardial infarction (NSTEMI)    HTN (hypertension)    CVA (cerebral vascular accident)    Chronic diastolic CHF (congestive heart failure)    Decubitus ulcer of coccyx, unspecified pressure ulcer stage    Depression    Expressive aphasia    Impaired mobility and ADLs    Hyperkalemia    Subdural hematoma    Severe malnutrition    Cerebrovascular accident (CVA), unspecified mechanism    PRES (posterior reversible encephalopathy syndrome)    Debility       Past Medical History:   Diagnosis Date    Arthritis     Closed fracture of right fibula and tibia 2018    Depression     Diabetic ulcer of left foot associated with type 2 diabetes mellitus 2017    Diastolic dysfunction     Disease of thyroid gland     Elevated cholesterol     End stage renal disease     Essential hypertension     GERD (gastroesophageal reflux disease)     History of transfusion     Hyperlipidemia     Iron deficiency anemia 2018    PAD (peripheral artery disease)     Renal failure     Type 2 diabetes mellitus with hyperglycemia, with long-term current use of insulin 2018       Past Surgical History:   Procedure Laterality Date    ABDOMINAL SURGERY      BACK SURGERY      Post spinal diskectomy, osteophytectomy in one lumbar interspace    CATARACT EXTRACTION      Left      SECTION      DENTAL PROCEDURE      ENDOSCOPY      FRACTURE SURGERY      right leg    HYSTERECTOMY      INCISION AND DRAINAGE LEG Left 4/15/2017     Procedure: INCISION AND DRAINAGE LOWER EXTREMITY;  Surgeon: Basilio Morris MD;  Location: [facility];  Service:     INCISION AND DRAINAGE LEG Left 5/26/2017    Procedure: Irrigation and Debridment abcess diabetic wound left foot with deep culture;  Surgeon: Basilio Morris MD;  Location: [facility];  Service:     INSERTION PERITONEAL DIALYSIS CATHETER N/A 12/16/2021    Procedure: INSERTION PERITONEAL DIALYSIS CATHETER LAPAROSCOPIC;  Surgeon: Edy Glover MD;  Location: [facility];  Service: General;  Laterality: N/A;    KNEE ARTHROSCOPY Right     ORIF TIBIA FRACTURE Right 12/28/2018    Procedure: TIBIAL  FRACTURE OPEN REDUCTION INTERNAL FIXATION;  Surgeon: Jung Barragan MD;  Location: [facility];  Service: Orthopedics    TOE AMPUTATION Right     5th    TUBAL ABDOMINAL LIGATION               IRF OT ASSESSMENT FLOWSHEET (last 12 hours)       IRF OT Evaluation and Treatment       Row Name 09/05/23 1418          OT Time and Intention    Document Type daily treatment  -KP     Mode of Treatment individual therapy;occupational therapy  -KP     Total Minutes, Occupational Therapy 90  -KP     Patient Effort good  -KP     Symptoms Noted During/After Treatment fatigue  -KP       Row Name 09/05/23 1418          General Information    Patient Profile Reviewed yes  -KP     General Observations of Patient Patient agreeable to therapy with no complaints other than fatigue.  -KP     Existing Precautions/Restrictions fall  -KP       Row Name 09/05/23 1418          Pain Assessment    Pretreatment Pain Rating 0/10 - no pain  -KP     Posttreatment Pain Rating 0/10 - no pain  -       Row Name 09/05/23 1418          Cognition/Psychosocial    Affect/Mental Status (Cognition) WFL  -     Orientation Status (Cognition) oriented x 3  -KP     Follows Commands (Cognition) WFL  -     Personal Safety Interventions gait belt;nonskid shoes/slippers when out of bed  -KP     Cognitive Function WFL  -KP       Row Name 09/05/23 1418           Upper Body Dressing    Rembert Level (Upper Body Dressing) upper body dressing skills;pull over garment;set up assistance  -     Position (Upper Body Dressing) supported sitting  -       Row Name 09/05/23 1418          Lower Body Dressing    R[place]t Level (Lower Body Dressing) minimum assist (75% patient effort);verbal cues;nonverbal cues (demo/gesture)  -[facility] Name 09/05/23 1418          Grooming    [place]t Level (Grooming) set up;grooming skills  -     Position (Grooming) supported sitting  -[facility] Name 09/05/23 1418          Toileting    [place]t Level (Toileting) toileting skills;minimum assist (75% patient effort)  -[facility] Name 09/05/23 1418          Bed-Chair Transfer    Bed-Chair Rembert (Transfers) minimum assist (75% patient effort);nonverbal cues (demo/gesture);verbal cues  -     Assistive Device (Bed-Chair Transfers) wheelchair  -[facility] Name 09/05/23 1418          Chair-Bed Transfer    Chair-Bed R[place]t (Transfers) minimum assist (75% patient effort);verbal cues  -     Assistive Device (Chair-Bed Transfers) wheelchair  -[facility] Name 09/05/23 1418          Toilet Transfer    Type (Toilet Transfer) stand pivot/stand step  -     [place]t Level (Toilet Transfer) minimum assist (75% patient effort);nonverbal cues (demo/gesture);verbal cues  -     Assistive Device (Toilet Transfer) wheelchair;grab bars/safety frame  -[facility] Name 09/05/23 1418          Motor Skills    Functional Endurance fair  -     Motor Control/Coordination Interventions fine motor manipulation/dexterity activities;gross motor coordination activities;therapeutic exercise/ROM  C/[facility] functional reach on tabletop; bilateral kim; hand strengthening  -[facility] Name 09/05/23 1418          Positioning and Restraints    Pre-Treatment Position in bed  -     In Bed fowlers;call light within reach  -[facility] Name 09/05/23 1418          Daily Progress  Summary (OT)    Overall Progress Toward Functional Goals (OT) progressing toward functional goals as expected  -               User Key  (r) = Recorded By, (t) = Taken By, (c) = Cosigned By      Initials Name Effective Dates    KRISTINA Tammie Dugan, OT 06/16/21 -                      Occupational Therapy Education       Title: PT OT SLP Therapies (In Progress)       Topic: Occupational Therapy (In Progress)       Point: ADL training (In Progress)       Description:   Instruct learner(s) on proper safety adaptation and remediation techniques during self care or transfers.   Instruct in proper use of assistive devices.                  Learning Progress Summary             Patient Acceptance, TB, NR by DL at 9/4/2023 2021    Acceptance, E, VU,NR by BF at 9/4/2023 1343    Acceptance, TB, NR by DL at 9/3/2023 2009    Acceptance, E,D, VU,NR by TM at 9/1/2023 1251    Acceptance, E, VU,NR by BF at 8/31/2023 1430    Acceptance, E, NR by BF at 8/30/2023 1235    Acceptance, TB, NR by DL at 8/29/2023 2104    Acceptance, E, VU,NR by BF at 8/29/2023 1447    Acceptance, TB, NR by DL at 8/28/2023 2048    Acceptance, E,D, NR,VU by CJ at 8/28/2023 1505    Acceptance, TB, NR by DL at 8/27/2023 2309    Acceptance, E, VU,NR by HB at 8/26/2023 1417    Acceptance, E, VU,NR by BF at 8/25/2023 1438    Acceptance, E, VU,NR by BF at 8/24/2023 1450                         Point: Precautions (In Progress)       Description:   Instruct learner(s) on prescribed precautions during self-care and functional transfers.                  Learning Progress Summary             Patient Acceptance, TB, NR by DL at 9/4/2023 2021    Acceptance, E, VU,NR by BF at 9/4/2023 1343    Acceptance, TB, NR by DL at 9/3/2023 2009    Acceptance, E,D, VU,NR by TM at 9/1/2023 1251    Acceptance, E, VU,NR by BF at 8/31/2023 1430    Acceptance, E, NR by BF at 8/30/2023 1235    Acceptance, TB, NR by DL at 8/29/2023 2104    Acceptance, E, VU,NR by BF at 8/29/2023 1447     Acceptance, TB, NR by DL at 8/28/2023 2048    Acceptance, E,D, NR,VU by CJ at 8/28/2023 1505    Acceptance, TB, NR by DL at 8/27/2023 2309    Acceptance, E, VU,NR by HB at 8/26/2023 1417    Acceptance, E, VU,NR by BF at 8/25/2023 1438    Acceptance, E, VU,NR by BF at 8/24/2023 1450                                         User Key       Initials Effective Dates Name Provider Type Discipline    BF 07/11/23 -  Yael Mendez, OT Occupational Therapist OT    CJ 06/16/21 -  Nichelle Lagunsa OT Occupational Therapist OT    TM 06/16/21 -  Olivia Rahman, OT Occupational Therapist OT    HB 05/25/21 -  Ella Wallace, OT Occupational Therapist OT    DL 03/16/22 -  Claudine Lundberg RN Registered Nurse Nurse                        OT Recommendation and Plan            Daily Progress Summary (OT)  Overall Progress Toward Functional Goals (OT): progressing toward functional goals as expected            Time Calculation:      Time Calculation- OT       Row Name 09/05/23 1422 09/05/23 1421          Time Calculation- OT    OT Start Time 1330  - 0830  -     OT Stop Time 1415  - 0915  -     OT Time Calculation (min) 45 min  - 45 min  -     Total Timed Code Minutes- OT 45 minute(s)  -KP 45 minute(s)  -     OT Received On 09/05/23  -KP 09/05/23  -               User Key  (r) = Recorded By, (t) = Taken By, (c) = Cosigned By      Initials Name Provider Type     Tammie Dugan OT Occupational Therapist                  Therapy Charges for Today       Code Description Service Date Service Provider Modifiers Qty    50663078618 HC OT SELF CARE/MGMT/TRAIN EA 15 MIN 9/5/2023 Tammie Dugan OT GO 2    27650421223 HC OT THERAPEUTIC ACT EA 15 MIN 9/5/2023 Tammie Dugan OT GO 2    85944394308 HC OT THER PROC EA 15 MIN 9/5/2023 Tammie Dugan OT GO 2                     Tammie Dugan OT  9/5/2023

## 2023-09-05 NOTE — PROGRESS NOTES
Nephrology Progress Note      Subjective   Shortness of breath is significantly better, no chest pain    Objective       Vital signs :     Temp:  [98.1 °F (36.7 °C)-99 °F (37.2 °C)] 98.1 °F (36.7 °C)  Heart Rate:  [70-77] 77  Resp:  [14-18] 14  BP: (115-190)/(62-90) 190/88    Intake/Output                               09/03/23 0701 - 09/04/23 0700 09/04/23 0701 - 09/05/23 0700 09/05/23 0701 - 09/06/23 0700     [id number] 1553-8519 Total 0289-0906 7570-3938 Total 8369-3823 8124-1972 Total                    Intake    P.O.  666  420 1086  660  120 780  --  -- --    Total Intake  660 120 780 -- -- --       Output    Dialysis  --  -- --  1315  -- 1315  791  -- 791    Total Output -- -- -- 1315 -- 1315 791 -- 791             Physical Exam:  General Appearance : alert, pleasant, appears stated age, cooperative and alert  Lungs : Decreased intensity of breath sounds  Heart :  regular rhythm & normal rate, normal S1, S2 and no murmur, no rub  Abdomen : soft, non distended  Extremities : 1+ edema  Neurologic :   orientated to person, place, time and situation, Grossly no focal deficits        Laboratory Data :     Albumin No results found for: ALBUMIN         Magnesium No results found for: MG           PTH               No results found for: PTH    CBC and coagulation:  Results from last 7 days   Lab Units 09/02/23  0100 08/31/23  1618 08/31/23  0239   WBC 10*3/mm3 4.65  --  4.32   HEMOGLOBIN g/dL 8.0*  --  7.6*   HEMATOCRIT % 26.3*  --  26.8*   MCV fL 97.4*  --  105.1*   MCHC g/dL 30.4*  --  28.4*   PLATELETS 10*3/mm3 147 165 100*     Acid/base balance:      Renal and electrolytes:    Results from last 7 days   Lab Units 09/02/23 0100 08/31/23  0239   SODIUM mmol/L 131* 129*   POTASSIUM mmol/L 4.8 4.9   CHLORIDE mmol/L 95* 96*   CO2 mmol/L 24.0 22.4   BUN mg/dL 109* 105*   CREATININE mg/dL 5.17* 5.12*   CALCIUM mg/dL 8.0* 7.7*     Estimated Creatinine Clearance: 9.6 mL/min (A) (by C-G formula based on SCr of  5.17 mg/dL (H)).  @GFRCG:3@   Liver and pancreatic function:        Invalid input(s): PROT        Cardiac:      Liver and pancreatic function:        Invalid input(s): PROT      Medications :     amLODIPine, 5 mg, Oral, BID  aspirin, 81 mg, Oral, Daily  atorvastatin, 80 mg, Oral, Nightly  castor oil-balsam peru, 1 application , Topical, Q12H  epoetin earnest/earnest-epbx, 10,000 Units, Subcutaneous, Once per day on Mon Wed Fri  FLUoxetine, 40 mg, Oral, Daily  fluticasone, 2 spray, Each Nare, Daily  gentamicin, 1 application , Topical, Q24H  heparin (porcine), 5,000 Units, Subcutaneous, Q12H  insulin glargine, 20 Units, Subcutaneous, Daily  insulin lispro, 3-14 Units, Subcutaneous, TID With Meals  isosorbide mononitrate, 30 mg, Oral, Daily  levothyroxine, 50 mcg, Oral, Daily  lisinopril, 20 mg, Oral, Nightly  metoprolol succinate XL, 25 mg, Oral, Daily  multivitamin, 1 tablet, Oral, Daily  pantoprazole, 40 mg, Oral, Daily  rOPINIRole, 0.5 mg, Oral, BID  traZODone, 50 mg, Oral, Nightly             Assessment & Plan     1.  End-stage renal disease on peritoneal dialysis  2.  Type 2 diabetes with nephropathy  3.  Chronic diarrhea now worse  4.  New onset hypothyroidism  5.  Hyponatremia, multifactorial including hypovolemic  6.  Hypertension  7.  Malnutrition  8.  Diabetic neuropathy and myopathy with inability to ambulate independently  9.  Anemia of CKD    Dialysis today with 1.5% and 2.5% mix today  Mild hyponatremia, educated and counseled on fluid restriction    Plan of care was discussed with the patient, understood verbalized agreed        Nicholas Arroyo MD  09/05/23  07:59 EDT

## 2023-09-05 NOTE — PROGRESS NOTES
Occupational Therapy: Individual: 90 minutes.    Physical Therapy:    Speech Language Pathology:    Signed by: Tammie Dugan OT

## 2023-09-05 NOTE — THERAPY TREATMENT NOTE
Inpatient Rehabilitation - Physical Therapy Treatment Note       EDWARD Veliz     Patient Name: Reny Elena  : 1960  MRN: [id number]    Today's Date: 2023                    Admit Date: 2023      Visit Dx:   No diagnosis found.    Patient Active Problem List   Diagnosis    Tibia/fibula fracture    Iron deficiency anemia    Type 2 diabetes mellitus with hyperglycemia, with long-term current use of insulin    Wound of right ankle    Cellulitis    Metabolic encephalopathy    History of non-ST elevation myocardial infarction (NSTEMI)    HTN (hypertension)    CVA (cerebral vascular accident)    Chronic diastolic CHF (congestive heart failure)    Decubitus ulcer of coccyx, unspecified pressure ulcer stage    Depression    Expressive aphasia    Impaired mobility and ADLs    Hyperkalemia    Subdural hematoma    Severe malnutrition    Cerebrovascular accident (CVA), unspecified mechanism    PRES (posterior reversible encephalopathy syndrome)    Debility       Past Medical History:   Diagnosis Date    Arthritis     Closed fracture of right fibula and tibia 2018    Depression     Diabetic ulcer of left foot associated with type 2 diabetes mellitus 2017    Diastolic dysfunction     Disease of thyroid gland     Elevated cholesterol     End stage renal disease     Essential hypertension     GERD (gastroesophageal reflux disease)     History of transfusion     Hyperlipidemia     Iron deficiency anemia 2018    PAD (peripheral artery disease)     Renal failure     Type 2 diabetes mellitus with hyperglycemia, with long-term current use of insulin 2018       Past Surgical History:   Procedure Laterality Date    ABDOMINAL SURGERY      BACK SURGERY      Post spinal diskectomy, osteophytectomy in one lumbar interspace    CATARACT EXTRACTION      Left      SECTION      DENTAL PROCEDURE      ENDOSCOPY      FRACTURE SURGERY      right leg    HYSTERECTOMY      INCISION AND DRAINAGE LEG Left  4/15/2017    Procedure: INCISION AND DRAINAGE LOWER EXTREMITY;  Surgeon: Basilio Morris MD;  Location: [facility] OR;  Service:     INCISION AND DRAINAGE LEG Left 5/26/2017    Procedure: Irrigation and Debridment abcess diabetic wound left foot with deep culture;  Surgeon: Basilio Morris MD;  Location: [facility] OR;  Service:     INSERTION PERITONEAL DIALYSIS CATHETER N/A 12/16/2021    Procedure: INSERTION PERITONEAL DIALYSIS CATHETER LAPAROSCOPIC;  Surgeon: Edy Glover MD;  Location: [facility] OR;  Service: General;  Laterality: N/A;    KNEE ARTHROSCOPY Right     ORIF TIBIA FRACTURE Right 12/28/2018    Procedure: TIBIAL  FRACTURE OPEN REDUCTION INTERNAL FIXATION;  Surgeon: Jung Barragan MD;  Location: [facility] OR;  Service: Orthopedics    TOE AMPUTATION Right     5th    TUBAL ABDOMINAL LIGATION         PT ASSESSMENT (last 12 hours)       IRF PT Evaluation and Treatment       Row Name 09/05/23 1424          PT Time and Intention    Document Type daily treatment  -RG     Mode of Treatment physical therapy  -RG     Patient/Family/Caregiver Comments/Observations Pt and nursing in agreement for skilled PT on this date.  -RG       Row Name 09/05/23 1424          General Information    Patient Profile Reviewed yes  -RG     Existing Precautions/Restrictions fall  hx L patellar subluxation; peritoneal dialysis port R abdomen  -RG       Row Name 09/05/23 1424          Cognition/Psychosocial    Affect/Mental Status (Cognition) WFL  -RG     Orientation Status (Cognition) oriented x 3  -RG     Follows Commands (Cognition) verbal cues/prompting required  -RG     Personal Safety Interventions gait belt;fall prevention program maintained;nonskid shoes/slippers when out of bed  -RG     Cognitive Function WFL  -RG       Row Name 09/05/23 1424          Transfer Assessment/Treatment    Transfers toilet transfer;stand pivot/stand step transfer;sit-stand transfer;stand-sit transfer  -RG       Row Name 09/05/23 1424          Bed-Chair  Transfer    Bed-Chair [place] (Transfers) minimum assist (75% patient effort);nonverbal cues (demo/gesture);verbal cues  -RG     Assistive Device (Bed-Chair Transfers) wheelchair  -RG       Row Name 09/05/23 1424          Sit-Stand Transfer    Sit-Stand [place] (Transfers) verbal cues;nonverbal cues (demo/gesture);minimum assist (75% patient effort)  -RG     Assistive Device (Sit-Stand Transfers) walker, front-wheeled;wheelchair  -RG       Row Name 09/05/23 1424          Stand-Sit Transfer    Stand-Sit [place] (Transfers) verbal cues;nonverbal cues (demo/gesture);minimum assist (75% patient effort)  -RG     Assistive Device (Stand-Sit Transfers) walker, front-wheeled;wheelchair  -RG       Row Name 09/05/23 1424          Stand Pivot/Stand Step Transfer    Stand Pivot/Stand Step [place] (Transfers) verbal cues;nonverbal cues (demo/gesture);minimum assist (75% patient effort)  -RG     Assistive Device (Stand Pivot Stand Step Transfer) walker, front-wheeled  -RG       Row Name 09/05/23 1424          Gait/Stairs (Locomotion)    Gait/Stairs Locomotion gait/ambulation independence;gait/ambulation assistive device;distance ambulated;gait pattern;gait deviations  -RG     [place] Level (Gait) verbal cues;nonverbal cues (demo/gesture);minimum assist (75% patient effort)  -RG     Assistive Device (Gait) walker, front-wheeled  -RG     Distance in Feet (Gait) 80'  -RG     Pattern (Gait) step-through  cues for incr stride length and to keep SHELIA inside walker  -RG     Deviations/Abnormal Patterns (Gait) stride length decreased;gait speed decreased;casa decreased  -RG     Bilateral Gait Deviations forward flexed posture;heel strike decreased  -RG     Comment, (Gait/Stairs) Pt c/o fatigue by 2nd session of PT.  She demonstrated decreased step length and shuffling gait pattern.  Cues given for body mechanics and to increase step length.  -RG       Row Name 09/05/23 1424          Safety Issues, Functional  Mobility    Impairments Affecting Function (Mobility) balance;endurance/activity tolerance;strength;pain  -       Row Name 09/05/23 1424          Hip (Therapeutic Exercise)    Hip Strengthening (Therapeutic Exercise) bilateral;flexion;aBduction;aDduction;marching while seated;marching while standing;standing;10 repetitions;2 sets;sitting  -       Row Name 09/05/23 1424          Knee (Therapeutic Exercise)    Knee Strengthening (Therapeutic Exercise) bilateral;flexion;extension;marching while seated;marching while standing;LAQ (long arc quad);hamstring curls;sitting;standing;10 repetitions;2 sets  -       Row Name 09/05/23 1424          Positioning and Restraints    Pre-Treatment Position in bed  -RG     Post Treatment Position wheelchair  -RG     In Wheelchair sitting;notified nsg;with OT;legs elevated  -       Row Name 09/05/23 1424          Therapy Assessment/Plan (PT)    Patient's Goals For Discharge return home  -       Row Name 09/05/23 1424          Daily Progress Summary (PT)    Impairments Still Limiting Function (PT) functional activity tolerance impairment;pain;strength deficit  -       Row Name 09/05/23 1424          Therapy Plan Review/Discharge Plan (PT)    Anticipated Equipment Needs at Discharge (PT Eval) front wheeled walker;wheelchair  -RG     Anticipated Discharge Disposition (PT) home with assist;home with home health  -               User Key  (r) = Recorded By, (t) = Taken By, (c) = Cosigned By      Initials Name Provider Type    RG Rajiv Pagan PTA Physical Therapist Assistant                  Wound 08/23/23 1923 Right posterior heel (Active)   Dressing Appearance open to air 09/05/23 0921   Closure None 09/05/23 0921   Base non-blanchable;maroon/purple 09/05/23 0921   Drainage Amount none 09/05/23 0921   Care, Wound barrier applied 09/05/23 0921   Dressing Care open to air 09/05/23 0921       Wound 08/23/23 2313 Left gluteal (Active)   Dressing Appearance open to air 09/05/23  0921   Closure None 09/05/23 0921   Base non-blanchable 09/05/23 0921   Periwound dry;non-blanchable 09/05/23 0921   Drainage Amount none 09/05/23 0921   Care, Wound barrier applied 09/05/23 0921   Dressing Care open to air 09/05/23 0921     Physical Therapy Education       Title: PT OT SLP Therapies (In Progress)       Topic: Physical Therapy (Done)       Point: Mobility training (Done)       Learning Progress Summary             Patient Acceptance, E,D, VU,NR by RG at 9/5/2023 1434    Acceptance, TB, NR by DL at 9/4/2023 2021    Acceptance, E,TB, VU by RM at 9/4/2023 1506    Acceptance, E,D, VU,NR by RG at 9/4/2023 1419    Acceptance, TB, NR by DL at 9/3/2023 2009    Acceptance, E,D, VU,NR by AG at 9/1/2023 1201    Acceptance, TB, NR by DL at 8/30/2023 2120    Acceptance, E,TB, VU by RM at 8/30/2023 1530    Acceptance, TB, NR by DL at 8/29/2023 2104    Acceptance, E,TB, VU by RM at 8/29/2023 1600    Acceptance, TB, NR by DL at 8/28/2023 2048    Acceptance, E,TB, VU,DU by HC at 8/28/2023 1451    Acceptance, D,E, VU,NR by RG at 8/28/2023 1436    Acceptance, TB, NR by DL at 8/27/2023 2309    Acceptance, E,D, VU,NR by RG at 8/26/2023 1522    Acceptance, E,D, VU,NR by LL at 8/25/2023 1525    Acceptance, E,D, VU,NR by RG at 8/25/2023 1433    Acceptance, E, VU,NR by LB at 8/24/2023 1149                         Point: Home exercise program (Done)       Learning Progress Summary             Patient Acceptance, E,D, VU,NR by RG at 9/5/2023 1434    Acceptance, TB, NR by DL at 9/4/2023 2021    Acceptance, E,TB, VU by RM at 9/4/2023 1506    Acceptance, E,D, VU,NR by RG at 9/4/2023 1419    Acceptance, TB, NR by DL at 9/3/2023 2009    Acceptance, E,D, VU,NR by AG at 9/1/2023 1201    Acceptance, TB, NR by DL at 8/30/2023 2120    Acceptance, E,TB, VU by RM at 8/30/2023 1530    Acceptance, TB, NR by DL at 8/29/2023 2104    Acceptance, E,TB, VU by RM at 8/29/2023 1600    Acceptance, TB, NR by DL at 8/28/2023 2048    Acceptance,  E,TB, VU,DU by HC at 8/28/2023 1451    Acceptance, D,E, VU,NR by RG at 8/28/2023 1436    Acceptance, TB, NR by DL at 8/27/2023 2309    Acceptance, E,D, VU,NR by RG at 8/26/2023 1522    Acceptance, E,D, VU,NR by LL at 8/25/2023 1525    Acceptance, E,D, VU,NR by RG at 8/25/2023 1433    Acceptance, E, VU,NR by LB at 8/24/2023 1149                         Point: Body mechanics (Done)       Learning Progress Summary             Patient Acceptance, E,D, VU,NR by RG at 9/5/2023 1434    Acceptance, TB, NR by DL at 9/4/2023 2021    Acceptance, E,TB, VU by RM at 9/4/2023 1506    Acceptance, E,D, VU,NR by RG at 9/4/2023 1419    Acceptance, TB, NR by DL at 9/3/2023 2009    Acceptance, E,D, VU,NR by AG at 9/1/2023 1201    Acceptance, TB, NR by DL at 8/30/2023 2120    Acceptance, E,TB, VU by RM at 8/30/2023 1530    Acceptance, TB, NR by DL at 8/29/2023 2104    Acceptance, E,TB, VU by RM at 8/29/2023 1600    Acceptance, TB, NR by DL at 8/28/2023 2048    Acceptance, E,TB, VU,DU by HC at 8/28/2023 1451    Acceptance, D,E, VU,NR by RG at 8/28/2023 1436    Acceptance, TB, NR by DL at 8/27/2023 2309    Acceptance, E,D, VU,NR by RG at 8/26/2023 1522    Acceptance, E,D, VU,NR by LL at 8/25/2023 1525    Acceptance, E,D, VU,NR by RG at 8/25/2023 1433    Acceptance, E, VU,NR by LB at 8/24/2023 1149                         Point: Precautions (Done)       Learning Progress Summary             Patient Acceptance, E,D, VU,NR by RG at 9/5/2023 1434    Acceptance, TB, NR by DL at 9/4/2023 2021    Acceptance, E,TB, VU by RM at 9/4/2023 1506    Acceptance, E,D, VU,NR by RG at 9/4/2023 1419    Acceptance, TB, NR by DL at 9/3/2023 2009    Acceptance, E,D, VU,NR by AG at 9/1/2023 1201    Acceptance, TB, NR by DL at 8/30/2023 2120    Acceptance, E,TB, VU by RM at 8/30/2023 1530    Acceptance, TB, NR by DL at 8/29/2023 2104    Acceptance, E,TB, VU by RM at 8/29/2023 1600    Acceptance, TB, NR by DL at 8/28/2023 2048    Acceptance, E,TB, VU,DU by HC at  8/28/2023 1451    Acceptance, D,E, VU,NR by RG at 8/28/2023 1436    Acceptance, TB, NR by DL at 8/27/2023 2309    Acceptance, E,D, VU,NR by RG at 8/26/2023 1522    Acceptance, E,D, VU,NR by LL at 8/25/2023 1525    Acceptance, E,D, VU,NR by RG at 8/25/2023 1433    Acceptance, E, VU,NR by LB at 8/24/2023 1149                                         User Key       Initials Effective Dates Name Provider Type Discipline    LB 06/16/21 -  Christel Ferguson, PT Physical Therapist PT    AG 06/16/21 -  Anu Chin, PT Physical Therapist PT    LL 05/02/16 -  Carly Lundberg, PTA Physical Therapist Assistant PT    RM 02/17/23 -  Patrizia Hpoe, PTA Physical Therapist Assistant PT    RG 06/16/21 -  Rajiv Pagan, SHITAL Physical Therapist Assistant PT    HC 01/20/23 -  Anaid Bo, PTA Physical Therapist Assistant PT    DL 03/16/22 -  Claudine Lundberg, RN Registered Nurse Nurse                    PT Recommendation and Plan    Frequency of Treatment (PT): 5 times per week  Anticipated Equipment Needs at Discharge (PT Eval): front wheeled walker, wheelchair  Daily Progress Summary (PT)  Impairments Still Limiting Function (PT): functional activity tolerance impairment, pain, strength deficit               Time Calculation:      PT Charges       Row Name 09/05/23 1434             Time Calculation    Start Time 1245  -RG      Stop Time 1330  -RG      Time Calculation (min) 45 min  -RG      PT Received On 09/05/23  -RG         Time Calculation- PT    Total Timed Code Minutes- PT 45 minute(s)  -RG                User Key  (r) = Recorded By, (t) = Taken By, (c) = Cosigned By      Initials Name Provider Type    RG Rajiv Pagan PTA Physical Therapist Assistant                    Therapy Charges for Today       Code Description Service Date Service Provider Modifiers Qty    60132693782  PT THERAPEUTIC ACT EA 15 MIN 9/4/2023 Rajiv Pagan PTA GP, CQ 1    28028555321 HC PT THER PROC EA 15 MIN 9/4/2023 Ej  SHITAL Vance GP, CQ 2    69243642782 HC GAIT TRAINING EA 15 MIN 9/5/2023 Rajiv Pagan PTA GP, CQ 1    39122761785 HC PT THERAPEUTIC ACT EA 15 MIN 9/5/2023 Rajiv Pagan PTA GP, CQ 1    17639626807 HC PT THER PROC EA 15 MIN 9/5/2023 Rajiv Pagan PTA GP, CQ 1              PT G-Codes  AM-PAC 6 Clicks Score (PT): 17      aRjiv Pagan PTA  9/5/2023

## 2023-09-06 LAB
GLUCOSE BLDC GLUCOMTR-MCNC: 138 MG/DL (ref 70–130)
GLUCOSE BLDC GLUCOMTR-MCNC: 147 MG/DL (ref 70–130)
GLUCOSE BLDC GLUCOMTR-MCNC: 209 MG/DL (ref 70–130)
GLUCOSE BLDC GLUCOMTR-MCNC: 238 MG/DL (ref 70–130)

## 2023-09-06 PROCEDURE — 25010000002 HEPARIN (PORCINE) PER 1000 UNITS: Performed by: INTERNAL MEDICINE

## 2023-09-06 PROCEDURE — 97530 THERAPEUTIC ACTIVITIES: CPT

## 2023-09-06 PROCEDURE — 97530 THERAPEUTIC ACTIVITIES: CPT | Performed by: OCCUPATIONAL THERAPIST

## 2023-09-06 PROCEDURE — 97116 GAIT TRAINING THERAPY: CPT

## 2023-09-06 PROCEDURE — 97110 THERAPEUTIC EXERCISES: CPT

## 2023-09-06 PROCEDURE — 63710000001 INSULIN LISPRO (HUMAN) PER 5 UNITS: Performed by: INTERNAL MEDICINE

## 2023-09-06 PROCEDURE — 82948 REAGENT STRIP/BLOOD GLUCOSE: CPT

## 2023-09-06 PROCEDURE — 25010000002 EPOETIN ALFA PER 1000 UNITS: Performed by: INTERNAL MEDICINE

## 2023-09-06 PROCEDURE — 97110 THERAPEUTIC EXERCISES: CPT | Performed by: OCCUPATIONAL THERAPIST

## 2023-09-06 PROCEDURE — 63710000001 INSULIN GLARGINE PER 5 UNITS: Performed by: FAMILY MEDICINE

## 2023-09-06 PROCEDURE — 97535 SELF CARE MNGMENT TRAINING: CPT | Performed by: OCCUPATIONAL THERAPIST

## 2023-09-06 RX ADMIN — ISOSORBIDE MONONITRATE 30 MG: 30 TABLET, EXTENDED RELEASE ORAL at 08:36

## 2023-09-06 RX ADMIN — ROPINIROLE HYDROCHLORIDE 0.5 MG: 0.25 TABLET, FILM COATED ORAL at 08:36

## 2023-09-06 RX ADMIN — AMLODIPINE BESYLATE 5 MG: 5 TABLET ORAL at 21:19

## 2023-09-06 RX ADMIN — CASTOR OIL AND BALSAM, PERU 1 APPLICATION: 788; 87 OINTMENT TOPICAL at 08:38

## 2023-09-06 RX ADMIN — HEPARIN SODIUM 5000 UNITS: 5000 INJECTION INTRAVENOUS; SUBCUTANEOUS at 21:20

## 2023-09-06 RX ADMIN — INSULIN LISPRO 5 UNITS: 100 INJECTION, SOLUTION INTRAVENOUS; SUBCUTANEOUS at 11:50

## 2023-09-06 RX ADMIN — ERYTHROPOIETIN 10000 UNITS: 20000 INJECTION, SOLUTION INTRAVENOUS; SUBCUTANEOUS at 17:19

## 2023-09-06 RX ADMIN — Medication 1 TABLET: at 08:36

## 2023-09-06 RX ADMIN — HEPARIN SODIUM 5000 UNITS: 5000 INJECTION INTRAVENOUS; SUBCUTANEOUS at 08:36

## 2023-09-06 RX ADMIN — INSULIN GLARGINE 25 UNITS: 100 INJECTION, SOLUTION SUBCUTANEOUS at 08:36

## 2023-09-06 RX ADMIN — FLUTICASONE PROPIONATE 2 SPRAY: 50 SPRAY, METERED NASAL at 08:36

## 2023-09-06 RX ADMIN — AMLODIPINE BESYLATE 5 MG: 5 TABLET ORAL at 08:36

## 2023-09-06 RX ADMIN — TRAZODONE HYDROCHLORIDE 50 MG: 50 TABLET ORAL at 21:19

## 2023-09-06 RX ADMIN — HYDROCODONE BITARTRATE AND ACETAMINOPHEN 1 TABLET: 10; 325 TABLET ORAL at 21:25

## 2023-09-06 RX ADMIN — ATORVASTATIN CALCIUM 80 MG: 40 TABLET, FILM COATED ORAL at 21:19

## 2023-09-06 RX ADMIN — FLUOXETINE HYDROCHLORIDE 40 MG: 20 CAPSULE ORAL at 08:36

## 2023-09-06 RX ADMIN — HYDROCODONE BITARTRATE AND ACETAMINOPHEN 1 TABLET: 10; 325 TABLET ORAL at 08:36

## 2023-09-06 RX ADMIN — CASTOR OIL AND BALSAM, PERU 1 APPLICATION: 788; 87 OINTMENT TOPICAL at 21:20

## 2023-09-06 RX ADMIN — ASPIRIN 81 MG: 81 TABLET, COATED ORAL at 08:36

## 2023-09-06 RX ADMIN — METOPROLOL SUCCINATE 25 MG: 25 TABLET, EXTENDED RELEASE ORAL at 08:36

## 2023-09-06 RX ADMIN — LISINOPRIL 20 MG: 10 TABLET ORAL at 21:19

## 2023-09-06 RX ADMIN — ROPINIROLE HYDROCHLORIDE 0.5 MG: 0.25 TABLET, FILM COATED ORAL at 21:19

## 2023-09-06 RX ADMIN — PANTOPRAZOLE SODIUM 40 MG: 40 TABLET, DELAYED RELEASE ORAL at 08:36

## 2023-09-06 RX ADMIN — LEVOTHYROXINE SODIUM 50 MCG: 50 TABLET ORAL at 08:36

## 2023-09-06 NOTE — PLAN OF CARE
Goal Outcome Evaluation:  Plan of Care Reviewed With: patient        Progress: no change  Outcome Evaluation: CONTINUE PLAN OF CARE; MONITOR

## 2023-09-06 NOTE — SIGNIFICANT NOTE
09/06/23 1315   Plan   Plan SS spoke to significant other Cliff [phone] about plans for pt to be discharged home on 9-7-23, recommendation for home health PT/OT and RW.  Spouse says he is able to resume caregiving and providing peritoneal dialysis for pt.  Informed him pt requests [facility] for transport home.  Significant other will wait for pt at home.  Contacted [facility] [phone] per preference per Kristin about discharge on 9-7-23, referral and need for HH PT 2-3 x wk x 8 wks for strengthening, endurance, gait training, transfer training, balance, therapeutic exercise, bed mobility, home safety, OT 2-3 x wk x 8 wks for ADL re-training, functional mobility, strengthening, endurance.  Pt was receiving Waiver aide services which will be resumed.  Faxed face sheet, H&P, PT/OT notes/evaluations, and MD progress notes to  via epic.  HH to be contacted at discharge.  Ambulatory referral for home health with face to face and discharge summary to be faxed to  at discharge.  Contacted [facility] [phone] per preference per Patrizia with discharge on 9-7-23 and need for rolling walker.  Faxed face sheet, H&P, PT note and MD progress note to [facility] via [facility].  RW to be delivered to rehab on 9-7-23.   Patient/Family in Agreement with Plan yes

## 2023-09-06 NOTE — SIGNIFICANT NOTE
09/05/23 2862   Plan   Plan Team conference held today.  Spoke to pt about how she is doing in therapy, plans for discharge on 9-7-23, recommendation for home health PT/OT and rolling walker.  Pt prefers [facility]; they provided Waiver aide 2 x wk x 2 hours and SW once per month prior to admission.  [facility] is preferred for RW.  Pt attempted to contact significant other Xwtr [phone] without success.  Pt plans to return home with significant other assisting with caregiving needs and peritoneal dialysis.   Patient/Family in Agreement with Plan yes

## 2023-09-06 NOTE — THERAPY TREATMENT NOTE
Inpatient Rehabilitation - Physical Therapy Treatment Note       EDWARD Veliz     Patient Name: Reny Elena  : 1960  MRN: [id number]    Today's Date: 2023                    Admit Date: 2023      Visit Dx:   No diagnosis found.    Patient Active Problem List   Diagnosis    Tibia/fibula fracture    Iron deficiency anemia    Type 2 diabetes mellitus with hyperglycemia, with long-term current use of insulin    Wound of right ankle    Cellulitis    Metabolic encephalopathy    History of non-ST elevation myocardial infarction (NSTEMI)    HTN (hypertension)    CVA (cerebral vascular accident)    Chronic diastolic CHF (congestive heart failure)    Decubitus ulcer of coccyx, unspecified pressure ulcer stage    Depression    Expressive aphasia    Impaired mobility and ADLs    Hyperkalemia    Subdural hematoma    Severe malnutrition    Cerebrovascular accident (CVA), unspecified mechanism    PRES (posterior reversible encephalopathy syndrome)    Debility       Past Medical History:   Diagnosis Date    Arthritis     Closed fracture of right fibula and tibia 2018    Depression     Diabetic ulcer of left foot associated with type 2 diabetes mellitus 2017    Diastolic dysfunction     Disease of thyroid gland     Elevated cholesterol     End stage renal disease     Essential hypertension     GERD (gastroesophageal reflux disease)     History of transfusion     Hyperlipidemia     Iron deficiency anemia 2018    PAD (peripheral artery disease)     Renal failure     Type 2 diabetes mellitus with hyperglycemia, with long-term current use of insulin 2018       Past Surgical History:   Procedure Laterality Date    ABDOMINAL SURGERY      BACK SURGERY      Post spinal diskectomy, osteophytectomy in one lumbar interspace    CATARACT EXTRACTION      Left      SECTION      DENTAL PROCEDURE      ENDOSCOPY      FRACTURE SURGERY      right leg    HYSTERECTOMY      INCISION AND DRAINAGE LEG Left  4/15/2017    Procedure: INCISION AND DRAINAGE LOWER EXTREMITY;  Surgeon: Basilio Morris MD;  Location: [facility] OR;  Service:     INCISION AND DRAINAGE LEG Left 5/26/2017    Procedure: Irrigation and Debridment abcess diabetic wound left foot with deep culture;  Surgeon: Basilio Morris MD;  Location: [facility] OR;  Service:     INSERTION PERITONEAL DIALYSIS CATHETER N/A 12/16/2021    Procedure: INSERTION PERITONEAL DIALYSIS CATHETER LAPAROSCOPIC;  Surgeon: Edy Glover MD;  Location: [facility] OR;  Service: General;  Laterality: N/A;    KNEE ARTHROSCOPY Right     ORIF TIBIA FRACTURE Right 12/28/2018    Procedure: TIBIAL  FRACTURE OPEN REDUCTION INTERNAL FIXATION;  Surgeon: Jung Barragan MD;  Location: [facility] OR;  Service: Orthopedics    TOE AMPUTATION Right     5th    TUBAL ABDOMINAL LIGATION         PT ASSESSMENT (last 12 hours)       IRF PT Evaluation and Treatment       Row Name 09/06/23 1551          PT Time and Intention    Document Type daily treatment  -RM     Mode of Treatment physical therapy  -RM     Patient/Family/Caregiver Comments/Observations Pt reports B knee pain this date.  -RM       Row Name 09/06/23 1551          General Information    Patient Profile Reviewed yes  -RM     Existing Precautions/Restrictions fall  hx L patellar subluxation; peritoneal dialysis port R abdomen  -RM       Row Name 09/06/23 1551          Cognition/Psychosocial    Affect/Mental Status (Cognition) WFL  -RM     Orientation Status (Cognition) oriented x 3  -RM     Follows Commands (Cognition) verbal cues/prompting required  -RM     Personal Safety Interventions gait belt;nonskid shoes/slippers when out of bed;fall prevention program maintained  -RM     Cognitive Function WFL  -RM       Row Name 09/06/23 1551          Bed Mobility    Supine-Sit [place] (Bed Mobility) standby assist;supervision  -RM     Sit-Supine [place] (Bed Mobility) standby assist;supervision  -RM     Assistive Device (Bed Mobility) bed rails   -       Row Name 09/06/23 1551          Transfer Assessment/Treatment    Transfers toilet transfer;stand pivot/stand step transfer;sit-stand transfer;stand-sit transfer;car transfer  -RM     Comment, (Transfers) Cues for proper technique with RW and safety required for trasnfer training this date. Pt performed varying transfers with RW.  -       Row Name 09/06/23 1551          Bed-Chair Transfer    Bed-Chair [place] (Transfers) nonverbal cues (demo/gesture);verbal cues;contact guard  -RM     Assistive Device (Bed-Chair Transfers) wheelchair  -RM       Row Name 09/06/23 1551          Chair-Bed Transfer    Chair-Bed [place] (Transfers) contact guard  -RM     Assistive Device (Chair-Bed Transfers) wheelchair;walker, front-wheeled  -RM       Row Name 09/06/23 1551          Sit-Stand Transfer    Sit-Stand [place] (Transfers) verbal cues;nonverbal cues (demo/gesture);contact guard  -RM     Assistive Device (Sit-Stand Transfers) walker, front-wheeled;wheelchair  -RM       Row Name 09/06/23 1551          Stand-Sit Transfer    Stand-Sit [place] (Transfers) verbal cues;nonverbal cues (demo/gesture);contact guard  -RM     Assistive Device (Stand-Sit Transfers) walker, front-wheeled;wheelchair  -RM       Row Name 09/06/23 1551          Stand Pivot/Stand Step Transfer    Stand Pivot/Stand Step [place] (Transfers) verbal cues;nonverbal cues (demo/gesture);contact guard  -RM     Assistive Device (Stand Pivot Stand Step Transfer) walker, front-wheeled  -RM       Row Name 09/06/23 1551          Toilet Transfer    Type (Toilet Transfer) stand pivot/stand step  -RM     [place] Level (Toilet Transfer) contact guard;nonverbal cues (demo/gesture);verbal cues  -RM     Assistive Device (Toilet Transfer) walker, front-wheeled;grab bars/safety frame  -       Row Name 09/06/23 1551          Car Transfer    Type (Car Transfer) stand pivot/stand step  -RM     [place] Level (Car Transfer) contact  guard;nonverbal cues (demo/gesture);verbal cues  -RM     Assistive Device (Car Transfer) walker, front-wheeled;wheelchair  -RM       Row Name 09/06/23 1551          Gait/Stairs (Locomotion)    Gait/Stairs Locomotion gait/ambulation independence;gait/ambulation assistive device;distance ambulated;gait pattern;gait deviations  -RM     [place] Level (Gait) verbal cues;nonverbal cues (demo/gesture);minimum assist (75% patient effort)  -RM     Assistive Device (Gait) walker, front-wheeled  -RM     Distance in Feet (Gait) 140  -RM     Pattern (Gait) step-through  cues for incr stride length and to keep SHELIA inside walker  -RM     Deviations/Abnormal Patterns (Gait) stride length decreased;gait speed decreased;casa decreased  -RM     Bilateral Gait Deviations forward flexed posture;heel strike decreased  -RM     Comment, (Gait/Stairs) Cues required for RW management as patient tends to keep walker at arms length and demonstrates forward trunk flexion with fatigue.  -RM       Row Name 09/06/23 1551          Safety Issues, Functional Mobility    Impairments Affecting Function (Mobility) balance;endurance/activity tolerance;strength;pain  -RM       Row Name 09/06/23 1551          Hip (Therapeutic Exercise)    Hip Strengthening (Therapeutic Exercise) bilateral;flexion;extension;aBduction;aDduction;heel slides;marching while seated;marching while standing;sitting;standing;2 lb free weight;resistance band;green;2 sets;10 repetitions  -RM       Row Name 09/06/23 1551          Knee (Therapeutic Exercise)    Knee Strengthening (Therapeutic Exercise) bilateral;flexion;extension;heel slides;marching while seated;marching while standing;LAQ (long arc quad);hamstring curls;sitting;standing;2 lb free weight;resistance band;green;2 sets;10 repetitions  -RM       Row Name 09/06/23 1551          Ankle (Therapeutic Exercise)    Ankle Strengthening (Therapeutic Exercise) bilateral;dorsiflexion;plantarflexion;sitting;standing;2 lb free  weight;2 sets;10 repetitions  -RM       Row Name 09/06/23 1551          Positioning and Restraints    Pre-Treatment Position in bed  -RM     Post Treatment Position bed  -RM     In Bed supine;call light within reach;encouraged to call for assist  PM  -RM     In Wheelchair sitting;with OT  AM  -RM       Row Name 09/06/23 1551          Therapy Assessment/Plan (PT)    Patient's Goals For Discharge return home  -RM       Row Name 09/06/23 1551          Daily Progress Summary (PT)    Functional Goal Overall Progress (PT) progressing toward functional goals as expected  -RM     Daily Progress Summary (PT) Improving independence noted with functional mobility and ambulation this date. Cues and assistance required for safety with transfer training. LE weakness improved, however, continually noted. Continued skilled care required for further LE strengthening to ensure maximum safe function upon D/C.  -RM     Impairments Still Limiting Function (PT) functional activity tolerance impairment;pain;strength deficit  -RM     Recommendations (PT) Continue per current POC  -RM       Row Name 09/06/23 1551          Therapy Plan Review/Discharge Plan (PT)    Anticipated Equipment Needs at Discharge (PT Eval) front wheeled walker;wheelchair  -RM     Anticipated Discharge Disposition (PT) home with assist;home with home health  -RM               User Key  (r) = Recorded By, (t) = Taken By, (c) = Cosigned By      Initials Name Provider Type    Patrizia Sauceda, SHITAL Physical Therapist Assistant                  Wound 08/23/23 1923 Right posterior heel (Active)   Dressing Appearance open to air 09/06/23 0836   Closure None 09/06/23 0836   Base non-blanchable;maroon/purple 09/06/23 0836   Periwound Temperature warm 09/05/23 2120   Periwound Skin Turgor soft 09/05/23 2120   Drainage Amount none 09/06/23 0836   Care, Wound barrier applied 09/06/23 0836   Dressing Care open to air 09/06/23 0836       Wound 08/23/23 2313 Left gluteal  (Active)   Dressing Appearance open to air 09/06/23 0836   Closure None 09/06/23 0836   Base non-blanchable 09/06/23 0836   Periwound dry;non-blanchable 09/06/23 0836   Periwound Temperature warm 09/05/23 2120   Periwound Skin Turgor soft 09/05/23 2120   Drainage Amount none 09/06/23 0836   Care, Wound barrier applied 09/06/23 0836   Dressing Care open to air 09/06/23 0836     Physical Therapy Education       Title: PT OT SLP Therapies (Done)       Topic: Physical Therapy (Done)       Point: Mobility training (Done)       Learning Progress Summary             Patient Acceptance, E,TB, VU by RM at 9/6/2023 1556    Acceptance, E,TB, VU by RM at 9/5/2023 1606    Acceptance, E,D, VU,NR by RG at 9/5/2023 1434    Acceptance, TB, NR by DL at 9/4/2023 2021    Acceptance, E,TB, VU by RM at 9/4/2023 1506    Acceptance, E,D, VU,NR by RG at 9/4/2023 1419    Acceptance, TB, NR by DL at 9/3/2023 2009    Acceptance, E,D, VU,NR by AG at 9/1/2023 1201    Acceptance, TB, NR by DL at 8/30/2023 2120    Acceptance, E,TB, VU by RM at 8/30/2023 1530    Acceptance, TB, NR by DL at 8/29/2023 2104    Acceptance, E,TB, VU by RM at 8/29/2023 1600    Acceptance, TB, NR by DL at 8/28/2023 2048    Acceptance, E,TB, VU,DU by HC at 8/28/2023 1451    Acceptance, D,E, VU,NR by RG at 8/28/2023 1436    Acceptance, TB, NR by DL at 8/27/2023 2309    Acceptance, E,D, VU,NR by RG at 8/26/2023 1522    Acceptance, E,D, VU,NR by LL at 8/25/2023 1525    Acceptance, E,D, VU,NR by RG at 8/25/2023 1433    Acceptance, E, VU,NR by LB at 8/24/2023 1149                         Point: Home exercise program (Done)       Learning Progress Summary             Patient Acceptance, E,TB, VU by RM at 9/6/2023 1556    Acceptance, E,TB, VU by RM at 9/5/2023 1606    Acceptance, E,D, VU,NR by RG at 9/5/2023 1434    Acceptance, TB, NR by DL at 9/4/2023 2021    Acceptance, E,TB, VU by RM at 9/4/2023 1506    Acceptance, E,D, VU,NR by RG at 9/4/2023 1419    Acceptance, TB, NR by DL  at 9/3/2023 2009    Acceptance, E,D, VU,NR by AG at 9/1/2023 1201    Acceptance, TB, NR by DL at 8/30/2023 2120    Acceptance, E,TB, VU by RM at 8/30/2023 1530    Acceptance, TB, NR by DL at 8/29/2023 2104    Acceptance, E,TB, VU by RM at 8/29/2023 1600    Acceptance, TB, NR by DL at 8/28/2023 2048    Acceptance, E,TB, VU,DU by HC at 8/28/2023 1451    Acceptance, D,E, VU,NR by RG at 8/28/2023 1436    Acceptance, TB, NR by DL at 8/27/2023 2309    Acceptance, E,D, VU,NR by RG at 8/26/2023 1522    Acceptance, E,D, VU,NR by LL at 8/25/2023 1525    Acceptance, E,D, VU,NR by RG at 8/25/2023 1433    Acceptance, E, VU,NR by LB at 8/24/2023 1149                         Point: Body mechanics (Done)       Learning Progress Summary             Patient Acceptance, E,TB, VU by RM at 9/6/2023 1556    Acceptance, E,TB, VU by RM at 9/5/2023 1606    Acceptance, E,D, VU,NR by RG at 9/5/2023 1434    Acceptance, TB, NR by DL at 9/4/2023 2021    Acceptance, E,TB, VU by RM at 9/4/2023 1506    Acceptance, E,D, VU,NR by RG at 9/4/2023 1419    Acceptance, TB, NR by DL at 9/3/2023 2009    Acceptance, E,D, VU,NR by AG at 9/1/2023 1201    Acceptance, TB, NR by DL at 8/30/2023 2120    Acceptance, E,TB, VU by RM at 8/30/2023 1530    Acceptance, TB, NR by DL at 8/29/2023 2104    Acceptance, E,TB, VU by RM at 8/29/2023 1600    Acceptance, TB, NR by DL at 8/28/2023 2048    Acceptance, E,TB, VU,DU by HC at 8/28/2023 1451    Acceptance, D,E, VU,NR by RG at 8/28/2023 1436    Acceptance, TB, NR by DL at 8/27/2023 2309    Acceptance, E,D, VU,NR by RG at 8/26/2023 1522    Acceptance, E,D, VU,NR by LL at 8/25/2023 1525    Acceptance, E,D, VU,NR by RG at 8/25/2023 1433    Acceptance, E, VU,NR by LB at 8/24/2023 1149                         Point: Precautions (Done)       Learning Progress Summary             Patient Acceptance, E,TB, VU by RM at 9/6/2023 1556    Acceptance, E,TB, VU by RM at 9/5/2023 1606    Acceptance, E,D, VU,NR by RG at 9/5/2023 1434     Acceptance, TB, NR by DL at 9/4/2023 2021    Acceptance, E,TB, VU by RM at 9/4/2023 1506    Acceptance, E,D, VU,NR by RG at 9/4/2023 1419    Acceptance, TB, NR by DL at 9/3/2023 2009    Acceptance, E,D, VU,NR by AG at 9/1/2023 1201    Acceptance, TB, NR by DL at 8/30/2023 2120    Acceptance, E,TB, VU by RM at 8/30/2023 1530    Acceptance, TB, NR by DL at 8/29/2023 2104    Acceptance, E,TB, VU by RM at 8/29/2023 1600    Acceptance, TB, NR by DL at 8/28/2023 2048    Acceptance, E,TB, VU,DU by HC at 8/28/2023 1451    Acceptance, D,E, VU,NR by RG at 8/28/2023 1436    Acceptance, TB, NR by DL at 8/27/2023 2309    Acceptance, E,D, VU,NR by RG at 8/26/2023 1522    Acceptance, E,D, VU,NR by LL at 8/25/2023 1525    Acceptance, E,D, VU,NR by RG at 8/25/2023 1433    Acceptance, E, VU,NR by LB at 8/24/2023 1149                                         User Key       Initials Effective Dates Name Provider Type Discipline    LB 06/16/21 -  Christel Ferguson, PT Physical Therapist PT    AG 06/16/21 -  Anu Chin, PT Physical Therapist PT    LL 05/02/16 -  Carly Lundberg, PTA Physical Therapist Assistant PT    RM 02/17/23 -  Patrizia Hope, PTA Physical Therapist Assistant PT    RG 06/16/21 -  Rajiv Pagan, PTA Physical Therapist Assistant PT    HC 01/20/23 -  Anaid Bo, PTA Physical Therapist Assistant PT    DL 03/16/22 -  Claudine Lundberg, RN Registered Nurse Nurse                    PT Recommendation and Plan    Frequency of Treatment (PT): 5 times per week  Anticipated Equipment Needs at Discharge (PT Eval): front wheeled walker, wheelchair  Daily Progress Summary (PT)  Functional Goal Overall Progress (PT): progressing toward functional goals as expected  Daily Progress Summary (PT): Improving independence noted with functional mobility and ambulation this date. Cues and assistance required for safety with transfer training. LE weakness improved, however, continually noted. Continued skilled care  required for further LE strengthening to ensure maximum safe function upon D/C.  Impairments Still Limiting Function (PT): functional activity tolerance impairment, pain, strength deficit  Recommendations (PT): Continue per current POC               Time Calculation:      PT Charges       Row Name 09/06/23 1558 09/06/23 1557          Time Calculation    Start Time 1245  -RM 1045  -RM     Stop Time 1330  -RM 1130  -RM     Time Calculation (min) 45 min  -RM 45 min  -RM     PT Received On 09/06/23  -RM 09/06/23  -RM     PT - Next Appointment 09/07/23  -RM 09/06/23  -RM     PT Goal Re-Cert Due Date 09/07/23  -RM 09/07/23  -RM        Time Calculation- PT    Total Timed Code Minutes- PT 45 minute(s)  -RM 45 minute(s)  -RM               User Key  (r) = Recorded By, (t) = Taken By, (c) = Cosigned By      Initials Name Provider Type    Patrizia Sauceda PTA Physical Therapist Assistant                    Therapy Charges for Today       Code Description Service Date Service Provider Modifiers Qty    78200467046 HC GAIT TRAINING EA 15 MIN 9/5/2023 Patrizia Hope, PTA GP, CQ 1    33705207891 HC PT THER PROC EA 15 MIN 9/5/2023 Patrizia Hope, PTA GP, CQ 1    04770582869 HC PT THERAPEUTIC ACT EA 15 MIN 9/5/2023 Patrizia Hope, PTA GP, CQ 1    29988261555 HC GAIT TRAINING EA 15 MIN 9/6/2023 Patrizia Hope, PTA GP, CQ 2    32829640444 HC PT THER PROC EA 15 MIN 9/6/2023 Patrizia Hope PTA GP, CQ 2    69033288816 HC PT THERAPEUTIC ACT EA 15 MIN 9/6/2023 Patrizia Hope, PTA GP, CQ 2              PT G-Codes  AM-PAC 6 Clicks Score (PT): 17      Patrizia Hope PTA  9/6/2023

## 2023-09-06 NOTE — PROGRESS NOTES
Occupational Therapy: Individual: 105 minutes.    Physical Therapy:    Speech Language Pathology:    Signed by: Yael Mendez OT

## 2023-09-06 NOTE — DISCHARGE INSTR - OTHER ORDERS
[facility] [phone] for PT/OT.  Pt had home and community based waiver aide services which will resume.      [facility] [phone] for rolling walker.

## 2023-09-06 NOTE — PROGRESS NOTES
Nephrology Progress Note      Subjective   Feels much better but still have bilateral lower extremity swelling    Objective       Vital signs :     Temp:  [98.2 °F (36.8 °C)-98.5 °F (36.9 °C)] 98.2 °F (36.8 °C)  Heart Rate:  [73-77] 77  Resp:  [16-18] 16  BP: (117-161)/(64-83) 161/75    Intake/Output                               09/04/23 0701 - 09/05/23 0700 09/05/23 0701 - 09/06/23 0700 09/06/23 0701 - 09/07/23 0700     1500-9352 4053-0375 Total 5270-8469 5591-0509 Total 3090-9011 1735-8685 Total                    Intake    P.O.  660  120 780  600  -- 600  120  -- 120    Total Intake 660 120 780 600 -- 600 120 -- 120       Output    Dialysis  1315  -- 1315  791  912 1703  --  -- --    Total Output 1315 -- 1315  -- -- --             Physical Exam:  General Appearance : alert, pleasant, appears stated age, cooperative and alert  Lungs : Decreased intensity of breath sounds  Heart :  regular rhythm & normal rate, normal S1, S2 and no murmur, no rub  Abdomen : soft, non distended  Extremities : 1+ edema  Neurologic :   orientated to person, place, time and situation, Grossly no focal deficits        Laboratory Data :     Albumin No results found for: ALBUMIN         Magnesium No results found for: MG           PTH               No results found for: PTH    CBC and coagulation:  Results from last 7 days   Lab Units 09/02/23 0100 08/31/23  1618 08/31/23  0239   WBC 10*3/mm3 4.65  --  4.32   HEMOGLOBIN g/dL 8.0*  --  7.6*   HEMATOCRIT % 26.3*  --  26.8*   MCV fL 97.4*  --  105.1*   MCHC g/dL 30.4*  --  28.4*   PLATELETS 10*3/mm3 147 165 100*     Acid/base balance:      Renal and electrolytes:    Results from last 7 days   Lab Units 09/02/23 0100 08/31/23  0239   SODIUM mmol/L 131* 129*   POTASSIUM mmol/L 4.8 4.9   CHLORIDE mmol/L 95* 96*   CO2 mmol/L 24.0 22.4   BUN mg/dL 109* 105*   CREATININE mg/dL 5.17* 5.12*   CALCIUM mg/dL 8.0* 7.7*     Estimated Creatinine Clearance: 9.6 mL/min (A) (by C-G formula  based on SCr of 5.17 mg/dL (H)).  @GFRCG:3@   Liver and pancreatic function:        Invalid input(s): PROT        Cardiac:      Liver and pancreatic function:        Invalid input(s): PROT      Medications :     amLODIPine, 5 mg, Oral, BID  aspirin, 81 mg, Oral, Daily  atorvastatin, 80 mg, Oral, Nightly  castor oil-balsam peru, 1 application , Topical, Q12H  epoetin earnest/earnest-epbx, 10,000 Units, Subcutaneous, Once per day on Mon Wed Fri  FLUoxetine, 40 mg, Oral, Daily  fluticasone, 2 spray, Each Nare, Daily  gentamicin, 1 application , Topical, Q24H  heparin (porcine), 5,000 Units, Subcutaneous, Q12H  insulin glargine, 25 Units, Subcutaneous, Daily  insulin lispro, 3-14 Units, Subcutaneous, TID With Meals  isosorbide mononitrate, 30 mg, Oral, Daily  levothyroxine, 50 mcg, Oral, Daily  lisinopril, 20 mg, Oral, Nightly  metoprolol succinate XL, 25 mg, Oral, Daily  multivitamin, 1 tablet, Oral, Daily  pantoprazole, 40 mg, Oral, Daily  rOPINIRole, 0.5 mg, Oral, BID  traZODone, 50 mg, Oral, Nightly             Assessment & Plan     1.  End-stage renal disease on peritoneal dialysis  2.  Type 2 diabetes with nephropathy  3.  Chronic diarrhea now worse  4.  New onset hypothyroidism  5.  Hyponatremia, multifactorial including hypovolemic  6.  Hypertension  7.  Malnutrition  8.  Diabetic neuropathy and myopathy with inability to ambulate independently  9.  Anemia of CKD    Continue dialysis, almost close to her dry weight.  If patient is going to be discharged tomorrow educated and counseled the patient to continue on 1.5 and 2.5% mix dialysate until resolution of bilateral lower extremity edema  educated and counseled on fluid restriction    Plan of care was discussed with the patient, understood verbalized agreed        Nicholas Arroyo MD  09/06/23  09:44 EDT

## 2023-09-06 NOTE — THERAPY TREATMENT NOTE
Inpatient Rehabilitation - Occupational Therapy Treatment Note    EDWARD Veliz     Patient Name: Reny Elena  : 1960  MRN: [id number]    Today's Date: 2023                 Admit Date: 2023       No diagnosis found.    Patient Active Problem List   Diagnosis    Tibia/fibula fracture    Iron deficiency anemia    Type 2 diabetes mellitus with hyperglycemia, with long-term current use of insulin    Wound of right ankle    Cellulitis    Metabolic encephalopathy    History of non-ST elevation myocardial infarction (NSTEMI)    HTN (hypertension)    CVA (cerebral vascular accident)    Chronic diastolic CHF (congestive heart failure)    Decubitus ulcer of coccyx, unspecified pressure ulcer stage    Depression    Expressive aphasia    Impaired mobility and ADLs    Hyperkalemia    Subdural hematoma    Severe malnutrition    Cerebrovascular accident (CVA), unspecified mechanism    PRES (posterior reversible encephalopathy syndrome)    Debility       Past Medical History:   Diagnosis Date    Arthritis     Closed fracture of right fibula and tibia 2018    Depression     Diabetic ulcer of left foot associated with type 2 diabetes mellitus 2017    Diastolic dysfunction     Disease of thyroid gland     Elevated cholesterol     End stage renal disease     Essential hypertension     GERD (gastroesophageal reflux disease)     History of transfusion     Hyperlipidemia     Iron deficiency anemia 2018    PAD (peripheral artery disease)     Renal failure     Type 2 diabetes mellitus with hyperglycemia, with long-term current use of insulin 2018       Past Surgical History:   Procedure Laterality Date    ABDOMINAL SURGERY      BACK SURGERY      Post spinal diskectomy, osteophytectomy in one lumbar interspace    CATARACT EXTRACTION      Left      SECTION      DENTAL PROCEDURE      ENDOSCOPY      FRACTURE SURGERY      right leg    HYSTERECTOMY      INCISION AND DRAINAGE LEG Left 4/15/2017     Procedure: INCISION AND DRAINAGE LOWER EXTREMITY;  Surgeon: Basilio Morris MD;  Location: [facility] OR;  Service:     INCISION AND DRAINAGE LEG Left 5/26/2017    Procedure: Irrigation and Debridment abcess diabetic wound left foot with deep culture;  Surgeon: Basilio Morris MD;  Location: [facility] OR;  Service:     INSERTION PERITONEAL DIALYSIS CATHETER N/A 12/16/2021    Procedure: INSERTION PERITONEAL DIALYSIS CATHETER LAPAROSCOPIC;  Surgeon: Edy Glover MD;  Location: [facility] OR;  Service: General;  Laterality: N/A;    KNEE ARTHROSCOPY Right     ORIF TIBIA FRACTURE Right 12/28/2018    Procedure: TIBIAL  FRACTURE OPEN REDUCTION INTERNAL FIXATION;  Surgeon: Jung Barragan MD;  Location: [facility] OR;  Service: Orthopedics    TOE AMPUTATION Right     5th    TUBAL ABDOMINAL LIGATION               IRF OT ASSESSMENT FLOWSHEET (last 12 hours)       IRF OT Evaluation and Treatment       Row Name 09/06/23 1443          OT Time and Intention    Document Type daily treatment  -BF     Mode of Treatment occupational therapy  -BF     Patient Effort good  -BF     Symptoms Noted During/After Treatment fatigue  -BF       Row Name 09/06/23 1443          General Information    Existing Precautions/Restrictions fall  hx L patellar subluxation; peritoneal dialysis port R abdomen  -BF       Row Name 09/06/23 1443          Cognition/Psychosocial    Orientation Status (Cognition) oriented x 3  -BF     Follows Commands (Cognition) follows one-step commands;verbal cues/prompting required  -BF       Row Name 09/06/23 1443          Lower Body Dressing    [place] Level (Lower Body Dressing) contact guard assist;verbal cues;nonverbal cues (demo/gesture)  -BF     Position (Lower Body Dressing) supported sitting  -BF     Comment (Lower Body Dressing) CGA  -BF       Row Name 09/06/23 1443          Chair-Bed Transfer    Chair-Bed [place] (Transfers) minimum assist (75% patient effort);verbal cues;nonverbal cues (demo/gesture)  -BF      Assistive Device (Chair-Bed Transfers) wheelchair  -BF       Row Name 09/06/23 1443          Motor Skills    Motor Control/Coordination Interventions fine motor manipulation/dexterity activities;gross motor coordination activities;therapeutic exercise/ROM  BUE GMC/FMC theract, light strengthening; BUE [place] 15 lbs X15X4, PRE 4 mins X3, flexbar therex  -BF       Row Name 09/06/23 1443          Positioning and Restraints    In Bed supine;call light within reach;encouraged to call for assist;legs elevated  after both sessions  -BF               User Key  (r) = Recorded By, (t) = Taken By, (c) = Cosigned By      Initials Name Effective Dates    BF Libertad Mendezmaritza Juarez, OT 07/11/23 -                      Occupational Therapy Education       Title: PT OT SLP Therapies (Done)       Topic: Occupational Therapy (Done)       Point: ADL training (Done)       Description:   Instruct learner(s) on proper safety adaptation and remediation techniques during self care or transfers.   Instruct in proper use of assistive devices.                  Learning Progress Summary             Patient Acceptance, E, VU,NR by BF at 9/6/2023 1443    Acceptance, TB, NR by DL at 9/4/2023 2021    Acceptance, E, VU,NR by BF at 9/4/2023 1343    Acceptance, TB, NR by DL at 9/3/2023 2009    Acceptance, E,D, VU,NR by TM at 9/1/2023 1251    Acceptance, E, VU,NR by BF at 8/31/2023 1430    Acceptance, E, NR by BF at 8/30/2023 1235    Acceptance, TB, NR by DL at 8/29/2023 2104    Acceptance, E, VU,NR by BF at 8/29/2023 1447    Acceptance, TB, NR by DL at 8/28/2023 2048    Acceptance, E,D, NR,VU by CJ at 8/28/2023 1505    Acceptance, TB, NR by DL at 8/27/2023 2309    Acceptance, E, VU,NR by HB at 8/26/2023 1417    Acceptance, E, VU,NR by BF at 8/25/2023 1438    Acceptance, E, VU,NR by BF at 8/24/2023 1450                         Point: Precautions (Done)       Description:   Instruct learner(s) on prescribed precautions during self-care and functional  transfers.                  Learning Progress Summary             Patient Acceptance, E, VU,NR by BF at 9/6/2023 1443    Acceptance, TB, NR by DL at 9/4/2023 2021    Acceptance, E, VU,NR by BF at 9/4/2023 1343    Acceptance, TB, NR by DL at 9/3/2023 2009    Acceptance, E,D, VU,NR by TM at 9/1/2023 1251    Acceptance, E, VU,NR by BF at 8/31/2023 1430    Acceptance, E, NR by BF at 8/30/2023 1235    Acceptance, TB, NR by DL at 8/29/2023 2104    Acceptance, E, VU,NR by BF at 8/29/2023 1447    Acceptance, TB, NR by DL at 8/28/2023 2048    Acceptance, E,D, NR,VU by CJ at 8/28/2023 1505    Acceptance, TB, NR by DL at 8/27/2023 2309    Acceptance, E, VU,NR by HB at 8/26/2023 1417    Acceptance, E, VU,NR by BF at 8/25/2023 1438    Acceptance, E, VU,NR by BF at 8/24/2023 1450                                         User Key       Initials Effective Dates Name Provider Type Discipline    BF 07/11/23 -  Yael Mendez OT Occupational Therapist OT    CJ 06/16/21 -  Nichelle Lagunas OT Occupational Therapist OT    TM 06/16/21 -  Olivia Rahman OT Occupational Therapist OT    HB 05/25/21 -  Ella Wallace OT Occupational Therapist OT    DL 03/16/22 -  Claudine Lundberg, RN Registered Nurse Nurse                        OT Recommendation and Plan    Planned Therapy Interventions (OT): activity tolerance training, adaptive equipment training, BADL retraining, ROM/therapeutic exercise, strengthening exercise, transfer/mobility retraining                    Time Calculation:      Time Calculation- OT       Row Name 09/06/23 1447 09/06/23 0915          Time Calculation- OT    OT Start Time 1330  -BF 0915  -BF     OT Stop Time 1430  -BF 1000  -BF     OT Time Calculation (min) 60 min  -BF 45 min  -BF     Total Timed Code Minutes- OT 60 minute(s)  -BF 45 minute(s)  -BF     OT Non-Billable Time (min) -- 10 min  -BF               User Key  (r) = Recorded By, (t) = Taken By, (c) = Cosigned By      Initials Name Provider Type     BF Yael Mendez, OT Occupational Therapist                  Therapy Charges for Today       Code Description Service Date Service Provider Modifiers Qty    20186752492 HC OT SELF CARE/MGMT/TRAIN EA 15 MIN 9/6/2023 Yael Mendez OT GO 1    67116732487 HC OT THERAPEUTIC ACT EA 15 MIN 9/6/2023 Yael Mendez OT GO 3    29154419126 HC OT THER PROC EA 15 MIN 9/6/2023 Yael Mendez OT GO 3                     Yael Mendez OT  9/6/2023

## 2023-09-06 NOTE — PLAN OF CARE
Goal Outcome Evaluation:    Problem: Rehabilitation (IRF) Plan of Care  Goal: Plan of Care Review  Outcome: Ongoing, Progressing  Flowsheets (Taken 9/6/2023 3618 by Tonya Aggarwal, RN)  Progress: no change  Plan of Care Reviewed With: patient  Goal: Patient-Specific Goal (Individualized)  Outcome: Ongoing, Progressing  Goal: Absence of New-Onset Illness or Injury  Outcome: Ongoing, Progressing  Goal: Optimal Comfort and Wellbeing  Outcome: Ongoing, Progressing  Goal: Home and Community Transition Plan Established  Outcome: Ongoing, Progressing

## 2023-09-06 NOTE — PROGRESS NOTES
Case Management  Inpatient Rehabilitation Team Conference    Conference Date/Time: 9/5/2023 8:03:28 AM    Team Conference Attendees:  MD Shanice Oneill SW Jessica Bill, GLORIA,   GLORIA Borges, PT  Tammie Dugan OT    Demographics            Age: 63Y            Gender: Female    Admission Date: 8/23/2023 7:00:00 PM  Rehabilitation Diagnosis:  debility  Comorbidities: N18.6 End stage renal disease  E46 Unspecified protein-calorie malnutrition  D63.1 Anemia in chronic kidney disease  E11.22 Type 2 diabetes mellitus with diabetic chronic kidney disease  Z99.2 Dependence on renal dialysis  E03.9 Hypothyroidism, unspecified  I50.32 Chronic diastolic (congestive) heart failure  E11.40 Type 2 diabetes mellitus with diabetic neuropathy, unspecified  E11.51 Type 2 diabetes mellitus with diabetic peripheral angiopathy without  gangrene  E78.00 Pure hypercholesterolemia, unspecified  G89.29 Other chronic pain  J30.9 Allergic rhinitis, unspecified  K21.9 Gastro-esophageal reflux disease without esophagitis  K52.9 Noninfective gastroenteritis and colitis, unspecified  Z79.4 Long term (current) use of insulin  Z79.890 Hormone replacement therapy  Z86.73 Personal history of transient ischemic attack (TIA), and cerebral  infarction without residual deficits  Z91.81 History of falling      Plan of Care  Anticipated Discharge Date/Estimated Length of Stay: 9-7-23  Anticipated Discharge Destination: Community discharge with assistance  Discharge Plan : Discharge plans will be determined based on caregiving needs.  Pt's significant other is in the hospital and he assisted with her care at home.  Medical Necessity Expected Level Rationale: good  Intensity and Duration: an average of 3 hours/5 days per week  Medical Supervision and 24 Hour Rehab Nursing: x  Physical Therapy: x  PT Intensity/Duration: PT 1.5 hours per day/5 days per week  Occupational Therapy: x  OT Intensity/Duration: OT 1.5  hours per day/5 days per week  Social Work: x  Therapeutic Recreation: x  Updated (if changes indicated)    Anticipated Discharge Date/Estimated Length of Stay:   9-7-23      Discharge Plan of Care: Home Health Services Physical Therapy strengthening,  endurance, gait training, transfer training, balance, therapeutic exercise, bed  mobility, home safety 3 times per week for 4 weeks Occupational Therapy ADL  re-training, functional mobility, strengthening,  endurance 3 times per week for  4 weeks Ambulatory: walker with wheels    Based on the patient's medical and functional status, their prognosis and  expected level of functional improvement is: good      Interdisciplinary Problem/Goals/Status  Safety    [RN] Potential for Injury(Active)  Current Status(08/23/2023): NO FALLS  Weekly Goal(09/08/2023): NO FALLS  Discharge Goal: NO FALLS        Body Systems    [RN] Integumentary(Active)  Current Status(08/23/2023): NO FURTHER BREAKDOWN  Weekly Goal(09/08/2023): NO FURTHER BREAKDOWN  Discharge Goal: NO FURTHER BREAKDOWN        Mobility    [PT] Bed/Chair/Wheelchair(Active)  Current Status(08/24/2023): min A  Weekly Goal(08/31/2023): MI  Discharge Goal: MI    [PT] Walk(Active)  Current Status(08/24/2023): amb 70' RW CGA  Weekly Goal(08/31/2023): amb 300' RW Sup  Discharge Goal: amb 300' RW Sup        Self Care    [OT] Toileting(Active)  Current Status(09/04/2023): Mod A  Weekly Goal(09/12/2023): Min A  Discharge Goal: CGA    Comments: Pt plans to return home with significant other at discharge assisting  with caregiving needs.    Signed by: PA Arnold    Physician CoSigned By: Yeison Braden 09/06/2023 08:20:52

## 2023-09-07 VITALS
WEIGHT: 124 LBS | BODY MASS INDEX: 21.17 KG/M2 | HEART RATE: 70 BPM | HEIGHT: 64 IN | SYSTOLIC BLOOD PRESSURE: 145 MMHG | OXYGEN SATURATION: 95 % | RESPIRATION RATE: 20 BRPM | TEMPERATURE: 98.2 F | DIASTOLIC BLOOD PRESSURE: 80 MMHG

## 2023-09-07 LAB — GLUCOSE BLDC GLUCOMTR-MCNC: 142 MG/DL (ref 70–130)

## 2023-09-07 PROCEDURE — 63710000001 INSULIN GLARGINE PER 5 UNITS: Performed by: FAMILY MEDICINE

## 2023-09-07 PROCEDURE — 82948 REAGENT STRIP/BLOOD GLUCOSE: CPT

## 2023-09-07 PROCEDURE — 25010000002 HEPARIN (PORCINE) PER 1000 UNITS: Performed by: INTERNAL MEDICINE

## 2023-09-07 PROCEDURE — 97530 THERAPEUTIC ACTIVITIES: CPT

## 2023-09-07 RX ORDER — GENTAMICIN SULFATE 1 MG/G
1 OINTMENT TOPICAL EVERY 24 HOURS
Qty: 15 G | Refills: 0 | Status: SHIPPED | OUTPATIENT
Start: 2023-09-07 | End: 2023-09-17

## 2023-09-07 RX ORDER — TRAZODONE HYDROCHLORIDE 50 MG/1
50 TABLET ORAL NIGHTLY
Qty: 30 TABLET | Refills: 0 | Status: SHIPPED | OUTPATIENT
Start: 2023-09-07 | End: 2023-10-07

## 2023-09-07 RX ORDER — CASTOR OIL AND BALSAM, PERU 788; 87 MG/G; MG/G
1 OINTMENT TOPICAL EVERY 12 HOURS SCHEDULED
Qty: 56.7 G | Refills: 0 | Status: SHIPPED | OUTPATIENT
Start: 2023-09-07 | End: 2023-09-17

## 2023-09-07 RX ORDER — AMLODIPINE BESYLATE 5 MG/1
5 TABLET ORAL 2 TIMES DAILY
Qty: 60 TABLET | Refills: 0 | Status: SHIPPED | OUTPATIENT
Start: 2023-09-07 | End: 2023-10-07

## 2023-09-07 RX ORDER — ASPIRIN 81 MG/1
81 TABLET ORAL DAILY
Qty: 30 TABLET | Refills: 0 | Status: SHIPPED | OUTPATIENT
Start: 2023-09-07 | End: 2023-10-07

## 2023-09-07 RX ORDER — LEVOTHYROXINE SODIUM 0.05 MG/1
50 TABLET ORAL DAILY
Qty: 30 TABLET | Refills: 0 | Status: SHIPPED | OUTPATIENT
Start: 2023-09-07 | End: 2023-10-07

## 2023-09-07 RX ORDER — ISOSORBIDE MONONITRATE 30 MG/1
30 TABLET, EXTENDED RELEASE ORAL DAILY
Qty: 30 TABLET | Refills: 0 | Status: SHIPPED | OUTPATIENT
Start: 2023-09-07 | End: 2023-10-07

## 2023-09-07 RX ORDER — INSULIN DETEMIR 100 [IU]/ML
25 INJECTION, SOLUTION SUBCUTANEOUS DAILY
Qty: 15 ML | Refills: 0 | Status: SHIPPED | OUTPATIENT
Start: 2023-09-07 | End: 2023-11-06

## 2023-09-07 RX ADMIN — FLUTICASONE PROPIONATE 2 SPRAY: 50 SPRAY, METERED NASAL at 09:45

## 2023-09-07 RX ADMIN — HEPARIN SODIUM 5000 UNITS: 5000 INJECTION INTRAVENOUS; SUBCUTANEOUS at 09:45

## 2023-09-07 RX ADMIN — CASTOR OIL AND BALSAM, PERU 1 APPLICATION: 788; 87 OINTMENT TOPICAL at 09:45

## 2023-09-07 RX ADMIN — LOPERAMIDE HYDROCHLORIDE 2 MG: 2 CAPSULE ORAL at 09:47

## 2023-09-07 RX ADMIN — PANTOPRAZOLE SODIUM 40 MG: 40 TABLET, DELAYED RELEASE ORAL at 09:44

## 2023-09-07 RX ADMIN — FLUOXETINE HYDROCHLORIDE 40 MG: 20 CAPSULE ORAL at 09:44

## 2023-09-07 RX ADMIN — ROPINIROLE HYDROCHLORIDE 0.5 MG: 0.25 TABLET, FILM COATED ORAL at 09:44

## 2023-09-07 RX ADMIN — ASPIRIN 81 MG: 81 TABLET, COATED ORAL at 09:44

## 2023-09-07 RX ADMIN — LEVOTHYROXINE SODIUM 50 MCG: 50 TABLET ORAL at 09:44

## 2023-09-07 RX ADMIN — AMLODIPINE BESYLATE 5 MG: 5 TABLET ORAL at 09:44

## 2023-09-07 RX ADMIN — INSULIN GLARGINE 25 UNITS: 100 INJECTION, SOLUTION SUBCUTANEOUS at 09:44

## 2023-09-07 RX ADMIN — Medication 1 TABLET: at 09:44

## 2023-09-07 RX ADMIN — ISOSORBIDE MONONITRATE 30 MG: 30 TABLET, EXTENDED RELEASE ORAL at 09:44

## 2023-09-07 RX ADMIN — METOPROLOL SUCCINATE 25 MG: 25 TABLET, EXTENDED RELEASE ORAL at 09:44

## 2023-09-07 NOTE — PROGRESS NOTES
"Patient Assessment Instrument  Quality Indicators - Discharge FY 2023    Section A. Transportation      Section A. Medication List  Subsequent Provider:  06 - Home under care of organized home health service  organization  Medication List to Subsequent Provider at Discharge:  Yes - Current reconciled  medication list provided to the subsequent provider  Route(s) of Medication List Transmission to Subsequent Provider:  Paper-based  (e.g., fax, copies, printouts)  Medication List to Patient:  Not applicable.  Patient discharged to a subsequent  provider as defined in 44D    Section B. Health Literacy  Frequency of Needing Assistance Reading:  Rarely    Section C. BIMS  Brief Interview for Mental Status (BIMS) was conducted.  Repetition of Three Words: Three words  Able to report correct year: Correct  Able to report correct month: Accurate within 5 days  Able to report correct day of the week: Correct  Able to recall \"sock\": Yes, after cuing  Able to recall \"blue\": Yes, after cuing  Able to recall \"bed\": Yes, no cue required    BIMS SUMMARY SCORE: 13 Cognitively intact    Section C. Signs and Symptoms of Delirium (from CAM)  Acute Change in Mental Status:   No  Inattention:   Behavior not present  Disorganized Thinking:   Behavior not present  Altered Level of Consciousness:   Behavior not present    Section D. Mood  Presence of little interest or pleasure in doing things:   No  Frequency of having little interest or pleasure in doing things:   Never or 1  day  Presence of feeling down, depressed, or hopeless:   No  Frequency of feeling down, depressed, or hopeless:   Never or 1 day   Interview Ended. Above responses do not meet criteria to continue  Total Severity Score:   00    Section D. Social Isolation  Frequency of Feeling Lonely or Isolated:  Never    Section GG. Self-Care Performance      Section GG. Mobility Performance      Section J. Health Conditions Discharge  Fall(s) Since Admission:  No    Section J. " Health Conditions (Pain)      Section K. Swallowing/Nutritional Status  Nutritional Approaches Past 7 Days:  Nutritional Approaches at Discharge:    Section M. Skin Conditions Discharge  Unhealed Pressure Ulcer(s)/Injurie(s) at Stage 1 or Higher:  Yes.    . Current Number of Unhealed Pressure Ulcers    Number of Unhealed Stage 1 Pressure Injuries: 1  Number of Unhealed Stage 2: 1  Number of Unhealed Stage 2 at Admission: 0  Number of Unhealed Stage 3: 0  Number of Unhealed Stage 4: 0  Number of Unhealed Unstageable Due to Non-removable Dressing/Device: 0  Number of Unhealed Unstageable Due to Slough/Eschar: 0  Number of Unhealed Unstageable Injuries Presenting as Deep Tissue Injury: 0    Section N. Medication    Medication Intervention: Not applicable - There were no potential clinically  significant medication issues identified since admission or patient is not  taking any medications.      Section O. Special Treatments, Procedures, and Programs  Dialysis: Peritoneal dialysis    Signed by: Peg Meneses Nurse

## 2023-09-07 NOTE — PROGRESS NOTES
Patient Assessment Instrument  Quality Indicators - Discharge FY 2023    Section A. Transportation      Section A. Medication List        Section B. Health Literacy      Section C. BIMS      Section C. Signs and Symptoms of Delirium (from CAM)      Section D. Mood      Section D. Social Isolation      Section GG. Self-Care Performance      Section GG. Mobility Performance     Roll Left and Right: [place] provides verbal cues and/or touching/steadying  and/or contact guard assistance as patient completes activity. Assistance may be  provided throughout the activity or intermittently.   Sit to Lying: [place] provides verbal cues and/or touching/steadying and/or  contact guard assistance as patient completes activity. Assistance may be  provided throughout the activity or intermittently.   Lying to Sitting on Side of Bed: [place] provides verbal cues and/or  touching/steadying and/or contact guard assistance as patient completes  activity. Assistance may be provided throughout the activity or intermittently.   Sit to Stand: [place] provides verbal cues and/or touching/steadying and/or  contact guard assistance as patient completes activity. Assistance may be  provided throughout the activity or intermittently.   Chair/Bed to Chair Transfer: [place] provides verbal cues and/or  touching/steadying and/or contact guard assistance as patient completes  activity. Assistance may be provided throughout the activity or intermittently.   Toilet Transfer [place] provides verbal cues and/or touching/steadying and/or  contact guard assistance as patient completes activity. Assistance may be  provided throughout the activity or intermittently.   Car Transfer: [place] provides verbal cues and/or touching/steadying and/or  contact guard assistance as patient completes activity. Assistance may be  provided throughout the activity or intermittently.   Walk 10 Feet:   [place] does less than half the effort. [place] lifts, holds or  supports  trunk or limbs but provides less than half the effort.  Walk 50 Feet with 2 Turns:   [place] does less than half the effort. [place]  lifts, holds or supports trunk or limbs but provides less than half the effort.  Walk 150 Feet:   [place] does all of the effort. Patient does none of the effort  to complete the activity. Or, the assistance of 2 or more helpers is required  for the patient to complete the activity.  Walking 10 Feet on Uneven Surfaces:   Not attempted due to medical or safety  concerns.  1 Step Over Curb or Up/Down Stair:   Not attempted due to medical or safety  concerns.  Picking up an Object:   Not attempted due to medical or safety concerns. Uses  Wheelchair and/or Scooter: Yes  Wheel 50 Feet with Two Turns: Not attempted due to medical or safety concerns.  Type of Wheelchair or Scooter Used: Manual  Wheel 150 Feet: Not attempted due to medical or safety concerns.  Type of Wheelchair or Scooter Used: Manual    Section J. Health Conditions Discharge      Section J. Health Conditions (Pain)      Section K. Swallowing/Nutritional Status  Nutritional Approaches Past 7 Days:  Nutritional Approaches at Discharge:    Section M. Skin Conditions Discharge      . Current Number of Unhealed Pressure Ulcers      Section N. Medication        Section O. Special Treatments, Procedures, and Programs    Signed by: Patrizia Hope PTA

## 2023-09-07 NOTE — THERAPY DISCHARGE NOTE
Inpatient Rehabilitation - Physical Therapy Treatment Note/Discharge  EDWARD Veliz     Patient Name: Reny Elena  : 1960  MRN: [id number]  Today's Date: 2023                Admit Date: 2023    Visit Dx:    ICD-10-CM ICD-9-CM   1. Debility  R53.81 799.3     Patient Active Problem List   Diagnosis    Tibia/fibula fracture    Iron deficiency anemia    Type 2 diabetes mellitus with hyperglycemia, with long-term current use of insulin    Wound of right ankle    Cellulitis    Metabolic encephalopathy    History of non-ST elevation myocardial infarction (NSTEMI)    HTN (hypertension)    CVA (cerebral vascular accident)    Chronic diastolic CHF (congestive heart failure)    Decubitus ulcer of coccyx, unspecified pressure ulcer stage    Depression    Expressive aphasia    Impaired mobility and ADLs    Hyperkalemia    Subdural hematoma    Severe malnutrition    Cerebrovascular accident (CVA), unspecified mechanism    PRES (posterior reversible encephalopathy syndrome)    Debility     Past Medical History:   Diagnosis Date    Arthritis     Closed fracture of right fibula and tibia 2018    Depression     Diabetic ulcer of left foot associated with type 2 diabetes mellitus 2017    Diastolic dysfunction     Disease of thyroid gland     Elevated cholesterol     End stage renal disease     Essential hypertension     GERD (gastroesophageal reflux disease)     History of transfusion     Hyperlipidemia     Iron deficiency anemia 2018    PAD (peripheral artery disease)     Renal failure     Type 2 diabetes mellitus with hyperglycemia, with long-term current use of insulin 2018     Past Surgical History:   Procedure Laterality Date    ABDOMINAL SURGERY      BACK SURGERY      Post spinal diskectomy, osteophytectomy in one lumbar interspace    CATARACT EXTRACTION      Left      SECTION      DENTAL PROCEDURE      ENDOSCOPY      FRACTURE SURGERY      right leg    HYSTERECTOMY      INCISION AND  DRAINAGE LEG Left 4/15/2017    Procedure: INCISION AND DRAINAGE LOWER EXTREMITY;  Surgeon: Basilio Morris MD;  Location: [facility];  Service:     INCISION AND DRAINAGE LEG Left 5/26/2017    Procedure: Irrigation and Debridment abcess diabetic wound left foot with deep culture;  Surgeon: Basilio Morris MD;  Location: [facility];  Service:     INSERTION PERITONEAL DIALYSIS CATHETER N/A 12/16/2021    Procedure: INSERTION PERITONEAL DIALYSIS CATHETER LAPAROSCOPIC;  Surgeon: Edy Glover MD;  Location: [facility];  Service: General;  Laterality: N/A;    KNEE ARTHROSCOPY Right     ORIF TIBIA FRACTURE Right 12/28/2018    Procedure: TIBIAL  FRACTURE OPEN REDUCTION INTERNAL FIXATION;  Surgeon: Jung Barragan MD;  Location: [facility];  Service: Orthopedics    TOE AMPUTATION Right     5th    TUBAL ABDOMINAL LIGATION         PT ASSESSMENT (last 12 hours)       IRF PT Evaluation and Treatment       Row Name 09/07/23 1512          PT Time and Intention    Document Type other (see comments)  discharge summary  -RM     Mode of Treatment physical therapy  -RM     Patient/Family/Caregiver Comments/Observations D/C planning performed with pt this date. Education provided on [employer] for home safety and importance of compliance with HEP for continued LE strengthening. Pts personal RW adjusted to proper height.  -RM       Row Name 09/07/23 1512          Positioning and Restraints    Pre-Treatment Position in bed  -RM     Post Treatment Position bed  -RM     In Bed supine;call light within reach;encouraged to call for assist  -RM       Row Name 09/07/23 1512          Bed Mobility Goal 1 (PT-IRF)    Progress/Outcomes (Bed Mobility Goal 1, PT-IRF) goal not met  -RM       Row Name 09/07/23 1512          Transfer Goal 1 (PT-IRF)    Progress/Outcomes (Transfer Goal 1, PT-IRF) goal not met  -RM       Row Name 09/07/23 1512          Gait/Walking Locomotion Goal 1 (PT-IRF)    Progress/Outcomes (Gait/Walking Locomotion Goal 1, PT-IRF) goal  not met  -RM       Row Name 09/07/23 1512          Stairs Goal 1 (PT-IRF)    Progress/Outcomes (Stairs Goal 1, PT-IRF) goal not met  -RM               User Key  (r) = Recorded By, (t) = Taken By, (c) = Cosigned By      Initials Name Provider Type    Patrizia Sauceda, SHITAL Physical Therapist Assistant                    Physical Therapy Education       Title: PT OT SLP Therapies (Resolved)       Topic: Physical Therapy (Resolved)       Point: Mobility training (Resolved)       Learning Progress Summary             Patient Acceptance, E,TB, VU by RM at 9/6/2023 1556    Acceptance, E,TB, VU by RM at 9/5/2023 1606    Acceptance, E,D, VU,NR by RG at 9/5/2023 1434    Acceptance, TB, NR by DL at 9/4/2023 2021    Acceptance, E,TB, VU by RM at 9/4/2023 1506    Acceptance, E,D, VU,NR by RG at 9/4/2023 1419    Acceptance, TB, NR by DL at 9/3/2023 2009    Acceptance, E,D, VU,NR by AG at 9/1/2023 1201    Acceptance, TB, NR by DL at 8/30/2023 2120    Acceptance, E,TB, VU by RM at 8/30/2023 1530    Acceptance, TB, NR by DL at 8/29/2023 2104    Acceptance, E,TB, VU by RM at 8/29/2023 1600    Acceptance, TB, NR by DL at 8/28/2023 2048    Acceptance, E,TB, VU,DU by HC at 8/28/2023 1451    Acceptance, D,E, VU,NR by RG at 8/28/2023 1436    Acceptance, TB, NR by DL at 8/27/2023 2309    Acceptance, E,D, VU,NR by RG at 8/26/2023 1522    Acceptance, E,D, VU,NR by LL at 8/25/2023 1525    Acceptance, E,D, VU,NR by RG at 8/25/2023 1433    Acceptance, E, VU,NR by LB at 8/24/2023 1149                         Point: Home exercise program (Resolved)       Learning Progress Summary             Patient Acceptance, E,TB, VU by RM at 9/6/2023 1556    Acceptance, E,TB, VU by RM at 9/5/2023 1606    Acceptance, E,D, VU,NR by RG at 9/5/2023 1434    Acceptance, TB, NR by DL at 9/4/2023 2021    Acceptance, E,TB, VU by RM at 9/4/2023 1506    Acceptance, E,D, VU,NR by RG at 9/4/2023 1419    Acceptance, TB, NR by DL at 9/3/2023 2009    Acceptance,  E,D, VU,NR by AG at 9/1/2023 1201    Acceptance, TB, NR by DL at 8/30/2023 2120    Acceptance, E,TB, VU by RM at 8/30/2023 1530    Acceptance, TB, NR by DL at 8/29/2023 2104    Acceptance, E,TB, VU by RM at 8/29/2023 1600    Acceptance, TB, NR by DL at 8/28/2023 2048    Acceptance, E,TB, VU,DU by HC at 8/28/2023 1451    Acceptance, D,E, VU,NR by RG at 8/28/2023 1436    Acceptance, TB, NR by DL at 8/27/2023 2309    Acceptance, E,D, VU,NR by RG at 8/26/2023 1522    Acceptance, E,D, VU,NR by LL at 8/25/2023 1525    Acceptance, E,D, VU,NR by RG at 8/25/2023 1433    Acceptance, E, VU,NR by LB at 8/24/2023 1149                         Point: Body mechanics (Resolved)       Learning Progress Summary             Patient Acceptance, E,TB, VU by RM at 9/6/2023 1556    Acceptance, E,TB, VU by RM at 9/5/2023 1606    Acceptance, E,D, VU,NR by RG at 9/5/2023 1434    Acceptance, TB, NR by DL at 9/4/2023 2021    Acceptance, E,TB, VU by RM at 9/4/2023 1506    Acceptance, E,D, VU,NR by RG at 9/4/2023 1419    Acceptance, TB, NR by DL at 9/3/2023 2009    Acceptance, E,D, VU,NR by AG at 9/1/2023 1201    Acceptance, TB, NR by DL at 8/30/2023 2120    Acceptance, E,TB, VU by RM at 8/30/2023 1530    Acceptance, TB, NR by DL at 8/29/2023 2104    Acceptance, E,TB, VU by RM at 8/29/2023 1600    Acceptance, TB, NR by DL at 8/28/2023 2048    Acceptance, E,TB, VU,DU by HC at 8/28/2023 1451    Acceptance, D,E, VU,NR by RG at 8/28/2023 1436    Acceptance, TB, NR by DL at 8/27/2023 2309    Acceptance, E,D, VU,NR by RG at 8/26/2023 1522    Acceptance, E,D, VU,NR by LL at 8/25/2023 1525    Acceptance, E,D, VU,NR by RG at 8/25/2023 1433    Acceptance, E, VU,NR by LB at 8/24/2023 1149                         Point: Precautions (Resolved)       Learning Progress Summary             Patient Acceptance, E,TB, VU by RM at 9/6/2023 1556    Acceptance, E,TB, VU by RM at 9/5/2023 1606    Acceptance, E,D, VU,NR by RG at 9/5/2023 1434    Acceptance, TB, NR by DL  at 9/4/2023 2021    Acceptance, E,TB, VU by RM at 9/4/2023 1506    Acceptance, E,D, VU,NR by RG at 9/4/2023 1419    Acceptance, TB, NR by DL at 9/3/2023 2009    Acceptance, E,D, VU,NR by AG at 9/1/2023 1201    Acceptance, TB, NR by DL at 8/30/2023 2120    Acceptance, E,TB, VU by RM at 8/30/2023 1530    Acceptance, TB, NR by DL at 8/29/2023 2104    Acceptance, E,TB, VU by RM at 8/29/2023 1600    Acceptance, TB, NR by DL at 8/28/2023 2048    Acceptance, E,TB, VU,DU by HC at 8/28/2023 1451    Acceptance, D,E, VU,NR by RG at 8/28/2023 1436    Acceptance, TB, NR by DL at 8/27/2023 2309    Acceptance, E,D, VU,NR by RG at 8/26/2023 1522    Acceptance, E,D, VU,NR by LL at 8/25/2023 1525    Acceptance, E,D, VU,NR by RG at 8/25/2023 1433    Acceptance, E, VU,NR by LB at 8/24/2023 1149                                         User Key       Initials Effective Dates Name Provider Type Discipline    LB 06/16/21 -  Christel Ferguson, PT Physical Therapist PT    AG 06/16/21 -  Anu Chin, PT Physical Therapist PT    LL 05/02/16 -  Carly Lundberg, PTA Physical Therapist Assistant PT    RM 02/17/23 -  Patrizia Hope, PTA Physical Therapist Assistant PT    RG 06/16/21 -  Rajiv Pagan PTA Physical Therapist Assistant PT    HC 01/20/23 -  Anaid Bo, PTA Physical Therapist Assistant PT    DL 03/16/22 -  Claudine Lundberg, RN Registered Nurse Nurse                    PT Recommendation and Plan  Frequency of Treatment (PT): 5 times per week  Anticipated Equipment Needs at Discharge (PT Eval): front wheeled walker, wheelchair  Daily Progress Summary (PT)  Functional Goal Overall Progress (PT): progressing toward functional goals as expected  Daily Progress Summary (PT): Improving independence noted with functional mobility and ambulation this date. Cues and assistance required for safety with transfer training. LE weakness improved, however, continually noted. Continued skilled care required for further LE  strengthening to ensure maximum safe function upon D/C.  Impairments Still Limiting Function (PT): functional activity tolerance impairment, pain, strength deficit  Recommendations (PT): Continue per current POC         Time Calculation:    PT Charges       Row Name 09/07/23 1513             Time Calculation    PT Received On 09/07/23  -RM         Time Calculation- PT    Total Timed Code Minutes- PT 15 minute(s)  -RM                User Key  (r) = Recorded By, (t) = Taken By, (c) = Cosigned By      Initials Name Provider Type    Patrizia Sauceda PTA Physical Therapist Assistant                    Therapy Charges for Today       Code Description Service Date Service Provider Modifiers Qty    98455587190 HC GAIT TRAINING EA 15 MIN 9/6/2023 Patrizia Hope PTA GP, CQ 2    57182792148 HC PT THER PROC EA 15 MIN 9/6/2023 Patrizia Hope PTA GP, CQ 2    32021913351 HC PT THERAPEUTIC ACT EA 15 MIN 9/6/2023 Patrizia Hope PTA GP, CQ 2    82062847520 HC PT THERAPEUTIC ACT EA 15 MIN 9/7/2023 Patrizia Hope PTA GP, CQ 1            PT G-Codes  AM-PAC 6 Clicks Score (PT): 17         Patrizia Hope PTA  9/7/2023

## 2023-09-07 NOTE — PROGRESS NOTES
Nephrology Progress Note      Subjective   Feels much better but still have bilateral lower extremity swelling    Objective       Vital signs :     Temp:  [98 °F (36.7 °C)-98.2 °F (36.8 °C)] 98 °F (36.7 °C)  Heart Rate:  [69-73] 73  Resp:  [16-18] 18  BP: (125-132)/(71-74) 125/71    Intake/Output                         09/05/23 0701 - 09/06/23 0700 09/06/23 0701 - 09/07/23 0700     8950-1983 5202-7578 Total 6045-6345 5251-1165 Total                 Intake    P.O.  600  -- 600  720  -- 720    Total Intake 600 -- 600 720 -- 720       Output    Dialysis  791  912 1703  --  1102 1102    Total Output  -- 1102 1102             Physical Exam:  General Appearance : alert, pleasant, appears stated age, cooperative and alert  Lungs : Decreased intensity of breath sounds  Heart :  regular rhythm & normal rate, normal S1, S2 and no murmur, no rub  Abdomen : soft, non distended  Extremities : 1+ edema  Neurologic :   orientated to person, place, time and situation, Grossly no focal deficits        Laboratory Data :     Albumin No results found for: ALBUMIN         Magnesium No results found for: MG           PTH               No results found for: PTH    CBC and coagulation:  Results from last 7 days   Lab Units 09/02/23  0100 08/31/23  1618   WBC 10*3/mm3 4.65  --    HEMOGLOBIN g/dL 8.0*  --    HEMATOCRIT % 26.3*  --    MCV fL 97.4*  --    MCHC g/dL 30.4*  --    PLATELETS 10*3/mm3 147 165     Acid/base balance:      Renal and electrolytes:    Results from last 7 days   Lab Units 09/02/23  0100   SODIUM mmol/L 131*   POTASSIUM mmol/L 4.8   CHLORIDE mmol/L 95*   CO2 mmol/L 24.0   BUN mg/dL 109*   CREATININE mg/dL 5.17*   CALCIUM mg/dL 8.0*     Estimated Creatinine Clearance: 9.6 mL/min (A) (by C-G formula based on SCr of 5.17 mg/dL (H)).  @GFRCG:3@   Liver and pancreatic function:        Invalid input(s): PROT        Cardiac:      Liver and pancreatic function:        Invalid input(s): PROT      Medications :      amLODIPine, 5 mg, Oral, BID  aspirin, 81 mg, Oral, Daily  atorvastatin, 80 mg, Oral, Nightly  castor oil-balsam peru, 1 application , Topical, Q12H  epoetin earnest/earnest-epbx, 10,000 Units, Subcutaneous, Once per day on Mon Wed Fri  FLUoxetine, 40 mg, Oral, Daily  fluticasone, 2 spray, Each Nare, Daily  gentamicin, 1 application , Topical, Q24H  heparin (porcine), 5,000 Units, Subcutaneous, Q12H  insulin glargine, 25 Units, Subcutaneous, Daily  insulin lispro, 3-14 Units, Subcutaneous, TID With Meals  isosorbide mononitrate, 30 mg, Oral, Daily  levothyroxine, 50 mcg, Oral, Daily  lisinopril, 20 mg, Oral, Nightly  metoprolol succinate XL, 25 mg, Oral, Daily  multivitamin, 1 tablet, Oral, Daily  pantoprazole, 40 mg, Oral, Daily  rOPINIRole, 0.5 mg, Oral, BID  traZODone, 50 mg, Oral, Nightly             Assessment & Plan     1.  End-stage renal disease on peritoneal dialysis  2.  Type 2 diabetes with nephropathy  3.  Chronic diarrhea now worse  4.  New onset hypothyroidism  5.  Hyponatremia, multifactorial including hypovolemic  6.  Hypertension  7.  Malnutrition  8.  Diabetic neuropathy and myopathy with inability to ambulate independently  9.  Anemia of CKD    Doing well like to go home fluid overload is significantly improved.  Educated and counseled the patient to use 2.5% in case of fluid overload otherwise continue on CCPD with 1.5% dialysate  Patient can be discharged home from nephrology standpoint and will continue her dialysis at home with the help of her   Plan of care was discussed with the patient, understood verbalized agreed        Nicholas Arroyo MD  09/07/23  09:04 EDT

## 2023-09-07 NOTE — THERAPY DISCHARGE NOTE
Inpatient Rehabilitation - IRF Occupational Therapy Progress Note/Discharge  EDWARD Veliz     Patient Name: Reny Elena  : 1960  MRN: [id number]  Today's Date: 2023               Admit Date: 2023       ICD-10-CM ICD-9-CM   1. Debility  R53.81 799.3     Patient Active Problem List   Diagnosis    Tibia/fibula fracture    Iron deficiency anemia    Type 2 diabetes mellitus with hyperglycemia, with long-term current use of insulin    Wound of right ankle    Cellulitis    Metabolic encephalopathy    History of non-ST elevation myocardial infarction (NSTEMI)    HTN (hypertension)    CVA (cerebral vascular accident)    Chronic diastolic CHF (congestive heart failure)    Decubitus ulcer of coccyx, unspecified pressure ulcer stage    Depression    Expressive aphasia    Impaired mobility and ADLs    Hyperkalemia    Subdural hematoma    Severe malnutrition    Cerebrovascular accident (CVA), unspecified mechanism    PRES (posterior reversible encephalopathy syndrome)    Debility     Past Medical History:   Diagnosis Date    Arthritis     Closed fracture of right fibula and tibia 2018    Depression     Diabetic ulcer of left foot associated with type 2 diabetes mellitus 2017    Diastolic dysfunction     Disease of thyroid gland     Elevated cholesterol     End stage renal disease     Essential hypertension     GERD (gastroesophageal reflux disease)     History of transfusion     Hyperlipidemia     Iron deficiency anemia 2018    PAD (peripheral artery disease)     Renal failure     Type 2 diabetes mellitus with hyperglycemia, with long-term current use of insulin 2018     Past Surgical History:   Procedure Laterality Date    ABDOMINAL SURGERY      BACK SURGERY      Post spinal diskectomy, osteophytectomy in one lumbar interspace    CATARACT EXTRACTION      Left      SECTION      DENTAL PROCEDURE      ENDOSCOPY      FRACTURE SURGERY      right leg    HYSTERECTOMY      INCISION AND  DRAINAGE LEG Left 4/15/2017    Procedure: INCISION AND DRAINAGE LOWER EXTREMITY;  Surgeon: Basilio Morris MD;  Location:  [facility] OR;  Service:     INCISION AND DRAINAGE LEG Left 5/26/2017    Procedure: Irrigation and Debridment abcess diabetic wound left foot with deep culture;  Surgeon: Basilio Morris MD;  Location:  [facility] OR;  Service:     INSERTION PERITONEAL DIALYSIS CATHETER N/A 12/16/2021    Procedure: INSERTION PERITONEAL DIALYSIS CATHETER LAPAROSCOPIC;  Surgeon: Edy Glover MD;  Location: [facility] OR;  Service: General;  Laterality: N/A;    KNEE ARTHROSCOPY Right     ORIF TIBIA FRACTURE Right 12/28/2018    Procedure: TIBIAL  FRACTURE OPEN REDUCTION INTERNAL FIXATION;  Surgeon: Jung Barragan MD;  Location: [facility] OR;  Service: Orthopedics    TOE AMPUTATION Right     5th    TUBAL ABDOMINAL LIGATION         IRF OT ASSESSMENT FLOWSHEET (last 12 hours)       IRF OT Evaluation and Treatment       Row Name 09/07/23 1349          OT Time and Intention    Document Type discharge evaluation  -BF     Mode of Treatment occupational therapy  -BF       Row Name 09/07/23 1349          General Information    Existing Precautions/Restrictions fall  hx L patellar subluxation; peritoneal dialysis port R abdomen  -BF       Row Name 09/07/23 1349          Cognition/Psychosocial    Orientation Status (Cognition) oriented x 3  -BF     Follows Commands (Cognition) verbal cues/prompting required;repetition of directions required;follows one-step commands  -BF       Row Name 09/07/23 1349          Bathing    Comment (Bathing) Min A  -BF       Row Name 09/07/23 1349          Upper Body Dressing    Comment (Upper Body Dressing) Set-up  -BF       Row Name 09/07/23 1349          Lower Body Dressing    Comment (Lower Body Dressing) CGA  -BF       Row Name 09/07/23 1349          Grooming    Comment (Grooming) Set-up  -BF       Row Name 09/07/23 1349          Toileting    Comment (Toileting) Min/CGA  -BF       Row Name 09/07/23 1349           Self-Feeding    Comment (Self-Feeding) Set-up  -               User Key  (r) = Recorded By, (t) = Taken By, (c) = Cosigned By      Initials Name Effective Dates    BF Yael Mendez, OT 07/11/23 -                   Wound 08/23/23 1923 Right posterior heel (Active)   Wound Image   09/07/23 1059   Dressing Appearance open to air 09/07/23 0708   Closure None 09/06/23 2130   Base non-blanchable;maroon/purple 09/06/23 2130   Drainage Amount none 09/06/23 2130   Dressing Care open to air 09/07/23 0708       Wound 08/23/23 2313 Left gluteal (Active)   Wound Image   09/07/23 1058   Dressing Appearance open to air 09/07/23 0708   Closure None 09/06/23 2130   Base non-blanchable 09/06/23 2130   Periwound dry;non-blanchable 09/06/23 2130   Drainage Amount none 09/06/23 2130   Dressing Care open to air 09/07/23 0708       Occupational Therapy Education       Title: PT OT SLP Therapies (Done)       Topic: Occupational Therapy (Resolved)       Point: ADL training (Resolved)       Description:   Instruct learner(s) on proper safety adaptation and remediation techniques during self care or transfers.   Instruct in proper use of assistive devices.                  Learning Progress Summary             Patient Acceptance, E, VU,NR by BF at 9/6/2023 1443    Acceptance, TB, NR by DL at 9/4/2023 2021    Acceptance, E, VU,NR by BF at 9/4/2023 1343    Acceptance, TB, NR by DL at 9/3/2023 2009    Acceptance, E,D, VU,NR by TM at 9/1/2023 1251    Acceptance, E, VU,NR by BF at 8/31/2023 1430    Acceptance, E, NR by BF at 8/30/2023 1235    Acceptance, TB, NR by DL at 8/29/2023 2104    Acceptance, E, VU,NR by BF at 8/29/2023 1447    Acceptance, TB, NR by DL at 8/28/2023 2048    Acceptance, E,D, NR,VU by CJ at 8/28/2023 1505    Acceptance, TB, NR by DL at 8/27/2023 2309    Acceptance, E, VU,NR by HB at 8/26/2023 1417    Acceptance, E, VU,NR by BF at 8/25/2023 1438    Acceptance, E, VU,NR by BF at 8/24/2023 1450                          Point: Home exercise program (Resolved)       Description:   Instruct learner(s) on appropriate technique for monitoring, assisting and/or progressing therapeutic exercises/activities.                  Learner Progress:  Not documented in this visit.              Point: Precautions (Resolved)       Description:   Instruct learner(s) on prescribed precautions during self-care and functional transfers.                  Learning Progress Summary             Patient Acceptance, E, VU,NR by BF at 9/6/2023 1443    Acceptance, TB, NR by DL at 9/4/2023 2021    Acceptance, E, VU,NR by BF at 9/4/2023 1343    Acceptance, TB, NR by DL at 9/3/2023 2009    Acceptance, E,D, VU,NR by TM at 9/1/2023 1251    Acceptance, E, VU,NR by BF at 8/31/2023 1430    Acceptance, E, NR by BF at 8/30/2023 1235    Acceptance, TB, NR by DL at 8/29/2023 2104    Acceptance, E, VU,NR by BF at 8/29/2023 1447    Acceptance, TB, NR by DL at 8/28/2023 2048    Acceptance, E,D, NR,VU by CJ at 8/28/2023 1505    Acceptance, TB, NR by DL at 8/27/2023 2309    Acceptance, E, VU,NR by HB at 8/26/2023 1417    Acceptance, E, VU,NR by BF at 8/25/2023 1438    Acceptance, E, VU,NR by BF at 8/24/2023 1450                         Point: Body mechanics (Resolved)       Description:   Instruct learner(s) on proper positioning and spine alignment during self-care, functional mobility activities and/or exercises.                  Learner Progress:  Not documented in this visit.                              User Key       Initials Effective Dates Name Provider Type Discipline    BF 07/11/23 -  Yael Mendez OT Occupational Therapist OT    CJ 06/16/21 -  Nichelle Lagunas OT Occupational Therapist OT    TM 06/16/21 -  Olivia Rahman, OT Occupational Therapist OT    HB 05/25/21 -  Ella Wallace OT Occupational Therapist OT    DL 03/16/22 -  Claudine Lundberg, RN Registered Nurse Nurse                    OT Recommendation and Plan  Planned Therapy  Interventions (OT): activity tolerance training, adaptive equipment training, BADL retraining, ROM/therapeutic exercise, strengthening exercise, transfer/mobility retraining           OT IRF GOALS       Row Name 09/07/23 1300 08/30/23 1200 08/26/23 1405       LB Dressing Goal 1 (OT-IRF)    Activity/Device (LB Dressing Goal 1, OT-IRF) lower body dressing  -BF lower body dressing  -BF --    Cornucopia (LB Dressing Goal 1, OT-IRF) contact guard required  -BF contact guard required  -BF --    Time Frame (LB Dressing Goal 1, OT-IRF) short-term goal (STG);1 week  -BF short-term goal (STG);1 week  -BF --    Progress/Outcomes (LB Dressing Goal 1, OT-IRF) goal met  -BF goal ongoing  -BF --       LB Dressing Goal 2 (OT-IRF)    Activity/Device (LB Dressing Goal 2, OT-IRF) lower body dressing  -BF lower body dressing  -BF --    Cornucopia (LB Dressing Goal 2, OT-IRF) standby assist  -BF standby assist  -BF --    Time Frame (LB Dressing Goal 2, OT-IRF) long-term goal (LTG);by discharge  -BF long-term goal (LTG);by discharge  -BF --    Progress/Outcomes (LB Dressing Goal 2, OT-IRF) goal not met  -BF goal ongoing  -BF --       Toileting Goal 1 (OT-IRF)    Activity/Device (Toileting Goal 1, OT-IRF) toileting skills, all  -BF toileting skills, all  -BF toileting skills, all  -HB    Cornucopia Level (Toileting Goal 1, OT-IRF) minimum assist (75% or more patient effort)  -BF minimum assist (75% or more patient effort)  -BF minimum assist (75% or more patient effort)  -HB    Progress/Outcomes (Toileting Goal 1, OT-IRF) goal met  -BF goal ongoing  -BF continuing progress toward goal  -HB    Time Frame (Toileting Goal 1, OT-IRF) short-term goal (STG);1 week  -BF short-term goal (STG);1 week  -BF short-term goal (STG);1 week  -HB       Toileting Goal 2 (OT-IRF)    Activity/Device (Toileting Goal 2, OT-IRF) toileting skills, all  -BF toileting skills, all  -BF toileting skills, all  -HB    [place]opia Level (Toileting Goal 2,  OT-IRF) contact guard required  -BF contact guard required  -BF contact guard required  -HB    Progress/Outcomes (Toileting Goal 2, OT-IRF) goal partially met  -BF goal ongoing  -BF continuing progress toward goal  -HB    Time Frame (Toileting Goal 2, OT-IRF) long-term goal (LTG);by discharge  -BF long-term goal (LTG);by discharge  -BF long-term goal (LTG);by discharge  -HB              User Key  (r) = Recorded By, (t) = Taken By, (c) = Cosigned By      Initials Name Provider Type    BF Yael Mendez OT Occupational Therapist    Ella Mariano OT Occupational Therapist                        Time Calculation:       Therapy Charges for Today       Code Description Service Date Service Provider Modifiers Qty    80895206602 HC OT SELF CARE/MGMT/TRAIN EA 15 MIN 9/6/2023 Yael Mendez OT GO 1    [id number] HC OT THERAPEUTIC ACT EA 15 MIN 9/6/2023 Yael Mendez OT GO 3    [id number]  OT THER PROC EA 15 MIN 9/6/2023 Yael Mendez OT GO 3                 OT Discharge Summary  Reason for Discharge: Discharge from facility  Outcomes Achieved: Patient able to partially acheive established goals  Discharge Destination: Home with home health, Home with assist    Yael Mendez OT  9/7/2023

## 2023-09-07 NOTE — DISCHARGE SUMMARY
[facility] DISCHARGE SUMMARY    Patient Identification:  Name:  Reny Elena  Age:  63 y.o.  Sex:  female  :  1960  MRN:  [id number]  Visit Number:  [id number]    Date of Admission: 2023  Date of Discharge:  2023     PCP: Susan Stephens APRN    DISCHARGE DIAGNOSIS  Debility  End-stage renal disease requiring peritoneal dialysis  Hypertension  Diabetes mellitus  Chronic leg pain/neuropathic pain  Allergic rhinitis  History of CVA  Hypothyroidism  CHF preserved EF  Chronic anemia  Peripheral arterial disease    CONSULTS   Dr. Arroyo    PROCEDURES PERFORMED      HOSPITAL COURSE  Patient is a 63 y.o. female presented to [facility] acute inpatient rehab in transfer with a history of multiple medical issues including end-stage renal disease requiring peritoneal dialysis diabetes mellitus, history, arthritis, hypothyroidism, CHF, GERD, hypertension, dyslipidemia and anemia.  She is also with multiple histories in the past and had multiple fractures in the right leg and the past which is required multiple fixations.  Patient has chronic pain is resolved.  Patient was brought to the hospital initially secondary to multiple missed courses of peritoneal dialysis.  Patient became extremely weak and her blood pressure was also uncontrolled.  Patient unable to ambulate and was thought to be candidate for inpatient physical rehab.    Debility--at the time of admission, requiring minimum assistance for bed mobility; minimum assistance for transfers; noted 60 feet and 70 feet with front wheel walker and contact-guard.  At the time of admission, patient requiring moderate assistance for up with bathing; set up for upper body dressing; minimum assist for lower body dressing; set up for grooming..  At time of discharge, requiring contact-guard for transfers; ambulated 140 feet with front wheel walker and minimum assistance.  Requiring contact-guard for lower body dressing.   Patient will continue home health physical therapy at discharge.  Will require rolling walker    End-stage renal disease continue home peritoneal dialysis.  We will arrange for follow-up with her nephrologist in a week    Hypertension is well controlled at discharge.    Hypothyroidism patient's TSH is markedly elevated we did increase Synthroid during the admission.  Would recommend recheck of TSH within the next 2 to 4 weeks.    CHF preserved EF compensated throughout the admission    Chronic anemia stable throughout the admission.    Diabetes mellitus  Changes to patient's insulin schedule during admission sugars have been better controlled over the last few days      VITAL SIGNS:  Temp:  [98 °F (36.7 °C)-98.2 °F (36.8 °C)] 98 °F (36.7 °C)  Heart Rate:  [69-73] 73  Resp:  [16-18] 18  BP: (125-132)/(71-74) 125/71  SpO2:  [95 %-97 %] 95 %  on   ;   Device (Oxygen Therapy): room air    Body mass index is 21.28 kg/m².  Wt Readings from Last 3 Encounters:   08/23/23 56.2 kg (124 lb)   08/23/23 56.7 kg (124 lb 14.4 oz)   03/04/23 42.2 kg (93 lb)       PHYSICAL EXAM:  Constitutional: No acute distress   HEENT: Normocephalic atraumatic  Neck:   Supple  Cardiovascular: Regular rate and rhythm  Pulmonary/Chest: Clear to auscultation  Abdominal: Positive bowel sounds soft.   Musculoskeletal: No arthropathy  Neurological: No focal deficits  Skin: No rash  Peripheral vascular: No edema  Genitourinary::    DISCHARGE DISPOSITION   Stable    DISCHARGE MEDICATIONS:     Discharge Medications        New Medications        Instructions Start Date   castor oil-balsam peru ointment   1 application , Topical, Every 12 Hours Scheduled      gentamicin 0.1 % ointment  Commonly known as: GARAMYCIN   1 application , Topical, Every 24 Hours             Changes to Medications        Instructions Start Date   amLODIPine 5 MG tablet  Commonly known as: NORVASC  What changed:   medication strength  how much to take  when to take this   5 mg,  Oral, 2 Times Daily      isosorbide mononitrate 30 MG 24 hr tablet  Commonly known as: IMDUR  What changed: Another medication with the same name was removed. Continue taking this medication, and follow the directions you see here.   30 mg, Oral, Daily      Levemir FlexPen 100 UNIT/ML injection  Generic drug: insulin detemir  What changed:   how much to take  when to take this   25 Units, Subcutaneous, Daily      levothyroxine 50 MCG tablet  Commonly known as: SYNTHROID, LEVOTHROID  What changed:   medication strength  how much to take   50 mcg, Oral, Daily      traZODone 50 MG tablet  Commonly known as: DESYREL  What changed:   medication strength  how much to take   50 mg, Oral, Nightly             Continue These Medications        Instructions Start Date   aspirin 81 MG EC tablet   81 mg, Oral, Daily      atorvastatin 80 MG tablet  Commonly known as: LIPITOR   80 mg, Oral, Nightly      FLUoxetine 40 MG capsule  Commonly known as: PROzac   40 mg, Oral, Every Morning      fluticasone 50 MCG/ACT nasal spray  Commonly known as: FLONASE   2 sprays, Nasal, Daily PRN      HYDROcodone-acetaminophen  MG per tablet  Commonly known as: NORCO   1 tablet, Oral, 3 Times Daily PRN      hydrOXYzine 25 MG tablet  Commonly known as: ATARAX   25 mg, Oral, 3 Times Daily PRN      insulin aspart 100 UNIT/ML injection  Commonly known as: novoLOG   3 Units, Subcutaneous, 3 Times Daily Before Meals      lisinopril 20 MG tablet  Commonly known as: PRINIVIL,ZESTRIL   20 mg, Oral, Daily PRN      loperamide 2 MG tablet  Commonly known as: IMODIUM A-D   2 mg, Oral, Daily PRN      metoprolol succinate XL 25 MG 24 hr tablet  Commonly known as: TOPROL-XL   25 mg, Oral, Daily      multivitamin tablet tablet   1 tablet, Oral, Daily      ondansetron ODT 4 MG disintegrating tablet  Commonly known as: ZOFRAN-ODT   4 mg, Translingual, Daily PRN      pantoprazole 40 MG EC tablet  Commonly known as: PROTONIX   40 mg, Oral, Daily      rOPINIRole  0.5 MG tablet  Commonly known as: REQUIP   0.5 mg, Oral, 2 Times Daily      tiZANidine 4 MG tablet  Commonly known as: ZANAFLEX   4 mg, Oral, Every 8 Hours PRN             Stop These Medications      gabapentin 300 MG capsule  Commonly known as: NEURONTIN     hydrALAZINE 10 MG tablet  Commonly known as: APRESOLINE     metoclopramide 5 MG tablet  Commonly known as: REGLAN               Your medication list        START taking these medications        Instructions Last Dose Given Next Dose Due   castor oil-balsam peru ointment      Apply 1 application  topically to the appropriate area as directed Every 12 (Twelve) Hours for 10 days.       gentamicin 0.1 % ointment  Commonly known as: GARAMYCIN      Apply 1 application  topically to the appropriate area as directed Daily for 10 days.              CHANGE how you take these medications        Instructions Last Dose Given Next Dose Due   amLODIPine 5 MG tablet  Commonly known as: NORVASC  What changed:   medication strength  how much to take  when to take this      Take 1 tablet by mouth 2 (Two) Times a Day for 30 days.       isosorbide mononitrate 30 MG 24 hr tablet  Commonly known as: IMDUR  What changed: Another medication with the same name was removed. Continue taking this medication, and follow the directions you see here.      Take 1 tablet by mouth Daily for 30 days.       Levemir FlexPen 100 UNIT/ML injection  Generic drug: insulin detemir  What changed:   how much to take  when to take this      Inject 25 Units under the skin into the appropriate area as directed Daily for 30 days.       levothyroxine 50 MCG tablet  Commonly known as: SYNTHROID, LEVOTHROID  What changed:   medication strength  how much to take      Take 1 tablet by mouth Daily for 30 days.       traZODone 50 MG tablet  Commonly known as: DESYREL  What changed:   medication strength  how much to take      Take 1 tablet by mouth Every Night for 30 days.              CONTINUE taking these  medications        Instructions Last Dose Given Next Dose Due   aspirin 81 MG EC tablet      Take 1 tablet by mouth Daily for 30 days.       atorvastatin 80 MG tablet  Commonly known as: LIPITOR      Take 1 tablet by mouth Every Night.       FLUoxetine 40 MG capsule  Commonly known as: PROzac      Take 1 capsule by mouth Every Morning.       fluticasone 50 MCG/ACT nasal spray  Commonly known as: FLONASE      2 sprays into the nostril(s) as directed by provider Daily As Needed for Allergies.       HYDROcodone-acetaminophen  MG per tablet  Commonly known as: NORCO      Take 1 tablet by mouth 3 (Three) Times a Day As Needed for Moderate Pain.       hydrOXYzine 25 MG tablet  Commonly known as: ATARAX      Take 1 tablet by mouth 3 (Three) Times a Day As Needed for Anxiety.       insulin aspart 100 UNIT/ML injection  Commonly known as: novoLOG      Inject 3 Units under the skin into the appropriate area as directed 3 (Three) Times a Day Before Meals.       lisinopril 20 MG tablet  Commonly known as: PRINIVIL,ZESTRIL      Take 1 tablet by mouth Daily As Needed (high blood pressure).       loperamide 2 MG tablet  Commonly known as: IMODIUM A-D      Take 1 tablet by mouth Daily As Needed for Diarrhea.       metoprolol succinate XL 25 MG 24 hr tablet  Commonly known as: TOPROL-XL      Take 1 tablet by mouth Daily.       multivitamin tablet tablet      Take 1 tablet by mouth Daily.       ondansetron ODT 4 MG disintegrating tablet  Commonly known as: ZOFRAN-ODT      Place 1 tablet on the tongue Daily As Needed for Nausea or Vomiting.       pantoprazole 40 MG EC tablet  Commonly known as: PROTONIX      Take 1 tablet by mouth Daily.       rOPINIRole 0.5 MG tablet  Commonly known as: REQUIP      Take 1 tablet by mouth 2 (Two) Times a Day.       tiZANidine 4 MG tablet  Commonly known as: ZANAFLEX      Take 1 tablet by mouth Every 8 (Eight) Hours As Needed for Muscle Spasms.              STOP taking these medications       gabapentin 300 MG capsule  Commonly known as: NEURONTIN        hydrALAZINE 10 MG tablet  Commonly known as: APRESOLINE        metoclopramide 5 MG tablet  Commonly known as: REGLAN                  Where to Get Your Medications        These medications were sent to [place] [address] [address] [id number]      Hours: Monday to Friday 7 AM to 6 PM Phone: [phone]   amLODIPine 5 MG tablet  aspirin 81 MG EC tablet  castor oil-balsam peru ointment  gentamicin 0.1 % ointment  isosorbide mononitrate 30 MG 24 hr tablet  Levemir FlexPen 100 UNIT/ML injection  levothyroxine 50 MCG tablet  traZODone 50 MG tablet         Diet Instructions       Diet: Diabetic Diets; Consistent Carbohydrate; Soft to Chew (NDD 3); Chopped Meat; Thin (IDDSI 0)      Discharge Diet: Diabetic Diets    Diabetic Diet: Consistent Carbohydrate    Texture: Soft to Chew (NDD 3)    Soft to Chew: Chopped Meat    Fluid Consistency: Thin (IDDSI 0)    Fluid restriction: 800cc/day          No future appointments.  Additional Instructions for the Follow-ups that You Need to Schedule       Ambulatory Referral to Home Health   As directed      Face to Face Visit Date: 9/7/2023   Follow-up provider for Plan of Care?: I treated the patient in an acute care facility and will not continue treatment after discharge.   Follow-up provider: SUSAN STEPHENS [[id number]]   Reason/Clinical Findings: debility   Describe mobility limitations that make leaving home difficult: debiility   Nursing/Therapeutic Services Requested: Physical Therapy Occupational Therapy   PT orders: Therapeutic exercise Gait Training Transfer training Strengthening Home safety assessment   Weight Bearing Status: As Tolerated   Occupational orders: Activities of daily living Strengthening Fine motor Home safety assessment   Frequency: 1 Week 1        Discharge Follow-up with PCP   As directed       Currently Documented PCP:    Susan Stephens APRN    PCP Phone Number:     [phone]     Follow Up Details: one week--debility//f/u esrd/dm/hypothyroidism        Discharge Follow-up with Specified Provider: Dr Sandy; 2 Weeks   As directed      To: Dr Sandy   Follow Up: 2 Weeks   Follow Up Details: followup of esrd/pd               Contact information for follow-up providers       Susan Stephens APRN. Go on 9/13/2023.    Specialty: Nurse Practitioner  Why: at 7:15am for hospital follow up.  Contact information:  [address] [place] [address]  [phone]               Susan Stephens APRN .    Specialty: Nurse Practitioner  Why: one week--debility//f/u esrd/dm/hypothyroidism  Contact information:  [address] [place] [address]  [phone]                       Contact information for after-discharge care       Durable Medical Equipment       [facility] [place]    Service: Durable Medical Equipment  Contact information:  [address] [id number]  [phone]                     Home Medical Care       [facility] .    Services: Home Health Services, Home Rehabilitation  Contact information:  [address] [facility] [address]  [phone]                                    TEST  RESULTS PENDING AT DISCHARGE       CODE STATUS  Code Status and Medical Interventions:   Ordered at: 08/23/23 2011     Code Status (Patient has no pulse and is not breathing):    CPR (Attempt to Resuscitate)     Medical Interventions (Patient has pulse or is breathing):    Full Support       Yeison Braden MD  [facility] Hospitalist  09/07/23  10:30 EDT    Please note that this discharge summary required less than 30 minutes to complete.

## 2023-09-07 NOTE — SIGNIFICANT NOTE
09/07/23 1130   Plan   Plan Informed pt about [facility]  PT, OT, and nurse to visit her on 9-8-23 to start services and [facility] scheduled for transport home around 12:15 pm, however, they can come earlier or later than this time.  Explained significant other is aware of discharge and plans for [facility] to transport; he will assist with transfer to her W/C when she arrives home.  Pt agreeable to plans.   Patient/Family in Agreement with Plan yes

## 2023-09-07 NOTE — SIGNIFICANT NOTE
09/07/23 1045   Plan   Plan Contacted [facility] [phone] per Kristin about discharge.  Faxed ambulatory referral for home health with face to face and discharge summary to HH via [employer].  Home health PT, OT and nurse will visit pt on 9-8-23 to start care.

## 2023-09-07 NOTE — PROGRESS NOTES
Patient Assessment Instrument  Quality Indicators - Discharge FY 2023    Section A. Transportation  Issues Due to Lack of Transportation:  No    Section A. Medication List        Section B. Health Literacy      Section C. BIMS      Section C. Signs and Symptoms of Delirium (from CAM)      Section D. Mood      Section D. Social Isolation      Section GG. Self-Care Performance      Section GG. Mobility Performance      Section J. Health Conditions Discharge      Section J. Health Conditions (Pain)      Section K. Swallowing/Nutritional Status  Nutritional Approaches Past 7 Days:  Nutritional Approaches at Discharge:    Section M. Skin Conditions Discharge      . Current Number of Unhealed Pressure Ulcers      Section N. Medication        Section O. Special Treatments, Procedures, and Programs    Signed by: PA Arnold

## 2023-09-07 NOTE — PROGRESS NOTES
Rehabilitation Nursing  Inpatient Rehabilitation Plan of Care Note    Plan of Care    Safety    Potential for Injury (Active)  Current Status (8/23/2023 7:00:00 PM): NO FALLS  Weekly Goal: NO FALLS  Discharge Goal: NO FALLS    Body Systems    Integumentary (Active)  Current Status (8/23/2023 7:00:00 PM): NO FURTHER BREAKDOWN  Weekly Goal: NO FURTHER BREAKDOWN  Discharge Goal: NO FURTHER BREAKDOWN    Signed by: Odilia Valdez RN

## 2023-09-07 NOTE — PLAN OF CARE
Problem: Rehabilitation (IRF) Plan of Care  Goal: Plan of Care Review  Outcome: Ongoing, Progressing  Flowsheets  Taken 9/7/2023 0233 by Cheryl Montelongo, RN  Progress: improving  Outcome Evaluation: Pt has rested well this shift. VSS. PRN medication given for pain as ordered. Progressing with plan of care.  Taken 9/6/2023 0524 by Tonya Aggarwal RN  Plan of Care Reviewed With: patient   Goal Outcome Evaluation:           Progress: improving  Outcome Evaluation: Pt has rested well this shift. VSS. PRN medication given for pain as ordered. Progressing with plan of care.

## 2023-09-07 NOTE — SIGNIFICANT NOTE
09/07/23 1100   Plan   Plan Contacted significant other Cliff [phone] about pt being discharged today, [facility]  PT, OT, nurse to visit on 9-8-23 to start care and [facility] to deliver RW to rehab.   Reviewed [facility] to be contacted to provide W/C van transport home.  Pt can transfer from W/C to seat on [employer] van and when pt arrives home significant other can help transfer pt to her W/C and he will help her get inside their home.  Cliff will resume caring for pt and getting pt hooked to peritoneal dialysis at home.   Patient/Family in Agreement with Plan yes

## 2023-09-07 NOTE — PROGRESS NOTES
Occupational Therapy:    Physical Therapy: Individual: 15 minutes.    Speech Language Pathology:    Signed by: Patrizia Hope PTA

## 2023-09-07 NOTE — SIGNIFICANT NOTE
09/07/23 111   Plan   Plan Spoke to Kalpana with [employer] [phone] about pt being discharged home today and need for W/C van to transport pt home.  [employer] van requested around 12:15 pm for pick-up.  Explained pt can ride in regular seat on [employer] and when she arrives home significant other can assist with transfer from seat to pt's W/C.  [employer]  to call nurse's station upon arrival in [facility] parking lot.  Faxed verification of discharge to [employer] [phone].

## 2023-09-07 NOTE — SIGNIFICANT NOTE
09/07/23 1035   Plan   Plan Spoke to Patrizia with [place] [address] about pt being discharged today and delivery of rolling walker to rehab this am.  Patrizia obtained MD order for rolling walker from epic.

## 2023-09-07 NOTE — SIGNIFICANT NOTE
09/07/23 1045   Plan   Plan Contacted [facility] [phone] per Kristin about discharge.  Faxed ambulatory referral for home health with face to face and discharge summary to HH via [employer].  Home health PT, OT and nurse will visit pt on 9-8-23 to start care.   Final Discharge Disposition Code 06 - home with home health care

## 2023-09-12 NOTE — PROGRESS NOTES
PPS CMG Coordinator  Inpatient Rehabilitation Discharge    Mode of Locomotion: Walking.    Discharge Against Medical Advice:  No.  Discharge Information  Patient Discharged Alive:  Yes  Discharge Destination/Living Setting: Home with Home Health Services  Diagnosis for Interruption/Death:    Impairment Group: 16 Debility (non-cardiac, non-pulmonary)    Comorbidities: Rank Code      Description      1    N18.6     End stage renal disease  2    E46       Unspecified protein-calorie malnutrition  3    D63.1     Anemia in chronic kidney disease  4    E11.22    Type 2 diabetes mellitus with diabetic chronic                 kidney disease  5    Z99.2     Dependence on renal dialysis  6    E03.9     Hypothyroidism, unspecified  7    I50.32    Chronic diastolic (congestive) heart failure  8    E11.40    Type 2 diabetes mellitus with diabetic                 neuropathy, unspecified  9    E11.51    Type 2 diabetes mellitus with diabetic                 peripheral angiopathy without gangrene  10   E78.00    Pure hypercholesterolemia, unspecified  11   G89.29    Other chronic pain  12   J30.9     Allergic rhinitis, unspecified  13   K21.9     Gastro-esophageal reflux disease without                 esophagitis  14   K52.9     Noninfective gastroenteritis and colitis,                 unspecified  15   Z79.4     Long term (current) use of insulin  16   Z79.890   Hormone replacement therapy  17   Z86.73    Personal history of transient ischemic attack                 (TIA), and cerebral infarction without residual                 deficits  18   Z91.81    History of falling    Complications:      LINO Bladder Accidents:   - Accidents.    LINO Bowel Accident:   - Accidents.    Signed by: Nurse Lima

## 2023-09-12 NOTE — PROGRESS NOTES
Patient Assessment Instrument  Quality Indicators - Discharge FY 2023    Section A. Transportation      Section A. Medication List        Section B. Health Literacy      Section C. BIMS      Section C. Signs and Symptoms of Delirium (from CAM)      Section D. Mood      Section D. Social Isolation      Section GG. Self-Care Performance      Section GG. Mobility Performance      Section J. Health Conditions Discharge  Fall(s) Since Admission:  No    Section J. Health Conditions (Pain)  Pain Effect on Sleep:   Rarely or not at all  Pain Interference with Therapy Activities:   Rarely or not at all  Pain Interference with Day-to-Day Activities:   Rarely or not at all    Section K. Swallowing/Nutritional Status  Nutritional Approaches Past 7 Days:  Mechanically altered diet - require change  in texture of food or liquids (e.g., pureed food, thickened liquids),  Therapeutic diet (e.g., low salt, diabetic, low cholesterol)  Nutritional Approaches at Discharge:  Mechanically altered diet - require change  in texture of food or liquids (e.g., pureed food, thickened liquids),  Therapeutic diet (e.g., low salt, diabetic, low cholesterol)    Section M. Skin Conditions Discharge      . Current Number of Unhealed Pressure Ulcers      Section N. Medication    Medication Intervention: Not applicable - There were no potential clinically  significant medication issues identified since admission or patient is not  taking any medications.  Patient is taking medications in the following pharmacological classification:  J. Hypoglycemic (including insulin) An indication is noted for all medications  in the Hypoglycemic drug class. F. Antibiotic An indication is noted for all  medications in the Antibiotic drug class. E. Anticoagulant An indication is  noted for all medications in the Anticoagulant drug class. H. Opioid An  indication is noted for all medications in the Opiod drug class. I. Antiplatelet  An indication is noted for all  medications in the Antiplatelet drug class.    Section O. Special Treatments, Procedures, and Programs    Signed by: Leanna Maradiaga, Supervisor

## 2023-10-24 ENCOUNTER — OFFICE VISIT (OUTPATIENT)
Dept: SURGERY | Facility: CLINIC | Age: 63
End: 2023-10-24
Payer: MEDICARE

## 2023-10-24 VITALS — HEIGHT: 65 IN | WEIGHT: 116 LBS | BODY MASS INDEX: 19.33 KG/M2

## 2023-10-24 DIAGNOSIS — N18.6 STAGE 5 CHRONIC KIDNEY DISEASE ON CHRONIC DIALYSIS: Primary | ICD-10-CM

## 2023-10-24 DIAGNOSIS — Z99.2 STAGE 5 CHRONIC KIDNEY DISEASE ON CHRONIC DIALYSIS: Primary | ICD-10-CM

## 2023-10-24 PROCEDURE — 1160F RVW MEDS BY RX/DR IN RCRD: CPT | Performed by: SURGERY

## 2023-10-24 PROCEDURE — 1159F MED LIST DOCD IN RCRD: CPT | Performed by: SURGERY

## 2023-10-24 PROCEDURE — 99213 OFFICE O/P EST LOW 20 MIN: CPT | Performed by: SURGERY

## 2023-10-24 RX ORDER — ASPIRIN 81 MG/1
81 TABLET ORAL DAILY
COMMUNITY

## 2023-10-24 RX ORDER — AMLODIPINE BESYLATE 5 MG/1
5 TABLET ORAL 2 TIMES DAILY
COMMUNITY

## 2023-10-24 NOTE — PROGRESS NOTES
Subjective   Reny Elena is a 63 y.o. female.     Chief Complaint: needs dialysis access    History of Present Illness She is a 64 yo who has been on peritoneal dialysis for a couple of years and now needs hemodialysis access because her  has been in the hospital and she has not been able to do her peritoneal dialysis at home. She has had prior collar bone fractures, but no prior lines.    The following portions of the patient's history were reviewed and updated as appropriate: current medications, past family history, past medical history, past social history, past surgical history and problem list.    Review of Systems   Constitutional:  Negative for activity change, appetite change, chills, fever and unexpected weight change.   HENT:  Negative for congestion, facial swelling and sore throat.    Eyes:  Negative for photophobia and visual disturbance.   Respiratory:  Positive for shortness of breath. Negative for chest tightness and wheezing.    Cardiovascular:  Positive for leg swelling. Negative for chest pain and palpitations.   Gastrointestinal:  Negative for abdominal distention, abdominal pain, anal bleeding, blood in stool, constipation, diarrhea, nausea, rectal pain and vomiting.   Endocrine: Negative for cold intolerance, heat intolerance, polydipsia and polyuria.   Genitourinary:  Negative for difficulty urinating, dysuria, flank pain and urgency.   Musculoskeletal:  Positive for arthralgias and myalgias. Negative for back pain.   Skin:  Negative for rash and wound.   Allergic/Immunologic: Negative for immunocompromised state.   Neurological:  Negative for dizziness, seizures, syncope, light-headedness, numbness and headaches.   Hematological:  Negative for adenopathy. Does not bruise/bleed easily.   Psychiatric/Behavioral:  Negative for behavioral problems and confusion. The patient is not nervous/anxious.        Objective   Physical Exam    Past Medical History:   Diagnosis Date    Arthritis      Closed fracture of right fibula and tibia 2018    Depression     Diabetic ulcer of left foot associated with type 2 diabetes mellitus 2017    Diastolic dysfunction     Disease of thyroid gland     Elevated cholesterol     End stage renal disease     Essential hypertension     GERD (gastroesophageal reflux disease)     History of transfusion     Hyperlipidemia     Iron deficiency anemia 2018    PAD (peripheral artery disease)     Renal failure     Type 2 diabetes mellitus with hyperglycemia, with long-term current use of insulin 2018       Family History   Problem Relation Age of Onset    Heart disease Mother     Cancer Mother     COPD Mother     Diabetes Other     Depression Other        Social History     Tobacco Use    Smoking status: Never    Smokeless tobacco: Never   Vaping Use    Vaping Use: Never used   Substance Use Topics    Alcohol use: No    Drug use: No       Past Surgical History:   Procedure Laterality Date    ABDOMINAL SURGERY      BACK SURGERY      Post spinal diskectomy, osteophytectomy in one lumbar interspace    CATARACT EXTRACTION      Left      SECTION      DENTAL PROCEDURE      ENDOSCOPY      FRACTURE SURGERY      right leg    HYSTERECTOMY      INCISION AND DRAINAGE LEG Left 4/15/2017    Procedure: INCISION AND DRAINAGE LOWER EXTREMITY;  Surgeon: Basilio Morris MD;  Location: [facility] OR;  Service:     INCISION AND DRAINAGE LEG Left 2017    Procedure: Irrigation and Debridment abcess diabetic wound left foot with deep culture;  Surgeon: Basilio Morris MD;  Location: [facility] OR;  Service:     INSERTION PERITONEAL DIALYSIS CATHETER N/A 2021    Procedure: INSERTION PERITONEAL DIALYSIS CATHETER LAPAROSCOPIC;  Surgeon: Edy Glover MD;  Location: [facility] OR;  Service: General;  Laterality: N/A;    KNEE ARTHROSCOPY Right     ORIF TIBIA FRACTURE Right 2018    Procedure: TIBIAL  FRACTURE OPEN REDUCTION INTERNAL FIXATION;  Surgeon: Jung Barragan MD;   Location: [facility] OR;  Service: Orthopedics    TOE AMPUTATION Right     5th    TUBAL ABDOMINAL LIGATION         Current Outpatient Medications   Medication Instructions    atorvastatin (LIPITOR) 80 mg, Oral, Nightly    FLUoxetine (PROZAC) 40 mg, Oral, Every Morning    fluticasone (FLONASE) 50 MCG/ACT nasal spray 2 sprays, Nasal, Daily PRN    HYDROcodone-acetaminophen (NORCO)  MG per tablet 1 tablet, Oral, 3 Times Daily PRN    hydrOXYzine (ATARAX) 25 mg, Oral, 3 Times Daily PRN    insulin aspart (NOVOLOG) 3 Units, Subcutaneous, 3 Times Daily Before Meals    isosorbide mononitrate (IMDUR) 30 mg, Oral, Daily    Levemir FlexPen 25 Units, Subcutaneous, Daily    levothyroxine (SYNTHROID, LEVOTHROID) 50 mcg, Oral, Daily    lisinopril (PRINIVIL,ZESTRIL) 20 mg, Oral, Daily PRN    loperamide (IMODIUM A-D) 2 mg, Oral, Daily PRN    metoprolol succinate XL (TOPROL-XL) 25 mg, Oral, Daily    multivitamin (multivitamin) tablet tablet 1 tablet, Oral, Daily    ondansetron ODT (ZOFRAN-ODT) 4 mg, Translingual, Daily PRN    pantoprazole (PROTONIX) 40 mg, Oral, Daily    rOPINIRole (REQUIP) 0.5 mg, Oral, 2 Times Daily    tiZANidine (ZANAFLEX) 4 mg, Oral, Every 8 Hours PRN    traZODone (DESYREL) 50 mg, Oral, Nightly         Assessment & Plan   Diagnoses and all orders for this visit:    1. Stage 5 chronic kidney disease on chronic dialysis (Primary)    Place tunneled dialysis catheter

## 2023-10-25 PROBLEM — Z99.2 STAGE 5 CHRONIC KIDNEY DISEASE ON CHRONIC DIALYSIS: Status: ACTIVE | Noted: 2023-10-24

## 2023-10-25 PROBLEM — N18.6 STAGE 5 CHRONIC KIDNEY DISEASE ON CHRONIC DIALYSIS: Status: ACTIVE | Noted: 2023-10-24

## 2023-10-26 ENCOUNTER — TRANSCRIBE ORDERS (OUTPATIENT)
Dept: ADMINISTRATIVE | Facility: HOSPITAL | Age: 63
End: 2023-10-26
Payer: MEDICARE

## 2023-10-26 DIAGNOSIS — R91.1 LUNG NODULE: Primary | ICD-10-CM

## 2023-11-03 ENCOUNTER — TELEPHONE (OUTPATIENT)
Dept: SURGERY | Facility: CLINIC | Age: 63
End: 2023-11-03
Payer: MEDICARE

## 2023-11-03 NOTE — TELEPHONE ENCOUNTER
Patient was originally scheduled for surgery on 10/27/23. Patient cancelled surgery. I tried to get in touch with her to see if she would like to RS. Patient was seen in office on 10/24/23 and has until 11/24 to RS surgery without being required another office visit and remain in the 30 day timeframe required by insurance.

## 2023-11-06 ENCOUNTER — TELEPHONE (OUTPATIENT)
Dept: SURGERY | Facility: CLINIC | Age: 63
End: 2023-11-06
Payer: MEDICARE

## 2023-12-14 ENCOUNTER — APPOINTMENT (OUTPATIENT)
Dept: GENERAL RADIOLOGY | Facility: HOSPITAL | Age: 63
End: 2023-12-14
Payer: MEDICARE

## 2023-12-14 ENCOUNTER — HOSPITAL ENCOUNTER (EMERGENCY)
Facility: HOSPITAL | Age: 63
Discharge: HOME | End: 2023-12-14
Attending: STUDENT IN AN ORGANIZED HEALTH CARE EDUCATION/TRAINING PROGRAM
Payer: MEDICARE

## 2023-12-14 ENCOUNTER — APPOINTMENT (OUTPATIENT)
Dept: CT IMAGING | Facility: HOSPITAL | Age: 63
End: 2023-12-14
Payer: MEDICARE

## 2023-12-14 VITALS
HEIGHT: 65 IN | RESPIRATION RATE: 20 BRPM | SYSTOLIC BLOOD PRESSURE: 92 MMHG | TEMPERATURE: 98.2 F | OXYGEN SATURATION: 95 % | HEART RATE: 67 BPM | WEIGHT: 113 LBS | DIASTOLIC BLOOD PRESSURE: 66 MMHG | BODY MASS INDEX: 18.83 KG/M2

## 2023-12-14 DIAGNOSIS — N18.6 ESRD ON PERITONEAL DIALYSIS: ICD-10-CM

## 2023-12-14 DIAGNOSIS — R10.84 GENERALIZED ABDOMINAL PAIN: ICD-10-CM

## 2023-12-14 DIAGNOSIS — Z99.2 ESRD ON PERITONEAL DIALYSIS: ICD-10-CM

## 2023-12-14 DIAGNOSIS — R11.0 NAUSEA: Primary | ICD-10-CM

## 2023-12-14 LAB
ALBUMIN SERPL-MCNC: 2.4 G/DL (ref 3.5–5.2)
ALBUMIN/GLOB SERPL: 1.1 G/DL
ALP SERPL-CCNC: 90 U/L (ref 39–117)
ALT SERPL W P-5'-P-CCNC: 7 U/L (ref 1–33)
ANION GAP SERPL CALCULATED.3IONS-SCNC: 9.2 MMOL/L (ref 5–15)
AST SERPL-CCNC: 15 U/L (ref 1–32)
BASOPHILS # BLD AUTO: 0.04 10*3/MM3 (ref 0–0.2)
BASOPHILS NFR BLD AUTO: 1 % (ref 0–1.5)
BILIRUB SERPL-MCNC: 0.2 MG/DL (ref 0–1.2)
BUN SERPL-MCNC: 33 MG/DL (ref 8–23)
BUN/CREAT SERPL: 6.2 (ref 7–25)
CALCIUM SPEC-SCNC: 7 MG/DL (ref 8.6–10.5)
CHLORIDE SERPL-SCNC: 107 MMOL/L (ref 98–107)
CO2 SERPL-SCNC: 23.8 MMOL/L (ref 22–29)
CREAT SERPL-MCNC: 5.36 MG/DL (ref 0.57–1)
DEPRECATED RDW RBC AUTO: 53.7 FL (ref 37–54)
EGFRCR SERPLBLD CKD-EPI 2021: 8.4 ML/MIN/1.73
EOSINOPHIL # BLD AUTO: 0.16 10*3/MM3 (ref 0–0.4)
EOSINOPHIL NFR BLD AUTO: 4 % (ref 0.3–6.2)
ERYTHROCYTE [DISTWIDTH] IN BLOOD BY AUTOMATED COUNT: 15 % (ref 12.3–15.4)
FLUAV RNA RESP QL NAA+PROBE: NOT DETECTED
FLUBV RNA RESP QL NAA+PROBE: NOT DETECTED
GLOBULIN UR ELPH-MCNC: 2.1 GM/DL
GLUCOSE SERPL-MCNC: 176 MG/DL (ref 65–99)
HCT VFR BLD AUTO: 30.6 % (ref 34–46.6)
HGB BLD-MCNC: 9.1 G/DL (ref 12–15.9)
IMM GRANULOCYTES # BLD AUTO: 0.02 10*3/MM3 (ref 0–0.05)
IMM GRANULOCYTES NFR BLD AUTO: 0.5 % (ref 0–0.5)
LIPASE SERPL-CCNC: 12 U/L (ref 13–60)
LYMPHOCYTES # BLD AUTO: 1.78 10*3/MM3 (ref 0.7–3.1)
LYMPHOCYTES NFR BLD AUTO: 44.9 % (ref 19.6–45.3)
MCH RBC QN AUTO: 29.2 PG (ref 26.6–33)
MCHC RBC AUTO-ENTMCNC: 29.7 G/DL (ref 31.5–35.7)
MCV RBC AUTO: 98.1 FL (ref 79–97)
MONOCYTES # BLD AUTO: 0.26 10*3/MM3 (ref 0.1–0.9)
MONOCYTES NFR BLD AUTO: 6.6 % (ref 5–12)
NEUTROPHILS NFR BLD AUTO: 1.7 10*3/MM3 (ref 1.7–7)
NEUTROPHILS NFR BLD AUTO: 43 % (ref 42.7–76)
NRBC BLD AUTO-RTO: 0 /100 WBC (ref 0–0.2)
PLATELET # BLD AUTO: 110 10*3/MM3 (ref 140–450)
PMV BLD AUTO: 9.4 FL (ref 6–12)
POTASSIUM SERPL-SCNC: 5.7 MMOL/L (ref 3.5–5.2)
PROT SERPL-MCNC: 4.5 G/DL (ref 6–8.5)
RBC # BLD AUTO: 3.12 10*6/MM3 (ref 3.77–5.28)
SARS-COV-2 RNA RESP QL NAA+PROBE: NOT DETECTED
SODIUM SERPL-SCNC: 140 MMOL/L (ref 136–145)
WBC NRBC COR # BLD AUTO: 3.96 10*3/MM3 (ref 3.4–10.8)

## 2023-12-14 PROCEDURE — 25010000002 ONDANSETRON PER 1 MG: Performed by: STUDENT IN AN ORGANIZED HEALTH CARE EDUCATION/TRAINING PROGRAM

## 2023-12-14 PROCEDURE — 74177 CT ABD & PELVIS W/CONTRAST: CPT | Performed by: RADIOLOGY

## 2023-12-14 PROCEDURE — 99285 EMERGENCY DEPT VISIT HI MDM: CPT

## 2023-12-14 PROCEDURE — 80053 COMPREHEN METABOLIC PANEL: CPT | Performed by: STUDENT IN AN ORGANIZED HEALTH CARE EDUCATION/TRAINING PROGRAM

## 2023-12-14 PROCEDURE — 71045 X-RAY EXAM CHEST 1 VIEW: CPT | Performed by: RADIOLOGY

## 2023-12-14 PROCEDURE — 25510000001 IOPAMIDOL 61 % SOLUTION: Performed by: STUDENT IN AN ORGANIZED HEALTH CARE EDUCATION/TRAINING PROGRAM

## 2023-12-14 PROCEDURE — 83690 ASSAY OF LIPASE: CPT | Performed by: STUDENT IN AN ORGANIZED HEALTH CARE EDUCATION/TRAINING PROGRAM

## 2023-12-14 PROCEDURE — 71045 X-RAY EXAM CHEST 1 VIEW: CPT

## 2023-12-14 PROCEDURE — 87636 SARSCOV2 & INF A&B AMP PRB: CPT | Performed by: STUDENT IN AN ORGANIZED HEALTH CARE EDUCATION/TRAINING PROGRAM

## 2023-12-14 PROCEDURE — 96374 THER/PROPH/DIAG INJ IV PUSH: CPT

## 2023-12-14 PROCEDURE — 93005 ELECTROCARDIOGRAM TRACING: CPT | Performed by: STUDENT IN AN ORGANIZED HEALTH CARE EDUCATION/TRAINING PROGRAM

## 2023-12-14 PROCEDURE — 85025 COMPLETE CBC W/AUTO DIFF WBC: CPT | Performed by: STUDENT IN AN ORGANIZED HEALTH CARE EDUCATION/TRAINING PROGRAM

## 2023-12-14 PROCEDURE — 74177 CT ABD & PELVIS W/CONTRAST: CPT

## 2023-12-14 RX ORDER — ONDANSETRON 2 MG/ML
4 INJECTION INTRAMUSCULAR; INTRAVENOUS ONCE
Status: COMPLETED | OUTPATIENT
Start: 2023-12-14 | End: 2023-12-14

## 2023-12-14 RX ORDER — HYDROXYZINE HYDROCHLORIDE 25 MG/1
25 TABLET, FILM COATED ORAL ONCE
Status: COMPLETED | OUTPATIENT
Start: 2023-12-14 | End: 2023-12-14

## 2023-12-14 RX ADMIN — IOPAMIDOL 75 ML: 612 INJECTION, SOLUTION INTRAVENOUS at 18:14

## 2023-12-14 RX ADMIN — ONDANSETRON 4 MG: 2 INJECTION INTRAMUSCULAR; INTRAVENOUS at 16:48

## 2023-12-14 RX ADMIN — HYDROXYZINE HYDROCHLORIDE 25 MG: 25 TABLET, FILM COATED ORAL at 19:29
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
## 2023-12-15 LAB
QT INTERVAL: 432 MS
QTC INTERVAL: 462 MS
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
## 2023-12-15 NOTE — ED NOTES
Spoke with patient's son Cliff via telephone and he reports that he has called [facility] to come  patient and he will pay them when she arrives at her home.
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
## 2023-12-15 NOTE — ED NOTES
Attempted to call pts daughter Liza at [phone] and received a voice mail that has not been setup yet.

## 2024-04-15 ENCOUNTER — APPOINTMENT (OUTPATIENT)
Dept: GENERAL RADIOLOGY | Facility: HOSPITAL | Age: 64
End: 2024-04-15
Payer: MEDICARE

## 2024-04-15 ENCOUNTER — APPOINTMENT (OUTPATIENT)
Dept: CT IMAGING | Facility: HOSPITAL | Age: 64
End: 2024-04-15
Payer: MEDICARE

## 2024-04-15 ENCOUNTER — HOSPITAL ENCOUNTER (EMERGENCY)
Facility: HOSPITAL | Age: 64
Discharge: HOME | End: 2024-04-16
Attending: STUDENT IN AN ORGANIZED HEALTH CARE EDUCATION/TRAINING PROGRAM | Admitting: STUDENT IN AN ORGANIZED HEALTH CARE EDUCATION/TRAINING PROGRAM
Payer: MEDICARE

## 2024-04-15 DIAGNOSIS — E87.5 HYPERKALEMIA: Primary | ICD-10-CM

## 2024-04-15 LAB
A-A DO2: 9.9 MMHG (ref 0–300)
ALBUMIN SERPL-MCNC: 2.5 G/DL (ref 3.5–5.2)
ALBUMIN/GLOB SERPL: 1.2 G/DL
ALP SERPL-CCNC: 54 U/L (ref 39–117)
ALT SERPL W P-5'-P-CCNC: 11 U/L (ref 1–33)
AMMONIA BLD-SCNC: 30 UMOL/L (ref 11–51)
AMPHET+METHAMPHET UR QL: NEGATIVE
AMPHETAMINES UR QL: NEGATIVE
ANION GAP SERPL CALCULATED.3IONS-SCNC: 11.6 MMOL/L (ref 5–15)
ANION GAP SERPL CALCULATED.3IONS-SCNC: 8.5 MMOL/L (ref 5–15)
APAP SERPL-MCNC: 8.9 MCG/ML (ref 0–30)
APTT PPP: 26.1 SECONDS (ref 26.5–34.5)
ARTERIAL PATENCY WRIST A: POSITIVE
AST SERPL-CCNC: 17 U/L (ref 1–32)
ATMOSPHERIC PRESS: 726 MMHG
BACTERIA UR QL AUTO: NORMAL /HPF
BARBITURATES UR QL SCN: NEGATIVE
BASE EXCESS BLDA CALC-SCNC: 2.4 MMOL/L (ref 0–2)
BASOPHILS # BLD AUTO: 0.04 10*3/MM3 (ref 0–0.2)
BASOPHILS NFR BLD AUTO: 0.9 % (ref 0–1.5)
BDY SITE: ABNORMAL
BENZODIAZ UR QL SCN: POSITIVE
BILIRUB SERPL-MCNC: <0.2 MG/DL (ref 0–1.2)
BILIRUB UR QL STRIP: NEGATIVE
BUN SERPL-MCNC: 38 MG/DL (ref 8–23)
BUN SERPL-MCNC: 41 MG/DL (ref 8–23)
BUN/CREAT SERPL: 6.2 (ref 7–25)
BUN/CREAT SERPL: 6.9 (ref 7–25)
BUPRENORPHINE SERPL-MCNC: NEGATIVE NG/ML
CALCIUM SPEC-SCNC: 7 MG/DL (ref 8.6–10.5)
CALCIUM SPEC-SCNC: 7.9 MG/DL (ref 8.6–10.5)
CANNABINOIDS SERPL QL: NEGATIVE
CHLORIDE SERPL-SCNC: 103 MMOL/L (ref 98–107)
CHLORIDE SERPL-SCNC: 104 MMOL/L (ref 98–107)
CLARITY UR: CLEAR
CO2 BLDA-SCNC: 29.5 MMOL/L (ref 22–33)
CO2 SERPL-SCNC: 24.4 MMOL/L (ref 22–29)
CO2 SERPL-SCNC: 24.5 MMOL/L (ref 22–29)
COCAINE UR QL: NEGATIVE
COHGB MFR BLD: 1.4 % (ref 0–5)
COLOR UR: YELLOW
CREAT SERPL-MCNC: 5.91 MG/DL (ref 0.57–1)
CREAT SERPL-MCNC: 6.09 MG/DL (ref 0.57–1)
CRP SERPL-MCNC: <0.3 MG/DL (ref 0–0.5)
D-LACTATE SERPL-SCNC: 1.3 MMOL/L (ref 0.5–2)
DEPRECATED RDW RBC AUTO: 48.2 FL (ref 37–54)
EGFRCR SERPLBLD CKD-EPI 2021: 7.1 ML/MIN/1.73
EGFRCR SERPLBLD CKD-EPI 2021: 7.4 ML/MIN/1.73
EOSINOPHIL # BLD AUTO: 0.21 10*3/MM3 (ref 0–0.4)
EOSINOPHIL NFR BLD AUTO: 5 % (ref 0.3–6.2)
ERYTHROCYTE [DISTWIDTH] IN BLOOD BY AUTOMATED COUNT: 13.1 % (ref 12.3–15.4)
ERYTHROCYTE [SEDIMENTATION RATE] IN BLOOD: 9 MM/HR (ref 0–30)
ETHANOL BLD-MCNC: <10 MG/DL (ref 0–10)
ETHANOL UR QL: <0.01 %
FENTANYL UR-MCNC: NEGATIVE NG/ML
GAS FLOW AIRWAY: 3 LPM
GEN 5 2HR TROPONIN T REFLEX: 198 NG/L
GLOBULIN UR ELPH-MCNC: 2.1 GM/DL
GLUCOSE BLDC GLUCOMTR-MCNC: 118 MG/DL (ref 70–130)
GLUCOSE BLDC GLUCOMTR-MCNC: 156 MG/DL (ref 70–130)
GLUCOSE SERPL-MCNC: 135 MG/DL (ref 65–99)
GLUCOSE SERPL-MCNC: 145 MG/DL (ref 65–99)
GLUCOSE UR STRIP-MCNC: ABNORMAL MG/DL
HCO3 BLDA-SCNC: 28 MMOL/L (ref 20–26)
HCT VFR BLD AUTO: 24.2 % (ref 34–46.6)
HCT VFR BLD CALC: 25.9 % (ref 38–51)
HGB BLD-MCNC: 7.6 G/DL (ref 12–15.9)
HGB BLDA-MCNC: 8.4 G/DL (ref 13.5–17.5)
HGB UR QL STRIP.AUTO: NEGATIVE
HOLD SPECIMEN: NORMAL
HOLD SPECIMEN: NORMAL
HYALINE CASTS UR QL AUTO: NORMAL /LPF
IMM GRANULOCYTES # BLD AUTO: 0.01 10*3/MM3 (ref 0–0.05)
IMM GRANULOCYTES NFR BLD AUTO: 0.2 % (ref 0–0.5)
INHALED O2 CONCENTRATION: 32 %
INR PPP: 0.93 (ref 0.9–1.1)
KETONES UR QL STRIP: NEGATIVE
LEUKOCYTE ESTERASE UR QL STRIP.AUTO: NEGATIVE
LYMPHOCYTES # BLD AUTO: 2.03 10*3/MM3 (ref 0.7–3.1)
LYMPHOCYTES NFR BLD AUTO: 48 % (ref 19.6–45.3)
Lab: ABNORMAL
MAGNESIUM SERPL-MCNC: 2 MG/DL (ref 1.6–2.4)
MCH RBC QN AUTO: 31.7 PG (ref 26.6–33)
MCHC RBC AUTO-ENTMCNC: 31.4 G/DL (ref 31.5–35.7)
MCV RBC AUTO: 100.8 FL (ref 79–97)
METHADONE UR QL SCN: NEGATIVE
METHGB BLD QL: 0.8 % (ref 0–3)
MODALITY: ABNORMAL
MONOCYTES # BLD AUTO: 0.26 10*3/MM3 (ref 0.1–0.9)
MONOCYTES NFR BLD AUTO: 6.1 % (ref 5–12)
NEUTROPHILS NFR BLD AUTO: 1.68 10*3/MM3 (ref 1.7–7)
NEUTROPHILS NFR BLD AUTO: 39.8 % (ref 42.7–76)
NITRITE UR QL STRIP: NEGATIVE
NRBC BLD AUTO-RTO: 0 /100 WBC (ref 0–0.2)
OPIATES UR QL: POSITIVE
OXYCODONE UR QL SCN: POSITIVE
OXYHGB MFR BLDV: 97.8 % (ref 94–99)
PCO2 BLDA: 48.2 MM HG (ref 35–45)
PCO2 TEMP ADJ BLD: ABNORMAL MM[HG]
PCP UR QL SCN: NEGATIVE
PH BLDA: 7.37 PH UNITS (ref 7.35–7.45)
PH UR STRIP.AUTO: 6 [PH] (ref 5–8)
PH, TEMP CORRECTED: ABNORMAL
PHOSPHATE SERPL-MCNC: 6.8 MG/DL (ref 2.5–4.5)
PLATELET # BLD AUTO: 86 10*3/MM3 (ref 140–450)
PMV BLD AUTO: 9.7 FL (ref 6–12)
PO2 BLDA: 154 MM HG (ref 83–108)
PO2 TEMP ADJ BLD: ABNORMAL MM[HG]
POTASSIUM SERPL-SCNC: 5.1 MMOL/L (ref 3.5–5.2)
POTASSIUM SERPL-SCNC: 5.9 MMOL/L (ref 3.5–5.2)
POTASSIUM SERPL-SCNC: 6.1 MMOL/L (ref 3.5–5.2)
PROT SERPL-MCNC: 4.6 G/DL (ref 6–8.5)
PROT UR QL STRIP: ABNORMAL
PROTHROMBIN TIME: 12.9 SECONDS (ref 12.1–14.7)
QT INTERVAL: 480 MS
QTC INTERVAL: 483 MS
RBC # BLD AUTO: 2.4 10*6/MM3 (ref 3.77–5.28)
RBC # UR STRIP: NORMAL /HPF
REF LAB TEST METHOD: NORMAL
SALICYLATES SERPL-MCNC: <0.3 MG/DL
SAO2 % BLDCOA: >99.2 % (ref 94–99)
SODIUM SERPL-SCNC: 136 MMOL/L (ref 136–145)
SODIUM SERPL-SCNC: 140 MMOL/L (ref 136–145)
SP GR UR STRIP: 1.01 (ref 1–1.03)
SQUAMOUS #/AREA URNS HPF: NORMAL /HPF
T4 FREE SERPL-MCNC: 0.3 NG/DL (ref 0.93–1.7)
TRICYCLICS UR QL SCN: NEGATIVE
TROPONIN T DELTA: -16 NG/L
TROPONIN T SERPL HS-MCNC: 214 NG/L
TSH SERPL DL<=0.05 MIU/L-ACNC: 261.1 UIU/ML (ref 0.27–4.2)
UROBILINOGEN UR QL STRIP: ABNORMAL
VENTILATOR MODE: ABNORMAL
WBC # UR STRIP: NORMAL /HPF
WBC NRBC COR # BLD AUTO: 4.23 10*3/MM3 (ref 3.4–10.8)
WHOLE BLOOD HOLD COAG: NORMAL
WHOLE BLOOD HOLD SPECIMEN: NORMAL

## 2024-04-15 PROCEDURE — 63710000001 INSULIN REGULAR HUMAN PER 5 UNITS: Performed by: NURSE PRACTITIONER

## 2024-04-15 PROCEDURE — 96375 TX/PRO/DX INJ NEW DRUG ADDON: CPT

## 2024-04-15 PROCEDURE — 80307 DRUG TEST PRSMV CHEM ANLYZR: CPT | Performed by: PHYSICIAN ASSISTANT

## 2024-04-15 PROCEDURE — 25010000002 NALOXONE PER 1 MG: Performed by: PHYSICIAN ASSISTANT

## 2024-04-15 PROCEDURE — 73030 X-RAY EXAM OF SHOULDER: CPT

## 2024-04-15 PROCEDURE — 83605 ASSAY OF LACTIC ACID: CPT | Performed by: PHYSICIAN ASSISTANT

## 2024-04-15 PROCEDURE — 87147 CULTURE TYPE IMMUNOLOGIC: CPT | Performed by: PHYSICIAN ASSISTANT

## 2024-04-15 PROCEDURE — 85730 THROMBOPLASTIN TIME PARTIAL: CPT | Performed by: PHYSICIAN ASSISTANT

## 2024-04-15 PROCEDURE — 85025 COMPLETE CBC W/AUTO DIFF WBC: CPT | Performed by: PHYSICIAN ASSISTANT

## 2024-04-15 PROCEDURE — 73060 X-RAY EXAM OF HUMERUS: CPT | Performed by: RADIOLOGY

## 2024-04-15 PROCEDURE — 84443 ASSAY THYROID STIM HORMONE: CPT | Performed by: PHYSICIAN ASSISTANT

## 2024-04-15 PROCEDURE — 94640 AIRWAY INHALATION TREATMENT: CPT

## 2024-04-15 PROCEDURE — 25010000002 CALCIUM GLUCONATE-NACL 1-0.675 GM/50ML-% SOLUTION: Performed by: NURSE PRACTITIONER

## 2024-04-15 PROCEDURE — 82948 REAGENT STRIP/BLOOD GLUCOSE: CPT

## 2024-04-15 PROCEDURE — 70450 CT HEAD/BRAIN W/O DYE: CPT | Performed by: RADIOLOGY

## 2024-04-15 PROCEDURE — 96374 THER/PROPH/DIAG INJ IV PUSH: CPT

## 2024-04-15 PROCEDURE — 86140 C-REACTIVE PROTEIN: CPT | Performed by: PHYSICIAN ASSISTANT

## 2024-04-15 PROCEDURE — 82805 BLOOD GASES W/O2 SATURATION: CPT

## 2024-04-15 PROCEDURE — 87040 BLOOD CULTURE FOR BACTERIA: CPT | Performed by: PHYSICIAN ASSISTANT

## 2024-04-15 PROCEDURE — 72125 CT NECK SPINE W/O DYE: CPT

## 2024-04-15 PROCEDURE — 83735 ASSAY OF MAGNESIUM: CPT | Performed by: PHYSICIAN ASSISTANT

## 2024-04-15 PROCEDURE — 87154 CUL TYP ID BLD PTHGN 6+ TRGT: CPT | Performed by: PHYSICIAN ASSISTANT

## 2024-04-15 PROCEDURE — 84132 ASSAY OF SERUM POTASSIUM: CPT | Performed by: PHYSICIAN ASSISTANT

## 2024-04-15 PROCEDURE — 93005 ELECTROCARDIOGRAM TRACING: CPT | Performed by: PHYSICIAN ASSISTANT

## 2024-04-15 PROCEDURE — 80053 COMPREHEN METABOLIC PANEL: CPT | Performed by: PHYSICIAN ASSISTANT

## 2024-04-15 PROCEDURE — 72125 CT NECK SPINE W/O DYE: CPT | Performed by: RADIOLOGY

## 2024-04-15 PROCEDURE — 80179 DRUG ASSAY SALICYLATE: CPT | Performed by: PHYSICIAN ASSISTANT

## 2024-04-15 PROCEDURE — 84481 FREE ASSAY (FT-3): CPT | Performed by: PHYSICIAN ASSISTANT

## 2024-04-15 PROCEDURE — 84100 ASSAY OF PHOSPHORUS: CPT | Performed by: PHYSICIAN ASSISTANT

## 2024-04-15 PROCEDURE — 99284 EMERGENCY DEPT VISIT MOD MDM: CPT

## 2024-04-15 PROCEDURE — 84484 ASSAY OF TROPONIN QUANT: CPT | Performed by: PHYSICIAN ASSISTANT

## 2024-04-15 PROCEDURE — 85652 RBC SED RATE AUTOMATED: CPT | Performed by: PHYSICIAN ASSISTANT

## 2024-04-15 PROCEDURE — 82375 ASSAY CARBOXYHB QUANT: CPT

## 2024-04-15 PROCEDURE — 70450 CT HEAD/BRAIN W/O DYE: CPT

## 2024-04-15 PROCEDURE — 84439 ASSAY OF FREE THYROXINE: CPT | Performed by: PHYSICIAN ASSISTANT

## 2024-04-15 PROCEDURE — 85610 PROTHROMBIN TIME: CPT | Performed by: PHYSICIAN ASSISTANT

## 2024-04-15 PROCEDURE — 81001 URINALYSIS AUTO W/SCOPE: CPT | Performed by: PHYSICIAN ASSISTANT

## 2024-04-15 PROCEDURE — 94799 UNLISTED PULMONARY SVC/PX: CPT

## 2024-04-15 PROCEDURE — 82140 ASSAY OF AMMONIA: CPT | Performed by: PHYSICIAN ASSISTANT

## 2024-04-15 PROCEDURE — 36600 WITHDRAWAL OF ARTERIAL BLOOD: CPT

## 2024-04-15 PROCEDURE — 73060 X-RAY EXAM OF HUMERUS: CPT

## 2024-04-15 PROCEDURE — 73030 X-RAY EXAM OF SHOULDER: CPT | Performed by: RADIOLOGY

## 2024-04-15 PROCEDURE — 71045 X-RAY EXAM CHEST 1 VIEW: CPT

## 2024-04-15 PROCEDURE — 36415 COLL VENOUS BLD VENIPUNCTURE: CPT

## 2024-04-15 PROCEDURE — 83050 HGB METHEMOGLOBIN QUAN: CPT

## 2024-04-15 PROCEDURE — 82077 ASSAY SPEC XCP UR&BREATH IA: CPT | Performed by: PHYSICIAN ASSISTANT

## 2024-04-15 PROCEDURE — 93010 ELECTROCARDIOGRAM REPORT: CPT | Performed by: INTERNAL MEDICINE

## 2024-04-15 PROCEDURE — 71045 X-RAY EXAM CHEST 1 VIEW: CPT | Performed by: RADIOLOGY

## 2024-04-15 PROCEDURE — 80143 DRUG ASSAY ACETAMINOPHEN: CPT | Performed by: PHYSICIAN ASSISTANT

## 2024-04-15 RX ORDER — LEVOTHYROXINE SODIUM 0.05 MG/1
50 TABLET ORAL ONCE
Status: COMPLETED | OUTPATIENT
Start: 2024-04-15 | End: 2024-04-15

## 2024-04-15 RX ORDER — DEXTROSE MONOHYDRATE 25 G/50ML
25 INJECTION, SOLUTION INTRAVENOUS ONCE
Status: COMPLETED | OUTPATIENT
Start: 2024-04-15 | End: 2024-04-15

## 2024-04-15 RX ORDER — SODIUM CHLORIDE 0.9 % (FLUSH) 0.9 %
10 SYRINGE (ML) INJECTION AS NEEDED
Status: DISCONTINUED | OUTPATIENT
Start: 2024-04-15 | End: 2024-04-16 | Stop reason: HOSPADM

## 2024-04-15 RX ORDER — CALCIUM GLUCONATE 20 MG/ML
1000 INJECTION, SOLUTION INTRAVENOUS ONCE
Status: COMPLETED | OUTPATIENT
Start: 2024-04-15 | End: 2024-04-15

## 2024-04-15 RX ORDER — LACTULOSE 10 G/15ML
30 SOLUTION ORAL ONCE
Status: COMPLETED | OUTPATIENT
Start: 2024-04-15 | End: 2024-04-15

## 2024-04-15 RX ORDER — NALOXONE HCL 0.4 MG/ML
0.4 VIAL (ML) INJECTION ONCE
Status: COMPLETED | OUTPATIENT
Start: 2024-04-15 | End: 2024-04-15

## 2024-04-15 RX ORDER — ALBUTEROL SULFATE 2.5 MG/3ML
10 SOLUTION RESPIRATORY (INHALATION) ONCE
Status: COMPLETED | OUTPATIENT
Start: 2024-04-15 | End: 2024-04-15

## 2024-04-15 RX ORDER — SODIUM POLYSTYRENE SULFONATE 4.1 MEQ/G
30 POWDER, FOR SUSPENSION ORAL; RECTAL ONCE
Status: COMPLETED | OUTPATIENT
Start: 2024-04-15 | End: 2024-04-15

## 2024-04-15 RX ADMIN — NALOXONE HYDROCHLORIDE 0.4 MG: 0.4 INJECTION, SOLUTION INTRAMUSCULAR; INTRAVENOUS; SUBCUTANEOUS at 14:50

## 2024-04-15 RX ADMIN — LACTULOSE 20 G: 20 SOLUTION ORAL at 16:49

## 2024-04-15 RX ADMIN — LEVOTHYROXINE SODIUM 50 MCG: 50 TABLET ORAL at 16:49

## 2024-04-15 RX ADMIN — INSULIN HUMAN 5 UNITS: 100 INJECTION, SOLUTION PARENTERAL at 20:30

## 2024-04-15 RX ADMIN — DEXTROSE MONOHYDRATE 25 G: 25 INJECTION, SOLUTION INTRAVENOUS at 20:30

## 2024-04-15 RX ADMIN — ALBUTEROL SULFATE 10 MG: 2.5 SOLUTION RESPIRATORY (INHALATION) at 20:35

## 2024-04-15 RX ADMIN — CALCIUM GLUCONATE 1000 MG: 20 INJECTION, SOLUTION INTRAVENOUS at 20:30

## 2024-04-15 RX ADMIN — SODIUM POLYSTYRENE SULFONATE 15 G: 1 POWDER ORAL; RECTAL at 16:49

## 2024-04-15 NOTE — ED PROVIDER NOTES
Subjective   History of Present Illness  64-year-old female presents secondary to low blood pressure along with excessive drowsiness.  Patient was sent from the dialysis clinic.  Patient is on peritoneal dialysis.  She states that she does this every evening however she did not do it last evening because she did not feel well.  Patient states that she fell last Monday and injured the right side of her neck, right shoulder.  She states that Thursday she started having severe fatigue and has not been able to rest.  Patient is excessively somnolent with pinpoint pupils here in the emergency department.  He is dozing off frequently.  She has a past medical history of renal failure, peripheral artery disease, diabetes, hyperlipidemia, hypertension, cholesterol problems, congestive heart failure and depression.  She presents by ambulance.  She denies any chest pain pressure tightness or squeezing.  She denies any shortness of breath.  She denies any nausea vomiting or diarrhea.        Review of Systems   Constitutional:  Positive for fatigue. Negative for fever.   HENT: Negative.     Respiratory: Negative.     Cardiovascular: Negative.  Negative for chest pain.   Gastrointestinal: Negative.  Negative for abdominal pain.   Endocrine: Negative.    Genitourinary: Negative.  Negative for dysuria.   Skin: Negative.    Neurological: Negative.    Psychiatric/Behavioral: Negative.     All other systems reviewed and are negative.      Past Medical History:   Diagnosis Date    Arthritis     Closed fracture of right fibula and tibia 12/25/2018    Depression     Diabetic ulcer of left foot associated with type 2 diabetes mellitus 6/23/2017    Diastolic dysfunction     Disease of thyroid gland     Elevated cholesterol     End stage renal disease     Essential hypertension     GERD (gastroesophageal reflux disease)     History of transfusion     Hyperlipidemia     Iron deficiency anemia 12/30/2018    PAD (peripheral artery disease)      Renal failure     Type 2 diabetes mellitus with hyperglycemia, with long-term current use of insulin 2018       Allergies   Allergen Reactions    Penicillins Hives and GI Intolerance     Patient has received cefazolin, ceftriaxone and cefepime during past admissions.    Codeine Hives, Rash and GI Intolerance     Pt tolerates [place] @ home    Sulfa Antibiotics Hives, Rash and GI Intolerance     NAUSEA TOO       Past Surgical History:   Procedure Laterality Date    ABDOMINAL SURGERY      BACK SURGERY      Post spinal diskectomy, osteophytectomy in one lumbar interspace    CATARACT EXTRACTION      Left      SECTION      DENTAL PROCEDURE      ENDOSCOPY      FRACTURE SURGERY      right leg    HYSTERECTOMY      INCISION AND DRAINAGE LEG Left 4/15/2017    Procedure: INCISION AND DRAINAGE LOWER EXTREMITY;  Surgeon: Basilio Morris MD;  Location: [facility] OR;  Service:     INCISION AND DRAINAGE LEG Left 2017    Procedure: Irrigation and Debridment abcess diabetic wound left foot with deep culture;  Surgeon: Basilio Morris MD;  Location: [facility] OR;  Service:     INSERTION PERITONEAL DIALYSIS CATHETER N/A 2021    Procedure: INSERTION PERITONEAL DIALYSIS CATHETER LAPAROSCOPIC;  Surgeon: Edy Glover MD;  Location: [facility] OR;  Service: General;  Laterality: N/A;    KNEE ARTHROSCOPY Right     ORIF TIBIA FRACTURE Right 2018    Procedure: TIBIAL  FRACTURE OPEN REDUCTION INTERNAL FIXATION;  Surgeon: Jung Barragan MD;  Location: [facility] OR;  Service: Orthopedics    TOE AMPUTATION Right     5th    TUBAL ABDOMINAL LIGATION         Family History   Problem Relation Age of Onset    Heart disease Mother     Cancer Mother     COPD Mother     Diabetes Other     Depression Other        Social History     Socioeconomic History    Marital status:    Tobacco Use    Smoking status: Never    Smokeless tobacco: Never   Vaping Use    Vaping status: Never Used   Substance and Sexual Activity    Alcohol use:  No    Drug use: No    Sexual activity: Defer           Objective   Physical Exam  Vitals and nursing note reviewed.   Constitutional:       General: She is not in acute distress.     Appearance: She is well-developed. She is not diaphoretic.      Comments: Very somnolent though awakens and answers questions appropriately.   HENT:      Head: Normocephalic and atraumatic.      Right Ear: External ear normal.      Left Ear: External ear normal.      Nose: Nose normal.   Eyes:      Conjunctiva/sclera: Conjunctivae normal.      Pupils: Pupils are equal, round, and reactive to light.   Neck:      Vascular: No JVD.      Trachea: No tracheal deviation.   Cardiovascular:      Rate and Rhythm: Normal rate and regular rhythm.      Heart sounds: Normal heart sounds. No murmur heard.  Pulmonary:      Effort: Pulmonary effort is normal. No respiratory distress.      Breath sounds: Normal breath sounds. No wheezing.   Abdominal:      General: Bowel sounds are normal.      Palpations: Abdomen is soft.      Tenderness: There is no abdominal tenderness.   Musculoskeletal:         General: No deformity. Normal range of motion.      Cervical back: Normal range of motion and neck supple.   Skin:     General: Skin is warm and dry.      Coloration: Skin is not pale.      Findings: No erythema or rash.   Neurological:      Mental Status: She is alert and oriented to person, place, and time.      Cranial Nerves: No cranial nerve deficit.   Psychiatric:         Behavior: Behavior normal.         Thought Content: Thought content normal.         Procedures     Electronically signed by ESSENCE Matthew, 04/15/24, 8:02 PM EDT.    Results for orders placed or performed during the hospital encounter of 04/15/24   Comprehensive Metabolic Panel    Specimen: Arm, Left; Blood   Result Value Ref Range    Glucose 145 (H) 65 - 99 mg/dL    BUN 38 (H) 8 - 23 mg/dL    Creatinine 6.09 (H) 0.57 - 1.00 mg/dL    Sodium 136 136 - 145 mmol/L    Potassium 5.9  (H) 3.5 - 5.2 mmol/L    Chloride 103 98 - 107 mmol/L    CO2 24.5 22.0 - 29.0 mmol/L    Calcium 7.0 (L) 8.6 - 10.5 mg/dL    Total Protein 4.6 (L) 6.0 - 8.5 g/dL    Albumin 2.5 (L) 3.5 - 5.2 g/dL    ALT (SGPT) 11 1 - 33 U/L    AST (SGOT) 17 1 - 32 U/L    Alkaline Phosphatase 54 39 - 117 U/L    Total Bilirubin <0.2 0.0 - 1.2 mg/dL    Globulin 2.1 gm/dL    A/G Ratio 1.2 g/dL    BUN/Creatinine Ratio 6.2 (L) 7.0 - 25.0    Anion Gap 8.5 5.0 - 15.0 mmol/L    eGFR 7.1 (L) >60.0 mL/min/1.73   aPTT    Specimen: Arm, Left; Blood   Result Value Ref Range    PTT 26.1 (L) 26.5 - 34.5 seconds   Protime-INR    Specimen: Arm, Left; Blood   Result Value Ref Range    Protime 12.9 12.1 - 14.7 Seconds    INR 0.93 0.90 - 1.10   Urinalysis With Culture If Indicated - Urine, Clean Catch    Specimen: Urine, Clean Catch   Result Value Ref Range    Color, UA Yellow Yellow, Straw    Appearance, UA Clear Clear    pH, UA 6.0 5.0 - 8.0    Specific Gravity, UA 1.012 1.005 - 1.030    Glucose,  mg/dL (2+) (A) Negative    Ketones, UA Negative Negative    Bilirubin, UA Negative Negative    Blood, UA Negative Negative    Protein,  mg/dL (2+) (A) Negative    Leuk Esterase, UA Negative Negative    Nitrite, UA Negative Negative    Urobilinogen, UA 0.2 E.U./dL 0.2 - 1.0 E.U./dL   High Sensitivity Troponin T    Specimen: Arm, Left; Blood   Result Value Ref Range    HS Troponin T 214 (C) <14 ng/L   Lactic Acid, Plasma    Specimen: Arm, Left; Blood   Result Value Ref Range    Lactate 1.3 0.5 - 2.0 mmol/L   C-reactive Protein    Specimen: Arm, Left; Blood   Result Value Ref Range    C-Reactive Protein <0.30 0.00 - 0.50 mg/dL   Sedimentation Rate    Specimen: Arm, Left; Blood   Result Value Ref Range    Sed Rate 9 0 - 30 mm/hr   Magnesium    Specimen: Arm, Left; Blood   Result Value Ref Range    Magnesium 2.0 1.6 - 2.4 mg/dL   TSH    Specimen: Arm, Left; Blood   Result Value Ref Range    .100 (H) 0.270 - 4.200 uIU/mL   Phosphorus    Specimen:  Arm, Left; Blood   Result Value Ref Range    Phosphorus 6.8 (H) 2.5 - 4.5 mg/dL   CBC Auto Differential    Specimen: Arm, Left; Blood   Result Value Ref Range    WBC 4.23 3.40 - 10.80 10*3/mm3    RBC 2.40 (L) 3.77 - 5.28 10*6/mm3    Hemoglobin 7.6 (L) 12.0 - 15.9 g/dL    Hematocrit 24.2 (L) 34.0 - 46.6 %    .8 (H) 79.0 - 97.0 fL    MCH 31.7 26.6 - 33.0 pg    MCHC 31.4 (L) 31.5 - 35.7 g/dL    RDW 13.1 12.3 - 15.4 %    RDW-SD 48.2 37.0 - 54.0 fl    MPV 9.7 6.0 - 12.0 fL    Platelets 86 (L) 140 - 450 10*3/mm3    Neutrophil % 39.8 (L) 42.7 - 76.0 %    Lymphocyte % 48.0 (H) 19.6 - 45.3 %    Monocyte % 6.1 5.0 - 12.0 %    Eosinophil % 5.0 0.3 - 6.2 %    Basophil % 0.9 0.0 - 1.5 %    Immature Grans % 0.2 0.0 - 0.5 %    Neutrophils, Absolute 1.68 (L) 1.70 - 7.00 10*3/mm3    Lymphocytes, Absolute 2.03 0.70 - 3.10 10*3/mm3    Monocytes, Absolute 0.26 0.10 - 0.90 10*3/mm3    Eosinophils, Absolute 0.21 0.00 - 0.40 10*3/mm3    Basophils, Absolute 0.04 0.00 - 0.20 10*3/mm3    Immature Grans, Absolute 0.01 0.00 - 0.05 10*3/mm3    nRBC 0.0 0.0 - 0.2 /100 WBC   Acetaminophen Level    Specimen: Arm, Left; Blood   Result Value Ref Range    Acetaminophen 8.9 0.0 - 30.0 mcg/mL   Ethanol    Specimen: Arm, Left; Blood   Result Value Ref Range    Ethanol <10 0 - 10 mg/dL    Ethanol % <0.010 %   Urine Drug Screen - Urine, Clean Catch    Specimen: Urine, Clean Catch   Result Value Ref Range    THC, Screen, Urine Negative Negative    Phencyclidine (PCP), Urine Negative Negative    Cocaine Screen, Urine Negative Negative    Methamphetamine, Ur Negative Negative    Opiate Screen Positive (A) Negative    Amphetamine Screen, Urine Negative Negative    Benzodiazepine Screen, Urine Positive (A) Negative    Tricyclic Antidepressants Screen Negative Negative    Methadone Screen, Urine Negative Negative    Barbiturates Screen, Urine Negative Negative    Oxycodone Screen, Urine Positive (A) Negative    Buprenorphine, Screen, Urine Negative  Negative   Salicylate Level    Specimen: Arm, Left; Blood   Result Value Ref Range    Salicylate <0.3 <=30.0 mg/dL   Ammonia    Specimen: Arm, Left; Blood   Result Value Ref Range    Ammonia 30 11 - 51 umol/L   Blood Gas, Arterial With Co-Ox    Specimen: Arterial Blood   Result Value Ref Range    Site Left Radial     Óscar's Test Positive     pH, Arterial 7.373 7.350 - 7.450 pH units    pCO2, Arterial 48.2 (H) 35.0 - 45.0 mm Hg    pO2, Arterial 154.0 (H) 83.0 - 108.0 mm Hg    HCO3, Arterial 28.0 (H) 20.0 - 26.0 mmol/L    Base Excess, Arterial 2.4 (H) 0.0 - 2.0 mmol/L    O2 Saturation, Arterial >99.2 (H) 94.0 - 99.0 %    Hemoglobin, Blood Gas 8.4 (L) 13.5 - 17.5 g/dL    Hematocrit, Blood Gas 25.9 (L) 38.0 - 51.0 %    Oxyhemoglobin 97.8 94 - 99 %    Methemoglobin 0.80 0.00 - 3.00 %    Carboxyhemoglobin 1.4 0 - 5 %    A-a DO2 9.9 0.0 - 300.0 mmHg    CO2 Content 29.5 22 - 33 mmol/L    Barometric Pressure for Blood Gas 726 mmHg    Modality Nasal Cannula     FIO2 32 %    Flow Rate 3.0 lpm    Ventilator Mode NA     Collected by [id number]     pH, Temp Corrected      pCO2, Temperature Corrected      pO2, Temperature Corrected     High Sensitivity Troponin T 2Hr    Specimen: Hand, Right; Blood   Result Value Ref Range    HS Troponin T 198 (C) <14 ng/L    Troponin T Delta -16 (L) >=-4 - <+4 ng/L   T4, Free    Specimen: Arm, Left; Blood   Result Value Ref Range    Free T4 0.30 (L) 0.93 - 1.70 ng/dL   Fentanyl, Urine - Urine, Clean Catch    Specimen: Urine, Clean Catch   Result Value Ref Range    Fentanyl, Urine Negative Negative   Urinalysis, Microscopic Only - Urine, Clean Catch    Specimen: Urine, Clean Catch   Result Value Ref Range    RBC, UA 0-2 None Seen, 0-2 /HPF    WBC, UA None Seen None Seen, 0-2 /HPF    Bacteria, UA None Seen None Seen /HPF    Squamous Epithelial Cells, UA None Seen None Seen, 0-2 /HPF    Hyaline Casts, UA None Seen None Seen /LPF    Methodology Automated Microscopy    Potassium    Specimen: Arm, Left;  Blood   Result Value Ref Range    Potassium 6.1 (C) 3.5 - 5.2 mmol/L   Basic Metabolic Panel    Specimen: Arm, Left; Blood   Result Value Ref Range    Glucose 135 (H) 65 - 99 mg/dL    BUN 41 (H) 8 - 23 mg/dL    Creatinine 5.91 (H) 0.57 - 1.00 mg/dL    Sodium 140 136 - 145 mmol/L    Potassium 5.1 3.5 - 5.2 mmol/L    Chloride 104 98 - 107 mmol/L    CO2 24.4 22.0 - 29.0 mmol/L    Calcium 7.9 (L) 8.6 - 10.5 mg/dL    BUN/Creatinine Ratio 6.9 (L) 7.0 - 25.0    Anion Gap 11.6 5.0 - 15.0 mmol/L    eGFR 7.4 (L) >60.0 mL/min/1.73   Potassium    Specimen: Hand, Left; Blood   Result Value Ref Range    Potassium 5.3 (H) 3.5 - 5.2 mmol/L   POC Glucose Once    Specimen: Blood   Result Value Ref Range    Glucose 156 (H) 70 - 130 mg/dL   POC Glucose Once    Specimen: Blood   Result Value Ref Range    Glucose 118 70 - 130 mg/dL   ECG 12 Lead Electrolyte Imbalance   Result Value Ref Range    QT Interval 480 ms    QTC Interval 483 ms   Green Top (Gel)   Result Value Ref Range    Extra Tube Hold for add-ons.    Lavender Top   Result Value Ref Range    Extra Tube hold for add-on    Gold Top - SST   Result Value Ref Range    Extra Tube Hold for add-ons.    Light Blue Top   Result Value Ref Range    Extra Tube Hold for add-ons.       CT Cervical Spine Without Contrast   ED Interpretation   Interpretation radiologist      Final Result   No acute fracture.       No acute intracranial process.                                       This report was finalized on 4/15/2024 3:22 PM by Sam Rodas M.D..          CT Head Without Contrast   ED Interpretation   Interpretation radiologist      Final Result   No acute fracture.       No acute intracranial process.                                       This report was finalized on 4/15/2024 3:22 PM by Sam Rodas M.D..          XR Humerus Right   ED Interpretation   Interpretation per radiologist      Final Result   No acute fracture.                       This report was finalized on  "4/15/2024 2:44 PM by Sam Rodas M.D..          XR Shoulder 2+ View Right   ED Interpretation   Interpretation per radiologist      Final Result   No acute fracture.                       This report was finalized on 4/15/2024 2:52 PM by Sam Rodas M.D..          XR Chest 1 View   ED Interpretation   Interpretation per radiologist      Final Result   No acute cardiopulmonary process.           This report was finalized on 4/15/2024 1:51 PM by Sam Rodas M.D..               ED Course  ED Course as of 04/16/24 0032   Mon Apr 15, 2024   1443 Patient upset because she had to have numerous needlesticks.  She would like to see the president of the hospital because she is sleepy and no one \"gives a fuck\".  Patient is somnolent.  Will give her some Narcan to see if this helps her somnolence. [JI]   1518 EKG notes sinus rhythm.  60 bpm.  No acute ST elevation.  QTc 483.  Electronically signed by Ajay Chavez DO, 04/15/24, 3:18 PM EDT.   [SF]   1600 Patient apparently has not taken her Synthroid in greater than a month according to her.  We will touch base with nephrology.  She is much more awake and alert after Narcan.  She states that she also took a hydroxyzine prior to arrival.  Her only complaint at this time is that she is very cold. [JI]   1602 Reviewed with Dr. Arroyo he recommends 30 g of Kayexalate and 30 g of lactulose.  Repeat potassium afterwards [JI]   1646 Patient being refractory about taking her Kayexalate and lactulose.  She agreed to take 15 g of each.  She was counseled. [JI]   1838 Patient has started drinking the other portion of her Kayexalate and lactulose.  0 results from the first round. [JI]   2151 Endorsed to Shania Bey at shift change. [SM]   2151 Electronically signed by DARIUS May, 04/15/24, 9:51 PM EDT.  [SM]      ED Course User Index  [JI] Suleman Fontaine, PA  [SF] Ajay Chavez DO  [SM] Beverly Galarza, DARIUS                               "               Medical Decision Making  64-year-old female presents secondary to low blood pressure along with excessive drowsiness.  Patient was sent from the dialysis clinic.  Patient is on peritoneal dialysis.  She states that she does this every evening however she did not do it last evening because she did not feel well.  Patient states that she fell last Monday and injured the right side of her neck, right shoulder.  She states that Thursday she started having severe fatigue and has not been able to rest.  Patient is excessively somnolent with pinpoint pupils here in the emergency department.  He is dozing off frequently.  She has a past medical history of renal failure, peripheral artery disease, diabetes, hyperlipidemia, hypertension, cholesterol problems, congestive heart failure and depression.  She presents by ambulance.  She denies any chest pain pressure tightness or squeezing.  She denies any shortness of breath.  She denies any nausea vomiting or diarrhea.    Problems Addressed:  Hyperkalemia: complicated acute illness or injury    Amount and/or Complexity of Data Reviewed  Labs: ordered. Decision-making details documented in ED Course.  Radiology: ordered. Decision-making details documented in ED Course.  ECG/medicine tests: ordered.    Risk  OTC drugs.  Prescription drug management.        Final diagnoses:   Hyperkalemia       ED Disposition  ED Disposition       ED Disposition   Discharge    Condition   Stable    Comment   --               Susan Stephens, APRN  [address]  [place] [id number] [phone]    Call in 2 days           Medication List      No changes were made to your prescriptions during this visit.            Shania Bey, DARIUS  04/16/24 0033

## 2024-04-15 NOTE — ED NOTES
Pt drank 15g kayexalate and 15g lactulose, refuses to drink the rest of the doses. Provider notified.

## 2024-04-15 NOTE — ED NOTES
Pt refusing to take kayexelate or lactulose. She states she can take it home and that is what she prefers to do. Provider notified.

## 2024-04-16 ENCOUNTER — TRANSCRIBE ORDERS (OUTPATIENT)
Dept: ADMINISTRATIVE | Facility: HOSPITAL | Age: 64
End: 2024-04-16
Payer: MEDICARE

## 2024-04-16 VITALS
HEART RATE: 65 BPM | BODY MASS INDEX: 18.84 KG/M2 | TEMPERATURE: 97.6 F | SYSTOLIC BLOOD PRESSURE: 148 MMHG | WEIGHT: 113.1 LBS | HEIGHT: 65 IN | OXYGEN SATURATION: 98 % | RESPIRATION RATE: 18 BRPM | DIASTOLIC BLOOD PRESSURE: 76 MMHG

## 2024-04-16 DIAGNOSIS — S49.91XA INJURY OF RIGHT SHOULDER, INITIAL ENCOUNTER: Primary | ICD-10-CM

## 2024-04-16 LAB
BACTERIA BLD CULT: ABNORMAL
BOTTLE TYPE: ABNORMAL
POTASSIUM SERPL-SCNC: 5.3 MMOL/L (ref 3.5–5.2)
T3FREE SERPL-MCNC: 1.07 PG/ML (ref 2–4.4)

## 2024-04-16 PROCEDURE — 84132 ASSAY OF SERUM POTASSIUM: CPT | Performed by: NURSE PRACTITIONER

## 2024-04-16 NOTE — ED NOTES
Talked to pts boyfriend and he states he will pay for a cab if we get her one to her house. Provider made aware.

## 2024-04-16 NOTE — ED NOTES
Pt in room crying. Pt states she has no way into her house and if her boyfriend isnt answering then she can't get into the house. Pt states she has no one else. Pt provider another number. Tried calling it and there was no answer. Will continue to try phone numbers. [facility] states they cannot take her home if she cannot get into her house. Provider made aware.

## 2024-04-16 NOTE — ED NOTES
"Attempted to call patient's emergency contacts multiple times with no answer. Patient reports \"I cannot get in the house by myself, I have nobody.\" Patient is very tearful. Patient does not meet criteria for ambulance home. Patient reports her wheelchair is at the dialysis center and she has no other way into her home.   "

## 2024-04-16 NOTE — ED NOTES
Pt assisted to restroom by ER tech, pt brief changed at this time. A&Ox4. RR even and unlabored. Call light within reach. Pt reports no needs at this time.

## 2024-04-18 LAB
BACTERIA SPEC AEROBE CULT: ABNORMAL
GRAM STN SPEC: ABNORMAL
ISOLATED FROM: ABNORMAL

## 2024-04-20 LAB — BACTERIA SPEC AEROBE CULT: NORMAL

## 2024-04-28 ENCOUNTER — APPOINTMENT (OUTPATIENT)
Dept: CT IMAGING | Facility: HOSPITAL | Age: 64
End: 2024-04-28
Payer: MEDICARE

## 2024-04-28 ENCOUNTER — HOSPITAL ENCOUNTER (OUTPATIENT)
Facility: HOSPITAL | Age: 64
LOS: 2 days | Discharge: HOME HEALTH | End: 2024-04-30
Attending: EMERGENCY MEDICINE | Admitting: HOSPITALIST
Payer: MEDICARE

## 2024-04-28 DIAGNOSIS — R56.9 SEIZURE: Primary | ICD-10-CM

## 2024-04-28 DIAGNOSIS — N18.6 STAGE 5 CHRONIC KIDNEY DISEASE ON CHRONIC DIALYSIS: ICD-10-CM

## 2024-04-28 DIAGNOSIS — Z99.2 STAGE 5 CHRONIC KIDNEY DISEASE ON CHRONIC DIALYSIS: ICD-10-CM

## 2024-04-28 DIAGNOSIS — R56.9 SEIZURE-LIKE ACTIVITY: ICD-10-CM

## 2024-04-28 DIAGNOSIS — R53.81 DEBILITY: ICD-10-CM

## 2024-04-28 LAB
ALBUMIN SERPL-MCNC: 3.5 G/DL (ref 3.5–5.2)
ALBUMIN/GLOB SERPL: 1.2 G/DL
ALP SERPL-CCNC: 110 U/L (ref 39–117)
ALT SERPL W P-5'-P-CCNC: 18 U/L (ref 1–33)
AMPHET+METHAMPHET UR QL: NEGATIVE
AMPHETAMINES UR QL: NEGATIVE
ANION GAP SERPL CALCULATED.3IONS-SCNC: 16.3 MMOL/L (ref 5–15)
APTT PPP: 25.4 SECONDS (ref 26.5–34.5)
AST SERPL-CCNC: 28 U/L (ref 1–32)
BACTERIA UR QL AUTO: ABNORMAL /HPF
BARBITURATES UR QL SCN: NEGATIVE
BASOPHILS # BLD AUTO: 0.04 10*3/MM3 (ref 0–0.2)
BASOPHILS # BLD AUTO: 0.04 10*3/MM3 (ref 0–0.2)
BASOPHILS NFR BLD AUTO: 0.8 % (ref 0–1.5)
BASOPHILS NFR BLD AUTO: 0.8 % (ref 0–1.5)
BENZODIAZ UR QL SCN: NEGATIVE
BILIRUB SERPL-MCNC: 0.3 MG/DL (ref 0–1.2)
BILIRUB UR QL STRIP: NEGATIVE
BUN SERPL-MCNC: 32 MG/DL (ref 8–23)
BUN/CREAT SERPL: 6.3 (ref 7–25)
BUPRENORPHINE SERPL-MCNC: NEGATIVE NG/ML
CALCIUM SPEC-SCNC: 8.1 MG/DL (ref 8.6–10.5)
CANNABINOIDS SERPL QL: NEGATIVE
CHLORIDE SERPL-SCNC: 100 MMOL/L (ref 98–107)
CK SERPL-CCNC: 104 U/L (ref 20–180)
CLARITY UR: CLEAR
CO2 SERPL-SCNC: 25.7 MMOL/L (ref 22–29)
COCAINE UR QL: NEGATIVE
COLOR UR: YELLOW
CREAT SERPL-MCNC: 5.07 MG/DL (ref 0.57–1)
CRP SERPL-MCNC: <0.3 MG/DL (ref 0–0.5)
D-LACTATE SERPL-SCNC: 1.2 MMOL/L (ref 0.5–2)
DEPRECATED RDW RBC AUTO: 56.2 FL (ref 37–54)
DEPRECATED RDW RBC AUTO: 56.9 FL (ref 37–54)
EGFRCR SERPLBLD CKD-EPI 2021: 8.9 ML/MIN/1.73
EOSINOPHIL # BLD AUTO: 0.03 10*3/MM3 (ref 0–0.4)
EOSINOPHIL # BLD AUTO: 0.09 10*3/MM3 (ref 0–0.4)
EOSINOPHIL NFR BLD AUTO: 0.6 % (ref 0.3–6.2)
EOSINOPHIL NFR BLD AUTO: 1.7 % (ref 0.3–6.2)
ERYTHROCYTE [DISTWIDTH] IN BLOOD BY AUTOMATED COUNT: 15.8 % (ref 12.3–15.4)
ERYTHROCYTE [DISTWIDTH] IN BLOOD BY AUTOMATED COUNT: 15.8 % (ref 12.3–15.4)
ERYTHROCYTE [SEDIMENTATION RATE] IN BLOOD: 22 MM/HR (ref 0–30)
ETHANOL BLD-MCNC: <10 MG/DL (ref 0–10)
ETHANOL UR QL: <0.01 %
FENTANYL UR-MCNC: NEGATIVE NG/ML
FLUAV RNA RESP QL NAA+PROBE: NOT DETECTED
FLUBV RNA RESP QL NAA+PROBE: NOT DETECTED
GEN 5 2HR TROPONIN T REFLEX: 131 NG/L
GLOBULIN UR ELPH-MCNC: 2.9 GM/DL
GLUCOSE BLDC GLUCOMTR-MCNC: 159 MG/DL (ref 70–130)
GLUCOSE BLDC GLUCOMTR-MCNC: 170 MG/DL (ref 70–130)
GLUCOSE SERPL-MCNC: 181 MG/DL (ref 65–99)
GLUCOSE UR STRIP-MCNC: ABNORMAL MG/DL
HBA1C MFR BLD: 6.4 % (ref 4.8–5.6)
HCT VFR BLD AUTO: 32.7 % (ref 34–46.6)
HCT VFR BLD AUTO: 37.3 % (ref 34–46.6)
HGB BLD-MCNC: 10.3 G/DL (ref 12–15.9)
HGB BLD-MCNC: 11.5 G/DL (ref 12–15.9)
HGB UR QL STRIP.AUTO: NEGATIVE
HYALINE CASTS UR QL AUTO: ABNORMAL /LPF
IMM GRANULOCYTES # BLD AUTO: 0.01 10*3/MM3 (ref 0–0.05)
IMM GRANULOCYTES # BLD AUTO: 0.02 10*3/MM3 (ref 0–0.05)
IMM GRANULOCYTES NFR BLD AUTO: 0.2 % (ref 0–0.5)
IMM GRANULOCYTES NFR BLD AUTO: 0.4 % (ref 0–0.5)
INR PPP: 0.94 (ref 0.9–1.1)
KETONES UR QL STRIP: NEGATIVE
LEUKOCYTE ESTERASE UR QL STRIP.AUTO: NEGATIVE
LYMPHOCYTES # BLD AUTO: 0.54 10*3/MM3 (ref 0.7–3.1)
LYMPHOCYTES # BLD AUTO: 1.31 10*3/MM3 (ref 0.7–3.1)
LYMPHOCYTES NFR BLD AUTO: 10.1 % (ref 19.6–45.3)
LYMPHOCYTES NFR BLD AUTO: 26.6 % (ref 19.6–45.3)
MAGNESIUM SERPL-MCNC: 1.9 MG/DL (ref 1.6–2.4)
MCH RBC QN AUTO: 30.9 PG (ref 26.6–33)
MCH RBC QN AUTO: 31.5 PG (ref 26.6–33)
MCHC RBC AUTO-ENTMCNC: 30.8 G/DL (ref 31.5–35.7)
MCHC RBC AUTO-ENTMCNC: 31.5 G/DL (ref 31.5–35.7)
MCV RBC AUTO: 100 FL (ref 79–97)
MCV RBC AUTO: 100.3 FL (ref 79–97)
METHADONE UR QL SCN: NEGATIVE
MONOCYTES # BLD AUTO: 0.18 10*3/MM3 (ref 0.1–0.9)
MONOCYTES # BLD AUTO: 0.27 10*3/MM3 (ref 0.1–0.9)
MONOCYTES NFR BLD AUTO: 3.4 % (ref 5–12)
MONOCYTES NFR BLD AUTO: 5.5 % (ref 5–12)
NEUTROPHILS NFR BLD AUTO: 3.26 10*3/MM3 (ref 1.7–7)
NEUTROPHILS NFR BLD AUTO: 4.47 10*3/MM3 (ref 1.7–7)
NEUTROPHILS NFR BLD AUTO: 66.1 % (ref 42.7–76)
NEUTROPHILS NFR BLD AUTO: 83.8 % (ref 42.7–76)
NITRITE UR QL STRIP: NEGATIVE
NRBC BLD AUTO-RTO: 0 /100 WBC (ref 0–0.2)
NRBC BLD AUTO-RTO: 0 /100 WBC (ref 0–0.2)
NT-PROBNP SERPL-MCNC: ABNORMAL PG/ML (ref 0–900)
OPIATES UR QL: POSITIVE
OXYCODONE UR QL SCN: NEGATIVE
PCP UR QL SCN: NEGATIVE
PH UR STRIP.AUTO: 7 [PH] (ref 5–8)
PLATELET # BLD AUTO: 148 10*3/MM3 (ref 140–450)
PLATELET # BLD AUTO: 169 10*3/MM3 (ref 140–450)
PMV BLD AUTO: 10.5 FL (ref 6–12)
PMV BLD AUTO: 9.8 FL (ref 6–12)
POTASSIUM SERPL-SCNC: 4.1 MMOL/L (ref 3.5–5.2)
PROCALCITONIN SERPL-MCNC: 0.45 NG/ML (ref 0–0.25)
PROT SERPL-MCNC: 6.4 G/DL (ref 6–8.5)
PROT UR QL STRIP: ABNORMAL
PROTHROMBIN TIME: 13.1 SECONDS (ref 12.1–14.7)
QT INTERVAL: 440 MS
QTC INTERVAL: 484 MS
RBC # BLD AUTO: 3.27 10*6/MM3 (ref 3.77–5.28)
RBC # BLD AUTO: 3.72 10*6/MM3 (ref 3.77–5.28)
RBC # UR STRIP: ABNORMAL /HPF
REF LAB TEST METHOD: ABNORMAL
SARS-COV-2 RNA RESP QL NAA+PROBE: NOT DETECTED
SODIUM SERPL-SCNC: 142 MMOL/L (ref 136–145)
SP GR UR STRIP: 1.01 (ref 1–1.03)
SQUAMOUS #/AREA URNS HPF: ABNORMAL /HPF
T4 FREE SERPL-MCNC: 0.79 NG/DL (ref 0.93–1.7)
TRICYCLICS UR QL SCN: NEGATIVE
TROPONIN T DELTA: -28 NG/L
TROPONIN T SERPL HS-MCNC: 159 NG/L
TSH SERPL DL<=0.05 MIU/L-ACNC: 142.1 UIU/ML (ref 0.27–4.2)
UROBILINOGEN UR QL STRIP: ABNORMAL
WBC # UR STRIP: ABNORMAL /HPF
WBC NRBC COR # BLD AUTO: 4.93 10*3/MM3 (ref 3.4–10.8)
WBC NRBC COR # BLD AUTO: 5.33 10*3/MM3 (ref 3.4–10.8)

## 2024-04-28 PROCEDURE — 99223 1ST HOSP IP/OBS HIGH 75: CPT | Performed by: HOSPITALIST

## 2024-04-28 PROCEDURE — 85652 RBC SED RATE AUTOMATED: CPT | Performed by: EMERGENCY MEDICINE

## 2024-04-28 PROCEDURE — 82948 REAGENT STRIP/BLOOD GLUCOSE: CPT

## 2024-04-28 PROCEDURE — 99285 EMERGENCY DEPT VISIT HI MDM: CPT

## 2024-04-28 PROCEDURE — 85730 THROMBOPLASTIN TIME PARTIAL: CPT | Performed by: EMERGENCY MEDICINE

## 2024-04-28 PROCEDURE — 83605 ASSAY OF LACTIC ACID: CPT | Performed by: EMERGENCY MEDICINE

## 2024-04-28 PROCEDURE — 93005 ELECTROCARDIOGRAM TRACING: CPT | Performed by: EMERGENCY MEDICINE

## 2024-04-28 PROCEDURE — 74176 CT ABD & PELVIS W/O CONTRAST: CPT | Performed by: RADIOLOGY

## 2024-04-28 PROCEDURE — 84439 ASSAY OF FREE THYROXINE: CPT | Performed by: EMERGENCY MEDICINE

## 2024-04-28 PROCEDURE — 96372 THER/PROPH/DIAG INJ SC/IM: CPT

## 2024-04-28 PROCEDURE — 83880 ASSAY OF NATRIURETIC PEPTIDE: CPT | Performed by: EMERGENCY MEDICINE

## 2024-04-28 PROCEDURE — 96365 THER/PROPH/DIAG IV INF INIT: CPT

## 2024-04-28 PROCEDURE — 25010000002 LEVOFLOXACIN PER 250 MG: Performed by: EMERGENCY MEDICINE

## 2024-04-28 PROCEDURE — 83735 ASSAY OF MAGNESIUM: CPT | Performed by: NURSE PRACTITIONER

## 2024-04-28 PROCEDURE — 70450 CT HEAD/BRAIN W/O DYE: CPT | Performed by: RADIOLOGY

## 2024-04-28 PROCEDURE — 80307 DRUG TEST PRSMV CHEM ANLYZR: CPT | Performed by: EMERGENCY MEDICINE

## 2024-04-28 PROCEDURE — 83735 ASSAY OF MAGNESIUM: CPT | Performed by: EMERGENCY MEDICINE

## 2024-04-28 PROCEDURE — 63710000001 INSULIN LISPRO (HUMAN) PER 5 UNITS: Performed by: HOSPITALIST

## 2024-04-28 PROCEDURE — 83036 HEMOGLOBIN GLYCOSYLATED A1C: CPT | Performed by: HOSPITALIST

## 2024-04-28 PROCEDURE — 82140 ASSAY OF AMMONIA: CPT

## 2024-04-28 PROCEDURE — 85025 COMPLETE CBC W/AUTO DIFF WBC: CPT | Performed by: HOSPITALIST

## 2024-04-28 PROCEDURE — P9612 CATHETERIZE FOR URINE SPEC: HCPCS

## 2024-04-28 PROCEDURE — 25010000002 HEPARIN (PORCINE) PER 1000 UNITS: Performed by: HOSPITALIST

## 2024-04-28 PROCEDURE — 71250 CT THORAX DX C-: CPT | Performed by: RADIOLOGY

## 2024-04-28 PROCEDURE — 84145 PROCALCITONIN (PCT): CPT | Performed by: EMERGENCY MEDICINE

## 2024-04-28 PROCEDURE — 81001 URINALYSIS AUTO W/SCOPE: CPT | Performed by: EMERGENCY MEDICINE

## 2024-04-28 PROCEDURE — 25010000002 LORAZEPAM PER 2 MG

## 2024-04-28 PROCEDURE — 84146 ASSAY OF PROLACTIN: CPT | Performed by: HOSPITALIST

## 2024-04-28 PROCEDURE — 25010000002 LEVETIRACETAM IN NACL 0.75% 1000 MG/100ML SOLUTION: Performed by: EMERGENCY MEDICINE

## 2024-04-28 PROCEDURE — 96367 TX/PROPH/DG ADDL SEQ IV INF: CPT

## 2024-04-28 PROCEDURE — 71250 CT THORAX DX C-: CPT

## 2024-04-28 PROCEDURE — 80053 COMPREHEN METABOLIC PANEL: CPT | Performed by: HOSPITALIST

## 2024-04-28 PROCEDURE — 25810000003 SODIUM CHLORIDE 0.9 % SOLUTION: Performed by: EMERGENCY MEDICINE

## 2024-04-28 PROCEDURE — 86140 C-REACTIVE PROTEIN: CPT | Performed by: EMERGENCY MEDICINE

## 2024-04-28 PROCEDURE — 96375 TX/PRO/DX INJ NEW DRUG ADDON: CPT

## 2024-04-28 PROCEDURE — 80053 COMPREHEN METABOLIC PANEL: CPT | Performed by: EMERGENCY MEDICINE

## 2024-04-28 PROCEDURE — 74176 CT ABD & PELVIS W/O CONTRAST: CPT

## 2024-04-28 PROCEDURE — 25010000002 METRONIDAZOLE 500 MG/100ML SOLUTION: Performed by: EMERGENCY MEDICINE

## 2024-04-28 PROCEDURE — 85610 PROTHROMBIN TIME: CPT | Performed by: EMERGENCY MEDICINE

## 2024-04-28 PROCEDURE — 25010000002 HYDRALAZINE PER 20 MG: Performed by: EMERGENCY MEDICINE

## 2024-04-28 PROCEDURE — 84443 ASSAY THYROID STIM HORMONE: CPT | Performed by: EMERGENCY MEDICINE

## 2024-04-28 PROCEDURE — 82550 ASSAY OF CK (CPK): CPT | Performed by: HOSPITALIST

## 2024-04-28 PROCEDURE — 87636 SARSCOV2 & INF A&B AMP PRB: CPT | Performed by: EMERGENCY MEDICINE

## 2024-04-28 PROCEDURE — 25010000002 ONDANSETRON PER 1 MG: Performed by: EMERGENCY MEDICINE

## 2024-04-28 PROCEDURE — 87040 BLOOD CULTURE FOR BACTERIA: CPT | Performed by: EMERGENCY MEDICINE

## 2024-04-28 PROCEDURE — 82077 ASSAY SPEC XCP UR&BREATH IA: CPT | Performed by: EMERGENCY MEDICINE

## 2024-04-28 PROCEDURE — 93010 ELECTROCARDIOGRAM REPORT: CPT | Performed by: INTERNAL MEDICINE

## 2024-04-28 PROCEDURE — 36415 COLL VENOUS BLD VENIPUNCTURE: CPT | Performed by: HOSPITALIST

## 2024-04-28 PROCEDURE — 70450 CT HEAD/BRAIN W/O DYE: CPT

## 2024-04-28 PROCEDURE — 85025 COMPLETE CBC W/AUTO DIFF WBC: CPT | Performed by: EMERGENCY MEDICINE

## 2024-04-28 PROCEDURE — 84484 ASSAY OF TROPONIN QUANT: CPT | Performed by: EMERGENCY MEDICINE

## 2024-04-28 RX ORDER — POLYETHYLENE GLYCOL 3350 17 G/17G
17 POWDER, FOR SOLUTION ORAL DAILY PRN
Status: DISCONTINUED | OUTPATIENT
Start: 2024-04-28 | End: 2024-04-30 | Stop reason: HOSPADM

## 2024-04-28 RX ORDER — LEVETIRACETAM 10 MG/ML
1000 INJECTION INTRAVASCULAR ONCE
Status: COMPLETED | OUTPATIENT
Start: 2024-04-28 | End: 2024-04-28

## 2024-04-28 RX ORDER — AMOXICILLIN 250 MG
2 CAPSULE ORAL 2 TIMES DAILY PRN
Status: DISCONTINUED | OUTPATIENT
Start: 2024-04-28 | End: 2024-04-30 | Stop reason: HOSPADM

## 2024-04-28 RX ORDER — BISACODYL 5 MG/1
5 TABLET, DELAYED RELEASE ORAL DAILY PRN
Status: DISCONTINUED | OUTPATIENT
Start: 2024-04-28 | End: 2024-04-30 | Stop reason: HOSPADM

## 2024-04-28 RX ORDER — INSULIN LISPRO 100 [IU]/ML
2-7 INJECTION, SOLUTION INTRAVENOUS; SUBCUTANEOUS
Status: DISCONTINUED | OUTPATIENT
Start: 2024-04-28 | End: 2024-04-30 | Stop reason: HOSPADM

## 2024-04-28 RX ORDER — LORAZEPAM 2 MG/ML
INJECTION INTRAMUSCULAR
Status: COMPLETED
Start: 2024-04-28 | End: 2024-04-28

## 2024-04-28 RX ORDER — DEXTROSE MONOHYDRATE 25 G/50ML
25 INJECTION, SOLUTION INTRAVENOUS
Status: DISCONTINUED | OUTPATIENT
Start: 2024-04-28 | End: 2024-04-30 | Stop reason: HOSPADM

## 2024-04-28 RX ORDER — LEVOFLOXACIN 5 MG/ML
500 INJECTION, SOLUTION INTRAVENOUS
Status: DISCONTINUED | OUTPATIENT
Start: 2024-04-30 | End: 2024-04-28

## 2024-04-28 RX ORDER — LEVOFLOXACIN 5 MG/ML
750 INJECTION, SOLUTION INTRAVENOUS ONCE
Qty: 150 ML | Refills: 0 | Status: COMPLETED | OUTPATIENT
Start: 2024-04-28 | End: 2024-04-28

## 2024-04-28 RX ORDER — ONDANSETRON 2 MG/ML
4 INJECTION INTRAMUSCULAR; INTRAVENOUS ONCE
Status: COMPLETED | OUTPATIENT
Start: 2024-04-28 | End: 2024-04-28

## 2024-04-28 RX ORDER — SODIUM CHLORIDE 0.9 % (FLUSH) 0.9 %
10 SYRINGE (ML) INJECTION AS NEEDED
Status: DISCONTINUED | OUTPATIENT
Start: 2024-04-28 | End: 2024-04-30 | Stop reason: HOSPADM

## 2024-04-28 RX ORDER — METRONIDAZOLE 500 MG/100ML
500 INJECTION, SOLUTION INTRAVENOUS ONCE
Status: COMPLETED | OUTPATIENT
Start: 2024-04-28 | End: 2024-04-28

## 2024-04-28 RX ORDER — LORAZEPAM 2 MG/ML
1 INJECTION INTRAMUSCULAR ONCE
Status: COMPLETED | OUTPATIENT
Start: 2024-04-28 | End: 2024-04-28

## 2024-04-28 RX ORDER — SODIUM CHLORIDE 9 MG/ML
40 INJECTION, SOLUTION INTRAVENOUS AS NEEDED
Status: DISCONTINUED | OUTPATIENT
Start: 2024-04-28 | End: 2024-04-30 | Stop reason: HOSPADM

## 2024-04-28 RX ORDER — NITROGLYCERIN 0.4 MG/1
0.4 TABLET SUBLINGUAL
Status: DISCONTINUED | OUTPATIENT
Start: 2024-04-28 | End: 2024-04-30 | Stop reason: HOSPADM

## 2024-04-28 RX ORDER — SODIUM CHLORIDE 0.9 % (FLUSH) 0.9 %
10 SYRINGE (ML) INJECTION EVERY 12 HOURS SCHEDULED
Status: DISCONTINUED | OUTPATIENT
Start: 2024-04-28 | End: 2024-04-30 | Stop reason: HOSPADM

## 2024-04-28 RX ORDER — HEPARIN SODIUM 5000 [USP'U]/ML
5000 INJECTION, SOLUTION INTRAVENOUS; SUBCUTANEOUS EVERY 12 HOURS SCHEDULED
Status: DISCONTINUED | OUTPATIENT
Start: 2024-04-28 | End: 2024-04-29

## 2024-04-28 RX ORDER — NICOTINE POLACRILEX 4 MG
15 LOZENGE BUCCAL
Status: DISCONTINUED | OUTPATIENT
Start: 2024-04-28 | End: 2024-04-30 | Stop reason: HOSPADM

## 2024-04-28 RX ORDER — BISACODYL 10 MG
10 SUPPOSITORY, RECTAL RECTAL DAILY PRN
Status: DISCONTINUED | OUTPATIENT
Start: 2024-04-28 | End: 2024-04-30 | Stop reason: HOSPADM

## 2024-04-28 RX ORDER — HYDRALAZINE HYDROCHLORIDE 20 MG/ML
20 INJECTION INTRAMUSCULAR; INTRAVENOUS ONCE
Status: COMPLETED | OUTPATIENT
Start: 2024-04-28 | End: 2024-04-28

## 2024-04-28 RX ORDER — GLUCAGON 1 MG/ML
1 KIT INJECTION
Status: DISCONTINUED | OUTPATIENT
Start: 2024-04-28 | End: 2024-04-30 | Stop reason: HOSPADM

## 2024-04-28 RX ORDER — LEVOTHYROXINE SODIUM 0.1 MG/1
100 TABLET ORAL
Status: DISCONTINUED | OUTPATIENT
Start: 2024-04-28 | End: 2024-04-30 | Stop reason: HOSPADM

## 2024-04-28 RX ADMIN — INSULIN LISPRO 2 UNITS: 100 INJECTION, SOLUTION INTRAVENOUS; SUBCUTANEOUS at 23:15

## 2024-04-28 RX ADMIN — LEVOTHYROXINE SODIUM 100 MCG: 100 TABLET ORAL at 23:15

## 2024-04-28 RX ADMIN — LORAZEPAM 1 MG: 2 INJECTION INTRAMUSCULAR; INTRAVENOUS at 19:00

## 2024-04-28 RX ADMIN — LEVOFLOXACIN 750 MG: 5 INJECTION, SOLUTION INTRAVENOUS at 20:07

## 2024-04-28 RX ADMIN — ONDANSETRON 4 MG: 2 INJECTION INTRAMUSCULAR; INTRAVENOUS at 15:54

## 2024-04-28 RX ADMIN — SODIUM CHLORIDE 1000 ML: 9 INJECTION, SOLUTION INTRAVENOUS at 15:54

## 2024-04-28 RX ADMIN — LORAZEPAM 1 MG: 2 INJECTION INTRAMUSCULAR at 19:00

## 2024-04-28 RX ADMIN — METRONIDAZOLE 500 MG: 500 INJECTION, SOLUTION INTRAVENOUS at 19:45

## 2024-04-28 RX ADMIN — HYDRALAZINE HYDROCHLORIDE 20 MG: 20 INJECTION INTRAMUSCULAR; INTRAVENOUS at 18:20

## 2024-04-28 RX ADMIN — Medication 10 ML: at 23:17

## 2024-04-28 RX ADMIN — HEPARIN SODIUM 5000 UNITS: 5000 INJECTION INTRAVENOUS; SUBCUTANEOUS at 23:17

## 2024-04-28 RX ADMIN — LEVETIRACETAM 1000 MG: 10 INJECTION INTRAVENOUS at 21:14

## 2024-04-28 NOTE — ED PROVIDER NOTES
Subjective     History provided by:  Patient   used: No    Altered Mental Status  Presenting symptoms: confusion, disorientation and lethargy    Presenting symptoms: no behavior changes, no combativeness, no memory loss, no partial responsiveness and no unresponsiveness    Severity:  Mild  Most recent episode:  Today  Episode history:  Continuous  Duration:  8 hours  Timing:  Constant  Progression:  Waxing and waning  Chronicity:  New  Context: not alcohol use, not dementia, not drug use, not head injury, not homeless, taking medications as prescribed, not nursing home resident, not recent change in medication, not recent illness and not recent infection    Associated symptoms: seizures and weakness    Associated symptoms: no abdominal pain, normal movement, no agitation, no bladder incontinence, no decreased appetite, no depression, no difficulty breathing, no eye deviation, no fever, no hallucinations, no headaches, no light-headedness, no nausea, no palpitations, no rash, no slurred speech, no suicidal behavior, no visual change and no vomiting        Review of Systems   Constitutional:  Negative for activity change, appetite change, chills, decreased appetite, diaphoresis, fatigue and fever.   HENT:  Negative for congestion, ear pain and sore throat.    Eyes:  Negative for redness.   Respiratory:  Negative for cough, chest tightness, shortness of breath and wheezing.    Cardiovascular:  Negative for chest pain, palpitations and leg swelling.   Gastrointestinal:  Negative for abdominal pain, diarrhea, nausea and vomiting.   Genitourinary:  Negative for bladder incontinence, dysuria and urgency.   Musculoskeletal:  Negative for arthralgias, back pain, myalgias and neck pain.   Skin:  Negative for pallor, rash and wound.   Neurological:  Positive for seizures and weakness. Negative for dizziness, speech difficulty, light-headedness and headaches.   Psychiatric/Behavioral:  Positive for  confusion. Negative for agitation, behavioral problems, decreased concentration, hallucinations and memory loss.    All other systems reviewed and are negative.      Past Medical History:   Diagnosis Date    Arthritis     Closed fracture of right fibula and tibia 2018    Depression     Diabetic ulcer of left foot associated with type 2 diabetes mellitus 2017    Diastolic dysfunction     Disease of thyroid gland     Elevated cholesterol     End stage renal disease     Essential hypertension     GERD (gastroesophageal reflux disease)     History of transfusion     Hyperlipidemia     Iron deficiency anemia 2018    PAD (peripheral artery disease)     Renal failure     Type 2 diabetes mellitus with hyperglycemia, with long-term current use of insulin 2018       Allergies   Allergen Reactions    Penicillins Hives and GI Intolerance     Patient has received cefazolin, ceftriaxone and cefepime during past admissions.    Codeine Hives, Rash and GI Intolerance     Pt tolerates Douglas @ home    Sulfa Antibiotics Hives, Rash and GI Intolerance     NAUSEA TOO       Past Surgical History:   Procedure Laterality Date    ABDOMINAL SURGERY      BACK SURGERY      Post spinal diskectomy, osteophytectomy in one lumbar interspace    CATARACT EXTRACTION      Left      SECTION      DENTAL PROCEDURE      ENDOSCOPY      FRACTURE SURGERY      right leg    HYSTERECTOMY      INCISION AND DRAINAGE LEG Left 4/15/2017    Procedure: INCISION AND DRAINAGE LOWER EXTREMITY;  Surgeon: Basilio Morris MD;  Location: [facility] OR;  Service:     INCISION AND DRAINAGE LEG Left 2017    Procedure: Irrigation and Debridment abcess diabetic wound left foot with deep culture;  Surgeon: Basilio Morris MD;  Location: [facility] OR;  Service:     INSERTION PERITONEAL DIALYSIS CATHETER N/A 2021    Procedure: INSERTION PERITONEAL DIALYSIS CATHETER LAPAROSCOPIC;  Surgeon: Edy Glover MD;  Location: [facility] OR;  Service:  General;  Laterality: N/A;    KNEE ARTHROSCOPY Right     ORIF TIBIA FRACTURE Right 12/28/2018    Procedure: TIBIAL  FRACTURE OPEN REDUCTION INTERNAL FIXATION;  Surgeon: Jung Barragan MD;  Location: [facility];  Service: Orthopedics    TOE AMPUTATION Right     5th    TUBAL ABDOMINAL LIGATION         Family History   Problem Relation Age of Onset    Heart disease Mother     Cancer Mother     COPD Mother     Diabetes Other     Depression Other        Social History     Socioeconomic History    Marital status:    Tobacco Use    Smoking status: Never    Smokeless tobacco: Never   Vaping Use    Vaping status: Never Used   Substance and Sexual Activity    Alcohol use: No    Drug use: No    Sexual activity: Defer           Objective   Physical Exam  Vitals and nursing note reviewed.   Constitutional:       General: She is not in acute distress.     Appearance: Normal appearance. She is well-developed. She is ill-appearing. She is not toxic-appearing or diaphoretic.   HENT:      Head: Normocephalic and atraumatic.      Right Ear: External ear normal.      Left Ear: External ear normal.      Nose: Nose normal.      Mouth/Throat:      Pharynx: No oropharyngeal exudate.      Tonsils: No tonsillar exudate.   Eyes:      General: Lids are normal.      Conjunctiva/sclera: Conjunctivae normal.      Pupils: Pupils are equal, round, and reactive to light.   Neck:      Thyroid: No thyromegaly.   Cardiovascular:      Rate and Rhythm: Normal rate and regular rhythm.      Pulses: Normal pulses.      Heart sounds: Normal heart sounds, S1 normal and S2 normal.   Pulmonary:      Effort: Pulmonary effort is normal. No tachypnea or respiratory distress.      Breath sounds: Normal breath sounds. No decreased breath sounds, wheezing or rales.   Chest:      Chest wall: No tenderness.   Abdominal:      General: Bowel sounds are normal. There is no distension.      Palpations: Abdomen is soft.      Tenderness: There is no abdominal  tenderness. There is no guarding or rebound.   Musculoskeletal:         General: No tenderness or deformity. Normal range of motion.      Cervical back: Full passive range of motion without pain, normal range of motion and neck supple.   Lymphadenopathy:      Cervical: No cervical adenopathy.   Skin:     General: Skin is warm and dry.      Coloration: Skin is not pale.      Findings: No erythema or rash.   Neurological:      Mental Status: She is alert and oriented to person, place, and time.      GCS: GCS eye subscore is 4. GCS verbal subscore is 4. GCS motor subscore is 6.      Cranial Nerves: No cranial nerve deficit.      Sensory: No sensory deficit.   Psychiatric:         Speech: Speech normal.         Behavior: Behavior normal.         Thought Content: Thought content normal.         Judgment: Judgment normal.         Procedures           ED Course  ED Course as of 04/30/24 1120   Sun Apr 28, 2024 1914 CT Head Without Contrast  Impression:     No CT evidence of an acute intracranial process.   [ES]   1914 CT Abdomen Pelvis Without Contrast  Impression:  1.  Small amount of ascites likely related to peritoneal dialysis.  2.  Wall thickening of the sigmoid colon and rectum compatible with  colitis.   [ES]   1916 ECG 12 Lead Other; weakness  Vent. Rate :  73 BPM     Atrial Rate :  73 BPM     P-R Int : 164 ms          QRS Dur :  88 ms      QT Int : 440 ms       P-R-T Axes :  68  36  30 degrees     QTc Int : 484 ms     Normal sinus rhythm  Normal ECG  When compared with ECG of 15-APR-2024 15:06,  No significant change was found   [ES]   1952 CT Head Without Contrast  Impression:     No CT evidence of an acute intracranial process.   [ES]   2332 Spoke  [ES]      ED Course User Index  [ES] Clay Maradiaga MD                                             Medical Decision Making  Problems Addressed:  Debility: complicated acute illness or injury  Seizure: complicated acute illness or injury  Seizure-like  activity: complicated acute illness or injury  Stage 5 chronic kidney disease on chronic dialysis: complicated acute illness or injury    Amount and/or Complexity of Data Reviewed  Labs: ordered.  Radiology: ordered. Decision-making details documented in ED Course.  ECG/medicine tests: ordered. Decision-making details documented in ED Course.    Risk  OTC drugs.  Prescription drug management.  Decision regarding hospitalization.        Final diagnoses:   Seizure   Debility   Seizure-like activity   Stage 5 chronic kidney disease on chronic dialysis       ED Disposition  ED Disposition       ED Disposition   Decision to Admit    Condition   --    Comment   Level of Care: Telemetry [5]   Diagnosis: Seizure-like activity [156256]   Admitting Physician: REBECCA GONZALEZ [[id number]]   Attending Physician: REBECCA GONZALEZ [[id number]]   Certification: I Certify That Inpatient Hospital Services Are Medically Necessary For Greater Than 2 Midnights                 Susan Stephens, DARIUS  [address] [facility] [address]  [phone]      [address] [place] [address]         Medication List        New Prescriptions      carvedilol 6.25 MG tablet  Commonly known as: COREG  Take 1 tablet by mouth 2 (Two) Times a Day With Meals.     ciprofloxacin 500 MG tablet  Commonly known as: Cipro  Take 1 tablet by mouth Daily for 4 days.     Insulin Lispro 100 UNIT/ML injection  Commonly known as: humaLOG  Inject 2-7 Units under the skin into the appropriate area as directed 4 (Four) Times a Day Before Meals & at Bedtime for 30 days.     lacosamide 50 MG tablet tablet  Commonly known as: VIMPAT  Take 1 tablet by mouth Every 12 (Twelve) Hours for 30 days.     metroNIDAZOLE 500 MG tablet  Commonly known as: FLAGYL  Take 1 tablet by mouth 3 (Three) Times a Day for 5 days.     NIFEdipine XL 90 MG 24 hr tablet  Commonly known as: PROCARDIA XL  Take 1 tablet by mouth Daily for 30 days.            Stop      insulin degludec 100 UNIT/ML solution pen-injector  injection  Commonly known as: TRESIBA FLEXTOUCH     metoprolol succinate XL 25 MG 24 hr tablet  Commonly known as: TOPROL-XL               Where to Get Your Medications        [address] [place] [place] [address]      Hours: Monday to Friday 7 AM to 6 PM Phone: [phone]   carvedilol 6.25 MG tablet  ciprofloxacin 500 MG tablet  Insulin Lispro 100 UNIT/ML injection  lacosamide 50 MG tablet tablet  metroNIDAZOLE 500 MG tablet  NIFEdipine XL 90 MG 24 hr tablet            Clay Maradiaga MD  04/30/24 3696

## 2024-04-28 NOTE — ED NOTES
Pt noted to be having seizure like activity at this time, provider called to bedside, new orders noted.

## 2024-04-29 ENCOUNTER — APPOINTMENT (OUTPATIENT)
Dept: RESPIRATORY THERAPY | Facility: HOSPITAL | Age: 64
End: 2024-04-29
Payer: MEDICARE

## 2024-04-29 ENCOUNTER — APPOINTMENT (OUTPATIENT)
Dept: MRI IMAGING | Facility: HOSPITAL | Age: 64
End: 2024-04-29
Payer: MEDICARE

## 2024-04-29 LAB
ALBUMIN SERPL-MCNC: 2.9 G/DL (ref 3.5–5.2)
ALBUMIN/GLOB SERPL: 1 G/DL
ALP SERPL-CCNC: 86 U/L (ref 39–117)
ALT SERPL W P-5'-P-CCNC: 15 U/L (ref 1–33)
AMMONIA BLD-SCNC: 20 UMOL/L (ref 11–51)
ANION GAP SERPL CALCULATED.3IONS-SCNC: 12.3 MMOL/L (ref 5–15)
AST SERPL-CCNC: 24 U/L (ref 1–32)
BILIRUB SERPL-MCNC: 0.2 MG/DL (ref 0–1.2)
BUN SERPL-MCNC: 31 MG/DL (ref 8–23)
BUN/CREAT SERPL: 6.2 (ref 7–25)
CALCIUM SPEC-SCNC: 7.3 MG/DL (ref 8.6–10.5)
CHLORIDE SERPL-SCNC: 104 MMOL/L (ref 98–107)
CO2 SERPL-SCNC: 23.7 MMOL/L (ref 22–29)
CREAT SERPL-MCNC: 5 MG/DL (ref 0.57–1)
EGFRCR SERPLBLD CKD-EPI 2021: 9 ML/MIN/1.73
GLOBULIN UR ELPH-MCNC: 2.8 GM/DL
GLUCOSE BLDC GLUCOMTR-MCNC: 165 MG/DL (ref 70–130)
GLUCOSE BLDC GLUCOMTR-MCNC: 192 MG/DL (ref 70–130)
GLUCOSE BLDC GLUCOMTR-MCNC: 52 MG/DL (ref 70–130)
GLUCOSE BLDC GLUCOMTR-MCNC: 66 MG/DL (ref 70–130)
GLUCOSE BLDC GLUCOMTR-MCNC: 85 MG/DL (ref 70–130)
GLUCOSE SERPL-MCNC: 153 MG/DL (ref 65–99)
HAV IGM SERPL QL IA: NORMAL
HBV CORE IGM SERPL QL IA: NORMAL
HBV SURFACE AG SERPL QL IA: NORMAL
HCV AB SER QL: NORMAL
MAGNESIUM SERPL-MCNC: 1.9 MG/DL (ref 1.6–2.4)
POTASSIUM SERPL-SCNC: 4.4 MMOL/L (ref 3.5–5.2)
PROLACTIN SERPL-MCNC: 12.5 NG/ML (ref 4.79–23.3)
PROT SERPL-MCNC: 5.7 G/DL (ref 6–8.5)
SODIUM SERPL-SCNC: 140 MMOL/L (ref 136–145)

## 2024-04-29 PROCEDURE — 95819 EEG AWAKE AND ASLEEP: CPT

## 2024-04-29 PROCEDURE — 99222 1ST HOSP IP/OBS MODERATE 55: CPT | Performed by: NURSE PRACTITIONER

## 2024-04-29 PROCEDURE — 96372 THER/PROPH/DIAG INJ SC/IM: CPT

## 2024-04-29 PROCEDURE — 25010000002 HEPARIN (PORCINE) PER 1000 UNITS: Performed by: STUDENT IN AN ORGANIZED HEALTH CARE EDUCATION/TRAINING PROGRAM

## 2024-04-29 PROCEDURE — 70551 MRI BRAIN STEM W/O DYE: CPT | Performed by: RADIOLOGY

## 2024-04-29 PROCEDURE — 25010000002 CEFEPIME PER 500 MG: Performed by: HOSPITALIST

## 2024-04-29 PROCEDURE — 70551 MRI BRAIN STEM W/O DYE: CPT

## 2024-04-29 PROCEDURE — 25010000002 METRONIDAZOLE 500 MG/100ML SOLUTION: Performed by: HOSPITALIST

## 2024-04-29 PROCEDURE — 96366 THER/PROPH/DIAG IV INF ADDON: CPT

## 2024-04-29 PROCEDURE — 63710000001 INSULIN LISPRO (HUMAN) PER 5 UNITS: Performed by: HOSPITALIST

## 2024-04-29 PROCEDURE — 95819 EEG AWAKE AND ASLEEP: CPT | Performed by: PSYCHIATRY & NEUROLOGY

## 2024-04-29 PROCEDURE — 82948 REAGENT STRIP/BLOOD GLUCOSE: CPT

## 2024-04-29 PROCEDURE — 25010000002 HEPARIN (PORCINE) PER 1000 UNITS: Performed by: HOSPITALIST

## 2024-04-29 PROCEDURE — 96367 TX/PROPH/DG ADDL SEQ IV INF: CPT

## 2024-04-29 PROCEDURE — 80074 ACUTE HEPATITIS PANEL: CPT | Performed by: INTERNAL MEDICINE

## 2024-04-29 RX ORDER — METOPROLOL SUCCINATE 25 MG/1
25 TABLET, EXTENDED RELEASE ORAL NIGHTLY
Status: CANCELLED | OUTPATIENT
Start: 2024-04-29

## 2024-04-29 RX ORDER — TIZANIDINE 4 MG/1
4 TABLET ORAL EVERY 6 HOURS PRN
Status: DISCONTINUED | OUTPATIENT
Start: 2024-04-29 | End: 2024-04-30 | Stop reason: HOSPADM

## 2024-04-29 RX ORDER — ROPINIROLE 0.25 MG/1
0.5 TABLET, FILM COATED ORAL 2 TIMES DAILY
Status: DISCONTINUED | OUTPATIENT
Start: 2024-04-29 | End: 2024-04-30 | Stop reason: HOSPADM

## 2024-04-29 RX ORDER — HEPARIN SODIUM 5000 [USP'U]/ML
5000 INJECTION, SOLUTION INTRAVENOUS; SUBCUTANEOUS EVERY 8 HOURS SCHEDULED
Status: DISCONTINUED | OUTPATIENT
Start: 2024-04-29 | End: 2024-04-30 | Stop reason: HOSPADM

## 2024-04-29 RX ORDER — LEVOTHYROXINE SODIUM 0.05 MG/1
50 TABLET ORAL DAILY
COMMUNITY
End: 2024-04-30

## 2024-04-29 RX ORDER — FLUTICASONE PROPIONATE 50 MCG
2 SPRAY, SUSPENSION (ML) NASAL DAILY PRN
Status: DISCONTINUED | OUTPATIENT
Start: 2024-04-29 | End: 2024-04-30 | Stop reason: HOSPADM

## 2024-04-29 RX ORDER — TRAZODONE HYDROCHLORIDE 50 MG/1
50 TABLET ORAL NIGHTLY
COMMUNITY

## 2024-04-29 RX ORDER — LEVETIRACETAM 250 MG/1
500 TABLET ORAL 2 TIMES DAILY
Status: DISCONTINUED | OUTPATIENT
Start: 2024-04-29 | End: 2024-04-29

## 2024-04-29 RX ORDER — FOLIC ACID 1 MG/1
1 TABLET ORAL DAILY
Status: DISCONTINUED | OUTPATIENT
Start: 2024-04-29 | End: 2024-04-30 | Stop reason: HOSPADM

## 2024-04-29 RX ORDER — ONDANSETRON 4 MG/1
4 TABLET, ORALLY DISINTEGRATING ORAL DAILY PRN
Status: DISCONTINUED | OUTPATIENT
Start: 2024-04-29 | End: 2024-04-30 | Stop reason: HOSPADM

## 2024-04-29 RX ORDER — LACOSAMIDE 50 MG/1
50 TABLET ORAL EVERY 12 HOURS SCHEDULED
Status: DISCONTINUED | OUTPATIENT
Start: 2024-04-29 | End: 2024-04-30 | Stop reason: HOSPADM

## 2024-04-29 RX ORDER — FOLIC ACID 1 MG/1
1 TABLET ORAL DAILY
COMMUNITY

## 2024-04-29 RX ORDER — LORAZEPAM 2 MG/ML
1 INJECTION INTRAMUSCULAR EVERY 4 HOURS PRN
Status: DISCONTINUED | OUTPATIENT
Start: 2024-04-29 | End: 2024-04-30 | Stop reason: HOSPADM

## 2024-04-29 RX ORDER — ERGOCALCIFEROL 1.25 MG/1
50000 CAPSULE ORAL WEEKLY
COMMUNITY

## 2024-04-29 RX ORDER — NIFEDIPINE 30 MG
90 TABLET, EXTENDED RELEASE ORAL
Status: DISCONTINUED | OUTPATIENT
Start: 2024-04-30 | End: 2024-04-30 | Stop reason: HOSPADM

## 2024-04-29 RX ORDER — HYDRALAZINE HYDROCHLORIDE 25 MG/1
25 TABLET, FILM COATED ORAL 3 TIMES DAILY PRN
Status: DISCONTINUED | OUTPATIENT
Start: 2024-04-29 | End: 2024-04-30 | Stop reason: HOSPADM

## 2024-04-29 RX ORDER — TRAZODONE HYDROCHLORIDE 50 MG/1
50 TABLET ORAL NIGHTLY
Status: DISCONTINUED | OUTPATIENT
Start: 2024-04-29 | End: 2024-04-30 | Stop reason: HOSPADM

## 2024-04-29 RX ORDER — AMLODIPINE BESYLATE 10 MG/1
10 TABLET ORAL
Status: DISCONTINUED | OUTPATIENT
Start: 2024-04-29 | End: 2024-04-29

## 2024-04-29 RX ORDER — LEVETIRACETAM 100 MG/ML
500 SOLUTION ORAL
Status: DISCONTINUED | OUTPATIENT
Start: 2024-04-29 | End: 2024-04-29

## 2024-04-29 RX ORDER — HYDROCODONE BITARTRATE AND ACETAMINOPHEN 10; 325 MG/1; MG/1
1 TABLET ORAL EVERY 8 HOURS PRN
Status: DISCONTINUED | OUTPATIENT
Start: 2024-04-29 | End: 2024-04-30 | Stop reason: HOSPADM

## 2024-04-29 RX ORDER — GABAPENTIN 300 MG/1
300 CAPSULE ORAL DAILY
COMMUNITY
End: 2024-04-29 | Stop reason: ALTCHOICE

## 2024-04-29 RX ORDER — METRONIDAZOLE 500 MG/100ML
500 INJECTION, SOLUTION INTRAVENOUS EVERY 8 HOURS
Qty: 1500 ML | Refills: 0 | Status: DISCONTINUED | OUTPATIENT
Start: 2024-04-29 | End: 2024-04-30 | Stop reason: HOSPADM

## 2024-04-29 RX ORDER — PANTOPRAZOLE SODIUM 40 MG/1
40 TABLET, DELAYED RELEASE ORAL DAILY
Status: DISCONTINUED | OUTPATIENT
Start: 2024-04-29 | End: 2024-04-30 | Stop reason: HOSPADM

## 2024-04-29 RX ORDER — ATORVASTATIN CALCIUM 40 MG/1
80 TABLET, FILM COATED ORAL NIGHTLY
Status: DISCONTINUED | OUTPATIENT
Start: 2024-04-29 | End: 2024-04-30 | Stop reason: HOSPADM

## 2024-04-29 RX ORDER — LOPERAMIDE HYDROCHLORIDE 2 MG/1
2 CAPSULE ORAL 4 TIMES DAILY PRN
Status: DISCONTINUED | OUTPATIENT
Start: 2024-04-29 | End: 2024-04-30 | Stop reason: HOSPADM

## 2024-04-29 RX ORDER — CARVEDILOL 6.25 MG/1
6.25 TABLET ORAL 2 TIMES DAILY WITH MEALS
Status: DISCONTINUED | OUTPATIENT
Start: 2024-04-29 | End: 2024-04-30 | Stop reason: HOSPADM

## 2024-04-29 RX ORDER — TIZANIDINE 4 MG/1
2 TABLET ORAL EVERY 8 HOURS PRN
Status: DISCONTINUED | OUTPATIENT
Start: 2024-04-29 | End: 2024-04-29

## 2024-04-29 RX ORDER — LEVETIRACETAM 100 MG/ML
500 SOLUTION ORAL EVERY 12 HOURS SCHEDULED
Status: DISCONTINUED | OUTPATIENT
Start: 2024-04-29 | End: 2024-04-29

## 2024-04-29 RX ORDER — HYDROXYZINE HYDROCHLORIDE 25 MG/1
25 TABLET, FILM COATED ORAL 3 TIMES DAILY PRN
Status: DISCONTINUED | OUTPATIENT
Start: 2024-04-29 | End: 2024-04-30 | Stop reason: HOSPADM

## 2024-04-29 RX ADMIN — LACOSAMIDE 50 MG: 50 TABLET, FILM COATED ORAL at 21:48

## 2024-04-29 RX ADMIN — INSULIN LISPRO 2 UNITS: 100 INJECTION, SOLUTION INTRAVENOUS; SUBCUTANEOUS at 06:55

## 2024-04-29 RX ADMIN — ATORVASTATIN CALCIUM 80 MG: 40 TABLET, FILM COATED ORAL at 21:48

## 2024-04-29 RX ADMIN — INSULIN LISPRO 2 UNITS: 100 INJECTION, SOLUTION INTRAVENOUS; SUBCUTANEOUS at 21:49

## 2024-04-29 RX ADMIN — TRAZODONE HYDROCHLORIDE 50 MG: 50 TABLET ORAL at 21:48

## 2024-04-29 RX ADMIN — CARVEDILOL 6.25 MG: 6.25 TABLET, FILM COATED ORAL at 18:14

## 2024-04-29 RX ADMIN — AMLODIPINE BESYLATE 10 MG: 5 TABLET ORAL at 12:18

## 2024-04-29 RX ADMIN — Medication 10 ML: at 21:49

## 2024-04-29 RX ADMIN — METRONIDAZOLE 500 MG: 500 INJECTION, SOLUTION INTRAVENOUS at 21:48

## 2024-04-29 RX ADMIN — PANTOPRAZOLE SODIUM 40 MG: 40 TABLET, DELAYED RELEASE ORAL at 18:14

## 2024-04-29 RX ADMIN — INSULIN LISPRO 2 UNITS: 100 INJECTION, SOLUTION INTRAVENOUS; SUBCUTANEOUS at 18:14

## 2024-04-29 RX ADMIN — HYDRALAZINE HYDROCHLORIDE 25 MG: 25 TABLET ORAL at 12:51

## 2024-04-29 RX ADMIN — METRONIDAZOLE 500 MG: 500 INJECTION, SOLUTION INTRAVENOUS at 12:13

## 2024-04-29 RX ADMIN — HEPARIN SODIUM 5000 UNITS: 5000 INJECTION INTRAVENOUS; SUBCUTANEOUS at 09:57

## 2024-04-29 RX ADMIN — Medication 10 ML: at 09:00

## 2024-04-29 RX ADMIN — CEFEPIME HYDROCHLORIDE 1000 MG: 1 INJECTION, POWDER, FOR SOLUTION INTRAMUSCULAR; INTRAVENOUS at 01:51

## 2024-04-29 RX ADMIN — HYDROCODONE BITARTRATE AND ACETAMINOPHEN 1 TABLET: 10; 325 TABLET ORAL at 12:50

## 2024-04-29 RX ADMIN — HEPARIN SODIUM 5000 UNITS: 5000 INJECTION INTRAVENOUS; SUBCUTANEOUS at 15:23

## 2024-04-29 RX ADMIN — ROPINIROLE HYDROCHLORIDE 0.5 MG: 0.25 TABLET, FILM COATED ORAL at 21:48

## 2024-04-29 RX ADMIN — LACOSAMIDE 50 MG: 50 TABLET, FILM COATED ORAL at 11:24

## 2024-04-29 RX ADMIN — LEVOTHYROXINE SODIUM 100 MCG: 100 TABLET ORAL at 06:55

## 2024-04-29 RX ADMIN — Medication 1 MG: at 18:14

## 2024-04-29 RX ADMIN — LEVETIRACETAM 500 MG: 100 SOLUTION ORAL at 09:57

## 2024-04-29 RX ADMIN — TIZANIDINE 4 MG: 4 TABLET ORAL at 15:21

## 2024-04-29 RX ADMIN — METRONIDAZOLE 500 MG: 500 INJECTION, SOLUTION INTRAVENOUS at 04:04

## 2024-04-29 RX ADMIN — HEPARIN SODIUM 5000 UNITS: 5000 INJECTION INTRAVENOUS; SUBCUTANEOUS at 21:48

## 2024-04-29 RX ADMIN — CEFEPIME HYDROCHLORIDE 1000 MG: 1 INJECTION, POWDER, FOR SOLUTION INTRAMUSCULAR; INTRAVENOUS at 23:56

## 2024-04-29 NOTE — H&P
Hospitalist History and Physical        Patient Identification  Name: Reny Elena  Age/Sex: 64 y.o. female  :  1960        MRN: [id number]  Visit Number: [id number]  Admit Date: 2024   PCP: Susan Stephens APRN          Chief complaint altered mental status, seizure like activity    History of Present Illness:  Patient is a 64 y.o. female with history of type II DM, hypothyroidism, ESRD on PD, HTN, GERD, HLD, PAD, and arthritis, who presents with reports of altered mental status and seizure like activity at home. Per family report to ED, she was found on the toilet not responding and shaking some. When she arrived to the ED, she was awake but confused. Then shortly after, she had a witnessed seizure and was given IV ativan and loaded with IV keppra. She was reportedly fairly somnolent thereafter for a few hours but is now awake again. Workup in the ED revealed chronically elevated troponin and BNP. Cr was 5.03. TSH was elevated at 142 with free T4 0.79 but these are both improvements compared to recent thyroid function tests. CRP, WBC and lactate were normal but procal was elevated at 0.45. CT head and chest showed no acute abnormality. CT abdomen/pelvis showed small ascites likely related to peritoneal dialysis, along with wall thickening of sigmoid colon and rectum compatible with colitis. Patient was awake during my exam in the ED. She was oriented to self and place but did not know the year. She was very emotionally labile to the point of tears at times and would was tangential in her thought processes. Her 5th right toe had been amputated; when asked when this happened, she said 2 weeks ago but it was obvious it had happened remotely. She then said maybe 2 years, and that she had all her dates mixed up. When asked the reason for the amputation, she said she fell after having a seizure and got her toe caught under her bed when she fell. But then when I asked if she had a history of seizures,  she said she wasn't sure. She was tender to palpation of her LLQ on exam. When asked about this, she reported having pain in said area for a few days, along with diarrhea. When asked if she knew why she was brought to the ED today, she recalled feeling like her entire body was shaking at one point earlier today and she had no control over it. She does not recall whether she lost consciousness.     Review of Systems  Review of Systems   Reason unable to perform ROS: limited as patient is confused and tangential in her thought processes.   Gastrointestinal:  Positive for abdominal pain and diarrhea.   Neurological:  Positive for tremors and seizures.   Psychiatric/Behavioral:  Positive for confusion.        History  Past Medical History:   Diagnosis Date    Arthritis     Closed fracture of right fibula and tibia 2018    Depression     Diabetic ulcer of left foot associated with type 2 diabetes mellitus 2017    Diastolic dysfunction     Disease of thyroid gland     Elevated cholesterol     End stage renal disease     Essential hypertension     GERD (gastroesophageal reflux disease)     History of transfusion     Hyperlipidemia     Iron deficiency anemia 2018    PAD (peripheral artery disease)     Renal failure     Type 2 diabetes mellitus with hyperglycemia, with long-term current use of insulin 2018     Past Surgical History:   Procedure Laterality Date    ABDOMINAL SURGERY      BACK SURGERY      Post spinal diskectomy, osteophytectomy in one lumbar interspace    CATARACT EXTRACTION      Left      SECTION      DENTAL PROCEDURE      ENDOSCOPY      FRACTURE SURGERY      right leg    HYSTERECTOMY      INCISION AND DRAINAGE LEG Left 4/15/2017    Procedure: INCISION AND DRAINAGE LOWER EXTREMITY;  Surgeon: Basilio Morris MD;  Location: [facility];  Service:     INCISION AND DRAINAGE LEG Left 2017    Procedure: Irrigation and Debridment abcess diabetic wound left foot with deep culture;   Surgeon: Basilio Morris MD;  Location:  [facility] OR;  Service:     INSERTION PERITONEAL DIALYSIS CATHETER N/A 12/16/2021    Procedure: INSERTION PERITONEAL DIALYSIS CATHETER LAPAROSCOPIC;  Surgeon: Edy Glover MD;  Location:  [facility] OR;  Service: General;  Laterality: N/A;    KNEE ARTHROSCOPY Right     ORIF TIBIA FRACTURE Right 12/28/2018    Procedure: TIBIAL  FRACTURE OPEN REDUCTION INTERNAL FIXATION;  Surgeon: Jung Barragna MD;  Location: [facility] OR;  Service: Orthopedics    TOE AMPUTATION Right     5th    TUBAL ABDOMINAL LIGATION       Family History   Problem Relation Age of Onset    Heart disease Mother     Cancer Mother     COPD Mother     Diabetes Other     Depression Other      Social History     Tobacco Use    Smoking status: Never    Smokeless tobacco: Never   Vaping Use    Vaping status: Never Used   Substance Use Topics    Alcohol use: No    Drug use: No     (Not in a hospital admission)    Allergies:  Penicillins, Codeine, and Sulfa antibiotics    Objective     Vital Signs  Temp:  [97.9 °F (36.6 °C)] 97.9 °F (36.6 °C)  Heart Rate:  [70-92] 92  Resp:  [18] 18  BP: (110-212)/() 110/78  Body mass index is 18.82 kg/m².    Physical Exam:  Physical Exam  Constitutional:       Comments: Elderly female, frail in appearance, appears chronically ill, no acute distress   HENT:      Head: Normocephalic and atraumatic.      Right Ear: External ear normal.      Left Ear: External ear normal.      Nose: Nose normal.      Mouth/Throat:      Mouth: Mucous membranes are moist.      Pharynx: Oropharynx is clear.   Eyes:      Extraocular Movements: Extraocular movements intact.      Conjunctiva/sclera: Conjunctivae normal.      Pupils: Pupils are equal, round, and reactive to light.   Cardiovascular:      Rate and Rhythm: Normal rate and regular rhythm.      Pulses: Normal pulses.      Heart sounds: Normal heart sounds. No murmur heard.  Pulmonary:      Effort: Pulmonary effort is normal. No respiratory distress.       Breath sounds: Normal breath sounds. No wheezing or rales.   Abdominal:      General: Abdomen is flat. Bowel sounds are normal. There is no distension.      Palpations: Abdomen is soft.      Tenderness: There is abdominal tenderness (LLQ).      Comments: PD catheter noted RUQ   Musculoskeletal:         General: Normal range of motion.      Cervical back: Normal range of motion and neck supple. No tenderness.      Right lower leg: No edema.      Left lower leg: No edema.      Comments: S/p amputation right 5th toe. Malformed left 5th toe. Has scare proximal to toe on lateral foot.    Lymphadenopathy:      Cervical: No cervical adenopathy.   Skin:     General: Skin is warm and dry.      Capillary Refill: Capillary refill takes less than 2 seconds.      Coloration: Skin is not jaundiced.      Findings: No bruising or lesion.   Neurological:      General: No focal deficit present.      Comments: Oriented to self, place but not time   Psychiatric:      Comments: Emotionally labile           Results Review:       Lab Results:  Results from last 7 days   Lab Units 04/28/24 2338 04/28/24  1558   WBC 10*3/mm3 4.93 5.33   HEMOGLOBIN g/dL 10.3* 11.5*   PLATELETS 10*3/mm3 148 169     Results from last 7 days   Lab Units 04/28/24  1558   CRP mg/dL <0.30     Results from last 7 days   Lab Units 04/28/24 2338 04/28/24  1558   SODIUM mmol/L 140 142   POTASSIUM mmol/L 4.4 4.1   CHLORIDE mmol/L 104 100   CO2 mmol/L 23.7 25.7   BUN mg/dL 31* 32*   CREATININE mg/dL 5.00* 5.07*   CALCIUM mg/dL 7.3* 8.1*   GLUCOSE mg/dL 153* 181*     Results from last 7 days   Lab Units 04/28/24  1558   MAGNESIUM mg/dL 1.9     Hemoglobin A1C   Date Value Ref Range Status   04/28/2024 6.40 (H) 4.80 - 5.60 % Final     Results from last 7 days   Lab Units 04/28/24  2338 04/28/24  1558   BILIRUBIN mg/dL 0.2 0.3   ALK PHOS U/L 86 110   AST (SGOT) U/L 24 28   ALT (SGPT) U/L 15 18     Results from last 7 days   Lab Units 04/28/24 1814 04/28/24  1558    CK TOTAL U/L 104  --    HSTROP T ng/L 131* 159*         Results from last 7 days   Lab Units 04/28/24  1558   INR  0.94           I have reviewed the patient's laboratory results.    Imaging:  Imaging Results (Last 72 Hours)       Procedure Component Value Units Date/Time    CT Chest Without Contrast Diagnostic [[id number]] Collected: 04/28/24 1913     Updated: 04/28/24 1915    Narrative:      Noncontrast CT chest     Indications: Dyspnea abdominal pain nausea vomiting chest pain and  altered mental status     Technique: Noncontrast CT images of the chest were obtained.  Limited  exposure control, adjustment of the MA and/or KV according to patient  size or use of an iterative reconstruction technique was utilized.     Findings CT chest:     No priors available.     The heart and mediastinum are normal.  There is no pericardial effusion.   There is no hilar or mediastinal lymphadenopathy.     Lungs are free from infiltrate and effusion.  There is patchy  atelectasis within the right middle lobe.  There is patchy atelectasis  in the region of the lingula.     The osseous structures are normal.       Impression:      Impression:     No acute findings     This report was finalized on 4/28/2024 7:13 PM by Chin Chowdhury MD.       CT Abdomen Pelvis Without Contrast [[id number]] Collected: 04/28/24 1910     Updated: 04/28/24 1913    Narrative:      Noncontrast CT abdomen pelvis     Indications: Chest pain altered mental status nausea and vomiting     Technique: Noncontrast CT images of the abdomen and pelvis were  obtained.  Limited exposure control, adjustment of the MA and/or KV  according to patient size or use of an iterative reconstruction  technique was utilized.     Findings CT abdomen and pelvis:     Comparison is made to the prior study dated 12/14/2023.     The liver has a normal appearance.  There is no biliary dilation.  Small  amount of perihepatic ascites is present.     The spleen is normal.  There is a  small amount of perisplenic ascites.     The pancreas is atrophic, otherwise normal.     The adrenal glands are normal.  The kidneys are atrophic, otherwise  normal.     There is no abdominal or retroperitoneal adenopathy.     There is diffuse wall thickening involving the sigmoid colon.  No  evidence of abscess .     There is a small amount of free fluid in the dependent pelvis.   Abdominal catheter is seen in place, possibly a peritoneal dialysis  catheter.     There is an L1 compression fracture, unchanged.       Impression:      Impression:  1.  Small amount of ascites likely related to peritoneal dialysis.  2.  Wall thickening of the sigmoid colon and rectum compatible with  colitis.     This report was finalized on 4/28/2024 7:11 PM by Chin Chowdhury MD.       CT Head Without Contrast [[id number]] Collected: 04/28/24 1906     Updated: 04/28/24 1909    Narrative:      Noncontrast CT brain     Indications: Dyspnea and nausea vomiting chest pain altered mental  status     Technique: Noncontrast CT images of the brain were obtained.  Limited  exposure control, adjustment of the MA and/or KV according to patient  size or use of an iterative reconstruction technique was utilized.     Findings CT brain:     Comparison is made to the prior study dated 4/15/2024.     There is no evidence of acute intracranial hemorrhage.  Ventricles are  normal in size and position.  Patchy and confluent areas of hypodensity  involve the periventricular deep white matter compatible with sequelae  of chronic small vessel ischemic change.  There is no mass-effect or  midline shift.  No abnormal extra-axial fluid collections are  demonstrated.     The bony calvarium is normal.       Impression:      Impression:     No CT evidence of an acute intracranial process.     This report was finalized on 4/28/2024 7:07 PM by Chin Chowdhury MD.               I have personally reviewed the patient's radiologic imaging.        EKG:   Normal  sinus rhythm, HR 73, QTc 484  Normal ECG  When compared with ECG of 15-APR-2024 15:06,  No significant change was found  Confirmed by Natacha Link ([id number]) on 4/28/2024 9:45:33 PM    I have personally reviewed the patient's EKG. No overt ST changes appreciated         Assessment & Plan     - Seizure-like activity: received loading dose IV keppra in ED. Pharmacy consulted to dose keppra moving forward in light of ESRD on PD. No seizure burden reportedly seen on cerebell x1.5 hours. Obtain formal EEG in the morning. Seizure precautions and q4h neuro checks ordered. Will make IV ativan available PRN for recurrent seizure activity. Neurology consulted and now following.   - Sigmoid colitis: does not meet SIRS criteria at this time, but symptomatic with LLQ pain and diarrhea. Treat with IV cefepime and flagyl.   - Troponin elevation: chronic, in setting of ESRD. EKG Not felt to show any evidence of acute ischemia. Patient at one point reports chest pain but later refuted it and said it was not any time recently. Troponin trending down on reflex. No further workup planned at this time.   - ESRD on PD: nephrology consulted to arrange PD.   - Hypothyroidism: TSH and free T4 improving from prior lab draws but still significantly abnormal. Increase home synthroid from 50 to 100mcg daily. Will need outpatient follow up with repeat thyroid function tests in 4-6 weeks.   - Type II DM: monitor accuchecks, cover with SSI.     DVT Prophylaxis: SQ heparin    Estimated Length of Stay >2 midnights    I discussed the patient's findings, assessment and plan with the patient and ED staff.     Patient is high risk due to seizure like activity    Erwin Tatum MD  04/29/24  00:20 EDT

## 2024-04-29 NOTE — CONSULTS
Neurology Consult Note    Patient Name: Reny Elena   MRN: [id number]  Age: 64 y.o.  Sex: female  : 1960    Primary Care Physician: Susan Stephens APRN  Referring Physician:  No ref. provider found    Chief Complaint/Reason for Consultation: Seizure    Subjective .  HPI: Headache/dizziness.  64-year-old female PMHx significant for diabetes type 2, hypothyroidism, ESRD on PD, hypertension, GERD, hyperlipidemia, peripheral artery disease, and arthritis who presented to ED on 2024 with seizure-like activity at home.  Reportedly found on the toilet, not responding and since shaking some.  On arrival to ED she was awake but confused.  Shortly after arrival show witnessed seizure which was aborted with IV Ativan.  She was loaded with Keppra, and pharmacy was consulted for renal dosing maintenance Keppra.  Remained somnolent for a few hours after seizure but then slowly became more awake.  On exam patient denies any history of seizure disorder.  States she does have a history of migraines but does not currently have a headache.  Ceribell monitoring showed 0% seizure burden.  She complains of right shoulder pain.  Tells us that she has frequent episodes of blacking out.  No more seizure activity after initial episode in ED.  She lives at home with her boyfriend who provides her care.    Review of Systems   Constitutional:  Positive for fatigue. Negative for chills and fever.   Respiratory: Negative.     Cardiovascular: Negative.    Gastrointestinal:  Negative for nausea and vomiting.   Musculoskeletal:  Positive for arthralgias and myalgias.   Neurological:  Positive for dizziness, seizures, syncope, weakness, light-headedness and headaches. Negative for speech difficulty.   Psychiatric/Behavioral:  The patient is nervous/anxious.       Past Medical History:   Diagnosis Date    Arthritis     Closed fracture of right fibula and tibia 2018    Depression     Diabetic ulcer of left foot associated with  type 2 diabetes mellitus 2017    Diastolic dysfunction     Disease of thyroid gland     Elevated cholesterol     End stage renal disease     Essential hypertension     GERD (gastroesophageal reflux disease)     History of transfusion     Hyperlipidemia     Iron deficiency anemia 2018    PAD (peripheral artery disease)     Renal failure     Type 2 diabetes mellitus with hyperglycemia, with long-term current use of insulin 2018     Past Surgical History:   Procedure Laterality Date    ABDOMINAL SURGERY      BACK SURGERY      Post spinal diskectomy, osteophytectomy in one lumbar interspace    CATARACT EXTRACTION      Left      SECTION      DENTAL PROCEDURE      ENDOSCOPY      FRACTURE SURGERY      right leg    HYSTERECTOMY      INCISION AND DRAINAGE LEG Left 4/15/2017    Procedure: INCISION AND DRAINAGE LOWER EXTREMITY;  Surgeon: Basilio Morris MD;  Location: [facility] OR;  Service:     INCISION AND DRAINAGE LEG Left 2017    Procedure: Irrigation and Debridment abcess diabetic wound left foot with deep culture;  Surgeon: Basilio Morris MD;  Location: [facility] OR;  Service:     INSERTION PERITONEAL DIALYSIS CATHETER N/A 2021    Procedure: INSERTION PERITONEAL DIALYSIS CATHETER LAPAROSCOPIC;  Surgeon: Edy Glover MD;  Location: [facility] OR;  Service: General;  Laterality: N/A;    KNEE ARTHROSCOPY Right     ORIF TIBIA FRACTURE Right 2018    Procedure: TIBIAL  FRACTURE OPEN REDUCTION INTERNAL FIXATION;  Surgeon: Jung Barragan MD;  Location: [facility] OR;  Service: Orthopedics    TOE AMPUTATION Right     5th    TUBAL ABDOMINAL LIGATION       Family History   Problem Relation Age of Onset    Heart disease Mother     Cancer Mother     COPD Mother     Diabetes Other     Depression Other      Social History     Socioeconomic History    Marital status:    Tobacco Use    Smoking status: Never    Smokeless tobacco: Never   Vaping Use    Vaping status: Never Used   Substance and  Sexual Activity    Alcohol use: No    Drug use: No    Sexual activity: Defer     Allergies   Allergen Reactions    Penicillins Hives and GI Intolerance     Patient has received cefazolin, ceftriaxone and cefepime during past admissions.    Codeine Hives, Rash and GI Intolerance     Pt tolerates [place] @ home    Sulfa Antibiotics Hives, Rash and GI Intolerance     NAUSEA TOO     Prior to Admission medications    Medication Sig Start Date End Date Taking? Authorizing Provider   amLODIPine (NORVASC) 5 MG tablet Take 1 tablet by mouth 2 (Two) Times a Day.    Heike Potter MD   aspirin 81 MG EC tablet Take 1 tablet by mouth Daily.    Heike Potter MD   atorvastatin (LIPITOR) 80 MG tablet Take 1 tablet by mouth Every Night. 7/10/22   Mark Crawford DO   FLUoxetine (PROzac) 40 MG capsule Take 1 capsule by mouth Every Morning.  Patient not taking: Reported on 10/24/2023    Heike Potter MD   fluticasone (FLONASE) 50 MCG/ACT nasal spray 2 sprays into the nostril(s) as directed by provider Daily As Needed for Allergies.    Heike Potter MD   folic acid (FOLVITE) 1 MG tablet Take 1 tablet by mouth Daily.    Heike Potter MD   gabapentin (NEURONTIN) 300 MG capsule Take 1 capsule by mouth Daily.    Heike Potter MD   HYDROcodone-acetaminophen (NORCO)  MG per tablet Take 1 tablet by mouth 3 (Three) Times a Day As Needed for Moderate Pain.    Heike Potter MD   hydrOXYzine (ATARAX) 25 MG tablet Take 1 tablet by mouth 3 (Three) Times a Day As Needed for Anxiety. 11/9/21   Nelida Mcintyre MD   insulin degludec (TRESIBA FLEXTOUCH) 100 UNIT/ML solution pen-injector injection Inject 20 Units under the skin into the appropriate area as directed Daily.    Heike Potter MD   levothyroxine (SYNTHROID, LEVOTHROID) 50 MCG tablet Take 1 tablet by mouth Daily.    Heike Potter MD   loperamide (IMODIUM A-D) 2 MG tablet Take 1 tablet by mouth Daily As Needed  for Diarrhea.    Heike Potter MD   metoprolol succinate XL (TOPROL-XL) 25 MG 24 hr tablet Take 1 tablet by mouth Daily.    Heike Potter MD   ondansetron ODT (ZOFRAN-ODT) 4 MG disintegrating tablet Place 1 tablet on the tongue Daily As Needed for Nausea or Vomiting.    Heike Potter MD   pantoprazole (PROTONIX) 40 MG EC tablet Take 1 tablet by mouth Daily.    Heike Potter MD   rOPINIRole (REQUIP) 0.5 MG tablet Take 1 tablet by mouth 2 (Two) Times a Day.    Heike Potter MD   tiZANidine (ZANAFLEX) 4 MG tablet Take 1 tablet by mouth Every 8 (Eight) Hours As Needed for Muscle Spasms.    Heike Potter MD   traZODone (DESYREL) 50 MG tablet Take 1 tablet by mouth Every Night.    Heike Potter MD   vitamin D (ERGOCALCIFEROL) 1.25 MG (46217 UT) capsule capsule Take 1 capsule by mouth 1 (One) Time Per Week.    Heike Potter MD   insulin aspart (novoLOG) 100 UNIT/ML injection Inject 3 Units under the skin into the appropriate area as directed 3 (Three) Times a Day Before Meals.  4/29/24  Heike Potter MD   isosorbide mononitrate (IMDUR) 30 MG 24 hr tablet Take 1 tablet by mouth Daily for 30 days. 9/7/23 4/29/24  Yeison Braden MD   levothyroxine (SYNTHROID, LEVOTHROID) 50 MCG tablet Take 1 tablet by mouth Daily for 30 days. 9/7/23 4/29/24  Yeison Braden MD   lisinopril (PRINIVIL,ZESTRIL) 20 MG tablet Take 1 tablet by mouth Daily As Needed (high blood pressure).  4/29/24  Heike Potter MD   multivitamin (multivitamin) tablet tablet Take 1 tablet by mouth Daily. 7/10/22 4/29/24  Mark Crawford DO   traZODone (DESYREL) 50 MG tablet Take 1 tablet by mouth Every Night for 30 days. 9/7/23 4/29/24  Yeison Braden MD             Objective     Temp:  [97.1 °F (36.2 °C)-97.9 °F (36.6 °C)] 97.1 °F (36.2 °C)  Heart Rate:  [63-92] 76  Resp:  [18] 18  BP: ()/() 193/122  Neurological Exam  Mental Status  Awake and alert. Oriented to person,  place and time. Recent and remote memory are intact. Mild dysarthria present. Language is fluent with no aphasia. Attention and concentration are normal.    Cranial Nerves  CN III, IV, VI: Extraocular movements intact bilaterally. Normal lids and orbits bilaterally.  CN VII: Full and symmetric facial movement.  CN IX, X: Palate elevates symmetrically  CN XI: Shoulder shrug strength is normal.  CN XII: Tongue midline without atrophy or fasciculations.    Motor  Decreased muscle bulk throughout. Normal muscle tone. No abnormal involuntary movements.  Generalized weakness, no drift, no focal motor deficit.    Sensory  Light touch is normal in upper and lower extremities.     Coordination  Right: Finger-to-nose normal.Left: Finger-to-nose normal.    Gait    Not assessed.      Physical Exam  Constitutional:       General: She is awake.   HENT:      Head: Normocephalic.      Mouth/Throat:      Pharynx: Oropharynx is clear.   Eyes:      General: Lids are normal.      Extraocular Movements: Extraocular movements intact.   Cardiovascular:      Rate and Rhythm: Normal rate.   Pulmonary:      Effort: Pulmonary effort is normal. No respiratory distress.   Skin:     General: Skin is warm and dry.   Neurological:      Mental Status: She is alert.      Cranial Nerves: Dysarthria present.   Psychiatric:         Mood and Affect: Mood normal.         Hospital Meds:  Scheduled- amLODIPine, 10 mg, Oral, Q24H  cefepime, 1,000 mg, Intravenous, Q24H  heparin (porcine), 5,000 Units, Subcutaneous, Q8H  insulin lispro, 2-7 Units, Subcutaneous, 4x Daily AC & at Bedtime  lacosamide, 50 mg, Oral, Q12H  levothyroxine, 100 mcg, Oral, Q AM  metroNIDAZOLE, 500 mg, Intravenous, Q8H  sodium chloride, 10 mL, Intravenous, Q12H      Infusions-     PRNs-   senna-docusate sodium **AND** polyethylene glycol **AND** bisacodyl **AND** bisacodyl    dextrose    dextrose    glucagon (human recombinant)    hydrALAZINE    HYDROcodone-acetaminophen    LORazepam     nitroglycerin    [COMPLETED] Insert Peripheral IV **AND** sodium chloride    sodium chloride    sodium chloride    tiZANidine      Results Reviewed:  I have personally reviewed current labs, radiology, and data.    Lab Results   Component Value Date    HGBA1C 6.40 (H) 04/28/2024     Lab Results   Component Value Date    WBC 4.93 04/28/2024    HGB 10.3 (L) 04/28/2024    HCT 32.7 (L) 04/28/2024    .0 (H) 04/28/2024     04/28/2024      Lab Results   Component Value Date    GLUCOSE 153 (H) 04/28/2024    BUN 31 (H) 04/28/2024    CREATININE 5.00 (H) 04/28/2024    EGFRIFNONA 8 (L) 02/06/2022    EGFRIFAFRI  02/06/2022      Comment:      <15 Indicative of kidney failure.    BCR 6.2 (L) 04/28/2024    K 4.4 04/28/2024    CO2 23.7 04/28/2024    CALCIUM 7.3 (L) 04/28/2024    PROTENTOTREF 6.1 12/08/2020    ALBUMIN 2.9 (L) 04/28/2024    LABIL2 1.2 12/08/2020    AST 24 04/28/2024    ALT 15 04/28/2024      Lab Results   Component Value Date    CHOL 251 (H) 11/21/2022    CHOL 202 (H) 07/02/2022    CHOL 183 07/24/2019     Lab Results   Component Value Date    TRIG 141 11/21/2022    TRIG 80 07/02/2022    TRIG 186 (H) 07/24/2019     Lab Results   Component Value Date    HDL 74 (H) 11/21/2022    HDL 84 (H) 07/02/2022    HDL 56 07/24/2019     Lab Results   Component Value Date     (H) 11/21/2022     (H) 07/02/2022    LDL 90 07/24/2019      Imaging Results (Last 24 Hours)       Procedure Component Value Units Date/Time    MRI Brain Without Contrast [[id number]] Collected: 04/29/24 1437     Updated: 04/29/24 1440    Narrative:      EXAM:    MR Head Without Intravenous Contrast     EXAM DATE:    4/29/2024 1:00 PM     CLINICAL HISTORY:    Seizure protocol     TECHNIQUE:    Magnetic resonance images of the head/brain without intravenous  contrast in multiple planes.     COMPARISON:    No relevant prior studies available.     FINDINGS:    BRAIN AND EXTRA-AXIAL SPACES:  Abnormal T2 signal in the deep cerebral  white  matter is consistent with small vessel ischemic/degenerative  changes.  No hemorrhage.    BONES/JOINTS:  Unremarkable as visualized.  No acute fracture.    SINUSES:  Unremarkable as visualized.  No acute sinusitis.    MASTOID AIR CELLS:  Unremarkable as visualized.  No mastoid effusion.    ORBITS:  Unremarkable as visualized.       Impression:        Small vessel ischemic/degenerative changes.        This report was finalized on 4/29/2024 2:38 PM by Dr. Harrison Biswas MD.       CT Chest Without Contrast Diagnostic [[id number]] Collected: 04/28/24 1913     Updated: 04/28/24 1915    Narrative:      Noncontrast CT chest     Indications: Dyspnea abdominal pain nausea vomiting chest pain and  altered mental status     Technique: Noncontrast CT images of the chest were obtained.  Limited  exposure control, adjustment of the MA and/or KV according to patient  size or use of an iterative reconstruction technique was utilized.     Findings CT chest:     No priors available.     The heart and mediastinum are normal.  There is no pericardial effusion.   There is no hilar or mediastinal lymphadenopathy.     Lungs are free from infiltrate and effusion.  There is patchy  atelectasis within the right middle lobe.  There is patchy atelectasis  in the region of the lingula.     The osseous structures are normal.       Impression:      Impression:     No acute findings     This report was finalized on 4/28/2024 7:13 PM by Chin Chowdhury MD.       CT Abdomen Pelvis Without Contrast [[id number]] Collected: 04/28/24 1910     Updated: 04/28/24 1913    Narrative:      Noncontrast CT abdomen pelvis     Indications: Chest pain altered mental status nausea and vomiting     Technique: Noncontrast CT images of the abdomen and pelvis were  obtained.  Limited exposure control, adjustment of the MA and/or KV  according to patient size or use of an iterative reconstruction  technique was utilized.     Findings CT abdomen and pelvis:     Comparison  is made to the prior study dated 12/14/2023.     The liver has a normal appearance.  There is no biliary dilation.  Small  amount of perihepatic ascites is present.     The spleen is normal.  There is a small amount of perisplenic ascites.     The pancreas is atrophic, otherwise normal.     The adrenal glands are normal.  The kidneys are atrophic, otherwise  normal.     There is no abdominal or retroperitoneal adenopathy.     There is diffuse wall thickening involving the sigmoid colon.  No  evidence of abscess .     There is a small amount of free fluid in the dependent pelvis.   Abdominal catheter is seen in place, possibly a peritoneal dialysis  catheter.     There is an L1 compression fracture, unchanged.       Impression:      Impression:  1.  Small amount of ascites likely related to peritoneal dialysis.  2.  Wall thickening of the sigmoid colon and rectum compatible with  colitis.     This report was finalized on 4/28/2024 7:11 PM by Chin Chowdhury MD.       CT Head Without Contrast [[id number]] Collected: 04/28/24 1906     Updated: 04/28/24 1909    Narrative:      Noncontrast CT brain     Indications: Dyspnea and nausea vomiting chest pain altered mental  status     Technique: Noncontrast CT images of the brain were obtained.  Limited  exposure control, adjustment of the MA and/or KV according to patient  size or use of an iterative reconstruction technique was utilized.     Findings CT brain:     Comparison is made to the prior study dated 4/15/2024.     There is no evidence of acute intracranial hemorrhage.  Ventricles are  normal in size and position.  Patchy and confluent areas of hypodensity  involve the periventricular deep white matter compatible with sequelae  of chronic small vessel ischemic change.  There is no mass-effect or  midline shift.  No abnormal extra-axial fluid collections are  demonstrated.     The bony calvarium is normal.       Impression:      Impression:     No CT evidence of an  acute intracranial process.     This report was finalized on 4/28/2024 7:07 PM by Chin Chowdhury MD.             Results for orders placed during the hospital encounter of 08/19/23    Adult Transthoracic Echo Complete W/ Cont if Necessary Per Protocol    Interpretation Summary    Left ventricular systolic function is normal. Left ventricular ejection fraction appears to be 56 - 60%.    Left ventricular diastolic function is consistent with (grade I) impaired relaxation.    Left atrial volume is mildly increased.    Moderate mitral valve stenosis is present.    Estimated right ventricular systolic pressure from tricuspid regurgitation is mildly elevated (35-45 mmHg).    Mild pulmonary hypertension is present.    There is no evidence of pericardial effusion.            Assessment/Plan:  64-year-old female PMHx significant for type 2 diabetes hypothyroidism, ESRD on PD, hypertension, hyperlipidemia, PAD, previous stroke was brought to the ED after she was found on the toilet not responding with some shaking.  On arrival to ED she was awake but confused and shortly after arrival had witnessed seizure activity and was given Ativan IV as well as Keppra load.  No history of seizure disorder.    CT head without any acute changes.  MRI brain without contrast negative for any recent ischemia.  Creatinine is 5.0, ammonia level normal.  .1.  UDS positive for opiates.  EEG showed nonspecific mild generalized slowing, no ongoing seizure.      -Discontinue Keppra  -Start lacosamide 50 mg p.o. every 12 hours  -Tele-Neurology will continue to follow  -Seizure precautions  -No driving/operating heavy machinery x 90 days per [place] state law    The case was discussed with the patient, and will discuss with primary team.    Thank you for the consultation, please feel free to call with any questions.    Helder Cardoso, APRN  April 29, 2024  14:47 EDT    Verbal consent taken.  Patient agreeable to be seen via telemedicine.  Dr. Flor present during encounter via telemedicine.    This was an audio and video enabled telemedicine encounter.      Please note that portions of this note were completed with a voice recognition program. Efforts were made to edit dictation, but occasionally words are mistranscribed.

## 2024-04-29 NOTE — PLAN OF CARE
Goal Outcome Evaluation:  Plan of Care Reviewed With: patient        Progress: no change  Outcome Evaluation: Pt being transferred to [address] [place] [address] 318. Report called. Pt resting in bed, BP elevated PRN medication given.

## 2024-04-29 NOTE — PROGRESS NOTES
Pharmacy was consulted to dose keppra for seizure-like activity. Based on an estimated CrCl of 9mL/min, on peritoneal dialysis, a keppra dose of 500mg q24hr has been ordered per the dosing recommendations in [employer]. Thank you for the consult.     Thank you,   Marcelino Butt, PharmD  00:34 EDT

## 2024-04-29 NOTE — PLAN OF CARE
Goal Outcome Evaluation:           Progress: no change  Outcome Evaluation: Pt admitted to 3S this shift.  Pt is alert and oriented with intermittent confusion.  Dialysis nurse to begin PD. VSS

## 2024-04-29 NOTE — CASE MANAGEMENT/SOCIAL WORK
Discharge Planning Assessment   [place]     Patient Name: Reny Elena  MRN: [id number]  Today's Date: 4/29/2024    Admit Date: 4/28/2024    Plan: Spoke with patient at bedside. [facility] Other [phone] and plans to return at discharge. Patient has no POA or Living Will. Patient uses BSC and rolling walker from unknown supplier. Patient is requesting a hospital bed at discharge. Patient has no oxygen and receives HH unknown provider. Patients PCP Susan Stephens and pharmacy [facility]. Patients family will transport home at discharge.   Discharge Needs Assessment       Row Name 04/29/24 1053       Living Environment    People in Home significant other    Name(s) of People in Home [facility] Significant Other [phone]    Potentially Unsafe Housing Conditions none    In the past 12 months has the electric, gas, oil, or water company threatened to shut off services in your home? No    Primary Care Provided by self;spouse/significant other    Provides Primary Care For no one, unable/limited ability to care for self    Family Caregiver if Needed significant other    Family Caregiver Names [facility] Significant Other [phone]    Quality of Family Relationships helpful;involved;supportive    Able to Return to Prior Arrangements yes       Resource/Environmental Concerns    Resource/Environmental Concerns none    Transportation Concerns none       Transportation Needs    In the past 12 months, has lack of transportation kept you from medical appointments or from getting medications? no    In the past 12 months, has lack of transportation kept you from meetings, work, or from getting things needed for daily living? No       Food Insecurity    Within the past 12 months, you worried that your food would run out before you got the money to buy more. Never true    Within the past 12 months, the food you bought  just didn't last and you didn't have money to get more. Never true       Transition Planning    Patient/Family Anticipates Transition to home with family    Patient/Family Anticipated Services at Transition none    Transportation Anticipated family or friend will provide       Discharge Needs Assessment    Readmission Within the Last 30 Days no previous admission in last 30 days    Equipment Currently Used at Home commode;walker, rolling    Concerns to be Addressed discharge planning    Anticipated Changes Related to Illness none    Equipment Needed After Discharge hospital bed                   Discharge Plan       Row Name 04/29/24 4581       Plan    Plan Spoke with patient at bedside. [facility] Significant Other [phone] and plans to return at discharge. Patient has no POA or Living Will. Patient uses BSC and rolling walker from unknown supplier. Patient is requesting a hospital bed at discharge. Patient has no oxygen and receives HH unknown provider. Patients PCP Susan Stephens and pharmacy [facility]. Patients family will transport home at discharge.    Patient/Family in Agreement with Plan yes                  Continued Care and Services - Admitted Since 4/28/2024    No active coordination exists for this encounter.          Demographic Summary       Row Name 04/29/24 105       General Information    Admission Type inpatient    Arrived From home    Referral Source emergency department    Reason for Consult discharge planning    Preferred Language English             Lluvia Flores

## 2024-04-29 NOTE — PROGRESS NOTES
Pharmacy was consulted to dose cefepime for colitis. Based on an estimated CrCl of 9mL/min, on peritoneal dialysis, an extended infusion cefepime dose of 1g q24hr has been ordered. Thank you for the consult.     Thank you,   Marcelino Butt, PharmD  00:11 EDT

## 2024-04-29 NOTE — PROGRESS NOTES
"Nephrology Progress Note      Subjective     Patient patient is known to have ESRD on peritoneal dialysis.  She has been admitted after having seizures like activity for further workup.  Patient also has a history of type 2 diabetes mellitus.  Patient stated she has been doing well with the home peritoneal dialysis.  Denied any chest pain or shortness of breath    Objective       Vital signs :     Temp:  [97.9 °F (36.6 °C)] 97.9 °F (36.6 °C)  Heart Rate:  [63-92] 73  Resp:  [18] 18  BP: ()/() 173/93    Intake/Output                   04/28/24 0701 - 04/29/24 0700     [id number] 6156-7551 Total              Intake    Total Intake -- -- --       Output    Urine  --  0 0    Total Output -- 0 0             Physical Exam:    General Appearance : alert, pleasant, appears stated age, cooperative and oriented  Lungs : clear to auscultation, respirations regular  Heart :  regular rhythm & normal rate, normal S1, S2 and no murmur, no rub  Abdomen : Soft, nondistended  Extremities : No-ESRD edema,   Neurologic :   orientated to person, place, time and situation, Grossly no focal deficits        Laboratory Data :     Albumin Albumin   Date Value Ref Range Status   04/28/2024 2.9 (L) 3.5 - 5.2 g/dL Final   04/28/2024 3.5 3.5 - 5.2 g/dL Final      Magnesium Magnesium   Date Value Ref Range Status   04/28/2024 1.9 1.6 - 2.4 mg/dL Final          PTH               No results found for: \"PTH\"    CBC and coagulation:  Results from last 7 days   Lab Units 04/28/24 2338 04/28/24  1558   PROCALCITONIN ng/mL  --  0.45*   LACTATE mmol/L  --  1.2   SED RATE mm/hr  --  22   CRP mg/dL  --  <0.30   WBC 10*3/mm3 4.93 5.33   HEMOGLOBIN g/dL 10.3* 11.5*   HEMATOCRIT % 32.7* 37.3   MCV fL 100.0* 100.3*   MCHC g/dL 31.5 30.8*   PLATELETS 10*3/mm3 148 169   INR   --  0.94     Acid/base balance:      Renal and electrolytes:    Results from last 7 days   Lab Units 04/28/24 2338 04/28/24  1558   SODIUM mmol/L 140 142   POTASSIUM mmol/L 4.4 " 4.1   MAGNESIUM mg/dL  --  1.9   CHLORIDE mmol/L 104 100   CO2 mmol/L 23.7 25.7   BUN mg/dL 31* 32*   CREATININE mg/dL 5.00* 5.07*   CALCIUM mg/dL 7.3* 8.1*     Estimated Creatinine Clearance: 9 mL/min (A) (by C-G formula based on SCr of 5 mg/dL (H)).  @GFRCG:3@   Liver and pancreatic function:  Results from last 7 days   Lab Units 04/28/24  2338 04/28/24  1558   ALBUMIN g/dL 2.9* 3.5   BILIRUBIN mg/dL 0.2 0.3   ALK PHOS U/L 86 110   AST (SGOT) U/L 24 28   ALT (SGPT) U/L 15 18   AMMONIA umol/L 20  --          Cardiac:  Results from last 7 days   Lab Units 04/28/24  1558   PROBNP pg/mL 16,391.0*     Liver and pancreatic function:  Results from last 7 days   Lab Units 04/28/24 2338 04/28/24  1558   ALBUMIN g/dL 2.9* 3.5   BILIRUBIN mg/dL 0.2 0.3   ALK PHOS U/L 86 110   AST (SGOT) U/L 24 28   ALT (SGPT) U/L 15 18   AMMONIA umol/L 20  --        Medications :     cefepime, 1,000 mg, Intravenous, Q24H  heparin (porcine), 5,000 Units, Subcutaneous, Q12H  insulin lispro, 2-7 Units, Subcutaneous, 4x Daily AC & at Bedtime  levETIRAcetam, 500 mg, Oral, Q24H  levothyroxine, 100 mcg, Oral, Q AM  metroNIDAZOLE, 500 mg, Intravenous, Q8H  sodium chloride, 10 mL, Intravenous, Q12H             Assessment & Plan     -ESRD on peritoneal dialysis  - Seizure-like activity  - Type 2 diabetes mellitus  - Anemia  - Secondary hyperparathyroidism    Will resume peritoneal dialysis with the home prescription, no clinical concerns of PD related peritonitis.      Nihcolas Arroyo MD  04/29/24  09:24 EDT

## 2024-04-29 NOTE — PROGRESS NOTES
Patient admitted earlier this morning for concern for new onset seizure.  On exam, patient awake, oriented.  BP elevated.  MRI brain negative for stroke.  Telemetry neuro following, started on lacosamide 50 mg every 12.  EEG ordered.  Continue current treatment plan, follow-up labs in a.m.    Electronically signed by Ari Rosa DO, 04/29/24, 5:02 PM EDT.

## 2024-04-30 ENCOUNTER — READMISSION MANAGEMENT (OUTPATIENT)
Dept: CALL CENTER | Facility: HOSPITAL | Age: 64
End: 2024-04-30
Payer: MEDICARE

## 2024-04-30 VITALS
HEART RATE: 82 BPM | OXYGEN SATURATION: 98 % | TEMPERATURE: 97.8 F | DIASTOLIC BLOOD PRESSURE: 72 MMHG | RESPIRATION RATE: 18 BRPM | SYSTOLIC BLOOD PRESSURE: 139 MMHG | BODY MASS INDEX: 19.61 KG/M2 | HEIGHT: 65 IN | WEIGHT: 117.73 LBS

## 2024-04-30 PROBLEM — R56.9 SEIZURE: Status: ACTIVE | Noted: 2024-04-30

## 2024-04-30 LAB
ANION GAP SERPL CALCULATED.3IONS-SCNC: 11.5 MMOL/L (ref 5–15)
BASOPHILS # BLD AUTO: 0.02 10*3/MM3 (ref 0–0.2)
BASOPHILS NFR BLD AUTO: 0.5 % (ref 0–1.5)
BUN SERPL-MCNC: 30 MG/DL (ref 8–23)
BUN/CREAT SERPL: 6.4 (ref 7–25)
CALCIUM SPEC-SCNC: 7.4 MG/DL (ref 8.6–10.5)
CHLORIDE SERPL-SCNC: 104 MMOL/L (ref 98–107)
CO2 SERPL-SCNC: 19.5 MMOL/L (ref 22–29)
CREAT SERPL-MCNC: 4.7 MG/DL (ref 0.57–1)
DEPRECATED RDW RBC AUTO: 56.9 FL (ref 37–54)
EGFRCR SERPLBLD CKD-EPI 2021: 9.7 ML/MIN/1.73
EOSINOPHIL # BLD AUTO: 0.25 10*3/MM3 (ref 0–0.4)
EOSINOPHIL NFR BLD AUTO: 6.3 % (ref 0.3–6.2)
ERYTHROCYTE [DISTWIDTH] IN BLOOD BY AUTOMATED COUNT: 16.1 % (ref 12.3–15.4)
GLUCOSE BLDC GLUCOMTR-MCNC: 135 MG/DL (ref 70–130)
GLUCOSE BLDC GLUCOMTR-MCNC: 87 MG/DL (ref 70–130)
GLUCOSE SERPL-MCNC: 69 MG/DL (ref 65–99)
HCT VFR BLD AUTO: 30.4 % (ref 34–46.6)
HGB BLD-MCNC: 9.8 G/DL (ref 12–15.9)
IMM GRANULOCYTES # BLD AUTO: 0.01 10*3/MM3 (ref 0–0.05)
IMM GRANULOCYTES NFR BLD AUTO: 0.3 % (ref 0–0.5)
LYMPHOCYTES # BLD AUTO: 1.29 10*3/MM3 (ref 0.7–3.1)
LYMPHOCYTES NFR BLD AUTO: 32.3 % (ref 19.6–45.3)
MCH RBC QN AUTO: 31.7 PG (ref 26.6–33)
MCHC RBC AUTO-ENTMCNC: 32.2 G/DL (ref 31.5–35.7)
MCV RBC AUTO: 98.4 FL (ref 79–97)
MONOCYTES # BLD AUTO: 0.26 10*3/MM3 (ref 0.1–0.9)
MONOCYTES NFR BLD AUTO: 6.5 % (ref 5–12)
NEUTROPHILS NFR BLD AUTO: 2.16 10*3/MM3 (ref 1.7–7)
NEUTROPHILS NFR BLD AUTO: 54.1 % (ref 42.7–76)
NRBC BLD AUTO-RTO: 0 /100 WBC (ref 0–0.2)
PLATELET # BLD AUTO: 86 10*3/MM3 (ref 140–450)
PMV BLD AUTO: 12.9 FL (ref 6–12)
POTASSIUM SERPL-SCNC: 4.6 MMOL/L (ref 3.5–5.2)
RBC # BLD AUTO: 3.09 10*6/MM3 (ref 3.77–5.28)
SODIUM SERPL-SCNC: 135 MMOL/L (ref 136–145)
WBC NRBC COR # BLD AUTO: 3.99 10*3/MM3 (ref 3.4–10.8)

## 2024-04-30 PROCEDURE — 85025 COMPLETE CBC W/AUTO DIFF WBC: CPT | Performed by: STUDENT IN AN ORGANIZED HEALTH CARE EDUCATION/TRAINING PROGRAM

## 2024-04-30 PROCEDURE — 97166 OT EVAL MOD COMPLEX 45 MIN: CPT

## 2024-04-30 PROCEDURE — 25010000002 METRONIDAZOLE 500 MG/100ML SOLUTION: Performed by: HOSPITALIST

## 2024-04-30 PROCEDURE — 99239 HOSP IP/OBS DSCHRG MGMT >30: CPT | Performed by: STUDENT IN AN ORGANIZED HEALTH CARE EDUCATION/TRAINING PROGRAM

## 2024-04-30 PROCEDURE — 25010000002 HEPARIN (PORCINE) PER 1000 UNITS: Performed by: STUDENT IN AN ORGANIZED HEALTH CARE EDUCATION/TRAINING PROGRAM

## 2024-04-30 PROCEDURE — G0378 HOSPITAL OBSERVATION PER HR: HCPCS

## 2024-04-30 PROCEDURE — 96372 THER/PROPH/DIAG INJ SC/IM: CPT

## 2024-04-30 PROCEDURE — 80048 BASIC METABOLIC PNL TOTAL CA: CPT | Performed by: STUDENT IN AN ORGANIZED HEALTH CARE EDUCATION/TRAINING PROGRAM

## 2024-04-30 PROCEDURE — 99232 SBSQ HOSP IP/OBS MODERATE 35: CPT | Performed by: NURSE PRACTITIONER

## 2024-04-30 PROCEDURE — 82948 REAGENT STRIP/BLOOD GLUCOSE: CPT

## 2024-04-30 RX ORDER — LACOSAMIDE 50 MG/1
50 TABLET ORAL EVERY 12 HOURS SCHEDULED
Qty: 60 TABLET | Refills: 0 | Status: SHIPPED | OUTPATIENT
Start: 2024-04-30 | End: 2024-05-30

## 2024-04-30 RX ORDER — INSULIN ASPART INJECTION 100 [IU]/ML
2-7 INJECTION, SOLUTION SUBCUTANEOUS
Qty: 10 ML | Refills: 0 | Status: SHIPPED | OUTPATIENT
Start: 2024-04-30 | End: 2024-05-30

## 2024-04-30 RX ORDER — NIFEDIPINE 90 MG/1
90 TABLET, EXTENDED RELEASE ORAL DAILY
Qty: 30 TABLET | Refills: 0 | Status: SHIPPED | OUTPATIENT
Start: 2024-04-30 | End: 2024-05-30

## 2024-04-30 RX ORDER — METRONIDAZOLE 500 MG/1
500 TABLET ORAL 3 TIMES DAILY
Qty: 15 TABLET | Refills: 0 | Status: SHIPPED | OUTPATIENT
Start: 2024-04-30 | End: 2024-05-05

## 2024-04-30 RX ORDER — BLOOD SUGAR DIAGNOSTIC
STRIP MISCELLANEOUS
Qty: 100 EACH | Refills: 12 | Status: SHIPPED | OUTPATIENT
Start: 2024-04-30

## 2024-04-30 RX ORDER — LANCETS 30 GAUGE
EACH MISCELLANEOUS
Qty: 100 EACH | Refills: 12 | Status: SHIPPED | OUTPATIENT
Start: 2024-04-30

## 2024-04-30 RX ORDER — CARVEDILOL 6.25 MG/1
6.25 TABLET ORAL 2 TIMES DAILY WITH MEALS
Qty: 60 TABLET | Refills: 0 | Status: SHIPPED | OUTPATIENT
Start: 2024-04-30

## 2024-04-30 RX ORDER — BLOOD-GLUCOSE METER
KIT MISCELLANEOUS
Qty: 1 EACH | Refills: 0 | Status: SHIPPED | OUTPATIENT
Start: 2024-04-30

## 2024-04-30 RX ORDER — CIPROFLOXACIN 500 MG/1
500 TABLET, FILM COATED ORAL DAILY
Qty: 4 TABLET | Refills: 0 | Status: SHIPPED | OUTPATIENT
Start: 2024-04-30 | End: 2024-04-30

## 2024-04-30 RX ORDER — LEVOTHYROXINE SODIUM 0.1 MG/1
100 TABLET ORAL
Qty: 30 TABLET | Refills: 0 | Status: SHIPPED | OUTPATIENT
Start: 2024-05-01

## 2024-04-30 RX ORDER — CEFDINIR 300 MG/1
300 CAPSULE ORAL
Qty: 2 CAPSULE | Refills: 0 | Status: SHIPPED | OUTPATIENT
Start: 2024-04-30

## 2024-04-30 RX ADMIN — Medication 10 ML: at 09:06

## 2024-04-30 RX ADMIN — Medication 1 MG: at 09:04

## 2024-04-30 RX ADMIN — LACOSAMIDE 50 MG: 50 TABLET, FILM COATED ORAL at 09:04

## 2024-04-30 RX ADMIN — ROPINIROLE HYDROCHLORIDE 0.5 MG: 0.25 TABLET, FILM COATED ORAL at 09:04

## 2024-04-30 RX ADMIN — HEPARIN SODIUM 5000 UNITS: 5000 INJECTION INTRAVENOUS; SUBCUTANEOUS at 05:34

## 2024-04-30 RX ADMIN — TIZANIDINE 4 MG: 4 TABLET ORAL at 09:11

## 2024-04-30 RX ADMIN — PANTOPRAZOLE SODIUM 40 MG: 40 TABLET, DELAYED RELEASE ORAL at 09:04

## 2024-04-30 RX ADMIN — LEVOTHYROXINE SODIUM 100 MCG: 100 TABLET ORAL at 05:34

## 2024-04-30 RX ADMIN — METRONIDAZOLE 500 MG: 500 INJECTION, SOLUTION INTRAVENOUS at 04:13

## 2024-04-30 RX ADMIN — CARVEDILOL 6.25 MG: 6.25 TABLET, FILM COATED ORAL at 09:04

## 2024-04-30 RX ADMIN — NIFEDIPINE 90 MG: 30 TABLET, EXTENDED RELEASE ORAL at 09:04

## 2024-04-30 NOTE — DISCHARGE SUMMARY
[facility] HOSPITALISTS DISCHARGE SUMMARY    Patient Identification:  Name:  Reny Elena  Age:  64 y.o.  Sex:  female  :  1960  MRN:  [id number]  Visit Number:  [id number]    Date of Admission: 2024  Date of Discharge:  2024     PCP: Susan Stephens APRN    DISCHARGE DIAGNOSIS  #New onset seizure disorder  #Acute metabolic encephalopathy  #ESRD on PD  #Essential hypertension  #Hyperlipidemia  #Type 2 diabetes mellitus  #Hypothyroidism  #Peripheral arterial disease    CONSULTS   Neurology  Nephrology    PROCEDURES PERFORMED  none    HOSPITAL COURSE  Patient is a 64 y.o. female presented to [facility] complaining of cephalopathy with reports of tonic-clonic activity.  Please see the admitting history and physical for further details.  Patient was initially loaded on Keppra and placed on rapid EEG.  No seizure burden noted on rapid EEG.  Neurology was consulted and ultimately discontinue Keppra, started patient on Vimpat.  Workup also noted sigmoid colitis and she was started on antibiotic therapy.  Nephrology was consulted for inpatient PD assistance.    Patient's hospital course was short and had no further issues or concerns regarding seizure activity.  MRI of the brain was negative.  Neurology recommended continuing Vimpat twice daily with outpatient neurology follow-up as noted below.  At the time of discharge, patient was alert and orient x 4, had no acute complaints and was anxious for discharge home.  She was instructed follow-up with PCP and subspecialties as noted below.    VITAL SIGNS:  Temp:  [97.1 °F (36.2 °C)-98 °F (36.7 °C)] 97.8 °F (36.6 °C)  Heart Rate:  [63-82] 82  Resp:  [16-18] 18  BP: ()/() 139/72  SpO2:  [95 %-99 %] 98 %  on   ;   Device (Oxygen Therapy): room air    Body mass index is 19.59 kg/m².  Wt Readings from Last 3 Encounters:   24 53.4 kg (117 lb 11.6 oz)   04/15/24 51.3 kg (113 lb 1.5 oz)   23 51.3 kg (113 lb)        PHYSICAL EXAM:  Constitutional:  Well-developed and well-nourished.  No respiratory distress.      HENT:  Head:  Normocephalic and atraumatic.  Mouth:  Moist mucous membranes.  Eyes:  Conjunctivae and EOM are normal.  No scleral icterus.    Neck:  Neck supple.  No JVD present.    Cardiovascular:  Normal rate, regular rhythm and normal heart sounds with no murmur.  Pulmonary/Chest:  No respiratory distress, no wheezes, no crackles, with normal breath sounds and good air movement.  Abdominal:  Soft. No distension and no tenderness. PD cath site clean  Musculoskeletal:  No tenderness, and no deformity.  No red or swollen joints anywhere.    Neurological:  Alert and oriented to person, place, and time.  No cranial nerve deficit.    Skin:  Skin is warm and dry. No rash noted. No pallor.   Peripheral vascular: no clubbing, no cyanosis, no edema.    DISCHARGE DISPOSITION   Stable    DISCHARGE MEDICATIONS:     Discharge Medications        New Medications        Instructions Start Date   carvedilol 6.25 MG tablet  Commonly known as: COREG   6.25 mg, Oral, 2 Times Daily With Meals      ciprofloxacin 500 MG tablet  Commonly known as: Cipro   500 mg, Oral, Daily      Insulin Lispro 100 UNIT/ML injection  Commonly known as: humaLOG   2-7 Units, Subcutaneous, 4 Times Daily Before Meals & Nightly      lacosamide 50 MG tablet tablet  Commonly known as: VIMPAT   50 mg, Oral, Every 12 Hours Scheduled      metroNIDAZOLE 500 MG tablet  Commonly known as: FLAGYL   500 mg, Oral, 3 Times Daily      NIFEdipine XL 90 MG 24 hr tablet  Commonly known as: PROCARDIA XL   90 mg, Oral, Daily             Continue These Medications        Instructions Start Date   atorvastatin 80 MG tablet  Commonly known as: LIPITOR   80 mg, Oral, Nightly      fluticasone 50 MCG/ACT nasal spray  Commonly known as: FLONASE   2 sprays, Nasal, Daily PRN      folic acid 1 MG tablet  Commonly known as: FOLVITE   1 mg, Oral, Daily      HYDROcodone-acetaminophen   MG per tablet  Commonly known as: NORCO   1 tablet, Oral, 3 Times Daily PRN      hydrOXYzine 25 MG tablet  Commonly known as: ATARAX   25 mg, Oral, 3 Times Daily PRN      levothyroxine 50 MCG tablet  Commonly known as: SYNTHROID, LEVOTHROID   50 mcg, Oral, Daily      loperamide 2 MG tablet  Commonly known as: IMODIUM A-D   2 mg, Oral, Daily PRN      ondansetron ODT 4 MG disintegrating tablet  Commonly known as: ZOFRAN-ODT   4 mg, Translingual, Daily PRN      pantoprazole 40 MG EC tablet  Commonly known as: PROTONIX   40 mg, Oral, Daily      rOPINIRole 0.5 MG tablet  Commonly known as: REQUIP   0.5 mg, Oral, 2 Times Daily      tiZANidine 4 MG tablet  Commonly known as: ZANAFLEX   4 mg, Oral, Every 6 Hours PRN      traZODone 50 MG tablet  Commonly known as: DESYREL   50 mg, Oral, Nightly      vitamin D 1.25 MG (90460 UT) capsule capsule  Commonly known as: ERGOCALCIFEROL   50,000 Units, Oral, Weekly             Stop These Medications      insulin degludec 100 UNIT/ML solution pen-injector injection  Commonly known as: TRESIBA FLEXTOUCH     metoprolol succinate XL 25 MG 24 hr tablet  Commonly known as: TOPROL-XL                Additional Instructions for the Follow-ups that You Need to Schedule       Ambulatory Referral to Home Health (Hospital)   As directed      Face to Face Visit Date: 4/30/2024   Follow-up provider for Plan of Care?: I treated the patient in an acute care facility and will not continue treatment after discharge.   Follow-up provider: GIA JETT [[id number]]   Reason/Clinical Findings: a/c debility, new onset seizure   Describe mobility limitations that make leaving home difficult: a/c debility, new onset seizure   Nursing/Therapeutic Services Requested: Physical Therapy Occupational Therapy   PT orders: Total joint pathway Strengthening Therapeutic exercise   Occupational orders: Activities of daily living Energy conservation Strengthening   Frequency: 1 Week 1        Discharge Follow-up  with PCP   As directed       Currently Documented PCP:    Susan Stephens APRN    PCP Phone Number:    [phone]     [facility]        Discharge Follow-up with Specialty: nephrology   As directed      Specialty: nephrology   Follow Up Details: as scheduled- keep PD home schedule        Discharge Follow-up with Specialty: neuro clinic- [facility] in [place]; 2 Weeks   As directed      Specialty: neuro clinic- [facility] in [place]   Follow Up: 2 Weeks   Follow Up Details: seizure-new onset               Follow-up Information       Susan Stephens APRN .    Specialty: Nurse Practitioner  [facility]  Contact information:  [address] [place] [id number]  [phone]                              TEST  RESULTS PENDING AT DISCHARGE  Pending Labs       Order Current Status    Blood Culture - Blood, Arm, Left Preliminary result    Blood Culture - Blood, Hand, Left Preliminary result             CODE STATUS  Code Status and Medical Interventions:   Ordered at: 04/28/24 2224     Code Status (Patient has no pulse and is not breathing):    CPR (Attempt to Resuscitate)     Medical Interventions (Patient has pulse or is breathing):    Full Support       Ari Rosa DO  [facility]  04/30/24  11:12 EDT    Please note that this discharge summary required more than 30 minutes to complete.

## 2024-04-30 NOTE — CASE MANAGEMENT/SOCIAL WORK
Discharge Planning Assessment  [facility]     Patient Name: Reny Elena  MRN: [id number]  Today's Date: 4/30/2024    Admit Date: 4/28/2024    Plan: SS noted Physician order for home health.  SS spoke with [facility] at [phone] per [facility] who stated agency will not accept pt back.  SS made referral to [facility] at [phone] and spoke with Geraldine and faxed referral to [phone].     Discharge Plan       Row Name 04/30/24 1215       Plan    Plan SS noted Physician order for home health.  SS spoke with [facility] at [phone] per [facility] who stated agency will not accept pt back.  SS made referral to [facility] at [phone] and spoke with Geraldine and faxed referral to [phone].                  Continued Care and Services - Admitted Since 4/28/2024    No active coordination exists for this encounter.       Expected Discharge Date and Time       Expected Discharge Date Expected Discharge Time    Apr 30, 2024             ADRIAN StringerW

## 2024-04-30 NOTE — PLAN OF CARE
Goal Outcome Evaluation:  Patient has rested well in bed this shift. A&Ox4. VSS. No visible s/s of acute distress noted at this time. No requests or complaints at this time. Plan of care ongoing.

## 2024-04-30 NOTE — DISCHARGE INSTR - DIET
Diet: Cardiac, Diabetic, Renal; Healthy Heart (2-3 Na+); Consistent Carbohydrate; Low Sodium (2-3g), Low Potassium, Low Phosphorus; Fluid Consistency: Thin

## 2024-04-30 NOTE — PROGRESS NOTES
"Nephrology Progress Note      Subjective   Feels better, she stated she has had nausea and vomiting that has improved a lot.  No chest pain shortness of breath or abdominal pain    Objective       Vital signs :     Temp:  [97.1 °F (36.2 °C)-98 °F (36.7 °C)] 97.8 °F (36.6 °C)  Heart Rate:  [63-82] 82  Resp:  [16-18] 18  BP: ()/() 139/72    Intake/Output                         04/28/24 0701 - 04/29/24 0700 04/29/24 0701 - 04/30/24 0700     [id number] 2120-4597 Total 3226-1674 2586-9952 Total                 Intake    Total Intake -- -- -- -- -- --       Output    Urine  --  0 0  0  400 400    Total Output -- 0 0 0 400 400             Physical Exam:    General Appearance : alert, pleasant, appears stated age, cooperative and oriented  Lungs : clear to auscultation, respirations regular  Heart :  regular rhythm & normal rate, normal S1, S2 and no murmur, no rub  Abdomen : Soft, nondistended  Extremities : No-ESRD edema,   Neurologic :   orientated to person, place, time and situation, Grossly no focal deficits        Laboratory Data :     Albumin Albumin   Date Value Ref Range Status   04/28/2024 2.9 (L) 3.5 - 5.2 g/dL Final   04/28/2024 3.5 3.5 - 5.2 g/dL Final      Magnesium Magnesium   Date Value Ref Range Status   04/28/2024 1.9 1.6 - 2.4 mg/dL Final   04/28/2024 1.9 1.6 - 2.4 mg/dL Final          PTH               No results found for: \"PTH\"    CBC and coagulation:  Results from last 7 days   Lab Units 04/30/24  0120 04/28/24  2338 04/28/24  1558   PROCALCITONIN ng/mL  --   --  0.45*   LACTATE mmol/L  --   --  1.2   SED RATE mm/hr  --   --  22   CRP mg/dL  --   --  <0.30   WBC 10*3/mm3 3.99 4.93 5.33   HEMOGLOBIN g/dL 9.8* 10.3* 11.5*   HEMATOCRIT % 30.4* 32.7* 37.3   MCV fL 98.4* 100.0* 100.3*   MCHC g/dL 32.2 31.5 30.8*   PLATELETS 10*3/mm3 86* 148 169   INR   --   --  0.94     Acid/base balance:      Renal and electrolytes:    Results from last 7 days   Lab Units 04/30/24  0120 04/28/24  2338 " 04/28/24  1558   SODIUM mmol/L 135* 140 142   POTASSIUM mmol/L 4.6 4.4 4.1   MAGNESIUM mg/dL  --  1.9 1.9   CHLORIDE mmol/L 104 104 100   CO2 mmol/L 19.5* 23.7 25.7   BUN mg/dL 30* 31* 32*   CREATININE mg/dL 4.70* 5.00* 5.07*   CALCIUM mg/dL 7.4* 7.3* 8.1*     Estimated Creatinine Clearance: 9.9 mL/min (A) (by C-G formula based on SCr of 4.7 mg/dL (H)).  @GFRCG:3@   Liver and pancreatic function:  Results from last 7 days   Lab Units 04/28/24  2338 04/28/24  1558   ALBUMIN g/dL 2.9* 3.5   BILIRUBIN mg/dL 0.2 0.3   ALK PHOS U/L 86 110   AST (SGOT) U/L 24 28   ALT (SGPT) U/L 15 18   AMMONIA umol/L 20  --          Cardiac:  Results from last 7 days   Lab Units 04/28/24  1558   PROBNP pg/mL 16,391.0*     Liver and pancreatic function:  Results from last 7 days   Lab Units 04/28/24  2338 04/28/24  1558   ALBUMIN g/dL 2.9* 3.5   BILIRUBIN mg/dL 0.2 0.3   ALK PHOS U/L 86 110   AST (SGOT) U/L 24 28   ALT (SGPT) U/L 15 18   AMMONIA umol/L 20  --        Medications :     atorvastatin, 80 mg, Oral, Nightly  carvedilol, 6.25 mg, Oral, BID With Meals  cefepime, 1,000 mg, Intravenous, Q24H  [START ON 5/2/2024] cholecalciferol, 50,000 Units, Oral, Weekly  folic acid, 1 mg, Oral, Daily  heparin (porcine), 5,000 Units, Subcutaneous, Q8H  insulin lispro, 2-7 Units, Subcutaneous, 4x Daily AC & at Bedtime  lacosamide, 50 mg, Oral, Q12H  levothyroxine, 100 mcg, Oral, Q AM  metroNIDAZOLE, 500 mg, Intravenous, Q8H  NIFEdipine CC, 90 mg, Oral, Q24H  pantoprazole, 40 mg, Oral, Daily  rOPINIRole, 0.5 mg, Oral, BID  sodium chloride, 10 mL, Intravenous, Q12H  traZODone, 50 mg, Oral, Nightly             Assessment & Plan     -ESRD on peritoneal dialysis  - Seizure-like activity  - Type 2 diabetes mellitus  - Anemia  - Secondary hyperparathyroidism    Patient had uneventful CCPD last night, will continue on current PD prescription.  Patient is stable from nephrology standpoint and can be discharged home      Nicholas Arroyo MD  04/30/24  08:06  EDT

## 2024-04-30 NOTE — DISCHARGE INSTR - APPOINTMENTS
Pt has an appointment with Susan Stephens for May 6th at 10:15 AM and awaiting  for Dr. [facility] to call with appointment.

## 2024-04-30 NOTE — PROGRESS NOTES
Neurology Progress Note       Chief Complaint:  Seizure    Subjective    Subjective     Subjective:  No acute events overnight.  Patient reports feeling greatly of improved and at her baseline.  No reports of any further seizure activity.  Anxious to go home.    Review of Systems   Constitutional:  Positive for fatigue. Negative for chills and fever.   Respiratory: Negative.     Cardiovascular: Negative.    Musculoskeletal:  Positive for arthralgias and myalgias.   Neurological:  Positive for weakness. Negative for facial asymmetry and headaches.   Psychiatric/Behavioral: Negative.              Objective    Objective      Temp:  [97.1 °F (36.2 °C)-98 °F (36.7 °C)] 97.8 °F (36.6 °C)  Heart Rate:  [63-82] 82  Resp:  [16-18] 18  BP: ()/() 139/72        Neurological Exam  Mental Status  Awake and alert. Oriented to person, place, time and situation. Recent and remote memory are intact. Mild dysarthria present. Language is fluent with no aphasia. Attention and concentration are normal.    Cranial Nerves  CN III, IV, VI: Extraocular movements intact bilaterally. Normal lids and orbits bilaterally.  CN VII: Full and symmetric facial movement.  CN IX, X: Palate elevates symmetrically  CN XII: Tongue midline without atrophy or fasciculations.    Motor  Decreased muscle bulk throughout. Normal muscle tone. No abnormal involuntary movements.  Generalized weakness, lower > upper.    Sensory  Light touch is normal in upper and lower extremities.     Coordination    No obvious dysmetria.    Gait    Not assessed.      Physical Exam  Vitals and nursing note reviewed.   Constitutional:       General: She is awake. She is not in acute distress.     Appearance: She is ill-appearing.   HENT:      Head: Normocephalic.      Mouth/Throat:      Mouth: Mucous membranes are moist.      Pharynx: Oropharynx is clear.   Eyes:      General: Lids are normal.      Extraocular Movements: Extraocular movements intact.   Cardiovascular:       Rate and Rhythm: Normal rate.   Pulmonary:      Effort: Pulmonary effort is normal. No respiratory distress.   Skin:     General: Skin is warm and dry.   Neurological:      Mental Status: She is alert.      Cranial Nerves: Dysarthria present.   Psychiatric:         Mood and Affect: Mood normal.         Behavior: Behavior normal.         Hospital Meds:  Scheduled- atorvastatin, 80 mg, Oral, Nightly  carvedilol, 6.25 mg, Oral, BID With Meals  cefepime, 1,000 mg, Intravenous, Q24H  [START ON 5/2/2024] cholecalciferol, 50,000 Units, Oral, Weekly  folic acid, 1 mg, Oral, Daily  heparin (porcine), 5,000 Units, Subcutaneous, Q8H  insulin lispro, 2-7 Units, Subcutaneous, 4x Daily AC & at Bedtime  lacosamide, 50 mg, Oral, Q12H  levothyroxine, 100 mcg, Oral, Q AM  metroNIDAZOLE, 500 mg, Intravenous, Q8H  NIFEdipine CC, 90 mg, Oral, Q24H  pantoprazole, 40 mg, Oral, Daily  rOPINIRole, 0.5 mg, Oral, BID  sodium chloride, 10 mL, Intravenous, Q12H  traZODone, 50 mg, Oral, Nightly      Infusions-     PRNs-   senna-docusate sodium **AND** polyethylene glycol **AND** bisacodyl **AND** bisacodyl    dextrose    dextrose    fluticasone    glucagon (human recombinant)    hydrALAZINE    HYDROcodone-acetaminophen    hydrOXYzine    loperamide    LORazepam    nitroglycerin    ondansetron ODT    [COMPLETED] Insert Peripheral IV **AND** sodium chloride    sodium chloride    sodium chloride    tiZANidine    Results Review:    I reviewed the patient's new clinical results.    Imaging Results (Last 24 Hours)       Procedure Component Value Units Date/Time    MRI Brain Without Contrast [[id number]] Collected: 04/29/24 1437     Updated: 04/29/24 1440    Narrative:      EXAM:    MR Head Without Intravenous Contrast     EXAM DATE:    4/29/2024 1:00 PM     CLINICAL HISTORY:    Seizure protocol     TECHNIQUE:    Magnetic resonance images of the head/brain without intravenous  contrast in multiple planes.     COMPARISON:    No relevant prior studies  available.     FINDINGS:    BRAIN AND EXTRA-AXIAL SPACES:  Abnormal T2 signal in the deep cerebral  white matter is consistent with small vessel ischemic/degenerative  changes.  No hemorrhage.    BONES/JOINTS:  Unremarkable as visualized.  No acute fracture.    SINUSES:  Unremarkable as visualized.  No acute sinusitis.    MASTOID AIR CELLS:  Unremarkable as visualized.  No mastoid effusion.    ORBITS:  Unremarkable as visualized.       Impression:        Small vessel ischemic/degenerative changes.        This report was finalized on 4/29/2024 2:38 PM by Dr. Harrison Biswas MD.             Results for orders placed during the hospital encounter of 08/19/23    Adult Transthoracic Echo Complete W/ Cont if Necessary Per Protocol    Interpretation Summary    Left ventricular systolic function is normal. Left ventricular ejection fraction appears to be 56 - 60%.    Left ventricular diastolic function is consistent with (grade I) impaired relaxation.    Left atrial volume is mildly increased.    Moderate mitral valve stenosis is present.    Estimated right ventricular systolic pressure from tricuspid regurgitation is mildly elevated (35-45 mmHg).    Mild pulmonary hypertension is present.    There is no evidence of pericardial effusion.    MRI Brain Without Contrast    Result Date: 4/29/2024    Small vessel ischemic/degenerative changes.   This report was finalized on 4/29/2024 2:38 PM by Dr. Harrison Biswas MD.      EEG    Result Date: 4/29/2024  Diffuse cerebral dysfunction mild degree, nonspecific No ongoing seizures are seen This report is transcribed using the Dragon dictation system.      CT Chest Without Contrast Diagnostic    Result Date: 4/28/2024  Impression:  No acute findings  This report was finalized on 4/28/2024 7:13 PM by Chin Chowdhury MD.      CT Abdomen Pelvis Without Contrast    Result Date: 4/28/2024  Impression: 1.  Small amount of ascites likely related to peritoneal dialysis. 2.  Wall thickening of  the sigmoid colon and rectum compatible with colitis.  This report was finalized on 4/28/2024 7:11 PM by Chin Chowdhury MD.      CT Head Without Contrast    Result Date: 4/28/2024  Impression:  No CT evidence of an acute intracranial process.  This report was finalized on 4/28/2024 7:07 PM by Chin Chowdhury MD.        Lab Results   Component Value Date    HGBA1C 6.40 (H) 04/28/2024      Lab Results   Component Value Date    CHOL 251 (H) 11/21/2022    TRIG 141 11/21/2022    HDL 74 (H) 11/21/2022     (H) 11/21/2022      Lab Results   Component Value Date    WBC 3.99 04/30/2024    HGB 9.8 (L) 04/30/2024    HCT 30.4 (L) 04/30/2024    MCV 98.4 (H) 04/30/2024    PLT 86 (L) 04/30/2024      Lab Results   Component Value Date    GLUCOSE 69 04/30/2024    BUN 30 (H) 04/30/2024    CREATININE 4.70 (H) 04/30/2024    EGFRIFNONA 8 (L) 02/06/2022    EGFRIFAFRI  02/06/2022      Comment:      <15 Indicative of kidney failure.    BCR 6.4 (L) 04/30/2024    K 4.6 04/30/2024    CO2 19.5 (L) 04/30/2024    CALCIUM 7.4 (L) 04/30/2024    PROTENTOTREF 6.1 12/08/2020    ALBUMIN 2.9 (L) 04/28/2024    LABIL2 1.2 12/08/2020    AST 24 04/28/2024    ALT 15 04/28/2024      Lab Results   Component Value Date    .100 (H) 04/28/2024     Lab Results   Component Value Date    ERCWDQNS66 492 08/19/2023     Lab Results   Component Value Date    FOLATE 9.03 08/20/2023             Assessment/Plan     Assessment/Plan:  64-year-old female PMHx significant for type 2 diabetes hypothyroidism, ESRD on PD, hypertension, hyperlipidemia, PAD, previous stroke was brought to the ED after she was found on the toilet not responding with some shaking.  On arrival to ED she was awake but confused and shortly after arrival had witnessed seizure activity and was given Ativan IV as well as Keppra load.  No history of seizure disorder.  Ceribell monitoring with 0% seizure burden.     CT head without any acute changes.  MRI brain without contrast negative  for any recent ischemia.  Creatinine is 5.0, ammonia level normal.  .1.  UDS positive for opiates.  EEG showed nonspecific mild generalized slowing, no ongoing seizure.    #Seizure, unclear etiology         -Continue lacosamide 50 mg p.o. 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 Rosa.  Tele-Neurology will sign off for now, please feel free to call with any questions/concerns.  Thank you again for the consult.    Helder Cardoso, DARIUS  04/30/24  11:43 EDT    This was an audio and video enabled telemedicine encounter.  Dr. Jalloh present for encounter via telemedicine.  Verbal consent taken.

## 2024-04-30 NOTE — PROGRESS NOTES
Malnutrition Severity Assessment      Patient meets criteria for : Severe Malnutrition  Malnutrition Type (Last 8 Hours)       Malnutrition Severity Assessment       Row Name 04/30/24 1136       Malnutrition Severity Assessment    Malnutrition Type Chronic Disease - Related Malnutrition      Row Name 04/30/24 1136       Insufficient Energy Intake     Insufficient Energy Intake Findings Severe    Insufficient Energy Intake  <75% of est. energy requirement for > or equal to 1 month      Row Name 04/30/24 1330       Unintentional Weight Loss     Unintentional Weight Loss Findings --  no data available      Row Name 04/30/24 1330       Muscle Loss    Loss of Muscle Mass Findings Severe    [place] Region Severe - deep hollowing/scooping, lack of muscle to touch, facial bones well defined    Clavicle Bone Region Severe - protruding prominent bone    Acromion Bone Region Severe - squared shoulders, bones, and acromion process protrusion prominent    Scapular Bone Region Severe - prominent bones, depressions easily visible between ribs, scapula, spine, shoulders    Dorsal Hand Region Severe - prominent depression    Patellar Region Severe - prominent bone, square looking, very little muscle definition    Anterior Thigh Region Severe - line/depression along thigh, obviously thin    Posterior Calf Region Severe - thin with very little definition/firmness      Row Name 04/30/24 1330       Fat Loss    Subcutaneous Fat Loss Findings Severe    Orbital Region  Severe - pronounced hollowness/depression, dark circles, loose saggy skin    Upper Arm Region Severe - mostly skin, very little space between folds, fingers touch    Thoracic & Lumbar Region Severe - ribs visible with prominent depressions, iliac crest very prominent      Row Name 04/30/24 1330       Criteria Met (Must meet criteria for severity in at least 2 of these categories: M Wasting, Fat Loss, Fluid, Secondary Signs, Wt. Status, Intake)    Patient meets criteria for   Severe Malnutrition

## 2024-04-30 NOTE — NURSING NOTE
Wound consult for discolored area to the RT shin and RT 3rd and 4th toes.   Toes present with deep purple discoloration and are  symmetrical. These are arterial ulcers. New order to paint with betadine daily  RT skin presents with a dry scab along the tibia. This is an arterial ulcer. Paint with betadine.

## 2024-04-30 NOTE — PLAN OF CARE
Pt discharged home with family. IV, telemetry box, and external catheter removed. All belongings sent with pt. No s/s of acute distress noted. VSS

## 2024-04-30 NOTE — THERAPY EVALUATION
Acute Care - Occupational Therapy Initial Evaluation  [facility]     Patient Name: Reny Elena  : 1960  MRN: [id number]  Today's Date: 2024             Admit Date: 2024       ICD-10-CM ICD-9-CM   1. Seizure  R56.9 780.39   2. Debility  R53.81 799.3   3. Seizure-like activity  R56.9 780.39   4. Stage 5 chronic kidney disease on chronic dialysis  N18.6 585.6    Z99.2 V45.11     Patient Active Problem List   Diagnosis    Tibia/fibula fracture    Iron deficiency anemia    Type 2 diabetes mellitus with hyperglycemia, with long-term current use of insulin    Wound of right ankle    Cellulitis    Metabolic encephalopathy    History of non-ST elevation myocardial infarction (NSTEMI)    HTN (hypertension)    CVA (cerebral vascular accident)    Chronic diastolic CHF (congestive heart failure)    Decubitus ulcer of coccyx, unspecified pressure ulcer stage    Depression    Expressive aphasia    Impaired mobility and ADLs    Hyperkalemia    Subdural hematoma    Severe malnutrition    Cerebrovascular accident (CVA), unspecified mechanism    PRES (posterior reversible encephalopathy syndrome)    Debility    Stage 5 chronic kidney disease on chronic dialysis    Seizure-like activity    Seizure     Past Medical History:   Diagnosis Date    Arthritis     Closed fracture of right fibula and tibia 2018    Depression     Diabetic ulcer of left foot associated with type 2 diabetes mellitus 2017    Diastolic dysfunction     Disease of thyroid gland     Elevated cholesterol     End stage renal disease     Essential hypertension     GERD (gastroesophageal reflux disease)     History of transfusion     Hyperlipidemia     Iron deficiency anemia 2018    PAD (peripheral artery disease)     Renal failure     Type 2 diabetes mellitus with hyperglycemia, with long-term current use of insulin 2018     Past Surgical History:   Procedure Laterality Date    ABDOMINAL SURGERY      BACK SURGERY      Post spinal  diskectomy, osteophytectomy in one lumbar interspace    CATARACT EXTRACTION      Left      SECTION      DENTAL PROCEDURE      ENDOSCOPY      FRACTURE SURGERY      right leg    HYSTERECTOMY      INCISION AND DRAINAGE LEG Left 4/15/2017    Procedure: INCISION AND DRAINAGE LOWER EXTREMITY;  Surgeon: Basilio Morris MD;  Location: [facility] OR;  Service:     INCISION AND DRAINAGE LEG Left 2017    Procedure: Irrigation and Debridment abcess diabetic wound left foot with deep culture;  Surgeon: Basilio Morris MD;  Location: [facility] OR;  Service:     INSERTION PERITONEAL DIALYSIS CATHETER N/A 2021    Procedure: INSERTION PERITONEAL DIALYSIS CATHETER LAPAROSCOPIC;  Surgeon: Edy Glover MD;  Location: [facility] OR;  Service: General;  Laterality: N/A;    KNEE ARTHROSCOPY Right     ORIF TIBIA FRACTURE Right 2018    Procedure: TIBIAL  FRACTURE OPEN REDUCTION INTERNAL FIXATION;  Surgeon: Jung Barragan MD;  Location: [facility] OR;  Service: Orthopedics    TOE AMPUTATION Right     5th    TUBAL ABDOMINAL LIGATION           OT ASSESSMENT FLOWSHEET (Last 12 Hours)       OT Evaluation and Treatment       Row Name 24 9839                   OT Time and Intention    Document Type evaluation  -KR        Mode of Treatment occupational therapy  -KR        Patient Effort adequate  -KR           Living Environment    Current Living Arrangements home  -KR        People in Home spouse  -KR        Primary Care Provided by self  -KR           Home Use of Assistive/Adaptive Equipment    Equipment Currently Used at Home wheelchair;walker, rolling  HC commode  -KR           Cognition    Affect/Mental Status (Cognition) WFL  -KR        Orientation Status (Cognition) oriented x 3  -KR        Follows Commands (Cognition) WFL  -KR           Range of Motion Comprehensive    Comment, General Range of Motion BUE WFL  -KR           Strength Comprehensive (MMT)    Comment, General Manual Muscle Testing (MMT) Assessment BUE 3/5   -KR           Activities of Daily Living    BADL Assessment/Intervention bathing;upper body dressing;lower body dressing;grooming;toileting  -KR           Bathing Assessment/Intervention    [place] Level (Bathing) bathing skills;moderate assist (50% patient effort)  -KR           Upper Body Dressing Assessment/Training    [place] Level (Upper Body Dressing) upper body dressing skills;minimum assist (75% patient effort)  -KR           Lower Body Dressing Assessment/Training    [place] Level (Lower Body Dressing) lower body dressing skills;moderate assist (50% patient effort)  -KR           Grooming Assessment/Training    [place] Level (Grooming) grooming skills;minimum assist (75% patient effort)  -KR           Toileting Assessment/Training    [place] Level (Toileting) toileting skills;maximum assist (25% patient effort)  -KR           Wound 04/29/24 1513 Right lower leg Abrasion    Wound - Properties Group Placement Date: 04/29/24  -DA Placement Time: 1513 -DA Present on Original Admission: Y  -DA Side: Right  -DA Orientation: lower  -DA Location: leg  -DA Primary Wound Type: Abrasion  -DA Wound Outcome: Other  -DA, scab from fall     Retired Wound - Properties Group Placement Date: 04/29/24  -DA Placement Time: 1513  -DA Present on Original Admission: Y  -DA Side: Right  -DA Orientation: lower  -DA Location: leg  -DA Primary Wound Type: Abrasion  -DA Wound Outcome: Other  -DA, scab from fall     Retired Wound - Properties Group Date first assessed: 04/29/24  -DA Time first assessed: 1513  -DA Present on Original Admission: Y  -DA Side: Right  -DA Location: leg  -DA Primary Wound Type: Abrasion  -DA Wound Outcome: Other  -DA, scab from fall        Wound 04/29/24 1514 Right anterior fourth toe    Wound - Properties Group Placement Date: 04/29/24  -DA Placement Time: 1514  -DA Present on Original Admission: Y  -DA Side: Right  -DA Orientation: anterior  -DA Location: fourth toe  -DA Wound  Outcome: Unknown  -DA    Retired Wound - Properties Group Placement Date: 04/29/24  -DA Placement Time: 1514  -DA Present on Original Admission: Y  -DA Side: Right  -DA Orientation: anterior  -DA Location: fourth toe  -DA Wound Outcome: Unknown  -DA    Retired Wound - Properties Group Date first assessed: 04/29/24  -DA Time first assessed: 1514 -DA Present on Original Admission: Y  -DA Side: Right  -DA Location: fourth toe  -DA Wound Outcome: Unknown  -DA       Plan of Care Review    Plan of Care Reviewed With patient  -KR        Progress improving  -KR           Therapy Assessment/Plan (OT)    Planned Therapy Interventions (OT) activity tolerance training;strengthening exercise  -KR           Therapy Plan Review/Discharge Plan (OT)    Anticipated Discharge Disposition (OT) home with assist  -KR           OT Goals    Strength Goal Selection (OT) strength, OT goal 1  -KR        Activity Tolerance Goal Selection (OT) activity tolerance, OT goal 1  -KR           Strength Goal 1 (OT)    Strength Goal 1 (OT) BUE increase x 1 to enhance mobility skills  -KR        Time Frame (Strength Goal 1, OT) by discharge  -KR            Activity Tolerance Goal 1 (OT)    Activity Tolerance Goal 1 (OT) Increase to enhance self care performance  -KR        Activity Level (Endurance Goal 1, OT) 15 min activity  -KR        Time Frame (Activity Tolerance Goal 1, OT) by discharge  -KR                  User Key  (r) = Recorded By, (t) = Taken By, (c) = Cosigned By      Initials Name Effective Dates    Helder Panda OT 06/16/21 -     Radha Rosado RN 09/13/23 -                            OT Recommendation and Plan  Planned Therapy Interventions (OT): activity tolerance training, strengthening exercise  Plan of Care Review  Plan of Care Reviewed With: patient  Progress: improving  Plan of Care Reviewed With: patient        Time Calculation:     Therapy Charges for Today       Code Description Service Date Service Provider Modifiers  Qty    [id number]  OT EVAL MOD COMPLEXITY 4 4/30/2024 Helder Cam, KORINA GO 1                 Helder Cam OT  4/30/2024

## 2024-05-01 RX ORDER — SYRINGE-NEEDLE,INSULIN,0.5 ML 28GX1/2"
1 SYRINGE, EMPTY DISPOSABLE MISCELLANEOUS 4 TIMES DAILY PRN
Qty: 100 EACH | Refills: 0 | Status: SHIPPED | OUTPATIENT
Start: 2024-05-01

## 2024-05-01 NOTE — OUTREACH NOTE
Prep Survey      Flowsheet Row Responses   [facility] facility [facility] from? [facility]   Is LACE score < 7 ? No   Eligibility Readm Mgmt   Discharge diagnosis Seizure like activity   Does the patient have one of the following disease processes/diagnoses(primary or secondary)? Other   Does the patient have Home health ordered? Yes   What is the Home health agency?  [facility]   Is there a DME ordered? No   Prep survey completed? Yes            VERONICA NICK - Registered Nurse

## 2024-05-02 NOTE — CASE MANAGEMENT/SOCIAL WORK
Discharge Planning Assessment   [place]     Patient Name: Reny Elena  MRN: [id number]  Today's Date: 5/2/2024    Admit Date: 4/28/2024    Plan:  noted Physician order for home health.  SS spoke with [facility] at [phone] per Kristin Cage who stated agency will not accept pt back.  SS made referral to [facility] at [phone] and spoke with Geraldine and faxed referral to [phone].     Discharge Plan       Row Name 05/02/24 0916       Plan    Final Discharge Disposition Code 06 - home with home health care    Final Note [facility] per Geraldine admitted pt.                  Continued Care and Services - Discharged on 4/30/2024 Admission date: 4/28/2024 - Discharge disposition: Home-Health Care [facility]      Home Medical Care Coordination complete.      Service Provider Request Status Selected Services Address Phone Fax Patient Preferred    [facility] - [facility] [address] [facility] [phone] --                  Expected Discharge Date and Time       Expected Discharge Date Expected Discharge Time    Apr 30, 2024         ADRIAN StringerW

## 2024-05-03 LAB
BACTERIA SPEC AEROBE CULT: NORMAL
BACTERIA SPEC AEROBE CULT: NORMAL

## 2024-05-09 ENCOUNTER — READMISSION MANAGEMENT (OUTPATIENT)
Dept: CALL CENTER | Facility: HOSPITAL | Age: 64
End: 2024-05-09
Payer: MEDICARE

## 2024-07-21 ENCOUNTER — APPOINTMENT (OUTPATIENT)
Dept: GENERAL RADIOLOGY | Facility: HOSPITAL | Age: 64
End: 2024-07-21
Payer: MEDICARE

## 2024-07-21 ENCOUNTER — HOSPITAL ENCOUNTER (EMERGENCY)
Facility: HOSPITAL | Age: 64
Discharge: HOME | End: 2024-07-21
Attending: STUDENT IN AN ORGANIZED HEALTH CARE EDUCATION/TRAINING PROGRAM | Admitting: STUDENT IN AN ORGANIZED HEALTH CARE EDUCATION/TRAINING PROGRAM
Payer: MEDICARE

## 2024-07-21 VITALS
RESPIRATION RATE: 18 BRPM | WEIGHT: 114 LBS | HEIGHT: 65 IN | OXYGEN SATURATION: 99 % | DIASTOLIC BLOOD PRESSURE: 87 MMHG | HEART RATE: 84 BPM | BODY MASS INDEX: 18.99 KG/M2 | SYSTOLIC BLOOD PRESSURE: 122 MMHG | TEMPERATURE: 97.9 F

## 2024-07-21 DIAGNOSIS — R44.3 HALLUCINATIONS, UNSPECIFIED: Primary | ICD-10-CM

## 2024-07-21 DIAGNOSIS — L97.519 ULCER OF RIGHT FOOT, UNSPECIFIED ULCER STAGE: ICD-10-CM

## 2024-07-21 LAB
A-A DO2: 21.2 MMHG (ref 0–300)
ALBUMIN SERPL-MCNC: 2.7 G/DL (ref 3.5–5.2)
ALBUMIN/GLOB SERPL: 0.7 G/DL
ALP SERPL-CCNC: 198 U/L (ref 39–117)
ALT SERPL W P-5'-P-CCNC: 7 U/L (ref 1–33)
AMPHET+METHAMPHET UR QL: NEGATIVE
AMPHETAMINES UR QL: NEGATIVE
ANION GAP SERPL CALCULATED.3IONS-SCNC: 11.5 MMOL/L (ref 5–15)
ARTERIAL PATENCY WRIST A: ABNORMAL
AST SERPL-CCNC: 16 U/L (ref 1–32)
ATMOSPHERIC PRESS: 727 MMHG
BACTERIA UR QL AUTO: ABNORMAL /HPF
BARBITURATES UR QL SCN: NEGATIVE
BASE EXCESS BLDA CALC-SCNC: 3.7 MMOL/L (ref 0–2)
BASOPHILS # BLD AUTO: 0.05 10*3/MM3 (ref 0–0.2)
BASOPHILS NFR BLD AUTO: 0.7 % (ref 0–1.5)
BDY SITE: ABNORMAL
BENZODIAZ UR QL SCN: NEGATIVE
BILIRUB SERPL-MCNC: 0.3 MG/DL (ref 0–1.2)
BILIRUB UR QL STRIP: NEGATIVE
BUN SERPL-MCNC: 22 MG/DL (ref 8–23)
BUN/CREAT SERPL: 5.6 (ref 7–25)
BUPRENORPHINE SERPL-MCNC: NEGATIVE NG/ML
CALCIUM SPEC-SCNC: 8.3 MG/DL (ref 8.6–10.5)
CANNABINOIDS SERPL QL: NEGATIVE
CHLORIDE SERPL-SCNC: 99 MMOL/L (ref 98–107)
CK SERPL-CCNC: 49 U/L (ref 20–180)
CLARITY UR: ABNORMAL
CO2 BLDA-SCNC: 29.8 MMOL/L (ref 22–33)
CO2 SERPL-SCNC: 25.5 MMOL/L (ref 22–29)
COCAINE UR QL: NEGATIVE
COHGB MFR BLD: 1.3 % (ref 0–5)
COLOR UR: YELLOW
CREAT SERPL-MCNC: 3.95 MG/DL (ref 0.57–1)
DEPRECATED RDW RBC AUTO: 50 FL (ref 37–54)
EGFRCR SERPLBLD CKD-EPI 2021: 12 ML/MIN/1.73
EOSINOPHIL # BLD AUTO: 0.17 10*3/MM3 (ref 0–0.4)
EOSINOPHIL NFR BLD AUTO: 2.4 % (ref 0.3–6.2)
ERYTHROCYTE [DISTWIDTH] IN BLOOD BY AUTOMATED COUNT: 14.1 % (ref 12.3–15.4)
FENTANYL UR-MCNC: NEGATIVE NG/ML
GLOBULIN UR ELPH-MCNC: 3.7 GM/DL
GLUCOSE BLDC GLUCOMTR-MCNC: 151 MG/DL (ref 70–130)
GLUCOSE SERPL-MCNC: 185 MG/DL (ref 65–99)
GLUCOSE UR STRIP-MCNC: NEGATIVE MG/DL
HCO3 BLDA-SCNC: 28.5 MMOL/L (ref 20–26)
HCT VFR BLD AUTO: 39.8 % (ref 34–46.6)
HCT VFR BLD CALC: 33.4 % (ref 38–51)
HGB BLD-MCNC: 12.6 G/DL (ref 12–15.9)
HGB BLDA-MCNC: 10.9 G/DL (ref 13.5–17.5)
HGB UR QL STRIP.AUTO: ABNORMAL
HOLD SPECIMEN: NORMAL
HOLD SPECIMEN: NORMAL
HYALINE CASTS UR QL AUTO: ABNORMAL /LPF
IMM GRANULOCYTES # BLD AUTO: 0.02 10*3/MM3 (ref 0–0.05)
IMM GRANULOCYTES NFR BLD AUTO: 0.3 % (ref 0–0.5)
INHALED O2 CONCENTRATION: 21 %
INR PPP: 0.88 (ref 0.9–1.1)
KETONES UR QL STRIP: NEGATIVE
LEUKOCYTE ESTERASE UR QL STRIP.AUTO: ABNORMAL
LYMPHOCYTES # BLD AUTO: 1.07 10*3/MM3 (ref 0.7–3.1)
LYMPHOCYTES NFR BLD AUTO: 14.9 % (ref 19.6–45.3)
Lab: ABNORMAL
MAGNESIUM SERPL-MCNC: 1.6 MG/DL (ref 1.6–2.4)
MCH RBC QN AUTO: 30.6 PG (ref 26.6–33)
MCHC RBC AUTO-ENTMCNC: 31.7 G/DL (ref 31.5–35.7)
MCV RBC AUTO: 96.6 FL (ref 79–97)
METHADONE UR QL SCN: NEGATIVE
METHGB BLD QL: 0.4 % (ref 0–3)
MODALITY: ABNORMAL
MONOCYTES # BLD AUTO: 0.33 10*3/MM3 (ref 0.1–0.9)
MONOCYTES NFR BLD AUTO: 4.6 % (ref 5–12)
NEUTROPHILS NFR BLD AUTO: 5.55 10*3/MM3 (ref 1.7–7)
NEUTROPHILS NFR BLD AUTO: 77.1 % (ref 42.7–76)
NITRITE UR QL STRIP: NEGATIVE
NRBC BLD AUTO-RTO: 0 /100 WBC (ref 0–0.2)
OPIATES UR QL: POSITIVE
OXYCODONE UR QL SCN: NEGATIVE
OXYHGB MFR BLDV: 93.9 % (ref 94–99)
PCO2 BLDA: 42.7 MM HG (ref 35–45)
PCO2 TEMP ADJ BLD: ABNORMAL MM[HG]
PCP UR QL SCN: NEGATIVE
PH BLDA: 7.43 PH UNITS (ref 7.35–7.45)
PH UR STRIP.AUTO: 8.5 [PH] (ref 5–8)
PH, TEMP CORRECTED: ABNORMAL
PLATELET # BLD AUTO: 209 10*3/MM3 (ref 140–450)
PMV BLD AUTO: 9.4 FL (ref 6–12)
PO2 BLDA: 73.5 MM HG (ref 83–108)
PO2 TEMP ADJ BLD: ABNORMAL MM[HG]
POTASSIUM SERPL-SCNC: 5.5 MMOL/L (ref 3.5–5.2)
PROT SERPL-MCNC: 6.4 G/DL (ref 6–8.5)
PROT UR QL STRIP: ABNORMAL
PROTHROMBIN TIME: 12.1 SECONDS (ref 12.1–14.7)
RBC # BLD AUTO: 4.12 10*6/MM3 (ref 3.77–5.28)
RBC # UR STRIP: ABNORMAL /HPF
REF LAB TEST METHOD: ABNORMAL
SAO2 % BLDCOA: 95.5 % (ref 94–99)
SODIUM SERPL-SCNC: 136 MMOL/L (ref 136–145)
SP GR UR STRIP: 1.01 (ref 1–1.03)
SQUAMOUS #/AREA URNS HPF: ABNORMAL /HPF
TRICYCLICS UR QL SCN: NEGATIVE
TSH SERPL DL<=0.05 MIU/L-ACNC: 17.14 UIU/ML (ref 0.27–4.2)
UROBILINOGEN UR QL STRIP: ABNORMAL
VENTILATOR MODE: ABNORMAL
WBC # UR STRIP: ABNORMAL /HPF
WBC NRBC COR # BLD AUTO: 7.19 10*3/MM3 (ref 3.4–10.8)
WHOLE BLOOD HOLD COAG: NORMAL
WHOLE BLOOD HOLD SPECIMEN: NORMAL

## 2024-07-21 PROCEDURE — 36600 WITHDRAWAL OF ARTERIAL BLOOD: CPT

## 2024-07-21 PROCEDURE — 85025 COMPLETE CBC W/AUTO DIFF WBC: CPT | Performed by: STUDENT IN AN ORGANIZED HEALTH CARE EDUCATION/TRAINING PROGRAM

## 2024-07-21 PROCEDURE — 80307 DRUG TEST PRSMV CHEM ANLYZR: CPT | Performed by: STUDENT IN AN ORGANIZED HEALTH CARE EDUCATION/TRAINING PROGRAM

## 2024-07-21 PROCEDURE — 81001 URINALYSIS AUTO W/SCOPE: CPT | Performed by: STUDENT IN AN ORGANIZED HEALTH CARE EDUCATION/TRAINING PROGRAM

## 2024-07-21 PROCEDURE — 82948 REAGENT STRIP/BLOOD GLUCOSE: CPT

## 2024-07-21 PROCEDURE — 71045 X-RAY EXAM CHEST 1 VIEW: CPT | Performed by: RADIOLOGY

## 2024-07-21 PROCEDURE — 36415 COLL VENOUS BLD VENIPUNCTURE: CPT

## 2024-07-21 PROCEDURE — 83050 HGB METHEMOGLOBIN QUAN: CPT

## 2024-07-21 PROCEDURE — 87086 URINE CULTURE/COLONY COUNT: CPT | Performed by: STUDENT IN AN ORGANIZED HEALTH CARE EDUCATION/TRAINING PROGRAM

## 2024-07-21 PROCEDURE — 87077 CULTURE AEROBIC IDENTIFY: CPT | Performed by: STUDENT IN AN ORGANIZED HEALTH CARE EDUCATION/TRAINING PROGRAM

## 2024-07-21 PROCEDURE — 85610 PROTHROMBIN TIME: CPT | Performed by: STUDENT IN AN ORGANIZED HEALTH CARE EDUCATION/TRAINING PROGRAM

## 2024-07-21 PROCEDURE — 71045 X-RAY EXAM CHEST 1 VIEW: CPT

## 2024-07-21 PROCEDURE — 82375 ASSAY CARBOXYHB QUANT: CPT

## 2024-07-21 PROCEDURE — 82805 BLOOD GASES W/O2 SATURATION: CPT

## 2024-07-21 PROCEDURE — 87186 SC STD MICRODIL/AGAR DIL: CPT | Performed by: STUDENT IN AN ORGANIZED HEALTH CARE EDUCATION/TRAINING PROGRAM

## 2024-07-21 PROCEDURE — 99284 EMERGENCY DEPT VISIT MOD MDM: CPT

## 2024-07-21 PROCEDURE — 84443 ASSAY THYROID STIM HORMONE: CPT | Performed by: STUDENT IN AN ORGANIZED HEALTH CARE EDUCATION/TRAINING PROGRAM

## 2024-07-21 PROCEDURE — 80053 COMPREHEN METABOLIC PANEL: CPT | Performed by: STUDENT IN AN ORGANIZED HEALTH CARE EDUCATION/TRAINING PROGRAM

## 2024-07-21 PROCEDURE — 82550 ASSAY OF CK (CPK): CPT | Performed by: STUDENT IN AN ORGANIZED HEALTH CARE EDUCATION/TRAINING PROGRAM

## 2024-07-21 PROCEDURE — 83735 ASSAY OF MAGNESIUM: CPT | Performed by: STUDENT IN AN ORGANIZED HEALTH CARE EDUCATION/TRAINING PROGRAM

## 2024-07-21 RX ORDER — SODIUM CHLORIDE 0.9 % (FLUSH) 0.9 %
10 SYRINGE (ML) INJECTION AS NEEDED
Status: DISCONTINUED | OUTPATIENT
Start: 2024-07-21 | End: 2024-07-21 | Stop reason: HOSPADM

## 2024-07-21 NOTE — ED NOTES
"Attempted to obtain urine specimen and patient states \"these creachers are running around here\" patient refused cath at this time and states she will pee. Informed patient we will have to cath her if she doesn't pee.   "

## 2024-07-21 NOTE — ED PROVIDER NOTES
Subjective   History of Present Illness  Patient is a 64-year-old female with history significant for ESRD on peritoneal dialysis, type 2 diabetes mellitus and seizure disorder who presents to the ER with reports of visual hallucinations.  She reports visualizations for the past 3 weeks.  She is aware that these are hallucinations.  She states hallucinations started after they switched her from hydrocodone to oxycodone but does not want this medication adjusted because the Norco's did not help her pain.  She says if anything she needs more pain medicine for her breakthrough pain.  Also worried about her right foot.  Also complains of a headache, intermittent fevers, lack of appetite and dizziness.  Patient is alert and orient x 3.  She denies any urinary symptoms.  Denies any diarrhea.  Denies any chest pain/pressure or tightness other symptoms.      Review of Systems   Constitutional:  Positive for fever. Negative for chills and fatigue.   HENT:  Negative for ear pain, sinus pain and sore throat.    Respiratory:  Negative for cough, chest tightness, shortness of breath and wheezing.    Cardiovascular:  Negative for chest pain, palpitations and leg swelling.   Gastrointestinal:  Negative for abdominal pain, constipation, diarrhea, nausea and vomiting.   Genitourinary:  Negative for dysuria, hematuria and urgency.   Musculoskeletal:  Negative for arthralgias and myalgias.   Neurological:  Positive for dizziness and headaches. Negative for syncope and light-headedness.   Psychiatric/Behavioral:  Positive for hallucinations. Negative for confusion.        Past Medical History:   Diagnosis Date    Arthritis     Closed fracture of right fibula and tibia 12/25/2018    Depression     Diabetic ulcer of left foot associated with type 2 diabetes mellitus 6/23/2017    Diastolic dysfunction     Disease of thyroid gland     Elevated cholesterol     End stage renal disease     Essential hypertension     GERD (gastroesophageal  reflux disease)     History of transfusion     Hyperlipidemia     Iron deficiency anemia 2018    PAD (peripheral artery disease)     Renal failure     Type 2 diabetes mellitus with hyperglycemia, with long-term current use of insulin 2018       Allergies   Allergen Reactions    Penicillins Hives and GI Intolerance     Patient has received cefazolin, ceftriaxone and cefepime during past admissions.    Codeine Hives, Rash and GI Intolerance     Pt tolerates [place] @ home    Sulfa Antibiotics Hives, Rash and GI Intolerance     NAUSEA TOO       Past Surgical History:   Procedure Laterality Date    ABDOMINAL SURGERY      BACK SURGERY      Post spinal diskectomy, osteophytectomy in one lumbar interspace    CATARACT EXTRACTION      Left      SECTION      DENTAL PROCEDURE      ENDOSCOPY      FRACTURE SURGERY      right leg    HYSTERECTOMY      INCISION AND DRAINAGE LEG Left 4/15/2017    Procedure: INCISION AND DRAINAGE LOWER EXTREMITY;  Surgeon: Basilio Morris MD;  Location: [facility] OR;  Service:     INCISION AND DRAINAGE LEG Left 2017    Procedure: Irrigation and Debridment abcess diabetic wound left foot with deep culture;  Surgeon: Basilio Morris MD;  Location: [facility] OR;  Service:     INSERTION PERITONEAL DIALYSIS CATHETER N/A 2021    Procedure: INSERTION PERITONEAL DIALYSIS CATHETER LAPAROSCOPIC;  Surgeon: Edy Glover MD;  Location: [facility] OR;  Service: General;  Laterality: N/A;    KNEE ARTHROSCOPY Right     ORIF TIBIA FRACTURE Right 2018    Procedure: TIBIAL  FRACTURE OPEN REDUCTION INTERNAL FIXATION;  Surgeon: Jung Barragan MD;  Location: [facility] OR;  Service: Orthopedics    TOE AMPUTATION Right     5th    TUBAL ABDOMINAL LIGATION         Family History   Problem Relation Age of Onset    Heart disease Mother     Cancer Mother     COPD Mother     Diabetes Other     Depression Other        Social History     Socioeconomic History    Marital status:    Tobacco Use     Smoking status: Never    Smokeless tobacco: Never   Vaping Use    Vaping status: Never Used   Substance and Sexual Activity    Alcohol use: No    Drug use: No    Sexual activity: Defer     Partners: Male           Objective   Physical Exam  Vitals and nursing note reviewed. Exam conducted with a chaperone present.   Constitutional:       Appearance: Normal appearance. She is normal weight.   HENT:      Head: Normocephalic and atraumatic.      Nose: Nose normal.      Mouth/Throat:      Mouth: Mucous membranes are moist.   Eyes:      Extraocular Movements: Extraocular movements intact.      Conjunctiva/sclera: Conjunctivae normal.      Pupils: Pupils are equal, round, and reactive to light.   Cardiovascular:      Rate and Rhythm: Normal rate and regular rhythm.      Pulses: Normal pulses.      Heart sounds: Normal heart sounds.   Pulmonary:      Effort: Pulmonary effort is normal.      Breath sounds: Normal breath sounds.   Abdominal:      General: Bowel sounds are normal.      Palpations: Abdomen is soft.      Comments: PD cath surrounding erythema or drainage   Musculoskeletal:         General: Normal range of motion.      Cervical back: Normal range of motion and neck supple.   Skin:     General: Skin is warm and dry.      Capillary Refill: Capillary refill takes less than 2 seconds.   Neurological:      General: No focal deficit present.      Mental Status: She is alert and oriented to person, place, and time.   Psychiatric:         Mood and Affect: Mood normal.         Behavior: Behavior normal.         Procedures           ED Course                                             Medical Decision Making  --Patient is hemodynamically stable, suspect opiate induced but she does not want any adjustments to her pain regimen unless it is increased  --ABG unremarkable  --Labs consistent with ESRD  --Chest x-ray negative  --In regards to the right foot wound, refer to outpatient wound care  --Evaluated by psychiatry,  recommended outpatient f/u with psych and neurology, d/c home    Problems Addressed:  Hallucinations, unspecified: complicated acute illness or injury  Ulcer of right foot, unspecified ulcer stage: complicated acute illness or injury    Amount and/or Complexity of Data Reviewed  Labs: ordered.  Radiology: ordered.    Risk  Prescription drug management.        Final diagnoses:   Hallucinations, unspecified   Ulcer of right foot, unspecified ulcer stage       ED Disposition  ED Disposition       ED Disposition   Discharge    Condition   Stable    Comment   --               [facility]  [address] [place] [id number]  [phone] x3  Call in 1 day      Susan Stephens, APRMARIE  [address] [address]  [phone]    In 1 week      f/u neurology    In 1 week           Medication List      No changes were made to your prescriptions during this visit.            Ari Rosa DO  07/21/24 1928

## 2024-07-21 NOTE — NURSING NOTE
Spoke to  discussed pt assessment and labs.  recommends pt been seen by a neurologist and then can give outpatient psych recourses if still needed after seeing neurologist.  RBVOx2  ED provider made aware.

## 2024-07-21 NOTE — ED NOTES
"Patient reporting hallucinations including \"Seeing someone standing in her closet\". Patient is alert and oriented x4 at this time.   "

## 2024-07-21 NOTE — NURSING NOTE
"ED requested psych consult.     Pt states the past three weeks she has been having visual hallucinations. States she knows theres not really anyone there but she does sometimes see people while she's awake and sleeping.     Pt states she was here a few weeks ago and was prescribed \"Vimpat \" and went she takes it, it stops the hallucations but she's been out of them for the past week, and the pharmacy just got it in today.       Pt states she has had a lot of health issues the past month. States she fell and broke her neck  4 weeks ago, fell off the bed 3 1/2 weeks ago and hit her head, had a seizure a month ago, is unable to walk, and is incompetent.   States just a lot has changed in the past month or so and she just wants to feel normal again.     Pt adamantly denies SI/HI/AVH.     Pt rates anxiety and depression 9.      Pt states she doesn't want to stay here that she was just coming to the ED due to her pain and hallcautions and to see if we thought she needed a med change.   Pt is not seen anywhere outpatient.      "

## 2024-07-22 NOTE — ED NOTES
Called patient's healthcare surrogate to come pick patient up and he reports that he has no way to come get patient. Also called patient's daughter and received message that wireless caller is not available.

## 2024-07-24 LAB — BACTERIA SPEC AEROBE CULT: ABNORMAL

## 2024-07-26 ENCOUNTER — HOSPITAL ENCOUNTER (OUTPATIENT)
Dept: WOUND CARE | Facility: HOSPITAL | Age: 64
Discharge: HOME | End: 2024-07-26
Payer: MEDICARE

## 2024-07-26 ENCOUNTER — OFFICE VISIT (OUTPATIENT)
Dept: SURGERY | Facility: CLINIC | Age: 64
End: 2024-07-26
Payer: MEDICARE

## 2024-07-26 VITALS — WEIGHT: 114 LBS | HEIGHT: 65 IN | BODY MASS INDEX: 18.99 KG/M2

## 2024-07-26 VITALS
SYSTOLIC BLOOD PRESSURE: 74 MMHG | DIASTOLIC BLOOD PRESSURE: 50 MMHG | RESPIRATION RATE: 16 BRPM | TEMPERATURE: 98.9 F | BODY MASS INDEX: 16.66 KG/M2 | HEART RATE: 75 BPM | WEIGHT: 100 LBS | OXYGEN SATURATION: 97 % | HEIGHT: 65 IN

## 2024-07-26 DIAGNOSIS — T14.8XXA OPEN WOUND: Primary | ICD-10-CM

## 2024-07-26 DIAGNOSIS — L89.610 PRESSURE INJURY OF RIGHT HEEL, UNSTAGEABLE: ICD-10-CM

## 2024-07-26 DIAGNOSIS — I73.9 PERIPHERAL VASCULAR DISEASE, UNSPECIFIED: ICD-10-CM

## 2024-07-26 DIAGNOSIS — T82.41XA HEMODIALYSIS CATHETER DYSFUNCTION, INITIAL ENCOUNTER: Primary | ICD-10-CM

## 2024-07-26 PROCEDURE — 87070 CULTURE OTHR SPECIMN AEROBIC: CPT | Performed by: NURSE PRACTITIONER

## 2024-07-26 PROCEDURE — 87205 SMEAR GRAM STAIN: CPT | Performed by: NURSE PRACTITIONER

## 2024-07-26 PROCEDURE — 87077 CULTURE AEROBIC IDENTIFY: CPT | Performed by: NURSE PRACTITIONER

## 2024-07-26 PROCEDURE — 87186 SC STD MICRODIL/AGAR DIL: CPT | Performed by: NURSE PRACTITIONER

## 2024-07-26 PROCEDURE — 97602 WOUND(S) CARE NON-SELECTIVE: CPT

## 2024-07-26 RX ORDER — SODIUM HYPOCHLORITE 2.5 MG/ML
1 SOLUTION TOPICAL AS NEEDED
OUTPATIENT
Start: 2024-07-26

## 2024-07-26 RX ORDER — SODIUM HYPOCHLORITE 1.25 MG/ML
1 SOLUTION TOPICAL AS NEEDED
OUTPATIENT
Start: 2024-07-26

## 2024-07-26 RX ORDER — LIDOCAINE HYDROCHLORIDE AND EPINEPHRINE BITARTRATE 20; .01 MG/ML; MG/ML
10 INJECTION, SOLUTION SUBCUTANEOUS ONCE
Status: CANCELLED | OUTPATIENT
Start: 2024-07-26 | End: 2024-07-26

## 2024-07-26 RX ORDER — DIAPER,BRIEF,INFANT-TODD,DISP
1 EACH MISCELLANEOUS ONCE
Status: CANCELLED | OUTPATIENT
Start: 2024-07-26 | End: 2024-07-26

## 2024-07-26 RX ORDER — LIDOCAINE HYDROCHLORIDE AND EPINEPHRINE BITARTRATE 20; .01 MG/ML; MG/ML
10 INJECTION, SOLUTION SUBCUTANEOUS ONCE
OUTPATIENT
Start: 2024-07-26 | End: 2024-07-26

## 2024-07-26 RX ORDER — CASTOR OIL AND BALSAM, PERU 788; 87 MG/G; MG/G
1 OINTMENT TOPICAL AS NEEDED
Status: CANCELLED | OUTPATIENT
Start: 2024-07-26

## 2024-07-26 RX ORDER — LIDOCAINE HYDROCHLORIDE 20 MG/ML
6 JELLY TOPICAL ONCE
OUTPATIENT
Start: 2024-07-26 | End: 2024-07-26

## 2024-07-26 RX ORDER — LIDOCAINE HYDROCHLORIDE 20 MG/ML
JELLY TOPICAL AS NEEDED
OUTPATIENT
Start: 2024-07-26

## 2024-07-26 RX ORDER — LIDOCAINE HYDROCHLORIDE 20 MG/ML
10 INJECTION, SOLUTION INFILTRATION; PERINEURAL ONCE
OUTPATIENT
Start: 2024-07-26 | End: 2024-07-26

## 2024-07-26 RX ORDER — LIDOCAINE HYDROCHLORIDE 20 MG/ML
JELLY TOPICAL AS NEEDED
Status: CANCELLED | OUTPATIENT
Start: 2024-07-26

## 2024-07-26 RX ORDER — NYSTATIN 100000 [USP'U]/G
1 POWDER TOPICAL ONCE
OUTPATIENT
Start: 2024-07-26 | End: 2024-07-26

## 2024-07-26 RX ORDER — LIDOCAINE HYDROCHLORIDE 20 MG/ML
6 JELLY TOPICAL ONCE
Status: COMPLETED | OUTPATIENT
Start: 2024-07-26 | End: 2024-07-26

## 2024-07-26 RX ORDER — NYSTATIN 100000 [USP'U]/G
1 POWDER TOPICAL ONCE
Status: CANCELLED | OUTPATIENT
Start: 2024-07-26 | End: 2024-07-26

## 2024-07-26 RX ORDER — DIAPER,BRIEF,INFANT-TODD,DISP
1 EACH MISCELLANEOUS ONCE
OUTPATIENT
Start: 2024-07-26 | End: 2024-07-26

## 2024-07-26 RX ORDER — SODIUM HYPOCHLORITE 2.5 MG/ML
1 SOLUTION TOPICAL AS NEEDED
Status: CANCELLED | OUTPATIENT
Start: 2024-07-26

## 2024-07-26 RX ORDER — SODIUM HYPOCHLORITE 1.25 MG/ML
1 SOLUTION TOPICAL AS NEEDED
Status: CANCELLED | OUTPATIENT
Start: 2024-07-26

## 2024-07-26 RX ORDER — CASTOR OIL AND BALSAM, PERU 788; 87 MG/G; MG/G
1 OINTMENT TOPICAL AS NEEDED
OUTPATIENT
Start: 2024-07-26

## 2024-07-26 RX ORDER — LIDOCAINE HYDROCHLORIDE 20 MG/ML
10 INJECTION, SOLUTION INFILTRATION; PERINEURAL ONCE
Status: CANCELLED | OUTPATIENT
Start: 2024-07-26 | End: 2024-07-26

## 2024-07-26 RX ADMIN — COLLAGENASE SANTYL 1 APPLICATION: 250 OINTMENT TOPICAL at 10:30

## 2024-07-26 RX ADMIN — LIDOCAINE HYDROCHLORIDE 6 ML: 20 JELLY TOPICAL at 10:20

## 2024-07-26 NOTE — PROGRESS NOTES
Subjective   Reny Elena is a 64 y.o. female who presents today for Initial Evaluation    Chief Complaint:    Chief Complaint   Patient presents with    port removal        History of Present Illness:    History of Present Illness Reny is a 64-year-old female who presents for evaluation for an issue with her hemodialysis catheter.  She reports currently she is doing peritoneal dialysis and has not used her tunneled dialysis catheter in several weeks.  The dialysis clinic does report an exposed part of line to patient's right upper chest/neck area.    The following portions of the patient's history were reviewed and updated as appropriate: allergies, current medications, past family history, past medical history, past social history, past surgical history and problem list.    Past Medical History:  Past Medical History:   Diagnosis Date    Arthritis     Closed fracture of right fibula and tibia 12/25/2018    Depression     Diabetic ulcer of left foot associated with type 2 diabetes mellitus 6/23/2017    Diastolic dysfunction     Disease of thyroid gland     Elevated cholesterol     End stage renal disease     Essential hypertension     GERD (gastroesophageal reflux disease)     History of transfusion     Hyperlipidemia     Iron deficiency anemia 12/30/2018    PAD (peripheral artery disease)     Renal failure     Type 2 diabetes mellitus with hyperglycemia, with long-term current use of insulin 12/30/2018       Social History:  Social History     Socioeconomic History    Marital status:    Tobacco Use    Smoking status: Never     Passive exposure: Never    Smokeless tobacco: Never   Vaping Use    Vaping status: Never Used   Substance and Sexual Activity    Alcohol use: No    Drug use: No    Sexual activity: Defer     Partners: Male       Family History:  Family History   Problem Relation Age of Onset    Heart disease Mother     Cancer Mother     COPD Mother     Diabetes Other     Depression Other         Past Surgical History:  Past Surgical History:   Procedure Laterality Date    ABDOMINAL SURGERY      BACK SURGERY      Post spinal diskectomy, osteophytectomy in one lumbar interspace    CATARACT EXTRACTION      Left      SECTION      DENTAL PROCEDURE      ENDOSCOPY      FRACTURE SURGERY      right leg    HYSTERECTOMY      INCISION AND DRAINAGE LEG Left 4/15/2017    Procedure: INCISION AND DRAINAGE LOWER EXTREMITY;  Surgeon: Basilio Morris MD;  Location: [facility] OR;  Service:     INCISION AND DRAINAGE LEG Left 2017    Procedure: Irrigation and Debridment abcess diabetic wound left foot with deep culture;  Surgeon: Basilio Morris MD;  Location: [facility] OR;  Service:     INSERTION PERITONEAL DIALYSIS CATHETER N/A 2021    Procedure: INSERTION PERITONEAL DIALYSIS CATHETER LAPAROSCOPIC;  Surgeon: Edy Glover MD;  Location: [facility] OR;  Service: General;  Laterality: N/A;    KNEE ARTHROSCOPY Right     ORIF TIBIA FRACTURE Right 2018    Procedure: TIBIAL  FRACTURE OPEN REDUCTION INTERNAL FIXATION;  Surgeon: Jung Barragan MD;  Location: [facility] OR;  Service: Orthopedics    TOE AMPUTATION Right     5th    TUBAL ABDOMINAL LIGATION         Problem List:  Patient Active Problem List   Diagnosis    Tibia/fibula fracture    Iron deficiency anemia    Type 2 diabetes mellitus with hyperglycemia, with long-term current use of insulin    Wound of right ankle    Cellulitis    Metabolic encephalopathy    History of non-ST elevation myocardial infarction (NSTEMI)    HTN (hypertension)    CVA (cerebral vascular accident)    Chronic diastolic CHF (congestive heart failure)    Decubitus ulcer of coccyx, unspecified pressure ulcer stage    Depression    Expressive aphasia    Impaired mobility and ADLs    Hyperkalemia    Subdural hematoma    Severe malnutrition    Cerebrovascular accident (CVA), unspecified mechanism    PRES (posterior reversible encephalopathy syndrome)    Debility    Stage 5 chronic kidney  "disease on chronic dialysis    Seizure-like activity    Seizure       Allergy:   Allergies   Allergen Reactions    Penicillins Hives and GI Intolerance     Patient has received cefazolin, ceftriaxone and cefepime during past admissions.    Codeine Hives, Rash and GI Intolerance     Pt tolerates [place] @ home    Sulfa Antibiotics Hives, Rash and GI Intolerance     NAUSEA TOO        Current Medications:   Current Outpatient Medications   Medication Sig Dispense Refill    Alcohol Swabs (Alcohol Pads) 70 % pads Apply one alcohol swab to injection site of skin immediately prior to insulin injection. Formulary Compliance Approval. 100 each 12    atorvastatin (LIPITOR) 80 MG tablet Take 1 tablet by mouth Every Night. 30 tablet 0    carvedilol (COREG) 6.25 MG tablet Take 1 tablet by mouth 2 (Two) Times a Day With Meals. 60 tablet 0    cefdinir (OMNICEF) 300 MG capsule Take 1 capsule by mouth Every Other Day. 2 capsule 0    fluticasone (FLONASE) 50 MCG/ACT nasal spray 2 sprays into the nostril(s) as directed by provider Daily As Needed for Allergies.      folic acid (FOLVITE) 1 MG tablet Take 1 tablet by mouth Daily.      glucose blood test strip Use to test blood glucose up to four times daily as needed. Formulary Compliance Approval. Diagnosis: Type 2 Diabetes - Insulin Dependent 100 each 12    glucose monitor monitoring kit Use to test blood glucose up to four times daily as needed. Formulary Compliance Approval. Diagnosis: Type 2 Diabetes - Insulin Dependent 1 each 0    HYDROcodone-acetaminophen (NORCO)  MG per tablet Take 1 tablet by mouth 3 (Three) Times a Day As Needed for Moderate Pain.      hydrOXYzine (ATARAX) 25 MG tablet Take 1 tablet by mouth 3 (Three) Times a Day As Needed for Anxiety. 15 tablet 0    Insulin Syringe 30G X 1/2\" 0.5 ML misc Inject 1 each under the skin into the appropriate area as directed 4 (Four) Times a Day As Needed (for insulin injections). Formulary Compliance Approval 100 each 0    " Lancets misc Use to test blood glucose up to four times daily as needed. Formulary Compliance Approval. Diagnosis: Type 2 Diabetes - Insulin Dependent 100 each 12    levothyroxine (SYNTHROID, LEVOTHROID) 100 MCG tablet Take 1 tablet by mouth Every Morning. 30 tablet 0    loperamide (IMODIUM A-D) 2 MG tablet Take 1 tablet by mouth Daily As Needed for Diarrhea.      ondansetron ODT (ZOFRAN-ODT) 4 MG disintegrating tablet Place 1 tablet on the tongue Daily As Needed for Nausea or Vomiting.      pantoprazole (PROTONIX) 40 MG EC tablet Take 1 tablet by mouth Daily.      rOPINIRole (REQUIP) 0.5 MG tablet Take 1 tablet by mouth 2 (Two) Times a Day.      tiZANidine (ZANAFLEX) 4 MG tablet Take 1 tablet by mouth Every 6 (Six) Hours As Needed for Muscle Spasms.      traZODone (DESYREL) 50 MG tablet Take 1 tablet by mouth Every Night.      vitamin D (ERGOCALCIFEROL) 1.25 MG (66562 UT) capsule capsule Take 1 capsule by mouth 1 (One) Time Per Week.      lacosamide (VIMPAT) 50 MG tablet tablet Take 1 tablet by mouth Every 12 (Twelve) Hours for 30 days. 60 tablet 0    NIFEdipine XL (PROCARDIA XL) 90 MG 24 hr tablet Take 1 tablet by mouth Daily for 30 days. 30 tablet 0     No current facility-administered medications for this visit.       Review of Systems:    Review of Systems   Musculoskeletal:  Positive for neck pain.         Physical Exam:   Physical Exam  Constitutional:       Appearance: Normal appearance.       HENT:      Head: Normocephalic and atraumatic.      Right Ear: External ear normal.      Left Ear: External ear normal.   Eyes:      Conjunctiva/sclera: Conjunctivae normal.   Pulmonary:      Effort: Pulmonary effort is normal.   Abdominal:      General: Abdomen is flat.   Musculoskeletal:      Comments: Presents in wheelchair   Skin:     General: Skin is warm and dry.      Capillary Refill: Capillary refill takes less than 2 seconds.   Neurological:      General: No focal deficit present.      Mental Status: She is  "alert and oriented to person, place, and time.   Psychiatric:         Mood and Affect: Mood normal.         Behavior: Behavior normal.       Procedure: Informed consent was obtained.  Area was cleansed with Clexane.  Lidocaine was used as local anesthesia.  Cuff was freed using scissors.  Hemodialysis catheter was then removed.  Pressure was applied to right neck.  A bandage was then used to cover TDC entrance site.  As well as open area towards the lower neck.  Patient tolerated well.  No blood loss noted.  No complications noted.    Vitals:  Height 165.1 cm (65\"), weight 51.7 kg (114 lb), last menstrual period 05/26/2005, not currently breastfeeding.   Body mass index is 18.97 kg/m².         Assessment & Plan   Diagnoses and all orders for this visit:    1. Hemodialysis catheter dysfunction, initial encounter (Primary)    Reny is a 64-year-old female who presents for evaluation for tunneled dialysis catheter removal.  It was removed in office today.  Patient will follow-up as needed.    Visit Diagnoses:    ICD-10-CM ICD-9-CM   1. Hemodialysis catheter dysfunction, initial encounter  T82.41XA 996.1     E/M portion took 10 minutes  Procedure took 20 minutes    MEDS ORDERED DURING VISIT:  No orders of the defined types were placed in this encounter.      Return if symptoms worsen or fail to improve.             This document has been electronically signed by DARIUS Waller  July 26, 2024 09:56 EDT    Please note that portions of this note were completed with a voice recognition program.   "

## 2024-07-26 NOTE — PROGRESS NOTES
Wound Clinic Note  Patient Identification:  Name:  Reny Eelna  Age:  64 y.o.  Sex:  female  :  1960  MRN:  [id number]   Visit Number:  [id number]  Primary Care Physician:  Susan Stephens APRN     Subjective     Chief complaint:     Coccyx pressure injury, and right foot ulcers    History of presenting illness: ***  Patient is a 64 y.o. female with past medical history significant for *** that presented today for evaluation of ***    ---------------------------------------------------------------------------------------------------------------------   Review of Systems ***  ---------------------------------------------------------------------------------------------------------------------   Past Medical History:   Diagnosis Date    Arthritis     Closed fracture of right fibula and tibia 2018    Depression     Diabetic ulcer of left foot associated with type 2 diabetes mellitus 2017    Diastolic dysfunction     Disease of thyroid gland     Elevated cholesterol     End stage renal disease     Essential hypertension     GERD (gastroesophageal reflux disease)     History of transfusion     Hyperlipidemia     Iron deficiency anemia 2018    PAD (peripheral artery disease)     Renal failure     Type 2 diabetes mellitus with hyperglycemia, with long-term current use of insulin 2018     Past Surgical History:   Procedure Laterality Date    ABDOMINAL SURGERY      BACK SURGERY      Post spinal diskectomy, osteophytectomy in one lumbar interspace    CATARACT EXTRACTION      Left      SECTION      DENTAL PROCEDURE      ENDOSCOPY      FRACTURE SURGERY      right leg    HYSTERECTOMY      INCISION AND DRAINAGE LEG Left 4/15/2017    Procedure: INCISION AND DRAINAGE LOWER EXTREMITY;  Surgeon: Basilio Morris MD;  Location: [facility];  Service:     INCISION AND DRAINAGE LEG Left 2017    Procedure: Irrigation and Debridment abcess diabetic wound left foot with deep culture;   "Surgeon: Basilio Morris MD;  Location:  [facility];  Service:     INSERTION PERITONEAL DIALYSIS CATHETER N/A 12/16/2021    Procedure: INSERTION PERITONEAL DIALYSIS CATHETER LAPAROSCOPIC;  Surgeon: Edy Glover MD;  Location:  [facility];  Service: General;  Laterality: N/A;    KNEE ARTHROSCOPY Right     ORIF TIBIA FRACTURE Right 12/28/2018    Procedure: TIBIAL  FRACTURE OPEN REDUCTION INTERNAL FIXATION;  Surgeon: Jung Barragan MD;  Location:  [facility];  Service: Orthopedics    TOE AMPUTATION Right     5th    TUBAL ABDOMINAL LIGATION       Family History   Problem Relation Age of Onset    Heart disease Mother     Cancer Mother     COPD Mother     Diabetes Other     Depression Other      Social History     Socioeconomic History    Marital status:    Tobacco Use    Smoking status: Never     Passive exposure: Never    Smokeless tobacco: Never   Vaping Use    Vaping status: Never Used   Substance and Sexual Activity    Alcohol use: No    Drug use: No    Sexual activity: Defer     Partners: Male     ---------------------------------------------------------------------------------------------------------------------   Allergies:  Penicillins, Codeine, and Sulfa antibiotics  ---------------------------------------------------------------------------------------------------------------------  Objective     ---------------------------------------------------------------------------------------------------------------------   Vital Signs:  Temp 98.9 °F (37.2 °C) (Infrared)   Resp 16   Ht 165.1 cm (65\")   Wt 45.4 kg (100 lb) Comment: stated  SpO2 97%   BMI 16.64 kg/m²   Estimated body mass index is 16.64 kg/m² as calculated from the following:    Height as of this encounter: 165.1 cm (65\").    Weight as of this encounter: 45.4 kg (100 lb).  Body mass index is 16.64 kg/m².  Wt Readings from Last 3 Encounters:   07/26/24 51.7 kg (114 lb)   07/26/24 45.4 kg (100 lb)   07/21/24 51.7 kg (114 lb)       " ---------------------------------------------------------------------------------------------------------------------   Physical Exam  Wound Assessment:  Location: {Location- traumatic wound:02013}  Wound Measurements: Length:  Width:   Depth:  Tunneling: {yes and no:32988} Undermining: {yes and no:93710}  Dressing Appearance: {dressingappearance:71950}  Closure: {wound closure:21256}  Changes since last exam: {wound changes :40530}  Etiology and classification: {wound etiology:58836}  Wound bed structures/characteristics: {wound structures:57737}  Edges {wound drainage :38332}  Periwound characteristics: {periwound characteristics:00735}  Periwound Temperature: {skin temperature :68118}  Drainage characteristics: {wound drainage :80206}  Perfusion characteristics: {perfusion characteristics:15111}    Wound Goal (s):{Blank Multiple:87990}  Assessment & Plan      Patient Active Problem List    Diagnosis     Open wound [T14.8XXA]     Seizure [R56.9]     Seizure-like activity [R56.9]     Stage 5 chronic kidney disease on chronic dialysis [N18.6, Z99.2]     Debility [R53.81]     PRES (posterior reversible encephalopathy syndrome) [I67.83]     Cerebrovascular accident (CVA), unspecified mechanism [I63.9]     Severe malnutrition [E43]     Subdural hematoma [S06.5XAA]     Hyperkalemia [E87.5]     HTN (hypertension) [I10]     CVA (cerebral vascular accident) [I63.9]     Chronic diastolic CHF (congestive heart failure) [I50.32]     Decubitus ulcer of coccyx, unspecified pressure ulcer stage [L89.159]     Depression [F32.A]     Expressive aphasia [R47.01]     Impaired mobility and ADLs [Z74.09, Z78.9]     History of non-ST elevation myocardial infarction (NSTEMI) [I25.2]     Cellulitis [L03.90]     Metabolic encephalopathy [G93.41]     Wound of right ankle [S91.001A]     Iron deficiency anemia [D50.9]     Type 2 diabetes mellitus with hyperglycemia, with long-term current use of insulin [E11.65, Z79.4]     Tibia/fibula  fracture [S82.209A, S82.409A]         Clinical Impression:{Blank Multiple:78538}    Follow-up: {CWS HEARING AID FOLLOW UP:89485}    DARIUS Gamboa,CWS  [facility]  07/26/24  10:41 EDT     diet 0.75g/kg/day along with vitamin C 2000mg/day, vitamin A 5000 Units/day, vitamin D3 5000 Units/day, zinc 50mg/day to help promote wound healing   -Offloading measures discussed      Debility [R53.81]  -Increase risk of worsening wounds due to limited mobility      Type 2 diabetes mellitus   --Recommend adequate glycemic control to help promote wound healing  -Poor glycemic control increases risk of prolonged healing, infection, loss of limb and premature death     PAD  -KRISTY- to assess for underlying vascular disease that can negatively impact wound healing       Clinical Impression:Moderate Complexity    Follow-up: 1 week    DARIUS Gamboa,CWS  [facility]  07/26/24  10:41 EDT

## 2024-07-30 LAB
BACTERIA SPEC AEROBE CULT: ABNORMAL
BACTERIA SPEC AEROBE CULT: ABNORMAL
GRAM STN SPEC: ABNORMAL

## 2024-08-01 PROBLEM — L89.613 PRESSURE INJURY OF RIGHT HEEL, STAGE 3: Status: ACTIVE | Noted: 2024-08-01

## 2024-08-01 PROBLEM — L89.152 PRESSURE INJURY OF SACRAL REGION, STAGE 2: Status: ACTIVE | Noted: 2024-08-01

## 2024-08-01 PROBLEM — L89.610 PRESSURE INJURY OF RIGHT HEEL, UNSTAGEABLE: Status: ACTIVE | Noted: 2024-08-01

## 2024-08-02 ENCOUNTER — APPOINTMENT (OUTPATIENT)
Dept: GENERAL RADIOLOGY | Facility: HOSPITAL | Age: 64
DRG: 853 | End: 2024-08-02
Payer: MEDICARE

## 2024-08-02 ENCOUNTER — HOSPITAL ENCOUNTER (INPATIENT)
Facility: HOSPITAL | Age: 64
LOS: 18 days | Discharge: HOME | DRG: 853 | End: 2024-08-21
Attending: EMERGENCY MEDICINE | Admitting: FAMILY MEDICINE
Payer: MEDICARE

## 2024-08-02 ENCOUNTER — APPOINTMENT (OUTPATIENT)
Dept: CT IMAGING | Facility: HOSPITAL | Age: 64
DRG: 853 | End: 2024-08-02
Payer: MEDICARE

## 2024-08-02 DIAGNOSIS — L97.519 ULCER OF RIGHT FOOT, UNSPECIFIED ULCER STAGE: ICD-10-CM

## 2024-08-02 DIAGNOSIS — Z89.511 S/P BKA (BELOW KNEE AMPUTATION), RIGHT: ICD-10-CM

## 2024-08-02 DIAGNOSIS — E11.621 DIABETIC ULCER OF RIGHT HEEL ASSOCIATED WITH TYPE 2 DIABETES MELLITUS, WITH BONE INVOLVEMENT WITHOUT EVIDENCE OF NECROSIS: Primary | ICD-10-CM

## 2024-08-02 DIAGNOSIS — L97.416 DIABETIC ULCER OF RIGHT HEEL ASSOCIATED WITH TYPE 2 DIABETES MELLITUS, WITH BONE INVOLVEMENT WITHOUT EVIDENCE OF NECROSIS: Primary | ICD-10-CM

## 2024-08-02 DIAGNOSIS — R11.2 NAUSEA AND VOMITING, UNSPECIFIED VOMITING TYPE: ICD-10-CM

## 2024-08-02 DIAGNOSIS — Z99.2: ICD-10-CM

## 2024-08-02 DIAGNOSIS — F41.9 ANXIETY: ICD-10-CM

## 2024-08-02 DIAGNOSIS — N30.01 ACUTE CYSTITIS WITH HEMATURIA: ICD-10-CM

## 2024-08-02 LAB
ALBUMIN SERPL-MCNC: 2.9 G/DL (ref 3.5–5.2)
ALBUMIN/GLOB SERPL: 0.6 G/DL
ALP SERPL-CCNC: 323 U/L (ref 39–117)
ALT SERPL W P-5'-P-CCNC: 11 U/L (ref 1–33)
ANION GAP SERPL CALCULATED.3IONS-SCNC: 15.4 MMOL/L (ref 5–15)
AST SERPL-CCNC: 18 U/L (ref 1–32)
BACTERIA UR QL AUTO: ABNORMAL /HPF
BASOPHILS # BLD AUTO: 0.01 10*3/MM3 (ref 0–0.2)
BASOPHILS NFR BLD AUTO: 0.1 % (ref 0–1.5)
BILIRUB SERPL-MCNC: 0.4 MG/DL (ref 0–1.2)
BILIRUB UR QL STRIP: NEGATIVE
BUN SERPL-MCNC: 30 MG/DL (ref 8–23)
BUN/CREAT SERPL: 6.9 (ref 7–25)
CALCIUM SPEC-SCNC: 7.6 MG/DL (ref 8.6–10.5)
CHLORIDE SERPL-SCNC: 94 MMOL/L (ref 98–107)
CLARITY UR: ABNORMAL
CO2 SERPL-SCNC: 24.6 MMOL/L (ref 22–29)
COLOR UR: YELLOW
CREAT SERPL-MCNC: 4.36 MG/DL (ref 0.57–1)
CRP SERPL-MCNC: 2.34 MG/DL (ref 0–0.5)
D-LACTATE SERPL-SCNC: 1.1 MMOL/L (ref 0.5–2)
DEPRECATED RDW RBC AUTO: 48 FL (ref 37–54)
EGFRCR SERPLBLD CKD-EPI 2021: 10.6 ML/MIN/1.73
EOSINOPHIL # BLD AUTO: 0.02 10*3/MM3 (ref 0–0.4)
EOSINOPHIL NFR BLD AUTO: 0.2 % (ref 0.3–6.2)
ERYTHROCYTE [DISTWIDTH] IN BLOOD BY AUTOMATED COUNT: 13.6 % (ref 12.3–15.4)
GLOBULIN UR ELPH-MCNC: 4.5 GM/DL
GLUCOSE SERPL-MCNC: 176 MG/DL (ref 65–99)
GLUCOSE UR STRIP-MCNC: ABNORMAL MG/DL
HCT VFR BLD AUTO: 40.5 % (ref 34–46.6)
HGB BLD-MCNC: 13 G/DL (ref 12–15.9)
HGB UR QL STRIP.AUTO: ABNORMAL
HOLD SPECIMEN: NORMAL
HYALINE CASTS UR QL AUTO: ABNORMAL /LPF
IMM GRANULOCYTES # BLD AUTO: 0.08 10*3/MM3 (ref 0–0.05)
IMM GRANULOCYTES NFR BLD AUTO: 0.8 % (ref 0–0.5)
KETONES UR QL STRIP: NEGATIVE
LEUKOCYTE ESTERASE UR QL STRIP.AUTO: ABNORMAL
LIPASE SERPL-CCNC: 25 U/L (ref 13–60)
LYMPHOCYTES # BLD AUTO: 1.73 10*3/MM3 (ref 0.7–3.1)
LYMPHOCYTES NFR BLD AUTO: 18.3 % (ref 19.6–45.3)
MCH RBC QN AUTO: 30.9 PG (ref 26.6–33)
MCHC RBC AUTO-ENTMCNC: 32.1 G/DL (ref 31.5–35.7)
MCV RBC AUTO: 96.2 FL (ref 79–97)
MONOCYTES # BLD AUTO: 0.31 10*3/MM3 (ref 0.1–0.9)
MONOCYTES NFR BLD AUTO: 3.3 % (ref 5–12)
NEUTROPHILS NFR BLD AUTO: 7.29 10*3/MM3 (ref 1.7–7)
NEUTROPHILS NFR BLD AUTO: 77.3 % (ref 42.7–76)
NITRITE UR QL STRIP: NEGATIVE
NRBC BLD AUTO-RTO: 0 /100 WBC (ref 0–0.2)
PH UR STRIP.AUTO: 7.5 [PH] (ref 5–8)
PLATELET # BLD AUTO: 251 10*3/MM3 (ref 140–450)
PMV BLD AUTO: 9.2 FL (ref 6–12)
POTASSIUM SERPL-SCNC: 5.1 MMOL/L (ref 3.5–5.2)
PROT SERPL-MCNC: 7.4 G/DL (ref 6–8.5)
PROT UR QL STRIP: ABNORMAL
RBC # BLD AUTO: 4.21 10*6/MM3 (ref 3.77–5.28)
RBC # UR STRIP: ABNORMAL /HPF
REF LAB TEST METHOD: ABNORMAL
SODIUM SERPL-SCNC: 134 MMOL/L (ref 136–145)
SP GR UR STRIP: 1.01 (ref 1–1.03)
SQUAMOUS #/AREA URNS HPF: ABNORMAL /HPF
UROBILINOGEN UR QL STRIP: ABNORMAL
WBC # UR STRIP: ABNORMAL /HPF
WBC NRBC COR # BLD AUTO: 9.44 10*3/MM3 (ref 3.4–10.8)
WHOLE BLOOD HOLD COAG: NORMAL
WHOLE BLOOD HOLD SPECIMEN: NORMAL

## 2024-08-02 PROCEDURE — 83036 HEMOGLOBIN GLYCOSYLATED A1C: CPT | Performed by: HOSPITALIST

## 2024-08-02 PROCEDURE — 86140 C-REACTIVE PROTEIN: CPT

## 2024-08-02 PROCEDURE — 83605 ASSAY OF LACTIC ACID: CPT | Performed by: EMERGENCY MEDICINE

## 2024-08-02 PROCEDURE — 25010000002 ONDANSETRON PER 1 MG

## 2024-08-02 PROCEDURE — 36415 COLL VENOUS BLD VENIPUNCTURE: CPT

## 2024-08-02 PROCEDURE — 74176 CT ABD & PELVIS W/O CONTRAST: CPT

## 2024-08-02 PROCEDURE — 25810000003 SODIUM CHLORIDE 0.9 % SOLUTION

## 2024-08-02 PROCEDURE — 81001 URINALYSIS AUTO W/SCOPE: CPT | Performed by: EMERGENCY MEDICINE

## 2024-08-02 PROCEDURE — 83690 ASSAY OF LIPASE: CPT | Performed by: EMERGENCY MEDICINE

## 2024-08-02 PROCEDURE — 73620 X-RAY EXAM OF FOOT: CPT | Performed by: RADIOLOGY

## 2024-08-02 PROCEDURE — 99285 EMERGENCY DEPT VISIT HI MDM: CPT

## 2024-08-02 PROCEDURE — 73620 X-RAY EXAM OF FOOT: CPT

## 2024-08-02 PROCEDURE — 87040 BLOOD CULTURE FOR BACTERIA: CPT | Performed by: STUDENT IN AN ORGANIZED HEALTH CARE EDUCATION/TRAINING PROGRAM

## 2024-08-02 PROCEDURE — 85025 COMPLETE CBC W/AUTO DIFF WBC: CPT | Performed by: EMERGENCY MEDICINE

## 2024-08-02 PROCEDURE — 80053 COMPREHEN METABOLIC PANEL: CPT | Performed by: EMERGENCY MEDICINE

## 2024-08-02 PROCEDURE — 25010000002 CEFTRIAXONE PER 250 MG: Performed by: STUDENT IN AN ORGANIZED HEALTH CARE EDUCATION/TRAINING PROGRAM

## 2024-08-02 RX ORDER — ONDANSETRON 2 MG/ML
4 INJECTION INTRAMUSCULAR; INTRAVENOUS ONCE
Status: COMPLETED | OUTPATIENT
Start: 2024-08-02 | End: 2024-08-02

## 2024-08-02 RX ORDER — SODIUM CHLORIDE 0.9 % (FLUSH) 0.9 %
10 SYRINGE (ML) INJECTION AS NEEDED
Status: DISCONTINUED | OUTPATIENT
Start: 2024-08-02 | End: 2024-08-21 | Stop reason: HOSPADM

## 2024-08-02 RX ADMIN — SODIUM CHLORIDE 500 ML: 9 INJECTION, SOLUTION INTRAVENOUS at 19:44

## 2024-08-02 RX ADMIN — CEFTRIAXONE 1000 MG: 1 INJECTION, POWDER, FOR SOLUTION INTRAMUSCULAR; INTRAVENOUS at 23:02

## 2024-08-02 RX ADMIN — ONDANSETRON 4 MG: 2 INJECTION INTRAMUSCULAR; INTRAVENOUS at 19:44

## 2024-08-03 ENCOUNTER — APPOINTMENT (OUTPATIENT)
Dept: CT IMAGING | Facility: HOSPITAL | Age: 64
DRG: 853 | End: 2024-08-03
Payer: MEDICARE

## 2024-08-03 PROBLEM — E11.621 DIABETIC ULCER OF RIGHT HEEL: Status: ACTIVE | Noted: 2024-08-03

## 2024-08-03 PROBLEM — L97.419 DIABETIC ULCER OF RIGHT HEEL: Status: ACTIVE | Noted: 2024-08-03

## 2024-08-03 LAB
ALBUMIN SERPL-MCNC: 2.3 G/DL (ref 3.5–5.2)
ALBUMIN/GLOB SERPL: 0.7 G/DL
ALP SERPL-CCNC: 232 U/L (ref 39–117)
ALT SERPL W P-5'-P-CCNC: 8 U/L (ref 1–33)
ANION GAP SERPL CALCULATED.3IONS-SCNC: 14 MMOL/L (ref 5–15)
AST SERPL-CCNC: 11 U/L (ref 1–32)
BASOPHILS # BLD AUTO: 0.01 10*3/MM3 (ref 0–0.2)
BASOPHILS NFR BLD AUTO: 0.1 % (ref 0–1.5)
BILIRUB SERPL-MCNC: 0.2 MG/DL (ref 0–1.2)
BUN SERPL-MCNC: 31 MG/DL (ref 8–23)
BUN/CREAT SERPL: 7.3 (ref 7–25)
CALCIUM SPEC-SCNC: 6.8 MG/DL (ref 8.6–10.5)
CHLORIDE SERPL-SCNC: 100 MMOL/L (ref 98–107)
CO2 SERPL-SCNC: 23 MMOL/L (ref 22–29)
CREAT SERPL-MCNC: 4.26 MG/DL (ref 0.57–1)
CRP SERPL-MCNC: 1.83 MG/DL (ref 0–0.5)
CRP SERPL-MCNC: 2.25 MG/DL (ref 0–0.5)
DEPRECATED RDW RBC AUTO: 49.2 FL (ref 37–54)
EGFRCR SERPLBLD CKD-EPI 2021: 10.9 ML/MIN/1.73
EOSINOPHIL # BLD AUTO: 0.11 10*3/MM3 (ref 0–0.4)
EOSINOPHIL NFR BLD AUTO: 1.2 % (ref 0.3–6.2)
ERYTHROCYTE [DISTWIDTH] IN BLOOD BY AUTOMATED COUNT: 13.6 % (ref 12.3–15.4)
GLOBULIN UR ELPH-MCNC: 3.3 GM/DL
GLUCOSE BLDC GLUCOMTR-MCNC: 108 MG/DL (ref 70–130)
GLUCOSE BLDC GLUCOMTR-MCNC: 116 MG/DL (ref 70–130)
GLUCOSE BLDC GLUCOMTR-MCNC: 95 MG/DL (ref 70–130)
GLUCOSE BLDC GLUCOMTR-MCNC: 97 MG/DL (ref 70–130)
GLUCOSE SERPL-MCNC: 119 MG/DL (ref 65–99)
HAV IGM SERPL QL IA: NORMAL
HBA1C MFR BLD: 6.1 % (ref 4.8–5.6)
HBV CORE IGM SERPL QL IA: NORMAL
HBV SURFACE AG SERPL QL IA: NORMAL
HCT VFR BLD AUTO: 34.4 % (ref 34–46.6)
HCV AB SER QL: NORMAL
HGB BLD-MCNC: 10.7 G/DL (ref 12–15.9)
IMM GRANULOCYTES # BLD AUTO: 0.06 10*3/MM3 (ref 0–0.05)
IMM GRANULOCYTES NFR BLD AUTO: 0.7 % (ref 0–0.5)
LYMPHOCYTES # BLD AUTO: 1.07 10*3/MM3 (ref 0.7–3.1)
LYMPHOCYTES NFR BLD AUTO: 11.8 % (ref 19.6–45.3)
MCH RBC QN AUTO: 30.7 PG (ref 26.6–33)
MCHC RBC AUTO-ENTMCNC: 31.1 G/DL (ref 31.5–35.7)
MCV RBC AUTO: 98.6 FL (ref 79–97)
MONOCYTES # BLD AUTO: 0.33 10*3/MM3 (ref 0.1–0.9)
MONOCYTES NFR BLD AUTO: 3.7 % (ref 5–12)
NEUTROPHILS NFR BLD AUTO: 7.46 10*3/MM3 (ref 1.7–7)
NEUTROPHILS NFR BLD AUTO: 82.5 % (ref 42.7–76)
NRBC BLD AUTO-RTO: 0 /100 WBC (ref 0–0.2)
PLATELET # BLD AUTO: 185 10*3/MM3 (ref 140–450)
PMV BLD AUTO: 9.2 FL (ref 6–12)
POTASSIUM SERPL-SCNC: 4.7 MMOL/L (ref 3.5–5.2)
PROT SERPL-MCNC: 5.6 G/DL (ref 6–8.5)
RBC # BLD AUTO: 3.49 10*6/MM3 (ref 3.77–5.28)
SODIUM SERPL-SCNC: 137 MMOL/L (ref 136–145)
WBC NRBC COR # BLD AUTO: 9.04 10*3/MM3 (ref 3.4–10.8)

## 2024-08-03 PROCEDURE — 25010000002 HEPARIN (PORCINE) PER 1000 UNITS: Performed by: HOSPITALIST

## 2024-08-03 PROCEDURE — 25010000002 VANCOMYCIN 1 G RECONSTITUTED SOLUTION 1 EACH VIAL: Performed by: HOSPITALIST

## 2024-08-03 PROCEDURE — 87086 URINE CULTURE/COLONY COUNT: CPT | Performed by: HOSPITALIST

## 2024-08-03 PROCEDURE — 99222 1ST HOSP IP/OBS MODERATE 55: CPT | Performed by: HOSPITALIST

## 2024-08-03 PROCEDURE — 87186 SC STD MICRODIL/AGAR DIL: CPT

## 2024-08-03 PROCEDURE — 25810000003 SODIUM CHLORIDE 0.9 % SOLUTION: Performed by: INTERNAL MEDICINE

## 2024-08-03 PROCEDURE — 80074 ACUTE HEPATITIS PANEL: CPT | Performed by: INTERNAL MEDICINE

## 2024-08-03 PROCEDURE — 73700 CT LOWER EXTREMITY W/O DYE: CPT

## 2024-08-03 PROCEDURE — 82948 REAGENT STRIP/BLOOD GLUCOSE: CPT

## 2024-08-03 PROCEDURE — 25010000002 ONDANSETRON PER 1 MG: Performed by: HOSPITALIST

## 2024-08-03 PROCEDURE — 25810000003 SODIUM CHLORIDE 0.9 % SOLUTION 250 ML FLEX CONT: Performed by: HOSPITALIST

## 2024-08-03 PROCEDURE — 25010000002 HYDROMORPHONE PER 4 MG: Performed by: HOSPITALIST

## 2024-08-03 PROCEDURE — 80053 COMPREHEN METABOLIC PANEL: CPT | Performed by: HOSPITALIST

## 2024-08-03 PROCEDURE — 85025 COMPLETE CBC W/AUTO DIFF WBC: CPT | Performed by: HOSPITALIST

## 2024-08-03 PROCEDURE — 74176 CT ABD & PELVIS W/O CONTRAST: CPT | Performed by: RADIOLOGY

## 2024-08-03 PROCEDURE — 99222 1ST HOSP IP/OBS MODERATE 55: CPT | Performed by: SURGERY

## 2024-08-03 PROCEDURE — 87088 URINE BACTERIA CULTURE: CPT | Performed by: HOSPITALIST

## 2024-08-03 PROCEDURE — 86140 C-REACTIVE PROTEIN: CPT | Performed by: HOSPITALIST

## 2024-08-03 PROCEDURE — 87205 SMEAR GRAM STAIN: CPT

## 2024-08-03 PROCEDURE — 25010000002 ONDANSETRON PER 1 MG: Performed by: STUDENT IN AN ORGANIZED HEALTH CARE EDUCATION/TRAINING PROGRAM

## 2024-08-03 PROCEDURE — 25010000002 METRONIDAZOLE 500 MG/100ML SOLUTION: Performed by: HOSPITALIST

## 2024-08-03 PROCEDURE — 87077 CULTURE AEROBIC IDENTIFY: CPT

## 2024-08-03 PROCEDURE — 25010000002 CEFTRIAXONE PER 250 MG: Performed by: HOSPITALIST

## 2024-08-03 PROCEDURE — 25010000002 PROCHLORPERAZINE 10 MG/2ML SOLUTION: Performed by: HOSPITALIST

## 2024-08-03 PROCEDURE — 73700 CT LOWER EXTREMITY W/O DYE: CPT | Performed by: RADIOLOGY

## 2024-08-03 PROCEDURE — 87070 CULTURE OTHR SPECIMN AEROBIC: CPT

## 2024-08-03 PROCEDURE — 99222 1ST HOSP IP/OBS MODERATE 55: CPT

## 2024-08-03 PROCEDURE — 87186 SC STD MICRODIL/AGAR DIL: CPT | Performed by: HOSPITALIST

## 2024-08-03 RX ORDER — SODIUM CHLORIDE 9 MG/ML
125 INJECTION, SOLUTION INTRAVENOUS CONTINUOUS
Status: ACTIVE | OUTPATIENT
Start: 2024-08-03 | End: 2024-08-03

## 2024-08-03 RX ORDER — HYDROMORPHONE HYDROCHLORIDE 1 MG/ML
0.5 INJECTION, SOLUTION INTRAMUSCULAR; INTRAVENOUS; SUBCUTANEOUS EVERY 4 HOURS PRN
Status: DISCONTINUED | OUTPATIENT
Start: 2024-08-03 | End: 2024-08-09

## 2024-08-03 RX ORDER — LIDOCAINE 4 G/G
1 PATCH TOPICAL DAILY PRN
Status: DISCONTINUED | OUTPATIENT
Start: 2024-08-03 | End: 2024-08-21 | Stop reason: HOSPADM

## 2024-08-03 RX ORDER — ONDANSETRON 2 MG/ML
4 INJECTION INTRAMUSCULAR; INTRAVENOUS EVERY 6 HOURS PRN
Status: DISCONTINUED | OUTPATIENT
Start: 2024-08-03 | End: 2024-08-08

## 2024-08-03 RX ORDER — ATORVASTATIN CALCIUM 40 MG/1
80 TABLET, FILM COATED ORAL NIGHTLY
Status: DISCONTINUED | OUTPATIENT
Start: 2024-08-03 | End: 2024-08-21 | Stop reason: HOSPADM

## 2024-08-03 RX ORDER — BISACODYL 10 MG
10 SUPPOSITORY, RECTAL RECTAL DAILY PRN
Status: DISCONTINUED | OUTPATIENT
Start: 2024-08-03 | End: 2024-08-21 | Stop reason: HOSPADM

## 2024-08-03 RX ORDER — ROPINIROLE 0.25 MG/1
0.5 TABLET, FILM COATED ORAL 2 TIMES DAILY
Status: DISCONTINUED | OUTPATIENT
Start: 2024-08-03 | End: 2024-08-11

## 2024-08-03 RX ORDER — AMOXICILLIN 250 MG
2 CAPSULE ORAL 2 TIMES DAILY PRN
Status: DISCONTINUED | OUTPATIENT
Start: 2024-08-03 | End: 2024-08-21 | Stop reason: HOSPADM

## 2024-08-03 RX ORDER — SODIUM CHLORIDE 0.9 % (FLUSH) 0.9 %
10 SYRINGE (ML) INJECTION AS NEEDED
Status: DISCONTINUED | OUTPATIENT
Start: 2024-08-03 | End: 2024-08-21 | Stop reason: HOSPADM

## 2024-08-03 RX ORDER — PROCHLORPERAZINE EDISYLATE 5 MG/ML
2.5 INJECTION INTRAMUSCULAR; INTRAVENOUS EVERY 6 HOURS PRN
Status: DISCONTINUED | OUTPATIENT
Start: 2024-08-03 | End: 2024-08-08

## 2024-08-03 RX ORDER — SODIUM CHLORIDE 9 MG/ML
75 INJECTION, SOLUTION INTRAVENOUS CONTINUOUS
Status: ACTIVE | OUTPATIENT
Start: 2024-08-03 | End: 2024-08-04

## 2024-08-03 RX ORDER — GLUCAGON 1 MG/ML
1 KIT INJECTION
Status: DISCONTINUED | OUTPATIENT
Start: 2024-08-03 | End: 2024-08-21

## 2024-08-03 RX ORDER — LEVOTHYROXINE SODIUM 100 UG/1
100 TABLET ORAL DAILY
COMMUNITY

## 2024-08-03 RX ORDER — NIFEDIPINE 90 MG/1
90 TABLET, EXTENDED RELEASE ORAL DAILY
COMMUNITY
End: 2024-08-21 | Stop reason: HOSPADM

## 2024-08-03 RX ORDER — HEPARIN SODIUM 5000 [USP'U]/ML
5000 INJECTION, SOLUTION INTRAVENOUS; SUBCUTANEOUS EVERY 12 HOURS SCHEDULED
Status: DISCONTINUED | OUTPATIENT
Start: 2024-08-03 | End: 2024-08-14

## 2024-08-03 RX ORDER — DEXTROSE MONOHYDRATE 25 G/50ML
25 INJECTION, SOLUTION INTRAVENOUS
Status: DISCONTINUED | OUTPATIENT
Start: 2024-08-03 | End: 2024-08-21

## 2024-08-03 RX ORDER — FLUOXETINE 40 MG/1
40 CAPSULE ORAL DAILY
COMMUNITY
End: 2024-08-21 | Stop reason: HOSPADM

## 2024-08-03 RX ORDER — PANTOPRAZOLE SODIUM 40 MG/1
40 TABLET, DELAYED RELEASE ORAL
Status: DISCONTINUED | OUTPATIENT
Start: 2024-08-04 | End: 2024-08-20

## 2024-08-03 RX ORDER — HYDROXYZINE HYDROCHLORIDE 25 MG/1
25 TABLET, FILM COATED ORAL 3 TIMES DAILY PRN
Status: DISCONTINUED | OUTPATIENT
Start: 2024-08-03 | End: 2024-08-11

## 2024-08-03 RX ORDER — SODIUM CHLORIDE 9 MG/ML
40 INJECTION, SOLUTION INTRAVENOUS AS NEEDED
Status: DISCONTINUED | OUTPATIENT
Start: 2024-08-03 | End: 2024-08-21 | Stop reason: HOSPADM

## 2024-08-03 RX ORDER — METRONIDAZOLE 500 MG/100ML
500 INJECTION, SOLUTION INTRAVENOUS EVERY 8 HOURS
Status: COMPLETED | OUTPATIENT
Start: 2024-08-03 | End: 2024-08-07

## 2024-08-03 RX ORDER — FLUTICASONE PROPIONATE 50 MCG
2 SPRAY, SUSPENSION (ML) NASAL DAILY PRN
COMMUNITY

## 2024-08-03 RX ORDER — SODIUM CHLORIDE 0.9 % (FLUSH) 0.9 %
10 SYRINGE (ML) INJECTION EVERY 12 HOURS SCHEDULED
Status: DISCONTINUED | OUTPATIENT
Start: 2024-08-03 | End: 2024-08-21 | Stop reason: HOSPADM

## 2024-08-03 RX ORDER — NIFEDIPINE 30 MG/1
90 TABLET, EXTENDED RELEASE ORAL DAILY
Status: DISCONTINUED | OUTPATIENT
Start: 2024-08-03 | End: 2024-08-11

## 2024-08-03 RX ORDER — LOSARTAN POTASSIUM 50 MG/1
50 TABLET ORAL DAILY
Status: DISCONTINUED | OUTPATIENT
Start: 2024-08-03 | End: 2024-08-11

## 2024-08-03 RX ORDER — LEVOTHYROXINE SODIUM 50 UG/1
50 TABLET ORAL DAILY
Status: ON HOLD | COMMUNITY
End: 2024-08-03

## 2024-08-03 RX ORDER — FERROUS SULFATE 325(65) MG
325 TABLET ORAL
Status: DISCONTINUED | OUTPATIENT
Start: 2024-08-04 | End: 2024-08-21 | Stop reason: HOSPADM

## 2024-08-03 RX ORDER — POTASSIUM CHLORIDE 1500 MG/1
20 TABLET, EXTENDED RELEASE ORAL 2 TIMES DAILY
Status: DISCONTINUED | OUTPATIENT
Start: 2024-08-03 | End: 2024-08-08

## 2024-08-03 RX ORDER — CLONAZEPAM 0.5 MG/1
0.5 TABLET ORAL 2 TIMES DAILY PRN
Status: DISPENSED | OUTPATIENT
Start: 2024-08-03 | End: 2024-08-08

## 2024-08-03 RX ORDER — ONDANSETRON 2 MG/ML
4 INJECTION INTRAMUSCULAR; INTRAVENOUS ONCE
Status: COMPLETED | OUTPATIENT
Start: 2024-08-03 | End: 2024-08-03

## 2024-08-03 RX ORDER — ONDANSETRON 4 MG/1
4 TABLET, ORALLY DISINTEGRATING ORAL 3 TIMES DAILY PRN
Status: DISCONTINUED | OUTPATIENT
Start: 2024-08-03 | End: 2024-08-08

## 2024-08-03 RX ORDER — LACOSAMIDE 50 MG/1
50 TABLET ORAL EVERY 12 HOURS SCHEDULED
COMMUNITY

## 2024-08-03 RX ORDER — LIDOCAINE 50 MG/G
1 PATCH TOPICAL DAILY PRN
COMMUNITY

## 2024-08-03 RX ORDER — TRAZODONE HYDROCHLORIDE 50 MG/1
50 TABLET, FILM COATED ORAL NIGHTLY
Status: DISCONTINUED | OUTPATIENT
Start: 2024-08-03 | End: 2024-08-10

## 2024-08-03 RX ORDER — POTASSIUM CHLORIDE 1500 MG/1
20 TABLET, EXTENDED RELEASE ORAL 2 TIMES DAILY
COMMUNITY

## 2024-08-03 RX ORDER — FERROUS SULFATE 325(65) MG
325 TABLET ORAL
COMMUNITY

## 2024-08-03 RX ORDER — BISACODYL 5 MG/1
5 TABLET, DELAYED RELEASE ORAL DAILY PRN
Status: DISCONTINUED | OUTPATIENT
Start: 2024-08-03 | End: 2024-08-21 | Stop reason: HOSPADM

## 2024-08-03 RX ORDER — LOSARTAN POTASSIUM 50 MG/1
50 TABLET ORAL DAILY
COMMUNITY
End: 2024-08-21 | Stop reason: HOSPADM

## 2024-08-03 RX ORDER — ASPIRIN 81 MG/1
81 TABLET ORAL DAILY
COMMUNITY
End: 2024-08-21 | Stop reason: HOSPADM

## 2024-08-03 RX ORDER — ASPIRIN 81 MG/1
81 TABLET ORAL DAILY
Status: DISCONTINUED | OUTPATIENT
Start: 2024-08-03 | End: 2024-08-14

## 2024-08-03 RX ORDER — POLYETHYLENE GLYCOL 3350 17 G/17G
17 POWDER, FOR SOLUTION ORAL DAILY PRN
Status: DISCONTINUED | OUTPATIENT
Start: 2024-08-03 | End: 2024-08-21 | Stop reason: HOSPADM

## 2024-08-03 RX ORDER — HYDROCODONE BITARTRATE AND ACETAMINOPHEN 10; 325 MG/1; MG/1
1 TABLET ORAL 3 TIMES DAILY PRN
Status: DISCONTINUED | OUTPATIENT
Start: 2024-08-03 | End: 2024-08-09

## 2024-08-03 RX ORDER — INSULIN ASPART INJECTION 100 [IU]/ML
2-7 INJECTION, SOLUTION SUBCUTANEOUS
COMMUNITY

## 2024-08-03 RX ORDER — CEFDINIR 300 MG/1
300 CAPSULE ORAL EVERY OTHER DAY
COMMUNITY
End: 2024-08-21 | Stop reason: HOSPADM

## 2024-08-03 RX ORDER — LEVOTHYROXINE SODIUM 100 UG/1
100 TABLET ORAL
Status: DISCONTINUED | OUTPATIENT
Start: 2024-08-04 | End: 2024-08-21 | Stop reason: HOSPADM

## 2024-08-03 RX ORDER — FLUTICASONE PROPIONATE 50 MCG
2 SPRAY, SUSPENSION (ML) NASAL DAILY PRN
Status: DISCONTINUED | OUTPATIENT
Start: 2024-08-03 | End: 2024-08-21 | Stop reason: HOSPADM

## 2024-08-03 RX ORDER — NITROGLYCERIN 0.4 MG/1
0.4 TABLET SUBLINGUAL
Status: DISCONTINUED | OUTPATIENT
Start: 2024-08-03 | End: 2024-08-21 | Stop reason: HOSPADM

## 2024-08-03 RX ORDER — LACOSAMIDE 50 MG/1
50 TABLET ORAL EVERY 12 HOURS SCHEDULED
Status: DISCONTINUED | OUTPATIENT
Start: 2024-08-03 | End: 2024-08-21 | Stop reason: HOSPADM

## 2024-08-03 RX ORDER — NICOTINE POLACRILEX 4 MG
15 LOZENGE BUCCAL
Status: DISCONTINUED | OUTPATIENT
Start: 2024-08-03 | End: 2024-08-21

## 2024-08-03 RX ORDER — FOLIC ACID 1 MG/1
1 TABLET ORAL DAILY
Status: DISCONTINUED | OUTPATIENT
Start: 2024-08-03 | End: 2024-08-21 | Stop reason: HOSPADM

## 2024-08-03 RX ADMIN — TRAZODONE HYDROCHLORIDE 50 MG: 50 TABLET ORAL at 20:43

## 2024-08-03 RX ADMIN — FOLIC ACID 1 MG: 1 TABLET ORAL at 18:20

## 2024-08-03 RX ADMIN — POTASSIUM CHLORIDE 20 MEQ: 1500 TABLET, EXTENDED RELEASE ORAL at 20:43

## 2024-08-03 RX ADMIN — SODIUM CHLORIDE 1000 MG: 9 INJECTION, SOLUTION INTRAVENOUS at 03:56

## 2024-08-03 RX ADMIN — SODIUM CHLORIDE 500 ML: 9 INJECTION, SOLUTION INTRAVENOUS at 13:56

## 2024-08-03 RX ADMIN — ONDANSETRON 4 MG: 2 INJECTION INTRAMUSCULAR; INTRAVENOUS at 14:08

## 2024-08-03 RX ADMIN — SODIUM CHLORIDE 125 ML/HR: 9 INJECTION, SOLUTION INTRAVENOUS at 20:42

## 2024-08-03 RX ADMIN — TIZANIDINE 4 MG: 4 TABLET ORAL at 20:43

## 2024-08-03 RX ADMIN — ASPIRIN 81 MG: 81 TABLET, COATED ORAL at 18:20

## 2024-08-03 RX ADMIN — ONDANSETRON 4 MG: 2 INJECTION INTRAMUSCULAR; INTRAVENOUS at 04:52

## 2024-08-03 RX ADMIN — ATORVASTATIN CALCIUM 80 MG: 40 TABLET, FILM COATED ORAL at 20:43

## 2024-08-03 RX ADMIN — METRONIDAZOLE 500 MG: 500 INJECTION, SOLUTION INTRAVENOUS at 18:21

## 2024-08-03 RX ADMIN — Medication 10 ML: at 20:43

## 2024-08-03 RX ADMIN — FLUOXETINE HYDROCHLORIDE 40 MG: 20 CAPSULE ORAL at 18:20

## 2024-08-03 RX ADMIN — SODIUM CHLORIDE 125 ML/HR: 9 INJECTION, SOLUTION INTRAVENOUS at 14:20

## 2024-08-03 RX ADMIN — HYDROCODONE BITARTRATE AND ACETAMINOPHEN 1 TABLET: 10; 325 TABLET ORAL at 18:39

## 2024-08-03 RX ADMIN — ROPINIROLE HYDROCHLORIDE 0.5 MG: 0.25 TABLET, FILM COATED ORAL at 20:43

## 2024-08-03 RX ADMIN — NIFEDIPINE 90 MG: 30 TABLET, FILM COATED, EXTENDED RELEASE ORAL at 18:39

## 2024-08-03 RX ADMIN — HEPARIN SODIUM 5000 UNITS: 5000 INJECTION INTRAVENOUS; SUBCUTANEOUS at 20:43

## 2024-08-03 RX ADMIN — HYDROMORPHONE HYDROCHLORIDE 0.5 MG: 1 INJECTION, SOLUTION INTRAMUSCULAR; INTRAVENOUS; SUBCUTANEOUS at 20:43

## 2024-08-03 RX ADMIN — PROCHLORPERAZINE EDISYLATE 2.5 MG: 5 INJECTION INTRAMUSCULAR; INTRAVENOUS at 09:28

## 2024-08-03 RX ADMIN — METRONIDAZOLE 500 MG: 500 INJECTION, SOLUTION INTRAVENOUS at 12:06

## 2024-08-03 RX ADMIN — Medication 10 ML: at 09:29

## 2024-08-03 RX ADMIN — METRONIDAZOLE 500 MG: 500 INJECTION, SOLUTION INTRAVENOUS at 03:08

## 2024-08-03 RX ADMIN — LACOSAMIDE 50 MG: 50 TABLET, FILM COATED ORAL at 20:42

## 2024-08-03 RX ADMIN — HEPARIN SODIUM 5000 UNITS: 5000 INJECTION INTRAVENOUS; SUBCUTANEOUS at 09:29

## 2024-08-03 RX ADMIN — ONDANSETRON 4 MG: 2 INJECTION INTRAMUSCULAR; INTRAVENOUS at 03:08

## 2024-08-03 RX ADMIN — CLONAZEPAM 0.5 MG: 0.5 TABLET ORAL at 20:08

## 2024-08-03 RX ADMIN — CEFTRIAXONE 1000 MG: 1 INJECTION, POWDER, FOR SOLUTION INTRAMUSCULAR; INTRAVENOUS at 20:44

## 2024-08-03 RX ADMIN — LOSARTAN POTASSIUM 50 MG: 50 TABLET, FILM COATED ORAL at 18:20

## 2024-08-03 NOTE — CONSULTS
Patient Name:  Reny Elena  YOB: 1960  [id number]       Patient Care Team:  Susan Stephens APRN as PCP - General (Nurse Practitioner)  Lauren Centeno, RN as Registered Nurse (Wound Care)      General Surgery Consult Note     Date of Consultation: 08/03/24    Consulting Physician - Mark Crawford, *    Reason for Consult -right heel ulcer    Subjective     I have been asked to see  Reny Elena , a 64 y.o. nonambulatory female with end-stage renal disease on peritoneal dialysis in consultation for right heel ulcer.  She reports this been present for at least several weeks.  The patient is nonambulatory.  She presented with primary complaint of vomiting.  Even in the room today she reports nausea and dry heaves.  Denies abdominal pain.      Allergy:   Allergies   Allergen Reactions    Morphine Nausea And Vomiting    Penicillins Hives and GI Intolerance     Patient has received cefazolin, ceftriaxone and cefepime during past admissions.    Codeine Hives, Rash and GI Intolerance     Pt tolerates [place] @ home    Sulfa Antibiotics Hives, Rash and GI Intolerance     NAUSEA TOO       Medications:  cefTRIAXone, 1,000 mg, Intravenous, Q24H  heparin (porcine), 5,000 Units, Subcutaneous, Q12H  insulin regular, 2-7 Units, Subcutaneous, Q6H  metroNIDAZOLE, 500 mg, Intravenous, Q8H  sodium chloride, 500 mL, Intravenous, Once  sodium chloride, 10 mL, Intravenous, Q12H  [START ON 8/4/2024] Vancomycin Pharmacy Intermittent/Pulse Dosing, , Does not apply, Daily      sodium chloride, 125 mL/hr   Followed by  sodium chloride, 75 mL/hr      No current facility-administered medications on file prior to encounter.     Current Outpatient Medications on File Prior to Encounter   Medication Sig    aspirin 81 MG EC tablet Take 1 tablet by mouth Daily.    cefdinir (OMNICEF) 300 MG capsule Take 1 capsule by mouth Every Other Day.    ferrous sulfate 325 (65 FE) MG tablet Take 1 tablet by mouth Daily With  Breakfast.    FLUoxetine (PROzac) 40 MG capsule Take 1 capsule by mouth Daily.    fluticasone (FLONASE) 50 MCG/ACT nasal spray 2 sprays into the nostril(s) as directed by provider Daily As Needed for Rhinitis.    folic acid (FOLVITE) 1 MG tablet Take 1 tablet by mouth Daily.    HYDROcodone-acetaminophen (NORCO)  MG per tablet Take 1 tablet by mouth 3 (Three) Times a Day As Needed for Moderate Pain.    hydrOXYzine (ATARAX) 25 MG tablet Take 1 tablet by mouth 3 (Three) Times a Day As Needed for Anxiety.    Insulin Aspart, w/Niacinamide, (Fiasp) 100 UNIT/ML solution Inject 2-7 Units as directed 4 (Four) Times a Day Before Meals & at Bedtime.    lacosamide (VIMPAT) 50 MG tablet tablet Take 1 tablet by mouth Every 12 (Twelve) Hours.    levothyroxine (SYNTHROID, LEVOTHROID) 100 MCG tablet Take 1 tablet by mouth Daily.    lidocaine (LIDODERM) 5 % Place 1 patch on the skin as directed by provider Daily As Needed for Mild Pain. Remove & Discard patch within 12 hours or as directed by MD    losartan (COZAAR) 50 MG tablet Take 1 tablet by mouth Daily.    NIFEdipine XL (PROCARDIA XL) 90 MG 24 hr tablet Take 1 tablet by mouth Daily.    ondansetron ODT (ZOFRAN-ODT) 4 MG disintegrating tablet Place 1 tablet on the tongue 3 (Three) Times a Day As Needed for Nausea or Vomiting.    pantoprazole (PROTONIX) 40 MG EC tablet Take 1 tablet by mouth Daily.    potassium chloride (KLOR-CON M20) 20 MEQ CR tablet Take 1 tablet by mouth 2 (Two) Times a Day.    rOPINIRole (REQUIP) 0.5 MG tablet Take 1 tablet by mouth 2 (Two) Times a Day.    tiZANidine (ZANAFLEX) 4 MG tablet Take 1 tablet by mouth Every 6 (Six) Hours As Needed for Muscle Spasms.    [DISCONTINUED] insulin degludec (TRESIBA FLEXTOUCH) 100 UNIT/ML solution pen-injector injection Inject 20 Units under the skin into the appropriate area as directed Daily.    [DISCONTINUED] levothyroxine (SYNTHROID, LEVOTHROID) 50 MCG tablet Take 1 tablet by mouth Daily.    atorvastatin (LIPITOR)  "80 MG tablet Take 1 tablet by mouth Every Night.    traZODone (DESYREL) 50 MG tablet Take 1 tablet by mouth Every Night.    [DISCONTINUED] Alcohol Swabs (Alcohol Pads) 70 % pads Apply one alcohol swab to injection site of skin immediately prior to insulin injection. Formulary Compliance Approval.    [DISCONTINUED] carvedilol (COREG) 6.25 MG tablet Take 1 tablet by mouth 2 (Two) Times a Day With Meals.    [DISCONTINUED] fluticasone (FLONASE) 50 MCG/ACT nasal spray 2 sprays into the nostril(s) as directed by provider Daily As Needed for Allergies.    [DISCONTINUED] glucose blood test strip Use to test blood glucose up to four times daily as needed. Formulary Compliance Approval. Diagnosis: Type 2 Diabetes - Insulin Dependent    [DISCONTINUED] glucose monitor monitoring kit Use to test blood glucose up to four times daily as needed. Formulary Compliance Approval. Diagnosis: Type 2 Diabetes - Insulin Dependent    [DISCONTINUED] Insulin Syringe 30G X 1/2\" 0.5 ML misc Inject 1 each under the skin into the appropriate area as directed 4 (Four) Times a Day As Needed (for insulin injections). Formulary Compliance Approval    [DISCONTINUED] lacosamide (VIMPAT) 50 MG tablet tablet Take 1 tablet by mouth Every 12 (Twelve) Hours for 30 days.    [DISCONTINUED] Lancets misc Use to test blood glucose up to four times daily as needed. Formulary Compliance Approval. Diagnosis: Type 2 Diabetes - Insulin Dependent    [DISCONTINUED] levothyroxine (SYNTHROID, LEVOTHROID) 100 MCG tablet Take 1 tablet by mouth Every Morning.    [DISCONTINUED] loperamide (IMODIUM A-D) 2 MG tablet Take 1 tablet by mouth Daily As Needed for Diarrhea.    [DISCONTINUED] NIFEdipine XL (PROCARDIA XL) 90 MG 24 hr tablet Take 1 tablet by mouth Daily for 30 days.    [DISCONTINUED] vitamin D (ERGOCALCIFEROL) 1.25 MG (24670 UT) capsule capsule Take 1 capsule by mouth 1 (One) Time Per Week.       PMHx:   Past Medical History:   Diagnosis Date    Arthritis     Closed " "fracture of right fibula and tibia 12/25/2018    Depression     Diabetic ulcer of left foot associated with type 2 diabetes mellitus 6/23/2017    Diastolic dysfunction     Disease of thyroid gland     Elevated cholesterol     End stage renal disease     Essential hypertension     GERD (gastroesophageal reflux disease)     History of transfusion     Hyperlipidemia     Iron deficiency anemia 12/30/2018    PAD (peripheral artery disease)     Renal failure     Type 2 diabetes mellitus with hyperglycemia, with long-term current use of insulin 12/30/2018         Past Surgical History:  Cervical laminectomy  Peritoneal dialysis catheter placement  Hemodialysis catheter placement  Right leg fixation    Family History:    Heart disease Mother      Cancer Mother      COPD Mother      Diabetes Other      Depression Other        Social History: Non-smoker     Review of Systems   14 point review of systems negative except for what is noted in HPI             Objective     Physical Exam:      Vital Signs  /76 (BP Location: Right arm, Patient Position: Lying)   Pulse 110   Temp 98.6 °F (37 °C) (Oral)   Resp 20   Ht 162.6 cm (64\")   Wt 48 kg (105 lb 13.1 oz)   LMP 05/26/2005   SpO2 98%   BMI 18.16 kg/m²   No intake or output data in the 24 hours ending 08/03/24 1302      Physical Exam:      08/02/24  1734 08/03/24  0429   Weight: 45.4 kg (100 lb) 48 kg (105 lb 13.1 oz)    Body mass index is 18.16 kg/m².  Constitution: No acute distress.   Head: Normocephalic, atraumatic.   Eyes: Aligned without strabismus. Conjunctiva noninjected   Ears, Nose, Mouth: , No lesions appreciated   Respiratory: Symmetric chest expansion. No respiratory distress.   Gastrointestinal:  soft, nondistended, nontender  Skin: Flexion contractures of the bilateral ankles.  Large wound on the right heel with palpable bone at the base  Neurologic: No gross deficits.  Alert and oriented x3  Psychiatric: Normal mood        Results Review: I have " personally reviewed all of the recent lab and imaging results available at this time.     CT personally reviewed.  There is a soft tissue defect of the right heel.  There is a large piece of indwelling hardware throughout the right lower leg.No overt bony changes of the heel, within limits of CT            Assessment and Plan:    64 y.o. nonambulatory female, history of end-stage renal disease on dialysis, with right heel stage IV pressure wound with exposed bone, probable osteomyelitis    Since the patient is nonambulatory and has hardware throughout the right lower leg.  I feel the patient's only option is a right above-knee amputation  Tentative plan for Monday      This document has been electronically signed by Angelo Santoro MD   August 3, 2024 13:02 EDT    [facility]

## 2024-08-03 NOTE — PROGRESS NOTES
Nutrition Services    Patient Name:  Reny Elena  YOB: 1960  MRN: [id number]  Admit Date:  8/2/2024      Malnutrition Severity Assessment      Patient meets criteria for : Severe Malnutrition  Malnutrition Type (Last 8 Hours)       Malnutrition Severity Assessment       Row Name 08/03/24 1148       Malnutrition Severity Assessment    Malnutrition Type Chronic Disease - Related Malnutrition      Row Name 08/03/24 1148       Insufficient Energy Intake     Insufficient Energy Intake Findings Severe    Insufficient Energy Intake  <75% of est. energy requirement for > or equal to 1 month      Row Name 08/03/24 1148       Unintentional Weight Loss     Unintentional Weight Loss Findings Severe    Unintentional Weight Loss  Weight loss of 7.5% in three months      Row Name 08/03/24 1148       Muscle Loss    Loss of Muscle Mass Findings Severe    [place] Severe - deep hollowing/scooping, lack of muscle to touch, facial bones well defined    Clavicle Bone Region Severe - protruding prominent bone    Acromion Bone Region Severe - squared shoulders, bones, and acromion process protrusion prominent    Scapular Bone Region Severe - prominent bones, depressions easily visible between ribs, scapula, spine, shoulders    Dorsal Hand Region Severe - prominent depression    Patellar Region Severe - prominent bone, square looking, very little muscle definition    Anterior Thigh Region Severe - line/depression along thigh, obviously thin    Posterior Calf Region Severe - thin with very little definition/firmness      Row Name 08/03/24 1148       Fat Loss    Subcutaneous Fat Loss Findings Severe    Orbital Region  Severe - pronounced hollowness/depression, dark circles, loose saggy skin    Upper Arm Region Severe - mostly skin, very little space between folds, fingers touch    Thoracic & Lumbar Region Severe - ribs visible with prominent depressions, iliac crest very prominent      Row Name 08/03/24 1148        Declining Functional Status    Declining Functional Status Findings Measurably Reduced      Row Name 08/03/24 1148       Criteria Met (Must meet criteria for severity in at least 2 of these categories: M Wasting, Fat Loss, Fluid, Secondary Signs, Wt. Status, Intake)    Patient meets criteria for  Severe Malnutrition                         Electronically signed by:  Blessing Ch RD  08/03/24 11:51 EDT

## 2024-08-03 NOTE — CONSULTS
INFECTIOUS DISEASE CONSULTATION REPORT        Patient Identification:  Name:  Reny Elena  Age:  64 y.o.  Sex:  female  :  1960  MRN:  [id number]   Visit Number:  [id number]  Primary Care Physician:  Susan Stephens APRN        Referring Provider:  Dr. Crawford    Reason for consult: Antibiotic management       LOS: 0 days        Subjective       Subjective     History of present illness:      Thank you Dr. Crawford for allowing us to participate in the care of your patient.  As you well know, Ms. Reny Elena is a 64 y.o. female with past medical history significant for seizure disorder, type 2 diabetes, arthritis, CHF, hypothyroidism, hypertension, hyperlipidemia, GERD, PAD, ESRD, iron deficiency anemia,, who presented to [facility] Emergency Department on 2024 for nausea, vomiting, diarrhea and worsening pain of right diabetic foot wound.  Patient was recently seen in our ED on 2024 with complaints of hallucinations, fever, and right foot pain.  At this time, patient was diagnosed with right heel ulcer and UTI.  She was sent home with an antibiotic but was unsure of the name.  Patient reported she started having nausea and vomiting after taking the antibiotics so she stopped taking them.  However, she continued to have nausea and vomiting.      Patient was tachycardic in the ED.Labs show K+ at upper limit of normal, BUN 30, cr 4.36, glucose 176, alk phos 323 and albumin 2.9, otherwise normal LFTs. CRP mildly elevated at 2.34, while lactate and WBC are normal. UA shows large leuk esterase and unable to determine RBC, WBC, bacteria of squamous cells due to loaded field.  Patient's urine culture from 2024 grew E. coli.  She had also been following with wound care who it obtained a wound culture on the same day.  This culture finalized with mixed gram-negative maurisio and light growth of Enterococcus faecalis.      X-ray of the right foot performed on 2024 revealed  partial amputation of the fifth metatarsal. Bony demineralization. No acute fracture or dislocation. No lytic or blastic bony lesions seen. Diffuse interphalangeal joint space loss. Mild degenerative changes in the midfoot. No cortical erosion. Fixation timur in the tibia/talus/calcaneus noted. Diffuse soft tissue swelling. Vascular calcifications. No soft tissue gas.  CT abdomen/pelvis revealed peritoneal dialysis catheter noted in the anterior right abdominal wall with the catheter noted to terminate in the left lateral pelvis. Small volume of free fluid in the right upper quadrant and lower posterior pelvis and right lower quadrant. Slightly elevated left hemidiaphragm. No bowel or renal obstruction. No abdominal aortic aneurysm. No features of acute appendicitis. Stranding identified in the presacral space could present changes related to mild distal colitis. Fluid-filled bladder with small volume of air in the bladder lumen. No pneumatosis. No conclusive features of free air. Distended configuration to the gallbladder likely due to fasting. No conclusive features of gastritis or enteritis.      Patient continued to have nausea and vomiting after 2 doses of Zofran.  She was admitted for further treatment.  Subsequently, patient was started on IV Rocephin in the setting of E. coli UTI.  Flagyl was added to cover possible colitis.  Patient was also started on IV vancomycin for Enterococcus faecalis wound infection.  General surgery consulted for possible debridement of patient's wound.    Patient is laying in bed resting comfortably this morning.  She is complaining of nausea and vomiting this morning.  She is on room air without apparent distress.  She is afebrile.  She denies diarrhea, dysuria, increased frequency and hesitation with urination.  Lungs are clear to auscultation bilaterally.  Abdomen soft and nondistended to palpation.  Active bowel sounds in all quads.  CRP slightly elevated at 1.83.  White blood  cell count is stable and within normal range at 9.04.  New urinalysis is currently pending.  Lactate was normal at 1.1.  Blood cultures are currently pending.  Right foot wound examined.      Infectious Disease consultation was requested for antimicrobial management.      ---------------------------------------------------------------------------------------------------------------------     Review Of Systems:    Constitutional: no fever, chills and night sweats. No appetite change or unexpected weight change. No fatigue.  Eyes: no eye drainage, itching or redness.  HEENT: no mouth sores, dysphagia or nose bleed.  Respiratory: no for shortness of breath, cough or production of sputum.  Cardiovascular: no chest pain, no palpitations, no orthopnea.  Gastrointestinal: c/o nausea and vomiting. no diarrhea. No abdominal pain, hematemesis or rectal bleeding.  Genitourinary: no dysuria, polyuria or hesitancy with urination  Hematologic/lymphatic: no lymph node abnormalities, no easy bruising or easy bleeding.  Musculoskeletal: no muscle or joint pain.  Skin: R foot wound. No rash and no itching.  Neurological: no loss of consciousness, no seizure, no headache.  Behavioral/Psych: no depression or suicidal ideation.  Endocrine: no hot flashes.  Immunologic: negative.    ---------------------------------------------------------------------------------------------------------------------     Past Medical History    Past Medical History:   Diagnosis Date    Arthritis     Closed fracture of right fibula and tibia 12/25/2018    Depression     Diabetic ulcer of left foot associated with type 2 diabetes mellitus 6/23/2017    Diastolic dysfunction     Disease of thyroid gland     Elevated cholesterol     End stage renal disease     Essential hypertension     GERD (gastroesophageal reflux disease)     History of transfusion     Hyperlipidemia     Iron deficiency anemia 12/30/2018    PAD (peripheral artery disease)     Renal failure      Type 2 diabetes mellitus with hyperglycemia, with long-term current use of insulin 2018       Past Surgical History    Past Surgical History:   Procedure Laterality Date    ABDOMINAL SURGERY      BACK SURGERY      Post spinal diskectomy, osteophytectomy in one lumbar interspace    CATARACT EXTRACTION      Left      SECTION      DENTAL PROCEDURE      ENDOSCOPY      FRACTURE SURGERY      right leg    HYSTERECTOMY      INCISION AND DRAINAGE LEG Left 4/15/2017    Procedure: INCISION AND DRAINAGE LOWER EXTREMITY;  Surgeon: Basilio Morris MD;  Location: [facility];  Service:     INCISION AND DRAINAGE LEG Left 2017    Procedure: Irrigation and Debridment abcess diabetic wound left foot with deep culture;  Surgeon: Basilio Morris MD;  Location: [facility];  Service:     INSERTION PERITONEAL DIALYSIS CATHETER N/A 2021    Procedure: INSERTION PERITONEAL DIALYSIS CATHETER LAPAROSCOPIC;  Surgeon: Edy Glover MD;  Location: [facility];  Service: General;  Laterality: N/A;    KNEE ARTHROSCOPY Right     ORIF TIBIA FRACTURE Right 2018    Procedure: TIBIAL  FRACTURE OPEN REDUCTION INTERNAL FIXATION;  Surgeon: Jung Barragan MD;  Location: [facility];  Service: Orthopedics    TOE AMPUTATION Right     5th    TUBAL ABDOMINAL LIGATION         Family History    Family History   Problem Relation Age of Onset    Heart disease Mother     Cancer Mother     COPD Mother     Diabetes Other     Depression Other        Social History    Social History     Tobacco Use    Smoking status: Never     Passive exposure: Never    Smokeless tobacco: Never   Vaping Use    Vaping status: Never Used   Substance Use Topics    Alcohol use: No    Drug use: No       Allergies    Morphine, Penicillins, Codeine, and Sulfa antibiotics  ---------------------------------------------------------------------------------------------------------------------     Home Medications:    Prior to Admission Medications       Prescriptions  Last Dose Informant Patient Reported? Taking?    aspirin 81 MG EC tablet 8/2/2024 Other Yes Yes    Take 1 tablet by mouth Daily.    cefdinir (OMNICEF) 300 MG capsule 8/2/2024 Other Yes Yes    Take 1 capsule by mouth Every Other Day.    ferrous sulfate 325 (65 FE) MG tablet 8/2/2024 Other Yes Yes    Take 1 tablet by mouth Daily With Breakfast.    FLUoxetine (PROzac) 40 MG capsule 8/2/2024 Other Yes Yes    Take 1 capsule by mouth Daily.    fluticasone (FLONASE) 50 MCG/ACT nasal spray Past Week Other Yes Yes    2 sprays into the nostril(s) as directed by provider Daily As Needed for Rhinitis.    folic acid (FOLVITE) 1 MG tablet 8/2/2024 Other Yes Yes    Take 1 tablet by mouth Daily.    HYDROcodone-acetaminophen (NORCO)  MG per tablet 8/2/2024 Other Yes Yes    Take 1 tablet by mouth 3 (Three) Times a Day As Needed for Moderate Pain.    hydrOXYzine (ATARAX) 25 MG tablet 8/2/2024 Other No Yes    Take 1 tablet by mouth 3 (Three) Times a Day As Needed for Anxiety.    Insulin Aspart, w/Niacinamide, (Fiasp) 100 UNIT/ML solution 8/2/2024 Other Yes Yes    Inject 2-7 Units as directed 4 (Four) Times a Day Before Meals & at Bedtime.    lacosamide (VIMPAT) 50 MG tablet tablet 8/2/2024 Other Yes Yes    Take 1 tablet by mouth Every 12 (Twelve) Hours.    levothyroxine (SYNTHROID, LEVOTHROID) 100 MCG tablet 8/2/2024 Other Yes Yes    Take 1 tablet by mouth Daily.    lidocaine (LIDODERM) 5 % Past Week Other Yes Yes    Place 1 patch on the skin as directed by provider Daily As Needed for Mild Pain. Remove & Discard patch within 12 hours or as directed by MD    losartan (COZAAR) 50 MG tablet 8/2/2024 Other Yes Yes    Take 1 tablet by mouth Daily.    NIFEdipine XL (PROCARDIA XL) 90 MG 24 hr tablet 8/2/2024 Other Yes Yes    Take 1 tablet by mouth Daily.    ondansetron ODT (ZOFRAN-ODT) 4 MG disintegrating tablet Past Week Other Yes Yes    Place 1 tablet on the tongue 3 (Three) Times a Day As Needed for Nausea or Vomiting.     pantoprazole (PROTONIX) 40 MG EC tablet 8/2/2024 Other Yes Yes    Take 1 tablet by mouth Daily.    potassium chloride (KLOR-CON M20) 20 MEQ CR tablet 8/2/2024 Other Yes Yes    Take 1 tablet by mouth 2 (Two) Times a Day.    rOPINIRole (REQUIP) 0.5 MG tablet 8/2/2024 Other Yes Yes    Take 1 tablet by mouth 2 (Two) Times a Day.    tiZANidine (ZANAFLEX) 4 MG tablet 8/2/2024 Other Yes Yes    Take 1 tablet by mouth Every 6 (Six) Hours As Needed for Muscle Spasms.    atorvastatin (LIPITOR) 80 MG tablet 8/1/2024 Other No No    Take 1 tablet by mouth Every Night.    traZODone (DESYREL) 50 MG tablet 8/1/2024 Other Yes No    Take 1 tablet by mouth Every Night.          ---------------------------------------------------------------------------------------------------------------------    Objective       Objective     Hospital Scheduled Meds:  cefTRIAXone, 1,000 mg, Intravenous, Q24H  heparin (porcine), 5,000 Units, Subcutaneous, Q12H  insulin regular, 2-7 Units, Subcutaneous, Q6H  metroNIDAZOLE, 500 mg, Intravenous, Q8H  sodium chloride, 10 mL, Intravenous, Q12H  [START ON 8/4/2024] Vancomycin Pharmacy Intermittent/Pulse Dosing, , Does not apply, Daily         ---------------------------------------------------------------------------------------------------------------------   Vital Signs:  Temp:  [97.5 °F (36.4 °C)-98.5 °F (36.9 °C)] 98.5 °F (36.9 °C)  Heart Rate:  [] 102  Resp:  [14-18] 18  BP: (108-154)/(55-89) 142/76  Mean Arterial Pressure (Non-Invasive) for the past 24 hrs (Last 3 readings):   Noninvasive MAP (mmHg)   08/03/24 0700 107   08/03/24 0429 90   08/03/24 0330 104     SpO2 Percentage    08/03/24 0330 08/03/24 0429 08/03/24 0700   SpO2: 96% 99% 99%     SpO2:  [91 %-100 %] 99 %  on   ;   Device (Oxygen Therapy): room air    Body mass index is 18.16 kg/m².  Wt Readings from Last 3 Encounters:   08/03/24 48 kg (105 lb 13.1 oz)   07/26/24 45.4 kg (100 lb)   07/26/24 51.7 kg (114 lb)      ---------------------------------------------------------------------------------------------------------------------     Physical Exam:    Constitutional: Frail elderly female laying in bed resting comfortably this morning.  She is afebrile.  Complaining of nausea and vomiting.  On room air without apparent distress.  No issues overnight.  HENT:  Head: Normocephalic and atraumatic.  Mouth:  Moist mucous membranes.    Eyes:  Conjunctivae and EOM are normal.  No scleral icterus.  Neck:  Neck supple.  No JVD present.    Cardiovascular:  Normal rate, regular rhythm and normal heart sounds with no murmur. No edema.  Pulmonary/Chest: On room air without apparent distress.  Lungs are clear to auscultation bilaterally.  No respiratory distress, no wheezes, no crackles, with normal breath sounds and good air movement.  Abdominal:  Soft.  Bowel sounds are normal.  No distension and no tenderness.   Musculoskeletal: Right foot wound with dressing in place.  No edema, no tenderness, and no deformity.  No swelling or redness of joints.  Neurological:  Alert and oriented to person, place, and time.  No facial droop.  No slurred speech.   Skin: Right foot wound with dressing in place.  Skin is warm and dry.  No rash noted.  No pallor.   Psychiatric:  Normal mood and affect.  Behavior is normal.    ---------------------------------------------------------------------------------------------------------------------              Results from last 7 days   Lab Units 08/03/24  0534 08/02/24 1916   CRP mg/dL 1.83* 2.25*  2.34*   LACTATE mmol/L  --  1.1   WBC 10*3/mm3 9.04 9.44   HEMOGLOBIN g/dL 10.7* 13.0   HEMATOCRIT % 34.4 40.5   MCV fL 98.6* 96.2   MCHC g/dL 31.1* 32.1   PLATELETS 10*3/mm3 185 251     Results from last 7 days   Lab Units 08/03/24  0534 08/02/24 1916   SODIUM mmol/L 137 134*   POTASSIUM mmol/L 4.7 5.1   CHLORIDE mmol/L 100 94*   CO2 mmol/L 23.0 24.6   BUN mg/dL 31* 30*   CREATININE mg/dL 4.26* 4.36*   CALCIUM  "mg/dL 6.8* 7.6*   GLUCOSE mg/dL 119* 176*   ALBUMIN g/dL 2.3* 2.9*   BILIRUBIN mg/dL 0.2 0.4   ALK PHOS U/L 232* 323*   AST (SGOT) U/L 11 18   ALT (SGPT) U/L 8 11   Estimated Creatinine Clearance: 9.8 mL/min (A) (by C-G formula based on SCr of 4.26 mg/dL (H)).  No results found for: \"AMMONIA\"    Hemoglobin A1C   Date/Time Value Ref Range Status   08/02/2024 1916 6.10 (H) 4.80 - 5.60 % Final     Glucose   Date/Time Value Ref Range Status   08/03/2024 1033 108 70 - 130 mg/dL Final   08/03/2024 0650 116 70 - 130 mg/dL Final     Lab Results   Component Value Date    HGBA1C 6.10 (H) 08/02/2024     Lab Results   Component Value Date    TSH 17.140 (H) 07/21/2024    FREET4 2.1 (H) 05/17/2024       No results found for: \"BLOODCX\"  No results found for: \"URINECX\"  No results found for: \"WOUNDCX\"  No results found for: \"STOOLCX\"  No results found for: \"RESPCX\"  Pain Management Panel  More data exists         Latest Ref Rng & Units 7/21/2024 4/28/2024   Pain Management Panel   Amphetamine, Urine Qual Negative Negative  Negative    Barbiturates Screen, Urine Negative Negative  Negative    Benzodiazepine Screen, Urine Negative Negative  Negative    Buprenorphine, Screen, Urine Negative Negative  Negative    Cocaine Screen, Urine Negative Negative  Negative    Fentanyl, Urine Negative Negative  Negative    Methadone Screen , Urine Negative Negative  Negative    Methamphetamine, Ur Negative Negative  Negative       Details                 I have personally reviewed the above laboratory results.   ---------------------------------------------------------------------------------------------------------------------  Imaging Results (Last 7 Days)       Procedure Component Value Units Date/Time    CT Lower Extremity Right Without Contrast [[id number]] Resulted: 08/03/24 0936     Updated: 08/03/24 0946    CT Abdomen Pelvis Without Contrast [[id number]] Collected: 08/03/24 0010     Updated: 08/03/24 0021    Narrative:      PROCEDURE: CT of " the abdomen and pelvis performed without IV contrast on  August 2, 2024. The examination was performed with 5 mm axial imaging  and 5 mm sagittal and coronal reconstruction images. Total . The  examination was performed according to as low as reasonably achievable  dose protocol.     HISTORY: Vomiting. Peritoneal dialysis.     COMPARISON: None.     FINDINGS:     Moderate degenerative disc disease at the L4-5 and L5-S1 levels  No acute or chronic compression fracture deformity or scoliosis  Mild osteoarthritis at the right and left hip joint.  Normal heart size with no pericardial effusion.  Small bands of scar versus atelectasis in the right middle lobe and  lower lingula.  Small volume of free fluid in the right upper quadrant anterior and  lateral to the right hepatic lobe.  Small volume of free fluid in the lower posterior pelvis.  Mild distended gallbladder likely due to fasting.  Mild chronic bilateral renal cortical volume loss  Fluid-filled bladder with small volume of air in the bladder lumen.  Multiple lower pelvic phleboliths.  No bowel or renal obstruction.  Significant and diffuse aortic and aortic branch segment calcified  plaque in the abdomen and pelvis.  No abdominal aortic aneurysm or retroperitoneal hemorrhage.  Small focus of air identified in the right hemidiaphragm and lower  anterior right chest fat of nonspecific origin. This is seen on image  50, series 4.  Mild stranding in the presacral space possibly due to mild distal  colitis.       Impression:         1.  Peritoneal dialysis catheter noted in the anterior right abdominal  wall with the catheter noted to terminate in the left lateral pelvis.  2.  Small volume of free fluid in the right upper quadrant and lower  posterior pelvis and right lower quadrant.  3.  Slightly elevated left hemidiaphragm.  4.  No bowel or renal obstruction.  5.  No abdominal aortic aneurysm.  6.  No features of acute appendicitis.  7.  Stranding identified  in the presacral space could present changes  related to mild distal colitis.  8.  Fluid-filled bladder with small volume of air in the bladder lumen.  9.  No pneumatosis.  10.  No conclusive features of free air.  11.  Distended configuration to the gallbladder likely due to fasting.  No conclusive features of gastritis or enteritis.        This report was finalized on 8/3/2024 12:19 AM by Carlos Andrews MD.       XR Foot 2 View Right [[id number]] Collected: 08/02/24 1953     Updated: 08/02/24 2011    Narrative:      INDICATION: Foot pain.     TECHNIQUE: 2 views of the right foot.     COMPARISON: No relevant priors.       Impression:      FINDINGS/IMPRESSION:    Partial amputation of the fifth metatarsal. Bony demineralization. No  acute fracture or dislocation. No lytic or blastic bony lesions seen.  Diffuse interphalangeal joint space loss. Mild degenerative changes in  the midfoot. No cortical erosion. Fixation timur in the  tibia/talus/calcaneus noted.  Diffuse soft tissue swelling. Vascular calcifications. No soft tissue  gas.        This report was finalized on 8/2/2024 7:54 PM by Alex Pallas, DO.             I have personally reviewed the above radiology results.   ---------------------------------------------------------------------------------------------------------------------      Pertinent Infectious Disease Results    Thank you Dr. Crawford for allowing us to participate in the care of your patient.  As you well know, Ms. Reny Elena is a 64 y.o. female with past medical history significant for seizure disorder, type 2 diabetes, arthritis, CHF, hypothyroidism, hypertension, hyperlipidemia, GERD, PAD, ESRD, iron deficiency anemia,, who presented to [facility] Emergency Department on 8/2/2024 for nausea, vomiting, diarrhea and worsening pain of right diabetic foot wound.  Patient was recently seen in our ED on 7/21/2024 with complaints of hallucinations, fever, and right foot pain.  At this  time, patient was diagnosed with right heel ulcer and UTI.  She was sent home with an antibiotic but was unsure of the name.  Patient reported she started having nausea and vomiting after taking the antibiotics so she stopped taking them.  However, she continued to have nausea and vomiting.      Patient was tachycardic in the ED.Labs show K+ at upper limit of normal, BUN 30, cr 4.36, glucose 176, alk phos 323 and albumin 2.9, otherwise normal LFTs. CRP mildly elevated at 2.34, while lactate and WBC are normal. UA shows large leuk esterase and unable to determine RBC, WBC, bacteria of squamous cells due to loaded field.  Patient's urine culture from 7/21/2024 grew E. coli.  She had also been following with wound care who it obtained a wound culture on the same day.  This culture finalized with mixed gram-negative maurisio and light growth of Enterococcus faecalis.      X-ray of the right foot performed on 8/2/2024 revealed partial amputation of the fifth metatarsal. Bony demineralization. No acute fracture or dislocation. No lytic or blastic bony lesions seen. Diffuse interphalangeal joint space loss. Mild degenerative changes in the midfoot. No cortical erosion. Fixation timur in the tibia/talus/calcaneus noted. Diffuse soft tissue swelling. Vascular calcifications. No soft tissue gas.  CT abdomen/pelvis revealed peritoneal dialysis catheter noted in the anterior right abdominal wall with the catheter noted to terminate in the left lateral pelvis. Small volume of free fluid in the right upper quadrant and lower posterior pelvis and right lower quadrant. Slightly elevated left hemidiaphragm. No bowel or renal obstruction. No abdominal aortic aneurysm. No features of acute appendicitis. Stranding identified in the presacral space could present changes related to mild distal colitis. Fluid-filled bladder with small volume of air in the bladder lumen. No pneumatosis. No conclusive features of free air. Distended  configuration to the gallbladder likely due to fasting. No conclusive features of gastritis or enteritis.      Patient continued to have nausea and vomiting after 2 doses of Zofran.  She was admitted for further treatment.  Subsequently, patient was started on IV Rocephin in the setting of E. coli UTI.  Flagyl was added to cover possible colitis.  Patient was also started on IV vancomycin for Enterococcus faecalis wound infection.  General surgery consulted for possible debridement of patient's wound.      Assessment & Plan      Assessment      R foot wound infection, Enterococcus faecalis  Possible osteomyelitis of R foot  Hardware present in RLE  E. Coli UTI  Possible colitis           Plan      Thank you Dr. Crawford for allowing us to participate in the care of your patient.  As you well know, Ms. Reny Elena is a 64 y.o. female with past medical history significant for seizure disorder, type 2 diabetes, arthritis, CHF, hypothyroidism, hypertension, hyperlipidemia, GERD, PAD, ESRD, iron deficiency anemia,, who presented to [facility] Emergency Department on 8/2/2024 for nausea, vomiting, diarrhea and worsening pain of right diabetic foot wound.  Patient was recently seen in our ED on 7/21/2024 with complaints of hallucinations, fever, and right foot pain.  At this time, patient was diagnosed with right heel ulcer and UTI.  She was sent home with an antibiotic but was unsure of the name.  Patient reported she started having nausea and vomiting after taking the antibiotics so she stopped taking them.  However, she continued to have nausea and vomiting.      Patient was tachycardic in the ED.Labs show K+ at upper limit of normal, BUN 30, cr 4.36, glucose 176, alk phos 323 and albumin 2.9, otherwise normal LFTs. CRP mildly elevated at 2.34, while lactate and WBC are normal. UA shows large leuk esterase and unable to determine RBC, WBC, bacteria of squamous cells due to loaded field.  Patient's urine  culture from 7/21/2024 grew E. coli.  She had also been following with wound care who it obtained a wound culture on the same day.  This culture finalized with mixed gram-negative maurisio and light growth of Enterococcus faecalis.      X-ray of the right foot performed on 8/2/2024 revealed partial amputation of the fifth metatarsal. Bony demineralization. No acute fracture or dislocation. No lytic or blastic bony lesions seen. Diffuse interphalangeal joint space loss. Mild degenerative changes in the midfoot. No cortical erosion. Fixation timur in the tibia/talus/calcaneus noted. Diffuse soft tissue swelling. Vascular calcifications. No soft tissue gas.  CT abdomen/pelvis revealed peritoneal dialysis catheter noted in the anterior right abdominal wall with the catheter noted to terminate in the left lateral pelvis. Small volume of free fluid in the right upper quadrant and lower posterior pelvis and right lower quadrant. Slightly elevated left hemidiaphragm. No bowel or renal obstruction. No abdominal aortic aneurysm. No features of acute appendicitis. Stranding identified in the presacral space could present changes related to mild distal colitis. Fluid-filled bladder with small volume of air in the bladder lumen. No pneumatosis. No conclusive features of free air. Distended configuration to the gallbladder likely due to fasting. No conclusive features of gastritis or enteritis.      Patient continued to have nausea and vomiting after 2 doses of Zofran.  She was admitted for further treatment.  Subsequently, patient was started on IV Rocephin in the setting of E. coli UTI.  Flagyl was added to cover possible colitis.  Patient was also started on IV vancomycin for Enterococcus faecalis wound infection.  General surgery consulted for possible debridement of patient's wound.    Patient is laying in bed resting comfortably this morning.  She is complaining of nausea and vomiting this morning.  She is on room air without  apparent distress.  She is afebrile.  She denies diarrhea, dysuria, increased frequency and hesitation with urination.  Lungs are clear to auscultation bilaterally.  Abdomen soft and nondistended to palpation.  Active bowel sounds in all quads.  CRP slightly elevated at 1.83.  White blood cell count is stable and within normal range at 9.04.  New urinalysis is currently pending.  Lactate was normal at 1.1.  Blood cultures are currently pending.  Right foot wound examined.       Due to patient's complaints of worsening right foot pain and extent of patient's wound, we have ordered a CT of the right lower extremity along with a new wound culture. Patient would benefit from wound debridement. Intraoperative cultures would be appreciated. We will continue with patients current antibiotic regimen of IV Vancomycin in the setting of Enterococcus wound infection, Flagyl for possible colitis and IV Rocephin in the setting of E. Coli UTI. Patient will likely need long term IV antibiotics due to the extent of her wound. We will continue to monitor patient closely and adjust antibiotic coverage as appropriate.         ANTIMICROBIAL THERAPY    cefdinir - 300 MG  cefTRIAXone (ROCEPHIN) 1000 mg IVPB in 100 mL NS (VTB)  metroNIDAZOLE - 500-0.79 MG/100ML-%  Vancomycin Pharmacy Intermittent/Pulse Dosing       Again, thank you Dr. Crawford for allowing us to participate in the care of your patient and please feel free to call for any questions you may have.        Code Status:     Code Status and Medical Interventions: CPR (Attempt to Resuscitate); Full Support   Ordered at: 08/03/24 0333     Code Status (Patient has no pulse and is not breathing):    CPR (Attempt to Resuscitate)     Medical Interventions (Patient has pulse or is breathing):    Full Support         Daria Wooten PA-C  08/03/24  10:45 EDT

## 2024-08-03 NOTE — CONSULTS
Nephrology Consultation Note    Referring Provider: Dr. Tatum  Reason for Consultation: Dialysis    Chief complaint nausea and vomiting    Subjective .     History of present illness: Patient presented to the emergency room with nausea and vomiting ongoing for a week.  She reports that she had developed problems with the right heel when she was admitted to [facility] a few weeks ago.  She was then seen in the ER at [facility] and advised oral antibiotics.  For the last 1 week she has developed vomiting and nausea with very poor oral intake.  She has continued to do her dialysis and reports that her bags are clear and she has no abdominal pain.  She has had no hematemesis or melena.  She denies any dysuria.    The patient's right heel images were reviewed.  The ulcer she reports has been getting worse.  She has some discomfort but denies severe pain.  Denies any fever.    Patient denies any shortness of breath or chest pain or headache or diplopia or palpitations or syncope    Hemodynamic data reviewed and stable      History  Past Medical History:   Diagnosis Date    Arthritis     Closed fracture of right fibula and tibia 2018    Depression     Diabetic ulcer of left foot associated with type 2 diabetes mellitus 2017    Diastolic dysfunction     Disease of thyroid gland     Elevated cholesterol     End stage renal disease     Essential hypertension     GERD (gastroesophageal reflux disease)     History of transfusion     Hyperlipidemia     Iron deficiency anemia 2018    PAD (peripheral artery disease)     Renal failure     Type 2 diabetes mellitus with hyperglycemia, with long-term current use of insulin 2018   ,   Past Surgical History:   Procedure Laterality Date    ABDOMINAL SURGERY      BACK SURGERY      Post spinal diskectomy, osteophytectomy in one lumbar interspace    CATARACT EXTRACTION      Left      SECTION      DENTAL  PROCEDURE      ENDOSCOPY      FRACTURE SURGERY      right leg    HYSTERECTOMY      INCISION AND DRAINAGE LEG Left 4/15/2017    Procedure: INCISION AND DRAINAGE LOWER EXTREMITY;  Surgeon: Basilio Morris MD;  Location: [facility] OR;  Service:     INCISION AND DRAINAGE LEG Left 5/26/2017    Procedure: Irrigation and Debridment abcess diabetic wound left foot with deep culture;  Surgeon: Basilio Morris MD;  Location: [facility] OR;  Service:     INSERTION PERITONEAL DIALYSIS CATHETER N/A 12/16/2021    Procedure: INSERTION PERITONEAL DIALYSIS CATHETER LAPAROSCOPIC;  Surgeon: Edy Glover MD;  Location: [facility] OR;  Service: General;  Laterality: N/A;    KNEE ARTHROSCOPY Right     ORIF TIBIA FRACTURE Right 12/28/2018    Procedure: TIBIAL  FRACTURE OPEN REDUCTION INTERNAL FIXATION;  Surgeon: Jung Barragan MD;  Location: [facility] OR;  Service: Orthopedics    TOE AMPUTATION Right     5th    TUBAL ABDOMINAL LIGATION     ,   Family History   Problem Relation Age of Onset    Heart disease Mother     Cancer Mother     COPD Mother     Diabetes Other     Depression Other    ,   Social History     Tobacco Use    Smoking status: Never     Passive exposure: Never    Smokeless tobacco: Never   Vaping Use    Vaping status: Never Used   Substance Use Topics    Alcohol use: No    Drug use: No    and Allergies:  Morphine, Penicillins, Codeine, and Sulfa antibiotics      Vital Signs   Temp:  [97.5 °F (36.4 °C)-98.6 °F (37 °C)] 98.6 °F (37 °C)  Heart Rate:  [] 110  Resp:  [14-20] 20  BP: (108-154)/(55-89) 144/76    Physical Exam:     General Appearance:    Alert, cooperative, cachectic   Head:    Normocephalic, without obvious abnormality   Eyes:            Conjunctivae and sclerae normal, no icterus, no pallor   Ears:    Ears appear intact    Throat:   oral mucosa moist   Neck:   No JVD   Back:   Sacral decubitus noted on images   Lungs:     Clear to auscultation,respirations regular    Heart:    Regular rhythm and normal rate, normal  S1 and S2       Abdomen:     Normal bowel sounds, no  tenderness   Rectal:     Deferred   Extremities: No edema.  Images of right heel ulcer reviewed               Neurologic:   Cr Ns grossly intact, moves all extremities.     Results Review:   I reviewed the patient's new clinical results.      Lab Results (last 24 hours)       Procedure Component Value Units Date/Time    POC Glucose Once [[id number]]  (Normal) Collected: 08/03/24 1033    Specimen: Blood Updated: 08/03/24 1044     Glucose 108 mg/dL     C-reactive Protein [[id number]]  (Abnormal) Collected: 08/02/24 1916    Specimen: Blood from Arm, Right Updated: 08/03/24 0833     C-Reactive Protein 2.25 mg/dL     POC Glucose Once [[id number]]  (Normal) Collected: 08/03/24 0650    Specimen: Blood Updated: 08/03/24 0658     Glucose 116 mg/dL     Comprehensive Metabolic Panel [[id number]]  (Abnormal) Collected: 08/03/24 0534    Specimen: Blood Updated: 08/03/24 0614     Glucose 119 mg/dL      BUN 31 mg/dL      Creatinine 4.26 mg/dL      Sodium 137 mmol/L      Potassium 4.7 mmol/L      Comment: Slight hemolysis detected by analyzer. Result may be falsely elevated.        Chloride 100 mmol/L      CO2 23.0 mmol/L      Calcium 6.8 mg/dL      Total Protein 5.6 g/dL      Albumin 2.3 g/dL      ALT (SGPT) 8 U/L      AST (SGOT) 11 U/L      Alkaline Phosphatase 232 U/L      Total Bilirubin 0.2 mg/dL      Globulin 3.3 gm/dL      A/G Ratio 0.7 g/dL      BUN/Creatinine Ratio 7.3     Anion Gap 14.0 mmol/L      eGFR 10.9 mL/min/1.73      Comment: <15 Indicative of kidney failure       Narrative:      GFR Normal >60  Chronic Kidney Disease <60  Kidney Failure <15      C-reactive Protein [[id number]]  (Abnormal) Collected: 08/03/24 0534    Specimen: Blood Updated: 08/03/24 0614     C-Reactive Protein 1.83 mg/dL     CBC Auto Differential [[id number]]  (Abnormal) Collected: 08/03/24 0534    Specimen: Blood Updated: 08/03/24 0551     WBC 9.04 10*3/mm3      RBC 3.49 10*6/mm3      Hemoglobin  10.7 g/dL      Hematocrit 34.4 %      MCV 98.6 fL      MCH 30.7 pg      MCHC 31.1 g/dL      RDW 13.6 %      RDW-SD 49.2 fl      MPV 9.2 fL      Platelets 185 10*3/mm3      Neutrophil % 82.5 %      Lymphocyte % 11.8 %      Monocyte % 3.7 %      Eosinophil % 1.2 %      Basophil % 0.1 %      Immature Grans % 0.7 %      Neutrophils, Absolute 7.46 10*3/mm3      Lymphocytes, Absolute 1.07 10*3/mm3      Monocytes, Absolute 0.33 10*3/mm3      Eosinophils, Absolute 0.11 10*3/mm3      Basophils, Absolute 0.01 10*3/mm3      Immature Grans, Absolute 0.06 10*3/mm3      nRBC 0.0 /100 WBC     Hemoglobin A1c [[id number]]  (Abnormal) Collected: 08/02/24 1916    Specimen: Blood from Arm, Right Updated: 08/03/24 0405     Hemoglobin A1C 6.10 %     Narrative:      Hemoglobin A1C Ranges:    Increased Risk for Diabetes  5.7% to 6.4%  Diabetes                     >= 6.5%  Diabetic Goal                < 7.0%    Urine Culture - Urine, Urine, Clean Catch [[id number]] Collected: 08/03/24 0302    Specimen: Urine, Clean Catch Updated: 08/03/24 0326    Blood Culture - Blood, Arm, Right [[id number]] Collected: 08/02/24 2257    Specimen: Blood from Arm, Right Updated: 08/02/24 2300    Blood Culture - Blood, Arm, Right [[id number]] Collected: 08/02/24 2257    Specimen: Blood from Arm, Right Updated: 08/02/24 2300    [place] Urine Culture Tube - Urine, Clean Catch [[id number]] Collected: 08/02/24 2052    Specimen: Urine, Clean Catch Updated: 08/02/24 2107     Extra Tube Hold for add-ons.     Comment: Auto resulted.       Urinalysis, Microscopic Only - Urine, Clean Catch [[id number]]  (Abnormal) Collected: 08/02/24 2052    Specimen: Urine, Clean Catch Updated: 08/02/24 2107     RBC, UA       Unable to determine due to loaded field     /HPF     WBC, UA       Unable to determine due to loaded field     /HPF     Bacteria, UA       Unable to determine due to loaded field     /HPF     Squamous Epithelial Cells, UA       Unable to determine due to loaded field      /HPF     Hyaline Casts, UA       Unable to determine due to loaded field     /LPF     Methodology Manual Light Microscopy    Urinalysis With Microscopic If Indicated (No Culture) - Urine, Clean Catch [[id number]]  (Abnormal) Collected: 08/02/24 2052    Specimen: Urine, Clean Catch Updated: 08/02/24 2102     Color, UA Yellow     Appearance, UA Turbid     pH, UA 7.5     Specific Gravity, UA 1.015     Glucose,  mg/dL (1+)     Ketones, UA Negative     Bilirubin, UA Negative     Blood, UA Moderate (2+)     Protein,  mg/dL (2+)     Leuk Esterase, UA Large (3+)     Nitrite, UA Negative     Urobilinogen, UA 0.2 E.U./dL    C-reactive Protein [[id number]]  (Abnormal) Collected: 08/02/24 1916    Specimen: Blood from Arm, Right Updated: 08/02/24 2010     C-Reactive Protein 2.34 mg/dL     Comprehensive Metabolic Panel [[id number]]  (Abnormal) Collected: 08/02/24 1916    Specimen: Blood from Arm, Right Updated: 08/02/24 2003     Glucose 176 mg/dL      BUN 30 mg/dL      Creatinine 4.36 mg/dL      Sodium 134 mmol/L      Potassium 5.1 mmol/L      Comment: Slight hemolysis detected by analyzer. Result may be falsely elevated.        Chloride 94 mmol/L      CO2 24.6 mmol/L      Calcium 7.6 mg/dL      Total Protein 7.4 g/dL      Albumin 2.9 g/dL      ALT (SGPT) 11 U/L      AST (SGOT) 18 U/L      Alkaline Phosphatase 323 U/L      Total Bilirubin 0.4 mg/dL      Globulin 4.5 gm/dL      A/G Ratio 0.6 g/dL      BUN/Creatinine Ratio 6.9     Anion Gap 15.4 mmol/L      eGFR 10.6 mL/min/1.73      Comment: <15 Indicative of kidney failure       Narrative:      GFR Normal >60  Chronic Kidney Disease <60  Kidney Failure <15      Lipase [[id number]]  (Normal) Collected: 08/02/24 1916    Specimen: Blood from Arm, Right Updated: 08/02/24 1941     Lipase 25 U/L     Lactic Acid, Plasma [[id number]]  (Normal) Collected: 08/02/24 1916    Specimen: Blood from Arm, Right Updated: 08/02/24 1940     Lactate 1.1 mmol/L     [place] Draw [[id number]]  Collected: 08/02/24 1916    Specimen: Blood from Arm, Right Updated: 08/02/24 1931    Narrative:      The following orders were created for panel order [place] Draw.  Procedure                               Abnormality         Status                     ---------                               -----------         ------                     Green Top (Gel)[[id number]]                                  Final result               Lavender Top[[id number]]                                     Final result               Gold Top - SST[[id number]]                                   Final result               Light Blue Top[[id number]]                                   Final result                 Please view results for these tests on the individual orders.    Green Top (Gel) [[id number]] Collected: 08/02/24 1916    Specimen: Blood from Arm, Right Updated: 08/02/24 1931     Extra Tube Hold for add-ons.     Comment: Auto resulted.       Lavender Top [[id number]] Collected: 08/02/24 1916    Specimen: Blood from Arm, Right Updated: 08/02/24 1931     Extra Tube hold for add-on     Comment: Auto resulted       Gold Top - SST [[id number]] Collected: 08/02/24 1916    Specimen: Blood from Arm, Right Updated: 08/02/24 1931     Extra Tube Hold for add-ons.     Comment: Auto resulted.       Light Blue Top [[id number]] Collected: 08/02/24 1916    Specimen: Blood from Arm, Right Updated: 08/02/24 1931     Extra Tube Hold for add-ons.     Comment: Auto resulted       CBC & Differential [[id number]]  (Abnormal) Collected: 08/02/24 1916    Specimen: Blood from Arm, Right Updated: 08/02/24 1927    Narrative:      The following orders were created for panel order CBC & Differential.  Procedure                               Abnormality         Status                     ---------                               -----------         ------                     CBC Auto Differential[[id number]]        Abnormal            Final result                 Please view  results for these tests on the individual orders.    CBC Auto Differential [[id number]]  (Abnormal) Collected: 08/02/24 1916    Specimen: Blood from Arm, Right Updated: 08/02/24 1927     WBC 9.44 10*3/mm3      RBC 4.21 10*6/mm3      Hemoglobin 13.0 g/dL      Hematocrit 40.5 %      MCV 96.2 fL      MCH 30.9 pg      MCHC 32.1 g/dL      RDW 13.6 %      RDW-SD 48.0 fl      MPV 9.2 fL      Platelets 251 10*3/mm3      Neutrophil % 77.3 %      Lymphocyte % 18.3 %      Monocyte % 3.3 %      Eosinophil % 0.2 %      Basophil % 0.1 %      Immature Grans % 0.8 %      Neutrophils, Absolute 7.29 10*3/mm3      Lymphocytes, Absolute 1.73 10*3/mm3      Monocytes, Absolute 0.31 10*3/mm3      Eosinophils, Absolute 0.02 10*3/mm3      Basophils, Absolute 0.01 10*3/mm3      Immature Grans, Absolute 0.08 10*3/mm3      nRBC 0.0 /100 WBC             Imaging Results (Last 24 Hours)       Procedure Component Value Units Date/Time    CT Lower Extremity Right Without Contrast [[id number]] Collected: 08/03/24 1116     Updated: 08/03/24 1123    Narrative:      VERIFICATION OBSERVER NAME: Roberta Valerio MD.     Technique: Axial images were obtained along with coronal and sagittal  reconstruction. DLP in mGycm reported in the EMR records. Dose lowering  technique: Automated exposure control with adjustment of the MA and/or  KV, use of iterative reconstruction. Data included in the medical  records.     HISTORY/COMPARISON/FINDINGS:     Comparison: None.     HISTORY: Diabetic ulcer RIGHT heel     Findings:      Hardware extending from the tibia into the talus and calcaneus.  No bony erosion.  Defect noted in the calcaneus from previous hardware removal.  No air bubbles noted in the soft tissues.  No discrete fluid collection.       Impression:      No definite evidence of abscess formation.  Bony defect noted in the  calcaneus likely related to previous hardware removal.              [id number]  Zip code [id number]                 This report was finalized on  8/3/2024 11:21 AM by Dr. Roberta Valerio MD.       CT Abdomen Pelvis Without Contrast [[id number]] Collected: 08/03/24 0010     Updated: 08/03/24 0021    Narrative:      PROCEDURE: CT of the abdomen and pelvis performed without IV contrast on  August 2, 2024. The examination was performed with 5 mm axial imaging  and 5 mm sagittal and coronal reconstruction images. Total . The  examination was performed according to as low as reasonably achievable  dose protocol.     HISTORY: Vomiting. Peritoneal dialysis.     COMPARISON: None.     FINDINGS:     Moderate degenerative disc disease at the L4-5 and L5-S1 levels  No acute or chronic compression fracture deformity or scoliosis  Mild osteoarthritis at the right and left hip joint.  Normal heart size with no pericardial effusion.  Small bands of scar versus atelectasis in the right middle lobe and  lower lingula.  Small volume of free fluid in the right upper quadrant anterior and  lateral to the right hepatic lobe.  Small volume of free fluid in the lower posterior pelvis.  Mild distended gallbladder likely due to fasting.  Mild chronic bilateral renal cortical volume loss  Fluid-filled bladder with small volume of air in the bladder lumen.  Multiple lower pelvic phleboliths.  No bowel or renal obstruction.  Significant and diffuse aortic and aortic branch segment calcified  plaque in the abdomen and pelvis.  No abdominal aortic aneurysm or retroperitoneal hemorrhage.  Small focus of air identified in the right hemidiaphragm and lower  anterior right chest fat of nonspecific origin. This is seen on image  50, series 4.  Mild stranding in the presacral space possibly due to mild distal  colitis.       Impression:         1.  Peritoneal dialysis catheter noted in the anterior right abdominal  wall with the catheter noted to terminate in the left lateral pelvis.  2.  Small volume of free fluid in the right upper quadrant and lower  posterior pelvis and right lower  quadrant.  3.  Slightly elevated left hemidiaphragm.  4.  No bowel or renal obstruction.  5.  No abdominal aortic aneurysm.  6.  No features of acute appendicitis.  7.  Stranding identified in the presacral space could present changes  related to mild distal colitis.  8.  Fluid-filled bladder with small volume of air in the bladder lumen.  9.  No pneumatosis.  10.  No conclusive features of free air.  11.  Distended configuration to the gallbladder likely due to fasting.  No conclusive features of gastritis or enteritis.        This report was finalized on 8/3/2024 12:19 AM by Carlos Andrews MD.       XR Foot 2 View Right [[id number]] Collected: 08/02/24 1953     Updated: 08/02/24 2011    Narrative:      INDICATION: Foot pain.     TECHNIQUE: 2 views of the right foot.     COMPARISON: No relevant priors.       Impression:      FINDINGS/IMPRESSION:    Partial amputation of the fifth metatarsal. Bony demineralization. No  acute fracture or dislocation. No lytic or blastic bony lesions seen.  Diffuse interphalangeal joint space loss. Mild degenerative changes in  the midfoot. No cortical erosion. Fixation timur in the  tibia/talus/calcaneus noted.  Diffuse soft tissue swelling. Vascular calcifications. No soft tissue  gas.        This report was finalized on 8/2/2024 7:54 PM by Alex Pallas, DO.                         Assessment and Plan:    1.  End-stage renal disease on peritoneal dialysis  2.  Type 2 diabetes with peripheral vascular disease with the right heel ulcer  3.  Vomiting and dehydration  4.  Cachexia and malnutrition  5.  Sacral decubitus  6.  Asymptomatic pyuria    Noted ID consult.  Noted admit note and reviewed labs  Will go ahead and start the patient on hydration for the next 20 hours or so after a saline bolus.  She is not hypotensive but with poor oral intake and persistent vomiting for 6 to 7 days I suspect she does have dehydration.  Will also go ahead and do dialysis tonight.  Will not do any  ultrafiltration.  Will use only 1.5% bags.  Plan discussed with patient as well as with dialysis nurse          Han Sandy MD  08/03/24  12:16 EDT

## 2024-08-03 NOTE — H&P
Hospitalist History and Physical        Patient Identification  Name: Reny Elena  Age/Sex: 64 y.o. female  :  1960        MRN: [id number]  Visit Number: [id number]  Admit Date: 2024   PCP: Susan Stephens APRN          Chief complaint nausea, vomiting    History of Present Illness:  Patient is a 64 y.o. female with history of arthritis, insulin dependent type II DM, chronic diastolic CHF, hypothyroidism, seizure disorder, HTN, HLD, GERD, iron deficiency anemia, PAD, and ESRD on PD, who presents with complaints of nausea and vomiting over the last week. Of note, on 24 she presented to our ED complaining of hallucinations as well as right foot pain and fever. She was noted to have a right heel ulcer and a UTI based on abnormal UA. At that time she admits she was having some dysuria as well. She was sent home with a prescription for an antibiotic (unclear what exactly) and referred to wound care with whom she followed up today. She reports after taking the unknown antibiotic for a few days, she developed nausea and vomiting which has persisted despite stopping the antibiotic when symptoms first started. She denies abdominal pain. She denies any further dysuria. She does report increased pain in the right heel, however. In the ED, patient was tachycardic but other vitals were stable. Labs show K+ at upper limit of normal, BUN 30, cr 4.36, glucose 176, alk phos 323 and albumin 2.9, otherwise normal LFTs. CRP mildly elevated at 2.34, while lactate and WBC are normal. UA shows large leuk esterase and unable to determine RBC, WBC, bacteria of squamous cells due to loaded field. Order for urine to be sent for culture is pending, but urine culture from 24 grew E coli. Wound culture from that same visit grew moderate mixed gram negative maurisio along with light growth enterococcus faecalis. XR right foot shows diffuse soft tissue swelling. CT abd/pelvis showes possible mild distal colitis. Patient  Responded well to zofran earlier during her ED stay, but now is vomiting again. She is receiving a 2nd dose of zofran from the ED provider now and being admitted for further management.     Review of Systems  Review of Systems   Constitutional:  Positive for appetite change. Negative for chills and fever.   HENT:  Negative for postnasal drip, rhinorrhea, sinus pressure and sinus pain.    Respiratory:  Negative for cough, shortness of breath and wheezing.    Cardiovascular:  Negative for chest pain, palpitations and leg swelling.   Gastrointestinal:  Positive for nausea and vomiting. Negative for abdominal distention, abdominal pain, constipation and diarrhea.   Endocrine: Negative for polyphagia and polyuria.   Genitourinary:  Positive for dysuria. Negative for difficulty urinating, flank pain, frequency and hematuria.   Musculoskeletal:         Right heel pain   Skin:  Positive for wound (ulceration right heel).   Neurological:  Positive for weakness (generalized) and numbness. Negative for dizziness, tremors, seizures, syncope, light-headedness and headaches.   Hematological:  Negative for adenopathy. Does not bruise/bleed easily.   Psychiatric/Behavioral:  Negative for agitation, behavioral problems and confusion.        History  Past Medical History:   Diagnosis Date    Arthritis     Closed fracture of right fibula and tibia 12/25/2018    Depression     Diabetic ulcer of left foot associated with type 2 diabetes mellitus 6/23/2017    Diastolic dysfunction     Disease of thyroid gland     Elevated cholesterol     End stage renal disease     Essential hypertension     GERD (gastroesophageal reflux disease)     History of transfusion     Hyperlipidemia     Iron deficiency anemia 12/30/2018    PAD (peripheral artery disease)     Renal failure     Type 2 diabetes mellitus with hyperglycemia, with long-term current use of insulin 12/30/2018     Past Surgical History:   Procedure Laterality Date    ABDOMINAL SURGERY       BACK SURGERY      Post spinal diskectomy, osteophytectomy in one lumbar interspace    CATARACT EXTRACTION      Left      SECTION      DENTAL PROCEDURE      ENDOSCOPY      FRACTURE SURGERY      right leg    HYSTERECTOMY      INCISION AND DRAINAGE LEG Left 4/15/2017    Procedure: INCISION AND DRAINAGE LOWER EXTREMITY;  Surgeon: Basilio Morris MD;  Location: [facility] OR;  Service:     INCISION AND DRAINAGE LEG Left 2017    Procedure: Irrigation and Debridment abcess diabetic wound left foot with deep culture;  Surgeon: Basilio Morris MD;  Location: [facility] OR;  Service:     INSERTION PERITONEAL DIALYSIS CATHETER N/A 2021    Procedure: INSERTION PERITONEAL DIALYSIS CATHETER LAPAROSCOPIC;  Surgeon: Edy Glover MD;  Location: [facility] OR;  Service: General;  Laterality: N/A;    KNEE ARTHROSCOPY Right     ORIF TIBIA FRACTURE Right 2018    Procedure: TIBIAL  FRACTURE OPEN REDUCTION INTERNAL FIXATION;  Surgeon: Jung Barragan MD;  Location: [facility] OR;  Service: Orthopedics    TOE AMPUTATION Right     5th    TUBAL ABDOMINAL LIGATION       Family History   Problem Relation Age of Onset    Heart disease Mother     Cancer Mother     COPD Mother     Diabetes Other     Depression Other      Social History     Tobacco Use    Smoking status: Never     Passive exposure: Never    Smokeless tobacco: Never   Vaping Use    Vaping status: Never Used   Substance Use Topics    Alcohol use: No    Drug use: No     (Not in a hospital admission)    Allergies:  Penicillins, Codeine, and Sulfa antibiotics    Objective     Vital Signs  Temp:  [97.5 °F (36.4 °C)] 97.5 °F (36.4 °C)  Heart Rate:  [] 92  Resp:  [14] 14  BP: (108-154)/(55-89) 135/78  Body mass index is 16.64 kg/m².    Physical Exam:  Physical Exam  Constitutional:       Comments: Frail appearing 65 yo female, looks older than stated age, acutely and chronically ill. No acute distress.    HENT:      Head: Normocephalic and atraumatic.      Right Ear:  External ear normal.      Left Ear: External ear normal.      Nose: Nose normal.      Mouth/Throat:      Mouth: Mucous membranes are dry.      Pharynx: Oropharynx is clear.   Eyes:      Extraocular Movements: Extraocular movements intact.      Conjunctiva/sclera: Conjunctivae normal.      Pupils: Pupils are equal, round, and reactive to light.   Cardiovascular:      Rate and Rhythm: Regular rhythm. Tachycardia present.      Pulses: Normal pulses.      Heart sounds: Normal heart sounds. No murmur heard.  Pulmonary:      Effort: Pulmonary effort is normal. No respiratory distress.      Breath sounds: Normal breath sounds. No wheezing or rales.   Abdominal:      General: Abdomen is flat. Bowel sounds are normal. There is no distension.      Palpations: Abdomen is soft.      Tenderness: There is no abdominal tenderness.      Comments: PD cathter noted in upper abdomen; insertion site appears dry, clean, intact. Diasylate fluid does not appear cloudy or bloody.    Musculoskeletal:         General: Normal range of motion.      Cervical back: Normal range of motion and neck supple. No tenderness.      Right lower leg: No edema.      Left lower leg: No edema.      Comments: Faint right hand edema compared to left.    Lymphadenopathy:      Cervical: No cervical adenopathy.   Skin:     General: Skin is warm and dry.      Findings: Lesion (right heel, with some surrounding eschar as well as slough. Erythema of surrounding skin noted. Very foul smelling.) present.   Neurological:      General: No focal deficit present.      Mental Status: She is oriented to person, place, and time.   Psychiatric:         Mood and Affect: Mood normal.         Behavior: Behavior normal.           Results Review:       Lab Results:  Results from last 7 days   Lab Units 08/02/24 1916   WBC 10*3/mm3 9.44   HEMOGLOBIN g/dL 13.0   PLATELETS 10*3/mm3 251     Results from last 7 days   Lab Units 08/02/24  1916   CRP mg/dL 2.34*     Results from last 7  "days   Lab Units 08/02/24 1916   SODIUM mmol/L 134*   POTASSIUM mmol/L 5.1   CHLORIDE mmol/L 94*   CO2 mmol/L 24.6   BUN mg/dL 30*   CREATININE mg/dL 4.36*   CALCIUM mg/dL 7.6*   GLUCOSE mg/dL 176*         No results found for: \"HGBA1C\"  Results from last 7 days   Lab Units 08/02/24 1916   BILIRUBIN mg/dL 0.4   ALK PHOS U/L 323*   AST (SGOT) U/L 18   ALT (SGPT) U/L 11                       I have reviewed the patient's laboratory results.    Imaging:  Imaging Results (Last 72 Hours)       Procedure Component Value Units Date/Time    CT Abdomen Pelvis Without Contrast [[id number]] Collected: 08/03/24 0010     Updated: 08/03/24 0021    Narrative:      PROCEDURE: CT of the abdomen and pelvis performed without IV contrast on  August 2, 2024. The examination was performed with 5 mm axial imaging  and 5 mm sagittal and coronal reconstruction images. Total . The  examination was performed according to as low as reasonably achievable  dose protocol.     HISTORY: Vomiting. Peritoneal dialysis.     COMPARISON: None.     FINDINGS:     Moderate degenerative disc disease at the L4-5 and L5-S1 levels  No acute or chronic compression fracture deformity or scoliosis  Mild osteoarthritis at the right and left hip joint.  Normal heart size with no pericardial effusion.  Small bands of scar versus atelectasis in the right middle lobe and  lower lingula.  Small volume of free fluid in the right upper quadrant anterior and  lateral to the right hepatic lobe.  Small volume of free fluid in the lower posterior pelvis.  Mild distended gallbladder likely due to fasting.  Mild chronic bilateral renal cortical volume loss  Fluid-filled bladder with small volume of air in the bladder lumen.  Multiple lower pelvic phleboliths.  No bowel or renal obstruction.  Significant and diffuse aortic and aortic branch segment calcified  plaque in the abdomen and pelvis.  No abdominal aortic aneurysm or retroperitoneal hemorrhage.  Small focus of " air identified in the right hemidiaphragm and lower  anterior right chest fat of nonspecific origin. This is seen on image  50, series 4.  Mild stranding in the presacral space possibly due to mild distal  colitis.       Impression:         1.  Peritoneal dialysis catheter noted in the anterior right abdominal  wall with the catheter noted to terminate in the left lateral pelvis.  2.  Small volume of free fluid in the right upper quadrant and lower  posterior pelvis and right lower quadrant.  3.  Slightly elevated left hemidiaphragm.  4.  No bowel or renal obstruction.  5.  No abdominal aortic aneurysm.  6.  No features of acute appendicitis.  7.  Stranding identified in the presacral space could present changes  related to mild distal colitis.  8.  Fluid-filled bladder with small volume of air in the bladder lumen.  9.  No pneumatosis.  10.  No conclusive features of free air.  11.  Distended configuration to the gallbladder likely due to fasting.  No conclusive features of gastritis or enteritis.        This report was finalized on 8/3/2024 12:19 AM by Carlos Andrews MD.       XR Foot 2 View Right [[id number]] Collected: 08/02/24 1953     Updated: 08/02/24 2011    Narrative:      INDICATION: Foot pain.     TECHNIQUE: 2 views of the right foot.     COMPARISON: No relevant priors.       Impression:      FINDINGS/IMPRESSION:    Partial amputation of the fifth metatarsal. Bony demineralization. No  acute fracture or dislocation. No lytic or blastic bony lesions seen.  Diffuse interphalangeal joint space loss. Mild degenerative changes in  the midfoot. No cortical erosion. Fixation itmur in the  tibia/talus/calcaneus noted.  Diffuse soft tissue swelling. Vascular calcifications. No soft tissue  gas.        This report was finalized on 8/2/2024 7:54 PM by Alex Pallas, DO.               I have personally reviewed the patient's radiologic imaging.        EKG: ordered, results pending        Assessment & Plan     - Nausea,  vomiting, suspect secondary to partially treated UTI, possible side effects of antibiotic recently started, possible colitis though no abdominal pain or tenderness, and infected diabetic right heel ulcer. Treat heel ulcer with IV vancomycin based on wound culture from 7/21 that grew enteroccus faecalis. Treat UTI with IV rocephin based on recent urine culture that grew E coli. Flagyl on board to cover possible colitis. Continue to trend WBC, CRP. Follow up repeat urine and blood cultures. Consult general surgery to evaluate right heel ulcer for debridement. Keep NPO for now since still nauseated and vomiting anyhow and because surgery may wish to proceed with debridement in the morning.   - ESRD on PD: consult nephrology to set up PD while in-house.  - Type II DM, insulin dependent: check A1c. Monitor accuchecks. Cover with SSI.     DVT Prophylaxis: SQ heparin    Estimated Length of Stay >2 midnights    I discussed the patient's findings, assessment and plan with the patient and ED provider Dr Mckeon.    Erwin Tatum MD  08/03/24  03:16 EDT

## 2024-08-03 NOTE — PROGRESS NOTES
Pharmacokinetics Service Note:    Reny Elena is a 64 y.o. female being evaluated by Pharmacy for vancomycin dosing.    Indication for Therapy: SSTI of Right heel   Currently Estimated Renal Function: ClCr 9 mL/min using serum Cr of 4.36 mg/dL  Proposed Empiric Regimen Using Predicted Pharmacokinetic Parameters and Demographics: 1000 mg x 1 then intermittent dosing thereafter  Duration of Therapy Ordered: 5 days    Monitoring Planned: Will get a random level tomorrow.    Manuel Hammer PharmD  08/03/24  03:27 EDT

## 2024-08-03 NOTE — PLAN OF CARE
Goal Outcome Evaluation:   Patient had non eventful day. Complaints of nausea this shift. Wound care done per orders. VSS at this time. Plan of care ongoing.

## 2024-08-03 NOTE — PROGRESS NOTES
[facility] HOSPITALIST PROGRESS NOTE    Subjective     History:   Reny Elena is a 64 y.o. female admitted on 8/2/2024 secondary to Diabetic ulcer of right heel     Procedures: None    CC: Follow up right heel ulcer    Patient seen and examined. Awake and alert. Reports nausea, generalized abdominal pain and decreased appetite with decreased PO intake. No reported CP. No acute events overnight per RN.     History taken from: patient, chart, and RN.      Objective     Vital Signs  Temp:  [97.5 °F (36.4 °C)-98.6 °F (37 °C)] 98.6 °F (37 °C)  Heart Rate:  [] 110  Resp:  [14-20] 20  BP: (108-154)/(55-89) 144/76  No intake or output data in the 24 hours ending 08/03/24 1415      Physical Exam:  General:    Awake, alert, in no acute distress, thin, chronically ill appearing   Heart:      Normal S1 and S2. Regular rate and rhythm. No significant murmur, rubs or gallops appreciated.   Lungs:     Respirations regular, even and unlabored. Lungs clear to auscultation B/L. No wheezes, rales or rhonchi.   Abdomen:   Soft. Generalized TTP. No guarding, rebound tenderness or  organomegaly noted. Bowel sounds present x 4.   Extremities:  No clubbing, cyanosis or edema noted. (+) right heel ulcer.      Results Review:    Results from last 7 days   Lab Units 08/03/24  0534 08/02/24 1916   WBC 10*3/mm3 9.04 9.44   HEMOGLOBIN g/dL 10.7* 13.0   PLATELETS 10*3/mm3 185 251     Results from last 7 days   Lab Units 08/03/24  0534 08/02/24 1916   SODIUM mmol/L 137 134*   POTASSIUM mmol/L 4.7 5.1   CHLORIDE mmol/L 100 94*   CO2 mmol/L 23.0 24.6   BUN mg/dL 31* 30*   CREATININE mg/dL 4.26* 4.36*   CALCIUM mg/dL 6.8* 7.6*   GLUCOSE mg/dL 119* 176*     Results from last 7 days   Lab Units 08/03/24  0534 08/02/24 1916   BILIRUBIN mg/dL 0.2 0.4   ALK PHOS U/L 232* 323*   AST (SGOT) U/L 11 18   ALT (SGPT) U/L 8 11                   Imaging Results (Last 24 Hours)       Procedure Component Value Units Date/Time    CT  Lower Extremity Right Without Contrast [[id number]] Collected: 08/03/24 1116     Updated: 08/03/24 1123    Narrative:      VERIFICATION OBSERVER NAME: Roberta Valerio MD.     Technique: Axial images were obtained along with coronal and sagittal  reconstruction. DLP in mGycm reported in the EMR records. Dose lowering  technique: Automated exposure control with adjustment of the MA and/or  KV, use of iterative reconstruction. Data included in the medical  records.     HISTORY/COMPARISON/FINDINGS:     Comparison: None.     HISTORY: Diabetic ulcer RIGHT heel     Findings:      Hardware extending from the tibia into the talus and calcaneus.  No bony erosion.  Defect noted in the calcaneus from previous hardware removal.  No air bubbles noted in the soft tissues.  No discrete fluid collection.       Impression:      No definite evidence of abscess formation.  Bony defect noted in the  calcaneus likely related to previous hardware removal.              [id number]  Zip code [id number]                 This report was finalized on 8/3/2024 11:21 AM by Dr. Roberta Valerio MD.       CT Abdomen Pelvis Without Contrast [[id number]] Collected: 08/03/24 0010     Updated: 08/03/24 0021    Narrative:      PROCEDURE: CT of the abdomen and pelvis performed without IV contrast on  August 2, 2024. The examination was performed with 5 mm axial imaging  and 5 mm sagittal and coronal reconstruction images. Total . The  examination was performed according to as low as reasonably achievable  dose protocol.     HISTORY: Vomiting. Peritoneal dialysis.     COMPARISON: None.     FINDINGS:     Moderate degenerative disc disease at the L4-5 and L5-S1 levels  No acute or chronic compression fracture deformity or scoliosis  Mild osteoarthritis at the right and left hip joint.  Normal heart size with no pericardial effusion.  Small bands of scar versus atelectasis in the right middle lobe and  lower lingula.  Small volume of free fluid in the right upper  quadrant anterior and  lateral to the right hepatic lobe.  Small volume of free fluid in the lower posterior pelvis.  Mild distended gallbladder likely due to fasting.  Mild chronic bilateral renal cortical volume loss  Fluid-filled bladder with small volume of air in the bladder lumen.  Multiple lower pelvic phleboliths.  No bowel or renal obstruction.  Significant and diffuse aortic and aortic branch segment calcified  plaque in the abdomen and pelvis.  No abdominal aortic aneurysm or retroperitoneal hemorrhage.  Small focus of air identified in the right hemidiaphragm and lower  anterior right chest fat of nonspecific origin. This is seen on image  50, series 4.  Mild stranding in the presacral space possibly due to mild distal  colitis.       Impression:         1.  Peritoneal dialysis catheter noted in the anterior right abdominal  wall with the catheter noted to terminate in the left lateral pelvis.  2.  Small volume of free fluid in the right upper quadrant and lower  posterior pelvis and right lower quadrant.  3.  Slightly elevated left hemidiaphragm.  4.  No bowel or renal obstruction.  5.  No abdominal aortic aneurysm.  6.  No features of acute appendicitis.  7.  Stranding identified in the presacral space could present changes  related to mild distal colitis.  8.  Fluid-filled bladder with small volume of air in the bladder lumen.  9.  No pneumatosis.  10.  No conclusive features of free air.  11.  Distended configuration to the gallbladder likely due to fasting.  No conclusive features of gastritis or enteritis.        This report was finalized on 8/3/2024 12:19 AM by Carlos Andrews MD.       XR Foot 2 View Right [[id number]] Collected: 08/02/24 1953     Updated: 08/02/24 2011    Narrative:      INDICATION: Foot pain.     TECHNIQUE: 2 views of the right foot.     COMPARISON: No relevant priors.       Impression:      FINDINGS/IMPRESSION:    Partial amputation of the fifth metatarsal. Bony  demineralization. No  acute fracture or dislocation. No lytic or blastic bony lesions seen.  Diffuse interphalangeal joint space loss. Mild degenerative changes in  the midfoot. No cortical erosion. Fixation timur in the  tibia/talus/calcaneus noted.  Diffuse soft tissue swelling. Vascular calcifications. No soft tissue  gas.        This report was finalized on 8/2/2024 7:54 PM by Alex Pallas, DO.                 Medications:  cefTRIAXone, 1,000 mg, Intravenous, Q24H  heparin (porcine), 5,000 Units, Subcutaneous, Q12H  insulin regular, 2-7 Units, Subcutaneous, Q6H  metroNIDAZOLE, 500 mg, Intravenous, Q8H  sodium chloride, 500 mL, Intravenous, Once  sodium chloride, 10 mL, Intravenous, Q12H  [START ON 8/4/2024] Vancomycin Pharmacy Intermittent/Pulse Dosing, , Does not apply, Daily      sodium chloride, 125 mL/hr   Followed by  sodium chloride, 75 mL/hr            Assessment & Plan   Right heel ulcer: Concern for exposed bone with probable osteomyelitis. No definitive abscess on CT. Wound culture from 7/26 revealing Enterococcus faecalis and mixed gram negative maurisio with repeat culture pending. Currently on broad spectrum IV antibiotics including Vanc, Rocephin and Flagyl. Surgery consulted and in in setting of pt's nonambulatory status and hardware in RLE, is planning to proceed with right AKA. ID and surgery input appreciated.     UTI: Recent culture from 7/21 revealed E coli with repeat culture pending. Cont Rocephin.     Possible colitis: Pt reports recent N/V, generalized abd pain and decreased PO intake. CT abd/pelvis reveals findings possibly representing mild distal colitis. Cont Rocephin and Flagyl. Supportive treatment.     ESRD on PD: Concern for dehydration in the setting of above. IVF's initiated per nephrology. Plan for PD without UF per nephrology. Nephrology input appreciated.     DM II, insulin dependent: Stable. Cont SSI with Accuchecks.     Severe malnutrition: Supportive treatment.     DVT PPX: SQ  heparin    Discussed with Dr. Santoro.     Disposition Pending clinical course with tentative surgical intervention early next week.     Mark Crawford,   08/03/24  14:15 EDT

## 2024-08-03 NOTE — PLAN OF CARE
Goal Outcome Evaluation:         Patient admitted this shift from the ED. Patient has no complaints at this time. VSS stable. No s/s of acute distress noted at this time. Plan of care ongoing.

## 2024-08-03 NOTE — ED PROVIDER NOTES
Subjective   History of Present Illness  Patient is a 64-year-old female with a past medical history of arthritis, depression, diabetes, hyperlipidemia, hypertension, renal disease.  Patient reports that she has been having nausea and vomiting for approximately 1 week and diarrhea that began today.  Patient denies any abdominal pain.  Patient is a peritoneal dialysis patient and performed the peritoneal dialysis in her home each night.  Patient reports she is also having right foot pain related to a right diabetic wound to her foot.  Patient is seeing wound care for this.  Patient reports that she saw wound care today.  Patient denies any known fever and is afebrile here.  Patient is alert and oriented able to answer questions appropriately.  Patient presents private vehicle.        Review of Systems   Constitutional:  Negative for fever.   HENT: Negative.     Respiratory: Negative.     Cardiovascular: Negative.  Negative for chest pain.   Gastrointestinal:  Positive for diarrhea, nausea and vomiting. Negative for abdominal pain.   Endocrine: Negative.    Genitourinary: Negative.  Negative for dysuria.   Skin: Negative.    Neurological:  Positive for weakness.   Psychiatric/Behavioral: Negative.     All other systems reviewed and are negative.      Past Medical History:   Diagnosis Date    Arthritis     Closed fracture of right fibula and tibia 12/25/2018    Depression     Diabetic ulcer of left foot associated with type 2 diabetes mellitus 6/23/2017    Diastolic dysfunction     Disease of thyroid gland     Elevated cholesterol     End stage renal disease     Essential hypertension     GERD (gastroesophageal reflux disease)     History of transfusion     Hyperlipidemia     Iron deficiency anemia 12/30/2018    PAD (peripheral artery disease)     Renal failure     Type 2 diabetes mellitus with hyperglycemia, with long-term current use of insulin 12/30/2018       Allergies   Allergen Reactions    Penicillins Hives and  GI Intolerance     Patient has received cefazolin, ceftriaxone and cefepime during past admissions.    Codeine Hives, Rash and GI Intolerance     Pt tolerates [place] @ home    Sulfa Antibiotics Hives, Rash and GI Intolerance     NAUSEA TOO       Past Surgical History:   Procedure Laterality Date    ABDOMINAL SURGERY      BACK SURGERY      Post spinal diskectomy, osteophytectomy in one lumbar interspace    CATARACT EXTRACTION      Left      SECTION      DENTAL PROCEDURE      ENDOSCOPY      FRACTURE SURGERY      right leg    HYSTERECTOMY      INCISION AND DRAINAGE LEG Left 4/15/2017    Procedure: INCISION AND DRAINAGE LOWER EXTREMITY;  Surgeon: Basilio Morris MD;  Location: [facility] OR;  Service:     INCISION AND DRAINAGE LEG Left 2017    Procedure: Irrigation and Debridment abcess diabetic wound left foot with deep culture;  Surgeon: Basilio Morris MD;  Location: [facility] OR;  Service:     INSERTION PERITONEAL DIALYSIS CATHETER N/A 2021    Procedure: INSERTION PERITONEAL DIALYSIS CATHETER LAPAROSCOPIC;  Surgeon: Edy Glover MD;  Location: [facility] OR;  Service: General;  Laterality: N/A;    KNEE ARTHROSCOPY Right     ORIF TIBIA FRACTURE Right 2018    Procedure: TIBIAL  FRACTURE OPEN REDUCTION INTERNAL FIXATION;  Surgeon: Jung Barragan MD;  Location: [facility] OR;  Service: Orthopedics    TOE AMPUTATION Right     5th    TUBAL ABDOMINAL LIGATION         Family History   Problem Relation Age of Onset    Heart disease Mother     Cancer Mother     COPD Mother     Diabetes Other     Depression Other        Social History     Socioeconomic History    Marital status:    Tobacco Use    Smoking status: Never     Passive exposure: Never    Smokeless tobacco: Never   Vaping Use    Vaping status: Never Used   Substance and Sexual Activity    Alcohol use: No    Drug use: No    Sexual activity: Defer     Partners: Male           Objective   Physical Exam  Vitals and nursing note reviewed.    Constitutional:       General: She is not in acute distress.     Appearance: She is well-developed. She is not diaphoretic.   HENT:      Head: Normocephalic and atraumatic.      Right Ear: External ear normal.      Left Ear: External ear normal.      Nose: Nose normal.   Eyes:      Conjunctiva/sclera: Conjunctivae normal.      Pupils: Pupils are equal, round, and reactive to light.   Neck:      Vascular: No JVD.      Trachea: No tracheal deviation.   Cardiovascular:      Rate and Rhythm: Normal rate and regular rhythm.      Heart sounds: Normal heart sounds. No murmur heard.  Pulmonary:      Effort: Pulmonary effort is normal. No respiratory distress.      Breath sounds: Normal breath sounds. No wheezing.   Abdominal:      General: Bowel sounds are normal.      Palpations: Abdomen is soft.      Tenderness: There is no abdominal tenderness.   Musculoskeletal:         General: No deformity. Normal range of motion.      Cervical back: Normal range of motion and neck supple.   Skin:     General: Skin is warm and dry.      Coloration: Skin is pale.      Findings: No erythema or rash.      Comments: Diabetic wound to right heel.   Neurological:      Mental Status: She is alert and oriented to person, place, and time.      Cranial Nerves: No cranial nerve deficit.   Psychiatric:         Behavior: Behavior normal.         Thought Content: Thought content normal.         Procedures       Results for orders placed or performed during the hospital encounter of 08/02/24   Comprehensive Metabolic Panel    Specimen: Arm, Right; Blood   Result Value Ref Range    Glucose 176 (H) 65 - 99 mg/dL    BUN 30 (H) 8 - 23 mg/dL    Creatinine 4.36 (H) 0.57 - 1.00 mg/dL    Sodium 134 (L) 136 - 145 mmol/L    Potassium 5.1 3.5 - 5.2 mmol/L    Chloride 94 (L) 98 - 107 mmol/L    CO2 24.6 22.0 - 29.0 mmol/L    Calcium 7.6 (L) 8.6 - 10.5 mg/dL    Total Protein 7.4 6.0 - 8.5 g/dL    Albumin 2.9 (L) 3.5 - 5.2 g/dL    ALT (SGPT) 11 1 - 33 U/L    AST  (SGOT) 18 1 - 32 U/L    Alkaline Phosphatase 323 (H) 39 - 117 U/L    Total Bilirubin 0.4 0.0 - 1.2 mg/dL    Globulin 4.5 gm/dL    A/G Ratio 0.6 g/dL    BUN/Creatinine Ratio 6.9 (L) 7.0 - 25.0    Anion Gap 15.4 (H) 5.0 - 15.0 mmol/L    eGFR 10.6 (L) >60.0 mL/min/1.73   Lipase    Specimen: Arm, Right; Blood   Result Value Ref Range    Lipase 25 13 - 60 U/L   Urinalysis With Microscopic If Indicated (No Culture) - Urine, Clean Catch    Specimen: Urine, Clean Catch   Result Value Ref Range    Color, UA Yellow Yellow, Straw    Appearance, UA Turbid (A) Clear    pH, UA 7.5 5.0 - 8.0    Specific Gravity, UA 1.015 1.005 - 1.030    Glucose,  mg/dL (1+) (A) Negative    Ketones, UA Negative Negative    Bilirubin, UA Negative Negative    Blood, UA Moderate (2+) (A) Negative    Protein,  mg/dL (2+) (A) Negative    Leuk Esterase, UA Large (3+) (A) Negative    Nitrite, UA Negative Negative    Urobilinogen, UA 0.2 E.U./dL 0.2 - 1.0 E.U./dL   Lactic Acid, Plasma    Specimen: Arm, Right; Blood   Result Value Ref Range    Lactate 1.1 0.5 - 2.0 mmol/L   CBC Auto Differential    Specimen: Arm, Right; Blood   Result Value Ref Range    WBC 9.44 3.40 - 10.80 10*3/mm3    RBC 4.21 3.77 - 5.28 10*6/mm3    Hemoglobin 13.0 12.0 - 15.9 g/dL    Hematocrit 40.5 34.0 - 46.6 %    MCV 96.2 79.0 - 97.0 fL    MCH 30.9 26.6 - 33.0 pg    MCHC 32.1 31.5 - 35.7 g/dL    RDW 13.6 12.3 - 15.4 %    RDW-SD 48.0 37.0 - 54.0 fl    MPV 9.2 6.0 - 12.0 fL    Platelets 251 140 - 450 10*3/mm3    Neutrophil % 77.3 (H) 42.7 - 76.0 %    Lymphocyte % 18.3 (L) 19.6 - 45.3 %    Monocyte % 3.3 (L) 5.0 - 12.0 %    Eosinophil % 0.2 (L) 0.3 - 6.2 %    Basophil % 0.1 0.0 - 1.5 %    Immature Grans % 0.8 (H) 0.0 - 0.5 %    Neutrophils, Absolute 7.29 (H) 1.70 - 7.00 10*3/mm3    Lymphocytes, Absolute 1.73 0.70 - 3.10 10*3/mm3    Monocytes, Absolute 0.31 0.10 - 0.90 10*3/mm3    Eosinophils, Absolute 0.02 0.00 - 0.40 10*3/mm3    Basophils, Absolute 0.01 0.00 - 0.20  10*3/mm3    Immature Grans, Absolute 0.08 (H) 0.00 - 0.05 10*3/mm3    nRBC 0.0 0.0 - 0.2 /100 WBC   C-reactive Protein    Specimen: Arm, Right; Blood   Result Value Ref Range    C-Reactive Protein 2.34 (H) 0.00 - 0.50 mg/dL   Urinalysis, Microscopic Only - Urine, Clean Catch    Specimen: Urine, Clean Catch   Result Value Ref Range    RBC, UA Unable to determine due to loaded field (A) None Seen, 0-2 /HPF    WBC, UA Unable to determine due to loaded field (A) None Seen, 0-2 /HPF    Bacteria, UA Unable to determine due to loaded field (A) None Seen /HPF    Squamous Epithelial Cells, UA Unable to determine due to loaded field (A) None Seen, 0-2 /HPF    Hyaline Casts, UA Unable to determine due to loaded field None Seen /LPF    Methodology Manual Light Microscopy    [place] Urine Culture Tube - Urine, Clean Catch    Specimen: Urine, Clean Catch   Result Value Ref Range    Extra Tube Hold for add-ons.    Green Top (Gel)   Result Value Ref Range    Extra Tube Hold for add-ons.    Lavender Top   Result Value Ref Range    Extra Tube hold for add-on    Gold Top - SST   Result Value Ref Range    Extra Tube Hold for add-ons.    Light Blue Top   Result Value Ref Range    Extra Tube Hold for add-ons.          ED Course  ED Course as of 08/03/24 0308   Fri Aug 02, 2024   2134 Care taken over from midlevel provider at shift change.  Patient has chronic kidney disease CKD 4.  It appears that patient possibly has a urinary tract infection and is a peritoneal dialysis patient CT of the abdomen has been ordered with empiric IV Rocephin to be given.  Electronically signed by Yvette Mckeon DO, 08/02/24, 10:06 PM EDT.     [LK]   Sat Aug 03, 2024   0058 Patient was empirically given IV Rocephin in the ER.  She is a peritoneal dialysis patient and GFR is at her baseline.  Patient will be dosed renally with oral antibiotics empirically to cover possible UTI.    Patient [LK]   0235 After discussion with patient she states that she has become  progressively weak, decreased intake with vomiting. 1 Week ago she was seen and was informed that she has a UTI and she does make urine.    She was also noted to have a right pressure ulcer on her heel culture data appearing to grow out Enterococcus. [LK]   0253 Pt was accepted for admission,  [LK]      ED Course User Index  [LK] Yvette Mckeon DO                                             Medical Decision Making  Patient is a 64-year-old female with a past medical history of arthritis, depression, diabetes, hyperlipidemia, hypertension, renal disease.  Patient reports that she has been having nausea and vomiting for approximately 1 week and diarrhea that began today.  Patient denies any abdominal pain.  Patient is a peritoneal dialysis patient and performed the peritoneal dialysis in her home each night.  Patient reports she is also having right foot pain related to a right diabetic wound to her foot.  Patient is seeing wound care for this.  Patient reports that she saw wound care today.  Patient denies any known fever and is afebrile here.  Patient is alert and oriented able to answer questions appropriately.  Patient presents private vehicle.    Problems Addressed:  Acute cystitis with hematuria: complicated acute illness or injury  Nausea and vomiting, unspecified vomiting type: complicated acute illness or injury  Peritoneal dialysis finding: complicated acute illness or injury    Amount and/or Complexity of Data Reviewed  Labs: ordered.  Radiology: ordered.    Risk  Prescription drug management.  Decision regarding hospitalization.        Final diagnoses:   Peritoneal dialysis finding   Nausea and vomiting, unspecified vomiting type   Acute cystitis with hematuria   Ulcer of right foot, unspecified ulcer stage       ED Disposition  ED Disposition       ED Disposition   Decision to Admit    Condition   --    Comment   --               No follow-up provider specified.       Medication List      No changes were  made to your prescriptions during this visit.            Yvette Mckeon DO  08/03/24 0305

## 2024-08-04 LAB
ANION GAP SERPL CALCULATED.3IONS-SCNC: 13.9 MMOL/L (ref 5–15)
BASOPHILS # BLD AUTO: 0.01 10*3/MM3 (ref 0–0.2)
BASOPHILS NFR BLD AUTO: 0.1 % (ref 0–1.5)
BUN SERPL-MCNC: 27 MG/DL (ref 8–23)
BUN/CREAT SERPL: 7.1 (ref 7–25)
CALCIUM SPEC-SCNC: 6.4 MG/DL (ref 8.6–10.5)
CHLORIDE SERPL-SCNC: 104 MMOL/L (ref 98–107)
CO2 SERPL-SCNC: 19.1 MMOL/L (ref 22–29)
CREAT SERPL-MCNC: 3.8 MG/DL (ref 0.57–1)
CRP SERPL-MCNC: 1.86 MG/DL (ref 0–0.5)
DEPRECATED RDW RBC AUTO: 50.2 FL (ref 37–54)
EGFRCR SERPLBLD CKD-EPI 2021: 12.5 ML/MIN/1.73
EOSINOPHIL # BLD AUTO: 0.06 10*3/MM3 (ref 0–0.4)
EOSINOPHIL NFR BLD AUTO: 0.8 % (ref 0.3–6.2)
ERYTHROCYTE [DISTWIDTH] IN BLOOD BY AUTOMATED COUNT: 13.7 % (ref 12.3–15.4)
GLUCOSE BLDC GLUCOMTR-MCNC: 169 MG/DL (ref 70–130)
GLUCOSE BLDC GLUCOMTR-MCNC: 186 MG/DL (ref 70–130)
GLUCOSE BLDC GLUCOMTR-MCNC: 188 MG/DL (ref 70–130)
GLUCOSE BLDC GLUCOMTR-MCNC: 189 MG/DL (ref 70–130)
GLUCOSE BLDC GLUCOMTR-MCNC: 198 MG/DL (ref 70–130)
GLUCOSE BLDC GLUCOMTR-MCNC: 60 MG/DL (ref 70–130)
GLUCOSE BLDC GLUCOMTR-MCNC: 79 MG/DL (ref 70–130)
GLUCOSE SERPL-MCNC: 202 MG/DL (ref 65–99)
HCT VFR BLD AUTO: 32.3 % (ref 34–46.6)
HGB BLD-MCNC: 10.1 G/DL (ref 12–15.9)
IMM GRANULOCYTES # BLD AUTO: 0.06 10*3/MM3 (ref 0–0.05)
IMM GRANULOCYTES NFR BLD AUTO: 0.8 % (ref 0–0.5)
LYMPHOCYTES # BLD AUTO: 1.13 10*3/MM3 (ref 0.7–3.1)
LYMPHOCYTES NFR BLD AUTO: 14.2 % (ref 19.6–45.3)
MCH RBC QN AUTO: 31.3 PG (ref 26.6–33)
MCHC RBC AUTO-ENTMCNC: 31.3 G/DL (ref 31.5–35.7)
MCV RBC AUTO: 100 FL (ref 79–97)
MONOCYTES # BLD AUTO: 0.32 10*3/MM3 (ref 0.1–0.9)
MONOCYTES NFR BLD AUTO: 4 % (ref 5–12)
NEUTROPHILS NFR BLD AUTO: 6.4 10*3/MM3 (ref 1.7–7)
NEUTROPHILS NFR BLD AUTO: 80.1 % (ref 42.7–76)
NRBC BLD AUTO-RTO: 0 /100 WBC (ref 0–0.2)
PLATELET # BLD AUTO: 180 10*3/MM3 (ref 140–450)
PMV BLD AUTO: 9.2 FL (ref 6–12)
POTASSIUM SERPL-SCNC: 4.2 MMOL/L (ref 3.5–5.2)
RBC # BLD AUTO: 3.23 10*6/MM3 (ref 3.77–5.28)
SODIUM SERPL-SCNC: 137 MMOL/L (ref 136–145)
VANCOMYCIN SERPL-MCNC: 13.8 MCG/ML (ref 5–40)
WBC NRBC COR # BLD AUTO: 7.98 10*3/MM3 (ref 3.4–10.8)

## 2024-08-04 PROCEDURE — 80048 BASIC METABOLIC PNL TOTAL CA: CPT | Performed by: INTERNAL MEDICINE

## 2024-08-04 PROCEDURE — 25010000002 PROCHLORPERAZINE 10 MG/2ML SOLUTION: Performed by: HOSPITALIST

## 2024-08-04 PROCEDURE — 25810000003 SODIUM CHLORIDE 0.9 % SOLUTION: Performed by: HOSPITALIST

## 2024-08-04 PROCEDURE — 25010000002 CEFTRIAXONE PER 250 MG: Performed by: HOSPITALIST

## 2024-08-04 PROCEDURE — 63710000001 INSULIN REGULAR HUMAN PER 5 UNITS: Performed by: HOSPITALIST

## 2024-08-04 PROCEDURE — 99232 SBSQ HOSP IP/OBS MODERATE 35: CPT

## 2024-08-04 PROCEDURE — 80202 ASSAY OF VANCOMYCIN: CPT | Performed by: HOSPITALIST

## 2024-08-04 PROCEDURE — 82948 REAGENT STRIP/BLOOD GLUCOSE: CPT

## 2024-08-04 PROCEDURE — 85025 COMPLETE CBC W/AUTO DIFF WBC: CPT | Performed by: INTERNAL MEDICINE

## 2024-08-04 PROCEDURE — 25810000003 SODIUM CHLORIDE 0.9 % SOLUTION: Performed by: INTERNAL MEDICINE

## 2024-08-04 PROCEDURE — 25010000002 METRONIDAZOLE 500 MG/100ML SOLUTION: Performed by: HOSPITALIST

## 2024-08-04 PROCEDURE — 25010000002 ONDANSETRON PER 1 MG: Performed by: HOSPITALIST

## 2024-08-04 PROCEDURE — 25010000002 VANCOMYCIN 5 G RECONSTITUTED SOLUTION: Performed by: HOSPITALIST

## 2024-08-04 PROCEDURE — 90792 PSYCH DIAG EVAL W/MED SRVCS: CPT | Performed by: PSYCHIATRY & NEUROLOGY

## 2024-08-04 PROCEDURE — 25010000002 HYDROMORPHONE PER 4 MG: Performed by: HOSPITALIST

## 2024-08-04 PROCEDURE — 86140 C-REACTIVE PROTEIN: CPT

## 2024-08-04 PROCEDURE — 99232 SBSQ HOSP IP/OBS MODERATE 35: CPT | Performed by: INTERNAL MEDICINE

## 2024-08-04 PROCEDURE — 25010000002 HEPARIN (PORCINE) PER 1000 UNITS: Performed by: HOSPITALIST

## 2024-08-04 RX ORDER — SODIUM CHLORIDE 9 MG/ML
50 INJECTION, SOLUTION INTRAVENOUS CONTINUOUS
Status: ACTIVE | OUTPATIENT
Start: 2024-08-04 | End: 2024-08-05

## 2024-08-04 RX ORDER — ESCITALOPRAM OXALATE 10 MG/1
5 TABLET ORAL NIGHTLY
Status: DISCONTINUED | OUTPATIENT
Start: 2024-08-04 | End: 2024-08-04

## 2024-08-04 RX ADMIN — ATORVASTATIN CALCIUM 80 MG: 40 TABLET, FILM COATED ORAL at 22:10

## 2024-08-04 RX ADMIN — ROPINIROLE HYDROCHLORIDE 0.5 MG: 0.25 TABLET, FILM COATED ORAL at 22:10

## 2024-08-04 RX ADMIN — SODIUM CHLORIDE 75 ML/HR: 9 INJECTION, SOLUTION INTRAVENOUS at 05:13

## 2024-08-04 RX ADMIN — CEFTRIAXONE 1000 MG: 1 INJECTION, POWDER, FOR SOLUTION INTRAMUSCULAR; INTRAVENOUS at 22:14

## 2024-08-04 RX ADMIN — HYDROMORPHONE HYDROCHLORIDE 0.5 MG: 1 INJECTION, SOLUTION INTRAMUSCULAR; INTRAVENOUS; SUBCUTANEOUS at 03:12

## 2024-08-04 RX ADMIN — INSULIN HUMAN 2 UNITS: 100 INJECTION, SOLUTION PARENTERAL at 23:52

## 2024-08-04 RX ADMIN — Medication 10 ML: at 22:13

## 2024-08-04 RX ADMIN — LEVOTHYROXINE SODIUM 100 MCG: 0.1 TABLET ORAL at 08:44

## 2024-08-04 RX ADMIN — ONDANSETRON 4 MG: 2 INJECTION INTRAMUSCULAR; INTRAVENOUS at 22:33

## 2024-08-04 RX ADMIN — METRONIDAZOLE 500 MG: 500 INJECTION, SOLUTION INTRAVENOUS at 11:54

## 2024-08-04 RX ADMIN — HEPARIN SODIUM 5000 UNITS: 5000 INJECTION INTRAVENOUS; SUBCUTANEOUS at 22:10

## 2024-08-04 RX ADMIN — PROCHLORPERAZINE EDISYLATE 2.5 MG: 5 INJECTION INTRAMUSCULAR; INTRAVENOUS at 03:12

## 2024-08-04 RX ADMIN — HYDROXYZINE HYDROCHLORIDE 25 MG: 25 TABLET ORAL at 08:44

## 2024-08-04 RX ADMIN — METRONIDAZOLE 500 MG: 500 INJECTION, SOLUTION INTRAVENOUS at 03:12

## 2024-08-04 RX ADMIN — HYDROCODONE BITARTRATE AND ACETAMINOPHEN 1 TABLET: 10; 325 TABLET ORAL at 22:15

## 2024-08-04 RX ADMIN — VANCOMYCIN HYDROCHLORIDE 750 MG: 5 INJECTION, POWDER, LYOPHILIZED, FOR SOLUTION INTRAVENOUS at 12:10

## 2024-08-04 RX ADMIN — LACOSAMIDE 50 MG: 50 TABLET, FILM COATED ORAL at 08:43

## 2024-08-04 RX ADMIN — TRAZODONE HYDROCHLORIDE 50 MG: 50 TABLET ORAL at 22:11

## 2024-08-04 RX ADMIN — POTASSIUM CHLORIDE 20 MEQ: 1500 TABLET, EXTENDED RELEASE ORAL at 22:16

## 2024-08-04 RX ADMIN — ASPIRIN 81 MG: 81 TABLET, COATED ORAL at 08:43

## 2024-08-04 RX ADMIN — SODIUM CHLORIDE 50 ML/HR: 9 INJECTION, SOLUTION INTRAVENOUS at 23:53

## 2024-08-04 RX ADMIN — Medication 10 ML: at 08:45

## 2024-08-04 RX ADMIN — LACOSAMIDE 50 MG: 50 TABLET, FILM COATED ORAL at 22:11

## 2024-08-04 RX ADMIN — HYDROMORPHONE HYDROCHLORIDE 0.5 MG: 1 INJECTION, SOLUTION INTRAMUSCULAR; INTRAVENOUS; SUBCUTANEOUS at 12:10

## 2024-08-04 RX ADMIN — LOSARTAN POTASSIUM 50 MG: 50 TABLET, FILM COATED ORAL at 08:43

## 2024-08-04 RX ADMIN — FOLIC ACID 1 MG: 1 TABLET ORAL at 08:43

## 2024-08-04 RX ADMIN — FLUOXETINE HYDROCHLORIDE 40 MG: 20 CAPSULE ORAL at 08:44

## 2024-08-04 RX ADMIN — FERROUS SULFATE TAB 325 MG (65 MG ELEMENTAL FE) 325 MG: 325 (65 FE) TAB at 08:43

## 2024-08-04 RX ADMIN — PROCHLORPERAZINE EDISYLATE 2.5 MG: 5 INJECTION INTRAMUSCULAR; INTRAVENOUS at 11:57

## 2024-08-04 RX ADMIN — NIFEDIPINE 90 MG: 30 TABLET, FILM COATED, EXTENDED RELEASE ORAL at 08:44

## 2024-08-04 RX ADMIN — HYDROCODONE BITARTRATE AND ACETAMINOPHEN 1 TABLET: 10; 325 TABLET ORAL at 08:44

## 2024-08-04 RX ADMIN — METRONIDAZOLE 500 MG: 500 INJECTION, SOLUTION INTRAVENOUS at 18:17

## 2024-08-04 RX ADMIN — PANTOPRAZOLE SODIUM 40 MG: 40 TABLET, DELAYED RELEASE ORAL at 08:44

## 2024-08-04 RX ADMIN — POTASSIUM CHLORIDE 20 MEQ: 1500 TABLET, EXTENDED RELEASE ORAL at 08:43

## 2024-08-04 RX ADMIN — HEPARIN SODIUM 5000 UNITS: 5000 INJECTION INTRAVENOUS; SUBCUTANEOUS at 08:44

## 2024-08-04 RX ADMIN — ROPINIROLE HYDROCHLORIDE 0.5 MG: 0.25 TABLET, FILM COATED ORAL at 08:43

## 2024-08-04 NOTE — PROGRESS NOTES
Nephrology Progress Note    Interval History:     Patient Complaints: Patient is agreeable to amputation recommended by general surgery but is a bit distraught.  Nausea was better yesterday but she says that it has become worse today after the news.    The dialysis nurse had received a call from her significant other stating that it would not be possible for them to do peritoneal dialysis at home if she had an above-knee amputation.    I discussed this with the patient and she told me that her significant other had called her this morning and said they would manage.    Otherwise we will need to transition her to hemodialysis.        Vital Signs  Temp:  [97.3 °F (36.3 °C)-99.1 °F (37.3 °C)] 98 °F (36.7 °C)  Heart Rate:  [] 98  Resp:  [18] 18  BP: ()/(51-75) 124/55    Physical Exam:    General:           No distress but depressed      HEENT: Pallor               Neck: No JVD       Lungs:   Clear   Heart:  Regular,  no rub.       Abdomen:   Normal bowel sounds, soft nontender, nondistended, no guarding.       Extremities: No edema                        Results Review:    I reviewed the patient's new clinical results.    Lab Results (last 24 hours)       Procedure Component Value Units Date/Time    POC Glucose Once [[id number]]  (Normal) Collected: 08/04/24 1620    Specimen: Blood Updated: 08/04/24 1628     Glucose 79 mg/dL     Urine Culture - Urine, Urine, Clean Catch [[id number]]  (Abnormal) Collected: 08/03/24 0302    Specimen: Urine, Clean Catch Updated: 08/04/24 1151     Urine Culture 25,000 CFU/mL Escherichia coli    Narrative:      Colonization of the urinary tract without infection is common. Treatment is discouraged unless the patient is symptomatic, pregnant, or undergoing an invasive urologic procedure.    POC Glucose Once [[id number]]  (Abnormal) Collected: 08/04/24 1133    Specimen: Blood Updated: 08/04/24 1139     Glucose 60 mg/dL      C-reactive Protein [[id number]]  (Abnormal) Collected: 08/04/24 0216    Specimen: Blood Updated: 08/04/24 0803     C-Reactive Protein 1.86 mg/dL     POC Glucose Once [[id number]]  (Abnormal) Collected: 08/04/24 0639    Specimen: Blood Updated: 08/04/24 0706     Glucose 169 mg/dL     POC Glucose Once [[id number]]  (Abnormal) Collected: 08/04/24 0540    Specimen: Blood Updated: 08/04/24 0546     Glucose 188 mg/dL     CBC & Differential [[id number]]  (Abnormal) Collected: 08/04/24 0216    Specimen: Blood Updated: 08/04/24 0307    Narrative:      The following orders were created for panel order CBC & Differential.  Procedure                               Abnormality         Status                     ---------                               -----------         ------                     CBC Auto Differential[[id number]]        Abnormal            Final result               Scan Slide[[id number]]                                                                    Please view results for these tests on the individual orders.    CBC Auto Differential [[id number]]  (Abnormal) Collected: 08/04/24 0300    Specimen: Blood Updated: 08/04/24 0307     WBC 7.98 10*3/mm3      RBC 3.23 10*6/mm3      Hemoglobin 10.1 g/dL      Hematocrit 32.3 %      .0 fL      MCH 31.3 pg      MCHC 31.3 g/dL      RDW 13.7 %      RDW-SD 50.2 fl      MPV 9.2 fL      Platelets 180 10*3/mm3      Neutrophil % 80.1 %      Lymphocyte % 14.2 %      Monocyte % 4.0 %      Eosinophil % 0.8 %      Basophil % 0.1 %      Immature Grans % 0.8 %      Neutrophils, Absolute 6.40 10*3/mm3      Lymphocytes, Absolute 1.13 10*3/mm3      Monocytes, Absolute 0.32 10*3/mm3      Eosinophils, Absolute 0.06 10*3/mm3      Basophils, Absolute 0.01 10*3/mm3      Immature Grans, Absolute 0.06 10*3/mm3      nRBC 0.0 /100 WBC     Vancomycin, Random [[id number]]  (Normal) Collected: 08/04/24 0216    Specimen: Blood Updated: 08/04/24 0305     Vancomycin Random 13.80 mcg/mL     Narrative:       Therapeutic Ranges for Vancomycin    Vancomycin Random   5.0-40.0 mcg/mL  Vancomycin Trough   5.0-20.0 mcg/mL  Vancomycin Peak     20.0-40.0 mcg/mL    Basic Metabolic Panel [[id number]]  (Abnormal) Collected: 08/04/24 0216    Specimen: Blood Updated: 08/04/24 0305     Glucose 202 mg/dL      BUN 27 mg/dL      Creatinine 3.80 mg/dL      Sodium 137 mmol/L      Potassium 4.2 mmol/L      Comment: Slight hemolysis detected by analyzer. Result may be falsely elevated.        Chloride 104 mmol/L      CO2 19.1 mmol/L      Calcium 6.4 mg/dL      BUN/Creatinine Ratio 7.1     Anion Gap 13.9 mmol/L      eGFR 12.5 mL/min/1.73      Comment: <15 Indicative of kidney failure       Narrative:      GFR Normal >60  Chronic Kidney Disease <60  Kidney Failure <15      POC Glucose Once [[id number]]  (Abnormal) Collected: 08/04/24 0026    Specimen: Blood Updated: 08/04/24 0032     Glucose 186 mg/dL     Blood Culture - Blood, Arm, Right [[id number]]  (Normal) Collected: 08/02/24 2257    Specimen: Blood from Arm, Right Updated: 08/03/24 2315     Blood Culture No growth at 24 hours    Blood Culture - Blood, Arm, Right [[id number]]  (Normal) Collected: 08/02/24 2257    Specimen: Blood from Arm, Right Updated: 08/03/24 2315     Blood Culture No growth at 24 hours    POC Glucose Once [[id number]]  (Normal) Collected: 08/03/24 1920    Specimen: Blood Updated: 08/03/24 1927     Glucose 97 mg/dL             Imaging Results (Last 24 Hours)       ** No results found for the last 24 hours. **            Assessment and Plan:    1.  End-stage renal disease on peritoneal dialysis  2.  Type 2 diabetes with peripheral vascular disease with the right heel ulcer  3.  Vomiting and dehydration  4.  Cachexia and malnutrition  5.  Sacral decubitus  6.  Asymptomatic pyuria      Will continue IV fluids for another 24 hours at 50 mL/h  Noted plans for above-knee amputation    His significant other changes his mind over the course of the next few days following  surgery, about assisting her with her peritoneal dialysis, then we will transition to hemodialysis    Will do dialysis only with 1.5% bags  Discussed with RN for dialysis    Reviewed Dr. Crawford's notes and notes by general surgery and Dr. Guaman as well as MATT Sandy MD  08/04/24  17:53 EDT

## 2024-08-04 NOTE — PROGRESS NOTES
"    [facility] Medicine Services  CROSS COVER NOTE      Contacted by the patient's nurse stating the  had just finished speaking with the patient and expressed concern due to reported thoughts of suicide without an active plan.  I went and evaluated the patient in her room and she adamantly denied any thoughts of suicide and states she \"would never do that.\"  She does admit to being stressed due to her current health conditions and continues to struggle with the passing of her cousin which was around 8 years ago but she feels like she is managing well overall. Informed the patient that should she develop any suicidal or homicidal ideations she should notify nursing staff immediately.  She gave verbal understanding.      Daisy Aguayo PA-C  Hospitalist Service -- [facility]   Pager: [phone]    08/03/24  23:09 EDT    "

## 2024-08-04 NOTE — PROGRESS NOTES
Pharmacokinetics Service Note:    Reny Elena is a 64 y.o. female being managed by Pharmacy for vancomycin dosing.    Indication for Therapy: R heel diabetic foot ulcer  Current Regimen: intermittent dosing due to PD dependent ESRD  Current Day of Therapy and Ordered Duration: day 2 of 5  Level Reported and Timing with Respect to Previous Dose: 13.8 mcg/mL collected 20.75 hrs post initial dose    Assessment/Plan: Will redose with 750 mg x 1 dose today to maintain adequate levels.     Monitoring Planned: Plan to repeat a random level either Tuesday morning or Monday afternoon to guide further dosing.     Thank you.  Amy Hairston, Pharm.D.  8/4/2024  09:20 EDT

## 2024-08-04 NOTE — CONSULTS
"                                                        Psychiatry Consult     Clinician: Hugo Guaman MD  12:47 EDT 08/04/24    Reason for Consult: depression, SI    HPI: 64 y.o. female presents with history of arthritis, insulin dependent type II DM, chronic diastolic CHF, hypothyroidism, seizure disorder, HTN, HLD, GERD, iron deficiency anemia, PAD, and ESRD on PD, who presented to the ED on 08/02/24 with complaints of nausea and vomiting over the last week. Found to have right heel ulcer with exposed bone and probably osteomyelitis. General surgery has recommended right AKA.  came to speak with patient yesterday and expressed concern for SI.     Patient resting comfortably in bed on exam today. She is calm and cooperative with assessment.     She admits to feeling down and depressed for \"a long time.\" Over the last 6 months she admits to anhedonia, hopelessness and decreased motivation most days. After learning that she needed AKA yesterday she felt very anxious and stressed. She reported SI without any active plan to  but has since denied any SI to nursing staff. She denies SI on assessment today. Denies any prior suicide attempt or other self-harm.  She is able to name reasons to continue living.    Screening for bipolar negative. No family history of psychiatric disorders.     Available medical/psychiatric records reviewed and incorporated into the current document.     Past Psychiatric History: She has never been seen by psychiatry, no prior mental health diagnoses. No prescribed medications for mental lashae.     Substance Abuse History: Patient denies any illicit substance use. No alcohol use.     Social History: Patient lives in [place] with her boyfriend of 37 years. She has two children.     Family History   Problem Relation Age of Onset    Heart disease Mother     Cancer Mother     COPD Mother     Diabetes Other     Depression Other         Allergies   Allergen Reactions    Morphine " Nausea And Vomiting    Penicillins Hives and GI Intolerance     Patient has received cefazolin, ceftriaxone and cefepime during past admissions.    Codeine Hives, Rash and GI Intolerance     Pt tolerates [place] @ home    Sulfa Antibiotics Hives, Rash and GI Intolerance     NAUSEA TOO        Past Medical History:   Diagnosis Date    Arthritis     Closed fracture of right fibula and tibia 12/25/2018    Depression     Diabetic ulcer of left foot associated with type 2 diabetes mellitus 6/23/2017    Diastolic dysfunction     Disease of thyroid gland     Elevated cholesterol     End stage renal disease     Essential hypertension     GERD (gastroesophageal reflux disease)     History of transfusion     Hyperlipidemia     Iron deficiency anemia 12/30/2018    PAD (peripheral artery disease)     Renal failure     Type 2 diabetes mellitus with hyperglycemia, with long-term current use of insulin 12/30/2018       Prior to Admission medications    Medication Sig Start Date End Date Taking? Authorizing Provider   aspirin 81 MG EC tablet Take 1 tablet by mouth Daily.   Yes Heike Potter MD   cefdinir (OMNICEF) 300 MG capsule Take 1 capsule by mouth Every Other Day.   Yes Heike Potter MD   ferrous sulfate 325 (65 FE) MG tablet Take 1 tablet by mouth Daily With Breakfast.   Yes Heike Potter MD   FLUoxetine (PROzac) 40 MG capsule Take 1 capsule by mouth Daily.   Yes Heike Potter MD   fluticasone (FLONASE) 50 MCG/ACT nasal spray 2 sprays into the nostril(s) as directed by provider Daily As Needed for Rhinitis.   Yes Heike Potter MD   folic acid (FOLVITE) 1 MG tablet Take 1 tablet by mouth Daily.   Yes Heike Potter MD   HYDROcodone-acetaminophen (NORCO)  MG per tablet Take 1 tablet by mouth 3 (Three) Times a Day As Needed for Moderate Pain.   Yes Heike Potter MD   hydrOXYzine (ATARAX) 25 MG tablet Take 1 tablet by mouth 3 (Three) Times a Day As Needed for Anxiety.  11/9/21  Yes Nelida Mcintyre MD   Insulin Aspart, w/Niacinamide, (Fiasp) 100 UNIT/ML solution Inject 2-7 Units as directed 4 (Four) Times a Day Before Meals & at Bedtime.   Yes Heike Potter MD   lacosamide (VIMPAT) 50 MG tablet tablet Take 1 tablet by mouth Every 12 (Twelve) Hours.   Yes Heike Potter MD   levothyroxine (SYNTHROID, LEVOTHROID) 100 MCG tablet Take 1 tablet by mouth Daily.   Yes Heike Potter MD   lidocaine (LIDODERM) 5 % Place 1 patch on the skin as directed by provider Daily As Needed for Mild Pain. Remove & Discard patch within 12 hours or as directed by MD   Yes Heike Potter MD   losartan (COZAAR) 50 MG tablet Take 1 tablet by mouth Daily.   Yes Heike Potter MD   NIFEdipine XL (PROCARDIA XL) 90 MG 24 hr tablet Take 1 tablet by mouth Daily.   Yes Heike Potter MD   ondansetron ODT (ZOFRAN-ODT) 4 MG disintegrating tablet Place 1 tablet on the tongue 3 (Three) Times a Day As Needed for Nausea or Vomiting.   Yes Heike Potter MD   pantoprazole (PROTONIX) 40 MG EC tablet Take 1 tablet by mouth Daily.   Yes Heike Potter MD   potassium chloride (KLOR-CON M20) 20 MEQ CR tablet Take 1 tablet by mouth 2 (Two) Times a Day.   Yes Heike Potter MD   rOPINIRole (REQUIP) 0.5 MG tablet Take 1 tablet by mouth 2 (Two) Times a Day.   Yes Heike Potter MD   tiZANidine (ZANAFLEX) 4 MG tablet Take 1 tablet by mouth Every 6 (Six) Hours As Needed for Muscle Spasms.   Yes Heike Potter MD   atorvastatin (LIPITOR) 80 MG tablet Take 1 tablet by mouth Every Night. 7/10/22   Mark Crawford DO   traZODone (DESYREL) 50 MG tablet Take 1 tablet by mouth Every Night.    Heike Potter MD        Temp:  [97.3 °F (36.3 °C)-99.1 °F (37.3 °C)] 97.6 °F (36.4 °C)  Heart Rate:  [] 105  Resp:  [18] 18  BP: ()/(51-86) 118/62    Mental Status Evaluation:  Appearance:  older than stated age and thin & gaunt looking    Behavior:  normal   Speech:  normal pitch and normal volume   Mood:  depressed   Affect:  flat and mood-congruent   Thought Process:  linear   Thought Content:  Negativistic    Sensorium:  person, place, time/date, and situation   Cognition:  grossly intact   Insight:  fair   Judgment:  fair     Interval Review of Systems:   General ROS: negative for - fever or malaise  Endocrine ROS: negative for - palpitations  Respiratory ROS: no cough, shortness of breath, or wheezing  Cardiovascular ROS: no chest pain or dyspnea on exertion  Gastrointestinal ROS: no abdominal pain,no black or bloody stools  Neuro: negative  Skin: negative      Physical Exam:  Not indicated for a psychiatric consult      Diagnosis:    Major Depressive Disorder, single episode, severe without psychotic features      Recommendations:    Recommend increasing home dose of prozac from 40 to 60mg Daily.   Patient will need outpatient follow up with psychiatry for further medication management as well as therapy. She is open to follow up outpatient and will need assistance securing psychiatric appointment upon discharge. She is not actively suicidal at this time but has been advised to notify nursing staff immediately if she does develop any SI.  Return to ER if SI develops after discharge.    Call [facility] on Monday to schedule patient an appointment.    This note was generated by a scribe, Terrence Sandoval. The work documented in this note was completed, reviewed, and approved by the attending psychiatrist as designated Dr. Hugo Guaman electronic signature.     IHugo MD, personally performed the services described in this documentation as scribed by the above named individual and is both accurate and complete as of 8/4/2024.

## 2024-08-04 NOTE — PROGRESS NOTES
Seems a bit apprehensive today regarding the possibility of amputation.  She states she has not had time to discuss with her family.  We discussed that I have time tomorrow for her operation but if she needs more time to discuss with family, it is not emergent

## 2024-08-04 NOTE — PROGRESS NOTES
PROGRESS NOTE         Patient Identification:  Name:  Reny Elena  Age:  64 y.o.  Sex:  female  :  1960  MRN:  [id number]  Visit Number:  [id number]  Primary Care Provider:  Susan Stephens APRN         LOS: 1 day       ----------------------------------------------------------------------------------------------------------------------  Subjective       Chief Complaints:    Vomiting        Interval History:      Patient is laying in bed resting comfortably this morning.  She continues to have a lot of nausea and vomiting.  She denies diarrhea.  She continues on room air without apparent distress.  She is afebrile.  No other issues overnight.  Lungs are clear to auscultation bilaterally.  Abdomen soft and nontender to palpation.  Active bowel sounds in all quads.  CRP is stable at 1.86.  White blood cell count is also stable at 7.98.  Hepatitis panel was nonreactive.  Wound cultures currently pending.  Blood cultures show no growth at 24 hours.  Urine culture also pending.  CT of the right lower extremity revealed hardware extending from the tibia into the talus and calcaneus. No bony erosion. Defect noted in the calcaneus from previous hardware removal. No air bubbles noted in the soft tissues. No discrete fluid collection. Per general surgery, there was palpable bone at the base.  They reviewed CT results and have planned for a right AKA tomorrow.      Review of Systems:    Constitutional: no fever, chills and night sweats.  Generalized fatigue.  Eyes: no eye drainage, itching or redness.  HEENT: no mouth sores, dysphagia or nose bleed.  Respiratory: no for shortness of breath, cough or production of sputum.  Cardiovascular: no chest pain, no palpitations, no orthopnea.  Gastrointestinal: C/o nausea, vomiting. No diarrhea. No abdominal pain, hematemesis or rectal bleeding.  Genitourinary: peritoneal dialysis. no dysuria or polyuria.  Hematologic/lymphatic: no lymph node abnormalities, no  easy bruising or easy bleeding.  Musculoskeletal: no muscle or joint pain.  Skin: R foot wound. No rash and no itching.  Neurological: no loss of consciousness, no seizure, no headache.  Behavioral/Psych: no depression or suicidal ideation.  Endocrine: no hot flashes.  Immunologic: negative.    ----------------------------------------------------------------------------------------------------------------------      Objective       [facility] Meds:  aspirin, 81 mg, Oral, Daily  atorvastatin, 80 mg, Oral, Nightly  cefTRIAXone, 1,000 mg, Intravenous, Q24H  ferrous sulfate, 325 mg, Oral, Daily With Breakfast  FLUoxetine, 40 mg, Oral, Daily  folic acid, 1 mg, Oral, Daily  heparin (porcine), 5,000 Units, Subcutaneous, Q12H  insulin regular, 2-7 Units, Subcutaneous, Q6H  lacosamide, 50 mg, Oral, Q12H  levothyroxine, 100 mcg, Oral, QAM AC  losartan, 50 mg, Oral, Daily  metroNIDAZOLE, 500 mg, Intravenous, Q8H  NIFEdipine XL, 90 mg, Oral, Daily  pantoprazole, 40 mg, Oral, QAM AC  potassium chloride, 20 mEq, Oral, BID  rOPINIRole, 0.5 mg, Oral, BID  sodium chloride, 10 mL, Intravenous, Q12H  traZODone, 50 mg, Oral, Nightly  vancomycin, 750 mg, Intravenous, Once  Vancomycin Pharmacy Intermittent/Pulse Dosing, , Does not apply, Daily      sodium chloride, 75 mL/hr, Last Rate: 75 mL/hr (08/04/24 0513)      ----------------------------------------------------------------------------------------------------------------------    Vital Signs:  Temp:  [97.3 °F (36.3 °C)-99.1 °F (37.3 °C)] 97.8 °F (36.6 °C)  Heart Rate:  [] 96  Resp:  [18-20] 18  BP: ()/(51-86) 130/70  Mean Arterial Pressure (Non-Invasive) for the past 24 hrs (Last 3 readings):   Noninvasive MAP (mmHg)   08/04/24 0634 103   08/04/24 0356 69   08/03/24 2356 85     SpO2 Percentage    08/03/24 2356 08/04/24 0356 08/04/24 0634   SpO2: 96% 98% 96%     SpO2:  [93 %-98 %] 96 %  on   ;   Device (Oxygen Therapy): room air    Body mass index is 17.95  kg/m².  Wt Readings from Last 3 Encounters:   08/04/24 47.4 kg (104 lb 9.6 oz)   07/26/24 45.4 kg (100 lb)   07/26/24 51.7 kg (114 lb)        Intake/Output Summary (Last 24 hours) at 8/4/2024 1000  Last data filed at 8/4/2024 0900  Gross per 24 hour   Intake 2662.83 ml   Output 633 ml   Net 2029.83 ml     Diet: Liquid; Clear Liquid; Fluid Consistency: Thin (IDDSI 0)  NPO Diet NPO Type: Sips with Meds  ----------------------------------------------------------------------------------------------------------------------      Physical Exam:    Constitutional: Frail elderly female laying in bed resting comfortably this morning.  She continues to have nausea and vomiting.  No other issues overnight.  HENT:  Head: Normocephalic and atraumatic.  Mouth:  Moist mucous membranes.    Eyes:  Conjunctivae and EOM are normal.  No scleral icterus.  Neck:  Neck supple.  No JVD present.    Cardiovascular:  Normal rate, regular rhythm and normal heart sounds with no murmur. No edema.  Pulmonary/Chest: On room air without apparent distress.  Lungs are clear to auscultation bilaterally.  No respiratory distress, no wheezes, no crackles, with normal breath sounds and good air movement.  Abdominal:  peritoneal dialysis catheter. Soft.  Bowel sounds are normal.  No distension and no tenderness.   Musculoskeletal: Right foot wound with dressing in place.  No edema, no tenderness, and no deformity.  No swelling or redness of joints.  Neurological:  Alert and oriented to person, place, and time.  No facial droop.  No slurred speech.   Skin: Right foot wound with dressing in place.  Skin is warm and dry.  No rash noted.  No pallor.   Psychiatric:  Normal mood and affect.  Behavior is normal.        ----------------------------------------------------------------------------------------------------------------------            Results from last 7 days   Lab Units 08/04/24  0300 08/04/24  0216 08/03/24  0534 08/02/24  1916   CRP mg/dL  --   "1.86* 1.83* 2.25*  2.34*   LACTATE mmol/L  --   --   --  1.1   WBC 10*3/mm3 7.98  --  9.04 9.44   HEMOGLOBIN g/dL 10.1*  --  10.7* 13.0   HEMATOCRIT % 32.3*  --  34.4 40.5   MCV fL 100.0*  --  98.6* 96.2   MCHC g/dL 31.3*  --  31.1* 32.1   PLATELETS 10*3/mm3 180  --  185 251     Results from last 7 days   Lab Units 08/04/24  0216 08/03/24  0534 08/02/24  1916   SODIUM mmol/L 137 137 134*   POTASSIUM mmol/L 4.2 4.7 5.1   CHLORIDE mmol/L 104 100 94*   CO2 mmol/L 19.1* 23.0 24.6   BUN mg/dL 27* 31* 30*   CREATININE mg/dL 3.80* 4.26* 4.36*   CALCIUM mg/dL 6.4* 6.8* 7.6*   GLUCOSE mg/dL 202* 119* 176*   ALBUMIN g/dL  --  2.3* 2.9*   BILIRUBIN mg/dL  --  0.2 0.4   ALK PHOS U/L  --  232* 323*   AST (SGOT) U/L  --  11 18   ALT (SGPT) U/L  --  8 11   Estimated Creatinine Clearance: 10.9 mL/min (A) (by C-G formula based on SCr of 3.8 mg/dL (H)).  No results found for: \"AMMONIA\"    Hemoglobin A1C   Date/Time Value Ref Range Status   08/02/2024 1916 6.10 (H) 4.80 - 5.60 % Final     Glucose   Date/Time Value Ref Range Status   08/04/2024 0639 169 (H) 70 - 130 mg/dL Final   08/04/2024 0540 188 (H) 70 - 130 mg/dL Final   08/04/2024 0026 186 (H) 70 - 130 mg/dL Final   08/03/2024 1920 97 70 - 130 mg/dL Final   08/03/2024 1628 95 70 - 130 mg/dL Final   08/03/2024 1033 108 70 - 130 mg/dL Final   08/03/2024 0650 116 70 - 130 mg/dL Final     Lab Results   Component Value Date    HGBA1C 6.10 (H) 08/02/2024     Lab Results   Component Value Date    TSH 17.140 (H) 07/21/2024    FREET4 2.1 (H) 05/17/2024       Blood Culture   Date Value Ref Range Status   08/02/2024 No growth at 24 hours  Preliminary   08/02/2024 No growth at 24 hours  Preliminary     No results found for: \"URINECX\"  No results found for: \"WOUNDCX\"  No results found for: \"STOOLCX\"  No results found for: \"RESPCX\"  Pain Management Panel  More data exists         Latest Ref Rng & Units 7/21/2024 4/28/2024   Pain Management Panel   Amphetamine, Urine Qual Negative Negative  " Negative    Barbiturates Screen, Urine Negative Negative  Negative    Benzodiazepine Screen, Urine Negative Negative  Negative    Buprenorphine, Screen, Urine Negative Negative  Negative    Cocaine Screen, Urine Negative Negative  Negative    Fentanyl, Urine Negative Negative  Negative    Methadone Screen , Urine Negative Negative  Negative    Methamphetamine, Ur Negative Negative  Negative       Details                     ----------------------------------------------------------------------------------------------------------------------  Imaging Results (Last 24 Hours)       Procedure Component Value Units Date/Time    CT Lower Extremity Right Without Contrast [[id number]] Collected: 08/03/24 1116     Updated: 08/03/24 1123    Narrative:      VERIFICATION OBSERVER NAME: Roberta Valerio MD.     Technique: Axial images were obtained along with coronal and sagittal  reconstruction. DLP in mGycm reported in the EMR records. Dose lowering  technique: Automated exposure control with adjustment of the MA and/or  KV, use of iterative reconstruction. Data included in the medical  records.     HISTORY/COMPARISON/FINDINGS:     Comparison: None.     HISTORY: Diabetic ulcer RIGHT heel     Findings:      Hardware extending from the tibia into the talus and calcaneus.  No bony erosion.  Defect noted in the calcaneus from previous hardware removal.  No air bubbles noted in the soft tissues.  No discrete fluid collection.       Impression:      No definite evidence of abscess formation.  Bony defect noted in the  calcaneus likely related to previous hardware removal.              [id number]  Zip code [id number]                 This report was finalized on 8/3/2024 11:21 AM by Dr. Roberta Valerio MD.               ----------------------------------------------------------------------------------------------------------------------    Pertinent Infectious Disease Results    Thank you Dr. Crawford for allowing us to participate in the care of  your patient.  As you well know, Ms. Reny Elena is a 64 y.o. female with past medical history significant for seizure disorder, type 2 diabetes, arthritis, CHF, hypothyroidism, hypertension, hyperlipidemia, GERD, PAD, ESRD, iron deficiency anemia,, who presented to [facility] Emergency Department on 8/2/2024 for nausea, vomiting, diarrhea and worsening pain of right diabetic foot wound.  Patient was recently seen in our ED on 7/21/2024 with complaints of hallucinations, fever, and right foot pain.  At this time, patient was diagnosed with right heel ulcer and UTI.  She was sent home with an antibiotic but was unsure of the name.  Patient reported she started having nausea and vomiting after taking the antibiotics so she stopped taking them.  However, she continued to have nausea and vomiting.       Patient was tachycardic in the ED.Labs show K+ at upper limit of normal, BUN 30, cr 4.36, glucose 176, alk phos 323 and albumin 2.9, otherwise normal LFTs. CRP mildly elevated at 2.34, while lactate and WBC are normal. UA shows large leuk esterase and unable to determine RBC, WBC, bacteria of squamous cells due to loaded field.  Patient's urine culture from 7/21/2024 grew E. coli.  She had also been following with wound care who it obtained a wound culture on the same day.  This culture finalized with mixed gram-negative maurisio and light growth of Enterococcus faecalis.       X-ray of the right foot performed on 8/2/2024 revealed partial amputation of the fifth metatarsal. Bony demineralization. No acute fracture or dislocation. No lytic or blastic bony lesions seen. Diffuse interphalangeal joint space loss. Mild degenerative changes in the midfoot. No cortical erosion. Fixation timur in the tibia/talus/calcaneus noted. Diffuse soft tissue swelling. Vascular calcifications. No soft tissue gas.  CT abdomen/pelvis revealed peritoneal dialysis catheter noted in the anterior right abdominal wall with the catheter  noted to terminate in the left lateral pelvis. Small volume of free fluid in the right upper quadrant and lower posterior pelvis and right lower quadrant. Slightly elevated left hemidiaphragm. No bowel or renal obstruction. No abdominal aortic aneurysm. No features of acute appendicitis. Stranding identified in the presacral space could present changes related to mild distal colitis. Fluid-filled bladder with small volume of air in the bladder lumen. No pneumatosis. No conclusive features of free air. Distended configuration to the gallbladder likely due to fasting. No conclusive features of gastritis or enteritis.       Patient continued to have nausea and vomiting after 2 doses of Zofran.  She was admitted for further treatment.  Subsequently, patient was started on IV Rocephin in the setting of E. coli UTI.  Flagyl was added to cover possible colitis.  Patient was also started on IV vancomycin for Enterococcus faecalis wound infection.  General surgery consulted for possible debridement of patient's wound.     Assessment/Plan       Assessment     R foot wound infection, Enterococcus faecalis  Possible osteomyelitis of R foot  Hardware present in RLE  E. Coli UTI  Possible colitis         Plan      Patient is laying in bed resting comfortably this morning.  She continues to have a lot of nausea and vomiting.  She denies diarrhea.  She continues on room air without apparent distress.  She is afebrile.  No other issues overnight.  Lungs are clear to auscultation bilaterally.  Abdomen soft and nontender to palpation.  Active bowel sounds in all quads.  CRP is stable at 1.86.  White blood cell count is also stable at 7.98.  Hepatitis panel was nonreactive.  Wound cultures currently pending.  Blood cultures show no growth at 24 hours.  Urine culture also pending.  CT of the right lower extremity revealed hardware extending from the tibia into the talus and calcaneus. No bony erosion. Defect noted in the calcaneus from  previous hardware removal. No air bubbles noted in the soft tissues. No discrete fluid collection. Per general surgery, there was palpable bone at the base.  They reviewed CT results and have planned for a right AKA tomorrow.      Patient is currently receiving vancomycin in the setting of Enterococcus faecalis right foot wound infection.  She is also receiving Flagyl for possible colitis.  Lastly, patient is receiving Rocephin in the setting of E. coli UTI.  We will continue with this antibiotic regimen until new wound cultures have finalized.  We are agreeable to patient's right AKA due to the presence of hardware and the extent of her wound.  We will continue to monitor patient closely and adjust antibiotic coverage as appropriate.      ANTIMICROBIAL THERAPY    cefdinir - 300 MG  cefTRIAXone (ROCEPHIN) 1000 mg IVPB in 100 mL NS (VTB)  metroNIDAZOLE - 500-0.79 MG/100ML-%  vancomycin  Vancomycin Pharmacy Intermittent/Pulse Dosing     Code Status:   Code Status and Medical Interventions: CPR (Attempt to Resuscitate); Full Support   Ordered at: 08/03/24 0333     Code Status (Patient has no pulse and is not breathing):    CPR (Attempt to Resuscitate)     Medical Interventions (Patient has pulse or is breathing):    Full Support       Daria Wooten PA-C  08/04/24  10:00 EDT

## 2024-08-04 NOTE — PLAN OF CARE
Goal Outcome Evaluation:    Pt resting in bed. No signs or symptoms of distress noted. No complaints at this time. Plan of care on going.

## 2024-08-04 NOTE — PLAN OF CARE
Problem: Adult Inpatient Plan of Care  Goal: Plan of Care Review  Outcome: Ongoing, Progressing   Goal Outcome Evaluation:  Plan of Care Reviewed With: patient   Patient resting in bed. Complaints of pain throughout shift that has since resolved. PRN medications provided. Patient voiced request at beginning of shift to speak with . Mariano on-call  notified.  comes to nurses station post conversation with patient voicing concern of SI. Willie LOWRY notified. Upon Willie LOWRY arriving to floor to evaluate patient, patient denies any SI. More frequent safety checks preformed throughout shift and patient verbalizes understanding to notify staff of any SI. IV fluids infusing per order. IV ABX continued. IV patent. Wound care completed. Turned q2h per schedule. PD continued throughout shift. Plan for right AKA on Monday, 8/5. Plan of care ongoing.

## 2024-08-04 NOTE — PROGRESS NOTES
[facility] HOSPITALIST PROGRESS NOTE    Subjective     History:   Reny Elena is a 64 y.o. female admitted on 8/2/2024 secondary to Diabetic ulcer of right heel     Procedures: None    CC: Follow up right heel ulcer    Patient seen and examined with GLORIA Li. Awake and alert. Reports increased anxiety today. No reported CP. No reported vomiting. Concern for possible SI overnight. Pt denies any active SI today. No acute events overnight per RN.     History taken from: patient, chart, and RN.      Objective     Vital Signs  Temp:  [97.3 °F (36.3 °C)-99.1 °F (37.3 °C)] 98 °F (36.7 °C)  Heart Rate:  [] 98  Resp:  [18] 18  BP: ()/(51-75) 124/55    Intake/Output Summary (Last 24 hours) at 8/4/2024 1758  Last data filed at 8/4/2024 1400  Gross per 24 hour   Intake 2982.83 ml   Output 483 ml   Net 2499.83 ml         Physical Exam:  General:    Awake, alert, in no acute distress, thin, chronically ill appearing   Heart:      Normal S1 and S2. Regular rate and rhythm. No significant murmur, rubs or gallops appreciated.   Lungs:     Respirations regular, even and unlabored. Lungs clear to auscultation B/L. No wheezes, rales or rhonchi.   Abdomen:   Soft. Nontender. No guarding, rebound tenderness or  organomegaly noted. Bowel sounds present x 4.   Extremities:  No clubbing, cyanosis or edema noted. Right foot currently bandaged.       Results Review:    Results from last 7 days   Lab Units 08/04/24  0300 08/03/24  0534 08/02/24 1916   WBC 10*3/mm3 7.98 9.04 9.44   HEMOGLOBIN g/dL 10.1* 10.7* 13.0   PLATELETS 10*3/mm3 180 185 251     Results from last 7 days   Lab Units 08/04/24  0216 08/03/24  0534 08/02/24 1916   SODIUM mmol/L 137 137 134*   POTASSIUM mmol/L 4.2 4.7 5.1   CHLORIDE mmol/L 104 100 94*   CO2 mmol/L 19.1* 23.0 24.6   BUN mg/dL 27* 31* 30*   CREATININE mg/dL 3.80* 4.26* 4.36*   CALCIUM mg/dL 6.4* 6.8* 7.6*   GLUCOSE mg/dL 202* 119* 176*     Results from last 7 days   Lab Units  08/03/24  0534 08/02/24  1916   BILIRUBIN mg/dL 0.2 0.4   ALK PHOS U/L 232* 323*   AST (SGOT) U/L 11 18   ALT (SGPT) U/L 8 11                   Imaging Results (Last 24 Hours)       ** No results found for the last 24 hours. **              Medications:  aspirin, 81 mg, Oral, Daily  atorvastatin, 80 mg, Oral, Nightly  cefTRIAXone, 1,000 mg, Intravenous, Q24H  ferrous sulfate, 325 mg, Oral, Daily With Breakfast  [START ON 8/5/2024] FLUoxetine, 60 mg, Oral, Daily  folic acid, 1 mg, Oral, Daily  heparin (porcine), 5,000 Units, Subcutaneous, Q12H  insulin regular, 2-7 Units, Subcutaneous, Q6H  lacosamide, 50 mg, Oral, Q12H  levothyroxine, 100 mcg, Oral, QAM AC  losartan, 50 mg, Oral, Daily  metroNIDAZOLE, 500 mg, Intravenous, Q8H  NIFEdipine XL, 90 mg, Oral, Daily  pantoprazole, 40 mg, Oral, QAM AC  potassium chloride, 20 mEq, Oral, BID  rOPINIRole, 0.5 mg, Oral, BID  sodium chloride, 10 mL, Intravenous, Q12H  traZODone, 50 mg, Oral, Nightly  Vancomycin Pharmacy Intermittent/Pulse Dosing, , Does not apply, Daily      sodium chloride, 75 mL/hr, Last Rate: 75 mL/hr (08/04/24 0513)  sodium chloride, 50 mL/hr            Assessment & Plan   Right heel ulcer: Concern for exposed bone with probable osteomyelitis. No definitive abscess on CT. Wound culture from 7/26 revealing Enterococcus faecalis and mixed gram negative maurisio with repeat culture pending. Currently on broad spectrum IV antibiotics including Vanc, Rocephin and Flagyl. Surgery consulted and in in setting of pt's nonambulatory status and hardware in RLE, is planning to proceed with right AKA. ID and surgery input appreciated.     UTI: Recent culture from 7/21 revealed E coli with repeat culture revealing low colony count of E coli. Cont Rocephin.     Possible colitis: Pt reports recent N/V, generalized abd pain and decreased PO intake. CT abd/pelvis reveals findings possibly representing mild distal colitis. Cont Rocephin and Flagyl. Supportive treatment.     ESRD  on PD: Concern for dehydration in the setting of above. IVF's initiated per nephrology. Plan for PD without UF per nephrology. Nephrology input appreciated.     DM II, insulin dependent: Episode of hypoglycemia today. Cont SSI with Accuchecks.     Severe malnutrition: Supportive treatment.     Anxiety and depression: Psychiatry consulted with recs to increase Prozac. Supportive treatment.     DVT PPX: SQ heparin    Disposition Pending clinical course with tentative surgical intervention early next week.     Mark Crawford DO  08/04/24  17:58 EDT

## 2024-08-05 LAB
ALBUMIN SERPL-MCNC: 2.2 G/DL (ref 3.5–5.2)
ALBUMIN/GLOB SERPL: 0.7 G/DL
ALP SERPL-CCNC: 205 U/L (ref 39–117)
ALT SERPL W P-5'-P-CCNC: 9 U/L (ref 1–33)
ANION GAP SERPL CALCULATED.3IONS-SCNC: 14.4 MMOL/L (ref 5–15)
AST SERPL-CCNC: 15 U/L (ref 1–32)
BACTERIA SPEC AEROBE CULT: ABNORMAL
BASOPHILS # BLD AUTO: 0.02 10*3/MM3 (ref 0–0.2)
BASOPHILS NFR BLD AUTO: 0.2 % (ref 0–1.5)
BILIRUB SERPL-MCNC: 0.2 MG/DL (ref 0–1.2)
BUN SERPL-MCNC: 21 MG/DL (ref 8–23)
BUN/CREAT SERPL: 6.6 (ref 7–25)
CALCIUM SPEC-SCNC: 6.9 MG/DL (ref 8.6–10.5)
CHLORIDE SERPL-SCNC: 103 MMOL/L (ref 98–107)
CO2 SERPL-SCNC: 20.6 MMOL/L (ref 22–29)
CREAT SERPL-MCNC: 3.19 MG/DL (ref 0.57–1)
DEPRECATED RDW RBC AUTO: 54.6 FL (ref 37–54)
EGFRCR SERPLBLD CKD-EPI 2021: 15.5 ML/MIN/1.73
EOSINOPHIL # BLD AUTO: 0.05 10*3/MM3 (ref 0–0.4)
EOSINOPHIL NFR BLD AUTO: 0.4 % (ref 0.3–6.2)
ERYTHROCYTE [DISTWIDTH] IN BLOOD BY AUTOMATED COUNT: 13.7 % (ref 12.3–15.4)
GLOBULIN UR ELPH-MCNC: 3.3 GM/DL
GLUCOSE BLDC GLUCOMTR-MCNC: 122 MG/DL (ref 70–130)
GLUCOSE BLDC GLUCOMTR-MCNC: 129 MG/DL (ref 70–130)
GLUCOSE BLDC GLUCOMTR-MCNC: 169 MG/DL (ref 70–130)
GLUCOSE BLDC GLUCOMTR-MCNC: 66 MG/DL (ref 70–130)
GLUCOSE SERPL-MCNC: 130 MG/DL (ref 65–99)
HCT VFR BLD AUTO: 35 % (ref 34–46.6)
HGB BLD-MCNC: 10.2 G/DL (ref 12–15.9)
IMM GRANULOCYTES # BLD AUTO: 0.09 10*3/MM3 (ref 0–0.05)
IMM GRANULOCYTES NFR BLD AUTO: 0.7 % (ref 0–0.5)
LYMPHOCYTES # BLD AUTO: 0.93 10*3/MM3 (ref 0.7–3.1)
LYMPHOCYTES NFR BLD AUTO: 7.6 % (ref 19.6–45.3)
MCH RBC QN AUTO: 31.6 PG (ref 26.6–33)
MCHC RBC AUTO-ENTMCNC: 29.1 G/DL (ref 31.5–35.7)
MCV RBC AUTO: 108.4 FL (ref 79–97)
MONOCYTES # BLD AUTO: 0.45 10*3/MM3 (ref 0.1–0.9)
MONOCYTES NFR BLD AUTO: 3.7 % (ref 5–12)
NEUTROPHILS NFR BLD AUTO: 10.72 10*3/MM3 (ref 1.7–7)
NEUTROPHILS NFR BLD AUTO: 87.4 % (ref 42.7–76)
NRBC BLD AUTO-RTO: 0 /100 WBC (ref 0–0.2)
PLATELET # BLD AUTO: 102 10*3/MM3 (ref 140–450)
PMV BLD AUTO: 10.1 FL (ref 6–12)
POTASSIUM SERPL-SCNC: 3.8 MMOL/L (ref 3.5–5.2)
PROT SERPL-MCNC: 5.5 G/DL (ref 6–8.5)
RBC # BLD AUTO: 3.23 10*6/MM3 (ref 3.77–5.28)
SODIUM SERPL-SCNC: 138 MMOL/L (ref 136–145)
VANCOMYCIN SERPL-MCNC: 23.3 MCG/ML (ref 5–40)
WBC NRBC COR # BLD AUTO: 12.26 10*3/MM3 (ref 3.4–10.8)

## 2024-08-05 PROCEDURE — 99232 SBSQ HOSP IP/OBS MODERATE 35: CPT | Performed by: NURSE PRACTITIONER

## 2024-08-05 PROCEDURE — 25010000002 CEFTRIAXONE PER 250 MG: Performed by: INTERNAL MEDICINE

## 2024-08-05 PROCEDURE — 80202 ASSAY OF VANCOMYCIN: CPT | Performed by: INTERNAL MEDICINE

## 2024-08-05 PROCEDURE — 25010000002 METRONIDAZOLE 500 MG/100ML SOLUTION: Performed by: INTERNAL MEDICINE

## 2024-08-05 PROCEDURE — 25010000002 PROCHLORPERAZINE 10 MG/2ML SOLUTION: Performed by: HOSPITALIST

## 2024-08-05 PROCEDURE — 82948 REAGENT STRIP/BLOOD GLUCOSE: CPT

## 2024-08-05 PROCEDURE — 99024 POSTOP FOLLOW-UP VISIT: CPT | Performed by: SURGERY

## 2024-08-05 PROCEDURE — 80053 COMPREHEN METABOLIC PANEL: CPT | Performed by: INTERNAL MEDICINE

## 2024-08-05 PROCEDURE — 25010000002 METRONIDAZOLE 500 MG/100ML SOLUTION: Performed by: HOSPITALIST

## 2024-08-05 PROCEDURE — 25010000002 HEPARIN (PORCINE) PER 1000 UNITS: Performed by: INTERNAL MEDICINE

## 2024-08-05 PROCEDURE — 85025 COMPLETE CBC W/AUTO DIFF WBC: CPT | Performed by: INTERNAL MEDICINE

## 2024-08-05 PROCEDURE — 25810000003 SODIUM CHLORIDE 0.9 % SOLUTION: Performed by: INTERNAL MEDICINE

## 2024-08-05 PROCEDURE — 25010000002 HYDROMORPHONE PER 4 MG: Performed by: HOSPITALIST

## 2024-08-05 PROCEDURE — 99232 SBSQ HOSP IP/OBS MODERATE 35: CPT | Performed by: INTERNAL MEDICINE

## 2024-08-05 RX ADMIN — Medication 10 ML: at 08:51

## 2024-08-05 RX ADMIN — ASPIRIN 81 MG: 81 TABLET, COATED ORAL at 08:50

## 2024-08-05 RX ADMIN — METRONIDAZOLE 500 MG: 500 INJECTION, SOLUTION INTRAVENOUS at 19:33

## 2024-08-05 RX ADMIN — LEVOTHYROXINE SODIUM 100 MCG: 0.1 TABLET ORAL at 08:49

## 2024-08-05 RX ADMIN — HYDROCODONE BITARTRATE AND ACETAMINOPHEN 1 TABLET: 10; 325 TABLET ORAL at 11:30

## 2024-08-05 RX ADMIN — NIFEDIPINE 90 MG: 30 TABLET, FILM COATED, EXTENDED RELEASE ORAL at 08:49

## 2024-08-05 RX ADMIN — FOLIC ACID 1 MG: 1 TABLET ORAL at 08:49

## 2024-08-05 RX ADMIN — LOSARTAN POTASSIUM 50 MG: 50 TABLET, FILM COATED ORAL at 08:50

## 2024-08-05 RX ADMIN — ROPINIROLE HYDROCHLORIDE 0.5 MG: 0.25 TABLET, FILM COATED ORAL at 20:36

## 2024-08-05 RX ADMIN — SODIUM CHLORIDE 50 ML/HR: 9 INJECTION, SOLUTION INTRAVENOUS at 17:38

## 2024-08-05 RX ADMIN — FERROUS SULFATE TAB 325 MG (65 MG ELEMENTAL FE) 325 MG: 325 (65 FE) TAB at 08:49

## 2024-08-05 RX ADMIN — HYDROMORPHONE HYDROCHLORIDE 0.5 MG: 1 INJECTION, SOLUTION INTRAMUSCULAR; INTRAVENOUS; SUBCUTANEOUS at 00:08

## 2024-08-05 RX ADMIN — FLUOXETINE HYDROCHLORIDE 60 MG: 20 CAPSULE ORAL at 08:50

## 2024-08-05 RX ADMIN — METRONIDAZOLE 500 MG: 500 INJECTION, SOLUTION INTRAVENOUS at 03:15

## 2024-08-05 RX ADMIN — HYDROMORPHONE HYDROCHLORIDE 0.5 MG: 1 INJECTION, SOLUTION INTRAMUSCULAR; INTRAVENOUS; SUBCUTANEOUS at 04:03

## 2024-08-05 RX ADMIN — ATORVASTATIN CALCIUM 80 MG: 40 TABLET, FILM COATED ORAL at 20:36

## 2024-08-05 RX ADMIN — CEFTRIAXONE 1000 MG: 1 INJECTION, POWDER, FOR SOLUTION INTRAMUSCULAR; INTRAVENOUS at 21:40

## 2024-08-05 RX ADMIN — HEPARIN SODIUM 5000 UNITS: 5000 INJECTION INTRAVENOUS; SUBCUTANEOUS at 20:36

## 2024-08-05 RX ADMIN — LACOSAMIDE 50 MG: 50 TABLET, FILM COATED ORAL at 20:36

## 2024-08-05 RX ADMIN — PROCHLORPERAZINE EDISYLATE 2.5 MG: 5 INJECTION INTRAMUSCULAR; INTRAVENOUS at 00:08

## 2024-08-05 RX ADMIN — POTASSIUM CHLORIDE 20 MEQ: 1500 TABLET, EXTENDED RELEASE ORAL at 20:36

## 2024-08-05 RX ADMIN — METRONIDAZOLE 500 MG: 500 INJECTION, SOLUTION INTRAVENOUS at 10:48

## 2024-08-05 RX ADMIN — Medication 10 ML: at 20:38

## 2024-08-05 RX ADMIN — ROPINIROLE HYDROCHLORIDE 0.5 MG: 0.25 TABLET, FILM COATED ORAL at 08:49

## 2024-08-05 RX ADMIN — LACOSAMIDE 50 MG: 50 TABLET, FILM COATED ORAL at 08:49

## 2024-08-05 RX ADMIN — DEXTROSE MONOHYDRATE 25 G: 25 INJECTION, SOLUTION INTRAVENOUS at 05:51

## 2024-08-05 RX ADMIN — TRAZODONE HYDROCHLORIDE 50 MG: 50 TABLET ORAL at 20:36

## 2024-08-05 RX ADMIN — POTASSIUM CHLORIDE 20 MEQ: 1500 TABLET, EXTENDED RELEASE ORAL at 08:49

## 2024-08-05 RX ADMIN — PANTOPRAZOLE SODIUM 40 MG: 40 TABLET, DELAYED RELEASE ORAL at 08:50

## 2024-08-05 NOTE — PLAN OF CARE
Goal Outcome Evaluation:   Patient showed signs of anxiety, PRN meds given. Patient compliant with care. Patient remains on room air, saturations remaining above 90%. Patient complained of pain PRN medication given. Patient resting in bed, call light in reach.

## 2024-08-05 NOTE — NURSING NOTE
Received consult for PI to right heel and sacrum.    Unstageable PI noted to right heel.  9x4cm.  Moist necrotic tissue covering wound bed.  Small amount of serosanguineous drainage noted to packing.  Apurva wound skin is red.  Order placed to cleanse wound with wound cleanser, apply TheraHoney to base of wound, gently pack with fluffed gauze dampened in NS, and cover with silicone bordered dressing BID.    Stage II PI noted to sacrum.  1x1x0.1cm.  Wound bed red, nonblanchable and moist.  Apurva wound red and intact.  Scant serous drainage noted to wound bed.  Order placed to cleanse with wound cleanser, apply TheraHoney to base of wound, and cover with silicone bordered dressing BID.    Helio score 13.  PI prevention orders initiated.       08/05/24 1215   Wound 07/26/24 1019 Right posterior heel Pressure Injury   Placement Date/Time: 07/26/24 1019   Present on Original Admission: Yes  Side: Right  Orientation: posterior  Location: heel  Primary Wound Type: Pressure Injury   Pressure Injury Stage U   Dressing Appearance intact   Base necrotic;moist   Periwound redness   Periwound Temperature warm   Periwound Skin Turgor soft   Edges irregular   Wound Length (cm) 9 cm   Wound Width (cm) 4 cm   Wound Surface Area (cm^2) 36 cm^2   Drainage Characteristics/Odor serosanguineous   Drainage Amount small   Care, Wound cleansed with;wound cleanser;honey applied   Dressing Care dressing applied   Periwound Care dry periwound area maintained   Wound 07/26/24 1046 medial sacral spine Pressure Injury   Placement Date/Time: 07/26/24 1046   Present on Original Admission: Yes  Orientation: medial  Location: sacral spine  Primary Wound Type: Pressure Injury   Pressure Injury Stage 2   Dressing Appearance intact   Closure None   Base moist;red   Periwound intact;redness   Periwound Temperature warm   Periwound Skin Turgor soft   Edges open   Wound Length (cm) 1 cm   Wound Width (cm) 1 cm   Wound Depth (cm) 0.1 cm   Wound Surface Area  (cm^2) 1 cm^2   Wound Volume (cm^3) 0.1 cm^3   Drainage Characteristics/Odor serous   Drainage Amount scant   Care, Wound cleansed with;wound cleanser;honey applied   Dressing Care dressing applied   Periwound Care dry periwound area maintained

## 2024-08-05 NOTE — CASE MANAGEMENT/SOCIAL WORK
Discharge Planning Assessment   Jose Alfredo     Patient Name: Reny Elena  MRN: [id number]  Today's Date: 8/5/2024    Admit Date: 8/2/2024    Plan: SS received a consult for [facility] has [facility]. CM done initial consult on this date. SS followed up with [facility] per Geraldine on this date who states pt is current with their services and receives skilled nursing. Per Geraldine, pt will require resumption orders faxed at discharge but will need a new face to face if pt has new orders for home health.  SS to follow and assist with discharge planning as needed.       Discharge Plan       Row Name 08/05/24 1601       Plan    Plan SS received a consult for [facility] has [facility]. CM done initial consult on this date. SS followed up with [facility] per Geraldine on this date who states pt is current with their services and receives skilled nursing. Per Geraldine, pt will require resumption orders faxed at discharge but will need a new face to face if pt has new orders for home health.  SS to follow and assist with discharge planning as needed.    Patient/Family in Agreement with Plan yes        Expected Discharge Date and Time       Expected Discharge Date Expected Discharge Time    Aug 8, 2024       Demographic Summary       Row Name 08/05/24 1600       General Information    Admission Type inpatient    Referral Source nursing    Reason for Consult discharge planning  SS received a consult for [facility] has [facility].            CAITY Julian

## 2024-08-05 NOTE — PLAN OF CARE
Goal Outcome Evaluation:           Progress: no change  Outcome Evaluation: pt resting in bed, prn pain meds given at pt request per MAR, wound care done on pts arrival to floor and then pt was evaulated and wound care was changed and redone by wound care RN. Will continue plan of care.

## 2024-08-05 NOTE — NURSING NOTE
Report called to 3 onel Vera, who states understanding of report. Pts significant other Cliff,contacted to be informed that pt was being transferred to room 330. Who also states understanding.

## 2024-08-05 NOTE — PROGRESS NOTES
"Nephrology Progress Note      Subjective     Patient patient stated she feels sick to her stomach, she has not been eating and drinking well.  Her peritoneal dialysis went very well last night.    Objective       Vital signs :     Temp:  [97.6 °F (36.4 °C)-98.4 °F (36.9 °C)] 98.4 °F (36.9 °C)  Heart Rate:  [] 100  Resp:  [18-20] 18  BP: (118-154)/(55-80) 128/69    Intake/Output                               08/03/24 0701 - 08/04/24 0700 08/04/24 0701 - 08/05/24 0700 08/05/24 0701 - 08/06/24 0700     3908-0909 8022-7838 Total 7274-9840 5413-7702 Total 1324-4770 5018-3090 Total                    Intake    P.O.  --  60 60  400  -- 400  --  -- --    I.V.  --  2522.8 2522.8  --  -- --  --  -- --    Total Intake -- 2582.8 2582.8 400 -- 400 -- -- --       Output    Urine  300  200 500  150  100 250  --  -- --    Dialysis  --  -- --  133  -- 133  82  -- 82    Total Output 300 200 500 283 100 383 82 -- 82             Physical Exam:    General Appearance : alert, pleasant, appears stated age, cooperative and oriented  Lungs : clear to auscultation, respirations regular  Heart :  regular rhythm & normal rate, normal S1, S2 and no murmur, no rub  Abdomen : Soft, nondistended  Extremities : No edema,   Neurologic :   orientated to person, place, time and situation, Grossly no focal deficits        Laboratory Data :     Albumin Albumin   Date Value Ref Range Status   08/05/2024 2.2 (L) 3.5 - 5.2 g/dL Final   08/03/2024 2.3 (L) 3.5 - 5.2 g/dL Final   08/02/2024 2.9 (L) 3.5 - 5.2 g/dL Final      Magnesium No results found for: \"MG\"       PTH               No results found for: \"PTH\"    CBC and coagulation:  Results from last 7 days   Lab Units 08/05/24  0123 08/04/24  0300 08/04/24  0216 08/03/24  0534 08/02/24  1916   LACTATE mmol/L  --   --   --   --  1.1   CRP mg/dL  --   --  1.86* 1.83* 2.25*  2.34*   WBC 10*3/mm3 12.26* 7.98  --  9.04 9.44   HEMOGLOBIN g/dL 10.2* 10.1*  --  10.7* 13.0   HEMATOCRIT % 35.0 32.3*  --  " 34.4 40.5   MCV fL 108.4* 100.0*  --  98.6* 96.2   MCHC g/dL 29.1* 31.3*  --  31.1* 32.1   PLATELETS 10*3/mm3 102* 180  --  185 251     Acid/base balance:      Renal and electrolytes:    Results from last 7 days   Lab Units 08/05/24 0223 08/04/24 0216 08/03/24 0534 08/02/24  1916   SODIUM mmol/L 138 137 137 134*   POTASSIUM mmol/L 3.8 4.2 4.7 5.1   CHLORIDE mmol/L 103 104 100 94*   CO2 mmol/L 20.6* 19.1* 23.0 24.6   BUN mg/dL 21 27* 31* 30*   CREATININE mg/dL 3.19* 3.80* 4.26* 4.36*   CALCIUM mg/dL 6.9* 6.4* 6.8* 7.6*     Estimated Creatinine Clearance: 12.8 mL/min (A) (by C-G formula based on SCr of 3.19 mg/dL (H)).  @GFRCG:3@   Liver and pancreatic function:  Results from last 7 days   Lab Units 08/05/24 0223 08/03/24  0534 08/02/24  1916   ALBUMIN g/dL 2.2* 2.3* 2.9*   BILIRUBIN mg/dL 0.2 0.2 0.4   ALK PHOS U/L 205* 232* 323*   AST (SGOT) U/L 15 11 18   ALT (SGPT) U/L 9 8 11   LIPASE U/L  --   --  25         Cardiac:      Liver and pancreatic function:  Results from last 7 days   Lab Units 08/05/24 0223 08/03/24 0534 08/02/24  1916   ALBUMIN g/dL 2.2* 2.3* 2.9*   BILIRUBIN mg/dL 0.2 0.2 0.4   ALK PHOS U/L 205* 232* 323*   AST (SGOT) U/L 15 11 18   ALT (SGPT) U/L 9 8 11   LIPASE U/L  --   --  25       Medications :     aspirin, 81 mg, Oral, Daily  atorvastatin, 80 mg, Oral, Nightly  cefTRIAXone, 1,000 mg, Intravenous, Q24H  ferrous sulfate, 325 mg, Oral, Daily With Breakfast  FLUoxetine, 60 mg, Oral, Daily  folic acid, 1 mg, Oral, Daily  heparin (porcine), 5,000 Units, Subcutaneous, Q12H  insulin regular, 2-7 Units, Subcutaneous, Q6H  lacosamide, 50 mg, Oral, Q12H  levothyroxine, 100 mcg, Oral, QAM AC  losartan, 50 mg, Oral, Daily  metroNIDAZOLE, 500 mg, Intravenous, Q8H  NIFEdipine XL, 90 mg, Oral, Daily  pantoprazole, 40 mg, Oral, QAM AC  potassium chloride, 20 mEq, Oral, BID  rOPINIRole, 0.5 mg, Oral, BID  sodium chloride, 10 mL, Intravenous, Q12H  traZODone, 50 mg, Oral, Nightly  Vancomycin Pharmacy  Intermittent/Pulse Dosing, , Does not apply, Daily      sodium chloride, 50 mL/hr, Last Rate: 50 mL/hr (08/04/24 7932)          Assessment & Plan     -End-stage renal disease on peritoneal dialysis  - Type 2 diabetes mellitus with renal involvement  - Persistent nausea and vomiting  - Cachexia and malnutrition    Patient oral intake remains poor.  Will increase protein supplements to 3 times a day with Nepro    Will hold peritoneal dialysis tonight, and then continue using 1.5% dialysate avoid excessive ultrafiltration.      Nicholas Arroyo MD  08/05/24  08:22 EDT

## 2024-08-05 NOTE — PROGRESS NOTES
[facility] HOSPITALIST PROGRESS NOTE    Subjective     History:   Reny Elena is a 64 y.o. female admitted on 8/2/2024 secondary to Diabetic ulcer of right heel     Procedures: None    CC: Follow up right heel ulcer    Patient seen and examined with GLORIA Li. Awake and alert. Reports worsening nausea today and states she does not feel well enough to proceed with surgery today. No reported CP.  No acute events overnight per RN.     History taken from: patient, chart, and RN.      Objective     Vital Signs  Temp:  [98.2 °F (36.8 °C)-98.7 °F (37.1 °C)] 98.7 °F (37.1 °C)  Heart Rate:  [] 98  Resp:  [18-20] 18  BP: (102-161)/(58-80) 102/58    Intake/Output Summary (Last 24 hours) at 8/5/2024 1438  Last data filed at 8/5/2024 0741  Gross per 24 hour   Intake --   Output 182 ml   Net -182 ml         Physical Exam:  General:    Awake, alert, in no acute distress, thin, chronically ill appearing   Heart:      Normal S1 and S2. Mildly tachycardic. No significant murmur, rubs or gallops appreciated.   Lungs:     Respirations regular, even and unlabored. Lungs clear to auscultation B/L. No wheezes, rales or rhonchi.   Abdomen:   Soft. Nontender. No guarding, rebound tenderness or  organomegaly noted. Bowel sounds present x 4.   Extremities:  No clubbing, cyanosis or edema noted. Right foot currently bandaged.       Results Review:    Results from last 7 days   Lab Units 08/05/24  0123 08/04/24  0300 08/03/24  0534 08/02/24 1916   WBC 10*3/mm3 12.26* 7.98 9.04 9.44   HEMOGLOBIN g/dL 10.2* 10.1* 10.7* 13.0   PLATELETS 10*3/mm3 102* 180 185 251     Results from last 7 days   Lab Units 08/05/24  0223 08/04/24  0216 08/03/24  0534 08/02/24  1916   SODIUM mmol/L 138 137 137 134*   POTASSIUM mmol/L 3.8 4.2 4.7 5.1   CHLORIDE mmol/L 103 104 100 94*   CO2 mmol/L 20.6* 19.1* 23.0 24.6   BUN mg/dL 21 27* 31* 30*   CREATININE mg/dL 3.19* 3.80* 4.26* 4.36*   CALCIUM mg/dL 6.9* 6.4* 6.8* 7.6*   GLUCOSE mg/dL 130*  202* 119* 176*     Results from last 7 days   Lab Units 08/05/24  0223 08/03/24  0534 08/02/24  1916   BILIRUBIN mg/dL 0.2 0.2 0.4   ALK PHOS U/L 205* 232* 323*   AST (SGOT) U/L 15 11 18   ALT (SGPT) U/L 9 8 11                   Imaging Results (Last 24 Hours)       ** No results found for the last 24 hours. **              Medications:  aspirin, 81 mg, Oral, Daily  atorvastatin, 80 mg, Oral, Nightly  cefTRIAXone, 1,000 mg, Intravenous, Q24H  ferrous sulfate, 325 mg, Oral, Daily With Breakfast  FLUoxetine, 60 mg, Oral, Daily  folic acid, 1 mg, Oral, Daily  heparin (porcine), 5,000 Units, Subcutaneous, Q12H  insulin regular, 2-7 Units, Subcutaneous, Q6H  lacosamide, 50 mg, Oral, Q12H  levothyroxine, 100 mcg, Oral, QAM AC  losartan, 50 mg, Oral, Daily  metroNIDAZOLE, 500 mg, Intravenous, Q8H  NIFEdipine XL, 90 mg, Oral, Daily  pantoprazole, 40 mg, Oral, QAM AC  potassium chloride, 20 mEq, Oral, BID  rOPINIRole, 0.5 mg, Oral, BID  sodium chloride, 10 mL, Intravenous, Q12H  traZODone, 50 mg, Oral, Nightly  Vancomycin Pharmacy Intermittent/Pulse Dosing, , Does not apply, Daily      sodium chloride, 50 mL/hr, Last Rate: 50 mL/hr (08/04/24 6503)            Assessment & Plan   Right heel ulcer: Concern for exposed bone with probable osteomyelitis. No definitive abscess on CT. Wound culture from 7/26 revealing Enterococcus faecalis and mixed gram negative maurisio with repeat culture pending. Currently on broad spectrum IV antibiotics including Vanc, Rocephin and Flagyl. Surgery consulted and in in setting of pt's nonambulatory status and hardware in RLE, is recommending to proceed with right AKA. Surgery planned for today but canceled as pt has deferred. ID and surgery input appreciated.     UTI: Recent culture from 7/21 revealed E coli with repeat culture revealing low colony count of E coli. Cont Rocephin.     Possible colitis: Pt reports recent N/V, generalized abd pain and decreased PO intake. CT abd/pelvis reveals  findings possibly representing mild distal colitis. Cont Rocephin and Flagyl. Supportive treatment.     ESRD on PD: Concern for dehydration in the setting of above. IVF's initiated per nephrology. Nephrology planning to hold PD tonight. Nephrology input appreciated.     DM II, insulin dependent: Episodes of hypoglycemia intermittently. Cont hypoglycemia protocol and SSI with Accuchecks.     Severe malnutrition: Supportive treatment.     Anxiety and depression: Psychiatry consulted with recs to increase Prozac. Supportive treatment.     DVT PPX: SQ heparin    Discussed with Dr. Santoro.     Disposition Pending clinical course with tentative surgical intervention when patient agreeable.     Mark Crawford,   08/05/24  14:38 EDT

## 2024-08-05 NOTE — PROGRESS NOTES
"Patient has been vomiting throughout the night.  Reports some pain in the \"pit\" of her stomach.    Is not amenable to surgery on this day.  We discussed that, when she agrees to surgery, I will have to find a day to accommodate her case.  She voiced her understanding"

## 2024-08-05 NOTE — PROGRESS NOTES
Patient continues on day 3 vancomycin. Random vancomycin level was reported as 23.3 mg/L today. Based on this level, not give vancomycin dose today. Will continue to follow and recheck a level when appropriate.    Thank you,    Jayne Montes, PharmD

## 2024-08-05 NOTE — CASE MANAGEMENT/SOCIAL WORK
Discharge Planning Assessment  [facility]     Patient Name: Reny Elena  MRN: [id number]  Today's Date: 8/5/2024    Admit Date: 8/2/2024    Plan: NASRA spoke with patient at bedside. Patient lives at home in [place] with her significant other, Abbie, and plans to return home at discharge with Abbie providing transportation. Patient is currently active with [facility]. She has a wheel chair, bedside commode, glucometer, bp cuff, shower chair, rolator, cane and standard walker. DME supplier is [facility]. Patient is requesting a hospital bed at discharge. Her PCP is DARIUS Jimenez and she gets her prescriptions at [facility]. Patient has Medicare B only and [place] Medicaid.   Discharge Needs Assessment       Row Name 08/05/24 1458       Living Environment    People in Home significant other    Name(s) of People in Home Abbie [phone]    Current Living Arrangements home    Primary Care Provided by self    Provides Primary Care For no one    Family Caregiver if Needed significant other    Quality of Family Relationships unable to assess    Able to Return to Prior Arrangements yes       Resource/Environmental Concerns    Transportation Concerns none       Transition Planning    Patient/Family Anticipates Transition to home with family    Transportation Anticipated family or friend will provide       Discharge Needs Assessment    Readmission Within the Last 30 Days no previous admission in last 30 days    Equipment Currently Used at Home walker, standard;wheelchair;commode;rollator;shower chair;glucometer;bp cuff;cane, straight                Discharge Plan       Row Name 08/05/24 1501       Plan    Plan NASRA spoke with patient at bedside. Patient lives at home in [place] with her significant other, Abbie, and plans to return home at discharge with Abbie providing transportation. Patient is currently active with [facility]. She has a wheel chair, bedside commode,  glucometer, bp cuff, shower chair, rolator, cane and standard walker. DME supplier is [facility]. Patient is requesting a hospital bed at discharge. Her PCP is DARIUS Jimenez and she gets her prescriptions at [facility]. Patient has Medicare B only and [place] Medicaid.    Patient/Family in Agreement with Plan yes           Evy Álvarez RN

## 2024-08-05 NOTE — PROGRESS NOTES
PROGRESS NOTE         Patient Identification:  Name:  Reny Elena  Age:  64 y.o.  Sex:  female  :  1960  MRN:  [id number]  Visit Number:  [id number]  Primary Care Provider:  Susan Stephens APRN         LOS: 2 days       ----------------------------------------------------------------------------------------------------------------------  Subjective       Chief Complaints:    Vomiting        Interval History:      Patient resting in bed this morning.  Reports vomiting overnight.  Overall not feeling well today.  Currently on room air with no apparent distress.  Lungs clear to auscultation bilaterally.  Abdomen soft, nontender.  WBC elevated 12.26.  Wound culture from 8/3/2024 currently in process.  Urine culture from 2024 finalized as 25,000 colonies of E. coli.    Review of Systems:    Constitutional: no fever, chills and night sweats.  Generalized fatigue.  Eyes: no eye drainage, itching or redness.  HEENT: no mouth sores, dysphagia or nose bleed.  Respiratory: no for shortness of breath, cough or production of sputum.  Cardiovascular: no chest pain, no palpitations, no orthopnea.  Gastrointestinal:Positive nausea, vomiting. No diarrhea. No abdominal pain, hematemesis or rectal bleeding.  Genitourinary: peritoneal dialysis. no dysuria or polyuria.  Hematologic/lymphatic: no lymph node abnormalities, no easy bruising or easy bleeding.  Musculoskeletal: no muscle or joint pain.  Skin: R foot wound. No rash and no itching.  Neurological: no loss of consciousness, no seizure, no headache.  Behavioral/Psych: no depression or suicidal ideation.  Endocrine: no hot flashes.  Immunologic: negative.    ----------------------------------------------------------------------------------------------------------------------      Objective       [facility] Meds:  aspirin, 81 mg, Oral, Daily  atorvastatin, 80 mg, Oral, Nightly  cefTRIAXone, 1,000 mg, Intravenous, Q24H  ferrous sulfate, 325 mg,  Oral, Daily With Breakfast  FLUoxetine, 60 mg, Oral, Daily  folic acid, 1 mg, Oral, Daily  heparin (porcine), 5,000 Units, Subcutaneous, Q12H  insulin regular, 2-7 Units, Subcutaneous, Q6H  lacosamide, 50 mg, Oral, Q12H  levothyroxine, 100 mcg, Oral, QAM AC  losartan, 50 mg, Oral, Daily  metroNIDAZOLE, 500 mg, Intravenous, Q8H  NIFEdipine XL, 90 mg, Oral, Daily  pantoprazole, 40 mg, Oral, QAM AC  potassium chloride, 20 mEq, Oral, BID  rOPINIRole, 0.5 mg, Oral, BID  sodium chloride, 10 mL, Intravenous, Q12H  traZODone, 50 mg, Oral, Nightly  Vancomycin Pharmacy Intermittent/Pulse Dosing, , Does not apply, Daily      sodium chloride, 50 mL/hr, Last Rate: 50 mL/hr (08/04/24 9313)      ----------------------------------------------------------------------------------------------------------------------    Vital Signs:  Temp:  [98 °F (36.7 °C)-98.4 °F (36.9 °C)] 98.4 °F (36.9 °C)  Heart Rate:  [] 102  Resp:  [18-20] 18  BP: (124-154)/(55-80) 153/72  Mean Arterial Pressure (Non-Invasive) for the past 24 hrs (Last 3 readings):   Noninvasive MAP (mmHg)   08/05/24 0640 91   08/04/24 1903 99   08/04/24 1435 94     SpO2 Percentage    08/04/24 1435 08/04/24 1903 08/05/24 0640   SpO2: 97% 98% 98%     SpO2:  [97 %-98 %] 98 %  on   ;   Device (Oxygen Therapy): room air    Body mass index is 17.67 kg/m².  Wt Readings from Last 3 Encounters:   08/05/24 46.7 kg (102 lb 15.3 oz)   07/26/24 45.4 kg (100 lb)   07/26/24 51.7 kg (114 lb)        Intake/Output Summary (Last 24 hours) at 8/5/2024 1200  Last data filed at 8/5/2024 0741  Gross per 24 hour   Intake 240 ml   Output 332 ml   Net -92 ml     NPO Diet NPO Type: Sips with Meds  ----------------------------------------------------------------------------------------------------------------------      Physical Exam:    Constitutional: Frail elderly female laying in bed resting comfortably this morning.  She continues to have nausea and vomiting.  Doesn't feel well this  morning.  HENT:  Head: Normocephalic and atraumatic.  Mouth:  Moist mucous membranes.    Eyes:  Conjunctivae and EOM are normal.  No scleral icterus.  Neck:  Neck supple.  No JVD present.    Cardiovascular:  Normal rate, regular rhythm and normal heart sounds with no murmur. No edema.  Pulmonary/Chest: On room air without apparent distress.  Lungs are clear to auscultation bilaterally.  No respiratory distress, no wheezes, no crackles, with normal breath sounds and good air movement.  Abdominal:  peritoneal dialysis catheter. Soft.  Bowel sounds are normal.  No distension and no tenderness.   Musculoskeletal: Right foot wound with dressing in place.  No edema, no tenderness, and no deformity.  No swelling or redness of joints.  Neurological:  Alert and oriented to person, place, and time.  No facial droop.  No slurred speech.   Skin: Right foot wound with dressing in place.  Skin is warm and dry.  No rash noted.  No pallor.   Psychiatric:  Normal mood and affect.  Behavior is normal.        ----------------------------------------------------------------------------------------------------------------------            Results from last 7 days   Lab Units 08/05/24  0123 08/04/24  0300 08/04/24  0216 08/03/24  0534 08/02/24  1916   CRP mg/dL  --   --  1.86* 1.83* 2.25*  2.34*   LACTATE mmol/L  --   --   --   --  1.1   WBC 10*3/mm3 12.26* 7.98  --  9.04 9.44   HEMOGLOBIN g/dL 10.2* 10.1*  --  10.7* 13.0   HEMATOCRIT % 35.0 32.3*  --  34.4 40.5   MCV fL 108.4* 100.0*  --  98.6* 96.2   MCHC g/dL 29.1* 31.3*  --  31.1* 32.1   PLATELETS 10*3/mm3 102* 180  --  185 251     Results from last 7 days   Lab Units 08/05/24  0223 08/04/24  0216 08/03/24  0534 08/02/24  1916   SODIUM mmol/L 138 137 137 134*   POTASSIUM mmol/L 3.8 4.2 4.7 5.1   CHLORIDE mmol/L 103 104 100 94*   CO2 mmol/L 20.6* 19.1* 23.0 24.6   BUN mg/dL 21 27* 31* 30*   CREATININE mg/dL 3.19* 3.80* 4.26* 4.36*   CALCIUM mg/dL 6.9* 6.4* 6.8* 7.6*   GLUCOSE mg/dL  "130* 202* 119* 176*   ALBUMIN g/dL 2.2*  --  2.3* 2.9*   BILIRUBIN mg/dL 0.2  --  0.2 0.4   ALK PHOS U/L 205*  --  232* 323*   AST (SGOT) U/L 15  --  11 18   ALT (SGPT) U/L 9  --  8 11   Estimated Creatinine Clearance: 12.8 mL/min (A) (by C-G formula based on SCr of 3.19 mg/dL (H)).  No results found for: \"AMMONIA\"    Hemoglobin A1C   Date/Time Value Ref Range Status   08/02/2024 1916 6.10 (H) 4.80 - 5.60 % Final     Glucose   Date/Time Value Ref Range Status   08/05/2024 1102 129 70 - 130 mg/dL Final   08/05/2024 0642 169 (H) 70 - 130 mg/dL Final   08/05/2024 0545 66 (L) 70 - 130 mg/dL Final   08/04/2024 2346 198 (H) 70 - 130 mg/dL Final   08/04/2024 1946 189 (H) 70 - 130 mg/dL Final   08/04/2024 1620 79 70 - 130 mg/dL Final   08/04/2024 1133 60 (L) 70 - 130 mg/dL Final   08/04/2024 0639 169 (H) 70 - 130 mg/dL Final     Lab Results   Component Value Date    HGBA1C 6.10 (H) 08/02/2024     Lab Results   Component Value Date    TSH 17.140 (H) 07/21/2024    FREET4 2.1 (H) 05/17/2024       Blood Culture   Date Value Ref Range Status   08/02/2024 No growth at 24 hours  Preliminary   08/02/2024 No growth at 24 hours  Preliminary     No results found for: \"URINECX\"  No results found for: \"WOUNDCX\"  No results found for: \"STOOLCX\"  No results found for: \"RESPCX\"  Pain Management Panel  More data exists         Latest Ref Rng & Units 7/21/2024 4/28/2024   Pain Management Panel   Amphetamine, Urine Qual Negative Negative  Negative    Barbiturates Screen, Urine Negative Negative  Negative    Benzodiazepine Screen, Urine Negative Negative  Negative    Buprenorphine, Screen, Urine Negative Negative  Negative    Cocaine Screen, Urine Negative Negative  Negative    Fentanyl, Urine Negative Negative  Negative    Methadone Screen , Urine Negative Negative  Negative    Methamphetamine, Ur Negative Negative  Negative       Details             "         ----------------------------------------------------------------------------------------------------------------------  Imaging Results (Last 24 Hours)       ** No results found for the last 24 hours. **            ----------------------------------------------------------------------------------------------------------------------    Pertinent Infectious Disease Results    Thank you Dr. Crawford for allowing us to participate in the care of your patient.  As you well know, Ms. Reny Elena is a 64 y.o. female with past medical history significant for seizure disorder, type 2 diabetes, arthritis, CHF, hypothyroidism, hypertension, hyperlipidemia, GERD, PAD, ESRD, iron deficiency anemia,, who presented to [facility] Emergency Department on 8/2/2024 for nausea, vomiting, diarrhea and worsening pain of right diabetic foot wound.  Patient was recently seen in our ED on 7/21/2024 with complaints of hallucinations, fever, and right foot pain.  At this time, patient was diagnosed with right heel ulcer and UTI.  She was sent home with an antibiotic but was unsure of the name.  Patient reported she started having nausea and vomiting after taking the antibiotics so she stopped taking them.  However, she continued to have nausea and vomiting.       Patient was tachycardic in the ED.Labs show K+ at upper limit of normal, BUN 30, cr 4.36, glucose 176, alk phos 323 and albumin 2.9, otherwise normal LFTs. CRP mildly elevated at 2.34, while lactate and WBC are normal. UA shows large leuk esterase and unable to determine RBC, WBC, bacteria of squamous cells due to loaded field.  Patient's urine culture from 7/21/2024 grew E. coli.  She had also been following with wound care who it obtained a wound culture on the same day.  This culture finalized with mixed gram-negative maurisio and light growth of Enterococcus faecalis.       X-ray of the right foot performed on 8/2/2024 revealed partial amputation of the fifth  metatarsal. Bony demineralization. No acute fracture or dislocation. No lytic or blastic bony lesions seen. Diffuse interphalangeal joint space loss. Mild degenerative changes in the midfoot. No cortical erosion. Fixation timur in the tibia/talus/calcaneus noted. Diffuse soft tissue swelling. Vascular calcifications. No soft tissue gas.  CT abdomen/pelvis revealed peritoneal dialysis catheter noted in the anterior right abdominal wall with the catheter noted to terminate in the left lateral pelvis. Small volume of free fluid in the right upper quadrant and lower posterior pelvis and right lower quadrant. Slightly elevated left hemidiaphragm. No bowel or renal obstruction. No abdominal aortic aneurysm. No features of acute appendicitis. Stranding identified in the presacral space could present changes related to mild distal colitis. Fluid-filled bladder with small volume of air in the bladder lumen. No pneumatosis. No conclusive features of free air. Distended configuration to the gallbladder likely due to fasting. No conclusive features of gastritis or enteritis.       Patient continued to have nausea and vomiting after 2 doses of Zofran.  She was admitted for further treatment.  Subsequently, patient was started on IV Rocephin in the setting of E. coli UTI.  Flagyl was added to cover possible colitis.  Patient was also started on IV vancomycin for Enterococcus faecalis wound infection.  General surgery consulted for possible debridement of patient's wound.     Assessment/Plan       Assessment     R foot wound infection, Enterococcus faecalis  Possible osteomyelitis of R foot  Hardware present in RLE  E. Coli UTI  Possible colitis         Plan      Patient resting in bed this morning.  Reports vomiting overnight.  Overall not feeling well today.  Currently on room air with no apparent distress.  Lungs clear to auscultation bilaterally.  Abdomen soft, nontender.  WBC elevated 12.26.  Wound culture from 8/3/2024  currently in process.  Urine culture from 8/30/2024 finalized as 25,000 colonies of E. coli.    Patient is currently receiving vancomycin in the setting of Enterococcus faecalis right foot wound infection.  She is also receiving Flagyl for possible colitis.  Lastly, patient is receiving Rocephin in the setting of E. coli UTI.  We will continue with this antibiotic regimen until new wound cultures have finalized.  Patient awaiting AKA for extensive wound infection with hardware.  We will continue to monitor patient closely and adjust antibiotic coverage as appropriate.      ANTIMICROBIAL THERAPY    cefdinir - 300 MG  cefTRIAXone (ROCEPHIN) 1000 mg IVPB in 100 mL NS (VTB)  metroNIDAZOLE - 500-0.79 MG/100ML-%  Vancomycin Pharmacy Intermittent/Pulse Dosing     Code Status:   Code Status and Medical Interventions: No CPR (Do Not Attempt to Resuscitate); Limited Support; No intubation (DNI)   Ordered at: 08/04/24 1645     Medical Intervention Limits:    No intubation (DNI)     Code Status (Patient has no pulse and is not breathing):    No CPR (Do Not Attempt to Resuscitate)     Medical Interventions (Patient has pulse or is breathing):    Limited Support       DARIUS Moreno  08/05/24  12:00 EDT

## 2024-08-06 LAB
ALBUMIN SERPL-MCNC: 1.9 G/DL (ref 3.5–5.2)
ALBUMIN/GLOB SERPL: 0.7 G/DL
ALP SERPL-CCNC: 189 U/L (ref 39–117)
ALT SERPL W P-5'-P-CCNC: 6 U/L (ref 1–33)
ANION GAP SERPL CALCULATED.3IONS-SCNC: 11.2 MMOL/L (ref 5–15)
AST SERPL-CCNC: 16 U/L (ref 1–32)
BASOPHILS # BLD AUTO: 0.01 10*3/MM3 (ref 0–0.2)
BASOPHILS NFR BLD AUTO: 0.1 % (ref 0–1.5)
BILIRUB SERPL-MCNC: 0.2 MG/DL (ref 0–1.2)
BUN SERPL-MCNC: 21 MG/DL (ref 8–23)
BUN/CREAT SERPL: 6.6 (ref 7–25)
CALCIUM SPEC-SCNC: 6.6 MG/DL (ref 8.6–10.5)
CHLORIDE SERPL-SCNC: 107 MMOL/L (ref 98–107)
CO2 SERPL-SCNC: 17.8 MMOL/L (ref 22–29)
CREAT SERPL-MCNC: 3.19 MG/DL (ref 0.57–1)
DEPRECATED RDW RBC AUTO: 50 FL (ref 37–54)
EGFRCR SERPLBLD CKD-EPI 2021: 15.5 ML/MIN/1.73
EOSINOPHIL # BLD AUTO: 0.15 10*3/MM3 (ref 0–0.4)
EOSINOPHIL NFR BLD AUTO: 1.4 % (ref 0.3–6.2)
ERYTHROCYTE [DISTWIDTH] IN BLOOD BY AUTOMATED COUNT: 13.8 % (ref 12.3–15.4)
GEN 5 2HR TROPONIN T REFLEX: 121 NG/L
GLOBULIN UR ELPH-MCNC: 2.8 GM/DL
GLUCOSE BLDC GLUCOMTR-MCNC: 110 MG/DL (ref 70–130)
GLUCOSE BLDC GLUCOMTR-MCNC: 89 MG/DL (ref 70–130)
GLUCOSE BLDC GLUCOMTR-MCNC: 91 MG/DL (ref 70–130)
GLUCOSE BLDC GLUCOMTR-MCNC: 93 MG/DL (ref 70–130)
GLUCOSE BLDC GLUCOMTR-MCNC: 93 MG/DL (ref 70–130)
GLUCOSE SERPL-MCNC: 105 MG/DL (ref 65–99)
HCT VFR BLD AUTO: 30.1 % (ref 34–46.6)
HGB BLD-MCNC: 9.5 G/DL (ref 12–15.9)
IMM GRANULOCYTES # BLD AUTO: 0.06 10*3/MM3 (ref 0–0.05)
IMM GRANULOCYTES NFR BLD AUTO: 0.6 % (ref 0–0.5)
LYMPHOCYTES # BLD AUTO: 0.99 10*3/MM3 (ref 0.7–3.1)
LYMPHOCYTES NFR BLD AUTO: 9.4 % (ref 19.6–45.3)
MCH RBC QN AUTO: 31.3 PG (ref 26.6–33)
MCHC RBC AUTO-ENTMCNC: 31.6 G/DL (ref 31.5–35.7)
MCV RBC AUTO: 99 FL (ref 79–97)
MONOCYTES # BLD AUTO: 0.41 10*3/MM3 (ref 0.1–0.9)
MONOCYTES NFR BLD AUTO: 3.9 % (ref 5–12)
NEUTROPHILS NFR BLD AUTO: 8.93 10*3/MM3 (ref 1.7–7)
NEUTROPHILS NFR BLD AUTO: 84.6 % (ref 42.7–76)
NRBC BLD AUTO-RTO: 0 /100 WBC (ref 0–0.2)
PLATELET # BLD AUTO: 178 10*3/MM3 (ref 140–450)
PMV BLD AUTO: 9.5 FL (ref 6–12)
POTASSIUM SERPL-SCNC: 5.1 MMOL/L (ref 3.5–5.2)
PROT SERPL-MCNC: 4.7 G/DL (ref 6–8.5)
RBC # BLD AUTO: 3.04 10*6/MM3 (ref 3.77–5.28)
SODIUM SERPL-SCNC: 136 MMOL/L (ref 136–145)
TROPONIN T DELTA: -3 NG/L
TROPONIN T SERPL HS-MCNC: 124 NG/L
WBC NRBC COR # BLD AUTO: 10.55 10*3/MM3 (ref 3.4–10.8)

## 2024-08-06 PROCEDURE — 25010000002 METRONIDAZOLE 500 MG/100ML SOLUTION: Performed by: INTERNAL MEDICINE

## 2024-08-06 PROCEDURE — 85025 COMPLETE CBC W/AUTO DIFF WBC: CPT | Performed by: INTERNAL MEDICINE

## 2024-08-06 PROCEDURE — 93005 ELECTROCARDIOGRAM TRACING: CPT | Performed by: INTERNAL MEDICINE

## 2024-08-06 PROCEDURE — 80053 COMPREHEN METABOLIC PANEL: CPT | Performed by: INTERNAL MEDICINE

## 2024-08-06 PROCEDURE — 25010000002 HEPARIN (PORCINE) PER 1000 UNITS: Performed by: INTERNAL MEDICINE

## 2024-08-06 PROCEDURE — 97162 PT EVAL MOD COMPLEX 30 MIN: CPT

## 2024-08-06 PROCEDURE — 25010000002 HYDROMORPHONE PER 4 MG: Performed by: INTERNAL MEDICINE

## 2024-08-06 PROCEDURE — 63710000001 ONDANSETRON ODT 4 MG TABLET DISPERSIBLE: Performed by: INTERNAL MEDICINE

## 2024-08-06 PROCEDURE — 82948 REAGENT STRIP/BLOOD GLUCOSE: CPT

## 2024-08-06 PROCEDURE — 25010000002 CEFTRIAXONE PER 250 MG: Performed by: INTERNAL MEDICINE

## 2024-08-06 PROCEDURE — 25010000002 PROCHLORPERAZINE 10 MG/2ML SOLUTION: Performed by: INTERNAL MEDICINE

## 2024-08-06 PROCEDURE — 99233 SBSQ HOSP IP/OBS HIGH 50: CPT | Performed by: NURSE PRACTITIONER

## 2024-08-06 PROCEDURE — 84484 ASSAY OF TROPONIN QUANT: CPT | Performed by: INTERNAL MEDICINE

## 2024-08-06 PROCEDURE — 99232 SBSQ HOSP IP/OBS MODERATE 35: CPT | Performed by: INTERNAL MEDICINE

## 2024-08-06 RX ADMIN — LACOSAMIDE 50 MG: 50 TABLET, FILM COATED ORAL at 21:20

## 2024-08-06 RX ADMIN — HEPARIN SODIUM 5000 UNITS: 5000 INJECTION INTRAVENOUS; SUBCUTANEOUS at 08:17

## 2024-08-06 RX ADMIN — FLUOXETINE HYDROCHLORIDE 60 MG: 20 CAPSULE ORAL at 08:16

## 2024-08-06 RX ADMIN — PROCHLORPERAZINE EDISYLATE 2.5 MG: 5 INJECTION INTRAMUSCULAR; INTRAVENOUS at 10:48

## 2024-08-06 RX ADMIN — METRONIDAZOLE 500 MG: 500 INJECTION, SOLUTION INTRAVENOUS at 10:48

## 2024-08-06 RX ADMIN — ATORVASTATIN CALCIUM 80 MG: 40 TABLET, FILM COATED ORAL at 21:20

## 2024-08-06 RX ADMIN — HYDROCODONE BITARTRATE AND ACETAMINOPHEN 1 TABLET: 10; 325 TABLET ORAL at 18:04

## 2024-08-06 RX ADMIN — METRONIDAZOLE 500 MG: 500 INJECTION, SOLUTION INTRAVENOUS at 02:51

## 2024-08-06 RX ADMIN — HYDROXYZINE HYDROCHLORIDE 25 MG: 25 TABLET ORAL at 18:04

## 2024-08-06 RX ADMIN — ONDANSETRON 4 MG: 4 TABLET, ORALLY DISINTEGRATING ORAL at 18:04

## 2024-08-06 RX ADMIN — ROPINIROLE HYDROCHLORIDE 0.5 MG: 0.25 TABLET, FILM COATED ORAL at 21:20

## 2024-08-06 RX ADMIN — TRAZODONE HYDROCHLORIDE 50 MG: 50 TABLET ORAL at 21:20

## 2024-08-06 RX ADMIN — ASPIRIN 81 MG: 81 TABLET, COATED ORAL at 08:16

## 2024-08-06 RX ADMIN — HEPARIN SODIUM 5000 UNITS: 5000 INJECTION INTRAVENOUS; SUBCUTANEOUS at 21:21

## 2024-08-06 RX ADMIN — LEVOTHYROXINE SODIUM 100 MCG: 0.1 TABLET ORAL at 08:17

## 2024-08-06 RX ADMIN — ROPINIROLE HYDROCHLORIDE 0.5 MG: 0.25 TABLET, FILM COATED ORAL at 08:16

## 2024-08-06 RX ADMIN — LOSARTAN POTASSIUM 50 MG: 50 TABLET, FILM COATED ORAL at 08:17

## 2024-08-06 RX ADMIN — FOLIC ACID 1 MG: 1 TABLET ORAL at 08:17

## 2024-08-06 RX ADMIN — NIFEDIPINE 90 MG: 30 TABLET, FILM COATED, EXTENDED RELEASE ORAL at 08:17

## 2024-08-06 RX ADMIN — HYDROMORPHONE HYDROCHLORIDE 0.5 MG: 1 INJECTION, SOLUTION INTRAMUSCULAR; INTRAVENOUS; SUBCUTANEOUS at 02:53

## 2024-08-06 RX ADMIN — FERROUS SULFATE TAB 325 MG (65 MG ELEMENTAL FE) 325 MG: 325 (65 FE) TAB at 08:16

## 2024-08-06 RX ADMIN — CEFTRIAXONE 1000 MG: 1 INJECTION, POWDER, FOR SOLUTION INTRAMUSCULAR; INTRAVENOUS at 21:21

## 2024-08-06 RX ADMIN — METRONIDAZOLE 500 MG: 500 INJECTION, SOLUTION INTRAVENOUS at 19:39

## 2024-08-06 RX ADMIN — Medication 10 ML: at 21:21

## 2024-08-06 RX ADMIN — Medication 10 ML: at 08:20

## 2024-08-06 RX ADMIN — LACOSAMIDE 50 MG: 50 TABLET, FILM COATED ORAL at 08:17

## 2024-08-06 RX ADMIN — PANTOPRAZOLE SODIUM 40 MG: 40 TABLET, DELAYED RELEASE ORAL at 08:17

## 2024-08-06 RX ADMIN — POTASSIUM CHLORIDE 20 MEQ: 1500 TABLET, EXTENDED RELEASE ORAL at 08:17

## 2024-08-06 NOTE — PROGRESS NOTES
[facility] HOSPITALIST PROGRESS NOTE    Subjective     History:   Reny Elena is a 64 y.o. female admitted on 8/2/2024 secondary to Diabetic ulcer of right heel     Procedures: None    CC: Follow up right heel ulcer    Patient seen and examined with GLORIA Vickers. Awake and alert. Continues to report nausea today.  No reported CP.  No acute events overnight per RN.     History taken from: patient, chart, and RN.      Objective     Vital Signs  Temp:  [97.7 °F (36.5 °C)-98 °F (36.7 °C)] 97.9 °F (36.6 °C)  Heart Rate:  [] 103  Resp:  [18] 18  BP: (127-155)/(55-78) 139/66    Intake/Output Summary (Last 24 hours) at 8/6/2024 1449  Last data filed at 8/6/2024 1200  Gross per 24 hour   Intake 514 ml   Output 850 ml   Net -336 ml         Physical Exam:  General:    Awake, alert, in no acute distress, thin, chronically ill appearing   Heart:      Normal S1 and S2. Mildly tachycardic. No significant murmur, rubs or gallops appreciated.   Lungs:     Respirations regular, even and unlabored. Lungs clear to auscultation B/L. No wheezes, rales or rhonchi.   Abdomen:   Soft. Nontender. No guarding, rebound tenderness or  organomegaly noted. Bowel sounds present x 4.   Extremities:  No clubbing, cyanosis or edema noted. Right foot currently bandaged with drainage appreciated.        Results Review:    Results from last 7 days   Lab Units 08/06/24 0229 08/05/24  0123 08/04/24  0300 08/03/24 0534 08/02/24 1916   WBC 10*3/mm3 10.55 12.26* 7.98 9.04 9.44   HEMOGLOBIN g/dL 9.5* 10.2* 10.1* 10.7* 13.0   PLATELETS 10*3/mm3 178 102* 180 185 251     Results from last 7 days   Lab Units 08/06/24 0229 08/05/24 0223 08/04/24  0216 08/03/24 0534 08/02/24 1916   SODIUM mmol/L 136 138 137 137 134*   POTASSIUM mmol/L 5.1 3.8 4.2 4.7 5.1   CHLORIDE mmol/L 107 103 104 100 94*   CO2 mmol/L 17.8* 20.6* 19.1* 23.0 24.6   BUN mg/dL 21 21 27* 31* 30*   CREATININE mg/dL 3.19* 3.19* 3.80* 4.26* 4.36*   CALCIUM mg/dL 6.6* 6.9*  6.4* 6.8* 7.6*   GLUCOSE mg/dL 105* 130* 202* 119* 176*     Results from last 7 days   Lab Units 08/06/24  0229 08/05/24  0223 08/03/24  0534 08/02/24  1916   BILIRUBIN mg/dL 0.2 0.2 0.2 0.4   ALK PHOS U/L 189* 205* 232* 323*   AST (SGOT) U/L 16 15 11 18   ALT (SGPT) U/L 6 9 8 11                   Imaging Results (Last 24 Hours)       ** No results found for the last 24 hours. **              Medications:  aspirin, 81 mg, Oral, Daily  atorvastatin, 80 mg, Oral, Nightly  cefTRIAXone, 1,000 mg, Intravenous, Q24H  ferrous sulfate, 325 mg, Oral, Daily With Breakfast  FLUoxetine, 60 mg, Oral, Daily  folic acid, 1 mg, Oral, Daily  heparin (porcine), 5,000 Units, Subcutaneous, Q12H  insulin regular, 2-7 Units, Subcutaneous, Q6H  lacosamide, 50 mg, Oral, Q12H  levothyroxine, 100 mcg, Oral, QAM AC  losartan, 50 mg, Oral, Daily  metroNIDAZOLE, 500 mg, Intravenous, Q8H  NIFEdipine XL, 90 mg, Oral, Daily  pantoprazole, 40 mg, Oral, QAM AC  potassium chloride, 20 mEq, Oral, BID  rOPINIRole, 0.5 mg, Oral, BID  sodium chloride, 10 mL, Intravenous, Q12H  traZODone, 50 mg, Oral, Nightly  Vancomycin Pharmacy Intermittent/Pulse Dosing, , Does not apply, Daily                 Assessment & Plan   Right heel ulcer: Concern for exposed bone with probable osteomyelitis. No definitive abscess on CT. Wound culture from 7/26 revealing Enterococcus faecalis and mixed gram negative maurisio with repeat culture revealing mixed gram negative maurisio. Currently on broad spectrum IV antibiotics including Vanc, Rocephin and Flagyl. Surgery consulted and in in setting of pt's nonambulatory status and hardware in RLE, is recommending to proceed with right AKA. Surgery planned for 8/5 but canceled as pt deferred. Surgery discussing possible tentative date for surgery. ID and surgery input appreciated.     UTI: Recent culture from 7/21 revealed E coli with repeat culture revealing low colony count of E coli. Cont Rocephin.     Possible colitis: Pt reports  recent N/V, generalized abd pain and decreased PO intake. CT abd/pelvis reveals findings possibly representing mild distal colitis. Cont Rocephin and Flagyl. Supportive treatment.     ESRD on PD: Concern for dehydration in the setting of above. IVF's initiated on admission per nephrology. Nephrology planning for PD tonight. Nephrology input appreciated.     DM II, insulin dependent: Episodes of hypoglycemia intermittently. Cont hypoglycemia protocol and SSI with Accuchecks.     Severe malnutrition: Supportive treatment.     Anxiety and depression: Psychiatry consulted with recs to increase Prozac. Supportive treatment.     DVT PPX: SQ heparin    Discussed with Dr. Santoro.     Disposition Pending clinical course with tentative surgical intervention when patient agreeable.     Mrak Crawford,   08/06/24  14:49 EDT

## 2024-08-06 NOTE — THERAPY EVALUATION
Acute Care - Physical Therapy Initial Evaluation  EDWARD Veliz     Patient Name: Reny Elena  : 1960  MRN: [id number]  Today's Date: 2024      Visit Dx:     ICD-10-CM ICD-9-CM   1. Diabetic ulcer of right heel associated with type 2 diabetes mellitus, with bone involvement without evidence of necrosis  E11.621 250.80    L97.416 707.14   2. Peritoneal dialysis finding  Z99.2 V45.11   3. Nausea and vomiting, unspecified vomiting type  R11.2 787.01   4. Acute cystitis with hematuria  N30.01 595.0   5. Ulcer of right foot, unspecified ulcer stage  L97.519 707.15     Patient Active Problem List   Diagnosis    Tibia/fibula fracture    Iron deficiency anemia    Type 2 diabetes mellitus with hyperglycemia, with long-term current use of insulin    Wound of right ankle    Cellulitis    Metabolic encephalopathy    History of non-ST elevation myocardial infarction (NSTEMI)    HTN (hypertension)    CVA (cerebral vascular accident)    Chronic diastolic CHF (congestive heart failure)    Decubitus ulcer of coccyx, unspecified pressure ulcer stage    Depression    Expressive aphasia    Impaired mobility and ADLs    Hyperkalemia    Subdural hematoma    Severe malnutrition    Cerebrovascular accident (CVA), unspecified mechanism    PRES (posterior reversible encephalopathy syndrome)    Debility    Stage 5 chronic kidney disease on chronic dialysis    Seizure-like activity    Seizure    Open wound    Pressure injury of right heel, stage 3    Pressure injury of sacral region, stage 2    Pressure injury of right heel, unstageable    Diabetic ulcer of right heel     Past Medical History:   Diagnosis Date    Arthritis     Closed fracture of right fibula and tibia 2018    Depression     Diabetic ulcer of left foot associated with type 2 diabetes mellitus 2017    Diastolic dysfunction     Disease of thyroid gland     Elevated cholesterol     End stage renal disease     Essential hypertension     GERD (gastroesophageal  reflux disease)     History of transfusion     Hyperlipidemia     Iron deficiency anemia 2018    PAD (peripheral artery disease)     Renal failure     Type 2 diabetes mellitus with hyperglycemia, with long-term current use of insulin 2018     Past Surgical History:   Procedure Laterality Date    ABDOMINAL SURGERY      BACK SURGERY      Post spinal diskectomy, osteophytectomy in one lumbar interspace    CATARACT EXTRACTION      Left      SECTION      DENTAL PROCEDURE      ENDOSCOPY      FRACTURE SURGERY      right leg    HYSTERECTOMY      INCISION AND DRAINAGE LEG Left 4/15/2017    Procedure: INCISION AND DRAINAGE LOWER EXTREMITY;  Surgeon: Basilio Morris MD;  Location: [facility] OR;  Service:     INCISION AND DRAINAGE LEG Left 2017    Procedure: Irrigation and Debridment abcess diabetic wound left foot with deep culture;  Surgeon: Basilio Morris MD;  Location: [facility] OR;  Service:     INSERTION PERITONEAL DIALYSIS CATHETER N/A 2021    Procedure: INSERTION PERITONEAL DIALYSIS CATHETER LAPAROSCOPIC;  Surgeon: Edy Glover MD;  Location: [facility] OR;  Service: General;  Laterality: N/A;    KNEE ARTHROSCOPY Right     ORIF TIBIA FRACTURE Right 2018    Procedure: TIBIAL  FRACTURE OPEN REDUCTION INTERNAL FIXATION;  Surgeon: Jung Barragan MD;  Location: [facility] OR;  Service: Orthopedics    TOE AMPUTATION Right     5th    TUBAL ABDOMINAL LIGATION       PT Assessment (Last 12 Hours)       PT Evaluation and Treatment       Row Name 24 9135          Physical Therapy Time and Intention    Subjective Information complains of;weakness;fatigue;pain;nausea/vomiting (P)   -KT     Document Type evaluation (P)   -KT     Mode of Treatment physical therapy (P)   -KT     Patient Effort fair (P)   -KT     Symptoms Noted During/After Treatment dizziness;fatigue;increased pain;nausea (P)   -KT       Row Name 24 3417          General Information    Patient Profile Reviewed yes (P)   -KT      Patient Observations alert;cooperative;agree to therapy;lethargic (P)   -KT     Prior Level of Function min assist:;all household mobility;ADL's (P)   -KT     Equipment Currently Used at Home rollator;cane, straight (P)   -KT     Existing Precautions/Restrictions fall (P)   -KT     Risks Reviewed patient:;LOB;nausea/vomiting;dizziness;increased discomfort (P)   -KT     Benefits Reviewed patient:;improve function;increase independence;increase strength;increase balance (P)   -KT     Barriers to Rehab medically complex;previous functional deficit (P)   -KT     Comment, General Information Pt. states that she received some assistance for ADL's prior to 3 weeks ago. Now she is dependent w/ assistance. (P)   -KT       Row Name 08/06/24 1407          Living Environment    Current Living Arrangements home (P)   -KT     People in Home significant other (P)   -KT     Primary Care Provided by self;spouse/significant other (P)   -KT       Row Name 08/06/24 1407          Home Use of Assistive/Adaptive Equipment    Equipment Currently Used at Home walker, standard;wheelchair;commode;rollator;shower chair;glucometer;bp cuff;cane, straight (P)   -KT       Row Name 08/06/24 1407          Cognition    Affect/Mental Status (Cognition) WFL (P)   -KT     Orientation Status (Cognition) oriented x 3 (P)   -KT     Follows Commands (Cognition) WFL (P)   -KT     Cognitive Function WFL (P)   -KT       Row Name 08/06/24 1407          Range of Motion (ROM)    Range of Motion ROM is WFL;bilateral lower extremities (P)   -KT       Row Name 08/06/24 1407          Strength (Manual Muscle Testing)    Strength (Manual Muscle Testing) strength is WFL;bilateral lower extremities (P)   -KT       Row Name 08/06/24 1407          Bed Mobility    Bed Mobility bed mobility (all) activities (P)   -KT     All Activities, [place] (Bed Mobility) dependent (less than 25% patient effort);2 person assist (P)   -KT     Bed Mobility, Safety Issues decreased use  of legs for bridging/pushing;impaired trunk control for bed mobility (P)   -KT     Assistive Device (Bed Mobility) bed rails;draw sheet;head of bed elevated (P)   -KT       Row Name 08/06/24 1407          Transfers    Comment, (Transfers) unable to assess d/t pt. nausea and vomiting (P)   -KT       Row Name 08/06/24 1407          Gait/Stairs (Locomotion)    Patient was able to Ambulate no, other medical factors prevent ambulation (P)   -KT     Reason Patient was unable to Ambulate Excessive Weakness;Nausea/Vomiting (P)   -KT       Row Name 08/06/24 1407          Safety Issues, Functional Mobility    Impairments Affecting Function (Mobility) balance;coordination;endurance/activity tolerance;motor control;pain;strength (P)   -KT       Row Name             Wound 07/26/24 1019 Right posterior heel Pressure Injury    Wound - Properties Group Placement Date: 07/26/24  -YB Placement Time: 1019  -YB Present on Original Admission: Y  -TW Side: Right  -YB Orientation: posterior  -YB Location: heel  -YB Primary Wound Type: Pressure inj  -TW    Retired Wound - Properties Group Placement Date: 07/26/24  -YB Placement Time: 1019  -YB Present on Original Admission: Y  -TW Side: Right  -YB Orientation: posterior  -YB Location: heel  -YB Primary Wound Type: Pressure inj  -TW    Retired Wound - Properties Group Date first assessed: 07/26/24  -YB Time first assessed: 1019  -YB Present on Original Admission: Y  -TW Side: Right  -YB Location: heel  -YB Primary Wound Type: Pressure inj  -TW      Row Name             Wound 07/26/24 1046 medial sacral spine Pressure Injury    Wound - Properties Group Placement Date: 07/26/24  -YB Placement Time: 1046  -YB Present on Original Admission: Y  -TW Orientation: medial  -TW Location: sacral spine  -YB Primary Wound Type: Pressure inj  -TW    Retired Wound - Properties Group Placement Date: 07/26/24  -YB Placement Time: 1046  -YB Present on Original Admission: Y  -TW Orientation: medial  -TW  Location: sacral spine  -YB Primary Wound Type: Pressure inj  -TW    Retired Wound - Properties Group Date first assessed: 07/26/24  -YB Time first assessed: 1046  -YB Present on Original Admission: Y  -TW Location: sacral spine  -YB Primary Wound Type: Pressure inj  -TW      Row Name             Wound 08/04/24 2317 Right lower arm Extravasation    Wound - Properties Group Placement Date: 08/04/24  -SM Placement Time: 2317  -SM Present on Original Admission: N  -SM Side: Right  -SM Orientation: lower  -SM Location: arm  -SM Primary Wound Type: Extravasatio  -SM    Retired Wound - Properties Group Placement Date: 08/04/24  -SM Placement Time: 2317  -SM Present on Original Admission: N  -SM Side: Right  -SM Orientation: lower  -SM Location: arm  -SM Primary Wound Type: Extravasatio  -SM    Retired Wound - Properties Group Date first assessed: 08/04/24  -SM Time first assessed: 2317  -SM Present on Original Admission: N  -SM Side: Right  -SM Location: arm  -SM Primary Wound Type: Extravasatio  -SM      Row Name 08/06/24 1407          Plan of Care Review    Plan of Care Reviewed With patient (P)   -KT     Outcome Evaluation Pt. evaluation was completed during PT session. Pt. demonstrated dependent assistance x2 for bed mobility and transfers. Pt. was nauseous upon sitting EOB and began vomiting during session. Pt. would benefit from skilled therapy to improve mobility and strength. (P)   -KT       Row Name 08/06/24 1407          Therapy Assessment/Plan (PT)    Patient/Family Therapy Goals Statement (PT) improve mobility (P)   -KT     Functional Level at Time of Evaluation (PT) dependent x2 (P)   -KT     PT Diagnosis (PT) general weakness (P)   -KT     Rehab Potential (PT) good, to achieve stated therapy goals (P)   -KT     Criteria for Skilled Interventions Met (PT) yes;meets criteria;skilled treatment is necessary (P)   -KT     Therapy Frequency (PT) 2 times/wk (P)   2-5x/wk  -KT     Predicted Duration of Therapy  Intervention (PT) until discharge (P)   -KT     Problem List (PT) problems related to;balance;mobility;strength;pain;postural control (P)   -KT       Row Name 08/06/24 1407          Therapy Plan Review/Discharge Plan (PT)    Therapy Plan Review (PT) evaluation/treatment results reviewed;patient (P)   -KT       Row Name 08/06/24 1407          Physical Therapy Goals    Bed Mobility Goal Selection (PT) bed mobility, PT goal 1 (P)   -KT     Transfer Goal Selection (PT) transfer, PT goal 1 (P)   -KT     Gait Training Goal Selection (PT) gait training, PT goal 1 (P)   -KT       Row Name 08/06/24 1407          Bed Mobility Goal 1 (PT)    Activity/Assistive Device (Bed Mobility Goal 1, PT) bed mobility activities, all (P)   -KT     [place] Level/Cues Needed (Bed Mobility Goal 1, PT) minimum assist (75% or more patient effort) (P)   -KT     Time Frame (Bed Mobility Goal 1, PT) by discharge (P)   -KT       Row Name 08/06/24 1407          Transfer Goal 1 (PT)    Activity/Assistive Device (Transfer Goal 1, PT) transfers, all;walker, rolling (P)   -KT     [place] Level/Cues Needed (Transfer Goal 1, PT) minimum assist (75% or more patient effort) (P)   -KT     Time Frame (Transfer Goal 1, PT) by discharge (P)   -KT       Row Name 08/06/24 1407          Gait Training Goal 1 (PT)    Activity/Assistive Device (Gait Training Goal 1, PT) gait (walking locomotion);assistive device use;walker, rolling (P)   -KT     [place] Level (Gait Training Goal 1, PT) moderate assist (50-74% patient effort) (P)   -KT     Distance (Gait Training Goal 1, PT) 10 (P)   -KT     Time Frame (Gait Training Goal 1, PT) by discharge (P)   -KT               User Key  (r) = Recorded By, (t) = Taken By, (c) = Cosigned By      Initials Name Provider Type    Lauren Banks, RN Registered Nurse    Isela Newsome RN Registered Nurse    Radha Castillo, RN Registered Nurse    KT Elmer Banks, PT Student PT Student                     Physical Therapy Education       Title: PT OT SLP Therapies (Done)       Topic: Physical Therapy (Done)       Point: Mobility training (Done)       Learning Progress Summary             Patient Acceptance, E,TB, VU by KT at 8/6/2024 1420                         Point: Home exercise program (Done)       Learning Progress Summary             Patient Acceptance, E,TB, VU by KT at 8/6/2024 1420                         Point: Body mechanics (Done)       Learning Progress Summary             Patient Acceptance, E,TB, VU by KT at 8/6/2024 1420                         Point: Precautions (Done)       Learning Progress Summary             Patient Acceptance, E,TB, VU by KT at 8/6/2024 1420                                         User Key       Initials Effective Dates Name Provider Type Discipline    KT 06/21/24 -  Elmer Banks, PT Student PT Student PT                  PT Recommendation and Plan  Anticipated Discharge Disposition (PT): (P) assisted living, skilled nursing facility  Planned Therapy Interventions (PT): (P) balance training, bed mobility training, gait training, home exercise program, patient/family education, postural re-education, strengthening, stretching  Therapy Frequency (PT): (P) 2 times/wk (2-5x/wk)  Plan of Care Reviewed With: (P) patient  Outcome Evaluation: (P) Pt. evaluation was completed during PT session. Pt. demonstrated dependent assistance x2 for bed mobility and transfers. Pt. was nauseous upon sitting EOB and began vomiting during session. Pt. would benefit from skilled therapy to improve mobility and strength.       Time Calculation:    PT Charges       Row Name 08/06/24 1405             Time Calculation    PT Received On 08/06/24 (P)   -KT      PT Goal Re-Cert Due Date 08/20/24 (P)   -KT                User Key  (r) = Recorded By, (t) = Taken By, (c) = Cosigned By      Initials Name Provider Type    KT Elmer Banks, PT Student PT Student                  Therapy Charges for Today        Code Description Service Date Service Provider Modifiers Qty    99840672823 HC PT EVAL MOD COMPLEXITY 4 8/6/2024 Elmer Banks, PT Student GP 1            PT G-Codes  AM-PAC 6 Clicks Score (PT): 9    Elmer Banks, PT Student  8/6/2024

## 2024-08-06 NOTE — PROGRESS NOTES
Adult Nutrition  Assessment/PES    Patient Name:  Reny Elena  YOB: 1960  MRN: [id number]  Admit Date:  8/2/2024    Assessment Date:  8/6/2024    Comments: Po follow-up, 0-25% of meals. Supplement in place with all meals.     Pt meets criteria for:    Severe chronic disease related malnutrition related to Heel Infection, UTI, ESRD, DM as evidenced by less 75% of est energy requirement >= 1 month, severe muscle wasting & fat loss per RD exam, > 7.5% loss BW 3 months.     Encourage po, supplement, and voice food prefs as able.     Will cont to follow and monitor plan for nutrition.      Reason for Assessment       Row Name 08/06/24 1829          Reason for Assessment    Reason For Assessment follow-up protocol     Diagnosis infection/sepsis;renal disease;diabetes diagnosis/complications  R heel ulcer exposed bone OM-possible AKA, UTI, ESRD on PD, DM , Severe Malnutrition                    Nutrition/Diet History       Row Name 08/06/24 1830          Nutrition/Diet History    Typical Intake (Food/Fluid/EN/PN) Called to room no answer.                    Labs/Tests/Procedures/Meds       Row Name 08/06/24 1830          Labs/Procedures/Meds    Lab Results Reviewed reviewed     Lab Results Comments creat 3.1 H        Diagnostic Tests/Procedures    Diagnostic Test/Procedure Reviewed reviewed        Medications    Pertinent Medications Reviewed reviewed     Pertinent Medications Comments KCL, humlin, folic acid                    Physical Findings       Row Name 08/06/24 1831          Physical Findings    Overall Physical Appearance see WOCN, sacral/foot wounds                      Nutrition Prescription Ordered       Row Name 08/06/24 1831          Nutrition Prescription PO    Supplement Novasource Renal (Nepro)     Supplement Frequency 3 times a day     Common Modifiers Consistent Carbohydrate                    Evaluation of Received Nutrient/Fluid Intake       Row Name 08/06/24 1831          Fluid  Intake Evaluation    Oral Fluid (mL) 293  ave x 3, 17%        PO Evaluation    % PO Intake 0-25%                       Problem/Interventions:   Problem 1       Row Name 08/06/24 1833          Nutrition Diagnoses Problem 1    Problem 1 Other (comment)  Severe chronic disease related malnutrition related to Heel Infection, UTI, ESRD, DM as evidenced by less 75% of est energy requirement >= 1 month, severe muscle wasting & fat loss per RD exam, > 7.5% loss BW 3 months.                          Intervention Goal       Row Name 08/06/24 6186          Intervention Goal    General Meet nutritional needs for age/condition     PO Increase intake;PO intake (%)     PO Intake % 50 %                    Nutrition Intervention       Row Name 08/06/24 1836          Nutrition Intervention    RD/Tech Action Encourage intake;Supplement provided;Follow Tx progress                      Education/Evaluation       Row Name 08/06/24 4596          Education    Education No discharge needs identified at this time        Monitor/Evaluation    Monitor Per protocol;I&O;PO intake;Supplement intake;Pertinent labs;Weight                     Electronically signed by:  Lay Gonzalez RD  08/06/24 18:36 EDT

## 2024-08-06 NOTE — PLAN OF CARE
Goal Outcome Evaluation:      Pt given PRN pain med, A/O, wound care completed, pt tolerated well, VSS, pt is resting in bed at this time without complaint will follow POC.

## 2024-08-06 NOTE — PROGRESS NOTES
"Nephrology Progress Note      Subjective     No chest pain, shortness of breath.  Nausea is better compared to yesterday    Objective       Vital signs :     Temp:  [97.7 °F (36.5 °C)-98.7 °F (37.1 °C)] 97.7 °F (36.5 °C)  Heart Rate:  [] 106  Resp:  [18] 18  BP: (102-161)/(55-80) 155/75    Intake/Output                               08/04/24 0701 - 08/05/24 0700 08/05/24 0701 - 08/06/24 0700 08/06/24 0701 - 08/07/24 0700     0142-3947 4484-4224 Total 5920-6405 7432-1901 Total 8449-0073 6323-9526 Total                    Intake    P.O.  400  -- 400  --  240 240  0  -- 0    I.V.  --  -- --  34  -- 34  --  -- --    Total Intake 400 -- 400 34 240 274 0 -- 0       Output    Urine  150  100 250  --  850 850  --  -- --    Dialysis  133  -- 133  82  -- 82  --  -- --    Total Output 283 100 383 82 850 932 -- -- --             Physical Exam:    General Appearance : alert, pleasant, appears stated age, cooperative and oriented  Lungs : clear to auscultation, respirations regular  Heart :  regular rhythm & normal rate, normal S1, S2 and no murmur, no rub  Abdomen : Soft, nondistended  Extremities : No edema,   Neurologic :   orientated to person, place, time and situation, Grossly no focal deficits        Laboratory Data :     Albumin Albumin   Date Value Ref Range Status   08/06/2024 1.9 (L) 3.5 - 5.2 g/dL Final   08/05/2024 2.2 (L) 3.5 - 5.2 g/dL Final      Magnesium No results found for: \"MG\"       PTH               No results found for: \"PTH\"    CBC and coagulation:  Results from last 7 days   Lab Units 08/06/24  0229 08/05/24  0123 08/04/24  0300 08/04/24  0216 08/03/24  0534 08/02/24  1916   LACTATE mmol/L  --   --   --   --   --  1.1   CRP mg/dL  --   --   --  1.86* 1.83* 2.25*  2.34*   WBC 10*3/mm3 10.55 12.26* 7.98  --  9.04 9.44   HEMOGLOBIN g/dL 9.5* 10.2* 10.1*  --  10.7* 13.0   HEMATOCRIT % 30.1* 35.0 32.3*  --  34.4 40.5   MCV fL 99.0* 108.4* 100.0*  --  98.6* 96.2   MCHC g/dL 31.6 29.1* 31.3*  --  31.1* " 32.1   PLATELETS 10*3/mm3 178 102* 180  --  185 251     Acid/base balance:      Renal and electrolytes:    Results from last 7 days   Lab Units 08/06/24 0229 08/05/24 0223 08/04/24 0216 08/03/24  0534 08/02/24  1916   SODIUM mmol/L 136 138 137 137 134*   POTASSIUM mmol/L 5.1 3.8 4.2 4.7 5.1   CHLORIDE mmol/L 107 103 104 100 94*   CO2 mmol/L 17.8* 20.6* 19.1* 23.0 24.6   BUN mg/dL 21 21 27* 31* 30*   CREATININE mg/dL 3.19* 3.19* 3.80* 4.26* 4.36*   CALCIUM mg/dL 6.6* 6.9* 6.4* 6.8* 7.6*     Estimated Creatinine Clearance: 13.2 mL/min (A) (by C-G formula based on SCr of 3.19 mg/dL (H)).  @GFRCG:3@   Liver and pancreatic function:  Results from last 7 days   Lab Units 08/06/24 0229 08/05/24 0223 08/03/24  0534 08/02/24  1916   ALBUMIN g/dL 1.9* 2.2* 2.3* 2.9*   BILIRUBIN mg/dL 0.2 0.2 0.2 0.4   ALK PHOS U/L 189* 205* 232* 323*   AST (SGOT) U/L 16 15 11 18   ALT (SGPT) U/L 6 9 8 11   LIPASE U/L  --   --   --  25         Cardiac:      Liver and pancreatic function:  Results from last 7 days   Lab Units 08/06/24 0229 08/05/24 0223 08/03/24  0534 08/02/24  1916   ALBUMIN g/dL 1.9* 2.2* 2.3* 2.9*   BILIRUBIN mg/dL 0.2 0.2 0.2 0.4   ALK PHOS U/L 189* 205* 232* 323*   AST (SGOT) U/L 16 15 11 18   ALT (SGPT) U/L 6 9 8 11   LIPASE U/L  --   --   --  25       Medications :     aspirin, 81 mg, Oral, Daily  atorvastatin, 80 mg, Oral, Nightly  cefTRIAXone, 1,000 mg, Intravenous, Q24H  ferrous sulfate, 325 mg, Oral, Daily With Breakfast  FLUoxetine, 60 mg, Oral, Daily  folic acid, 1 mg, Oral, Daily  heparin (porcine), 5,000 Units, Subcutaneous, Q12H  insulin regular, 2-7 Units, Subcutaneous, Q6H  lacosamide, 50 mg, Oral, Q12H  levothyroxine, 100 mcg, Oral, QAM AC  losartan, 50 mg, Oral, Daily  metroNIDAZOLE, 500 mg, Intravenous, Q8H  NIFEdipine XL, 90 mg, Oral, Daily  pantoprazole, 40 mg, Oral, QAM AC  potassium chloride, 20 mEq, Oral, BID  rOPINIRole, 0.5 mg, Oral, BID  sodium chloride, 10 mL, Intravenous, Q12H  traZODone,  50 mg, Oral, Nightly  Vancomycin Pharmacy Intermittent/Pulse Dosing, , Does not apply, Daily               Assessment & Plan     -End-stage renal disease on peritoneal dialysis  - Type 2 diabetes mellitus with renal involvement  - Persistent nausea and vomiting  - Cachexia and malnutrition    Patient's oral intake is slightly improved compared to yesterday.  Will start the patient back on peritoneal dialysis using 1.5% dialysate to avoid any ultrafiltration    Continue on Nepro supplements 3 times a day      Nicholas Arroyo MD  08/06/24  09:07 EDT

## 2024-08-06 NOTE — PLAN OF CARE
Goal Outcome Evaluation:  Plan of Care Reviewed With: patient        Progress: no change  Outcome Evaluation: Patient's VSS. Pt wound care done per ordes. Pt worked with PT during shift. Pt c/o of nausea during shift & PRN medication given. Pt voiced no further concerns at this time.

## 2024-08-06 NOTE — PROGRESS NOTES
Antimicrobial Length of Therapy:  Rocephin day 5 of 7  Flagyl day 4 of 5  Vancomycin day 4 of 5      Kirk Thomas, PharmD  Pharmacy Resident  08/06/24  13:00 EDT

## 2024-08-06 NOTE — PROGRESS NOTES
PROGRESS NOTE         Patient Identification:  Name:  Reny Elena  Age:  64 y.o.  Sex:  female  :  1960  MRN:  [id number]  Visit Number:  [id number]  Primary Care Provider:  Susan Stephens APRN         LOS: 3 days       ----------------------------------------------------------------------------------------------------------------------  Subjective       Chief Complaints:    Vomiting        Interval History:      Patient resting in bed this morning.  Reports overall not feeling well today.  AKA procedure put off due patients decline of procedure.  Patient reports persistent nausea this morning. No episodes of vomiting.  On room air without apparent distress.  Lungs clear to auscultation bilaterally.  Soft, mild diffuse tenderness to palpation.  WBC improving at 10.55.    Review of Systems:    Constitutional: no fever, chills and night sweats.  Generalized fatigue.  Eyes: no eye drainage, itching or redness.  HEENT: no mouth sores, dysphagia or nose bleed.  Respiratory: no for shortness of breath, cough or production of sputum.  Cardiovascular: no chest pain, no palpitations, no orthopnea.  Gastrointestinal:Positive nausea, No documented vomiting. No diarrhea. No abdominal pain, hematemesis or rectal bleeding.  Genitourinary: peritoneal dialysis. no dysuria or polyuria.  Hematologic/lymphatic: no lymph node abnormalities, no easy bruising or easy bleeding.  Musculoskeletal: no muscle or joint pain.  Skin: R foot wound. No rash and no itching.  Neurological: no loss of consciousness, no seizure, no headache.  Behavioral/Psych: no depression or suicidal ideation.  Endocrine: no hot flashes.  Immunologic: negative.    ----------------------------------------------------------------------------------------------------------------------      Objective       [facility] Meds:  aspirin, 81 mg, Oral, Daily  atorvastatin, 80 mg, Oral, Nightly  cefTRIAXone, 1,000 mg, Intravenous, Q24H  ferrous  sulfate, 325 mg, Oral, Daily With Breakfast  FLUoxetine, 60 mg, Oral, Daily  folic acid, 1 mg, Oral, Daily  heparin (porcine), 5,000 Units, Subcutaneous, Q12H  insulin regular, 2-7 Units, Subcutaneous, Q6H  lacosamide, 50 mg, Oral, Q12H  levothyroxine, 100 mcg, Oral, QAM AC  losartan, 50 mg, Oral, Daily  metroNIDAZOLE, 500 mg, Intravenous, Q8H  NIFEdipine XL, 90 mg, Oral, Daily  pantoprazole, 40 mg, Oral, QAM AC  potassium chloride, 20 mEq, Oral, BID  rOPINIRole, 0.5 mg, Oral, BID  sodium chloride, 10 mL, Intravenous, Q12H  traZODone, 50 mg, Oral, Nightly  Vancomycin Pharmacy Intermittent/Pulse Dosing, , Does not apply, Daily           ----------------------------------------------------------------------------------------------------------------------    Vital Signs:  Temp:  [97.7 °F (36.5 °C)-98.7 °F (37.1 °C)] 97.7 °F (36.5 °C)  Heart Rate:  [] 106  Resp:  [18] 18  BP: (102-161)/(55-80) 155/75  No data found.    SpO2 Percentage    08/05/24 1900 08/06/24 0300 08/06/24 0600   SpO2: 99% 98% 98%     SpO2:  [98 %-99 %] 98 %  on   ;   Device (Oxygen Therapy): room air    Body mass index is 18.25 kg/m².  Wt Readings from Last 3 Encounters:   08/06/24 48.2 kg (106 lb 4.8 oz)   07/26/24 45.4 kg (100 lb)   07/26/24 51.7 kg (114 lb)        Intake/Output Summary (Last 24 hours) at 8/6/2024 1054  Last data filed at 8/6/2024 0800  Gross per 24 hour   Intake 274 ml   Output 850 ml   Net -576 ml     NPO Diet NPO Type: Sips with Meds  ----------------------------------------------------------------------------------------------------------------------      Physical Exam:    Constitutional: Frail elderly female laying in bed resting comfortably this morning.  She continues to have nausea and vomiting.  Doesn't feel well this morning.  HENT:  Head: Normocephalic and atraumatic.  Mouth:  Moist mucous membranes.    Eyes:  Conjunctivae and EOM are normal.  No scleral icterus.  Neck:  Neck supple.  No JVD present.     Cardiovascular:  Normal rate, regular rhythm and normal heart sounds with no murmur. No edema.  Pulmonary/Chest: On room air without apparent distress.  Lungs are clear to auscultation bilaterally.  No respiratory distress, no wheezes, no crackles, with normal breath sounds and good air movement.  Abdominal:  peritoneal dialysis catheter. Soft.  Bowel sounds are normal.  No distension and positive mild diffuse tenderness.  Musculoskeletal: Right foot wound with dressing in place.  No edema, no tenderness, and no deformity.  No swelling or redness of joints.  Neurological:  Alert and oriented to person, place, and time.  No facial droop.  No slurred speech.   Skin: Right foot wound with dressing in place.  Skin is warm and dry.  No rash noted.  No pallor.   Psychiatric:  Normal mood and affect.  Behavior is normal.        ----------------------------------------------------------------------------------------------------------------------            Results from last 7 days   Lab Units 08/06/24  0229 08/05/24  0123 08/04/24  0300 08/04/24  0216 08/03/24  0534 08/02/24  1916   CRP mg/dL  --   --   --  1.86* 1.83* 2.25*  2.34*   LACTATE mmol/L  --   --   --   --   --  1.1   WBC 10*3/mm3 10.55 12.26* 7.98  --  9.04 9.44   HEMOGLOBIN g/dL 9.5* 10.2* 10.1*  --  10.7* 13.0   HEMATOCRIT % 30.1* 35.0 32.3*  --  34.4 40.5   MCV fL 99.0* 108.4* 100.0*  --  98.6* 96.2   MCHC g/dL 31.6 29.1* 31.3*  --  31.1* 32.1   PLATELETS 10*3/mm3 178 102* 180  --  185 251     Results from last 7 days   Lab Units 08/06/24  0229 08/05/24  0223 08/04/24  0216 08/03/24  0534   SODIUM mmol/L 136 138 137 137   POTASSIUM mmol/L 5.1 3.8 4.2 4.7   CHLORIDE mmol/L 107 103 104 100   CO2 mmol/L 17.8* 20.6* 19.1* 23.0   BUN mg/dL 21 21 27* 31*   CREATININE mg/dL 3.19* 3.19* 3.80* 4.26*   CALCIUM mg/dL 6.6* 6.9* 6.4* 6.8*   GLUCOSE mg/dL 105* 130* 202* 119*   ALBUMIN g/dL 1.9* 2.2*  --  2.3*   BILIRUBIN mg/dL 0.2 0.2  --  0.2   ALK PHOS U/L 189* 205*   "--  232*   AST (SGOT) U/L 16 15  --  11   ALT (SGPT) U/L 6 9  --  8   Estimated Creatinine Clearance: 13.2 mL/min (A) (by C-G formula based on SCr of 3.19 mg/dL (H)).  No results found for: \"AMMONIA\"    Glucose   Date/Time Value Ref Range Status   08/06/2024 0636 91 70 - 130 mg/dL Final   08/06/2024 0550 93 70 - 130 mg/dL Final   08/05/2024 2357 110 70 - 130 mg/dL Final   08/05/2024 1626 122 70 - 130 mg/dL Final   08/05/2024 1102 129 70 - 130 mg/dL Final   08/05/2024 0642 169 (H) 70 - 130 mg/dL Final   08/05/2024 0545 66 (L) 70 - 130 mg/dL Final   08/04/2024 2346 198 (H) 70 - 130 mg/dL Final     Lab Results   Component Value Date    HGBA1C 6.10 (H) 08/02/2024     Lab Results   Component Value Date    TSH 17.140 (H) 07/21/2024    FREET4 2.1 (H) 05/17/2024       Blood Culture   Date Value Ref Range Status   08/02/2024 No growth at 24 hours  Preliminary   08/02/2024 No growth at 24 hours  Preliminary     No results found for: \"URINECX\"  No results found for: \"WOUNDCX\"  No results found for: \"STOOLCX\"  No results found for: \"RESPCX\"  Pain Management Panel  More data exists         Latest Ref Rng & Units 7/21/2024 4/28/2024   Pain Management Panel   Amphetamine, Urine Qual Negative Negative  Negative    Barbiturates Screen, Urine Negative Negative  Negative    Benzodiazepine Screen, Urine Negative Negative  Negative    Buprenorphine, Screen, Urine Negative Negative  Negative    Cocaine Screen, Urine Negative Negative  Negative    Fentanyl, Urine Negative Negative  Negative    Methadone Screen , Urine Negative Negative  Negative    Methamphetamine, Ur Negative Negative  Negative       Details                     ----------------------------------------------------------------------------------------------------------------------  Imaging Results (Last 24 Hours)       ** No results found for the last 24 hours. **      "       ----------------------------------------------------------------------------------------------------------------------    Pertinent Infectious Disease Results    Thank you Dr. Crawford for allowing us to participate in the care of your patient.  As you well know, Ms. Reny Elena is a 64 y.o. female with past medical history significant for seizure disorder, type 2 diabetes, arthritis, CHF, hypothyroidism, hypertension, hyperlipidemia, GERD, PAD, ESRD, iron deficiency anemia,, who presented to [facility] Emergency Department on 8/2/2024 for nausea, vomiting, diarrhea and worsening pain of right diabetic foot wound.  Patient was recently seen in our ED on 7/21/2024 with complaints of hallucinations, fever, and right foot pain.  At this time, patient was diagnosed with right heel ulcer and UTI.  She was sent home with an antibiotic but was unsure of the name.  Patient reported she started having nausea and vomiting after taking the antibiotics so she stopped taking them.  However, she continued to have nausea and vomiting.       Patient was tachycardic in the ED.Labs show K+ at upper limit of normal, BUN 30, cr 4.36, glucose 176, alk phos 323 and albumin 2.9, otherwise normal LFTs. CRP mildly elevated at 2.34, while lactate and WBC are normal. UA shows large leuk esterase and unable to determine RBC, WBC, bacteria of squamous cells due to loaded field.  Patient's urine culture from 7/21/2024 grew E. coli.  She had also been following with wound care who it obtained a wound culture on the same day.  This culture finalized with mixed gram-negative maurisio and light growth of Enterococcus faecalis.       X-ray of the right foot performed on 8/2/2024 revealed partial amputation of the fifth metatarsal. Bony demineralization. No acute fracture or dislocation. No lytic or blastic bony lesions seen. Diffuse interphalangeal joint space loss. Mild degenerative changes in the midfoot. No cortical erosion.  Fixation timur in the tibia/talus/calcaneus noted. Diffuse soft tissue swelling. Vascular calcifications. No soft tissue gas.  CT abdomen/pelvis revealed peritoneal dialysis catheter noted in the anterior right abdominal wall with the catheter noted to terminate in the left lateral pelvis. Small volume of free fluid in the right upper quadrant and lower posterior pelvis and right lower quadrant. Slightly elevated left hemidiaphragm. No bowel or renal obstruction. No abdominal aortic aneurysm. No features of acute appendicitis. Stranding identified in the presacral space could present changes related to mild distal colitis. Fluid-filled bladder with small volume of air in the bladder lumen. No pneumatosis. No conclusive features of free air. Distended configuration to the gallbladder likely due to fasting. No conclusive features of gastritis or enteritis.       Patient continued to have nausea and vomiting after 2 doses of Zofran.  She was admitted for further treatment.  Subsequently, patient was started on IV Rocephin in the setting of E. coli UTI.  Flagyl was added to cover possible colitis.  Patient was also started on IV vancomycin for Enterococcus faecalis wound infection.  General surgery consulted for possible debridement of patient's wound.     Assessment/Plan       Assessment     R foot wound infection, Enterococcus faecalis  Possible osteomyelitis of R foot  Hardware present in RLE  E. Coli UTI  Possible colitis         Plan      Patient resting in bed this morning.  Reports overall not feeling well today.  AKA procedure put off due patients decline of procedure.  Patient reports persistent nausea this morning. No episodes of vomiting.  On room air without apparent distress.  Lungs clear to auscultation bilaterally.  Soft, mild diffuse tenderness to palpation.  WBC improving at 10.55.    Wound culture from 8/3/2024 preliminary reports growth of mixed gram-negative maurisio.  Urine culture from 8/30/2024  finalized as 25,000 colonies of E. coli.    Patient is currently receiving vancomycin in the setting of Enterococcus faecalis right foot wound infection.  Patient is also receiving ceftriaxone and metronidazole for colitis as well as E. coli UTI and mixed gram-negative maurisio present on most recent right foot wound culture.  We will continue with this antibiotic regimen until new wound cultures have finalized.  Unfortunately patient required lifelong antibiotic therapy if AKA is not performed and removal of hardware as infection will persist due to the present hardware.  We will continue to monitor patient closely and adjust antibiotic coverage as appropriate.      ANTIMICROBIAL THERAPY    cefdinir - 300 MG  cefTRIAXone (ROCEPHIN) 1000 mg IVPB in 100 mL NS (VTB)  metroNIDAZOLE - 500-0.79 MG/100ML-%  Vancomycin Pharmacy Intermittent/Pulse Dosing     Code Status:   Code Status and Medical Interventions: No CPR (Do Not Attempt to Resuscitate); Limited Support; No intubation (DNI)   Ordered at: 08/04/24 8944     Medical Intervention Limits:    No intubation (DNI)     Code Status (Patient has no pulse and is not breathing):    No CPR (Do Not Attempt to Resuscitate)     Medical Interventions (Patient has pulse or is breathing):    Limited Support       DARIUS Moreno  08/06/24  10:54 EDT

## 2024-08-07 LAB
ANION GAP SERPL CALCULATED.3IONS-SCNC: 12 MMOL/L (ref 5–15)
BACTERIA SPEC AEROBE CULT: NORMAL
BACTERIA SPEC AEROBE CULT: NORMAL
BASOPHILS # BLD AUTO: 0.01 10*3/MM3 (ref 0–0.2)
BASOPHILS NFR BLD AUTO: 0.1 % (ref 0–1.5)
BUN SERPL-MCNC: 20 MG/DL (ref 8–23)
BUN/CREAT SERPL: 6.4 (ref 7–25)
CALCIUM SPEC-SCNC: 6.7 MG/DL (ref 8.6–10.5)
CHLORIDE SERPL-SCNC: 109 MMOL/L (ref 98–107)
CO2 SERPL-SCNC: 19 MMOL/L (ref 22–29)
CREAT SERPL-MCNC: 3.12 MG/DL (ref 0.57–1)
DEPRECATED RDW RBC AUTO: 52.8 FL (ref 37–54)
EGFRCR SERPLBLD CKD-EPI 2021: 15.9 ML/MIN/1.73
EOSINOPHIL # BLD AUTO: 0.08 10*3/MM3 (ref 0–0.4)
EOSINOPHIL NFR BLD AUTO: 1.1 % (ref 0.3–6.2)
ERYTHROCYTE [DISTWIDTH] IN BLOOD BY AUTOMATED COUNT: 14.1 % (ref 12.3–15.4)
GLUCOSE BLDC GLUCOMTR-MCNC: 100 MG/DL (ref 70–130)
GLUCOSE BLDC GLUCOMTR-MCNC: 163 MG/DL (ref 70–130)
GLUCOSE BLDC GLUCOMTR-MCNC: 77 MG/DL (ref 70–130)
GLUCOSE BLDC GLUCOMTR-MCNC: 88 MG/DL (ref 70–130)
GLUCOSE BLDC GLUCOMTR-MCNC: 91 MG/DL (ref 70–130)
GLUCOSE BLDC GLUCOMTR-MCNC: 99 MG/DL (ref 70–130)
GLUCOSE SERPL-MCNC: 174 MG/DL (ref 65–99)
HCT VFR BLD AUTO: 30.6 % (ref 34–46.6)
HGB BLD-MCNC: 9.3 G/DL (ref 12–15.9)
IMM GRANULOCYTES # BLD AUTO: 0.04 10*3/MM3 (ref 0–0.05)
IMM GRANULOCYTES NFR BLD AUTO: 0.6 % (ref 0–0.5)
LYMPHOCYTES # BLD AUTO: 0.69 10*3/MM3 (ref 0.7–3.1)
LYMPHOCYTES NFR BLD AUTO: 9.6 % (ref 19.6–45.3)
MCH RBC QN AUTO: 30.9 PG (ref 26.6–33)
MCHC RBC AUTO-ENTMCNC: 30.4 G/DL (ref 31.5–35.7)
MCV RBC AUTO: 101.7 FL (ref 79–97)
MONOCYTES # BLD AUTO: 0.42 10*3/MM3 (ref 0.1–0.9)
MONOCYTES NFR BLD AUTO: 5.8 % (ref 5–12)
NEUTROPHILS NFR BLD AUTO: 5.96 10*3/MM3 (ref 1.7–7)
NEUTROPHILS NFR BLD AUTO: 82.8 % (ref 42.7–76)
NRBC BLD AUTO-RTO: 0 /100 WBC (ref 0–0.2)
PLATELET # BLD AUTO: 172 10*3/MM3 (ref 140–450)
PMV BLD AUTO: 9.2 FL (ref 6–12)
POTASSIUM SERPL-SCNC: 4.3 MMOL/L (ref 3.5–5.2)
QT INTERVAL: 368 MS
QTC INTERVAL: 479 MS
RBC # BLD AUTO: 3.01 10*6/MM3 (ref 3.77–5.28)
SODIUM SERPL-SCNC: 140 MMOL/L (ref 136–145)
VANCOMYCIN SERPL-MCNC: 18.9 MCG/ML (ref 5–40)
WBC NRBC COR # BLD AUTO: 7.2 10*3/MM3 (ref 3.4–10.8)

## 2024-08-07 PROCEDURE — 25010000002 HEPARIN (PORCINE) PER 1000 UNITS: Performed by: INTERNAL MEDICINE

## 2024-08-07 PROCEDURE — 80048 BASIC METABOLIC PNL TOTAL CA: CPT | Performed by: INTERNAL MEDICINE

## 2024-08-07 PROCEDURE — 25010000002 HYDROMORPHONE PER 4 MG: Performed by: INTERNAL MEDICINE

## 2024-08-07 PROCEDURE — 82948 REAGENT STRIP/BLOOD GLUCOSE: CPT

## 2024-08-07 PROCEDURE — 25810000003 SODIUM CHLORIDE 0.9 % SOLUTION

## 2024-08-07 PROCEDURE — 92610 EVALUATE SWALLOWING FUNCTION: CPT | Performed by: SPEECH-LANGUAGE PATHOLOGIST

## 2024-08-07 PROCEDURE — 80202 ASSAY OF VANCOMYCIN: CPT

## 2024-08-07 PROCEDURE — 85025 COMPLETE CBC W/AUTO DIFF WBC: CPT | Performed by: INTERNAL MEDICINE

## 2024-08-07 PROCEDURE — 63710000001 ONDANSETRON ODT 4 MG TABLET DISPERSIBLE: Performed by: INTERNAL MEDICINE

## 2024-08-07 PROCEDURE — 99233 SBSQ HOSP IP/OBS HIGH 50: CPT | Performed by: NURSE PRACTITIONER

## 2024-08-07 PROCEDURE — 99232 SBSQ HOSP IP/OBS MODERATE 35: CPT | Performed by: STUDENT IN AN ORGANIZED HEALTH CARE EDUCATION/TRAINING PROGRAM

## 2024-08-07 PROCEDURE — 63710000001 INSULIN REGULAR HUMAN PER 5 UNITS: Performed by: INTERNAL MEDICINE

## 2024-08-07 PROCEDURE — 25010000002 CEFTRIAXONE PER 250 MG: Performed by: NURSE PRACTITIONER

## 2024-08-07 PROCEDURE — 25010000002 VANCOMYCIN 5 G RECONSTITUTED SOLUTION

## 2024-08-07 PROCEDURE — 25010000002 METRONIDAZOLE 500 MG/100ML SOLUTION: Performed by: INTERNAL MEDICINE

## 2024-08-07 RX ADMIN — HYDROCODONE BITARTRATE AND ACETAMINOPHEN 1 TABLET: 10; 325 TABLET ORAL at 21:58

## 2024-08-07 RX ADMIN — ROPINIROLE HYDROCHLORIDE 0.5 MG: 0.25 TABLET, FILM COATED ORAL at 21:18

## 2024-08-07 RX ADMIN — VANCOMYCIN HYDROCHLORIDE 750 MG: 5 INJECTION, POWDER, LYOPHILIZED, FOR SOLUTION INTRAVENOUS at 10:41

## 2024-08-07 RX ADMIN — HYDROMORPHONE HYDROCHLORIDE 0.5 MG: 1 INJECTION, SOLUTION INTRAMUSCULAR; INTRAVENOUS; SUBCUTANEOUS at 11:22

## 2024-08-07 RX ADMIN — ATORVASTATIN CALCIUM 80 MG: 40 TABLET, FILM COATED ORAL at 21:18

## 2024-08-07 RX ADMIN — Medication 10 ML: at 08:39

## 2024-08-07 RX ADMIN — PANTOPRAZOLE SODIUM 40 MG: 40 TABLET, DELAYED RELEASE ORAL at 08:36

## 2024-08-07 RX ADMIN — METRONIDAZOLE 500 MG: 500 INJECTION, SOLUTION INTRAVENOUS at 10:41

## 2024-08-07 RX ADMIN — POTASSIUM CHLORIDE 20 MEQ: 1500 TABLET, EXTENDED RELEASE ORAL at 21:18

## 2024-08-07 RX ADMIN — Medication 10 ML: at 21:20

## 2024-08-07 RX ADMIN — TRAZODONE HYDROCHLORIDE 50 MG: 50 TABLET ORAL at 21:18

## 2024-08-07 RX ADMIN — LEVOTHYROXINE SODIUM 100 MCG: 0.1 TABLET ORAL at 08:37

## 2024-08-07 RX ADMIN — ONDANSETRON 4 MG: 4 TABLET, ORALLY DISINTEGRATING ORAL at 21:58

## 2024-08-07 RX ADMIN — ROPINIROLE HYDROCHLORIDE 0.5 MG: 0.25 TABLET, FILM COATED ORAL at 08:37

## 2024-08-07 RX ADMIN — CEFTRIAXONE 1000 MG: 1 INJECTION, POWDER, FOR SOLUTION INTRAMUSCULAR; INTRAVENOUS at 21:19

## 2024-08-07 RX ADMIN — FOLIC ACID 1 MG: 1 TABLET ORAL at 08:37

## 2024-08-07 RX ADMIN — ASPIRIN 81 MG: 81 TABLET, COATED ORAL at 08:36

## 2024-08-07 RX ADMIN — LOSARTAN POTASSIUM 50 MG: 50 TABLET, FILM COATED ORAL at 08:36

## 2024-08-07 RX ADMIN — NIFEDIPINE 90 MG: 30 TABLET, FILM COATED, EXTENDED RELEASE ORAL at 08:37

## 2024-08-07 RX ADMIN — HEPARIN SODIUM 5000 UNITS: 5000 INJECTION INTRAVENOUS; SUBCUTANEOUS at 21:19

## 2024-08-07 RX ADMIN — HYDROCODONE BITARTRATE AND ACETAMINOPHEN 1 TABLET: 10; 325 TABLET ORAL at 08:47

## 2024-08-07 RX ADMIN — FERROUS SULFATE TAB 325 MG (65 MG ELEMENTAL FE) 325 MG: 325 (65 FE) TAB at 08:37

## 2024-08-07 RX ADMIN — LACOSAMIDE 50 MG: 50 TABLET, FILM COATED ORAL at 21:18

## 2024-08-07 RX ADMIN — LACOSAMIDE 50 MG: 50 TABLET, FILM COATED ORAL at 08:37

## 2024-08-07 RX ADMIN — METRONIDAZOLE 500 MG: 500 INJECTION, SOLUTION INTRAVENOUS at 18:09

## 2024-08-07 RX ADMIN — INSULIN HUMAN 2 UNITS: 100 INJECTION, SOLUTION PARENTERAL at 00:03

## 2024-08-07 RX ADMIN — FLUOXETINE HYDROCHLORIDE 60 MG: 20 CAPSULE ORAL at 08:37

## 2024-08-07 RX ADMIN — HEPARIN SODIUM 5000 UNITS: 5000 INJECTION INTRAVENOUS; SUBCUTANEOUS at 08:36

## 2024-08-07 RX ADMIN — METRONIDAZOLE 500 MG: 500 INJECTION, SOLUTION INTRAVENOUS at 03:29

## 2024-08-07 NOTE — PROGRESS NOTES
PROGRESS NOTE         Patient Identification:  Name:  Reny Elena  Age:  64 y.o.  Sex:  female  :  1960  MRN:  [id number]  Visit Number:  [id number]  Primary Care Provider:  Susan Stephens APRN         LOS: 4 days       ----------------------------------------------------------------------------------------------------------------------  Subjective       Chief Complaints:    Vomiting        Interval History:      Patient resting in bed this morning.  Reports overall not feeling well today.  No vomiting.  Continued nausea.  Denies diarrhea.  Currently on room air with no apparent distress.  Lungs clear to auscultation bilaterally.  Abdomen soft, nontender.  Explained in depth to patient that without surgery patient would likely require lifelong antibiotic therapy due to hardware, patient reports she is considering surgery now.  WBC normal at 7.20.  Wound culture of the right foot preliminary reports mixed gram-negative maurisio and gram-positive cocci.    Review of Systems:    Constitutional: no fever, chills and night sweats.  Generalized fatigue.  Eyes: no eye drainage, itching or redness.  HEENT: no mouth sores, dysphagia or nose bleed.  Respiratory: no for shortness of breath, cough or production of sputum.  Cardiovascular: no chest pain, no palpitations, no orthopnea.  Gastrointestinal:Positive nausea, No documented vomiting. No diarrhea. No abdominal pain, hematemesis or rectal bleeding.  Genitourinary: peritoneal dialysis. no dysuria or polyuria.  Hematologic/lymphatic: no lymph node abnormalities, no easy bruising or easy bleeding.  Musculoskeletal: no muscle or joint pain.  Skin: R foot wound. No rash and no itching.  Neurological: no loss of consciousness, no seizure, no headache.  Behavioral/Psych: no depression or suicidal ideation.  Endocrine: no hot flashes.  Immunologic:  negative.    ----------------------------------------------------------------------------------------------------------------------      Objective       [facility] Meds:  aspirin, 81 mg, Oral, Daily  atorvastatin, 80 mg, Oral, Nightly  cefTRIAXone, 1,000 mg, Intravenous, Q24H  ferrous sulfate, 325 mg, Oral, Daily With Breakfast  FLUoxetine, 60 mg, Oral, Daily  folic acid, 1 mg, Oral, Daily  heparin (porcine), 5,000 Units, Subcutaneous, Q12H  lacosamide, 50 mg, Oral, Q12H  levothyroxine, 100 mcg, Oral, QAM AC  losartan, 50 mg, Oral, Daily  metroNIDAZOLE, 500 mg, Intravenous, Q8H  NIFEdipine XL, 90 mg, Oral, Daily  pantoprazole, 40 mg, Oral, QAM AC  potassium chloride, 20 mEq, Oral, BID  rOPINIRole, 0.5 mg, Oral, BID  sodium chloride, 10 mL, Intravenous, Q12H  traZODone, 50 mg, Oral, Nightly  vancomycin, 750 mg, Intravenous, Once  Vancomycin Pharmacy Intermittent/Pulse Dosing, , Does not apply, Daily           ----------------------------------------------------------------------------------------------------------------------    Vital Signs:  Temp:  [97.9 °F (36.6 °C)-98.9 °F (37.2 °C)] 98.7 °F (37.1 °C)  Heart Rate:  [] 100  Resp:  [18-20] 20  BP: (115-152)/(63-69) 133/63  No data found.    SpO2 Percentage    08/06/24 1900 08/07/24 0300 08/07/24 0600   SpO2: 97% 99% 99%     SpO2:  [97 %-99 %] 99 %  on   ;   Device (Oxygen Therapy): room air    Body mass index is 18.86 kg/m².  Wt Readings from Last 3 Encounters:   08/07/24 49.9 kg (109 lb 14.4 oz)   07/26/24 45.4 kg (100 lb)   07/26/24 51.7 kg (114 lb)        Intake/Output Summary (Last 24 hours) at 8/7/2024 1052  Last data filed at 8/7/2024 0717  Gross per 24 hour   Intake 1017.91 ml   Output 827 ml   Net 190.91 ml     Diet: Diabetic; Consistent Carbohydrate; Fluid Consistency: Thin (IDDSI 0)  NPO Diet NPO Type: Sips with  Meds  ----------------------------------------------------------------------------------------------------------------------      Physical Exam:    Constitutional: Frail elderly female laying in bed resting comfortably this morning.  She continues to have nausea. Ate some breakfast this morning.  HENT:  Head: Normocephalic and atraumatic.  Mouth:  Moist mucous membranes.    Eyes:  Conjunctivae and EOM are normal.  No scleral icterus.  Neck:  Neck supple.  No JVD present.    Cardiovascular:  Normal rate, regular rhythm and normal heart sounds with no murmur. No edema.  Pulmonary/Chest: On room air without apparent distress.  Lungs are clear to auscultation bilaterally.  No respiratory distress, no wheezes, no crackles, with normal breath sounds and good air movement.  Abdominal:  peritoneal dialysis catheter. Soft.  Bowel sounds are normal.  No distension and positive mild diffuse tenderness.  Musculoskeletal: Right foot wound with dressing in place.  No edema, no tenderness, and no deformity.  No swelling or redness of joints.  Neurological:  Alert and oriented to person, place, and time.  No facial droop.  No slurred speech.   Skin: Right foot wound with dressing in place.  Skin is warm and dry.  No rash noted.  No pallor.   Psychiatric:  Normal mood and affect.  Behavior is normal.        ----------------------------------------------------------------------------------------------------------------------  Results from last 7 days   Lab Units 08/06/24 2031 08/06/24  1817   HSTROP T ng/L 121* 124*           Results from last 7 days   Lab Units 08/07/24  0047 08/06/24  0229 08/05/24  0123 08/04/24  0300 08/04/24  0216 08/03/24  0534 08/02/24  1916   CRP mg/dL  --   --   --   --  1.86* 1.83* 2.25*  2.34*   LACTATE mmol/L  --   --   --   --   --   --  1.1   WBC 10*3/mm3 7.20 10.55 12.26*   < >  --  9.04 9.44   HEMOGLOBIN g/dL 9.3* 9.5* 10.2*   < >  --  10.7* 13.0   HEMATOCRIT % 30.6* 30.1* 35.0   < >  --  34.4 40.5  "  MCV fL 101.7* 99.0* 108.4*   < >  --  98.6* 96.2   MCHC g/dL 30.4* 31.6 29.1*   < >  --  31.1* 32.1   PLATELETS 10*3/mm3 172 178 102*   < >  --  185 251    < > = values in this interval not displayed.     Results from last 7 days   Lab Units 08/07/24  0047 08/06/24  0229 08/05/24  0223 08/04/24  0216 08/03/24  0534   SODIUM mmol/L 140 136 138   < > 137   POTASSIUM mmol/L 4.3 5.1 3.8   < > 4.7   CHLORIDE mmol/L 109* 107 103   < > 100   CO2 mmol/L 19.0* 17.8* 20.6*   < > 23.0   BUN mg/dL 20 21 21   < > 31*   CREATININE mg/dL 3.12* 3.19* 3.19*   < > 4.26*   CALCIUM mg/dL 6.7* 6.6* 6.9*   < > 6.8*   GLUCOSE mg/dL 174* 105* 130*   < > 119*   ALBUMIN g/dL  --  1.9* 2.2*  --  2.3*   BILIRUBIN mg/dL  --  0.2 0.2  --  0.2   ALK PHOS U/L  --  189* 205*  --  232*   AST (SGOT) U/L  --  16 15  --  11   ALT (SGPT) U/L  --  6 9  --  8    < > = values in this interval not displayed.   Estimated Creatinine Clearance: 14 mL/min (A) (by C-G formula based on SCr of 3.12 mg/dL (H)).  No results found for: \"AMMONIA\"    Glucose   Date/Time Value Ref Range Status   08/07/2024 1046 100 70 - 130 mg/dL Final   08/07/2024 0627 77 70 - 130 mg/dL Final   08/07/2024 0536 88 70 - 130 mg/dL Final   08/06/2024 2353 163 (H) 70 - 130 mg/dL Final   08/06/2024 1639 93 70 - 130 mg/dL Final   08/06/2024 1055 89 70 - 130 mg/dL Final   08/06/2024 0636 91 70 - 130 mg/dL Final   08/06/2024 0550 93 70 - 130 mg/dL Final     Lab Results   Component Value Date    HGBA1C 6.10 (H) 08/02/2024     Lab Results   Component Value Date    TSH 17.140 (H) 07/21/2024    FREET4 2.1 (H) 05/17/2024       Blood Culture   Date Value Ref Range Status   08/02/2024 No growth at 24 hours  Preliminary   08/02/2024 No growth at 24 hours  Preliminary     No results found for: \"URINECX\"  No results found for: \"WOUNDCX\"  No results found for: \"STOOLCX\"  No results found for: \"RESPCX\"  Pain Management Panel  More data exists         Latest Ref Rng & Units 7/21/2024 4/28/2024   Pain " Management Panel   Amphetamine, Urine Qual Negative Negative  Negative    Barbiturates Screen, Urine Negative Negative  Negative    Benzodiazepine Screen, Urine Negative Negative  Negative    Buprenorphine, Screen, Urine Negative Negative  Negative    Cocaine Screen, Urine Negative Negative  Negative    Fentanyl, Urine Negative Negative  Negative    Methadone Screen , Urine Negative Negative  Negative    Methamphetamine, Ur Negative Negative  Negative       Details                     ----------------------------------------------------------------------------------------------------------------------  Imaging Results (Last 24 Hours)       ** No results found for the last 24 hours. **            ----------------------------------------------------------------------------------------------------------------------    Pertinent Infectious Disease Results    Thank you Dr. Crawford for allowing us to participate in the care of your patient.  As you well know, Ms. Reny Elena is a 64 y.o. female with past medical history significant for seizure disorder, type 2 diabetes, arthritis, CHF, hypothyroidism, hypertension, hyperlipidemia, GERD, PAD, ESRD, iron deficiency anemia,, who presented to [facility] Emergency Department on 8/2/2024 for nausea, vomiting, diarrhea and worsening pain of right diabetic foot wound.  Patient was recently seen in our ED on 7/21/2024 with complaints of hallucinations, fever, and right foot pain.  At this time, patient was diagnosed with right heel ulcer and UTI.  She was sent home with an antibiotic but was unsure of the name.  Patient reported she started having nausea and vomiting after taking the antibiotics so she stopped taking them.  However, she continued to have nausea and vomiting.       Patient was tachycardic in the ED.Labs show K+ at upper limit of normal, BUN 30, cr 4.36, glucose 176, alk phos 323 and albumin 2.9, otherwise normal LFTs. CRP mildly elevated at 2.34,  while lactate and WBC are normal. UA shows large leuk esterase and unable to determine RBC, WBC, bacteria of squamous cells due to loaded field.  Patient's urine culture from 7/21/2024 grew E. coli.  She had also been following with wound care who it obtained a wound culture on the same day.  This culture finalized with mixed gram-negative maurisio and light growth of Enterococcus faecalis.       X-ray of the right foot performed on 8/2/2024 revealed partial amputation of the fifth metatarsal. Bony demineralization. No acute fracture or dislocation. No lytic or blastic bony lesions seen. Diffuse interphalangeal joint space loss. Mild degenerative changes in the midfoot. No cortical erosion. Fixation timur in the tibia/talus/calcaneus noted. Diffuse soft tissue swelling. Vascular calcifications. No soft tissue gas.  CT abdomen/pelvis revealed peritoneal dialysis catheter noted in the anterior right abdominal wall with the catheter noted to terminate in the left lateral pelvis. Small volume of free fluid in the right upper quadrant and lower posterior pelvis and right lower quadrant. Slightly elevated left hemidiaphragm. No bowel or renal obstruction. No abdominal aortic aneurysm. No features of acute appendicitis. Stranding identified in the presacral space could present changes related to mild distal colitis. Fluid-filled bladder with small volume of air in the bladder lumen. No pneumatosis. No conclusive features of free air. Distended configuration to the gallbladder likely due to fasting. No conclusive features of gastritis or enteritis.       Patient continued to have nausea and vomiting after 2 doses of Zofran.  She was admitted for further treatment.  Subsequently, patient was started on IV Rocephin in the setting of E. coli UTI.  Flagyl was added to cover possible colitis.  Patient was also started on IV vancomycin for Enterococcus faecalis wound infection.  General surgery consulted for possible debridement of  patient's wound.     Assessment/Plan       Assessment     R foot wound infection, Enterococcus faecalis  Possible osteomyelitis of R foot  Hardware present in RLE  E. Coli UTI  Possible colitis         Plan        Patient resting in bed this morning.  Reports overall not feeling well today.  No vomiting.  Continued nausea.  Denies diarrhea.  Currently on room air with no apparent distress.  Lungs clear to auscultation bilaterally.  Abdomen soft, nontender.  Explained in depth to patient that without surgery patient would likely require lifelong antibiotic therapy due to hardware, patient reports she is considering surgery now.  WBC normal at 7.20.  Wound culture of the right foot preliminary reports mixed gram-negative maurisio and gram-positive cocci.    Wound culture from 8/3/2024 preliminary reports growth of mixed gram-negative maurisio.  Urine culture from 8/30/2024 finalized as 25,000 colonies of E. coli.    Patient is currently receiving vancomycin in the setting of Enterococcus faecalis right foot wound infection.  Patient is also receiving ceftriaxone and metronidazole for colitis as well as E. coli UTI and mixed gram-negative maurisio present on most recent right foot wound culture.  Metronidazole course completing today.  Unfortunately patient required lifelong antibiotic therapy if AKA is not performed and removal of hardware as infection will persist due to the present hardware.  We will continue to monitor patient closely and adjust antibiotic coverage as appropriate.      ANTIMICROBIAL THERAPY    cefdinir - 300 MG  cefTRIAXone (ROCEPHIN) 1000 mg IVPB in 100 mL NS (VTB)  metroNIDAZOLE - 500-0.79 MG/100ML-%  vancomycin  Vancomycin Pharmacy Intermittent/Pulse Dosing     Code Status:   Code Status and Medical Interventions: No CPR (Do Not Attempt to Resuscitate); Limited Support; No intubation (DNI)   Ordered at: 08/04/24 3494     Medical Intervention Limits:    No intubation (DNI)     Code Status (Patient has no  pulse and is not breathing):    No CPR (Do Not Attempt to Resuscitate)     Medical Interventions (Patient has pulse or is breathing):    Limited Support       DARIUS Moreno  08/07/24  10:52 EDT

## 2024-08-07 NOTE — CASE MANAGEMENT/SOCIAL WORK
Discharge Planning Assessment  [facility]     Patient Name: Reny Elena  MRN: [id number]  Today's Date: 8/7/2024    Admit Date: 8/2/2024    Plan: Pt was admitted on 8/2/24. Pt lives with significant other. Pt is current with [facility]. Per Geraldine, pt is current with their services and receives skilled nursing. Per Geraldine, pt will require resumption orders faxed at discharge but will need a new face to face if pt has new orders for home health. Pt has wheelchair, bedside commode, glucometer, bp cuff, shower chair, rollator, cane, standard walker via [facility]. Pt requests Hospital bed at discharge. Pt plans to return home at discrge with family to provide transportation. SS to follow.       Discharge Plan       Row Name 08/07/24 1652       Plan    Plan Pt was admitted on 8/2/24. Pt lives with significant other. Pt is current with [facility]. Per Geraldine, pt is current with their services and receives skilled nursing. Per Geraldine, pt will require resumption orders faxed at discharge but will need a new face to face if pt has new orders for home health. Pt has wheelchair, bedside commode, glucometer, bp cuff, shower chair, rollator, cane, standard walker via [facility]. Pt requests Hospital bed at discharge. Pt plans to return home at discrge with family to provide transportation. SS to follow.          Expected Discharge Date and Time       Expected Discharge Date Expected Discharge Time    Aug 8, 2024           CAITY Julian

## 2024-08-07 NOTE — THERAPY EVALUATION
Acute Care - Speech Language Pathology   Swallow Initial Evaluation  [facility]  Clinical Dysphagia Assessment     Patient Name: Reny Elena  : 1960  MRN: [id number]  Today's Date: 2024             Admit Date: 2024    Visit Dx:     ICD-10-CM ICD-9-CM   1. Diabetic ulcer of right heel associated with type 2 diabetes mellitus, with bone involvement without evidence of necrosis  E11.621 250.80    L97.416 707.14   2. Peritoneal dialysis finding  Z99.2 V45.11   3. Nausea and vomiting, unspecified vomiting type  R11.2 787.01   4. Acute cystitis with hematuria  N30.01 595.0   5. Ulcer of right foot, unspecified ulcer stage  L97.519 707.15     Patient Active Problem List   Diagnosis    Tibia/fibula fracture    Iron deficiency anemia    Type 2 diabetes mellitus with hyperglycemia, with long-term current use of insulin    Wound of right ankle    Cellulitis    Metabolic encephalopathy    History of non-ST elevation myocardial infarction (NSTEMI)    HTN (hypertension)    CVA (cerebral vascular accident)    Chronic diastolic CHF (congestive heart failure)    Decubitus ulcer of coccyx, unspecified pressure ulcer stage    Depression    Expressive aphasia    Impaired mobility and ADLs    Hyperkalemia    Subdural hematoma    Severe malnutrition    Cerebrovascular accident (CVA), unspecified mechanism    PRES (posterior reversible encephalopathy syndrome)    Debility    Stage 5 chronic kidney disease on chronic dialysis    Seizure-like activity    Seizure    Open wound    Pressure injury of right heel, stage 3    Pressure injury of sacral region, stage 2    Pressure injury of right heel, unstageable    Diabetic ulcer of right heel     Past Medical History:   Diagnosis Date    Arthritis     Closed fracture of right fibula and tibia 2018    Depression     Diabetic ulcer of left foot associated with type 2 diabetes mellitus 2017    Diastolic dysfunction     Disease of thyroid gland     Elevated cholesterol   "   End stage renal disease     Essential hypertension     GERD (gastroesophageal reflux disease)     History of transfusion     Hyperlipidemia     Iron deficiency anemia 2018    PAD (peripheral artery disease)     Renal failure     Type 2 diabetes mellitus with hyperglycemia, with long-term current use of insulin 2018     Past Surgical History:   Procedure Laterality Date    ABDOMINAL SURGERY      BACK SURGERY      Post spinal diskectomy, osteophytectomy in one lumbar interspace    CATARACT EXTRACTION      Left      SECTION      DENTAL PROCEDURE      ENDOSCOPY      FRACTURE SURGERY      right leg    HYSTERECTOMY      INCISION AND DRAINAGE LEG Left 4/15/2017    Procedure: INCISION AND DRAINAGE LOWER EXTREMITY;  Surgeon: Basilio Morris MD;  Location: [facility] OR;  Service:     INCISION AND DRAINAGE LEG Left 2017    Procedure: Irrigation and Debridment abcess diabetic wound left foot with deep culture;  Surgeon: Basilio Morris MD;  Location: [facility] OR;  Service:     INSERTION PERITONEAL DIALYSIS CATHETER N/A 2021    Procedure: INSERTION PERITONEAL DIALYSIS CATHETER LAPAROSCOPIC;  Surgeon: Eyd Glover MD;  Location: [facility] OR;  Service: General;  Laterality: N/A;    KNEE ARTHROSCOPY Right     ORIF TIBIA FRACTURE Right 2018    Procedure: TIBIAL  FRACTURE OPEN REDUCTION INTERNAL FIXATION;  Surgeon: Jung Barragan MD;  Location: [facility] OR;  Service: Orthopedics    TOE AMPUTATION Right     5th    TUBAL ABDOMINAL LIGATION       Reny Elena  was seen at bedside this PM on 3N Rm [id number] to assess safety/efficacy of swallowing fnx, determine safest/least restrictive diet tolerance.     Per report: \"Reny Elena is a 64 y.o. female admitted on 2024 secondary to Diabetic ulcer of right heel.\"...\"Concern for exposed bone with probable osteomyelitis. No definitive abscess on CT. Wound culture from  revealing Enterococcus faecalis and mixed gram negative maurisio with repeat " "culture revealing mixed gram negative maurisio. Currently on broad spectrum IV antibiotics including Vanc, Rocephin and Flagyl. Surgery consulted and in in setting of pt's nonambulatory status and hardware in RLE, is recommending to proceed with right AKA. Surgery planned for 8/5 but canceled as pt deferred. Surgery discussing possible tentative date for surgery. ID and surgery input appreciated. \"    Social History     Socioeconomic History    Marital status:    Tobacco Use    Smoking status: Never     Passive exposure: Never    Smokeless tobacco: Never   Vaping Use    Vaping status: Never Used   Substance and Sexual Activity    Alcohol use: No    Drug use: No    Sexual activity: Defer     Partners: Male        Imaging:  N/A    Labs:  Sodium  140  08/07 0047  Potassium  4.3  08/07 0047  Chloride  109  08/07 0047  CO2  19.0  08/07 0047  BUN  20  08/07 0047  Creatinine  3.12  08/07 0047  Glucose  100  08/07 1046  Hemoglobin  9.3  08/07 0047  Hematocrit  30.6  08/07 0047  WBC  7.20  08/07 0047  Platelets  172  08/07 0047     Diet Orders (active) (From admission, onward)       Start     Ordered    08/09/24 0001  NPO Diet NPO Type: Sips with Meds  Diet Effective Midnight         08/06/24 1413    08/07/24 1600  Dietary Nutrition Supplements Novasource Renal (Nepro); strawberry  3 Times Daily       08/07/24 0844    08/07/24 1220  Diet: Diabetic; Consistent Carbohydrate; Texture: Soft to Chew (NDD 3); Soft to Chew: Ground Meat; Fluid Consistency: Thin (IDDSI 0)  Diet Effective Now         08/07/24 1220    08/05/24 1600  Dietary Nutrition Supplements Novasource Renal (Nepro)  3 Times Daily       08/05/24 0936                  Pt is observed on RA.     Patient was positioned upright and centered in bed to accept multiple po presentations of ice chips, solid cracker, puree, and thin liquids via spoon, cup, and straw. SLP assists w/ po presentations.      Facial/oral structures were symmetrical upon observation. Lingual " protrusion and OROM/INNA unable to be fully assessed d/t pt lethargy, difficulty maintaining a/a status.  Oral mucosa were moist, pink, and clean. Secretions were clear, thin, and well controlled. Gag is not assessed. Volitional cough was not elicited. Voice was weak in intensity w/ limited vocalizations. Pt mainly utilizes head nods for yes/no questions during evaluation.    Upon po presentations, adequate bolus anticipation and acceptance w/ good labial seal for bolus clearance via spoon bowl, cup rim stability and suction via straw. Bolus formation, manipulation and control were moderately weak w/ oral holding, delay in a-p transit, mild oral residue post swallow with solids. No overt s/s aspiration before the swallow.      Pharyngeal swallow was delayed w/ weak hyolaryngeal elevation per palpation. No overt s/s aspiration evidenced across this evaluation. No silent aspiration suspected. Patient denied odynophagia.    Impression: Patient presented w/ mild oral phase dysphagia characterized by overall weakness and lethargy. Pt with wfl pharyngeal swallow w/o s/s aspiration. No s/s indicative of silent aspiration. Pt will most benefit from a modified po diet of Mechanical soft consistencies w/ ground meats, thin liquids.      SLP Recommendation and Plan   1. Mechanical soft/ground meat consistencies, thin liquids.   2. Medications crushed in thins.   3. Upright and centered for all po intake.  4. FARZANEH precautions.  5. Oral care protocol.    SLP to follow up for diet tolerance/safety.    D/w patient results and recommendations w/ verbal agreement.    D/w RN results and recommendations w/ verbal agreement.    Thank you for allowing me to participate in the care of your patient-  Cherie Gtz M.A., CCC-SLP     EDUCATION  The patient has been educated in the following areas:   Dysphagia (Swallowing Impairment) Oral Care/Hydration Modified Diet Instruction.      Time Calculation:    Time Calculation- SLP       Row  Name 08/07/24 1319             Time Calculation- SLP    SLP - Next Appointment 08/08/24  -                User Key  (r) = Recorded By, (t) = Taken By, (c) = Cosigned By      Initials Name Provider Type     Cherie Gtz MA,CCC-SLP Speech and Language Pathologist                  Therapy Charges for Today       Code Description Service Date Service Provider Modifiers Qty    [id number] HC ST EVAL ORAL PHARYNG SWALLOW 4 8/7/2024 Cherie Gtz MA,CCC-SLP GN 1          Cherie Gtz MA,SUSANA-SLP  8/7/2024

## 2024-08-07 NOTE — PROGRESS NOTES
"Nephrology Progress Note      Subjective     Patient stated she again feels sick to her stomach and not been able to eat and drink again.  No chest pain    Objective       Vital signs :     Temp:  [97.9 °F (36.6 °C)-98.9 °F (37.2 °C)] 98.7 °F (37.1 °C)  Heart Rate:  [] 100  Resp:  [18-20] 20  BP: (115-152)/(63-69) 133/63    Intake/Output                               08/05/24 0701 - 08/06/24 0700 08/06/24 0701 - 08/07/24 0700 08/07/24 0701 - 08/08/24 0700     4251-4677 8013-1386 Total 5515-0290 6996-7382 Total 0409-4671 4670-7422 Total                    Intake    P.O.  --  240 240  240  480 720  --  -- --    I.V.  34  -- 34  --  297.9 297.9  --  -- --    Total Intake 34 240 274 240 777.9 1017.9 -- -- --       Output    Urine  --  850 850  --  -- --  --  -- --    Dialysis  82  -- 82  --  -- --  827  -- 827    Total Output 82 850 932 -- -- -- 827 -- 827             Physical Exam:    General Appearance : alert, pleasant, appears stated age, cooperative and oriented  Lungs : clear to auscultation, respirations regular  Heart :  regular rhythm & normal rate, normal S1, S2 and no murmur, no rub  Abdomen : Soft, nondistended  Extremities : No edema,   Neurologic :   orientated to person, place, time and situation, Grossly no focal deficits        Laboratory Data :     Albumin Albumin   Date Value Ref Range Status   08/06/2024 1.9 (L) 3.5 - 5.2 g/dL Final   08/05/2024 2.2 (L) 3.5 - 5.2 g/dL Final      Magnesium No results found for: \"MG\"       PTH               No results found for: \"PTH\"    CBC and coagulation:  Results from last 7 days   Lab Units 08/07/24  0047 08/06/24  0229 08/05/24  0123 08/04/24  0300 08/04/24  0216 08/03/24  0534 08/02/24  1916   LACTATE mmol/L  --   --   --   --   --   --  1.1   CRP mg/dL  --   --   --   --  1.86* 1.83* 2.25*  2.34*   WBC 10*3/mm3 7.20 10.55 12.26*   < >  --  9.04 9.44   HEMOGLOBIN g/dL 9.3* 9.5* 10.2*   < >  --  10.7* 13.0   HEMATOCRIT % 30.6* 30.1* 35.0   < >  --  34.4 " 40.5   MCV fL 101.7* 99.0* 108.4*   < >  --  98.6* 96.2   MCHC g/dL 30.4* 31.6 29.1*   < >  --  31.1* 32.1   PLATELETS 10*3/mm3 172 178 102*   < >  --  185 251    < > = values in this interval not displayed.     Acid/base balance:      Renal and electrolytes:    Results from last 7 days   Lab Units 08/07/24  0047 08/06/24 0229 08/05/24 0223 08/04/24 0216 08/03/24  0534   SODIUM mmol/L 140 136 138 137 137   POTASSIUM mmol/L 4.3 5.1 3.8 4.2 4.7   CHLORIDE mmol/L 109* 107 103 104 100   CO2 mmol/L 19.0* 17.8* 20.6* 19.1* 23.0   BUN mg/dL 20 21 21 27* 31*   CREATININE mg/dL 3.12* 3.19* 3.19* 3.80* 4.26*   CALCIUM mg/dL 6.7* 6.6* 6.9* 6.4* 6.8*     Estimated Creatinine Clearance: 14 mL/min (A) (by C-G formula based on SCr of 3.12 mg/dL (H)).  @GFRCG:3@   Liver and pancreatic function:  Results from last 7 days   Lab Units 08/06/24 0229 08/05/24 0223 08/03/24  0534 08/02/24  1916   ALBUMIN g/dL 1.9* 2.2* 2.3* 2.9*   BILIRUBIN mg/dL 0.2 0.2 0.2 0.4   ALK PHOS U/L 189* 205* 232* 323*   AST (SGOT) U/L 16 15 11 18   ALT (SGPT) U/L 6 9 8 11   LIPASE U/L  --   --   --  25         Cardiac:      Liver and pancreatic function:  Results from last 7 days   Lab Units 08/06/24 0229 08/05/24 0223 08/03/24  0534 08/02/24  1916   ALBUMIN g/dL 1.9* 2.2* 2.3* 2.9*   BILIRUBIN mg/dL 0.2 0.2 0.2 0.4   ALK PHOS U/L 189* 205* 232* 323*   AST (SGOT) U/L 16 15 11 18   ALT (SGPT) U/L 6 9 8 11   LIPASE U/L  --   --   --  25       Medications :     aspirin, 81 mg, Oral, Daily  atorvastatin, 80 mg, Oral, Nightly  cefTRIAXone, 1,000 mg, Intravenous, Q24H  ferrous sulfate, 325 mg, Oral, Daily With Breakfast  FLUoxetine, 60 mg, Oral, Daily  folic acid, 1 mg, Oral, Daily  heparin (porcine), 5,000 Units, Subcutaneous, Q12H  lacosamide, 50 mg, Oral, Q12H  levothyroxine, 100 mcg, Oral, QAM AC  losartan, 50 mg, Oral, Daily  metroNIDAZOLE, 500 mg, Intravenous, Q8H  NIFEdipine XL, 90 mg, Oral, Daily  pantoprazole, 40 mg, Oral, QAM AC  potassium  chloride, 20 mEq, Oral, BID  rOPINIRole, 0.5 mg, Oral, BID  sodium chloride, 10 mL, Intravenous, Q12H  traZODone, 50 mg, Oral, Nightly  vancomycin, 750 mg, Intravenous, Once  Vancomycin Pharmacy Intermittent/Pulse Dosing, , Does not apply, Daily               Assessment & Plan     -End-stage renal disease on peritoneal dialysis  - Type 2 diabetes mellitus with renal involvement  - Persistent nausea and vomiting  - Cachexia and malnutrition    Evaluated.  Data, patient had about 880 mL of ultrafiltration with peritoneal dialysis last night.  Patient is significantly volume depleted clinically, given the fact she has poor oral intake we will hold peritoneal dialysis tonight.    Significant protein calorie malnutrition, educated and counseled the patient and to continue on Nepro 3 times a day     Nicholas Arroyo MD  08/07/24  09:08 EDT

## 2024-08-07 NOTE — PROGRESS NOTES
Pharmacokinetics Service Note:  Patient is on day 5 of vancomycin therapy for a right foot wound infection. Random vancomycin level was reported as 18.9 mcg/mL on 8/7. Based on this level and the patient receiving peritoneal dialysis last night, the patient was given a one time dose of vancomycin 750mg IV today. Will continue to monitor and recheck a level when appropriate.       Thank you,  Kirk Thomas, PharmD  Pharmacy Resident  08/07/24  13:49 EDT

## 2024-08-07 NOTE — PLAN OF CARE
Goal Outcome Evaluation:  Plan of Care Reviewed With: patient           Outcome Evaluation: Sandra currently resting in bed. VSS on room air, no acute changes noted at this time. Wound care completed per orders. Sandra voices no complaints or concerns at this time, maintain plan of care.

## 2024-08-08 ENCOUNTER — ANESTHESIA EVENT (OUTPATIENT)
Dept: PERIOP | Facility: HOSPITAL | Age: 64
End: 2024-08-08
Payer: MEDICARE

## 2024-08-08 LAB
BACTERIA SPEC AEROBE CULT: ABNORMAL
BACTERIA SPEC AEROBE CULT: ABNORMAL
GLUCOSE BLDC GLUCOMTR-MCNC: 123 MG/DL (ref 70–130)
GLUCOSE BLDC GLUCOMTR-MCNC: 85 MG/DL (ref 70–130)
GLUCOSE BLDC GLUCOMTR-MCNC: 86 MG/DL (ref 70–130)
GLUCOSE BLDC GLUCOMTR-MCNC: 86 MG/DL (ref 70–130)
GLUCOSE BLDC GLUCOMTR-MCNC: 90 MG/DL (ref 70–130)
GRAM STN SPEC: ABNORMAL

## 2024-08-08 PROCEDURE — 25010000002 HEPARIN (PORCINE) PER 1000 UNITS: Performed by: INTERNAL MEDICINE

## 2024-08-08 PROCEDURE — 25010000002 HYDROMORPHONE PER 4 MG: Performed by: INTERNAL MEDICINE

## 2024-08-08 PROCEDURE — 99232 SBSQ HOSP IP/OBS MODERATE 35: CPT | Performed by: STUDENT IN AN ORGANIZED HEALTH CARE EDUCATION/TRAINING PROGRAM

## 2024-08-08 PROCEDURE — 99232 SBSQ HOSP IP/OBS MODERATE 35: CPT | Performed by: NURSE PRACTITIONER

## 2024-08-08 PROCEDURE — 25010000002 CEFTRIAXONE PER 250 MG: Performed by: NURSE PRACTITIONER

## 2024-08-08 PROCEDURE — 82948 REAGENT STRIP/BLOOD GLUCOSE: CPT

## 2024-08-08 RX ORDER — PROCHLORPERAZINE EDISYLATE 5 MG/ML
5 INJECTION INTRAMUSCULAR; INTRAVENOUS EVERY 6 HOURS PRN
Status: DISCONTINUED | OUTPATIENT
Start: 2024-08-08 | End: 2024-08-11

## 2024-08-08 RX ADMIN — TRAZODONE HYDROCHLORIDE 50 MG: 50 TABLET ORAL at 22:07

## 2024-08-08 RX ADMIN — LACOSAMIDE 50 MG: 50 TABLET, FILM COATED ORAL at 22:07

## 2024-08-08 RX ADMIN — CEFTRIAXONE 1000 MG: 1 INJECTION, POWDER, FOR SOLUTION INTRAMUSCULAR; INTRAVENOUS at 22:06

## 2024-08-08 RX ADMIN — ASPIRIN 81 MG: 81 TABLET, COATED ORAL at 08:37

## 2024-08-08 RX ADMIN — HYDROCODONE BITARTRATE AND ACETAMINOPHEN 1 TABLET: 10; 325 TABLET ORAL at 10:13

## 2024-08-08 RX ADMIN — LOSARTAN POTASSIUM 50 MG: 50 TABLET, FILM COATED ORAL at 08:37

## 2024-08-08 RX ADMIN — LACOSAMIDE 50 MG: 50 TABLET, FILM COATED ORAL at 08:37

## 2024-08-08 RX ADMIN — ROPINIROLE HYDROCHLORIDE 0.5 MG: 0.25 TABLET, FILM COATED ORAL at 22:07

## 2024-08-08 RX ADMIN — Medication 10 ML: at 20:13

## 2024-08-08 RX ADMIN — FERROUS SULFATE TAB 325 MG (65 MG ELEMENTAL FE) 325 MG: 325 (65 FE) TAB at 08:37

## 2024-08-08 RX ADMIN — NIFEDIPINE 90 MG: 30 TABLET, FILM COATED, EXTENDED RELEASE ORAL at 08:38

## 2024-08-08 RX ADMIN — ATORVASTATIN CALCIUM 80 MG: 40 TABLET, FILM COATED ORAL at 22:07

## 2024-08-08 RX ADMIN — FLUOXETINE HYDROCHLORIDE 60 MG: 20 CAPSULE ORAL at 08:37

## 2024-08-08 RX ADMIN — Medication 10 ML: at 08:39

## 2024-08-08 RX ADMIN — LEVOTHYROXINE SODIUM 100 MCG: 0.1 TABLET ORAL at 08:37

## 2024-08-08 RX ADMIN — ROPINIROLE HYDROCHLORIDE 0.5 MG: 0.25 TABLET, FILM COATED ORAL at 08:38

## 2024-08-08 RX ADMIN — HYDROMORPHONE HYDROCHLORIDE 0.5 MG: 1 INJECTION, SOLUTION INTRAMUSCULAR; INTRAVENOUS; SUBCUTANEOUS at 20:13

## 2024-08-08 RX ADMIN — HEPARIN SODIUM 5000 UNITS: 5000 INJECTION INTRAVENOUS; SUBCUTANEOUS at 22:08

## 2024-08-08 RX ADMIN — PANTOPRAZOLE SODIUM 40 MG: 40 TABLET, DELAYED RELEASE ORAL at 08:37

## 2024-08-08 RX ADMIN — HEPARIN SODIUM 5000 UNITS: 5000 INJECTION INTRAVENOUS; SUBCUTANEOUS at 08:39

## 2024-08-08 RX ADMIN — FOLIC ACID 1 MG: 1 TABLET ORAL at 08:36

## 2024-08-08 NOTE — SIGNIFICANT NOTE
08/08/24 1203   OTHER   Discipline speech language pathologist   Rehab Time/Intention   Session Not Performed patient/family declined evaluation  (Pt seen at bedside for attempted clinical dysphagia reassessment/diet acceptance. She declined to accept all po presentations at this time. SLP to continue to f/u for diet safety/acceptance.)   Recommendations   SLP - Next Appointment 08/09/24

## 2024-08-08 NOTE — PROGRESS NOTES
[facility]     Patient Identification:  Name:  Reny Elena  Age:  64 y.o.  Sex:  female  :  1960  MRN:  [id number]  Visit Number:  [id number]  ROOM: [address] [place] [address]     Primary Care Provider:  Susan Stephens APRN     Date of Admission: 2024    Length of stay in inpatient status:  4    Subjective     Chief Compliant:    Chief Complaint   Patient presents with    Vomiting       Patient appears comfortable today and agreeable for OR Friday w/o reservation expressed this am. Relative hypoglycemia this am and mild nausea reported        Objective       Vital Signs:  Temp:  [98.4 °F (36.9 °C)-98.9 °F (37.2 °C)] 98.4 °F (36.9 °C)  Heart Rate:  [] 98  Resp:  [19-20] 19  BP: (115-168)/(63-88) 142/72  SpO2:  [97 %-99 %] 98 %  on   ;   Device (Oxygen Therapy): room air  Body mass index is 18.86 kg/m².      ----------------------------------------------------------------------------------------------------------------------  Physical Exam  Vitals and nursing note reviewed.   Constitutional:       General: She is not in acute distress.  HENT:      Head: Normocephalic and atraumatic.   Eyes:      General: No scleral icterus.     Extraocular Movements: Extraocular movements intact.   Cardiovascular:      Rate and Rhythm: Normal rate.   Pulmonary:      Effort: Pulmonary effort is normal. No respiratory distress.   Abdominal:      General: There is no distension.      Palpations: Abdomen is soft.   Musculoskeletal:      Comments: R heel with clean dressing overlying   Neurological:      Mental Status: She is alert. Mental status is at baseline.      Cranial Nerves: No cranial nerve deficit.   Psychiatric:         Mood and Affect: Mood normal.         Behavior: Behavior normal.       ----------------------------------------------------------------------------------------------------------------------          Assessment & Plan      Right heel ulcer: Concern for exposed bone  with probable osteomyelitis. No definitive abscess on CT. Wound culture from 7/26 revealing Enterococcus faecalis and mixed gram negative maurisio with repeat culture revealing mixed gram negative maurisio. Currently on broad spectrum IV antibiotics including Vanc, Rocephin and Flagyl. Surgery consulted and in in setting of pt's nonambulatory status and hardware in RLE, is recommending to proceed with right AKA. Surgery planned for 8/5 but canceled as pt deferred. Surgery discussing possible tentative date for surgery. ID and surgery input appreciated. To OR Friday so long as patient remains agreeable       UTI: Recent culture from 7/21 revealed E coli with repeat culture revealing low colony count of E coli. Cont Rocephin.      Possible colitis: Pt reports recent N/V, generalized abd pain and decreased PO intake. CT abd/pelvis reveals findings possibly representing mild distal colitis. Cont Rocephin and Flagyl. Supportive treatment.      ESRD on PD: Concern for dehydration in the setting of above. IVF's initiated on admission per nephrology. Nephrology planning for PD tonight. Nephrology input appreciated.      DM II, insulin dependent: Episodes of hypoglycemia intermittently. Cont hypoglycemia protocol and SSI with Accuchecks. Stopped SSI given mild hyperglycemia and significant sensitivity w/sx reported this am     Severe malnutrition: Supportive treatment.      Anxiety and depression: Psychiatry consulted with recs to increase Prozac. Supportive treatment.       Code Status and Medical Interventions: No CPR (Do Not Attempt to Resuscitate); Limited Support; No intubation (DNI)   Ordered at: 08/04/24 0813     Medical Intervention Limits:    No intubation (DNI)     Code Status (Patient has no pulse and is not breathing):    No CPR (Do Not Attempt to Resuscitate)     Medical Interventions (Patient has pulse or is breathing):    Limited Support         Disposition: OR Friday for definitive management    I have reviewed any  copied/forwarded text or data, verified its accuracy, and updated as necessary above.    Buddy Garcia MD  [facility]  08/07/24  21:21 EDT

## 2024-08-08 NOTE — PROGRESS NOTES
PROGRESS NOTE         Patient Identification:  Name:  Reny Elena  Age:  64 y.o.  Sex:  female  :  1960  MRN:  [id number]  Visit Number:  [id number]  Primary Care Provider:  Susan Stephens APRN         LOS: 5 days       ----------------------------------------------------------------------------------------------------------------------  Subjective       Chief Complaints:    Vomiting        Interval History:      The patient is awake and alert, resting comfortably in bed.  Primary RN at the bedside.  The patient does complain of foot pain and nausea this morning.  On room air with no apparent distress.  Afebrile.  Denies diarrhea.  Lung exam is clear with diminished bases bilaterally.  Abdomen is soft and nontender with normal active bowel sounds.  The right foot is dressed with Mepilex dressings, clean dry and intact.  Right foot wound culture is finalized this morning with moderate growth mixed gram-negative maurisio and moderate growth Enterococcus faecalis.    Review of Systems:    Constitutional: no fever, chills and night sweats.  Generalized fatigue.  Eyes: no eye drainage, itching or redness.  HEENT: no mouth sores, dysphagia or nose bleed.  Respiratory: no for shortness of breath, cough or production of sputum.  Cardiovascular: no chest pain, no palpitations, no orthopnea.  Gastrointestinal:Positive nausea, No documented vomiting. No diarrhea. No abdominal pain, hematemesis or rectal bleeding.  Genitourinary: peritoneal dialysis. no dysuria or polyuria.  Hematologic/lymphatic: no lymph node abnormalities, no easy bruising or easy bleeding.  Musculoskeletal: no muscle or joint pain.  Skin: R foot wound. No rash and no itching.  Neurological: no loss of consciousness, no seizure, no headache.  Behavioral/Psych: no depression or suicidal ideation.  Endocrine: no hot flashes.  Immunologic:  Daily Note     Today's date: 3/5/2020  Patient name: Germania Luque  : 1976  MRN: [id number]  Referring provider: Tamie Ashley MD  Dx:   Encounter Diagnosis     ICD-10-CM    1  Rupture of left distal biceps tendon, subsequent encounter [id number]                   Subjective: My goal RTW is 4/15    Objective: See treatment diary below      Assessment: Tolerated treatment well  Patient exhibited good technique with therapeutic exercises  Tolerated upgrades well  Continue to upgrade as tolerated  Plan: Continue per plan of care  Precautions:  Distal bicep repair 20  Follow protocol                 Manual    2/4   Dressing change   DC       Edema massage   5'       Scar massage  8'  5' 5' + elastomer mold 8'  8'                                     Exercise Diary  2/11 2/17 2/24 3/3  2   PT ed  remove splint for AAROM ex for elbow ext DC splint   No lifting > 1 lb with elbow    POC  Remove splint 3x/day to perform full AROM to elbow     HEP          Fist pumps x10     x30   Wrist circles x10     x30   A/AAROM foream  5' x20 1x20   5'   A/AAROM elbow  10' in supine  x 30 30   full 5'    Cones for p/s  3 sets  3 sets  3 sets    3 sets   Wrist maze  x20  x20  20x    x20    Cones on clothespin pole for elbow ext  3 sets    3 sets blue CP        Wrist curls e/f, dt    2# x 20 ea  2# 3x10  3# 3x10      Bicep curl    1# x 30  1# 3x10  3# 3x10      Tricep    1# x 30  1# 3x10  3# 3x10      Hand gripper    25# x 30  25# 30x  30# 30x      UBE      8' L1 0  8' L 2 5      Body Blade       1x 2 min Elbow 90 Abduction/flexoin                                                                                 Modalities        MHP   10' pre tx   10' pre tx                               negative.    ----------------------------------------------------------------------------------------------------------------------      Objective       Current [facility] Meds:  aspirin, 81 mg, Oral, Daily  atorvastatin, 80 mg, Oral, Nightly  cefTRIAXone, 1,000 mg, Intravenous, Q24H  ferrous sulfate, 325 mg, Oral, Daily With Breakfast  FLUoxetine, 60 mg, Oral, Daily  folic acid, 1 mg, Oral, Daily  heparin (porcine), 5,000 Units, Subcutaneous, Q12H  lacosamide, 50 mg, Oral, Q12H  levothyroxine, 100 mcg, Oral, QAM AC  losartan, 50 mg, Oral, Daily  NIFEdipine XL, 90 mg, Oral, Daily  pantoprazole, 40 mg, Oral, QAM AC  rOPINIRole, 0.5 mg, Oral, BID  sodium chloride, 10 mL, Intravenous, Q12H  traZODone, 50 mg, Oral, Nightly  Vancomycin Pharmacy Intermittent/Pulse Dosing, , Does not apply, Daily           ----------------------------------------------------------------------------------------------------------------------    Vital Signs:  Temp:  [98.4 °F (36.9 °C)-98.7 °F (37.1 °C)] 98.4 °F (36.9 °C)  Heart Rate:  [] 99  Resp:  [18-20] 18  BP: (131-168)/(59-88) 157/71  Mean Arterial Pressure (Non-Invasive) for the past 24 hrs (Last 3 readings):   Noninvasive MAP (mmHg)   08/08/24 0227 96   08/07/24 1845 117   08/07/24 1122 125       SpO2 Percentage    08/07/24 1400 08/07/24 1845 08/08/24 0227   SpO2: 97% 98% 97%     SpO2:  [97 %-98 %] 97 %  on   ;   Device (Oxygen Therapy): room air    Body mass index is 17.15 kg/m².  Wt Readings from Last 3 Encounters:   08/08/24 45.3 kg (99 lb 14.4 oz)   07/26/24 45.4 kg (100 lb)   07/26/24 51.7 kg (114 lb)        Intake/Output Summary (Last 24 hours) at 8/8/2024 0946  Last data filed at 8/8/2024 0227  Gross per 24 hour   Intake 830 ml   Output --   Net 830 ml     NPO Diet NPO Type: Sips with Meds  Diet: Diabetic; Consistent Carbohydrate; Texture: Soft to Chew (NDD 3); Soft to Chew: Ground Meat; Fluid Consistency: Thin (IDDSI  0)  ----------------------------------------------------------------------------------------------------------------------      Physical Exam:    Constitutional: Frail elderly female sitting up comfortably in bed.  Continues to complain of nausea.  Complains of foot pain.  Primary RN at the bedside.  HENT:  Head: Normocephalic and atraumatic.  Mouth:  Moist mucous membranes.    Eyes:  Conjunctivae and EOM are normal.  No scleral icterus.  Neck:  Neck supple.  No JVD present.    Cardiovascular:  Normal rate, regular rhythm and normal heart sounds with no murmur. No edema.  Pulmonary/Chest: Clear with diminished bases bilaterally no respiratory distress, no wheezes, no crackles, with normal breath sounds and good air movement.  Abdominal:  peritoneal dialysis catheter. Soft.  Bowel sounds are normal.  No distension and positive mild diffuse tenderness.  Musculoskeletal: Right foot wound with dressing in place.  No edema, no tenderness, and no deformity.  No swelling or redness of joints.  Neurological:  Alert and oriented to person, place, and time.  No facial droop.  No slurred speech.   Skin: Right foot wound with dressing in place.  Skin is warm and dry.  No rash noted.  No pallor.   Psychiatric:  Normal mood and affect.  Behavior is normal.        ----------------------------------------------------------------------------------------------------------------------  Results from last 7 days   Lab Units 08/06/24 2031 08/06/24  1817   HSTROP T ng/L 121* 124*           Results from last 7 days   Lab Units 08/07/24  0047 08/06/24  0229 08/05/24  0123 08/04/24  0300 08/04/24  0216 08/03/24  0534 08/02/24  1916   CRP mg/dL  --   --   --   --  1.86* 1.83* 2.25*  2.34*   LACTATE mmol/L  --   --   --   --   --   --  1.1   WBC 10*3/mm3 7.20 10.55 12.26*   < >  --  9.04 9.44   HEMOGLOBIN g/dL 9.3* 9.5* 10.2*   < >  --  10.7* 13.0   HEMATOCRIT % 30.6* 30.1* 35.0   < >  --  34.4 40.5   MCV fL 101.7* 99.0* 108.4*   < >  --   "98.6* 96.2   MCHC g/dL 30.4* 31.6 29.1*   < >  --  31.1* 32.1   PLATELETS 10*3/mm3 172 178 102*   < >  --  185 251    < > = values in this interval not displayed.     Results from last 7 days   Lab Units 08/07/24  0047 08/06/24  0229 08/05/24  0223 08/04/24  0216 08/03/24  0534   SODIUM mmol/L 140 136 138   < > 137   POTASSIUM mmol/L 4.3 5.1 3.8   < > 4.7   CHLORIDE mmol/L 109* 107 103   < > 100   CO2 mmol/L 19.0* 17.8* 20.6*   < > 23.0   BUN mg/dL 20 21 21   < > 31*   CREATININE mg/dL 3.12* 3.19* 3.19*   < > 4.26*   CALCIUM mg/dL 6.7* 6.6* 6.9*   < > 6.8*   GLUCOSE mg/dL 174* 105* 130*   < > 119*   ALBUMIN g/dL  --  1.9* 2.2*  --  2.3*   BILIRUBIN mg/dL  --  0.2 0.2  --  0.2   ALK PHOS U/L  --  189* 205*  --  232*   AST (SGOT) U/L  --  16 15  --  11   ALT (SGPT) U/L  --  6 9  --  8    < > = values in this interval not displayed.   Estimated Creatinine Clearance: 12.7 mL/min (A) (by C-G formula based on SCr of 3.12 mg/dL (H)).  No results found for: \"AMMONIA\"    Glucose   Date/Time Value Ref Range Status   08/08/2024 0616 85 70 - 130 mg/dL Final   08/08/2024 0018 86 70 - 130 mg/dL Final   08/07/2024 1945 91 70 - 130 mg/dL Final   08/07/2024 1627 99 70 - 130 mg/dL Final   08/07/2024 1046 100 70 - 130 mg/dL Final   08/07/2024 0627 77 70 - 130 mg/dL Final   08/07/2024 0536 88 70 - 130 mg/dL Final   08/06/2024 2353 163 (H) 70 - 130 mg/dL Final     Lab Results   Component Value Date    HGBA1C 6.10 (H) 08/02/2024     Lab Results   Component Value Date    TSH 17.140 (H) 07/21/2024    FREET4 2.1 (H) 05/17/2024       Blood Culture   Date Value Ref Range Status   08/02/2024 No growth at 24 hours  Preliminary   08/02/2024 No growth at 24 hours  Preliminary     No results found for: \"URINECX\"  No results found for: \"WOUNDCX\"  No results found for: \"STOOLCX\"  No results found for: \"RESPCX\"  Pain Management Panel  More data exists         Latest Ref Rng & Units 7/21/2024 4/28/2024   Pain Management Panel   Amphetamine, Urine Qual " Negative Negative  Negative    Barbiturates Screen, Urine Negative Negative  Negative    Benzodiazepine Screen, Urine Negative Negative  Negative    Buprenorphine, Screen, Urine Negative Negative  Negative    Cocaine Screen, Urine Negative Negative  Negative    Fentanyl, Urine Negative Negative  Negative    Methadone Screen , Urine Negative Negative  Negative    Methamphetamine, Ur Negative Negative  Negative       Details                     ----------------------------------------------------------------------------------------------------------------------  Imaging Results (Last 24 Hours)       ** No results found for the last 24 hours. **            ----------------------------------------------------------------------------------------------------------------------    Pertinent Infectious Disease Results    Thank you Dr. Crawford for allowing us to participate in the care of your patient.  As you well know, Ms. Reny Elena is a 64 y.o. female with past medical history significant for seizure disorder, type 2 diabetes, arthritis, CHF, hypothyroidism, hypertension, hyperlipidemia, GERD, PAD, ESRD, iron deficiency anemia,, who presented to [facility] Emergency Department on 8/2/2024 for nausea, vomiting, diarrhea and worsening pain of right diabetic foot wound.  Patient was recently seen in our ED on 7/21/2024 with complaints of hallucinations, fever, and right foot pain.  At this time, patient was diagnosed with right heel ulcer and UTI.  She was sent home with an antibiotic but was unsure of the name.  Patient reported she started having nausea and vomiting after taking the antibiotics so she stopped taking them.  However, she continued to have nausea and vomiting.       Patient was tachycardic in the ED.Labs show K+ at upper limit of normal, BUN 30, cr 4.36, glucose 176, alk phos 323 and albumin 2.9, otherwise normal LFTs. CRP mildly elevated at 2.34, while lactate and WBC are normal. UA shows  large leuk esterase and unable to determine RBC, WBC, bacteria of squamous cells due to loaded field.  Patient's urine culture from 7/21/2024 grew E. coli.  She had also been following with wound care who it obtained a wound culture on the same day.  This culture finalized with mixed gram-negative maurisio and light growth of Enterococcus faecalis.       X-ray of the right foot performed on 8/2/2024 revealed partial amputation of the fifth metatarsal. Bony demineralization. No acute fracture or dislocation. No lytic or blastic bony lesions seen. Diffuse interphalangeal joint space loss. Mild degenerative changes in the midfoot. No cortical erosion. Fixation timur in the tibia/talus/calcaneus noted. Diffuse soft tissue swelling. Vascular calcifications. No soft tissue gas.  CT abdomen/pelvis revealed peritoneal dialysis catheter noted in the anterior right abdominal wall with the catheter noted to terminate in the left lateral pelvis. Small volume of free fluid in the right upper quadrant and lower posterior pelvis and right lower quadrant. Slightly elevated left hemidiaphragm. No bowel or renal obstruction. No abdominal aortic aneurysm. No features of acute appendicitis. Stranding identified in the presacral space could present changes related to mild distal colitis. Fluid-filled bladder with small volume of air in the bladder lumen. No pneumatosis. No conclusive features of free air. Distended configuration to the gallbladder likely due to fasting. No conclusive features of gastritis or enteritis.       Patient continued to have nausea and vomiting after 2 doses of Zofran.  She was admitted for further treatment.  Subsequently, patient was started on IV Rocephin in the setting of E. coli UTI.  Flagyl was added to cover possible colitis.  Patient was also started on IV vancomycin for Enterococcus faecalis wound infection.  General surgery consulted for possible debridement of patient's wound.     Assessment/Plan        Assessment     R foot wound infection, Enterococcus faecalis  Possible osteomyelitis of R foot  Hardware present in RLE  E. Coli UTI  Possible colitis         Plan      The patient is awake and alert, resting comfortably in bed.  Primary RN at the bedside.  The patient does complain of foot pain and nausea this morning.  On room air with no apparent distress.  Afebrile.  Denies diarrhea.  Lung exam is clear with diminished bases bilaterally.  Abdomen is soft and nontender with normal active bowel sounds.  The right foot is dressed with Mepilex dressings, clean dry and intact.  Right foot wound culture is finalized this morning with moderate growth mixed gram-negative maurisio and moderate growth Enterococcus faecalis.    The patient recently completed metronidazole course in setting of colitis.  Continue with vancomycin and ceftriaxone in the setting of E. coli UTI and gram-negative maurisio and Enterococcus faecalis on wound culture.  The patient reports she is scheduled for AKA tomorrow.  If AKA is not performed and removal of hardware, infection will persist due to the presence of hardware.  The patient will require lifelong antibiotic therapy.  Will continue to monitor closely into Stemetic care which is appropriate.      ANTIMICROBIAL THERAPY    cefdinir - 300 MG  cefTRIAXone (ROCEPHIN) 1000 mg IVPB in 100 mL NS (VTB)  Vancomycin Pharmacy Intermittent/Pulse Dosing     Code Status:   Code Status and Medical Interventions: No CPR (Do Not Attempt to Resuscitate); Limited Support; No intubation (DNI)   Ordered at: 08/04/24 9914     Medical Intervention Limits:    No intubation (DNI)     Code Status (Patient has no pulse and is not breathing):    No CPR (Do Not Attempt to Resuscitate)     Medical Interventions (Patient has pulse or is breathing):    Limited Support       DARIUS Pike  08/08/24  09:46 EDT

## 2024-08-08 NOTE — PROGRESS NOTES
Adult Nutrition  Assessment/PES    Patient Name:  Reny Elena  YOB: 1960  MRN: [id number]  Admit Date:  8/2/2024    Assessment Date:  8/8/2024    Comments:  Noted plan for AKA pending.    Poor po, noted SLP consult pt refused all po presentations.    Recommend: Clarify if care goals will include nutrition support. Re-weigh pt noted 10# loss in one day.    If pt drinks Novasource Renal TID can meet >75% of est needs.    Will cont to follow and monitor.      Reason for Assessment       Row Name 08/08/24 1814          Reason for Assessment    Reason For Assessment follow-up protocol     Diagnosis infection/sepsis;renal disease;diabetes diagnosis/complications  R heel ulcer exposed bone OM-possible AKA, UTI, ESRD on PD, DM , Severe Malnutrition                    Nutrition/Diet History       Row Name 08/08/24 1814          Nutrition/Diet History    Typical Intake (Food/Fluid/EN/PN) Called to room no answer                    Labs/Tests/Procedures/Meds       Row Name 08/08/24 1815          Labs/Procedures/Meds    Lab Results Reviewed reviewed     Lab Results Comments Creat3.1        Diagnostic Tests/Procedures    Diagnostic Test/Procedure Reviewed reviewed        Medications    Pertinent Medications Reviewed reviewed     Pertinent Medications Comments iron, folic acid                        Nutrition Prescription Ordered       Row Name 08/08/24 1815          Nutrition Prescription PO    Current PO Diet Soft Texture     Texture Ground     Supplement Novasource Renal (Nepro)     Supplement Frequency 3 times a day     Common Modifiers Consistent Carbohydrate                    Evaluation of Received Nutrient/Fluid Intake       Row Name 08/08/24 1816          Fluid Intake Evaluation    Oral Fluid (mL) --  560 ave x 3        PO Evaluation    % PO Intake 0, 0, 25, then NPO                       Problem/Interventions:   Problem 1       Row Name 08/08/24 1817          Nutrition Diagnoses Problem 1    Problem 1  Other (comment)  Severe chronic disease related malnutrition related to Heel Infection, UTI, ESRD, DM as evidenced by less 75% of est energy requirement >= 1 month, severe muscle wasting & fat loss per RD exam, > 7.5% loss BW 3 months.                          Intervention Goal       Row Name 08/08/24 1817          Intervention Goal    PO Increase intake     PO Intake % 50 %  consistently meals/supplement                    Nutrition Intervention       Row Name 08/08/24 1818          Nutrition Intervention    RD/Tech Action Follow Tx progress                      Education/Evaluation       Row Name 08/08/24 1818          Education    Education Education not appropriate at this time        Monitor/Evaluation    Monitor Per protocol;I&O;PO intake;Supplement intake;Pertinent labs;Weight;Symptoms                     Electronically signed by:  Lay Gonzalez RD  08/08/24 18:19 EDT

## 2024-08-08 NOTE — PLAN OF CARE
Goal Outcome Evaluation:              Outcome Evaluation: Pt's VSS on RA. Pt c/o of pain, PRN medication given. Wound care completed per orders. No concerns or complaints at this time. Will follow POC.

## 2024-08-08 NOTE — PLAN OF CARE
Goal Outcome Evaluation:  Plan of Care Reviewed With: patient        Progress: no change  Outcome Evaluation: Pt is resting in bed. Wound care completed per orders. PRN pain meds provided per pt request, pain symptoms resolved. VSS on RA. No acute changes or complaints at this time. Will continue POC.

## 2024-08-08 NOTE — PROGRESS NOTES
"Nephrology Progress Note      Subjective     No chest pain, shortness of breath    Objective       Vital signs :     Temp:  [98.4 °F (36.9 °C)-98.7 °F (37.1 °C)] 98.4 °F (36.9 °C)  Heart Rate:  [] 99  Resp:  [18-20] 18  BP: (131-168)/(59-88) 157/71    Intake/Output                         08/06/24 0701 - 08/07/24 0700 08/07/24 0701 - 08/08/24 0700     [id number] 4629-1093 Total 6441-6827 8688-3629 Total                 Intake    P.O.  240  480 720  480  240 720    I.V.  --  297.9 297.9  350  -- 350    Total Intake 240 777.9 1017.9        Output    Dialysis  --  -- --  827  -- 827    Total Output -- -- -- 827 -- 827             Physical Exam:    General Appearance : alert, pleasant, appears stated age, cooperative and oriented  Lungs : clear to auscultation, respirations regular  Heart :  regular rhythm & normal rate, normal S1, S2 and no murmur, no rub  Abdomen : Soft, nondistended  Extremities : No edema,   Neurologic :   orientated to person, place, time and situation, Grossly no focal deficits        Laboratory Data :     Albumin Albumin   Date Value Ref Range Status   08/06/2024 1.9 (L) 3.5 - 5.2 g/dL Final      Magnesium No results found for: \"MG\"       PTH               No results found for: \"PTH\"    CBC and coagulation:  Results from last 7 days   Lab Units 08/07/24  0047 08/06/24  0229 08/05/24  0123 08/04/24  0300 08/04/24  0216 08/03/24  0534 08/02/24  1916   LACTATE mmol/L  --   --   --   --   --   --  1.1   CRP mg/dL  --   --   --   --  1.86* 1.83* 2.25*  2.34*   WBC 10*3/mm3 7.20 10.55 12.26*   < >  --  9.04 9.44   HEMOGLOBIN g/dL 9.3* 9.5* 10.2*   < >  --  10.7* 13.0   HEMATOCRIT % 30.6* 30.1* 35.0   < >  --  34.4 40.5   MCV fL 101.7* 99.0* 108.4*   < >  --  98.6* 96.2   MCHC g/dL 30.4* 31.6 29.1*   < >  --  31.1* 32.1   PLATELETS 10*3/mm3 172 178 102*   < >  --  185 251    < > = values in this interval not displayed.     Acid/base balance:      Renal and electrolytes:    Results from " last 7 days   Lab Units 08/07/24  0047 08/06/24 0229 08/05/24 0223 08/04/24  0216 08/03/24  0534   SODIUM mmol/L 140 136 138 137 137   POTASSIUM mmol/L 4.3 5.1 3.8 4.2 4.7   CHLORIDE mmol/L 109* 107 103 104 100   CO2 mmol/L 19.0* 17.8* 20.6* 19.1* 23.0   BUN mg/dL 20 21 21 27* 31*   CREATININE mg/dL 3.12* 3.19* 3.19* 3.80* 4.26*   CALCIUM mg/dL 6.7* 6.6* 6.9* 6.4* 6.8*     Estimated Creatinine Clearance: 12.7 mL/min (A) (by C-G formula based on SCr of 3.12 mg/dL (H)).  @GFRCG:3@   Liver and pancreatic function:  Results from last 7 days   Lab Units 08/06/24 0229 08/05/24 0223 08/03/24  0534 08/02/24  1916   ALBUMIN g/dL 1.9* 2.2* 2.3* 2.9*   BILIRUBIN mg/dL 0.2 0.2 0.2 0.4   ALK PHOS U/L 189* 205* 232* 323*   AST (SGOT) U/L 16 15 11 18   ALT (SGPT) U/L 6 9 8 11   LIPASE U/L  --   --   --  25         Cardiac:      Liver and pancreatic function:  Results from last 7 days   Lab Units 08/06/24 0229 08/05/24 0223 08/03/24  0534 08/02/24  1916   ALBUMIN g/dL 1.9* 2.2* 2.3* 2.9*   BILIRUBIN mg/dL 0.2 0.2 0.2 0.4   ALK PHOS U/L 189* 205* 232* 323*   AST (SGOT) U/L 16 15 11 18   ALT (SGPT) U/L 6 9 8 11   LIPASE U/L  --   --   --  25       Medications :     aspirin, 81 mg, Oral, Daily  atorvastatin, 80 mg, Oral, Nightly  cefTRIAXone, 1,000 mg, Intravenous, Q24H  ferrous sulfate, 325 mg, Oral, Daily With Breakfast  FLUoxetine, 60 mg, Oral, Daily  folic acid, 1 mg, Oral, Daily  heparin (porcine), 5,000 Units, Subcutaneous, Q12H  lacosamide, 50 mg, Oral, Q12H  levothyroxine, 100 mcg, Oral, QAM AC  losartan, 50 mg, Oral, Daily  NIFEdipine XL, 90 mg, Oral, Daily  pantoprazole, 40 mg, Oral, QAM AC  potassium chloride, 20 mEq, Oral, BID  rOPINIRole, 0.5 mg, Oral, BID  sodium chloride, 10 mL, Intravenous, Q12H  traZODone, 50 mg, Oral, Nightly  Vancomycin Pharmacy Intermittent/Pulse Dosing, , Does not apply, Daily               Assessment & Plan     -End-stage renal disease on peritoneal dialysis  - Type 2 diabetes mellitus with  renal involvement  - Persistent nausea and vomiting  - Cachexia and malnutrition      Continue CCPD with 1.5% dialysate    Significant protein calorie malnutrition, educated and counseled the patient and to continue on Nepro 3 times a day     Nicholas Arroyo MD  08/08/24  08:47 EDT

## 2024-08-09 ENCOUNTER — ANESTHESIA (OUTPATIENT)
Dept: PERIOP | Facility: HOSPITAL | Age: 64
End: 2024-08-09
Payer: MEDICARE

## 2024-08-09 LAB
ANION GAP SERPL CALCULATED.3IONS-SCNC: 13.4 MMOL/L (ref 5–15)
BUN SERPL-MCNC: 18 MG/DL (ref 8–23)
BUN/CREAT SERPL: 5.9 (ref 7–25)
CALCIUM SPEC-SCNC: 6.9 MG/DL (ref 8.6–10.5)
CHLORIDE SERPL-SCNC: 103 MMOL/L (ref 98–107)
CO2 SERPL-SCNC: 19.6 MMOL/L (ref 22–29)
CREAT SERPL-MCNC: 3.05 MG/DL (ref 0.57–1)
DEPRECATED RDW RBC AUTO: 51.8 FL (ref 37–54)
EGFRCR SERPLBLD CKD-EPI 2021: 16.3 ML/MIN/1.73
ERYTHROCYTE [DISTWIDTH] IN BLOOD BY AUTOMATED COUNT: 14 % (ref 12.3–15.4)
GLUCOSE BLDC GLUCOMTR-MCNC: 129 MG/DL (ref 70–130)
GLUCOSE BLDC GLUCOMTR-MCNC: 130 MG/DL (ref 70–130)
GLUCOSE BLDC GLUCOMTR-MCNC: 176 MG/DL (ref 70–130)
GLUCOSE BLDC GLUCOMTR-MCNC: 213 MG/DL (ref 70–130)
GLUCOSE BLDC GLUCOMTR-MCNC: 241 MG/DL (ref 70–130)
GLUCOSE SERPL-MCNC: 173 MG/DL (ref 65–99)
HCT VFR BLD AUTO: 31.6 % (ref 34–46.6)
HGB BLD-MCNC: 9.5 G/DL (ref 12–15.9)
INR PPP: 0.96 (ref 0.9–1.1)
MCH RBC QN AUTO: 30.7 PG (ref 26.6–33)
MCHC RBC AUTO-ENTMCNC: 30.1 G/DL (ref 31.5–35.7)
MCV RBC AUTO: 102.3 FL (ref 79–97)
PLATELET # BLD AUTO: 149 10*3/MM3 (ref 140–450)
PMV BLD AUTO: 9.8 FL (ref 6–12)
POTASSIUM SERPL-SCNC: 4 MMOL/L (ref 3.5–5.2)
PROTHROMBIN TIME: 12.8 SECONDS (ref 12.1–14.7)
RBC # BLD AUTO: 3.09 10*6/MM3 (ref 3.77–5.28)
SODIUM SERPL-SCNC: 136 MMOL/L (ref 136–145)
WBC NRBC COR # BLD AUTO: 5.34 10*3/MM3 (ref 3.4–10.8)

## 2024-08-09 PROCEDURE — 25010000002 CEFAZOLIN PER 500 MG: Performed by: SURGERY

## 2024-08-09 PROCEDURE — 88307 TISSUE EXAM BY PATHOLOGIST: CPT

## 2024-08-09 PROCEDURE — 85610 PROTHROMBIN TIME: CPT | Performed by: STUDENT IN AN ORGANIZED HEALTH CARE EDUCATION/TRAINING PROGRAM

## 2024-08-09 PROCEDURE — 25010000002 ONDANSETRON PER 1 MG: Performed by: NURSE ANESTHETIST, CERTIFIED REGISTERED

## 2024-08-09 PROCEDURE — 82948 REAGENT STRIP/BLOOD GLUCOSE: CPT

## 2024-08-09 PROCEDURE — 25010000002 FENTANYL CITRATE (PF) 50 MCG/ML SOLUTION: Performed by: NURSE ANESTHETIST, CERTIFIED REGISTERED

## 2024-08-09 PROCEDURE — 25010000002 HEPARIN (PORCINE) PER 1000 UNITS: Performed by: SURGERY

## 2024-08-09 PROCEDURE — 25010000002 PROCHLORPERAZINE 10 MG/2ML SOLUTION: Performed by: STUDENT IN AN ORGANIZED HEALTH CARE EDUCATION/TRAINING PROGRAM

## 2024-08-09 PROCEDURE — 27590 AMPUTATE LEG AT THIGH: CPT | Performed by: SURGERY

## 2024-08-09 PROCEDURE — 0Y6C0Z3 DETACHMENT AT RIGHT UPPER LEG, LOW, OPEN APPROACH: ICD-10-PCS | Performed by: SURGERY

## 2024-08-09 PROCEDURE — 25010000002 HYDROMORPHONE PER 4 MG: Performed by: SURGERY

## 2024-08-09 PROCEDURE — 25010000002 CEFTRIAXONE PER 250 MG: Performed by: NURSE PRACTITIONER

## 2024-08-09 PROCEDURE — 80048 BASIC METABOLIC PNL TOTAL CA: CPT | Performed by: STUDENT IN AN ORGANIZED HEALTH CARE EDUCATION/TRAINING PROGRAM

## 2024-08-09 PROCEDURE — C1713 ANCHOR/SCREW BN/BN,TIS/BN: HCPCS | Performed by: SURGERY

## 2024-08-09 PROCEDURE — 99232 SBSQ HOSP IP/OBS MODERATE 35: CPT | Performed by: NURSE PRACTITIONER

## 2024-08-09 PROCEDURE — 88311 DECALCIFY TISSUE: CPT

## 2024-08-09 PROCEDURE — 25010000002 BUPIVACAINE 0.5 % SOLUTION: Performed by: SURGERY

## 2024-08-09 PROCEDURE — 99232 SBSQ HOSP IP/OBS MODERATE 35: CPT | Performed by: HOSPITALIST

## 2024-08-09 PROCEDURE — 25010000002 DEXAMETHASONE PER 1 MG: Performed by: NURSE ANESTHETIST, CERTIFIED REGISTERED

## 2024-08-09 PROCEDURE — 25010000002 HEPARIN (PORCINE) PER 1000 UNITS: Performed by: INTERNAL MEDICINE

## 2024-08-09 PROCEDURE — 25010000002 PROPOFOL 10 MG/ML EMULSION: Performed by: NURSE ANESTHETIST, CERTIFIED REGISTERED

## 2024-08-09 PROCEDURE — 25810000003 SODIUM CHLORIDE 0.9 % SOLUTION: Performed by: NURSE ANESTHETIST, CERTIFIED REGISTERED

## 2024-08-09 PROCEDURE — 85027 COMPLETE CBC AUTOMATED: CPT | Performed by: STUDENT IN AN ORGANIZED HEALTH CARE EDUCATION/TRAINING PROGRAM

## 2024-08-09 DEVICE — ARISTA AH ABSORBABLE HEMOSTATIC PARTICLES
Type: IMPLANTABLE DEVICE | Site: LEG | Status: FUNCTIONAL
Brand: ARISTA™ AH

## 2024-08-09 DEVICE — BONE WAX
Type: IMPLANTABLE DEVICE | Site: LEG | Status: FUNCTIONAL
Brand: ETHICON

## 2024-08-09 RX ORDER — OXYCODONE HYDROCHLORIDE 5 MG/1
5 TABLET ORAL EVERY 4 HOURS PRN
Status: DISCONTINUED | OUTPATIENT
Start: 2024-08-09 | End: 2024-08-11

## 2024-08-09 RX ORDER — HYDROMORPHONE HYDROCHLORIDE 1 MG/ML
0.25 INJECTION, SOLUTION INTRAMUSCULAR; INTRAVENOUS; SUBCUTANEOUS
Status: DISPENSED | OUTPATIENT
Start: 2024-08-09 | End: 2024-08-10

## 2024-08-09 RX ORDER — LIDOCAINE HYDROCHLORIDE 20 MG/ML
INJECTION, SOLUTION EPIDURAL; INFILTRATION; INTRACAUDAL; PERINEURAL AS NEEDED
Status: DISCONTINUED | OUTPATIENT
Start: 2024-08-09 | End: 2024-08-09 | Stop reason: SURG

## 2024-08-09 RX ORDER — SODIUM CHLORIDE, SODIUM LACTATE, POTASSIUM CHLORIDE, CALCIUM CHLORIDE 600; 310; 30; 20 MG/100ML; MG/100ML; MG/100ML; MG/100ML
100 INJECTION, SOLUTION INTRAVENOUS ONCE AS NEEDED
Status: DISCONTINUED | OUTPATIENT
Start: 2024-08-09 | End: 2024-08-09 | Stop reason: HOSPADM

## 2024-08-09 RX ORDER — MAGNESIUM HYDROXIDE 1200 MG/15ML
LIQUID ORAL AS NEEDED
Status: DISCONTINUED | OUTPATIENT
Start: 2024-08-09 | End: 2024-08-09 | Stop reason: HOSPADM

## 2024-08-09 RX ORDER — ONDANSETRON 2 MG/ML
4 INJECTION INTRAMUSCULAR; INTRAVENOUS AS NEEDED
Status: DISCONTINUED | OUTPATIENT
Start: 2024-08-09 | End: 2024-08-09 | Stop reason: HOSPADM

## 2024-08-09 RX ORDER — SODIUM CHLORIDE 0.9 % (FLUSH) 0.9 %
10 SYRINGE (ML) INJECTION EVERY 12 HOURS SCHEDULED
Status: DISCONTINUED | OUTPATIENT
Start: 2024-08-09 | End: 2024-08-09 | Stop reason: HOSPADM

## 2024-08-09 RX ORDER — ONDANSETRON 2 MG/ML
INJECTION INTRAMUSCULAR; INTRAVENOUS AS NEEDED
Status: DISCONTINUED | OUTPATIENT
Start: 2024-08-09 | End: 2024-08-09 | Stop reason: SURG

## 2024-08-09 RX ORDER — SODIUM CHLORIDE 0.9 % (FLUSH) 0.9 %
10 SYRINGE (ML) INJECTION AS NEEDED
Status: DISCONTINUED | OUTPATIENT
Start: 2024-08-09 | End: 2024-08-09 | Stop reason: HOSPADM

## 2024-08-09 RX ORDER — MIDAZOLAM HYDROCHLORIDE 1 MG/ML
0.5 INJECTION INTRAMUSCULAR; INTRAVENOUS
Status: DISCONTINUED | OUTPATIENT
Start: 2024-08-09 | End: 2024-08-09 | Stop reason: HOSPADM

## 2024-08-09 RX ORDER — FENTANYL CITRATE 50 UG/ML
50 INJECTION, SOLUTION INTRAMUSCULAR; INTRAVENOUS
Status: DISCONTINUED | OUTPATIENT
Start: 2024-08-09 | End: 2024-08-09 | Stop reason: HOSPADM

## 2024-08-09 RX ORDER — ACETAMINOPHEN 500 MG
1000 TABLET ORAL EVERY 6 HOURS
Status: DISCONTINUED | OUTPATIENT
Start: 2024-08-09 | End: 2024-08-12

## 2024-08-09 RX ORDER — SODIUM CHLORIDE 9 MG/ML
40 INJECTION, SOLUTION INTRAVENOUS AS NEEDED
Status: DISCONTINUED | OUTPATIENT
Start: 2024-08-09 | End: 2024-08-09 | Stop reason: HOSPADM

## 2024-08-09 RX ORDER — SODIUM CHLORIDE 9 MG/ML
INJECTION, SOLUTION INTRAVENOUS CONTINUOUS PRN
Status: DISCONTINUED | OUTPATIENT
Start: 2024-08-09 | End: 2024-08-09 | Stop reason: SURG

## 2024-08-09 RX ORDER — SODIUM CHLORIDE, SODIUM LACTATE, POTASSIUM CHLORIDE, CALCIUM CHLORIDE 600; 310; 30; 20 MG/100ML; MG/100ML; MG/100ML; MG/100ML
125 INJECTION, SOLUTION INTRAVENOUS ONCE
Status: DISCONTINUED | OUTPATIENT
Start: 2024-08-09 | End: 2024-08-09 | Stop reason: HOSPADM

## 2024-08-09 RX ORDER — BUPIVACAINE HYDROCHLORIDE 5 MG/ML
INJECTION, SOLUTION PERINEURAL AS NEEDED
Status: DISCONTINUED | OUTPATIENT
Start: 2024-08-09 | End: 2024-08-09 | Stop reason: HOSPADM

## 2024-08-09 RX ORDER — FENTANYL CITRATE 50 UG/ML
INJECTION, SOLUTION INTRAMUSCULAR; INTRAVENOUS AS NEEDED
Status: DISCONTINUED | OUTPATIENT
Start: 2024-08-09 | End: 2024-08-09 | Stop reason: SURG

## 2024-08-09 RX ORDER — FAMOTIDINE 10 MG/ML
INJECTION, SOLUTION INTRAVENOUS AS NEEDED
Status: DISCONTINUED | OUTPATIENT
Start: 2024-08-09 | End: 2024-08-09 | Stop reason: SURG

## 2024-08-09 RX ORDER — METHOCARBAMOL 750 MG/1
750 TABLET, FILM COATED ORAL 4 TIMES DAILY
Status: DISCONTINUED | OUTPATIENT
Start: 2024-08-09 | End: 2024-08-10

## 2024-08-09 RX ORDER — PROPOFOL 10 MG/ML
VIAL (ML) INTRAVENOUS AS NEEDED
Status: DISCONTINUED | OUTPATIENT
Start: 2024-08-09 | End: 2024-08-09 | Stop reason: SURG

## 2024-08-09 RX ORDER — EPHEDRINE SULFATE 5 MG/ML
INJECTION INTRAVENOUS AS NEEDED
Status: DISCONTINUED | OUTPATIENT
Start: 2024-08-09 | End: 2024-08-09 | Stop reason: SURG

## 2024-08-09 RX ORDER — IPRATROPIUM BROMIDE AND ALBUTEROL SULFATE 2.5; .5 MG/3ML; MG/3ML
3 SOLUTION RESPIRATORY (INHALATION) ONCE AS NEEDED
Status: DISCONTINUED | OUTPATIENT
Start: 2024-08-09 | End: 2024-08-09 | Stop reason: HOSPADM

## 2024-08-09 RX ORDER — DEXAMETHASONE SODIUM PHOSPHATE 4 MG/ML
INJECTION, SOLUTION INTRA-ARTICULAR; INTRALESIONAL; INTRAMUSCULAR; INTRAVENOUS; SOFT TISSUE AS NEEDED
Status: DISCONTINUED | OUTPATIENT
Start: 2024-08-09 | End: 2024-08-09 | Stop reason: SURG

## 2024-08-09 RX ADMIN — NIFEDIPINE 90 MG: 30 TABLET, FILM COATED, EXTENDED RELEASE ORAL at 08:06

## 2024-08-09 RX ADMIN — CEFAZOLIN 2000 MG: 2 INJECTION, POWDER, FOR SOLUTION INTRAMUSCULAR; INTRAVENOUS at 13:50

## 2024-08-09 RX ADMIN — FERROUS SULFATE TAB 325 MG (65 MG ELEMENTAL FE) 325 MG: 325 (65 FE) TAB at 08:06

## 2024-08-09 RX ADMIN — FOLIC ACID 1 MG: 1 TABLET ORAL at 08:06

## 2024-08-09 RX ADMIN — FLUOXETINE HYDROCHLORIDE 60 MG: 20 CAPSULE ORAL at 08:06

## 2024-08-09 RX ADMIN — FENTANYL CITRATE 50 MCG: 50 INJECTION, SOLUTION INTRAMUSCULAR; INTRAVENOUS at 15:58

## 2024-08-09 RX ADMIN — ONDANSETRON 4 MG: 2 INJECTION INTRAMUSCULAR; INTRAVENOUS at 13:50

## 2024-08-09 RX ADMIN — LIDOCAINE HYDROCHLORIDE 100 MG: 20 INJECTION, SOLUTION EPIDURAL; INFILTRATION; INTRACAUDAL; PERINEURAL at 13:55

## 2024-08-09 RX ADMIN — PROCHLORPERAZINE EDISYLATE 5 MG: 5 INJECTION INTRAMUSCULAR; INTRAVENOUS at 08:45

## 2024-08-09 RX ADMIN — LACOSAMIDE 50 MG: 50 TABLET, FILM COATED ORAL at 08:06

## 2024-08-09 RX ADMIN — FENTANYL CITRATE 50 MCG: 50 INJECTION, SOLUTION INTRAMUSCULAR; INTRAVENOUS at 15:53

## 2024-08-09 RX ADMIN — ACETAMINOPHEN 1000 MG: 500 TABLET ORAL at 22:26

## 2024-08-09 RX ADMIN — HEPARIN SODIUM 5000 UNITS: 5000 INJECTION INTRAVENOUS; SUBCUTANEOUS at 21:12

## 2024-08-09 RX ADMIN — FAMOTIDINE 20 MG: 10 INJECTION, SOLUTION INTRAVENOUS at 13:50

## 2024-08-09 RX ADMIN — SODIUM CHLORIDE: 9 INJECTION, SOLUTION INTRAVENOUS at 13:50

## 2024-08-09 RX ADMIN — ROPINIROLE HYDROCHLORIDE 0.5 MG: 0.25 TABLET, FILM COATED ORAL at 08:06

## 2024-08-09 RX ADMIN — CEFTRIAXONE 1000 MG: 1 INJECTION, POWDER, FOR SOLUTION INTRAMUSCULAR; INTRAVENOUS at 22:25

## 2024-08-09 RX ADMIN — ROPINIROLE HYDROCHLORIDE 0.5 MG: 0.25 TABLET, FILM COATED ORAL at 21:12

## 2024-08-09 RX ADMIN — LACOSAMIDE 50 MG: 50 TABLET, FILM COATED ORAL at 21:13

## 2024-08-09 RX ADMIN — PROPOFOL 100 MG: 10 INJECTION, EMULSION INTRAVENOUS at 13:55

## 2024-08-09 RX ADMIN — DEXAMETHASONE SODIUM PHOSPHATE 4 MG: 4 INJECTION, SOLUTION INTRA-ARTICULAR; INTRALESIONAL; INTRAMUSCULAR; INTRAVENOUS; SOFT TISSUE at 13:55

## 2024-08-09 RX ADMIN — EPHEDRINE SULFATE 10 MG: 5 INJECTION INTRAVENOUS at 14:43

## 2024-08-09 RX ADMIN — LEVOTHYROXINE SODIUM 100 MCG: 0.1 TABLET ORAL at 08:06

## 2024-08-09 RX ADMIN — PANTOPRAZOLE SODIUM 40 MG: 40 TABLET, DELAYED RELEASE ORAL at 08:06

## 2024-08-09 RX ADMIN — HEPARIN SODIUM 5000 UNITS: 5000 INJECTION INTRAVENOUS; SUBCUTANEOUS at 08:06

## 2024-08-09 RX ADMIN — FENTANYL CITRATE 50 MCG: 50 INJECTION INTRAMUSCULAR; INTRAVENOUS at 15:04

## 2024-08-09 RX ADMIN — Medication 10 ML: at 21:12

## 2024-08-09 RX ADMIN — TRAZODONE HYDROCHLORIDE 50 MG: 50 TABLET ORAL at 21:12

## 2024-08-09 RX ADMIN — ATORVASTATIN CALCIUM 80 MG: 40 TABLET, FILM COATED ORAL at 21:13

## 2024-08-09 RX ADMIN — ASPIRIN 81 MG: 81 TABLET, COATED ORAL at 08:06

## 2024-08-09 RX ADMIN — HYDROMORPHONE HYDROCHLORIDE 0.25 MG: 1 INJECTION, SOLUTION INTRAMUSCULAR; INTRAVENOUS; SUBCUTANEOUS at 19:22

## 2024-08-09 RX ADMIN — FENTANYL CITRATE 50 MCG: 50 INJECTION INTRAMUSCULAR; INTRAVENOUS at 15:16

## 2024-08-09 RX ADMIN — LOSARTAN POTASSIUM 50 MG: 50 TABLET, FILM COATED ORAL at 08:06

## 2024-08-09 RX ADMIN — METHOCARBAMOL 750 MG: 750 TABLET ORAL at 21:13

## 2024-08-09 NOTE — PROGRESS NOTES
PROGRESS NOTE         Patient Identification:  Name:  Reny Elena  Age:  64 y.o.  Sex:  female  :  1960  MRN:  [id number]  Visit Number:  [id number]  Primary Care Provider:  Susan Stephens APRN         LOS: 6 days       ----------------------------------------------------------------------------------------------------------------------  Subjective       Chief Complaints:    Vomiting        Interval History:      The patient is awake and alert, resting in bed.  Complains of nausea but denies any vomiting.  On room air with no apparent distress.  Denies diarrhea.  Afebrile.  Lung exam is clear to auscultation bilaterally.  Abdomen is soft and nontender with normoactive bowel sounds primary RN at the bedside reported that the patient is scheduled for surgery today for AKA.  WBC normal at 5.34.  Right foot wound culture from 8/3 finalized with moderate growth mixed gram-negative maurisio and Enterococcus faecalis.      Review of Systems:    Constitutional: no fever, chills and night sweats.  Generalized fatigue.  Eyes: no eye drainage, itching or redness.  HEENT: no mouth sores, dysphagia or nose bleed.  Respiratory: no for shortness of breath, cough or production of sputum.  Cardiovascular: no chest pain, no palpitations, no orthopnea.  Gastrointestinal:Positive nausea, No documented vomiting. No diarrhea. No abdominal pain, hematemesis or rectal bleeding.  Genitourinary: peritoneal dialysis. no dysuria or polyuria.  Hematologic/lymphatic: no lymph node abnormalities, no easy bruising or easy bleeding.  Musculoskeletal: no muscle or joint pain.  Skin: R foot wound. No rash and no itching.  Neurological: no loss of consciousness, no seizure, no headache.  Behavioral/Psych: no depression or suicidal ideation.  Endocrine: no hot flashes.  Immunologic: negative.    ----------------------------------------------------------------------------------------------------------------------      Objective        Current Hospital Meds:  aspirin, 81 mg, Oral, Daily  atorvastatin, 80 mg, Oral, Nightly  cefTRIAXone, 1,000 mg, Intravenous, Q24H  ferrous sulfate, 325 mg, Oral, Daily With Breakfast  FLUoxetine, 60 mg, Oral, Daily  folic acid, 1 mg, Oral, Daily  heparin (porcine), 5,000 Units, Subcutaneous, Q12H  lacosamide, 50 mg, Oral, Q12H  levothyroxine, 100 mcg, Oral, QAM AC  losartan, 50 mg, Oral, Daily  NIFEdipine XL, 90 mg, Oral, Daily  pantoprazole, 40 mg, Oral, QAM AC  rOPINIRole, 0.5 mg, Oral, BID  sodium chloride, 10 mL, Intravenous, Q12H  traZODone, 50 mg, Oral, Nightly  Vancomycin Pharmacy Intermittent/Pulse Dosing, , Does not apply, Daily           ----------------------------------------------------------------------------------------------------------------------    Vital Signs:  Temp:  [97.7 °F (36.5 °C)-98.7 °F (37.1 °C)] 98.7 °F (37.1 °C)  Heart Rate:  [] 96  Resp:  [18] 18  BP: (137-151)/(67-75) 151/71  Mean Arterial Pressure (Non-Invasive) for the past 24 hrs (Last 3 readings):   Noninvasive MAP (mmHg)   08/09/24 0600 88   08/09/24 0303 100   08/08/24 1910 86       SpO2 Percentage    08/08/24 0227 08/09/24 0303 08/09/24 0600   SpO2: 97% 98% 99%     SpO2:  [98 %-99 %] 99 %  on   ;   Device (Oxygen Therapy): room air    Body mass index is 17.1 kg/m².  Wt Readings from Last 3 Encounters:   08/09/24 45.2 kg (99 lb 10.4 oz)   07/26/24 45.4 kg (100 lb)   07/26/24 51.7 kg (114 lb)        Intake/Output Summary (Last 24 hours) at 8/9/2024 1022  Last data filed at 8/9/2024 0810  Gross per 24 hour   Intake 200 ml   Output 838 ml   Net -638 ml     NPO Diet NPO Type: Sips with Meds  ----------------------------------------------------------------------------------------------------------------------      Physical Exam:    Constitutional: Frail elderly female sitting up comfortably in bed.  Complains of nausea without vomiting.  On room air with no apparent distress.  HENT:  Head: Normocephalic and atraumatic.   Mouth:  Moist mucous membranes.    Eyes:  Conjunctivae and EOM are normal.  No scleral icterus.  Neck:  Neck supple.  No JVD present.    Cardiovascular:  Normal rate, regular rhythm and normal heart sounds with no murmur. No edema.  Pulmonary/Chest: Clear bilaterally to auscultation no respiratory distress, no wheezes, no crackles, with normal breath sounds and good air movement.  Abdominal:  peritoneal dialysis catheter. Soft.  Bowel sounds are normal.  No distension and positive mild diffuse tenderness.  Musculoskeletal: Right foot wound with dressing in place.  No edema, no tenderness, and no deformity.  No swelling or redness of joints.  Neurological:  Alert and oriented to person, place, and time.  No facial droop.  No slurred speech.   Skin: Right foot wound with dressing in place, CDI.  Skin is warm and dry.  No rash noted.  No pallor.   Psychiatric:  Normal mood and affect.  Behavior is normal.        ----------------------------------------------------------------------------------------------------------------------  Results from last 7 days   Lab Units 08/06/24  2031 08/06/24  1817   HSTROP T ng/L 121* 124*           Results from last 7 days   Lab Units 08/09/24  0049 08/07/24  0047 08/06/24  0229 08/04/24  0300 08/04/24  0216 08/03/24  0534 08/02/24  1916   CRP mg/dL  --   --   --   --  1.86* 1.83* 2.25*  2.34*   LACTATE mmol/L  --   --   --   --   --   --  1.1   WBC 10*3/mm3 5.34 7.20 10.55   < >  --  9.04 9.44   HEMOGLOBIN g/dL 9.5* 9.3* 9.5*   < >  --  10.7* 13.0   HEMATOCRIT % 31.6* 30.6* 30.1*   < >  --  34.4 40.5   MCV fL 102.3* 101.7* 99.0*   < >  --  98.6* 96.2   MCHC g/dL 30.1* 30.4* 31.6   < >  --  31.1* 32.1   PLATELETS 10*3/mm3 149 172 178   < >  --  185 251   INR  0.96  --   --   --   --   --   --     < > = values in this interval not displayed.     Results from last 7 days   Lab Units 08/09/24  0049 08/07/24  0047 08/06/24  0229 08/05/24  0223 08/04/24  0216 08/03/24  0534   SODIUM mmol/L  "136 140 136 138   < > 137   POTASSIUM mmol/L 4.0 4.3 5.1 3.8   < > 4.7   CHLORIDE mmol/L 103 109* 107 103   < > 100   CO2 mmol/L 19.6* 19.0* 17.8* 20.6*   < > 23.0   BUN mg/dL 18 20 21 21   < > 31*   CREATININE mg/dL 3.05* 3.12* 3.19* 3.19*   < > 4.26*   CALCIUM mg/dL 6.9* 6.7* 6.6* 6.9*   < > 6.8*   GLUCOSE mg/dL 173* 174* 105* 130*   < > 119*   ALBUMIN g/dL  --   --  1.9* 2.2*  --  2.3*   BILIRUBIN mg/dL  --   --  0.2 0.2  --  0.2   ALK PHOS U/L  --   --  189* 205*  --  232*   AST (SGOT) U/L  --   --  16 15  --  11   ALT (SGPT) U/L  --   --  6 9  --  8    < > = values in this interval not displayed.   Estimated Creatinine Clearance: 12.9 mL/min (A) (by C-G formula based on SCr of 3.05 mg/dL (H)).  No results found for: \"AMMONIA\"    Glucose   Date/Time Value Ref Range Status   08/09/2024 0627 129 70 - 130 mg/dL Final   08/09/2024 0133 176 (H) 70 - 130 mg/dL Final   08/08/2024 1921 123 70 - 130 mg/dL Final   08/08/2024 1816 90 70 - 130 mg/dL Final   08/08/2024 1215 86 70 - 130 mg/dL Final   08/08/2024 0616 85 70 - 130 mg/dL Final   08/08/2024 0018 86 70 - 130 mg/dL Final   08/07/2024 1945 91 70 - 130 mg/dL Final     Lab Results   Component Value Date    HGBA1C 6.10 (H) 08/02/2024     Lab Results   Component Value Date    TSH 17.140 (H) 07/21/2024    FREET4 2.1 (H) 05/17/2024       Blood Culture   Date Value Ref Range Status   08/02/2024 No growth at 24 hours  Preliminary   08/02/2024 No growth at 24 hours  Preliminary     No results found for: \"URINECX\"  No results found for: \"WOUNDCX\"  No results found for: \"STOOLCX\"  No results found for: \"RESPCX\"  Pain Management Panel  More data exists         Latest Ref Rng & Units 7/21/2024 4/28/2024   Pain Management Panel   Amphetamine, Urine Qual Negative Negative  Negative    Barbiturates Screen, Urine Negative Negative  Negative    Benzodiazepine Screen, Urine Negative Negative  Negative    Buprenorphine, Screen, Urine Negative Negative  Negative    Cocaine Screen, Urine " Negative Negative  Negative    Fentanyl, Urine Negative Negative  Negative    Methadone Screen , Urine Negative Negative  Negative    Methamphetamine, Ur Negative Negative  Negative       Details                     ----------------------------------------------------------------------------------------------------------------------  Imaging Results (Last 24 Hours)       ** No results found for the last 24 hours. **            ----------------------------------------------------------------------------------------------------------------------    Pertinent Infectious Disease Results    Thank you Dr. Crawford for allowing us to participate in the care of your patient.  As you well know, Ms. Reny Elena is a 64 y.o. female with past medical history significant for seizure disorder, type 2 diabetes, arthritis, CHF, hypothyroidism, hypertension, hyperlipidemia, GERD, PAD, ESRD, iron deficiency anemia,, who presented to [facility] Emergency Department on 8/2/2024 for nausea, vomiting, diarrhea and worsening pain of right diabetic foot wound.  Patient was recently seen in our ED on 7/21/2024 with complaints of hallucinations, fever, and right foot pain.  At this time, patient was diagnosed with right heel ulcer and UTI.  She was sent home with an antibiotic but was unsure of the name.  Patient reported she started having nausea and vomiting after taking the antibiotics so she stopped taking them.  However, she continued to have nausea and vomiting.       Patient was tachycardic in the ED.Labs show K+ at upper limit of normal, BUN 30, cr 4.36, glucose 176, alk phos 323 and albumin 2.9, otherwise normal LFTs. CRP mildly elevated at 2.34, while lactate and WBC are normal. UA shows large leuk esterase and unable to determine RBC, WBC, bacteria of squamous cells due to loaded field.  Patient's urine culture from 7/21/2024 grew E. coli.  She had also been following with wound care who it obtained a wound culture on  the same day.  This culture finalized with mixed gram-negative maurisio and light growth of Enterococcus faecalis.       X-ray of the right foot performed on 8/2/2024 revealed partial amputation of the fifth metatarsal. Bony demineralization. No acute fracture or dislocation. No lytic or blastic bony lesions seen. Diffuse interphalangeal joint space loss. Mild degenerative changes in the midfoot. No cortical erosion. Fixation timur in the tibia/talus/calcaneus noted. Diffuse soft tissue swelling. Vascular calcifications. No soft tissue gas.  CT abdomen/pelvis revealed peritoneal dialysis catheter noted in the anterior right abdominal wall with the catheter noted to terminate in the left lateral pelvis. Small volume of free fluid in the right upper quadrant and lower posterior pelvis and right lower quadrant. Slightly elevated left hemidiaphragm. No bowel or renal obstruction. No abdominal aortic aneurysm. No features of acute appendicitis. Stranding identified in the presacral space could present changes related to mild distal colitis. Fluid-filled bladder with small volume of air in the bladder lumen. No pneumatosis. No conclusive features of free air. Distended configuration to the gallbladder likely due to fasting. No conclusive features of gastritis or enteritis.       Patient continued to have nausea and vomiting after 2 doses of Zofran.  She was admitted for further treatment.  Subsequently, patient was started on IV Rocephin in the setting of E. coli UTI.  Flagyl was added to cover possible colitis.  Patient was also started on IV vancomycin for Enterococcus faecalis wound infection.  General surgery consulted for possible debridement of patient's wound.     Assessment/Plan       Assessment     R foot wound infection, Enterococcus faecalis  Possible osteomyelitis of R foot  Hardware present in RLE  E. Coli UTI  Possible colitis         Plan      The patient is awake and alert, resting in bed.  Complains of nausea  but denies any vomiting.  On room air with no apparent distress.  Denies diarrhea.  Afebrile.  Lung exam is clear to auscultation bilaterally.  Abdomen is soft and nontender with normoactive bowel sounds primary RN at the bedside reported that the patient is scheduled for surgery today for AKA.  WBC normal at 5.34.  Right foot wound culture from 8/3 finalized with moderate growth mixed gram-negative maurisio and Enterococcus faecalis.    Continue with vancomycin and ceftriaxone courses in the setting of E. coli UTI as well as gram-negative maurisio and a faecalis on wound culture.  Scheduled for surgery today for AKA.  If AKA is not performed with removal of hardware, infection will persist due to the presence of hardware.  The patient may require lifelong antibiotic therapy.  We will continue to monitor closely and adjust antibiotic coverage as appropriate.    ANTIMICROBIAL THERAPY    cefdinir - 300 MG  cefTRIAXone (ROCEPHIN) 1000 mg IVPB in 100 mL NS (VTB)  Vancomycin Pharmacy Intermittent/Pulse Dosing     Code Status:   Code Status and Medical Interventions: No CPR (Do Not Attempt to Resuscitate); Limited Support; No intubation (DNI)   Ordered at: 08/04/24 3085     Medical Intervention Limits:    No intubation (DNI)     Code Status (Patient has no pulse and is not breathing):    No CPR (Do Not Attempt to Resuscitate)     Medical Interventions (Patient has pulse or is breathing):    Limited Support       DARIUS Pike  08/09/24  10:22 EDT

## 2024-08-09 NOTE — PROGRESS NOTES
[facility]     Patient Identification:  Name:  Reny Elena  Age:  64 y.o.  Sex:  female  :  1960  MRN:  [id number]  Visit Number:  [id number]  Primary Care Provider:  Susan Stephens APRN    Length of stay:  6    Subjective: Patient seen and examined assisted by GLORIA Ortiz.  Patient had right above-knee amputation, still sedated.  Vital signs stable    Chief Complaint: Right heel ulcer  ----------------------------------------------------------------------------------------------------------------------  Current Hospital Meds:  [Transfer Hold] aspirin, 81 mg, Oral, Daily  [Transfer Hold] atorvastatin, 80 mg, Oral, Nightly  cefTRIAXone, 1,000 mg, Intravenous, Q24H  [Transfer Hold] ferrous sulfate, 325 mg, Oral, Daily With Breakfast  [Transfer Hold] FLUoxetine, 60 mg, Oral, Daily  [Transfer Hold] folic acid, 1 mg, Oral, Daily  [Transfer Hold] heparin (porcine), 5,000 Units, Subcutaneous, Q12H  lacosamide, 50 mg, Oral, Q12H  lactated ringers, 125 mL/hr, Intravenous, Once  [Transfer Hold] levothyroxine, 100 mcg, Oral, QAM AC  losartan, 50 mg, Oral, Daily  NIFEdipine XL, 90 mg, Oral, Daily  [Transfer Hold] pantoprazole, 40 mg, Oral, QAM AC  [Transfer Hold] rOPINIRole, 0.5 mg, Oral, BID  [Transfer Hold] sodium chloride, 10 mL, Intravenous, Q12H  sodium chloride, 10 mL, Intravenous, Q12H  [Transfer Hold] traZODone, 50 mg, Oral, Nightly  Vancomycin Pharmacy Intermittent/Pulse Dosing, , Does not apply, Daily         ----------------------------------------------------------------------------------------------------------------------  Vital Signs:  Temp:  [97.7 °F (36.5 °C)-98.7 °F (37.1 °C)] 97.7 °F (36.5 °C)  Heart Rate:  [] 101  Resp:  [18-19] 19  BP: (137-153)/(67-79) 153/79       Tele:       24  0500 24  0500 24  0600   Weight: 49.9 kg (109 lb 14.4 oz) 45.3 kg (99 lb 14.4 oz) (rn rochella aware) 45.2 kg (99 lb 10.4 oz)     Body mass index is  17.1 kg/m².    Intake/Output Summary (Last 24 hours) at 8/9/2024 1444  Last data filed at 8/9/2024 1350  Gross per 24 hour   Intake 220 ml   Output 838 ml   Net -618 ml     NPO Diet NPO Type: Sips with Meds  ----------------------------------------------------------------------------------------------------------------------  Physical exam:  General: Still drowsy, chronically ill-appearing, c  No respiratory distress.    Skin:  Skin is warm and dry. No rash noted. No pallor.    HENT:  Head:  Normocephalic and atraumatic.  Mouth:  Moist mucous membranes.    Eyes:  Conjunctivae and EOM are normal.  Pupils are equal, round, and reactive to light.  No scleral icterus.    Neck:  Neck supple.  No JVD present.  No hepatojugular reflux  Pulmonary/Chest:  No respiratory distress, no wheezes, no crackles, with normal breath sounds and good air movement.  Cardiovascular:  Normal rate, regular rhythm and normal heart sounds with no murmur.  Abdominal:  Soft.  Bowel sounds are normal.  No distension and no tenderness.   Extremities: Right below-knee amputation stump with dressing no bleeding or drainage, left lower extremity some trace edema with loss of muscle bulk, pedal pulses palpable.   Neurological: She is currently sedated she moves in response to tactile stimuli.    Genitourinary: No Doshi catheter  Back:  ----------------------------------------------------------------------------------------------------------------------  ----------------------------------------------------------------------------------------------------------------------  Results from last 7 days   Lab Units 08/06/24  2031 08/06/24  1817   HSTROP T ng/L 121* 124*     Results from last 7 days   Lab Units 08/09/24  0049 08/07/24  0047 08/06/24  0229 08/04/24  0300 08/04/24  0216 08/03/24  0534 08/02/24  1916   CRP mg/dL  --   --   --   --  1.86* 1.83* 2.25*  2.34*   LACTATE mmol/L  --   --   --   --   --   --  1.1   WBC 10*3/mm3 5.34 7.20 10.55   < >   "--  9.04 9.44   HEMOGLOBIN g/dL 9.5* 9.3* 9.5*   < >  --  10.7* 13.0   HEMATOCRIT % 31.6* 30.6* 30.1*   < >  --  34.4 40.5   MCV fL 102.3* 101.7* 99.0*   < >  --  98.6* 96.2   MCHC g/dL 30.1* 30.4* 31.6   < >  --  31.1* 32.1   PLATELETS 10*3/mm3 149 172 178   < >  --  185 251   INR  0.96  --   --   --   --   --   --     < > = values in this interval not displayed.         Results from last 7 days   Lab Units 08/09/24  0049 08/07/24  0047 08/06/24  0229 08/05/24  0223 08/04/24  0216 08/03/24  0534   SODIUM mmol/L 136 140 136 138   < > 137   POTASSIUM mmol/L 4.0 4.3 5.1 3.8   < > 4.7   CHLORIDE mmol/L 103 109* 107 103   < > 100   CO2 mmol/L 19.6* 19.0* 17.8* 20.6*   < > 23.0   BUN mg/dL 18 20 21 21   < > 31*   CREATININE mg/dL 3.05* 3.12* 3.19* 3.19*   < > 4.26*   CALCIUM mg/dL 6.9* 6.7* 6.6* 6.9*   < > 6.8*   GLUCOSE mg/dL 173* 174* 105* 130*   < > 119*   ALBUMIN g/dL  --   --  1.9* 2.2*  --  2.3*   BILIRUBIN mg/dL  --   --  0.2 0.2  --  0.2   ALK PHOS U/L  --   --  189* 205*  --  232*   AST (SGOT) U/L  --   --  16 15  --  11   ALT (SGPT) U/L  --   --  6 9  --  8    < > = values in this interval not displayed.   Estimated Creatinine Clearance: 12.9 mL/min (A) (by C-G formula based on SCr of 3.05 mg/dL (H)).    No results found for: \"AMMONIA\"      Blood Culture   Date Value Ref Range Status   08/02/2024 No growth at 5 days  Final   08/02/2024 No growth at 5 days  Final     Urine Culture   Date Value Ref Range Status   08/03/2024 25,000 CFU/mL Escherichia coli (A)  Final     Wound Culture   Date Value Ref Range Status   08/03/2024 Moderate growth (3+) Mixed Gram Negative Vannessa (A)  Final   08/03/2024 Moderate growth (3+) Enterococcus faecalis (A)  Final     No results found for: \"STOOLCX\"    I have personally looked at the labs and they are summarized above.  ----------------------------------------------------------------------------------------------------------------------  Imaging Results (Last 24 Hours)       ** " No results found for the last 24 hours. **          ----------------------------------------------------------------------------------------------------------------------  Assessment and Plan:  -Right heel ulcer with possible osteomyelitis in faecalis on culture  -UTI E. Coli  -Possible mild distal colitis denies diarrhea  -End-stage renal disease on peritoneal dialysis  -Diabetes type 2 insulin requiring, Accu-Chek results at goal  -Severe malnutrition  -History of anxiety and depression  -History of essential hypertension  -History of hypothyroidism    Postop care, incentive spirometry, wound care, PT OT, continue Accu-Chek and sliding scale, infectious disease help appreciated.      Disposition patient may benefit skilled nursing facility postop      Rebeka Woodruff MD  08/09/24  14:44 EDT

## 2024-08-09 NOTE — ANESTHESIA POSTPROCEDURE EVALUATION
Patient: Reny Elena    Procedure Summary       Date: 08/09/24 Room / Location: [facility] OR  /  [facility] OR    Anesthesia Start: 1350 Anesthesia Stop: 1533    Procedure: AMPUTATION ABOVE KNEE (Right: Leg Lower) Diagnosis:       Diabetic ulcer of right heel associated with type 2 diabetes mellitus, with bone involvement without evidence of necrosis      (Diabetic ulcer of right heel associated with type 2 diabetes mellitus, with bone involvement without evidence of necrosis [E11.621, L97.416])    Surgeons: Angelo Santoro MD Provider: Jasvir Haywood DO    Anesthesia Type: general ASA Status: 3            Anesthesia Type: general    Vitals  Vitals Value Taken Time   /62 08/09/24 1604   Temp 97.2 °F (36.2 °C) 08/09/24 1604   Pulse 94 08/09/24 1606   Resp 20 08/09/24 1604   SpO2 100 % 08/09/24 1606   Vitals shown include unfiled device data.        Post Anesthesia Care and Evaluation    Patient location during evaluation: PHASE II  Patient participation: complete - patient participated  Level of consciousness: awake and alert  Pain score: 1  Pain management: adequate    Airway patency: patent  Anesthetic complications: No anesthetic complications  PONV Status: controlled  Cardiovascular status: acceptable  Respiratory status: acceptable  Hydration status: acceptable

## 2024-08-09 NOTE — SIGNIFICANT NOTE
08/09/24 0829   Rehab Time/Intention   Session Not Performed patient unavailable for evaluation  (F/u this am. Pt currently NPO for surgery today. SLP to continue to f/u for diet safety/assessment.)   Recommendations   SLP - Next Appointment 08/12/24

## 2024-08-09 NOTE — ANESTHESIA PREPROCEDURE EVALUATION
Anesthesia Evaluation     Patient summary reviewed and Nursing notes reviewed   no history of anesthetic complications:   NPO Solid Status: > 8 hours  NPO Liquid Status: > 8 hours           Airway   Mallampati: II  TM distance: >3 FB  Neck ROM: full  Dental - normal exam     Pulmonary - negative pulmonary ROS and normal exam   Cardiovascular     ECG reviewed  Rhythm: regular  Rate: normal    (+) hypertension well controlled, CHF , PVD, hyperlipidemia      Neuro/Psych  (+) CVA, psychiatric history Depression  GI/Hepatic/Renal/Endo    (+) GERD, renal disease- ESRD and dialysis, diabetes mellitus type 2 poorly controlled using insulin    Musculoskeletal     Abdominal  - normal exam   Substance History - negative use     OB/GYN negative ob/gyn ROS         Other   arthritis,     ROS/Med Hx Other: · The left ventricular cavity is small in size.  · Estimated left ventricular EF = 60% Left ventricular systolic function is normal.  · Left ventricular wall thickness is consistent with mild to moderate concentric hypertrophy.  · Left ventricular diastolic function is consistent with (grade I) impaired relaxation.  · Estimated right ventricular systolic pressure from tricuspid regurgitation is normal (<35 mmHg).  · The valvular regurgitations noticed in the study are within normal limits                         Anesthesia Plan    ASA 3     general     intravenous induction     Anesthetic plan, risks, benefits, and alternatives have been provided, discussed and informed consent has been obtained with: patient.

## 2024-08-09 NOTE — PROGRESS NOTES
"Nephrology Progress Note      Subjective     Doing well overall, no new complaints.  Denied any chest pain or shortness of breath    Objective       Vital signs :     Temp:  [97.7 °F (36.5 °C)-98.7 °F (37.1 °C)] 98.7 °F (37.1 °C)  Heart Rate:  [] 96  Resp:  [18] 18  BP: (137-151)/(67-75) 151/71    Intake/Output                               08/07/24 0701 - 08/08/24 0700 08/08/24 0701 - 08/09/24 0700 08/09/24 0701 - 08/10/24 0700     9835-6910 4550-8322 Total 3054-3252 5214-3550 Total 4203-0482 2196-2709 Total                    Intake    P.O.  480  240 720  120  120 240  0  -- 0    I.V.  350  -- 350  --  -- --  --  -- --    Total Intake  120 120 240 0 -- 0       Output    Dialysis  827  -- 827  --  -- --  838  -- 838    Total Output 827 -- 827 -- -- -- 838 -- 838             Physical Exam:    General Appearance : alert, pleasant, appears stated age, cooperative and oriented  Lungs : clear to auscultation, respirations regular  Heart :  regular rhythm & normal rate, normal S1, S2 and no murmur, no rub  Abdomen : Soft, nondistended  Extremities : No edema,   Neurologic :   orientated to person, place, time and situation, Grossly no focal deficits        Laboratory Data :     Albumin No results found for: \"ALBUMIN\"     Magnesium No results found for: \"MG\"       PTH               No results found for: \"PTH\"    CBC and coagulation:  Results from last 7 days   Lab Units 08/09/24  0049 08/07/24  0047 08/06/24  0229 08/04/24  0300 08/04/24  0216 08/03/24  0534 08/02/24  1916   LACTATE mmol/L  --   --   --   --   --   --  1.1   CRP mg/dL  --   --   --   --  1.86* 1.83* 2.25*  2.34*   WBC 10*3/mm3 5.34 7.20 10.55   < >  --  9.04 9.44   HEMOGLOBIN g/dL 9.5* 9.3* 9.5*   < >  --  10.7* 13.0   HEMATOCRIT % 31.6* 30.6* 30.1*   < >  --  34.4 40.5   MCV fL 102.3* 101.7* 99.0*   < >  --  98.6* 96.2   MCHC g/dL 30.1* 30.4* 31.6   < >  --  31.1* 32.1   PLATELETS 10*3/mm3 149 172 178   < >  --  185 251   INR  0.96  -- "   --   --   --   --   --     < > = values in this interval not displayed.     Acid/base balance:      Renal and electrolytes:    Results from last 7 days   Lab Units 08/09/24  0049 08/07/24  0047 08/06/24 0229 08/05/24 0223 08/04/24  0216   SODIUM mmol/L 136 140 136 138 137   POTASSIUM mmol/L 4.0 4.3 5.1 3.8 4.2   CHLORIDE mmol/L 103 109* 107 103 104   CO2 mmol/L 19.6* 19.0* 17.8* 20.6* 19.1*   BUN mg/dL 18 20 21 21 27*   CREATININE mg/dL 3.05* 3.12* 3.19* 3.19* 3.80*   CALCIUM mg/dL 6.9* 6.7* 6.6* 6.9* 6.4*     Estimated Creatinine Clearance: 12.9 mL/min (A) (by C-G formula based on SCr of 3.05 mg/dL (H)).  @GFRCG:3@   Liver and pancreatic function:  Results from last 7 days   Lab Units 08/06/24 0229 08/05/24 0223 08/03/24  0534 08/02/24  1916   ALBUMIN g/dL 1.9* 2.2* 2.3* 2.9*   BILIRUBIN mg/dL 0.2 0.2 0.2 0.4   ALK PHOS U/L 189* 205* 232* 323*   AST (SGOT) U/L 16 15 11 18   ALT (SGPT) U/L 6 9 8 11   LIPASE U/L  --   --   --  25         Cardiac:      Liver and pancreatic function:  Results from last 7 days   Lab Units 08/06/24 0229 08/05/24 0223 08/03/24  0534 08/02/24  1916   ALBUMIN g/dL 1.9* 2.2* 2.3* 2.9*   BILIRUBIN mg/dL 0.2 0.2 0.2 0.4   ALK PHOS U/L 189* 205* 232* 323*   AST (SGOT) U/L 16 15 11 18   ALT (SGPT) U/L 6 9 8 11   LIPASE U/L  --   --   --  25       Medications :     aspirin, 81 mg, Oral, Daily  atorvastatin, 80 mg, Oral, Nightly  cefTRIAXone, 1,000 mg, Intravenous, Q24H  ferrous sulfate, 325 mg, Oral, Daily With Breakfast  FLUoxetine, 60 mg, Oral, Daily  folic acid, 1 mg, Oral, Daily  heparin (porcine), 5,000 Units, Subcutaneous, Q12H  lacosamide, 50 mg, Oral, Q12H  levothyroxine, 100 mcg, Oral, QAM AC  losartan, 50 mg, Oral, Daily  NIFEdipine XL, 90 mg, Oral, Daily  pantoprazole, 40 mg, Oral, QAM AC  rOPINIRole, 0.5 mg, Oral, BID  sodium chloride, 10 mL, Intravenous, Q12H  traZODone, 50 mg, Oral, Nightly  Vancomycin Pharmacy Intermittent/Pulse Dosing, , Does not apply, Daily                Assessment & Plan     -End-stage renal disease on peritoneal dialysis  - Type 2 diabetes mellitus with renal involvement  - Persistent nausea and vomiting  - Cachexia and malnutrition    Reviewed peritoneal dialysis data hydration adequate ultrafiltration.  Continue on current CCPD with 1.5% dialysate    Significant protein calorie malnutrition, educated and counseled the patient and to continue on Nepro 3 times a day     Nicholas Arroyo MD  08/09/24  09:20 EDT

## 2024-08-09 NOTE — CASE MANAGEMENT/SOCIAL WORK
Discharge Planning Assessment  [facility]     Patient Name: Reny Elena  MRN: [id number]  Today's Date: 8/9/2024    Admit Date: 8/2/2024    Plan: Pt was admitted on 8/2/24. Pt is down in surgery on this date. Pt lives with significant other. Pt is current with [facility]. Per Geraldine, pt is current with their services and receives skilled nursing. Per Geraldine, pt will require resumption orders faxed at discharge but will need a new face to face if pt has new orders for home health. Pt has wheelchair, bedside commode, glucometer, bp cuff, shower chair, rollator, cane, standard walker via [facility]. Pt requests Hospital bed at discharge. Pt plans to return home at discahrge with family to provide transportation. SS to follow.       Discharge Plan       Row Name 08/09/24 1438       Plan    Plan Pt was admitted on 8/2/24. Pt is down in surgery on this date. Pt lives with significant other. Pt is current with [facility]. Per Geraldine, pt is current with their services and receives skilled nursing. Per Geraldine, pt will require resumption orders faxed at discharge but will need a new face to face if pt has new orders for home health. Pt has wheelchair, bedside commode, glucometer, bp cuff, shower chair, rollator, cane, standard walker via [facility]. Pt requests Hospital bed at discharge. Pt plans to return home at discahrge with family to provide transportation. SS to follow.        Expected Discharge Date and Time       Expected Discharge Date Expected Discharge Time    Aug 12, 2024               CAITY Julian

## 2024-08-09 NOTE — PLAN OF CARE
Goal Outcome Evaluation:                 Pt resting in bed at this time. Pt had above knee amputation this shift. VSS. Dressing dry and intact. No other complaints at this time.

## 2024-08-09 NOTE — PROGRESS NOTES
Antimicrobial Length of Therapy:    Day 7 of 8 vancomycin IV  Day 8 of 9 ceftriaxone    Thank you.  Amy Hairston, Pharm.D.  8/9/2024  09:30 EDT

## 2024-08-09 NOTE — PLAN OF CARE
Goal Outcome Evaluation:  Plan of Care Reviewed With: patient        Progress: no change  Outcome Evaluation: Pt resting in bed this shift. Wound care completed per orders. Pain meds given this shift, intervention successful. VSS on RA. No acute changes or complaints at this time. Will continue POC.

## 2024-08-09 NOTE — OP NOTE
OPERATIVE NOTE    Patient Name:  Reny Elena  YOB: 1960  [id number]    8/9/2024        PREOPERATIVE DIAGNOSIS: Right heel osteomyelitis, nonambulatory, indwelling lower leg hardware      POSTOPERATIVE DIAGNOSIS: Same       PROCEDURE PERFORMED: Right above-knee amputation       SURGEON: Angelo Santoro MD       SPECIMENS: Right leg       ANESTHESIA: General.  Local      GRAFTS/IMPLANTS: Bone wax, Kevin    FINDINGS:   1.  Right above-knee amputation       INDICATIONS:    The patient is a 64 y.o. chronically debilitated female that is nonambulatory.  She has developed pressure ulcer on her right heel with exposed bone.  She has prior hardware in her lower leg.  Risks and benefits of right above-knee amputation discussed with the patient and her son.     DESCRIPTION OF PROCEDURE:      After obtaining informed consent, the patient was taken to the operating room and placed in the supine position.  General anesthesia was initiated.  SCDs were placed on the left leg t and turned on.  Preop antibiotics were administered.  Patient was prepped and draped in a sterile manner and a proper timeout was performed    The right lower extremity was exsanguinated with an Esmarch followed by insufflation of the tourniquet.  A fishmouth incision was made with a 15 blade scalpel.  Cautery was used dissect through the subcutaneous tissues and muscle.  Circumferential control around the femur at the level of the proximal aspect of the incision was obtained.  The femur was transected with an automatic bone saw.  Cautery was used to transect the muscle along the posterior aspect of the leg.  Neurovascular bundles were suture-ligated and injected with local anesthetic.  The final structures to be clamped and suture-ligated with 0 silk suture was the neurovascular bundle associated with the SFA.  Hemostasis was achieved electrocautery.  The wound was irrigated with sterile saline and suctioned out.  The tourniquet was let  down and there was no active bleeding.  Bone wax was placed over the cut edge of the femur.  The edges of the femur were blunted with the automatic bone saw.  Kevin placed within the wound to minimize risk of perioperative fluid collection element.  Muscle was sutured overlying the SFA bundle and femur with a 0 Vicryl suture.  Superficial soft tissue was closed with a layer of interrupted 3-0 Vicryl sutures.  Skin closed with staples.  Compressive dressing placed    At the completion of the case, all needle, instrument and lap counts were correct.  Patient was awakened, extubated and taken to the postop anesthesia care unit for recovery.    EBL: 150 cc       Angelo Santoro MD  8/9/2024  15:19 EDT

## 2024-08-10 LAB
ANION GAP SERPL CALCULATED.3IONS-SCNC: 13 MMOL/L (ref 5–15)
BASOPHILS # BLD AUTO: 0.01 10*3/MM3 (ref 0–0.2)
BASOPHILS NFR BLD AUTO: 0.1 % (ref 0–1.5)
BUN SERPL-MCNC: 16 MG/DL (ref 8–23)
BUN/CREAT SERPL: 5.7 (ref 7–25)
CALCIUM SPEC-SCNC: 6.5 MG/DL (ref 8.6–10.5)
CHLORIDE SERPL-SCNC: 103 MMOL/L (ref 98–107)
CO2 SERPL-SCNC: 19 MMOL/L (ref 22–29)
CREAT SERPL-MCNC: 2.8 MG/DL (ref 0.57–1)
DEPRECATED RDW RBC AUTO: 50.2 FL (ref 37–54)
EGFRCR SERPLBLD CKD-EPI 2021: 18.1 ML/MIN/1.73
EOSINOPHIL # BLD AUTO: 0.03 10*3/MM3 (ref 0–0.4)
EOSINOPHIL NFR BLD AUTO: 0.4 % (ref 0.3–6.2)
ERYTHROCYTE [DISTWIDTH] IN BLOOD BY AUTOMATED COUNT: 14.1 % (ref 12.3–15.4)
GLUCOSE BLDC GLUCOMTR-MCNC: 131 MG/DL (ref 70–130)
GLUCOSE BLDC GLUCOMTR-MCNC: 191 MG/DL (ref 70–130)
GLUCOSE BLDC GLUCOMTR-MCNC: 225 MG/DL (ref 70–130)
GLUCOSE BLDC GLUCOMTR-MCNC: 234 MG/DL (ref 70–130)
GLUCOSE BLDC GLUCOMTR-MCNC: 318 MG/DL (ref 70–130)
GLUCOSE SERPL-MCNC: 256 MG/DL (ref 65–99)
HCT VFR BLD AUTO: 23.5 % (ref 34–46.6)
HGB BLD-MCNC: 7.4 G/DL (ref 12–15.9)
IMM GRANULOCYTES # BLD AUTO: 0.05 10*3/MM3 (ref 0–0.05)
IMM GRANULOCYTES NFR BLD AUTO: 0.6 % (ref 0–0.5)
LYMPHOCYTES # BLD AUTO: 1.1 10*3/MM3 (ref 0.7–3.1)
LYMPHOCYTES NFR BLD AUTO: 13.3 % (ref 19.6–45.3)
MCH RBC QN AUTO: 30.6 PG (ref 26.6–33)
MCHC RBC AUTO-ENTMCNC: 31.5 G/DL (ref 31.5–35.7)
MCV RBC AUTO: 97.1 FL (ref 79–97)
MONOCYTES # BLD AUTO: 0.76 10*3/MM3 (ref 0.1–0.9)
MONOCYTES NFR BLD AUTO: 9.2 % (ref 5–12)
NEUTROPHILS NFR BLD AUTO: 6.31 10*3/MM3 (ref 1.7–7)
NEUTROPHILS NFR BLD AUTO: 76.4 % (ref 42.7–76)
NRBC BLD AUTO-RTO: 0 /100 WBC (ref 0–0.2)
PLATELET # BLD AUTO: 193 10*3/MM3 (ref 140–450)
PMV BLD AUTO: 9.8 FL (ref 6–12)
POTASSIUM SERPL-SCNC: 3.2 MMOL/L (ref 3.5–5.2)
RBC # BLD AUTO: 2.42 10*6/MM3 (ref 3.77–5.28)
SODIUM SERPL-SCNC: 135 MMOL/L (ref 136–145)
VANCOMYCIN SERPL-MCNC: 20.8 MCG/ML (ref 5–40)
WBC NRBC COR # BLD AUTO: 8.26 10*3/MM3 (ref 3.4–10.8)

## 2024-08-10 PROCEDURE — 99232 SBSQ HOSP IP/OBS MODERATE 35: CPT | Performed by: NURSE PRACTITIONER

## 2024-08-10 PROCEDURE — 85025 COMPLETE CBC W/AUTO DIFF WBC: CPT | Performed by: HOSPITALIST

## 2024-08-10 PROCEDURE — 82948 REAGENT STRIP/BLOOD GLUCOSE: CPT

## 2024-08-10 PROCEDURE — 80202 ASSAY OF VANCOMYCIN: CPT | Performed by: SURGERY

## 2024-08-10 PROCEDURE — 25010000002 CEFTRIAXONE PER 250 MG: Performed by: NURSE PRACTITIONER

## 2024-08-10 PROCEDURE — 99232 SBSQ HOSP IP/OBS MODERATE 35: CPT | Performed by: HOSPITALIST

## 2024-08-10 PROCEDURE — 99024 POSTOP FOLLOW-UP VISIT: CPT | Performed by: SURGERY

## 2024-08-10 PROCEDURE — 80048 BASIC METABOLIC PNL TOTAL CA: CPT | Performed by: HOSPITALIST

## 2024-08-10 PROCEDURE — 25010000002 HEPARIN (PORCINE) PER 1000 UNITS: Performed by: SURGERY

## 2024-08-10 PROCEDURE — 63710000001 INSULIN LISPRO (HUMAN) PER 5 UNITS: Performed by: HOSPITALIST

## 2024-08-10 RX ORDER — POTASSIUM CHLORIDE 1500 MG/1
20 TABLET, EXTENDED RELEASE ORAL ONCE
Status: COMPLETED | OUTPATIENT
Start: 2024-08-10 | End: 2024-08-10

## 2024-08-10 RX ORDER — NICOTINE POLACRILEX 4 MG
15 LOZENGE BUCCAL
Status: DISCONTINUED | OUTPATIENT
Start: 2024-08-10 | End: 2024-08-10 | Stop reason: SDUPTHER

## 2024-08-10 RX ORDER — DEXTROSE MONOHYDRATE 25 G/50ML
25 INJECTION, SOLUTION INTRAVENOUS
Status: DISCONTINUED | OUTPATIENT
Start: 2024-08-10 | End: 2024-08-21

## 2024-08-10 RX ORDER — TRAZODONE HYDROCHLORIDE 50 MG/1
25 TABLET, FILM COATED ORAL NIGHTLY
Status: DISCONTINUED | OUTPATIENT
Start: 2024-08-10 | End: 2024-08-21 | Stop reason: HOSPADM

## 2024-08-10 RX ORDER — INSULIN LISPRO 100 [IU]/ML
2-7 INJECTION, SOLUTION INTRAVENOUS; SUBCUTANEOUS
Status: DISCONTINUED | OUTPATIENT
Start: 2024-08-10 | End: 2024-08-16

## 2024-08-10 RX ORDER — GABAPENTIN 100 MG/1
100 CAPSULE ORAL EVERY 12 HOURS SCHEDULED
Status: DISCONTINUED | OUTPATIENT
Start: 2024-08-10 | End: 2024-08-11

## 2024-08-10 RX ADMIN — ACETAMINOPHEN 1000 MG: 500 TABLET ORAL at 11:32

## 2024-08-10 RX ADMIN — TRAZODONE HYDROCHLORIDE 25 MG: 50 TABLET ORAL at 20:34

## 2024-08-10 RX ADMIN — ROPINIROLE HYDROCHLORIDE 0.5 MG: 0.25 TABLET, FILM COATED ORAL at 08:24

## 2024-08-10 RX ADMIN — Medication 10 ML: at 08:24

## 2024-08-10 RX ADMIN — FERROUS SULFATE TAB 325 MG (65 MG ELEMENTAL FE) 325 MG: 325 (65 FE) TAB at 08:23

## 2024-08-10 RX ADMIN — LOSARTAN POTASSIUM 50 MG: 50 TABLET, FILM COATED ORAL at 08:23

## 2024-08-10 RX ADMIN — GABAPENTIN 100 MG: 100 CAPSULE ORAL at 20:59

## 2024-08-10 RX ADMIN — ASPIRIN 81 MG: 81 TABLET, COATED ORAL at 08:19

## 2024-08-10 RX ADMIN — LEVOTHYROXINE SODIUM 100 MCG: 0.1 TABLET ORAL at 08:23

## 2024-08-10 RX ADMIN — HYDROXYZINE HYDROCHLORIDE 25 MG: 25 TABLET ORAL at 02:01

## 2024-08-10 RX ADMIN — LACOSAMIDE 50 MG: 50 TABLET, FILM COATED ORAL at 08:23

## 2024-08-10 RX ADMIN — GABAPENTIN 100 MG: 100 CAPSULE ORAL at 11:32

## 2024-08-10 RX ADMIN — NIFEDIPINE 90 MG: 30 TABLET, FILM COATED, EXTENDED RELEASE ORAL at 08:23

## 2024-08-10 RX ADMIN — ACETAMINOPHEN 1000 MG: 500 TABLET ORAL at 22:44

## 2024-08-10 RX ADMIN — HEPARIN SODIUM 5000 UNITS: 5000 INJECTION INTRAVENOUS; SUBCUTANEOUS at 20:34

## 2024-08-10 RX ADMIN — METHOCARBAMOL 750 MG: 750 TABLET ORAL at 08:19

## 2024-08-10 RX ADMIN — HEPARIN SODIUM 5000 UNITS: 5000 INJECTION INTRAVENOUS; SUBCUTANEOUS at 08:19

## 2024-08-10 RX ADMIN — CEFTRIAXONE 1000 MG: 1 INJECTION, POWDER, FOR SOLUTION INTRAMUSCULAR; INTRAVENOUS at 21:33

## 2024-08-10 RX ADMIN — INSULIN LISPRO 5 UNITS: 100 INJECTION, SOLUTION INTRAVENOUS; SUBCUTANEOUS at 11:33

## 2024-08-10 RX ADMIN — PANTOPRAZOLE SODIUM 40 MG: 40 TABLET, DELAYED RELEASE ORAL at 08:23

## 2024-08-10 RX ADMIN — FLUOXETINE HYDROCHLORIDE 60 MG: 20 CAPSULE ORAL at 08:19

## 2024-08-10 RX ADMIN — Medication 10 ML: at 20:35

## 2024-08-10 RX ADMIN — OXYCODONE HYDROCHLORIDE 5 MG: 5 TABLET ORAL at 08:20

## 2024-08-10 RX ADMIN — FOLIC ACID 1 MG: 1 TABLET ORAL at 08:23

## 2024-08-10 RX ADMIN — ACETAMINOPHEN 1000 MG: 500 TABLET ORAL at 05:27

## 2024-08-10 RX ADMIN — ATORVASTATIN CALCIUM 80 MG: 40 TABLET, FILM COATED ORAL at 20:34

## 2024-08-10 RX ADMIN — OXYCODONE HYDROCHLORIDE 5 MG: 5 TABLET ORAL at 01:57

## 2024-08-10 RX ADMIN — POTASSIUM CHLORIDE 20 MEQ: 1500 TABLET, EXTENDED RELEASE ORAL at 11:33

## 2024-08-10 RX ADMIN — LACOSAMIDE 50 MG: 50 TABLET, FILM COATED ORAL at 20:34

## 2024-08-10 RX ADMIN — ROPINIROLE HYDROCHLORIDE 0.5 MG: 0.25 TABLET, FILM COATED ORAL at 20:33

## 2024-08-10 RX ADMIN — OXYCODONE HYDROCHLORIDE 5 MG: 5 TABLET ORAL at 14:52

## 2024-08-10 NOTE — PLAN OF CARE
Goal Outcome Evaluation:  Plan of Care Reviewed With: patient        Progress: no change  Outcome Evaluation: Pt has rested in bed overnight. Surgical dressing CDI. VSS. Pain well controlled with PRN meds. No acute changes. Will continue POC.

## 2024-08-10 NOTE — PROGRESS NOTES
"Nephrology Progress Note      Subjective     08/10/2024 patient tolerated peritoneal dialysis had only 218 cc of ultrafiltration but clinically patient is euvolemic    Objective       Vital signs :     Temp:  [96.8 °F (36 °C)-98 °F (36.7 °C)] 97.5 °F (36.4 °C)  Heart Rate:  [] 87  Resp:  [14-20] 18  BP: (106-155)/(53-83) 122/67    Intake/Output                               08/08/24 0701 - 08/09/24 0700 08/09/24 0701 - 08/10/24 0700 08/10/24 0701 - 08/11/24 0700     3025-5148 7643-5785 Total 8595-9017 0280-4070 Total 6277-2804 1788-9000 Total                    Intake    P.O.  120  120 240  120  120 240  --  -- --    I.V.  --  -- --  250  -- 250  --  -- --    IV Piggyback  --  -- --  100  -- 100  --  -- --    Total Intake 120 120 240 470 120 590 -- -- --       Output    Dialysis  --  -- --  838  -- 838  218  -- 218    Total Output -- -- -- 838 -- 838 218 -- 218           08/10/2024 examined and reviewed    Physical Exam:    General Appearance : alert, pleasant, appears stated age, cooperative and oriented  Lungs : clear to auscultation, respirations regular  Heart :  regular rhythm & normal rate, normal S1, S2 and no murmur, no rub  Abdomen : Soft, nondistended PD catheter site clear  Extremities : No edema,   Neurologic :   orientated to person, place, time and situation, Grossly no focal deficits        Laboratory Data :     Albumin No results found for: \"ALBUMIN\"     Magnesium No results found for: \"MG\"       PTH               No results found for: \"PTH\"    CBC and coagulation:  Results from last 7 days   Lab Units 08/10/24  0528 08/09/24  0049 08/07/24  0047 08/04/24  0300 08/04/24  0216   CRP mg/dL  --   --   --   --  1.86*   WBC 10*3/mm3 8.26 5.34 7.20   < >  --    HEMOGLOBIN g/dL 7.4* 9.5* 9.3*   < >  --    HEMATOCRIT % 23.5* 31.6* 30.6*   < >  --    MCV fL 97.1* 102.3* 101.7*   < >  --    MCHC g/dL 31.5 30.1* 30.4*   < >  --    PLATELETS 10*3/mm3 193 149 172   < >  --    INR   --  0.96  --   --   --  "    < > = values in this interval not displayed.     Acid/base balance:      Renal and electrolytes:    Results from last 7 days   Lab Units 08/10/24  0406 08/09/24  0049 08/07/24  0047 08/06/24 0229 08/05/24  0223   SODIUM mmol/L 135* 136 140 136 138   POTASSIUM mmol/L 3.2* 4.0 4.3 5.1 3.8   CHLORIDE mmol/L 103 103 109* 107 103   CO2 mmol/L 19.0* 19.6* 19.0* 17.8* 20.6*   BUN mg/dL 16 18 20 21 21   CREATININE mg/dL 2.80* 3.05* 3.12* 3.19* 3.19*   CALCIUM mg/dL 6.5* 6.9* 6.7* 6.6* 6.9*     Estimated Creatinine Clearance: 13.1 mL/min (A) (by C-G formula based on SCr of 2.8 mg/dL (H)).  @GFRCG:3@   Liver and pancreatic function:  Results from last 7 days   Lab Units 08/06/24 0229 08/05/24  0223   ALBUMIN g/dL 1.9* 2.2*   BILIRUBIN mg/dL 0.2 0.2   ALK PHOS U/L 189* 205*   AST (SGOT) U/L 16 15   ALT (SGPT) U/L 6 9         Cardiac:      Liver and pancreatic function:  Results from last 7 days   Lab Units 08/06/24 0229 08/05/24 0223   ALBUMIN g/dL 1.9* 2.2*   BILIRUBIN mg/dL 0.2 0.2   ALK PHOS U/L 189* 205*   AST (SGOT) U/L 16 15   ALT (SGPT) U/L 6 9       Medications :     acetaminophen, 1,000 mg, Oral, Q6H  aspirin, 81 mg, Oral, Daily  atorvastatin, 80 mg, Oral, Nightly  cefTRIAXone, 1,000 mg, Intravenous, Q24H  ferrous sulfate, 325 mg, Oral, Daily With Breakfast  FLUoxetine, 60 mg, Oral, Daily  folic acid, 1 mg, Oral, Daily  heparin (porcine), 5,000 Units, Subcutaneous, Q12H  lacosamide, 50 mg, Oral, Q12H  levothyroxine, 100 mcg, Oral, QAM AC  losartan, 50 mg, Oral, Daily  methocarbamol, 750 mg, Oral, 4x Daily  NIFEdipine XL, 90 mg, Oral, Daily  pantoprazole, 40 mg, Oral, QAM AC  rOPINIRole, 0.5 mg, Oral, BID  sodium chloride, 10 mL, Intravenous, Q12H  traZODone, 50 mg, Oral, Nightly  Vancomycin Pharmacy Intermittent/Pulse Dosing, , Does not apply, Daily  Vancomycin Pharmacy Intermittent/Pulse Dosing, , Does not apply, Daily               Assessment & Plan     08/10/20/2024 reviewed and updated    -End-stage renal  disease on peritoneal dialysis  -Anemia  -Hypokalemia  -Type 2 diabetes mellitus with renal involvement  - Persistent nausea and vomiting  - Cachexia and malnutrition    08/10/2024  Patient had only 200 cc of ultrafiltration with peritoneal dialysis will continue low-dose 1.5% dialysis solution today also  Significant protein calorie malnutrition, educated and counseled the patient and to continue on Nepro 3 times a day   Will replace potassium  Will check iron studies as the patient has anemia    Prognosis guarded    Discussed with RN    Please avoid nephrotoxic medication and pharmacy to adjust the medication according to the GFR.      Plan of care was discussed with the patient. Patient understood, verbalized, agreed.      S Crispin Burks MD  08/10/24  09:05 EDT

## 2024-08-10 NOTE — PROGRESS NOTES
Chief complaint: Nonhealing foot wound    Postoperative day 1: Right above-the-knee amputation    No acute events overnight.  Patient doing well this morning.  Dressing clean dry and intact    64-year-old female status post right above-the-knee amputation due to nonhealing foot wound, bedbound status, indwelling hardware in the lower extremity necessitating above-knee amputation.    -Continue current management  -Will assess wound tomorrow at the bedside

## 2024-08-10 NOTE — PLAN OF CARE
Goal Outcome Evaluation:  Plan of Care Reviewed With: patient           Outcome Evaluation: Patient rested in bed during shift. Surgical dressing in place. Wound care done per orders. PRN pain medication given. Patient has no complaints or concerns at this time. WIll continue with plan of care.

## 2024-08-10 NOTE — PROGRESS NOTES
[facility] HOSPITALIST PROGRESS NOTE     Patient Identification:  Name:  Reny Elena  Age:  64 y.o.  Sex:  female  :  1960  MRN:  [id number]  Visit Number:  [id number]  Primary Care Provider:  Susan Stehpens APRN    Length of stay:  7    Subjective: Patient seen and examined assisted by GLORIA Overton.  Patient currently is eating breakfast she requested for help, she reports that she has problem doing the food she is edentulous.  She reports pain of her leg and she still thinks that she has her right foot although she is aware that she has already been amputated.    Chief Complaint: Right foot pain  ----------------------------------------------------------------------------------------------------------------------  Current Hospital Meds:  acetaminophen, 1,000 mg, Oral, Q6H  aspirin, 81 mg, Oral, Daily  atorvastatin, 80 mg, Oral, Nightly  cefTRIAXone, 1,000 mg, Intravenous, Q24H  ferrous sulfate, 325 mg, Oral, Daily With Breakfast  FLUoxetine, 60 mg, Oral, Daily  folic acid, 1 mg, Oral, Daily  heparin (porcine), 5,000 Units, Subcutaneous, Q12H  insulin lispro, 2-7 Units, Subcutaneous, 4x Daily AC & at Bedtime  lacosamide, 50 mg, Oral, Q12H  levothyroxine, 100 mcg, Oral, QAM AC  losartan, 50 mg, Oral, Daily  methocarbamol, 750 mg, Oral, 4x Daily  NIFEdipine XL, 90 mg, Oral, Daily  pantoprazole, 40 mg, Oral, QAM AC  potassium chloride, 20 mEq, Oral, Once  rOPINIRole, 0.5 mg, Oral, BID  sodium chloride, 10 mL, Intravenous, Q12H  traZODone, 50 mg, Oral, Nightly  Vancomycin Pharmacy Intermittent/Pulse Dosing, , Does not apply, Daily  Vancomycin Pharmacy Intermittent/Pulse Dosing, , Does not apply, Daily         ----------------------------------------------------------------------------------------------------------------------  Vital Signs:  Temp:  [96.8 °F (36 °C)-98 °F (36.7 °C)] 97.5 °F (36.4 °C)  Heart Rate:  [] 87  Resp:  [14-20] 18  BP: (106-155)/(53-83) 122/67       Tele:        08/08/24  0500 08/09/24  0600 08/10/24  0500   Weight: 45.3 kg (99 lb 14.4 oz) (rn macario landis) 45.2 kg (99 lb 10.4 oz) 41.9 kg (92 lb 6 oz)     Body mass index is 15.86 kg/m².    Intake/Output Summary (Last 24 hours) at 8/10/2024 1048  Last data filed at 8/10/2024 0905  Gross per 24 hour   Intake 710 ml   Output 218 ml   Net 492 ml     Diet: Renal, Diabetic; Consistent Carbohydrate; Low Sodium (2-3g), Low Potassium; Fluid Consistency: Thin (IDDSI 0)  ----------------------------------------------------------------------------------------------------------------------  Physical exam:  General: Comfortable,awake, alert, oriented to self, place, and time, chronically ill-appearing very pleasant and conversant, malnourished. No respiratory distress.    Skin:  Skin is warm and dry. No rash noted. No pallor.    HENT: Edentulous, head:  Normocephalic and atraumatic.  Mouth:  Moist mucous membranes.    Eyes:  Conjunctivae and EOM are normal.  Pupils are equal, round, and reactive to light.  No scleral icterus.    Neck:  Neck supple.  No JVD present.    Pulmonary/Chest:  No respiratory distress, no wheezes, no crackles, with normal breath sounds and good air movement.  Cardiovascular:  Normal rate, regular rhythm and normal heart sounds with no murmur.  Abdominal: PD catheter in place soft.  Bowel sounds are normal.  No distension and no tenderness.   Extremities: +1-2 edema both lower extremity, loss of muscle bulk, right above-knee with dressing no bleeding noted left pedal pulses strong   neurological:  Motor strength equal no obvious deficit, sensory grossly intact.   No cranial nerve deficit.  No tongue deviation.  No facial droop.  No slurred speech.    Genitourinary: No Doshi  "catheter  Back:  ----------------------------------------------------------------------------------------------------------------------  ----------------------------------------------------------------------------------------------------------------------  Results from last 7 days   Lab Units 08/06/24 2031 08/06/24  1817   HSTROP T ng/L 121* 124*     Results from last 7 days   Lab Units 08/10/24  0528 08/09/24 0049 08/07/24 0047 08/04/24 0300 08/04/24 0216   CRP mg/dL  --   --   --   --  1.86*   WBC 10*3/mm3 8.26 5.34 7.20   < >  --    HEMOGLOBIN g/dL 7.4* 9.5* 9.3*   < >  --    HEMATOCRIT % 23.5* 31.6* 30.6*   < >  --    MCV fL 97.1* 102.3* 101.7*   < >  --    MCHC g/dL 31.5 30.1* 30.4*   < >  --    PLATELETS 10*3/mm3 193 149 172   < >  --    INR   --  0.96  --   --   --     < > = values in this interval not displayed.         Results from last 7 days   Lab Units 08/10/24  0406 08/09/24  0049 08/07/24  0047 08/06/24  0229 08/05/24  0223   SODIUM mmol/L 135* 136 140 136 138   POTASSIUM mmol/L 3.2* 4.0 4.3 5.1 3.8   CHLORIDE mmol/L 103 103 109* 107 103   CO2 mmol/L 19.0* 19.6* 19.0* 17.8* 20.6*   BUN mg/dL 16 18 20 21 21   CREATININE mg/dL 2.80* 3.05* 3.12* 3.19* 3.19*   CALCIUM mg/dL 6.5* 6.9* 6.7* 6.6* 6.9*   GLUCOSE mg/dL 256* 173* 174* 105* 130*   ALBUMIN g/dL  --   --   --  1.9* 2.2*   BILIRUBIN mg/dL  --   --   --  0.2 0.2   ALK PHOS U/L  --   --   --  189* 205*   AST (SGOT) U/L  --   --   --  16 15   ALT (SGPT) U/L  --   --   --  6 9   Estimated Creatinine Clearance: 13.1 mL/min (A) (by C-G formula based on SCr of 2.8 mg/dL (H)).    No results found for: \"AMMONIA\"      No results found for: \"BLOODCX\"  No results found for: \"URINECX\"  Wound Culture   Date Value Ref Range Status   08/03/2024 Moderate growth (3+) Mixed Gram Negative Vannessa (A)  Final   08/03/2024 Moderate growth (3+) Enterococcus faecalis (A)  Final     No results found for: \"STOOLCX\"    I have personally looked at the labs and they are " "summarized above.  ----------------------------------------------------------------------------------------------------------------------  Imaging Results (Last 24 Hours)       ** No results found for the last 24 hours. **          ----------------------------------------------------------------------------------------------------------------------  Assessment and Plan:  -Status post above-knee amputation for right heel ulcer with osteomyelitis in the presence of hardware necessitating AKA  -Possible mild distal colitis patient otherwise is asymptomatic  -End-stage renal disease on peritoneal dialysis  -Diabetes type 2 with hyperglycemia, will change diet to diabetic diet and sliding scale insulin  -Severe malnutrition  -History of essential hypertension  -History of hypothyroidism  -Anxiety and depression  -Chronic normocytic anemia    Incentive spirometry, trial of low-dose Neurontin for the pain which the patient reports she still feels that he has her right foot although she knows it is \"gone\".  Monitor H&H, may need transfusion if needed.      Disposition pending      Rebeka Woodruff MD  08/10/24  10:48 EDT  "

## 2024-08-10 NOTE — PROGRESS NOTES
Pharmacokinetics Service Note:   Patient is on day 8 of vancomycin therapy for a right foot wound infection. Random vancomycin level was reported as 20.8 mcg/mL on 8/10. Based on the level the patient will not received a dose of vancomycin today. Will plan to check a level in the AM of 8/11 and will dose again when appropriate.      Thank you,    Kirk Thomas, PharmD  Pharmacy Resident  08/10/24  08:41 EDT

## 2024-08-11 ENCOUNTER — APPOINTMENT (OUTPATIENT)
Dept: GENERAL RADIOLOGY | Facility: HOSPITAL | Age: 64
DRG: 853 | End: 2024-08-11
Payer: MEDICARE

## 2024-08-11 ENCOUNTER — APPOINTMENT (OUTPATIENT)
Dept: ULTRASOUND IMAGING | Facility: HOSPITAL | Age: 64
DRG: 853 | End: 2024-08-11
Payer: MEDICARE

## 2024-08-11 ENCOUNTER — APPOINTMENT (OUTPATIENT)
Dept: CT IMAGING | Facility: HOSPITAL | Age: 64
DRG: 853 | End: 2024-08-11
Payer: MEDICARE

## 2024-08-11 LAB
A-A DO2: 4.7 MMHG (ref 0–300)
ABO GROUP BLD: NORMAL
ALBUMIN SERPL-MCNC: 1.6 G/DL (ref 3.5–5.2)
ALBUMIN SERPL-MCNC: 1.6 G/DL (ref 3.5–5.2)
ALBUMIN/GLOB SERPL: 0.8 G/DL
ALBUMIN/GLOB SERPL: 0.8 G/DL
ALP SERPL-CCNC: 181 U/L (ref 39–117)
ALP SERPL-CCNC: 182 U/L (ref 39–117)
ALT SERPL W P-5'-P-CCNC: 147 U/L (ref 1–33)
ALT SERPL W P-5'-P-CCNC: 197 U/L (ref 1–33)
AMMONIA BLD-SCNC: 22 UMOL/L (ref 11–51)
ANION GAP SERPL CALCULATED.3IONS-SCNC: 11.1 MMOL/L (ref 5–15)
ANION GAP SERPL CALCULATED.3IONS-SCNC: 13.6 MMOL/L (ref 5–15)
ARTERIAL PATENCY WRIST A: POSITIVE
AST SERPL-CCNC: 1377 U/L (ref 1–32)
AST SERPL-CCNC: 965 U/L (ref 1–32)
ATMOSPHERIC PRESS: 729 MMHG
BACTERIA UR QL AUTO: ABNORMAL /HPF
BASE EXCESS BLDA CALC-SCNC: -5.1 MMOL/L (ref 0–2)
BASOPHILS # BLD AUTO: 0.01 10*3/MM3 (ref 0–0.2)
BASOPHILS # BLD AUTO: 0.02 10*3/MM3 (ref 0–0.2)
BASOPHILS NFR BLD AUTO: 0.1 % (ref 0–1.5)
BASOPHILS NFR BLD AUTO: 0.2 % (ref 0–1.5)
BDY SITE: ABNORMAL
BILIRUB SERPL-MCNC: 0.3 MG/DL (ref 0–1.2)
BILIRUB SERPL-MCNC: 0.3 MG/DL (ref 0–1.2)
BILIRUB UR QL STRIP: NEGATIVE
BLD GP AB SCN SERPL QL: NEGATIVE
BUN SERPL-MCNC: 18 MG/DL (ref 8–23)
BUN SERPL-MCNC: 21 MG/DL (ref 8–23)
BUN/CREAT SERPL: 5.8 (ref 7–25)
BUN/CREAT SERPL: 6.5 (ref 7–25)
CALCIUM SPEC-SCNC: 6 MG/DL (ref 8.6–10.5)
CALCIUM SPEC-SCNC: 6 MG/DL (ref 8.6–10.5)
CHLORIDE SERPL-SCNC: 100 MMOL/L (ref 98–107)
CHLORIDE SERPL-SCNC: 105 MMOL/L (ref 98–107)
CK SERPL-CCNC: 90 U/L (ref 20–180)
CLARITY UR: CLEAR
CO2 BLDA-SCNC: 19.6 MMOL/L (ref 22–33)
CO2 SERPL-SCNC: 18.4 MMOL/L (ref 22–29)
CO2 SERPL-SCNC: 18.9 MMOL/L (ref 22–29)
COHGB MFR BLD: 1.3 % (ref 0–5)
COLOR UR: ABNORMAL
CREAT SERPL-MCNC: 3.12 MG/DL (ref 0.57–1)
CREAT SERPL-MCNC: 3.22 MG/DL (ref 0.57–1)
DEPRECATED RDW RBC AUTO: 51 FL (ref 37–54)
DEPRECATED RDW RBC AUTO: 53.1 FL (ref 37–54)
EGFRCR SERPLBLD CKD-EPI 2021: 15.3 ML/MIN/1.73
EGFRCR SERPLBLD CKD-EPI 2021: 15.9 ML/MIN/1.73
EOSINOPHIL # BLD AUTO: 0.03 10*3/MM3 (ref 0–0.4)
EOSINOPHIL # BLD AUTO: 0.03 10*3/MM3 (ref 0–0.4)
EOSINOPHIL NFR BLD AUTO: 0.3 % (ref 0.3–6.2)
EOSINOPHIL NFR BLD AUTO: 0.3 % (ref 0.3–6.2)
ERYTHROCYTE [DISTWIDTH] IN BLOOD BY AUTOMATED COUNT: 14.5 % (ref 12.3–15.4)
ERYTHROCYTE [DISTWIDTH] IN BLOOD BY AUTOMATED COUNT: 14.6 % (ref 12.3–15.4)
FERRITIN SERPL-MCNC: ABNORMAL NG/ML (ref 13–150)
GLOBULIN UR ELPH-MCNC: 1.9 GM/DL
GLOBULIN UR ELPH-MCNC: 2 GM/DL
GLUCOSE BLDC GLUCOMTR-MCNC: 116 MG/DL (ref 70–130)
GLUCOSE BLDC GLUCOMTR-MCNC: 118 MG/DL (ref 70–130)
GLUCOSE BLDC GLUCOMTR-MCNC: 127 MG/DL (ref 70–130)
GLUCOSE BLDC GLUCOMTR-MCNC: 134 MG/DL (ref 70–130)
GLUCOSE BLDC GLUCOMTR-MCNC: 60 MG/DL (ref 70–130)
GLUCOSE BLDC GLUCOMTR-MCNC: 64 MG/DL (ref 70–130)
GLUCOSE BLDC GLUCOMTR-MCNC: 64 MG/DL (ref 70–130)
GLUCOSE BLDC GLUCOMTR-MCNC: 70 MG/DL (ref 70–130)
GLUCOSE BLDC GLUCOMTR-MCNC: 81 MG/DL (ref 70–130)
GLUCOSE BLDC GLUCOMTR-MCNC: 86 MG/DL (ref 70–130)
GLUCOSE SERPL-MCNC: 146 MG/DL (ref 65–99)
GLUCOSE SERPL-MCNC: 62 MG/DL (ref 65–99)
GLUCOSE UR STRIP-MCNC: ABNORMAL MG/DL
HCO3 BLDA-SCNC: 18.7 MMOL/L (ref 20–26)
HCT VFR BLD AUTO: 20.2 % (ref 34–46.6)
HCT VFR BLD AUTO: 22.3 % (ref 34–46.6)
HCT VFR BLD AUTO: 27 % (ref 34–46.6)
HCT VFR BLD CALC: 20.8 % (ref 38–51)
HGB BLD-MCNC: 6.5 G/DL (ref 12–15.9)
HGB BLD-MCNC: 7 G/DL (ref 12–15.9)
HGB BLD-MCNC: 8.6 G/DL (ref 12–15.9)
HGB BLDA-MCNC: 6.8 G/DL (ref 13.5–17.5)
HGB UR QL STRIP.AUTO: NEGATIVE
HYALINE CASTS UR QL AUTO: ABNORMAL /LPF
IMM GRANULOCYTES # BLD AUTO: 0.05 10*3/MM3 (ref 0–0.05)
IMM GRANULOCYTES # BLD AUTO: 0.09 10*3/MM3 (ref 0–0.05)
IMM GRANULOCYTES NFR BLD AUTO: 0.6 % (ref 0–0.5)
IMM GRANULOCYTES NFR BLD AUTO: 0.8 % (ref 0–0.5)
INHALED O2 CONCENTRATION: 21 %
IRON 24H UR-MRATE: 76 MCG/DL (ref 37–145)
IRON SATN MFR SERPL: >101 % (ref 20–50)
KETONES UR QL STRIP: NEGATIVE
LEUKOCYTE ESTERASE UR QL STRIP.AUTO: ABNORMAL
LYMPHOCYTES # BLD AUTO: 0.9 10*3/MM3 (ref 0.7–3.1)
LYMPHOCYTES # BLD AUTO: 1.05 10*3/MM3 (ref 0.7–3.1)
LYMPHOCYTES NFR BLD AUTO: 9.8 % (ref 19.6–45.3)
LYMPHOCYTES NFR BLD AUTO: 9.9 % (ref 19.6–45.3)
Lab: ABNORMAL
Lab: ABNORMAL
MAGNESIUM SERPL-MCNC: 1.3 MG/DL (ref 1.6–2.4)
MCH RBC QN AUTO: 31.3 PG (ref 26.6–33)
MCH RBC QN AUTO: 31.4 PG (ref 26.6–33)
MCHC RBC AUTO-ENTMCNC: 31.4 G/DL (ref 31.5–35.7)
MCHC RBC AUTO-ENTMCNC: 32.2 G/DL (ref 31.5–35.7)
MCV RBC AUTO: 100 FL (ref 79–97)
MCV RBC AUTO: 97.1 FL (ref 79–97)
METHGB BLD QL: 0.7 % (ref 0–3)
MODALITY: ABNORMAL
MONOCYTES # BLD AUTO: 0.4 10*3/MM3 (ref 0.1–0.9)
MONOCYTES # BLD AUTO: 0.4 10*3/MM3 (ref 0.1–0.9)
MONOCYTES NFR BLD AUTO: 3.7 % (ref 5–12)
MONOCYTES NFR BLD AUTO: 4.4 % (ref 5–12)
MRSA DNA SPEC QL NAA+PROBE: NORMAL
NEUTROPHILS NFR BLD AUTO: 7.69 10*3/MM3 (ref 1.7–7)
NEUTROPHILS NFR BLD AUTO: 84.7 % (ref 42.7–76)
NEUTROPHILS NFR BLD AUTO: 85.2 % (ref 42.7–76)
NEUTROPHILS NFR BLD AUTO: 9.14 10*3/MM3 (ref 1.7–7)
NITRITE UR QL STRIP: NEGATIVE
NOTIFIED BY: ABNORMAL
NOTIFIED WHO: ABNORMAL
NRBC BLD AUTO-RTO: 0 /100 WBC (ref 0–0.2)
NRBC BLD AUTO-RTO: 0 /100 WBC (ref 0–0.2)
OXYHGB MFR BLDV: 97 % (ref 94–99)
PCO2 BLDA: 28.7 MM HG (ref 35–45)
PCO2 TEMP ADJ BLD: ABNORMAL MM[HG]
PH BLDA: 7.42 PH UNITS (ref 7.35–7.45)
PH UR STRIP.AUTO: <=5 [PH] (ref 5–8)
PH, TEMP CORRECTED: ABNORMAL
PLATELET # BLD AUTO: 140 10*3/MM3 (ref 140–450)
PLATELET # BLD AUTO: 161 10*3/MM3 (ref 140–450)
PMV BLD AUTO: 10.1 FL (ref 6–12)
PMV BLD AUTO: 9.8 FL (ref 6–12)
PO2 BLDA: 106 MM HG (ref 83–108)
PO2 TEMP ADJ BLD: ABNORMAL MM[HG]
POTASSIUM SERPL-SCNC: 3.5 MMOL/L (ref 3.5–5.2)
POTASSIUM SERPL-SCNC: 3.7 MMOL/L (ref 3.5–5.2)
PROT SERPL-MCNC: 3.5 G/DL (ref 6–8.5)
PROT SERPL-MCNC: 3.6 G/DL (ref 6–8.5)
PROT UR QL STRIP: ABNORMAL
RBC # BLD AUTO: 2.08 10*6/MM3 (ref 3.77–5.28)
RBC # BLD AUTO: 2.23 10*6/MM3 (ref 3.77–5.28)
RBC # UR STRIP: ABNORMAL /HPF
REF LAB TEST METHOD: ABNORMAL
RH BLD: POSITIVE
SAO2 % BLDCOA: 99 % (ref 94–99)
SODIUM SERPL-SCNC: 132 MMOL/L (ref 136–145)
SODIUM SERPL-SCNC: 135 MMOL/L (ref 136–145)
SP GR UR STRIP: 1.02 (ref 1–1.03)
SQUAMOUS #/AREA URNS HPF: ABNORMAL /HPF
T&S EXPIRATION DATE: NORMAL
TIBC SERPL-MCNC: 57 MCG/DL (ref 298–536)
TRANSFERRIN SERPL-MCNC: 38 MG/DL (ref 200–360)
UROBILINOGEN UR QL STRIP: ABNORMAL
VANCOMYCIN TROUGH SERPL-MCNC: 18.7 MCG/ML (ref 5–20)
VENTILATOR MODE: ABNORMAL
WBC # UR STRIP: ABNORMAL /HPF
WBC NRBC COR # BLD AUTO: 10.73 10*3/MM3 (ref 3.4–10.8)
WBC NRBC COR # BLD AUTO: 9.08 10*3/MM3 (ref 3.4–10.8)

## 2024-08-11 PROCEDURE — P9016 RBC LEUKOCYTES REDUCED: HCPCS

## 2024-08-11 PROCEDURE — 25010000002 CEFTRIAXONE PER 250 MG: Performed by: NURSE PRACTITIONER

## 2024-08-11 PROCEDURE — 76705 ECHO EXAM OF ABDOMEN: CPT

## 2024-08-11 PROCEDURE — 83540 ASSAY OF IRON: CPT | Performed by: INTERNAL MEDICINE

## 2024-08-11 PROCEDURE — 82375 ASSAY CARBOXYHB QUANT: CPT

## 2024-08-11 PROCEDURE — 25810000003 SODIUM CHLORIDE 0.9 % SOLUTION

## 2024-08-11 PROCEDURE — 76705 ECHO EXAM OF ABDOMEN: CPT | Performed by: RADIOLOGY

## 2024-08-11 PROCEDURE — 70450 CT HEAD/BRAIN W/O DYE: CPT | Performed by: RADIOLOGY

## 2024-08-11 PROCEDURE — 83050 HGB METHEMOGLOBIN QUAN: CPT

## 2024-08-11 PROCEDURE — 25010000002 HEPARIN (PORCINE) PER 1000 UNITS: Performed by: SURGERY

## 2024-08-11 PROCEDURE — 85014 HEMATOCRIT: CPT | Performed by: HOSPITALIST

## 2024-08-11 PROCEDURE — 99024 POSTOP FOLLOW-UP VISIT: CPT | Performed by: SURGERY

## 2024-08-11 PROCEDURE — 86923 COMPATIBILITY TEST ELECTRIC: CPT

## 2024-08-11 PROCEDURE — 99232 SBSQ HOSP IP/OBS MODERATE 35: CPT | Performed by: NURSE PRACTITIONER

## 2024-08-11 PROCEDURE — 81001 URINALYSIS AUTO W/SCOPE: CPT | Performed by: HOSPITALIST

## 2024-08-11 PROCEDURE — 25010000002 VANCOMYCIN 5 G RECONSTITUTED SOLUTION

## 2024-08-11 PROCEDURE — 99232 SBSQ HOSP IP/OBS MODERATE 35: CPT | Performed by: HOSPITALIST

## 2024-08-11 PROCEDURE — 86850 RBC ANTIBODY SCREEN: CPT | Performed by: HOSPITALIST

## 2024-08-11 PROCEDURE — 71045 X-RAY EXAM CHEST 1 VIEW: CPT | Performed by: RADIOLOGY

## 2024-08-11 PROCEDURE — 25810000003 SODIUM CHLORIDE 0.9 % SOLUTION: Performed by: HOSPITALIST

## 2024-08-11 PROCEDURE — 80053 COMPREHEN METABOLIC PANEL: CPT | Performed by: HOSPITALIST

## 2024-08-11 PROCEDURE — 36600 WITHDRAWAL OF ARTERIAL BLOOD: CPT

## 2024-08-11 PROCEDURE — 82140 ASSAY OF AMMONIA: CPT | Performed by: HOSPITALIST

## 2024-08-11 PROCEDURE — 94799 UNLISTED PULMONARY SVC/PX: CPT

## 2024-08-11 PROCEDURE — 80053 COMPREHEN METABOLIC PANEL: CPT | Performed by: INTERNAL MEDICINE

## 2024-08-11 PROCEDURE — 83735 ASSAY OF MAGNESIUM: CPT | Performed by: INTERNAL MEDICINE

## 2024-08-11 PROCEDURE — 86900 BLOOD TYPING SEROLOGIC ABO: CPT

## 2024-08-11 PROCEDURE — 82948 REAGENT STRIP/BLOOD GLUCOSE: CPT

## 2024-08-11 PROCEDURE — 86901 BLOOD TYPING SEROLOGIC RH(D): CPT | Performed by: HOSPITALIST

## 2024-08-11 PROCEDURE — 87641 MR-STAPH DNA AMP PROBE: CPT | Performed by: HOSPITALIST

## 2024-08-11 PROCEDURE — 80202 ASSAY OF VANCOMYCIN: CPT

## 2024-08-11 PROCEDURE — 85018 HEMOGLOBIN: CPT | Performed by: HOSPITALIST

## 2024-08-11 PROCEDURE — 85025 COMPLETE CBC W/AUTO DIFF WBC: CPT | Performed by: HOSPITALIST

## 2024-08-11 PROCEDURE — 71045 X-RAY EXAM CHEST 1 VIEW: CPT

## 2024-08-11 PROCEDURE — 82550 ASSAY OF CK (CPK): CPT | Performed by: HOSPITALIST

## 2024-08-11 PROCEDURE — 70450 CT HEAD/BRAIN W/O DYE: CPT

## 2024-08-11 PROCEDURE — 86900 BLOOD TYPING SEROLOGIC ABO: CPT | Performed by: HOSPITALIST

## 2024-08-11 PROCEDURE — 85025 COMPLETE CBC W/AUTO DIFF WBC: CPT | Performed by: INTERNAL MEDICINE

## 2024-08-11 PROCEDURE — 94761 N-INVAS EAR/PLS OXIMETRY MLT: CPT

## 2024-08-11 PROCEDURE — 36430 TRANSFUSION BLD/BLD COMPNT: CPT

## 2024-08-11 PROCEDURE — 82728 ASSAY OF FERRITIN: CPT | Performed by: INTERNAL MEDICINE

## 2024-08-11 PROCEDURE — 84466 ASSAY OF TRANSFERRIN: CPT | Performed by: INTERNAL MEDICINE

## 2024-08-11 PROCEDURE — 82805 BLOOD GASES W/O2 SATURATION: CPT

## 2024-08-11 RX ORDER — OXYCODONE HYDROCHLORIDE 5 MG/1
5 TABLET ORAL EVERY 8 HOURS PRN
Status: DISCONTINUED | OUTPATIENT
Start: 2024-08-11 | End: 2024-08-14

## 2024-08-11 RX ORDER — GABAPENTIN 100 MG/1
100 CAPSULE ORAL NIGHTLY
Status: DISCONTINUED | OUTPATIENT
Start: 2024-08-12 | End: 2024-08-13

## 2024-08-11 RX ORDER — MIDODRINE HYDROCHLORIDE 2.5 MG/1
5 TABLET ORAL
Status: DISCONTINUED | OUTPATIENT
Start: 2024-08-11 | End: 2024-08-12

## 2024-08-11 RX ORDER — FLUOXETINE 10 MG/1
10 CAPSULE ORAL DAILY
Status: DISCONTINUED | OUTPATIENT
Start: 2024-08-12 | End: 2024-08-21 | Stop reason: HOSPADM

## 2024-08-11 RX ADMIN — FLUOXETINE HYDROCHLORIDE 60 MG: 20 CAPSULE ORAL at 09:33

## 2024-08-11 RX ADMIN — PANTOPRAZOLE SODIUM 40 MG: 40 TABLET, DELAYED RELEASE ORAL at 09:37

## 2024-08-11 RX ADMIN — CARBIDOPA AND LEVODOPA 5 MG: 50; 200 TABLET, EXTENDED RELEASE ORAL at 12:07

## 2024-08-11 RX ADMIN — OXYCODONE HYDROCHLORIDE 5 MG: 5 TABLET ORAL at 05:22

## 2024-08-11 RX ADMIN — TRAZODONE HYDROCHLORIDE 25 MG: 50 TABLET ORAL at 20:23

## 2024-08-11 RX ADMIN — Medication 10 ML: at 20:23

## 2024-08-11 RX ADMIN — CARBIDOPA AND LEVODOPA 5 MG: 50; 200 TABLET, EXTENDED RELEASE ORAL at 17:23

## 2024-08-11 RX ADMIN — FOLIC ACID 1 MG: 1 TABLET ORAL at 09:34

## 2024-08-11 RX ADMIN — LACOSAMIDE 50 MG: 50 TABLET, FILM COATED ORAL at 09:35

## 2024-08-11 RX ADMIN — SODIUM CHLORIDE 250 ML: 9 INJECTION, SOLUTION INTRAVENOUS at 13:03

## 2024-08-11 RX ADMIN — Medication 10 ML: at 09:37

## 2024-08-11 RX ADMIN — VANCOMYCIN HYDROCHLORIDE 750 MG: 500 INJECTION, POWDER, LYOPHILIZED, FOR SOLUTION INTRAVENOUS at 09:50

## 2024-08-11 RX ADMIN — OXYCODONE 5 MG: 5 TABLET ORAL at 21:18

## 2024-08-11 RX ADMIN — FERROUS SULFATE TAB 325 MG (65 MG ELEMENTAL FE) 325 MG: 325 (65 FE) TAB at 09:33

## 2024-08-11 RX ADMIN — LEVOTHYROXINE SODIUM 100 MCG: 0.1 TABLET ORAL at 09:34

## 2024-08-11 RX ADMIN — HEPARIN SODIUM 5000 UNITS: 5000 INJECTION INTRAVENOUS; SUBCUTANEOUS at 09:37

## 2024-08-11 RX ADMIN — ROPINIROLE HYDROCHLORIDE 0.5 MG: 0.25 TABLET, FILM COATED ORAL at 09:34

## 2024-08-11 RX ADMIN — CEFTRIAXONE 1000 MG: 1 INJECTION, POWDER, FOR SOLUTION INTRAMUSCULAR; INTRAVENOUS at 21:20

## 2024-08-11 RX ADMIN — HEPARIN SODIUM 5000 UNITS: 5000 INJECTION INTRAVENOUS; SUBCUTANEOUS at 20:23

## 2024-08-11 RX ADMIN — ACETAMINOPHEN 1000 MG: 500 TABLET ORAL at 11:44

## 2024-08-11 RX ADMIN — LACOSAMIDE 50 MG: 50 TABLET, FILM COATED ORAL at 20:23

## 2024-08-11 RX ADMIN — GABAPENTIN 100 MG: 100 CAPSULE ORAL at 09:35

## 2024-08-11 RX ADMIN — ACETAMINOPHEN 1000 MG: 500 TABLET ORAL at 17:23

## 2024-08-11 RX ADMIN — ASPIRIN 81 MG: 81 TABLET, COATED ORAL at 09:34

## 2024-08-11 NOTE — PROGRESS NOTES
Chief complaint: Diabetic foot wound    Postoperative day 2 following right above-the-knee amputation    Patient doing well this morning with complaint of pain    Right above-the-knee amputation incision site clean dry and intact with staples in place    64-year-old female status post right above-knee amputation for diabetic nonhealing foot wound  -Incision clean dry and intact, leave open to air  -Follow-up with Dr. Santoro 2 weeks

## 2024-08-11 NOTE — PLAN OF CARE
Goal Outcome Evaluation:  Plan of Care Reviewed With: patient        Progress: improving  Outcome Evaluation: Resting comfortably.  Tolerated blood transfusion.  Lethargic and hard to arouse once during the day.  VSS.

## 2024-08-11 NOTE — PLAN OF CARE
Goal Outcome Evaluation:         Pt has been withdrawn this shift expressing sadness r/t her new amputation, support given. Wound care completed on sacrum. IV abx given, compliant with care this shift, pt is resting in bed att, no complaints att, VSS, will cont with POC

## 2024-08-11 NOTE — PROGRESS NOTES
"Nephrology Progress Note      Subjective     08/10/2024 patient tolerated peritoneal dialysis had only 218 cc of ultrafiltration but clinically patient is euvolemic    08/11/2024 peritoneal dialysis was held yesterday as the patient was hypotensive which she is hypertensive again today received 1 unit of blood is going to get another unit of blood denies any shortness of breath no swelling no nausea vomiting diarrhea    Objective       Vital signs :     Temp:  [97.2 °F (36.2 °C)-98 °F (36.7 °C)] 98 °F (36.7 °C)  Heart Rate:  [81-90] 82  Resp:  [18-20] 18  BP: ()/(50-65) 88/54    Intake/Output                         08/09/24 0701 - 08/10/24 0700 08/10/24 0701 - 08/11/24 0700     9035-1616 2775-3163 Total 8615-3832 5976-1153 Total                 Intake    P.O.  120  120 240  240  20 260    I.V.  250  -- 250  0  -- 0    IV Piggyback  100  -- 100  --  -- --    Total Intake 470 120 590 240 20 260       Output    Dialysis  838  -- 838  218  -- 218    Total Output 838 -- 838 218 -- 218           08/10/2024 examined and reviewed  08/11/2024 examined and reviewed    Physical Exam:    General Appearance : alert, pleasant, appears stated age, cooperative and oriented  Lungs : clear to auscultation, respirations regular  Heart :  regular rhythm & normal rate, normal S1, S2 and no murmur, no rub  Abdomen : Soft, nondistended PD catheter site clear  Extremities : 1+ to trace edema, right AKA with dressing  Neurologic :   orientated to person, place, time and situation, Grossly no focal deficits        Laboratory Data :     Albumin No results found for: \"ALBUMIN\"     Magnesium No results found for: \"MG\"       PTH               No results found for: \"PTH\"    CBC and coagulation:  Results from last 7 days   Lab Units 08/10/24  0528 08/09/24  0049 08/07/24  0047   WBC 10*3/mm3 8.26 5.34 7.20   HEMOGLOBIN g/dL 7.4* 9.5* 9.3*   HEMATOCRIT % 23.5* 31.6* 30.6*   MCV fL 97.1* 102.3* 101.7*   MCHC g/dL 31.5 30.1* 30.4*   PLATELETS " 10*3/mm3 193 149 172   INR   --  0.96  --      Acid/base balance:      Renal and electrolytes:    Results from last 7 days   Lab Units 08/10/24  0406 08/09/24  0049 08/07/24  0047 08/06/24 0229 08/05/24 0223   SODIUM mmol/L 135* 136 140 136 138   POTASSIUM mmol/L 3.2* 4.0 4.3 5.1 3.8   CHLORIDE mmol/L 103 103 109* 107 103   CO2 mmol/L 19.0* 19.6* 19.0* 17.8* 20.6*   BUN mg/dL 16 18 20 21 21   CREATININE mg/dL 2.80* 3.05* 3.12* 3.19* 3.19*   CALCIUM mg/dL 6.5* 6.9* 6.7* 6.6* 6.9*     Estimated Creatinine Clearance: 13.1 mL/min (A) (by C-G formula based on SCr of 2.8 mg/dL (H)).  @GFRCG:3@   Liver and pancreatic function:  Results from last 7 days   Lab Units 08/06/24 0229 08/05/24  0223   ALBUMIN g/dL 1.9* 2.2*   BILIRUBIN mg/dL 0.2 0.2   ALK PHOS U/L 189* 205*   AST (SGOT) U/L 16 15   ALT (SGPT) U/L 6 9         Cardiac:      Liver and pancreatic function:  Results from last 7 days   Lab Units 08/06/24 0229 08/05/24 0223   ALBUMIN g/dL 1.9* 2.2*   BILIRUBIN mg/dL 0.2 0.2   ALK PHOS U/L 189* 205*   AST (SGOT) U/L 16 15   ALT (SGPT) U/L 6 9       Medications :     acetaminophen, 1,000 mg, Oral, Q6H  aspirin, 81 mg, Oral, Daily  atorvastatin, 80 mg, Oral, Nightly  cefTRIAXone, 1,000 mg, Intravenous, Q24H  ferrous sulfate, 325 mg, Oral, Daily With Breakfast  FLUoxetine, 60 mg, Oral, Daily  folic acid, 1 mg, Oral, Daily  gabapentin, 100 mg, Oral, Q12H  heparin (porcine), 5,000 Units, Subcutaneous, Q12H  insulin lispro, 2-7 Units, Subcutaneous, 4x Daily AC & at Bedtime  lacosamide, 50 mg, Oral, Q12H  levothyroxine, 100 mcg, Oral, QAM AC  pantoprazole, 40 mg, Oral, QAM AC  rOPINIRole, 0.5 mg, Oral, BID  sodium chloride, 10 mL, Intravenous, Q12H  traZODone, 25 mg, Oral, Nightly  Vancomycin Pharmacy Intermittent/Pulse Dosing, , Does not apply, Daily               Assessment & Plan     08/10/2024 reviewed and updated  08/11/2024 reviewed and updated    -End-stage renal disease on peritoneal  dialysis  -Hypotension  -Anemia  -Hypokalemia  -Type 2 diabetes mellitus with renal involvement  - Persistent nausea and vomiting  - Cachexia and malnutrition    08/10/2024  Patient had only 200 cc of ultrafiltration with peritoneal dialysis will continue low-dose 1.5% dialysis solution today also  Significant protein calorie malnutrition, educated and counseled the patient and to continue on Nepro 3 times a day   Will replace potassium  Will check iron studies as the patient has anemia    08/11/2024  We held peritoneal dialysis yesterday and will hold it today also as the patient blood pressures towards lower side denies any shortness of breath and potassium is low  Will replace potassium  Will start patient on midodrine 5 mg p.o. 3 times daily for hypotension  Patient is getting blood also    Prognosis guarded    Discussed with Dr. Javier    Discussed with RN    Please avoid nephrotoxic medication and pharmacy to adjust the medication according to the GFR.      Plan of care was discussed with the patient. Patient understood, verbalized, agreed.      JASON Burks MD  08/11/24  07:57 EDT

## 2024-08-11 NOTE — PROGRESS NOTES
[facility] HOSPITALIST PROGRESS NOTE     Patient Identification:  Name:  Reny Elena  Age:  64 y.o.  Sex:  female  :  1960  MRN:  [id number]  Visit Number:  [id number]  Primary Care Provider:  Susan Stephens APRN    Length of stay:  8    Subjective: Patient seen and examined she is currently eating breakfast, very pleasant and conversant, blood pressure has been low yesterday hence PD was held.  H&H also dropped.  She has no complaints at this time.  With PD held, she developed borderline hypoglycemia.  She reports good response with low-dose Neurontin due to phantom limb pain.    Chief Complaint: Right heel ulcer status post above-knee amputation  ----------------------------------------------------------------------------------------------------------------------  Current Hospital Meds:  acetaminophen, 1,000 mg, Oral, Q6H  aspirin, 81 mg, Oral, Daily  atorvastatin, 80 mg, Oral, Nightly  cefTRIAXone, 1,000 mg, Intravenous, Q24H  ferrous sulfate, 325 mg, Oral, Daily With Breakfast  FLUoxetine, 60 mg, Oral, Daily  folic acid, 1 mg, Oral, Daily  gabapentin, 100 mg, Oral, Q12H  heparin (porcine), 5,000 Units, Subcutaneous, Q12H  insulin lispro, 2-7 Units, Subcutaneous, 4x Daily AC & at Bedtime  lacosamide, 50 mg, Oral, Q12H  levothyroxine, 100 mcg, Oral, QAM AC  midodrine, 5 mg, Oral, TID AC  pantoprazole, 40 mg, Oral, QAM AC  rOPINIRole, 0.5 mg, Oral, BID  sodium chloride, 10 mL, Intravenous, Q12H  traZODone, 25 mg, Oral, Nightly  vancomycin, 750 mg, Intravenous, Once  Vancomycin Pharmacy Intermittent/Pulse Dosing, , Does not apply, Daily         ----------------------------------------------------------------------------------------------------------------------  Vital Signs:  Temp:  [97.2 °F (36.2 °C)-98 °F (36.7 °C)] 98 °F (36.7 °C)  Heart Rate:  [81-90] 82  Resp:  [18-20] 18  BP: ()/(50-65) 109/63       Tele:       24  0600 08/10/24  0500 24  0500   Weight: 45.2 kg  (99 lb 10.4 oz) 41.9 kg (92 lb 6 oz) 41.9 kg (92 lb 6 oz)     Body mass index is 15.86 kg/m².    Intake/Output Summary (Last 24 hours) at 8/11/2024 1017  Last data filed at 8/11/2024 0700  Gross per 24 hour   Intake 260 ml   Output --   Net 260 ml     Diet: Diabetic; Consistent Carbohydrate; Fluid Consistency: Thin (IDDSI 0)  ----------------------------------------------------------------------------------------------------------------------  Physical exam:  General: Awake and alert conversant she is eating breakfast in bed upright position, malnourished, chronically ill-appearing,.  No respiratory distress.    Skin:  Skin is warm and dry. No rash noted. No pallor.    HENT:  Head:  Normocephalic and atraumatic.  Mouth:  Moist mucous membranes.    Eyes:  Conjunctivae and EOM are normal.  Pupils are equal, round, and reactive to light.  No scleral icterus.    Neck:  Neck supple.  No JVD present.    Pulmonary/Chest:  No respiratory distress, no wheezes, no crackles, with normal breath sounds and good air movement.  Cardiovascular:  Normal rate, regular rhythm and normal heart sounds with no murmur.  Abdominal: Catheter in place soft.  Bowel sounds are normal.  No distension and no tenderness.   Extremities: Right above-knee stump with dressing no bleeding no drainage.  Left good pedal pulse, she has edema both upper and lower extremity.    Neurological:  Motor strength equal no obvious deficit, sensory grossly intact.   No cranial nerve deficit.  No tongue deviation.  No facial droop.  No slurred speech.    Genitourinary: No Doshi catheter  Back:  ----------------------------------------------------------------------------------------------------------------------  ----------------------------------------------------------------------------------------------------------------------  Results from last 7 days   Lab Units 08/06/24 2031 08/06/24  1817   HSTROP T ng/L 121* 124*     Results from last 7 days   Lab Units  "08/11/24  0744 08/10/24  0528 08/09/24  0049   WBC 10*3/mm3 9.08 8.26 5.34   HEMOGLOBIN g/dL 7.0* 7.4* 9.5*   HEMATOCRIT % 22.3* 23.5* 31.6*   MCV fL 100.0* 97.1* 102.3*   MCHC g/dL 31.4* 31.5 30.1*   PLATELETS 10*3/mm3 140 193 149   INR   --   --  0.96         Results from last 7 days   Lab Units 08/11/24  0744 08/10/24  0406 08/09/24  0049 08/07/24  0047 08/06/24 0229 08/05/24  0223   SODIUM mmol/L 135* 135* 136   < > 136 138   POTASSIUM mmol/L 3.5 3.2* 4.0   < > 5.1 3.8   MAGNESIUM mg/dL 1.3*  --   --   --   --   --    CHLORIDE mmol/L 105 103 103   < > 107 103   CO2 mmol/L 18.9* 19.0* 19.6*   < > 17.8* 20.6*   BUN mg/dL 18 16 18   < > 21 21   CREATININE mg/dL 3.12* 2.80* 3.05*   < > 3.19* 3.19*   CALCIUM mg/dL 6.0* 6.5* 6.9*   < > 6.6* 6.9*   GLUCOSE mg/dL 62* 256* 173*   < > 105* 130*   ALBUMIN g/dL 1.6*  --   --   --  1.9* 2.2*   BILIRUBIN mg/dL 0.3  --   --   --  0.2 0.2   ALK PHOS U/L 182*  --   --   --  189* 205*   AST (SGOT) U/L 965*  --   --   --  16 15   ALT (SGPT) U/L 147*  --   --   --  6 9    < > = values in this interval not displayed.   Estimated Creatinine Clearance: 11.7 mL/min (A) (by C-G formula based on SCr of 3.12 mg/dL (H)).    No results found for: \"AMMONIA\"      No results found for: \"BLOODCX\"  No results found for: \"URINECX\"  No results found for: \"WOUNDCX\"  No results found for: \"STOOLCX\"    I have personally looked at the labs and they are summarized above.  ----------------------------------------------------------------------------------------------------------------------  Imaging Results (Last 24 Hours)       ** No results found for the last 24 hours. **          ----------------------------------------------------------------------------------------------------------------------  Assessment and Plan:  -Status post above-knee amputation for right heel ulcer with osteomyelitis AKA was performed in the setting of hardware abscess.  -Acute blood loss anemia in the setting of chronic " kidney disease and anemia from chronic disease.  -Acute hypomagnesemia  -Abnormal transaminases with acute transient abnormality predominantly AST we will check for CPK   -Diabetes related borderline hypoglycemia  -Severe hypoalbuminemia with chronic disease malnutrition severe  -History of essential hypertension currently borderline hypotensive  -Anxiety and depression  -End-stage renal disease on PD  -History of hypothyroidism    Hold blood pressure medications, check CPK, transfuse the patient 1 unit packed RBC, continue low-dose Neurontin, hold insulin sliding scale while off PD, replace magnesium per renal protocol.  Incentive spirometry, PT OT.  Hold Lipitor for now due to abnormal LFTs.      Disposition pending      Rebeka Woodruff MD  08/11/24  10:17 EDT

## 2024-08-11 NOTE — PROGRESS NOTES
Pharmacokinetics Service Note:  Patient is on day 9 of vancomycin for a skin and soft tissue infection. Random vancomycin level was reported as 18.7 mcg/mL on 8/11. Based on the level and the patient receiving peritoneal dialysis, the patient will be given 750mg vancomycin IV x1 dose today. Will re-check level and re-dose when appropriate.       Thank you,    Kirk Thomas, PharmD  Pharmacy Resident  08/11/24  08:51 EDT

## 2024-08-11 NOTE — PROGRESS NOTES
PROGRESS NOTE         Patient Identification:  Name:  Reny Elena  Age:  64 y.o.  Sex:  female  :  1960  MRN:  [id number]  Visit Number:  [id number]  Primary Care Provider:  Susan Stephens APRN         LOS: 8 days       ----------------------------------------------------------------------------------------------------------------------  Subjective       Chief Complaints:    Vomiting        Interval History:      The patient is resting comfortably in bed.  On room air with no apparent distress.  Afebrile.  No reports of diarrhea.  Primary RN at the bedside, the patient is hypoglycemic this morning.  The patient complains of pain to the right AKA stump.  Lung exam is clear with diminished bases bilaterally to auscultation.  Abdomen soft and nontender with normoactive bowel sounds.  The left stump is open to air, staples in place with no surrounding erythema or edema.  No drainage noted.  WBC normal 9.08.  Surgical pathology pending.    Review of Systems:    Constitutional: no fever, chills and night sweats.  Generalized fatigue.  Eyes: no eye drainage, itching or redness.  HEENT: no mouth sores, dysphagia or nose bleed.  Respiratory: no for shortness of breath, cough or production of sputum.  Cardiovascular: no chest pain, no palpitations, no orthopnea.  Gastrointestinal: Resolved nausea, No documented vomiting. No diarrhea. No abdominal pain, hematemesis or rectal bleeding.  Genitourinary: peritoneal dialysis. no dysuria or polyuria.  Hematologic/lymphatic: no lymph node abnormalities, no easy bruising or easy bleeding.  Musculoskeletal: no muscle or joint pain.  Skin: R foot wound. No rash and no itching.  Neurological: no loss of consciousness, no seizure, no headache.  Behavioral/Psych: no depression or suicidal ideation.  Endocrine: no hot flashes.  Immunologic:  negative.    ----------------------------------------------------------------------------------------------------------------------      Objective       Current [facility] Meds:  acetaminophen, 1,000 mg, Oral, Q6H  aspirin, 81 mg, Oral, Daily  atorvastatin, 80 mg, Oral, Nightly  cefTRIAXone, 1,000 mg, Intravenous, Q24H  ferrous sulfate, 325 mg, Oral, Daily With Breakfast  [START ON 8/12/2024] FLUoxetine, 10 mg, Oral, Daily  folic acid, 1 mg, Oral, Daily  gabapentin, 100 mg, Oral, Q12H  heparin (porcine), 5,000 Units, Subcutaneous, Q12H  insulin lispro, 2-7 Units, Subcutaneous, 4x Daily AC & at Bedtime  lacosamide, 50 mg, Oral, Q12H  levothyroxine, 100 mcg, Oral, QAM AC  midodrine, 5 mg, Oral, TID AC  pantoprazole, 40 mg, Oral, QAM AC  sodium chloride, 250 mL, Intravenous, Once  sodium chloride, 10 mL, Intravenous, Q12H  traZODone, 25 mg, Oral, Nightly  Vancomycin Pharmacy Intermittent/Pulse Dosing, , Does not apply, Daily           ----------------------------------------------------------------------------------------------------------------------    Vital Signs:  Temp:  [97.2 °F (36.2 °C)-98 °F (36.7 °C)] 98 °F (36.7 °C)  Heart Rate:  [81-90] 82  Resp:  [18-20] 18  BP: ()/(50-70) 105/70  Mean Arterial Pressure (Non-Invasive) for the past 24 hrs (Last 3 readings):   Noninvasive MAP (mmHg)   08/10/24 1426 81       SpO2 Percentage    08/09/24 2100 08/10/24 0650 08/10/24 1426   SpO2: 99% 99% 99%     SpO2:  [99 %] 99 %  on   ;   Device (Oxygen Therapy): room air    Body mass index is 15.86 kg/m².  Wt Readings from Last 3 Encounters:   08/11/24 41.9 kg (92 lb 6 oz)   07/26/24 45.4 kg (100 lb)   07/26/24 51.7 kg (114 lb)        Intake/Output Summary (Last 24 hours) at 8/11/2024 1212  Last data filed at 8/11/2024 0700  Gross per 24 hour   Intake 260 ml   Output --   Net 260 ml     Diet: Diabetic; Consistent Carbohydrate; Fluid Consistency: Thin (IDDSI  0)  ----------------------------------------------------------------------------------------------------------------------      Physical Exam:    Constitutional: Frail elderly female sitting up comfortably in bed.  On room air with no apparent distress.  Complains of pain to AKA site.  Hypoglycemic with primary RN at bedside.  HENT:  Head: Normocephalic and atraumatic.  Mouth:  Moist mucous membranes.    Eyes:  Conjunctivae and EOM are normal.  No scleral icterus.  Neck:  Neck supple.  No JVD present.    Cardiovascular:  Normal rate, regular rhythm and normal heart sounds with no murmur. No edema.  Pulmonary/Chest: Clear bilaterally to auscultation with diminished bases no respiratory distress, no wheezes, no crackles, with normal breath sounds and good air movement.  Abdominal:  peritoneal dialysis catheter. Soft.  Bowel sounds are normal.  No distension   Musculoskeletal: That is post right AKA no edema, no tenderness, and no deformity.  No swelling or redness of joints.  Neurological:  Alert and oriented to person, place, and time.  No facial droop.  No slurred speech.  Mildly confused.  Skin: Right AKA, open to air with staples in place, no erythema/edema and no drainage skin is warm and dry.  No rash noted.  No pallor.         ----------------------------------------------------------------------------------------------------------------------  Results from last 7 days   Lab Units 08/11/24  0744 08/06/24  2031 08/06/24  1817   CK TOTAL U/L 90  --   --    HSTROP T ng/L  --  121* 124*           Results from last 7 days   Lab Units 08/11/24  0744 08/10/24  0528 08/09/24  0049   WBC 10*3/mm3 9.08 8.26 5.34   HEMOGLOBIN g/dL 7.0* 7.4* 9.5*   HEMATOCRIT % 22.3* 23.5* 31.6*   MCV fL 100.0* 97.1* 102.3*   MCHC g/dL 31.4* 31.5 30.1*   PLATELETS 10*3/mm3 140 193 149   INR   --   --  0.96     Results from last 7 days   Lab Units 08/11/24  0744 08/10/24  0406 08/09/24  0049 08/07/24  0047 08/06/24 0229 08/05/24 0223  "  SODIUM mmol/L 135* 135* 136   < > 136 138   POTASSIUM mmol/L 3.5 3.2* 4.0   < > 5.1 3.8   MAGNESIUM mg/dL 1.3*  --   --   --   --   --    CHLORIDE mmol/L 105 103 103   < > 107 103   CO2 mmol/L 18.9* 19.0* 19.6*   < > 17.8* 20.6*   BUN mg/dL 18 16 18   < > 21 21   CREATININE mg/dL 3.12* 2.80* 3.05*   < > 3.19* 3.19*   CALCIUM mg/dL 6.0* 6.5* 6.9*   < > 6.6* 6.9*   GLUCOSE mg/dL 62* 256* 173*   < > 105* 130*   ALBUMIN g/dL 1.6*  --   --   --  1.9* 2.2*   BILIRUBIN mg/dL 0.3  --   --   --  0.2 0.2   ALK PHOS U/L 182*  --   --   --  189* 205*   AST (SGOT) U/L 965*  --   --   --  16 15   ALT (SGPT) U/L 147*  --   --   --  6 9    < > = values in this interval not displayed.   Estimated Creatinine Clearance: 11.7 mL/min (A) (by C-G formula based on SCr of 3.12 mg/dL (H)).  No results found for: \"AMMONIA\"    Glucose   Date/Time Value Ref Range Status   08/11/2024 1151 134 (H) 70 - 130 mg/dL Final   08/11/2024 1052 116 70 - 130 mg/dL Final   08/11/2024 0928 86 70 - 130 mg/dL Final   08/11/2024 0838 70 70 - 130 mg/dL Final   08/11/2024 0805 64 (L) 70 - 130 mg/dL Final   08/11/2024 0612 64 (L) 70 - 130 mg/dL Final   08/11/2024 0557 60 (L) 70 - 130 mg/dL Final   08/11/2024 0136 81 70 - 130 mg/dL Final     Lab Results   Component Value Date    HGBA1C 6.10 (H) 08/02/2024     Lab Results   Component Value Date    TSH 17.140 (H) 07/21/2024    FREET4 2.1 (H) 05/17/2024       Blood Culture   Date Value Ref Range Status   08/02/2024 No growth at 24 hours  Preliminary   08/02/2024 No growth at 24 hours  Preliminary     No results found for: \"URINECX\"  No results found for: \"WOUNDCX\"  No results found for: \"STOOLCX\"  No results found for: \"RESPCX\"  Pain Management Panel  More data exists         Latest Ref Rng & Units 7/21/2024 4/28/2024   Pain Management Panel   Amphetamine, Urine Qual Negative Negative  Negative    Barbiturates Screen, Urine Negative Negative  Negative    Benzodiazepine Screen, Urine Negative Negative  Negative  "   Buprenorphine, Screen, Urine Negative Negative  Negative    Cocaine Screen, Urine Negative Negative  Negative    Fentanyl, Urine Negative Negative  Negative    Methadone Screen , Urine Negative Negative  Negative    Methamphetamine, Ur Negative Negative  Negative       Details                     ----------------------------------------------------------------------------------------------------------------------  Imaging Results (Last 24 Hours)       ** No results found for the last 24 hours. **            ----------------------------------------------------------------------------------------------------------------------    Pertinent Infectious Disease Results    Thank you Dr. Crawford for allowing us to participate in the care of your patient.  As you well know, Ms. Reny Elena is a 64 y.o. female with past medical history significant for seizure disorder, type 2 diabetes, arthritis, CHF, hypothyroidism, hypertension, hyperlipidemia, GERD, PAD, ESRD, iron deficiency anemia,, who presented to [facility] Emergency Department on 8/2/2024 for nausea, vomiting, diarrhea and worsening pain of right diabetic foot wound.  Patient was recently seen in our ED on 7/21/2024 with complaints of hallucinations, fever, and right foot pain.  At this time, patient was diagnosed with right heel ulcer and UTI.  She was sent home with an antibiotic but was unsure of the name.  Patient reported she started having nausea and vomiting after taking the antibiotics so she stopped taking them.  However, she continued to have nausea and vomiting.       Patient was tachycardic in the ED.Labs show K+ at upper limit of normal, BUN 30, cr 4.36, glucose 176, alk phos 323 and albumin 2.9, otherwise normal LFTs. CRP mildly elevated at 2.34, while lactate and WBC are normal. UA shows large leuk esterase and unable to determine RBC, WBC, bacteria of squamous cells due to loaded field.  Patient's urine culture from 7/21/2024 grew E.  coli.  She had also been following with wound care who it obtained a wound culture on the same day.  This culture finalized with mixed gram-negative maurisio and light growth of Enterococcus faecalis.       X-ray of the right foot performed on 8/2/2024 revealed partial amputation of the fifth metatarsal. Bony demineralization. No acute fracture or dislocation. No lytic or blastic bony lesions seen. Diffuse interphalangeal joint space loss. Mild degenerative changes in the midfoot. No cortical erosion. Fixation timur in the tibia/talus/calcaneus noted. Diffuse soft tissue swelling. Vascular calcifications. No soft tissue gas.  CT abdomen/pelvis revealed peritoneal dialysis catheter noted in the anterior right abdominal wall with the catheter noted to terminate in the left lateral pelvis. Small volume of free fluid in the right upper quadrant and lower posterior pelvis and right lower quadrant. Slightly elevated left hemidiaphragm. No bowel or renal obstruction. No abdominal aortic aneurysm. No features of acute appendicitis. Stranding identified in the presacral space could present changes related to mild distal colitis. Fluid-filled bladder with small volume of air in the bladder lumen. No pneumatosis. No conclusive features of free air. Distended configuration to the gallbladder likely due to fasting. No conclusive features of gastritis or enteritis.       Patient continued to have nausea and vomiting after 2 doses of Zofran.  She was admitted for further treatment.  Subsequently, patient was started on IV Rocephin in the setting of E. coli UTI.  Flagyl was added to cover possible colitis.  Patient was also started on IV vancomycin for Enterococcus faecalis wound infection.  General surgery consulted for possible debridement of patient's wound.     Assessment/Plan       Assessment     R foot wound infection, Enterococcus faecalis  Possible osteomyelitis of R foot  Hardware present in RLE  E. Coli UTI  Possible colitis          Plan      The patient is resting comfortably in bed.  On room air with no apparent distress.  Afebrile.  No reports of diarrhea.  Primary RN at the bedside, the patient is hypoglycemic this morning.  The patient complains of pain to the right AKA stump.  Lung exam is clear with diminished bases bilaterally to auscultation.  Abdomen soft and nontender with normoactive bowel sounds.  The left stump is open to air, staples in place with no surrounding erythema or edema.  No drainage noted.  WBC normal 9.08.  Surgical pathology pending.    We will continue with vancomycin and ceftriaxone courses in the setting of E. coli UTI and gram-negative maurisio and Enterococcus faecalis wound culture.  The patient is status post AKA.  Antibiotic course pending surgical pathology report.  We will continue to monitor closely.    ANTIMICROBIAL THERAPY    cefdinir - 300 MG  cefTRIAXone (ROCEPHIN) 1000 mg IVPB in 100 mL NS (VTB)  Vancomycin Pharmacy Intermittent/Pulse Dosing     Code Status:   Code Status and Medical Interventions: No CPR (Do Not Attempt to Resuscitate); Limited Support; No intubation (DNI)   Ordered at: 08/04/24 1645     Medical Intervention Limits:    No intubation (DNI)     Code Status (Patient has no pulse and is not breathing):    No CPR (Do Not Attempt to Resuscitate)     Medical Interventions (Patient has pulse or is breathing):    Limited Support       DARIUS Pike  08/11/24  12:12 EDT

## 2024-08-12 ENCOUNTER — APPOINTMENT (OUTPATIENT)
Dept: GENERAL RADIOLOGY | Facility: HOSPITAL | Age: 64
DRG: 853 | End: 2024-08-12
Payer: MEDICARE

## 2024-08-12 LAB
ALBUMIN SERPL-MCNC: 1.4 G/DL (ref 3.5–5.2)
ALBUMIN/GLOB SERPL: 0.8 G/DL
ALP SERPL-CCNC: 196 U/L (ref 39–117)
ALT SERPL W P-5'-P-CCNC: 322 U/L (ref 1–33)
ANION GAP SERPL CALCULATED.3IONS-SCNC: 16.1 MMOL/L (ref 5–15)
AST SERPL-CCNC: 2747 U/L (ref 1–32)
BASOPHILS # BLD AUTO: 0.02 10*3/MM3 (ref 0–0.2)
BASOPHILS NFR BLD AUTO: 0.2 % (ref 0–1.5)
BH BB BLOOD EXPIRATION DATE: NORMAL
BH BB BLOOD TYPE BARCODE: 5100
BH BB DISPENSE STATUS: NORMAL
BH BB PRODUCT CODE: NORMAL
BH BB UNIT NUMBER: NORMAL
BILIRUB SERPL-MCNC: 0.3 MG/DL (ref 0–1.2)
BUN SERPL-MCNC: 18 MG/DL (ref 8–23)
BUN/CREAT SERPL: 6.3 (ref 7–25)
CALCIUM SPEC-SCNC: 5.7 MG/DL (ref 8.6–10.5)
CHLORIDE SERPL-SCNC: 104 MMOL/L (ref 98–107)
CO2 SERPL-SCNC: 15.9 MMOL/L (ref 22–29)
CREAT SERPL-MCNC: 2.85 MG/DL (ref 0.57–1)
CROSSMATCH INTERPRETATION: NORMAL
CRP SERPL-MCNC: 1.65 MG/DL (ref 0–0.5)
D-LACTATE SERPL-SCNC: 2.3 MMOL/L (ref 0.5–2)
D-LACTATE SERPL-SCNC: 2.3 MMOL/L (ref 0.5–2)
D-LACTATE SERPL-SCNC: 2.9 MMOL/L (ref 0.5–2)
D-LACTATE SERPL-SCNC: 3.1 MMOL/L (ref 0.5–2)
DEPRECATED RDW RBC AUTO: 57.1 FL (ref 37–54)
EGFRCR SERPLBLD CKD-EPI 2021: 17.7 ML/MIN/1.73
EOSINOPHIL # BLD AUTO: 0.06 10*3/MM3 (ref 0–0.4)
EOSINOPHIL NFR BLD AUTO: 0.6 % (ref 0.3–6.2)
ERYTHROCYTE [DISTWIDTH] IN BLOOD BY AUTOMATED COUNT: 15.9 % (ref 12.3–15.4)
GLOBULIN UR ELPH-MCNC: 1.7 GM/DL
GLUCOSE BLDC GLUCOMTR-MCNC: 148 MG/DL (ref 70–130)
GLUCOSE BLDC GLUCOMTR-MCNC: 161 MG/DL (ref 70–130)
GLUCOSE BLDC GLUCOMTR-MCNC: 185 MG/DL (ref 70–130)
GLUCOSE BLDC GLUCOMTR-MCNC: 195 MG/DL (ref 70–130)
GLUCOSE BLDC GLUCOMTR-MCNC: 210 MG/DL (ref 70–130)
GLUCOSE BLDC GLUCOMTR-MCNC: 52 MG/DL (ref 70–130)
GLUCOSE BLDC GLUCOMTR-MCNC: 59 MG/DL (ref 70–130)
GLUCOSE BLDC GLUCOMTR-MCNC: 72 MG/DL (ref 70–130)
GLUCOSE SERPL-MCNC: 229 MG/DL (ref 65–99)
HCT VFR BLD AUTO: 24.9 % (ref 34–46.6)
HGB BLD-MCNC: 8 G/DL (ref 12–15.9)
IMM GRANULOCYTES # BLD AUTO: 0.08 10*3/MM3 (ref 0–0.05)
IMM GRANULOCYTES NFR BLD AUTO: 0.8 % (ref 0–0.5)
LYMPHOCYTES # BLD AUTO: 0.85 10*3/MM3 (ref 0.7–3.1)
LYMPHOCYTES NFR BLD AUTO: 8.5 % (ref 19.6–45.3)
MAGNESIUM SERPL-MCNC: 1.3 MG/DL (ref 1.6–2.4)
MCH RBC QN AUTO: 31.3 PG (ref 26.6–33)
MCHC RBC AUTO-ENTMCNC: 32.1 G/DL (ref 31.5–35.7)
MCV RBC AUTO: 97.3 FL (ref 79–97)
MONOCYTES # BLD AUTO: 0.2 10*3/MM3 (ref 0.1–0.9)
MONOCYTES NFR BLD AUTO: 2 % (ref 5–12)
NEUTROPHILS NFR BLD AUTO: 8.76 10*3/MM3 (ref 1.7–7)
NEUTROPHILS NFR BLD AUTO: 87.9 % (ref 42.7–76)
NRBC BLD AUTO-RTO: 0 /100 WBC (ref 0–0.2)
PLATELET # BLD AUTO: 132 10*3/MM3 (ref 140–450)
PMV BLD AUTO: 10.2 FL (ref 6–12)
POTASSIUM SERPL-SCNC: 3.3 MMOL/L (ref 3.5–5.2)
PROCALCITONIN SERPL-MCNC: 74.29 NG/ML (ref 0–0.25)
PROT SERPL-MCNC: 3.1 G/DL (ref 6–8.5)
RBC # BLD AUTO: 2.56 10*6/MM3 (ref 3.77–5.28)
SODIUM SERPL-SCNC: 136 MMOL/L (ref 136–145)
UNIT  ABO: NORMAL
UNIT  RH: NORMAL
WBC NRBC COR # BLD AUTO: 9.97 10*3/MM3 (ref 3.4–10.8)

## 2024-08-12 PROCEDURE — 63710000001 INSULIN LISPRO (HUMAN) PER 5 UNITS: Performed by: HOSPITALIST

## 2024-08-12 PROCEDURE — P9047 ALBUMIN (HUMAN), 25%, 50ML: HCPCS | Performed by: HOSPITALIST

## 2024-08-12 PROCEDURE — 71045 X-RAY EXAM CHEST 1 VIEW: CPT

## 2024-08-12 PROCEDURE — 82948 REAGENT STRIP/BLOOD GLUCOSE: CPT

## 2024-08-12 PROCEDURE — 25810000003 SODIUM CHLORIDE 0.9 % SOLUTION: Performed by: HOSPITALIST

## 2024-08-12 PROCEDURE — 83605 ASSAY OF LACTIC ACID: CPT | Performed by: HOSPITALIST

## 2024-08-12 PROCEDURE — 25010000002 POTASSIUM CHLORIDE 10 MEQ/100ML SOLUTION: Performed by: INTERNAL MEDICINE

## 2024-08-12 PROCEDURE — 83735 ASSAY OF MAGNESIUM: CPT | Performed by: INTERNAL MEDICINE

## 2024-08-12 PROCEDURE — 85025 COMPLETE CBC W/AUTO DIFF WBC: CPT

## 2024-08-12 PROCEDURE — 25010000002 HYDROCORTISONE SOD SUC (PF) 100 MG RECONSTITUTED SOLUTION: Performed by: HOSPITALIST

## 2024-08-12 PROCEDURE — 25010000002 PHENYLEPHRINE 10 MG/ML SOLUTION: Performed by: HOSPITALIST

## 2024-08-12 PROCEDURE — 99231 SBSQ HOSP IP/OBS SF/LOW 25: CPT | Performed by: SURGERY

## 2024-08-12 PROCEDURE — 71045 X-RAY EXAM CHEST 1 VIEW: CPT | Performed by: RADIOLOGY

## 2024-08-12 PROCEDURE — 87040 BLOOD CULTURE FOR BACTERIA: CPT | Performed by: HOSPITALIST

## 2024-08-12 PROCEDURE — 92610 EVALUATE SWALLOWING FUNCTION: CPT

## 2024-08-12 PROCEDURE — 99233 SBSQ HOSP IP/OBS HIGH 50: CPT | Performed by: NURSE PRACTITIONER

## 2024-08-12 PROCEDURE — 25010000002 HEPARIN (PORCINE) PER 1000 UNITS: Performed by: HOSPITALIST

## 2024-08-12 PROCEDURE — 99233 SBSQ HOSP IP/OBS HIGH 50: CPT | Performed by: HOSPITALIST

## 2024-08-12 PROCEDURE — 86140 C-REACTIVE PROTEIN: CPT | Performed by: HOSPITALIST

## 2024-08-12 PROCEDURE — 80053 COMPREHEN METABOLIC PANEL: CPT | Performed by: INTERNAL MEDICINE

## 2024-08-12 PROCEDURE — 36556 INSERT NON-TUNNEL CV CATH: CPT | Performed by: SURGERY

## 2024-08-12 PROCEDURE — 25010000002 ALBUMIN HUMAN 25% PER 50 ML: Performed by: HOSPITALIST

## 2024-08-12 PROCEDURE — 84145 PROCALCITONIN (PCT): CPT | Performed by: HOSPITALIST

## 2024-08-12 PROCEDURE — 25010000002 VANCOMYCIN 5 G RECONSTITUTED SOLUTION: Performed by: HOSPITALIST

## 2024-08-12 PROCEDURE — 94799 UNLISTED PULMONARY SVC/PX: CPT

## 2024-08-12 RX ORDER — POTASSIUM CHLORIDE 7.45 MG/ML
10 INJECTION INTRAVENOUS
Status: COMPLETED | OUTPATIENT
Start: 2024-08-12 | End: 2024-08-12

## 2024-08-12 RX ORDER — MUPIROCIN 20 MG/G
1 OINTMENT TOPICAL EVERY 12 HOURS SCHEDULED
Status: COMPLETED | OUTPATIENT
Start: 2024-08-13 | End: 2024-08-17

## 2024-08-12 RX ORDER — SODIUM CHLORIDE 0.9 % (FLUSH) 0.9 %
10 SYRINGE (ML) INJECTION AS NEEDED
Status: DISCONTINUED | OUTPATIENT
Start: 2024-08-12 | End: 2024-08-21 | Stop reason: HOSPADM

## 2024-08-12 RX ORDER — MIDODRINE HYDROCHLORIDE 2.5 MG/1
7.5 TABLET ORAL
Status: DISCONTINUED | OUTPATIENT
Start: 2024-08-12 | End: 2024-08-15

## 2024-08-12 RX ORDER — ALBUMIN (HUMAN) 12.5 G/50ML
50 SOLUTION INTRAVENOUS ONCE
Status: COMPLETED | OUTPATIENT
Start: 2024-08-12 | End: 2024-08-12

## 2024-08-12 RX ORDER — SODIUM CHLORIDE 0.9 % (FLUSH) 0.9 %
10 SYRINGE (ML) INJECTION EVERY 12 HOURS SCHEDULED
Status: DISCONTINUED | OUTPATIENT
Start: 2024-08-12 | End: 2024-08-21 | Stop reason: HOSPADM

## 2024-08-12 RX ORDER — SODIUM CHLORIDE 9 MG/ML
40 INJECTION, SOLUTION INTRAVENOUS AS NEEDED
Status: DISCONTINUED | OUTPATIENT
Start: 2024-08-12 | End: 2024-08-21 | Stop reason: HOSPADM

## 2024-08-12 RX ORDER — SODIUM CHLORIDE 0.9 % (FLUSH) 0.9 %
20 SYRINGE (ML) INJECTION AS NEEDED
Status: DISCONTINUED | OUTPATIENT
Start: 2024-08-12 | End: 2024-08-21 | Stop reason: HOSPADM

## 2024-08-12 RX ORDER — PHENYLEPHRINE HCL IN 0.9% NACL 0.5 MG/5ML
.5-3 SYRINGE (ML) INTRAVENOUS
Status: DISCONTINUED | OUTPATIENT
Start: 2024-08-13 | End: 2024-08-15

## 2024-08-12 RX ORDER — NOREPINEPHRINE BITARTRATE 0.03 MG/ML
.02-.3 INJECTION, SOLUTION INTRAVENOUS
Status: DISCONTINUED | OUTPATIENT
Start: 2024-08-12 | End: 2024-08-15

## 2024-08-12 RX ADMIN — LACOSAMIDE 50 MG: 50 TABLET, FILM COATED ORAL at 20:13

## 2024-08-12 RX ADMIN — FLUOXETINE HYDROCHLORIDE 10 MG: 10 CAPSULE ORAL at 09:43

## 2024-08-12 RX ADMIN — ALBUMIN (HUMAN) 50 G: 0.25 INJECTION, SOLUTION INTRAVENOUS at 22:52

## 2024-08-12 RX ADMIN — PANTOPRAZOLE SODIUM 40 MG: 40 TABLET, DELAYED RELEASE ORAL at 08:23

## 2024-08-12 RX ADMIN — TRAZODONE HYDROCHLORIDE 25 MG: 50 TABLET ORAL at 20:13

## 2024-08-12 RX ADMIN — DEXTROSE MONOHYDRATE 25 G: 25 INJECTION, SOLUTION INTRAVENOUS at 17:40

## 2024-08-12 RX ADMIN — MIDODRINE HYDROCHLORIDE 7.5 MG: 2.5 TABLET ORAL at 17:35

## 2024-08-12 RX ADMIN — PHENYLEPHRINE HYDROCHLORIDE 0.5 MCG/KG/MIN: 10 INJECTION INTRAVENOUS at 23:10

## 2024-08-12 RX ADMIN — SODIUM BICARBONATE: 84 INJECTION INTRAVENOUS at 10:17

## 2024-08-12 RX ADMIN — POTASSIUM CHLORIDE 10 MEQ: 7.46 INJECTION, SOLUTION INTRAVENOUS at 12:19

## 2024-08-12 RX ADMIN — LACOSAMIDE 50 MG: 50 TABLET, FILM COATED ORAL at 08:23

## 2024-08-12 RX ADMIN — FERROUS SULFATE TAB 325 MG (65 MG ELEMENTAL FE) 325 MG: 325 (65 FE) TAB at 08:23

## 2024-08-12 RX ADMIN — POTASSIUM CHLORIDE 10 MEQ: 7.46 INJECTION, SOLUTION INTRAVENOUS at 09:43

## 2024-08-12 RX ADMIN — Medication 10 ML: at 08:24

## 2024-08-12 RX ADMIN — INSULIN LISPRO 2 UNITS: 100 INJECTION, SOLUTION INTRAVENOUS; SUBCUTANEOUS at 08:24

## 2024-08-12 RX ADMIN — NOREPINEPHRINE BITARTRATE 0.02 MCG/KG/MIN: 0.03 INJECTION, SOLUTION INTRAVENOUS at 08:29

## 2024-08-12 RX ADMIN — FOLIC ACID 1 MG: 1 TABLET ORAL at 08:23

## 2024-08-12 RX ADMIN — HEPARIN SODIUM 5000 UNITS: 5000 INJECTION INTRAVENOUS; SUBCUTANEOUS at 20:13

## 2024-08-12 RX ADMIN — VANCOMYCIN HYDROCHLORIDE 750 MG: 5 INJECTION, POWDER, LYOPHILIZED, FOR SOLUTION INTRAVENOUS at 20:55

## 2024-08-12 RX ADMIN — HEPARIN SODIUM 5000 UNITS: 5000 INJECTION INTRAVENOUS; SUBCUTANEOUS at 08:24

## 2024-08-12 RX ADMIN — SODIUM CHLORIDE 1000 ML: 9 INJECTION, SOLUTION INTRAVENOUS at 20:14

## 2024-08-12 RX ADMIN — Medication 10 ML: at 20:13

## 2024-08-12 RX ADMIN — GABAPENTIN 100 MG: 100 CAPSULE ORAL at 20:12

## 2024-08-12 RX ADMIN — POTASSIUM CHLORIDE 10 MEQ: 7.46 INJECTION, SOLUTION INTRAVENOUS at 10:17

## 2024-08-12 RX ADMIN — OXYCODONE 5 MG: 5 TABLET ORAL at 20:13

## 2024-08-12 RX ADMIN — LEVOTHYROXINE SODIUM 100 MCG: 0.1 TABLET ORAL at 08:23

## 2024-08-12 RX ADMIN — ASPIRIN 81 MG: 81 TABLET, COATED ORAL at 08:23

## 2024-08-12 RX ADMIN — HYDROCORTISONE SODIUM SUCCINATE 100 MG: 100 INJECTION, POWDER, FOR SOLUTION INTRAMUSCULAR; INTRAVENOUS at 20:13

## 2024-08-12 RX ADMIN — MIDODRINE HYDROCHLORIDE 7.5 MG: 2.5 TABLET ORAL at 12:18

## 2024-08-12 NOTE — CONSULTS
[facility]   Consult Note    Patient Name: Reny Elena  : 1960  MRN: [id number]  Primary Care Physician:  Susan Stephens APRN  Referring Physician: No ref. provider found  Date of admission: 2024    Consults  Subjective   Subjective     Reason for Consult/ Chief Complaint: IV access for pressors    History of Present Illness  Reny Elena is a 64 y.o. female who had a AKA last week. She has had to start pressors today and a central line was requested. She has had central lines in the past and had a clavicle fracture in the past.     Review of Systems see H/P    Personal History     Past Medical History:   Diagnosis Date    Arthritis     Closed fracture of right fibula and tibia 2018    Depression     Diabetic ulcer of left foot associated with type 2 diabetes mellitus 2017    Diastolic dysfunction     Disease of thyroid gland     Elevated cholesterol     End stage renal disease     Essential hypertension     GERD (gastroesophageal reflux disease)     History of transfusion     Hyperlipidemia     Iron deficiency anemia 2018    PAD (peripheral artery disease)     Renal failure     Type 2 diabetes mellitus with hyperglycemia, with long-term current use of insulin 2018       Past Surgical History:   Procedure Laterality Date    ABDOMINAL SURGERY      BACK SURGERY      Post spinal diskectomy, osteophytectomy in one lumbar interspace    CATARACT EXTRACTION      Left      SECTION      DENTAL PROCEDURE      ENDOSCOPY      FRACTURE SURGERY      right leg    HYSTERECTOMY      INCISION AND DRAINAGE LEG Left 4/15/2017    Procedure: INCISION AND DRAINAGE LOWER EXTREMITY;  Surgeon: Basilio Morris MD;  Location: [facility] OR;  Service:     INCISION AND DRAINAGE LEG Left 2017    Procedure: Irrigation and Debridment abcess diabetic wound left foot with deep culture;  Surgeon: Basilio Morris MD;  Location: [facility] OR;  Service:     INSERTION PERITONEAL DIALYSIS CATHETER N/A  12/16/2021    Procedure: INSERTION PERITONEAL DIALYSIS CATHETER LAPAROSCOPIC;  Surgeon: Edy Glover MD;  Location: [facility] OR;  Service: General;  Laterality: N/A;    KNEE ARTHROSCOPY Right     ORIF TIBIA FRACTURE Right 12/28/2018    Procedure: TIBIAL  FRACTURE OPEN REDUCTION INTERNAL FIXATION;  Surgeon: Jung Barragan MD;  Location:  [facility] OR;  Service: Orthopedics    TOE AMPUTATION Right     5th    TUBAL ABDOMINAL LIGATION         Family History: family history includes COPD in her mother; Cancer in her mother; Depression in an other family member; Diabetes in an other family member; Heart disease in her mother. Otherwise pertinent FHx was reviewed and not pertinent to current issue.    Social History:  reports that she has never smoked. She has never been exposed to tobacco smoke. She has never used smokeless tobacco. She reports that she does not drink alcohol and does not use drugs.    Home Medications:   FLUoxetine, HYDROcodone-acetaminophen, Insulin Aspart (w/Niacinamide), NIFEdipine XL, aspirin, atorvastatin, cefdinir, ferrous sulfate, fluticasone, folic acid, hydrOXYzine, lacosamide, levothyroxine, lidocaine, losartan, ondansetron ODT, pantoprazole, potassium chloride, rOPINIRole, tiZANidine, and traZODone    Allergies:  Allergies   Allergen Reactions    Morphine Nausea And Vomiting    Penicillins Hives and GI Intolerance             Codeine Hives, Rash and GI Intolerance     Pt tolerates Isabella @ home    Sulfa Antibiotics Hives, Rash and GI Intolerance     NAUSEA TOO       Objective    Objective     Vitals:  Temp:  [96 °F (35.6 °C)-98.6 °F (37 °C)] 97.5 °F (36.4 °C)  Heart Rate:  [78-88] 85  Resp:  [11-20] 20  BP: ()/(27-83) 128/65    Physical Exam  Constitutional:       Appearance: She is ill-appearing. She is not toxic-appearing.   HENT:      Right Ear: External ear normal.      Left Ear: External ear normal.      Nose: Nose normal.      Mouth/Throat:      Pharynx: Oropharynx is clear.    Cardiovascular:      Rate and Rhythm: Normal rate.      Pulses: Normal pulses.      Heart sounds: No murmur heard.  Pulmonary:      Effort: No respiratory distress.      Breath sounds: No wheezing.   Abdominal:      General: There is no distension.      Tenderness: There is no abdominal tenderness. There is no guarding.   Neurological:      Mental Status: She is alert.         Result Review    Result Review:  I have personally reviewed the results from the time of this admission to 8/12/2024 16:28 EDT and agree with these findings:  []  Laboratory list / accordion  []  Microbiology  []  Radiology  []  EKG/Telemetry   []  Cardiology/Vascular   []  Pathology  []  Old records  []  Other:  Most notable findings include:        Assessment & Plan   Assessment / Plan     Brief Patient Summary:  Reny Elena is a 64 y.o. female who who needs a central line due to being on pressors.    Active Hospital Problems:  Active Hospital Problems    Diagnosis     **Diabetic ulcer of right heel      Plan: place a central line      Edy Glover MD

## 2024-08-12 NOTE — THERAPY EVALUATION
Acute Care - Speech Language Pathology   Swallow Re-Assessment  Jose Alfredo     Patient Name: Reny Elena  : 1960  MRN: [id number]  Today's Date: 2024             Admit Date: 2024    Visit Dx:     ICD-10-CM ICD-9-CM   1. Diabetic ulcer of right heel associated with type 2 diabetes mellitus, with bone involvement without evidence of necrosis  E11.621 250.80    L97.416 707.14   2. Peritoneal dialysis finding  Z99.2 V45.11   3. Nausea and vomiting, unspecified vomiting type  R11.2 787.01   4. Acute cystitis with hematuria  N30.01 595.0   5. Ulcer of right foot, unspecified ulcer stage  L97.519 707.15     Patient Active Problem List   Diagnosis    Tibia/fibula fracture    Iron deficiency anemia    Type 2 diabetes mellitus with hyperglycemia, with long-term current use of insulin    Wound of right ankle    Cellulitis    Metabolic encephalopathy    History of non-ST elevation myocardial infarction (NSTEMI)    HTN (hypertension)    CVA (cerebral vascular accident)    Chronic diastolic CHF (congestive heart failure)    Decubitus ulcer of coccyx, unspecified pressure ulcer stage    Depression    Expressive aphasia    Impaired mobility and ADLs    Hyperkalemia    Subdural hematoma    Severe malnutrition    Cerebrovascular accident (CVA), unspecified mechanism    PRES (posterior reversible encephalopathy syndrome)    Debility    Stage 5 chronic kidney disease on chronic dialysis    Seizure-like activity    Seizure    Open wound    Pressure injury of right heel, stage 3    Pressure injury of sacral region, stage 2    Pressure injury of right heel, unstageable    Diabetic ulcer of right heel     Past Medical History:   Diagnosis Date    Arthritis     Closed fracture of right fibula and tibia 2018    Depression     Diabetic ulcer of left foot associated with type 2 diabetes mellitus 2017    Diastolic dysfunction     Disease of thyroid gland     Elevated cholesterol     End stage renal disease      Essential hypertension     GERD (gastroesophageal reflux disease)     History of transfusion     Hyperlipidemia     Iron deficiency anemia 2018    PAD (peripheral artery disease)     Renal failure     Type 2 diabetes mellitus with hyperglycemia, with long-term current use of insulin 2018     Past Surgical History:   Procedure Laterality Date    ABDOMINAL SURGERY      BACK SURGERY      Post spinal diskectomy, osteophytectomy in one lumbar interspace    CATARACT EXTRACTION      Left      SECTION      DENTAL PROCEDURE      ENDOSCOPY      FRACTURE SURGERY      right leg    HYSTERECTOMY      INCISION AND DRAINAGE LEG Left 4/15/2017    Procedure: INCISION AND DRAINAGE LOWER EXTREMITY;  Surgeon: Basilio Morris MD;  Location: [facility] OR;  Service:     INCISION AND DRAINAGE LEG Left 2017    Procedure: Irrigation and Debridment abcess diabetic wound left foot with deep culture;  Surgeon: Basilio Morris MD;  Location: [facility] OR;  Service:     INSERTION PERITONEAL DIALYSIS CATHETER N/A 2021    Procedure: INSERTION PERITONEAL DIALYSIS CATHETER LAPAROSCOPIC;  Surgeon: Edy Glover MD;  Location: [facility] OR;  Service: General;  Laterality: N/A;    KNEE ARTHROSCOPY Right     ORIF TIBIA FRACTURE Right 2018    Procedure: TIBIAL  FRACTURE OPEN REDUCTION INTERNAL FIXATION;  Surgeon: Jung Barragan MD;  Location: [facility] OR;  Service: Orthopedics    TOE AMPUTATION Right     5th    TUBAL ABDOMINAL LIGATION       Reny Elena  was seen at bedside this am on 3N for diet safety/acceptance. She is s/p initial clinical dysphagia assessment 24 w/ recommendation for modified po diet of mechanical soft consistencies, ground meats, thin liquids. She is s/p R AKA 24.     She was observed on ra w/o complications.     She was resting upon SLP entry, awakened to verbal stimuli. She followed simple directives and participated in simple conversational exchanges w/ SLP initiation and cues. She was  fatigued and generally weak, reported persistent poor appetite. She does receive oral supplements.      Patient was positioned upright and centered in bed to accept multiple po presentations of solid cracker and thin water via straw. SLP assisted w/ po presentations.       Upon po presentations, adequate bolus anticipation and acceptance w/ good labial seal for bolus clearance and suction via straw. Bolus formation, manipulation and control were moderately weak w/ increased oral prep time w/ solids, mixed phasic/rotary mastication pattern. A-p transit was mildly delayed w/o significant oral residue. No overt s/s aspiration before the swallow.       Pharyngeal swallow was timely w/ adequate hyolaryngeal elevation per palpation. No overt s/s aspiration evidenced across this assessment. No silent aspiration suspected. Patient denied odynophagia.     Impression: Per this reassessment, Ms. Elena continued w/ mild oral dysphagia, wfl pharyngeal swallow w/o s/s aspiration. No s/s indicative of silent aspiration.     She is felt to most benefit from a continuing a modified po diet of mechanical soft consistencies, slight upgrade to chopped meats, thin liquids. This is felt to be least restrictive for her per continued fatigue and generalized weakness per persistent medical status.       SLP Recommendation and Plan  1. Mechanical soft consistencies, chopped meats, thin liquids.   2. Medications whole in puree, crushed prn.   3. Fully a/a, upright and centered for all po intake.  4. FARZANEH precautions.  5. Oral care protocol.  6. Assist w/ po intake prn.   SLP to sign off at this time as she is accepting what is felt to be her least restrictive modified po diet.      D/w patient results and recommendations w/ verbal agreement.      Thank you for allowing me to participate in the care of your patient-  Patti Francois M.A., CCC-SLP    EDUCATION  The patient has been educated in the following areas:   Dysphagia (Swallowing  Impairment) Oral Care/Hydration Modified Diet Instruction.      Time Calculation:     Therapy Charges for Today       Code Description Service Date Service Provider Modifiers Qty    [id number]  ST EVAL ORAL PHARYNG SWALLOW 4 8/12/2024 Patti Francois MA,CCC-SLP GN 1          Patti Francois MA,CCC-SLP  8/12/2024

## 2024-08-12 NOTE — PROGRESS NOTES
"Nephrology Progress Note      Subjective   Patient stated she has been trying to eat more, she had peritoneal dialysis last night and developed hypotension and has been started on midodrine with the plan to start on pressors if blood pressure does not improve.    Objective       Vital signs :     Temp:  [96 °F (35.6 °C)-98.6 °F (37 °C)] 96 °F (35.6 °C)  Heart Rate:  [77-88] 81  Resp:  [15-20] 15  BP: ()/(41-80) 106/72    Intake/Output                               08/10/24 0701 - 08/11/24 0700 08/11/24 0701 - 08/12/24 0700 08/12/24 0701 - 08/13/24 0700     9115-5913 2918-9663 Total 5312-5658 9022-3528 Total 9887-5912 1751-6830 Total                    Intake    P.O.  240  140 380  0  180 180  --  -- --    I.V.  0  -- 0  250  -- 250  --  -- --    Blood  --  -- --  300  -- 300  --  -- --    Volume (Transfuse RBC Infuse Each Unit Over: 3.5H) -- -- -- 300 -- 300 -- -- --    Total Intake 240 140 380 550 180 730 -- -- --       Output    Dialysis  218  -- 218  --  -- --  581  -- 581    Total Output 218 -- 218 -- -- -- 581 -- 581             Physical Exam:    General Appearance : alert, pleasant, appears stated age, cooperative and oriented  Lungs : clear to auscultation, respirations regular  Heart :  regular rhythm & normal rate, normal S1, S2 and no murmur, no rub  Abdomen : Soft, nondistended PD catheter site clear  Extremities : 1+ to trace edema, right AKA with dressing  Neurologic :   orientated to person, place, time and situation, Grossly no focal deficits        Laboratory Data :     Albumin Albumin   Date Value Ref Range Status   08/12/2024 1.4 (L) 3.5 - 5.2 g/dL Final   08/11/2024 1.6 (L) 3.5 - 5.2 g/dL Final   08/11/2024 1.6 (L) 3.5 - 5.2 g/dL Final        Magnesium Magnesium   Date Value Ref Range Status   08/12/2024 1.3 (L) 1.6 - 2.4 mg/dL Final   08/11/2024 1.3 (L) 1.6 - 2.4 mg/dL Final          PTH               No results found for: \"PTH\"    CBC and coagulation:  Results from last 7 days   Lab Units " 08/12/24  0721 08/12/24  0312 08/11/24  1714 08/11/24  1252 08/11/24  0744 08/10/24  0528 08/09/24  0049   LACTATE mmol/L 2.9*  --   --   --   --   --   --    WBC 10*3/mm3  --  9.97  --  10.73 9.08   < > 5.34   HEMOGLOBIN g/dL  --  8.0* 8.6* 6.5* 7.0*   < > 9.5*   HEMATOCRIT %  --  24.9* 27.0* 20.2* 22.3*   < > 31.6*   MCV fL  --  97.3*  --  97.1* 100.0*   < > 102.3*   MCHC g/dL  --  32.1  --  32.2 31.4*   < > 30.1*   PLATELETS 10*3/mm3  --  132*  --  161 140   < > 149   INR   --   --   --   --   --   --  0.96    < > = values in this interval not displayed.     Acid/base balance:  Results from last 7 days   Lab Units 08/11/24  1214   PH, ARTERIAL pH units 7.423   PO2 ART mm Hg 106.0   PCO2, ARTERIAL mm Hg 28.7*   HCO3 ART mmol/L 18.7*     Renal and electrolytes:    Results from last 7 days   Lab Units 08/12/24  0312 08/11/24  1252 08/11/24  0744 08/10/24  0406 08/09/24  0049   SODIUM mmol/L 136 132* 135* 135* 136   POTASSIUM mmol/L 3.3* 3.7 3.5 3.2* 4.0   MAGNESIUM mg/dL 1.3*  --  1.3*  --   --    CHLORIDE mmol/L 104 100 105 103 103   CO2 mmol/L 15.9* 18.4* 18.9* 19.0* 19.6*   BUN mg/dL 18 21 18 16 18   CREATININE mg/dL 2.85* 3.22* 3.12* 2.80* 3.05*   CALCIUM mg/dL 5.7* 6.0* 6.0* 6.5* 6.9*     Estimated Creatinine Clearance: 13 mL/min (A) (by C-G formula based on SCr of 2.85 mg/dL (H)).  @GFRCG:3@   Liver and pancreatic function:  Results from last 7 days   Lab Units 08/12/24 0312 08/11/24 1714 08/11/24 1252 08/11/24  0744   ALBUMIN g/dL 1.4*  --  1.6* 1.6*   BILIRUBIN mg/dL 0.3  --  0.3 0.3   ALK PHOS U/L 196*  --  181* 182*   AST (SGOT) U/L 2,747*  --  1,377* 965*   ALT (SGPT) U/L 322*  --  197* 147*   AMMONIA umol/L  --  22  --   --          Cardiac:      Liver and pancreatic function:  Results from last 7 days   Lab Units 08/12/24 0312 08/11/24 1714 08/11/24 1252 08/11/24  0744   ALBUMIN g/dL 1.4*  --  1.6* 1.6*   BILIRUBIN mg/dL 0.3  --  0.3 0.3   ALK PHOS U/L 196*  --  181* 182*   AST (SGOT) U/L 2,747*   --  1,377* 965*   ALT (SGPT) U/L 322*  --  197* 147*   AMMONIA umol/L  --  22  --   --        Medications :     aspirin, 81 mg, Oral, Daily  [Held by provider] atorvastatin, 80 mg, Oral, Nightly  cefTRIAXone, 1,000 mg, Intravenous, Q24H  ferrous sulfate, 325 mg, Oral, Daily With Breakfast  FLUoxetine, 10 mg, Oral, Daily  folic acid, 1 mg, Oral, Daily  gabapentin, 100 mg, Oral, Nightly  heparin (porcine), 5,000 Units, Subcutaneous, Q12H  insulin lispro, 2-7 Units, Subcutaneous, 4x Daily AC & at Bedtime  lacosamide, 50 mg, Oral, Q12H  levothyroxine, 100 mcg, Oral, QAM AC  midodrine, 7.5 mg, Oral, TID AC  pantoprazole, 40 mg, Oral, QAM AC  sodium chloride, 1,000 mL, Intravenous, Once  sodium chloride, 10 mL, Intravenous, Q12H  traZODone, 25 mg, Oral, Nightly      norepinephrine, 0.02-0.3 mcg/kg/min  sodium chloride 0.45 % 1,000 mL with sodium bicarbonate 8.4 % 75 mEq infusion,             Assessment & Plan       -End-stage renal disease on peritoneal dialysis  -Hypotension  -Anemia  -Hypokalemia  -Type 2 diabetes mellitus with renal involvement  - Persistent nausea and vomiting  - Cachexia and malnutrition    Discussed the case in detail with Dr. Jacinto, strongly recommended to start on enteral tube feed.  Discussed with patient herself and tried to convince to insert tube feed but she declined.    We should avoid peripheral catheters and central line can be an option if needed    Hypokalemia, will continue on potassium replacement.  I have ordered 30 mEq of potassium IV    Metabolic acidosis, continue on bicarb fluid    Discussed with Dr. Javier    Discussed with RN    Please avoid nephrotoxic medication and pharmacy to adjust the medication according to the GFR.      Plan of care was discussed with the patient. Patient understood, verbalized, agreed.      Nicholas Arroyo MD  08/12/24  08:29 EDT

## 2024-08-12 NOTE — PROCEDURES
Dx Hypotension    Physician Dr Glover    Procedure  The left shoulder was prepped and draped. Local was injected near the clavicle. The subclavian vein was accessed and the wire threaded in. The dilator was placed and the catheter threaded in . It was sutured in and a dressing applied. There was good blood return and it flushed easily.

## 2024-08-12 NOTE — PLAN OF CARE
Goal Outcome Evaluation:      VSS on room air. Levo and IVF infusing per orders - see mar documentation. Plan of care ongoing. Safety maintained, call light in reach.

## 2024-08-12 NOTE — PROGRESS NOTES
[facility] HOSPITALIST PROGRESS NOTE     Patient Identification:  Name:  Reny Elena  Age:  64 y.o.  Sex:  female  :  1960  MRN:  [id number]  Visit Number:  [id number]  Primary Care Provider:  Susan Stephens APRN    Length of stay:  9    Subjective: Patient seen and examined, patient is awake, noted on chart patient has?  BP of 57/42 with a heart rate of 88???  But is awake not sure if it is accurate patient does have borderline hypertension.  Patient is transferred this morning to PCU for close monitoring, may need pressor support if necessary there is worsening of transaminases predominantly AST, ultrasound of the liver shows gallbladder sludge otherwise negative.  Total bili is normal.  Patient has been borderline hypotensive, suspect transaminase elevation related to ischemia from hypertension.  Currently patient is awake and alert, answers appropriately.    Chief Complaint: Hypotension  ----------------------------------------------------------------------------------------------------------------------  Current Hospital Meds:  aspirin, 81 mg, Oral, Daily  [Held by provider] atorvastatin, 80 mg, Oral, Nightly  cefTRIAXone, 1,000 mg, Intravenous, Q24H  ferrous sulfate, 325 mg, Oral, Daily With Breakfast  FLUoxetine, 10 mg, Oral, Daily  folic acid, 1 mg, Oral, Daily  gabapentin, 100 mg, Oral, Nightly  heparin (porcine), 5,000 Units, Subcutaneous, Q12H  insulin lispro, 2-7 Units, Subcutaneous, 4x Daily AC & at Bedtime  lacosamide, 50 mg, Oral, Q12H  levothyroxine, 100 mcg, Oral, QAM AC  midodrine, 7.5 mg, Oral, TID AC  pantoprazole, 40 mg, Oral, QAM AC  potassium chloride, 10 mEq, Intravenous, Q1H  sodium chloride, 1,000 mL, Intravenous, Once  sodium chloride, 10 mL, Intravenous, Q12H  traZODone, 25 mg, Oral, Nightly      norepinephrine, 0.02-0.3 mcg/kg/min, Last Rate: 0.02 mcg/kg/min (24)  sodium chloride 0.45 % 1,000 mL with sodium bicarbonate 8.4 % 75 mEq infusion,        ----------------------------------------------------------------------------------------------------------------------  Vital Signs:  Temp:  [96 °F (35.6 °C)-98.6 °F (37 °C)] 96 °F (35.6 °C)  Heart Rate:  [77-88] 78  Resp:  [15-20] 15  BP: ()/(41-80) 98/58       Tele: Sinus rhythm 78 bpm      08/11/24  0500 08/12/24  0510 08/12/24  0740   Weight: 41.9 kg (92 lb 6 oz) 42.4 kg (93 lb 8 oz) 42.5 kg (93 lb 12.8 oz)     Body mass index is 16.1 kg/m².    Intake/Output Summary (Last 24 hours) at 8/12/2024 0915  Last data filed at 8/12/2024 0723  Gross per 24 hour   Intake 730 ml   Output 581 ml   Net 149 ml     Diet: Diabetic; Consistent Carbohydrate; Fluid Consistency: Thin (IDDSI 0)  ----------------------------------------------------------------------------------------------------------------------  Physical exam:  General: Chronically ill-appearing, pale, oriented to self place and time she denies any pain denies shortness of breath.  She is not confused.  She is not lethargic    Skin:  Skin is warm and dry. No rash noted.  Pale   HENT:  Head:  Normocephalic and atraumatic.  Mouth:  Moist mucous membranes.    Eyes:  Conjunctivae and EOM are normal.  Pupils are equal, round, and reactive to light.  No scleral icterus.    Neck:  Neck supple.  No JVD present.    Pulmonary/Chest:  No respiratory distress, no wheezes, no crackles, with normal breath sounds and good air movement.  Cardiovascular:  Normal rate, regular rhythm and normal heart sounds with no murmur.  Abdominal: PD catheter in place soft.  Bowel sounds are normal.  No distension and no tenderness.   Extremities: Patient has edema both upper and lower extremity from hypoalbuminemia, pedal pulses palpable bilateral, right lower extremity above-knee amputee.  No bleeding, dressing in place.  Neurological:  Motor strength equal no obvious deficit, sensory grossly intact.   No cranial nerve deficit.  No tongue deviation.  No facial droop.  No slurred  "speech.    Genitourinary: No Doshi catheter  Back:  ----------------------------------------------------------------------------------------------------------------------  ----------------------------------------------------------------------------------------------------------------------  Results from last 7 days   Lab Units 08/11/24  0744 08/06/24  2031 08/06/24  1817   CK TOTAL U/L 90  --   --    HSTROP T ng/L  --  121* 124*     Results from last 7 days   Lab Units 08/12/24  0721 08/12/24  0312 08/11/24  1714 08/11/24  1252 08/11/24  0744 08/10/24  0528 08/09/24  0049   LACTATE mmol/L 2.9*  --   --   --   --   --   --    WBC 10*3/mm3  --  9.97  --  10.73 9.08   < > 5.34   HEMOGLOBIN g/dL  --  8.0* 8.6* 6.5* 7.0*   < > 9.5*   HEMATOCRIT %  --  24.9* 27.0* 20.2* 22.3*   < > 31.6*   MCV fL  --  97.3*  --  97.1* 100.0*   < > 102.3*   MCHC g/dL  --  32.1  --  32.2 31.4*   < > 30.1*   PLATELETS 10*3/mm3  --  132*  --  161 140   < > 149   INR   --   --   --   --   --   --  0.96    < > = values in this interval not displayed.     Results from last 7 days   Lab Units 08/11/24  1214   PH, ARTERIAL pH units 7.423   PO2 ART mm Hg 106.0   PCO2, ARTERIAL mm Hg 28.7*   HCO3 ART mmol/L 18.7*     Results from last 7 days   Lab Units 08/12/24  0312 08/11/24  1252 08/11/24  0744   SODIUM mmol/L 136 132* 135*   POTASSIUM mmol/L 3.3* 3.7 3.5   MAGNESIUM mg/dL 1.3*  --  1.3*   CHLORIDE mmol/L 104 100 105   CO2 mmol/L 15.9* 18.4* 18.9*   BUN mg/dL 18 21 18   CREATININE mg/dL 2.85* 3.22* 3.12*   CALCIUM mg/dL 5.7* 6.0* 6.0*   GLUCOSE mg/dL 229* 146* 62*   ALBUMIN g/dL 1.4* 1.6* 1.6*   BILIRUBIN mg/dL 0.3 0.3 0.3   ALK PHOS U/L 196* 181* 182*   AST (SGOT) U/L 2,747* 1,377* 965*   ALT (SGPT) U/L 322* 197* 147*   Estimated Creatinine Clearance: 13 mL/min (A) (by C-G formula based on SCr of 2.85 mg/dL (H)).    Ammonia   Date Value Ref Range Status   08/11/2024 22 11 - 51 umol/L Final         No results found for: \"BLOODCX\"  No results " "found for: \"URINECX\"  No results found for: \"WOUNDCX\"  No results found for: \"STOOLCX\"    I have personally looked at the labs and they are summarized above.  ----------------------------------------------------------------------------------------------------------------------  Imaging Results (Last 24 Hours)       Procedure Component Value Units Date/Time    US Liver [[id number]] Collected: 08/11/24 1655     Updated: 08/11/24 1659    Narrative:      PROCEDURE: Abdominal ultrasound evaluation performed on August 11, 2024.  Color flow imaging performed.     HISTORY: Elevated liver function tests.     COMPARISON: None.     FINDINGS:     Echogenic liver suggestive of fatty infiltration.  Small volume of free fluid adjacent to the liver consistent with  ascites.  Patent hepatic veins with appropriate direction of flow.  Patent main portal vein with appropriate direction of flow.  No hepatic mass  No dilated intrahepatic bile ducts  Sludge noted in the gallbladder lumen.  No gallbladder wall thickening or para cholecystic fluid.  No right renal hydronephrosis  No features of cholecystitis.       Impression:         1.  Echogenic liver suggestive of fatty infiltration.  2.  Patent main portal vein with appropriate direction of flow.  3.  Patent hepatic veins with appropriate direction of flow.  4.  Sludge noted in the gallbladder lumen.  5.  No features of cholecystitis.  6.  Small volume of free fluid in the right upper quadrant consistent  with ascites.  7.  No hepatic mass or cyst  8.  No dilated intrahepatic bile ducts.     This report was finalized on 8/11/2024 4:57 PM by Carlos Andrews MD.       XR Chest 1 View [[id number]] Collected: 08/11/24 1408     Updated: 08/11/24 1412    Narrative:      PROCEDURE: X-ray examination of the chest performed on August 11, 2024.  Single view. Upright position.     HISTORY: Vomiting.     COMPARISON: None.     FINDINGS:     Normal heart size  Coarsened bronchovascular pattern to the " lungs  No lobar consolidation or edema.  No pleural effusion or pneumothorax  No fracture or foreign body.       Impression:         1.  Normal heart size  2.  Coarsened bronchovascular pattern to the lungs  3.  No lobar consolidation or edema.  4.  No pleural effusion or pneumothorax  5.  Mild osteoarthritis at the glenohumeral joints.     This report was finalized on 8/11/2024 2:09 PM by Carlos Andrews MD.       CT Head Without Contrast [[id number]] Collected: 08/11/24 1406     Updated: 08/11/24 1410    Narrative:      PROCEDURE: CT of the brain performed without IV contrast on August 11, 2024. The examination was performed with 3 mm axial imaging and 3 mm  sagittal and coronal reconstruction images. The examination was  performed according to as low as reasonably achievable dose protocol.     HISTORY: Lethargy.     COMPARISON: None.     FINDINGS:     Mild to moderate generalized brain volume loss  Mild to moderate chronic small vessel ischemic type changes in the white  matter.  No acute intracranial hemorrhage, mass, infarct, or edema.  No hydrocephalus  No shift of midline structures.  No fracture or foreign body.  Minimal mucosal thickening in the ethmoid air cells.  Clear mastoid air cells.       Impression:         1.  Mild to moderate generalized brain volume loss  2.  Mild to moderate chronic small vessel ischemic type changes in the  white matter.  3.  No acute intracranial hemorrhage, mass, infarct, or edema.  4.  Minimal mucosal thickening in the ethmoid air cells.  5.  No fracture or foreign body.     This report was finalized on 8/11/2024 2:08 PM by Carlos Andrews MD.             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  -Acute blood loss anemia  -Acute transaminitis, likely secondary to ischemic hepatopathy  -History of  hypertension currently borderline hypotensive  -Severe malnutrition and hypoalbuminemia  -End-stage renal disease on PD  -Acute hypokalemia and hypomagnesemia  -History of hypothyroidism  -Severe debility  -Elevated lactate with no obvious signs of sepsis at this time likely secondary to hypotension    Patient's intake is very poor, discussed with urology and suspected that patient due to malnourishment and hypoalbuminemia causing her to have more pronounced hypotension, discussed with patient options including temporary NG tube feeding which she declines.  For now we will avoid as much as possible toxic medications, will discontinue Tylenol.  Blood pressure regimen has been discontinued for now, will request dietitian for protein supplements.  Pressor support if needed.    In the meantime replace electrolytes, continue bicarb containing fluids,    Plan discussed with patient and agreed, GLORIA Cunningham aware of plan.    Total time spent on encounter is 45 minutes including discussion with patient, nephrology service and staff to    Disposition pending      Rebeka Woodruff MD  08/12/24  09:15 EDT

## 2024-08-12 NOTE — PROGRESS NOTES
PROGRESS NOTE         Patient Identification:  Name:  Reny Elena  Age:  64 y.o.  Sex:  female  :  1960  MRN:  [id number]  Visit Number:  [id number]  Primary Care Provider:  uSsan Stephens APRN         LOS: 9 days       ----------------------------------------------------------------------------------------------------------------------  Subjective       Chief Complaints:    Vomiting        Interval History:      Patient transferred to PCU this morning due to hypotension after dialysis.  Patient drowsy but awakens easily.  Reports generalized fatigue.  Currently on room air with no apparent distress.  Lungs clear to auscultation bilaterally.  Right AKA site with staples in place, no surrounding erythema or drainage.  WBC normal at 9.97.  MRSA PCR negative.  Surgical pathology remains in process.  LFTs elevated.     Review of Systems:    Constitutional: no fever, chills and night sweats.  Generalized fatigue.  Eyes: no eye drainage, itching or redness.  HEENT: no mouth sores, dysphagia or nose bleed.  Respiratory: no for shortness of breath, cough or production of sputum.  Cardiovascular: no chest pain, no palpitations, no orthopnea.  Gastrointestinal: Denies nausea or vomiting. No diarrhea. No abdominal pain, hematemesis or rectal bleeding.  Genitourinary: peritoneal dialysis. no dysuria or polyuria.  Hematologic/lymphatic: no lymph node abnormalities, no easy bruising or easy bleeding.  Musculoskeletal: no muscle or joint pain.  Skin: R foot wound. No rash and no itching.  Neurological: no loss of consciousness, no seizure, no headache.  Behavioral/Psych: no depression or suicidal ideation.  Endocrine: no hot flashes.  Immunologic: negative.    ----------------------------------------------------------------------------------------------------------------------      Objective       Current [facility] Meds:  aspirin, 81 mg, Oral, Daily  [Held by provider] atorvastatin, 80 mg, Oral,  Nightly  cefTRIAXone, 1,000 mg, Intravenous, Q24H  ferrous sulfate, 325 mg, Oral, Daily With Breakfast  FLUoxetine, 10 mg, Oral, Daily  folic acid, 1 mg, Oral, Daily  gabapentin, 100 mg, Oral, Nightly  heparin (porcine), 5,000 Units, Subcutaneous, Q12H  insulin lispro, 2-7 Units, Subcutaneous, 4x Daily AC & at Bedtime  lacosamide, 50 mg, Oral, Q12H  levothyroxine, 100 mcg, Oral, QAM AC  midodrine, 7.5 mg, Oral, TID AC  pantoprazole, 40 mg, Oral, QAM AC  sodium chloride, 1,000 mL, Intravenous, Once  sodium chloride, 10 mL, Intravenous, Q12H  traZODone, 25 mg, Oral, Nightly      norepinephrine, 0.02-0.3 mcg/kg/min, Last Rate: 0.02 mcg/kg/min (08/12/24 0829)  sodium chloride 0.45 % 1,000 mL with sodium bicarbonate 8.4 % 75 mEq infusion, , Last Rate: 75 mL/hr at 08/12/24 1017        ----------------------------------------------------------------------------------------------------------------------    Vital Signs:  Temp:  [96 °F (35.6 °C)-98.6 °F (37 °C)] 97.5 °F (36.4 °C)  Heart Rate:  [78-88] 82  Resp:  [11-20] 18  BP: ()/(31-83) 86/66  Mean Arterial Pressure (Non-Invasive) for the past 24 hrs (Last 3 readings):   Noninvasive MAP (mmHg)   08/12/24 1445 75   08/12/24 1430 62   08/12/24 1415 66       SpO2 Percentage    08/12/24 1415 08/12/24 1430 08/12/24 1445   SpO2: 99% 100% 100%     SpO2:  [95 %-100 %] 100 %  on   ;   Device (Oxygen Therapy): room air    Body mass index is 16.1 kg/m².  Wt Readings from Last 3 Encounters:   08/12/24 42.5 kg (93 lb 12.8 oz)   07/26/24 45.4 kg (100 lb)   07/26/24 51.7 kg (114 lb)        Intake/Output Summary (Last 24 hours) at 8/12/2024 1507  Last data filed at 8/12/2024 1200  Gross per 24 hour   Intake 830 ml   Output 581 ml   Net 249 ml     Diet: Diabetic; Consistent Carbohydrate; Feeding Assistance - Nursing; Texture: Soft to Chew (NDD 3); Soft to Chew: Chopped Meat; Fluid Consistency: Thin (IDDSI  0)  ----------------------------------------------------------------------------------------------------------------------      Physical Exam:    Constitutional: Frail elderly female sitting up comfortably in bed.  On room air with no apparent distress.  Drowsy this morning.  HENT:  Head: Normocephalic and atraumatic.  Mouth:  Moist mucous membranes.    Eyes:  Conjunctivae and EOM are normal.  No scleral icterus.  Neck:  Neck supple.  No JVD present.    Cardiovascular:  Normal rate, regular rhythm and normal heart sounds with no murmur. No edema.  Pulmonary/Chest: Clear bilaterally to auscultation with diminished bases no respiratory distress, no wheezes, no crackles, with normal breath sounds and good air movement.  Abdominal:  peritoneal dialysis catheter. Soft.  Bowel sounds are normal.  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  Neurological:  Alert and oriented to person, place, and time.  No facial droop.  No slurred speech.    Skin: Right AKA, open to air with staples in place, no erythema/edema and no drainage skin is warm and dry.  No rash noted.  No pallor.         ----------------------------------------------------------------------------------------------------------------------  Results from last 7 days   Lab Units 08/11/24  0744 08/06/24 2031 08/06/24  1817   CK TOTAL U/L 90  --   --    HSTROP T ng/L  --  121* 124*         Results from last 7 days   Lab Units 08/11/24  1214   PH, ARTERIAL pH units 7.423   PO2 ART mm Hg 106.0   PCO2, ARTERIAL mm Hg 28.7*   HCO3 ART mmol/L 18.7*     Results from last 7 days   Lab Units 08/12/24  1312 08/12/24  1036 08/12/24  0721 08/12/24  0312 08/11/24  1714 08/11/24  1252 08/11/24  0744 08/10/24  0528 08/09/24  0049   LACTATE mmol/L 2.3* 3.1* 2.9*  --   --   --   --   --   --    WBC 10*3/mm3  --   --   --  9.97  --  10.73 9.08   < > 5.34   HEMOGLOBIN g/dL  --   --   --  8.0* 8.6* 6.5* 7.0*   < > 9.5*  "  HEMATOCRIT %  --   --   --  24.9* 27.0* 20.2* 22.3*   < > 31.6*   MCV fL  --   --   --  97.3*  --  97.1* 100.0*   < > 102.3*   MCHC g/dL  --   --   --  32.1  --  32.2 31.4*   < > 30.1*   PLATELETS 10*3/mm3  --   --   --  132*  --  161 140   < > 149   INR   --   --   --   --   --   --   --   --  0.96    < > = values in this interval not displayed.     Results from last 7 days   Lab Units 08/12/24  0312 08/11/24  1252 08/11/24  0744   SODIUM mmol/L 136 132* 135*   POTASSIUM mmol/L 3.3* 3.7 3.5   MAGNESIUM mg/dL 1.3*  --  1.3*   CHLORIDE mmol/L 104 100 105   CO2 mmol/L 15.9* 18.4* 18.9*   BUN mg/dL 18 21 18   CREATININE mg/dL 2.85* 3.22* 3.12*   CALCIUM mg/dL 5.7* 6.0* 6.0*   GLUCOSE mg/dL 229* 146* 62*   ALBUMIN g/dL 1.4* 1.6* 1.6*   BILIRUBIN mg/dL 0.3 0.3 0.3   ALK PHOS U/L 196* 181* 182*   AST (SGOT) U/L 2,747* 1,377* 965*   ALT (SGPT) U/L 322* 197* 147*   Estimated Creatinine Clearance: 13 mL/min (A) (by C-G formula based on SCr of 2.85 mg/dL (H)).  Ammonia   Date Value Ref Range Status   08/11/2024 22 11 - 51 umol/L Final       Glucose   Date/Time Value Ref Range Status   08/12/2024 1210 72 70 - 130 mg/dL Final   08/12/2024 0823 161 (H) 70 - 130 mg/dL Final   08/12/2024 0622 210 (H) 70 - 130 mg/dL Final   08/12/2024 0008 195 (H) 70 - 130 mg/dL Final   08/11/2024 1931 127 70 - 130 mg/dL Final   08/11/2024 1630 118 70 - 130 mg/dL Final   08/11/2024 1151 134 (H) 70 - 130 mg/dL Final   08/11/2024 1052 116 70 - 130 mg/dL Final     Lab Results   Component Value Date    HGBA1C 6.10 (H) 08/02/2024     Lab Results   Component Value Date    TSH 17.140 (H) 07/21/2024    FREET4 2.1 (H) 05/17/2024       Blood Culture   Date Value Ref Range Status   08/02/2024 No growth at 24 hours  Preliminary   08/02/2024 No growth at 24 hours  Preliminary     No results found for: \"URINECX\"  No results found for: \"WOUNDCX\"  No results found for: \"STOOLCX\"  No results found for: \"RESPCX\"  Pain Management Panel  More data exists         " Latest Ref Rng & Units 7/21/2024 4/28/2024   Pain Management Panel   Amphetamine, Urine Qual Negative Negative  Negative    Barbiturates Screen, Urine Negative Negative  Negative    Benzodiazepine Screen, Urine Negative Negative  Negative    Buprenorphine, Screen, Urine Negative Negative  Negative    Cocaine Screen, Urine Negative Negative  Negative    Fentanyl, Urine Negative Negative  Negative    Methadone Screen , Urine Negative Negative  Negative    Methamphetamine, Ur Negative Negative  Negative       Details                     ----------------------------------------------------------------------------------------------------------------------  Imaging Results (Last 24 Hours)       Procedure Component Value Units Date/Time    US Liver [[id number]] Collected: 08/11/24 1655     Updated: 08/11/24 1659    Narrative:      PROCEDURE: Abdominal ultrasound evaluation performed on August 11, 2024.  Color flow imaging performed.     HISTORY: Elevated liver function tests.     COMPARISON: None.     FINDINGS:     Echogenic liver suggestive of fatty infiltration.  Small volume of free fluid adjacent to the liver consistent with  ascites.  Patent hepatic veins with appropriate direction of flow.  Patent main portal vein with appropriate direction of flow.  No hepatic mass  No dilated intrahepatic bile ducts  Sludge noted in the gallbladder lumen.  No gallbladder wall thickening or para cholecystic fluid.  No right renal hydronephrosis  No features of cholecystitis.       Impression:         1.  Echogenic liver suggestive of fatty infiltration.  2.  Patent main portal vein with appropriate direction of flow.  3.  Patent hepatic veins with appropriate direction of flow.  4.  Sludge noted in the gallbladder lumen.  5.  No features of cholecystitis.  6.  Small volume of free fluid in the right upper quadrant consistent  with ascites.  7.  No hepatic mass or cyst  8.  No dilated intrahepatic bile ducts.     This report was  finalized on 8/11/2024 4:57 PM by Carlos Andrews MD.               ----------------------------------------------------------------------------------------------------------------------    Pertinent Infectious Disease Results    Thank you Dr. Crawford for allowing us to participate in the care of your patient.  As you well know, Ms. Reny Elena is a 64 y.o. female with past medical history significant for seizure disorder, type 2 diabetes, arthritis, CHF, hypothyroidism, hypertension, hyperlipidemia, GERD, PAD, ESRD, iron deficiency anemia,, who presented to [facility] Emergency Department on 8/2/2024 for nausea, vomiting, diarrhea and worsening pain of right diabetic foot wound.  Patient was recently seen in our ED on 7/21/2024 with complaints of hallucinations, fever, and right foot pain.  At this time, patient was diagnosed with right heel ulcer and UTI.  She was sent home with an antibiotic but was unsure of the name.  Patient reported she started having nausea and vomiting after taking the antibiotics so she stopped taking them.  However, she continued to have nausea and vomiting.       Patient was tachycardic in the ED.Labs show K+ at upper limit of normal, BUN 30, cr 4.36, glucose 176, alk phos 323 and albumin 2.9, otherwise normal LFTs. CRP mildly elevated at 2.34, while lactate and WBC are normal. UA shows large leuk esterase and unable to determine RBC, WBC, bacteria of squamous cells due to loaded field.  Patient's urine culture from 7/21/2024 grew E. coli.  She had also been following with wound care who it obtained a wound culture on the same day.  This culture finalized with mixed gram-negative maurisio and light growth of Enterococcus faecalis.       X-ray of the right foot performed on 8/2/2024 revealed partial amputation of the fifth metatarsal. Bony demineralization. No acute fracture or dislocation. No lytic or blastic bony lesions seen. Diffuse interphalangeal joint space loss. Mild  degenerative changes in the midfoot. No cortical erosion. Fixation timur in the tibia/talus/calcaneus noted. Diffuse soft tissue swelling. Vascular calcifications. No soft tissue gas.  CT abdomen/pelvis revealed peritoneal dialysis catheter noted in the anterior right abdominal wall with the catheter noted to terminate in the left lateral pelvis. Small volume of free fluid in the right upper quadrant and lower posterior pelvis and right lower quadrant. Slightly elevated left hemidiaphragm. No bowel or renal obstruction. No abdominal aortic aneurysm. No features of acute appendicitis. Stranding identified in the presacral space could present changes related to mild distal colitis. Fluid-filled bladder with small volume of air in the bladder lumen. No pneumatosis. No conclusive features of free air. Distended configuration to the gallbladder likely due to fasting. No conclusive features of gastritis or enteritis.       Patient continued to have nausea and vomiting after 2 doses of Zofran.  She was admitted for further treatment.  Subsequently, patient was started on IV Rocephin in the setting of E. coli UTI.  Flagyl was added to cover possible colitis.  Patient was also started on IV vancomycin for Enterococcus faecalis wound infection.  General surgery consulted for possible debridement of patient's wound.     Assessment/Plan       Assessment     R foot wound infection, Enterococcus faecalis  Possible osteomyelitis of R foot  Hardware present in RLE  E. Coli UTI  Possible colitis         Plan      Patient transferred to PCU this morning due to hypotension after dialysis.  Patient drowsy but awakens easily.  Reports generalized fatigue.  Currently on room air with no apparent distress.  Lungs clear to auscultation bilaterally.  Right AKA site with staples in place, no surrounding erythema or drainage.  WBC normal at 9.97.  MRSA PCR negative.  Surgical pathology remains in process.  LFTs elevated.     We will continue  with vancomycin and ceftriaxone courses in the setting of E. coli UTI and gram-negative maurisio and Enterococcus faecalis wound culture.  The patient is status post AKA.  Antibiotic course pending surgical pathology report.  However if LFTs continue to rise may require discontinuation of antibiotic therapy as possible contributing source.  We will continue to monitor closely.    ANTIMICROBIAL THERAPY    cefdinir - 300 MG  cefTRIAXone (ROCEPHIN) 1000 mg IVPB in 100 mL NS (VTB)     Code Status:   Code Status and Medical Interventions: No CPR (Do Not Attempt to Resuscitate); Limited Support; No intubation (DNI)   Ordered at: 08/04/24 1645     Medical Intervention Limits:    No intubation (DNI)     Code Status (Patient has no pulse and is not breathing):    No CPR (Do Not Attempt to Resuscitate)     Medical Interventions (Patient has pulse or is breathing):    Limited Support       DARIUS Moreno  08/12/24  15:07 EDT

## 2024-08-12 NOTE — PLAN OF CARE
"Goal Outcome Evaluation:      Pt is A/O, BG normal, BP dropped at 0305 see vitals, hospitalist notified, see orders, given 1000mL bolus NS, /46 interventions effective, pt did not receive second bolus due to stable BP and hospitalist orders. Pt was A/O during this episode, CBC drawn waiting on results. Pt is resting comfortably in bed stating \"I feel good right now, just tired\" during and after episode. Pt still on peritoneal dialysis, no complaints att, will cont to monitor.                                        "

## 2024-08-12 NOTE — CASE MANAGEMENT/SOCIAL WORK
Continued Stay Note  EDWARD Veliz     Patient Name: Reny Elena  MRN: [id number]  Today's Date: 8/12/2024    Admit Date: 8/2/2024     Discharge Plan       Row Name 08/12/24 1709       Plan    Plan Pt was transferred from [address] [place] [address] to [facility] on this date. Pt had a Right AKA on 8/9/24. SS discussed short term rehab with pt on this date. Pt states to being agreeable for short term rehab once stable. SS to follow and assist with discharge planning.               CAITY Dolan

## 2024-08-13 ENCOUNTER — APPOINTMENT (OUTPATIENT)
Dept: CT IMAGING | Facility: HOSPITAL | Age: 64
DRG: 853 | End: 2024-08-13
Payer: MEDICARE

## 2024-08-13 LAB
A-A DO2: 25.4 MMHG (ref 0–300)
ALBUMIN SERPL-MCNC: 2 G/DL (ref 3.5–5.2)
ALBUMIN SERPL-MCNC: 3.5 G/DL (ref 3.5–5.2)
ALBUMIN/GLOB SERPL: 1.1 G/DL
ALBUMIN/GLOB SERPL: 2.2 G/DL
ALP SERPL-CCNC: 323 U/L (ref 39–117)
ALP SERPL-CCNC: 451 U/L (ref 39–117)
ALT SERPL W P-5'-P-CCNC: 296 U/L (ref 1–33)
ALT SERPL W P-5'-P-CCNC: 98 U/L (ref 1–33)
ANION GAP SERPL CALCULATED.3IONS-SCNC: 14.1 MMOL/L (ref 5–15)
ANION GAP SERPL CALCULATED.3IONS-SCNC: 20.7 MMOL/L (ref 5–15)
APPEARANCE FLD: CLEAR
APTT PPP: 146.4 SECONDS (ref 26.5–34.5)
ARTERIAL PATENCY WRIST A: ABNORMAL
AST SERPL-CCNC: 1449 U/L (ref 1–32)
AST SERPL-CCNC: 3281 U/L (ref 1–32)
ATMOSPHERIC PRESS: 729 MMHG
BASE EXCESS BLDA CALC-SCNC: -2.3 MMOL/L (ref 0–2)
BASOPHILS # BLD AUTO: 0.02 10*3/MM3 (ref 0–0.2)
BASOPHILS NFR BLD AUTO: 0.2 % (ref 0–1.5)
BDY SITE: ABNORMAL
BILIRUB SERPL-MCNC: 0.5 MG/DL (ref 0–1.2)
BILIRUB SERPL-MCNC: 0.7 MG/DL (ref 0–1.2)
BUN SERPL-MCNC: 19 MG/DL (ref 8–23)
BUN SERPL-MCNC: 20 MG/DL (ref 8–23)
BUN/CREAT SERPL: 6.6 (ref 7–25)
BUN/CREAT SERPL: 6.8 (ref 7–25)
CA-I SERPL ISE-MCNC: 0.74 MMOL/L (ref 1.12–1.32)
CALCIUM SPEC-SCNC: 6.5 MG/DL (ref 8.6–10.5)
CALCIUM SPEC-SCNC: 6.6 MG/DL (ref 8.6–10.5)
CHLORIDE SERPL-SCNC: 102 MMOL/L (ref 98–107)
CHLORIDE SERPL-SCNC: 99 MMOL/L (ref 98–107)
CO2 BLDA-SCNC: 23.3 MMOL/L (ref 22–33)
CO2 SERPL-SCNC: 15.3 MMOL/L (ref 22–29)
CO2 SERPL-SCNC: 17.9 MMOL/L (ref 22–29)
COHGB MFR BLD: 2.2 % (ref 0–5)
COLOR FLD: COLORLESS
CREAT SERPL-MCNC: 2.88 MG/DL (ref 0.57–1)
CREAT SERPL-MCNC: 2.96 MG/DL (ref 0.57–1)
D-LACTATE SERPL-SCNC: 1.5 MMOL/L (ref 0.5–2)
D-LACTATE SERPL-SCNC: 2.5 MMOL/L (ref 0.5–2)
D-LACTATE SERPL-SCNC: 3.5 MMOL/L (ref 0.5–2)
DEPRECATED RDW RBC AUTO: 55.5 FL (ref 37–54)
EGFRCR SERPLBLD CKD-EPI 2021: 16.9 ML/MIN/1.73
EGFRCR SERPLBLD CKD-EPI 2021: 17.5 ML/MIN/1.73
EOSINOPHIL # BLD AUTO: 0.01 10*3/MM3 (ref 0–0.4)
EOSINOPHIL NFR BLD AUTO: 0.1 % (ref 0.3–6.2)
ERYTHROCYTE [DISTWIDTH] IN BLOOD BY AUTOMATED COUNT: 16.1 % (ref 12.3–15.4)
FIBRINOGEN PPP-MCNC: 183 MG/DL (ref 173–524)
GLOBULIN UR ELPH-MCNC: 1.6 GM/DL
GLOBULIN UR ELPH-MCNC: 1.8 GM/DL
GLUCOSE BLDC GLUCOMTR-MCNC: 146 MG/DL (ref 70–130)
GLUCOSE BLDC GLUCOMTR-MCNC: 150 MG/DL (ref 70–130)
GLUCOSE BLDC GLUCOMTR-MCNC: 157 MG/DL (ref 70–130)
GLUCOSE BLDC GLUCOMTR-MCNC: 173 MG/DL (ref 70–130)
GLUCOSE BLDC GLUCOMTR-MCNC: 206 MG/DL (ref 70–130)
GLUCOSE SERPL-MCNC: 140 MG/DL (ref 65–99)
GLUCOSE SERPL-MCNC: 151 MG/DL (ref 65–99)
HCO3 BLDA-SCNC: 22.2 MMOL/L (ref 20–26)
HCT VFR BLD AUTO: 23.9 % (ref 34–46.6)
HCT VFR BLD CALC: 21.1 % (ref 38–51)
HGB BLD-MCNC: 7.9 G/DL (ref 12–15.9)
HGB BLDA-MCNC: 6.9 G/DL (ref 13.5–17.5)
IMM GRANULOCYTES # BLD AUTO: 0.1 10*3/MM3 (ref 0–0.05)
IMM GRANULOCYTES NFR BLD AUTO: 0.8 % (ref 0–0.5)
INHALED O2 CONCENTRATION: 21 %
INR PPP: 2.13 (ref 0.9–1.1)
LYMPHOCYTES # BLD AUTO: 0.15 10*3/MM3 (ref 0.7–3.1)
LYMPHOCYTES NFR BLD AUTO: 1.2 % (ref 19.6–45.3)
Lab: ABNORMAL
Lab: ABNORMAL
MCH RBC QN AUTO: 31 PG (ref 26.6–33)
MCHC RBC AUTO-ENTMCNC: 33.1 G/DL (ref 31.5–35.7)
MCV RBC AUTO: 93.7 FL (ref 79–97)
METHGB BLD QL: <-0.1 % (ref 0–3)
MODALITY: ABNORMAL
MONOCYTES # BLD AUTO: 0.26 10*3/MM3 (ref 0.1–0.9)
MONOCYTES NFR BLD AUTO: 2.1 % (ref 5–12)
NEUTROPHILS NFR BLD AUTO: 11.7 10*3/MM3 (ref 1.7–7)
NEUTROPHILS NFR BLD AUTO: 95.6 % (ref 42.7–76)
NOTIFIED BY: ABNORMAL
NOTIFIED WHO: ABNORMAL
NRBC BLD AUTO-RTO: 0 /100 WBC (ref 0–0.2)
NUC CELL # FLD: 5 /MM3
OXYHGB MFR BLDV: 95.3 % (ref 94–99)
PCO2 BLDA: 36 MM HG (ref 35–45)
PCO2 TEMP ADJ BLD: ABNORMAL MM[HG]
PH BLDA: 7.4 PH UNITS (ref 7.35–7.45)
PH, TEMP CORRECTED: ABNORMAL
PLATELET # BLD AUTO: 178 10*3/MM3 (ref 140–450)
PMV BLD AUTO: 10.5 FL (ref 6–12)
PO2 BLDA: 77.2 MM HG (ref 83–108)
PO2 TEMP ADJ BLD: ABNORMAL MM[HG]
POTASSIUM SERPL-SCNC: 3.8 MMOL/L (ref 3.5–5.2)
POTASSIUM SERPL-SCNC: 3.9 MMOL/L (ref 3.5–5.2)
PROT SERPL-MCNC: 3.8 G/DL (ref 6–8.5)
PROT SERPL-MCNC: 5.1 G/DL (ref 6–8.5)
PROTHROMBIN TIME: 23.9 SECONDS (ref 12.1–14.7)
RBC # BLD AUTO: 2.55 10*6/MM3 (ref 3.77–5.28)
RBC # FLD AUTO: NORMAL 10*3/UL
SAO2 % BLDCOA: 96.9 % (ref 94–99)
SODIUM SERPL-SCNC: 134 MMOL/L (ref 136–145)
SODIUM SERPL-SCNC: 135 MMOL/L (ref 136–145)
T4 FREE SERPL-MCNC: 1.88 NG/DL (ref 0.92–1.68)
VANCOMYCIN SERPL-MCNC: 31.1 MCG/ML (ref 5–40)
VENTILATOR MODE: ABNORMAL
WBC NRBC COR # BLD AUTO: 12.24 10*3/MM3 (ref 3.4–10.8)

## 2024-08-13 PROCEDURE — 84443 ASSAY THYROID STIM HORMONE: CPT | Performed by: INTERNAL MEDICINE

## 2024-08-13 PROCEDURE — 83010 ASSAY OF HAPTOGLOBIN QUANT: CPT | Performed by: HOSPITALIST

## 2024-08-13 PROCEDURE — 94799 UNLISTED PULMONARY SVC/PX: CPT

## 2024-08-13 PROCEDURE — 25010000002 HYDROCORTISONE SOD SUC (PF) 100 MG RECONSTITUTED SOLUTION: Performed by: INTERNAL MEDICINE

## 2024-08-13 PROCEDURE — 71250 CT THORAX DX C-: CPT

## 2024-08-13 PROCEDURE — 25010000002 PIPERACILLIN SOD-TAZOBACTAM PER 1 G: Performed by: HOSPITALIST

## 2024-08-13 PROCEDURE — 82948 REAGENT STRIP/BLOOD GLUCOSE: CPT

## 2024-08-13 PROCEDURE — 85610 PROTHROMBIN TIME: CPT | Performed by: HOSPITALIST

## 2024-08-13 PROCEDURE — 85025 COMPLETE CBC W/AUTO DIFF WBC: CPT | Performed by: HOSPITALIST

## 2024-08-13 PROCEDURE — 99291 CRITICAL CARE FIRST HOUR: CPT | Performed by: INTERNAL MEDICINE

## 2024-08-13 PROCEDURE — 82375 ASSAY CARBOXYHB QUANT: CPT

## 2024-08-13 PROCEDURE — 84439 ASSAY OF FREE THYROXINE: CPT | Performed by: INTERNAL MEDICINE

## 2024-08-13 PROCEDURE — 82805 BLOOD GASES W/O2 SATURATION: CPT

## 2024-08-13 PROCEDURE — 87070 CULTURE OTHR SPECIMN AEROBIC: CPT | Performed by: INTERNAL MEDICINE

## 2024-08-13 PROCEDURE — 87102 FUNGUS ISOLATION CULTURE: CPT | Performed by: INTERNAL MEDICINE

## 2024-08-13 PROCEDURE — 25010000002 HEPARIN (PORCINE) PER 1000 UNITS: Performed by: HOSPITALIST

## 2024-08-13 PROCEDURE — 83605 ASSAY OF LACTIC ACID: CPT | Performed by: HOSPITALIST

## 2024-08-13 PROCEDURE — 99232 SBSQ HOSP IP/OBS MODERATE 35: CPT | Performed by: NURSE PRACTITIONER

## 2024-08-13 PROCEDURE — 85730 THROMBOPLASTIN TIME PARTIAL: CPT | Performed by: HOSPITALIST

## 2024-08-13 PROCEDURE — 87205 SMEAR GRAM STAIN: CPT | Performed by: INTERNAL MEDICINE

## 2024-08-13 PROCEDURE — 82330 ASSAY OF CALCIUM: CPT | Performed by: HOSPITALIST

## 2024-08-13 PROCEDURE — 25010000002 HYDROCORTISONE SOD SUC (PF) 100 MG RECONSTITUTED SOLUTION: Performed by: HOSPITALIST

## 2024-08-13 PROCEDURE — 74176 CT ABD & PELVIS W/O CONTRAST: CPT

## 2024-08-13 PROCEDURE — 85384 FIBRINOGEN ACTIVITY: CPT | Performed by: HOSPITALIST

## 2024-08-13 PROCEDURE — 94761 N-INVAS EAR/PLS OXIMETRY MLT: CPT

## 2024-08-13 PROCEDURE — 89050 BODY FLUID CELL COUNT: CPT | Performed by: INTERNAL MEDICINE

## 2024-08-13 PROCEDURE — 63710000001 INSULIN LISPRO (HUMAN) PER 5 UNITS: Performed by: HOSPITALIST

## 2024-08-13 PROCEDURE — 80053 COMPREHEN METABOLIC PANEL: CPT | Performed by: HOSPITALIST

## 2024-08-13 PROCEDURE — 99233 SBSQ HOSP IP/OBS HIGH 50: CPT | Performed by: HOSPITALIST

## 2024-08-13 PROCEDURE — 80202 ASSAY OF VANCOMYCIN: CPT | Performed by: HOSPITALIST

## 2024-08-13 PROCEDURE — 83050 HGB METHEMOGLOBIN QUAN: CPT

## 2024-08-13 PROCEDURE — 25010000002 HYDROMORPHONE PER 4 MG: Performed by: HOSPITALIST

## 2024-08-13 PROCEDURE — 36600 WITHDRAWAL OF ARTERIAL BLOOD: CPT

## 2024-08-13 RX ORDER — HYDROMORPHONE HYDROCHLORIDE 1 MG/ML
0.5 INJECTION, SOLUTION INTRAMUSCULAR; INTRAVENOUS; SUBCUTANEOUS EVERY 8 HOURS
Status: DISCONTINUED | OUTPATIENT
Start: 2024-08-13 | End: 2024-08-13

## 2024-08-13 RX ORDER — SODIUM CHLORIDE 0.9 % (FLUSH) 0.9 %
10 SYRINGE (ML) INJECTION AS NEEDED
Status: DISCONTINUED | OUTPATIENT
Start: 2024-08-13 | End: 2024-08-21 | Stop reason: HOSPADM

## 2024-08-13 RX ORDER — SODIUM CHLORIDE 0.9 % (FLUSH) 0.9 %
10 SYRINGE (ML) INJECTION EVERY 12 HOURS SCHEDULED
Status: DISCONTINUED | OUTPATIENT
Start: 2024-08-13 | End: 2024-08-21 | Stop reason: HOSPADM

## 2024-08-13 RX ORDER — SODIUM CHLORIDE 9 MG/ML
40 INJECTION, SOLUTION INTRAVENOUS AS NEEDED
Status: DISCONTINUED | OUTPATIENT
Start: 2024-08-13 | End: 2024-08-21 | Stop reason: HOSPADM

## 2024-08-13 RX ORDER — HYDROMORPHONE HYDROCHLORIDE 1 MG/ML
0.5 INJECTION, SOLUTION INTRAMUSCULAR; INTRAVENOUS; SUBCUTANEOUS EVERY 8 HOURS PRN
Status: DISCONTINUED | OUTPATIENT
Start: 2024-08-13 | End: 2024-08-14

## 2024-08-13 RX ADMIN — FOLIC ACID 1 MG: 1 TABLET ORAL at 09:13

## 2024-08-13 RX ADMIN — MUPIROCIN 1 APPLICATION: 20 OINTMENT TOPICAL at 09:14

## 2024-08-13 RX ADMIN — Medication 10 ML: at 21:16

## 2024-08-13 RX ADMIN — LACOSAMIDE 50 MG: 50 TABLET, FILM COATED ORAL at 09:13

## 2024-08-13 RX ADMIN — ASPIRIN 81 MG: 81 TABLET, COATED ORAL at 09:13

## 2024-08-13 RX ADMIN — HYDROCORTISONE SODIUM SUCCINATE 50 MG: 100 INJECTION, POWDER, FOR SOLUTION INTRAMUSCULAR; INTRAVENOUS at 04:13

## 2024-08-13 RX ADMIN — FERROUS SULFATE TAB 325 MG (65 MG ELEMENTAL FE) 325 MG: 325 (65 FE) TAB at 09:13

## 2024-08-13 RX ADMIN — Medication 10 ML: at 09:14

## 2024-08-13 RX ADMIN — HYDROMORPHONE HYDROCHLORIDE 0.5 MG: 1 INJECTION, SOLUTION INTRAMUSCULAR; INTRAVENOUS; SUBCUTANEOUS at 18:38

## 2024-08-13 RX ADMIN — OXYCODONE 5 MG: 5 TABLET ORAL at 04:18

## 2024-08-13 RX ADMIN — PIPERACILLIN SODIUM AND TAZOBACTAM SODIUM 3.38 G: 3; .375 INJECTION, POWDER, LYOPHILIZED, FOR SOLUTION INTRAVENOUS at 21:15

## 2024-08-13 RX ADMIN — CALCIUM CHLORIDE INJECTION 1000 MG: 100 INJECTION, SOLUTION INTRAVENOUS at 16:13

## 2024-08-13 RX ADMIN — MUPIROCIN 1 APPLICATION: 20 OINTMENT TOPICAL at 21:16

## 2024-08-13 RX ADMIN — HYDROCORTISONE SODIUM SUCCINATE 50 MG: 100 INJECTION, POWDER, FOR SOLUTION INTRAMUSCULAR; INTRAVENOUS at 17:15

## 2024-08-13 RX ADMIN — LEVOTHYROXINE SODIUM 100 MCG: 0.1 TABLET ORAL at 06:45

## 2024-08-13 RX ADMIN — HEPARIN SODIUM 5000 UNITS: 5000 INJECTION INTRAVENOUS; SUBCUTANEOUS at 09:13

## 2024-08-13 RX ADMIN — OXYCODONE 5 MG: 5 TABLET ORAL at 12:27

## 2024-08-13 RX ADMIN — SODIUM BICARBONATE: 84 INJECTION INTRAVENOUS at 01:53

## 2024-08-13 RX ADMIN — INSULIN LISPRO 2 UNITS: 100 INJECTION, SOLUTION INTRAVENOUS; SUBCUTANEOUS at 11:37

## 2024-08-13 RX ADMIN — INSULIN LISPRO 2 UNITS: 100 INJECTION, SOLUTION INTRAVENOUS; SUBCUTANEOUS at 17:14

## 2024-08-13 RX ADMIN — FLUOXETINE HYDROCHLORIDE 10 MG: 10 CAPSULE ORAL at 09:13

## 2024-08-13 RX ADMIN — SODIUM BICARBONATE: 84 INJECTION INTRAVENOUS at 16:13

## 2024-08-13 RX ADMIN — MUPIROCIN 1 APPLICATION: 20 OINTMENT TOPICAL at 01:03

## 2024-08-13 RX ADMIN — INSULIN LISPRO 3 UNITS: 100 INJECTION, SOLUTION INTRAVENOUS; SUBCUTANEOUS at 06:52

## 2024-08-13 RX ADMIN — HYDROCORTISONE SODIUM SUCCINATE 50 MG: 100 INJECTION, POWDER, FOR SOLUTION INTRAMUSCULAR; INTRAVENOUS at 23:58

## 2024-08-13 RX ADMIN — PANTOPRAZOLE SODIUM 40 MG: 40 TABLET, DELAYED RELEASE ORAL at 06:45

## 2024-08-13 RX ADMIN — HYDROCORTISONE SODIUM SUCCINATE 50 MG: 100 INJECTION, POWDER, FOR SOLUTION INTRAMUSCULAR; INTRAVENOUS at 11:27

## 2024-08-13 NOTE — PLAN OF CARE
Problem: Seizure Disorder Comorbidity  Goal: Maintenance of Seizure Control  8/13/2024 1539 by Nikia Calvo RN  Outcome: Met  8/13/2024 1533 by Nikia Calvo RN  Outcome: Unable to Attain     Problem: Pain Chronic (Persistent) (Comorbidity Management)  Goal: Acceptable Pain Control and Functional Ability  8/13/2024 1539 by Nikia Calvo RN  Outcome: Met  8/13/2024 1533 by Nikia Calvo RN  Outcome: Unable to Attain     Problem: Osteoarthritis Comorbidity  Goal: Maintenance of Osteoarthritis Symptom Control  8/13/2024 1539 by Nikia Calvo RN  Outcome: Met  8/13/2024 1533 by Nikia Calvo RN  Outcome: Unable to Attain     Problem: Obstructive Sleep Apnea Risk or Actual Comorbidity Management  Goal: Unobstructed Breathing During Sleep  8/13/2024 1539 by Nikia Calvo RN  Outcome: Met  8/13/2024 1533 by Nikia Calvo RN  Outcome: Unable to Attain     Problem: Hypertension Comorbidity  Goal: Blood Pressure in Desired Range  Outcome: Unable to Attain     Problem: Heart Failure Comorbidity  Goal: Maintenance of Heart Failure Symptom Control  8/13/2024 1539 by Nikia Calvo RN  Outcome: Met  8/13/2024 1533 by Nikia Calvo RN  Outcome: Unable to Attain     Problem: COPD (Chronic Obstructive Pulmonary Disease) Comorbidity  Goal: Maintenance of COPD Symptom Control  8/13/2024 1539 by Nikia Calvo RN  Outcome: Met  8/13/2024 1533 by Nikia Calvo RN  Outcome: Unable to Attain     Problem: Asthma Comorbidity  Goal: Maintenance of Asthma Control  8/13/2024 1539 by Nikia Calvo RN  Outcome: Met  8/13/2024 1533 by Nikia Calvo RN  Outcome: Unable to Attain   Goal Outcome Evaluation:

## 2024-08-13 NOTE — PLAN OF CARE
Problem: Adult Inpatient Plan of Care  Goal: Plan of Care Review  Recent Flowsheet Documentation  Taken 8/13/2024 1618 by Roly Chinchilla RN  Progress: no change  Taken 8/13/2024 1523 by Roly Chinchilla RN  Progress: no change  Outcome Evaluation: VSS, RA, AxO but slow to respond, Bicarb continues to infuse, no acute events noted, wound care consulted, changed per orders, call light within reach, alarms active and audible, no needs noted at this time  Goal: Absence of Hospital-Acquired Illness or Injury  Intervention: Identify and Manage Fall Risk  Recent Flowsheet Documentation  Taken 8/13/2024 1500 by Roly Chinchilla RN  Safety Promotion/Fall Prevention:   activity supervised   fall prevention program maintained   safety round/check completed  Taken 8/13/2024 1400 by Roly Chinchilla RN  Safety Promotion/Fall Prevention:   activity supervised   fall prevention program maintained   safety round/check completed  Taken 8/13/2024 1300 by Roly Chinchilla RN  Safety Promotion/Fall Prevention:   activity supervised   fall prevention program maintained   safety round/check completed  Taken 8/13/2024 1200 by Roly Chinchilla RN  Safety Promotion/Fall Prevention:   activity supervised   fall prevention program maintained   safety round/check completed  Taken 8/13/2024 1100 by Roly Chinchilla RN  Safety Promotion/Fall Prevention:   activity supervised   fall prevention program maintained   safety round/check completed  Taken 8/13/2024 1000 by Roly Chinchilla RN  Safety Promotion/Fall Prevention:   activity supervised   fall prevention program maintained   safety round/check completed  Intervention: Prevent Skin Injury  Recent Flowsheet Documentation  Taken 8/13/2024 1400 by Roly Chinchilla RN  Body Position:   turned   right  Skin Protection:   adhesive use limited   incontinence pads utilized   skin-to-device areas padded   transparent dressing maintained   tubing/devices free from skin contact  Taken 8/13/2024 1200 by  Roly Chinchilla RN  Body Position:   turned   left  Taken 8/13/2024 1000 by Roly Chinchilla RN  Body Position:   turned   right  Taken 8/13/2024 0800 by Roly Chinchilla RN  Body Position:   turned   left  Skin Protection:   adhesive use limited   incontinence pads utilized   skin-to-device areas padded   transparent dressing maintained   tubing/devices free from skin contact  Intervention: Prevent and Manage VTE (Venous Thromboembolism) Risk  Recent Flowsheet Documentation  Taken 8/13/2024 1400 by Roly Chinchilla RN  Activity Management: activity encouraged  VTE Prevention/Management: (See MAR) other (see comments)  Taken 8/13/2024 0800 by Roly Chinchilla RN  Activity Management: activity encouraged  VTE Prevention/Management: (See MAR) other (see comments)  Range of Motion: active ROM (range of motion) encouraged  Intervention: Prevent Infection  Recent Flowsheet Documentation  Taken 8/13/2024 1400 by Roly Chinchilla RN  Infection Prevention:   single patient room provided   rest/sleep promoted  Taken 8/13/2024 0800 by Roly Chinchilla RN  Infection Prevention:   rest/sleep promoted   single patient room provided  Goal: Optimal Comfort and Wellbeing  Intervention: Monitor Pain and Promote Comfort  Recent Flowsheet Documentation  Taken 8/13/2024 1400 by Roly Chinchilla RN  Pain Management Interventions: see MAR  Taken 8/13/2024 0800 by Roly Chinchilla RN  Pain Management Interventions: see MAR  Intervention: Provide Person-Centered Care  Recent Flowsheet Documentation  Taken 8/13/2024 1400 by Roly Chinchilla RN  Trust Relationship/Rapport:   care explained   emotional support provided   reassurance provided   thoughts/feelings acknowledged  Taken 8/13/2024 0800 by Roly Chinchilla RN  Trust Relationship/Rapport:   care explained   emotional support provided   questions answered   reassurance provided   thoughts/feelings acknowledged   Goal Outcome Evaluation:           Progress: no change  Outcome Evaluation: VSS, RA,  AxO but slow to respond, Bicarb continues to infuse, no acute events noted, wound care consulted, changed per orders, call light within reach, alarms active and audible, no needs noted at this time

## 2024-08-13 NOTE — PROGRESS NOTES
[facility] HOSPITALIST PROGRESS NOTE     Patient Identification:  Name:  Reny Elena  Age:  64 y.o.  Sex:  female  :  1960  MRN:  [id number]  Visit Number:  [id number]  Primary Care Provider:  Susan Stephens APRN    Length of stay:  10    Subjective: Patient continued to be hypotensive yesterday becoming more tachycardic episodes of hypoglycemia, was empirically started on Zosyn and vancomycin due to leukocytosis, lactate elevation CRP and procalcitonin level.  Last night patient did require Levophed and Dawson-Synephrine, received albumin, improved now off Dawson-Synephrine since 5:00 in the morning.  Patient is lethargic and sleepy.    Chief Complaint: Septic shock  ----------------------------------------------------------------------------------------------------------------------  Current Hospital Meds:  aspirin, 81 mg, Oral, Daily  [Held by provider] atorvastatin, 80 mg, Oral, Nightly  ferrous sulfate, 325 mg, Oral, Daily With Breakfast  FLUoxetine, 10 mg, Oral, Daily  folic acid, 1 mg, Oral, Daily  heparin (porcine), 5,000 Units, Subcutaneous, Q12H  hydrocortisone sodium succinate, 50 mg, Intravenous, Q6H  insulin lispro, 2-7 Units, Subcutaneous, 4x Daily AC & at Bedtime  lacosamide, 50 mg, Oral, Q12H  levothyroxine, 100 mcg, Oral, QAM AC  midodrine, 7.5 mg, Oral, TID AC  mupirocin, 1 Application, Topical, Q12H  pantoprazole, 40 mg, Oral, QAM AC  piperacillin-tazobactam, 3.375 g, Intravenous, Q12H  sodium chloride, 1,000 mL, Intravenous, Once  sodium chloride, 10 mL, Intravenous, Q12H  sodium chloride, 10 mL, Intravenous, Q12H  sodium chloride, 10 mL, Intravenous, Q12H  sodium chloride, 10 mL, Intravenous, Q12H  sodium chloride, 10 mL, Intravenous, Q12H  traZODone, 25 mg, Oral, Nightly  Vancomycin Pharmacy Intermittent/Pulse Dosing, , Does not apply, Daily      norepinephrine, 0.02-0.3 mcg/kg/min, Last Rate: Stopped (24 0685)  Pharmacy Consult - Pharmacy to dose,   Pharmacy  Consult - Pharmacy to dose,   phenylephrine, 0.5-3 mcg/kg/min, Last Rate: Stopped (08/13/24 0519)  sodium chloride 0.45 % 1,000 mL with sodium bicarbonate 8.4 % 75 mEq infusion, , Last Rate: 75 mL/hr at 08/13/24 0153      ----------------------------------------------------------------------------------------------------------------------  Vital Signs:  Temp:  [97.6 °F (36.4 °C)-98 °F (36.7 °C)] 97.8 °F (36.6 °C)  Heart Rate:  [] 94  Resp:  [10-20] 14  BP: ()/() 131/103       Tele: Sinus rhythm 96 bpm O2 saturation 100%      08/12/24  0740 08/12/24  1525 08/13/24  0000   Weight: 42.5 kg (93 lb 12.8 oz) 42.2 kg (93 lb 0.6 oz) 42.5 kg (93 lb 12.8 oz)     Body mass index is 16.09 kg/m².    Intake/Output Summary (Last 24 hours) at 8/13/2024 1427  Last data filed at 8/13/2024 1211  Gross per 24 hour   Intake 2606.27 ml   Output --   Net 2606.27 ml     Diet: Diabetic; Consistent Carbohydrate; Feeding Assistance - Nursing; Texture: Soft to Chew (NDD 3); Soft to Chew: Chopped Meat; Fluid Consistency: Thin (IDDSI 0)  ----------------------------------------------------------------------------------------------------------------------  Physical exam:  General: Chronically ill-appearing, lethargic, answers very few questions and goes back to sleep.  No respiratory distress.    Skin:  Skin is warm and dry. No rash noted. No pallor.    HENT:  Head:  Normocephalic and atraumatic.  Mouth:  Moist mucous membranes.  Edentulous  Eyes:  Conjunctivae and EOM are normal.  Pupils are equal, round, and reactive to light.  No scleral icterus.    Neck:  Neck supple.  No JVD present.    Pulmonary/Chest:  No respiratory distress, no wheezes, no crackles, with normal breath sounds and good air movement.  Cardiovascular:  Normal rate, regular rhythm and normal heart sounds with no murmur.  Abdominal: PD catheter no drainage no redness soft.  Bowel sounds are normal.  No distension and no tenderness.   Extremities: Edema both  upper and lower extremity, upper extremity edema has worsened from fluids, right AKA stump looks clean with staples no drainage no bleeding.  Neurological: Lethargic but responds to loud verbal stimuli and painful stimuli.  No slurring of speech.    Genitourinary: No Doshi catheter  Back:  ----------------------------------------------------------------------------------------------------------------------  ----------------------------------------------------------------------------------------------------------------------  Results from last 7 days   Lab Units 08/11/24  0744 08/06/24 2031 08/06/24  1817   CK TOTAL U/L 90  --   --    HSTROP T ng/L  --  121* 124*     Results from last 7 days   Lab Units 08/13/24  0616 08/13/24  0005 08/13/24  0004 08/12/24  1918 08/12/24  1812 08/12/24  0721 08/12/24  0312 08/11/24  1714 08/11/24  1252 08/10/24  0528 08/09/24  0049   CRP mg/dL  --   --   --  1.65*  --   --   --   --   --   --   --    LACTATE mmol/L 2.5* 3.5*  --   --  2.3*   < >  --   --   --   --   --    WBC 10*3/mm3  --   --  12.24*  --   --   --  9.97  --  10.73   < > 5.34   HEMOGLOBIN g/dL  --   --  7.9*  --   --   --  8.0* 8.6* 6.5*   < > 9.5*   HEMATOCRIT %  --   --  23.9*  --   --   --  24.9* 27.0* 20.2*   < > 31.6*   MCV fL  --   --  93.7  --   --   --  97.3*  --  97.1*   < > 102.3*   MCHC g/dL  --   --  33.1  --   --   --  32.1  --  32.2   < > 30.1*   PLATELETS 10*3/mm3  --   --  178  --   --   --  132*  --  161   < > 149   INR   --   --   --   --   --   --   --   --   --   --  0.96    < > = values in this interval not displayed.     Results from last 7 days   Lab Units 08/11/24  1214   PH, ARTERIAL pH units 7.423   PO2 ART mm Hg 106.0   PCO2, ARTERIAL mm Hg 28.7*   HCO3 ART mmol/L 18.7*     Results from last 7 days   Lab Units 08/13/24  0004 08/12/24  0312 08/11/24  1252 08/11/24  0744   SODIUM mmol/L 135* 136 132* 135*   POTASSIUM mmol/L 3.9 3.3* 3.7 3.5   MAGNESIUM mg/dL  --  1.3*  --  1.3*   CHLORIDE  "mmol/L 99 104 100 105   CO2 mmol/L 15.3* 15.9* 18.4* 18.9*   BUN mg/dL 19 18 21 18   CREATININE mg/dL 2.88* 2.85* 3.22* 3.12*   CALCIUM mg/dL 6.5* 5.7* 6.0* 6.0*   GLUCOSE mg/dL 151* 229* 146* 62*   ALBUMIN g/dL 3.5 1.4* 1.6* 1.6*   BILIRUBIN mg/dL 0.7 0.3 0.3 0.3   ALK PHOS U/L 323* 196* 181* 182*   AST (SGOT) U/L 3,281* 2,747* 1,377* 965*   ALT (SGPT) U/L 296* 322* 197* 147*   Estimated Creatinine Clearance: 12.9 mL/min (A) (by C-G formula based on SCr of 2.88 mg/dL (H)).    Ammonia   Date Value Ref Range Status   08/11/2024 22 11 - 51 umol/L Final         No results found for: \"BLOODCX\"  No results found for: \"URINECX\"  No results found for: \"WOUNDCX\"  No results found for: \"STOOLCX\"    I have personally looked at the labs and they are summarized above.  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     COMPARISON:    8/11/2024     FINDINGS:    LUNGS AND PLEURAL SPACES:  Coarsened interstitial markings noted  throughout the lungs.  No consolidation.  No pneumothorax.    HEART:  Unremarkable as visualized.  No cardiomegaly.    MEDIASTINUM:  Unremarkable as visualized.  Normal mediastinal contour.    BONES/JOINTS:  Unremarkable as visualized. "  No acute fracture.    TUBES, LINES AND DEVICES:  Left subclavian central venous catheter tip  in the superior vena cava.       Impression:        No acute cardiopulmonary findings identified.        This report was finalized on 8/12/2024 4:30 PM by Dr. Harrison Biswas MD.             ----------------------------------------------------------------------------------------------------------------------  Assessment and Plan:  -Shock likely septic shock source undetermined at this time + - volume  -History of osteomyelitis status post AKA  -Normal transaminases likely from ischemic hepatitis  -Diabetes Accu-Chek at goal  -Diabetes related hypoglycemia  -Severe debility  -Severe malnutrition  -Severe hypoalbuminemia  -End-stage renal disease on PD  -Acute hypokalemia  -Acute worsening of chronic anemia post surgical, 1 unit packed RBC transfusion  -History of seizure disorder  -History of essential hypertension  -History of hypothyroidism  -Hypocalcemia    Source of sepsis is unclear at this time, discussed with nephrology to send for peritoneal  fluid studies and culture, will check CT scan of the abdomen and chest.  Continue pressor support, monitor respiratory status, empirically started yesterday with vancomycin and Zosyn, follow-up with infectious disease.  Pulmonary critical care service will be consulted.  Patient will be transferred to ICU service.  Replace electrolytes.    Plan discussed with patient, patient's significant other also came in was updated with plan of care.    Total time spent on this encounter is 40 minutes.    Disposition pending      Rebeka Woodruff MD  08/13/24  14:27 EDT

## 2024-08-13 NOTE — NURSING NOTE
"Received consult for \"AKA\"    Surgical incision noted to right stump with intact staples.  Apurva wound intact, pink and blanchable.  No erythema.  There is a small amount of serosanguineous drainage noted to patient's sheets.  No orders for post-op care noted.  Order placed to paint incision line with betadine, cover with non-adherent pads, and secure with roll gauze and ace wrap daily.    Patient continues with stage II PI to coccyx.  Order changed to treat with Z-Guard BID and leave open to air.       08/13/24 1130   Wound 08/09/24 1414 Right distal thigh Incision   Placement Date/Time: 08/09/24 1414   Side: Right  Orientation: distal  Location: thigh  Primary Wound Type: Incision   Dressing Appearance open to air   Closure Staples   Base other (see comments)  (incision line with intact staples)   Periwound intact   Periwound Temperature warm   Periwound Skin Turgor soft   Drainage Characteristics/Odor serosanguineous   Drainage Amount small       "

## 2024-08-13 NOTE — PROGRESS NOTES
"Nephrology Progress Note      Subjective   Patient has not been eating and drinking yesterday had a detailed discussion with the patient she refused to have feeding tube placed.  Patient remains hypotensive and she is being started on pressors    Objective       Vital signs :     Temp:  [97.5 °F (36.4 °C)-98 °F (36.7 °C)] 98 °F (36.7 °C)  Heart Rate:  [] 94  Resp:  [11-20] 14  BP: ()/() 142/73    Intake/Output                         08/11/24 0701 - 08/12/24 0700 08/12/24 0701 - 08/13/24 0700     7425-0055 1096-8238 Total 1624-7273 8289-9183 Total                 Intake    P.O.  0  180 180  300  -- 300    I.V.  250  -- 250  925.4  880.9 1806.3    Blood  300  -- 300  --  -- --    Volume (Transfuse RBC Infuse Each Unit Over: 3.5H) 300 -- 300 -- -- --    IV Piggyback  --  -- --  100  450 550    Total Intake 550 .4 1330.9 2656.3       Output    Dialysis  --  -- --  581  -- 581    Total Output -- -- -- 581 -- 581             Physical Exam:    General Appearance : alert, pleasant, appears stated age, cooperative and oriented  Lungs : Bilateral decreased intensity of breath sounds, bibasilar crackles  Heart :  regular rhythm & normal rate, normal S1, S2 and no murmur, no rub  Abdomen : Soft, nondistended PD catheter site clear  Extremities : 1+ to trace edema, right AKA with dressing  Neurologic :   orientated to person, place, time and situation, Grossly no focal deficits        Laboratory Data :     Albumin Albumin   Date Value Ref Range Status   08/13/2024 3.5 3.5 - 5.2 g/dL Final   08/12/2024 1.4 (L) 3.5 - 5.2 g/dL Final   08/11/2024 1.6 (L) 3.5 - 5.2 g/dL Final   08/11/2024 1.6 (L) 3.5 - 5.2 g/dL Final        Magnesium Magnesium   Date Value Ref Range Status   08/12/2024 1.3 (L) 1.6 - 2.4 mg/dL Final   08/11/2024 1.3 (L) 1.6 - 2.4 mg/dL Final          PTH               No results found for: \"PTH\"    CBC and coagulation:  Results from last 7 days   Lab Units 08/13/24  0616 08/13/24  0005 " 08/13/24  0004 08/12/24  1918 08/12/24  1812 08/12/24  0721 08/12/24  0312 08/11/24  1714 08/11/24  1252 08/10/24  0528 08/09/24  0049   PROCALCITONIN ng/mL  --   --   --  74.29*  --   --   --   --   --   --   --    LACTATE mmol/L 2.5* 3.5*  --   --  2.3*   < >  --   --   --   --   --    CRP mg/dL  --   --   --  1.65*  --   --   --   --   --   --   --    WBC 10*3/mm3  --   --  12.24*  --   --   --  9.97  --  10.73   < > 5.34   HEMOGLOBIN g/dL  --   --  7.9*  --   --   --  8.0* 8.6* 6.5*   < > 9.5*   HEMATOCRIT %  --   --  23.9*  --   --   --  24.9* 27.0* 20.2*   < > 31.6*   MCV fL  --   --  93.7  --   --   --  97.3*  --  97.1*   < > 102.3*   MCHC g/dL  --   --  33.1  --   --   --  32.1  --  32.2   < > 30.1*   PLATELETS 10*3/mm3  --   --  178  --   --   --  132*  --  161   < > 149   INR   --   --   --   --   --   --   --   --   --   --  0.96    < > = values in this interval not displayed.     Acid/base balance:  Results from last 7 days   Lab Units 08/11/24  1214   PH, ARTERIAL pH units 7.423   PO2 ART mm Hg 106.0   PCO2, ARTERIAL mm Hg 28.7*   HCO3 ART mmol/L 18.7*     Renal and electrolytes:    Results from last 7 days   Lab Units 08/13/24  0004 08/12/24 0312 08/11/24  1252 08/11/24  0744 08/10/24  0406   SODIUM mmol/L 135* 136 132* 135* 135*   POTASSIUM mmol/L 3.9 3.3* 3.7 3.5 3.2*   MAGNESIUM mg/dL  --  1.3*  --  1.3*  --    CHLORIDE mmol/L 99 104 100 105 103   CO2 mmol/L 15.3* 15.9* 18.4* 18.9* 19.0*   BUN mg/dL 19 18 21 18 16   CREATININE mg/dL 2.88* 2.85* 3.22* 3.12* 2.80*   CALCIUM mg/dL 6.5* 5.7* 6.0* 6.0* 6.5*     Estimated Creatinine Clearance: 12.9 mL/min (A) (by C-G formula based on SCr of 2.88 mg/dL (H)).  @GFRCG:3@   Liver and pancreatic function:  Results from last 7 days   Lab Units 08/13/24  0004 08/12/24  0312 08/11/24  1714 08/11/24  1252   ALBUMIN g/dL 3.5 1.4*  --  1.6*   BILIRUBIN mg/dL 0.7 0.3  --  0.3   ALK PHOS U/L 323* 196*  --  181*   AST (SGOT) U/L 3,281* 2,747*  --  1,377*   ALT (SGPT)  U/L 296* 322*  --  197*   AMMONIA umol/L  --   --  22  --          Cardiac:      Liver and pancreatic function:  Results from last 7 days   Lab Units 08/13/24  0004 08/12/24  0312 08/11/24  1714 08/11/24  1252   ALBUMIN g/dL 3.5 1.4*  --  1.6*   BILIRUBIN mg/dL 0.7 0.3  --  0.3   ALK PHOS U/L 323* 196*  --  181*   AST (SGOT) U/L 3,281* 2,747*  --  1,377*   ALT (SGPT) U/L 296* 322*  --  197*   AMMONIA umol/L  --   --  22  --        Medications :     aspirin, 81 mg, Oral, Daily  [Held by provider] atorvastatin, 80 mg, Oral, Nightly  ferrous sulfate, 325 mg, Oral, Daily With Breakfast  FLUoxetine, 10 mg, Oral, Daily  folic acid, 1 mg, Oral, Daily  heparin (porcine), 5,000 Units, Subcutaneous, Q12H  hydrocortisone sodium succinate, 50 mg, Intravenous, Q8H  insulin lispro, 2-7 Units, Subcutaneous, 4x Daily AC & at Bedtime  lacosamide, 50 mg, Oral, Q12H  levothyroxine, 100 mcg, Oral, QAM AC  midodrine, 7.5 mg, Oral, TID AC  mupirocin, 1 Application, Topical, Q12H  pantoprazole, 40 mg, Oral, QAM AC  piperacillin-tazobactam, 3.375 g, Intravenous, Q12H  sodium chloride, 1,000 mL, Intravenous, Once  sodium chloride, 10 mL, Intravenous, Q12H  sodium chloride, 10 mL, Intravenous, Q12H  sodium chloride, 10 mL, Intravenous, Q12H  sodium chloride, 10 mL, Intravenous, Q12H  sodium chloride, 10 mL, Intravenous, Q12H  traZODone, 25 mg, Oral, Nightly      norepinephrine, 0.02-0.3 mcg/kg/min, Last Rate: Stopped (08/13/24 0322)  Pharmacy Consult - Pharmacy to dose,   Pharmacy Consult - Pharmacy to dose,   phenylephrine, 0.5-3 mcg/kg/min, Last Rate: Stopped (08/13/24 5945)  sodium chloride 0.45 % 1,000 mL with sodium bicarbonate 8.4 % 75 mEq infusion, , Last Rate: 75 mL/hr at 08/13/24 0159            Assessment & Plan       -End-stage renal disease on peritoneal dialysis  -Hypotension  -Anemia  -Hypokalemia  -Type 2 diabetes mellitus with renal involvement  - Persistent nausea and vomiting  - Cachexia and malnutrition    Patient  continues to have hypotensive episodes, her CAT scans are negative for any source of infection.  Will get PD fluid cell count, Gram stain's and cultures to rule PD peritonitis.    Hypokalemia, better continue on oral potassium replacement    Metabolic acidosis, persistent continue on bicarb fluid    Discussed with Dr. Javier    Discussed with RN    Please avoid nephrotoxic medication and pharmacy to adjust the medication according to the GFR.      Plan of care was discussed with the patient. Patient understood, verbalized, agreed.      Nicholas Arroyo MD  08/13/24  07:45 EDT

## 2024-08-13 NOTE — CONSULTS
No in  Referring Provider:   Reason for Consultation:     Pulmonary and critical care consultation note  Pulmonary and critical care consulted for sepsis and septic shock  Chief complaint -generalized fatigue      History of present illness: Patient is a poor historian.  HPI from the time of admission reviewed.  Another family members present at bedside.  Patient is a 64-year-old female with past medical history positive for arthritis, insulin-dependent diabetes type 2, chronic diastolic heart failure, hypothyroidism, seizure, hypertension, hyperlipidemia, GERD, iron deficiency anemia, PAD and ESRD on peritoneal dialysis presented with complaints of nausea and vomiting which was going on for 1 week.  Patient was noted to have right heel ulcer and a UTI.  Treatment given in the ER then treatment given onto the floor reviewed.  All the labs medications ins and outs and vitals reviewed.  Patient underwent right above-knee amputation and blood loss.  MDR done with patient's nurse at bedside today.  Review of Systems  History obtained from chart review and the patient  General ROS: negative for - chills, fatigue or fever  Psychological ROS: negative for - anxiety or depression  ENT ROS: negative for - headaches, visual changes or vocal changes  Respiratory ROS: positive for - cough and shortness of breath  Cardiovascular ROS: no chest pain or dyspnea on exertion  Gastrointestinal ROS: no abdominal pain, change in bowel habits, or black or bloody stools  Musculoskeletal ROS: negative for - joint pain, joint stiffness or joint swelling  Neurological ROS: no TIA or stroke symptoms  Hematological: no bleeding  Skin: no bruises, no rash        History  Past Medical History:   Diagnosis Date    Arthritis     Closed fracture of right fibula and tibia 12/25/2018    Depression     Diabetic ulcer of left foot associated with type 2 diabetes mellitus 6/23/2017    Diastolic dysfunction     Disease of thyroid gland     Elevated  cholesterol     End stage renal disease     Essential hypertension     GERD (gastroesophageal reflux disease)     History of transfusion     Hyperlipidemia     Iron deficiency anemia 2018    PAD (peripheral artery disease)     Renal failure     Type 2 diabetes mellitus with hyperglycemia, with long-term current use of insulin 2018   ,   Past Surgical History:   Procedure Laterality Date    ABDOMINAL SURGERY      ABOVE KNEE AMPUTATION Right 2024    Procedure: AMPUTATION ABOVE KNEE;  Surgeon: Angelo Santoro MD;  Location: [facility] OR;  Service: General;  Laterality: Right;    BACK SURGERY      Post spinal diskectomy, osteophytectomy in one lumbar interspace    CATARACT EXTRACTION      Left      SECTION      DENTAL PROCEDURE      ENDOSCOPY      FRACTURE SURGERY      right leg    HYSTERECTOMY      INCISION AND DRAINAGE LEG Left 4/15/2017    Procedure: INCISION AND DRAINAGE LOWER EXTREMITY;  Surgeon: Basilio Morris MD;  Location: [facility] OR;  Service:     INCISION AND DRAINAGE LEG Left 2017    Procedure: Irrigation and Debridment abcess diabetic wound left foot with deep culture;  Surgeon: Basilio Morris MD;  Location: [facility] OR;  Service:     INSERTION PERITONEAL DIALYSIS CATHETER N/A 2021    Procedure: INSERTION PERITONEAL DIALYSIS CATHETER LAPAROSCOPIC;  Surgeon: Edy Glover MD;  Location: [facility] OR;  Service: General;  Laterality: N/A;    KNEE ARTHROSCOPY Right     ORIF TIBIA FRACTURE Right 2018    Procedure: TIBIAL  FRACTURE OPEN REDUCTION INTERNAL FIXATION;  Surgeon: Jung Barragan MD;  Location: [facility] OR;  Service: Orthopedics    TOE AMPUTATION Right     5th    TUBAL ABDOMINAL LIGATION     ,   Family History   Problem Relation Age of Onset    Heart disease Mother     Cancer Mother     COPD Mother     Diabetes Other     Depression Other    ,   Social History     Tobacco Use    Smoking status: Never     Passive exposure: Never    Smokeless tobacco: Never   Vaping Use     Vaping status: Never Used   Substance Use Topics    Alcohol use: No    Drug use: No   ,   Medications Prior to Admission   Medication Sig Dispense Refill Last Dose    aspirin 81 MG EC tablet Take 1 tablet by mouth Daily.   8/2/2024    cefdinir (OMNICEF) 300 MG capsule Take 1 capsule by mouth Every Other Day.   8/2/2024    ferrous sulfate 325 (65 FE) MG tablet Take 1 tablet by mouth Daily With Breakfast.   8/2/2024    FLUoxetine (PROzac) 40 MG capsule Take 1 capsule by mouth Daily.   8/2/2024    fluticasone (FLONASE) 50 MCG/ACT nasal spray 2 sprays into the nostril(s) as directed by provider Daily As Needed for Rhinitis.   Past Week    folic acid (FOLVITE) 1 MG tablet Take 1 tablet by mouth Daily.   8/2/2024    HYDROcodone-acetaminophen (NORCO)  MG per tablet Take 1 tablet by mouth 3 (Three) Times a Day As Needed for Moderate Pain.   8/2/2024    hydrOXYzine (ATARAX) 25 MG tablet Take 1 tablet by mouth 3 (Three) Times a Day As Needed for Anxiety. 15 tablet 0 8/2/2024    Insulin Aspart, w/Niacinamide, (Fiasp) 100 UNIT/ML solution Inject 2-7 Units as directed 4 (Four) Times a Day Before Meals & at Bedtime.   8/2/2024    lacosamide (VIMPAT) 50 MG tablet tablet Take 1 tablet by mouth Every 12 (Twelve) Hours.   8/2/2024    levothyroxine (SYNTHROID, LEVOTHROID) 100 MCG tablet Take 1 tablet by mouth Daily.   8/2/2024    lidocaine (LIDODERM) 5 % Place 1 patch on the skin as directed by provider Daily As Needed for Mild Pain. Remove & Discard patch within 12 hours or as directed by MD   Past Week    losartan (COZAAR) 50 MG tablet Take 1 tablet by mouth Daily.   8/2/2024    NIFEdipine XL (PROCARDIA XL) 90 MG 24 hr tablet Take 1 tablet by mouth Daily.   8/2/2024    ondansetron ODT (ZOFRAN-ODT) 4 MG disintegrating tablet Place 1 tablet on the tongue 3 (Three) Times a Day As Needed for Nausea or Vomiting.   Past Week    pantoprazole (PROTONIX) 40 MG EC tablet Take 1 tablet by mouth Daily.   8/2/2024    potassium chloride  (KLOR-CON M20) 20 MEQ CR tablet Take 1 tablet by mouth 2 (Two) Times a Day.   8/2/2024    rOPINIRole (REQUIP) 0.5 MG tablet Take 1 tablet by mouth 2 (Two) Times a Day.   8/2/2024    tiZANidine (ZANAFLEX) 4 MG tablet Take 1 tablet by mouth Every 6 (Six) Hours As Needed for Muscle Spasms.   8/2/2024    atorvastatin (LIPITOR) 80 MG tablet Take 1 tablet by mouth Every Night. 30 tablet 0 8/1/2024    traZODone (DESYREL) 50 MG tablet Take 1 tablet by mouth Every Night.   8/1/2024   , Scheduled Meds:  aspirin, 81 mg, Oral, Daily  [Held by provider] atorvastatin, 80 mg, Oral, Nightly  ferrous sulfate, 325 mg, Oral, Daily With Breakfast  FLUoxetine, 10 mg, Oral, Daily  folic acid, 1 mg, Oral, Daily  heparin (porcine), 5,000 Units, Subcutaneous, Q12H  hydrocortisone sodium succinate, 50 mg, Intravenous, Q8H  insulin lispro, 2-7 Units, Subcutaneous, 4x Daily AC & at Bedtime  lacosamide, 50 mg, Oral, Q12H  levothyroxine, 100 mcg, Oral, QAM AC  midodrine, 7.5 mg, Oral, TID AC  mupirocin, 1 Application, Topical, Q12H  pantoprazole, 40 mg, Oral, QAM AC  piperacillin-tazobactam, 3.375 g, Intravenous, Q12H  sodium chloride, 1,000 mL, Intravenous, Once  sodium chloride, 10 mL, Intravenous, Q12H  sodium chloride, 10 mL, Intravenous, Q12H  sodium chloride, 10 mL, Intravenous, Q12H  sodium chloride, 10 mL, Intravenous, Q12H  sodium chloride, 10 mL, Intravenous, Q12H  traZODone, 25 mg, Oral, Nightly  Vancomycin Pharmacy Intermittent/Pulse Dosing, , Does not apply, Daily    , Continuous Infusions:  norepinephrine, 0.02-0.3 mcg/kg/min, Last Rate: Stopped (08/13/24 0322)  Pharmacy Consult - Pharmacy to dose,   Pharmacy Consult - Pharmacy to dose,   phenylephrine, 0.5-3 mcg/kg/min, Last Rate: Stopped (08/13/24 5283)  sodium chloride 0.45 % 1,000 mL with sodium bicarbonate 8.4 % 75 mEq infusion, , Last Rate: 75 mL/hr at 08/13/24 0153     and Allergies:  Morphine, Penicillins, Codeine, and Sulfa antibiotics    Objective     Vital Signs   Temp:   [97.6 °F (36.4 °C)-98 °F (36.7 °C)] 97.8 °F (36.6 °C)  Heart Rate:  [] 94  Resp:  [10-20] 14  BP: ()/() 125/79    Physical Exam:             General-chronically ill in appearance, not in any acute distress    HEENT-PERRLA    Neck-supple    Respiratory-respirations normal-on auscultation no wheezing no crackles,    Cardiovascular-  Normal S1 and S2. No S3, S4 or murmurs. No JVD, no carotid bruit and no edema, pulses normal bilaterally     GI-nontender nondistended bowel sounds positive    CNS-nonfocal    Musculoskeletal -no edema, right above-knee amputation  Extremities- no obvious deformity noticed     Psychiatric-alert awake oriented  Skin- no visible rash                                                                   Results Review:    LABS:    Lab Results   Component Value Date    GLUCOSE 151 (H) 08/13/2024    BUN 19 08/13/2024    CREATININE 2.88 (H) 08/13/2024    EGFRIFNONA 8 (L) 02/06/2022    EGFRIFAFRI  02/06/2022      Comment:      <15 Indicative of kidney failure.    BCR 6.6 (L) 08/13/2024    CO2 15.3 (L) 08/13/2024    CALCIUM 6.5 (L) 08/13/2024    PROTENTOTREF 6.1 12/08/2020    ALBUMIN 3.5 08/13/2024    LABIL2 1.2 12/08/2020    AST 3,281 (H) 08/13/2024     (H) 08/13/2024    WBC 12.24 (H) 08/13/2024    HGB 7.9 (L) 08/13/2024    HCT 23.9 (L) 08/13/2024    MCV 93.7 08/13/2024     08/13/2024     (L) 08/13/2024    K 3.9 08/13/2024    CL 99 08/13/2024    ANIONGAP 20.7 (H) 08/13/2024       Lab Results   Component Value Date    INR 0.96 08/09/2024    INR 0.88 (L) 07/21/2024    INR 0.9 05/21/2024    PROTIME 12.8 08/09/2024    PROTIME 12.1 07/21/2024    PROTIME 13.1 04/28/2024       Results from last 7 days   Lab Units 08/09/24  0049   INR  0.96          I reviewed the patient's new clinical results.  I reviewed the patient's new imaging results and agree with the interpretation.  Microbiology Results (last 10 days)       Procedure Component Value - Date/Time    MRSA Screen,  PCR (Inpatient) - Swab, Nares [[id number]]  (Normal) Collected: 08/11/24 1557    Lab Status: Final result Specimen: Swab from Nares Updated: 08/11/24 1716     MRSA PCR No MRSA Detected    Narrative:      The negative predictive value of this diagnostic test is high and should only be used to consider de-escalating anti-MRSA therapy. A positive result may indicate colonization with MRSA and must be correlated clinically.    Wound Culture - Wound, Foot, Right [[id number]]  (Abnormal)  (Susceptibility) Collected: 08/03/24 1420    Lab Status: Final result Specimen: Wound from Foot, Right Updated: 08/08/24 0728     Wound Culture Moderate growth (3+) Mixed Gram Negative Vannessa      Moderate growth (3+) Enterococcus faecalis     Gram Stain No WBCs seen      Few (2+) Gram positive cocci in pairs      Rare (1+) Gram negative bacilli    Susceptibility        Enterococcus faecalis      DAVID      Ampicillin Susceptible      Vancomycin Susceptible                                   Latest Reference Range & Units 08/11/24 12:14   pH, Arterial 7.350 - 7.450 pH units 7.423   pCO2, Arterial 35.0 - 45.0 mm Hg 28.7 (L)   pO2, Arterial 83.0 - 108.0 mm Hg 106.0   HCO3, Arterial 20.0 - 26.0 mmol/L 18.7 (L)   Base Excess 0.0 - 2.0 mmol/L -5.1 (L)   O2 Saturation, Arterial 94.0 - 99.0 % 99.0   CO2 Content 22 - 33 mmol/L 19.6 (L)   A-a DO2 0.0 - 300.0 mmHg 4.7   Carboxyhemoglobin 0 - 5 % 1.3   Methemoglobin 0.00 - 3.00 % 0.70   Oxyhemoglobin 94 - 99 % 97.0   Hematocrit, Blood Gas 38.0 - 51.0 % 20.8 (L)   Hemoglobin, Blood Gas 13.5 - 17.5 g/dL 6.8 (C)   Site  Right Radial   Óscar's Test  Positive   Modality  Room Air   FIO2 % 21   Ventilator Mode  NA   Barometric Pressure for Blood Gas mmHg 729   Notified By  [id number]   Notified Time  08/11/2024 12:18   Notified Who  RN AVA   (C): Data is critically low  (L): Data is abnormally low  Assessment & Plan     Neurology-alert awake oriented able to protect her airway.    Respiratory-latest chest  x-ray reviewed.  FiO2 requirement reviewed and adjusted to maintain saturation 88 to 92%.  Will get VBG in the morning  Cardiology-   Septic shock-likely due to the foot ulcer and then amputation.  Was on Levophed.  Titrated off.  Continue midodrine to maintain a MAP of 65 and above.  Dose reviewed and adjusted.  hemodynamically -stable now.  Continue stress dose steroids.  Will start weaning from tomorrow.  Continue to monitor HR- rate and rhythm, BP   Will get lactic acid level in the morning      Lab 08/13/24  0616 08/13/24  0005 08/12/24  1812 08/12/24  1312 08/12/24  1036   LACTATE 2.5* 3.5* 2.3* 2.3* 3.1*      Nephrology- Cr and BUN stable  I/O-reviewed.  Creatinine elevated.  Nephrology on case.  Continue management as per them.  Continue fluids as per nephrology.  Replacement fluids reviewed.  Ionized calcium low.  Recommend repeating.  GI- PPI prophylaxis  Continue current diet.  .  Transaminitis-likely due to shock liver.      Narrative & Impression   PROCEDURE: Abdominal ultrasound evaluation performed on August 11, 2024.  Color flow imaging performed.     HISTORY: Elevated liver function tests.     COMPARISON: None.     FINDINGS:     Echogenic liver suggestive of fatty infiltration.  Small volume of free fluid adjacent to the liver consistent with  ascites.  Patent hepatic veins with appropriate direction of flow.  Patent main portal vein with appropriate direction of flow.  No hepatic mass  No dilated intrahepatic bile ducts  Sludge noted in the gallbladder lumen.  No gallbladder wall thickening or para cholecystic fluid.  No right renal hydronephrosis  No features of cholecystitis.     IMPRESSION:     1.  Echogenic liver suggestive of fatty infiltration.  2.  Patent main portal vein with appropriate direction of flow.  3.  Patent hepatic veins with appropriate direction of flow.  4.  Sludge noted in the gallbladder lumen.  5.  No features of cholecystitis.  6.  Small volume of free fluid in the right  upper quadrant consistent  with ascites.  7.  No hepatic mass or cyst  8.  No dilated intrahepatic bile ducts.        If LFTs does not improve will order HIDA scan.     Hematology- CBC  Hb-transfuse for hemoglobin less than 7  platelet  WBC    ID sepsis and septic shock-likely due to the ulcer.  Status post amputation.    Culture-reviewed  And Antibiotics-continue.  Cultures reviewed    Endrocrinology- Maintain Blood sugar 140 -180  Will get TSH and free T4 level    Electrolytes-   Mag and phos       DVT prophylaxis-    Bedside rounds were done with RT and patient's nurse. All the lab and clinical findings were discussed with them and plan was also discussed in great detail.    Family member present-none        Echo-    Results for orders placed during the hospital encounter of 08/19/23    Adult Transthoracic Echo Complete W/ Cont if Necessary Per Protocol    Interpretation Summary    Left ventricular systolic function is normal. Left ventricular ejection fraction appears to be 56 - 60%.    Left ventricular diastolic function is consistent with (grade I) impaired relaxation.    Left atrial volume is mildly increased.    Moderate mitral valve stenosis is present.    Estimated right ventricular systolic pressure from tricuspid regurgitation is mildly elevated (35-45 mmHg).    Mild pulmonary hypertension is present.    There is no evidence of pericardial effusion.  Patient is currently critically ill due to septic shock currently on vasopressors, shock liver, HORACE and infection.  I adjusted patient's medications.cc 31 mins  Case discussed with patient's nurse and primary team.          Diabetic ulcer of right heel          Johnson Rider MD  08/13/24  12:58 EDT

## 2024-08-13 NOTE — PROGRESS NOTES
PROGRESS NOTE         Patient Identification:  Name:  Reny Elena  Age:  64 y.o.  Sex:  female  :  1960  MRN:  [id number]  Visit Number:  [id number]  Primary Care Provider:  Susan Stephens APRN         LOS: 10 days       ----------------------------------------------------------------------------------------------------------------------  Subjective       Chief Complaints:    Vomiting        Interval History:      The patient was transferred to CCU this morning due to hypotension and sepsis.  The patient is drowsy and confused.  On room air with no apparent distress.  Afebrile.  No reports of diarrhea.  Lung exam is diminished to auscultation bilaterally.  Abdomen is soft and nontender with normoactive bowel sounds.  The right AKA site was examined, open to air with staples in place, no erythema/edema or drainage.  Leukocytosis at 12.24.  Platelet count 178.  Peritoneal body fluid culture and fungal culture pending.  Peritoneal fluid analysis thus far reporting 5 nucleated cells.      Review of Systems:    EMMANUELLE due to confusion  ----------------------------------------------------------------------------------------------------------------------      Objective       Current Hospital Meds:  aspirin, 81 mg, Oral, Daily  [Held by provider] atorvastatin, 80 mg, Oral, Nightly  ferrous sulfate, 325 mg, Oral, Daily With Breakfast  FLUoxetine, 10 mg, Oral, Daily  folic acid, 1 mg, Oral, Daily  heparin (porcine), 5,000 Units, Subcutaneous, Q12H  hydrocortisone sodium succinate, 50 mg, Intravenous, Q6H  insulin lispro, 2-7 Units, Subcutaneous, 4x Daily AC & at Bedtime  lacosamide, 50 mg, Oral, Q12H  levothyroxine, 100 mcg, Oral, QAM AC  midodrine, 7.5 mg, Oral, TID AC  mupirocin, 1 Application, Topical, Q12H  pantoprazole, 40 mg, Oral, QAM AC  piperacillin-tazobactam, 3.375 g, Intravenous, Q12H  sodium chloride, 1,000 mL, Intravenous, Once  sodium chloride, 10 mL, Intravenous, Q12H  sodium chloride,  10 mL, Intravenous, Q12H  sodium chloride, 10 mL, Intravenous, Q12H  sodium chloride, 10 mL, Intravenous, Q12H  sodium chloride, 10 mL, Intravenous, Q12H  traZODone, 25 mg, Oral, Nightly  Vancomycin Pharmacy Intermittent/Pulse Dosing, , Does not apply, Daily      norepinephrine, 0.02-0.3 mcg/kg/min, Last Rate: Stopped (08/13/24 0322)  Pharmacy Consult - Pharmacy to dose,   Pharmacy Consult - Pharmacy to dose,   phenylephrine, 0.5-3 mcg/kg/min, Last Rate: Stopped (08/13/24 0519)  sodium chloride 0.45 % 1,000 mL with sodium bicarbonate 8.4 % 75 mEq infusion, , Last Rate: 75 mL/hr at 08/13/24 0153        ----------------------------------------------------------------------------------------------------------------------    Vital Signs:  Temp:  [97.6 °F (36.4 °C)-98 °F (36.7 °C)] 97.8 °F (36.6 °C)  Heart Rate:  [] 94  Resp:  [10-20] 14  BP: ()/() 125/79  Mean Arterial Pressure (Non-Invasive) for the past 24 hrs (Last 3 readings):   Noninvasive MAP (mmHg)   08/13/24 1224 101   08/13/24 1221 111   08/13/24 1215 41       SpO2 Percentage    08/13/24 1220 08/13/24 1221 08/13/24 1224   SpO2: 97% 97% 96%     SpO2:  [96 %-100 %] 96 %  on   ;   Device (Oxygen Therapy): room air    Body mass index is 16.09 kg/m².  Wt Readings from Last 3 Encounters:   08/13/24 42.5 kg (93 lb 12.8 oz)   07/26/24 45.4 kg (100 lb)   07/26/24 51.7 kg (114 lb)        Intake/Output Summary (Last 24 hours) at 8/13/2024 1325  Last data filed at 8/13/2024 1211  Gross per 24 hour   Intake 2606.27 ml   Output --   Net 2606.27 ml     Diet: Diabetic; Consistent Carbohydrate; Feeding Assistance - Nursing; Texture: Soft to Chew (NDD 3); Soft to Chew: Chopped Meat; Fluid Consistency: Thin (IDDSI 0)  ----------------------------------------------------------------------------------------------------------------------      Physical Exam:    Constitutional: Frail elderly female resting quietly in bed.  Drowsy and confused.  Primary RN at the  bedside.  HENT:  Head: Normocephalic and atraumatic.  Mouth:  Moist mucous membranes.    Eyes:  Conjunctivae and EOM are normal.  No scleral icterus.  Neck:  Neck supple.  No JVD present.    Cardiovascular:  Normal rate, regular rhythm and normal heart sounds with no murmur. No edema.  Pulmonary/Chest: Diminished bilaterally to auscultation with diminished bases no respiratory distress, no wheezes, no crackles, with normal breath sounds and good air movement.  Abdominal:  peritoneal dialysis catheter. Soft.  Bowel sounds are normal.  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  Neurological:  Alert and oriented to person, place, and time.  No facial droop.  No slurred speech.    Skin: Right AKA, open to air with staples in place, no erythema/edema and no drainage skin is warm and dry.  No rash noted.  No pallor.         ----------------------------------------------------------------------------------------------------------------------  Results from last 7 days   Lab Units 08/11/24  0744 08/06/24  2031 08/06/24  1817   CK TOTAL U/L 90  --   --    HSTROP T ng/L  --  121* 124*         Results from last 7 days   Lab Units 08/11/24  1214   PH, ARTERIAL pH units 7.423   PO2 ART mm Hg 106.0   PCO2, ARTERIAL mm Hg 28.7*   HCO3 ART mmol/L 18.7*     Results from last 7 days   Lab Units 08/13/24  0616 08/13/24  0005 08/13/24  0004 08/12/24  1918 08/12/24  1812 08/12/24  0721 08/12/24  0312 08/11/24  1714 08/11/24  1252 08/10/24  0528 08/09/24  0049   CRP mg/dL  --   --   --  1.65*  --   --   --   --   --   --   --    LACTATE mmol/L 2.5* 3.5*  --   --  2.3*   < >  --   --   --   --   --    WBC 10*3/mm3  --   --  12.24*  --   --   --  9.97  --  10.73   < > 5.34   HEMOGLOBIN g/dL  --   --  7.9*  --   --   --  8.0* 8.6* 6.5*   < > 9.5*   HEMATOCRIT %  --   --  23.9*  --   --   --  24.9* 27.0* 20.2*   < > 31.6*   MCV fL  --   --  93.7  --   --   --  97.3*  --  97.1*  "  < > 102.3*   MCHC g/dL  --   --  33.1  --   --   --  32.1  --  32.2   < > 30.1*   PLATELETS 10*3/mm3  --   --  178  --   --   --  132*  --  161   < > 149   INR   --   --   --   --   --   --   --   --   --   --  0.96    < > = values in this interval not displayed.     Results from last 7 days   Lab Units 08/13/24  0004 08/12/24  0312 08/11/24  1252 08/11/24  0744   SODIUM mmol/L 135* 136 132* 135*   POTASSIUM mmol/L 3.9 3.3* 3.7 3.5   MAGNESIUM mg/dL  --  1.3*  --  1.3*   CHLORIDE mmol/L 99 104 100 105   CO2 mmol/L 15.3* 15.9* 18.4* 18.9*   BUN mg/dL 19 18 21 18   CREATININE mg/dL 2.88* 2.85* 3.22* 3.12*   CALCIUM mg/dL 6.5* 5.7* 6.0* 6.0*   GLUCOSE mg/dL 151* 229* 146* 62*   ALBUMIN g/dL 3.5 1.4* 1.6* 1.6*   BILIRUBIN mg/dL 0.7 0.3 0.3 0.3   ALK PHOS U/L 323* 196* 181* 182*   AST (SGOT) U/L 3,281* 2,747* 1,377* 965*   ALT (SGPT) U/L 296* 322* 197* 147*   Estimated Creatinine Clearance: 12.9 mL/min (A) (by C-G formula based on SCr of 2.88 mg/dL (H)).  Ammonia   Date Value Ref Range Status   08/11/2024 22 11 - 51 umol/L Final       Glucose   Date/Time Value Ref Range Status   08/13/2024 1128 157 (H) 70 - 130 mg/dL Final   08/13/2024 0647 206 (H) 70 - 130 mg/dL Final   08/12/2024 2356 146 (H) 70 - 130 mg/dL Final   08/12/2024 2052 148 (H) 70 - 130 mg/dL Final   08/12/2024 1746 185 (H) 70 - 130 mg/dL Final   08/12/2024 1738 59 (L) 70 - 130 mg/dL Final   08/12/2024 1726 52 (L) 70 - 130 mg/dL Final   08/12/2024 1210 72 70 - 130 mg/dL Final     Lab Results   Component Value Date    HGBA1C 6.10 (H) 08/02/2024     Lab Results   Component Value Date    TSH 17.140 (H) 07/21/2024    FREET4 2.1 (H) 05/17/2024       Blood Culture   Date Value Ref Range Status   08/02/2024 No growth at 24 hours  Preliminary   08/02/2024 No growth at 24 hours  Preliminary     No results found for: \"URINECX\"  No results found for: \"WOUNDCX\"  No results found for: \"STOOLCX\"  No results found for: \"RESPCX\"  Pain Management Panel  More data exists "         Latest Ref Rng & Units 7/21/2024 4/28/2024   Pain Management Panel   Amphetamine, Urine Qual Negative Negative  Negative    Barbiturates Screen, Urine Negative Negative  Negative    Benzodiazepine Screen, Urine Negative Negative  Negative    Buprenorphine, Screen, Urine Negative Negative  Negative    Cocaine Screen, Urine Negative Negative  Negative    Fentanyl, Urine Negative Negative  Negative    Methadone Screen , Urine Negative Negative  Negative    Methamphetamine, Ur Negative Negative  Negative       Details                     ----------------------------------------------------------------------------------------------------------------------  Imaging Results (Last 24 Hours)       Procedure Component Value Units Date/Time    XR Chest 1 View [[id number]] Collected: 08/12/24 1630     Updated: 08/12/24 1632    Narrative:      EXAM:    XR Chest, 1 View     EXAM DATE:    8/12/2024 4:26 PM     CLINICAL HISTORY:    CVC Placement Verification; E11.621-Type 2 diabetes mellitus with foot  ulcer; L97.416-Non-pressure chronic ulcer of right heel and midfoot with  bone involvement without evidence of necrosis; Z99.2-Dependence on renal  dialysis; R11.2-Nausea with vomiting, unspecified; N30.01-Acute cystitis  with hematuria; L97.519-Non-pressure chronic ulcer of other part of  right foot with unspecified severity     TECHNIQUE:    Frontal view of the chest.     COMPARISON:    8/11/2024     FINDINGS:    LUNGS AND PLEURAL SPACES:  Coarsened interstitial markings noted  throughout the lungs.  No consolidation.  No pneumothorax.    HEART:  Unremarkable as visualized.  No cardiomegaly.    MEDIASTINUM:  Unremarkable as visualized.  Normal mediastinal contour.    BONES/JOINTS:  Unremarkable as visualized.  No acute fracture.    TUBES, LINES AND DEVICES:  Left subclavian central venous catheter tip  in the superior vena cava.       Impression:        No acute cardiopulmonary findings identified.        This report was  finalized on 8/12/2024 4:30 PM by Dr. Harrison Biswas MD.               ----------------------------------------------------------------------------------------------------------------------    Pertinent Infectious Disease Results    Thank you Dr. Crawford for allowing us to participate in the care of your patient.  As you well know, Ms. Reny Elena is a 64 y.o. female with past medical history significant for seizure disorder, type 2 diabetes, arthritis, CHF, hypothyroidism, hypertension, hyperlipidemia, GERD, PAD, ESRD, iron deficiency anemia,, who presented to [facility] Emergency Department on 8/2/2024 for nausea, vomiting, diarrhea and worsening pain of right diabetic foot wound.  Patient was recently seen in our ED on 7/21/2024 with complaints of hallucinations, fever, and right foot pain.  At this time, patient was diagnosed with right heel ulcer and UTI.  She was sent home with an antibiotic but was unsure of the name.  Patient reported she started having nausea and vomiting after taking the antibiotics so she stopped taking them.  However, she continued to have nausea and vomiting.       Patient was tachycardic in the ED.Labs show K+ at upper limit of normal, BUN 30, cr 4.36, glucose 176, alk phos 323 and albumin 2.9, otherwise normal LFTs. CRP mildly elevated at 2.34, while lactate and WBC are normal. UA shows large leuk esterase and unable to determine RBC, WBC, bacteria of squamous cells due to loaded field.  Patient's urine culture from 7/21/2024 grew E. coli.  She had also been following with wound care who it obtained a wound culture on the same day.  This culture finalized with mixed gram-negative maurisio and light growth of Enterococcus faecalis.       X-ray of the right foot performed on 8/2/2024 revealed partial amputation of the fifth metatarsal. Bony demineralization. No acute fracture or dislocation. No lytic or blastic bony lesions seen. Diffuse interphalangeal joint space loss. Mild  degenerative changes in the midfoot. No cortical erosion. Fixation timur in the tibia/talus/calcaneus noted. Diffuse soft tissue swelling. Vascular calcifications. No soft tissue gas.  CT abdomen/pelvis revealed peritoneal dialysis catheter noted in the anterior right abdominal wall with the catheter noted to terminate in the left lateral pelvis. Small volume of free fluid in the right upper quadrant and lower posterior pelvis and right lower quadrant. Slightly elevated left hemidiaphragm. No bowel or renal obstruction. No abdominal aortic aneurysm. No features of acute appendicitis. Stranding identified in the presacral space could present changes related to mild distal colitis. Fluid-filled bladder with small volume of air in the bladder lumen. No pneumatosis. No conclusive features of free air. Distended configuration to the gallbladder likely due to fasting. No conclusive features of gastritis or enteritis.       Patient continued to have nausea and vomiting after 2 doses of Zofran.  She was admitted for further treatment.  Subsequently, patient was started on IV Rocephin in the setting of E. coli UTI.  Flagyl was added to cover possible colitis.  Patient was also started on IV vancomycin for Enterococcus faecalis wound infection.  General surgery consulted for possible debridement of patient's wound.     Assessment/Plan       Assessment     R foot wound infection, Enterococcus faecalis  Possible osteomyelitis of R foot  Hardware present in RLE  E. Coli UTI  Possible colitis         Plan      The patient was transferred to CCU this morning due to hypotension and sepsis.  The patient is drowsy and confused.  On room air with no apparent distress.  Afebrile.  No reports of diarrhea.  Lung exam is diminished to auscultation bilaterally.  Abdomen is soft and nontender with normoactive bowel sounds.  The right AKA site was examined, open to air with staples in place, no erythema/edema or drainage.  Leukocytosis at  12.24.  Platelet count 178.  Peritoneal body fluid culture and fungal culture pending.  Peritoneal fluid analysis thus far reporting 5 nucleated cells.    Antibiotic courses were changed per the primary team due to elevated LFTs.  The patient is currently receiving Zosyn 3.375 g IV every 12 hours and vancomycin pharmacy dosing in the setting of new onset sepsis.  We will continue to monitor closely and adjust antibiotic coverage as appropriate.    ANTIMICROBIAL THERAPY    cefdinir - 300 MG  piperacillin-tazobactam (ZOSYN) IVPB 3.375 g IVPB in 100 mL NS (VTB)  Vancomycin Pharmacy Intermittent/Pulse Dosing     Code Status:   Code Status and Medical Interventions: No CPR (Do Not Attempt to Resuscitate); Limited Support; No intubation (DNI)   Ordered at: 08/04/24 1645     Medical Intervention Limits:    No intubation (DNI)     Code Status (Patient has no pulse and is not breathing):    No CPR (Do Not Attempt to Resuscitate)     Medical Interventions (Patient has pulse or is breathing):    Limited Support       Yvette Ventura, DARIUS  08/13/24  13:25 EDT

## 2024-08-13 NOTE — PLAN OF CARE
Problem: Pain Acute  Goal: Acceptable Pain Control and Functional Ability  Outcome: Ongoing, Not Progressing     Problem: Impaired Wound Healing  Goal: Optimal Wound Healing  Outcome: Ongoing, Not Progressing     Problem: Adult Inpatient Plan of Care  Goal: Plan of Care Review  Outcome: Ongoing, Not Progressing  Goal: Patient-Specific Goal (Individualized)  Outcome: Ongoing, Not Progressing  Goal: Absence of Hospital-Acquired Illness or Injury  Outcome: Ongoing, Not Progressing  Intervention: Identify and Manage Fall Risk  Recent Flowsheet Documentation  Taken 8/13/2024 0300 by Rachel Haddad RN  Safety Promotion/Fall Prevention: safety round/check completed  Taken 8/13/2024 0100 by Rachel Haddad RN  Safety Promotion/Fall Prevention: safety round/check completed  Taken 8/12/2024 2300 by Rachel Haddad RN  Safety Promotion/Fall Prevention: safety round/check completed  Taken 8/12/2024 2100 by Rachel Haddad RN  Safety Promotion/Fall Prevention: safety round/check completed  Taken 8/12/2024 1900 by Rosenbalm, Rachel, RN  Safety Promotion/Fall Prevention: safety round/check completed  Intervention: Prevent and Manage VTE (Venous Thromboembolism) Risk  Recent Flowsheet Documentation  Taken 8/13/2024 0200 by Rachel Haddad RN  VTE Prevention/Management: (see MAR) other (see comments)  Taken 8/12/2024 2000 by Rachel Haddad RN  VTE Prevention/Management: (hep) other (see comments)  Goal: Optimal Comfort and Wellbeing  Outcome: Ongoing, Not Progressing  Intervention: Provide Person-Centered Care  Recent Flowsheet Documentation  Taken 8/13/2024 0200 by Rachel Haddad RN  Trust Relationship/Rapport: care explained  Taken 8/12/2024 2000 by Rachel Haddad RN  Trust Relationship/Rapport: care explained  Goal: Readiness for Transition of Care  Outcome: Ongoing, Not Progressing     Problem: Fall Injury Risk  Goal: Absence of Fall and Fall-Related Injury  Outcome: Ongoing, Not  Progressing  Intervention: Promote Injury-Free Environment  Recent Flowsheet Documentation  Taken 8/13/2024 0300 by Rachel Haddad RN  Safety Promotion/Fall Prevention: safety round/check completed  Taken 8/13/2024 0100 by Rachel Haddad RN  Safety Promotion/Fall Prevention: safety round/check completed  Taken 8/12/2024 2300 by Rachel Haddad RN  Safety Promotion/Fall Prevention: safety round/check completed  Taken 8/12/2024 2100 by Rachel Haddad RN  Safety Promotion/Fall Prevention: safety round/check completed  Taken 8/12/2024 1900 by Rosenbalm, Rachel, RN  Safety Promotion/Fall Prevention: safety round/check completed     Problem: Skin Injury Risk Increased  Goal: Skin Health and Integrity  Outcome: Ongoing, Not Progressing     Problem: Asthma Comorbidity  Goal: Maintenance of Asthma Control  Outcome: Ongoing, Not Progressing     Problem: Behavioral Health Comorbidity  Goal: Maintenance of Behavioral Health Symptom Control  Outcome: Ongoing, Not Progressing     Problem: COPD (Chronic Obstructive Pulmonary Disease) Comorbidity  Goal: Maintenance of COPD Symptom Control  Outcome: Ongoing, Not Progressing     Problem: Diabetes Comorbidity  Goal: Blood Glucose Level Within Targeted Range  Outcome: Ongoing, Not Progressing     Problem: Heart Failure Comorbidity  Goal: Maintenance of Heart Failure Symptom Control  Outcome: Ongoing, Not Progressing     Problem: Hypertension Comorbidity  Goal: Blood Pressure in Desired Range  Outcome: Ongoing, Not Progressing     Problem: Obstructive Sleep Apnea Risk or Actual Comorbidity Management  Goal: Unobstructed Breathing During Sleep  Outcome: Ongoing, Not Progressing     Problem: Osteoarthritis Comorbidity  Goal: Maintenance of Osteoarthritis Symptom Control  Outcome: Ongoing, Not Progressing     Problem: Pain Chronic (Persistent) (Comorbidity Management)  Goal: Acceptable Pain Control and Functional Ability  Outcome: Ongoing, Not Progressing      Problem: Seizure Disorder Comorbidity  Goal: Maintenance of Seizure Control  Outcome: Ongoing, Not Progressing     Problem: Adjustment to Illness (Sepsis/Septic Shock)  Goal: Optimal Coping  Outcome: Ongoing, Not Progressing     Problem: Bleeding (Sepsis/Septic Shock)  Goal: Absence of Bleeding  Outcome: Ongoing, Not Progressing     Problem: Glycemic Control Impaired (Sepsis/Septic Shock)  Goal: Blood Glucose Level Within Desired Range  Outcome: Ongoing, Not Progressing     Problem: Infection Progression (Sepsis/Septic Shock)  Goal: Absence of Infection Signs and Symptoms  Outcome: Ongoing, Not Progressing     Problem: Nutrition Impaired (Sepsis/Septic Shock)  Goal: Optimal Nutrition Intake  Outcome: Ongoing, Not Progressing   Goal Outcome Evaluation:

## 2024-08-13 NOTE — PLAN OF CARE
Problem: Pain Acute  Goal: Acceptable Pain Control and Functional Ability  Outcome: Ongoing, Progressing  Intervention: Prevent or Manage Pain  Recent Flowsheet Documentation  Taken 8/13/2024 1500 by Roly Chinchilla RN  Medication Review/Management: medications reviewed  Taken 8/13/2024 1400 by Roly Chinchilla RN  Medication Review/Management: medications reviewed  Taken 8/13/2024 1300 by Roly Chinchilla RN  Medication Review/Management: medications reviewed  Taken 8/13/2024 1200 by Roly Chinchilla RN  Medication Review/Management: medications reviewed  Taken 8/13/2024 1100 by Roly Chinchilla RN  Medication Review/Management: medications reviewed  Taken 8/13/2024 1000 by Roly Chinchilla RN  Medication Review/Management: medications reviewed  Taken 8/13/2024 0800 by Roly Chinchilal RN  Sleep/Rest Enhancement: consistent schedule promoted  Medication Review/Management: medications reviewed  Intervention: Develop Pain Management Plan  Recent Flowsheet Documentation  Taken 8/13/2024 1400 by Roly Chinchilla RN  Pain Management Interventions: see MAR  Taken 8/13/2024 0800 by Roly Chinchilla RN  Pain Management Interventions: see MAR  Intervention: Optimize Psychosocial Wellbeing  Recent Flowsheet Documentation  Taken 8/13/2024 1400 by Roly Chinchilla RN  Diversional Activities: television  Taken 8/13/2024 0800 by Roly Chinchilla RN  Diversional Activities: television     Problem: Impaired Wound Healing  Goal: Optimal Wound Healing  Outcome: Ongoing, Progressing  Intervention: Promote Wound Healing  Recent Flowsheet Documentation  Taken 8/13/2024 1400 by Roly Chinchilla RN  Activity Management: activity encouraged  Pain Management Interventions: see MAR  Taken 8/13/2024 0800 by Roly Chinchilla RN  Activity Management: activity encouraged  Pain Management Interventions: see MAR  Sleep/Rest Enhancement: consistent schedule promoted     Problem: Adult Inpatient Plan of Care  Goal: Plan of Care Review  Outcome: Ongoing,  Progressing  Flowsheets  Taken 8/13/2024 1523 by Roly Chinchilla RN  Progress: no change  Outcome Evaluation: VSS, RA, AxO but slow to respond, Bicarb continues to infuse, no acute events noted, wound care consulted, changed per orders, call light within reach, alarms active and audible, no needs noted at this time  Taken 8/12/2024 1508 by Gege Benson RN  Plan of Care Reviewed With: patient  Goal: Patient-Specific Goal (Individualized)  Outcome: Ongoing, Progressing  Goal: Absence of Hospital-Acquired Illness or Injury  Outcome: Ongoing, Progressing  Intervention: Identify and Manage Fall Risk  Recent Flowsheet Documentation  Taken 8/13/2024 1500 by Roly Chinchilla RN  Safety Promotion/Fall Prevention:   activity supervised   fall prevention program maintained   safety round/check completed  Taken 8/13/2024 1400 by Roly Chinchilla RN  Safety Promotion/Fall Prevention:   activity supervised   fall prevention program maintained   safety round/check completed  Taken 8/13/2024 1300 by Roly Chinchilla RN  Safety Promotion/Fall Prevention:   activity supervised   fall prevention program maintained   safety round/check completed  Taken 8/13/2024 1200 by Roly Chinchilla RN  Safety Promotion/Fall Prevention:   activity supervised   fall prevention program maintained   safety round/check completed  Taken 8/13/2024 1100 by Roly Chinchilla RN  Safety Promotion/Fall Prevention:   activity supervised   fall prevention program maintained   safety round/check completed  Taken 8/13/2024 1000 by Roly Chinchilla RN  Safety Promotion/Fall Prevention:   activity supervised   fall prevention program maintained   safety round/check completed  Intervention: Prevent Skin Injury  Recent Flowsheet Documentation  Taken 8/13/2024 1400 by Roly Chinchilla RN  Body Position:   turned   right  Skin Protection:   adhesive use limited   incontinence pads utilized   skin-to-device areas padded   transparent dressing maintained   tubing/devices  free from skin contact  Taken 8/13/2024 1200 by Roly Chinchilla RN  Body Position:   turned   left  Taken 8/13/2024 1000 by Roly Chinchilla RN  Body Position:   turned   right  Taken 8/13/2024 0800 by Roly Chinchilla RN  Body Position:   turned   left  Skin Protection:   adhesive use limited   incontinence pads utilized   skin-to-device areas padded   transparent dressing maintained   tubing/devices free from skin contact  Intervention: Prevent and Manage VTE (Venous Thromboembolism) Risk  Recent Flowsheet Documentation  Taken 8/13/2024 1400 by Roly Chinchilla RN  Activity Management: activity encouraged  VTE Prevention/Management: (See MAR) other (see comments)  Taken 8/13/2024 0800 by Roly Chinchilla RN  Activity Management: activity encouraged  VTE Prevention/Management: (See MAR) other (see comments)  Range of Motion: active ROM (range of motion) encouraged  Intervention: Prevent Infection  Recent Flowsheet Documentation  Taken 8/13/2024 1400 by Roly Chinchilla RN  Infection Prevention:   single patient room provided   rest/sleep promoted  Taken 8/13/2024 0800 by Roly Chinchilla RN  Infection Prevention:   rest/sleep promoted   single patient room provided  Goal: Optimal Comfort and Wellbeing  Outcome: Ongoing, Progressing  Intervention: Monitor Pain and Promote Comfort  Recent Flowsheet Documentation  Taken 8/13/2024 1400 by Roly Chinchilla RN  Pain Management Interventions: see MAR  Taken 8/13/2024 0800 by Roly Chinchilla RN  Pain Management Interventions: see MAR  Intervention: Provide Person-Centered Care  Recent Flowsheet Documentation  Taken 8/13/2024 1400 by Roly Chinchilla RN  Trust Relationship/Rapport:   care explained   emotional support provided   reassurance provided   thoughts/feelings acknowledged  Taken 8/13/2024 0800 by Roly Chinchilla RN  Trust Relationship/Rapport:   care explained   emotional support provided   questions answered   reassurance provided   thoughts/feelings acknowledged  Goal:  Readiness for Transition of Care  Outcome: Ongoing, Progressing     Problem: Fall Injury Risk  Goal: Absence of Fall and Fall-Related Injury  Outcome: Ongoing, Progressing  Intervention: Identify and Manage Contributors  Recent Flowsheet Documentation  Taken 8/13/2024 1500 by Roly Chinchilla RN  Medication Review/Management: medications reviewed  Taken 8/13/2024 1400 by Roly Chinchilla RN  Medication Review/Management: medications reviewed  Taken 8/13/2024 1300 by Roly Chinchilla RN  Medication Review/Management: medications reviewed  Taken 8/13/2024 1200 by Roly Chinchilla RN  Medication Review/Management: medications reviewed  Taken 8/13/2024 1100 by Roly Chinchilla RN  Medication Review/Management: medications reviewed  Taken 8/13/2024 1000 by Roly Chinchilla RN  Medication Review/Management: medications reviewed  Taken 8/13/2024 0800 by Roly Chinchilla RN  Medication Review/Management: medications reviewed  Intervention: Promote Injury-Free Environment  Recent Flowsheet Documentation  Taken 8/13/2024 1500 by Roly Chinchilla RN  Safety Promotion/Fall Prevention:   activity supervised   fall prevention program maintained   safety round/check completed  Taken 8/13/2024 1400 by Roly Chinchilla RN  Safety Promotion/Fall Prevention:   activity supervised   fall prevention program maintained   safety round/check completed  Taken 8/13/2024 1300 by Roly Chinchilla RN  Safety Promotion/Fall Prevention:   activity supervised   fall prevention program maintained   safety round/check completed  Taken 8/13/2024 1200 by Roly Chinchilla RN  Safety Promotion/Fall Prevention:   activity supervised   fall prevention program maintained   safety round/check completed  Taken 8/13/2024 1100 by Roly Chinchilla RN  Safety Promotion/Fall Prevention:   activity supervised   fall prevention program maintained   safety round/check completed  Taken 8/13/2024 1000 by Roly Chinchilla RN  Safety Promotion/Fall Prevention:   activity  supervised   fall prevention program maintained   safety round/check completed     Problem: Skin Injury Risk Increased  Goal: Skin Health and Integrity  Outcome: Ongoing, Progressing  Intervention: Optimize Skin Protection  Recent Flowsheet Documentation  Taken 8/13/2024 1400 by Roly Chinchilla RN  Pressure Reduction Techniques:   heels elevated off bed   pressure points protected   positioned off wounds   weight shift assistance provided  Head of Bed (HOB) Positioning: HOB elevated  Pressure Reduction Devices:   alternating pressure pump (ADD)   foam padding utilized   heel offloading device utilized   positioning supports utilized   specialty bed utilized  Skin Protection:   adhesive use limited   incontinence pads utilized   skin-to-device areas padded   transparent dressing maintained   tubing/devices free from skin contact  Taken 8/13/2024 1200 by Roly Chinchilla RN  Head of Bed ([facility]) Positioning: HOB elevated  Taken 8/13/2024 1000 by Roly Chinchilla RN  Head of Bed ([facility]) Positioning: HOB elevated  Taken 8/13/2024 0800 by Roly Chinchilla RN  Pressure Reduction Techniques:   heels elevated off bed   positioned off wounds   pressure points protected   weight shift assistance provided  Head of Bed (HOB) Positioning: HOB elevated  Pressure Reduction Devices:   alternating pressure pump (ADD)   heel offloading device utilized   positioning supports utilized   specialty bed utilized  Skin Protection:   adhesive use limited   incontinence pads utilized   skin-to-device areas padded   transparent dressing maintained   tubing/devices free from skin contact     Problem: Asthma Comorbidity  Goal: Maintenance of Asthma Control  Outcome: Ongoing, Progressing  Intervention: Maintain Asthma Symptom Control  Recent Flowsheet Documentation  Taken 8/13/2024 1500 by Roly Chinchilla RN  Medication Review/Management: medications reviewed  Taken 8/13/2024 1400 by Roly Chinchilla RN  Medication Review/Management: medications  reviewed  Taken 8/13/2024 1300 by Roly Chinchilla RN  Medication Review/Management: medications reviewed  Taken 8/13/2024 1200 by Roly Chinchilla RN  Medication Review/Management: medications reviewed  Taken 8/13/2024 1100 by Roly Chinchilla RN  Medication Review/Management: medications reviewed  Taken 8/13/2024 1000 by Roly Chinchilla RN  Medication Review/Management: medications reviewed  Taken 8/13/2024 0800 by Roly Chinchilla RN  Medication Review/Management: medications reviewed     Problem: Behavioral Health Comorbidity  Goal: Maintenance of Behavioral Health Symptom Control  Outcome: Ongoing, Progressing  Intervention: Maintain Behavioral Health Symptom Control  Recent Flowsheet Documentation  Taken 8/13/2024 1500 by Roly Chinchilla RN  Medication Review/Management: medications reviewed  Taken 8/13/2024 1400 by Roly Chinchilla RN  Medication Review/Management: medications reviewed  Taken 8/13/2024 1300 by Roly Chinchilla RN  Medication Review/Management: medications reviewed  Taken 8/13/2024 1200 by Roly Chinchilla RN  Medication Review/Management: medications reviewed  Taken 8/13/2024 1100 by Roly Chinchilla RN  Medication Review/Management: medications reviewed  Taken 8/13/2024 1000 by Roly Chinchilla RN  Medication Review/Management: medications reviewed  Taken 8/13/2024 0800 by Roly Chinchilla RN  Medication Review/Management: medications reviewed     Problem: COPD (Chronic Obstructive Pulmonary Disease) Comorbidity  Goal: Maintenance of COPD Symptom Control  Outcome: Ongoing, Progressing  Intervention: Maintain COPD-Symptom Control  Recent Flowsheet Documentation  Taken 8/13/2024 1500 by Roly Chinchilla RN  Medication Review/Management: medications reviewed  Taken 8/13/2024 1400 by Roly Chinchilla RN  Medication Review/Management: medications reviewed  Taken 8/13/2024 1300 by Roly Chinchilla RN  Medication Review/Management: medications reviewed  Taken 8/13/2024 1200 by Roly Chinchilla RN  Medication  Review/Management: medications reviewed  Taken 8/13/2024 1100 by Roly Chinchilla RN  Medication Review/Management: medications reviewed  Taken 8/13/2024 1000 by Roly Chinchilla RN  Medication Review/Management: medications reviewed  Taken 8/13/2024 0800 by Roly Chinchilla RN  Medication Review/Management: medications reviewed     Problem: Diabetes Comorbidity  Goal: Blood Glucose Level Within Targeted Range  Outcome: Ongoing, Progressing  Intervention: Monitor and Manage Glycemia  Recent Flowsheet Documentation  Taken 8/13/2024 0800 by Roly Chinchilla RN  Glycemic Management: blood glucose monitored     Problem: Heart Failure Comorbidity  Goal: Maintenance of Heart Failure Symptom Control  Outcome: Ongoing, Progressing  Intervention: Maintain Heart Failure-Management  Recent Flowsheet Documentation  Taken 8/13/2024 1500 by Roly Chinchilla RN  Medication Review/Management: medications reviewed  Taken 8/13/2024 1400 by Roly Chinchilla RN  Medication Review/Management: medications reviewed  Taken 8/13/2024 1300 by Roly Chinchilla RN  Medication Review/Management: medications reviewed  Taken 8/13/2024 1200 by Roly Chinchilla RN  Medication Review/Management: medications reviewed  Taken 8/13/2024 1100 by Roly Chinchilla RN  Medication Review/Management: medications reviewed  Taken 8/13/2024 1000 by Roly Chinchilla RN  Medication Review/Management: medications reviewed  Taken 8/13/2024 0800 by Roly Chinchilla RN  Medication Review/Management: medications reviewed     Problem: Hypertension Comorbidity  Goal: Blood Pressure in Desired Range  Outcome: Ongoing, Progressing  Intervention: Maintain Blood Pressure Management  Recent Flowsheet Documentation  Taken 8/13/2024 1500 by Roly Chinchilla RN  Medication Review/Management: medications reviewed  Taken 8/13/2024 1400 by Roly Chinchilla RN  Medication Review/Management: medications reviewed  Taken 8/13/2024 1300 by Roly Chinchilla RN  Medication Review/Management:  medications reviewed  Taken 8/13/2024 1200 by Roly Chinchilla RN  Medication Review/Management: medications reviewed  Taken 8/13/2024 1100 by Roly Chinchilla RN  Medication Review/Management: medications reviewed  Taken 8/13/2024 1000 by Roly Chinchilla RN  Medication Review/Management: medications reviewed  Taken 8/13/2024 0800 by Roly Chinchilla RN  Medication Review/Management: medications reviewed     Problem: Obstructive Sleep Apnea Risk or Actual Comorbidity Management  Goal: Unobstructed Breathing During Sleep  Outcome: Ongoing, Progressing     Problem: Osteoarthritis Comorbidity  Goal: Maintenance of Osteoarthritis Symptom Control  Outcome: Ongoing, Progressing  Intervention: Maintain Osteoarthritis Symptom Control  Recent Flowsheet Documentation  Taken 8/13/2024 1500 by Roly Chinchilla RN  Medication Review/Management: medications reviewed  Taken 8/13/2024 1400 by Roly Chinchilla RN  Activity Management: activity encouraged  Medication Review/Management: medications reviewed  Taken 8/13/2024 1300 by Roly Chinchilla RN  Medication Review/Management: medications reviewed  Taken 8/13/2024 1200 by Roly Chinchilla RN  Medication Review/Management: medications reviewed  Taken 8/13/2024 1100 by Roly Chinchilla RN  Medication Review/Management: medications reviewed  Taken 8/13/2024 1000 by Roly Chinchilla RN  Medication Review/Management: medications reviewed  Taken 8/13/2024 0800 by Roly Chinchilla RN  Activity Management: activity encouraged  Medication Review/Management: medications reviewed     Problem: Pain Chronic (Persistent) (Comorbidity Management)  Goal: Acceptable Pain Control and Functional Ability  Outcome: Ongoing, Progressing  Intervention: Manage Persistent Pain  Recent Flowsheet Documentation  Taken 8/13/2024 1500 by Roly Chinchilla RN  Medication Review/Management: medications reviewed  Taken 8/13/2024 1400 by Roly Chinchilla RN  Medication Review/Management: medications reviewed  Taken 8/13/2024  1300 by Roly Chinchilla RN  Medication Review/Management: medications reviewed  Taken 8/13/2024 1200 by Roly Chinchilla RN  Medication Review/Management: medications reviewed  Taken 8/13/2024 1100 by Roly Chinchilla RN  Medication Review/Management: medications reviewed  Taken 8/13/2024 1000 by Roly Chinchilla RN  Medication Review/Management: medications reviewed  Taken 8/13/2024 0800 by Roly Chinchilla RN  Sleep/Rest Enhancement: consistent schedule promoted  Medication Review/Management: medications reviewed  Intervention: Develop Pain Management Plan  Recent Flowsheet Documentation  Taken 8/13/2024 1400 by Roly Chinchilla RN  Pain Management Interventions: see MAR  Taken 8/13/2024 0800 by Roly Chinchilla RN  Pain Management Interventions: see MAR  Intervention: Optimize Psychosocial Wellbeing  Recent Flowsheet Documentation  Taken 8/13/2024 1400 by Roly Chinchilla RN  Diversional Activities: television  Family/Support System Care:   support provided   self-care encouraged  Taken 8/13/2024 0800 by Roly Chinchilla RN  Diversional Activities: television  Family/Support System Care:   support provided   self-care encouraged     Problem: Seizure Disorder Comorbidity  Goal: Maintenance of Seizure Control  Outcome: Ongoing, Progressing     Problem: Adjustment to Illness (Sepsis/Septic Shock)  Goal: Optimal Coping  Outcome: Ongoing, Progressing  Intervention: Optimize Psychosocial Adjustment to Illness  Recent Flowsheet Documentation  Taken 8/13/2024 1400 by Roly Chinchilla RN  Family/Support System Care:   support provided   self-care encouraged  Taken 8/13/2024 0800 by Roly Chinchilla RN  Family/Support System Care:   support provided   self-care encouraged     Problem: Bleeding (Sepsis/Septic Shock)  Goal: Absence of Bleeding  Outcome: Ongoing, Progressing     Problem: Glycemic Control Impaired (Sepsis/Septic Shock)  Goal: Blood Glucose Level Within Desired Range  Outcome: Ongoing, Progressing  Intervention: Optimize  Glycemic Control  Recent Flowsheet Documentation  Taken 8/13/2024 0800 by Roly Chinchilla RN  Glycemic Management: blood glucose monitored     Problem: Infection Progression (Sepsis/Septic Shock)  Goal: Absence of Infection Signs and Symptoms  Outcome: Ongoing, Progressing  Intervention: Initiate Sepsis Management  Recent Flowsheet Documentation  Taken 8/13/2024 1400 by Roly Chinchilla RN  Infection Prevention:   single patient room provided   rest/sleep promoted  Taken 8/13/2024 0800 by Roly Chinchilla RN  Infection Prevention:   rest/sleep promoted   single patient room provided  Intervention: Promote Recovery  Recent Flowsheet Documentation  Taken 8/13/2024 1400 by Roly Chinchilla RN  Activity Management: activity encouraged  Taken 8/13/2024 0800 by Roly Chinchilla RN  Activity Management: activity encouraged  Sleep/Rest Enhancement: consistent schedule promoted  Intervention: Promote Stabilization  Recent Flowsheet Documentation  Taken 8/13/2024 0800 by Roly Chinchilla RN  Fluid/Electrolyte Management: fluids provided     Problem: Nutrition Impaired (Sepsis/Septic Shock)  Goal: Optimal Nutrition Intake  Outcome: Ongoing, Progressing   Goal Outcome Evaluation:           Progress: no change  Outcome Evaluation: VSS, RA, AxO but slow to respond, Bicarb continues to infuse, no acute events noted, wound care consulted, changed per orders, call light within reach, alarms active and audible, no needs noted at this time

## 2024-08-14 ENCOUNTER — APPOINTMENT (OUTPATIENT)
Dept: CT IMAGING | Facility: HOSPITAL | Age: 64
DRG: 853 | End: 2024-08-14
Payer: MEDICARE

## 2024-08-14 ENCOUNTER — APPOINTMENT (OUTPATIENT)
Dept: GENERAL RADIOLOGY | Facility: HOSPITAL | Age: 64
DRG: 853 | End: 2024-08-14
Payer: MEDICARE

## 2024-08-14 LAB
A-A DO2: 32.2 MMHG (ref 0–300)
ALBUMIN SERPL-MCNC: 2.2 G/DL (ref 3.5–5.2)
ALBUMIN/GLOB SERPL: 1.2 G/DL
ALP SERPL-CCNC: 433 U/L (ref 39–117)
ALT SERPL W P-5'-P-CCNC: 73 U/L (ref 1–33)
ANION GAP SERPL CALCULATED.3IONS-SCNC: 15.5 MMOL/L (ref 5–15)
APTT PPP: 43.3 SECONDS (ref 26.5–34.5)
APTT PPP: 96.4 SECONDS (ref 26.5–34.5)
ARTERIAL PATENCY WRIST A: ABNORMAL
AST SERPL-CCNC: 1095 U/L (ref 1–32)
ATMOSPHERIC PRESS: 729 MMHG
BASE EXCESS BLDA CALC-SCNC: -2.3 MMOL/L (ref 0–2)
BASOPHILS # BLD AUTO: 0 10*3/MM3 (ref 0–0.2)
BASOPHILS # BLD AUTO: 0 10*3/MM3 (ref 0–0.2)
BASOPHILS NFR BLD AUTO: 0 % (ref 0–1.5)
BASOPHILS NFR BLD AUTO: 0 % (ref 0–1.5)
BDY SITE: ABNORMAL
BH BB BLOOD EXPIRATION DATE: NORMAL
BH BB BLOOD TYPE BARCODE: 5100
BH BB DISPENSE STATUS: NORMAL
BH BB PRODUCT CODE: NORMAL
BH BB UNIT NUMBER: NORMAL
BILIRUB SERPL-MCNC: 0.9 MG/DL (ref 0–1.2)
BUN SERPL-MCNC: 21 MG/DL (ref 8–23)
BUN/CREAT SERPL: 6.9 (ref 7–25)
CA-I SERPL ISE-MCNC: 0.95 MMOL/L (ref 1.12–1.32)
CALCIUM SPEC-SCNC: 6.8 MG/DL (ref 8.6–10.5)
CHLORIDE SERPL-SCNC: 100 MMOL/L (ref 98–107)
CO2 BLDA-SCNC: 23 MMOL/L (ref 22–33)
CO2 SERPL-SCNC: 18.5 MMOL/L (ref 22–29)
COHGB MFR BLD: 2.2 % (ref 0–5)
CREAT SERPL-MCNC: 3.03 MG/DL (ref 0.57–1)
CROSSMATCH INTERPRETATION: NORMAL
DEPRECATED RDW RBC AUTO: 52.5 FL (ref 37–54)
DEPRECATED RDW RBC AUTO: 53.1 FL (ref 37–54)
DEPRECATED RDW RBC AUTO: 55.2 FL (ref 37–54)
EGFRCR SERPLBLD CKD-EPI 2021: 16.5 ML/MIN/1.73
EOSINOPHIL # BLD AUTO: 0 10*3/MM3 (ref 0–0.4)
EOSINOPHIL # BLD AUTO: 0 10*3/MM3 (ref 0–0.4)
EOSINOPHIL NFR BLD AUTO: 0 % (ref 0.3–6.2)
EOSINOPHIL NFR BLD AUTO: 0 % (ref 0.3–6.2)
ERYTHROCYTE [DISTWIDTH] IN BLOOD BY AUTOMATED COUNT: 15.4 % (ref 12.3–15.4)
ERYTHROCYTE [DISTWIDTH] IN BLOOD BY AUTOMATED COUNT: 15.5 % (ref 12.3–15.4)
ERYTHROCYTE [DISTWIDTH] IN BLOOD BY AUTOMATED COUNT: 16 % (ref 12.3–15.4)
FIBRINOGEN PPP-MCNC: 214 MG/DL (ref 173–524)
FUNGUS SPEC CULT: NORMAL
FUNGUS SPEC FUNGUS STN: NORMAL
GLOBULIN UR ELPH-MCNC: 1.8 GM/DL
GLUCOSE BLDC GLUCOMTR-MCNC: 149 MG/DL (ref 70–130)
GLUCOSE BLDC GLUCOMTR-MCNC: 155 MG/DL (ref 70–130)
GLUCOSE BLDC GLUCOMTR-MCNC: 175 MG/DL (ref 70–130)
GLUCOSE BLDC GLUCOMTR-MCNC: 204 MG/DL (ref 70–130)
GLUCOSE SERPL-MCNC: 136 MG/DL (ref 65–99)
HAPTOGLOB SERPL-MCNC: 61 MG/DL (ref 30–200)
HCO3 BLDA-SCNC: 21.9 MMOL/L (ref 20–26)
HCT VFR BLD AUTO: 18.9 % (ref 34–46.6)
HCT VFR BLD AUTO: 21.3 % (ref 34–46.6)
HCT VFR BLD AUTO: 26.8 % (ref 34–46.6)
HCT VFR BLD AUTO: 27.4 % (ref 34–46.6)
HCT VFR BLD CALC: 27.6 % (ref 38–51)
HGB BLD-MCNC: 6.3 G/DL (ref 12–15.9)
HGB BLD-MCNC: 7.1 G/DL (ref 12–15.9)
HGB BLD-MCNC: 9.1 G/DL (ref 12–15.9)
HGB BLD-MCNC: 9.2 G/DL (ref 12–15.9)
HGB BLDA-MCNC: 9 G/DL (ref 13.5–17.5)
IMM GRANULOCYTES # BLD AUTO: 0.02 10*3/MM3 (ref 0–0.05)
IMM GRANULOCYTES # BLD AUTO: 0.04 10*3/MM3 (ref 0–0.05)
IMM GRANULOCYTES NFR BLD AUTO: 0.4 % (ref 0–0.5)
IMM GRANULOCYTES NFR BLD AUTO: 0.7 % (ref 0–0.5)
INHALED O2 CONCENTRATION: 21 %
INR PPP: 1.13 (ref 0.9–1.1)
INR PPP: 1.2 (ref 0.9–1.1)
INR PPP: 1.46 (ref 0.9–1.1)
LYMPHOCYTES # BLD AUTO: 0.33 10*3/MM3 (ref 0.7–3.1)
LYMPHOCYTES # BLD AUTO: 0.35 10*3/MM3 (ref 0.7–3.1)
LYMPHOCYTES NFR BLD AUTO: 6.1 % (ref 19.6–45.3)
LYMPHOCYTES NFR BLD AUTO: 6.5 % (ref 19.6–45.3)
Lab: ABNORMAL
MCH RBC QN AUTO: 31 PG (ref 26.6–33)
MCH RBC QN AUTO: 31 PG (ref 26.6–33)
MCH RBC QN AUTO: 31.8 PG (ref 26.6–33)
MCHC RBC AUTO-ENTMCNC: 33.3 G/DL (ref 31.5–35.7)
MCHC RBC AUTO-ENTMCNC: 33.3 G/DL (ref 31.5–35.7)
MCHC RBC AUTO-ENTMCNC: 34.3 G/DL (ref 31.5–35.7)
MCV RBC AUTO: 92.7 FL (ref 79–97)
MCV RBC AUTO: 93 FL (ref 79–97)
MCV RBC AUTO: 93.1 FL (ref 79–97)
METHGB BLD QL: 0.6 % (ref 0–3)
MODALITY: ABNORMAL
MONOCYTES # BLD AUTO: 0.15 10*3/MM3 (ref 0.1–0.9)
MONOCYTES # BLD AUTO: 0.17 10*3/MM3 (ref 0.1–0.9)
MONOCYTES NFR BLD AUTO: 3 % (ref 5–12)
MONOCYTES NFR BLD AUTO: 3 % (ref 5–12)
NEUTROPHILS NFR BLD AUTO: 4.57 10*3/MM3 (ref 1.7–7)
NEUTROPHILS NFR BLD AUTO: 5.14 10*3/MM3 (ref 1.7–7)
NEUTROPHILS NFR BLD AUTO: 90.1 % (ref 42.7–76)
NEUTROPHILS NFR BLD AUTO: 90.2 % (ref 42.7–76)
NRBC BLD AUTO-RTO: 0 /100 WBC (ref 0–0.2)
NRBC BLD AUTO-RTO: 0 /100 WBC (ref 0–0.2)
OXYHGB MFR BLDV: 93.5 % (ref 94–99)
PCO2 BLDA: 34.6 MM HG (ref 35–45)
PCO2 TEMP ADJ BLD: ABNORMAL MM[HG]
PH BLDA: 7.41 PH UNITS (ref 7.35–7.45)
PH, TEMP CORRECTED: ABNORMAL
PLATELET # BLD AUTO: 101 10*3/MM3 (ref 140–450)
PLATELET # BLD AUTO: 104 10*3/MM3 (ref 140–450)
PLATELET # BLD AUTO: 79 10*3/MM3 (ref 140–450)
PMV BLD AUTO: 10.1 FL (ref 6–12)
PMV BLD AUTO: 10.5 FL (ref 6–12)
PMV BLD AUTO: 9.8 FL (ref 6–12)
PO2 BLDA: 72 MM HG (ref 83–108)
PO2 TEMP ADJ BLD: ABNORMAL MM[HG]
POTASSIUM SERPL-SCNC: 3.7 MMOL/L (ref 3.5–5.2)
PROT SERPL-MCNC: 4 G/DL (ref 6–8.5)
PROTHROMBIN TIME: 14.6 SECONDS (ref 12.1–14.7)
PROTHROMBIN TIME: 15.3 SECONDS (ref 12.1–14.7)
PROTHROMBIN TIME: 17.8 SECONDS (ref 12.1–14.7)
RBC # BLD AUTO: 2.03 10*6/MM3 (ref 3.77–5.28)
RBC # BLD AUTO: 2.29 10*6/MM3 (ref 3.77–5.28)
RBC # BLD AUTO: 2.89 10*6/MM3 (ref 3.77–5.28)
REF LAB TEST METHOD: NORMAL
SAO2 % BLDCOA: 96.2 % (ref 94–99)
SODIUM SERPL-SCNC: 134 MMOL/L (ref 136–145)
T4 FREE SERPL-MCNC: 1.08 NG/DL (ref 0.92–1.68)
TSH SERPL DL<=0.05 MIU/L-ACNC: 3.58 UIU/ML (ref 0.27–4.2)
UNIT  ABO: NORMAL
UNIT  RH: NORMAL
VANCOMYCIN SERPL-MCNC: 29.3 MCG/ML (ref 5–40)
VENTILATOR MODE: ABNORMAL
WBC NRBC COR # BLD AUTO: 4.31 10*3/MM3 (ref 3.4–10.8)
WBC NRBC COR # BLD AUTO: 5.07 10*3/MM3 (ref 3.4–10.8)
WBC NRBC COR # BLD AUTO: 5.7 10*3/MM3 (ref 3.4–10.8)

## 2024-08-14 PROCEDURE — 85384 FIBRINOGEN ACTIVITY: CPT | Performed by: NURSE PRACTITIONER

## 2024-08-14 PROCEDURE — 80053 COMPREHEN METABOLIC PANEL: CPT | Performed by: HOSPITALIST

## 2024-08-14 PROCEDURE — 85610 PROTHROMBIN TIME: CPT | Performed by: NURSE PRACTITIONER

## 2024-08-14 PROCEDURE — 82330 ASSAY OF CALCIUM: CPT | Performed by: INTERNAL MEDICINE

## 2024-08-14 PROCEDURE — 86927 PLASMA FRESH FROZEN: CPT

## 2024-08-14 PROCEDURE — 82948 REAGENT STRIP/BLOOD GLUCOSE: CPT

## 2024-08-14 PROCEDURE — 99233 SBSQ HOSP IP/OBS HIGH 50: CPT | Performed by: HOSPITALIST

## 2024-08-14 PROCEDURE — 94799 UNLISTED PULMONARY SVC/PX: CPT

## 2024-08-14 PROCEDURE — 25010000002 PIPERACILLIN SOD-TAZOBACTAM PER 1 G: Performed by: HOSPITALIST

## 2024-08-14 PROCEDURE — 25010000002 HYDROCORTISONE SOD SUC (PF) 100 MG RECONSTITUTED SOLUTION: Performed by: INTERNAL MEDICINE

## 2024-08-14 PROCEDURE — 84439 ASSAY OF FREE THYROXINE: CPT | Performed by: INTERNAL MEDICINE

## 2024-08-14 PROCEDURE — 85610 PROTHROMBIN TIME: CPT | Performed by: INTERNAL MEDICINE

## 2024-08-14 PROCEDURE — 70450 CT HEAD/BRAIN W/O DYE: CPT | Performed by: RADIOLOGY

## 2024-08-14 PROCEDURE — 85730 THROMBOPLASTIN TIME PARTIAL: CPT | Performed by: NURSE PRACTITIONER

## 2024-08-14 PROCEDURE — 86900 BLOOD TYPING SEROLOGIC ABO: CPT

## 2024-08-14 PROCEDURE — 70450 CT HEAD/BRAIN W/O DYE: CPT

## 2024-08-14 PROCEDURE — 94761 N-INVAS EAR/PLS OXIMETRY MLT: CPT

## 2024-08-14 PROCEDURE — P9016 RBC LEUKOCYTES REDUCED: HCPCS

## 2024-08-14 PROCEDURE — 83050 HGB METHEMOGLOBIN QUAN: CPT

## 2024-08-14 PROCEDURE — 85610 PROTHROMBIN TIME: CPT | Performed by: HOSPITALIST

## 2024-08-14 PROCEDURE — 85027 COMPLETE CBC AUTOMATED: CPT | Performed by: NURSE PRACTITIONER

## 2024-08-14 PROCEDURE — 36600 WITHDRAWAL OF ARTERIAL BLOOD: CPT

## 2024-08-14 PROCEDURE — 94664 DEMO&/EVAL PT USE INHALER: CPT

## 2024-08-14 PROCEDURE — 99232 SBSQ HOSP IP/OBS MODERATE 35: CPT | Performed by: NURSE PRACTITIONER

## 2024-08-14 PROCEDURE — 85730 THROMBOPLASTIN TIME PARTIAL: CPT | Performed by: HOSPITALIST

## 2024-08-14 PROCEDURE — 82542 COL CHROMOTOGRAPHY QUAL/QUAN: CPT | Performed by: HOSPITALIST

## 2024-08-14 PROCEDURE — P9059 PLASMA, FRZ BETWEEN 8-24HOUR: HCPCS

## 2024-08-14 PROCEDURE — 36430 TRANSFUSION BLD/BLD COMPNT: CPT

## 2024-08-14 PROCEDURE — 99291 CRITICAL CARE FIRST HOUR: CPT | Performed by: INTERNAL MEDICINE

## 2024-08-14 PROCEDURE — 25010000002 HYDRALAZINE PER 20 MG: Performed by: INTERNAL MEDICINE

## 2024-08-14 PROCEDURE — 82375 ASSAY CARBOXYHB QUANT: CPT

## 2024-08-14 PROCEDURE — 80202 ASSAY OF VANCOMYCIN: CPT | Performed by: HOSPITALIST

## 2024-08-14 PROCEDURE — 82306 VITAMIN D 25 HYDROXY: CPT | Performed by: INTERNAL MEDICINE

## 2024-08-14 PROCEDURE — 63710000001 INSULIN LISPRO (HUMAN) PER 5 UNITS: Performed by: HOSPITALIST

## 2024-08-14 PROCEDURE — 85014 HEMATOCRIT: CPT | Performed by: HOSPITALIST

## 2024-08-14 PROCEDURE — 82805 BLOOD GASES W/O2 SATURATION: CPT

## 2024-08-14 PROCEDURE — 86022 PLATELET ANTIBODIES: CPT | Performed by: HOSPITALIST

## 2024-08-14 PROCEDURE — 85025 COMPLETE CBC W/AUTO DIFF WBC: CPT | Performed by: HOSPITALIST

## 2024-08-14 PROCEDURE — 25010000002 PHYTONADIONE 10 MG/ML SOLUTION 1 ML AMPULE: Performed by: HOSPITALIST

## 2024-08-14 PROCEDURE — 85018 HEMOGLOBIN: CPT | Performed by: HOSPITALIST

## 2024-08-14 RX ORDER — TRANEXAMIC ACID 10 MG/ML
1000 INJECTION, SOLUTION INTRAVENOUS ONCE
Status: COMPLETED | OUTPATIENT
Start: 2024-08-14 | End: 2024-08-14

## 2024-08-14 RX ORDER — OXYCODONE HYDROCHLORIDE 5 MG/1
5 TABLET ORAL EVERY 4 HOURS PRN
Status: DISCONTINUED | OUTPATIENT
Start: 2024-08-14 | End: 2024-08-21 | Stop reason: HOSPADM

## 2024-08-14 RX ORDER — HYDRALAZINE HYDROCHLORIDE 20 MG/ML
10 INJECTION INTRAMUSCULAR; INTRAVENOUS EVERY 6 HOURS PRN
Status: DISCONTINUED | OUTPATIENT
Start: 2024-08-14 | End: 2024-08-21 | Stop reason: HOSPADM

## 2024-08-14 RX ADMIN — Medication 10 ML: at 09:57

## 2024-08-14 RX ADMIN — ASPIRIN 81 MG: 81 TABLET, COATED ORAL at 08:00

## 2024-08-14 RX ADMIN — MUPIROCIN 1 APPLICATION: 20 OINTMENT TOPICAL at 09:57

## 2024-08-14 RX ADMIN — Medication 10 ML: at 20:27

## 2024-08-14 RX ADMIN — SODIUM BICARBONATE: 84 INJECTION INTRAVENOUS at 08:00

## 2024-08-14 RX ADMIN — MUPIROCIN 1 APPLICATION: 20 OINTMENT TOPICAL at 20:27

## 2024-08-14 RX ADMIN — LACOSAMIDE 50 MG: 50 TABLET, FILM COATED ORAL at 08:00

## 2024-08-14 RX ADMIN — FOLIC ACID 1 MG: 1 TABLET ORAL at 08:00

## 2024-08-14 RX ADMIN — HYDROCORTISONE SODIUM SUCCINATE 50 MG: 100 INJECTION, POWDER, FOR SOLUTION INTRAMUSCULAR; INTRAVENOUS at 05:47

## 2024-08-14 RX ADMIN — HYDROCORTISONE SODIUM SUCCINATE 50 MG: 100 INJECTION, POWDER, FOR SOLUTION INTRAMUSCULAR; INTRAVENOUS at 17:09

## 2024-08-14 RX ADMIN — CALCIUM CHLORIDE INJECTION 1000 MG: 100 INJECTION, SOLUTION INTRAVENOUS at 13:58

## 2024-08-14 RX ADMIN — LEVOTHYROXINE SODIUM 100 MCG: 0.1 TABLET ORAL at 07:59

## 2024-08-14 RX ADMIN — INSULIN LISPRO 2 UNITS: 100 INJECTION, SOLUTION INTRAVENOUS; SUBCUTANEOUS at 08:01

## 2024-08-14 RX ADMIN — SODIUM BICARBONATE: 84 INJECTION INTRAVENOUS at 23:42

## 2024-08-14 RX ADMIN — PHYTONADIONE 5 MG: 10 INJECTION, EMULSION INTRAMUSCULAR; INTRAVENOUS; SUBCUTANEOUS at 08:00

## 2024-08-14 RX ADMIN — FLUOXETINE HYDROCHLORIDE 10 MG: 10 CAPSULE ORAL at 08:00

## 2024-08-14 RX ADMIN — TRANEXAMIC ACID 1000 MG: 10 INJECTION, SOLUTION INTRAVENOUS at 13:58

## 2024-08-14 RX ADMIN — PIPERACILLIN SODIUM AND TAZOBACTAM SODIUM 3.38 G: 3; .375 INJECTION, POWDER, LYOPHILIZED, FOR SOLUTION INTRAVENOUS at 08:01

## 2024-08-14 RX ADMIN — HYDROCORTISONE SODIUM SUCCINATE 50 MG: 100 INJECTION, POWDER, FOR SOLUTION INTRAMUSCULAR; INTRAVENOUS at 13:59

## 2024-08-14 RX ADMIN — OXYCODONE 5 MG: 5 TABLET ORAL at 07:59

## 2024-08-14 RX ADMIN — PIPERACILLIN SODIUM AND TAZOBACTAM SODIUM 3.38 G: 3; .375 INJECTION, POWDER, LYOPHILIZED, FOR SOLUTION INTRAVENOUS at 20:26

## 2024-08-14 RX ADMIN — INSULIN LISPRO 2 UNITS: 100 INJECTION, SOLUTION INTRAVENOUS; SUBCUTANEOUS at 17:08

## 2024-08-14 RX ADMIN — INSULIN LISPRO 3 UNITS: 100 INJECTION, SOLUTION INTRAVENOUS; SUBCUTANEOUS at 20:45

## 2024-08-14 RX ADMIN — PANTOPRAZOLE SODIUM 40 MG: 40 TABLET, DELAYED RELEASE ORAL at 07:59

## 2024-08-14 RX ADMIN — FERROUS SULFATE TAB 325 MG (65 MG ELEMENTAL FE) 325 MG: 325 (65 FE) TAB at 07:59

## 2024-08-14 RX ADMIN — OXYCODONE HYDROCHLORIDE 5 MG: 5 TABLET ORAL at 12:05

## 2024-08-14 RX ADMIN — HYDRALAZINE HYDROCHLORIDE 10 MG: 20 INJECTION INTRAMUSCULAR; INTRAVENOUS at 17:08

## 2024-08-14 NOTE — CASE MANAGEMENT/SOCIAL WORK
Discharge Planning Assessment   Jose Alfredo     Patient Name: Reny Elena  MRN: [id number]  Today's Date: 8/14/2024    Admit Date: 8/2/2024     Discharge Plan       Row Name 08/14/24 1012       Plan    Plan SS dicussed pt with CM. Palliative Care consulted on this date.  Pt has Right [facility] on 8/9/24. Pt lives with significant other.  [facility] utilizes [facility]  for [facility] services and will require at new home health order at discharge. Pt utilizes Peritoneal Dialysis, wheelchair, bedside commode, glucometer, bp cuff, shower chair, rollator, cane, standard walker via [facility]. Pt requesting Hospital bed at discharge. SS to discuss discharge planning once pt is stable. SS to follow.               CAITY Dolan

## 2024-08-14 NOTE — PROGRESS NOTES
Pulmonary and critical care progress note    Chief complaint -generalized fatigue      Subjective-overnight events reviewed.  All the labs medications ins and outs and vitals reviewed.  Patient had bleeding overnight.  On my assessment blood pressure was low.  MDR done with patient's nurse and RT.  Do not recommend insertion of an NG tube as patient is currently bleeding and INR is elevated.  Review of Systems  No changes        History  Past Medical History:   Diagnosis Date    Arthritis     Closed fracture of right fibula and tibia 2018    Depression     Diabetic ulcer of left foot associated with type 2 diabetes mellitus 2017    Diastolic dysfunction     Disease of thyroid gland     Elevated cholesterol     End stage renal disease     Essential hypertension     GERD (gastroesophageal reflux disease)     History of transfusion     Hyperlipidemia     Iron deficiency anemia 2018    PAD (peripheral artery disease)     Renal failure     Type 2 diabetes mellitus with hyperglycemia, with long-term current use of insulin 2018   ,   Past Surgical History:   Procedure Laterality Date    ABDOMINAL SURGERY      ABOVE KNEE AMPUTATION Right 2024    Procedure: AMPUTATION ABOVE KNEE;  Surgeon: Angelo Santoro MD;  Location: [facility] OR;  Service: General;  Laterality: Right;    BACK SURGERY      Post spinal diskectomy, osteophytectomy in one lumbar interspace    CATARACT EXTRACTION      Left      SECTION      DENTAL PROCEDURE      ENDOSCOPY      FRACTURE SURGERY      right leg    HYSTERECTOMY      INCISION AND DRAINAGE LEG Left 4/15/2017    Procedure: INCISION AND DRAINAGE LOWER EXTREMITY;  Surgeon: Basilio Morris MD;  Location: [facility] OR;  Service:     INCISION AND DRAINAGE LEG Left 2017    Procedure: Irrigation and Debridment abcess diabetic wound left foot with deep culture;  Surgeon: Basilio Morris MD;  Location: [facility] OR;  Service:     INSERTION PERITONEAL DIALYSIS CATHETER N/A  12/16/2021    Procedure: INSERTION PERITONEAL DIALYSIS CATHETER LAPAROSCOPIC;  Surgeon: Edy Glover MD;  Location:  [facility] OR;  Service: General;  Laterality: N/A;    KNEE ARTHROSCOPY Right     ORIF TIBIA FRACTURE Right 12/28/2018    Procedure: TIBIAL  FRACTURE OPEN REDUCTION INTERNAL FIXATION;  Surgeon: Jung Barragan MD;  Location:  [facility] OR;  Service: Orthopedics    TOE AMPUTATION Right     5th    TUBAL ABDOMINAL LIGATION     ,   Family History   Problem Relation Age of Onset    Heart disease Mother     Cancer Mother     COPD Mother     Diabetes Other     Depression Other    ,   Social History     Tobacco Use    Smoking status: Never     Passive exposure: Never    Smokeless tobacco: Never   Vaping Use    Vaping status: Never Used   Substance Use Topics    Alcohol use: No    Drug use: No   ,   Medications Prior to Admission   Medication Sig Dispense Refill Last Dose    aspirin 81 MG EC tablet Take 1 tablet by mouth Daily.   8/2/2024    cefdinir (OMNICEF) 300 MG capsule Take 1 capsule by mouth Every Other Day.   8/2/2024    ferrous sulfate 325 (65 FE) MG tablet Take 1 tablet by mouth Daily With Breakfast.   8/2/2024    FLUoxetine (PROzac) 40 MG capsule Take 1 capsule by mouth Daily.   8/2/2024    fluticasone (FLONASE) 50 MCG/ACT nasal spray 2 sprays into the nostril(s) as directed by provider Daily As Needed for Rhinitis.   Past Week    folic acid (FOLVITE) 1 MG tablet Take 1 tablet by mouth Daily.   8/2/2024    HYDROcodone-acetaminophen (NORCO)  MG per tablet Take 1 tablet by mouth 3 (Three) Times a Day As Needed for Moderate Pain.   8/2/2024    hydrOXYzine (ATARAX) 25 MG tablet Take 1 tablet by mouth 3 (Three) Times a Day As Needed for Anxiety. 15 tablet 0 8/2/2024    Insulin Aspart, w/Niacinamide, (Fiasp) 100 UNIT/ML solution Inject 2-7 Units as directed 4 (Four) Times a Day Before Meals & at Bedtime.   8/2/2024    lacosamide (VIMPAT) 50 MG tablet tablet Take 1 tablet by mouth Every 12 (Twelve) Hours.    8/2/2024    levothyroxine (SYNTHROID, LEVOTHROID) 100 MCG tablet Take 1 tablet by mouth Daily.   8/2/2024    lidocaine (LIDODERM) 5 % Place 1 patch on the skin as directed by provider Daily As Needed for Mild Pain. Remove & Discard patch within 12 hours or as directed by MD   Past Week    losartan (COZAAR) 50 MG tablet Take 1 tablet by mouth Daily.   8/2/2024    NIFEdipine XL (PROCARDIA XL) 90 MG 24 hr tablet Take 1 tablet by mouth Daily.   8/2/2024    ondansetron ODT (ZOFRAN-ODT) 4 MG disintegrating tablet Place 1 tablet on the tongue 3 (Three) Times a Day As Needed for Nausea or Vomiting.   Past Week    pantoprazole (PROTONIX) 40 MG EC tablet Take 1 tablet by mouth Daily.   8/2/2024    potassium chloride (KLOR-CON M20) 20 MEQ CR tablet Take 1 tablet by mouth 2 (Two) Times a Day.   8/2/2024    rOPINIRole (REQUIP) 0.5 MG tablet Take 1 tablet by mouth 2 (Two) Times a Day.   8/2/2024    tiZANidine (ZANAFLEX) 4 MG tablet Take 1 tablet by mouth Every 6 (Six) Hours As Needed for Muscle Spasms.   8/2/2024    atorvastatin (LIPITOR) 80 MG tablet Take 1 tablet by mouth Every Night. 30 tablet 0 8/1/2024    traZODone (DESYREL) 50 MG tablet Take 1 tablet by mouth Every Night.   8/1/2024   , Scheduled Meds:  aspirin, 81 mg, Oral, Daily  [Held by provider] atorvastatin, 80 mg, Oral, Nightly  ferrous sulfate, 325 mg, Oral, Daily With Breakfast  FLUoxetine, 10 mg, Oral, Daily  folic acid, 1 mg, Oral, Daily  hydrocortisone sodium succinate, 50 mg, Intravenous, Q6H  insulin lispro, 2-7 Units, Subcutaneous, 4x Daily AC & at Bedtime  lacosamide, 50 mg, Oral, Q12H  levothyroxine, 100 mcg, Oral, QAM AC  midodrine, 7.5 mg, Oral, TID AC  mupirocin, 1 Application, Topical, Q12H  pantoprazole, 40 mg, Oral, QAM AC  piperacillin-tazobactam, 3.375 g, Intravenous, Q12H  sodium chloride, 1,000 mL, Intravenous, Once  sodium chloride, 10 mL, Intravenous, Q12H  sodium chloride, 10 mL, Intravenous, Q12H  sodium chloride, 10 mL, Intravenous,  Q12H  sodium chloride, 10 mL, Intravenous, Q12H  sodium chloride, 10 mL, Intravenous, Q12H  traZODone, 25 mg, Oral, Nightly  Vancomycin Pharmacy Intermittent/Pulse Dosing, , Does not apply, Daily    , Continuous Infusions:  norepinephrine, 0.02-0.3 mcg/kg/min, Last Rate: Stopped (08/13/24 0322)  Pharmacy Consult - Pharmacy to dose,   Pharmacy Consult - Pharmacy to dose,   phenylephrine, 0.5-3 mcg/kg/min, Last Rate: Stopped (08/13/24 0519)  sodium chloride 0.45 % 1,000 mL with sodium bicarbonate 8.4 % 75 mEq infusion, , Last Rate: 75 mL/hr at 08/14/24 0800     and Allergies:  Morphine, Penicillins, Codeine, and Sulfa antibiotics    Objective     Vital Signs   Temp:  [96.6 °F (35.9 °C)-98.1 °F (36.7 °C)] 98 °F (36.7 °C)  Heart Rate:  [] 94  Resp:  [8-18] 12  BP: ()/() 170/94    Physical Exam:             General-chronically ill in appearance  HEENT-PERRLA    Neck-supple    Respiratory-respirations normal-on auscultation no wheezing no crackles,    Cardiovascular-  Normal S1 and S2. No S3, S4 or murmurs. No JVD, no carotid bruit and no edema, pulses normal bilaterally     GI-nontender nondistended bowel sounds positive    CNS-nonfocal    Musculoskeletal -no edema, right above-knee amputation  Extremities- no obvious deformity noticed     Psychiatric-alert awake oriented  Skin- no visible rash                                                                   Results Review:    LABS:    Lab Results   Component Value Date    GLUCOSE 136 (H) 08/14/2024    BUN 21 08/14/2024    CREATININE 3.03 (H) 08/14/2024    EGFRIFNONA 8 (L) 02/06/2022    EGFRIFAFRI  02/06/2022      Comment:      <15 Indicative of kidney failure.    BCR 6.9 (L) 08/14/2024    CO2 18.5 (L) 08/14/2024    CALCIUM 6.8 (L) 08/14/2024    PROTENTOTREF 6.1 12/08/2020    ALBUMIN 2.2 (L) 08/14/2024    LABIL2 1.2 12/08/2020    AST 1,095 (H) 08/14/2024    ALT 73 (H) 08/14/2024    WBC 5.70 08/14/2024    HGB 9.2 (L) 08/14/2024    HCT 26.8 (L)  08/14/2024    MCV 92.7 08/14/2024     (L) 08/14/2024     (L) 08/14/2024    K 3.7 08/14/2024     08/14/2024    ANIONGAP 15.5 (H) 08/14/2024       Lab Results   Component Value Date    INR 1.46 (H) 08/14/2024    INR 2.13 (H) 08/13/2024    INR 0.96 08/09/2024    PROTIME 17.8 (H) 08/14/2024    PROTIME 23.9 (H) 08/13/2024    PROTIME 12.8 08/09/2024       Results from last 7 days   Lab Units 08/14/24  0702 08/13/24  2154 08/09/24  0049   INR  1.46* 2.13* 0.96   APTT seconds 96.4* 146.4*  --           I reviewed the patient's new clinical results.  I reviewed the patient's new imaging results and agree with the interpretation.  Microbiology Results (last 10 days)       Procedure Component Value - Date/Time    Body Fluid Culture - Body Fluid, Peritoneum [[id number]] Collected: 08/13/24 1253    Lab Status: Preliminary result Specimen: Body Fluid from Peritoneum Updated: 08/14/24 0100     Body Fluid Culture No growth at less than 24 hours     Gram Stain WBCs seen      No organisms seen    Blood Culture - Blood, Arm, Left [[id number]]  (Normal) Collected: 08/12/24 1917    Lab Status: Preliminary result Specimen: Blood from Arm, Left Updated: 08/13/24 1931     Blood Culture No growth at 24 hours    MRSA Screen, PCR (Inpatient) - Swab, Nares [[id number]]  (Normal) Collected: 08/11/24 1557    Lab Status: Final result Specimen: Swab from Nares Updated: 08/11/24 1716     MRSA PCR No MRSA Detected    Narrative:      The negative predictive value of this diagnostic test is high and should only be used to consider de-escalating anti-MRSA therapy. A positive result may indicate colonization with MRSA and must be correlated clinically.            Latest Reference Range & Units 08/11/24 12:14   pH, Arterial 7.350 - 7.450 pH units 7.423   pCO2, Arterial 35.0 - 45.0 mm Hg 28.7 (L)   pO2, Arterial 83.0 - 108.0 mm Hg 106.0   HCO3, Arterial 20.0 - 26.0 mmol/L 18.7 (L)   Base Excess 0.0 - 2.0 mmol/L -5.1 (L)   O2 Saturation,  Arterial 94.0 - 99.0 % 99.0   CO2 Content 22 - 33 mmol/L 19.6 (L)   A-a DO2 0.0 - 300.0 mmHg 4.7   Carboxyhemoglobin 0 - 5 % 1.3   Methemoglobin 0.00 - 3.00 % 0.70   Oxyhemoglobin 94 - 99 % 97.0   Hematocrit, Blood Gas 38.0 - 51.0 % 20.8 (L)   Hemoglobin, Blood Gas 13.5 - 17.5 g/dL 6.8 (C)   Site  Right Radial   Óscar's Test  Positive   Modality  Room Air   FIO2 % 21   Ventilator Mode  NA   Barometric Pressure for Blood Gas mmHg 729   Notified By  [id number]   Notified Time  08/11/2024 12:18   Notified Who  GLORIA ESCALANTE   (C): Data is critically low  (L): Data is abnormally low   Latest Reference Range & Units 08/14/24 05:03   pH, Arterial 7.350 - 7.450 pH units 7.411   pCO2, Arterial 35.0 - 45.0 mm Hg 34.6 (L)   pO2, Arterial 83.0 - 108.0 mm Hg 72.0 (L)   HCO3, Arterial 20.0 - 26.0 mmol/L 21.9   Base Excess 0.0 - 2.0 mmol/L -2.3 (L)   O2 Saturation, Arterial 94.0 - 99.0 % 96.2   CO2 Content 22 - 33 mmol/L 23.0   A-a DO2 0.0 - 300.0 mmHg 32.2   Carboxyhemoglobin 0 - 5 % 2.2   Methemoglobin 0.00 - 3.00 % 0.60   Oxyhemoglobin 94 - 99 % 93.5 (L)   Hematocrit, Blood Gas 38.0 - 51.0 % 27.6 (L)   Hemoglobin, Blood Gas 13.5 - 17.5 g/dL 9.0 (L)   Site  Left Brachial   Óscar's Test  N/A   Modality  Room Air   FIO2 % 21   Ventilator Mode  NA   Barometric Pressure for Blood Gas mmHg 729   (L): Data is abnormally low  Assessment & Plan     Neurology-alert awake able to protect her airway.  Continue to monitor neurological status    Respiratory-latest chest x-ray reviewed.  FiO2 requirement reviewed and adjusted to maintain saturation 88 to 92%.  VBG from today morning reviewed.  Does not show any major acid-base disorder.  Will repeat tomorrow morning.      Cardiology-   Septic shock-likely due to the foot ulcer and then amputation.  Was on Levophed.  Titrated off.  Continue midodrine to maintain a MAP of 65 and above.  Dose reviewed and adjusted.  In the morning blood pressures were on the lower side.  Patient will be receiving  units of FFP as INR was high.  If the blood pressure remains low will start vasopressors.     Continue stress dose steroids.  Continue to monitor HR- rate and rhythm, BP         Lab 08/13/24  2154 08/13/24  0616 08/13/24  0005 08/12/24  1812 08/12/24  1312   LACTATE 1.5 2.5* 3.5* 2.3* 2.3*      Nephrology- Cr and BUN stable  I/O-reviewed.  Creatinine elevated.  Nephrology on case.  Case was briefly discussed with them.  Continue management as per them.  Continue fluids as per nephrology.  Replacement fluids reviewed.  Ionized calcium low.  Will give 1 g of calcium chloride.    Will repeat ionized calcium level tomorrow morning.    Will get vitamin D level tomorrow morning.      GI- PPI prophylaxis  Continue current diet.  .  Transaminitis-likely due to shock liver.    Levels better today.  Elevated INR-vitamin K given.  2 units of FFP given.  Transfuse for hemoglobin less than 7.    Narrative & Impression   PROCEDURE: Abdominal ultrasound evaluation performed on August 11, 2024.  Color flow imaging performed.     HISTORY: Elevated liver function tests.     COMPARISON: None.     FINDINGS:     Echogenic liver suggestive of fatty infiltration.  Small volume of free fluid adjacent to the liver consistent with  ascites.  Patent hepatic veins with appropriate direction of flow.  Patent main portal vein with appropriate direction of flow.  No hepatic mass  No dilated intrahepatic bile ducts  Sludge noted in the gallbladder lumen.  No gallbladder wall thickening or para cholecystic fluid.  No right renal hydronephrosis  No features of cholecystitis.     IMPRESSION:     1.  Echogenic liver suggestive of fatty infiltration.  2.  Patent main portal vein with appropriate direction of flow.  3.  Patent hepatic veins with appropriate direction of flow.  4.  Sludge noted in the gallbladder lumen.  5.  No features of cholecystitis.  6.  Small volume of free fluid in the right upper quadrant consistent  with ascites.  7.  No hepatic  mass or cyst  8.  No dilated intrahepatic bile ducts.        If LFTs does not improve will order HIDA scan.     Hematology- CBC  Hb-transfuse for hemoglobin less than 7  platelet  WBC    ID sepsis and septic shock-likely due to the ulcer.  Status post amputation.    Culture-reviewed  And Antibiotics-continue.  Cultures reviewed    Endrocrinology- Maintain Blood sugar 140 -180   Latest Reference Range & Units 08/13/24 00:04 08/13/24 23:32   TSH Baseline 0.270 - 4.200 uIU/mL  3.580   Free T4 0.92 - 1.68 ng/dL 1.88 (H)    (H): Data is abnormally high    Will repeat free T4 level tomorrow morning    Electrolytes-   Mag and phos       DVT prophylaxis-    Bedside rounds were done with RT and patient's nurse. All the lab and clinical findings were discussed with them and plan was also discussed in great detail.    Family member present-none    Overall prognosis poor.  Will get consult palliative care.    Echo-    Results for orders placed during the hospital encounter of 08/19/23    Adult Transthoracic Echo Complete W/ Cont if Necessary Per Protocol    Interpretation Summary    Left ventricular systolic function is normal. Left ventricular ejection fraction appears to be 56 - 60%.    Left ventricular diastolic function is consistent with (grade I) impaired relaxation.    Left atrial volume is mildly increased.    Moderate mitral valve stenosis is present.    Estimated right ventricular systolic pressure from tricuspid regurgitation is mildly elevated (35-45 mmHg).    Mild pulmonary hypertension is present.    There is no evidence of pericardial effusion.  Patient is currently critically ill due to septic shock currently on vasopressors, shock liver, HORACE and infection.  I adjusted patient's medications.cc 31 mins  Case discussed with patient's nurse and primary team.          Diabetic ulcer of right heel          Johnson Rider MD  08/14/24  12:55 EDT

## 2024-08-14 NOTE — PLAN OF CARE
Problem: Pain Acute  Goal: Acceptable Pain Control and Functional Ability  8/14/2024 0332 by Rachel Haddad RN  Outcome: Ongoing, Not Progressing  8/14/2024 0332 by Rachel Haddad RN  Outcome: Ongoing, Progressing  Intervention: Prevent or Manage Pain  Recent Flowsheet Documentation  Taken 8/14/2024 0200 by Rachel Haddad RN  Medication Review/Management: medications reviewed  Taken 8/14/2024 0000 by Rachel Haddad RN  Medication Review/Management: medications reviewed  Taken 8/13/2024 2200 by Rachel Haddad RN  Medication Review/Management: medications reviewed  Taken 8/13/2024 2000 by Rachel Haddad RN  Medication Review/Management: medications reviewed     Problem: Impaired Wound Healing  Goal: Optimal Wound Healing  8/14/2024 0332 by Rachel Haddad RN  Outcome: Ongoing, Not Progressing  8/14/2024 0332 by Rachel Haddad RN  Outcome: Ongoing, Progressing  Intervention: Promote Wound Healing  Recent Flowsheet Documentation  Taken 8/14/2024 0200 by Rachel Haddad RN  Activity Management: bedrest  Taken 8/13/2024 2000 by Rachel Haddad RN  Activity Management: (aka pt unable to ambulate) bedrest     Problem: Adult Inpatient Plan of Care  Goal: Plan of Care Review  8/14/2024 0332 by Rachel Haddad RN  Outcome: Ongoing, Not Progressing  8/14/2024 0332 by Rachel Haddad RN  Outcome: Ongoing, Progressing  Goal: Patient-Specific Goal (Individualized)  8/14/2024 0332 by Rachel Haddad RN  Outcome: Ongoing, Not Progressing  8/14/2024 0332 by Rachel Haddad RN  Outcome: Ongoing, Progressing  Goal: Absence of Hospital-Acquired Illness or Injury  8/14/2024 0332 by Rachel Haddad RN  Outcome: Ongoing, Not Progressing  8/14/2024 0332 by Rachel Haddad RN  Outcome: Ongoing, Progressing  Intervention: Identify and Manage Fall Risk  Recent Flowsheet Documentation  Taken 8/14/2024 0200 by Rachel Haddad RN  Safety Promotion/Fall  Prevention: safety round/check completed  Taken 8/14/2024 0100 by Rachel Haddad RN  Safety Promotion/Fall Prevention: safety round/check completed  Taken 8/14/2024 0000 by Rosenbalm, Rachel, RN  Safety Promotion/Fall Prevention: safety round/check completed  Taken 8/13/2024 2300 by Rachel Haddad RN  Safety Promotion/Fall Prevention: safety round/check completed  Taken 8/13/2024 2200 by Rachel Haddad RN  Safety Promotion/Fall Prevention: safety round/check completed  Taken 8/13/2024 2100 by Rachel Haddad RN  Safety Promotion/Fall Prevention: safety round/check completed  Taken 8/13/2024 2000 by Rosenbalm, Rachel, RN  Safety Promotion/Fall Prevention: safety round/check completed  Taken 8/13/2024 1900 by Rosenbalm, Rachel, RN  Safety Promotion/Fall Prevention: safety round/check completed  Intervention: Prevent Skin Injury  Recent Flowsheet Documentation  Taken 8/14/2024 0200 by Rachel Haddad RN  Body Position:   right   turned  Taken 8/14/2024 0000 by Rachel Haddad RN  Body Position:   left   turned  Taken 8/13/2024 2200 by Rachel Haddad RN  Body Position:   right   turned  Taken 8/13/2024 2000 by Rachel Haddad RN  Body Position:   left   turned  Skin Protection: adhesive use limited  Intervention: Prevent and Manage VTE (Venous Thromboembolism) Risk  Recent Flowsheet Documentation  Taken 8/14/2024 0200 by Rachel Haddad RN  Activity Management: bedrest  VTE Prevention/Management:   bilateral   sequential compression devices on  Taken 8/13/2024 2000 by Rachel Haddad RN  Activity Management: (aka pt unable to ambulate) bedrest  VTE Prevention/Management: (hep dose held due to bleeding at central line) other (see comments)  Goal: Optimal Comfort and Wellbeing  8/14/2024 0332 by Rachel Haddad RN  Outcome: Ongoing, Not Progressing  8/14/2024 0332 by Rachel Haddad RN  Outcome: Ongoing, Progressing  Intervention: Provide  Person-Centered Care  Recent Flowsheet Documentation  Taken 8/14/2024 0200 by Rachel Haddad RN  Trust Relationship/Rapport:   care explained   choices provided  Taken 8/13/2024 2000 by Rachel Haddad RN  Trust Relationship/Rapport:   care explained   choices provided  Goal: Readiness for Transition of Care  8/14/2024 0332 by Rachel Haddad RN  Outcome: Ongoing, Not Progressing  8/14/2024 0332 by Rachel Haddad RN  Outcome: Ongoing, Progressing     Problem: Fall Injury Risk  Goal: Absence of Fall and Fall-Related Injury  8/14/2024 0332 by Rachel Haddad RN  Outcome: Ongoing, Not Progressing  8/14/2024 0332 by Rachel Haddad RN  Outcome: Ongoing, Progressing  Intervention: Identify and Manage Contributors  Recent Flowsheet Documentation  Taken 8/14/2024 0200 by Rachel Haddad RN  Medication Review/Management: medications reviewed  Taken 8/14/2024 0000 by Rachel Haddad RN  Medication Review/Management: medications reviewed  Taken 8/13/2024 2200 by Rachel Haddad RN  Medication Review/Management: medications reviewed  Taken 8/13/2024 2000 by Rachel Haddad RN  Medication Review/Management: medications reviewed  Intervention: Promote Injury-Free Environment  Recent Flowsheet Documentation  Taken 8/14/2024 0200 by Rachel Haddad RN  Safety Promotion/Fall Prevention: safety round/check completed  Taken 8/14/2024 0100 by Rachel Haddad RN  Safety Promotion/Fall Prevention: safety round/check completed  Taken 8/14/2024 0000 by Rosenbalm, Rachel, RN  Safety Promotion/Fall Prevention: safety round/check completed  Taken 8/13/2024 2300 by Rachel Haddad RN  Safety Promotion/Fall Prevention: safety round/check completed  Taken 8/13/2024 2200 by Rachel Haddad RN  Safety Promotion/Fall Prevention: safety round/check completed  Taken 8/13/2024 2100 by Rachel Haddad RN  Safety Promotion/Fall Prevention: safety round/check completed  Taken  8/13/2024 2000 by Rosenbalm, Rachel, RN  Safety Promotion/Fall Prevention: safety round/check completed  Taken 8/13/2024 1900 by Rosenbalm, Rachel, RN  Safety Promotion/Fall Prevention: safety round/check completed     Problem: Skin Injury Risk Increased  Goal: Skin Health and Integrity  8/14/2024 0332 by Rachel Haddad RN  Outcome: Ongoing, Not Progressing  8/14/2024 0332 by Rachel Haddad RN  Outcome: Ongoing, Progressing  Intervention: Optimize Skin Protection  Recent Flowsheet Documentation  Taken 8/14/2024 0200 by Rachel Haddad RN  Head of Bed (HOB) Positioning: HOB at 30-45 degrees  Taken 8/14/2024 0000 by Rachel Haddad RN  Head of Bed (HOB) Positioning: HOB at 30-45 degrees  Taken 8/13/2024 2200 by Rachel Haddad RN  Head of Bed (HOB) Positioning: HOB at 30-45 degrees  Taken 8/13/2024 2000 by Rachel Haddad RN  Pressure Reduction Techniques:   heels elevated off bed   positioned off wounds   pressure points protected  Head of Bed (HOB) Positioning: HOB at 30-45 degrees  Pressure Reduction Devices:   heel offloading device utilized   positioning supports utilized  Skin Protection: adhesive use limited     Problem: Behavioral Health Comorbidity  Goal: Maintenance of Behavioral Health Symptom Control  8/14/2024 0332 by Rachel Haddad RN  Outcome: Ongoing, Not Progressing  8/14/2024 0332 by Rachel Haddad RN  Outcome: Ongoing, Progressing  Intervention: Maintain Behavioral Health Symptom Control  Recent Flowsheet Documentation  Taken 8/14/2024 0200 by Rachel Haddad RN  Medication Review/Management: medications reviewed  Taken 8/14/2024 0000 by Rachel Haddad RN  Medication Review/Management: medications reviewed  Taken 8/13/2024 2200 by Rachel Haddad RN  Medication Review/Management: medications reviewed  Taken 8/13/2024 2000 by Rachel Haddad RN  Medication Review/Management: medications reviewed     Problem: Diabetes  Comorbidity  Goal: Blood Glucose Level Within Targeted Range  8/14/2024 0332 by Rachel Haddad RN  Outcome: Ongoing, Not Progressing  8/14/2024 0332 by Rachel Haddad RN  Outcome: Ongoing, Progressing     Problem: Hypertension Comorbidity  Goal: Blood Pressure in Desired Range  8/14/2024 0332 by Rachel Haddad RN  Outcome: Ongoing, Not Progressing  8/14/2024 0332 by Rachel Haddad RN  Outcome: Ongoing, Progressing  Intervention: Maintain Blood Pressure Management  Recent Flowsheet Documentation  Taken 8/14/2024 0200 by Rachel Haddad RN  Medication Review/Management: medications reviewed  Taken 8/14/2024 0000 by Rachel Haddad RN  Medication Review/Management: medications reviewed  Taken 8/13/2024 2200 by Rachel Haddad RN  Medication Review/Management: medications reviewed  Taken 8/13/2024 2000 by Rachel Haddad RN  Medication Review/Management: medications reviewed     Problem: Adjustment to Illness (Sepsis/Septic Shock)  Goal: Optimal Coping  8/14/2024 0332 by Rachel Haddad RN  Outcome: Ongoing, Not Progressing  8/14/2024 0332 by Rachel Haddad RN  Outcome: Ongoing, Progressing     Problem: Bleeding (Sepsis/Septic Shock)  Goal: Absence of Bleeding  8/14/2024 0332 by Rachel Haddad RN  Outcome: Ongoing, Not Progressing  8/14/2024 0332 by Rachel Haddad RN  Outcome: Ongoing, Progressing     Problem: Glycemic Control Impaired (Sepsis/Septic Shock)  Goal: Blood Glucose Level Within Desired Range  8/14/2024 0332 by Rachel Haddad RN  Outcome: Ongoing, Not Progressing  8/14/2024 0332 by Rachel Haddad RN  Outcome: Ongoing, Progressing     Problem: Infection Progression (Sepsis/Septic Shock)  Goal: Absence of Infection Signs and Symptoms  8/14/2024 0332 by Rachel Haddad RN  Outcome: Ongoing, Not Progressing  8/14/2024 0332 by Rachel Haddad RN  Outcome: Ongoing, Progressing  Intervention: Promote Recovery  Recent Flowsheet  Documentation  Taken 8/14/2024 0200 by Rachel Haddad RN  Activity Management: bedrest  Taken 8/13/2024 2000 by Rachel Haddad RN  Activity Management: (aka pt unable to ambulate) bedrest     Problem: Nutrition Impaired (Sepsis/Septic Shock)  Goal: Optimal Nutrition Intake  8/14/2024 0332 by Rachel Haddad RN  Outcome: Ongoing, Not Progressing  8/14/2024 0332 by Rachel Haddad RN  Outcome: Ongoing, Progressing     Problem: Adjustment to Amputation (Lower Extremity Amputation)  Goal: Optimal Adjustment to Amputation  8/14/2024 0332 by Rachel Haddad RN  Outcome: Ongoing, Not Progressing  8/14/2024 0332 by Rachel Haddad RN  Outcome: Ongoing, Progressing     Problem: BADL (Basic Activities of Daily Living) Impairment (Lower Extremity Amputation)  Goal: Optimal Safe BADL Performance  8/14/2024 0332 by Rachel Haddad RN  Outcome: Ongoing, Not Progressing  8/14/2024 0332 by Rachel Haddad RN  Outcome: Ongoing, Progressing     Problem: IADL (Instrumental Activities of Daily Living) Impairment (Lower Extremity Amputation)  Goal: Optimal Safe IADL Performance  8/14/2024 0332 by Rachel Haddad RN  Outcome: Ongoing, Not Progressing  8/14/2024 0332 by Rachel Haddad RN  Outcome: Ongoing, Progressing     Problem: Lower Limb Care (Lower Extremity Amputation)  Goal: Effective Lower Limb Care  8/14/2024 0332 by Rachel Haddad RN  Outcome: Ongoing, Not Progressing  8/14/2024 0332 by Rachel Haddad RN  Outcome: Ongoing, Progressing     Problem: Mobility Impairment (Lower Extremity Amputation)  Goal: Optimal Mobility [place] and Safety  8/14/2024 0332 by Rachel Haddad RN  Outcome: Ongoing, Not Progressing  8/14/2024 0332 by Rachel Haddad RN  Outcome: Ongoing, Progressing     Problem: Pain and Hypersensitivity (Lower Extremity Amputation)  Goal: Acceptable Pain/Hypersensitivity Control  8/14/2024 0332 by Rachel Haddad RN  Outcome:  Ongoing, Not Progressing  8/14/2024 0332 by Rachel Haddad, RN  Outcome: Ongoing, Progressing   Goal Outcome Evaluation:

## 2024-08-14 NOTE — PROGRESS NOTES
[facility] HOSPITALIST PROGRESS NOTE     Patient Identification:  Name:  Reny Elena  Age:  64 y.o.  Sex:  female  :  1960  MRN:  [id number]  Visit Number:  [id number]  Primary Care Provider:  Susan Stephens APRN    Length of stay:  11    Subjective: Patient has been off pressors since yesterday, blood pressures improved, she is significantly anasarcic, she did develop transient unresponsiveness last night which CT of the brain was negative patient spontaneously woke up.  Patient did receive a dose of 0.5 Dilaudid last night as per 's request because the patient was in pain and not relieved by her Norco.  She is at the middle bleeding at the triple-lumen catheter site, fibrinogen is normal, INR was 2.1 repeat is 1.46 after receiving vitamin K.  Platelets also trending down now to 104.  Cultures so far is negative.    During my exam patient is awake but notable twitching noted, patient is mildly encephalopathic.    Patient did not receive dialysis yesterday.    Intake is still very poor, patient is cachectic and anasarcic with very low albumin.  Yesterday contemplated of placing Dobbhoff temporarily for feeding but because of her coagulopathy today we will hold off to minimize chance of bleeding, risk outweighs benefit.    Chief Complaint: Coagulopathy  ----------------------------------------------------------------------------------------------------------------------  Current Hospital Meds:  aspirin, 81 mg, Oral, Daily  [Held by provider] atorvastatin, 80 mg, Oral, Nightly  ferrous sulfate, 325 mg, Oral, Daily With Breakfast  FLUoxetine, 10 mg, Oral, Daily  folic acid, 1 mg, Oral, Daily  hydrocortisone sodium succinate, 50 mg, Intravenous, Q6H  insulin lispro, 2-7 Units, Subcutaneous, 4x Daily AC & at Bedtime  lacosamide, 50 mg, Oral, Q12H  levothyroxine, 100 mcg, Oral, QAM AC  midodrine, 7.5 mg, Oral, TID AC  mupirocin, 1 Application, Topical, Q12H  pantoprazole, 40 mg, Oral,  QAM AC  piperacillin-tazobactam, 3.375 g, Intravenous, Q12H  sodium chloride, 1,000 mL, Intravenous, Once  sodium chloride, 10 mL, Intravenous, Q12H  sodium chloride, 10 mL, Intravenous, Q12H  sodium chloride, 10 mL, Intravenous, Q12H  sodium chloride, 10 mL, Intravenous, Q12H  sodium chloride, 10 mL, Intravenous, Q12H  traZODone, 25 mg, Oral, Nightly  Vancomycin Pharmacy Intermittent/Pulse Dosing, , Does not apply, Daily      norepinephrine, 0.02-0.3 mcg/kg/min, Last Rate: Stopped (08/13/24 0322)  Pharmacy Consult - Pharmacy to dose,   Pharmacy Consult - Pharmacy to dose,   phenylephrine, 0.5-3 mcg/kg/min, Last Rate: Stopped (08/13/24 0519)  sodium chloride 0.45 % 1,000 mL with sodium bicarbonate 8.4 % 75 mEq infusion, , Last Rate: 75 mL/hr at 08/14/24 0800      ----------------------------------------------------------------------------------------------------------------------  Vital Signs:  Temp:  [96.6 °F (35.9 °C)-98 °F (36.7 °C)] 98 °F (36.7 °C)  Heart Rate:  [] 96  Resp:  [8-15] 12  BP: ()/() 136/96       Tele: Sinus rhythm 92 bpm      08/12/24  1525 08/13/24  0000 08/14/24  0630   Weight: 42.2 kg (93 lb 0.6 oz) 42.5 kg (93 lb 12.8 oz) 46.5 kg (102 lb 8.2 oz)     Body mass index is 17.59 kg/m².    Intake/Output Summary (Last 24 hours) at 8/14/2024 0921  Last data filed at 8/14/2024 0800  Gross per 24 hour   Intake 2657.5 ml   Output 400 ml   Net 2257.5 ml     Diet: Diabetic; Consistent Carbohydrate; Feeding Assistance - Nursing; Texture: Soft to Chew (NDD 3); Soft to Chew: Chopped Meat; Fluid Consistency: Thin (IDDSI 0)  ----------------------------------------------------------------------------------------------------------------------  Physical exam:  General: Awake somewhat slow when she responds, chronically ill-appearing, anasarcic.  No respiratory distress.    Skin:  Skin is warm and dry. No rash noted. No pallor.    HENT:  Head:  Normocephalic and atraumatic.  Mouth:  Moist mucous  membranes.    Eyes:  Conjunctivae and EOM are normal.  Pupils are equal, round, and reactive to light.  No scleral icterus.    Neck:  Neck supple.  No JVD present.    Pulmonary/Chest: Left subclavian vein, there is bleeding at the site.  No respiratory distress, no wheezes, no crackles, with normal breath sounds and good air movement.  Cardiovascular:  Normal rate, regular rhythm and normal heart sounds with no murmur.  Abdominal: Peritoneal catheter in place. soft.  Bowel sounds are normal.  No distension and no tenderness.   Extremities: Pitting edema generalized, multiple areas of superficial hematoma, no cyanosis, pedal pulses palpable  Neurological:  Motor strength equal no obvious deficit, above-knee amputations staples in place no drainage no bleeding.  Mildly lethargic.  Notable occasional twitching and tremors.    Genitourinary: No Doshi catheter  Back:  ----------------------------------------------------------------------------------------------------------------------  ----------------------------------------------------------------------------------------------------------------------  Results from last 7 days   Lab Units 08/11/24  0744   CK TOTAL U/L 90     Results from last 7 days   Lab Units 08/14/24  0702 08/14/24  0452 08/13/24  2329 08/13/24  2154 08/13/24  0616 08/13/24  0005 08/13/24  0004 08/12/24  1918 08/10/24  0528 08/09/24  0049   CRP mg/dL  --   --   --   --   --   --   --  1.65*  --   --    LACTATE mmol/L  --   --   --  1.5 2.5* 3.5*  --   --    < >  --    WBC 10*3/mm3 5.70  --  5.07  --   --   --  12.24*  --    < > 5.34   HEMOGLOBIN g/dL 9.2* 9.1* 7.1*  --   --   --  7.9*  --    < > 9.5*   HEMATOCRIT % 26.8* 27.4* 21.3*  --   --   --  23.9*  --    < > 31.6*   MCV fL 92.7  --  93.0  --   --   --  93.7  --    < > 102.3*   MCHC g/dL 34.3  --  33.3  --   --   --  33.1  --    < > 30.1*   PLATELETS 10*3/mm3 104*  --  101*  --   --   --  178  --    < > 149   INR  1.46*  --   --  2.13*  --   --   " --   --   --  0.96    < > = values in this interval not displayed.     Results from last 7 days   Lab Units 08/14/24  0503   PH, ARTERIAL pH units 7.411   PO2 ART mm Hg 72.0*   PCO2, ARTERIAL mm Hg 34.6*   HCO3 ART mmol/L 21.9     Results from last 7 days   Lab Units 08/14/24  0702 08/13/24  2154 08/13/24  0004 08/12/24  0312 08/11/24  1252 08/11/24  0744   SODIUM mmol/L 134* 134* 135* 136   < > 135*   POTASSIUM mmol/L 3.7 3.8 3.9 3.3*   < > 3.5   MAGNESIUM mg/dL  --   --   --  1.3*  --  1.3*   CHLORIDE mmol/L 100 102 99 104   < > 105   CO2 mmol/L 18.5* 17.9* 15.3* 15.9*   < > 18.9*   BUN mg/dL 21 20 19 18   < > 18   CREATININE mg/dL 3.03* 2.96* 2.88* 2.85*   < > 3.12*   CALCIUM mg/dL 6.8* 6.6* 6.5* 5.7*   < > 6.0*   GLUCOSE mg/dL 136* 140* 151* 229*   < > 62*   ALBUMIN g/dL 2.2* 2.0* 3.5 1.4*   < > 1.6*   BILIRUBIN mg/dL 0.9 0.5 0.7 0.3   < > 0.3   ALK PHOS U/L 433* 451* 323* 196*   < > 182*   AST (SGOT) U/L 1,095* 1,449* 3,281* 2,747*   < > 965*   ALT (SGPT) U/L 73* 98* 296* 322*   < > 147*    < > = values in this interval not displayed.   Estimated Creatinine Clearance: 13.4 mL/min (A) (by C-G formula based on SCr of 3.03 mg/dL (H)).    Ammonia   Date Value Ref Range Status   08/11/2024 22 11 - 51 umol/L Final         Blood Culture   Date Value Ref Range Status   08/12/2024 No growth at 24 hours  Preliminary     No results found for: \"URINECX\"  No results found for: \"WOUNDCX\"  No results found for: \"STOOLCX\"    I have personally looked at the labs and they are summarized above.  ----------------------------------------------------------------------------------------------------------------------  Imaging Results (Last 24 Hours)       Procedure Component Value Units Date/Time    CT Head Without Contrast [[id number]] Collected: 08/14/24 0059     Updated: 08/14/24 0101    Narrative:      Noncontrast CT brain     Indications: Altered mental status     Technique: Noncontrast CT images of the brain were obtained.  " Limited  exposure control, adjustment of the MA and/or KV according to patient  size or use of an iterative reconstruction technique was utilized.     Findings CT brain:     Comparison is made to the prior study dated 4/28/2024.     Examination is somewhat limited by patient positioning.  Axial images  are not true brain axial images.  Allowing for this, there is no  evidence of acute intercranial hemorrhage.  The ventricles are  prominent, stable from the prior study.  Patchy and confluent areas of  hypodensity involve the periventricular deep white matter compatible  sequelae of chronic small vessel ischemic changes.  There is no  mass-effect or midline shift.  No abnormal extra-axial fluid collections  are demonstrated.       Impression:      Impression:     No CT evidence of an acute intracranial process.     This report was finalized on 8/14/2024 12:59 AM by Chin Chowdhury MD.       CT Chest Without Contrast Diagnostic [[id number]] Collected: 08/13/24 1955     Updated: 08/13/24 2010    Narrative:      Procedure: Contiguous axial images were obtained through the chest  abdomen and pelvis without intravenous contrast.  The use of sagittal  coronal reformations are supplied.     Comparison: CT abdomen 8/22/2024, chest 4/28/2024     Findings     CHEST: Chest is well-expanded with diffuse groundglass attenuation  throughout all lobes.     A small left pleural effusion is present with airspace consolidation.     Trachea and mainstem bronchi are patent.     Central venous catheter present with distal tip at the atrial caval  junction.  Heart size within normal limits.     No adenopathy in the chest.     Cachexia and anasarca noted.     In bone windows, sternum, thoracic vertebral bodies and ribs are intact.   No pneumothorax.     ABDOMEN/PELVIS: Moderate ascites present.     Diffuse anasarca noted.  A catheter enters the mid abdomen, to the left  of midline with distal tip coiled in the left mid abdomen.     There  may be fluid surrounding the gallbladder which is not  well-defined.     No extraluminal gas.  Urinary bladder distends normally.  A large amount  of free fluid in the pelvis.  Uterus is not identified.  A small amount  of formed stool present in the colon.  A discrete appendix is not  identified.     The noncontrast enhanced liver, spleen, stomach and adrenals are  morphologically unremarkable.  Kidneys are atrophic with calcified  vascular structures.     No dilated loops of bowel or bowel obstruction.     Above-the-knee amputation noted on the  view.     Moderate degenerative change at L5-S1.       Impression:         1.  Small to moderate left pleural effusion with adjacent airspace  consolidation suggesting atelectasis or pneumonia.     2.  Diffuse groundglass attenuation of the lungs compatible with  alveolitis.     3.   Mildly distended gallbladder with indistinct appearance.  If acute  cholecystitis is of clinical concern, right upper quadrant ultrasound  could be considered.     4.  Anasarca              This report was finalized on 8/13/2024 8:08 PM by Regina Magaña MD.       CT Abdomen Pelvis Without Contrast [[id number]] Collected: 08/13/24 1955     Updated: 08/13/24 2010    Narrative:      Procedure: Contiguous axial images were obtained through the chest  abdomen and pelvis without intravenous contrast.  The use of sagittal  coronal reformations are supplied.     Comparison: CT abdomen 8/22/2024, chest 4/28/2024     Findings     CHEST: Chest is well-expanded with diffuse groundglass attenuation  throughout all lobes.     A small left pleural effusion is present with airspace consolidation.     Trachea and mainstem bronchi are patent.     Central venous catheter present with distal tip at the atrial caval  junction.  Heart size within normal limits.     No adenopathy in the chest.     Cachexia and anasarca noted.     In bone windows, sternum, thoracic vertebral bodies and ribs are intact.   No  pneumothorax.     ABDOMEN/PELVIS: Moderate ascites present.     Diffuse anasarca noted.  A catheter enters the mid abdomen, to the left  of midline with distal tip coiled in the left mid abdomen.     There may be fluid surrounding the gallbladder which is not  well-defined.     No extraluminal gas.  Urinary bladder distends normally.  A large amount  of free fluid in the pelvis.  Uterus is not identified.  A small amount  of formed stool present in the colon.  A discrete appendix is not  identified.     The noncontrast enhanced liver, spleen, stomach and adrenals are  morphologically unremarkable.  Kidneys are atrophic with calcified  vascular structures.     No dilated loops of bowel or bowel obstruction.     Above-the-knee amputation noted on the  view.     Moderate degenerative change at L5-S1.       Impression:         1.  Small to moderate left pleural effusion with adjacent airspace  consolidation suggesting atelectasis or pneumonia.     2.  Diffuse groundglass attenuation of the lungs compatible with  alveolitis.     3.   Mildly distended gallbladder with indistinct appearance.  If acute  cholecystitis is of clinical concern, right upper quadrant ultrasound  could be considered.     4.  Anasarca              This report was finalized on 8/13/2024 8:08 PM by Regina Magaña MD.             ----------------------------------------------------------------------------------------------------------------------  Assessment and Plan:  -Shock,?  From sepsis, hypovolemia  -Coagulopathy, etiology ?  Rule out early DIC, vitamin K deficiency, HIT . 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  Ischemic hepatitis from shock  -Acute blood loss anemia postsurgical, 1 unit packed RBC will be transfused      Sepsis workup so far nonyielding, patient  empirically on vancomycin and Zosyn, will discuss with infectious disease.  Help appreciated.    Patient is receiving vitamin K, 2 units FFP.  Discussed with nephrology for PD, transfuse with packed RBC as needed, check HIT, pressor support if needed.    With regards to plans of placing NG tube for tube feeding, at this time, because of coagulopathy we will hold off due to increased risk of bleeding, will consider TPN as discussed with critical care service.    Discussed with patient and agreed.  Will update .  Patient is guarded.    Total time spent on this encounter is 40 minutes.    Disposition: Pending      Rebeka Woodruff MD  08/14/24  09:21 EDT

## 2024-08-14 NOTE — PROGRESS NOTES
Nephrology Progress Note      Subjective   Patient has placed internal jugular central line that has been bleeding.  Patient is more awake and alert.  She denied any chest pain or shortness of breath.  RN at bedside stated her oral intake is extremely poor.    Objective       Vital signs :     Temp:  [96.6 °F (35.9 °C)-97.8 °F (36.6 °C)] 97.8 °F (36.6 °C)  Heart Rate:  [] 96  Resp:  [8-15] 11  BP: ()/() 107/42    Intake/Output                               08/12/24 0701 - 08/13/24 0700 08/13/24 0701 - 08/14/24 0700 08/14/24 0701 - 08/15/24 0700     4061-2837 0680-5987 Total 9408-4063 1577-3647 Total 4090-8914 0763-1742 Total                    Intake    P.O.  300  -- 300  50  -- 50  --  -- --    I.V.  925.4  880.9 1806.3  1036.3  971.3 2007.5  --  -- --    Blood  --  -- --  --  300 300  --  -- --    Volume (Transfuse RBC Infuse Each Unit Over: 3.5H) -- -- -- -- 300 300 -- -- --    IV Piggyback  100  450 550  100  100 200  100  -- 100    Total Intake 1325.4 1330.9 2656.3 1186.3 1371.3 2557.5 100 -- 100       Output    Urine  --  -- --  --  400 400  --  -- --    Dialysis  581  -- 581  --  -- --  --  -- --    Total Output 581 -- 581 -- 400 400 -- -- --             Physical Exam:    General Appearance : alert, pleasant, appears stated age, cooperative and oriented  Lungs : Bilateral decreased intensity of breath sounds, bibasilar crackles  Heart :  regular rhythm & normal rate, normal S1, S2 and no murmur, no rub  Abdomen : Soft, nondistended PD catheter site clear  Extremities : 1+ to trace edema, right AKA with dressing  Neurologic :   orientated to person, place, time and situation, Grossly no focal deficits        Laboratory Data :     Albumin Albumin   Date Value Ref Range Status   08/14/2024 2.2 (L) 3.5 - 5.2 g/dL Final   08/13/2024 2.0 (L) 3.5 - 5.2 g/dL Final   08/13/2024 3.5 3.5 - 5.2 g/dL Final   08/12/2024 1.4 (L) 3.5 - 5.2 g/dL Final   08/11/2024 1.6 (L) 3.5 - 5.2 g/dL Final       "  Magnesium Magnesium   Date Value Ref Range Status   08/12/2024 1.3 (L) 1.6 - 2.4 mg/dL Final          PTH               No results found for: \"PTH\"    CBC and coagulation:  Results from last 7 days   Lab Units 08/14/24  0702 08/14/24  0452 08/13/24  2329 08/13/24  2154 08/13/24  0616 08/13/24  0005 08/13/24  0004 08/12/24  1918 08/10/24  0528 08/09/24  0049   PROCALCITONIN ng/mL  --   --   --   --   --   --   --  74.29*  --   --    LACTATE mmol/L  --   --   --  1.5 2.5* 3.5*  --   --    < >  --    CRP mg/dL  --   --   --   --   --   --   --  1.65*  --   --    WBC 10*3/mm3 5.70  --  5.07  --   --   --  12.24*  --    < > 5.34   HEMOGLOBIN g/dL 9.2* 9.1* 7.1*  --   --   --  7.9*  --    < > 9.5*   HEMATOCRIT % 26.8* 27.4* 21.3*  --   --   --  23.9*  --    < > 31.6*   MCV fL 92.7  --  93.0  --   --   --  93.7  --    < > 102.3*   MCHC g/dL 34.3  --  33.3  --   --   --  33.1  --    < > 30.1*   PLATELETS 10*3/mm3 104*  --  101*  --   --   --  178  --    < > 149   INR  1.46*  --   --  2.13*  --   --   --   --   --  0.96    < > = values in this interval not displayed.     Acid/base balance:  Results from last 7 days   Lab Units 08/14/24  0503 08/13/24 2217 08/11/24  1214   PH, ARTERIAL pH units 7.411 7.399 7.423   PO2 ART mm Hg 72.0* 77.2* 106.0   PCO2, ARTERIAL mm Hg 34.6* 36.0 28.7*   HCO3 ART mmol/L 21.9 22.2 18.7*     Renal and electrolytes:    Results from last 7 days   Lab Units 08/14/24  0702 08/13/24  2154 08/13/24  0745 08/13/24  0004 08/12/24  0312 08/11/24  1252 08/11/24  0744   SODIUM mmol/L 134* 134*  --  135* 136 132* 135*   POTASSIUM mmol/L 3.7 3.8  --  3.9 3.3* 3.7 3.5   MAGNESIUM mg/dL  --   --   --   --  1.3*  --  1.3*   CHLORIDE mmol/L 100 102  --  99 104 100 105   CO2 mmol/L 18.5* 17.9*  --  15.3* 15.9* 18.4* 18.9*   BUN mg/dL 21 20  --  19 18 21 18   CREATININE mg/dL 3.03* 2.96*  --  2.88* 2.85* 3.22* 3.12*   CALCIUM mg/dL 6.8* 6.6*  --  6.5* 5.7* 6.0* 6.0*   IONIZED CALCIUM mmol/L  --   --  0.74*  --  "  --   --   --      Estimated Creatinine Clearance: 13.4 mL/min (A) (by C-G formula based on SCr of 3.03 mg/dL (H)).  @GFRCG:3@   Liver and pancreatic function:  Results from last 7 days   Lab Units 08/14/24 0702 08/13/24 2154 08/13/24 0004 08/12/24 0312 08/11/24  1714   ALBUMIN g/dL 2.2* 2.0* 3.5   < >  --    BILIRUBIN mg/dL 0.9 0.5 0.7   < >  --    ALK PHOS U/L 433* 451* 323*   < >  --    AST (SGOT) U/L 1,095* 1,449* 3,281*   < >  --    ALT (SGPT) U/L 73* 98* 296*   < >  --    AMMONIA umol/L  --   --   --   --  22    < > = values in this interval not displayed.         Cardiac:      Liver and pancreatic function:  Results from last 7 days   Lab Units 08/14/24 0702 08/13/24 2154 08/13/24 0004 08/12/24 0312 08/11/24  1714   ALBUMIN g/dL 2.2* 2.0* 3.5   < >  --    BILIRUBIN mg/dL 0.9 0.5 0.7   < >  --    ALK PHOS U/L 433* 451* 323*   < >  --    AST (SGOT) U/L 1,095* 1,449* 3,281*   < >  --    ALT (SGPT) U/L 73* 98* 296*   < >  --    AMMONIA umol/L  --   --   --   --  22    < > = values in this interval not displayed.       Medications :     aspirin, 81 mg, Oral, Daily  [Held by provider] atorvastatin, 80 mg, Oral, Nightly  ferrous sulfate, 325 mg, Oral, Daily With Breakfast  FLUoxetine, 10 mg, Oral, Daily  folic acid, 1 mg, Oral, Daily  hydrocortisone sodium succinate, 50 mg, Intravenous, Q6H  insulin lispro, 2-7 Units, Subcutaneous, 4x Daily AC & at Bedtime  lacosamide, 50 mg, Oral, Q12H  levothyroxine, 100 mcg, Oral, QAM AC  midodrine, 7.5 mg, Oral, TID AC  mupirocin, 1 Application, Topical, Q12H  pantoprazole, 40 mg, Oral, QAM AC  piperacillin-tazobactam, 3.375 g, Intravenous, Q12H  sodium chloride, 1,000 mL, Intravenous, Once  sodium chloride, 10 mL, Intravenous, Q12H  sodium chloride, 10 mL, Intravenous, Q12H  sodium chloride, 10 mL, Intravenous, Q12H  sodium chloride, 10 mL, Intravenous, Q12H  sodium chloride, 10 mL, Intravenous, Q12H  traZODone, 25 mg, Oral, Nightly  Vancomycin Pharmacy  Intermittent/Pulse Dosing, , Does not apply, Daily      norepinephrine, 0.02-0.3 mcg/kg/min, Last Rate: Stopped (08/13/24 0322)  Pharmacy Consult - Pharmacy to dose,   Pharmacy Consult - Pharmacy to dose,   phenylephrine, 0.5-3 mcg/kg/min, Last Rate: Stopped (08/13/24 0519)  sodium chloride 0.45 % 1,000 mL with sodium bicarbonate 8.4 % 75 mEq infusion, , Last Rate: 75 mL/hr at 08/14/24 0800            Assessment & Plan       -End-stage renal disease on peritoneal dialysis  -Hypotension  -Anemia  -Hypokalemia  -Type 2 diabetes mellitus with renal involvement  - Persistent nausea and vomiting  - Cachexia and malnutrition    Counseled the patient for nutrition, her poor oral intake and starting on enteral tube feed.  She was agreeable to putting in nasogastric tube and start on feeding.  Will start the patient on Nepro at 15 mL/h for 12 hours and then reach to goal    Will resume peritoneal dialysis tonight    Hypokalemia, better continue on oral potassium replacement    Metabolic acidosis, persistent continue on bicarb fluid    Discussed with Dr. Javier    Discussed with RN    Please avoid nephrotoxic medication and pharmacy to adjust the medication according to the GFR.      Plan of care was discussed with the patient. Patient understood, verbalized, agreed.      Nicholas Arroyo MD  08/14/24  09:14 EDT

## 2024-08-14 NOTE — CONSULTS
Palliative Care Initial Consult     Attending Physician: Rebeka Woodruff MD  Referring Provider: Ashu Woodruff MD    assistance with advance directives, assistance with clarification of goals of care, and psychosocial support  Code Status:   Code Status and Medical Interventions: No CPR (Do Not Attempt to Resuscitate); Limited Support; No intubation (DNI)   Ordered at: 08/04/24 1459     Medical Intervention Limits:    No intubation (DNI)     Code Status (Patient has no pulse and is not breathing):    No CPR (Do Not Attempt to Resuscitate)     Medical Interventions (Patient has pulse or is breathing):    Limited Support      Advanced Directives: Advance Directive Status: Patient has advance directive, copy in chart   Healthcare surrogate: PRESLEY Cliff Abbie significant other  Goals of Care: I was able to speak with Cliff today at bedside to discuss GOC/ACP as Reny is not able herself at this time. Cliff confirmed that Reny is a DNR/DNI. We discussed Reny's condition, quality of life and that Cliff would like to get her back home with him if she was able. I told Cliff that we would be her e for support for Reny and him.    HPI:  Reny Elena is a 64 y.o. female admitted on 8/2/2024 due to nausea, vomiting, with right foot pain and fever for a week before presenting to the ER. Reny has a medical history of arthritis, insulin dependent type II DM, chronic diastolic CHF, hypothyroidism, seizure disorder, HTN, HLD, GERD, iron deficiency anemia, PAD, and ESRD on PD,   Reny was given an antibiotic at prior ER visit on 7/21/24 due to a UTI and she stated after a couple of days of antibiotic she began to experience nausea and vomiting, apparently she stopped taking the antibiotic and symptoms continued to persist. This visit in the ED Reny did complain of increased rt foot pain. Reny was tachycardic. 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 Pulmonary,  Infectious disease, and Nephrology are all consulted on Ms Elena.      ROS: Negative except as above in HPI.     Past Medical History:   Diagnosis Date    Arthritis     Closed fracture of right fibula and tibia 2018    Depression     Diabetic ulcer of left foot associated with type 2 diabetes mellitus 2017    Diastolic dysfunction     Disease of thyroid gland     Elevated cholesterol     End stage renal disease     Essential hypertension     GERD (gastroesophageal reflux disease)     History of transfusion     Hyperlipidemia     Iron deficiency anemia 2018    PAD (peripheral artery disease)     Renal failure     Type 2 diabetes mellitus with hyperglycemia, with long-term current use of insulin 2018     Past Surgical History:   Procedure Laterality Date    ABDOMINAL SURGERY      ABOVE KNEE AMPUTATION Right 2024    Procedure: AMPUTATION ABOVE KNEE;  Surgeon: Angelo Santoro MD;  Location: [facility] OR;  Service: General;  Laterality: Right;    BACK SURGERY      Post spinal diskectomy, osteophytectomy in one lumbar interspace    CATARACT EXTRACTION      Left      SECTION      DENTAL PROCEDURE      ENDOSCOPY      FRACTURE SURGERY      right leg    HYSTERECTOMY      INCISION AND DRAINAGE LEG Left 4/15/2017    Procedure: INCISION AND DRAINAGE LOWER EXTREMITY;  Surgeon: Basilio Morris MD;  Location: [facility] OR;  Service:     INCISION AND DRAINAGE LEG Left 2017    Procedure: Irrigation and Debridment abcess diabetic wound left foot with deep culture;  Surgeon: Basilio Morris MD;  Location: [facility] OR;  Service:     INSERTION PERITONEAL DIALYSIS CATHETER N/A 2021    Procedure: INSERTION PERITONEAL DIALYSIS CATHETER LAPAROSCOPIC;  Surgeon: Edy Glover MD;  Location: [facility] OR;  Service: General;  Laterality: N/A;    KNEE ARTHROSCOPY Right     ORIF TIBIA FRACTURE Right 2018    Procedure: TIBIAL  FRACTURE OPEN REDUCTION INTERNAL FIXATION;  Surgeon: Jung Barragan MD;   Location: [facility] OR;  Service: Orthopedics    TOE AMPUTATION Right     5th    TUBAL ABDOMINAL LIGATION       Social History     Socioeconomic History    Marital status:    Tobacco Use    Smoking status: Never     Passive exposure: Never    Smokeless tobacco: Never   Vaping Use    Vaping status: Never Used   Substance and Sexual Activity    Alcohol use: No    Drug use: No    Sexual activity: Defer     Partners: Male     Family History   Problem Relation Age of Onset    Heart disease Mother     Cancer Mother     COPD Mother     Diabetes Other     Depression Other        Allergies   Allergen Reactions    Morphine Nausea And Vomiting    Penicillins Hives and GI Intolerance             Codeine Hives, Rash and GI Intolerance     Pt tolerates [place] @ home    Sulfa Antibiotics Hives, Rash and GI Intolerance     NAUSEA TOO       Current Facility-Administered Medications   Medication Dose Route Frequency Provider Last Rate Last Admin    aspirin EC tablet 81 mg  81 mg Oral Daily Rebeka Woodruff MD   81 mg at 08/14/24 0800    [Held by provider] atorvastatin (LIPITOR) tablet 80 mg  80 mg Oral Nightly MohsenemaAngelo garcia MD   80 mg at 08/10/24 2034    sennosides-docusate (PERICOLACE) 8.6-50 MG per tablet 2 tablet  2 tablet Oral BID PRN Rebeka Woodruff MD        And    polyethylene glycol (MIRALAX) packet 17 g  17 g Oral Daily PRN Rebeka Woodruff MD        And    bisacodyl (DULCOLAX) EC tablet 5 mg  5 mg Oral Daily PRN Rebeka Woodruff MD        And    bisacodyl (DULCOLAX) suppository 10 mg  10 mg Rectal Daily PRN Rebeka Woodruff MD        Calcium Replacement - Follow Nurse / BPA Driven Protocol   Does not apply PRN Rebeka Woodruff MD        dextrose (D50W) (25 g/50 mL) IV injection 25 g  25 g Intravenous Q15 Min PRN Rebeka Woodruff MD   25 g at 08/12/24 1740    dextrose (D50W) (25 g/50 mL) IV injection 25 g  25 g Intravenous Q15 Min PRN Rebeka Woodruff MD        dextrose (GLUTOSE)  oral gel 15 g  15 g Oral Q15 Min PRN Rebeka Woodruff MD        ferrous sulfate tablet 325 mg  325 mg Oral Daily With Breakfast Rebeka Woodruff MD   325 mg at 08/14/24 0759    FLUoxetine (PROzac) capsule 10 mg  10 mg Oral Daily Rebeka Woodruff MD   10 mg at 08/14/24 0800    fluticasone (FLONASE) 50 MCG/ACT nasal spray 2 spray  2 spray Nasal Daily PRN Rebeka Woodruff MD        folic acid (FOLVITE) tablet 1 mg  1 mg Oral Daily Rebeka Woodruff MD   1 mg at 08/14/24 0800    glucagon HCl (Diagnostic) injection 1 mg  1 mg Intramuscular Q15 Min PRN Rebeka Woodruff MD        Hydrocortisone Sod Suc (PF) (Solu-CORTEF) injection 50 mg  50 mg Intravenous Q6H Johnson Rider MD   50 mg at 08/14/24 1359    Insulin Lispro (humaLOG) injection 2-7 Units  2-7 Units Subcutaneous 4x Daily AC & at Bedtime Rebeka Woodruff MD   2 Units at 08/14/24 0801    ipratropium (ATROVENT) nebulizer solution 0.5 mg  0.5 mg Nebulization Q6H PRN Rebeka Woodruff MD        lacosamide (VIMPAT) tablet 50 mg  50 mg Oral Q12H Rebeka Woodruff MD   50 mg at 08/14/24 0800    levothyroxine (SYNTHROID, LEVOTHROID) tablet 100 mcg  100 mcg Oral QAM AC Rebeka Woodruff MD   100 mcg at 08/14/24 0759    Lidocaine 4 % 1 patch  1 patch Transdermal Daily PRN Rebeka Woodruff MD        Magnesium Standard Dose Replacement - Follow Nurse / BPA Driven Protocol   Does not apply PRN Rebeka Woodruff MD        midodrine (PROAMATINE) tablet 7.5 mg  7.5 mg Oral TID AC Rebeka Woodruff MD   7.5 mg at 08/12/24 1735    mupirocin (BACTROBAN) 2 % ointment 1 Application  1 Application Topical Q12H Rebeka Woodruff MD   1 Application at 08/14/24 0957    nitroglycerin (NITROSTAT) SL tablet 0.4 mg  0.4 mg Sublingual Q5 Min PRN Rebeka Woodruff MD        norepinephrine (LEVOPHED) 8 mg in 250 mL NS infusion (premix)  0.02-0.3 mcg/kg/min Intravenous Titrated Rebeka Woodruff MD   Stopped at 08/13/24 1065    oxyCODONE  (ROXICODONE) immediate release tablet 5 mg  5 mg Oral Q4H PRN Jennifer Sanders APRN   5 mg at 08/14/24 1205    pantoprazole (PROTONIX) EC tablet 40 mg  40 mg Oral QAM AC Rebeka Woodruff MD   40 mg at 08/14/24 0759    Pharmacy Consult - Pharmacy to dose   Does not apply Continuous Rebeka Woodruff MD        Pharmacy Consult - Pharmacy to dose   Does not apply Continuous Rebeka Woodruff MD        phenylephrine (KAILA-SYNEPHRINE) 50 mg in 250 mL NS infusion  0.5-3 mcg/kg/min Intravenous Titrated Rebeka Woodruff MD   Stopped at 08/13/24 0519    Phosphorus Replacement - Follow Nurse / BPA Driven Protocol   Does not apply PRN Rebeka Woodruff MD        piperacillin-tazobactam (ZOSYN) IVPB 3.375 g IVPB in 100 mL NS (VTB)  3.375 g Intravenous Q12H Rebeka Woodruff MD   3.375 g at 08/14/24 0801    Potassium Replacement - Follow Nurse / BPA Driven Protocol   Does not apply PRN Rebeka Woodruff MD        sodium chloride 0.45 % 1,000 mL with sodium bicarbonate 8.4 % 75 mEq infusion   Intravenous Continuous Rebeka Woodruff MD 75 mL/hr at 08/14/24 0800 New Bag at 08/14/24 0800    sodium chloride 0.9 % bolus 1,000 mL  1,000 mL Intravenous Once Rebeka Woodruff MD 2,000 mL/hr at 08/12/24 0315 Currently Infusing at 08/12/24 0315    sodium chloride 0.9 % flush 10 mL  10 mL Intravenous PRN Rebeka Woodruff MD        sodium chloride 0.9 % flush 10 mL  10 mL Intravenous Q12H Rebeka Woodruff MD   10 mL at 08/14/24 0957    sodium chloride 0.9 % flush 10 mL  10 mL Intravenous PRN Rebeka Woodruff MD        sodium chloride 0.9 % flush 10 mL  10 mL Intravenous Q12H Rebeka Woodruff MD   10 mL at 08/14/24 0957    sodium chloride 0.9 % flush 10 mL  10 mL Intravenous Q12H Rebeka Woodruff MD   10 mL at 08/14/24 0957    sodium chloride 0.9 % flush 10 mL  10 mL Intravenous Q12H Rebeka Woodruff MD   10 mL at 08/14/24 0957    sodium chloride 0.9 % flush 10 mL  10 mL Intravenous PRN  Rebeka Woodruff MD        sodium chloride 0.9 % flush 10 mL  10 mL Intravenous Q12H Rebeka Woodruff MD   10 mL at 08/14/24 0957    sodium chloride 0.9 % flush 10 mL  10 mL Intravenous PRN Rebeka Woodruff MD        sodium chloride 0.9 % flush 20 mL  20 mL Intravenous PRN Rebeka Woodruff MD        sodium chloride 0.9 % infusion 40 mL  40 mL Intravenous PRN Rebeka Woodruff MD        sodium chloride 0.9 % infusion 40 mL  40 mL Intravenous PRN Rebeka Woodruff MD        sodium chloride 0.9 % infusion 40 mL  40 mL Intravenous PRN Rebeka Woodruff MD        traZODone (DESYREL) tablet 25 mg  25 mg Oral Nightly Rebeka Woodruff MD   25 mg at 08/12/24 2013    Vancomycin Pharmacy Intermittent/Pulse Dosing   Does not apply Daily Rebeka Woodruff MD         norepinephrine, 0.02-0.3 mcg/kg/min, Last Rate: Stopped (08/13/24 0322)  Pharmacy Consult - Pharmacy to dose,   Pharmacy Consult - Pharmacy to dose,   phenylephrine, 0.5-3 mcg/kg/min, Last Rate: Stopped (08/13/24 0519)  sodium chloride 0.45 % 1,000 mL with sodium bicarbonate 8.4 % 75 mEq infusion, , Last Rate: 75 mL/hr at 08/14/24 0800        senna-docusate sodium **AND** polyethylene glycol **AND** bisacodyl **AND** bisacodyl    Calcium Replacement - Follow Nurse / BPA Driven Protocol    dextrose    dextrose    dextrose    fluticasone    glucagon (human recombinant)    ipratropium    Lidocaine    Magnesium Standard Dose Replacement - Follow Nurse / BPA Driven Protocol    nitroglycerin    oxyCODONE    Phosphorus Replacement - Follow Nurse / BPA Driven Protocol    Potassium Replacement - Follow Nurse / BPA Driven Protocol    sodium chloride    sodium chloride    sodium chloride    sodium chloride    sodium chloride    sodium chloride    sodium chloride    sodium chloride    Current medication reviewed for route, type, dose and frequency and are current per MAR.    Palliative Performance Scale Score:     /96   Pulse 92   Temp 98  "°F (36.7 °C) (Oral)   Resp 12   Ht 162.6 cm (64.02\")   Wt 46.5 kg (102 lb 8.2 oz)   LMP 05/26/2005   SpO2 98%   BMI 17.59 kg/m²     Intake/Output Summary (Last 24 hours) at 8/14/2024 1519  Last data filed at 8/14/2024 1358  Gross per 24 hour   Intake 3492.5 ml   Output 400 ml   Net 3092.5 ml       PE:  General Appearance:    Chronically ill appearing, alert, slow to respond.cooperative, NAD   HEENT:    NC/AT, without obvious abnormality, EOMI, anicteric    Neck:   supple, trachea midline, no JVD   Lungs:     Unlabored respirations, no wheezing rhonchi or rales noted.    Heart:    RRR, normal S1 and S2, no M/R/G   Abdomen:     Soft, NT, ND, NABS    Extremities:   RT Aka with kerlex and ace bandage intact, edema in left lower extremity   Pulses:   Pulses palpable and equal bilaterally   Skin:   Warm, dry   Neurologic:   Awake and alert to self only, slow to respond to questions, with sometimes appropriate answers.   Psych:   Calm, flat       Labs:   Results from last 7 days   Lab Units 08/14/24  0702   WBC 10*3/mm3 5.70   HEMOGLOBIN g/dL 9.2*   HEMATOCRIT % 26.8*   PLATELETS 10*3/mm3 104*     Results from last 7 days   Lab Units 08/14/24  0702   SODIUM mmol/L 134*   POTASSIUM mmol/L 3.7   CHLORIDE mmol/L 100   CO2 mmol/L 18.5*   BUN mg/dL 21   CREATININE mg/dL 3.03*   GLUCOSE mg/dL 136*   CALCIUM mg/dL 6.8*     Results from last 7 days   Lab Units 08/14/24  0702   SODIUM mmol/L 134*   POTASSIUM mmol/L 3.7   CHLORIDE mmol/L 100   CO2 mmol/L 18.5*   BUN mg/dL 21   CREATININE mg/dL 3.03*   CALCIUM mg/dL 6.8*   BILIRUBIN mg/dL 0.9   ALK PHOS U/L 433*   ALT (SGPT) U/L 73*   AST (SGOT) U/L 1,095*   GLUCOSE mg/dL 136*     Imaging Results (Last 72 Hours)       Procedure Component Value Units Date/Time    CT Head Without Contrast [[id number]] Collected: 08/14/24 0059     Updated: 08/14/24 0101    Narrative:      Noncontrast CT brain     Indications: Altered mental status     Technique: Noncontrast CT images of the brain " were obtained.  Limited  exposure control, adjustment of the MA and/or KV according to patient  size or use of an iterative reconstruction technique was utilized.     Findings CT brain:     Comparison is made to the prior study dated 4/28/2024.     Examination is somewhat limited by patient positioning.  Axial images  are not true brain axial images.  Allowing for this, there is no  evidence of acute intercranial hemorrhage.  The ventricles are  prominent, stable from the prior study.  Patchy and confluent areas of  hypodensity involve the periventricular deep white matter compatible  sequelae of chronic small vessel ischemic changes.  There is no  mass-effect or midline shift.  No abnormal extra-axial fluid collections  are demonstrated.       Impression:      Impression:     No CT evidence of an acute intracranial process.     This report was finalized on 8/14/2024 12:59 AM by Chin Chowdhury MD.       CT Chest Without Contrast Diagnostic [[id number]] Collected: 08/13/24 1955     Updated: 08/13/24 2010    Narrative:      Procedure: Contiguous axial images were obtained through the chest  abdomen and pelvis without intravenous contrast.  The use of sagittal  coronal reformations are supplied.     Comparison: CT abdomen 8/22/2024, chest 4/28/2024     Findings     CHEST: Chest is well-expanded with diffuse groundglass attenuation  throughout all lobes.     A small left pleural effusion is present with airspace consolidation.     Trachea and mainstem bronchi are patent.     Central venous catheter present with distal tip at the atrial caval  junction.  Heart size within normal limits.     No adenopathy in the chest.     Cachexia and anasarca noted.     In bone windows, sternum, thoracic vertebral bodies and ribs are intact.   No pneumothorax.     ABDOMEN/PELVIS: Moderate ascites present.     Diffuse anasarca noted.  A catheter enters the mid abdomen, to the left  of midline with distal tip coiled in the left mid  abdomen.     There may be fluid surrounding the gallbladder which is not  well-defined.     No extraluminal gas.  Urinary bladder distends normally.  A large amount  of free fluid in the pelvis.  Uterus is not identified.  A small amount  of formed stool present in the colon.  A discrete appendix is not  identified.     The noncontrast enhanced liver, spleen, stomach and adrenals are  morphologically unremarkable.  Kidneys are atrophic with calcified  vascular structures.     No dilated loops of bowel or bowel obstruction.     Above-the-knee amputation noted on the  view.     Moderate degenerative change at L5-S1.       Impression:         1.  Small to moderate left pleural effusion with adjacent airspace  consolidation suggesting atelectasis or pneumonia.     2.  Diffuse groundglass attenuation of the lungs compatible with  alveolitis.     3.   Mildly distended gallbladder with indistinct appearance.  If acute  cholecystitis is of clinical concern, right upper quadrant ultrasound  could be considered.     4.  Anasarca              This report was finalized on 8/13/2024 8:08 PM by Regina Magaña MD.       CT Abdomen Pelvis Without Contrast [[id number]] Collected: 08/13/24 1955     Updated: 08/13/24 2010    Narrative:      Procedure: Contiguous axial images were obtained through the chest  abdomen and pelvis without intravenous contrast.  The use of sagittal  coronal reformations are supplied.     Comparison: CT abdomen 8/22/2024, chest 4/28/2024     Findings     CHEST: Chest is well-expanded with diffuse groundglass attenuation  throughout all lobes.     A small left pleural effusion is present with airspace consolidation.     Trachea and mainstem bronchi are patent.     Central venous catheter present with distal tip at the atrial caval  junction.  Heart size within normal limits.     No adenopathy in the chest.     Cachexia and anasarca noted.     In bone windows, sternum, thoracic vertebral bodies and ribs  are intact.   No pneumothorax.     ABDOMEN/PELVIS: Moderate ascites present.     Diffuse anasarca noted.  A catheter enters the mid abdomen, to the left  of midline with distal tip coiled in the left mid abdomen.     There may be fluid surrounding the gallbladder which is not  well-defined.     No extraluminal gas.  Urinary bladder distends normally.  A large amount  of free fluid in the pelvis.  Uterus is not identified.  A small amount  of formed stool present in the colon.  A discrete appendix is not  identified.     The noncontrast enhanced liver, spleen, stomach and adrenals are  morphologically unremarkable.  Kidneys are atrophic with calcified  vascular structures.     No dilated loops of bowel or bowel obstruction.     Above-the-knee amputation noted on the  view.     Moderate degenerative change at L5-S1.       Impression:         1.  Small to moderate left pleural effusion with adjacent airspace  consolidation suggesting atelectasis or pneumonia.     2.  Diffuse groundglass attenuation of the lungs compatible with  alveolitis.     3.   Mildly distended gallbladder with indistinct appearance.  If acute  cholecystitis is of clinical concern, right upper quadrant ultrasound  could be considered.     4.  Anasarca              This report was finalized on 8/13/2024 8:08 PM by Regina Magaña MD.       XR Chest 1 View [[id number]] Collected: 08/12/24 1630     Updated: 08/12/24 1632    Narrative:      EXAM:    XR Chest, 1 View     EXAM DATE:    8/12/2024 4:26 PM     CLINICAL HISTORY:    CVC Placement Verification; E11.621-Type 2 diabetes mellitus with foot  ulcer; L97.416-Non-pressure chronic ulcer of right heel and midfoot with  bone involvement without evidence of necrosis; Z99.2-Dependence on renal  dialysis; R11.2-Nausea with vomiting, unspecified; N30.01-Acute cystitis  with hematuria; L97.519-Non-pressure chronic ulcer of other part of  right foot with unspecified severity     TECHNIQUE:    Frontal  view of the chest.     COMPARISON:    8/11/2024     FINDINGS:    LUNGS AND PLEURAL SPACES:  Coarsened interstitial markings noted  throughout the lungs.  No consolidation.  No pneumothorax.    HEART:  Unremarkable as visualized.  No cardiomegaly.    MEDIASTINUM:  Unremarkable as visualized.  Normal mediastinal contour.    BONES/JOINTS:  Unremarkable as visualized.  No acute fracture.    TUBES, LINES AND DEVICES:  Left subclavian central venous catheter tip  in the superior vena cava.       Impression:        No acute cardiopulmonary findings identified.        This report was finalized on 8/12/2024 4:30 PM by Dr. Harrison Biswas MD.       US Liver [[id number]] Collected: 08/11/24 1655     Updated: 08/11/24 1659    Narrative:      PROCEDURE: Abdominal ultrasound evaluation performed on August 11, 2024.  Color flow imaging performed.     HISTORY: Elevated liver function tests.     COMPARISON: None.     FINDINGS:     Echogenic liver suggestive of fatty infiltration.  Small volume of free fluid adjacent to the liver consistent with  ascites.  Patent hepatic veins with appropriate direction of flow.  Patent main portal vein with appropriate direction of flow.  No hepatic mass  No dilated intrahepatic bile ducts  Sludge noted in the gallbladder lumen.  No gallbladder wall thickening or para cholecystic fluid.  No right renal hydronephrosis  No features of cholecystitis.       Impression:         1.  Echogenic liver suggestive of fatty infiltration.  2.  Patent main portal vein with appropriate direction of flow.  3.  Patent hepatic veins with appropriate direction of flow.  4.  Sludge noted in the gallbladder lumen.  5.  No features of cholecystitis.  6.  Small volume of free fluid in the right upper quadrant consistent  with ascites.  7.  No hepatic mass or cyst  8.  No dilated intrahepatic bile ducts.     This report was finalized on 8/11/2024 4:57 PM by Carlos Andrews MD.               Diagnostics: Reviewed    A: Reny GOMEZ  Kassy is a 64 y.o. female admitted on 8/2/2024 due to nausea, vomiting, with right foot pain and fever for a week before presenting to the ER. Reny has a medical history of arthritis, insulin dependent type II DM, chronic diastolic CHF, hypothyroidism, seizure disorder, HTN, HLD, GERD, iron deficiency anemia, PAD, and ESRD on PD,   Reny was given an antibiotic at prior ER visit on 7/21/24 due to a UTI and she stated after a couple of days of antibiotic she began to experience nausea and vomiting, apparently she stopped taking the antibiotic and symptoms continued to persist. This visit in the ED Reny did complain of increased rt foot pain. Reny was tachycardic. 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 Pulmonary, Infectious disease, and Nephrology are all consulted on Ms Elena.           P:    I was able to speak with Cliff today at bedside to discuss GOC/ACP as Reny is not able herself at this time. Cliff confirmed that Reny is a DNR/DNI. We discussed Reny's condition, quality of life and that Cliff would like to get her back home with him if she was able. I told Cliff that we would be her e for support for Reny and him. I discussed Reny/treatment plan with Dr Woodruff and GLORIA Dunham.    We appreciate the consult and the opportunity to participate in Reny Elena's care. We will continue to follow along. Please do not hesitate to contact us regarding further symptom management or goals of care needs, including after hours or on weekends via our on call provider at [phone].     Time: 55 minutes spent reviewing medical and medication records, assessing and examining patient, discussing with family, answering questions, providing some guidance about a plan and documentation of care, and coordinating care with other healthcare members, with > 50% time spent face to face.     Jennifer Sanders, APRN    8/14/2024

## 2024-08-14 NOTE — PROGRESS NOTES
PROGRESS NOTE         Patient Identification:  Name:  Reny Elena  Age:  64 y.o.  Sex:  female  :  1960  MRN:  [id number]  Visit Number:  [id number]  Primary Care Provider:  Susan Stephens APRN         LOS: 11 days       ----------------------------------------------------------------------------------------------------------------------  Subjective       Chief Complaints:    Vomiting        Interval History:      The patient is awake, confused.  Sitting up in bed on room air with no apparent distress.  Afebrile.  No reports of diarrhea.  Primary RN at the bedside, the patient has been having a lot of bleeding from central line site.  DIC workup in process.  Lung exam is diminished to auscultation bilaterally.  Abdomen soft and nontender with normoactive bowel sounds.  Right AKA stump is dressed with Ace wrap, clean dry and intact.  WBC normal at 5.70.  Platelet count 104.  Peritoneal fluid culture from  preliminarily reports no growth at less than 24 hours.  Fungal culture pending.  CT chest, abdomen and pelvis without contrast reports small to moderate left pleural effusion with adjacent airspace consolidation suggesting atelectasis or pneumonia.  Diffuse groundglass attenuation of the lungs compatible with alveolitis.  Mildly distended gallbladder with indistinct appearance.  If acute cholecystitis is of clinical concern, right upper quadrant ultrasound could be considered.  Anasarca.        Review of Systems:    EMMANUELLE due to confusion  ----------------------------------------------------------------------------------------------------------------------      Objective       Current [facility] Meds:  aspirin, 81 mg, Oral, Daily  [Held by provider] atorvastatin, 80 mg, Oral, Nightly  ferrous sulfate, 325 mg, Oral, Daily With Breakfast  FLUoxetine, 10 mg, Oral, Daily  folic acid, 1 mg, Oral, Daily  hydrocortisone sodium succinate, 50 mg, Intravenous, Q6H  insulin lispro, 2-7 Units,  Subcutaneous, 4x Daily AC & at Bedtime  lacosamide, 50 mg, Oral, Q12H  levothyroxine, 100 mcg, Oral, QAM AC  midodrine, 7.5 mg, Oral, TID AC  mupirocin, 1 Application, Topical, Q12H  pantoprazole, 40 mg, Oral, QAM AC  piperacillin-tazobactam, 3.375 g, Intravenous, Q12H  sodium chloride, 1,000 mL, Intravenous, Once  sodium chloride, 10 mL, Intravenous, Q12H  sodium chloride, 10 mL, Intravenous, Q12H  sodium chloride, 10 mL, Intravenous, Q12H  sodium chloride, 10 mL, Intravenous, Q12H  sodium chloride, 10 mL, Intravenous, Q12H  traZODone, 25 mg, Oral, Nightly  Vancomycin Pharmacy Intermittent/Pulse Dosing, , Does not apply, Daily      norepinephrine, 0.02-0.3 mcg/kg/min, Last Rate: Stopped (08/13/24 0322)  Pharmacy Consult - Pharmacy to dose,   Pharmacy Consult - Pharmacy to dose,   phenylephrine, 0.5-3 mcg/kg/min, Last Rate: Stopped (08/13/24 0519)  sodium chloride 0.45 % 1,000 mL with sodium bicarbonate 8.4 % 75 mEq infusion, , Last Rate: 75 mL/hr at 08/14/24 0800        ----------------------------------------------------------------------------------------------------------------------    Vital Signs:  Temp:  [96.6 °F (35.9 °C)-98.1 °F (36.7 °C)] 97.7 °F (36.5 °C)  Heart Rate:  [] 89  Resp:  [8-18] 14  BP: ()/() 164/97  Mean Arterial Pressure (Non-Invasive) for the past 24 hrs (Last 3 readings):   Noninvasive MAP (mmHg)   08/14/24 1100 129   08/14/24 1057 128   08/14/24 1045 123       SpO2 Percentage    08/14/24 1057 08/14/24 1100 08/14/24 1112   SpO2: 100% 100% 100%     SpO2:  [79 %-100 %] 100 %  on   ;   Device (Oxygen Therapy): room air    Body mass index is 17.59 kg/m².  Wt Readings from Last 3 Encounters:   08/14/24 46.5 kg (102 lb 8.2 oz)   07/26/24 45.4 kg (100 lb)   07/26/24 51.7 kg (114 lb)        Intake/Output Summary (Last 24 hours) at 8/14/2024 1116  Last data filed at 8/14/2024 0958  Gross per 24 hour   Intake 3036.5 ml   Output 400 ml   Net 2636.5 ml     Diet: Diabetic; Consistent  Carbohydrate; Feeding Assistance - Nursing; Texture: Pureed (NDD 1); Fluid Consistency: Thin (IDDSI 0)  ----------------------------------------------------------------------------------------------------------------------      Physical Exam:    Constitutional: Frail elderly female resting quietly in bed.  Confused.  Primary RN at the bedside.  On room air with no apparent distress.  HENT:  Head: Normocephalic and atraumatic.  Mouth:  Moist mucous membranes.    Eyes:  Conjunctivae and EOM are normal.  No scleral icterus.  Neck:  Neck supple.  No JVD present.    Cardiovascular:  Normal rate, regular rhythm and normal heart sounds with no murmur. No edema.  Pulmonary/Chest: Diminished to auscultation bilaterally no respiratory distress, no wheezes, no crackles, with normal breath sounds and good air movement.  Abdominal:  peritoneal dialysis catheter. Soft.  Bowel sounds are normal.  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  Neurological:  Alert and oriented to person, place, and time.  No facial droop.  No slurred speech.    Skin: Bleeding noted to left IJ central line insertion site.  Right AKA stump is dressed, clean dry and intact      ----------------------------------------------------------------------------------------------------------------------  Results from last 7 days   Lab Units 08/11/24  0744   CK TOTAL U/L 90         Results from last 7 days   Lab Units 08/14/24  0503   PH, ARTERIAL pH units 7.411   PO2 ART mm Hg 72.0*   PCO2, ARTERIAL mm Hg 34.6*   HCO3 ART mmol/L 21.9     Results from last 7 days   Lab Units 08/14/24  0702 08/14/24  0452 08/13/24  2329 08/13/24  2154 08/13/24  0616 08/13/24  0005 08/13/24  0004 08/12/24  1918 08/10/24  0528 08/09/24  0049   CRP mg/dL  --   --   --   --   --   --   --  1.65*  --   --    LACTATE mmol/L  --   --   --  1.5 2.5* 3.5*  --   --    < >  --    WBC 10*3/mm3 5.70  --  5.07  --   --   --   12.24*  --    < > 5.34   HEMOGLOBIN g/dL 9.2* 9.1* 7.1*  --   --   --  7.9*  --    < > 9.5*   HEMATOCRIT % 26.8* 27.4* 21.3*  --   --   --  23.9*  --    < > 31.6*   MCV fL 92.7  --  93.0  --   --   --  93.7  --    < > 102.3*   MCHC g/dL 34.3  --  33.3  --   --   --  33.1  --    < > 30.1*   PLATELETS 10*3/mm3 104*  --  101*  --   --   --  178  --    < > 149   INR  1.46*  --   --  2.13*  --   --   --   --   --  0.96    < > = values in this interval not displayed.     Results from last 7 days   Lab Units 08/14/24  0702 08/13/24  2154 08/13/24  0004 08/12/24  0312 08/11/24  1252 08/11/24  0744   SODIUM mmol/L 134* 134* 135* 136   < > 135*   POTASSIUM mmol/L 3.7 3.8 3.9 3.3*   < > 3.5   MAGNESIUM mg/dL  --   --   --  1.3*  --  1.3*   CHLORIDE mmol/L 100 102 99 104   < > 105   CO2 mmol/L 18.5* 17.9* 15.3* 15.9*   < > 18.9*   BUN mg/dL 21 20 19 18   < > 18   CREATININE mg/dL 3.03* 2.96* 2.88* 2.85*   < > 3.12*   CALCIUM mg/dL 6.8* 6.6* 6.5* 5.7*   < > 6.0*   GLUCOSE mg/dL 136* 140* 151* 229*   < > 62*   ALBUMIN g/dL 2.2* 2.0* 3.5 1.4*   < > 1.6*   BILIRUBIN mg/dL 0.9 0.5 0.7 0.3   < > 0.3   ALK PHOS U/L 433* 451* 323* 196*   < > 182*   AST (SGOT) U/L 1,095* 1,449* 3,281* 2,747*   < > 965*   ALT (SGPT) U/L 73* 98* 296* 322*   < > 147*    < > = values in this interval not displayed.   Estimated Creatinine Clearance: 13.4 mL/min (A) (by C-G formula based on SCr of 3.03 mg/dL (H)).  Ammonia   Date Value Ref Range Status   08/11/2024 22 11 - 51 umol/L Final       Glucose   Date/Time Value Ref Range Status   08/14/2024 1040 149 (H) 70 - 130 mg/dL Final   08/14/2024 0701 155 (H) 70 - 130 mg/dL Final   08/13/2024 2124 150 (H) 70 - 130 mg/dL Final   08/13/2024 1650 173 (H) 70 - 130 mg/dL Final   08/13/2024 1128 157 (H) 70 - 130 mg/dL Final   08/13/2024 0647 206 (H) 70 - 130 mg/dL Final   08/12/2024 2356 146 (H) 70 - 130 mg/dL Final   08/12/2024 2052 148 (H) 70 - 130 mg/dL Final     Lab Results   Component Value Date    HGBA1C 6.10  "(H) 08/02/2024     Lab Results   Component Value Date    TSH 3.580 08/13/2024    FREET4 1.88 (H) 08/13/2024       Blood Culture   Date Value Ref Range Status   08/02/2024 No growth at 24 hours  Preliminary   08/02/2024 No growth at 24 hours  Preliminary     No results found for: \"URINECX\"  No results found for: \"WOUNDCX\"  No results found for: \"STOOLCX\"  No results found for: \"RESPCX\"  Pain Management Panel  More data exists         Latest Ref Rng & Units 7/21/2024 4/28/2024   Pain Management Panel   Amphetamine, Urine Qual Negative Negative  Negative    Barbiturates Screen, Urine Negative Negative  Negative    Benzodiazepine Screen, Urine Negative Negative  Negative    Buprenorphine, Screen, Urine Negative Negative  Negative    Cocaine Screen, Urine Negative Negative  Negative    Fentanyl, Urine Negative Negative  Negative    Methadone Screen , Urine Negative Negative  Negative    Methamphetamine, Ur Negative Negative  Negative       Details                     ----------------------------------------------------------------------------------------------------------------------  Imaging Results (Last 24 Hours)       Procedure Component Value Units Date/Time    CT Head Without Contrast [[id number]] Collected: 08/14/24 0059     Updated: 08/14/24 0101    Narrative:      Noncontrast CT brain     Indications: Altered mental status     Technique: Noncontrast CT images of the brain were obtained.  Limited  exposure control, adjustment of the MA and/or KV according to patient  size or use of an iterative reconstruction technique was utilized.     Findings CT brain:     Comparison is made to the prior study dated 4/28/2024.     Examination is somewhat limited by patient positioning.  Axial images  are not true brain axial images.  Allowing for this, there is no  evidence of acute intercranial hemorrhage.  The ventricles are  prominent, stable from the prior study.  Patchy and confluent areas of  hypodensity involve the " periventricular deep white matter compatible  sequelae of chronic small vessel ischemic changes.  There is no  mass-effect or midline shift.  No abnormal extra-axial fluid collections  are demonstrated.       Impression:      Impression:     No CT evidence of an acute intracranial process.     This report was finalized on 8/14/2024 12:59 AM by Chin Chowdhury MD.       CT Chest Without Contrast Diagnostic [[id number]] Collected: 08/13/24 1955     Updated: 08/13/24 2010    Narrative:      Procedure: Contiguous axial images were obtained through the chest  abdomen and pelvis without intravenous contrast.  The use of sagittal  coronal reformations are supplied.     Comparison: CT abdomen 8/22/2024, chest 4/28/2024     Findings     CHEST: Chest is well-expanded with diffuse groundglass attenuation  throughout all lobes.     A small left pleural effusion is present with airspace consolidation.     Trachea and mainstem bronchi are patent.     Central venous catheter present with distal tip at the atrial caval  junction.  Heart size within normal limits.     No adenopathy in the chest.     Cachexia and anasarca noted.     In bone windows, sternum, thoracic vertebral bodies and ribs are intact.   No pneumothorax.     ABDOMEN/PELVIS: Moderate ascites present.     Diffuse anasarca noted.  A catheter enters the mid abdomen, to the left  of midline with distal tip coiled in the left mid abdomen.     There may be fluid surrounding the gallbladder which is not  well-defined.     No extraluminal gas.  Urinary bladder distends normally.  A large amount  of free fluid in the pelvis.  Uterus is not identified.  A small amount  of formed stool present in the colon.  A discrete appendix is not  identified.     The noncontrast enhanced liver, spleen, stomach and adrenals are  morphologically unremarkable.  Kidneys are atrophic with calcified  vascular structures.     No dilated loops of bowel or bowel obstruction.     Above-the-knee  amputation noted on the  view.     Moderate degenerative change at L5-S1.       Impression:         1.  Small to moderate left pleural effusion with adjacent airspace  consolidation suggesting atelectasis or pneumonia.     2.  Diffuse groundglass attenuation of the lungs compatible with  alveolitis.     3.   Mildly distended gallbladder with indistinct appearance.  If acute  cholecystitis is of clinical concern, right upper quadrant ultrasound  could be considered.     4.  Anasarca              This report was finalized on 8/13/2024 8:08 PM by Regina Magaña MD.       CT Abdomen Pelvis Without Contrast [[id number]] Collected: 08/13/24 1955     Updated: 08/13/24 2010    Narrative:      Procedure: Contiguous axial images were obtained through the chest  abdomen and pelvis without intravenous contrast.  The use of sagittal  coronal reformations are supplied.     Comparison: CT abdomen 8/22/2024, chest 4/28/2024     Findings     CHEST: Chest is well-expanded with diffuse groundglass attenuation  throughout all lobes.     A small left pleural effusion is present with airspace consolidation.     Trachea and mainstem bronchi are patent.     Central venous catheter present with distal tip at the atrial caval  junction.  Heart size within normal limits.     No adenopathy in the chest.     Cachexia and anasarca noted.     In bone windows, sternum, thoracic vertebral bodies and ribs are intact.   No pneumothorax.     ABDOMEN/PELVIS: Moderate ascites present.     Diffuse anasarca noted.  A catheter enters the mid abdomen, to the left  of midline with distal tip coiled in the left mid abdomen.     There may be fluid surrounding the gallbladder which is not  well-defined.     No extraluminal gas.  Urinary bladder distends normally.  A large amount  of free fluid in the pelvis.  Uterus is not identified.  A small amount  of formed stool present in the colon.  A discrete appendix is not  identified.     The noncontrast  enhanced liver, spleen, stomach and adrenals are  morphologically unremarkable.  Kidneys are atrophic with calcified  vascular structures.     No dilated loops of bowel or bowel obstruction.     Above-the-knee amputation noted on the  view.     Moderate degenerative change at L5-S1.       Impression:         1.  Small to moderate left pleural effusion with adjacent airspace  consolidation suggesting atelectasis or pneumonia.     2.  Diffuse groundglass attenuation of the lungs compatible with  alveolitis.     3.   Mildly distended gallbladder with indistinct appearance.  If acute  cholecystitis is of clinical concern, right upper quadrant ultrasound  could be considered.     4.  Anasarca              This report was finalized on 8/13/2024 8:08 PM by Regina Magaña MD.               ----------------------------------------------------------------------------------------------------------------------    Pertinent Infectious Disease Results    Thank you Dr. Crawford for allowing us to participate in the care of your patient.  As you well know, Ms. Reny Elena is a 64 y.o. female with past medical history significant for seizure disorder, type 2 diabetes, arthritis, CHF, hypothyroidism, hypertension, hyperlipidemia, GERD, PAD, ESRD, iron deficiency anemia,, who presented to [facility] Emergency Department on 8/2/2024 for nausea, vomiting, diarrhea and worsening pain of right diabetic foot wound.  Patient was recently seen in our ED on 7/21/2024 with complaints of hallucinations, fever, and right foot pain.  At this time, patient was diagnosed with right heel ulcer and UTI.  She was sent home with an antibiotic but was unsure of the name.  Patient reported she started having nausea and vomiting after taking the antibiotics so she stopped taking them.  However, she continued to have nausea and vomiting.       Patient was tachycardic in the ED.Labs show K+ at upper limit of normal, BUN 30, cr 4.36,  glucose 176, alk phos 323 and albumin 2.9, otherwise normal LFTs. CRP mildly elevated at 2.34, while lactate and WBC are normal. UA shows large leuk esterase and unable to determine RBC, WBC, bacteria of squamous cells due to loaded field.  Patient's urine culture from 7/21/2024 grew E. coli.  She had also been following with wound care who it obtained a wound culture on the same day.  This culture finalized with mixed gram-negative maurisio and light growth of Enterococcus faecalis.       X-ray of the right foot performed on 8/2/2024 revealed partial amputation of the fifth metatarsal. Bony demineralization. No acute fracture or dislocation. No lytic or blastic bony lesions seen. Diffuse interphalangeal joint space loss. Mild degenerative changes in the midfoot. No cortical erosion. Fixation timur in the tibia/talus/calcaneus noted. Diffuse soft tissue swelling. Vascular calcifications. No soft tissue gas.  CT abdomen/pelvis revealed peritoneal dialysis catheter noted in the anterior right abdominal wall with the catheter noted to terminate in the left lateral pelvis. Small volume of free fluid in the right upper quadrant and lower posterior pelvis and right lower quadrant. Slightly elevated left hemidiaphragm. No bowel or renal obstruction. No abdominal aortic aneurysm. No features of acute appendicitis. Stranding identified in the presacral space could present changes related to mild distal colitis. Fluid-filled bladder with small volume of air in the bladder lumen. No pneumatosis. No conclusive features of free air. Distended configuration to the gallbladder likely due to fasting. No conclusive features of gastritis or enteritis.       Patient continued to have nausea and vomiting after 2 doses of Zofran.  She was admitted for further treatment.  Subsequently, patient was started on IV Rocephin in the setting of E. coli UTI.  Flagyl was added to cover possible colitis.  Patient was also started on IV vancomycin for  Enterococcus faecalis wound infection.  General surgery consulted for possible debridement of patient's wound.     Assessment/Plan       Assessment     R foot wound infection, Enterococcus faecalis  Possible osteomyelitis of R foot  Hardware present in RLE  E. Coli UTI  Possible colitis         Plan      The patient is awake, confused.  Sitting up in bed on room air with no apparent distress.  Afebrile.  No reports of diarrhea.  Primary RN at the bedside, the patient has been having a lot of bleeding from central line site.  DIC workup in process.  Lung exam is diminished to auscultation bilaterally.  Abdomen soft and nontender with normoactive bowel sounds.  Right AKA stump is dressed with Ace wrap, clean dry and intact.  WBC normal at 5.70.  Platelet count 104.  Peritoneal fluid culture from 8/13 preliminarily reports no growth at less than 24 hours.  Fungal culture pending.  CT chest, abdomen and pelvis without contrast reports small to moderate left pleural effusion with adjacent airspace consolidation suggesting atelectasis or pneumonia.  Diffuse groundglass attenuation of the lungs compatible with alveolitis.  Mildly distended gallbladder with indistinct appearance.  If acute cholecystitis is of clinical concern, right upper quadrant ultrasound could be considered.  Anasarca.    For now in the setting of possible new onset pneumonia and/or possible acute cholecystitis, recommend continuing Zosyn and vancomycin courses.  We will continue to monitor closely and adjust antibiotic coverage as appropriate.      ANTIMICROBIAL THERAPY    cefdinir - 300 MG  piperacillin-tazobactam (ZOSYN) IVPB 3.375 g IVPB in 100 mL NS (VTB)  Vancomycin Pharmacy Intermittent/Pulse Dosing     Code Status:   Code Status and Medical Interventions: No CPR (Do Not Attempt to Resuscitate); Limited Support; No intubation (DNI)   Ordered at: 08/04/24 7587     Medical Intervention Limits:    No intubation (DNI)     Code Status (Patient has no  pulse and is not breathing):    No CPR (Do Not Attempt to Resuscitate)     Medical Interventions (Patient has pulse or is breathing):    Limited Support       DARIUS Pike  08/14/24  11:16 EDT

## 2024-08-14 NOTE — SIGNIFICANT NOTE
08/14/24 1415   OTHER   Discipline physical therapist   Rehab Time/Intention   Session Not Performed other (see comments)   Therapy Assessment/Plan (PT)   Criteria for Skilled Interventions Met (PT) yes   Recommendation   PT - Next Appointment   (PT visited pt for treatment, pt would not open eyes or talk with therapist after multiple cues/attempts, would not follow commands. Due to lack of pt interaction, treatment deferred. Encouraged participation d/t risk of further debility & deterioration.)

## 2024-08-14 NOTE — PLAN OF CARE
Problem: Adult Inpatient Plan of Care  Goal: Absence of Hospital-Acquired Illness or Injury  Intervention: Prevent Skin Injury  Recent Flowsheet Documentation  Taken 8/14/2024 1600 by Roly Chinchilla RN  Body Position:   turned   left  Taken 8/14/2024 1400 by Roly Chinchilla RN  Body Position:   turned   right  Taken 8/14/2024 1200 by Roly Chinchilla RN  Body Position:   turned   left  Taken 8/14/2024 1000 by Roly Chinchilla RN  Body Position:   turned   right  Taken 8/14/2024 0800 by Roly Chinchilla RN  Body Position:   turned   left  Skin Protection:   adhesive use limited   incontinence pads utilized   skin-to-device areas padded   transparent dressing maintained   tubing/devices free from skin contact     Problem: Adult Inpatient Plan of Care  Goal: Optimal Comfort and Wellbeing  Intervention: Monitor Pain and Promote Comfort  Recent Flowsheet Documentation  Taken 8/14/2024 1400 by Roly Chinchilla RN  Pain Management Interventions: see MAR  Taken 8/14/2024 0800 by Roly Chinchilla RN  Pain Management Interventions: see MAR     Problem: Adult Inpatient Plan of Care  Goal: Plan of Care Review  Outcome: Ongoing, Not Progressing  Flowsheets  Taken 8/14/2024 1604 by Roly Chinchilla RN  Progress: no change  Outcome Evaluation: Pt. alert throughout shift, complaints of pain, see MAR, palliative consulted, pt. received FFP and vitamin K throughout shift due to continued bleeding, wound care completed, pt. lethargic at this time, VSS, room air, bed in lowest position, alarms active and audible, no needs noted at this time.  Taken 8/12/2024 1508 by Gege Benson RN  Plan of Care Reviewed With: patient  Goal: Patient-Specific Goal (Individualized)  Outcome: Ongoing, Not Progressing  Goal: Absence of Hospital-Acquired Illness or Injury  Outcome: Ongoing, Not Progressing  Intervention: Identify and Manage Fall Risk  Recent Flowsheet Documentation  Taken 8/14/2024 1600 by Roly Chinchilla RN  Safety Promotion/Fall  Prevention:   activity supervised   fall prevention program maintained   safety round/check completed  Taken 8/14/2024 1500 by Roly Chinchilla RN  Safety Promotion/Fall Prevention:   activity supervised   fall prevention program maintained   safety round/check completed  Taken 8/14/2024 1400 by Roly Chinchilla RN  Safety Promotion/Fall Prevention:   activity supervised   fall prevention program maintained   safety round/check completed  Taken 8/14/2024 1300 by Roly Chinchilla RN  Safety Promotion/Fall Prevention:   activity supervised   fall prevention program maintained   safety round/check completed  Taken 8/14/2024 1200 by Roly Chinchilla RN  Safety Promotion/Fall Prevention:   activity supervised   fall prevention program maintained   safety round/check completed  Taken 8/14/2024 1100 by Roly Chinchilla RN  Safety Promotion/Fall Prevention:   activity supervised   fall prevention program maintained   safety round/check completed  Taken 8/14/2024 1000 by Roly Chinchilla RN  Safety Promotion/Fall Prevention:   activity supervised   fall prevention program maintained   safety round/check completed  Taken 8/14/2024 0900 by Roly Chinchilla RN  Safety Promotion/Fall Prevention:   activity supervised   fall prevention program maintained   safety round/check completed  Taken 8/14/2024 0800 by Roly Chinchilla RN  Safety Promotion/Fall Prevention:   activity supervised   fall prevention program maintained   safety round/check completed  Taken 8/14/2024 0700 by Roly Chinchilla RN  Safety Promotion/Fall Prevention:   activity supervised   fall prevention program maintained   safety round/check completed  Intervention: Prevent Skin Injury  Recent Flowsheet Documentation  Taken 8/14/2024 1600 by Roly Chinchilla RN  Body Position:   turned   left  Taken 8/14/2024 1400 by Roly Chinchilla RN  Body Position:   turned   right  Taken 8/14/2024 1200 by Roly Chinchilla RN  Body Position:   turned   left  Taken 8/14/2024 1000 by  Roly Chinchilla RN  Body Position:   turned   right  Taken 8/14/2024 0800 by Roly Chinchilla RN  Body Position:   turned   left  Skin Protection:   adhesive use limited   incontinence pads utilized   skin-to-device areas padded   transparent dressing maintained   tubing/devices free from skin contact  Intervention: Prevent and Manage VTE (Venous Thromboembolism) Risk  Recent Flowsheet Documentation  Taken 8/14/2024 1400 by Roly Chinchilla RN  Activity Management: bedrest  VTE Prevention/Management:   left   sequential compression devices on  Range of Motion:   ROM (range of motion) performed   active ROM (range of motion) encouraged  Taken 8/14/2024 0800 by Roly Chinchilla RN  Activity Management: bedrest  VTE Prevention/Management: (unable to place R scd due to AKA)   left   sequential compression devices on   other (see comments)  Range of Motion: ROM (range of motion) performed  Intervention: Prevent Infection  Recent Flowsheet Documentation  Taken 8/14/2024 1400 by Roly Chinchilla RN  Infection Prevention:   single patient room provided   rest/sleep promoted  Taken 8/14/2024 0800 by Roly Chinchilla RN  Infection Prevention:   single patient room provided   rest/sleep promoted  Goal: Optimal Comfort and Wellbeing  Outcome: Ongoing, Not Progressing  Intervention: Monitor Pain and Promote Comfort  Recent Flowsheet Documentation  Taken 8/14/2024 1400 by Roly Chinchilla RN  Pain Management Interventions: see MAR  Taken 8/14/2024 0800 by Roly Chinchilla RN  Pain Management Interventions: see MAR  Intervention: Provide Person-Centered Care  Recent Flowsheet Documentation  Taken 8/14/2024 1400 by Roly Chinchilla RN  Trust Relationship/Rapport:   care explained   emotional support provided   thoughts/feelings acknowledged   reassurance provided  Taken 8/14/2024 0800 by Roly Chinchilla RN  Trust Relationship/Rapport:   care explained   emotional support provided   questions answered   reassurance provided    thoughts/feelings acknowledged  Goal: Readiness for Transition of Care  Outcome: Ongoing, Not Progressing     Problem: Adult Inpatient Plan of Care  Goal: Optimal Comfort and Wellbeing  Intervention: Monitor Pain and Promote Comfort  Recent Flowsheet Documentation  Taken 8/14/2024 1400 by Roly Chinchilla RN  Pain Management Interventions: see MAR  Taken 8/14/2024 0800 by Roly Chinchilla RN  Pain Management Interventions: see MAR     Problem: Diabetes Comorbidity  Goal: Blood Glucose Level Within Targeted Range  Outcome: Ongoing, Not Progressing  Intervention: Monitor and Manage Glycemia  Recent Flowsheet Documentation  Taken 8/14/2024 0800 by Roly Chinchilla RN  Glycemic Management: blood glucose monitored     Problem: Heart Failure Comorbidity  Goal: Maintenance of Heart Failure Symptom Control  Intervention: Maintain Heart Failure-Management  Recent Flowsheet Documentation  Taken 8/14/2024 1600 by Roly Chinchilla RN  Medication Review/Management: medications reviewed  Taken 8/14/2024 1500 by Roly Chinchilla RN  Medication Review/Management: medications reviewed  Taken 8/14/2024 1400 by Roly Chinchilla RN  Medication Review/Management: medications reviewed  Taken 8/14/2024 1300 by Roly Chinchilla RN  Medication Review/Management: medications reviewed  Taken 8/14/2024 1200 by Roly Chinchilla RN  Medication Review/Management: medications reviewed  Taken 8/14/2024 1100 by Roly Chinchilla RN  Medication Review/Management: medications reviewed  Taken 8/14/2024 1000 by Roly Chinchilla RN  Medication Review/Management: medications reviewed  Taken 8/14/2024 0900 by Roly Chinchilla RN  Medication Review/Management: medications reviewed  Taken 8/14/2024 0800 by Roly Chinchilla RN  Medication Review/Management: medications reviewed  Taken 8/14/2024 0700 by Roly Chinchilla RN  Medication Review/Management: medications reviewed     Problem: Skin Injury Risk Increased  Goal: Skin Health and Integrity  Intervention: Optimize Skin  Protection  Recent Flowsheet Documentation  Taken 8/14/2024 1600 by Roly Chinchilla RN  Head of Bed ([facility]) Positioning: HOB at 30-45 degrees  Taken 8/14/2024 1400 by Roly Chinchilla RN  Head of Bed ([facility]) Positioning: HOB at 30-45 degrees  Taken 8/14/2024 1200 by Roly Chinchilla RN  Head of Bed ([facility]) Positioning: HOB at 30-45 degrees  Taken 8/14/2024 1000 by Roly Chinchilla RN  Head of Bed ([facility]) Positioning: HOB at 30-45 degrees  Taken 8/14/2024 0800 by Roly Chinchilla RN  Pressure Reduction Techniques:   heels elevated off bed   pressure points protected   weight shift assistance provided  Head of Bed ([facility]) Positioning: HOB at 30-45 degrees  Pressure Reduction Devices:   alternating pressure pump (ADD)   foam padding utilized   heel offloading device utilized   positioning supports utilized   specialty bed utilized  Skin Protection:   adhesive use limited   incontinence pads utilized   skin-to-device areas padded   transparent dressing maintained   tubing/devices free from skin contact     Problem: Skin Injury Risk Increased  Goal: Skin Health and Integrity  Outcome: Ongoing, Not Progressing  Intervention: Optimize Skin Protection  Recent Flowsheet Documentation  Taken 8/14/2024 1600 by Roly Chinchilla RN  Head of Bed ([facility]) Positioning: HOB at 30-45 degrees  Taken 8/14/2024 1400 by Roly Chinchilla RN  Head of Bed ([facility]) Positioning: HOB at 30-45 degrees  Taken 8/14/2024 1200 by Roly Chinchilla RN  Head of Bed ([facility]) Positioning: HOB at 30-45 degrees  Taken 8/14/2024 1000 by Roly Chinchilla RN  Head of Bed ([facility]) Positioning: HOB at 30-45 degrees  Taken 8/14/2024 0800 by Roly Chinchilla RN  Pressure Reduction Techniques:   heels elevated off bed   pressure points protected   weight shift assistance provided  Head of Bed ([facility]) Positioning: HOB at 30-45 degrees  Pressure Reduction Devices:   alternating pressure pump (ADD)   foam padding utilized   heel offloading device utilized   positioning supports utilized   specialty  bed utilized  Skin Protection:   adhesive use limited   incontinence pads utilized   skin-to-device areas padded   transparent dressing maintained   tubing/devices free from skin contact     Problem: Fall Injury Risk  Goal: Absence of Fall and Fall-Related Injury  Outcome: Ongoing, Not Progressing  Intervention: Identify and Manage Contributors  Recent Flowsheet Documentation  Taken 8/14/2024 1600 by Roly Chinchilla RN  Medication Review/Management: medications reviewed  Taken 8/14/2024 1500 by Roly Chinchilla RN  Medication Review/Management: medications reviewed  Taken 8/14/2024 1400 by Roly Chinchilla RN  Medication Review/Management: medications reviewed  Taken 8/14/2024 1300 by Roly Chinchilla RN  Medication Review/Management: medications reviewed  Taken 8/14/2024 1200 by Roly Chinchilla RN  Medication Review/Management: medications reviewed  Taken 8/14/2024 1100 by Roly Chinchilla RN  Medication Review/Management: medications reviewed  Taken 8/14/2024 1000 by Roly Chinchilla RN  Medication Review/Management: medications reviewed  Taken 8/14/2024 0900 by Roly Chinchilla RN  Medication Review/Management: medications reviewed  Taken 8/14/2024 0800 by Roly Chinchilla RN  Medication Review/Management: medications reviewed  Taken 8/14/2024 0700 by Roly Chinchilla RN  Medication Review/Management: medications reviewed  Intervention: Promote Injury-Free Environment  Recent Flowsheet Documentation  Taken 8/14/2024 1600 by Roly Chinchilla RN  Safety Promotion/Fall Prevention:   activity supervised   fall prevention program maintained   safety round/check completed  Taken 8/14/2024 1500 by Roly Chinchilla RN  Safety Promotion/Fall Prevention:   activity supervised   fall prevention program maintained   safety round/check completed  Taken 8/14/2024 1400 by Roly Chinchilla RN  Safety Promotion/Fall Prevention:   activity supervised   fall prevention program maintained   safety round/check completed  Taken 8/14/2024 1300 by  Renée, Roly, RN  Safety Promotion/Fall Prevention:   activity supervised   fall prevention program maintained   safety round/check completed  Taken 8/14/2024 1200 by Roly Chinchilla RN  Safety Promotion/Fall Prevention:   activity supervised   fall prevention program maintained   safety round/check completed  Taken 8/14/2024 1100 by Roly Chinchilla RN  Safety Promotion/Fall Prevention:   activity supervised   fall prevention program maintained   safety round/check completed  Taken 8/14/2024 1000 by Roly Chinchilla RN  Safety Promotion/Fall Prevention:   activity supervised   fall prevention program maintained   safety round/check completed  Taken 8/14/2024 0900 by Roly Chinchilla RN  Safety Promotion/Fall Prevention:   activity supervised   fall prevention program maintained   safety round/check completed  Taken 8/14/2024 0800 by Roly Chinchilla RN  Safety Promotion/Fall Prevention:   activity supervised   fall prevention program maintained   safety round/check completed  Taken 8/14/2024 0700 by Roly Chinchilla RN  Safety Promotion/Fall Prevention:   activity supervised   fall prevention program maintained   safety round/check completed     Problem: Pain Acute  Goal: Acceptable Pain Control and Functional Ability  Outcome: Ongoing, Not Progressing  Intervention: Prevent or Manage Pain  Recent Flowsheet Documentation  Taken 8/14/2024 1600 by Roly Chinchilla RN  Medication Review/Management: medications reviewed  Taken 8/14/2024 1500 by Roly Chinchilla RN  Medication Review/Management: medications reviewed  Taken 8/14/2024 1400 by Roly Chinchilla RN  Medication Review/Management: medications reviewed  Taken 8/14/2024 1300 by Roly Chinchilla RN  Medication Review/Management: medications reviewed  Taken 8/14/2024 1200 by Roly Chinchilla RN  Medication Review/Management: medications reviewed  Taken 8/14/2024 1100 by Roly Chinchilla RN  Medication Review/Management: medications reviewed  Taken 8/14/2024 1000 by Renée  GLORIA Dunham  Medication Review/Management: medications reviewed  Taken 8/14/2024 0900 by Roly Chinchilla RN  Medication Review/Management: medications reviewed  Taken 8/14/2024 0800 by Roly Chinchilla RN  Sleep/Rest Enhancement: consistent schedule promoted  Medication Review/Management: medications reviewed  Taken 8/14/2024 0700 by Roly Chinchilla RN  Medication Review/Management: medications reviewed  Intervention: Develop Pain Management Plan  Recent Flowsheet Documentation  Taken 8/14/2024 1400 by Roly Chinchilla RN  Pain Management Interventions: see MAR  Taken 8/14/2024 0800 by Roly Chinchilla RN  Pain Management Interventions: see MAR  Intervention: Optimize Psychosocial Wellbeing  Recent Flowsheet Documentation  Taken 8/14/2024 1400 by Roly Chinchilla RN  Diversional Activities: television  Taken 8/14/2024 0800 by Roly Chinchilla RN  Diversional Activities: television     Problem: Impaired Wound Healing  Goal: Optimal Wound Healing  Outcome: Ongoing, Not Progressing  Intervention: Promote Wound Healing  Recent Flowsheet Documentation  Taken 8/14/2024 1400 by Roly Chinchilla RN  Activity Management: bedrest  Pain Management Interventions: see MAR  Taken 8/14/2024 0800 by Roly Chinchilla RN  Activity Management: bedrest  Pain Management Interventions: see MAR  Sleep/Rest Enhancement: consistent schedule promoted     Problem: Nutrition Impaired (Sepsis/Septic Shock)  Goal: Optimal Nutrition Intake  Outcome: Ongoing, Not Progressing     Problem: Adjustment to Amputation (Lower Extremity Amputation)  Goal: Optimal Adjustment to Amputation  Outcome: Ongoing, Not Progressing  Intervention: Promote Patient and Family Adjustment to Amputation  Recent Flowsheet Documentation  Taken 8/14/2024 1400 by Roly Chinchilla RN  Family/Support System Care:   support provided   self-care encouraged  Taken 8/14/2024 0800 by Roly Chinchilla RN  Family/Support System Care:   support provided   self-care encouraged   Goal Outcome  Evaluation:           Progress: no change  Outcome Evaluation: Pt. alert throughout shift, complaints of pain, see MAR, palliative consulted, pt. received FFP and vitamin K throughout shift due to continued bleeding, wound care completed, pt. lethargic at this time, VSS, room air, bed in lowest position, alarms active and audible, no needs noted at this time.

## 2024-08-15 LAB
25(OH)D3 SERPL-MCNC: 10.9 NG/ML (ref 30–100)
ABO GROUP BLD: NORMAL
ACANTHOCYTES BLD QL SMEAR: NORMAL
ALBUMIN SERPL-MCNC: 2.9 G/DL (ref 3.5–5.2)
ALBUMIN/GLOB SERPL: 1.5 G/DL
ALP SERPL-CCNC: 422 U/L (ref 39–117)
ALT SERPL W P-5'-P-CCNC: 32 U/L (ref 1–33)
ANION GAP SERPL CALCULATED.3IONS-SCNC: 18.1 MMOL/L (ref 5–15)
ANISOCYTOSIS BLD QL: NORMAL
AST SERPL-CCNC: 400 U/L (ref 1–32)
ATMOSPHERIC PRESS: 729 MMHG
BASE EXCESS BLDV CALC-SCNC: 2.5 MMOL/L (ref 0–2)
BASOPHILS # BLD AUTO: 0 10*3/MM3 (ref 0–0.2)
BASOPHILS # BLD AUTO: 0 10*3/MM3 (ref 0–0.2)
BASOPHILS NFR BLD AUTO: 0 % (ref 0–1.5)
BASOPHILS NFR BLD AUTO: 0 % (ref 0–1.5)
BDY SITE: ABNORMAL
BH BB BLOOD EXPIRATION DATE: NORMAL
BH BB BLOOD TYPE BARCODE: 5100
BH BB BLOOD TYPE BARCODE: 6200
BH BB DISPENSE STATUS: NORMAL
BH BB PRODUCT CODE: NORMAL
BH BB UNIT NUMBER: NORMAL
BILIRUB SERPL-MCNC: 1.2 MG/DL (ref 0–1.2)
BLD GP AB SCN SERPL QL: NEGATIVE
BUN SERPL-MCNC: 23 MG/DL (ref 8–23)
BUN/CREAT SERPL: 9.1 (ref 7–25)
CALCIUM SPEC-SCNC: 7.4 MG/DL (ref 8.6–10.5)
CHLORIDE SERPL-SCNC: 98 MMOL/L (ref 98–107)
CO2 BLDA-SCNC: 27.1 MMOL/L (ref 22–33)
CO2 SERPL-SCNC: 20.9 MMOL/L (ref 22–29)
COHGB MFR BLD: 1.8 % (ref 0–5)
CREAT SERPL-MCNC: 2.54 MG/DL (ref 0.57–1)
CROSSMATCH INTERPRETATION: NORMAL
DEPRECATED RDW RBC AUTO: 47.4 FL (ref 37–54)
DEPRECATED RDW RBC AUTO: 52.5 FL (ref 37–54)
EGFRCR SERPLBLD CKD-EPI 2021: 20.3 ML/MIN/1.73
EOSINOPHIL # BLD AUTO: 0 10*3/MM3 (ref 0–0.4)
EOSINOPHIL # BLD AUTO: 0 10*3/MM3 (ref 0–0.4)
EOSINOPHIL NFR BLD AUTO: 0 % (ref 0.3–6.2)
EOSINOPHIL NFR BLD AUTO: 0 % (ref 0.3–6.2)
ERYTHROCYTE [DISTWIDTH] IN BLOOD BY AUTOMATED COUNT: 15.4 % (ref 12.3–15.4)
ERYTHROCYTE [DISTWIDTH] IN BLOOD BY AUTOMATED COUNT: 16.4 % (ref 12.3–15.4)
GLOBULIN UR ELPH-MCNC: 1.9 GM/DL
GLUCOSE BLDC GLUCOMTR-MCNC: 145 MG/DL (ref 70–130)
GLUCOSE BLDC GLUCOMTR-MCNC: 152 MG/DL (ref 70–130)
GLUCOSE BLDC GLUCOMTR-MCNC: 214 MG/DL (ref 70–130)
GLUCOSE BLDC GLUCOMTR-MCNC: 278 MG/DL (ref 70–130)
GLUCOSE SERPL-MCNC: 163 MG/DL (ref 65–99)
HBV SURFACE AG SERPL QL IA: NORMAL
HCO3 BLDV-SCNC: 26 MMOL/L (ref 22–28)
HCT VFR BLD AUTO: 33.6 % (ref 34–46.6)
HCT VFR BLD AUTO: 33.7 % (ref 34–46.6)
HCV AB SER QL: NORMAL
HGB BLD-MCNC: 11.2 G/DL (ref 12–15.9)
HGB BLD-MCNC: 11.4 G/DL (ref 12–15.9)
HGB BLDA-MCNC: 10.8 G/DL (ref 13.5–17.5)
HIV 1+2 AB+HIV1 P24 AG SERPL QL IA: NORMAL
IMM GRANULOCYTES # BLD AUTO: 0.07 10*3/MM3 (ref 0–0.05)
IMM GRANULOCYTES # BLD AUTO: 0.08 10*3/MM3 (ref 0–0.05)
IMM GRANULOCYTES NFR BLD AUTO: 1.7 % (ref 0–0.5)
IMM GRANULOCYTES NFR BLD AUTO: 2 % (ref 0–0.5)
INHALED O2 CONCENTRATION: 21 %
INR PPP: 0.88 (ref 0.9–1.1)
LYMPHOCYTES # BLD AUTO: 0.28 10*3/MM3 (ref 0.7–3.1)
LYMPHOCYTES # BLD AUTO: 0.34 10*3/MM3 (ref 0.7–3.1)
LYMPHOCYTES NFR BLD AUTO: 6.9 % (ref 19.6–45.3)
LYMPHOCYTES NFR BLD AUTO: 8.6 % (ref 19.6–45.3)
Lab: ABNORMAL
MAGNESIUM SERPL-MCNC: 1.3 MG/DL (ref 1.6–2.4)
MCH RBC QN AUTO: 28.9 PG (ref 26.6–33)
MCH RBC QN AUTO: 29.6 PG (ref 26.6–33)
MCHC RBC AUTO-ENTMCNC: 33.2 G/DL (ref 31.5–35.7)
MCHC RBC AUTO-ENTMCNC: 33.9 G/DL (ref 31.5–35.7)
MCV RBC AUTO: 85.1 FL (ref 79–97)
MCV RBC AUTO: 88.9 FL (ref 79–97)
METHGB BLD QL: 0.6 % (ref 0–3)
MODALITY: ABNORMAL
MONOCYTES # BLD AUTO: 0.16 10*3/MM3 (ref 0.1–0.9)
MONOCYTES # BLD AUTO: 0.3 10*3/MM3 (ref 0.1–0.9)
MONOCYTES NFR BLD AUTO: 4 % (ref 5–12)
MONOCYTES NFR BLD AUTO: 7.4 % (ref 5–12)
NEUTROPHILS NFR BLD AUTO: 3.39 10*3/MM3 (ref 1.7–7)
NEUTROPHILS NFR BLD AUTO: 3.42 10*3/MM3 (ref 1.7–7)
NEUTROPHILS NFR BLD AUTO: 84 % (ref 42.7–76)
NEUTROPHILS NFR BLD AUTO: 85.4 % (ref 42.7–76)
NRBC BLD AUTO-RTO: 0 /100 WBC (ref 0–0.2)
NRBC BLD AUTO-RTO: 0.5 /100 WBC (ref 0–0.2)
OXYHGB MFR BLDV: 81.4 % (ref 45–75)
PCO2 BLDV: 35.2 MM HG (ref 41–51)
PH BLDV: 7.48 PH UNITS (ref 7.32–7.42)
PHOSPHATE SERPL-MCNC: 3.4 MG/DL (ref 2.5–4.5)
PLATELET # BLD AUTO: 61 10*3/MM3 (ref 140–450)
PLATELET # BLD AUTO: 64 10*3/MM3 (ref 140–450)
PMV BLD AUTO: 10 FL (ref 6–12)
PMV BLD AUTO: 11.3 FL (ref 6–12)
PO2 BLDV: 41.2 MM HG (ref 27–53)
POTASSIUM SERPL-SCNC: 3.1 MMOL/L (ref 3.5–5.2)
PROT SERPL-MCNC: 4.8 G/DL (ref 6–8.5)
PROTHROMBIN TIME: 12 SECONDS (ref 12.1–14.7)
RBC # BLD AUTO: 3.79 10*6/MM3 (ref 3.77–5.28)
RBC # BLD AUTO: 3.95 10*6/MM3 (ref 3.77–5.28)
REF LAB TEST METHOD: NORMAL
RH BLD: POSITIVE
SAO2 % BLDCOV: 83.4 % (ref 45–75)
SMALL PLATELETS BLD QL SMEAR: NORMAL
SODIUM SERPL-SCNC: 137 MMOL/L (ref 136–145)
T&S EXPIRATION DATE: NORMAL
UNIT  ABO: NORMAL
UNIT  RH: NORMAL
VANCOMYCIN SERPL-MCNC: 27.4 MCG/ML (ref 5–40)
VENTILATOR MODE: ABNORMAL
WBC NRBC COR # BLD AUTO: 3.97 10*3/MM3 (ref 3.4–10.8)
WBC NRBC COR # BLD AUTO: 4.07 10*3/MM3 (ref 3.4–10.8)

## 2024-08-15 PROCEDURE — P9100 PATHOGEN TEST FOR PLATELETS: HCPCS

## 2024-08-15 PROCEDURE — 83735 ASSAY OF MAGNESIUM: CPT | Performed by: NURSE PRACTITIONER

## 2024-08-15 PROCEDURE — 99291 CRITICAL CARE FIRST HOUR: CPT | Performed by: INTERNAL MEDICINE

## 2024-08-15 PROCEDURE — 86803 HEPATITIS C AB TEST: CPT | Performed by: HOSPITALIST

## 2024-08-15 PROCEDURE — P9016 RBC LEUKOCYTES REDUCED: HCPCS

## 2024-08-15 PROCEDURE — 85025 COMPLETE CBC W/AUTO DIFF WBC: CPT | Performed by: HOSPITALIST

## 2024-08-15 PROCEDURE — 25010000002 MAGNESIUM SULFATE IN D5W 1G/100ML (PREMIX) 1-5 GM/100ML-% SOLUTION: Performed by: HOSPITALIST

## 2024-08-15 PROCEDURE — 99233 SBSQ HOSP IP/OBS HIGH 50: CPT | Performed by: NURSE PRACTITIONER

## 2024-08-15 PROCEDURE — 82805 BLOOD GASES W/O2 SATURATION: CPT

## 2024-08-15 PROCEDURE — 86850 RBC ANTIBODY SCREEN: CPT | Performed by: NURSE PRACTITIONER

## 2024-08-15 PROCEDURE — 87340 HEPATITIS B SURFACE AG IA: CPT | Performed by: HOSPITALIST

## 2024-08-15 PROCEDURE — G0432 EIA HIV-1/HIV-2 SCREEN: HCPCS | Performed by: HOSPITALIST

## 2024-08-15 PROCEDURE — 82948 REAGENT STRIP/BLOOD GLUCOSE: CPT

## 2024-08-15 PROCEDURE — P9037 PLATE PHERES LEUKOREDU IRRAD: HCPCS

## 2024-08-15 PROCEDURE — 25010000002 HYDROCORTISONE SOD SUC (PF) 100 MG RECONSTITUTED SOLUTION: Performed by: INTERNAL MEDICINE

## 2024-08-15 PROCEDURE — 86900 BLOOD TYPING SEROLOGIC ABO: CPT | Performed by: NURSE PRACTITIONER

## 2024-08-15 PROCEDURE — 85610 PROTHROMBIN TIME: CPT | Performed by: NURSE PRACTITIONER

## 2024-08-15 PROCEDURE — 80202 ASSAY OF VANCOMYCIN: CPT | Performed by: HOSPITALIST

## 2024-08-15 PROCEDURE — 25010000002 PHYTONADIONE 10 MG/ML SOLUTION 1 ML AMPULE: Performed by: NURSE PRACTITIONER

## 2024-08-15 PROCEDURE — 99233 SBSQ HOSP IP/OBS HIGH 50: CPT | Performed by: HOSPITALIST

## 2024-08-15 PROCEDURE — 82820 HEMOGLOBIN-OXYGEN AFFINITY: CPT

## 2024-08-15 PROCEDURE — 63710000001 INSULIN LISPRO (HUMAN) PER 5 UNITS: Performed by: HOSPITALIST

## 2024-08-15 PROCEDURE — 94799 UNLISTED PULMONARY SVC/PX: CPT

## 2024-08-15 PROCEDURE — 80053 COMPREHEN METABOLIC PANEL: CPT | Performed by: HOSPITALIST

## 2024-08-15 PROCEDURE — 36430 TRANSFUSION BLD/BLD COMPNT: CPT

## 2024-08-15 PROCEDURE — 86923 COMPATIBILITY TEST ELECTRIC: CPT

## 2024-08-15 PROCEDURE — 86901 BLOOD TYPING SEROLOGIC RH(D): CPT | Performed by: NURSE PRACTITIONER

## 2024-08-15 PROCEDURE — 25010000002 LABETALOL 5 MG/ML SOLUTION: Performed by: NURSE PRACTITIONER

## 2024-08-15 PROCEDURE — 85007 BL SMEAR W/DIFF WBC COUNT: CPT | Performed by: HOSPITALIST

## 2024-08-15 PROCEDURE — 25010000002 PIPERACILLIN SOD-TAZOBACTAM PER 1 G: Performed by: HOSPITALIST

## 2024-08-15 PROCEDURE — 86900 BLOOD TYPING SEROLOGIC ABO: CPT

## 2024-08-15 PROCEDURE — 94664 DEMO&/EVAL PT USE INHALER: CPT

## 2024-08-15 PROCEDURE — 25010000002 HYDRALAZINE PER 20 MG: Performed by: INTERNAL MEDICINE

## 2024-08-15 PROCEDURE — 84100 ASSAY OF PHOSPHORUS: CPT | Performed by: NURSE PRACTITIONER

## 2024-08-15 PROCEDURE — 94761 N-INVAS EAR/PLS OXIMETRY MLT: CPT

## 2024-08-15 RX ORDER — HYDRALAZINE HYDROCHLORIDE 50 MG/1
25 TABLET, FILM COATED ORAL EVERY 8 HOURS SCHEDULED
Status: DISCONTINUED | OUTPATIENT
Start: 2024-08-15 | End: 2024-08-15

## 2024-08-15 RX ORDER — POTASSIUM CHLORIDE 1.5 G/1.58G
40 POWDER, FOR SOLUTION ORAL ONCE
Status: COMPLETED | OUTPATIENT
Start: 2024-08-15 | End: 2024-08-15

## 2024-08-15 RX ORDER — LABETALOL HYDROCHLORIDE 5 MG/ML
10 INJECTION, SOLUTION INTRAVENOUS EVERY 4 HOURS PRN
Status: DISCONTINUED | OUTPATIENT
Start: 2024-08-15 | End: 2024-08-21 | Stop reason: HOSPADM

## 2024-08-15 RX ORDER — MAGNESIUM SULFATE 1 G/100ML
1 INJECTION INTRAVENOUS
Status: COMPLETED | OUTPATIENT
Start: 2024-08-15 | End: 2024-08-15

## 2024-08-15 RX ORDER — METOPROLOL TARTRATE 25 MG/1
25 TABLET, FILM COATED ORAL EVERY 12 HOURS SCHEDULED
Status: DISCONTINUED | OUTPATIENT
Start: 2024-08-15 | End: 2024-08-18

## 2024-08-15 RX ADMIN — Medication 10 ML: at 08:15

## 2024-08-15 RX ADMIN — LEVOTHYROXINE SODIUM 100 MCG: 0.1 TABLET ORAL at 08:14

## 2024-08-15 RX ADMIN — Medication 10 ML: at 20:20

## 2024-08-15 RX ADMIN — INSULIN LISPRO 2 UNITS: 100 INJECTION, SOLUTION INTRAVENOUS; SUBCUTANEOUS at 08:15

## 2024-08-15 RX ADMIN — HYDROCORTISONE SODIUM SUCCINATE 50 MG: 100 INJECTION, POWDER, FOR SOLUTION INTRAMUSCULAR; INTRAVENOUS at 00:42

## 2024-08-15 RX ADMIN — HYDROCORTISONE SODIUM SUCCINATE 50 MG: 100 INJECTION, POWDER, FOR SOLUTION INTRAMUSCULAR; INTRAVENOUS at 17:25

## 2024-08-15 RX ADMIN — FLUOXETINE HYDROCHLORIDE 10 MG: 10 CAPSULE ORAL at 08:14

## 2024-08-15 RX ADMIN — LACOSAMIDE 50 MG: 50 TABLET, FILM COATED ORAL at 08:14

## 2024-08-15 RX ADMIN — HYDROCORTISONE SODIUM SUCCINATE 50 MG: 100 INJECTION, POWDER, FOR SOLUTION INTRAMUSCULAR; INTRAVENOUS at 06:11

## 2024-08-15 RX ADMIN — HYDRALAZINE HYDROCHLORIDE 10 MG: 20 INJECTION INTRAMUSCULAR; INTRAVENOUS at 06:11

## 2024-08-15 RX ADMIN — OXYCODONE HYDROCHLORIDE 5 MG: 5 TABLET ORAL at 08:17

## 2024-08-15 RX ADMIN — TRAZODONE HYDROCHLORIDE 25 MG: 50 TABLET ORAL at 20:13

## 2024-08-15 RX ADMIN — INSULIN LISPRO 3 UNITS: 100 INJECTION, SOLUTION INTRAVENOUS; SUBCUTANEOUS at 17:25

## 2024-08-15 RX ADMIN — INSULIN LISPRO 4 UNITS: 100 INJECTION, SOLUTION INTRAVENOUS; SUBCUTANEOUS at 20:25

## 2024-08-15 RX ADMIN — MAGNESIUM SULFATE HEPTAHYDRATE 1 G: 1 INJECTION, SOLUTION INTRAVENOUS at 11:51

## 2024-08-15 RX ADMIN — POTASSIUM CHLORIDE 40 MEQ: 1.5 POWDER, FOR SOLUTION ORAL at 11:51

## 2024-08-15 RX ADMIN — LACOSAMIDE 50 MG: 50 TABLET, FILM COATED ORAL at 20:13

## 2024-08-15 RX ADMIN — MUPIROCIN 1 APPLICATION: 20 OINTMENT TOPICAL at 08:14

## 2024-08-15 RX ADMIN — OXYCODONE HYDROCHLORIDE 5 MG: 5 TABLET ORAL at 17:48

## 2024-08-15 RX ADMIN — HYDROCORTISONE SODIUM SUCCINATE 50 MG: 100 INJECTION, POWDER, FOR SOLUTION INTRAMUSCULAR; INTRAVENOUS at 11:52

## 2024-08-15 RX ADMIN — LABETALOL HYDROCHLORIDE 10 MG: 5 INJECTION, SOLUTION INTRAVENOUS at 04:52

## 2024-08-15 RX ADMIN — PIPERACILLIN SODIUM AND TAZOBACTAM SODIUM 3.38 G: 3; .375 INJECTION, POWDER, LYOPHILIZED, FOR SOLUTION INTRAVENOUS at 08:14

## 2024-08-15 RX ADMIN — FOLIC ACID 1 MG: 1 TABLET ORAL at 08:14

## 2024-08-15 RX ADMIN — METOPROLOL TARTRATE 25 MG: 25 TABLET, FILM COATED ORAL at 12:44

## 2024-08-15 RX ADMIN — MAGNESIUM SULFATE HEPTAHYDRATE 1 G: 1 INJECTION, SOLUTION INTRAVENOUS at 13:49

## 2024-08-15 RX ADMIN — HYDROCORTISONE SODIUM SUCCINATE 50 MG: 100 INJECTION, POWDER, FOR SOLUTION INTRAMUSCULAR; INTRAVENOUS at 23:59

## 2024-08-15 RX ADMIN — PIPERACILLIN SODIUM AND TAZOBACTAM SODIUM 3.38 G: 3; .375 INJECTION, POWDER, LYOPHILIZED, FOR SOLUTION INTRAVENOUS at 20:20

## 2024-08-15 RX ADMIN — MAGNESIUM SULFATE HEPTAHYDRATE 1 G: 1 INJECTION, SOLUTION INTRAVENOUS at 12:44

## 2024-08-15 RX ADMIN — PHYTONADIONE 10 MG: 10 INJECTION, EMULSION INTRAMUSCULAR; INTRAVENOUS; SUBCUTANEOUS at 00:42

## 2024-08-15 RX ADMIN — PANTOPRAZOLE SODIUM 40 MG: 40 TABLET, DELAYED RELEASE ORAL at 08:14

## 2024-08-15 RX ADMIN — FERROUS SULFATE TAB 325 MG (65 MG ELEMENTAL FE) 325 MG: 325 (65 FE) TAB at 08:14

## 2024-08-15 RX ADMIN — HYDRALAZINE HYDROCHLORIDE 10 MG: 20 INJECTION INTRAMUSCULAR; INTRAVENOUS at 00:05

## 2024-08-15 RX ADMIN — METOPROLOL TARTRATE 25 MG: 25 TABLET, FILM COATED ORAL at 20:13

## 2024-08-15 RX ADMIN — MUPIROCIN 1 APPLICATION: 20 OINTMENT TOPICAL at 20:20

## 2024-08-15 RX ADMIN — HYDRALAZINE HYDROCHLORIDE 10 MG: 20 INJECTION INTRAMUSCULAR; INTRAVENOUS at 17:25

## 2024-08-15 NOTE — PROGRESS NOTES
PROGRESS NOTE         Patient Identification:  Name:  Reny Elena  Age:  64 y.o.  Sex:  female  :  1960  MRN:  [id number]  Visit Number:  [id number]  Primary Care Provider:  Susan Stephens APRN         LOS: 12 days       ----------------------------------------------------------------------------------------------------------------------  Subjective       Chief Complaints:    Vomiting        Interval History:      Patient resting in bed this morning.  Awake and alert.  Primary RN at bedside.  Currently on room air with no apparent distress.  Currently receiving blood transfusion.  Lung sounds diminished bilaterally.  Ecchymosis to bilateral upper extremities.  3+ bilateral upper extremity edema.  WBC normal at 4.31.  Peritoneal body fluid culture shows no growth thus far.    Review of Systems:    EMMANUELLE due to confusion  ----------------------------------------------------------------------------------------------------------------------      Objective       Current Hospital Meds:  [Held by provider] atorvastatin, 80 mg, Oral, Nightly  ferrous sulfate, 325 mg, Oral, Daily With Breakfast  FLUoxetine, 10 mg, Oral, Daily  folic acid, 1 mg, Oral, Daily  hydrocortisone sodium succinate, 50 mg, Intravenous, Q6H  insulin lispro, 2-7 Units, Subcutaneous, 4x Daily AC & at Bedtime  lacosamide, 50 mg, Oral, Q12H  levothyroxine, 100 mcg, Oral, QAM AC  magnesium sulfate, 1 g, Intravenous, Q1H  metoprolol tartrate, 25 mg, Oral, Q12H  mupirocin, 1 Application, Topical, Q12H  pantoprazole, 40 mg, Oral, QAM AC  piperacillin-tazobactam, 3.375 g, Intravenous, Q12H  sodium chloride, 1,000 mL, Intravenous, Once  sodium chloride, 10 mL, Intravenous, Q12H  sodium chloride, 10 mL, Intravenous, Q12H  sodium chloride, 10 mL, Intravenous, Q12H  sodium chloride, 10 mL, Intravenous, Q12H  sodium chloride, 10 mL, Intravenous, Q12H  traZODone, 25 mg, Oral, Nightly  Vancomycin Pharmacy Intermittent/Pulse Dosing, , Does not  apply, Daily      Pharmacy Consult - Pharmacy to dose,   Pharmacy Consult - Pharmacy to dose,         ----------------------------------------------------------------------------------------------------------------------    Vital Signs:  Temp:  [96.8 °F (36 °C)-98.1 °F (36.7 °C)] 97.7 °F (36.5 °C)  Heart Rate:  [] 85  Resp:  [10-18] 10  BP: (148-188)/() 181/103  Mean Arterial Pressure (Non-Invasive) for the past 24 hrs (Last 3 readings):   Noninvasive MAP (mmHg)   08/15/24 1230 146   08/15/24 1200 147   08/15/24 1130 146       SpO2 Percentage    08/15/24 1200 08/15/24 1215 08/15/24 1230   SpO2: 98% 98% 98%     SpO2:  [94 %-100 %] 98 %  on   ;   Device (Oxygen Therapy): room air    Body mass index is 17.81 kg/m².  Wt Readings from Last 3 Encounters:   08/15/24 47.1 kg (103 lb 13.4 oz)   07/26/24 45.4 kg (100 lb)   07/26/24 51.7 kg (114 lb)        Intake/Output Summary (Last 24 hours) at 8/15/2024 1249  Last data filed at 8/15/2024 1008  Gross per 24 hour   Intake 3408 ml   Output 994 ml   Net 2414 ml     Diet: Regular/House; Feeding Assistance - Nursing; Texture: Pureed (NDD 1); Fluid Consistency: Thin (IDDSI 0)  ----------------------------------------------------------------------------------------------------------------------      Physical Exam:    Constitutional: Frail elderly female resting quietly in bed.  Awake and alert.  Primary RN at bedside.  HENT:  Head: Normocephalic and atraumatic.  Mouth:  Moist mucous membranes.    Eyes:  Conjunctivae and EOM are normal.  No scleral icterus.  Neck:  Neck supple.  No JVD present.    Cardiovascular:  Normal rate, regular rhythm and normal heart sounds with no murmur. No edema.  Pulmonary/Chest: Diminished to auscultation bilaterally no respiratory distress, no wheezes, no crackles, with normal breath sounds and good air movement.  Abdominal:  peritoneal dialysis catheter. Soft.  Bowel sounds are normal.  No distension   Musculoskeletal: Right AKA stump with  staples in place, no surrounding erythema or drainage.  Neurological:  Alert and oriented to person, place, and time.  No facial droop.  No slurred speech.    Skin: Bleeding noted to left IJ central line insertion site.  Right AKA stump is dressed, clean dry and intact      ----------------------------------------------------------------------------------------------------------------------  Results from last 7 days   Lab Units 08/11/24  0744   CK TOTAL U/L 90         Results from last 7 days   Lab Units 08/14/24  0503   PH, ARTERIAL pH units 7.411   PO2 ART mm Hg 72.0*   PCO2, ARTERIAL mm Hg 34.6*   HCO3 ART mmol/L 21.9     Results from last 7 days   Lab Units 08/15/24  1116 08/15/24  0901 08/14/24  2314 08/14/24  1521 08/14/24  0702 08/13/24  2329 08/13/24  2154 08/13/24  0616 08/13/24  0005 08/13/24  0004 08/12/24  1918 08/10/24  0528 08/09/24  0049   CRP mg/dL  --   --   --   --   --   --   --   --   --   --  1.65*  --   --    LACTATE mmol/L  --   --   --   --   --   --  1.5 2.5* 3.5*  --   --    < >  --    WBC 10*3/mm3 4.07 3.97 4.31  --  5.70   < >  --   --   --    < >  --    < > 5.34   HEMOGLOBIN g/dL 11.2* 11.4* 6.3*  --  9.2*   < >  --   --   --    < >  --    < > 9.5*   HEMATOCRIT % 33.7* 33.6* 18.9*  --  26.8*   < >  --   --   --    < >  --    < > 31.6*   MCV fL 88.9 85.1 93.1  --  92.7   < >  --   --   --    < >  --    < > 102.3*   MCHC g/dL 33.2 33.9 33.3  --  34.3   < >  --   --   --    < >  --    < > 30.1*   PLATELETS 10*3/mm3 64* 61* 79*  --  104*   < >  --   --   --    < >  --    < > 149   INR   --  0.88* 1.13* 1.20* 1.46*  --  2.13*  --   --   --   --   --  0.96    < > = values in this interval not displayed.     Results from last 7 days   Lab Units 08/15/24  0901 08/14/24  0702 08/13/24  2154 08/13/24  0004 08/12/24  0312 08/11/24  1252 08/11/24  0744   SODIUM mmol/L 137 134* 134*   < > 136   < > 135*   POTASSIUM mmol/L 3.1* 3.7 3.8   < > 3.3*   < > 3.5   MAGNESIUM mg/dL 1.3*  --   --   --  1.3*   "--  1.3*   CHLORIDE mmol/L 98 100 102   < > 104   < > 105   CO2 mmol/L 20.9* 18.5* 17.9*   < > 15.9*   < > 18.9*   BUN mg/dL 23 21 20   < > 18   < > 18   CREATININE mg/dL 2.54* 3.03* 2.96*   < > 2.85*   < > 3.12*   CALCIUM mg/dL 7.4* 6.8* 6.6*   < > 5.7*   < > 6.0*   GLUCOSE mg/dL 163* 136* 140*   < > 229*   < > 62*   ALBUMIN g/dL 2.9* 2.2* 2.0*   < > 1.4*   < > 1.6*   BILIRUBIN mg/dL 1.2 0.9 0.5   < > 0.3   < > 0.3   ALK PHOS U/L 422* 433* 451*   < > 196*   < > 182*   AST (SGOT) U/L 400* 1,095* 1,449*   < > 2,747*   < > 965*   ALT (SGPT) U/L 32 73* 98*   < > 322*   < > 147*    < > = values in this interval not displayed.   Estimated Creatinine Clearance: 16.2 mL/min (A) (by C-G formula based on SCr of 2.54 mg/dL (H)).  No results found for: \"AMMONIA\"      Glucose   Date/Time Value Ref Range Status   08/15/2024 1142 145 (H) 70 - 130 mg/dL Final   08/15/2024 0801 152 (H) 70 - 130 mg/dL Final   08/14/2024 2042 204 (H) 70 - 130 mg/dL Final   08/14/2024 1642 175 (H) 70 - 130 mg/dL Final   08/14/2024 1040 149 (H) 70 - 130 mg/dL Final   08/14/2024 0701 155 (H) 70 - 130 mg/dL Final   08/13/2024 2124 150 (H) 70 - 130 mg/dL Final   08/13/2024 1650 173 (H) 70 - 130 mg/dL Final     Lab Results   Component Value Date    HGBA1C 6.10 (H) 08/02/2024     Lab Results   Component Value Date    TSH 3.580 08/13/2024    FREET4 1.08 08/14/2024       Blood Culture   Date Value Ref Range Status   08/02/2024 No growth at 24 hours  Preliminary   08/02/2024 No growth at 24 hours  Preliminary     No results found for: \"URINECX\"  No results found for: \"WOUNDCX\"  No results found for: \"STOOLCX\"  No results found for: \"RESPCX\"  Pain Management Panel  More data exists         Latest Ref Rng & Units 7/21/2024 4/28/2024   Pain Management Panel   Amphetamine, Urine Qual Negative Negative  Negative    Barbiturates Screen, Urine Negative Negative  Negative    Benzodiazepine Screen, Urine Negative Negative  Negative    Buprenorphine, Screen, Urine " Negative Negative  Negative    Cocaine Screen, Urine Negative Negative  Negative    Fentanyl, Urine Negative Negative  Negative    Methadone Screen , Urine Negative Negative  Negative    Methamphetamine, Ur Negative Negative  Negative       Details                     ----------------------------------------------------------------------------------------------------------------------  Imaging Results (Last 24 Hours)       ** No results found for the last 24 hours. **            ----------------------------------------------------------------------------------------------------------------------    Pertinent Infectious Disease Results    Thank you Dr. Crawford for allowing us to participate in the care of your patient.  As you well know, Ms. Reny Elena is a 64 y.o. female with past medical history significant for seizure disorder, type 2 diabetes, arthritis, CHF, hypothyroidism, hypertension, hyperlipidemia, GERD, PAD, ESRD, iron deficiency anemia,, who presented to [facility] Emergency Department on 8/2/2024 for nausea, vomiting, diarrhea and worsening pain of right diabetic foot wound.  Patient was recently seen in our ED on 7/21/2024 with complaints of hallucinations, fever, and right foot pain.  At this time, patient was diagnosed with right heel ulcer and UTI.  She was sent home with an antibiotic but was unsure of the name.  Patient reported she started having nausea and vomiting after taking the antibiotics so she stopped taking them.  However, she continued to have nausea and vomiting.       Patient was tachycardic in the ED.Labs show K+ at upper limit of normal, BUN 30, cr 4.36, glucose 176, alk phos 323 and albumin 2.9, otherwise normal LFTs. CRP mildly elevated at 2.34, while lactate and WBC are normal. UA shows large leuk esterase and unable to determine RBC, WBC, bacteria of squamous cells due to loaded field.  Patient's urine culture from 7/21/2024 grew E. coli.  She had also been  following with wound care who it obtained a wound culture on the same day.  This culture finalized with mixed gram-negative maurisio and light growth of Enterococcus faecalis.       X-ray of the right foot performed on 8/2/2024 revealed partial amputation of the fifth metatarsal. Bony demineralization. No acute fracture or dislocation. No lytic or blastic bony lesions seen. Diffuse interphalangeal joint space loss. Mild degenerative changes in the midfoot. No cortical erosion. Fixation timur in the tibia/talus/calcaneus noted. Diffuse soft tissue swelling. Vascular calcifications. No soft tissue gas.  CT abdomen/pelvis revealed peritoneal dialysis catheter noted in the anterior right abdominal wall with the catheter noted to terminate in the left lateral pelvis. Small volume of free fluid in the right upper quadrant and lower posterior pelvis and right lower quadrant. Slightly elevated left hemidiaphragm. No bowel or renal obstruction. No abdominal aortic aneurysm. No features of acute appendicitis. Stranding identified in the presacral space could present changes related to mild distal colitis. Fluid-filled bladder with small volume of air in the bladder lumen. No pneumatosis. No conclusive features of free air. Distended configuration to the gallbladder likely due to fasting. No conclusive features of gastritis or enteritis.       Patient continued to have nausea and vomiting after 2 doses of Zofran.  She was admitted for further treatment.  Subsequently, patient was started on IV Rocephin in the setting of E. coli UTI.  Flagyl was added to cover possible colitis.  Patient was also started on IV vancomycin for Enterococcus faecalis wound infection.  General surgery consulted for possible debridement of patient's wound.     Assessment/Plan       Assessment     R foot wound infection, Enterococcus faecalis  Possible osteomyelitis of R foot  Hardware present in RLE  E. Coli UTI  Possible colitis         Plan      Patient  resting in bed this morning.  Awake and alert.  Primary RN at bedside.  Currently on room air with no apparent distress.  Currently receiving blood transfusion.  Lung sounds diminished bilaterally.  Ecchymosis to bilateral upper extremities.  3+ bilateral upper extremity edema.  WBC normal at 4.31.  Peritoneal body fluid culture shows no growth thus far.    For now in the setting of possible new onset pneumonia and/or possible acute cholecystitis, recommend continuing Zosyn and vancomycin courses.  We will continue to monitor closely and adjust antibiotic coverage as appropriate.      ANTIMICROBIAL THERAPY    cefdinir - 300 MG  piperacillin-tazobactam (ZOSYN) IVPB 3.375 g IVPB in 100 mL NS (VTB)  Vancomycin Pharmacy Intermittent/Pulse Dosing     Code Status:   Code Status and Medical Interventions: No CPR (Do Not Attempt to Resuscitate); Limited Support; No intubation (DNI)   Ordered at: 08/04/24 1645     Medical Intervention Limits:    No intubation (DNI)     Code Status (Patient has no pulse and is not breathing):    No CPR (Do Not Attempt to Resuscitate)     Medical Interventions (Patient has pulse or is breathing):    Limited Support       DARIUS Moreno  08/15/24  12:49 EDT

## 2024-08-15 NOTE — PROGRESS NOTES
[facility] HOSPITALIST PROGRESS NOTE     Patient Identification:  Name:  Reny Elena  Age:  64 y.o.  Sex:  female  :  1960  MRN:  [id number]  Visit Number:  [id number]  Primary Care Provider:  Susan Stephens APRN    Length of stay:  12    Subjective: Patient seen and examined assisted by GLORIA Dunham.  Patient is awake conversant, blood pressure is elevated, tolerated dialysis yesterday, bleeding at the triple-lumen catheter site has slowed down, she did receive the third unit packed RBC, 1 unit of platelet and 2 FFP.  She also empirically received  Trancenamic acid x 1 dose.  This morning patient is eating better, she is not confused.  LFTs improving, platelets further decreased, workup not suggestive of DIC.      Chief Complaint: Septic shock  ----------------------------------------------------------------------------------------------------------------------  Current Hospital Meds:  [Held by provider] atorvastatin, 80 mg, Oral, Nightly  ferrous sulfate, 325 mg, Oral, Daily With Breakfast  FLUoxetine, 10 mg, Oral, Daily  folic acid, 1 mg, Oral, Daily  hydrALAZINE, 25 mg, Oral, Q8H  hydrocortisone sodium succinate, 50 mg, Intravenous, Q6H  insulin lispro, 2-7 Units, Subcutaneous, 4x Daily AC & at Bedtime  lacosamide, 50 mg, Oral, Q12H  levothyroxine, 100 mcg, Oral, QAM AC  magnesium sulfate, 1 g, Intravenous, Q1H  mupirocin, 1 Application, Topical, Q12H  pantoprazole, 40 mg, Oral, QAM AC  piperacillin-tazobactam, 3.375 g, Intravenous, Q12H  sodium chloride, 1,000 mL, Intravenous, Once  sodium chloride, 10 mL, Intravenous, Q12H  sodium chloride, 10 mL, Intravenous, Q12H  sodium chloride, 10 mL, Intravenous, Q12H  sodium chloride, 10 mL, Intravenous, Q12H  sodium chloride, 10 mL, Intravenous, Q12H  traZODone, 25 mg, Oral, Nightly  Vancomycin Pharmacy Intermittent/Pulse Dosing, , Does not apply, Daily      Pharmacy Consult - Pharmacy to dose,   Pharmacy Consult - Pharmacy to dose,        ----------------------------------------------------------------------------------------------------------------------  Vital Signs:  Temp:  [96.8 °F (36 °C)-98.1 °F (36.7 °C)] 98.1 °F (36.7 °C)  Heart Rate:  [] 92  Resp:  [10-18] 10  BP: (148-188)/() 148/77       Tele: Sinus rhythm 92 bpm      08/13/24  0000 08/14/24  0630 08/15/24  0500   Weight: 42.5 kg (93 lb 12.8 oz) 46.5 kg (102 lb 8.2 oz) 47.1 kg (103 lb 13.4 oz)     Body mass index is 17.81 kg/m².    Intake/Output Summary (Last 24 hours) at 8/15/2024 1157  Last data filed at 8/15/2024 1008  Gross per 24 hour   Intake 3408 ml   Output 994 ml   Net 2414 ml     Diet: Regular/House; Feeding Assistance - Nursing; Texture: Pureed (NDD 1); Fluid Consistency: Thin (IDDSI 0)  ----------------------------------------------------------------------------------------------------------------------  Physical exam:  General: Chronically ill-appearing, awake and alert, answers questions appropriately, anasarca.   No respiratory distress.    Skin:  Skin is warm and dry. No rash noted. No pallor.    HENT:  Head:  Normocephalic and atraumatic.  Mouth:  Moist mucous membranes.  Edentulous  Eyes:  Conjunctivae and EOM are normal.  Pupils are equal, round, and reactive to light.  No scleral icterus.    Neck:  Neck supple.  No JVD present.    Pulmonary/Chest: Left subclavian line triple-lumen catheter, dried blood surrounding the entry site.  Good air movement equal chest expansion   cardiovascular:  Normal rate, regular rhythm and normal heart sounds with no murmur.  Abdominal: PD catheter in place soft.  Bowel sounds are normal.  No distension and no tenderness.   Extremities: Severe pitting edema +3 with anasarca generalized edema.  No cyanosis.    Neurological:  Motor strength equal no obvious deficit, sensory grossly intact.   No cranial nerve deficit.  No tongue deviation.  No facial droop.  No slurred speech.    Genitourinary: No Doshi  catheter  Back:  ----------------------------------------------------------------------------------------------------------------------  ----------------------------------------------------------------------------------------------------------------------  Results from last 7 days   Lab Units 08/11/24  0744   CK TOTAL U/L 90     Results from last 7 days   Lab Units 08/15/24  1116 08/15/24  0901 08/14/24  2314 08/14/24  1521 08/14/24  0702 08/13/24  2329 08/13/24  2154 08/13/24  0616 08/13/24  0005 08/13/24  0004 08/12/24  1918 08/10/24  0528 08/09/24  0049   CRP mg/dL  --   --   --   --   --   --   --   --   --   --  1.65*  --   --    LACTATE mmol/L  --   --   --   --   --   --  1.5 2.5* 3.5*  --   --    < >  --    WBC 10*3/mm3 4.07 3.97 4.31  --  5.70   < >  --   --   --    < >  --    < > 5.34   HEMOGLOBIN g/dL 11.2* 11.4* 6.3*  --  9.2*   < >  --   --   --    < >  --    < > 9.5*   HEMATOCRIT % 33.7* 33.6* 18.9*  --  26.8*   < >  --   --   --    < >  --    < > 31.6*   MCV fL 88.9 85.1 93.1  --  92.7   < >  --   --   --    < >  --    < > 102.3*   MCHC g/dL 33.2 33.9 33.3  --  34.3   < >  --   --   --    < >  --    < > 30.1*   PLATELETS 10*3/mm3 64* 61* 79*  --  104*   < >  --   --   --    < >  --    < > 149   INR   --  0.88* 1.13* 1.20* 1.46*  --  2.13*  --   --   --   --   --  0.96    < > = values in this interval not displayed.     Results from last 7 days   Lab Units 08/14/24  0503   PH, ARTERIAL pH units 7.411   PO2 ART mm Hg 72.0*   PCO2, ARTERIAL mm Hg 34.6*   HCO3 ART mmol/L 21.9     Results from last 7 days   Lab Units 08/15/24  0901 08/14/24  0702 08/13/24  2154 08/13/24  0004 08/12/24  0312 08/11/24  1252 08/11/24  0744   SODIUM mmol/L 137 134* 134*   < > 136   < > 135*   POTASSIUM mmol/L 3.1* 3.7 3.8   < > 3.3*   < > 3.5   MAGNESIUM mg/dL 1.3*  --   --   --  1.3*  --  1.3*   CHLORIDE mmol/L 98 100 102   < > 104   < > 105   CO2 mmol/L 20.9* 18.5* 17.9*   < > 15.9*   < > 18.9*   BUN mg/dL 23 21 20   < > 18   " < > 18   CREATININE mg/dL 2.54* 3.03* 2.96*   < > 2.85*   < > 3.12*   CALCIUM mg/dL 7.4* 6.8* 6.6*   < > 5.7*   < > 6.0*   GLUCOSE mg/dL 163* 136* 140*   < > 229*   < > 62*   ALBUMIN g/dL 2.9* 2.2* 2.0*   < > 1.4*   < > 1.6*   BILIRUBIN mg/dL 1.2 0.9 0.5   < > 0.3   < > 0.3   ALK PHOS U/L 422* 433* 451*   < > 196*   < > 182*   AST (SGOT) U/L 400* 1,095* 1,449*   < > 2,747*   < > 965*   ALT (SGPT) U/L 32 73* 98*   < > 322*   < > 147*    < > = values in this interval not displayed.   Estimated Creatinine Clearance: 16.2 mL/min (A) (by C-G formula based on SCr of 2.54 mg/dL (H)).    No results found for: \"AMMONIA\"      Blood Culture   Date Value Ref Range Status   08/12/2024 No growth at 2 days  Preliminary     No results found for: \"URINECX\"  No results found for: \"WOUNDCX\"  No results found for: \"STOOLCX\"    I have personally looked at the labs and they are summarized above.  ----------------------------------------------------------------------------------------------------------------------  Imaging Results (Last 24 Hours)       ** No results found for the last 24 hours. 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  -Severe debility  -Severe malnutrition with severe hypoalbuminemia  -Diabetes type 2 Accu-Chek at goal  -Chronic anemia with acute blood loss anemia total packed RBC transfused 4 units since admission  -Coagulopathy multifactorial from sepsis, vitamin K, in the setting of end-stage renal disease, status post 2 FFP 1 platelet  -Acute thrombocytopenia multifactorial secondary to sepsis, medication, delusional post packed RBC transfusion, HIT workup pending  -Anorexia improved  -Ischemic hepatitis improved  -Sciatica secondary to hypoalbuminemia  -History of essential hypertension  -Hypokalemia    Patient's appetite improved mentation " improved, bleeding from the subclavian central line appears to be improving, surgery has been consulted to assess.  Discussed with nephrology patient will continue with peritoneal dialysis, will discontinue bicarb containing IV fluids.  Supportive care, antibiotic Zosyn and vancomycin, infectious disease and pulmonary critical care service assistance appreciated.  Will discuss with pulmonology hydrocortisone can be tapered.  Replace electrolytes    Plan discussed with patient and agreed.    Case discussed with nephrology, GLORIA Dunham.    Total time spent in this encounter is 35 minutes.    Disposition pending      Rebeka Woodruff MD  08/15/24  11:57 EDT

## 2024-08-15 NOTE — PROGRESS NOTES
Pulmonary and critical care progress note    Chief complaint -generalized fatigue      Subjective-overnight events reviewed.  All the labs medications ins and outs and vitals reviewed.    Bleeding has improved.  Blood pressure on the higher side.  Will discontinue midodrine and start antihypertensive medications.  After I gave transonic acid yesterday bleeding slowed down but happened again overnight.  If it really happens today we will repeat the dose.      Review of Systems  No changes        History  Past Medical History:   Diagnosis Date    Arthritis     Closed fracture of right fibula and tibia 2018    Depression     Diabetic ulcer of left foot associated with type 2 diabetes mellitus 2017    Diastolic dysfunction     Disease of thyroid gland     Elevated cholesterol     End stage renal disease     Essential hypertension     GERD (gastroesophageal reflux disease)     History of transfusion     Hyperlipidemia     Iron deficiency anemia 2018    PAD (peripheral artery disease)     Renal failure     Type 2 diabetes mellitus with hyperglycemia, with long-term current use of insulin 2018   ,   Past Surgical History:   Procedure Laterality Date    ABDOMINAL SURGERY      ABOVE KNEE AMPUTATION Right 2024    Procedure: AMPUTATION ABOVE KNEE;  Surgeon: Angelo Santoro MD;  Location: [facility];  Service: General;  Laterality: Right;    BACK SURGERY      Post spinal diskectomy, osteophytectomy in one lumbar interspace    CATARACT EXTRACTION      Left      SECTION      DENTAL PROCEDURE      ENDOSCOPY      FRACTURE SURGERY      right leg    HYSTERECTOMY      INCISION AND DRAINAGE LEG Left 4/15/2017    Procedure: INCISION AND DRAINAGE LOWER EXTREMITY;  Surgeon: Basilio Morris MD;  Location: [facility];  Service:     INCISION AND DRAINAGE LEG Left 2017    Procedure: Irrigation and Debridment abcess diabetic wound left foot with deep culture;  Surgeon: Basilio Morris MD;  Location:   [facility];  Service:     INSERTION PERITONEAL DIALYSIS CATHETER N/A 12/16/2021    Procedure: INSERTION PERITONEAL DIALYSIS CATHETER LAPAROSCOPIC;  Surgeon: Edy Glover MD;  Location:  [facility];  Service: General;  Laterality: N/A;    KNEE ARTHROSCOPY Right     ORIF TIBIA FRACTURE Right 12/28/2018    Procedure: TIBIAL  FRACTURE OPEN REDUCTION INTERNAL FIXATION;  Surgeon: Jung Barragan MD;  Location:  [facility];  Service: Orthopedics    TOE AMPUTATION Right     5th    TUBAL ABDOMINAL LIGATION     ,   Family History   Problem Relation Age of Onset    Heart disease Mother     Cancer Mother     COPD Mother     Diabetes Other     Depression Other    ,   Social History     Tobacco Use    Smoking status: Never     Passive exposure: Never    Smokeless tobacco: Never   Vaping Use    Vaping status: Never Used   Substance Use Topics    Alcohol use: No    Drug use: No   ,   Medications Prior to Admission   Medication Sig Dispense Refill Last Dose    aspirin 81 MG EC tablet Take 1 tablet by mouth Daily.   8/2/2024    cefdinir (OMNICEF) 300 MG capsule Take 1 capsule by mouth Every Other Day.   8/2/2024    ferrous sulfate 325 (65 FE) MG tablet Take 1 tablet by mouth Daily With Breakfast.   8/2/2024    FLUoxetine (PROzac) 40 MG capsule Take 1 capsule by mouth Daily.   8/2/2024    fluticasone (FLONASE) 50 MCG/ACT nasal spray 2 sprays into the nostril(s) as directed by provider Daily As Needed for Rhinitis.   Past Week    folic acid (FOLVITE) 1 MG tablet Take 1 tablet by mouth Daily.   8/2/2024    HYDROcodone-acetaminophen (NORCO)  MG per tablet Take 1 tablet by mouth 3 (Three) Times a Day As Needed for Moderate Pain.   8/2/2024    hydrOXYzine (ATARAX) 25 MG tablet Take 1 tablet by mouth 3 (Three) Times a Day As Needed for Anxiety. 15 tablet 0 8/2/2024    Insulin Aspart, w/Niacinamide, (Fiasp) 100 UNIT/ML solution Inject 2-7 Units as directed 4 (Four) Times a Day Before Meals & at Bedtime.   8/2/2024    lacosamide (VIMPAT) 50  MG tablet tablet Take 1 tablet by mouth Every 12 (Twelve) Hours.   8/2/2024    levothyroxine (SYNTHROID, LEVOTHROID) 100 MCG tablet Take 1 tablet by mouth Daily.   8/2/2024    lidocaine (LIDODERM) 5 % Place 1 patch on the skin as directed by provider Daily As Needed for Mild Pain. Remove & Discard patch within 12 hours or as directed by MD   Past Week    losartan (COZAAR) 50 MG tablet Take 1 tablet by mouth Daily.   8/2/2024    NIFEdipine XL (PROCARDIA XL) 90 MG 24 hr tablet Take 1 tablet by mouth Daily.   8/2/2024    ondansetron ODT (ZOFRAN-ODT) 4 MG disintegrating tablet Place 1 tablet on the tongue 3 (Three) Times a Day As Needed for Nausea or Vomiting.   Past Week    pantoprazole (PROTONIX) 40 MG EC tablet Take 1 tablet by mouth Daily.   8/2/2024    potassium chloride (KLOR-CON M20) 20 MEQ CR tablet Take 1 tablet by mouth 2 (Two) Times a Day.   8/2/2024    rOPINIRole (REQUIP) 0.5 MG tablet Take 1 tablet by mouth 2 (Two) Times a Day.   8/2/2024    tiZANidine (ZANAFLEX) 4 MG tablet Take 1 tablet by mouth Every 6 (Six) Hours As Needed for Muscle Spasms.   8/2/2024    atorvastatin (LIPITOR) 80 MG tablet Take 1 tablet by mouth Every Night. 30 tablet 0 8/1/2024    traZODone (DESYREL) 50 MG tablet Take 1 tablet by mouth Every Night.   8/1/2024   , Scheduled Meds:  [Held by provider] atorvastatin, 80 mg, Oral, Nightly  ferrous sulfate, 325 mg, Oral, Daily With Breakfast  FLUoxetine, 10 mg, Oral, Daily  folic acid, 1 mg, Oral, Daily  hydrocortisone sodium succinate, 50 mg, Intravenous, Q6H  insulin lispro, 2-7 Units, Subcutaneous, 4x Daily AC & at Bedtime  lacosamide, 50 mg, Oral, Q12H  levothyroxine, 100 mcg, Oral, QAM AC  midodrine, 7.5 mg, Oral, TID AC  mupirocin, 1 Application, Topical, Q12H  pantoprazole, 40 mg, Oral, QAM AC  piperacillin-tazobactam, 3.375 g, Intravenous, Q12H  sodium chloride, 1,000 mL, Intravenous, Once  sodium chloride, 10 mL, Intravenous, Q12H  sodium chloride, 10 mL, Intravenous, Q12H  sodium  chloride, 10 mL, Intravenous, Q12H  sodium chloride, 10 mL, Intravenous, Q12H  sodium chloride, 10 mL, Intravenous, Q12H  traZODone, 25 mg, Oral, Nightly  Vancomycin Pharmacy Intermittent/Pulse Dosing, , Does not apply, Daily    , Continuous Infusions:  Pharmacy Consult - Pharmacy to dose,   Pharmacy Consult - Pharmacy to dose,   sodium chloride 0.45 % 1,000 mL with sodium bicarbonate 8.4 % 75 mEq infusion, , Last Rate: 75 mL/hr at 08/14/24 2342     and Allergies:  Morphine, Penicillins, Codeine, and Sulfa antibiotics    Objective     Vital Signs   Temp:  [96.8 °F (36 °C)-98.1 °F (36.7 °C)] 98.1 °F (36.7 °C)  Heart Rate:  [] 94  Resp:  [10-18] 10  BP: (141-188)/() 162/85    Physical Exam:             General-chronically ill in appearance  HEENT-PERRLA    Neck-supple    Respiratory-respirations normal-on auscultation no wheezing no crackles,    Cardiovascular-NSR  GI-nontender nondistended bowel sounds positive    CNS-nonfocal    Musculoskeletal -no edema, right above-knee amputation      Psychiatric-alert awake oriented  Skin- no visible rash                                                                   Results Review:    LABS:    Lab Results   Component Value Date    GLUCOSE 136 (H) 08/14/2024    BUN 21 08/14/2024    CREATININE 3.03 (H) 08/14/2024    EGFRIFNONA 8 (L) 02/06/2022    EGFRIFAFRI  02/06/2022      Comment:      <15 Indicative of kidney failure.    BCR 6.9 (L) 08/14/2024    CO2 18.5 (L) 08/14/2024    CALCIUM 6.8 (L) 08/14/2024    PROTENTOTREF 6.1 12/08/2020    ALBUMIN 2.2 (L) 08/14/2024    LABIL2 1.2 12/08/2020    AST 1,095 (H) 08/14/2024    ALT 73 (H) 08/14/2024    WBC 4.31 08/14/2024    HGB 6.3 (C) 08/14/2024    HCT 18.9 (C) 08/14/2024    MCV 93.1 08/14/2024    PLT 79 (L) 08/14/2024     (L) 08/14/2024    K 3.7 08/14/2024     08/14/2024    ANIONGAP 15.5 (H) 08/14/2024       Lab Results   Component Value Date    INR 1.13 (H) 08/14/2024    INR 1.20 (H) 08/14/2024    INR 1.46 (H)  08/14/2024    PROTIME 14.6 08/14/2024    PROTIME 15.3 (H) 08/14/2024    PROTIME 17.8 (H) 08/14/2024       Results from last 7 days   Lab Units 08/14/24  2314 08/14/24  1521 08/14/24  0702 08/13/24  2154   INR  1.13* 1.20* 1.46* 2.13*   APTT seconds 43.3*  --  96.4* 146.4*          I reviewed the patient's new clinical results.  I reviewed the patient's new imaging results and agree with the interpretation.  Microbiology Results (last 10 days)       Procedure Component Value - Date/Time    Fungus Culture - Body Fluid, Peritoneal Catheter [[id number]] Collected: 08/13/24 1253    Lab Status: Preliminary result Specimen: Body Fluid from Peritoneal Catheter Updated: 08/14/24 1908     Fungus Stain Final report     KOH/Calcofluor preparation Comment     Comment: KOH/Calcofluor preparation:  no fungus observed.       Narrative:      Performed at:  [address] [place] [address]  [id number]  : Mahendra Finney PhD, Phone:  [phone]    Body Fluid Culture - Body Fluid, Peritoneum [[id number]] Collected: 08/13/24 1253    Lab Status: Preliminary result Specimen: Body Fluid from Peritoneum Updated: 08/14/24 1300     Body Fluid Culture No growth at 24 hours     Gram Stain WBCs seen      No organisms seen    Blood Culture - Blood, Arm, Left [[id number]]  (Normal) Collected: 08/12/24 1917    Lab Status: Preliminary result Specimen: Blood from Arm, Left Updated: 08/14/24 1931     Blood Culture No growth at 2 days    MRSA Screen, PCR (Inpatient) - Swab, Nares [[id number]]  (Normal) Collected: 08/11/24 1557    Lab Status: Final result Specimen: Swab from Nares Updated: 08/11/24 1716     MRSA PCR No MRSA Detected    Narrative:      The negative predictive value of this diagnostic test is high and should only be used to consider de-escalating anti-MRSA therapy. A positive result may indicate colonization with MRSA and must be correlated clinically.            Latest Reference Range & Units 08/11/24 12:14    pH, Arterial 7.350 - 7.450 pH units 7.423   pCO2, Arterial 35.0 - 45.0 mm Hg 28.7 (L)   pO2, Arterial 83.0 - 108.0 mm Hg 106.0   HCO3, Arterial 20.0 - 26.0 mmol/L 18.7 (L)   Base Excess 0.0 - 2.0 mmol/L -5.1 (L)   O2 Saturation, Arterial 94.0 - 99.0 % 99.0   CO2 Content 22 - 33 mmol/L 19.6 (L)   A-a DO2 0.0 - 300.0 mmHg 4.7   Carboxyhemoglobin 0 - 5 % 1.3   Methemoglobin 0.00 - 3.00 % 0.70   Oxyhemoglobin 94 - 99 % 97.0   Hematocrit, Blood Gas 38.0 - 51.0 % 20.8 (L)   Hemoglobin, Blood Gas 13.5 - 17.5 g/dL 6.8 (C)   Site  Right Radial   Óscar's Test  Positive   Modality  Room Air   FIO2 % 21   Ventilator Mode  NA   Barometric Pressure for Blood Gas mmHg 729   Notified By  [id number]   Notified Time  08/11/2024 12:18   Notified Who  GLORIA ESCALANTE   (C): Data is critically low  (L): Data is abnormally low   Latest Reference Range & Units 08/14/24 05:03   pH, Arterial 7.350 - 7.450 pH units 7.411   pCO2, Arterial 35.0 - 45.0 mm Hg 34.6 (L)   pO2, Arterial 83.0 - 108.0 mm Hg 72.0 (L)   HCO3, Arterial 20.0 - 26.0 mmol/L 21.9   Base Excess 0.0 - 2.0 mmol/L -2.3 (L)   O2 Saturation, Arterial 94.0 - 99.0 % 96.2   CO2 Content 22 - 33 mmol/L 23.0   A-a DO2 0.0 - 300.0 mmHg 32.2   Carboxyhemoglobin 0 - 5 % 2.2   Methemoglobin 0.00 - 3.00 % 0.60   Oxyhemoglobin 94 - 99 % 93.5 (L)   Hematocrit, Blood Gas 38.0 - 51.0 % 27.6 (L)   Hemoglobin, Blood Gas 13.5 - 17.5 g/dL 9.0 (L)   Site  Left Brachial   Óscar's Test  N/A   Modality  Room Air   FIO2 % 21   Ventilator Mode  NA   Barometric Pressure for Blood Gas mmHg 729   (L): Data is abnormally low  Assessment & Plan     Neurology-alert awake able to protect her airway.  Continue to monitor neurological status    Respiratory-latest chest x-ray reviewed.  FiO2 requirement reviewed and adjusted to maintain saturation 88 to 92%.    VBG from today morning reviewed.    Cardiology-   Septic shock-likely due to the foot ulcer and then amputation.  Was on Levophed.  Titrated off.  Continue  midodrine to maintain a MAP of 65 and above.    Hypertension-discontinued patient's midodrine.  Will start decreasing patient's steroids from tomorrow  For now continue stress dose steroids.  Continue to monitor HR- rate and rhythm, BP         Lab 08/13/24  2154 08/13/24  0616 08/13/24  0005 08/12/24  1812 08/12/24  1312   LACTATE 1.5 2.5* 3.5* 2.3* 2.3*      Nephrology- Cr and BUN stable  I/O-reviewed.  Creatinine elevated.  Nephrology on case.  Case was briefly discussed with them.  Continue management as per them.  Continue fluids as per nephrology.    Ionized calcium remains on the lower side.  Recommend replating.    Vitamin D levels on the lower side.  Will start replacement.      GI- PPI prophylaxis  Continue current diet.  .  Transaminitis-likely due to shock liver.    Levels better today.  Continue to monitor  Bleeding through central line-better after we gave FFP, transxemic acid and calcium chloride IV.    Elevated INR-vitamin K given.  2 units of FFP given.  Transfuse for hemoglobin less than 7.  INR better today  Thrombocytopenia-  Narrative & Impression   PROCEDURE: Abdominal ultrasound evaluation performed on August 11, 2024.  Color flow imaging performed.     HISTORY: Elevated liver function tests.     COMPARISON: None.     FINDINGS:     Echogenic liver suggestive of fatty infiltration.  Small volume of free fluid adjacent to the liver consistent with  ascites.  Patent hepatic veins with appropriate direction of flow.  Patent main portal vein with appropriate direction of flow.  No hepatic mass  No dilated intrahepatic bile ducts  Sludge noted in the gallbladder lumen.  No gallbladder wall thickening or para cholecystic fluid.  No right renal hydronephrosis  No features of cholecystitis.     IMPRESSION:     1.  Echogenic liver suggestive of fatty infiltration.  2.  Patent main portal vein with appropriate direction of flow.  3.  Patent hepatic veins with appropriate direction of flow.  4.  Sludge  noted in the gallbladder lumen.  5.  No features of cholecystitis.  6.  Small volume of free fluid in the right upper quadrant consistent  with ascites.  7.  No hepatic mass or cyst  8.  No dilated intrahepatic bile ducts.          Hematology- CBC  Hb-transfuse for hemoglobin less than 7  Thrombocytopenia-likely related to ongoing sepsis.  Continue to monitor.  Transfuse if drops below 20 and patient is still bleeding.  WBC    ID sepsis and septic shock-likely due to the ulcer.  Status post amputation.    Culture-reviewed  And Antibiotics-continue.  Cultures reviewed    Endrocrinology- Maintain Blood sugar 140 -180   Latest Reference Range & Units 08/13/24 00:04 08/13/24 23:32   TSH Baseline 0.270 - 4.200 uIU/mL  3.580   Free T4 0.92 - 1.68 ng/dL 1.88 (H)    (H): Data is abnormally high    Will repeat free T4 level tomorrow morning    Electrolytes-   Mag and phos       DVT prophylaxis-    Bedside rounds were done with RT and patient's nurse. All the lab and clinical findings were discussed with them and plan was also discussed in great detail.    Family member present-none    Overall prognosis poor.  Will get consult palliative care.    Echo-    Results for orders placed during the hospital encounter of 08/19/23    Adult Transthoracic Echo Complete W/ Cont if Necessary Per Protocol    Interpretation Summary    Left ventricular systolic function is normal. Left ventricular ejection fraction appears to be 56 - 60%.    Left ventricular diastolic function is consistent with (grade I) impaired relaxation.    Left atrial volume is mildly increased.    Moderate mitral valve stenosis is present.    Estimated right ventricular systolic pressure from tricuspid regurgitation is mildly elevated (35-45 mmHg).    Mild pulmonary hypertension is present.    There is no evidence of pericardial effusion.  Patient is currently critically ill due to hypertension, bleeding, shock liver and ongoing sepsis.  I adjusted patient's  medications.cc 31 mins  Case discussed with patient's nurse and primary team.          Diabetic ulcer of right heel          Johnson Rider MD  08/15/24  09:32 EDT

## 2024-08-15 NOTE — PROGRESS NOTES
Nurse Practitioner - Brief Progress Note  PERMANENT  08/14/2024 22:42    [facility] - [facility] - CCU - [address] - [place], [place] ([facility])    KELLIE MERINOTyson    Date of Service 08/14/2024 22:42    HPI/Events of Note [facility] Provider Intervention Note      RN reports pt is bleeding from a fingerstick. Last Hgb 9.2/Hct 26.8, INR 1.2, PT 15.3, PTT 96.4, platelets 104. Pt had received 1 unit PRBC, 2 units FFP, Tranexamic acid 1000 mg IV x1, Vitamin K 5 mg IV x1.  Heparin subq stopped yesterday.   Transaminitis–likely due to shock liver. Transfuse for hemoglobin less than 7.      Plan:  PT/INR, PTT, fibrinogen, CBC now  Hold Aspirin        __Y___   Video Assessment performed    D/W bedside RN. Contact [facility] for any needs if bedside physician is not present.      Interventions Intermediate-Communication with other healthcare providers and/or family        Electronically Signed by: Abraham Inman (ANP,CCRN) on 08/14/2024 22:57    Annotated By: Abraham Inman (ANP, CCRN)    Date: 08/15/24 00:07  Hgb 6.3/Hct 18.9, INR 1.13, plateletets 79. Pt has bleeding from a central line/finger.     Will transfuse 1 unit PRBC  Vitamin K 10 mg IV x1  CBC, CMP, Mg, Phos, PT/INR in the AM

## 2024-08-15 NOTE — PROGRESS NOTES
"Palliative Care Daily Progress Note     S: Medical record reviewed, followed up with Primary RN Roly and Dr Woodruff regarding patient's condition. When I saw Reny today she was more awake, alert and oriented and her mentation was much clearer and was processing questions and answering in a timely fashion, She denied pain, nausea, anxiety or shortness of breath. She was appropriate and in a calm pleasant mood.      O:   Palliative Performance Scale Score:     BP (!) 172/102   Pulse 74   Temp 97.7 °F (36.5 °C) (Oral)   Resp 10   Ht 162.6 cm (64.02\")   Wt 47.1 kg (103 lb 13.4 oz)   LMP 05/26/2005   SpO2 97%   BMI 17.81 kg/m²     Intake/Output Summary (Last 24 hours) at 8/15/2024 1650  Last data filed at 8/15/2024 1400  Gross per 24 hour   Intake 3243 ml   Output 994 ml   Net 2249 ml       PE:  General Appearance:    Chronically ill appearing, alert, .cooperative, NAD   HEENT:    NC/AT, without obvious abnormality, EOMI, anicteric    Neck:   supple, trachea midline, no JVD   Lungs:     Unlabored respirations, no wheezing rhonchi or rales noted.    Heart:    RRR, normal S1 and S2, no M/R/G   Abdomen:     Soft, NT, ND, NABS    Extremities:   RT Aka with kerlex and ace bandage intact, edema in left lower extremity   Pulses:   Pulses palpable and equal bilaterally   Skin:   Warm, dry   Neurologic:   Awake, alert and oriented to person place not time, she was faster to respond today and more clear with her words   Psych:   Calm, appropriate was smiling today         Meds: Reviewed and changes noted    Labs:   Results from last 7 days   Lab Units 08/15/24  1116   WBC 10*3/mm3 4.07   HEMOGLOBIN g/dL 11.2*   HEMATOCRIT % 33.7*   PLATELETS 10*3/mm3 64*     Results from last 7 days   Lab Units 08/15/24  0901   SODIUM mmol/L 137   POTASSIUM mmol/L 3.1*   CHLORIDE mmol/L 98   CO2 mmol/L 20.9*   BUN mg/dL 23   CREATININE mg/dL 2.54*   GLUCOSE mg/dL 163*   CALCIUM mg/dL 7.4*     Results from last 7 days   Lab Units " 08/15/24  0901   SODIUM mmol/L 137   POTASSIUM mmol/L 3.1*   CHLORIDE mmol/L 98   CO2 mmol/L 20.9*   BUN mg/dL 23   CREATININE mg/dL 2.54*   CALCIUM mg/dL 7.4*   BILIRUBIN mg/dL 1.2   ALK PHOS U/L 422*   ALT (SGPT) U/L 32   AST (SGOT) U/L 400*   GLUCOSE mg/dL 163*     Imaging Results (Last 72 Hours)       Procedure Component Value Units Date/Time    CT Head Without Contrast [[id number]] Collected: 08/14/24 0059     Updated: 08/14/24 0101    Narrative:      Noncontrast CT brain     Indications: Altered mental status     Technique: Noncontrast CT images of the brain were obtained.  Limited  exposure control, adjustment of the MA and/or KV according to patient  size or use of an iterative reconstruction technique was utilized.     Findings CT brain:     Comparison is made to the prior study dated 4/28/2024.     Examination is somewhat limited by patient positioning.  Axial images  are not true brain axial images.  Allowing for this, there is no  evidence of acute intercranial hemorrhage.  The ventricles are  prominent, stable from the prior study.  Patchy and confluent areas of  hypodensity involve the periventricular deep white matter compatible  sequelae of chronic small vessel ischemic changes.  There is no  mass-effect or midline shift.  No abnormal extra-axial fluid collections  are demonstrated.       Impression:      Impression:     No CT evidence of an acute intracranial process.     This report was finalized on 8/14/2024 12:59 AM by Chin Chowdhury MD.       CT Chest Without Contrast Diagnostic [[id number]] Collected: 08/13/24 1955     Updated: 08/13/24 2010    Narrative:      Procedure: Contiguous axial images were obtained through the chest  abdomen and pelvis without intravenous contrast.  The use of sagittal  coronal reformations are supplied.     Comparison: CT abdomen 8/22/2024, chest 4/28/2024     Findings     CHEST: Chest is well-expanded with diffuse groundglass attenuation  throughout all lobes.      A small left pleural effusion is present with airspace consolidation.     Trachea and mainstem bronchi are patent.     Central venous catheter present with distal tip at the atrial caval  junction.  Heart size within normal limits.     No adenopathy in the chest.     Cachexia and anasarca noted.     In bone windows, sternum, thoracic vertebral bodies and ribs are intact.   No pneumothorax.     ABDOMEN/PELVIS: Moderate ascites present.     Diffuse anasarca noted.  A catheter enters the mid abdomen, to the left  of midline with distal tip coiled in the left mid abdomen.     There may be fluid surrounding the gallbladder which is not  well-defined.     No extraluminal gas.  Urinary bladder distends normally.  A large amount  of free fluid in the pelvis.  Uterus is not identified.  A small amount  of formed stool present in the colon.  A discrete appendix is not  identified.     The noncontrast enhanced liver, spleen, stomach and adrenals are  morphologically unremarkable.  Kidneys are atrophic with calcified  vascular structures.     No dilated loops of bowel or bowel obstruction.     Above-the-knee amputation noted on the  view.     Moderate degenerative change at L5-S1.       Impression:         1.  Small to moderate left pleural effusion with adjacent airspace  consolidation suggesting atelectasis or pneumonia.     2.  Diffuse groundglass attenuation of the lungs compatible with  alveolitis.     3.   Mildly distended gallbladder with indistinct appearance.  If acute  cholecystitis is of clinical concern, right upper quadrant ultrasound  could be considered.     4.  Anasarca              This report was finalized on 8/13/2024 8:08 PM by Regina Magaña MD.       CT Abdomen Pelvis Without Contrast [[id number]] Collected: 08/13/24 1955     Updated: 08/13/24 2010    Narrative:      Procedure: Contiguous axial images were obtained through the chest  abdomen and pelvis without intravenous contrast.  The use of  sagittal  coronal reformations are supplied.     Comparison: CT abdomen 8/22/2024, chest 4/28/2024     Findings     CHEST: Chest is well-expanded with diffuse groundglass attenuation  throughout all lobes.     A small left pleural effusion is present with airspace consolidation.     Trachea and mainstem bronchi are patent.     Central venous catheter present with distal tip at the atrial caval  junction.  Heart size within normal limits.     No adenopathy in the chest.     Cachexia and anasarca noted.     In bone windows, sternum, thoracic vertebral bodies and ribs are intact.   No pneumothorax.     ABDOMEN/PELVIS: Moderate ascites present.     Diffuse anasarca noted.  A catheter enters the mid abdomen, to the left  of midline with distal tip coiled in the left mid abdomen.     There may be fluid surrounding the gallbladder which is not  well-defined.     No extraluminal gas.  Urinary bladder distends normally.  A large amount  of free fluid in the pelvis.  Uterus is not identified.  A small amount  of formed stool present in the colon.  A discrete appendix is not  identified.     The noncontrast enhanced liver, spleen, stomach and adrenals are  morphologically unremarkable.  Kidneys are atrophic with calcified  vascular structures.     No dilated loops of bowel or bowel obstruction.     Above-the-knee amputation noted on the  view.     Moderate degenerative change at L5-S1.       Impression:         1.  Small to moderate left pleural effusion with adjacent airspace  consolidation suggesting atelectasis or pneumonia.     2.  Diffuse groundglass attenuation of the lungs compatible with  alveolitis.     3.   Mildly distended gallbladder with indistinct appearance.  If acute  cholecystitis is of clinical concern, right upper quadrant ultrasound  could be considered.     4.  Anasarca              This report was finalized on 8/13/2024 8:08 PM by Regina Magaña MD.                 Diagnostics: Reviewed    A:  Reny Elena is a 64 y.o. female admitted on 8/2/2024 due to nausea, vomiting, with right foot pain and fever for a week before presenting to the ER. Reny has a medical history of arthritis, insulin dependent type II DM, chronic diastolic CHF, hypothyroidism, seizure disorder, HTN, HLD, GERD, iron deficiency anemia, PAD, and ESRD on PD,   Reny was given an antibiotic at prior ER visit on 7/21/24 due to a UTI and she stated after a couple of days of antibiotic she began to experience nausea and vomiting, apparently she stopped taking the antibiotic and symptoms continued to persist. This visit in the ED Reny did complain of increased rt foot pain. Reny was tachycardic. 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 Pulmonary, Infectious disease, and Nephrology are all consulted on Ms Elena.      P:  I was able to speak with patients significant other Sukh at bedside this morning to provide support. He was pleased that Reny was able to speak today where she was not able yesterday. We discussed that Per Roly CASTRO Reny had eaten a little better this morning. Discussed pt with Roly and Dr Woodruff    We will continue to follow along. Please do not hesitate to contact us regarding further sx mgmt or GOC needs, including after hours or on weekends via our on call provider at [phone].     Jennifer Sanders, APRN    8/15/2024

## 2024-08-15 NOTE — PROGRESS NOTES
Nurse Practitioner - Brief Progress Note  PERMANENT  08/15/2024 04:27    [facility] - [facility] - CCU - [address] - [place], [place] ([facility])    KELLIE MERINO    Date of Service 08/15/2024 04:27    HPI/Events of Note [facility] Provider Intervention Note    /102, HR 97. Given IV Hydralazine PRN. RN is requesting additional PRN med.       IV Labetalol PRN ordered.         __Y___   Video Assessment performed    [facility] for any needs if bedside physician is not present.      Interventions Intermediate-Communication with other healthcare providers and/or family, Hypertension - evaluation and management        Electronically Signed by: Abraham Inman (ANP,CCRN) on 08/15/2024 04:31

## 2024-08-15 NOTE — PROGRESS NOTES
Nephrology Progress Note      Subjective   Slightly better, blood pressure has been improving significantly  Objective       Vital signs :     Temp:  [96.8 °F (36 °C)-98.1 °F (36.7 °C)] 98.1 °F (36.7 °C)  Heart Rate:  [] 94  Resp:  [10-18] 10  BP: (145-188)/() 162/85    Intake/Output                               08/13/24 0701 - 08/14/24 0700 08/14/24 0701 - 08/15/24 0700 08/15/24 0701 - 08/16/24 0700     9613-5268 6937-2171 Total 6027-6955 8385-4309 Total 4702-2028 8259-2722 Total                    Intake    P.O.  50  -- 50  35  -- 35  40  -- 40    I.V.  1036.3  971.3 2007.5  798.8  926.3 1725  --  -- --    Blood  --  300 300  665  568 1233  300  -- 300    Volume (Transfuse RBC Infuse Each Unit Over: 3.5H) -- 300 300 -- -- -- -- -- --    Volume (Transfuse Fresh Frozen Plasma) -- -- -- 359 -- 359 -- -- --    Volume (Transfuse Fresh Frozen Plasma) -- -- -- 306 -- 306 -- -- --    Volume (Transfuse Pheresed Platelets) -- -- -- -- 268 268 -- -- --    Volume (Transfuse RBC Infuse Each Unit Over: 3.5H) -- -- -- -- 300 300 -- -- --    Volume (Transfuse RBC Infuse Each Unit Over: 3.5H) -- -- -- -- -- -- 300 -- 300    IV Piggyback  100  100 200  300  150 450  100  -- 100    Total Intake 1186.3 1371.3 2557.5 1798.8 1644.3 3443 440 -- 440       Output    Urine  --  400 400  200  200 400  --  -- --    Emesis/NG output  --  -- --  0  -- 0  --  -- --    Dialysis  --  -- --  --  -- --  594  -- 594    Total Output -- 400 400 200 200 400 594 -- 594             Physical Exam:    General Appearance : alert, pleasant, appears stated age, cooperative and oriented  Lungs : Bilateral decreased intensity of breath sounds, bibasilar crackles  Heart :  regular rhythm & normal rate, normal S1, S2 and no murmur, no rub  Abdomen : Soft, nondistended PD catheter site clear  Extremities : 1+ to trace edema, right AKA with dressing  Neurologic :   orientated to person, place, time and situation, Grossly no focal  "deficits        Laboratory Data :     Albumin Albumin   Date Value Ref Range Status   08/14/2024 2.2 (L) 3.5 - 5.2 g/dL Final   08/13/2024 2.0 (L) 3.5 - 5.2 g/dL Final   08/13/2024 3.5 3.5 - 5.2 g/dL Final        Magnesium No results found for: \"MG\"         PTH               No results found for: \"PTH\"    CBC and coagulation:  Results from last 7 days   Lab Units 08/15/24  0901 08/14/24  2314 08/14/24  1521 08/14/24  0702 08/13/24  2329 08/13/24  2154 08/13/24  0616 08/13/24  0005 08/13/24  0004 08/12/24  1918   PROCALCITONIN ng/mL  --   --   --   --   --   --   --   --   --  74.29*   LACTATE mmol/L  --   --   --   --   --  1.5 2.5* 3.5*  --   --    CRP mg/dL  --   --   --   --   --   --   --   --   --  1.65*   WBC 10*3/mm3 3.97 4.31  --  5.70   < >  --   --   --    < >  --    HEMOGLOBIN g/dL 11.4* 6.3*  --  9.2*   < >  --   --   --    < >  --    HEMATOCRIT % 33.6* 18.9*  --  26.8*   < >  --   --   --    < >  --    MCV fL 85.1 93.1  --  92.7   < >  --   --   --    < >  --    MCHC g/dL 33.9 33.3  --  34.3   < >  --   --   --    < >  --    PLATELETS 10*3/mm3 61* 79*  --  104*   < >  --   --   --    < >  --    INR   --  1.13* 1.20* 1.46*  --  2.13*  --   --   --   --     < > = values in this interval not displayed.     Acid/base balance:  Results from last 7 days   Lab Units 08/14/24  0503 08/13/24  2217 08/11/24  1214   PH, ARTERIAL pH units 7.411 7.399 7.423   PO2 ART mm Hg 72.0* 77.2* 106.0   PCO2, ARTERIAL mm Hg 34.6* 36.0 28.7*   HCO3 ART mmol/L 21.9 22.2 18.7*     Renal and electrolytes:    Results from last 7 days   Lab Units 08/14/24  2314 08/14/24  0702 08/13/24  2154 08/13/24  0745 08/13/24  0004 08/12/24  0312 08/11/24  1252 08/11/24  0744   SODIUM mmol/L  --  134* 134*  --  135* 136 132* 135*   POTASSIUM mmol/L  --  3.7 3.8  --  3.9 3.3* 3.7 3.5   MAGNESIUM mg/dL  --   --   --   --   --  1.3*  --  1.3*   CHLORIDE mmol/L  --  100 102  --  99 104 100 105   CO2 mmol/L  --  18.5* 17.9*  --  15.3* 15.9* 18.4* " 18.9*   BUN mg/dL  --  21 20  --  19 18 21 18   CREATININE mg/dL  --  3.03* 2.96*  --  2.88* 2.85* 3.22* 3.12*   CALCIUM mg/dL  --  6.8* 6.6*  --  6.5* 5.7* 6.0* 6.0*   IONIZED CALCIUM mmol/L 0.95*  --   --  0.74*  --   --   --   --      Estimated Creatinine Clearance: 13.6 mL/min (A) (by C-G formula based on SCr of 3.03 mg/dL (H)).  @GFRCG:3@   Liver and pancreatic function:  Results from last 7 days   Lab Units 08/14/24 0702 08/13/24 2154 08/13/24 0004 08/12/24 0312 08/11/24  1714   ALBUMIN g/dL 2.2* 2.0* 3.5   < >  --    BILIRUBIN mg/dL 0.9 0.5 0.7   < >  --    ALK PHOS U/L 433* 451* 323*   < >  --    AST (SGOT) U/L 1,095* 1,449* 3,281*   < >  --    ALT (SGPT) U/L 73* 98* 296*   < >  --    AMMONIA umol/L  --   --   --   --  22    < > = values in this interval not displayed.         Cardiac:      Liver and pancreatic function:  Results from last 7 days   Lab Units 08/14/24 0702 08/13/24 2154 08/13/24 0004 08/12/24 0312 08/11/24  1714   ALBUMIN g/dL 2.2* 2.0* 3.5   < >  --    BILIRUBIN mg/dL 0.9 0.5 0.7   < >  --    ALK PHOS U/L 433* 451* 323*   < >  --    AST (SGOT) U/L 1,095* 1,449* 3,281*   < >  --    ALT (SGPT) U/L 73* 98* 296*   < >  --    AMMONIA umol/L  --   --   --   --  22    < > = values in this interval not displayed.       Medications :     [Held by provider] atorvastatin, 80 mg, Oral, Nightly  ferrous sulfate, 325 mg, Oral, Daily With Breakfast  FLUoxetine, 10 mg, Oral, Daily  folic acid, 1 mg, Oral, Daily  hydrocortisone sodium succinate, 50 mg, Intravenous, Q6H  insulin lispro, 2-7 Units, Subcutaneous, 4x Daily AC & at Bedtime  lacosamide, 50 mg, Oral, Q12H  levothyroxine, 100 mcg, Oral, QAM AC  midodrine, 7.5 mg, Oral, TID AC  mupirocin, 1 Application, Topical, Q12H  pantoprazole, 40 mg, Oral, QAM AC  piperacillin-tazobactam, 3.375 g, Intravenous, Q12H  sodium chloride, 1,000 mL, Intravenous, Once  sodium chloride, 10 mL, Intravenous, Q12H  sodium chloride, 10 mL, Intravenous, Q12H  sodium  chloride, 10 mL, Intravenous, Q12H  sodium chloride, 10 mL, Intravenous, Q12H  sodium chloride, 10 mL, Intravenous, Q12H  traZODone, 25 mg, Oral, Nightly  Vancomycin Pharmacy Intermittent/Pulse Dosing, , Does not apply, Daily      Pharmacy Consult - Pharmacy to dose,   Pharmacy Consult - Pharmacy to dose,             Assessment & Plan       -End-stage renal disease on peritoneal dialysis  -Hypotension  -Anemia  -Hypokalemia  -Type 2 diabetes mellitus with renal involvement  - Persistent nausea and vomiting  - Cachexia and malnutrition      Continue on peritoneal dialysis, will use 1.5% dialysate today as well    Hypokalemia, better continue on oral potassium replacement    Metabolic acidosis, persistent continue on bicarb fluid    Discussed with Dr. Javier    Discussed with RN    Please avoid nephrotoxic medication and pharmacy to adjust the medication according to the GFR.      Plan of care was discussed with the patient. Patient understood, verbalized, agreed.      Nicholas Arroyo MD  08/15/24  09:58 EDT

## 2024-08-15 NOTE — PLAN OF CARE
Problem: Pain Acute  Goal: Acceptable Pain Control and Functional Ability  Outcome: Ongoing, Not Progressing  Intervention: Prevent or Manage Pain  Recent Flowsheet Documentation  Taken 8/15/2024 0200 by Rachel Haddad RN  Medication Review/Management: medications reviewed  Taken 8/15/2024 0000 by Rachel Haddad RN  Medication Review/Management: medications reviewed  Taken 8/14/2024 2200 by Rachel Haddad RN  Medication Review/Management: medications reviewed  Taken 8/14/2024 2000 by Rachel Haddad RN  Medication Review/Management: medications reviewed  Intervention: Optimize Psychosocial Wellbeing  Recent Flowsheet Documentation  Taken 8/14/2024 2000 by Rachel Haddad RN  Diversional Activities: television     Problem: Impaired Wound Healing  Goal: Optimal Wound Healing  Outcome: Ongoing, Not Progressing  Intervention: Promote Wound Healing  Recent Flowsheet Documentation  Taken 8/15/2024 0200 by Rachel Haddad RN  Activity Management: bedrest  Taken 8/14/2024 2000 by Rachel Haddad RN  Activity Management: bedrest     Problem: Adult Inpatient Plan of Care  Goal: Plan of Care Review  Outcome: Ongoing, Not Progressing  Goal: Patient-Specific Goal (Individualized)  Outcome: Ongoing, Not Progressing  Goal: Absence of Hospital-Acquired Illness or Injury  Outcome: Ongoing, Not Progressing  Intervention: Identify and Manage Fall Risk  Recent Flowsheet Documentation  Taken 8/15/2024 0300 by Rachel Haddad RN  Safety Promotion/Fall Prevention: safety round/check completed  Taken 8/15/2024 0200 by Rachel Haddad RN  Safety Promotion/Fall Prevention: safety round/check completed  Taken 8/15/2024 0100 by Rachel Haddad RN  Safety Promotion/Fall Prevention: safety round/check completed  Taken 8/15/2024 0000 by Rosenbalm, Rachel, RN  Safety Promotion/Fall Prevention: safety round/check completed  Taken 8/14/2024 2200 by Rachel Haddad RN  Safety  Promotion/Fall Prevention: safety round/check completed  Taken 8/14/2024 2100 by Rachel Haddad RN  Safety Promotion/Fall Prevention: safety round/check completed  Taken 8/14/2024 2000 by Rosenbalm, Rachel, RN  Safety Promotion/Fall Prevention: safety round/check completed  Taken 8/14/2024 1900 by Rosenbalm, Rachel, RN  Safety Promotion/Fall Prevention: safety round/check completed  Intervention: Prevent Skin Injury  Recent Flowsheet Documentation  Taken 8/15/2024 0200 by Rachel Haddad RN  Body Position:   right   turned  Taken 8/15/2024 0000 by Rachel Haddad RN  Body Position:   left   turned  Taken 8/14/2024 2200 by Rachel Haddad RN  Body Position:   right   turned  Taken 8/14/2024 2000 by Rachel Haddad RN  Body Position:   left   turned  Intervention: Prevent and Manage VTE (Venous Thromboembolism) Risk  Recent Flowsheet Documentation  Taken 8/15/2024 0200 by Rachel Haddad RN  Activity Management: bedrest  VTE Prevention/Management:   sequential compression devices on   left  Taken 8/14/2024 2000 by Rachel Haddad RN  Activity Management: bedrest  VTE Prevention/Management:   sequential compression devices on   left  Goal: Optimal Comfort and Wellbeing  Outcome: Ongoing, Not Progressing  Intervention: Provide Person-Centered Care  Recent Flowsheet Documentation  Taken 8/15/2024 0200 by Rachel Haddad RN  Trust Relationship/Rapport:   choices provided   care explained  Taken 8/14/2024 2000 by Rachel Haddad RN  Trust Relationship/Rapport:   care explained   choices provided  Goal: Readiness for Transition of Care  Outcome: Ongoing, Not Progressing     Problem: Fall Injury Risk  Goal: Absence of Fall and Fall-Related Injury  Outcome: Ongoing, Not Progressing  Intervention: Identify and Manage Contributors  Recent Flowsheet Documentation  Taken 8/15/2024 0200 by Rachel Haddad RN  Medication Review/Management: medications reviewed  Taken 8/15/2024  0000 by Rachel Haddad RN  Medication Review/Management: medications reviewed  Taken 8/14/2024 2200 by Rachel Haddad RN  Medication Review/Management: medications reviewed  Taken 8/14/2024 2000 by Rachel Haddad RN  Medication Review/Management: medications reviewed  Intervention: Promote Injury-Free Environment  Recent Flowsheet Documentation  Taken 8/15/2024 0300 by Rachel Haddad RN  Safety Promotion/Fall Prevention: safety round/check completed  Taken 8/15/2024 0200 by Rachel Haddad RN  Safety Promotion/Fall Prevention: safety round/check completed  Taken 8/15/2024 0100 by Rachel Haddad RN  Safety Promotion/Fall Prevention: safety round/check completed  Taken 8/15/2024 0000 by Rosenbalm, Rachel, RN  Safety Promotion/Fall Prevention: safety round/check completed  Taken 8/14/2024 2200 by Rachel Haddad RN  Safety Promotion/Fall Prevention: safety round/check completed  Taken 8/14/2024 2100 by Rachel Haddad RN  Safety Promotion/Fall Prevention: safety round/check completed  Taken 8/14/2024 2000 by Rosenbalm, Rachel, RN  Safety Promotion/Fall Prevention: safety round/check completed  Taken 8/14/2024 1900 by Rosenbalm, Rachel, RN  Safety Promotion/Fall Prevention: safety round/check completed     Problem: Skin Injury Risk Increased  Goal: Skin Health and Integrity  Outcome: Ongoing, Not Progressing  Intervention: Optimize Skin Protection  Recent Flowsheet Documentation  Taken 8/15/2024 0200 by Rachel Haddad RN  Head of Bed (HOB) Positioning: HOB at 30-45 degrees  Taken 8/15/2024 0000 by Rachel Haddad RN  Head of Bed (HOB) Positioning: HOB at 30-45 degrees  Taken 8/14/2024 2200 by Rachel Haddad RN  Head of Bed (HOB) Positioning: HOB at 30-45 degrees  Taken 8/14/2024 2000 by Rachel Haddad RN  Head of Bed (HOB) Positioning: HOB at 30-45 degrees     Problem: Behavioral Health Comorbidity  Goal: Maintenance of Behavioral Health Symptom  Control  Outcome: Ongoing, Not Progressing  Intervention: Maintain Behavioral Health Symptom Control  Recent Flowsheet Documentation  Taken 8/15/2024 0200 by Rachel Haddad RN  Medication Review/Management: medications reviewed  Taken 8/15/2024 0000 by Rachel Haddad RN  Medication Review/Management: medications reviewed  Taken 8/14/2024 2200 by Rachel Haddad RN  Medication Review/Management: medications reviewed  Taken 8/14/2024 2000 by Rachel Haddad RN  Medication Review/Management: medications reviewed     Problem: Diabetes Comorbidity  Goal: Blood Glucose Level Within Targeted Range  Outcome: Ongoing, Not Progressing     Problem: Hypertension Comorbidity  Goal: Blood Pressure in Desired Range  Outcome: Ongoing, Not Progressing  Intervention: Maintain Blood Pressure Management  Recent Flowsheet Documentation  Taken 8/15/2024 0200 by Rachel Haddad RN  Medication Review/Management: medications reviewed  Taken 8/15/2024 0000 by Rachel Haddad RN  Medication Review/Management: medications reviewed  Taken 8/14/2024 2200 by Rachel Haddad RN  Medication Review/Management: medications reviewed  Taken 8/14/2024 2000 by Rachel Haddad RN  Medication Review/Management: medications reviewed     Problem: Adjustment to Illness (Sepsis/Septic Shock)  Goal: Optimal Coping  Outcome: Ongoing, Not Progressing  Intervention: Optimize Psychosocial Adjustment to Illness  Recent Flowsheet Documentation  Taken 8/14/2024 2000 by Rachel Haddad RN  Family/Support System Care: support provided     Problem: Bleeding (Sepsis/Septic Shock)  Goal: Absence of Bleeding  Outcome: Ongoing, Not Progressing     Problem: Glycemic Control Impaired (Sepsis/Septic Shock)  Goal: Blood Glucose Level Within Desired Range  Outcome: Ongoing, Not Progressing     Problem: Infection Progression (Sepsis/Septic Shock)  Goal: Absence of Infection Signs and Symptoms  Outcome: Ongoing, Not  Progressing  Intervention: Promote Recovery  Recent Flowsheet Documentation  Taken 8/15/2024 0200 by Rachel Haddad, RN  Activity Management: bedrest  Taken 8/14/2024 2000 by Rachel Haddad, RN  Activity Management: bedrest     Problem: Nutrition Impaired (Sepsis/Septic Shock)  Goal: Optimal Nutrition Intake  Outcome: Ongoing, Not Progressing     Problem: Adjustment to Amputation (Lower Extremity Amputation)  Goal: Optimal Adjustment to Amputation  Outcome: Ongoing, Not Progressing  Intervention: Promote Patient and Family Adjustment to Amputation  Recent Flowsheet Documentation  Taken 8/14/2024 2000 by Rachel Haddad, RN  Family/Support System Care: support provided     Problem: BADL (Basic Activities of Daily Living) Impairment (Lower Extremity Amputation)  Goal: Optimal Safe BADL Performance  Outcome: Ongoing, Not Progressing     Problem: IADL (Instrumental Activities of Daily Living) Impairment (Lower Extremity Amputation)  Goal: Optimal Safe IADL Performance  Outcome: Ongoing, Not Progressing     Problem: Lower Limb Care (Lower Extremity Amputation)  Goal: Effective Lower Limb Care  Outcome: Ongoing, Not Progressing     Problem: Mobility Impairment (Lower Extremity Amputation)  Goal: Optimal Mobility Phelps and Safety  Outcome: Ongoing, Not Progressing     Problem: Pain and Hypersensitivity (Lower Extremity Amputation)  Goal: Acceptable Pain/Hypersensitivity Control  Outcome: Ongoing, Not Progressing   Goal Outcome Evaluation:

## 2024-08-15 NOTE — PLAN OF CARE
Problem: Adult Inpatient Plan of Care  Goal: Plan of Care Review  Recent Flowsheet Documentation  Taken 8/15/2024 1547 by Roly Chinchilla RN  Progress: improving  Outcome Evaluation: Pt. alert throughout shift, will answer questions appropriately but hesitant to talk or answer questions, VSS, no acute events noted, received 1 unit PRBC throughout shift, bleeding noted to be improved, bed in lowest position, alarms active and audible, bed in lowest position.     Problem: Adult Inpatient Plan of Care  Goal: Absence of Hospital-Acquired Illness or Injury  Intervention: Prevent Skin Injury  Recent Flowsheet Documentation  Taken 8/15/2024 1400 by Roly Chinchilla RN  Body Position:   turned   right  Taken 8/15/2024 1200 by Roly Chinchilla RN  Body Position:   turned   left  Taken 8/15/2024 1000 by Roly Chinchilla RN  Body Position:   turned   right  Taken 8/15/2024 0800 by Roly Chinchilla RN  Body Position:   turned   left  Skin Protection:   adhesive use limited   incontinence pads utilized   skin-to-device areas padded   transparent dressing maintained   tubing/devices free from skin contact     Problem: Adult Inpatient Plan of Care  Goal: Absence of Hospital-Acquired Illness or Injury  Intervention: Prevent and Manage VTE (Venous Thromboembolism) Risk  Recent Flowsheet Documentation  Taken 8/15/2024 1400 by Roly Chinchilla RN  Activity Management: bedrest  VTE Prevention/Management:   left   sequential compression devices on  Range of Motion: active ROM (range of motion) encouraged  Taken 8/15/2024 0800 by Roly Chinchilla RN  Activity Management: bedrest  VTE Prevention/Management:   left   sequential compression devices on  Range of Motion:   active ROM (range of motion) encouraged   ROM (range of motion) performed     Problem: Hypertension Comorbidity  Goal: Blood Pressure in Desired Range  Intervention: Maintain Blood Pressure Management  Recent Flowsheet Documentation  Taken 8/15/2024 1500 by Roly Chinchilla  RN  Medication Review/Management: medications reviewed  Taken 8/15/2024 1400 by Roly Chinchilla RN  Medication Review/Management: medications reviewed  Taken 8/15/2024 1300 by Roly Chinchilla RN  Medication Review/Management: medications reviewed  Taken 8/15/2024 1200 by Roly Chinchilla RN  Medication Review/Management: medications reviewed  Taken 8/15/2024 1100 by Roly Chinchilla RN  Medication Review/Management: medications reviewed  Taken 8/15/2024 1000 by Roly Chinchilla RN  Medication Review/Management: medications reviewed  Taken 8/15/2024 0900 by Roly Chinchilla RN  Medication Review/Management: medications reviewed  Taken 8/15/2024 0800 by Roly Chinchilla RN  Medication Review/Management: medications reviewed  Taken 8/15/2024 0700 by Roly Chinchilla RN  Medication Review/Management: medications reviewed     Problem: Diabetes Comorbidity  Goal: Blood Glucose Level Within Targeted Range  Outcome: Ongoing, Progressing  Intervention: Monitor and Manage Glycemia  Recent Flowsheet Documentation  Taken 8/15/2024 1400 by Roly Chinchilla RN  Glycemic Management: blood glucose monitored  Taken 8/15/2024 0800 by Roly Chinchilla RN  Glycemic Management: blood glucose monitored     Problem: Heart Failure Comorbidity  Goal: Maintenance of Heart Failure Symptom Control  Intervention: Maintain Heart Failure-Management  Recent Flowsheet Documentation  Taken 8/15/2024 1500 by Roly Chinchilla RN  Medication Review/Management: medications reviewed  Taken 8/15/2024 1400 by Roly Chinchilla RN  Medication Review/Management: medications reviewed  Taken 8/15/2024 1300 by Roly Chinchilla RN  Medication Review/Management: medications reviewed  Taken 8/15/2024 1200 by Roly Chinchilla RN  Medication Review/Management: medications reviewed  Taken 8/15/2024 1100 by Roly Chinchilla RN  Medication Review/Management: medications reviewed  Taken 8/15/2024 1000 by Roly Chinchilla RN  Medication Review/Management: medications reviewed  Taken  8/15/2024 0900 by Roly Chinchilla RN  Medication Review/Management: medications reviewed  Taken 8/15/2024 0800 by Roly Chinchilla RN  Medication Review/Management: medications reviewed  Taken 8/15/2024 0700 by Roly Chinchilla RN  Medication Review/Management: medications reviewed     Problem: Fall Injury Risk  Goal: Absence of Fall and Fall-Related Injury  Intervention: Identify and Manage Contributors  Recent Flowsheet Documentation  Taken 8/15/2024 1500 by Roly Chinchilla RN  Medication Review/Management: medications reviewed  Taken 8/15/2024 1400 by Roly Chinchilla RN  Medication Review/Management: medications reviewed  Taken 8/15/2024 1300 by Roly Chinchilla RN  Medication Review/Management: medications reviewed  Taken 8/15/2024 1200 by Roly Chinchilla RN  Medication Review/Management: medications reviewed  Taken 8/15/2024 1100 by Roly Chinchilla RN  Medication Review/Management: medications reviewed  Taken 8/15/2024 1000 by Roly Chinchilla RN  Medication Review/Management: medications reviewed  Taken 8/15/2024 0900 by Roly Chinchilla RN  Medication Review/Management: medications reviewed  Taken 8/15/2024 0800 by Roly Chinchilla RN  Medication Review/Management: medications reviewed  Taken 8/15/2024 0700 by Roly Chinchilla RN  Medication Review/Management: medications reviewed  Intervention: Promote Injury-Free Environment  Recent Flowsheet Documentation  Taken 8/15/2024 1500 by Roly Chinchilla RN  Safety Promotion/Fall Prevention:   activity supervised   fall prevention program maintained   safety round/check completed  Taken 8/15/2024 1400 by Roly Chinchilla RN  Safety Promotion/Fall Prevention:   activity supervised   fall prevention program maintained   safety round/check completed  Taken 8/15/2024 1300 by Roly Chinchilla RN  Safety Promotion/Fall Prevention:   activity supervised   fall prevention program maintained   safety round/check completed  Taken 8/15/2024 1200 by Roly Chinchilla RN  Safety  Promotion/Fall Prevention:   activity supervised   fall prevention program maintained   safety round/check completed  Taken 8/15/2024 1100 by Roly Chinchilla RN  Safety Promotion/Fall Prevention:   activity supervised   fall prevention program maintained   safety round/check completed  Taken 8/15/2024 1000 by Roly Chinchilla RN  Safety Promotion/Fall Prevention:   activity supervised   fall prevention program maintained   safety round/check completed  Taken 8/15/2024 0900 by Roly Chinchilla RN  Safety Promotion/Fall Prevention:   activity supervised   fall prevention program maintained   safety round/check completed  Taken 8/15/2024 0800 by Roly Chinchilla RN  Safety Promotion/Fall Prevention:   activity supervised   fall prevention program maintained   safety round/check completed  Taken 8/15/2024 0700 by Roly Chinchilla RN  Safety Promotion/Fall Prevention:   activity supervised   fall prevention program maintained   safety round/check completed     Problem: Impaired Wound Healing  Goal: Optimal Wound Healing  Outcome: Ongoing, Progressing  Intervention: Promote Wound Healing  Recent Flowsheet Documentation  Taken 8/15/2024 1400 by Roly Chinchilla RN  Activity Management: bedrest  Taken 8/15/2024 0800 by Roly Chinchilla RN  Activity Management: bedrest  Pain Management Interventions: see MAR     Problem: Adjustment to Amputation (Lower Extremity Amputation)  Goal: Optimal Adjustment to Amputation  Outcome: Ongoing, Progressing  Intervention: Promote Patient and Family Adjustment to Amputation  Recent Flowsheet Documentation  Taken 8/15/2024 1400 by Roly Chinchilla RN  Family/Support System Care:   support provided   self-care encouraged  Taken 8/15/2024 0800 by Roly Chinchilla RN  Family/Support System Care:   support provided   self-care encouraged   Goal Outcome Evaluation:           Progress: improving  Outcome Evaluation: Pt. alert throughout shift, will answer questions appropriately but hesitant to talk or  answer questions, VSS, no acute events noted, received 1 unit PRBC throughout shift, bleeding noted to be improved, bed in lowest position, alarms active and audible, bed in lowest position.

## 2024-08-16 LAB
ALBUMIN SERPL-MCNC: 2.5 G/DL (ref 3.5–5.2)
ALBUMIN/GLOB SERPL: 1.3 G/DL
ALP SERPL-CCNC: 496 U/L (ref 39–117)
ALT SERPL W P-5'-P-CCNC: 30 U/L (ref 1–33)
ANION GAP SERPL CALCULATED.3IONS-SCNC: 16 MMOL/L (ref 5–15)
ANION GAP SERPL CALCULATED.3IONS-SCNC: 17.9 MMOL/L (ref 5–15)
AST SERPL-CCNC: 258 U/L (ref 1–32)
BASOPHILS # BLD AUTO: 0 10*3/MM3 (ref 0–0.2)
BASOPHILS NFR BLD AUTO: 0 % (ref 0–1.5)
BH BB BLOOD EXPIRATION DATE: NORMAL
BH BB BLOOD TYPE BARCODE: 5100
BH BB DISPENSE STATUS: NORMAL
BH BB PRODUCT CODE: NORMAL
BH BB UNIT NUMBER: NORMAL
BILIRUB SERPL-MCNC: 0.6 MG/DL (ref 0–1.2)
BUN SERPL-MCNC: 24 MG/DL (ref 8–23)
BUN SERPL-MCNC: 25 MG/DL (ref 8–23)
BUN/CREAT SERPL: 9.2 (ref 7–25)
BUN/CREAT SERPL: 9.6 (ref 7–25)
CALCIUM SPEC-SCNC: 7.2 MG/DL (ref 8.6–10.5)
CALCIUM SPEC-SCNC: 7.2 MG/DL (ref 8.6–10.5)
CHLORIDE SERPL-SCNC: 98 MMOL/L (ref 98–107)
CHLORIDE SERPL-SCNC: 99 MMOL/L (ref 98–107)
CO2 SERPL-SCNC: 20.1 MMOL/L (ref 22–29)
CO2 SERPL-SCNC: 22 MMOL/L (ref 22–29)
CREAT SERPL-MCNC: 2.6 MG/DL (ref 0.57–1)
CREAT SERPL-MCNC: 2.61 MG/DL (ref 0.57–1)
CROSSMATCH INTERPRETATION: NORMAL
DEPRECATED RDW RBC AUTO: 51.9 FL (ref 37–54)
EGFRCR SERPLBLD CKD-EPI 2021: 19.7 ML/MIN/1.73
EGFRCR SERPLBLD CKD-EPI 2021: 19.8 ML/MIN/1.73
EOSINOPHIL # BLD AUTO: 0 10*3/MM3 (ref 0–0.4)
EOSINOPHIL NFR BLD AUTO: 0 % (ref 0.3–6.2)
ERYTHROCYTE [DISTWIDTH] IN BLOOD BY AUTOMATED COUNT: 17.1 % (ref 12.3–15.4)
GLOBULIN UR ELPH-MCNC: 1.9 GM/DL
GLUCOSE BLDC GLUCOMTR-MCNC: 156 MG/DL (ref 70–130)
GLUCOSE BLDC GLUCOMTR-MCNC: 217 MG/DL (ref 70–130)
GLUCOSE BLDC GLUCOMTR-MCNC: 249 MG/DL (ref 70–130)
GLUCOSE BLDC GLUCOMTR-MCNC: 253 MG/DL (ref 70–130)
GLUCOSE SERPL-MCNC: 216 MG/DL (ref 65–99)
GLUCOSE SERPL-MCNC: 233 MG/DL (ref 65–99)
HCT VFR BLD AUTO: 31.5 % (ref 34–46.6)
HGB BLD-MCNC: 11 G/DL (ref 12–15.9)
IMM GRANULOCYTES # BLD AUTO: 0.02 10*3/MM3 (ref 0–0.05)
IMM GRANULOCYTES NFR BLD AUTO: 0.6 % (ref 0–0.5)
LYMPHOCYTES # BLD AUTO: 0.29 10*3/MM3 (ref 0.7–3.1)
LYMPHOCYTES NFR BLD AUTO: 8.1 % (ref 19.6–45.3)
MAGNESIUM SERPL-MCNC: 2.2 MG/DL (ref 1.6–2.4)
MCH RBC QN AUTO: 29.5 PG (ref 26.6–33)
MCHC RBC AUTO-ENTMCNC: 34.9 G/DL (ref 31.5–35.7)
MCV RBC AUTO: 84.5 FL (ref 79–97)
MONOCYTES # BLD AUTO: 0.25 10*3/MM3 (ref 0.1–0.9)
MONOCYTES NFR BLD AUTO: 7 % (ref 5–12)
NEUTROPHILS NFR BLD AUTO: 3 10*3/MM3 (ref 1.7–7)
NEUTROPHILS NFR BLD AUTO: 84.3 % (ref 42.7–76)
NRBC BLD AUTO-RTO: 0 /100 WBC (ref 0–0.2)
PF4 HEPARIN CMPLX IGG SERPL IA: 0.09 OD (ref 0–0.4)
PHOSPHATE SERPL-MCNC: 2.6 MG/DL (ref 2.5–4.5)
PLATELET # BLD AUTO: 74 10*3/MM3 (ref 140–450)
PMV BLD AUTO: 10.3 FL (ref 6–12)
POTASSIUM SERPL-SCNC: 3 MMOL/L (ref 3.5–5.2)
POTASSIUM SERPL-SCNC: 3.3 MMOL/L (ref 3.5–5.2)
PROT SERPL-MCNC: 4.4 G/DL (ref 6–8.5)
RBC # BLD AUTO: 3.73 10*6/MM3 (ref 3.77–5.28)
SODIUM SERPL-SCNC: 136 MMOL/L (ref 136–145)
SODIUM SERPL-SCNC: 137 MMOL/L (ref 136–145)
UNIT  ABO: NORMAL
UNIT  RH: NORMAL
VANCOMYCIN SERPL-MCNC: 25.5 MCG/ML (ref 5–40)
WBC NRBC COR # BLD AUTO: 3.56 10*3/MM3 (ref 3.4–10.8)

## 2024-08-16 PROCEDURE — 84100 ASSAY OF PHOSPHORUS: CPT | Performed by: HOSPITALIST

## 2024-08-16 PROCEDURE — 80202 ASSAY OF VANCOMYCIN: CPT | Performed by: HOSPITALIST

## 2024-08-16 PROCEDURE — 83735 ASSAY OF MAGNESIUM: CPT | Performed by: HOSPITALIST

## 2024-08-16 PROCEDURE — 63710000001 INSULIN LISPRO (HUMAN) PER 5 UNITS: Performed by: NURSE PRACTITIONER

## 2024-08-16 PROCEDURE — 25010000002 HYDROCORTISONE SOD SUC (PF) 100 MG RECONSTITUTED SOLUTION: Performed by: INTERNAL MEDICINE

## 2024-08-16 PROCEDURE — 82948 REAGENT STRIP/BLOOD GLUCOSE: CPT

## 2024-08-16 PROCEDURE — 25010000002 PIPERACILLIN SOD-TAZOBACTAM PER 1 G: Performed by: HOSPITALIST

## 2024-08-16 PROCEDURE — 25010000002 HYDROMORPHONE PER 4 MG: Performed by: HOSPITALIST

## 2024-08-16 PROCEDURE — 94799 UNLISTED PULMONARY SVC/PX: CPT

## 2024-08-16 PROCEDURE — 80053 COMPREHEN METABOLIC PANEL: CPT | Performed by: HOSPITALIST

## 2024-08-16 PROCEDURE — 85025 COMPLETE CBC W/AUTO DIFF WBC: CPT | Performed by: HOSPITALIST

## 2024-08-16 PROCEDURE — 99233 SBSQ HOSP IP/OBS HIGH 50: CPT | Performed by: HOSPITALIST

## 2024-08-16 PROCEDURE — 99233 SBSQ HOSP IP/OBS HIGH 50: CPT | Performed by: INTERNAL MEDICINE

## 2024-08-16 PROCEDURE — 99233 SBSQ HOSP IP/OBS HIGH 50: CPT | Performed by: NURSE PRACTITIONER

## 2024-08-16 PROCEDURE — 94761 N-INVAS EAR/PLS OXIMETRY MLT: CPT

## 2024-08-16 PROCEDURE — 63710000001 INSULIN LISPRO (HUMAN) PER 5 UNITS: Performed by: HOSPITALIST

## 2024-08-16 PROCEDURE — 25010000002 HYDRALAZINE PER 20 MG: Performed by: INTERNAL MEDICINE

## 2024-08-16 PROCEDURE — 25010000002 LABETALOL 5 MG/ML SOLUTION: Performed by: NURSE PRACTITIONER

## 2024-08-16 PROCEDURE — 63710000001 INSULIN GLARGINE PER 5 UNITS: Performed by: HOSPITALIST

## 2024-08-16 RX ORDER — POTASSIUM CHLORIDE 1.5 G/1.58G
40 POWDER, FOR SOLUTION ORAL ONCE
Status: COMPLETED | OUTPATIENT
Start: 2024-08-16 | End: 2024-08-16

## 2024-08-16 RX ORDER — HYDROMORPHONE HYDROCHLORIDE 1 MG/ML
0.5 INJECTION, SOLUTION INTRAMUSCULAR; INTRAVENOUS; SUBCUTANEOUS ONCE
Status: COMPLETED | OUTPATIENT
Start: 2024-08-16 | End: 2024-08-16

## 2024-08-16 RX ORDER — INSULIN LISPRO 100 [IU]/ML
2-9 INJECTION, SOLUTION INTRAVENOUS; SUBCUTANEOUS
Status: DISCONTINUED | OUTPATIENT
Start: 2024-08-16 | End: 2024-08-21 | Stop reason: HOSPADM

## 2024-08-16 RX ADMIN — MUPIROCIN 1 APPLICATION: 20 OINTMENT TOPICAL at 08:33

## 2024-08-16 RX ADMIN — OXYCODONE HYDROCHLORIDE 5 MG: 5 TABLET ORAL at 17:59

## 2024-08-16 RX ADMIN — Medication 10 ML: at 09:35

## 2024-08-16 RX ADMIN — INSULIN LISPRO 3 UNITS: 100 INJECTION, SOLUTION INTRAVENOUS; SUBCUTANEOUS at 06:33

## 2024-08-16 RX ADMIN — PIPERACILLIN SODIUM AND TAZOBACTAM SODIUM 3.38 G: 3; .375 INJECTION, POWDER, LYOPHILIZED, FOR SOLUTION INTRAVENOUS at 20:21

## 2024-08-16 RX ADMIN — LABETALOL HYDROCHLORIDE 10 MG: 5 INJECTION, SOLUTION INTRAVENOUS at 02:57

## 2024-08-16 RX ADMIN — LEVOTHYROXINE SODIUM 100 MCG: 0.1 TABLET ORAL at 06:34

## 2024-08-16 RX ADMIN — INSULIN LISPRO 4 UNITS: 100 INJECTION, SOLUTION INTRAVENOUS; SUBCUTANEOUS at 11:16

## 2024-08-16 RX ADMIN — PANTOPRAZOLE SODIUM 40 MG: 40 TABLET, DELAYED RELEASE ORAL at 06:34

## 2024-08-16 RX ADMIN — HYDRALAZINE HYDROCHLORIDE 10 MG: 20 INJECTION INTRAMUSCULAR; INTRAVENOUS at 00:01

## 2024-08-16 RX ADMIN — INSULIN LISPRO 4 UNITS: 100 INJECTION, SOLUTION INTRAVENOUS; SUBCUTANEOUS at 21:54

## 2024-08-16 RX ADMIN — INSULIN GLARGINE 6 UNITS: 100 INJECTION, SOLUTION SUBCUTANEOUS at 11:16

## 2024-08-16 RX ADMIN — OFLOXACIN 50000 UNITS: 300 TABLET, COATED ORAL at 08:32

## 2024-08-16 RX ADMIN — HYDROCORTISONE SODIUM SUCCINATE 50 MG: 100 INJECTION, POWDER, FOR SOLUTION INTRAMUSCULAR; INTRAVENOUS at 06:33

## 2024-08-16 RX ADMIN — LACOSAMIDE 50 MG: 50 TABLET, FILM COATED ORAL at 08:32

## 2024-08-16 RX ADMIN — METOPROLOL TARTRATE 25 MG: 25 TABLET, FILM COATED ORAL at 08:32

## 2024-08-16 RX ADMIN — MUPIROCIN 1 APPLICATION: 20 OINTMENT TOPICAL at 21:51

## 2024-08-16 RX ADMIN — Medication 10 ML: at 21:50

## 2024-08-16 RX ADMIN — HYDRALAZINE HYDROCHLORIDE 10 MG: 20 INJECTION INTRAMUSCULAR; INTRAVENOUS at 06:33

## 2024-08-16 RX ADMIN — POTASSIUM CHLORIDE 40 MEQ: 1.5 POWDER, FOR SOLUTION ORAL at 08:32

## 2024-08-16 RX ADMIN — FOLIC ACID 1 MG: 1 TABLET ORAL at 08:32

## 2024-08-16 RX ADMIN — FLUOXETINE HYDROCHLORIDE 10 MG: 10 CAPSULE ORAL at 08:32

## 2024-08-16 RX ADMIN — HYDROMORPHONE HYDROCHLORIDE 0.5 MG: 1 INJECTION, SOLUTION INTRAMUSCULAR; INTRAVENOUS; SUBCUTANEOUS at 11:02

## 2024-08-16 RX ADMIN — FERROUS SULFATE TAB 325 MG (65 MG ELEMENTAL FE) 325 MG: 325 (65 FE) TAB at 08:32

## 2024-08-16 RX ADMIN — PIPERACILLIN SODIUM AND TAZOBACTAM SODIUM 3.38 G: 3; .375 INJECTION, POWDER, LYOPHILIZED, FOR SOLUTION INTRAVENOUS at 08:33

## 2024-08-16 RX ADMIN — INSULIN LISPRO 2 UNITS: 100 INJECTION, SOLUTION INTRAVENOUS; SUBCUTANEOUS at 17:31

## 2024-08-16 RX ADMIN — OXYCODONE HYDROCHLORIDE 5 MG: 5 TABLET ORAL at 09:35

## 2024-08-16 RX ADMIN — Medication 10 ML: at 21:51

## 2024-08-16 NOTE — PLAN OF CARE
Goal Outcome Evaluation:  Plan of Care Reviewed With: patient        Progress: no change       Problem: Pain Acute  Goal: Acceptable Pain Control and Functional Ability  Outcome: Ongoing, Progressing  Intervention: Prevent or Manage Pain  Recent Flowsheet Documentation  Taken 8/15/2024 2000 by Christal Morris RN  Sensory Stimulation Regulation:   care clustered   lighting decreased  Sleep/Rest Enhancement:   awakenings minimized   consistent schedule promoted   regular sleep/rest pattern promoted   relaxation techniques promoted  Medication Review/Management: medications reviewed  Intervention: Optimize Psychosocial Wellbeing  Recent Flowsheet Documentation  Taken 8/15/2024 2000 by Christal Morris RN  Supportive Measures: relaxation techniques promoted  Diversional Activities: television     Problem: Impaired Wound Healing  Goal: Optimal Wound Healing  Outcome: Ongoing, Progressing  Intervention: Promote Wound Healing  Recent Flowsheet Documentation  Taken 8/15/2024 2000 by Christal Morris RN  Activity Management: bedrest  Sleep/Rest Enhancement:   awakenings minimized   consistent schedule promoted   regular sleep/rest pattern promoted   relaxation techniques promoted     Problem: Adult Inpatient Plan of Care  Goal: Plan of Care Review  Outcome: Ongoing, Progressing  Flowsheets (Taken 8/16/2024 0600)  Progress: no change  Plan of Care Reviewed With: patient  Goal: Patient-Specific Goal (Individualized)  Outcome: Ongoing, Progressing  Goal: Absence of Hospital-Acquired Illness or Injury  Outcome: Ongoing, Progressing  Intervention: Identify and Manage Fall Risk  Recent Flowsheet Documentation  Taken 8/16/2024 0600 by Christal Morris RN  Safety Promotion/Fall Prevention:   safety round/check completed   fall prevention program maintained  Taken 8/16/2024 0100 by Christal Morris RN  Safety Promotion/Fall Prevention:   safety round/check completed   fall prevention program maintained  Taken 8/16/2024 0000 by Christal Morris RN  Safety  Promotion/Fall Prevention:   safety round/check completed   fall prevention program maintained  Taken 8/15/2024 2300 by Christal Morris RN  Safety Promotion/Fall Prevention:   safety round/check completed   fall prevention program maintained  Taken 8/15/2024 2200 by Christal Morris RN  Safety Promotion/Fall Prevention:   safety round/check completed   fall prevention program maintained  Taken 8/15/2024 2100 by Christal Morris RN  Safety Promotion/Fall Prevention:   safety round/check completed   fall prevention program maintained  Taken 8/15/2024 2000 by Christal Morris RN  Safety Promotion/Fall Prevention:   safety round/check completed   fall prevention program maintained  Taken 8/15/2024 1900 by Christal Morris RN  Safety Promotion/Fall Prevention:   safety round/check completed   fall prevention program maintained  Intervention: Prevent Skin Injury  Recent Flowsheet Documentation  Taken 8/16/2024 0600 by Christal Morris RN  Body Position:   turned   side-lying   left  Taken 8/16/2024 0000 by Christal Morris RN  Body Position:   turned   side-lying   left  Taken 8/15/2024 2200 by Christal Morris RN  Body Position:   turned   side-lying   right  Taken 8/15/2024 2000 by Christal Morris RN  Body Position:   turned   side-lying   left  Skin Protection:   adhesive use limited   incontinence pads utilized   skin-to-device areas padded   skin-to-skin areas padded   tubing/devices free from skin contact  Intervention: Prevent and Manage VTE (Venous Thromboembolism) Risk  Recent Flowsheet Documentation  Taken 8/15/2024 2000 by Christal Morris RN  Activity Management: bedrest  VTE Prevention/Management:   left   sequential compression devices on  Range of Motion: ROM (range of motion) performed  Intervention: Prevent Infection  Recent Flowsheet Documentation  Taken 8/15/2024 2000 by Christal Morris RN  Infection Prevention:   environmental surveillance performed   equipment surfaces disinfected   hand hygiene promoted   single patient room provided  Goal:  Optimal Comfort and Wellbeing  Outcome: Ongoing, Progressing  Intervention: Provide Person-Centered Care  Recent Flowsheet Documentation  Taken 8/15/2024 2000 by Christal Morris RN  Trust Relationship/Rapport:   care explained   reassurance provided  Goal: Readiness for Transition of Care  Outcome: Ongoing, Progressing     Problem: Fall Injury Risk  Goal: Absence of Fall and Fall-Related Injury  Outcome: Ongoing, Progressing  Intervention: Identify and Manage Contributors  Recent Flowsheet Documentation  Taken 8/15/2024 2000 by Christal Morris RN  Medication Review/Management: medications reviewed  Intervention: Promote Injury-Free Environment  Recent Flowsheet Documentation  Taken 8/16/2024 0600 by Christal Morris RN  Safety Promotion/Fall Prevention:   safety round/check completed   fall prevention program maintained  Taken 8/16/2024 0100 by Christal Morris RN  Safety Promotion/Fall Prevention:   safety round/check completed   fall prevention program maintained  Taken 8/16/2024 0000 by Christal Morris RN  Safety Promotion/Fall Prevention:   safety round/check completed   fall prevention program maintained  Taken 8/15/2024 2300 by Christal Morris RN  Safety Promotion/Fall Prevention:   safety round/check completed   fall prevention program maintained  Taken 8/15/2024 2200 by Christal Morris RN  Safety Promotion/Fall Prevention:   safety round/check completed   fall prevention program maintained  Taken 8/15/2024 2100 by Christal Morris RN  Safety Promotion/Fall Prevention:   safety round/check completed   fall prevention program maintained  Taken 8/15/2024 2000 by Christal Morris RN  Safety Promotion/Fall Prevention:   safety round/check completed   fall prevention program maintained  Taken 8/15/2024 1900 by Christal Morris RN  Safety Promotion/Fall Prevention:   safety round/check completed   fall prevention program maintained     Problem: Skin Injury Risk Increased  Goal: Skin Health and Integrity  Outcome: Ongoing, Progressing  Intervention:  Optimize Skin Protection  Recent Flowsheet Documentation  Taken 8/16/2024 0600 by Christal Morris RN  Head of Bed ([facility] in [place]) Positioning: HOB at 30-45 degrees  Taken 8/16/2024 0000 by Christal Morris RN  Head of Bed ([facility] in [place]) Positioning: HOB at 30-45 degrees  Taken 8/15/2024 2200 by Christal Morris RN  Head of Bed ([facility] in [place]) Positioning: HOB at 30-45 degrees  Taken 8/15/2024 2000 by Christal Morris RN  Pressure Reduction Techniques:   weight shift assistance provided   heels elevated off bed   positioned off wounds  Head of Bed ([facility] in [place]) Positioning: HOB at 30-45 degrees  Pressure Reduction Devices:   pressure-redistributing mattress utilized   positioning supports utilized   heel offloading device utilized  Skin Protection:   adhesive use limited   incontinence pads utilized   skin-to-device areas padded   skin-to-skin areas padded   tubing/devices free from skin contact     Problem: Behavioral Health Comorbidity  Goal: Maintenance of Behavioral Health Symptom Control  Outcome: Ongoing, Progressing  Intervention: Maintain Behavioral Health Symptom Control  Recent Flowsheet Documentation  Taken 8/15/2024 2000 by Christal Morris RN  Medication Review/Management: medications reviewed     Problem: Diabetes Comorbidity  Goal: Blood Glucose Level Within Targeted Range  Outcome: Ongoing, Progressing  Intervention: Monitor and Manage Glycemia  Recent Flowsheet Documentation  Taken 8/15/2024 2000 by Christal Morris RN  Glycemic Management: blood glucose monitored     Problem: Hypertension Comorbidity  Goal: Blood Pressure in Desired Range  Outcome: Ongoing, Progressing  Intervention: Maintain Blood Pressure Management  Recent Flowsheet Documentation  Taken 8/15/2024 2000 by Christal Morris RN  Medication Review/Management: medications reviewed     Problem: Adjustment to Illness (Sepsis/Septic Shock)  Goal: Optimal Coping  Outcome: Ongoing, Progressing  Intervention: Optimize Psychosocial Adjustment to Illness  Recent Flowsheet Documentation  Taken 8/15/2024  2000 by Ball, Christal, RN  Supportive Measures: relaxation techniques promoted  Family/Support System Care: support provided     Problem: Bleeding (Sepsis/Septic Shock)  Goal: Absence of Bleeding  Outcome: Ongoing, Progressing  Intervention: Monitor and Manage Bleeding  Recent Flowsheet Documentation  Taken 8/15/2024 2000 by Christal Morris RN  Bleeding Precautions:   monitored for signs of bleeding   blood pressure closely monitored  Bleeding Management: dressing monitored     Problem: Glycemic Control Impaired (Sepsis/Septic Shock)  Goal: Blood Glucose Level Within Desired Range  Outcome: Ongoing, Progressing  Intervention: Optimize Glycemic Control  Recent Flowsheet Documentation  Taken 8/15/2024 2000 by Christal Morris RN  Glycemic Management: blood glucose monitored     Problem: Infection Progression (Sepsis/Septic Shock)  Goal: Absence of Infection Signs and Symptoms  Outcome: Ongoing, Progressing  Intervention: Initiate Sepsis Management  Recent Flowsheet Documentation  Taken 8/15/2024 2000 by Christal Morris RN  Infection Management: aseptic technique maintained  Infection Prevention:   environmental surveillance performed   equipment surfaces disinfected   hand hygiene promoted   single patient room provided  Intervention: Promote Recovery  Recent Flowsheet Documentation  Taken 8/15/2024 2000 by Christal Morris RN  Activity Management: bedrest  Sleep/Rest Enhancement:   awakenings minimized   consistent schedule promoted   regular sleep/rest pattern promoted   relaxation techniques promoted  Intervention: Promote Stabilization  Recent Flowsheet Documentation  Taken 8/15/2024 2000 by Christal Morris RN  Fluid/Electrolyte Management: fluids provided     Problem: Nutrition Impaired (Sepsis/Septic Shock)  Goal: Optimal Nutrition Intake  Outcome: Ongoing, Progressing     Problem: Adjustment to Amputation (Lower Extremity Amputation)  Goal: Optimal Adjustment to Amputation  Outcome: Ongoing, Progressing  Intervention: Promote  Patient and Family Adjustment to Amputation  Recent Flowsheet Documentation  Taken 8/15/2024 2000 by Christal Morris RN  Supportive Measures: relaxation techniques promoted  Family/Support System Care: support provided     Problem: BADL (Basic Activities of Daily Living) Impairment (Lower Extremity Amputation)  Goal: Optimal Safe BADL Performance  Outcome: Ongoing, Progressing     Problem: IADL (Instrumental Activities of Daily Living) Impairment (Lower Extremity Amputation)  Goal: Optimal Safe IADL Performance  Outcome: Ongoing, Progressing     Problem: Lower Limb Care (Lower Extremity Amputation)  Goal: Effective Lower Limb Care  Outcome: Ongoing, Progressing     Problem: Mobility Impairment (Lower Extremity Amputation)  Goal: Optimal Mobility [place] and Safety  Outcome: Ongoing, Progressing     Problem: Pain and Hypersensitivity (Lower Extremity Amputation)  Goal: Acceptable Pain/Hypersensitivity Control  Outcome: Ongoing, Progressing

## 2024-08-16 NOTE — PROGRESS NOTES
Pulmonary and critical care progress note    Chief complaint -generalized fatigue      Subjective-overnight events reviewed.  All the labs medications ins and outs and vitals reviewed.  Patient resting in bed comfortably.  Not in any distress.  Blood sugars on the higher side  DCED hydrocortisone     Review of Systems  Not reliable as patient is a poor historian.  No changes.        History  Past Medical History:   Diagnosis Date    Arthritis     Closed fracture of right fibula and tibia 2018    Depression     Diabetic ulcer of left foot associated with type 2 diabetes mellitus 2017    Diastolic dysfunction     Disease of thyroid gland     Elevated cholesterol     End stage renal disease     Essential hypertension     GERD (gastroesophageal reflux disease)     History of transfusion     Hyperlipidemia     Iron deficiency anemia 2018    PAD (peripheral artery disease)     Renal failure     Type 2 diabetes mellitus with hyperglycemia, with long-term current use of insulin 2018   ,   Past Surgical History:   Procedure Laterality Date    ABDOMINAL SURGERY      ABOVE KNEE AMPUTATION Right 2024    Procedure: AMPUTATION ABOVE KNEE;  Surgeon: Angelo Santoro MD;  Location: [facility];  Service: General;  Laterality: Right;    BACK SURGERY      Post spinal diskectomy, osteophytectomy in one lumbar interspace    CATARACT EXTRACTION      Left      SECTION      DENTAL PROCEDURE      ENDOSCOPY      FRACTURE SURGERY      right leg    HYSTERECTOMY      INCISION AND DRAINAGE LEG Left 4/15/2017    Procedure: INCISION AND DRAINAGE LOWER EXTREMITY;  Surgeon: Basilio Morris MD;  Location: [facility] OR;  Service:     INCISION AND DRAINAGE LEG Left 2017    Procedure: Irrigation and Debridment abcess diabetic wound left foot with deep culture;  Surgeon: Basilio Morris MD;  Location: [facility] OR;  Service:     INSERTION PERITONEAL DIALYSIS CATHETER N/A 2021    Procedure: INSERTION PERITONEAL  DIALYSIS CATHETER LAPAROSCOPIC;  Surgeon: Edy Glover MD;  Location:  [facility] OR;  Service: General;  Laterality: N/A;    KNEE ARTHROSCOPY Right     ORIF TIBIA FRACTURE Right 12/28/2018    Procedure: TIBIAL  FRACTURE OPEN REDUCTION INTERNAL FIXATION;  Surgeon: Jung Barragan MD;  Location:  [facility] OR;  Service: Orthopedics    TOE AMPUTATION Right     5th    TUBAL ABDOMINAL LIGATION     ,   Family History   Problem Relation Age of Onset    Heart disease Mother     Cancer Mother     COPD Mother     Diabetes Other     Depression Other    ,   Social History     Tobacco Use    Smoking status: Never     Passive exposure: Never    Smokeless tobacco: Never   Vaping Use    Vaping status: Never Used   Substance Use Topics    Alcohol use: No    Drug use: No   ,   Medications Prior to Admission   Medication Sig Dispense Refill Last Dose    aspirin 81 MG EC tablet Take 1 tablet by mouth Daily.   8/2/2024    cefdinir (OMNICEF) 300 MG capsule Take 1 capsule by mouth Every Other Day.   8/2/2024    ferrous sulfate 325 (65 FE) MG tablet Take 1 tablet by mouth Daily With Breakfast.   8/2/2024    FLUoxetine (PROzac) 40 MG capsule Take 1 capsule by mouth Daily.   8/2/2024    fluticasone (FLONASE) 50 MCG/ACT nasal spray 2 sprays into the nostril(s) as directed by provider Daily As Needed for Rhinitis.   Past Week    folic acid (FOLVITE) 1 MG tablet Take 1 tablet by mouth Daily.   8/2/2024    HYDROcodone-acetaminophen (NORCO)  MG per tablet Take 1 tablet by mouth 3 (Three) Times a Day As Needed for Moderate Pain.   8/2/2024    hydrOXYzine (ATARAX) 25 MG tablet Take 1 tablet by mouth 3 (Three) Times a Day As Needed for Anxiety. 15 tablet 0 8/2/2024    Insulin Aspart, w/Niacinamide, (Fiasp) 100 UNIT/ML solution Inject 2-7 Units as directed 4 (Four) Times a Day Before Meals & at Bedtime.   8/2/2024    lacosamide (VIMPAT) 50 MG tablet tablet Take 1 tablet by mouth Every 12 (Twelve) Hours.   8/2/2024    levothyroxine (SYNTHROID,  LEVOTHROID) 100 MCG tablet Take 1 tablet by mouth Daily.   8/2/2024    lidocaine (LIDODERM) 5 % Place 1 patch on the skin as directed by provider Daily As Needed for Mild Pain. Remove & Discard patch within 12 hours or as directed by MD   Past Week    losartan (COZAAR) 50 MG tablet Take 1 tablet by mouth Daily.   8/2/2024    NIFEdipine XL (PROCARDIA XL) 90 MG 24 hr tablet Take 1 tablet by mouth Daily.   8/2/2024    ondansetron ODT (ZOFRAN-ODT) 4 MG disintegrating tablet Place 1 tablet on the tongue 3 (Three) Times a Day As Needed for Nausea or Vomiting.   Past Week    pantoprazole (PROTONIX) 40 MG EC tablet Take 1 tablet by mouth Daily.   8/2/2024    potassium chloride (KLOR-CON M20) 20 MEQ CR tablet Take 1 tablet by mouth 2 (Two) Times a Day.   8/2/2024    rOPINIRole (REQUIP) 0.5 MG tablet Take 1 tablet by mouth 2 (Two) Times a Day.   8/2/2024    tiZANidine (ZANAFLEX) 4 MG tablet Take 1 tablet by mouth Every 6 (Six) Hours As Needed for Muscle Spasms.   8/2/2024    atorvastatin (LIPITOR) 80 MG tablet Take 1 tablet by mouth Every Night. 30 tablet 0 8/1/2024    traZODone (DESYREL) 50 MG tablet Take 1 tablet by mouth Every Night.   8/1/2024   , Scheduled Meds:  [Held by provider] atorvastatin, 80 mg, Oral, Nightly  cholecalciferol, 50,000 Units, Oral, Q7 Days  ferrous sulfate, 325 mg, Oral, Daily With Breakfast  FLUoxetine, 10 mg, Oral, Daily  folic acid, 1 mg, Oral, Daily  hydrocortisone sodium succinate, 50 mg, Intravenous, Q6H  insulin lispro, 2-7 Units, Subcutaneous, 4x Daily AC & at Bedtime  lacosamide, 50 mg, Oral, Q12H  levothyroxine, 100 mcg, Oral, QAM AC  metoprolol tartrate, 25 mg, Oral, Q12H  mupirocin, 1 Application, Topical, Q12H  pantoprazole, 40 mg, Oral, QAM AC  piperacillin-tazobactam, 3.375 g, Intravenous, Q12H  sodium chloride, 1,000 mL, Intravenous, Once  sodium chloride, 10 mL, Intravenous, Q12H  sodium chloride, 10 mL, Intravenous, Q12H  sodium chloride, 10 mL, Intravenous, Q12H  sodium chloride,  10 mL, Intravenous, Q12H  sodium chloride, 10 mL, Intravenous, Q12H  traZODone, 25 mg, Oral, Nightly  Vancomycin Pharmacy Intermittent/Pulse Dosing, , Does not apply, Daily    , Continuous Infusions:      and Allergies:  Morphine, Penicillins, Codeine, and Sulfa antibiotics    Objective     Vital Signs   Temp:  [97.4 °F (36.3 °C)-98.2 °F (36.8 °C)] 97.9 °F (36.6 °C)  Heart Rate:  [64-93] 67  Resp:  [10-18] 16  BP: (136-187)/() 139/83    Physical Exam:             General-chronically ill in appearance  HEENT-PERRLA    Neck-supple    Respiratory-respirations normal-on auscultation no wheezing no crackles,    Cardiovascular-NSR  GI-NTND    CNS-nonfocal    Musculoskeletal -no edema, right above-knee amputation      Psychiatric-alert awake oriented  Skin- no visible rash                                                                   Results Review:    LABS:    Lab Results   Component Value Date    GLUCOSE 233 (H) 08/16/2024    BUN 24 (H) 08/16/2024    CREATININE 2.60 (H) 08/16/2024    EGFRIFNONA 8 (L) 02/06/2022    EGFRIFAFRI  02/06/2022      Comment:      <15 Indicative of kidney failure.    BCR 9.2 08/16/2024    CO2 20.1 (L) 08/16/2024    CALCIUM 7.2 (L) 08/16/2024    PROTENTOTREF 6.1 12/08/2020    ALBUMIN 2.5 (L) 08/16/2024    LABIL2 1.2 12/08/2020     (H) 08/16/2024    ALT 30 08/16/2024    WBC 3.56 08/16/2024    HGB 11.0 (L) 08/16/2024    HCT 31.5 (L) 08/16/2024    MCV 84.5 08/16/2024    PLT 74 (L) 08/16/2024     08/16/2024    K 3.0 (L) 08/16/2024    CL 98 08/16/2024    ANIONGAP 17.9 (H) 08/16/2024       Lab Results   Component Value Date    INR 0.88 (L) 08/15/2024    INR 1.13 (H) 08/14/2024    INR 1.20 (H) 08/14/2024    PROTIME 12.0 (L) 08/15/2024    PROTIME 14.6 08/14/2024    PROTIME 15.3 (H) 08/14/2024       Results from last 7 days   Lab Units 08/15/24  0901 08/14/24  2314 08/14/24  1521 08/14/24  0702 08/13/24  2154   INR  0.88* 1.13* 1.20* 1.46* 2.13*   APTT seconds  --  43.3*  --  96.4*  146.4*          I reviewed the patient's new clinical results.  I reviewed the patient's new imaging results and agree with the interpretation.  Microbiology Results (last 10 days)       Procedure Component Value - Date/Time    Fungus Culture - Body Fluid, Peritoneal Catheter [[id number]] Collected: 08/13/24 1253    Lab Status: Preliminary result Specimen: Body Fluid from Peritoneal Catheter Updated: 08/14/24 1908     Fungus Stain Final report     KOH/Calcofluor preparation Comment     Comment: KOH/Calcofluor preparation:  no fungus observed.       Narrative:      Performed at:  [address] [place] [address]  [id number]  : Mahendra Finney PhD, Phone:  [phone]    Body Fluid Culture - Body Fluid, Peritoneum [[id number]] Collected: 08/13/24 1253    Lab Status: Preliminary result Specimen: Body Fluid from Peritoneum Updated: 08/15/24 1300     Body Fluid Culture No growth at 2 days     Gram Stain WBCs seen      No organisms seen    Blood Culture - Blood, Arm, Left [[id number]]  (Normal) Collected: 08/12/24 1917    Lab Status: Preliminary result Specimen: Blood from Arm, Left Updated: 08/15/24 1931     Blood Culture No growth at 3 days    MRSA Screen, PCR (Inpatient) - Swab, Nares [[id number]]  (Normal) Collected: 08/11/24 1557    Lab Status: Final result Specimen: Swab from Nares Updated: 08/11/24 1716     MRSA PCR No MRSA Detected    Narrative:      The negative predictive value of this diagnostic test is high and should only be used to consider de-escalating anti-MRSA therapy. A positive result may indicate colonization with MRSA and must be correlated clinically.             Assessment & Plan     Neurology-alert awake able to protect her airway.  Continue to monitor neurological status    Respiratory-latest chest x-ray reviewed.  FiO2 requirement reviewed and adjusted to maintain saturation 88 to 92%.    Latest VBG reviewed.    Cardiology-   Septic shock-likely due to the foot ulcer and  then amputation.  Was on Levophed.  Titrated off.  Discontinued midodrine.  As patient's blood pressure was running on the higher side.  Hypertension-discontinued patient's midodrine.    Patient's blood pressure is remaining good.  Discontinue hydrocortisone if sugars were on the higher side.  Continue to monitor HR- rate and rhythm, BP         Lab 08/13/24  2154 08/13/24  0616 08/13/24  0005 08/12/24  1812 08/12/24  1312   LACTATE 1.5 2.5* 3.5* 2.3* 2.3*      Nephrology- Cr and BUN stable  I/O-reviewed.  Creatinine elevated.  Nephrology on case.  Case was briefly discussed with them.  Continue management as per them.  Continue fluids as per nephrology.    Ionized calcium remains on the lower side.  Recommend replating.    Vitamin D levels on the lower side.  Started replacing.  Labs in a.m.-ordered      GI- PPI prophylaxis  Continue current diet.  .  Transaminitis-likely due to shock liver.  AST and ALT are better.    Bleeding through central line-resolved      Narrative & Impression   PROCEDURE: Abdominal ultrasound evaluation performed on August 11, 2024.  Color flow imaging performed.     HISTORY: Elevated liver function tests.     COMPARISON: None.     FINDINGS:     Echogenic liver suggestive of fatty infiltration.  Small volume of free fluid adjacent to the liver consistent with  ascites.  Patent hepatic veins with appropriate direction of flow.  Patent main portal vein with appropriate direction of flow.  No hepatic mass  No dilated intrahepatic bile ducts  Sludge noted in the gallbladder lumen.  No gallbladder wall thickening or para cholecystic fluid.  No right renal hydronephrosis  No features of cholecystitis.     IMPRESSION:     1.  Echogenic liver suggestive of fatty infiltration.  2.  Patent main portal vein with appropriate direction of flow.  3.  Patent hepatic veins with appropriate direction of flow.  4.  Sludge noted in the gallbladder lumen.  5.  No features of cholecystitis.  6.  Small volume of  free fluid in the right upper quadrant consistent  with ascites.  7.  No hepatic mass or cyst  8.  No dilated intrahepatic bile ducts.          Hematology- CBC  Hb-transfuse for hemoglobin less than 7      Thrombocytopenia-if continues to drop recommend hematology consult.  If continues to drop will discontinue Zosyn.  ID sepsis and septic shock-likely due to the ulcer.  Status post amputation.    Culture-reviewed  And Antibiotics-continue.  Cultures reviewed    Endrocrinology- Maintain Blood sugar 140 -180   Latest Reference Range & Units 08/13/24 00:04 08/13/24 23:32   TSH Baseline 0.270 - 4.200 uIU/mL  3.580   Free T4 0.92 - 1.68 ng/dL 1.88 (H)    (H): Data is abnormally high    Repeat free T4 within normal limits.    Electrolytes-   Mag and phos       DVT prophylaxis-    Bedside rounds were done with RT and patient's nurse. All the lab and clinical findings were discussed with them and plan was also discussed in great detail.    Family member present-none    Overall prognosis poor.  Consulted palliative care team.    Echo-    Results for orders placed during the hospital encounter of 08/19/23    Adult Transthoracic Echo Complete W/ Cont if Necessary Per Protocol    Interpretation Summary    Left ventricular systolic function is normal. Left ventricular ejection fraction appears to be 56 - 60%.    Left ventricular diastolic function is consistent with (grade I) impaired relaxation.    Left atrial volume is mildly increased.    Moderate mitral valve stenosis is present.    Estimated right ventricular systolic pressure from tricuspid regurgitation is mildly elevated (35-45 mmHg).    Mild pulmonary hypertension is present.    There is no evidence of pericardial effusion.  Patient is currently critically ill due to HTN, bleeding, shock liver and ongoing sepsis.  I adjusted patient's medications.   Case discussed with patient's nurse and primary team.          Diabetic ulcer of right heel          Johnson Rider  MD  08/16/24  09:32 EDT

## 2024-08-16 NOTE — SIGNIFICANT NOTE
08/16/24 1547   OTHER   Discipline physical therapist   Rehab Time/Intention   Session Not Performed patient unavailable for treatment;unable to treat, medical status change   Recommendation   PT - Next Appointment   (Pt did respond to verbal stimuli this PM. Pt agreeable to PT treatment, however when asked to perform ankle pumps, no observable motion noted and pt was fairly lethargic and nodded off fairly quickly. Deferring treatment this date.)

## 2024-08-16 NOTE — PROGRESS NOTES
Nephrology Progress Note      Subjective   Patient was tearful and stated she is hurting all over.  Her oral intake is only slightly better  Objective       Vital signs :     Temp:  [97.4 °F (36.3 °C)-98.2 °F (36.8 °C)] 97.9 °F (36.6 °C)  Heart Rate:  [64-95] 68  Resp:  [10-18] 16  BP: (136-187)/() 157/89    Intake/Output                         08/14/24 0701 - 08/15/24 0700 08/15/24 0701 - 08/16/24 0700     [id number] 9636-7129 Total 9871-2197 6930-0985 Total                 Intake    P.O.  35  -- 35  240  -- 240    I.V.  798.8  926.3 1725  310  -- 310    Blood  665  568 1233  300  -- 300    Volume (Transfuse Fresh Frozen Plasma) 359 -- 359 -- -- --    Volume (Transfuse Fresh Frozen Plasma) 306 -- 306 -- -- --    Volume (Transfuse Pheresed Platelets) -- 268 268 -- -- --    Volume (Transfuse RBC Infuse Each Unit Over: 3.5H) -- 300 300 -- -- --    Volume (Transfuse RBC Infuse Each Unit Over: 3.5H) -- -- -- 300 -- 300    IV Piggyback  300  150 450  100  100 200    Total Intake 1798.8 1644.3 3443        Output    Urine  200  200 400  200  -- 200    Emesis/NG output  0  -- 0  --  -- --    Dialysis  --  -- --  594  178 772    Total Output 200 200 400 794 178 972             Physical Exam:    General Appearance : alert, pleasant, appears stated age, cooperative and oriented  Lungs : Bilateral decreased intensity of breath sounds, bibasilar crackles  Heart :  regular rhythm & normal rate, normal S1, S2 and no murmur, no rub  Abdomen : Soft, nondistended PD catheter site clear  Extremities : Bilateral 2+ upper extremity edema  Neurologic :   orientated to person, place, time and situation, Grossly no focal deficits        Laboratory Data :     Albumin Albumin   Date Value Ref Range Status   08/16/2024 2.5 (L) 3.5 - 5.2 g/dL Final   08/15/2024 2.9 (L) 3.5 - 5.2 g/dL Final   08/14/2024 2.2 (L) 3.5 - 5.2 g/dL Final   08/13/2024 2.0 (L) 3.5 - 5.2 g/dL Final        Magnesium Magnesium   Date Value Ref Range  "Status   08/16/2024 2.2 1.6 - 2.4 mg/dL Final   08/15/2024 1.3 (L) 1.6 - 2.4 mg/dL Final            PTH               No results found for: \"PTH\"    CBC and coagulation:  Results from last 7 days   Lab Units 08/16/24  0028 08/15/24  1116 08/15/24  0901 08/14/24  2314 08/14/24  1521 08/13/24  2329 08/13/24  2154 08/13/24  0616 08/13/24  0005 08/13/24  0004 08/12/24  1918   PROCALCITONIN ng/mL  --   --   --   --   --   --   --   --   --   --  74.29*   LACTATE mmol/L  --   --   --   --   --   --  1.5 2.5* 3.5*  --   --    CRP mg/dL  --   --   --   --   --   --   --   --   --   --  1.65*   WBC 10*3/mm3 3.56 4.07 3.97 4.31  --    < >  --   --   --    < >  --    HEMOGLOBIN g/dL 11.0* 11.2* 11.4* 6.3*  --    < >  --   --   --    < >  --    HEMATOCRIT % 31.5* 33.7* 33.6* 18.9*  --    < >  --   --   --    < >  --    MCV fL 84.5 88.9 85.1 93.1  --    < >  --   --   --    < >  --    MCHC g/dL 34.9 33.2 33.9 33.3  --    < >  --   --   --    < >  --    PLATELETS 10*3/mm3 74* 64* 61* 79*  --    < >  --   --   --    < >  --    INR   --   --  0.88* 1.13* 1.20*   < > 2.13*  --   --   --   --     < > = values in this interval not displayed.     Acid/base balance:  Results from last 7 days   Lab Units 08/14/24  0503 08/13/24  2217 08/11/24  1214   PH, ARTERIAL pH units 7.411 7.399 7.423   PO2 ART mm Hg 72.0* 77.2* 106.0   PCO2, ARTERIAL mm Hg 34.6* 36.0 28.7*   HCO3 ART mmol/L 21.9 22.2 18.7*     Renal and electrolytes:    Results from last 7 days   Lab Units 08/16/24  0646 08/16/24  0028 08/15/24  0901 08/14/24  2314 08/14/24  0702 08/13/24  2154 08/13/24  0745 08/13/24  0004 08/12/24  0312   SODIUM mmol/L 136 137 137  --  134* 134*  --    < > 136   POTASSIUM mmol/L 3.0* 3.3* 3.1*  --  3.7 3.8  --    < > 3.3*   MAGNESIUM mg/dL  --  2.2 1.3*  --   --   --   --   --  1.3*   CHLORIDE mmol/L 98 99 98  --  100 102  --    < > 104   CO2 mmol/L 20.1* 22.0 20.9*  --  18.5* 17.9*  --    < > 15.9*   BUN mg/dL 24* 25* 23  --  21 20  --    < > 18 "   CREATININE mg/dL 2.60* 2.61* 2.54*  --  3.03* 2.96*  --    < > 2.85*   CALCIUM mg/dL 7.2* 7.2* 7.4*  --  6.8* 6.6*  --    < > 5.7*   IONIZED CALCIUM mmol/L  --   --   --  0.95*  --   --  0.74*  --   --    PHOSPHORUS mg/dL  --  2.6 3.4  --   --   --   --   --   --     < > = values in this interval not displayed.     Estimated Creatinine Clearance: 15.8 mL/min (A) (by C-G formula based on SCr of 2.6 mg/dL (H)).  @GFRCG:3@   Liver and pancreatic function:  Results from last 7 days   Lab Units 08/16/24  0028 08/15/24  0901 08/14/24  0702 08/12/24 0312 08/11/24  1714   ALBUMIN g/dL 2.5* 2.9* 2.2*   < >  --    BILIRUBIN mg/dL 0.6 1.2 0.9   < >  --    ALK PHOS U/L 496* 422* 433*   < >  --    AST (SGOT) U/L 258* 400* 1,095*   < >  --    ALT (SGPT) U/L 30 32 73*   < >  --    AMMONIA umol/L  --   --   --   --  22    < > = values in this interval not displayed.         Cardiac:      Liver and pancreatic function:  Results from last 7 days   Lab Units 08/16/24  0028 08/15/24  0901 08/14/24  0702 08/12/24 0312 08/11/24  1714   ALBUMIN g/dL 2.5* 2.9* 2.2*   < >  --    BILIRUBIN mg/dL 0.6 1.2 0.9   < >  --    ALK PHOS U/L 496* 422* 433*   < >  --    AST (SGOT) U/L 258* 400* 1,095*   < >  --    ALT (SGPT) U/L 30 32 73*   < >  --    AMMONIA umol/L  --   --   --   --  22    < > = values in this interval not displayed.       Medications :     [Held by provider] atorvastatin, 80 mg, Oral, Nightly  cholecalciferol, 50,000 Units, Oral, Q7 Days  ferrous sulfate, 325 mg, Oral, Daily With Breakfast  FLUoxetine, 10 mg, Oral, Daily  folic acid, 1 mg, Oral, Daily  hydrocortisone sodium succinate, 50 mg, Intravenous, Q6H  insulin lispro, 2-7 Units, Subcutaneous, 4x Daily AC & at Bedtime  lacosamide, 50 mg, Oral, Q12H  levothyroxine, 100 mcg, Oral, QAM AC  metoprolol tartrate, 25 mg, Oral, Q12H  mupirocin, 1 Application, Topical, Q12H  pantoprazole, 40 mg, Oral, QAM AC  piperacillin-tazobactam, 3.375 g, Intravenous, Q12H  sodium chloride,  1,000 mL, Intravenous, Once  sodium chloride, 10 mL, Intravenous, Q12H  sodium chloride, 10 mL, Intravenous, Q12H  sodium chloride, 10 mL, Intravenous, Q12H  sodium chloride, 10 mL, Intravenous, Q12H  sodium chloride, 10 mL, Intravenous, Q12H  traZODone, 25 mg, Oral, Nightly  Vancomycin Pharmacy Intermittent/Pulse Dosing, , Does not apply, Daily                 Assessment & Plan       -End-stage renal disease on peritoneal dialysis  -Hypotension  -Anemia  -Hypokalemia  -Type 2 diabetes mellitus with renal involvement  - Persistent nausea and vomiting  - Cachexia and malnutrition    Peripheral edema/third spacing likely due to severe hypoalbuminemia.  Poor nutritional status also contributing towards hypoalbuminemia.  Will use 2.5% dialysate today to achieve ultrafiltration    Hypokalemia, better continue on oral potassi noted patient received 40 mEq of potassium today, to monitor    Metabolic acidosis, monitor    Poor nutritional status, will discuss in detail with RN emphasized on assisted feeding    Discussed with Dr. Javier    Discussed with RN    Please avoid nephrotoxic medication and pharmacy to adjust the medication according to the GFR.      Plan of care was discussed with the patient. Patient understood, verbalized, agreed.      Nicholas Arroyo MD  08/16/24  09:01 EDT

## 2024-08-16 NOTE — CASE MANAGEMENT/SOCIAL WORK
Discharge Planning Assessment   Jose Alfredo     Patient Name: Reny Elena  MRN: [id number]  Today's Date: 8/16/2024    Admit Date: 8/2/2024     Discharge Plan       Row Name 08/16/24 1354       Plan    Plan SS spoke with pt s/o Abbie and discussed discharge plannning. S/O states he plans for pt to return home at discharge. S/O states he is pt caregiver and declines short term rehab. Pt had a Right AKA on 8/9/24. Pt lives with significant other. Pt utilizes [facility]  for nursing services and will require at new home health order at discharge. Pt utilizes Peritoneal Dialysis  supplies via [facility], wheelchair, bedside commode, glucometer, bp cuff, shower chair, rollator, cane, standard walker via [facility]. Pt requesting Hospital bed at discharge. SS to follow and assist with discharge planning.               CAITY Dolan

## 2024-08-16 NOTE — PROGRESS NOTES
Pharmacokinetics Service Note:    Reny Elena is a 64 y.o. female being managed by Pharmacy for vancomycin dosing.    Indication for Therapy: sepsis  Current Regimen: Intermittent dosing due to renal function  Current Day of Therapy and Ordered Duration: Day 5 of 7 current therapy   Level Reported and Timing with Respect to Previous Dose: Random level of 25.5 mcg/mL at 0646 this morning    Assessment/Plan: Will not give a dose today and check another random in the morning.    Manuel Hammer PharmD  08/16/24  07:55 EDT

## 2024-08-16 NOTE — PROGRESS NOTES
"Palliative Care Daily Progress Note     S: Medical record reviewed, followed up with Primary RN Alice and Dr Woodruff regarding patient's condition. When I saw Reny this morning she was more awake and was still able to communicate, she was tearful saying that she hurt all over this morning, I told her that I would check with Alice to giver her something for pain. Reny was not short of breath and denied nausea at this time. She was not in distress.      O:   Palliative Performance Scale Score:     /76   Pulse 57   Temp 97.5 °F (36.4 °C) (Oral)   Resp 13   Ht 162.6 cm (64.02\")   Wt 47.1 kg (103 lb 13.4 oz)   LMP 05/26/2005   SpO2 99%   BMI 17.81 kg/m²     Intake/Output Summary (Last 24 hours) at 8/16/2024 1728  Last data filed at 8/16/2024 0651  Gross per 24 hour   Intake 250 ml   Output 378 ml   Net -128 ml       PE:  General Appearance:    Chronically ill appearing, alert, .cooperative, NAD   HEENT:    NC/AT, without obvious abnormality, EOMI, anicteric    Neck:   supple, trachea midline, no JVD   Lungs:     Unlabored respirations, no wheezing rhonchi or rales noted.    Heart:    RRR, normal S1 and S2, no M/R/G   Abdomen:     Soft, NT, ND, NABS    Extremities:   RT Aka with kerlex and ace bandage intact, edema in left lower extremity   Pulses:   Pulses palpable and equal bilaterally   Skin:   Warm, dry   Neurologic:   Awake, alert and oriented to person place not time, still able to answer questions appropriately today   Psych:   Tearful, flat         Meds: Reviewed and changes noted.    Labs:   Results from last 7 days   Lab Units 08/16/24  0028   WBC 10*3/mm3 3.56   HEMOGLOBIN g/dL 11.0*   HEMATOCRIT % 31.5*   PLATELETS 10*3/mm3 74*     Results from last 7 days   Lab Units 08/16/24  0646   SODIUM mmol/L 136   POTASSIUM mmol/L 3.0*   CHLORIDE mmol/L 98   CO2 mmol/L 20.1*   BUN mg/dL 24*   CREATININE mg/dL 2.60*   GLUCOSE mg/dL 233*   CALCIUM mg/dL 7.2*     Results from last 7 days   Lab Units " 08/16/24  0646 08/16/24  0028   SODIUM mmol/L 136 137   POTASSIUM mmol/L 3.0* 3.3*   CHLORIDE mmol/L 98 99   CO2 mmol/L 20.1* 22.0   BUN mg/dL 24* 25*   CREATININE mg/dL 2.60* 2.61*   CALCIUM mg/dL 7.2* 7.2*   BILIRUBIN mg/dL  --  0.6   ALK PHOS U/L  --  496*   ALT (SGPT) U/L  --  30   AST (SGOT) U/L  --  258*   GLUCOSE mg/dL 233* 216*     Imaging Results (Last 72 Hours)       Procedure Component Value Units Date/Time    CT Head Without Contrast [[id number]] Collected: 08/14/24 0059     Updated: 08/14/24 0101    Narrative:      Noncontrast CT brain     Indications: Altered mental status     Technique: Noncontrast CT images of the brain were obtained.  Limited  exposure control, adjustment of the MA and/or KV according to patient  size or use of an iterative reconstruction technique was utilized.     Findings CT brain:     Comparison is made to the prior study dated 4/28/2024.     Examination is somewhat limited by patient positioning.  Axial images  are not true brain axial images.  Allowing for this, there is no  evidence of acute intercranial hemorrhage.  The ventricles are  prominent, stable from the prior study.  Patchy and confluent areas of  hypodensity involve the periventricular deep white matter compatible  sequelae of chronic small vessel ischemic changes.  There is no  mass-effect or midline shift.  No abnormal extra-axial fluid collections  are demonstrated.       Impression:      Impression:     No CT evidence of an acute intracranial process.     This report was finalized on 8/14/2024 12:59 AM by Chin Chowdhury MD.       CT Chest Without Contrast Diagnostic [[id number]] Collected: 08/13/24 1955     Updated: 08/13/24 2010    Narrative:      Procedure: Contiguous axial images were obtained through the chest  abdomen and pelvis without intravenous contrast.  The use of sagittal  coronal reformations are supplied.     Comparison: CT abdomen 8/22/2024, chest 4/28/2024     Findings     CHEST: Chest is  well-expanded with diffuse groundglass attenuation  throughout all lobes.     A small left pleural effusion is present with airspace consolidation.     Trachea and mainstem bronchi are patent.     Central venous catheter present with distal tip at the atrial caval  junction.  Heart size within normal limits.     No adenopathy in the chest.     Cachexia and anasarca noted.     In bone windows, sternum, thoracic vertebral bodies and ribs are intact.   No pneumothorax.     ABDOMEN/PELVIS: Moderate ascites present.     Diffuse anasarca noted.  A catheter enters the mid abdomen, to the left  of midline with distal tip coiled in the left mid abdomen.     There may be fluid surrounding the gallbladder which is not  well-defined.     No extraluminal gas.  Urinary bladder distends normally.  A large amount  of free fluid in the pelvis.  Uterus is not identified.  A small amount  of formed stool present in the colon.  A discrete appendix is not  identified.     The noncontrast enhanced liver, spleen, stomach and adrenals are  morphologically unremarkable.  Kidneys are atrophic with calcified  vascular structures.     No dilated loops of bowel or bowel obstruction.     Above-the-knee amputation noted on the  view.     Moderate degenerative change at L5-S1.       Impression:         1.  Small to moderate left pleural effusion with adjacent airspace  consolidation suggesting atelectasis or pneumonia.     2.  Diffuse groundglass attenuation of the lungs compatible with  alveolitis.     3.   Mildly distended gallbladder with indistinct appearance.  If acute  cholecystitis is of clinical concern, right upper quadrant ultrasound  could be considered.     4.  Anasarca              This report was finalized on 8/13/2024 8:08 PM by Regina Magaña MD.       CT Abdomen Pelvis Without Contrast [[id number]] Collected: 08/13/24 1955     Updated: 08/13/24 2010    Narrative:      Procedure: Contiguous axial images were obtained through  the chest  abdomen and pelvis without intravenous contrast.  The use of sagittal  coronal reformations are supplied.     Comparison: CT abdomen 8/22/2024, chest 4/28/2024     Findings     CHEST: Chest is well-expanded with diffuse groundglass attenuation  throughout all lobes.     A small left pleural effusion is present with airspace consolidation.     Trachea and mainstem bronchi are patent.     Central venous catheter present with distal tip at the atrial caval  junction.  Heart size within normal limits.     No adenopathy in the chest.     Cachexia and anasarca noted.     In bone windows, sternum, thoracic vertebral bodies and ribs are intact.   No pneumothorax.     ABDOMEN/PELVIS: Moderate ascites present.     Diffuse anasarca noted.  A catheter enters the mid abdomen, to the left  of midline with distal tip coiled in the left mid abdomen.     There may be fluid surrounding the gallbladder which is not  well-defined.     No extraluminal gas.  Urinary bladder distends normally.  A large amount  of free fluid in the pelvis.  Uterus is not identified.  A small amount  of formed stool present in the colon.  A discrete appendix is not  identified.     The noncontrast enhanced liver, spleen, stomach and adrenals are  morphologically unremarkable.  Kidneys are atrophic with calcified  vascular structures.     No dilated loops of bowel or bowel obstruction.     Above-the-knee amputation noted on the  view.     Moderate degenerative change at L5-S1.       Impression:         1.  Small to moderate left pleural effusion with adjacent airspace  consolidation suggesting atelectasis or pneumonia.     2.  Diffuse groundglass attenuation of the lungs compatible with  alveolitis.     3.   Mildly distended gallbladder with indistinct appearance.  If acute  cholecystitis is of clinical concern, right upper quadrant ultrasound  could be considered.     4.  Anasarca              This report was finalized on 8/13/2024 8:08 PM  by Regina Magaña MD.                 Diagnostics: Reviewed    A: Reny Elena is a 64 y.o. female admitted on 8/2/2024 due to nausea, vomiting, with right foot pain and fever for a week before presenting to the ER. Reny has a medical history of arthritis, insulin dependent type II DM, chronic diastolic CHF, hypothyroidism, seizure disorder, HTN, HLD, GERD, iron deficiency anemia, PAD, and ESRD on PD, Reny was given an antibiotic at prior ER visit on 7/21/24 due to a UTI and she stated after a couple of days of antibiotic she began to experience nausea and vomiting, apparently she stopped taking the antibiotic and symptoms continued to persist. This visit in the ED Reny did complain of increased rt foot pain. Reny was tachycardic. 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 Pulmonary, Infectious disease, and Nephrology are all consulted on Ms Elena.   Today 8/16/2024  VS 97.5, HR 74, RR 14, BP of 127/76, saturating 98% on room air.      P:  Discussed Reny with Doreen in  and that Cliff had stated that he would be taking Reny home at discharge. She spoke with Cliff and he does plan to take her home at discharge.      We will continue to follow along. Please do not hesitate to contact us regarding further sx mgmt or GOC needs, including after hours or on weekends via our on call provider at [phone].     Jennifer Sanders, APRN    8/16/2024

## 2024-08-16 NOTE — PROGRESS NOTES
PROGRESS NOTE         Patient Identification:  Name:  Reny Elena  Age:  64 y.o.  Sex:  female  :  1960  MRN:  [id number]  Visit Number:  [id number]  Primary Care Provider:  Susan Stephens APRN         LOS: 13 days       ----------------------------------------------------------------------------------------------------------------------  Subjective       Chief Complaints:    Vomiting        Interval History:      Patient is sitting up in bed this morning.  Awake and alert remains confused and tearful.  On room air with no apparent distress.  Scattered ecchymosis to bilateral upper extremities with 2+ pitting edema.  WBC normal at 3.56.  Afebrile, no reported diarrhea.    Review of Systems:    EMMANUELLE due to confusion  ----------------------------------------------------------------------------------------------------------------------      Objective       Current Hospital Meds:  atorvastatin, 80 mg, Oral, Nightly  cholecalciferol, 50,000 Units, Oral, Q7 Days  ferrous sulfate, 325 mg, Oral, Daily With Breakfast  FLUoxetine, 10 mg, Oral, Daily  folic acid, 1 mg, Oral, Daily  insulin glargine, 6 Units, Subcutaneous, Daily  insulin lispro, 2-7 Units, Subcutaneous, 4x Daily AC & at Bedtime  lacosamide, 50 mg, Oral, Q12H  levothyroxine, 100 mcg, Oral, QAM AC  metoprolol tartrate, 25 mg, Oral, Q12H  mupirocin, 1 Application, Topical, Q12H  pantoprazole, 40 mg, Oral, QAM AC  piperacillin-tazobactam, 3.375 g, Intravenous, Q12H  sodium chloride, 1,000 mL, Intravenous, Once  sodium chloride, 10 mL, Intravenous, Q12H  sodium chloride, 10 mL, Intravenous, Q12H  sodium chloride, 10 mL, Intravenous, Q12H  sodium chloride, 10 mL, Intravenous, Q12H  sodium chloride, 10 mL, Intravenous, Q12H  traZODone, 25 mg, Oral, Nightly  Vancomycin Pharmacy Intermittent/Pulse Dosing, , Does not apply, Daily              ----------------------------------------------------------------------------------------------------------------------    Vital Signs:  Temp:  [97.4 °F (36.3 °C)-98.2 °F (36.8 °C)] 97.5 °F (36.4 °C)  Heart Rate:  [64-93] 73  Resp:  [10-18] 13  BP: (122-187)/() 122/78  Mean Arterial Pressure (Non-Invasive) for the past 24 hrs (Last 3 readings):   Noninvasive MAP (mmHg)   08/16/24 1000 96   08/16/24 0900 121   08/16/24 0800 136       SpO2 Percentage    08/16/24 0800 08/16/24 0900 08/16/24 1000   SpO2: 98% 98% 99%     SpO2:  [97 %-99 %] 99 %  on   ;   Device (Oxygen Therapy): room air    Body mass index is 17.81 kg/m².  Wt Readings from Last 3 Encounters:   08/15/24 47.1 kg (103 lb 13.4 oz)   07/26/24 45.4 kg (100 lb)   07/26/24 51.7 kg (114 lb)        Intake/Output Summary (Last 24 hours) at 8/16/2024 1046  Last data filed at 8/16/2024 0651  Gross per 24 hour   Intake 300 ml   Output 378 ml   Net -78 ml     Diet: Regular/House; Feeding Assistance - Nursing; Texture: Pureed (NDD 1); Fluid Consistency: Thin (IDDSI 0)  ----------------------------------------------------------------------------------------------------------------------      Physical Exam:    Constitutional: Frail elderly female resting quietly in bed.  Awake and alert.  Tearful.  HENT:  Head: Normocephalic and atraumatic.  Mouth:  Moist mucous membranes.    Eyes:  Conjunctivae and EOM are normal.  No scleral icterus.  Neck:  Neck supple.  No JVD present.    Cardiovascular:  Normal rate, regular rhythm and normal heart sounds with no murmur. No edema.  Pulmonary/Chest: Diminished to auscultation bilaterally no respiratory distress, no wheezes, no crackles, with normal breath sounds and good air movement.  Abdominal:  peritoneal dialysis catheter. Soft.  Bowel sounds are normal.  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  Neurological:  Alert and oriented to person, place, and  time.  No facial droop.  No slurred speech.    Skin: Bleeding noted to left IJ central line insertion site.  Right AKA stump is dressed, clean dry and intact      ----------------------------------------------------------------------------------------------------------------------  Results from last 7 days   Lab Units 08/11/24  0744   CK TOTAL U/L 90         Results from last 7 days   Lab Units 08/14/24  0503   PH, ARTERIAL pH units 7.411   PO2 ART mm Hg 72.0*   PCO2, ARTERIAL mm Hg 34.6*   HCO3 ART mmol/L 21.9     Results from last 7 days   Lab Units 08/16/24  0028 08/15/24  1116 08/15/24  0901 08/14/24  2314 08/14/24  1521 08/14/24  0702 08/13/24  2329 08/13/24  2154 08/13/24  0616 08/13/24  0005 08/13/24  0004 08/12/24  1918   CRP mg/dL  --   --   --   --   --   --   --   --   --   --   --  1.65*   LACTATE mmol/L  --   --   --   --   --   --   --  1.5 2.5* 3.5*  --   --    WBC 10*3/mm3 3.56 4.07 3.97 4.31  --  5.70   < >  --   --   --    < >  --    HEMOGLOBIN g/dL 11.0* 11.2* 11.4* 6.3*  --  9.2*   < >  --   --   --    < >  --    HEMATOCRIT % 31.5* 33.7* 33.6* 18.9*  --  26.8*   < >  --   --   --    < >  --    MCV fL 84.5 88.9 85.1 93.1  --  92.7   < >  --   --   --    < >  --    MCHC g/dL 34.9 33.2 33.9 33.3  --  34.3   < >  --   --   --    < >  --    PLATELETS 10*3/mm3 74* 64* 61* 79*  --  104*   < >  --   --   --    < >  --    INR   --   --  0.88* 1.13* 1.20* 1.46*  --  2.13*  --   --   --   --     < > = values in this interval not displayed.     Results from last 7 days   Lab Units 08/16/24  0646 08/16/24  0028 08/15/24  0901 08/14/24  0702 08/13/24  0004 08/12/24  0312   SODIUM mmol/L 136 137 137 134*   < > 136   POTASSIUM mmol/L 3.0* 3.3* 3.1* 3.7   < > 3.3*   MAGNESIUM mg/dL  --  2.2 1.3*  --   --  1.3*   CHLORIDE mmol/L 98 99 98 100   < > 104   CO2 mmol/L 20.1* 22.0 20.9* 18.5*   < > 15.9*   BUN mg/dL 24* 25* 23 21   < > 18   CREATININE mg/dL 2.60* 2.61* 2.54* 3.03*   < > 2.85*   CALCIUM mg/dL 7.2* 7.2*  "7.4* 6.8*   < > 5.7*   GLUCOSE mg/dL 233* 216* 163* 136*   < > 229*   ALBUMIN g/dL  --  2.5* 2.9* 2.2*   < > 1.4*   BILIRUBIN mg/dL  --  0.6 1.2 0.9   < > 0.3   ALK PHOS U/L  --  496* 422* 433*   < > 196*   AST (SGOT) U/L  --  258* 400* 1,095*   < > 2,747*   ALT (SGPT) U/L  --  30 32 73*   < > 322*    < > = values in this interval not displayed.   Estimated Creatinine Clearance: 15.8 mL/min (A) (by C-G formula based on SCr of 2.6 mg/dL (H)).  No results found for: \"AMMONIA\"      Glucose   Date/Time Value Ref Range Status   08/16/2024 0617 217 (H) 70 - 130 mg/dL Final   08/15/2024 2022 278 (H) 70 - 130 mg/dL Final   08/15/2024 1716 214 (H) 70 - 130 mg/dL Final   08/15/2024 1142 145 (H) 70 - 130 mg/dL Final   08/15/2024 0801 152 (H) 70 - 130 mg/dL Final   08/14/2024 2042 204 (H) 70 - 130 mg/dL Final   08/14/2024 1642 175 (H) 70 - 130 mg/dL Final   08/14/2024 1040 149 (H) 70 - 130 mg/dL Final     Lab Results   Component Value Date    HGBA1C 6.10 (H) 08/02/2024     Lab Results   Component Value Date    TSH 3.580 08/13/2024    FREET4 1.08 08/14/2024       Blood Culture   Date Value Ref Range Status   08/02/2024 No growth at 24 hours  Preliminary   08/02/2024 No growth at 24 hours  Preliminary     No results found for: \"URINECX\"  No results found for: \"WOUNDCX\"  No results found for: \"STOOLCX\"  No results found for: \"RESPCX\"  Pain Management Panel  More data exists         Latest Ref Rng & Units 7/21/2024 4/28/2024   Pain Management Panel   Amphetamine, Urine Qual Negative Negative  Negative    Barbiturates Screen, Urine Negative Negative  Negative    Benzodiazepine Screen, Urine Negative Negative  Negative    Buprenorphine, Screen, Urine Negative Negative  Negative    Cocaine Screen, Urine Negative Negative  Negative    Fentanyl, Urine Negative Negative  Negative    Methadone Screen , Urine Negative Negative  Negative    Methamphetamine, Ur Negative Negative  Negative       Details             "         ----------------------------------------------------------------------------------------------------------------------  Imaging Results (Last 24 Hours)       ** No results found for the last 24 hours. **            ----------------------------------------------------------------------------------------------------------------------    Pertinent Infectious Disease Results    Thank you Dr. Crawford for allowing us to participate in the care of your patient.  As you well know, Ms. Reny Elena is a 64 y.o. female with past medical history significant for seizure disorder, type 2 diabetes, arthritis, CHF, hypothyroidism, hypertension, hyperlipidemia, GERD, PAD, ESRD, iron deficiency anemia,, who presented to [facility] Emergency Department on 8/2/2024 for nausea, vomiting, diarrhea and worsening pain of right diabetic foot wound.  Patient was recently seen in our ED on 7/21/2024 with complaints of hallucinations, fever, and right foot pain.  At this time, patient was diagnosed with right heel ulcer and UTI.  She was sent home with an antibiotic but was unsure of the name.  Patient reported she started having nausea and vomiting after taking the antibiotics so she stopped taking them.  However, she continued to have nausea and vomiting.       Patient was tachycardic in the ED.Labs show K+ at upper limit of normal, BUN 30, cr 4.36, glucose 176, alk phos 323 and albumin 2.9, otherwise normal LFTs. CRP mildly elevated at 2.34, while lactate and WBC are normal. UA shows large leuk esterase and unable to determine RBC, WBC, bacteria of squamous cells due to loaded field.  Patient's urine culture from 7/21/2024 grew E. coli.  She had also been following with wound care who it obtained a wound culture on the same day.  This culture finalized with mixed gram-negative maurisio and light growth of Enterococcus faecalis.       X-ray of the right foot performed on 8/2/2024 revealed partial amputation of the fifth  metatarsal. Bony demineralization. No acute fracture or dislocation. No lytic or blastic bony lesions seen. Diffuse interphalangeal joint space loss. Mild degenerative changes in the midfoot. No cortical erosion. Fixation timur in the tibia/talus/calcaneus noted. Diffuse soft tissue swelling. Vascular calcifications. No soft tissue gas.  CT abdomen/pelvis revealed peritoneal dialysis catheter noted in the anterior right abdominal wall with the catheter noted to terminate in the left lateral pelvis. Small volume of free fluid in the right upper quadrant and lower posterior pelvis and right lower quadrant. Slightly elevated left hemidiaphragm. No bowel or renal obstruction. No abdominal aortic aneurysm. No features of acute appendicitis. Stranding identified in the presacral space could present changes related to mild distal colitis. Fluid-filled bladder with small volume of air in the bladder lumen. No pneumatosis. No conclusive features of free air. Distended configuration to the gallbladder likely due to fasting. No conclusive features of gastritis or enteritis.       Patient continued to have nausea and vomiting after 2 doses of Zofran.  She was admitted for further treatment.  Subsequently, patient was started on IV Rocephin in the setting of E. coli UTI.  Flagyl was added to cover possible colitis.  Patient was also started on IV vancomycin for Enterococcus faecalis wound infection.  General surgery consulted for possible debridement of patient's wound.     Assessment/Plan       Assessment     R foot wound infection, Enterococcus faecalis  Possible osteomyelitis of R foot  Hardware present in RLE  E. Coli UTI  Possible colitis         Plan        Patient is sitting up in bed this morning.  Awake and alert remains confused and tearful.  On room air with no apparent distress.  Scattered ecchymosis to bilateral upper extremities with 2+ pitting edema.  WBC normal at 3.56.  Afebrile, no reported  diarrhea.    Peritoneal body fluid culture shows no growth thus far.    In the setting of overall clinical and laboratory improvement vancomycin was discontinued.  Will continue Zosyn monotherapy for now.  We will continue to monitor closely and adjust antibiotic coverage as appropriate.      ANTIMICROBIAL THERAPY    cefdinir - 300 MG  piperacillin-tazobactam (ZOSYN) IVPB 3.375 g IVPB in 100 mL NS (VTB)  Vancomycin Pharmacy Intermittent/Pulse Dosing     Code Status:   Code Status and Medical Interventions: No CPR (Do Not Attempt to Resuscitate); Limited Support; No intubation (DNI)   Ordered at: 08/04/24 1645     Medical Intervention Limits:    No intubation (DNI)     Code Status (Patient has no pulse and is not breathing):    No CPR (Do Not Attempt to Resuscitate)     Medical Interventions (Patient has pulse or is breathing):    Limited Support       DARIUS Moreno  08/16/24  10:46 EDT

## 2024-08-16 NOTE — NURSING NOTE
Spoke with Gretel Thomas with [facility] during am rounds about elevated blood glucose.   She will notify Dr. Woodruff of glucose levels.

## 2024-08-16 NOTE — PLAN OF CARE
Problem: Pain Acute  Goal: Acceptable Pain Control and Functional Ability  Outcome: Ongoing, Progressing  Intervention: Prevent or Manage Pain  Recent Flowsheet Documentation  Taken 8/16/2024 1400 by Alice Bennett RN  Sensory Stimulation Regulation:   auditory stimulation provided   music on  Intervention: Optimize Psychosocial Wellbeing  Recent Flowsheet Documentation  Taken 8/16/2024 1400 by Alice Bennett RN  Supportive Measures:   verbalization of feelings encouraged   active listening utilized  Diversional Activities:   television   music     Problem: Impaired Wound Healing  Goal: Optimal Wound Healing  Outcome: Ongoing, Progressing  Intervention: Promote Wound Healing  Recent Flowsheet Documentation  Taken 8/16/2024 1800 by Alice Bennett RN  Oral Nutrition Promotion: (S/O contacted)   social interaction promoted   calorie-dense liquids provided     Problem: Adult Inpatient Plan of Care  Goal: Plan of Care Review  Outcome: Ongoing, Progressing  Goal: Patient-Specific Goal (Individualized)  Outcome: Ongoing, Progressing  Goal: Absence of Hospital-Acquired Illness or Injury  Outcome: Ongoing, Progressing  Intervention: Identify and Manage Fall Risk  Recent Flowsheet Documentation  Taken 8/16/2024 1800 by Alice Bennett RN  Safety Promotion/Fall Prevention: safety round/check completed  Taken 8/16/2024 1700 by Alice Bennett RN  Safety Promotion/Fall Prevention: safety round/check completed  Taken 8/16/2024 1600 by Alice Bennett RN  Safety Promotion/Fall Prevention: safety round/check completed  Taken 8/16/2024 1500 by Alice Bennett RN  Safety Promotion/Fall Prevention: safety round/check completed  Taken 8/16/2024 1400 by Alice Bennett RN  Safety Promotion/Fall Prevention: safety round/check completed  Taken 8/16/2024 1300 by Alice Bennett RN  Safety Promotion/Fall Prevention: safety round/check completed  Taken 8/16/2024 1200 by Alice Bennett RN  Safety Promotion/Fall  Prevention: safety round/check completed  Taken 8/16/2024 1100 by Alice Bennett RN  Safety Promotion/Fall Prevention: safety round/check completed  Intervention: Prevent Skin Injury  Recent Flowsheet Documentation  Taken 8/16/2024 1800 by Alice Bennett RN  Body Position:   right   side-lying  Taken 8/16/2024 1600 by Alice Bennett RN  Body Position:   left   side-lying  Skin Protection:   adhesive use limited   incontinence pads utilized   pulse oximeter probe site changed   tubing/devices free from skin contact  Taken 8/16/2024 1400 by Alice Bennett RN  Body Position:   right   side-lying  Taken 8/16/2024 1200 by Alcie Bennett RN  Body Position:   left   side-lying  Intervention: Prevent and Manage VTE (Venous Thromboembolism) Risk  Recent Flowsheet Documentation  Taken 8/16/2024 1600 by Alice Bennett RN  Range of Motion: ROM (range of motion) performed  Goal: Optimal Comfort and Wellbeing  Outcome: Ongoing, Progressing  Goal: Readiness for Transition of Care  Outcome: Ongoing, Progressing     Problem: Fall Injury Risk  Goal: Absence of Fall and Fall-Related Injury  Outcome: Ongoing, Progressing  Intervention: Promote Injury-Free Environment  Recent Flowsheet Documentation  Taken 8/16/2024 1800 by Alice Bennett RN  Safety Promotion/Fall Prevention: safety round/check completed  Taken 8/16/2024 1700 by Alice Bennett RN  Safety Promotion/Fall Prevention: safety round/check completed  Taken 8/16/2024 1600 by Alice Bennett RN  Safety Promotion/Fall Prevention: safety round/check completed  Taken 8/16/2024 1500 by Alice Bennett RN  Safety Promotion/Fall Prevention: safety round/check completed  Taken 8/16/2024 1400 by Alice Bennett RN  Safety Promotion/Fall Prevention: safety round/check completed  Taken 8/16/2024 1300 by Alice Bennett RN  Safety Promotion/Fall Prevention: safety round/check completed  Taken 8/16/2024 1200 by Alice Bennett RN  Safety Promotion/Fall  Prevention: safety round/check completed  Taken 8/16/2024 1100 by Alice Bennett RN  Safety Promotion/Fall Prevention: safety round/check completed     Problem: Skin Injury Risk Increased  Goal: Skin Health and Integrity  Outcome: Ongoing, Progressing  Intervention: Promote and Optimize Oral Intake  Recent Flowsheet Documentation  Taken 8/16/2024 1800 by Alice Bennett RN  Oral Nutrition Promotion: (S/O contacted)   social interaction promoted   calorie-dense liquids provided  Intervention: Optimize Skin Protection  Recent Flowsheet Documentation  Taken 8/16/2024 1800 by Alice Bennett RN  Head of Bed (HOB) Positioning: HOB at 30-45 degrees  Taken 8/16/2024 1600 by Alice Bennett RN  Pressure Reduction Techniques:   heels elevated off bed   weight shift assistance provided  Head of Bed (HOB) Positioning: HOB at 30-45 degrees  Pressure Reduction Devices:   feet on footrest/footstool   foam padding utilized   specialty bed utilized   heel offloading device utilized  Skin Protection:   adhesive use limited   incontinence pads utilized   pulse oximeter probe site changed   tubing/devices free from skin contact  Taken 8/16/2024 1400 by Alice Bennett RN  Head of Bed (HOB) Positioning: HOB at 30-45 degrees  Taken 8/16/2024 1200 by Alice Bennett RN  Head of Bed (HOB) Positioning: HOB at 30-45 degrees     Problem: Behavioral Health Comorbidity  Goal: Maintenance of Behavioral Health Symptom Control  Outcome: Ongoing, Progressing     Problem: Diabetes Comorbidity  Goal: Blood Glucose Level Within Targeted Range  Outcome: Ongoing, Progressing  Intervention: Monitor and Manage Glycemia  Recent Flowsheet Documentation  Taken 8/16/2024 1800 by Alice Bennett RN  Glycemic Management: blood glucose monitored     Problem: Hypertension Comorbidity  Goal: Blood Pressure in Desired Range  Outcome: Ongoing, Progressing     Problem: Adjustment to Illness (Sepsis/Septic Shock)  Goal: Optimal Coping  Outcome:  Ongoing, Progressing  Intervention: Optimize Psychosocial Adjustment to Illness  Recent Flowsheet Documentation  Taken 8/16/2024 1400 by Alice Bennett, RN  Supportive Measures:   verbalization of feelings encouraged   active listening utilized     Problem: Bleeding (Sepsis/Septic Shock)  Goal: Absence of Bleeding  Outcome: Ongoing, Progressing     Problem: Glycemic Control Impaired (Sepsis/Septic Shock)  Goal: Blood Glucose Level Within Desired Range  Outcome: Ongoing, Progressing  Intervention: Optimize Glycemic Control  Recent Flowsheet Documentation  Taken 8/16/2024 1800 by Alice Bennett RN  Glycemic Management: blood glucose monitored     Problem: Infection Progression (Sepsis/Septic Shock)  Goal: Absence of Infection Signs and Symptoms  Outcome: Ongoing, Progressing  Intervention: Promote Stabilization  Recent Flowsheet Documentation  Taken 8/16/2024 1800 by Alice Bennett RN  Fluid/Electrolyte Management: fluids provided     Problem: Nutrition Impaired (Sepsis/Septic Shock)  Goal: Optimal Nutrition Intake  Outcome: Ongoing, Progressing  Intervention: Promote and Optimize Nutrition Delivery  Recent Flowsheet Documentation  Taken 8/16/2024 1800 by Alice Bennett, RN  Nutrition Support Management: weight trending reviewed     Problem: Adjustment to Amputation (Lower Extremity Amputation)  Goal: Optimal Adjustment to Amputation  Outcome: Ongoing, Progressing  Intervention: Promote Patient and Family Adjustment to Amputation  Recent Flowsheet Documentation  Taken 8/16/2024 1400 by Alice Bennett, RN  Supportive Measures:   verbalization of feelings encouraged   active listening utilized     Problem: BADL (Basic Activities of Daily Living) Impairment (Lower Extremity Amputation)  Goal: Optimal Safe BADL Performance  Outcome: Ongoing, Progressing     Problem: IADL (Instrumental Activities of Daily Living) Impairment (Lower Extremity Amputation)  Goal: Optimal Safe IADL Performance  Outcome: Ongoing,  Progressing     Problem: Lower Limb Care (Lower Extremity Amputation)  Goal: Effective Lower Limb Care  Outcome: Ongoing, Progressing     Problem: Mobility Impairment (Lower Extremity Amputation)  Goal: Optimal Mobility [place] and Safety  Outcome: Ongoing, Progressing     Problem: Pain and Hypersensitivity (Lower Extremity Amputation)  Goal: Acceptable Pain/Hypersensitivity Control  Outcome: Ongoing, Progressing   Goal Outcome Evaluation:

## 2024-08-16 NOTE — PROGRESS NOTES
[facility] HOSPITALIST PROGRESS NOTE     Patient Identification:  Name:  Reny Elena  Age:  64 y.o.  Sex:  female  :  1960  MRN:  [id number]  Visit Number:  [id number]  Primary Care Provider:  Susan Stephens APRN    Length of stay:  13    Subjective: Patient seen and examined, she is complaining of diffuse pain, she is getting oxycodone 5 mg and is not controlling her symptoms, cautiously ordered 1 dose of Dilaudid 0.5 mg, she is not becoming hyperglycemic can restart continuous, it was held due to hypoglycemia.  Cultures so far is negative, clinically she is improving except for her biggest issue which is nutrition, anasarca from hypoalbuminemia, debility.    Chief Complaint: Septic shock  ----------------------------------------------------------------------------------------------------------------------  Current Hospital Meds:  atorvastatin, 80 mg, Oral, Nightly  cholecalciferol, 50,000 Units, Oral, Q7 Days  ferrous sulfate, 325 mg, Oral, Daily With Breakfast  FLUoxetine, 10 mg, Oral, Daily  folic acid, 1 mg, Oral, Daily  HYDROmorphone, 0.5 mg, Intravenous, Once  insulin glargine, 6 Units, Subcutaneous, Daily  insulin lispro, 2-7 Units, Subcutaneous, 4x Daily AC & at Bedtime  lacosamide, 50 mg, Oral, Q12H  levothyroxine, 100 mcg, Oral, QAM AC  metoprolol tartrate, 25 mg, Oral, Q12H  mupirocin, 1 Application, Topical, Q12H  pantoprazole, 40 mg, Oral, QAM AC  piperacillin-tazobactam, 3.375 g, Intravenous, Q12H  sodium chloride, 1,000 mL, Intravenous, Once  sodium chloride, 10 mL, Intravenous, Q12H  sodium chloride, 10 mL, Intravenous, Q12H  sodium chloride, 10 mL, Intravenous, Q12H  sodium chloride, 10 mL, Intravenous, Q12H  sodium chloride, 10 mL, Intravenous, Q12H  traZODone, 25 mg, Oral, Nightly         ----------------------------------------------------------------------------------------------------------------------  Vital Signs:  Temp:  [97.4 °F (36.3 °C)-98.2 °F (36.8 °C)]  97.5 °F (36.4 °C)  Heart Rate:  [64-93] 73  Resp:  [10-18] 13  BP: (122-187)/() 122/78       Tele: Sinus rhythm 73 bpm      08/13/24  0000 08/14/24  0630 08/15/24  0500   Weight: 42.5 kg (93 lb 12.8 oz) 46.5 kg (102 lb 8.2 oz) 47.1 kg (103 lb 13.4 oz)     Body mass index is 17.81 kg/m².    Intake/Output Summary (Last 24 hours) at 8/16/2024 1051  Last data filed at 8/16/2024 0651  Gross per 24 hour   Intake 300 ml   Output 378 ml   Net -78 ml     Diet: Regular/House; Feeding Assistance - Nursing; Texture: Pureed (NDD 1); Fluid Consistency: Thin (IDDSI 0)  ----------------------------------------------------------------------------------------------------------------------  Physical exam:  General: Chronically ill-appearing, awake and conversant, she appears uncomfortable.  She is not confused.  No respiratory distress.    Skin:  Skin is warm and dry. No rash noted. No pallor.    HENT:  Head:  Normocephalic and atraumatic.  Mouth:  Moist mucous membranes.  Edentulous  Eyes:  Conjunctivae and EOM are normal.  Pupils are equal, round, and reactive to light.  No scleral icterus.    Neck:  Neck supple.  No JVD present.    Pulmonary/Chest:  No respiratory distress, no wheezes, no crackles, with normal breath sounds and good air movement.  Cardiovascular:  Normal rate, regular rhythm and normal heart sounds with no murmur.  Abdominal: PD in place, anasarca, soft.  Bowel sounds are normal.  No distension and no tenderness.   Extremities: Pitting edema with anasarca upper and lower extremity.  No cyanosis or clubbing.   Neurological: She has no motor deficit, no slurring of speech,   Genitourinary: No Doshi catheter  Back:  ----------------------------------------------------------------------------------------------------------------------  ----------------------------------------------------------------------------------------------------------------------  Results from last 7 days   Lab Units 08/11/24  0744   CK  TOTAL U/L 90     Results from last 7 days   Lab Units 08/16/24  0028 08/15/24  1116 08/15/24  0901 08/14/24  2314 08/14/24  1521 08/14/24  0702 08/13/24  2329 08/13/24  2154 08/13/24  0616 08/13/24  0005 08/13/24  0004 08/12/24  1918   CRP mg/dL  --   --   --   --   --   --   --   --   --   --   --  1.65*   LACTATE mmol/L  --   --   --   --   --   --   --  1.5 2.5* 3.5*  --   --    WBC 10*3/mm3 3.56 4.07 3.97 4.31  --  5.70   < >  --   --   --    < >  --    HEMOGLOBIN g/dL 11.0* 11.2* 11.4* 6.3*  --  9.2*   < >  --   --   --    < >  --    HEMATOCRIT % 31.5* 33.7* 33.6* 18.9*  --  26.8*   < >  --   --   --    < >  --    MCV fL 84.5 88.9 85.1 93.1  --  92.7   < >  --   --   --    < >  --    MCHC g/dL 34.9 33.2 33.9 33.3  --  34.3   < >  --   --   --    < >  --    PLATELETS 10*3/mm3 74* 64* 61* 79*  --  104*   < >  --   --   --    < >  --    INR   --   --  0.88* 1.13* 1.20* 1.46*  --  2.13*  --   --   --   --     < > = values in this interval not displayed.     Results from last 7 days   Lab Units 08/14/24  0503   PH, ARTERIAL pH units 7.411   PO2 ART mm Hg 72.0*   PCO2, ARTERIAL mm Hg 34.6*   HCO3 ART mmol/L 21.9     Results from last 7 days   Lab Units 08/16/24  0646 08/16/24  0028 08/15/24  0901 08/14/24  0702 08/13/24  0004 08/12/24  0312   SODIUM mmol/L 136 137 137 134*   < > 136   POTASSIUM mmol/L 3.0* 3.3* 3.1* 3.7   < > 3.3*   MAGNESIUM mg/dL  --  2.2 1.3*  --   --  1.3*   CHLORIDE mmol/L 98 99 98 100   < > 104   CO2 mmol/L 20.1* 22.0 20.9* 18.5*   < > 15.9*   BUN mg/dL 24* 25* 23 21   < > 18   CREATININE mg/dL 2.60* 2.61* 2.54* 3.03*   < > 2.85*   CALCIUM mg/dL 7.2* 7.2* 7.4* 6.8*   < > 5.7*   GLUCOSE mg/dL 233* 216* 163* 136*   < > 229*   ALBUMIN g/dL  --  2.5* 2.9* 2.2*   < > 1.4*   BILIRUBIN mg/dL  --  0.6 1.2 0.9   < > 0.3   ALK PHOS U/L  --  496* 422* 433*   < > 196*   AST (SGOT) U/L  --  258* 400* 1,095*   < > 2,747*   ALT (SGPT) U/L  --  30 32 73*   < > 322*    < > = values in this interval not  "displayed.   Estimated Creatinine Clearance: 15.8 mL/min (A) (by C-G formula based on SCr of 2.6 mg/dL (H)).    No results found for: \"AMMONIA\"      Blood Culture   Date Value Ref Range Status   08/12/2024 No growth at 3 days  Preliminary     No results found for: \"URINECX\"  No results found for: \"WOUNDCX\"  No results found for: \"STOOLCX\"    I have personally looked at the labs and they are summarized above.  ----------------------------------------------------------------------------------------------------------------------  Imaging Results (Last 24 Hours)       ** No results found for the last 24 hours. 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  Sepsis + - dilution, HIT workup pending  -Anasarca  -Hypertension suboptimal control      Cautiously start low-dose Lantus, continue Accu-Chek and sliding scale, PT OT, aspiration precaution, discussed with nephrology today will change dialysate to help with significant anasarca.  Patient now has peripheral IV access will discontinue central line.  For hyperlipidemia, Lipitor was held temporarily due to acute hepatitis, LFTs is now normal, will restart Lipitor.  Adjust blood pressure regimen.  Pulmonary critical care service, " nephrology and infectious disease help appreciated.    Patient may benefit [facility]    Total time spent on this encounter is 32 minutes.    Disposition pending, may benefit continued care      Rebeka Woodruff MD  08/16/24  10:51 EDT

## 2024-08-17 ENCOUNTER — APPOINTMENT (OUTPATIENT)
Dept: CT IMAGING | Facility: HOSPITAL | Age: 64
DRG: 853 | End: 2024-08-17
Payer: MEDICARE

## 2024-08-17 LAB
027 TOXIN: NORMAL
ANION GAP SERPL CALCULATED.3IONS-SCNC: 11.6 MMOL/L (ref 5–15)
ANION GAP SERPL CALCULATED.3IONS-SCNC: 14.5 MMOL/L (ref 5–15)
ANISOCYTOSIS BLD QL: NORMAL
BACTERIA SPEC AEROBE CULT: NORMAL
BASOPHILS # BLD AUTO: 0 10*3/MM3 (ref 0–0.2)
BASOPHILS NFR BLD AUTO: 0 % (ref 0–1.5)
BUN SERPL-MCNC: 27 MG/DL (ref 8–23)
BUN SERPL-MCNC: 28 MG/DL (ref 8–23)
BUN/CREAT SERPL: 11 (ref 7–25)
BUN/CREAT SERPL: 11.2 (ref 7–25)
BURR CELLS BLD QL SMEAR: NORMAL
C DIFF TOX GENS STL QL NAA+PROBE: NEGATIVE
CALCIUM SPEC-SCNC: 7 MG/DL (ref 8.6–10.5)
CALCIUM SPEC-SCNC: 7 MG/DL (ref 8.6–10.5)
CHLORIDE SERPL-SCNC: 101 MMOL/L (ref 98–107)
CHLORIDE SERPL-SCNC: 102 MMOL/L (ref 98–107)
CO2 SERPL-SCNC: 21.4 MMOL/L (ref 22–29)
CO2 SERPL-SCNC: 21.5 MMOL/L (ref 22–29)
CREAT SERPL-MCNC: 2.41 MG/DL (ref 0.57–1)
CREAT SERPL-MCNC: 2.55 MG/DL (ref 0.57–1)
DEPRECATED RDW RBC AUTO: 50.2 FL (ref 37–54)
EGFRCR SERPLBLD CKD-EPI 2021: 20.2 ML/MIN/1.73
EGFRCR SERPLBLD CKD-EPI 2021: 21.7 ML/MIN/1.73
ELLIPTOCYTES BLD QL SMEAR: NORMAL
EOSINOPHIL # BLD AUTO: 0 10*3/MM3 (ref 0–0.4)
EOSINOPHIL NFR BLD AUTO: 0 % (ref 0.3–6.2)
ERYTHROCYTE [DISTWIDTH] IN BLOOD BY AUTOMATED COUNT: 16.9 % (ref 12.3–15.4)
GLUCOSE BLDC GLUCOMTR-MCNC: 108 MG/DL (ref 70–130)
GLUCOSE BLDC GLUCOMTR-MCNC: 194 MG/DL (ref 70–130)
GLUCOSE BLDC GLUCOMTR-MCNC: 210 MG/DL (ref 70–130)
GLUCOSE BLDC GLUCOMTR-MCNC: 286 MG/DL (ref 70–130)
GLUCOSE SERPL-MCNC: 163 MG/DL (ref 65–99)
GLUCOSE SERPL-MCNC: 311 MG/DL (ref 65–99)
HCT VFR BLD AUTO: 29.7 % (ref 34–46.6)
HGB BLD-MCNC: 10.5 G/DL (ref 12–15.9)
HYPOCHROMIA BLD QL: NORMAL
IMM GRANULOCYTES # BLD AUTO: 0.02 10*3/MM3 (ref 0–0.05)
IMM GRANULOCYTES NFR BLD AUTO: 0.5 % (ref 0–0.5)
LYMPHOCYTES # BLD AUTO: 0.36 10*3/MM3 (ref 0.7–3.1)
LYMPHOCYTES NFR BLD AUTO: 9.5 % (ref 19.6–45.3)
MCH RBC QN AUTO: 29.3 PG (ref 26.6–33)
MCHC RBC AUTO-ENTMCNC: 35.4 G/DL (ref 31.5–35.7)
MCV RBC AUTO: 83 FL (ref 79–97)
MONOCYTES # BLD AUTO: 0.27 10*3/MM3 (ref 0.1–0.9)
MONOCYTES NFR BLD AUTO: 7.1 % (ref 5–12)
NEUTROPHILS NFR BLD AUTO: 3.15 10*3/MM3 (ref 1.7–7)
NEUTROPHILS NFR BLD AUTO: 82.9 % (ref 42.7–76)
NRBC BLD AUTO-RTO: 0 /100 WBC (ref 0–0.2)
PLATELET # BLD AUTO: 41 10*3/MM3 (ref 140–450)
PMV BLD AUTO: 10.6 FL (ref 6–12)
POTASSIUM SERPL-SCNC: 2.3 MMOL/L (ref 3.5–5.2)
POTASSIUM SERPL-SCNC: 3.4 MMOL/L (ref 3.5–5.2)
RBC # BLD AUTO: 3.58 10*6/MM3 (ref 3.77–5.28)
SMALL PLATELETS BLD QL SMEAR: NORMAL
SODIUM SERPL-SCNC: 134 MMOL/L (ref 136–145)
SODIUM SERPL-SCNC: 138 MMOL/L (ref 136–145)
SRA .2 IU/ML UFH SER-ACNC: 1 % (ref 0–20)
SRA 100IU/ML UFH SER-ACNC: 3 % (ref 0–20)
SRA UFH SER-IMP: NORMAL
WBC NRBC COR # BLD AUTO: 3.8 10*3/MM3 (ref 3.4–10.8)

## 2024-08-17 PROCEDURE — 99233 SBSQ HOSP IP/OBS HIGH 50: CPT | Performed by: NURSE PRACTITIONER

## 2024-08-17 PROCEDURE — 74176 CT ABD & PELVIS W/O CONTRAST: CPT | Performed by: RADIOLOGY

## 2024-08-17 PROCEDURE — 25010000002 ONDANSETRON PER 1 MG: Performed by: FAMILY MEDICINE

## 2024-08-17 PROCEDURE — 94761 N-INVAS EAR/PLS OXIMETRY MLT: CPT

## 2024-08-17 PROCEDURE — 82948 REAGENT STRIP/BLOOD GLUCOSE: CPT

## 2024-08-17 PROCEDURE — 25010000002 POTASSIUM CHLORIDE 10 MEQ/100ML SOLUTION: Performed by: HOSPITALIST

## 2024-08-17 PROCEDURE — 87493 C DIFF AMPLIFIED PROBE: CPT | Performed by: FAMILY MEDICINE

## 2024-08-17 PROCEDURE — 74176 CT ABD & PELVIS W/O CONTRAST: CPT

## 2024-08-17 PROCEDURE — 99233 SBSQ HOSP IP/OBS HIGH 50: CPT | Performed by: FAMILY MEDICINE

## 2024-08-17 PROCEDURE — 85025 COMPLETE CBC W/AUTO DIFF WBC: CPT | Performed by: HOSPITALIST

## 2024-08-17 PROCEDURE — 94799 UNLISTED PULMONARY SVC/PX: CPT

## 2024-08-17 PROCEDURE — 63710000001 INSULIN LISPRO (HUMAN) PER 5 UNITS: Performed by: NURSE PRACTITIONER

## 2024-08-17 PROCEDURE — 63710000001 INSULIN GLARGINE PER 5 UNITS: Performed by: HOSPITALIST

## 2024-08-17 PROCEDURE — 99233 SBSQ HOSP IP/OBS HIGH 50: CPT | Performed by: INTERNAL MEDICINE

## 2024-08-17 PROCEDURE — 85007 BL SMEAR W/DIFF WBC COUNT: CPT | Performed by: HOSPITALIST

## 2024-08-17 PROCEDURE — 80048 BASIC METABOLIC PNL TOTAL CA: CPT | Performed by: HOSPITALIST

## 2024-08-17 RX ORDER — ONDANSETRON 2 MG/ML
4 INJECTION INTRAMUSCULAR; INTRAVENOUS EVERY 6 HOURS PRN
Status: DISCONTINUED | OUTPATIENT
Start: 2024-08-17 | End: 2024-08-21 | Stop reason: HOSPADM

## 2024-08-17 RX ORDER — HYDROMORPHONE HYDROCHLORIDE 1 MG/ML
0.5 INJECTION, SOLUTION INTRAMUSCULAR; INTRAVENOUS; SUBCUTANEOUS EVERY 4 HOURS PRN
Status: DISCONTINUED | OUTPATIENT
Start: 2024-08-17 | End: 2024-08-21 | Stop reason: HOSPADM

## 2024-08-17 RX ORDER — POTASSIUM CHLORIDE 7.45 MG/ML
10 INJECTION INTRAVENOUS
Status: COMPLETED | OUTPATIENT
Start: 2024-08-17 | End: 2024-08-17

## 2024-08-17 RX ORDER — POTASSIUM CHLORIDE 1.5 G/1.58G
40 POWDER, FOR SOLUTION ORAL ONCE
Status: COMPLETED | OUTPATIENT
Start: 2024-08-17 | End: 2024-08-17

## 2024-08-17 RX ADMIN — FLUOXETINE HYDROCHLORIDE 10 MG: 10 CAPSULE ORAL at 08:56

## 2024-08-17 RX ADMIN — POTASSIUM CHLORIDE 10 MEQ: 7.46 INJECTION, SOLUTION INTRAVENOUS at 03:30

## 2024-08-17 RX ADMIN — FERROUS SULFATE TAB 325 MG (65 MG ELEMENTAL FE) 325 MG: 325 (65 FE) TAB at 08:56

## 2024-08-17 RX ADMIN — OXYCODONE HYDROCHLORIDE 5 MG: 5 TABLET ORAL at 20:59

## 2024-08-17 RX ADMIN — LACOSAMIDE 50 MG: 50 TABLET, FILM COATED ORAL at 20:59

## 2024-08-17 RX ADMIN — Medication 10 ML: at 08:56

## 2024-08-17 RX ADMIN — METOPROLOL TARTRATE 25 MG: 25 TABLET, FILM COATED ORAL at 20:59

## 2024-08-17 RX ADMIN — PANTOPRAZOLE SODIUM 40 MG: 40 TABLET, DELAYED RELEASE ORAL at 08:57

## 2024-08-17 RX ADMIN — TRAZODONE HYDROCHLORIDE 25 MG: 50 TABLET ORAL at 20:59

## 2024-08-17 RX ADMIN — INSULIN LISPRO 6 UNITS: 100 INJECTION, SOLUTION INTRAVENOUS; SUBCUTANEOUS at 21:12

## 2024-08-17 RX ADMIN — ONDANSETRON HYDROCHLORIDE 4 MG: 2 SOLUTION INTRAMUSCULAR; INTRAVENOUS at 08:49

## 2024-08-17 RX ADMIN — LEVOTHYROXINE SODIUM 100 MCG: 0.1 TABLET ORAL at 08:57

## 2024-08-17 RX ADMIN — LACOSAMIDE 50 MG: 50 TABLET, FILM COATED ORAL at 08:56

## 2024-08-17 RX ADMIN — INSULIN LISPRO 4 UNITS: 100 INJECTION, SOLUTION INTRAVENOUS; SUBCUTANEOUS at 11:33

## 2024-08-17 RX ADMIN — OXYCODONE HYDROCHLORIDE 5 MG: 5 TABLET ORAL at 09:35

## 2024-08-17 RX ADMIN — Medication 10 ML: at 21:00

## 2024-08-17 RX ADMIN — POTASSIUM CHLORIDE 40 MEQ: 1.5 POWDER, FOR SOLUTION ORAL at 04:40

## 2024-08-17 RX ADMIN — POTASSIUM CHLORIDE 10 MEQ: 7.46 INJECTION, SOLUTION INTRAVENOUS at 06:42

## 2024-08-17 RX ADMIN — MUPIROCIN 1 APPLICATION: 20 OINTMENT TOPICAL at 08:56

## 2024-08-17 RX ADMIN — FOLIC ACID 1 MG: 1 TABLET ORAL at 08:56

## 2024-08-17 RX ADMIN — POTASSIUM CHLORIDE 10 MEQ: 7.46 INJECTION, SOLUTION INTRAVENOUS at 05:43

## 2024-08-17 RX ADMIN — METOPROLOL TARTRATE 25 MG: 25 TABLET, FILM COATED ORAL at 08:56

## 2024-08-17 RX ADMIN — ONDANSETRON HYDROCHLORIDE 4 MG: 2 SOLUTION INTRAMUSCULAR; INTRAVENOUS at 20:59

## 2024-08-17 RX ADMIN — POTASSIUM CHLORIDE 10 MEQ: 7.46 INJECTION, SOLUTION INTRAVENOUS at 04:26

## 2024-08-17 RX ADMIN — ATORVASTATIN CALCIUM 80 MG: 40 TABLET, FILM COATED ORAL at 20:59

## 2024-08-17 RX ADMIN — INSULIN GLARGINE 6 UNITS: 100 INJECTION, SOLUTION SUBCUTANEOUS at 08:51

## 2024-08-17 NOTE — PROGRESS NOTES
Pulmonary and critical care progress note    Chief complaint -generalized fatigue      Subjective-  Overnight events reviewed.  All the labs medications ins and outs and vitals reviewed.  None of the family members at bedside.  DCED zosyn   Adjusted insulin     Review of Systems  Not reliable as patient is a poor historian.  No changes.        History  Past Medical History:   Diagnosis Date    Arthritis     Closed fracture of right fibula and tibia 2018    Depression     Diabetic ulcer of left foot associated with type 2 diabetes mellitus 2017    Diastolic dysfunction     Disease of thyroid gland     Elevated cholesterol     End stage renal disease     Essential hypertension     GERD (gastroesophageal reflux disease)     History of transfusion     Hyperlipidemia     Iron deficiency anemia 2018    PAD (peripheral artery disease)     Renal failure     Type 2 diabetes mellitus with hyperglycemia, with long-term current use of insulin 2018   ,   Past Surgical History:   Procedure Laterality Date    ABDOMINAL SURGERY      ABOVE KNEE AMPUTATION Right 2024    Procedure: AMPUTATION ABOVE KNEE;  Surgeon: Angelo Santoro MD;  Location: [facility] OR;  Service: General;  Laterality: Right;    BACK SURGERY      Post spinal diskectomy, osteophytectomy in one lumbar interspace    CATARACT EXTRACTION      Left      SECTION      DENTAL PROCEDURE      ENDOSCOPY      FRACTURE SURGERY      right leg    HYSTERECTOMY      INCISION AND DRAINAGE LEG Left 4/15/2017    Procedure: INCISION AND DRAINAGE LOWER EXTREMITY;  Surgeon: Basilio Morris MD;  Location: [facility] OR;  Service:     INCISION AND DRAINAGE LEG Left 2017    Procedure: Irrigation and Debridment abcess diabetic wound left foot with deep culture;  Surgeon: Basilio Morris MD;  Location: [facility] OR;  Service:     INSERTION PERITONEAL DIALYSIS CATHETER N/A 2021    Procedure: INSERTION PERITONEAL DIALYSIS CATHETER LAPAROSCOPIC;  Surgeon:  Edy Glover MD;  Location:  [facility] OR;  Service: General;  Laterality: N/A;    KNEE ARTHROSCOPY Right     ORIF TIBIA FRACTURE Right 12/28/2018    Procedure: TIBIAL  FRACTURE OPEN REDUCTION INTERNAL FIXATION;  Surgeon: Jung Barragan MD;  Location: [facility] OR;  Service: Orthopedics    TOE AMPUTATION Right     5th    TUBAL ABDOMINAL LIGATION     ,   Family History   Problem Relation Age of Onset    Heart disease Mother     Cancer Mother     COPD Mother     Diabetes Other     Depression Other    ,   Social History     Tobacco Use    Smoking status: Never     Passive exposure: Never    Smokeless tobacco: Never   Vaping Use    Vaping status: Never Used   Substance Use Topics    Alcohol use: No    Drug use: No   ,   Medications Prior to Admission   Medication Sig Dispense Refill Last Dose    aspirin 81 MG EC tablet Take 1 tablet by mouth Daily.   8/2/2024    cefdinir (OMNICEF) 300 MG capsule Take 1 capsule by mouth Every Other Day.   8/2/2024    ferrous sulfate 325 (65 FE) MG tablet Take 1 tablet by mouth Daily With Breakfast.   8/2/2024    FLUoxetine (PROzac) 40 MG capsule Take 1 capsule by mouth Daily.   8/2/2024    fluticasone (FLONASE) 50 MCG/ACT nasal spray 2 sprays into the nostril(s) as directed by provider Daily As Needed for Rhinitis.   Past Week    folic acid (FOLVITE) 1 MG tablet Take 1 tablet by mouth Daily.   8/2/2024    HYDROcodone-acetaminophen (NORCO)  MG per tablet Take 1 tablet by mouth 3 (Three) Times a Day As Needed for Moderate Pain.   8/2/2024    hydrOXYzine (ATARAX) 25 MG tablet Take 1 tablet by mouth 3 (Three) Times a Day As Needed for Anxiety. 15 tablet 0 8/2/2024    Insulin Aspart, w/Niacinamide, (Fiasp) 100 UNIT/ML solution Inject 2-7 Units as directed 4 (Four) Times a Day Before Meals & at Bedtime.   8/2/2024    lacosamide (VIMPAT) 50 MG tablet tablet Take 1 tablet by mouth Every 12 (Twelve) Hours.   8/2/2024    levothyroxine (SYNTHROID, LEVOTHROID) 100 MCG tablet Take 1 tablet by  mouth Daily.   8/2/2024    lidocaine (LIDODERM) 5 % Place 1 patch on the skin as directed by provider Daily As Needed for Mild Pain. Remove & Discard patch within 12 hours or as directed by MD   Past Week    losartan (COZAAR) 50 MG tablet Take 1 tablet by mouth Daily.   8/2/2024    NIFEdipine XL (PROCARDIA XL) 90 MG 24 hr tablet Take 1 tablet by mouth Daily.   8/2/2024    ondansetron ODT (ZOFRAN-ODT) 4 MG disintegrating tablet Place 1 tablet on the tongue 3 (Three) Times a Day As Needed for Nausea or Vomiting.   Past Week    pantoprazole (PROTONIX) 40 MG EC tablet Take 1 tablet by mouth Daily.   8/2/2024    potassium chloride (KLOR-CON M20) 20 MEQ CR tablet Take 1 tablet by mouth 2 (Two) Times a Day.   8/2/2024    rOPINIRole (REQUIP) 0.5 MG tablet Take 1 tablet by mouth 2 (Two) Times a Day.   8/2/2024    tiZANidine (ZANAFLEX) 4 MG tablet Take 1 tablet by mouth Every 6 (Six) Hours As Needed for Muscle Spasms.   8/2/2024    atorvastatin (LIPITOR) 80 MG tablet Take 1 tablet by mouth Every Night. 30 tablet 0 8/1/2024    traZODone (DESYREL) 50 MG tablet Take 1 tablet by mouth Every Night.   8/1/2024   , Scheduled Meds:  atorvastatin, 80 mg, Oral, Nightly  cholecalciferol, 50,000 Units, Oral, Q7 Days  ferrous sulfate, 325 mg, Oral, Daily With Breakfast  FLUoxetine, 10 mg, Oral, Daily  folic acid, 1 mg, Oral, Daily  insulin glargine, 6 Units, Subcutaneous, Daily  insulin lispro, 2-9 Units, Subcutaneous, 4x Daily AC & at Bedtime  lacosamide, 50 mg, Oral, Q12H  levothyroxine, 100 mcg, Oral, QAM AC  metoprolol tartrate, 25 mg, Oral, Q12H  pantoprazole, 40 mg, Oral, QAM AC  sodium chloride, 1,000 mL, Intravenous, Once  sodium chloride, 10 mL, Intravenous, Q12H  sodium chloride, 10 mL, Intravenous, Q12H  sodium chloride, 10 mL, Intravenous, Q12H  sodium chloride, 10 mL, Intravenous, Q12H  sodium chloride, 10 mL, Intravenous, Q12H  traZODone, 25 mg, Oral, Nightly    , Continuous Infusions:      and Allergies:  Morphine,  Penicillins, Codeine, and Sulfa antibiotics    Objective     Vital Signs   Temp:  [96.7 °F (35.9 °C)-97.8 °F (36.6 °C)] 97.8 °F (36.6 °C)  Heart Rate:  [56-74] 66  Resp:  [13-18] 16  BP: (116-156)/() 144/98    Physical Exam:             General-chronically ill in appearance  HEENT-atraumatic    Neck-supple    Respiratory-respirations normal.  Not in any respiratory distress.    Cardiovascular-NSR  GI-grossly normal    CNS-nonfocal    Musculoskeletal -no edema, right above-knee amputation      Psychiatric-alert awake oriented                                                                    Results Review:    LABS:    Lab Results   Component Value Date    GLUCOSE 163 (H) 08/17/2024    BUN 28 (H) 08/17/2024    CREATININE 2.55 (H) 08/17/2024    EGFRIFNONA 8 (L) 02/06/2022    EGFRIFAFRI  02/06/2022      Comment:      <15 Indicative of kidney failure.    BCR 11.0 08/17/2024    CO2 21.4 (L) 08/17/2024    CALCIUM 7.0 (L) 08/17/2024    PROTENTOTREF 6.1 12/08/2020    ALBUMIN 2.5 (L) 08/16/2024    LABIL2 1.2 12/08/2020     (H) 08/16/2024    ALT 30 08/16/2024    WBC 3.80 08/17/2024    HGB 10.5 (L) 08/17/2024    HCT 29.7 (L) 08/17/2024    MCV 83.0 08/17/2024    PLT 41 (C) 08/17/2024     (L) 08/17/2024    K 3.4 (L) 08/17/2024     08/17/2024    ANIONGAP 11.6 08/17/2024       Lab Results   Component Value Date    INR 0.88 (L) 08/15/2024    INR 1.13 (H) 08/14/2024    INR 1.20 (H) 08/14/2024    PROTIME 12.0 (L) 08/15/2024    PROTIME 14.6 08/14/2024    PROTIME 15.3 (H) 08/14/2024       Results from last 7 days   Lab Units 08/15/24  0901 08/14/24  2314 08/14/24  1521 08/14/24  0702 08/13/24  2154   INR  0.88* 1.13* 1.20* 1.46* 2.13*   APTT seconds  --  43.3*  --  96.4* 146.4*          I reviewed the patient's new clinical results.  I reviewed the patient's new imaging results and agree with the interpretation.  Microbiology Results (last 10 days)       Procedure Component Value - Date/Time    Fungus Culture -  Body Fluid, Peritoneal Catheter [[id number]] Collected: 08/13/24 1253    Lab Status: Preliminary result Specimen: Body Fluid from Peritoneal Catheter Updated: 08/14/24 1908     Fungus Stain Final report     KOH/Calcofluor preparation Comment     Comment: KOH/Calcofluor preparation:  no fungus observed.       Narrative:      Performed at:   - [facility]  [id number]  : Mahendra Finney PhD, Phone:  [phone]    Body Fluid Culture - Body Fluid, Peritoneum [[id number]] Collected: 08/13/24 1253    Lab Status: Preliminary result Specimen: Body Fluid from Peritoneum Updated: 08/16/24 1300     Body Fluid Culture No growth at 3 days     Gram Stain WBCs seen      No organisms seen    Blood Culture - Blood, Arm, Left [[id number]]  (Normal) Collected: 08/12/24 1917    Lab Status: Preliminary result Specimen: Blood from Arm, Left Updated: 08/16/24 1931     Blood Culture No growth at 4 days    MRSA Screen, PCR (Inpatient) - Swab, Nares [[id number]]  (Normal) Collected: 08/11/24 1557    Lab Status: Final result Specimen: Swab from Nares Updated: 08/11/24 1716     MRSA PCR No MRSA Detected    Narrative:      The negative predictive value of this diagnostic test is high and should only be used to consider de-escalating anti-MRSA therapy. A positive result may indicate colonization with MRSA and must be correlated clinically.             Assessment & Plan     Neurology-alert awake able to protect her airway.  Continue to monitor neurological status    Respiratory-latest chest x-ray reviewed.  FiO2 requirement reviewed and adjusted to maintain saturation 88 to 92%.    Latest VBG reviewed.    Cardiology-   Septic shock-likely due to the foot ulcer and then amputation.  Remains off vasopressors.    Tolerated discontinuing the steroids.    Tolerated discontinue midodrine.      Hypertension-continue antihypertensives  Patient's blood pressure is remaining good.  Discontinue hydrocortisone if  sugars were on the higher side.  Continue to monitor HR- rate and rhythm, BP         Lab 08/13/24  2154 08/13/24  0616 08/13/24  0005 08/12/24  1812 08/12/24  1312   LACTATE 1.5 2.5* 3.5* 2.3* 2.3*      Nephrology- Cr and BUN stable  I/O-reviewed.  Creatinine elevated.  Nephrology on case.  Case was briefly discussed with them.  Continue management as per them.  Continue fluids as per nephrology.    Ionized calcium remains on the lower side.  Recommend replating.  Will get levels tomorrow morning  Vitamin D levels on the lower side.  Started replacing.  Labs in a.m.-ordered      GI- PPI prophylaxis  Continue current diet.  .  Transaminitis-likely due to shock liver.  AST and ALT are better.  Levels better  Bleeding through central line-resolved      Narrative & Impression   PROCEDURE: Abdominal ultrasound evaluation performed on August 11, 2024.  Color flow imaging performed.     HISTORY: Elevated liver function tests.     COMPARISON: None.     FINDINGS:     Echogenic liver suggestive of fatty infiltration.  Small volume of free fluid adjacent to the liver consistent with  ascites.  Patent hepatic veins with appropriate direction of flow.  Patent main portal vein with appropriate direction of flow.  No hepatic mass  No dilated intrahepatic bile ducts  Sludge noted in the gallbladder lumen.  No gallbladder wall thickening or para cholecystic fluid.  No right renal hydronephrosis  No features of cholecystitis.     IMPRESSION:     1.  Echogenic liver suggestive of fatty infiltration.  2.  Patent main portal vein with appropriate direction of flow.  3.  Patent hepatic veins with appropriate direction of flow.  4.  Sludge noted in the gallbladder lumen.  5.  No features of cholecystitis.  6.  Small volume of free fluid in the right upper quadrant consistent  with ascites.  7.  No hepatic mass or cyst  8.  No dilated intrahepatic bile ducts.          Hematology- CBC  Hb-transfuse for hemoglobin less than  7      Thrombocytopenia-if continues to drop recommend hematology consult.  Discontinue Zosyn.  CBC in the morning.    ID sepsis and septic shock-likely due to the ulcer.  Status post amputation.    Culture-reviewed  And Antibiotics-continue.  Cultures reviewed  Cultures remain negative.  Endrocrinology- Maintain Blood sugar 140 -180   Latest Reference Range & Units 08/13/24 00:04 08/13/24 23:32   TSH Baseline 0.270 - 4.200 uIU/mL  3.580   Free T4 0.92 - 1.68 ng/dL 1.88 (H)    (H): Data is abnormally high    Repeat free T4 within normal limits.    Electrolytes-   Mag and phos       DVT prophylaxis-    Bedside rounds were done with RT and patient's nurse. All the lab and clinical findings were discussed with them and plan was also discussed in great detail.    Family member present-none    Overall prognosis poor.  Consulted palliative care team.    Echo-    Results for orders placed during the hospital encounter of 08/19/23    Adult Transthoracic Echo Complete W/ Cont if Necessary Per Protocol    Interpretation Summary    Left ventricular systolic function is normal. Left ventricular ejection fraction appears to be 56 - 60%.    Left ventricular diastolic function is consistent with (grade I) impaired relaxation.    Left atrial volume is mildly increased.    Moderate mitral valve stenosis is present.    Estimated right ventricular systolic pressure from tricuspid regurgitation is mildly elevated (35-45 mmHg).    Mild pulmonary hypertension is present.    There is no evidence of pericardial effusion.  Patient is currently critically ill due to HTN, bleeding, shock liver and ongoing sepsis.  I adjusted patient's medications.   Case discussed with patient's nurse and primary team.    This service is provided via audio video tele medicine   I am in [facility] and patient is in [facility]          Diabetic ulcer of right heel          Johnson Rider MD  08/17/24  09:00 EDT

## 2024-08-17 NOTE — PROGRESS NOTES
[facility] HOSPITALIST PROGRESS NOTE     Patient Identification:  Name:  Reny Elena  Age:  64 y.o.  Sex:  female  :  1960  MRN:  [id number]  Visit Number:  [id number]  Primary Care Provider:  Susan Stephens APRN    Length of stay:  14    Subjective: Patient seen and examined, patient continues to complain of pain all over but it seems to be more localized to her abdomen.  It was tender to palpation.  She was eating very well for her  when she normally will not.  He asked about the swelling in her arms and spent quite a bit of time explaining third spacing.  The nursing staff also told me that she has had multiple bowel movements throughout today.    Chief Complaint: Septic shock  ----------------------------------------------------------------------------------------------------------------------  Current Hospital Meds:  atorvastatin, 80 mg, Oral, Nightly  cholecalciferol, 50,000 Units, Oral, Q7 Days  ferrous sulfate, 325 mg, Oral, Daily With Breakfast  FLUoxetine, 10 mg, Oral, Daily  folic acid, 1 mg, Oral, Daily  [START ON 2024] insulin glargine, 10 Units, Subcutaneous, Daily  insulin lispro, 2-9 Units, Subcutaneous, 4x Daily AC & at Bedtime  lacosamide, 50 mg, Oral, Q12H  levothyroxine, 100 mcg, Oral, QAM AC  metoprolol tartrate, 25 mg, Oral, Q12H  pantoprazole, 40 mg, Oral, QAM AC  sodium chloride, 1,000 mL, Intravenous, Once  sodium chloride, 10 mL, Intravenous, Q12H  sodium chloride, 10 mL, Intravenous, Q12H  sodium chloride, 10 mL, Intravenous, Q12H  sodium chloride, 10 mL, Intravenous, Q12H  sodium chloride, 10 mL, Intravenous, Q12H  traZODone, 25 mg, Oral, Nightly         ----------------------------------------------------------------------------------------------------------------------  Vital Signs:  Temp:  [96.8 °F (36 °C)-98 °F (36.7 °C)] 98 °F (36.7 °C)  Heart Rate:  [56-71] 62  Resp:  [15-22] 16  BP: (108-156)/() 135/79       Tele: Sinus rhythm 73  bpm      08/14/24  0630 08/15/24  0500 08/17/24  0600   Weight: 46.5 kg (102 lb 8.2 oz) 47.1 kg (103 lb 13.4 oz) 48.1 kg (106 lb 0.7 oz)     Body mass index is 18.19 kg/m².    Intake/Output Summary (Last 24 hours) at 8/17/2024 1345  Last data filed at 8/17/2024 1200  Gross per 24 hour   Intake 999 ml   Output 1607 ml   Net -608 ml     Diet: Regular/House; Feeding Assistance - Nursing; Texture: Pureed (NDD 1); Fluid Consistency: Thin (IDDSI 0)  ----------------------------------------------------------------------------------------------------------------------  Physical exam:  General: Chronically ill-appearing, awake and conversant, she appears uncomfortable.  She is not confused.  No respiratory distress.    Skin:  Skin is warm and dry. No rash noted. No pallor.    HENT:  Head:  Normocephalic and atraumatic.  Mouth:  Moist mucous membranes.  Edentulous  Eyes:  Conjunctivae and EOM are normal.  Pupils are equal, round, and reactive to light.  No scleral icterus.    Neck:  Neck supple.  No JVD present.    Pulmonary/Chest:  No respiratory distress, no wheezes, no crackles, with normal breath sounds and good air movement.  Cardiovascular:  Normal rate, regular rhythm and normal heart sounds with no murmur.  Abdominal: PD in place, anasarca, soft.  Bowel sounds are normal.  No distension but with tenderness with minimal palpation  Extremities: Pitting edema with anasarca upper and lower extremity.  No cyanosis or clubbing.   Neurological: She has no motor deficit, no slurring of speech,   Genitourinary: No Doshi catheter  Back:  ----------------------------------------------------------------------------------------------------------------------  ----------------------------------------------------------------------------------------------------------------------  Results from last 7 days   Lab Units 08/11/24  0744   CK TOTAL U/L 90     Results from last 7 days   Lab Units 08/17/24  0029 08/16/24  0028 08/15/24  1116  08/15/24  0901 08/14/24  2314 08/14/24  1521 08/14/24  0702 08/13/24  2329 08/13/24  2154 08/13/24  0616 08/13/24  0005 08/13/24  0004 08/12/24  1918   CRP mg/dL  --   --   --   --   --   --   --   --   --   --   --   --  1.65*   LACTATE mmol/L  --   --   --   --   --   --   --   --  1.5 2.5* 3.5*  --   --    WBC 10*3/mm3 3.80 3.56 4.07 3.97 4.31  --  5.70   < >  --   --   --    < >  --    HEMOGLOBIN g/dL 10.5* 11.0* 11.2* 11.4* 6.3*  --  9.2*   < >  --   --   --    < >  --    HEMATOCRIT % 29.7* 31.5* 33.7* 33.6* 18.9*  --  26.8*   < >  --   --   --    < >  --    MCV fL 83.0 84.5 88.9 85.1 93.1  --  92.7   < >  --   --   --    < >  --    MCHC g/dL 35.4 34.9 33.2 33.9 33.3  --  34.3   < >  --   --   --    < >  --    PLATELETS 10*3/mm3 41* 74* 64* 61* 79*  --  104*   < >  --   --   --    < >  --    INR   --   --   --  0.88* 1.13* 1.20* 1.46*  --  2.13*  --   --   --   --     < > = values in this interval not displayed.     Results from last 7 days   Lab Units 08/14/24  0503   PH, ARTERIAL pH units 7.411   PO2 ART mm Hg 72.0*   PCO2, ARTERIAL mm Hg 34.6*   HCO3 ART mmol/L 21.9     Results from last 7 days   Lab Units 08/17/24  0708 08/17/24  0029 08/16/24  0646 08/16/24  0028 08/15/24  0901 08/14/24  0702 08/13/24  0004 08/12/24  0312   SODIUM mmol/L 134* 138 136 137 137 134*   < > 136   POTASSIUM mmol/L 3.4* 2.3* 3.0* 3.3* 3.1* 3.7   < > 3.3*   MAGNESIUM mg/dL  --   --   --  2.2 1.3*  --   --  1.3*   CHLORIDE mmol/L 101 102 98 99 98 100   < > 104   CO2 mmol/L 21.4* 21.5* 20.1* 22.0 20.9* 18.5*   < > 15.9*   BUN mg/dL 28* 27* 24* 25* 23 21   < > 18   CREATININE mg/dL 2.55* 2.41* 2.60* 2.61* 2.54* 3.03*   < > 2.85*   CALCIUM mg/dL 7.0* 7.0* 7.2* 7.2* 7.4* 6.8*   < > 5.7*   GLUCOSE mg/dL 163* 311* 233* 216* 163* 136*   < > 229*   ALBUMIN g/dL  --   --   --  2.5* 2.9* 2.2*   < > 1.4*   BILIRUBIN mg/dL  --   --   --  0.6 1.2 0.9   < > 0.3   ALK PHOS U/L  --   --   --  496* 422* 433*   < > 196*   AST (SGOT) U/L  --   --    "--  258* 400* 1,095*   < > 2,747*   ALT (SGPT) U/L  --   --   --  30 32 73*   < > 322*    < > = values in this interval not displayed.   Estimated Creatinine Clearance: 16.5 mL/min (A) (by C-G formula based on SCr of 2.55 mg/dL (H)).    No results found for: \"AMMONIA\"      Blood Culture   Date Value Ref Range Status   08/12/2024 No growth at 3 days  Preliminary     No results found for: \"URINECX\"  No results found for: \"WOUNDCX\"  No results found for: \"STOOLCX\"    I have personally looked at the labs and they are summarized above.  ----------------------------------------------------------------------------------------------------------------------  Imaging Results (Last 24 Hours)       ** No results found for the last 24 hours. **          ----------------------------------------------------------------------------------------------------------------------  Assessment and Plan:  -Septic shock requiring pressor support improved  -Acute ischemic hepatitis resolved  -Abdominal pain possibly secondary to above  -End-stage renal disease on PD  -Severe malnutrition patient's intake is improved  -Right ankle osteomyelitis status post above-knee amputation  -Severe debility  -Edentulous  -Severe hypoalbuminemia  -Acute metabolic encephalopathy from sepsis improved  -Acute hypokalemia  -Diabetes type 2 insulin requiring with hyperglycemia  -Diabetes related hypoglycemia  -Coagulopathy multifactorial, improved  -Acute blood loss anemia from surgery, left central line bleeding requiring 4 units packed RBC  -Anorexia improved  -Acute thrombocytopenia improving etiology?  Sepsis + - dilution, HIT workup pending  -Anasarca  -Hypertension suboptimal control      With increasing amount of diarrhea and abdominal pain have ordered a C. difficile panel as well as a CT scan of the abdomen and pelvis without contrast  Patient's significant other is stating that she is in pain.  She was given oral pain medicine    Patient may benefit " [facility]    Total time spent on this encounter is 32 minutes.    Disposition pending, may benefit continued care      Yandel Ortega DO  08/17/24  13:45 EDT

## 2024-08-17 NOTE — PROGRESS NOTES
PROGRESS NOTE         Patient Identification:  Name:  Reny Elena  Age:  64 y.o.  Sex:  female  :  1960  MRN:  [id number]  Visit Number:  [id number]  Primary Care Provider:  Susan Stephens APRN         LOS: 14 days       ----------------------------------------------------------------------------------------------------------------------  Subjective       Chief Complaints:    Vomiting        Interval History:      Patient drowsy and sedate this morning.  Continues on room air with no apparent distress.  Lung sounds diminished bilaterally.  Abdomen soft, nontender.  Case discussed with primary RN no changes overnight.  WBC normal at 3.80.    Review of Systems:    EMMANUELLE due to confusion  ----------------------------------------------------------------------------------------------------------------------      Objective       Current Hospital Meds:  atorvastatin, 80 mg, Oral, Nightly  cholecalciferol, 50,000 Units, Oral, Q7 Days  ferrous sulfate, 325 mg, Oral, Daily With Breakfast  FLUoxetine, 10 mg, Oral, Daily  folic acid, 1 mg, Oral, Daily  [START ON 2024] insulin glargine, 10 Units, Subcutaneous, Daily  insulin lispro, 2-9 Units, Subcutaneous, 4x Daily AC & at Bedtime  lacosamide, 50 mg, Oral, Q12H  levothyroxine, 100 mcg, Oral, QAM AC  metoprolol tartrate, 25 mg, Oral, Q12H  pantoprazole, 40 mg, Oral, QAM AC  sodium chloride, 1,000 mL, Intravenous, Once  sodium chloride, 10 mL, Intravenous, Q12H  sodium chloride, 10 mL, Intravenous, Q12H  sodium chloride, 10 mL, Intravenous, Q12H  sodium chloride, 10 mL, Intravenous, Q12H  sodium chloride, 10 mL, Intravenous, Q12H  traZODone, 25 mg, Oral, Nightly             ----------------------------------------------------------------------------------------------------------------------    Vital Signs:  Temp:  [96.7 °F (35.9 °C)-98 °F (36.7 °C)] 98 °F (36.7 °C)  Heart Rate:  [56-71] 64  Resp:  [14-22] 16  BP: (116-156)/() 125/80  Mean  Arterial Pressure (Non-Invasive) for the past 24 hrs (Last 3 readings):   Noninvasive MAP (mmHg)   08/17/24 1130 95   08/17/24 1100 95   08/17/24 1030 106       SpO2 Percentage    08/17/24 1030 08/17/24 1100 08/17/24 1130   SpO2: 100% 100% 99%     SpO2:  [96 %-100 %] 99 %  on   ;   Device (Oxygen Therapy): room air    Body mass index is 18.19 kg/m².  Wt Readings from Last 3 Encounters:   08/17/24 48.1 kg (106 lb 0.7 oz)   07/26/24 45.4 kg (100 lb)   07/26/24 51.7 kg (114 lb)        Intake/Output Summary (Last 24 hours) at 8/17/2024 1143  Last data filed at 8/17/2024 0822  Gross per 24 hour   Intake 777 ml   Output 1607 ml   Net -830 ml     Diet: Regular/House; Feeding Assistance - Nursing; Texture: Pureed (NDD 1); Fluid Consistency: Thin (IDDSI 0)  ----------------------------------------------------------------------------------------------------------------------      Physical Exam:    Constitutional: Frail elderly female resting quietly in bed.  Drowsy and sedate.  HENT:  Head: Normocephalic and atraumatic.  Mouth:  Moist mucous membranes.    Eyes:  Conjunctivae and EOM are normal.  No scleral icterus.  Neck:  Neck supple.  No JVD present.    Cardiovascular:  Normal rate, regular rhythm and normal heart sounds with no murmur.  Bilateral upper extreme edema.  Pulmonary/Chest: Diminished to auscultation bilaterally no respiratory distress, no wheezes, no crackles, with normal breath sounds and good air movement.  Abdominal:  peritoneal dialysis catheter. Soft.  Bowel sounds are normal.  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  Neurological:  Alert and oriented to person, place, and time.  No facial droop.  No slurred speech.    Skin: Bleeding noted to left IJ central line insertion site.  Right AKA stump is dressed, clean dry and  intact      ----------------------------------------------------------------------------------------------------------------------  Results from last 7 days   Lab Units 08/11/24  0744   CK TOTAL U/L 90         Results from last 7 days   Lab Units 08/14/24  0503   PH, ARTERIAL pH units 7.411   PO2 ART mm Hg 72.0*   PCO2, ARTERIAL mm Hg 34.6*   HCO3 ART mmol/L 21.9     Results from last 7 days   Lab Units 08/17/24  0029 08/16/24  0028 08/15/24  1116 08/15/24  0901 08/14/24  2314 08/14/24  1521 08/14/24  0702 08/13/24  2329 08/13/24  2154 08/13/24  0616 08/13/24  0005 08/13/24  0004 08/12/24  1918   CRP mg/dL  --   --   --   --   --   --   --   --   --   --   --   --  1.65*   LACTATE mmol/L  --   --   --   --   --   --   --   --  1.5 2.5* 3.5*  --   --    WBC 10*3/mm3 3.80 3.56 4.07 3.97 4.31  --  5.70   < >  --   --   --    < >  --    HEMOGLOBIN g/dL 10.5* 11.0* 11.2* 11.4* 6.3*  --  9.2*   < >  --   --   --    < >  --    HEMATOCRIT % 29.7* 31.5* 33.7* 33.6* 18.9*  --  26.8*   < >  --   --   --    < >  --    MCV fL 83.0 84.5 88.9 85.1 93.1  --  92.7   < >  --   --   --    < >  --    MCHC g/dL 35.4 34.9 33.2 33.9 33.3  --  34.3   < >  --   --   --    < >  --    PLATELETS 10*3/mm3 41* 74* 64* 61* 79*  --  104*   < >  --   --   --    < >  --    INR   --   --   --  0.88* 1.13* 1.20* 1.46*  --  2.13*  --   --   --   --     < > = values in this interval not displayed.     Results from last 7 days   Lab Units 08/17/24  0708 08/17/24  0029 08/16/24  0646 08/16/24  0028 08/15/24  0901 08/14/24  0702 08/13/24  0004 08/12/24  0312   SODIUM mmol/L 134* 138 136 137 137 134*   < > 136   POTASSIUM mmol/L 3.4* 2.3* 3.0* 3.3* 3.1* 3.7   < > 3.3*   MAGNESIUM mg/dL  --   --   --  2.2 1.3*  --   --  1.3*   CHLORIDE mmol/L 101 102 98 99 98 100   < > 104   CO2 mmol/L 21.4* 21.5* 20.1* 22.0 20.9* 18.5*   < > 15.9*   BUN mg/dL 28* 27* 24* 25* 23 21   < > 18   CREATININE mg/dL 2.55* 2.41* 2.60* 2.61* 2.54* 3.03*   < > 2.85*   CALCIUM mg/dL  "7.0* 7.0* 7.2* 7.2* 7.4* 6.8*   < > 5.7*   GLUCOSE mg/dL 163* 311* 233* 216* 163* 136*   < > 229*   ALBUMIN g/dL  --   --   --  2.5* 2.9* 2.2*   < > 1.4*   BILIRUBIN mg/dL  --   --   --  0.6 1.2 0.9   < > 0.3   ALK PHOS U/L  --   --   --  496* 422* 433*   < > 196*   AST (SGOT) U/L  --   --   --  258* 400* 1,095*   < > 2,747*   ALT (SGPT) U/L  --   --   --  30 32 73*   < > 322*    < > = values in this interval not displayed.   Estimated Creatinine Clearance: 16.5 mL/min (A) (by C-G formula based on SCr of 2.55 mg/dL (H)).  No results found for: \"AMMONIA\"      Glucose   Date/Time Value Ref Range Status   08/17/2024 1119 210 (H) 70 - 130 mg/dL Final   08/17/2024 0853 194 (H) 70 - 130 mg/dL Final   08/16/2024 2148 249 (H) 70 - 130 mg/dL Final   08/16/2024 1731 156 (H) 70 - 130 mg/dL Final   08/16/2024 1104 253 (H) 70 - 130 mg/dL Final   08/16/2024 0617 217 (H) 70 - 130 mg/dL Final   08/15/2024 2022 278 (H) 70 - 130 mg/dL Final   08/15/2024 1716 214 (H) 70 - 130 mg/dL Final     Lab Results   Component Value Date    HGBA1C 6.10 (H) 08/02/2024     Lab Results   Component Value Date    TSH 3.580 08/13/2024    FREET4 1.08 08/14/2024       Blood Culture   Date Value Ref Range Status   08/02/2024 No growth at 24 hours  Preliminary   08/02/2024 No growth at 24 hours  Preliminary     No results found for: \"URINECX\"  No results found for: \"WOUNDCX\"  No results found for: \"STOOLCX\"  No results found for: \"RESPCX\"  Pain Management Panel  More data exists         Latest Ref Rng & Units 7/21/2024 4/28/2024   Pain Management Panel   Amphetamine, Urine Qual Negative Negative  Negative    Barbiturates Screen, Urine Negative Negative  Negative    Benzodiazepine Screen, Urine Negative Negative  Negative    Buprenorphine, Screen, Urine Negative Negative  Negative    Cocaine Screen, Urine Negative Negative  Negative    Fentanyl, Urine Negative Negative  Negative    Methadone Screen , Urine Negative Negative  Negative    Methamphetamine, Ur " Negative Negative  Negative       Details                     ----------------------------------------------------------------------------------------------------------------------  Imaging Results (Last 24 Hours)       ** No results found for the last 24 hours. **            ----------------------------------------------------------------------------------------------------------------------    Pertinent Infectious Disease Results    Thank you Dr. Crawford for allowing us to participate in the care of your patient.  As you well know, Ms. Reny Elena is a 64 y.o. female with past medical history significant for seizure disorder, type 2 diabetes, arthritis, CHF, hypothyroidism, hypertension, hyperlipidemia, GERD, PAD, ESRD, iron deficiency anemia,, who presented to [facility] on 8/2/2024 for nausea, vomiting, diarrhea and worsening pain of right diabetic foot wound.  Patient was recently seen in our ED on 7/21/2024 with complaints of hallucinations, fever, and right foot pain.  At this time, patient was diagnosed with right heel ulcer and UTI.  She was sent home with an antibiotic but was unsure of the name.  Patient reported she started having nausea and vomiting after taking the antibiotics so she stopped taking them.  However, she continued to have nausea and vomiting.       Patient was tachycardic in the ED.Labs show K+ at upper limit of normal, BUN 30, cr 4.36, glucose 176, alk phos 323 and albumin 2.9, otherwise normal LFTs. CRP mildly elevated at 2.34, while lactate and WBC are normal. UA shows large leuk esterase and unable to determine RBC, WBC, bacteria of squamous cells due to loaded field.  Patient's urine culture from 7/21/2024 grew E. coli.  She had also been following with wound care who it obtained a wound culture on the same day.  This culture finalized with mixed gram-negative maurisio and light growth of Enterococcus faecalis.       X-ray of the right foot performed  on 8/2/2024 revealed partial amputation of the fifth metatarsal. Bony demineralization. No acute fracture or dislocation. No lytic or blastic bony lesions seen. Diffuse interphalangeal joint space loss. Mild degenerative changes in the midfoot. No cortical erosion. Fixation timur in the tibia/talus/calcaneus noted. Diffuse soft tissue swelling. Vascular calcifications. No soft tissue gas.  CT abdomen/pelvis revealed peritoneal dialysis catheter noted in the anterior right abdominal wall with the catheter noted to terminate in the left lateral pelvis. Small volume of free fluid in the right upper quadrant and lower posterior pelvis and right lower quadrant. Slightly elevated left hemidiaphragm. No bowel or renal obstruction. No abdominal aortic aneurysm. No features of acute appendicitis. Stranding identified in the presacral space could present changes related to mild distal colitis. Fluid-filled bladder with small volume of air in the bladder lumen. No pneumatosis. No conclusive features of free air. Distended configuration to the gallbladder likely due to fasting. No conclusive features of gastritis or enteritis.       Patient continued to have nausea and vomiting after 2 doses of Zofran.  She was admitted for further treatment.  Subsequently, patient was started on IV Rocephin in the setting of E. coli UTI.  Flagyl was added to cover possible colitis.  Patient was also started on IV vancomycin for Enterococcus faecalis wound infection.  General surgery consulted for possible debridement of patient's wound.     Assessment/Plan       Assessment     R foot wound infection, Enterococcus faecalis  Possible osteomyelitis of R foot  Hardware present in RLE  E. Coli UTI  Possible colitis         Plan      Patient drowsy and sedate this morning.  Continues on room air with no apparent distress.  Lung sounds diminished bilaterally.  Abdomen soft, nontender.  Case discussed with primary RN no changes overnight.  WBC normal at  3.80.    Peritoneal body fluid culture shows no growth thus far.    Patient completed empiric courses of vancomycin and Zosyn.  Patient overall stable off of antibiotic therapy at this time.  Will continue to monitor off of antibiotic coverage for now.       ANTIMICROBIAL THERAPY    cefdinir - 300 MG     Code Status:   Code Status and Medical Interventions: No CPR (Do Not Attempt to Resuscitate); Limited Support; No intubation (DNI)   Ordered at: 08/04/24 1645     Medical Intervention Limits:    No intubation (DNI)     Code Status (Patient has no pulse and is not breathing):    No CPR (Do Not Attempt to Resuscitate)     Medical Interventions (Patient has pulse or is breathing):    Limited Support       DARIUS Moreno  08/17/24  11:43 EDT

## 2024-08-17 NOTE — PLAN OF CARE
Problem: Pain Acute  Goal: Acceptable Pain Control and Functional Ability  Outcome: Ongoing, Progressing     Problem: Impaired Wound Healing  Goal: Optimal Wound Healing  Outcome: Ongoing, Progressing     Problem: Adult Inpatient Plan of Care  Goal: Plan of Care Review  Outcome: Ongoing, Progressing  Goal: Patient-Specific Goal (Individualized)  Outcome: Ongoing, Progressing  Goal: Absence of Hospital-Acquired Illness or Injury  Outcome: Ongoing, Progressing  Intervention: Identify and Manage Fall Risk  Recent Flowsheet Documentation  Taken 8/17/2024 1533 by Ailce Bennett RN  Safety Promotion/Fall Prevention: patient off unit  Taken 8/17/2024 1500 by Alice Bennett RN  Safety Promotion/Fall Prevention: safety round/check completed  Intervention: Prevent Infection  Recent Flowsheet Documentation  Taken 8/17/2024 1500 by Alice Bennett RN  Infection Prevention:   hand hygiene promoted   personal protective equipment utilized  Goal: Optimal Comfort and Wellbeing  Outcome: Ongoing, Progressing  Goal: Readiness for Transition of Care  Outcome: Ongoing, Progressing     Problem: Fall Injury Risk  Goal: Absence of Fall and Fall-Related Injury  Outcome: Ongoing, Progressing  Intervention: Promote Injury-Free Environment  Recent Flowsheet Documentation  Taken 8/17/2024 1533 by Alice Bennett, RN  Safety Promotion/Fall Prevention: patient off unit  Taken 8/17/2024 1500 by Alice Bennett RN  Safety Promotion/Fall Prevention: safety round/check completed     Problem: Skin Injury Risk Increased  Goal: Skin Health and Integrity  Outcome: Ongoing, Progressing     Problem: Behavioral Health Comorbidity  Goal: Maintenance of Behavioral Health Symptom Control  Outcome: Ongoing, Progressing     Problem: Diabetes Comorbidity  Goal: Blood Glucose Level Within Targeted Range  Outcome: Ongoing, Progressing     Problem: Hypertension Comorbidity  Goal: Blood Pressure in Desired Range  Outcome: Ongoing, Progressing      Problem: Adjustment to Illness (Sepsis/Septic Shock)  Goal: Optimal Coping  Outcome: Ongoing, Progressing     Problem: Bleeding (Sepsis/Septic Shock)  Goal: Absence of Bleeding  Outcome: Ongoing, Progressing     Problem: Glycemic Control Impaired (Sepsis/Septic Shock)  Goal: Blood Glucose Level Within Desired Range  Outcome: Ongoing, Progressing     Problem: Infection Progression (Sepsis/Septic Shock)  Goal: Absence of Infection Signs and Symptoms  Outcome: Ongoing, Progressing  Intervention: Initiate Sepsis Management  Recent Flowsheet Documentation  Taken 8/17/2024 1500 by Alice Bennett RN  Infection Prevention:   hand hygiene promoted   personal protective equipment utilized  Isolation Precautions:   contact   spore     Problem: Nutrition Impaired (Sepsis/Septic Shock)  Goal: Optimal Nutrition Intake  Outcome: Ongoing, Progressing     Problem: Adjustment to Amputation (Lower Extremity Amputation)  Goal: Optimal Adjustment to Amputation  Outcome: Ongoing, Progressing     Problem: BADL (Basic Activities of Daily Living) Impairment (Lower Extremity Amputation)  Goal: Optimal Safe BADL Performance  Outcome: Ongoing, Progressing     Problem: IADL (Instrumental Activities of Daily Living) Impairment (Lower Extremity Amputation)  Goal: Optimal Safe IADL Performance  Outcome: Ongoing, Progressing     Problem: Lower Limb Care (Lower Extremity Amputation)  Goal: Effective Lower Limb Care  Outcome: Ongoing, Progressing     Problem: Mobility Impairment (Lower Extremity Amputation)  Goal: Optimal Mobility [place] and Safety  Outcome: Ongoing, Progressing     Problem: Pain and Hypersensitivity (Lower Extremity Amputation)  Goal: Acceptable Pain/Hypersensitivity Control  Outcome: Ongoing, Progressing   Goal Outcome Evaluation:

## 2024-08-17 NOTE — PROGRESS NOTES
"Nephrology Progress Note      Subjective   Unable to obtain subjective, patient was not communicative.  Otherwise lying on bed comfortably reported her oral intake remains poor but she had all of her [place]  Objective       Vital signs :     Temp:  [96.7 °F (35.9 °C)-98 °F (36.7 °C)] 98 °F (36.7 °C)  Heart Rate:  [56-72] 69  Resp:  [14-22] 22  BP: (116-156)/() 133/91    Intake/Output                               08/15/24 0701 - 08/16/24 0700 08/16/24 0701 - 08/17/24 0700 08/17/24 0701 - 08/18/24 0700     4132-7283 4929-5683 Total 1345-5593 6517-5272 Total 4693-5079 1269-4764 Total                    Intake    P.O.  240  -- 240  120  -- 120  120  -- 120    I.V.  310  -- 310  --  -- --  --  -- --    Blood  300  -- 300  --  -- --  --  -- --    Volume (Transfuse RBC Infuse Each Unit Over: 3.5H) 300 -- 300 -- -- -- -- -- --    IV Piggyback  100  100 200  100  200 300  --  -- --    Total Intake  220 200 420 120 -- 120       Output    Urine  200  -- 200  0  -- 0  --  -- --    Wound  --  -- --  0  -- 0  --  -- --    Dialysis  594  178 772  --  -- --  1607  -- 1607    Total Output 794 178 972 0 -- 0 1607 -- 1607             Physical Exam:    General Appearance : Lying on bed comfortably  Lungs : Bilateral decreased intensity of breath sounds, bibasilar crackles  Heart :  regular rhythm & normal rate, normal S1, S2 and no murmur, no rub  Abdomen : Soft, nondistended PD catheter site clear  Extremities : Bilateral 2+ upper extremity edema  Neurologic :   Unable to assess      Laboratory Data :     Albumin Albumin   Date Value Ref Range Status   08/16/2024 2.5 (L) 3.5 - 5.2 g/dL Final   08/15/2024 2.9 (L) 3.5 - 5.2 g/dL Final        Magnesium Magnesium   Date Value Ref Range Status   08/16/2024 2.2 1.6 - 2.4 mg/dL Final   08/15/2024 1.3 (L) 1.6 - 2.4 mg/dL Final            PTH               No results found for: \"PTH\"    CBC and coagulation:  Results from last 7 days   Lab Units 08/17/24  0029 08/16/24  0028 " 08/15/24  1116 08/15/24  0901 08/14/24  2314 08/14/24  1521 08/13/24  2329 08/13/24  2154 08/13/24  0616 08/13/24  0005 08/13/24  0004 08/12/24  1918   PROCALCITONIN ng/mL  --   --   --   --   --   --   --   --   --   --   --  74.29*   LACTATE mmol/L  --   --   --   --   --   --   --  1.5 2.5* 3.5*  --   --    CRP mg/dL  --   --   --   --   --   --   --   --   --   --   --  1.65*   WBC 10*3/mm3 3.80 3.56 4.07 3.97 4.31  --    < >  --   --   --    < >  --    HEMOGLOBIN g/dL 10.5* 11.0* 11.2* 11.4* 6.3*  --    < >  --   --   --    < >  --    HEMATOCRIT % 29.7* 31.5* 33.7* 33.6* 18.9*  --    < >  --   --   --    < >  --    MCV fL 83.0 84.5 88.9 85.1 93.1  --    < >  --   --   --    < >  --    MCHC g/dL 35.4 34.9 33.2 33.9 33.3  --    < >  --   --   --    < >  --    PLATELETS 10*3/mm3 41* 74* 64* 61* 79*  --    < >  --   --   --    < >  --    INR   --   --   --  0.88* 1.13* 1.20*   < > 2.13*  --   --   --   --     < > = values in this interval not displayed.     Acid/base balance:  Results from last 7 days   Lab Units 08/14/24  0503 08/13/24 2217 08/11/24  1214   PH, ARTERIAL pH units 7.411 7.399 7.423   PO2 ART mm Hg 72.0* 77.2* 106.0   PCO2, ARTERIAL mm Hg 34.6* 36.0 28.7*   HCO3 ART mmol/L 21.9 22.2 18.7*     Renal and electrolytes:    Results from last 7 days   Lab Units 08/17/24  0708 08/17/24  0029 08/16/24  0646 08/16/24  0028 08/15/24  0901 08/14/24  2314 08/13/24  2154 08/13/24  0745 08/13/24  0004 08/12/24  0312   SODIUM mmol/L 134* 138 136 137 137  --    < >  --    < > 136   POTASSIUM mmol/L 3.4* 2.3* 3.0* 3.3* 3.1*  --    < >  --    < > 3.3*   MAGNESIUM mg/dL  --   --   --  2.2 1.3*  --   --   --   --  1.3*   CHLORIDE mmol/L 101 102 98 99 98  --    < >  --    < > 104   CO2 mmol/L 21.4* 21.5* 20.1* 22.0 20.9*  --    < >  --    < > 15.9*   BUN mg/dL 28* 27* 24* 25* 23  --    < >  --    < > 18   CREATININE mg/dL 2.55* 2.41* 2.60* 2.61* 2.54*  --    < >  --    < > 2.85*   CALCIUM mg/dL 7.0* 7.0* 7.2* 7.2* 7.4*   --    < >  --    < > 5.7*   IONIZED CALCIUM mmol/L  --   --   --   --   --  0.95*  --  0.74*  --   --    PHOSPHORUS mg/dL  --   --   --  2.6 3.4  --   --   --   --   --     < > = values in this interval not displayed.     Estimated Creatinine Clearance: 16.5 mL/min (A) (by C-G formula based on SCr of 2.55 mg/dL (H)).  @GFRCG:3@   Liver and pancreatic function:  Results from last 7 days   Lab Units 08/16/24  0028 08/15/24  0901 08/14/24 0702 08/12/24 0312 08/11/24  1714   ALBUMIN g/dL 2.5* 2.9* 2.2*   < >  --    BILIRUBIN mg/dL 0.6 1.2 0.9   < >  --    ALK PHOS U/L 496* 422* 433*   < >  --    AST (SGOT) U/L 258* 400* 1,095*   < >  --    ALT (SGPT) U/L 30 32 73*   < >  --    AMMONIA umol/L  --   --   --   --  22    < > = values in this interval not displayed.         Cardiac:      Liver and pancreatic function:  Results from last 7 days   Lab Units 08/16/24  0028 08/15/24  0901 08/14/24  0702 08/12/24  0312 08/11/24  1714   ALBUMIN g/dL 2.5* 2.9* 2.2*   < >  --    BILIRUBIN mg/dL 0.6 1.2 0.9   < >  --    ALK PHOS U/L 496* 422* 433*   < >  --    AST (SGOT) U/L 258* 400* 1,095*   < >  --    ALT (SGPT) U/L 30 32 73*   < >  --    AMMONIA umol/L  --   --   --   --  22    < > = values in this interval not displayed.       Medications :     atorvastatin, 80 mg, Oral, Nightly  cholecalciferol, 50,000 Units, Oral, Q7 Days  ferrous sulfate, 325 mg, Oral, Daily With Breakfast  FLUoxetine, 10 mg, Oral, Daily  folic acid, 1 mg, Oral, Daily  [START ON 8/18/2024] insulin glargine, 10 Units, Subcutaneous, Daily  insulin lispro, 2-9 Units, Subcutaneous, 4x Daily AC & at Bedtime  lacosamide, 50 mg, Oral, Q12H  levothyroxine, 100 mcg, Oral, QAM AC  metoprolol tartrate, 25 mg, Oral, Q12H  pantoprazole, 40 mg, Oral, QAM AC  sodium chloride, 1,000 mL, Intravenous, Once  sodium chloride, 10 mL, Intravenous, Q12H  sodium chloride, 10 mL, Intravenous, Q12H  sodium chloride, 10 mL, Intravenous, Q12H  sodium chloride, 10 mL, Intravenous,  Q12H  sodium chloride, 10 mL, Intravenous, Q12H  traZODone, 25 mg, Oral, Nightly                 Assessment & Plan       -End-stage renal disease on peritoneal dialysis  -Hypotension  -Anemia  -Hypokalemia  -Type 2 diabetes mellitus with renal involvement  - Persistent nausea and vomiting  - Cachexia and malnutrition    Use 2.5% dialysate today as well    Hypokalemia, continue as needed potassium replacement    Metabolic acidosis, monitor    Poor nutritional status, will discuss in detail with RN emphasized on assisted feeding    Discussed with RN    Please avoid nephrotoxic medication and pharmacy to adjust the medication according to the GFR.      Nicholas Arroyo MD  08/17/24  10:27 EDT

## 2024-08-18 LAB
ANION GAP SERPL CALCULATED.3IONS-SCNC: 12.9 MMOL/L (ref 5–15)
ANISOCYTOSIS BLD QL: NORMAL
BACTERIA FLD CULT: NORMAL
BASOPHILS # BLD AUTO: 0.01 10*3/MM3 (ref 0–0.2)
BASOPHILS NFR BLD AUTO: 0.2 % (ref 0–1.5)
BUN SERPL-MCNC: 34 MG/DL (ref 8–23)
BUN/CREAT SERPL: 12 (ref 7–25)
CA-I SERPL ISE-MCNC: 0.81 MMOL/L (ref 1.12–1.32)
CALCIUM SPEC-SCNC: 6.8 MG/DL (ref 8.6–10.5)
CHLORIDE SERPL-SCNC: 102 MMOL/L (ref 98–107)
CO2 SERPL-SCNC: 22.1 MMOL/L (ref 22–29)
CREAT SERPL-MCNC: 2.83 MG/DL (ref 0.57–1)
DEPRECATED RDW RBC AUTO: 54.9 FL (ref 37–54)
EGFRCR SERPLBLD CKD-EPI 2021: 17.9 ML/MIN/1.73
EOSINOPHIL # BLD AUTO: 0.1 10*3/MM3 (ref 0–0.4)
EOSINOPHIL NFR BLD AUTO: 2.3 % (ref 0.3–6.2)
ERYTHROCYTE [DISTWIDTH] IN BLOOD BY AUTOMATED COUNT: 17.9 % (ref 12.3–15.4)
GLUCOSE BLDC GLUCOMTR-MCNC: 100 MG/DL (ref 70–130)
GLUCOSE BLDC GLUCOMTR-MCNC: 176 MG/DL (ref 70–130)
GLUCOSE BLDC GLUCOMTR-MCNC: 61 MG/DL (ref 70–130)
GLUCOSE BLDC GLUCOMTR-MCNC: 74 MG/DL (ref 70–130)
GLUCOSE BLDC GLUCOMTR-MCNC: 99 MG/DL (ref 70–130)
GLUCOSE SERPL-MCNC: 84 MG/DL (ref 65–99)
GRAM STN SPEC: NORMAL
GRAM STN SPEC: NORMAL
HCT VFR BLD AUTO: 33.3 % (ref 34–46.6)
HGB BLD-MCNC: 11.4 G/DL (ref 12–15.9)
IMM GRANULOCYTES # BLD AUTO: 0.03 10*3/MM3 (ref 0–0.05)
IMM GRANULOCYTES NFR BLD AUTO: 0.7 % (ref 0–0.5)
LYMPHOCYTES # BLD AUTO: 0.59 10*3/MM3 (ref 0.7–3.1)
LYMPHOCYTES NFR BLD AUTO: 13.6 % (ref 19.6–45.3)
MCH RBC QN AUTO: 29.3 PG (ref 26.6–33)
MCHC RBC AUTO-ENTMCNC: 34.2 G/DL (ref 31.5–35.7)
MCV RBC AUTO: 85.6 FL (ref 79–97)
MONOCYTES # BLD AUTO: 0.29 10*3/MM3 (ref 0.1–0.9)
MONOCYTES NFR BLD AUTO: 6.7 % (ref 5–12)
NEUTROPHILS NFR BLD AUTO: 3.31 10*3/MM3 (ref 1.7–7)
NEUTROPHILS NFR BLD AUTO: 76.5 % (ref 42.7–76)
NRBC BLD AUTO-RTO: 0 /100 WBC (ref 0–0.2)
PLATELET # BLD AUTO: 36 10*3/MM3 (ref 140–450)
PMV BLD AUTO: 11.3 FL (ref 6–12)
POIKILOCYTOSIS BLD QL SMEAR: NORMAL
POTASSIUM SERPL-SCNC: 2.7 MMOL/L (ref 3.5–5.2)
RBC # BLD AUTO: 3.89 10*6/MM3 (ref 3.77–5.28)
SMALL PLATELETS BLD QL SMEAR: NORMAL
SODIUM SERPL-SCNC: 137 MMOL/L (ref 136–145)
WBC NRBC COR # BLD AUTO: 4.33 10*3/MM3 (ref 3.4–10.8)

## 2024-08-18 PROCEDURE — 82948 REAGENT STRIP/BLOOD GLUCOSE: CPT

## 2024-08-18 PROCEDURE — 63710000001 INSULIN GLARGINE PER 5 UNITS: Performed by: INTERNAL MEDICINE

## 2024-08-18 PROCEDURE — 85007 BL SMEAR W/DIFF WBC COUNT: CPT | Performed by: FAMILY MEDICINE

## 2024-08-18 PROCEDURE — 94761 N-INVAS EAR/PLS OXIMETRY MLT: CPT

## 2024-08-18 PROCEDURE — 25010000002 HYDROMORPHONE PER 4 MG: Performed by: FAMILY MEDICINE

## 2024-08-18 PROCEDURE — 94799 UNLISTED PULMONARY SVC/PX: CPT

## 2024-08-18 PROCEDURE — 99231 SBSQ HOSP IP/OBS SF/LOW 25: CPT | Performed by: INTERNAL MEDICINE

## 2024-08-18 PROCEDURE — 63710000001 INSULIN LISPRO (HUMAN) PER 5 UNITS: Performed by: FAMILY MEDICINE

## 2024-08-18 PROCEDURE — 99233 SBSQ HOSP IP/OBS HIGH 50: CPT | Performed by: NURSE PRACTITIONER

## 2024-08-18 PROCEDURE — 82330 ASSAY OF CALCIUM: CPT | Performed by: FAMILY MEDICINE

## 2024-08-18 PROCEDURE — 80048 BASIC METABOLIC PNL TOTAL CA: CPT | Performed by: FAMILY MEDICINE

## 2024-08-18 PROCEDURE — 25010000002 POTASSIUM CHLORIDE 10 MEQ/100ML SOLUTION: Performed by: INTERNAL MEDICINE

## 2024-08-18 PROCEDURE — 99233 SBSQ HOSP IP/OBS HIGH 50: CPT | Performed by: FAMILY MEDICINE

## 2024-08-18 PROCEDURE — 85025 COMPLETE CBC W/AUTO DIFF WBC: CPT | Performed by: FAMILY MEDICINE

## 2024-08-18 RX ORDER — METOPROLOL TARTRATE 25 MG/1
12.5 TABLET, FILM COATED ORAL EVERY 12 HOURS SCHEDULED
Status: DISCONTINUED | OUTPATIENT
Start: 2024-08-18 | End: 2024-08-21 | Stop reason: HOSPADM

## 2024-08-18 RX ORDER — POTASSIUM CHLORIDE 7.45 MG/ML
10 INJECTION INTRAVENOUS
Status: DISPENSED | OUTPATIENT
Start: 2024-08-18 | End: 2024-08-18

## 2024-08-18 RX ADMIN — Medication 10 ML: at 08:10

## 2024-08-18 RX ADMIN — POTASSIUM CHLORIDE 10 MEQ: 7.46 INJECTION, SOLUTION INTRAVENOUS at 17:19

## 2024-08-18 RX ADMIN — POTASSIUM CHLORIDE 10 MEQ: 7.46 INJECTION, SOLUTION INTRAVENOUS at 20:46

## 2024-08-18 RX ADMIN — TRAZODONE HYDROCHLORIDE 25 MG: 50 TABLET ORAL at 22:28

## 2024-08-18 RX ADMIN — PANTOPRAZOLE SODIUM 40 MG: 40 TABLET, DELAYED RELEASE ORAL at 06:40

## 2024-08-18 RX ADMIN — LACOSAMIDE 50 MG: 50 TABLET, FILM COATED ORAL at 22:28

## 2024-08-18 RX ADMIN — INSULIN GLARGINE 10 UNITS: 100 INJECTION, SOLUTION SUBCUTANEOUS at 08:10

## 2024-08-18 RX ADMIN — Medication 10 ML: at 22:34

## 2024-08-18 RX ADMIN — ATORVASTATIN CALCIUM 80 MG: 40 TABLET, FILM COATED ORAL at 22:27

## 2024-08-18 RX ADMIN — HYDROMORPHONE HYDROCHLORIDE 0.5 MG: 1 INJECTION, SOLUTION INTRAMUSCULAR; INTRAVENOUS; SUBCUTANEOUS at 11:59

## 2024-08-18 RX ADMIN — LACOSAMIDE 50 MG: 50 TABLET, FILM COATED ORAL at 08:10

## 2024-08-18 RX ADMIN — POTASSIUM CHLORIDE 10 MEQ: 7.46 INJECTION, SOLUTION INTRAVENOUS at 19:50

## 2024-08-18 RX ADMIN — FERROUS SULFATE TAB 325 MG (65 MG ELEMENTAL FE) 325 MG: 325 (65 FE) TAB at 08:10

## 2024-08-18 RX ADMIN — FOLIC ACID 1 MG: 1 TABLET ORAL at 08:10

## 2024-08-18 RX ADMIN — OXYCODONE HYDROCHLORIDE 5 MG: 5 TABLET ORAL at 06:40

## 2024-08-18 RX ADMIN — METOPROLOL TARTRATE 12.5 MG: 25 TABLET, FILM COATED ORAL at 22:28

## 2024-08-18 RX ADMIN — METOPROLOL TARTRATE 25 MG: 25 TABLET, FILM COATED ORAL at 08:13

## 2024-08-18 RX ADMIN — LEVOTHYROXINE SODIUM 100 MCG: 0.1 TABLET ORAL at 06:40

## 2024-08-18 RX ADMIN — FLUOXETINE HYDROCHLORIDE 10 MG: 10 CAPSULE ORAL at 08:10

## 2024-08-18 RX ADMIN — INSULIN LISPRO 2 UNITS: 100 INJECTION, SOLUTION INTRAVENOUS; SUBCUTANEOUS at 22:27

## 2024-08-18 NOTE — PROGRESS NOTES
"Nephrology Progress Note      Subjective   No new complaints, minimally interactive.  Denied any chest pain or shortness of breath  Objective       Vital signs :     Temp:  [97.3 °F (36.3 °C)-97.8 °F (36.6 °C)] 97.7 °F (36.5 °C)  Heart Rate:  [56-68] 57  Resp:  [12-20] 12  BP: ()/() 153/79    Intake/Output                               08/16/24 0701 - 08/17/24 0700 08/17/24 0701 - 08/18/24 0700 08/18/24 0701 - 08/19/24 0700     1018-8765 0734-1856 Total 3850-7023 0852-4511 Total 8232-4294 6932-2513 Total                    Intake    P.O.  120  -- 120  799  -- 799  0  -- 0    IV Piggyback  100  200 300  --  -- --  --  -- --    Total Intake 220 200 420 799 -- 799 0 -- 0       Output    Urine  0  -- 0  0  -- 0  --  -- --    Wound  0  -- 0  --  -- --  --  -- --    Dialysis  --  -- --  1607  2500 4107  --  -- --    Total Output 0 -- 0 1607 2500 4107 -- -- --             Physical Exam:    General Appearance : Lying on bed comfortably  Lungs : Bilateral decreased intensity of breath sounds, bibasilar crackles  Heart :  regular rhythm & normal rate, normal S1, S2 and no murmur, no rub  Abdomen : Soft, nondistended PD catheter site clear  Extremities : Bilateral 2+ upper extremity edema  Neurologic :   Unable to assess      Laboratory Data :     Albumin Albumin   Date Value Ref Range Status   08/16/2024 2.5 (L) 3.5 - 5.2 g/dL Final        Magnesium Magnesium   Date Value Ref Range Status   08/16/2024 2.2 1.6 - 2.4 mg/dL Final            PTH               No results found for: \"PTH\"    CBC and coagulation:  Results from last 7 days   Lab Units 08/17/24  0029 08/16/24  0028 08/15/24  1116 08/15/24  0901 08/14/24  2314 08/14/24  1521 08/13/24  2329 08/13/24  2154 08/13/24  0616 08/13/24  0005 08/13/24  0004 08/12/24  1918   PROCALCITONIN ng/mL  --   --   --   --   --   --   --   --   --   --   --  74.29*   LACTATE mmol/L  --   --   --   --   --   --   --  1.5 2.5* 3.5*  --   --    CRP mg/dL  --   --   --   --   --   " --   --   --   --   --   --  1.65*   WBC 10*3/mm3 3.80 3.56 4.07 3.97 4.31  --    < >  --   --   --    < >  --    HEMOGLOBIN g/dL 10.5* 11.0* 11.2* 11.4* 6.3*  --    < >  --   --   --    < >  --    HEMATOCRIT % 29.7* 31.5* 33.7* 33.6* 18.9*  --    < >  --   --   --    < >  --    MCV fL 83.0 84.5 88.9 85.1 93.1  --    < >  --   --   --    < >  --    MCHC g/dL 35.4 34.9 33.2 33.9 33.3  --    < >  --   --   --    < >  --    PLATELETS 10*3/mm3 41* 74* 64* 61* 79*  --    < >  --   --   --    < >  --    INR   --   --   --  0.88* 1.13* 1.20*   < > 2.13*  --   --   --   --     < > = values in this interval not displayed.     Acid/base balance:  Results from last 7 days   Lab Units 08/14/24  0503 08/13/24  2217 08/11/24  1214   PH, ARTERIAL pH units 7.411 7.399 7.423   PO2 ART mm Hg 72.0* 77.2* 106.0   PCO2, ARTERIAL mm Hg 34.6* 36.0 28.7*   HCO3 ART mmol/L 21.9 22.2 18.7*     Renal and electrolytes:    Results from last 7 days   Lab Units 08/17/24  0708 08/17/24  0029 08/16/24  0646 08/16/24  0028 08/15/24  0901 08/14/24  2314 08/13/24  2154 08/13/24  0745 08/13/24  0004 08/12/24  0312   SODIUM mmol/L 134* 138 136 137 137  --    < >  --    < > 136   POTASSIUM mmol/L 3.4* 2.3* 3.0* 3.3* 3.1*  --    < >  --    < > 3.3*   MAGNESIUM mg/dL  --   --   --  2.2 1.3*  --   --   --   --  1.3*   CHLORIDE mmol/L 101 102 98 99 98  --    < >  --    < > 104   CO2 mmol/L 21.4* 21.5* 20.1* 22.0 20.9*  --    < >  --    < > 15.9*   BUN mg/dL 28* 27* 24* 25* 23  --    < >  --    < > 18   CREATININE mg/dL 2.55* 2.41* 2.60* 2.61* 2.54*  --    < >  --    < > 2.85*   CALCIUM mg/dL 7.0* 7.0* 7.2* 7.2* 7.4*  --    < >  --    < > 5.7*   IONIZED CALCIUM mmol/L  --   --   --   --   --  0.95*  --  0.74*  --   --    PHOSPHORUS mg/dL  --   --   --  2.6 3.4  --   --   --   --   --     < > = values in this interval not displayed.     Estimated Creatinine Clearance: 16.5 mL/min (A) (by C-G formula based on SCr of 2.55 mg/dL (H)).  @GFRCG:3@   Liver and  pancreatic function:  Results from last 7 days   Lab Units 08/16/24  0028 08/15/24  0901 08/14/24  0702 08/12/24  0312 08/11/24  1714   ALBUMIN g/dL 2.5* 2.9* 2.2*   < >  --    BILIRUBIN mg/dL 0.6 1.2 0.9   < >  --    ALK PHOS U/L 496* 422* 433*   < >  --    AST (SGOT) U/L 258* 400* 1,095*   < >  --    ALT (SGPT) U/L 30 32 73*   < >  --    AMMONIA umol/L  --   --   --   --  22    < > = values in this interval not displayed.         Cardiac:      Liver and pancreatic function:  Results from last 7 days   Lab Units 08/16/24  0028 08/15/24  0901 08/14/24  0702 08/12/24  0312 08/11/24  1714   ALBUMIN g/dL 2.5* 2.9* 2.2*   < >  --    BILIRUBIN mg/dL 0.6 1.2 0.9   < >  --    ALK PHOS U/L 496* 422* 433*   < >  --    AST (SGOT) U/L 258* 400* 1,095*   < >  --    ALT (SGPT) U/L 30 32 73*   < >  --    AMMONIA umol/L  --   --   --   --  22    < > = values in this interval not displayed.       Medications :     atorvastatin, 80 mg, Oral, Nightly  cholecalciferol, 50,000 Units, Oral, Q7 Days  ferrous sulfate, 325 mg, Oral, Daily With Breakfast  FLUoxetine, 10 mg, Oral, Daily  folic acid, 1 mg, Oral, Daily  insulin glargine, 10 Units, Subcutaneous, Daily  insulin lispro, 2-9 Units, Subcutaneous, 4x Daily AC & at Bedtime  lacosamide, 50 mg, Oral, Q12H  levothyroxine, 100 mcg, Oral, QAM AC  metoprolol tartrate, 12.5 mg, Oral, Q12H  pantoprazole, 40 mg, Oral, QAM AC  sodium chloride, 10 mL, Intravenous, Q12H  sodium chloride, 10 mL, Intravenous, Q12H  sodium chloride, 10 mL, Intravenous, Q12H  sodium chloride, 10 mL, Intravenous, Q12H  sodium chloride, 10 mL, Intravenous, Q12H  traZODone, 25 mg, Oral, Nightly                 Assessment & Plan       -End-stage renal disease on peritoneal dialysis  -Hypotension  -Anemia  -Hypokalemia  -Type 2 diabetes mellitus with renal involvement  - Persistent nausea and vomiting  - Cachexia and malnutrition    Continue using 2.5% dialysate with CCPD tonight as well    Hypokalemia, continue as  needed potassium replacement    Metabolic acidosis, monitor    Poor nutritional status, will discuss in detail with RN emphasized on assisted feeding    Discussed with RN    Please avoid nephrotoxic medication and pharmacy to adjust the medication according to the GFR.      Nicholas Arroyo MD  08/18/24  11:31 EDT

## 2024-08-18 NOTE — PLAN OF CARE
Goal Outcome Evaluation:  Plan of Care Reviewed With: patient        Progress: no change  Outcome Evaluation: Patient AOx4. VSS, afebrile. RA. NSR. Care ongoing.

## 2024-08-18 NOTE — PLAN OF CARE
Goal Outcome Evaluation:      Patient alert but confused to place and situation this shift; multiple loose/seedy bowel movements throughout the shift. Vital signs stable. Wound care performed. Pt with moderate pain.

## 2024-08-18 NOTE — PROGRESS NOTES
PROGRESS NOTE         Patient Identification:  Name:  Reny Elena  Age:  64 y.o.  Sex:  female  :  1960  MRN:  [id number]  Visit Number:  [id number]  Primary Care Provider:  Susan Stephens APRN         LOS: 15 days       ----------------------------------------------------------------------------------------------------------------------  Subjective       Chief Complaints:    Vomiting        Interval History:      Patient remains drowsy and sedate this morning.  Case discussed with primary RN Pallavi.  Patient having diarrhea.  C. difficile toxin PCR negative.  Lung sounds diminished bilaterally.  Continues with 2+ bilateral upper extremity edema and ecchymosis.  Afebrile.    Review of Systems:    EMMANUELLE due to confusion  ----------------------------------------------------------------------------------------------------------------------      Objective       Current Hospital Meds:  atorvastatin, 80 mg, Oral, Nightly  cholecalciferol, 50,000 Units, Oral, Q7 Days  ferrous sulfate, 325 mg, Oral, Daily With Breakfast  FLUoxetine, 10 mg, Oral, Daily  folic acid, 1 mg, Oral, Daily  insulin glargine, 10 Units, Subcutaneous, Daily  insulin lispro, 2-9 Units, Subcutaneous, 4x Daily AC & at Bedtime  lacosamide, 50 mg, Oral, Q12H  levothyroxine, 100 mcg, Oral, QAM AC  metoprolol tartrate, 12.5 mg, Oral, Q12H  pantoprazole, 40 mg, Oral, QAM AC  sodium chloride, 1,000 mL, Intravenous, Once  sodium chloride, 10 mL, Intravenous, Q12H  sodium chloride, 10 mL, Intravenous, Q12H  sodium chloride, 10 mL, Intravenous, Q12H  sodium chloride, 10 mL, Intravenous, Q12H  sodium chloride, 10 mL, Intravenous, Q12H  traZODone, 25 mg, Oral, Nightly             ----------------------------------------------------------------------------------------------------------------------    Vital Signs:  Temp:  [97.3 °F (36.3 °C)-97.8 °F (36.6 °C)] 97.7 °F (36.5 °C)  Heart Rate:  [56-68] 57  Resp:  [12-20] 12  BP: ()/()  153/79  Mean Arterial Pressure (Non-Invasive) for the past 24 hrs (Last 3 readings):   Noninvasive MAP (mmHg)   08/18/24 1000 108   08/18/24 0930 104   08/18/24 0900 106       SpO2 Percentage    08/18/24 0900 08/18/24 0930 08/18/24 1000   SpO2: 99% 99% 99%     SpO2:  [98 %-100 %] 99 %  on   ;   Device (Oxygen Therapy): room air    Body mass index is 18.19 kg/m².  Wt Readings from Last 3 Encounters:   08/17/24 48.1 kg (106 lb 0.7 oz)   07/26/24 45.4 kg (100 lb)   07/26/24 51.7 kg (114 lb)        Intake/Output Summary (Last 24 hours) at 8/18/2024 1053  Last data filed at 8/18/2024 0800  Gross per 24 hour   Intake 442 ml   Output 2500 ml   Net -2058 ml     Diet: Regular/House; Feeding Assistance - Nursing; Texture: Pureed (NDD 1); Fluid Consistency: Thin (IDDSI 0)  ----------------------------------------------------------------------------------------------------------------------      Physical Exam:    Constitutional: Frail elderly female drowsy and sedate this morning.  Sleeping during assessment.  On room air.  HENT:  Head: Normocephalic and atraumatic.  Mouth:  Moist mucous membranes.    Eyes:  Conjunctivae and EOM are normal.  No scleral icterus.  Neck:  Neck supple.  No JVD present.    Cardiovascular:  Normal rate, regular rhythm and normal heart sounds with no murmur.  Bilateral upper extreme edema.  Pulmonary/Chest: Diminished to auscultation bilaterally no respiratory distress, no wheezes, no crackles, with normal breath sounds and good air movement.  Abdominal:  peritoneal dialysis catheter. Soft.  Bowel sounds are normal.  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  Neurological:  Alert and oriented to person, place, and time.  No facial droop.  No slurred speech.    Skin: Bleeding noted to left IJ central line insertion site.  Right AKA stump is dressed, clean dry and  intact      ----------------------------------------------------------------------------------------------------------------------            Results from last 7 days   Lab Units 08/14/24  0503   PH, ARTERIAL pH units 7.411   PO2 ART mm Hg 72.0*   PCO2, ARTERIAL mm Hg 34.6*   HCO3 ART mmol/L 21.9     Results from last 7 days   Lab Units 08/17/24  0029 08/16/24  0028 08/15/24  1116 08/15/24  0901 08/14/24  2314 08/14/24  1521 08/14/24  0702 08/13/24  2329 08/13/24  2154 08/13/24  0616 08/13/24  0005 08/13/24  0004 08/12/24  1918   CRP mg/dL  --   --   --   --   --   --   --   --   --   --   --   --  1.65*   LACTATE mmol/L  --   --   --   --   --   --   --   --  1.5 2.5* 3.5*  --   --    WBC 10*3/mm3 3.80 3.56 4.07 3.97 4.31  --  5.70   < >  --   --   --    < >  --    HEMOGLOBIN g/dL 10.5* 11.0* 11.2* 11.4* 6.3*  --  9.2*   < >  --   --   --    < >  --    HEMATOCRIT % 29.7* 31.5* 33.7* 33.6* 18.9*  --  26.8*   < >  --   --   --    < >  --    MCV fL 83.0 84.5 88.9 85.1 93.1  --  92.7   < >  --   --   --    < >  --    MCHC g/dL 35.4 34.9 33.2 33.9 33.3  --  34.3   < >  --   --   --    < >  --    PLATELETS 10*3/mm3 41* 74* 64* 61* 79*  --  104*   < >  --   --   --    < >  --    INR   --   --   --  0.88* 1.13* 1.20* 1.46*  --  2.13*  --   --   --   --     < > = values in this interval not displayed.     Results from last 7 days   Lab Units 08/17/24  0708 08/17/24  0029 08/16/24  0646 08/16/24  0028 08/15/24  0901 08/14/24  0702 08/13/24  0004 08/12/24  0312   SODIUM mmol/L 134* 138 136 137 137 134*   < > 136   POTASSIUM mmol/L 3.4* 2.3* 3.0* 3.3* 3.1* 3.7   < > 3.3*   MAGNESIUM mg/dL  --   --   --  2.2 1.3*  --   --  1.3*   CHLORIDE mmol/L 101 102 98 99 98 100   < > 104   CO2 mmol/L 21.4* 21.5* 20.1* 22.0 20.9* 18.5*   < > 15.9*   BUN mg/dL 28* 27* 24* 25* 23 21   < > 18   CREATININE mg/dL 2.55* 2.41* 2.60* 2.61* 2.54* 3.03*   < > 2.85*   CALCIUM mg/dL 7.0* 7.0* 7.2* 7.2* 7.4* 6.8*   < > 5.7*   GLUCOSE mg/dL 163* 311*  "233* 216* 163* 136*   < > 229*   ALBUMIN g/dL  --   --   --  2.5* 2.9* 2.2*   < > 1.4*   BILIRUBIN mg/dL  --   --   --  0.6 1.2 0.9   < > 0.3   ALK PHOS U/L  --   --   --  496* 422* 433*   < > 196*   AST (SGOT) U/L  --   --   --  258* 400* 1,095*   < > 2,747*   ALT (SGPT) U/L  --   --   --  30 32 73*   < > 322*    < > = values in this interval not displayed.   Estimated Creatinine Clearance: 16.5 mL/min (A) (by C-G formula based on SCr of 2.55 mg/dL (H)).  No results found for: \"AMMONIA\"      Glucose   Date/Time Value Ref Range Status   08/18/2024 0816 100 70 - 130 mg/dL Final   08/17/2024 2107 286 (H) 70 - 130 mg/dL Final   08/17/2024 1735 108 70 - 130 mg/dL Final   08/17/2024 1119 210 (H) 70 - 130 mg/dL Final   08/17/2024 0853 194 (H) 70 - 130 mg/dL Final   08/16/2024 2148 249 (H) 70 - 130 mg/dL Final   08/16/2024 1731 156 (H) 70 - 130 mg/dL Final   08/16/2024 1104 253 (H) 70 - 130 mg/dL Final     Lab Results   Component Value Date    HGBA1C 6.10 (H) 08/02/2024     Lab Results   Component Value Date    TSH 3.580 08/13/2024    FREET4 1.08 08/14/2024       Blood Culture   Date Value Ref Range Status   08/02/2024 No growth at 24 hours  Preliminary   08/02/2024 No growth at 24 hours  Preliminary     No results found for: \"URINECX\"  No results found for: \"WOUNDCX\"  No results found for: \"STOOLCX\"  No results found for: \"RESPCX\"  Pain Management Panel  More data exists         Latest Ref Rng & Units 7/21/2024 4/28/2024   Pain Management Panel   Amphetamine, Urine Qual Negative Negative  Negative    Barbiturates Screen, Urine Negative Negative  Negative    Benzodiazepine Screen, Urine Negative Negative  Negative    Buprenorphine, Screen, Urine Negative Negative  Negative    Cocaine Screen, Urine Negative Negative  Negative    Fentanyl, Urine Negative Negative  Negative    Methadone Screen , Urine Negative Negative  Negative    Methamphetamine, Ur Negative Negative  Negative       Details             "         ----------------------------------------------------------------------------------------------------------------------  Imaging Results (Last 24 Hours)       Procedure Component Value Units Date/Time    CT Abdomen Pelvis Without Contrast [[id number]] Collected: 08/17/24 1709     Updated: 08/17/24 1716    Narrative:      PROCEDURE: CT of the abdomen and pelvis performed without IV contrast on  August 17, 2024. Examination was performed with 5 mm axial imaging and 5  mm sagittal and coronal reconstruction images. The examination was  performed according to as low as reasonably achievable dose protocol.     HISTORY: Pain. Vomiting.     COMPARISON: None.     FINDINGS:     Normal heart size with trace volume of pericardial fluid.  Small left pleural effusion with compressive atelectasis at the left  lung base.  Small volume of free fluid in the abdomen and pelvis.  No acute process seen in the liver, spleen, gallbladder, pancreas,  adrenal glands, and kidneys.  Mild chronic bilateral renal cortical volume loss.  Peritoneal dialysis catheter identified in the left lateral aspect of  the abdomen.  Moderate stranding in the subcutaneous fat consistent with edema.  No free air.  Dense calcifications identified throughout the aortic abdominal and  pelvic branch segments.  No acute process seen in the bladder.  No free air, abscess, or hematoma.  Mild edematous appearance to the large and small bowel due to peritoneal  fluid.       Impression:         1.  Mild chronic renal cortical volume loss  2.  Significant volume of dense calcifications throughout the abdominal  aortic and pelvic aortic branch segments consistent with atherosclerotic  disease.  3.  Small left pleural effusion with left lower lobe atelectasis.  4.  Small volume of fluid in the abdomen and pelvis with peritoneal  dialysis catheter noted to terminate in the lateral left abdomen.  5.  Moderate stranding in the subcutaneous fat consistent with edema  in  the abdominal wall and pelvic wall extending into the proximal right and  left lower extremity.  6.  Degenerative disc disease throughout the lumbar spine with endplate  and facet hypertrophic changes.  7.  No free air, abscess, or hematoma.     This report was finalized on 8/17/2024 5:13 PM by Carlos Andrews MD.               ----------------------------------------------------------------------------------------------------------------------    Pertinent Infectious Disease Results    Thank you Dr. Crawford for allowing us to participate in the care of your patient.  As you well know, Ms. Reny Elena is a 64 y.o. female with past medical history significant for seizure disorder, type 2 diabetes, arthritis, CHF, hypothyroidism, hypertension, hyperlipidemia, GERD, PAD, ESRD, iron deficiency anemia,, who presented to [facility] Emergency Department on 8/2/2024 for nausea, vomiting, diarrhea and worsening pain of right diabetic foot wound.  Patient was recently seen in our ED on 7/21/2024 with complaints of hallucinations, fever, and right foot pain.  At this time, patient was diagnosed with right heel ulcer and UTI.  She was sent home with an antibiotic but was unsure of the name.  Patient reported she started having nausea and vomiting after taking the antibiotics so she stopped taking them.  However, she continued to have nausea and vomiting.       Patient was tachycardic in the ED.Labs show K+ at upper limit of normal, BUN 30, cr 4.36, glucose 176, alk phos 323 and albumin 2.9, otherwise normal LFTs. CRP mildly elevated at 2.34, while lactate and WBC are normal. UA shows large leuk esterase and unable to determine RBC, WBC, bacteria of squamous cells due to loaded field.  Patient's urine culture from 7/21/2024 grew E. coli.  She had also been following with wound care who it obtained a wound culture on the same day.  This culture finalized with mixed gram-negative maurisio and light growth of  Enterococcus faecalis.       X-ray of the right foot performed on 8/2/2024 revealed partial amputation of the fifth metatarsal. Bony demineralization. No acute fracture or dislocation. No lytic or blastic bony lesions seen. Diffuse interphalangeal joint space loss. Mild degenerative changes in the midfoot. No cortical erosion. Fixation timur in the tibia/talus/calcaneus noted. Diffuse soft tissue swelling. Vascular calcifications. No soft tissue gas.  CT abdomen/pelvis revealed peritoneal dialysis catheter noted in the anterior right abdominal wall with the catheter noted to terminate in the left lateral pelvis. Small volume of free fluid in the right upper quadrant and lower posterior pelvis and right lower quadrant. Slightly elevated left hemidiaphragm. No bowel or renal obstruction. No abdominal aortic aneurysm. No features of acute appendicitis. Stranding identified in the presacral space could present changes related to mild distal colitis. Fluid-filled bladder with small volume of air in the bladder lumen. No pneumatosis. No conclusive features of free air. Distended configuration to the gallbladder likely due to fasting. No conclusive features of gastritis or enteritis.       Patient continued to have nausea and vomiting after 2 doses of Zofran.  She was admitted for further treatment.  Subsequently, patient was started on IV Rocephin in the setting of E. coli UTI.  Flagyl was added to cover possible colitis.  Patient was also started on IV vancomycin for Enterococcus faecalis wound infection.  General surgery consulted for possible debridement of patient's wound.     Assessment/Plan       Assessment     R foot wound infection, Enterococcus faecalis  Possible osteomyelitis of R foot  Hardware present in RLE  E. Coli UTI  Possible colitis         Plan      Patient remains drowsy and sedate this morning.  Case discussed with primary GLORIA Olivo.  Patient having diarrhea.  C. difficile toxin PCR negative.  Lung  sounds diminished bilaterally.  Continues with 2+ bilateral upper extremity edema and ecchymosis.  Afebrile.    CT of the abdomen pelvis from 8/17/2024 reports mild chronic renal cortical volume loss, significant volume of dense calcifications throughout the abdominal aortic impact pelvic aortic branch segments consistent with atherosclerotic disease, small left pleural effusion and left lower lobe atelectasis, small volume of fluid in the abdomen and pelvis with peritoneal dialysis catheter noted to terminate in the lateral left abdomen, moderate stranding in the subcutaneous fat consistent with edema in the abdominal wall and pelvic wall extending into the proximal right left lower extremity, degenerative disc disease throughout the lumbar spine with endplate and facet hypertrophic changes.    Peritoneal body fluid culture shows no growth thus far.    Patient completed empiric courses of vancomycin and Zosyn.  Patient overall stable off of antibiotic therapy at this time.  Will continue to monitor off of antibiotic coverage for now.       ANTIMICROBIAL THERAPY    cefdinir - 300 MG     Code Status:   Code Status and Medical Interventions: No CPR (Do Not Attempt to Resuscitate); Limited Support; No intubation (DNI)   Ordered at: 08/04/24 1645     Medical Intervention Limits:    No intubation (DNI)     Code Status (Patient has no pulse and is not breathing):    No CPR (Do Not Attempt to Resuscitate)     Medical Interventions (Patient has pulse or is breathing):    Limited Support       DARIUS Moreno  08/18/24  10:53 EDT

## 2024-08-18 NOTE — PROGRESS NOTES
Pulmonary and critical care progress note    Chief complaint -generalized fatigue      Subjective-  Overnight events reviewed.  All the labs medications ins and outs and vitals reviewed.  Patient will be transferred out of the ICU.  Latest labs reviewed.  Platelet count remains on the lower side    Review of Systems  Not reliable as patient is a poor historian.  No changes.        History  Past Medical History:   Diagnosis Date    Arthritis     Closed fracture of right fibula and tibia 2018    Depression     Diabetic ulcer of left foot associated with type 2 diabetes mellitus 2017    Diastolic dysfunction     Disease of thyroid gland     Elevated cholesterol     End stage renal disease     Essential hypertension     GERD (gastroesophageal reflux disease)     History of transfusion     Hyperlipidemia     Iron deficiency anemia 2018    PAD (peripheral artery disease)     Renal failure     Type 2 diabetes mellitus with hyperglycemia, with long-term current use of insulin 2018   ,   Past Surgical History:   Procedure Laterality Date    ABDOMINAL SURGERY      ABOVE KNEE AMPUTATION Right 2024    Procedure: AMPUTATION ABOVE KNEE;  Surgeon: Angelo Santoro MD;  Location: [facility];  Service: General;  Laterality: Right;    BACK SURGERY      Post spinal diskectomy, osteophytectomy in one lumbar interspace    CATARACT EXTRACTION      Left      SECTION      DENTAL PROCEDURE      ENDOSCOPY      FRACTURE SURGERY      right leg    HYSTERECTOMY      INCISION AND DRAINAGE LEG Left 4/15/2017    Procedure: INCISION AND DRAINAGE LOWER EXTREMITY;  Surgeon: Basilio Morris MD;  Location: [facility] OR;  Service:     INCISION AND DRAINAGE LEG Left 2017    Procedure: Irrigation and Debridment abcess diabetic wound left foot with deep culture;  Surgeon: Basilio Morris MD;  Location: [facility] OR;  Service:     INSERTION PERITONEAL DIALYSIS CATHETER N/A 2021    Procedure: INSERTION PERITONEAL  DIALYSIS CATHETER LAPAROSCOPIC;  Surgeon: Edy Glover MD;  Location:  [facility] OR;  Service: General;  Laterality: N/A;    KNEE ARTHROSCOPY Right     ORIF TIBIA FRACTURE Right 12/28/2018    Procedure: TIBIAL  FRACTURE OPEN REDUCTION INTERNAL FIXATION;  Surgeon: Jung Barragan MD;  Location:  [facility] OR;  Service: Orthopedics    TOE AMPUTATION Right     5th    TUBAL ABDOMINAL LIGATION     ,   Family History   Problem Relation Age of Onset    Heart disease Mother     Cancer Mother     COPD Mother     Diabetes Other     Depression Other    ,   Social History     Tobacco Use    Smoking status: Never     Passive exposure: Never    Smokeless tobacco: Never   Vaping Use    Vaping status: Never Used   Substance Use Topics    Alcohol use: No    Drug use: No   ,   Medications Prior to Admission   Medication Sig Dispense Refill Last Dose    aspirin 81 MG EC tablet Take 1 tablet by mouth Daily.   8/2/2024    cefdinir (OMNICEF) 300 MG capsule Take 1 capsule by mouth Every Other Day.   8/2/2024    ferrous sulfate 325 (65 FE) MG tablet Take 1 tablet by mouth Daily With Breakfast.   8/2/2024    FLUoxetine (PROzac) 40 MG capsule Take 1 capsule by mouth Daily.   8/2/2024    fluticasone (FLONASE) 50 MCG/ACT nasal spray 2 sprays into the nostril(s) as directed by provider Daily As Needed for Rhinitis.   Past Week    folic acid (FOLVITE) 1 MG tablet Take 1 tablet by mouth Daily.   8/2/2024    HYDROcodone-acetaminophen (NORCO)  MG per tablet Take 1 tablet by mouth 3 (Three) Times a Day As Needed for Moderate Pain.   8/2/2024    hydrOXYzine (ATARAX) 25 MG tablet Take 1 tablet by mouth 3 (Three) Times a Day As Needed for Anxiety. 15 tablet 0 8/2/2024    Insulin Aspart, w/Niacinamide, (Fiasp) 100 UNIT/ML solution Inject 2-7 Units as directed 4 (Four) Times a Day Before Meals & at Bedtime.   8/2/2024    lacosamide (VIMPAT) 50 MG tablet tablet Take 1 tablet by mouth Every 12 (Twelve) Hours.   8/2/2024    levothyroxine (SYNTHROID,  LEVOTHROID) 100 MCG tablet Take 1 tablet by mouth Daily.   8/2/2024    lidocaine (LIDODERM) 5 % Place 1 patch on the skin as directed by provider Daily As Needed for Mild Pain. Remove & Discard patch within 12 hours or as directed by MD   Past Week    losartan (COZAAR) 50 MG tablet Take 1 tablet by mouth Daily.   8/2/2024    NIFEdipine XL (PROCARDIA XL) 90 MG 24 hr tablet Take 1 tablet by mouth Daily.   8/2/2024    ondansetron ODT (ZOFRAN-ODT) 4 MG disintegrating tablet Place 1 tablet on the tongue 3 (Three) Times a Day As Needed for Nausea or Vomiting.   Past Week    pantoprazole (PROTONIX) 40 MG EC tablet Take 1 tablet by mouth Daily.   8/2/2024    potassium chloride (KLOR-CON M20) 20 MEQ CR tablet Take 1 tablet by mouth 2 (Two) Times a Day.   8/2/2024    rOPINIRole (REQUIP) 0.5 MG tablet Take 1 tablet by mouth 2 (Two) Times a Day.   8/2/2024    tiZANidine (ZANAFLEX) 4 MG tablet Take 1 tablet by mouth Every 6 (Six) Hours As Needed for Muscle Spasms.   8/2/2024    atorvastatin (LIPITOR) 80 MG tablet Take 1 tablet by mouth Every Night. 30 tablet 0 8/1/2024    traZODone (DESYREL) 50 MG tablet Take 1 tablet by mouth Every Night.   8/1/2024   , Scheduled Meds:  atorvastatin, 80 mg, Oral, Nightly  cholecalciferol, 50,000 Units, Oral, Q7 Days  ferrous sulfate, 325 mg, Oral, Daily With Breakfast  FLUoxetine, 10 mg, Oral, Daily  folic acid, 1 mg, Oral, Daily  insulin glargine, 10 Units, Subcutaneous, Daily  insulin lispro, 2-9 Units, Subcutaneous, 4x Daily AC & at Bedtime  lacosamide, 50 mg, Oral, Q12H  levothyroxine, 100 mcg, Oral, QAM AC  metoprolol tartrate, 12.5 mg, Oral, Q12H  pantoprazole, 40 mg, Oral, QAM AC  sodium chloride, 1,000 mL, Intravenous, Once  sodium chloride, 10 mL, Intravenous, Q12H  sodium chloride, 10 mL, Intravenous, Q12H  sodium chloride, 10 mL, Intravenous, Q12H  sodium chloride, 10 mL, Intravenous, Q12H  sodium chloride, 10 mL, Intravenous, Q12H  traZODone, 25 mg, Oral, Nightly    , Continuous  Infusions:      and Allergies:  Morphine, Penicillins, Codeine, and Sulfa antibiotics    Objective     Vital Signs   Temp:  [97.3 °F (36.3 °C)-97.8 °F (36.6 °C)] 97.3 °F (36.3 °C)  Heart Rate:  [58-71] 58  Resp:  [12-22] 14  BP: ()/() 114/64    Physical Exam:             General-chronically ill in appearance  HEENT-atraumatic    Neck-supple    Respiratory-respirations normal.  Not in any respiratory distress.    Cardiovascular-NSR  GI-grossly normal    CNS-nonfocal    Musculoskeletal -no edema, right above-knee amputation      Psychiatric-alert awake oriented                                                                    Results Review:    LABS:    Lab Results   Component Value Date    GLUCOSE 163 (H) 08/17/2024    BUN 28 (H) 08/17/2024    CREATININE 2.55 (H) 08/17/2024    EGFRIFNONA 8 (L) 02/06/2022    EGFRIFAFRI  02/06/2022      Comment:      <15 Indicative of kidney failure.    BCR 11.0 08/17/2024    CO2 21.4 (L) 08/17/2024    CALCIUM 7.0 (L) 08/17/2024    PROTENTOTREF 6.1 12/08/2020    ALBUMIN 2.5 (L) 08/16/2024    LABIL2 1.2 12/08/2020     (H) 08/16/2024    ALT 30 08/16/2024    WBC 3.80 08/17/2024    HGB 10.5 (L) 08/17/2024    HCT 29.7 (L) 08/17/2024    MCV 83.0 08/17/2024    PLT 41 (C) 08/17/2024     (L) 08/17/2024    K 3.4 (L) 08/17/2024     08/17/2024    ANIONGAP 11.6 08/17/2024       Lab Results   Component Value Date    INR 0.88 (L) 08/15/2024    INR 1.13 (H) 08/14/2024    INR 1.20 (H) 08/14/2024    PROTIME 12.0 (L) 08/15/2024    PROTIME 14.6 08/14/2024    PROTIME 15.3 (H) 08/14/2024       Results from last 7 days   Lab Units 08/15/24  0901 08/14/24  2314 08/14/24  1521 08/14/24  0702 08/13/24  2154   INR  0.88* 1.13* 1.20* 1.46* 2.13*   APTT seconds  --  43.3*  --  96.4* 146.4*          I reviewed the patient's new clinical results.  I reviewed the patient's new imaging results and agree with the interpretation.  Microbiology Results (last 10 days)       Procedure  Component Value - Date/Time    Clostridioides difficile Toxin - Stool, Per Rectum [[id number]] Collected: 08/17/24 1546    Lab Status: Final result Specimen: Stool from Per Rectum Updated: 08/17/24 1700    Narrative:      The following orders were created for panel order Clostridioides difficile Toxin - Stool, Per Rectum.  Procedure                               Abnormality         Status                     ---------                               -----------         ------                     Clostridioides difficile...[[id number]]                      Final result                 Please view results for these tests on the individual orders.    Clostridioides difficile Toxin, PCR - Stool, Per Rectum [[id number]] Collected: 08/17/24 1546    Lab Status: Final result Specimen: Stool from Per Rectum Updated: 08/17/24 1700     Toxigenic C. difficile by PCR Negative     027 Toxin Presumptive Negative    Narrative:      The result indicates the absence of toxigenic C. difficile from stool specimen.     Fungus Culture - Body Fluid, Peritoneal Catheter [[id number]] Collected: 08/13/24 1253    Lab Status: Preliminary result Specimen: Body Fluid from Peritoneal Catheter Updated: 08/14/24 1908     Fungus Stain Final report     KOH/Calcofluor preparation Comment     Comment: KOH/Calcofluor preparation:  no fungus observed.       Narrative:      Performed at:  [address] [place] [address]  [id number]  : Mahendra Finney PhD, Phone:  [phone]    Body Fluid Culture - Body Fluid, Peritoneum [[id number]] Collected: 08/13/24 1253    Lab Status: Preliminary result Specimen: Body Fluid from Peritoneum Updated: 08/17/24 1300     Body Fluid Culture No growth at 4 days     Gram Stain WBCs seen      No organisms seen    Blood Culture - Blood, Arm, Left [[id number]]  (Normal) Collected: 08/12/24 1917    Lab Status: Final result Specimen: Blood from Arm, Left Updated: 08/17/24 1931     Blood Culture No growth at  5 days    MRSA Screen, PCR (Inpatient) - Swab, Nares [[id number]]  (Normal) Collected: 08/11/24 9547    Lab Status: Final result Specimen: Swab from Nares Updated: 08/11/24 1716     MRSA PCR No MRSA Detected    Narrative:      The negative predictive value of this diagnostic test is high and should only be used to consider de-escalating anti-MRSA therapy. A positive result may indicate colonization with MRSA and must be correlated clinically.             Assessment & Plan     Neurology-alert awake able to protect her airway.  Continue to monitor neurological status    Respiratory-latest chest x-ray reviewed.  FiO2 requirement reviewed and adjusted to maintain saturation 88 to 92%.    Latest VBG reviewed.    Cardiology-   Septic shock-likely due to the foot ulcer and then amputation.  Remains off vasopressors.    Tolerated discontinuing the steroids.  Steroids remains off.  Tolerated discontinue midodrine.      Hypertension-continue antihypertensives  Patient's blood pressure is remaining good.  Discontinue hydrocortisone if sugars were on the higher side.  Continue to monitor HR- rate and rhythm, BP         Lab 08/13/24  2154 08/13/24  0616 08/13/24  0005 08/12/24  1812 08/12/24  1312   LACTATE 1.5 2.5* 3.5* 2.3* 2.3*      Nephrology- Cr and BUN stable  I/O-reviewed.  Creatinine elevated.  Nephrology on case.  Case was briefly discussed with them.  Continue management as per them.  Continue fluids as per nephrology.    Ionized calcium remains on the lower side.  Recommend replating.  Will get levels tomorrow morning  Vitamin D levels on the lower side.  Started replacing.  Labs in a.m.-ordered      GI- PPI prophylaxis  Continue current diet.  .  Transaminitis-likely due to shock liver.  AST and ALT are better.  Levels better  Bleeding through central line-resolved      Narrative & Impression   PROCEDURE: Abdominal ultrasound evaluation performed on August 11, 2024.  Color flow imaging performed.     HISTORY: Elevated  liver function tests.     COMPARISON: None.     FINDINGS:     Echogenic liver suggestive of fatty infiltration.  Small volume of free fluid adjacent to the liver consistent with  ascites.  Patent hepatic veins with appropriate direction of flow.  Patent main portal vein with appropriate direction of flow.  No hepatic mass  No dilated intrahepatic bile ducts  Sludge noted in the gallbladder lumen.  No gallbladder wall thickening or para cholecystic fluid.  No right renal hydronephrosis  No features of cholecystitis.     IMPRESSION:     1.  Echogenic liver suggestive of fatty infiltration.  2.  Patent main portal vein with appropriate direction of flow.  3.  Patent hepatic veins with appropriate direction of flow.  4.  Sludge noted in the gallbladder lumen.  5.  No features of cholecystitis.  6.  Small volume of free fluid in the right upper quadrant consistent  with ascites.  7.  No hepatic mass or cyst  8.  No dilated intrahepatic bile ducts.          Hematology- CBC  Hb-transfuse for hemoglobin less than 7      Thrombocytopenia-if continues to drop recommend hematology consult.  Discontinue Zosyn.  CBC in the morning.    ID sepsis and septic shock-likely due to the ulcer.  Status post amputation.    Culture-reviewed  And Antibiotics-continue.  Cultures reviewed  Cultures remain negative.  Endrocrinology- Maintain Blood sugar 140 -180   Latest Reference Range & Units 08/13/24 00:04 08/13/24 23:32   TSH Baseline 0.270 - 4.200 uIU/mL  3.580   Free T4 0.92 - 1.68 ng/dL 1.88 (H)    (H): Data is abnormally high    Repeat free T4 within normal limits.    Electrolytes-   Mag and phos       DVT prophylaxis-    Bedside rounds were done with RT and patient's nurse. All the lab and clinical findings were discussed with them and plan was also discussed in great detail.    Family member present-none    Overall prognosis poor.  Consulted palliative care team.    Echo-    Results for orders placed during the hospital encounter of  08/19/23    Adult Transthoracic Echo Complete W/ Cont if Necessary Per Protocol    Interpretation Summary    Left ventricular systolic function is normal. Left ventricular ejection fraction appears to be 56 - 60%.    Left ventricular diastolic function is consistent with (grade I) impaired relaxation.    Left atrial volume is mildly increased.    Moderate mitral valve stenosis is present.    Estimated right ventricular systolic pressure from tricuspid regurgitation is mildly elevated (35-45 mmHg).    Mild pulmonary hypertension is present.    There is no evidence of pericardial effusion.      Patient is stable from pulmonary standpoint of view.  Pulmonary and critical care will sign off.  Please feel free to call us with any questions or concerns.  This service is provided via audio video tele medicine   I am in [place] and patient is in [facility]          Diabetic ulcer of right heel          Johnson Rider MD  08/18/24  08:19 EDT

## 2024-08-18 NOTE — PROGRESS NOTES
[facility]     Patient Identification:  Name:  Reny Elena  Age:  64 y.o.  Sex:  female  :  1960  MRN:  [id number]  Visit Number:  [id number]  Primary Care Provider:  Susan Stephens APRN    Length of stay:  15    Subjective: Patient seen and examined, p patient seems to be much more comfortable today.  She has had to receive the Dilaudid throughout the night.  She can be downgraded to the general medical floor  Chief Complaint: Septic shock  ----------------------------------------------------------------------------------------------------------------------  Current Hospital Meds:  atorvastatin, 80 mg, Oral, Nightly  cholecalciferol, 50,000 Units, Oral, Q7 Days  ferrous sulfate, 325 mg, Oral, Daily With Breakfast  FLUoxetine, 10 mg, Oral, Daily  folic acid, 1 mg, Oral, Daily  insulin glargine, 10 Units, Subcutaneous, Daily  insulin lispro, 2-9 Units, Subcutaneous, 4x Daily AC & at Bedtime  lacosamide, 50 mg, Oral, Q12H  levothyroxine, 100 mcg, Oral, QAM AC  metoprolol tartrate, 12.5 mg, Oral, Q12H  pantoprazole, 40 mg, Oral, QAM AC  sodium chloride, 10 mL, Intravenous, Q12H  sodium chloride, 10 mL, Intravenous, Q12H  sodium chloride, 10 mL, Intravenous, Q12H  sodium chloride, 10 mL, Intravenous, Q12H  sodium chloride, 10 mL, Intravenous, Q12H  traZODone, 25 mg, Oral, Nightly         ----------------------------------------------------------------------------------------------------------------------  Vital Signs:  Temp:  [97.3 °F (36.3 °C)-98.8 °F (37.1 °C)] 98.8 °F (37.1 °C)  Heart Rate:  [56-68] 58  Resp:  [12-] 12  BP: ()/() 116/68       Tele: Sinus rhythm 73 bpm      24  0630 08/15/24  0500 24  0600   Weight: 46.5 kg (102 lb 8.2 oz) 47.1 kg (103 lb 13.4 oz) 48.1 kg (106 lb 0.7 oz)     Body mass index is 18.19 kg/m².    Intake/Output Summary (Last 24 hours) at 2024 3404  Last data filed at 2024 1200  Gross per 24 hour    Intake 490 ml   Output 2500 ml   Net -2010 ml     Diet: Regular/House; Feeding Assistance - Nursing; Texture: Pureed (NDD 1); Fluid Consistency: Thin (IDDSI 0)  ----------------------------------------------------------------------------------------------------------------------  Physical exam:  General: Chronically ill-appearing, awake and conversant, she appears uncomfortable.  She is not confused.  No respiratory distress.    Skin:  Skin is warm and dry. No rash noted. No pallor.    HENT:  Head:  Normocephalic and atraumatic.  Mouth:  Moist mucous membranes.  Edentulous  Eyes:  Conjunctivae and EOM are normal.  Pupils are equal, round, and reactive to light.  No scleral icterus.    Neck:  Neck supple.  No JVD present.    Pulmonary/Chest:  No respiratory distress, no wheezes, no crackles, with normal breath sounds and good air movement.  Cardiovascular:  Normal rate, regular rhythm and normal heart sounds with no murmur.  Abdominal: PD in place, anasarca, soft.  Bowel sounds are normal.  No distension but with tenderness with minimal palpation  Extremities: Pitting edema with anasarca upper and lower extremity.  No cyanosis or clubbing.   Neurological: She has no motor deficit, no slurring of speech,   Genitourinary: No Doshi catheter  Back:  ----------------------------------------------------------------------------------------------------------------------  ----------------------------------------------------------------------------------------------------------------------        Results from last 7 days   Lab Units 08/17/24  0029 08/16/24  0028 08/15/24  1116 08/15/24  0901 08/14/24  2314 08/14/24  1521 08/14/24  0702 08/13/24  2329 08/13/24  2154 08/13/24  0616 08/13/24  0005 08/13/24  0004 08/12/24  1918   CRP mg/dL  --   --   --   --   --   --   --   --   --   --   --   --  1.65*   LACTATE mmol/L  --   --   --   --   --   --   --   --  1.5 2.5* 3.5*  --   --    WBC 10*3/mm3 3.80 3.56 4.07 3.97 4.31  --   "5.70   < >  --   --   --    < >  --    HEMOGLOBIN g/dL 10.5* 11.0* 11.2* 11.4* 6.3*  --  9.2*   < >  --   --   --    < >  --    HEMATOCRIT % 29.7* 31.5* 33.7* 33.6* 18.9*  --  26.8*   < >  --   --   --    < >  --    MCV fL 83.0 84.5 88.9 85.1 93.1  --  92.7   < >  --   --   --    < >  --    MCHC g/dL 35.4 34.9 33.2 33.9 33.3  --  34.3   < >  --   --   --    < >  --    PLATELETS 10*3/mm3 41* 74* 64* 61* 79*  --  104*   < >  --   --   --    < >  --    INR   --   --   --  0.88* 1.13* 1.20* 1.46*  --  2.13*  --   --   --   --     < > = values in this interval not displayed.     Results from last 7 days   Lab Units 08/14/24  0503   PH, ARTERIAL pH units 7.411   PO2 ART mm Hg 72.0*   PCO2, ARTERIAL mm Hg 34.6*   HCO3 ART mmol/L 21.9     Results from last 7 days   Lab Units 08/17/24  0708 08/17/24  0029 08/16/24  0646 08/16/24  0028 08/15/24  0901 08/14/24  0702 08/13/24  0004 08/12/24  0312   SODIUM mmol/L 134* 138 136 137 137 134*   < > 136   POTASSIUM mmol/L 3.4* 2.3* 3.0* 3.3* 3.1* 3.7   < > 3.3*   MAGNESIUM mg/dL  --   --   --  2.2 1.3*  --   --  1.3*   CHLORIDE mmol/L 101 102 98 99 98 100   < > 104   CO2 mmol/L 21.4* 21.5* 20.1* 22.0 20.9* 18.5*   < > 15.9*   BUN mg/dL 28* 27* 24* 25* 23 21   < > 18   CREATININE mg/dL 2.55* 2.41* 2.60* 2.61* 2.54* 3.03*   < > 2.85*   CALCIUM mg/dL 7.0* 7.0* 7.2* 7.2* 7.4* 6.8*   < > 5.7*   GLUCOSE mg/dL 163* 311* 233* 216* 163* 136*   < > 229*   ALBUMIN g/dL  --   --   --  2.5* 2.9* 2.2*   < > 1.4*   BILIRUBIN mg/dL  --   --   --  0.6 1.2 0.9   < > 0.3   ALK PHOS U/L  --   --   --  496* 422* 433*   < > 196*   AST (SGOT) U/L  --   --   --  258* 400* 1,095*   < > 2,747*   ALT (SGPT) U/L  --   --   --  30 32 73*   < > 322*    < > = values in this interval not displayed.   Estimated Creatinine Clearance: 16.5 mL/min (A) (by C-G formula based on SCr of 2.55 mg/dL (H)).    No results found for: \"AMMONIA\"      Blood Culture   Date Value Ref Range Status   08/12/2024 No growth at 3 days  " "Preliminary     No results found for: \"URINECX\"  No results found for: \"WOUNDCX\"  No results found for: \"STOOLCX\"    I have personally looked at the labs and they are summarized above.  ----------------------------------------------------------------------------------------------------------------------  Imaging Results (Last 24 Hours)       Procedure Component Value Units Date/Time    CT Abdomen Pelvis Without Contrast [[id number]] Collected: 08/17/24 1709     Updated: 08/17/24 1716    Narrative:      PROCEDURE: CT of the abdomen and pelvis performed without IV contrast on  August 17, 2024. Examination was performed with 5 mm axial imaging and 5  mm sagittal and coronal reconstruction images. The examination was  performed according to as low as reasonably achievable dose protocol.     HISTORY: Pain. Vomiting.     COMPARISON: None.     FINDINGS:     Normal heart size with trace volume of pericardial fluid.  Small left pleural effusion with compressive atelectasis at the left  lung base.  Small volume of free fluid in the abdomen and pelvis.  No acute process seen in the liver, spleen, gallbladder, pancreas,  adrenal glands, and kidneys.  Mild chronic bilateral renal cortical volume loss.  Peritoneal dialysis catheter identified in the left lateral aspect of  the abdomen.  Moderate stranding in the subcutaneous fat consistent with edema.  No free air.  Dense calcifications identified throughout the aortic abdominal and  pelvic branch segments.  No acute process seen in the bladder.  No free air, abscess, or hematoma.  Mild edematous appearance to the large and small bowel due to peritoneal  fluid.       Impression:         1.  Mild chronic renal cortical volume loss  2.  Significant volume of dense calcifications throughout the abdominal  aortic and pelvic aortic branch segments consistent with atherosclerotic  disease.  3.  Small left pleural effusion with left lower lobe atelectasis.  4.  Small volume of fluid in " the abdomen and pelvis with peritoneal  dialysis catheter noted to terminate in the lateral left abdomen.  5.  Moderate stranding in the subcutaneous fat consistent with edema in  the abdominal wall and pelvic wall extending into the proximal right and  left lower extremity.  6.  Degenerative disc disease throughout the lumbar spine with endplate  and facet hypertrophic changes.  7.  No free air, abscess, or hematoma.     This report was finalized on 8/17/2024 5:13 PM by Carlos Andrews MD.             ----------------------------------------------------------------------------------------------------------------------  Assessment and Plan:  -Septic shock requiring pressor support improved now off pressors  -Acute ischemic hepatitis resolved  -Abdominal pain possibly secondary to above versus intestinal angina  - Intra-abdominal atherosclerotic disease  -End-stage renal disease on PD  -Severe malnutrition patient's intake is improved  -Right ankle osteomyelitis status post above-knee amputation  -Severe debility  -Edentulous  -Severe hypoalbuminemia  -Acute metabolic encephalopathy from sepsis improved  -Acute hypokalemia  -Diabetes type 2 insulin requiring with hyperglycemia  -Diabetes related hypoglycemia  -Coagulopathy multifactorial, improved  -Acute blood loss anemia from surgery, left central line bleeding requiring 4 units packed RBC  -Anorexia improved  -Acute thrombocytopenia improving etiology?  Sepsis + - dilution, HIT workup pending  -Anasarca  -Hypertension suboptimal control      C. difficile panel was negative  CT scan of the abdomen only showed volume of dense calcification throughout the abdominal aortic and pelvic aortic branch segments consistent with atherosclerotic disease.  Now questionable of some intestinal angina  Patient may benefit [facility]  Patient seems to be slowly improving.  Her appetite has improved and she is having less pain after eating      Disposition pending, patient will most likely  require longer term PT before going home      Yandel Ortega,   08/18/24  14:54 EDT

## 2024-08-19 LAB
ALBUMIN SERPL-MCNC: 2.1 G/DL (ref 3.5–5.2)
ALBUMIN/GLOB SERPL: 0.9 G/DL
ALP SERPL-CCNC: 488 U/L (ref 39–117)
ALT SERPL W P-5'-P-CCNC: 26 U/L (ref 1–33)
ANION GAP SERPL CALCULATED.3IONS-SCNC: 12.5 MMOL/L (ref 5–15)
ANISOCYTOSIS BLD QL: NORMAL
AST SERPL-CCNC: 77 U/L (ref 1–32)
BASOPHILS # BLD AUTO: 0 10*3/MM3 (ref 0–0.2)
BASOPHILS NFR BLD AUTO: 0 % (ref 0–1.5)
BH BB BLOOD EXPIRATION DATE: NORMAL
BH BB BLOOD TYPE BARCODE: 5100
BH BB DISPENSE STATUS: NORMAL
BH BB PRODUCT CODE: NORMAL
BH BB UNIT NUMBER: NORMAL
BILIRUB SERPL-MCNC: 0.5 MG/DL (ref 0–1.2)
BUN SERPL-MCNC: 37 MG/DL (ref 8–23)
BUN/CREAT SERPL: 13.2 (ref 7–25)
CALCIUM SPEC-SCNC: 7 MG/DL (ref 8.6–10.5)
CHLORIDE SERPL-SCNC: 102 MMOL/L (ref 98–107)
CO2 SERPL-SCNC: 23.5 MMOL/L (ref 22–29)
CREAT SERPL-MCNC: 2.8 MG/DL (ref 0.57–1)
CROSSMATCH INTERPRETATION: NORMAL
DEPRECATED RDW RBC AUTO: 52.5 FL (ref 37–54)
EGFRCR SERPLBLD CKD-EPI 2021: 18.1 ML/MIN/1.73
EOSINOPHIL # BLD AUTO: 0.07 10*3/MM3 (ref 0–0.4)
EOSINOPHIL NFR BLD AUTO: 1.6 % (ref 0.3–6.2)
ERYTHROCYTE [DISTWIDTH] IN BLOOD BY AUTOMATED COUNT: 17.5 % (ref 12.3–15.4)
GLOBULIN UR ELPH-MCNC: 2.3 GM/DL
GLUCOSE BLDC GLUCOMTR-MCNC: 159 MG/DL (ref 70–130)
GLUCOSE BLDC GLUCOMTR-MCNC: 186 MG/DL (ref 70–130)
GLUCOSE BLDC GLUCOMTR-MCNC: 187 MG/DL (ref 70–130)
GLUCOSE BLDC GLUCOMTR-MCNC: 254 MG/DL (ref 70–130)
GLUCOSE SERPL-MCNC: 188 MG/DL (ref 65–99)
HCT VFR BLD AUTO: 34.9 % (ref 34–46.6)
HGB BLD-MCNC: 12.1 G/DL (ref 12–15.9)
IMM GRANULOCYTES # BLD AUTO: 0.02 10*3/MM3 (ref 0–0.05)
IMM GRANULOCYTES NFR BLD AUTO: 0.5 % (ref 0–0.5)
LYMPHOCYTES # BLD AUTO: 0.45 10*3/MM3 (ref 0.7–3.1)
LYMPHOCYTES NFR BLD AUTO: 10.5 % (ref 19.6–45.3)
MAGNESIUM SERPL-MCNC: 2 MG/DL (ref 1.6–2.4)
MCH RBC QN AUTO: 29.2 PG (ref 26.6–33)
MCHC RBC AUTO-ENTMCNC: 34.7 G/DL (ref 31.5–35.7)
MCV RBC AUTO: 84.3 FL (ref 79–97)
MONOCYTES # BLD AUTO: 0.21 10*3/MM3 (ref 0.1–0.9)
MONOCYTES NFR BLD AUTO: 4.9 % (ref 5–12)
NEUTROPHILS NFR BLD AUTO: 3.52 10*3/MM3 (ref 1.7–7)
NEUTROPHILS NFR BLD AUTO: 82.5 % (ref 42.7–76)
NRBC BLD AUTO-RTO: 0 /100 WBC (ref 0–0.2)
PLATELET # BLD AUTO: 32 10*3/MM3 (ref 140–450)
PMV BLD AUTO: 11.5 FL (ref 6–12)
POTASSIUM SERPL-SCNC: 2.7 MMOL/L (ref 3.5–5.2)
POTASSIUM SERPL-SCNC: 2.9 MMOL/L (ref 3.5–5.2)
PROT SERPL-MCNC: 4.4 G/DL (ref 6–8.5)
RBC # BLD AUTO: 4.14 10*6/MM3 (ref 3.77–5.28)
SMALL PLATELETS BLD QL SMEAR: NORMAL
SODIUM SERPL-SCNC: 138 MMOL/L (ref 136–145)
UNIT  ABO: NORMAL
UNIT  RH: NORMAL
WBC NRBC COR # BLD AUTO: 4.27 10*3/MM3 (ref 3.4–10.8)

## 2024-08-19 PROCEDURE — 63710000001 INSULIN GLARGINE PER 5 UNITS: Performed by: FAMILY MEDICINE

## 2024-08-19 PROCEDURE — 83735 ASSAY OF MAGNESIUM: CPT | Performed by: INTERNAL MEDICINE

## 2024-08-19 PROCEDURE — 63710000001 INSULIN LISPRO (HUMAN) PER 5 UNITS: Performed by: FAMILY MEDICINE

## 2024-08-19 PROCEDURE — 84132 ASSAY OF SERUM POTASSIUM: CPT | Performed by: INTERNAL MEDICINE

## 2024-08-19 PROCEDURE — 82948 REAGENT STRIP/BLOOD GLUCOSE: CPT

## 2024-08-19 PROCEDURE — 99232 SBSQ HOSP IP/OBS MODERATE 35: CPT | Performed by: INTERNAL MEDICINE

## 2024-08-19 PROCEDURE — 94799 UNLISTED PULMONARY SVC/PX: CPT

## 2024-08-19 PROCEDURE — 25010000002 HYDRALAZINE PER 20 MG: Performed by: FAMILY MEDICINE

## 2024-08-19 PROCEDURE — 85007 BL SMEAR W/DIFF WBC COUNT: CPT | Performed by: FAMILY MEDICINE

## 2024-08-19 PROCEDURE — 85025 COMPLETE CBC W/AUTO DIFF WBC: CPT | Performed by: FAMILY MEDICINE

## 2024-08-19 PROCEDURE — 99232 SBSQ HOSP IP/OBS MODERATE 35: CPT | Performed by: NURSE PRACTITIONER

## 2024-08-19 PROCEDURE — 80053 COMPREHEN METABOLIC PANEL: CPT | Performed by: FAMILY MEDICINE

## 2024-08-19 RX ORDER — GENTAMICIN SULFATE 1 MG/G
1 OINTMENT TOPICAL DAILY
Status: DISCONTINUED | OUTPATIENT
Start: 2024-08-19 | End: 2024-08-21 | Stop reason: HOSPADM

## 2024-08-19 RX ORDER — POTASSIUM CHLORIDE 1500 MG/1
40 TABLET, EXTENDED RELEASE ORAL DAILY
Status: DISCONTINUED | OUTPATIENT
Start: 2024-08-19 | End: 2024-08-20

## 2024-08-19 RX ORDER — POTASSIUM CHLORIDE 1500 MG/1
20 TABLET, EXTENDED RELEASE ORAL ONCE
Status: DISCONTINUED | OUTPATIENT
Start: 2024-08-19 | End: 2024-08-19

## 2024-08-19 RX ADMIN — Medication 10 ML: at 09:41

## 2024-08-19 RX ADMIN — LEVOTHYROXINE SODIUM 100 MCG: 0.1 TABLET ORAL at 09:31

## 2024-08-19 RX ADMIN — VITAMINS A AND D OINTMENT: 15.5; 53.4 OINTMENT TOPICAL at 21:10

## 2024-08-19 RX ADMIN — INSULIN LISPRO 2 UNITS: 100 INJECTION, SOLUTION INTRAVENOUS; SUBCUTANEOUS at 09:31

## 2024-08-19 RX ADMIN — PANTOPRAZOLE SODIUM 40 MG: 40 TABLET, DELAYED RELEASE ORAL at 09:31

## 2024-08-19 RX ADMIN — FERROUS SULFATE TAB 325 MG (65 MG ELEMENTAL FE) 325 MG: 325 (65 FE) TAB at 09:30

## 2024-08-19 RX ADMIN — FLUOXETINE HYDROCHLORIDE 10 MG: 10 CAPSULE ORAL at 09:31

## 2024-08-19 RX ADMIN — LACOSAMIDE 50 MG: 50 TABLET, FILM COATED ORAL at 20:54

## 2024-08-19 RX ADMIN — Medication 10 ML: at 20:55

## 2024-08-19 RX ADMIN — METOPROLOL TARTRATE 12.5 MG: 25 TABLET, FILM COATED ORAL at 09:44

## 2024-08-19 RX ADMIN — HYDRALAZINE HYDROCHLORIDE 10 MG: 20 INJECTION INTRAMUSCULAR; INTRAVENOUS at 02:36

## 2024-08-19 RX ADMIN — METOPROLOL TARTRATE 12.5 MG: 25 TABLET, FILM COATED ORAL at 20:54

## 2024-08-19 RX ADMIN — LACOSAMIDE 50 MG: 50 TABLET, FILM COATED ORAL at 09:39

## 2024-08-19 RX ADMIN — OXYCODONE HYDROCHLORIDE 5 MG: 5 TABLET ORAL at 11:57

## 2024-08-19 RX ADMIN — INSULIN GLARGINE 10 UNITS: 100 INJECTION, SOLUTION SUBCUTANEOUS at 09:40

## 2024-08-19 RX ADMIN — INSULIN LISPRO 6 UNITS: 100 INJECTION, SOLUTION INTRAVENOUS; SUBCUTANEOUS at 17:24

## 2024-08-19 RX ADMIN — INSULIN LISPRO 2 UNITS: 100 INJECTION, SOLUTION INTRAVENOUS; SUBCUTANEOUS at 21:10

## 2024-08-19 RX ADMIN — ATORVASTATIN CALCIUM 80 MG: 40 TABLET, FILM COATED ORAL at 20:54

## 2024-08-19 RX ADMIN — TRAZODONE HYDROCHLORIDE 25 MG: 50 TABLET ORAL at 20:54

## 2024-08-19 RX ADMIN — POTASSIUM CHLORIDE 40 MEQ: 1500 TABLET, EXTENDED RELEASE ORAL at 09:41

## 2024-08-19 NOTE — PROGRESS NOTES
Adult Nutrition  Assessment/PES    Patient Name:  Reny Elena  YOB: 1960  MRN: [id number]  Admit Date:  8/2/2024    Assessment Date:  8/19/2024    Comments:  follow-up    Po ave 30% of meals.    Novasource Renal shakes can meet most of needs if pt consumes regularly    Encourage po and voice food prefs.    Will cont follow and monitor.     Reason for Assessment       Row Name 08/19/24 1416          Reason for Assessment    Reason For Assessment follow-up protocol     Diagnosis infection/sepsis;renal disease;diabetes diagnosis/complications  R heel ulcer exposed bone OM-s/p AKA, UTI, ESRD on PD, DM , Severe Malnutrition                    Nutrition/Diet History       Row Name 08/19/24 1417          Nutrition/Diet History    Typical Intake (Food/Fluid/EN/PN) Called to room no answer. Po varies.                    Labs/Tests/Procedures/Meds       Row Name 08/19/24 1417          Labs/Procedures/Meds    Lab Results Reviewed reviewed     Lab Results Comments BUn 37/Creat 2.8 H, glu 159-188        Diagnostic Tests/Procedures    Diagnostic Test/Procedure Reviewed reviewed        Medications    Pertinent Medications Reviewed reviewed     Pertinent Medications Comments KCL, vitamin D, folic acid, lantus, humalog                        Nutrition Prescription Ordered       Row Name 08/19/24 1418          Nutrition Prescription PO    Current PO Diet Pureed     Fluid Consistency Thin     Supplement Novasource Renal ([place]ro)     Supplement Frequency 3 times a day                    Evaluation of Received Nutrient/Fluid Intake       Row Name 08/19/24 1418          Fluid Intake Evaluation    Oral Fluid (mL) 454  ave x 5        PO Evaluation    Number of Meals 14     % PO Intake 30                       Problem/Interventions:   Problem 1       Row Name 08/19/24 1419          Nutrition Diagnoses Problem 1    Problem 1 Other (comment)  Severe chronic disease related malnutrition related to Heel Infection, UTI, ESRD,  DM as evidenced by less 75% of est energy requirement >= 1 month, severe muscle wasting & fat loss per RD exam, > 7.5% loss BW 3 months.                          Intervention Goal       Row Name 08/19/24 1419          Intervention Goal    General Meet nutritional needs for age/condition     PO Increase intake;PO intake (%)     PO Intake % 50 %                    Nutrition Intervention       Row Name 08/19/24 1419          Nutrition Intervention    RD/Tech Action Encourage intake;Follow Tx progress;Supplement provided                      Education/Evaluation       Row Name 08/19/24 1419          Education    Education Education not appropriate at this time        Monitor/Evaluation    Monitor Per protocol;I&O;PO intake;Supplement intake;Pertinent labs;Weight                     Electronically signed by:  Lay Gonzalez RD  08/19/24 14:20 EDT

## 2024-08-19 NOTE — PROGRESS NOTES
PROGRESS NOTE         Patient Identification:  Name:  Reny Elena  Age:  64 y.o.  Sex:  female  :  1960  MRN:  [id number]  Visit Number:  [id number]  Primary Care Provider:  Susan Stephens APRN         LOS: 16 days       ----------------------------------------------------------------------------------------------------------------------  Subjective       Chief Complaints:    Vomiting        Interval History:      Patient awake and alert this morning.  Complains of right lower extremity pain.  Afebrile.  Lungs clear to auscultation bilaterally.  Abdomen soft, nontender.  Right AKA stump with no surrounding erythema or drainage.  WBC normal at 4.27.    Review of Systems:    MEMANUELLE due to confusion  ----------------------------------------------------------------------------------------------------------------------      Objective       Current Hospital Meds:  atorvastatin, 80 mg, Oral, Nightly  cholecalciferol, 50,000 Units, Oral, Q7 Days  ferrous sulfate, 325 mg, Oral, Daily With Breakfast  FLUoxetine, 10 mg, Oral, Daily  folic acid, 1 mg, Oral, Daily  gentamicin, 1 Application, Topical, Daily  insulin glargine, 10 Units, Subcutaneous, Daily  insulin lispro, 2-9 Units, Subcutaneous, 4x Daily AC & at Bedtime  lacosamide, 50 mg, Oral, Q12H  levothyroxine, 100 mcg, Oral, QAM AC  metoprolol tartrate, 12.5 mg, Oral, Q12H  pantoprazole, 40 mg, Oral, QAM AC  potassium chloride, 40 mEq, Oral, Daily  sodium chloride, 10 mL, Intravenous, Q12H  sodium chloride, 10 mL, Intravenous, Q12H  sodium chloride, 10 mL, Intravenous, Q12H  sodium chloride, 10 mL, Intravenous, Q12H  sodium chloride, 10 mL, Intravenous, Q12H  traZODone, 25 mg, Oral, Nightly             ----------------------------------------------------------------------------------------------------------------------    Vital Signs:  Temp:  [97.6 °F (36.4 °C)-98.8 °F (37.1 °C)] 98.2 °F (36.8 °C)  Heart Rate:  [57-68] 68  Resp:  [12-20] 18  BP:  (103-185)/(63-96) 124/70  Mean Arterial Pressure (Non-Invasive) for the past 24 hrs (Last 3 readings):   Noninvasive MAP (mmHg)   08/18/24 1800 86   08/18/24 1730 112   08/18/24 1700 102       SpO2 Percentage    08/18/24 1821 08/19/24 0656 08/19/24 0803   SpO2: 100% 92% 99%     SpO2:  [92 %-100 %] 99 %  on   ;   Device (Oxygen Therapy): room air    Body mass index is 17.06 kg/m².  Wt Readings from Last 3 Encounters:   08/19/24 45.1 kg (99 lb 6.4 oz)   07/26/24 45.4 kg (100 lb)   07/26/24 51.7 kg (114 lb)        Intake/Output Summary (Last 24 hours) at 8/19/2024 1057  Last data filed at 8/19/2024 0900  Gross per 24 hour   Intake 1210 ml   Output 1500 ml   Net -290 ml     Diet: Regular/House; Feeding Assistance - Nursing; Texture: Pureed (NDD 1); Fluid Consistency: Thin (IDDSI 0)  ----------------------------------------------------------------------------------------------------------------------      Physical Exam:    Constitutional: Frail elderly female drowsy and sedate this morning.  Awake and alert this morning.  Complains of right lower extremity pain.  HENT:  Head: Normocephalic and atraumatic.  Mouth:  Moist mucous membranes.    Eyes:  Conjunctivae and EOM are normal.  No scleral icterus.  Neck:  Neck supple.  No JVD present.    Cardiovascular:  Normal rate, regular rhythm and normal heart sounds with no murmur.  Bilateral upper extreme edema.  Pulmonary/Chest: Diminished to auscultation bilaterally no respiratory distress, no wheezes, no crackles, with normal breath sounds and good air movement.  Abdominal:  peritoneal dialysis catheter. Soft.  Bowel sounds are normal.  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  Neurological:  Alert and oriented to person, place, and time.  No facial droop.  No slurred speech.    Skin: Bleeding noted to left IJ central line insertion site.  Right AKA stump is dressed, clean dry and  intact      ----------------------------------------------------------------------------------------------------------------------            Results from last 7 days   Lab Units 08/14/24  0503   PH, ARTERIAL pH units 7.411   PO2 ART mm Hg 72.0*   PCO2, ARTERIAL mm Hg 34.6*   HCO3 ART mmol/L 21.9     Results from last 7 days   Lab Units 08/19/24  0153 08/18/24  1553 08/17/24  0029 08/15/24  1116 08/15/24  0901 08/14/24  2314 08/14/24  1521 08/14/24  0702 08/13/24  2329 08/13/24  2154 08/13/24  0616 08/13/24  0005 08/13/24  0004 08/12/24  1918   CRP mg/dL  --   --   --   --   --   --   --   --   --   --   --   --   --  1.65*   LACTATE mmol/L  --   --   --   --   --   --   --   --   --  1.5 2.5* 3.5*  --   --    WBC 10*3/mm3 4.27 4.33 3.80   < > 3.97 4.31  --  5.70   < >  --   --   --    < >  --    HEMOGLOBIN g/dL 12.1 11.4* 10.5*   < > 11.4* 6.3*  --  9.2*   < >  --   --   --    < >  --    HEMATOCRIT % 34.9 33.3* 29.7*   < > 33.6* 18.9*  --  26.8*   < >  --   --   --    < >  --    MCV fL 84.3 85.6 83.0   < > 85.1 93.1  --  92.7   < >  --   --   --    < >  --    MCHC g/dL 34.7 34.2 35.4   < > 33.9 33.3  --  34.3   < >  --   --   --    < >  --    PLATELETS 10*3/mm3 32* 36* 41*   < > 61* 79*  --  104*   < >  --   --   --    < >  --    INR   --   --   --   --  0.88* 1.13* 1.20* 1.46*  --  2.13*  --   --   --   --     < > = values in this interval not displayed.     Results from last 7 days   Lab Units 08/19/24  0153 08/18/24  1553 08/17/24  0708 08/16/24  0646 08/16/24  0028 08/15/24  0901   SODIUM mmol/L 138 137 134*   < > 137 137   POTASSIUM mmol/L 2.7* 2.7* 3.4*   < > 3.3* 3.1*   MAGNESIUM mg/dL 2.0  --   --   --  2.2 1.3*   CHLORIDE mmol/L 102 102 101   < > 99 98   CO2 mmol/L 23.5 22.1 21.4*   < > 22.0 20.9*   BUN mg/dL 37* 34* 28*   < > 25* 23   CREATININE mg/dL 2.80* 2.83* 2.55*   < > 2.61* 2.54*   CALCIUM mg/dL 7.0* 6.8* 7.0*   < > 7.2* 7.4*   GLUCOSE mg/dL 188* 84 163*   < > 216* 163*   ALBUMIN g/dL 2.1*  --   --  "  --  2.5* 2.9*   BILIRUBIN mg/dL 0.5  --   --   --  0.6 1.2   ALK PHOS U/L 488*  --   --   --  496* 422*   AST (SGOT) U/L 77*  --   --   --  258* 400*   ALT (SGPT) U/L 26  --   --   --  30 32    < > = values in this interval not displayed.   Estimated Creatinine Clearance: 14.1 mL/min (A) (by C-G formula based on SCr of 2.8 mg/dL (H)).  No results found for: \"AMMONIA\"      Glucose   Date/Time Value Ref Range Status   08/19/2024 0645 159 (H) 70 - 130 mg/dL Final   08/18/2024 2043 176 (H) 70 - 130 mg/dL Final   08/18/2024 1708 99 70 - 130 mg/dL Final   08/18/2024 1234 74 70 - 130 mg/dL Final   08/18/2024 1138 61 (L) 70 - 130 mg/dL Final   08/18/2024 0816 100 70 - 130 mg/dL Final   08/17/2024 2107 286 (H) 70 - 130 mg/dL Final   08/17/2024 1735 108 70 - 130 mg/dL Final     Lab Results   Component Value Date    HGBA1C 6.10 (H) 08/02/2024     Lab Results   Component Value Date    TSH 3.580 08/13/2024    FREET4 1.08 08/14/2024       Blood Culture   Date Value Ref Range Status   08/02/2024 No growth at 24 hours  Preliminary   08/02/2024 No growth at 24 hours  Preliminary     No results found for: \"URINECX\"  No results found for: \"WOUNDCX\"  No results found for: \"STOOLCX\"  No results found for: \"RESPCX\"  Pain Management Panel  More data exists         Latest Ref Rng & Units 7/21/2024 4/28/2024   Pain Management Panel   Amphetamine, Urine Qual Negative Negative  Negative    Barbiturates Screen, Urine Negative Negative  Negative    Benzodiazepine Screen, Urine Negative Negative  Negative    Buprenorphine, Screen, Urine Negative Negative  Negative    Cocaine Screen, Urine Negative Negative  Negative    Fentanyl, Urine Negative Negative  Negative    Methadone Screen , Urine Negative Negative  Negative    Methamphetamine, Ur Negative Negative  Negative       Details                     ----------------------------------------------------------------------------------------------------------------------  Imaging Results (Last 24 " Hours)       ** No results found for the last 24 hours. **            ----------------------------------------------------------------------------------------------------------------------    Pertinent Infectious Disease Results    Thank you Dr. Crawford for allowing us to participate in the care of your patient.  As you well know, Ms. Reny Elena is a 64 y.o. female with past medical history significant for seizure disorder, type 2 diabetes, arthritis, CHF, hypothyroidism, hypertension, hyperlipidemia, GERD, PAD, ESRD, iron deficiency anemia,, who presented to [facility] Emergency Department on 8/2/2024 for nausea, vomiting, diarrhea and worsening pain of right diabetic foot wound.  Patient was recently seen in our ED on 7/21/2024 with complaints of hallucinations, fever, and right foot pain.  At this time, patient was diagnosed with right heel ulcer and UTI.  She was sent home with an antibiotic but was unsure of the name.  Patient reported she started having nausea and vomiting after taking the antibiotics so she stopped taking them.  However, she continued to have nausea and vomiting.       Patient was tachycardic in the ED.Labs show K+ at upper limit of normal, BUN 30, cr 4.36, glucose 176, alk phos 323 and albumin 2.9, otherwise normal LFTs. CRP mildly elevated at 2.34, while lactate and WBC are normal. UA shows large leuk esterase and unable to determine RBC, WBC, bacteria of squamous cells due to loaded field.  Patient's urine culture from 7/21/2024 grew E. coli.  She had also been following with wound care who it obtained a wound culture on the same day.  This culture finalized with mixed gram-negative maurisio and light growth of Enterococcus faecalis.       X-ray of the right foot performed on 8/2/2024 revealed partial amputation of the fifth metatarsal. Bony demineralization. No acute fracture or dislocation. No lytic or blastic bony lesions seen. Diffuse interphalangeal joint space loss. Mild  degenerative changes in the midfoot. No cortical erosion. Fixation timur in the tibia/talus/calcaneus noted. Diffuse soft tissue swelling. Vascular calcifications. No soft tissue gas.  CT abdomen/pelvis revealed peritoneal dialysis catheter noted in the anterior right abdominal wall with the catheter noted to terminate in the left lateral pelvis. Small volume of free fluid in the right upper quadrant and lower posterior pelvis and right lower quadrant. Slightly elevated left hemidiaphragm. No bowel or renal obstruction. No abdominal aortic aneurysm. No features of acute appendicitis. Stranding identified in the presacral space could present changes related to mild distal colitis. Fluid-filled bladder with small volume of air in the bladder lumen. No pneumatosis. No conclusive features of free air. Distended configuration to the gallbladder likely due to fasting. No conclusive features of gastritis or enteritis.       Patient continued to have nausea and vomiting after 2 doses of Zofran.  She was admitted for further treatment.  Subsequently, patient was started on IV Rocephin in the setting of E. coli UTI.  Flagyl was added to cover possible colitis.  Patient was also started on IV vancomycin for Enterococcus faecalis wound infection.  General surgery consulted for possible debridement of patient's wound.     Assessment/Plan       Assessment     R foot wound infection, Enterococcus faecalis  Possible osteomyelitis of R foot  Hardware present in RLE  E. Coli UTI  Possible colitis         Plan      Patient awake and alert this morning.  Complains of right lower extremity pain.  Afebrile.  Lungs clear to auscultation bilaterally.  Abdomen soft, nontender.  Right AKA stump with no surrounding erythema or drainage.  WBC normal at 4.27.    CT of the abdomen pelvis from 8/17/2024 reports mild chronic renal cortical volume loss, significant volume of dense calcifications throughout the abdominal aortic impact pelvic aortic  branch segments consistent with atherosclerotic disease, small left pleural effusion and left lower lobe atelectasis, small volume of fluid in the abdomen and pelvis with peritoneal dialysis catheter noted to terminate in the lateral left abdomen, moderate stranding in the subcutaneous fat consistent with edema in the abdominal wall and pelvic wall extending into the proximal right left lower extremity, degenerative disc disease throughout the lumbar spine with endplate and facet hypertrophic changes.    Peritoneal body fluid culture shows no growth thus far.    Patient completed empiric courses of vancomycin and Zosyn.  Patient overall stable off of antibiotic therapy at this time.  Will continue to monitor off of antibiotic coverage for now.       ANTIMICROBIAL THERAPY    cefdinir - 300 MG     Code Status:   Code Status and Medical Interventions: No CPR (Do Not Attempt to Resuscitate); Limited Support; No intubation (DNI)   Ordered at: 08/04/24 1645     Medical Intervention Limits:    No intubation (DNI)     Code Status (Patient has no pulse and is not breathing):    No CPR (Do Not Attempt to Resuscitate)     Medical Interventions (Patient has pulse or is breathing):    Limited Support       DARISU Moreno  08/19/24  10:57 EDT

## 2024-08-19 NOTE — PLAN OF CARE
Goal Outcome Evaluation:  Plan of Care Reviewed With: patient        Progress: no change  Outcome Evaluation: Patient alert/oriented and resting in bed during shift. Patient is on room air. PRN hydralazine given for blood pressure of 185/87. No complaints or concerns noted at this time. Will continue with plan of care.

## 2024-08-19 NOTE — PLAN OF CARE
Goal Outcome Evaluation:  Plan of Care Reviewed With: patient           Outcome Evaluation: Patient rested in bed during shift. Patient has no complaints or concerns at this time. PRN pain medication given. Patient has no complaints or concerns at this time. will continue with plan of care.

## 2024-08-19 NOTE — PROGRESS NOTES
[facility]     Patient Identification:  Name:  Reny Elena  Age:  64 y.o.  Sex:  female  :  1960  MRN:  [id number]  Visit Number:  [id number]  Primary Care Provider:  Susan Stephens APRN    Length of stay:  16    Chief complaint: None    Subjective:    Patient seen and examined at bedside with no nursing staff present.  Patient states she is doing well today but does appear quite sleepy.  She fell back asleep during our conversation.  Per nursing staff, no new events overnight.  ----------------------------------------------------------------------------------------------------------------------  Current Hospital Meds:  atorvastatin, 80 mg, Oral, Nightly  cholecalciferol, 50,000 Units, Oral, Q7 Days  ferrous sulfate, 325 mg, Oral, Daily With Breakfast  FLUoxetine, 10 mg, Oral, Daily  folic acid, 1 mg, Oral, Daily  gentamicin, 1 Application, Topical, Daily  insulin glargine, 10 Units, Subcutaneous, Daily  insulin lispro, 2-9 Units, Subcutaneous, 4x Daily AC & at Bedtime  lacosamide, 50 mg, Oral, Q12H  levothyroxine, 100 mcg, Oral, QAM AC  magic barrier cream, , Topical, BID  metoprolol tartrate, 12.5 mg, Oral, Q12H  pantoprazole, 40 mg, Oral, QAM AC  potassium chloride, 40 mEq, Oral, Daily  sodium chloride, 10 mL, Intravenous, Q12H  sodium chloride, 10 mL, Intravenous, Q12H  sodium chloride, 10 mL, Intravenous, Q12H  sodium chloride, 10 mL, Intravenous, Q12H  sodium chloride, 10 mL, Intravenous, Q12H  traZODone, 25 mg, Oral, Nightly         ----------------------------------------------------------------------------------------------------------------------  Vital Signs:  Temp:  [97.6 °F (36.4 °C)-98.2 °F (36.8 °C)] 97.9 °F (36.6 °C)  Heart Rate:  [61-69] 68  Resp:  [16-18] 16  BP: (124-185)/(68-88) 149/69      08/15/24  0500 24  0600 24  0500   Weight: 47.1 kg (103 lb 13.4 oz) 48.1 kg (106 lb 0.7 oz) 45.1 kg (99 lb 6.4 oz) (Nik Piña)      Body mass index is 17.06 kg/m².    Intake/Output Summary (Last 24 hours) at 8/19/2024 1810  Last data filed at 8/19/2024 1700  Gross per 24 hour   Intake 1440 ml   Output 1500 ml   Net -60 ml     Diet: Regular/House; Feeding Assistance - Nursing; Texture: Pureed (NDD 1); Fluid Consistency: Thin (IDDSI 0)  ----------------------------------------------------------------------------------------------------------------------  Physical exam:  Constitutional: Chronically ill-appearing  female in no apparent distress.     HENT:  Head:  Normocephalic and atraumatic.  Mouth:  Moist mucous membranes.    Eyes:  Conjunctivae and EOM are normal.  Pupils are equal, round, and reactive to light.  No scleral icterus.    Neck:  Neck supple. No thyromegaly.  No JVD present.    Cardiovascular:  Regular rate and rhythm with no murmurs, rubs, clicks or gallops appreciated.  Pulmonary/Chest:  Clear to auscultation bilaterally with no crackles, wheezes or rhonchi appreciated.  Abdominal:  Soft. Nontender. Nondistended, peritoneal dialysis catheter in place.  Bowel sounds are normal in all four quadrants. No organomegally appreciated.   Musculoskeletal: Patient with diffuse anasarca, no tenderness, and no deformity.  No red or swollen joints anywhere.    Neurological:  Alert, Cranial nerves II-XII intact with no focal deficits.  No facial droop.  No slurred speech.   Skin:  Warm and dry to palpation with no rashes or lesions appreciated.  Peripheral vascular:  2+ radial and pedal pulses in bilateral upper and lower extremities.  Psychiatric:  Alert and oriented x3, demonstrates appropriate judgment and insight.  ------------------------------------------------------------------  ----------------------------------------------------------------------------------------------------------------------      Results from last 7 days   Lab Units 08/19/24  0153 08/18/24  1553 08/17/24  0029 08/15/24  1116 08/15/24  0901 08/14/24  2310  08/14/24  1521 08/14/24  0702 08/13/24  2329 08/13/24  2154 08/13/24  0616 08/13/24  0005 08/13/24  0004 08/12/24  1918   CRP mg/dL  --   --   --   --   --   --   --   --   --   --   --   --   --  1.65*   LACTATE mmol/L  --   --   --   --   --   --   --   --   --  1.5 2.5* 3.5*  --   --    WBC 10*3/mm3 4.27 4.33 3.80   < > 3.97 4.31  --  5.70   < >  --   --   --    < >  --    HEMOGLOBIN g/dL 12.1 11.4* 10.5*   < > 11.4* 6.3*  --  9.2*   < >  --   --   --    < >  --    HEMATOCRIT % 34.9 33.3* 29.7*   < > 33.6* 18.9*  --  26.8*   < >  --   --   --    < >  --    MCV fL 84.3 85.6 83.0   < > 85.1 93.1  --  92.7   < >  --   --   --    < >  --    MCHC g/dL 34.7 34.2 35.4   < > 33.9 33.3  --  34.3   < >  --   --   --    < >  --    PLATELETS 10*3/mm3 32* 36* 41*   < > 61* 79*  --  104*   < >  --   --   --    < >  --    INR   --   --   --   --  0.88* 1.13* 1.20* 1.46*  --  2.13*  --   --   --   --     < > = values in this interval not displayed.     Results from last 7 days   Lab Units 08/14/24  0503   PH, ARTERIAL pH units 7.411   PO2 ART mm Hg 72.0*   PCO2, ARTERIAL mm Hg 34.6*   HCO3 ART mmol/L 21.9     Results from last 7 days   Lab Units 08/19/24  1324 08/19/24  0153 08/18/24  1553 08/17/24  0708 08/16/24  0646 08/16/24  0028 08/15/24  0901   SODIUM mmol/L  --  138 137 134*   < > 137 137   POTASSIUM mmol/L 2.9* 2.7* 2.7* 3.4*   < > 3.3* 3.1*   MAGNESIUM mg/dL  --  2.0  --   --   --  2.2 1.3*   CHLORIDE mmol/L  --  102 102 101   < > 99 98   CO2 mmol/L  --  23.5 22.1 21.4*   < > 22.0 20.9*   BUN mg/dL  --  37* 34* 28*   < > 25* 23   CREATININE mg/dL  --  2.80* 2.83* 2.55*   < > 2.61* 2.54*   CALCIUM mg/dL  --  7.0* 6.8* 7.0*   < > 7.2* 7.4*   GLUCOSE mg/dL  --  188* 84 163*   < > 216* 163*   ALBUMIN g/dL  --  2.1*  --   --   --  2.5* 2.9*   BILIRUBIN mg/dL  --  0.5  --   --   --  0.6 1.2   ALK PHOS U/L  --  488*  --   --   --  496* 422*   AST (SGOT) U/L  --  77*  --   --   --  258* 400*   ALT (SGPT) U/L  --  26  --   --    "--  30 32    < > = values in this interval not displayed.   Estimated Creatinine Clearance: 14.1 mL/min (A) (by C-G formula based on SCr of 2.8 mg/dL (H)).    No results found for: \"AMMONIA\"      Blood Culture   Date Value Ref Range Status   08/12/2024 No growth at 5 days  Final     No results found for: \"URINECX\"  No results found for: \"WOUNDCX\"  No results found for: \"STOOLCX\"    I have personally looked at the labs and they are summarized above.  ----------------------------------------------------------------------------------------------------------------------  Imaging Results (Last 24 Hours)       ** No results found for the last 24 hours. **          ----------------------------------------------------------------------------------------------------------------------  Assessment and Plan:    Septic shock (present on admission, resolved) -likely multifactorial from right lower extremity/right ankle osteomyelitis in combination with volume shifts from ESRD and severe hypoalbuminemia from severe protein calorie malnutrition.    2.  Acute ischemic hepatitis -slowly improving, will repeat CMP in the morning    3.  ESRD on PD -appreciate nephrology recommendations, continue PD per their recommendations.    4.  Severe malnutrition -appreciate nutrition recommendations, will continue Novasource renal shakes on a regular basis.  Continue to encourage oral intake    5.  Right ankle osteomyelitis - s/p right BKA, currently doing well, patient has completed full course of antibiotic therapy with vancomycin and Zosyn.  Continue to monitor off antibiotic therapy.    6.  Chronic debility    7.  Type 2 diabetes mellitus -continue Accu-Cheks before every meal and nightly with sliding scale insulin    8.  Metabolic encephalopathy -resolved    9.  Acute blood loss anemia -hemoglobin now stable.  Continue to monitor closely and transfuse for hemoglobin less than 7    10.  Thrombocytopenia -HIT panel currently pending, no active " bleeding, no need for platelet transfusion at this time.  Will transfuse for platelet count less than 10,000 or if actively bleeding less than 50,000.    Disposition will likely require several more days hospitalization    Narayan Mayorga DO   08/19/24   18:10 EDT

## 2024-08-19 NOTE — CASE MANAGEMENT/SOCIAL WORK
Discharge Planning Assessment  [facility]     Patient Name: Reny Elena  MRN: [id number]  Today's Date: 8/19/2024    Admit Date: 8/2/2024       Discharge Plan       Row Name 08/19/24 1729       Plan    Plan Pt was admitted on 08/02/24. Pt transferred from CCU to [address] [place] [address]. SS spoke with Pt at bedside about discharge planning. Pt provided SS permission to speak with her HC surrogate/significant other Ej. Pt lives with her significant other and plans on returning home at discharge. Pt's HC surrogate is agreeable for Pt to return home at discharge. Pt utilizes [facility]  for nursing services and will require at new home health order at discharge. Pt's significant other states he is Pt's caregiver and assists with peritoneal dialysis. Pt's PLOF was bed bound status. Pt receives Peritoneal Dialysis supplies via [facility]. Pt received home caregivers through [employer] [phone] 9 hours a week. Pt has a wheelchair, bedside commode, glucometer, bp cuff, shower chair, rollator, cane, standard walker via [facility]. Pt requesting Hospital bed at discharge. Pt and HC surrogate have declined SNF placement. SS to follow up with [facility] Waiver service on 08/20/24 to determine if Pt may be able to receive more assistance in the home. SS to follow.                     CAITY Carroll

## 2024-08-19 NOTE — NURSING NOTE
Patient has developed some yeasty dermatitis to her anterior groin and jeremiah area.  Order placed for Magic Barrier Cream BID and leave open to air.

## 2024-08-19 NOTE — PROGRESS NOTES
"Palliative Care Daily Progress Note     S: Medical record reviewed, upon entering the room pt was lying in bed. Pt is able to verbalize that she is having pain to her \"leg that was cut\". She rates pain as \"very bad\". She also complains of increased nausea. She is able to verbalize no episodes of vomiting, no diarrhea or constipation.     O:   Palliative Performance Scale Score:     /70   Pulse 68   Temp 98.2 °F (36.8 °C) (Oral)   Resp 18   Ht 162.6 cm (64\")   Wt 45.1 kg (99 lb 6.4 oz) Comment: Nik Lozada Aware.  LMP 05/26/2005   SpO2 99%   BMI 17.06 kg/m²     Intake/Output Summary (Last 24 hours) at 8/19/2024 1154  Last data filed at 8/19/2024 0900  Gross per 24 hour   Intake 1210 ml   Output 1500 ml   Net -290 ml       PE:    General Appearance:    Chronically ill appearing, alert, cooperative, NAD   HEENT:    NC/AT, without obvious abnormality, EOMI, anicteric    Neck:   supple, trachea midline, no JVD   Lungs:     Bilaterally diminished breath sounds, bibasilar crackles noted     Heart:    RRR, normal S1 and S2, no M/R/G   Abdomen:     Soft, NT, ND, NABS , PD catheter in place, anasarca    Extremities:   Moves all extremities, pitting  edema BUE/BLE    Pulses:   Pulses palpable and equal bilaterally   Skin:   Warm, dry   Neurologic:   A/Ox3, cooperative   Psych:   Calm, appropriate         Meds: Reviewed and changes not noted    Labs:   Results from last 7 days   Lab Units 08/19/24  0153   WBC 10*3/mm3 4.27   HEMOGLOBIN g/dL 12.1   HEMATOCRIT % 34.9   PLATELETS 10*3/mm3 32*     Results from last 7 days   Lab Units 08/19/24  0153   SODIUM mmol/L 138   POTASSIUM mmol/L 2.7*   CHLORIDE mmol/L 102   CO2 mmol/L 23.5   BUN mg/dL 37*   CREATININE mg/dL 2.80*   GLUCOSE mg/dL 188*   CALCIUM mg/dL 7.0*     Results from last 7 days   Lab Units 08/19/24  0153   SODIUM mmol/L 138   POTASSIUM mmol/L 2.7*   CHLORIDE mmol/L 102   CO2 mmol/L 23.5   BUN mg/dL 37*   CREATININE mg/dL 2.80*   CALCIUM mg/dL 7.0* "   BILIRUBIN mg/dL 0.5   ALK PHOS U/L 488*   ALT (SGPT) U/L 26   AST (SGOT) U/L 77*   GLUCOSE mg/dL 188*     Imaging Results (Last 72 Hours)       Procedure Component Value Units Date/Time    CT Abdomen Pelvis Without Contrast [[id number]] Collected: 08/17/24 1709     Updated: 08/17/24 1716    Narrative:      PROCEDURE: CT of the abdomen and pelvis performed without IV contrast on  August 17, 2024. Examination was performed with 5 mm axial imaging and 5  mm sagittal and coronal reconstruction images. The examination was  performed according to as low as reasonably achievable dose protocol.     HISTORY: Pain. Vomiting.     COMPARISON: None.     FINDINGS:     Normal heart size with trace volume of pericardial fluid.  Small left pleural effusion with compressive atelectasis at the left  lung base.  Small volume of free fluid in the abdomen and pelvis.  No acute process seen in the liver, spleen, gallbladder, pancreas,  adrenal glands, and kidneys.  Mild chronic bilateral renal cortical volume loss.  Peritoneal dialysis catheter identified in the left lateral aspect of  the abdomen.  Moderate stranding in the subcutaneous fat consistent with edema.  No free air.  Dense calcifications identified throughout the aortic abdominal and  pelvic branch segments.  No acute process seen in the bladder.  No free air, abscess, or hematoma.  Mild edematous appearance to the large and small bowel due to peritoneal  fluid.       Impression:         1.  Mild chronic renal cortical volume loss  2.  Significant volume of dense calcifications throughout the abdominal  aortic and pelvic aortic branch segments consistent with atherosclerotic  disease.  3.  Small left pleural effusion with left lower lobe atelectasis.  4.  Small volume of fluid in the abdomen and pelvis with peritoneal  dialysis catheter noted to terminate in the lateral left abdomen.  5.  Moderate stranding in the subcutaneous fat consistent with edema in  the abdominal wall  and pelvic wall extending into the proximal right and  left lower extremity.  6.  Degenerative disc disease throughout the lumbar spine with endplate  and facet hypertrophic changes.  7.  No free air, abscess, or hematoma.     This report was finalized on 8/17/2024 5:13 PM by Carlos Andrews MD.                 Diagnostics: Reviewed    A: Reny Elena is resting comfortably however does report increased pain to extremities. Her nutritional status remains poor at this time. 8/19 labs - potassium  2.7, bun 37 creat 2.80, eGFR 18.1, calcium 7, alk phos 488, total protein 4.4, albumin 2.1, AST 77 , wbc 4.27 , platelets 32 bp 151/68 hr 69 rr 18 sat 99       P: Pt likely will require SNF placement for rehabilitation . No family present at time of examination. Will continue to provide symptomatic and supportive palliative care for patient and family. Will assist with disposition as needed.       We will continue to follow along. Please do not hesitate to contact us regarding further sx mgmt or GOC needs, including after hours or on weekends via our on call provider at [phone].     Sarah Chavez, APRN    8/19/2024

## 2024-08-19 NOTE — PROGRESS NOTES
"Nephrology Progress Note      Subjective   No chest pain, shortness of breath lying on bed comfortably bilateral upper extremity swelling is getting better  Objective       Vital signs :     Temp:  [97.6 °F (36.4 °C)-98.8 °F (37.1 °C)] 98.2 °F (36.8 °C)  Heart Rate:  [56-66] 63  Resp:  [12-20] 18  BP: (103-185)/(63-96) 168/77    Intake/Output                         08/17/24 0701 - 08/18/24 0700 08/18/24 0701 - 08/19/24 0700     5089-5507 9908-1175 Total 4900-3191 1481-5276 Total                 Intake    P.O.  799  -- 799  510  120 630    IV Piggyback  --  -- --  100  -- 100    Total Intake 799 -- 799 610 120 730       Output    Urine  0  -- 0  0  -- 0    Emesis/NG output  --  -- --  0  -- 0    Stool  --  -- --  0  -- 0    Dialysis  1607  2500 4107  --  1500 1500    Total Output 1607 2500 4107 0 1500 1500             Physical Exam:    General Appearance : Lying on bed comfortably  Lungs : Bilateral decreased intensity of breath sounds, bibasilar crackles  Heart :  regular rhythm & normal rate, normal S1, S2 and no murmur, no rub  Abdomen : Soft, nondistended PD catheter site clear  Extremities : Bilateral 2+ upper extremity edema  Neurologic :   Unable to assess      Laboratory Data :     Albumin Albumin   Date Value Ref Range Status   08/19/2024 2.1 (L) 3.5 - 5.2 g/dL Final        Magnesium Magnesium   Date Value Ref Range Status   08/19/2024 2.0 1.6 - 2.4 mg/dL Final            PTH               No results found for: \"PTH\"    CBC and coagulation:  Results from last 7 days   Lab Units 08/19/24  0153 08/18/24  1553 08/17/24  0029 08/15/24  1116 08/15/24  0901 08/14/24  2314 08/14/24  1521 08/13/24  2329 08/13/24  2154 08/13/24  0616 08/13/24  0005 08/13/24  0004 08/12/24  1918   PROCALCITONIN ng/mL  --   --   --   --   --   --   --   --   --   --   --   --  74.29*   LACTATE mmol/L  --   --   --   --   --   --   --   --  1.5 2.5* 3.5*  --   --    CRP mg/dL  --   --   --   --   --   --   --   --   --   --   --   --  " 1.65*   WBC 10*3/mm3 4.27 4.33 3.80   < > 3.97 4.31  --    < >  --   --   --    < >  --    HEMOGLOBIN g/dL 12.1 11.4* 10.5*   < > 11.4* 6.3*  --    < >  --   --   --    < >  --    HEMATOCRIT % 34.9 33.3* 29.7*   < > 33.6* 18.9*  --    < >  --   --   --    < >  --    MCV fL 84.3 85.6 83.0   < > 85.1 93.1  --    < >  --   --   --    < >  --    MCHC g/dL 34.7 34.2 35.4   < > 33.9 33.3  --    < >  --   --   --    < >  --    PLATELETS 10*3/mm3 32* 36* 41*   < > 61* 79*  --    < >  --   --   --    < >  --    INR   --   --   --   --  0.88* 1.13* 1.20*   < > 2.13*  --   --   --   --     < > = values in this interval not displayed.     Acid/base balance:  Results from last 7 days   Lab Units 08/14/24  0503 08/13/24  2217   PH, ARTERIAL pH units 7.411 7.399   PO2 ART mm Hg 72.0* 77.2*   PCO2, ARTERIAL mm Hg 34.6* 36.0   HCO3 ART mmol/L 21.9 22.2     Renal and electrolytes:    Results from last 7 days   Lab Units 08/19/24  0153 08/18/24  1554 08/18/24  1553 08/17/24  0708 08/17/24  0029 08/16/24  0646 08/16/24  0028 08/15/24  0901 08/14/24  2314 08/13/24  2154 08/13/24  0745   SODIUM mmol/L 138  --  137 134* 138 136 137 137  --    < >  --    POTASSIUM mmol/L 2.7*  --  2.7* 3.4* 2.3* 3.0* 3.3* 3.1*  --    < >  --    MAGNESIUM mg/dL 2.0  --   --   --   --   --  2.2 1.3*  --   --   --    CHLORIDE mmol/L 102  --  102 101 102 98 99 98  --    < >  --    CO2 mmol/L 23.5  --  22.1 21.4* 21.5* 20.1* 22.0 20.9*  --    < >  --    BUN mg/dL 37*  --  34* 28* 27* 24* 25* 23  --    < >  --    CREATININE mg/dL 2.80*  --  2.83* 2.55* 2.41* 2.60* 2.61* 2.54*  --    < >  --    CALCIUM mg/dL 7.0*  --  6.8* 7.0* 7.0* 7.2* 7.2* 7.4*  --    < >  --    IONIZED CALCIUM mmol/L  --  0.81*  --   --   --   --   --   --  0.95*  --  0.74*   PHOSPHORUS mg/dL  --   --   --   --   --   --  2.6 3.4  --   --   --     < > = values in this interval not displayed.     Estimated Creatinine Clearance: 14.1 mL/min (A) (by C-G formula based on SCr of 2.8 mg/dL  (H)).  @GFRCG:3@   Liver and pancreatic function:  Results from last 7 days   Lab Units 08/19/24  0153 08/16/24  0028 08/15/24  0901   ALBUMIN g/dL 2.1* 2.5* 2.9*   BILIRUBIN mg/dL 0.5 0.6 1.2   ALK PHOS U/L 488* 496* 422*   AST (SGOT) U/L 77* 258* 400*   ALT (SGPT) U/L 26 30 32         Cardiac:      Liver and pancreatic function:  Results from last 7 days   Lab Units 08/19/24  0153 08/16/24  0028 08/15/24  0901   ALBUMIN g/dL 2.1* 2.5* 2.9*   BILIRUBIN mg/dL 0.5 0.6 1.2   ALK PHOS U/L 488* 496* 422*   AST (SGOT) U/L 77* 258* 400*   ALT (SGPT) U/L 26 30 32       Medications :     atorvastatin, 80 mg, Oral, Nightly  cholecalciferol, 50,000 Units, Oral, Q7 Days  ferrous sulfate, 325 mg, Oral, Daily With Breakfast  FLUoxetine, 10 mg, Oral, Daily  folic acid, 1 mg, Oral, Daily  insulin glargine, 10 Units, Subcutaneous, Daily  insulin lispro, 2-9 Units, Subcutaneous, 4x Daily AC & at Bedtime  lacosamide, 50 mg, Oral, Q12H  levothyroxine, 100 mcg, Oral, QAM AC  metoprolol tartrate, 12.5 mg, Oral, Q12H  pantoprazole, 40 mg, Oral, QAM AC  potassium chloride, 20 mEq, Oral, Once  sodium chloride, 10 mL, Intravenous, Q12H  sodium chloride, 10 mL, Intravenous, Q12H  sodium chloride, 10 mL, Intravenous, Q12H  sodium chloride, 10 mL, Intravenous, Q12H  sodium chloride, 10 mL, Intravenous, Q12H  traZODone, 25 mg, Oral, Nightly                 Assessment & Plan       -End-stage renal disease on peritoneal dialysis  -Hypotension  -Anemia  -Hypokalemia  -Type 2 diabetes mellitus with renal involvement  - Persistent nausea and vomiting  - Cachexia and malnutrition    Patient had 1500 mL of net ultrafiltration with [facility] last night.  Will change dialysate to 1.5% / 2.5% max    Assistant hypokalemia, increase potassium replacement to 40 mEq every day    Metabolic acidosis, monitor    Poor nutritional status, will discuss in detail with RN emphasized on assisted feeding    Discussed with RN    Please avoid nephrotoxic medication and  pharmacy to adjust the medication according to the GFR.      Nicholas Arroyo MD  08/19/24  08:30 EDT

## 2024-08-19 NOTE — SIGNIFICANT NOTE
08/19/24 1314   OTHER   Discipline physical therapy assistant   Rehab Time/Intention   Session Not Performed patient/family declined, not feeling well

## 2024-08-20 ENCOUNTER — TELEPHONE (OUTPATIENT)
Dept: SURGERY | Facility: CLINIC | Age: 64
End: 2024-08-20

## 2024-08-20 LAB
ALBUMIN SERPL-MCNC: 1.9 G/DL (ref 3.5–5.2)
ALBUMIN/GLOB SERPL: 0.9 G/DL
ALP SERPL-CCNC: 372 U/L (ref 39–117)
ALT SERPL W P-5'-P-CCNC: 25 U/L (ref 1–33)
ANION GAP SERPL CALCULATED.3IONS-SCNC: 10.4 MMOL/L (ref 5–15)
ANISOCYTOSIS BLD QL: NORMAL
AST SERPL-CCNC: 53 U/L (ref 1–32)
BASOPHILS # BLD AUTO: 0 10*3/MM3 (ref 0–0.2)
BASOPHILS NFR BLD AUTO: 0 % (ref 0–1.5)
BILIRUB SERPL-MCNC: 0.4 MG/DL (ref 0–1.2)
BUN SERPL-MCNC: 40 MG/DL (ref 8–23)
BUN/CREAT SERPL: 14.3 (ref 7–25)
CALCIUM SPEC-SCNC: 6.9 MG/DL (ref 8.6–10.5)
CHLORIDE SERPL-SCNC: 101 MMOL/L (ref 98–107)
CO2 SERPL-SCNC: 24.6 MMOL/L (ref 22–29)
CREAT SERPL-MCNC: 2.8 MG/DL (ref 0.57–1)
DEPRECATED RDW RBC AUTO: 52.6 FL (ref 37–54)
EGFRCR SERPLBLD CKD-EPI 2021: 18.1 ML/MIN/1.73
EOSINOPHIL # BLD AUTO: 0.05 10*3/MM3 (ref 0–0.4)
EOSINOPHIL NFR BLD AUTO: 1.3 % (ref 0.3–6.2)
ERYTHROCYTE [DISTWIDTH] IN BLOOD BY AUTOMATED COUNT: 17.6 % (ref 12.3–15.4)
GLOBULIN UR ELPH-MCNC: 2.1 GM/DL
GLUCOSE BLDC GLUCOMTR-MCNC: 165 MG/DL (ref 70–130)
GLUCOSE BLDC GLUCOMTR-MCNC: 165 MG/DL (ref 70–130)
GLUCOSE BLDC GLUCOMTR-MCNC: 191 MG/DL (ref 70–130)
GLUCOSE BLDC GLUCOMTR-MCNC: 59 MG/DL (ref 70–130)
GLUCOSE BLDC GLUCOMTR-MCNC: 85 MG/DL (ref 70–130)
GLUCOSE SERPL-MCNC: 112 MG/DL (ref 65–99)
HCT VFR BLD AUTO: 31.8 % (ref 34–46.6)
HGB BLD-MCNC: 11.1 G/DL (ref 12–15.9)
IMM GRANULOCYTES # BLD AUTO: 0.02 10*3/MM3 (ref 0–0.05)
IMM GRANULOCYTES NFR BLD AUTO: 0.5 % (ref 0–0.5)
LYMPHOCYTES # BLD AUTO: 0.61 10*3/MM3 (ref 0.7–3.1)
LYMPHOCYTES NFR BLD AUTO: 15.7 % (ref 19.6–45.3)
MCH RBC QN AUTO: 29.6 PG (ref 26.6–33)
MCHC RBC AUTO-ENTMCNC: 34.9 G/DL (ref 31.5–35.7)
MCV RBC AUTO: 84.8 FL (ref 79–97)
MONOCYTES # BLD AUTO: 0.33 10*3/MM3 (ref 0.1–0.9)
MONOCYTES NFR BLD AUTO: 8.5 % (ref 5–12)
NEUTROPHILS NFR BLD AUTO: 2.88 10*3/MM3 (ref 1.7–7)
NEUTROPHILS NFR BLD AUTO: 74 % (ref 42.7–76)
NRBC BLD AUTO-RTO: 0 /100 WBC (ref 0–0.2)
PLATELET # BLD AUTO: 35 10*3/MM3 (ref 140–450)
PMV BLD AUTO: 10 FL (ref 6–12)
POTASSIUM SERPL-SCNC: 2.8 MMOL/L (ref 3.5–5.2)
PROT SERPL-MCNC: 4 G/DL (ref 6–8.5)
RBC # BLD AUTO: 3.75 10*6/MM3 (ref 3.77–5.28)
SMALL PLATELETS BLD QL SMEAR: NORMAL
SODIUM SERPL-SCNC: 136 MMOL/L (ref 136–145)
WBC NRBC COR # BLD AUTO: 3.89 10*3/MM3 (ref 3.4–10.8)

## 2024-08-20 PROCEDURE — 94799 UNLISTED PULMONARY SVC/PX: CPT

## 2024-08-20 PROCEDURE — 85025 COMPLETE CBC W/AUTO DIFF WBC: CPT | Performed by: FAMILY MEDICINE

## 2024-08-20 PROCEDURE — 82948 REAGENT STRIP/BLOOD GLUCOSE: CPT

## 2024-08-20 PROCEDURE — 63710000001 INSULIN LISPRO (HUMAN) PER 5 UNITS: Performed by: FAMILY MEDICINE

## 2024-08-20 PROCEDURE — 25010000002 HYDROMORPHONE PER 4 MG: Performed by: FAMILY MEDICINE

## 2024-08-20 PROCEDURE — 25010000002 POTASSIUM CHLORIDE 10 MEQ/100ML SOLUTION: Performed by: INTERNAL MEDICINE

## 2024-08-20 PROCEDURE — 63710000001 INSULIN GLARGINE PER 5 UNITS: Performed by: FAMILY MEDICINE

## 2024-08-20 PROCEDURE — 99232 SBSQ HOSP IP/OBS MODERATE 35: CPT | Performed by: INTERNAL MEDICINE

## 2024-08-20 PROCEDURE — 85007 BL SMEAR W/DIFF WBC COUNT: CPT | Performed by: FAMILY MEDICINE

## 2024-08-20 PROCEDURE — 80053 COMPREHEN METABOLIC PANEL: CPT | Performed by: INTERNAL MEDICINE

## 2024-08-20 PROCEDURE — 99233 SBSQ HOSP IP/OBS HIGH 50: CPT | Performed by: NURSE PRACTITIONER

## 2024-08-20 PROCEDURE — 97110 THERAPEUTIC EXERCISES: CPT

## 2024-08-20 PROCEDURE — 94761 N-INVAS EAR/PLS OXIMETRY MLT: CPT

## 2024-08-20 RX ORDER — POTASSIUM CHLORIDE 7.45 MG/ML
10 INJECTION INTRAVENOUS
Status: DISPENSED | OUTPATIENT
Start: 2024-08-20 | End: 2024-08-20

## 2024-08-20 RX ORDER — LANSOPRAZOLE 30 MG/1
30 TABLET, ORALLY DISINTEGRATING, DELAYED RELEASE ORAL
Status: DISCONTINUED | OUTPATIENT
Start: 2024-08-20 | End: 2024-08-21 | Stop reason: HOSPADM

## 2024-08-20 RX ORDER — POTASSIUM CHLORIDE 1.5 G/1.58G
40 POWDER, FOR SOLUTION ORAL DAILY
Status: DISCONTINUED | OUTPATIENT
Start: 2024-08-20 | End: 2024-08-21 | Stop reason: HOSPADM

## 2024-08-20 RX ADMIN — Medication 10 ML: at 09:43

## 2024-08-20 RX ADMIN — VITAMINS A AND D OINTMENT: 15.5; 53.4 OINTMENT TOPICAL at 09:44

## 2024-08-20 RX ADMIN — POTASSIUM CHLORIDE 10 MEQ: 7.46 INJECTION, SOLUTION INTRAVENOUS at 11:17

## 2024-08-20 RX ADMIN — INSULIN LISPRO 2 UNITS: 100 INJECTION, SOLUTION INTRAVENOUS; SUBCUTANEOUS at 20:25

## 2024-08-20 RX ADMIN — Medication 10 ML: at 20:26

## 2024-08-20 RX ADMIN — POTASSIUM CHLORIDE 10 MEQ: 7.46 INJECTION, SOLUTION INTRAVENOUS at 12:21

## 2024-08-20 RX ADMIN — ATORVASTATIN CALCIUM 80 MG: 40 TABLET, FILM COATED ORAL at 20:25

## 2024-08-20 RX ADMIN — INSULIN LISPRO 2 UNITS: 100 INJECTION, SOLUTION INTRAVENOUS; SUBCUTANEOUS at 11:17

## 2024-08-20 RX ADMIN — HYDROMORPHONE HYDROCHLORIDE 0.5 MG: 1 INJECTION, SOLUTION INTRAMUSCULAR; INTRAVENOUS; SUBCUTANEOUS at 11:41

## 2024-08-20 RX ADMIN — VITAMINS A AND D OINTMENT: 15.5; 53.4 OINTMENT TOPICAL at 20:30

## 2024-08-20 RX ADMIN — LACOSAMIDE 50 MG: 50 TABLET, FILM COATED ORAL at 20:25

## 2024-08-20 RX ADMIN — OXYCODONE HYDROCHLORIDE 5 MG: 5 TABLET ORAL at 20:25

## 2024-08-20 RX ADMIN — OXYCODONE HYDROCHLORIDE 5 MG: 5 TABLET ORAL at 13:39

## 2024-08-20 RX ADMIN — METOPROLOL TARTRATE 12.5 MG: 25 TABLET, FILM COATED ORAL at 20:25

## 2024-08-20 RX ADMIN — INSULIN LISPRO 2 UNITS: 100 INJECTION, SOLUTION INTRAVENOUS; SUBCUTANEOUS at 17:18

## 2024-08-20 RX ADMIN — TRAZODONE HYDROCHLORIDE 25 MG: 50 TABLET ORAL at 20:26

## 2024-08-20 RX ADMIN — INSULIN GLARGINE 10 UNITS: 100 INJECTION, SOLUTION SUBCUTANEOUS at 09:40

## 2024-08-20 NOTE — PLAN OF CARE
Goal Outcome Evaluation:  Plan of Care Reviewed With: patient        Progress: no change  Outcome Evaluation: Pt has rested in bed overnight. VSS. Wound care completed per orders. Pt IV's due to be changed per policy, pt educated but refused change. No acute changes. Will continue POC.

## 2024-08-20 NOTE — PROGRESS NOTES
PROGRESS NOTE         Patient Identification:  Name:  Reyn Elena  Age:  64 y.o.  Sex:  female  :  1960  MRN:  [id number]  Visit Number:  [id number]  Primary Care Provider:  Susan Stephens APRN         LOS: 17 days       ----------------------------------------------------------------------------------------------------------------------  Subjective       Chief Complaints:    Vomiting        Interval History:      Patient awake and alert this morning.  Requesting to see her significant other Cliff.  Currently on room air with no apparent distress.  No issues or complaints.  Afebrile, denies diarrhea.  Lung sounds diminished bilaterally.  Abdomen soft, nontender.  Afebrile, no reported diarrhea.  WBC 3.89.    Review of Systems:    EMMANUELLE due to confusion  ----------------------------------------------------------------------------------------------------------------------      Objective       Current Hospital Meds:  atorvastatin, 80 mg, Oral, Nightly  cholecalciferol, 50,000 Units, Oral, Q7 Days  ferrous sulfate, 325 mg, Oral, Daily With Breakfast  FLUoxetine, 10 mg, Oral, Daily  folic acid, 1 mg, Oral, Daily  gentamicin, 1 Application, Topical, Daily  insulin glargine, 10 Units, Subcutaneous, Daily  insulin lispro, 2-9 Units, Subcutaneous, 4x Daily AC & at Bedtime  lacosamide, 50 mg, Oral, Q12H  lansoprazole, 30 mg, Oral, Q AM  levothyroxine, 100 mcg, Oral, QAM AC  magic barrier cream, , Topical, BID  metoprolol tartrate, 12.5 mg, Oral, Q12H  potassium chloride, 40 mEq, Oral, Daily  potassium chloride, 10 mEq, Intravenous, Q1H  sodium chloride, 10 mL, Intravenous, Q12H  sodium chloride, 10 mL, Intravenous, Q12H  sodium chloride, 10 mL, Intravenous, Q12H  sodium chloride, 10 mL, Intravenous, Q12H  sodium chloride, 10 mL, Intravenous, Q12H  traZODone, 25 mg, Oral, Nightly              ----------------------------------------------------------------------------------------------------------------------    Vital Signs:  Temp:  [97.9 °F (36.6 °C)-98.7 °F (37.1 °C)] 98.7 °F (37.1 °C)  Heart Rate:  [63-76] 69  Resp:  [16-18] 16  BP: ()/(52-97) 109/97  No data found.      SpO2 Percentage    08/20/24 0300 08/20/24 0640 08/20/24 0700   SpO2: 97% 97% 97%     SpO2:  [97 %-99 %] 97 %  on   ;   Device (Oxygen Therapy): room air    Body mass index is 16.17 kg/m².  Wt Readings from Last 3 Encounters:   08/20/24 42.7 kg (94 lb 3.2 oz)   07/26/24 45.4 kg (100 lb)   07/26/24 51.7 kg (114 lb)        Intake/Output Summary (Last 24 hours) at 8/20/2024 0960  Last data filed at 8/20/2024 0654  Gross per 24 hour   Intake 1080 ml   Output 1276 ml   Net -196 ml     Diet: Regular/House; Feeding Assistance - Nursing; Texture: Pureed (NDD 1); Fluid Consistency: Thin (IDDSI 0)  ----------------------------------------------------------------------------------------------------------------------      Physical Exam:    Constitutional: Frail elderly female drowsy and sedate this morning.  Awake and alert this morning.  Complaints.  HENT:  Head: Normocephalic and atraumatic.  Mouth:  Moist mucous membranes.    Eyes:  Conjunctivae and EOM are normal.  No scleral icterus.  Neck:  Neck supple.  No JVD present.    Cardiovascular:  Normal rate, regular rhythm and normal heart sounds with no murmur.  Bilateral upper extreme edema, improving.  Pulmonary/Chest: Diminished to auscultation bilaterally no respiratory distress, no wheezes, no crackles, with normal breath sounds and good air movement.  Abdominal:  peritoneal dialysis catheter. Soft.  Bowel sounds are normal.  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  Neurological:  Alert and oriented to person, place, and time.  No facial droop.  No slurred speech.    Skin:  Right AKA stump is dressed, clean dry  and intact      ----------------------------------------------------------------------------------------------------------------------            Results from last 7 days   Lab Units 08/14/24  0503   PH, ARTERIAL pH units 7.411   PO2 ART mm Hg 72.0*   PCO2, ARTERIAL mm Hg 34.6*   HCO3 ART mmol/L 21.9     Results from last 7 days   Lab Units 08/20/24  0421 08/19/24  0153 08/18/24  1553 08/15/24  1116 08/15/24  0901 08/14/24  2314 08/14/24  1521 08/14/24  0702 08/13/24  2329 08/13/24  2154   LACTATE mmol/L  --   --   --   --   --   --   --   --   --  1.5   WBC 10*3/mm3 3.89 4.27 4.33   < > 3.97 4.31  --  5.70   < >  --    HEMOGLOBIN g/dL 11.1* 12.1 11.4*   < > 11.4* 6.3*  --  9.2*   < >  --    HEMATOCRIT % 31.8* 34.9 33.3*   < > 33.6* 18.9*  --  26.8*   < >  --    MCV fL 84.8 84.3 85.6   < > 85.1 93.1  --  92.7   < >  --    MCHC g/dL 34.9 34.7 34.2   < > 33.9 33.3  --  34.3   < >  --    PLATELETS 10*3/mm3 35* 32* 36*   < > 61* 79*  --  104*   < >  --    INR   --   --   --   --  0.88* 1.13* 1.20* 1.46*  --  2.13*    < > = values in this interval not displayed.     Results from last 7 days   Lab Units 08/20/24  0421 08/19/24  1324 08/19/24  0153 08/18/24  1553 08/16/24  0646 08/16/24  0028 08/15/24  0901   SODIUM mmol/L 136  --  138 137   < > 137 137   POTASSIUM mmol/L 2.8* 2.9* 2.7* 2.7*   < > 3.3* 3.1*   MAGNESIUM mg/dL  --   --  2.0  --   --  2.2 1.3*   CHLORIDE mmol/L 101  --  102 102   < > 99 98   CO2 mmol/L 24.6  --  23.5 22.1   < > 22.0 20.9*   BUN mg/dL 40*  --  37* 34*   < > 25* 23   CREATININE mg/dL 2.80*  --  2.80* 2.83*   < > 2.61* 2.54*   CALCIUM mg/dL 6.9*  --  7.0* 6.8*   < > 7.2* 7.4*   GLUCOSE mg/dL 112*  --  188* 84   < > 216* 163*   ALBUMIN g/dL 1.9*  --  2.1*  --   --  2.5* 2.9*   BILIRUBIN mg/dL 0.4  --  0.5  --   --  0.6 1.2   ALK PHOS U/L 372*  --  488*  --   --  496* 422*   AST (SGOT) U/L 53*  --  77*  --   --  258* 400*   ALT (SGPT) U/L 25  --  26  --   --  30 32    < > = values in this interval  "not displayed.   Estimated Creatinine Clearance: 13.3 mL/min (A) (by C-G formula based on SCr of 2.8 mg/dL (H)).  No results found for: \"AMMONIA\"      Glucose   Date/Time Value Ref Range Status   08/20/2024 0713 85 70 - 130 mg/dL Final   08/20/2024 0638 59 (L) 70 - 130 mg/dL Final   08/19/2024 2101 187 (H) 70 - 130 mg/dL Final   08/19/2024 1645 254 (H) 70 - 130 mg/dL Final   08/19/2024 1128 186 (H) 70 - 130 mg/dL Final   08/19/2024 0645 159 (H) 70 - 130 mg/dL Final   08/18/2024 2043 176 (H) 70 - 130 mg/dL Final   08/18/2024 1708 99 70 - 130 mg/dL Final     Lab Results   Component Value Date    HGBA1C 6.10 (H) 08/02/2024     Lab Results   Component Value Date    TSH 3.580 08/13/2024    FREET4 1.08 08/14/2024       Blood Culture   Date Value Ref Range Status   08/02/2024 No growth at 24 hours  Preliminary   08/02/2024 No growth at 24 hours  Preliminary     No results found for: \"URINECX\"  No results found for: \"WOUNDCX\"  No results found for: \"STOOLCX\"  No results found for: \"RESPCX\"  Pain Management Panel  More data exists         Latest Ref Rng & Units 7/21/2024 4/28/2024   Pain Management Panel   Amphetamine, Urine Qual Negative Negative  Negative    Barbiturates Screen, Urine Negative Negative  Negative    Benzodiazepine Screen, Urine Negative Negative  Negative    Buprenorphine, Screen, Urine Negative Negative  Negative    Cocaine Screen, Urine Negative Negative  Negative    Fentanyl, Urine Negative Negative  Negative    Methadone Screen , Urine Negative Negative  Negative    Methamphetamine, Ur Negative Negative  Negative       Details                     ----------------------------------------------------------------------------------------------------------------------  Imaging Results (Last 24 Hours)       ** No results found for the last 24 hours. **            ----------------------------------------------------------------------------------------------------------------------    Pertinent Infectious " Disease Results    Thank you Dr. Crawford for allowing us to participate in the care of your patient.  As you well know, Ms. Reny Elena is a 64 y.o. female with past medical history significant for seizure disorder, type 2 diabetes, arthritis, CHF, hypothyroidism, hypertension, hyperlipidemia, GERD, PAD, ESRD, iron deficiency anemia,, who presented to [facility] Emergency Department on 8/2/2024 for nausea, vomiting, diarrhea and worsening pain of right diabetic foot wound.  Patient was recently seen in our ED on 7/21/2024 with complaints of hallucinations, fever, and right foot pain.  At this time, patient was diagnosed with right heel ulcer and UTI.  She was sent home with an antibiotic but was unsure of the name.  Patient reported she started having nausea and vomiting after taking the antibiotics so she stopped taking them.  However, she continued to have nausea and vomiting.       Patient was tachycardic in the ED.Labs show K+ at upper limit of normal, BUN 30, cr 4.36, glucose 176, alk phos 323 and albumin 2.9, otherwise normal LFTs. CRP mildly elevated at 2.34, while lactate and WBC are normal. UA shows large leuk esterase and unable to determine RBC, WBC, bacteria of squamous cells due to loaded field.  Patient's urine culture from 7/21/2024 grew E. coli.  She had also been following with wound care who it obtained a wound culture on the same day.  This culture finalized with mixed gram-negative maurisio and light growth of Enterococcus faecalis.       X-ray of the right foot performed on 8/2/2024 revealed partial amputation of the fifth metatarsal. Bony demineralization. No acute fracture or dislocation. No lytic or blastic bony lesions seen. Diffuse interphalangeal joint space loss. Mild degenerative changes in the midfoot. No cortical erosion. Fixation timur in the tibia/talus/calcaneus noted. Diffuse soft tissue swelling. Vascular calcifications. No soft tissue gas.  CT abdomen/pelvis revealed  peritoneal dialysis catheter noted in the anterior right abdominal wall with the catheter noted to terminate in the left lateral pelvis. Small volume of free fluid in the right upper quadrant and lower posterior pelvis and right lower quadrant. Slightly elevated left hemidiaphragm. No bowel or renal obstruction. No abdominal aortic aneurysm. No features of acute appendicitis. Stranding identified in the presacral space could present changes related to mild distal colitis. Fluid-filled bladder with small volume of air in the bladder lumen. No pneumatosis. No conclusive features of free air. Distended configuration to the gallbladder likely due to fasting. No conclusive features of gastritis or enteritis.       Patient continued to have nausea and vomiting after 2 doses of Zofran.  She was admitted for further treatment.  Subsequently, patient was started on IV Rocephin in the setting of E. coli UTI.  Flagyl was added to cover possible colitis.  Patient was also started on IV vancomycin for Enterococcus faecalis wound infection.  General surgery consulted for possible debridement of patient's wound.     Assessment/Plan       Assessment     R foot wound infection, Enterococcus faecalis  Possible osteomyelitis of R foot  Hardware present in RLE  E. Coli UTI  Possible colitis         Plan      Patient awake and alert this morning.  Requesting to see her significant other Cliff.  Currently on room air with no apparent distress.  No issues or complaints.  Afebrile, denies diarrhea.  Lung sounds diminished bilaterally.  Abdomen soft, nontender.  Afebrile, no reported diarrhea.  WBC 3.89.    CT of the abdomen pelvis from 8/17/2024 reports mild chronic renal cortical volume loss, significant volume of dense calcifications throughout the abdominal aortic impact pelvic aortic branch segments consistent with atherosclerotic disease, small left pleural effusion and left lower lobe atelectasis, small volume of fluid in the abdomen  and pelvis with peritoneal dialysis catheter noted to terminate in the lateral left abdomen, moderate stranding in the subcutaneous fat consistent with edema in the abdominal wall and pelvic wall extending into the proximal right left lower extremity, degenerative disc disease throughout the lumbar spine with endplate and facet hypertrophic changes.    Peritoneal body fluid culture shows no growth thus far.    Patient completed empiric courses of vancomycin and Zosyn.  Patient has remained stable off of antibiotic therapy for several days.  Infectious disease will sign off for now.  Please contact us if we can further assist in this case.      ANTIMICROBIAL THERAPY    cefdinir - 300 MG     Code Status:   Code Status and Medical Interventions: No CPR (Do Not Attempt to Resuscitate); Limited Support; No intubation (DNI)   Ordered at: 08/04/24 1645     Medical Intervention Limits:    No intubation (DNI)     Code Status (Patient has no pulse and is not breathing):    No CPR (Do Not Attempt to Resuscitate)     Medical Interventions (Patient has pulse or is breathing):    Limited Support       DARIUS Moreno  08/20/24  09:51 EDT

## 2024-08-20 NOTE — PLAN OF CARE
Goal Outcome Evaluation:           Progress: no change  Outcome Evaluation: Pt resting in bed. No s/s of distress. VSS on RA. Pt had c/o pain, PRN medication given. Wound care completed per order. Pt voices no further complaints. Will continue with poc.

## 2024-08-20 NOTE — CASE MANAGEMENT/SOCIAL WORK
Discharge Planning Assessment   Jose Alfredo     Patient Name: Reny Elena  MRN: [id number]  Today's Date: 8/20/2024    Admit Date: 8/2/2024            Discharge Plan       Row Name 08/20/24 1120       Plan    Plan SS spoke with [facility] per Linda [phone] who confirms Pt receives 9 hours a week. Facility has requested updated clinicals be faxed to fax [phone] and Pt will be considered for an increase of up to 40 hours per week waiver service. Per Linda, this process takes several days, she will follow up with Pt at home when decision made about increasing caregivers in the home. SS to follow.                  ADRIAN CarrollW

## 2024-08-20 NOTE — PROGRESS NOTES
"Nephrology Progress Note      Subjective   Patient is feeling better, her intake has been improving gradually.  No chest pain or shortness of breath  Objective       Vital signs :     Temp:  [97.9 °F (36.6 °C)-98.7 °F (37.1 °C)] 98.7 °F (37.1 °C)  Heart Rate:  [63-76] 69  Resp:  [16-18] 16  BP: ()/(52-97) 109/97    Intake/Output                         08/18/24 0701 - 08/19/24 0700 08/19/24 0701 - 08/20/24 0700     [id number] 3289-0731 Total 9754-9581 6905-7988 Total                 Intake    P.O.  510  120 630  1320  240 1560    I.V.  --  -- --  --  0 0    IV Piggyback  100  -- 100  --  -- --    Total Intake 610  240 1560       Output    Urine  0  -- 0  --  -- --    Emesis/NG output  0  -- 0  --  -- --    Stool  0  -- 0  --  -- --    Dialysis  --  1500 1500  --  1276 1276    Total Output 0 1500 1500 -- 1276 1276             Physical Exam:    General Appearance : Lying on bed comfortably  Lungs : Bilateral decreased intensity of breath sounds, bibasilar crackles  Heart :  regular rhythm & normal rate, normal S1, S2 and no murmur, no rub  Abdomen : Soft, nondistended PD catheter site clear  Extremities : Bilateral 2+ upper extremity edema  Neurologic :   Unable to assess      Laboratory Data :     Albumin Albumin   Date Value Ref Range Status   08/20/2024 1.9 (L) 3.5 - 5.2 g/dL Final   08/19/2024 2.1 (L) 3.5 - 5.2 g/dL Final        Magnesium Magnesium   Date Value Ref Range Status   08/19/2024 2.0 1.6 - 2.4 mg/dL Final            PTH               No results found for: \"PTH\"    CBC and coagulation:  Results from last 7 days   Lab Units 08/20/24  0421 08/19/24  0153 08/18/24  1553 08/15/24  1116 08/15/24  0901 08/14/24  2314 08/14/24  1521 08/13/24  2329 08/13/24  2154   LACTATE mmol/L  --   --   --   --   --   --   --   --  1.5   WBC 10*3/mm3 3.89 4.27 4.33   < > 3.97 4.31  --    < >  --    HEMOGLOBIN g/dL 11.1* 12.1 11.4*   < > 11.4* 6.3*  --    < >  --    HEMATOCRIT % 31.8* 34.9 33.3*   < > 33.6* " 18.9*  --    < >  --    MCV fL 84.8 84.3 85.6   < > 85.1 93.1  --    < >  --    MCHC g/dL 34.9 34.7 34.2   < > 33.9 33.3  --    < >  --    PLATELETS 10*3/mm3 35* 32* 36*   < > 61* 79*  --    < >  --    INR   --   --   --   --  0.88* 1.13* 1.20*   < > 2.13*    < > = values in this interval not displayed.     Acid/base balance:  Results from last 7 days   Lab Units 08/14/24  0503 08/13/24  2217   PH, ARTERIAL pH units 7.411 7.399   PO2 ART mm Hg 72.0* 77.2*   PCO2, ARTERIAL mm Hg 34.6* 36.0   HCO3 ART mmol/L 21.9 22.2     Renal and electrolytes:    Results from last 7 days   Lab Units 08/20/24  0421 08/19/24  1324 08/19/24  0153 08/18/24  1554 08/18/24  1553 08/17/24  0708 08/17/24  0029 08/16/24  0646 08/16/24  0028 08/15/24  0901 08/14/24  2314   SODIUM mmol/L 136  --  138  --  137 134* 138   < > 137 137  --    POTASSIUM mmol/L 2.8* 2.9* 2.7*  --  2.7* 3.4* 2.3*   < > 3.3* 3.1*  --    MAGNESIUM mg/dL  --   --  2.0  --   --   --   --   --  2.2 1.3*  --    CHLORIDE mmol/L 101  --  102  --  102 101 102   < > 99 98  --    CO2 mmol/L 24.6  --  23.5  --  22.1 21.4* 21.5*   < > 22.0 20.9*  --    BUN mg/dL 40*  --  37*  --  34* 28* 27*   < > 25* 23  --    CREATININE mg/dL 2.80*  --  2.80*  --  2.83* 2.55* 2.41*   < > 2.61* 2.54*  --    CALCIUM mg/dL 6.9*  --  7.0*  --  6.8* 7.0* 7.0*   < > 7.2* 7.4*  --    IONIZED CALCIUM mmol/L  --   --   --  0.81*  --   --   --   --   --   --  0.95*   PHOSPHORUS mg/dL  --   --   --   --   --   --   --   --  2.6 3.4  --     < > = values in this interval not displayed.     Estimated Creatinine Clearance: 13.3 mL/min (A) (by C-G formula based on SCr of 2.8 mg/dL (H)).  @GFRCG:3@   Liver and pancreatic function:  Results from last 7 days   Lab Units 08/20/24  0421 08/19/24  0153 08/16/24  0028   ALBUMIN g/dL 1.9* 2.1* 2.5*   BILIRUBIN mg/dL 0.4 0.5 0.6   ALK PHOS U/L 372* 488* 496*   AST (SGOT) U/L 53* 77* 258*   ALT (SGPT) U/L 25 26 30         Cardiac:      Liver and pancreatic  function:  Results from last 7 days   Lab Units 08/20/24  0421 08/19/24  0153 08/16/24  0028   ALBUMIN g/dL 1.9* 2.1* 2.5*   BILIRUBIN mg/dL 0.4 0.5 0.6   ALK PHOS U/L 372* 488* 496*   AST (SGOT) U/L 53* 77* 258*   ALT (SGPT) U/L 25 26 30       Medications :     atorvastatin, 80 mg, Oral, Nightly  cholecalciferol, 50,000 Units, Oral, Q7 Days  ferrous sulfate, 325 mg, Oral, Daily With Breakfast  FLUoxetine, 10 mg, Oral, Daily  folic acid, 1 mg, Oral, Daily  gentamicin, 1 Application, Topical, Daily  insulin glargine, 10 Units, Subcutaneous, Daily  insulin lispro, 2-9 Units, Subcutaneous, 4x Daily AC & at Bedtime  lacosamide, 50 mg, Oral, Q12H  lansoprazole, 30 mg, Oral, Q AM  levothyroxine, 100 mcg, Oral, QAM AC  magic barrier cream, , Topical, BID  metoprolol tartrate, 12.5 mg, Oral, Q12H  potassium chloride, 40 mEq, Oral, Daily  sodium chloride, 10 mL, Intravenous, Q12H  sodium chloride, 10 mL, Intravenous, Q12H  sodium chloride, 10 mL, Intravenous, Q12H  sodium chloride, 10 mL, Intravenous, Q12H  sodium chloride, 10 mL, Intravenous, Q12H  traZODone, 25 mg, Oral, Nightly                 Assessment & Plan       -End-stage renal disease on peritoneal dialysis  -Hypotension  -Anemia  -Hypokalemia  -Type 2 diabetes mellitus with renal involvement  - Persistent nausea and vomiting  - Cachexia and malnutrition    Continue 1.5% / 2.5% mixed dialysate with CCPD    Acute severe hypokalemia, 30 mEq IV potassium and continue on 40 mEq daily orally  Metabolic acidosis, monitor    Poor nutritional status, will discuss in detail with RN emphasized on assisted feeding    Discussed with RN    Please avoid nephrotoxic medication and pharmacy to adjust the medication according to the GFR.      Nicholas Arroyo MD  08/20/24  09:20 EDT

## 2024-08-20 NOTE — DISCHARGE PLACEMENT REQUEST
"Reny Merino (64 y.o. Female)       Date of Birth   1960    Social Security Number       Address   [address] [address]    Home Phone   [phone]    MRN   [id number]       Zoroastrianism   Sikhism    Marital Status                               Admission Date   8/2/24    Admission Type   Emergency    Admitting Provider   Yandel Ortega DO    Attending Provider   Narayan Mayorga DO    Department, Room/Bed   [address], 3344/2S       Discharge Date       Discharge Disposition       Discharge Destination                                 Attending Provider: Narayan Mayorga DO    Allergies: Morphine, Penicillins, Codeine, Sulfa Antibiotics    Isolation: None   Infection: None   Code Status: No CPR    Ht: 162.6 cm (64\")   Wt: 42.7 kg (94 lb 3.2 oz)    Admission Cmt: None   Principal Problem: Diabetic ulcer of right heel [E11.621,L97.419]                   Active Insurance as of 8/2/2024       Primary Coverage       Payor Plan Insurance Group Employer/Plan Group    MEDICARE MEDICARE B ONLY        Payor Plan Address Payor Plan Phone Number Payor Plan Fax Number Effective Dates    [address] [address] [phone]  3/1/2023 - None Entered    [facility] [id number]         Subscriber Name Subscriber Birth Date Member ID       RENY MERINO 1960 [id number]               Secondary Coverage       Payor Plan Insurance Group Employer/Plan Group    [place]        Payor Plan Address Payor Plan Phone Number Payor Plan Fax Number Effective Dates    [address] [phone]  11/7/2022 - None Entered    [facility] [id number]         Subscriber Name Subscriber Birth Date Member ID       RENY MERINO 1960 [id number]                     Emergency Contacts        (Rel.) Home Phone Work Phone Mobile Phone    Abbie ([facility]Cliff (Significant Other) [phone] -- [phone]    Liza Oliva (Daughter) [phone] " -- [phone]                 History & Physical        Erwin Tatum MD at 24 0316          Hospitalist History and Physical        Patient Identification  Name: eRny Elena  Age/Sex: 64 y.o. female  :  1960        MRN: [id number]  Visit Number: [id number]  Admit Date: 2024   PCP: Susan Stephens APRN          Chief complaint nausea, vomiting    History of Present Illness:  Patient is a 64 y.o. female with history of arthritis, insulin dependent type II DM, chronic diastolic CHF, hypothyroidism, seizure disorder, HTN, HLD, GERD, iron deficiency anemia, PAD, and ESRD on PD, who presents with complaints of nausea and vomiting over the last week. Of note, on 24 she presented to our ED complaining of hallucinations as well as right foot pain and fever. She was noted to have a right heel ulcer and a UTI based on abnormal UA. At that time she admits she was having some dysuria as well. She was sent home with a prescription for an antibiotic (unclear what exactly) and referred to wound care with whom she followed up today. She reports after taking the unknown antibiotic for a few days, she developed nausea and vomiting which has persisted despite stopping the antibiotic when symptoms first started. She denies abdominal pain. She denies any further dysuria. She does report increased pain in the right heel, however. In the ED, patient was tachycardic but other vitals were stable. Labs show K+ at upper limit of normal, BUN 30, cr 4.36, glucose 176, alk phos 323 and albumin 2.9, otherwise normal LFTs. CRP mildly elevated at 2.34, while lactate and WBC are normal. UA shows large leuk esterase and unable to determine RBC, WBC, bacteria of squamous cells due to loaded field. Order for urine to be sent for culture is pending, but urine culture from 24 grew E coli. Wound culture from that same visit grew moderate mixed gram negative maurisio along with light growth enterococcus faecalis.  XR right foot shows diffuse soft tissue swelling. CT abd/pelvis showes possible mild distal colitis. Patient Responded well to zofran earlier during her ED stay, but now is vomiting again. She is receiving a 2nd dose of zofran from the ED provider now and being admitted for further management.     Review of Systems  Review of Systems   Constitutional:  Positive for appetite change. Negative for chills and fever.   HENT:  Negative for postnasal drip, rhinorrhea, sinus pressure and sinus pain.    Respiratory:  Negative for cough, shortness of breath and wheezing.    Cardiovascular:  Negative for chest pain, palpitations and leg swelling.   Gastrointestinal:  Positive for nausea and vomiting. Negative for abdominal distention, abdominal pain, constipation and diarrhea.   Endocrine: Negative for polyphagia and polyuria.   Genitourinary:  Positive for dysuria. Negative for difficulty urinating, flank pain, frequency and hematuria.   Musculoskeletal:         Right heel pain   Skin:  Positive for wound (ulceration right heel).   Neurological:  Positive for weakness (generalized) and numbness. Negative for dizziness, tremors, seizures, syncope, light-headedness and headaches.   Hematological:  Negative for adenopathy. Does not bruise/bleed easily.   Psychiatric/Behavioral:  Negative for agitation, behavioral problems and confusion.        History  Past Medical History:   Diagnosis Date    Arthritis     Closed fracture of right fibula and tibia 12/25/2018    Depression     Diabetic ulcer of left foot associated with type 2 diabetes mellitus 6/23/2017    Diastolic dysfunction     Disease of thyroid gland     Elevated cholesterol     End stage renal disease     Essential hypertension     GERD (gastroesophageal reflux disease)     History of transfusion     Hyperlipidemia     Iron deficiency anemia 12/30/2018    PAD (peripheral artery disease)     Renal failure     Type 2 diabetes mellitus with hyperglycemia, with long-term  current use of insulin 2018     Past Surgical History:   Procedure Laterality Date    ABDOMINAL SURGERY      BACK SURGERY      Post spinal diskectomy, osteophytectomy in one lumbar interspace    CATARACT EXTRACTION      Left      SECTION      DENTAL PROCEDURE      ENDOSCOPY      FRACTURE SURGERY      right leg    HYSTERECTOMY      INCISION AND DRAINAGE LEG Left 4/15/2017    Procedure: INCISION AND DRAINAGE LOWER EXTREMITY;  Surgeon: Basilio Morris MD;  Location: [facility] OR;  Service:     INCISION AND DRAINAGE LEG Left 2017    Procedure: Irrigation and Debridment abcess diabetic wound left foot with deep culture;  Surgeon: Basilio Morris MD;  Location: [facility] OR;  Service:     INSERTION PERITONEAL DIALYSIS CATHETER N/A 2021    Procedure: INSERTION PERITONEAL DIALYSIS CATHETER LAPAROSCOPIC;  Surgeon: Edy Glover MD;  Location: [facility] OR;  Service: General;  Laterality: N/A;    KNEE ARTHROSCOPY Right     ORIF TIBIA FRACTURE Right 2018    Procedure: TIBIAL  FRACTURE OPEN REDUCTION INTERNAL FIXATION;  Surgeon: Jung Barragan MD;  Location: [facility] OR;  Service: Orthopedics    TOE AMPUTATION Right     5th    TUBAL ABDOMINAL LIGATION       Family History   Problem Relation Age of Onset    Heart disease Mother     Cancer Mother     COPD Mother     Diabetes Other     Depression Other      Social History     Tobacco Use    Smoking status: Never     Passive exposure: Never    Smokeless tobacco: Never   Vaping Use    Vaping status: Never Used   Substance Use Topics    Alcohol use: No    Drug use: No     (Not in a hospital admission)    Allergies:  Penicillins, Codeine, and Sulfa antibiotics    Objective    Vital Signs  Temp:  [97.5 °F (36.4 °C)] 97.5 °F (36.4 °C)  Heart Rate:  [] 92  Resp:  [14] 14  BP: (108-154)/(55-89) 135/78  Body mass index is 16.64 kg/m².    Physical Exam:  Physical Exam  Constitutional:       Comments: Frail appearing 65 yo female, looks older than stated age,  acutely and chronically ill. No acute distress.    HENT:      Head: Normocephalic and atraumatic.      Right Ear: External ear normal.      Left Ear: External ear normal.      Nose: Nose normal.      Mouth/Throat:      Mouth: Mucous membranes are dry.      Pharynx: Oropharynx is clear.   Eyes:      Extraocular Movements: Extraocular movements intact.      Conjunctiva/sclera: Conjunctivae normal.      Pupils: Pupils are equal, round, and reactive to light.   Cardiovascular:      Rate and Rhythm: Regular rhythm. Tachycardia present.      Pulses: Normal pulses.      Heart sounds: Normal heart sounds. No murmur heard.  Pulmonary:      Effort: Pulmonary effort is normal. No respiratory distress.      Breath sounds: Normal breath sounds. No wheezing or rales.   Abdominal:      General: Abdomen is flat. Bowel sounds are normal. There is no distension.      Palpations: Abdomen is soft.      Tenderness: There is no abdominal tenderness.      Comments: PD cathter noted in upper abdomen; insertion site appears dry, clean, intact. Diasylate fluid does not appear cloudy or bloody.    Musculoskeletal:         General: Normal range of motion.      Cervical back: Normal range of motion and neck supple. No tenderness.      Right lower leg: No edema.      Left lower leg: No edema.      Comments: Faint right hand edema compared to left.    Lymphadenopathy:      Cervical: No cervical adenopathy.   Skin:     General: Skin is warm and dry.      Findings: Lesion (right heel, with some surrounding eschar as well as slough. Erythema of surrounding skin noted. Very foul smelling.) present.   Neurological:      General: No focal deficit present.      Mental Status: She is oriented to person, place, and time.   Psychiatric:         Mood and Affect: Mood normal.         Behavior: Behavior normal.           Results Review:       Lab Results:  Results from last 7 days   Lab Units 08/02/24 1916   WBC 10*3/mm3 9.44   HEMOGLOBIN g/dL 13.0  "  PLATELETS 10*3/mm3 251     Results from last 7 days   Lab Units 08/02/24 1916   CRP mg/dL 2.34*     Results from last 7 days   Lab Units 08/02/24 1916   SODIUM mmol/L 134*   POTASSIUM mmol/L 5.1   CHLORIDE mmol/L 94*   CO2 mmol/L 24.6   BUN mg/dL 30*   CREATININE mg/dL 4.36*   CALCIUM mg/dL 7.6*   GLUCOSE mg/dL 176*         No results found for: \"HGBA1C\"  Results from last 7 days   Lab Units 08/02/24 1916   BILIRUBIN mg/dL 0.4   ALK PHOS U/L 323*   AST (SGOT) U/L 18   ALT (SGPT) U/L 11                       I have reviewed the patient's laboratory results.    Imaging:  Imaging Results (Last 72 Hours)       Procedure Component Value Units Date/Time    CT Abdomen Pelvis Without Contrast [[id number]] Collected: 08/03/24 0010     Updated: 08/03/24 0021    Narrative:      PROCEDURE: CT of the abdomen and pelvis performed without IV contrast on  August 2, 2024. The examination was performed with 5 mm axial imaging  and 5 mm sagittal and coronal reconstruction images. Total . The  examination was performed according to as low as reasonably achievable  dose protocol.     HISTORY: Vomiting. Peritoneal dialysis.     COMPARISON: None.     FINDINGS:     Moderate degenerative disc disease at the L4-5 and L5-S1 levels  No acute or chronic compression fracture deformity or scoliosis  Mild osteoarthritis at the right and left hip joint.  Normal heart size with no pericardial effusion.  Small bands of scar versus atelectasis in the right middle lobe and  lower lingula.  Small volume of free fluid in the right upper quadrant anterior and  lateral to the right hepatic lobe.  Small volume of free fluid in the lower posterior pelvis.  Mild distended gallbladder likely due to fasting.  Mild chronic bilateral renal cortical volume loss  Fluid-filled bladder with small volume of air in the bladder lumen.  Multiple lower pelvic phleboliths.  No bowel or renal obstruction.  Significant and diffuse aortic and aortic branch segment " calcified  plaque in the abdomen and pelvis.  No abdominal aortic aneurysm or retroperitoneal hemorrhage.  Small focus of air identified in the right hemidiaphragm and lower  anterior right chest fat of nonspecific origin. This is seen on image  50, series 4.  Mild stranding in the presacral space possibly due to mild distal  colitis.       Impression:         1.  Peritoneal dialysis catheter noted in the anterior right abdominal  wall with the catheter noted to terminate in the left lateral pelvis.  2.  Small volume of free fluid in the right upper quadrant and lower  posterior pelvis and right lower quadrant.  3.  Slightly elevated left hemidiaphragm.  4.  No bowel or renal obstruction.  5.  No abdominal aortic aneurysm.  6.  No features of acute appendicitis.  7.  Stranding identified in the presacral space could present changes  related to mild distal colitis.  8.  Fluid-filled bladder with small volume of air in the bladder lumen.  9.  No pneumatosis.  10.  No conclusive features of free air.  11.  Distended configuration to the gallbladder likely due to fasting.  No conclusive features of gastritis or enteritis.        This report was finalized on 8/3/2024 12:19 AM by Carlos Andrews MD.       XR Foot 2 View Right [[id number]] Collected: 08/02/24 1953     Updated: 08/02/24 2011    Narrative:      INDICATION: Foot pain.     TECHNIQUE: 2 views of the right foot.     COMPARISON: No relevant priors.       Impression:      FINDINGS/IMPRESSION:    Partial amputation of the fifth metatarsal. Bony demineralization. No  acute fracture or dislocation. No lytic or blastic bony lesions seen.  Diffuse interphalangeal joint space loss. Mild degenerative changes in  the midfoot. No cortical erosion. Fixation timur in the  tibia/talus/calcaneus noted.  Diffuse soft tissue swelling. Vascular calcifications. No soft tissue  gas.        This report was finalized on 8/2/2024 7:54 PM by Alex Pallas, DO.               I have personally  reviewed the patient's radiologic imaging.        EKG: ordered, results pending        Assessment & Plan    - Nausea, vomiting, suspect secondary to partially treated UTI, possible side effects of antibiotic recently started, possible colitis though no abdominal pain or tenderness, and infected diabetic right heel ulcer. Treat heel ulcer with IV vancomycin based on wound culture from 7/21 that grew enteroccus faecalis. Treat UTI with IV rocephin based on recent urine culture that grew E coli. Flagyl on board to cover possible colitis. Continue to trend WBC, CRP. Follow up repeat urine and blood cultures. Consult general surgery to evaluate right heel ulcer for debridement. Keep NPO for now since still nauseated and vomiting anyhow and because surgery may wish to proceed with debridement in the morning.   - ESRD on PD: consult nephrology to set up PD while in-house.  - Type II DM, insulin dependent: check A1c. Monitor accuchecks. Cover with SSI.     DVT Prophylaxis: SQ heparin    Estimated Length of Stay >2 midnights    I discussed the patient's findings, assessment and plan with the patient and ED provider Dr Mckeon.    Erwin Tatum MD  08/03/24  03:16 EDT      Electronically signed by Erwin Tatum MD at 08/03/24 0340       Current Facility-Administered Medications   Medication Dose Route Frequency Provider Last Rate Last Admin    atorvastatin (LIPITOR) tablet 80 mg  80 mg Oral Nightly Yandel Ortega DO   80 mg at 08/19/24 2054    sennosides-docusate (PERICOLACE) 8.6-50 MG per tablet 2 tablet  2 tablet Oral BID PRN Yandel Ortega DO        And    polyethylene glycol (MIRALAX) packet 17 g  17 g Oral Daily PRN Yandel Ortega DO        And    bisacodyl (DULCOLAX) EC tablet 5 mg  5 mg Oral Daily PRN Yandel Ortega DO        And    bisacodyl (DULCOLAX) suppository 10 mg  10 mg Rectal Daily PRN Yandel Ortega DO        Calcium Replacement - Follow Nurse / BPA Driven Protocol   Does not apply PRN  Yandel Ortega DO        cholecalciferol (VITAMIN D3) capsule 50,000 Units  50,000 Units Oral Q7 Days Yandel Ortega DO   50,000 Units at 08/16/24 0832    dextrose (D50W) (25 g/50 mL) IV injection 25 g  25 g Intravenous Q15 Min PRN Yandel Ortega DO   25 g at 08/12/24 1740    dextrose (D50W) (25 g/50 mL) IV injection 25 g  25 g Intravenous Q15 Min PRN Yandel Ortega DO        dextrose (GLUTOSE) oral gel 15 g  15 g Oral Q15 Min PRN Yandel Ortega DO        ferrous sulfate tablet 325 mg  325 mg Oral Daily With Breakfast Yandel Ortega DO   325 mg at 08/19/24 0930    FLUoxetine (PROzac) capsule 10 mg  10 mg Oral Daily Yandel Ortega DO   10 mg at 08/19/24 0931    fluticasone (FLONASE) 50 MCG/ACT nasal spray 2 spray  2 spray Nasal Daily PRN Yandel Ortega DO        folic acid (FOLVITE) tablet 1 mg  1 mg Oral Daily Yandel Ortega DO   1 mg at 08/18/24 0810    gentamicin (GARAMYCIN) 0.1 % ointment 1 Application  1 Application Topical Daily Nicholas Arroyo MD        glucagon HCl (Diagnostic) injection 1 mg  1 mg Intramuscular Q15 Min PRN Yandel Ortega DO        hydrALAZINE (APRESOLINE) injection 10 mg  10 mg Intravenous Q6H PRN Yandel Ortega DO   10 mg at 08/19/24 0236    HYDROmorphone (DILAUDID) injection 0.5 mg  0.5 mg Intravenous Q4H PRN Yandel Ortega DO   0.5 mg at 08/18/24 1159    insulin glargine (LANTUS, SEMGLEE) injection 10 Units  10 Units Subcutaneous Daily Yandel Ortega DO   10 Units at 08/20/24 0940    Insulin Lispro (humaLOG) injection 2-9 Units  2-9 Units Subcutaneous 4x Daily AC & at Bedtime Yandel Ortega DO   2 Units at 08/19/24 2110    ipratropium (ATROVENT) nebulizer solution 0.5 mg  0.5 mg Nebulization Q6H PRN Yandel Ortega DO        labetalol (NORMODYNE,TRANDATE) injection 10 mg  10 mg Intravenous Q4H PRN Yandel Ortega DO   10 mg at 08/16/24 0257    lacosamide (VIMPAT) tablet 50 mg  50 mg Oral Q12H Yandel Ortega DO   50 mg at 08/19/24 2054    lansoprazole (PREVACID SOLUTAB)  disintegrating tablet Tablet Delayed Release Dispersible 30 mg  30 mg Oral Q AM Megan Oliva PA-C        levothyroxine (SYNTHROID, LEVOTHROID) tablet 100 mcg  100 mcg Oral QAM AC Yandel Ortega DO   100 mcg at 08/19/24 0931    Lidocaine 4 % 1 patch  1 patch Transdermal Daily PRN Yandel Ortega DO        magic barrier cream   Topical BID Narayan Mayorga, DO   Given at 08/20/24 0944    Magnesium Standard Dose Replacement - Follow Nurse / BPA Driven Protocol   Does not apply PRN Yandel Ortega DO        metoprolol tartrate (LOPRESSOR) tablet 12.5 mg  12.5 mg Oral Q12H Yandel Ortega DO   12.5 mg at 08/19/24 2054    nitroglycerin (NITROSTAT) SL tablet 0.4 mg  0.4 mg Sublingual Q5 Min PRN Yandel Ortega DO        ondansetron (ZOFRAN) injection 4 mg  4 mg Intravenous Q6H PRN Yandel Ortega DO   4 mg at 08/17/24 2059    oxyCODONE (ROXICODONE) immediate release tablet 5 mg  5 mg Oral Q4H PRN Yandel Ortega DO   5 mg at 08/19/24 1157    Phosphorus Replacement - Follow Nurse / BPA Driven Protocol   Does not apply PRN Yandel Ortega DO        potassium chloride (KLOR-CON) packet 40 mEq  40 mEq Oral Daily Megan Oliva PA-C        potassium chloride 10 mEq in 100 mL IVPB  10 mEq Intravenous Q1H Nicholas Arroyo MD        Potassium Replacement - Follow Nurse / BPA Driven Protocol   Does not apply PRN Yandel Ortega DO        sodium chloride 0.9 % flush 10 mL  10 mL Intravenous PRN Yandel Ortega DO        sodium chloride 0.9 % flush 10 mL  10 mL Intravenous Q12H Yandel Ortega DO   10 mL at 08/20/24 0943    sodium chloride 0.9 % flush 10 mL  10 mL Intravenous PRN Yandel Ortega DO        sodium chloride 0.9 % flush 10 mL  10 mL Intravenous Q12H Yandel Ortega DO   10 mL at 08/20/24 0943    sodium chloride 0.9 % flush 10 mL  10 mL Intravenous Q12H Yandel Ortega DO   10 mL at 08/20/24 0943    sodium chloride 0.9 % flush 10 mL  10 mL Intravenous Q12H Yandel Ortega DO   10 mL at 08/20/24 0943    sodium  chloride 0.9 % flush 10 mL  10 mL Intravenous PRN Jordan, Yandel, DO        sodium chloride 0.9 % flush 10 mL  10 mL Intravenous Q12H [place], Yandel, DO   10 mL at 24 0943    sodium chloride 0.9 % flush 10 mL  10 mL Intravenous PRN Jordan, Yandel, DO        sodium chloride 0.9 % flush 20 mL  20 mL Intravenous PRN [place], Yandel, DO        sodium chloride 0.9 % infusion 40 mL  40 mL Intravenous PRN Jordan, Yandel, DO        sodium chloride 0.9 % infusion 40 mL  40 mL Intravenous PRN Jordan, Yandel, DO        sodium chloride 0.9 % infusion 40 mL  40 mL Intravenous PRN Jordan, Yandel, DO        traZODone (DESYREL) tablet 25 mg  25 mg Oral Nightly Jordan, Yandel, DO   25 mg at 24        Operative/Procedure Notes (most recent note)        Edy Glover MD at 24 1632          Dx Hypotension    Physician Dr Glover    Procedure  The left shoulder was prepped and draped. Local was injected near the clavicle. The subclavian vein was accessed and the wire threaded in. The dilator was placed and the catheter threaded in . It was sutured in and a dressing applied. There was good blood return and it flushed easily.     Electronically signed by Edy Glover MD at 24 1634          Physician Progress Notes (most recent note)        Bertha Barnes APRN at 24 0951                     PROGRESS NOTE         Patient Identification:  Name:  Reny Elena  Age:  64 y.o.  Sex:  female  :  1960  MRN:  [id number]  Visit Number:  [id number]  Primary Care Provider:  Susan Stephens APRN         LOS: 17 days       ----------------------------------------------------------------------------------------------------------------------  Subjective       Chief Complaints:    Vomiting        Interval History:      Patient awake and alert this morning.  Requesting to see her significant other Cliff.  Currently on room air with no apparent distress.  No issues or complaints.  Afebrile, denies  diarrhea.  Lung sounds diminished bilaterally.  Abdomen soft, nontender.  Afebrile, no reported diarrhea.  WBC 3.89.    Review of Systems:    EMMANUELLE due to confusion  ----------------------------------------------------------------------------------------------------------------------      Objective       Current [facility] Meds:  atorvastatin, 80 mg, Oral, Nightly  cholecalciferol, 50,000 Units, Oral, Q7 Days  ferrous sulfate, 325 mg, Oral, Daily With Breakfast  FLUoxetine, 10 mg, Oral, Daily  folic acid, 1 mg, Oral, Daily  gentamicin, 1 Application, Topical, Daily  insulin glargine, 10 Units, Subcutaneous, Daily  insulin lispro, 2-9 Units, Subcutaneous, 4x Daily AC & at Bedtime  lacosamide, 50 mg, Oral, Q12H  lansoprazole, 30 mg, Oral, Q AM  levothyroxine, 100 mcg, Oral, QAM AC  magic barrier cream, , Topical, BID  metoprolol tartrate, 12.5 mg, Oral, Q12H  potassium chloride, 40 mEq, Oral, Daily  potassium chloride, 10 mEq, Intravenous, Q1H  sodium chloride, 10 mL, Intravenous, Q12H  sodium chloride, 10 mL, Intravenous, Q12H  sodium chloride, 10 mL, Intravenous, Q12H  sodium chloride, 10 mL, Intravenous, Q12H  sodium chloride, 10 mL, Intravenous, Q12H  traZODone, 25 mg, Oral, Nightly             ----------------------------------------------------------------------------------------------------------------------    Vital Signs:  Temp:  [97.9 °F (36.6 °C)-98.7 °F (37.1 °C)] 98.7 °F (37.1 °C)  Heart Rate:  [63-76] 69  Resp:  [16-18] 16  BP: ()/(52-97) 109/97  No data found.      SpO2 Percentage    08/20/24 0300 08/20/24 0640 08/20/24 0700   SpO2: 97% 97% 97%     SpO2:  [97 %-99 %] 97 %  on   ;   Device (Oxygen Therapy): room air    Body mass index is 16.17 kg/m².  Wt Readings from Last 3 Encounters:   08/20/24 42.7 kg (94 lb 3.2 oz)   07/26/24 45.4 kg (100 lb)   07/26/24 51.7 kg (114 lb)        Intake/Output Summary (Last 24 hours) at 8/20/2024 09  Last data filed at 8/20/2024 0654  Gross per 24 hour   Intake  1080 ml   Output 1276 ml   Net -196 ml     Diet: Regular/House; Feeding Assistance - Nursing; Texture: Pureed (NDD 1); Fluid Consistency: Thin (IDDSI 0)  ----------------------------------------------------------------------------------------------------------------------      Physical Exam:    Constitutional: Frail elderly female drowsy and sedate this morning.  Awake and alert this morning.  Complaints.  HENT:  Head: Normocephalic and atraumatic.  Mouth:  Moist mucous membranes.    Eyes:  Conjunctivae and EOM are normal.  No scleral icterus.  Neck:  Neck supple.  No JVD present.    Cardiovascular:  Normal rate, regular rhythm and normal heart sounds with no murmur.  Bilateral upper extreme edema, improving.  Pulmonary/Chest: Diminished to auscultation bilaterally no respiratory distress, no wheezes, no crackles, with normal breath sounds and good air movement.  Abdominal:  peritoneal dialysis catheter. Soft.  Bowel sounds are normal.  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  Neurological:  Alert and oriented to person, place, and time.  No facial droop.  No slurred speech.    Skin:  Right AKA stump is dressed, clean dry and intact      ----------------------------------------------------------------------------------------------------------------------            Results from last 7 days   Lab Units 08/14/24  0503   PH, ARTERIAL pH units 7.411   PO2 ART mm Hg 72.0*   PCO2, ARTERIAL mm Hg 34.6*   HCO3 ART mmol/L 21.9     Results from last 7 days   Lab Units 08/20/24  0421 08/19/24  0153 08/18/24  1553 08/15/24  1116 08/15/24  0901 08/14/24  2314 08/14/24  1521 08/14/24  0702 08/13/24  2329 08/13/24  2154   LACTATE mmol/L  --   --   --   --   --   --   --   --   --  1.5   WBC 10*3/mm3 3.89 4.27 4.33   < > 3.97 4.31  --  5.70   < >  --    HEMOGLOBIN g/dL 11.1* 12.1 11.4*   < > 11.4* 6.3*  --  9.2*   < >  --    HEMATOCRIT % 31.8* 34.9 33.3*   < >  "33.6* 18.9*  --  26.8*   < >  --    MCV fL 84.8 84.3 85.6   < > 85.1 93.1  --  92.7   < >  --    MCHC g/dL 34.9 34.7 34.2   < > 33.9 33.3  --  34.3   < >  --    PLATELETS 10*3/mm3 35* 32* 36*   < > 61* 79*  --  104*   < >  --    INR   --   --   --   --  0.88* 1.13* 1.20* 1.46*  --  2.13*    < > = values in this interval not displayed.     Results from last 7 days   Lab Units 08/20/24  0421 08/19/24  1324 08/19/24  0153 08/18/24  1553 08/16/24  0646 08/16/24  0028 08/15/24  0901   SODIUM mmol/L 136  --  138 137   < > 137 137   POTASSIUM mmol/L 2.8* 2.9* 2.7* 2.7*   < > 3.3* 3.1*   MAGNESIUM mg/dL  --   --  2.0  --   --  2.2 1.3*   CHLORIDE mmol/L 101  --  102 102   < > 99 98   CO2 mmol/L 24.6  --  23.5 22.1   < > 22.0 20.9*   BUN mg/dL 40*  --  37* 34*   < > 25* 23   CREATININE mg/dL 2.80*  --  2.80* 2.83*   < > 2.61* 2.54*   CALCIUM mg/dL 6.9*  --  7.0* 6.8*   < > 7.2* 7.4*   GLUCOSE mg/dL 112*  --  188* 84   < > 216* 163*   ALBUMIN g/dL 1.9*  --  2.1*  --   --  2.5* 2.9*   BILIRUBIN mg/dL 0.4  --  0.5  --   --  0.6 1.2   ALK PHOS U/L 372*  --  488*  --   --  496* 422*   AST (SGOT) U/L 53*  --  77*  --   --  258* 400*   ALT (SGPT) U/L 25  --  26  --   --  30 32    < > = values in this interval not displayed.   Estimated Creatinine Clearance: 13.3 mL/min (A) (by C-G formula based on SCr of 2.8 mg/dL (H)).  No results found for: \"AMMONIA\"      Glucose   Date/Time Value Ref Range Status   08/20/2024 0713 85 70 - 130 mg/dL Final   08/20/2024 0638 59 (L) 70 - 130 mg/dL Final   08/19/2024 2101 187 (H) 70 - 130 mg/dL Final   08/19/2024 1645 254 (H) 70 - 130 mg/dL Final   08/19/2024 1128 186 (H) 70 - 130 mg/dL Final   08/19/2024 0645 159 (H) 70 - 130 mg/dL Final   08/18/2024 2043 176 (H) 70 - 130 mg/dL Final   08/18/2024 1708 99 70 - 130 mg/dL Final     Lab Results   Component Value Date    HGBA1C 6.10 (H) 08/02/2024     Lab Results   Component Value Date    TSH 3.580 08/13/2024    FREET4 1.08 08/14/2024       Blood Culture " "  Date Value Ref Range Status   08/02/2024 No growth at 24 hours  Preliminary   08/02/2024 No growth at 24 hours  Preliminary     No results found for: \"URINECX\"  No results found for: \"WOUNDCX\"  No results found for: \"STOOLCX\"  No results found for: \"RESPCX\"  Pain Management Panel  More data exists         Latest Ref Rng & Units 7/21/2024 4/28/2024   Pain Management Panel   Amphetamine, Urine Qual Negative Negative  Negative    Barbiturates Screen, Urine Negative Negative  Negative    Benzodiazepine Screen, Urine Negative Negative  Negative    Buprenorphine, Screen, Urine Negative Negative  Negative    Cocaine Screen, Urine Negative Negative  Negative    Fentanyl, Urine Negative Negative  Negative    Methadone Screen , Urine Negative Negative  Negative    Methamphetamine, Ur Negative Negative  Negative       Details                     ----------------------------------------------------------------------------------------------------------------------  Imaging Results (Last 24 Hours)       ** No results found for the last 24 hours. **            ----------------------------------------------------------------------------------------------------------------------    Pertinent Infectious Disease Results    Thank you Dr. Crawford for allowing us to participate in the care of your patient.  As you well know, Ms. Reny Elena is a 64 y.o. female with past medical history significant for seizure disorder, type 2 diabetes, arthritis, CHF, hypothyroidism, hypertension, hyperlipidemia, GERD, PAD, ESRD, iron deficiency anemia,, who presented to [facility] Emergency Department on 8/2/2024 for nausea, vomiting, diarrhea and worsening pain of right diabetic foot wound.  Patient was recently seen in our ED on 7/21/2024 with complaints of hallucinations, fever, and right foot pain.  At this time, patient was diagnosed with right heel ulcer and UTI.  She was sent home with an antibiotic but was unsure of the name.  " Patient reported she started having nausea and vomiting after taking the antibiotics so she stopped taking them.  However, she continued to have nausea and vomiting.       Patient was tachycardic in the ED.Labs show K+ at upper limit of normal, BUN 30, cr 4.36, glucose 176, alk phos 323 and albumin 2.9, otherwise normal LFTs. CRP mildly elevated at 2.34, while lactate and WBC are normal. UA shows large leuk esterase and unable to determine RBC, WBC, bacteria of squamous cells due to loaded field.  Patient's urine culture from 7/21/2024 grew E. coli.  She had also been following with wound care who it obtained a wound culture on the same day.  This culture finalized with mixed gram-negative maurisio and light growth of Enterococcus faecalis.       X-ray of the right foot performed on 8/2/2024 revealed partial amputation of the fifth metatarsal. Bony demineralization. No acute fracture or dislocation. No lytic or blastic bony lesions seen. Diffuse interphalangeal joint space loss. Mild degenerative changes in the midfoot. No cortical erosion. Fixation timur in the tibia/talus/calcaneus noted. Diffuse soft tissue swelling. Vascular calcifications. No soft tissue gas.  CT abdomen/pelvis revealed peritoneal dialysis catheter noted in the anterior right abdominal wall with the catheter noted to terminate in the left lateral pelvis. Small volume of free fluid in the right upper quadrant and lower posterior pelvis and right lower quadrant. Slightly elevated left hemidiaphragm. No bowel or renal obstruction. No abdominal aortic aneurysm. No features of acute appendicitis. Stranding identified in the presacral space could present changes related to mild distal colitis. Fluid-filled bladder with small volume of air in the bladder lumen. No pneumatosis. No conclusive features of free air. Distended configuration to the gallbladder likely due to fasting. No conclusive features of gastritis or enteritis.       Patient continued to  have nausea and vomiting after 2 doses of Zofran.  She was admitted for further treatment.  Subsequently, patient was started on IV Rocephin in the setting of E. coli UTI.  Flagyl was added to cover possible colitis.  Patient was also started on IV vancomycin for Enterococcus faecalis wound infection.  General surgery consulted for possible debridement of patient's wound.     Assessment/Plan       Assessment     R foot wound infection, Enterococcus faecalis  Possible osteomyelitis of R foot  Hardware present in RLE  E. Coli UTI  Possible colitis         Plan      Patient awake and alert this morning.  Requesting to see her significant other Cliff.  Currently on room air with no apparent distress.  No issues or complaints.  Afebrile, denies diarrhea.  Lung sounds diminished bilaterally.  Abdomen soft, nontender.  Afebrile, no reported diarrhea.  WBC 3.89.    CT of the abdomen pelvis from 8/17/2024 reports mild chronic renal cortical volume loss, significant volume of dense calcifications throughout the abdominal aortic impact pelvic aortic branch segments consistent with atherosclerotic disease, small left pleural effusion and left lower lobe atelectasis, small volume of fluid in the abdomen and pelvis with peritoneal dialysis catheter noted to terminate in the lateral left abdomen, moderate stranding in the subcutaneous fat consistent with edema in the abdominal wall and pelvic wall extending into the proximal right left lower extremity, degenerative disc disease throughout the lumbar spine with endplate and facet hypertrophic changes.    Peritoneal body fluid culture shows no growth thus far.    Patient completed empiric courses of vancomycin and Zosyn.  Patient has remained stable off of antibiotic therapy for several days.  Infectious disease will sign off for now.  Please contact us if we can further assist in this case.      ANTIMICROBIAL THERAPY    cefdinir - 300 MG     Code Status:   Code Status and Medical  Interventions: No CPR (Do Not Attempt to Resuscitate); Limited Support; No intubation (DNI)   Ordered at: 08/04/24 1645     Medical Intervention Limits:    No intubation (DNI)     Code Status (Patient has no pulse and is not breathing):    No CPR (Do Not Attempt to Resuscitate)     Medical Interventions (Patient has pulse or is breathing):    Limited Support       DARIUS Moreno  08/20/24  09:51 EDT    Electronically signed by Bertha Barnes APRN at 08/20/24 1010          Consult Notes (most recent note)        Jennifer Sanders APRN at 08/14/24 1135        Consult Orders    1. Inpatient Palliative Care MD Consult [[id number]] ordered by Johnson Rider MD at 08/14/24 0951                 Palliative Care Initial Consult     Attending Physician: Rebeka Woodruff MD  Referring Provider: Ashu Woodruff MD    assistance with advance directives, assistance with clarification of goals of care, and psychosocial support  Code Status:   Code Status and Medical Interventions: No CPR (Do Not Attempt to Resuscitate); Limited Support; No intubation (DNI)   Ordered at: 08/04/24 1645     Medical Intervention Limits:    No intubation (DNI)     Code Status (Patient has no pulse and is not breathing):    No CPR (Do Not Attempt to Resuscitate)     Medical Interventions (Patient has pulse or is breathing):    Limited Support      Advanced Directives: Advance Directive Status: Patient has advance directive, copy in chart   Healthcare surrogate: PRESLEY Leiva significant other  Goals of Care: I was able to speak with Cliff today at bedside to discuss GOC/ACP as Reny is not able herself at this time. Cliff confirmed that Reny is a DNR/DNI. We discussed Reny's condition, quality of life and that Cliff would like to get her back home with him if she was able. I told Cliff that we would be her e for support for Reny and him.    HPI:  Reny Elena is a 64 y.o. female admitted on 8/2/2024 due to nausea, vomiting, with  right foot pain and fever for a week before presenting to the ER. Reny has a medical history of arthritis, insulin dependent type II DM, chronic diastolic CHF, hypothyroidism, seizure disorder, HTN, HLD, GERD, iron deficiency anemia, PAD, and ESRD on PD,   Reny was given an antibiotic at prior ER visit on 24 due to a UTI and she stated after a couple of days of antibiotic she began to experience nausea and vomiting, apparently she stopped taking the antibiotic and symptoms continued to persist. This visit in the ED Reny did complain of increased rt foot pain. Reny was tachycardic. Since admission Reny has underwent a right AKA om 24 due to rt heel osteomyelitis. Pulmonary, Infectious disease, and Nephrology are all consulted on Ms Elena.      ROS: Negative except as above in HPI.     Past Medical History:   Diagnosis Date    Arthritis     Closed fracture of right fibula and tibia 2018    Depression     Diabetic ulcer of left foot associated with type 2 diabetes mellitus 2017    Diastolic dysfunction     Disease of thyroid gland     Elevated cholesterol     End stage renal disease     Essential hypertension     GERD (gastroesophageal reflux disease)     History of transfusion     Hyperlipidemia     Iron deficiency anemia 2018    PAD (peripheral artery disease)     Renal failure     Type 2 diabetes mellitus with hyperglycemia, with long-term current use of insulin 2018     Past Surgical History:   Procedure Laterality Date    ABDOMINAL SURGERY      ABOVE KNEE AMPUTATION Right 2024    Procedure: AMPUTATION ABOVE KNEE;  Surgeon: Angelo Santoro MD;  Location: [facility];  Service: General;  Laterality: Right;    BACK SURGERY      Post spinal diskectomy, osteophytectomy in one lumbar interspace    CATARACT EXTRACTION      Left      SECTION      DENTAL PROCEDURE      ENDOSCOPY      FRACTURE SURGERY      right leg    HYSTERECTOMY      INCISION AND DRAINAGE LEG Left  4/15/2017    Procedure: INCISION AND DRAINAGE LOWER EXTREMITY;  Surgeon: Basilio Morris MD;  Location: [facility] OR;  Service:     INCISION AND DRAINAGE LEG Left 5/26/2017    Procedure: Irrigation and Debridment abcess diabetic wound left foot with deep culture;  Surgeon: Basilio Morris MD;  Location: [facility] OR;  Service:     INSERTION PERITONEAL DIALYSIS CATHETER N/A 12/16/2021    Procedure: INSERTION PERITONEAL DIALYSIS CATHETER LAPAROSCOPIC;  Surgeon: Edy Glover MD;  Location: [facility] OR;  Service: General;  Laterality: N/A;    KNEE ARTHROSCOPY Right     ORIF TIBIA FRACTURE Right 12/28/2018    Procedure: TIBIAL  FRACTURE OPEN REDUCTION INTERNAL FIXATION;  Surgeon: Jung Barragan MD;  Location: [facility] OR;  Service: Orthopedics    TOE AMPUTATION Right     5th    TUBAL ABDOMINAL LIGATION       Social History     Socioeconomic History    Marital status:    Tobacco Use    Smoking status: Never     Passive exposure: Never    Smokeless tobacco: Never   Vaping Use    Vaping status: Never Used   Substance and Sexual Activity    Alcohol use: No    Drug use: No    Sexual activity: Defer     Partners: Male     Family History   Problem Relation Age of Onset    Heart disease Mother     Cancer Mother     COPD Mother     Diabetes Other     Depression Other        Allergies   Allergen Reactions    Morphine Nausea And Vomiting    Penicillins Hives and GI Intolerance             Codeine Hives, Rash and GI Intolerance     Pt tolerates [place] @ home    Sulfa Antibiotics Hives, Rash and GI Intolerance     NAUSEA TOO       Current Facility-Administered Medications   Medication Dose Route Frequency Provider Last Rate Last Admin    aspirin EC tablet 81 mg  81 mg Oral Daily Rebeka Woodruff MD   81 mg at 08/14/24 0800    [Held by provider] atorvastatin (LIPITOR) tablet 80 mg  80 mg Oral Nightly Angelo Santoro MD   80 mg at 08/10/24 2034    sennosides-docusate (PERICOLACE) 8.6-50 MG per tablet 2 tablet  2 tablet Oral BID  PRN Rebeka Woodruff MD        And    polyethylene glycol (MIRALAX) packet 17 g  17 g Oral Daily PRN Rebeka Woodruff MD        And    bisacodyl (DULCOLAX) EC tablet 5 mg  5 mg Oral Daily PRN Rebeka Woodruff MD        And    bisacodyl (DULCOLAX) suppository 10 mg  10 mg Rectal Daily PRN Rebeka Woodruff MD        Calcium Replacement - Follow Nurse / BPA Driven Protocol   Does not apply PRN Rebeka Woodruff MD        dextrose (D50W) (25 g/50 mL) IV injection 25 g  25 g Intravenous Q15 Min PRN Rebeka Woodruff MD   25 g at 08/12/24 1740    dextrose (D50W) (25 g/50 mL) IV injection 25 g  25 g Intravenous Q15 Min PRN Rebeka Woodruff MD        dextrose (GLUTOSE) oral gel 15 g  15 g Oral Q15 Min PRN Rebeka Woodruff MD        ferrous sulfate tablet 325 mg  325 mg Oral Daily With Breakfast Rebeka Woodruff MD   325 mg at 08/14/24 0759    FLUoxetine (PROzac) capsule 10 mg  10 mg Oral Daily Rebeka Woodruff MD   10 mg at 08/14/24 0800    fluticasone (FLONASE) 50 MCG/ACT nasal spray 2 spray  2 spray Nasal Daily PRN Rebeka Woodruff MD        folic acid (FOLVITE) tablet 1 mg  1 mg Oral Daily Rebeka Woodruff MD   1 mg at 08/14/24 0800    glucagon HCl (Diagnostic) injection 1 mg  1 mg Intramuscular Q15 Min PRN Rebeka Woodruff MD        Hydrocortisone Sod Suc (PF) (Solu-CORTEF) injection 50 mg  50 mg Intravenous Q6H Johnson Rider MD   50 mg at 08/14/24 1359    Insulin Lispro (humaLOG) injection 2-7 Units  2-7 Units Subcutaneous 4x Daily AC & at Bedtime Rebeka Woodruff MD   2 Units at 08/14/24 0801    ipratropium (ATROVENT) nebulizer solution 0.5 mg  0.5 mg Nebulization Q6H PRN Rebeka Woodruff MD        lacosamide (VIMPAT) tablet 50 mg  50 mg Oral Q12H Rebeka Woodruff MD   50 mg at 08/14/24 0800    levothyroxine (SYNTHROID, LEVOTHROID) tablet 100 mcg  100 mcg Oral QAM AC Rebeka Woodruff MD   100 mcg at 08/14/24 0759    Lidocaine 4 % 1 patch  1 patch  Transdermal Daily PRN Rebeka Woodruff MD        Magnesium Standard Dose Replacement - Follow Nurse / BPA Driven Protocol   Does not apply PRN Rebeka Woodruff MD        midodrine (PROAMATINE) tablet 7.5 mg  7.5 mg Oral TID AC Rebeka Woodruff MD   7.5 mg at 08/12/24 1735    mupirocin (BACTROBAN) 2 % ointment 1 Application  1 Application Topical Q12H Rebeka Woodruff MD   1 Application at 08/14/24 0957    nitroglycerin (NITROSTAT) SL tablet 0.4 mg  0.4 mg Sublingual Q5 Min PRN Rebeka Woodruff MD        norepinephrine (LEVOPHED) 8 mg in 250 mL NS infusion (premix)  0.02-0.3 mcg/kg/min Intravenous Titrated Rebeka Woodruff MD   Stopped at 08/13/24 0322    oxyCODONE (ROXICODONE) immediate release tablet 5 mg  5 mg Oral Q4H PRN Jennifer Sanders APRN   5 mg at 08/14/24 1205    pantoprazole (PROTONIX) EC tablet 40 mg  40 mg Oral QAM AC Rebeka Woodruff MD   40 mg at 08/14/24 0759    Pharmacy Consult - Pharmacy to dose   Does not apply Continuous Rebeka Woodruff MD        Pharmacy Consult - Pharmacy to dose   Does not apply Continuous Rebeka Woodruff MD        phenylephrine (KAILA-SYNEPHRINE) 50 mg in 250 mL NS infusion  0.5-3 mcg/kg/min Intravenous Titrated Rebeka Woodruff MD   Stopped at 08/13/24 0519    Phosphorus Replacement - Follow Nurse / BPA Driven Protocol   Does not apply PRN Rebeka Woodruff MD        piperacillin-tazobactam (ZOSYN) IVPB 3.375 g IVPB in 100 mL NS (VTB)  3.375 g Intravenous Q12H Rebeka Woodruff MD   3.375 g at 08/14/24 0801    Potassium Replacement - Follow Nurse / BPA Driven Protocol   Does not apply PRN Rebeka Woodruff MD        sodium chloride 0.45 % 1,000 mL with sodium bicarbonate 8.4 % 75 mEq infusion   Intravenous Continuous Rebeka Woodruff MD 75 mL/hr at 08/14/24 0800 New Bag at 08/14/24 0800    sodium chloride 0.9 % bolus 1,000 mL  1,000 mL Intravenous Once Rebeka Woodruff MD 2,000 mL/hr at 08/12/24 0315 Currently  Infusing at 08/12/24 0315    sodium chloride 0.9 % flush 10 mL  10 mL Intravenous PRN Rebeka Woodruff MD        sodium chloride 0.9 % flush 10 mL  10 mL Intravenous Q12H Rebeka Woodruff MD   10 mL at 08/14/24 0957    sodium chloride 0.9 % flush 10 mL  10 mL Intravenous PRN Rebeka Woodruff MD        sodium chloride 0.9 % flush 10 mL  10 mL Intravenous Q12H Rebeka Woodruff MD   10 mL at 08/14/24 0957    sodium chloride 0.9 % flush 10 mL  10 mL Intravenous Q12H Galam, Rebeka Anguiano MD   10 mL at 08/14/24 0957    sodium chloride 0.9 % flush 10 mL  10 mL Intravenous Q12H Kacy, Rebeka Anguiano MD   10 mL at 08/14/24 0957    sodium chloride 0.9 % flush 10 mL  10 mL Intravenous PRN Rebeka Woodruff MD        sodium chloride 0.9 % flush 10 mL  10 mL Intravenous Q12H Rebeka Woodruff MD   10 mL at 08/14/24 0957    sodium chloride 0.9 % flush 10 mL  10 mL Intravenous PRN Rebeka Woodruff MD        sodium chloride 0.9 % flush 20 mL  20 mL Intravenous PRN Rebeka Woodruff MD        sodium chloride 0.9 % infusion 40 mL  40 mL Intravenous PRN Rebeka Woodruff MD        sodium chloride 0.9 % infusion 40 mL  40 mL Intravenous PRN Rebeka Woodruff MD        sodium chloride 0.9 % infusion 40 mL  40 mL Intravenous PRN Rebeka Woodruff MD        traZODone (DESYREL) tablet 25 mg  25 mg Oral Nightly Rebeka Woodruff MD   25 mg at 08/12/24 2013    Vancomycin Pharmacy Intermittent/Pulse Dosing   Does not apply Daily Rebeka Woodruff MD         norepinephrine, 0.02-0.3 mcg/kg/min, Last Rate: Stopped (08/13/24 0322)  Pharmacy Consult - Pharmacy to dose,   Pharmacy Consult - Pharmacy to dose,   phenylephrine, 0.5-3 mcg/kg/min, Last Rate: Stopped (08/13/24 0548)  sodium chloride 0.45 % 1,000 mL with sodium bicarbonate 8.4 % 75 mEq infusion, , Last Rate: 75 mL/hr at 08/14/24 0800        senna-docusate sodium **AND** polyethylene glycol **AND** bisacodyl **AND** bisacodyl    Calcium  "Replacement - Follow Nurse / BPA Driven Protocol    dextrose    dextrose    dextrose    fluticasone    glucagon (human recombinant)    ipratropium    Lidocaine    Magnesium Standard Dose Replacement - Follow Nurse / BPA Driven Protocol    nitroglycerin    oxyCODONE    Phosphorus Replacement - Follow Nurse / BPA Driven Protocol    Potassium Replacement - Follow Nurse / BPA Driven Protocol    sodium chloride    sodium chloride    sodium chloride    sodium chloride    sodium chloride    sodium chloride    sodium chloride    sodium chloride    Current medication reviewed for route, type, dose and frequency and are current per MAR.    Palliative Performance Scale Score:     /96   Pulse 92   Temp 98 °F (36.7 °C) (Oral)   Resp 12   Ht 162.6 cm (64.02\")   Wt 46.5 kg (102 lb 8.2 oz)   LMP 05/26/2005   SpO2 98%   BMI 17.59 kg/m²     Intake/Output Summary (Last 24 hours) at 8/14/2024 1519  Last data filed at 8/14/2024 1358  Gross per 24 hour   Intake 3492.5 ml   Output 400 ml   Net 3092.5 ml       PE:  General Appearance:    Chronically ill appearing, alert, slow to respond.cooperative, NAD   HEENT:    NC/AT, without obvious abnormality, EOMI, anicteric    Neck:   supple, trachea midline, no JVD   Lungs:     Unlabored respirations, no wheezing rhonchi or rales noted.    Heart:    RRR, normal S1 and S2, no M/R/G   Abdomen:     Soft, NT, ND, NABS    Extremities:   RT Aka with kerlex and ace bandage intact, edema in left lower extremity   Pulses:   Pulses palpable and equal bilaterally   Skin:   Warm, dry   Neurologic:   Awake and alert to self only, slow to respond to questions, with sometimes appropriate answers.   Psych:   Calm, flat       Labs:   Results from last 7 days   Lab Units 08/14/24  0702   WBC 10*3/mm3 5.70   HEMOGLOBIN g/dL 9.2*   HEMATOCRIT % 26.8*   PLATELETS 10*3/mm3 104*     Results from last 7 days   Lab Units 08/14/24  0702   SODIUM mmol/L 134*   POTASSIUM mmol/L 3.7   CHLORIDE mmol/L 100   CO2 " mmol/L 18.5*   BUN mg/dL 21   CREATININE mg/dL 3.03*   GLUCOSE mg/dL 136*   CALCIUM mg/dL 6.8*     Results from last 7 days   Lab Units 08/14/24  0702   SODIUM mmol/L 134*   POTASSIUM mmol/L 3.7   CHLORIDE mmol/L 100   CO2 mmol/L 18.5*   BUN mg/dL 21   CREATININE mg/dL 3.03*   CALCIUM mg/dL 6.8*   BILIRUBIN mg/dL 0.9   ALK PHOS U/L 433*   ALT (SGPT) U/L 73*   AST (SGOT) U/L 1,095*   GLUCOSE mg/dL 136*     Imaging Results (Last 72 Hours)       Procedure Component Value Units Date/Time    CT Head Without Contrast [[id number]] Collected: 08/14/24 0059     Updated: 08/14/24 0101    Narrative:      Noncontrast CT brain     Indications: Altered mental status     Technique: Noncontrast CT images of the brain were obtained.  Limited  exposure control, adjustment of the MA and/or KV according to patient  size or use of an iterative reconstruction technique was utilized.     Findings CT brain:     Comparison is made to the prior study dated 4/28/2024.     Examination is somewhat limited by patient positioning.  Axial images  are not true brain axial images.  Allowing for this, there is no  evidence of acute intercranial hemorrhage.  The ventricles are  prominent, stable from the prior study.  Patchy and confluent areas of  hypodensity involve the periventricular deep white matter compatible  sequelae of chronic small vessel ischemic changes.  There is no  mass-effect or midline shift.  No abnormal extra-axial fluid collections  are demonstrated.       Impression:      Impression:     No CT evidence of an acute intracranial process.     This report was finalized on 8/14/2024 12:59 AM by Chin Chowdhury MD.       CT Chest Without Contrast Diagnostic [[id number]] Collected: 08/13/24 1955     Updated: 08/13/24 2010    Narrative:      Procedure: Contiguous axial images were obtained through the chest  abdomen and pelvis without intravenous contrast.  The use of sagittal  coronal reformations are supplied.     Comparison: CT  abdomen 8/22/2024, chest 4/28/2024     Findings     CHEST: Chest is well-expanded with diffuse groundglass attenuation  throughout all lobes.     A small left pleural effusion is present with airspace consolidation.     Trachea and mainstem bronchi are patent.     Central venous catheter present with distal tip at the atrial caval  junction.  Heart size within normal limits.     No adenopathy in the chest.     Cachexia and anasarca noted.     In bone windows, sternum, thoracic vertebral bodies and ribs are intact.   No pneumothorax.     ABDOMEN/PELVIS: Moderate ascites present.     Diffuse anasarca noted.  A catheter enters the mid abdomen, to the left  of midline with distal tip coiled in the left mid abdomen.     There may be fluid surrounding the gallbladder which is not  well-defined.     No extraluminal gas.  Urinary bladder distends normally.  A large amount  of free fluid in the pelvis.  Uterus is not identified.  A small amount  of formed stool present in the colon.  A discrete appendix is not  identified.     The noncontrast enhanced liver, spleen, stomach and adrenals are  morphologically unremarkable.  Kidneys are atrophic with calcified  vascular structures.     No dilated loops of bowel or bowel obstruction.     Above-the-knee amputation noted on the  view.     Moderate degenerative change at L5-S1.       Impression:         1.  Small to moderate left pleural effusion with adjacent airspace  consolidation suggesting atelectasis or pneumonia.     2.  Diffuse groundglass attenuation of the lungs compatible with  alveolitis.     3.   Mildly distended gallbladder with indistinct appearance.  If acute  cholecystitis is of clinical concern, right upper quadrant ultrasound  could be considered.     4.  Anasarca              This report was finalized on 8/13/2024 8:08 PM by Regina Magaña MD.       CT Abdomen Pelvis Without Contrast [[id number]] Collected: 08/13/24 1955     Updated: 08/13/24 2010     Narrative:      Procedure: Contiguous axial images were obtained through the chest  abdomen and pelvis without intravenous contrast.  The use of sagittal  coronal reformations are supplied.     Comparison: CT abdomen 8/22/2024, chest 4/28/2024     Findings     CHEST: Chest is well-expanded with diffuse groundglass attenuation  throughout all lobes.     A small left pleural effusion is present with airspace consolidation.     Trachea and mainstem bronchi are patent.     Central venous catheter present with distal tip at the atrial caval  junction.  Heart size within normal limits.     No adenopathy in the chest.     Cachexia and anasarca noted.     In bone windows, sternum, thoracic vertebral bodies and ribs are intact.   No pneumothorax.     ABDOMEN/PELVIS: Moderate ascites present.     Diffuse anasarca noted.  A catheter enters the mid abdomen, to the left  of midline with distal tip coiled in the left mid abdomen.     There may be fluid surrounding the gallbladder which is not  well-defined.     No extraluminal gas.  Urinary bladder distends normally.  A large amount  of free fluid in the pelvis.  Uterus is not identified.  A small amount  of formed stool present in the colon.  A discrete appendix is not  identified.     The noncontrast enhanced liver, spleen, stomach and adrenals are  morphologically unremarkable.  Kidneys are atrophic with calcified  vascular structures.     No dilated loops of bowel or bowel obstruction.     Above-the-knee amputation noted on the  view.     Moderate degenerative change at L5-S1.       Impression:         1.  Small to moderate left pleural effusion with adjacent airspace  consolidation suggesting atelectasis or pneumonia.     2.  Diffuse groundglass attenuation of the lungs compatible with  alveolitis.     3.   Mildly distended gallbladder with indistinct appearance.  If acute  cholecystitis is of clinical concern, right upper quadrant ultrasound  could be considered.      4.  Anasarca              This report was finalized on 8/13/2024 8:08 PM by Regina Magaña MD.       XR Chest 1 View [[id number]] Collected: 08/12/24 1630     Updated: 08/12/24 1632    Narrative:      EXAM:    XR Chest, 1 View     EXAM DATE:    8/12/2024 4:26 PM     CLINICAL HISTORY:    CVC Placement Verification; E11.621-Type 2 diabetes mellitus with foot  ulcer; L97.416-Non-pressure chronic ulcer of right heel and midfoot with  bone involvement without evidence of necrosis; Z99.2-Dependence on renal  dialysis; R11.2-Nausea with vomiting, unspecified; N30.01-Acute cystitis  with hematuria; L97.519-Non-pressure chronic ulcer of other part of  right foot with unspecified severity     TECHNIQUE:    Frontal view of the chest.     COMPARISON:    8/11/2024     FINDINGS:    LUNGS AND PLEURAL SPACES:  Coarsened interstitial markings noted  throughout the lungs.  No consolidation.  No pneumothorax.    HEART:  Unremarkable as visualized.  No cardiomegaly.    MEDIASTINUM:  Unremarkable as visualized.  Normal mediastinal contour.    BONES/JOINTS:  Unremarkable as visualized.  No acute fracture.    TUBES, LINES AND DEVICES:  Left subclavian central venous catheter tip  in the superior vena cava.       Impression:        No acute cardiopulmonary findings identified.        This report was finalized on 8/12/2024 4:30 PM by Dr. Harrison Biswas MD.       US Liver [[id number]] Collected: 08/11/24 1655     Updated: 08/11/24 1659    Narrative:      PROCEDURE: Abdominal ultrasound evaluation performed on August 11, 2024.  Color flow imaging performed.     HISTORY: Elevated liver function tests.     COMPARISON: None.     FINDINGS:     Echogenic liver suggestive of fatty infiltration.  Small volume of free fluid adjacent to the liver consistent with  ascites.  Patent hepatic veins with appropriate direction of flow.  Patent main portal vein with appropriate direction of flow.  No hepatic mass  No dilated intrahepatic bile ducts  Sludge  noted in the gallbladder lumen.  No gallbladder wall thickening or para cholecystic fluid.  No right renal hydronephrosis  No features of cholecystitis.       Impression:         1.  Echogenic liver suggestive of fatty infiltration.  2.  Patent main portal vein with appropriate direction of flow.  3.  Patent hepatic veins with appropriate direction of flow.  4.  Sludge noted in the gallbladder lumen.  5.  No features of cholecystitis.  6.  Small volume of free fluid in the right upper quadrant consistent  with ascites.  7.  No hepatic mass or cyst  8.  No dilated intrahepatic bile ducts.     This report was finalized on 8/11/2024 4:57 PM by Carlos Andrews MD.               Diagnostics: Reviewed    A: Reny Elena is a 64 y.o. female admitted on 8/2/2024 due to nausea, vomiting, with right foot pain and fever for a week before presenting to the ER. Reny has a medical history of arthritis, insulin dependent type II DM, chronic diastolic CHF, hypothyroidism, seizure disorder, HTN, HLD, GERD, iron deficiency anemia, PAD, and ESRD on PD,   Reny was given an antibiotic at prior ER visit on 7/21/24 due to a UTI and she stated after a couple of days of antibiotic she began to experience nausea and vomiting, apparently she stopped taking the antibiotic and symptoms continued to persist. This visit in the ED Reny did complain of increased rt foot pain. Reny was tachycardic. 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 Pulmonary, Infectious disease, and Nephrology are all consulted on Ms Elena.           P:    I was able to speak with Cliff today at bedside to discuss GOC/ACP as Reny is not able herself at this time. Cliff confirmed that Reny is a DNR/DNI. We discussed Reny's condition, quality of life and that Cliff would like to get her back home with him if she was able. I told Cliff that we would be her e for support for Reny and him. I discussed Reny/treatment  plan with Dr Woodruff and GLORIA Dunham.    We appreciate the consult and the opportunity to participate in Reny Elena's care. We will continue to follow along. Please do not hesitate to contact us regarding further symptom management or goals of care needs, including after hours or on weekends via our on call provider at [phone].     Time: 55 minutes spent reviewing medical and medication records, assessing and examining patient, discussing with family, answering questions, providing some guidance about a plan and documentation of care, and coordinating care with other healthcare members, with > 50% time spent face to face.     DARIUS Berrios    8/14/2024      Electronically signed by Jennifer Sanders, DARIUS at 08/14/24 3960

## 2024-08-20 NOTE — THERAPY TREATMENT NOTE
Acute Care - Physical Therapy Treatment Note  [facility]     Patient Name: Reny Elena  : 1960  MRN: [id number]  Today's Date: 2024      Visit Dx:     ICD-10-CM ICD-9-CM   1. Diabetic ulcer of right heel associated with type 2 diabetes mellitus, with bone involvement without evidence of necrosis  E11.621 250.80    L97.416 707.14   2. Peritoneal dialysis finding  Z99.2 V45.11   3. Nausea and vomiting, unspecified vomiting type  R11.2 787.01   4. Acute cystitis with hematuria  N30.01 595.0   5. Ulcer of right foot, unspecified ulcer stage  L97.519 707.15     Patient Active Problem List   Diagnosis    Tibia/fibula fracture    Iron deficiency anemia    Type 2 diabetes mellitus with hyperglycemia, with long-term current use of insulin    Wound of right ankle    Cellulitis    Metabolic encephalopathy    History of non-ST elevation myocardial infarction (NSTEMI)    HTN (hypertension)    CVA (cerebral vascular accident)    Chronic diastolic CHF (congestive heart failure)    Decubitus ulcer of coccyx, unspecified pressure ulcer stage    Depression    Expressive aphasia    Impaired mobility and ADLs    Hyperkalemia    Subdural hematoma    Severe malnutrition    Cerebrovascular accident (CVA), unspecified mechanism    PRES (posterior reversible encephalopathy syndrome)    Debility    Stage 5 chronic kidney disease on chronic dialysis    Seizure-like activity    Seizure    Open wound    Pressure injury of right heel, stage 3    Pressure injury of sacral region, stage 2    Pressure injury of right heel, unstageable    Diabetic ulcer of right heel     Past Medical History:   Diagnosis Date    Arthritis     Closed fracture of right fibula and tibia 2018    Depression     Diabetic ulcer of left foot associated with type 2 diabetes mellitus 2017    Diastolic dysfunction     Disease of thyroid gland     Elevated cholesterol     End stage renal disease     Essential hypertension     GERD (gastroesophageal  reflux disease)     History of transfusion     Hyperlipidemia     Iron deficiency anemia 2018    PAD (peripheral artery disease)     Renal failure     Type 2 diabetes mellitus with hyperglycemia, with long-term current use of insulin 2018     Past Surgical History:   Procedure Laterality Date    ABDOMINAL SURGERY      ABOVE KNEE AMPUTATION Right 2024    Procedure: AMPUTATION ABOVE KNEE;  Surgeon: Angelo Santoro MD;  Location: [facility] OR;  Service: General;  Laterality: Right;    BACK SURGERY      Post spinal diskectomy, osteophytectomy in one lumbar interspace    CATARACT EXTRACTION      Left      SECTION      DENTAL PROCEDURE      ENDOSCOPY      FRACTURE SURGERY      right leg    HYSTERECTOMY      INCISION AND DRAINAGE LEG Left 4/15/2017    Procedure: INCISION AND DRAINAGE LOWER EXTREMITY;  Surgeon: Basilio Morris MD;  Location: [facility] OR;  Service:     INCISION AND DRAINAGE LEG Left 2017    Procedure: Irrigation and Debridment abcess diabetic wound left foot with deep culture;  Surgeon: Basilio Morris MD;  Location: [facility] OR;  Service:     INSERTION PERITONEAL DIALYSIS CATHETER N/A 2021    Procedure: INSERTION PERITONEAL DIALYSIS CATHETER LAPAROSCOPIC;  Surgeon: Edy Glover MD;  Location: [facility] OR;  Service: General;  Laterality: N/A;    KNEE ARTHROSCOPY Right     ORIF TIBIA FRACTURE Right 2018    Procedure: TIBIAL  FRACTURE OPEN REDUCTION INTERNAL FIXATION;  Surgeon: Jung Barragan MD;  Location: [facility] OR;  Service: Orthopedics    TOE AMPUTATION Right     5th    TUBAL ABDOMINAL LIGATION       PT Assessment (Last 12 Hours)       PT Evaluation and Treatment       Row Name 24 [id number]          Physical Therapy Time and Intention    Subjective Information complains of;pain  -AG     Document Type therapy note (daily note)  -AG     Mode of Treatment physical therapy  -AG     Patient Effort poor  -AG     Symptoms Noted During/After Treatment none  -AG       Row  Name 08/20/24 1138          General Information    Patient Profile Reviewed yes  -AG     Patient Observations poorly cooperative  -AG     Patient/Family/Caregiver Comments/Observations pt. L sidelying in bed; AKA is wrapped and appears dry.  -AG     Existing Precautions/Restrictions fall  -AG       Row Name 08/20/24 1138          Bed Mobility    Bed Mobility rolling left;rolling right;scooting/bridging  -AG     Rolling Left [place] (Bed Mobility) dependent (less than 25% patient effort)  -AG     Rolling Right [place] (Bed Mobility) dependent (less than 25% patient effort)  -AG     Scooting/Bridging [place] (Bed Mobility) dependent (less than 25% patient effort)  -AG       Row Name 08/20/24 1138          Motor Skills    Therapeutic Exercise hip;knee;ankle  -AG       Row Name 08/20/24 1138          Hip (Therapeutic Exercise)    Hip (Therapeutic Exercise) PROM (passive range of motion)  -AG     Hip PROM (Therapeutic Exercise) left;flexion;extension;aBduction;aDduction;supine  -AG       Row Name 08/20/24 1138          Knee (Therapeutic Exercise)    Knee (Therapeutic Exercise) PROM (passive range of motion)  -AG     Knee PROM (Therapeutic Exercise) left;flexion;extension;supine  -AG       Row Name 08/20/24 1138          Ankle (Therapeutic Exercise)    Ankle (Therapeutic Exercise) PROM (passive range of motion)  -AG     Ankle PROM (Therapeutic Exercise) left;dorsiflexion;plantarflexion;supine  -AG       Row Name             Wound 07/26/24 1046 medial sacral spine Pressure Injury    Wound - Properties Group Placement Date: 07/26/24  -YB Placement Time: 1046  -YB Present on Original Admission: Y  -TW Orientation: medial  -TW Location: sacral spine  -YB Primary Wound Type: Pressure inj  -TW    Retired Wound - Properties Group Placement Date: 07/26/24  -YB Placement Time: 1046  -YB Present on Original Admission: Y  -TW Orientation: medial  -TW Location: sacral spine  -YB Primary Wound Type: Pressure inj  -TW     "Retired Wound - Properties Group Date first assessed: 07/26/24  -YB Time first assessed: 1046  -YB Present on Original Admission: Y  -TW Location: sacral spine  -YB Primary Wound Type: Pressure inj  -TW      Row Name             Wound 08/09/24 1414 Right distal thigh Incision    Wound - Properties Group Placement Date: 08/09/24  -JG Placement Time: 1414  -JG Side: Right  -JG Orientation: distal  -JG Location: thigh  -JG Primary Wound Type: Incision  -TW    Retired Wound - Properties Group Placement Date: 08/09/24  -JG Placement Time: 1414  -JG Side: Right  -JG Orientation: distal  -JG Location: thigh  -JG Primary Wound Type: Incision  -TW    Retired Wound - Properties Group Date first assessed: 08/09/24  -JG Time first assessed: 1414  -JG Side: Right  -JG Location: thigh  -JG Primary Wound Type: Incision  -TW      Row Name 08/20/24 1138          Coping    Observed Emotional State calm;flat  -AG     Verbalized Emotional State sadness  -AG     Trust Relationship/Rapport care explained;choices provided;thoughts/feelings acknowledged  -AG     Family/Support Persons family  -AG     Involvement in Care not present at bedside  -     Family/Support System Care support provided  -AG       Row Name 08/20/24 1138          Plan of Care Review    Plan of Care Reviewed With patient  -AG     Outcome Evaluation PT treatment performed; pt. tolerated L LE PROM, did not make effort to perform AROM.  Pt. continues to require total A for all bed mobility.  Will continue to follow.  -AG       Row Name 08/20/24 1138          Positioning and Restraints    Pre-Treatment Position in bed  -AG     Post Treatment Position bed  -AG     In Bed supine;call light within reach;encouraged to call for assist;exit alarm on;side rails up x3;legs elevated  -AG       Row Name 08/20/24 1138          Therapy Assessment/Plan (PT)    Patient/Family Therapy Goals Statement (PT) \"go home\"  -AG               User Key  (r) = Recorded By, (t) = Taken By, (c) = " Cosigned By      Initials Name Provider Type    TW Lauren Centeno, RN Registered Nurse    Megan Jiménez, RN Registered Nurse    Anu Solorzano, PT Physical Therapist    Isela Newsome, RN Registered Nurse                    Physical Therapy Education       Title: PT OT SLP Therapies (In Progress)       Topic: Physical Therapy (In Progress)       Point: Mobility training (In Progress)       Learning Progress Summary             Patient Acceptance, E,D, NR by AG at 8/20/2024 1138    Acceptance, E,TB, NR by LT at 8/7/2024 0158    Acceptance, E,TB, VU by KT at 8/6/2024 1420                         Point: Home exercise program (In Progress)       Learning Progress Summary             Patient Acceptance, E,D, NR by  at 8/20/2024 1138    Acceptance, E,TB, NR by LT at 8/7/2024 0158    Acceptance, E,TB, VU by KT at 8/6/2024 1420                         Point: Body mechanics (In Progress)       Learning Progress Summary             Patient Acceptance, E,D, NR by  at 8/20/2024 1138    Acceptance, E,TB, NR by LT at 8/7/2024 0158    Acceptance, E,TB, VU by KT at 8/6/2024 1420                         Point: Precautions (In Progress)       Learning Progress Summary             Patient Acceptance, E,D, NR by  at 8/20/2024 1138    Acceptance, E,TB, NR by LT at 8/7/2024 0158    Acceptance, E,TB, VU by KT at 8/6/2024 1420                                         User Key       Initials Effective Dates Name Provider Type Discipline     06/16/21 -  Anu Chin, PT Physical Therapist PT    LT 05/30/24 -  Oneida Calvo, RN Registered Nurse Nurse    KT 06/21/24 -  Elmer Banks, MARIA C Student PT Student PT                  PT Recommendation and Plan  Anticipated Discharge Disposition (PT): skilled nursing facility  Plan of Care Reviewed With: patient  Outcome Evaluation: PT treatment performed; pt. tolerated L LE PROM, did not make effort to perform AROM.  Pt. continues to require total A for all bed mobility.   Will continue to follow.       Time Calculation:    PT Charges       Row Name 08/20/24 1137             Time Calculation    PT Received On 08/20/24  -                User Key  (r) = Recorded By, (t) = Taken By, (c) = Cosigned By      Initials Name Provider Type    Anu Solorzano, PT Physical Therapist                  Therapy Charges for Today       Code Description Service Date Service Provider Modifiers Qty    [id number] HC PT THER PROC EA 15 MIN 8/20/2024 Anu Chin, PT GP 1            PT G-Codes  AM-PAC 6 Clicks Score (PT): 6    Anu Chin, PT  8/20/2024

## 2024-08-20 NOTE — PROGRESS NOTES
[facility] HOSPITALIST PROGRESS NOTE     Patient Identification:  Name:  Reny Elena  Age:  64 y.o.  Sex:  female  :  1960  MRN:  [id number]  Visit Number:  [id number]  Primary Care Provider:  Susan Stephens APRN    Length of stay:  17    Chief complaint: None    Subjective:    Patient seen and examined at bedside with no nursing staff present. Patient was awake and staring at the wall when I entered the room. Patient appeared depressed, and made little eye contact during the exam. Patient does appear depressed and tearful, but states she currently has no complaints. Patient states she just wants to go home.  ----------------------------------------------------------------------------------------------------------------------  Current [facility] Meds:  atorvastatin, 80 mg, Oral, Nightly  cholecalciferol, 50,000 Units, Oral, Q7 Days  ferrous sulfate, 325 mg, Oral, Daily With Breakfast  FLUoxetine, 10 mg, Oral, Daily  folic acid, 1 mg, Oral, Daily  gentamicin, 1 Application, Topical, Daily  insulin glargine, 10 Units, Subcutaneous, Daily  insulin lispro, 2-9 Units, Subcutaneous, 4x Daily AC & at Bedtime  lacosamide, 50 mg, Oral, Q12H  lansoprazole, 30 mg, Oral, Q AM  levothyroxine, 100 mcg, Oral, QAM AC  magic barrier cream, , Topical, BID  metoprolol tartrate, 12.5 mg, Oral, Q12H  potassium chloride, 40 mEq, Oral, Daily  sodium chloride, 10 mL, Intravenous, Q12H  sodium chloride, 10 mL, Intravenous, Q12H  sodium chloride, 10 mL, Intravenous, Q12H  sodium chloride, 10 mL, Intravenous, Q12H  sodium chloride, 10 mL, Intravenous, Q12H  traZODone, 25 mg, Oral, Nightly         ----------------------------------------------------------------------------------------------------------------------  Vital Signs:  Temp:  [98 °F (36.7 °C)-98.9 °F (37.2 °C)] 98.9 °F (37.2 °C)  Heart Rate:  [63-76] 76  Resp:  [16-18] 16  BP: ()/(52-97) 139/68      24  0600 24  0500 24  0600    Weight: 48.1 kg (106 lb 0.7 oz) 45.1 kg (99 lb 6.4 oz) (Nik Piña) 42.7 kg (94 lb 3.2 oz)     Body mass index is 16.17 kg/m².    Intake/Output Summary (Last 24 hours) at 8/20/2024 1806  Last data filed at 8/20/2024 1700  Gross per 24 hour   Intake 1200 ml   Output 1276 ml   Net -76 ml     Diet: Regular/House; Feeding Assistance - Nursing; Texture: Pureed (NDD 1); Fluid Consistency: Thin (IDDSI 0)  ----------------------------------------------------------------------------------------------------------------------  Physical exam:  Constitutional: Chronically ill-appearing  female in no apparent distress.     HENT:  Head:  Normocephalic and atraumatic.  Mouth:  Moist mucous membranes.    Eyes:  Conjunctivae and EOM are normal.  Pupils are equal, round, and reactive to light.  No scleral icterus.    Neck:  Neck supple. No thyromegaly.  No JVD present.    Cardiovascular:  Regular rate and rhythm with no murmurs, rubs, clicks or gallops appreciated.  Pulmonary/Chest:  Clear to auscultation bilaterally with no crackles, wheezes or rhonchi appreciated.  Abdominal:  Soft. Nontender. Nondistended, peritoneal dialysis catheter in place.  Bowel sounds are normal in all four quadrants. No organomegally appreciated.   Musculoskeletal: Patient with diffuse anasarca, no tenderness, and no deformity.  No red or swollen joints anywhere.    Neurological:  Alert, Cranial nerves II-XII intact with no focal deficits.  No facial droop.  No slurred speech.   Skin:  Warm and dry to palpation with no rashes or lesions appreciated.  Peripheral vascular:  2+ radial and pedal pulses in bilateral upper and lower extremities.  Psychiatric:  Alert and oriented x3, demonstrates appropriate judgment and insight.    Little change in physical exam in comparison to  8/19/2024  ------------------------------------------------------------------  ----------------------------------------------------------------------------------------------------------------------      Results from last 7 days   Lab Units 08/20/24  0421 08/19/24  0153 08/18/24  1553 08/15/24  1116 08/15/24  0901 08/14/24  2314 08/14/24  1521 08/14/24  0702 08/13/24  2329 08/13/24  2154   LACTATE mmol/L  --   --   --   --   --   --   --   --   --  1.5   WBC 10*3/mm3 3.89 4.27 4.33   < > 3.97 4.31  --  5.70   < >  --    HEMOGLOBIN g/dL 11.1* 12.1 11.4*   < > 11.4* 6.3*  --  9.2*   < >  --    HEMATOCRIT % 31.8* 34.9 33.3*   < > 33.6* 18.9*  --  26.8*   < >  --    MCV fL 84.8 84.3 85.6   < > 85.1 93.1  --  92.7   < >  --    MCHC g/dL 34.9 34.7 34.2   < > 33.9 33.3  --  34.3   < >  --    PLATELETS 10*3/mm3 35* 32* 36*   < > 61* 79*  --  104*   < >  --    INR   --   --   --   --  0.88* 1.13* 1.20* 1.46*  --  2.13*    < > = values in this interval not displayed.     Results from last 7 days   Lab Units 08/14/24  0503   PH, ARTERIAL pH units 7.411   PO2 ART mm Hg 72.0*   PCO2, ARTERIAL mm Hg 34.6*   HCO3 ART mmol/L 21.9     Results from last 7 days   Lab Units 08/20/24  0421 08/19/24  1324 08/19/24  0153 08/18/24  1553 08/16/24  0646 08/16/24  0028 08/15/24  0901   SODIUM mmol/L 136  --  138 137   < > 137 137   POTASSIUM mmol/L 2.8* 2.9* 2.7* 2.7*   < > 3.3* 3.1*   MAGNESIUM mg/dL  --   --  2.0  --   --  2.2 1.3*   CHLORIDE mmol/L 101  --  102 102   < > 99 98   CO2 mmol/L 24.6  --  23.5 22.1   < > 22.0 20.9*   BUN mg/dL 40*  --  37* 34*   < > 25* 23   CREATININE mg/dL 2.80*  --  2.80* 2.83*   < > 2.61* 2.54*   CALCIUM mg/dL 6.9*  --  7.0* 6.8*   < > 7.2* 7.4*   GLUCOSE mg/dL 112*  --  188* 84   < > 216* 163*   ALBUMIN g/dL 1.9*  --  2.1*  --   --  2.5* 2.9*   BILIRUBIN mg/dL 0.4  --  0.5  --   --  0.6 1.2   ALK PHOS U/L 372*  --  488*  --   --  496* 422*   AST (SGOT) U/L 53*  --  77*  --   --  258* 400*   ALT (SGPT) U/L 25  " --  26  --   --  30 32    < > = values in this interval not displayed.   Estimated Creatinine Clearance: 13.3 mL/min (A) (by C-G formula based on SCr of 2.8 mg/dL (H)).    No results found for: \"AMMONIA\"      Blood Culture   Date Value Ref Range Status   08/12/2024 No growth at 5 days  Final     No results found for: \"URINECX\"  No results found for: \"WOUNDCX\"  No results found for: \"STOOLCX\"    I have personally looked at the labs and they are summarized above.  ----------------------------------------------------------------------------------------------------------------------  Imaging Results (Last 24 Hours)       ** No results found for the last 24 hours. **          ----------------------------------------------------------------------------------------------------------------------  Assessment and Plan:    Septic shock (present on admission, resolved) -likely multifactorial from right lower extremity/right ankle osteomyelitis in combination with volume shifts from ESRD and severe hypoalbuminemia from severe protein calorie malnutrition.    2.  Acute ischemic hepatitis -slowly improving, will repeat CMP in the morning    3.  ESRD on PD -appreciate nephrology recommendations, continue PD per their recommendations.    4.  Severe malnutrition -appreciate nutrition recommendations, will continue Novasource renal shakes on a regular basis.  Continue to encourage oral intake    5.  Right ankle osteomyelitis - s/p right BKA, currently doing well, patient has completed full course of antibiotic therapy with vancomycin and Zosyn.  Continue to monitor off antibiotic therapy.    6.  Chronic debility    7.  Type 2 diabetes mellitus -continue Accu-Cheks before every meal and nightly with sliding scale insulin    8.  Metabolic encephalopathy -resolved    9.  Acute blood loss anemia -hemoglobin now stable.  Continue to monitor closely and transfuse for hemoglobin less than 7    10.  Thrombocytopenia -HIT panel currently " pending, no active bleeding, no need for platelet transfusion at this time.  Will transfuse for platelet count less than 10,000 or if actively bleeding less than 50,000.    Disposition: Will likely be discharged in the next 24 hours    Narayan Mayorga DO   08/20/24   18:06 EDT

## 2024-08-21 ENCOUNTER — READMISSION MANAGEMENT (OUTPATIENT)
Dept: CALL CENTER | Facility: HOSPITAL | Age: 64
End: 2024-08-21
Payer: MEDICARE

## 2024-08-21 VITALS
HEART RATE: 71 BPM | HEIGHT: 64 IN | WEIGHT: 100.4 LBS | TEMPERATURE: 98.3 F | OXYGEN SATURATION: 96 % | BODY MASS INDEX: 17.14 KG/M2 | RESPIRATION RATE: 18 BRPM | SYSTOLIC BLOOD PRESSURE: 155 MMHG | DIASTOLIC BLOOD PRESSURE: 70 MMHG

## 2024-08-21 PROBLEM — E11.621 DIABETIC ULCER OF RIGHT HEEL: Status: RESOLVED | Noted: 2024-08-03 | Resolved: 2024-08-21

## 2024-08-21 PROBLEM — L97.419 DIABETIC ULCER OF RIGHT HEEL: Status: RESOLVED | Noted: 2024-08-03 | Resolved: 2024-08-21

## 2024-08-21 PROBLEM — F41.9 ANXIETY: Status: ACTIVE | Noted: 2024-08-21

## 2024-08-21 LAB
ANISOCYTOSIS BLD QL: NORMAL
BASOPHILS # BLD AUTO: 0 10*3/MM3 (ref 0–0.2)
BASOPHILS NFR BLD AUTO: 0 % (ref 0–1.5)
DACRYOCYTES BLD QL SMEAR: NORMAL
DEPRECATED RDW RBC AUTO: 54.5 FL (ref 37–54)
EOSINOPHIL # BLD AUTO: 0.09 10*3/MM3 (ref 0–0.4)
EOSINOPHIL NFR BLD AUTO: 2.6 % (ref 0.3–6.2)
ERYTHROCYTE [DISTWIDTH] IN BLOOD BY AUTOMATED COUNT: 18 % (ref 12.3–15.4)
GLUCOSE BLDC GLUCOMTR-MCNC: 158 MG/DL (ref 70–130)
GLUCOSE BLDC GLUCOMTR-MCNC: 205 MG/DL (ref 70–130)
GLUCOSE BLDC GLUCOMTR-MCNC: 254 MG/DL (ref 70–130)
HCT VFR BLD AUTO: 28.2 % (ref 34–46.6)
HGB BLD-MCNC: 9.5 G/DL (ref 12–15.9)
IMM GRANULOCYTES # BLD AUTO: 0.01 10*3/MM3 (ref 0–0.05)
IMM GRANULOCYTES NFR BLD AUTO: 0.3 % (ref 0–0.5)
LYMPHOCYTES # BLD AUTO: 0.75 10*3/MM3 (ref 0.7–3.1)
LYMPHOCYTES NFR BLD AUTO: 21.6 % (ref 19.6–45.3)
MCH RBC QN AUTO: 29.3 PG (ref 26.6–33)
MCHC RBC AUTO-ENTMCNC: 33.7 G/DL (ref 31.5–35.7)
MCV RBC AUTO: 87 FL (ref 79–97)
MONOCYTES # BLD AUTO: 0.43 10*3/MM3 (ref 0.1–0.9)
MONOCYTES NFR BLD AUTO: 12.4 % (ref 5–12)
NEUTROPHILS NFR BLD AUTO: 2.19 10*3/MM3 (ref 1.7–7)
NEUTROPHILS NFR BLD AUTO: 63.1 % (ref 42.7–76)
NRBC BLD AUTO-RTO: 0 /100 WBC (ref 0–0.2)
PLATELET # BLD AUTO: 38 10*3/MM3 (ref 140–450)
PMV BLD AUTO: 11.7 FL (ref 6–12)
RBC # BLD AUTO: 3.24 10*6/MM3 (ref 3.77–5.28)
SMALL PLATELETS BLD QL SMEAR: NORMAL
WBC NRBC COR # BLD AUTO: 3.47 10*3/MM3 (ref 3.4–10.8)

## 2024-08-21 PROCEDURE — 63710000001 INSULIN GLARGINE PER 5 UNITS: Performed by: FAMILY MEDICINE

## 2024-08-21 PROCEDURE — 99239 HOSP IP/OBS DSCHRG MGMT >30: CPT | Performed by: INTERNAL MEDICINE

## 2024-08-21 PROCEDURE — 82948 REAGENT STRIP/BLOOD GLUCOSE: CPT

## 2024-08-21 PROCEDURE — 63710000001 INSULIN LISPRO (HUMAN) PER 5 UNITS: Performed by: FAMILY MEDICINE

## 2024-08-21 PROCEDURE — 85007 BL SMEAR W/DIFF WBC COUNT: CPT | Performed by: FAMILY MEDICINE

## 2024-08-21 PROCEDURE — 85025 COMPLETE CBC W/AUTO DIFF WBC: CPT | Performed by: FAMILY MEDICINE

## 2024-08-21 RX ORDER — OXYCODONE AND ACETAMINOPHEN 7.5; 325 MG/1; MG/1
1 TABLET ORAL EVERY 6 HOURS PRN
Qty: 30 TABLET | Refills: 0 | Status: SHIPPED | OUTPATIENT
Start: 2024-08-21

## 2024-08-21 RX ORDER — FLUOXETINE 10 MG/1
10 CAPSULE ORAL DAILY
Qty: 30 CAPSULE | Refills: 1 | Status: SHIPPED | OUTPATIENT
Start: 2024-08-22

## 2024-08-21 RX ORDER — METOPROLOL TARTRATE 25 MG/1
12.5 TABLET, FILM COATED ORAL EVERY 12 HOURS SCHEDULED
Qty: 30 TABLET | Refills: 1 | Status: SHIPPED | OUTPATIENT
Start: 2024-08-21

## 2024-08-21 RX ORDER — LORAZEPAM 0.5 MG/1
0.25 TABLET ORAL 2 TIMES DAILY PRN
Qty: 3 TABLET | Refills: 0 | Status: SHIPPED | OUTPATIENT
Start: 2024-08-21 | End: 2024-08-27

## 2024-08-21 RX ORDER — LORAZEPAM 0.5 MG/1
0.25 TABLET ORAL 2 TIMES DAILY PRN
Qty: 3 TABLET | Refills: 0 | Status: SHIPPED | OUTPATIENT
Start: 2024-08-21 | End: 2024-08-21

## 2024-08-21 RX ADMIN — Medication 10 ML: at 09:13

## 2024-08-21 RX ADMIN — FLUOXETINE HYDROCHLORIDE 10 MG: 10 CAPSULE ORAL at 09:09

## 2024-08-21 RX ADMIN — INSULIN LISPRO 4 UNITS: 100 INJECTION, SOLUTION INTRAVENOUS; SUBCUTANEOUS at 17:08

## 2024-08-21 RX ADMIN — LEVOTHYROXINE SODIUM 100 MCG: 0.1 TABLET ORAL at 09:09

## 2024-08-21 RX ADMIN — FOLIC ACID 1 MG: 1 TABLET ORAL at 09:10

## 2024-08-21 RX ADMIN — POTASSIUM CHLORIDE 40 MEQ: 1.5 POWDER, FOR SOLUTION ORAL at 09:11

## 2024-08-21 RX ADMIN — METOPROLOL TARTRATE 12.5 MG: 25 TABLET, FILM COATED ORAL at 09:09

## 2024-08-21 RX ADMIN — LACOSAMIDE 50 MG: 50 TABLET, FILM COATED ORAL at 09:12

## 2024-08-21 RX ADMIN — VITAMINS A AND D OINTMENT: 15.5; 53.4 OINTMENT TOPICAL at 09:12

## 2024-08-21 RX ADMIN — LANSOPRAZOLE 30 MG: 30 TABLET, ORALLY DISINTEGRATING ORAL at 05:47

## 2024-08-21 RX ADMIN — INSULIN LISPRO 6 UNITS: 100 INJECTION, SOLUTION INTRAVENOUS; SUBCUTANEOUS at 11:46

## 2024-08-21 RX ADMIN — Medication 10 ML: at 09:11

## 2024-08-21 RX ADMIN — FERROUS SULFATE TAB 325 MG (65 MG ELEMENTAL FE) 325 MG: 325 (65 FE) TAB at 09:09

## 2024-08-21 RX ADMIN — OXYCODONE HYDROCHLORIDE 5 MG: 5 TABLET ORAL at 09:26

## 2024-08-21 RX ADMIN — INSULIN LISPRO 2 UNITS: 100 INJECTION, SOLUTION INTRAVENOUS; SUBCUTANEOUS at 09:10

## 2024-08-21 RX ADMIN — INSULIN GLARGINE 10 UNITS: 100 INJECTION, SOLUTION SUBCUTANEOUS at 09:10

## 2024-08-21 NOTE — PLAN OF CARE
Goal Outcome Evaluation:  Plan of Care Reviewed With: patient        Progress: no change  Outcome Evaluation: Pt's d/c home with hospice, awaiting transportation.

## 2024-08-21 NOTE — PROGRESS NOTES
"Palliative Care Daily Progress Note     S: Medical record reviewed, followed up with Primary RN and Dr Mayorga regarding patient's condition. When I saw Meg this morning she was resting quietly, she barely woke and was very weak and would only speak a word or two. I told her to just go back to resting, she was not in distress at this time.      O:   Palliative Performance Scale Score:     /70 (BP Location: Right arm, Patient Position: Lying)   Pulse 71   Temp 98.3 °F (36.8 °C) (Oral)   Resp 18   Ht 162.6 cm (64\")   Wt 45.5 kg (100 lb 6.4 oz)   LMP 05/26/2005   SpO2 96%   BMI 17.23 kg/m²     Intake/Output Summary (Last 24 hours) at 8/21/2024 1824  Last data filed at 8/21/2024 1300  Gross per 24 hour   Intake 840 ml   Output --   Net 840 ml       PE:  Reny is now comfort measures, she was resting in bed quietly, she did not appear to be in pain or distress otherwise and did not c/o any symptoms      Meds: Reviewed and changes noted    Labs:   Results from last 7 days   Lab Units 08/21/24  0328   WBC 10*3/mm3 3.47   HEMOGLOBIN g/dL 9.5*   HEMATOCRIT % 28.2*   PLATELETS 10*3/mm3 38*     Results from last 7 days   Lab Units 08/20/24  0421   SODIUM mmol/L 136   POTASSIUM mmol/L 2.8*   CHLORIDE mmol/L 101   CO2 mmol/L 24.6   BUN mg/dL 40*   CREATININE mg/dL 2.80*   GLUCOSE mg/dL 112*   CALCIUM mg/dL 6.9*     Results from last 7 days   Lab Units 08/20/24  0421   SODIUM mmol/L 136   POTASSIUM mmol/L 2.8*   CHLORIDE mmol/L 101   CO2 mmol/L 24.6   BUN mg/dL 40*   CREATININE mg/dL 2.80*   CALCIUM mg/dL 6.9*   BILIRUBIN mg/dL 0.4   ALK PHOS U/L 372*   ALT (SGPT) U/L 25   AST (SGOT) U/L 53*   GLUCOSE mg/dL 112*     Imaging Results (Last 72 Hours)       ** No results found for the last 72 hours. **              Diagnostics: Reviewed    A: Reny Elena is a 64 y.o. female  admitted on 8/2/2024 due to nausea, vomiting, with right foot pain and fever for a week before presenting to the ER. Reny has a medical " history of arthritis, insulin dependent type II DM, chronic diastolic CHF, hypothyroidism, seizure disorder, HTN, HLD, GERD, iron deficiency anemia, PAD, and ESRD on PD, Reny was given an antibiotic at prior ER visit on 7/21/24 due to a UTI and she stated after a couple of days of antibiotic she began to experience nausea and vomiting, apparently she stopped taking the antibiotic and symptoms continued to persist. This visit in the ED Reny did complain of increased rt foot pain. Reny was tachycardic. 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 Pulmonary, Infectious disease, and Nephrology are all consulted on Ms Elena.       P:  Today I was able to have a noce conversation with Cliff her SO and HCS to discuss reny's GOC and her progressive decline over time since we have known her but even this admission she has declined, becoming more weak, with little to no appetite and overall appears like she has given up. Cliff agrees that she just does not seem happy and always seems uncomfortable. I talked about comfort measures as well as hospice services at home and after discussion Sukh has decided that he would like hospice services as they could help him with her symptom management. I changed Meg to comfort measures put hospice referral in and Spoke with Dr Mayorga about prn management for home.    We will continue to follow along. Please do not hesitate to contact us regarding further sx mgmt or GOC needs, including after hours or on weekends via our on call provider at [phone].     Jennifer Sanders, APRN    8/21/2024

## 2024-08-21 NOTE — PLAN OF CARE
Goal Outcome Evaluation:  Plan of Care Reviewed With: patient        Progress: no change  Outcome Evaluation: Pt has rested in bed overnight. VSS. Pain well controlled with PRN meds. Wound care completed per orders. Will continue POC.

## 2024-08-21 NOTE — PROGRESS NOTES
"Nephrology Progress Note      Subjective   Patient feels much better, no chest pain or shortness of breath.  Her intake has been significantly improving.  Objective       Vital signs :     Temp:  [98.4 °F (36.9 °C)-98.9 °F (37.2 °C)] 98.4 °F (36.9 °C)  Heart Rate:  [68-77] 68  Resp:  [16-18] 18  BP: (133-178)/(52-73) 178/73    Intake/Output                         08/19/24 0701 - 08/20/24 0700 08/20/24 0701 - 08/21/24 0700     [id number] 2981-3241 Total 0369-2149 [id number] Total                 Intake    P.O.  1320  240 1560  960  240 1200    I.V.  --  0 0  --  0 0    Total Intake 2838 596 7968        Output    Dialysis  --  1276 1276  --  -- --    Total Output -- 1276 1276 -- -- --             Physical Exam:    General Appearance : Lying on bed comfortably  Lungs : Bilateral decreased intensity of breath sounds, bibasilar crackles  Heart :  regular rhythm & normal rate, normal S1, S2 and no murmur, no rub  Abdomen : Soft, nondistended PD catheter site clear  Extremities : Bilateral 2+ upper extremity edema  Neurologic :   Unable to assess      Laboratory Data :     Albumin Albumin   Date Value Ref Range Status   08/20/2024 1.9 (L) 3.5 - 5.2 g/dL Final   08/19/2024 2.1 (L) 3.5 - 5.2 g/dL Final        Magnesium Magnesium   Date Value Ref Range Status   08/19/2024 2.0 1.6 - 2.4 mg/dL Final            PTH               No results found for: \"PTH\"    CBC and coagulation:  Results from last 7 days   Lab Units 08/21/24  0328 08/20/24  0421 08/19/24  0153 08/15/24  1116 08/15/24  0901 08/14/24  2314 08/14/24  2314 08/14/24  1521   WBC 10*3/mm3 3.47 3.89 4.27   < > 3.97   < > 4.31  --    HEMOGLOBIN g/dL 9.5* 11.1* 12.1   < > 11.4*   < > 6.3*  --    HEMATOCRIT % 28.2* 31.8* 34.9   < > 33.6*   < > 18.9*  --    MCV fL 87.0 84.8 84.3   < > 85.1   < > 93.1  --    MCHC g/dL 33.7 34.9 34.7   < > 33.9   < > 33.3  --    PLATELETS 10*3/mm3 38* 35* 32*   < > 61*   < > 79*  --    INR   --   --   --   --  0.88*  --  1.13* " 1.20*    < > = values in this interval not displayed.     Acid/base balance:        Renal and electrolytes:    Results from last 7 days   Lab Units 08/20/24  0421 08/19/24  1324 08/19/24  0153 08/18/24  1554 08/18/24  1553 08/17/24  0708 08/17/24  0029 08/16/24  0646 08/16/24  0028 08/15/24  0901 08/15/24  0901 08/14/24  2314   SODIUM mmol/L 136  --  138  --  137 134* 138   < > 137   < > 137  --    POTASSIUM mmol/L 2.8* 2.9* 2.7*  --  2.7* 3.4* 2.3*   < > 3.3*   < > 3.1*  --    MAGNESIUM mg/dL  --   --  2.0  --   --   --   --   --  2.2  --  1.3*  --    CHLORIDE mmol/L 101  --  102  --  102 101 102   < > 99   < > 98  --    CO2 mmol/L 24.6  --  23.5  --  22.1 21.4* 21.5*   < > 22.0   < > 20.9*  --    BUN mg/dL 40*  --  37*  --  34* 28* 27*   < > 25*   < > 23  --    CREATININE mg/dL 2.80*  --  2.80*  --  2.83* 2.55* 2.41*   < > 2.61*   < > 2.54*  --    CALCIUM mg/dL 6.9*  --  7.0*  --  6.8* 7.0* 7.0*   < > 7.2*   < > 7.4*  --    IONIZED CALCIUM mmol/L  --   --   --  0.81*  --   --   --   --   --   --   --  0.95*   PHOSPHORUS mg/dL  --   --   --   --   --   --   --   --  2.6  --  3.4  --     < > = values in this interval not displayed.     Estimated Creatinine Clearance: 14.2 mL/min (A) (by C-G formula based on SCr of 2.8 mg/dL (H)).  @GFRCG:3@   Liver and pancreatic function:  Results from last 7 days   Lab Units 08/20/24  0421 08/19/24  0153 08/16/24  0028   ALBUMIN g/dL 1.9* 2.1* 2.5*   BILIRUBIN mg/dL 0.4 0.5 0.6   ALK PHOS U/L 372* 488* 496*   AST (SGOT) U/L 53* 77* 258*   ALT (SGPT) U/L 25 26 30         Cardiac:      Liver and pancreatic function:  Results from last 7 days   Lab Units 08/20/24  0421 08/19/24  0153 08/16/24  0028   ALBUMIN g/dL 1.9* 2.1* 2.5*   BILIRUBIN mg/dL 0.4 0.5 0.6   ALK PHOS U/L 372* 488* 496*   AST (SGOT) U/L 53* 77* 258*   ALT (SGPT) U/L 25 26 30       Medications :     atorvastatin, 80 mg, Oral, Nightly  cholecalciferol, 50,000 Units, Oral, Q7 Days  ferrous sulfate, 325 mg, Oral, Daily  With Breakfast  FLUoxetine, 10 mg, Oral, Daily  folic acid, 1 mg, Oral, Daily  gentamicin, 1 Application, Topical, Daily  insulin glargine, 10 Units, Subcutaneous, Daily  insulin lispro, 2-9 Units, Subcutaneous, 4x Daily AC & at Bedtime  lacosamide, 50 mg, Oral, Q12H  lansoprazole, 30 mg, Oral, Q AM  levothyroxine, 100 mcg, Oral, QAM AC  magic barrier cream, , Topical, BID  metoprolol tartrate, 12.5 mg, Oral, Q12H  potassium chloride, 40 mEq, Oral, Daily  sodium chloride, 10 mL, Intravenous, Q12H  sodium chloride, 10 mL, Intravenous, Q12H  sodium chloride, 10 mL, Intravenous, Q12H  sodium chloride, 10 mL, Intravenous, Q12H  sodium chloride, 10 mL, Intravenous, Q12H  traZODone, 25 mg, Oral, Nightly                 Assessment & Plan       -End-stage renal disease on peritoneal dialysis  -Hypotension  -Anemia  -Hypokalemia  -Type 2 diabetes mellitus with renal involvement  - Persistent nausea and vomiting  - Cachexia and malnutrition    Reviewed PD data, excellent ultrafiltration response with current CCPD regimen.    Acute severe hypokalemia, as needed potassium replacement  Metabolic acidosis, monitor      Discussed with RN    Please avoid nephrotoxic medication and pharmacy to adjust the medication according to the GFR.      Nicholas Arroyo MD  08/21/24  08:11 EDT

## 2024-08-21 NOTE — DISCHARGE PLACEMENT REQUEST
"Reny Merino (64 y.o. Female)       Date of Birth   1960    Social Security Number       Address   [address] [address]    Home Phone   [phone]    MRN   [id number]       Nondenominational   Hindu    Marital Status                               Admission Date   8/2/24    Admission Type   Emergency    Admitting Provider   Yandel Ortega DO    Attending Provider   Narayan Mayorga DO    Department, Room/Bed   [address], [facility]/       Discharge Date       Discharge Disposition   Home or Self Care    Discharge Destination                                 Attending Provider: Narayan Mayorga DO    Allergies: Morphine, Penicillins, Codeine, Sulfa Antibiotics    Isolation: None   Infection: None   Code Status: No CPR    Ht: 162.6 cm (64\")   Wt: 45.5 kg (100 lb 6.4 oz)    Admission Cmt: None   Principal Problem: Diabetic ulcer of right heel [E11.621,L97.419]                   Active Insurance as of 8/2/2024       Primary Coverage       Payor Plan Insurance Group Employer/Plan Group    MEDICARE MEDICARE B ONLY        Payor Plan Address Payor Plan Phone Number Payor Plan Fax Number Effective Dates    [address] [address] [phone]  3/1/2023 - None Entered    [facility] [id number]         Subscriber Name Subscriber Birth Date Member ID       RENY MERINO 1960 [id number]               Secondary Coverage       Payor Plan Insurance Group Employer/Plan Group    [place]        Payor Plan Address Payor Plan Phone Number Payor Plan Fax Number Effective Dates    [address] [phone]  11/7/2022 - None Entered    [facility] [id number]         Subscriber Name Subscriber Birth Date Member ID       RENY MERINO 1960 [id number]                     Emergency Contacts        (Rel.) Home Phone Work Phone Mobile Phone    Abbie ([facility]Cliff (Significant Other) [phone] -- [phone]    Liza Oliva " (Daughter) [phone] -- [phone]                 History & Physical        Erwin Tatum MD at 24 0316          Hospitalist History and Physical        Patient Identification  Name: Reny Elena  Age/Sex: 64 y.o. female  :  1960        MRN: [id number]  Visit Number: [id number]  Admit Date: 2024   PCP: Susan Stephens APRN          Chief complaint nausea, vomiting    History of Present Illness:  Patient is a 64 y.o. female with history of arthritis, insulin dependent type II DM, chronic diastolic CHF, hypothyroidism, seizure disorder, HTN, HLD, GERD, iron deficiency anemia, PAD, and ESRD on PD, who presents with complaints of nausea and vomiting over the last week. Of note, on 24 she presented to our ED complaining of hallucinations as well as right foot pain and fever. She was noted to have a right heel ulcer and a UTI based on abnormal UA. At that time she admits she was having some dysuria as well. She was sent home with a prescription for an antibiotic (unclear what exactly) and referred to wound care with whom she followed up today. She reports after taking the unknown antibiotic for a few days, she developed nausea and vomiting which has persisted despite stopping the antibiotic when symptoms first started. She denies abdominal pain. She denies any further dysuria. She does report increased pain in the right heel, however. In the ED, patient was tachycardic but other vitals were stable. Labs show K+ at upper limit of normal, BUN 30, cr 4.36, glucose 176, alk phos 323 and albumin 2.9, otherwise normal LFTs. CRP mildly elevated at 2.34, while lactate and WBC are normal. UA shows large leuk esterase and unable to determine RBC, WBC, bacteria of squamous cells due to loaded field. Order for urine to be sent for culture is pending, but urine culture from 24 grew E coli. Wound culture from that same visit grew moderate mixed gram negative maurisio along with light growth  enterococcus faecalis. XR right foot shows diffuse soft tissue swelling. CT abd/pelvis showes possible mild distal colitis. Patient Responded well to zofran earlier during her ED stay, but now is vomiting again. She is receiving a 2nd dose of zofran from the ED provider now and being admitted for further management.     Review of Systems  Review of Systems   Constitutional:  Positive for appetite change. Negative for chills and fever.   HENT:  Negative for postnasal drip, rhinorrhea, sinus pressure and sinus pain.    Respiratory:  Negative for cough, shortness of breath and wheezing.    Cardiovascular:  Negative for chest pain, palpitations and leg swelling.   Gastrointestinal:  Positive for nausea and vomiting. Negative for abdominal distention, abdominal pain, constipation and diarrhea.   Endocrine: Negative for polyphagia and polyuria.   Genitourinary:  Positive for dysuria. Negative for difficulty urinating, flank pain, frequency and hematuria.   Musculoskeletal:         Right heel pain   Skin:  Positive for wound (ulceration right heel).   Neurological:  Positive for weakness (generalized) and numbness. Negative for dizziness, tremors, seizures, syncope, light-headedness and headaches.   Hematological:  Negative for adenopathy. Does not bruise/bleed easily.   Psychiatric/Behavioral:  Negative for agitation, behavioral problems and confusion.        History  Past Medical History:   Diagnosis Date    Arthritis     Closed fracture of right fibula and tibia 12/25/2018    Depression     Diabetic ulcer of left foot associated with type 2 diabetes mellitus 6/23/2017    Diastolic dysfunction     Disease of thyroid gland     Elevated cholesterol     End stage renal disease     Essential hypertension     GERD (gastroesophageal reflux disease)     History of transfusion     Hyperlipidemia     Iron deficiency anemia 12/30/2018    PAD (peripheral artery disease)     Renal failure     Type 2 diabetes mellitus with  hyperglycemia, with long-term current use of insulin 2018     Past Surgical History:   Procedure Laterality Date    ABDOMINAL SURGERY      BACK SURGERY      Post spinal diskectomy, osteophytectomy in one lumbar interspace    CATARACT EXTRACTION      Left      SECTION      DENTAL PROCEDURE      ENDOSCOPY      FRACTURE SURGERY      right leg    HYSTERECTOMY      INCISION AND DRAINAGE LEG Left 4/15/2017    Procedure: INCISION AND DRAINAGE LOWER EXTREMITY;  Surgeon: Basilio Morris MD;  Location: [facility] OR;  Service:     INCISION AND DRAINAGE LEG Left 2017    Procedure: Irrigation and Debridment abcess diabetic wound left foot with deep culture;  Surgeon: Basilio Morris MD;  Location: [facility] OR;  Service:     INSERTION PERITONEAL DIALYSIS CATHETER N/A 2021    Procedure: INSERTION PERITONEAL DIALYSIS CATHETER LAPAROSCOPIC;  Surgeon: Edy Glover MD;  Location: [facility] OR;  Service: General;  Laterality: N/A;    KNEE ARTHROSCOPY Right     ORIF TIBIA FRACTURE Right 2018    Procedure: TIBIAL  FRACTURE OPEN REDUCTION INTERNAL FIXATION;  Surgeon: Jung Barragan MD;  Location: [facility] OR;  Service: Orthopedics    TOE AMPUTATION Right     5th    TUBAL ABDOMINAL LIGATION       Family History   Problem Relation Age of Onset    Heart disease Mother     Cancer Mother     COPD Mother     Diabetes Other     Depression Other      Social History     Tobacco Use    Smoking status: Never     Passive exposure: Never    Smokeless tobacco: Never   Vaping Use    Vaping status: Never Used   Substance Use Topics    Alcohol use: No    Drug use: No     (Not in a hospital admission)    Allergies:  Penicillins, Codeine, and Sulfa antibiotics    Objective    Vital Signs  Temp:  [97.5 °F (36.4 °C)] 97.5 °F (36.4 °C)  Heart Rate:  [] 92  Resp:  [14] 14  BP: (108-154)/(55-89) 135/78  Body mass index is 16.64 kg/m².    Physical Exam:  Physical Exam  Constitutional:       Comments: Frail appearing 63 yo female,  looks older than stated age, acutely and chronically ill. No acute distress.    HENT:      Head: Normocephalic and atraumatic.      Right Ear: External ear normal.      Left Ear: External ear normal.      Nose: Nose normal.      Mouth/Throat:      Mouth: Mucous membranes are dry.      Pharynx: Oropharynx is clear.   Eyes:      Extraocular Movements: Extraocular movements intact.      Conjunctiva/sclera: Conjunctivae normal.      Pupils: Pupils are equal, round, and reactive to light.   Cardiovascular:      Rate and Rhythm: Regular rhythm. Tachycardia present.      Pulses: Normal pulses.      Heart sounds: Normal heart sounds. No murmur heard.  Pulmonary:      Effort: Pulmonary effort is normal. No respiratory distress.      Breath sounds: Normal breath sounds. No wheezing or rales.   Abdominal:      General: Abdomen is flat. Bowel sounds are normal. There is no distension.      Palpations: Abdomen is soft.      Tenderness: There is no abdominal tenderness.      Comments: PD cathter noted in upper abdomen; insertion site appears dry, clean, intact. Diasylate fluid does not appear cloudy or bloody.    Musculoskeletal:         General: Normal range of motion.      Cervical back: Normal range of motion and neck supple. No tenderness.      Right lower leg: No edema.      Left lower leg: No edema.      Comments: Faint right hand edema compared to left.    Lymphadenopathy:      Cervical: No cervical adenopathy.   Skin:     General: Skin is warm and dry.      Findings: Lesion (right heel, with some surrounding eschar as well as slough. Erythema of surrounding skin noted. Very foul smelling.) present.   Neurological:      General: No focal deficit present.      Mental Status: She is oriented to person, place, and time.   Psychiatric:         Mood and Affect: Mood normal.         Behavior: Behavior normal.           Results Review:       Lab Results:  Results from last 7 days   Lab Units 08/02/24 1916   WBC 10*3/mm3 9.44  "  HEMOGLOBIN g/dL 13.0   PLATELETS 10*3/mm3 251     Results from last 7 days   Lab Units 08/02/24 1916   CRP mg/dL 2.34*     Results from last 7 days   Lab Units 08/02/24 1916   SODIUM mmol/L 134*   POTASSIUM mmol/L 5.1   CHLORIDE mmol/L 94*   CO2 mmol/L 24.6   BUN mg/dL 30*   CREATININE mg/dL 4.36*   CALCIUM mg/dL 7.6*   GLUCOSE mg/dL 176*         No results found for: \"HGBA1C\"  Results from last 7 days   Lab Units 08/02/24 1916   BILIRUBIN mg/dL 0.4   ALK PHOS U/L 323*   AST (SGOT) U/L 18   ALT (SGPT) U/L 11                       I have reviewed the patient's laboratory results.    Imaging:  Imaging Results (Last 72 Hours)       Procedure Component Value Units Date/Time    CT Abdomen Pelvis Without Contrast [[id number]] Collected: 08/03/24 0010     Updated: 08/03/24 0021    Narrative:      PROCEDURE: CT of the abdomen and pelvis performed without IV contrast on  August 2, 2024. The examination was performed with 5 mm axial imaging  and 5 mm sagittal and coronal reconstruction images. Total . The  examination was performed according to as low as reasonably achievable  dose protocol.     HISTORY: Vomiting. Peritoneal dialysis.     COMPARISON: None.     FINDINGS:     Moderate degenerative disc disease at the L4-5 and L5-S1 levels  No acute or chronic compression fracture deformity or scoliosis  Mild osteoarthritis at the right and left hip joint.  Normal heart size with no pericardial effusion.  Small bands of scar versus atelectasis in the right middle lobe and  lower lingula.  Small volume of free fluid in the right upper quadrant anterior and  lateral to the right hepatic lobe.  Small volume of free fluid in the lower posterior pelvis.  Mild distended gallbladder likely due to fasting.  Mild chronic bilateral renal cortical volume loss  Fluid-filled bladder with small volume of air in the bladder lumen.  Multiple lower pelvic phleboliths.  No bowel or renal obstruction.  Significant and diffuse aortic and " aortic branch segment calcified  plaque in the abdomen and pelvis.  No abdominal aortic aneurysm or retroperitoneal hemorrhage.  Small focus of air identified in the right hemidiaphragm and lower  anterior right chest fat of nonspecific origin. This is seen on image  50, series 4.  Mild stranding in the presacral space possibly due to mild distal  colitis.       Impression:         1.  Peritoneal dialysis catheter noted in the anterior right abdominal  wall with the catheter noted to terminate in the left lateral pelvis.  2.  Small volume of free fluid in the right upper quadrant and lower  posterior pelvis and right lower quadrant.  3.  Slightly elevated left hemidiaphragm.  4.  No bowel or renal obstruction.  5.  No abdominal aortic aneurysm.  6.  No features of acute appendicitis.  7.  Stranding identified in the presacral space could present changes  related to mild distal colitis.  8.  Fluid-filled bladder with small volume of air in the bladder lumen.  9.  No pneumatosis.  10.  No conclusive features of free air.  11.  Distended configuration to the gallbladder likely due to fasting.  No conclusive features of gastritis or enteritis.        This report was finalized on 8/3/2024 12:19 AM by Carlos Andrews MD.       XR Foot 2 View Right [[id number]] Collected: 08/02/24 1953     Updated: 08/02/24 2011    Narrative:      INDICATION: Foot pain.     TECHNIQUE: 2 views of the right foot.     COMPARISON: No relevant priors.       Impression:      FINDINGS/IMPRESSION:    Partial amputation of the fifth metatarsal. Bony demineralization. No  acute fracture or dislocation. No lytic or blastic bony lesions seen.  Diffuse interphalangeal joint space loss. Mild degenerative changes in  the midfoot. No cortical erosion. Fixation timur in the  tibia/talus/calcaneus noted.  Diffuse soft tissue swelling. Vascular calcifications. No soft tissue  gas.        This report was finalized on 8/2/2024 7:54 PM by Alex Pallas, DO.                I have personally reviewed the patient's radiologic imaging.        EKG: ordered, results pending        Assessment & Plan    - Nausea, vomiting, suspect secondary to partially treated UTI, possible side effects of antibiotic recently started, possible colitis though no abdominal pain or tenderness, and infected diabetic right heel ulcer. Treat heel ulcer with IV vancomycin based on wound culture from 7/21 that grew enteroccus faecalis. Treat UTI with IV rocephin based on recent urine culture that grew E coli. Flagyl on board to cover possible colitis. Continue to trend WBC, CRP. Follow up repeat urine and blood cultures. Consult general surgery to evaluate right heel ulcer for debridement. Keep NPO for now since still nauseated and vomiting anyhow and because surgery may wish to proceed with debridement in the morning.   - ESRD on PD: consult nephrology to set up PD while in-house.  - Type II DM, insulin dependent: check A1c. Monitor accuchecks. Cover with SSI.     DVT Prophylaxis: SQ heparin    Estimated Length of Stay >2 midnights    I discussed the patient's findings, assessment and plan with the patient and ED provider Dr Mckeon.    Erwin Tatum MD  08/03/24  03:16 EDT      Electronically signed by Erwin Tatum MD at 08/03/24 0340       Current Facility-Administered Medications   Medication Dose Route Frequency Provider Last Rate Last Admin    atorvastatin (LIPITOR) tablet 80 mg  80 mg Oral Nightly Yandel Ortega DO   80 mg at 08/20/24 2025    sennosides-docusate (PERICOLACE) 8.6-50 MG per tablet 2 tablet  2 tablet Oral BID PRN Yandel Ortega DO        And    polyethylene glycol (MIRALAX) packet 17 g  17 g Oral Daily PRN Yandel Ortega DO        And    bisacodyl (DULCOLAX) EC tablet 5 mg  5 mg Oral Daily PRN Yandel Ortega DO        And    bisacodyl (DULCOLAX) suppository 10 mg  10 mg Rectal Daily PRN Yandel Ortega DO        Calcium Replacement - Follow Nurse / BPA Driven Protocol    Does not apply PRN Yandel Ortega DO        cholecalciferol (VITAMIN D3) capsule 50,000 Units  50,000 Units Oral Q7 Days Yandel Ortega DO   50,000 Units at 08/16/24 0832    dextrose (D50W) (25 g/50 mL) IV injection 25 g  25 g Intravenous Q15 Min PRN Yandel Ortega DO   25 g at 08/12/24 1740    dextrose (D50W) (25 g/50 mL) IV injection 25 g  25 g Intravenous Q15 Min PRN Yandel Ortega DO        dextrose (GLUTOSE) oral gel 15 g  15 g Oral Q15 Min PRN Yandel Ortega DO        ferrous sulfate tablet 325 mg  325 mg Oral Daily With Breakfast Yandel Ortega DO   325 mg at 08/21/24 0909    FLUoxetine (PROzac) capsule 10 mg  10 mg Oral Daily Yandel Ortega DO   10 mg at 08/21/24 0909    fluticasone (FLONASE) 50 MCG/ACT nasal spray 2 spray  2 spray Nasal Daily PRN Yandel Ortega DO        folic acid (FOLVITE) tablet 1 mg  1 mg Oral Daily Yandel Ortega DO   1 mg at 08/21/24 0910    gentamicin (GARAMYCIN) 0.1 % ointment 1 Application  1 Application Topical Daily Nicholas Arroyo MD        glucagon HCl (Diagnostic) injection 1 mg  1 mg Intramuscular Q15 Min PRN Yandel Ortega DO        hydrALAZINE (APRESOLINE) injection 10 mg  10 mg Intravenous Q6H PRN Yandel Ortega DO   10 mg at 08/19/24 0236    HYDROmorphone (DILAUDID) injection 0.5 mg  0.5 mg Intravenous Q4H PRN Yandel Ortega DO   0.5 mg at 08/20/24 1141    insulin glargine (LANTUS, SEMGLEE) injection 10 Units  10 Units Subcutaneous Daily Yandel Ortega DO   10 Units at 08/21/24 0910    Insulin Lispro (humaLOG) injection 2-9 Units  2-9 Units Subcutaneous 4x Daily AC & at Bedtime Yandel Ortega DO   6 Units at 08/21/24 1146    labetalol (NORMODYNE,TRANDATE) injection 10 mg  10 mg Intravenous Q4H PRN Yandel Ortega DO   10 mg at 08/16/24 0257    lacosamide (VIMPAT) tablet 50 mg  50 mg Oral Q12H Yandel Ortega DO   50 mg at 08/21/24 0912    lansoprazole (PREVACID SOLUTAB) disintegrating tablet Tablet Delayed Release Dispersible 30 mg  30 mg Oral Q AM Hazel  VASILIY Guzman   30 mg at 08/21/24 0547    levothyroxine (SYNTHROID, LEVOTHROID) tablet 100 mcg  100 mcg Oral QAM AC Yandel Ortega DO   100 mcg at 08/21/24 0909    Lidocaine 4 % 1 patch  1 patch Transdermal Daily PRN Yandel Ortega DO        magic barrier cream   Topical BID Narayan Mayorga, DO   Given at 08/21/24 0912    Magnesium Standard Dose Replacement - Follow Nurse / BPA Driven Protocol   Does not apply PRN Yandel Ortega DO        metoprolol tartrate (LOPRESSOR) tablet 12.5 mg  12.5 mg Oral Q12H Yandel Ortega DO   12.5 mg at 08/21/24 0909    nitroglycerin (NITROSTAT) SL tablet 0.4 mg  0.4 mg Sublingual Q5 Min PRN Yandel Ortega DO        ondansetron (ZOFRAN) injection 4 mg  4 mg Intravenous Q6H PRN Yandel Ortega DO   4 mg at 08/17/24 2059    oxyCODONE (ROXICODONE) immediate release tablet 5 mg  5 mg Oral Q4H PRN Yandel Ortega DO   5 mg at 08/21/24 0926    Phosphorus Replacement - Follow Nurse / BPA Driven Protocol   Does not apply PRN Yandel Ortega DO        potassium chloride (KLOR-CON) packet 40 mEq  40 mEq Oral Daily Megan Oliva PA-C   40 mEq at 08/21/24 0911    Potassium Replacement - Follow Nurse / BPA Driven Protocol   Does not apply PRN Yandel Ortega DO        sodium chloride 0.9 % flush 10 mL  10 mL Intravenous PRN Yandel Ortega DO        sodium chloride 0.9 % flush 10 mL  10 mL Intravenous Q12H Yandel Ortega DO   10 mL at 08/21/24 0913    sodium chloride 0.9 % flush 10 mL  10 mL Intravenous PRN Yandel Ortega DO        sodium chloride 0.9 % flush 10 mL  10 mL Intravenous Q12H Yandel Ortega DO   10 mL at 08/21/24 0913    sodium chloride 0.9 % flush 10 mL  10 mL Intravenous Q12H Yandel Ortega DO   10 mL at 08/21/24 0913    sodium chloride 0.9 % flush 10 mL  10 mL Intravenous Q12H Yandel Ortgea DO   10 mL at 08/21/24 0913    sodium chloride 0.9 % flush 10 mL  10 mL Intravenous PRN Yandel Ortega DO        sodium chloride 0.9 % flush 10 mL  10 mL Intravenous Q12H  JordanYandel, DO   10 mL at 24 0911    sodium chloride 0.9 % flush 10 mL  10 mL Intravenous PRN Jordan, Yandel, DO        sodium chloride 0.9 % flush 20 mL  20 mL Intravenous PRN Jordan, Yandel, DO        sodium chloride 0.9 % infusion 40 mL  40 mL Intravenous PRN Jordan, Yandel, DO        sodium chloride 0.9 % infusion 40 mL  40 mL Intravenous PRN [place], Yandel, DO        sodium chloride 0.9 % infusion 40 mL  40 mL Intravenous PRN Jordan, Yandel, DO        traZODone (DESYREL) tablet 25 mg  25 mg Oral Nightly [place], Yandel, DO   25 mg at 24        Physician Progress Notes (most recent note)        Narayan Mayorga DO at 24              [facility]     Patient Identification:  Name:  Reny Elena  Age:  64 y.o.  Sex:  female  :  1960  MRN:  [id number]  Visit Number:  [id number]  Primary Care Provider:  Susan Stephens APRN    Length of stay:  17    Chief complaint: None    Subjective:    Patient seen and examined at bedside with no nursing staff present. Patient was awake and staring at the wall when I entered the room. Patient appeared depressed, and made little eye contact during the exam. Patient does appear depressed and tearful, but states she currently has no complaints. Patient states she just wants to go home.  ----------------------------------------------------------------------------------------------------------------------  Current Hospital Meds:  atorvastatin, 80 mg, Oral, Nightly  cholecalciferol, 50,000 Units, Oral, Q7 Days  ferrous sulfate, 325 mg, Oral, Daily With Breakfast  FLUoxetine, 10 mg, Oral, Daily  folic acid, 1 mg, Oral, Daily  gentamicin, 1 Application, Topical, Daily  insulin glargine, 10 Units, Subcutaneous, Daily  insulin lispro, 2-9 Units, Subcutaneous, 4x Daily AC & at Bedtime  lacosamide, 50 mg, Oral, Q12H  lansoprazole, 30 mg, Oral, Q AM  levothyroxine, 100 mcg, Oral, QAM AC  magic barrier  cream, , Topical, BID  metoprolol tartrate, 12.5 mg, Oral, Q12H  potassium chloride, 40 mEq, Oral, Daily  sodium chloride, 10 mL, Intravenous, Q12H  sodium chloride, 10 mL, Intravenous, Q12H  sodium chloride, 10 mL, Intravenous, Q12H  sodium chloride, 10 mL, Intravenous, Q12H  sodium chloride, 10 mL, Intravenous, Q12H  traZODone, 25 mg, Oral, Nightly         ----------------------------------------------------------------------------------------------------------------------  Vital Signs:  Temp:  [98 °F (36.7 °C)-98.9 °F (37.2 °C)] 98.9 °F (37.2 °C)  Heart Rate:  [63-76] 76  Resp:  [16-18] 16  BP: ()/(52-97) 139/68      08/17/24  0600 08/19/24  0500 08/20/24  0600   Weight: 48.1 kg (106 lb 0.7 oz) 45.1 kg (99 lb 6.4 oz) (Nik Piña) 42.7 kg (94 lb 3.2 oz)     Body mass index is 16.17 kg/m².    Intake/Output Summary (Last 24 hours) at 8/20/2024 1806  Last data filed at 8/20/2024 1700  Gross per 24 hour   Intake 1200 ml   Output 1276 ml   Net -76 ml     Diet: Regular/House; Feeding Assistance - Nursing; Texture: Pureed (NDD 1); Fluid Consistency: Thin (IDDSI 0)  ----------------------------------------------------------------------------------------------------------------------  Physical exam:  Constitutional: Chronically ill-appearing  female in no apparent distress.     HENT:  Head:  Normocephalic and atraumatic.  Mouth:  Moist mucous membranes.    Eyes:  Conjunctivae and EOM are normal.  Pupils are equal, round, and reactive to light.  No scleral icterus.    Neck:  Neck supple. No thyromegaly.  No JVD present.    Cardiovascular:  Regular rate and rhythm with no murmurs, rubs, clicks or gallops appreciated.  Pulmonary/Chest:  Clear to auscultation bilaterally with no crackles, wheezes or rhonchi appreciated.  Abdominal:  Soft. Nontender. Nondistended, peritoneal dialysis catheter in place.  Bowel sounds are normal in all four quadrants. No organomegally appreciated.   Musculoskeletal: Patient  with diffuse anasarca, no tenderness, and no deformity.  No red or swollen joints anywhere.    Neurological:  Alert, Cranial nerves II-XII intact with no focal deficits.  No facial droop.  No slurred speech.   Skin:  Warm and dry to palpation with no rashes or lesions appreciated.  Peripheral vascular:  2+ radial and pedal pulses in bilateral upper and lower extremities.  Psychiatric:  Alert and oriented x3, demonstrates appropriate judgment and insight.    Little change in physical exam in comparison to 8/19/2024  ------------------------------------------------------------------  ----------------------------------------------------------------------------------------------------------------------      Results from last 7 days   Lab Units 08/20/24  0421 08/19/24  0153 08/18/24  1553 08/15/24  1116 08/15/24  0901 08/14/24  2314 08/14/24  1521 08/14/24  0702 08/13/24  2329 08/13/24  2154   LACTATE mmol/L  --   --   --   --   --   --   --   --   --  1.5   WBC 10*3/mm3 3.89 4.27 4.33   < > 3.97 4.31  --  5.70   < >  --    HEMOGLOBIN g/dL 11.1* 12.1 11.4*   < > 11.4* 6.3*  --  9.2*   < >  --    HEMATOCRIT % 31.8* 34.9 33.3*   < > 33.6* 18.9*  --  26.8*   < >  --    MCV fL 84.8 84.3 85.6   < > 85.1 93.1  --  92.7   < >  --    MCHC g/dL 34.9 34.7 34.2   < > 33.9 33.3  --  34.3   < >  --    PLATELETS 10*3/mm3 35* 32* 36*   < > 61* 79*  --  104*   < >  --    INR   --   --   --   --  0.88* 1.13* 1.20* 1.46*  --  2.13*    < > = values in this interval not displayed.     Results from last 7 days   Lab Units 08/14/24  0503   PH, ARTERIAL pH units 7.411   PO2 ART mm Hg 72.0*   PCO2, ARTERIAL mm Hg 34.6*   HCO3 ART mmol/L 21.9     Results from last 7 days   Lab Units 08/20/24  0421 08/19/24  1324 08/19/24  0153 08/18/24  1553 08/16/24  0646 08/16/24  0028 08/15/24  0901   SODIUM mmol/L 136  --  138 137   < > 137 137   POTASSIUM mmol/L 2.8* 2.9* 2.7* 2.7*   < > 3.3* 3.1*   MAGNESIUM mg/dL  --   --  2.0  --   --  2.2 1.3*  "  CHLORIDE mmol/L 101  --  102 102   < > 99 98   CO2 mmol/L 24.6  --  23.5 22.1   < > 22.0 20.9*   BUN mg/dL 40*  --  37* 34*   < > 25* 23   CREATININE mg/dL 2.80*  --  2.80* 2.83*   < > 2.61* 2.54*   CALCIUM mg/dL 6.9*  --  7.0* 6.8*   < > 7.2* 7.4*   GLUCOSE mg/dL 112*  --  188* 84   < > 216* 163*   ALBUMIN g/dL 1.9*  --  2.1*  --   --  2.5* 2.9*   BILIRUBIN mg/dL 0.4  --  0.5  --   --  0.6 1.2   ALK PHOS U/L 372*  --  488*  --   --  496* 422*   AST (SGOT) U/L 53*  --  77*  --   --  258* 400*   ALT (SGPT) U/L 25  --  26  --   --  30 32    < > = values in this interval not displayed.   Estimated Creatinine Clearance: 13.3 mL/min (A) (by C-G formula based on SCr of 2.8 mg/dL (H)).    No results found for: \"AMMONIA\"      Blood Culture   Date Value Ref Range Status   08/12/2024 No growth at 5 days  Final     No results found for: \"URINECX\"  No results found for: \"WOUNDCX\"  No results found for: \"STOOLCX\"    I have personally looked at the labs and they are summarized above.  ----------------------------------------------------------------------------------------------------------------------  Imaging Results (Last 24 Hours)       ** No results found for the last 24 hours. **          ----------------------------------------------------------------------------------------------------------------------  Assessment and Plan:    Septic shock (present on admission, resolved) -likely multifactorial from right lower extremity/right ankle osteomyelitis in combination with volume shifts from ESRD and severe hypoalbuminemia from severe protein calorie malnutrition.    2.  Acute ischemic hepatitis -slowly improving, will repeat CMP in the morning    3.  ESRD on PD -appreciate nephrology recommendations, continue PD per their recommendations.    4.  Severe malnutrition -appreciate nutrition recommendations, will continue Novasource renal shakes on a regular basis.  Continue to encourage oral intake    5.  Right ankle " osteomyelitis - s/p right BKA, currently doing well, patient has completed full course of antibiotic therapy with vancomycin and Zosyn.  Continue to monitor off antibiotic therapy.    6.  Chronic debility    7.  Type 2 diabetes mellitus -continue Accu-Cheks before every meal and nightly with sliding scale insulin    8.  Metabolic encephalopathy -resolved    9.  Acute blood loss anemia -hemoglobin now stable.  Continue to monitor closely and transfuse for hemoglobin less than 7    10.  Thrombocytopenia -HIT panel currently pending, no active bleeding, no need for platelet transfusion at this time.  Will transfuse for platelet count less than 10,000 or if actively bleeding less than 50,000.    Disposition: Will likely be discharged in the next 24 hours    Narayan Mayorga DO   08/20/24   18:06 EDT       Electronically signed by Narayan Mayorga DO at 08/20/24 1831          Consult Notes (most recent note)        Jennifer Sanders APRN at 08/14/24 1135        Consult Orders    1. Inpatient Palliative Care MD Consult [[id number]] ordered by Johnson Rider MD at 08/14/24 0951                 Palliative Care Initial Consult     Attending Physician: Rebeka Woodruff MD  Referring Provider: Ashu Woodruff MD    assistance with advance directives, assistance with clarification of goals of care, and psychosocial support  Code Status:   Code Status and Medical Interventions: No CPR (Do Not Attempt to Resuscitate); Limited Support; No intubation (DNI)   Ordered at: 08/04/24 1645     Medical Intervention Limits:    No intubation (DNI)     Code Status (Patient has no pulse and is not breathing):    No CPR (Do Not Attempt to Resuscitate)     Medical Interventions (Patient has pulse or is breathing):    Limited Support      Advanced Directives: Advance Directive Status: Patient has advance directive, copy in chart   Healthcare surrogate: PRESLEY Leiva significant other  Goals of Care: I was able to speak  with Cliff today at bedside to discuss GOC/ACP as Reny is not able herself at this time. Cliff confirmed that Reny is a DNR/DNI. We discussed Reny's condition, quality of life and that Cliff would like to get her back home with him if she was able. I told Cliff that we would be her e for support for Reny and him.    HPI:  Reny Elena is a 64 y.o. female admitted on 8/2/2024 due to nausea, vomiting, with right foot pain and fever for a week before presenting to the ER. Reny has a medical history of arthritis, insulin dependent type II DM, chronic diastolic CHF, hypothyroidism, seizure disorder, HTN, HLD, GERD, iron deficiency anemia, PAD, and ESRD on PD,   Reny was given an antibiotic at prior ER visit on 7/21/24 due to a UTI and she stated after a couple of days of antibiotic she began to experience nausea and vomiting, apparently she stopped taking the antibiotic and symptoms continued to persist. This visit in the ED Reny did complain of increased rt foot pain. Reny was tachycardic. 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 Pulmonary, Infectious disease, and Nephrology are all consulted on Ms Elena.      ROS: Negative except as above in HPI.     Past Medical History:   Diagnosis Date    Arthritis     Closed fracture of right fibula and tibia 12/25/2018    Depression     Diabetic ulcer of left foot associated with type 2 diabetes mellitus 6/23/2017    Diastolic dysfunction     Disease of thyroid gland     Elevated cholesterol     End stage renal disease     Essential hypertension     GERD (gastroesophageal reflux disease)     History of transfusion     Hyperlipidemia     Iron deficiency anemia 12/30/2018    PAD (peripheral artery disease)     Renal failure     Type 2 diabetes mellitus with hyperglycemia, with long-term current use of insulin 12/30/2018     Past Surgical History:   Procedure Laterality Date    ABDOMINAL SURGERY      ABOVE KNEE AMPUTATION  Right 2024    Procedure: AMPUTATION ABOVE KNEE;  Surgeon: Angelo Santoro MD;  Location: [facility] OR;  Service: General;  Laterality: Right;    BACK SURGERY      Post spinal diskectomy, osteophytectomy in one lumbar interspace    CATARACT EXTRACTION      Left      SECTION      DENTAL PROCEDURE      ENDOSCOPY      FRACTURE SURGERY      right leg    HYSTERECTOMY      INCISION AND DRAINAGE LEG Left 4/15/2017    Procedure: INCISION AND DRAINAGE LOWER EXTREMITY;  Surgeon: Basilio Morris MD;  Location: [facility] OR;  Service:     INCISION AND DRAINAGE LEG Left 2017    Procedure: Irrigation and Debridment abcess diabetic wound left foot with deep culture;  Surgeon: Basilio Morris MD;  Location: [facility] OR;  Service:     INSERTION PERITONEAL DIALYSIS CATHETER N/A 2021    Procedure: INSERTION PERITONEAL DIALYSIS CATHETER LAPAROSCOPIC;  Surgeon: Edy Glover MD;  Location: [facility] OR;  Service: General;  Laterality: N/A;    KNEE ARTHROSCOPY Right     ORIF TIBIA FRACTURE Right 2018    Procedure: TIBIAL  FRACTURE OPEN REDUCTION INTERNAL FIXATION;  Surgeon: Jung Barragan MD;  Location: [facility] OR;  Service: Orthopedics    TOE AMPUTATION Right     5th    TUBAL ABDOMINAL LIGATION       Social History     Socioeconomic History    Marital status:    Tobacco Use    Smoking status: Never     Passive exposure: Never    Smokeless tobacco: Never   Vaping Use    Vaping status: Never Used   Substance and Sexual Activity    Alcohol use: No    Drug use: No    Sexual activity: Defer     Partners: Male     Family History   Problem Relation Age of Onset    Heart disease Mother     Cancer Mother     COPD Mother     Diabetes Other     Depression Other        Allergies   Allergen Reactions    Morphine Nausea And Vomiting    Penicillins Hives and GI Intolerance             Codeine Hives, Rash and GI Intolerance     Pt tolerates [place] @ home    Sulfa Antibiotics Hives, Rash and GI Intolerance     NAUSEA TOO        Current Facility-Administered Medications   Medication Dose Route Frequency Provider Last Rate Last Admin    aspirin EC tablet 81 mg  81 mg Oral Daily Rebeka Woodruff MD   81 mg at 08/14/24 0800    [Held by provider] atorvastatin (LIPITOR) tablet 80 mg  80 mg Oral Nightly Angelo Santoro MD   80 mg at 08/10/24 2034    sennosides-docusate (PERICOLACE) 8.6-50 MG per tablet 2 tablet  2 tablet Oral BID PRN Rebeka Woodruff MD        And    polyethylene glycol (MIRALAX) packet 17 g  17 g Oral Daily PRN Rebeka Woodruff MD        And    bisacodyl (DULCOLAX) EC tablet 5 mg  5 mg Oral Daily PRN Rebeka Woodruff MD        And    bisacodyl (DULCOLAX) suppository 10 mg  10 mg Rectal Daily PRN Rebeka Woodruff MD        Calcium Replacement - Follow Nurse / BPA Driven Protocol   Does not apply PRN Rebeka Woodruff MD        dextrose (D50W) (25 g/50 mL) IV injection 25 g  25 g Intravenous Q15 Min PRN Rebeka Woodruff MD   25 g at 08/12/24 1740    dextrose (D50W) (25 g/50 mL) IV injection 25 g  25 g Intravenous Q15 Min PRN Rebeka Woodruff MD        dextrose (GLUTOSE) oral gel 15 g  15 g Oral Q15 Min PRN Rebeka Woodruff MD        ferrous sulfate tablet 325 mg  325 mg Oral Daily With Breakfast Rebeka Woodruff MD   325 mg at 08/14/24 0759    FLUoxetine (PROzac) capsule 10 mg  10 mg Oral Daily Rebeka Woodruff MD   10 mg at 08/14/24 0800    fluticasone (FLONASE) 50 MCG/ACT nasal spray 2 spray  2 spray Nasal Daily PRN Rebeka Woodruff MD        folic acid (FOLVITE) tablet 1 mg  1 mg Oral Daily Rebeka Woodruff MD   1 mg at 08/14/24 0800    glucagon HCl (Diagnostic) injection 1 mg  1 mg Intramuscular Q15 Min PRN Rebeka Woodruff MD        Hydrocortisone Sod Suc (PF) (Solu-CORTEF) injection 50 mg  50 mg Intravenous Q6H Johnson Rider MD   50 mg at 08/14/24 1359    Insulin Lispro (humaLOG) injection 2-7 Units  2-7 Units Subcutaneous 4x Daily AC & at Bedtime Oculam,  Rebeka Anguiano MD   2 Units at 08/14/24 0801    ipratropium (ATROVENT) nebulizer solution 0.5 mg  0.5 mg Nebulization Q6H PRN Rebeka Woodruff MD        lacosamide (VIMPAT) tablet 50 mg  50 mg Oral Q12H Rebeka Woodruff MD   50 mg at 08/14/24 0800    levothyroxine (SYNTHROID, LEVOTHROID) tablet 100 mcg  100 mcg Oral QAM AC Rebeka Woodruff MD   100 mcg at 08/14/24 0759    Lidocaine 4 % 1 patch  1 patch Transdermal Daily PRN Rebeka Woodruff MD        Magnesium Standard Dose Replacement - Follow Nurse / BPA Driven Protocol   Does not apply PRN Rebeka Woodruff MD        midodrine (PROAMATINE) tablet 7.5 mg  7.5 mg Oral TID AC Rebeka Woodruff MD   7.5 mg at 08/12/24 1735    mupirocin (BACTROBAN) 2 % ointment 1 Application  1 Application Topical Q12H Rebeka Woodruff MD   1 Application at 08/14/24 0957    nitroglycerin (NITROSTAT) SL tablet 0.4 mg  0.4 mg Sublingual Q5 Min PRN Rebeka Woodruff MD        norepinephrine (LEVOPHED) 8 mg in 250 mL NS infusion (premix)  0.02-0.3 mcg/kg/min Intravenous Titrated Rebeka Woodruff MD   Stopped at 08/13/24 0322    oxyCODONE (ROXICODONE) immediate release tablet 5 mg  5 mg Oral Q4H PRN Jennifer Sanders APRN   5 mg at 08/14/24 1205    pantoprazole (PROTONIX) EC tablet 40 mg  40 mg Oral QAM AC Rebeka Woodruff MD   40 mg at 08/14/24 0759    Pharmacy Consult - Pharmacy to dose   Does not apply Continuous Rebeka Woodruff MD        Pharmacy Consult - Pharmacy to dose   Does not apply Continuous Rebeka Woodruff MD        phenylephrine (KAILA-SYNEPHRINE) 50 mg in 250 mL NS infusion  0.5-3 mcg/kg/min Intravenous Titrated Rebeka Woodruff MD   Stopped at 08/13/24 0519    Phosphorus Replacement - Follow Nurse / BPA Driven Protocol   Does not apply PRN Rebeka oWodruff MD        piperacillin-tazobactam (ZOSYN) IVPB 3.375 g IVPB in 100 mL NS (VTB)  3.375 g Intravenous Q12H Rebeka Woodruff MD   3.375 g at 08/14/24 0801    Potassium  Replacement - Follow Nurse / BPA Driven Protocol   Does not apply PRN Rebeka Woodruff MD        sodium chloride 0.45 % 1,000 mL with sodium bicarbonate 8.4 % 75 mEq infusion   Intravenous Continuous Rebeka Woodruff MD 75 mL/hr at 08/14/24 0800 New Bag at 08/14/24 0800    sodium chloride 0.9 % bolus 1,000 mL  1,000 mL Intravenous Once Rebeka Woodruff MD 2,000 mL/hr at 08/12/24 0315 Currently Infusing at 08/12/24 0315    sodium chloride 0.9 % flush 10 mL  10 mL Intravenous PRN Rebeka Woodruff MD        sodium chloride 0.9 % flush 10 mL  10 mL Intravenous Q12H Rebeka Woodruff MD   10 mL at 08/14/24 0957    sodium chloride 0.9 % flush 10 mL  10 mL Intravenous PRN Rebeka Woodruff MD        sodium chloride 0.9 % flush 10 mL  10 mL Intravenous Q12H Rebeka Woodruff MD   10 mL at 08/14/24 0957    sodium chloride 0.9 % flush 10 mL  10 mL Intravenous Q12H Rebeka Woodruff MD   10 mL at 08/14/24 0957    sodium chloride 0.9 % flush 10 mL  10 mL Intravenous Q12H Rebeka Woodruff MD   10 mL at 08/14/24 0957    sodium chloride 0.9 % flush 10 mL  10 mL Intravenous PRN Rebeka Woodruff MD        sodium chloride 0.9 % flush 10 mL  10 mL Intravenous Q12H Rebeka Woodruff MD   10 mL at 08/14/24 0957    sodium chloride 0.9 % flush 10 mL  10 mL Intravenous PRN Rebeka Woodruff MD        sodium chloride 0.9 % flush 20 mL  20 mL Intravenous PRN Rebeka Woodruff MD        sodium chloride 0.9 % infusion 40 mL  40 mL Intravenous PRN Rebeka Woodruff MD        sodium chloride 0.9 % infusion 40 mL  40 mL Intravenous PRN Rebeka Woodruff MD        sodium chloride 0.9 % infusion 40 mL  40 mL Intravenous PRN Rebeka Woodruff MD        traZODone (DESYREL) tablet 25 mg  25 mg Oral Nightly Rebeka Woodruff MD   25 mg at 08/12/24 2013    Vancomycin Pharmacy Intermittent/Pulse Dosing   Does not apply Daily Rebeka Woodruff MD         norepinephrine, 0.02-0.3 mcg/kg/min, Last  "Rate: Stopped (08/13/24 0322)  Pharmacy Consult - Pharmacy to dose,   Pharmacy Consult - Pharmacy to dose,   phenylephrine, 0.5-3 mcg/kg/min, Last Rate: Stopped (08/13/24 0519)  sodium chloride 0.45 % 1,000 mL with sodium bicarbonate 8.4 % 75 mEq infusion, , Last Rate: 75 mL/hr at 08/14/24 0800        senna-docusate sodium **AND** polyethylene glycol **AND** bisacodyl **AND** bisacodyl    Calcium Replacement - Follow Nurse / BPA Driven Protocol    dextrose    dextrose    dextrose    fluticasone    glucagon (human recombinant)    ipratropium    Lidocaine    Magnesium Standard Dose Replacement - Follow Nurse / BPA Driven Protocol    nitroglycerin    oxyCODONE    Phosphorus Replacement - Follow Nurse / BPA Driven Protocol    Potassium Replacement - Follow Nurse / BPA Driven Protocol    sodium chloride    sodium chloride    sodium chloride    sodium chloride    sodium chloride    sodium chloride    sodium chloride    sodium chloride    Current medication reviewed for route, type, dose and frequency and are current per MAR.    Palliative Performance Scale Score:     /96   Pulse 92   Temp 98 °F (36.7 °C) (Oral)   Resp 12   Ht 162.6 cm (64.02\")   Wt 46.5 kg (102 lb 8.2 oz)   LMP 05/26/2005   SpO2 98%   BMI 17.59 kg/m²     Intake/Output Summary (Last 24 hours) at 8/14/2024 1519  Last data filed at 8/14/2024 1358  Gross per 24 hour   Intake 3492.5 ml   Output 400 ml   Net 3092.5 ml       PE:  General Appearance:    Chronically ill appearing, alert, slow to respond.cooperative, NAD   HEENT:    NC/AT, without obvious abnormality, EOMI, anicteric    Neck:   supple, trachea midline, no JVD   Lungs:     Unlabored respirations, no wheezing rhonchi or rales noted.    Heart:    RRR, normal S1 and S2, no M/R/G   Abdomen:     Soft, NT, ND, NABS    Extremities:   RT Aka with kerlex and ace bandage intact, edema in left lower extremity   Pulses:   Pulses palpable and equal bilaterally   Skin:   Warm, dry   Neurologic:   " Awake and alert to self only, slow to respond to questions, with sometimes appropriate answers.   Psych:   Calm, flat       Labs:   Results from last 7 days   Lab Units 08/14/24  0702   WBC 10*3/mm3 5.70   HEMOGLOBIN g/dL 9.2*   HEMATOCRIT % 26.8*   PLATELETS 10*3/mm3 104*     Results from last 7 days   Lab Units 08/14/24  0702   SODIUM mmol/L 134*   POTASSIUM mmol/L 3.7   CHLORIDE mmol/L 100   CO2 mmol/L 18.5*   BUN mg/dL 21   CREATININE mg/dL 3.03*   GLUCOSE mg/dL 136*   CALCIUM mg/dL 6.8*     Results from last 7 days   Lab Units 08/14/24  0702   SODIUM mmol/L 134*   POTASSIUM mmol/L 3.7   CHLORIDE mmol/L 100   CO2 mmol/L 18.5*   BUN mg/dL 21   CREATININE mg/dL 3.03*   CALCIUM mg/dL 6.8*   BILIRUBIN mg/dL 0.9   ALK PHOS U/L 433*   ALT (SGPT) U/L 73*   AST (SGOT) U/L 1,095*   GLUCOSE mg/dL 136*     Imaging Results (Last 72 Hours)       Procedure Component Value Units Date/Time    CT Head Without Contrast [[id number]] Collected: 08/14/24 0059     Updated: 08/14/24 0101    Narrative:      Noncontrast CT brain     Indications: Altered mental status     Technique: Noncontrast CT images of the brain were obtained.  Limited  exposure control, adjustment of the MA and/or KV according to patient  size or use of an iterative reconstruction technique was utilized.     Findings CT brain:     Comparison is made to the prior study dated 4/28/2024.     Examination is somewhat limited by patient positioning.  Axial images  are not true brain axial images.  Allowing for this, there is no  evidence of acute intercranial hemorrhage.  The ventricles are  prominent, stable from the prior study.  Patchy and confluent areas of  hypodensity involve the periventricular deep white matter compatible  sequelae of chronic small vessel ischemic changes.  There is no  mass-effect or midline shift.  No abnormal extra-axial fluid collections  are demonstrated.       Impression:      Impression:     No CT evidence of an acute intracranial  process.     This report was finalized on 8/14/2024 12:59 AM by Chin Chowdhury MD.       CT Chest Without Contrast Diagnostic [[id number]] Collected: 08/13/24 1955     Updated: 08/13/24 2010    Narrative:      Procedure: Contiguous axial images were obtained through the chest  abdomen and pelvis without intravenous contrast.  The use of sagittal  coronal reformations are supplied.     Comparison: CT abdomen 8/22/2024, chest 4/28/2024     Findings     CHEST: Chest is well-expanded with diffuse groundglass attenuation  throughout all lobes.     A small left pleural effusion is present with airspace consolidation.     Trachea and mainstem bronchi are patent.     Central venous catheter present with distal tip at the atrial caval  junction.  Heart size within normal limits.     No adenopathy in the chest.     Cachexia and anasarca noted.     In bone windows, sternum, thoracic vertebral bodies and ribs are intact.   No pneumothorax.     ABDOMEN/PELVIS: Moderate ascites present.     Diffuse anasarca noted.  A catheter enters the mid abdomen, to the left  of midline with distal tip coiled in the left mid abdomen.     There may be fluid surrounding the gallbladder which is not  well-defined.     No extraluminal gas.  Urinary bladder distends normally.  A large amount  of free fluid in the pelvis.  Uterus is not identified.  A small amount  of formed stool present in the colon.  A discrete appendix is not  identified.     The noncontrast enhanced liver, spleen, stomach and adrenals are  morphologically unremarkable.  Kidneys are atrophic with calcified  vascular structures.     No dilated loops of bowel or bowel obstruction.     Above-the-knee amputation noted on the  view.     Moderate degenerative change at L5-S1.       Impression:         1.  Small to moderate left pleural effusion with adjacent airspace  consolidation suggesting atelectasis or pneumonia.     2.  Diffuse groundglass attenuation of the lungs  compatible with  alveolitis.     3.   Mildly distended gallbladder with indistinct appearance.  If acute  cholecystitis is of clinical concern, right upper quadrant ultrasound  could be considered.     4.  Anasarca              This report was finalized on 8/13/2024 8:08 PM by Regina Magaña MD.       CT Abdomen Pelvis Without Contrast [[id number]] Collected: 08/13/24 1955     Updated: 08/13/24 2010    Narrative:      Procedure: Contiguous axial images were obtained through the chest  abdomen and pelvis without intravenous contrast.  The use of sagittal  coronal reformations are supplied.     Comparison: CT abdomen 8/22/2024, chest 4/28/2024     Findings     CHEST: Chest is well-expanded with diffuse groundglass attenuation  throughout all lobes.     A small left pleural effusion is present with airspace consolidation.     Trachea and mainstem bronchi are patent.     Central venous catheter present with distal tip at the atrial caval  junction.  Heart size within normal limits.     No adenopathy in the chest.     Cachexia and anasarca noted.     In bone windows, sternum, thoracic vertebral bodies and ribs are intact.   No pneumothorax.     ABDOMEN/PELVIS: Moderate ascites present.     Diffuse anasarca noted.  A catheter enters the mid abdomen, to the left  of midline with distal tip coiled in the left mid abdomen.     There may be fluid surrounding the gallbladder which is not  well-defined.     No extraluminal gas.  Urinary bladder distends normally.  A large amount  of free fluid in the pelvis.  Uterus is not identified.  A small amount  of formed stool present in the colon.  A discrete appendix is not  identified.     The noncontrast enhanced liver, spleen, stomach and adrenals are  morphologically unremarkable.  Kidneys are atrophic with calcified  vascular structures.     No dilated loops of bowel or bowel obstruction.     Above-the-knee amputation noted on the  view.     Moderate degenerative change at  L5-S1.       Impression:         1.  Small to moderate left pleural effusion with adjacent airspace  consolidation suggesting atelectasis or pneumonia.     2.  Diffuse groundglass attenuation of the lungs compatible with  alveolitis.     3.   Mildly distended gallbladder with indistinct appearance.  If acute  cholecystitis is of clinical concern, right upper quadrant ultrasound  could be considered.     4.  Anasarca              This report was finalized on 8/13/2024 8:08 PM by Regina Magaña MD.       XR Chest 1 View [[id number]] Collected: 08/12/24 1630     Updated: 08/12/24 1632    Narrative:      EXAM:    XR Chest, 1 View     EXAM DATE:    8/12/2024 4:26 PM     CLINICAL HISTORY:    CVC Placement Verification; E11.621-Type 2 diabetes mellitus with foot  ulcer; L97.416-Non-pressure chronic ulcer of right heel and midfoot with  bone involvement without evidence of necrosis; Z99.2-Dependence on renal  dialysis; R11.2-Nausea with vomiting, unspecified; N30.01-Acute cystitis  with hematuria; L97.519-Non-pressure chronic ulcer of other part of  right foot with unspecified severity     TECHNIQUE:    Frontal view of the chest.     COMPARISON:    8/11/2024     FINDINGS:    LUNGS AND PLEURAL SPACES:  Coarsened interstitial markings noted  throughout the lungs.  No consolidation.  No pneumothorax.    HEART:  Unremarkable as visualized.  No cardiomegaly.    MEDIASTINUM:  Unremarkable as visualized.  Normal mediastinal contour.    BONES/JOINTS:  Unremarkable as visualized.  No acute fracture.    TUBES, LINES AND DEVICES:  Left subclavian central venous catheter tip  in the superior vena cava.       Impression:        No acute cardiopulmonary findings identified.        This report was finalized on 8/12/2024 4:30 PM by Dr. Harrison Biswas MD.       US Liver [[id number]] Collected: 08/11/24 1655     Updated: 08/11/24 1659    Narrative:      PROCEDURE: Abdominal ultrasound evaluation performed on August 11, 2024.  Color flow  imaging performed.     HISTORY: Elevated liver function tests.     COMPARISON: None.     FINDINGS:     Echogenic liver suggestive of fatty infiltration.  Small volume of free fluid adjacent to the liver consistent with  ascites.  Patent hepatic veins with appropriate direction of flow.  Patent main portal vein with appropriate direction of flow.  No hepatic mass  No dilated intrahepatic bile ducts  Sludge noted in the gallbladder lumen.  No gallbladder wall thickening or para cholecystic fluid.  No right renal hydronephrosis  No features of cholecystitis.       Impression:         1.  Echogenic liver suggestive of fatty infiltration.  2.  Patent main portal vein with appropriate direction of flow.  3.  Patent hepatic veins with appropriate direction of flow.  4.  Sludge noted in the gallbladder lumen.  5.  No features of cholecystitis.  6.  Small volume of free fluid in the right upper quadrant consistent  with ascites.  7.  No hepatic mass or cyst  8.  No dilated intrahepatic bile ducts.     This report was finalized on 8/11/2024 4:57 PM by Carlos Andrews MD.               Diagnostics: Reviewed    A: Reny Elena is a 64 y.o. female admitted on 8/2/2024 due to nausea, vomiting, with right foot pain and fever for a week before presenting to the ER. Reny has a medical history of arthritis, insulin dependent type II DM, chronic diastolic CHF, hypothyroidism, seizure disorder, HTN, HLD, GERD, iron deficiency anemia, PAD, and ESRD on PD,   Reny was given an antibiotic at prior ER visit on 7/21/24 due to a UTI and she stated after a couple of days of antibiotic she began to experience nausea and vomiting, apparently she stopped taking the antibiotic and symptoms continued to persist. This visit in the ED Reny did complain of increased rt foot pain. Reny was tachycardic. 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 Pulmonary, Infectious disease, and Nephrology are all  consulted on Ms Elena.           P:    I was able to speak with Cliff today at bedside to discuss GOC/ACP as Reny is not able herself at this time. Cliff confirmed that Reny is a DNR/DNI. We discussed Reny's condition, quality of life and that Cliff would like to get her back home with him if she was able. I told Cliff that we would be her e for support for Reny and him. I discussed Reny/treatment plan with Dr Woodruff and GLORIA Dunham.    We appreciate the consult and the opportunity to participate in Reny Elena's care. We will continue to follow along. Please do not hesitate to contact us regarding further symptom management or goals of care needs, including after hours or on weekends via our on call provider at [phone].     Time: 55 minutes spent reviewing medical and medication records, assessing and examining patient, discussing with family, answering questions, providing some guidance about a plan and documentation of care, and coordinating care with other healthcare members, with > 50% time spent face to face.     DARIUS Berrios    2024      Electronically signed by Jennifer Sanders APRN at 24 1555          Physical Therapy Notes (most recent note)        Anu Chin, PT at 24 1144  Version 1 of 1         Acute Care - Physical Therapy Treatment Note  [facility]     Patient Name: Reny Elena  : 1960  MRN: [id number]  Today's Date: 2024      Visit Dx:     ICD-10-CM ICD-9-CM   1. Diabetic ulcer of right heel associated with type 2 diabetes mellitus, with bone involvement without evidence of necrosis  E11.621 250.80    L97.416 707.14   2. Peritoneal dialysis finding  Z99.2 V45.11   3. Nausea and vomiting, unspecified vomiting type  R11.2 787.01   4. Acute cystitis with hematuria  N30.01 595.0   5. Ulcer of right foot, unspecified ulcer stage  L97.519 707.15     Patient Active Problem List   Diagnosis    Tibia/fibula fracture    Iron deficiency anemia    Type 2  diabetes mellitus with hyperglycemia, with long-term current use of insulin    Wound of right ankle    Cellulitis    Metabolic encephalopathy    History of non-ST elevation myocardial infarction (NSTEMI)    HTN (hypertension)    CVA (cerebral vascular accident)    Chronic diastolic CHF (congestive heart failure)    Decubitus ulcer of coccyx, unspecified pressure ulcer stage    Depression    Expressive aphasia    Impaired mobility and ADLs    Hyperkalemia    Subdural hematoma    Severe malnutrition    Cerebrovascular accident (CVA), unspecified mechanism    PRES (posterior reversible encephalopathy syndrome)    Debility    Stage 5 chronic kidney disease on chronic dialysis    Seizure-like activity    Seizure    Open wound    Pressure injury of right heel, stage 3    Pressure injury of sacral region, stage 2    Pressure injury of right heel, unstageable    Diabetic ulcer of right heel     Past Medical History:   Diagnosis Date    Arthritis     Closed fracture of right fibula and tibia 2018    Depression     Diabetic ulcer of left foot associated with type 2 diabetes mellitus 2017    Diastolic dysfunction     Disease of thyroid gland     Elevated cholesterol     End stage renal disease     Essential hypertension     GERD (gastroesophageal reflux disease)     History of transfusion     Hyperlipidemia     Iron deficiency anemia 2018    PAD (peripheral artery disease)     Renal failure     Type 2 diabetes mellitus with hyperglycemia, with long-term current use of insulin 2018     Past Surgical History:   Procedure Laterality Date    ABDOMINAL SURGERY      ABOVE KNEE AMPUTATION Right 2024    Procedure: AMPUTATION ABOVE KNEE;  Surgeon: Angelo Santoro MD;  Location: [facility];  Service: General;  Laterality: Right;    BACK SURGERY      Post spinal diskectomy, osteophytectomy in one lumbar interspace    CATARACT EXTRACTION      Left      SECTION      DENTAL PROCEDURE      ENDOSCOPY       FRACTURE SURGERY      right leg    HYSTERECTOMY      INCISION AND DRAINAGE LEG Left 4/15/2017    Procedure: INCISION AND DRAINAGE LOWER EXTREMITY;  Surgeon: Basilio Morris MD;  Location: [facility] OR;  Service:     INCISION AND DRAINAGE LEG Left 5/26/2017    Procedure: Irrigation and Debridment abcess diabetic wound left foot with deep culture;  Surgeon: Basilio Morris MD;  Location: [facility] OR;  Service:     INSERTION PERITONEAL DIALYSIS CATHETER N/A 12/16/2021    Procedure: INSERTION PERITONEAL DIALYSIS CATHETER LAPAROSCOPIC;  Surgeon: Edy Glover MD;  Location: [facility] OR;  Service: General;  Laterality: N/A;    KNEE ARTHROSCOPY Right     ORIF TIBIA FRACTURE Right 12/28/2018    Procedure: TIBIAL  FRACTURE OPEN REDUCTION INTERNAL FIXATION;  Surgeon: Jung Barragan MD;  Location: [facility] OR;  Service: Orthopedics    TOE AMPUTATION Right     5th    TUBAL ABDOMINAL LIGATION       PT Assessment (Last 12 Hours)       PT Evaluation and Treatment       Row Name 08/20/24 1138          Physical Therapy Time and Intention    Subjective Information complains of;pain  -AG     Document Type therapy note (daily note)  -AG     Mode of Treatment physical therapy  -AG     Patient Effort poor  -AG     Symptoms Noted During/After Treatment none  -AG       Row Name 08/20/24 1138          General Information    Patient Profile Reviewed yes  -AG     Patient Observations poorly cooperative  -AG     Patient/Family/Caregiver Comments/Observations pt. L sidelying in bed; AKA is wrapped and appears dry.  -AG     Existing Precautions/Restrictions fall  -AG       Row Name 08/20/24 1130          Bed Mobility    Bed Mobility rolling left;rolling right;scooting/bridging  -AG     Rolling Left [place] (Bed Mobility) dependent (less than 25% patient effort)  -AG     Rolling Right [place] (Bed Mobility) dependent (less than 25% patient effort)  -AG     Scooting/Bridging [place] (Bed Mobility) dependent (less than 25% patient effort)   -AG       Row Name 08/20/24 1138          Motor Skills    Therapeutic Exercise hip;knee;ankle  -AG       Row Name 08/20/24 1138          Hip (Therapeutic Exercise)    Hip (Therapeutic Exercise) PROM (passive range of motion)  -AG     Hip PROM (Therapeutic Exercise) left;flexion;extension;aBduction;aDduction;supine  -AG       Row Name 08/20/24 1138          Knee (Therapeutic Exercise)    Knee (Therapeutic Exercise) PROM (passive range of motion)  -     Knee PROM (Therapeutic Exercise) left;flexion;extension;supine  -AG       Row Name 08/20/24 1138          Ankle (Therapeutic Exercise)    Ankle (Therapeutic Exercise) PROM (passive range of motion)  -AG     Ankle PROM (Therapeutic Exercise) left;dorsiflexion;plantarflexion;supine  -AG       Row Name             Wound 07/26/24 1046 medial sacral spine Pressure Injury    Wound - Properties Group Placement Date: 07/26/24  -YB Placement Time: 1046  -YB Present on Original Admission: Y  -TW Orientation: medial  -TW Location: sacral spine  -YB Primary Wound Type: Pressure inj  -TW    Retired Wound - Properties Group Placement Date: 07/26/24  -YB Placement Time: 1046  -YB Present on Original Admission: Y  -TW Orientation: medial  -TW Location: sacral spine  -YB Primary Wound Type: Pressure inj  -TW    Retired Wound - Properties Group Date first assessed: 07/26/24  -YB Time first assessed: 1046  -YB Present on Original Admission: Y  -TW Location: sacral spine  -YB Primary Wound Type: Pressure inj  -TW      Row Name             Wound 08/09/24 1414 Right distal thigh Incision    Wound - Properties Group Placement Date: 08/09/24  -JG Placement Time: 1414  -JG Side: Right  -JG Orientation: distal  -JG Location: thigh  -JG Primary Wound Type: Incision  -TW    Retired Wound - Properties Group Placement Date: 08/09/24  -JG Placement Time: 1414  -JG Side: Right  -JG Orientation: distal  -JG Location: thigh  -JG Primary Wound Type: Incision  -TW    Retired Wound - Properties Group  "Date first assessed: 08/09/24  -JG Time first assessed: 1414  -JG Side: Right  -JG Location: thigh  -JG Primary Wound Type: Incision  -TW      Row Name 08/20/24 1138          Coping    Observed Emotional State calm;flat  -AG     Verbalized Emotional State sadness  -AG     Trust Relationship/Rapport care explained;choices provided;thoughts/feelings acknowledged  -AG     Family/Support Persons family  -AG     Involvement in Care not present at bedside  -AG     Family/Support System Care support provided  -AG       Row Name 08/20/24 1138          Plan of Care Review    Plan of Care Reviewed With patient  -AG     Outcome Evaluation PT treatment performed; pt. tolerated L LE PROM, did not make effort to perform AROM.  Pt. continues to require total A for all bed mobility.  Will continue to follow.  -AG       Row Name 08/20/24 1138          Positioning and Restraints    Pre-Treatment Position in bed  -AG     Post Treatment Position bed  -AG     In Bed supine;call light within reach;encouraged to call for assist;exit alarm on;side rails up x3;legs elevated  -AG       Row Name 08/20/24 1138          Therapy Assessment/Plan (PT)    Patient/Family Therapy Goals Statement (PT) \"go home\"  -AG               User Key  (r) = Recorded By, (t) = Taken By, (c) = Cosigned By      Initials Name Provider Type    TW Lauren Centeno, RN Registered Nurse    Megan Jiménez, RN Registered Nurse    Anu Solorzano, PT Physical Therapist    Isela Newsome RN Registered Nurse                    Physical Therapy Education       Title: PT OT SLP Therapies (In Progress)       Topic: Physical Therapy (In Progress)       Point: Mobility training (In Progress)       Learning Progress Summary             Patient Acceptance, E,D, NR by AG at 8/20/2024 1138    Acceptance, E,TB, NR by LT at 8/7/2024 0158    Acceptance, E,TB, VU by KT at 8/6/2024 1420                         Point: Home exercise program (In Progress)       Learning " Progress Summary             Patient Acceptance, E,D, NR by AG at 8/20/2024 1138    Acceptance, E,TB, NR by LT at 8/7/2024 0158    Acceptance, E,TB, VU by KT at 8/6/2024 1420                         Point: Body mechanics (In Progress)       Learning Progress Summary             Patient Acceptance, E,D, NR by AG at 8/20/2024 1138    Acceptance, E,TB, NR by LT at 8/7/2024 0158    Acceptance, E,TB, VU by KT at 8/6/2024 1420                         Point: Precautions (In Progress)       Learning Progress Summary             Patient Acceptance, E,D, NR by AG at 8/20/2024 1138    Acceptance, E,TB, NR by LT at 8/7/2024 0158    Acceptance, E,TB, VU by KT at 8/6/2024 1420                                         User Key       Initials Effective Dates Name Provider Type Discipline     06/16/21 -  Anu Chin, PT Physical Therapist PT    LT 05/30/24 -  Oneida Calvo, RN Registered Nurse Nurse     06/21/24 -  Elmer Banks, MARIA C Student PT Student PT                  PT Recommendation and Plan  Anticipated Discharge Disposition (PT): skilled nursing facility  Plan of Care Reviewed With: patient  Outcome Evaluation: PT treatment performed; pt. tolerated L LE PROM, did not make effort to perform AROM.  Pt. continues to require total A for all bed mobility.  Will continue to follow.       Time Calculation:    PT Charges       Row Name 08/20/24 1137             Time Calculation    PT Received On 08/20/24  -                User Key  (r) = Recorded By, (t) = Taken By, (c) = Cosigned By      Initials Name Provider Type     Anu Chin, PT Physical Therapist                  Therapy Charges for Today       Code Description Service Date Service Provider Modifiers Qty    26084733435 HC PT THER PROC EA 15 MIN 8/20/2024 Anu Chin, PT GP 1            PT G-Codes  AM-PAC 6 Clicks Score (PT): 6    Anu Chin PT  8/20/2024      Electronically signed by Anu Chin PT at 08/20/24 1144       Occupational Therapy Notes  (most recent note)    No notes exist for this encounter.       Discharge Summary    No notes of this type exist for this encounter.       Discharge Order (From admission, onward)       Start     Ordered    08/21/24 1002  Discharge patient  Once        Expected Discharge Date: 08/21/24   Expected Discharge Time: Morning   Discharge Disposition: Home or Self Care   Physician of Record for Attribution - Please select from Treatment Team: RYAN FAUSTIN [[id number]]   Review needed by CMO to determine Physician of Record: No      Question Answer Comment   Physician of Record for Attribution - Please select from Treatment Team RYAN FAUSTIN    Review needed by CMO to determine Physician of Record No        08/21/24 1008

## 2024-08-21 NOTE — DISCHARGE PLACEMENT REQUEST
"Reny Merino (64 y.o. Female)       Date of Birth   1960    Social Security Number       Address   [address] [address]    Home Phone   [phone]    MRN   [id number]       Mosque   Tenriism    Marital Status                               Admission Date   8/2/24    Admission Type   Emergency    Admitting Provider   Yandel Ortega DO    Attending Provider   Narayan Mayorga DO    Department, Room/Bed   [address], [facility]4/       Discharge Date       Discharge Disposition   Home or Self Care    Discharge Destination                                 Attending Provider: Narayan Mayorga DO    Allergies: Morphine, Penicillins, Codeine, Sulfa Antibiotics    Isolation: None   Infection: None   Code Status: No CPR    Ht: 162.6 cm (64\")   Wt: 45.5 kg (100 lb 6.4 oz)    Admission Cmt: None   Principal Problem: Diabetic ulcer of right heel [E11.621,L97.419]                   Active Insurance as of 8/2/2024       Primary Coverage       Payor Plan Insurance Group Employer/Plan Group    MEDICARE MEDICARE B ONLY        Payor Plan Address Payor Plan Phone Number Payor Plan Fax Number Effective Dates    [address] [address] [phone]  3/1/2023 - None Entered    [facility] [id number]         Subscriber Name Subscriber Birth Date Member ID       RENY MERINO 1960 [id number]               Secondary Coverage       Payor Plan Insurance Group Employer/Plan Group    [place]        Payor Plan Address Payor Plan Phone Number Payor Plan Fax Number Effective Dates    [address] [phone]  11/7/2022 - None Entered    [facility] [id number]         Subscriber Name Subscriber Birth Date Member ID       RENY MERINO 1960 [id number]                     Emergency Contacts        (Rel.) Home Phone Work Phone Mobile Phone    Abbie ([facility]Cliff (Significant Other) [phone] -- [phone]    Liza Oliva " (Daughter) [phone] -- [phone]                 Discharge Summary        Narayan Mayorga DO at 24 1421              [facility] HOSPITALIST MEDICINE DISCHARGE SUMMARY    Patient Identification:  Name:  Reny Elena  Age:  64 y.o.  Sex:  female  :  1960  MRN:  [id number]  Visit Number:  [id number]    Date of Admission: 2024  Date of Discharge: 2024    PCP: Susan Stephens APRN    DISCHARGE DIAGNOSIS   Septic Shock (POA, now resolved)  Acute Ischemic Hepatitis  ESRD on PD  Severe Protein/Malnutrition  Right Ankle Osteomyelitis s/p Right AKA  Type 2 DM  Acute Blood Loss Anemia  Thrombocytopenia  New Hospice Patient      CONSULTS  DARIUS Bowers, Palliative Care  Dr. Rider, Pulmonlogy  Dr. Glover, General Surgery  Dr. Sandy, Nephrology  Dr. Rubio, ID      PROCEDURES PERFORMED   Patient did undergo right above-knee amputation secondary to nonhealing right heel osteomyelitis on 2024.  Please see procedure note for specific details.  Patient tolerated the procedure well with no postprocedural complications.      HOSPITAL COURSE  Ms. Elena is a 64 y.o. female who presented to [facility] ED on 8/3/2024 with a chief complaint of nausea and vomiting.  Patient has a past medical history remarkable for insulin-dependent type 2 diabetes mellitus, chronic HFpEF, hypothyroidism, seizure disorder, essential hypertension, hyperlipidemia, GERD, end-stage renal disease on peritoneal dialysis and peripheral arterial disease.  Patient reported 1 week history of nausea with vomiting.  She did come to our emergency department on 2024 complaining of hallucinations as well as right foot pain and fever.  Patient was sent home with a prescription for some antibiotic (currently unknown which antibiotic) and was referred to outpatient wound care.  Patient did follow-up with outpatient wound care on 8/3/2024 and right calcaneal wound did appear worsened.  Patient did  state she developed nausea with vomiting after taking oral antibiotics for several days and then stopped taking the antibiotics.  Secondary to worsening wound, patient was referred to the emergency department for further treatment evaluation.  Initial evaluation in the emergency department did consist of basic laboratory work as well as physical exam and vital signs.  Please see initial H&P for specific details.  Patient did have x-ray of right foot obtained which demonstrated diffuse soft tissue swelling.  CT abdomen/pelvis was obtained which demonstrated possible mild distal colitis.  Overall, patient was diagnosed with intractable nausea and vomiting possibly secondary to acute cystitis and was admitted for further treatment and evaluation.    In regards to right heel ulcer, patient was started on IV vancomycin based on wound culture from 7/21/2024 that demonstrated Enterococcus faecalis.  Patient was also started on IV Rocephin for urinary tract infection.  Patient was also started on IV Flagyl secondary to possible underlying colitis.  General surgery services were consulted who did thoroughly evaluate the patient.  After thorough evaluation, patient was diagnosed with clinical osteomyelitis as she did have exposed bone on her right heel.  As patient is nonambulatory and had hardware throughout her right lower extremity, recommendation was made to proceed with right above-knee amputation.  Patient did undergo right above-knee amputation on 8/9/2024 and tolerated the procedure well with no postprocedural complications.  Unfortunately, on 8/12/2024 patient did develop hypotension requiring transfer to the intensive care unit and initiation of vasopressor therapy.  Patient also had transient elevated liver enzymes suspicious for ischemic hepatitis.  Patient was maintained on vasopressor therapy until 8/13/2024 when blood pressure is did improve.  Patient was found to be significantly anasarcic and malnourished.   Patient was encouraged to increase oral intake but unfortunately patient was unable to keep up with her daily caloric needs.  Consideration was had regarding placing Dobbhoff tube but this was delayed/deferred secondary to development of coagulopathy.  Patient did complete a full course of antibiotic therapy secondary to osteomyelitis of right heel status post above-knee amputation.  Focus was then placed on increasing patient's nutrition which patient did have slow improvement in oral intake but never truly reached a steady state of adequate caloric intake.  As patient appeared to be slowly declining, discussion was held with patient and her significant other at bedside regarding overall goals of care.  Palliative care was consulted who had multiple conversations with both the patient and her significant other.  Patient did have a prolonged hospitalization and towards the end of her hospital stay, patient stated she no longer wished to be hospitalized and preferred to transition her goals of care to comfort measures.  As such, patient will be discharged from the hospital today with intentions of enrolling in hospice upon arrival at home.  With this in mind, it is felt patient has reached maximum medical benefit of current hospitalization and she will be discharged home with hospice in stable condition today.  The beforementioned plan was thoroughly discussed with the patient and she expressed understanding and willingness to proceed with the beforementioned plan.    VITAL SIGNS:      08/19/24  0500 08/20/24  0600 08/21/24  0500   Weight: 45.1 kg (99 lb 6.4 oz) (Nik Piña) 42.7 kg (94 lb 3.2 oz) 45.5 kg (100 lb 6.4 oz)     Body mass index is 17.23 kg/m².    PHYSICAL EXAM:  Constitutional: Chronically ill-appearing  female in no apparent distress.     HENT:  Head:  Normocephalic and atraumatic.  Mouth:  Moist mucous membranes.    Eyes:  Conjunctivae and EOM are normal.  Pupils are equal, round, and  reactive to light.  No scleral icterus.    Neck:  Neck supple. No thyromegaly.  No JVD present.    Cardiovascular:  Regular rate and rhythm with no murmurs, rubs, clicks or gallops appreciated.  Pulmonary/Chest:  Clear to auscultation bilaterally with no crackles, wheezes or rhonchi appreciated.  Abdominal:  Soft. Nontender. Nondistended, peritoneal dialysis catheter in place.  Bowel sounds are normal in all four quadrants. No organomegally appreciated.   Musculoskeletal: Patient with diffuse anasarca, no tenderness, patient with right above-knee amputation stump which is clean and dry.  Neurological:  Alert, Cranial nerves II-XII intact with no focal deficits.  No facial droop.  No slurred speech.   Skin:  Warm and dry to palpation with no rashes or lesions appreciated.  Peripheral vascular:  2+ radial and pedal pulses in bilateral upper and lower extremities.  Psychiatric:  Alert and oriented x3, demonstrates appropriate judgment and insight.  DISCHARGE DISPOSITION   Stable    DISCHARGE MEDICATIONS:     Discharge Medications        New Medications        Instructions Start Date   LORazepam 0.5 MG tablet  Commonly known as: Ativan   0.25 mg, Oral, 2 Times Daily PRN      metoprolol tartrate 25 MG tablet  Commonly known as: LOPRESSOR   12.5 mg, Oral, Every 12 Hours Scheduled      oxyCODONE-acetaminophen 7.5-325 MG per tablet  Commonly known as: Percocet   1 tablet, Oral, Every 6 Hours PRN             Changes to Medications        Instructions Start Date   FLUoxetine 10 MG capsule  Commonly known as: PROzac  What changed:   medication strength  how much to take   10 mg, Oral, Daily   Start Date: August 22, 2024            Continue These Medications        Instructions Start Date   atorvastatin 80 MG tablet  Commonly known as: LIPITOR   80 mg, Oral, Nightly      ferrous sulfate 325 (65 FE) MG tablet   325 mg, Oral, Daily With Breakfast      Fiasp 100 UNIT/ML solution  Generic drug: Insulin Aspart (w/Niacinamide)   2-7  Units, Injection, 4 Times Daily Before Meals & Nightly      fluticasone 50 MCG/ACT nasal spray  Commonly known as: FLONASE   2 sprays, Nasal, Daily PRN      folic acid 1 MG tablet  Commonly known as: FOLVITE   1 mg, Oral, Daily      lacosamide 50 MG tablet tablet  Commonly known as: VIMPAT   50 mg, Oral, Every 12 Hours Scheduled      levothyroxine 100 MCG tablet  Commonly known as: SYNTHROID, LEVOTHROID   100 mcg, Oral, Daily      lidocaine 5 %  Commonly known as: LIDODERM   1 patch, Transdermal, Daily PRN, Remove & Discard patch within 12 hours or as directed by MD      ondansetron ODT 4 MG disintegrating tablet  Commonly known as: ZOFRAN-ODT   4 mg, Translingual, 3 Times Daily PRN      pantoprazole 40 MG EC tablet  Commonly known as: PROTONIX   40 mg, Oral, Daily      potassium chloride 20 MEQ CR tablet  Commonly known as: KLOR-CON M20   20 mEq, Oral, 2 Times Daily      rOPINIRole 0.5 MG tablet  Commonly known as: REQUIP   0.5 mg, Oral, 2 Times Daily      tiZANidine 4 MG tablet  Commonly known as: ZANAFLEX   4 mg, Oral, Every 6 Hours PRN      traZODone 50 MG tablet  Commonly known as: DESYREL   50 mg, Oral, Nightly             Stop These Medications      aspirin 81 MG EC tablet     cefdinir 300 MG capsule  Commonly known as: OMNICEF     HYDROcodone-acetaminophen  MG per tablet  Commonly known as: NORCO     hydrOXYzine 25 MG tablet  Commonly known as: ATARAX     losartan 50 MG tablet  Commonly known as: COZAAR     NIFEdipine XL 90 MG 24 hr tablet  Commonly known as: PROCARDIA XL              Diet Instructions       Diet: Diabetic Diets; Consistent Carbohydrate; Regular (IDDSI 7); Thin (IDDSI 0)      Discharge Diet: Diabetic Diets    Diabetic Diet: Consistent Carbohydrate    Texture: Regular (IDDSI 7)    Fluid Consistency: Thin (IDDSI 0)      Regular, Pureed         Your Scheduled Appointments      Aug 22, 2024 3:00 PM  Post-Op with Angelo Santoro MD  [facility]  [facility]) [address] [address] [phone]      Additional instructions:    You have a follow-up appointment with CLAUDIO JETT NP scheduled for 9-3-24 at 10:15, Office can be reached at [phone]         Activity Instructions    Activity as Tolerated      Wound Locations Right AKA stump surgical wound   Wound Care Instructions Paint incision line with betadine.  Cover with non-adherent pads.  Secure with roll gauze and ace wrap.             Follow-up Information       Susan Jett APRN .    Specialty: Nurse Practitioner  Contact information:  [facility] [address] [phone]                             TEST  RESULTS PENDING AT DISCHARGE  Pending Labs       Order Current Status    Fungus Culture - Body Fluid, Peritoneal Catheter Preliminary result             Narayan Faustin DO  08/21/24  14:21 EDT    Please note that this discharge summary required more than 30 minutes to complete.    Please send a copy of this dictation to the following providers:  Susan Jett APRN    Electronically signed by Narayan Faustin DO at 08/21/24 1440       Discharge Order (From admission, onward)       Start     Ordered    08/21/24 1002  Discharge patient  Once        Expected Discharge Date: 08/21/24   Expected Discharge Time: Morning   Discharge Disposition: Home or Self Care   Physician of Record for Attribution - Please select from Treatment Team: NARAYAN FAUSTIN [[id number]]   Review needed by CMO to determine Physician of Record: No      Question Answer Comment   Physician of Record for Attribution - Please select from Treatment Team NARAYAN FAUSTIN    Review needed by CMO to determine Physician of Record No        08/21/24 1009

## 2024-08-21 NOTE — CASE MANAGEMENT/SOCIAL WORK
Discharge Planning Assessment  [facility]     Patient Name: Reny Elena  MRN: [id number]  Today's Date: 8/21/2024    Admit Date: 8/2/2024         Discharge Plan       Row Name 08/21/24 1350       Plan    Final Discharge Disposition Code 50 - home with hospice    Final Note Pt is being discharged home with hospice. SS received Palliative care consult for home with hospice dx failure to thrive, need hospital bed but can go home ahead of bed delivery, contact is [facility] Abbie. [phone]. Pt and Pt's HC surrogate prefer [facility]. SS faxed hospice referral to [facility] fax [phone]. SS to provide RN with report number once hospice confirms acceptance. Pt will need EMS for transport. SS completed PCS form, faxed to floor fax [phone]. SS to arrange EMS for transport.    15:33pm: SS provided Lead RN with report number for [facility] [phone]. SS faxed dc summary to fax [phone].     16:35pm: Per [facility] EMS not available. SS contacted [facility] per Dispatcher Lucy and scheduled transport.                   CAITY Carroll

## 2024-08-21 NOTE — OUTREACH NOTE
Prep Survey      Flowsheet Row Responses   Religious facility [facility] discharged from? [facility]   Is LACE score < 7 ? No   Eligibility Not Eligible   What are the reasons patient is not eligible? Hospice/Pallative Care   Does the patient have one of the following disease processes/diagnoses(primary or secondary)? General Surgery   Prep survey completed? Yes            VERONICA NICK - Registered Nurse

## 2024-08-21 NOTE — DISCHARGE PLACEMENT REQUEST
"Reny Merino (64 y.o. Female)       Date of Birth   1960    Social Security Number       Address   [address] [address]    Home Phone   [phone]    MRN   [id number]       Caodaism   Hinduism    Marital Status                               Admission Date   8/2/24    Admission Type   Emergency    Admitting Provider   Yandel Ortega DO    Attending Provider   Narayan Mayorga DO    Department, Room/Bed   [address], [facility]/       Discharge Date       Discharge Disposition   Home or Self Care    Discharge Destination                                 Attending Provider: Narayan Mayorga DO    Allergies: Morphine, Penicillins, Codeine, Sulfa Antibiotics    Isolation: None   Infection: None   Code Status: No CPR    Ht: 162.6 cm (64\")   Wt: 45.5 kg (100 lb 6.4 oz)    Admission Cmt: None   Principal Problem: Diabetic ulcer of right heel [E11.621,L97.419]                   Active Insurance as of 8/2/2024       Primary Coverage       Payor Plan Insurance Group Employer/Plan Group    MEDICARE MEDICARE B ONLY        Payor Plan Address Payor Plan Phone Number Payor Plan Fax Number Effective Dates    [address] [address] [phone]  3/1/2023 - None Entered    [facility] [id number]         Subscriber Name Subscriber Birth Date Member ID       RENY MERINO 1960 [id number]               Secondary Coverage       Payor Plan Insurance Group Employer/Plan Group    [place]        Payor Plan Address Payor Plan Phone Number Payor Plan Fax Number Effective Dates    [address] [phone]  11/7/2022 - None Entered    [facility] [address]         Subscriber Name Subscriber Birth Date Member ID       RENY MERINO 1960 [id number]                     Emergency Contacts        (Rel.) Home Phone Work Phone Mobile Phone    Abbie ([facility]Cliff (Significant Other) [phone] -- [phone]    Liza Oliva " (Daughter) [phone] -- [phone]          [address] [address]  Phone:  [phone]  Fax:  [phone]        Patient: ROOM: [address] [place] [address]   Reny Elena MRN:  [id number]   [address] [facility] [address] :  1960  SSN:    Phone: [phone] Sex:  F   PCP: Susan Stephens                 Emergency Contact Information       Name Relation Home Work Mobile     Abbie ([facility]Cliff Significant Other [phone]     Liza Oliva Daughter [phone]          INSURANCE PAYOR PLAN GROUP # SUBSCRIBER ID   Primary:  Secondary:    MEDICARE  [place] [id number]  [id number]      [id number]  [id number]   Admitting Diagnosis: Diabetic ulcer of right heel [E11.621, L97.419]  Order Date:  Aug 21, 2024        Case Management  Consult       (Order ID: [id number])     Diagnosis:         Priority:  Routine Expected Date:   Expiration Date:        Interval:   Count:    Reason for Consult: home with hospice dx failure to thrive, need hospital bed but can go home ahead of bed delivery, contact is [facility] Cliff Abbie. [phone]        Authorizing Provider:Jennifer Sanders APRN  Authorizing Provider's NPI: [id number]  Order Entered By: Jennifer Sanders APRN 2024  1:03 PM     Electronically signed by: Jennifer Sanders APRN 2024  1:03 PM          History & Physical        Erwin Tatum MD at 24 0316          Hospitalist History and Physical        Patient Identification  Name: Reny Elena  Age/Sex: 64 y.o. female  :  1960        MRN: [id number]  Visit Number: [id number]  Admit Date: 2024   PCP: Susan Stephens APRN          Chief complaint nausea, vomiting    History of Present Illness:  Patient is a 64 y.o. female with history of arthritis, insulin dependent type II DM, chronic diastolic CHF, hypothyroidism, seizure disorder, HTN, HLD, GERD, iron deficiency anemia,  PAD, and ESRD on PD, who presents with complaints of nausea and vomiting over the last week. Of note, on 7/21/24 she presented to our ED complaining of hallucinations as well as right foot pain and fever. She was noted to have a right heel ulcer and a UTI based on abnormal UA. At that time she admits she was having some dysuria as well. She was sent home with a prescription for an antibiotic (unclear what exactly) and referred to wound care with whom she followed up today. She reports after taking the unknown antibiotic for a few days, she developed nausea and vomiting which has persisted despite stopping the antibiotic when symptoms first started. She denies abdominal pain. She denies any further dysuria. She does report increased pain in the right heel, however. In the ED, patient was tachycardic but other vitals were stable. Labs show K+ at upper limit of normal, BUN 30, cr 4.36, glucose 176, alk phos 323 and albumin 2.9, otherwise normal LFTs. CRP mildly elevated at 2.34, while lactate and WBC are normal. UA shows large leuk esterase and unable to determine RBC, WBC, bacteria of squamous cells due to loaded field. Order for urine to be sent for culture is pending, but urine culture from 7/21/24 grew E coli. Wound culture from that same visit grew moderate mixed gram negative maurisio along with light growth enterococcus faecalis. XR right foot shows diffuse soft tissue swelling. CT abd/pelvis showes possible mild distal colitis. Patient Responded well to zofran earlier during her ED stay, but now is vomiting again. She is receiving a 2nd dose of zofran from the ED provider now and being admitted for further management.     Review of Systems  Review of Systems   Constitutional:  Positive for appetite change. Negative for chills and fever.   HENT:  Negative for postnasal drip, rhinorrhea, sinus pressure and sinus pain.    Respiratory:  Negative for cough, shortness of breath and wheezing.    Cardiovascular:  Negative  for chest pain, palpitations and leg swelling.   Gastrointestinal:  Positive for nausea and vomiting. Negative for abdominal distention, abdominal pain, constipation and diarrhea.   Endocrine: Negative for polyphagia and polyuria.   Genitourinary:  Positive for dysuria. Negative for difficulty urinating, flank pain, frequency and hematuria.   Musculoskeletal:         Right heel pain   Skin:  Positive for wound (ulceration right heel).   Neurological:  Positive for weakness (generalized) and numbness. Negative for dizziness, tremors, seizures, syncope, light-headedness and headaches.   Hematological:  Negative for adenopathy. Does not bruise/bleed easily.   Psychiatric/Behavioral:  Negative for agitation, behavioral problems and confusion.        History  Past Medical History:   Diagnosis Date    Arthritis     Closed fracture of right fibula and tibia 2018    Depression     Diabetic ulcer of left foot associated with type 2 diabetes mellitus 2017    Diastolic dysfunction     Disease of thyroid gland     Elevated cholesterol     End stage renal disease     Essential hypertension     GERD (gastroesophageal reflux disease)     History of transfusion     Hyperlipidemia     Iron deficiency anemia 2018    PAD (peripheral artery disease)     Renal failure     Type 2 diabetes mellitus with hyperglycemia, with long-term current use of insulin 2018     Past Surgical History:   Procedure Laterality Date    ABDOMINAL SURGERY      BACK SURGERY      Post spinal diskectomy, osteophytectomy in one lumbar interspace    CATARACT EXTRACTION      Left      SECTION      DENTAL PROCEDURE      ENDOSCOPY      FRACTURE SURGERY      right leg    HYSTERECTOMY      INCISION AND DRAINAGE LEG Left 4/15/2017    Procedure: INCISION AND DRAINAGE LOWER EXTREMITY;  Surgeon: Basilio Morris MD;  Location: [facility];  Service:     INCISION AND DRAINAGE LEG Left 2017    Procedure: Irrigation and Debridment abcess  diabetic wound left foot with deep culture;  Surgeon: Basilio Morris MD;  Location: [facility] OR;  Service:     INSERTION PERITONEAL DIALYSIS CATHETER N/A 12/16/2021    Procedure: INSERTION PERITONEAL DIALYSIS CATHETER LAPAROSCOPIC;  Surgeon: Edy Glover MD;  Location: [facility] OR;  Service: General;  Laterality: N/A;    KNEE ARTHROSCOPY Right     ORIF TIBIA FRACTURE Right 12/28/2018    Procedure: TIBIAL  FRACTURE OPEN REDUCTION INTERNAL FIXATION;  Surgeon: Jung Barragan MD;  Location: [facility] OR;  Service: Orthopedics    TOE AMPUTATION Right     5th    TUBAL ABDOMINAL LIGATION       Family History   Problem Relation Age of Onset    Heart disease Mother     Cancer Mother     COPD Mother     Diabetes Other     Depression Other      Social History     Tobacco Use    Smoking status: Never     Passive exposure: Never    Smokeless tobacco: Never   Vaping Use    Vaping status: Never Used   Substance Use Topics    Alcohol use: No    Drug use: No     (Not in a hospital admission)    Allergies:  Penicillins, Codeine, and Sulfa antibiotics    Objective    Vital Signs  Temp:  [97.5 °F (36.4 °C)] 97.5 °F (36.4 °C)  Heart Rate:  [] 92  Resp:  [14] 14  BP: (108-154)/(55-89) 135/78  Body mass index is 16.64 kg/m².    Physical Exam:  Physical Exam  Constitutional:       Comments: Frail appearing 63 yo female, looks older than stated age, acutely and chronically ill. No acute distress.    HENT:      Head: Normocephalic and atraumatic.      Right Ear: External ear normal.      Left Ear: External ear normal.      Nose: Nose normal.      Mouth/Throat:      Mouth: Mucous membranes are dry.      Pharynx: Oropharynx is clear.   Eyes:      Extraocular Movements: Extraocular movements intact.      Conjunctiva/sclera: Conjunctivae normal.      Pupils: Pupils are equal, round, and reactive to light.   Cardiovascular:      Rate and Rhythm: Regular rhythm. Tachycardia present.      Pulses: Normal pulses.      Heart sounds: Normal heart  "sounds. No murmur heard.  Pulmonary:      Effort: Pulmonary effort is normal. No respiratory distress.      Breath sounds: Normal breath sounds. No wheezing or rales.   Abdominal:      General: Abdomen is flat. Bowel sounds are normal. There is no distension.      Palpations: Abdomen is soft.      Tenderness: There is no abdominal tenderness.      Comments: PD cathter noted in upper abdomen; insertion site appears dry, clean, intact. Diasylate fluid does not appear cloudy or bloody.    Musculoskeletal:         General: Normal range of motion.      Cervical back: Normal range of motion and neck supple. No tenderness.      Right lower leg: No edema.      Left lower leg: No edema.      Comments: Faint right hand edema compared to left.    Lymphadenopathy:      Cervical: No cervical adenopathy.   Skin:     General: Skin is warm and dry.      Findings: Lesion (right heel, with some surrounding eschar as well as slough. Erythema of surrounding skin noted. Very foul smelling.) present.   Neurological:      General: No focal deficit present.      Mental Status: She is oriented to person, place, and time.   Psychiatric:         Mood and Affect: Mood normal.         Behavior: Behavior normal.           Results Review:       Lab Results:  Results from last 7 days   Lab Units 08/02/24 1916   WBC 10*3/mm3 9.44   HEMOGLOBIN g/dL 13.0   PLATELETS 10*3/mm3 251     Results from last 7 days   Lab Units 08/02/24 1916   CRP mg/dL 2.34*     Results from last 7 days   Lab Units 08/02/24 1916   SODIUM mmol/L 134*   POTASSIUM mmol/L 5.1   CHLORIDE mmol/L 94*   CO2 mmol/L 24.6   BUN mg/dL 30*   CREATININE mg/dL 4.36*   CALCIUM mg/dL 7.6*   GLUCOSE mg/dL 176*         No results found for: \"HGBA1C\"  Results from last 7 days   Lab Units 08/02/24 1916   BILIRUBIN mg/dL 0.4   ALK PHOS U/L 323*   AST (SGOT) U/L 18   ALT (SGPT) U/L 11                       I have reviewed the patient's laboratory results.    Imaging:  Imaging Results (Last 72 " Hours)       Procedure Component Value Units Date/Time    CT Abdomen Pelvis Without Contrast [[id number]] Collected: 08/03/24 0010     Updated: 08/03/24 0021    Narrative:      PROCEDURE: CT of the abdomen and pelvis performed without IV contrast on  August 2, 2024. The examination was performed with 5 mm axial imaging  and 5 mm sagittal and coronal reconstruction images. Total . The  examination was performed according to as low as reasonably achievable  dose protocol.     HISTORY: Vomiting. Peritoneal dialysis.     COMPARISON: None.     FINDINGS:     Moderate degenerative disc disease at the L4-5 and L5-S1 levels  No acute or chronic compression fracture deformity or scoliosis  Mild osteoarthritis at the right and left hip joint.  Normal heart size with no pericardial effusion.  Small bands of scar versus atelectasis in the right middle lobe and  lower lingula.  Small volume of free fluid in the right upper quadrant anterior and  lateral to the right hepatic lobe.  Small volume of free fluid in the lower posterior pelvis.  Mild distended gallbladder likely due to fasting.  Mild chronic bilateral renal cortical volume loss  Fluid-filled bladder with small volume of air in the bladder lumen.  Multiple lower pelvic phleboliths.  No bowel or renal obstruction.  Significant and diffuse aortic and aortic branch segment calcified  plaque in the abdomen and pelvis.  No abdominal aortic aneurysm or retroperitoneal hemorrhage.  Small focus of air identified in the right hemidiaphragm and lower  anterior right chest fat of nonspecific origin. This is seen on image  50, series 4.  Mild stranding in the presacral space possibly due to mild distal  colitis.       Impression:         1.  Peritoneal dialysis catheter noted in the anterior right abdominal  wall with the catheter noted to terminate in the left lateral pelvis.  2.  Small volume of free fluid in the right upper quadrant and lower  posterior pelvis and right  lower quadrant.  3.  Slightly elevated left hemidiaphragm.  4.  No bowel or renal obstruction.  5.  No abdominal aortic aneurysm.  6.  No features of acute appendicitis.  7.  Stranding identified in the presacral space could present changes  related to mild distal colitis.  8.  Fluid-filled bladder with small volume of air in the bladder lumen.  9.  No pneumatosis.  10.  No conclusive features of free air.  11.  Distended configuration to the gallbladder likely due to fasting.  No conclusive features of gastritis or enteritis.        This report was finalized on 8/3/2024 12:19 AM by Carlos Andrews MD.       XR Foot 2 View Right [[id number]] Collected: 08/02/24 1953     Updated: 08/02/24 2011    Narrative:      INDICATION: Foot pain.     TECHNIQUE: 2 views of the right foot.     COMPARISON: No relevant priors.       Impression:      FINDINGS/IMPRESSION:    Partial amputation of the fifth metatarsal. Bony demineralization. No  acute fracture or dislocation. No lytic or blastic bony lesions seen.  Diffuse interphalangeal joint space loss. Mild degenerative changes in  the midfoot. No cortical erosion. Fixation timur in the  tibia/talus/calcaneus noted.  Diffuse soft tissue swelling. Vascular calcifications. No soft tissue  gas.        This report was finalized on 8/2/2024 7:54 PM by Alex Pallas, DO.               I have personally reviewed the patient's radiologic imaging.        EKG: ordered, results pending        Assessment & Plan    - Nausea, vomiting, suspect secondary to partially treated UTI, possible side effects of antibiotic recently started, possible colitis though no abdominal pain or tenderness, and infected diabetic right heel ulcer. Treat heel ulcer with IV vancomycin based on wound culture from 7/21 that grew enteroccus faecalis. Treat UTI with IV rocephin based on recent urine culture that grew E coli. Flagyl on board to cover possible colitis. Continue to trend WBC, CRP. Follow up repeat urine and blood  cultures. Consult general surgery to evaluate right heel ulcer for debridement. Keep NPO for now since still nauseated and vomiting anyhow and because surgery may wish to proceed with debridement in the morning.   - ESRD on PD: consult nephrology to set up PD while in-house.  - Type II DM, insulin dependent: check A1c. Monitor accuchecks. Cover with SSI.     DVT Prophylaxis: SQ heparin    Estimated Length of Stay >2 midnights    I discussed the patient's findings, assessment and plan with the patient and ED provider Dr Mckeon.    Erwin Tatum MD  08/03/24  03:16 EDT      Electronically signed by Erwin Tatum MD at 08/03/24 0340       Lab Results (most recent)       Procedure Component Value Units Date/Time    POC Glucose Once [[id number]]  (Abnormal) Collected: 08/21/24 1130    Specimen: Blood Updated: 08/21/24 1139     Glucose 254 mg/dL     POC Glucose Once [[id number]]  (Abnormal) Collected: 08/21/24 0716    Specimen: Blood Updated: 08/21/24 0724     Glucose 158 mg/dL     CBC & Differential [[id number]]  (Abnormal) Collected: 08/21/24 0328    Specimen: Blood Updated: 08/21/24 0426    Narrative:      The following orders were created for panel order CBC & Differential.  Procedure                               Abnormality         Status                     ---------                               -----------         ------                     CBC Auto Differential[[id number]]        Abnormal            Final result               Scan Slide[[id number]]                                       Final result                 Please view results for these tests on the individual orders.    Scan Slide [[id number]] Collected: 08/21/24 0328    Specimen: Blood Updated: 08/21/24 0426     Anisocytosis Mod/2+     Dacrocytes Slight/1+     Platelet Estimate Decreased    CBC Auto Differential [[id number]]  (Abnormal) Collected: 08/21/24 0328    Specimen: Blood Updated: 08/21/24 0426     WBC 3.47 10*3/mm3      RBC 3.24  10*6/mm3      Hemoglobin 9.5 g/dL      Hematocrit 28.2 %      MCV 87.0 fL      MCH 29.3 pg      MCHC 33.7 g/dL      RDW 18.0 %      RDW-SD 54.5 fl      MPV 11.7 fL      Platelets 38 10*3/mm3      Neutrophil % 63.1 %      Lymphocyte % 21.6 %      Monocyte % 12.4 %      Eosinophil % 2.6 %      Basophil % 0.0 %      Immature Grans % 0.3 %      Neutrophils, Absolute 2.19 10*3/mm3      Lymphocytes, Absolute 0.75 10*3/mm3      Monocytes, Absolute 0.43 10*3/mm3      Eosinophils, Absolute 0.09 10*3/mm3      Basophils, Absolute 0.00 10*3/mm3      Immature Grans, Absolute 0.01 10*3/mm3      nRBC 0.0 /100 WBC     CBC & Differential [[id number]]  (Abnormal) Collected: 08/20/24 0421    Specimen: Blood Updated: 08/20/24 0451    Narrative:      The following orders were created for panel order CBC & Differential.  Procedure                               Abnormality         Status                     ---------                               -----------         ------                     CBC Auto Differential[[id number]]        Abnormal            Final result               Scan Slide[[id number]]                                       Final result                 Please view results for these tests on the individual orders.    CBC Auto Differential [[id number]]  (Abnormal) Collected: 08/20/24 0421    Specimen: Blood Updated: 08/20/24 0451     WBC 3.89 10*3/mm3      RBC 3.75 10*6/mm3      Hemoglobin 11.1 g/dL      Hematocrit 31.8 %      MCV 84.8 fL      MCH 29.6 pg      MCHC 34.9 g/dL      RDW 17.6 %      RDW-SD 52.6 fl      MPV 10.0 fL      Platelets 35 10*3/mm3      Neutrophil % 74.0 %      Lymphocyte % 15.7 %      Monocyte % 8.5 %      Eosinophil % 1.3 %      Basophil % 0.0 %      Immature Grans % 0.5 %      Neutrophils, Absolute 2.88 10*3/mm3      Lymphocytes, Absolute 0.61 10*3/mm3      Monocytes, Absolute 0.33 10*3/mm3      Eosinophils, Absolute 0.05 10*3/mm3      Basophils, Absolute 0.00 10*3/mm3      Immature Grans, Absolute 0.02  10*3/mm3      nRBC 0.0 /100 WBC     Scan Slide [[id number]] Collected: 08/20/24 0421    Specimen: Blood Updated: 08/20/24 0451     Anisocytosis Slight/1+     Platelet Estimate Decreased    Comprehensive Metabolic Panel [[id number]]  (Abnormal) Collected: 08/20/24 0421    Specimen: Blood Updated: 08/20/24 0448     Glucose 112 mg/dL      BUN 40 mg/dL      Creatinine 2.80 mg/dL      Sodium 136 mmol/L      Potassium 2.8 mmol/L      Chloride 101 mmol/L      CO2 24.6 mmol/L      Calcium 6.9 mg/dL      Total Protein 4.0 g/dL      Albumin 1.9 g/dL      ALT (SGPT) 25 U/L      AST (SGOT) 53 U/L      Alkaline Phosphatase 372 U/L      Total Bilirubin 0.4 mg/dL      Globulin 2.1 gm/dL      A/G Ratio 0.9 g/dL      BUN/Creatinine Ratio 14.3     Anion Gap 10.4 mmol/L      eGFR 18.1 mL/min/1.73     Narrative:      GFR Normal >60  Chronic Kidney Disease <60  Kidney Failure <15      Potassium [[id number]]  (Abnormal) Collected: 08/19/24 1324    Specimen: Blood Updated: 08/19/24 1440     Potassium 2.9 mmol/L      Comment: Slight hemolysis detected by analyzer. Result may be falsely elevated.       Magnesium [[id number]]  (Normal) Collected: 08/19/24 0153    Specimen: Blood Updated: 08/19/24 0802     Magnesium 2.0 mg/dL     Comprehensive Metabolic Panel [[id number]]  (Abnormal) Collected: 08/19/24 0153    Specimen: Blood Updated: 08/19/24 0309     Glucose 188 mg/dL      BUN 37 mg/dL      Creatinine 2.80 mg/dL      Sodium 138 mmol/L      Potassium 2.7 mmol/L      Comment: Slight hemolysis detected by analyzer. Result may be falsely elevated.        Chloride 102 mmol/L      CO2 23.5 mmol/L      Calcium 7.0 mg/dL      Total Protein 4.4 g/dL      Albumin 2.1 g/dL      ALT (SGPT) 26 U/L      AST (SGOT) 77 U/L      Alkaline Phosphatase 488 U/L      Total Bilirubin 0.5 mg/dL      Globulin 2.3 gm/dL      A/G Ratio 0.9 g/dL      BUN/Creatinine Ratio 13.2     Anion Gap 12.5 mmol/L      eGFR 18.1 mL/min/1.73     Narrative:      GFR Normal >60  Chronic  Kidney Disease <60  Kidney Failure <15      Basic Metabolic Panel [[id number]]  (Abnormal) Collected: 08/18/24 1553    Specimen: Blood Updated: 08/18/24 1637     Glucose 84 mg/dL      BUN 34 mg/dL      Creatinine 2.83 mg/dL      Sodium 137 mmol/L      Potassium 2.7 mmol/L      Comment: Slight hemolysis detected by analyzer. Result may be falsely elevated.        Chloride 102 mmol/L      CO2 22.1 mmol/L      Calcium 6.8 mg/dL      BUN/Creatinine Ratio 12.0     Anion Gap 12.9 mmol/L      eGFR 17.9 mL/min/1.73     Narrative:      GFR Normal >60  Chronic Kidney Disease <60  Kidney Failure <15      Calcium, Ionized [[id number]]  (Abnormal) Collected: 08/18/24 1554    Specimen: Blood Updated: 08/18/24 1623     Ionized Calcium 0.81 mmol/L     Body Fluid Culture - Body Fluid, Peritoneum [[id number]] Collected: 08/13/24 1253    Specimen: Body Fluid from Peritoneum Updated: 08/18/24 1300     Body Fluid Culture No growth at 5 days     Gram Stain WBCs seen      No organisms seen    Blood Culture - Blood, Arm, Left [[id number]]  (Normal) Collected: 08/12/24 1917    Specimen: Blood from Arm, Left Updated: 08/17/24 1931     Blood Culture No growth at 5 days    Clostridioides difficile Toxin - Stool, Per Rectum [[id number]] Collected: 08/17/24 1546    Specimen: Stool from Per Rectum Updated: 08/17/24 1700    Narrative:      The following orders were created for panel order Clostridioides difficile Toxin - Stool, Per Rectum.  Procedure                               Abnormality         Status                     ---------                               -----------         ------                     Clostridioides difficile...[[id number]]                      Final result                 Please view results for these tests on the individual orders.    Clostridioides difficile Toxin, PCR - Stool, Per Rectum [[id number]] Collected: 08/17/24 1546    Specimen: Stool from Per Rectum Updated: 08/17/24 1700     Toxigenic C. difficile by PCR Negative      027 Toxin Presumptive Negative    Narrative:      The result indicates the absence of toxigenic C. difficile from stool specimen.     Serotonin Release Assay [[id number]] Collected: 08/14/24 1149    Specimen: Blood Updated: 08/17/24 1412     MARIA L, Low Dose Heparin 1 %      MARIA L, High Dose Heparin 3 %      MARIA L, Intepretation Comment     Comment: MARIA L Result:  NEGATIVE  COMMENT:  While these results argue against a diagnosis of heparin-  induced-thrombocytopenia (HIT), they do not completely exclude the  diagnosis.  The result should be interpreted in conjunction with other  HIT assays, and the context of all the clinical information including  the platelet count, the type of heparin administered, the duration of  heparin exposure, previous heparin exposure and any thrombotic  history.  The assay measures serotonin release from donor platelets  in the presence of patient's serum and heparin.  A positive result  requires greater than or equal to 20% release in the presence of  low dose (0.2 IU/mL) heparin and inhibition of serotonin release  in the presence of high dose (100 IU/mL) heparin.       Narrative:      Test(s) 717743-XYF, Low Dose Heparin; 826996-AVM, High Dose Heparin  was developed and its performance characteristics determined  by [facility]. It has not been cleared or approved by the Food  and Drug Administration.  Performed at:   - [address]  [id number]  : Carlos Bergman MD, Phone:  [phone]    Basic Metabolic Panel [[id number]]  (Abnormal) Collected: 08/17/24 0708    Specimen: Blood Updated: 08/17/24 0800     Glucose 163 mg/dL      BUN 28 mg/dL      Creatinine 2.55 mg/dL      Sodium 134 mmol/L      Potassium 3.4 mmol/L      Comment: Slight hemolysis detected by analyzer. Result may be falsely elevated.        Chloride 101 mmol/L      CO2 21.4 mmol/L      Calcium 7.0 mg/dL      BUN/Creatinine Ratio 11.0     Anion Gap 11.6 mmol/L      eGFR 20.2  mL/min/1.73     Narrative:      GFR Normal >60  Chronic Kidney Disease <60  Kidney Failure <15      Heparin-induced Platelet Antibody [[id number]] Collected: 24 1149    Specimen: Blood Updated: 24 1810     Heparin Induced Plt Ab 0.094 OD     Narrative:      Performed at:  [address] [place] [address]  [id number]  : Carlos Bergman MD, Phone:  [phone]    Vancomycin, Random [[id number]]  (Normal) Collected: 24 0646    Specimen: Blood Updated: 24 0751     Vancomycin Random 25.50 mcg/mL     Narrative:      Therapeutic Ranges for Vancomycin    Vancomycin Random   5.0-40.0 mcg/mL  Vancomycin Trough   5.0-20.0 mcg/mL  Vancomycin Peak     20.0-40.0 mcg/mL    Magnesium [[id number]]  (Normal) Collected: 24 0028    Specimen: Blood Updated: 24 0131     Magnesium 2.2 mg/dL     Phosphorus [[id number]]  (Normal) Collected: 24 0028    Specimen: Blood Updated: 24 0128     Phosphorus 2.6 mg/dL     Vitamin D,25-Hydroxy [[id number]]  (Abnormal) Collected: 24 2315    Specimen: Blood Updated: 08/15/24 1312     25 Hydroxy, Vitamin D 10.9 ng/ml     Narrative:      Reference Range for Total Vitamin D 25(OH)     Deficiency <20.0 ng/mL   Insufficiency 21-29 ng/mL   Sufficiency  ng/mL  Toxicity >100 ng/ml      PERIPHERAL SMEAR, [facility] [[id number]] Collected: 24 2347    Specimen: Blood Updated: 08/15/24 1159     Reference Lab Report --     [facility]  [address] [place] [address]  Phone: [phone] or [phone]  Fax: [phone]  Tru Mcdonald M.D., Medical Director    PATIENT NAME                                 LABORATORY NO.  [id number]   KELLIE MERINO                             [id number]  [id number]  [facility]                          AGE                SEX     SSN            CLIENT REF #  [id number]       1960   F       [id number]    [id number]  [address] [address]                                REQUESTING M.D.       ATTENDING M.D..        COPY TO..  JOHNIE GONZALEZ PATRICIA JOHNNY    DATE COLLECTED        DATE RECEIVED          DATE REPORTED  08/13/2024            08/14/2024             08/15/2024    DIAGNOSIS:  PERIPHERAL SMEAR  Normocytic, normochromic anemia.  No significant increase in schistocytes  population.  Thrombocytopenia with no platelet clumping.  Normal WBC count and differential. Granulocytes show normal morphology and  no circulating blasts identified.      CLINICAL HISTORY:  Diabetic ulcer of right heel associated with type 2 diabetes mellitus, with bone  involvement without evidence of necrosis, peritoneal dialysis finding, nausea and  vomiting, unspecified vomiting type, acute cystitis with hematuria, ulcer of right  foot, unspecified ulcer stage    CLINICAL LABORATORY DATA  WBC        5.07 x10/3/-L    RDW         16.0%  HGB        7.1 g/dl         PLT         101 x10/3/-L  HCT        21.3%            LYMPHS      6.5%  MCV        93.0fL           NEUTS       90.1%  MONO        3.0%    PERIPHERAL SMEAR MICROSCOPIC DESCRIPTION:  Collier stained smears are reviewed microscopically. See diagnosis for details.    Professional interpretation rendered by Cadence Jain M.D., MPH at [facility], [address] [place] [address].    GROSS DESCRIPTION:  RECEIVED 1 STAINED AND 1 UNSTAINED SLIDES - MG 8/14/2024    REVIEWED, DIAGNOSED AND ELECTRONICALLY  SIGNED BY:    Cadence Jain M.D., MPH  CPT CODES:        02944      [facility] [[id number]]  (Normal) Collected: 08/15/24 0901    Specimen: Blood Updated: 08/15/24 1029    Narrative:      The following orders were created for panel order [facility].  Procedure                               Abnormality         Status                     ---------                               -----------         ------                     Hepatitis C Antibody[[id number]]         Normal               Final result               Hepatitis B Surface Antigen[[id number]]  Normal              Final result               HIV-1 / O / 2 Ag / Antibody[[id number]]  Normal              Final result                 Please view results for these tests on the individual orders.    HIV-1 / O / 2 Ag / Antibody [[id number]]  (Normal) Collected: 08/15/24 0901    Specimen: Blood Updated: 08/15/24 1029     HIV-1/ HIV-2 Non-Reactive     Comment: A non-reactive test result does not preclude the possibility of exposure to HIV or infection with HIV. An antibody response to recent exposure may take several months to reach detectable levels.       Narrative:      The HIV antibody/antigen combo assay is a qualitative assay for HIV that includes the p24 antigen as well as antibodies to HIV types 1 and 2. This test is intended to be used as a screening assay in the diagnosis of HIV infection in patients over the age of 2.    Protime-INR [[id number]]  (Abnormal) Collected: 08/15/24 0901    Specimen: Blood Updated: 08/15/24 1018     Protime 12.0 Seconds      INR 0.88    Narrative:      Suggested INR therapeutic range for stable oral anticoagulant therapy:    Low Intensity therapy:   1.5-2.0  Moderate Intensity therapy:   2.0-3.0  High Intensity therapy:   2.5-4.0    Hepatitis C Antibody [[id number]]  (Normal) Collected: 08/15/24 0901    Specimen: Blood Updated: 08/15/24 1017     Hepatitis C Ab Non-Reactive    Hepatitis B Surface Antigen [[id number]]  (Normal) Collected: 08/15/24 0901    Specimen: Blood Updated: 08/15/24 1012     Hepatitis B Surface Ag Non-Reactive    Vancomycin, Random [[id number]]  (Normal) Collected: 08/15/24 0901    Specimen: Blood Updated: 08/15/24 1002     Vancomycin Random 27.40 mcg/mL     Narrative:      Therapeutic Ranges for Vancomycin    Vancomycin Random   5.0-40.0 mcg/mL  Vancomycin Trough   5.0-20.0 mcg/mL  Vancomycin Peak     20.0-40.0 mcg/mL    Phosphorus [[id number]]  (Normal) Collected: 08/15/24 0901    Specimen: Blood  Updated: 08/15/24 1002     Phosphorus 3.4 mg/dL     Blood Gas, Venous With Co-Ox [[id number]]  (Abnormal) Collected: 08/15/24 0954    Specimen: Venous Blood Updated: 08/15/24 0955     Site Nurse/Dr Draw     pH, Venous 7.477 pH Units      pCO2, Venous 35.2 mm Hg      Comment: 84 Value below reference range        pO2, Venous 41.2 mm Hg      HCO3, Venous 26.0 mmol/L      Base Excess, Venous 2.5 mmol/L      O2 Saturation, Venous 83.4 %      Comment: 83 Value above reference range        Hemoglobin, Blood Gas 10.8 g/dL      Comment: 84 Value below reference range        CO2 Content 27.1 mmol/L      Barometric Pressure for Blood Gas 729 mmHg      Modality Room Air     FIO2 21 %      Ventilator Mode NA     Collected by [id number]     Comment: Meter: [id number]     :  [id number]        Oxyhemoglobin Venous 81.4 %      Comment: 83 Value above reference range        Carboxyhemoglobin Venous 1.8 %      Methemoglobin Venous 0.6 %     T4, Free [[id number]]  (Normal) Collected: 08/14/24 2314    Specimen: Blood Updated: 08/14/24 2357     Free T4 1.08 ng/dL     Narrative:      Results may be falsely increased if patient taking Biotin.      Protime-INR [[id number]]  (Abnormal) Collected: 08/14/24 2314    Specimen: Blood Updated: 08/14/24 2344     Protime 14.6 Seconds      INR 1.13    Narrative:      Suggested INR therapeutic range for stable oral anticoagulant therapy:    Low Intensity therapy:   1.5-2.0  Moderate Intensity therapy:   2.0-3.0  High Intensity therapy:   2.5-4.0    Fibrinogen [[id number]]  (Normal) Collected: 08/14/24 2314    Specimen: Blood Updated: 08/14/24 2344     Fibrinogen 214 mg/dL     aPTT [[id number]]  (Abnormal) Collected: 08/14/24 2314    Specimen: Blood Updated: 08/14/24 2344     PTT 43.3 seconds     Narrative:      PTT Heparin Therapeutic Range:  45 - 116 seconds      CBC (No Diff) [[id number]]  (Abnormal) Collected: 08/14/24 2314    Specimen: Blood Updated: 08/14/24 2333     WBC 4.31 10*3/mm3      RBC  2.03 10*6/mm3      Hemoglobin 6.3 g/dL      Hematocrit 18.9 %      MCV 93.1 fL      MCH 31.0 pg      MCHC 33.3 g/dL      RDW 15.5 %      RDW-SD 53.1 fl      MPV 9.8 fL      Platelets 79 10*3/mm3     Calcium, Ionized [[id number]]  (Abnormal) Collected: 24 2314    Specimen: Blood Updated: 24 2329     Ionized Calcium 0.95 mmol/L     Fungus Culture - Body Fluid, Peritoneal Catheter [[id number]] Collected: 24 1253    Specimen: Body Fluid from Peritoneal Catheter Updated: 24 1908     Fungus Stain Final report     KOH/Calcofluor preparation Comment     Comment: KOH/Calcofluor preparation:  no fungus observed.       Narrative:      Performed at:  [address] [place] [address]  [id number]  : Mahendra Finney PhD, Phone:  [phone]    Haptoglobin [[id number]]  (Normal) Collected: 24 2347    Specimen: Blood Updated: 24 1225     Haptoglobin 61 mg/dL      Comment: Specimen hemolyzed. Results may be affected.       TISSUE EXAM, [facility] ([facility]) [[id number]] Collected: 24 1412    Specimen: Tissue from Leg, Right Updated: 24 1139     Reference Lab Report --     [facility]  [address] [place] [address]  Phone: [phone] or [phone]  Fax: [phone]  Tru Mcdonald M.D., Medical Director    PATIENT NAME                           LABORATORY NO.  786  KELLIE MERINO                       [id number]  [id number]                         AGE              SEX  SSN           CLIENT REF #  [facility] [place]              64      1960  F    [id number]   [id number]    [address]                     REQUESTING M.D.     ATTENDING [place] [address]                   OREN LIND  DATE COLLECTED      DATE RECEIVED      DATE REPORTED  2024    DIAGNOSIS:  EXTREMITY, AMPUTATION, NON - TRAUMATIC, ABOVE KNEE RIGHT LEG:  Acute  "osteomyelitis  Severe ulceration  Partially occlusive calcific atherosclerosis  Examined soft tissue and bone margins appear viable  No identified malignancy    CLINICAL HISTORY:  Diabetic ulcer of right heel associated with type 2 diabetes mellitus, with bone  involvement without evidence of necrosis    SPECIMENS RECEIVED:  EXTREMITY, AMPUTATION, NON - TRAUMATIC , ABOVE KNEE RIGHT LEG    MICROSCOPIC DESCRIPTION:  Tissue blocks are prepared and slides are examined microscopically on all  specimens. See diagnosis for details.    Professional interpretation rendered by Shari Alcocer M.D. at [facility],  [facility], [address] [place] [address].    GROSS DESCRIPTION:  Received fresh labeled \"above knee right leg\" is a 50 cm long right above-the-  knee amputation specimen including 8 cm of exposed, viable femur at the  proximal end.  The attached foot is 23 cm long and is status post a fifth toe  amputation.  The remaining 4 digits have yellow, mildly thickened nails.  On the  plantar surface/heel is an 8 x 3.5 cm green, purulent and diffusely necrotic,  nonhealing wound with focally exposed calcaneus.  The proximal skin, soft  tissue and bone margins are viable and free of tumor.  The popliteal artery,  anterior and posterior tibial vessels have minimal atherosclerotic plaque  formation luminal stenosis.  Representative sections are submitted as follows:  A1: Proximal skin and soft tissue margin en face  A2: Nonhealing wound with underlying calcaneus following decalcification  A3: Popliteal and posterior tibial vessels  A4: Proximal marrow margin  HDM    REVIEWED, DIAGNOSED AND ELECTRONICALLY  SIGNED BY:    Shari Alcocer M.D.  CPT CODES:  74387, 76222      aPTT [[id number]]  (Abnormal) Collected: 08/14/24 0702    Specimen: Blood Updated: 08/14/24 0745     PTT 96.4 seconds     Narrative:      PTT Heparin Therapeutic Range:  45 - 116 seconds      Hemoglobin & Hematocrit, Blood [[id number]]  (Abnormal) Collected: " 08/14/24 0452    Specimen: Blood Updated: 08/14/24 0521     Hemoglobin 9.1 g/dL      Hematocrit 27.4 %     Blood Gas, Arterial With Co-Ox [[id number]]  (Abnormal) Collected: 08/14/24 0503    Specimen: Arterial Blood Updated: 08/14/24 0503     Site Left Brachial     Óscar's Test N/A     pH, Arterial 7.411 pH units      pCO2, Arterial 34.6 mm Hg      pO2, Arterial 72.0 mm Hg      Comment: 84 Value below reference range        HCO3, Arterial 21.9 mmol/L      Base Excess, Arterial -2.3 mmol/L      O2 Saturation, Arterial 96.2 %      Hemoglobin, Blood Gas 9.0 g/dL      Comment: 84 Value below reference range        Hematocrit, Blood Gas 27.6 %      Comment: 84 Value below reference range        Oxyhemoglobin 93.5 %      Comment: 84 Value below reference range        Methemoglobin 0.60 %      Carboxyhemoglobin 2.2 %      A-a DO2 32.2 mmHg      CO2 Content 23.0 mmol/L      Barometric Pressure for Blood Gas 729 mmHg      Modality Room Air     FIO2 21 %      Ventilator Mode NA     Collected by [id number]     Comment: Meter: [id number]     :  [id number]        pH, Temp Corrected --     pCO2, Temperature Corrected --     pO2, Temperature Corrected --    TSH [[id number]]  (Normal) Collected: 08/13/24 2332    Specimen: Blood Updated: 08/14/24 0020     TSH 3.580 uIU/mL     Fibrinogen [[id number]]  (Normal) Collected: 08/13/24 2154    Specimen: Blood Updated: 08/13/24 2358     Fibrinogen 183 mg/dL     Lactic Acid, Plasma [[id number]]  (Normal) Collected: 08/13/24 2154    Specimen: Blood Updated: 08/13/24 2231     Lactate 1.5 mmol/L     Blood Gas, Arterial With Co-Ox [[id number]]  (Abnormal) Collected: 08/13/24 2217    Specimen: Arterial Blood Updated: 08/13/24 2219     Site Left Brachial     Óscar's Test N/A     pH, Arterial 7.399 pH units      pCO2, Arterial 36.0 mm Hg      pO2, Arterial 77.2 mm Hg      Comment: 84 Value below reference range        HCO3, Arterial 22.2 mmol/L      Base Excess, Arterial -2.3 mmol/L      O2  Saturation, Arterial 96.9 %      Hemoglobin, Blood Gas 6.9 g/dL      Comment: 85 Value below critical limit        Hematocrit, Blood Gas 21.1 %      Comment: 84 Value below reference range        Oxyhemoglobin 95.3 %      Methemoglobin <-0.10 %      Comment: 94 Value below reportable range < _0.1        Carboxyhemoglobin 2.2 %      A-a DO2 25.4 mmHg      CO2 Content 23.3 mmol/L      Barometric Pressure for Blood Gas 729 mmHg      Modality Room Air     FIO2 21 %      Ventilator Mode NA     Notified Who GLORIA GALINDO/ DR GONZALEZ     Notified By [id number]     Notified Time 08/13/2024 22:19     Collected by [id number]     Comment: Meter: [id number]     :  [id number]        pH, Temp Corrected --     pCO2, Temperature Corrected --     pO2, Temperature Corrected --    T4, Free [[id number]]  (Abnormal) Collected: 08/13/24 0004    Specimen: Blood Updated: 08/13/24 1405     Free T4 1.88 ng/dL     Narrative:      Results may be falsely increased if patient taking Biotin.      Body Fluid Cell Count With Differential - Body Fluid, Peritoneum [[id number]] Collected: 08/13/24 1253    Specimen: Body Fluid from Peritoneum Updated: 08/13/24 1318    Narrative:      The following orders were created for panel order Body Fluid Cell Count With Differential - Body Fluid, Peritoneum.  Procedure                               Abnormality         Status                     ---------                               -----------         ------                     Body fluid cell count - ...[[id number]]                      Final result                 Please view results for these tests on the individual orders.    Body fluid cell count - Body Fluid, Peritoneum [[id number]] Collected: 08/13/24 1253    Specimen: Body Fluid from Peritoneum Updated: 08/13/24 1318     Color, Fluid Colorless     Appearance, Fluid Clear     RBC, Fluid --     Comment: Not performed per protocol.           Nucleated Cells, Fluid 5 /mm3     Narrative:      The reference  "interval(s) and other method performance specifications are unavailable for this body fluid. Comparison of the result with concentration in the blood, serum, or plasma is recommended.  Differential not indicated.    STAT Lactic Acid, Reflex [[id number]]  (Abnormal) Collected: 08/13/24 0616    Specimen: Blood Updated: 08/13/24 0648     Lactate 2.5 mmol/L     STAT Lactic Acid, Reflex [[id number]]  (Abnormal) Collected: 08/13/24 0005    Specimen: Blood Updated: 08/13/24 0051     Lactate 3.5 mmol/L     C-reactive Protein [[id number]]  (Abnormal) Collected: 08/12/24 1918    Specimen: Blood Updated: 08/12/24 2007     C-Reactive Protein 1.65 mg/dL     Procalcitonin [[id number]]  (Abnormal) Collected: 08/12/24 1918    Specimen: Blood Updated: 08/12/24 1953     Procalcitonin 74.29 ng/mL     Narrative:      As a Marker for Sepsis (Non-Neonates):    1. <0.5 ng/mL represents a low risk of severe sepsis and/or septic shock.  2. >2 ng/mL represents a high risk of severe sepsis and/or septic shock.    As a Marker for Lower Respiratory Tract Infections that require antibiotic therapy:    PCT on Admission    Antibiotic Therapy       6-12 Hrs later    >0.5                Strongly Recommended  >0.25 - <0.5        Recommended   0.1 - 0.25          Discouraged              Remeasure/reassess PCT  <0.1                Strongly Discouraged     Remeasure/reassess PCT    As 28 day mortality risk marker: \"Change in Procalcitonin Result\" (>80% or <=80%) if Day 0 (or Day 1) and Day 4 values are available. Refer to http://www.[employer]-pct-calculator.com    Change in PCT <=80%  A decrease of PCT levels below or equal to 80% defines a positive change in PCT test result representing a higher risk for 28-day all-cause mortality of patients diagnosed with severe sepsis for septic shock.    Change in PCT >80%  A decrease of PCT levels of more than 80% defines a negative change in PCT result representing a lower risk for 28-day all-cause mortality of " patients diagnosed with severe sepsis or septic shock.       Lactic Acid, Plasma [[id number]]  (Abnormal) Collected: 08/12/24 0721    Specimen: Blood Updated: 08/12/24 0817     Lactate 2.9 mmol/L     Urinalysis, Microscopic Only - Straight Cath [[id number]]  (Abnormal) Collected: 08/11/24 1813    Specimen: Urine from Straight Cath Updated: 08/11/24 1907     RBC, UA 0-2 /HPF      WBC, UA 3-5 /HPF      Comment: Urine culture not indicated.        Bacteria, UA None Seen /HPF      Squamous Epithelial Cells, UA 0-2 /HPF      Hyaline Casts, UA None Seen /LPF      Methodology Manual Light Microscopy    Urinalysis With Culture If Indicated - Straight Cath [[id number]]  (Abnormal) Collected: 08/11/24 1813    Specimen: Urine from Straight Cath Updated: 08/11/24 1839     Color, UA Dark Yellow     Appearance, UA Clear     pH, UA <=5.0     Specific Gravity, UA 1.018     Glucose,  mg/dL (1+)     Ketones, UA Negative     Bilirubin, UA Negative     Blood, UA Negative     Protein,  mg/dL (2+)     Leuk Esterase, UA Small (1+)     Nitrite, UA Negative     Urobilinogen, UA 0.2 E.U./dL    Narrative:      In absence of clinical symptoms, the presence of pyuria, bacteria, and/or nitrites on the urinalysis result does not correlate with infection.    Ammonia [[id number]]  (Normal) Collected: 08/11/24 1714    Specimen: Blood Updated: 08/11/24 1754     Ammonia 22 umol/L     Hemoglobin & Hematocrit, Blood [[id number]]  (Abnormal) Collected: 08/11/24 1714    Specimen: Blood Updated: 08/11/24 1734     Hemoglobin 8.6 g/dL      Hematocrit 27.0 %     Narrative:      Post transfusion specimen.     MRSA Screen, PCR (Inpatient) - Swab, Nares [[id number]]  (Normal) Collected: 08/11/24 1557    Specimen: Swab from Nares Updated: 08/11/24 1716     MRSA PCR No MRSA Detected    Narrative:      The negative predictive value of this diagnostic test is high and should only be used to consider de-escalating anti-MRSA therapy. A positive result may  indicate colonization with MRSA and must be correlated clinically.    CK [[id number]]  (Normal) Collected: 08/11/24 0744    Specimen: Blood Updated: 08/11/24 1037     Creatine Kinase 90 U/L     Ferritin [[id number]]  (Abnormal) Collected: 08/11/24 0744    Specimen: Blood Updated: 08/11/24 0851     Ferritin 13,659.00 ng/mL     Narrative:      Results may be falsely decreased if patient taking Biotin.      Iron Profile [[id number]]  (Abnormal) Collected: 08/11/24 0744    Specimen: Blood Updated: 08/11/24 0824     Iron 76 mcg/dL      Iron Saturation (TSAT) >101 %      Transferrin 38 mg/dL      TIBC 57 mcg/dL     Vancomycin, Trough [[id number]]  (Normal) Collected: 08/11/24 0744    Specimen: Blood Updated: 08/11/24 0824     Vancomycin Trough 18.70 mcg/mL     Narrative:      Therapeutic Ranges for Vancomycin    Vancomycin Random   5.0-40.0 mcg/mL  Vancomycin Trough   5.0-20.0 mcg/mL  Vancomycin Peak     20.0-40.0 mcg/mL    CBC (No Diff) [[id number]]  (Abnormal) Collected: 08/09/24 0049    Specimen: Blood Updated: 08/09/24 0242     WBC 5.34 10*3/mm3      RBC 3.09 10*6/mm3      Hemoglobin 9.5 g/dL      Hematocrit 31.6 %      .3 fL      MCH 30.7 pg      MCHC 30.1 g/dL      RDW 14.0 %      RDW-SD 51.8 fl      MPV 9.8 fL      Platelets 149 10*3/mm3     Wound Culture - Wound, Foot, Right [[id number]]  (Abnormal)  (Susceptibility) Collected: 08/03/24 1420    Specimen: Wound from Foot, Right Updated: 08/08/24 0728     Wound Culture Moderate growth (3+) Mixed Gram Negative Vannessa      Moderate growth (3+) Enterococcus faecalis     Gram Stain No WBCs seen      Few (2+) Gram positive cocci in pairs      Rare (1+) Gram negative bacilli    Susceptibility        Enterococcus faecalis      DAVID      Ampicillin Susceptible      Vancomycin Susceptible                           Blood Culture - Blood, Arm, Right [[id number]]  (Normal) Collected: 08/02/24 2257    Specimen: Blood from Arm, Right Updated: 08/07/24 2315     Blood Culture No  growth at 5 days    Blood Culture - Blood, Arm, Right [[id number]]  (Normal) Collected: 08/02/24 2257    Specimen: Blood from Arm, Right Updated: 08/07/24 2315     Blood Culture No growth at 5 days    High Sensitivity Troponin T 2Hr [[id number]]  (Abnormal) Collected: 08/06/24 2031    Specimen: Blood Updated: 08/06/24 2058     HS Troponin T 121 ng/L      Troponin T Delta -3 ng/L     Narrative:      High Sensitive Troponin T Reference Range:  <14.0 ng/L- Negative Female for AMI  <22.0 ng/L- Negative Male for AMI  >=14 - Abnormal Female indicating possible myocardial injury.  >=22 - Abnormal Male indicating possible myocardial injury.   Clinicians would have to utilize clinical acumen, EKG, Troponin, and serial changes to determine if it is an Acute Myocardial Infarction or myocardial injury due to an underlying chronic condition.         High Sensitivity Troponin T [[id number]]  (Abnormal) Collected: 08/06/24 1817    Specimen: Blood Updated: 08/06/24 1921     HS Troponin T 124 ng/L     Narrative:      High Sensitive Troponin T Reference Range:  <14.0 ng/L- Negative Female for AMI  <22.0 ng/L- Negative Male for AMI  >=14 - Abnormal Female indicating possible myocardial injury.  >=22 - Abnormal Male indicating possible myocardial injury.   Clinicians would have to utilize clinical acumen, EKG, Troponin, and serial changes to determine if it is an Acute Myocardial Infarction or myocardial injury due to an underlying chronic condition.         Urine Culture - Urine, Urine, Clean Catch [[id number]]  (Abnormal)  (Susceptibility) Collected: 08/03/24 0302    Specimen: Urine, Clean Catch Updated: 08/05/24 1027     Urine Culture 25,000 CFU/mL Escherichia coli    Narrative:      Colonization of the urinary tract without infection is common. Treatment is discouraged unless the patient is symptomatic, pregnant, or undergoing an invasive urologic procedure.    Susceptibility        Escherichia coli      DAVID      Amikacin Susceptible       Amoxicillin + Clavulanate Susceptible      Ampicillin Resistant      Ampicillin + Sulbactam Intermediate      Cefazolin Susceptible      Cefepime Susceptible      Ceftazidime Susceptible      Ceftriaxone Susceptible      Gentamicin Resistant      Levofloxacin Resistant      Nitrofurantoin Susceptible      Piperacillin + Tazobactam Susceptible      Tobramycin Resistant      Trimethoprim + Sulfamethoxazole Resistant                           C-reactive Protein [[id number]]  (Abnormal) Collected: 08/04/24 0216    Specimen: Blood Updated: 08/04/24 0803     C-Reactive Protein 1.86 mg/dL     Hepatitis Panel, Acute [[id number]]  (Normal) Collected: 08/03/24 1422    Specimen: Blood Updated: 08/03/24 1616     Hepatitis B Surface Ag Non-Reactive     Hep A IgM Non-Reactive     Hep B C IgM Non-Reactive     Hepatitis C Ab Non-Reactive    Narrative:      Results may be falsely decreased if patient taking Biotin.     Hemoglobin A1c [[id number]]  (Abnormal) Collected: 08/02/24 1916    Specimen: Blood from Arm, Right Updated: 08/03/24 0405     Hemoglobin A1C 6.10 %     Narrative:      Hemoglobin A1C Ranges:    Increased Risk for Diabetes  5.7% to 6.4%  Diabetes                     >= 6.5%  Diabetic Goal                < 7.0%    [place] Urine Culture Tube - Urine, Clean Catch [[id number]] Collected: 08/02/24 2052    Specimen: Urine, Clean Catch Updated: 08/02/24 2107     Extra Tube Hold for add-ons.     Comment: Auto resulted.       Urinalysis, Microscopic Only - Urine, Clean Catch [[id number]]  (Abnormal) Collected: 08/02/24 2052    Specimen: Urine, Clean Catch Updated: 08/02/24 2107     RBC, UA       Unable to determine due to loaded field     /HPF     WBC, UA       Unable to determine due to loaded field     /HPF     Bacteria, UA       Unable to determine due to loaded field     /HPF     Squamous Epithelial Cells, UA       Unable to determine due to loaded field     /HPF     Hyaline Casts, UA       Unable to determine due to loaded  field     /LPF     Methodology Manual Light Microscopy    Urinalysis With Microscopic If Indicated (No Culture) - Urine, Clean Catch [[id number]]  (Abnormal) Collected: 08/02/24 2052    Specimen: Urine, Clean Catch Updated: 08/02/24 2102     Color, UA Yellow     Appearance, UA Turbid     pH, UA 7.5     Specific Gravity, UA 1.015     Glucose,  mg/dL (1+)     Ketones, UA Negative     Bilirubin, UA Negative     Blood, UA Moderate (2+)     Protein,  mg/dL (2+)     Leuk Esterase, UA Large (3+)     Nitrite, UA Negative     Urobilinogen, UA 0.2 E.U./dL    Lipase [[id number]]  (Normal) Collected: 08/02/24 1916    Specimen: Blood from Arm, Right Updated: 08/02/24 1941     Lipase 25 U/L     [place] Draw [[id number]] Collected: 08/02/24 1916    Specimen: Blood from Arm, Right Updated: 08/02/24 1931    Narrative:      The following orders were created for panel order [place] Draw.  Procedure                               Abnormality         Status                     ---------                               -----------         ------                     Green Top (Gel)[[id number]]                                  Final result               Lavender Top[[id number]]                                     Final result               Gold Top - SST[[id number]]                                   Final result               Light Blue Top[[id number]]                                   Final result                 Please view results for these tests on the individual orders.    Green Top (Gel) [[id number]] Collected: 08/02/24 1916    Specimen: Blood from Arm, Right Updated: 08/02/24 1931     Extra Tube Hold for add-ons.     Comment: Auto resulted.       Lavender Top [[id number]] Collected: 08/02/24 1916    Specimen: Blood from Arm, Right Updated: 08/02/24 1931     Extra Tube hold for add-on     Comment: Auto resulted       Gold Top - SST [[id number]] Collected: 08/02/24 1916    Specimen: Blood from Arm, Right Updated: 08/02/24 1931      Extra Tube Hold for add-ons.     Comment: Auto resulted.       Light Blue Top [[id number]] Collected: 08/02/24 1916    Specimen: Blood from Arm, Right Updated: 08/02/24 1931     Extra Tube Hold for add-ons.     Comment: Auto resulted             Imaging Results (Most Recent)       Procedure Component Value Units Date/Time    CT Abdomen Pelvis Without Contrast [[id number]] Collected: 08/17/24 1709     Updated: 08/17/24 1716    Narrative:      PROCEDURE: CT of the abdomen and pelvis performed without IV contrast on  August 17, 2024. Examination was performed with 5 mm axial imaging and 5  mm sagittal and coronal reconstruction images. The examination was  performed according to as low as reasonably achievable dose protocol.     HISTORY: Pain. Vomiting.     COMPARISON: None.     FINDINGS:     Normal heart size with trace volume of pericardial fluid.  Small left pleural effusion with compressive atelectasis at the left  lung base.  Small volume of free fluid in the abdomen and pelvis.  No acute process seen in the liver, spleen, gallbladder, pancreas,  adrenal glands, and kidneys.  Mild chronic bilateral renal cortical volume loss.  Peritoneal dialysis catheter identified in the left lateral aspect of  the abdomen.  Moderate stranding in the subcutaneous fat consistent with edema.  No free air.  Dense calcifications identified throughout the aortic abdominal and  pelvic branch segments.  No acute process seen in the bladder.  No free air, abscess, or hematoma.  Mild edematous appearance to the large and small bowel due to peritoneal  fluid.       Impression:         1.  Mild chronic renal cortical volume loss  2.  Significant volume of dense calcifications throughout the abdominal  aortic and pelvic aortic branch segments consistent with atherosclerotic  disease.  3.  Small left pleural effusion with left lower lobe atelectasis.  4.  Small volume of fluid in the abdomen and pelvis with peritoneal  dialysis catheter noted  to terminate in the lateral left abdomen.  5.  Moderate stranding in the subcutaneous fat consistent with edema in  the abdominal wall and pelvic wall extending into the proximal right and  left lower extremity.  6.  Degenerative disc disease throughout the lumbar spine with endplate  and facet hypertrophic changes.  7.  No free air, abscess, or hematoma.     This report was finalized on 8/17/2024 5:13 PM by Carlos Andrews MD.       CT Head Without Contrast [[id number]] Collected: 08/14/24 0059     Updated: 08/14/24 0101    Narrative:      Noncontrast CT brain     Indications: Altered mental status     Technique: Noncontrast CT images of the brain were obtained.  Limited  exposure control, adjustment of the MA and/or KV according to patient  size or use of an iterative reconstruction technique was utilized.     Findings CT brain:     Comparison is made to the prior study dated 4/28/2024.     Examination is somewhat limited by patient positioning.  Axial images  are not true brain axial images.  Allowing for this, there is no  evidence of acute intercranial hemorrhage.  The ventricles are  prominent, stable from the prior study.  Patchy and confluent areas of  hypodensity involve the periventricular deep white matter compatible  sequelae of chronic small vessel ischemic changes.  There is no  mass-effect or midline shift.  No abnormal extra-axial fluid collections  are demonstrated.       Impression:      Impression:     No CT evidence of an acute intracranial process.     This report was finalized on 8/14/2024 12:59 AM by Chin Chowdhury MD.       CT Chest Without Contrast Diagnostic [[id number]] Collected: 08/13/24 1955     Updated: 08/13/24 2010    Narrative:      Procedure: Contiguous axial images were obtained through the chest  abdomen and pelvis without intravenous contrast.  The use of sagittal  coronal reformations are supplied.     Comparison: CT abdomen 8/22/2024, chest 4/28/2024     Findings     CHEST:  Chest is well-expanded with diffuse groundglass attenuation  throughout all lobes.     A small left pleural effusion is present with airspace consolidation.     Trachea and mainstem bronchi are patent.     Central venous catheter present with distal tip at the atrial caval  junction.  Heart size within normal limits.     No adenopathy in the chest.     Cachexia and anasarca noted.     In bone windows, sternum, thoracic vertebral bodies and ribs are intact.   No pneumothorax.     ABDOMEN/PELVIS: Moderate ascites present.     Diffuse anasarca noted.  A catheter enters the mid abdomen, to the left  of midline with distal tip coiled in the left mid abdomen.     There may be fluid surrounding the gallbladder which is not  well-defined.     No extraluminal gas.  Urinary bladder distends normally.  A large amount  of free fluid in the pelvis.  Uterus is not identified.  A small amount  of formed stool present in the colon.  A discrete appendix is not  identified.     The noncontrast enhanced liver, spleen, stomach and adrenals are  morphologically unremarkable.  Kidneys are atrophic with calcified  vascular structures.     No dilated loops of bowel or bowel obstruction.     Above-the-knee amputation noted on the  view.     Moderate degenerative change at L5-S1.       Impression:         1.  Small to moderate left pleural effusion with adjacent airspace  consolidation suggesting atelectasis or pneumonia.     2.  Diffuse groundglass attenuation of the lungs compatible with  alveolitis.     3.   Mildly distended gallbladder with indistinct appearance.  If acute  cholecystitis is of clinical concern, right upper quadrant ultrasound  could be considered.     4.  Anasarca              This report was finalized on 8/13/2024 8:08 PM by Regina Magaña MD.       CT Abdomen Pelvis Without Contrast [[id number]] Collected: 08/13/24 1955     Updated: 08/13/24 2010    Narrative:      Procedure: Contiguous axial images were obtained  through the chest  abdomen and pelvis without intravenous contrast.  The use of sagittal  coronal reformations are supplied.     Comparison: CT abdomen 8/22/2024, chest 4/28/2024     Findings     CHEST: Chest is well-expanded with diffuse groundglass attenuation  throughout all lobes.     A small left pleural effusion is present with airspace consolidation.     Trachea and mainstem bronchi are patent.     Central venous catheter present with distal tip at the atrial caval  junction.  Heart size within normal limits.     No adenopathy in the chest.     Cachexia and anasarca noted.     In bone windows, sternum, thoracic vertebral bodies and ribs are intact.   No pneumothorax.     ABDOMEN/PELVIS: Moderate ascites present.     Diffuse anasarca noted.  A catheter enters the mid abdomen, to the left  of midline with distal tip coiled in the left mid abdomen.     There may be fluid surrounding the gallbladder which is not  well-defined.     No extraluminal gas.  Urinary bladder distends normally.  A large amount  of free fluid in the pelvis.  Uterus is not identified.  A small amount  of formed stool present in the colon.  A discrete appendix is not  identified.     The noncontrast enhanced liver, spleen, stomach and adrenals are  morphologically unremarkable.  Kidneys are atrophic with calcified  vascular structures.     No dilated loops of bowel or bowel obstruction.     Above-the-knee amputation noted on the  view.     Moderate degenerative change at L5-S1.       Impression:         1.  Small to moderate left pleural effusion with adjacent airspace  consolidation suggesting atelectasis or pneumonia.     2.  Diffuse groundglass attenuation of the lungs compatible with  alveolitis.     3.   Mildly distended gallbladder with indistinct appearance.  If acute  cholecystitis is of clinical concern, right upper quadrant ultrasound  could be considered.     4.  Anasarca              This report was finalized on 8/13/2024  8:08 PM by Regina Magaña MD.       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     COMPARISON:    8/11/2024     FINDINGS:    LUNGS AND PLEURAL SPACES:  Coarsened interstitial markings noted  throughout the lungs.  No consolidation.  No pneumothorax.    HEART:  Unremarkable as visualized.  No cardiomegaly.    MEDIASTINUM:  Unremarkable as visualized.  Normal mediastinal contour.    BONES/JOINTS:  Unremarkable as visualized.  No acute fracture.    TUBES, LINES AND DEVICES:  Left subclavian central venous catheter tip  in the superior vena cava.       Impression:        No acute cardiopulmonary findings identified.        This report was finalized on 8/12/2024 4:30 PM by Dr. Harrison Biswas MD.       US Liver [[id number]] Collected: 08/11/24 1655     Updated: 08/11/24 1659    Narrative:      PROCEDURE: Abdominal ultrasound evaluation performed on August 11, 2024.  Color flow imaging performed.     HISTORY: Elevated liver function tests.     COMPARISON: None.     FINDINGS:     Echogenic liver suggestive of fatty infiltration.  Small volume of free fluid adjacent to the liver consistent with  ascites.  Patent hepatic veins with appropriate direction of flow.  Patent main portal vein with appropriate direction of flow.  No hepatic mass  No dilated intrahepatic bile ducts  Sludge noted in the gallbladder lumen.  No gallbladder wall thickening or  para cholecystic fluid.  No right renal hydronephrosis  No features of cholecystitis.       Impression:         1.  Echogenic liver suggestive of fatty infiltration.  2.  Patent main portal vein with appropriate direction of flow.  3.  Patent hepatic veins with appropriate direction of flow.  4.  Sludge noted in the gallbladder lumen.  5.  No features of cholecystitis.  6.  Small volume of free fluid in the right upper quadrant consistent  with ascites.  7.  No hepatic mass or cyst  8.  No dilated intrahepatic bile ducts.     This report was finalized on 8/11/2024 4:57 PM by Carlos Andrews MD.       XR Chest 1 View [[id number]] Collected: 08/11/24 1408     Updated: 08/11/24 1412    Narrative:      PROCEDURE: X-ray examination of the chest performed on August 11, 2024.  Single view. Upright position.     HISTORY: Vomiting.     COMPARISON: None.     FINDINGS:     Normal heart size  Coarsened bronchovascular pattern to the lungs  No lobar consolidation or edema.  No pleural effusion or pneumothorax  No fracture or foreign body.       Impression:         1.  Normal heart size  2.  Coarsened bronchovascular pattern to the lungs  3.  No lobar consolidation or edema.  4.  No pleural effusion or pneumothorax  5.  Mild osteoarthritis at the glenohumeral joints.     This report was finalized on 8/11/2024 2:09 PM by Carlos Andrews MD.       CT Head Without Contrast [[id number]] Collected: 08/11/24 1406     Updated: 08/11/24 1410    Narrative:      PROCEDURE: CT of the brain performed without IV contrast on August 11, 2024. The examination was performed with 3 mm axial imaging and 3 mm  sagittal and coronal reconstruction images. The examination was  performed according to as low as reasonably achievable dose protocol.     HISTORY: Lethargy.     COMPARISON: None.     FINDINGS:     Mild to moderate generalized brain volume loss  Mild to moderate chronic small vessel ischemic type changes in the white  matter.  No acute  intracranial hemorrhage, mass, infarct, or edema.  No hydrocephalus  No shift of midline structures.  No fracture or foreign body.  Minimal mucosal thickening in the ethmoid air cells.  Clear mastoid air cells.       Impression:         1.  Mild to moderate generalized brain volume loss  2.  Mild to moderate chronic small vessel ischemic type changes in the  white matter.  3.  No acute intracranial hemorrhage, mass, infarct, or edema.  4.  Minimal mucosal thickening in the ethmoid air cells.  5.  No fracture or foreign body.     This report was finalized on 8/11/2024 2:08 PM by Carlos Andrews MD.       CT Lower Extremity Right Without Contrast [[id number]] Collected: 08/03/24 1116     Updated: 08/03/24 1123    Narrative:      VERIFICATION OBSERVER NAME: Roberta Valerio MD.     Technique: Axial images were obtained along with coronal and sagittal  reconstruction. DLP in mGycm reported in the EMR records. Dose lowering  technique: Automated exposure control with adjustment of the MA and/or  KV, use of iterative reconstruction. Data included in the medical  records.     HISTORY/COMPARISON/FINDINGS:     Comparison: None.     HISTORY: Diabetic ulcer RIGHT heel     Findings:      Hardware extending from the tibia into the talus and calcaneus.  No bony erosion.  Defect noted in the calcaneus from previous hardware removal.  No air bubbles noted in the soft tissues.  No discrete fluid collection.       Impression:      No definite evidence of abscess formation.  Bony defect noted in the  calcaneus likely related to previous hardware removal.              [id number]  Zip code [id number]                 This report was finalized on 8/3/2024 11:21 AM by Dr. Roberta Valerio MD.       CT Abdomen Pelvis Without Contrast [[id number]] Collected: 08/03/24 0010     Updated: 08/03/24 0021    Narrative:      PROCEDURE: CT of the abdomen and pelvis performed without IV contrast on  August 2, 2024. The examination was performed with 5 mm axial  imaging  and 5 mm sagittal and coronal reconstruction images. Total . The  examination was performed according to as low as reasonably achievable  dose protocol.     HISTORY: Vomiting. Peritoneal dialysis.     COMPARISON: None.     FINDINGS:     Moderate degenerative disc disease at the L4-5 and L5-S1 levels  No acute or chronic compression fracture deformity or scoliosis  Mild osteoarthritis at the right and left hip joint.  Normal heart size with no pericardial effusion.  Small bands of scar versus atelectasis in the right middle lobe and  lower lingula.  Small volume of free fluid in the right upper quadrant anterior and  lateral to the right hepatic lobe.  Small volume of free fluid in the lower posterior pelvis.  Mild distended gallbladder likely due to fasting.  Mild chronic bilateral renal cortical volume loss  Fluid-filled bladder with small volume of air in the bladder lumen.  Multiple lower pelvic phleboliths.  No bowel or renal obstruction.  Significant and diffuse aortic and aortic branch segment calcified  plaque in the abdomen and pelvis.  No abdominal aortic aneurysm or retroperitoneal hemorrhage.  Small focus of air identified in the right hemidiaphragm and lower  anterior right chest fat of nonspecific origin. This is seen on image  50, series 4.  Mild stranding in the presacral space possibly due to mild distal  colitis.       Impression:         1.  Peritoneal dialysis catheter noted in the anterior right abdominal  wall with the catheter noted to terminate in the left lateral pelvis.  2.  Small volume of free fluid in the right upper quadrant and lower  posterior pelvis and right lower quadrant.  3.  Slightly elevated left hemidiaphragm.  4.  No bowel or renal obstruction.  5.  No abdominal aortic aneurysm.  6.  No features of acute appendicitis.  7.  Stranding identified in the presacral space could present changes  related to mild distal colitis.  8.  Fluid-filled bladder with small  volume of air in the bladder lumen.  9.  No pneumatosis.  10.  No conclusive features of free air.  11.  Distended configuration to the gallbladder likely due to fasting.  No conclusive features of gastritis or enteritis.        This report was finalized on 8/3/2024 12:19 AM by Carlos Andrews MD.       XR Foot 2 View Right [[id number]] Collected: 24     Updated: 24    Narrative:      INDICATION: Foot pain.     TECHNIQUE: 2 views of the right foot.     COMPARISON: No relevant priors.       Impression:      FINDINGS/IMPRESSION:    Partial amputation of the fifth metatarsal. Bony demineralization. No  acute fracture or dislocation. No lytic or blastic bony lesions seen.  Diffuse interphalangeal joint space loss. Mild degenerative changes in  the midfoot. No cortical erosion. Fixation timur in the  tibia/talus/calcaneus noted.  Diffuse soft tissue swelling. Vascular calcifications. No soft tissue  gas.        This report was finalized on 2024 7:54 PM by Alex Pallas, DO.                Operative/Procedure Notes (most recent note)        Edy Glover MD at 24 1632          Dx Hypotension    Physician Dr Glover    Procedure  The left shoulder was prepped and draped. Local was injected near the clavicle. The subclavian vein was accessed and the wire threaded in. The dilator was placed and the catheter threaded in . It was sutured in and a dressing applied. There was good blood return and it flushed easily.     Electronically signed by Edy Glover MD at 24 1634          Physician Progress Notes (most recent note)        Narayan Mayorga DO at 24 1806              [facility]     Patient Identification:  Name:  Reny Elena  Age:  64 y.o.  Sex:  female  :  1960  MRN:  [id number]  Visit Number:  [id number]  Primary Care Provider:  Susan Stephens APRN    Length of stay:  17    Chief complaint: None    Subjective:     Patient seen and examined at bedside with no nursing staff present. Patient was awake and staring at the wall when I entered the room. Patient appeared depressed, and made little eye contact during the exam. Patient does appear depressed and tearful, but states she currently has no complaints. Patient states she just wants to go home.  ----------------------------------------------------------------------------------------------------------------------  Current Hospital Meds:  atorvastatin, 80 mg, Oral, Nightly  cholecalciferol, 50,000 Units, Oral, Q7 Days  ferrous sulfate, 325 mg, Oral, Daily With Breakfast  FLUoxetine, 10 mg, Oral, Daily  folic acid, 1 mg, Oral, Daily  gentamicin, 1 Application, Topical, Daily  insulin glargine, 10 Units, Subcutaneous, Daily  insulin lispro, 2-9 Units, Subcutaneous, 4x Daily AC & at Bedtime  lacosamide, 50 mg, Oral, Q12H  lansoprazole, 30 mg, Oral, Q AM  levothyroxine, 100 mcg, Oral, QAM AC  magic barrier cream, , Topical, BID  metoprolol tartrate, 12.5 mg, Oral, Q12H  potassium chloride, 40 mEq, Oral, Daily  sodium chloride, 10 mL, Intravenous, Q12H  sodium chloride, 10 mL, Intravenous, Q12H  sodium chloride, 10 mL, Intravenous, Q12H  sodium chloride, 10 mL, Intravenous, Q12H  sodium chloride, 10 mL, Intravenous, Q12H  traZODone, 25 mg, Oral, Nightly         ----------------------------------------------------------------------------------------------------------------------  Vital Signs:  Temp:  [98 °F (36.7 °C)-98.9 °F (37.2 °C)] 98.9 °F (37.2 °C)  Heart Rate:  [63-76] 76  Resp:  [16-18] 16  BP: ()/(52-97) 139/68      08/17/24  0600 08/19/24  0500 08/20/24  0600   Weight: 48.1 kg (106 lb 0.7 oz) 45.1 kg (99 lb 6.4 oz) (Nik Piña) 42.7 kg (94 lb 3.2 oz)     Body mass index is 16.17 kg/m².    Intake/Output Summary (Last 24 hours) at 8/20/2024 1806  Last data filed at 8/20/2024 1700  Gross per 24 hour   Intake 1200 ml   Output 1276 ml   Net -76 ml     Diet:  Regular/House; Feeding Assistance - Nursing; Texture: Pureed (NDD 1); Fluid Consistency: Thin (IDDSI 0)  ----------------------------------------------------------------------------------------------------------------------  Physical exam:  Constitutional: Chronically ill-appearing  female in no apparent distress.     HENT:  Head:  Normocephalic and atraumatic.  Mouth:  Moist mucous membranes.    Eyes:  Conjunctivae and EOM are normal.  Pupils are equal, round, and reactive to light.  No scleral icterus.    Neck:  Neck supple. No thyromegaly.  No JVD present.    Cardiovascular:  Regular rate and rhythm with no murmurs, rubs, clicks or gallops appreciated.  Pulmonary/Chest:  Clear to auscultation bilaterally with no crackles, wheezes or rhonchi appreciated.  Abdominal:  Soft. Nontender. Nondistended, peritoneal dialysis catheter in place.  Bowel sounds are normal in all four quadrants. No organomegally appreciated.   Musculoskeletal: Patient with diffuse anasarca, no tenderness, and no deformity.  No red or swollen joints anywhere.    Neurological:  Alert, Cranial nerves II-XII intact with no focal deficits.  No facial droop.  No slurred speech.   Skin:  Warm and dry to palpation with no rashes or lesions appreciated.  Peripheral vascular:  2+ radial and pedal pulses in bilateral upper and lower extremities.  Psychiatric:  Alert and oriented x3, demonstrates appropriate judgment and insight.    Little change in physical exam in comparison to 8/19/2024  ------------------------------------------------------------------  ----------------------------------------------------------------------------------------------------------------------      Results from last 7 days   Lab Units 08/20/24  0421 08/19/24  0153 08/18/24  1553 08/15/24  1116 08/15/24  0901 08/14/24  2314 08/14/24  1521 08/14/24  0702 08/13/24  2329 08/13/24  2154   LACTATE mmol/L  --   --   --   --   --   --   --   --   --  1.5   WBC 10*3/mm3 3.89  "4.27 4.33   < > 3.97 4.31  --  5.70   < >  --    HEMOGLOBIN g/dL 11.1* 12.1 11.4*   < > 11.4* 6.3*  --  9.2*   < >  --    HEMATOCRIT % 31.8* 34.9 33.3*   < > 33.6* 18.9*  --  26.8*   < >  --    MCV fL 84.8 84.3 85.6   < > 85.1 93.1  --  92.7   < >  --    MCHC g/dL 34.9 34.7 34.2   < > 33.9 33.3  --  34.3   < >  --    PLATELETS 10*3/mm3 35* 32* 36*   < > 61* 79*  --  104*   < >  --    INR   --   --   --   --  0.88* 1.13* 1.20* 1.46*  --  2.13*    < > = values in this interval not displayed.     Results from last 7 days   Lab Units 08/14/24  0503   PH, ARTERIAL pH units 7.411   PO2 ART mm Hg 72.0*   PCO2, ARTERIAL mm Hg 34.6*   HCO3 ART mmol/L 21.9     Results from last 7 days   Lab Units 08/20/24  0421 08/19/24  1324 08/19/24  0153 08/18/24  1553 08/16/24  0646 08/16/24  0028 08/15/24  0901   SODIUM mmol/L 136  --  138 137   < > 137 137   POTASSIUM mmol/L 2.8* 2.9* 2.7* 2.7*   < > 3.3* 3.1*   MAGNESIUM mg/dL  --   --  2.0  --   --  2.2 1.3*   CHLORIDE mmol/L 101  --  102 102   < > 99 98   CO2 mmol/L 24.6  --  23.5 22.1   < > 22.0 20.9*   BUN mg/dL 40*  --  37* 34*   < > 25* 23   CREATININE mg/dL 2.80*  --  2.80* 2.83*   < > 2.61* 2.54*   CALCIUM mg/dL 6.9*  --  7.0* 6.8*   < > 7.2* 7.4*   GLUCOSE mg/dL 112*  --  188* 84   < > 216* 163*   ALBUMIN g/dL 1.9*  --  2.1*  --   --  2.5* 2.9*   BILIRUBIN mg/dL 0.4  --  0.5  --   --  0.6 1.2   ALK PHOS U/L 372*  --  488*  --   --  496* 422*   AST (SGOT) U/L 53*  --  77*  --   --  258* 400*   ALT (SGPT) U/L 25  --  26  --   --  30 32    < > = values in this interval not displayed.   Estimated Creatinine Clearance: 13.3 mL/min (A) (by C-G formula based on SCr of 2.8 mg/dL (H)).    No results found for: \"AMMONIA\"      Blood Culture   Date Value Ref Range Status   08/12/2024 No growth at 5 days  Final     No results found for: \"URINECX\"  No results found for: \"WOUNDCX\"  No results found for: \"STOOLCX\"    I have personally looked at the labs and they are summarized " above.  ----------------------------------------------------------------------------------------------------------------------  Imaging Results (Last 24 Hours)       ** No results found for the last 24 hours. **          ----------------------------------------------------------------------------------------------------------------------  Assessment and Plan:    Septic shock (present on admission, resolved) -likely multifactorial from right lower extremity/right ankle osteomyelitis in combination with volume shifts from ESRD and severe hypoalbuminemia from severe protein calorie malnutrition.    2.  Acute ischemic hepatitis -slowly improving, will repeat CMP in the morning    3.  ESRD on PD -appreciate nephrology recommendations, continue PD per their recommendations.    4.  Severe malnutrition -appreciate nutrition recommendations, will continue Novasource renal shakes on a regular basis.  Continue to encourage oral intake    5.  Right ankle osteomyelitis - s/p right BKA, currently doing well, patient has completed full course of antibiotic therapy with vancomycin and Zosyn.  Continue to monitor off antibiotic therapy.    6.  Chronic debility    7.  Type 2 diabetes mellitus -continue Accu-Cheks before every meal and nightly with sliding scale insulin    8.  Metabolic encephalopathy -resolved    9.  Acute blood loss anemia -hemoglobin now stable.  Continue to monitor closely and transfuse for hemoglobin less than 7    10.  Thrombocytopenia -HIT panel currently pending, no active bleeding, no need for platelet transfusion at this time.  Will transfuse for platelet count less than 10,000 or if actively bleeding less than 50,000.    Disposition: Will likely be discharged in the next 24 hours    Narayan Mayorga DO   08/20/24   18:06 EDT       Electronically signed by Narayan Mayorga DO at 08/20/24 1831          Consult Notes (most recent note)        Jennifer Sanders APRN at 08/14/24 1818         Consult Orders    1. Inpatient Palliative Care MD Consult [[id number]] ordered by Johnson Rider MD at 08/14/24 0901                 Palliative Care Initial Consult     Attending Physician: Rebeka Woodruff MD  Referring Provider: Ashu Woodruff MD    assistance with advance directives, assistance with clarification of goals of care, and psychosocial support  Code Status:   Code Status and Medical Interventions: No CPR (Do Not Attempt to Resuscitate); Limited Support; No intubation (DNI)   Ordered at: 08/04/24 1645     Medical Intervention Limits:    No intubation (DNI)     Code Status (Patient has no pulse and is not breathing):    No CPR (Do Not Attempt to Resuscitate)     Medical Interventions (Patient has pulse or is breathing):    Limited Support      Advanced Directives: Advance Directive Status: Patient has advance directive, copy in chart   Healthcare surrogate: PRESLEY Leiva significant other  Goals of Care: I was able to speak with Cliff today at bedside to discuss GOC/ACP as Reny is not able herself at this time. Cliff confirmed that Reny is a DNR/DNI. We discussed Reny's condition, quality of life and that Cliff would like to get her back home with him if she was able. I told Cliff that we would be her e for support for Reny and him.    HPI:  Reny Elena is a 64 y.o. female admitted on 8/2/2024 due to nausea, vomiting, with right foot pain and fever for a week before presenting to the ER. Reny has a medical history of arthritis, insulin dependent type II DM, chronic diastolic CHF, hypothyroidism, seizure disorder, HTN, HLD, GERD, iron deficiency anemia, PAD, and ESRD on PD,   Reny was given an antibiotic at prior ER visit on 7/21/24 due to a UTI and she stated after a couple of days of antibiotic she began to experience nausea and vomiting, apparently she stopped taking the antibiotic and symptoms continued to persist. This visit in the ED Reny did complain of increased rt foot  pain. Reny was tachycardic. Since admission Reny has underwent a right AKA om 24 due to rt heel osteomyelitis. Pulmonary, Infectious disease, and Nephrology are all consulted on Ms Elena.      ROS: Negative except as above in HPI.     Past Medical History:   Diagnosis Date    Arthritis     Closed fracture of right fibula and tibia 2018    Depression     Diabetic ulcer of left foot associated with type 2 diabetes mellitus 2017    Diastolic dysfunction     Disease of thyroid gland     Elevated cholesterol     End stage renal disease     Essential hypertension     GERD (gastroesophageal reflux disease)     History of transfusion     Hyperlipidemia     Iron deficiency anemia 2018    PAD (peripheral artery disease)     Renal failure     Type 2 diabetes mellitus with hyperglycemia, with long-term current use of insulin 2018     Past Surgical History:   Procedure Laterality Date    ABDOMINAL SURGERY      ABOVE KNEE AMPUTATION Right 2024    Procedure: AMPUTATION ABOVE KNEE;  Surgeon: Angelo Santoro MD;  Location: [facility];  Service: General;  Laterality: Right;    BACK SURGERY      Post spinal diskectomy, osteophytectomy in one lumbar interspace    CATARACT EXTRACTION      Left      SECTION      DENTAL PROCEDURE      ENDOSCOPY      FRACTURE SURGERY      right leg    HYSTERECTOMY      INCISION AND DRAINAGE LEG Left 4/15/2017    Procedure: INCISION AND DRAINAGE LOWER EXTREMITY;  Surgeon: Basilio Morris MD;  Location: [facility] OR;  Service:     INCISION AND DRAINAGE LEG Left 2017    Procedure: Irrigation and Debridment abcess diabetic wound left foot with deep culture;  Surgeon: Basilio Morris MD;  Location: [facility] OR;  Service:     INSERTION PERITONEAL DIALYSIS CATHETER N/A 2021    Procedure: INSERTION PERITONEAL DIALYSIS CATHETER LAPAROSCOPIC;  Surgeon: Edy Glover MD;  Location: [facility] OR;  Service: General;  Laterality: N/A;    KNEE ARTHROSCOPY Right     ORIF  TIBIA FRACTURE Right 12/28/2018    Procedure: TIBIAL  FRACTURE OPEN REDUCTION INTERNAL FIXATION;  Surgeon: Jung Barragan MD;  Location: [facility];  Service: Orthopedics    TOE AMPUTATION Right     5th    TUBAL ABDOMINAL LIGATION       Social History     Socioeconomic History    Marital status:    Tobacco Use    Smoking status: Never     Passive exposure: Never    Smokeless tobacco: Never   Vaping Use    Vaping status: Never Used   Substance and Sexual Activity    Alcohol use: No    Drug use: No    Sexual activity: Defer     Partners: Male     Family History   Problem Relation Age of Onset    Heart disease Mother     Cancer Mother     COPD Mother     Diabetes Other     Depression Other        Allergies   Allergen Reactions    Morphine Nausea And Vomiting    Penicillins Hives and GI Intolerance             Codeine Hives, Rash and GI Intolerance     Pt tolerates Kanaranzi @ home    Sulfa Antibiotics Hives, Rash and GI Intolerance     NAUSEA TOO       Current Facility-Administered Medications   Medication Dose Route Frequency Provider Last Rate Last Admin    aspirin EC tablet 81 mg  81 mg Oral Daily Rebeka Woodruff MD   81 mg at 08/14/24 0800    [Held by provider] atorvastatin (LIPITOR) tablet 80 mg  80 mg Oral Nightly Angelo Santoro MD   80 mg at 08/10/24 2034    sennosides-docusate (PERICOLACE) 8.6-50 MG per tablet 2 tablet  2 tablet Oral BID PRN Rebeka Woodruff MD        And    polyethylene glycol (MIRALAX) packet 17 g  17 g Oral Daily PRN Rebeka Woodruff MD        And    bisacodyl (DULCOLAX) EC tablet 5 mg  5 mg Oral Daily PRN Rebeka Woodruff MD        And    bisacodyl (DULCOLAX) suppository 10 mg  10 mg Rectal Daily PRN Rebeka Woodruff MD        Calcium Replacement - Follow Nurse / BPA Driven Protocol   Does not apply PRN Rebeka Woodruff MD        dextrose (D50W) (25 g/50 mL) IV injection 25 g  25 g Intravenous Q15 Min PRN Rebeka Woodruff MD   25 g at 08/12/24 5440     dextrose (D50W) (25 g/50 mL) IV injection 25 g  25 g Intravenous Q15 Min PRN Rebeka Woodruff MD        dextrose (GLUTOSE) oral gel 15 g  15 g Oral Q15 Min PRN Rebeka Woodruff MD        ferrous sulfate tablet 325 mg  325 mg Oral Daily With Breakfast Rebeka Woodruff MD   325 mg at 08/14/24 0759    FLUoxetine (PROzac) capsule 10 mg  10 mg Oral Daily Rebeka Woodruff MD   10 mg at 08/14/24 0800    fluticasone (FLONASE) 50 MCG/ACT nasal spray 2 spray  2 spray Nasal Daily PRN Rebeka Woodruff MD        folic acid (FOLVITE) tablet 1 mg  1 mg Oral Daily Rebeka Woodruff MD   1 mg at 08/14/24 0800    glucagon HCl (Diagnostic) injection 1 mg  1 mg Intramuscular Q15 Min PRN Rebeka Woodruff MD        Hydrocortisone Sod Suc (PF) (Solu-CORTEF) injection 50 mg  50 mg Intravenous Q6H Johnson Rider MD   50 mg at 08/14/24 1359    Insulin Lispro (humaLOG) injection 2-7 Units  2-7 Units Subcutaneous 4x Daily AC & at Bedtime Rebeka Woodruff MD   2 Units at 08/14/24 0801    ipratropium (ATROVENT) nebulizer solution 0.5 mg  0.5 mg Nebulization Q6H PRN Rebeka Woodruff MD        lacosamide (VIMPAT) tablet 50 mg  50 mg Oral Q12H Rebeka Woodruff MD   50 mg at 08/14/24 0800    levothyroxine (SYNTHROID, LEVOTHROID) tablet 100 mcg  100 mcg Oral QAM AC Rebeka Woodruff MD   100 mcg at 08/14/24 0759    Lidocaine 4 % 1 patch  1 patch Transdermal Daily PRN Rebeka Woodruff MD        Magnesium Standard Dose Replacement - Follow Nurse / BPA Driven Protocol   Does not apply PRN Rebeka Woodruff MD        midodrine (PROAMATINE) tablet 7.5 mg  7.5 mg Oral TID AC Rebeka Woodruff MD   7.5 mg at 08/12/24 1735    mupirocin (BACTROBAN) 2 % ointment 1 Application  1 Application Topical Q12H Rebeka Woodruff MD   1 Application at 08/14/24 0957    nitroglycerin (NITROSTAT) SL tablet 0.4 mg  0.4 mg Sublingual Q5 Min PRN Rebeka Woodruff MD        norepinephrine (LEVOPHED) 8 mg in 250 mL NS  infusion (premix)  0.02-0.3 mcg/kg/min Intravenous Titrated Rebeka Woodruff MD   Stopped at 08/13/24 0322    oxyCODONE (ROXICODONE) immediate release tablet 5 mg  5 mg Oral Q4H PRN Jennifer Sanders APRN   5 mg at 08/14/24 1205    pantoprazole (PROTONIX) EC tablet 40 mg  40 mg Oral QAM AC Rebeka Woodruff MD   40 mg at 08/14/24 0759    Pharmacy Consult - Pharmacy to dose   Does not apply Continuous Rebeka Woodruff MD        Pharmacy Consult - Pharmacy to dose   Does not apply Continuous Rebeka Woodruff MD        phenylephrine (KAILA-SYNEPHRINE) 50 mg in 250 mL NS infusion  0.5-3 mcg/kg/min Intravenous Titrated Rebeka Woodruff MD   Stopped at 08/13/24 0519    Phosphorus Replacement - Follow Nurse / BPA Driven Protocol   Does not apply PRN Rebeka Woodruff MD        piperacillin-tazobactam (ZOSYN) IVPB 3.375 g IVPB in 100 mL NS (VTB)  3.375 g Intravenous Q12H Rebeka Woodruff MD   3.375 g at 08/14/24 0801    Potassium Replacement - Follow Nurse / BPA Driven Protocol   Does not apply PRN Rebeka Woodruff MD        sodium chloride 0.45 % 1,000 mL with sodium bicarbonate 8.4 % 75 mEq infusion   Intravenous Continuous Rebeka Woodruff MD 75 mL/hr at 08/14/24 0800 New Bag at 08/14/24 0800    sodium chloride 0.9 % bolus 1,000 mL  1,000 mL Intravenous Once Rebeka Woodruff MD 2,000 mL/hr at 08/12/24 0315 Currently Infusing at 08/12/24 0315    sodium chloride 0.9 % flush 10 mL  10 mL Intravenous PRN Rebeka Woodruff MD        sodium chloride 0.9 % flush 10 mL  10 mL Intravenous Q12H Rebeka Woodruff MD   10 mL at 08/14/24 0957    sodium chloride 0.9 % flush 10 mL  10 mL Intravenous PRN Rebeka Woodruff MD        sodium chloride 0.9 % flush 10 mL  10 mL Intravenous Q12H Rebeka Woodruff MD   10 mL at 08/14/24 0957    sodium chloride 0.9 % flush 10 mL  10 mL Intravenous Q12H Rebeka Woodruff MD   10 mL at 08/14/24 0957    sodium chloride 0.9 % flush 10 mL  10 mL  Intravenous Q12H Rebeka Woodruff MD   10 mL at 08/14/24 0957    sodium chloride 0.9 % flush 10 mL  10 mL Intravenous PRN Rebeka Woodruff MD        sodium chloride 0.9 % flush 10 mL  10 mL Intravenous Q12H Rebeka Woodruff MD   10 mL at 08/14/24 0957    sodium chloride 0.9 % flush 10 mL  10 mL Intravenous PRN Rebeka Woodruff MD        sodium chloride 0.9 % flush 20 mL  20 mL Intravenous PRN Rebeka Woodruff MD        sodium chloride 0.9 % infusion 40 mL  40 mL Intravenous PRN Rebeka Woodruff MD        sodium chloride 0.9 % infusion 40 mL  40 mL Intravenous PRN Rebeka Woodruff MD        sodium chloride 0.9 % infusion 40 mL  40 mL Intravenous PRN Rebeka Woodruff MD        traZODone (DESYREL) tablet 25 mg  25 mg Oral Nightly Rebeka Woodruff MD   25 mg at 08/12/24 2013    Vancomycin Pharmacy Intermittent/Pulse Dosing   Does not apply Daily Rebeka Woodruff MD         norepinephrine, 0.02-0.3 mcg/kg/min, Last Rate: Stopped (08/13/24 0322)  Pharmacy Consult - Pharmacy to dose,   Pharmacy Consult - Pharmacy to dose,   phenylephrine, 0.5-3 mcg/kg/min, Last Rate: Stopped (08/13/24 0519)  sodium chloride 0.45 % 1,000 mL with sodium bicarbonate 8.4 % 75 mEq infusion, , Last Rate: 75 mL/hr at 08/14/24 0800        senna-docusate sodium **AND** polyethylene glycol **AND** bisacodyl **AND** bisacodyl    Calcium Replacement - Follow Nurse / BPA Driven Protocol    dextrose    dextrose    dextrose    fluticasone    glucagon (human recombinant)    ipratropium    Lidocaine    Magnesium Standard Dose Replacement - Follow Nurse / BPA Driven Protocol    nitroglycerin    oxyCODONE    Phosphorus Replacement - Follow Nurse / BPA Driven Protocol    Potassium Replacement - Follow Nurse / BPA Driven Protocol    sodium chloride    sodium chloride    sodium chloride    sodium chloride    sodium chloride    sodium chloride    sodium chloride    sodium chloride    Current medication reviewed for route,  "type, dose and frequency and are current per MAR.    Palliative Performance Scale Score:     /96   Pulse 92   Temp 98 °F (36.7 °C) (Oral)   Resp 12   Ht 162.6 cm (64.02\")   Wt 46.5 kg (102 lb 8.2 oz)   LMP 05/26/2005   SpO2 98%   BMI 17.59 kg/m²     Intake/Output Summary (Last 24 hours) at 8/14/2024 1519  Last data filed at 8/14/2024 1358  Gross per 24 hour   Intake 3492.5 ml   Output 400 ml   Net 3092.5 ml       PE:  General Appearance:    Chronically ill appearing, alert, slow to respond.cooperative, NAD   HEENT:    NC/AT, without obvious abnormality, EOMI, anicteric    Neck:   supple, trachea midline, no JVD   Lungs:     Unlabored respirations, no wheezing rhonchi or rales noted.    Heart:    RRR, normal S1 and S2, no M/R/G   Abdomen:     Soft, NT, ND, NABS    Extremities:   RT Aka with kerlex and ace bandage intact, edema in left lower extremity   Pulses:   Pulses palpable and equal bilaterally   Skin:   Warm, dry   Neurologic:   Awake and alert to self only, slow to respond to questions, with sometimes appropriate answers.   Psych:   Calm, flat       Labs:   Results from last 7 days   Lab Units 08/14/24  0702   WBC 10*3/mm3 5.70   HEMOGLOBIN g/dL 9.2*   HEMATOCRIT % 26.8*   PLATELETS 10*3/mm3 104*     Results from last 7 days   Lab Units 08/14/24  0702   SODIUM mmol/L 134*   POTASSIUM mmol/L 3.7   CHLORIDE mmol/L 100   CO2 mmol/L 18.5*   BUN mg/dL 21   CREATININE mg/dL 3.03*   GLUCOSE mg/dL 136*   CALCIUM mg/dL 6.8*     Results from last 7 days   Lab Units 08/14/24  0702   SODIUM mmol/L 134*   POTASSIUM mmol/L 3.7   CHLORIDE mmol/L 100   CO2 mmol/L 18.5*   BUN mg/dL 21   CREATININE mg/dL 3.03*   CALCIUM mg/dL 6.8*   BILIRUBIN mg/dL 0.9   ALK PHOS U/L 433*   ALT (SGPT) U/L 73*   AST (SGOT) U/L 1,095*   GLUCOSE mg/dL 136*     Imaging Results (Last 72 Hours)       Procedure Component Value Units Date/Time    CT Head Without Contrast [[id number]] Collected: 08/14/24 0059     Updated: 08/14/24 0101    " Narrative:      Noncontrast CT brain     Indications: Altered mental status     Technique: Noncontrast CT images of the brain were obtained.  Limited  exposure control, adjustment of the MA and/or KV according to patient  size or use of an iterative reconstruction technique was utilized.     Findings CT brain:     Comparison is made to the prior study dated 4/28/2024.     Examination is somewhat limited by patient positioning.  Axial images  are not true brain axial images.  Allowing for this, there is no  evidence of acute intercranial hemorrhage.  The ventricles are  prominent, stable from the prior study.  Patchy and confluent areas of  hypodensity involve the periventricular deep white matter compatible  sequelae of chronic small vessel ischemic changes.  There is no  mass-effect or midline shift.  No abnormal extra-axial fluid collections  are demonstrated.       Impression:      Impression:     No CT evidence of an acute intracranial process.     This report was finalized on 8/14/2024 12:59 AM by Chin Chowdhury MD.       CT Chest Without Contrast Diagnostic [[id number]] Collected: 08/13/24 1955     Updated: 08/13/24 2010    Narrative:      Procedure: Contiguous axial images were obtained through the chest  abdomen and pelvis without intravenous contrast.  The use of sagittal  coronal reformations are supplied.     Comparison: CT abdomen 8/22/2024, chest 4/28/2024     Findings     CHEST: Chest is well-expanded with diffuse groundglass attenuation  throughout all lobes.     A small left pleural effusion is present with airspace consolidation.     Trachea and mainstem bronchi are patent.     Central venous catheter present with distal tip at the atrial caval  junction.  Heart size within normal limits.     No adenopathy in the chest.     Cachexia and anasarca noted.     In bone windows, sternum, thoracic vertebral bodies and ribs are intact.   No pneumothorax.     ABDOMEN/PELVIS: Moderate ascites present.      Diffuse anasarca noted.  A catheter enters the mid abdomen, to the left  of midline with distal tip coiled in the left mid abdomen.     There may be fluid surrounding the gallbladder which is not  well-defined.     No extraluminal gas.  Urinary bladder distends normally.  A large amount  of free fluid in the pelvis.  Uterus is not identified.  A small amount  of formed stool present in the colon.  A discrete appendix is not  identified.     The noncontrast enhanced liver, spleen, stomach and adrenals are  morphologically unremarkable.  Kidneys are atrophic with calcified  vascular structures.     No dilated loops of bowel or bowel obstruction.     Above-the-knee amputation noted on the  view.     Moderate degenerative change at L5-S1.       Impression:         1.  Small to moderate left pleural effusion with adjacent airspace  consolidation suggesting atelectasis or pneumonia.     2.  Diffuse groundglass attenuation of the lungs compatible with  alveolitis.     3.   Mildly distended gallbladder with indistinct appearance.  If acute  cholecystitis is of clinical concern, right upper quadrant ultrasound  could be considered.     4.  Anasarca              This report was finalized on 8/13/2024 8:08 PM by Regina Magaña MD.       CT Abdomen Pelvis Without Contrast [[id number]] Collected: 08/13/24 1955     Updated: 08/13/24 2010    Narrative:      Procedure: Contiguous axial images were obtained through the chest  abdomen and pelvis without intravenous contrast.  The use of sagittal  coronal reformations are supplied.     Comparison: CT abdomen 8/22/2024, chest 4/28/2024     Findings     CHEST: Chest is well-expanded with diffuse groundglass attenuation  throughout all lobes.     A small left pleural effusion is present with airspace consolidation.     Trachea and mainstem bronchi are patent.     Central venous catheter present with distal tip at the atrial caval  junction.  Heart size within normal limits.      No adenopathy in the chest.     Cachexia and anasarca noted.     In bone windows, sternum, thoracic vertebral bodies and ribs are intact.   No pneumothorax.     ABDOMEN/PELVIS: Moderate ascites present.     Diffuse anasarca noted.  A catheter enters the mid abdomen, to the left  of midline with distal tip coiled in the left mid abdomen.     There may be fluid surrounding the gallbladder which is not  well-defined.     No extraluminal gas.  Urinary bladder distends normally.  A large amount  of free fluid in the pelvis.  Uterus is not identified.  A small amount  of formed stool present in the colon.  A discrete appendix is not  identified.     The noncontrast enhanced liver, spleen, stomach and adrenals are  morphologically unremarkable.  Kidneys are atrophic with calcified  vascular structures.     No dilated loops of bowel or bowel obstruction.     Above-the-knee amputation noted on the  view.     Moderate degenerative change at L5-S1.       Impression:         1.  Small to moderate left pleural effusion with adjacent airspace  consolidation suggesting atelectasis or pneumonia.     2.  Diffuse groundglass attenuation of the lungs compatible with  alveolitis.     3.   Mildly distended gallbladder with indistinct appearance.  If acute  cholecystitis is of clinical concern, right upper quadrant ultrasound  could be considered.     4.  Anasarca              This report was finalized on 8/13/2024 8:08 PM by Regina Magaña MD.       XR Chest 1 View [[id number]] Collected: 08/12/24 1630     Updated: 08/12/24 1632    Narrative:      EXAM:    XR Chest, 1 View     EXAM DATE:    8/12/2024 4:26 PM     CLINICAL HISTORY:    CVC Placement Verification; E11.621-Type 2 diabetes mellitus with foot  ulcer; L97.416-Non-pressure chronic ulcer of right heel and midfoot with  bone involvement without evidence of necrosis; Z99.2-Dependence on renal  dialysis; R11.2-Nausea with vomiting, unspecified; N30.01-Acute cystitis  with  hematuria; L97.519-Non-pressure chronic ulcer of other part of  right foot with unspecified severity     TECHNIQUE:    Frontal view of the chest.     COMPARISON:    8/11/2024     FINDINGS:    LUNGS AND PLEURAL SPACES:  Coarsened interstitial markings noted  throughout the lungs.  No consolidation.  No pneumothorax.    HEART:  Unremarkable as visualized.  No cardiomegaly.    MEDIASTINUM:  Unremarkable as visualized.  Normal mediastinal contour.    BONES/JOINTS:  Unremarkable as visualized.  No acute fracture.    TUBES, LINES AND DEVICES:  Left subclavian central venous catheter tip  in the superior vena cava.       Impression:        No acute cardiopulmonary findings identified.        This report was finalized on 8/12/2024 4:30 PM by Dr. Harrison Biswas MD.        Liver [[id number]] Collected: 08/11/24 1655     Updated: 08/11/24 1659    Narrative:      PROCEDURE: Abdominal ultrasound evaluation performed on August 11, 2024.  Color flow imaging performed.     HISTORY: Elevated liver function tests.     COMPARISON: None.     FINDINGS:     Echogenic liver suggestive of fatty infiltration.  Small volume of free fluid adjacent to the liver consistent with  ascites.  Patent hepatic veins with appropriate direction of flow.  Patent main portal vein with appropriate direction of flow.  No hepatic mass  No dilated intrahepatic bile ducts  Sludge noted in the gallbladder lumen.  No gallbladder wall thickening or para cholecystic fluid.  No right renal hydronephrosis  No features of cholecystitis.       Impression:         1.  Echogenic liver suggestive of fatty infiltration.  2.  Patent main portal vein with appropriate direction of flow.  3.  Patent hepatic veins with appropriate direction of flow.  4.  Sludge noted in the gallbladder lumen.  5.  No features of cholecystitis.  6.  Small volume of free fluid in the right upper quadrant consistent  with ascites.  7.  No hepatic mass or cyst  8.  No dilated intrahepatic bile  ducts.     This report was finalized on 8/11/2024 4:57 PM by Carlos Andrews MD.               Diagnostics: Reviewed    A: Reny Elena is a 64 y.o. female admitted on 8/2/2024 due to nausea, vomiting, with right foot pain and fever for a week before presenting to the ER. Reny has a medical history of arthritis, insulin dependent type II DM, chronic diastolic CHF, hypothyroidism, seizure disorder, HTN, HLD, GERD, iron deficiency anemia, PAD, and ESRD on PD,   Reny was given an antibiotic at prior ER visit on 7/21/24 due to a UTI and she stated after a couple of days of antibiotic she began to experience nausea and vomiting, apparently she stopped taking the antibiotic and symptoms continued to persist. This visit in the ED Reny did complain of increased rt foot pain. Reny was tachycardic. 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 Pulmonary, Infectious disease, and Nephrology are all consulted on Ms Elena.           P:    I was able to speak with Cliff today at bedside to discuss GOC/ACP as Reny is not able herself at this time. Cliff confirmed that Reny is a DNR/DNI. We discussed Reny's condition, quality of life and that Cliff would like to get her back home with him if she was able. I told Cliff that we would be her e for support for Reny and him. I discussed Reny/treatment plan with Dr Woodruff and GLORIA Dunham.    We appreciate the consult and the opportunity to participate in Reny Elena's care. We will continue to follow along. Please do not hesitate to contact us regarding further symptom management or goals of care needs, including after hours or on weekends via our on call provider at [phone].     Time: 55 minutes spent reviewing medical and medication records, assessing and examining patient, discussing with family, answering questions, providing some guidance about a plan and documentation of care, and coordinating care with other healthcare  members, with > 50% time spent face to face.     DARIUS Berrios    8/14/2024      Electronically signed by Jennifer Sanders APRN at 08/14/24 6348       Discharge Summary    No notes of this type exist for this encounter.       Discharge Order (From admission, onward)       Start     Ordered    08/21/24 1002  Discharge patient  Once        Expected Discharge Date: 08/21/24   Expected Discharge Time: Morning   Discharge Disposition: Home or Self Care   Physician of Record for Attribution - Please select from Treatment Team: RYAN FAUSTIN [[id number]]   Review needed by CMO to determine Physician of Record: No      Question Answer Comment   Physician of Record for Attribution - Please select from Treatment Team RYAN FAUSTIN    Review needed by CMO to determine Physician of Record No        08/21/24 1008

## 2024-08-21 NOTE — DISCHARGE INSTR - APPOINTMENTS
You have a follow-up appointment with CLAUDIO JETT NP scheduled for 9-3-24 at 10:15, Office can be reached at [phone]

## 2024-08-21 NOTE — DISCHARGE SUMMARY
[facility] HOSPITALIST MEDICINE DISCHARGE SUMMARY    Patient Identification:  Name:  Reny Elena  Age:  64 y.o.  Sex:  female  :  1960  MRN:  [id number]  Visit Number:  [id number]    Date of Admission: 2024  Date of Discharge: 2024    PCP: Susan Stephens APRN    DISCHARGE DIAGNOSIS   Septic Shock (POA, now resolved)  Acute Ischemic Hepatitis  ESRD on PD  Severe Protein/Malnutrition  Right Ankle Osteomyelitis s/p Right AKA  Type 2 DM  Acute Blood Loss Anemia  Thrombocytopenia  New Hospice Patient      CONSULTS  DARIUS Bowers, Palliative Care  Dr. Rider, Pulmonlogy  Dr. Glover, General Surgery  Dr. Sandy, Nephrology  Dr. Rubio, ID      PROCEDURES PERFORMED   Patient did undergo right above-knee amputation secondary to nonhealing right heel osteomyelitis on 2024.  Please see procedure note for specific details.  Patient tolerated the procedure well with no postprocedural complications.      HOSPITAL COURSE  Ms. Elena is a 64 y.o. female who presented to [facility] ED on 8/3/2024 with a chief complaint of nausea and vomiting.  Patient has a past medical history remarkable for insulin-dependent type 2 diabetes mellitus, chronic HFpEF, hypothyroidism, seizure disorder, essential hypertension, hyperlipidemia, GERD, end-stage renal disease on peritoneal dialysis and peripheral arterial disease.  Patient reported 1 week history of nausea with vomiting.  She did come to our emergency department on 2024 complaining of hallucinations as well as right foot pain and fever.  Patient was sent home with a prescription for some antibiotic (currently unknown which antibiotic) and was referred to outpatient wound care.  Patient did follow-up with outpatient wound care on 8/3/2024 and right calcaneal wound did appear worsened.  Patient did state she developed nausea with vomiting after taking oral antibiotics for several days and then stopped taking the antibiotics.   Secondary to worsening wound, patient was referred to the emergency department for further treatment evaluation.  Initial evaluation in the emergency department did consist of basic laboratory work as well as physical exam and vital signs.  Please see initial H&P for specific details.  Patient did have x-ray of right foot obtained which demonstrated diffuse soft tissue swelling.  CT abdomen/pelvis was obtained which demonstrated possible mild distal colitis.  Overall, patient was diagnosed with intractable nausea and vomiting possibly secondary to acute cystitis and was admitted for further treatment and evaluation.    In regards to right heel ulcer, patient was started on IV vancomycin based on wound culture from 7/21/2024 that demonstrated Enterococcus faecalis.  Patient was also started on IV Rocephin for urinary tract infection.  Patient was also started on IV Flagyl secondary to possible underlying colitis.  General surgery services were consulted who did thoroughly evaluate the patient.  After thorough evaluation, patient was diagnosed with clinical osteomyelitis as she did have exposed bone on her right heel.  As patient is nonambulatory and had hardware throughout her right lower extremity, recommendation was made to proceed with right above-knee amputation.  Patient did undergo right above-knee amputation on 8/9/2024 and tolerated the procedure well with no postprocedural complications.  Unfortunately, on 8/12/2024 patient did develop hypotension requiring transfer to the intensive care unit and initiation of vasopressor therapy.  Patient also had transient elevated liver enzymes suspicious for ischemic hepatitis.  Patient was maintained on vasopressor therapy until 8/13/2024 when blood pressure is did improve.  Patient was found to be significantly anasarcic and malnourished.  Patient was encouraged to increase oral intake but unfortunately patient was unable to keep up with her daily caloric needs.   Consideration was had regarding placing Dobbhoff tube but this was delayed/deferred secondary to development of coagulopathy.  Patient did complete a full course of antibiotic therapy secondary to osteomyelitis of right heel status post above-knee amputation.  Focus was then placed on increasing patient's nutrition which patient did have slow improvement in oral intake but never truly reached a steady state of adequate caloric intake.  As patient appeared to be slowly declining, discussion was held with patient and her significant other at bedside regarding overall goals of care.  Palliative care was consulted who had multiple conversations with both the patient and her significant other.  Patient did have a prolonged hospitalization and towards the end of her hospital stay, patient stated she no longer wished to be hospitalized and preferred to transition her goals of care to comfort measures.  As such, patient will be discharged from the hospital today with intentions of enrolling in hospice upon arrival at home.  With this in mind, it is felt patient has reached maximum medical benefit of current hospitalization and she will be discharged home with hospice in stable condition today.  The beforementioned plan was thoroughly discussed with the patient and she expressed understanding and willingness to proceed with the beforementioned plan.    VITAL SIGNS:      08/19/24  0500 08/20/24  0600 08/21/24  0500   Weight: 45.1 kg (99 lb 6.4 oz) (Nik Piña) 42.7 kg (94 lb 3.2 oz) 45.5 kg (100 lb 6.4 oz)     Body mass index is 17.23 kg/m².    PHYSICAL EXAM:  Constitutional: Chronically ill-appearing  female in no apparent distress.     HENT:  Head:  Normocephalic and atraumatic.  Mouth:  Moist mucous membranes.    Eyes:  Conjunctivae and EOM are normal.  Pupils are equal, round, and reactive to light.  No scleral icterus.    Neck:  Neck supple. No thyromegaly.  No JVD present.    Cardiovascular:  Regular rate  and rhythm with no murmurs, rubs, clicks or gallops appreciated.  Pulmonary/Chest:  Clear to auscultation bilaterally with no crackles, wheezes or rhonchi appreciated.  Abdominal:  Soft. Nontender. Nondistended, peritoneal dialysis catheter in place.  Bowel sounds are normal in all four quadrants. No organomegally appreciated.   Musculoskeletal: Patient with diffuse anasarca, no tenderness, patient with right above-knee amputation stump which is clean and dry.  Neurological:  Alert, Cranial nerves II-XII intact with no focal deficits.  No facial droop.  No slurred speech.   Skin:  Warm and dry to palpation with no rashes or lesions appreciated.  Peripheral vascular:  2+ radial and pedal pulses in bilateral upper and lower extremities.  Psychiatric:  Alert and oriented x3, demonstrates appropriate judgment and insight.  DISCHARGE DISPOSITION   Stable    DISCHARGE MEDICATIONS:     Discharge Medications        New Medications        Instructions Start Date   LORazepam 0.5 MG tablet  Commonly known as: Ativan   0.25 mg, Oral, 2 Times Daily PRN      metoprolol tartrate 25 MG tablet  Commonly known as: LOPRESSOR   12.5 mg, Oral, Every 12 Hours Scheduled      oxyCODONE-acetaminophen 7.5-325 MG per tablet  Commonly known as: Percocet   1 tablet, Oral, Every 6 Hours PRN             Changes to Medications        Instructions Start Date   FLUoxetine 10 MG capsule  Commonly known as: PROzac  What changed:   medication strength  how much to take   10 mg, Oral, Daily   Start Date: August 22, 2024            Continue These Medications        Instructions Start Date   atorvastatin 80 MG tablet  Commonly known as: LIPITOR   80 mg, Oral, Nightly      ferrous sulfate 325 (65 FE) MG tablet   325 mg, Oral, Daily With Breakfast      Fiasp 100 UNIT/ML solution  Generic drug: Insulin Aspart (w/Niacinamide)   2-7 Units, Injection, 4 Times Daily Before Meals & Nightly      fluticasone 50 MCG/ACT nasal spray  Commonly known as: FLONASE   2  sprays, Nasal, Daily PRN      folic acid 1 MG tablet  Commonly known as: FOLVITE   1 mg, Oral, Daily      lacosamide 50 MG tablet tablet  Commonly known as: VIMPAT   50 mg, Oral, Every 12 Hours Scheduled      levothyroxine 100 MCG tablet  Commonly known as: SYNTHROID, LEVOTHROID   100 mcg, Oral, Daily      lidocaine 5 %  Commonly known as: LIDODERM   1 patch, Transdermal, Daily PRN, Remove & Discard patch within 12 hours or as directed by MD      ondansetron ODT 4 MG disintegrating tablet  Commonly known as: ZOFRAN-ODT   4 mg, Translingual, 3 Times Daily PRN      pantoprazole 40 MG EC tablet  Commonly known as: PROTONIX   40 mg, Oral, Daily      potassium chloride 20 MEQ CR tablet  Commonly known as: KLOR-CON M20   20 mEq, Oral, 2 Times Daily      rOPINIRole 0.5 MG tablet  Commonly known as: REQUIP   0.5 mg, Oral, 2 Times Daily      tiZANidine 4 MG tablet  Commonly known as: ZANAFLEX   4 mg, Oral, Every 6 Hours PRN      traZODone 50 MG tablet  Commonly known as: DESYREL   50 mg, Oral, Nightly             Stop These Medications      aspirin 81 MG EC tablet     cefdinir 300 MG capsule  Commonly known as: OMNICEF     HYDROcodone-acetaminophen  MG per tablet  Commonly known as: NORCO     hydrOXYzine 25 MG tablet  Commonly known as: ATARAX     losartan 50 MG tablet  Commonly known as: COZAAR     NIFEdipine XL 90 MG 24 hr tablet  Commonly known as: PROCARDIA XL              Diet Instructions       Diet: Diabetic Diets; Consistent Carbohydrate; Regular (IDDSI 7); Thin (IDDSI 0)      Discharge Diet: Diabetic Diets    Diabetic Diet: Consistent Carbohydrate    Texture: Regular (IDDSI 7)    Fluid Consistency: Thin (IDDSI 0)      Regular, Pureed         Your Scheduled Appointments      Aug 22, 2024 3:00 PM  Post-Op with Angelo Santoro MD  [facility] GENERAL SURGERY ([facility]) [address] [place] [id number] [phone]      Additional instructions:    You have a follow-up  appointment with CLAUDIO JETT NP scheduled for 9-3-24 at 10:15, Office can be reached at [phone]         Activity Instructions    Activity as Tolerated      Wound Locations Right AKA stump surgical wound   Wound Care Instructions Paint incision line with betadine.  Cover with non-adherent pads.  Secure with roll gauze and ace wrap.             Follow-up Information       Susan Jett APRN .    Specialty: Nurse Practitioner  Contact information:  [address] [place] [address] [phone]                             TEST  RESULTS PENDING AT DISCHARGE  Pending Labs       Order Current Status    Fungus Culture - Body Fluid, Peritoneal Catheter Preliminary result             Narayan Mayorga DO  08/21/24  14:21 EDT    Please note that this discharge summary required more than 30 minutes to complete.    Please send a copy of this dictation to the following providers:  Susan Jett APRN

## 2024-08-21 NOTE — CASE MANAGEMENT/SOCIAL WORK
Discharge Planning Assessment  [facility]     Patient Name: Reny Elena  MRN: [id number]  Today's Date: 8/21/2024    Admit Date: 8/2/2024       Discharge Plan       Row Name 08/21/24 1201       Plan    Patient/Family in Agreement with Plan yes    Provided Post Acute Provider List? N/A  Has Existing Professional HH, prefers [facility]    Final Discharge Disposition Code --    Final Note Pt is being discharged home pending Palliative care team to re-evaluate. Pt and Pt's significant other are aware and agreeable to discharge. Pt prefers her existing Professional HH. SS notified Provider that [facility] will need new HH referral. SS faxed [facility] clinical update fax [phone]. Pt will need EMS to transport. Pt's significant other to call [facility] once he has picked up Pt's home medications at [facility]. SS notified Pt and signifcant other they will need to follow up with [employer] waiver services to check on status of additional caregivers.                  Anu Hurley, ADRIANW

## 2024-08-21 NOTE — DISCHARGE INSTR - ACTIVITY
Activity as Tolerated      Wound Locations Right AKA stump surgical wound   Wound Care Instructions Paint incision line with betadine.  Cover with non-adherent pads.  Secure with roll gauze and ace wrap.

## 2024-09-11 LAB
FUNGUS SPEC CULT: NORMAL
FUNGUS SPEC FUNGUS STN: NORMAL

## (undated) DEVICE — BNDG ELAS CO-FLEX SLF ADHR 4IN5YD LF STRL

## (undated) DEVICE — 4-PORT MANIFOLD: Brand: NEPTUNE 2

## (undated) DEVICE — SUT VIC 3/0 SH 27IN J416H

## (undated) DEVICE — PK EXTREM LOWR 70

## (undated) DEVICE — SUT PROLN 2/0 8685H

## (undated) DEVICE — OCCLUSIVE GAUZE PATCH,3% BISMUTH TRIBROMOPHENATE IN PETROLATUM BLEND: Brand: XEROFORM

## (undated) DEVICE — DRSNG PAD ABD 8X10IN STRL

## (undated) DEVICE — SUT SILK 2/0 SH 75CM 30IN BLK C016D

## (undated) DEVICE — BIT DRL QC DIA 2.8X165MM NS

## (undated) DEVICE — GLV SURG TRIUMPH ORTHO W/ALOE PF LTX 8 STRL

## (undated) DEVICE — HOLDER: Brand: DEROYAL

## (undated) DEVICE — ELECTRD BLD EZ CLN MOD XLNG 2.75IN

## (undated) DEVICE — DRSNG WND GZ CURAD OIL EMULSION 3X16IN LF STRL

## (undated) DEVICE — DRP C/ARM W/BAND W/CLIPS 41X74IN

## (undated) DEVICE — SPLNT ORTHOGLASS 4INX15FT

## (undated) DEVICE — SUT VIC 2/0 UR6 27IN J602H

## (undated) DEVICE — SYR LL TP 10ML STRL

## (undated) DEVICE — UNDERGLV SURG BIOGEL INDICAT PF 8 GRN

## (undated) DEVICE — UNDERCAST PADDING: Brand: DEROYAL

## (undated) DEVICE — ANTIBACTERIAL UNDYED BRAIDED (POLYGLACTIN 910), SYNTHETIC ABSORBABLE SUTURE: Brand: COATED VICRYL

## (undated) DEVICE — SPNG GZ WOVN 4X4IN 12PLY 10/BX STRL

## (undated) DEVICE — SUT MNCRYL PLS ANTIB UD 2/0 CP1 27IN

## (undated) DEVICE — SUT VIC 3/0 SH CR8 27IN VCP784D

## (undated) DEVICE — PAD GRND REM POLYHESIVE A/ DISP

## (undated) DEVICE — DRSNG GZ PETROLTM XEROFORM CURAD 4X4IN STRL

## (undated) DEVICE — 2108 SERIES SAGITTAL BLADE, OFFSET (18.5 X 0.64 X 63.0MM)

## (undated) DEVICE — GLV SURG TRIUMPH ORTHO W/ALOE PF LTX 8.5 STRL

## (undated) DEVICE — GAUZE,PACKING STRIP,PLAIN,1/4"X5YD,STRL: Brand: CURAD

## (undated) DEVICE — GLV SURG PREMIERPRO MIC LTX PF SZ7.5 BRN

## (undated) DEVICE — HANDPIECE SET WITH COAXIAL HIGH FLOW TIP AND SUCTION TUBE: Brand: INTERPULSE

## (undated) DEVICE — SUT VICRYL PLS CTD ANTIB BRAID NO1 8TO18IN VCP765D

## (undated) DEVICE — PROXIMATE RH ROTATING HEAD SKIN STAPLERS (35 WIDE) CONTAINS 35 STAINLESS STEEL STAPLES: Brand: PROXIMATE

## (undated) DEVICE — PAD,ABDOMINAL,8"X10",ST,LF: Brand: MEDLINE

## (undated) DEVICE — VIOLET BRAIDED (POLYGLACTIN 910), SYNTHETIC ABSORBABLE SUTURE: Brand: COATED VICRYL

## (undated) DEVICE — DISPOSABLE TOURNIQUET CUFF SINGLE BLADDER, SINGLE PORT AND LUER LOCK CONNECTOR: Brand: COLOR CUFF

## (undated) DEVICE — BNDG,ELSTC,MATRIX,STRL,6"X5YD,LF,HOOK&LP: Brand: MEDLINE

## (undated) DEVICE — SYR LUERLOK 30CC

## (undated) DEVICE — NDL HYPO ECLPS SFTY 22G 1 1/2IN

## (undated) DEVICE — PK LAP GEN 70

## (undated) DEVICE — INTENDED FOR TISSUE SEPARATION, AND OTHER PROCEDURES THAT REQUIRE A SHARP SURGICAL BLADE TO PUNCTURE OR CUT.: Brand: BARD-PARKER ® CARBON RIB-BACK BLADES

## (undated) DEVICE — DRSNG WND GZ CURAD OIL EMULSION 3X8IN LF STRL 1PK

## (undated) DEVICE — Device

## (undated) DEVICE — 3M™ IOBAN™ 2 ANTIMICROBIAL INCISE DRAPE 6650EZ: Brand: IOBAN™ 2

## (undated) DEVICE — BASIN SET PACK: Brand: MEDLINE INDUSTRIES, INC.

## (undated) DEVICE — SUT VIC 2/0 TIES 18IN J111T

## (undated) DEVICE — ADHS LIQ MASTISOL 2/3ML

## (undated) DEVICE — APPL CHLORAPREP HI/LITE 26ML ORNG

## (undated) DEVICE — ENDOPATH XCEL UNIVERSAL TROCAR STABLILITY SLEEVES: Brand: ENDOPATH XCEL

## (undated) DEVICE — APPL CHLORAPREP W/TINT 26ML ORNG

## (undated) DEVICE — SUT NLY 2/0 664G

## (undated) DEVICE — PENCL SMOKE/EVAC MEGADYNE TELESCP 10FT

## (undated) DEVICE — COVER,MAYO STAND,STERILE: Brand: MEDLINE

## (undated) DEVICE — ENDOPATH XCEL BLADELESS TROCARS WITH STABILITY SLEEVES: Brand: ENDOPATH XCEL

## (undated) DEVICE — DRP UTIL 2/LAYR W/TP 15X26IN STRL PK/4

## (undated) DEVICE — SPNG LAP PREWSH SFTPK 18X18IN STRL PK/5